# Patient Record
Sex: MALE | Race: WHITE | NOT HISPANIC OR LATINO | Employment: OTHER | URBAN - METROPOLITAN AREA
[De-identification: names, ages, dates, MRNs, and addresses within clinical notes are randomized per-mention and may not be internally consistent; named-entity substitution may affect disease eponyms.]

---

## 2017-01-04 ENCOUNTER — COMMUNICATION - HEALTHEAST (OUTPATIENT)
Dept: ONCOLOGY | Facility: HOSPITAL | Age: 52
End: 2017-01-04

## 2017-01-05 ENCOUNTER — COMMUNICATION - HEALTHEAST (OUTPATIENT)
Dept: ONCOLOGY | Facility: HOSPITAL | Age: 52
End: 2017-01-05

## 2017-02-06 ENCOUNTER — COMMUNICATION - HEALTHEAST (OUTPATIENT)
Dept: ONCOLOGY | Facility: HOSPITAL | Age: 52
End: 2017-02-06

## 2017-02-09 ENCOUNTER — AMBULATORY - HEALTHEAST (OUTPATIENT)
Dept: ONCOLOGY | Facility: HOSPITAL | Age: 52
End: 2017-02-09

## 2017-02-09 ENCOUNTER — COMMUNICATION - HEALTHEAST (OUTPATIENT)
Dept: ONCOLOGY | Facility: HOSPITAL | Age: 52
End: 2017-02-09

## 2017-02-09 ENCOUNTER — COMMUNICATION - HEALTHEAST (OUTPATIENT)
Dept: ADMINISTRATIVE | Facility: HOSPITAL | Age: 52
End: 2017-02-09

## 2017-02-13 ENCOUNTER — HOSPITAL ENCOUNTER (OUTPATIENT)
Dept: PET IMAGING | Facility: HOSPITAL | Age: 52
Setting detail: RADIATION/ONCOLOGY SERIES
Discharge: STILL ADMITTED OR ADMINISTRATIVE | End: 2017-02-13
Attending: INTERNAL MEDICINE

## 2017-02-13 ENCOUNTER — HOSPITAL ENCOUNTER (OUTPATIENT)
Dept: PET IMAGING | Facility: HOSPITAL | Age: 52
Discharge: HOME | End: 2017-02-13
Attending: INTERNAL MEDICINE

## 2017-02-13 DIAGNOSIS — C82.90 FOLLICULAR LYMPHOMA (H): ICD-10-CM

## 2017-02-13 ASSESSMENT — MIFFLIN-ST. JEOR: SCORE: 1824.63

## 2017-02-15 ENCOUNTER — OFFICE VISIT - HEALTHEAST (OUTPATIENT)
Dept: ONCOLOGY | Facility: HOSPITAL | Age: 52
End: 2017-02-15

## 2017-02-15 ENCOUNTER — AMBULATORY - HEALTHEAST (OUTPATIENT)
Dept: INFUSION THERAPY | Facility: HOSPITAL | Age: 52
End: 2017-02-15

## 2017-02-15 DIAGNOSIS — C82.90 FOLLICULAR LYMPHOMA (H): ICD-10-CM

## 2017-02-15 DIAGNOSIS — C82.08 FOLLICULAR LYMPHOMA GRADE I, LYMPH NODES OF MULTIPLE SITES (H): ICD-10-CM

## 2017-02-15 DIAGNOSIS — R00.2 HEART PALPITATIONS: ICD-10-CM

## 2017-02-16 ENCOUNTER — HOSPITAL ENCOUNTER (OUTPATIENT)
Dept: ULTRASOUND IMAGING | Facility: CLINIC | Age: 52
Setting detail: RADIATION/ONCOLOGY SERIES
Discharge: STILL ADMITTED OR ADMINISTRATIVE | End: 2017-02-16
Attending: INTERNAL MEDICINE

## 2017-02-16 DIAGNOSIS — C82.90 FOLLICULAR LYMPHOMA (H): ICD-10-CM

## 2017-02-17 ENCOUNTER — COMMUNICATION - HEALTHEAST (OUTPATIENT)
Dept: ONCOLOGY | Facility: HOSPITAL | Age: 52
End: 2017-02-17

## 2017-02-17 ENCOUNTER — HOSPITAL ENCOUNTER (OUTPATIENT)
Dept: INTERVENTIONAL RADIOLOGY/VASCULAR | Facility: HOSPITAL | Age: 52
Discharge: HOME | End: 2017-02-17
Attending: INTERNAL MEDICINE

## 2017-02-17 DIAGNOSIS — C82.08 FOLLICULAR LYMPHOMA GRADE I, LYMPH NODES OF MULTIPLE SITES (H): ICD-10-CM

## 2017-02-17 DIAGNOSIS — C82.90 FOLLICULAR LYMPHOMA (H): ICD-10-CM

## 2017-02-17 LAB
LAB AP CHARGES (HE HISTORICAL CONVERSION): ABNORMAL
PATH REPORT.COMMENTS IMP SPEC: ABNORMAL
PATH REPORT.COMMENTS IMP SPEC: ABNORMAL
PATH REPORT.FINAL DX SPEC: ABNORMAL
PATH REPORT.MICROSCOPIC SPEC OTHER STN: ABNORMAL
RESULT FLAG (HE HISTORICAL CONVERSION): ABNORMAL

## 2017-02-17 ASSESSMENT — MIFFLIN-ST. JEOR: SCORE: 1910.81

## 2017-02-18 LAB
LAB AP CHARGES (HE HISTORICAL CONVERSION): ABNORMAL
LAB AP INITIAL CYTO EVAL (HE HISTORICAL CONVERSION): ABNORMAL
LAB MED GENERAL PATH INTERP (HE HISTORICAL CONVERSION): ABNORMAL
PATH REPORT.COMMENTS IMP SPEC: ABNORMAL
PATH REPORT.COMMENTS IMP SPEC: ABNORMAL
PATH REPORT.FINAL DX SPEC: ABNORMAL
PATH REPORT.MICROSCOPIC SPEC OTHER STN: ABNORMAL
PATH REPORT.RELEVANT HX SPEC: ABNORMAL
SPECIMEN DESCRIPTION: ABNORMAL

## 2017-02-20 ENCOUNTER — COMMUNICATION - HEALTHEAST (OUTPATIENT)
Dept: INTERVENTIONAL RADIOLOGY/VASCULAR | Facility: HOSPITAL | Age: 52
End: 2017-02-20

## 2017-02-20 ENCOUNTER — INFUSION - HEALTHEAST (OUTPATIENT)
Dept: INFUSION THERAPY | Facility: HOSPITAL | Age: 52
End: 2017-02-20

## 2017-02-20 DIAGNOSIS — C82.90 FOLLICULAR LYMPHOMA (H): ICD-10-CM

## 2017-02-20 DIAGNOSIS — C82.08 FOLLICULAR LYMPHOMA GRADE I, LYMPH NODES OF MULTIPLE SITES (H): ICD-10-CM

## 2017-02-20 LAB
HBV CORE AB SERPL QL IA: NEGATIVE
HBV SURFACE AG SERPL QL IA: NEGATIVE

## 2017-02-21 ENCOUNTER — INFUSION - HEALTHEAST (OUTPATIENT)
Dept: INFUSION THERAPY | Facility: HOSPITAL | Age: 52
End: 2017-02-21

## 2017-02-21 DIAGNOSIS — C82.90 FOLLICULAR LYMPHOMA (H): ICD-10-CM

## 2017-02-21 DIAGNOSIS — C82.08 FOLLICULAR LYMPHOMA GRADE I, LYMPH NODES OF MULTIPLE SITES (H): ICD-10-CM

## 2017-03-06 ENCOUNTER — AMBULATORY - HEALTHEAST (OUTPATIENT)
Dept: INFUSION THERAPY | Facility: HOSPITAL | Age: 52
End: 2017-03-06

## 2017-03-06 ENCOUNTER — OFFICE VISIT - HEALTHEAST (OUTPATIENT)
Dept: ONCOLOGY | Facility: HOSPITAL | Age: 52
End: 2017-03-06

## 2017-03-06 DIAGNOSIS — C82.08 FOLLICULAR LYMPHOMA GRADE I, LYMPH NODES OF MULTIPLE SITES (H): ICD-10-CM

## 2017-03-06 DIAGNOSIS — R10.13 DYSPEPSIA: ICD-10-CM

## 2017-03-06 DIAGNOSIS — D63.8 ANEMIA, CHRONIC DISEASE: ICD-10-CM

## 2017-03-06 DIAGNOSIS — R11.0 CHEMOTHERAPY-INDUCED NAUSEA: ICD-10-CM

## 2017-03-06 DIAGNOSIS — T45.1X5A CHEMOTHERAPY-INDUCED NAUSEA: ICD-10-CM

## 2017-03-06 ASSESSMENT — MIFFLIN-ST. JEOR: SCORE: 1911.72

## 2017-03-20 ENCOUNTER — OFFICE VISIT - HEALTHEAST (OUTPATIENT)
Dept: ONCOLOGY | Facility: HOSPITAL | Age: 52
End: 2017-03-20

## 2017-03-20 ENCOUNTER — AMBULATORY - HEALTHEAST (OUTPATIENT)
Dept: INFUSION THERAPY | Facility: HOSPITAL | Age: 52
End: 2017-03-20

## 2017-03-20 ENCOUNTER — INFUSION - HEALTHEAST (OUTPATIENT)
Dept: INFUSION THERAPY | Facility: HOSPITAL | Age: 52
End: 2017-03-20

## 2017-03-20 DIAGNOSIS — C82.08 FOLLICULAR LYMPHOMA GRADE I, LYMPH NODES OF MULTIPLE SITES (H): ICD-10-CM

## 2017-03-20 DIAGNOSIS — D64.81 ANEMIA ASSOCIATED WITH CHEMOTHERAPY: ICD-10-CM

## 2017-03-20 DIAGNOSIS — R53.83 FATIGUE: ICD-10-CM

## 2017-03-20 DIAGNOSIS — T45.1X5A ANEMIA ASSOCIATED WITH CHEMOTHERAPY: ICD-10-CM

## 2017-03-21 ENCOUNTER — INFUSION - HEALTHEAST (OUTPATIENT)
Dept: INFUSION THERAPY | Facility: HOSPITAL | Age: 52
End: 2017-03-21

## 2017-03-21 DIAGNOSIS — C82.08 FOLLICULAR LYMPHOMA GRADE I, LYMPH NODES OF MULTIPLE SITES (H): ICD-10-CM

## 2017-04-18 ENCOUNTER — AMBULATORY - HEALTHEAST (OUTPATIENT)
Dept: INFUSION THERAPY | Facility: HOSPITAL | Age: 52
End: 2017-04-18

## 2017-04-18 ENCOUNTER — OFFICE VISIT - HEALTHEAST (OUTPATIENT)
Dept: ONCOLOGY | Facility: HOSPITAL | Age: 52
End: 2017-04-18

## 2017-04-18 ENCOUNTER — INFUSION - HEALTHEAST (OUTPATIENT)
Dept: INFUSION THERAPY | Facility: HOSPITAL | Age: 52
End: 2017-04-18

## 2017-04-18 DIAGNOSIS — F41.9 ANXIETY: ICD-10-CM

## 2017-04-18 DIAGNOSIS — R79.89 ELEVATED TSH: ICD-10-CM

## 2017-04-18 DIAGNOSIS — C82.08 FOLLICULAR LYMPHOMA GRADE I, LYMPH NODES OF MULTIPLE SITES (H): ICD-10-CM

## 2017-04-18 DIAGNOSIS — K12.31 MUCOSITIS DUE TO CHEMOTHERAPY: ICD-10-CM

## 2017-04-19 ENCOUNTER — INFUSION - HEALTHEAST (OUTPATIENT)
Dept: INFUSION THERAPY | Facility: HOSPITAL | Age: 52
End: 2017-04-19

## 2017-04-19 DIAGNOSIS — C82.08 FOLLICULAR LYMPHOMA GRADE I, LYMPH NODES OF MULTIPLE SITES (H): ICD-10-CM

## 2017-04-21 ENCOUNTER — COMMUNICATION - HEALTHEAST (OUTPATIENT)
Dept: ONCOLOGY | Facility: HOSPITAL | Age: 52
End: 2017-04-21

## 2017-05-11 ENCOUNTER — HOSPITAL ENCOUNTER (OUTPATIENT)
Dept: CT IMAGING | Facility: HOSPITAL | Age: 52
Setting detail: RADIATION/ONCOLOGY SERIES
Discharge: STILL ADMITTED OR ADMINISTRATIVE | End: 2017-05-11
Attending: INTERNAL MEDICINE

## 2017-05-11 DIAGNOSIS — C82.08 FOLLICULAR LYMPHOMA GRADE I, LYMPH NODES OF MULTIPLE SITES (H): ICD-10-CM

## 2017-05-15 ENCOUNTER — INFUSION - HEALTHEAST (OUTPATIENT)
Dept: INFUSION THERAPY | Facility: HOSPITAL | Age: 52
End: 2017-05-15

## 2017-05-15 ENCOUNTER — OFFICE VISIT - HEALTHEAST (OUTPATIENT)
Dept: ONCOLOGY | Facility: HOSPITAL | Age: 52
End: 2017-05-15

## 2017-05-15 ENCOUNTER — AMBULATORY - HEALTHEAST (OUTPATIENT)
Dept: INFUSION THERAPY | Facility: HOSPITAL | Age: 52
End: 2017-05-15

## 2017-05-15 DIAGNOSIS — D64.81 ANEMIA ASSOCIATED WITH CHEMOTHERAPY: ICD-10-CM

## 2017-05-15 DIAGNOSIS — C82.08 FOLLICULAR LYMPHOMA GRADE I, LYMPH NODES OF MULTIPLE SITES (H): ICD-10-CM

## 2017-05-15 DIAGNOSIS — T45.1X5A ANEMIA ASSOCIATED WITH CHEMOTHERAPY: ICD-10-CM

## 2017-05-15 DIAGNOSIS — R53.83 FATIGUE: ICD-10-CM

## 2017-05-15 DIAGNOSIS — M79.89 BILATERAL SWELLING OF FEET: ICD-10-CM

## 2017-05-16 ENCOUNTER — INFUSION - HEALTHEAST (OUTPATIENT)
Dept: INFUSION THERAPY | Facility: HOSPITAL | Age: 52
End: 2017-05-16

## 2017-05-16 DIAGNOSIS — C82.08 FOLLICULAR LYMPHOMA GRADE I, LYMPH NODES OF MULTIPLE SITES (H): ICD-10-CM

## 2017-06-12 ENCOUNTER — INFUSION - HEALTHEAST (OUTPATIENT)
Dept: INFUSION THERAPY | Facility: HOSPITAL | Age: 52
End: 2017-06-12

## 2017-06-12 ENCOUNTER — AMBULATORY - HEALTHEAST (OUTPATIENT)
Dept: INFUSION THERAPY | Facility: HOSPITAL | Age: 52
End: 2017-06-12

## 2017-06-12 ENCOUNTER — OFFICE VISIT - HEALTHEAST (OUTPATIENT)
Dept: ONCOLOGY | Facility: HOSPITAL | Age: 52
End: 2017-06-12

## 2017-06-12 DIAGNOSIS — T45.1X5A ANEMIA ASSOCIATED WITH CHEMOTHERAPY: ICD-10-CM

## 2017-06-12 DIAGNOSIS — C82.08 FOLLICULAR LYMPHOMA GRADE I, LYMPH NODES OF MULTIPLE SITES (H): ICD-10-CM

## 2017-06-12 DIAGNOSIS — J06.9 URI WITH COUGH AND CONGESTION: ICD-10-CM

## 2017-06-12 DIAGNOSIS — D64.81 ANEMIA ASSOCIATED WITH CHEMOTHERAPY: ICD-10-CM

## 2017-06-13 ENCOUNTER — INFUSION - HEALTHEAST (OUTPATIENT)
Dept: INFUSION THERAPY | Facility: HOSPITAL | Age: 52
End: 2017-06-13

## 2017-06-13 DIAGNOSIS — C82.08 FOLLICULAR LYMPHOMA GRADE I, LYMPH NODES OF MULTIPLE SITES (H): ICD-10-CM

## 2017-07-07 ENCOUNTER — AMBULATORY - HEALTHEAST (OUTPATIENT)
Dept: ONCOLOGY | Facility: HOSPITAL | Age: 52
End: 2017-07-07

## 2017-07-10 ENCOUNTER — INFUSION - HEALTHEAST (OUTPATIENT)
Dept: INFUSION THERAPY | Facility: HOSPITAL | Age: 52
End: 2017-07-10

## 2017-07-10 ENCOUNTER — OFFICE VISIT - HEALTHEAST (OUTPATIENT)
Dept: ONCOLOGY | Facility: HOSPITAL | Age: 52
End: 2017-07-10

## 2017-07-10 ENCOUNTER — AMBULATORY - HEALTHEAST (OUTPATIENT)
Dept: INFUSION THERAPY | Facility: HOSPITAL | Age: 52
End: 2017-07-10

## 2017-07-10 DIAGNOSIS — C82.08 FOLLICULAR LYMPHOMA GRADE I, LYMPH NODES OF MULTIPLE SITES (H): ICD-10-CM

## 2017-07-10 ASSESSMENT — MIFFLIN-ST. JEOR: SCORE: 1915.35

## 2017-07-11 ENCOUNTER — INFUSION - HEALTHEAST (OUTPATIENT)
Dept: INFUSION THERAPY | Facility: HOSPITAL | Age: 52
End: 2017-07-11

## 2017-07-11 DIAGNOSIS — C82.08 FOLLICULAR LYMPHOMA GRADE I, LYMPH NODES OF MULTIPLE SITES (H): ICD-10-CM

## 2017-08-10 ENCOUNTER — HOSPITAL ENCOUNTER (OUTPATIENT)
Dept: PET IMAGING | Facility: HOSPITAL | Age: 52
Discharge: HOME | End: 2017-08-10

## 2017-08-10 DIAGNOSIS — C82.08 FOLLICULAR LYMPHOMA GRADE I, LYMPH NODES OF MULTIPLE SITES (H): ICD-10-CM

## 2017-08-10 ASSESSMENT — MIFFLIN-ST. JEOR: SCORE: 1917.62

## 2017-08-14 ENCOUNTER — OFFICE VISIT - HEALTHEAST (OUTPATIENT)
Dept: ONCOLOGY | Facility: CLINIC | Age: 52
End: 2017-08-14

## 2017-08-14 ENCOUNTER — AMBULATORY - HEALTHEAST (OUTPATIENT)
Dept: INFUSION THERAPY | Facility: CLINIC | Age: 52
End: 2017-08-14

## 2017-08-14 DIAGNOSIS — N40.2 PROSTATE NODULE: ICD-10-CM

## 2017-08-14 DIAGNOSIS — C82.08 FOLLICULAR LYMPHOMA GRADE I, LYMPH NODES OF MULTIPLE SITES (H): ICD-10-CM

## 2017-08-14 ASSESSMENT — MIFFLIN-ST. JEOR: SCORE: 1943.02

## 2017-08-15 ENCOUNTER — COMMUNICATION - HEALTHEAST (OUTPATIENT)
Dept: ONCOLOGY | Facility: HOSPITAL | Age: 52
End: 2017-08-15

## 2017-08-23 ENCOUNTER — COMMUNICATION - HEALTHEAST (OUTPATIENT)
Dept: ADMINISTRATIVE | Facility: HOSPITAL | Age: 52
End: 2017-08-23

## 2017-08-29 ENCOUNTER — RECORDS - HEALTHEAST (OUTPATIENT)
Dept: ADMINISTRATIVE | Facility: OTHER | Age: 52
End: 2017-08-29

## 2017-09-11 ENCOUNTER — INFUSION - HEALTHEAST (OUTPATIENT)
Dept: INFUSION THERAPY | Facility: HOSPITAL | Age: 52
End: 2017-09-11

## 2017-09-11 DIAGNOSIS — C82.08 FOLLICULAR LYMPHOMA GRADE I, LYMPH NODES OF MULTIPLE SITES (H): ICD-10-CM

## 2017-09-26 ENCOUNTER — RECORDS - HEALTHEAST (OUTPATIENT)
Dept: ADMINISTRATIVE | Facility: OTHER | Age: 52
End: 2017-09-26

## 2017-09-27 ENCOUNTER — RECORDS - HEALTHEAST (OUTPATIENT)
Dept: ADMINISTRATIVE | Facility: OTHER | Age: 52
End: 2017-09-27

## 2017-10-05 ENCOUNTER — RECORDS - HEALTHEAST (OUTPATIENT)
Dept: ADMINISTRATIVE | Facility: OTHER | Age: 52
End: 2017-10-05

## 2017-10-19 ENCOUNTER — RECORDS - HEALTHEAST (OUTPATIENT)
Dept: ADMINISTRATIVE | Facility: OTHER | Age: 52
End: 2017-10-19

## 2017-10-25 ENCOUNTER — RECORDS - HEALTHEAST (OUTPATIENT)
Dept: ADMINISTRATIVE | Facility: OTHER | Age: 52
End: 2017-10-25

## 2017-10-27 ENCOUNTER — COMMUNICATION - HEALTHEAST (OUTPATIENT)
Dept: ONCOLOGY | Facility: HOSPITAL | Age: 52
End: 2017-10-27

## 2017-11-03 ENCOUNTER — RECORDS - HEALTHEAST (OUTPATIENT)
Dept: ADMINISTRATIVE | Facility: OTHER | Age: 52
End: 2017-11-03

## 2017-11-06 ENCOUNTER — COMMUNICATION - HEALTHEAST (OUTPATIENT)
Dept: ONCOLOGY | Facility: HOSPITAL | Age: 52
End: 2017-11-06

## 2017-11-12 ENCOUNTER — AMBULATORY - HEALTHEAST (OUTPATIENT)
Dept: ONCOLOGY | Facility: HOSPITAL | Age: 52
End: 2017-11-12

## 2017-11-14 ENCOUNTER — RECORDS - HEALTHEAST (OUTPATIENT)
Dept: ADMINISTRATIVE | Facility: OTHER | Age: 52
End: 2017-11-14

## 2017-11-14 ENCOUNTER — ANESTHESIA - HEALTHEAST (OUTPATIENT)
Dept: SURGERY | Facility: HOSPITAL | Age: 52
End: 2017-11-14

## 2017-11-14 ASSESSMENT — MIFFLIN-ST. JEOR: SCORE: 1915.35

## 2017-11-15 ENCOUNTER — SURGERY - HEALTHEAST (OUTPATIENT)
Dept: SURGERY | Facility: HOSPITAL | Age: 52
End: 2017-11-15

## 2017-11-15 ASSESSMENT — MIFFLIN-ST. JEOR: SCORE: 1907.18

## 2017-11-29 ENCOUNTER — INFUSION - HEALTHEAST (OUTPATIENT)
Dept: INFUSION THERAPY | Facility: HOSPITAL | Age: 52
End: 2017-11-29

## 2017-11-29 DIAGNOSIS — C82.08 FOLLICULAR LYMPHOMA GRADE I, LYMPH NODES OF MULTIPLE SITES (H): ICD-10-CM

## 2017-12-01 ENCOUNTER — RECORDS - HEALTHEAST (OUTPATIENT)
Dept: ADMINISTRATIVE | Facility: OTHER | Age: 52
End: 2017-12-01

## 2018-01-22 ENCOUNTER — AMBULATORY - HEALTHEAST (OUTPATIENT)
Dept: INFUSION THERAPY | Facility: HOSPITAL | Age: 53
End: 2018-01-22

## 2018-01-22 ENCOUNTER — HOSPITAL ENCOUNTER (OUTPATIENT)
Dept: CT IMAGING | Facility: HOSPITAL | Age: 53
Setting detail: RADIATION/ONCOLOGY SERIES
Discharge: STILL ADMITTED OR ADMINISTRATIVE | End: 2018-01-22
Attending: INTERNAL MEDICINE

## 2018-01-22 DIAGNOSIS — C82.08 FOLLICULAR LYMPHOMA GRADE I, LYMPH NODES OF MULTIPLE SITES (H): ICD-10-CM

## 2018-01-22 LAB
ALBUMIN SERPL-MCNC: 3.9 G/DL (ref 3.5–5)
ALP SERPL-CCNC: 90 U/L (ref 45–120)
ALT SERPL W P-5'-P-CCNC: 43 U/L (ref 0–45)
ANION GAP SERPL CALCULATED.3IONS-SCNC: 9 MMOL/L (ref 5–18)
AST SERPL W P-5'-P-CCNC: 19 U/L (ref 0–40)
BASOPHILS # BLD AUTO: 0 THOU/UL (ref 0–0.2)
BASOPHILS NFR BLD AUTO: 0 % (ref 0–2)
BILIRUB SERPL-MCNC: 0.4 MG/DL (ref 0–1)
BUN SERPL-MCNC: 13 MG/DL (ref 8–22)
CALCIUM SERPL-MCNC: 9.2 MG/DL (ref 8.5–10.5)
CHLORIDE BLD-SCNC: 106 MMOL/L (ref 98–107)
CO2 SERPL-SCNC: 25 MMOL/L (ref 22–31)
CREAT SERPL-MCNC: 1.05 MG/DL (ref 0.7–1.3)
EOSINOPHIL # BLD AUTO: 0.2 THOU/UL (ref 0–0.4)
EOSINOPHIL NFR BLD AUTO: 3 % (ref 0–6)
ERYTHROCYTE [DISTWIDTH] IN BLOOD BY AUTOMATED COUNT: 15.4 % (ref 11–14.5)
GFR SERPL CREATININE-BSD FRML MDRD: >60 ML/MIN/1.73M2
GLUCOSE BLD-MCNC: 108 MG/DL (ref 70–125)
HCT VFR BLD AUTO: 35.1 % (ref 40–54)
HGB BLD-MCNC: 11.4 G/DL (ref 14–18)
LDH SERPL L TO P-CCNC: 212 U/L (ref 125–220)
LYMPHOCYTES # BLD AUTO: 0.6 THOU/UL (ref 0.8–4.4)
LYMPHOCYTES NFR BLD AUTO: 10 % (ref 20–40)
MCH RBC QN AUTO: 27.1 PG (ref 27–34)
MCHC RBC AUTO-ENTMCNC: 32.5 G/DL (ref 32–36)
MCV RBC AUTO: 83 FL (ref 80–100)
MONOCYTES # BLD AUTO: 0.6 THOU/UL (ref 0–0.9)
MONOCYTES NFR BLD AUTO: 10 % (ref 2–10)
NEUTROPHILS # BLD AUTO: 4.6 THOU/UL (ref 2–7.7)
NEUTROPHILS NFR BLD AUTO: 77 % (ref 50–70)
PLATELET # BLD AUTO: 239 THOU/UL (ref 140–440)
PMV BLD AUTO: 9.8 FL (ref 8.5–12.5)
POTASSIUM BLD-SCNC: 4.2 MMOL/L (ref 3.5–5)
PROT SERPL-MCNC: 6.8 G/DL (ref 6–8)
RBC # BLD AUTO: 4.21 MILL/UL (ref 4.4–6.2)
SODIUM SERPL-SCNC: 140 MMOL/L (ref 136–145)
WBC: 6 THOU/UL (ref 4–11)

## 2018-01-24 ENCOUNTER — INFUSION - HEALTHEAST (OUTPATIENT)
Dept: INFUSION THERAPY | Facility: HOSPITAL | Age: 53
End: 2018-01-24

## 2018-01-24 ENCOUNTER — AMBULATORY - HEALTHEAST (OUTPATIENT)
Dept: INFUSION THERAPY | Facility: HOSPITAL | Age: 53
End: 2018-01-24

## 2018-01-24 ENCOUNTER — OFFICE VISIT - HEALTHEAST (OUTPATIENT)
Dept: ONCOLOGY | Facility: HOSPITAL | Age: 53
End: 2018-01-24

## 2018-01-24 DIAGNOSIS — C82.08 FOLLICULAR LYMPHOMA GRADE I, LYMPH NODES OF MULTIPLE SITES (H): ICD-10-CM

## 2018-01-24 DIAGNOSIS — C61 PROSTATE CANCER (H): ICD-10-CM

## 2018-01-24 DIAGNOSIS — C82.03 FOLLICULAR LYMPHOMA GRADE I OF INTRA-ABDOMINAL LYMPH NODES (H): ICD-10-CM

## 2018-01-24 LAB
BASOPHILS # BLD AUTO: 0 THOU/UL (ref 0–0.2)
BASOPHILS NFR BLD AUTO: 0 % (ref 0–2)
EOSINOPHIL # BLD AUTO: 0.2 THOU/UL (ref 0–0.4)
EOSINOPHIL NFR BLD AUTO: 2 % (ref 0–6)
ERYTHROCYTE [DISTWIDTH] IN BLOOD BY AUTOMATED COUNT: 15.5 % (ref 11–14.5)
HCT VFR BLD AUTO: 34.2 % (ref 40–54)
HGB BLD-MCNC: 11.3 G/DL (ref 14–18)
LYMPHOCYTES # BLD AUTO: 0.5 THOU/UL (ref 0.8–4.4)
LYMPHOCYTES NFR BLD AUTO: 7 % (ref 20–40)
MCH RBC QN AUTO: 27.3 PG (ref 27–34)
MCHC RBC AUTO-ENTMCNC: 33 G/DL (ref 32–36)
MCV RBC AUTO: 83 FL (ref 80–100)
MONOCYTES # BLD AUTO: 0.6 THOU/UL (ref 0–0.9)
MONOCYTES NFR BLD AUTO: 8 % (ref 2–10)
NEUTROPHILS # BLD AUTO: 5.7 THOU/UL (ref 2–7.7)
NEUTROPHILS NFR BLD AUTO: 83 % (ref 50–70)
PLATELET # BLD AUTO: 212 THOU/UL (ref 140–440)
PMV BLD AUTO: 9.2 FL (ref 8.5–12.5)
PSA SERPL-MCNC: <0.1 NG/ML (ref 0–3.5)
RBC # BLD AUTO: 4.14 MILL/UL (ref 4.4–6.2)
WBC: 6.9 THOU/UL (ref 4–11)

## 2018-02-22 ENCOUNTER — RECORDS - HEALTHEAST (OUTPATIENT)
Dept: ADMINISTRATIVE | Facility: OTHER | Age: 53
End: 2018-02-22

## 2018-03-01 ENCOUNTER — RECORDS - HEALTHEAST (OUTPATIENT)
Dept: ADMINISTRATIVE | Facility: OTHER | Age: 53
End: 2018-03-01

## 2018-03-02 ENCOUNTER — RECORDS - HEALTHEAST (OUTPATIENT)
Dept: ADMINISTRATIVE | Facility: OTHER | Age: 53
End: 2018-03-02

## 2018-03-06 ENCOUNTER — RECORDS - HEALTHEAST (OUTPATIENT)
Dept: ADMINISTRATIVE | Facility: OTHER | Age: 53
End: 2018-03-06

## 2018-03-09 ENCOUNTER — AMBULATORY - HEALTHEAST (OUTPATIENT)
Dept: ONCOLOGY | Facility: HOSPITAL | Age: 53
End: 2018-03-09

## 2018-03-09 DIAGNOSIS — C82.08 FOLLICULAR LYMPHOMA GRADE I, LYMPH NODES OF MULTIPLE SITES (H): ICD-10-CM

## 2018-03-09 DIAGNOSIS — C61 PROSTATE CANCER (H): ICD-10-CM

## 2018-03-09 DIAGNOSIS — Z80.0 FAMILY HISTORY OF COLON CANCER IN MOTHER: ICD-10-CM

## 2018-03-12 ENCOUNTER — COMMUNICATION - HEALTHEAST (OUTPATIENT)
Dept: ONCOLOGY | Facility: HOSPITAL | Age: 53
End: 2018-03-12

## 2018-03-23 ENCOUNTER — INFUSION - HEALTHEAST (OUTPATIENT)
Dept: INFUSION THERAPY | Facility: HOSPITAL | Age: 53
End: 2018-03-23

## 2018-03-23 DIAGNOSIS — C82.08 FOLLICULAR LYMPHOMA GRADE I, LYMPH NODES OF MULTIPLE SITES (H): ICD-10-CM

## 2018-03-23 LAB
ALBUMIN SERPL-MCNC: 3.8 G/DL (ref 3.5–5)
ALP SERPL-CCNC: 69 U/L (ref 45–120)
ALT SERPL W P-5'-P-CCNC: 58 U/L (ref 0–45)
ANION GAP SERPL CALCULATED.3IONS-SCNC: 10 MMOL/L (ref 5–18)
AST SERPL W P-5'-P-CCNC: 23 U/L (ref 0–40)
BASOPHILS # BLD AUTO: 0 THOU/UL (ref 0–0.2)
BASOPHILS NFR BLD AUTO: 0 % (ref 0–2)
BILIRUB SERPL-MCNC: 0.6 MG/DL (ref 0–1)
BUN SERPL-MCNC: 10 MG/DL (ref 8–22)
CALCIUM SERPL-MCNC: 9.2 MG/DL (ref 8.5–10.5)
CHLORIDE BLD-SCNC: 103 MMOL/L (ref 98–107)
CO2 SERPL-SCNC: 25 MMOL/L (ref 22–31)
CREAT SERPL-MCNC: 1.16 MG/DL (ref 0.7–1.3)
EOSINOPHIL # BLD AUTO: 0.1 THOU/UL (ref 0–0.4)
EOSINOPHIL NFR BLD AUTO: 1 % (ref 0–6)
ERYTHROCYTE [DISTWIDTH] IN BLOOD BY AUTOMATED COUNT: 15 % (ref 11–14.5)
GFR SERPL CREATININE-BSD FRML MDRD: >60 ML/MIN/1.73M2
GLUCOSE BLD-MCNC: 101 MG/DL (ref 70–125)
HCT VFR BLD AUTO: 33.7 % (ref 40–54)
HGB BLD-MCNC: 10.9 G/DL (ref 14–18)
LYMPHOCYTES # BLD AUTO: 0.2 THOU/UL (ref 0.8–4.4)
LYMPHOCYTES NFR BLD AUTO: 5 % (ref 20–40)
MCH RBC QN AUTO: 26.7 PG (ref 27–34)
MCHC RBC AUTO-ENTMCNC: 32.3 G/DL (ref 32–36)
MCV RBC AUTO: 83 FL (ref 80–100)
MONOCYTES # BLD AUTO: 0.6 THOU/UL (ref 0–0.9)
MONOCYTES NFR BLD AUTO: 13 % (ref 2–10)
NEUTROPHILS # BLD AUTO: 4.1 THOU/UL (ref 2–7.7)
NEUTROPHILS NFR BLD AUTO: 81 % (ref 50–70)
PLATELET # BLD AUTO: 170 THOU/UL (ref 140–440)
PMV BLD AUTO: 9.8 FL (ref 8.5–12.5)
POTASSIUM BLD-SCNC: 4.1 MMOL/L (ref 3.5–5)
PROT SERPL-MCNC: 6.5 G/DL (ref 6–8)
RBC # BLD AUTO: 4.08 MILL/UL (ref 4.4–6.2)
SODIUM SERPL-SCNC: 138 MMOL/L (ref 136–145)
WBC: 5.1 THOU/UL (ref 4–11)

## 2018-03-26 ENCOUNTER — RECORDS - HEALTHEAST (OUTPATIENT)
Dept: ADMINISTRATIVE | Facility: OTHER | Age: 53
End: 2018-03-26

## 2018-04-16 ENCOUNTER — COMMUNICATION - HEALTHEAST (OUTPATIENT)
Dept: ONCOLOGY | Facility: HOSPITAL | Age: 53
End: 2018-04-16

## 2018-04-17 ENCOUNTER — COMMUNICATION - HEALTHEAST (OUTPATIENT)
Dept: ONCOLOGY | Facility: HOSPITAL | Age: 53
End: 2018-04-17

## 2018-04-17 ENCOUNTER — OFFICE VISIT - HEALTHEAST (OUTPATIENT)
Dept: ONCOLOGY | Facility: HOSPITAL | Age: 53
End: 2018-04-17

## 2018-04-17 DIAGNOSIS — Z71.83 ENCOUNTER FOR NONPROCREATIVE GENETIC COUNSELING: ICD-10-CM

## 2018-04-17 DIAGNOSIS — C61 PROSTATE CANCER (H): ICD-10-CM

## 2018-04-17 DIAGNOSIS — Z80.0 FAMILY HISTORY OF COLON CANCER: ICD-10-CM

## 2018-04-17 DIAGNOSIS — C82.90 FOLLICULAR NON-HODGKIN'S LYMPHOMA (H): ICD-10-CM

## 2018-04-23 ENCOUNTER — HOSPITAL ENCOUNTER (OUTPATIENT)
Dept: CT IMAGING | Facility: HOSPITAL | Age: 53
Discharge: HOME | End: 2018-04-23
Attending: INTERNAL MEDICINE

## 2018-04-23 DIAGNOSIS — C82.03 FOLLICULAR LYMPHOMA GRADE I OF INTRA-ABDOMINAL LYMPH NODES (H): ICD-10-CM

## 2018-04-23 LAB
CREAT BLD-MCNC: 1 MG/DL
POC GFR AMER AF HE - HISTORICAL: >60 ML/MIN/1.73M2
POC GFR NON AMER AF HE - HISTORICAL: >60 ML/MIN/1.73M2

## 2018-04-25 ENCOUNTER — OFFICE VISIT - HEALTHEAST (OUTPATIENT)
Dept: ONCOLOGY | Facility: HOSPITAL | Age: 53
End: 2018-04-25

## 2018-04-25 DIAGNOSIS — C61 PROSTATE CANCER (H): ICD-10-CM

## 2018-04-25 DIAGNOSIS — C82.08 FOLLICULAR LYMPHOMA GRADE I, LYMPH NODES OF MULTIPLE SITES (H): ICD-10-CM

## 2018-05-14 ENCOUNTER — RECORDS - HEALTHEAST (OUTPATIENT)
Dept: ADMINISTRATIVE | Facility: OTHER | Age: 53
End: 2018-05-14

## 2018-05-15 ENCOUNTER — RECORDS - HEALTHEAST (OUTPATIENT)
Dept: ADMINISTRATIVE | Facility: OTHER | Age: 53
End: 2018-05-15

## 2018-05-16 ENCOUNTER — INFUSION - HEALTHEAST (OUTPATIENT)
Dept: INFUSION THERAPY | Facility: HOSPITAL | Age: 53
End: 2018-05-16

## 2018-05-16 DIAGNOSIS — C82.08 FOLLICULAR LYMPHOMA GRADE I, LYMPH NODES OF MULTIPLE SITES (H): ICD-10-CM

## 2018-06-25 ENCOUNTER — RECORDS - HEALTHEAST (OUTPATIENT)
Dept: ADMINISTRATIVE | Facility: OTHER | Age: 53
End: 2018-06-25

## 2018-07-11 ENCOUNTER — COMMUNICATION - HEALTHEAST (OUTPATIENT)
Dept: ONCOLOGY | Facility: HOSPITAL | Age: 53
End: 2018-07-11

## 2018-07-17 ENCOUNTER — AMBULATORY - HEALTHEAST (OUTPATIENT)
Dept: ONCOLOGY | Facility: HOSPITAL | Age: 53
End: 2018-07-17

## 2018-07-17 ENCOUNTER — INFUSION - HEALTHEAST (OUTPATIENT)
Dept: INFUSION THERAPY | Facility: HOSPITAL | Age: 53
End: 2018-07-17

## 2018-07-17 DIAGNOSIS — C82.08 FOLLICULAR LYMPHOMA GRADE I, LYMPH NODES OF MULTIPLE SITES (H): ICD-10-CM

## 2018-07-17 LAB
ALBUMIN SERPL-MCNC: 4.1 G/DL (ref 3.5–5)
ALP SERPL-CCNC: 65 U/L (ref 45–120)
ALT SERPL W P-5'-P-CCNC: 26 U/L (ref 0–45)
ANION GAP SERPL CALCULATED.3IONS-SCNC: 10 MMOL/L (ref 5–18)
AST SERPL W P-5'-P-CCNC: 21 U/L (ref 0–40)
BASOPHILS # BLD AUTO: 0 THOU/UL (ref 0–0.2)
BASOPHILS NFR BLD AUTO: 1 % (ref 0–2)
BILIRUB SERPL-MCNC: 0.5 MG/DL (ref 0–1)
BUN SERPL-MCNC: 15 MG/DL (ref 8–22)
CALCIUM SERPL-MCNC: 9.5 MG/DL (ref 8.5–10.5)
CHLORIDE BLD-SCNC: 107 MMOL/L (ref 98–107)
CO2 SERPL-SCNC: 24 MMOL/L (ref 22–31)
CREAT SERPL-MCNC: 1.01 MG/DL (ref 0.7–1.3)
EOSINOPHIL # BLD AUTO: 0.1 THOU/UL (ref 0–0.4)
EOSINOPHIL NFR BLD AUTO: 3 % (ref 0–6)
ERYTHROCYTE [DISTWIDTH] IN BLOOD BY AUTOMATED COUNT: 14.5 % (ref 11–14.5)
GFR SERPL CREATININE-BSD FRML MDRD: >60 ML/MIN/1.73M2
GLUCOSE BLD-MCNC: 106 MG/DL (ref 70–125)
HCT VFR BLD AUTO: 35.2 % (ref 40–54)
HGB BLD-MCNC: 11.5 G/DL (ref 14–18)
LDH SERPL L TO P-CCNC: 245 U/L (ref 125–220)
LYMPHOCYTES # BLD AUTO: 0.4 THOU/UL (ref 0.8–4.4)
LYMPHOCYTES NFR BLD AUTO: 9 % (ref 20–40)
MCH RBC QN AUTO: 29.2 PG (ref 27–34)
MCHC RBC AUTO-ENTMCNC: 32.7 G/DL (ref 32–36)
MCV RBC AUTO: 89 FL (ref 80–100)
MONOCYTES # BLD AUTO: 0.5 THOU/UL (ref 0–0.9)
MONOCYTES NFR BLD AUTO: 12 % (ref 2–10)
NEUTROPHILS # BLD AUTO: 3 THOU/UL (ref 2–7.7)
NEUTROPHILS NFR BLD AUTO: 75 % (ref 50–70)
PLATELET # BLD AUTO: 189 THOU/UL (ref 140–440)
PMV BLD AUTO: 9.5 FL (ref 8.5–12.5)
POTASSIUM BLD-SCNC: 3.7 MMOL/L (ref 3.5–5)
PROT SERPL-MCNC: 6.4 G/DL (ref 6–8)
RBC # BLD AUTO: 3.94 MILL/UL (ref 4.4–6.2)
SODIUM SERPL-SCNC: 141 MMOL/L (ref 136–145)
WBC: 4.1 THOU/UL (ref 4–11)

## 2018-08-08 ENCOUNTER — RECORDS - HEALTHEAST (OUTPATIENT)
Dept: ADMINISTRATIVE | Facility: OTHER | Age: 53
End: 2018-08-08

## 2018-08-13 ENCOUNTER — RECORDS - HEALTHEAST (OUTPATIENT)
Dept: ADMINISTRATIVE | Facility: OTHER | Age: 53
End: 2018-08-13

## 2018-08-31 ENCOUNTER — COMMUNICATION - HEALTHEAST (OUTPATIENT)
Dept: ONCOLOGY | Facility: HOSPITAL | Age: 53
End: 2018-08-31

## 2018-09-05 ENCOUNTER — COMMUNICATION - HEALTHEAST (OUTPATIENT)
Dept: ONCOLOGY | Facility: HOSPITAL | Age: 53
End: 2018-09-05

## 2018-09-11 ENCOUNTER — HOSPITAL ENCOUNTER (OUTPATIENT)
Dept: CT IMAGING | Facility: HOSPITAL | Age: 53
Discharge: HOME | End: 2018-09-11
Attending: INTERNAL MEDICINE

## 2018-09-11 ENCOUNTER — COMMUNICATION - HEALTHEAST (OUTPATIENT)
Dept: ONCOLOGY | Facility: HOSPITAL | Age: 53
End: 2018-09-11

## 2018-09-11 DIAGNOSIS — C82.08 FOLLICULAR LYMPHOMA GRADE I, LYMPH NODES OF MULTIPLE SITES (H): ICD-10-CM

## 2018-09-11 LAB
CREAT BLD-MCNC: 1.1 MG/DL
POC GFR AMER AF HE - HISTORICAL: >60 ML/MIN/1.73M2
POC GFR NON AMER AF HE - HISTORICAL: >60 ML/MIN/1.73M2

## 2018-09-13 ENCOUNTER — OFFICE VISIT - HEALTHEAST (OUTPATIENT)
Dept: ONCOLOGY | Facility: HOSPITAL | Age: 53
End: 2018-09-13

## 2018-09-13 ENCOUNTER — INFUSION - HEALTHEAST (OUTPATIENT)
Dept: INFUSION THERAPY | Facility: HOSPITAL | Age: 53
End: 2018-09-13

## 2018-09-13 ENCOUNTER — AMBULATORY - HEALTHEAST (OUTPATIENT)
Dept: INFUSION THERAPY | Facility: HOSPITAL | Age: 53
End: 2018-09-13

## 2018-09-13 DIAGNOSIS — C61 PROSTATE CANCER (H): ICD-10-CM

## 2018-09-13 DIAGNOSIS — C82.08 FOLLICULAR LYMPHOMA GRADE I, LYMPH NODES OF MULTIPLE SITES (H): ICD-10-CM

## 2018-09-13 LAB
ALBUMIN SERPL-MCNC: 4 G/DL (ref 3.5–5)
ALP SERPL-CCNC: 57 U/L (ref 45–120)
ALT SERPL W P-5'-P-CCNC: 29 U/L (ref 0–45)
ANION GAP SERPL CALCULATED.3IONS-SCNC: 7 MMOL/L (ref 5–18)
AST SERPL W P-5'-P-CCNC: 22 U/L (ref 0–40)
BASOPHILS # BLD AUTO: 0 THOU/UL (ref 0–0.2)
BASOPHILS NFR BLD AUTO: 0 % (ref 0–2)
BILIRUB SERPL-MCNC: 0.7 MG/DL (ref 0–1)
BUN SERPL-MCNC: 14 MG/DL (ref 8–22)
CALCIUM SERPL-MCNC: 9.1 MG/DL (ref 8.5–10.5)
CHLORIDE BLD-SCNC: 107 MMOL/L (ref 98–107)
CO2 SERPL-SCNC: 26 MMOL/L (ref 22–31)
CREAT SERPL-MCNC: 0.94 MG/DL (ref 0.7–1.3)
EOSINOPHIL # BLD AUTO: 0.3 THOU/UL (ref 0–0.4)
EOSINOPHIL NFR BLD AUTO: 6 % (ref 0–6)
ERYTHROCYTE [DISTWIDTH] IN BLOOD BY AUTOMATED COUNT: 15.1 % (ref 11–14.5)
GFR SERPL CREATININE-BSD FRML MDRD: >60 ML/MIN/1.73M2
GLUCOSE BLD-MCNC: 105 MG/DL (ref 70–125)
HCT VFR BLD AUTO: 36.4 % (ref 40–54)
HGB BLD-MCNC: 11.9 G/DL (ref 14–18)
LYMPHOCYTES # BLD AUTO: 0.5 THOU/UL (ref 0.8–4.4)
LYMPHOCYTES NFR BLD AUTO: 12 % (ref 20–40)
MCH RBC QN AUTO: 28.3 PG (ref 27–34)
MCHC RBC AUTO-ENTMCNC: 32.7 G/DL (ref 32–36)
MCV RBC AUTO: 87 FL (ref 80–100)
MONOCYTES # BLD AUTO: 0.6 THOU/UL (ref 0–0.9)
MONOCYTES NFR BLD AUTO: 14 % (ref 2–10)
NEUTROPHILS # BLD AUTO: 2.8 THOU/UL (ref 2–7.7)
NEUTROPHILS NFR BLD AUTO: 68 % (ref 50–70)
PLATELET # BLD AUTO: 175 THOU/UL (ref 140–440)
PMV BLD AUTO: 9.5 FL (ref 8.5–12.5)
POTASSIUM BLD-SCNC: 4 MMOL/L (ref 3.5–5)
PROT SERPL-MCNC: 6 G/DL (ref 6–8)
PSA SERPL-MCNC: <0.1 NG/ML (ref 0–3.5)
RBC # BLD AUTO: 4.2 MILL/UL (ref 4.4–6.2)
SODIUM SERPL-SCNC: 140 MMOL/L (ref 136–145)
WBC: 4.1 THOU/UL (ref 4–11)

## 2018-09-14 ENCOUNTER — COMMUNICATION - HEALTHEAST (OUTPATIENT)
Dept: ONCOLOGY | Facility: HOSPITAL | Age: 53
End: 2018-09-14

## 2018-10-26 ENCOUNTER — RECORDS - HEALTHEAST (OUTPATIENT)
Dept: ADMINISTRATIVE | Facility: OTHER | Age: 53
End: 2018-10-26

## 2018-11-06 ENCOUNTER — INFUSION - HEALTHEAST (OUTPATIENT)
Dept: INFUSION THERAPY | Facility: HOSPITAL | Age: 53
End: 2018-11-06

## 2018-11-06 DIAGNOSIS — C82.08 FOLLICULAR LYMPHOMA GRADE I, LYMPH NODES OF MULTIPLE SITES (H): ICD-10-CM

## 2018-11-06 LAB
ALBUMIN SERPL-MCNC: 3.6 G/DL (ref 3.5–5)
ALP SERPL-CCNC: 72 U/L (ref 45–120)
ALT SERPL W P-5'-P-CCNC: 19 U/L (ref 0–45)
ANION GAP SERPL CALCULATED.3IONS-SCNC: 9 MMOL/L (ref 5–18)
AST SERPL W P-5'-P-CCNC: 13 U/L (ref 0–40)
BASOPHILS # BLD AUTO: 0.1 THOU/UL (ref 0–0.2)
BASOPHILS NFR BLD AUTO: 1 % (ref 0–2)
BILIRUB SERPL-MCNC: 0.3 MG/DL (ref 0–1)
BUN SERPL-MCNC: 12 MG/DL (ref 8–22)
CALCIUM SERPL-MCNC: 9.3 MG/DL (ref 8.5–10.5)
CHLORIDE BLD-SCNC: 107 MMOL/L (ref 98–107)
CO2 SERPL-SCNC: 26 MMOL/L (ref 22–31)
CREAT SERPL-MCNC: 0.88 MG/DL (ref 0.7–1.3)
EOSINOPHIL # BLD AUTO: 0.2 THOU/UL (ref 0–0.4)
EOSINOPHIL NFR BLD AUTO: 3 % (ref 0–6)
ERYTHROCYTE [DISTWIDTH] IN BLOOD BY AUTOMATED COUNT: 15 % (ref 11–14.5)
GFR SERPL CREATININE-BSD FRML MDRD: >60 ML/MIN/1.73M2
GLUCOSE BLD-MCNC: 131 MG/DL (ref 70–125)
HCT VFR BLD AUTO: 35.2 % (ref 40–54)
HGB BLD-MCNC: 11.3 G/DL (ref 14–18)
LDH SERPL L TO P-CCNC: 207 U/L (ref 125–220)
LYMPHOCYTES # BLD AUTO: 0.7 THOU/UL (ref 0.8–4.4)
LYMPHOCYTES NFR BLD AUTO: 11 % (ref 20–40)
MCH RBC QN AUTO: 28 PG (ref 27–34)
MCHC RBC AUTO-ENTMCNC: 32.1 G/DL (ref 32–36)
MCV RBC AUTO: 87 FL (ref 80–100)
MONOCYTES # BLD AUTO: 0.5 THOU/UL (ref 0–0.9)
MONOCYTES NFR BLD AUTO: 7 % (ref 2–10)
NEUTROPHILS # BLD AUTO: 5.3 THOU/UL (ref 2–7.7)
NEUTROPHILS NFR BLD AUTO: 79 % (ref 50–70)
PLATELET # BLD AUTO: 196 THOU/UL (ref 140–440)
PMV BLD AUTO: 9 FL (ref 8.5–12.5)
POTASSIUM BLD-SCNC: 4 MMOL/L (ref 3.5–5)
PROT SERPL-MCNC: 5.9 G/DL (ref 6–8)
RBC # BLD AUTO: 4.04 MILL/UL (ref 4.4–6.2)
SODIUM SERPL-SCNC: 142 MMOL/L (ref 136–145)
WBC: 6.9 THOU/UL (ref 4–11)

## 2018-12-28 ENCOUNTER — COMMUNICATION - HEALTHEAST (OUTPATIENT)
Dept: ONCOLOGY | Facility: HOSPITAL | Age: 53
End: 2018-12-28

## 2018-12-31 ENCOUNTER — HOSPITAL ENCOUNTER (OUTPATIENT)
Dept: CT IMAGING | Facility: HOSPITAL | Age: 53
Setting detail: RADIATION/ONCOLOGY SERIES
Discharge: STILL ADMITTED OR ADMINISTRATIVE | End: 2018-12-31
Attending: INTERNAL MEDICINE

## 2018-12-31 DIAGNOSIS — C82.08 FOLLICULAR LYMPHOMA GRADE I, LYMPH NODES OF MULTIPLE SITES (H): ICD-10-CM

## 2018-12-31 DIAGNOSIS — C61 PROSTATE CANCER (H): ICD-10-CM

## 2019-01-02 ENCOUNTER — AMBULATORY - HEALTHEAST (OUTPATIENT)
Dept: INFUSION THERAPY | Facility: HOSPITAL | Age: 54
End: 2019-01-02

## 2019-01-02 ENCOUNTER — OFFICE VISIT - HEALTHEAST (OUTPATIENT)
Dept: ONCOLOGY | Facility: HOSPITAL | Age: 54
End: 2019-01-02

## 2019-01-02 ENCOUNTER — INFUSION - HEALTHEAST (OUTPATIENT)
Dept: INFUSION THERAPY | Facility: HOSPITAL | Age: 54
End: 2019-01-02

## 2019-01-02 DIAGNOSIS — C82.08 FOLLICULAR LYMPHOMA GRADE I, LYMPH NODES OF MULTIPLE SITES (H): ICD-10-CM

## 2019-01-02 DIAGNOSIS — C61 PROSTATE CANCER (H): ICD-10-CM

## 2019-01-02 LAB
ALBUMIN SERPL-MCNC: 3.9 G/DL (ref 3.5–5)
ALP SERPL-CCNC: 66 U/L (ref 45–120)
ALT SERPL W P-5'-P-CCNC: 38 U/L (ref 0–45)
ANION GAP SERPL CALCULATED.3IONS-SCNC: 7 MMOL/L (ref 5–18)
AST SERPL W P-5'-P-CCNC: 22 U/L (ref 0–40)
BASOPHILS # BLD AUTO: 0 THOU/UL (ref 0–0.2)
BASOPHILS NFR BLD AUTO: 0 % (ref 0–2)
BILIRUB SERPL-MCNC: 0.3 MG/DL (ref 0–1)
BUN SERPL-MCNC: 13 MG/DL (ref 8–22)
CALCIUM SERPL-MCNC: 9.4 MG/DL (ref 8.5–10.5)
CHLORIDE BLD-SCNC: 105 MMOL/L (ref 98–107)
CO2 SERPL-SCNC: 27 MMOL/L (ref 22–31)
CREAT SERPL-MCNC: 0.95 MG/DL (ref 0.7–1.3)
EOSINOPHIL # BLD AUTO: 0.2 THOU/UL (ref 0–0.4)
EOSINOPHIL NFR BLD AUTO: 4 % (ref 0–6)
ERYTHROCYTE [DISTWIDTH] IN BLOOD BY AUTOMATED COUNT: 15.2 % (ref 11–14.5)
GFR SERPL CREATININE-BSD FRML MDRD: >60 ML/MIN/1.73M2
GLUCOSE BLD-MCNC: 135 MG/DL (ref 70–125)
HCT VFR BLD AUTO: 38.6 % (ref 40–54)
HGB BLD-MCNC: 12.7 G/DL (ref 14–18)
LYMPHOCYTES # BLD AUTO: 0.6 THOU/UL (ref 0.8–4.4)
LYMPHOCYTES NFR BLD AUTO: 13 % (ref 20–40)
MCH RBC QN AUTO: 29 PG (ref 27–34)
MCHC RBC AUTO-ENTMCNC: 32.9 G/DL (ref 32–36)
MCV RBC AUTO: 88 FL (ref 80–100)
MONOCYTES # BLD AUTO: 0.5 THOU/UL (ref 0–0.9)
MONOCYTES NFR BLD AUTO: 10 % (ref 2–10)
NEUTROPHILS # BLD AUTO: 3.6 THOU/UL (ref 2–7.7)
NEUTROPHILS NFR BLD AUTO: 73 % (ref 50–70)
PLATELET # BLD AUTO: 171 THOU/UL (ref 140–440)
PMV BLD AUTO: 9.2 FL (ref 8.5–12.5)
POTASSIUM BLD-SCNC: 4.2 MMOL/L (ref 3.5–5)
PROT SERPL-MCNC: 6.1 G/DL (ref 6–8)
RBC # BLD AUTO: 4.38 MILL/UL (ref 4.4–6.2)
SODIUM SERPL-SCNC: 139 MMOL/L (ref 136–145)
WBC: 5 THOU/UL (ref 4–11)

## 2019-01-02 ASSESSMENT — MIFFLIN-ST. JEOR: SCORE: 1925.79

## 2019-01-07 ENCOUNTER — HOSPITAL ENCOUNTER (OUTPATIENT)
Dept: ULTRASOUND IMAGING | Facility: HOSPITAL | Age: 54
Setting detail: RADIATION/ONCOLOGY SERIES
Discharge: STILL ADMITTED OR ADMINISTRATIVE | End: 2019-01-07
Attending: INTERNAL MEDICINE

## 2019-01-07 DIAGNOSIS — C82.08 FOLLICULAR LYMPHOMA GRADE I, LYMPH NODES OF MULTIPLE SITES (H): ICD-10-CM

## 2019-01-14 ENCOUNTER — OFFICE VISIT - HEALTHEAST (OUTPATIENT)
Dept: ONCOLOGY | Facility: HOSPITAL | Age: 54
End: 2019-01-14

## 2019-01-14 ENCOUNTER — AMBULATORY - HEALTHEAST (OUTPATIENT)
Dept: INFUSION THERAPY | Facility: HOSPITAL | Age: 54
End: 2019-01-14

## 2019-01-14 DIAGNOSIS — C61 PROSTATE CANCER (H): ICD-10-CM

## 2019-01-14 DIAGNOSIS — C82.08 FOLLICULAR LYMPHOMA GRADE I, LYMPH NODES OF MULTIPLE SITES (H): ICD-10-CM

## 2019-01-14 LAB — PSA SERPL-MCNC: <0.1 NG/ML (ref 0–3.5)

## 2019-01-15 LAB
LAB AP CHARGES (HE HISTORICAL CONVERSION): ABNORMAL
LAB AP INITIAL CYTO EVAL (HE HISTORICAL CONVERSION): ABNORMAL
LAB MED GENERAL PATH INTERP (HE HISTORICAL CONVERSION): ABNORMAL
PATH REPORT.ADDENDUM SPEC: ABNORMAL
PATH REPORT.COMMENTS IMP SPEC: ABNORMAL
PATH REPORT.COMMENTS IMP SPEC: ABNORMAL
PATH REPORT.FINAL DX SPEC: ABNORMAL
PATH REPORT.MICROSCOPIC SPEC OTHER STN: ABNORMAL
PATH REPORT.MICROSCOPIC SPEC OTHER STN: ABNORMAL
PATH REPORT.RELEVANT HX SPEC: ABNORMAL
SPECIMEN DESCRIPTION: ABNORMAL

## 2019-01-25 ENCOUNTER — HOSPITAL ENCOUNTER (OUTPATIENT)
Dept: PET IMAGING | Facility: HOSPITAL | Age: 54
Setting detail: RADIATION/ONCOLOGY SERIES
Discharge: STILL ADMITTED OR ADMINISTRATIVE | End: 2019-01-25
Attending: INTERNAL MEDICINE

## 2019-01-25 ENCOUNTER — OFFICE VISIT - HEALTHEAST (OUTPATIENT)
Dept: RADIATION ONCOLOGY | Facility: HOSPITAL | Age: 54
End: 2019-01-25

## 2019-01-25 DIAGNOSIS — C61 PROSTATE CANCER (H): ICD-10-CM

## 2019-01-25 DIAGNOSIS — C82.08 FOLLICULAR LYMPHOMA GRADE I, LYMPH NODES OF MULTIPLE SITES (H): ICD-10-CM

## 2019-01-25 LAB — GLUCOSE BLDC GLUCOMTR-MCNC: 94 MG/DL (ref 70–139)

## 2019-01-29 ENCOUNTER — COMMUNICATION - HEALTHEAST (OUTPATIENT)
Dept: ONCOLOGY | Facility: HOSPITAL | Age: 54
End: 2019-01-29

## 2019-01-30 ENCOUNTER — OFFICE VISIT - HEALTHEAST (OUTPATIENT)
Dept: ONCOLOGY | Facility: HOSPITAL | Age: 54
End: 2019-01-30

## 2019-01-30 DIAGNOSIS — Z51.11 ENCOUNTER FOR CHEMOTHERAPY MANAGEMENT: ICD-10-CM

## 2019-01-30 DIAGNOSIS — Z79.899 ENCOUNTER FOR MONITORING CARDIOTOXIC DRUG THERAPY: ICD-10-CM

## 2019-01-30 DIAGNOSIS — Z51.81 ENCOUNTER FOR MONITORING CARDIOTOXIC DRUG THERAPY: ICD-10-CM

## 2019-01-31 ENCOUNTER — HOSPITAL ENCOUNTER (OUTPATIENT)
Dept: NUCLEAR MEDICINE | Facility: HOSPITAL | Age: 54
Setting detail: RADIATION/ONCOLOGY SERIES
Discharge: STILL ADMITTED OR ADMINISTRATIVE | End: 2019-01-31
Attending: INTERNAL MEDICINE

## 2019-01-31 ENCOUNTER — AMBULATORY - HEALTHEAST (OUTPATIENT)
Dept: ONCOLOGY | Facility: HOSPITAL | Age: 54
End: 2019-01-31

## 2019-01-31 ENCOUNTER — COMMUNICATION - HEALTHEAST (OUTPATIENT)
Dept: ONCOLOGY | Facility: HOSPITAL | Age: 54
End: 2019-01-31

## 2019-01-31 DIAGNOSIS — Z51.81 ENCOUNTER FOR MONITORING CARDIOTOXIC DRUG THERAPY: ICD-10-CM

## 2019-01-31 DIAGNOSIS — Z79.899 ENCOUNTER FOR MONITORING CARDIOTOXIC DRUG THERAPY: ICD-10-CM

## 2019-01-31 LAB — MUGA LV EJECTION FRACTION: 65.62 %

## 2019-01-31 ASSESSMENT — MIFFLIN-ST. JEOR: SCORE: 1944.84

## 2019-02-08 ENCOUNTER — COMMUNICATION - HEALTHEAST (OUTPATIENT)
Dept: ONCOLOGY | Facility: HOSPITAL | Age: 54
End: 2019-02-08

## 2019-02-11 ENCOUNTER — COMMUNICATION - HEALTHEAST (OUTPATIENT)
Dept: ONCOLOGY | Facility: HOSPITAL | Age: 54
End: 2019-02-11

## 2019-03-05 ENCOUNTER — RECORDS - HEALTHEAST (OUTPATIENT)
Dept: LAB | Facility: CLINIC | Age: 54
End: 2019-03-05

## 2019-03-06 ENCOUNTER — RECORDS - HEALTHEAST (OUTPATIENT)
Dept: LAB | Facility: CLINIC | Age: 54
End: 2019-03-06

## 2019-03-06 LAB — PSA SERPL-MCNC: <0.1 NG/ML (ref 0–3.5)

## 2019-03-07 LAB — BACTERIA SPEC CULT: NORMAL

## 2019-03-14 ENCOUNTER — RECORDS - HEALTHEAST (OUTPATIENT)
Dept: ADMINISTRATIVE | Facility: OTHER | Age: 54
End: 2019-03-14

## 2019-03-15 ENCOUNTER — AMBULATORY - HEALTHEAST (OUTPATIENT)
Dept: INFUSION THERAPY | Facility: HOSPITAL | Age: 54
End: 2019-03-15

## 2019-03-15 ENCOUNTER — INFUSION - HEALTHEAST (OUTPATIENT)
Dept: INFUSION THERAPY | Facility: HOSPITAL | Age: 54
End: 2019-03-15

## 2019-03-15 DIAGNOSIS — C82.08 FOLLICULAR LYMPHOMA GRADE I, LYMPH NODES OF MULTIPLE SITES (H): ICD-10-CM

## 2019-03-15 DIAGNOSIS — Z98.890 HISTORY OF VASCULAR ACCESS DEVICE: ICD-10-CM

## 2019-03-15 LAB
BASOPHILS # BLD AUTO: 0 THOU/UL (ref 0–0.2)
BASOPHILS NFR BLD AUTO: 1 % (ref 0–2)
EOSINOPHIL # BLD AUTO: 0.2 THOU/UL (ref 0–0.4)
EOSINOPHIL NFR BLD AUTO: 3 % (ref 0–6)
ERYTHROCYTE [DISTWIDTH] IN BLOOD BY AUTOMATED COUNT: 14.7 % (ref 11–14.5)
HCT VFR BLD AUTO: 36.7 % (ref 40–54)
HGB BLD-MCNC: 12.1 G/DL (ref 14–18)
IGA SERPL-MCNC: 12 MG/DL (ref 65–400)
IGA SERPL-MCNC: 315 MG/DL
IGM SERPL-MCNC: 7 MG/DL (ref 60–280)
LYMPHOCYTES # BLD AUTO: 0.5 THOU/UL (ref 0.8–4.4)
LYMPHOCYTES NFR BLD AUTO: 9 % (ref 20–40)
MCH RBC QN AUTO: 29.9 PG (ref 27–34)
MCHC RBC AUTO-ENTMCNC: 33 G/DL (ref 32–36)
MCV RBC AUTO: 91 FL (ref 80–100)
MONOCYTES # BLD AUTO: 0.5 THOU/UL (ref 0–0.9)
MONOCYTES NFR BLD AUTO: 9 % (ref 2–10)
NEUTROPHILS # BLD AUTO: 4.4 THOU/UL (ref 2–7.7)
NEUTROPHILS NFR BLD AUTO: 78 % (ref 50–70)
PLATELET # BLD AUTO: 200 THOU/UL (ref 140–440)
PMV BLD AUTO: 8.6 FL (ref 8.5–12.5)
RBC # BLD AUTO: 4.05 MILL/UL (ref 4.4–6.2)
WBC: 5.8 THOU/UL (ref 4–11)

## 2019-03-19 ENCOUNTER — AMBULATORY - HEALTHEAST (OUTPATIENT)
Dept: ONCOLOGY | Facility: HOSPITAL | Age: 54
End: 2019-03-19

## 2019-03-19 LAB — TOTAL IGE - HISTORICAL: 4.25 KU/L (ref 0–100)

## 2019-04-02 ENCOUNTER — AMBULATORY - HEALTHEAST (OUTPATIENT)
Dept: ONCOLOGY | Facility: HOSPITAL | Age: 54
End: 2019-04-02

## 2019-04-02 DIAGNOSIS — C82.08 FOLLICULAR LYMPHOMA GRADE I, LYMPH NODES OF MULTIPLE SITES (H): ICD-10-CM

## 2019-04-03 ENCOUNTER — AMBULATORY - HEALTHEAST (OUTPATIENT)
Dept: ONCOLOGY | Facility: HOSPITAL | Age: 54
End: 2019-04-03

## 2019-04-08 ENCOUNTER — HOSPITAL ENCOUNTER (OUTPATIENT)
Dept: CT IMAGING | Facility: HOSPITAL | Age: 54
Setting detail: RADIATION/ONCOLOGY SERIES
Discharge: STILL ADMITTED OR ADMINISTRATIVE | End: 2019-04-08
Attending: INTERNAL MEDICINE

## 2019-04-08 ENCOUNTER — RECORDS - HEALTHEAST (OUTPATIENT)
Dept: ADMINISTRATIVE | Facility: OTHER | Age: 54
End: 2019-04-08

## 2019-04-08 DIAGNOSIS — C82.08 FOLLICULAR LYMPHOMA GRADE I, LYMPH NODES OF MULTIPLE SITES (H): ICD-10-CM

## 2019-04-10 ENCOUNTER — AMBULATORY - HEALTHEAST (OUTPATIENT)
Dept: ONCOLOGY | Facility: HOSPITAL | Age: 54
End: 2019-04-10

## 2019-04-11 ENCOUNTER — AMBULATORY - HEALTHEAST (OUTPATIENT)
Dept: ONCOLOGY | Facility: HOSPITAL | Age: 54
End: 2019-04-11

## 2019-04-11 DIAGNOSIS — D80.6 IMMUNODEFICIENCY DISORDER DUE TO ANTIBODY DEFICIENCY (H): ICD-10-CM

## 2019-04-17 ENCOUNTER — AMBULATORY - HEALTHEAST (OUTPATIENT)
Dept: INFUSION THERAPY | Facility: HOSPITAL | Age: 54
End: 2019-04-17

## 2019-04-22 ENCOUNTER — RECORDS - HEALTHEAST (OUTPATIENT)
Dept: ADMINISTRATIVE | Facility: OTHER | Age: 54
End: 2019-04-22

## 2019-04-22 ENCOUNTER — OFFICE VISIT - HEALTHEAST (OUTPATIENT)
Dept: ONCOLOGY | Facility: HOSPITAL | Age: 54
End: 2019-04-22

## 2019-04-22 ENCOUNTER — AMBULATORY - HEALTHEAST (OUTPATIENT)
Dept: INFUSION THERAPY | Facility: HOSPITAL | Age: 54
End: 2019-04-22

## 2019-04-22 DIAGNOSIS — C82.08 FOLLICULAR LYMPHOMA GRADE I, LYMPH NODES OF MULTIPLE SITES (H): ICD-10-CM

## 2019-04-22 DIAGNOSIS — Z51.11 ENCOUNTER FOR CHEMOTHERAPY MANAGEMENT: ICD-10-CM

## 2019-04-22 LAB
ALBUMIN SERPL-MCNC: 3.9 G/DL (ref 3.5–5)
ALP SERPL-CCNC: 77 U/L (ref 45–120)
ALT SERPL W P-5'-P-CCNC: 35 U/L (ref 0–45)
ANION GAP SERPL CALCULATED.3IONS-SCNC: 7 MMOL/L (ref 5–18)
AST SERPL W P-5'-P-CCNC: 22 U/L (ref 0–40)
BASOPHILS # BLD AUTO: 0 THOU/UL (ref 0–0.2)
BASOPHILS NFR BLD AUTO: 1 % (ref 0–2)
BILIRUB SERPL-MCNC: 0.4 MG/DL (ref 0–1)
BUN SERPL-MCNC: 14 MG/DL (ref 8–22)
CALCIUM SERPL-MCNC: 9.5 MG/DL (ref 8.5–10.5)
CHLORIDE BLD-SCNC: 108 MMOL/L (ref 98–107)
CO2 SERPL-SCNC: 27 MMOL/L (ref 22–31)
CREAT SERPL-MCNC: 0.93 MG/DL (ref 0.7–1.3)
EOSINOPHIL COUNT (ABSOLUTE): 0.2 THOU/UL (ref 0–0.4)
EOSINOPHIL NFR BLD AUTO: 4 % (ref 0–6)
ERYTHROCYTE [DISTWIDTH] IN BLOOD BY AUTOMATED COUNT: 14.4 % (ref 11–14.5)
GFR SERPL CREATININE-BSD FRML MDRD: >60 ML/MIN/1.73M2
GLUCOSE BLD-MCNC: 124 MG/DL (ref 70–125)
HCT VFR BLD AUTO: 37.5 % (ref 40–54)
HGB BLD-MCNC: 12.5 G/DL (ref 14–18)
LYMPHOCYTES # BLD AUTO: 0.6 THOU/UL (ref 0.8–4.4)
LYMPHOCYTES NFR BLD AUTO: 13 % (ref 20–40)
MCH RBC QN AUTO: 30.7 PG (ref 27–34)
MCHC RBC AUTO-ENTMCNC: 33.3 G/DL (ref 32–36)
MCV RBC AUTO: 92 FL (ref 80–100)
MONOCYTES # BLD AUTO: 0.2 THOU/UL (ref 0–0.9)
MONOCYTES NFR BLD AUTO: 5 % (ref 2–10)
PLAT MORPH BLD: NORMAL
PLATELET # BLD AUTO: 165 THOU/UL (ref 140–440)
PMV BLD AUTO: 9.3 FL (ref 8.5–12.5)
POTASSIUM BLD-SCNC: 4.4 MMOL/L (ref 3.5–5)
PROT SERPL-MCNC: 6.1 G/DL (ref 6–8)
RBC # BLD AUTO: 4.07 MILL/UL (ref 4.4–6.2)
SODIUM SERPL-SCNC: 142 MMOL/L (ref 136–145)
TOTAL NEUTROPHILS-ABS(DIFF): 3.8 THOU/UL (ref 2–7.7)
TOTAL NEUTROPHILS-REL(DIFF): 77 % (ref 50–70)
WBC: 4.9 THOU/UL (ref 4–11)

## 2019-04-23 ENCOUNTER — INFUSION - HEALTHEAST (OUTPATIENT)
Dept: INFUSION THERAPY | Facility: HOSPITAL | Age: 54
End: 2019-04-23

## 2019-04-23 DIAGNOSIS — C82.08 FOLLICULAR LYMPHOMA GRADE I, LYMPH NODES OF MULTIPLE SITES (H): ICD-10-CM

## 2019-04-23 DIAGNOSIS — Z51.11 ENCOUNTER FOR CHEMOTHERAPY MANAGEMENT: ICD-10-CM

## 2019-04-29 ENCOUNTER — INFUSION - HEALTHEAST (OUTPATIENT)
Dept: INFUSION THERAPY | Facility: HOSPITAL | Age: 54
End: 2019-04-29

## 2019-04-29 DIAGNOSIS — Z51.11 ENCOUNTER FOR CHEMOTHERAPY MANAGEMENT: ICD-10-CM

## 2019-04-29 DIAGNOSIS — C82.08 FOLLICULAR LYMPHOMA GRADE I, LYMPH NODES OF MULTIPLE SITES (H): ICD-10-CM

## 2019-04-29 LAB
ALBUMIN SERPL-MCNC: 3.6 G/DL (ref 3.5–5)
ALP SERPL-CCNC: 62 U/L (ref 45–120)
ALT SERPL W P-5'-P-CCNC: 39 U/L (ref 0–45)
ANION GAP SERPL CALCULATED.3IONS-SCNC: 7 MMOL/L (ref 5–18)
AST SERPL W P-5'-P-CCNC: 15 U/L (ref 0–40)
BASOPHILS # BLD AUTO: 0 THOU/UL (ref 0–0.2)
BASOPHILS NFR BLD AUTO: 0 % (ref 0–2)
BILIRUB SERPL-MCNC: 0.4 MG/DL (ref 0–1)
BUN SERPL-MCNC: 18 MG/DL (ref 8–22)
CALCIUM SERPL-MCNC: 9.3 MG/DL (ref 8.5–10.5)
CHLORIDE BLD-SCNC: 103 MMOL/L (ref 98–107)
CO2 SERPL-SCNC: 29 MMOL/L (ref 22–31)
CREAT SERPL-MCNC: 0.93 MG/DL (ref 0.7–1.3)
EOSINOPHIL COUNT (ABSOLUTE): 0.1 THOU/UL (ref 0–0.4)
EOSINOPHIL NFR BLD AUTO: 12 % (ref 0–6)
ERYTHROCYTE [DISTWIDTH] IN BLOOD BY AUTOMATED COUNT: 13.7 % (ref 11–14.5)
GFR SERPL CREATININE-BSD FRML MDRD: >60 ML/MIN/1.73M2
GLUCOSE BLD-MCNC: 118 MG/DL (ref 70–125)
HCT VFR BLD AUTO: 34.1 % (ref 40–54)
HGB BLD-MCNC: 11.6 G/DL (ref 14–18)
LYMPHOCYTES # BLD AUTO: 0.1 THOU/UL (ref 0.8–4.4)
LYMPHOCYTES NFR BLD AUTO: 28 % (ref 20–40)
MCH RBC QN AUTO: 30.7 PG (ref 27–34)
MCHC RBC AUTO-ENTMCNC: 34 G/DL (ref 32–36)
MCV RBC AUTO: 90 FL (ref 80–100)
MONOCYTES # BLD AUTO: 0 THOU/UL (ref 0–0.9)
MONOCYTES NFR BLD AUTO: 6 % (ref 2–10)
PLAT MORPH BLD: ABNORMAL
PLATELET # BLD AUTO: 125 THOU/UL (ref 140–440)
PMV BLD AUTO: 9.1 FL (ref 8.5–12.5)
POTASSIUM BLD-SCNC: 3.8 MMOL/L (ref 3.5–5)
PROT SERPL-MCNC: 5.7 G/DL (ref 6–8)
RBC # BLD AUTO: 3.78 MILL/UL (ref 4.4–6.2)
SODIUM SERPL-SCNC: 139 MMOL/L (ref 136–145)
TOTAL NEUTROPHILS-ABS(DIFF): 0.3 THOU/UL (ref 2–7.7)
TOTAL NEUTROPHILS-REL(DIFF): 54 % (ref 50–70)
WBC: 0.5 THOU/UL (ref 4–11)

## 2019-04-30 ENCOUNTER — AMBULATORY - HEALTHEAST (OUTPATIENT)
Dept: ONCOLOGY | Facility: HOSPITAL | Age: 54
End: 2019-04-30

## 2019-05-02 ENCOUNTER — COMMUNICATION - HEALTHEAST (OUTPATIENT)
Dept: ONCOLOGY | Facility: HOSPITAL | Age: 54
End: 2019-05-02

## 2019-05-06 ENCOUNTER — INFUSION - HEALTHEAST (OUTPATIENT)
Dept: INFUSION THERAPY | Facility: HOSPITAL | Age: 54
End: 2019-05-06

## 2019-05-06 ENCOUNTER — AMBULATORY - HEALTHEAST (OUTPATIENT)
Dept: ONCOLOGY | Facility: HOSPITAL | Age: 54
End: 2019-05-06

## 2019-05-06 DIAGNOSIS — K13.79 MOUTH SORES: ICD-10-CM

## 2019-05-06 DIAGNOSIS — C82.08 FOLLICULAR LYMPHOMA GRADE I, LYMPH NODES OF MULTIPLE SITES (H): ICD-10-CM

## 2019-05-06 DIAGNOSIS — Z51.11 ENCOUNTER FOR CHEMOTHERAPY MANAGEMENT: ICD-10-CM

## 2019-05-13 ENCOUNTER — AMBULATORY - HEALTHEAST (OUTPATIENT)
Dept: INFUSION THERAPY | Facility: HOSPITAL | Age: 54
End: 2019-05-13

## 2019-05-13 ENCOUNTER — INFUSION - HEALTHEAST (OUTPATIENT)
Dept: INFUSION THERAPY | Facility: HOSPITAL | Age: 54
End: 2019-05-13

## 2019-05-13 ENCOUNTER — OFFICE VISIT - HEALTHEAST (OUTPATIENT)
Dept: ONCOLOGY | Facility: HOSPITAL | Age: 54
End: 2019-05-13

## 2019-05-13 DIAGNOSIS — C61 PROSTATE CANCER (H): ICD-10-CM

## 2019-05-13 DIAGNOSIS — C82.08 FOLLICULAR LYMPHOMA GRADE I, LYMPH NODES OF MULTIPLE SITES (H): ICD-10-CM

## 2019-05-13 DIAGNOSIS — Z51.11 ENCOUNTER FOR CHEMOTHERAPY MANAGEMENT: ICD-10-CM

## 2019-05-13 LAB
ALBUMIN SERPL-MCNC: 3.7 G/DL (ref 3.5–5)
ALP SERPL-CCNC: 71 U/L (ref 45–120)
ALT SERPL W P-5'-P-CCNC: 44 U/L (ref 0–45)
ANION GAP SERPL CALCULATED.3IONS-SCNC: 10 MMOL/L (ref 5–18)
AST SERPL W P-5'-P-CCNC: 28 U/L (ref 0–40)
BASOPHILS # BLD AUTO: 0 THOU/UL (ref 0–0.2)
BASOPHILS NFR BLD AUTO: 0 % (ref 0–2)
BILIRUB SERPL-MCNC: 0.4 MG/DL (ref 0–1)
BUN SERPL-MCNC: 16 MG/DL (ref 8–22)
CALCIUM SERPL-MCNC: 9.4 MG/DL (ref 8.5–10.5)
CHLORIDE BLD-SCNC: 105 MMOL/L (ref 98–107)
CO2 SERPL-SCNC: 24 MMOL/L (ref 22–31)
CREAT SERPL-MCNC: 1.08 MG/DL (ref 0.7–1.3)
EOSINOPHIL COUNT (ABSOLUTE): 0 THOU/UL (ref 0–0.4)
EOSINOPHIL NFR BLD AUTO: 1 % (ref 0–6)
ERYTHROCYTE [DISTWIDTH] IN BLOOD BY AUTOMATED COUNT: 14.2 % (ref 11–14.5)
GFR SERPL CREATININE-BSD FRML MDRD: >60 ML/MIN/1.73M2
GLUCOSE BLD-MCNC: 139 MG/DL (ref 70–125)
HCT VFR BLD AUTO: 34.8 % (ref 40–54)
HGB BLD-MCNC: 11.5 G/DL (ref 14–18)
LYMPHOCYTES # BLD AUTO: 0.5 THOU/UL (ref 0.8–4.4)
LYMPHOCYTES NFR BLD AUTO: 10 % (ref 20–40)
MCH RBC QN AUTO: 30.3 PG (ref 27–34)
MCHC RBC AUTO-ENTMCNC: 33 G/DL (ref 32–36)
MCV RBC AUTO: 92 FL (ref 80–100)
METAMYELOCYTES (ABSOLUTE): 0.1 THOU/UL
METAMYELOCYTES NFR BLD MANUAL: 3 %
MONOCYTES # BLD AUTO: 0.4 THOU/UL (ref 0–0.9)
MONOCYTES NFR BLD AUTO: 8 % (ref 2–10)
MYELOCYTES (ABSOLUTE): 0 THOU/UL
MYELOCYTES NFR BLD MANUAL: 1 %
PLAT MORPH BLD: NORMAL
PLATELET # BLD AUTO: 198 THOU/UL (ref 140–440)
PMV BLD AUTO: 8.9 FL (ref 8.5–12.5)
POTASSIUM BLD-SCNC: 4.1 MMOL/L (ref 3.5–5)
PROT SERPL-MCNC: 6.1 G/DL (ref 6–8)
PSA SERPL-MCNC: 0.2 NG/ML (ref 0–3.5)
RBC # BLD AUTO: 3.8 MILL/UL (ref 4.4–6.2)
SODIUM SERPL-SCNC: 139 MMOL/L (ref 136–145)
TOTAL NEUTROPHILS-ABS(DIFF): 3.5 THOU/UL (ref 2–7.7)
TOTAL NEUTROPHILS-REL(DIFF): 77 % (ref 50–70)
WBC: 4.5 THOU/UL (ref 4–11)

## 2019-05-20 ENCOUNTER — INFUSION - HEALTHEAST (OUTPATIENT)
Dept: INFUSION THERAPY | Facility: HOSPITAL | Age: 54
End: 2019-05-20

## 2019-05-20 DIAGNOSIS — C82.08 FOLLICULAR LYMPHOMA GRADE I, LYMPH NODES OF MULTIPLE SITES (H): ICD-10-CM

## 2019-05-20 DIAGNOSIS — D80.6 IMMUNODEFICIENCY DISORDER DUE TO ANTIBODY DEFICIENCY (H): ICD-10-CM

## 2019-05-21 ENCOUNTER — COMMUNICATION - HEALTHEAST (OUTPATIENT)
Dept: ONCOLOGY | Facility: HOSPITAL | Age: 54
End: 2019-05-21

## 2019-05-28 ENCOUNTER — INFUSION - HEALTHEAST (OUTPATIENT)
Dept: INFUSION THERAPY | Facility: HOSPITAL | Age: 54
End: 2019-05-28

## 2019-05-28 DIAGNOSIS — C82.08 FOLLICULAR LYMPHOMA GRADE I, LYMPH NODES OF MULTIPLE SITES (H): ICD-10-CM

## 2019-05-28 DIAGNOSIS — D80.6 IMMUNODEFICIENCY DISORDER DUE TO ANTIBODY DEFICIENCY (H): ICD-10-CM

## 2019-05-31 ENCOUNTER — OFFICE VISIT - HEALTHEAST (OUTPATIENT)
Dept: ONCOLOGY | Facility: HOSPITAL | Age: 54
End: 2019-05-31

## 2019-05-31 ENCOUNTER — AMBULATORY - HEALTHEAST (OUTPATIENT)
Dept: INFUSION THERAPY | Facility: HOSPITAL | Age: 54
End: 2019-05-31

## 2019-05-31 DIAGNOSIS — C82.08 FOLLICULAR LYMPHOMA GRADE I, LYMPH NODES OF MULTIPLE SITES (H): ICD-10-CM

## 2019-05-31 DIAGNOSIS — C61 PROSTATE CANCER (H): ICD-10-CM

## 2019-05-31 DIAGNOSIS — Z51.11 ENCOUNTER FOR CHEMOTHERAPY MANAGEMENT: ICD-10-CM

## 2019-05-31 LAB
ALBUMIN SERPL-MCNC: 3.7 G/DL (ref 3.5–5)
ALP SERPL-CCNC: 68 U/L (ref 45–120)
ALT SERPL W P-5'-P-CCNC: 76 U/L (ref 0–45)
ANION GAP SERPL CALCULATED.3IONS-SCNC: 9 MMOL/L (ref 5–18)
AST SERPL W P-5'-P-CCNC: 59 U/L (ref 0–40)
BASOPHILS # BLD AUTO: 0.1 THOU/UL (ref 0–0.2)
BASOPHILS NFR BLD AUTO: 1 % (ref 0–2)
BILIRUB SERPL-MCNC: 0.3 MG/DL (ref 0–1)
BUN SERPL-MCNC: 16 MG/DL (ref 8–22)
CALCIUM SERPL-MCNC: 9.5 MG/DL (ref 8.5–10.5)
CHLORIDE BLD-SCNC: 107 MMOL/L (ref 98–107)
CO2 SERPL-SCNC: 25 MMOL/L (ref 22–31)
CREAT SERPL-MCNC: 1.21 MG/DL (ref 0.7–1.3)
EOSINOPHIL COUNT (ABSOLUTE): 0.1 THOU/UL (ref 0–0.4)
EOSINOPHIL NFR BLD AUTO: 1 % (ref 0–6)
ERYTHROCYTE [DISTWIDTH] IN BLOOD BY AUTOMATED COUNT: 17.3 % (ref 11–14.5)
GFR SERPL CREATININE-BSD FRML MDRD: >60 ML/MIN/1.73M2
GLUCOSE BLD-MCNC: 110 MG/DL (ref 70–125)
HCT VFR BLD AUTO: 31.6 % (ref 40–54)
HGB BLD-MCNC: 10.4 G/DL (ref 14–18)
LYMPHOCYTES # BLD AUTO: 0.6 THOU/UL (ref 0.8–4.4)
LYMPHOCYTES NFR BLD AUTO: 9 % (ref 20–40)
MANUAL NRBC PER 100 CELLS: 1
MCH RBC QN AUTO: 30.6 PG (ref 27–34)
MCHC RBC AUTO-ENTMCNC: 32.9 G/DL (ref 32–36)
MCV RBC AUTO: 93 FL (ref 80–100)
MONOCYTES # BLD AUTO: 0.5 THOU/UL (ref 0–0.9)
MONOCYTES NFR BLD AUTO: 7 % (ref 2–10)
PLAT MORPH BLD: NORMAL
PLATELET # BLD AUTO: 193 THOU/UL (ref 140–440)
PMV BLD AUTO: 9.7 FL (ref 8.5–12.5)
POTASSIUM BLD-SCNC: 3.9 MMOL/L (ref 3.5–5)
PROT SERPL-MCNC: 7.3 G/DL (ref 6–8)
PSA SERPL-MCNC: 0.1 NG/ML (ref 0–3.5)
RBC # BLD AUTO: 3.4 MILL/UL (ref 4.4–6.2)
SODIUM SERPL-SCNC: 141 MMOL/L (ref 136–145)
TOTAL NEUTROPHILS-ABS(DIFF): 5.4 THOU/UL (ref 2–7.7)
TOTAL NEUTROPHILS-REL(DIFF): 82 % (ref 50–70)
WBC: 6.6 THOU/UL (ref 4–11)

## 2019-06-03 ENCOUNTER — INFUSION - HEALTHEAST (OUTPATIENT)
Dept: INFUSION THERAPY | Facility: HOSPITAL | Age: 54
End: 2019-06-03

## 2019-06-03 DIAGNOSIS — Z51.11 ENCOUNTER FOR CHEMOTHERAPY MANAGEMENT: ICD-10-CM

## 2019-06-03 DIAGNOSIS — C82.08 FOLLICULAR LYMPHOMA GRADE I, LYMPH NODES OF MULTIPLE SITES (H): ICD-10-CM

## 2019-06-12 ENCOUNTER — COMMUNICATION - HEALTHEAST (OUTPATIENT)
Dept: ONCOLOGY | Facility: HOSPITAL | Age: 54
End: 2019-06-12

## 2019-06-24 ENCOUNTER — HOSPITAL ENCOUNTER (OUTPATIENT)
Dept: PET IMAGING | Facility: HOSPITAL | Age: 54
Setting detail: RADIATION/ONCOLOGY SERIES
Discharge: STILL ADMITTED OR ADMINISTRATIVE | End: 2019-06-24
Attending: INTERNAL MEDICINE

## 2019-06-24 DIAGNOSIS — C82.08 FOLLICULAR LYMPHOMA GRADE I, LYMPH NODES OF MULTIPLE SITES (H): ICD-10-CM

## 2019-06-24 LAB — GLUCOSE BLDC GLUCOMTR-MCNC: 127 MG/DL (ref 70–139)

## 2019-06-28 ENCOUNTER — INFUSION - HEALTHEAST (OUTPATIENT)
Dept: INFUSION THERAPY | Facility: HOSPITAL | Age: 54
End: 2019-06-28

## 2019-06-28 ENCOUNTER — OFFICE VISIT - HEALTHEAST (OUTPATIENT)
Dept: ONCOLOGY | Facility: HOSPITAL | Age: 54
End: 2019-06-28

## 2019-06-28 ENCOUNTER — AMBULATORY - HEALTHEAST (OUTPATIENT)
Dept: INFUSION THERAPY | Facility: HOSPITAL | Age: 54
End: 2019-06-28

## 2019-06-28 DIAGNOSIS — C82.08 FOLLICULAR LYMPHOMA GRADE I, LYMPH NODES OF MULTIPLE SITES (H): ICD-10-CM

## 2019-06-28 DIAGNOSIS — Z51.11 ENCOUNTER FOR CHEMOTHERAPY MANAGEMENT: ICD-10-CM

## 2019-06-28 DIAGNOSIS — C61 PROSTATE CANCER (H): ICD-10-CM

## 2019-06-28 LAB
ALBUMIN SERPL-MCNC: 3.8 G/DL (ref 3.5–5)
ALP SERPL-CCNC: 57 U/L (ref 45–120)
ALT SERPL W P-5'-P-CCNC: 27 U/L (ref 0–45)
ANION GAP SERPL CALCULATED.3IONS-SCNC: 8 MMOL/L (ref 5–18)
AST SERPL W P-5'-P-CCNC: 19 U/L (ref 0–40)
BASOPHILS # BLD AUTO: 0 THOU/UL (ref 0–0.2)
BASOPHILS NFR BLD AUTO: 1 % (ref 0–2)
BILIRUB SERPL-MCNC: 0.5 MG/DL (ref 0–1)
BUN SERPL-MCNC: 15 MG/DL (ref 8–22)
CALCIUM SERPL-MCNC: 9.2 MG/DL (ref 8.5–10.5)
CHLORIDE BLD-SCNC: 105 MMOL/L (ref 98–107)
CO2 SERPL-SCNC: 26 MMOL/L (ref 22–31)
CREAT SERPL-MCNC: 0.98 MG/DL (ref 0.7–1.3)
EOSINOPHIL # BLD AUTO: 0.1 THOU/UL (ref 0–0.4)
EOSINOPHIL NFR BLD AUTO: 1 % (ref 0–6)
ERYTHROCYTE [DISTWIDTH] IN BLOOD BY AUTOMATED COUNT: 18 % (ref 11–14.5)
GFR SERPL CREATININE-BSD FRML MDRD: >60 ML/MIN/1.73M2
GLUCOSE BLD-MCNC: 174 MG/DL (ref 70–125)
HCT VFR BLD AUTO: 33 % (ref 40–54)
HGB BLD-MCNC: 10.5 G/DL (ref 14–18)
LYMPHOCYTES # BLD AUTO: 0.4 THOU/UL (ref 0.8–4.4)
LYMPHOCYTES NFR BLD AUTO: 7 % (ref 20–40)
MCH RBC QN AUTO: 30.5 PG (ref 27–34)
MCHC RBC AUTO-ENTMCNC: 31.8 G/DL (ref 32–36)
MCV RBC AUTO: 96 FL (ref 80–100)
MONOCYTES # BLD AUTO: 0.6 THOU/UL (ref 0–0.9)
MONOCYTES NFR BLD AUTO: 12 % (ref 2–10)
NEUTROPHILS # BLD AUTO: 3.8 THOU/UL (ref 2–7.7)
NEUTROPHILS NFR BLD AUTO: 80 % (ref 50–70)
PLATELET # BLD AUTO: 167 THOU/UL (ref 140–440)
PMV BLD AUTO: 9.1 FL (ref 8.5–12.5)
POTASSIUM BLD-SCNC: 4 MMOL/L (ref 3.5–5)
PROT SERPL-MCNC: 6.2 G/DL (ref 6–8)
RBC # BLD AUTO: 3.44 MILL/UL (ref 4.4–6.2)
SODIUM SERPL-SCNC: 139 MMOL/L (ref 136–145)
WBC: 4.9 THOU/UL (ref 4–11)

## 2019-07-18 ENCOUNTER — OFFICE VISIT - HEALTHEAST (OUTPATIENT)
Dept: ONCOLOGY | Facility: HOSPITAL | Age: 54
End: 2019-07-18

## 2019-07-18 ENCOUNTER — AMBULATORY - HEALTHEAST (OUTPATIENT)
Dept: INFUSION THERAPY | Facility: HOSPITAL | Age: 54
End: 2019-07-18

## 2019-07-18 DIAGNOSIS — Z51.11 ENCOUNTER FOR CHEMOTHERAPY MANAGEMENT: ICD-10-CM

## 2019-07-18 DIAGNOSIS — C61 PROSTATE CANCER (H): ICD-10-CM

## 2019-07-18 DIAGNOSIS — R25.2 CRAMP OF LIMB: ICD-10-CM

## 2019-07-18 DIAGNOSIS — C82.08 FOLLICULAR LYMPHOMA GRADE I, LYMPH NODES OF MULTIPLE SITES (H): ICD-10-CM

## 2019-07-18 LAB
ALBUMIN SERPL-MCNC: 3.8 G/DL (ref 3.5–5)
ALP SERPL-CCNC: 57 U/L (ref 45–120)
ALT SERPL W P-5'-P-CCNC: 30 U/L (ref 0–45)
ANION GAP SERPL CALCULATED.3IONS-SCNC: 8 MMOL/L (ref 5–18)
AST SERPL W P-5'-P-CCNC: 22 U/L (ref 0–40)
BASOPHILS # BLD AUTO: 0 THOU/UL (ref 0–0.2)
BASOPHILS NFR BLD AUTO: 0 % (ref 0–2)
BILIRUB SERPL-MCNC: 0.4 MG/DL (ref 0–1)
BUN SERPL-MCNC: 15 MG/DL (ref 8–22)
CALCIUM SERPL-MCNC: 9.4 MG/DL (ref 8.5–10.5)
CHLORIDE BLD-SCNC: 108 MMOL/L (ref 98–107)
CO2 SERPL-SCNC: 27 MMOL/L (ref 22–31)
CREAT SERPL-MCNC: 0.98 MG/DL (ref 0.7–1.3)
EOSINOPHIL COUNT (ABSOLUTE): 0 THOU/UL (ref 0–0.4)
EOSINOPHIL NFR BLD AUTO: 0 % (ref 0–6)
ERYTHROCYTE [DISTWIDTH] IN BLOOD BY AUTOMATED COUNT: 17.4 % (ref 11–14.5)
GFR SERPL CREATININE-BSD FRML MDRD: >60 ML/MIN/1.73M2
GLUCOSE BLD-MCNC: 106 MG/DL (ref 70–125)
HCT VFR BLD AUTO: 31.2 % (ref 40–54)
HGB BLD-MCNC: 10.3 G/DL (ref 14–18)
LYMPHOCYTES # BLD AUTO: 0.3 THOU/UL (ref 0.8–4.4)
LYMPHOCYTES NFR BLD AUTO: 5 % (ref 20–40)
MAGNESIUM SERPL-MCNC: 2 MG/DL (ref 1.8–2.6)
MCH RBC QN AUTO: 32.7 PG (ref 27–34)
MCHC RBC AUTO-ENTMCNC: 33 G/DL (ref 32–36)
MCV RBC AUTO: 99 FL (ref 80–100)
METAMYELOCYTES (ABSOLUTE): 0.1 THOU/UL
METAMYELOCYTES NFR BLD MANUAL: 1 %
MONOCYTES # BLD AUTO: 0.2 THOU/UL (ref 0–0.9)
MONOCYTES NFR BLD AUTO: 4 % (ref 2–10)
PLAT MORPH BLD: NORMAL
PLATELET # BLD AUTO: 200 THOU/UL (ref 140–440)
PMV BLD AUTO: 9.3 FL (ref 8.5–12.5)
POTASSIUM BLD-SCNC: 3.9 MMOL/L (ref 3.5–5)
PROT SERPL-MCNC: 6.1 G/DL (ref 6–8)
RBC # BLD AUTO: 3.15 MILL/UL (ref 4.4–6.2)
SODIUM SERPL-SCNC: 143 MMOL/L (ref 136–145)
TOTAL NEUTROPHILS-ABS(DIFF): 5.6 THOU/UL (ref 2–7.7)
TOTAL NEUTROPHILS-REL(DIFF): 90 % (ref 50–70)
WBC: 6.2 THOU/UL (ref 4–11)

## 2019-07-18 ASSESSMENT — MIFFLIN-ST. JEOR: SCORE: 1926.24

## 2019-07-19 ENCOUNTER — INFUSION - HEALTHEAST (OUTPATIENT)
Dept: INFUSION THERAPY | Facility: HOSPITAL | Age: 54
End: 2019-07-19

## 2019-07-19 DIAGNOSIS — C82.08 FOLLICULAR LYMPHOMA GRADE I, LYMPH NODES OF MULTIPLE SITES (H): ICD-10-CM

## 2019-07-19 DIAGNOSIS — Z51.11 ENCOUNTER FOR CHEMOTHERAPY MANAGEMENT: ICD-10-CM

## 2019-07-26 ENCOUNTER — COMMUNICATION - HEALTHEAST (OUTPATIENT)
Dept: ONCOLOGY | Facility: HOSPITAL | Age: 54
End: 2019-07-26

## 2019-07-30 ENCOUNTER — RECORDS - HEALTHEAST (OUTPATIENT)
Dept: ADMINISTRATIVE | Facility: OTHER | Age: 54
End: 2019-07-30

## 2019-08-08 ENCOUNTER — COMMUNICATION - HEALTHEAST (OUTPATIENT)
Dept: ONCOLOGY | Facility: HOSPITAL | Age: 54
End: 2019-08-08

## 2019-08-15 ENCOUNTER — AMBULATORY - HEALTHEAST (OUTPATIENT)
Dept: INFUSION THERAPY | Facility: HOSPITAL | Age: 54
End: 2019-08-15

## 2019-08-15 ENCOUNTER — OFFICE VISIT - HEALTHEAST (OUTPATIENT)
Dept: ONCOLOGY | Facility: HOSPITAL | Age: 54
End: 2019-08-15

## 2019-08-15 DIAGNOSIS — C82.08 FOLLICULAR LYMPHOMA GRADE I, LYMPH NODES OF MULTIPLE SITES (H): ICD-10-CM

## 2019-08-15 DIAGNOSIS — C61 PROSTATE CANCER (H): ICD-10-CM

## 2019-08-15 DIAGNOSIS — D80.6 IMMUNODEFICIENCY DISORDER DUE TO ANTIBODY DEFICIENCY (H): ICD-10-CM

## 2019-08-15 DIAGNOSIS — Z51.11 ENCOUNTER FOR CHEMOTHERAPY MANAGEMENT: ICD-10-CM

## 2019-08-15 LAB
ALBUMIN SERPL-MCNC: 4.1 G/DL (ref 3.5–5)
ALP SERPL-CCNC: 59 U/L (ref 45–120)
ALT SERPL W P-5'-P-CCNC: 29 U/L (ref 0–45)
ANION GAP SERPL CALCULATED.3IONS-SCNC: 8 MMOL/L (ref 5–18)
AST SERPL W P-5'-P-CCNC: 24 U/L (ref 0–40)
BASOPHILS # BLD AUTO: 0 THOU/UL (ref 0–0.2)
BASOPHILS NFR BLD AUTO: 1 % (ref 0–2)
BILIRUB SERPL-MCNC: 0.5 MG/DL (ref 0–1)
BUN SERPL-MCNC: 15 MG/DL (ref 8–22)
CALCIUM SERPL-MCNC: 9.5 MG/DL (ref 8.5–10.5)
CHLORIDE BLD-SCNC: 106 MMOL/L (ref 98–107)
CO2 SERPL-SCNC: 27 MMOL/L (ref 22–31)
CREAT SERPL-MCNC: 0.99 MG/DL (ref 0.7–1.3)
EOSINOPHIL # BLD AUTO: 0.1 THOU/UL (ref 0–0.4)
EOSINOPHIL NFR BLD AUTO: 1 % (ref 0–6)
ERYTHROCYTE [DISTWIDTH] IN BLOOD BY AUTOMATED COUNT: 15.8 % (ref 11–14.5)
GFR SERPL CREATININE-BSD FRML MDRD: >60 ML/MIN/1.73M2
GLUCOSE BLD-MCNC: 98 MG/DL (ref 70–125)
HCT VFR BLD AUTO: 32.7 % (ref 40–54)
HGB BLD-MCNC: 10.8 G/DL (ref 14–18)
IGA SERPL-MCNC: 10 MG/DL (ref 65–400)
IGA SERPL-MCNC: 390 MG/DL
IGM SERPL-MCNC: 7 MG/DL (ref 60–280)
LYMPHOCYTES # BLD AUTO: 0.5 THOU/UL (ref 0.8–4.4)
LYMPHOCYTES NFR BLD AUTO: 7 % (ref 20–40)
MCH RBC QN AUTO: 32.8 PG (ref 27–34)
MCHC RBC AUTO-ENTMCNC: 33 G/DL (ref 32–36)
MCV RBC AUTO: 99 FL (ref 80–100)
MONOCYTES # BLD AUTO: 0.7 THOU/UL (ref 0–0.9)
MONOCYTES NFR BLD AUTO: 11 % (ref 2–10)
NEUTROPHILS # BLD AUTO: 4.7 THOU/UL (ref 2–7.7)
NEUTROPHILS NFR BLD AUTO: 76 % (ref 50–70)
PLATELET # BLD AUTO: 161 THOU/UL (ref 140–440)
PMV BLD AUTO: 9.7 FL (ref 8.5–12.5)
POTASSIUM BLD-SCNC: 4.1 MMOL/L (ref 3.5–5)
PROT SERPL-MCNC: 6.2 G/DL (ref 6–8)
PSA SERPL-MCNC: 0.1 NG/ML (ref 0–3.5)
RBC # BLD AUTO: 3.29 MILL/UL (ref 4.4–6.2)
SODIUM SERPL-SCNC: 141 MMOL/L (ref 136–145)
WBC: 6.2 THOU/UL (ref 4–11)

## 2019-08-16 ENCOUNTER — INFUSION - HEALTHEAST (OUTPATIENT)
Dept: INFUSION THERAPY | Facility: HOSPITAL | Age: 54
End: 2019-08-16

## 2019-08-16 DIAGNOSIS — Z51.11 ENCOUNTER FOR CHEMOTHERAPY MANAGEMENT: ICD-10-CM

## 2019-08-16 DIAGNOSIS — C82.08 FOLLICULAR LYMPHOMA GRADE I, LYMPH NODES OF MULTIPLE SITES (H): ICD-10-CM

## 2019-08-24 ENCOUNTER — RECORDS - HEALTHEAST (OUTPATIENT)
Dept: ADMINISTRATIVE | Facility: OTHER | Age: 54
End: 2019-08-24

## 2019-09-11 ENCOUNTER — HOSPITAL ENCOUNTER (OUTPATIENT)
Dept: PET IMAGING | Facility: HOSPITAL | Age: 54
Setting detail: RADIATION/ONCOLOGY SERIES
Discharge: STILL ADMITTED OR ADMINISTRATIVE | End: 2019-09-11
Attending: INTERNAL MEDICINE

## 2019-09-11 DIAGNOSIS — C82.08 FOLLICULAR LYMPHOMA GRADE I, LYMPH NODES OF MULTIPLE SITES (H): ICD-10-CM

## 2019-09-11 LAB — GLUCOSE BLDC GLUCOMTR-MCNC: 97 MG/DL (ref 70–139)

## 2019-09-11 ASSESSMENT — MIFFLIN-ST. JEOR: SCORE: 1885.87

## 2019-09-13 ENCOUNTER — AMBULATORY - HEALTHEAST (OUTPATIENT)
Dept: INFUSION THERAPY | Facility: HOSPITAL | Age: 54
End: 2019-09-13

## 2019-09-13 ENCOUNTER — OFFICE VISIT - HEALTHEAST (OUTPATIENT)
Dept: ONCOLOGY | Facility: HOSPITAL | Age: 54
End: 2019-09-13

## 2019-09-13 DIAGNOSIS — C82.08 FOLLICULAR LYMPHOMA GRADE I, LYMPH NODES OF MULTIPLE SITES (H): ICD-10-CM

## 2019-09-13 DIAGNOSIS — C61 PROSTATE CANCER (H): ICD-10-CM

## 2019-09-13 LAB
ALBUMIN SERPL-MCNC: 3.9 G/DL (ref 3.5–5)
ALP SERPL-CCNC: 58 U/L (ref 45–120)
ALT SERPL W P-5'-P-CCNC: 28 U/L (ref 0–45)
ANION GAP SERPL CALCULATED.3IONS-SCNC: 9 MMOL/L (ref 5–18)
AST SERPL W P-5'-P-CCNC: 23 U/L (ref 0–40)
BASOPHILS # BLD AUTO: 0.1 THOU/UL (ref 0–0.2)
BASOPHILS NFR BLD AUTO: 1 % (ref 0–2)
BILIRUB SERPL-MCNC: 0.5 MG/DL (ref 0–1)
BUN SERPL-MCNC: 12 MG/DL (ref 8–22)
CALCIUM SERPL-MCNC: 9.3 MG/DL (ref 8.5–10.5)
CHLORIDE BLD-SCNC: 103 MMOL/L (ref 98–107)
CO2 SERPL-SCNC: 26 MMOL/L (ref 22–31)
CREAT SERPL-MCNC: 1.03 MG/DL (ref 0.7–1.3)
EOSINOPHIL COUNT (ABSOLUTE): 0.1 THOU/UL (ref 0–0.4)
EOSINOPHIL NFR BLD AUTO: 1 % (ref 0–6)
ERYTHROCYTE [DISTWIDTH] IN BLOOD BY AUTOMATED COUNT: 15 % (ref 11–14.5)
GFR SERPL CREATININE-BSD FRML MDRD: >60 ML/MIN/1.73M2
GLUCOSE BLD-MCNC: 116 MG/DL (ref 70–125)
HCT VFR BLD AUTO: 33.2 % (ref 40–54)
HGB BLD-MCNC: 11 G/DL (ref 14–18)
LDH SERPL L TO P-CCNC: 314 U/L (ref 125–220)
LYMPHOCYTES # BLD AUTO: 0.2 THOU/UL (ref 0.8–4.4)
LYMPHOCYTES NFR BLD AUTO: 3 % (ref 20–40)
MCH RBC QN AUTO: 32.8 PG (ref 27–34)
MCHC RBC AUTO-ENTMCNC: 33.1 G/DL (ref 32–36)
MCV RBC AUTO: 99 FL (ref 80–100)
MONOCYTES # BLD AUTO: 0.5 THOU/UL (ref 0–0.9)
MONOCYTES NFR BLD AUTO: 7 % (ref 2–10)
PLAT MORPH BLD: NORMAL
PLATELET # BLD AUTO: 156 THOU/UL (ref 140–440)
PMV BLD AUTO: 9.5 FL (ref 8.5–12.5)
POTASSIUM BLD-SCNC: 3.6 MMOL/L (ref 3.5–5)
PROT SERPL-MCNC: 6.1 G/DL (ref 6–8)
RBC # BLD AUTO: 3.35 MILL/UL (ref 4.4–6.2)
SODIUM SERPL-SCNC: 138 MMOL/L (ref 136–145)
TOTAL NEUTROPHILS-ABS(DIFF): 6.7 THOU/UL (ref 2–7.7)
TOTAL NEUTROPHILS-REL(DIFF): 88 % (ref 50–70)
WBC: 7.6 THOU/UL (ref 4–11)

## 2019-09-24 ENCOUNTER — RECORDS - HEALTHEAST (OUTPATIENT)
Dept: ADMINISTRATIVE | Facility: OTHER | Age: 54
End: 2019-09-24

## 2019-09-24 ENCOUNTER — RECORDS - HEALTHEAST (OUTPATIENT)
Dept: LAB | Facility: CLINIC | Age: 54
End: 2019-09-24

## 2019-09-24 LAB
BASOPHILS # BLD AUTO: 0 THOU/UL (ref 0–0.2)
BASOPHILS NFR BLD AUTO: 0 % (ref 0–2)
EOSINOPHIL COUNT (ABSOLUTE): 0.2 THOU/UL (ref 0–0.4)
EOSINOPHIL NFR BLD AUTO: 4 % (ref 0–6)
ERYTHROCYTE [DISTWIDTH] IN BLOOD BY AUTOMATED COUNT: 14.4 % (ref 11–14.5)
HCT VFR BLD AUTO: 35.4 % (ref 40–54)
HGB BLD-MCNC: 11.4 G/DL (ref 14–18)
LYMPHOCYTES # BLD AUTO: 0.4 THOU/UL (ref 0.8–4.4)
LYMPHOCYTES NFR BLD AUTO: 9 % (ref 20–40)
MCH RBC QN AUTO: 32.5 PG (ref 27–34)
MCHC RBC AUTO-ENTMCNC: 32.2 G/DL (ref 32–36)
MCV RBC AUTO: 101 FL (ref 80–100)
METAMYELOCYTES (ABSOLUTE): 0 THOU/UL
METAMYELOCYTES NFR BLD MANUAL: 1 %
MONOCYTES # BLD AUTO: 1.1 THOU/UL (ref 0–0.9)
MONOCYTES NFR BLD AUTO: 25 % (ref 2–10)
OVALOCYTES: ABNORMAL
PLAT MORPH BLD: ABNORMAL
PLATELET # BLD AUTO: 139 THOU/UL (ref 140–440)
PMV BLD AUTO: 10.3 FL (ref 8.5–12.5)
RBC # BLD AUTO: 3.51 MILL/UL (ref 4.4–6.2)
TEAR DROP: ABNORMAL
TOTAL NEUTROPHILS-ABS(DIFF): 2.8 THOU/UL (ref 2–7.7)
TOTAL NEUTROPHILS-REL(DIFF): 62 % (ref 50–70)
WBC: 4.5 THOU/UL (ref 4–11)

## 2019-09-27 ENCOUNTER — COMMUNICATION - HEALTHEAST (OUTPATIENT)
Dept: ONCOLOGY | Facility: HOSPITAL | Age: 54
End: 2019-09-27

## 2019-10-08 ENCOUNTER — COMMUNICATION - HEALTHEAST (OUTPATIENT)
Dept: ONCOLOGY | Facility: HOSPITAL | Age: 54
End: 2019-10-08

## 2019-10-08 DIAGNOSIS — C82.08 FOLLICULAR LYMPHOMA GRADE I, LYMPH NODES OF MULTIPLE SITES (H): ICD-10-CM

## 2019-10-10 ENCOUNTER — AMBULATORY - HEALTHEAST (OUTPATIENT)
Dept: ONCOLOGY | Facility: HOSPITAL | Age: 54
End: 2019-10-10

## 2019-10-17 ENCOUNTER — AMBULATORY - HEALTHEAST (OUTPATIENT)
Dept: ONCOLOGY | Facility: HOSPITAL | Age: 54
End: 2019-10-17

## 2019-10-19 ENCOUNTER — RECORDS - HEALTHEAST (OUTPATIENT)
Dept: ADMINISTRATIVE | Facility: OTHER | Age: 54
End: 2019-10-19

## 2019-10-21 ENCOUNTER — AMBULATORY - HEALTHEAST (OUTPATIENT)
Dept: ONCOLOGY | Facility: CLINIC | Age: 54
End: 2019-10-21

## 2019-10-28 ENCOUNTER — INFUSION - HEALTHEAST (OUTPATIENT)
Dept: INFUSION THERAPY | Facility: HOSPITAL | Age: 54
End: 2019-10-28

## 2019-10-28 DIAGNOSIS — D80.6 IMMUNODEFICIENCY DISORDER DUE TO ANTIBODY DEFICIENCY (H): ICD-10-CM

## 2019-10-28 DIAGNOSIS — C82.08 FOLLICULAR LYMPHOMA GRADE I, LYMPH NODES OF MULTIPLE SITES (H): ICD-10-CM

## 2019-11-04 ENCOUNTER — INFUSION - HEALTHEAST (OUTPATIENT)
Dept: INFUSION THERAPY | Facility: HOSPITAL | Age: 54
End: 2019-11-04

## 2019-11-04 DIAGNOSIS — D80.6 IMMUNODEFICIENCY DISORDER DUE TO ANTIBODY DEFICIENCY (H): ICD-10-CM

## 2019-11-04 DIAGNOSIS — C82.08 FOLLICULAR LYMPHOMA GRADE I, LYMPH NODES OF MULTIPLE SITES (H): ICD-10-CM

## 2019-11-13 ENCOUNTER — COMMUNICATION - HEALTHEAST (OUTPATIENT)
Dept: ONCOLOGY | Facility: HOSPITAL | Age: 54
End: 2019-11-13

## 2019-11-13 DIAGNOSIS — J32.9 SINUSITIS, CHRONIC: ICD-10-CM

## 2019-11-18 ENCOUNTER — OFFICE VISIT - HEALTHEAST (OUTPATIENT)
Dept: PULMONOLOGY | Facility: OTHER | Age: 54
End: 2019-11-18

## 2019-11-18 DIAGNOSIS — R05.3 CHRONIC COUGH: ICD-10-CM

## 2019-11-18 DIAGNOSIS — D80.1 HYPOGAMMAGLOBULINEMIA (H): ICD-10-CM

## 2019-11-18 DIAGNOSIS — R76.8 LOW SERUM IGG FOR AGE: ICD-10-CM

## 2019-11-18 DIAGNOSIS — D80.2 LOW SERUM IGA AND IGM LEVELS (H): ICD-10-CM

## 2019-11-18 DIAGNOSIS — D80.4 LOW SERUM IGA AND IGM LEVELS (H): ICD-10-CM

## 2019-11-18 DIAGNOSIS — D80.2 IGA DEFICIENCY (H): ICD-10-CM

## 2019-11-18 ASSESSMENT — MIFFLIN-ST. JEOR: SCORE: 1913.99

## 2019-11-20 ENCOUNTER — HOSPITAL ENCOUNTER (OUTPATIENT)
Dept: CT IMAGING | Facility: HOSPITAL | Age: 54
Discharge: HOME | End: 2019-11-20
Attending: INTERNAL MEDICINE

## 2019-11-20 DIAGNOSIS — D80.2 IGA DEFICIENCY (H): ICD-10-CM

## 2019-11-21 ENCOUNTER — COMMUNICATION - HEALTHEAST (OUTPATIENT)
Dept: PULMONOLOGY | Facility: OTHER | Age: 54
End: 2019-11-21

## 2019-11-25 ENCOUNTER — OFFICE VISIT - HEALTHEAST (OUTPATIENT)
Dept: ALLERGY | Facility: CLINIC | Age: 54
End: 2019-11-25

## 2019-11-25 DIAGNOSIS — R05.9 COUGH: ICD-10-CM

## 2019-11-25 DIAGNOSIS — J30.89 ALLERGIC RHINITIS DUE TO DUST MITE: ICD-10-CM

## 2019-11-25 DIAGNOSIS — D80.1 HYPOGAMMAGLOBULINEMIA (H): ICD-10-CM

## 2019-11-25 ASSESSMENT — MIFFLIN-ST. JEOR: SCORE: 1887

## 2019-12-02 ENCOUNTER — COMMUNICATION - HEALTHEAST (OUTPATIENT)
Dept: ALLERGY | Facility: CLINIC | Age: 54
End: 2019-12-02

## 2019-12-23 ENCOUNTER — HOSPITAL ENCOUNTER (OUTPATIENT)
Dept: RESPIRATORY THERAPY | Facility: HOSPITAL | Age: 54
Discharge: HOME | End: 2019-12-23

## 2019-12-23 DIAGNOSIS — R05.3 CHRONIC COUGH: ICD-10-CM

## 2019-12-23 LAB — HGB BLD-MCNC: 13.3 G/DL (ref 14–18)

## 2019-12-25 ENCOUNTER — COMMUNICATION - HEALTHEAST (OUTPATIENT)
Dept: PULMONOLOGY | Facility: OTHER | Age: 54
End: 2019-12-25

## 2019-12-28 ENCOUNTER — RECORDS - HEALTHEAST (OUTPATIENT)
Dept: ADMINISTRATIVE | Facility: OTHER | Age: 54
End: 2019-12-28

## 2019-12-28 ENCOUNTER — RECORDS - HEALTHEAST (OUTPATIENT)
Dept: LAB | Facility: CLINIC | Age: 54
End: 2019-12-28

## 2019-12-31 LAB — BACTERIA SPEC CULT: ABNORMAL

## 2020-01-02 ENCOUNTER — RECORDS - HEALTHEAST (OUTPATIENT)
Dept: ADMINISTRATIVE | Facility: OTHER | Age: 55
End: 2020-01-02

## 2020-01-02 ENCOUNTER — OFFICE VISIT - HEALTHEAST (OUTPATIENT)
Dept: ALLERGY | Facility: CLINIC | Age: 55
End: 2020-01-02

## 2020-01-02 DIAGNOSIS — R05.9 COUGH: ICD-10-CM

## 2020-01-02 ASSESSMENT — MIFFLIN-ST. JEOR: SCORE: 1891.54

## 2020-01-03 ENCOUNTER — COMMUNICATION - HEALTHEAST (OUTPATIENT)
Dept: ALLERGY | Facility: CLINIC | Age: 55
End: 2020-01-03

## 2020-01-06 ENCOUNTER — HOSPITAL ENCOUNTER (OUTPATIENT)
Dept: CT IMAGING | Facility: HOSPITAL | Age: 55
Discharge: HOME | End: 2020-01-06
Attending: INTERNAL MEDICINE

## 2020-01-06 DIAGNOSIS — C82.08 FOLLICULAR LYMPHOMA GRADE I, LYMPH NODES OF MULTIPLE SITES (H): ICD-10-CM

## 2020-01-06 LAB
CREAT BLD-MCNC: 1 MG/DL (ref 0.7–1.3)
GFR SERPL CREATININE-BSD FRML MDRD: >60 ML/MIN/1.73M2

## 2020-01-07 ENCOUNTER — AMBULATORY - HEALTHEAST (OUTPATIENT)
Dept: INFUSION THERAPY | Facility: HOSPITAL | Age: 55
End: 2020-01-07

## 2020-01-07 ENCOUNTER — OFFICE VISIT - HEALTHEAST (OUTPATIENT)
Dept: ONCOLOGY | Facility: HOSPITAL | Age: 55
End: 2020-01-07

## 2020-01-07 DIAGNOSIS — C61 PROSTATE CANCER (H): ICD-10-CM

## 2020-01-07 DIAGNOSIS — D80.1 HYPOGAMMAGLOBULINEMIA (H): ICD-10-CM

## 2020-01-07 DIAGNOSIS — C82.08 FOLLICULAR LYMPHOMA GRADE I, LYMPH NODES OF MULTIPLE SITES (H): ICD-10-CM

## 2020-01-07 DIAGNOSIS — N30.01 ACUTE CYSTITIS WITH HEMATURIA: ICD-10-CM

## 2020-01-07 LAB
ALBUMIN SERPL-MCNC: 4.1 G/DL (ref 3.5–5)
ALP SERPL-CCNC: 77 U/L (ref 45–120)
ALT SERPL W P-5'-P-CCNC: 63 U/L (ref 0–45)
ANION GAP SERPL CALCULATED.3IONS-SCNC: 8 MMOL/L (ref 5–18)
AST SERPL W P-5'-P-CCNC: 28 U/L (ref 0–40)
BASOPHILS # BLD AUTO: 0 THOU/UL (ref 0–0.2)
BASOPHILS NFR BLD AUTO: 0 % (ref 0–2)
BILIRUB SERPL-MCNC: 0.6 MG/DL (ref 0–1)
BUN SERPL-MCNC: 16 MG/DL (ref 8–22)
CALCIUM SERPL-MCNC: 9.5 MG/DL (ref 8.5–10.5)
CHLORIDE BLD-SCNC: 103 MMOL/L (ref 98–107)
CO2 SERPL-SCNC: 30 MMOL/L (ref 22–31)
CREAT SERPL-MCNC: 1.15 MG/DL (ref 0.7–1.3)
EOSINOPHIL # BLD AUTO: 0.1 THOU/UL (ref 0–0.4)
EOSINOPHIL NFR BLD AUTO: 1 % (ref 0–6)
ERYTHROCYTE [DISTWIDTH] IN BLOOD BY AUTOMATED COUNT: 15.4 % (ref 11–14.5)
GFR SERPL CREATININE-BSD FRML MDRD: >60 ML/MIN/1.73M2
GLUCOSE BLD-MCNC: 70 MG/DL (ref 70–125)
HCT VFR BLD AUTO: 41.6 % (ref 40–54)
HGB BLD-MCNC: 13.8 G/DL (ref 14–18)
LYMPHOCYTES # BLD AUTO: 0.7 THOU/UL (ref 0.8–4.4)
LYMPHOCYTES NFR BLD AUTO: 11 % (ref 20–40)
MCH RBC QN AUTO: 30.6 PG (ref 27–34)
MCHC RBC AUTO-ENTMCNC: 33.2 G/DL (ref 32–36)
MCV RBC AUTO: 92 FL (ref 80–100)
MONOCYTES # BLD AUTO: 0.7 THOU/UL (ref 0–0.9)
MONOCYTES NFR BLD AUTO: 11 % (ref 2–10)
NEUTROPHILS # BLD AUTO: 4.7 THOU/UL (ref 2–7.7)
NEUTROPHILS NFR BLD AUTO: 76 % (ref 50–70)
PLATELET # BLD AUTO: 155 THOU/UL (ref 140–440)
PMV BLD AUTO: 9.1 FL (ref 8.5–12.5)
POTASSIUM BLD-SCNC: 4 MMOL/L (ref 3.5–5)
PROT SERPL-MCNC: 6.9 G/DL (ref 6–8)
PSA SERPL-MCNC: 0.2 NG/ML (ref 0–3.5)
RBC # BLD AUTO: 4.51 MILL/UL (ref 4.4–6.2)
SODIUM SERPL-SCNC: 141 MMOL/L (ref 136–145)
WBC: 6.1 THOU/UL (ref 4–11)

## 2020-01-20 ENCOUNTER — COMMUNICATION - HEALTHEAST (OUTPATIENT)
Dept: ALLERGY | Facility: CLINIC | Age: 55
End: 2020-01-20

## 2020-01-23 ENCOUNTER — COMMUNICATION - HEALTHEAST (OUTPATIENT)
Dept: ALLERGY | Facility: CLINIC | Age: 55
End: 2020-01-23

## 2020-01-24 ENCOUNTER — COMMUNICATION - HEALTHEAST (OUTPATIENT)
Dept: ALLERGY | Facility: CLINIC | Age: 55
End: 2020-01-24

## 2020-01-28 ENCOUNTER — COMMUNICATION - HEALTHEAST (OUTPATIENT)
Dept: ONCOLOGY | Facility: HOSPITAL | Age: 55
End: 2020-01-28

## 2020-01-28 DIAGNOSIS — C82.08 FOLLICULAR LYMPHOMA GRADE I, LYMPH NODES OF MULTIPLE SITES (H): ICD-10-CM

## 2020-02-10 ENCOUNTER — INFUSION - HEALTHEAST (OUTPATIENT)
Dept: INFUSION THERAPY | Facility: HOSPITAL | Age: 55
End: 2020-02-10

## 2020-02-10 DIAGNOSIS — D80.1 HYPOGAMMAGLOBULINEMIA (H): ICD-10-CM

## 2020-02-10 DIAGNOSIS — D80.6 IMMUNODEFICIENCY DISORDER DUE TO ANTIBODY DEFICIENCY (H): ICD-10-CM

## 2020-02-11 ENCOUNTER — OFFICE VISIT - HEALTHEAST (OUTPATIENT)
Dept: PULMONOLOGY | Facility: OTHER | Age: 55
End: 2020-02-11

## 2020-02-11 DIAGNOSIS — R05.3 CHRONIC COUGH: ICD-10-CM

## 2020-02-11 ASSESSMENT — MIFFLIN-ST. JEOR: SCORE: 1894.15

## 2020-02-17 ENCOUNTER — RECORDS - HEALTHEAST (OUTPATIENT)
Dept: ADMINISTRATIVE | Facility: OTHER | Age: 55
End: 2020-02-17

## 2020-03-04 ENCOUNTER — OFFICE VISIT - HEALTHEAST (OUTPATIENT)
Dept: SURGERY | Facility: CLINIC | Age: 55
End: 2020-03-04

## 2020-03-04 DIAGNOSIS — K42.9 UMBILICAL HERNIA WITHOUT OBSTRUCTION AND WITHOUT GANGRENE: ICD-10-CM

## 2020-03-05 ENCOUNTER — AMBULATORY - HEALTHEAST (OUTPATIENT)
Dept: SURGERY | Facility: CLINIC | Age: 55
End: 2020-03-05

## 2020-03-09 ENCOUNTER — INFUSION - HEALTHEAST (OUTPATIENT)
Dept: INFUSION THERAPY | Facility: HOSPITAL | Age: 55
End: 2020-03-09

## 2020-03-09 ENCOUNTER — COMMUNICATION - HEALTHEAST (OUTPATIENT)
Dept: SURGERY | Facility: CLINIC | Age: 55
End: 2020-03-09

## 2020-03-09 DIAGNOSIS — D80.6 IMMUNODEFICIENCY DISORDER DUE TO ANTIBODY DEFICIENCY (H): ICD-10-CM

## 2020-03-09 DIAGNOSIS — D80.1 HYPOGAMMAGLOBULINEMIA (H): ICD-10-CM

## 2020-03-10 ENCOUNTER — COMMUNICATION - HEALTHEAST (OUTPATIENT)
Dept: ADMINISTRATIVE | Facility: CLINIC | Age: 55
End: 2020-03-10

## 2020-03-11 ENCOUNTER — AMBULATORY - HEALTHEAST (OUTPATIENT)
Dept: SURGERY | Facility: CLINIC | Age: 55
End: 2020-03-11

## 2020-03-12 ENCOUNTER — RECORDS - HEALTHEAST (OUTPATIENT)
Dept: LAB | Facility: CLINIC | Age: 55
End: 2020-03-12

## 2020-03-12 LAB
ANION GAP SERPL CALCULATED.3IONS-SCNC: 14 MMOL/L (ref 5–18)
BUN SERPL-MCNC: 13 MG/DL (ref 8–22)
CALCIUM SERPL-MCNC: 9.8 MG/DL (ref 8.5–10.5)
CHLORIDE BLD-SCNC: 99 MMOL/L (ref 98–107)
CO2 SERPL-SCNC: 26 MMOL/L (ref 22–31)
CREAT SERPL-MCNC: 1.1 MG/DL (ref 0.7–1.3)
GFR SERPL CREATININE-BSD FRML MDRD: >60 ML/MIN/1.73M2
GLUCOSE BLD-MCNC: 103 MG/DL (ref 70–125)
POTASSIUM BLD-SCNC: 3.8 MMOL/L (ref 3.5–5)
SODIUM SERPL-SCNC: 139 MMOL/L (ref 136–145)

## 2020-03-18 ENCOUNTER — COMMUNICATION - HEALTHEAST (OUTPATIENT)
Dept: SURGERY | Facility: CLINIC | Age: 55
End: 2020-03-18

## 2020-04-06 ENCOUNTER — INFUSION - HEALTHEAST (OUTPATIENT)
Dept: INFUSION THERAPY | Facility: HOSPITAL | Age: 55
End: 2020-04-06

## 2020-04-06 ENCOUNTER — HOSPITAL ENCOUNTER (OUTPATIENT)
Dept: CT IMAGING | Facility: HOSPITAL | Age: 55
Setting detail: RADIATION/ONCOLOGY SERIES
Discharge: STILL ADMITTED OR ADMINISTRATIVE | End: 2020-04-06
Attending: INTERNAL MEDICINE

## 2020-04-06 DIAGNOSIS — D80.1 HYPOGAMMAGLOBULINEMIA (H): ICD-10-CM

## 2020-04-06 DIAGNOSIS — D80.6 IMMUNODEFICIENCY DISORDER DUE TO ANTIBODY DEFICIENCY (H): ICD-10-CM

## 2020-04-06 DIAGNOSIS — C82.08 FOLLICULAR LYMPHOMA GRADE I, LYMPH NODES OF MULTIPLE SITES (H): ICD-10-CM

## 2020-04-07 ENCOUNTER — COMMUNICATION - HEALTHEAST (OUTPATIENT)
Dept: ONCOLOGY | Facility: HOSPITAL | Age: 55
End: 2020-04-07

## 2020-04-07 ENCOUNTER — OFFICE VISIT - HEALTHEAST (OUTPATIENT)
Dept: ONCOLOGY | Facility: HOSPITAL | Age: 55
End: 2020-04-07

## 2020-04-07 ENCOUNTER — AMBULATORY - HEALTHEAST (OUTPATIENT)
Dept: INFUSION THERAPY | Facility: HOSPITAL | Age: 55
End: 2020-04-07

## 2020-04-07 DIAGNOSIS — C61 PROSTATE CANCER (H): ICD-10-CM

## 2020-04-07 DIAGNOSIS — C82.08 FOLLICULAR LYMPHOMA GRADE I, LYMPH NODES OF MULTIPLE SITES (H): ICD-10-CM

## 2020-04-07 LAB
ALBUMIN SERPL-MCNC: 3.9 G/DL (ref 3.5–5)
ALP SERPL-CCNC: 66 U/L (ref 45–120)
ALT SERPL W P-5'-P-CCNC: 39 U/L (ref 0–45)
ANION GAP SERPL CALCULATED.3IONS-SCNC: 6 MMOL/L (ref 5–18)
AST SERPL W P-5'-P-CCNC: 26 U/L (ref 0–40)
BASOPHILS # BLD AUTO: 0 THOU/UL (ref 0–0.2)
BASOPHILS NFR BLD AUTO: 0 % (ref 0–2)
BILIRUB SERPL-MCNC: 0.3 MG/DL (ref 0–1)
BUN SERPL-MCNC: 13 MG/DL (ref 8–22)
CALCIUM SERPL-MCNC: 9.2 MG/DL (ref 8.5–10.5)
CHLORIDE BLD-SCNC: 105 MMOL/L (ref 98–107)
CO2 SERPL-SCNC: 29 MMOL/L (ref 22–31)
CREAT SERPL-MCNC: 1.09 MG/DL (ref 0.7–1.3)
EOSINOPHIL # BLD AUTO: 0.1 THOU/UL (ref 0–0.4)
EOSINOPHIL NFR BLD AUTO: 2 % (ref 0–6)
ERYTHROCYTE [DISTWIDTH] IN BLOOD BY AUTOMATED COUNT: 12.7 % (ref 11–14.5)
GFR SERPL CREATININE-BSD FRML MDRD: >60 ML/MIN/1.73M2
GLUCOSE BLD-MCNC: 96 MG/DL (ref 70–125)
HCT VFR BLD AUTO: 37.9 % (ref 40–54)
HGB BLD-MCNC: 12.9 G/DL (ref 14–18)
LYMPHOCYTES # BLD AUTO: 0.3 THOU/UL (ref 0.8–4.4)
LYMPHOCYTES NFR BLD AUTO: 6 % (ref 20–40)
MCH RBC QN AUTO: 32.3 PG (ref 27–34)
MCHC RBC AUTO-ENTMCNC: 34 G/DL (ref 32–36)
MCV RBC AUTO: 95 FL (ref 80–100)
MONOCYTES # BLD AUTO: 0.5 THOU/UL (ref 0–0.9)
MONOCYTES NFR BLD AUTO: 10 % (ref 2–10)
NEUTROPHILS # BLD AUTO: 4 THOU/UL (ref 2–7.7)
NEUTROPHILS NFR BLD AUTO: 81 % (ref 50–70)
PLATELET # BLD AUTO: 137 THOU/UL (ref 140–440)
PMV BLD AUTO: 9.3 FL (ref 8.5–12.5)
POTASSIUM BLD-SCNC: 4 MMOL/L (ref 3.5–5)
PROT SERPL-MCNC: 6.9 G/DL (ref 6–8)
PSA SERPL-MCNC: 0.2 NG/ML (ref 0–3.5)
RBC # BLD AUTO: 3.99 MILL/UL (ref 4.4–6.2)
SODIUM SERPL-SCNC: 140 MMOL/L (ref 136–145)
WBC: 4.9 THOU/UL (ref 4–11)

## 2020-04-08 ENCOUNTER — COMMUNICATION - HEALTHEAST (OUTPATIENT)
Dept: ONCOLOGY | Facility: HOSPITAL | Age: 55
End: 2020-04-08

## 2020-04-22 ENCOUNTER — RECORDS - HEALTHEAST (OUTPATIENT)
Dept: LAB | Facility: CLINIC | Age: 55
End: 2020-04-22

## 2020-04-25 LAB — BACTERIA SPEC CULT: ABNORMAL

## 2020-05-11 ENCOUNTER — INFUSION - HEALTHEAST (OUTPATIENT)
Dept: INFUSION THERAPY | Facility: HOSPITAL | Age: 55
End: 2020-05-11

## 2020-05-11 DIAGNOSIS — D80.1 HYPOGAMMAGLOBULINEMIA (H): ICD-10-CM

## 2020-05-11 DIAGNOSIS — D80.6 IMMUNODEFICIENCY DISORDER DUE TO ANTIBODY DEFICIENCY (H): ICD-10-CM

## 2020-05-13 ENCOUNTER — COMMUNICATION - HEALTHEAST (OUTPATIENT)
Dept: SURGERY | Facility: CLINIC | Age: 55
End: 2020-05-13

## 2020-05-15 ENCOUNTER — AMBULATORY - HEALTHEAST (OUTPATIENT)
Dept: SURGERY | Facility: CLINIC | Age: 55
End: 2020-05-15

## 2020-05-15 DIAGNOSIS — Z11.59 ENCOUNTER FOR SCREENING FOR OTHER VIRAL DISEASES: ICD-10-CM

## 2020-05-18 ENCOUNTER — HOSPITAL ENCOUNTER (OUTPATIENT)
Dept: PET IMAGING | Facility: HOSPITAL | Age: 55
Setting detail: RADIATION/ONCOLOGY SERIES
Discharge: STILL ADMITTED OR ADMINISTRATIVE | End: 2020-05-18
Attending: INTERNAL MEDICINE

## 2020-05-18 DIAGNOSIS — C82.08 FOLLICULAR LYMPHOMA GRADE I, LYMPH NODES OF MULTIPLE SITES (H): ICD-10-CM

## 2020-05-18 LAB — GLUCOSE BLDC GLUCOMTR-MCNC: 83 MG/DL (ref 70–139)

## 2020-05-18 ASSESSMENT — MIFFLIN-ST. JEOR: SCORE: 1908.55

## 2020-05-19 ENCOUNTER — OFFICE VISIT - HEALTHEAST (OUTPATIENT)
Dept: ONCOLOGY | Facility: HOSPITAL | Age: 55
End: 2020-05-19

## 2020-05-19 DIAGNOSIS — C82.52 DIFFUSE FOLLICLE CENTER LYMPHOMA OF INTRATHORACIC LYMPH NODES (H): ICD-10-CM

## 2020-05-20 ENCOUNTER — COMMUNICATION - HEALTHEAST (OUTPATIENT)
Dept: SURGERY | Facility: CLINIC | Age: 55
End: 2020-05-20

## 2020-05-20 ENCOUNTER — AMBULATORY - HEALTHEAST (OUTPATIENT)
Dept: SURGERY | Facility: CLINIC | Age: 55
End: 2020-05-20

## 2020-05-20 ASSESSMENT — MIFFLIN-ST. JEOR
SCORE: 1888.13
SCORE: 1888.13

## 2020-05-21 ENCOUNTER — AMBULATORY - HEALTHEAST (OUTPATIENT)
Dept: ONCOLOGY | Facility: HOSPITAL | Age: 55
End: 2020-05-21

## 2020-05-21 DIAGNOSIS — R59.9 ENLARGED LYMPH NODES: ICD-10-CM

## 2020-05-21 DIAGNOSIS — Z11.59 ENCOUNTER FOR SCREENING FOR OTHER VIRAL DISEASES: ICD-10-CM

## 2020-05-21 DIAGNOSIS — C82.08 FOLLICULAR LYMPHOMA GRADE I, LYMPH NODES OF MULTIPLE SITES (H): ICD-10-CM

## 2020-05-22 ENCOUNTER — COMMUNICATION - HEALTHEAST (OUTPATIENT)
Dept: ONCOLOGY | Facility: HOSPITAL | Age: 55
End: 2020-05-22

## 2020-05-26 ENCOUNTER — ANESTHESIA - HEALTHEAST (OUTPATIENT)
Dept: SURGERY | Facility: AMBULATORY SURGERY CENTER | Age: 55
End: 2020-05-26

## 2020-05-26 ENCOUNTER — AMBULATORY - HEALTHEAST (OUTPATIENT)
Dept: SURGERY | Facility: CLINIC | Age: 55
End: 2020-05-26

## 2020-05-26 ENCOUNTER — AMBULATORY - HEALTHEAST (OUTPATIENT)
Dept: FAMILY MEDICINE | Facility: CLINIC | Age: 55
End: 2020-05-26

## 2020-05-26 ENCOUNTER — COMMUNICATION - HEALTHEAST (OUTPATIENT)
Dept: SURGERY | Facility: CLINIC | Age: 55
End: 2020-05-26

## 2020-05-26 DIAGNOSIS — Z11.59 ENCOUNTER FOR SCREENING FOR OTHER VIRAL DISEASES: ICD-10-CM

## 2020-05-27 ENCOUNTER — COMMUNICATION - HEALTHEAST (OUTPATIENT)
Dept: SURGERY | Facility: CLINIC | Age: 55
End: 2020-05-27

## 2020-05-28 ENCOUNTER — HOSPITAL ENCOUNTER (OUTPATIENT)
Dept: SURGERY | Facility: AMBULATORY SURGERY CENTER | Age: 55
Discharge: HOME | End: 2020-05-28
Attending: SURGERY | Admitting: SURGERY
Payer: COMMERCIAL

## 2020-05-28 ENCOUNTER — SURGERY - HEALTHEAST (OUTPATIENT)
Dept: SURGERY | Facility: AMBULATORY SURGERY CENTER | Age: 55
End: 2020-05-28

## 2020-05-28 ENCOUNTER — AMBULATORY - HEALTHEAST (OUTPATIENT)
Dept: SURGERY | Facility: CLINIC | Age: 55
End: 2020-05-28

## 2020-05-28 DIAGNOSIS — Z85.72 HX OF LYMPHOMA: ICD-10-CM

## 2020-05-28 DIAGNOSIS — K42.9 UMBILICAL HERNIA WITHOUT OBSTRUCTION AND WITHOUT GANGRENE: ICD-10-CM

## 2020-05-28 DIAGNOSIS — C82.08 FOLLICULAR LYMPHOMA GRADE I, LYMPH NODES OF MULTIPLE SITES (H): ICD-10-CM

## 2020-05-28 ASSESSMENT — MIFFLIN-ST. JEOR
SCORE: 1888.13
SCORE: 1888.13

## 2020-06-01 ENCOUNTER — RECORDS - HEALTHEAST (OUTPATIENT)
Dept: ADMINISTRATIVE | Facility: OTHER | Age: 55
End: 2020-06-01

## 2020-06-02 ENCOUNTER — RECORDS - HEALTHEAST (OUTPATIENT)
Dept: ADMINISTRATIVE | Facility: OTHER | Age: 55
End: 2020-06-02

## 2020-06-02 ENCOUNTER — COMMUNICATION - HEALTHEAST (OUTPATIENT)
Dept: ONCOLOGY | Facility: HOSPITAL | Age: 55
End: 2020-06-02

## 2020-06-02 ENCOUNTER — AMBULATORY - HEALTHEAST (OUTPATIENT)
Dept: ONCOLOGY | Facility: HOSPITAL | Age: 55
End: 2020-06-02

## 2020-06-04 ENCOUNTER — OFFICE VISIT - HEALTHEAST (OUTPATIENT)
Dept: SURGERY | Facility: CLINIC | Age: 55
End: 2020-06-04

## 2020-06-04 ENCOUNTER — COMMUNICATION - HEALTHEAST (OUTPATIENT)
Dept: SURGERY | Facility: CLINIC | Age: 55
End: 2020-06-04

## 2020-06-04 DIAGNOSIS — K42.9 UMBILICAL HERNIA WITHOUT OBSTRUCTION AND WITHOUT GANGRENE: ICD-10-CM

## 2020-06-05 ENCOUNTER — COMMUNICATION - HEALTHEAST (OUTPATIENT)
Dept: SURGERY | Facility: CLINIC | Age: 55
End: 2020-06-05

## 2020-06-05 ENCOUNTER — COMMUNICATION - HEALTHEAST (OUTPATIENT)
Dept: ONCOLOGY | Facility: HOSPITAL | Age: 55
End: 2020-06-05

## 2020-06-08 ENCOUNTER — INFUSION - HEALTHEAST (OUTPATIENT)
Dept: INFUSION THERAPY | Facility: HOSPITAL | Age: 55
End: 2020-06-08

## 2020-06-08 DIAGNOSIS — D80.6 IMMUNODEFICIENCY DISORDER DUE TO ANTIBODY DEFICIENCY (H): ICD-10-CM

## 2020-06-08 DIAGNOSIS — D80.1 HYPOGAMMAGLOBULINEMIA (H): ICD-10-CM

## 2020-06-09 ENCOUNTER — OFFICE VISIT - HEALTHEAST (OUTPATIENT)
Dept: ONCOLOGY | Facility: HOSPITAL | Age: 55
End: 2020-06-09

## 2020-06-09 DIAGNOSIS — C82.08 FOLLICULAR LYMPHOMA GRADE I, LYMPH NODES OF MULTIPLE SITES (H): ICD-10-CM

## 2020-06-10 ENCOUNTER — COMMUNICATION - HEALTHEAST (OUTPATIENT)
Dept: ONCOLOGY | Facility: HOSPITAL | Age: 55
End: 2020-06-10

## 2020-06-15 ENCOUNTER — COMMUNICATION - HEALTHEAST (OUTPATIENT)
Dept: ADMINISTRATIVE | Facility: HOSPITAL | Age: 55
End: 2020-06-15

## 2020-06-18 ENCOUNTER — MEDICAL CORRESPONDENCE (OUTPATIENT)
Dept: TRANSPLANT | Facility: CLINIC | Age: 55
End: 2020-06-18

## 2020-06-18 ENCOUNTER — VIRTUAL VISIT (OUTPATIENT)
Dept: TRANSPLANT | Facility: CLINIC | Age: 55
End: 2020-06-18
Attending: INTERNAL MEDICINE
Payer: COMMERCIAL

## 2020-06-18 ENCOUNTER — RECORDS - HEALTHEAST (OUTPATIENT)
Dept: ADMINISTRATIVE | Facility: OTHER | Age: 55
End: 2020-06-18

## 2020-06-18 DIAGNOSIS — C82.83 OTHER TYPE OF FOLLICULAR LYMPHOMA OF INTRA-ABDOMINAL LYMPH NODE (H): Primary | ICD-10-CM

## 2020-06-18 DIAGNOSIS — C85.90 NHL (NON-HODGKIN'S LYMPHOMA) (H): Primary | ICD-10-CM

## 2020-06-18 DIAGNOSIS — Z71.9 VISIT FOR COUNSELING: Primary | ICD-10-CM

## 2020-06-18 RX ORDER — PSYLLIUM HUSK 3.4 G/5.8G
1 POWDER ORAL DAILY
COMMUNITY
End: 2021-12-30

## 2020-06-18 RX ORDER — AZELASTINE 1 MG/ML
1 SPRAY, METERED NASAL PRN
Status: ON HOLD | COMMUNITY
Start: 2019-04-15 | End: 2020-09-04

## 2020-06-18 RX ORDER — MULTIVITAMIN WITH FOLIC ACID 400 MCG
TABLET ORAL
Status: ON HOLD | COMMUNITY
End: 2020-09-04

## 2020-06-18 RX ORDER — NAPROXEN 500 MG/1
TABLET ORAL PRN
Status: ON HOLD | COMMUNITY
End: 2020-09-04

## 2020-06-18 RX ORDER — SILDENAFIL 25 MG/1
25 TABLET, FILM COATED ORAL
Status: ON HOLD | COMMUNITY
Start: 2020-06-18 | End: 2020-09-04

## 2020-06-18 RX ORDER — TOLTERODINE 4 MG/1
4 CAPSULE, EXTENDED RELEASE ORAL DAILY
Status: ON HOLD | COMMUNITY
Start: 2019-08-15 | End: 2020-10-05

## 2020-06-18 RX ORDER — FLUTICASONE PROPIONATE 50 MCG
SPRAY, SUSPENSION (ML) NASAL
Status: ON HOLD | COMMUNITY
Start: 2019-06-01 | End: 2020-09-04

## 2020-06-18 RX ORDER — ALBUTEROL SULFATE 90 UG/1
2 AEROSOL, METERED RESPIRATORY (INHALATION) EVERY 6 HOURS PRN
Status: ON HOLD | COMMUNITY
End: 2020-09-04

## 2020-06-18 SDOH — HEALTH STABILITY: MENTAL HEALTH: HOW OFTEN DO YOU HAVE A DRINK CONTAINING ALCOHOL?: 4 OR MORE TIMES A WEEK

## 2020-06-18 SDOH — HEALTH STABILITY: MENTAL HEALTH: HOW MANY STANDARD DRINKS CONTAINING ALCOHOL DO YOU HAVE ON A TYPICAL DAY?: 3 OR 4

## 2020-06-18 ASSESSMENT — PAIN SCALES - GENERAL: PAINLEVEL: NO PAIN (0)

## 2020-06-18 NOTE — PROGRESS NOTES
"Juvenal Blake is a 54 year old male who is being evaluated via a billable video visit.      The patient has been notified of following:     \"This video visit will be conducted via a call between you and your physician/provider. We have found that certain health care needs can be provided without the need for an in-person physical exam.  This service lets us provide the care you need with a video conversation.  If a prescription is necessary we can send it directly to your pharmacy.  If lab work is needed we can place an order for that and you can then stop by our lab to have the test done at a later time.    Video visits are billed at different rates depending on your insurance coverage.  Please reach out to your insurance provider with any questions.    If during the course of the call the physician/provider feels a video visit is not appropriate, you will not be charged for this service.\"    Patient has given verbal consent for Video visit? Yes    Will anyone else be joining your video visit? No      Mustapha Kemp LPN    Video-Visit Details    Type of service:  Video Visit    Video Start Time: 1:01 PM  Video End Time:1:49 PM    [place] [facility] [place]   [facility] [employer] [facility]     Platform used for Video Visit:[employer]    REQUESTING PHYSICIAN:  Dr. Rob Crooks.      REASON FOR CONSULTATION:  I today saw Juvenal Blake for consultation regarding possible transplant or cellular therapy for multiply relapsed follicular lymphoma.        HISTORY OF CANCER:  He was originally diagnosed in 2010 with a grade I-II follicular lymphoma.  He was stage IV at diagnosis with marrow involvement.  He was treated with bendamustine and Rituxan and achieved a CR.  He then unfortunately relapsed in 12/2016 with a low-grade follicular lymphoma again.  He was again treated with Rituxan and bendamustine and again had achieved a complete remission.  He had a second " relapse then in 01/2019 for which he was treated with O-CHOP and again achieved a CR.  Imaging in 04/2020 showed adenopathy above and below the diaphragm.  He had an axillary node biopsied and this again showed a grade I-II follicular lymphoma which expressed CD19 and CD20.  The Ki-67 was 20%.  Pending are studies for double-hit lymphoma.  He is now referred for consultation regarding further therapy and discussion of options, including transplant as well as potential cellular therapies.      PAST MEDICAL HISTORY:  Remarkable for [place] 7 high-risk prostate cancer with invasion of the capsule but negative nodes, for which he was treated with prostatectomy in 2017 and then subsequent radiation.  His most recent PSA was low, and he has had no recurrent symptoms.  Interestingly, he also has a history of hypogammaglobulinemia and has received intermittent doses of IVIG over the last year and a half or so.      REVIEW OF SYSTEMS:  He currently has no fevers, night sweats or weight loss.  No nausea, vomiting, diarrhea, cough, hemoptysis, shortness of breath, hematuria, dysuria, bruising or bleeding.  He continues working full-time as a  in the [employer] system without problem.  He has no pain whatsoever.  The remainder of the 10-point review of systems is negative.      PHYSICAL EXAMINATION:  His general appearance was normal.  There was no erythema or discharge from his eyes.  He had no cough or labored breathing.  His skin looked of normal coloration and I did not see any lesions.  There were no tremors or headaches.  He was not anxious.  He was alert and oriented.  The remainder of the physical examination could not be fully performed due to this being a video conference.      SOCIAL HISTORY:  He is a  at [employer], lives with his wife on the [place].  Because of the COVID epidemic, he has 4 children of his own from other marriages at home with him.  There are 2 boys of which  he is not a parent.  There is 1 son that he is apparent and his current wife is the mother of and another son that he is the father of and someone else is the mother.  The boys range from 26 to 37 years old and are all in good health.  Mr. Blake also has a brother and 2 sisters between the age of 57 to 64, all of whom are in good health as well.      ALLERGIES:  He becomes anxious and has severe itching when he gets Zofran.      FAMILY HISTORY:  Interestingly, is positive for his father also having had and  from follicular lymphoma in his 70s.  Mr. Blake does not remember whether he had hypogammaglobulinemia as well.      ASSESSMENT AND PLAN:  I spent a total of 48 minutes face-to-face with the patient and a total of 75 minutes today for this visit, the majority of the visit was in face-to-face consultation with the patient.  Possible therapies that were discussed included NAM-NK experimental therapy, which is cellular.  I also discussed autologous and allogeneic transplantation.  In view of his rather prompt relapse after his last cycle of chemotherapy and extensive pre-treatment history, I do not believe an autologous transplant, even if some sort of salvage therapy could be successfully undertaken, would produce a very durable remission.  Also, because of his age and having had the prostatic cancer, his comorbidity index is 3, so it would a high risk.  Historically, allogeneic transplants for follicular lymphoma have an approximately 25%-35% mortality.  I therefore primarily discussed the haplo-NK possibility.  We have done approximately 10 patients with follicular lymphoma on this protocol, and there is approximately 70% survival and a few longer term survivors.  This therapy involves collecting donor cells from a haploidentical donor.  His most likely donors would be his sons, since they are younger.  The 2 sons of whom he is the father should be typed, as well as his siblings, to look at all  possibilities.  Once a suitable donor is identified, he would then come here for an approximately 5-day outpatient evaluation, during the course of which all major organ systems would be assessed and, as appropriate, consultations and interventions undertaken.  The actual treatment protocol lasts about 3 weeks.  On day -14, the donor's peripheral blood mononuclear cells would be collected and NK cells isolated and expanded ex vivo within the laboratory with the addition of interleukin-2 for approximately 2 weeks.  On day -10, he would receive Rituxan.  On day -5, he would receive cyclophosphamide and fludarabine outpatient for 3 consecutive days, with another infusion of rituximab on day -3.  On day 0, he would be admitted to the hospital for infusion of the first dose of haplo-NK, he would also receive a subcutaneous injection of interleukin-2.  On day +2, he would certainly be readmitted for overnight for the infusion of the second aliquot and interleukin-2.  A third interleukin-2 will be done at day +4, and rituximab will be given on day +11.  At that point, he would simply be followed.  As mentioned, there is a 70% response with this protocol.  Followup is quite early, but there are a few longer term survivors.  I believe this offers the best balance between risk and benefit for him in the current situation.  I would recommend that he have a bone marrow aspirate and biopsy performed, as this is part of the restaging of the follicular lymphoma.  The double-hit FISH analysis also needs to be completed because if there is a double-hit lymphoma here, which seems unlikely given the morphology but it is still possible, then some more aggressive intervention such as CAR T might have to be entertained, as this would be more of a transformative event.      Mr. Blake asked several probative questions, which I answered to the best of my capacity.  I am left with the impression that he has a good understanding of the  potential risks and benefits of the outlined experimental cellular therapy and is willing to proceed.  I will make arrangements for the patient and his family members to be HLA tested, and we will proceed as soon as is feasible.  He currently does not need any therapy, fortunately, because he is asymptomatic.      Finally, I would like to thank Dr. Crooks for referring this most interesting and challenging patient.      Gage Corarl MD

## 2020-06-18 NOTE — LETTER
June 23, 2020       TO: Juvenal Blake  [address] [place] [address]       DearMr.Hayden,    We are writing to inform you of your test results.    {ump results letter list:[id number]}    No results found from the In Basket message.    ***

## 2020-06-18 NOTE — LETTER
6/18/2020         RE: Juvenal Blake  [address] [place] [address]        Dear Colleague,    Thank you for referring your patient, Juvenal Blake, to the [facility] B[facility]OD AND MARROW TRANSPLANT. Please see a copy of my visit note below.    Met with Juvenal via telephone after their consultation with Dr. Corral. Explained the role of a BMT Nurse Coordinator. Reviewed the plan as outlined by Dr. Corral, the steps followed through work-up week, as well as answered questions pertaining to the transplant process. Patient was provided with contact information for BMT office, as well as for Dr. Corral. HLA typing to be collected tomorrow afternoon. Provided directions and instructions on lab appointment. Encouraged Juvenal to call BMT office with information from siblings/children for potential haplo donor. Juvenal had no further questions, comments, or concerns at this time.    Again, thank you for allowing me to participate in the care of your patient.        Sincerely,        BMT Nurse Coordinator

## 2020-06-18 NOTE — PROGRESS NOTES
Met with Juvenal via telephone after their consultation with Dr. Corral. Explained the role of a BMT Nurse Coordinator. Reviewed the plan as outlined by Dr. Corral, the steps followed through work-up week, as well as answered questions pertaining to the transplant process. Patient was provided with contact information for BMT office, as well as for Dr. Corral. HLA typing to be collected tomorrow afternoon. Provided directions and instructions on lab appointment. Encouraged Juvenal to call BMT office with information from siblings/children for potential haplo donor. Juvenal had no further questions, comments, or concerns at this time.

## 2020-06-18 NOTE — PROGRESS NOTES
BMT Clinical Social Work New Transplant Evaluation    Information for this evaluation on 2020 was collected via Pt report, consultation with medical team, and medical chart review.     Present:  Patient: Juvenal Blake  Clinical  (CSW): CITLALLI Rosario, [facility]    Medical Team:   Nurse Coordinator: Hoang Stoner RN  BMT Physician: Gage Corral MD  Referring Physician: Rob Crooks MD    Diagnosis: non-Hodgkin's Lymphoma (NHL)      Presenting Information:   Pt is a 54 year old male diagnosed with NHL who presents to [facility] for consultation regarding an NAM-NK stem cell transplant. Today's visit was by telephone due to COVID-19 restrictions.      Contact Information:  Pt Phone: [phone]  Pt Email: [email or web address]  Pt's wife María Phone: [phone]    Special Needs:  No needs identified at this time.   The pt lives in [place], [place] and will not need to relocate    Family Constellation:   Spouse: Nancy Blake  Children: 2 biological son and 2 step sons  Parents:   Siblings:  3 siblings- 1 brother and 2 sisters    Education/Employment:  The pt currently works as a  for the [employer], the pt's wife also works for the [employer] as well. He is uncertain if he has long or short term disability options, but will check into this.    Finances/Insurance:  No financial or insurance concerns identified at this time. The pt is currently still working and collecting a paycheck.     CSW provided information regarding the insurance authorization process and the role of the BMT financial case-mangers. CSW provided contact info for the BMT financial case-managers and referred pt to them for future insurance questions.     Caregiver:  CSW discussed with Pt the caregiver role and expectation at length. Pt is agreeable to having a full time caregiver for a minimum of 30 days until cleared by the BMT physician. Pt's  identified caregivers are his wife and/or sons. Caregiver education and information provided. No caregiver concerns identified.     Healthcare Directive:  No. CSW provided education and forms. CSW encouraged Pt to have discussions with their family regarding their health care wishes.     Resources Provided:  BMT Information Book   BMT Resources Packet:   Healthcare Directive:   Tour of Unit NO   Transplant unit description and information provided.     Identified Concerns:   No concerns identified at this time.     Summary:   Pt presents to [facility] for consultation regarding an NAM NK stem cell transplant. Pt/family asked good questions regarding psychosocial factors related to BMT; all of which were answered. Pt presented as friendly, but a bit overwhelmed.     Plan:   CSW provided contact information and encouraged Pt to contact CSW with questions, concerns, resources and for support. CSW will continue to follow Pt to provide supportive counseling and assistance with resources as needed.      CITLALLI Rosario, [facility]  Pager [phone]  Phone [phone]

## 2020-06-18 NOTE — LETTER
"    6/18/2020         RE: Juvenal Blake  [address] [place] [address]      Juvenal Blake is a 54 year old male who is being evaluated via a billable video visit.      The patient has been notified of following:     \"This video visit will be conducted via a call between you and your physician/provider. We have found that certain health care needs can be provided without the need for an in-person physical exam.  This service lets us provide the care you need with a video conversation.  If a prescription is necessary we can send it directly to your pharmacy.  If lab work is needed we can place an order for that and you can then stop by our lab to have the test done at a later time.    Video visits are billed at different rates depending on your insurance coverage.  Please reach out to your insurance provider with any questions.    If during the course of the call the physician/provider feels a video visit is not appropriate, you will not be charged for this service.\"    Patient has given verbal consent for Video visit? Yes    Will anyone else be joining your video visit? No      Mustapha Kemp LPN    Video-Visit Details    Type of service:  Video Visit    Video Start Time: 1:01 PM  Video End Time:1:49 PM    Originating Location (pt. Location): [place] Location (provider location):   [employer] BLOOD AND MARROW TRANSPL[facility]     Platform used for Video Visit:[employer]    REQUESTING PHYSICIAN:  Dr. Rob Crooks.      REASON FOR CONSULTATION:  I today saw Juvenal Blake for consultation regarding possible transplant or cellular therapy for multiply relapsed follicular lymphoma.        HISTORY OF CANCER:  He was originally diagnosed in 2010 with a grade I-II follicular lymphoma.  He was stage IV at diagnosis with marrow involvement.  He was treated with bendamustine and Rituxan and achieved a CR.  He then unfortunately relapsed in 12/2016 with a low-grade follicular lymphoma again.  He was again treated with " Rituxan and bendamustine and again had achieved a complete remission.  He had a second relapse then in 01/2019 for which he was treated with O-CHOP and again achieved a CR.  Imaging in 04/2020 showed adenopathy above and below the diaphragm.  He had an axillary node biopsied and this again showed a grade I-II follicular lymphoma which expressed CD19 and CD20.  The Ki-67 was 20%.  Pending are studies for double-hit lymphoma.  He is now referred for consultation regarding further therapy and discussion of options, including transplant as well as potential cellular therapies.      PAST MEDICAL HISTORY:  Remarkable for [place] 7 high-risk prostate cancer with invasion of the capsule but negative nodes, for which he was treated with prostatectomy in 2017 and then subsequent radiation.  His most recent PSA was low, and he has had no recurrent symptoms.  Interestingly, he also has a history of hypogammaglobulinemia and has received intermittent doses of IVIG over the last year and a half or so.      REVIEW OF SYSTEMS:  He currently has no fevers, night sweats or weight loss.  No nausea, vomiting, diarrhea, cough, hemoptysis, shortness of breath, hematuria, dysuria, bruising or bleeding.  He continues working full-time as a  in the [employer] system without problem.  He has no pain whatsoever.  The remainder of the 10-point review of systems is negative.      PHYSICAL EXAMINATION:  His general appearance was normal.  There was no erythema or discharge from his eyes.  He had no cough or labored breathing.  His skin looked of normal coloration and I did not see any lesions.  There were no tremors or headaches.  He was not anxious.  He was alert and oriented.  The remainder of the physical examination could not be fully performed due to this being a video conference.      SOCIAL HISTORY:  He is a  at [employer], lives with his wife on the [place].  Because of the COVID epidemic, he has 4  children of his own from other marriages at home with him.  There are 2 boys of which he is not a parent.  There is 1 son that he is apparent and his current wife is the mother of and another son that he is the father of and someone else is the mother.  The boys range from 26 to 37 years old and are all in good health.  Mr. Blake also has a brother and 2 sisters between the age of 57 to 64, all of whom are in good health as well.      ALLERGIES:  He becomes anxious and has severe itching when he gets Zofran.      FAMILY HISTORY:  Interestingly, is positive for his father also having had and  from follicular lymphoma in his 70s.  Mr. Blake does not remember whether he had hypogammaglobulinemia as well.      ASSESSMENT AND PLAN:  I spent a total of 48 minutes face-to-face with the patient and a total of 75 minutes today for this visit, the majority of the visit was in face-to-face consultation with the patient.  Possible therapies that were discussed included NAM-NK experimental therapy, which is cellular.  I also discussed autologous and allogeneic transplantation.  In view of his rather prompt relapse after his last cycle of chemotherapy and extensive pre-treatment history, I do not believe an autologous transplant, even if some sort of salvage therapy could be successfully undertaken, would produce a very durable remission.  Also, because of his age and having had the prostatic cancer, his comorbidity index is 3, so it would a high risk.  Historically, allogeneic transplants for follicular lymphoma have an approximately 25%-35% mortality.  I therefore primarily discussed the haplo-NK possibility.  We have done approximately 10 patients with follicular lymphoma on this protocol, and there is approximately 70% survival and a few longer term survivors.  This therapy involves collecting donor cells from a haploidentical donor.  His most likely donors would be his sons, since they are younger.  The 2 sons of  whom he is the father should be typed, as well as his siblings, to look at all possibilities.  Once a suitable donor is identified, he would then come here for an approximately 5-day outpatient evaluation, during the course of which all major organ systems would be assessed and, as appropriate, consultations and interventions undertaken.  The actual treatment protocol lasts about 3 weeks.  On day -14, the donor's peripheral blood mononuclear cells would be collected and NK cells isolated and expanded ex vivo within the laboratory with the addition of interleukin-2 for approximately 2 weeks.  On day -10, he would receive Rituxan.  On day -5, he would receive cyclophosphamide and fludarabine outpatient for 3 consecutive days, with another infusion of rituximab on day -3.  On day 0, he would be admitted to the hospital for infusion of the first dose of haplo-NK, he would also receive a subcutaneous injection of interleukin-2.  On day +2, he would certainly be readmitted for overnight for the infusion of the second aliquot and interleukin-2.  A third interleukin-2 will be done at day +4, and rituximab will be given on day +11.  At that point, he would simply be followed.  As mentioned, there is a 70% response with this protocol.  Followup is quite early, but there are a few longer term survivors.  I believe this offers the best balance between risk and benefit for him in the current situation.  I would recommend that he have a bone marrow aspirate and biopsy performed, as this is part of the restaging of the follicular lymphoma.  The double-hit FISH analysis also needs to be completed because if there is a double-hit lymphoma here, which seems unlikely given the morphology but it is still possible, then some more aggressive intervention such as CAR T might have to be entertained, as this would be more of a transformative event.      Mr. Blake asked several probative questions, which I answered to the best of my  capacity.  I am left with the impression that he has a good understanding of the potential risks and benefits of the outlined experimental cellular therapy and is willing to proceed.  I will make arrangements for the patient and his family members to be HLA tested, and we will proceed as soon as is feasible.  He currently does not need any therapy, fortunately, because he is asymptomatic.      Finally, I would like to thank Dr. Crooks for referring this most interesting and challenging patient.      Gage Corral MD

## 2020-06-18 NOTE — LETTER
2020         RE: Juvenal Blake  [address] [place] [address]        Dear Colleague,    Thank you for referring your patient, Juvenal Blake, to the [facility] BLOOD AND MARROW TRANSPLANT. Please see a copy of my visit note below.    BMT Clinical Social Work New Transplant Evaluation    Information for this evaluation on 2020 was collected via Pt report, consultation with medical team, and medical chart review.     Present:  Patient: Juvenal Blake  Clinical  (CSW): CITLALLI Rosario, [facility]    Medical Team:   Nurse Coordinator: Hoang Stoner RN  BMT Physician: Gage Corral MD  Referring Physician: Rob Crooks MD    Diagnosis: non-Hodgkin's Lymphoma (NHL)      Presenting Information:   Pt is a 54 year old male diagnosed with NHL who presents to [facility] for consultation regarding an NAM-NK stem cell transplant. Today's visit was by telephone due to COVID-[employer] restrictions.      Contact Information:  Pt Phone: [phone]  Pt Email: [email or web address]  Pt's wife Santas Phone: [phone]    Special Needs:  No needs identified at this time.   The pt lives in [place], [place] and will not need to relocate    Family Constellation:   Spouse: Nancy Blake  Children: 2 biological son and 2 step sons  Parents:   Siblings:  3 siblings- 1 brother and 2 sisters    Education/Employment:  The pt currently works as a  for the [employer] district, the pt's wife also works for the [employer] district as well. He is uncertain if he has long or short term disability options, but will check into this.    Finances/Insurance:  No financial or insurance concerns identified at this time. The pt is currently still working and collecting a paycheck.     CSW provided information regarding the insurance authorization process and the role of the BMT financial case-mangers. CSW provided contact info for the BMT financial case-managers  and referred pt to them for future insurance questions.     Caregiver:  CSW discussed with Pt the caregiver role and expectation at length. Pt is agreeable to having a full time caregiver for a minimum of 30 days until cleared by the BMT physician. Pt's identified caregivers are his wife and/or sons. Caregiver education and information provided. No caregiver concerns identified.     Healthcare Directive:  No. CSW provided education and forms. CSW encouraged Pt to have discussions with their family regarding their health care wishes.     Resources Provided:  BMT Information Book   BMT Resources Packet:   Healthcare Directive:   Tour of Unit NO   Transplant unit description and information provided.     Identified Concerns:   No concerns identified at this time.     Summary:   Pt presents to [facility] for consultation regarding an NAM NK stem cell transplant. Pt/family asked good questions regarding psychosocial factors related to BMT; all of which were answered. Pt presented as friendly, but a bit overwhelmed.     Plan:   CSW provided contact information and encouraged Pt to contact CSW with questions, concerns, resources and for support. CSW will continue to follow Pt to provide supportive counseling and assistance with resources as needed.      CITLALLI Rosario, LICSW  Pager [phone]  Phone [phone]      Again, thank you for allowing me to participate in the care of your patient.        Sincerely,        Claudine Coronado, FELTON

## 2020-06-18 NOTE — LETTER
"    6/18/2020         RE: Juvenal Blake  [address] [place] [address]        Dear Colleague,    Thank you for referring your patient, Juvenal Blake, to the [facility] BLOOD AND MARROW TRANSPLANT. Please see a copy of my visit note below.    Juvenal Blake is a 54 year old male who is being evaluated via a billable video visit.      The patient has been notified of following:     \"This video visit will be conducted via a call between you and your physician/provider. We have found that certain health care needs can be provided without the need for an in-person physical exam.  This service lets us provide the care you need with a video conversation.  If a prescription is necessary we can send it directly to your pharmacy.  If lab work is needed we can place an order for that and you can then stop by our lab to have the test done at a later time.    Video visits are billed at different rates depending on your insurance coverage.  Please reach out to your insurance provider with any questions.    If during the course of the call the physician/provider feels a video visit is not appropriate, you will not be charged for this service.\"    Patient has given verbal consent for Video visit? Yes    Will anyone else be joining your video visit? No      Mustapha Kemp LPN    Video-Visit Details    Type of service:  Video Visit    Video Start Time: 1:01 PM  Video End Time:1:49 PM    Originating Location (pt. Location): homeDistant Location (provider location):  [facility] BLOOD AND MARROW TRANSPLANT     Platform used for Video Visit:Tidal    REQUESTING PHYSICIAN:  Dr. Rob Crooks.      REASON FOR CONSULTATION:  I today saw Juvenal Blake for consultation regarding possible transplant or cellular therapy for multiply relapsed follicular lymphoma.        HISTORY OF CANCER:  He was originally diagnosed in 2010 with a grade I-II follicular lymphoma.  He was stage IV at diagnosis with marrow involvement.  He was " treated with bendamustine and Rituxan and achieved a CR.  He then unfortunately relapsed in 12/2016 with a low-grade follicular lymphoma again.  He was again treated with Rituxan and bendamustine and again had achieved a complete remission.  He had a second relapse then in 01/2019 for which he was treated with O-CHOP and again achieved a CR.  Imaging in 04/2020 showed adenopathy above and below the diaphragm.  He had an axillary node biopsied and this again showed a grade I-II follicular lymphoma which expressed CD19 and CD20.  The Ki-67 was 20%.  Pending are studies for double-hit lymphoma.  He is now referred for consultation regarding further therapy and discussion of options, including transplant as well as potential cellular therapies.      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  His most recent PSA was low, and he has had no recurrent symptoms.  Interestingly, he also has a history of hypogammaglobulinemia and has received intermittent doses of IVIG over the last year and a half or so.      REVIEW OF SYSTEMS:  He currently has no fevers, night sweats or weight loss.  No nausea, vomiting, diarrhea, cough, hemoptysis, shortness of breath, hematuria, dysuria, bruising or bleeding.  He continues working full-time as a  in the [employer] system without problem.  He has no pain whatsoever.  The remainder of the 10-point review of systems is negative.      PHYSICAL EXAMINATION:  His general appearance was normal.  There was no erythema or discharge from his eyes.  He had no cough or labored breathing.  His skin looked of normal coloration and I did not see any lesions.  There were no tremors or headaches.  He was not anxious.  He was alert and oriented.  The remainder of the physical examination could not be fully performed due to this being  a video conference.      SOCIAL HISTORY:  He is a  at [employer], lives with his wife on the [place].  Because of the COVID epidemic, he has 4 children of his own from other marriages at home with him.  There are 2 boys of which he is not a parent.  There is 1 son that he is apparent and his current wife is the mother of and another son that he is the father of and someone else is the mother.  The boys range from 26 to 37 years old and are all in good health.  Mr. Blake also has a brother and 2 sisters between the age of 57 to 64, all of whom are in good health as well.      ALLERGIES:  He becomes anxious and has severe itching when he gets Zofran.      FAMILY HISTORY:  Interestingly, is positive for his father also having had and  from follicular lymphoma in his 70s.  Mr. Blake does not remember whether he had hypogammaglobulinemia as well.      ASSESSMENT AND PLAN:  I spent a total of 48 minutes face-to-face with the patient and a total of 75 minutes today for this visit, the majority of the visit was in face-to-face consultation with the patient.  Possible therapies that were discussed included NAM-NK experimental therapy, which is cellular.  I also discussed autologous and allogeneic transplantation.  In view of his rather prompt relapse after his last cycle of chemotherapy and extensive pre-treatment history, I do not believe an autologous transplant, even if some sort of salvage therapy could be successfully undertaken, would produce a very durable remission.  Also, because of his age and having had the prostatic cancer, his comorbidity index is 3, so it would a high risk.  Historically, allogeneic transplants for follicular lymphoma have an approximately 25%-35% mortality.  I therefore primarily discussed the haplo-NK possibility.  We have done approximately 10 patients with follicular lymphoma on this protocol, and there is approximately 70% survival and a few longer term  survivors.  This therapy involves collecting donor cells from a haploidentical donor.  His most likely donors would be his sons, since they are younger.  The 2 sons of whom he is the father should be typed, as well as his siblings, to look at all possibilities.  Once a suitable donor is identified, he would then come here for an approximately 5-day outpatient evaluation, during the course of which all major organ systems would be assessed and, as appropriate, consultations and interventions undertaken.  The actual treatment protocol lasts about 3 weeks.  On day -14, the donor's peripheral blood mononuclear cells would be collected and NK cells isolated and expanded ex vivo within the laboratory with the addition of interleukin-2 for approximately 2 weeks.  On day -10, he would receive Rituxan.  On day -5, he would receive cyclophosphamide and fludarabine outpatient for 3 consecutive days, with another infusion of rituximab on day -3.  On day 0, he would be admitted to the hospital for infusion of the first dose of haplo-NK, he would also receive a subcutaneous injection of interleukin-2.  On day +2, he would certainly be readmitted for overnight for the infusion of the second aliquot and interleukin-2.  A third interleukin-2 will be done at day +4, and rituximab will be given on day +11.  At that point, he would simply be followed.  As mentioned, there is a 70% response with this protocol.  Followup is quite early, but there are a few longer term survivors.  I believe this offers the best balance between risk and benefit for him in the current situation.  I would recommend that he have a bone marrow aspirate and biopsy performed, as this is part of the restaging of the follicular lymphoma.  The double-hit FISH analysis also needs to be completed because if there is a double-hit lymphoma here, which seems unlikely given the morphology but it is still possible, then some more aggressive intervention such as CAR T  might have to be entertained, as this would be more of a transformative event.      Mr. Blake asked several probative questions, which I answered to the best of my capacity.  I am left with the impression that he has a good understanding of the potential risks and benefits of the outlined experimental cellular therapy and is willing to proceed.  I will make arrangements for the patient and his family members to be HLA tested, and we will proceed as soon as is feasible.  He currently does not need any therapy, fortunately, because he is asymptomatic.      Finally, I would like to thank Dr. Crooks for referring this most interesting and challenging patient.      Gage Corral MD           Again, thank you for allowing me to participate in the care of your patient.        Sincerely,        Gage Corral MD

## 2020-06-19 DIAGNOSIS — C85.90 NHL (NON-HODGKIN'S LYMPHOMA) (H): ICD-10-CM

## 2020-06-19 PROCEDURE — 81376 HLA II TYPING 1 LOCUS LR: CPT | Performed by: INTERNAL MEDICINE

## 2020-06-19 PROCEDURE — 81370 HLA I & II TYPING LR: CPT | Performed by: INTERNAL MEDICINE

## 2020-06-22 ENCOUNTER — DOCUMENTATION ONLY (OUTPATIENT)
Dept: ONCOLOGY | Facility: CLINIC | Age: 55
End: 2020-06-22

## 2020-06-22 NOTE — PROGRESS NOTES
Juvenal Blake's medical conditions were reviewed at the BMT Protocol Review Committee meeting on 2020    Considerations from the Committee included the following:  Follicular NHL, grade 1-2, stage IV, BR x 6 year remission.  Recurrence x 3 with shortening intervals. Non-bulky disease. History of Peggs 7 prostate CA invading capsule, no marques involvement, PSA followed and low.  Hypogamma getting IVIG replacement.  Father  of follicular NHL.  3 sibs, 2 children.    BMT Primary Protocol: NAM NK recommended.      There is no problem list on file for this patient.      Patient Care Team       Relationship Specialty Notifications Start End    Cole Sandoval MD PCP - General Family Practice  6/10/20     Phone: [phone] Fax: [phone]         [facility] [address] [place] [address]    Rob Crooks Referring Physician Hematology & Oncology  20     Phone: [phone] Fax: [phone]         [facility] CANCER [facility] [address] [facility] [address]

## 2020-06-23 ENCOUNTER — COMMUNICATION - HEALTHEAST (OUTPATIENT)
Dept: ONCOLOGY | Facility: HOSPITAL | Age: 55
End: 2020-06-23

## 2020-06-23 LAB
A* LOCUS: NORMAL
A*: NORMAL
ABTEST METHOD: NORMAL
B* LOCUS: NORMAL
B*: NORMAL
BW-1: NORMAL
C* LOCUS: NORMAL
C*: NORMAL
DPA1* NMDP: NORMAL
DPA1*: NORMAL
DPB1* LOCUS NMDP: NORMAL
DPB1* NMDP: NORMAL
DPB1*: NORMAL
DPB1*LOCUS: NORMAL
DQA1*: NORMAL
DQA1*LOCUS: NORMAL
DQB1* LOCUS: NORMAL
DQB1*: NORMAL
DRB1* LOCUS: NORMAL
DRB1*: NORMAL
DRB4* LOCUS: NORMAL
DRSSO TEST METHOD: NORMAL

## 2020-06-26 ENCOUNTER — COMMUNICATION - HEALTHEAST (OUTPATIENT)
Dept: ONCOLOGY | Facility: HOSPITAL | Age: 55
End: 2020-06-26

## 2020-06-29 ENCOUNTER — APPOINTMENT (OUTPATIENT)
Dept: LAB | Facility: CLINIC | Age: 55
End: 2020-06-29
Attending: INTERNAL MEDICINE
Payer: COMMERCIAL

## 2020-06-29 PROCEDURE — 81376 HLA II TYPING 1 LOCUS LR: CPT | Mod: XU | Performed by: INTERNAL MEDICINE

## 2020-06-29 PROCEDURE — 81370 HLA I & II TYPING LR: CPT | Performed by: INTERNAL MEDICINE

## 2020-07-01 ENCOUNTER — COMMUNICATION - HEALTHEAST (OUTPATIENT)
Dept: ONCOLOGY | Facility: HOSPITAL | Age: 55
End: 2020-07-01

## 2020-07-02 ENCOUNTER — APPOINTMENT (OUTPATIENT)
Dept: LAB | Facility: CLINIC | Age: 55
End: 2020-07-02
Attending: INTERNAL MEDICINE
Payer: COMMERCIAL

## 2020-07-02 PROCEDURE — 81370 HLA I & II TYPING LR: CPT | Performed by: INTERNAL MEDICINE

## 2020-07-02 PROCEDURE — 81376 HLA II TYPING 1 LOCUS LR: CPT | Performed by: INTERNAL MEDICINE

## 2020-07-09 DIAGNOSIS — C82.90 FOLLICULAR LYMPHOMA (H): Primary | ICD-10-CM

## 2020-07-09 DIAGNOSIS — C85.90 LYMPHOMA (H): ICD-10-CM

## 2020-07-09 DIAGNOSIS — Z86.2 PERSONAL HISTORY OF DISEASES OF BLOOD AND BLOOD-FORMING ORGANS: ICD-10-CM

## 2020-07-10 ENCOUNTER — COMMUNICATION - HEALTHEAST (OUTPATIENT)
Dept: ONCOLOGY | Facility: HOSPITAL | Age: 55
End: 2020-07-10

## 2020-07-14 ENCOUNTER — MEDICAL CORRESPONDENCE (OUTPATIENT)
Dept: TRANSPLANT | Facility: CLINIC | Age: 55
End: 2020-07-14

## 2020-07-16 ENCOUNTER — APPOINTMENT (OUTPATIENT)
Dept: LAB | Facility: CLINIC | Age: 55
End: 2020-07-16
Attending: INTERNAL MEDICINE
Payer: COMMERCIAL

## 2020-07-16 ENCOUNTER — ONCOLOGY VISIT (OUTPATIENT)
Dept: TRANSPLANT | Facility: CLINIC | Age: 55
End: 2020-07-16
Attending: PHYSICIAN ASSISTANT
Payer: COMMERCIAL

## 2020-07-16 ENCOUNTER — RECORDS - HEALTHEAST (OUTPATIENT)
Dept: ADMINISTRATIVE | Facility: OTHER | Age: 55
End: 2020-07-16

## 2020-07-16 ENCOUNTER — MEDICAL CORRESPONDENCE (OUTPATIENT)
Dept: TRANSPLANT | Facility: CLINIC | Age: 55
End: 2020-07-16

## 2020-07-16 ENCOUNTER — OFFICE VISIT (OUTPATIENT)
Dept: TRANSPLANT | Facility: CLINIC | Age: 55
End: 2020-07-16
Attending: INTERNAL MEDICINE
Payer: COMMERCIAL

## 2020-07-16 VITALS
DIASTOLIC BLOOD PRESSURE: 85 MMHG | TEMPERATURE: 97.9 F | WEIGHT: 240.7 LBS | SYSTOLIC BLOOD PRESSURE: 138 MMHG | HEART RATE: 79 BPM | OXYGEN SATURATION: 98 %

## 2020-07-16 VITALS — BODY MASS INDEX: 34.54 KG/M2 | HEIGHT: 70 IN

## 2020-07-16 DIAGNOSIS — C82.08 FOLLICULAR LYMPHOMA GRADE I, LYMPH NODES OF MULTIPLE SITES (H): ICD-10-CM

## 2020-07-16 DIAGNOSIS — C82.83 OTHER TYPE OF FOLLICULAR LYMPHOMA OF INTRA-ABDOMINAL LYMPH NODE (H): Primary | ICD-10-CM

## 2020-07-16 DIAGNOSIS — C82.90 FOLLICULAR LYMPHOMA, UNSPECIFIED FOLLICULAR LYMPHOMA TYPE, UNSPECIFIED BODY REGION (H): Primary | ICD-10-CM

## 2020-07-16 DIAGNOSIS — C82.90 FOLLICULAR LYMPHOMA (H): ICD-10-CM

## 2020-07-16 DIAGNOSIS — Z86.2 PERSONAL HISTORY OF DISEASES OF BLOOD AND BLOOD-FORMING ORGANS: ICD-10-CM

## 2020-07-16 DIAGNOSIS — C85.90 LYMPHOMA (H): ICD-10-CM

## 2020-07-16 PROBLEM — J32.9 RECURRENT SINUSITIS: Status: ACTIVE | Noted: 2020-07-16

## 2020-07-16 PROBLEM — D80.1 HYPOGAMMAGLOBULINEMIA (H): Status: ACTIVE | Noted: 2020-07-16

## 2020-07-16 PROBLEM — C61 PROSTATE CANCER (H): Status: ACTIVE | Noted: 2017-11-15

## 2020-07-16 PROBLEM — Z85.46 HISTORY OF MALIGNANT NEOPLASM OF PROSTATE: Status: ACTIVE | Noted: 2020-07-16

## 2020-07-16 PROBLEM — K42.9 UMBILICAL HERNIA: Status: ACTIVE | Noted: 2020-03-10

## 2020-07-16 PROBLEM — D64.9 ANEMIA: Status: ACTIVE | Noted: 2020-07-16

## 2020-07-16 PROBLEM — Z51.11 ENCOUNTER FOR CHEMOTHERAPY MANAGEMENT: Status: ACTIVE | Noted: 2019-01-31

## 2020-07-16 PROBLEM — D80.6: Status: ACTIVE | Noted: 2019-04-11

## 2020-07-16 PROBLEM — G47.33 OBSTRUCTIVE SLEEP APNEA: Status: ACTIVE | Noted: 2020-07-16

## 2020-07-16 PROBLEM — D80.2 IGA DEFICIENCY (H): Status: ACTIVE | Noted: 2020-07-16

## 2020-07-16 PROBLEM — D72.819 LEUKOPENIA: Status: ACTIVE | Noted: 2020-07-16

## 2020-07-16 PROBLEM — Z85.72 HISTORY OF LYMPHOMA: Status: ACTIVE | Noted: 2020-07-16

## 2020-07-16 LAB
ABO + RH BLD: NORMAL
ABO + RH BLD: NORMAL
ALBUMIN SERPL-MCNC: 3.9 G/DL (ref 3.4–5)
ALBUMIN UR-MCNC: NEGATIVE MG/DL
ALP SERPL-CCNC: 73 U/L (ref 40–150)
ALT SERPL W P-5'-P-CCNC: 41 U/L (ref 0–70)
ANION GAP SERPL CALCULATED.3IONS-SCNC: 7 MMOL/L (ref 3–14)
APPEARANCE UR: CLEAR
APTT PPP: 105 SEC (ref 22–37)
AST SERPL W P-5'-P-CCNC: 26 U/L (ref 0–45)
BASOPHILS # BLD AUTO: 0 10E9/L (ref 0–0.2)
BASOPHILS NFR BLD AUTO: 0.5 %
BILIRUB SERPL-MCNC: 0.5 MG/DL (ref 0.2–1.3)
BILIRUB UR QL STRIP: NEGATIVE
BLD GP AB SCN SERPL QL: NORMAL
BLOOD BANK CMNT PATIENT-IMP: NORMAL
BUN SERPL-MCNC: 17 MG/DL (ref 7–30)
CALCIUM SERPL-MCNC: 8.7 MG/DL (ref 8.5–10.1)
CHLORIDE SERPL-SCNC: 105 MMOL/L (ref 94–109)
CO2 SERPL-SCNC: 27 MMOL/L (ref 20–32)
COLOR UR AUTO: YELLOW
CREAT SERPL-MCNC: 1.15 MG/DL (ref 0.66–1.25)
DIFFERENTIAL METHOD BLD: ABNORMAL
EOSINOPHIL # BLD AUTO: 0.2 10E9/L (ref 0–0.7)
EOSINOPHIL NFR BLD AUTO: 3.2 %
ERYTHROCYTE [DISTWIDTH] IN BLOOD BY AUTOMATED COUNT: 14.4 % (ref 10–15)
GFR SERPL CREATININE-BSD FRML MDRD: 70 ML/MIN/{1.73_M2}
GLUCOSE SERPL-MCNC: 112 MG/DL (ref 70–99)
GLUCOSE UR STRIP-MCNC: NEGATIVE MG/DL
HCT VFR BLD AUTO: 37.5 % (ref 40–53)
HGB BLD-MCNC: 12.3 G/DL (ref 13.3–17.7)
HGB UR QL STRIP: NEGATIVE
IMM GRANULOCYTES # BLD: 0.1 10E9/L (ref 0–0.4)
IMM GRANULOCYTES NFR BLD: 0.8 %
INR PPP: 1.09 (ref 0.86–1.14)
KETONES UR STRIP-MCNC: NEGATIVE MG/DL
LDH SERPL L TO P-CCNC: 199 U/L (ref 85–227)
LEUKOCYTE ESTERASE UR QL STRIP: NEGATIVE
LYMPHOCYTES # BLD AUTO: 0.5 10E9/L (ref 0.8–5.3)
LYMPHOCYTES NFR BLD AUTO: 7.9 %
MCH RBC QN AUTO: 30.2 PG (ref 26.5–33)
MCHC RBC AUTO-ENTMCNC: 32.8 G/DL (ref 31.5–36.5)
MCV RBC AUTO: 92 FL (ref 78–100)
MONOCYTES # BLD AUTO: 0.7 10E9/L (ref 0–1.3)
MONOCYTES NFR BLD AUTO: 11.1 %
MUCOUS THREADS #/AREA URNS LPF: PRESENT /LPF
NEUTROPHILS # BLD AUTO: 5 10E9/L (ref 1.6–8.3)
NEUTROPHILS NFR BLD AUTO: 76.5 %
NITRATE UR QL: NEGATIVE
NRBC # BLD AUTO: 0 10*3/UL
NRBC BLD AUTO-RTO: 0 /100
PH UR STRIP: 5 PH (ref 5–7)
PHOSPHATE SERPL-MCNC: 2.8 MG/DL (ref 2.5–4.5)
PLATELET # BLD AUTO: 157 10E9/L (ref 150–450)
POTASSIUM SERPL-SCNC: 3.5 MMOL/L (ref 3.4–5.3)
PROT SERPL-MCNC: 6.8 G/DL (ref 6.8–8.8)
RBC # BLD AUTO: 4.07 10E12/L (ref 4.4–5.9)
RBC #/AREA URNS AUTO: <1 /HPF (ref 0–2)
SODIUM SERPL-SCNC: 138 MMOL/L (ref 133–144)
SOURCE: ABNORMAL
SP GR UR STRIP: 1.01 (ref 1–1.03)
SPECIMEN EXP DATE BLD: NORMAL
URATE SERPL-MCNC: 5.1 MG/DL (ref 3.5–7.2)
UROBILINOGEN UR STRIP-MCNC: 0 MG/DL (ref 0–2)
WBC # BLD AUTO: 6.6 10E9/L (ref 4–11)
WBC #/AREA URNS AUTO: 0 /HPF (ref 0–5)

## 2020-07-16 PROCEDURE — 86803 HEPATITIS C AB TEST: CPT | Performed by: INTERNAL MEDICINE

## 2020-07-16 PROCEDURE — 86780 TREPONEMA PALLIDUM: CPT | Performed by: INTERNAL MEDICINE

## 2020-07-16 PROCEDURE — 87798 DETECT AGENT NOS DNA AMP: CPT | Mod: XU | Performed by: INTERNAL MEDICINE

## 2020-07-16 PROCEDURE — 86901 BLOOD TYPING SEROLOGIC RH(D): CPT | Performed by: INTERNAL MEDICINE

## 2020-07-16 PROCEDURE — 86850 RBC ANTIBODY SCREEN: CPT | Performed by: INTERNAL MEDICINE

## 2020-07-16 PROCEDURE — 87340 HEPATITIS B SURFACE AG IA: CPT | Performed by: INTERNAL MEDICINE

## 2020-07-16 PROCEDURE — 82232 ASSAY OF BETA-2 PROTEIN: CPT | Performed by: INTERNAL MEDICINE

## 2020-07-16 PROCEDURE — 83615 LACTATE (LD) (LDH) ENZYME: CPT | Performed by: INTERNAL MEDICINE

## 2020-07-16 PROCEDURE — 81265 STR MARKERS SPECIMEN ANAL: CPT | Performed by: INTERNAL MEDICINE

## 2020-07-16 PROCEDURE — 84550 ASSAY OF BLOOD/URIC ACID: CPT | Performed by: INTERNAL MEDICINE

## 2020-07-16 PROCEDURE — 86704 HEP B CORE ANTIBODY TOTAL: CPT | Performed by: INTERNAL MEDICINE

## 2020-07-16 PROCEDURE — 87389 HIV-1 AG W/HIV-1&-2 AB AG IA: CPT | Performed by: INTERNAL MEDICINE

## 2020-07-16 PROCEDURE — 85730 THROMBOPLASTIN TIME PARTIAL: CPT | Performed by: INTERNAL MEDICINE

## 2020-07-16 PROCEDURE — 86696 HERPES SIMPLEX TYPE 2 TEST: CPT | Performed by: INTERNAL MEDICINE

## 2020-07-16 PROCEDURE — 87521 HEPATITIS C PROBE&RVRS TRNSC: CPT | Performed by: INTERNAL MEDICINE

## 2020-07-16 PROCEDURE — 00000345 ZZHCL STATISTIC REV BONE MARROW OUTSIDE SLIDES TC 88321: Performed by: INTERNAL MEDICINE

## 2020-07-16 PROCEDURE — 84100 ASSAY OF PHOSPHORUS: CPT | Performed by: INTERNAL MEDICINE

## 2020-07-16 PROCEDURE — 81001 URINALYSIS AUTO W/SCOPE: CPT | Performed by: INTERNAL MEDICINE

## 2020-07-16 PROCEDURE — 80053 COMPREHEN METABOLIC PANEL: CPT | Performed by: INTERNAL MEDICINE

## 2020-07-16 PROCEDURE — 86900 BLOOD TYPING SEROLOGIC ABO: CPT | Performed by: INTERNAL MEDICINE

## 2020-07-16 PROCEDURE — 86665 EPSTEIN-BARR CAPSID VCA: CPT | Performed by: INTERNAL MEDICINE

## 2020-07-16 PROCEDURE — G0463 HOSPITAL OUTPT CLINIC VISIT: HCPCS | Mod: ZF

## 2020-07-16 PROCEDURE — 85610 PROTHROMBIN TIME: CPT | Performed by: INTERNAL MEDICINE

## 2020-07-16 PROCEDURE — 86790 VIRUS ANTIBODY NOS: CPT | Performed by: INTERNAL MEDICINE

## 2020-07-16 PROCEDURE — 87516 HEPATITIS B DNA AMP PROBE: CPT | Performed by: INTERNAL MEDICINE

## 2020-07-16 PROCEDURE — 85025 COMPLETE CBC W/AUTO DIFF WBC: CPT | Performed by: INTERNAL MEDICINE

## 2020-07-16 PROCEDURE — 86644 CMV ANTIBODY: CPT | Performed by: INTERNAL MEDICINE

## 2020-07-16 PROCEDURE — 87535 HIV-1 PROBE&REVERSE TRNSCRPJ: CPT | Mod: XU | Performed by: INTERNAL MEDICINE

## 2020-07-16 PROCEDURE — 86695 HERPES SIMPLEX TYPE 1 TEST: CPT | Performed by: INTERNAL MEDICINE

## 2020-07-16 ASSESSMENT — PAIN SCALES - GENERAL: PAINLEVEL: NO PAIN (0)

## 2020-07-16 NOTE — LETTER
7/16/2020         RE: Juvenal Blake  [address] [place] [address]        Dear Colleague,    Thank you for referring your patient, Juvenal Blake, to the [facility] BLOOD AND MARROW TRANSPLANT. Please see a copy of my visit note below.    Mr. Juvenal Blake  is here for the NK trial NAM NK.I explained thetrial .   I have explained the following to the patient and to him in simple terms as feasible    Description: NAM NK is a donor-derived NK cell product comprised of ex vivo activated effector cells with enhanced anti-tumor activity. NK cells have a natural ability to kill cancer cells without prior sensitization and are associated with relapse protection. In order to be eligible for this trial patients must have an eligible NK cell donor. I also explained the side effects of IL-2 injections    Benefits of study participation :  If you agree to take part in this study, there may or may not be direct medical benefit  to you. It is hoped the information learned from this study will benefit other patients  with leukemia or other cancers that may be treated with NK cells in the future.    Risks :  As listed in the consent document provided    Alternatives :  Other treatments that could be considered for your condition may include:  -treatment with other drugs or combination of drugs  -other investigational treatments at this institution or at other research centers  -no treatment at this time with comfort care only    Costs to the patient :  The FATE- cells and IL-2 will be provided to the patient at no cost. Research related testing will be covered by [employer].    You or your insurance company will be responsible for the remaining costs related to  this treatment including but not limited to, the preparative regimen drugs  (cyclophosphamide and fludarabine), the hospitalization, clinic visits, routine lab work  and any medications given to prevent or treat side effects. You will be  responsible for  any costs your insurance does not cover, such as deductibles and co-payments      I also gave him the calendar, explained reproductive counselling as detailed in the consent form.    Ihsan NASH MS  Attending Physician  Pager - [phone]  Email - [email or web address]

## 2020-07-16 NOTE — PROGRESS NOTES
[facility]  BMTCT OPEN VISIT    July 16, 2020        Juvenal Blake is a 54 year old male undergoing evaluation prior to hematopoietic cell transplant or immune effector cell therapy.    Reason for BMTCT: NAM NK for follicular lymphoma     Recent chemotherapy:   Bendamustine +Rituxan in 2010 with CR   Relapsed 2016  Rituxan +Bendamustine again with CR  Relapsed 01/2019  O-CHOP --> CR  04/2020 relapse    Recent infections: Frequent URIs but no other documented infections requiring treatment    Blood thinner use? If yes, why? No    Treatment for diabetes? No      Today, the patient notes the following symptoms:  Review Of Systems  Skin: positive for bug bites, negative for, bruising, lumps or bumps  Eyes: positive for glasses, negative for, visual blurring, double vision, eye pain  Ears/Nose/Throat: positive for frequent URI's, negative for, purulent rhinorrhea, persistent sore throat, tonsillitis  Respiratory: Shortness of breath and dyspnea on exertion. No cough and No hemoptysis  Cardiovascular: positive for exertional chest pain or pressure and dyspnea on exertion. Chest pressure is infrequent but has gotten more frequent over time. Currently occurs monthly, dyspnea on exertion is daily. Negative for, palpitations, tachycardia and irregular heart beat  Gastrointestinal: negative for, nausea, vomiting, abdominal pain, excessive gas or bloating, constipation and diarrhea  Genitourinary: positive for frequency and decreased urinary stream, negative for, dysuria, urgency and incontinence  Musculoskeletal: negative  Neurologic: negative for, numbness or tingling of hands and numbness or tingling of feet  Hematologic/Lymphatic/Immunologic: negative for, chills, fever, night sweats and weight loss  Endocrine: negative for, night sweats and diabetes    Patient reports ongoing SOB/SAMS and occasional chest pressure and pain with activity. Chest pressure/pain occurs about once per month. Dyspnea on exertion  occurs daily. Pt reports being previously evaluated for chest pain a few years ago but unable to locate work up in Care Everywhere. Pending ECHO results, it may be prudent to have pt see cardiology to see if a stress test is warranted before chemotherapy/NK therapy. Will message physician doing close regarding this.       Juvenal Blake's History    Past Medical Hx:  - Maricel 7- high risk prostate cancer. S/p Prostatectomy and radiation  - Hx of hypogammaglobulinemia    Surgical Hx:  - Prostatectomy  - Recent hernia surgery     Family Hx:  - Father with follicular lymphoma ( in his 70's)    Social History     Tobacco Use     Smoking status: Former Smoker     Last attempt to quit: 1995     Years since quittin.0     Smokeless tobacco: Never Used   Substance Use Topics     Alcohol use: Yes     Frequency: 4 or more times a week     Drinks per session: 3 or 4   Works as a  in [place], lives with his wife. Has 4 children.       Juvenal Blake's Medications and Allergies    Current Outpatient Medications   Medication     albuterol (PROAIR HFA/PROVENTIL HFA/VENTOLIN HFA) 108 (90 Base) MCG/ACT inhaler     azelastine (ASTELIN) 0.1 % nasal spray     beclomethasone (QVAR) 80 MCG/ACT AERS IS A DISCONTINUED MEDICATION     beclomethasone HFA (QVAR REDIHALER) 40 MCG/ACT inhaler     fluticasone (FLONASE) 50 MCG/ACT nasal spray     fluticasone (VERAMYST) 27.5 MCG/SPRAY nasal spray     Multiple Vitamin (DAILY-ROSARIO) TABS     naproxen (NAPROSYN) 500 MG tablet     omeprazole (PRILOSEC) 20 MG DR capsule     Psyllium (METAMUCIL FIBER) 51.7 % PACK     sildenafil (VIAGRA) 25 MG tablet     tolterodine ER (DETROL LA) 4 MG 24 hr capsule     No current facility-administered medications for this visit.           Allergies   Allergen Reactions     Ondansetron Other (See Comments) and Itching       Physical Examination    Reviewed in flowsheets    Exam:  Constitutional: healthy, alert and no distress  Head: Normocephalic.  No masses, lesions, tenderness or abnormalities  ENT: ENT exam normal, no neck nodes or sinus tenderness  Cardiovascular: No lifts, heaves, or thrills. RRR. Mild systolic ejection murmur. No clicks gallops or rub  Respiratory: negative. Good diaphragmatic excursion. Lungs clear  Gastrointestinal: Abdomen soft, non-tender. BS normal. No masses./   : Deferred  Musculoskeletal: extremities normal- no gross deformities noted, gait normal and normal muscle tone  Skin: no suspicious lesions or rashes. Scattered bug bites.   Neurologic: Gait normal.  Sensation grossly WNL.  Psychiatric: mentation appears normal and affect normal/bright  Hematologic/Lymphatic/Immunologic: Normal cervical lymph nodes        Frailty Screening    1. Weight loss: Have you lost >10 pounds (or >5% body weight) unintentionally over the last year? No      2. Exhaustion: How often in the past week did you feel that:  o I feel that everything I do is an effort : .Exhaustion: 0 = rarely or none of the time (<1 day)  o I feel I cannot get going: Exhaustion: 0 = rarely or none of the time (<1 day)    3. Weakness:  Hand  strength (measured by MA; calculate average): 36.8     Male BMI Frailty Criteria for  Male  Strength Female BMI Frailty Criteria for  Female  Strength   ?24 ?29 ?23 ?17   24.1 - 26 ?30 23.1 - 26 ?17.3   26.1 - 28 ?30 26.1 - 29 ?18   >28 ?32 >29 ?21     4. Slowness:  15 foot walk time (measured by MA):  5 seconds    Height Frailty Criteria for  15 Foot Walk Time   Men   ?173 cm ? 7 seconds    >173 cm  ? 6 seconds   Women   ?159 cm ? 7 seconds   >159 cm  ? 6 seconds     5. Physical activity:     *Please complete 2 calculations for kcal (see frailty worksheet for equations)     Energy expenditure for frailty: 9265 kcal expended per week     Gender Frailty Criteria for Low Physical Activity   Male <383 kcal/week   Female <270 kcal/weel     IPAQ score: 4875 MET minutes per week       Juvenal Blake does not meet the following  criteria for prefrailty (score 1-2) or frailty (score 3+):   Frailty Score is: 0      Additional assessments not to be used in frailty calculation:   What types of physical activity can you tolerate? Walking (short distances), light lifting.     Sit to stand test (time to complete 5 reps): 25 seconds    Standing balance in 10 seconds:  Record the Total number of seconds(0-30)--add a+b+c ( take best attempt for each)    First attempt: 28 seconds    Second attempt: 28 seconds        Overall Assessment    I have reviewed the diagnostic data, medications, frailty screening, and general processes prior to BMTCT.  I have notified the Primary BMT Physician/and or Attending Physician in the clinic of any issues. We also discussed in detail the database and biorepository research for which Juvenal Blake is eligible. We discussed the potential risks and potential benefits of each of these protocols individually. We explained potential alternatives to the protocols discussed. We explained to the patient that participation is voluntary and that consent may be withdrawn at any time.       Consents Signed:    Blood transfusion consent form    Ethnicity form    [facility] database    [facility] BMTCT Database    Present during the discussion was Juvenal Blake. Copies of the signed consent forms will be provided to the patient on admission. No procedures specific to any studies were performed prior to the patient signing the consent form.    Juvenal Hayden had the opportunity to ask questions, and I answered all of the questions to the best of my ability.      Topher Haro PA-C  [phone]

## 2020-07-16 NOTE — LETTER
7/16/2020         RE: Juvenal Blake  [address] [place] [address]        Dear Colleague,    Thank you for referring your patient, Juvenal Blake, to the [facility] [place] BLOOD AND MARROW TRANSPLANT. Please see a copy of my visit note below.        [facility]  BMTCT OPEN VISIT    July 16, 2020        Juvenal Blake is a 54 year old male undergoing evaluation prior to hematopoietic cell transplant or immune effector cell therapy.    Reason for BMTCT: NAM NK for follicular lymphoma     Recent chemotherapy:   Bendamustine +Rituxan in 2010 with CR   Relapsed 2016  Rituxan +Bendamustine again with CR  Relapsed 01/2019  O-CHOP --> CR  04/2020 relapse    Recent infections: Frequent URIs but no other documented infections requiring treatment    Blood thinner use? If yes, why? No    Treatment for diabetes? No      Today, the patient notes the following symptoms:  Review Of Systems  Skin: positive for bug bites, negative for, bruising, lumps or bumps  Eyes: positive for glasses, negative for, visual blurring, double vision, eye pain  Ears/Nose/Throat: positive for frequent URI's, negative for, purulent rhinorrhea, persistent sore throat, tonsillitis  Respiratory: Shortness of breath and dyspnea on exertion. No cough and No hemoptysis  Cardiovascular: positive for exertional chest pain or pressure and dyspnea on exertion. Chest pressure is infrequent but has gotten more frequent over time. Currently occurs monthly, dyspnea on exertion is daily. Negative for, palpitations, tachycardia and irregular heart beat  Gastrointestinal: negative for, nausea, vomiting, abdominal pain, excessive gas or bloating, constipation and diarrhea  Genitourinary: positive for frequency and decreased urinary stream, negative for, dysuria, urgency and incontinence  Musculoskeletal: negative  Neurologic: negative for, numbness or tingling of hands and numbness or tingling of feet  Hematologic/Lymphatic/Immunologic: negative for,  chills, fever, night sweats and weight loss  Endocrine: negative for, night sweats and diabetes    Patient reports ongoing SOB/SAMS and occasional chest pressure and pain with activity. Chest pressure/pain occurs about once per month. Dyspnea on exertion occurs daily. Pt reports being previously evaluated for chest pain a few years ago but unable to locate work up in Care Everywhere. Pending ECHO results, it may be prudent to have pt see cardiology to see if a stress test is warranted before chemotherapy/NK therapy. Will message physician doing close regarding this.       Juvenal Blake's History    Past Medical Hx:  - [place] 5- high risk prostate cancer. S/p Prostatectomy and radiation  - Hx of hypogammaglobulinemia    Surgical Hx:  - Prostatectomy  - Recent hernia surgery     Family Hx:  - Father with follicular lymphoma ( in his 70's)    Social History     Tobacco Use     Smoking status: Former Smoker     Last attempt to quit: 1995     Years since quittin.0     Smokeless tobacco: Never Used   Substance Use Topics     Alcohol use: Yes     Frequency: 4 or more times a week     Drinks per session: 3 or 4   Works as a  in [place], lives with his wife. Has 4 children.       Juvenal Blake's Medications and Allergies    Current Outpatient Medications   Medication     albuterol (PROAIR HFA/PROVENTIL HFA/VENTOLIN HFA) 108 (90 Base) MCG/ACT inhaler     azelastine (ASTELIN) 0.1 % nasal spray     beclomethasone (QVAR) 80 MCG/ACT AERS IS A DISCONTINUED MEDICATION     beclomethasone HFA (QVAR REDIHALER) 40 MCG/ACT inhaler     fluticasone (FLONASE) 50 MCG/ACT nasal spray     fluticasone (VERAMYST) 27.5 MCG/SPRAY nasal spray     Multiple Vitamin (DAILY-ROSARIO) TABS     naproxen (NAPROSYN) 500 MG tablet     omeprazole (PRILOSEC) 20 MG DR capsule     Psyllium (METAMUCIL FIBER) 51.7 % PACK     sildenafil (VIAGRA) 25 MG tablet     tolterodine ER (DETROL LA) 4 MG 24 hr capsule     No current  facility-administered medications for this visit.           Allergies   Allergen Reactions     Ondansetron Other (See Comments) and Itching       Physical Examination    Reviewed in flowsheets    Exam:  Constitutional: healthy, alert and no distress  Head: Normocephalic. No masses, lesions, tenderness or abnormalities  ENT: ENT exam normal, no neck nodes or sinus tenderness  Cardiovascular: No lifts, heaves, or thrills. RRR. Mild systolic ejection murmur. No clicks gallops or rub  Respiratory: negative. Good diaphragmatic excursion. Lungs clear  Gastrointestinal: Abdomen soft, non-tender. BS normal. No masses./   : Deferred  Musculoskeletal: extremities normal- no gross deformities noted, gait normal and normal muscle tone  Skin: no suspicious lesions or rashes. Scattered bug bites.   Neurologic: Gait normal.  Sensation grossly WNL.  Psychiatric: mentation appears normal and affect normal/bright  Hematologic/Lymphatic/Immunologic: Normal cervical lymph nodes        Frailty Screening    1. Weight loss: Have you lost >10 pounds (or >5% body weight) unintentionally over the last year? No      2. Exhaustion: How often in the past week did you feel that:  o I feel that everything I do is an effort : .Exhaustion: 0 = rarely or none of the time (<1 day)  o I feel I cannot get going: Exhaustion: 0 = rarely or none of the time (<1 day)    3. Weakness:  Hand  strength (measured by MA; calculate average): 36.8     Male BMI Frailty Criteria for  Male  Strength Female BMI Frailty Criteria for  Female  Strength   ?24 ?29 ?23 ?17   24.1 - 26 ?30 23.1 - 26 ?17.3   26.1 - 28 ?30 26.1 - 29 ?18   >28 ?32 >29 ?21     4. Slowness:  15 foot walk time (measured by MA):  5 seconds    Height Frailty Criteria for  15 Foot Walk Time   Men   ?173 cm ? 7 seconds    >173 cm  ? 6 seconds   Women   ?159 cm ? 7 seconds   >159 cm  ? 6 seconds     5. Physical activity:     *Please complete 2 calculations for kcal (see frailty worksheet  for equations)     Energy expenditure for frailty: 9265 kcal expended per week     Gender Frailty Criteria for Low Physical Activity   Male <383 kcal/week   Female <270 kcal/weel     IPAQ score: 4875 MET minutes per week       Juvenal Blake does not meet the following criteria for prefrailty (score 1-2) or frailty (score 3+):   Frailty Score is: 0      Additional assessments not to be used in frailty calculation:   What types of physical activity can you tolerate? Walking (short distances), light lifting.     Sit to stand test (time to complete 5 reps): 25 seconds    Standing balance in 10 seconds:  Record the Total number of seconds(0-30)--add a+b+c ( take best attempt for each)    First attempt: 28 seconds    Second attempt: 28 seconds        Overall Assessment    I have reviewed the diagnostic data, medications, frailty screening, and general processes prior to BMTCT.  I have notified the Primary BMT Physician/and or Attending Physician in the clinic of any issues. We also discussed in detail the database and biorepository research for which Juvenal Blake is eligible. We discussed the potential risks and potential benefits of each of these protocols individually. We explained potential alternatives to the protocols discussed. We explained to the patient that participation is voluntary and that consent may be withdrawn at any time.       Consents Signed:    Blood transfusion consent form    Ethnicity form    [facility]    [facility] Database    Present during the discussion was Juvenal Blake. Copies of the signed consent forms will be provided to the patient on admission. No procedures specific to any studies were performed prior to the patient signing the consent form.    Juvenal Blake had the opportunity to ask questions, and I answered all of the questions to the best of my ability.      Topher Haro PA-C  [phone]      Again, thank you for allowing me to participate in the care of your patient.         Sincerely,        BMT Advanced Practice Provider

## 2020-07-16 NOTE — LETTER
7/16/2020         RE: Juvenal Blake  [address] [place] [address]        Dear Colleague,    Thank you for referring your patient, Juvenal Blake, to the [facility] BLOOD AND MARROW TRANSPLANT. Please see a copy of my visit note below.    BMT Teaching Flowsheet      Teaching Topic: work up    Person(s) involved in teaching: Patient  Motivation Level  Asks Questions: Yes  Eager to Learn: Yes  Cooperative: Yes  Receptive (willing/able to accept information): Yes  Any cultural factors/Samaritan beliefs that may influence understanding or compliance? No    Patient had vitals, height and weight done.  Consents explained and signed.  Calendar reviewed and questions were answered.  Labs drawn.  Urine collection device given to patient.    Instructional Materials Used/Given: Consents, calendar    Time spent with patient: 60 minutes.    Specific Concerns: NA      Again, thank you for allowing me to participate in the care of your patient.        Sincerely,        BMT Nurse

## 2020-07-16 NOTE — PROGRESS NOTES
BMT Teaching Flowsheet      Teaching Topic: work up    Person(s) involved in teaching: Patient  Motivation Level  Asks Questions: Yes  Eager to Learn: Yes  Cooperative: Yes  Receptive (willing/able to accept information): Yes  Any cultural factors/Religion beliefs that may influence understanding or compliance? No    Patient had vitals, height and weight done.  Consents explained and signed.  Calendar reviewed and questions were answered.  Labs drawn.  Urine collection device given to patient.    Instructional Materials Used/Given: Consents, calendar    Time spent with patient: 60 minutes.    Specific Concerns: NA

## 2020-07-16 NOTE — NURSING NOTE
Oncology Rooming Note    July 16, 2020 2:42 PM   Juvenal Blake is a 54 year old male who presents for:    Chief Complaint   Patient presents with     RECHECK     Follicular Lymphoma      Initial Vitals: /85   Pulse 79   Temp 97.9  F (36.6  C) (Oral)   Wt 109.2 kg (240 lb 11.2 oz)   SpO2 98%  There is no height or weight on file to calculate BMI. There is no height or weight on file to calculate BSA.  No Pain (0) Comment: Data Unavailable   No LMP for male patient.  Allergies reviewed: Yes  Medications reviewed: Yes    Medications: Medication refills not needed today.  Pharmacy [facility] entered into [facility]: [id number] - [place], [place] - [address] [facility]    Clinical concerns: None       Suri Crabtree CMA

## 2020-07-17 DIAGNOSIS — C82.08 FOLLICULAR LYMPHOMA GRADE I, LYMPH NODES OF MULTIPLE SITES (H): Primary | ICD-10-CM

## 2020-07-17 LAB
B2 MICROGLOB SERPL-MCNC: 2.4 MG/L
CMV IGG SERPL QL IA: 5.5 AI (ref 0–0.8)
COPATH REPORT: NORMAL
EBV VCA IGG SER QL IA: >8 AI (ref 0–0.8)
HBV CORE AB SERPL QL IA: NONREACTIVE
HBV SURFACE AG SERPL QL IA: NONREACTIVE
HCV AB SERPL QL IA: NONREACTIVE
HIV 1+2 AB+HIV1 P24 AG SERPL QL IA: NONREACTIVE
HSV1 IGG SERPL QL IA: 2.1 AI (ref 0–0.8)
HSV2 IGG SERPL QL IA: 2.6 AI (ref 0–0.8)
HTLV I+II AB PATRN SER IB-IMP: NEGATIVE
T PALLIDUM AB SER QL: NONREACTIVE

## 2020-07-17 NOTE — PROGRESS NOTES
Mr. Juvenal Blake  is here for the NK trial SHAWNA NK.I explained thetrial .   I have explained the following to the patient and to him in simple terms as feasible    Description: SHAWNA NK is a donor-derived NK cell product comprised of ex vivo activated effector cells with enhanced anti-tumor activity. NK cells have a natural ability to kill cancer cells without prior sensitization and are associated with relapse protection. In order to be eligible for this trial patients must have an eligible NK cell donor. I also explained the side effects of IL-2 injections    Benefits of study participation :  If you agree to take part in this study, there may or may not be direct medical benefit  to you. It is hoped the information learned from this study will benefit other patients  with leukemia or other cancers that may be treated with NK cells in the future.    Risks :  As listed in the consent document provided    Alternatives :  Other treatments that could be considered for your condition may include:  -treatment with other drugs or combination of drugs  -other investigational treatments at this institution or at other research centers  -no treatment at this time with comfort care only    Costs to the patient :  The FATE- cells and IL-2 will be provided to the patient at no cost. Research related testing will be covered by [employer].    You or your insurance company will be responsible for the remaining costs related to  this treatment including but not limited to, the preparative regimen drugs  (cyclophosphamide and fludarabine), the hospitalization, clinic visits, routine lab work  and any medications given to prevent or treat side effects. You will be responsible for  any costs your insurance does not cover, such as deductibles and co-payments      I also gave him the calendar, explained reproductive counselling as detailed in the consent form.    Ihsan NASH MS  Attending Physician  Pager -  [phone]  Email - [email or web address]

## 2020-07-20 ENCOUNTER — APPOINTMENT (OUTPATIENT)
Dept: EDUCATION SERVICES | Facility: CLINIC | Age: 55
End: 2020-07-20
Payer: COMMERCIAL

## 2020-07-20 ENCOUNTER — OFFICE VISIT (OUTPATIENT)
Dept: TRANSPLANT | Facility: CLINIC | Age: 55
End: 2020-07-20
Attending: INTERNAL MEDICINE
Payer: COMMERCIAL

## 2020-07-20 ENCOUNTER — RECORDS - HEALTHEAST (OUTPATIENT)
Dept: ADMINISTRATIVE | Facility: OTHER | Age: 55
End: 2020-07-20

## 2020-07-20 ENCOUNTER — ANCILLARY PROCEDURE (OUTPATIENT)
Dept: CARDIOLOGY | Facility: CLINIC | Age: 55
End: 2020-07-20
Attending: INTERNAL MEDICINE
Payer: COMMERCIAL

## 2020-07-20 DIAGNOSIS — C82.08 FOLLICULAR LYMPHOMA GRADE I, LYMPH NODES OF MULTIPLE SITES (H): Primary | ICD-10-CM

## 2020-07-20 DIAGNOSIS — C85.90 LYMPHOMA (H): ICD-10-CM

## 2020-07-20 DIAGNOSIS — C85.90 NHL (NON-HODGKIN'S LYMPHOMA) (H): Primary | ICD-10-CM

## 2020-07-20 DIAGNOSIS — Z86.2 PERSONAL HISTORY OF DISEASES OF BLOOD AND BLOOD-FORMING ORGANS: ICD-10-CM

## 2020-07-20 DIAGNOSIS — C82.90 FOLLICULAR LYMPHOMA (H): ICD-10-CM

## 2020-07-20 LAB
INTERPRETATION ECG - MUSE: NORMAL
MPX SERIES: NONREACTIVE
PATH REPORT.COMMENTS IMP SPEC: NORMAL
PATH REPORT.FINAL DX SPEC: NORMAL
PATH REPORT.RELEVANT HX SPEC: NORMAL
RESULT FLAG (HE HISTORICAL CONVERSION): NORMAL
T CRUZI AB SER DONR QL: NONREACTIVE
WNV RNA SPEC QL NAA+PROBE: NONREACTIVE

## 2020-07-20 PROCEDURE — 93010 ELECTROCARDIOGRAM REPORT: CPT | Performed by: INTERNAL MEDICINE

## 2020-07-20 PROCEDURE — 93005 ELECTROCARDIOGRAM TRACING: CPT

## 2020-07-20 NOTE — LETTER
7/20/2020         RE: Juvenal Blake  [address] [place] [address]        Dear Colleague,    Thank you for referring your patient, Juvneal Blake, to the [facility] BLOOD AND MARROW TRANSPLANT. Please see a copy of my visit note below.    See nursing note    KAYA Velasco      Again, thank you for allowing me to participate in the care of your patient.        Sincerely,        BMT Nurse

## 2020-07-20 NOTE — LETTER
7/20/2020         RE: Juvenal Blake  [address] [place] [address]        Dear Colleague,    Thank you for referring your patient, Juvenal Blake, to the [facility] BLOOD AND MARROW TRANSPLANT. Please see a copy of my visit note below.    BMT Teaching Flowsheet    Juvenal Blake is a 54 year old male  The encounter diagnosis was Follicular lymphoma grade i, lymph nodes of multiple sites (H).    Teaching Topic: 2015-46    Person(s) involved in teaching: Patient and Spouse  Motivation Level  Asks Questions: Yes  Eager to Learn: Yes  Cooperative: Yes  Receptive (willing/able to accept information): Yes  Any cultural factors/Lutheran beliefs that may influence understanding or compliance? No    Patient and Caregiver demonstrates understanding of the following:  - Reason for the appointment, diagnosis and treatment plan: Yes  - Knowledge of proper use of medications and conditions for which they are ordered (with special attention to potential side effects or drug interactions): Yes  - Which situations necessitate calling provider and whom to contact: Yes    Teaching concerns addressed: No teaching concerns identified.    Proper use and care of (medical equipment, care aids, etc.) NA  Pain management techniques: NA  Patient instructed on hand hygiene: Yes  How and/when to access community resources: Yes    Infection Control:  Patient and Caregiver demonstrates understanding of the following:  Surgical procedure site care taught NA  Signs and symptoms of infection taught Yes  Wound care taught NA  Central venous catheter care taught NA    Instructional Materials Used/Given:   Patient was given and reviewed BMT Teaching Binder, including medication pamphlets, sample treatment calendars, consents, contact phone numbers, hospital and discharge guidelines.  Patient verbalizes understanding of the material and was encouraged to call with any additional questions.       Time spent with patient: 45  minutes.    Specific Concerns: NA    Again, thank you for allowing me to participate in the care of your patient.        Sincerely,        BMT Nurse Coordinator

## 2020-07-20 NOTE — LETTER
"    7/20/2020         RE: Juvenal Blake  [address] [place] [address]        Dear Colleague,    Thank you for referring your patient, Juvenal Blake, to the [facility] BLOOD AND MARROW TRANSPLANT. Please see a copy of my visit note below.    Pharmacy Assessment - Pre-Stem Cell Transplant    Assessments & Recommendations:  1) Allergy to ondansetron, consider changing to Granisetron as there are much less receptor overlap and side effects.   2) Given his use of marijuana and inhaled steroids/nose sprays may want to consider expanding the antifungal prophylaxis to include mold coverage, with an azole, once the Chemotherapy lymphodepletion is \"completed\".   3) Hold the multivitamin during chemotherapy.  4) Hold NSAIDS in acute phase of the NK cell infusion process.     History of Present Illness:  Juvenal Blake is a 54 year old year old male diagnosed with NHL.  He has been treated with Bendamustine + Rituxan, Bendamustine + Rituxan once again then R-CHOP.  He is now being work up for Nam-NK Stem Cell infusion on protocol MT 2015-46, which utilizes Flu/Cy as a conditioning lymphodepletion regimen.    Pertinent labs/tests:  Viral Testing:  CMV(+) / HSV(+/+) / EBV(+) / VZV (?)  Ejection Fraction: _____% (future date)  QTc: _____msec (future date)    Weights:   Wt Readings from Last 3 Encounters:   07/16/20 109.2 kg (240 lb 11.2 oz)   Ideal body weight: 73 kg (160 lb 15 oz)  Adjusted ideal body weight: 87.5 kg (192 lb 13.5 oz)  % IBW:  149% of IBW  There is no height or weight on file to calculate BMI.    Primary BMT Physician: Dr. Corral  BMT RN Coordinator:  Hoang Stoner    Past Medical History:  No past medical history on file.    Medication Allergies:  Allergies   Allergen Reactions     Ondansetron Other (See Comments) and Itching       Current Medications (pre-admit):  Current Outpatient Medications   Medication Sig Dispense Refill     albuterol (PROAIR HFA/PROVENTIL HFA/VENTOLIN HFA) 108 (90 Base) " "MCG/ACT inhaler Inhale 2 puffs into the lungs       azelastine (ASTELIN) 0.1 % nasal spray Spray 1 spray in nostril       beclomethasone (QVAR) 80 MCG/ACT AERS IS A DISCONTINUED MEDICATION Inhale 1 puff into the lungs       beclomethasone HFA (QVAR REDIHALER) 40 MCG/ACT inhaler        fluticasone (FLONASE) 50 MCG/ACT nasal spray        fluticasone (VERAMYST) 27.5 MCG/SPRAY nasal spray        Multiple Vitamin (DAILY-ROSARIO) TABS 1 tab(s)       naproxen (NAPROSYN) 500 MG tablet 1 tab(s)       omeprazole (PRILOSEC) 20 MG DR capsule 1 cap(s)       Psyllium (METAMUCIL FIBER) 51.7 % PACK Take 1 packet by mouth       sildenafil (VIAGRA) 25 MG tablet Take 25 mg by mouth       tolterodine ER (DETROL LA) 4 MG 24 hr capsule Take 4 mg by mouth         Herbal Medication/Nutritional Supplements:  Daily multivitamin     Smoking/Past Drug Use:  Non smoker, but does \"currently\" smoke marijuana. He states also having been taking medical marijuana but his prescription has lapsed.     Nausea/Vomiting, Pain, or other issues:  No issues    Summary:  I met with Juvenal Blake via teleconference for approximately 30 minutes.  We discussed his current and NK cell infusion medications including the lymphodepletion chemotherapy, antiemetics, prophylactic antibiotics and miscellaneous medications (claritin, IL-2, premedications for NK cell infusion reactions) .    Again, thank you for allowing me to participate in the care of your patient.        Sincerely,        BMT Pharm D, RPH    "

## 2020-07-20 NOTE — PROGRESS NOTES
"Pharmacy Assessment - Pre-Stem Cell Transplant    Assessments & Recommendations:  1) Allergy to ondansetron, consider changing to Granisetron as there are much less receptor overlap and side effects.   2) Given his use of marijuana and inhaled steroids/nose sprays may want to consider expanding the antifungal prophylaxis to include mold coverage, with an azole, once the Chemotherapy lymphodepletion is \"completed\".   3) Hold the multivitamin during chemotherapy.  4) Hold NSAIDS in acute phase of the NK cell infusion process.     History of Present Illness:  Juvenal Blake is a 54 year old year old male diagnosed with NHL.  He has been treated with Bendamustine + Rituxan, Bendamustine + Rituxan once again then R-CHOP.  He is now being work up for Nam-NK Stem Cell infusion on protocol MT 2015-46, which utilizes Flu/Cy as a conditioning lymphodepletion regimen.    Pertinent labs/tests:  Viral Testing:  CMV(+) / HSV(+/+) / EBV(+) / VZV (?)  Ejection Fraction: _____% (future date)  QTc: _____msec (future date)    Weights:   Wt Readings from Last 3 Encounters:   07/16/20 109.2 kg (240 lb 11.2 oz)   Ideal body weight: 73 kg (160 lb 15 oz)  Adjusted ideal body weight: 87.5 kg (192 lb 13.5 oz)  % IBW:  149% of IBW  There is no height or weight on file to calculate BMI.    Primary BMT Physician: Dr. Corral  BMT RN Coordinator:  Hoang Stoner    Past Medical History:  No past medical history on file.    Medication Allergies:  Allergies   Allergen Reactions     Ondansetron Other (See Comments) and Itching       Current Medications (pre-admit):  Current Outpatient Medications   Medication Sig Dispense Refill     albuterol (PROAIR HFA/PROVENTIL HFA/VENTOLIN HFA) 108 (90 Base) MCG/ACT inhaler Inhale 2 puffs into the lungs       azelastine (ASTELIN) 0.1 % nasal spray Spray 1 spray in nostril       beclomethasone (QVAR) 80 MCG/ACT AERS IS A DISCONTINUED MEDICATION Inhale 1 puff into the lungs       beclomethasone HFA (QVAR " "REDIHALER) 40 MCG/ACT inhaler        fluticasone (FLONASE) 50 MCG/ACT nasal spray        fluticasone (VERAMYST) 27.5 MCG/SPRAY nasal spray        Multiple Vitamin (DAILY-ROSARIO) TABS 1 tab(s)       naproxen (NAPROSYN) 500 MG tablet 1 tab(s)       omeprazole (PRILOSEC) 20 MG DR capsule 1 cap(s)       Psyllium (METAMUCIL FIBER) 51.7 % PACK Take 1 packet by mouth       sildenafil (VIAGRA) 25 MG tablet Take 25 mg by mouth       tolterodine ER (DETROL LA) 4 MG 24 hr capsule Take 4 mg by mouth         Herbal Medication/Nutritional Supplements:  Daily multivitamin     Smoking/Past Drug Use:  Non smoker, but does \"currently\" smoke marijuana. He states also having been taking medical marijuana but his prescription has lapsed.     Nausea/Vomiting, Pain, or other issues:  No issues    Summary:  I met with Juvenal Blake via teleconference for approximately 30 minutes.  We discussed his current and NK cell infusion medications including the lymphodepletion chemotherapy, antiemetics, prophylactic antibiotics and miscellaneous medications (claritin, IL-2, premedications for NK cell infusion reactions) .  "

## 2020-07-20 NOTE — PROGRESS NOTES
BMT Teaching Flowsheet    Juvenal Blake is a 54 year old male  The encounter diagnosis was Follicular lymphoma grade i, lymph nodes of multiple sites (H).    Teaching Topic: 2015-46    Person(s) involved in teaching: Patient and Spouse  Motivation Level  Asks Questions: Yes  Eager to Learn: Yes  Cooperative: Yes  Receptive (willing/able to accept information): Yes  Any cultural factors/Protestant beliefs that may influence understanding or compliance? No    Patient and Caregiver demonstrates understanding of the following:  - Reason for the appointment, diagnosis and treatment plan: Yes  - Knowledge of proper use of medications and conditions for which they are ordered (with special attention to potential side effects or drug interactions): Yes  - Which situations necessitate calling provider and whom to contact: Yes    Teaching concerns addressed: No teaching concerns identified.    Proper use and care of (medical equipment, care aids, etc.) NA  Pain management techniques: NA  Patient instructed on hand hygiene: Yes  How and/when to access community resources: Yes    Infection Control:  Patient and Caregiver demonstrates understanding of the following:  Surgical procedure site care taught NA  Signs and symptoms of infection taught Yes  Wound care taught NA  Central venous catheter care taught NA    Instructional Materials Used/Given:   Patient was given and reviewed BMT Teaching Binder, including medication pamphlets, sample treatment calendars, consents, contact phone numbers, hospital and discharge guidelines.  Patient verbalizes understanding of the material and was encouraged to call with any additional questions.       Time spent with patient: 45 minutes.    Specific Concerns: NA

## 2020-07-21 ENCOUNTER — VIRTUAL VISIT (OUTPATIENT)
Dept: TRANSPLANT | Facility: CLINIC | Age: 55
End: 2020-07-21
Attending: INTERNAL MEDICINE
Payer: COMMERCIAL

## 2020-07-21 ENCOUNTER — DOCUMENTATION ONLY (OUTPATIENT)
Dept: CARE COORDINATION | Facility: CLINIC | Age: 55
End: 2020-07-21

## 2020-07-21 DIAGNOSIS — Z71.9 VISIT FOR COUNSELING: Primary | ICD-10-CM

## 2020-07-21 ASSESSMENT — ANXIETY QUESTIONNAIRES
3. WORRYING TOO MUCH ABOUT DIFFERENT THINGS: MORE THAN HALF THE DAYS
7. FEELING AFRAID AS IF SOMETHING AWFUL MIGHT HAPPEN: SEVERAL DAYS
5. BEING SO RESTLESS THAT IT IS HARD TO SIT STILL: SEVERAL DAYS
2. NOT BEING ABLE TO STOP OR CONTROL WORRYING: MORE THAN HALF THE DAYS
1. FEELING NERVOUS, ANXIOUS, OR ON EDGE: MORE THAN HALF THE DAYS
6. BECOMING EASILY ANNOYED OR IRRITABLE: MORE THAN HALF THE DAYS
GAD7 TOTAL SCORE: 11

## 2020-07-21 ASSESSMENT — PATIENT HEALTH QUESTIONNAIRE - PHQ9: 5. POOR APPETITE OR OVEREATING: SEVERAL DAYS

## 2020-07-21 NOTE — PROGRESS NOTES
CLINICAL SOCIAL WORK   PSYCHOSOCIAL ASSESSMENT  BLOOD AND MARROW TRANSPLANT SERVICE      Assessment completed on July 21, 2020 of living situation, support system, financial status, functional status, coping, stressors, need for resources and social work intervention provided as needed.  Information for this assessment was provided by Pt  report in addition to medical chart review and consultation with medical team.     Present at assessment: Patient, Juvenal Blake was present for this assessment conducted by KRISTEN Rosario .     Diagnosis: Non-Hodgkin's Lymphoma (NHL)    Date of Diagnosis: 2019    Transplant type: NAM NK    Donor:   Related allogeneic donor stem cell transplant  Donor: Son    Physician: Gage Corral MD    Nurse Coordinator: Hoang Stoner RN    : CITLALLI Rosario, [facility]     Permanent Address:   [address] [place] [address]      Contact Information:  Pt Home Phone: [phone]  Pt Cell Phone: [phone]  Pt Email: [email or web address]  Pt's wife Nancy's  Phone: [phone]    Presenting Information:  Juvenal is a 54 year old male diagnosed with NHL who presents for evaluation for an NAM NK at the [facility] ([facility]  The pt's wife Nancy did listen to some of the visit today.     Decision Making:   Self     Health Care Directive:   Patient considering completing the pt has a HCD at home and is working through completing this document.    Relationship Status:    to his wife Nancy. Both indicate their relationship as stable and supportive.    Special Needs: None identified at this time.     Family/Support System: Pt endorsed a good support system including family and close friends who will be available to support Pt throughout transplant process.     Spouse: Nancy Blake    Children: 2 biological son's and 2 step-son's GwynJuvenal Andrew, Josh    Grandchildren: 4 grandchildren Mikayla and Stevie are living with the  pt    Parents:     Siblings: 3 siblings, 1 brother and 2 sisters    Friends: some close friends and co-workers    Caregiver: SW discussed with pt the caregiver role and expectation at length. Pt is agreeable to having a full time caregiver for a minimum of 30 days until cleared by the BMT physician. Pt's identified caregivers are his wife and son's. Pt signed the caregiver contract which will be scanned into the EMR. Caregiver education and resources provided. No caregiver concerns identified. Pt and Pt's wife confirmed understanding caregiving requirement, including driving restrictions, as discussed during psychosocial assessment.     Name & Numbers  Nancy Blake [phone]    Transportation Mode:  Private Car . Pt is aware of driving restrictions post-BMT and the need for the caregiver is to drive until cleared to drive by the  BMT physician. SW provided information on parking info and monthly parking pass options. Pt will utilize the [employer] security shuttle for transportation to and from the [facility] and BMT Clinic/Hospital.    Insurance:  No Insurance issues identified.Juvenal did share that he will either have to start paying for his insurance soon or will be switching to Nancy's depending on the price. Both work for the same employer so they are trying to figure out what is the best financial decision.  Pt denied specific insurance concerns at this time. BERNIE reiterated information about the BMT [employer]  should specific insurance questions arise as Pt moves through transplant process.     Sources of Income:  Income concerns identified  The pt has stopped working at this time and has no income. He does have LTD through his job, but he needs to be out of work for 3 months prior for it to start. The pt did ask about applying for Social Security Disability and CSW did explain how this process works. CSW also spoke with the pt about ryan options available and these applications were left  for the pt in the clinic per his request.  Pt denied anticipation of financial hardship related to BMT at this time.  SW encouraged Pt to contact this SW for additional potential resources should financial situation change.     Employment:   Employer: [employer]  Position:   Last Day of Work: 7/15/20     Spouse's Employment:  Employer:  [employer]      Mental Health: Mental health symptoms currently identified       PHQ-9 assessment, score was 4 ,which indicates no current signs of depression.  GAD7 assessment, score was 11, which indicates moderate signs of anxiety.     We talked about how some patients may see an increase in feelings of anxiety or depression while hospitalized for extended periods along with isolation. Encouraged Juvenal and Nancy to let us know if they are noticing an increase in symptoms. We talked about the variety of modalities available to use as coping mechanisms (including but not limited to guided imagery, relaxation techniques, progressive muscle relaxation, counseling/talk therapy and medication).    Juvenal discussed that he is having an increase in his anxiety levels as he prepared for the BMT process. Overall, Juvenal notes moments of his mind wondering and change sin his mood. Juvenal shared that he has not done anything to change his anxiety, but feels that once he is in the hospital he will feel better that things are starting. Juvenal does note to smoking marijuana for nausea, but does think that it might help his anxiety as well. CSW discussed coping skills to help with his anxiety, but Juvenal feels things are tolerable for him at this time.     Chemical Use: Chemical use identified. Juvenal notes that he drinks alcohol daily- (3-4 at a time) and uses marijuana daily. Juvenal notes that the marijuana use is to help with his nausea. Discussed the need to abstain from these during the BMT process and the pt expressed understanding.  Update was provided to   "Shayna.      Trauma/Loss/Abuse History: Multiple losses associated with cancer diagnosis and treatment, including health, employment, changes to physical appearance, etc.     Spirituality:  Patient does not identify with martha community.  Wife is Restorationist and at times attend with her, but was raised Yarsani.     Coping: Pt noted that he is currently feeling \"nervous and ready to begin\".  Pt shared that his main coping mechanisms are being outside and talking to his wife.   SW and Pt discussed additional positive coping mechanisms that Pt can utilize while in the hospital.     Caregiver Coping: Not assessed during this process due to Nancy not being available.     Education Provided: Transplant process expectations, Caregiver requirements, Caregiver self-care, Financial issues related to transplant, Financial resources/grants available, Common psychosocial stressors pre/post transplant, Support group(s) available, Tour/layout of the inpatient unit/non-use of cell phones, Hospital resources available, Web site information, Resources for transplant patients and their families as well as the Clinical Social Work role.     Interventions Provided: Supportive counseling and education     Recreation/Leisure Activities:  Playing pool and gardening    Plans for Hospital Stay Leisure:  Watch movies    Assessment and Recommendations for Team:  Pt is a 54 year old male diagnosed with NHL who is here undergoing preparation for a planned NAM NK transplant.     Pt is a pleasant and well articulate male who feels comfortable communicating with the medical team. Pt has a good support team who are involved.     Pt may benefit from ongoing psychosocial support in regards to coping with the adjustment to the BMT process. Pt's family may benefit from ongoing psychosocial support in regards to coping with the adjustment to the BMT process and may also benefit from attending the BMT Caregiver Support Group that meets weekly on the " inpatient unit.     Pt has a good support system and a good caregiver plan. Pt verbalizes understanding of the transplant process and wanting to proceed. SW provided contact information and encouraged Pt to contact SW with questions, concerns, resources and for support. Per this assessment, I did not identify any barriers to this patient moving forward with transplant      Important Information:   - Juvenal would be interested in ryan applications.      Follow up Planned:   Initiate financial resources  Psychosocial support    CITLALLI Rosario, [facility]  Pager: [phone]  Phone: [phone]

## 2020-07-21 NOTE — LETTER
7/21/2020         RE: Juvenal Blake  [address] [place] [address]        Dear Colleague,    Thank you for referring your patient, Juvenal Blake, to the [facility] BLOOD AND MARROW TRANSPLANT. Please see a copy of my visit note below.    CLINICAL SOCIAL WORK   PSYCHOSOCIAL ASSESSMENT  BLOOD AND MARROW TRANSPLANT SERVICE      Assessment completed on July 21, 2020 of living situation, support system, financial status, functional status, coping, stressors, need for resources and social work intervention provided as needed.  Information for this assessment was provided by Pt  report in addition to medical chart review and consultation with medical team.     Present at assessment: Patient, Juvenal Blake was present for this assessment conducted by KRISTEN Rosario .     Diagnosis: Non-Hodgkin's Lymphoma (NHL)    Date of Diagnosis: 2019    Transplant type: NAM NK    Donor:   Related allogeneic donor stem cell transplant  Donor: Son    Physician: Gage Corral MD    Nurse Coordinator: Hoang Stoner RN    : CITLALLI Rosario, [facility]     Permanent Address:   [address] [place] [address]      Contact Information:  Pt Home Phone: [phone]  Pt Cell Phone: [phone]  Pt Email: [email or web address]  Pt's wife Nancy's  Phone: [phone]    Presenting Information:  Juvenal is a 54 year old male diagnosed with NHL who presents for evaluation for an NAM NK at the [facility] ([facility]  The pt's wife Nancy did listen to some of the visit today.     Decision Making:   Self     Health Care Directive:   Patient considering completing the pt has a HCD at home and is working through completing this document.    Relationship Status:    to his wife Nancy. Both indicate their relationship as stable and supportive.    Special Needs: None identified at this time.     Family/Support System: Pt endorsed a good support system including family  and close friends who will be available to support Pt throughout transplant process.     Spouse: Nancy Blake    Children: 2 biological son's and 2 step-son's Juvenal Pascal Andrew, Josh    Grandchildren: 4 grandchildren Mikayla and Stevie are living with the pt    Parents:     Siblings: 3 siblings, 1 brother and 2 sisters    Friends: some close friends and co-workers    Caregiver: SW discussed with pt the caregiver role and expectation at length. Pt is agreeable to having a full time caregiver for a minimum of 30 days until cleared by the BMT physician. Pt's identified caregivers are his wife and son's. Pt signed the caregiver contract which will be scanned into the EMR. Caregiver education and resources provided. No caregiver concerns identified. Pt and Pt's wife confirmed understanding caregiving requirement, including driving restrictions, as discussed during psychosocial assessment.     Name & Numbers  Nancy Blake [phone]    Transportation Mode:  Private Car . Pt is aware of driving restrictions post-BMT and the need for the caregiver is to drive until cleared to drive by the  BMT physician. SW provided information on parking info and monthly parking pass options. Pt will utilize the [employer] security [employer] for transportation to and from the [facility] and BMT Clinic/Hospital.    Insurance:  No Insurance issues identified.Juvenal did share that he will either have to start paying for his insurance soon or will be switching to Nancy's depending on the price. Both work for the same employer so they are trying to figure out what is the best financial decision.  Pt denied specific insurance concerns at this time. BERNIE reiterated information about the BMT [employer]  should specific insurance questions arise as Pt moves through transplant process.     Sources of Income:  Income concerns identified  The pt has stopped working at this time and has no income. He does have LTD through his job, but he  needs to be out of work for 3 months prior for it to start. The pt did ask about applying for Social Security Disability and CSW did explain how this process works. CSW also spoke with the pt about ryan options available and these applications were left for the pt in the clinic per his request.  Pt denied anticipation of financial hardship related to BMT at this time.  SW encouraged Pt to contact this SW for additional potential resources should financial situation change.     Employment:   Employer:  [employer]  Position:   Last Day of Work: 7/15/20     Spouse's Employment:  Employer:  [employer]      Mental Health: Mental health symptoms currently identified       PHQ-9 assessment, score was 4 ,which indicates no current signs of depression.  GAD7 assessment, score was 11, which indicates moderate signs of anxiety.     We talked about how some patients may see an increase in feelings of anxiety or depression while hospitalized for extended periods along with isolation. Encouraged Juvenal and Nancy to let us know if they are noticing an increase in symptoms. We talked about the variety of modalities available to use as coping mechanisms (including but not limited to guided imagery, relaxation techniques, progressive muscle relaxation, counseling/talk therapy and medication).    Juvenal discussed that he is having an increase in his anxiety levels as he prepared for the BMT process. Overall, Juvenal notes moments of his mind wondering and change sin his mood. Juvenal shared that he has not done anything to change his anxiety, but feels that once he is in the hospital he will feel better that things are starting. Juvenal does note to smoking marijuana for nausea, but does think that it might help his anxiety as well. CSW discussed coping skills to help with his anxiety, but Juvenal feels things are tolerable for him at this time.     Chemical Use: Chemical use identified. Juvenal notes that he  "drinks alcohol daily- (3-4 at a time) and uses marijuana daily. Juvenal notes that the marijuana use is to help with his nausea. Discussed the need to abstain from these during the BMT process and the pt expressed understanding.  Update was provided to Dr. Corral.      Trauma/Loss/Abuse History: Multiple losses associated with cancer diagnosis and treatment, including health, employment, changes to physical appearance, etc.     Spirituality:  Patient does not identify with martha community.  Wife is Islam and at times attend with her, but was raised Yazidi.     Coping: Pt noted that he is currently feeling \"nervous and ready to begin\".  Pt shared that his main coping mechanisms are being outside and talking to his wife.   SW and Pt discussed additional positive coping mechanisms that Pt can utilize while in the hospital.     Caregiver Coping: Not assessed during this process due to Nancy not being available.     Education Provided: Transplant process expectations, Caregiver requirements, Caregiver self-care, Financial issues related to transplant, Financial resources/grants available, Common psychosocial stressors pre/post transplant, Support group(s) available, Tour/layout of the inpatient unit/non-use of cell phones, Hospital resources available, Web site information, Resources for transplant patients and their families as well as the Clinical Social Work role.     Interventions Provided: Supportive counseling and education     Recreation/Leisure Activities:  Playing pool and gardening    Plans for Hospital Stay Leisure:  Watch movies    Assessment and Recommendations for Team:  Pt is a 54 year old male diagnosed with NHL who is here undergoing preparation for a planned NAM NK transplant.     Pt is a pleasant and well articulate male who feels comfortable communicating with the medical team. Pt has a good support team who are involved.     Pt may benefit from ongoing psychosocial support in regards to coping " with the adjustment to the BMT process. Pt's family may benefit from ongoing psychosocial support in regards to coping with the adjustment to the BMT process and may also benefit from attending the BMT Caregiver Support Group that meets weekly on the inpatient unit.     Pt has a good support system and a good caregiver plan. Pt verbalizes understanding of the transplant process and wanting to proceed. SW provided contact information and encouraged Pt to contact SW with questions, concerns, resources and for support. Per this assessment, I did not identify any barriers to this patient moving forward with transplant      Important Information:   - Juvenal would be interested in ryan applications.      Follow up Planned:   Initiate financial resources  Psychosocial support    CITLALLI Rosario, [facility]  Pager: [phone]  Phone: [phone]                Again, thank you for allowing me to participate in the care of your patient.        Sincerely,        FELTON Johnson

## 2020-07-22 ENCOUNTER — HOSPITAL ENCOUNTER (OUTPATIENT)
Dept: PET IMAGING | Facility: CLINIC | Age: 55
Setting detail: NUCLEAR MEDICINE
End: 2020-07-22
Attending: INTERNAL MEDICINE
Payer: COMMERCIAL

## 2020-07-22 ENCOUNTER — OFFICE VISIT (OUTPATIENT)
Dept: TRANSPLANT | Facility: CLINIC | Age: 55
End: 2020-07-22
Attending: PHYSICIAN ASSISTANT
Payer: COMMERCIAL

## 2020-07-22 VITALS
BODY MASS INDEX: 35.01 KG/M2 | DIASTOLIC BLOOD PRESSURE: 78 MMHG | RESPIRATION RATE: 18 BRPM | SYSTOLIC BLOOD PRESSURE: 127 MMHG | WEIGHT: 244 LBS | HEART RATE: 65 BPM | OXYGEN SATURATION: 100 % | TEMPERATURE: 98.6 F

## 2020-07-22 DIAGNOSIS — Z86.2 PERSONAL HISTORY OF DISEASES OF BLOOD AND BLOOD-FORMING ORGANS: ICD-10-CM

## 2020-07-22 DIAGNOSIS — C82.08 FOLLICULAR LYMPHOMA GRADE I, LYMPH NODES OF MULTIPLE SITES (H): ICD-10-CM

## 2020-07-22 DIAGNOSIS — C82.90 FOLLICULAR LYMPHOMA (H): ICD-10-CM

## 2020-07-22 DIAGNOSIS — C85.90 LYMPHOMA (H): ICD-10-CM

## 2020-07-22 LAB
APTT PPP: 27 SEC (ref 22–37)
BASOPHILS # BLD AUTO: 0 10E9/L (ref 0–0.2)
BASOPHILS NFR BLD AUTO: 0.5 %
DIFFERENTIAL METHOD BLD: ABNORMAL
EOSINOPHIL # BLD AUTO: 0.1 10E9/L (ref 0–0.7)
EOSINOPHIL NFR BLD AUTO: 2.2 %
ERYTHROCYTE [DISTWIDTH] IN BLOOD BY AUTOMATED COUNT: 14.3 % (ref 10–15)
HCT VFR BLD AUTO: 39.7 % (ref 40–53)
HGB BLD-MCNC: 13.2 G/DL (ref 13.3–17.7)
IMM GRANULOCYTES # BLD: 0 10E9/L (ref 0–0.4)
IMM GRANULOCYTES NFR BLD: 0.5 %
LYMPHOCYTES # BLD AUTO: 0.4 10E9/L (ref 0.8–5.3)
LYMPHOCYTES NFR BLD AUTO: 7.3 %
MCH RBC QN AUTO: 30.5 PG (ref 26.5–33)
MCHC RBC AUTO-ENTMCNC: 33.2 G/DL (ref 31.5–36.5)
MCV RBC AUTO: 92 FL (ref 78–100)
MONOCYTES # BLD AUTO: 0.6 10E9/L (ref 0–1.3)
MONOCYTES NFR BLD AUTO: 10 %
NEUTROPHILS # BLD AUTO: 4.8 10E9/L (ref 1.6–8.3)
NEUTROPHILS NFR BLD AUTO: 79.5 %
NRBC # BLD AUTO: 0 10*3/UL
NRBC BLD AUTO-RTO: 0 /100
PLATELET # BLD AUTO: 152 10E9/L (ref 150–450)
RBC # BLD AUTO: 4.33 10E12/L (ref 4.4–5.9)
WBC # BLD AUTO: 6 10E9/L (ref 4–11)

## 2020-07-22 PROCEDURE — 25000128 H RX IP 250 OP 636: Performed by: INTERNAL MEDICINE

## 2020-07-22 PROCEDURE — 88271 CYTOGENETICS DNA PROBE: CPT | Performed by: INTERNAL MEDICINE

## 2020-07-22 PROCEDURE — 40001004 ZZHCL STATISTIC FLOW INT 9-15 ABY TC 88188: Performed by: INTERNAL MEDICINE

## 2020-07-22 PROCEDURE — 88313 SPECIAL STAINS GROUP 2: CPT | Performed by: INTERNAL MEDICINE

## 2020-07-22 PROCEDURE — 88341 IMHCHEM/IMCYTCHM EA ADD ANTB: CPT | Performed by: INTERNAL MEDICINE

## 2020-07-22 PROCEDURE — 88184 FLOWCYTOMETRY/ TC 1 MARKER: CPT | Performed by: INTERNAL MEDICINE

## 2020-07-22 PROCEDURE — 88161 CYTOPATH SMEAR OTHER SOURCE: CPT | Mod: XU | Performed by: INTERNAL MEDICINE

## 2020-07-22 PROCEDURE — 25000128 H RX IP 250 OP 636: Mod: ZF | Performed by: PHYSICIAN ASSISTANT

## 2020-07-22 PROCEDURE — 88264 CHROMOSOME ANALYSIS 20-25: CPT | Performed by: INTERNAL MEDICINE

## 2020-07-22 PROCEDURE — 88280 CHROMOSOME KARYOTYPE STUDY: CPT | Performed by: INTERNAL MEDICINE

## 2020-07-22 PROCEDURE — 74177 CT ABD & PELVIS W/CONTRAST: CPT | Mod: PS

## 2020-07-22 PROCEDURE — 88275 CYTOGENETICS 100-300: CPT | Performed by: INTERNAL MEDICINE

## 2020-07-22 PROCEDURE — 00000161 ZZHCL STATISTIC H-SPHEME PROCESS B/S: Performed by: INTERNAL MEDICINE

## 2020-07-22 PROCEDURE — 88305 TISSUE EXAM BY PATHOLOGIST: CPT | Performed by: INTERNAL MEDICINE

## 2020-07-22 PROCEDURE — 85730 THROMBOPLASTIN TIME PARTIAL: CPT | Performed by: INTERNAL MEDICINE

## 2020-07-22 PROCEDURE — 85025 COMPLETE CBC W/AUTO DIFF WBC: CPT | Performed by: INTERNAL MEDICINE

## 2020-07-22 PROCEDURE — 88311 DECALCIFY TISSUE: CPT | Performed by: INTERNAL MEDICINE

## 2020-07-22 PROCEDURE — 38222 DX BONE MARROW BX & ASPIR: CPT | Mod: ZF

## 2020-07-22 PROCEDURE — 88185 FLOWCYTOMETRY/TC ADD-ON: CPT | Performed by: INTERNAL MEDICINE

## 2020-07-22 PROCEDURE — 88237 TISSUE CULTURE BONE MARROW: CPT | Performed by: INTERNAL MEDICINE

## 2020-07-22 PROCEDURE — 40000611 ZZHCL STATISTIC MORPHOLOGY W/INTERP HEMEPATH TC 85060: Performed by: INTERNAL MEDICINE

## 2020-07-22 PROCEDURE — 40000795 ZZHCL STATISTIC DNA PROCESS AND HOLD: Performed by: INTERNAL MEDICINE

## 2020-07-22 PROCEDURE — A9552 F18 FDG: HCPCS | Performed by: INTERNAL MEDICINE

## 2020-07-22 PROCEDURE — 34300033 ZZH RX 343: Performed by: INTERNAL MEDICINE

## 2020-07-22 PROCEDURE — 88342 IMHCHEM/IMCYTCHM 1ST ANTB: CPT | Mod: XU | Performed by: INTERNAL MEDICINE

## 2020-07-22 PROCEDURE — 40000951 ZZHCL STATISTIC BONE MARROW INTERP TC 85097: Performed by: INTERNAL MEDICINE

## 2020-07-22 RX ORDER — IOPAMIDOL 755 MG/ML
20-135 INJECTION, SOLUTION INTRAVASCULAR ONCE
Status: COMPLETED | OUTPATIENT
Start: 2020-07-22 | End: 2020-07-22

## 2020-07-22 RX ORDER — HYDROXYZINE PAMOATE 50 MG/1
50 CAPSULE ORAL PRN
Status: ON HOLD | COMMUNITY
Start: 2020-06-19 | End: 2020-09-04

## 2020-07-22 RX ORDER — HEPARIN SODIUM (PORCINE) LOCK FLUSH IV SOLN 100 UNIT/ML 100 UNIT/ML
5 SOLUTION INTRAVENOUS ONCE
Status: COMPLETED | OUTPATIENT
Start: 2020-07-22 | End: 2020-07-22

## 2020-07-22 RX ADMIN — Medication 5 ML: at 12:49

## 2020-07-22 RX ADMIN — IOPAMIDOL 135 ML: 755 INJECTION, SOLUTION INTRAVENOUS at 08:37

## 2020-07-22 RX ADMIN — MIDAZOLAM HYDROCHLORIDE 2 MG: 2 INJECTION, SOLUTION INTRAMUSCULAR; INTRAVENOUS at 12:48

## 2020-07-22 RX ADMIN — FLUDEOXYGLUCOSE F-18 14.44 MCI.: 500 INJECTION, SOLUTION INTRAVENOUS at 08:37

## 2020-07-22 ASSESSMENT — PAIN SCALES - GENERAL: PAINLEVEL: NO PAIN (0)

## 2020-07-22 ASSESSMENT — PATIENT HEALTH QUESTIONNAIRE - PHQ9: SUM OF ALL RESPONSES TO PHQ QUESTIONS 1-9: 4

## 2020-07-22 ASSESSMENT — ANXIETY QUESTIONNAIRES: GAD7 TOTAL SCORE: 11

## 2020-07-22 NOTE — TELEPHONE ENCOUNTER
RECORDS RECEIVED FROM: Internal/Care Everywhere   DATE RECEIVED: 7-28   NOTES STATUS DETAILS   OFFICE NOTE from referring provider    Internal BMT   OFFICE NOTE from other cardiologist    N/A    DISCHARGE SUMMARY from hospital    N/A    DISCHARGE REPORT from the ER   N/A    OPERATIVE REPORT    N/A    MEDICATION LIST   Internal    LABS     BMP   Care Everywhere 3-12-20   CBC   Internal 7-22-20   CMP   Internal 7-16-20   Lipids   N/A    TSH   N/A    DIAGNOSTIC PROCEDURES     EKG   Internal 7-20-20   Monitor Reports   N/A    IMAGING (DISC & REPORT)      Echo   Internal 7-20-20   Stress Tests   N/A    Cath   N/A    MRI/MRA   N/A    CT/CTA   In process 4-6-20     Action    Action Taken 7-22: Requested from HE:    CT Chest   4-6-20, 1-6-20, 11-21-19, 4-8-19 7-24: confirmed CTs are in PACS

## 2020-07-22 NOTE — PROGRESS NOTES
BMT ONC Adult Bone Marrow Biopsy Procedure Note  July 22, 2020  There were no vitals taken for this visit.     Learning needs assessment complete within 12 months? YES    DIAGNOSIS: Follicular lymphoma     PROCEDURE: Unilateral Bone Marrow Biopsy and Unilateral Aspirate    [place] [facility] [place] [facility] [place]    Patient s identification was positively verified by verbal identification and invasive procedure safety checklist was completed. Informed consent was obtained. Following the administration of Midazolam as pre-medication, patient was placed in the prone position and prepped and draped in a sterile manner. Approximately 15 cc of 1% Lidocaine was used over the left posterior iliac spine. Following this a 3 mm incision was made. Trephine bone marrow core(s) was (were) obtained from the [facility] (one additional core for research). Bone marrow aspirates were obtained from the [facility]. Aspirates were sent for morphology, immunophenotyping, cytogenetics, molecular diagnostics, and research. A total of approximately 55 ml of marrow was aspirated. Following this procedure a sterile dressing was applied to the bone marrow biopsy site(s). The patient was placed in the supine position to maintain pressure on the biopsy site. Post-procedure wound care instructions were given.     Complications: NO    Pre-procedural pain: 0 out of 10 on the numeric pain rating scale.     Procedural pain: 3 out of 10 on the numeric pain rating scale.     Post-procedural pain assessment: 0 out of 10 on the numeric pain rating scale.     Interventions: NO    Length of procedure:20 minutes or less      Procedure performed by: Topher Haro PA-C

## 2020-07-22 NOTE — LETTER
7/22/2020         RE: Juvenal Blake  [address] [place] [address]        Dear Colleague,    Thank you for referring your patient, Juvenal Blake, to the [facility] BLOOD AND MARROW TRANSPLANT. Please see a copy of my visit note below.    BMT ONC Adult Bone Marrow Biopsy Procedure Note  July 22, 2020  There were no vitals taken for this visit.     Learning needs assessment complete within 12 months? YES    DIAGNOSIS: Follicular lymphoma     PROCEDURE: Unilateral Bone Marrow Biopsy and Unilateral Aspirate    [place] [facility] [place] [facility] [place]    Patient s identification was positively verified by verbal identification and invasive procedure safety checklist was completed. Informed consent was obtained. Following the administration of Midazolam as pre-medication, patient was placed in the prone position and prepped and draped in a sterile manner. Approximately 15 cc of 1% Lidocaine was used over the left posterior iliac spine. Following this a 3 mm incision was made. Trephine bone marrow core(s) was (were) obtained from the [facility] (one additional core for research). Bone marrow aspirates were obtained from the [facility]. Aspirates were sent for morphology, immunophenotyping, cytogenetics, molecular diagnostics, and research. A total of approximately 55 ml of marrow was aspirated. Following this procedure a sterile dressing was applied to the bone marrow biopsy site(s). The patient was placed in the supine position to maintain pressure on the biopsy site. Post-procedure wound care instructions were given.     Complications: NO    Pre-procedural pain: 0 out of 10 on the numeric pain rating scale.     Procedural pain: 3 out of 10 on the numeric pain rating scale.     Post-procedural pain assessment: 0 out of 10 on the numeric pain rating scale.     Interventions: NO    Length of procedure:20 minutes or less      Procedure performed by: Topher Haro PA-C        Again, thank you for allowing me to participate in the care of  your patient.        Sincerely,        [facility] MARROW [facility]

## 2020-07-23 LAB
COPATH REPORT: NORMAL

## 2020-07-27 DIAGNOSIS — C82.08 FOLLICULAR LYMPHOMA GRADE I, LYMPH NODES OF MULTIPLE SITES (H): ICD-10-CM

## 2020-07-28 ENCOUNTER — OFFICE VISIT (OUTPATIENT)
Dept: CARDIOLOGY | Facility: CLINIC | Age: 55
End: 2020-07-28
Attending: INTERNAL MEDICINE
Payer: COMMERCIAL

## 2020-07-28 ENCOUNTER — RECORDS - HEALTHEAST (OUTPATIENT)
Dept: ADMINISTRATIVE | Facility: OTHER | Age: 55
End: 2020-07-28

## 2020-07-28 ENCOUNTER — PRE VISIT (OUTPATIENT)
Dept: CARDIOLOGY | Facility: CLINIC | Age: 55
End: 2020-07-28

## 2020-07-28 VITALS
HEIGHT: 68 IN | BODY MASS INDEX: 36.53 KG/M2 | SYSTOLIC BLOOD PRESSURE: 121 MMHG | OXYGEN SATURATION: 97 % | HEART RATE: 68 BPM | DIASTOLIC BLOOD PRESSURE: 80 MMHG | WEIGHT: 241 LBS

## 2020-07-28 DIAGNOSIS — R06.09 DYSPNEA ON EXERTION: ICD-10-CM

## 2020-07-28 DIAGNOSIS — Z01.818 PREOPERATIVE EXAMINATION: Primary | ICD-10-CM

## 2020-07-28 DIAGNOSIS — I20.89 STABLE ANGINA PECTORIS (H): ICD-10-CM

## 2020-07-28 DIAGNOSIS — C82.08 FOLLICULAR LYMPHOMA GRADE I, LYMPH NODES OF MULTIPLE SITES (H): ICD-10-CM

## 2020-07-28 LAB
DLCOCOR-%PRED-PRE: 110 %
DLCOCOR-PRE: 31.25 ML/MIN/MMHG
DLCOUNC-%PRED-PRE: 105 %
DLCOUNC-PRE: 29.94 ML/MIN/MMHG
DLCOUNC-PRED: 28.33 ML/MIN/MMHG
ERV-%PRED-PRE: 42 %
ERV-PRE: 0.37 L
ERV-PRED: 0.88 L
EXPTIME-PRE: 7.61 SEC
FEF2575-%PRED-PRE: 127 %
FEF2575-PRE: 4.12 L/SEC
FEF2575-PRED: 3.23 L/SEC
FEFMAX-%PRED-PRE: 78 %
FEFMAX-PRE: 7.41 L/SEC
FEFMAX-PRED: 9.5 L/SEC
FEV1-%PRED-PRE: 86 %
FEV1-PRE: 3.24 L
FEV1FEV6-PRE: 83 %
FEV1FEV6-PRED: 80 %
FEV1FVC-PRE: 82 %
FEV1FVC-PRED: 78 %
FEV1SVC-PRE: 74 %
FEV1SVC-PRED: 73 %
FIFMAX-PRE: 5.46 L/SEC
FRCPLETH-%PRED-PRE: 87 %
FRCPLETH-PRE: 3.14 L
FRCPLETH-PRED: 3.57 L
FVC-%PRED-PRE: 82 %
FVC-PRE: 3.95 L
FVC-PRED: 4.79 L
IC-%PRED-PRE: 96 %
IC-PRE: 4.04 L
IC-PRED: 4.21 L
RVPLETH-%PRED-PRE: 118 %
RVPLETH-PRE: 2.77 L
RVPLETH-PRED: 2.33 L
TLCPLETH-%PRED-PRE: 100 %
TLCPLETH-PRE: 7.18 L
TLCPLETH-PRED: 7.13 L
VA-%PRED-PRE: 95 %
VA-PRE: 6.3 L
VC-%PRED-PRE: 86 %
VC-PRE: 4.41 L
VC-PRED: 5.08 L

## 2020-07-28 PROCEDURE — G0463 HOSPITAL OUTPT CLINIC VISIT: HCPCS | Mod: ZF

## 2020-07-28 PROCEDURE — 99204 OFFICE O/P NEW MOD 45 MIN: CPT | Mod: ZP | Performed by: INTERNAL MEDICINE

## 2020-07-28 ASSESSMENT — PAIN SCALES - GENERAL: PAINLEVEL: NO PAIN (0)

## 2020-07-28 ASSESSMENT — MIFFLIN-ST. JEOR: SCORE: 1897.67

## 2020-07-28 NOTE — NURSING NOTE
Cardiac Testing: Patient given instructions regarding  stress echocardiogram . Discussed purpose, preparation, procedure and when to expect results reported back to the patient. Patient demonstrated understanding of this information and agreed to call with further questions or concerns.  Return Appointment: Patient given instructions regarding scheduling next clinic visit. Patient demonstrated understanding of this information and agreed to call with further questions or concerns.  Patient stated he understood all health information given and agreed to call with further questions or concerns.

## 2020-07-28 NOTE — LETTER
7/28/2020    RE: Juvenal Blake  [address] [place] [address]       Dear Colleague,    Thank you for the opportunity to participate in the care of your patient, Juvenal Blake, at the [facility] at [facility]. Please see a copy of my visit note below.    I am delighted to see Juvenal Blake in consultation.The primary encounter diagnosis was Preoperative examination. Diagnoses of Follicular lymphoma grade i, lymph nodes of multiple sites (H), Stable angina pectoris (H), and Dyspnea on exertion were also pertinent to this visit.   As you know, the patient is a 54 year old  male. He   has a past medical history of Cancer (H), Non Hodgkin's lymphoma (H), and Shortness of breath..    On this visit, the patient states that he has chest pain with exercise relieved by rest.  This is accompanied by dyspnea on exertion.  The patient denies palpitations, near-syncope, syncope, orthopnea and paroxysmal nocturnal dyspnea.    The patient's cardiovascular risk factors include smoker.    The following portions of the patient's history were reviewed and updated as appropriate: allergies, current medications, past family history, past medical history, past social history, past surgical history, and the problem list.    PMH: The patient's past medical history includes:    Past Medical History:   Diagnosis Date     Cancer (H)      Non Hodgkin's lymphoma (H)      Shortness of breath       Past Surgical History:   Procedure Laterality Date     HERNIA REPAIR, UMBILICAL         The patient's medications as of the current encounter are:     Current Outpatient Medications   Medication Sig Dispense Refill     albuterol (PROAIR HFA/PROVENTIL HFA/VENTOLIN HFA) 108 (90 Base) MCG/ACT inhaler Inhale 2 puffs into the lungs       azelastine (ASTELIN) 0.1 % nasal spray Spray 1 spray in nostril       beclomethasone (QVAR) 80 MCG/ACT AERS IS A DISCONTINUED MEDICATION Inhale 1 puff  into the lungs       beclomethasone HFA (QVAR REDIHALER) 40 MCG/ACT inhaler        fluticasone (FLONASE) 50 MCG/ACT nasal spray        fluticasone (VERAMYST) 27.5 MCG/SPRAY nasal spray        hydrOXYzine (VISTARIL) 50 MG capsule Take 50 mg by mouth as needed       medical cannabis (Patient's own supply)        Multiple Vitamin (DAILY-ROSARIO) TABS 1 tab(s)       naproxen (NAPROSYN) 500 MG tablet 1 tab(s)       omeprazole (PRILOSEC) 20 MG DR capsule 1 cap(s)       Psyllium (METAMUCIL FIBER) 51.7 % PACK Take 1 packet by mouth       sildenafil (VIAGRA) 25 MG tablet Take 25 mg by mouth       tolterodine ER (DETROL LA) 4 MG 24 hr capsule Take 4 mg by mouth         Labs:     Orders Only on 07/27/2020   Component Date Value Ref Range Status     FVC-Pred 07/27/2020 4.79  L Preliminary     FVC-Pre 07/27/2020 3.95  L Preliminary     FVC-%Pred-Pre 07/27/2020 82  % Preliminary     FEV1-Pre 07/27/2020 3.24  L Preliminary     FEV1-%Pred-Pre 07/27/2020 86  % Preliminary     FEV1FVC-Pred 07/27/2020 78  % Preliminary     FEV1FVC-Pre 07/27/2020 82  % Preliminary     FEFMax-Pred 07/27/2020 9.50  L/sec Preliminary     FEFMax-Pre 07/27/2020 7.41  L/sec Preliminary     FEFMax-%Pred-Pre 07/27/2020 78  % Preliminary     FEF2575-Pred 07/27/2020 3.23  L/sec Preliminary     FEF2575-Pre 07/27/2020 4.12  L/sec Preliminary     JIG2508-%Pred-Pre 07/27/2020 127  % Preliminary     ExpTime-Pre 07/27/2020 7.61  sec Preliminary     FIFMax-Pre 07/27/2020 5.46  L/sec Preliminary     VC-Pred 07/27/2020 5.08  L Preliminary     VC-Pre 07/27/2020 4.41  L Preliminary     VC-%Pred-Pre 07/27/2020 86  % Preliminary     IC-Pred 07/27/2020 4.21  L Preliminary     IC-Pre 07/27/2020 4.04  L Preliminary     IC-%Pred-Pre 07/27/2020 96  % Preliminary     ERV-Pred 07/27/2020 0.88  L Preliminary     ERV-Pre 07/27/2020 0.37  L Preliminary     ERV-%Pred-Pre 07/27/2020 42  % Preliminary     FEV1FEV6-Pred 07/27/2020 80  % Preliminary     FEV1FEV6-Pre 07/27/2020 83  %  Preliminary     FRCPleth-Pred 07/27/2020 3.57  L Preliminary     FRCPleth-Pre 07/27/2020 3.14  L Preliminary     FRCPleth-%Pred-Pre 07/27/2020 87  % Preliminary     RVPleth-Pred 07/27/2020 2.33  L Preliminary     RVPleth-Pre 07/27/2020 2.77  L Preliminary     RVPleth-%Pred-Pre 07/27/2020 118  % Preliminary     TLCPleth-Pred 07/27/2020 7.13  L Preliminary     TLCPleth-Pre 07/27/2020 7.18  L Preliminary     TLCPleth-%Pred-Pre 07/27/2020 100  % Preliminary     DLCOunc-Pred 07/27/2020 28.33  ml/min/mmHg Preliminary     DLCOunc-Pre 07/27/2020 29.94  ml/min/mmHg Preliminary     DLCOunc-%Pred-Pre 07/27/2020 105  % Preliminary     DLCOcor-Pre 07/27/2020 31.25  ml/min/mmHg Preliminary     DLCOcor-%Pred-Pre 07/27/2020 110  % Preliminary     VA-Pre 07/27/2020 6.30  L Preliminary     VA-%Pred-Pre 07/27/2020 95  % Preliminary     FEV1SVC-Pred 07/27/2020 73  % Preliminary     FEV1SVC-Pre 07/27/2020 74  % Preliminary       Allergies:    Allergies   Allergen Reactions     Ondansetron Other (See Comments) and Itching       Family History:   Family History   Problem Relation Age of Onset     Colon Cancer Mother      Lymphoma Father      No Known Problems Brother      No Known Problems Sister      No Known Problems Son      No Known Problems Sister      No Known Problems Son        Psychosocial history:  reports that he quit smoking about 25 years ago. He has never used smokeless tobacco. He reports current alcohol use. He reports current drug use. Drug: Marijuana.    Review of systems: positive for exertional chest pain or pressure and dyspnea on exertion    In addition,   General: No change in weight, sleep or appetite.  Normal energy.  No fever or chills  Eyes: Negative for vision changes or eye problems  ENT: No problems with ears, nose or throat.  No difficulty swallowing.  Resp: No coughing, wheezing or shortness of breath  GI: No nausea, vomiting,  heartburn, abdominal pain, diarrhea, constipation or change in bowel habits  :  "No urinary frequency or dysuria, bladder or kidney problems  Musculoskeletal: No significant muscle or joint pains  Neurologic: No headaches, numbness, tingling, weakness, problems with balance or coordination  Psychiatric: No problems with anxiety, depression or mental health  Heme/immune/allergy: non Hodgkin's lymphoma  Endocrine: No history of thyroid disease, diabetes or other endocrine disorders  Skin: No rashes,worrisome lesions or skin problems  Vascular:  No claudication, lifestyle limiting or otherwise; no ischemic rest pain; no non-healing ulcers. No weakness, No loss of sensation        Physical examination  Vitals: /80 (BP Location: Right arm, Patient Position: Chair, Cuff Size: Adult Large)   Pulse 68   Ht 1.727 m (5' 8\")   Wt 109.3 kg (241 lb)   SpO2 97%   BMI 36.64 kg/m    BMI= Body mass index is 36.64 kg/m .    In general, the patient is a pleasant male in no apparent distress.    HEENT: Normiocephalic and atraumatic.  PERRLA.  EOMI.  Sclerae white, not injected.  Nares clear.  Pharynx without erythema or exudate.  Dentition intact.    Neck: No adenopathy.  No thyromegaly. Carotids +2/2 bilaterally without bruits.  No jugular venous distension.   Heart:  The PMI is in the 5th ICS in the midclavicular line. There is no heave. Regular rate and rhythm. Normal S1, S2 splits physiologically. No murmur, rub, click, or gallop.    Lungs: Clear to asculation.  No ronchi, wheezes, rales.  No dullness to percussion.   Abdomen: Soft, nontender, nondistended. No organomegaly. No AAA.  No bruits.   Extremities: No clubbing, cyanosis, or edema. The pulses were intact bilaterally.   Neurological: The neurological examination reveal a patient who was oriented to person, place, and time.  The remainder of the examination was nonfocal.    Cardiac tests include:    Echo: normal EF, no regional wall motion defects  ECG: normal sinus rhythm, leftward axis, left ventricular hypertrophy with strain    Assessment " and Plan    1. Stable angina - plan stress test  2. BMT preop - will eval with stress test      The patient is to return  3 months. The patient understood the treatment plan as outlined above.  There were no barriers to learning.      Cliff Silva MD

## 2020-07-28 NOTE — PATIENT INSTRUCTIONS
"You were seen today in the Cardiovascular Clinic at the [facility].     Cardiology Providers you saw during your visit: Michael Silva MD     Diagnosis:   Encounter Diagnoses   Name Primary?     Follicular lymphoma grade i, lymph nodes of multiple sites (H)      Preoperative examination Yes     Stable angina pectoris (H)      Dyspnea on exertion         Orders:   Orders Placed This Encounter   Procedures     Follow-Up with Cardiac Advanced Practice Provider     Exercise Stress Echocardiogram       Current Medication List  Current Outpatient Medications   Medication Sig Dispense Refill     albuterol (PROAIR HFA/PROVENTIL HFA/VENTOLIN HFA) 108 (90 Base) MCG/ACT inhaler Inhale 2 puffs into the lungs       azelastine (ASTELIN) 0.1 % nasal spray Spray 1 spray in nostril       beclomethasone (QVAR) 80 MCG/ACT AERS IS A DISCONTINUED MEDICATION Inhale 1 puff into the lungs       beclomethasone HFA (QVAR REDIHALER) 40 MCG/ACT inhaler        fluticasone (FLONASE) 50 MCG/ACT nasal spray        fluticasone (VERAMYST) 27.5 MCG/SPRAY nasal spray        hydrOXYzine (VISTARIL) 50 MG capsule Take 50 mg by mouth as needed       medical cannabis (Patient's own supply)        Multiple Vitamin (DAILY-ROSARIO) TABS 1 tab(s)       naproxen (NAPROSYN) 500 MG tablet 1 tab(s)       omeprazole (PRILOSEC) 20 MG DR capsule 1 cap(s)       Psyllium (METAMUCIL FIBER) 51.7 % PACK Take 1 packet by mouth       sildenafil (VIAGRA) 25 MG tablet Take 25 mg by mouth       tolterodine ER (DETROL LA) 4 MG 24 hr capsule Take 4 mg by mouth         Recommendations:   1. follow up in 3 months with NAYA for angina.    2. exercise stress echo to evaluate chest pain    Follow up with Provider - NAYA in 3 Months         Please feel free to call me with any questions or concerns.    Nadine Connor LPN     Questions: [phone].   First press #1 for the [facility] for \"Medical Questions\" to reach us Cardiology Nurses.     [phone]. "   First press #1 for the University and then press #1     On Call Cardiologist for after hours or on weekends: [phone]   option #4 and ask to speak to the on-call Cardiologist.          If you need a medication refill please contact your pharmacy.  Please allow 3 business days for your refill to be completed.  ________________________________________________________________________________________________________________________________  Exercise Stress Test:    A.  Do not eat or drink 3 hours prior to the test  B.  No caffeine, alcohol or smoking 12 hours prior to the test  C. Wear comfortable clothes.  D. Take your usual morning medications with a sip of water.    - Beta blocker taper. Do not take the day before your test or the day of.        If you have further questions, please utilize [employer] to contact us.   If your question concerns the above instructions, contact:  Nadine Connor LPN  Nurse Care Coordinator- [facility]  [phone]    If your question concerns the schedule/appointment times, contact:  [phone]

## 2020-07-28 NOTE — PROGRESS NOTES
I am delighted to see Juvenal MCGRAW Hayden in consultation.The primary encounter diagnosis was Preoperative examination. Diagnoses of Follicular lymphoma grade i, lymph nodes of multiple sites (H), Stable angina pectoris (H), and Dyspnea on exertion were also pertinent to this visit.   As you know, the patient is a 54 year old  male. He   has a past medical history of Cancer (H), Non Hodgkin's lymphoma (H), and Shortness of breath..    On this visit, the patient states that he has chest pain with exercise relieved by rest.  This is accompanied by dyspnea on exertion.  The patient denies palpitations, near-syncope, syncope, orthopnea and paroxysmal nocturnal dyspnea.    The patient's cardiovascular risk factors include smoker.    The following portions of the patient's history were reviewed and updated as appropriate: allergies, current medications, past family history, past medical history, past social history, past surgical history, and the problem list.    PMH: The patient's past medical history includes:    Past Medical History:   Diagnosis Date     Cancer (H)      Non Hodgkin's lymphoma (H)      Shortness of breath       Past Surgical History:   Procedure Laterality Date     HERNIA REPAIR, UMBILICAL         The patient's medications as of the current encounter are:     Current Outpatient Medications   Medication Sig Dispense Refill     albuterol (PROAIR HFA/PROVENTIL HFA/VENTOLIN HFA) 108 (90 Base) MCG/ACT inhaler Inhale 2 puffs into the lungs       azelastine (ASTELIN) 0.1 % nasal spray Spray 1 spray in nostril       beclomethasone (QVAR) 80 MCG/ACT AERS IS A DISCONTINUED MEDICATION Inhale 1 puff into the lungs       beclomethasone HFA (QVAR REDIHALER) 40 MCG/ACT inhaler        fluticasone (FLONASE) 50 MCG/ACT nasal spray        fluticasone (VERAMYST) 27.5 MCG/SPRAY nasal spray        hydrOXYzine (VISTARIL) 50 MG capsule Take 50 mg by mouth as needed       medical cannabis (Patient's own supply)         Multiple Vitamin (DAILY-ROSARIO) TABS 1 tab(s)       naproxen (NAPROSYN) 500 MG tablet 1 tab(s)       omeprazole (PRILOSEC) 20 MG DR capsule 1 cap(s)       Psyllium (METAMUCIL FIBER) 51.7 % PACK Take 1 packet by mouth       sildenafil (VIAGRA) 25 MG tablet Take 25 mg by mouth       tolterodine ER (DETROL LA) 4 MG 24 hr capsule Take 4 mg by mouth         Labs:     Orders Only on 07/27/2020   Component Date Value Ref Range Status     FVC-Pred 07/27/2020 4.79  L Preliminary     FVC-Pre 07/27/2020 3.95  L Preliminary     FVC-%Pred-Pre 07/27/2020 82  % Preliminary     FEV1-Pre 07/27/2020 3.24  L Preliminary     FEV1-%Pred-Pre 07/27/2020 86  % Preliminary     FEV1FVC-Pred 07/27/2020 78  % Preliminary     FEV1FVC-Pre 07/27/2020 82  % Preliminary     FEFMax-Pred 07/27/2020 9.50  L/sec Preliminary     FEFMax-Pre 07/27/2020 7.41  L/sec Preliminary     FEFMax-%Pred-Pre 07/27/2020 78  % Preliminary     FEF2575-Pred 07/27/2020 3.23  L/sec Preliminary     FEF2575-Pre 07/27/2020 4.12  L/sec Preliminary     UTI4238-%Pred-Pre 07/27/2020 127  % Preliminary     ExpTime-Pre 07/27/2020 7.61  sec Preliminary     FIFMax-Pre 07/27/2020 5.46  L/sec Preliminary     VC-Pred 07/27/2020 5.08  L Preliminary     VC-Pre 07/27/2020 4.41  L Preliminary     VC-%Pred-Pre 07/27/2020 86  % Preliminary     IC-Pred 07/27/2020 4.21  L Preliminary     IC-Pre 07/27/2020 4.04  L Preliminary     IC-%Pred-Pre 07/27/2020 96  % Preliminary     ERV-Pred 07/27/2020 0.88  L Preliminary     ERV-Pre 07/27/2020 0.37  L Preliminary     ERV-%Pred-Pre 07/27/2020 42  % Preliminary     FEV1FEV6-Pred 07/27/2020 80  % Preliminary     FEV1FEV6-Pre 07/27/2020 83  % Preliminary     FRCPleth-Pred 07/27/2020 3.57  L Preliminary     FRCPleth-Pre 07/27/2020 3.14  L Preliminary     FRCPleth-%Pred-Pre 07/27/2020 87  % Preliminary     RVPleth-Pred 07/27/2020 2.33  L Preliminary     RVPleth-Pre 07/27/2020 2.77  L Preliminary     RVPleth-%Pred-Pre 07/27/2020 118  % Preliminary      TLCPleth-Pred 07/27/2020 7.13  L Preliminary     TLCPleth-Pre 07/27/2020 7.18  L Preliminary     TLCPleth-%Pred-Pre 07/27/2020 100  % Preliminary     DLCOunc-Pred 07/27/2020 28.33  ml/min/mmHg Preliminary     DLCOunc-Pre 07/27/2020 29.94  ml/min/mmHg Preliminary     DLCOunc-%Pred-Pre 07/27/2020 105  % Preliminary     DLCOcor-Pre 07/27/2020 31.25  ml/min/mmHg Preliminary     DLCOcor-%Pred-Pre 07/27/2020 110  % Preliminary     VA-Pre 07/27/2020 6.30  L Preliminary     VA-%Pred-Pre 07/27/2020 95  % Preliminary     FEV1SVC-Pred 07/27/2020 73  % Preliminary     FEV1SVC-Pre 07/27/2020 74  % Preliminary       Allergies:    Allergies   Allergen Reactions     Ondansetron Other (See Comments) and Itching       Family History:   Family History   Problem Relation Age of Onset     Colon Cancer Mother      Lymphoma Father      No Known Problems Brother      No Known Problems Sister      No Known Problems Son      No Known Problems Sister      No Known Problems Son        Psychosocial history:  reports that he quit smoking about 25 years ago. He has never used smokeless tobacco. He reports current alcohol use. He reports current drug use. Drug: Marijuana.    Review of systems: positive for exertional chest pain or pressure and dyspnea on exertion    In addition,   General: No change in weight, sleep or appetite.  Normal energy.  No fever or chills  Eyes: Negative for vision changes or eye problems  ENT: No problems with ears, nose or throat.  No difficulty swallowing.  Resp: No coughing, wheezing or shortness of breath  GI: No nausea, vomiting,  heartburn, abdominal pain, diarrhea, constipation or change in bowel habits  : No urinary frequency or dysuria, bladder or kidney problems  Musculoskeletal: No significant muscle or joint pains  Neurologic: No headaches, numbness, tingling, weakness, problems with balance or coordination  Psychiatric: No problems with anxiety, depression or mental health  Heme/immune/allergy: non  "Hodgkin's lymphoma  Endocrine: No history of thyroid disease, diabetes or other endocrine disorders  Skin: No rashes,worrisome lesions or skin problems  Vascular:  No claudication, lifestyle limiting or otherwise; no ischemic rest pain; no non-healing ulcers. No weakness, No loss of sensation        Physical examination  Vitals: /80 (BP Location: Right arm, Patient Position: Chair, Cuff Size: Adult Large)   Pulse 68   Ht 1.727 m (5' 8\")   Wt 109.3 kg (241 lb)   SpO2 97%   BMI 36.64 kg/m    BMI= Body mass index is 36.64 kg/m .    In general, the patient is a pleasant male in no apparent distress.    HEENT: Normiocephalic and atraumatic.  PERRLA.  EOMI.  Sclerae white, not injected.  Nares clear.  Pharynx without erythema or exudate.  Dentition intact.    Neck: No adenopathy.  No thyromegaly. Carotids +2/2 bilaterally without bruits.  No jugular venous distension.   Heart:  The PMI is in the 5th ICS in the midclavicular line. There is no heave. Regular rate and rhythm. Normal S1, S2 splits physiologically. No murmur, rub, click, or gallop.    Lungs: Clear to asculation.  No ronchi, wheezes, rales.  No dullness to percussion.   Abdomen: Soft, nontender, nondistended. No organomegaly. No AAA.  No bruits.   Extremities: No clubbing, cyanosis, or edema. The pulses were intact bilaterally.   Neurological: The neurological examination reveal a patient who was oriented to person, place, and time.  The remainder of the examination was nonfocal.    Cardiac tests include:    Echo: normal EF, no regional wall motion defects  ECG: normal sinus rhythm, leftward axis, left ventricular hypertrophy with strain    Assessment and Plan    1. Stable angina - plan stress test  2. BMT preop - will eval with stress test      The patient is to return  3 months. The patient understood the treatment plan as outlined above.  There were no barriers to learning.      Cliff Silva MD     Stress test shows normal heart function and " no evidence of ischemia.  Therefore, she would be low risk for cardiac complications of BMT.

## 2020-07-28 NOTE — NURSING NOTE
Chief Complaint   Patient presents with     New Patient     Present for Pre-BMT patient SOB/SAMS and intermittent chest pressure/pain with activity. Will need cardiac clearance prior to moving forward with BMT.     Vitals were taken and medications were reconciled.     Irma Koenig CMA    7:43 AM

## 2020-07-29 ENCOUNTER — RECORDS - HEALTHEAST (OUTPATIENT)
Dept: ADMINISTRATIVE | Facility: OTHER | Age: 55
End: 2020-07-29

## 2020-07-30 ENCOUNTER — HOSPITAL ENCOUNTER (OUTPATIENT)
Dept: CARDIOLOGY | Facility: CLINIC | Age: 55
Discharge: HOME | End: 2020-07-30
Attending: INTERNAL MEDICINE | Admitting: INTERNAL MEDICINE
Payer: COMMERCIAL

## 2020-07-30 DIAGNOSIS — R06.09 DYSPNEA ON EXERTION: ICD-10-CM

## 2020-07-30 DIAGNOSIS — I20.89 STABLE ANGINA PECTORIS (H): ICD-10-CM

## 2020-07-30 PROCEDURE — 40000264 ECHO STRESS ECHOCARDIOGRAM

## 2020-07-30 PROCEDURE — 93350 STRESS TTE ONLY: CPT | Mod: 26 | Performed by: INTERNAL MEDICINE

## 2020-07-30 PROCEDURE — 93018 CV STRESS TEST I&R ONLY: CPT | Performed by: INTERNAL MEDICINE

## 2020-07-30 PROCEDURE — 93016 CV STRESS TEST SUPVJ ONLY: CPT | Performed by: INTERNAL MEDICINE

## 2020-07-30 PROCEDURE — 93321 DOPPLER ECHO F-UP/LMTD STD: CPT | Mod: 26 | Performed by: INTERNAL MEDICINE

## 2020-07-30 PROCEDURE — 25500064 ZZH RX 255 OP 636: Performed by: INTERNAL MEDICINE

## 2020-07-30 PROCEDURE — 93325 DOPPLER ECHO COLOR FLOW MAPG: CPT | Mod: 26 | Performed by: INTERNAL MEDICINE

## 2020-07-30 RX ADMIN — PERFLUTREN 5 ML: 6.52 INJECTION, SUSPENSION INTRAVENOUS at 10:21

## 2020-07-31 ENCOUNTER — TELEPHONE (OUTPATIENT)
Dept: TRANSPLANT | Facility: CLINIC | Age: 55
End: 2020-07-31

## 2020-07-31 NOTE — TELEPHONE ENCOUNTER
Patient was scheduled to meet with BMT physician regarding work up results today 7/31. Per Dr. Corral, patient was not able to complete his stress echo yesterday due to shortness of breath. Will likely need further testing with cardiology. Cancelled appointment for today, notified Juvenal. All questions were answered, await cardiology recommendations.

## 2020-08-04 ENCOUNTER — CARE COORDINATION (OUTPATIENT)
Dept: CARDIOLOGY | Facility: CLINIC | Age: 55
End: 2020-08-04

## 2020-08-04 DIAGNOSIS — R06.09 DYSPNEA ON EXERTION: ICD-10-CM

## 2020-08-04 DIAGNOSIS — I20.89 STABLE ANGINA PECTORIS (H): Primary | ICD-10-CM

## 2020-08-04 NOTE — PROGRESS NOTES
Spoke with patient and relayed recommendation for getting a Lexiscan Stress test done per KERRI Silva.     Reviewed preparation instructions for Lexiscan Stress Test with patient.     Provided [phone] for patient to call and get scheduled as soon as possible.       Patient states understanding and agrees to call with any questions or concerns.

## 2020-08-07 ENCOUNTER — HOSPITAL ENCOUNTER (OUTPATIENT)
Dept: NUCLEAR MEDICINE | Facility: CLINIC | Age: 55
Setting detail: NUCLEAR MEDICINE
End: 2020-08-07
Attending: INTERNAL MEDICINE
Payer: COMMERCIAL

## 2020-08-07 ENCOUNTER — HOSPITAL ENCOUNTER (OUTPATIENT)
Dept: CARDIOLOGY | Facility: CLINIC | Age: 55
End: 2020-08-07
Attending: INTERNAL MEDICINE
Payer: COMMERCIAL

## 2020-08-07 DIAGNOSIS — I20.89 STABLE ANGINA PECTORIS (H): ICD-10-CM

## 2020-08-07 DIAGNOSIS — R06.09 DYSPNEA ON EXERTION: ICD-10-CM

## 2020-08-07 LAB
CV STRESS MAX HR HE: 88
RATE PRESSURE PRODUCT: NORMAL
STRESS ECHO BASELINE DIASTOLIC HE: 72
STRESS ECHO BASELINE HR: 67
STRESS ECHO BASELINE SYSTOLIC BP: 135
STRESS ECHO CALCULATED PERCENT HR: 54 %
STRESS ECHO LAST STRESS DIASTOLIC BP: 61
STRESS ECHO LAST STRESS SYSTOLIC BP: 124
STRESS ECHO TARGET HR: 164

## 2020-08-07 PROCEDURE — 78452 HT MUSCLE IMAGE SPECT MULT: CPT

## 2020-08-07 PROCEDURE — 93017 CV STRESS TEST TRACING ONLY: CPT

## 2020-08-07 PROCEDURE — 34300033 ZZH RX 343: Performed by: INTERNAL MEDICINE

## 2020-08-07 PROCEDURE — 25000128 H RX IP 250 OP 636: Performed by: INTERNAL MEDICINE

## 2020-08-07 PROCEDURE — A9502 TC99M TETROFOSMIN: HCPCS | Performed by: INTERNAL MEDICINE

## 2020-08-07 PROCEDURE — 93016 CV STRESS TEST SUPVJ ONLY: CPT | Performed by: INTERNAL MEDICINE

## 2020-08-07 RX ORDER — AMINOPHYLLINE 25 MG/ML
50-100 INJECTION, SOLUTION INTRAVENOUS
Status: DISCONTINUED | OUTPATIENT
Start: 2020-08-07 | End: 2020-08-08 | Stop reason: HOSPADM

## 2020-08-07 RX ORDER — ALBUTEROL SULFATE 90 UG/1
2 AEROSOL, METERED RESPIRATORY (INHALATION) EVERY 5 MIN PRN
Status: DISCONTINUED | OUTPATIENT
Start: 2020-08-07 | End: 2020-08-08 | Stop reason: HOSPADM

## 2020-08-07 RX ORDER — ACYCLOVIR 200 MG/1
0-1 CAPSULE ORAL
Status: DISCONTINUED | OUTPATIENT
Start: 2020-08-07 | End: 2020-08-08 | Stop reason: HOSPADM

## 2020-08-07 RX ORDER — REGADENOSON 0.08 MG/ML
0.4 INJECTION, SOLUTION INTRAVENOUS ONCE
Status: COMPLETED | OUTPATIENT
Start: 2020-08-07 | End: 2020-08-07

## 2020-08-07 RX ADMIN — REGADENOSON 0.4 MG: 0.08 INJECTION, SOLUTION INTRAVENOUS at 11:18

## 2020-08-07 RX ADMIN — TETROFOSMIN 9.7 MCI.: 1.38 INJECTION, POWDER, LYOPHILIZED, FOR SOLUTION INTRAVENOUS at 10:28

## 2020-08-07 RX ADMIN — TETROFOSMIN 35 MCI.: 1.38 INJECTION, POWDER, LYOPHILIZED, FOR SOLUTION INTRAVENOUS at 11:20

## 2020-08-11 DIAGNOSIS — C82.08 FOLLICULAR LYMPHOMA GRADE I, LYMPH NODES OF MULTIPLE SITES (H): Primary | ICD-10-CM

## 2020-08-11 LAB — COPATH REPORT: NORMAL

## 2020-08-12 ENCOUNTER — OFFICE VISIT (OUTPATIENT)
Dept: TRANSPLANT | Facility: CLINIC | Age: 55
End: 2020-08-12
Attending: INTERNAL MEDICINE
Payer: COMMERCIAL

## 2020-08-12 ENCOUNTER — MEDICAL CORRESPONDENCE (OUTPATIENT)
Dept: TRANSPLANT | Facility: CLINIC | Age: 55
End: 2020-08-12

## 2020-08-12 ENCOUNTER — RECORDS - HEALTHEAST (OUTPATIENT)
Dept: ADMINISTRATIVE | Facility: OTHER | Age: 55
End: 2020-08-12

## 2020-08-12 VITALS
WEIGHT: 245.4 LBS | HEART RATE: 72 BPM | SYSTOLIC BLOOD PRESSURE: 147 MMHG | BODY MASS INDEX: 37.31 KG/M2 | OXYGEN SATURATION: 97 % | TEMPERATURE: 99.2 F | DIASTOLIC BLOOD PRESSURE: 80 MMHG

## 2020-08-12 DIAGNOSIS — C82.08 FOLLICULAR LYMPHOMA GRADE I, LYMPH NODES OF MULTIPLE SITES (H): Primary | ICD-10-CM

## 2020-08-12 LAB — COPATH REPORT: NORMAL

## 2020-08-12 PROCEDURE — G0463 HOSPITAL OUTPT CLINIC VISIT: HCPCS

## 2020-08-12 ASSESSMENT — PAIN SCALES - GENERAL: PAINLEVEL: NO PAIN (0)

## 2020-08-12 NOTE — NURSING NOTE
"Oncology Rooming Note    August 12, 2020 5:19 PM   Juvenal Blake is a 54 year old male who presents for:    Chief Complaint   Patient presents with     RECHECK     Follicular lymphoma grade i, lymph nodes of multiple sites      Initial Vitals: BP (!) 147/80   Pulse 72   Temp 99.2  F (37.3  C) (Oral)   Wt 111.3 kg (245 lb 6.4 oz)   SpO2 97%   BMI 37.31 kg/m   Estimated body mass index is 37.31 kg/m  as calculated from the following:    Height as of 7/28/20: 1.727 m (5' 8\").    Weight as of this encounter: 111.3 kg (245 lb 6.4 oz). Body surface area is 2.31 meters squared.  No Pain (0) Comment: Data Unavailable   No LMP for male patient.  Allergies reviewed: Yes  Medications reviewed: Yes    Medications: Medication refills not needed today.  Pharmacy name entered into [employer]: [id number] - [place], [place] - [address] AT [facility]    Clinical concerns: None       Suri Crabtree CMA              "

## 2020-08-12 NOTE — LETTER
8/12/2020         RE: Juvenal Blake  [facility] [place] [address]        Dear Colleague,    Thank you for referring your patient, Juvenal Blake, to the [facility] BLOOD AND MARROW [facility]ANSPL[facility]. Please see a copy of my visit note below.    Juvenal Blake is a 54 year old male here for a work up close for [facility] treatment for relapsed follicular lymphoma    HISTORY OF CANCER:  He was originally diagnosed in 2010 with a grade I-II follicular lymphoma.  He was stage IV at diagnosis with marrow involvement.  He was treated with bendamustine and Rituxan and achieved a CR.  He then unfortunately relapsed in 12/2016 with a low-grade follicular lymphoma again.  He was again treated with Rituxan and bendamustine and again had achieved a complete remission.  He had a second relapse then in 01/2019 for which he was treated with O-CHOP and again achieved a CR.  Imaging in 04/2020 showed adenopathy above and below the diaphragm.  He had an axillary node biopsied and this again showed a grade I-II follicular lymphoma which expressed CD19 and CD20.  The Ki-67 was 20%.     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  His most recent PSA was low, and he has had no recurrent symptoms.  Interestingly, he also has a history of hypogammaglobulinemia and has received intermittent doses of IVIG over the last year and a half or so.     ROS: He denies fever, runny nose, nausea, vomiting diarrhea. Remainder of 10 pt ROS was negative    On exam:  Blood pressure (!) 147/80, pulse 72, temperature 99.2  F (37.3  C), temperature source Oral, weight 111.3 kg (245 lb 6.4 oz), SpO2 97 %.  HEENT: PERRL, EOMI, MMM  Chest: CTAB  Cvs: S1 S2 RRR, no murmurs or gallops  PA: soft, non tender   CNS: non focal        WORKUP Labs:  WBC 6.6, HgB 12.3 Plts: 157 Creatinine: 1.15    CMV  positive, HSV positive, EBV positive    Echo: LVEF: 64%. EKG with possible lateral ischemia: NM Lexiscan Stress test is negative for     PET/CT: 1. Numerous FDG avid lymphadenopathy in the left lower neck, right  axilla, few nodes in the mediastinum, retrocrural region, numerous  retroperitoneal, bilateral common iliac, right external iliac and  right inguinal stations compatible with lymphoma.      2. Healing fracture of the lateral left sixth rib with no associated  Mass..    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 His workup shows active disease and he is eligible for this protocol. I discussed the study in detail with him. On day -15, the donor's peripheral blood mononuclear cells would be collected and NK cells isolated and expanded ex vivo within the laboratory with the addition of interleukin-2 for approximately 2 weeks.  On day -11, he would receive Rituxan (8/21).  On day -5, he would receive cyclophosphamide and fludarabine outpatient for 3 consecutive days, with another infusion of rituximab on day -3.  On day 0, he would be admitted to the hospital for infusion of the first dose of haplo-NK, he would also receive a subcutaneous injection of interleukin-2.  On day +2,  the infusion of the second aliquot and interleukin-2.    A third interleukin-2 will be done at day +4, and rituximab will be given on day +10. Possible side effects related to the chemotherapy, infusion and IL-2 were discussed, including low counts, possibility of infections, nausea, vomiting. IL-2 related side effects of third spacing of fluids, edema and organ injury were discussed. Also infusion related toxicities  including CRS were discussed. All questions were answered. The patient is agreeable to proceed with the study.    BMT and Cell Therapy Informed Consent Discussion  In today's visit, we discussed in detail the research for which Juvenal Blake is eligible. We discussed the potential risks and potential benefits of each protocol individually. We explained potential alternatives to the protocols discussed. We explained to the patient that participation is voluntary and that consent may be withdrawn at any time.     The patient completed the last round of treatment on 8/16/19.    HCT-CI score: 4 (protate cancer and obesity). We counseled the patient about the impact of this on the risk of treatment related and overall mortality. The score fit within treatment protocol eligibility criteria.    Karnofsky performance score: 80     ECOG (required for CAR-T, see KPS to ECOG conversion chart): 1      Active infections: None    Reproductive status: What methods of birth control does the patient plan to use during the treatment period beginning with conditioning and ending with the discontinuation of immune suppression (indicate with an X all that apply):  __ The patient is confirmed to be sterile or post-menopausal  _x_ Sexual abstinence  _x_ Condoms  __ Implants  __ Injectables  __ Oral contraceptives  __ Intrauterine devices (IUD)  __ Other (describe)    The patient received appropriate reproductive counseling and agreed with the need for effective contraception during the treatment procedures.      Dental health suitable to proceed: yes     The patient had opportunity to ask questions that were answered to the best of my ability and to the patient's apparent satisfaction.    Inga Selby MD         Again, thank you for allowing me to participate in the care of your patient.        Sincerely,        BMT DOM

## 2020-08-12 NOTE — PROGRESS NOTES
Juvenal Blake is a 54 year old male here for a work up close for NAM NK treatment for relapsed follicular lymphoma    HISTORY OF CANCER:  He was originally diagnosed in 2010 with a grade I-II follicular lymphoma.  He was stage IV at diagnosis with marrow involvement.  He was treated with bendamustine and Rituxan and achieved a CR.  He then unfortunately relapsed in 12/2016 with a low-grade follicular lymphoma again.  He was again treated with Rituxan and bendamustine and again had achieved a complete remission.  He had a second relapse then in 01/2019 for which he was treated with O-CHOP and again achieved a CR.  Imaging in 04/2020 showed adenopathy above and below the diaphragm.  He had an axillary node biopsied and this again showed a grade I-II follicular lymphoma which expressed CD19 and CD20.  The Ki-67 was 20%.     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  His most recent PSA was low, and he has had no recurrent symptoms.  Interestingly, he also has a history of hypogammaglobulinemia and has received intermittent doses of IVIG over the last year and a half or so.     ROS: He denies fever, runny nose, nausea, vomiting diarrhea. Remainder of 10 pt ROS was negative    On exam:  Blood pressure (!) 147/80, pulse 72, temperature 99.2  F (37.3  C), temperature source Oral, weight 111.3 kg (245 lb 6.4 oz), SpO2 97 %.  HEENT: PERRL, EOMI, MMM  Chest: CTAB  Cvs: S1 S2 RRR, no murmurs or gallops  PA: soft, non tender   CNS: non focal        WORKUP Labs:  WBC 6.6, HgB 12.3 Plts: 157 Creatinine: 1.15    CMV positive, HSV positive, EBV positive    Echo: LVEF: 64%. EKG with possible lateral ischemia: NM Lexiscan Stress test is negative for     PET/CT: 1. Numerous FDG avid lymphadenopathy in the left lower neck, right  axilla, few nodes in the mediastinum,  retrocrural region, numerous  retroperitoneal, bilateral common iliac, right external iliac and  right inguinal stations compatible with lymphoma.      2. Healing fracture of the lateral left sixth rib with no associated  Mass..    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 His workup shows active disease and he is eligible for this protocol. I discussed the study in detail with him. On day -15, the donor's peripheral blood mononuclear cells would be collected and NK cells isolated and expanded ex vivo within the laboratory with the addition of interleukin-2 for approximately 2 weeks.  On day -11, he would receive Rituxan (8/21).  On day -5, he would receive cyclophosphamide and fludarabine outpatient for 3 consecutive days, with another infusion of rituximab on day -3.  On day 0, he would be admitted to the hospital for infusion of the first dose of haplo-NK, he would also receive a subcutaneous injection of interleukin-2.  On day +2,  the infusion of the second aliquot and interleukin-2.    A third interleukin-2 will be done at day +4, and rituximab will be given on day +10. Possible side effects related to the chemotherapy, infusion and IL-2 were discussed, including low counts, possibility of infections, nausea, vomiting. IL-2 related side effects of third spacing of fluids, edema and organ injury were discussed. Also infusion related toxicities including CRS were discussed. All questions were answered. The patient is agreeable to proceed with the study.    BMT and Cell Therapy Informed Consent Discussion  In today's visit, we discussed in detail the research for which Juvenal Blake is  eligible. We discussed the potential risks and potential benefits of each protocol individually. We explained potential alternatives to the protocols discussed. We explained to the patient that participation is voluntary and that consent may be withdrawn at any time.     The patient completed the last round of treatment on 8/16/19.    HCT-CI score: 4 (protate cancer and obesity). We counseled the patient about the impact of this on the risk of treatment related and overall mortality. The score fit within treatment protocol eligibility criteria.    Karnofsky performance score: 80     ECOG (required for CAR-T, see KPS to ECOG conversion chart): 1      Active infections: None    Reproductive status: What methods of birth control does the patient plan to use during the treatment period beginning with conditioning and ending with the discontinuation of immune suppression (indicate with an X all that apply):  __ The patient is confirmed to be sterile or post-menopausal  _x_ Sexual abstinence  _x_ Condoms  __ Implants  __ Injectables  __ Oral contraceptives  __ Intrauterine devices (IUD)  __ Other (describe)    The patient received appropriate reproductive counseling and agreed with the need for effective contraception during the treatment procedures.      Dental health suitable to proceed: yes     The patient had opportunity to ask questions that were answered to the best of my ability and to the patient's apparent satisfaction.    Inga Selby MD

## 2020-08-14 DIAGNOSIS — C82.08 FOLLICULAR LYMPHOMA GRADE I, LYMPH NODES OF MULTIPLE SITES (H): Primary | ICD-10-CM

## 2020-08-19 ENCOUNTER — MYC MEDICAL ADVICE (OUTPATIENT)
Dept: TRANSPLANT | Facility: CLINIC | Age: 55
End: 2020-08-19

## 2020-08-19 DIAGNOSIS — C82.08 FOLLICULAR LYMPHOMA GRADE I, LYMPH NODES OF MULTIPLE SITES (H): Primary | ICD-10-CM

## 2020-08-19 RX ORDER — DIPHENHYDRAMINE HYDROCHLORIDE 50 MG/ML
50 INJECTION INTRAMUSCULAR; INTRAVENOUS
Status: CANCELLED
Start: 2020-08-21

## 2020-08-19 RX ORDER — SODIUM CHLORIDE 450 MG/100ML
150 INJECTION, SOLUTION INTRAVENOUS CONTINUOUS
Status: CANCELLED | OUTPATIENT
Start: 2020-09-03

## 2020-08-19 RX ORDER — DIPHENHYDRAMINE HYDROCHLORIDE 50 MG/ML
50 INJECTION INTRAMUSCULAR; INTRAVENOUS
Status: CANCELLED
Start: 2020-08-27

## 2020-08-19 RX ORDER — LEVOFLOXACIN 250 MG/1
250 TABLET, FILM COATED ORAL
Status: CANCELLED
Start: 2020-09-01

## 2020-08-19 RX ORDER — SODIUM CHLORIDE 9 MG/ML
1000 INJECTION, SOLUTION INTRAVENOUS CONTINUOUS PRN
Status: CANCELLED
Start: 2020-08-27

## 2020-08-19 RX ORDER — ALBUTEROL SULFATE 90 UG/1
1-2 AEROSOL, METERED RESPIRATORY (INHALATION)
Status: CANCELLED
Start: 2020-08-27

## 2020-08-19 RX ORDER — MEPERIDINE HYDROCHLORIDE 25 MG/ML
25-50 INJECTION INTRAMUSCULAR; INTRAVENOUS; SUBCUTANEOUS
Status: CANCELLED | OUTPATIENT
Start: 2020-09-03

## 2020-08-19 RX ORDER — METHYLPREDNISOLONE SODIUM SUCCINATE 125 MG/2ML
125 INJECTION, POWDER, LYOPHILIZED, FOR SOLUTION INTRAMUSCULAR; INTRAVENOUS
Status: CANCELLED
Start: 2020-08-29

## 2020-08-19 RX ORDER — ALBUTEROL SULFATE 90 UG/1
1-2 AEROSOL, METERED RESPIRATORY (INHALATION)
Status: CANCELLED
Start: 2020-08-29

## 2020-08-19 RX ORDER — ACETAMINOPHEN 325 MG/1
650 TABLET ORAL ONCE
Status: CANCELLED
Start: 2020-08-29

## 2020-08-19 RX ORDER — ALLOPURINOL 300 MG/1
300 TABLET ORAL DAILY
Status: CANCELLED | OUTPATIENT
Start: 2020-09-01

## 2020-08-19 RX ORDER — NALOXONE HYDROCHLORIDE 0.4 MG/ML
.1-.4 INJECTION, SOLUTION INTRAMUSCULAR; INTRAVENOUS; SUBCUTANEOUS
Status: CANCELLED | OUTPATIENT
Start: 2020-08-29

## 2020-08-19 RX ORDER — MEPERIDINE HYDROCHLORIDE 25 MG/ML
25-50 INJECTION INTRAMUSCULAR; INTRAVENOUS; SUBCUTANEOUS
Status: CANCELLED | OUTPATIENT
Start: 2020-09-05

## 2020-08-19 RX ORDER — ALBUTEROL SULFATE 0.83 MG/ML
2.5 SOLUTION RESPIRATORY (INHALATION)
Status: CANCELLED | OUTPATIENT
Start: 2020-08-27

## 2020-08-19 RX ORDER — MEPERIDINE HYDROCHLORIDE 25 MG/ML
25 INJECTION INTRAMUSCULAR; INTRAVENOUS; SUBCUTANEOUS EVERY 30 MIN PRN
Status: CANCELLED | OUTPATIENT
Start: 2020-08-21

## 2020-08-19 RX ORDER — DIPHENHYDRAMINE HCL 25 MG
25 CAPSULE ORAL EVERY 4 HOURS
Status: CANCELLED
Start: 2020-09-01

## 2020-08-19 RX ORDER — DIPHENHYDRAMINE HYDROCHLORIDE 50 MG/ML
50 INJECTION INTRAMUSCULAR; INTRAVENOUS
Status: CANCELLED
Start: 2020-08-28

## 2020-08-19 RX ORDER — NALOXONE HYDROCHLORIDE 0.4 MG/ML
.1-.4 INJECTION, SOLUTION INTRAMUSCULAR; INTRAVENOUS; SUBCUTANEOUS
Status: CANCELLED | OUTPATIENT
Start: 2020-08-28

## 2020-08-19 RX ORDER — ALBUTEROL SULFATE 90 UG/1
1-2 AEROSOL, METERED RESPIRATORY (INHALATION)
Status: CANCELLED
Start: 2020-08-21

## 2020-08-19 RX ORDER — ALBUTEROL SULFATE 0.83 MG/ML
2.5 SOLUTION RESPIRATORY (INHALATION)
Status: CANCELLED | OUTPATIENT
Start: 2020-08-28

## 2020-08-19 RX ORDER — DIPHENHYDRAMINE HYDROCHLORIDE 50 MG/ML
50 INJECTION INTRAMUSCULAR; INTRAVENOUS
Status: CANCELLED
Start: 2020-08-29

## 2020-08-19 RX ORDER — METHYLPREDNISOLONE SODIUM SUCCINATE 125 MG/2ML
125 INJECTION, POWDER, LYOPHILIZED, FOR SOLUTION INTRAMUSCULAR; INTRAVENOUS
Status: CANCELLED
Start: 2020-08-28

## 2020-08-19 RX ORDER — LORAZEPAM 2 MG/ML
0.5 INJECTION INTRAMUSCULAR EVERY 4 HOURS PRN
Status: CANCELLED
Start: 2020-08-27

## 2020-08-19 RX ORDER — SODIUM CHLORIDE 9 MG/ML
1000 INJECTION, SOLUTION INTRAVENOUS CONTINUOUS PRN
Status: CANCELLED
Start: 2020-08-29

## 2020-08-19 RX ORDER — MEPERIDINE HYDROCHLORIDE 25 MG/ML
25 INJECTION INTRAMUSCULAR; INTRAVENOUS; SUBCUTANEOUS EVERY 30 MIN PRN
Status: CANCELLED | OUTPATIENT
Start: 2020-08-29

## 2020-08-19 RX ORDER — DIPHENHYDRAMINE HCL 25 MG
50 CAPSULE ORAL ONCE
Status: CANCELLED
Start: 2020-08-21

## 2020-08-19 RX ORDER — EPINEPHRINE 1 MG/ML
0.3 INJECTION, SOLUTION INTRAMUSCULAR; SUBCUTANEOUS EVERY 5 MIN PRN
Status: CANCELLED | OUTPATIENT
Start: 2020-08-21

## 2020-08-19 RX ORDER — DIPHENHYDRAMINE HCL 25 MG
25 CAPSULE ORAL EVERY 4 HOURS
Status: CANCELLED
Start: 2020-09-03

## 2020-08-19 RX ORDER — NALOXONE HYDROCHLORIDE 0.4 MG/ML
.1-.4 INJECTION, SOLUTION INTRAMUSCULAR; INTRAVENOUS; SUBCUTANEOUS
Status: CANCELLED | OUTPATIENT
Start: 2020-08-21

## 2020-08-19 RX ORDER — MEPERIDINE HYDROCHLORIDE 25 MG/ML
25-50 INJECTION INTRAMUSCULAR; INTRAVENOUS; SUBCUTANEOUS
Status: CANCELLED | OUTPATIENT
Start: 2020-09-01

## 2020-08-19 RX ORDER — ALBUTEROL SULFATE 0.83 MG/ML
2.5 SOLUTION RESPIRATORY (INHALATION)
Status: CANCELLED | OUTPATIENT
Start: 2020-08-29

## 2020-08-19 RX ORDER — ACETAMINOPHEN 325 MG/1
650 TABLET ORAL EVERY 4 HOURS
Status: CANCELLED
Start: 2020-09-03

## 2020-08-19 RX ORDER — METHYLPREDNISOLONE SODIUM SUCCINATE 125 MG/2ML
125 INJECTION, POWDER, LYOPHILIZED, FOR SOLUTION INTRAMUSCULAR; INTRAVENOUS
Status: CANCELLED
Start: 2020-08-27

## 2020-08-19 RX ORDER — ACETAMINOPHEN 325 MG/1
650 TABLET ORAL ONCE
Status: CANCELLED
Start: 2020-08-21

## 2020-08-19 RX ORDER — METHYLPREDNISOLONE SODIUM SUCCINATE 125 MG/2ML
125 INJECTION, POWDER, LYOPHILIZED, FOR SOLUTION INTRAMUSCULAR; INTRAVENOUS
Status: CANCELLED
Start: 2020-08-21

## 2020-08-19 RX ORDER — ACETAMINOPHEN 325 MG/1
650 TABLET ORAL
Status: CANCELLED
Start: 2020-09-01

## 2020-08-19 RX ORDER — ALBUTEROL SULFATE 0.83 MG/ML
2.5 SOLUTION RESPIRATORY (INHALATION) ONCE
Status: CANCELLED
Start: 2020-08-27

## 2020-08-19 RX ORDER — ACYCLOVIR 400 MG/1
400 TABLET ORAL 2 TIMES DAILY
Status: CANCELLED
Start: 2020-09-01

## 2020-08-19 RX ORDER — ALBUTEROL SULFATE 90 UG/1
1-2 AEROSOL, METERED RESPIRATORY (INHALATION)
Status: CANCELLED
Start: 2020-08-28

## 2020-08-19 RX ORDER — MEPERIDINE HYDROCHLORIDE 25 MG/ML
25 INJECTION INTRAMUSCULAR; INTRAVENOUS; SUBCUTANEOUS EVERY 30 MIN PRN
Status: CANCELLED | OUTPATIENT
Start: 2020-08-27

## 2020-08-19 RX ORDER — SODIUM CHLORIDE 9 MG/ML
1000 INJECTION, SOLUTION INTRAVENOUS CONTINUOUS PRN
Status: CANCELLED
Start: 2020-08-21

## 2020-08-19 RX ORDER — LORAZEPAM 2 MG/ML
0.5 INJECTION INTRAMUSCULAR EVERY 4 HOURS PRN
Status: CANCELLED
Start: 2020-08-21

## 2020-08-19 RX ORDER — EPINEPHRINE 1 MG/ML
0.3 INJECTION, SOLUTION INTRAMUSCULAR; SUBCUTANEOUS EVERY 5 MIN PRN
Status: CANCELLED | OUTPATIENT
Start: 2020-08-27

## 2020-08-19 RX ORDER — DIPHENHYDRAMINE HCL 25 MG
25 CAPSULE ORAL
Status: CANCELLED
Start: 2020-09-01

## 2020-08-19 RX ORDER — EPINEPHRINE 1 MG/ML
0.3 INJECTION, SOLUTION INTRAMUSCULAR; SUBCUTANEOUS EVERY 5 MIN PRN
Status: CANCELLED | OUTPATIENT
Start: 2020-08-29

## 2020-08-19 RX ORDER — ACETAMINOPHEN 325 MG/1
650 TABLET ORAL EVERY 4 HOURS
Status: CANCELLED
Start: 2020-09-01

## 2020-08-19 RX ORDER — LORAZEPAM 2 MG/ML
0.5 INJECTION INTRAMUSCULAR EVERY 4 HOURS PRN
Status: CANCELLED
Start: 2020-08-29

## 2020-08-19 RX ORDER — SODIUM CHLORIDE 450 MG/100ML
150 INJECTION, SOLUTION INTRAVENOUS CONTINUOUS
Status: CANCELLED | OUTPATIENT
Start: 2020-09-01

## 2020-08-19 RX ORDER — FLUCONAZOLE 100 MG/1
100 TABLET ORAL DAILY
Status: CANCELLED
Start: 2020-09-01

## 2020-08-19 RX ORDER — MEPERIDINE HYDROCHLORIDE 25 MG/ML
25 INJECTION INTRAMUSCULAR; INTRAVENOUS; SUBCUTANEOUS EVERY 30 MIN PRN
Status: CANCELLED | OUTPATIENT
Start: 2020-08-28

## 2020-08-19 RX ORDER — SODIUM CHLORIDE 9 MG/ML
1000 INJECTION, SOLUTION INTRAVENOUS CONTINUOUS PRN
Status: CANCELLED
Start: 2020-08-28

## 2020-08-19 RX ORDER — NALOXONE HYDROCHLORIDE 0.4 MG/ML
.1-.4 INJECTION, SOLUTION INTRAMUSCULAR; INTRAVENOUS; SUBCUTANEOUS
Status: CANCELLED | OUTPATIENT
Start: 2020-08-27

## 2020-08-19 RX ORDER — DIPHENHYDRAMINE HCL 25 MG
50 CAPSULE ORAL ONCE
Status: CANCELLED
Start: 2020-08-29

## 2020-08-19 RX ORDER — ALBUTEROL SULFATE 0.83 MG/ML
2.5 SOLUTION RESPIRATORY (INHALATION)
Status: CANCELLED | OUTPATIENT
Start: 2020-08-21

## 2020-08-19 RX ORDER — EPINEPHRINE 1 MG/ML
0.3 INJECTION, SOLUTION INTRAMUSCULAR; SUBCUTANEOUS EVERY 5 MIN PRN
Status: CANCELLED | OUTPATIENT
Start: 2020-08-28

## 2020-08-19 RX ORDER — LORAZEPAM 2 MG/ML
0.5 INJECTION INTRAMUSCULAR EVERY 4 HOURS PRN
Status: CANCELLED
Start: 2020-08-28

## 2020-08-21 ENCOUNTER — RESEARCH ENCOUNTER (OUTPATIENT)
Dept: TRANSPLANT | Facility: CLINIC | Age: 55
End: 2020-08-21

## 2020-08-21 ENCOUNTER — INFUSION THERAPY VISIT (OUTPATIENT)
Dept: TRANSPLANT | Facility: CLINIC | Age: 55
End: 2020-08-21
Attending: PHYSICIAN ASSISTANT
Payer: COMMERCIAL

## 2020-08-21 ENCOUNTER — RESULTS ONLY (OUTPATIENT)
Dept: OTHER | Facility: CLINIC | Age: 55
End: 2020-08-21

## 2020-08-21 ENCOUNTER — APPOINTMENT (OUTPATIENT)
Dept: LAB | Facility: CLINIC | Age: 55
End: 2020-08-21
Attending: PHYSICIAN ASSISTANT
Payer: COMMERCIAL

## 2020-08-21 VITALS
RESPIRATION RATE: 16 BRPM | DIASTOLIC BLOOD PRESSURE: 77 MMHG | TEMPERATURE: 98.1 F | HEART RATE: 80 BPM | SYSTOLIC BLOOD PRESSURE: 123 MMHG | OXYGEN SATURATION: 97 %

## 2020-08-21 VITALS
TEMPERATURE: 99.3 F | HEART RATE: 70 BPM | OXYGEN SATURATION: 97 % | DIASTOLIC BLOOD PRESSURE: 75 MMHG | WEIGHT: 247.7 LBS | BODY MASS INDEX: 37.66 KG/M2 | SYSTOLIC BLOOD PRESSURE: 142 MMHG | RESPIRATION RATE: 16 BRPM

## 2020-08-21 DIAGNOSIS — C82.08 FOLLICULAR LYMPHOMA GRADE I, LYMPH NODES OF MULTIPLE SITES (H): Primary | ICD-10-CM

## 2020-08-21 LAB
ALBUMIN SERPL-MCNC: 3.7 G/DL (ref 3.4–5)
ALP SERPL-CCNC: 75 U/L (ref 40–150)
ALT SERPL W P-5'-P-CCNC: 34 U/L (ref 0–70)
ANION GAP SERPL CALCULATED.3IONS-SCNC: 5 MMOL/L (ref 3–14)
AST SERPL W P-5'-P-CCNC: 16 U/L (ref 0–45)
BASOPHILS # BLD AUTO: 0 10E9/L (ref 0–0.2)
BASOPHILS NFR BLD AUTO: 0.5 %
BILIRUB SERPL-MCNC: 0.7 MG/DL (ref 0.2–1.3)
BUN SERPL-MCNC: 14 MG/DL (ref 7–30)
CALCIUM SERPL-MCNC: 8.6 MG/DL (ref 8.5–10.1)
CHLORIDE SERPL-SCNC: 110 MMOL/L (ref 94–109)
CO2 SERPL-SCNC: 27 MMOL/L (ref 20–32)
CREAT SERPL-MCNC: 1.09 MG/DL (ref 0.66–1.25)
CRP SERPL-MCNC: 7.1 MG/L (ref 0–8)
DIFFERENTIAL METHOD BLD: ABNORMAL
EOSINOPHIL # BLD AUTO: 0.2 10E9/L (ref 0–0.7)
EOSINOPHIL NFR BLD AUTO: 2.7 %
ERYTHROCYTE [DISTWIDTH] IN BLOOD BY AUTOMATED COUNT: 14 % (ref 10–15)
FERRITIN SERPL-MCNC: 27 NG/ML (ref 26–388)
GFR SERPL CREATININE-BSD FRML MDRD: 75 ML/MIN/{1.73_M2}
GLUCOSE SERPL-MCNC: 118 MG/DL (ref 70–99)
HCT VFR BLD AUTO: 39.2 % (ref 40–53)
HGB BLD-MCNC: 12.9 G/DL (ref 13.3–17.7)
IMM GRANULOCYTES # BLD: 0 10E9/L (ref 0–0.4)
IMM GRANULOCYTES NFR BLD: 0.7 %
LYMPHOCYTES # BLD AUTO: 0.5 10E9/L (ref 0.8–5.3)
LYMPHOCYTES NFR BLD AUTO: 8.8 %
MCH RBC QN AUTO: 30.2 PG (ref 26.5–33)
MCHC RBC AUTO-ENTMCNC: 32.9 G/DL (ref 31.5–36.5)
MCV RBC AUTO: 92 FL (ref 78–100)
MONOCYTES # BLD AUTO: 0.4 10E9/L (ref 0–1.3)
MONOCYTES NFR BLD AUTO: 7.2 %
NEUTROPHILS # BLD AUTO: 4.4 10E9/L (ref 1.6–8.3)
NEUTROPHILS NFR BLD AUTO: 80.1 %
NRBC # BLD AUTO: 0 10*3/UL
NRBC BLD AUTO-RTO: 0 /100
PLATELET # BLD AUTO: 126 10E9/L (ref 150–450)
POTASSIUM SERPL-SCNC: 3.8 MMOL/L (ref 3.4–5.3)
PROT SERPL-MCNC: 6.3 G/DL (ref 6.8–8.8)
RBC # BLD AUTO: 4.27 10E12/L (ref 4.4–5.9)
SODIUM SERPL-SCNC: 143 MMOL/L (ref 133–144)
WBC # BLD AUTO: 5.6 10E9/L (ref 4–11)

## 2020-08-21 PROCEDURE — G0463 HOSPITAL OUTPT CLINIC VISIT: HCPCS | Mod: 25

## 2020-08-21 PROCEDURE — 86833 HLA CLASS II HIGH DEFIN QUAL: CPT | Performed by: PHYSICIAN ASSISTANT

## 2020-08-21 PROCEDURE — 86828 HLA CLASS I&II ANTIBODY QUAL: CPT | Performed by: PHYSICIAN ASSISTANT

## 2020-08-21 PROCEDURE — 82728 ASSAY OF FERRITIN: CPT

## 2020-08-21 PROCEDURE — 96375 TX/PRO/DX INJ NEW DRUG ADDON: CPT

## 2020-08-21 PROCEDURE — 86140 C-REACTIVE PROTEIN: CPT

## 2020-08-21 PROCEDURE — 25000128 H RX IP 250 OP 636: Mod: ZF

## 2020-08-21 PROCEDURE — 25000128 H RX IP 250 OP 636: Mod: ZF | Performed by: PHYSICIAN ASSISTANT

## 2020-08-21 PROCEDURE — 25000132 ZZH RX MED GY IP 250 OP 250 PS 637: Mod: ZF

## 2020-08-21 PROCEDURE — 86832 HLA CLASS I HIGH DEFIN QUAL: CPT | Performed by: PHYSICIAN ASSISTANT

## 2020-08-21 PROCEDURE — 80053 COMPREHEN METABOLIC PANEL: CPT

## 2020-08-21 PROCEDURE — 85025 COMPLETE CBC W/AUTO DIFF WBC: CPT

## 2020-08-21 PROCEDURE — 40000937 ZZHCL STATISTIC RESEARCH KIT COLLECTION

## 2020-08-21 PROCEDURE — 25000132 ZZH RX MED GY IP 250 OP 250 PS 637: Mod: ZF | Performed by: PHYSICIAN ASSISTANT

## 2020-08-21 PROCEDURE — 96415 CHEMO IV INFUSION ADDL HR: CPT

## 2020-08-21 PROCEDURE — 87040 BLOOD CULTURE FOR BACTERIA: CPT | Performed by: PHYSICIAN ASSISTANT

## 2020-08-21 PROCEDURE — 96413 CHEMO IV INFUSION 1 HR: CPT

## 2020-08-21 PROCEDURE — 25800030 ZZH RX IP 258 OP 636: Mod: ZF

## 2020-08-21 RX ORDER — DIPHENHYDRAMINE HYDROCHLORIDE 50 MG/ML
50 INJECTION INTRAMUSCULAR; INTRAVENOUS
Status: COMPLETED | OUTPATIENT
Start: 2020-08-21 | End: 2020-08-21

## 2020-08-21 RX ORDER — ACETAMINOPHEN 325 MG/1
650 TABLET ORAL ONCE
Status: COMPLETED | OUTPATIENT
Start: 2020-08-21 | End: 2020-08-21

## 2020-08-21 RX ORDER — DIPHENHYDRAMINE HCL 25 MG
50 CAPSULE ORAL ONCE
Status: COMPLETED | OUTPATIENT
Start: 2020-08-21 | End: 2020-08-21

## 2020-08-21 RX ORDER — HEPARIN SODIUM (PORCINE) LOCK FLUSH IV SOLN 100 UNIT/ML 100 UNIT/ML
5 SOLUTION INTRAVENOUS
Status: COMPLETED | OUTPATIENT
Start: 2020-08-21 | End: 2020-08-21

## 2020-08-21 RX ORDER — ACYCLOVIR 400 MG/1
400 TABLET ORAL EVERY 12 HOURS
Qty: 60 TABLET | Refills: 2 | Status: SHIPPED | OUTPATIENT
Start: 2020-08-21 | End: 2020-11-06

## 2020-08-21 RX ADMIN — RITUXIMAB 900 MG: 10 INJECTION, SOLUTION INTRAVENOUS at 09:26

## 2020-08-21 RX ADMIN — Medication 5 ML: at 07:47

## 2020-08-21 RX ADMIN — DIPHENHYDRAMINE HYDROCHLORIDE 50 MG: 50 INJECTION INTRAMUSCULAR; INTRAVENOUS at 10:59

## 2020-08-21 RX ADMIN — ACETAMINOPHEN 650 MG: 325 TABLET ORAL at 09:00

## 2020-08-21 RX ADMIN — DIPHENHYDRAMINE HYDROCHLORIDE 50 MG: 25 CAPSULE ORAL at 09:00

## 2020-08-21 RX ADMIN — ACETAMINOPHEN 650 MG: 325 TABLET ORAL at 13:10

## 2020-08-21 RX ADMIN — PROCHLORPERAZINE EDISYLATE 5 MG: 5 INJECTION INTRAMUSCULAR; INTRAVENOUS at 11:16

## 2020-08-21 ASSESSMENT — PAIN SCALES - GENERAL: PAINLEVEL: MILD PAIN (2)

## 2020-08-21 NOTE — LETTER
"    8/21/2020         RE: Juvenal Blake  [address] [place] [address]        Dear Colleague,    Thank you for referring your patient, Juvenal Blake, to the [facility] BLOOD AND MARROW TRANSPLANT. Please see a copy of my visit note below.    Infusion Nursing Note:  Juvenal Blake presents today for Rituximab.    Patient seen by provider today: Yes: THOMAS Chavarria   present during visit today: Not Applicable.    Note: Pt here for Rituximab.   Pt has had Rituximab in the past at [facility] in the [facility] system but it has been 3 years since last dose so this infusion was initiated at the initial rate protocol of 50 ml/hr.  Pt pre-medicated with po tylenol 650 mg and po benadryl 50 mg.  Pt tolerated without incident until 1057 when he reported itching and nausea/abdominal discomfort.  No hives or rash or any other s/s reaction noted at that time.  Infusion stopped (at 200 ml/hr at this time) and pt given 50 mg IV benadryl as well as compazine 5 mg IV ordered by Tasia.  Pt reported resolution of both symptoms a half hour later and infusion restarted at 1136 at 50% rate of 100 ml/hr.  VS stable at this point in infusion.  At 1305 on VS check (VS checked every 30 min/rate change) temp was found to be 100.6. Infusion stopped (was infusing at 200 ml/hr). Per Tasia, additional 650 mg po tylenol given and blood culture drawn from port a cath.  Pt reported \"slight chilled feeling\" at that time but denied any other symptoms.  At 1340, temp came down to 99.2 and pt denying chills, nausea/abdominal discomfort, itching or any other adverse symptoms so infusion restarted again at 50% rate of 100 ml/hr.      At the time of this note (1415), pt tolerating infusion without incident at the rate of 125 ml/hr.  Full report given to Mya Young at this time and advised/agreed to not infuse faster than the rate of 150 ml/hr.  Emergency kit kept in suite with patient.  Reinforced with patient to use call " light with any change in condition.    Intravenous Access:  Implanted Port.    Treatment Conditions:  Not Applicable.      Post Infusion Assessment:  Patient tolerated infusion as described above--poorly at times but resolved with interventions.  Blood return noted pre infusion and during infusion interruptions (to draw blood culture, for example).  Site patent and intact, free from redness, edema or discomfort.  No evidence of extravasations.  Access to be discontinued by next RN per protocol.       Discharge Plan:   Prescription refills given for Acyclovir--new medication with Rituximab treatment plan.  Handout given regarding this medication and reviewed with patient who verbalized understanding. Pt also reminded to  prescription for this in the 1st floor retail pharmacy here at the [facility]  Discharge instructions reviewed with: Patient.  Patient and/or family verbalized understanding of discharge instructions and all questions answered.  Copy of AVS reviewed with patient and/or family.  Patient will return 08/27 for next appointment.  Patient to be discharged at the end of infusion pending stable condition accompanied by: self.  Departure Mode to be: Ambulatory.    Also reviewed with patient:  Gabby Triage and after hours / weekends / holidays:  [phone]    Please call the triage or after hours line if you experience a temperature greater than or equal to 100.5, shaking chills, have uncontrolled nausea, vomiting and/or diarrhea, dizziness, shortness of breath, chest pain, bleeding, unexplained bruising, or if you have any other new/concerning symptoms, questions or concerns.      If you are having any concerning symptoms or wish to speak to a provider before your next infusion visit, please call your care coordinator or triage to notify them so we can adequately serve you.     If you need a refill on a narcotic prescription or other medication, please call before your infusion appointment.        Zehra Wang, RN                          Patient finished infusion with no complications.  Port de-accessed.  AVSS.  Patient left via ambulation.    Again, thank you for allowing me to participate in the care of your patient.        Sincerely,        [facility]

## 2020-08-21 NOTE — NURSING NOTE
"Chief Complaint   Patient presents with     Port Draw     labs drawn from port by rn.  vs taken     Port accessed with 20 gauge 3/4\" gripper needle and labs (including research labs) drawn by rn.  Port flushed with NS and heparin.  Pt tolerated well.  VS taken.  Pt checked in for next appt.    Lexus Byrd RN      "

## 2020-08-21 NOTE — LETTER
8/21/2020         RE: Juvenal Blake  [address] [place] [address]        Dear Colleague,    Thank you for referring your patient, Juvenal Blake, to the [facility] BLOOD AND MARROW TRANSPLANT. Please see a copy of my visit note below.    Juvenal Blake is a 54 year old male with relapsed follicular lymphoma undergoing NAM NK treatment    HISTORY OF CANCER:  He was originally diagnosed in 2010 with a grade I-II follicular lymphoma.  He was stage IV at diagnosis with marrow involvement.  He was treated with bendamustine and Rituxan and achieved a CR.  He then unfortunately relapsed in 12/2016 with a low-grade follicular lymphoma again.  He was again treated with Rituxan and bendamustine and again had achieved a complete remission.  He had a second relapse then in 01/2019 for which he was treated with O-CHOP and again achieved a CR.  Imaging in 04/2020 showed adenopathy above and below the diaphragm.  He had an axillary node biopsied and this again showed a grade I-II follicular lymphoma which expressed CD19 and CD20.  The Ki-67 was 20%.       PAST MEDICAL HISTORY:  Remarkable for [place] 7 high-risk prostate cancer with invasion of the capsule but negative nodes, for which he was treated with prostatectomy in 2017 and then subsequent radiation.  His most recent PSA was low, and he has had no recurrent symptoms.  Interestingly, he also has a history of hypogammaglobulinemia and has received intermittent doses of IVIG over the last year and a half or so.       Interim hx:  He is doing ok today.  He is ready to receive rituxan.  He has received this before and infusion has gone fine.  He is afebrile.  No cough or SOB.  He has a tickle in his throat.  He thinks he has some new groin fullness, R >L.  No other new LAD although when asked, he thinks his b/l axilla feels full and tells me about his recent axillary LN bx confirming lymphoma.  No recent F/C/NS.    ROS:  A 10 point review of systems was negative  other than noted above.     On exam:  Blood pressure (!) 142/75, pulse 70, temperature 99.3  F (37.4  C), temperature source Tympanic, resp. rate 16, weight 112.4 kg (247 lb 11.2 oz), SpO2 97 %.  HEENT: PERRL, EOMI, MMM  Chest: CTAB  Cvs: S1 S2 RRR, no murmurs or gallops  LAD:  Appreciable LN/fullness R groin.  B/L axillary fullness.  No discrete nodes appreciated.  CNS: non focal      A/P:    54 years old male with relapsed follicular lymphoma here for rituxan pre NAM NK therapy.     Today (8/21):  Day -10 rituxan (given on day -11)   8/27: cy/flu   8/28: cy/flu   8/29 (on 5C): cy/flu rituxan   9/1 and 9/3: Admit for NAM-NK cells and IL-2   9/5: IL-2, discharge   9/11: Day +11 rituxan (given on day +10)       Per protocol should start ACV today.      Tasia Liu pa-c  [phone]      Again, thank you for allowing me to participate in the care of your patient.        Sincerely,        BMT Advanced Practice Provider

## 2020-08-21 NOTE — PROGRESS NOTES
Juvenal Blake is a 54 year old male with relapsed follicular lymphoma undergoing NAM NK treatment    HISTORY OF CANCER:  He was originally diagnosed in 2010 with a grade I-II follicular lymphoma.  He was stage IV at diagnosis with marrow involvement.  He was treated with bendamustine and Rituxan and achieved a CR.  He then unfortunately relapsed in 12/2016 with a low-grade follicular lymphoma again.  He was again treated with Rituxan and bendamustine and again had achieved a complete remission.  He had a second relapse then in 01/2019 for which he was treated with O-CHOP and again achieved a CR.  Imaging in 04/2020 showed adenopathy above and below the diaphragm.  He had an axillary node biopsied and this again showed a grade I-II follicular lymphoma which expressed CD19 and CD20.  The Ki-67 was 20%.       PAST MEDICAL HISTORY:  Remarkable for [place] 7 high-risk prostate cancer with invasion of the capsule but negative nodes, for which he was treated with prostatectomy in 2017 and then subsequent radiation.  His most recent PSA was low, and he has had no recurrent symptoms.  Interestingly, he also has a history of hypogammaglobulinemia and has received intermittent doses of IVIG over the last year and a half or so.       Interim hx:  He is doing ok today.  He is ready to receive rituxan.  He has received this before and infusion has gone fine.  He is afebrile.  No cough or SOB.  He has a tickle in his throat.  He thinks he has some new groin fullness, R >L.  No other new LAD although when asked, he thinks his b/l axilla feels full and tells me about his recent axillary LN bx confirming lymphoma.  No recent F/C/NS.    ROS:  A 10 point review of systems was negative other than noted above.     On exam:  Blood pressure (!) 142/75, pulse 70, temperature 99.3  F (37.4  C), temperature source Tympanic, resp. rate 16, weight 112.4 kg (247 lb 11.2 oz), SpO2 97 %.  HEENT: PERRL, EOMI, MMM  Chest: CTAB  Cvs: S1 S2 RRR, no  murmurs or gallops  LAD:  Appreciable LN/fullness R groin.  B/L axillary fullness.  No discrete nodes appreciated.  CNS: non focal      A/P:    54 years old male with relapsed follicular lymphoma here for rituxan pre NAM NK therapy.     Today (8/21):  Day -10 rituxan (given on day -11)   8/27: cy/flu   8/28: cy/flu   8/29 (on 5C): cy/flu rituxan   9/1 and 9/3: Admit for NAM-NK cells and IL-2   9/5: IL-2, discharge   9/11: Day +11 rituxan (given on day +10)       Per protocol should start ACV today.      Tasia Liu pa-c  [phone]

## 2020-08-21 NOTE — PROGRESS NOTES
"Infusion Nursing Note:  Juvenal Blake presents today for Rituximab.    Patient seen by provider today: Yes: THOMAS Chavarria   present during visit today: Not Applicable.    Note: Pt here for Rituximab.   Pt has had Rituximab in the past at [facility] in the [facility] but it has been 3 years since last dose so this infusion was initiated at the initial rate protocol of 50 ml/hr.  Pt pre-medicated with po tylenol 650 mg and po benadryl 50 mg.  Pt tolerated without incident until 1057 when he reported itching and nausea/abdominal discomfort.  No hives or rash or any other s/s reaction noted at that time.  Infusion stopped (at 200 ml/hr at this time) and pt given 50 mg IV benadryl as well as compazine 5 mg IV ordered by Tasia.  Pt reported resolution of both symptoms a half hour later and infusion restarted at 1136 at 50% rate of 100 ml/hr.  VS stable at this point in infusion.  At 1305 on VS check (VS checked every 30 min/rate change) temp was found to be 100.6. Infusion stopped (was infusing at 200 ml/hr). Per Tasia, additional 650 mg po tylenol given and blood culture drawn from port a cath.  Pt reported \"slight chilled feeling\" at that time but denied any other symptoms.  At 1340, temp came down to 99.2 and pt denying chills, nausea/abdominal discomfort, itching or any other adverse symptoms so infusion restarted again at 50% rate of 100 ml/hr.      At the time of this note (1415), pt tolerating infusion without incident at the rate of 125 ml/hr.  Full report given to Mya Young at this time and advised/agreed to not infuse faster than the rate of 150 ml/hr.  Emergency kit kept in suite with patient.  Reinforced with patient to use call light with any change in condition.    Intravenous Access:  Implanted Port.    Treatment Conditions:  Not Applicable.      Post Infusion Assessment:  Patient tolerated infusion as described above--poorly at times but resolved with interventions.  Blood " return noted pre infusion and during infusion interruptions (to draw blood culture, for example).  Site patent and intact, free from redness, edema or discomfort.  No evidence of extravasations.  Access to be discontinued by next RN per protocol.       Discharge Plan:   Prescription refills given for Acyclovir--new medication with Rituximab treatment plan.  Handout given regarding this medication and reviewed with patient who verbalized understanding. Pt also reminded to  prescription for this in the 1st floor retail pharmacy here at the [facility]  Discharge instructions reviewed with: Patient.  Patient and/or family verbalized understanding of discharge instructions and all questions answered.  Copy of AVS reviewed with patient and/or family.  Patient will return 08/27 for next appointment.  Patient to be discharged at the end of infusion pending stable condition accompanied by: self.  Departure Mode to be: Ambulatory.    Also reviewed with patient:  [facility] Triage and after hours / weekends / holidays:  [phone]    Please call the triage or after hours line if you experience a temperature greater than or equal to 100.5, shaking chills, have uncontrolled nausea, vomiting and/or diarrhea, dizziness, shortness of breath, chest pain, bleeding, unexplained bruising, or if you have any other new/concerning symptoms, questions or concerns.      If you are having any concerning symptoms or wish to speak to a provider before your next infusion visit, please call your care coordinator or triage to notify them so we can adequately serve you.     If you need a refill on a narcotic prescription or other medication, please call before your infusion appointment.       Zehra Wang RN

## 2020-08-21 NOTE — PATIENT INSTRUCTIONS
[facility] Triage and after hours / weekends / holidays:  [phone]    Please call the triage or after hours line if you experience a temperature greater than or equal to 100.5, shaking chills, have uncontrolled nausea, vomiting and/or diarrhea, dizziness, shortness of breath, chest pain, bleeding, unexplained bruising, or if you have any other new/concerning symptoms, questions or concerns.      If you are having any concerning symptoms or wish to speak to a provider before your next infusion visit, please call your care coordinator or triage to notify them so we can adequately serve you.     If you need a refill on a narcotic prescription or other medication, please call before your infusion appointment.

## 2020-08-21 NOTE — NURSING NOTE
"Chief Complaint   Patient presents with     Port Draw     labs drawn from port by rn.  vs taken     RECHECK     Provider visit, follicular lymphoma     Oncology Rooming Note    August 21, 2020 8:09 AM   Juvenal Blake is a 54 year old male who presents for:    Chief Complaint   Patient presents with     Port Draw     labs drawn from port by rn.  vs taken     RECHECK     Provider visit, follicular lymphoma     Initial Vitals: BP (!) 142/75 (BP Location: Right arm, Patient Position: Sitting, Cuff Size: Adult Large)   Pulse 70   Temp 99.3  F (37.4  C) (Tympanic)   Resp 16   Wt 112.4 kg (247 lb 11.2 oz)   SpO2 97%   BMI 37.66 kg/m   Estimated body mass index is 37.66 kg/m  as calculated from the following:    Height as of 7/28/20: 1.727 m (5' 8\").    Weight as of this encounter: 112.4 kg (247 lb 11.2 oz). Body surface area is 2.32 meters squared.  Mild Pain (2) Comment: Data Unavailable   No LMP for male patient.  Allergies reviewed: Yes  Medications reviewed: Yes    Medications: Medication refills not needed today.  Pharm[facility] into [id number] - [place], [place] - [address] [facility]    Clinical concerns: None    Zehra Wang RN              "

## 2020-08-21 NOTE — PROGRESS NOTES
Patient finished infusion with no complications.  Port de-accessed.  AVSS.  Patient left via ambulation.

## 2020-08-24 LAB — RESEARCH KIT COLLECTION: NORMAL

## 2020-08-25 DIAGNOSIS — C82.08 FOLLICULAR LYMPHOMA GRADE I, LYMPH NODES OF MULTIPLE SITES (H): ICD-10-CM

## 2020-08-25 PROCEDURE — U0003 INFECTIOUS AGENT DETECTION BY NUCLEIC ACID (DNA OR RNA); SEVERE ACUTE RESPIRATORY SYNDROME CORONAVIRUS 2 (SARS-COV-2) (CORONAVIRUS DISEASE [COVID-19]), AMPLIFIED PROBE TECHNIQUE, MAKING USE OF HIGH THROUGHPUT TECHNOLOGIES AS DESCRIBED BY CMS-2020-01-R: HCPCS | Performed by: INTERNAL MEDICINE

## 2020-08-26 ENCOUNTER — TELEPHONE (OUTPATIENT)
Dept: TRANSPLANT | Facility: CLINIC | Age: 55
End: 2020-08-26

## 2020-08-26 LAB
SA1 CELL: NORMAL
SA1 COMMENTS: NORMAL
SA1 HI RISK ABY: NORMAL
SA1 MOD RISK ABY: NORMAL
SA1 TEST METHOD: NORMAL
SA2 CELL: NORMAL
SA2 COMMENTS: NORMAL
SA2 HI RISK ABY UA: NORMAL
SA2 MOD RISK ABY: NORMAL
SA2 TEST METHOD: NORMAL
SARS-COV-2 RNA SPEC QL NAA+PROBE: NOT DETECTED
SCR 1 TEST METHOD: NORMAL
SCR1 CELL: NORMAL
SCR1 COMMENTS: NORMAL
SCR1 RESULT: NORMAL
SCR2 CELL: NORMAL
SCR2 COMMENTS: NORMAL
SCR2 RESULT: NORMAL
SCR2 TEST METHOD: NORMAL
SPECIMEN SOURCE: NORMAL

## 2020-08-26 NOTE — TELEPHONE ENCOUNTER
PT: Juvenal Blake  : 1965  MRN: [id number]  Dx: [facility]  Protocol: 46  TBI: None  BMT Admission: 20 @10:30AM  RAC: PICC check-in 9:15AM GWR, 9:30AM procedure

## 2020-08-26 NOTE — PROGRESS NOTES
"      August 21, 2020     Due to COVID-19, every effort has been made by the research team at the [facility] Clinical Trials Office to limit in-person patient contact to protect both patients and the [facility] medical community. After reviewing the medical record of Juvenal Blake it was determined the research coordinators presence at the visit was not essential to the \"safe\" delivery of patient care. The provider caring for the patient was contacted and notified of needs for the visit.    [id number]: Study Visit Note   Subject name: Juvenal Blake     Visit: Day -10    Did the study visit occur within the appropriate window allowed by the protocol? yes    He is starting on the study with D-10 rituxan today. There are     I have personally interviewed Juvenal Blake and reviewed his medical record for adverse events and concomitant medications and these have been recorded on the corresponding logs in Juvenal Blake's research file.     Juvenal Blake was given the opportunity to ask any trial related questions.  Please see provider progress note for physical exam and other clinical information. Labs were reviewed - any significant lab values were addressed and reviewed.    Mr. Blake was assessed for the following targeted toxicities:    Infusion related reaction: 0  Dyspnea: 1  Hypoxia: 0  Fever: 0  Chills: 0  Hypertension: 2  Hypotension: 0  Edema: 0  Pneumonitis: 0  Rash: 0    Estrella Solomon RN    "

## 2020-08-27 ENCOUNTER — ONCOLOGY VISIT (OUTPATIENT)
Dept: TRANSPLANT | Facility: CLINIC | Age: 55
End: 2020-08-27
Attending: PHYSICIAN ASSISTANT
Payer: COMMERCIAL

## 2020-08-27 ENCOUNTER — APPOINTMENT (OUTPATIENT)
Dept: LAB | Facility: CLINIC | Age: 55
End: 2020-08-27
Attending: PHYSICIAN ASSISTANT
Payer: COMMERCIAL

## 2020-08-27 ENCOUNTER — RECORDS - HEALTHEAST (OUTPATIENT)
Dept: ADMINISTRATIVE | Facility: OTHER | Age: 55
End: 2020-08-27

## 2020-08-27 ENCOUNTER — INFUSION THERAPY VISIT (OUTPATIENT)
Dept: TRANSPLANT | Facility: CLINIC | Age: 55
End: 2020-08-27
Attending: INTERNAL MEDICINE
Payer: COMMERCIAL

## 2020-08-27 VITALS
OXYGEN SATURATION: 98 % | RESPIRATION RATE: 16 BRPM | DIASTOLIC BLOOD PRESSURE: 74 MMHG | HEART RATE: 64 BPM | SYSTOLIC BLOOD PRESSURE: 151 MMHG | WEIGHT: 251.2 LBS | BODY MASS INDEX: 38.19 KG/M2 | TEMPERATURE: 98.5 F

## 2020-08-27 DIAGNOSIS — C82.08 FOLLICULAR LYMPHOMA GRADE I, LYMPH NODES OF MULTIPLE SITES (H): Primary | ICD-10-CM

## 2020-08-27 LAB
ALBUMIN SERPL-MCNC: 3.4 G/DL (ref 3.4–5)
ALP SERPL-CCNC: 75 U/L (ref 40–150)
ALT SERPL W P-5'-P-CCNC: 40 U/L (ref 0–70)
ANION GAP SERPL CALCULATED.3IONS-SCNC: 5 MMOL/L (ref 3–14)
AST SERPL W P-5'-P-CCNC: 15 U/L (ref 0–45)
BACTERIA SPEC CULT: NO GROWTH
BASOPHILS # BLD AUTO: 0 10E9/L (ref 0–0.2)
BASOPHILS NFR BLD AUTO: 0.5 %
BILIRUB SERPL-MCNC: 0.5 MG/DL (ref 0.2–1.3)
BUN SERPL-MCNC: 17 MG/DL (ref 7–30)
CALCIUM SERPL-MCNC: 8.6 MG/DL (ref 8.5–10.1)
CHLORIDE SERPL-SCNC: 107 MMOL/L (ref 94–109)
CO2 SERPL-SCNC: 27 MMOL/L (ref 20–32)
CREAT SERPL-MCNC: 1.1 MG/DL (ref 0.66–1.25)
DIFFERENTIAL METHOD BLD: ABNORMAL
EOSINOPHIL # BLD AUTO: 0.2 10E9/L (ref 0–0.7)
EOSINOPHIL NFR BLD AUTO: 2.4 %
ERYTHROCYTE [DISTWIDTH] IN BLOOD BY AUTOMATED COUNT: 13.9 % (ref 10–15)
GFR SERPL CREATININE-BSD FRML MDRD: 74 ML/MIN/{1.73_M2}
GLUCOSE SERPL-MCNC: 132 MG/DL (ref 70–99)
HCT VFR BLD AUTO: 37.6 % (ref 40–53)
HGB BLD-MCNC: 12.4 G/DL (ref 13.3–17.7)
IMM GRANULOCYTES # BLD: 0 10E9/L (ref 0–0.4)
IMM GRANULOCYTES NFR BLD: 0.6 %
LYMPHOCYTES # BLD AUTO: 0.4 10E9/L (ref 0.8–5.3)
LYMPHOCYTES NFR BLD AUTO: 6.3 %
MCH RBC QN AUTO: 30.5 PG (ref 26.5–33)
MCHC RBC AUTO-ENTMCNC: 33 G/DL (ref 31.5–36.5)
MCV RBC AUTO: 92 FL (ref 78–100)
MONOCYTES # BLD AUTO: 0.5 10E9/L (ref 0–1.3)
MONOCYTES NFR BLD AUTO: 7.2 %
NEUTROPHILS # BLD AUTO: 5.3 10E9/L (ref 1.6–8.3)
NEUTROPHILS NFR BLD AUTO: 83 %
NRBC # BLD AUTO: 0 10*3/UL
NRBC BLD AUTO-RTO: 0 /100
PLATELET # BLD AUTO: 144 10E9/L (ref 150–450)
POTASSIUM SERPL-SCNC: 4 MMOL/L (ref 3.4–5.3)
PROT SERPL-MCNC: 6.3 G/DL (ref 6.8–8.8)
RBC # BLD AUTO: 4.07 10E12/L (ref 4.4–5.9)
SODIUM SERPL-SCNC: 139 MMOL/L (ref 133–144)
SPECIMEN SOURCE: NORMAL
WBC # BLD AUTO: 6.4 10E9/L (ref 4–11)

## 2020-08-27 PROCEDURE — 96375 TX/PRO/DX INJ NEW DRUG ADDON: CPT

## 2020-08-27 PROCEDURE — 96417 CHEMO IV INFUS EACH ADDL SEQ: CPT

## 2020-08-27 PROCEDURE — 96413 CHEMO IV INFUSION 1 HR: CPT

## 2020-08-27 PROCEDURE — 25000128 H RX IP 250 OP 636: Mod: ZF

## 2020-08-27 PROCEDURE — 25000128 H RX IP 250 OP 636: Mod: ZF | Performed by: PHYSICIAN ASSISTANT

## 2020-08-27 PROCEDURE — 80053 COMPREHEN METABOLIC PANEL: CPT

## 2020-08-27 PROCEDURE — 85025 COMPLETE CBC W/AUTO DIFF WBC: CPT

## 2020-08-27 PROCEDURE — 25800030 ZZH RX IP 258 OP 636: Mod: ZF

## 2020-08-27 PROCEDURE — 96361 HYDRATE IV INFUSION ADD-ON: CPT

## 2020-08-27 PROCEDURE — G0463 HOSPITAL OUTPT CLINIC VISIT: HCPCS | Mod: 25

## 2020-08-27 PROCEDURE — 25800030 ZZH RX IP 258 OP 636: Mod: ZF | Performed by: PHYSICIAN ASSISTANT

## 2020-08-27 PROCEDURE — 96415 CHEMO IV INFUSION ADDL HR: CPT

## 2020-08-27 RX ORDER — SULFAMETHOXAZOLE/TRIMETHOPRIM 800-160 MG
1 TABLET ORAL
Qty: 16 TABLET | Refills: 4 | Status: SHIPPED | OUTPATIENT
Start: 2020-08-31 | End: 2020-11-06

## 2020-08-27 RX ORDER — GRANISETRON HYDROCHLORIDE 1 MG/ML
1 INJECTION INTRAVENOUS ONCE
Status: COMPLETED | OUTPATIENT
Start: 2020-08-27 | End: 2020-08-27

## 2020-08-27 RX ORDER — ALLOPURINOL 300 MG/1
300 TABLET ORAL DAILY
Qty: 30 TABLET | Refills: 0 | Status: ON HOLD | OUTPATIENT
Start: 2020-08-27 | End: 2020-09-04

## 2020-08-27 RX ORDER — MULTIVIT,IRON,MINERALS/LUTEIN
1 TABLET ORAL DAILY
Status: ON HOLD | COMMUNITY
Start: 2020-08-19 | End: 2020-09-04

## 2020-08-27 RX ORDER — GRANISETRON HYDROCHLORIDE 1 MG/ML
1 INJECTION INTRAVENOUS ONCE
Status: CANCELLED
Start: 2020-08-27

## 2020-08-27 RX ORDER — HEPARIN SODIUM (PORCINE) LOCK FLUSH IV SOLN 100 UNIT/ML 100 UNIT/ML
5 SOLUTION INTRAVENOUS ONCE
Status: COMPLETED | OUTPATIENT
Start: 2020-08-27 | End: 2020-08-27

## 2020-08-27 RX ORDER — PROCHLORPERAZINE MALEATE 10 MG
10 TABLET ORAL EVERY 6 HOURS PRN
Qty: 30 TABLET | Refills: 0 | Status: SHIPPED | OUTPATIENT
Start: 2020-08-27 | End: 2020-08-28

## 2020-08-27 RX ORDER — DEXAMETHASONE SODIUM PHOSPHATE 4 MG/ML
4 INJECTION, SOLUTION INTRA-ARTICULAR; INTRALESIONAL; INTRAMUSCULAR; INTRAVENOUS; SOFT TISSUE ONCE
Status: CANCELLED
Start: 2020-08-27

## 2020-08-27 RX ORDER — LORAZEPAM 0.5 MG/1
0.5 TABLET ORAL EVERY 4 HOURS PRN
Qty: 30 TABLET | Refills: 0 | Status: SHIPPED | OUTPATIENT
Start: 2020-08-27 | End: 2020-11-17

## 2020-08-27 RX ADMIN — SODIUM CHLORIDE 700 ML: 9 INJECTION, SOLUTION INTRAVENOUS at 08:33

## 2020-08-27 RX ADMIN — Medication 5 ML: at 08:17

## 2020-08-27 RX ADMIN — SODIUM CHLORIDE 700 ML: 9 INJECTION, SOLUTION INTRAVENOUS at 14:04

## 2020-08-27 RX ADMIN — FLUDARABINE PHOSPHATE 64 MG: 25 INJECTION, SOLUTION INTRAVENOUS at 10:30

## 2020-08-27 RX ADMIN — GRANISETRON HYDROCHLORIDE 1 MG: 1 INJECTION, SOLUTION INTRAVENOUS at 09:38

## 2020-08-27 RX ADMIN — CYCLOPHOSPHAMIDE 850 MG: 1 INJECTION, POWDER, FOR SOLUTION INTRAVENOUS; ORAL at 12:00

## 2020-08-27 RX ADMIN — DEXAMETHASONE SODIUM PHOSPHATE 4 MG: 10 INJECTION, SOLUTION INTRAMUSCULAR; INTRAVENOUS at 09:38

## 2020-08-27 ASSESSMENT — PAIN SCALES - GENERAL: PAINLEVEL: MODERATE PAIN (4)

## 2020-08-27 NOTE — NURSING NOTE
"Chief Complaint   Patient presents with     Port Draw     port accessed and labs drawn by rn in lab.  vital signs taken.     Port accessed by RN in lab with 20g 3/4\" gripper needle, labs drawn, port flushed with saline and heparin, vitals checked, pt checked in for next appointment.    Rebecca Jessica RN      "

## 2020-08-27 NOTE — NURSING NOTE
Chief Complaint   Patient presents with     Infusion     Flu/cy day 1, hx follicular lymphoma.

## 2020-08-27 NOTE — PROGRESS NOTES
Juvenal Blake is a 54 year old male with relapsed follicular lymphoma undergoing NAM NK treatment    HISTORY OF CANCER:  He was originally diagnosed in 2010 with a grade I-II follicular lymphoma.  He was stage IV at diagnosis with marrow involvement.  He was treated with bendamustine and Rituxan and achieved a CR.  He then unfortunately relapsed in 12/2016 with a low-grade follicular lymphoma again.  He was again treated with Rituxan and bendamustine and again had achieved a complete remission.  He had a second relapse then in 01/2019 for which he was treated with O-CHOP and again achieved a CR.  Imaging in 04/2020 showed adenopathy above and below the diaphragm.  He had an axillary node biopsied and this again showed a grade I-II follicular lymphoma which expressed CD19 and CD20.  The Ki-67 was 20%.       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  His most recent PSA was low, and he has had no recurrent symptoms.  Interestingly, he also has a history of hypogammaglobulinemia and has received intermittent doses of IVIG over the last year and a half or so.       Interim hx:  He is doing ok today.  He has no new medical complaints today.  He is afebrile.  His cough and SAMS are stable (long standing).   He is ready to flu/cy.  No recent F/C/NS.  No new LAD per his report.  No n/v/d.    ROS:  A 10 point review of systems was negative other than noted above.     On exam:  Blood pressure (!) 151/74, pulse 64, temperature 98.5  F (36.9  C), temperature source Oral, resp. rate 16, weight 113.9 kg (251 lb 3.2 oz), SpO2 98 %.    Wt Readings from Last 4 Encounters:   08/27/20 113.9 kg (251 lb 3.2 oz)   08/21/20 112.4 kg (247 lb 11.2 oz)   08/12/20 111.3 kg (245 lb 6.4 oz)   07/28/20 109.3 kg (241 lb)     HEENT: PERRL, EOMI, MMM  Chest: CTAB  Cvs: S1 S2 RRR, no  murmurs or gallops  LAD:  Appreciable LN/fullness R groin (not examined today).  B/L axillary fullness.  No discrete nodes appreciated.  CNS: non focal      A/P:    54 years old male with relapsed follicular lymphoma here for rituxan pre NAM NK therapy.     Day -10 rituxan (given on day -11: 8/21)   8/27: cy/flu   8/28: cy/flu   8/29 (on 5C): cy/flu rituxan   9/1 and 9/3: Admit for NAM-NK cells and IL-2   9/5: IL-2, discharge   9/11: Day +11 rituxan (given on day +10)       Per protocol continue ACV and start bactrim (pcp prophy) and allopurinol today.  Pt will RTC tomorrow for chemo then knows to report to 5C at 8am on Saturday for chemo      Tasia Liu pa-c  [phone]

## 2020-08-27 NOTE — LETTER
8/27/2020         RE: Juvenla Blake  [address] [place] [address]        Dear Colleague,    Thank you for referring your patient, Juvenal Blake, to the [facility]. Please see a copy of my visit note below.    Infusion Nursing Note:  Juvenal Blake presents today for LD chemo.    Patient seen by provider today: Yes: Tasia Liu   present during visit today: Not Applicable.    Note: Patient arrives for D-5 LD chemo in preparation for NK therapy.  Preflush of 700mls NS given along with IVP kytril and dex.  Fludara over 1 hour, cytoxan over 2 hours with positive blood return noted before and after each medication.  Post flush of 700mls NS given and encouraged PO intake tonight and tomorrow.    Intravenous Access:  Implanted Port.    Treatment Conditions:  Lab Results   Component Value Date    HGB 12.4 08/27/2020     Lab Results   Component Value Date    WBC 6.4 08/27/2020      Lab Results   Component Value Date    ANEU 5.3 08/27/2020     Lab Results   Component Value Date     08/27/2020      Results reviewed, labs MET treatment parameters, ok to proceed with treatment.      Post Infusion Assessment:  Patient tolerated infusion without incident.  Blood return noted pre and post infusion.  No evidence of extravasations.  Access discontinued per protocol.       Discharge Plan:   Patient and/or family verbalized understanding of discharge instructions and all questions answered.  Patient discharged in stable condition accompanied by: self.  Departure Mode: Ambulatory.    Sarah Su RN                          Again, thank you for allowing me to participate in the care of your patient.        Sincerely,        [facility]

## 2020-08-27 NOTE — LETTER
8/27/2020         RE: Juvenal Blake  [facility] [place] [address]        Dear Colleague,    Thank you for referring your patient, Juvenal Blake, to the [facility]. Please see a copy of my visit note below.    Juvenal Blake is a 54 year old male with relapsed follicular lymphoma undergoing NAM NK treatment    HISTORY OF CANCER:  He was originally diagnosed in 2010 with a grade I-II follicular lymphoma.  He was stage IV at diagnosis with marrow involvement.  He was treated with bendamustine and Rituxan and achieved a CR.  He then unfortunately relapsed in 12/2016 with a low-grade follicular lymphoma again.  He was again treated with Rituxan and bendamustine and again had achieved a complete remission.  He had a second relapse then in 01/2019 for which he was treated with O-CHOP and again achieved a CR.  Imaging in 04/2020 showed adenopathy above and below the diaphragm.  He had an axillary node biopsied and this again showed a grade I-II follicular lymphoma which expressed CD19 and CD20.  The Ki-67 was 20%.       PAST MEDICAL HISTORY:  Remarkable for Bullhead 7 high-risk prostate cancer with invasion of the capsule but negative nodes, for which he was treated with prostatectomy in 2017 and then subsequent radiation.  His most recent PSA was low, and he has had no recurrent symptoms.  Interestingly, he also has a history of hypogammaglobulinemia and has received intermittent doses of IVIG over the last year and a half or so.       Interim hx:  He is doing ok today.  He has no new medical complaints today.  He is afebrile.  His cough and SAMS are stable (long standing).   He is ready to flu/cy.  No recent F/C/NS.  No new LAD per his report.  No n/v/d.    ROS:  A 10 point review of systems was negative other than noted above.     On exam:  Blood pressure (!) 151/74, pulse 64, temperature 98.5  F (36.9  C), temperature source Oral, resp. rate 16, weight 113.9 kg (251 lb 3.2 oz),  SpO2 98 %.    Wt Readings from Last 4 Encounters:   08/27/20 113.9 kg (251 lb 3.2 oz)   08/21/20 112.4 kg (247 lb 11.2 oz)   08/12/20 111.3 kg (245 lb 6.4 oz)   07/28/20 109.3 kg (241 lb)     HEENT: PERRL, EOMI, MMM  Chest: CTAB  Cvs: S1 S2 RRR, no murmurs or gallops  LAD:  Appreciable LN/fullness R groin (not examined today).  B/L axillary fullness.  No discrete nodes appreciated.  CNS: non focal      A/P:    54 years old male with relapsed follicular lymphoma here for rituxan pre NAM NK therapy.     Day -10 rituxan (given on day -11: 8/21)   8/27: cy/flu   8/28: cy/flu   8/29 (on 5C): cy/flu rituxan   9/1 and 9/3: Admit for NAM-NK cells and IL-2   9/5: IL-2, discharge   9/11: Day +11 rituxan (given on day +10)       Per protocol continue ACV and start bactrim (pcp prophy) and allopurinol today.  Pt will RTC tomorrow for chemo then knows to report to 5C at 8am on Saturday for chemo      Tasia Liu pa-c  [phone]      Again, thank you for allowing me to participate in the care of your patient.        Sincerely,        [facility] Advanced Practice Provider

## 2020-08-27 NOTE — PROGRESS NOTES
Infusion Nursing Note:  Juvenal Blake presents today for LD chemo.    Patient seen by provider today: Yes: Tasia Liu   present during visit today: Not Applicable.    Note: Patient arrives for D-5 LD chemo in preparation for NK therapy.  Preflush of 700mls NS given along with IVP kytril and dex.  Fludara over 1 hour, cytoxan over 2 hours with positive blood return noted before and after each medication.  Post flush of 700mls NS given and encouraged PO intake tonight and tomorrow.    Intravenous Access:  Implanted Port.    Treatment Conditions:  Lab Results   Component Value Date    HGB 12.4 08/27/2020     Lab Results   Component Value Date    WBC 6.4 08/27/2020      Lab Results   Component Value Date    ANEU 5.3 08/27/2020     Lab Results   Component Value Date     08/27/2020      Results reviewed, labs MET treatment parameters, ok to proceed with treatment.      Post Infusion Assessment:  Patient tolerated infusion without incident.  Blood return noted pre and post infusion.  No evidence of extravasations.  Access discontinued per protocol.       Discharge Plan:   Patient and/or family verbalized understanding of discharge instructions and all questions answered.  Patient discharged in stable condition accompanied by: self.  Departure Mode: Ambulatory.    Sarah Su RN

## 2020-08-27 NOTE — NURSING NOTE
"Oncology Rooming Note    August 27, 2020 8:29 AM   Juvenal Blake is a 54 year old male who presents for:    Chief Complaint   Patient presents with     Port Draw     port accessed and labs drawn by rn in lab.  vital signs taken.     RECHECK     Provider visit, hx follicular lymphoma.     Initial Vitals: BP (!) 151/74 (BP Location: Right arm, Patient Position: Sitting, Cuff Size: Adult Large)   Pulse 64   Temp 98.5  F (36.9  C) (Oral)   Resp 16   Wt 113.9 kg (251 lb 3.2 oz)   SpO2 98%   BMI 38.19 kg/m   Estimated body mass index is 38.19 kg/m  as calculated from the following:    Height as of 7/28/20: 1.727 m (5' 8\").    Weight as of this encounter: 113.9 kg (251 lb 3.2 oz). Body surface area is 2.34 meters squared.  Moderate Pain (4) Comment: Data Unavailable   No LMP for male patient.  Allergies reviewed: Yes  Medications reviewed: Yes    Medications: Medication refills not needed today.  Pharmacy name entered into [employer]: [facility] #[id number] - [place], [place] - [address] [facility]    Clinical concerns: None      Sarah Su RN              "

## 2020-08-28 ENCOUNTER — RECORDS - HEALTHEAST (OUTPATIENT)
Dept: ADMINISTRATIVE | Facility: OTHER | Age: 55
End: 2020-08-28

## 2020-08-28 ENCOUNTER — ONCOLOGY VISIT (OUTPATIENT)
Dept: TRANSPLANT | Facility: CLINIC | Age: 55
End: 2020-08-28
Attending: PHYSICIAN ASSISTANT
Payer: COMMERCIAL

## 2020-08-28 ENCOUNTER — APPOINTMENT (OUTPATIENT)
Dept: LAB | Facility: CLINIC | Age: 55
End: 2020-08-28
Attending: PHYSICIAN ASSISTANT
Payer: COMMERCIAL

## 2020-08-28 VITALS
DIASTOLIC BLOOD PRESSURE: 83 MMHG | SYSTOLIC BLOOD PRESSURE: 147 MMHG | HEART RATE: 56 BPM | RESPIRATION RATE: 20 BRPM | OXYGEN SATURATION: 97 %

## 2020-08-28 VITALS
TEMPERATURE: 97.7 F | HEART RATE: 70 BPM | OXYGEN SATURATION: 98 % | BODY MASS INDEX: 38.28 KG/M2 | SYSTOLIC BLOOD PRESSURE: 120 MMHG | DIASTOLIC BLOOD PRESSURE: 76 MMHG | WEIGHT: 251.77 LBS | RESPIRATION RATE: 16 BRPM

## 2020-08-28 DIAGNOSIS — C82.08 FOLLICULAR LYMPHOMA GRADE I, LYMPH NODES OF MULTIPLE SITES (H): Primary | ICD-10-CM

## 2020-08-28 DIAGNOSIS — C82.08 FOLLICULAR LYMPHOMA GRADE I, LYMPH NODES OF MULTIPLE SITES (H): ICD-10-CM

## 2020-08-28 LAB
ANION GAP SERPL CALCULATED.3IONS-SCNC: 5 MMOL/L (ref 3–14)
BUN SERPL-MCNC: 13 MG/DL (ref 7–30)
CALCIUM SERPL-MCNC: 8.8 MG/DL (ref 8.5–10.1)
CHLORIDE SERPL-SCNC: 107 MMOL/L (ref 94–109)
CO2 SERPL-SCNC: 28 MMOL/L (ref 20–32)
CREAT SERPL-MCNC: 0.97 MG/DL (ref 0.66–1.25)
DIFFERENTIAL METHOD BLD: ABNORMAL
EOSINOPHIL # BLD AUTO: 0.1 10E9/L (ref 0–0.7)
EOSINOPHIL NFR BLD AUTO: 1 %
ERYTHROCYTE [DISTWIDTH] IN BLOOD BY AUTOMATED COUNT: 13.8 % (ref 10–15)
GFR SERPL CREATININE-BSD FRML MDRD: 86 ML/MIN/{1.73_M2}
GLUCOSE SERPL-MCNC: 135 MG/DL (ref 70–99)
HCT VFR BLD AUTO: 35.6 % (ref 40–53)
HGB BLD-MCNC: 11.7 G/DL (ref 13.3–17.7)
LYMPHOCYTES # BLD AUTO: 0.4 10E9/L (ref 0.8–5.3)
LYMPHOCYTES NFR BLD AUTO: 6 %
MCH RBC QN AUTO: 30.3 PG (ref 26.5–33)
MCHC RBC AUTO-ENTMCNC: 32.9 G/DL (ref 31.5–36.5)
MCV RBC AUTO: 92 FL (ref 78–100)
MONOCYTES # BLD AUTO: 0.3 10E9/L (ref 0–1.3)
MONOCYTES NFR BLD AUTO: 5 %
NEUTROPHILS # BLD AUTO: 5.6 10E9/L (ref 1.6–8.3)
NEUTROPHILS NFR BLD AUTO: 88 %
PLATELET # BLD AUTO: 153 10E9/L (ref 150–450)
POTASSIUM SERPL-SCNC: 3.6 MMOL/L (ref 3.4–5.3)
RBC # BLD AUTO: 3.86 10E12/L (ref 4.4–5.9)
SODIUM SERPL-SCNC: 140 MMOL/L (ref 133–144)
WBC # BLD AUTO: 6.4 10E9/L (ref 4–11)

## 2020-08-28 PROCEDURE — 96413 CHEMO IV INFUSION 1 HR: CPT

## 2020-08-28 PROCEDURE — 96375 TX/PRO/DX INJ NEW DRUG ADDON: CPT

## 2020-08-28 PROCEDURE — 25000128 H RX IP 250 OP 636: Mod: ZF | Performed by: PHYSICIAN ASSISTANT

## 2020-08-28 PROCEDURE — 25800030 ZZH RX IP 258 OP 636: Mod: ZF | Performed by: PHYSICIAN ASSISTANT

## 2020-08-28 PROCEDURE — 25000128 H RX IP 250 OP 636: Mod: ZF | Performed by: STUDENT IN AN ORGANIZED HEALTH CARE EDUCATION/TRAINING PROGRAM

## 2020-08-28 PROCEDURE — 96417 CHEMO IV INFUS EACH ADDL SEQ: CPT

## 2020-08-28 PROCEDURE — 96367 TX/PROPH/DG ADDL SEQ IV INF: CPT

## 2020-08-28 PROCEDURE — 96361 HYDRATE IV INFUSION ADD-ON: CPT

## 2020-08-28 PROCEDURE — 85025 COMPLETE CBC W/AUTO DIFF WBC: CPT

## 2020-08-28 PROCEDURE — 80048 BASIC METABOLIC PNL TOTAL CA: CPT

## 2020-08-28 PROCEDURE — G0463 HOSPITAL OUTPT CLINIC VISIT: HCPCS | Mod: 25

## 2020-08-28 PROCEDURE — 96415 CHEMO IV INFUSION ADDL HR: CPT

## 2020-08-28 PROCEDURE — 25800030 ZZH RX IP 258 OP 636: Mod: ZF

## 2020-08-28 PROCEDURE — 25000125 ZZHC RX 250: Mod: ZF

## 2020-08-28 PROCEDURE — 25000128 H RX IP 250 OP 636: Mod: ZF

## 2020-08-28 RX ORDER — DEXAMETHASONE SODIUM PHOSPHATE 4 MG/ML
4 INJECTION, SOLUTION INTRA-ARTICULAR; INTRALESIONAL; INTRAMUSCULAR; INTRAVENOUS; SOFT TISSUE ONCE
Status: DISCONTINUED | OUTPATIENT
Start: 2020-08-28 | End: 2020-08-28

## 2020-08-28 RX ORDER — DEXAMETHASONE SODIUM PHOSPHATE 4 MG/ML
4 INJECTION, SOLUTION INTRA-ARTICULAR; INTRALESIONAL; INTRAMUSCULAR; INTRAVENOUS; SOFT TISSUE ONCE
Status: CANCELLED
Start: 2020-09-01

## 2020-08-28 RX ORDER — PROCHLORPERAZINE MALEATE 10 MG
10 TABLET ORAL EVERY 6 HOURS PRN
Qty: 30 TABLET | Refills: 0 | Status: ON HOLD | OUTPATIENT
Start: 2020-08-28 | End: 2020-09-04

## 2020-08-28 RX ORDER — LORAZEPAM 2 MG/ML
0.5 INJECTION INTRAMUSCULAR EVERY 4 HOURS PRN
Status: DISCONTINUED | OUTPATIENT
Start: 2020-08-28 | End: 2020-08-28 | Stop reason: HOSPADM

## 2020-08-28 RX ORDER — GRANISETRON HYDROCHLORIDE 1 MG/ML
1 INJECTION INTRAVENOUS ONCE
Status: COMPLETED | OUTPATIENT
Start: 2020-08-28 | End: 2020-08-28

## 2020-08-28 RX ORDER — GRANISETRON HYDROCHLORIDE 1 MG/ML
1 INJECTION INTRAVENOUS ONCE
Status: CANCELLED
Start: 2020-09-01

## 2020-08-28 RX ORDER — GRANISETRON HYDROCHLORIDE 1 MG/ML
1 INJECTION INTRAVENOUS ONCE
Status: CANCELLED
Start: 2020-08-29

## 2020-08-28 RX ORDER — DEXAMETHASONE SODIUM PHOSPHATE 4 MG/ML
4 INJECTION, SOLUTION INTRA-ARTICULAR; INTRALESIONAL; INTRAMUSCULAR; INTRAVENOUS; SOFT TISSUE ONCE
Status: CANCELLED
Start: 2020-08-29

## 2020-08-28 RX ORDER — HEPARIN SODIUM (PORCINE) LOCK FLUSH IV SOLN 100 UNIT/ML 100 UNIT/ML
5 SOLUTION INTRAVENOUS ONCE
Status: COMPLETED | OUTPATIENT
Start: 2020-08-28 | End: 2020-08-28

## 2020-08-28 RX ORDER — HEPARIN SODIUM (PORCINE) LOCK FLUSH IV SOLN 100 UNIT/ML 100 UNIT/ML
5 SOLUTION INTRAVENOUS EVERY 8 HOURS
Status: DISCONTINUED | OUTPATIENT
Start: 2020-08-28 | End: 2020-08-28 | Stop reason: HOSPADM

## 2020-08-28 RX ORDER — LORAZEPAM 0.5 MG/1
0.5 TABLET ORAL EVERY 6 HOURS PRN
Qty: 30 TABLET | Refills: 0 | Status: ON HOLD | OUTPATIENT
Start: 2020-08-28 | End: 2020-09-04

## 2020-08-28 RX ADMIN — Medication 20 MG: at 09:39

## 2020-08-28 RX ADMIN — Medication 5 ML: at 14:44

## 2020-08-28 RX ADMIN — SODIUM CHLORIDE 700 ML: 9 INJECTION, SOLUTION INTRAVENOUS at 09:00

## 2020-08-28 RX ADMIN — CYCLOPHOSPHAMIDE 850 MG: 1 INJECTION, POWDER, FOR SOLUTION INTRAVENOUS; ORAL at 11:34

## 2020-08-28 RX ADMIN — DEXAMETHASONE SODIUM PHOSPHATE 4 MG: 10 INJECTION, SOLUTION INTRAMUSCULAR; INTRAVENOUS at 09:06

## 2020-08-28 RX ADMIN — SODIUM CHLORIDE 700 ML: 9 INJECTION, SOLUTION INTRAVENOUS at 12:38

## 2020-08-28 RX ADMIN — GRANISETRON HYDROCHLORIDE 1 MG: 1 INJECTION, SOLUTION INTRAVENOUS at 09:00

## 2020-08-28 RX ADMIN — Medication 5 ML: at 08:18

## 2020-08-28 RX ADMIN — LORAZEPAM 0.5 MG: 2 INJECTION INTRAMUSCULAR; INTRAVENOUS at 09:35

## 2020-08-28 RX ADMIN — FLUDARABINE PHOSPHATE 64 MG: 25 INJECTION, SOLUTION INTRAVENOUS at 10:08

## 2020-08-28 ASSESSMENT — PAIN SCALES - GENERAL: PAINLEVEL: MILD PAIN (2)

## 2020-08-28 NOTE — LETTER
8/28/2020         RE: Juvenal Blake  [address] [place] [address]        Dear Colleague,    Thank you for referring your patient, Juvenal Blake, to the [facility]ARR[facility]PL[facility]. Please see a copy of my visit note below.    Infusion Nursing Note:  Juvenal Blake presents today for chemotherapy infusion HX: Lymphoma    Patient seen by provider today: Yes: Tasia GUZMAN   present during visit today: Not Applicable.    Note: Pt arrived,ambulatory, in care of himself.  Pt denies emesis and diarrhea overnight, but does report current mild nausea.  Provider assessment completed.  Will administer all anti-emetics ordered as chemo premeds.  Will continue to monitor pt's comfort closely and also tolerance of infusion of chemotherapy.  Call light w/in reach.      Intravenous Access:  Implanted Port. Excellent blood return obtained from port prior to chemo infusions.  Port flushed with saline and heparin upon completion of use.  Port de-accessed.  Site WNL.  Primapore dressing applied.    Treatment Conditions:  Results reviewed, labs MET treatment parameters, ok to proceed with treatment.      Post Infusion Assessment:  Patient tolerated infusions of chemotherapy without incident. Pt also received IVF pre and post chemotherapy as ordered. Pt able to eat multiple snacks throughout the visit, no emesis or breakthrough nausea reported or noted by this RN.  Pt voiding multiple times throughout infusions.      Discharge Plan:   Patient discharged in stable condition accompanied by: self.  Pt to admit to hospital tomorrow for additional infusion of chemo over the weekend.  Pt to  scripts for Ativan/Compazine today.  Verbal and written teaching provided on how to self administer these medications.  Pt also encouraged to increase oral fluid intake as outpatient.    Itzel Zavaleta RN                          Again, thank you for allowing me to participate in the care of your patient.         Sincerely,        [facility]

## 2020-08-28 NOTE — PROGRESS NOTES
Infusion Nursing Note:  Juvenal Blake presents today for chemotherapy infusion HX: Lymphoma    Patient seen by provider today: Yes: Tasia GUZMAN   present during visit today: Not Applicable.    Note: Pt arrived,ambulatory, in care of himself.  Pt denies emesis and diarrhea overnight, but does report current mild nausea.  Provider assessment completed.  Will administer all anti-emetics ordered as chemo premeds.  Will continue to monitor pt's comfort closely and also tolerance of infusion of chemotherapy.  Call light w/in reach.      Intravenous Access:  Implanted Port. Excellent blood return obtained from port prior to chemo infusions.  Port flushed with saline and heparin upon completion of use.  Port de-accessed.  Site WNL.  Primapore dressing applied.    Treatment Conditions:  Results reviewed, labs MET treatment parameters, ok to proceed with treatment.      Post Infusion Assessment:  Patient tolerated infusions of chemotherapy without incident. Pt also received IVF pre and post chemotherapy as ordered. Pt able to eat multiple snacks throughout the visit, no emesis or breakthrough nausea reported or noted by this RN.  Pt voiding multiple times throughout infusions.      Discharge Plan:   Patient discharged in stable condition accompanied by: self.  Pt to admit to hospital tomorrow for additional infusion of chemo over the weekend.  Pt to  scripts for Ativan/Compazine today.  Verbal and written teaching provided on how to self administer these medications.  Pt also encouraged to increase oral fluid intake as outpatient.    Itzel Zavaleta RN

## 2020-08-28 NOTE — NURSING NOTE
Chief Complaint   Patient presents with     Port Draw     Labs drawn via port, follicular lymphoma     Labs drawn via port a cath.  Flushed with saline and heparin.  Covered with transparent dressing.  VS done.  Pt tolerated well.    Zehra Wnag RN

## 2020-08-28 NOTE — NURSING NOTE
"Oncology Rooming Note    August 28, 2020 9:29 AM   Juvenal Blake is a 54 year old male who presents for:    Chief Complaint   Patient presents with     Infusion     here for infusion of IVF and chemotherapy HX: Lymphoma     Initial Vitals: There were no vitals taken for this visit. Estimated body mass index is 38.28 kg/m  as calculated from the following:    Height as of 7/28/20: 1.727 m (5' 8\").    Weight as of an earlier encounter on 8/28/20: 114.2 kg (251 lb 12.3 oz). There is no height or weight on file to calculate BSA.  Data Unavailable Comment: Data Unavailable   No LMP for male patient.  Allergies reviewed: Yes  Medications reviewed: Yes    Medications: MEDICATION REFILLS NEEDED TODAY. Provider was notified.  Pharmacy name entered into [employer]: [facility] #[id number] - [place], [place] - [address] [facility]    Clinical concerns: PT here for infusion of day -4 chemotherapy.  Pt reports slight nausea but denies emesis or diarrhea over night.  Pt to receive IV premeds for nausea prior to chemo doses today.  Provider assessment completed at bedside.  Scripts sent for antiemetics to be picked up upon completion of therapy today.      Itzel Zavaleta RN              "

## 2020-08-28 NOTE — LETTER
8/28/2020         RE: Juvenal Blake  [facility] [place] [address]        Dear Colleague,    Thank you for referring your patient, Juvenal Blake, to the [facility] BLOOD AND MARROW TRANSPLANT. Please see a copy of my visit note below.    Juvenal Blake is a 54 year old male with relapsed follicular lymphoma undergoing NAM NK treatment    Interim hx:  He is doing ok today.  He had some nausea post chemo yesterday.  No vomiting.  No diarrhea.  No other new medical complaints today.  He is afebrile.  His cough and SAMS are stable (long standing).    No recent F/C/NS.  No new LAD per his report.  He knows to present to  tomorrow at 8AM for his chemotherapy.    ROS:  A 10 point review of systems was negative other than noted above.     On exam:  Blood pressure 120/76, pulse 70, temperature 97.7  F (36.5  C), temperature source Oral, resp. rate 16, weight 114.2 kg (251 lb 12.3 oz), SpO2 98 %.    Wt Readings from Last 4 Encounters:   08/27/20 113.9 kg (251 lb 3.2 oz)   08/21/20 112.4 kg (247 lb 11.2 oz)   08/12/20 111.3 kg (245 lb 6.4 oz)   07/28/20 109.3 kg (241 lb)     HEENT: PERRL, EOMI, MMM  Chest: CTAB  Cvs: S1 S2 RRR, no murmurs or gallops  LAD:  Appreciable LN/fullness R groin (not examined today).  B/L axillary fullness.  No discrete nodes appreciated.  CNS: non focal      A/P:    54 years old male with relapsed follicular lymphoma here for rituxan pre NAM NK therapy.     Day -10 rituxan (given on day -11: 8/21)   8/27: cy/flu   8/28: cy/flu   8/29 (on ): cy/flu rituxan   9/1 and 9/3: Admit for NAM-NK cells and IL-2   9/5: IL-2, discharge   9/11: Day +11 rituxan (given on day +10)       Per protocol continue ACV, bactrim (pcp prophy) and allopurinol.    He will  prn ativan/compazine today.    Pt will report to  at 8am on Saturday for chemo      Tasia Liu pa-c  [phone]      Again, thank you for allowing me to participate in the care of your patient.        Sincerely,        BMT  Advanced Practice Provider

## 2020-08-28 NOTE — PROGRESS NOTES
Juvenal Blake is a 54 year old male with relapsed follicular lymphoma undergoing NAM NK treatment    Interim hx:  He is doing ok today.  He had some nausea post chemo yesterday.  No vomiting.  No diarrhea.  No other new medical complaints today.  He is afebrile.  His cough and SAMS are stable (long standing).    No recent F/C/NS.  No new LAD per his report.  He knows to present to  tomorrow at 8AM for his chemotherapy.    ROS:  A 10 point review of systems was negative other than noted above.     On exam:  Blood pressure 120/76, pulse 70, temperature 97.7  F (36.5  C), temperature source Oral, resp. rate 16, weight 114.2 kg (251 lb 12.3 oz), SpO2 98 %.    Wt Readings from Last 4 Encounters:   08/27/20 113.9 kg (251 lb 3.2 oz)   08/21/20 112.4 kg (247 lb 11.2 oz)   08/12/20 111.3 kg (245 lb 6.4 oz)   07/28/20 109.3 kg (241 lb)     HEENT: PERRL, EOMI, MMM  Chest: CTAB  Cvs: S1 S2 RRR, no murmurs or gallops  LAD:  Appreciable LN/fullness R groin (not examined today).  B/L axillary fullness.  No discrete nodes appreciated.  CNS: non focal      A/P:    54 years old male with relapsed follicular lymphoma here for rituxan pre NAM NK therapy.     Day -10 rituxan (given on day -11: 8/21)   8/27: cy/flu   8/28: cy/flu   8/29 (on 5C): cy/flu rituxan   9/1 and 9/3: Admit for NAM-NK cells and IL-2   9/5: IL-2, discharge   9/11: Day +11 rituxan (given on day +10)       Per protocol continue ACV, bactrim (pcp prophy) and allopurinol.    He will  prn ativan/compazine today.    Pt will report to  at 8am on Saturday for chemo      Tasia Liu pa-c  [phone]

## 2020-08-29 ENCOUNTER — HOSPITAL ENCOUNTER (OUTPATIENT)
Facility: CLINIC | Age: 55
Discharge: HOME | End: 2020-08-29
Payer: COMMERCIAL

## 2020-08-29 VITALS
DIASTOLIC BLOOD PRESSURE: 89 MMHG | WEIGHT: 251.2 LBS | TEMPERATURE: 97.5 F | BODY MASS INDEX: 38.07 KG/M2 | OXYGEN SATURATION: 98 % | RESPIRATION RATE: 16 BRPM | HEIGHT: 68 IN | HEART RATE: 67 BPM | SYSTOLIC BLOOD PRESSURE: 157 MMHG

## 2020-08-29 DIAGNOSIS — C82.08 FOLLICULAR LYMPHOMA GRADE I, LYMPH NODES OF MULTIPLE SITES (H): Primary | ICD-10-CM

## 2020-08-29 LAB
ANION GAP SERPL CALCULATED.3IONS-SCNC: 4 MMOL/L (ref 3–14)
BASOPHILS # BLD AUTO: 0 10E9/L (ref 0–0.2)
BASOPHILS NFR BLD AUTO: 0.2 %
BUN SERPL-MCNC: 13 MG/DL (ref 7–30)
CALCIUM SERPL-MCNC: 8.7 MG/DL (ref 8.5–10.1)
CHLORIDE SERPL-SCNC: 108 MMOL/L (ref 94–109)
CO2 SERPL-SCNC: 29 MMOL/L (ref 20–32)
CREAT SERPL-MCNC: 1.06 MG/DL (ref 0.66–1.25)
DIFFERENTIAL METHOD BLD: ABNORMAL
EOSINOPHIL # BLD AUTO: 0.2 10E9/L (ref 0–0.7)
EOSINOPHIL NFR BLD AUTO: 1.8 %
ERYTHROCYTE [DISTWIDTH] IN BLOOD BY AUTOMATED COUNT: 14 % (ref 10–15)
GFR SERPL CREATININE-BSD FRML MDRD: 78 ML/MIN/{1.73_M2}
GLUCOSE SERPL-MCNC: 83 MG/DL (ref 70–99)
HCT VFR BLD AUTO: 36.3 % (ref 40–53)
HGB BLD-MCNC: 11.9 G/DL (ref 13.3–17.7)
IMM GRANULOCYTES # BLD: 0 10E9/L (ref 0–0.4)
IMM GRANULOCYTES NFR BLD: 0.5 %
LYMPHOCYTES # BLD AUTO: 0.2 10E9/L (ref 0.8–5.3)
LYMPHOCYTES NFR BLD AUTO: 2.1 %
MCH RBC QN AUTO: 30.2 PG (ref 26.5–33)
MCHC RBC AUTO-ENTMCNC: 32.8 G/DL (ref 31.5–36.5)
MCV RBC AUTO: 92 FL (ref 78–100)
MONOCYTES # BLD AUTO: 0.4 10E9/L (ref 0–1.3)
MONOCYTES NFR BLD AUTO: 4.5 %
NEUTROPHILS # BLD AUTO: 7.4 10E9/L (ref 1.6–8.3)
NEUTROPHILS NFR BLD AUTO: 90.9 %
NRBC # BLD AUTO: 0 10*3/UL
NRBC BLD AUTO-RTO: 0 /100
PLATELET # BLD AUTO: 156 10E9/L (ref 150–450)
POTASSIUM SERPL-SCNC: 3.6 MMOL/L (ref 3.4–5.3)
RBC # BLD AUTO: 3.94 10E12/L (ref 4.4–5.9)
SODIUM SERPL-SCNC: 142 MMOL/L (ref 133–144)
WBC # BLD AUTO: 8.2 10E9/L (ref 4–11)

## 2020-08-29 PROCEDURE — U0003 INFECTIOUS AGENT DETECTION BY NUCLEIC ACID (DNA OR RNA); SEVERE ACUTE RESPIRATORY SYNDROME CORONAVIRUS 2 (SARS-COV-2) (CORONAVIRUS DISEASE [COVID-19]), AMPLIFIED PROBE TECHNIQUE, MAKING USE OF HIGH THROUGHPUT TECHNOLOGIES AS DESCRIBED BY CMS-2020-01-R: HCPCS | Performed by: PHYSICIAN ASSISTANT

## 2020-08-29 PROCEDURE — 96361 HYDRATE IV INFUSION ADD-ON: CPT

## 2020-08-29 PROCEDURE — 96413 CHEMO IV INFUSION 1 HR: CPT

## 2020-08-29 PROCEDURE — 96415 CHEMO IV INFUSION ADDL HR: CPT

## 2020-08-29 PROCEDURE — 25000132 ZZH RX MED GY IP 250 OP 250 PS 637

## 2020-08-29 PROCEDURE — 80048 BASIC METABOLIC PNL TOTAL CA: CPT

## 2020-08-29 PROCEDURE — 85025 COMPLETE CBC W/AUTO DIFF WBC: CPT

## 2020-08-29 PROCEDURE — 96417 CHEMO IV INFUS EACH ADDL SEQ: CPT

## 2020-08-29 PROCEDURE — 25800030 ZZH RX IP 258 OP 636

## 2020-08-29 PROCEDURE — 25000128 H RX IP 250 OP 636

## 2020-08-29 PROCEDURE — G0463 HOSPITAL OUTPT CLINIC VISIT: HCPCS | Mod: 25

## 2020-08-29 PROCEDURE — 96375 TX/PRO/DX INJ NEW DRUG ADDON: CPT

## 2020-08-29 RX ORDER — MEPERIDINE HYDROCHLORIDE 25 MG/ML
25 INJECTION INTRAMUSCULAR; INTRAVENOUS; SUBCUTANEOUS EVERY 30 MIN PRN
Status: DISCONTINUED | OUTPATIENT
Start: 2020-08-29 | End: 2020-08-30 | Stop reason: HOSPADM

## 2020-08-29 RX ORDER — GRANISETRON HYDROCHLORIDE 1 MG/ML
1 INJECTION INTRAVENOUS ONCE
Status: COMPLETED | OUTPATIENT
Start: 2020-08-29 | End: 2020-08-29

## 2020-08-29 RX ORDER — ALBUTEROL SULFATE 0.83 MG/ML
2.5 SOLUTION RESPIRATORY (INHALATION)
Status: DISCONTINUED | OUTPATIENT
Start: 2020-08-29 | End: 2020-08-30 | Stop reason: HOSPADM

## 2020-08-29 RX ORDER — DIPHENHYDRAMINE HYDROCHLORIDE 50 MG/ML
50 INJECTION INTRAMUSCULAR; INTRAVENOUS
Status: DISCONTINUED | OUTPATIENT
Start: 2020-08-29 | End: 2020-08-30 | Stop reason: HOSPADM

## 2020-08-29 RX ORDER — DIPHENHYDRAMINE HCL 25 MG
50 CAPSULE ORAL ONCE
Status: COMPLETED | OUTPATIENT
Start: 2020-08-29 | End: 2020-08-29

## 2020-08-29 RX ORDER — LORAZEPAM 2 MG/ML
0.5 INJECTION INTRAMUSCULAR EVERY 4 HOURS PRN
Status: DISCONTINUED | OUTPATIENT
Start: 2020-08-29 | End: 2020-08-30 | Stop reason: HOSPADM

## 2020-08-29 RX ORDER — SODIUM CHLORIDE 9 MG/ML
1000 INJECTION, SOLUTION INTRAVENOUS CONTINUOUS PRN
Status: DISCONTINUED | OUTPATIENT
Start: 2020-08-29 | End: 2020-08-30 | Stop reason: HOSPADM

## 2020-08-29 RX ORDER — DEXAMETHASONE SODIUM PHOSPHATE 4 MG/ML
4 INJECTION, SOLUTION INTRA-ARTICULAR; INTRALESIONAL; INTRAMUSCULAR; INTRAVENOUS; SOFT TISSUE ONCE
Status: COMPLETED | OUTPATIENT
Start: 2020-08-29 | End: 2020-08-29

## 2020-08-29 RX ORDER — ACETAMINOPHEN 325 MG/1
650 TABLET ORAL ONCE
Status: COMPLETED | OUTPATIENT
Start: 2020-08-29 | End: 2020-08-29

## 2020-08-29 RX ORDER — ALBUTEROL SULFATE 90 UG/1
1-2 AEROSOL, METERED RESPIRATORY (INHALATION)
Status: DISCONTINUED | OUTPATIENT
Start: 2020-08-29 | End: 2020-08-30 | Stop reason: HOSPADM

## 2020-08-29 RX ORDER — NALOXONE HYDROCHLORIDE 0.4 MG/ML
.1-.4 INJECTION, SOLUTION INTRAMUSCULAR; INTRAVENOUS; SUBCUTANEOUS
Status: DISCONTINUED | OUTPATIENT
Start: 2020-08-29 | End: 2020-08-30 | Stop reason: HOSPADM

## 2020-08-29 RX ORDER — EPINEPHRINE 1 MG/ML
0.3 INJECTION, SOLUTION, CONCENTRATE INTRAVENOUS EVERY 5 MIN PRN
Status: DISCONTINUED | OUTPATIENT
Start: 2020-08-29 | End: 2020-08-30 | Stop reason: HOSPADM

## 2020-08-29 RX ORDER — METHYLPREDNISOLONE SODIUM SUCCINATE 125 MG/2ML
125 INJECTION, POWDER, LYOPHILIZED, FOR SOLUTION INTRAMUSCULAR; INTRAVENOUS
Status: DISCONTINUED | OUTPATIENT
Start: 2020-08-29 | End: 2020-08-30 | Stop reason: HOSPADM

## 2020-08-29 RX ADMIN — SODIUM CHLORIDE 700 ML: 9 INJECTION, SOLUTION INTRAVENOUS at 09:56

## 2020-08-29 RX ADMIN — RITUXIMAB 900 MG: 10 INJECTION, SOLUTION INTRAVENOUS at 15:44

## 2020-08-29 RX ADMIN — SODIUM CHLORIDE 700 ML: 9 INJECTION, SOLUTION INTRAVENOUS at 15:44

## 2020-08-29 RX ADMIN — DEXAMETHASONE SODIUM PHOSPHATE 4 MG: 4 INJECTION, SOLUTION INTRAMUSCULAR; INTRAVENOUS at 11:24

## 2020-08-29 RX ADMIN — CYCLOPHOSPHAMIDE 850 MG: 2 INJECTION, POWDER, FOR SOLUTION INTRAVENOUS; ORAL at 13:02

## 2020-08-29 RX ADMIN — ACETAMINOPHEN 650 MG: 325 TABLET, FILM COATED ORAL at 15:08

## 2020-08-29 RX ADMIN — FLUDARABINE PHOSPHATE 64 MG: 25 INJECTION, SOLUTION INTRAVENOUS at 11:53

## 2020-08-29 RX ADMIN — DIPHENHYDRAMINE HYDROCHLORIDE 50 MG: 25 CAPSULE ORAL at 15:08

## 2020-08-29 RX ADMIN — GRANISETRON HYDROCHLORIDE 1 MG: 1 INJECTION, SOLUTION INTRAVENOUS at 11:24

## 2020-08-29 ASSESSMENT — MIFFLIN-ST. JEOR: SCORE: 1936.32

## 2020-08-29 NOTE — PROGRESS NOTES
Stop time on MAR & chart I & O  Chemo drug: fludarabine  Tolerated: no adverse events  Intervention: blood return and premeds  Response: n/a  Plan: continue to monitor  Lab:   Na - 140  K - 3.6  UpH  n/a  drug level n/a  Wt/intervention n/a  Shower/drsg/linen n/a    Cytoxan Primer  Cytoxan infused at  [place] infusing at __ and ends__  Flush infusing at __ and ends __

## 2020-08-29 NOTE — PROGRESS NOTES
"Juvenal Blake is a 54 year old male with relapsed follicular lymphoma undergoing NAM NK treatment     Interim hx:  He is doing ok today. He comes to  for LD chemo.  He is having some nausea without emesis.  No diarrhea.  Constipation, no stool for several days.  \"has something at home for constipation\". No fevers.  No new cough and sob with exertion, stable..   Made appointment for Tuesday, 9/1 to be seen in clinic for labs and provider appointment as not made last week, before PICC placement.  He knows to present to [facility] second floor on Tuesday.  Has contact numbers if he needs to call with issues including fevers..     ROS:  A 10 point review of systems was negative other than noted above.      On exam:  Blood pressure (!) 143/81, pulse 71, temperature 97.9  F (36.6  C), temperature source Axillary, resp. rate 16, height 1.715 m (5' 7.52\"), weight 113.9 kg (251 lb 3.2 oz), SpO2 99 %.         HEENT: PERRL, EOMI, MMM  Chest: CTAB  Cvs: S1 S2 RRR, no murmurs or gallops  LAD:  Appreciable LN/fullness R groin (not examined today).  B/L axillary fullness.  No discrete nodes appreciated.  CNS: non focal  Right: Port a cath        A/P:     54 years old male with relapsed follicular lymphoma here for rituxan pre NAM NK therapy.   [id number] : A Phase I Trial Testing [facility] Expanded Haploidentical or Mismatched Related Donor Natural Killer (NK) Cells Followed by a Short Course of IL-2 for the Treatment of Relapsed/Refractory CD20+ Non-Hodgkin Lymphoma       1. Cellular Therapy:Day -10 rituxan (given on day -11: 8/21)   8/27: cy/flu   8/28: cy/flu   8/29 (on ): cy/flu rituxan   9/1 and 9/3: Admit for NAM-NK cells and IL-2   9/5: IL-2, discharge   9/11: Day +11 rituxan (given on day +10)   Continue allopurinol    2. Heme: no transfusions yesterday, labs are pending but unlikely will need transfusions today        3.ID: afebrile here today  not yet neutropenic so will continue ACV, bactrim (pcp prophy) and allopurinol.  Start " fluconazole and levaquin when neutropenic or maybe Tuesday when comes back to 5C.  8/25: COVID negative  Unclear if needs COVID today for Tuesday to come to the PHASE 1 unit for NK cells.      4. GI:better with prn ativan/compazine that he picked up yesterday  Dispo:     Made appointment today, Saturday, 8/29 for 9/1/2020: 07:15 am for labs and see provider at 08:00 am as the only appointment in [facility] on Tuesday, 9/1 is for PICC placement .  Gave Juvenal contact numbers to call if has issues between today and Tuesday.  He verbalizes understanding of plan.    Admission to Early Phase Research Unit    August 29, 2020    Clinical Trial:     Principle Investigator:     Research Nurse:     Common Side Effects & Recommended Intervention(s):      Juvenal Blake was admitted to the early phase research unit at the [facility] for administration of LD chemotherapy and clinical monitoring. The patient has been assessed by a provider prior to admission and medically cleared for therapy. In the case of an adverse event or change in clinical status requiring additional medical management, the PI will be contacted and the decision to admit the patient to the 5C unit will be determined by the BMT team B advanced practice provider and collaborating physician. The attending physician on service is Dr. Rosa Terry [phone]     PI: Rosa Terry [phone]  Research nurse: Estrella Solomon [phone]   Assigned to dose cohort #3: 2 x 10(8)/kg [facility]                      Glory Covarrubias PA-C  [phone]

## 2020-08-29 NOTE — INTERIM SUMMARY
BMT Summary of Care    August 29, 2020 9:00 AM  Juvenal Blake  MRN: [id number]    Discharge Date: 8/29/2020      BMT Primary Physician: Dr Corral    BMT Nurse Coordinator: Hoang Stoner    Discharge Diagnosis: Post LD chemotherapy    Discharge To: BMT CLinic    Activity: As tolerated    Catheter Care: Port-a-cath - Flush each line with 5mL of 100 unit/mL heparin every 24 hours          Nutrition: Regular diet as tolerated    Blood Transfusions:  Transfuse if Hemoglobin < or equal 8 mg/dL  Red Blood Cell Order: 2 units, irradiated and leukoreduced   Transfuse if Platelet count < or equal 10 uL  Platelet order: 1 adult dose, irradiated and leukoreduced      Intravenous Electrolyte Replacement:  Potassium  chloride (give only if serum creatinine < 2)     3-3.3  20mEq/hr  over 1 hour x 2 doses     <3      20mEq/hr  over 1 hour x 3 doses  Magnesium sulfate 1.3-1.7     2 grams over 1 hour x1 dose                                     <1.3         2 grams over 2 hours x1 dose    Outpatient Pharmacy:            Appointments:   For appointments please call [phone] if needed    9/1/2020:  LABS at 07:15 in the [facility] and surgery center 2nd floor  See provider at 08:00 am in the [facility]  09:30 am  For PICC line  Then go to     Glory Covarrubias PA-C      BMT Contact Information:-   For issues including fevers 100.5 or more:  Please call during the week: Monday through Friday between hours of 8:00 am and 4:30 pm- Call BMT office- [phone]  After hours/Weekends: Please call [facility] : [phone] and ask for BMT physician on call and the  will have the BMT physician call you back.

## 2020-08-29 NOTE — PLAN OF CARE
"/84 (BP Location: Right arm)   Pulse 67   Temp 97.4  F (36.3  C) (Oral)   Resp 16   Ht 1.715 m (5' 7.52\")   Wt 113.9 kg (251 lb 3.2 oz)   SpO2 100%   BMI 38.74 kg/m        S: no acute events from 0900 - 1900    B: 54 year old male with relapsed follicular lymphoma undergoing [facility] NK treatment,   [id number] : A Phase I Trial Testing [facility] Expanded Haploidentical or Mismatched Related Donor Natural Killer (NK) Cells Followed by a Short Course of IL-2 for the Treatment of Relapsed/Refractory Multiple Myeloma and Relapsed/Refractory CD20+ Non-Hodgkin Lymphoma    A: Afebrile, VSS, sats 100 % on RA. Patient denies nausea, vomiting - endorses baseline shoulder pain. Voiding adequately, endorses constipation. Patient received fludarabine and tolerated well, patient received cytoxan and tolerated well, patient received two flushes 700 ml each. Patient currently receiving rituxan at 300 ml/hr. This dose was titrated 50 ml every 30 minutes. Patient received all ordered premedications. Patient was noted to have pitting edema on LE upon assessment. Patient received COVID test in anticipation of admission on Tuesday. Patient ate well and watched movies he brought from home. Port will need to be flushed and deaccessed before departure this evening.      R: no further nursing concerns, continue plan of care.  "

## 2020-08-30 LAB
SARS-COV-2 RNA SPEC QL NAA+PROBE: NOT DETECTED
SPECIMEN SOURCE: NORMAL

## 2020-08-30 NOTE — PROGRESS NOTES
Stop time on MAR & chart I & O  Chemo drug: Rituximab  Tolerated: No adverse effect  Intervention: N/A  Response: N/A  Plan: discharge pt home post chemotherapy  Lab: Na, K, UpH drug level  Wt/intervention  Shower/drsg/linen    Cytoxan Primer  Cytoxan infused at  [place] infusing at __ and ends__  Flush infusing at __ and ends __

## 2020-08-30 NOTE — PLAN OF CARE
".Blood pressure (!) 157/89, pulse 67, temperature 97.5  F (36.4  C), temperature source Axillary, resp. rate 16, height 1.715 m (5' 7.52\"), weight 113.9 kg (251 lb 3.2 oz), SpO2 98 %.    Pt had chemotherapy, Rituximab finished at 20:15 and he tolerated  infusion well. Port-a-cath was de-accessed after chemo infusion and pt was discharged home. All personal belongings with pt. Pt ambulated off unit in a stable condition. All discharge papers given to pt and he will be seen in clinic ob Tuesday, 09/01/20 for lab draw and provider appointment then will have PICC line placed. No family present on unit .   "

## 2020-08-30 NOTE — PROGRESS NOTES
Stop time on MAR & chart I & O  Chemo drug: cytoxan  Tolerated: no adverse events  Intervention: blood return noted  Response: n/a  Plan: continue to monitor  Lab:   Na, 142  K, 3.6  UpH - n/a  drug level - n/a  Wt/intervention n/a  Shower/drsg/linen - n/a    Cytoxan Primer  Cytoxan infused at 1302, patient received before and after flush totaling 1400 ml    [place] infusing at __ and ends__  Flush infusing at __ and ends __

## 2020-09-01 ENCOUNTER — RECORDS - HEALTHEAST (OUTPATIENT)
Dept: ADMINISTRATIVE | Facility: OTHER | Age: 55
End: 2020-09-01

## 2020-09-01 ENCOUNTER — APPOINTMENT (OUTPATIENT)
Dept: LAB | Facility: CLINIC | Age: 55
DRG: 840 | End: 2020-09-01
Payer: COMMERCIAL

## 2020-09-01 ENCOUNTER — ONCOLOGY VISIT (OUTPATIENT)
Dept: TRANSPLANT | Facility: CLINIC | Age: 55
DRG: 840 | End: 2020-09-01
Attending: PHYSICIAN ASSISTANT
Payer: COMMERCIAL

## 2020-09-01 ENCOUNTER — HOSPITAL ENCOUNTER (OUTPATIENT)
Dept: VASCULAR ULTRASOUND | Facility: CLINIC | Age: 55
DRG: 840 | End: 2020-09-01
Attending: INTERNAL MEDICINE
Payer: COMMERCIAL

## 2020-09-01 ENCOUNTER — HOSPITAL ENCOUNTER (INPATIENT)
Facility: CLINIC | Age: 55
LOS: 4 days | Discharge: HOME | DRG: 840 | End: 2020-09-05
Attending: INTERNAL MEDICINE
Payer: COMMERCIAL

## 2020-09-01 ENCOUNTER — HOSPITAL ENCOUNTER (OUTPATIENT)
Dept: GENERAL RADIOLOGY | Facility: CLINIC | Age: 55
DRG: 840 | End: 2020-09-01
Attending: INTERNAL MEDICINE
Payer: COMMERCIAL

## 2020-09-01 VITALS
DIASTOLIC BLOOD PRESSURE: 80 MMHG | SYSTOLIC BLOOD PRESSURE: 144 MMHG | RESPIRATION RATE: 16 BRPM | OXYGEN SATURATION: 100 % | TEMPERATURE: 97.6 F | BODY MASS INDEX: 38.29 KG/M2 | HEART RATE: 77 BPM | WEIGHT: 248.3 LBS

## 2020-09-01 DIAGNOSIS — C82.08 FOLLICULAR LYMPHOMA GRADE I, LYMPH NODES OF MULTIPLE SITES (H): Primary | ICD-10-CM

## 2020-09-01 DIAGNOSIS — C82.08 FOLLICULAR LYMPHOMA GRADE I, LYMPH NODES OF MULTIPLE SITES (H): ICD-10-CM

## 2020-09-01 LAB
ABO + RH BLD: NORMAL
ABO + RH BLD: NORMAL
ALBUMIN SERPL-MCNC: 3.6 G/DL (ref 3.4–5)
ALP SERPL-CCNC: 67 U/L (ref 40–150)
ALT SERPL W P-5'-P-CCNC: 34 U/L (ref 0–70)
ANION GAP SERPL CALCULATED.3IONS-SCNC: 5 MMOL/L (ref 3–14)
APTT PPP: 30 SEC (ref 22–37)
AST SERPL W P-5'-P-CCNC: 14 U/L (ref 0–45)
BASOPHILS # BLD AUTO: 0 10E9/L (ref 0–0.2)
BASOPHILS NFR BLD AUTO: 0.2 %
BILIRUB SERPL-MCNC: 0.8 MG/DL (ref 0.2–1.3)
BLD GP AB SCN SERPL QL: NORMAL
BLOOD BANK CMNT PATIENT-IMP: NORMAL
BUN SERPL-MCNC: 14 MG/DL (ref 7–30)
CALCIUM SERPL-MCNC: 8.8 MG/DL (ref 8.5–10.1)
CHLORIDE SERPL-SCNC: 108 MMOL/L (ref 94–109)
CO2 SERPL-SCNC: 26 MMOL/L (ref 20–32)
CREAT SERPL-MCNC: 1.13 MG/DL (ref 0.66–1.25)
CRP SERPL-MCNC: 25.9 MG/L (ref 0–8)
DIFFERENTIAL METHOD BLD: ABNORMAL
EOSINOPHIL # BLD AUTO: 0.1 10E9/L (ref 0–0.7)
EOSINOPHIL NFR BLD AUTO: 2.7 %
ERYTHROCYTE [DISTWIDTH] IN BLOOD BY AUTOMATED COUNT: 13.3 % (ref 10–15)
FERRITIN SERPL-MCNC: 116 NG/ML (ref 26–388)
GFR SERPL CREATININE-BSD FRML MDRD: 72 ML/MIN/{1.73_M2}
GLUCOSE SERPL-MCNC: 123 MG/DL (ref 70–99)
HCT VFR BLD AUTO: 35.1 % (ref 40–53)
HGB BLD-MCNC: 11.9 G/DL (ref 13.3–17.7)
IMM GRANULOCYTES # BLD: 0 10E9/L (ref 0–0.4)
IMM GRANULOCYTES NFR BLD: 0.4 %
INR PPP: 1.04 (ref 0.86–1.14)
LYMPHOCYTES # BLD AUTO: 0 10E9/L (ref 0.8–5.3)
LYMPHOCYTES NFR BLD AUTO: 0.8 %
MAGNESIUM SERPL-MCNC: 2.1 MG/DL (ref 1.6–2.3)
MCH RBC QN AUTO: 30.4 PG (ref 26.5–33)
MCHC RBC AUTO-ENTMCNC: 33.9 G/DL (ref 31.5–36.5)
MCV RBC AUTO: 90 FL (ref 78–100)
MONOCYTES # BLD AUTO: 0.1 10E9/L (ref 0–1.3)
MONOCYTES NFR BLD AUTO: 3 %
NEUTROPHILS # BLD AUTO: 4.4 10E9/L (ref 1.6–8.3)
NEUTROPHILS NFR BLD AUTO: 92.9 %
NRBC # BLD AUTO: 0 10*3/UL
NRBC BLD AUTO-RTO: 0 /100
PLATELET # BLD AUTO: 137 10E9/L (ref 150–450)
POTASSIUM SERPL-SCNC: 3.9 MMOL/L (ref 3.4–5.3)
PROT SERPL-MCNC: 6.2 G/DL (ref 6.8–8.8)
RBC # BLD AUTO: 3.92 10E12/L (ref 4.4–5.9)
SODIUM SERPL-SCNC: 139 MMOL/L (ref 133–144)
SPECIMEN EXP DATE BLD: NORMAL
URATE SERPL-MCNC: 2.3 MG/DL (ref 3.5–7.2)
WBC # BLD AUTO: 4.7 10E9/L (ref 4–11)

## 2020-09-01 PROCEDURE — 3E04302 INTRODUCTION OF HIGH-DOSE INTERLEUKIN-2 INTO CENTRAL VEIN, PERCUTANEOUS APPROACH: ICD-10-PCS

## 2020-09-01 PROCEDURE — 85730 THROMBOPLASTIN TIME PARTIAL: CPT

## 2020-09-01 PROCEDURE — 25800029 ZZH RX IP 258 OP 250

## 2020-09-01 PROCEDURE — 85610 PROTHROMBIN TIME: CPT

## 2020-09-01 PROCEDURE — 25000128 H RX IP 250 OP 636: Mod: ZF | Performed by: PHYSICIAN ASSISTANT

## 2020-09-01 PROCEDURE — 85025 COMPLETE CBC W/AUTO DIFF WBC: CPT | Performed by: PHYSICIAN ASSISTANT

## 2020-09-01 PROCEDURE — 25000132 ZZH RX MED GY IP 250 OP 250 PS 637

## 2020-09-01 PROCEDURE — 83735 ASSAY OF MAGNESIUM: CPT

## 2020-09-01 PROCEDURE — 25000132 ZZH RX MED GY IP 250 OP 250 PS 637: Performed by: PHYSICIAN ASSISTANT

## 2020-09-01 PROCEDURE — 36569 INSJ PICC 5 YR+ W/O IMAGING: CPT

## 2020-09-01 PROCEDURE — 3E04305 INTRODUCTION OF OTHER ANTINEOPLASTIC INTO CENTRAL VEIN, PERCUTANEOUS APPROACH: ICD-10-PCS

## 2020-09-01 PROCEDURE — 40000986 XR CHEST 1 VW

## 2020-09-01 PROCEDURE — 84550 ASSAY OF BLOOD/URIC ACID: CPT

## 2020-09-01 PROCEDURE — 80053 COMPREHEN METABOLIC PANEL: CPT | Performed by: PHYSICIAN ASSISTANT

## 2020-09-01 PROCEDURE — 86900 BLOOD TYPING SEROLOGIC ABO: CPT | Performed by: PHYSICIAN ASSISTANT

## 2020-09-01 PROCEDURE — 82728 ASSAY OF FERRITIN: CPT

## 2020-09-01 PROCEDURE — 86850 RBC ANTIBODY SCREEN: CPT | Performed by: PHYSICIAN ASSISTANT

## 2020-09-01 PROCEDURE — 86901 BLOOD TYPING SEROLOGIC RH(D): CPT | Performed by: PHYSICIAN ASSISTANT

## 2020-09-01 PROCEDURE — 20600000 ZZH R&B BMT

## 2020-09-01 PROCEDURE — 27211389 ZZ H KIT, 5 FR DL BIOFLO OPEN ENDED PICC

## 2020-09-01 PROCEDURE — 86140 C-REACTIVE PROTEIN: CPT | Performed by: PHYSICIAN ASSISTANT

## 2020-09-01 RX ORDER — ACETAMINOPHEN 325 MG/1
325-650 TABLET ORAL EVERY 4 HOURS PRN
Status: DISCONTINUED | OUTPATIENT
Start: 2020-09-01 | End: 2020-09-05 | Stop reason: HOSPADM

## 2020-09-01 RX ORDER — HEPARIN SODIUM,PORCINE 10 UNIT/ML
5-10 VIAL (ML) INTRAVENOUS
Status: DISCONTINUED | OUTPATIENT
Start: 2020-09-01 | End: 2020-09-05 | Stop reason: HOSPADM

## 2020-09-01 RX ORDER — ACETAMINOPHEN 325 MG/1
650 TABLET ORAL EVERY 4 HOURS
Status: COMPLETED | OUTPATIENT
Start: 2020-09-01 | End: 2020-09-01

## 2020-09-01 RX ORDER — HEPARIN SODIUM,PORCINE 10 UNIT/ML
5-10 VIAL (ML) INTRAVENOUS EVERY 24 HOURS
Status: DISCONTINUED | OUTPATIENT
Start: 2020-09-01 | End: 2020-09-05 | Stop reason: HOSPADM

## 2020-09-01 RX ORDER — MEPERIDINE HYDROCHLORIDE 25 MG/ML
25-50 INJECTION INTRAMUSCULAR; INTRAVENOUS; SUBCUTANEOUS
Status: DISCONTINUED | OUTPATIENT
Start: 2020-09-05 | End: 2020-09-05 | Stop reason: HOSPADM

## 2020-09-01 RX ORDER — POTASSIUM CHLORIDE 29.8 MG/ML
20 INJECTION INTRAVENOUS
Status: DISCONTINUED | OUTPATIENT
Start: 2020-09-01 | End: 2020-09-05 | Stop reason: HOSPADM

## 2020-09-01 RX ORDER — SULFAMETHOXAZOLE/TRIMETHOPRIM 800-160 MG
1 TABLET ORAL
Status: DISCONTINUED | OUTPATIENT
Start: 2020-09-01 | End: 2020-09-05 | Stop reason: HOSPADM

## 2020-09-01 RX ORDER — LORAZEPAM 2 MG/ML
.5-1 INJECTION INTRAMUSCULAR EVERY 4 HOURS PRN
Status: DISCONTINUED | OUTPATIENT
Start: 2020-09-01 | End: 2020-09-05 | Stop reason: HOSPADM

## 2020-09-01 RX ORDER — PANTOPRAZOLE SODIUM 40 MG/1
40 TABLET, DELAYED RELEASE ORAL DAILY
Status: DISCONTINUED | OUTPATIENT
Start: 2020-09-01 | End: 2020-09-05 | Stop reason: HOSPADM

## 2020-09-01 RX ORDER — GRANISETRON HYDROCHLORIDE 1 MG/ML
1 INJECTION INTRAVENOUS
Status: DISCONTINUED | OUTPATIENT
Start: 2020-09-01 | End: 2020-09-01

## 2020-09-01 RX ORDER — LORAZEPAM 0.5 MG/1
.5-1 TABLET ORAL EVERY 4 HOURS PRN
Status: DISCONTINUED | OUTPATIENT
Start: 2020-09-01 | End: 2020-09-05 | Stop reason: HOSPADM

## 2020-09-01 RX ORDER — DEXAMETHASONE SODIUM PHOSPHATE 4 MG/ML
4 INJECTION, SOLUTION INTRA-ARTICULAR; INTRALESIONAL; INTRAMUSCULAR; INTRAVENOUS; SOFT TISSUE ONCE
Status: DISCONTINUED | OUTPATIENT
Start: 2020-09-01 | End: 2020-09-01

## 2020-09-01 RX ORDER — DIPHENHYDRAMINE HCL 25 MG
25 CAPSULE ORAL EVERY 4 HOURS
Status: COMPLETED | OUTPATIENT
Start: 2020-09-03 | End: 2020-09-03

## 2020-09-01 RX ORDER — MEPERIDINE HYDROCHLORIDE 25 MG/ML
25-50 INJECTION INTRAMUSCULAR; INTRAVENOUS; SUBCUTANEOUS
Status: ACTIVE | OUTPATIENT
Start: 2020-09-01 | End: 2020-09-02

## 2020-09-01 RX ORDER — FLUCONAZOLE 100 MG/1
100 TABLET ORAL DAILY
Status: DISCONTINUED | OUTPATIENT
Start: 2020-09-01 | End: 2020-09-05 | Stop reason: HOSPADM

## 2020-09-01 RX ORDER — HEPARIN SODIUM (PORCINE) LOCK FLUSH IV SOLN 100 UNIT/ML 100 UNIT/ML
5 SOLUTION INTRAVENOUS EVERY 8 HOURS
Status: DISCONTINUED | OUTPATIENT
Start: 2020-09-01 | End: 2020-09-01 | Stop reason: HOSPADM

## 2020-09-01 RX ORDER — HYDROXYZINE HYDROCHLORIDE 50 MG/1
50 TABLET, FILM COATED ORAL 3 TIMES DAILY PRN
Status: DISCONTINUED | OUTPATIENT
Start: 2020-09-01 | End: 2020-09-05 | Stop reason: HOSPADM

## 2020-09-01 RX ORDER — SODIUM CHLORIDE 450 MG/100ML
150 INJECTION, SOLUTION INTRAVENOUS CONTINUOUS
Status: DISCONTINUED | OUTPATIENT
Start: 2020-09-03 | End: 2020-09-03

## 2020-09-01 RX ORDER — POTASSIUM CHLORIDE 1.5 G/1.58G
20-40 POWDER, FOR SOLUTION ORAL
Status: DISCONTINUED | OUTPATIENT
Start: 2020-09-01 | End: 2020-09-05 | Stop reason: HOSPADM

## 2020-09-01 RX ORDER — LIDOCAINE 40 MG/G
CREAM TOPICAL
Status: DISCONTINUED | OUTPATIENT
Start: 2020-09-01 | End: 2020-09-05 | Stop reason: HOSPADM

## 2020-09-01 RX ORDER — ACETAMINOPHEN 325 MG/1
650 TABLET ORAL
Status: DISCONTINUED | OUTPATIENT
Start: 2020-09-01 | End: 2020-09-05 | Stop reason: HOSPADM

## 2020-09-01 RX ORDER — POTASSIUM CHLORIDE 750 MG/1
20-40 TABLET, EXTENDED RELEASE ORAL
Status: DISCONTINUED | OUTPATIENT
Start: 2020-09-01 | End: 2020-09-05 | Stop reason: HOSPADM

## 2020-09-01 RX ORDER — ALLOPURINOL 300 MG/1
300 TABLET ORAL DAILY
Status: DISCONTINUED | OUTPATIENT
Start: 2020-09-01 | End: 2020-09-01

## 2020-09-01 RX ORDER — MAGNESIUM SULFATE HEPTAHYDRATE 40 MG/ML
4 INJECTION, SOLUTION INTRAVENOUS EVERY 4 HOURS PRN
Status: DISCONTINUED | OUTPATIENT
Start: 2020-09-01 | End: 2020-09-05 | Stop reason: HOSPADM

## 2020-09-01 RX ORDER — POTASSIUM CHLORIDE 7.45 MG/ML
10 INJECTION INTRAVENOUS
Status: DISCONTINUED | OUTPATIENT
Start: 2020-09-01 | End: 2020-09-05 | Stop reason: HOSPADM

## 2020-09-01 RX ORDER — ACYCLOVIR 400 MG/1
400 TABLET ORAL EVERY 12 HOURS
Status: DISCONTINUED | OUTPATIENT
Start: 2020-09-01 | End: 2020-09-05 | Stop reason: HOSPADM

## 2020-09-01 RX ORDER — TOLTERODINE 4 MG/1
4 CAPSULE, EXTENDED RELEASE ORAL DAILY
Status: DISCONTINUED | OUTPATIENT
Start: 2020-09-01 | End: 2020-09-05 | Stop reason: HOSPADM

## 2020-09-01 RX ORDER — DIPHENHYDRAMINE HCL 25 MG
25 CAPSULE ORAL
Status: COMPLETED | OUTPATIENT
Start: 2020-09-02 | End: 2020-09-05

## 2020-09-01 RX ORDER — GRANISETRON HYDROCHLORIDE 1 MG/ML
1 INJECTION INTRAVENOUS ONCE
Status: DISCONTINUED | OUTPATIENT
Start: 2020-09-01 | End: 2020-09-01

## 2020-09-01 RX ORDER — ALBUTEROL SULFATE 90 UG/1
2 AEROSOL, METERED RESPIRATORY (INHALATION) EVERY 6 HOURS PRN
Status: DISCONTINUED | OUTPATIENT
Start: 2020-09-01 | End: 2020-09-05 | Stop reason: HOSPADM

## 2020-09-01 RX ORDER — DIPHENHYDRAMINE HCL 25 MG
25 CAPSULE ORAL EVERY 4 HOURS
Status: COMPLETED | OUTPATIENT
Start: 2020-09-01 | End: 2020-09-01

## 2020-09-01 RX ORDER — ACYCLOVIR 400 MG/1
400 TABLET ORAL 2 TIMES DAILY
Status: DISCONTINUED | OUTPATIENT
Start: 2020-09-01 | End: 2020-09-01

## 2020-09-01 RX ORDER — DIPHENHYDRAMINE HCL 25 MG
25 CAPSULE ORAL
Status: DISCONTINUED | OUTPATIENT
Start: 2020-09-01 | End: 2020-09-05 | Stop reason: HOSPADM

## 2020-09-01 RX ORDER — ALLOPURINOL 300 MG/1
300 TABLET ORAL DAILY
Status: DISCONTINUED | OUTPATIENT
Start: 2020-09-01 | End: 2020-09-05 | Stop reason: HOSPADM

## 2020-09-01 RX ORDER — LEVOFLOXACIN 250 MG/1
250 TABLET, FILM COATED ORAL
Status: DISCONTINUED | OUTPATIENT
Start: 2020-09-01 | End: 2020-09-05 | Stop reason: HOSPADM

## 2020-09-01 RX ORDER — ACETAMINOPHEN 325 MG/1
650 TABLET ORAL EVERY 4 HOURS
Status: COMPLETED | OUTPATIENT
Start: 2020-09-03 | End: 2020-09-03

## 2020-09-01 RX ORDER — ACETAMINOPHEN 325 MG/1
650 TABLET ORAL
Status: COMPLETED | OUTPATIENT
Start: 2020-09-02 | End: 2020-09-05

## 2020-09-01 RX ORDER — DEXAMETHASONE SODIUM PHOSPHATE 4 MG/ML
4 INJECTION, SOLUTION INTRA-ARTICULAR; INTRALESIONAL; INTRAMUSCULAR; INTRAVENOUS; SOFT TISSUE
Status: DISCONTINUED | OUTPATIENT
Start: 2020-09-01 | End: 2020-09-01

## 2020-09-01 RX ORDER — SODIUM CHLORIDE 450 MG/100ML
150 INJECTION, SOLUTION INTRAVENOUS CONTINUOUS
Status: DISPENSED | OUTPATIENT
Start: 2020-09-01 | End: 2020-09-01

## 2020-09-01 RX ORDER — VIT E ACET/GLY/DIMETH/WATER
LOTION (ML) TOPICAL DAILY
Status: DISCONTINUED | OUTPATIENT
Start: 2020-09-01 | End: 2020-09-02 | Stop reason: CLARIF

## 2020-09-01 RX ORDER — PROCHLORPERAZINE MALEATE 5 MG
5-10 TABLET ORAL EVERY 6 HOURS PRN
Status: DISCONTINUED | OUTPATIENT
Start: 2020-09-01 | End: 2020-09-05 | Stop reason: HOSPADM

## 2020-09-01 RX ORDER — MEPERIDINE HYDROCHLORIDE 25 MG/ML
25-50 INJECTION INTRAMUSCULAR; INTRAVENOUS; SUBCUTANEOUS
Status: DISCONTINUED | OUTPATIENT
Start: 2020-09-03 | End: 2020-09-04

## 2020-09-01 RX ORDER — POTASSIUM CL/LIDO/0.9 % NACL 10MEQ/0.1L
10 INTRAVENOUS SOLUTION, PIGGYBACK (ML) INTRAVENOUS
Status: DISCONTINUED | OUTPATIENT
Start: 2020-09-01 | End: 2020-09-05 | Stop reason: HOSPADM

## 2020-09-01 RX ADMIN — FLUCONAZOLE 100 MG: 100 TABLET ORAL at 12:58

## 2020-09-01 RX ADMIN — PROCHLORPERAZINE MALEATE 5 MG: 5 TABLET ORAL at 22:30

## 2020-09-01 RX ADMIN — ACETAMINOPHEN 650 MG: 325 TABLET, FILM COATED ORAL at 15:29

## 2020-09-01 RX ADMIN — DIPHENHYDRAMINE HYDROCHLORIDE 25 MG: 25 CAPSULE ORAL at 15:29

## 2020-09-01 RX ADMIN — TOLTERODINE 4 MG: 4 CAPSULE, EXTENDED RELEASE ORAL at 12:58

## 2020-09-01 RX ADMIN — Medication 5 ML: at 07:26

## 2020-09-01 RX ADMIN — SODIUM CHLORIDE 150 ML/HR: 4.5 INJECTION, SOLUTION INTRAVENOUS at 13:52

## 2020-09-01 RX ADMIN — DIPHENHYDRAMINE HYDROCHLORIDE 25 MG: 25 CAPSULE ORAL at 20:29

## 2020-09-01 RX ADMIN — PANTOPRAZOLE SODIUM 40 MG: 40 TABLET, DELAYED RELEASE ORAL at 12:59

## 2020-09-01 RX ADMIN — ACETAMINOPHEN 650 MG: 325 TABLET, FILM COATED ORAL at 20:29

## 2020-09-01 RX ADMIN — LEVOFLOXACIN 250 MG: 250 TABLET, FILM COATED ORAL at 12:58

## 2020-09-01 RX ADMIN — SULFAMETHOXAZOLE AND TRIMETHOPRIM 1 TABLET: 800; 160 TABLET ORAL at 21:25

## 2020-09-01 RX ADMIN — PROCHLORPERAZINE MALEATE 5 MG: 5 TABLET ORAL at 12:58

## 2020-09-01 ASSESSMENT — ACTIVITIES OF DAILY LIVING (ADL)
ADLS_ACUITY_SCORE: 10

## 2020-09-01 ASSESSMENT — PAIN SCALES - GENERAL: PAINLEVEL: MILD PAIN (2)

## 2020-09-01 NOTE — PROCEDURES
BMT/Cellular Allogeneic Product Infusion       Patient Vitals for the past 24 hrs:   Temp Temp src Pulse Resp BP   09/01/20 1050 97.4  F (36.3  C) Oral 68 16 116/82   09/01/20 1522 98.3  F (36.8  C) Oral 69 16 123/76   09/01/20 1555 98.3  F (36.8  C) Oral 65 16 124/87   09/01/20 1619 97.6  F (36.4  C) Oral 67 16 136/81   09/01/20 1941 98.5  F (36.9  C) Oral 58 16 129/77   09/01/20 2112 98.4  F (36.9  C) Oral 78 16 124/84   09/02/20 0037 98.3  F (36.8  C) Oral 66 16 115/73   09/02/20 0347 98.1  F (36.7  C) Oral 75 16 111/79                       BMT/Cellular Product Infusion                   Row Name 09/01/20 1400                                    [REMOVED] Product 09/01/20 1305 NK Cells, Apheresis       Product Details Product Release Date: 09/01/20  -KZ Product Release Time: 1305  -KZ Product Type: NK Cells, Apheresis  -KZ DIN: [id number]  -KZ Product Description Code: [id number]  -KZ Volume Dispensed (mL): 136 mL  -KZ Completion Date (RN to complete): 09/01/20  -LK Completion Time (RN to complete): 1625  -LK       Checked by (Patient RN)  Maria Luz Duenas RN  -LK             Checked by (Witness)  Sushma Soto RN  -NL             Product Volume Infused (mL)  136 mL  -LK             Flush Volume (mL)  30 mL  -LK             Volume Dispensed (mL)                   Baseline Pre-Infusion Evaluation (to be completed by Provider):   Dyspnea: Grade 0 - none  Hypoxia: Grade 0 - not present  Fever: Grade 0 - afebrile  Chills: Grade 0 - none  Febrile Neutropenia: Grade 0 - not present  Sinus Bradycardia: Grade 0 - none  Hypertension: Grade 0 - none  Hypotension: Grade 0 - none  Chest Pain: Grade 0 - none  Bronchospasm: Grade 0 - none  Pain: Grade 0 - none  Rash: Grade 0 - None  Neurologic Specify: none       If adverse reactions, events or complications occur (fever greater than 2 degrees fahrenheit increase, and severe reactions of the following types: chills, dyspnea, bronchospasm, hyper/hypotension, hypoxia,  bradycardia, chest pain, back/flank pain, hypoxia, and any other reaction deemed severe or life threatening; any instance of product bag breakage or unusual product appearance)    Any other events that are >= grade 3, then immediately contact the BMT Attending physician, the Cell Therapy Laboratory Medical Director (pager [phone]) and the Cell Therapy Laboratory ([phone]).  After midnight, holidays & weekends contact the [facility] Blood Bank on the appropriate campus ([facility] Bank: [phone]; [facility] Bank: [phone]).    Irma Bethea PA-C

## 2020-09-01 NOTE — PROGRESS NOTES
Juvenal Blake is a 54 year old male with relapsed follicular lymphoma undergoing NAM NK treatment    Interim hx: Presents today prior to PICC placement and admission to  for haplo NAM NK therapy.  He is feeling well.  He only has one palpable lymph node.  It is in his right groin and it has gotten smaller.  No cough, cold, fever, chest pain, abdominal pain.  He has chronic itching.  Benadryl keeps it tolerable.  He also takes claritin.  Zofran makes it worse. No rash to his knowledge.  He has nausea without vomiting. Compazine helps.    ROS:  A 10 point review of systems was negative other than noted above.     On exam:  Blood pressure (!) 144/80, pulse 77, temperature 97.6  F (36.4  C), temperature source Oral, resp. rate 16, weight 112.6 kg (248 lb 4.8 oz), SpO2 100 %.    Wt Readings from Last 4 Encounters:   09/01/20 112.6 kg (248 lb 4.8 oz)   08/29/20 113.9 kg (251 lb 3.2 oz)   08/28/20 114.2 kg (251 lb 12.3 oz)   08/27/20 113.9 kg (251 lb 3.2 oz)     HEENT: PERRL, EOMI, MMM  Chest: CTAB  Cvs: S1 S2 RRR, no murmurs or gallops  Abd: soft, nontender  LAD:  Appreciable LN/fullness R groin.  No cervical, supraclavicular nor axillary LAD.   CNS: non focal      A/P:    54 years old male with relapsed follicular lymphoma here for rituxan pre NAM NK therapy.     Day -10 rituxan (given on day -11: 8/21)   8/27: cy/flu   8/28: cy/flu   8/29 (on 5C): cy/flu rituxan   9/1 and 9/3: Admit for NAM-NK cells and IL-2   9/5: IL-2, discharge   9/11: Day +11 rituxan (given on day +10)       Per protocol continue ACV, bactrim (pcp prophy) and allopurinol.      Proceed to the hospital for PICC placement and admission to .     Penny Garcia, MAL  9/1/2020

## 2020-09-01 NOTE — NURSING NOTE
"Oncology Rooming Note    September 1, 2020 7:43 AM   Juvenal Blake is a 54 year old male who presents for:    Chief Complaint   Patient presents with     Lab Only     labs drawn via port by RN in lab, saline and heparin locked, vitals completed     RECHECK     Follicular Lymphoma      Initial Vitals: BP (!) 144/80   Pulse 77   Temp 97.6  F (36.4  C) (Oral)   Resp 16   Wt 112.6 kg (248 lb 4.8 oz)   SpO2 100%   BMI 38.29 kg/m   Estimated body mass index is 38.29 kg/m  as calculated from the following:    Height as of 8/29/20: 1.715 m (5' 7.52\").    Weight as of this encounter: 112.6 kg (248 lb 4.8 oz). Body surface area is 2.32 meters squared.  Mild Pain (2) Comment: Data Unavailable   No LMP for male patient.  Allergies reviewed: Yes  Medications reviewed: Yes    Medications: Medication refills not needed today.  Pharmacy name entered into [employer]: [id number] - [place], [place] - [address] AT [facility]    Clinical concerns: None       Suri Crabtree CMA              "

## 2020-09-01 NOTE — PROCEDURES
[facility] [place]    Double Lumen PICC Placement    Date/Time: 9/1/2020 10:27 AM  Performed by: Yani Tariq RN  Authorized by: Gage Corral MD   Indications: chemotherapy.    UNIVERSAL PROTOCOL   Site Marked: Yes  Prior Images Obtained and Reviewed:  Yes  Required items: Required blood products, implants, devices and special equipment available    Patient identity confirmed:  Verbally with patient and arm band  NA - No sedation, light sedation, or local anesthesia  Confirmation Checklist:  Patient's identity using two indicators, relevant allergies, procedure was appropriate and matched the consent or emergent situation and correct equipment/implants were available  Time out: Immediately prior to the procedure a time out was called    Universal Protocol: the Joint Commission Universal Protocol was followed    Preparation: Patient was prepped and draped in usual sterile fashion           ANESTHESIA    Local Anesthetic: Lidocaine 1% without epinephrine  Anesthetic Total (mL):  1      SEDATION    Patient Sedated: No        Preparation: skin prepped with ChloraPrep  Skin prep agent: skin prep agent completely dried prior to procedure  Sterile barriers: maximum sterile barriers were used: cap, mask, sterile gown, sterile gloves, and large sterile sheet  Hand hygiene: hand hygiene performed prior to central venous catheter insertion  Type of line used: Power PICC  Catheter type: double lumen  Lumen type: non-valved  Catheter size: 5 Fr  Lot number: [id number]  Placement method: venipuncture, MST, ultrasound and tip confirmation system  Number of attempts: 1  Successful placement: yes  Orientation: left  Location: basilic vein (Vein Diameter 0.50)  Site rationale: right port  Arm circumference: adults 10 cm  Extremity circumference: 32  Visible catheter length: 1  Total catheter length: 45  Dressing and securement: blood removed, occlusive dressing applied, chlorhexidine disc applied,  site cleaned, statlock and dressing applied  Post procedure assessment: blood return through all ports and placement verified by x-ray  PROCEDURE   Patient Tolerance:  Patient tolerated the procedure well with no immediate complications

## 2020-09-01 NOTE — PROGRESS NOTES
Patient admitted to:  room 5410  Admitted from: clinic  Arrived by: personal ride  Reason for admission: Scheduled admit for NAM NK cells  Patient accompanied by: Spouse Nancy  Belongings: Kept with patient, wife to take home medications with her  Teaching: Oriented to room, call light, and patient monitoring  Skin double check completed by: Sushma BROWN RN  Skin findings: scab to R forearm, some blanchable redness to shins and back. Otherwise CDI.

## 2020-09-01 NOTE — LETTER
9/1/2020         RE: Juvenal Blake  [facility] [place] [address]        Dear Colleague,    Thank you for referring your patient, Juvenal Blake, to the [facility] BLOOD AND MARROW TRANSPLANT. Please see a copy of my visit note below.    Juvenal Blake is a 54 year old male with relapsed follicular lymphoma undergoing NAM NK treatment    Interim hx: Presents today prior to PICC placement and admission to  for haplo NAM NK therapy.  He is feeling well.  He only has one palpable lymph node.  It is in his right groin and it has gotten smaller.  No cough, cold, fever, chest pain, abdominal pain.  He has chronic itching.  Benadryl keeps it tolerable.  He also takes claritin.  Zofran makes it worse. No rash to his knowledge.  He has nausea without vomiting. Compazine helps.    ROS:  A 10 point review of systems was negative other than noted above.     On exam:  Blood pressure (!) 144/80, pulse 77, temperature 97.6  F (36.4  C), temperature source Oral, resp. rate 16, weight 112.6 kg (248 lb 4.8 oz), SpO2 100 %.    Wt Readings from Last 4 Encounters:   09/01/20 112.6 kg (248 lb 4.8 oz)   08/29/20 113.9 kg (251 lb 3.2 oz)   08/28/20 114.2 kg (251 lb 12.3 oz)   08/27/20 113.9 kg (251 lb 3.2 oz)     HEENT: PERRL, EOMI, MMM  Chest: CTAB  Cvs: S1 S2 RRR, no murmurs or gallops  Abd: soft, nontender  LAD:  Appreciable LN/fullness R groin.  No cervical, supraclavicular nor axillary LAD.   CNS: non focal      A/P:    54 years old male with relapsed follicular lymphoma here for rituxan pre NAM NK therapy.     Day -10 rituxan (given on day -11: 8/21)   8/27: cy/flu   8/28: cy/flu   8/29 (on ): cy/flu rituxan   9/1 and 9/3: Admit for NAM-NK cells and IL-2   9/5: IL-2, discharge   9/11: Day +11 rituxan (given on day +10)       Per protocol continue ACV, bactrim (pcp prophy) and allopurinol.      Proceed to the hospital for PICC placement and admission to 5C.     Penny Garcia PA-C  9/1/2020

## 2020-09-01 NOTE — PROGRESS NOTES
Type of transplant: Donor: Allogeneic - Related  Product:      BMT INFUSION DOCUMENTATION (last 48 hours)      BMT/Cellular Product Infusion     Row Name 09/01/20 1400                [REMOVED] Product 09/01/20 1305 NK Cells, Apheresis    Product Details Product Release Date: 09/01/20  -KMAURICE Product Release Time: 1305  -KZ Product Type: NK Cells, Apheresis  -KZ DIN: [id number]  -KZ Product Description Code: [id number]  -MOJGAN Volume Dispensed (mL): 136 mL  -KZ Completion Date (RN to complete): 09/01/20  -LK Completion Time (RN to complete): 1625  -LK    Checked by (Patient RN)  Maria Luz Duenas RN  -LENNOX       Checked by (Witness)  Sushma Soto RN  -NL       Product Volume Infused (mL)  136 mL  -LK       Flush Volume (mL)  30 mL  -LK       Volume Dispensed (mL)  --         User Key  (r) = Recorded By, (t) = Taken By, (c) = Cosigned By    Initials Name Effective Dates    Emily Cortez 01/14/19 -     Maria Luz Burks RN 07/18/17 -     Sushma Yan 08/12/19 -         Preparation: RN Documentation  Patient was premedicated as ordered: yes  Line Type: central line, left  Patient Stable Prior to Infusion: yes  Time Infusion Started: 1558  Teaching: side effects/monitoring  Tolerated/Reaction: Patient tolerance of product infusion  Immediate suspected transfusion reaction to the product: none  Did patient have prior history of similar signs/symptoms during this hospitalization?: NA  Symptoms during/after infusion: (NA)  Did the patient tolerate the infusion well: yes  Medications and treatment for symptoms: NA  Did the symptoms resolve?: (NA)  Enter comments if clots, leaks, broken bag, infusion delays, other issues with bag/infusion: NA  Flush until: 0425  Plan: Continue to monitor, following plan of care

## 2020-09-01 NOTE — H&P
BMT History & Physical     Admission  Name: Juvenal Blake  Date:  9/1/2020  Service: BMT   Chief Complaint: follicular lymphoma  Informant:  Patient and Chart  Resuscitation Status: Full Code    Patient ID:  Juvenal Blake is a 54 year old male, currently day 0 of his NAM NK cell therapy    Transplant Essential Data:  Diagnosis NHLFL Non-Hodgkin lymphoma, Follicular, predominantly large cell  HCT Type Allogeneic    Prep Regimen Cytoxan  Fludarabine   Donor Source No data was found    GVHD Prophylaxis No data was found  Clinical Trials None     Oncology Treatment History: Juvenal Blake is a 53yo M with follicular lymphoma. He was originally diagnosed in 2010 with a grade I-II follicular lymphoma. He was stage IV at diagnosis with marrow involvement. He was treated with bendamustine and Rituxan and achieved a CR. He then unfortunately relapsed in 12/2016 with a low-grade follicular lymphoma again.  He was again treated with Rituxan and bendamustine and again had achieved a complete remission.  He had a second relapse then in 01/2019 for which he was treated with O-CHOP and again achieved a CR.  Imaging in 04/2020 showed adenopathy above and below the diaphragm.  He had an axillary node biopsied and this again showed a grade I-II follicular lymphoma which expressed CD19 and CD20.  The Ki-67 was 20%.       PAST MEDICAL HISTORY:  Remarkable for [place] 7 high-risk prostate cancer with invasion of the capsule but negative nodes, for which he was treated with prostatectomy in 2017 and then subsequent radiation.  His most recent PSA was low, and he has had no recurrent symptoms.  Interestingly, he also has a history of hypogammaglobulinemia and has received intermittent doses of IVIG over the last year and a half or so.    Juvenal is being admitted today for NAM-NK cell therapy. He already completed LD chemo outpatient and today is Day 0. He has some nausea which is best controlled with compazine. No fevers or infectious  concerns. Able to maintain appropriate intake. No diarrhea. Skin is itchy, scratching legs. No visible rash. PICC placed today in left arm, not painful.    Treatment/Chemotherapy Number of Cycles Date Range Outcomes & Complications   Bendamustine/Rituxan 6 2010 CR x 5-6 year resmission   Maintenance rituxan  [id number] Relapse in 2017   Bendamustine/Rituxan 6 Feb-Jul 2017 CR   Maintenance rituxan  2017 through April 2019 second relapse in Jan 2019 via repeat biopsy   O-CHOP 6 Through end of 2019 Progression April 2020       Bone Marrow Workup Results:  Blood Counts Recent Labs   Lab Test 09/01/20  0730   WBC 4.7   ANEU 4.4   ALYM 0.0*   THONY 0.1   AEOS 0.1   HGB 11.9*   HCT 35.1*   *      Bone Marrow 7/22/20       - Marrow cellularity 60% (slightly hypercellular for age) with   trilineage hematopoiesis        - Minimal histologic evidence of paratrabecular low grade lymphoma   comprising less than 5% of overall   marrow cellularity        - Peripheral blood showing very slight anemia; lymphocytopenia     By flow:  INTERPRETATION:   Bone Marrow, Left:        CD10-positive kappa monotypic B cells (0.6%)     By FISH:  RESULTS:   NORMAL   - Negative for IGH-BCL2 fusion     Chromosomes:  INTERPRETATION:   No cells with a t(14;18) or other clonal abnormality were found among the   20 metaphase cells analyzed by   G-banding.    Blood Type Recent Labs   Lab Test 07/16/20  1600   ABO O      Chemistries Recent Labs   Lab Test 09/01/20  0730      POTASSIUM 3.9   CHLORIDE 108   CO2 26   BUN 14   CR 1.13      Liver Tests Recent Labs   Lab Test 09/01/20  0730   BILITOTAL 0.8   ALKPHOS 67   AST 14   ALT 34      PET/CT: 7/22/20  IMPRESSION: In this patient with history of follicular lymphoma status  post chemotherapy, currently undergoing workup for bone marrow  transplant:  1. Numerous FDG avid lymphadenopathy in the left lower neck, right  axilla, few nodes in the mediastinum, retrocrural region,  numerous  retroperitoneal, bilateral common iliac, right external iliac and  right inguinal stations compatible with lymphoma.      2. Healing fracture of the lateral left sixth rib with no associated  mass.     I have personally reviewed the examination and initial interpretation  and I agree with the findings.      PFTs FVC%  Recent Labs   Lab Test 07/27/20 0615 20003 82       FEV1%  Recent Labs   Lab Test 07/27/20 0615 20016 86       DLCO%  Recent Labs   Lab Test 07/27/20 0615 [id number] 110      ECHO or MUGA: ECHO: 7/20/20  Interpretation Summary  LVEF 64% based on biplane 2D tracing.  Global peak LV longitudinal strain is averaged at -19.4%. This is within  reported normal limits (normal <-18%).  Right ventricular function, chamber size, wall motion, and thickness are  normal.  The inferior vena cava is normal.  No pericardial effusion is present.  Previous study not available for comparison.     EKG   Sinus rhythm, ST and T wave abnormalities, consider lateral ischemia   Serologies: CMV +, EBV +, HSV +     Vitamin D No results for input(s): VITDT in the last 96883 hours.     Cardiology: NM scan 8/7/20  IMPRESSION:   1. No acute abnormality on CT images.  2. Partial visualization of retrocrural adenopathy, more fully  visualized on comparison PET-CT 7/22/2020.    I have assessed all abnormal lab values for their clinical significance and any values considered clinically significant have been addressed in the assessment and plan    Family History:   Family History   Problem Relation Age of Onset     Colon Cancer Mother      Lymphoma Father      No Known Problems Brother      No Known Problems Sister      No Known Problems Son      No Known Problems Sister      No Known Problems Son        Social History:   Social History     Socioeconomic History     Marital status:      Spouse name: Not on file     Number of children: Not on file     Years of education: Not on file     Highest education level: Not on  file   Occupational History     Not on file   Social Needs     Financial resource strain: Not on file     Food insecurity     Worry: Not on file     Inability: Not on file     Transportation needs     Medical: Not on file     Non-medical: Not on file   Tobacco Use     Smoking status: Former Smoker     Last attempt to quit: 1995     Years since quittin.2     Smokeless tobacco: Never Used   Substance and Sexual Activity     Alcohol use: Yes     Frequency: 4 or more times a week     Drinks per session: 3 or 4     Drug use: Yes     Types: Marijuana     Sexual activity: Not on file   Lifestyle     Physical activity     Days per week: Not on file     Minutes per session: Not on file     Stress: Not on file   Relationships     Social connections     Talks on phone: Not on file     Gets together: Not on file     Attends Caodaism service: Not on file     Active member of club or organization: Not on file     Attends meetings of clubs or organizations: Not on file     Relationship status: Not on file     Intimate partner violence     Fear of current or ex partner: Not on file     Emotionally abused: Not on file     Physically abused: Not on file     Forced sexual activity: Not on file   Other Topics Concern     Parent/sibling w/ CABG, MI or angioplasty before 65F 55M? Not Asked   Social History Narrative     Not on file       Past Medical History:   Past Medical History:   Diagnosis Date     Cancer (H)      Non Hodgkin's lymphoma (H)      Shortness of breath         Past Surgical History:   Past Surgical History:   Procedure Laterality Date     HERNIA REPAIR, UMBILICAL         Allergies:   Allergies   Allergen Reactions     Ondansetron Other (See Comments) and Itching       Home Medications      Prior to Admission medications    Medication Sig Start Date End Date Taking? Authorizing Provider   acyclovir (ZOVIRAX) 400 MG tablet Take 1 tablet (400 mg) by mouth every 12 hours 20   Rosa Terry MD    albuterol (PROAIR HFA/PROVENTIL HFA/VENTOLIN HFA) 108 (90 Base) MCG/ACT inhaler Inhale 2 puffs into the lungs every 6 hours as needed     Reported, Patient   allopurinol (ZYLOPRIM) 300 MG tablet Take 1 tablet (300 mg) by mouth daily 8/27/20   Tasia Liu PA   azelastine (ASTELIN) 0.1 % nasal spray Spray 1 spray in nostril as needed  4/15/19   Reported, Patient   beclomethasone HFA (QVAR REDIHALER) 40 MCG/ACT inhaler 2 puffs as needed     Reported, Patient   fluticasone (FLONASE) 50 MCG/ACT nasal spray  6/1/19   Reported, Patient   fluticasone (VERAMYST) 27.5 MCG/SPRAY nasal spray as needed     Reported, Patient   hydrOXYzine (VISTARIL) 50 MG capsule Take 50 mg by mouth as needed 6/19/20   Reported, Patient   LORazepam (ATIVAN) 0.5 MG tablet Take 1 tablet (0.5 mg) by mouth every 6 hours as needed for nausea 8/28/20   Tasia Liu PA   LORazepam (ATIVAN) 0.5 MG tablet Take 1 tablet (0.5 mg) by mouth every 4 hours as needed (Anxiety, Nausea/Vomiting or Sleep)  Patient not taking: Reported on 8/28/2020 8/27/20   Rosa Terry MD   medical cannabis (Patient's own supply)     Reported, Patient   Multiple Minerals-Vitamins (CALCIUM-MAGNESIUM-ZINC-D3) TABS 1 tablet by Oral or Feeding Tube route daily 8/19/20   Reported, Patient   Multiple Vitamin (DAILY-ROSARIO) TABS 1 tab(s)    Reported, Patient   naproxen (NAPROSYN) 500 MG tablet as needed     Reported, Patient   omeprazole (PRILOSEC) 20 MG DR capsule daily  1/28/20   Reported, Patient   prochlorperazine (COMPAZINE) 10 MG tablet Take 1 tablet (10 mg) by mouth every 6 hours as needed (Nausea/Vomiting) 8/28/20   Tasia Liu PA   Psyllium (METAMUCIL FIBER) 51.7 % PACK Take 1 packet by mouth daily     Reported, Patient   sildenafil (VIAGRA) 25 MG tablet Take 25 mg by mouth 6/18/20   Reported, Patient   sulfamethoxazole-trimethoprim (BACTRIM DS) 800-160 MG tablet Take 1 tablet by mouth Every Mon, Tues two times daily 8/31/20   Noe  HTOMAS Rowe   tolterodine ER (DETROL LA) 4 MG 24 hr capsule Take 4 mg by mouth daily  8/15/19   Reported, Patient       Review of Systems    Review of Systems:  CONSTITUTIONAL: NEGATIVE for fever, chills, change in weight  INTEGUMENTARY/SKIN: NEGATIVE for worrisome rashes, moles or lesions  EYES: NEGATIVE for vision changes or irritation  ENT/MOUTH: NEGATIVE for ear, mouth and throat problems  RESP: NEGATIVE for significant cough or SOB  BREAST: NEGATIVE for masses, tenderness or discharge  CV: NEGATIVE for chest pain, palpitations or peripheral edema  GI: Positive for nausea  : NEGATIVE for frequency, dysuria, or hematuria  MUSCULOSKELETAL: NEGATIVE for significant arthralgias or myalgia  NEURO: NEGATIVE for weakness, dizziness or paresthesias  ENDOCRINE: NEGATIVE for temperature intolerance, skin/hair changes  HEME/ALLERGY: NEGATIVE for bleeding problems  PSYCHIATRIC: NEGATIVE for changes in mood or affect    PHYSICAL EXAM      Weight     Wt Readings from Last 3 Encounters:   09/01/20 112.6 kg (248 lb 4.8 oz)   08/29/20 113.9 kg (251 lb 3.2 oz)   08/28/20 114.2 kg (251 lb 12.3 oz)        KPS: 90    /82 (BP Location: Right arm)   Pulse 68   Temp 97.4  F (36.3  C) (Oral)   Resp 16   Wt 111.8 kg (246 lb 6.4 oz)   SpO2 98%   BMI 38.00 kg/m       General: NAD   Eyes: JOE, sclera anicteric   Nose/Mouth/Throat: OP clear, buccal mucosa moist, no ulcerations   Lungs: CTA bilaterally  Cardiovascular: RRR, no M/R/G   Abdominal/Rectal: +BS, soft, NT, ND, No HSM   Lymphatics: No edema  Skin: No rashes or petechaie  Neuro: A&O   Additional Findings: Jacobs site NT, no drainage.  Access: New PICC left arm, clean/dry/intact    Acute GVHD Score:  0    ASSESSMENT BY SYSTEMS   Juvenal Carrerorachele is a 54 year old male with follicular lymphoma admitted for NAM NK cell therapy. Today is Day 0    Day 0 - NK cells + IL-2  Day 1 rest day  Day 2 NK cells + IL-2  Day 3 rest day (no tissue bx or bmbx needed)  Day 4 = IL-2,  can be given early in the day then patient can be discharged 4 hours after dose has been given  Day 10 = rituxan    1.  BMT/Cellular therapy: NAM NK for follicular lymphoma  - Allopurinol through day 7  - Flush and pre-meds prior to cell infusion  - GCSF - ok to start after D+14 if ANC < 500; cont until ANC > 1500 x 2 days  - D+28 PET scan    2.  HEME: Keep Hgb>7 and plts>10K. No pre-meds.                            3.  ID:   - prophy LD ACV (CMV+, HSV+, EBV+) and bactrim. Would add leva + fluc if he becomes neutropenic  - Hx hypogammaglobulinemia, has received IVIG                              4.  GI:  - Ativan and compazine prn, compazine works better for him  - Protonix for GI prophy  - Psyllium daily    5.  FEN/Renal:   - Monitor creat and lytes. Replete lytes PRN per SS.   - Monitor weight, I+O, lytes per protocol with IVF flush.    6.   - Hx prostate cancer s/p radical prostatectomy with positive margins + radiation through May 2018  - continue tolterodine    Juvenal Blake received signed copies of his consent forms in printed format.    Irma Bethea

## 2020-09-01 NOTE — PLAN OF CARE
/81 (BP Location: Right arm)   Pulse 67   Temp 97.6  F (36.4  C) (Oral)   Resp 16   Wt 111.8 kg (246 lb 6.4 oz)   SpO2 99%   BMI 38.00 kg/m      New admission for NAM NK. Afebrile, VSS. Mild nausea, given oral compazine X1 with relief. No c/o pain, vomiting or diarrhea. Tolerated NK Cell infusion well. 1/2NS infusing at 150ml/hr. To flush until 0425. Up independently. Supportive wife at bedside throughout day. Good appetite. Will get IL-2 at 2030 tonight. Continue to monitor, following plan of care.    Problem: Adult Inpatient Plan of Care  Goal: Plan of Care Review  Outcome: No Change

## 2020-09-02 ENCOUNTER — APPOINTMENT (OUTPATIENT)
Dept: OCCUPATIONAL THERAPY | Facility: CLINIC | Age: 55
DRG: 840 | End: 2020-09-02
Attending: INTERNAL MEDICINE
Payer: COMMERCIAL

## 2020-09-02 LAB
ANION GAP SERPL CALCULATED.3IONS-SCNC: 6 MMOL/L (ref 3–14)
BASOPHILS # BLD AUTO: 0 10E9/L (ref 0–0.2)
BASOPHILS NFR BLD AUTO: 0 %
BUN SERPL-MCNC: 14 MG/DL (ref 7–30)
CALCIUM SERPL-MCNC: 9.1 MG/DL (ref 8.5–10.1)
CHLORIDE SERPL-SCNC: 107 MMOL/L (ref 94–109)
CO2 SERPL-SCNC: 26 MMOL/L (ref 20–32)
CREAT SERPL-MCNC: 1.07 MG/DL (ref 0.66–1.25)
DIFFERENTIAL METHOD BLD: ABNORMAL
EOSINOPHIL # BLD AUTO: 0.1 10E9/L (ref 0–0.7)
EOSINOPHIL NFR BLD AUTO: 2.3 %
ERYTHROCYTE [DISTWIDTH] IN BLOOD BY AUTOMATED COUNT: 13.2 % (ref 10–15)
GFR SERPL CREATININE-BSD FRML MDRD: 77 ML/MIN/{1.73_M2}
GLUCOSE SERPL-MCNC: 115 MG/DL (ref 70–99)
HCT VFR BLD AUTO: 35.7 % (ref 40–53)
HGB BLD-MCNC: 11.9 G/DL (ref 13.3–17.7)
IMM GRANULOCYTES # BLD: 0 10E9/L (ref 0–0.4)
IMM GRANULOCYTES NFR BLD: 0.8 %
LYMPHOCYTES # BLD AUTO: 0 10E9/L (ref 0.8–5.3)
LYMPHOCYTES NFR BLD AUTO: 0.6 %
MAGNESIUM SERPL-MCNC: 2 MG/DL (ref 1.6–2.3)
MCH RBC QN AUTO: 30.1 PG (ref 26.5–33)
MCHC RBC AUTO-ENTMCNC: 33.3 G/DL (ref 31.5–36.5)
MCV RBC AUTO: 90 FL (ref 78–100)
MONOCYTES # BLD AUTO: 0.1 10E9/L (ref 0–1.3)
MONOCYTES NFR BLD AUTO: 1.7 %
NEUTROPHILS # BLD AUTO: 3.3 10E9/L (ref 1.6–8.3)
NEUTROPHILS NFR BLD AUTO: 94.6 %
NRBC # BLD AUTO: 0 10*3/UL
NRBC BLD AUTO-RTO: 0 /100
PLATELET # BLD AUTO: 127 10E9/L (ref 150–450)
POTASSIUM SERPL-SCNC: 3.8 MMOL/L (ref 3.4–5.3)
RBC # BLD AUTO: 3.96 10E12/L (ref 4.4–5.9)
SODIUM SERPL-SCNC: 139 MMOL/L (ref 133–144)
WBC # BLD AUTO: 3.5 10E9/L (ref 4–11)

## 2020-09-02 PROCEDURE — 83735 ASSAY OF MAGNESIUM: CPT | Performed by: PHYSICIAN ASSISTANT

## 2020-09-02 PROCEDURE — 25000132 ZZH RX MED GY IP 250 OP 250 PS 637: Performed by: PHYSICIAN ASSISTANT

## 2020-09-02 PROCEDURE — 80048 BASIC METABOLIC PNL TOTAL CA: CPT | Performed by: PHYSICIAN ASSISTANT

## 2020-09-02 PROCEDURE — 40000893 ZZH STATISTIC PT IP EVAL DEFER: Performed by: PHYSICAL THERAPIST

## 2020-09-02 PROCEDURE — 20600000 ZZH R&B BMT

## 2020-09-02 PROCEDURE — 97110 THERAPEUTIC EXERCISES: CPT | Mod: GO | Performed by: OCCUPATIONAL THERAPIST

## 2020-09-02 PROCEDURE — 25000128 H RX IP 250 OP 636: Performed by: PHYSICIAN ASSISTANT

## 2020-09-02 PROCEDURE — 97165 OT EVAL LOW COMPLEX 30 MIN: CPT | Mod: GO | Performed by: OCCUPATIONAL THERAPIST

## 2020-09-02 PROCEDURE — 25000132 ZZH RX MED GY IP 250 OP 250 PS 637

## 2020-09-02 PROCEDURE — 85025 COMPLETE CBC W/AUTO DIFF WBC: CPT | Performed by: PHYSICIAN ASSISTANT

## 2020-09-02 RX ORDER — PROCHLORPERAZINE MALEATE 5 MG
5 TABLET ORAL
Status: DISCONTINUED | OUTPATIENT
Start: 2020-09-02 | End: 2020-09-05 | Stop reason: HOSPADM

## 2020-09-02 RX ADMIN — SODIUM CHLORIDE, PRESERVATIVE FREE 5 ML: 5 INJECTION INTRAVENOUS at 05:10

## 2020-09-02 RX ADMIN — PSYLLIUM HUSK 1 PACKET: 3.4 POWDER ORAL at 08:55

## 2020-09-02 RX ADMIN — PROCHLORPERAZINE MALEATE 5 MG: 5 TABLET ORAL at 21:46

## 2020-09-02 RX ADMIN — ACYCLOVIR 400 MG: 400 TABLET ORAL at 20:44

## 2020-09-02 RX ADMIN — LEVOFLOXACIN 250 MG: 250 TABLET, FILM COATED ORAL at 09:44

## 2020-09-02 RX ADMIN — DIPHENHYDRAMINE HYDROCHLORIDE 25 MG: 25 CAPSULE ORAL at 00:40

## 2020-09-02 RX ADMIN — PROCHLORPERAZINE MALEATE 5 MG: 5 TABLET ORAL at 16:43

## 2020-09-02 RX ADMIN — HYDROXYZINE HYDROCHLORIDE 50 MG: 50 TABLET, FILM COATED ORAL at 05:07

## 2020-09-02 RX ADMIN — ALLOPURINOL 300 MG: 300 TABLET ORAL at 08:55

## 2020-09-02 RX ADMIN — ACETAMINOPHEN 650 MG: 325 TABLET, FILM COATED ORAL at 00:00

## 2020-09-02 RX ADMIN — PROCHLORPERAZINE MALEATE 5 MG: 5 TABLET ORAL at 12:59

## 2020-09-02 RX ADMIN — FLUCONAZOLE 100 MG: 100 TABLET ORAL at 08:55

## 2020-09-02 RX ADMIN — ACYCLOVIR 400 MG: 400 TABLET ORAL at 00:40

## 2020-09-02 RX ADMIN — PANTOPRAZOLE SODIUM 40 MG: 40 TABLET, DELAYED RELEASE ORAL at 08:55

## 2020-09-02 RX ADMIN — TOLTERODINE 4 MG: 4 CAPSULE, EXTENDED RELEASE ORAL at 08:55

## 2020-09-02 RX ADMIN — PROCHLORPERAZINE MALEATE 5 MG: 5 TABLET ORAL at 09:43

## 2020-09-02 RX ADMIN — ACETAMINOPHEN 650 MG: 325 TABLET, FILM COATED ORAL at 20:46

## 2020-09-02 RX ADMIN — PROCHLORPERAZINE MALEATE 10 MG: 5 TABLET ORAL at 03:52

## 2020-09-02 ASSESSMENT — ACTIVITIES OF DAILY LIVING (ADL)
ADLS_ACUITY_SCORE: 10
PREVIOUS_RESPONSIBILITIES: MEAL PREP;HOUSEKEEPING;WORK
ADLS_ACUITY_SCORE: 10

## 2020-09-02 ASSESSMENT — PAIN DESCRIPTION - DESCRIPTORS: DESCRIPTORS: DISCOMFORT

## 2020-09-02 NOTE — PLAN OF CARE
Discharge Planner OT   Patient plan for discharge: Home with spouse  Current status: Pt completing sit to stand and room ambulation independently. Pt verbalizing understanding of activity guidelines and lab values. Pt demonstrating neck and shoulder, and LE exercises 5 reps each.   Barriers to return to prior living situation: Deconditioning   Recommendations for discharge: Home with assist as needed  Rationale for recommendations: Pt may benefit from assistance with heavy IADL       Entered by: Kelsie Edmondson 09/02/2020 9:53 AM

## 2020-09-02 NOTE — PLAN OF CARE
[id number]  /79 (BP Location: Right arm)   Pulse 75   Temp 98.1  F (36.7  C) (Oral)   Resp 16   Wt 111.8 kg (246 lb 6.4 oz)   SpO2 98%   BMI 38.00 kg/m      Pt afebrile, vitals stable. Received IL-2 at 2110 yesterday. Monitored for any reactions, none noted over night. Pt felt warm in the evening, no fever and denied chills. Ordered fan to help. Pt has chronic itching, atarax x 1 given. Compazine x 2 given over night, intermittent nausea followed by one emesis. No replacements needed. Continue plan of care.     Problem: Adult Inpatient Plan of Care  Goal: Plan of Care Review  9/2/2020 0516 by Reji Boone, RN  Outcome: No Change     Problem: Nausea and Vomiting (Stem Cell/Bone Marrow Transplant)  Goal: Nausea and Vomiting Symptom Relief  9/2/2020 0516 by Reji Boone, RN  Outcome: Declining     Problem: Nutrition Intake Altered (Stem Cell/Bone Marrow Transplant)  Goal: Optimal Nutrition Intake  9/2/2020 0516 by Reji Boone, RN  Outcome: Declining

## 2020-09-02 NOTE — PROGRESS NOTES
BMT Daily Progress Note    ID: Juvenal Blake is a 54 year old male with follicular lymphoma admitted for [facility] NK cell therapy. Today is Day+1    HPI: Received NK cells + IL-2 yesterday. Went well. Around 10pm stood up and had a small, sudden emesis. Since then his stomach feels ok. No fevers. No myalgias. No injection site redness or pain.    ROS: negative except as stated above in HPI    Physical Exam  /74 (BP Location: Right arm)   Pulse 87   Temp 97.5  F (36.4  C) (Oral)   Resp 20   Wt 107.9 kg (237 lb 12.8 oz)   SpO2 99%   BMI 36.67 kg/m    General: NAD   Eyes: JOE, sclera anicteric   Nose/Mouth/Throat: OP clear, buccal mucosa moist, no ulcerations   Lungs: CTA bilaterally  Cardiovascular: RRR, no M/R/G   Abdominal/Rectal: +BS, soft, NT, ND, No HSM   Lymphatics: No edema  Skin: No rashes or petechaie  Neuro: A&O   Additional Findings: Jacobs site NT, no drainage.  Access: New PICC left arm, clean/dry/intact     Acute GVHD Score:  0    Lab  Lab Results   Component Value Date    WBC 3.5 (L) 09/02/2020    ANEU 3.3 09/02/2020    HGB 11.9 (L) 09/02/2020    HCT 35.7 (L) 09/02/2020     (L) 09/02/2020     09/02/2020    POTASSIUM 3.8 09/02/2020    CHLORIDE 107 09/02/2020    CO2 26 09/02/2020     (H) 09/02/2020    BUN 14 09/02/2020    CR 1.07 09/02/2020    MAG 2.0 09/02/2020    INR 1.04 09/01/2020    BILITOTAL 0.8 09/01/2020    AST 14 09/01/2020    ALT 34 09/01/2020    ALKPHOS 67 09/01/2020    PROTTOTAL 6.2 (L) 09/01/2020    ALBUMIN 3.6 09/01/2020     A/P:  Juvenal Blake is a 54 year old male with follicular lymphoma admitted for [facility] NK cell therapy. Today is Day +1     Day 0 - NK cells + IL-2  Day 1 rest day  Day 2 NK cells + IL-2  Day 3 rest day (no tissue bx or bmbx needed)  Day 4 = IL-2, can be given early in the day then patient can be discharged 4 hours after dose has been given  Day 10 = rituxan     1.  BMT/Cellular therapy: NAM NK for follicular lymphoma  - Allopurinol through  day 7  - Flush and pre-meds prior to cell infusion  - GCSF - ok to start after D+14 if ANC < 500; cont until ANC > 1500 x 2 days  - D+28 PET scan     2.  HEME: Keep Hgb>7 and plts>10K. No pre-meds.                            3.  ID:   - prophy LD ACV (CMV+, HSV+, EBV+) and bactrim. Would add leva + fluc if he becomes neutropenic  - Hx hypogammaglobulinemia, has received intermittent IVIG                              4.  GI:  - Ativan and compazine prn, compazine works better for him  - Protonix for GI prophy  - Psyllium daily     5.  FEN/Renal:   - Monitor creat and lytes. Replete lytes PRN per SS.   - Monitor weight, I+O, lytes per protocol with IVF flush.     6.   - Hx prostate cancer s/p radical prostatectomy with positive margins + radiation through May 2018  - continue tolterodine    Irma Bethea PA-C  [phone]      BMT Staff:  The patient was discussed on morning rounds with the nurses, mid level provider, and house staff and seen and examined by me. All labs and imaging were reviewed. I reviewed events over the last 24 hours including vitals and flow sheets. I agree with the above note and have been responsible for the care plan and interpretation of progress.    Subjective:  Reports feeling overall well, V yesterday did not recur, mild nausea, no abd pain, no F/C/NS, no N/V/C/D, no  complaints, no URI or other respiratory symptoms, no HA/LH/Dizziness.     Vitals reviewed, labs reviewed  PE:  NAD, Alert, oriented x3  HEENT: moist mmm, no oral ulcers  Heart: RRR  Lungs: CTAB  Abdomen: +BS, soft non tender, non distended  LE: no edema  Skin: no rashes    Assessment and Plan:  54 year old male with FL here for NAM NK Day +1. Monitor for toxicities. Nausea controlled. ID prophylaxis.    Stacy Sutherland MD

## 2020-09-02 NOTE — PROCEDURES
BMT Post Infusion Documentation    Data   Patient Vitals for the past 72 hrs:   Temp Temp src Pulse Resp BP   09/01/20 1050 97.4  F (36.3  C) Oral 68 16 116/82   09/01/20 1522 98.3  F (36.8  C) Oral 69 16 123/76   09/01/20 1555 98.3  F (36.8  C) Oral 65 16 124/87   09/01/20 1619 97.6  F (36.4  C) Oral 67 16 136/81   09/01/20 1941 98.5  F (36.9  C) Oral 58 16 129/77   09/01/20 2112 98.4  F (36.9  C) Oral 78 16 124/84   09/02/20 0037 98.3  F (36.8  C) Oral 66 16 115/73   09/02/20 0347 98.1  F (36.7  C) Oral 75 16 111/79   09/02/20 0833 97.5  F (36.4  C) Oral 87 20 125/74        BMT INFUSION DOCUMENTATION (last 24 hours)      BMT/Cellular Product Infusion     Row Name 09/01/20 1400                Cell Therapy Documentation    Product Release Date  09/01/20  -KZ       Recipient Study ID  N/A  -KZ       Donor  Allogeneic - Related  -KZ       Donor MRN/ID  [id number]  -KZ       Donor ABO/Rh  O pos  -KZ       Allogeneic Donor Eligibility Determination and Summary of Records  Eligible  -KZ       Type of Infusion  Allogeneic  -KZ       Total Volume Dispensed (mL)  136  -KZ       Total NC Dose  6.09E+07  -KZ       Total CD34 Dose  N/A  -KZ       Total CD3 dose  1.8E+05  -KZ       Total NC Dose Left in Storage  N/A  -KZ       Comments for Product Issues  NONE  -KZ       Donor ABO/Rh  --       Product Types  --       Product Numbers  --       Product Types and Numbers  --       Volume  --       ABO Mismatch  --       ZZTotal NC Dose  --       ZZTotal CD34 Dose  --       ZZTotal NC Dose Left in Storage  --          [REMOVED] Product 09/01/20 1305 NK Cells, Apheresis    Product Details Product Release Date: 09/01/20  -KZ Product Release Time: 1305  -KZ Product Type: NK Cells, Apheresis  -KZ DIN: [id number]  -KZ Product Description Code: P20132T9  -KZ Volume Dispensed (mL): 136 mL  -KZ Completion Date (RN to complete): 09/01/20  -LK Completion Time (RN to complete): 1625  -LK    Checked by (Patient RN)  Maria Luz Duenas RN  -LK        Checked by (Witness)  Sushma Soto RN  -NL       Product Volume Infused (mL)  136 mL  -LK       Flush Volume (mL)  30 mL  -LK       Volume Dispensed (mL)  --          RN Documentation    Patient was premedicated as ordered  yes  -LK       Line Type  central line, left  -LK       Patient Stable Prior to Infusion  yes  -LK       Time Infusion Started  1558  -LK       Checked by (Patient RN)  --       Checked by (RN 2)  --       Broken Bag?  --       Immediate suspected transfusion reaction to the product  --       Time Infusion Stopped  --       Total Flush Volume (mL)  --       Checked by (Witness)  --       Date Infusion Started  --       Date Infusion Stopped  --       Volume Infused (mL)  --       Total Volume Infused (cc)  --          Patient tolerance of product infusion    Immediate suspected transfusion reaction to the product  none  -LK       Did patient have prior history of similar signs/symptoms during this hospitalization?  NA  -LK       Symptoms during/after infusion  -- NA  -LK       Did the patient tolerate the infusion well  yes  -LK       Medications and treatment for symptoms  NA  -LK       Did the symptoms resolve?  -- NA  -LK       Enter comments if clots, leaks, broken bag, infusion delays, other issues with bag/infusion  NA  -LK       Describe symptoms  --         User Key  (r) = Recorded By, (t) = Taken By, (c) = Cosigned By    Initials Name Effective Dates    Emily Cortez 01/14/19 -     Maria Luz Burks RN 07/18/17 -     Sushma Yan 08/12/19 -             Post-Infusion Evaluation:   Infusion Related Reaction: Grade 0 - none  Dyspnea: Grade 0 - none  Hypoxia: Grade 0 - not present  Fever: Grade 0 - afebrile  Chills: Grade 0 - none  Febrile Neutropenia: Grade 0 - not present  Sinus Bradycardia: Grade 0 - none  Hypertension: Grade 0 - none  Hypotension: Grade 0 - none  Chest Pain: Grade 0 - none  Bronchospasm: Grade 0 - none  Pain: Grade 0 - none  Rash: Grade 0 -  None  Neurologic Specify: none    If this was a cord blood transplant, was more than one cord blood unit infused? N/A    Irma Bethea PA-C

## 2020-09-02 NOTE — PLAN OF CARE
Discharge Planner PT   Patient plan for discharge: Home with spouse  Current status: DEFER- Per discussion with OT, pt up without assist. No concerns with LE strength of  balance with activity. Anticipating short LOS ~4 days. No IP PT needs at this time. OT following to promote mobility, avoid deconditioning and provide lab value education.   Barriers to return to prior living situation: Medical status  Recommendations for discharge: Home with assist as needed  Rationale for recommendations: Pt may benefit from assistance with heavy IADL

## 2020-09-02 NOTE — PLAN OF CARE
/77 (BP Location: Right arm)   Pulse 79   Temp 98.6  F (37  C) (Oral)   Resp 16   Wt 107.9 kg (237 lb 12.8 oz)   SpO2 97%   BMI 36.67 kg/m      0700 - 1930: Afebrile, VSS. No c/o pain, vomiting or diarrhea. Intermittent nausea throughout the day. Oral compazine X1, then scheduled QID with some relief. Up independently, eating well. Hep locked. Supportive wife at beside. Showered today. Rest day, plan for NAM NK + IL2 tomorrow. Continue to monitor, following plan of care.     Problem: Adult Inpatient Plan of Care  Goal: Plan of Care Review  9/2/2020 1819 by Maria Luz Duenas, RN  Outcome: No Change     Problem: Nausea and Vomiting (Stem Cell/Bone Marrow Transplant)  Goal: Nausea and Vomiting Symptom Relief  9/2/2020 1819 by Maria Luz Duenas, RN  Outcome: No Change

## 2020-09-02 NOTE — PROGRESS NOTES
09/02/20 0900   Quick Adds   Type of Visit Initial Occupational Therapy Evaluation   Living Environment   Lives With spouse   Living Arrangements house   Home Accessibility no concerns;stairs to enter home   Number of Stairs, Main Entrance other (see comments)  (15)   Stair Railings, Main Entrance railings safe and in good condition   Transportation Anticipated family or friend will provide   Living Environment Comment Pt reports no concerns with home set-up   Self-Care   Usual Activity Tolerance excellent   Current Activity Tolerance good   Regular Exercise No   Equipment Currently Used at Home none   Functional Level   Ambulation 0-->independent   Transferring 0-->independent   Toileting 0-->independent   Bathing 0-->independent   Dressing 0-->independent   Eating 0-->independent   Communication 0-->understands/communicates without difficulty   Swallowing 0-->swallows foods/liquids without difficulty   Cognition 0 - no cognition issues reported   Fall history within last six months no   Which of the above functional risks had a recent onset or change? none   Prior Functional Level Comment Pt reports independence with ADL/IADL prior to hospitalization    General Information   Onset of Illness/Injury or Date of Surgery - Date 09/01/20   Referring Physician Dayday Campos MD    Patient/Family Goals Statement To go home   Additional Occupational Profile Info/Pertinent History of Current Problem Juvenal Blake is a 55yo M with follicular lymphoma. He was originally diagnosed in 2010 with a grade I-II follicular lymphoma. He was stage IV at diagnosis with marrow involvement. He was treated with bendamustine and Rituxan and achieved a CR. He then unfortunately relapsed in 12/2016 with a low-grade follicular lymphoma again.  He was again treated with Rituxan and bendamustine and again had achieved a complete remission.  He had a second relapse then in 01/2019 for which he was treated with O-CHOP and again  achieved a CR.  Imaging in 04/2020 showed adenopathy above and below the diaphragm.  He had an axillary node biopsied and this again showed a grade I-II follicular lymphoma which expressed CD19 and CD20.  The Ki-67 was 20%.    Precautions/Limitations no known precautions/limitations   Weight-Bearing Status - LUE full weight-bearing   Weight-Bearing Status - RUE full weight-bearing   Weight-Bearing Status - LLE full weight-bearing   Weight-Bearing Status - RLE full weight-bearing   Cognitive Status Examination   Orientation orientation to person, place and time   Level of Consciousness alert   Follows Commands (Cognition) WNL   Memory intact   Attention No deficits were identified   Organization/Problem Solving No deficits were identified   Executive Function No deficits were identified   Visual Perception   Visual Perception No deficits were identified   Sensory Examination   Sensory Quick Adds No deficits were identified   Pain Assessment   Patient Currently in Pain No   Integumentary/Edema   Integumentary/Edema no deficits were identifed   Range of Motion (ROM)   ROM Quick Adds No deficits were identified   Strength   Manual Muscle Testing Quick Adds No deficits were identified   Transfer Skill: Sit to Stand   Level of [place]: Sit/Stand independent   Instrumental Activities of Daily Living (IADL)   Previous Responsibilities meal prep;housekeeping;work   Activities of Daily Living Analysis   Impairments Contributing to Impaired Activities of Daily Living   (Anticipate deconditioning)   ADL Comments Anticipate deconditioning due to IP hospital stay and medical tx   General Therapy Interventions   Planned Therapy Interventions IADL retraining;ROM;strengthening;stretching;home program guidelines;progressive activity/exercise   Clinical Impression   Criteria for Skilled Therapeutic Interventions Met yes, treatment indicated   OT Diagnosis Anticipate impaired IADL   Influenced by the following impairments  "Deconditioning   Assessment of Occupational Performance 1-3 Performance Deficits   Identified Performance Deficits Meal prep, work   Clinical Decision Making (Complexity) Low complexity   Therapy Frequency 3x/week   Predicted Duration of Therapy Intervention (days/wks) 1 week   Anticipated Discharge Disposition Home   Risks and Benefits of Treatment have been explained. Yes   Patient, Family & other staff in agreement with plan of care Yes   Clinical Impression Comments Pt reporting independence in ADL/IADL prior to hospitalization. Pt verbalizing understanding of OT role, activity guidelines, and lab values. Pt dmeonstrating HEP exercises.   [facility] AM-PAC  \"6 Clicks\" Daily Activity Inpatient Short Form   1. Putting on and taking off regular lower body clothing? 4 - None   2. Bathing (including washing, rinsing, drying)? 4 - None   3. Toileting, which includes using toilet, bedpan or urinal? 4 - None   4. Putting on and taking off regular upper body clothing? 4 - None   5. Taking care of personal grooming such as brushing teeth? 4 - None   6. Eating meals? 4 - None   Daily Activity Raw Score (Score out of 24.Lower scores equate to lower levels of function) 24   Total Evaluation Time   Total Evaluation Time (Minutes) 5     "

## 2020-09-03 ENCOUNTER — RESULTS ONLY (OUTPATIENT)
Dept: LAB | Age: 55
End: 2020-09-03

## 2020-09-03 LAB
ANION GAP SERPL CALCULATED.3IONS-SCNC: 6 MMOL/L (ref 3–14)
BASOPHILS # BLD AUTO: 0 10E9/L (ref 0–0.2)
BASOPHILS NFR BLD AUTO: 0.4 %
BUN SERPL-MCNC: 14 MG/DL (ref 7–30)
CALCIUM SERPL-MCNC: 9.2 MG/DL (ref 8.5–10.1)
CHLORIDE SERPL-SCNC: 106 MMOL/L (ref 94–109)
CO2 SERPL-SCNC: 27 MMOL/L (ref 20–32)
CREAT SERPL-MCNC: 1.18 MG/DL (ref 0.66–1.25)
DIFFERENTIAL METHOD BLD: ABNORMAL
EOSINOPHIL # BLD AUTO: 0.2 10E9/L (ref 0–0.7)
EOSINOPHIL NFR BLD AUTO: 6.4 %
ERYTHROCYTE [DISTWIDTH] IN BLOOD BY AUTOMATED COUNT: 13 % (ref 10–15)
GFR SERPL CREATININE-BSD FRML MDRD: 68 ML/MIN/{1.73_M2}
GLUCOSE SERPL-MCNC: 113 MG/DL (ref 70–99)
HCT VFR BLD AUTO: 35.3 % (ref 40–53)
HGB BLD-MCNC: 11.7 G/DL (ref 13.3–17.7)
IMM GRANULOCYTES # BLD: 0 10E9/L (ref 0–0.4)
IMM GRANULOCYTES NFR BLD: 1.1 %
LYMPHOCYTES # BLD AUTO: 0.1 10E9/L (ref 0.8–5.3)
LYMPHOCYTES NFR BLD AUTO: 3.4 %
MAGNESIUM SERPL-MCNC: 2.3 MG/DL (ref 1.6–2.3)
MCH RBC QN AUTO: 29.5 PG (ref 26.5–33)
MCHC RBC AUTO-ENTMCNC: 33.1 G/DL (ref 31.5–36.5)
MCV RBC AUTO: 89 FL (ref 78–100)
MONOCYTES # BLD AUTO: 0.1 10E9/L (ref 0–1.3)
MONOCYTES NFR BLD AUTO: 5.3 %
NEUTROPHILS # BLD AUTO: 2.2 10E9/L (ref 1.6–8.3)
NEUTROPHILS NFR BLD AUTO: 83.4 %
NRBC # BLD AUTO: 0 10*3/UL
NRBC BLD AUTO-RTO: 0 /100
PLATELET # BLD AUTO: 125 10E9/L (ref 150–450)
POTASSIUM SERPL-SCNC: 4.1 MMOL/L (ref 3.4–5.3)
RBC # BLD AUTO: 3.97 10E12/L (ref 4.4–5.9)
SODIUM SERPL-SCNC: 138 MMOL/L (ref 133–144)
WBC # BLD AUTO: 2.7 10E9/L (ref 4–11)

## 2020-09-03 PROCEDURE — 83735 ASSAY OF MAGNESIUM: CPT | Performed by: PHYSICIAN ASSISTANT

## 2020-09-03 PROCEDURE — 80048 BASIC METABOLIC PNL TOTAL CA: CPT | Performed by: PHYSICIAN ASSISTANT

## 2020-09-03 PROCEDURE — 25000132 ZZH RX MED GY IP 250 OP 250 PS 637: Performed by: PHYSICIAN ASSISTANT

## 2020-09-03 PROCEDURE — 25800030 ZZH RX IP 258 OP 636: Performed by: PHYSICIAN ASSISTANT

## 2020-09-03 PROCEDURE — 25000128 H RX IP 250 OP 636: Performed by: PEDIATRICS

## 2020-09-03 PROCEDURE — 25000132 ZZH RX MED GY IP 250 OP 250 PS 637

## 2020-09-03 PROCEDURE — 85025 COMPLETE CBC W/AUTO DIFF WBC: CPT | Performed by: PHYSICIAN ASSISTANT

## 2020-09-03 PROCEDURE — 25000128 H RX IP 250 OP 636: Performed by: PHYSICIAN ASSISTANT

## 2020-09-03 PROCEDURE — 20600000 ZZH R&B BMT

## 2020-09-03 RX ORDER — HYDRALAZINE HYDROCHLORIDE 20 MG/ML
5 INJECTION INTRAMUSCULAR; INTRAVENOUS EVERY 6 HOURS PRN
Status: DISCONTINUED | OUTPATIENT
Start: 2020-09-03 | End: 2020-09-03

## 2020-09-03 RX ORDER — SODIUM CHLORIDE 9 MG/ML
INJECTION, SOLUTION INTRAVENOUS CONTINUOUS
Status: ACTIVE | OUTPATIENT
Start: 2020-09-03 | End: 2020-09-03

## 2020-09-03 RX ORDER — HYDRALAZINE HYDROCHLORIDE 20 MG/ML
5 INJECTION INTRAMUSCULAR; INTRAVENOUS ONCE
Status: COMPLETED | OUTPATIENT
Start: 2020-09-03 | End: 2020-09-03

## 2020-09-03 RX ADMIN — PROCHLORPERAZINE MALEATE 5 MG: 5 TABLET ORAL at 08:29

## 2020-09-03 RX ADMIN — FLUCONAZOLE 100 MG: 100 TABLET ORAL at 08:30

## 2020-09-03 RX ADMIN — ACYCLOVIR 400 MG: 400 TABLET ORAL at 08:29

## 2020-09-03 RX ADMIN — DIPHENHYDRAMINE HYDROCHLORIDE 25 MG: 25 CAPSULE ORAL at 21:30

## 2020-09-03 RX ADMIN — DIPHENHYDRAMINE HYDROCHLORIDE 25 MG: 25 CAPSULE ORAL at 16:31

## 2020-09-03 RX ADMIN — Medication 5 ML: at 23:57

## 2020-09-03 RX ADMIN — ACYCLOVIR 400 MG: 400 TABLET ORAL at 20:04

## 2020-09-03 RX ADMIN — PROCHLORPERAZINE MALEATE 5 MG: 5 TABLET ORAL at 22:06

## 2020-09-03 RX ADMIN — PROCHLORPERAZINE MALEATE 5 MG: 5 TABLET ORAL at 10:53

## 2020-09-03 RX ADMIN — HYDRALAZINE HYDROCHLORIDE 5 MG: 20 INJECTION INTRAMUSCULAR; INTRAVENOUS at 20:04

## 2020-09-03 RX ADMIN — PANTOPRAZOLE SODIUM 40 MG: 40 TABLET, DELAYED RELEASE ORAL at 08:29

## 2020-09-03 RX ADMIN — SODIUM CHLORIDE, PRESERVATIVE FREE 5 ML: 5 INJECTION INTRAVENOUS at 03:55

## 2020-09-03 RX ADMIN — SODIUM CHLORIDE: 9 INJECTION, SOLUTION INTRAVENOUS at 13:32

## 2020-09-03 RX ADMIN — LEVOFLOXACIN 250 MG: 250 TABLET, FILM COATED ORAL at 10:53

## 2020-09-03 RX ADMIN — TOLTERODINE 4 MG: 4 CAPSULE, EXTENDED RELEASE ORAL at 08:29

## 2020-09-03 RX ADMIN — ACETAMINOPHEN 650 MG: 325 TABLET, FILM COATED ORAL at 16:31

## 2020-09-03 RX ADMIN — PROCHLORPERAZINE MALEATE 5 MG: 5 TABLET ORAL at 16:31

## 2020-09-03 RX ADMIN — PSYLLIUM HUSK 1 PACKET: 3.4 POWDER ORAL at 08:30

## 2020-09-03 RX ADMIN — ACETAMINOPHEN 650 MG: 325 TABLET, FILM COATED ORAL at 21:30

## 2020-09-03 RX ADMIN — ALLOPURINOL 300 MG: 300 TABLET ORAL at 08:30

## 2020-09-03 RX ADMIN — HYDROXYZINE HYDROCHLORIDE 50 MG: 50 TABLET, FILM COATED ORAL at 18:57

## 2020-09-03 ASSESSMENT — ACTIVITIES OF DAILY LIVING (ADL)
ADLS_ACUITY_SCORE: 10

## 2020-09-03 NOTE — PROGRESS NOTES
BMT Daily Progress Note    ID: Juvenal Blake is a 54 year old male with follicular lymphoma admitted for [facility] NK cell therapy. Today is Day+2    HPI: No acute events overnight. Afebrile. On scheduled compazine. Mild nausea ongoing since LD chemo, no worse. No emesis overnight. Ordered cereal for breakfast. Redness at IL-2 injection site isn't bothersome to him.    ROS: negative except as stated above in HPI    Physical Exam  /82 (BP Location: Right arm)   Pulse 74   Temp 97.6  F (36.4  C) (Oral)   Resp 16   Wt 106.1 kg (233 lb 14.4 oz)   SpO2 99%   BMI 36.07 kg/m    General: NAD   Eyes: JOE, sclera anicteric   Nose/Mouth/Throat: OP clear, buccal mucosa moist, no ulcerations   Lungs: CTA bilaterally  Cardiovascular: RRR, no M/R/G   Abdominal/Rectal: +BS, soft, NT, ND, No HSM   Lymphatics: No edema  Skin: No rashes or petechaie. Erythema on LLQ abdomen at site of first IL-2 injection  Neuro: A&O   Additional Findings: Jacobs site NT, no drainage.  Access: New PICC left arm, clean/dry/intact     Acute GVHD Score:  0    Lab  Lab Results   Component Value Date    WBC 2.7 (L) 09/03/2020    ANEU 2.2 09/03/2020    HGB 11.7 (L) 09/03/2020    HCT 35.3 (L) 09/03/2020     (L) 09/03/2020     09/03/2020    POTASSIUM 4.1 09/03/2020    CHLORIDE 106 09/03/2020    CO2 27 09/03/2020     (H) 09/03/2020    BUN 14 09/03/2020    CR 1.18 09/03/2020    MAG 2.3 09/03/2020    INR 1.04 09/01/2020    BILITOTAL 0.8 09/01/2020    AST 14 09/01/2020    ALT 34 09/01/2020    ALKPHOS 67 09/01/2020    PROTTOTAL 6.2 (L) 09/01/2020    ALBUMIN 3.6 09/01/2020     A/P:  Juvenal Blake is a 54 year old male with follicular lymphoma admitted for [facility] NK cell therapy. Today is Day +2     Day 0 - NK cells + IL-2  Day 1 rest day  Day 2 NK cells + IL-2  Day 3 rest day (no tissue bx or bmbx needed)  Day 4 = IL-2, can be given early in the day then patient can be discharged 4 hours after dose has been given  Day 10 =  rituxan     1.  BMT/Cellular therapy: NAM NK for follicular lymphoma  - Allopurinol through day 7  - Flush and pre-meds prior to cell infusion  - GCSF - ok to start after D+14 if ANC < 500; cont until ANC > 1500 x 2 days  - D+28 PET scan     2.  HEME: Keep Hgb>7 and plts>10K. No pre-meds.                            3.  ID:   - prophy LD ACV (CMV+, HSV+, EBV+) and bactrim. Would add leva + fluc if he becomes neutropenic  - Hx hypogammaglobulinemia, has received intermittent IVIG                              4.  GI:  - added scheduled compazine QID for ongoing nausea after LD chemo  - Ativan and compazine prn, compazine works better for him  - Protonix for GI prophy  - Psyllium daily     5.  FEN/Renal:   - Monitor creat and lytes. Replete lytes PRN per SS.   - Monitor weight, I+O, lytes per protocol with IVF flush.     6.   - Hx prostate cancer s/p radical prostatectomy with positive margins + radiation through May 2018  - continue tolterodine    Irma Bethea PA-C  [phone]      BMT Staff:  The patient was discussed on morning rounds with the nurses, mid level provider, and house staff and seen and examined by me. All labs and imaging were reviewed. I reviewed events over the last 24 hours including vitals and flow sheets. I agree with the above note and have been responsible for the care plan and interpretation of progress.    Subjective:  Reports feeling overall well, V yesterday did not recur, mild nausea, no abd pain, no F/C/NS, no N/V/C/D, no  complaints, no URI or other respiratory symptoms, no HA/LH/Dizziness.     Vitals reviewed, labs reviewed  PE:  NAD, Alert, oriented x3  HEENT: moist mmm, no oral ulcers  Heart: RRR  Lungs: CTAB  Abdomen: +BS, soft non tender, non distended, IL-2 injection site erythema on Left LQ  LE: no edema  Skin: no rashes    Assessment and Plan:  54 year old male with FL here for NAM NK Day +2. Monitor for toxicities. Nausea controlled. ID prophylaxis.    Stacy Sutherland MD

## 2020-09-03 NOTE — PROGRESS NOTES
Below is the pt's psychosocial assessment for the staff to review as needed.    CLINICAL SOCIAL WORK   PSYCHOSOCIAL ASSESSMENT  BLOOD AND MARROW TRANSPLANT SERVICE        Assessment completed on July 21, 2020 of living situation, support system, financial status, functional status, coping, stressors, need for resources and social work intervention provided as needed.  Information for this assessment was provided by Pt  report in addition to medical chart review and consultation with medical team.      Present at assessment: Patient, Juvenal Blake was present for this assessment conducted by KRISTEN Rosario .      Diagnosis: Non-Hodgkin's Lymphoma (NHL)     Date of Diagnosis: 2019     Transplant type: NAM NK     Donor:   Related allogeneic donor stem cell transplant           Donor: Son     Physician: Gage Corral MD     Nurse Coordinator: Hoang Stoner RN     : CITLALLI Rosario, [facility]      Permanent Address:   [address] [place] [address]        Contact Information:  Pt Home Phone: [phone]  Pt Cell Phone: [phone]  Pt Email: [email or web address]  Pt's wife Nancy's  Phone: [phone]     Presenting Information:  Juvenal is a 54 year old male diagnosed with NHL who presents for evaluation for an NAM NK at the [facility] ([facility]  The pt's wife Nancy did listen to some of the visit today.      Decision Making:   Self      Health Care Directive:   Patient considering completing the pt has a HCD at home and is working through completing this document.     Relationship Status:    to his wife Nancy. Both indicate their relationship as stable and supportive.     Special Needs: None identified at this time.      Family/Support System: Pt endorsed a good support system including family and close friends who will be available to support Pt throughout transplant process.      Spouse: Nancy Blake     Children: 2 biological son's and  2 step-son's Juvenal Pascal Andrew, Josh     Grandchildren: 4 grandchildren Mikayla and Stevie are living with the pt     Parents:      Siblings: 3 siblings, 1 brother and 2 sisters     Friends: some close friends and co-workers     Caregiver: BERNIE discussed with pt the caregiver role and expectation at length. Pt is agreeable to having a full time caregiver for a minimum of 30 days until cleared by the BMT physician. Pt's identified caregivers are his wife and son's. Pt signed the caregiver contract which will be scanned into the EMR. Caregiver education and resources provided. No caregiver concerns identified. Pt and Pt's wife confirmed understanding caregiving requirement, including driving restrictions, as discussed during psychosocial assessment.      Name & Numbers  Nancy Blake [phone]     Transportation Mode:  Private Car . Pt is aware of driving restrictions post-BMT and the need for the caregiver is to drive until cleared to drive by the  BMT physician. SW provided information on parking info and monthly parking pass options. Pt will utilize the hospital security shuttle for transportation to and from the [facility] and BMT Clinic/Hospital.     Insurance:  No Insurance issues identified.Juvenal did share that he will either have to start paying for his insurance soon or will be switching to Nancy's depending on the price. Both work for the same employer so they are trying to figure out what is the best financial decision.  Pt denied specific insurance concerns at this time. BERNIE reiterated information about the BMT Financial  should specific insurance questions arise as Pt moves through transplant process.      Sources of Income:  Income concerns identified  The pt has stopped working at this time and has no income. He does have LTD through his job, but he needs to be out of work for 3 months prior for it to start. The pt did ask about applying for Social Security Disability and CSW did  explain how this process works. CSW also spoke with the pt about ryan options available and these applications were left for the pt in the clinic per his request.  Pt denied anticipation of financial hardship related to BMT at this time.  SW encouraged Pt to contact this SW for additional potential resources should financial situation change.      Employment:   Employer: [employer]  Position:   Last Day of Work: 7/15/20     Spouse's Employment:  Employer:  [employer]        Mental Health: Mental health symptoms currently identified        PHQ-9 assessment, score was 4 ,which indicates no current signs of depression.  GAD7 assessment, score was 11, which indicates moderate signs of anxiety.      We talked about how some patients may see an increase in feelings of anxiety or depression while hospitalized for extended periods along with isolation. Encouraged Juvenal and Nancy to let us know if they are noticing an increase in symptoms. We talked about the variety of modalities available to use as coping mechanisms (including but not limited to guided imagery, relaxation techniques, progressive muscle relaxation, counseling/talk therapy and medication).     Juvenal discussed that he is having an increase in his anxiety levels as he prepared for the BMT process. Overall, Juvenal notes moments of his mind wondering and change sin his mood. Juvenal shared that he has not done anything to change his anxiety, but feels that once he is in the hospital he will feel better that things are starting. Juvenal does note to smoking marijuana for nausea, but does think that it might help his anxiety as well. CSW discussed coping skills to help with his anxiety, but Juvenal feels things are tolerable for him at this time.      Chemical Use: Chemical use identified. Juvenal notes that he drinks alcohol daily- (3-4 at a time) and uses marijuana daily. Juvenal notes that the marijuana use is to help with his nausea.  "Discussed the need to abstain from these during the BMT process and the pt expressed understanding.  Update was provided to Dr. Corral.        Trauma/Loss/Abuse History: Multiple losses associated with cancer diagnosis and treatment, including health, employment, changes to physical appearance, etc.      Spirituality:  Patient does not identify with martha community.  Wife is Anglican and at times attend with her, but was raised [employer].      Coping: Pt noted that he is currently feeling \"nervous and ready to begin\".  Pt shared that his main coping mechanisms are being outside and talking to his wife.   SW and Pt discussed additional positive coping mechanisms that Pt can utilize while in the hospital.      Caregiver Coping: Not assessed during this process due to Nancy not being available.      Education Provided: Transplant process expectations, Caregiver requirements, Caregiver self-care, Financial issues related to transplant, Financial resources/grants available, Common psychosocial stressors pre/post transplant, Support group(s) available, Tour/layout of the inpatient unit/non-use of cell phones, Hospital resources available, Web site information, Resources for transplant patients and their families as well as the Clinical Social Work role.      Interventions Provided: Supportive counseling and education      Recreation/Leisure Activities:  Playing pool and gardening     Plans for Hospital Stay Leisure:  Watch movies     Assessment and Recommendations for Team:  Pt is a 54 year old male diagnosed with NHL who is here undergoing preparation for a planned NAM NK transplant.      Pt is a pleasant and well articulate male who feels comfortable communicating with the medical team. Pt has a good support team who are involved.      Pt may benefit from ongoing psychosocial support in regards to coping with the adjustment to the BMT process. Pt's family may benefit from ongoing psychosocial support in regards to " coping with the adjustment to the BMT process and may also benefit from attending the BMT Caregiver Support Group that meets weekly on the inpatient unit.      Pt has a good support system and a good caregiver plan. Pt verbalizes understanding of the transplant process and wanting to proceed. SW provided contact information and encouraged Pt to contact SW with questions, concerns, resources and for support. Per this assessment, I did not identify any barriers to this patient moving forward with transplant        Important Information:   - Juvenal would be interested in ryan applications.        Follow up Planned:   Initiate financial resources  Psychosocial support     CITLALLI Rosario, [facility]  Pager: [phone]  Phone: [phone]

## 2020-09-03 NOTE — PROCEDURES
BMT/Cellular Allogeneic Product Infusion       Patient Vitals for the past 24 hrs:   Temp Temp src Pulse Resp BP   09/02/20 1249 98.7  F (37.1  C) Oral 70 16 126/70   09/02/20 1614 98.6  F (37  C) Oral 79 16 125/77   09/02/20 2000 97.8  F (36.6  C) Oral 70 18 128/84   09/03/20 0000 97.1  F (36.2  C) Oral 62 18 123/67   09/03/20 0300 97  F (36.1  C) Oral 58 18 134/78   09/03/20 0843 97.6  F (36.4  C) Oral 74 16 135/82   09/03/20 1217 97.9  F (36.6  C) Oral 74 16 138/76         BMT INFUSION DOCUMENTATION (last 48 hours)      BMT/Cellular Product Infusion     Row Name                  [REMOVED] Product 09/01/20 1305 NK Cells, Apheresis    Product Details Product Release Date: 09/01/20  -KZ Product Release Time: 1305  -KZ Product Type: NK Cells, Apheresis  -KZ DIN: [id number]  -KZ Product Description Code: [id number]  -KZ Volume Dispensed (mL): 136 mL  -KZ Completion Date (RN to complete): 09/01/20  -LK Completion Time (RN to complete): 1625  -LK    Checked by (Patient RN)  --       Checked by (Witness)  --       Product Volume Infused (mL)  --       Flush Volume (mL)  --       Volume Dispensed (mL)  --          Product 09/03/20 NK Cells, Apheresis    Product Details Product Release Date: 09/03/20  - Product Type: NK Cells, Apheresis  - DIN: [id number]  -BJ Product Description Code: [id number]  -BJ Volume Dispensed (mL): 71 mL  -BJ    Checked by (Patient RN)  --       Checked by (Witness)  --       Product Volume Infused (mL)  --       Flush Volume (mL)  --       Volume Dispensed (mL)  --         User Key  (r) = Recorded By, (t) = Taken By, (c) = Cosigned By    Initials Name Effective Dates    Danette Irvin 05/16/18 -     Emily Cortez 01/14/19 -     Maria Luz Burks RN 07/18/17 -         Allogeneic Donor Eligibility Determination and Summary of Records: Eligible        Type of Infusion: Allogeneic      Baseline Pre-Infusion Evaluation (to be completed by Provider):   Dyspnea: Grade 0 -  none  Hypoxia: Grade 0 - not present  Fever: Grade 0 - afebrile  Chills: Grade 0 - none  Febrile Neutropenia: Grade 0 - not present  Sinus Bradycardia: Grade 0 - none  Hypertension: Grade 0 - none  Hypotension: Grade 0 - none  Chest Pain: Grade 0 - none  Bronchospasm: Grade 0 - none  Pain: Grade 0 - none  Rash: Grade 0 - None  Neurologic Specify: none    If adverse reactions, events or complications occur (fever greater than 2 degrees fahrenheit increase, and severe reactions of the following types: chills, dyspnea, bronchospasm, hyper/hypotension, hypoxia, bradycardia, chest pain, back/flank pain, hypoxia, and any other reaction deemed severe or life threatening; any instance of product bag breakage or unusual product appearance)    Any other events that are >= grade 3, then immediately contact the BMT Attending physician, the Cell Therapy Laboratory Medical Director (pager [phone]) and the Cell Therapy Laboratory ([phone]).  After midnight, holidays & weekends contact the [facility] on the appropriate campus ([facility] [place]: [phone]; [facility]: [phone]).    Irma Bethea PA-C

## 2020-09-03 NOTE — PLAN OF CARE
Afebrile. OVSS on RA. CPAP @ . Pt denies N/V/D. Shoulder pain, tylenol x1 given with relief. Pt will receive NK cells + IL-2 today, pre-infusion research labs sent. No replacements needed. Hep locked. Continue with POC.       Problem: Adult Inpatient Plan of Care  Goal: Plan of Care Review  9/3/2020 0528 by Ludivina Aquino, RN  Outcome: No Change     Problem: Adult Inpatient Plan of Care  Goal: Absence of Hospital-Acquired Illness or Injury  9/3/2020 0528 by Ludivina Aquino, RN  Outcome: No Change     Problem: Adult Inpatient Plan of Care  Goal: Optimal Comfort and Wellbeing  9/3/2020 0528 by Ludivina Aquino, RN  Outcome: No Change

## 2020-09-03 NOTE — PLAN OF CARE
Afebrile. Hypertension during NK cell infusion, resolved without intervention. IL-2 to be administered this evening. Pt offers no complaints this shift. Up independently, eating, drinking, and voiding well. Will flush until 2340. Nausea well controlled with scheduled compazine. Continue to monitor, following plan of care.     Addendum: at 1855, patient called RN into room. He notes itchiness to his abdomen. Upper thighs, abdomen, and sides are covered in lacy red rash. Provider notified. Will administer 50mg of hydroxyzine when it arrives from pharmacy.     Problem: Adult Inpatient Plan of Care  Goal: Plan of Care Review  9/3/2020 1826 by Glory Espinoza RN  Outcome: No Change  9/3/2020 0528 by Ludivina Aquino RN  Outcome: No Change  Goal: Patient-Specific Goal (Individualization)  Outcome: No Change  Goal: Absence of Hospital-Acquired Illness or Injury  9/3/2020 1826 by Glory Espinoza RN  Outcome: No Change  9/3/2020 0528 by Ludivina Aquino RN  Outcome: No Change  Goal: Optimal Comfort and Wellbeing  9/3/2020 1826 by Glory Espinoza RN  Outcome: No Change  9/3/2020 0528 by Ludivina Aquino RN  Outcome: No Change  Goal: Readiness for Transition of Care  Outcome: No Change  Goal: Rounds/Family Conference  Outcome: No Change     Problem: Adjustment to Transplant (Stem Cell/Bone Marrow Transplant)  Goal: Optimal Coping with Transplant  Outcome: No Change     Problem: Bladder Irritation (Stem Cell/Bone Marrow Transplant)  Goal: Symptom-Free Urinary Elimination  Outcome: No Change     Problem: Diarrhea (Stem Cell/Bone Marrow Transplant)  Goal: Diarrhea Symptom Control  Outcome: No Change     Problem: Fatigue (Stem Cell/Bone Marrow Transplant)  Goal: Energy Level Supports Daily Activity  Outcome: No Change     Problem: Hematologic Alteration (Stem Cell/Bone Marrow Transplant)  Goal: Blood Counts Within Acceptable Range  Outcome: No Change     Problem: Hypersensitivity Reaction (Stem Cell/Bone Marrow  Transplant)  Goal: Absence of Hypersensitivity Reaction  Outcome: No Change     Problem: Infection Risk (Stem Cell/Bone Marrow Transplant)  Goal: Absence of Infection Signs/Symptoms  Outcome: No Change     Problem: Mucositis (Stem Cell/Bone Marrow Transplant)  Goal: Mucous Membrane Health and Integrity  Outcome: No Change     Problem: Nausea and Vomiting (Stem Cell/Bone Marrow Transplant)  Goal: Nausea and Vomiting Symptom Relief  Outcome: No Change     Problem: Nutrition Intake Altered (Stem Cell/Bone Marrow Transplant)  Goal: Optimal Nutrition Intake  Outcome: No Change

## 2020-09-03 NOTE — PROGRESS NOTES
Type of transplant: Donor: Allogeneic - Related  Product:      BMT INFUSION DOCUMENTATION (last 48 hours)      BMT/Cellular Product Infusion     Row Name 09/03/20 1400                [REMOVED] Product 09/03/20 1609 NK Cells, Apheresis    Product Details Product Release Date: 09/03/20 -BJ Product Release Time: 1609  -ER Product Type: NK Cells, Apheresis  -BJ DIN: [id number]  -BJ Product Description Code: [id number]  -BJ Volume Dispensed (mL): 71 mL  -BJ Completion Date (RN to complete): 09/03/20  -MS Completion Time (RN to complete): 1717  -MS    Checked by (Patient RN)  Glory Espinoza RN  -MS       Checked by (Witness)  Amanda Beasley RN  -DREA       Product Volume Infused (mL)  71 mL  -MS       Flush Volume (mL)  40 mL  -MS       Volume Dispensed (mL)  --          [REMOVED] Product 09/03/20 1609 NK Cells, Apheresis    Product Details Product Release Date: 09/03/20 -BJ Product Release Time: 1609  -ER Product Type: NK Cells, Apheresis  -BJ DIN: [id number]  -BJ Product Description Code: [id number]  -BJ Volume Dispensed (mL): 71 mL  -BJ Completion Date (RN to complete): 09/03/20  -MS Completion Time (RN to complete): 1738  -MS    Checked by (Patient RN)  Glory Espinoza RN  -MS       Checked by (Witness)  Amanda PHILLIPS       Product Volume Infused (mL)  71 mL  -MS       Flush Volume (mL)  50 mL  -MS       Volume Dispensed (mL)  --         User Key  (r) = Recorded By, (t) = Taken By, (c) = Cosigned By    Initials Name Effective Dates    Danette Irvin 05/16/18 -     ER Cailin Haq 10/02/18 -     Amanda Boswell RN 04/30/15 -     Glory Christensen RN 02/16/16 -         Preparation: RN Documentation  Patient was premedicated as ordered: yes  Line Type: central line, left  Patient Stable Prior to Infusion: yes  Time Infusion Started: 1702  Teaching: side effects/monitoring  Tolerated/Reaction: Patient tolerance of product infusion  Immediate suspected transfusion reaction to the  product: none  Did patient have prior history of similar signs/symptoms during this hospitalization?: NA  Symptoms during/after infusion: hypertension  Did the patient tolerate the infusion well: yes  Medications and treatment for symptoms: NA  Did the symptoms resolve?: (NA)  Enter comments if clots, leaks, broken bag, infusion delays, other issues with bag/infusion: NA  Flush until: 2340 (6 hours post infusion)  Plan: IL-2 this evening at 2145. Continue to monitor, following plan of care.

## 2020-09-04 ENCOUNTER — CARE COORDINATION (OUTPATIENT)
Dept: ONCOLOGY | Facility: CLINIC | Age: 55
End: 2020-09-04

## 2020-09-04 ENCOUNTER — APPOINTMENT (OUTPATIENT)
Dept: OCCUPATIONAL THERAPY | Facility: CLINIC | Age: 55
DRG: 840 | End: 2020-09-04
Attending: INTERNAL MEDICINE
Payer: COMMERCIAL

## 2020-09-04 LAB
ABO + RH BLD: NORMAL
ABO + RH BLD: NORMAL
ANION GAP SERPL CALCULATED.3IONS-SCNC: 8 MMOL/L (ref 3–14)
BASOPHILS # BLD AUTO: 0 10E9/L (ref 0–0.2)
BASOPHILS NFR BLD AUTO: 0 %
BLD GP AB SCN SERPL QL: NORMAL
BLOOD BANK CMNT PATIENT-IMP: NORMAL
BUN SERPL-MCNC: 16 MG/DL (ref 7–30)
CALCIUM SERPL-MCNC: 8.9 MG/DL (ref 8.5–10.1)
CHLORIDE SERPL-SCNC: 109 MMOL/L (ref 94–109)
CO2 SERPL-SCNC: 23 MMOL/L (ref 20–32)
CREAT SERPL-MCNC: 1.16 MG/DL (ref 0.66–1.25)
DIFFERENTIAL METHOD BLD: ABNORMAL
EOSINOPHIL # BLD AUTO: 0.1 10E9/L (ref 0–0.7)
EOSINOPHIL NFR BLD AUTO: 3.2 %
ERYTHROCYTE [DISTWIDTH] IN BLOOD BY AUTOMATED COUNT: 13.1 % (ref 10–15)
GFR SERPL CREATININE-BSD FRML MDRD: 70 ML/MIN/{1.73_M2}
GLUCOSE SERPL-MCNC: 116 MG/DL (ref 70–99)
HCT VFR BLD AUTO: 33.4 % (ref 40–53)
HGB BLD-MCNC: 11.2 G/DL (ref 13.3–17.7)
IMM GRANULOCYTES # BLD: 0 10E9/L (ref 0–0.4)
IMM GRANULOCYTES NFR BLD: 1.2 %
LYMPHOCYTES # BLD AUTO: 0.1 10E9/L (ref 0.8–5.3)
LYMPHOCYTES NFR BLD AUTO: 1.4 %
MAGNESIUM SERPL-MCNC: 2.1 MG/DL (ref 1.6–2.3)
MCH RBC QN AUTO: 30.1 PG (ref 26.5–33)
MCHC RBC AUTO-ENTMCNC: 33.5 G/DL (ref 31.5–36.5)
MCV RBC AUTO: 90 FL (ref 78–100)
MONOCYTES # BLD AUTO: 0.1 10E9/L (ref 0–1.3)
MONOCYTES NFR BLD AUTO: 2 %
NEUTROPHILS # BLD AUTO: 3.2 10E9/L (ref 1.6–8.3)
NEUTROPHILS NFR BLD AUTO: 92.2 %
NRBC # BLD AUTO: 0 10*3/UL
NRBC BLD AUTO-RTO: 0 /100
PLATELET # BLD AUTO: 105 10E9/L (ref 150–450)
POTASSIUM SERPL-SCNC: 4 MMOL/L (ref 3.4–5.3)
RBC # BLD AUTO: 3.72 10E12/L (ref 4.4–5.9)
SODIUM SERPL-SCNC: 140 MMOL/L (ref 133–144)
SPECIMEN EXP DATE BLD: NORMAL
WBC # BLD AUTO: 3.5 10E9/L (ref 4–11)

## 2020-09-04 PROCEDURE — 87081 CULTURE SCREEN ONLY: CPT | Performed by: PHYSICIAN ASSISTANT

## 2020-09-04 PROCEDURE — 25000132 ZZH RX MED GY IP 250 OP 250 PS 637

## 2020-09-04 PROCEDURE — 86900 BLOOD TYPING SEROLOGIC ABO: CPT | Performed by: PHYSICIAN ASSISTANT

## 2020-09-04 PROCEDURE — 80048 BASIC METABOLIC PNL TOTAL CA: CPT | Performed by: PHYSICIAN ASSISTANT

## 2020-09-04 PROCEDURE — 20600000 ZZH R&B BMT

## 2020-09-04 PROCEDURE — 25000132 ZZH RX MED GY IP 250 OP 250 PS 637: Performed by: PHYSICIAN ASSISTANT

## 2020-09-04 PROCEDURE — 97530 THERAPEUTIC ACTIVITIES: CPT | Mod: GO | Performed by: OCCUPATIONAL THERAPIST

## 2020-09-04 PROCEDURE — 25000128 H RX IP 250 OP 636: Performed by: PHYSICIAN ASSISTANT

## 2020-09-04 PROCEDURE — 86850 RBC ANTIBODY SCREEN: CPT | Performed by: PHYSICIAN ASSISTANT

## 2020-09-04 PROCEDURE — 85025 COMPLETE CBC W/AUTO DIFF WBC: CPT | Performed by: PHYSICIAN ASSISTANT

## 2020-09-04 PROCEDURE — 86901 BLOOD TYPING SEROLOGIC RH(D): CPT | Performed by: PHYSICIAN ASSISTANT

## 2020-09-04 PROCEDURE — 83735 ASSAY OF MAGNESIUM: CPT | Performed by: PHYSICIAN ASSISTANT

## 2020-09-04 RX ORDER — PROCHLORPERAZINE MALEATE 5 MG
5 TABLET ORAL
Qty: 60 TABLET | Refills: 1 | Status: ON HOLD | OUTPATIENT
Start: 2020-09-04 | End: 2020-10-05

## 2020-09-04 RX ORDER — FLUCONAZOLE 100 MG/1
100 TABLET ORAL DAILY
Qty: 30 TABLET | Refills: 1 | Status: SHIPPED | OUTPATIENT
Start: 2020-09-05 | End: 2020-11-06

## 2020-09-04 RX ORDER — PANTOPRAZOLE SODIUM 40 MG/1
40 TABLET, DELAYED RELEASE ORAL DAILY
Qty: 14 TABLET | Refills: 0 | Status: SHIPPED | OUTPATIENT
Start: 2020-09-05 | End: 2020-09-04

## 2020-09-04 RX ORDER — HYDROXYZINE HYDROCHLORIDE 50 MG/1
50 TABLET, FILM COATED ORAL 3 TIMES DAILY PRN
Qty: 30 TABLET | Refills: 0 | Status: SHIPPED | OUTPATIENT
Start: 2020-09-04 | End: 2021-08-17

## 2020-09-04 RX ORDER — ALLOPURINOL 300 MG/1
300 TABLET ORAL DAILY
Qty: 2 TABLET | Refills: 0 | Status: SHIPPED | OUTPATIENT
Start: 2020-09-06 | End: 2020-09-08

## 2020-09-04 RX ADMIN — PSYLLIUM HUSK 1 PACKET: 3.4 POWDER ORAL at 07:52

## 2020-09-04 RX ADMIN — FLUCONAZOLE 100 MG: 100 TABLET ORAL at 07:55

## 2020-09-04 RX ADMIN — ALLOPURINOL 300 MG: 300 TABLET ORAL at 07:52

## 2020-09-04 RX ADMIN — PROCHLORPERAZINE MALEATE 5 MG: 5 TABLET ORAL at 07:52

## 2020-09-04 RX ADMIN — ACETAMINOPHEN 650 MG: 325 TABLET, FILM COATED ORAL at 23:50

## 2020-09-04 RX ADMIN — ACETAMINOPHEN 650 MG: 325 TABLET, FILM COATED ORAL at 17:32

## 2020-09-04 RX ADMIN — TOLTERODINE 4 MG: 4 CAPSULE, EXTENDED RELEASE ORAL at 07:52

## 2020-09-04 RX ADMIN — PROCHLORPERAZINE MALEATE 5 MG: 5 TABLET ORAL at 20:21

## 2020-09-04 RX ADMIN — ACETAMINOPHEN 650 MG: 325 TABLET, FILM COATED ORAL at 00:00

## 2020-09-04 RX ADMIN — PROCHLORPERAZINE MALEATE 5 MG: 5 TABLET ORAL at 11:18

## 2020-09-04 RX ADMIN — ACYCLOVIR 400 MG: 400 TABLET ORAL at 07:52

## 2020-09-04 RX ADMIN — PANTOPRAZOLE SODIUM 40 MG: 40 TABLET, DELAYED RELEASE ORAL at 07:52

## 2020-09-04 RX ADMIN — ACETAMINOPHEN 650 MG: 325 TABLET, FILM COATED ORAL at 11:23

## 2020-09-04 RX ADMIN — PROCHLORPERAZINE MALEATE 5 MG: 5 TABLET ORAL at 17:23

## 2020-09-04 RX ADMIN — SODIUM CHLORIDE, PRESERVATIVE FREE 5 ML: 5 INJECTION INTRAVENOUS at 03:59

## 2020-09-04 RX ADMIN — ACYCLOVIR 400 MG: 400 TABLET ORAL at 20:22

## 2020-09-04 RX ADMIN — DIPHENHYDRAMINE HYDROCHLORIDE 25 MG: 25 CAPSULE ORAL at 00:00

## 2020-09-04 ASSESSMENT — ACTIVITIES OF DAILY LIVING (ADL)
ADLS_ACUITY_SCORE: 10
ADLS_ACUITY_SCORE: 11
ADLS_ACUITY_SCORE: 10
ADLS_ACUITY_SCORE: 11
ADLS_ACUITY_SCORE: 10
ADLS_ACUITY_SCORE: 10

## 2020-09-04 ASSESSMENT — PAIN DESCRIPTION - DESCRIPTORS
DESCRIPTORS: ACHING
DESCRIPTORS: ACHING
DESCRIPTORS: ACHING;DISCOMFORT

## 2020-09-04 NOTE — PLAN OF CARE
/70 (BP Location: Right arm)   Pulse 87   Temp 98.3  F (36.8  C) (Oral)   Resp 18   Wt 106.1 kg (233 lb 14.4 oz)   SpO2 100%   BMI 36.07 kg/m    Pt's AVSS, stated pain at a comfortable level and maintaining sat's in the upper 90's while on home CPAP and on RA. Pt's tolerate IL-2 subcutaneous with no sign or symptom. Pt is up independently and voiding good amount. Pt's abd, leg and back rash seem to be getting better. AM lab stable with K+ 4.0, Mag 2.1, Hgb 11.2 and Plt of 105. Keep monitoring pt as ordered and notify MD with any new changes.   Problem: Adult Inpatient Plan of Care  Goal: Plan of Care Review  9/4/2020 0613 by Lillian Lomeli RN  Outcome: No Change  9/3/2020 1826 by Glory Espinoza RN  Outcome: No Change  Goal: Patient-Specific Goal (Individualization)  9/4/2020 0613 by Lillian Lomeli RN  Outcome: No Change  9/3/2020 1826 by Glory Espinoza RN  Outcome: No Change  Goal: Absence of Hospital-Acquired Illness or Injury  9/4/2020 0613 by Lillian Lomeli RN  Outcome: No Change  9/3/2020 1826 by Glory Espinoza RN  Outcome: No Change  Goal: Optimal Comfort and Wellbeing  9/4/2020 0613 by Lillian Lomeli RN  Outcome: No Change  9/3/2020 1826 by Glory Espinoza RN  Outcome: No Change  Goal: Readiness for Transition of Care  9/4/2020 0613 by Lillian Lomeli RN  Outcome: No Change  9/3/2020 1826 by Glory Espinoza RN  Outcome: No Change  Goal: Rounds/Family Conference  9/4/2020 0613 by Lillian Lomeli RN  Outcome: No Change  9/3/2020 1826 by Glory Espinoza RN  Outcome: No Change

## 2020-09-04 NOTE — PROCEDURES
BMT Post Infusion Documentation    Data   Patient Vitals for the past 72 hrs:   Temp Temp src Pulse Resp BP   09/01/20 1050 97.4  F (36.3  C) Oral 68 16 116/82   09/01/20 1522 98.3  F (36.8  C) Oral 69 16 123/76   09/01/20 1555 98.3  F (36.8  C) Oral 65 16 124/87   09/01/20 1619 97.6  F (36.4  C) Oral 67 16 136/81   09/01/20 1941 98.5  F (36.9  C) Oral 58 16 129/77   09/01/20 2112 98.4  F (36.9  C) Oral 78 16 124/84   09/02/20 0037 98.3  F (36.8  C) Oral 66 16 115/73   09/02/20 0347 98.1  F (36.7  C) Oral 75 16 111/79   09/02/20 0833 97.5  F (36.4  C) Oral 87 20 125/74   09/02/20 1249 98.7  F (37.1  C) Oral 70 16 126/70   09/02/20 1614 98.6  F (37  C) Oral 79 16 125/77   09/02/20 2000 97.8  F (36.6  C) Oral 70 18 128/84   09/03/20 0000 97.1  F (36.2  C) Oral 62 18 123/67   09/03/20 0300 97  F (36.1  C) Oral 58 18 134/78   09/03/20 0843 97.6  F (36.4  C) Oral 74 16 135/82   09/03/20 1217 97.9  F (36.6  C) Oral 74 16 138/76   09/03/20 1544 97.7  F (36.5  C) Oral 74 16 100/87   09/03/20 1658 98.4  F (36.9  C) Oral 70 18 126/84   09/03/20 1718 98.4  F (36.9  C) Oral 65 16 (!) 115/108   09/03/20 1739 98.2  F (36.8  C) Oral 77 18 128/84   09/03/20 1944 98  F (36.7  C) Oral 76 18 128/78   09/03/20 2130 98.1  F (36.7  C) Oral 71 18 114/77   09/04/20 0000 98.4  F (36.9  C) Oral 67 16 125/79   09/04/20 0357 98.3  F (36.8  C) Oral 87 18 127/70   09/04/20 0752 98  F (36.7  C) Axillary -- 18 117/77        BMT INFUSION DOCUMENTATION (last 24 hours)      BMT/Cellular Product Infusion     Row Name 09/03/20 1400                Cell Therapy Documentation    Product Release Date  09/03/20  -BJ       Recipient Study ID  N/A  -BJ       Donor  Allogeneic - Related  -BJ       Donor MRN/ID  [id number]  -BJ       Donor ABO/Rh  O pos  -BJ       Allogeneic Donor Eligibility Determination and Summary of Records  Eligible  -BJ       Type of Infusion  Allogeneic  -BJ       Total Volume Dispensed (mL)  142  -BJ       Total NC Dose  5.76E+07  -BJ        Total CD34 Dose  N/A  -BJ       Total CD3 dose  0.6E+05  -BJ       Total NC Dose Left in Storage  N/A  -BJ       Comments for Product Issues  NONE  -BJ       Donor ABO/Rh  --       Product Types  --       Product Numbers  --       Product Types and Numbers  --       Volume  --       ABO Mismatch  --       ZZTotal NC Dose  --       ZZTotal CD34 Dose  --       ZZTotal NC Dose Left in Storage  --          [REMOVED] Product 09/03/20 1609 NK Cells, Apheresis    Product Details Product Release Date: 09/03/20 -BJ Product Release Time: 1609  -ER Product Type: NK Cells, Apheresis  -BJ DIN: [id number]  -BJ Product Description Code: [id number]  -BJ Volume Dispensed (mL): 71 mL  -BJ Completion Date (RN to complete): 09/03/20  -MS Completion Time (RN to complete): 1717  -MS    Checked by (Patient RN)  Glory Espinoza RN  -MS       Checked by (Witness)  Amanda Beasley RN  -DREA       Product Volume Infused (mL)  71 mL  -MS       Flush Volume (mL)  40 mL  -MS       Volume Dispensed (mL)  --          [REMOVED] Product 09/03/20 1609 NK Cells, Apheresis    Product Details Product Release Date: 09/03/20 -BJ Product Release Time: 1609  -ER Product Type: NK Cells, Apheresis  -BJ DIN: [id number]  -BJ Product Description Code: [id number]  -BJ Volume Dispensed (mL): 71 mL  -BJ Completion Date (RN to complete): 09/03/20  -MS Completion Time (RN to complete): 1738  -MS    Checked by (Patient RN)  Glory Espinoza RN  -MS       Checked by (Witness)  Amanda PHILLIPS       Product Volume Infused (mL)  71 mL  -MS       Flush Volume (mL)  50 mL  -MS       Volume Dispensed (mL)  --          RN Documentation    Patient was premedicated as ordered  yes  -MS       Line Type  central line, left  -MS       Patient Stable Prior to Infusion  yes  -MS       Time Infusion Started  1702  -MS       Checked by (Patient RN)  --       Checked by (RN 2)  --       Broken Bag?  --       Immediate suspected transfusion reaction to the product  --        Time Infusion Stopped  --       Total Flush Volume (mL)  --       Checked by (Witness)  --       Date Infusion Started  --       Date Infusion Stopped  --       Volume Infused (mL)  --       Total Volume Infused (cc)  --          Patient tolerance of product infusion    Immediate suspected transfusion reaction to the product  none  -MS       Did patient have prior history of similar signs/symptoms during this hospitalization?  NA  -MS       Symptoms during/after infusion  hypertension  -MS       Did the patient tolerate the infusion well  yes  -MS       Medications and treatment for symptoms  NA  -MS       Did the symptoms resolve?  -- NA  -MS       Enter comments if clots, leaks, broken bag, infusion delays, other issues with bag/infusion  NA  -MS       Describe symptoms  --         User Key  (r) = Recorded By, (t) = Taken By, (c) = Cosigned By    Initials Name Effective Dates    Danette Irvin 05/16/18 -     Cailin Jerez 10/02/18 -     Amanda Boswell RN 04/30/15 -     Glory Christensen RN 02/16/16 -             Post-Infusion Evaluation:   Infusion Related Reaction: Grade 0 - none  Dyspnea: Grade 0 - none  Hypoxia: Grade 0 - not present  Fever: Grade 0 - afebrile  Chills: Grade 0 - none  Febrile Neutropenia: Grade 0 - not present  Sinus Bradycardia: Grade 0 - none  Hypertension: Grade 0 - none  Hypotension: Grade 0 - none  Chest Pain: Grade 0 - none  Bronchospasm: Grade 0 - none  Pain: Grade 0 - none  Rash: Grade 0 - None  Neurologic Specify: none    If this was a cord blood transplant, was more than one cord blood unit infused? N/A    Irma Bethea PA-C

## 2020-09-04 NOTE — PROGRESS NOTES
Care Coordination - Discharge Note     Line Company:  NA - plan is for PICC line removal prior to d/c  Referral made for line care:  No  IV medications needed at discharge:  None   Pharmacy concerns:  None. (Of note, [facility] requires prior auth)     PT/OT recommended:  No  Referral made for PT/OT:  NA    D/c location:  Home - [facility]  Has placement need been communicated to Social Work?  NA    Teaching time arranged with family:  Completed 9/4 with pt   Notify nurse to schedule line care class/ DM teaching, prior to d/c:  NA - plan is for PICC line removal prior to d/c    Caregiver:  Nancy (wife) [phone]     Outpatient Nurse Coordinator will be notified following patient discharge.       Tari Sol, RN, BSN  RN Care Coordinator - Inpatient BMT, Unit [address]  Ph [phone]  Pg [phone]

## 2020-09-04 NOTE — PLAN OF CARE
Discharge Planner OT   Patient plan for discharge: Home  Current status: Therapy student discussed HEP and lab values with pt, pt reported no questions. Pt reporting no concerns about getting needs met when he is at home. Therapy student encouraged pt to continue to stay active and continue HEP, pt was receptive to this information.   Barriers to return to prior living situation: Anticipated deconditioning  Recommendations for discharge: Home with assist as needed  Rationale for recommendations: Pt may benefit from assistance with heavy IADL       Entered by: Kelsie Edmondson 09/04/2020 1:55 PM        Occupational Therapy Discharge Summary    Reason for therapy discharge:    Discharged to home.  All goals and outcomes met, no further needs identified.    Progress towards therapy goal(s). See goals on Care Plan in [facility] electronic health record for goal details.  Goals met    Therapy recommendation(s):    Continue home exercise program.

## 2020-09-04 NOTE — PROGRESS NOTES
BMT Daily Progress Note    ID: Juvenal Blake is a 54 year old male with follicular lymphoma admitted for NAM NK cell therapy. Today is Day+3    HPI: Afebrile overnight. Second infusion of NK cells + IL-2 last night. After the NK cell infusion, called out because he felt itchy. Notes to have a rash on his legs/abdomen and flanks. Received atarax. Pruritis now resolved. Has multiple bug bites earlier this summer and reports on going itching over his legs. Nausea fairly well controlled on scheduled compazine. One loose stool yesterday. No pain at IL-2 injection sites.    ROS: negative except as stated above in HPI    Physical Exam  /77 (BP Location: Right arm)   Pulse 87   Temp 98  F (36.7  C) (Axillary)   Resp 18   Wt 107 kg (235 lb 14.4 oz)   SpO2 96%   BMI 36.38 kg/m    General: NAD   Eyes: JOE, sclera anicteric   Nose/Mouth/Throat: OP clear, buccal mucosa moist, no ulcerations   Lungs: CTA bilaterally  Cardiovascular: RRR, no M/R/G   Abdominal/Rectal: +BS, soft, NT, ND, No HSM   Lymphatics: No edema  Skin: Erythema over legs, erythema on LLQ abdomen at site of first IL-2 injection, none on RLQ. Mottled appearance over flanks/low back  Neuro: A&O   Additional Findings: Jacobs site NT, no drainage.  Access: New PICC left arm, clean/dry/intact     Acute GVHD Score:  0    Lab  Lab Results   Component Value Date    WBC 3.5 (L) 09/04/2020    ANEU 3.2 09/04/2020    HGB 11.2 (L) 09/04/2020    HCT 33.4 (L) 09/04/2020     (L) 09/04/2020     09/04/2020    POTASSIUM 4.0 09/04/2020    CHLORIDE 109 09/04/2020    CO2 23 09/04/2020     (H) 09/04/2020    BUN 16 09/04/2020    CR 1.16 09/04/2020    MAG 2.1 09/04/2020    INR 1.04 09/01/2020    BILITOTAL 0.8 09/01/2020    AST 14 09/01/2020    ALT 34 09/01/2020    ALKPHOS 67 09/01/2020    PROTTOTAL 6.2 (L) 09/01/2020    ALBUMIN 3.6 09/01/2020     A/P:  Juvenal Blake is a 54 year old male with follicular lymphoma admitted for NAM NK cell therapy.  Today is Day +3     Day 0 - NK cells + IL-2  Day 1 rest day  Day 2 NK cells + IL-2  Day 3 rest day (no tissue bx or bmbx needed)  Day 4 = IL-2, can be given early in the day then patient can be discharged 4 hours after dose has been given  Day 10 = rituxan     1.  BMT/Cellular therapy: NAM NK for follicular lymphoma  - Allopurinol through day 7  - Flush and pre-meds prior to cell infusion  - GCSF - ok to start after D+14 if ANC < 500; cont until ANC > 1500 x 2 days  - D+28 PET scan     2.  HEME: Keep Hgb>7 and plts>10K. No pre-meds.                            3.  ID:   - prophy fluc, LD ACV (CMV+, HSV+, EBV+) and bactrim. Would add leva if he becomes neutropenic  - Hx hypogammaglobulinemia, has received intermittent IVIG                              4.  GI:  - added scheduled compazine QID for ongoing nausea after LD chemo  - Ativan and compazine prn, compazine works better for him  - Protonix for GI prophy  - Psyllium daily     5.  FEN/Renal:   - Monitor creat and lytes. Replete lytes PRN per SS.   - Monitor weight, I+O, lytes per protocol with IVF flush.     6.   - Hx prostate cancer s/p radical prostatectomy with positive margins + radiation through May 2018  - continue tolterodine    7. Derm  - atarax prn for itching. Doesn't look like hives or drug allergy. Will watch for now    Dispo: Plan for last dose IL-2 tomorrow, then can discharge 4 hours afterwards as long as clinically well  - ok to remove PICC line prior to discharge    Irma Bethea PA-C  [phone]      BMT Staff:  The patient was discussed on morning rounds with the nurses, mid level provider, and house staff and seen and examined by me. All labs and imaging were reviewed. I reviewed events over the last 24 hours including vitals and flow sheets. I agree with the above note and have been responsible for the care plan and interpretation of progress.    Subjective:  Reports feeling overall well, well controlled N, no abd pain, no F/C/NS, no N/V/C/D, no   complaints, no URI or other respiratory symptoms, no HA/LH/Dizziness.     Vitals reviewed, labs reviewed  PE:  NAD, Alert, oriented x3  HEENT: moist mmm, no oral ulcers  Heart: RRR  Lungs: CTAB  Abdomen: +BS, soft non tender, non distended, IL-2 injection site erythema on Left LQ  LE: no edema  Skin: no rashes    Assessment and Plan:  54 year old male with FL here for NAM NK Day +3. Monitor for toxicities. Nausea controlled. ID prophylaxis.    Stacy Sutherland MD

## 2020-09-04 NOTE — PLAN OF CARE
/72 (BP Location: Right arm)   Pulse 87   Temp 98.3  F (36.8  C) (Oral)   Resp 16   Wt 107 kg (235 lb 14.4 oz)   SpO2 97%   BMI 36.38 kg/m    PICC is C/D/I and HL. Patient is A & O x 4. Patient c/o mild lower back pain and lateral chest aches received tylenol 650 mg po x 1 with relief. Patient continues to c/o mild nausea without emesis and received compazine 5 mg by mouth with some relief. Patient have had multiple bug bites earlier this summer and reports on going itching over his leg but skin rash(s) are all over his body. Patient will have IL-2 injection on tomorrow at 10 am. Possible discharge to home tomorrow with his wife. Continue to encourage patient to do good skin cares, mouth cares and sit up in the chair while eating or drinking. Continue with plan of care and notify MD for status changes.    Problem: Adult Inpatient Plan of Care  Goal: Plan of Care Review  9/4/2020 1349 by Marce Collins RN  Outcome: No Change  Flowsheets (Taken 9/4/2020 1349)  Plan of Care Reviewed With:    patient    spouse    caregiver     Problem: Adult Inpatient Plan of Care  Goal: Absence of Hospital-Acquired Illness or Injury  9/4/2020 1349 by Marce Collins RN  Outcome: No Change     Problem: Adult Inpatient Plan of Care  Goal: Readiness for Transition of Care  9/4/2020 1349 by Marce Collins RN  Outcome: No Change     Problem: Mucositis (Stem Cell/Bone Marrow Transplant)  Goal: Mucous Membrane Health and Integrity  9/4/2020 1349 by Marce Collins RN  Outcome: No Change

## 2020-09-04 NOTE — SUMMARY OF CARE
Discharge Medications     Juvenal Blake   Home Medication Instructions JONNY:[id number]    Printed on:09/04/20 3952   Medication Information                      acyclovir (ZOVIRAX) 400 MG tablet  Take 1 tablet (400 mg) by mouth every 12 hours             allopurinol (ZYLOPRIM) 300 MG tablet  Take 1 tablet (300 mg) by mouth daily for 2 days on 9/6 and 9/7, then stop on 9/8             fluconazole (DIFLUCAN) 100 MG tablet  Take 1 tablet (100 mg) by mouth daily             hydrOXYzine (ATARAX) 50 MG tablet  Take 1 tablet (50 mg) by mouth 3 times daily as needed for itching             LORazepam (ATIVAN) 0.5 MG tablet  Take 1 tablet (0.5 mg) by mouth every 4 hours as needed (Anxiety, Nausea/Vomiting or Sleep)             omeprazole (PRILOSEC) 20 MG DR capsule  Take 1 capsule (20 mg) by mouth daily             prochlorperazine (COMPAZINE) 5 MG tablet  Take 1 tablet (5 mg) by mouth 4 times daily (before meals and nightly)             Psyllium (METAMUCIL FIBER) 51.7 % PACK  Take 1 packet by mouth daily              sulfamethoxazole-trimethoprim (BACTRIM DS) 800-160 MG tablet  Take 1 tablet by mouth Every Mon, Tues two times daily             tolterodine ER (DETROL LA) 4 MG 24 hr capsule  Take 4 mg by mouth daily

## 2020-09-04 NOTE — PROGRESS NOTES
BMT Teaching Flowsheet    Juvenal Blake is a 54 year old male  The encounter diagnosis was Follicular lymphoma grade i, lymph nodes of multiple sites (H).    Teaching Topic: NAM-NK cellular product infusion discharge teaching    Person(s) involved in teaching: Patient  Motivation Level  Asks Questions: Yes  Eager to Learn: Yes  Cooperative: Yes  Receptive (willing/able to accept information): Yes  Any cultural factors/Syn[employer] beliefs that may influence understanding or compliance? No    Patient demonstrates understanding of the following:  - Reason for the appointment, diagnosis and treatment plan: Yes  - Knowledge of proper use of medications and conditions for which they are ordered (with special attention to potential side effects or drug interactions): No, explain: Bedside RN to complete medication teaching with patient and caregiver prior to discharge.   - Which situations necessitate calling provider and whom to contact: Yes    Teaching concerns addressed: None identified    Proper use and care of (medical equipment, care aids, etc.) Yes  Pain management techniques: Yes  Patient instructed on hand hygiene: Yes  How and/when to access community resources: Yes    Infection Control:  Patient demonstrates understanding of the following:  Surgical procedure site care taught NA  Signs and symptoms of infection taught Yes  Wound care taught NA  Central venous catheter care taught No, explain: NA - plan is for PICC line removal prior to discharge.    Instructional Materials Used/Given:   Discharge teaching completed with patient. Written guidelines provided including clinic follow up, signs and symptoms to report, infection prevention, and contact list. Discussed activities to avoid to prevent infections and mask guidelines. Pt verbalized understanding of material via teach back and all questions have been answered.    Time spent with patient: 10 minutes.    Specific Concerns: NA

## 2020-09-04 NOTE — PROGRESS NOTES
IL-2 assessment 24hrs after-  Rash-greater than 30%..faint- pt states fading-non itchy  Edema-trace ankle..    Pt reports muscle cramps in side of abd the past 2 days...  Prior to adm in hands. .

## 2020-09-04 NOTE — SUMMARY OF CARE
BMT Summary of Care    September 4, 2020 11:38 AM  Juvenal Blake  MRN: [id number]    Discharge Date: 9/5/20     BMT Primary Physician: Dr. Corral    BMT Nurse Coordinator: Hoang Stoner    Discharge Diagnosis: S/p NK cell therapy    Discharge To: Home    Activity: As tolerated    Catheter Care: PICC - ok to remove prior to discharge    Vascular Access Device Protocol Per Policy  None    IV Medications through home infusion: None    Nutrition: Regular diet as tolerated    Blood Transfusions:  Transfuse if Hemoglobin < or equal 7 mg/dL  Red Blood Cell Order: 1 units, irradiated and leukoreduced   Transfuse if Platelet count < or equal 10,000 uL  Platelet order: 1 adult dose, irradiated and leukoreduced  Transfusion Pre-meds:  None    Intravenous Electrolyte Replacement:  Potassium  chloride (give only if serum creatinine < 2)     3-3.3  20mEq/hr  over 1 hour x 2 doses     <3      20mEq/hr  over 1 hour x 3 doses  Magnesium sulfate 1.3-1.7     2 grams over 1 hour x1 dose                                     <1.3         2 grams over 2 hours x1 dose    Laboratory Tests:  At next clinic appointment (date: 9/8/20)  Hemogram (CBC) differential, platelet count  Comprehensive Metabolic Panel    Support Services:  None    Appointments:   BMT Clinic (date, time, provider):   1. Tuesday, 9/8 check in at 9:30am for labs and 10am for provider visit    Irma Bethea PA-C      BMT Contact Information:-   For issues including fevers 100.5 or more:  Please call during the week: Monday through Friday between hours of 8:00 am and 4:30 pm- Call BMT office- [phone]  After hours/Weekends: Please call [facility] : [phone] and ask for BMT physician on call and the  will have the BMT physician call you back.    Regarding COVID-19 Pandemic  Advice for Patients:  a.       Avoid contact with individuals:   i.     Who are sick or have recently been sick  ii.      Have traveled to  high risk areas (per CDC guidelines) or have been on a cruise in the last 14 days  iii.      Who were or could have been exposed to COVID-19   b.       If experiencing symptoms such as: Fever, cough or shortness of breath contact BMT at [phone] Mon-Fri 8am-4:30pm or After Hours at [phone] (ask to speak to a BMT Fellow) for guidance on need for clinical assessment  c.      Avoid all non- essential travel at this time; if traveling is necessary use mask (N-95)   d.    Wear a mask when in public areas  d.       Avoid crowded places, if possible  f.        Follow CDC advice https://www.cdc.gov/coronavirus/2019-ncov/index.html and travel guidelines https://www.cdc.gov/coronavirus/2019-ncov/travelers/index.html

## 2020-09-05 ENCOUNTER — RESULTS ONLY (OUTPATIENT)
Dept: LAB | Age: 55
End: 2020-09-05

## 2020-09-05 VITALS
OXYGEN SATURATION: 99 % | WEIGHT: 233.2 LBS | HEART RATE: 71 BPM | SYSTOLIC BLOOD PRESSURE: 130 MMHG | TEMPERATURE: 97.7 F | DIASTOLIC BLOOD PRESSURE: 86 MMHG | RESPIRATION RATE: 18 BRPM | BODY MASS INDEX: 35.96 KG/M2

## 2020-09-05 LAB
ANION GAP SERPL CALCULATED.3IONS-SCNC: 3 MMOL/L (ref 3–14)
BASOPHILS # BLD AUTO: 0 10E9/L (ref 0–0.2)
BASOPHILS NFR BLD AUTO: 0 %
BUN SERPL-MCNC: 18 MG/DL (ref 7–30)
CALCIUM SERPL-MCNC: 9.2 MG/DL (ref 8.5–10.1)
CHLORIDE SERPL-SCNC: 107 MMOL/L (ref 94–109)
CO2 SERPL-SCNC: 29 MMOL/L (ref 20–32)
CREAT SERPL-MCNC: 1.05 MG/DL (ref 0.66–1.25)
DIFFERENTIAL METHOD BLD: ABNORMAL
EOSINOPHIL # BLD AUTO: 0.2 10E9/L (ref 0–0.7)
EOSINOPHIL NFR BLD AUTO: 7.5 %
ERYTHROCYTE [DISTWIDTH] IN BLOOD BY AUTOMATED COUNT: 13.1 % (ref 10–15)
GFR SERPL CREATININE-BSD FRML MDRD: 78 ML/MIN/{1.73_M2}
GLUCOSE SERPL-MCNC: 109 MG/DL (ref 70–99)
HCT VFR BLD AUTO: 32.8 % (ref 40–53)
HGB BLD-MCNC: 10.9 G/DL (ref 13.3–17.7)
IMM GRANULOCYTES # BLD: 0.1 10E9/L (ref 0–0.4)
IMM GRANULOCYTES NFR BLD: 2.5 %
LYMPHOCYTES # BLD AUTO: 0.2 10E9/L (ref 0.8–5.3)
LYMPHOCYTES NFR BLD AUTO: 7.9 %
MCH RBC QN AUTO: 29.9 PG (ref 26.5–33)
MCHC RBC AUTO-ENTMCNC: 33.2 G/DL (ref 31.5–36.5)
MCV RBC AUTO: 90 FL (ref 78–100)
MONOCYTES # BLD AUTO: 0.1 10E9/L (ref 0–1.3)
MONOCYTES NFR BLD AUTO: 5.4 %
NEUTROPHILS # BLD AUTO: 1.9 10E9/L (ref 1.6–8.3)
NEUTROPHILS NFR BLD AUTO: 76.7 %
NRBC # BLD AUTO: 0 10*3/UL
NRBC BLD AUTO-RTO: 0 /100
PLATELET # BLD AUTO: 115 10E9/L (ref 150–450)
POTASSIUM SERPL-SCNC: 4.3 MMOL/L (ref 3.4–5.3)
RBC # BLD AUTO: 3.65 10E12/L (ref 4.4–5.9)
RESEARCH KIT COLLECTION: NORMAL
SODIUM SERPL-SCNC: 140 MMOL/L (ref 133–144)
WBC # BLD AUTO: 2.4 10E9/L (ref 4–11)

## 2020-09-05 PROCEDURE — 25000128 H RX IP 250 OP 636: Performed by: PHYSICIAN ASSISTANT

## 2020-09-05 PROCEDURE — 85025 COMPLETE CBC W/AUTO DIFF WBC: CPT | Performed by: PHYSICIAN ASSISTANT

## 2020-09-05 PROCEDURE — 25000132 ZZH RX MED GY IP 250 OP 250 PS 637: Performed by: PHYSICIAN ASSISTANT

## 2020-09-05 PROCEDURE — 40000937 ZZHCL STATISTIC RESEARCH KIT COLLECTION

## 2020-09-05 PROCEDURE — 25000132 ZZH RX MED GY IP 250 OP 250 PS 637

## 2020-09-05 PROCEDURE — 80048 BASIC METABOLIC PNL TOTAL CA: CPT | Performed by: PHYSICIAN ASSISTANT

## 2020-09-05 RX ADMIN — PROCHLORPERAZINE MALEATE 5 MG: 5 TABLET ORAL at 11:28

## 2020-09-05 RX ADMIN — ALLOPURINOL 300 MG: 300 TABLET ORAL at 09:10

## 2020-09-05 RX ADMIN — DIPHENHYDRAMINE HYDROCHLORIDE 25 MG: 25 CAPSULE ORAL at 09:10

## 2020-09-05 RX ADMIN — DIPHENHYDRAMINE HYDROCHLORIDE 25 MG: 25 CAPSULE ORAL at 14:10

## 2020-09-05 RX ADMIN — ACETAMINOPHEN 650 MG: 325 TABLET, FILM COATED ORAL at 09:10

## 2020-09-05 RX ADMIN — PSYLLIUM HUSK 1 PACKET: 3.4 POWDER ORAL at 09:10

## 2020-09-05 RX ADMIN — PROCHLORPERAZINE MALEATE 5 MG: 5 TABLET ORAL at 09:09

## 2020-09-05 RX ADMIN — PANTOPRAZOLE SODIUM 40 MG: 40 TABLET, DELAYED RELEASE ORAL at 09:10

## 2020-09-05 RX ADMIN — TOLTERODINE 4 MG: 4 CAPSULE, EXTENDED RELEASE ORAL at 09:10

## 2020-09-05 RX ADMIN — ACETAMINOPHEN 650 MG: 325 TABLET, FILM COATED ORAL at 14:09

## 2020-09-05 RX ADMIN — ACYCLOVIR 400 MG: 400 TABLET ORAL at 09:10

## 2020-09-05 RX ADMIN — SODIUM CHLORIDE, PRESERVATIVE FREE 5 ML: 5 INJECTION INTRAVENOUS at 04:20

## 2020-09-05 RX ADMIN — FLUCONAZOLE 100 MG: 100 TABLET ORAL at 09:10

## 2020-09-05 ASSESSMENT — ACTIVITIES OF DAILY LIVING (ADL)
ADLS_ACUITY_SCORE: 10

## 2020-09-05 ASSESSMENT — PAIN DESCRIPTION - DESCRIPTORS: DESCRIPTORS: ACHING;HEADACHE

## 2020-09-05 NOTE — PROGRESS NOTES
BMT Daily Progress Note    ID: Juvenal Blake is a 54 year old male with follicular lymphoma admitted for [facility] NK cell therapy. Today is Day+4    HPI: Feeling good & anxious to go home.  Last dose of IL-2 today.  Afebrile with no cough or congestion.  Eating with no N/V/D.  No pain or bleeding.  Erythematous rash to trunk & UE.      VS:  /Blood pressure 139/86, pulse 77, temperature 97.7  F (36.5  C), temperature source Oral, resp. rate 16, weight 105.8 kg (233 lb 3.2 oz), SpO2 99 %.    ROS: negative except as stated above in HPI    Physical Exam  /86 (BP Location: Right arm)   Pulse 77   Temp 97.7  F (36.5  C) (Oral)   Resp 16   Wt 105.8 kg (233 lb 3.2 oz)   SpO2 99%   BMI 35.96 kg/m    General: NAD   Eyes: JOE, sclera anicteric   Nose/Mouth/Throat: OP clear, buccal mucosa moist, no ulcerations   Lungs: CTA bilaterally  Cardiovascular: RRR, no M/R/G   Abdominal/Rectal: +BS, soft, NT, ND  Lymphatics: No edema  Skin: Erythematous macular rash to trunk, UE. Mottled appearance over flanks/low back  Neuro: A&O   Additional Findings: Jacobs site NT, no drainage.  Access: New PICC left arm, clean/dry/intact     Acute GVHD Score:  0    Lab  Lab Results   Component Value Date    WBC 2.4 (L) 09/05/2020    ANEU 1.9 09/05/2020    HGB 10.9 (L) 09/05/2020    HCT 32.8 (L) 09/05/2020     (L) 09/05/2020     09/05/2020    POTASSIUM 4.3 09/05/2020    CHLORIDE 107 09/05/2020    CO2 29 09/05/2020     (H) 09/05/2020    BUN 18 09/05/2020    CR 1.05 09/05/2020    MAG 2.1 09/04/2020    INR 1.04 09/01/2020     A/P:  Juvenal Blake is a 54 year old male with follicular lymphoma admitted for [facility] NK cell therapy. Today is Day +4     Day 0 - NK cells + IL-2  Day 1 rest day  Day 2 NK cells + IL-2  Day 3 rest day (no tissue bx or bmbx needed)  Day 4 = IL-2, can be given early in the day then patient can be discharged 4 hours after dose has been given  Day 10 = rituxan     1.  BMT/Cellular therapy: NAM NK for  follicular lymphoma  - Allopurinol through day 7  - Flush and pre-meds prior to cell infusion  - GCSF - ok to start after D+14 if ANC < 500; cont until ANC > 1500 x 2 days  - D+28 PET scan     2.  HEME: Keep Hgb>7 and plts>10K. No pre-meds.                            3.  ID:   - prophy fluc, LD ACV (CMV+, HSV+, EBV+) and bactrim. Would add leva if he becomes neutropenic  - Hx hypogammaglobulinemia, has received intermittent IVIG                              4.  GI:  - Cont scheduled compazine QID for ongoing nausea after LD chemo  - Ativan and compazine prn, compazine works better for him  - Protonix for GI prophy  - Psyllium daily     5.  FEN/Renal:   - Monitor creat and lytes. Replete lytes PRN per SS.   - Monitor weight, I+O, lytes per protocol with IVF flush.     6.   - Hx prostate cancer s/p radical prostatectomy with positive margins + radiation through May 2018  - continue tolterodine    7. Derm:  Rash likely due to IL-2.      Dispo:  last dose IL-2 toay, then can discharge 4 hours afterwards as long as clinically well  - ok to remove PICC line prior to discharge    Catalina Paris      BMT Staff:  The patient was discussed on morning rounds with the nurses, mid level provider, and house staff and seen and examined by me. All labs and imaging were reviewed. I reviewed events over the last 24 hours including vitals and flow sheets. I agree with the above note and have been responsible for the care plan and interpretation of progress.    Subjective:  Reports feeling overall well, well controlled N, no abd pain, no F/C/NS, no N/V/C/D, no  complaints, no URI or other respiratory symptoms, no HA/LH/Dizziness.     Vitals reviewed, labs reviewed  PE:  NAD, Alert, oriented x3  HEENT: moist mmm, no oral ulcers  Heart: RRR  Lungs: CTAB  Abdomen: +BS, soft non tender, non distended, IL-2 injection site erythema on Left LQ  LE: no edema  Skin: no rashes    Assessment and Plan:  54 year old male with FL here for NAM  NK Day +4. Monitor for toxicities, non to date. Nausea controlled. ID prophylaxis. Patient is looking forward to leaving today, is comfortable with follow up plan and schedule.  > 30 min spent in coordination of care for discharge     Stacy Sutherland MD

## 2020-09-05 NOTE — PLAN OF CARE
/86 (BP Location: Right arm)   Pulse 71   Temp 97.7  F (36.5  C) (Axillary)   Resp 18   Wt 105.8 kg (233 lb 3.2 oz)   SpO2 99%   BMI 35.96 kg/m    PICC line is removed. Patient is A & O x 4. Patient c/o headache and received tylenol with relief. Patient c/o rectum soreness due to hemorrhoid and patient received tucks, Cory spray and critic aid and sit on warm water twice a day. Patient was on phase 1 trial of NAM NK cells and IL-2 and Day +4, he received IL-2 on (LUQ) and tolerated. Patient received premedications ( tylenol 650 mg, benadryl 25 mg) and post 4 hours of IL-2. Patient was stable and discharge instructions were explained to him and his caregiver, both verbalized understanding of the discharge. Follow up appointments and medications were explained to both patient and his caregiver. Patient is voiding and having good urine output. Patient and his caregiver were happy going home. Patient and his caregiver were taught about Covid-19 precaution and how to report it. Patient and his caregiver were educated about post transplant process and when to notify the primary care team with new symptoms.   Continue with plan of care and notify MD for status changes.     Problem: Adult Inpatient Plan of Care  Goal: Plan of Care Review  9/5/2020 1452 by Marce Collins RN  Outcome: Adequate for Discharge  Flowsheets (Taken 9/5/2020 1452)  Plan of Care Reviewed With:   patient   spouse   caregiver     Problem: Adult Inpatient Plan of Care  Goal: Patient-Specific Goal (Individualization)  Outcome: Adequate for Discharge     Problem: Adult Inpatient Plan of Care  Goal: Absence of Hospital-Acquired Illness or Injury  Outcome: Adequate for Discharge     Problem: Diarrhea (Stem Cell/Bone Marrow Transplant)  Goal: Diarrhea Symptom Control  Outcome: Adequate for Discharge     Problem: Infection Risk (Stem Cell/Bone Marrow Transplant)  Goal: Absence of Infection Signs/Symptoms  Outcome: Adequate for Discharge      Problem: Nausea and Vomiting (Stem Cell/Bone Marrow Transplant)  Goal: Nausea and Vomiting Symptom Relief  Outcome: Adequate for Discharge

## 2020-09-05 NOTE — PROGRESS NOTES
Pt discharged to: Home.  Via: Car.  Accompanied by: Wife-Nancy, who is his caregiver.  Belongings: With the patient.  Teaching: Done by RN and patient & his caregiver verbalized understanding of the teaching.   Clinic appointment: Tuesday, 09/08/20 at 09:30 am.   Report called/faxed: None needed.   Local housing: [place], [place], within 20 minutes.

## 2020-09-05 NOTE — PROGRESS NOTES
s-pt up in room -wife at bedside this afternoon until 6pn. Reviewed discharge meds..patient reports having m. Cramps / spasms in hands and side of rib cage intermitantly short periods that resolve on own... questions if part o f his treatment. Reassured It to be reported to the medical team. No other concerns at this time. Anticipated discharge.   b-pt IL-2 treatment  A-pt to received IL 2 and possible discharge post.  r-education given on meds and schedule. Wife supportive and attentive to information. Anticipate discharge 9/5 if stable s/p IL-2 injection.

## 2020-09-05 NOTE — PLAN OF CARE
BP (!) 124/97 (BP Location: Right arm)   Pulse 66   Temp 97.6  F (36.4  C) (Oral)   Resp 16   Wt 107 kg (235 lb 14.4 oz)   SpO2 96%   BMI 36.38 kg/m      Shift: 5413-7309    Slightly hypertensive but below paging parameters. OVSS on room air. C/o headache, given tylenol x1 for relief. Denies other pain/nausea/SOB/itching. No replacements needed this AM. PICC hep locked. Labs and research labs sent. Plan on pt receiving last dose of IL-2 and discharging 4 hours after he remains stable. Continue to monitor per POC.    Problem: Adult Inpatient Plan of Care  Goal: Plan of Care Review  Outcome: No Change

## 2020-09-06 LAB
BACTERIA SPEC CULT: NORMAL
Lab: NORMAL
SPECIMEN SOURCE: NORMAL

## 2020-09-08 ENCOUNTER — RECORDS - HEALTHEAST (OUTPATIENT)
Dept: ADMINISTRATIVE | Facility: OTHER | Age: 55
End: 2020-09-08

## 2020-09-08 ENCOUNTER — APPOINTMENT (OUTPATIENT)
Dept: LAB | Facility: CLINIC | Age: 55
End: 2020-09-08
Attending: PHYSICIAN ASSISTANT
Payer: COMMERCIAL

## 2020-09-08 ENCOUNTER — RESULTS ONLY (OUTPATIENT)
Dept: LAB | Age: 55
End: 2020-09-08

## 2020-09-08 ENCOUNTER — ONCOLOGY VISIT (OUTPATIENT)
Dept: TRANSPLANT | Facility: CLINIC | Age: 55
End: 2020-09-08
Attending: PHYSICIAN ASSISTANT
Payer: COMMERCIAL

## 2020-09-08 ENCOUNTER — RESEARCH ENCOUNTER (OUTPATIENT)
Dept: TRANSPLANT | Facility: CLINIC | Age: 55
End: 2020-09-08

## 2020-09-08 VITALS
SYSTOLIC BLOOD PRESSURE: 110 MMHG | RESPIRATION RATE: 18 BRPM | HEART RATE: 72 BPM | DIASTOLIC BLOOD PRESSURE: 70 MMHG | OXYGEN SATURATION: 97 % | WEIGHT: 242 LBS | BODY MASS INDEX: 37.32 KG/M2 | TEMPERATURE: 97.8 F

## 2020-09-08 DIAGNOSIS — C82.08 FOLLICULAR LYMPHOMA GRADE I, LYMPH NODES OF MULTIPLE SITES (H): Primary | ICD-10-CM

## 2020-09-08 LAB
ALBUMIN SERPL-MCNC: 3.6 G/DL (ref 3.4–5)
ALP SERPL-CCNC: 80 U/L (ref 40–150)
ALT SERPL W P-5'-P-CCNC: 45 U/L (ref 0–70)
ANION GAP SERPL CALCULATED.3IONS-SCNC: 3 MMOL/L (ref 3–14)
AST SERPL W P-5'-P-CCNC: 16 U/L (ref 0–45)
BASOPHILS # BLD AUTO: 0 10E9/L (ref 0–0.2)
BASOPHILS NFR BLD AUTO: 0 %
BILIRUB SERPL-MCNC: 0.4 MG/DL (ref 0.2–1.3)
BUN SERPL-MCNC: 18 MG/DL (ref 7–30)
CALCIUM SERPL-MCNC: 8.8 MG/DL (ref 8.5–10.1)
CHLORIDE SERPL-SCNC: 110 MMOL/L (ref 94–109)
CO2 SERPL-SCNC: 26 MMOL/L (ref 20–32)
CREAT SERPL-MCNC: 1.27 MG/DL (ref 0.66–1.25)
CRP SERPL-MCNC: 15.9 MG/L (ref 0–8)
DIFFERENTIAL METHOD BLD: ABNORMAL
EOSINOPHIL # BLD AUTO: 0.1 10E9/L (ref 0–0.7)
EOSINOPHIL NFR BLD AUTO: 4.2 %
ERYTHROCYTE [DISTWIDTH] IN BLOOD BY AUTOMATED COUNT: 13.9 % (ref 10–15)
FERRITIN SERPL-MCNC: 107 NG/ML (ref 26–388)
GFR SERPL CREATININE-BSD FRML MDRD: 62 ML/MIN/{1.73_M2}
GLUCOSE SERPL-MCNC: 100 MG/DL (ref 70–99)
HCT VFR BLD AUTO: 29.5 % (ref 40–53)
HGB BLD-MCNC: 9.8 G/DL (ref 13.3–17.7)
IMM GRANULOCYTES # BLD: 0 10E9/L (ref 0–0.4)
IMM GRANULOCYTES NFR BLD: 0 %
LYMPHOCYTES # BLD AUTO: 0.4 10E9/L (ref 0.8–5.3)
LYMPHOCYTES NFR BLD AUTO: 16.1 %
MCH RBC QN AUTO: 30 PG (ref 26.5–33)
MCHC RBC AUTO-ENTMCNC: 33.2 G/DL (ref 31.5–36.5)
MCV RBC AUTO: 90 FL (ref 78–100)
MONOCYTES # BLD AUTO: 0.3 10E9/L (ref 0–1.3)
MONOCYTES NFR BLD AUTO: 13.6 %
NEUTROPHILS # BLD AUTO: 1.6 10E9/L (ref 1.6–8.3)
NEUTROPHILS NFR BLD AUTO: 66.1 %
NRBC # BLD AUTO: 0 10*3/UL
NRBC BLD AUTO-RTO: 0 /100
PLATELET # BLD AUTO: 143 10E9/L (ref 150–450)
POTASSIUM SERPL-SCNC: 4.4 MMOL/L (ref 3.4–5.3)
PROT SERPL-MCNC: 6.2 G/DL (ref 6.8–8.8)
RBC # BLD AUTO: 3.27 10E12/L (ref 4.4–5.9)
SODIUM SERPL-SCNC: 139 MMOL/L (ref 133–144)
WBC # BLD AUTO: 2.4 10E9/L (ref 4–11)

## 2020-09-08 PROCEDURE — 85025 COMPLETE CBC W/AUTO DIFF WBC: CPT

## 2020-09-08 PROCEDURE — 36415 COLL VENOUS BLD VENIPUNCTURE: CPT

## 2020-09-08 PROCEDURE — G0463 HOSPITAL OUTPT CLINIC VISIT: HCPCS | Mod: ZF

## 2020-09-08 PROCEDURE — 80053 COMPREHEN METABOLIC PANEL: CPT | Performed by: PHYSICIAN ASSISTANT

## 2020-09-08 PROCEDURE — 80053 COMPREHEN METABOLIC PANEL: CPT

## 2020-09-08 PROCEDURE — 86140 C-REACTIVE PROTEIN: CPT

## 2020-09-08 PROCEDURE — 40000937 ZZHCL STATISTIC RESEARCH KIT COLLECTION

## 2020-09-08 PROCEDURE — 82728 ASSAY OF FERRITIN: CPT

## 2020-09-08 ASSESSMENT — PAIN SCALES - GENERAL: PAINLEVEL: MILD PAIN (2)

## 2020-09-08 NOTE — LETTER
9/8/2020     RE: Juvenal Blake  [facility] [place] [address]    Dear Colleague,    Thank you for referring your patient, Juvenal Blake, to the [facility]. Please see a copy of my visit note below.    BMT Daily Progress Note     ID: Juvenal Blake is a 54 year old male with follicular lymphoma admitted s/p [facility] NK cell therapy. Today is Day+7     HPI: Feeling good.  No new complaints.  Has stable SAMS.  No new LAD. Eating with no N/V/D.  No pain or bleeding.  Erythematous over abdomen where injections were give.  No fever.        ROS: negative except as stated above in HPI     Physical Exam  Blood pressure 110/70, pulse 72, temperature 97.8  F (36.6  C), resp. rate 18, weight 109.8 kg (242 lb), SpO2 97 %.    General: NAD   Eyes: JOE, sclera anicteric   Nose/Mouth/Throat: OP clear, buccal mucosa moist, no ulcerations   Lungs: CTA bilaterally  Cardiovascular: RRR, no M/R/G   Abdominal/Rectal: +BS, soft, NT, ND  Lymphatics: No edema  Skin: mild erythema over R Lower and L med, lower abd at prior injection sites.  Neuro: A&O   Access: interval removal of PICC     Acute GVHD Score:  0     Lab  Lab Results   Component Value Date    WBC 2.4 (L) 09/05/2020    ANEU 1.9 09/05/2020    HGB 10.9 (L) 09/05/2020    HCT 32.8 (L) 09/05/2020     (L) 09/05/2020     09/05/2020    POTASSIUM 4.3 09/05/2020    CHLORIDE 107 09/05/2020    CO2 29 09/05/2020     (H) 09/05/2020    BUN 18 09/05/2020    CR 1.05 09/05/2020    MAG 2.1 09/04/2020    INR 1.04 09/01/2020        A/P:  Juvenal Blake is a 54 year old male with follicular lymphoma admitted for [facility] NK cell therapy. Today is Day +7     Day 0 - NK cells + IL-2  Day 1 rest day  Day 2 NK cells + IL-2  Day 3 rest day (no tissue bx or bmbx needed)  Day 4 = IL-2, can be given early in the day then patient can be discharged 4 hours after dose has been given  Day 10 = rituxan     1.  BMT/Cellular therapy: NAM NK for follicular  lymphoma  - Allopurinol through day 7--discontinue today  - Flush and pre-meds prior to cell infusion  - GCSF - ok to start after D+14 if ANC < 500; cont until ANC > 1500 x 2 days.    - D+28 PET scan     2.  HEME: Keep Hgb>7 and plts>10K. No pre-meds.                            3.  ID:   - prophy fluc, LD ACV (CMV+, HSV+, EBV+) and bactrim. Would add leva if he becomes neutropenic  - Hx hypogammaglobulinemia, has received intermittent IVIG                              4.  GI:  - Cont scheduled compazine QID for ongoing nausea after LD chemo  - Ativan and compazine prn, compazine works better for him  - Protonix for GI prophy  - Psyllium daily     5.  FEN/Renal:   - Monitor creat and lytes. Cr slightly elevated.  Pt will push PO fluids.     6.   - Hx prostate cancer s/p radical prostatectomy with positive margins + radiation through May 2018  - continue tolterodine     7. Derm:  Rash likely due to IL-2.       Final plan:    9/12/2020--lab, provider & infusion for rituxan  Stop allpurinol      Tasia Liu pa-c  [phone]

## 2020-09-08 NOTE — PROGRESS NOTES
BMT Daily Progress Note     ID: Juvenal Blake is a 54 year old male with follicular lymphoma admitted s/p [facility] NK cell therapy. Today is Day+7     HPI: Feeling good.  No new complaints.  Has stable SAMS.  No new LAD. Eating with no N/V/D.  No pain or bleeding.  Erythematous over abdomen where injections were give.  No fever.        ROS: negative except as stated above in HPI     Physical Exam  Blood pressure 110/70, pulse 72, temperature 97.8  F (36.6  C), resp. rate 18, weight 109.8 kg (242 lb), SpO2 97 %.    General: NAD   Eyes: JOE, sclera anicteric   Nose/Mouth/Throat: OP clear, buccal mucosa moist, no ulcerations   Lungs: CTA bilaterally  Cardiovascular: RRR, no M/R/G   Abdominal/Rectal: +BS, soft, NT, ND  Lymphatics: No edema  Skin: mild erythema over R Lower and L med, lower abd at prior injection sites.  Neuro: A&O   Access: interval removal of PICC     Acute GVHD Score:  0     Lab  Lab Results   Component Value Date    WBC 2.4 (L) 09/05/2020    ANEU 1.9 09/05/2020    HGB 10.9 (L) 09/05/2020    HCT 32.8 (L) 09/05/2020     (L) 09/05/2020     09/05/2020    POTASSIUM 4.3 09/05/2020    CHLORIDE 107 09/05/2020    CO2 29 09/05/2020     (H) 09/05/2020    BUN 18 09/05/2020    CR 1.05 09/05/2020    MAG 2.1 09/04/2020    INR 1.04 09/01/2020        A/P:  Juvenal Blake is a 54 year old male with follicular lymphoma admitted for [facility] NK cell therapy. Today is Day +7     Day 0 - NK cells + IL-2  Day 1 rest day  Day 2 NK cells + IL-2  Day 3 rest day (no tissue bx or bmbx needed)  Day 4 = IL-2, can be given early in the day then patient can be discharged 4 hours after dose has been given  Day 10 = rituxan     1.  BMT/Cellular therapy: NAM NK for follicular lymphoma  - Allopurinol through day 7--discontinue today  - Flush and pre-meds prior to cell infusion  - GCSF - ok to start after D+14 if ANC < 500; cont until ANC > 1500 x 2 days.    - D+28 PET scan     2.  HEME: Keep Hgb>7 and plts>10K. No  pre-meds.                            3.  ID:   - prophy fluc, LD ACV (CMV+, HSV+, EBV+) and bactrim. Would add leva if he becomes neutropenic  - Hx hypogammaglobulinemia, has received intermittent IVIG                              4.  GI:  - Cont scheduled compazine QID for ongoing nausea after LD chemo  - Ativan and compazine prn, compazine works better for him  - Protonix for GI prophy  - Psyllium daily     5.  FEN/Renal:   - Monitor creat and lytes. Cr slightly elevated.  Pt will push PO fluids.     6.   - Hx prostate cancer s/p radical prostatectomy with positive margins + radiation through May 2018  - continue tolterodine     7. Derm:  Rash likely due to IL-2.       Final plan:    9/12/2020--lab, provider & infusion for rituxan  Stop allpurinol      Tasia Liu pa-c  [phone]

## 2020-09-08 NOTE — NURSING NOTE
"Oncology Rooming Note    September 8, 2020 10:23 AM   Juvenal Blake is a 54 year old male who presents for:    Chief Complaint   Patient presents with     Lab Only     venipuncture vitals checked     RECHECK     here for provider visit HX: Follicular lymphoma     Initial Vitals: /70   Pulse 72   Temp 97.8  F (36.6  C)   Resp 18   Wt 109.8 kg (242 lb)   SpO2 97%   BMI 37.32 kg/m   Estimated body mass index is 37.32 kg/m  as calculated from the following:    Height as of 8/29/20: 1.715 m (5' 7.52\").    Weight as of this encounter: 109.8 kg (242 lb). Body surface area is 2.29 meters squared.  Mild Pain (2) Comment: Data Unavailable   No LMP for male patient.  Allergies reviewed: Yes  Medications reviewed: Yes    Medications: Medication refills not needed today.  Pharmacy name entered into [employer]: [id number] - [place], [place] - [address] AT [facility]    Clinical concerns: Pt denies current complaints or nausea at this visit.  Pt requesting to discuss paperwork with , will page.       Itzel Zavaleta RN              "

## 2020-09-08 NOTE — NURSING NOTE
Chief Complaint   Patient presents with     Lab Only     venipuncture vitals checked     Wanda Rose RN on 9/8/2020 at 10:18 AM

## 2020-09-09 ENCOUNTER — TELEPHONE (OUTPATIENT)
Dept: CARE COORDINATION | Facility: CLINIC | Age: 55
End: 2020-09-09

## 2020-09-09 LAB
COPATH REPORT: NORMAL
COPATH REPORT: NORMAL

## 2020-09-09 NOTE — TELEPHONE ENCOUNTER
BMT CLINICAL SOCIAL WORK NOTE:    Focus: Resources    Data: Pt is a 53 y/o male s/p NAM/NK infusion, day +8 , for a history of Follicular lymphoma    Interventions: Clinical  (CSW) spoke with the pt's wife Nancy regarding ryan applications. Nancy and the pt are working to fill these out and will drop them off at the clinic on Friday 9/11/20.  CSW encouraged Pt to contact CSW for support, questions and/or resources.     Assessment: Nancy notes that they are both doing well.     Plan: CSW will continue to work with Pt and family to provide supportive counseling and assist with resources as needed. CSW will continue to collaborate with multidisciplinary team regarding Pt's plan of care.     CITLALLI Rosario, [facility]  Pager: [phone]  Phone: [phone]

## 2020-09-10 DIAGNOSIS — C82.08 FOLLICULAR LYMPHOMA GRADE I, LYMPH NODES OF MULTIPLE SITES (H): Primary | ICD-10-CM

## 2020-09-11 ENCOUNTER — ONCOLOGY VISIT (OUTPATIENT)
Dept: TRANSPLANT | Facility: CLINIC | Age: 55
End: 2020-09-11
Attending: PHYSICIAN ASSISTANT
Payer: COMMERCIAL

## 2020-09-11 ENCOUNTER — RESEARCH ENCOUNTER (OUTPATIENT)
Dept: TRANSPLANT | Facility: CLINIC | Age: 55
End: 2020-09-11

## 2020-09-11 ENCOUNTER — APPOINTMENT (OUTPATIENT)
Dept: LAB | Facility: CLINIC | Age: 55
End: 2020-09-11
Attending: INTERNAL MEDICINE
Payer: COMMERCIAL

## 2020-09-11 ENCOUNTER — INFUSION THERAPY VISIT (OUTPATIENT)
Dept: TRANSPLANT | Facility: CLINIC | Age: 55
End: 2020-09-11
Attending: INTERNAL MEDICINE
Payer: COMMERCIAL

## 2020-09-11 VITALS
OXYGEN SATURATION: 98 % | HEART RATE: 65 BPM | RESPIRATION RATE: 20 BRPM | SYSTOLIC BLOOD PRESSURE: 116 MMHG | WEIGHT: 243.3 LBS | BODY MASS INDEX: 37.52 KG/M2 | DIASTOLIC BLOOD PRESSURE: 75 MMHG | TEMPERATURE: 97.7 F

## 2020-09-11 DIAGNOSIS — C82.08 FOLLICULAR LYMPHOMA GRADE I, LYMPH NODES OF MULTIPLE SITES (H): Primary | ICD-10-CM

## 2020-09-11 LAB
ALBUMIN SERPL-MCNC: 3.6 G/DL (ref 3.4–5)
ALP SERPL-CCNC: 74 U/L (ref 40–150)
ALT SERPL W P-5'-P-CCNC: 40 U/L (ref 0–70)
ANION GAP SERPL CALCULATED.3IONS-SCNC: 6 MMOL/L (ref 3–14)
AST SERPL W P-5'-P-CCNC: 16 U/L (ref 0–45)
BASOPHILS # BLD AUTO: 0 10E9/L (ref 0–0.2)
BASOPHILS NFR BLD AUTO: 0.4 %
BILIRUB SERPL-MCNC: 0.6 MG/DL (ref 0.2–1.3)
BUN SERPL-MCNC: 17 MG/DL (ref 7–30)
CALCIUM SERPL-MCNC: 8.9 MG/DL (ref 8.5–10.1)
CHLORIDE SERPL-SCNC: 107 MMOL/L (ref 94–109)
CO2 SERPL-SCNC: 28 MMOL/L (ref 20–32)
CREAT SERPL-MCNC: 1.1 MG/DL (ref 0.66–1.25)
DIFFERENTIAL METHOD BLD: ABNORMAL
EOSINOPHIL # BLD AUTO: 0.1 10E9/L (ref 0–0.7)
EOSINOPHIL NFR BLD AUTO: 3.7 %
ERYTHROCYTE [DISTWIDTH] IN BLOOD BY AUTOMATED COUNT: 14.7 % (ref 10–15)
GFR SERPL CREATININE-BSD FRML MDRD: 74 ML/MIN/{1.73_M2}
GLUCOSE SERPL-MCNC: 102 MG/DL (ref 70–99)
HCT VFR BLD AUTO: 31.1 % (ref 40–53)
HGB BLD-MCNC: 10.4 G/DL (ref 13.3–17.7)
IMM GRANULOCYTES # BLD: 0 10E9/L (ref 0–0.4)
IMM GRANULOCYTES NFR BLD: 0.4 %
LYMPHOCYTES # BLD AUTO: 0.3 10E9/L (ref 0.8–5.3)
LYMPHOCYTES NFR BLD AUTO: 13.2 %
MCH RBC QN AUTO: 30.6 PG (ref 26.5–33)
MCHC RBC AUTO-ENTMCNC: 33.4 G/DL (ref 31.5–36.5)
MCV RBC AUTO: 92 FL (ref 78–100)
MONOCYTES # BLD AUTO: 0.3 10E9/L (ref 0–1.3)
MONOCYTES NFR BLD AUTO: 13.6 %
NEUTROPHILS # BLD AUTO: 1.7 10E9/L (ref 1.6–8.3)
NEUTROPHILS NFR BLD AUTO: 68.7 %
NRBC # BLD AUTO: 0 10*3/UL
NRBC BLD AUTO-RTO: 0 /100
PLATELET # BLD AUTO: 169 10E9/L (ref 150–450)
POTASSIUM SERPL-SCNC: 4.3 MMOL/L (ref 3.4–5.3)
PROT SERPL-MCNC: 6.3 G/DL (ref 6.8–8.8)
RBC # BLD AUTO: 3.4 10E12/L (ref 4.4–5.9)
SODIUM SERPL-SCNC: 141 MMOL/L (ref 133–144)
WBC # BLD AUTO: 2.4 10E9/L (ref 4–11)

## 2020-09-11 PROCEDURE — 25000128 H RX IP 250 OP 636: Mod: ZF

## 2020-09-11 PROCEDURE — 96415 CHEMO IV INFUSION ADDL HR: CPT

## 2020-09-11 PROCEDURE — 80053 COMPREHEN METABOLIC PANEL: CPT | Performed by: PHYSICIAN ASSISTANT

## 2020-09-11 PROCEDURE — 25000132 ZZH RX MED GY IP 250 OP 250 PS 637: Mod: ZF

## 2020-09-11 PROCEDURE — 25800030 ZZH RX IP 258 OP 636: Mod: ZF

## 2020-09-11 PROCEDURE — 85025 COMPLETE CBC W/AUTO DIFF WBC: CPT | Performed by: PHYSICIAN ASSISTANT

## 2020-09-11 PROCEDURE — G0463 HOSPITAL OUTPT CLINIC VISIT: HCPCS | Mod: 25

## 2020-09-11 PROCEDURE — 96413 CHEMO IV INFUSION 1 HR: CPT

## 2020-09-11 PROCEDURE — 25000128 H RX IP 250 OP 636: Mod: ZF | Performed by: INTERNAL MEDICINE

## 2020-09-11 RX ORDER — EPINEPHRINE 1 MG/ML
0.3 INJECTION, SOLUTION INTRAMUSCULAR; SUBCUTANEOUS EVERY 5 MIN PRN
Status: CANCELLED | OUTPATIENT
Start: 2020-09-11

## 2020-09-11 RX ORDER — DIPHENHYDRAMINE HCL 25 MG
50 CAPSULE ORAL ONCE
Status: COMPLETED | OUTPATIENT
Start: 2020-09-11 | End: 2020-09-11

## 2020-09-11 RX ORDER — METHYLPREDNISOLONE SODIUM SUCCINATE 125 MG/2ML
125 INJECTION, POWDER, LYOPHILIZED, FOR SOLUTION INTRAMUSCULAR; INTRAVENOUS
Status: CANCELLED
Start: 2020-09-11

## 2020-09-11 RX ORDER — DIPHENHYDRAMINE HCL 25 MG
50 CAPSULE ORAL ONCE
Status: CANCELLED
Start: 2020-09-11

## 2020-09-11 RX ORDER — ALBUTEROL SULFATE 0.83 MG/ML
2.5 SOLUTION RESPIRATORY (INHALATION) ONCE
Status: CANCELLED
Start: 2020-09-29

## 2020-09-11 RX ORDER — HEPARIN SODIUM (PORCINE) LOCK FLUSH IV SOLN 100 UNIT/ML 100 UNIT/ML
5 SOLUTION INTRAVENOUS EVERY 8 HOURS
Status: DISCONTINUED | OUTPATIENT
Start: 2020-09-11 | End: 2020-09-11 | Stop reason: HOSPADM

## 2020-09-11 RX ORDER — HEPARIN SODIUM,PORCINE 10 UNIT/ML
5 VIAL (ML) INTRAVENOUS
Status: DISCONTINUED | OUTPATIENT
Start: 2020-09-11 | End: 2020-09-11 | Stop reason: HOSPADM

## 2020-09-11 RX ORDER — DIPHENHYDRAMINE HYDROCHLORIDE 50 MG/ML
50 INJECTION INTRAMUSCULAR; INTRAVENOUS
Status: CANCELLED
Start: 2020-09-11

## 2020-09-11 RX ORDER — ACETAMINOPHEN 325 MG/1
650 TABLET ORAL ONCE
Status: CANCELLED
Start: 2020-09-11

## 2020-09-11 RX ORDER — ACETAMINOPHEN 325 MG/1
650 TABLET ORAL ONCE
Status: COMPLETED | OUTPATIENT
Start: 2020-09-11 | End: 2020-09-11

## 2020-09-11 RX ORDER — SODIUM CHLORIDE 9 MG/ML
1000 INJECTION, SOLUTION INTRAVENOUS CONTINUOUS PRN
Status: CANCELLED
Start: 2020-09-11

## 2020-09-11 RX ORDER — NALOXONE HYDROCHLORIDE 0.4 MG/ML
.1-.4 INJECTION, SOLUTION INTRAMUSCULAR; INTRAVENOUS; SUBCUTANEOUS
Status: CANCELLED | OUTPATIENT
Start: 2020-09-11

## 2020-09-11 RX ORDER — ALBUTEROL SULFATE 0.83 MG/ML
2.5 SOLUTION RESPIRATORY (INHALATION)
Status: CANCELLED | OUTPATIENT
Start: 2020-09-11

## 2020-09-11 RX ORDER — ALBUTEROL SULFATE 90 UG/1
1-2 AEROSOL, METERED RESPIRATORY (INHALATION)
Status: CANCELLED
Start: 2020-09-11

## 2020-09-11 RX ORDER — MEPERIDINE HYDROCHLORIDE 25 MG/ML
25 INJECTION INTRAMUSCULAR; INTRAVENOUS; SUBCUTANEOUS EVERY 30 MIN PRN
Status: CANCELLED | OUTPATIENT
Start: 2020-09-11

## 2020-09-11 RX ORDER — LORAZEPAM 2 MG/ML
0.5 INJECTION INTRAMUSCULAR EVERY 4 HOURS PRN
Status: CANCELLED
Start: 2020-09-11

## 2020-09-11 RX ADMIN — RITUXIMAB 900 MG: 10 INJECTION, SOLUTION INTRAVENOUS at 11:31

## 2020-09-11 RX ADMIN — DIPHENHYDRAMINE HYDROCHLORIDE 50 MG: 25 CAPSULE ORAL at 10:36

## 2020-09-11 RX ADMIN — ACETAMINOPHEN 650 MG: 325 TABLET ORAL at 10:36

## 2020-09-11 RX ADMIN — Medication 5 ML: at 09:52

## 2020-09-11 ASSESSMENT — PAIN SCALES - GENERAL: PAINLEVEL: NO PAIN (0)

## 2020-09-11 NOTE — LETTER
"9/11/2020     RE: Juvenal Blake  [address] [place] [address]    Dear Colleague,    Thank you for referring your patient, Juvenal Blake, to the [facility]. Please see a copy of my visit note below.    BMT Daily Progress Note     ID: Juvenal Blake is a 54 year old male with follicular lymphoma admitted s/p NAM NK cell therapy. Today is Day+10     HPI:  Has some nausea but improved with compazine. Has stable SAMS, not worse with NK cells.   No fevers.  No new cough but sometimes he thinks  seems \"rattly\" when he takes a breath.He worries that he gets a sinus/cough thing this time of year. Lungs sound clear.  No sinus pain/congestion.  No new LAD- The LN in his right groin is much smaller but still palpable.. Eating and drinking.  No pain( including LN in right groin) or bleeding.  Erythema over abdomen where injections were has resolved.  No fevers.        ROS: negative except as stated above in HPI     Physical Exam    Vital Signs 9/11/2020   Systolic 123   Diastolic 69   Pulse 63   Temperature 98.5   Respirations 16   Weight (LB) 243 lb 4.8 oz   Height    BMI (Calculated)    Pain    O2 99     KPS=80, ECOG=1  General: NAD   Eyes: JOE, sclera anicteric   Nose/Mouth/Throat: OP clear, buccal mucosa moist, no ulcerations   Lungs: CTA bilaterally  Cardiovascular: RRR, no M/R/G   Abdominal/Rectal: +BS, soft, NT, ND  Lymphatics: No edema--right groin with hard palpable LN, nontender now-smaller per patient  Skin: mild erythema over R Lower and L med, lower abd at prior injection sites is resolved.  Neuro: A&O - CN II-XII are intact, non focal, no tremor  Access: interval removal of PICC- port a cath:  Non tender          Lab  Lab Results   Component Value Date    WBC 2.4 (L) 09/11/2020    ANEU 1.7 09/11/2020    HGB 10.4 (L) 09/11/2020    HCT 31.1 (L) 09/11/2020     09/11/2020     09/08/2020    POTASSIUM 4.4 09/08/2020    CHLORIDE 110 (H) 09/08/2020    CO2 26 " 09/08/2020     (H) 09/08/2020    BUN 18 09/08/2020    CR 1.27 (H) 09/08/2020    MAG 2.1 09/04/2020    INR 1.04 09/01/2020        A/P:  Juvenal Blake is a 54 year old male with follicular lymphoma admitted for NAM NK cell therapy. Today is Day +10     Day 0 - NK cells + IL-2  Day 1 rest day  Day 2 NK cells + IL-2  Day 3 rest day (no tissue bx or bmbx needed)  Day 4 = IL-2, can be given early in the day then patient can be discharged 4 hours after dose has been given  Day 10 = rituxan today- one day early due to weekend     1.  BMT/Cellular therapy: NAM NK for follicular lymphoma  - Allopurinol through day 7--discontinued 9/8 as complete  -   - GCSF - ok to start after D+14 if ANC < 500; cont until ANC > 1500 x 2 days.    - D+28 PET scan     2.  HEME: Keep Hgb>7 and plts>10K. No pre-meds.               - no transfusions, ANC=1.7             3.  ID:   - prophy fluc, LD ACV (CMV+, HSV+, EBV+) and bactrim. Would add leva if he becomes neutropenic  - Hx hypogammaglobulinemia, has received intermittent IVIG                              4.  GI:  - Cont scheduled compazine tid for ongoing nausea after LD chemo  - Ativan and compazine prn, compazine works better for him  - Prilosec for GI prophy  - Psyllium daily     5.  FEN/Renal:   - Monitor creat and lytes. Cr better today.  Pt will push PO fluids.     6.   - Hx prostate cancer s/p radical prostatectomy with positive margins + radiation through May 2018  - continue tolterodine     7. Derm:  Rash likely due to IL-2 is resolved       Final plan:    9/15/2020--lab, provider visit.  Lab released out of treatment plan  Call for all fevers 100.5 or more.  Says he has the numbers.      Glory Covarrubias PA-C  [phone]      Again, thank you for allowing me to participate in the care of your patient.        Sincerely,        BMT Advanced Practice Provider

## 2020-09-11 NOTE — PROGRESS NOTES
Infusion Nursing Note:  Juvenal Blake presents today for Rituxan infusion.    Patient seen by provider today: Yes: Glory Covarrubias   present during visit today: Not Applicable.    Note: Labs monitored, VSS. Premeds administered. IV rituxan administered according to titration protocol. Pt tolerated infusion without signs and symptoms of reaction.     Intravenous Access:  Implanted Port.    Treatment Conditions:  Lab Results   Component Value Date    HGB 10.4 09/11/2020     Lab Results   Component Value Date    WBC 2.4 09/11/2020      Lab Results   Component Value Date    ANEU 1.7 09/11/2020     Lab Results   Component Value Date     09/11/2020      Lab Results   Component Value Date     09/11/2020                   Lab Results   Component Value Date    POTASSIUM 4.3 09/11/2020           Lab Results   Component Value Date    MAG 2.1 09/04/2020            Lab Results   Component Value Date    CR 1.10 09/11/2020                   Lab Results   Component Value Date    TRE 8.9 09/11/2020                Lab Results   Component Value Date    BILITOTAL 0.6 09/11/2020           Lab Results   Component Value Date    ALBUMIN 3.6 09/11/2020                    Lab Results   Component Value Date    ALT 40 09/11/2020           Lab Results   Component Value Date    AST 16 09/11/2020       Results reviewed, labs MET treatment parameters, ok to proceed with treatment.      Post Infusion Assessment:  Patient tolerated infusion without incident.  Access discontinued per protocol.       Discharge Plan:   [facility]  Patient will return Tuesday for next appointment.   Patient discharged in stable condition accompanied by: self.  Departure Mode: Ambulatory.    Arik Han RN

## 2020-09-11 NOTE — PROGRESS NOTES
Chief Complaint   Patient presents with     Blood Draw     labs drawn via port. vitals taken.     Port accessed, labs drawn, heparin locked. Vital signs taken. Patient checked in to next appointments. Message sent to provider for lab orders.    Meggan Chau RN

## 2020-09-11 NOTE — PROGRESS NOTES
"BMT Daily Progress Note     ID: Juvenal Blake is a 54 year old male with follicular lymphoma admitted s/p [facility] NK cell therapy. Today is Day+10     HPI:  Has some nausea but improved with compazine. Has stable SAMS, not worse with NK cells.   No fevers.  No new cough but sometimes he thinks  seems \"rattly\" when he takes a breath.He worries that he gets a sinus/cough thing this time of year. Lungs sound clear.  No sinus pain/congestion.  No new LAD- The LN in his right groin is much smaller but still palpable.. Eating and drinking.  No pain( including LN in right groin) or bleeding.  Erythema over abdomen where injections were has resolved.  No fevers.        ROS: negative except as stated above in HPI     Physical Exam    Vital Signs 9/11/2020   Systolic 123   Diastolic 69   Pulse 63   Temperature 98.5   Respirations 16   Weight (LB) 243 lb 4.8 oz   Height    BMI (Calculated)    Pain    O2 99     KPS=80, ECOG=1  General: NAD   Eyes: JOE, sclera anicteric   Nose/Mouth/Throat: OP clear, buccal mucosa moist, no ulcerations   Lungs: CTA bilaterally  Cardiovascular: RRR, no M/R/G   Abdominal/Rectal: +BS, soft, NT, ND  Lymphatics: No edema--right groin with hard palpable LN, nontender now-smaller per patient  Skin: mild erythema over R Lower and L med, lower abd at prior injection sites is resolved.  Neuro: A&O - CN II-XII are intact, non focal, no tremor  Access: interval removal of PICC- port a cath:  Non tender          Lab  Lab Results   Component Value Date    WBC 2.4 (L) 09/11/2020    ANEU 1.7 09/11/2020    HGB 10.4 (L) 09/11/2020    HCT 31.1 (L) 09/11/2020     09/11/2020     09/08/2020    POTASSIUM 4.4 09/08/2020    CHLORIDE 110 (H) 09/08/2020    CO2 26 09/08/2020     (H) 09/08/2020    BUN 18 09/08/2020    CR 1.27 (H) 09/08/2020    MAG 2.1 09/04/2020    INR 1.04 09/01/2020        A/P:  Juvenal Blake is a 54 year old male with follicular lymphoma admitted for NAM NK cell therapy. Today is Day " +10     Day 0 - NK cells + IL-2  Day 1 rest day  Day 2 NK cells + IL-2  Day 3 rest day (no tissue bx or bmbx needed)  Day 4 = IL-2, can be given early in the day then patient can be discharged 4 hours after dose has been given  Day 10 = rituxan today- one day early due to weekend     1.  BMT/Cellular therapy: NAM NK for follicular lymphoma  - Allopurinol through day 7--discontinued 9/8 as complete  -   - GCSF - ok to start after D+14 if ANC < 500; cont until ANC > 1500 x 2 days.    - D+28 PET scan     2.  HEME: Keep Hgb>7 and plts>10K. No pre-meds.               - no transfusions, ANC=1.7             3.  ID:   - prophy fluc, LD ACV (CMV+, HSV+, EBV+) and bactrim. Would add leva if he becomes neutropenic  - Hx hypogammaglobulinemia, has received intermittent IVIG                              4.  GI:  - Cont scheduled compazine tid for ongoing nausea after LD chemo  - Ativan and compazine prn, compazine works better for him  - Prilosec for GI prophy  - Psyllium daily     5.  FEN/Renal:   - Monitor creat and lytes. Cr better today.  Pt will push PO fluids.     6.   - Hx prostate cancer s/p radical prostatectomy with positive margins + radiation through May 2018  - continue tolterodine     7. Derm:  Rash likely due to IL-2 is resolved       Final plan:    9/15/2020--lab, provider visit.  Lab released out of treatment plan  Call for all fevers 100.5 or more.  Says he has the numbers.      THOMAS Tom-C  [phone]

## 2020-09-11 NOTE — LETTER
9/11/2020         RE: Juvenal Blake  [address] [place] [address]        Dear Colleague,    Thank you for referring your patient, Juvenal Blake, to the [facility]ANSPL[facility]. Please see a copy of my visit note below.    Chief Complaint   Patient presents with     Blood Draw     labs drawn via port. vitals taken.     Port accessed, labs drawn, heparin locked. Vital signs taken. Patient checked in to next appointments. Message sent to provider for lab orders.    Meggan Chau RN      Infusion Nursing Note:  Juvenal Blake presents today for Rituxan infusion.    Patient seen by provider today: Yes: Glory Covarrubias   present during visit today: Not Applicable.    Note: Labs monitored, VSS. Premeds administered. IV rituxan administered according to titration protocol. Pt tolerated infusion without signs and symptoms of reaction.     Intravenous Access:  Implanted Port.    Treatment Conditions:  Lab Results   Component Value Date    HGB 10.4 09/11/2020     Lab Results   Component Value Date    WBC 2.4 09/11/2020      Lab Results   Component Value Date    ANEU 1.7 09/11/2020     Lab Results   Component Value Date     09/11/2020      Lab Results   Component Value Date     09/11/2020                   Lab Results   Component Value Date    POTASSIUM 4.3 09/11/2020           Lab Results   Component Value Date    MAG 2.1 09/04/2020            Lab Results   Component Value Date    CR 1.10 09/11/2020                   Lab Results   Component Value Date    TRE 8.9 09/11/2020                Lab Results   Component Value Date    BILITOTAL 0.6 09/11/2020           Lab Results   Component Value Date    ALBUMIN 3.6 09/11/2020                    Lab Results   Component Value Date    ALT 40 09/11/2020           Lab Results   Component Value Date    AST 16 09/11/2020       Results reviewed, labs MET treatment parameters, ok to proceed with treatment.      Post Infusion  Assessment:  Patient tolerated infusion without incident.  Access discontinued per protocol.       Discharge Plan:   AVS to patient via MYCHART.  Patient will return Tuesday for next appointment.   Patient discharged in stable condition accompanied by: self.  Departure Mode: Ambulatory.    Arik Han RN                          Again, thank you for allowing me to participate in the care of your patient.        Sincerely,        [facility]

## 2020-09-11 NOTE — PATIENT INSTRUCTIONS
Return as scheduled on 9/15/2020 Tuesday for labs and provider visit    Labs are in the treatment plan

## 2020-09-11 NOTE — PROGRESS NOTES
[id number]: Study Visit Note   Subject name: Juvenal Blake     Visit: D11    Did the study visit occur within the appropriate window allowed by the protocol? yes    Since the last study visit, he has been doing well. There are no new concerns today.    I have personally interviewed Juvenal Blake and reviewed his medical record for adverse events and concomitant medications and these have been recorded on the corresponding logs in Juvenal Blake's research file.     Juvenal Blake was given the opportunity to ask any trial related questions.  Please see provider progress note for physical exam and other clinical information. Labs were reviewed - any significant lab values were addressed and reviewed.    Juvenal was assessed for the following targeted toxicities:    Infusion related reaction: 0  Dyspnea: 1  Hypoxia: 0  Fever: 0  Chills: 0  Hypertension: 1  Hypotension: 0  Edema: 0  Pneumonitis: 0  Rash: 0  Estrella Solomon RN

## 2020-09-14 NOTE — PROGRESS NOTES
BMT Daily Progress Note     ID: Juvenal Blake is a 54 year old male with follicular lymphoma s/p NAM NK cell therapy. Today is Day+14     HPI:  Juvenal is here for follow up. About 4 days ago started to notice cold symptoms. Nasal congestion and dry cough. Intermittent headache. No fever. No sick contacts. No COVID contacts that he is aware of. Some fatigue, but not dramatic. He does feel dyspneic even at rest. Nausea still present, especially if he doesn't have any food in his stomach. Eating appropriate amounts. Taking scheduled compazine. No emesis. Prior loose stools improved. No longer has central line.        ROS: negative except as stated above in HPI     Physical Exam  /80 (BP Location: Right arm, Patient Position: Sitting)   Pulse 64   Temp 97.8  F (36.6  C) (Oral)   Resp 20   Wt 109.7 kg (241 lb 14.4 oz)   SpO2 100%   BMI 37.31 kg/m      KPS=80, ECOG=1  General: NAD   Eyes: JOE, sclera anicteric   Nose/Mouth/Throat: OP clear, buccal mucosa moist, no ulcerations   Lungs: CTA bilaterally. Breathing comfortably on room air. His O2 sats are 100% on room air  Cardiovascular: RRR, no M/R/G   Abdominal/Rectal: +BS, soft, NT, ND  Lymphatics: No edema--right groin with hard palpable LN, nontender now-smaller per patient  Skin: mild erythema over R Lower and L med, lower abd at prior injection sites is resolved.  Neuro: A&O - CN II-XII are intact, non focal, no tremor  Access: interval removal of PICC- port a cath:  Non tender     Lab  Lab Results   Component Value Date    WBC 1.5 (L) 09/15/2020    ANEU 0.8 (L) 09/15/2020    HGB 11.4 (L) 09/15/2020    HCT 34.3 (L) 09/15/2020     09/15/2020     09/15/2020    POTASSIUM 3.8 09/15/2020    CHLORIDE 109 09/15/2020    CO2 27 09/15/2020     (H) 09/15/2020    BUN 14 09/15/2020    CR 1.26 (H) 09/15/2020    MAG 2.1 09/04/2020    INR 1.04 09/01/2020        A/P:  Juvenal Blake is a 54 year old male with follicular lymphoma admitted for [facility] NK  cell therapy. Today is Day +14     Day 0 - NK cells + IL-2  Day 1 rest day  Day 2 NK cells + IL-2  Day 3 rest day (no tissue bx or bmbx needed)  Day 4 = IL-2, can be given early in the day then patient can be discharged 4 hours after dose has been given  Day 10 = rituxan     1.  BMT/Cellular therapy: NAM NK for follicular lymphoma  - Allopurinol through day 7--discontinued 9/8 as complete  - GCSF - ok to start after D+14. His ANC dropped to 0.8 (could be du to cold sxs vs rituxan?), give GCSF 9/15  - D+28 PET scan     2.  HEME: Keep Hgb>7 and plts>10K. No pre-meds.            3.  ID:   - nasal congestion + dry cough. Obtain symptomatic COVID test - ordered and will be contacted by central scheduling for appt  - prophy fluc, LD ACV (CMV+, HSV+, EBV+) and bactrim. His ANC is 0.8, start leva prophy 9/15  - Hx hypogammaglobulinemia, has received intermittent IVIG                              4.  GI:  - Cont scheduled compazine tid for ongoing nausea after LD chemo  - Ativan and compazine prn, compazine works better for him  - Prilosec for GI prophy  - Psyllium daily     5.  FEN/Renal:   - Monitor creat and lytes     6.   - Hx prostate cancer s/p radical prostatectomy with positive margins + radiation through May 2018  - continue tolterodine     7. Derm:  Rash likely due to IL-2 is resolved       RTC in one week for labs and provider visit per study  - advised to call if fever > 100.4  - COVID test pending    Irma Bethea PA-C  [phone]

## 2020-09-15 ENCOUNTER — ONCOLOGY VISIT (OUTPATIENT)
Dept: TRANSPLANT | Facility: CLINIC | Age: 55
End: 2020-09-15
Attending: PHYSICIAN ASSISTANT
Payer: COMMERCIAL

## 2020-09-15 ENCOUNTER — RESULTS ONLY (OUTPATIENT)
Dept: LAB | Age: 55
End: 2020-09-15

## 2020-09-15 ENCOUNTER — RESEARCH ENCOUNTER (OUTPATIENT)
Dept: TRANSPLANT | Facility: CLINIC | Age: 55
End: 2020-09-15

## 2020-09-15 ENCOUNTER — AMBULATORY - HEALTHEAST (OUTPATIENT)
Dept: FAMILY MEDICINE | Facility: CLINIC | Age: 55
End: 2020-09-15

## 2020-09-15 ENCOUNTER — APPOINTMENT (OUTPATIENT)
Dept: LAB | Facility: CLINIC | Age: 55
End: 2020-09-15
Attending: PHYSICIAN ASSISTANT
Payer: COMMERCIAL

## 2020-09-15 VITALS
HEART RATE: 64 BPM | WEIGHT: 241.9 LBS | OXYGEN SATURATION: 100 % | RESPIRATION RATE: 20 BRPM | SYSTOLIC BLOOD PRESSURE: 131 MMHG | DIASTOLIC BLOOD PRESSURE: 80 MMHG | BODY MASS INDEX: 37.31 KG/M2 | TEMPERATURE: 97.8 F

## 2020-09-15 DIAGNOSIS — C82.08 FOLLICULAR LYMPHOMA GRADE I, LYMPH NODES OF MULTIPLE SITES (H): Primary | ICD-10-CM

## 2020-09-15 DIAGNOSIS — C82.08 FOLLICULAR LYMPHOMA GRADE I, LYMPH NODES OF MULTIPLE SITES (H): ICD-10-CM

## 2020-09-15 LAB
ALBUMIN SERPL-MCNC: 3.6 G/DL (ref 3.4–5)
ALP SERPL-CCNC: 68 U/L (ref 40–150)
ALT SERPL W P-5'-P-CCNC: 51 U/L (ref 0–70)
ANION GAP SERPL CALCULATED.3IONS-SCNC: 4 MMOL/L (ref 3–14)
AST SERPL W P-5'-P-CCNC: 24 U/L (ref 0–45)
BASOPHILS # BLD AUTO: 0 10E9/L (ref 0–0.2)
BASOPHILS NFR BLD AUTO: 0 %
BILIRUB SERPL-MCNC: 0.4 MG/DL (ref 0.2–1.3)
BUN SERPL-MCNC: 14 MG/DL (ref 7–30)
CALCIUM SERPL-MCNC: 8.7 MG/DL (ref 8.5–10.1)
CHLORIDE SERPL-SCNC: 109 MMOL/L (ref 94–109)
CO2 SERPL-SCNC: 27 MMOL/L (ref 20–32)
CREAT SERPL-MCNC: 1.26 MG/DL (ref 0.66–1.25)
CRP SERPL-MCNC: 4.5 MG/L (ref 0–8)
DIFFERENTIAL METHOD BLD: ABNORMAL
EOSINOPHIL # BLD AUTO: 0.1 10E9/L (ref 0–0.7)
EOSINOPHIL NFR BLD AUTO: 8.1 %
ERYTHROCYTE [DISTWIDTH] IN BLOOD BY AUTOMATED COUNT: 15.5 % (ref 10–15)
FERRITIN SERPL-MCNC: 50 NG/ML (ref 26–388)
GFR SERPL CREATININE-BSD FRML MDRD: 63 ML/MIN/{1.73_M2}
GLUCOSE SERPL-MCNC: 129 MG/DL (ref 70–99)
HCT VFR BLD AUTO: 34.3 % (ref 40–53)
HGB BLD-MCNC: 11.4 G/DL (ref 13.3–17.7)
IMM GRANULOCYTES # BLD: 0 10E9/L (ref 0–0.4)
IMM GRANULOCYTES NFR BLD: 0.7 %
LYMPHOCYTES # BLD AUTO: 0.2 10E9/L (ref 0.8–5.3)
LYMPHOCYTES NFR BLD AUTO: 14.8 %
MCH RBC QN AUTO: 31 PG (ref 26.5–33)
MCHC RBC AUTO-ENTMCNC: 33.2 G/DL (ref 31.5–36.5)
MCV RBC AUTO: 93 FL (ref 78–100)
MONOCYTES # BLD AUTO: 0.3 10E9/L (ref 0–1.3)
MONOCYTES NFR BLD AUTO: 22.8 %
NEUTROPHILS # BLD AUTO: 0.8 10E9/L (ref 1.6–8.3)
NEUTROPHILS NFR BLD AUTO: 53.6 %
NRBC # BLD AUTO: 0 10*3/UL
NRBC BLD AUTO-RTO: 1 /100
PLATELET # BLD AUTO: 159 10E9/L (ref 150–450)
PLATELET # BLD EST: ABNORMAL 10*3/UL
POTASSIUM SERPL-SCNC: 3.8 MMOL/L (ref 3.4–5.3)
PROT SERPL-MCNC: 6.3 G/DL (ref 6.8–8.8)
RBC # BLD AUTO: 3.68 10E12/L (ref 4.4–5.9)
SODIUM SERPL-SCNC: 141 MMOL/L (ref 133–144)
WBC # BLD AUTO: 1.5 10E9/L (ref 4–11)

## 2020-09-15 PROCEDURE — 96372 THER/PROPH/DIAG INJ SC/IM: CPT

## 2020-09-15 PROCEDURE — 86140 C-REACTIVE PROTEIN: CPT

## 2020-09-15 PROCEDURE — 82728 ASSAY OF FERRITIN: CPT

## 2020-09-15 PROCEDURE — 85025 COMPLETE CBC W/AUTO DIFF WBC: CPT

## 2020-09-15 PROCEDURE — 80053 COMPREHEN METABOLIC PANEL: CPT

## 2020-09-15 PROCEDURE — 40000937 ZZHCL STATISTIC RESEARCH KIT COLLECTION

## 2020-09-15 PROCEDURE — 25000128 H RX IP 250 OP 636: Mod: ZF | Performed by: PHYSICIAN ASSISTANT

## 2020-09-15 PROCEDURE — G0463 HOSPITAL OUTPT CLINIC VISIT: HCPCS | Mod: ZF

## 2020-09-15 RX ORDER — HEPARIN SODIUM (PORCINE) LOCK FLUSH IV SOLN 100 UNIT/ML 100 UNIT/ML
5 SOLUTION INTRAVENOUS
Status: CANCELLED | OUTPATIENT
Start: 2020-09-15

## 2020-09-15 RX ORDER — HEPARIN SODIUM,PORCINE 10 UNIT/ML
5 VIAL (ML) INTRAVENOUS
Status: CANCELLED | OUTPATIENT
Start: 2020-09-15

## 2020-09-15 RX ORDER — HEPARIN SODIUM (PORCINE) LOCK FLUSH IV SOLN 100 UNIT/ML 100 UNIT/ML
5 SOLUTION INTRAVENOUS EVERY 8 HOURS
Status: DISCONTINUED | OUTPATIENT
Start: 2020-09-15 | End: 2020-09-15 | Stop reason: HOSPADM

## 2020-09-15 RX ORDER — LEVOFLOXACIN 250 MG/1
250 TABLET, FILM COATED ORAL DAILY
Qty: 30 TABLET | Refills: 0 | Status: ON HOLD | OUTPATIENT
Start: 2020-09-15 | End: 2020-10-05

## 2020-09-15 RX ADMIN — HEPARIN SODIUM (PORCINE) LOCK FLUSH IV SOLN 100 UNIT/ML 5 ML: 100 SOLUTION at 09:59

## 2020-09-15 RX ADMIN — FILGRASTIM-SNDZ 480 MCG: 480 INJECTION, SOLUTION INTRAVENOUS; SUBCUTANEOUS at 11:10

## 2020-09-15 ASSESSMENT — PAIN SCALES - GENERAL: PAINLEVEL: NO PAIN (0)

## 2020-09-15 NOTE — LETTER
9/15/2020     RE: Juvenal Blake  [address] [place] [address]    Dear Colleague,    Thank you for referring your patient, Juvenal Blake, to the [facility] BLOOD AND MARROW TRANSPLANT. Please see a copy of my visit note below.    BMT Daily Progress Note     ID: Juvenal Blake is a 54 year old male with follicular lymphoma s/p NAM NK cell therapy. Today is Day+14     HPI:  Juvenal is here for follow up. About 4 days ago started to notice cold symptoms. Nasal congestion and dry cough. Intermittent headache. No fever. No sick contacts. No COVID contacts that he is aware of. Some fatigue, but not dramatic. He does feel dyspneic even at rest. Nausea still present, especially if he doesn't have any food in his stomach. Eating appropriate amounts. Taking scheduled compazine. No emesis. Prior loose stools improved. No longer has central line.        ROS: negative except as stated above in HPI     Physical Exam  /80 (BP Location: Right arm, Patient Position: Sitting)   Pulse 64   Temp 97.8  F (36.6  C) (Oral)   Resp 20   Wt 109.7 kg (241 lb 14.4 oz)   SpO2 100%   BMI 37.31 kg/m      KPS=80, ECOG=1  General: NAD   Eyes: JOE, sclera anicteric   Nose/Mouth/Throat: OP clear, buccal mucosa moist, no ulcerations   Lungs: CTA bilaterally. Breathing comfortably on room air. His O2 sats are 100% on room air  Cardiovascular: RRR, no M/R/G   Abdominal/Rectal: +BS, soft, NT, ND  Lymphatics: No edema--right groin with hard palpable LN, nontender now-smaller per patient  Skin: mild erythema over R Lower and L med, lower abd at prior injection sites is resolved.  Neuro: A&O - CN II-XII are intact, non focal, no tremor  Access: interval removal of PICC- port a cath:  Non tender     Lab  Lab Results   Component Value Date    WBC 1.5 (L) 09/15/2020    ANEU 0.8 (L) 09/15/2020    HGB 11.4 (L) 09/15/2020    HCT 34.3 (L) 09/15/2020     09/15/2020     09/15/2020    POTASSIUM 3.8 09/15/2020    CHLORIDE 109  09/15/2020    CO2 27 09/15/2020     (H) 09/15/2020    BUN 14 09/15/2020    CR 1.26 (H) 09/15/2020    MAG 2.1 09/04/2020    INR 1.04 09/01/2020        A/P:  Juvenal Blake is a 54 year old male with follicular lymphoma admitted for NAM NK cell therapy. Today is Day +14     Day 0 - NK cells + IL-2  Day 1 rest day  Day 2 NK cells + IL-2  Day 3 rest day (no tissue bx or bmbx needed)  Day 4 = IL-2, can be given early in the day then patient can be discharged 4 hours after dose has been given  Day 10 = rituxan     1.  BMT/Cellular therapy: NAM NK for follicular lymphoma  - Allopurinol through day 7--discontinued 9/8 as complete  - GCSF - ok to start after D+14. His ANC dropped to 0.8 (could be du to cold sxs vs rituxan?), give GCSF 9/15  - D+28 PET scan     2.  HEME: Keep Hgb>7 and plts>10K. No pre-meds.            3.  ID:   - nasal congestion + dry cough. Obtain symptomatic COVID test - ordered and will be contacted by central scheduling for appt  - prophy fluc, LD ACV (CMV+, HSV+, EBV+) and bactrim. His ANC is 0.8, start leva prophy 9/15  - Hx hypogammaglobulinemia, has received intermittent IVIG                              4.  GI:  - Cont scheduled compazine tid for ongoing nausea after LD chemo  - Ativan and compazine prn, compazine works better for him  - Prilosec for GI prophy  - Psyllium daily     5.  FEN/Renal:   - Monitor creat and lytes     6.   - Hx prostate cancer s/p radical prostatectomy with positive margins + radiation through May 2018  - continue tolterodine     7. Derm:  Rash likely due to IL-2 is resolved       RTC in one week for labs and provider visit per study  - advised to call if fever > 100.4  - COVID test pending    Irma Bethea PA-C  [phone]        Again, thank you for allowing me to participate in the care of your patient.        Sincerely,        BMT Advanced Practice Provider

## 2020-09-15 NOTE — NURSING NOTE
"Oncology Rooming Note    September 15, 2020 10:05 AM   Juvenal Blake is a 54 year old male who presents for:    Chief Complaint   Patient presents with     Port Draw     Port labs collected by RN.      Oncology Clinic Visit     Return; Follicular Lymphoma     Initial Vitals: /80 (BP Location: Right arm, Patient Position: Sitting)   Pulse 64   Temp 97.8  F (36.6  C) (Oral)   Resp 20   Wt 109.7 kg (241 lb 14.4 oz)   SpO2 100%   BMI 37.31 kg/m   Estimated body mass index is 37.31 kg/m  as calculated from the following:    Height as of 8/29/20: 1.715 m (5' 7.52\").    Weight as of this encounter: 109.7 kg (241 lb 14.4 oz). Body surface area is 2.29 meters squared.  No Pain (0) Comment: Data Unavailable   No LMP for male patient.  Allergies reviewed: Yes  Medications reviewed: Yes    Medications: Medication refills not needed today.  Pharmacy name entered into [employer]: [id number] - [place], [place] - [address] AT [facility]    Clinical concerns: No new concerns.        Carmela Montano CMA              "

## 2020-09-17 ENCOUNTER — COMMUNICATION - HEALTHEAST (OUTPATIENT)
Dept: SCHEDULING | Facility: CLINIC | Age: 55
End: 2020-09-17

## 2020-09-18 LAB — COPATH REPORT: NORMAL

## 2020-09-22 ENCOUNTER — RESULTS ONLY (OUTPATIENT)
Dept: LAB | Age: 55
End: 2020-09-22

## 2020-09-22 ENCOUNTER — ONCOLOGY VISIT (OUTPATIENT)
Dept: TRANSPLANT | Facility: CLINIC | Age: 55
End: 2020-09-22
Attending: PHYSICIAN ASSISTANT
Payer: COMMERCIAL

## 2020-09-22 ENCOUNTER — RESEARCH ENCOUNTER (OUTPATIENT)
Dept: TRANSPLANT | Facility: CLINIC | Age: 55
End: 2020-09-22

## 2020-09-22 ENCOUNTER — APPOINTMENT (OUTPATIENT)
Dept: LAB | Facility: CLINIC | Age: 55
End: 2020-09-22
Attending: PHYSICIAN ASSISTANT
Payer: COMMERCIAL

## 2020-09-22 VITALS
DIASTOLIC BLOOD PRESSURE: 80 MMHG | WEIGHT: 245.3 LBS | RESPIRATION RATE: 16 BRPM | HEART RATE: 68 BPM | OXYGEN SATURATION: 99 % | SYSTOLIC BLOOD PRESSURE: 126 MMHG | BODY MASS INDEX: 37.83 KG/M2 | TEMPERATURE: 98.3 F

## 2020-09-22 DIAGNOSIS — C82.08 FOLLICULAR LYMPHOMA GRADE I, LYMPH NODES OF MULTIPLE SITES (H): Primary | ICD-10-CM

## 2020-09-22 LAB
ALBUMIN SERPL-MCNC: 3.4 G/DL (ref 3.4–5)
ALP SERPL-CCNC: 80 U/L (ref 40–150)
ALT SERPL W P-5'-P-CCNC: 37 U/L (ref 0–70)
ANION GAP SERPL CALCULATED.3IONS-SCNC: 6 MMOL/L (ref 3–14)
AST SERPL W P-5'-P-CCNC: 19 U/L (ref 0–45)
BASOPHILS # BLD AUTO: 0 10E9/L (ref 0–0.2)
BASOPHILS NFR BLD AUTO: 0.5 %
BILIRUB SERPL-MCNC: 0.4 MG/DL (ref 0.2–1.3)
BUN SERPL-MCNC: 14 MG/DL (ref 7–30)
CALCIUM SERPL-MCNC: 8.7 MG/DL (ref 8.5–10.1)
CHLORIDE SERPL-SCNC: 105 MMOL/L (ref 94–109)
CO2 SERPL-SCNC: 27 MMOL/L (ref 20–32)
CREAT SERPL-MCNC: 1.26 MG/DL (ref 0.66–1.25)
CRP SERPL-MCNC: 9.4 MG/L (ref 0–8)
DIFFERENTIAL METHOD BLD: ABNORMAL
EOSINOPHIL # BLD AUTO: 0.1 10E9/L (ref 0–0.7)
EOSINOPHIL NFR BLD AUTO: 2.3 %
ERYTHROCYTE [DISTWIDTH] IN BLOOD BY AUTOMATED COUNT: 15.8 % (ref 10–15)
FERRITIN SERPL-MCNC: 48 NG/ML (ref 26–388)
GFR SERPL CREATININE-BSD FRML MDRD: 63 ML/MIN/{1.73_M2}
GLUCOSE SERPL-MCNC: 124 MG/DL (ref 70–99)
HCT VFR BLD AUTO: 35.1 % (ref 40–53)
HGB BLD-MCNC: 11.5 G/DL (ref 13.3–17.7)
IMM GRANULOCYTES # BLD: 0.2 10E9/L (ref 0–0.4)
IMM GRANULOCYTES NFR BLD: 3.8 %
LYMPHOCYTES # BLD AUTO: 0.3 10E9/L (ref 0.8–5.3)
LYMPHOCYTES NFR BLD AUTO: 4.5 %
MCH RBC QN AUTO: 30.7 PG (ref 26.5–33)
MCHC RBC AUTO-ENTMCNC: 32.8 G/DL (ref 31.5–36.5)
MCV RBC AUTO: 94 FL (ref 78–100)
MONOCYTES # BLD AUTO: 0.8 10E9/L (ref 0–1.3)
MONOCYTES NFR BLD AUTO: 13.4 %
NEUTROPHILS # BLD AUTO: 4.2 10E9/L (ref 1.6–8.3)
NEUTROPHILS NFR BLD AUTO: 75.5 %
NRBC # BLD AUTO: 0 10*3/UL
NRBC BLD AUTO-RTO: 0 /100
PLATELET # BLD AUTO: 110 10E9/L (ref 150–450)
POTASSIUM SERPL-SCNC: 3.8 MMOL/L (ref 3.4–5.3)
PROT SERPL-MCNC: 6 G/DL (ref 6.8–8.8)
RBC # BLD AUTO: 3.75 10E12/L (ref 4.4–5.9)
SODIUM SERPL-SCNC: 138 MMOL/L (ref 133–144)
WBC # BLD AUTO: 5.6 10E9/L (ref 4–11)

## 2020-09-22 PROCEDURE — G0463 HOSPITAL OUTPT CLINIC VISIT: HCPCS

## 2020-09-22 PROCEDURE — 82728 ASSAY OF FERRITIN: CPT

## 2020-09-22 PROCEDURE — 80053 COMPREHEN METABOLIC PANEL: CPT

## 2020-09-22 PROCEDURE — 25000128 H RX IP 250 OP 636: Mod: ZF | Performed by: PHYSICIAN ASSISTANT

## 2020-09-22 PROCEDURE — 40000937 ZZHCL STATISTIC RESEARCH KIT COLLECTION

## 2020-09-22 PROCEDURE — 36591 DRAW BLOOD OFF VENOUS DEVICE: CPT

## 2020-09-22 PROCEDURE — 86140 C-REACTIVE PROTEIN: CPT

## 2020-09-22 PROCEDURE — 85025 COMPLETE CBC W/AUTO DIFF WBC: CPT

## 2020-09-22 RX ORDER — HEPARIN SODIUM (PORCINE) LOCK FLUSH IV SOLN 100 UNIT/ML 100 UNIT/ML
5 SOLUTION INTRAVENOUS EVERY 8 HOURS
Status: DISCONTINUED | OUTPATIENT
Start: 2020-09-22 | End: 2020-09-22 | Stop reason: HOSPADM

## 2020-09-22 RX ADMIN — Medication 5 ML: at 09:30

## 2020-09-22 ASSESSMENT — PAIN SCALES - GENERAL: PAINLEVEL: MODERATE PAIN (4)

## 2020-09-22 NOTE — LETTER
9/22/2020         RE: Juvenal Blake  [facility] [place] [address]        Dear Colleague,    Thank you for referring your patient, Juvenal Blake, to the [facility] BLOOD AND MARROW TRANSPLANT. Please see a copy of my visit note below.    BMT Daily Progress Note     ID: Juvenal Blake is a 54 year old male with follicular lymphoma s/p NAM NK cell therapy. Today is Day+21     HPI:  Juvenal is here for follow up. His cold symptoms are stable.  He has a hx of getting URIs.  He has congestion.  No new/productive cough.  No fever.  No sick contacts. Some fatigue, but not dramatic. He does feel dyspneic. Nausea still present, especially if he doesn't have any food in his stomach. Eating appropriate amounts.  No emesis. Prior loose stools improved.  He is having some low back pain.  He also notes that his R groin LN is more firm and causing him more discomfort.  No testicular enlargement/pain.  No urinary complaints.          ROS: negative except as stated above in HPI     Physical Exam  Blood pressure 126/80, pulse 68, temperature 98.3  F (36.8  C), temperature source Oral, resp. rate 16, weight 111.3 kg (245 lb 4.8 oz), SpO2 99 %.    KPS=80, ECOG=1  General: NAD   Eyes: JOE, sclera anicteric   Nose/Mouth/Throat: OP clear, buccal mucosa moist, no ulcerations   Lungs: CTA bilaterally. Breathing comfortably on room air. His O2 sats are 100% on room air  Cardiovascular: RRR, no M/R/G   Abdominal/Rectal: +BS, soft, NT, ND  Lymphatics: No edema--right groin with hard palpable LN, now mildly tender to palpation, more firm and well-circumscribed (had been more boggy/less well defined in past)  Skin: mild erythema over R Lower and L med, lower abd at prior injection sites is resolved.  Neuro: A&O - CN II-XII are intact, non focal, no tremor  Access: interval removal of PICC- port a cath:  Non tender     Lab  Lab Results   Component Value Date    WBC 1.5 (L) 09/15/2020    ANEU 0.8 (L) 09/15/2020    HGB 11.4 (L)  09/15/2020    HCT 34.3 (L) 09/15/2020     09/15/2020     09/15/2020    POTASSIUM 3.8 09/15/2020    CHLORIDE 109 09/15/2020    CO2 27 09/15/2020     (H) 09/15/2020    BUN 14 09/15/2020    CR 1.26 (H) 09/15/2020    MAG 2.1 09/04/2020    INR 1.04 09/01/2020        A/P:  Juvenal Blake is a 54 year old male with follicular lymphoma admitted for [facility] NK cell therapy. Today is Day +21     Day 0 - NK cells + IL-2  Day 1 rest day  Day 2 NK cells + IL-2  Day 3 rest day (no tissue bx or bmbx needed)  Day 4 = IL-2, can be given early in the day then patient can be discharged 4 hours after dose has been given  Day 10 = rituxan     1.  BMT/Cellular therapy: NAM NK for follicular lymphoma  - Allopurinol through day 7--discontinued 9/8 as complete  - GCSF - ok to start after D+14. His ANC dropped to 0.8 (could be du to cold sxs vs rituxan?), s/p GCSF 9/15.  WBC/ANC ok today.  - D+28 PET scan scheduled for next week      2.  HEME: Keep Hgb>7 and plts>10K. No pre-meds.            3.  ID:   - nasal congestion + dry cough. COVID test negative (9/15)  - prophy fluc, LD ACV (CMV+, HSV+, EBV+) and bactrim. Neutropenic at last visit and started leva prophy 9/15--ok to stop today (ANC 4.2)   - Hx hypogammaglobulinemia, has received intermittent IVIG                              4.  GI:  - Cont scheduled compazine tid for ongoing nausea after LD chemo  - Ativan and compazine prn, compazine works better for him  - Prilosec for GI prophy  - Psyllium daily     5.  FEN/Renal:   - Monitor creat and lytes     6.   - Hx prostate cancer s/p radical prostatectomy with positive margins + radiation through May 2018  - continue tolterodine     7. Derm:  Rash likely due to IL-2 is resolved       RTC in one week for labs and provider visit per study  - advised to call if fever > 100.4 or if cough becomes productiv      9/29 labs/PET  9/30 review (decide on length of fluc/acv prophy)--applying for FMLA- sliding scale/disablilty --  length depending on results at this visit    Tasia Liu pa-c  [phone]        Again, thank you for allowing me to participate in the care of your patient.        Sincerely,        BMT Advanced Practice Provider

## 2020-09-22 NOTE — NURSING NOTE
"Oncology Rooming Note    September 22, 2020 9:53 AM   Juvenal Blake is a 55 year old male who presents for:    Chief Complaint   Patient presents with     Lab Only     labs drawn via port by RN in lab, saline and heparin locked, vitals completed     RECHECK     Pt is here for a rtn for Lymphoma      Initial Vitals: Blood Pressure 126/80   Pulse 68   Temperature 98.3  F (36.8  C) (Oral)   Respiration 16   Weight 111.3 kg (245 lb 4.8 oz)   Oxygen Saturation 99%   Body Mass Index 37.83 kg/m   Estimated body mass index is 37.83 kg/m  as calculated from the following:    Height as of 8/29/20: 1.715 m (5' 7.52\").    Weight as of this encounter: 111.3 kg (245 lb 4.8 oz). Body surface area is 2.3 meters squared.  Moderate Pain (4) Comment: Data Unavailable   No LMP for male patient.  Allergies reviewed: Yes  Medications reviewed: Yes    Medications: Medication refills not needed today.  Pharmacy name entered into [employer]: [id number] - [place], [place] - [address] AT [facility]    Clinical concerns: none       Maricelvania Clayton MA            "

## 2020-09-22 NOTE — PROGRESS NOTES
BMT Daily Progress Note     ID: Juvenal Blake is a 54 year old male with follicular lymphoma s/p NAM NK cell therapy. Today is Day+21     HPI:  Juvenal is here for follow up. His cold symptoms are stable.  He has a hx of getting URIs.  He has congestion.  No new/productive cough.  No fever.  No sick contacts. Some fatigue, but not dramatic. He does feel dyspneic. Nausea still present, especially if he doesn't have any food in his stomach. Eating appropriate amounts.  No emesis. Prior loose stools improved.  He is having some low back pain.  He also notes that his R groin LN is more firm and causing him more discomfort.  No testicular enlargement/pain.  No urinary complaints.          ROS: negative except as stated above in HPI     Physical Exam  Blood pressure 126/80, pulse 68, temperature 98.3  F (36.8  C), temperature source Oral, resp. rate 16, weight 111.3 kg (245 lb 4.8 oz), SpO2 99 %.    KPS=80, ECOG=1  General: NAD   Eyes: JOE, sclera anicteric   Nose/Mouth/Throat: OP clear, buccal mucosa moist, no ulcerations   Lungs: CTA bilaterally. Breathing comfortably on room air. His O2 sats are 100% on room air  Cardiovascular: RRR, no M/R/G   Abdominal/Rectal: +BS, soft, NT, ND  Lymphatics: No edema--right groin with hard palpable LN, now mildly tender to palpation, more firm and well-circumscribed (had been more boggy/less well defined in past)  Skin: mild erythema over R Lower and L med, lower abd at prior injection sites is resolved.  Neuro: A&O - CN II-XII are intact, non focal, no tremor  Access: interval removal of PICC- port a cath:  Non tender     Lab  Lab Results   Component Value Date    WBC 1.5 (L) 09/15/2020    ANEU 0.8 (L) 09/15/2020    HGB 11.4 (L) 09/15/2020    HCT 34.3 (L) 09/15/2020     09/15/2020     09/15/2020    POTASSIUM 3.8 09/15/2020    CHLORIDE 109 09/15/2020    CO2 27 09/15/2020     (H) 09/15/2020    BUN 14 09/15/2020    CR 1.26 (H) 09/15/2020    MAG 2.1 09/04/2020    INR  1.04 09/01/2020        A/P:  Juvenal Blake is a 54 year old male with follicular lymphoma admitted for [facility] NK cell therapy. Today is Day +21     Day 0 - NK cells + IL-2  Day 1 rest day  Day 2 NK cells + IL-2  Day 3 rest day (no tissue bx or bmbx needed)  Day 4 = IL-2, can be given early in the day then patient can be discharged 4 hours after dose has been given  Day 10 = rituxan     1.  BMT/Cellular therapy: NAM NK for follicular lymphoma  - Allopurinol through day 7--discontinued 9/8 as complete  - GCSF - ok to start after D+14. His ANC dropped to 0.8 (could be du to cold sxs vs rituxan?), s/p GCSF 9/15.  WBC/ANC ok today.  - D+28 PET scan scheduled for next week      2.  HEME: Keep Hgb>7 and plts>10K. No pre-meds.            3.  ID:   - nasal congestion + dry cough. COVID test negative (9/15)  - prophy fluc, LD ACV (CMV+, HSV+, EBV+) and bactrim. Neutropenic at last visit and started leva prophy 9/15--ok to stop today (ANC 4.2)   - Hx hypogammaglobulinemia, has received intermittent IVIG                              4.  GI:  - Cont scheduled compazine tid for ongoing nausea after LD chemo  - Ativan and compazine prn, compazine works better for him  - Prilosec for GI prophy  - Psyllium daily     5.  FEN/Renal:   - Monitor creat and lytes     6.   - Hx prostate cancer s/p radical prostatectomy with positive margins + radiation through May 2018  - continue tolterodine     7. Derm:  Rash likely due to IL-2 is resolved       RTC in one week for labs and provider visit per study  - advised to call if fever > 100.4 or if cough becomes productiv      9/29 labs/PET  9/30 review (decide on length of fluc/acv prophy)--applying for FMLA- sliding scale/disablilty -- length depending on results at this visit    Tasia Liu pa-c  [phone]

## 2020-09-24 LAB — RESEARCH KIT COLLECTION: NORMAL

## 2020-09-25 LAB — COPATH REPORT: NORMAL

## 2020-09-29 ENCOUNTER — HOSPITAL ENCOUNTER (EMERGENCY)
Facility: CLINIC | Age: 55
Discharge: HOME | End: 2020-09-30
Attending: EMERGENCY MEDICINE | Admitting: EMERGENCY MEDICINE
Payer: COMMERCIAL

## 2020-09-29 ENCOUNTER — APPOINTMENT (OUTPATIENT)
Dept: GENERAL RADIOLOGY | Facility: CLINIC | Age: 55
End: 2020-09-29
Attending: EMERGENCY MEDICINE
Payer: COMMERCIAL

## 2020-09-29 ENCOUNTER — HOSPITAL ENCOUNTER (OUTPATIENT)
Dept: PET IMAGING | Facility: CLINIC | Age: 55
End: 2020-09-29
Payer: COMMERCIAL

## 2020-09-29 ENCOUNTER — ONCOLOGY VISIT (OUTPATIENT)
Dept: TRANSPLANT | Facility: CLINIC | Age: 55
End: 2020-09-29
Payer: COMMERCIAL

## 2020-09-29 ENCOUNTER — TELEPHONE (OUTPATIENT)
Dept: TRANSPLANT | Facility: CLINIC | Age: 55
End: 2020-09-29

## 2020-09-29 ENCOUNTER — RESULTS ONLY (OUTPATIENT)
Dept: LAB | Age: 55
End: 2020-09-29

## 2020-09-29 VITALS
TEMPERATURE: 99.6 F | DIASTOLIC BLOOD PRESSURE: 73 MMHG | SYSTOLIC BLOOD PRESSURE: 124 MMHG | HEART RATE: 73 BPM | RESPIRATION RATE: 20 BRPM | OXYGEN SATURATION: 96 % | BODY MASS INDEX: 37.74 KG/M2 | WEIGHT: 244.7 LBS

## 2020-09-29 DIAGNOSIS — C82.08 FOLLICULAR LYMPHOMA GRADE I, LYMPH NODES OF MULTIPLE SITES (H): ICD-10-CM

## 2020-09-29 DIAGNOSIS — C82.08 FOLLICULAR LYMPHOMA GRADE I, LYMPH NODES OF MULTIPLE SITES (H): Primary | ICD-10-CM

## 2020-09-29 DIAGNOSIS — C85.90 NHL (NON-HODGKIN'S LYMPHOMA) (H): Primary | ICD-10-CM

## 2020-09-29 DIAGNOSIS — R50.9 FEVER OF UNKNOWN ORIGIN: ICD-10-CM

## 2020-09-29 LAB
ALBUMIN SERPL-MCNC: 3.5 G/DL (ref 3.4–5)
ALP SERPL-CCNC: 84 U/L (ref 40–150)
ALT SERPL W P-5'-P-CCNC: 37 U/L (ref 0–70)
ANION GAP SERPL CALCULATED.3IONS-SCNC: 6 MMOL/L (ref 3–14)
AST SERPL W P-5'-P-CCNC: 27 U/L (ref 0–45)
BASOPHILS # BLD AUTO: 0 10E9/L (ref 0–0.2)
BASOPHILS NFR BLD AUTO: 0.4 %
BILIRUB SERPL-MCNC: 0.6 MG/DL (ref 0.2–1.3)
BUN SERPL-MCNC: 12 MG/DL (ref 7–30)
CALCIUM SERPL-MCNC: 8.7 MG/DL (ref 8.5–10.1)
CHLORIDE SERPL-SCNC: 105 MMOL/L (ref 94–109)
CO2 SERPL-SCNC: 26 MMOL/L (ref 20–32)
CREAT SERPL-MCNC: 1.28 MG/DL (ref 0.66–1.25)
DIFFERENTIAL METHOD BLD: ABNORMAL
EOSINOPHIL # BLD AUTO: 0 10E9/L (ref 0–0.7)
EOSINOPHIL NFR BLD AUTO: 0.7 %
ERYTHROCYTE [DISTWIDTH] IN BLOOD BY AUTOMATED COUNT: 15.9 % (ref 10–15)
GFR SERPL CREATININE-BSD FRML MDRD: 62 ML/MIN/{1.73_M2}
GLUCOSE SERPL-MCNC: 115 MG/DL (ref 70–99)
HCT VFR BLD AUTO: 33.3 % (ref 40–53)
HGB BLD-MCNC: 11.2 G/DL (ref 13.3–17.7)
IMM GRANULOCYTES # BLD: 0.1 10E9/L (ref 0–0.4)
IMM GRANULOCYTES NFR BLD: 1.1 %
LYMPHOCYTES # BLD AUTO: 0.1 10E9/L (ref 0.8–5.3)
LYMPHOCYTES NFR BLD AUTO: 2.5 %
MCH RBC QN AUTO: 31.3 PG (ref 26.5–33)
MCHC RBC AUTO-ENTMCNC: 33.6 G/DL (ref 31.5–36.5)
MCV RBC AUTO: 93 FL (ref 78–100)
MONOCYTES # BLD AUTO: 0.3 10E9/L (ref 0–1.3)
MONOCYTES NFR BLD AUTO: 5.8 %
NEUTROPHILS # BLD AUTO: 4 10E9/L (ref 1.6–8.3)
NEUTROPHILS NFR BLD AUTO: 89.5 %
NRBC # BLD AUTO: 0 10*3/UL
NRBC BLD AUTO-RTO: 0 /100
PLATELET # BLD AUTO: 95 10E9/L (ref 150–450)
POTASSIUM SERPL-SCNC: 3.8 MMOL/L (ref 3.4–5.3)
PROT SERPL-MCNC: 6.2 G/DL (ref 6.8–8.8)
RBC # BLD AUTO: 3.58 10E12/L (ref 4.4–5.9)
SODIUM SERPL-SCNC: 137 MMOL/L (ref 133–144)
WBC # BLD AUTO: 4.5 10E9/L (ref 4–11)

## 2020-09-29 PROCEDURE — 99284 EMERGENCY DEPT VISIT MOD MDM: CPT | Mod: 25 | Performed by: EMERGENCY MEDICINE

## 2020-09-29 PROCEDURE — 85025 COMPLETE CBC W/AUTO DIFF WBC: CPT | Performed by: INTERNAL MEDICINE

## 2020-09-29 PROCEDURE — 96361 HYDRATE IV INFUSION ADD-ON: CPT | Performed by: EMERGENCY MEDICINE

## 2020-09-29 PROCEDURE — 71260 CT THORAX DX C+: CPT

## 2020-09-29 PROCEDURE — 25000128 H RX IP 250 OP 636

## 2020-09-29 PROCEDURE — 96365 THER/PROPH/DIAG IV INF INIT: CPT | Performed by: EMERGENCY MEDICINE

## 2020-09-29 PROCEDURE — A9552 F18 FDG: HCPCS

## 2020-09-29 PROCEDURE — 70491 CT SOFT TISSUE NECK W/DYE: CPT

## 2020-09-29 PROCEDURE — 34300033 ZZH RX 343

## 2020-09-29 PROCEDURE — 96372 THER/PROPH/DIAG INJ SC/IM: CPT | Performed by: EMERGENCY MEDICINE

## 2020-09-29 PROCEDURE — 25000128 H RX IP 250 OP 636: Performed by: INTERNAL MEDICINE

## 2020-09-29 PROCEDURE — 71045 X-RAY EXAM CHEST 1 VIEW: CPT

## 2020-09-29 PROCEDURE — 82232 ASSAY OF BETA-2 PROTEIN: CPT | Performed by: INTERNAL MEDICINE

## 2020-09-29 PROCEDURE — 99284 EMERGENCY DEPT VISIT MOD MDM: CPT | Mod: Z6 | Performed by: EMERGENCY MEDICINE

## 2020-09-29 PROCEDURE — 80053 COMPREHEN METABOLIC PANEL: CPT | Performed by: INTERNAL MEDICINE

## 2020-09-29 RX ORDER — HEPARIN SODIUM (PORCINE) LOCK FLUSH IV SOLN 100 UNIT/ML 100 UNIT/ML
500 SOLUTION INTRAVENOUS ONCE
Status: COMPLETED | OUTPATIENT
Start: 2020-09-29 | End: 2020-09-29

## 2020-09-29 RX ORDER — HEPARIN SODIUM (PORCINE) LOCK FLUSH IV SOLN 100 UNIT/ML 100 UNIT/ML
5 SOLUTION INTRAVENOUS
Status: COMPLETED | OUTPATIENT
Start: 2020-09-29 | End: 2020-09-29

## 2020-09-29 RX ORDER — IOPAMIDOL 755 MG/ML
45-135 INJECTION, SOLUTION INTRAVASCULAR ONCE
Status: COMPLETED | OUTPATIENT
Start: 2020-09-29 | End: 2020-09-29

## 2020-09-29 RX ORDER — OXYCODONE HYDROCHLORIDE 5 MG/1
5 TABLET ORAL EVERY 6 HOURS PRN
Qty: 30 TABLET | Refills: 0 | Status: ON HOLD | OUTPATIENT
Start: 2020-09-29 | End: 2020-10-05

## 2020-09-29 RX ADMIN — Medication 5 ML: at 11:14

## 2020-09-29 RX ADMIN — IOPAMIDOL 135 ML: 755 INJECTION, SOLUTION INTRAVENOUS at 13:01

## 2020-09-29 RX ADMIN — Medication 500 UNITS: at 13:30

## 2020-09-29 RX ADMIN — FLUDEOXYGLUCOSE F-18 14.64 MCI.: 500 INJECTION, SOLUTION INTRAVENOUS at 12:30

## 2020-09-29 ASSESSMENT — ENCOUNTER SYMPTOMS
SORE THROAT: 1
HEMATURIA: 0
COUGH: 0
DIARRHEA: 0
NAUSEA: 0
FREQUENCY: 0
DYSURIA: 0
MYALGIAS: 1
FEVER: 1
SHORTNESS OF BREATH: 0
VOMITING: 0
HEADACHES: 0
DIFFICULTY URINATING: 0

## 2020-09-29 ASSESSMENT — MIFFLIN-ST. JEOR: SCORE: 1906.28

## 2020-09-29 ASSESSMENT — PAIN SCALES - GENERAL: PAINLEVEL: MODERATE PAIN (4)

## 2020-09-29 NOTE — TELEPHONE ENCOUNTER
Pt called experiencing symptoms. I informed pt that I will page an NAYA to give Nancy/Juvenal a call @ [phone] and if pt does not get a call back within half an hour to call [phone] again. Pt agreed. NAYA paged.      Pt had a temp of 100.1 F this AM.  He took tylenol and quickly defervesced to 98.5F.  He has chronic URI symptoms.  No new or worsening cough.  His biggest complaint is of R groin pain--very difficult to walk, stand and even sit.  He gets little to no relief with naprosyn.  He is due for a PET scan today and f/u in BMT clinic tomorrow.  No lightheadedness.  He does have an indwelling port.     I am reassured that he is not neutropenic (9/22 ANC is 4.2) and that he nicely defervesced.    We spent a good deal of time discussing his pain regimen.  He thinks he has possibly taken narcotics in the past.  He will try oxycodone 5mg q 6 hours for pain--starting with 5mg to assess for response.   I have told him that he should not take with lorazepam (this med appears on his med list but he has not been taking it recently).      He will get a covid test (drive through) today, PET scan later today as planned, start oxycodone prn pain and f/u in BMT clinic as planned tomorrow.    I have asked him to call should he develop a fever of 100.5F or higher or if he feels worse.     Tasia Liu pa-c  [phone]

## 2020-09-29 NOTE — NURSING NOTE
"Chief Complaint   Patient presents with     Port Draw     Labs drawn via Port by RN in lab. vital signs taken     Port accessed with 20g 3/4\" power needle and labs (including research) drawn by rn.  Port flushed with NS and heparin.  Pt tolerated well.  VS taken.    Lexus Byrd, RN  "

## 2020-09-29 NOTE — ED AVS SNAPSHOT
[facility], [place], Emergency Department  [address] [place] [id number]  Phone:  [phone]                                    Mr. Juvenal Blake   MRN: [id number]    Department:  [facility], Emergency Department   Date of Visit:  9/29/2020           After Visit Summary Signature Page    I have received my discharge instructions, and my questions have been answered. I have discussed any challenges I see with this plan with the nurse or doctor.    ..........................................................................................................................................  Patient/Patient Representative Signature      ..........................................................................................................................................  Patient Representative Print Name and Relationship to Patient    ..................................................               ................................................  Date                                   Time    ..........................................................................................................................................  Reviewed by Signature/Title    ...................................................              ..............................................  Date                                               Time          22EPIC Rev 08/18

## 2020-09-29 NOTE — LETTER
9/29/2020         RE: Juvenal Blake  [address] [place] [address]        Dear Colleague,    Thank you for referring your patient, Juvenal Blake, to the [facility] BLOOD AND MARROW TRANSPLANT. Please see a copy of my visit note below.    Please see telephone note.        Again, thank you for allowing me to participate in the care of your patient.        Sincerely,        BMT Advanced Practice Provider

## 2020-09-30 ENCOUNTER — HOSPITAL ENCOUNTER (INPATIENT)
Facility: CLINIC | Age: 55
LOS: 5 days | Discharge: HOME | DRG: 871 | End: 2020-10-05
Attending: INTERNAL MEDICINE | Admitting: STUDENT IN AN ORGANIZED HEALTH CARE EDUCATION/TRAINING PROGRAM
Payer: COMMERCIAL

## 2020-09-30 ENCOUNTER — APPOINTMENT (OUTPATIENT)
Dept: LAB | Facility: CLINIC | Age: 55
DRG: 871 | End: 2020-09-30
Attending: INTERNAL MEDICINE
Payer: COMMERCIAL

## 2020-09-30 ENCOUNTER — APPOINTMENT (OUTPATIENT)
Dept: GENERAL RADIOLOGY | Facility: CLINIC | Age: 55
DRG: 871 | End: 2020-09-30
Attending: STUDENT IN AN ORGANIZED HEALTH CARE EDUCATION/TRAINING PROGRAM
Payer: COMMERCIAL

## 2020-09-30 ENCOUNTER — OFFICE VISIT (OUTPATIENT)
Dept: TRANSPLANT | Facility: CLINIC | Age: 55
DRG: 871 | End: 2020-09-30
Attending: INTERNAL MEDICINE
Payer: COMMERCIAL

## 2020-09-30 VITALS
HEIGHT: 68 IN | BODY MASS INDEX: 36.98 KG/M2 | DIASTOLIC BLOOD PRESSURE: 48 MMHG | OXYGEN SATURATION: 96 % | RESPIRATION RATE: 20 BRPM | TEMPERATURE: 99.1 F | WEIGHT: 244 LBS | HEART RATE: 86 BPM | SYSTOLIC BLOOD PRESSURE: 91 MMHG

## 2020-09-30 VITALS
DIASTOLIC BLOOD PRESSURE: 77 MMHG | HEIGHT: 68 IN | TEMPERATURE: 99.8 F | SYSTOLIC BLOOD PRESSURE: 125 MMHG | WEIGHT: 243 LBS | HEART RATE: 88 BPM | RESPIRATION RATE: 16 BRPM | OXYGEN SATURATION: 100 % | BODY MASS INDEX: 36.83 KG/M2

## 2020-09-30 DIAGNOSIS — C82.08 FOLLICULAR LYMPHOMA GRADE I, LYMPH NODES OF MULTIPLE SITES (H): ICD-10-CM

## 2020-09-30 DIAGNOSIS — C82.08 FOLLICULAR LYMPHOMA GRADE I, LYMPH NODES OF MULTIPLE SITES (H): Primary | ICD-10-CM

## 2020-09-30 DIAGNOSIS — R32 URINARY INCONTINENCE, UNSPECIFIED TYPE: Primary | ICD-10-CM

## 2020-09-30 DIAGNOSIS — A41.9 SEPSIS, DUE TO UNSPECIFIED ORGANISM, UNSPECIFIED WHETHER ACUTE ORGAN DYSFUNCTION PRESENT (H): ICD-10-CM

## 2020-09-30 LAB
ALBUMIN SERPL-MCNC: 3.4 G/DL (ref 3.4–5)
ALBUMIN SERPL-MCNC: 3.5 G/DL (ref 3.4–5)
ALBUMIN UR-MCNC: NEGATIVE MG/DL
ALP SERPL-CCNC: 75 U/L (ref 40–150)
ALP SERPL-CCNC: 82 U/L (ref 40–150)
ALT SERPL W P-5'-P-CCNC: 36 U/L (ref 0–70)
ALT SERPL W P-5'-P-CCNC: 40 U/L (ref 0–70)
ANION GAP SERPL CALCULATED.3IONS-SCNC: 6 MMOL/L (ref 3–14)
ANION GAP SERPL CALCULATED.3IONS-SCNC: 6 MMOL/L (ref 3–14)
ANISOCYTOSIS BLD QL SMEAR: SLIGHT
APPEARANCE UR: CLEAR
AST SERPL W P-5'-P-CCNC: 35 U/L (ref 0–45)
AST SERPL W P-5'-P-CCNC: 35 U/L (ref 0–45)
B2 MICROGLOB SERPL-MCNC: 4 MG/L
BASOPHILS # BLD AUTO: 0 10E9/L (ref 0–0.2)
BASOPHILS # BLD AUTO: 0 10E9/L (ref 0–0.2)
BASOPHILS NFR BLD AUTO: 0 %
BASOPHILS NFR BLD AUTO: 0.3 %
BILIRUB SERPL-MCNC: 0.5 MG/DL (ref 0.2–1.3)
BILIRUB SERPL-MCNC: 0.7 MG/DL (ref 0.2–1.3)
BILIRUB UR QL STRIP: NEGATIVE
BUN SERPL-MCNC: 10 MG/DL (ref 7–30)
BUN SERPL-MCNC: 9 MG/DL (ref 7–30)
CALCIUM SERPL-MCNC: 8.2 MG/DL (ref 8.5–10.1)
CALCIUM SERPL-MCNC: 8.2 MG/DL (ref 8.5–10.1)
CHLORIDE SERPL-SCNC: 104 MMOL/L (ref 94–109)
CHLORIDE SERPL-SCNC: 105 MMOL/L (ref 94–109)
CO2 SERPL-SCNC: 26 MMOL/L (ref 20–32)
CO2 SERPL-SCNC: 26 MMOL/L (ref 20–32)
COLOR UR AUTO: YELLOW
COPATH REPORT: NORMAL
CREAT SERPL-MCNC: 1.24 MG/DL (ref 0.66–1.25)
CREAT SERPL-MCNC: 1.37 MG/DL (ref 0.66–1.25)
DEPRECATED S PYO AG THROAT QL EIA: POSITIVE
DIFFERENTIAL METHOD BLD: ABNORMAL
DIFFERENTIAL METHOD BLD: ABNORMAL
EOSINOPHIL # BLD AUTO: 0 10E9/L (ref 0–0.7)
EOSINOPHIL # BLD AUTO: 0 10E9/L (ref 0–0.7)
EOSINOPHIL NFR BLD AUTO: 0 %
EOSINOPHIL NFR BLD AUTO: 0.3 %
ERYTHROCYTE [DISTWIDTH] IN BLOOD BY AUTOMATED COUNT: 16.3 % (ref 10–15)
ERYTHROCYTE [DISTWIDTH] IN BLOOD BY AUTOMATED COUNT: 16.4 % (ref 10–15)
GFR SERPL CREATININE-BSD FRML MDRD: 57 ML/MIN/{1.73_M2}
GFR SERPL CREATININE-BSD FRML MDRD: 64 ML/MIN/{1.73_M2}
GLUCOSE SERPL-MCNC: 114 MG/DL (ref 70–99)
GLUCOSE SERPL-MCNC: 118 MG/DL (ref 70–99)
GLUCOSE UR STRIP-MCNC: NEGATIVE MG/DL
HCT VFR BLD AUTO: 31 % (ref 40–53)
HCT VFR BLD AUTO: 31.8 % (ref 40–53)
HGB BLD-MCNC: 10.4 G/DL (ref 13.3–17.7)
HGB BLD-MCNC: 10.5 G/DL (ref 13.3–17.7)
HGB UR QL STRIP: NEGATIVE
IMM GRANULOCYTES # BLD: 0 10E9/L (ref 0–0.4)
IMM GRANULOCYTES NFR BLD: 0.8 %
KETONES UR STRIP-MCNC: NEGATIVE MG/DL
LACTATE BLD-SCNC: 1.1 MMOL/L (ref 0.7–2)
LACTATE BLD-SCNC: 1.9 MMOL/L (ref 0.7–2)
LDH SERPL L TO P-CCNC: 354 U/L (ref 85–227)
LEUKOCYTE ESTERASE UR QL STRIP: NEGATIVE
LYMPHOCYTES # BLD AUTO: 0 10E9/L (ref 0.8–5.3)
LYMPHOCYTES # BLD AUTO: 0.1 10E9/L (ref 0.8–5.3)
LYMPHOCYTES NFR BLD AUTO: 0.9 %
LYMPHOCYTES NFR BLD AUTO: 2.4 %
MCH RBC QN AUTO: 30.6 PG (ref 26.5–33)
MCH RBC QN AUTO: 30.8 PG (ref 26.5–33)
MCHC RBC AUTO-ENTMCNC: 33 G/DL (ref 31.5–36.5)
MCHC RBC AUTO-ENTMCNC: 33.5 G/DL (ref 31.5–36.5)
MCV RBC AUTO: 91 FL (ref 78–100)
MCV RBC AUTO: 93 FL (ref 78–100)
MONOCYTES # BLD AUTO: 0.1 10E9/L (ref 0–1.3)
MONOCYTES # BLD AUTO: 0.3 10E9/L (ref 0–1.3)
MONOCYTES NFR BLD AUTO: 5.2 %
MONOCYTES NFR BLD AUTO: 7.2 %
MUCOUS THREADS #/AREA URNS LPF: PRESENT /LPF
NEUTROPHILS # BLD AUTO: 2.2 10E9/L (ref 1.6–8.3)
NEUTROPHILS # BLD AUTO: 3.3 10E9/L (ref 1.6–8.3)
NEUTROPHILS NFR BLD AUTO: 89 %
NEUTROPHILS NFR BLD AUTO: 93.9 %
NITRATE UR QL: NEGATIVE
NRBC # BLD AUTO: 0 10*3/UL
NRBC BLD AUTO-RTO: 0 /100
PH UR STRIP: 6 PH (ref 5–7)
PLATELET # BLD AUTO: 71 10E9/L (ref 150–450)
PLATELET # BLD AUTO: 88 10E9/L (ref 150–450)
PLATELET # BLD EST: ABNORMAL 10*3/UL
POTASSIUM SERPL-SCNC: 3.6 MMOL/L (ref 3.4–5.3)
POTASSIUM SERPL-SCNC: 3.7 MMOL/L (ref 3.4–5.3)
PROCALCITONIN SERPL-MCNC: 0.56 NG/ML
PROT SERPL-MCNC: 6.2 G/DL (ref 6.8–8.8)
PROT SERPL-MCNC: 6.2 G/DL (ref 6.8–8.8)
RBC # BLD AUTO: 3.4 10E12/L (ref 4.4–5.9)
RBC # BLD AUTO: 3.41 10E12/L (ref 4.4–5.9)
RBC #/AREA URNS AUTO: <1 /HPF (ref 0–2)
SARS-COV-2 RNA SPEC QL NAA+PROBE: NOT DETECTED
SODIUM SERPL-SCNC: 136 MMOL/L (ref 133–144)
SODIUM SERPL-SCNC: 137 MMOL/L (ref 133–144)
SOURCE: ABNORMAL
SP GR UR STRIP: 1.02 (ref 1–1.03)
SPECIMEN SOURCE: ABNORMAL
SPECIMEN SOURCE: NORMAL
URATE SERPL-MCNC: 3.2 MG/DL (ref 3.5–7.2)
URATE SERPL-MCNC: 3.8 MG/DL (ref 3.5–7.2)
UROBILINOGEN UR STRIP-MCNC: NORMAL MG/DL (ref 0–2)
WBC # BLD AUTO: 2.3 10E9/L (ref 4–11)
WBC # BLD AUTO: 3.7 10E9/L (ref 4–11)
WBC #/AREA URNS AUTO: <1 /HPF (ref 0–5)

## 2020-09-30 PROCEDURE — 84550 ASSAY OF BLOOD/URIC ACID: CPT | Performed by: EMERGENCY MEDICINE

## 2020-09-30 PROCEDURE — 87205 SMEAR GRAM STAIN: CPT | Performed by: STUDENT IN AN ORGANIZED HEALTH CARE EDUCATION/TRAINING PROGRAM

## 2020-09-30 PROCEDURE — 25800030 ZZH RX IP 258 OP 636: Performed by: EMERGENCY MEDICINE

## 2020-09-30 PROCEDURE — 99223 1ST HOSP IP/OBS HIGH 75: CPT | Mod: AI | Performed by: STUDENT IN AN ORGANIZED HEALTH CARE EDUCATION/TRAINING PROGRAM

## 2020-09-30 PROCEDURE — 87040 BLOOD CULTURE FOR BACTERIA: CPT | Performed by: INTERNAL MEDICINE

## 2020-09-30 PROCEDURE — 71046 X-RAY EXAM CHEST 2 VIEWS: CPT

## 2020-09-30 PROCEDURE — 25000132 ZZH RX MED GY IP 250 OP 250 PS 637: Mod: ZF | Performed by: INTERNAL MEDICINE

## 2020-09-30 PROCEDURE — 81001 URINALYSIS AUTO W/SCOPE: CPT | Performed by: EMERGENCY MEDICINE

## 2020-09-30 PROCEDURE — 87040 BLOOD CULTURE FOR BACTERIA: CPT | Performed by: STUDENT IN AN ORGANIZED HEALTH CARE EDUCATION/TRAINING PROGRAM

## 2020-09-30 PROCEDURE — 25000128 H RX IP 250 OP 636: Performed by: EMERGENCY MEDICINE

## 2020-09-30 PROCEDURE — 36415 COLL VENOUS BLD VENIPUNCTURE: CPT | Performed by: INTERNAL MEDICINE

## 2020-09-30 PROCEDURE — 83605 ASSAY OF LACTIC ACID: CPT | Performed by: EMERGENCY MEDICINE

## 2020-09-30 PROCEDURE — 25000128 H RX IP 250 OP 636: Performed by: INTERNAL MEDICINE

## 2020-09-30 PROCEDURE — 87070 CULTURE OTHR SPECIMN AEROBIC: CPT | Performed by: STUDENT IN AN ORGANIZED HEALTH CARE EDUCATION/TRAINING PROGRAM

## 2020-09-30 PROCEDURE — 25000128 H RX IP 250 OP 636: Mod: ZF | Performed by: INTERNAL MEDICINE

## 2020-09-30 PROCEDURE — 85025 COMPLETE CBC W/AUTO DIFF WBC: CPT | Performed by: EMERGENCY MEDICINE

## 2020-09-30 PROCEDURE — 96372 THER/PROPH/DIAG INJ SC/IM: CPT | Performed by: EMERGENCY MEDICINE

## 2020-09-30 PROCEDURE — 25000125 ZZHC RX 250: Mod: ZF | Performed by: INTERNAL MEDICINE

## 2020-09-30 PROCEDURE — 20600000 ZZH R&B BMT

## 2020-09-30 PROCEDURE — 80053 COMPREHEN METABOLIC PANEL: CPT

## 2020-09-30 PROCEDURE — 83615 LACTATE (LD) (LDH) ENZYME: CPT

## 2020-09-30 PROCEDURE — G0463 HOSPITAL OUTPT CLINIC VISIT: HCPCS | Mod: ZF

## 2020-09-30 PROCEDURE — 25800030 ZZH RX IP 258 OP 636: Performed by: STUDENT IN AN ORGANIZED HEALTH CARE EDUCATION/TRAINING PROGRAM

## 2020-09-30 PROCEDURE — 25000132 ZZH RX MED GY IP 250 OP 250 PS 637: Performed by: STUDENT IN AN ORGANIZED HEALTH CARE EDUCATION/TRAINING PROGRAM

## 2020-09-30 PROCEDURE — 96365 THER/PROPH/DIAG IV INF INIT: CPT | Performed by: EMERGENCY MEDICINE

## 2020-09-30 PROCEDURE — 25800030 ZZH RX IP 258 OP 636: Performed by: INTERNAL MEDICINE

## 2020-09-30 PROCEDURE — 87880 STREP A ASSAY W/OPTIC: CPT | Performed by: EMERGENCY MEDICINE

## 2020-09-30 PROCEDURE — 87040 BLOOD CULTURE FOR BACTERIA: CPT | Performed by: EMERGENCY MEDICINE

## 2020-09-30 PROCEDURE — 84145 PROCALCITONIN (PCT): CPT | Performed by: EMERGENCY MEDICINE

## 2020-09-30 PROCEDURE — 25000128 H RX IP 250 OP 636: Performed by: STUDENT IN AN ORGANIZED HEALTH CARE EDUCATION/TRAINING PROGRAM

## 2020-09-30 PROCEDURE — 84550 ASSAY OF BLOOD/URIC ACID: CPT

## 2020-09-30 PROCEDURE — 85025 COMPLETE CBC W/AUTO DIFF WBC: CPT

## 2020-09-30 PROCEDURE — 83605 ASSAY OF LACTIC ACID: CPT | Performed by: STUDENT IN AN ORGANIZED HEALTH CARE EDUCATION/TRAINING PROGRAM

## 2020-09-30 PROCEDURE — 80053 COMPREHEN METABOLIC PANEL: CPT | Performed by: EMERGENCY MEDICINE

## 2020-09-30 RX ORDER — ACETAMINOPHEN 325 MG/1
650 TABLET ORAL EVERY 4 HOURS PRN
Status: DISCONTINUED | OUTPATIENT
Start: 2020-09-30 | End: 2020-10-05 | Stop reason: HOSPADM

## 2020-09-30 RX ORDER — NALOXONE HYDROCHLORIDE 0.4 MG/ML
.1-.4 INJECTION, SOLUTION INTRAMUSCULAR; INTRAVENOUS; SUBCUTANEOUS
Status: DISCONTINUED | OUTPATIENT
Start: 2020-09-30 | End: 2020-10-05 | Stop reason: HOSPADM

## 2020-09-30 RX ORDER — CEFTRIAXONE 2 G/1
2 INJECTION, POWDER, FOR SOLUTION INTRAMUSCULAR; INTRAVENOUS ONCE
Status: COMPLETED | OUTPATIENT
Start: 2020-09-30 | End: 2020-09-30

## 2020-09-30 RX ORDER — HEPARIN SODIUM (PORCINE) LOCK FLUSH IV SOLN 100 UNIT/ML 100 UNIT/ML
5 SOLUTION INTRAVENOUS ONCE
Status: DISCONTINUED | OUTPATIENT
Start: 2020-09-30 | End: 2020-10-01 | Stop reason: HOSPADM

## 2020-09-30 RX ORDER — HEPARIN SODIUM (PORCINE) LOCK FLUSH IV SOLN 100 UNIT/ML 100 UNIT/ML
5 SOLUTION INTRAVENOUS
Status: DISCONTINUED | OUTPATIENT
Start: 2020-09-30 | End: 2020-09-30 | Stop reason: HOSPADM

## 2020-09-30 RX ORDER — TOLTERODINE 4 MG/1
4 CAPSULE, EXTENDED RELEASE ORAL DAILY
Status: DISCONTINUED | OUTPATIENT
Start: 2020-10-01 | End: 2020-10-05 | Stop reason: HOSPADM

## 2020-09-30 RX ORDER — OXYCODONE HYDROCHLORIDE 5 MG/1
5 TABLET ORAL EVERY 6 HOURS PRN
Status: DISCONTINUED | OUTPATIENT
Start: 2020-09-30 | End: 2020-10-01

## 2020-09-30 RX ORDER — CEFEPIME HYDROCHLORIDE 1 G/1
1 INJECTION, POWDER, FOR SOLUTION INTRAMUSCULAR; INTRAVENOUS EVERY 12 HOURS
Status: DISCONTINUED | OUTPATIENT
Start: 2020-09-30 | End: 2020-09-30

## 2020-09-30 RX ORDER — ACYCLOVIR 400 MG/1
400 TABLET ORAL 2 TIMES DAILY
Status: DISCONTINUED | OUTPATIENT
Start: 2020-09-30 | End: 2020-10-05 | Stop reason: HOSPADM

## 2020-09-30 RX ORDER — SODIUM CHLORIDE 9 MG/ML
INJECTION, SOLUTION INTRAVENOUS CONTINUOUS
Status: ACTIVE | OUTPATIENT
Start: 2020-09-30 | End: 2020-10-01

## 2020-09-30 RX ORDER — SULFAMETHOXAZOLE/TRIMETHOPRIM 800-160 MG
1 TABLET ORAL
Status: DISCONTINUED | OUTPATIENT
Start: 2020-10-05 | End: 2020-10-05 | Stop reason: HOSPADM

## 2020-09-30 RX ORDER — HEPARIN SODIUM 5000 [USP'U]/.5ML
5000 INJECTION, SOLUTION INTRAVENOUS; SUBCUTANEOUS EVERY 8 HOURS
Status: DISCONTINUED | OUTPATIENT
Start: 2020-09-30 | End: 2020-10-05 | Stop reason: HOSPADM

## 2020-09-30 RX ORDER — PROCHLORPERAZINE MALEATE 5 MG
5 TABLET ORAL
Status: DISCONTINUED | OUTPATIENT
Start: 2020-09-30 | End: 2020-10-05 | Stop reason: HOSPADM

## 2020-09-30 RX ORDER — ACETAMINOPHEN 325 MG/1
650 TABLET ORAL EVERY 4 HOURS PRN
Status: DISCONTINUED | OUTPATIENT
Start: 2020-09-30 | End: 2020-09-30

## 2020-09-30 RX ORDER — FLUCONAZOLE 100 MG/1
100 TABLET ORAL DAILY
Status: DISCONTINUED | OUTPATIENT
Start: 2020-10-01 | End: 2020-10-05 | Stop reason: HOSPADM

## 2020-09-30 RX ADMIN — PROCHLORPERAZINE MALEATE 5 MG: 5 TABLET ORAL at 20:48

## 2020-09-30 RX ADMIN — CEFEPIME HYDROCHLORIDE 2 G: 2 INJECTION, POWDER, FOR SOLUTION INTRAVENOUS at 23:26

## 2020-09-30 RX ADMIN — SODIUM CHLORIDE: 9 INJECTION, SOLUTION INTRAVENOUS at 20:25

## 2020-09-30 RX ADMIN — CEFEPIME HYDROCHLORIDE 1 G: 1 INJECTION, POWDER, FOR SOLUTION INTRAMUSCULAR; INTRAVENOUS at 16:50

## 2020-09-30 RX ADMIN — CEFTRIAXONE SODIUM 2 G: 2 INJECTION, POWDER, FOR SOLUTION INTRAMUSCULAR; INTRAVENOUS at 01:42

## 2020-09-30 RX ADMIN — ACETAMINOPHEN 650 MG: 325 TABLET, FILM COATED ORAL at 20:25

## 2020-09-30 RX ADMIN — ACYCLOVIR 400 MG: 400 TABLET ORAL at 20:25

## 2020-09-30 RX ADMIN — PENICILLIN G BENZATHINE 1.2 MILLION UNITS: 1200000 INJECTION, SUSPENSION INTRAMUSCULAR at 02:28

## 2020-09-30 RX ADMIN — Medication 5 ML: at 04:45

## 2020-09-30 RX ADMIN — HEPARIN SODIUM 5000 UNITS: 5000 INJECTION, SOLUTION INTRAVENOUS; SUBCUTANEOUS at 20:28

## 2020-09-30 RX ADMIN — VANCOMYCIN HYDROCHLORIDE 2500 MG: 500 INJECTION, POWDER, LYOPHILIZED, FOR SOLUTION INTRAVENOUS at 02:28

## 2020-09-30 RX ADMIN — ACETAMINOPHEN 650 MG: 325 TABLET ORAL at 16:10

## 2020-09-30 RX ADMIN — SODIUM CHLORIDE 1000 ML: 9 INJECTION, SOLUTION INTRAVENOUS at 00:13

## 2020-09-30 RX ADMIN — VANCOMYCIN HYDROCHLORIDE 1750 MG: 10 INJECTION, POWDER, LYOPHILIZED, FOR SOLUTION INTRAVENOUS at 20:44

## 2020-09-30 ASSESSMENT — ACTIVITIES OF DAILY LIVING (ADL)
COGNITION: 0 - NO COGNITION ISSUES REPORTED
DRESS: 0-->INDEPENDENT
FALL_HISTORY_WITHIN_LAST_SIX_MONTHS: NO
TRANSFERRING: 0-->INDEPENDENT
BATHING: 0-->INDEPENDENT
RETIRED_COMMUNICATION: 0-->UNDERSTANDS/COMMUNICATES WITHOUT DIFFICULTY
RETIRED_EATING: 0-->INDEPENDENT
AMBULATION: 0-->INDEPENDENT
SWALLOWING: 0-->SWALLOWS FOODS/LIQUIDS WITHOUT DIFFICULTY
TOILETING: 0-->INDEPENDENT
ADLS_ACUITY_SCORE: 10

## 2020-09-30 ASSESSMENT — MIFFLIN-ST. JEOR: SCORE: 1901.61

## 2020-09-30 ASSESSMENT — PAIN SCALES - GENERAL: PAINLEVEL: MILD PAIN (3)

## 2020-09-30 NOTE — H&P
[facility], [place]    History and Physical - Hospitalist Service, [place] 2       Date of Admission:  9/30/2020    Assessment & Plan   Juvenal Blake is a 55 year old male admitted on 9/30/2020. He is a 54 year old male with PMH of follicular lymphoma who was admitted from clinic due to 2 days of fevers and chills.     Sepsis due to ?strep pharyngitis vs cellulitis  Concerning for bacteremia  Positive rapid strep on 9/29. Denies sore throat. Does have young children at home. No LAD appreciated on exam. No erythema or exudates appreciated on exam. Does have nasal congestion, denies cough. Febrile to 103 on 9/29. Not neutropenic at this point. High concern for bacteremia, although Bcx from 9/29 ED visit are NGTD. Lactate 1.1. Area around port with small amount of erythema. UA from 9/29 without sign of infection - asymptomatic. CXR clear. He was given IV ceftriaxone and pencillin G in ED on 9/29 for the strep throat. COVID negative 9/15 and 9/29.    - Repeat Blood cultures - one from port one from peripheral   - Cefepime IV 2g Q8hr   - Pharm to dose vanc - cover in case of skin sarwat from port   - Continue acyclovir, fluconazole, bactrim   - Sputum culture pending   - Throat culture pending   - Monitor fevers   - APAP PRN    Follicular lymphoma s/p NAM NK cell therapy  Hypogammaglobulinemia  Seen in clinic today by Dr. Brown. Today is Day+29. S/p allopurinol. PET on 9/29 showing rapid progression of lymphoma.     Pancytopenia 2/2 medication  On chemo therapy. Hgb 10.4 on admission. Plt 71K. WBC 2.3, not neutropenic.   - Continue to monitor    - Goal Hgb >7, Plt >10    Livedo reticularis  Diffusely - most prominent on abdomen and back, concerning for bacteremia   - Continue to monitor.     Chronic:   - Hx of prostate cancer s/p radical prostatectomy with positive margins and radiation: Continue tolterodine     Diet: Regular  DVT Prophylaxis: Heparin SQ  Hahn Catheter: not  present  Code Status: Full         Disposition Plan   Expected discharge: 4 - 7 days, recommended to prior living arrangement once antibiotic plan established.  Entered: Rosenda Mederos MD 09/30/2020, 8:59 PM     The patient's care was discussed with the Bedside Nurse and Patient.    Rosenda Mederos MD  [facility], [place]  Pager: [phone]  Please see sticky note for cross cover information  ______________________________________________________________________    Chief Complaint   Fever, chills    History is obtained from the patient and electronic health record    History of Present Illness   Juvenal Blake is a 55 year old male who has a PMH of follicular lymphoma s/p NAM NK cell therapy now day+29, prostate cancer s/p radical prostatectomy and radiation. He noticed 2 days ago that he was feeling chills and felt feverish. He went to the ED and was noted to have a fever of 103. He was tested and the only infectious positive test was a rapid strep. He was treated with ceftriaxone and pencillin G. He has continued to have fevers at home, although is not sure how high. He also endorses night sweats and day sweats - states he will reach along the back of his neck and it will be drenched. He continues to have chills as well. He does live with a 5year old and 3 year old - the 5 year old has started back at school - however no one, including the 5 year old, has been sick recently. He does have an occasional dull headache and lightheadedness. He endorses nausea and almost vomited yesterday but did not. He has had no appetite. He endorses sore throat occasionally over the past few days - primarily when he swallows. Denies any enlarged lymph nodes or other lumps/bumps.      He denies any chest pain or shortness of breath. He denies abdominal pain for me. No diarrhea or constipation. He has not had any pain with urination or changes in frequency.     Does have rash across body that he believes has  gotten worse over the last few days - primarily along his back and torso    Review of Systems    The 10 point Review of Systems is negative other than noted in the HPI or here.     Past Medical History    I have reviewed this patient's medical history and updated it with pertinent information if needed.   Past Medical History:   Diagnosis Date     Cancer (H)      Non Hodgkin's lymphoma (H)      Shortness of breath        Past Surgical History   I have reviewed this patient's surgical history and updated it with pertinent information if needed.  Past Surgical History:   Procedure Laterality Date     HERNIA REPAIR, UMBILICAL         Social History   I have reviewed this patient's social history and updated it with pertinent information if needed.  Social History     Tobacco Use     Smoking status: Former Smoker     Last attempt to quit: 1995     Years since quittin.3     Smokeless tobacco: Never Used   Substance Use Topics     Alcohol use: Yes     Frequency: 4 or more times a week     Drinks per session: 3 or 4     Drug use: Yes     Types: Marijuana       Family History   I have reviewed this patient's family history and updated it with pertinent information if needed.  Family History   Problem Relation Age of Onset     Colon Cancer Mother      Lymphoma Father      No Known Problems Brother      No Known Problems Sister      No Known Problems Son      No Known Problems Sister      No Known Problems Son        Prior to Admission Medications   Prior to Admission Medications   Prescriptions Last Dose Informant Patient Reported? Taking?   LORazepam (ATIVAN) 0.5 MG tablet Unknown at Unknown time  No No   Sig: Take 1 tablet (0.5 mg) by mouth every 4 hours as needed (Anxiety, Nausea/Vomiting or Sleep)   Psyllium (METAMUCIL FIBER) 51.7 % PACK 2020 at 0800  Yes Yes   Sig: Take 1 packet by mouth daily    acyclovir (ZOVIRAX) 400 MG tablet 2020 at 0800  No Yes   Sig: Take 1 tablet (400 mg) by mouth every 12  hours   fluconazole (DIFLUCAN) 100 MG tablet 9/30/2020 at 0800  No Yes   Sig: Take 1 tablet (100 mg) by mouth daily   hydrOXYzine (ATARAX) 50 MG tablet Unknown at Unknown time  No No   Sig: Take 1 tablet (50 mg) by mouth 3 times daily as needed for itching   Patient not taking: Reported on 9/8/2020   levofloxacin (LEVAQUIN) 250 MG tablet Unknown at Unknown time  No No   Sig: Take 1 tablet (250 mg) by mouth daily   medical cannabis (Patient's own supply) Unknown at Unknown time  Yes No   omeprazole (PRILOSEC) 20 MG DR capsule 9/30/2020 at 0800  Yes Yes   Sig: Take 1 capsule (20 mg) by mouth daily   oxyCODONE (ROXICODONE) 5 MG tablet 9/30/2020 at 1200  No Yes   Sig: Take 1 tablet (5 mg) by mouth every 6 hours as needed for severe pain   prochlorperazine (COMPAZINE) 5 MG tablet Unknown at Unknown time  No No   Sig: Take 1 tablet (5 mg) by mouth 4 times daily (before meals and nightly)   sulfamethoxazole-trimethoprim (BACTRIM DS) 800-160 MG tablet 9/29/2020 at 2000  No Yes   Sig: Take 1 tablet by mouth Every Mon, Tues two times daily   tolterodine ER (DETROL LA) 4 MG 24 hr capsule 9/30/2020 at 0800  Yes Yes   Sig: Take 4 mg by mouth daily       Facility-Administered Medications: None     Allergies   Allergies   Allergen Reactions     Ondansetron Other (See Comments) and Itching       Physical Exam   Vital Signs: Temp: 102.8  F (39.3  C) Temp src: Oral BP: 122/64 Pulse: 89   Resp: 16 SpO2: 95 % O2 Device: None (Room air)    Weight: 0 lbs 0 oz    Gen: NAD, alert, pleasant, cooperative, sitting up in bed  HEENT: EOMI, no scleral icterus, tracking appropriately, no LAD, no erythema or exudates within mouth or along pharynx.  Resp: CTAB, no crackles or wheezes, no increased WOB  Cardiac: RRR, no S3/S4, no M/R/G appreciated  GI: soft, non-tender, non-distended  Ext: WWP, no edema, spontaneous movement in all 4  Neuro: AOx3, CN 2-12 grossly intact, appropriate mentation  Skin: livedo reticularis appearing rash along lower  extremities/torso/back - lightly erythematous and serpiginous. Not warm to touch, mildly damp.      Data   Data reviewed today: I reviewed all medications, new labs and imaging results over the last 24 hours. I personally reviewed the chest x-ray image(s) showing clear airspaces.    Labs and Imaging reviewed in Epic, pertinent discussed in A&P.

## 2020-09-30 NOTE — ED PROVIDER NOTES
ED Provider Note  [facility]      History     Chief Complaint   Patient presents with     Fever     HPI  Juvenal Blake is a 55 year old non-Hodgkin's lymphoma patient who had his last dose of chemotherapy 4 weeks ago and this morning started to have a fever.  The patient complained of myalgias, complained of some upper airway congestion, but no headache, nausea, vomiting or diarrhea, and no definite sore throat, but complains of some mild discomfort in his throat.  The patient denies any shortness of breath or productive cough, and states that he took some Tylenol earlier for his fever.  Patient states that he was seen in the clinic and had some blood work done, and was sent back home only to redevelop his fever at home.  The patient now presents to the ER for evaluation.  The patient denies any UTI symptoms.    This part of the medical record was transcribed by Cristian Thayer, Medical Scribe, from a dictation done by Chandra Arredondo MD.       Past Medical History  Past Medical History:   Diagnosis Date     Cancer (H)      Non Hodgkin's lymphoma (H)      Shortness of breath      Past Surgical History:   Procedure Laterality Date     HERNIA REPAIR, UMBILICAL       acyclovir (ZOVIRAX) 400 MG tablet  fluconazole (DIFLUCAN) 100 MG tablet  hydrOXYzine (ATARAX) 50 MG tablet  levofloxacin (LEVAQUIN) 250 MG tablet  LORazepam (ATIVAN) 0.5 MG tablet  medical cannabis (Patient's own supply)  omeprazole (PRILOSEC) 20 MG DR capsule  oxyCODONE (ROXICODONE) 5 MG tablet  prochlorperazine (COMPAZINE) 5 MG tablet  Psyllium (METAMUCIL FIBER) 51.7 % PACK  sulfamethoxazole-trimethoprim (BACTRIM DS) 800-160 MG tablet  tolterodine ER (DETROL LA) 4 MG 24 hr capsule      Allergies   Allergen Reactions     Ondansetron Other (See Comments) and Itching     Family History  Family History   Problem Relation Age of Onset     Colon Cancer Mother      Lymphoma Father      No Known Problems Brother      No Known Problems  "Sister      No Known Problems Son      No Known Problems Sister      No Known Problems Son      Social History   Social History     Tobacco Use     Smoking status: Former Smoker     Last attempt to quit: 1995     Years since quittin.3     Smokeless tobacco: Never Used   Substance Use Topics     Alcohol use: Yes     Frequency: 4 or more times a week     Drinks per session: 3 or 4     Drug use: Yes     Types: Marijuana      Past medical history, past surgical history, medications, allergies, family history, and social history were reviewed with the patient. No additional pertinent items.       Review of Systems   Constitutional: Positive for fever.   HENT: Positive for congestion (upper airway) and sore throat (mild discomfort).    Respiratory: Negative for cough and shortness of breath.    Gastrointestinal: Negative for diarrhea, nausea and vomiting.   Genitourinary: Negative for decreased urine volume, difficulty urinating, dysuria, frequency, hematuria and urgency.   Musculoskeletal: Positive for myalgias.   Neurological: Negative for headaches.   All other systems reviewed and are negative.      Physical Exam   BP: (!) 127/91  Pulse: 80  Temp: 99.3  F (37.4  C)  Resp: 16  Height: 172.7 cm (5' 8\")  Weight: 110.7 kg (244 lb)  SpO2: 96 %  Physical Exam  Vitals signs and nursing note reviewed.   Constitutional:       Appearance: He is diaphoretic.   HENT:      Head: Atraumatic.      Mouth/Throat:      Pharynx: Posterior oropharyngeal erythema present.   Eyes:      Extraocular Movements: Extraocular movements intact.      Pupils: Pupils are equal, round, and reactive to light.   Neck:      Musculoskeletal: Neck supple.   Cardiovascular:      Rate and Rhythm: Regular rhythm.   Pulmonary:      Breath sounds: Normal breath sounds.   Abdominal:      Palpations: Abdomen is soft.      Tenderness: There is no abdominal tenderness.   Musculoskeletal:         General: No deformity.   Lymphadenopathy:      Cervical: No " cervical adenopathy.   Skin:     General: Skin is warm.      Findings: No rash.   Neurological:      General: No focal deficit present.      Mental Status: He is alert and oriented to person, place, and time.   Psychiatric:         Mood and Affect: Mood normal.         ED Course         Orders Placed This Encounter   Procedures     XR Chest Port 1 View     CBC with platelets differential     Comprehensive metabolic panel     Lactic acid whole blood     Procalcitonin     UA with Microscopic reflex to Culture     Pulse oximetry nursing     Peripheral IV catheter   Covid sent from clinic    Procedures        Results for orders placed or performed during the hospital encounter of 09/29/20 (from the past 24 hour(s))   XR Chest Port 1 View    Narrative    EXAM: XR CHEST PORT 1 VW  LOCATION: [facility]  DATE/TIME: 9/29/2020 10:52 PM    INDICATION: Fever  COMPARISON: 09/01/2020      Impression    IMPRESSION: Lungs clear. No focal infiltrates. Normal heart size. Normal pulmonary vascularity. Right-sided Port-A-Cath with tip in good position in mid SVC. Minimal elevation right hemidiaphragm.   Lactic acid whole blood   Result Value Ref Range    Lactic Acid 1.9 0.7 - 2.0 mmol/L   Blood culture    Specimen: Portacath; Blood    Port   Result Value Ref Range    Specimen Description Blood Port     Culture Micro PENDING    CBC with platelets differential   Result Value Ref Range    WBC 3.7 (L) 4.0 - 11.0 10e9/L    RBC Count 3.41 (L) 4.4 - 5.9 10e12/L    Hemoglobin 10.5 (L) 13.3 - 17.7 g/dL    Hematocrit 31.8 (L) 40.0 - 53.0 %    MCV 93 78 - 100 fl    MCH 30.8 26.5 - 33.0 pg    MCHC 33.0 31.5 - 36.5 g/dL    RDW 16.3 (H) 10.0 - 15.0 %    Platelet Count 88 (L) 150 - 450 10e9/L    Diff Method Automated Method     % Neutrophils 89.0 %    % Lymphocytes 2.4 %    % Monocytes 7.2 %    % Eosinophils 0.3 %    % Basophils 0.3 %    % Immature Granulocytes 0.8 %    Nucleated RBCs 0 0 /100    Absolute Neutrophil 3.3 1.6 - 8.3 10e9/L     Absolute Lymphocytes 0.1 (L) 0.8 - 5.3 10e9/L    Absolute Monocytes 0.3 0.0 - 1.3 10e9/L    Absolute Eosinophils 0.0 0.0 - 0.7 10e9/L    Absolute Basophils 0.0 0.0 - 0.2 10e9/L    Abs Immature Granulocytes 0.0 0 - 0.4 10e9/L    Absolute Nucleated RBC 0.0    Comprehensive metabolic panel   Result Value Ref Range    Sodium 136 133 - 144 mmol/L    Potassium 3.7 3.4 - 5.3 mmol/L    Chloride 104 94 - 109 mmol/L    Carbon Dioxide 26 20 - 32 mmol/L    Anion Gap 6 3 - 14 mmol/L    Glucose 118 (H) 70 - 99 mg/dL    Urea Nitrogen 10 7 - 30 mg/dL    Creatinine 1.37 (H) 0.66 - 1.25 mg/dL    GFR Estimate 57 (L) >60 mL/min/[1.73_m2]    GFR Estimate If Black 66 >60 mL/min/[1.73_m2]    Calcium 8.2 (L) 8.5 - 10.1 mg/dL    Bilirubin Total 0.5 0.2 - 1.3 mg/dL    Albumin 3.5 3.4 - 5.0 g/dL    Protein Total 6.2 (L) 6.8 - 8.8 g/dL    Alkaline Phosphatase 82 40 - 150 U/L    ALT 36 0 - 70 U/L    AST 35 0 - 45 U/L   Procalcitonin   Result Value Ref Range    Procalcitonin 0.56 ng/ml   Streptococcus A Rapid Scr w Reflx to PCR    Specimen: Throat   Result Value Ref Range    Strep Specimen Description Throat     Streptococcus Group A Rapid Screen Positive (A) NEG^Negative   UA with Microscopic reflex to Culture    Specimen: Urine Midstream; Midstream Urine   Result Value Ref Range    Color Urine Yellow     Appearance Urine Clear     Glucose Urine Negative NEG^Negative mg/dL    Bilirubin Urine Negative NEG^Negative    Ketones Urine Negative NEG^Negative mg/dL    Specific Gravity Urine 1.025 1.003 - 1.035    Blood Urine Negative NEG^Negative    pH Urine 6.0 5.0 - 7.0 pH    Protein Albumin Urine Negative NEG^Negative mg/dL    Urobilinogen mg/dL Normal 0.0 - 2.0 mg/dL    Nitrite Urine Negative NEG^Negative    Leukocyte Esterase Urine Negative NEG^Negative    Source Midstream Urine     WBC Urine <1 0 - 5 /HPF    RBC Urine <1 0 - 2 /HPF    Mucous Urine Present (A) NEG^Negative /LPF   Blood culture    Specimen: Portacath; Blood    Unspecified Site    Result Value Ref Range    Specimen Description Blood Unspecified Site     Culture Micro PENDING             Assessments & Plan (with Medical Decision Making)     I have reviewed the nursing notes.    Patient's COVID has not resulted as of yet but the patient did have a positive strep test.  Patient will be given the antibiotics here in the ER and sent home to follow-up with BMT clinic tomorrow as planned.    Medications   sodium chloride (PF) 0.9% PF flush 3 mL (has no administration in time range)   sodium chloride (PF) 0.9% PF flush 3 mL (has no administration in time range)   cefTRIAXone (ROCEPHIN) 2 g vial to attach to  ml bag for ADULTS or NS 50 ml bag for PEDS (2 g Intravenous New Bag 9/30/20 0142)   vancomycin (VANCOCIN) 2,500 mg in sodium chloride 0.9 % 500 mL intermittent infusion (has no administration in time range)   penicillin G benzathine (BICILLIN L-A) injection 1.2 Million Units (has no administration in time range)   0.9% sodium chloride BOLUS (1,000 mLs Intravenous New Bag 9/30/20 0013)       I have reviewed the findings, diagnosis, plan and need for follow up with the patient.        Final diagnoses:   Fever of unknown origin - possible strep pharyngitis     Please follow up as scheduled with BMT clinic tomorrow for recheck.    Routine discharge instructions were given for this diagnosis    Chandra Arredondo MD, MD    --  Chandra Arredondo MD  [facility], [place], EMERGENCY DEPARTMENT  9/29/2020     Chandra Arredondo MD  09/30/20 0143

## 2020-09-30 NOTE — NURSING NOTE
"Oncology Rooming Note    September 30, 2020 3:08 PM   Juvenal Blake is a 55 year old male who presents for:    Chief Complaint   Patient presents with     Port Draw     Labs drawn via PORT byRN in lab. VS taken.      Oncology Clinic Visit     FOLLICULAR LYMPHOMA      Initial Vitals: /69 (BP Location: Right arm, Patient Position: Sitting, Cuff Size: Adult Regular)   Pulse 82   Temp 98.7  F (37.1  C) (Oral)   Resp 16   Ht 1.727 m (5' 7.99\")   Wt 110.2 kg (243 lb)   SpO2 98%   BMI 36.96 kg/m   Estimated body mass index is 36.96 kg/m  as calculated from the following:    Height as of this encounter: 1.727 m (5' 7.99\").    Weight as of this encounter: 110.2 kg (243 lb). Body surface area is 2.3 meters squared.  Mild Pain (3) Comment: Data Unavailable   No LMP for male patient.  Allergies reviewed: Yes  Medications reviewed: Yes    Medications: Medication refills not needed today.  Pharmacy name entered into [employer]: "[id number] - [place], [place] - [address] AT [facility]    Clinical concerns: No new concerns today        Rox Ward MA            "

## 2020-09-30 NOTE — NURSING NOTE
Chief Complaint   Patient presents with     Port Draw     Labs drawn via PORT byRN in lab. VS taken.      Jodi Thorne RN

## 2020-09-30 NOTE — LETTER
"    9/30/2020         RE: Juvenal Blake  [address] [place] [address]        Dear Colleague,    Thank you for referring your patient, Juvenal Blake, to the [facility]. Please see a copy of my visit note below.    See research encounter.    BMT Daily Progress Note     ID: Juvenal Blake is a 54 year old male with follicular lymphoma s/p NAM NK cell therapy. Today is Day+29.  He presents to clinic today after developing fevers to 103F, sweats and worsening abdominal pain on 9/29.       HPI:  Juvenal is here for follow up after ED visit last evening for fever to 103F.  His rapid strep test at that time was positive and was given a dose of IV ceftriaxone and IM benzathine penicillin.  He has had drenching sweats associated with these fevers, worsening abdominal pain exacerbated by movement.  He is not eating or drinking well and is generally feeling poorly.  No diarrhea, no dysuria, or new skin wounds.       ROS: 10 point ROS negative except as stated above in HPI     Physical Exam  Blood pressure 128/69, pulse 82, temperature 98.7  F (37.1  C), temperature source Oral, resp. rate 16, height 1.727 m (5' 7.99\"), weight 110.2 kg (243 lb), SpO2 98 %.    KPS=80, ECOG=1  General: appears ill, shivering  Eyes: JOE, sclera anicteric   Nose/Mouth/Throat: OP with mild exudate, buccal mucosa moist, no ulcerations   Lungs: CTA bilaterally. No increased WOB. His O2 sats are 98% on room air  Cardiovascular: RRR, no M/R/G   Abdominal/Rectal: +BS, soft, mild distension, no guarding or rebound, no palpable masses  Lymphatics: Large palpable R inguinal LN, tender to palpation.  No palpable cervical or axillary LAD.   Skin: diffuse livedo reticularis on anterior abdomen and L flank.  No other rashes or wounds noted on examined areas of skin.   Neuro: A&O - CN II-XII are intact, non focal, no tremor  Access: port-a-cath, non-tender, no erythema or tenderness      Lab  Lab Results   Component Value " Date    WBC 2.3 (L) 09/30/2020    ANEU 2.2 09/30/2020    HGB 10.4 (L) 09/30/2020    HCT 31.0 (L) 09/30/2020    PLT 71 (L) 09/30/2020     09/30/2020    POTASSIUM 3.6 09/30/2020    CHLORIDE 105 09/30/2020    CO2 26 09/30/2020     (H) 09/30/2020    BUN 9 09/30/2020    CR 1.24 09/30/2020    MAG 2.1 09/04/2020    INR 1.04 09/01/2020        A/P:  Juvenal Blake is a 54 year old male with follicular lymphoma admitted for [facility] NK cell therapy. Today is Day +29.       Day 0 - NK cells + IL-2  Day 1 rest day  Day 2 NK cells + IL-2  Day 3 rest day (no tissue bx or bmbx needed)  Day 4 = IL-2, can be given early in the day then patient can be discharged 4 hours after dose has been given  Day 10 = rituxan     1.  BMT/Cellular therapy: NAM NK for follicular lymphoma  - Allopurinol through day 7--discontinued 9/8 as complete  - GCSF - ok to start after D+14. His ANC dropped to 0.8 (could be du to cold sxs vs rituxan?), s/p GCSF 9/15.  WBC/ANC OK on 9/30, but plts continue to decline without clear reason.    -His PET CT on 9/29 demonstrated rapid progression of lymphoma compared to 7/2020.  We would have expected some resolution of LAD.  He will need repeat biopsy via IR of lymphoma to evaluate for transformation of lymphoma given progression through LD chemotherapy earlier this month.       2.  HEME: Keep Hgb>7 and plts>10K. No pre-meds.            3.  ID:   - nasal congestion + dry cough. COVID test negative (9/15)  - prophy fluc, LD ACV (CMV+, HSV+, EBV+) and bactrim. Neutropenic at last visit and started leva prophy 9/15--ok to stop today (ANC 4.2)   - Hx hypogammaglobulinemia, has received intermittent IVIG  - 9/30/20-presents after ED visit evening of 9/29 for fever to 103F.  He had positive rapid strep and received IV ceftriaxone/vanc, and IM benzathine penicillin in the ED.  Blood cultures x2 (including from port) are NGTD at his appointment today.  No dysuria, CXR clear overnight 9/29.  No diarrhea.    -Will  admit at this time given our concerns for bacteremia given his high fevers, livedo and general poor appearance.  His fevers are too high to be secondary to his lymphoma alone.    - He was given 650mg tylenol in clinic.    - Cefepime pending port access in clinic versus floor when he is able to be transported to the hospital.    - Blood cultures repeated in clinic 9/30 prior to transport to .                                4.  GI:  - Cont scheduled compazine tid for ongoing nausea after LD chemo  - Ativan and compazine prn, compazine works better for him  - Prilosec for GI prophy  - Psyllium daily     5.  FEN/Renal:   - Monitor creat and lytes     6.   - Hx prostate cancer s/p radical prostatectomy with positive margins + radiation through May 2018  - continue tolterodine     7. Derm:  Rash likely due to IL-2 is resolved, however, he has now developed diffuse livedo reticularis on abdomen and L flank on 9/30 clinic appointment concerning for potential bacteremia.      RTC in one week for labs and provider visit per study  - advised to call if fever > 100.4 or if cough becomes productiv    Dispo: Admit to malignant hematology service due to concern for possible bacteremia.      Patient seen and discussed with Dr. Zavala.     Yrn Brown MD  Heme/Onc Fellow    Attestation: The patient was seen and examined by me with the assistant of Dr. Brown, PGY 6.  The note above reflects our mutual assessment and plan.  In summary this is a patient 20 days after natural killer cell therapy, who presents today for review and results.  He also has been dealing with fevers, sweats, and shivering for the last 48 hours.  He was in emergency department last night.  COVID testing was negative and he was positive for strep throat.  Today in clinic he was shivering, not febrile, but had mottled skin.  He did not feel very well.  On exam he had enlarged groin nodes but I could not feel any nodes on his neck or axillary regions.   He is moderately obese and I cannot feel enlarged masses in his abdomen.  Lungs were clear.  Throat had mild erythema but no obvious drainage or exudate as seen in the emergency department yesterday.  Based on his overall condition and also the fact that his blood counts seem to be dropping we elected to access his Port-A-Cath again draw some blood cultures, give him a dose of cefepime, and admit him to the hospital for further investigation and treatment.  The patient was agreeable to the plan.      Again, thank you for allowing me to participate in the care of your patient.        Sincerely,        BMT DOM

## 2020-09-30 NOTE — NURSING NOTE
"Admission SBAR:    Situation:  Why is the patient being admitted?  Fever    Background:  /77   Pulse 88   Temp 99.8  F (37.7  C) (Oral)   Resp 16   Ht 1.727 m (5' 7.99\")   Wt 110.2 kg (243 lb)   SpO2 100%   BMI 36.96 kg/m    Major diagnosis: Follicular lymphoma  Type of donor: Nam NK  Type of stem cells: NK cells  Relapsed? No  Summary of Interventions (if medications were administered, please specify time and color of lumen if applicable):    Antimicrobials? Iv Cefepime    Transfusions? No    Fluids? No    Neupogen? No    Other Medications? Yes: oral tylenol    Electrolytes? No    Blood cultures? Yes-1 Site(s): Single Portacath    UA? No    UC? No    Stool culture? No    Respiratory cultures? No    Current type and cross? No    Completed preadmission procedures: n/a    Procedures due: n/a    If procedures are scheduled, what time? Not Applicable    Assessment:  Potential Falls risk? No  Accompanied by: wife  Other Assessment: Not Applicable     Recommendations: Admit for further evaluation  Report called to Unit: 5C  Report called to Nurse: charge nurse  Transported by: family member  Via: car  "

## 2020-09-30 NOTE — PROGRESS NOTES
"BMT Daily Progress Note     ID: Juvenal Blake is a 54 year old male with follicular lymphoma s/p NAM NK cell therapy. Today is Day+29.  He presents to clinic today after developing fevers to 103F, sweats and worsening abdominal pain on 9/29.       HPI:  Juvenal is here for follow up after ED visit last evening for fever to 103F.  His rapid strep test at that time was positive and was given a dose of IV ceftriaxone and IM benzathine penicillin.  He has had drenching sweats associated with these fevers, worsening abdominal pain exacerbated by movement.  He is not eating or drinking well and is generally feeling poorly.  No diarrhea, no dysuria, or new skin wounds.       ROS: 10 point ROS negative except as stated above in HPI     Physical Exam  Blood pressure 128/69, pulse 82, temperature 98.7  F (37.1  C), temperature source Oral, resp. rate 16, height 1.727 m (5' 7.99\"), weight 110.2 kg (243 lb), SpO2 98 %.    KPS=80, ECOG=1  General: appears ill, shivering  Eyes: JOE, sclera anicteric   Nose/Mouth/Throat: OP with mild exudate, buccal mucosa moist, no ulcerations   Lungs: CTA bilaterally. No increased WOB. His O2 sats are 98% on room air  Cardiovascular: RRR, no M/R/G   Abdominal/Rectal: +BS, soft, mild distension, no guarding or rebound, no palpable masses  Lymphatics: Large palpable R inguinal LN, tender to palpation.  No palpable cervical or axillary LAD.   Skin: diffuse livedo reticularis on anterior abdomen and L flank.  No other rashes or wounds noted on examined areas of skin.   Neuro: A&O - CN II-XII are intact, non focal, no tremor  Access: port-a-cath, non-tender, no erythema or tenderness      Lab  Lab Results   Component Value Date    WBC 2.3 (L) 09/30/2020    ANEU 2.2 09/30/2020    HGB 10.4 (L) 09/30/2020    HCT 31.0 (L) 09/30/2020    PLT 71 (L) 09/30/2020     09/30/2020    POTASSIUM 3.6 09/30/2020    CHLORIDE 105 09/30/2020    CO2 26 09/30/2020     (H) 09/30/2020    BUN 9 09/30/2020    CR " 1.24 09/30/2020    MAG 2.1 09/04/2020    INR 1.04 09/01/2020        A/P:  Juvenal Blake is a 54 year old male with follicular lymphoma admitted for [facility] NK cell therapy. Today is Day +29.       Day 0 - NK cells + IL-2  Day 1 rest day  Day 2 NK cells + IL-2  Day 3 rest day (no tissue bx or bmbx needed)  Day 4 = IL-2, can be given early in the day then patient can be discharged 4 hours after dose has been given  Day 10 = rituxan     1.  BMT/Cellular therapy: NAM NK for follicular lymphoma  - Allopurinol through day 7--discontinued 9/8 as complete  - GCSF - ok to start after D+14. His ANC dropped to 0.8 (could be du to cold sxs vs rituxan?), s/p GCSF 9/15.  WBC/ANC OK on 9/30, but plts continue to decline without clear reason.    -His PET CT on 9/29 demonstrated rapid progression of lymphoma compared to 7/2020.  We would have expected some resolution of LAD.  He will need repeat biopsy via IR of lymphoma to evaluate for transformation of lymphoma given progression through LD chemotherapy earlier this month.       2.  HEME: Keep Hgb>7 and plts>10K. No pre-meds.            3.  ID:   - nasal congestion + dry cough. COVID test negative (9/15)  - prophy fluc, LD ACV (CMV+, HSV+, EBV+) and bactrim. Neutropenic at last visit and started leva prophy 9/15--ok to stop today (ANC 4.2)   - Hx hypogammaglobulinemia, has received intermittent IVIG  - 9/30/20-presents after ED visit evening of 9/29 for fever to 103F.  He had positive rapid strep and received IV ceftriaxone/vanc, and IM benzathine penicillin in the ED.  Blood cultures x2 (including from port) are NGTD at his appointment today.  No dysuria, CXR clear overnight 9/29.  No diarrhea.    -Will admit at this time given our concerns for bacteremia given his high fevers, livedo and general poor appearance.  His fevers are too high to be secondary to his lymphoma alone.    - He was given 650mg tylenol in clinic.    - Cefepime pending port access in clinic versus floor when he  is able to be transported to the hospital.    - Blood cultures repeated in clinic 9/30 prior to transport to .                                4.  GI:  - Cont scheduled compazine tid for ongoing nausea after LD chemo  - Ativan and compazine prn, compazine works better for him  - Prilosec for GI prophy  - Psyllium daily     5.  FEN/Renal:   - Monitor creat and lytes     6.   - Hx prostate cancer s/p radical prostatectomy with positive margins + radiation through May 2018  - continue tolterodine     7. Derm:  Rash likely due to IL-2 is resolved, however, he has now developed diffuse livedo reticularis on abdomen and L flank on 9/30 clinic appointment concerning for potential bacteremia.      RTC in one week for labs and provider visit per study  - advised to call if fever > 100.4 or if cough becomes productiv    Dispo: Admit to malignant hematology service due to concern for possible bacteremia.      Patient seen and discussed with Dr. Zavala.     Yrn Brown MD  Heme/Onc Fellow    Attestation: The patient was seen and examined by me with the assistant of Dr. Brown, PGY 6.  The note above reflects our mutual assessment and plan.  In summary this is a patient 20 days after natural killer cell therapy, who presents today for review and results.  He also has been dealing with fevers, sweats, and shivering for the last 48 hours.  He was in emergency department last night.  COVID testing was negative and he was positive for strep throat.  Today in clinic he was shivering, not febrile, but had mottled skin.  He did not feel very well.  On exam he had enlarged groin nodes but I could not feel any nodes on his neck or axillary regions.  He is moderately obese and I cannot feel enlarged masses in his abdomen.  Lungs were clear.  Throat had mild erythema but no obvious drainage or exudate as seen in the emergency department yesterday.  Based on his overall condition and also the fact that his blood counts seem to be  dropping we elected to access his Port-A-Cath again draw some blood cultures, give him a dose of cefepime, and admit him to the hospital for further investigation and treatment.  The patient was agreeable to the plan.

## 2020-09-30 NOTE — NURSING NOTE
Infusion Nursing Note:  Juvenal Blake presents today for IV Cefepime.    Patient seen by provider today: Yes: Dr. Zavala   present during visit today: Not Applicable.    Note: Patient here for add on IV Cefepime for fever and then admit to .    Intravenous Access:  Implanted Port.    Treatment Conditions:  Lab Results   Component Value Date    HGB 10.4 09/30/2020     Lab Results   Component Value Date    WBC 2.3 09/30/2020      Lab Results   Component Value Date    ANEU 2.2 09/30/2020     Lab Results   Component Value Date    PLT 71 09/30/2020      Lab Results   Component Value Date     09/30/2020                   Lab Results   Component Value Date    POTASSIUM 3.6 09/30/2020           Lab Results   Component Value Date    MAG 2.1 09/04/2020            Lab Results   Component Value Date    CR 1.24 09/30/2020                   Lab Results   Component Value Date    TRE 8.2 09/30/2020                Lab Results   Component Value Date    BILITOTAL 0.7 09/30/2020           Lab Results   Component Value Date    ALBUMIN 3.4 09/30/2020                    Lab Results   Component Value Date    ALT 40 09/30/2020           Lab Results   Component Value Date    AST 35 09/30/2020           Post Infusion Assessment:  Patient tolerated infusion without incident.  Blood return noted pre and post infusion.  Site patent and intact, free from redness, edema or discomfort.  No evidence of extravasations.  Left accessed because he is going inpatient      Discharge Plan:   Patient discharged in stable condition accompanied by: wife.  Departure Mode: Ambulatory to  room 21    PONCHO NGUYEN RN

## 2020-10-01 ENCOUNTER — APPOINTMENT (OUTPATIENT)
Dept: INTERVENTIONAL RADIOLOGY/VASCULAR | Facility: CLINIC | Age: 55
DRG: 871 | End: 2020-10-01
Payer: COMMERCIAL

## 2020-10-01 ENCOUNTER — APPOINTMENT (OUTPATIENT)
Dept: MRI IMAGING | Facility: CLINIC | Age: 55
DRG: 871 | End: 2020-10-01
Attending: PHYSICIAN ASSISTANT
Payer: COMMERCIAL

## 2020-10-01 ENCOUNTER — RESEARCH ENCOUNTER (OUTPATIENT)
Dept: TRANSPLANT | Facility: CLINIC | Age: 55
End: 2020-10-01

## 2020-10-01 LAB
ALBUMIN SERPL-MCNC: 3.1 G/DL (ref 3.4–5)
ALP SERPL-CCNC: 70 U/L (ref 40–150)
ALT SERPL W P-5'-P-CCNC: 40 U/L (ref 0–70)
ANION GAP SERPL CALCULATED.3IONS-SCNC: 6 MMOL/L (ref 3–14)
ANISOCYTOSIS BLD QL SMEAR: SLIGHT
AST SERPL W P-5'-P-CCNC: 42 U/L (ref 0–45)
BACTERIA SPEC CULT: ABNORMAL
BACTERIA SPEC CULT: ABNORMAL
BASOPHILS # BLD AUTO: 0 10E9/L (ref 0–0.2)
BASOPHILS NFR BLD AUTO: 0 %
BILIRUB SERPL-MCNC: 0.7 MG/DL (ref 0.2–1.3)
BUN SERPL-MCNC: 10 MG/DL (ref 7–30)
CALCIUM SERPL-MCNC: 7.8 MG/DL (ref 8.5–10.1)
CHLORIDE SERPL-SCNC: 104 MMOL/L (ref 94–109)
CO2 SERPL-SCNC: 27 MMOL/L (ref 20–32)
CREAT SERPL-MCNC: 1.3 MG/DL (ref 0.66–1.25)
DIFFERENTIAL METHOD BLD: ABNORMAL
EOSINOPHIL # BLD AUTO: 0 10E9/L (ref 0–0.7)
EOSINOPHIL NFR BLD AUTO: 0 %
ERYTHROCYTE [DISTWIDTH] IN BLOOD BY AUTOMATED COUNT: 16.1 % (ref 10–15)
GFR SERPL CREATININE-BSD FRML MDRD: 61 ML/MIN/{1.73_M2}
GLUCOSE SERPL-MCNC: 118 MG/DL (ref 70–99)
GRAM STN SPEC: ABNORMAL
HCT VFR BLD AUTO: 27.9 % (ref 40–53)
HGB BLD-MCNC: 9.3 G/DL (ref 13.3–17.7)
LDH SERPL L TO P-CCNC: 434 U/L (ref 85–227)
LYMPHOCYTES # BLD AUTO: 0 10E9/L (ref 0.8–5.3)
LYMPHOCYTES NFR BLD AUTO: 2.6 %
Lab: ABNORMAL
MAGNESIUM SERPL-MCNC: 1.6 MG/DL (ref 1.6–2.3)
MCH RBC QN AUTO: 30.9 PG (ref 26.5–33)
MCHC RBC AUTO-ENTMCNC: 33.3 G/DL (ref 31.5–36.5)
MCV RBC AUTO: 93 FL (ref 78–100)
MISCELLANEOUS TEST: NORMAL
MONOCYTES # BLD AUTO: 0 10E9/L (ref 0–1.3)
MONOCYTES NFR BLD AUTO: 1.7 %
NEUTROPHILS # BLD AUTO: 1.4 10E9/L (ref 1.6–8.3)
NEUTROPHILS NFR BLD AUTO: 95.7 %
PHOSPHATE SERPL-MCNC: 2 MG/DL (ref 2.5–4.5)
PLATELET # BLD AUTO: 61 10E9/L (ref 150–450)
PLATELET # BLD EST: ABNORMAL 10*3/UL
POTASSIUM SERPL-SCNC: 3.6 MMOL/L (ref 3.4–5.3)
PROT SERPL-MCNC: 5.6 G/DL (ref 6.8–8.8)
RBC # BLD AUTO: 3.01 10E12/L (ref 4.4–5.9)
SODIUM SERPL-SCNC: 137 MMOL/L (ref 133–144)
SPECIMEN SOURCE: ABNORMAL
SPECIMEN SOURCE: ABNORMAL
WBC # BLD AUTO: 1.5 10E9/L (ref 4–11)

## 2020-10-01 PROCEDURE — 85097 BONE MARROW INTERPRETATION: CPT | Mod: 26 | Performed by: STUDENT IN AN ORGANIZED HEALTH CARE EDUCATION/TRAINING PROGRAM

## 2020-10-01 PROCEDURE — 88313 SPECIAL STAINS GROUP 2: CPT | Mod: 26 | Performed by: STUDENT IN AN ORGANIZED HEALTH CARE EDUCATION/TRAINING PROGRAM

## 2020-10-01 PROCEDURE — 272N000185 HC ACCESSORY CR1

## 2020-10-01 PROCEDURE — 70553 MRI BRAIN STEM W/O & W/DYE: CPT | Mod: 26 | Performed by: RADIOLOGY

## 2020-10-01 PROCEDURE — 70553 MRI BRAIN STEM W/O & W/DYE: CPT

## 2020-10-01 PROCEDURE — 88275 CYTOGENETICS 100-300: CPT | Mod: TC | Performed by: PHYSICIAN ASSISTANT

## 2020-10-01 PROCEDURE — 250N000011 HC RX IP 250 OP 636: Performed by: STUDENT IN AN ORGANIZED HEALTH CARE EDUCATION/TRAINING PROGRAM

## 2020-10-01 PROCEDURE — 87305 ASPERGILLUS AG IA: CPT | Performed by: PHYSICIAN ASSISTANT

## 2020-10-01 PROCEDURE — 999N001102 HC STATISTIC DNA PROCESS AND HOLD: Performed by: PHYSICIAN ASSISTANT

## 2020-10-01 PROCEDURE — 88188 FLOWCYTOMETRY/READ 9-15: CPT | Mod: 59 | Performed by: PATHOLOGY

## 2020-10-01 PROCEDURE — 88342 IMHCHEM/IMCYTCHM 1ST ANTB: CPT | Mod: 26 | Performed by: PATHOLOGY

## 2020-10-01 PROCEDURE — 88280 CHROMOSOME KARYOTYPE STUDY: CPT | Mod: TC | Performed by: PHYSICIAN ASSISTANT

## 2020-10-01 PROCEDURE — 88264 CHROMOSOME ANALYSIS 20-25: CPT | Mod: TC | Performed by: PHYSICIAN ASSISTANT

## 2020-10-01 PROCEDURE — 88311 DECALCIFY TISSUE: CPT | Mod: TC | Performed by: PHYSICIAN ASSISTANT

## 2020-10-01 PROCEDURE — 250N000013 HC RX MED GY IP 250 OP 250 PS 637: Performed by: PHYSICIAN ASSISTANT

## 2020-10-01 PROCEDURE — 88305 TISSUE EXAM BY PATHOLOGIST: CPT | Mod: TC | Performed by: PHYSICIAN ASSISTANT

## 2020-10-01 PROCEDURE — 85025 COMPLETE CBC W/AUTO DIFF WBC: CPT | Performed by: INTERNAL MEDICINE

## 2020-10-01 PROCEDURE — 83735 ASSAY OF MAGNESIUM: CPT | Performed by: INTERNAL MEDICINE

## 2020-10-01 PROCEDURE — 88342 IMHCHEM/IMCYTCHM 1ST ANTB: CPT | Mod: TC | Performed by: PHYSICIAN ASSISTANT

## 2020-10-01 PROCEDURE — 96372 THER/PROPH/DIAG INJ SC/IM: CPT | Performed by: STUDENT IN AN ORGANIZED HEALTH CARE EDUCATION/TRAINING PROGRAM

## 2020-10-01 PROCEDURE — 88342 IMHCHEM/IMCYTCHM 1ST ANTB: CPT | Mod: 26 | Performed by: STUDENT IN AN ORGANIZED HEALTH CARE EDUCATION/TRAINING PROGRAM

## 2020-10-01 PROCEDURE — 255N000002 HC RX 255 OP 636: Performed by: INTERNAL MEDICINE

## 2020-10-01 PROCEDURE — 88364 INSITU HYBRIDIZATION (FISH): CPT | Mod: 26 | Performed by: PATHOLOGY

## 2020-10-01 PROCEDURE — 88184 FLOWCYTOMETRY/ TC 1 MARKER: CPT | Mod: TC | Performed by: PHYSICIAN ASSISTANT

## 2020-10-01 PROCEDURE — 88305 TISSUE EXAM BY PATHOLOGIST: CPT | Mod: 26 | Performed by: STUDENT IN AN ORGANIZED HEALTH CARE EDUCATION/TRAINING PROGRAM

## 2020-10-01 PROCEDURE — 38505 NEEDLE BIOPSY LYMPH NODES: CPT | Mod: RT | Performed by: RADIOLOGY

## 2020-10-01 PROCEDURE — 258N000003 HC RX IP 258 OP 636: Performed by: STUDENT IN AN ORGANIZED HEALTH CARE EDUCATION/TRAINING PROGRAM

## 2020-10-01 PROCEDURE — 88364 INSITU HYBRIDIZATION (FISH): CPT | Mod: TC | Performed by: PHYSICIAN ASSISTANT

## 2020-10-01 PROCEDURE — 85060 BLOOD SMEAR INTERPRETATION: CPT | Mod: 26 | Performed by: STUDENT IN AN ORGANIZED HEALTH CARE EDUCATION/TRAINING PROGRAM

## 2020-10-01 PROCEDURE — 83615 LACTATE (LD) (LDH) ENZYME: CPT | Performed by: PHYSICIAN ASSISTANT

## 2020-10-01 PROCEDURE — 76942 ECHO GUIDE FOR BIOPSY: CPT | Mod: 26 | Performed by: RADIOLOGY

## 2020-10-01 PROCEDURE — 258N000003 HC RX IP 258 OP 636: Performed by: INTERNAL MEDICINE

## 2020-10-01 PROCEDURE — 250N000011 HC RX IP 250 OP 636: Performed by: INTERNAL MEDICINE

## 2020-10-01 PROCEDURE — 999N001022 HC STATISTIC H-SPHEME PROCESS B/S: Performed by: PHYSICIAN ASSISTANT

## 2020-10-01 PROCEDURE — 38505 NEEDLE BIOPSY LYMPH NODES: CPT

## 2020-10-01 PROCEDURE — 999N001137 HC STATISTIC FLOW >15 ABY TC 88189: Performed by: PHYSICIAN ASSISTANT

## 2020-10-01 PROCEDURE — 07DR3ZX EXTRACTION OF ILIAC BONE MARROW, PERCUTANEOUS APPROACH, DIAGNOSTIC: ICD-10-PCS | Performed by: PHYSICIAN ASSISTANT

## 2020-10-01 PROCEDURE — 250N000013 HC RX MED GY IP 250 OP 250 PS 637: Performed by: STUDENT IN AN ORGANIZED HEALTH CARE EDUCATION/TRAINING PROGRAM

## 2020-10-01 PROCEDURE — 206N000001 HC R&B BMT UMMC

## 2020-10-01 PROCEDURE — 999N001126 HC STATISTIC BONE MARROW INTERP TC 85097: Performed by: PHYSICIAN ASSISTANT

## 2020-10-01 PROCEDURE — 07DH3ZX EXTRACTION OF RIGHT INGUINAL LYMPHATIC, PERCUTANEOUS APPROACH, DIAGNOSTIC: ICD-10-PCS | Performed by: RADIOLOGY

## 2020-10-01 PROCEDURE — 250N000011 HC RX IP 250 OP 636: Performed by: PHYSICIAN ASSISTANT

## 2020-10-01 PROCEDURE — 88189 FLOWCYTOMETRY/READ 16 & >: CPT | Performed by: PATHOLOGY

## 2020-10-01 PROCEDURE — 88305 TISSUE EXAM BY PATHOLOGIST: CPT | Mod: 26 | Performed by: PATHOLOGY

## 2020-10-01 PROCEDURE — 88161 CYTOPATH SMEAR OTHER SOURCE: CPT | Mod: TC | Performed by: PHYSICIAN ASSISTANT

## 2020-10-01 PROCEDURE — 999N001136 HC STATISTIC FLOW INT 9-15 ABY TC 88188: Performed by: PHYSICIAN ASSISTANT

## 2020-10-01 PROCEDURE — 88271 CYTOGENETICS DNA PROBE: CPT | Mod: TC | Performed by: PHYSICIAN ASSISTANT

## 2020-10-01 PROCEDURE — 88237 TISSUE CULTURE BONE MARROW: CPT | Mod: TC | Performed by: PHYSICIAN ASSISTANT

## 2020-10-01 PROCEDURE — 38222 DX BONE MARROW BX & ASPIR: CPT | Performed by: PHYSICIAN ASSISTANT

## 2020-10-01 PROCEDURE — 84100 ASSAY OF PHOSPHORUS: CPT | Performed by: INTERNAL MEDICINE

## 2020-10-01 PROCEDURE — 258N000003 HC RX IP 258 OP 636: Performed by: PHYSICIAN ASSISTANT

## 2020-10-01 PROCEDURE — 88341 IMHCHEM/IMCYTCHM EA ADD ANTB: CPT | Mod: 26 | Performed by: STUDENT IN AN ORGANIZED HEALTH CARE EDUCATION/TRAINING PROGRAM

## 2020-10-01 PROCEDURE — 88313 SPECIAL STAINS GROUP 2: CPT | Mod: TC | Performed by: PHYSICIAN ASSISTANT

## 2020-10-01 PROCEDURE — 88365 INSITU HYBRIDIZATION (FISH): CPT | Mod: TC | Performed by: PHYSICIAN ASSISTANT

## 2020-10-01 PROCEDURE — 88341 IMHCHEM/IMCYTCHM EA ADD ANTB: CPT | Mod: 26 | Performed by: PATHOLOGY

## 2020-10-01 PROCEDURE — 88311 DECALCIFY TISSUE: CPT | Mod: 26 | Performed by: STUDENT IN AN ORGANIZED HEALTH CARE EDUCATION/TRAINING PROGRAM

## 2020-10-01 PROCEDURE — 999N001086 HC STATISTIC MORPHOLOGY W/INTERP HEMEPATH TC 85060: Performed by: PHYSICIAN ASSISTANT

## 2020-10-01 PROCEDURE — 88365 INSITU HYBRIDIZATION (FISH): CPT | Mod: 26 | Performed by: PATHOLOGY

## 2020-10-01 PROCEDURE — A9585 GADOBUTROL INJECTION: HCPCS | Performed by: INTERNAL MEDICINE

## 2020-10-01 PROCEDURE — 272N000502 HC NEEDLE CR3

## 2020-10-01 PROCEDURE — 80053 COMPREHEN METABOLIC PANEL: CPT | Performed by: INTERNAL MEDICINE

## 2020-10-01 PROCEDURE — 88185 FLOWCYTOMETRY/TC ADD-ON: CPT | Mod: TC | Performed by: PHYSICIAN ASSISTANT

## 2020-10-01 PROCEDURE — 250N000013 HC RX MED GY IP 250 OP 250 PS 637: Performed by: INTERNAL MEDICINE

## 2020-10-01 PROCEDURE — 87449 NOS EACH ORGANISM AG IA: CPT | Performed by: PHYSICIAN ASSISTANT

## 2020-10-01 PROCEDURE — 88341 IMHCHEM/IMCYTCHM EA ADD ANTB: CPT | Mod: TC | Performed by: PHYSICIAN ASSISTANT

## 2020-10-01 PROCEDURE — 250N000009 HC RX 250: Performed by: PHYSICIAN ASSISTANT

## 2020-10-01 RX ORDER — OXYCODONE HYDROCHLORIDE 5 MG/1
5-10 TABLET ORAL EVERY 6 HOURS PRN
Status: DISCONTINUED | OUTPATIENT
Start: 2020-10-01 | End: 2020-10-05 | Stop reason: HOSPADM

## 2020-10-01 RX ORDER — OXYCODONE HYDROCHLORIDE 5 MG/1
5 TABLET ORAL EVERY 4 HOURS PRN
Status: DISCONTINUED | OUTPATIENT
Start: 2020-10-01 | End: 2020-10-01

## 2020-10-01 RX ORDER — MAGNESIUM SULFATE HEPTAHYDRATE 40 MG/ML
4 INJECTION, SOLUTION INTRAVENOUS EVERY 4 HOURS PRN
Status: DISCONTINUED | OUTPATIENT
Start: 2020-10-01 | End: 2020-10-05 | Stop reason: HOSPADM

## 2020-10-01 RX ORDER — POTASSIUM CL/LIDO/0.9 % NACL 10MEQ/0.1L
10 INTRAVENOUS SOLUTION, PIGGYBACK (ML) INTRAVENOUS
Status: DISCONTINUED | OUTPATIENT
Start: 2020-10-01 | End: 2020-10-05 | Stop reason: HOSPADM

## 2020-10-01 RX ORDER — POTASSIUM CHLORIDE 7.45 MG/ML
10 INJECTION INTRAVENOUS
Status: DISCONTINUED | OUTPATIENT
Start: 2020-10-01 | End: 2020-10-05 | Stop reason: HOSPADM

## 2020-10-01 RX ORDER — POTASSIUM CHLORIDE 1.5 G/1.58G
20-40 POWDER, FOR SOLUTION ORAL
Status: DISCONTINUED | OUTPATIENT
Start: 2020-10-01 | End: 2020-10-05 | Stop reason: HOSPADM

## 2020-10-01 RX ORDER — AMOXICILLIN 250 MG
1-2 CAPSULE ORAL 2 TIMES DAILY PRN
Status: DISCONTINUED | OUTPATIENT
Start: 2020-10-01 | End: 2020-10-05 | Stop reason: HOSPADM

## 2020-10-01 RX ORDER — DEXTROSE MONOHYDRATE 25 G/50ML
25-50 INJECTION, SOLUTION INTRAVENOUS
Status: DISCONTINUED | OUTPATIENT
Start: 2020-10-01 | End: 2020-10-05 | Stop reason: HOSPADM

## 2020-10-01 RX ORDER — POTASSIUM CHLORIDE 29.8 MG/ML
20 INJECTION INTRAVENOUS
Status: DISCONTINUED | OUTPATIENT
Start: 2020-10-01 | End: 2020-10-05 | Stop reason: HOSPADM

## 2020-10-01 RX ORDER — OXYCODONE HYDROCHLORIDE 5 MG/1
5-10 TABLET ORAL ONCE
Status: COMPLETED | OUTPATIENT
Start: 2020-10-01 | End: 2020-10-01

## 2020-10-01 RX ORDER — GADOBUTROL 604.72 MG/ML
10 INJECTION INTRAVENOUS ONCE
Status: COMPLETED | OUTPATIENT
Start: 2020-10-01 | End: 2020-10-01

## 2020-10-01 RX ORDER — LORAZEPAM 0.5 MG/1
.5-1 TABLET ORAL EVERY 6 HOURS PRN
Status: DISCONTINUED | OUTPATIENT
Start: 2020-10-01 | End: 2020-10-05 | Stop reason: HOSPADM

## 2020-10-01 RX ORDER — LORAZEPAM 2 MG/ML
.5-1 INJECTION INTRAMUSCULAR EVERY 6 HOURS PRN
Status: DISCONTINUED | OUTPATIENT
Start: 2020-10-01 | End: 2020-10-05 | Stop reason: HOSPADM

## 2020-10-01 RX ORDER — NICOTINE POLACRILEX 4 MG
15-30 LOZENGE BUCCAL
Status: DISCONTINUED | OUTPATIENT
Start: 2020-10-01 | End: 2020-10-05 | Stop reason: HOSPADM

## 2020-10-01 RX ORDER — OXYCODONE HYDROCHLORIDE 5 MG/1
5 TABLET ORAL ONCE
Status: COMPLETED | OUTPATIENT
Start: 2020-10-01 | End: 2020-10-01

## 2020-10-01 RX ORDER — POTASSIUM CHLORIDE 750 MG/1
20-40 TABLET, EXTENDED RELEASE ORAL
Status: DISCONTINUED | OUTPATIENT
Start: 2020-10-01 | End: 2020-10-05 | Stop reason: HOSPADM

## 2020-10-01 RX ADMIN — SODIUM CHLORIDE: 9 INJECTION, SOLUTION INTRAVENOUS at 05:00

## 2020-10-01 RX ADMIN — ACETAMINOPHEN 650 MG: 325 TABLET, FILM COATED ORAL at 05:11

## 2020-10-01 RX ADMIN — ACYCLOVIR 400 MG: 400 TABLET ORAL at 12:04

## 2020-10-01 RX ADMIN — MIDAZOLAM 2 MG: 1 INJECTION INTRAMUSCULAR; INTRAVENOUS at 13:12

## 2020-10-01 RX ADMIN — ACETAMINOPHEN 650 MG: 325 TABLET, FILM COATED ORAL at 15:50

## 2020-10-01 RX ADMIN — CEFEPIME HYDROCHLORIDE 2 G: 2 INJECTION, POWDER, FOR SOLUTION INTRAVENOUS at 14:19

## 2020-10-01 RX ADMIN — OXYCODONE HYDROCHLORIDE 5 MG: 5 TABLET ORAL at 21:19

## 2020-10-01 RX ADMIN — OXYCODONE HYDROCHLORIDE 5 MG: 5 TABLET ORAL at 13:02

## 2020-10-01 RX ADMIN — PROCHLORPERAZINE MALEATE 5 MG: 5 TABLET ORAL at 08:25

## 2020-10-01 RX ADMIN — CEFEPIME HYDROCHLORIDE 2 G: 2 INJECTION, POWDER, FOR SOLUTION INTRAVENOUS at 22:41

## 2020-10-01 RX ADMIN — LORAZEPAM 0.5 MG: 2 INJECTION INTRAMUSCULAR; INTRAVENOUS at 04:59

## 2020-10-01 RX ADMIN — GADOBUTROL 10 ML: 604.72 INJECTION INTRAVENOUS at 16:59

## 2020-10-01 RX ADMIN — SODIUM PHOSPHATE, MONOBASIC, MONOHYDRATE AND SODIUM PHOSPHATE, DIBASIC, ANHYDROUS 15 MMOL: 276; 142 INJECTION, SOLUTION INTRAVENOUS at 18:55

## 2020-10-01 RX ADMIN — ACYCLOVIR 400 MG: 400 TABLET ORAL at 20:30

## 2020-10-01 RX ADMIN — OMEPRAZOLE 20 MG: 20 CAPSULE, DELAYED RELEASE ORAL at 08:25

## 2020-10-01 RX ADMIN — TOLTERODINE TARTRATE 4 MG: 4 CAPSULE, EXTENDED RELEASE ORAL at 12:04

## 2020-10-01 RX ADMIN — OXYCODONE HYDROCHLORIDE 5 MG: 5 TABLET ORAL at 17:44

## 2020-10-01 RX ADMIN — VANCOMYCIN HYDROCHLORIDE 1750 MG: 10 INJECTION, POWDER, LYOPHILIZED, FOR SOLUTION INTRAVENOUS at 04:50

## 2020-10-01 RX ADMIN — VANCOMYCIN HYDROCHLORIDE 1750 MG: 10 INJECTION, POWDER, LYOPHILIZED, FOR SOLUTION INTRAVENOUS at 16:09

## 2020-10-01 RX ADMIN — PROCHLORPERAZINE MALEATE 5 MG: 5 TABLET ORAL at 22:41

## 2020-10-01 RX ADMIN — PSYLLIUM HUSK 1 PACKET: 3.4 POWDER ORAL at 12:05

## 2020-10-01 RX ADMIN — PROCHLORPERAZINE MALEATE 5 MG: 5 TABLET ORAL at 16:14

## 2020-10-01 RX ADMIN — FLUCONAZOLE 100 MG: 100 TABLET ORAL at 12:05

## 2020-10-01 RX ADMIN — PROCHLORPERAZINE MALEATE 5 MG: 5 TABLET ORAL at 12:04

## 2020-10-01 RX ADMIN — CEFEPIME HYDROCHLORIDE 2 G: 2 INJECTION, POWDER, FOR SOLUTION INTRAVENOUS at 06:53

## 2020-10-01 RX ADMIN — ACETAMINOPHEN 650 MG: 325 TABLET, FILM COATED ORAL at 23:59

## 2020-10-01 RX ADMIN — MAGNESIUM SULFATE IN WATER 4 G: 40 INJECTION, SOLUTION INTRAVENOUS at 16:09

## 2020-10-01 RX ADMIN — HEPARIN SODIUM 5000 UNITS: 5000 INJECTION, SOLUTION INTRAVENOUS; SUBCUTANEOUS at 05:11

## 2020-10-01 ASSESSMENT — ACTIVITIES OF DAILY LIVING (ADL)
ADLS_ACUITY_SCORE: 10

## 2020-10-01 ASSESSMENT — PAIN DESCRIPTION - DESCRIPTORS
DESCRIPTORS: ACHING;DISCOMFORT
DESCRIPTORS: ACHING;DISCOMFORT
DESCRIPTORS: ACHING

## 2020-10-01 NOTE — PHARMACY-VANCOMYCIN DOSING SERVICE
Pharmacy Vancomycin Initial Note  Date of Service 2020  Patient's  1965  55 year old, male  Actual Body Weight: 110 kg    Indication: Skin and Soft Tissue Infection, Fever    Current estimated CrCl = Estimated Creatinine Clearance: 79.1 mL/min (based on SCr of 1.24 mg/dL).    Creatinine for last 3 days  2020: 11:23 AM Creatinine 1.28 mg/dL  2020: 12:03 AM Creatinine 1.37 mg/dL;  2:53 PM Creatinine 1.24 mg/dL    Recent Vancomycin Level(s) for last 3 days  No results found for requested labs within last 72 hours.      Vancomycin IV Administrations (past 72 hours)                   vancomycin (VANCOCIN) 2,500 mg in sodium chloride 0.9 % 500 mL intermittent infusion (mg) 2,500 mg New Bag 20 0228                Nephrotoxins and other renal medications (From now, onward)    Start     Dose/Rate Route Frequency Ordered Stop    20  acyclovir (ZOVIRAX) tablet 400 mg      400 mg Oral 2 TIMES DAILY 20 1850            Contrast Orders - past 72 hours (72h ago, onward)    None              Plan:  1.  Start vancomycin 1750 mg IV q12h.   2.  Goal Trough Level: 10-15 mg/dL   3.  Pharmacy will check trough levels as appropriate in 1-3 Days.    4.  Serum creatinine levels will be ordered daily for the first week of therapy and at least twice weekly for subsequent weeks.      Megan Ferguson, PharmD  [phone] (text capable)

## 2020-10-01 NOTE — PROCEDURES
Bone Marrow Biopsy Procedure Note  Patient's Name: Juvenal Blake  Date of Procedure: 10/01/2020     PROCEDURE:  Unilateral bone marrow biopsy and unilateral aspirate      INDICATION:  Relapsed Lymphoma      PERFORMED BY:  Kassie Hinojosa PA-C     CONSENT:  Informed consent was obtained from the patient. The risks and benefits of the procedure were explained. The patient agreed to undergo the procedure. The consent form was signed and placed in the paper chart.      PREMEDICATION:  5mg Oxycodone  2mg Versed     PROCEDURE SUMMARY:  The patient's identification was positively verified by ID band. Patient was laid in the prone position. Premedication with 5mg Oxycodone, 2mg Versed. The right posterior iliac crest (RPIC) was prepped and draped in a sterile manner. The skin, deeper tissues, and periosteum of the RPIC were anesthetized with approximately 25mL 1% lidocaine. Following this, a 3mm incision was made. The trephine needle was advanced into the RPIC bone cavity and bone marrow aspirates were obtained from the RPIC and approximately 15ml of marrow were aspirated. Next, a 25mm core biopsy was obtained.  Following the procedure, a sterile pressure dressing was applied to the bone marrow biopsy site.      COMPLICATIONS:  None. The patient tolerated the procedure very well with minimal discomfort.      RECOMMENDATIONS:  The patient was placed in the supine position to maintain pressure on the biopsy site.   The patient was instructed to lie flat for 45-60 minutes and not to remove the dressing or get it wet for 24 hours post-procedure.      TESTS ORDERED:  Morphology  Flow cytometry  Chromosomes  FISH   Molecular (hold)    Kassie Hinojosa PA-C  Hematology/Oncology  Pager: [phone]

## 2020-10-01 NOTE — PLAN OF CARE
Temp max 102.8, OVSS on room air. Next blood cultures after 2030 tonight, providers would like a peripheral culture and line culture with next blood cultures. Pt denies nausea this shift, on scheduled compazine. Eating well. Right inguinal biopsy completed this morning, site CDI.  BMBx done this afternoon, dressing CDI. MRI brain completed this afternoon. Pt complains of abdominal pain and low back pain. Given oxy x2 with good relief. 4g Mg replaced, 15 mmol of phos still needed. Independent in room and cooperative of cares.     Problem: Adult Inpatient Plan of Care  Goal: Plan of Care Review  Outcome: No Change  Flowsheets (Taken 10/1/2020 1806)  Plan of Care Reviewed With:   patient   spouse  Progress: no change  Goal: Absence of Hospital-Acquired Illness or Injury  Outcome: No Change  Intervention: Identify and Manage Fall Risk  Recent Flowsheet Documentation  Taken 10/1/2020 0900 by Amanda Beasley RN  Safety Promotion/Fall Prevention:   activity supervised   fall prevention program maintained   lighting adjusted   mobility aid in reach  Goal: Optimal Comfort and Wellbeing  Outcome: Declining  Intervention: Provide Person-Centered Care  Recent Flowsheet Documentation  Taken 10/1/2020 0900 by Amanda Beasley, RN  Trust Relationship/Rapport:   care explained   choices provided   emotional support provided   empathic listening provided   questions answered   questions encouraged   reassurance provided   thoughts/feelings acknowledged     Problem: Nausea and Vomiting  Goal: Fluid and Electrolyte Balance  Outcome: Improving  Intervention: Prevent and Manage Nausea and Vomiting  Recent Flowsheet Documentation  Taken 10/1/2020 0900 by Amanda Beasley, RN  Nausea/Vomiting Interventions:   antiemetic   slow deep breathing encouraged     Problem: Infection  Goal: Infection Symptom Resolution  Outcome: No Change  Intervention: Prevent or Manage Infection  Recent Flowsheet Documentation  Taken 10/1/2020 0900 by Amanda Beasley  RN  Isolation Precautions: droplet precautions maintained

## 2020-10-01 NOTE — PROGRESS NOTES
[id number]: Study Visit Note   Subject name: Juvenal Blake     Visit: D28    Did the study visit occur within the appropriate window allowed by the protocol? yes    Since the last study visit, he has not been feeling well. He was admitted yesterday for strep throat infection and concern for bacteremia. His PET showed disease progression so he will be off study. We will continue to review his chart for survival and initiation of new therapy.     KPS=80    I have personally interviewed Juvenal Blake and reviewed his medical record for adverse events and concomitant medications and these have been recorded on the corresponding logs in Juvenal Blake's research file.     Juvenal Blake was given the opportunity to ask any trial related questions.  Please see provider progress note for physical exam and other clinical information. Labs were reviewed - any significant lab values were addressed and reviewed.    Juvenal was assessed for the following targeted toxicities:    Infusion related reaction: 0  Dyspnea: 1  Hypoxia: 0  Fever: 2  Chills: 1  Hypertension: 0  Hypotension: 0  Edema: 0  Pneumonitis: 0  Rash: 2    Estrella Solomon RN

## 2020-10-01 NOTE — PROGRESS NOTES
"      September 15, 2020     Due to COVID-19, every effort has been made by the research team at the [facility] Trials [facility] to limit in-person patient contact to protect both patients and the [facility] medical community. After reviewing the medical record of Juvenal Blake it was determined the research coordinators presence at the visit was not essential to the \"safe\" delivery of patient care. The provider caring for the patient was contacted by page and notified of needs for the visit.    [id number]: Study Visit Note   Subject name: Juvenal Blake     Visit: D14    Did the study visit occur within the appropriate window allowed by the protocol? yes    Since the last study visit, he has been doing about the same. There are no new concerns.     I reviewed his medical record for adverse events and concomitant medications and these have been recorded on the corresponding logs in Juvenal Blake's research file.     Juvenal Blake was given the opportunity to ask any trial related questions.  Please see provider progress note for physical exam and other clinical information. Labs were reviewed - any significant lab values were addressed and reviewed.    Juvenal was assessed for the following targeted toxicities:    Infusion related reaction: 0  Dyspnea: 1  Hypoxia: 0  Fever: 0  Chills: 0  Hypertension: 1  Hypotension: 0  Edema: 0  Pneumonitis: 0  Rash: 0    Estrella Soloomn RN  "

## 2020-10-01 NOTE — PROGRESS NOTES
Patient admitted to: 5C  Admitted from: Clinic  Arrived by: N/A  Reason for admission: Fever  Patient accompanied by: N/A  Belongings: Cellphone, , personal clothing. With patient  Teaching: Orientation to room, staff, meds  Skin double check completed by: Sushila Gaspar RN and Isaac Ng

## 2020-10-01 NOTE — CONSULTS
Patient is on IR schedule today Thursday 10/1/202 for an US guided biopsy of an enlarged right lymph node, hypermetabolic on PET.     Juvenal Blake is a 54 year old male with follicular lymphoma s/p NAM NK cell therapy. Today is Day+29.  He presents to clinic today after developing fevers to 103F, sweats and worsening abdominal pain on 9/29.     His PET CT on 9/29 demonstrated rapid progression of lymphoma compared to 7/2020. We would have expected some resolution of LAD.  He will need repeat biopsy via IR of lymphoma to evaluate for transformation of lymphoma given progression through LD chemotherapy earlier this month.     -Labs WNL for procedure.  Subcutaneous Heparin given at 5am is not an issue with the superficial biopsy.    -No NPO required. Local only.   -Consent will be done prior to procedure.     Please contact the IR department for estimated time of procedure.     Discussed with Joyce GUZMAN [id number] today.    Leticia [facility] CNP Interventional Radiology ([phone])

## 2020-10-01 NOTE — PROGRESS NOTES
[facility]     Hematology / Oncology Progress Note    Date of Service (when I saw the patient): 10/01/2020     Assessment & Plan   Juvenal Blake is a 55 year old male admitted on 9/30/2020. He is a 54 year old male with PMH of follicular lymphoma who was admitted from clinic due to 2 days of fevers and chills.      Today:  - IR-guided R inguinal lymph node biopsy for evaluation of lymphoma transformation - path, flow, FISH pending.  - BMBx completed due to worsening pancytopenia, concern for marrow involvement as above - pending.  - Continue Cefepime/Vancomycin. Monitor BCx - NGTD.  - Added on 1,3 Beta Glucan Fungitell, Aspergillus, CMV today.     ID  # Sepsis due to ?strep pharyngitis vs cellulitis  # Concern for bacteremia  Seen in ED (9/29) for abdominal pain, fevers (103) - rapid strep positive, given 1 dose PCN G, Ceftriaxone and discharged home. Seen in clinic (9/30) with persistent fevers, chills, mild nausea and inguinal lymphadenopathy. Given one dose of Cefepime in clinic, then admitted due to concern for bacteremia with workup as below. Stable dry cough over past 1-2 months.   - COVID (9/15, 9/29) NGTD.    - UA (9/29) unremarkable.  - CXR (9/29) unremarkable.  - BCx (9/30 0000, 9/30 2330) peripheral, port NGTD.   - Continue Cefepime/Vancomycin (9/30 - X)  - Added on 1,3 Beta Glucan Fungitell, Aspergillus, CMV today.   - Throat culture in process.  - Sputum culture to be collected.   - APAP PRN.     # Infectious Prophylaxis  - Continue Acyclovir.  - Continue Fluconazole.  - Continue Bactrim Monday, Tuesday BID.      HEME/ONC  # Follicular lymphoma s/p NAM-NK cell therapy (9/1), concern for relapse vs transformation  Follows with Dr. Chan. Initially diagnosed with grade I-II follicular lymphoma in 2010, stage IV at diagnosis with marrow involvement.   - 2010 - Bendamustine/Rituxan, achieved CR.   - 2010 - 2012 - Maintenance Rituxan.   - 12/2016 - Relapsed,  again with low-grade follicular lymphoma. Treated with Bendamustine/Rituxan, achieved CR.   - 2017 - 4/2019 -  Maintenance Rituxan  - 1/19 - Relapsed, again with low-grade follicular lymphoma. Treated with O-CHOP, achieved CR.  - 4/20 - Imaging with concerning adenopathy. Axillary node biopsy positive for grade I-II follicular lymphoma, expressed CD19/CD20. Ki-67 20%.  - 7/20 - BMBx with inimal histologic evidence of paratrabecular low grade lymphoma (comprising less than 5% of overall marrow cellularity). 0.6% CD10-positive kappa monotypic B cells, negative for IGH-BCL2 fusion.  - 9/1/20 - Day 0 NAM-NK cell therapy. Today is Day +30.   - 9/28/20 - PET/CT with overall increased hypermetabolic adenopathy in the abdomen and pelvis, as well as increased lymphomatous infiltration of soft tissue in the central mesentery. Increased splenomegaly, new/worsening hypermetabolic deposits in the retroperitoneal fat. Concern for rapid progression of lymphoma.   - S/p IR-guided biopsy of right inguinal lymph node (10/1) - path, flow, FISH pending.  - BMBx (10/1) due to worsening pancytopenia with concern for marrow involvement as above - Morphology, Flow, Cytogenetics, FISH pending.   - Brain MRI (10/1) to rule out CNS involvement given recent, worsening headaches.      # Pancytopenia  Secondary to chemotherapy, malignancy with concern for lymphomatous marrow involvement. Not currently neutropenic, however counts have been worsening over past week. Hgb 9.3, plts 61, WBC 1.5 (ANC 1.4) today.  - Transfuse for Hgb >7, Plt >10  - BMBx today - results pending.     # History of prostate cancer  S/p radical prostatectomy with positive margins. Completed radiation 5/2018.  - Continue Tolterodine    # Hx of hypogammaglobulinemia  Per notes, reported history of receiving IgG.   - Could consider recheck.     DERM  # Livedo reticularis  Diffuse, most prominent on abdomen and back. No erythema, no concern for infection.   - Continue to  monitor.      GI  # Nausea  Ongoing after chemotherapy. Allergy to Zofran.  - Continue scheduled Compazine TID.  - Continue Ativan, Compazine PRN.     # Constipation  Recent stools have been harder over the past 3 days. Small, hard stools. No pain.   - Continue PTA Psyllium.  - Add Senna 1-2 tablets BID PRN.     Diet: Regular  DVT Prophylaxis: Heparin subcutaneous, HELD for IR biopsy today  Code Status: Full    Patient seen and plan of care discussed with attending physician, Dr. Washington.    Kassie Hinojosa PA-C   Hematology/Oncology  # [id number]     Interval History   Nursing notes reviewed. Patient states this morning he is feeling overall okay. Denies fevers this morning, is having intermittent chills. He has had a dry cough over the past 1-2 months which is stable. Does note new headaches over the past 1-2 months which happen every 1-2 days. Pain controlled on Tylenol. No other associated neurological symptoms. He denies any current throat pain, stating that even when he was in the ED two days ago, this was not sore. Does note some fullness on the left side of his throat. States his abdominal pain is okay, however does have intermittent significant soreness of his right groin region. This comes and goes and is also associated with fairly significant abdominal fullness. Bowel movements over the past three days have been small and very hard. Discussed increasing his bowel regimen today. Mild tenderness of the right aspect of his port, however no surrounding erythema. Denies chest pain, shortness of breath, rhinorrhea. Discussed plan for workup with patient and his wife at bedside.     Physical Exam   Temp: 98.9  F (37.2  C) Temp src: Oral BP: 128/64 Pulse: 80   Resp: 16 SpO2: 98 % O2 Device: None (Room air)    Vitals:    10/01/20 0821   Weight: 107.4 kg (236 lb 12.8 oz)     Vital Signs with Ranges  Temp:  [98.1  F (36.7  C)-102.8  F (39.3  C)] 98.9  F (37.2  C)  Pulse:  [74-89] 80  Resp:  [16-18] 16  BP:  (106-128)/(62-78) 128/64  SpO2:  [95 %-100 %] 98 %  I/O last 3 completed shifts:  In: 840 [P.O.:240; I.V.:600]  Out: 2200 [Urine:2200]  General: Pleasant male sitting up in bed in no acute distress  Eyes: JOE, sclera anicteric   Nose/Mouth/Throat: Oropharynx with mild exudate, buccal mucosa moist, no ulcerations   Lungs: CTA bilaterally. No increased work of breathing. Breathing comfortably on room air  Cardiovascular: RRR, no M/R/G   Abdominal/Rectal: Obese, +BS, soft, distended, no guarding or rebound, no palpable masses  Lymphatics: Large palpable R inguinal LN, mildly tender to palpation. No palpable cervical or axillary LAD.   Skin: diffuse livedo reticularis on anterior abdomen and L flank.  No other rashes or wounds noted on examined areas of skin.   Neuro: A&O - CN II-XII are intact, non focal, no tremor  Access: R port-a-cath accessed with no surrounding erythema, non-tender    Medications     - MEDICATION INSTRUCTIONS -       sodium chloride 125 mL/hr at 10/01/20 0500       acyclovir  400 mg Oral BID     ceFEPIme (MAXIPIME) IV  2 g Intravenous Q8H     fluconazole  100 mg Oral Daily     [Held by provider] heparin ANTICOAGULANT  5,000 Units Subcutaneous Q8H     omeprazole  20 mg Oral Daily     prochlorperazine  5 mg Oral 4x Daily AC & HS     psyllium  1 packet Oral Daily     [START ON 10/5/2020] sulfamethoxazole-trimethoprim  1 tablet Oral Q Mon Tues BID     tolterodine ER  4 mg Oral Daily     vancomycin (VANCOCIN) IV  1,750 mg (central catheter) Intravenous Q12H     Data   Results for orders placed or performed during the hospital encounter of 09/30/20 (from the past 24 hour(s))   XR Chest 2 Views    Narrative    EXAM: XR CHEST 2 VW  9/30/2020 7:09 PM     HISTORY:  fever in patient on chemo for follicular lymphoma       COMPARISON:  Chest x-ray 9/29/2020    FINDINGS: PA and lateral radiographs of the chest. Right chest wall  Port-A-Cath with the tip overlying the mid/lower SVC. Stable  cardiomediastinal  silhouette. The pulmonary vasculature is distinct.  No pneumothorax or pleural effusion. No focal airspace opacity. The  visualized upper abdomen is unremarkable.      Impression    IMPRESSION: No focal airspace disease.    I have personally reviewed the examination and initial interpretation  and I agree with the findings.    GRACIELA RAMIRES MD   Blood culture    Specimen: Central Venous Line; Blood    Portacath   Result Value Ref Range    Specimen Description Blood Portacath     Culture Micro No growth after 8 hours    Sputum Culture Aerobic Bacterial    Specimen: Sputum   Result Value Ref Range    Specimen Description Sputum     Culture Micro (A)      Canceled, Test credited  >10 Squamous epithelial cells/low power field indicates oral contamination. Please   recollect.      Culture Micro       Notification of test cancellation was given to  Sushila Gaspar RN on 10/1/2020 at 0253. LSA      Gram stain    Specimen: Sputum   Result Value Ref Range    Specimen Description Sputum     Special Requests SCREEN     Gram Stain (A)      >10 Squamous epithelial cells/low power field indicates oral contamination. Please   recollect.      Gram Stain <25 PMNs/low power field     Gram Stain Moderate  Mixed gram negative and positive sarwat      Lactic acid whole blood   Result Value Ref Range    Lactic Acid 1.1 0.7 - 2.0 mmol/L   Blood culture    Specimen: Blood   Result Value Ref Range    Specimen Description Blood     Special Requests Left Hand     Culture Micro No growth after 8 hours    CBC with platelets differential   Result Value Ref Range    WBC 1.5 (L) 4.0 - 11.0 10e9/L    RBC Count 3.01 (L) 4.4 - 5.9 10e12/L    Hemoglobin 9.3 (L) 13.3 - 17.7 g/dL    Hematocrit 27.9 (L) 40.0 - 53.0 %    MCV 93 78 - 100 fl    MCH 30.9 26.5 - 33.0 pg    MCHC 33.3 31.5 - 36.5 g/dL    RDW 16.1 (H) 10.0 - 15.0 %    Platelet Count 61 (L) 150 - 450 10e9/L    Diff Method Manual Differential     % Neutrophils 95.7 %    % Lymphocytes 2.6 %     % Monocytes 1.7 %    % Eosinophils 0.0 %    % Basophils 0.0 %    Absolute Neutrophil 1.4 (L) 1.6 - 8.3 10e9/L    Absolute Lymphocytes 0.0 (L) 0.8 - 5.3 10e9/L    Absolute Monocytes 0.0 0.0 - 1.3 10e9/L    Absolute Eosinophils 0.0 0.0 - 0.7 10e9/L    Absolute Basophils 0.0 0.0 - 0.2 10e9/L    Anisocytosis Slight     Platelet Estimate Confirming automated cell count    Comprehensive metabolic panel   Result Value Ref Range    Sodium 137 133 - 144 mmol/L    Potassium 3.6 3.4 - 5.3 mmol/L    Chloride 104 94 - 109 mmol/L    Carbon Dioxide 27 20 - 32 mmol/L    Anion Gap 6 3 - 14 mmol/L    Glucose 118 (H) 70 - 99 mg/dL    Urea Nitrogen 10 7 - 30 mg/dL    Creatinine 1.30 (H) 0.66 - 1.25 mg/dL    GFR Estimate 61 >60 mL/min/[1.73_m2]    GFR Estimate If Black 70 >60 mL/min/[1.73_m2]    Calcium 7.8 (L) 8.5 - 10.1 mg/dL    Bilirubin Total 0.7 0.2 - 1.3 mg/dL    Albumin 3.1 (L) 3.4 - 5.0 g/dL    Protein Total 5.6 (L) 6.8 - 8.8 g/dL    Alkaline Phosphatase 70 40 - 150 U/L    ALT 40 0 - 70 U/L    AST 42 0 - 45 U/L   Magnesium   Result Value Ref Range    Magnesium 1.6 1.6 - 2.3 mg/dL   Phosphorus   Result Value Ref Range    Phosphorus 2.0 (L) 2.5 - 4.5 mg/dL   Interventional Radiology Adult/Peds IP Consult: Patient to be seen: Routine within 24 hours; Call back #: [phone] // #[phone]; Hx of lymphoma s/p NK cells, need for IR guided biopsy to evaluate for transformation of lymphoma; Requesting provider? Othe...    Nevin Jeter, ALESSANDRO CNP     10/1/2020  9:14 AM  Patient is on IR schedule today Thursday 10/1/202 for an US   guided biopsy of an enlarged right lymph node, hypermetabolic on   PET.     Juvenal Blake is a 54 year old male with follicular lymphoma   s/p NAM NK cell therapy. Today is Day+29.  He presents to clinic   today after developing fevers to 103F, sweats and worsening   abdominal pain on 9/29.     His PET CT on 9/29 demonstrated rapid progression of lymphoma   compared to 7/2020. We would have  expected some resolution of   LAD.  He will need repeat biopsy via IR of lymphoma to evaluate   for transformation of lymphoma given progression through LD   chemotherapy earlier this month.     -Labs WNL for procedure.  Subcutaneous Heparin given at 5am is   not an issue with the superficial biopsy.    -No NPO required. Local only.   -Consent will be done prior to procedure.     Please contact the IR department for estimated time of procedure.       Discussed with Joyce GUZMAN [phone] today.    Leticia Leitschuh CNP Interventional Radiology ([phone])        FISH: Laboratory Miscellaneous Order   Result Value Ref Range    Miscellaneous Test         Specimen Received, Reordered and sent to Performing laboratory - Report to follow upon   completion.     IR Lymph Node Biopsy    Narrative    Procedures:   1. Ultrasound-guided right groin lymph node biopsy.    Clinical indication: This is a patient with follicular lymphoma.    Comparison studies: PET CT from July 25, 2020.    PROCEDURE:        Interventional radiologists: WING Martinez MD (IR staff)    Consent: verbal and written informed consent obtained prior to  procedure.    Procedure details: Patient placed in supine position. The right groin  was prepped and draped in standard sterile fashion. Using ultrasound  guidance, a 17-gauge coaxial needle was advanced into one of the lymph  nodes. 6 18-gauge biopsies were taken and placed in formalin, RPMI,  and fresh saline. Pathology presence was requested, however, they  refused.      Medications:1% lidocaine (buffered with 8.4% sodium bicarbonate) for  local anesthesia.     Monitoring: The patient was placed on continuous monitoring. Vital  signs and sedation monitored by nursing staff under my supervision.  The patient remained stable throughout the procedure.    Complications: None.      Impression    IMPRESSION:   Successful right groin lymph node biopsy as above.    PLAN:  Pathology pending.    RAI COWART MD, attest  that I was present in the procedure room  for the entire procedure.    HERSON MARTINEZ MD   Image Guided Right Inguinal Lymph Node Biopsy    Vern Mayes MD     10/1/2020 10:45 AM  [facility]     Procedure: Image Guided Right Inguinal Lymph Node Biopsy    Date/Time: 10/1/2020 10:42 AM  Performed by: Herson Martinez MD  Authorized by: Herson Martinez MD   IR Fellow Physician:  Radiology Resident Physician: Dr. Vern Tovar      UNIVERSAL PROTOCOL   Site Marked: NA  Prior Images Obtained and Reviewed:  Yes  Required items: Required blood products, implants, devices and special   equipment available    Patient identity confirmed:  Verbally with patient, arm band, provided   demographic data and hospital-assigned identification number  Patient was reevaluated immediately before administering moderate or deep   sedation or anesthesia  Confirmation Checklist:  Patient's identity using two indicators, relevant   allergies, procedure was appropriate and matched the consent or emergent   situation and correct equipment/implants were available  Time out: Immediately prior to the procedure a time out was called    Universal Protocol: the Joint Commission Universal Protocol was followed    Preparation: Patient was prepped and draped in usual sterile fashion           ANESTHESIA    Anesthesia: Local infiltration  Local Anesthetic:  Lidocaine 1% without epinephrine      SEDATION    Patient Sedated: No    See dictated procedure note for full details.  Findings: 18g core needle biopsies x6 obtained from right inguinal lymph   nodes. No complications encountered.    Specimens: none    Complications: None    Condition: Stable    PROCEDURE   Patient Tolerance:  Patient tolerated the procedure well with no immediate   complications    Length of time physician/provider present for 1:1 monitoring during   sedation: 0

## 2020-10-01 NOTE — PLAN OF CARE
/69 (BP Location: Left arm)   Pulse 77   Temp 100.1  F (37.8  C) (Oral)   Resp 16   SpO2 96%     Nrit=792.8, AOVSS on RA. C/o of pain on with back and abdomen, Tylenol PRN x2 given. Ativan x1 given for nausea. Adequate fluid intake and UOP. No replacements needed. Lactic acid of 1.1, blood cultures and throat swab sent to lab, pt still needs a sputum culture sent. Pt independent in room. Continue to monitor and follow POC.    Problem: Adult Inpatient Plan of Care  Goal: Plan of Care Review  10/1/2020 0614 by Sushila Gaspar, RN  Outcome: No Change  10/1/2020 0611 by Sushila Gaspar, RN  Outcome: No Change  Flowsheets (Taken 9/30/2020 2000)  Plan of Care Reviewed With: patient     Problem: Nausea and Vomiting  Goal: Fluid and Electrolyte Balance  Outcome: No Change

## 2020-10-01 NOTE — PROGRESS NOTES
[id number]: Study Visit Note   Subject name: Juvenal Blake     Visit: D21    Did the study visit occur within the appropriate window allowed by the protocol? yes    Since the last study visit, he has been doing about the same.     I did not attend this visit in person.    I have reviewed his medical record for adverse events and concomitant medications and these have been recorded on the corresponding logs in Juvenal Blake's research file.     Juvenal Blake was given the opportunity to ask any trial related questions.  Please see provider progress note for physical exam and other clinical information. Labs were reviewed - any significant lab values were addressed and reviewed.    Juvenal was assessed for the following targeted toxicities:    Infusion related reaction: 0  Dyspnea: 1  Hypoxia: 0  Fever: 0  Chills: 0  Hypertension: 1  Hypotension: 0  Edema: 0  Pneumonitis: 0  Rash: 0    Estrella Solomon RN

## 2020-10-01 NOTE — PROGRESS NOTES
"      September 8, 2020     Due to COVID-19, every effort has been made by the research team at the [facility] Clinical Trials Office to limit in-person patient contact to protect both patients and the [facility] medical community. After reviewing the medical record of Juvenal Blake it was determined the research coordinators presence at the visit was not essential to the \"safe\" delivery of patient care. The provider caring for the patient was contacted by page and notified of needs for the visit.    [id number]: Study Visit Note   Subject name: Juvenal Blake     Visit: Day 7    Did the study visit occur within the appropriate window allowed by the protocol? yes    Since the last study visit, he has been doing about the same. There are no new concerns today.     I have reviewed his medical record for adverse events and concomitant medications and these have been recorded on the corresponding logs in Juvenal Blake's research file.     Juvenal Blake was given the opportunity to ask any trial related questions.  Please see provider progress note for physical exam and other clinical information. Labs were reviewed - any significant lab values were addressed and reviewed.    Juvenal was assessed for the following targeted toxicities:    Infusion related reaction: 0  Dyspnea: 1  Hypoxia: 0  Fever: 0  Chills: 0  Hypertension: 0  Hypotension: 0  Edema: 0  Pneumonitis: 0  Rash: 0  Estrella Solomon RN    "

## 2020-10-01 NOTE — PROCEDURES
[facility]     Procedure: Image Guided Right Inguinal Lymph Node Biopsy    Date/Time: 10/1/2020 10:42 AM  Performed by: Herson Martinez MD  Authorized by: Herson Martinez MD   IR Fellow Physician:  Radiology Resident Physician: Dr. Vern Tovar      UNIVERSAL PROTOCOL   Site Marked: NA  Prior Images Obtained and Reviewed:  Yes  Required items: Required blood products, implants, devices and special equipment available    Patient identity confirmed:  Verbally with patient, arm band, provided demographic data and hospital-assigned identification number  Patient was reevaluated immediately before administering moderate or deep sedation or anesthesia  Confirmation Checklist:  Patient's identity using two indicators, relevant allergies, procedure was appropriate and matched the consent or emergent situation and correct equipment/implants were available  Time out: Immediately prior to the procedure a time out was called    Universal Protocol: the Joint Commission Universal Protocol was followed    Preparation: Patient was prepped and draped in usual sterile fashion           ANESTHESIA    Anesthesia: Local infiltration  Local Anesthetic:  Lidocaine 1% without epinephrine      SEDATION    Patient Sedated: No    See dictated procedure note for full details.  Findings: 18g core needle biopsies x6 obtained from right inguinal lymph nodes. No complications encountered.    Specimens: none    Complications: None    Condition: Stable    PROCEDURE   Patient Tolerance:  Patient tolerated the procedure well with no immediate complications    Length of time physician/provider present for 1:1 monitoring during sedation: 0

## 2020-10-01 NOTE — PROGRESS NOTES
Patient Name: Juvenal Blake  Medical Record Number: [id number]  Today's Date: 10/1/2020    Procedure: Lymph Node Biopsy  Proceduralist: Dr. Martinez    Procedure Start: 1030  Procedure end: 1040  Sedation medications administered: none     Report given to: Amanda PADILLA    Other Notes: Pt arrived to IR room 7 from . Consent reviewed. Pt denies any questions or concerns regarding procedure. Pt positioned supine and monitored per protocol. Pt tolerated procedure without any noted complications.    Lab called for direction on specimen collection.  2 cores for Lab Misc: FISH order placed in sterile cup with saline.  2 cores placed in RPMI and 2 cores in formalin.    Pt transferred back to .

## 2020-10-02 ENCOUNTER — APPOINTMENT (OUTPATIENT)
Dept: CARDIOLOGY | Facility: CLINIC | Age: 55
DRG: 871 | End: 2020-10-02
Attending: PHYSICIAN ASSISTANT
Payer: COMMERCIAL

## 2020-10-02 LAB
ALBUMIN SERPL-MCNC: 2.9 G/DL (ref 3.4–5)
ALP SERPL-CCNC: 89 U/L (ref 40–150)
ALT SERPL W P-5'-P-CCNC: 49 U/L (ref 0–70)
ANION GAP SERPL CALCULATED.3IONS-SCNC: 8 MMOL/L (ref 3–14)
ANISOCYTOSIS BLD QL SMEAR: SLIGHT
AST SERPL W P-5'-P-CCNC: 53 U/L (ref 0–45)
BACTERIA SPEC CULT: ABNORMAL
BACTERIA SPEC CULT: ABNORMAL
BASOPHILS # BLD AUTO: 0 10E9/L (ref 0–0.2)
BASOPHILS NFR BLD AUTO: 0.4 %
BILIRUB SERPL-MCNC: 0.7 MG/DL (ref 0.2–1.3)
BUN SERPL-MCNC: 9 MG/DL (ref 7–30)
CALCIUM SERPL-MCNC: 7.7 MG/DL (ref 8.5–10.1)
CHLORIDE SERPL-SCNC: 105 MMOL/L (ref 94–109)
CMV DNA SPEC NAA+PROBE-ACNC: NORMAL [IU]/ML
CMV DNA SPEC NAA+PROBE-LOG#: NORMAL {LOG_IU}/ML
CO2 SERPL-SCNC: 24 MMOL/L (ref 20–32)
COPATH REPORT: NORMAL
COPATH REPORT: NORMAL
CREAT SERPL-MCNC: 1.18 MG/DL (ref 0.66–1.25)
DIFFERENTIAL METHOD BLD: ABNORMAL
EOSINOPHIL # BLD AUTO: 0 10E9/L (ref 0–0.7)
EOSINOPHIL NFR BLD AUTO: 2.7 %
ERYTHROCYTE [DISTWIDTH] IN BLOOD BY AUTOMATED COUNT: 16.3 % (ref 10–15)
GFR SERPL CREATININE-BSD FRML MDRD: 68 ML/MIN/{1.73_M2}
GLUCOSE SERPL-MCNC: 113 MG/DL (ref 70–99)
GRAM STN SPEC: ABNORMAL
HCT VFR BLD AUTO: 26.3 % (ref 40–53)
HGB BLD-MCNC: 8.9 G/DL (ref 13.3–17.7)
IMM PLASMA CELLS NFR BLD: 0.4 %
INTERPRETATION ECG - MUSE: NORMAL
LDH SERPL L TO P-CCNC: 475 U/L (ref 85–227)
LYMPHOCYTES # BLD AUTO: 0 10E9/L (ref 0.8–5.3)
LYMPHOCYTES NFR BLD AUTO: 4.9 %
MAGNESIUM SERPL-MCNC: 2.4 MG/DL (ref 1.6–2.3)
MAGNESIUM SERPL-MCNC: 2.4 MG/DL (ref 1.6–2.3)
MCH RBC QN AUTO: 31.1 PG (ref 26.5–33)
MCHC RBC AUTO-ENTMCNC: 33.8 G/DL (ref 31.5–36.5)
MCV RBC AUTO: 92 FL (ref 78–100)
METAMYELOCYTES # BLD: 0 10E9/L
METAMYELOCYTES NFR BLD MANUAL: 0.4 %
MONOCYTES # BLD AUTO: 0 10E9/L (ref 0–1.3)
MONOCYTES NFR BLD AUTO: 6.2 %
NEUTROPHILS # BLD AUTO: 0.7 10E9/L (ref 1.6–8.3)
NEUTROPHILS NFR BLD AUTO: 85 %
OVALOCYTES BLD QL SMEAR: SLIGHT
PHOSPHATE SERPL-MCNC: 3.1 MG/DL (ref 2.5–4.5)
PLASMA CELLS # BLD MANUAL: 0 10E9/L
PLATELET # BLD AUTO: 58 10E9/L (ref 150–450)
POIKILOCYTOSIS BLD QL SMEAR: SLIGHT
POLYCHROMASIA BLD QL SMEAR: SLIGHT
POTASSIUM SERPL-SCNC: 3.6 MMOL/L (ref 3.4–5.3)
PROT SERPL-MCNC: 5.4 G/DL (ref 6.8–8.8)
RBC # BLD AUTO: 2.86 10E12/L (ref 4.4–5.9)
SODIUM SERPL-SCNC: 136 MMOL/L (ref 133–144)
SPECIMEN SOURCE: ABNORMAL
SPECIMEN SOURCE: ABNORMAL
SPECIMEN SOURCE: NORMAL
URATE SERPL-MCNC: 3.3 MG/DL (ref 3.5–7.2)
VANCOMYCIN SERPL-MCNC: 14.8 MG/L
WBC # BLD AUTO: 0.8 10E9/L (ref 4–11)

## 2020-10-02 PROCEDURE — 250N000013 HC RX MED GY IP 250 OP 250 PS 637: Performed by: STUDENT IN AN ORGANIZED HEALTH CARE EDUCATION/TRAINING PROGRAM

## 2020-10-02 PROCEDURE — 258N000003 HC RX IP 258 OP 636: Performed by: INTERNAL MEDICINE

## 2020-10-02 PROCEDURE — 36415 COLL VENOUS BLD VENIPUNCTURE: CPT | Performed by: INTERNAL MEDICINE

## 2020-10-02 PROCEDURE — 93321 DOPPLER ECHO F-UP/LMTD STD: CPT | Mod: 26 | Performed by: INTERNAL MEDICINE

## 2020-10-02 PROCEDURE — 93308 TTE F-UP OR LMTD: CPT | Mod: 26 | Performed by: INTERNAL MEDICINE

## 2020-10-02 PROCEDURE — 250N000013 HC RX MED GY IP 250 OP 250 PS 637: Performed by: INTERNAL MEDICINE

## 2020-10-02 PROCEDURE — 255N000002 HC RX 255 OP 636: Performed by: INTERNAL MEDICINE

## 2020-10-02 PROCEDURE — 206N000001 HC R&B BMT UMMC

## 2020-10-02 PROCEDURE — 250N000011 HC RX IP 250 OP 636: Performed by: STUDENT IN AN ORGANIZED HEALTH CARE EDUCATION/TRAINING PROGRAM

## 2020-10-02 PROCEDURE — 83735 ASSAY OF MAGNESIUM: CPT | Performed by: INTERNAL MEDICINE

## 2020-10-02 PROCEDURE — 80202 ASSAY OF VANCOMYCIN: CPT | Performed by: INTERNAL MEDICINE

## 2020-10-02 PROCEDURE — 87205 SMEAR GRAM STAIN: CPT | Performed by: PHYSICIAN ASSISTANT

## 2020-10-02 PROCEDURE — 87040 BLOOD CULTURE FOR BACTERIA: CPT | Performed by: INTERNAL MEDICINE

## 2020-10-02 PROCEDURE — 93308 TTE F-UP OR LMTD: CPT

## 2020-10-02 PROCEDURE — 250N000011 HC RX IP 250 OP 636: Performed by: INTERNAL MEDICINE

## 2020-10-02 PROCEDURE — 84550 ASSAY OF BLOOD/URIC ACID: CPT | Performed by: INTERNAL MEDICINE

## 2020-10-02 PROCEDURE — 80053 COMPREHEN METABOLIC PANEL: CPT | Performed by: INTERNAL MEDICINE

## 2020-10-02 PROCEDURE — 93325 DOPPLER ECHO COLOR FLOW MAPG: CPT | Mod: 26 | Performed by: INTERNAL MEDICINE

## 2020-10-02 PROCEDURE — 87070 CULTURE OTHR SPECIMN AEROBIC: CPT | Performed by: PHYSICIAN ASSISTANT

## 2020-10-02 PROCEDURE — 250N000013 HC RX MED GY IP 250 OP 250 PS 637: Performed by: PHYSICIAN ASSISTANT

## 2020-10-02 PROCEDURE — 84100 ASSAY OF PHOSPHORUS: CPT | Performed by: INTERNAL MEDICINE

## 2020-10-02 PROCEDURE — 93005 ELECTROCARDIOGRAM TRACING: CPT

## 2020-10-02 PROCEDURE — 93010 ELECTROCARDIOGRAM REPORT: CPT | Performed by: INTERNAL MEDICINE

## 2020-10-02 PROCEDURE — 83615 LACTATE (LD) (LDH) ENZYME: CPT | Performed by: INTERNAL MEDICINE

## 2020-10-02 PROCEDURE — 87040 BLOOD CULTURE FOR BACTERIA: CPT | Performed by: PHYSICIAN ASSISTANT

## 2020-10-02 PROCEDURE — 85025 COMPLETE CBC W/AUTO DIFF WBC: CPT | Performed by: INTERNAL MEDICINE

## 2020-10-02 RX ADMIN — PROCHLORPERAZINE MALEATE 5 MG: 5 TABLET ORAL at 17:15

## 2020-10-02 RX ADMIN — ACYCLOVIR 400 MG: 400 TABLET ORAL at 19:44

## 2020-10-02 RX ADMIN — ACETAMINOPHEN 650 MG: 325 TABLET, FILM COATED ORAL at 14:48

## 2020-10-02 RX ADMIN — HUMAN ALBUMIN MICROSPHERES AND PERFLUTREN 5 ML: 10; .22 INJECTION, SOLUTION INTRAVENOUS at 15:30

## 2020-10-02 RX ADMIN — CEFEPIME HYDROCHLORIDE 2 G: 2 INJECTION, POWDER, FOR SOLUTION INTRAVENOUS at 21:10

## 2020-10-02 RX ADMIN — FLUCONAZOLE 100 MG: 100 TABLET ORAL at 08:56

## 2020-10-02 RX ADMIN — CEFEPIME HYDROCHLORIDE 2 G: 2 INJECTION, POWDER, FOR SOLUTION INTRAVENOUS at 14:43

## 2020-10-02 RX ADMIN — TOLTERODINE TARTRATE 4 MG: 4 CAPSULE, EXTENDED RELEASE ORAL at 08:56

## 2020-10-02 RX ADMIN — OXYCODONE HYDROCHLORIDE 10 MG: 5 TABLET ORAL at 21:10

## 2020-10-02 RX ADMIN — PSYLLIUM HUSK 1 PACKET: 3.4 POWDER ORAL at 08:56

## 2020-10-02 RX ADMIN — PROCHLORPERAZINE MALEATE 5 MG: 5 TABLET ORAL at 12:25

## 2020-10-02 RX ADMIN — PROCHLORPERAZINE MALEATE 5 MG: 5 TABLET ORAL at 08:56

## 2020-10-02 RX ADMIN — OXYCODONE HYDROCHLORIDE 10 MG: 5 TABLET ORAL at 03:58

## 2020-10-02 RX ADMIN — VANCOMYCIN HYDROCHLORIDE 1750 MG: 10 INJECTION, POWDER, LYOPHILIZED, FOR SOLUTION INTRAVENOUS at 05:14

## 2020-10-02 RX ADMIN — CEFEPIME HYDROCHLORIDE 2 G: 2 INJECTION, POWDER, FOR SOLUTION INTRAVENOUS at 06:40

## 2020-10-02 RX ADMIN — ACYCLOVIR 400 MG: 400 TABLET ORAL at 08:56

## 2020-10-02 RX ADMIN — OXYCODONE HYDROCHLORIDE 10 MG: 5 TABLET ORAL at 14:48

## 2020-10-02 RX ADMIN — OMEPRAZOLE 20 MG: 20 CAPSULE, DELAYED RELEASE ORAL at 08:56

## 2020-10-02 RX ADMIN — PROCHLORPERAZINE MALEATE 5 MG: 5 TABLET ORAL at 21:10

## 2020-10-02 RX ADMIN — VANCOMYCIN HYDROCHLORIDE 1750 MG: 10 INJECTION, POWDER, LYOPHILIZED, FOR SOLUTION INTRAVENOUS at 17:15

## 2020-10-02 RX ADMIN — LORAZEPAM 0.5 MG: 0.5 TABLET ORAL at 11:18

## 2020-10-02 ASSESSMENT — ACTIVITIES OF DAILY LIVING (ADL)
ADLS_ACUITY_SCORE: 10

## 2020-10-02 ASSESSMENT — PAIN DESCRIPTION - DESCRIPTORS: DESCRIPTORS: ACHING;DISCOMFORT

## 2020-10-02 NOTE — PROGRESS NOTES
[facility]     Hematology / Oncology Progress Note    Date of Service (when I saw the patient): 10/02/2020     Assessment & Plan   Juvenal Blake is a 55 year old male admitted on 9/30/2020. He is a 54 year old male with PMH of follicular lymphoma who was admitted from clinic due to 2 days of fevers and chills.      Today:  - IR-guided R inguinal lymph node biopsy, BMBx - pending.  - Continue Cefepime/Vancomycin. Monitor BCx - NGTD.  - CMV undetectable. 1,3 Beta Glucan Fungitell, Aspergillus in process.   - EKG with concern for incomplete RBBB. Echo hyperkinetic, EF 65-70%.     ID  # Sepsis due to ?strep pharyngitis vs cellulitis  # Concern for bacteremia  Seen in ED (9/29) for abdominal pain, fevers (103) - rapid strep positive, given 1 dose PCN G, Ceftriaxone and discharged home. Seen in clinic (9/30) with persistent fevers, chills, mild nausea and inguinal lymphadenopathy. Given one dose of Cefepime in clinic, then admitted due to concern for bacteremia with workup as below. Stable dry cough over past 1-2 months.   - COVID (9/15, 9/29) NGTD.    - UA (9/29) unremarkable.  - CXR (9/29) unremarkable.  - BCx (9/30 0000, 9/30 2330) peripheral, port NGTD.   - Continue Cefepime/Vancomycin (9/30 - X).  - CMV undetectable.  - Added on 1,3 Beta Glucan Fungitell, Aspergillus, CMV today.   - Throat culture in process.  - Sputum culture in process.   - APAP PRN.     # Infectious Prophylaxis  - Continue Acyclovir.  - Continue Fluconazole.  - Continue Bactrim Monday, Tuesday BID.      HEME/ONC  # Follicular lymphoma s/p NAM-NK cell therapy (9/1), concern for relapse vs transformation  Follows with Dr. Chan. Initially diagnosed with grade I-II follicular lymphoma in 2010, stage IV at diagnosis with marrow involvement.   - 2010 - Bendamustine/Rituxan, achieved CR.   - 2010 - 2012 - Maintenance Rituxan.   - 12/2016 - Relapsed, again with low-grade follicular lymphoma. Treated with  Bendamustine/Rituxan, achieved CR.   - 2017 - 4/2019 -  Maintenance Rituxan  - 1/19 - Relapsed, again with low-grade follicular lymphoma. Treated with O-CHOP, achieved CR.  - 4/20 - Imaging with concerning adenopathy. Axillary node biopsy positive for grade I-II follicular lymphoma, expressed CD19/CD20. Ki-67 20%.  - 7/20 - BMBx with inimal histologic evidence of paratrabecular low grade lymphoma (comprising less than 5% of overall marrow cellularity). 0.6% CD10-positive kappa monotypic B cells, negative for IGH-BCL2 fusion.  - 9/1/20 - Day 0 NAM-NK cell therapy. Today is Day +30.   - 9/28/20 - PET/CT with overall increased hypermetabolic adenopathy in the abdomen and pelvis, as well as increased lymphomatous infiltration of soft tissue in the central mesentery. Increased splenomegaly, new/worsening hypermetabolic deposits in the retroperitoneal fat. Concern for rapid progression of lymphoma.   - S/p IR-guided biopsy of right inguinal lymph node (10/1) - path, flow, FISH pending.  - BMBx (10/1) due to worsening pancytopenia with concern for marrow involvement as above - Morphology, Flow, Cytogenetics, FISH pending.   - Brain MRI (10/1) negative for acute intracranial pathology, lymphoma involvement.      # Pancytopenia  Secondary to chemotherapy, malignancy with concern for lymphomatous marrow involvement. Not currently neutropenic, however counts have been worsening over past week. Hgb 9.3, plts 61, WBC 1.5 (ANC 1.4) today.  - Transfuse for Hgb >7, Plt >10  - BMBx results pending.     # History of prostate cancer  S/p radical prostatectomy with positive margins. Completed radiation 5/2018.  - Continue Tolterodine    # Hx of hypogammaglobulinemia  Per notes, reported history of receiving IgG.   - Could consider recheck.     CARDS  # Incomplete RBB   Noted on EKG 10/2. NSR with occasional PACs. QTc 425. Persistent dyspnea on exertion, recent Lexiscan stress test with no inducible myocardial ischemia/infarction.  -  Echo obtained 10/2. Hyperkinetic, EF 60-65%. No valvular abnormalities.     DERM  # Livedo reticularis  Diffuse, most prominent on abdomen and back. No erythema, no concern for infection.   - Continue to monitor.      GI  # Nausea  Ongoing after chemotherapy. Allergy to Zofran.  - Continue scheduled Compazine TID.  - Continue Ativan, Compazine PRN.     # Constipation, improving  Recent stools have been harder over the past 3 days on admission. Reports two softer BMs yesterday.  - Continue PTA Psyllium.  - Add Senna 1-2 tablets BID PRN.     Diet: Regular  DVT Prophylaxis: Heparin subcutaneous, HELD for IR biopsy today  Code Status: Full    Patient seen and plan of care discussed with attending physician, Dr. Washington.    Kassie Hinojosa PA-C   Hematology/Oncology  # 0812     Interval History   Nursing notes reviewed. Patient states this morning he is feeling overall okay. Denies fevers this morning, however does endorse intermittent chills. Dry cough over the past 1-2 months remains stable. Continues to endorse fullness of his abdomen with persistent abdominal/groin pain, not worsening. Controlled with oxycodone as needed. Did have two soft bowel movements yesterday. Does note recent history of hemorrhoids, no recent bleeding. No headaches, chest pain, shortness of breath, sore throat, rhinorrhea. Discussed we are still awaiting biopsy results with patient and his wife at bedside.     Physical Exam   Temp: 100  F (37.8  C) Temp src: Axillary BP: 100/60 Pulse: 88   Resp: 18 SpO2: 97 % O2 Device: None (Room air)    Vitals:    10/01/20 0821   Weight: 107.4 kg (236 lb 12.8 oz)     Vital Signs with Ranges  Temp:  [98.3  F (36.8  C)-103  F (39.4  C)] 100  F (37.8  C)  Pulse:  [] 88  Resp:  [16-18] 18  BP: (100-128)/(60-80) 100/60  SpO2:  [94 %-98 %] 97 %  I/O last 3 completed shifts:  In: 5247.92 [P.O.:1580; I.V.:3667.92]  Out: 2800 [Urine:2800]  General: Pleasant male sitting up in bed in no acute distress  Eyes:  JOE, sclera anicteric   Nose/Mouth/Throat: Oropharynx with mild exudate, buccal mucosa moist, no ulcerations   Lungs: CTA bilaterally. No increased work of breathing. Breathing comfortably on room air  Cardiovascular: RRR, no M/R/G   Abdominal/Rectal: Obese, +BS, soft, distended, no guarding or rebound, no palpable masses  Lymphatics: Large palpable R inguinal LN, mildly tender to palpation. No palpable cervical or axillary LAD.   Skin: diffuse livedo reticularis on anterior abdomen and L flank.  No other rashes or wounds noted on examined areas of skin. BMBx dressing c/d/i.  Neuro: A&O - CN II-XII are intact, non focal, no tremor  Access: R port-a-cath accessed with no surrounding erythema, non-tender    Medications     - MEDICATION INSTRUCTIONS -         acyclovir  400 mg Oral BID     ceFEPIme (MAXIPIME) IV  2 g Intravenous Q8H     fluconazole  100 mg Oral Daily     [Held by provider] heparin ANTICOAGULANT  5,000 Units Subcutaneous Q8H     omeprazole  20 mg Oral Daily     prochlorperazine  5 mg Oral 4x Daily AC & HS     psyllium  1 packet Oral Daily     [START ON 10/5/2020] sulfamethoxazole-trimethoprim  1 tablet Oral Q Mon Tues BID     tolterodine ER  4 mg Oral Daily     vancomycin (VANCOCIN) IV  1,750 mg (central catheter) Intravenous Q12H     Data   Results for orders placed or performed during the hospital encounter of 09/30/20 (from the past 24 hour(s))   IR Lymph Node Biopsy    Narrative    Procedures:   1. Ultrasound-guided right groin lymph node biopsy.    Clinical indication: This is a patient with follicular lymphoma.    Comparison studies: PET CT from July 25, 2020.    PROCEDURE:        Interventional radiologists: WING Martinez MD (IR staff)    Consent: verbal and written informed consent obtained prior to  procedure.    Procedure details: Patient placed in supine position. The right groin  was prepped and draped in standard sterile fashion. Using ultrasound  guidance, a 17-gauge coaxial needle was  advanced into one of the lymph  nodes. 6 18-gauge biopsies were taken and placed in formalin, RPMI,  and fresh saline. Pathology presence was requested, however, they  refused.      Medications:1% lidocaine (buffered with 8.4% sodium bicarbonate) for  local anesthesia.     Monitoring: The patient was placed on continuous monitoring. Vital  signs and sedation monitored by nursing staff under my supervision.  The patient remained stable throughout the procedure.    Complications: None.      Impression    IMPRESSION:   Successful right groin lymph node biopsy as above.    PLAN:  Pathology pending.    IHERSON MD, attest that I was present in the procedure room  for the entire procedure.    HERSON MARTINEZ MD   Image Guided Right Inguinal Lymph Node Biopsy    Vern Mayes MD     10/1/2020 10:45 AM  [facility]     Procedure: Image Guided Right Inguinal Lymph Node Biopsy    Date/Time: 10/1/2020 10:42 AM  Performed by: Herson Martinez MD  Authorized by: Herson Martinez MD   IR Fellow Physician:  Radiology Resident Physician: Dr. Vern Tovar      UNIVERSAL PROTOCOL   Site Marked: NA  Prior Images Obtained and Reviewed:  Yes  Required items: Required blood products, implants, devices and special   equipment available    Patient identity confirmed:  Verbally with patient, arm band, provided   demographic data and hospital-assigned identification number  Patient was reevaluated immediately before administering moderate or deep   sedation or anesthesia  Confirmation Checklist:  Patient's identity using two indicators, relevant   allergies, procedure was appropriate and matched the consent or emergent   situation and correct equipment/implants were available  Time out: Immediately prior to the procedure a time out was called    Universal Protocol: the Joint Commission Universal Protocol was followed    Preparation: Patient was prepped and  draped in usual sterile fashion           ANESTHESIA    Anesthesia: Local infiltration  Local Anesthetic:  Lidocaine 1% without epinephrine      SEDATION    Patient Sedated: No    See dictated procedure note for full details.  Findings: 18g core needle biopsies x6 obtained from right inguinal lymph   nodes. No complications encountered.    Specimens: none    Complications: None    Condition: Stable    PROCEDURE   Patient Tolerance:  Patient tolerated the procedure well with no immediate   complications    Length of time physician/provider present for 1:1 monitoring during   sedation: 0   Lactate Dehydrogenase   Result Value Ref Range    Lactate Dehydrogenase 434 (H) 85 - 227 U/L   MR Brain w/o & w Contrast    Narrative     MR BRAIN W/O & W CONTRAST 10/1/2020 5:37 PM    Provided History: Hx of follicular lymphoma, concern for  transformation, new headaches.    Comparison: Whole-body PET/CT 9/29/2020..    Technique: Multiplanar T1-weighted, axial FLAIR, and susceptibility  images were obtained without intravenous contrast. Following  intravenous gadolinium-based contrast administration, axial  T2-weighted, diffusion, and T1-weighted images (in multiple planes)  were obtained.    Contrast: 10mL Gadavist     Findings:  There is no mass effect, midline shift, or evidence of intracranial  hemorrhage. The ventricles are proportionate to the cerebral sulci.  Diffusion-weighted images reveal no abnormal reduced diffusion.   Normal major vascular intracranial flow-voids.    Postcontrast images demonstrate no abnormal intracranial enhancement.    No abnormality of the skull marrow signal. The visualized portions of  paranasal sinuses are relatively clear. Trace mastoid effusions  bilaterally. The orbits are grossly unremarkable.      Impression    Impression:   Unremarkable brain MRI. No evidence for intracranial spread of  disease.    I have personally reviewed the examination and initial interpretation  and I agree with the  findings.    BHAVIK HI MD   Blood culture    Specimen: Portacath; Blood    Portacath   Result Value Ref Range    Specimen Description Blood Portacath     Culture Micro No growth after 5 hours    Magnesium   Result Value Ref Range    Magnesium 2.4 (H) 1.6 - 2.3 mg/dL   Blood culture    Specimen: Blood    Left Hand   Result Value Ref Range    Specimen Description Blood Left Hand     Culture Micro No growth after 5 hours    Vancomycin level   Result Value Ref Range    Vancomycin Level 14.8 mg/L   CBC with platelets differential   Result Value Ref Range    WBC 0.8 (LL) 4.0 - 11.0 10e9/L    RBC Count 2.86 (L) 4.4 - 5.9 10e12/L    Hemoglobin 8.9 (L) 13.3 - 17.7 g/dL    Hematocrit 26.3 (L) 40.0 - 53.0 %    MCV 92 78 - 100 fl    MCH 31.1 26.5 - 33.0 pg    MCHC 33.8 31.5 - 36.5 g/dL    RDW 16.3 (H) 10.0 - 15.0 %    Platelet Count 58 (L) 150 - 450 10e9/L    Diff Method Manual Differential     % Neutrophils 85.0 %    % Lymphocytes 4.9 %    % Monocytes 6.2 %    % Eosinophils 2.7 %    % Basophils 0.4 %    % Metamyelocytes 0.4 %    % Plasma Cells 0.4 %    Absolute Neutrophil 0.7 (L) 1.6 - 8.3 10e9/L    Absolute Lymphocytes 0.0 (L) 0.8 - 5.3 10e9/L    Absolute Monocytes 0.0 0.0 - 1.3 10e9/L    Absolute Eosinophils 0.0 0.0 - 0.7 10e9/L    Absolute Basophils 0.0 0.0 - 0.2 10e9/L    Absolute Metamyelocytes 0.0 0 10e9/L    Absolute Plasma Cells 0.0 0 10e9/L    Anisocytosis Slight     Poikilocytosis Slight     Polychromasia Slight     Ovalocytes Slight    Comprehensive metabolic panel   Result Value Ref Range    Sodium 136 133 - 144 mmol/L    Potassium 3.6 3.4 - 5.3 mmol/L    Chloride 105 94 - 109 mmol/L    Carbon Dioxide 24 20 - 32 mmol/L    Anion Gap 8 3 - 14 mmol/L    Glucose 113 (H) 70 - 99 mg/dL    Urea Nitrogen 9 7 - 30 mg/dL    Creatinine 1.18 0.66 - 1.25 mg/dL    GFR Estimate 68 >60 mL/min/[1.73_m2]    GFR Estimate If Black 79 >60 mL/min/[1.73_m2]    Calcium 7.7 (L) 8.5 - 10.1 mg/dL    Bilirubin Total 0.7 0.2 - 1.3  mg/dL    Albumin 2.9 (L) 3.4 - 5.0 g/dL    Protein Total 5.4 (L) 6.8 - 8.8 g/dL    Alkaline Phosphatase 89 40 - 150 U/L    ALT 49 0 - 70 U/L    AST 53 (H) 0 - 45 U/L   Uric acid   Result Value Ref Range    Uric Acid 3.3 (L) 3.5 - 7.2 mg/dL   Lactate Dehydrogenase   Result Value Ref Range    Lactate Dehydrogenase 475 (H) 85 - 227 U/L   Phosphorus   Result Value Ref Range    Phosphorus 3.1 2.5 - 4.5 mg/dL   Magnesium   Result Value Ref Range    Magnesium 2.4 (H) 1.6 - 2.3 mg/dL   EKG 12-lead, complete   Result Value Ref Range    Interpretation ECG Click View Image link to view waveform and result

## 2020-10-02 NOTE — PROGRESS NOTES
The patient has been seen and evaluated by me, and I have reviewed today's vital signs, medications, labs, and imaging results independently. I have discussed the patient and plan with the team, and agree with the findings and plan outlined in this note. Key aspects of my evaluation, impression, and plan are as follows.     Overnight events, vital signs, labs and meds reviewed.     Juvenal Blake is a 55 year old male admitted on 9/30/2020. He is a 54 year old male with PMH of follicular lymphoma who was admitted from clinic due to 2 days of fevers and chills.      - a/w results of BM biopsy and LN biopsy  -Testicular US and Echo  -fever could be non infectious      Ihsan NASH MS  Attending Physician  Pager - [phone]  Email - [email or web address]

## 2020-10-02 NOTE — PLAN OF CARE
Tmax: 103, MD notified, BC x2 & tylenol given. OVSS. Having abdominal pain and pain to his bmbx site, oxycodone given x2. Denies n/v/d. Bmbx site C/D/I. Groin site C/D/I. No replacements this morning. Voiding adequately. No stools. Up independently. Continue plan of care.         Problem: Adult Inpatient Plan of Care  Goal: Plan of Care Review  Outcome: No Change  Flowsheets (Taken 10/2/2020 0612)  Plan of Care Reviewed With: patient  Progress: no change     Problem: Adult Inpatient Plan of Care  Goal: Patient-Specific Goal (Individualization)  Outcome: No Change     Problem: Adult Inpatient Plan of Care  Goal: Absence of Hospital-Acquired Illness or Injury  Outcome: No Change     Problem: Adult Inpatient Plan of Care  Goal: Readiness for Transition of Care  Outcome: No Change     Problem: Adult Inpatient Plan of Care  Goal: Rounds/Family Conference  Outcome: No Change     Problem: Infection  Goal: Infection Symptom Resolution  Outcome: No Change     Problem: Nausea and Vomiting  Goal: Fluid and Electrolyte Balance  Outcome: No Change

## 2020-10-02 NOTE — PLAN OF CARE
T-max 100.0. All other VSS. A&O x4. Pain to bone marrow bx site and abdomen managed with PRN oxycodone. Intermittent nausea managed with PRN ativan and scheduled compazine. Voiding adequately. 1 BM. Up ad ludin in room. Heart echo to be done this afternoon. Wife at bedside.Sputum sample sent. Continue with POC.

## 2020-10-02 NOTE — PLAN OF CARE
"\" PMH of follicular lymphoma who was admitted from clinic due to 2 days of fevers and chills. \" per Md note     A&Ox4. T max 99.0 orally this afternoon. Pt denied chills. Denied N/V. Pt had echo done this afternoon that showed no pericardial effusion. Up independent to bathroom; voided adequate amount. Pt had bone marrow biopsy today; dressing clean and dry.Wife was at bed side. Continue with POC.  "

## 2020-10-02 NOTE — PHARMACY-VANCOMYCIN DOSING SERVICE
Pharmacy Vancomycin Note  Date of Service 2020  Patient's  1965   55 year old, male    Indication: Skin and Soft Tissue Infection and Fever   Goal Trough Level: 10-15 mg/L  Day of Therapy: 3  Current Vancomycin regimen:  1750 mg IV q12h    Current estimated CrCl = Estimated Creatinine Clearance: 82.1 mL/min (based on SCr of 1.18 mg/dL).    Creatinine for last 3 days  2020: 11:23 AM Creatinine 1.28 mg/dL  2020: 12:03 AM Creatinine 1.37 mg/dL;  2:53 PM Creatinine 1.24 mg/dL  10/1/2020:  5:04 AM Creatinine 1.30 mg/dL  10/2/2020:  3:54 AM Creatinine 1.18 mg/dL    Recent Vancomycin Levels (past 3 days)  10/2/2020:  3:54 AM Vancomycin Level 14.8 mg/L    Vancomycin IV Administrations (past 72 hours)                   vancomycin (VANCOCIN) 1,750 mg in sodium chloride 0.9 % 250 mL intermittent infusion (mg) 1,750 mg New Bag 10/02/20 0514     1,750 mg New Bag 10/01/20 1609     1,750 mg New Bag  0450     1,750 mg New Bag 20 2044    vancomycin (VANCOCIN) 2,500 mg in sodium chloride 0.9 % 500 mL intermittent infusion (mg) 2,500 mg New Bag 20 0228                Nephrotoxins and other renal medications (From now, onward)    Start     Dose/Rate Route Frequency Ordered Stop    20  acyclovir (ZOVIRAX) tablet 400 mg      400 mg Oral 2 TIMES DAILY 20 1850      20 1930  vancomycin (VANCOCIN) 1,750 mg in sodium chloride 0.9 % 250 mL intermittent infusion      1,750 mg (central catheter)  over 60 Minutes Intravenous EVERY 12 HOURS 20 1913               Contrast Orders - past 72 hours (72h ago, onward)    Start     Dose/Rate Route Frequency Ordered Stop    10/01/20 1700  gadobutrol (GADAVIST) injection 10 mL      10 mL Intravenous ONCE 10/01/20 1658 10/01/20 1659          Interpretation of levels and current regimen:  Trough level is  Therapeutic    Has serum creatinine changed > 50% in last 72 hours: No    Urine output:  good urine output    Renal Function:  Stable    Plan:  1.  Continue Current Dose Vancomycin 1750 mg IV q12H.   2.  Pharmacy will check trough levels as appropriate in 1-3 Days.    3. Serum creatinine levels will be ordered daily for the first week of therapy and at least twice weekly for subsequent weeks.      Jose Mares, PharmD, Resident           .

## 2020-10-03 LAB
1,3 BETA GLUCAN SER-MCNC: <31 PG/ML
ALBUMIN SERPL-MCNC: 3 G/DL (ref 3.4–5)
ALP SERPL-CCNC: 121 U/L (ref 40–150)
ALT SERPL W P-5'-P-CCNC: 66 U/L (ref 0–70)
ANION GAP SERPL CALCULATED.3IONS-SCNC: 7 MMOL/L (ref 3–14)
ANISOCYTOSIS BLD QL SMEAR: SLIGHT
AST SERPL W P-5'-P-CCNC: 69 U/L (ref 0–45)
B-D GLUCAN INTERPRETATION (1,3): NEGATIVE
BACTERIA SPEC CULT: NORMAL
BASOPHILS # BLD AUTO: 0 10E9/L (ref 0–0.2)
BASOPHILS NFR BLD AUTO: 0 %
BILIRUB SERPL-MCNC: 0.7 MG/DL (ref 0.2–1.3)
BUN SERPL-MCNC: 10 MG/DL (ref 7–30)
CALCIUM SERPL-MCNC: 8.1 MG/DL (ref 8.5–10.1)
CHLORIDE SERPL-SCNC: 102 MMOL/L (ref 94–109)
CO2 SERPL-SCNC: 25 MMOL/L (ref 20–32)
CREAT SERPL-MCNC: 1.09 MG/DL (ref 0.66–1.25)
DIFFERENTIAL METHOD BLD: ABNORMAL
EOSINOPHIL # BLD AUTO: 0.1 10E9/L (ref 0–0.7)
EOSINOPHIL NFR BLD AUTO: 5.3 %
ERYTHROCYTE [DISTWIDTH] IN BLOOD BY AUTOMATED COUNT: 16.4 % (ref 10–15)
GALACTOMANNAN AG SERPL QL IA: NEGATIVE
GALACTOMANNAN AG SERPL-ACNC: 0.23
GFR SERPL CREATININE-BSD FRML MDRD: 75 ML/MIN/{1.73_M2}
GLUCOSE SERPL-MCNC: 115 MG/DL (ref 70–99)
HCT VFR BLD AUTO: 27.4 % (ref 40–53)
HGB BLD-MCNC: 9.3 G/DL (ref 13.3–17.7)
LDH SERPL L TO P-CCNC: 514 U/L (ref 85–227)
LYMPHOCYTES # BLD AUTO: 0.2 10E9/L (ref 0.8–5.3)
LYMPHOCYTES NFR BLD AUTO: 17.9 %
Lab: NORMAL
MAGNESIUM SERPL-MCNC: 2.2 MG/DL (ref 1.6–2.3)
MCH RBC QN AUTO: 31.1 PG (ref 26.5–33)
MCHC RBC AUTO-ENTMCNC: 33.9 G/DL (ref 31.5–36.5)
MCV RBC AUTO: 92 FL (ref 78–100)
MICROCYTES BLD QL SMEAR: PRESENT
MONOCYTES # BLD AUTO: 0.1 10E9/L (ref 0–1.3)
MONOCYTES NFR BLD AUTO: 10.5 %
NEUTROPHILS # BLD AUTO: 0.7 10E9/L (ref 1.6–8.3)
NEUTROPHILS NFR BLD AUTO: 66.3 %
OVALOCYTES BLD QL SMEAR: SLIGHT
PHOSPHATE SERPL-MCNC: 2.4 MG/DL (ref 2.5–4.5)
PLATELET # BLD AUTO: 59 10E9/L (ref 150–450)
PLATELET # BLD EST: ABNORMAL 10*3/UL
POIKILOCYTOSIS BLD QL SMEAR: SLIGHT
POLYCHROMASIA BLD QL SMEAR: SLIGHT
POTASSIUM SERPL-SCNC: 3.7 MMOL/L (ref 3.4–5.3)
PROT SERPL-MCNC: 5.7 G/DL (ref 6.8–8.8)
RBC # BLD AUTO: 2.99 10E12/L (ref 4.4–5.9)
SODIUM SERPL-SCNC: 134 MMOL/L (ref 133–144)
SPECIMEN SOURCE: NORMAL
URATE SERPL-MCNC: 3.1 MG/DL (ref 3.5–7.2)
WBC # BLD AUTO: 1 10E9/L (ref 4–11)

## 2020-10-03 PROCEDURE — 83615 LACTATE (LD) (LDH) ENZYME: CPT | Performed by: PHYSICIAN ASSISTANT

## 2020-10-03 PROCEDURE — 258N000003 HC RX IP 258 OP 636: Performed by: INTERNAL MEDICINE

## 2020-10-03 PROCEDURE — 85025 COMPLETE CBC W/AUTO DIFF WBC: CPT | Performed by: PHYSICIAN ASSISTANT

## 2020-10-03 PROCEDURE — 258N000003 HC RX IP 258 OP 636: Performed by: PHYSICIAN ASSISTANT

## 2020-10-03 PROCEDURE — 250N000013 HC RX MED GY IP 250 OP 250 PS 637: Performed by: STUDENT IN AN ORGANIZED HEALTH CARE EDUCATION/TRAINING PROGRAM

## 2020-10-03 PROCEDURE — 250N000011 HC RX IP 250 OP 636: Performed by: STUDENT IN AN ORGANIZED HEALTH CARE EDUCATION/TRAINING PROGRAM

## 2020-10-03 PROCEDURE — 80053 COMPREHEN METABOLIC PANEL: CPT | Performed by: PHYSICIAN ASSISTANT

## 2020-10-03 PROCEDURE — 250N000013 HC RX MED GY IP 250 OP 250 PS 637: Performed by: INTERNAL MEDICINE

## 2020-10-03 PROCEDURE — 250N000009 HC RX 250: Performed by: PHYSICIAN ASSISTANT

## 2020-10-03 PROCEDURE — 83735 ASSAY OF MAGNESIUM: CPT | Performed by: PHYSICIAN ASSISTANT

## 2020-10-03 PROCEDURE — 84550 ASSAY OF BLOOD/URIC ACID: CPT | Performed by: PHYSICIAN ASSISTANT

## 2020-10-03 PROCEDURE — 84100 ASSAY OF PHOSPHORUS: CPT | Performed by: PHYSICIAN ASSISTANT

## 2020-10-03 PROCEDURE — 250N000011 HC RX IP 250 OP 636: Performed by: INTERNAL MEDICINE

## 2020-10-03 PROCEDURE — 206N000001 HC R&B BMT UMMC

## 2020-10-03 PROCEDURE — 250N000013 HC RX MED GY IP 250 OP 250 PS 637: Performed by: PHYSICIAN ASSISTANT

## 2020-10-03 RX ORDER — HYDROXYZINE HYDROCHLORIDE 50 MG/1
50 TABLET, FILM COATED ORAL EVERY 6 HOURS PRN
Status: DISCONTINUED | OUTPATIENT
Start: 2020-10-03 | End: 2020-10-05 | Stop reason: HOSPADM

## 2020-10-03 RX ADMIN — OXYCODONE HYDROCHLORIDE 10 MG: 5 TABLET ORAL at 20:14

## 2020-10-03 RX ADMIN — PROCHLORPERAZINE MALEATE 5 MG: 5 TABLET ORAL at 12:19

## 2020-10-03 RX ADMIN — VANCOMYCIN HYDROCHLORIDE 1750 MG: 10 INJECTION, POWDER, LYOPHILIZED, FOR SOLUTION INTRAVENOUS at 16:36

## 2020-10-03 RX ADMIN — VANCOMYCIN HYDROCHLORIDE 1750 MG: 10 INJECTION, POWDER, LYOPHILIZED, FOR SOLUTION INTRAVENOUS at 04:02

## 2020-10-03 RX ADMIN — CEFEPIME HYDROCHLORIDE 2 G: 2 INJECTION, POWDER, FOR SOLUTION INTRAVENOUS at 13:58

## 2020-10-03 RX ADMIN — ACYCLOVIR 400 MG: 400 TABLET ORAL at 20:14

## 2020-10-03 RX ADMIN — ACETAMINOPHEN 650 MG: 325 TABLET, FILM COATED ORAL at 09:34

## 2020-10-03 RX ADMIN — HYDROXYZINE HYDROCHLORIDE 50 MG: 50 TABLET, FILM COATED ORAL at 15:52

## 2020-10-03 RX ADMIN — PSYLLIUM HUSK 1 PACKET: 3.4 POWDER ORAL at 09:13

## 2020-10-03 RX ADMIN — OMEPRAZOLE 20 MG: 20 CAPSULE, DELAYED RELEASE ORAL at 09:14

## 2020-10-03 RX ADMIN — CEFEPIME HYDROCHLORIDE 2 G: 2 INJECTION, POWDER, FOR SOLUTION INTRAVENOUS at 06:20

## 2020-10-03 RX ADMIN — OXYCODONE HYDROCHLORIDE 10 MG: 5 TABLET ORAL at 10:40

## 2020-10-03 RX ADMIN — CEFEPIME HYDROCHLORIDE 2 G: 2 INJECTION, POWDER, FOR SOLUTION INTRAVENOUS at 21:08

## 2020-10-03 RX ADMIN — OXYCODONE HYDROCHLORIDE 10 MG: 5 TABLET ORAL at 04:01

## 2020-10-03 RX ADMIN — SODIUM PHOSPHATE, MONOBASIC, MONOHYDRATE AND SODIUM PHOSPHATE, DIBASIC, ANHYDROUS 15 MMOL: 276; 142 INJECTION, SOLUTION INTRAVENOUS at 07:08

## 2020-10-03 RX ADMIN — PROCHLORPERAZINE MALEATE 5 MG: 5 TABLET ORAL at 09:13

## 2020-10-03 RX ADMIN — FLUCONAZOLE 100 MG: 100 TABLET ORAL at 09:14

## 2020-10-03 RX ADMIN — LORAZEPAM 1 MG: 0.5 TABLET ORAL at 15:23

## 2020-10-03 RX ADMIN — TOLTERODINE TARTRATE 4 MG: 4 CAPSULE, EXTENDED RELEASE ORAL at 09:13

## 2020-10-03 RX ADMIN — PROCHLORPERAZINE MALEATE 5 MG: 5 TABLET ORAL at 21:08

## 2020-10-03 RX ADMIN — PROCHLORPERAZINE MALEATE 5 MG: 5 TABLET ORAL at 15:52

## 2020-10-03 RX ADMIN — ACYCLOVIR 400 MG: 400 TABLET ORAL at 09:14

## 2020-10-03 ASSESSMENT — PAIN DESCRIPTION - DESCRIPTORS
DESCRIPTORS: ACHING
DESCRIPTORS: HEADACHE

## 2020-10-03 ASSESSMENT — ACTIVITIES OF DAILY LIVING (ADL)
ADLS_ACUITY_SCORE: 10

## 2020-10-03 NOTE — PROGRESS NOTES
[facility]     Hematology / Oncology Progress Note    Date of Service (when I saw the patient): 10/03/2020     Assessment & Plan   Juvenal Blake is a 55 year old male admitted on 2020. He is a 54 year old male with PMH of follicular lymphoma who was admitted from clinic due to 2 days of fevers and chills.      Today:  - Continue Cefepime/Vancomycin  - Await pathology results  - Continue telemetry    ID  # Sepsis due to ?strep pharyngitis vs cellulitis  # Concern for bacteremia  Seen in ED () for abdominal pain, fevers (103) - rapid strep positive, given 1 dose PCN G, Ceftriaxone and discharged home. Seen in clinic () with persistent fevers, chills, mild nausea and inguinal lymphadenopathy. Given one dose of Cefepime in clinic, then admitted due to concern for bacteremia with workup as below. Stable dry cough over past 1-2 months.   Diagnostic:  - COVID (9/15, ) negative   - UA () unremarkable.  - CXR () unremarkable.  - BCx ( 0000,  2330) peripheral, port NGTD.   - CMV undetectable  - Throat culture negative  - Sputum culture cancelled due to squamous cells; will hold off on further cultures  - Pendin,3 Beta Glucan Fungitell, Aspergillus    Therapeutic  - Continue Cefepime ( - )  - Continue Vancomycin ( - )  - APAP 650mg Q4H PRN     # Infectious Prophylaxis  - Continue Acyclovir.  - Continue Fluconazole.  - Continue Bactrim Monday, Tuesday BID.      HEME/ONC  # Follicular lymphoma s/p NAM-NK cell therapy (), concern for relapse vs transformation  Follows with Dr. Chan. Initially diagnosed with grade I-II follicular lymphoma in , stage IV at diagnosis with marrow involvement.   -  - Bendamustine/Rituxan, achieved CR.   -  -  - Maintenance Rituxan.   - 2016 - Relapsed, again with low-grade follicular lymphoma. Treated with Bendamustine/Rituxan, achieved CR.   -  - 2019 -  Maintenance Rituxan  -  1/19 - Relapsed, again with low-grade follicular lymphoma. Treated with O-CHOP, achieved CR.  - 4/20 - Imaging with concerning adenopathy. Axillary node biopsy positive for grade I-II follicular lymphoma, expressed CD19/CD20. Ki-67 20%.  - 7/20 - BMBx with inimal histologic evidence of paratrabecular low grade lymphoma (comprising less than 5% of overall marrow cellularity). 0.6% CD10-positive kappa monotypic B cells, negative for IGH-BCL2 fusion.  - 9/1/20 - Day 0 NAM-NK cell therapy. Today is Day +30.   - 9/28/20 - PET/CT with overall increased hypermetabolic adenopathy in the abdomen and pelvis, as well as increased lymphomatous infiltration of soft tissue in the central mesentery. Increased splenomegaly, new/worsening hypermetabolic deposits in the retroperitoneal fat. Concern for rapid progression of lymphoma.   - S/p IR-guided biopsy of right inguinal lymph node (10/1) - path, flow, FISH pending.  - BMBx (10/1) due to worsening pancytopenia with concern for marrow involvement as above - Morphology, Flow, Cytogenetics, FISH pending.   - Brain MRI (10/1) negative for acute intracranial pathology, lymphoma involvement.      # Pancytopenia  Secondary to chemotherapy, malignancy with concern for lymphomatous marrow involvement. Not currently neutropenic, however counts have been worsening over past week. Hgb 9.3, plts 61, WBC 1.5 (ANC 1.4) today.  - Transfuse for Hgb >7, Plt >10  - BMBx results pending.     # History of prostate cancer  S/p radical prostatectomy with positive margins. Completed radiation 5/2018.  - Continue Tolterodine    # Hx of hypogammaglobulinemia  Per notes, reported history of receiving IgG.   - Could consider recheck.     CARDS  # Incomplete RBBB  #Sinus pauses   Incomplete RBB noted on EKG 10/2 with occasional PACs. On exam, he continues to have pauses, with telemetry showing sinus pauses and occasional PVCs. Unclear if his dizziness and flushing is related to this, or due to whatever  underlying pathology caused him to be admitted. He does have persistent dyspnea on exertion, which is unchanged with recent Lexiscan stress test with no inducible myocardial ischemia/infarction. TTE obtained 10/2. Hyperkinetic, EF 60-65%. No valvular abnormalities.   - Continue Tele  - Considering cards consulted based on Tele    DERM  # Livedo reticularis  Diffuse, most prominent on abdomen and back. No erythema, no concern for infection.   - Continue to monitor.      GI  # Nausea  Ongoing after chemotherapy. Allergy to Zofran.  - Continue scheduled Compazine TID.  - Continue Ativan, Compazine PRN.     # Constipation, improving  Recent stools have been harder over the past 3 days on admission. Now with normal BMs  - Continue PTA Psyllium.  - Continue Senna 1-2 tablets BID PRN.     Diet: Regular  DVT Prophylaxis: Heparin subcutaneous, HELD for IR biopsy today  Code Status: Full    Patient seen and plan of care discussed with attending physician, Dr. Washington.    Cristian Leon MD PhD  Heme/Onc/Transplant Fellow  [facility] [phone]      Interval History   - Feeling better today  - Rash improving  - Occasional light-headedness and flushing/sweats while sitting    Physical Exam   Temp: 98.5  F (36.9  C) Temp src: Oral BP: 119/53 Pulse: 65   Resp: 18 SpO2: 94 % O2 Device: None (Room air)    Vitals:    10/01/20 0821   Weight: 107.4 kg (236 lb 12.8 oz)     Vital Signs with Ranges  Temp:  [97.3  F (36.3  C)-100  F (37.8  C)] 98.5  F (36.9  C)  Pulse:  [] 65  Resp:  [18] 18  BP: (100-135)/(53-79) 119/53  SpO2:  [94 %-99 %] 94 %  I/O last 3 completed shifts:  In: 2130 [P.O.:1200; I.V.:930]  Out: 3900 [Urine:3900]  General: Sitting up in bed, in NAD  Eyes: JOE, sclera anicteric   Nose/Mouth/Throat: Oropharynx with mild exudate, buccal mucosa moist, no ulcerations   Lungs: CTA bilaterally. No increased work of breathing. Breathing comfortably on room air  Cardiovascular: Occasional dropped beats, no M/R/G    Abdominal/Rectal: Obese, +BS, soft, distended, no guarding or rebound, no palpable masses  Lymphatics: Large palpable R inguinal LN, mildly tender to palpation. No palpable cervical or axillary LAD.   Skin: diffuse livedo reticularis on anterior abdomen and L flank, improving compared to prior.  No other rashes or wounds noted on examined areas of skin. BMBx dressing c/d/i.  Neuro: A&O - CN II-XII are intact, non focal, no tremor  Access: R port-a-cath accessed with no surrounding erythema, non-tender    Medications     - MEDICATION INSTRUCTIONS -         acyclovir  400 mg Oral BID     ceFEPIme (MAXIPIME) IV  2 g Intravenous Q8H     fluconazole  100 mg Oral Daily     [Held by provider] heparin ANTICOAGULANT  5,000 Units Subcutaneous Q8H     omeprazole  20 mg Oral Daily     prochlorperazine  5 mg Oral 4x Daily AC & HS     psyllium  1 packet Oral Daily     [START ON 10/5/2020] sulfamethoxazole-trimethoprim  1 tablet Oral Q Mon Tues BID     tolterodine ER  4 mg Oral Daily     vancomycin (VANCOCIN) IV  1,750 mg (central catheter) Intravenous Q12H     Data   Results for orders placed or performed during the hospital encounter of 09/30/20 (from the past 24 hour(s))   EKG 12-lead, complete   Result Value Ref Range    Interpretation ECG Click View Image link to view waveform and result    Sputum Culture Aerobic Bacterial    Specimen: Sputum   Result Value Ref Range    Specimen Description Sputum     Culture Micro (A)      Canceled, Test credited  >10 Squamous epithelial cells/low power field indicates oral contamination. Please   recollect.      Culture Micro       Notification of test cancellation was given to  MATT ORTIZ RN 0048 10.2.20 NDP     Gram stain    Specimen: Sputum    Screen   Result Value Ref Range    Specimen Description Sputum Screen     Gram Stain (A)      >10 Squamous epithelial cells/low power field indicates oral contamination. Please   recollect.      Gram Stain <25 PMNs/low power field     Gram  Stain Many  Mixed gram negative and positive sarwat      Echo Limited    Narrative    [id number]  [id number]  [id number]  [id number]^CHAPINCITO^BONILLA           [facility],[place]  Echocardiography Laboratory  [address] [place] [address]     Name: TARA BERTRAND  MRN: [id number]  : 1965  Study Date: 10/02/2020 02:16 PM  Age: 55 yrs  Gender: Male  Patient Location: [facility]  Reason For Study: BBB  Ordering Physician: BONILLA BECKHAM  Referring Physician: ANNI FRIEDMAN  Performed By: GILA Holliday     BSA: 2.2 m2  Height: 68 in  Weight: 236 lb  HR: 77  BP: 102/74 mmHg  _____________________________________________________________________________  __        Procedure  Limited Portable Echo Adult. Contrast Optison. Patient was given 5 ml mixture  of 3 ml Optison and 6 ml saline. 4 ml wasted.  _____________________________________________________________________________  __        Interpretation Summary  Global and regional left ventricular function is hyperkinetic with an EF of  65-70%.  Right ventricular function, chamber size, wall motion, and thickness are  normal.  No significant valvular abnormalities were noted.  No pericardial effusion is present.     This study was compared with the study from 2020 .There has been no  change.  _____________________________________________________________________________  __        Left Ventricle  Global and regional left ventricular function is hyperkinetic with an EF of  65-70%. Left ventricular size is normal. Left ventricular wall thickness is  normal. Left ventricular diastolic function is indeterminate.     Right Ventricle  Right ventricular function, chamber size, wall motion, and thickness are  normal.     Atria  The atria cannot be assessed.     Mitral Valve  The mitral valve is normal.        Aortic Valve  Aortic valve is normal in structure and function. The aortic valve is  tricuspid.     Tricuspid Valve  The tricuspid  valve is normal. Trace tricuspid insufficiency is present. The  right ventricular systolic pressure is approximated at 28.9 mmHg plus the  right atrial pressure.     Pulmonic Valve  The pulmonic valve is normal.     Vessels  The aorta root is normal. Dilation of the inferior vena cava is present with  normal respiratory variation in diameter. IVC diameter and respiratory changes  fall into an intermediate range suggesting an RA pressure of 8 mmHg.     Pericardium  No pericardial effusion is present.        Compared to Previous Study  This study was compared with the study from 2020 . There has been no  change.  _____________________________________________________________________________  __  MMode/2D Measurements & Calculations  IVSd: 0.72 cm     LVIDd: 6.2 cm  LVIDs: 4.0 cm  LVPWd: 0.74 cm  FS: 34.3 %  LV mass(C)d: 173.0 grams  LV mass(C)dI: 78.9 grams/m2  LVOT diam: 2.3 cm  LVOT area: 4.2 cm2     EF(MOD-bp): 68.6 %  RWT: 0.24        Doppler Measurements & Calculations  MV E max senthil: 105.0 cm/sec  MV A max senthil: 88.8 cm/sec  MV E/A: 1.2  MV dec slope: 496.0 cm/sec2  Ao V2 max: 208.0 cm/sec  Ao max P.3 mmHg  Ao V2 mean: 143.0 cm/sec  Ao mean P.0 mmHg  Ao V2 VTI: 35.5 cm  SHANNON(I,D): 2.8 cm2  SHANNON(V,D): 3.0 cm2  LV V1 max P.9 mmHg  LV V1 max: 149.0 cm/sec  LV V1 VTI: 24.2 cm  SV(LVOT): 100.5 ml  SI(LVOT): 45.9 ml/m2  TR max senthil: 269.0 cm/sec  TR max P.9 mmHg  AV Senthil Ratio (DI): 0.72  SHANNON Index (cm2/m2): 1.3     E/E' av.0  Lateral E/e': 8.3  Medial E/e': 9.6     _____________________________________________________________________________  __           Report approved by: Lexii HERMAN 10/02/2020 04:13 PM      CBC with platelets differential   Result Value Ref Range    WBC 1.0 (L) 4.0 - 11.0 10e9/L    RBC Count 2.99 (L) 4.4 - 5.9 10e12/L    Hemoglobin 9.3 (L) 13.3 - 17.7 g/dL    Hematocrit 27.4 (L) 40.0 - 53.0 %    MCV 92 78 - 100 fl    MCH 31.1 26.5 - 33.0 pg    MCHC 33.9 31.5 - 36.5 g/dL     RDW 16.4 (H) 10.0 - 15.0 %    Platelet Count 59 (L) 150 - 450 10e9/L    Diff Method Manual Differential     % Neutrophils 66.3 %    % Lymphocytes 17.9 %    % Monocytes 10.5 %    % Eosinophils 5.3 %    % Basophils 0.0 %    Absolute Neutrophil 0.7 (L) 1.6 - 8.3 10e9/L    Absolute Lymphocytes 0.2 (L) 0.8 - 5.3 10e9/L    Absolute Monocytes 0.1 0.0 - 1.3 10e9/L    Absolute Eosinophils 0.1 0.0 - 0.7 10e9/L    Absolute Basophils 0.0 0.0 - 0.2 10e9/L    Anisocytosis Slight     Poikilocytosis Slight     Polychromasia Slight     Ovalocytes Slight     Microcytes Present     Platelet Estimate Confirming automated cell count    Comprehensive metabolic panel   Result Value Ref Range    Sodium 134 133 - 144 mmol/L    Potassium 3.7 3.4 - 5.3 mmol/L    Chloride 102 94 - 109 mmol/L    Carbon Dioxide 25 20 - 32 mmol/L    Anion Gap 7 3 - 14 mmol/L    Glucose 115 (H) 70 - 99 mg/dL    Urea Nitrogen 10 7 - 30 mg/dL    Creatinine 1.09 0.66 - 1.25 mg/dL    GFR Estimate 75 >60 mL/min/[1.73_m2]    GFR Estimate If Black 87 >60 mL/min/[1.73_m2]    Calcium 8.1 (L) 8.5 - 10.1 mg/dL    Bilirubin Total 0.7 0.2 - 1.3 mg/dL    Albumin 3.0 (L) 3.4 - 5.0 g/dL    Protein Total 5.7 (L) 6.8 - 8.8 g/dL    Alkaline Phosphatase 121 40 - 150 U/L    ALT 66 0 - 70 U/L    AST 69 (H) 0 - 45 U/L   Uric acid   Result Value Ref Range    Uric Acid 3.1 (L) 3.5 - 7.2 mg/dL   Lactate Dehydrogenase   Result Value Ref Range    Lactate Dehydrogenase 514 (H) 85 - 227 U/L   Phosphorus   Result Value Ref Range    Phosphorus 2.4 (L) 2.5 - 4.5 mg/dL   Magnesium   Result Value Ref Range    Magnesium 2.2 1.6 - 2.3 mg/dL

## 2020-10-03 NOTE — PLAN OF CARE
Problem: Adult Inpatient Plan of Care  Goal: Plan of Care Review  Outcome: No Change  Flowsheets (Taken 10/3/2020 3110)  Plan of Care Reviewed With: patient    Up independently in his room. Heart rate 70's/ irregular, telemetry initiated. Freq PAC and PVC's, denies chest pain. Pt expressed disappointment at continuous monniting. C/o headache, tylenol given at 0930 for relief and back pain,oxy 10 given for relief at 1030. Afebrile but pt is having bouts of chills and being diaphoretic x2 this shift. Appetite fair, intermittent nausea, compazine scheduled. BMBx site drsg changed and site is CDI. Removed droplet iso four days after PCN. Denies sore throat. Phos replaced. OVSS. Wife visiting.

## 2020-10-03 NOTE — PLAN OF CARE
AVSS on RA. Pt reports ongoing pain in lower back, at bmbx site. Dressing is CDI. Pain managed well with 10mg oxycodone x2. Denies nausea. Voiding adequately. 15mmol phos infusing, no other replacements needed. Continue to monitor.     Problem: Adult Inpatient Plan of Care  Goal: Optimal Comfort and Wellbeing  Outcome: No Change     Problem: Nausea and Vomiting  Goal: Fluid and Electrolyte Balance  Outcome: No Change     Problem: Infection  Goal: Infection Symptom Resolution  Outcome: Improving

## 2020-10-03 NOTE — PLAN OF CARE
Pt c/o itching and atarax 50 mg po was given with relief. Pt c/o anxiety and ativan 1 mg po was given with relief. Pt showered and tele leads changed.  Problem: Adult Inpatient Plan of Care  Goal: Plan of Care Review  Outcome: No Change  Flowsheets (Taken 10/3/2020 1750)  Plan of Care Reviewed With: patient  Progress: no change  Goal: Patient-Specific Goal (Individualization)  Outcome: No Change  Goal: Absence of Hospital-Acquired Illness or Injury  Outcome: No Change  Intervention: Identify and Manage Fall Risk  Recent Flowsheet Documentation  Taken 10/3/2020 1600 by Itzel Simmons RN  Safety Promotion/Fall Prevention: fall prevention program maintained  Intervention: Prevent VTE (venous thromboembolism)  Recent Flowsheet Documentation  Taken 10/3/2020 1600 by Itzel Simmons RN  VTE Prevention/Management: ambulation promoted  Goal: Optimal Comfort and Wellbeing  Outcome: No Change  Intervention: Provide Person-Centered Care  Recent Flowsheet Documentation  Taken 10/3/2020 1600 by Itzel Simmons RN  Trust Relationship/Rapport: care explained  Goal: Readiness for Transition of Care  Outcome: No Change  Goal: Rounds/Family Conference  Outcome: No Change     Problem: Infection  Goal: Infection Symptom Resolution  Outcome: No Change  Intervention: Prevent or Manage Infection  Recent Flowsheet Documentation  Taken 10/3/2020 1600 by Itzel Simmons RN  Isolation Precautions: protective environment maintained     Problem: Nausea and Vomiting  Goal: Fluid and Electrolyte Balance  Outcome: No Change  Intervention: Prevent and Manage Nausea and Vomiting  Recent Flowsheet Documentation  Taken 10/3/2020 1600 by Itzel Simmons RN  Environmental Support: calm environment promoted

## 2020-10-04 LAB
ALBUMIN SERPL-MCNC: 2.9 G/DL (ref 3.4–5)
ALP SERPL-CCNC: 132 U/L (ref 40–150)
ALT SERPL W P-5'-P-CCNC: 72 U/L (ref 0–70)
ANION GAP SERPL CALCULATED.3IONS-SCNC: 3 MMOL/L (ref 3–14)
ANISOCYTOSIS BLD QL SMEAR: SLIGHT
AST SERPL W P-5'-P-CCNC: 71 U/L (ref 0–45)
BASOPHILS # BLD AUTO: 0 10E9/L (ref 0–0.2)
BASOPHILS NFR BLD AUTO: 0.9 %
BILIRUB SERPL-MCNC: 0.5 MG/DL (ref 0.2–1.3)
BUN SERPL-MCNC: 10 MG/DL (ref 7–30)
CALCIUM SERPL-MCNC: 8 MG/DL (ref 8.5–10.1)
CHLORIDE SERPL-SCNC: 106 MMOL/L (ref 94–109)
CO2 SERPL-SCNC: 30 MMOL/L (ref 20–32)
CREAT SERPL-MCNC: 1.05 MG/DL (ref 0.66–1.25)
DIFFERENTIAL METHOD BLD: ABNORMAL
EOSINOPHIL # BLD AUTO: 0.2 10E9/L (ref 0–0.7)
EOSINOPHIL NFR BLD AUTO: 9.1 %
ERYTHROCYTE [DISTWIDTH] IN BLOOD BY AUTOMATED COUNT: 16.5 % (ref 10–15)
GFR SERPL CREATININE-BSD FRML MDRD: 78 ML/MIN/{1.73_M2}
GLUCOSE SERPL-MCNC: 100 MG/DL (ref 70–99)
HCT VFR BLD AUTO: 25.2 % (ref 40–53)
HGB BLD-MCNC: 8.6 G/DL (ref 13.3–17.7)
LDH SERPL L TO P-CCNC: 474 U/L (ref 85–227)
LYMPHOCYTES # BLD AUTO: 0.4 10E9/L (ref 0.8–5.3)
LYMPHOCYTES NFR BLD AUTO: 17.3 %
MAGNESIUM SERPL-MCNC: 2.2 MG/DL (ref 1.6–2.3)
MCH RBC QN AUTO: 31.3 PG (ref 26.5–33)
MCHC RBC AUTO-ENTMCNC: 34.1 G/DL (ref 31.5–36.5)
MCV RBC AUTO: 92 FL (ref 78–100)
MONOCYTES # BLD AUTO: 0.3 10E9/L (ref 0–1.3)
MONOCYTES NFR BLD AUTO: 13.6 %
NEUTROPHILS # BLD AUTO: 1.2 10E9/L (ref 1.6–8.3)
NEUTROPHILS NFR BLD AUTO: 59.1 %
NRBC # BLD AUTO: 0 10*3/UL
NRBC BLD AUTO-RTO: 2 /100
PHOSPHATE SERPL-MCNC: 3.5 MG/DL (ref 2.5–4.5)
PLATELET # BLD AUTO: 62 10E9/L (ref 150–450)
PLATELET # BLD EST: ABNORMAL 10*3/UL
POLYCHROMASIA BLD QL SMEAR: SLIGHT
POTASSIUM SERPL-SCNC: 4 MMOL/L (ref 3.4–5.3)
PROT SERPL-MCNC: 5.4 G/DL (ref 6.8–8.8)
RBC # BLD AUTO: 2.75 10E12/L (ref 4.4–5.9)
SODIUM SERPL-SCNC: 139 MMOL/L (ref 133–144)
URATE SERPL-MCNC: 3.2 MG/DL (ref 3.5–7.2)
WBC # BLD AUTO: 2.1 10E9/L (ref 4–11)

## 2020-10-04 PROCEDURE — 96372 THER/PROPH/DIAG INJ SC/IM: CPT | Performed by: STUDENT IN AN ORGANIZED HEALTH CARE EDUCATION/TRAINING PROGRAM

## 2020-10-04 PROCEDURE — 258N000003 HC RX IP 258 OP 636: Performed by: INTERNAL MEDICINE

## 2020-10-04 PROCEDURE — 206N000001 HC R&B BMT UMMC

## 2020-10-04 PROCEDURE — 250N000013 HC RX MED GY IP 250 OP 250 PS 637: Performed by: PHYSICIAN ASSISTANT

## 2020-10-04 PROCEDURE — 250N000011 HC RX IP 250 OP 636: Performed by: INTERNAL MEDICINE

## 2020-10-04 PROCEDURE — 83735 ASSAY OF MAGNESIUM: CPT | Performed by: PHYSICIAN ASSISTANT

## 2020-10-04 PROCEDURE — 250N000011 HC RX IP 250 OP 636: Performed by: STUDENT IN AN ORGANIZED HEALTH CARE EDUCATION/TRAINING PROGRAM

## 2020-10-04 PROCEDURE — 250N000013 HC RX MED GY IP 250 OP 250 PS 637: Performed by: STUDENT IN AN ORGANIZED HEALTH CARE EDUCATION/TRAINING PROGRAM

## 2020-10-04 PROCEDURE — 84550 ASSAY OF BLOOD/URIC ACID: CPT | Performed by: PHYSICIAN ASSISTANT

## 2020-10-04 PROCEDURE — 83615 LACTATE (LD) (LDH) ENZYME: CPT | Performed by: PHYSICIAN ASSISTANT

## 2020-10-04 PROCEDURE — 85025 COMPLETE CBC W/AUTO DIFF WBC: CPT | Performed by: PHYSICIAN ASSISTANT

## 2020-10-04 PROCEDURE — 80053 COMPREHEN METABOLIC PANEL: CPT | Performed by: PHYSICIAN ASSISTANT

## 2020-10-04 PROCEDURE — 84100 ASSAY OF PHOSPHORUS: CPT | Performed by: PHYSICIAN ASSISTANT

## 2020-10-04 RX ADMIN — FLUCONAZOLE 100 MG: 100 TABLET ORAL at 09:00

## 2020-10-04 RX ADMIN — CEFEPIME HYDROCHLORIDE 2 G: 2 INJECTION, POWDER, FOR SOLUTION INTRAVENOUS at 14:40

## 2020-10-04 RX ADMIN — TOLTERODINE TARTRATE 4 MG: 4 CAPSULE, EXTENDED RELEASE ORAL at 09:00

## 2020-10-04 RX ADMIN — HEPARIN SODIUM 5000 UNITS: 5000 INJECTION, SOLUTION INTRAVENOUS; SUBCUTANEOUS at 18:19

## 2020-10-04 RX ADMIN — PROCHLORPERAZINE MALEATE 5 MG: 5 TABLET ORAL at 17:14

## 2020-10-04 RX ADMIN — VANCOMYCIN HYDROCHLORIDE 1750 MG: 10 INJECTION, POWDER, LYOPHILIZED, FOR SOLUTION INTRAVENOUS at 04:02

## 2020-10-04 RX ADMIN — ACYCLOVIR 400 MG: 400 TABLET ORAL at 09:00

## 2020-10-04 RX ADMIN — CEFEPIME HYDROCHLORIDE 2 G: 2 INJECTION, POWDER, FOR SOLUTION INTRAVENOUS at 06:36

## 2020-10-04 RX ADMIN — PROCHLORPERAZINE MALEATE 5 MG: 5 TABLET ORAL at 09:00

## 2020-10-04 RX ADMIN — PROCHLORPERAZINE MALEATE 5 MG: 5 TABLET ORAL at 12:08

## 2020-10-04 RX ADMIN — OMEPRAZOLE 20 MG: 20 CAPSULE, DELAYED RELEASE ORAL at 08:59

## 2020-10-04 RX ADMIN — PSYLLIUM HUSK 1 PACKET: 3.4 POWDER ORAL at 08:59

## 2020-10-04 RX ADMIN — CEFEPIME HYDROCHLORIDE 2 G: 2 INJECTION, POWDER, FOR SOLUTION INTRAVENOUS at 22:34

## 2020-10-04 RX ADMIN — ACYCLOVIR 400 MG: 400 TABLET ORAL at 20:50

## 2020-10-04 RX ADMIN — PROCHLORPERAZINE MALEATE 5 MG: 5 TABLET ORAL at 22:34

## 2020-10-04 RX ADMIN — OXYCODONE HYDROCHLORIDE 10 MG: 5 TABLET ORAL at 09:16

## 2020-10-04 RX ADMIN — VANCOMYCIN HYDROCHLORIDE 1750 MG: 10 INJECTION, POWDER, LYOPHILIZED, FOR SOLUTION INTRAVENOUS at 17:14

## 2020-10-04 ASSESSMENT — ACTIVITIES OF DAILY LIVING (ADL)
ADLS_ACUITY_SCORE: 10

## 2020-10-04 ASSESSMENT — PAIN DESCRIPTION - DESCRIPTORS: DESCRIPTORS: ACHING

## 2020-10-04 NOTE — PLAN OF CARE
Problem: Adult Inpatient Plan of Care  Goal: Plan of Care Review  Outcome: No Change  Flowsheets (Taken 10/4/2020 8739)  Plan of Care Reviewed With: patient    Nose bld this morning resolved with pressure. C/o back pain, oxycodone given as requested for relief. BMBx site CDI, promise chged. Sinus arrhythmia continues with PAC's, pauses and infreq PVC's. Less ectopy than yesterday. Telemetry DC'ed. Continues to experience dyspnea with activity, O2 sats at rest in the high 90's..  Mild intermittent nausea persists but appetite is fair to good, continues with scheduled compazine, One loose stool. VSS Pt states one tooth feels loose on the lower left that has a root canal but pt is unsure if the tooth is capped. Wife is visiting. Pleasant and cooperative.

## 2020-10-04 NOTE — PROGRESS NOTES
[facility]     Hematology / Oncology Progress Note    Date of Service (when I saw the patient): 10/04/2020     Assessment & Plan   Juvenal Blake is a 55 year old male admitted on 2020. He is a 54 year old male with PMH of follicular lymphoma who was admitted from clinic due to 2 days of fevers and chills.      Today:  - No changes to abx: continue Cefepime/Vancomycin  - Discontinue telemetry    ID  # Sepsis due to ?strep pharyngitis vs cellulitis  # Concern for bacteremia  Seen in ED () for abdominal pain, fevers (103) - rapid strep positive, given 1 dose PCN G, Ceftriaxone and discharged home. Seen in clinic () with persistent fevers, chills, mild nausea and inguinal lymphadenopathy. Given one dose of Cefepime in clinic, then admitted due to concern for bacteremia with workup as below. Stable dry cough over past 1-2 months. No clear source defined aside from strep, and overall improving, with reduced rash. At this point, will plan for likely 10-14D course of abx, with transition to oral tomorrow vs next day, depending on if continues to fever  Diagnostic:  - COVID (9/15, ) negative   - UA () unremarkable.  - CXR () unremarkable.  - BCx ( 0000,  2330) peripheral, port NGTD.   - CMV undetectable  - Throat culture negative  - Sputum culture cancelled due to squamous cells; will hold off on further cultures  - Pendin,3 Beta Glucan Fungitell, Aspergillus    Therapeutic  - Continue Cefepime ( - )  - Continue Vancomycin ( - )  - APAP 650mg Q4H PRN     # Infectious Prophylaxis  - Continue Acyclovir.  - Continue Fluconazole.  - Continue Bactrim Monday, Tuesday BID.      HEME/ONC  # Follicular lymphoma s/p NAM-NK cell therapy (), concern for relapse vs transformation  Follows with Dr. Chan. Initially diagnosed with grade I-II follicular lymphoma in , stage IV at diagnosis with marrow involvement.   -  -  Bendamustine/Rituxan, achieved CR.   - 2010 - 2012 - Maintenance Rituxan.   - 12/2016 - Relapsed, again with low-grade follicular lymphoma. Treated with Bendamustine/Rituxan, achieved CR.   - 2017 - 4/2019 -  Maintenance Rituxan  - 1/19 - Relapsed, again with low-grade follicular lymphoma. Treated with O-CHOP, achieved CR.  - 4/20 - Imaging with concerning adenopathy. Axillary node biopsy positive for grade I-II follicular lymphoma, expressed CD19/CD20. Ki-67 20%.  - 7/20 - BMBx with inimal histologic evidence of paratrabecular low grade lymphoma (comprising less than 5% of overall marrow cellularity). 0.6% CD10-positive kappa monotypic B cells, negative for IGH-BCL2 fusion.  - 9/1/20 - Day 0 NAM-NK cell therapy. Today is Day +30.   - 9/28/20 - PET/CT with overall increased hypermetabolic adenopathy in the abdomen and pelvis, as well as increased lymphomatous infiltration of soft tissue in the central mesentery. Increased splenomegaly, new/worsening hypermetabolic deposits in the retroperitoneal fat. Concern for rapid progression of lymphoma.   - S/p IR-guided biopsy of right inguinal lymph node (10/1) - path, flow, FISH pending.  - BMBx (10/1) due to worsening pancytopenia with concern for marrow involvement as above - Morphology, Flow, Cytogenetics, FISH pending.   - Brain MRI (10/1) negative for acute intracranial pathology, lymphoma involvement.      # Pancytopenia, improving  Secondary to chemotherapy, malignancy with concern for lymphomatous marrow involvement. Now improving, and actually suspect that initial down-trend may have been related to acute infection, although biopsy pending  - Transfuse for Hgb >7, Plt >10  - BMBx results pending.     # History of prostate cancer  S/p radical prostatectomy with positive margins. Completed radiation 5/2018.  - Continue Tolterodine    # Hx of hypogammaglobulinemia  Per notes, reported history of receiving IgG.     CARDS  # Incomplete RBBB  #Sinus pauses   Incomplete  RBB noted on EKG 10/2 with occasional PACs. On exam, he continues to have pauses, with telemetry showing sinus pauses and occasional PVCs.  He does have persistent dyspnea on exertion, which is unchanged with recent Lexiscan stress test with no inducible myocardial ischemia/infarction. TTE obtained 10/2. Hyperkinetic, EF 60-65%. No valvular abnormalities. Dizziness/flushing resolved, so suspect that was perhaps more related to infection, and no significant changes to tele, so will stop that for now and monitor.   - Discontinue Tele  - CTM for symptoms    DERM  # Livedo reticularis  Diffuse, most prominent on abdomen and back. No erythema, no concern for infection. Now improving over time   - Continue to monitor.      GI  #Elevated LFTs  Slow, mild increase over time after initiation of cefepime as an inpatient. No abdominal pain, or other symptoms. Suspect mild DILI, but if continues to up-trend, will pursue abdominal imaging (eg RUQUS vs CT A/P)  - CMP daily     # Nausea  Ongoing after chemotherapy. Allergy to Zofran.  - Continue scheduled Compazine TID.  - Continue Ativan, Compazine PRN.     # Constipation, improving  Recent stools have been harder over the past 3 days on admission. Now with normal BMs  - Continue PTA Psyllium.  - Continue Senna 1-2 tablets BID PRN.     Diet: Regular  DVT Prophylaxis: Heparin subcutaneous Q8H  Code Status: Full    Patient seen and plan of care discussed with attending physician, Dr. Washington.    Cristian Leon MD PhD  Heme/Onc/Transplant Fellow  [facility] [phone]      Interval History   - Continues to feel better  - PACs and PVCs on tele, no other abnormal events  - LFTs mildly trending up  - Light-headedness and flushing/sweats resolved    Physical Exam   Temp: 97.5  F (36.4  C) Temp src: Oral BP: 110/76 Pulse: 63   Resp: 18 SpO2: 100 % O2 Device: None (Room air)    Vitals:    10/01/20 0821 10/03/20 0930   Weight: 107.4 kg (236 lb 12.8 oz) 110.8 kg (244 lb 4.8 oz)      Vital Signs with Ranges  Temp:  [97.5  F (36.4  C)-98.2  F (36.8  C)] 97.5  F (36.4  C)  Pulse:  [63-84] 63  Resp:  [18] 18  BP: (110-131)/(71-83) 110/76  SpO2:  [96 %-100 %] 100 %  I/O last 3 completed shifts:  In: 2370 [P.O.:960; I.V.:1410]  Out: 3605 [Urine:3605]  General: Sitting up in bed, in NAD  Eyes: JOE, sclera anicteric   Nose/Mouth/Throat: Oropharynx with mild exudate, buccal mucosa moist, no ulcerations   Lungs: CTA bilaterally. No increased work of breathing. Breathing comfortably on room air  Cardiovascular: Occasional dropped beats, no M/R/G   Abdominal/Rectal: Obese, +BS, soft, distended, no guarding or rebound, no palpable masses  Lymphatics: Large palpable R inguinal LN, mildly tender to palpation. No palpable cervical or axillary LAD.   Skin: diffuse livedo reticularis on anterior abdomen and L flank, improving compared to prior.  No other rashes or wounds noted on examined areas of skin. BMBx dressing c/d/i.  Neuro: A&O - CN II-XII are intact, non focal, no tremor  Access: R port-a-cath accessed with no surrounding erythema, non-tender    Medications     - MEDICATION INSTRUCTIONS -         acyclovir  400 mg Oral BID     ceFEPIme (MAXIPIME) IV  2 g Intravenous Q8H     fluconazole  100 mg Oral Daily     [Held by provider] heparin ANTICOAGULANT  5,000 Units Subcutaneous Q8H     omeprazole  20 mg Oral Daily     prochlorperazine  5 mg Oral 4x Daily AC & HS     psyllium  1 packet Oral Daily     [START ON 10/5/2020] sulfamethoxazole-trimethoprim  1 tablet Oral Q Mon Tues BID     tolterodine ER  4 mg Oral Daily     vancomycin (VANCOCIN) IV  1,750 mg (central catheter) Intravenous Q12H     Data   Results for orders placed or performed during the hospital encounter of 09/30/20 (from the past 24 hour(s))   CBC with platelets differential   Result Value Ref Range    WBC 2.1 (L) 4.0 - 11.0 10e9/L    RBC Count 2.75 (L) 4.4 - 5.9 10e12/L    Hemoglobin 8.6 (L) 13.3 - 17.7 g/dL    Hematocrit 25.2 (L) 40.0 -  53.0 %    MCV 92 78 - 100 fl    MCH 31.3 26.5 - 33.0 pg    MCHC 34.1 31.5 - 36.5 g/dL    RDW 16.5 (H) 10.0 - 15.0 %    Platelet Count 62 (L) 150 - 450 10e9/L    Diff Method Manual Differential     % Neutrophils 59.1 %    % Lymphocytes 17.3 %    % Monocytes 13.6 %    % Eosinophils 9.1 %    % Basophils 0.9 %    Nucleated RBCs 2 (H) 0 /100    Absolute Neutrophil 1.2 (L) 1.6 - 8.3 10e9/L    Absolute Lymphocytes 0.4 (L) 0.8 - 5.3 10e9/L    Absolute Monocytes 0.3 0.0 - 1.3 10e9/L    Absolute Eosinophils 0.2 0.0 - 0.7 10e9/L    Absolute Basophils 0.0 0.0 - 0.2 10e9/L    Absolute Nucleated RBC 0.0     Anisocytosis Slight     Polychromasia Slight     Platelet Estimate Confirming automated cell count    Comprehensive metabolic panel   Result Value Ref Range    Sodium 139 133 - 144 mmol/L    Potassium 4.0 3.4 - 5.3 mmol/L    Chloride 106 94 - 109 mmol/L    Carbon Dioxide 30 20 - 32 mmol/L    Anion Gap 3 3 - 14 mmol/L    Glucose 100 (H) 70 - 99 mg/dL    Urea Nitrogen 10 7 - 30 mg/dL    Creatinine 1.05 0.66 - 1.25 mg/dL    GFR Estimate 78 >60 mL/min/[1.73_m2]    GFR Estimate If Black >90 >60 mL/min/[1.73_m2]    Calcium 8.0 (L) 8.5 - 10.1 mg/dL    Bilirubin Total 0.5 0.2 - 1.3 mg/dL    Albumin 2.9 (L) 3.4 - 5.0 g/dL    Protein Total 5.4 (L) 6.8 - 8.8 g/dL    Alkaline Phosphatase 132 40 - 150 U/L    ALT 72 (H) 0 - 70 U/L    AST 71 (H) 0 - 45 U/L   Uric acid   Result Value Ref Range    Uric Acid 3.2 (L) 3.5 - 7.2 mg/dL   Lactate Dehydrogenase   Result Value Ref Range    Lactate Dehydrogenase 474 (H) 85 - 227 U/L   Phosphorus   Result Value Ref Range    Phosphorus 3.5 2.5 - 4.5 mg/dL   Magnesium   Result Value Ref Range    Magnesium 2.2 1.6 - 2.3 mg/dL

## 2020-10-04 NOTE — PROGRESS NOTES
[facility]   [employer] (B[employer]ed [employer] for the [employer])  [facility] ([place]) 5c    Pt was blessed with healing oil for the sick by Father Yulisa.    Fr. Yulisa Peck   Pager [phone]

## 2020-10-04 NOTE — PLAN OF CARE
"\"a 54 year old male with PMH of follicular lymphoma who was admitted from clinic due to 2 days of fevers and chills. \" per Md note  A&Ox4. AVSS. Denied pain.Pt is on scheduled compazine 5 mg po; pt reported nausea is okay today. Good appetite.Up to bathroom independently; voided adequate amount. Pt reported loose  brown stool x3 today. Good appetite.Pt reported bilateral hand and lower arm cramping that went away after applying hot pack for about 20 minutes or so; pt stated that it is chronic cramping pain that comes and  goes. Pt is reinforced to discuss UE cramping with his provider in the morning.  Wife was at bed side; playing card game with pt.Continue with POC.  "

## 2020-10-04 NOTE — PLAN OF CARE
AVSS on RA. Pt remains on tele, sinus rhythm with frequent PACs and occasional PVCs noted. Pt reported back pain, managed well with 10mg oxycodone x1. Denies nausea. Pt inquiring about a loose tooth, reported having a root canal in that tooth previously and is wondering if that is the cause of his infection. No swelling noted. Labs stable, no replacements needed. Continue to monitor.     Problem: Adult Inpatient Plan of Care  Goal: Optimal Comfort and Wellbeing  Outcome: No Change     Problem: Infection  Goal: Infection Symptom Resolution  Outcome: No Change     Problem: Nausea and Vomiting  Goal: Fluid and Electrolyte Balance  Outcome: No Change

## 2020-10-05 VITALS
HEART RATE: 69 BPM | RESPIRATION RATE: 16 BRPM | SYSTOLIC BLOOD PRESSURE: 128 MMHG | OXYGEN SATURATION: 96 % | DIASTOLIC BLOOD PRESSURE: 75 MMHG | TEMPERATURE: 98.2 F | BODY MASS INDEX: 36.27 KG/M2 | WEIGHT: 238.5 LBS

## 2020-10-05 LAB
ALBUMIN SERPL-MCNC: 2.9 G/DL (ref 3.4–5)
ALP SERPL-CCNC: 135 U/L (ref 40–150)
ALT SERPL W P-5'-P-CCNC: 78 U/L (ref 0–70)
ANION GAP SERPL CALCULATED.3IONS-SCNC: 7 MMOL/L (ref 3–14)
ANISOCYTOSIS BLD QL SMEAR: SLIGHT
AST SERPL W P-5'-P-CCNC: 61 U/L (ref 0–45)
BASOPHILS # BLD AUTO: 0 10E9/L (ref 0–0.2)
BASOPHILS NFR BLD AUTO: 0 %
BILIRUB SERPL-MCNC: 0.5 MG/DL (ref 0.2–1.3)
BUN SERPL-MCNC: 11 MG/DL (ref 7–30)
CALCIUM SERPL-MCNC: 8.6 MG/DL (ref 8.5–10.1)
CHLORIDE SERPL-SCNC: 107 MMOL/L (ref 94–109)
CO2 SERPL-SCNC: 27 MMOL/L (ref 20–32)
COPATH REPORT: NORMAL
COPATH REPORT: NORMAL
CREAT SERPL-MCNC: 0.96 MG/DL (ref 0.66–1.25)
DIFFERENTIAL METHOD BLD: ABNORMAL
EOSINOPHIL # BLD AUTO: 0.3 10E9/L (ref 0–0.7)
EOSINOPHIL NFR BLD AUTO: 5.9 %
ERYTHROCYTE [DISTWIDTH] IN BLOOD BY AUTOMATED COUNT: 16.4 % (ref 10–15)
GFR SERPL CREATININE-BSD FRML MDRD: 87 ML/MIN/{1.73_M2}
GLUCOSE SERPL-MCNC: 97 MG/DL (ref 70–99)
HCT VFR BLD AUTO: 28.2 % (ref 40–53)
HGB BLD-MCNC: 9.3 G/DL (ref 13.3–17.7)
LDH SERPL L TO P-CCNC: 409 U/L (ref 85–227)
LYMPHOCYTES # BLD AUTO: 1.8 10E9/L (ref 0.8–5.3)
LYMPHOCYTES NFR BLD AUTO: 35.3 %
MAGNESIUM SERPL-MCNC: 2.1 MG/DL (ref 1.6–2.3)
MCH RBC QN AUTO: 31 PG (ref 26.5–33)
MCHC RBC AUTO-ENTMCNC: 33 G/DL (ref 31.5–36.5)
MCV RBC AUTO: 94 FL (ref 78–100)
MONOCYTES # BLD AUTO: 0.4 10E9/L (ref 0–1.3)
MONOCYTES NFR BLD AUTO: 8.4 %
NEUTROPHILS # BLD AUTO: 2.6 10E9/L (ref 1.6–8.3)
NEUTROPHILS NFR BLD AUTO: 50.4 %
PHOSPHATE SERPL-MCNC: 3.2 MG/DL (ref 2.5–4.5)
PLATELET # BLD AUTO: 102 10E9/L (ref 150–450)
POLYCHROMASIA BLD QL SMEAR: ABNORMAL
POTASSIUM SERPL-SCNC: 3.9 MMOL/L (ref 3.4–5.3)
PROT SERPL-MCNC: 5.5 G/DL (ref 6.8–8.8)
RBC # BLD AUTO: 3 10E12/L (ref 4.4–5.9)
SODIUM SERPL-SCNC: 141 MMOL/L (ref 133–144)
URATE SERPL-MCNC: 3.1 MG/DL (ref 3.5–7.2)
WBC # BLD AUTO: 5.1 10E9/L (ref 4–11)

## 2020-10-05 PROCEDURE — 84100 ASSAY OF PHOSPHORUS: CPT | Performed by: INTERNAL MEDICINE

## 2020-10-05 PROCEDURE — 250N000013 HC RX MED GY IP 250 OP 250 PS 637: Performed by: STUDENT IN AN ORGANIZED HEALTH CARE EDUCATION/TRAINING PROGRAM

## 2020-10-05 PROCEDURE — 84550 ASSAY OF BLOOD/URIC ACID: CPT | Performed by: INTERNAL MEDICINE

## 2020-10-05 PROCEDURE — 96372 THER/PROPH/DIAG INJ SC/IM: CPT | Performed by: STUDENT IN AN ORGANIZED HEALTH CARE EDUCATION/TRAINING PROGRAM

## 2020-10-05 PROCEDURE — 250N000011 HC RX IP 250 OP 636: Performed by: INTERNAL MEDICINE

## 2020-10-05 PROCEDURE — 250N000013 HC RX MED GY IP 250 OP 250 PS 637: Performed by: PHYSICIAN ASSISTANT

## 2020-10-05 PROCEDURE — 99207: CPT | Performed by: RADIOLOGY

## 2020-10-05 PROCEDURE — 80053 COMPREHEN METABOLIC PANEL: CPT | Performed by: INTERNAL MEDICINE

## 2020-10-05 PROCEDURE — 83615 LACTATE (LD) (LDH) ENZYME: CPT | Performed by: INTERNAL MEDICINE

## 2020-10-05 PROCEDURE — 85025 COMPLETE CBC W/AUTO DIFF WBC: CPT | Performed by: INTERNAL MEDICINE

## 2020-10-05 PROCEDURE — 36415 COLL VENOUS BLD VENIPUNCTURE: CPT | Performed by: INTERNAL MEDICINE

## 2020-10-05 PROCEDURE — 83735 ASSAY OF MAGNESIUM: CPT | Performed by: INTERNAL MEDICINE

## 2020-10-05 PROCEDURE — 250N000011 HC RX IP 250 OP 636: Performed by: STUDENT IN AN ORGANIZED HEALTH CARE EDUCATION/TRAINING PROGRAM

## 2020-10-05 PROCEDURE — 258N000003 HC RX IP 258 OP 636: Performed by: INTERNAL MEDICINE

## 2020-10-05 RX ORDER — LEVOFLOXACIN 250 MG/1
250 TABLET, FILM COATED ORAL DAILY
Qty: 30 TABLET | Refills: 0
Start: 2020-10-11 | End: 2020-10-18

## 2020-10-05 RX ORDER — TOLTERODINE 4 MG/1
4 CAPSULE, EXTENDED RELEASE ORAL DAILY
Qty: 30 CAPSULE | Refills: 0 | Status: SHIPPED | OUTPATIENT
Start: 2020-10-05 | End: 2020-11-04

## 2020-10-05 RX ORDER — PROCHLORPERAZINE MALEATE 5 MG
5 TABLET ORAL EVERY 8 HOURS PRN
Qty: 60 TABLET | Refills: 1 | Status: SHIPPED | OUTPATIENT
Start: 2020-10-05 | End: 2020-10-12

## 2020-10-05 RX ADMIN — OMEPRAZOLE 20 MG: 20 CAPSULE, DELAYED RELEASE ORAL at 08:46

## 2020-10-05 RX ADMIN — SULFAMETHOXAZOLE AND TRIMETHOPRIM 1 TABLET: 800; 160 TABLET ORAL at 08:46

## 2020-10-05 RX ADMIN — ACYCLOVIR 400 MG: 400 TABLET ORAL at 08:47

## 2020-10-05 RX ADMIN — AMOXICILLIN AND CLAVULANATE POTASSIUM 1 TABLET: 875; 125 TABLET, FILM COATED ORAL at 10:58

## 2020-10-05 RX ADMIN — HEPARIN SODIUM 5000 UNITS: 5000 INJECTION, SOLUTION INTRAVENOUS; SUBCUTANEOUS at 10:58

## 2020-10-05 RX ADMIN — PROCHLORPERAZINE MALEATE 5 MG: 5 TABLET ORAL at 11:02

## 2020-10-05 RX ADMIN — PSYLLIUM HUSK 1 PACKET: 3.4 POWDER ORAL at 08:46

## 2020-10-05 RX ADMIN — CEFEPIME HYDROCHLORIDE 2 G: 2 INJECTION, POWDER, FOR SOLUTION INTRAVENOUS at 06:24

## 2020-10-05 RX ADMIN — PROCHLORPERAZINE MALEATE 5 MG: 5 TABLET ORAL at 08:47

## 2020-10-05 RX ADMIN — HEPARIN SODIUM 5000 UNITS: 5000 INJECTION, SOLUTION INTRAVENOUS; SUBCUTANEOUS at 03:20

## 2020-10-05 RX ADMIN — FLUCONAZOLE 100 MG: 100 TABLET ORAL at 08:46

## 2020-10-05 RX ADMIN — TOLTERODINE TARTRATE 4 MG: 4 CAPSULE, EXTENDED RELEASE ORAL at 08:47

## 2020-10-05 RX ADMIN — VANCOMYCIN HYDROCHLORIDE 1750 MG: 10 INJECTION, POWDER, LYOPHILIZED, FOR SOLUTION INTRAVENOUS at 08:46

## 2020-10-05 RX ADMIN — ALTEPLASE 1 MG: 2.2 INJECTION, POWDER, LYOPHILIZED, FOR SOLUTION INTRAVENOUS at 05:31

## 2020-10-05 ASSESSMENT — ACTIVITIES OF DAILY LIVING (ADL)
ADLS_ACUITY_SCORE: 10

## 2020-10-05 NOTE — DISCHARGE SUMMARY
[facility]     Discharge Summary  Hematology / Oncology    Date of Admission:  9/30/2020  Date of Discharge:  10/05/2020  Discharging Provider: Pal Nicole PA-C  Date of Service (when I saw the patient): 10/05/2020    Discharge Diagnoses   Active Problems:    Sepsis (H)    Outpatient follow-up issues:  - dental work  - biopsy results and further treatment  -infection resolution    New discharge medications:  - augmentin for a total 10 day course    Next follow-up:  - Message sent to arrange MD follow up within one  Week of discharge.     Hospital Course   Juvenal Blake is an 55 year old male with PMH of follicular lymphoma who was admitted from clinic due to 2 days of fevers and chills. He was treated w cefepime, vancomycin given recent strep pharyngiitis and transitioned to augmentin on day of discharge. He stated some loose tooth at site of root canal may be source of infection and he will follow with a dentist on discharge. Notably, his livedo reticularis seemed to be improving (though still notable) on day of discharge. We advised patient to be watched for one day on oral antibiotics in hospital given condition, however he insisted on discharge. His wbc and platelets were improving on day of discharge.     He received biopsy 10/1 which is pending path on discharge, FC negative for cancer, and a brain MRI 10/1 was negative for mets. A 10/1 biopsy of inguinal lymph node was pending on day of discharge and will need follow up.     The following problems were addressed during this hospitalization:    Active Problems:    Sepsis (H)    Sepsis due to ?strep pharyngitis vs. Other oralpharyngeal or other infection  # Concern for bacteremia  Seen in ED (9/29) for abdominal pain, fevers (103) - rapid strep positive, given 1 dose PCN G, Ceftriaxone and discharged home. Seen in clinic (9/30) with persistent fevers, chills, mild nausea and inguinal lymphadenopathy. Given  one dose of Cefepime in clinic, then admitted due to concern for bacteremia with workup as below. Stable dry cough over past 1-2 months. No clear source defined aside from strep, and overall improving, with reduced rash. He did state some loose tooth issue, but without swelling, pus, pain. He will follow with a dentist outpatient.     will plan for 10D course of abx, with transition to augmentin on day of discharge    Diagnostic:  - COVID (9/15, 9/29) negative   - UA (9/29) unremarkable.  - CXR (9/29) unremarkable.  - BCx (9/30 0000, 9/30 2330) peripheral, port NGTD.   - CMV undetectable  - Throat culture negative  - Sputum culture cancelled due to squamous cells; will hold off on further cultures     Therapeutic  - switched to augmentin 10/5 for a 10 day course of antibiotics on discharge     # Infectious Prophylaxis  - Continue Acyclovir.  - Continue Fluconazole.  - Continue Bactrim Monday, Tuesday BID.      HEME/ONC  # Follicular lymphoma s/p NAM-NK cell therapy (9/1), concern for relapse vs transformation  Follows with Dr. Chan. Initially diagnosed with grade I-II follicular lymphoma in 2010, stage IV at diagnosis with marrow involvement.   - 2010 - Bendamustine/Rituxan, achieved CR.   - 2010 - 2012 - Maintenance Rituxan.   - 12/2016 - Relapsed, again with low-grade follicular lymphoma. Treated with Bendamustine/Rituxan, achieved CR.   - 2017 - 4/2019 -  Maintenance Rituxan  - 1/19 - Relapsed, again with low-grade follicular lymphoma. Treated with O-CHOP, achieved CR.  - 4/20 - Imaging with concerning adenopathy. Axillary node biopsy positive for grade I-II follicular lymphoma, expressed CD19/CD20. Ki-67 20%.  - 7/20 - BMBx with inimal histologic evidence of paratrabecular low grade lymphoma (comprising less than 5% of overall marrow cellularity). 0.6% CD10-positive kappa monotypic B cells, negative for IGH-BCL2 fusion.  - 9/1/20 - Day 0 NAM-NK cell therapy. Today is Day +30.   - 9/28/20 - PET/CT with  overall increased hypermetabolic adenopathy in the abdomen and pelvis, as well as increased lymphomatous infiltration of soft tissue in the central mesentery. Increased splenomegaly, new/worsening hypermetabolic deposits in the retroperitoneal fat. Concern for rapid progression of lymphoma.   - S/p IR-guided biopsy of right inguinal lymph node (10/1) - path, flow, FISH pending.  - BMBx (10/1) due to worsening pancytopenia with concern for marrow involvement as above - Morphology, Cytogenetics, FISH pending.    - FLOW negative for cancer.   - Brain MRI (10/1) negative for acute intracranial pathology, lymphoma involvement.      # Pancytopenia, improving  Secondary to chemotherapy, malignancy with concern for lymphomatous marrow involvement. Now improving, and actually suspect that initial down-trend may have been related to acute infection, although biopsy pending as above     # History of prostate cancer  S/p radical prostatectomy with positive margins. Completed radiation 5/2018.  - Continue Tolterodine     # Hx of hypogammaglobulinemia  Per notes, reported history of receiving IgG.      CARDS  # Incomplete RBBB  #Sinus pauses   Incomplete RBB noted on EKG 10/2 with occasional PACs. On exam, he continues to have pauses, with telemetry showing sinus pauses and occasional PVCs.  He does have persistent dyspnea on exertion, which is unchanged with recent Lexiscan stress test with no inducible myocardial ischemia/infarction. TTE obtained 10/2. Hyperkinetic, EF 60-65%. No valvular abnormalities. Dizziness/flushing resolved, so suspect that was perhaps more related to infection, and no significant changes to tele, so will stop that for now and monitor.      DERM  # Livedo reticularis  Diffuse, most prominent on abdomen and back. No erythema, no concern for infection. Now improving over time     GI     # Nausea (improving)  Ongoing after chemotherapy. Allergy to Zofran.  - Continue compazine PRN on discharge       Pal  ARIS Nicole PA-C    Pending Results   These results will be followed up   Unresulted Labs Ordered in the Past 30 Days of this Admission     Date and Time Order Name Status Description    10/2/2020 0020 Blood culture Preliminary     10/1/2020 2358 Blood culture Preliminary     10/1/2020 1553 FISH In process     10/1/2020 1113 Process and hold DNA In process     10/1/2020 1113 FISH With Professional Interpretation In process     10/1/2020 1113 CHROMOSOME BONE MARROW With Professional Interpretation In process     10/1/2020 1113 Bone marrow biopsy In process     10/1/2020 0954 Surgical pathology exam - Lymph Node Biopsy In process     9/30/2020 1943 Blood culture Preliminary     9/30/2020 1850 Blood culture Preliminary     9/30/2020 1629 Blood culture Preliminary     9/29/2020 2301 Blood culture Preliminary     9/29/2020 2301 Blood culture Preliminary     9/3/2020 0005 DNA marker post bmt engraft bld In process           Primary Care Physician   Cole Sandoval    Physical Exam:    Blood pressure 128/75, pulse 69, temperature 98.2  F (36.8  C), temperature source Oral, resp. rate 16, weight 108.2 kg (238 lb 8 oz), SpO2 96 %.  General: Sitting up in bed, in NAD  Eyes: JOE, sclera anicteric   Nose/Mouth/Throat: Oropharynx with mild exudate, buccal mucosa moist, no ulcerations   Lungs: CTA bilaterally. No increased work of breathing. Breathing comfortably on room air  Cardiovascular: Occasional dropped beats, no M/R/G   Abdominal/Rectal: Obese, +BS, soft, distended, no guarding or rebound, no palpable masses  Lymphatics: Large palpable R inguinal LN, mildly tender to palpation. No palpable cervical or axillary LAD.   Skin: diffuse livedo reticularis on anterior abdomen and L flank, improving compared to prior.  No other rashes or wounds noted on examined areas of skin. BMBx dressing c/d/i.  Neuro: A&O - CN II-XII are intact, non focal, no tremor  Access: R port-a-cath accessed with no surrounding erythema,  non-tender    Time Spent on this Encounter   I, Pal Nicole PA-C, personally saw the patient today and spent greater than 30 minutes discharging this patient.    Discharge Disposition   Discharged to home  Condition at discharge: Stable    Consultations This Hospital Stay   PHARMACY TO [facility]  INTERVENTIONAL RADIOLOGY ADULT/PEDS IP CONSULT  DENTAL HYGIENE IP ADULT CONSULT - [facility]    Discharge Orders      Reason for your hospital stay    You came into the hospital with an infection, thought to be related to mouth infection, and improved on antibiotics. Finish your course of augmentin (antibiotic) on discharge starting 10/5 PM.     You will need to closely follow up with your doctor to follow the biopsy results that are not yet back on time of discharge. This was requested for you but if you do not hear of an appointment in 2 days call cancer triage number [phone] to confirm appointment within 7 days of discharge by 10/12.       Already scheduled appointments are listed below.     Adult [facility] Follow-up and recommended labs and tests    Follow up with Dr. Perales within a week for following your results from your biopsies!!!!    Appointments on [place] and/or [facility] (with [facility] or [facility] provider or service). Call [phone] if you haven't heard regarding these appointments within 2 days of discharge.     Activity    Your activity upon discharge: activity as tolerated     Diet    Follow this diet upon discharge: Regular     Discharge Medications   Current Discharge Medication List      START taking these medications    Details   amoxicillin-clavulanate (AUGMENTIN) 875-125 MG tablet Take 1 tablet by mouth every 12 hours for 5 days  Qty: 10 tablet, Refills: 0    Associated Diagnoses: Sepsis, due to unspecified organism, unspecified whether acute organ dysfunction present (H)         CONTINUE these medications which have CHANGED    Details   prochlorperazine (COMPAZINE) 5 MG tablet Take 1  tablet (5 mg) by mouth every 8 hours as needed for nausea or vomiting  Qty: 60 tablet, Refills: 1    Associated Diagnoses: Follicular lymphoma grade i, lymph nodes of multiple sites (H)      tolterodine ER (DETROL LA) 4 MG 24 hr capsule Take 1 capsule (4 mg) by mouth daily  Qty: 30 capsule, Refills: 0    Associated Diagnoses: Urinary incontinence, unspecified type         CONTINUE these medications which have NOT CHANGED    Details   acyclovir (ZOVIRAX) 400 MG tablet Take 1 tablet (400 mg) by mouth every 12 hours  Qty: 60 tablet, Refills: 2    Associated Diagnoses: Follicular lymphoma grade i, lymph nodes of multiple sites (H)      fluconazole (DIFLUCAN) 100 MG tablet Take 1 tablet (100 mg) by mouth daily  Qty: 30 tablet, Refills: 1    Associated Diagnoses: Follicular lymphoma grade i, lymph nodes of multiple sites (H)      omeprazole (PRILOSEC) 20 MG DR capsule Take 1 capsule (20 mg) by mouth daily  Qty:        Psyllium (METAMUCIL FIBER) 51.7 % PACK Take 1 packet by mouth daily       sulfamethoxazole-trimethoprim (BACTRIM DS) 800-160 MG tablet Take 1 tablet by mouth Every Mon, Tues two times daily  Qty: 16 tablet, Refills: 4    Associated Diagnoses: Follicular lymphoma grade i, lymph nodes of multiple sites (H)      hydrOXYzine (ATARAX) 50 MG tablet Take 1 tablet (50 mg) by mouth 3 times daily as needed for itching  Qty: 30 tablet, Refills: 0    Associated Diagnoses: Follicular lymphoma grade i, lymph nodes of multiple sites (H)      levofloxacin (LEVAQUIN) 250 MG tablet Take 1 tablet (250 mg) by mouth daily  Qty: 30 tablet, Refills: 0    Associated Diagnoses: Follicular lymphoma grade i, lymph nodes of multiple sites (H)      LORazepam (ATIVAN) 0.5 MG tablet Take 1 tablet (0.5 mg) by mouth every 4 hours as needed (Anxiety, Nausea/Vomiting or Sleep)  Qty: 30 tablet, Refills: 0    Associated Diagnoses: Follicular lymphoma grade i, lymph nodes of multiple sites (H)      medical cannabis (Patient's own supply)           STOP taking these medications       oxyCODONE (ROXICODONE) 5 MG tablet Comments:   Reason for Stopping:             Allergies   Allergies   Allergen Reactions     Ondansetron Other (See Comments) and Itching     Data   Most Recent 3 CBC's:  Recent Labs   Lab Test 10/05/20  0447 10/04/20  0357 10/03/20  0414   WBC 5.1 2.1* 1.0*   HGB 9.3* 8.6* 9.3*   MCV 94 92 92   * 62* 59*      Most Recent 3 BMP's:  Recent Labs   Lab Test 10/05/20  0447 10/04/20  0357 10/03/20  0414    139 134   POTASSIUM 3.9 4.0 3.7   CHLORIDE 107 106 102   CO2 27 30 25   BUN 11 10 10   CR 0.96 1.05 1.09   ANIONGAP 7 3 7   TRE 8.6 8.0* 8.1*   GLC 97 100* 115*     Most Recent 2 LFT's:  Recent Labs   Lab Test 10/05/20  0447 10/04/20  0357   AST 61* 71*   ALT 78* 72*   ALKPHOS 135 132   BILITOTAL 0.5 0.5     Most Recent INR's and Anticoagulation Dosing History:  Anticoagulation Dose History     Recent Dosing and Labs Latest Ref Rng & Units 7/16/2020 9/1/2020    INR 0.86 - 1.14 1.09 1.04        Most Recent 3 Troponin's:No lab results found.  Most Recent Cholesterol Panel:No lab results found.  Most Recent 6 Bacteria Isolates From Any Culture (See EPIC Reports for Culture Details):  Recent Labs   Lab Test 10/02/20  1220 10/02/20  0029 10/02/20  0000 09/30/20  2322 09/30/20 2029 09/30/20 2014   CULT Canceled, Test credited  >10 Squamous epithelial cells/low power field indicates oral contamination. Please   recollect.  *  Notification of test cancellation was given to  MATT ORTIZ RN [id number] 10.2.20 NDP   No growth after 3 days No growth after 3 days Light growth  Normal sarwat   No growth after 5 days Canceled, Test credited  >10 Squamous epithelial cells/low power field indicates oral contamination. Please   recollect.  *  Notification of test cancellation was given to  Sushila Gaspar RN on 10/1/2020 at 0253. LSA        Most Recent TSH, T4 and A1c Labs:No lab results found.  Results for orders placed or performed during the  hospital encounter of 09/30/20   XR Chest 2 Views    Narrative    EXAM: XR CHEST 2 VW  9/30/2020 7:09 PM     HISTORY:  fever in patient on chemo for follicular lymphoma       COMPARISON:  Chest x-ray 9/29/2020    FINDINGS: PA and lateral radiographs of the chest. Right chest wall  Port-A-Cath with the tip overlying the mid/lower SVC. Stable  cardiomediastinal silhouette. The pulmonary vasculature is distinct.  No pneumothorax or pleural effusion. No focal airspace opacity. The  visualized upper abdomen is unremarkable.      Impression    IMPRESSION: No focal airspace disease.    I have personally reviewed the examination and initial interpretation  and I agree with the findings.    GRACIELA RAMIRES MD   IR Lymph Node Biopsy    Narrative    Procedures:   1. Ultrasound-guided right groin lymph node biopsy.    Clinical indication: This is a patient with follicular lymphoma.    Comparison studies: PET CT from July 25, 2020.    PROCEDURE:        Interventional radiologists: WING Martinez MD (IR staff)    Consent: verbal and written informed consent obtained prior to  procedure.    Procedure details: Patient placed in supine position. The right groin  was prepped and draped in standard sterile fashion. Using ultrasound  guidance, a 17-gauge coaxial needle was advanced into one of the lymph  nodes. 6 18-gauge biopsies were taken and placed in formalin, RPMI,  and fresh saline. Pathology presence was requested, however, they  refused.      Medications:1% lidocaine (buffered with 8.4% sodium bicarbonate) for  local anesthesia.     Monitoring: The patient was placed on continuous monitoring. Vital  signs and sedation monitored by nursing staff under my supervision.  The patient remained stable throughout the procedure.    Complications: None.      Impression    IMPRESSION:   Successful right groin lymph node biopsy as above.    PLAN:  Pathology pending.    I, RAI MARTINEZ MD, attest that I was present in the procedure  room  for the entire procedure.    RAI LUCIO MD   MR Brain w/o & w Contrast    Narrative     MR BRAIN W/O & W CONTRAST 10/1/2020 5:37 PM    Provided History: Hx of follicular lymphoma, concern for  transformation, new headaches.    Comparison: Whole-body PET/CT 2020..    Technique: Multiplanar T1-weighted, axial FLAIR, and susceptibility  images were obtained without intravenous contrast. Following  intravenous gadolinium-based contrast administration, axial  T2-weighted, diffusion, and T1-weighted images (in multiple planes)  were obtained.    Contrast: 10mL Gadavist     Findings:  There is no mass effect, midline shift, or evidence of intracranial  hemorrhage. The ventricles are proportionate to the cerebral sulci.  Diffusion-weighted images reveal no abnormal reduced diffusion.   Normal major vascular intracranial flow-voids.    Postcontrast images demonstrate no abnormal intracranial enhancement.    No abnormality of the skull marrow signal. The visualized portions of  paranasal sinuses are relatively clear. Trace mastoid effusions  bilaterally. The orbits are grossly unremarkable.      Impression    Impression:   Unremarkable brain MRI. No evidence for intracranial spread of  disease.    I have personally reviewed the examination and initial interpretation  and I agree with the findings.    BHAVIK HI MD   Echo Limited    Narrative    [id number]  [id number]  [id number]  [id number]^CHAPINCITO^BONILLA           [facility],[place]  Echocardiography Laboratory  [address] [place] [id number]     Name: TARA BERTRAND  MRN: [id number]  : 1965  Study Date: 10/02/2020 02:16 PM  Age: 55 yrs  Gender: Male  Patient Location: [facility]  Reason For Study: BBB  Ordering Physician: BONILLA BECKHAM  Referring Physician: ANNI FRIEDMAN  Performed By: GILA Holliday     BSA: 2.2 m2  Height: 68 in  Weight: 236 lb  HR: 77  BP: 102/74  mmHg  _____________________________________________________________________________  __        Procedure  Limited Portable Echo Adult. Contrast Optison. Patient was given 5 ml mixture  of 3 ml Optison and 6 ml saline. 4 ml wasted.  _____________________________________________________________________________  __        Interpretation Summary  Global and regional left ventricular function is hyperkinetic with an EF of  65-70%.  Right ventricular function, chamber size, wall motion, and thickness are  normal.  No significant valvular abnormalities were noted.  No pericardial effusion is present.     This study was compared with the study from 7/20/2020 .There has been no  change.  _____________________________________________________________________________  __        Left Ventricle  Global and regional left ventricular function is hyperkinetic with an EF of  65-70%. Left ventricular size is normal. Left ventricular wall thickness is  normal. Left ventricular diastolic function is indeterminate.     Right Ventricle  Right ventricular function, chamber size, wall motion, and thickness are  normal.     Atria  The atria cannot be assessed.     Mitral Valve  The mitral valve is normal.        Aortic Valve  Aortic valve is normal in structure and function. The aortic valve is  tricuspid.     Tricuspid Valve  The tricuspid valve is normal. Trace tricuspid insufficiency is present. The  right ventricular systolic pressure is approximated at 28.9 mmHg plus the  right atrial pressure.     Pulmonic Valve  The pulmonic valve is normal.     Vessels  The aorta root is normal. Dilation of the inferior vena cava is present with  normal respiratory variation in diameter. IVC diameter and respiratory changes  fall into an intermediate range suggesting an RA pressure of 8 mmHg.     Pericardium  No pericardial effusion is present.        Compared to Previous Study  This study was compared with the study from 7/20/2020 . There has been  no  change.  _____________________________________________________________________________  __  MMode/2D Measurements & Calculations  IVSd: 0.72 cm     LVIDd: 6.2 cm  LVIDs: 4.0 cm  LVPWd: 0.74 cm  FS: 34.3 %  LV mass(C)d: 173.0 grams  LV mass(C)dI: 78.9 grams/m2  LVOT diam: 2.3 cm  LVOT area: 4.2 cm2     EF(MOD-bp): 68.6 %  RWT: 0.24        Doppler Measurements & Calculations  MV E max senthil: 105.0 cm/sec  MV A max senthil: 88.8 cm/sec  MV E/A: 1.2  MV dec slope: 496.0 cm/sec2  Ao V2 max: 208.0 cm/sec  Ao max P.3 mmHg  Ao V2 mean: 143.0 cm/sec  Ao mean P.0 mmHg  Ao V2 VTI: 35.5 cm  SHANNON(I,D): 2.8 cm2  SHANNON(V,D): 3.0 cm2  LV V1 max P.9 mmHg  LV V1 max: 149.0 cm/sec  LV V1 VTI: 24.2 cm  SV(LVOT): 100.5 ml  SI(LVOT): 45.9 ml/m2  TR max senthil: 269.0 cm/sec  TR max P.9 mmHg  AV Senthil Ratio (DI): 0.72  SHANNON Index (cm2/m2): 1.3     E/E' av.0  Lateral E/e': 8.3  Medial E/e': 9.6     _____________________________________________________________________________  __           Report approved by: Lexii HERMAN 10/02/2020 04:13 PM

## 2020-10-05 NOTE — PLAN OF CARE
Problem: Adult Inpatient Plan of Care  Goal: Plan of Care Review  Outcome: Improving  Flowsheets (Taken 10/5/2020 0399)  Plan of Care Reviewed With: patient  VSS x for irregular heart beat. Denies pain/nausea. Appetite good. Loose stools continue (x2/shift, not saving), Scheduled antiemetics given. Mild SAMS. Antibiotics chg to PO. Discharge order written and will leave later this afternoon.

## 2020-10-05 NOTE — CONSULTS
Dental Hygiene Consult Service        Juvenal Blake MRN# [id number]   YOB: 1965 Age: 55 year old     Date of Admission: 2020  PCP is Cole Sandoval  Date of Service: 10/5/2020           Reason for Consult:   Referring MD & Reason for Visit: I was asked by Gerardo Nicole PA-C, to see Juvenal Blake for mobile tooth.           History of Present Illness:   This patient is a 55 year old male with a history of nonHodgkin's lymphoma who presents with sepsis. Dental and oropharyngeal history is pertinent for N/A.  The patient reports concern for mobile tooth  The patient does not have a history of aspiration.    The patient /does not have a history of dentures or dental appliances.  Current oral products and cares performed by the patient and/or nursing include:  Daily brushing by patient.              Past Medical History:     Past Medical History:   Diagnosis Date     Cancer (H)      Non Hodgkin's lymphoma (H)      Shortness of breath                Social History:     Social History     Tobacco Use     Smoking status: Former Smoker     Quit date: 1995     Years since quittin.3     Smokeless tobacco: Never Used   Substance Use Topics     Alcohol use: Yes     Frequency: 4 or more times a week     Drinks per session: 3 or 4     Drug use: Yes     Types: Marijuana              Medications:   No current facility-administered medications on file prior to encounter.        acyclovir (ZOVIRAX) 400 MG tablet, Take 1 tablet (400 mg) by mouth every 12 hours       fluconazole (DIFLUCAN) 100 MG tablet, Take 1 tablet (100 mg) by mouth daily       omeprazole (PRILOSEC) 20 MG DR capsule, Take 1 capsule (20 mg) by mouth daily       Psyllium (METAMUCIL FIBER) 51.7 % PACK, Take 1 packet by mouth daily        sulfamethoxazole-trimethoprim (BACTRIM DS) 800-160 MG tablet, Take 1 tablet by mouth Every Mon, Tues two times daily       hydrOXYzine (ATARAX) 50 MG tablet, Take 1 tablet (50 mg) by mouth 3 times daily  as needed for itching (Patient not taking: Reported on 9/8/2020)       LORazepam (ATIVAN) 0.5 MG tablet, Take 1 tablet (0.5 mg) by mouth every 4 hours as needed (Anxiety, Nausea/Vomiting or Sleep)       medical cannabis (Patient's own supply),               Physical Exam:   Head and neck exam: asymmetrical cervical swelling on left side of neck; patient reported no pain.    Soft Tissue exam:  Within normal limits    Hard Tissue exam:  #28 mobility           Assessment and Plan:   Assessment:  This patient is a 55 year old male with a history of nonhodgkin's lymphoma who presents with sepsis, oral exam concerning for oral infection.      Assessment and Plan:  #1. #28 mobility   -  Recommended workup includes none  -  General Practice Dentistry referral recommended? No  -  Oral and Maxillofacial Surgery referral recommended? No    Oral Care Plan:    - twice daily brushing and flossing  -  Discussed importance of follow up with his home dentist ASAP to assess mobility of #28. Patient reported he has a recall appointment at the end of the month. We discussed that he may want to call to get in sooner.  Noemí Rivers  Pager [phone]

## 2020-10-05 NOTE — PLAN OF CARE
Neuro: A&Ox4.   Cardiac:  VSS.   Respiratory: Sating WNL on RA.  GI/: Adequate urine output.   Diet/appetite: Tolerating regular diet.   Activity: Up independently  Pain: Denies  Skin: No new deficits noted.  LDA's: SL devante-cath without blood return. TPA instilled with good return.    Plan: Continue with POC. Notify primary team with changes.

## 2020-10-06 ENCOUNTER — PATIENT OUTREACH (OUTPATIENT)
Dept: ONCOLOGY | Facility: CLINIC | Age: 55
End: 2020-10-06

## 2020-10-06 DIAGNOSIS — C82.08 FOLLICULAR LYMPHOMA GRADE I, LYMPH NODES OF MULTIPLE SITES (H): Primary | ICD-10-CM

## 2020-10-06 LAB
BACTERIA SPEC CULT: NO GROWTH
COPATH REPORT: NORMAL
Lab: NORMAL
SPECIMEN SOURCE: NORMAL

## 2020-10-06 NOTE — PROGRESS NOTES
Patient discharged on 10/5/20, please follow up per TCM workflow.    Carmela Chamberlain CMA    POST HOSPITAL DISCHARGE FOLLOW-UP PHONE CALL    Follow-Up Type: Hospital Discharge - Follow-Up Call  Date of Admission: 9/30/20  Date of Discharge: 10/05/20  Discharge Diagnosis: Sepsis  Next BMT Follow-up Visit: 10/8/20  Discharging Provider: Felix  Primary BMT Physician: Shayna    Summary of Encounter:  Contacted patient via phone to conduct follow-up assessment post recent hospital discharge. Patient verbalized that they have received and understand written post discharge care instructions, have  a current medication list as well as contact phone numbers.  Patient understands to call BMT office @ [phone] during office hours Mon-Fri 8am-4:30pm, and [phone] after hours to report symptoms.  Patient verbalized that they have all necessary medications, have no questions regarding their administration instructions and is currently taking them without difficulty.  Patient was able to verbalize next follow-up visit with BMT Provider Dr. Corral on 10/8/2020.  Patient does not report any new symptoms or concerns and has no further questions at this time.

## 2020-10-08 ENCOUNTER — RECORDS - HEALTHEAST (OUTPATIENT)
Dept: ADMINISTRATIVE | Facility: OTHER | Age: 55
End: 2020-10-08

## 2020-10-08 ENCOUNTER — APPOINTMENT (OUTPATIENT)
Dept: LAB | Facility: CLINIC | Age: 55
End: 2020-10-08
Attending: INTERNAL MEDICINE
Payer: COMMERCIAL

## 2020-10-08 ENCOUNTER — ONCOLOGY VISIT (OUTPATIENT)
Dept: TRANSPLANT | Facility: CLINIC | Age: 55
End: 2020-10-08
Attending: INTERNAL MEDICINE
Payer: COMMERCIAL

## 2020-10-08 VITALS
OXYGEN SATURATION: 98 % | WEIGHT: 241 LBS | BODY MASS INDEX: 36.65 KG/M2 | RESPIRATION RATE: 16 BRPM | HEART RATE: 65 BPM | DIASTOLIC BLOOD PRESSURE: 71 MMHG | SYSTOLIC BLOOD PRESSURE: 119 MMHG | TEMPERATURE: 97.8 F

## 2020-10-08 DIAGNOSIS — C82.08 FOLLICULAR LYMPHOMA GRADE I, LYMPH NODES OF MULTIPLE SITES (H): ICD-10-CM

## 2020-10-08 LAB
ALBUMIN SERPL-MCNC: 3.4 G/DL (ref 3.4–5)
ALP SERPL-CCNC: 126 U/L (ref 40–150)
ALT SERPL W P-5'-P-CCNC: 89 U/L (ref 0–70)
ANION GAP SERPL CALCULATED.3IONS-SCNC: 6 MMOL/L (ref 3–14)
AST SERPL W P-5'-P-CCNC: 45 U/L (ref 0–45)
BACTERIA SPEC CULT: NO GROWTH
BACTERIA SPEC CULT: NO GROWTH
BASOPHILS # BLD AUTO: 0 10E9/L (ref 0–0.2)
BASOPHILS NFR BLD AUTO: 0.2 %
BILIRUB SERPL-MCNC: 0.4 MG/DL (ref 0.2–1.3)
BUN SERPL-MCNC: 13 MG/DL (ref 7–30)
CALCIUM SERPL-MCNC: 8.8 MG/DL (ref 8.5–10.1)
CHLORIDE SERPL-SCNC: 108 MMOL/L (ref 94–109)
CO2 SERPL-SCNC: 27 MMOL/L (ref 20–32)
CREAT SERPL-MCNC: 1.06 MG/DL (ref 0.66–1.25)
DIFFERENTIAL METHOD BLD: ABNORMAL
EOSINOPHIL # BLD AUTO: 0.1 10E9/L (ref 0–0.7)
EOSINOPHIL NFR BLD AUTO: 2.1 %
ERYTHROCYTE [DISTWIDTH] IN BLOOD BY AUTOMATED COUNT: 16.2 % (ref 10–15)
GFR SERPL CREATININE-BSD FRML MDRD: 77 ML/MIN/{1.73_M2}
GLUCOSE SERPL-MCNC: 106 MG/DL (ref 70–99)
HCT VFR BLD AUTO: 30.3 % (ref 40–53)
HGB BLD-MCNC: 9.7 G/DL (ref 13.3–17.7)
IMM GRANULOCYTES # BLD: 0 10E9/L (ref 0–0.4)
IMM GRANULOCYTES NFR BLD: 0.2 %
LYMPHOCYTES # BLD AUTO: 2.6 10E9/L (ref 0.8–5.3)
LYMPHOCYTES NFR BLD AUTO: 50.7 %
MCH RBC QN AUTO: 30.9 PG (ref 26.5–33)
MCHC RBC AUTO-ENTMCNC: 32 G/DL (ref 31.5–36.5)
MCV RBC AUTO: 97 FL (ref 78–100)
MONOCYTES # BLD AUTO: 0.6 10E9/L (ref 0–1.3)
MONOCYTES NFR BLD AUTO: 11 %
NEUTROPHILS # BLD AUTO: 1.9 10E9/L (ref 1.6–8.3)
NEUTROPHILS NFR BLD AUTO: 35.8 %
NRBC # BLD AUTO: 0 10*3/UL
NRBC BLD AUTO-RTO: 0 /100
PLATELET # BLD AUTO: 208 10E9/L (ref 150–450)
PLATELET # BLD EST: ABNORMAL 10*3/UL
POTASSIUM SERPL-SCNC: 3.9 MMOL/L (ref 3.4–5.3)
PROT SERPL-MCNC: 6.2 G/DL (ref 6.8–8.8)
RBC # BLD AUTO: 3.14 10E12/L (ref 4.4–5.9)
SODIUM SERPL-SCNC: 140 MMOL/L (ref 133–144)
SPECIMEN SOURCE: NORMAL
SPECIMEN SOURCE: NORMAL
WBC # BLD AUTO: 5.2 10E9/L (ref 4–11)

## 2020-10-08 PROCEDURE — 80053 COMPREHEN METABOLIC PANEL: CPT | Performed by: PATHOLOGY

## 2020-10-08 PROCEDURE — 250N000011 HC RX IP 250 OP 636: Performed by: INTERNAL MEDICINE

## 2020-10-08 PROCEDURE — G0463 HOSPITAL OUTPT CLINIC VISIT: HCPCS

## 2020-10-08 PROCEDURE — 99215 OFFICE O/P EST HI 40 MIN: CPT | Performed by: INTERNAL MEDICINE

## 2020-10-08 PROCEDURE — 85025 COMPLETE CBC W/AUTO DIFF WBC: CPT | Performed by: PATHOLOGY

## 2020-10-08 PROCEDURE — 36591 DRAW BLOOD OFF VENOUS DEVICE: CPT

## 2020-10-08 RX ORDER — HEPARIN SODIUM (PORCINE) LOCK FLUSH IV SOLN 100 UNIT/ML 100 UNIT/ML
5 SOLUTION INTRAVENOUS EVERY 8 HOURS PRN
Status: DISCONTINUED | OUTPATIENT
Start: 2020-10-08 | End: 2020-10-12 | Stop reason: HOSPADM

## 2020-10-08 RX ADMIN — Medication 5 ML: at 14:14

## 2020-10-08 ASSESSMENT — PAIN SCALES - GENERAL: PAINLEVEL: MILD PAIN (2)

## 2020-10-08 NOTE — NURSING NOTE
Chief Complaint   Patient presents with     Blood Draw     Labs drawn via PORT by RN in lab. VS taken.     Jodi Thorne RN

## 2020-10-08 NOTE — LETTER
10/8/2020         RE: Juvenal Blake  [address] [place] [address]        Dear Colleague,    Thank you for referring your patient, Juvenal Blake, to the [facility] [place]. Please see a copy of my visit note below.    HISTORY OF PRESENT ILLNESS:  I saw Mr. Juvenal Blake today, who is currently approximately 30 days status post haplo NK NAM therapy for progressive follicular lymphoma, grade 2A.  He was recently hospitalized for 4-5 days for a fever.  He tested positive for strep in the ER.  Blood cultures remain negative.  He was treated first with cefepime and then eventually switched to Augmentin and discharged.  He recently had a PET/CT scan performed 09/30 that showed clear evidence of progression with new adenopathy in hypermetabolism as well as increase in size of previously noted lesions.  He underwent an excisional biopsy of a palpable right inguinal node, which showed persistent follicular lymphoma but with a significant amount of grade 3 pathology.  There was no evidence of a diffuse large B-cell transformation.  The core was quite large, measuring 1.2 cm x 0.5.  Flow confirmed that there were clonal B cells that were strongly positive for CD20 and also for CD10, BCL6, BCL2.  Ki-67 positive rate was 20%-30%.  A repeat bone marrow biopsy showed no morphologic or immunophenotypic evidence of B-cell lymphoma and was 30%-40% cellular.  Prior to the NAM NK therapy, he had easily detectable involvement with follicular lymphoma.      Since discharge, Juvenal says he has had no night sweats, fevers, chills, nausea, vomiting, diarrhea, cough, hemoptysis, shortness of breath, hematuria, dysuria, bruising or bleeding.  He does have some loose stools.  He is still on Augmentin and will finish in a couple of days.  The remainder of the 10-point review of systems is negative.      PHYSICAL EXAMINATION:   GENERAL:  He looked healthy.   VITAL SIGNS:   Unremarkable.   HEENT:  Sclerae were anicteric and noninjected.   LYMPH:  He had a palpable rather large node in the right inguinal area.   SKIN:  Looked normal.     EYES:  Eyes were non-injected and there was no discharge.   NEUROLOGIC:  He was alert and oriented.      LABORATORY DATA:  Labs today showed a white count of 5200, hemoglobin 9.7, platelets 208,000.  Electrolyte panel was normal and ALT was slightly elevated at 93, AST at 45, alkaline phosphatase was 125, bilirubin 0.39.      ASSESSMENT AND PLAN:  I spent a total of 40 minutes today face-to-face with Mr. Blake and his wife, ofpanelconference of BMT attendings with a special interest in cellularon Monday to see if he is eligible for other cellular-based therapy.  In particular, I am thinking of a study (2017-31)  which uses a combination of Rituxan with a CAR T transfected with a high-affinity anti-CD16 structure such that the CAR Ts are specifically targeted towards malignant cells.  We also have available Kymriah and Yescarta CAR , although this is typically done for diffuse large B-cell lymphoma. I counseled Mr. Blake and his wife about the rationale, risks and benefits of CAR-T therapy and the possibilities of lethal CRS and neurotoxicity In the interim. I answered appropriate questions to the best of my capacities. I recommend that Dr. Crooks contact the patient and start him on idelalisib, a PI3 kinase inhibitor, which has good activity against follicular lymphoma and is often used successfully in the setting of third or fourth-line therapy to cap the progression of his lymphoma while we go about ascertaining whether he is eligible for another cellular therapy trial here.  If not, this could serve as salvage therapy.  I am recommending this drug because the alternative, which would include Revlimid and Rituxan, or R2, uses Rituxan and this would potentially interfere with being eligible for the CAR T study with the high-affinity anti-CD16  structure.  I will contact Dr. Crooks by telephone to convey this recommendation.  For now, I have scheduled no followup for Mr. Blake pending resolution of whether he is eligible for further studies here.     Gage Corral M.D.

## 2020-10-08 NOTE — NURSING NOTE
"Oncology Rooming Note    October 8, 2020 2:27 PM   Juvenal Blake is a 55 year old male who presents for:    Chief Complaint   Patient presents with     Blood Draw     Labs drawn via PORT by RN in lab. VS taken.     Oncology Clinic Visit     Follicular lymphoma (H     Initial Vitals: /71 (BP Location: Right arm, Patient Position: Sitting, Cuff Size: Adult Regular)   Pulse 65   Temp 97.8  F (36.6  C) (Oral)   Resp 16   Wt 109.3 kg (241 lb)   SpO2 98%   BMI 36.65 kg/m   Estimated body mass index is 36.65 kg/m  as calculated from the following:    Height as of 9/30/20: 1.727 m (5' 7.99\").    Weight as of this encounter: 109.3 kg (241 lb). Body surface area is 2.29 meters squared.  Mild Pain (2) Comment: Data Unavailable   No LMP for male patient.  Allergies reviewed: Yes  Medications reviewed: Yes    Medications: Medication refills not needed today.  Pharmacy name entered into [employer]: [facility] #[id number] - [place], [place] - [address] AT [facility]    Clinical concerns: No new concerns today. Dr. Corral was notified.      Catracho Shields MA            "

## 2020-10-08 NOTE — LETTER
10/8/2020         RE: Juvenal Blake  [address] [place] [address]      HISTORY OF PRESENT ILLNESS:  I saw Mr. Juvenal Blake today, who is currently approximately 30 days status post haplo NK NAM therapy for progressive follicular lymphoma, grade 2A.  He was recently hospitalized for 4-5 days for a fever.  He tested positive for strep in the ER.  Blood cultures remain negative.  He was treated first with cefepime and then eventually switched to Augmentin and discharged.  He recently had a PET/CT scan performed 09/30 that showed clear evidence of progression with new adenopathy in hypermetabolism as well as increase in size of previously noted lesions.  He underwent an excisional biopsy of a palpable right inguinal node, which showed persistent follicular lymphoma but with a significant amount of grade 3 pathology.  There was no evidence of a diffuse large B-cell transformation.  The core was quite large, measuring 1.2 cm x 0.5.  Flow confirmed that there were clonal B cells that were strongly positive for CD20 and also for CD10, BCL6, BCL2.  Ki-67 positive rate was 20%-30%.  A repeat bone marrow biopsy showed no morphologic or immunophenotypic evidence of B-cell lymphoma and was 30%-40% cellular.  Prior to the NAM NK therapy, he had easily detectable involvement with follicular lymphoma.      Since discharge, Juvenal says he has had no night sweats, fevers, chills, nausea, vomiting, diarrhea, cough, hemoptysis, shortness of breath, hematuria, dysuria, bruising or bleeding.  He does have some loose stools.  He is still on Augmentin and will finish in a couple of days.  The remainder of the 10-point review of systems is negative.      PHYSICAL EXAMINATION:   GENERAL:  He looked healthy.   VITAL SIGNS:  Unremarkable.   HEENT:  Sclerae were anicteric and noninjected.   LYMPH:  He had a palpable rather large node in the right inguinal area.   SKIN:  Looked normal.     EYES:  Eyes were non-injected and  there was no discharge.   NEUROLOGIC:  He was alert and oriented.      LABORATORY DATA:  Labs today showed a white count of 5200, hemoglobin 9.7, platelets 208,000.  Electrolyte panel was normal and ALT was slightly elevated at 93, AST at 45, alkaline phosphatase was 125, bilirubin 0.39.      ASSESSMENT AND PLAN:  I spent a total of 40 minutes today face-to-face with Mr. Blake and his wife, ofpanelconference of BMT attendings with a special interest in cellularon Monday to see if he is eligible for other cellular-based therapy.  In particular, I am thinking of a study (2017-31)  which uses a combination of Rituxan with a CAR T transfected with a high-affinity anti-CD16 structure such that the CAR Ts are specifically targeted towards malignant cells.  We also have available Kymriah and Yescarta CAR , although this is typically done for diffuse large B-cell lymphoma. I counseled Mr. Blake and his wife about the rationale, risks and benefits of CAR-T therapy and the possibilities of lethal CRS and neurotoxicity In the interim. I answered appropriate questions to the best of my capacities. I recommend that Dr. Crooks contact the patient and start him on idelalisib, a PI3 kinase inhibitor, which has good activity against follicular lymphoma and is often used successfully in the setting of third or fourth-line therapy to cap the progression of his lymphoma while we go about ascertaining whether he is eligible for another cellular therapy trial here.  If not, this could serve as salvage therapy.  I am recommending this drug because the alternative, which would include Revlimid and Rituxan, or R2, uses Rituxan and this would potentially interfere with being eligible for the CAR T study with the high-affinity anti-CD16 structure.  I will contact Dr. Crooks by telephone to convey this recommendation.  For now, I have scheduled no followup for Mr. Blake pending resolution of whether he is eligible for further studies  here.     Gage Corral M.D.

## 2020-10-08 NOTE — PROGRESS NOTES
HISTORY OF PRESENT ILLNESS:  I saw Mr. Juvenal Blake today, who is currently approximately 30 days status post haplo NK NAM therapy for progressive follicular lymphoma, grade 2A.  He was recently hospitalized for 4-5 days for a fever.  He tested positive for strep in the ER.  Blood cultures remain negative.  He was treated first with cefepime and then eventually switched to Augmentin and discharged.  He recently had a PET/CT scan performed 09/30 that showed clear evidence of progression with new adenopathy in hypermetabolism as well as increase in size of previously noted lesions.  He underwent an excisional biopsy of a palpable right inguinal node, which showed persistent follicular lymphoma but with a significant amount of grade 3 pathology.  There was no evidence of a diffuse large B-cell transformation.  The core was quite large, measuring 1.2 cm x 0.5.  Flow confirmed that there were clonal B cells that were strongly positive for CD20 and also for CD10, BCL6, BCL2.  Ki-67 positive rate was 20%-30%.  A repeat bone marrow biopsy showed no morphologic or immunophenotypic evidence of B-cell lymphoma and was 30%-40% cellular.  Prior to the NAM NK therapy, he had easily detectable involvement with follicular lymphoma.      Since discharge, Juvenal says he has had no night sweats, fevers, chills, nausea, vomiting, diarrhea, cough, hemoptysis, shortness of breath, hematuria, dysuria, bruising or bleeding.  He does have some loose stools.  He is still on Augmentin and will finish in a couple of days.  The remainder of the 10-point review of systems is negative.      PHYSICAL EXAMINATION:   GENERAL:  He looked healthy.   VITAL SIGNS:  Unremarkable.   HEENT:  Sclerae were anicteric and noninjected.   LYMPH:  He had a palpable rather large node in the right inguinal area.   SKIN:  Looked normal.     EYES:  Eyes were non-injected and there was no discharge.   NEUROLOGIC:  He was alert and oriented.      LABORATORY DATA:  Labs  today showed a white count of 5200, hemoglobin 9.7, platelets 208,000.  Electrolyte panel was normal and ALT was slightly elevated at 93, AST at 45, alkaline phosphatase was 125, bilirubin 0.39.      ASSESSMENT AND PLAN:  I spent a total of 40 minutes today face-to-face with Mr. Blake and his wife, ofpanelconference of BMT attendings with a special interest in cellularon Monday to see if he is eligible for other cellular-based therapy.  In particular, I am thinking of a study (2017-31)  which uses a combination of Rituxan with a CAR T transfected with a high-affinity anti-CD16 structure such that the CAR Ts are specifically targeted towards malignant cells.  We also have available Kymriah and Yescarta CAR , although this is typically done for diffuse large B-cell lymphoma. I counseled Mr. Blake and his wife about the rationale, risks and benefits of CAR-T therapy and the possibilities of lethal CRS and neurotoxicity In the interim. I answered appropriate questions to the best of my capacities. I recommend that Dr. Crooks contact the patient and start him on idelalisib, a PI3 kinase inhibitor, which has good activity against follicular lymphoma and is often used successfully in the setting of third or fourth-line therapy to cap the progression of his lymphoma while we go about ascertaining whether he is eligible for another cellular therapy trial here.  If not, this could serve as salvage therapy.  I am recommending this drug because the alternative, which would include Revlimid and Rituxan, or R2, uses Rituxan and this would potentially interfere with being eligible for the CAR T study with the high-affinity anti-CD16 structure.  I will contact Dr. Crooks by telephone to convey this recommendation.  For now, I have scheduled no followup for Mr. Blake pending resolution of whether he is eligible for further studies here.     Gage Corral M.D.

## 2020-10-09 LAB — COPATH REPORT: NORMAL

## 2020-10-12 ENCOUNTER — COMMUNICATION - HEALTHEAST (OUTPATIENT)
Dept: ONCOLOGY | Facility: HOSPITAL | Age: 55
End: 2020-10-12

## 2020-10-12 ENCOUNTER — AMBULATORY - HEALTHEAST (OUTPATIENT)
Dept: ONCOLOGY | Facility: HOSPITAL | Age: 55
End: 2020-10-12

## 2020-10-12 DIAGNOSIS — C82.08 FOLLICULAR LYMPHOMA GRADE I, LYMPH NODES OF MULTIPLE SITES (H): ICD-10-CM

## 2020-10-13 ENCOUNTER — COMMUNICATION - HEALTHEAST (OUTPATIENT)
Dept: ONCOLOGY | Facility: HOSPITAL | Age: 55
End: 2020-10-13

## 2020-10-15 ENCOUNTER — COMMUNICATION - HEALTHEAST (OUTPATIENT)
Dept: ONCOLOGY | Facility: HOSPITAL | Age: 55
End: 2020-10-15

## 2020-10-16 ENCOUNTER — COMMUNICATION - HEALTHEAST (OUTPATIENT)
Dept: ONCOLOGY | Facility: HOSPITAL | Age: 55
End: 2020-10-16

## 2020-10-16 LAB — COPATH REPORT: NORMAL

## 2020-10-17 ENCOUNTER — COMMUNICATION - HEALTHEAST (OUTPATIENT)
Dept: ONCOLOGY | Facility: HOSPITAL | Age: 55
End: 2020-10-17

## 2020-10-19 ENCOUNTER — COMMUNICATION - HEALTHEAST (OUTPATIENT)
Dept: ONCOLOGY | Facility: HOSPITAL | Age: 55
End: 2020-10-19

## 2020-10-19 DIAGNOSIS — C82.08 FOLLICULAR LYMPHOMA GRADE I, LYMPH NODES OF MULTIPLE SITES (H): ICD-10-CM

## 2020-10-20 ENCOUNTER — COMMUNICATION - HEALTHEAST (OUTPATIENT)
Dept: ONCOLOGY | Facility: HOSPITAL | Age: 55
End: 2020-10-20

## 2020-10-20 DIAGNOSIS — R35.0 URINARY FREQUENCY: ICD-10-CM

## 2020-10-21 DIAGNOSIS — C82.08 FOLLICULAR LYMPHOMA GRADE I, LYMPH NODES OF MULTIPLE SITES (H): Primary | ICD-10-CM

## 2020-10-22 ENCOUNTER — OFFICE VISIT (OUTPATIENT)
Dept: TRANSPLANT | Facility: CLINIC | Age: 55
End: 2020-10-22
Attending: INTERNAL MEDICINE
Payer: COMMERCIAL

## 2020-10-22 ENCOUNTER — APPOINTMENT (OUTPATIENT)
Dept: LAB | Facility: CLINIC | Age: 55
End: 2020-10-22
Attending: INTERNAL MEDICINE
Payer: COMMERCIAL

## 2020-10-22 ENCOUNTER — RECORDS - HEALTHEAST (OUTPATIENT)
Dept: ADMINISTRATIVE | Facility: OTHER | Age: 55
End: 2020-10-22

## 2020-10-22 VITALS
OXYGEN SATURATION: 96 % | RESPIRATION RATE: 18 BRPM | WEIGHT: 244.8 LBS | SYSTOLIC BLOOD PRESSURE: 121 MMHG | HEART RATE: 62 BPM | BODY MASS INDEX: 37.23 KG/M2 | DIASTOLIC BLOOD PRESSURE: 76 MMHG | TEMPERATURE: 96.9 F

## 2020-10-22 DIAGNOSIS — C82.08 FOLLICULAR LYMPHOMA GRADE I, LYMPH NODES OF MULTIPLE SITES (H): ICD-10-CM

## 2020-10-22 LAB
ALBUMIN SERPL-MCNC: 3.5 G/DL (ref 3.4–5)
ALP SERPL-CCNC: 106 U/L (ref 40–150)
ALT SERPL W P-5'-P-CCNC: 28 U/L (ref 0–70)
ANION GAP SERPL CALCULATED.3IONS-SCNC: 3 MMOL/L (ref 3–14)
AST SERPL W P-5'-P-CCNC: 23 U/L (ref 0–45)
BASOPHILS # BLD AUTO: 0 10E9/L (ref 0–0.2)
BASOPHILS NFR BLD AUTO: 0.6 %
BILIRUB SERPL-MCNC: 0.3 MG/DL (ref 0.2–1.3)
BUN SERPL-MCNC: 12 MG/DL (ref 7–30)
CALCIUM SERPL-MCNC: 8.8 MG/DL (ref 8.5–10.1)
CHLORIDE SERPL-SCNC: 107 MMOL/L (ref 94–109)
CO2 SERPL-SCNC: 29 MMOL/L (ref 20–32)
CREAT SERPL-MCNC: 1.4 MG/DL (ref 0.66–1.25)
DIFFERENTIAL METHOD BLD: ABNORMAL
EOSINOPHIL # BLD AUTO: 0.1 10E9/L (ref 0–0.7)
EOSINOPHIL NFR BLD AUTO: 2.4 %
ERYTHROCYTE [DISTWIDTH] IN BLOOD BY AUTOMATED COUNT: 15 % (ref 10–15)
GFR SERPL CREATININE-BSD FRML MDRD: 55 ML/MIN/{1.73_M2}
GLUCOSE SERPL-MCNC: 99 MG/DL (ref 70–99)
HCT VFR BLD AUTO: 33.5 % (ref 40–53)
HGB BLD-MCNC: 10.8 G/DL (ref 13.3–17.7)
IMM GRANULOCYTES # BLD: 0 10E9/L (ref 0–0.4)
IMM GRANULOCYTES NFR BLD: 0.6 %
LDH SERPL L TO P-CCNC: 225 U/L (ref 85–227)
LYMPHOCYTES # BLD AUTO: 1.8 10E9/L (ref 0.8–5.3)
LYMPHOCYTES NFR BLD AUTO: 39.4 %
MCH RBC QN AUTO: 30.3 PG (ref 26.5–33)
MCHC RBC AUTO-ENTMCNC: 32.2 G/DL (ref 31.5–36.5)
MCV RBC AUTO: 94 FL (ref 78–100)
MONOCYTES # BLD AUTO: 0.6 10E9/L (ref 0–1.3)
MONOCYTES NFR BLD AUTO: 12.3 %
NEUTROPHILS # BLD AUTO: 2.1 10E9/L (ref 1.6–8.3)
NEUTROPHILS NFR BLD AUTO: 44.7 %
NRBC # BLD AUTO: 0 10*3/UL
NRBC BLD AUTO-RTO: 0 /100
PHOSPHATE SERPL-MCNC: 4 MG/DL (ref 2.5–4.5)
PLATELET # BLD AUTO: 164 10E9/L (ref 150–450)
POTASSIUM SERPL-SCNC: 4.5 MMOL/L (ref 3.4–5.3)
PROT SERPL-MCNC: 6.6 G/DL (ref 6.8–8.8)
RBC # BLD AUTO: 3.56 10E12/L (ref 4.4–5.9)
SODIUM SERPL-SCNC: 138 MMOL/L (ref 133–144)
URATE SERPL-MCNC: 3 MG/DL (ref 3.5–7.2)
WBC # BLD AUTO: 4.7 10E9/L (ref 4–11)

## 2020-10-22 PROCEDURE — G0463 HOSPITAL OUTPT CLINIC VISIT: HCPCS

## 2020-10-22 PROCEDURE — 99215 OFFICE O/P EST HI 40 MIN: CPT | Performed by: INTERNAL MEDICINE

## 2020-10-22 PROCEDURE — 83615 LACTATE (LD) (LDH) ENZYME: CPT | Performed by: INTERNAL MEDICINE

## 2020-10-22 PROCEDURE — 250N000011 HC RX IP 250 OP 636: Performed by: INTERNAL MEDICINE

## 2020-10-22 PROCEDURE — 84100 ASSAY OF PHOSPHORUS: CPT | Performed by: INTERNAL MEDICINE

## 2020-10-22 PROCEDURE — 80053 COMPREHEN METABOLIC PANEL: CPT | Performed by: INTERNAL MEDICINE

## 2020-10-22 PROCEDURE — 84550 ASSAY OF BLOOD/URIC ACID: CPT | Performed by: INTERNAL MEDICINE

## 2020-10-22 PROCEDURE — 36591 DRAW BLOOD OFF VENOUS DEVICE: CPT

## 2020-10-22 PROCEDURE — 85025 COMPLETE CBC W/AUTO DIFF WBC: CPT | Performed by: INTERNAL MEDICINE

## 2020-10-22 RX ORDER — ALLOPURINOL 300 MG/1
300 TABLET ORAL DAILY
Qty: 30 TABLET | Refills: 3 | Status: SHIPPED | OUTPATIENT
Start: 2020-10-22 | End: 2021-05-27

## 2020-10-22 RX ORDER — HEPARIN SODIUM (PORCINE) LOCK FLUSH IV SOLN 100 UNIT/ML 100 UNIT/ML
5 SOLUTION INTRAVENOUS DAILY PRN
Status: DISCONTINUED | OUTPATIENT
Start: 2020-10-22 | End: 2020-10-23 | Stop reason: HOSPADM

## 2020-10-22 RX ADMIN — Medication 5 ML: at 14:54

## 2020-10-22 ASSESSMENT — PAIN SCALES - GENERAL: PAINLEVEL: MODERATE PAIN (4)

## 2020-10-22 NOTE — NURSING NOTE
"Oncology Rooming Note    October 22, 2020 3:14 PM   Juvenal Blake is a 55 year old male who presents for:    Chief Complaint   Patient presents with     Port Draw     Labs drawn via port by RN in lab. VS taken.      RECHECK     Pt here for routine visit with MD and labs. Pt here for New Eval; s/p NK for Follicular Lymphoma.      Initial Vitals: /76   Pulse 62   Temp 96.9  F (36.1  C) (Tympanic)   Resp 18   Wt 111 kg (244 lb 12.8 oz)   SpO2 96%   BMI 37.23 kg/m   Estimated body mass index is 37.23 kg/m  as calculated from the following:    Height as of 9/30/20: 1.727 m (5' 7.99\").    Weight as of this encounter: 111 kg (244 lb 12.8 oz). Body surface area is 2.31 meters squared.  Moderate Pain (4) Comment: Data Unavailable   No LMP for male patient.  Allergies reviewed: Yes  Medications reviewed: Yes    Medications: Medication refills not needed today.  Pharmacy name entered into [employer]: [facility] #[id number] - [place], [place] - [address] AT [facility]    Clinical concerns: Pt having some Pain in his Abd and Back that he attributes to his disease. Rating his Pain a \"4/10\" currently. Pt states he's started a new oral chemotherapy drug as of yesterday.  Dr. Corral was NOT notified.      Claudine Armendariz RN               "

## 2020-10-22 NOTE — PROGRESS NOTES
HISTORY OF PRESENT ILLNESS:  I saw Juvenal Blake today for followup for his follicular lymphoma which progressed after NAM-NK therapy that was noted on a PET scan from approximately 2-1/2 weeks ago.  I had looked into different possibilities of eligibility for further cellular therapy in the interim, because it might be a while before he could start such therapy.  I recommended to Dr. Crooks that he start idelalisib.  He required a prior approval for this and he just started the medication yesterday.  His past oncologic history is well-summarized in my note from 10/08/2020.  Basically, he has had follicular lymphoma for approximately 10 years and has had various chemotherapies, the last being O-CHOP.  As mentioned, he had an experimental therapy with NAM-NK, but clearly had evidence of progression afterwards with increasing size and avidity of several lesions.      REVIEW OF SYSTEMS:  No night sweats, fevers, chills, nausea, vomiting, diarrhea, cough, hemoptysis, shortness of breath, hematuria, dysuria, bruising or bleeding.  He is somewhat fatigued.  He has a little bit of pain in the groin area.  The remainder of the 10-point review of systems is negative.      PHYSICAL EXAMINATION:   GENERAL:  He looked overall healthy.   VITAL SIGNS:  Unremarkable.   LYMPH NODES:  He had a quite large right inguinal lymph node which is approximately 6 cm long and 3 cm wide.  This is noticeably bigger then this same node was when I examined him on 10/08/2020.      LABORATORY DATA:  Today were remarkable for an elevated creatinine of 1.4 with a calculated clearance of 55.  His white count was 4700, hemoglobin 10.8, platelet count 164,000.  LDH was normal at 225.  Liver function tests were normal.      ASSESSMENT AND PLAN:  I spent a total of 40 minutes face-to-face with the patient, of which 30 was in counseling regarding therapy with .  I reviewed with him in detail the 3 constructs that had been transfected into the  pluripotent stem cell derived NK clone; the high affinity anti-CD16 construct, the CD19 CAR coupled to an NK cell specific activating intracellular cassette and interleukin-15.  The preparative regimen is CyFlu-based as was his previous therapy.  He would be admitted for the day of the infusion, but would not receive a subcutaneous injection of interleukin-2 as he did for the previous therapy.  I emphasized that these NK cells have 3 mechanisms for activation as opposed to 1.  This is a phase I study and I explained the nature of phase I studies which are looking for toxicity.  My understanding is that approximately 6 patients have been through the dose escalation of the NK cells without Rituxan and that he would be enrolled in the second dose with Rituxan added now for the complete system.  There have apparently been 1 CR and 1 CO so far amongst the 6 patients.  They had not seen possible toxicities such as CRS or neurotoxicity or tumor lysis syndrome.  Because of the elevated creatinine, I checked with our pharmacologist to see if there needed to be a dose adjustment in the idelalisib and there is not. I urged him to increase his fluid intake.     I prescribed allopurinol 300 mg p.o. daily for him, and I will contact Dr. Crooks tomorrow.  He is apparently scheduled to see Dr. Crooks next week on the 27th.  It would be appropriate to check tumor lysis labs at that point.  He should have a PET scan performed approximately 2-1/2 to 3 weeks after starting idelalisib to see if he is responding.  If he has a dramatic response, we would then consider not enrolling him in this trial, which he will not be eligible to enroll in until early December and instead continue the idelalisib and observe the cellular therapy for failure of idelalisib.  If on the other hand, he has either progression or minimal response, then I think that I would recommend that he enroll in this experimental cellular therapy trial.      Bart Rioschristiano  and his wife asked many appropriate questions, which I answered to the best of my capacities.  I am left with the impression they have a good understanding of the potential risks and benefits of the procedure and would be willing to proceed under the circumstances outlined above.     40 min face to face with patient, 30 min in counseling.    Gage Corral M.D.

## 2020-10-22 NOTE — LETTER
10/22/2020         RE: Juvenal Blake  [address] [place] [address]        Dear Colleague,    Thank you for referring your patient, Juvenal Blake, to the [facility] BLOOD AND MARROW TRANSPLANT PROGRAM [place]. Please see a copy of my visit note below.    HISTORY OF PRESENT ILLNESS:  I saw Juvenal Blake today for followup for his follicular lymphoma which progressed after NAM-NK therapy that was noted on a PET scan from approximately 2-1/2 weeks ago.  I had looked into different possibilities of eligibility for further cellular therapy in the interim, because it might be a while before he could start such therapy.  I recommended to Dr. Crooks that he start idelalisib.  He required a prior approval for this and he just started the medication yesterday.  His past oncologic history is well-summarized in my note from 10/08/2020.  Basically, he has had follicular lymphoma for approximately 10 years and has had various chemotherapies, the last being O-CHOP.  As mentioned, he had an experimental therapy with NAM-NK, but clearly had evidence of progression afterwards with increasing size and avidity of several lesions.      REVIEW OF SYSTEMS:  No night sweats, fevers, chills, nausea, vomiting, diarrhea, cough, hemoptysis, shortness of breath, hematuria, dysuria, bruising or bleeding.  He is somewhat fatigued.  He has a little bit of pain in the groin area.  The remainder of the 10-point review of systems is negative.      PHYSICAL EXAMINATION:   GENERAL:  He looked overall healthy.   VITAL SIGNS:  Unremarkable.   LYMPH NODES:  He had a quite large right inguinal lymph node which is approximately 6 cm long and 3 cm wide.  This is noticeably bigger then this same node was when I examined him on 10/08/2020.      LABORATORY DATA:  Today were remarkable for an elevated creatinine of 1.4 with a calculated clearance of 55.  His white count was 4700, hemoglobin 10.8, platelet count 164,000.  LDH was  normal at 225.  Liver function tests were normal.      ASSESSMENT AND PLAN:  I spent a total of 40 minutes face-to-face with the patient, of which 30 was in counseling regarding therapy with .  I reviewed with him in detail the 3 constructs that had been transfected into the pluripotent stem cell derived NK clone; the high affinity anti-CD16 construct, the CD19 CAR coupled to an NK cell specific activating intracellular cassette and interleukin-15.  The preparative regimen is CyFlu-based as was his previous therapy.  He would be admitted for the day of the infusion, but would not receive a subcutaneous injection of interleukin-2 as he did for the previous therapy.  I emphasized that these NK cells have 3 mechanisms for activation as opposed to 1.  This is a phase I study and I explained the nature of phase I studies which are looking for toxicity.  My understanding is that approximately 6 patients have been through the dose escalation of the NK cells without Rituxan and that he would be enrolled in the second dose with Rituxan added now for the complete system.  There have apparently been 1 CR and 1 AZ so far amongst the 6 patients.  They had not seen possible toxicities such as CRS or neurotoxicity or tumor lysis syndrome.  Because of the elevated creatinine, I checked with our pharmacologist to see if there needed to be a dose adjustment in the idelalisib and there is not. I urged him to increase his fluid intake.     I prescribed allopurinol 300 mg p.o. daily for him, and I will contact Dr. Crooks tomorrow.  He is apparently scheduled to see Dr. Crooks next week on the 27th.  It would be appropriate to check tumor lysis labs at that point.  He should have a PET scan performed approximately 2-1/2 to 3 weeks after starting idelalisib to see if he is responding.  If he has a dramatic response, we would then consider not enrolling him in this trial, which he will not be eligible to enroll in until early December  and instead continue the idelalisib and observe the cellular therapy for failure of idelalisib.  If on the other hand, he has either progression or minimal response, then I think that I would recommend that he enroll in this experimental cellular therapy trial.      Mr. Blake and his wife asked many appropriate questions, which I answered to the best of my capacities.  I am left with the impression they have a good understanding of the potential risks and benefits of the procedure and would be willing to proceed under the circumstances outlined above.     40 min face to face with patient, 30 min in counseling.    Gage Corral M.D.

## 2020-10-22 NOTE — NURSING NOTE
Chief Complaint   Patient presents with     Port Draw     Labs drawn via port by RN in lab. VS taken.      Labs drawn via Port accessed using 20g gripper needle. Line flushed and Heparin locked. Vital signs taken. Checked into next appointment.       Gloria Holbrook RN

## 2020-10-27 ENCOUNTER — OFFICE VISIT - HEALTHEAST (OUTPATIENT)
Dept: ONCOLOGY | Facility: HOSPITAL | Age: 55
End: 2020-10-27

## 2020-10-27 ENCOUNTER — AMBULATORY - HEALTHEAST (OUTPATIENT)
Dept: INFUSION THERAPY | Facility: HOSPITAL | Age: 55
End: 2020-10-27

## 2020-10-27 DIAGNOSIS — D80.1 HYPOGAMMAGLOBULINEMIA (H): ICD-10-CM

## 2020-10-27 DIAGNOSIS — C61 PROSTATE CANCER (H): ICD-10-CM

## 2020-10-27 DIAGNOSIS — C82.08 FOLLICULAR LYMPHOMA GRADE I, LYMPH NODES OF MULTIPLE SITES (H): ICD-10-CM

## 2020-10-27 DIAGNOSIS — J01.01 ACUTE RECURRENT MAXILLARY SINUSITIS: ICD-10-CM

## 2020-10-27 LAB
ALBUMIN SERPL-MCNC: 3.8 G/DL (ref 3.5–5)
ALP SERPL-CCNC: 87 U/L (ref 45–120)
ALT SERPL W P-5'-P-CCNC: 20 U/L (ref 0–45)
ANION GAP SERPL CALCULATED.3IONS-SCNC: 7 MMOL/L (ref 5–18)
AST SERPL W P-5'-P-CCNC: 17 U/L (ref 0–40)
BASOPHILS # BLD AUTO: 0.1 THOU/UL (ref 0–0.2)
BASOPHILS NFR BLD AUTO: 1 % (ref 0–2)
BILIRUB SERPL-MCNC: 0.3 MG/DL (ref 0–1)
BUN SERPL-MCNC: 11 MG/DL (ref 8–22)
CALCIUM SERPL-MCNC: 8.8 MG/DL (ref 8.5–10.5)
CHLORIDE BLD-SCNC: 105 MMOL/L (ref 98–107)
CO2 SERPL-SCNC: 26 MMOL/L (ref 22–31)
COPATH REPORT: NORMAL
CREAT SERPL-MCNC: 1.15 MG/DL (ref 0.7–1.3)
EOSINOPHIL # BLD AUTO: 0.2 THOU/UL (ref 0–0.4)
EOSINOPHIL NFR BLD AUTO: 3 % (ref 0–6)
ERYTHROCYTE [DISTWIDTH] IN BLOOD BY AUTOMATED COUNT: 14.5 % (ref 11–14.5)
GFR SERPL CREATININE-BSD FRML MDRD: >60 ML/MIN/1.73M2
GLUCOSE BLD-MCNC: 103 MG/DL (ref 70–125)
HCT VFR BLD AUTO: 35.6 % (ref 40–54)
HGB BLD-MCNC: 11.6 G/DL (ref 14–18)
IMM GRANULOCYTES # BLD: 0 THOU/UL
IMM GRANULOCYTES NFR BLD: 1 %
LYMPHOCYTES # BLD AUTO: 1.9 THOU/UL (ref 0.8–4.4)
LYMPHOCYTES NFR BLD AUTO: 34 % (ref 20–40)
MCH RBC QN AUTO: 30.1 PG (ref 27–34)
MCHC RBC AUTO-ENTMCNC: 32.6 G/DL (ref 32–36)
MCV RBC AUTO: 92 FL (ref 80–100)
MONOCYTES # BLD AUTO: 0.7 THOU/UL (ref 0–0.9)
MONOCYTES NFR BLD AUTO: 13 % (ref 2–10)
NEUTROPHILS # BLD AUTO: 2.7 THOU/UL (ref 2–7.7)
NEUTROPHILS NFR BLD AUTO: 49 % (ref 50–70)
PHOSPHATE SERPL-MCNC: 2.6 MG/DL (ref 2.5–4.5)
PLATELET # BLD AUTO: 185 THOU/UL (ref 140–440)
PMV BLD AUTO: 8.9 FL (ref 8.5–12.5)
POTASSIUM BLD-SCNC: 4.1 MMOL/L (ref 3.5–5)
PROT SERPL-MCNC: 6.4 G/DL (ref 6–8)
RBC # BLD AUTO: 3.86 MILL/UL (ref 4.4–6.2)
SODIUM SERPL-SCNC: 138 MMOL/L (ref 136–145)
URATE SERPL-MCNC: 2.6 MG/DL (ref 3–8)
WBC: 5.5 THOU/UL (ref 4–11)

## 2020-10-27 ASSESSMENT — MIFFLIN-ST. JEOR: SCORE: 1873.62

## 2020-10-29 ENCOUNTER — COMMUNICATION - HEALTHEAST (OUTPATIENT)
Dept: ONCOLOGY | Facility: CLINIC | Age: 55
End: 2020-10-29

## 2020-10-30 ENCOUNTER — RECORDS - HEALTHEAST (OUTPATIENT)
Dept: RADIOLOGY | Facility: CLINIC | Age: 55
End: 2020-10-30

## 2020-11-04 ENCOUNTER — COMMUNICATION - HEALTHEAST (OUTPATIENT)
Dept: ONCOLOGY | Facility: HOSPITAL | Age: 55
End: 2020-11-04

## 2020-11-06 ENCOUNTER — COMMUNICATION - HEALTHEAST (OUTPATIENT)
Dept: ONCOLOGY | Facility: HOSPITAL | Age: 55
End: 2020-11-06

## 2020-11-09 ENCOUNTER — INFUSION - HEALTHEAST (OUTPATIENT)
Dept: INFUSION THERAPY | Facility: HOSPITAL | Age: 55
End: 2020-11-09

## 2020-11-09 DIAGNOSIS — D80.6 IMMUNODEFICIENCY DISORDER DUE TO ANTIBODY DEFICIENCY (H): ICD-10-CM

## 2020-11-09 DIAGNOSIS — D80.1 HYPOGAMMAGLOBULINEMIA (H): ICD-10-CM

## 2020-11-09 DIAGNOSIS — C61 PROSTATE CANCER (H): ICD-10-CM

## 2020-11-09 DIAGNOSIS — C82.08 FOLLICULAR LYMPHOMA GRADE I, LYMPH NODES OF MULTIPLE SITES (H): ICD-10-CM

## 2020-11-09 LAB
ALBUMIN SERPL-MCNC: 3.8 G/DL (ref 3.5–5)
ALP SERPL-CCNC: 74 U/L (ref 45–120)
ALT SERPL W P-5'-P-CCNC: 29 U/L (ref 0–45)
ANION GAP SERPL CALCULATED.3IONS-SCNC: 10 MMOL/L (ref 5–18)
AST SERPL W P-5'-P-CCNC: 19 U/L (ref 0–40)
BASOPHILS # BLD AUTO: 0 THOU/UL (ref 0–0.2)
BASOPHILS NFR BLD AUTO: 1 % (ref 0–2)
BILIRUB SERPL-MCNC: 0.3 MG/DL (ref 0–1)
BUN SERPL-MCNC: 12 MG/DL (ref 8–22)
CALCIUM SERPL-MCNC: 8.4 MG/DL (ref 8.5–10.5)
CHLORIDE BLD-SCNC: 104 MMOL/L (ref 98–107)
CO2 SERPL-SCNC: 25 MMOL/L (ref 22–31)
CREAT SERPL-MCNC: 1 MG/DL (ref 0.7–1.3)
EOSINOPHIL # BLD AUTO: 0.1 THOU/UL (ref 0–0.4)
EOSINOPHIL NFR BLD AUTO: 2 % (ref 0–6)
ERYTHROCYTE [DISTWIDTH] IN BLOOD BY AUTOMATED COUNT: 13.8 % (ref 11–14.5)
GFR SERPL CREATININE-BSD FRML MDRD: >60 ML/MIN/1.73M2
GLUCOSE BLD-MCNC: 122 MG/DL (ref 70–125)
HCT VFR BLD AUTO: 36.9 % (ref 40–54)
HGB BLD-MCNC: 12 G/DL (ref 14–18)
IMM GRANULOCYTES # BLD: 0.1 THOU/UL
IMM GRANULOCYTES NFR BLD: 2 %
LDH SERPL L TO P-CCNC: 249 U/L (ref 125–220)
LYMPHOCYTES # BLD AUTO: 1.5 THOU/UL (ref 0.8–4.4)
LYMPHOCYTES NFR BLD AUTO: 34 % (ref 20–40)
MCH RBC QN AUTO: 29.3 PG (ref 27–34)
MCHC RBC AUTO-ENTMCNC: 32.5 G/DL (ref 32–36)
MCV RBC AUTO: 90 FL (ref 80–100)
MONOCYTES # BLD AUTO: 0.5 THOU/UL (ref 0–0.9)
MONOCYTES NFR BLD AUTO: 11 % (ref 2–10)
NEUTROPHILS # BLD AUTO: 2.2 THOU/UL (ref 2–7.7)
NEUTROPHILS NFR BLD AUTO: 50 % (ref 50–70)
PHOSPHATE SERPL-MCNC: 2.8 MG/DL (ref 2.5–4.5)
PLATELET # BLD AUTO: 161 THOU/UL (ref 140–440)
PMV BLD AUTO: 9.8 FL (ref 8.5–12.5)
POTASSIUM BLD-SCNC: 4.3 MMOL/L (ref 3.5–5)
PROT SERPL-MCNC: 6.1 G/DL (ref 6–8)
PSA SERPL-MCNC: 0.5 NG/ML (ref 0–3.5)
RBC # BLD AUTO: 4.09 MILL/UL (ref 4.4–6.2)
SODIUM SERPL-SCNC: 139 MMOL/L (ref 136–145)
URATE SERPL-MCNC: 2 MG/DL (ref 3–8)
WBC: 4.4 THOU/UL (ref 4–11)

## 2020-11-11 ENCOUNTER — HOSPITAL ENCOUNTER (OUTPATIENT)
Dept: PET IMAGING | Facility: HOSPITAL | Age: 55
Setting detail: RADIATION/ONCOLOGY SERIES
Discharge: STILL ADMITTED OR ADMINISTRATIVE | End: 2020-11-11

## 2020-11-11 DIAGNOSIS — C82.08 FOLLICULAR LYMPHOMA GRADE I, LYMPH NODES OF MULTIPLE SITES (H): ICD-10-CM

## 2020-11-11 LAB — GLUCOSE BLDC GLUCOMTR-MCNC: 106 MG/DL (ref 70–139)

## 2020-11-13 ENCOUNTER — AMBULATORY - HEALTHEAST (OUTPATIENT)
Dept: INFUSION THERAPY | Facility: HOSPITAL | Age: 55
End: 2020-11-13

## 2020-11-13 ENCOUNTER — OFFICE VISIT - HEALTHEAST (OUTPATIENT)
Dept: ONCOLOGY | Facility: HOSPITAL | Age: 55
End: 2020-11-13

## 2020-11-13 DIAGNOSIS — C82.08 FOLLICULAR LYMPHOMA GRADE I, LYMPH NODES OF MULTIPLE SITES (H): ICD-10-CM

## 2020-11-17 ENCOUNTER — COMMUNICATION - HEALTHEAST (OUTPATIENT)
Dept: ONCOLOGY | Facility: HOSPITAL | Age: 55
End: 2020-11-17

## 2020-11-17 ENCOUNTER — TELEPHONE (OUTPATIENT)
Dept: TRANSPLANT | Facility: CLINIC | Age: 55
End: 2020-11-17

## 2020-11-17 NOTE — TELEPHONE ENCOUNTER
Contacted Juvenal to discuss cancellation of day 100 post nam nk appts as he is responding to idelasib as seen on recent pet scan at [facility]. He verbalized understanding of plan and this was also discussed with [facility] office RN, Estrella. If he has dz progression, he should be referred back to bmt for clinical trial consideration.

## 2020-12-05 ENCOUNTER — COMMUNICATION - HEALTHEAST (OUTPATIENT)
Dept: ONCOLOGY | Facility: HOSPITAL | Age: 55
End: 2020-12-05

## 2020-12-08 ENCOUNTER — COMMUNICATION - HEALTHEAST (OUTPATIENT)
Dept: ONCOLOGY | Facility: HOSPITAL | Age: 55
End: 2020-12-08

## 2020-12-15 ENCOUNTER — COMMUNICATION - HEALTHEAST (OUTPATIENT)
Dept: ONCOLOGY | Facility: HOSPITAL | Age: 55
End: 2020-12-15

## 2020-12-15 DIAGNOSIS — C82.08 FOLLICULAR LYMPHOMA GRADE I, LYMPH NODES OF MULTIPLE SITES (H): ICD-10-CM

## 2020-12-18 ENCOUNTER — COMMUNICATION - HEALTHEAST (OUTPATIENT)
Dept: ONCOLOGY | Facility: HOSPITAL | Age: 55
End: 2020-12-18

## 2020-12-23 ENCOUNTER — OFFICE VISIT - HEALTHEAST (OUTPATIENT)
Dept: ONCOLOGY | Facility: HOSPITAL | Age: 55
End: 2020-12-23

## 2020-12-23 ENCOUNTER — AMBULATORY - HEALTHEAST (OUTPATIENT)
Dept: INFUSION THERAPY | Facility: HOSPITAL | Age: 55
End: 2020-12-23

## 2020-12-23 DIAGNOSIS — C82.08 FOLLICULAR LYMPHOMA GRADE I, LYMPH NODES OF MULTIPLE SITES (H): ICD-10-CM

## 2020-12-23 LAB
ALBUMIN SERPL-MCNC: 3.9 G/DL (ref 3.5–5)
ALP SERPL-CCNC: 68 U/L (ref 45–120)
ALT SERPL W P-5'-P-CCNC: 27 U/L (ref 0–45)
ANION GAP SERPL CALCULATED.3IONS-SCNC: 8 MMOL/L (ref 5–18)
AST SERPL W P-5'-P-CCNC: 15 U/L (ref 0–40)
BASOPHILS # BLD AUTO: 0 THOU/UL (ref 0–0.2)
BASOPHILS NFR BLD AUTO: 1 % (ref 0–2)
BILIRUB SERPL-MCNC: 0.3 MG/DL (ref 0–1)
BUN SERPL-MCNC: 13 MG/DL (ref 8–22)
CALCIUM SERPL-MCNC: 8.7 MG/DL (ref 8.5–10.5)
CHLORIDE BLD-SCNC: 105 MMOL/L (ref 98–107)
CO2 SERPL-SCNC: 27 MMOL/L (ref 22–31)
CREAT SERPL-MCNC: 1.06 MG/DL (ref 0.7–1.3)
EOSINOPHIL # BLD AUTO: 0.1 THOU/UL (ref 0–0.4)
EOSINOPHIL NFR BLD AUTO: 2 % (ref 0–6)
ERYTHROCYTE [DISTWIDTH] IN BLOOD BY AUTOMATED COUNT: 13.8 % (ref 11–14.5)
GFR SERPL CREATININE-BSD FRML MDRD: >60 ML/MIN/1.73M2
GLUCOSE BLD-MCNC: 98 MG/DL (ref 70–125)
HCT VFR BLD AUTO: 42 % (ref 40–54)
HGB BLD-MCNC: 13.3 G/DL (ref 14–18)
IMM GRANULOCYTES # BLD: 0.1 THOU/UL
IMM GRANULOCYTES NFR BLD: 1 %
LYMPHOCYTES # BLD AUTO: 1.6 THOU/UL (ref 0.8–4.4)
LYMPHOCYTES NFR BLD AUTO: 23 % (ref 20–40)
MCH RBC QN AUTO: 27.8 PG (ref 27–34)
MCHC RBC AUTO-ENTMCNC: 31.7 G/DL (ref 32–36)
MCV RBC AUTO: 88 FL (ref 80–100)
MONOCYTES # BLD AUTO: 0.6 THOU/UL (ref 0–0.9)
MONOCYTES NFR BLD AUTO: 9 % (ref 2–10)
NEUTROPHILS # BLD AUTO: 4.4 THOU/UL (ref 2–7.7)
NEUTROPHILS NFR BLD AUTO: 65 % (ref 50–70)
PLATELET # BLD AUTO: 164 THOU/UL (ref 140–440)
PMV BLD AUTO: 9.3 FL (ref 8.5–12.5)
POTASSIUM BLD-SCNC: 4 MMOL/L (ref 3.5–5)
PROT SERPL-MCNC: 6.4 G/DL (ref 6–8)
RBC # BLD AUTO: 4.79 MILL/UL (ref 4.4–6.2)
SODIUM SERPL-SCNC: 140 MMOL/L (ref 136–145)
WBC: 6.9 THOU/UL (ref 4–11)

## 2020-12-23 ASSESSMENT — MIFFLIN-ST. JEOR: SCORE: 1872.26

## 2020-12-28 ENCOUNTER — AMBULATORY - HEALTHEAST (OUTPATIENT)
Dept: ONCOLOGY | Facility: HOSPITAL | Age: 55
End: 2020-12-28

## 2020-12-31 ENCOUNTER — INFUSION - HEALTHEAST (OUTPATIENT)
Dept: INFUSION THERAPY | Facility: HOSPITAL | Age: 55
End: 2020-12-31

## 2020-12-31 DIAGNOSIS — Z98.890 HISTORY OF VASCULAR ACCESS DEVICE: ICD-10-CM

## 2020-12-31 DIAGNOSIS — D80.1 HYPOGAMMAGLOBULINEMIA (H): ICD-10-CM

## 2020-12-31 DIAGNOSIS — C61 PROSTATE CANCER (H): ICD-10-CM

## 2020-12-31 DIAGNOSIS — D80.6 IMMUNODEFICIENCY DISORDER DUE TO ANTIBODY DEFICIENCY (H): ICD-10-CM

## 2021-01-04 ENCOUNTER — HEALTH MAINTENANCE LETTER (OUTPATIENT)
Age: 56
End: 2021-01-04

## 2021-01-06 ENCOUNTER — COMMUNICATION - HEALTHEAST (OUTPATIENT)
Dept: ONCOLOGY | Facility: HOSPITAL | Age: 56
End: 2021-01-06

## 2021-01-19 ENCOUNTER — HOSPITAL ENCOUNTER (OUTPATIENT)
Dept: PET IMAGING | Facility: HOSPITAL | Age: 56
Setting detail: RADIATION/ONCOLOGY SERIES
Discharge: STILL ADMITTED OR ADMINISTRATIVE | End: 2021-01-19

## 2021-01-19 DIAGNOSIS — C82.08 FOLLICULAR LYMPHOMA GRADE I, LYMPH NODES OF MULTIPLE SITES (H): ICD-10-CM

## 2021-01-19 LAB — GLUCOSE BLDC GLUCOMTR-MCNC: 93 MG/DL (ref 70–139)

## 2021-01-20 ENCOUNTER — AMBULATORY - HEALTHEAST (OUTPATIENT)
Dept: INFUSION THERAPY | Facility: HOSPITAL | Age: 56
End: 2021-01-20

## 2021-01-20 ENCOUNTER — OFFICE VISIT - HEALTHEAST (OUTPATIENT)
Dept: ONCOLOGY | Facility: HOSPITAL | Age: 56
End: 2021-01-20

## 2021-01-20 DIAGNOSIS — C82.08 FOLLICULAR LYMPHOMA GRADE I, LYMPH NODES OF MULTIPLE SITES (H): ICD-10-CM

## 2021-01-20 DIAGNOSIS — C61 PROSTATE CANCER (H): ICD-10-CM

## 2021-01-20 LAB
ALBUMIN SERPL-MCNC: 3.6 G/DL (ref 3.5–5)
ALP SERPL-CCNC: 57 U/L (ref 45–120)
ALT SERPL W P-5'-P-CCNC: 25 U/L (ref 0–45)
ANION GAP SERPL CALCULATED.3IONS-SCNC: 7 MMOL/L (ref 5–18)
AST SERPL W P-5'-P-CCNC: 16 U/L (ref 0–40)
BASOPHILS # BLD AUTO: 0 THOU/UL (ref 0–0.2)
BASOPHILS NFR BLD AUTO: 0 % (ref 0–2)
BILIRUB SERPL-MCNC: 0.5 MG/DL (ref 0–1)
BUN SERPL-MCNC: 20 MG/DL (ref 8–22)
CALCIUM SERPL-MCNC: 8.9 MG/DL (ref 8.5–10.5)
CHLORIDE BLD-SCNC: 106 MMOL/L (ref 98–107)
CO2 SERPL-SCNC: 28 MMOL/L (ref 22–31)
CREAT SERPL-MCNC: 1 MG/DL (ref 0.7–1.3)
EOSINOPHIL # BLD AUTO: 0.1 THOU/UL (ref 0–0.4)
EOSINOPHIL NFR BLD AUTO: 1 % (ref 0–6)
ERYTHROCYTE [DISTWIDTH] IN BLOOD BY AUTOMATED COUNT: 15.5 % (ref 11–14.5)
GFR SERPL CREATININE-BSD FRML MDRD: >60 ML/MIN/1.73M2
GLUCOSE BLD-MCNC: 101 MG/DL (ref 70–125)
HCT VFR BLD AUTO: 40.1 % (ref 40–54)
HGB BLD-MCNC: 12.9 G/DL (ref 14–18)
IMM GRANULOCYTES # BLD: 0.2 THOU/UL
IMM GRANULOCYTES NFR BLD: 3 %
LYMPHOCYTES # BLD AUTO: 1 THOU/UL (ref 0.8–4.4)
LYMPHOCYTES NFR BLD AUTO: 17 % (ref 20–40)
MCH RBC QN AUTO: 28.3 PG (ref 27–34)
MCHC RBC AUTO-ENTMCNC: 32.2 G/DL (ref 32–36)
MCV RBC AUTO: 88 FL (ref 80–100)
MONOCYTES # BLD AUTO: 0.5 THOU/UL (ref 0–0.9)
MONOCYTES NFR BLD AUTO: 8 % (ref 2–10)
NEUTROPHILS # BLD AUTO: 4.2 THOU/UL (ref 2–7.7)
NEUTROPHILS NFR BLD AUTO: 70 % (ref 50–70)
PLATELET # BLD AUTO: 111 THOU/UL (ref 140–440)
PMV BLD AUTO: 9.8 FL (ref 8.5–12.5)
POTASSIUM BLD-SCNC: 4.1 MMOL/L (ref 3.5–5)
PROT SERPL-MCNC: 6.4 G/DL (ref 6–8)
PSA SERPL-MCNC: 0.8 NG/ML (ref 0–3.5)
RBC # BLD AUTO: 4.56 MILL/UL (ref 4.4–6.2)
SODIUM SERPL-SCNC: 141 MMOL/L (ref 136–145)
WBC: 6.1 THOU/UL (ref 4–11)

## 2021-01-22 ENCOUNTER — COMMUNICATION - HEALTHEAST (OUTPATIENT)
Dept: ONCOLOGY | Facility: HOSPITAL | Age: 56
End: 2021-01-22

## 2021-01-28 ENCOUNTER — INFUSION - HEALTHEAST (OUTPATIENT)
Dept: INFUSION THERAPY | Facility: HOSPITAL | Age: 56
End: 2021-01-28

## 2021-01-28 ENCOUNTER — COMMUNICATION - HEALTHEAST (OUTPATIENT)
Dept: SCHEDULING | Facility: CLINIC | Age: 56
End: 2021-01-28

## 2021-01-28 DIAGNOSIS — D80.6 IMMUNODEFICIENCY DISORDER DUE TO ANTIBODY DEFICIENCY (H): ICD-10-CM

## 2021-01-28 DIAGNOSIS — D80.1 HYPOGAMMAGLOBULINEMIA (H): ICD-10-CM

## 2021-01-28 LAB
ALBUMIN SERPL-MCNC: 3.8 G/DL (ref 3.5–5)
ALP SERPL-CCNC: 55 U/L (ref 45–120)
ALT SERPL W P-5'-P-CCNC: 21 U/L (ref 0–45)
ANION GAP SERPL CALCULATED.3IONS-SCNC: 7 MMOL/L (ref 5–18)
AST SERPL W P-5'-P-CCNC: 15 U/L (ref 0–40)
BILIRUB SERPL-MCNC: 0.4 MG/DL (ref 0–1)
BUN SERPL-MCNC: 19 MG/DL (ref 8–22)
CALCIUM SERPL-MCNC: 8.8 MG/DL (ref 8.5–10.5)
CHLORIDE BLD-SCNC: 106 MMOL/L (ref 98–107)
CO2 SERPL-SCNC: 28 MMOL/L (ref 22–31)
CREAT SERPL-MCNC: 1.09 MG/DL (ref 0.7–1.3)
GFR SERPL CREATININE-BSD FRML MDRD: >60 ML/MIN/1.73M2
GLUCOSE BLD-MCNC: 99 MG/DL (ref 70–125)
MAGNESIUM SERPL-MCNC: 1.9 MG/DL (ref 1.8–2.6)
POTASSIUM BLD-SCNC: 4 MMOL/L (ref 3.5–5)
PROT SERPL-MCNC: 6.4 G/DL (ref 6–8)
SARS-COV-2 PCR COMMENT: NORMAL
SARS-COV-2 RNA SPEC QL NAA+PROBE: NEGATIVE
SARS-COV-2 VIRUS SPECIMEN SOURCE: NORMAL
SODIUM SERPL-SCNC: 141 MMOL/L (ref 136–145)

## 2021-02-01 ENCOUNTER — COMMUNICATION - HEALTHEAST (OUTPATIENT)
Dept: ONCOLOGY | Facility: HOSPITAL | Age: 56
End: 2021-02-01

## 2021-02-01 DIAGNOSIS — C82.08 FOLLICULAR LYMPHOMA GRADE I, LYMPH NODES OF MULTIPLE SITES (H): ICD-10-CM

## 2021-02-02 ENCOUNTER — COMMUNICATION - HEALTHEAST (OUTPATIENT)
Dept: ONCOLOGY | Facility: HOSPITAL | Age: 56
End: 2021-02-02

## 2021-02-02 DIAGNOSIS — C82.08 FOLLICULAR LYMPHOMA GRADE I, LYMPH NODES OF MULTIPLE SITES (H): ICD-10-CM

## 2021-02-05 ENCOUNTER — COMMUNICATION - HEALTHEAST (OUTPATIENT)
Dept: ONCOLOGY | Facility: HOSPITAL | Age: 56
End: 2021-02-05

## 2021-02-08 ENCOUNTER — COMMUNICATION - HEALTHEAST (OUTPATIENT)
Dept: ONCOLOGY | Facility: HOSPITAL | Age: 56
End: 2021-02-08

## 2021-02-17 ENCOUNTER — COMMUNICATION - HEALTHEAST (OUTPATIENT)
Dept: ONCOLOGY | Facility: HOSPITAL | Age: 56
End: 2021-02-17

## 2021-02-22 ENCOUNTER — COMMUNICATION - HEALTHEAST (OUTPATIENT)
Dept: ONCOLOGY | Facility: HOSPITAL | Age: 56
End: 2021-02-22

## 2021-02-23 ENCOUNTER — COMMUNICATION - HEALTHEAST (OUTPATIENT)
Dept: ONCOLOGY | Facility: HOSPITAL | Age: 56
End: 2021-02-23

## 2021-02-23 DIAGNOSIS — C82.08 FOLLICULAR LYMPHOMA GRADE I, LYMPH NODES OF MULTIPLE SITES (H): ICD-10-CM

## 2021-02-25 ENCOUNTER — INFUSION - HEALTHEAST (OUTPATIENT)
Dept: INFUSION THERAPY | Facility: HOSPITAL | Age: 56
End: 2021-02-25

## 2021-02-25 DIAGNOSIS — D80.1 HYPOGAMMAGLOBULINEMIA (H): ICD-10-CM

## 2021-02-25 DIAGNOSIS — D80.6 IMMUNODEFICIENCY DISORDER DUE TO ANTIBODY DEFICIENCY (H): ICD-10-CM

## 2021-03-16 ENCOUNTER — COMMUNICATION - HEALTHEAST (OUTPATIENT)
Dept: ONCOLOGY | Facility: HOSPITAL | Age: 56
End: 2021-03-16

## 2021-03-17 ENCOUNTER — COMMUNICATION - HEALTHEAST (OUTPATIENT)
Dept: ONCOLOGY | Facility: HOSPITAL | Age: 56
End: 2021-03-17

## 2021-03-25 ENCOUNTER — AMBULATORY - HEALTHEAST (OUTPATIENT)
Dept: ONCOLOGY | Facility: HOSPITAL | Age: 56
End: 2021-03-25

## 2021-03-25 ENCOUNTER — OFFICE VISIT - HEALTHEAST (OUTPATIENT)
Dept: ONCOLOGY | Facility: HOSPITAL | Age: 56
End: 2021-03-25

## 2021-03-25 ENCOUNTER — INFUSION - HEALTHEAST (OUTPATIENT)
Dept: INFUSION THERAPY | Facility: HOSPITAL | Age: 56
End: 2021-03-25

## 2021-03-25 DIAGNOSIS — C82.08 FOLLICULAR LYMPHOMA GRADE I, LYMPH NODES OF MULTIPLE SITES (H): ICD-10-CM

## 2021-03-25 DIAGNOSIS — C61 PROSTATE CANCER (H): ICD-10-CM

## 2021-03-25 DIAGNOSIS — D80.6 IMMUNODEFICIENCY DISORDER DUE TO ANTIBODY DEFICIENCY (H): ICD-10-CM

## 2021-03-25 DIAGNOSIS — R51.9 ACUTE NONINTRACTABLE HEADACHE, UNSPECIFIED HEADACHE TYPE: ICD-10-CM

## 2021-03-25 DIAGNOSIS — D80.1 HYPOGAMMAGLOBULINEMIA (H): ICD-10-CM

## 2021-03-25 LAB
ALBUMIN SERPL-MCNC: 3.6 G/DL (ref 3.5–5)
ALP SERPL-CCNC: 92 U/L (ref 45–120)
ALT SERPL W P-5'-P-CCNC: 32 U/L (ref 0–45)
ANION GAP SERPL CALCULATED.3IONS-SCNC: 12 MMOL/L (ref 5–18)
AST SERPL W P-5'-P-CCNC: 22 U/L (ref 0–40)
BASOPHILS # BLD AUTO: 0 THOU/UL (ref 0–0.2)
BASOPHILS NFR BLD AUTO: 1 % (ref 0–2)
BILIRUB SERPL-MCNC: 0.3 MG/DL (ref 0–1)
BUN SERPL-MCNC: 14 MG/DL (ref 8–22)
CALCIUM SERPL-MCNC: 8.8 MG/DL (ref 8.5–10.5)
CHLORIDE BLD-SCNC: 105 MMOL/L (ref 98–107)
CO2 SERPL-SCNC: 23 MMOL/L (ref 22–31)
CREAT SERPL-MCNC: 1.09 MG/DL (ref 0.7–1.3)
EOSINOPHIL # BLD AUTO: 0.3 THOU/UL (ref 0–0.4)
EOSINOPHIL NFR BLD AUTO: 4 % (ref 0–6)
ERYTHROCYTE [DISTWIDTH] IN BLOOD BY AUTOMATED COUNT: 16.2 % (ref 11–14.5)
GFR SERPL CREATININE-BSD FRML MDRD: >60 ML/MIN/1.73M2
GLUCOSE BLD-MCNC: 171 MG/DL (ref 70–125)
HCT VFR BLD AUTO: 35.9 % (ref 40–54)
HGB BLD-MCNC: 11.7 G/DL (ref 14–18)
IMM GRANULOCYTES # BLD: 0.1 THOU/UL
IMM GRANULOCYTES NFR BLD: 1 %
LYMPHOCYTES # BLD AUTO: 1.1 THOU/UL (ref 0.8–4.4)
LYMPHOCYTES NFR BLD AUTO: 15 % (ref 20–40)
MCH RBC QN AUTO: 29.8 PG (ref 27–34)
MCHC RBC AUTO-ENTMCNC: 32.6 G/DL (ref 32–36)
MCV RBC AUTO: 92 FL (ref 80–100)
MONOCYTES # BLD AUTO: 0.7 THOU/UL (ref 0–0.9)
MONOCYTES NFR BLD AUTO: 8 % (ref 2–10)
NEUTROPHILS # BLD AUTO: 5.6 THOU/UL (ref 2–7.7)
NEUTROPHILS NFR BLD AUTO: 72 % (ref 50–70)
PLATELET # BLD AUTO: 133 THOU/UL (ref 140–440)
PMV BLD AUTO: 9.5 FL (ref 8.5–12.5)
POTASSIUM BLD-SCNC: 3.6 MMOL/L (ref 3.5–5)
PROT SERPL-MCNC: 6.4 G/DL (ref 6–8)
PSA SERPL-MCNC: 0.8 NG/ML (ref 0–3.5)
RBC # BLD AUTO: 3.92 MILL/UL (ref 4.4–6.2)
SODIUM SERPL-SCNC: 140 MMOL/L (ref 136–145)
WBC: 7.7 THOU/UL (ref 4–11)

## 2021-03-30 ENCOUNTER — HOSPITAL ENCOUNTER (OUTPATIENT)
Dept: MRI IMAGING | Facility: HOSPITAL | Age: 56
Setting detail: RADIATION/ONCOLOGY SERIES
Discharge: STILL ADMITTED OR ADMINISTRATIVE | End: 2021-03-30

## 2021-03-30 DIAGNOSIS — R51.9 ACUTE NONINTRACTABLE HEADACHE, UNSPECIFIED HEADACHE TYPE: ICD-10-CM

## 2021-03-30 DIAGNOSIS — C82.08 FOLLICULAR LYMPHOMA GRADE I, LYMPH NODES OF MULTIPLE SITES (H): ICD-10-CM

## 2021-04-01 ENCOUNTER — COMMUNICATION - HEALTHEAST (OUTPATIENT)
Dept: ONCOLOGY | Facility: HOSPITAL | Age: 56
End: 2021-04-01

## 2021-04-20 ENCOUNTER — AMBULATORY - HEALTHEAST (OUTPATIENT)
Dept: ONCOLOGY | Facility: HOSPITAL | Age: 56
End: 2021-04-20

## 2021-04-20 ENCOUNTER — COMMUNICATION - HEALTHEAST (OUTPATIENT)
Dept: ONCOLOGY | Facility: HOSPITAL | Age: 56
End: 2021-04-20

## 2021-04-22 ENCOUNTER — INFUSION - HEALTHEAST (OUTPATIENT)
Dept: INFUSION THERAPY | Facility: HOSPITAL | Age: 56
End: 2021-04-22

## 2021-04-22 DIAGNOSIS — D80.6 IMMUNODEFICIENCY DISORDER DUE TO ANTIBODY DEFICIENCY (H): ICD-10-CM

## 2021-04-22 DIAGNOSIS — D80.1 HYPOGAMMAGLOBULINEMIA (H): ICD-10-CM

## 2021-04-26 ENCOUNTER — COMMUNICATION - HEALTHEAST (OUTPATIENT)
Dept: ONCOLOGY | Facility: HOSPITAL | Age: 56
End: 2021-04-26

## 2021-04-27 ENCOUNTER — AMBULATORY - HEALTHEAST (OUTPATIENT)
Dept: ONCOLOGY | Facility: HOSPITAL | Age: 56
End: 2021-04-27

## 2021-04-27 DIAGNOSIS — C82.08 FOLLICULAR LYMPHOMA GRADE I, LYMPH NODES OF MULTIPLE SITES (H): ICD-10-CM

## 2021-04-27 DIAGNOSIS — R35.0 URINARY FREQUENCY: ICD-10-CM

## 2021-04-28 ENCOUNTER — TELEPHONE (OUTPATIENT)
Dept: ONCOLOGY | Facility: CLINIC | Age: 56
End: 2021-04-28

## 2021-04-28 NOTE — TELEPHONE ENCOUNTER
Estrada Alejandro CPhT  [facility]  Oncology Pharmacy Liaison   [email or web address]  [phone] (phone  [phone] (fax

## 2021-04-29 NOTE — TELEPHONE ENCOUNTER
Prior Authorization Approval    Authorization Effective Date:    Authorization Expiration Date:    Medication: Zydelig 150mg PA Approved  Approved Dose/Quantity: 60/30  Reference #: [id number]   Insurance Company: [employer] - Phone [phone] Fax [phone]  Expected CoPay:       CoPay Card Available:      Foundation Assistance Needed:    Which Pharmacy is filling the prescription (Not needed for infusion/clinic administered): [facility] [address] 5-[address]  Pharmacy Notified:    Patient Notified:      Estrada Alejandro CPhT  [facility]  Oncology Pharmacy [email or web address]  [phone] (phone  [phone] (fax

## 2021-05-05 ENCOUNTER — COMMUNICATION - HEALTHEAST (OUTPATIENT)
Dept: ONCOLOGY | Facility: HOSPITAL | Age: 56
End: 2021-05-05

## 2021-05-05 DIAGNOSIS — R35.0 URINARY FREQUENCY: ICD-10-CM

## 2021-05-14 ENCOUNTER — COMMUNICATION - HEALTHEAST (OUTPATIENT)
Dept: ONCOLOGY | Facility: HOSPITAL | Age: 56
End: 2021-05-14

## 2021-05-17 ENCOUNTER — HOSPITAL ENCOUNTER (OUTPATIENT)
Dept: PET IMAGING | Facility: HOSPITAL | Age: 56
Setting detail: RADIATION/ONCOLOGY SERIES
Discharge: STILL ADMITTED OR ADMINISTRATIVE | End: 2021-05-17
Payer: COMMERCIAL

## 2021-05-17 DIAGNOSIS — C82.08 FOLLICULAR LYMPHOMA GRADE I, LYMPH NODES OF MULTIPLE SITES (H): ICD-10-CM

## 2021-05-17 LAB — GLUCOSE BLDC GLUCOMTR-MCNC: 102 MG/DL (ref 70–139)

## 2021-05-17 ASSESSMENT — MIFFLIN-ST. JEOR: SCORE: 1901.74

## 2021-05-18 ENCOUNTER — TELEPHONE (OUTPATIENT)
Dept: TRANSPLANT | Facility: CLINIC | Age: 56
End: 2021-05-18

## 2021-05-18 DIAGNOSIS — C82.08 FOLLICULAR LYMPHOMA GRADE I, LYMPH NODES OF MULTIPLE SITES (H): Primary | ICD-10-CM

## 2021-05-19 ENCOUNTER — MEDICAL CORRESPONDENCE (OUTPATIENT)
Dept: TRANSPLANT | Facility: CLINIC | Age: 56
End: 2021-05-19

## 2021-05-21 ENCOUNTER — AMBULATORY - HEALTHEAST (OUTPATIENT)
Dept: INFUSION THERAPY | Facility: HOSPITAL | Age: 56
End: 2021-05-21

## 2021-05-21 ENCOUNTER — HOSPITAL ENCOUNTER (OUTPATIENT)
Facility: CLINIC | Age: 56
End: 2021-05-21
Payer: COMMERCIAL

## 2021-05-21 ENCOUNTER — AMBULATORY - HEALTHEAST (OUTPATIENT)
Dept: ONCOLOGY | Facility: HOSPITAL | Age: 56
End: 2021-05-21

## 2021-05-21 ENCOUNTER — OFFICE VISIT - HEALTHEAST (OUTPATIENT)
Dept: ONCOLOGY | Facility: HOSPITAL | Age: 56
End: 2021-05-21

## 2021-05-21 DIAGNOSIS — Z11.59 ENCOUNTER FOR SCREENING FOR OTHER VIRAL DISEASES: ICD-10-CM

## 2021-05-21 DIAGNOSIS — C61 MALIGNANT NEOPLASM OF PROSTATE (H): ICD-10-CM

## 2021-05-21 DIAGNOSIS — C82.08 FOLLICULAR LYMPHOMA GRADE I, LYMPH NODES OF MULTIPLE SITES (H): ICD-10-CM

## 2021-05-21 LAB
ALBUMIN SERPL-MCNC: 3.9 G/DL (ref 3.5–5)
ALP SERPL-CCNC: 88 U/L (ref 45–120)
ALT SERPL W P-5'-P-CCNC: 27 U/L (ref 0–45)
ANION GAP SERPL CALCULATED.3IONS-SCNC: 10 MMOL/L (ref 5–18)
AST SERPL W P-5'-P-CCNC: 26 U/L (ref 0–40)
BASOPHILS # BLD AUTO: 0 THOU/UL (ref 0–0.2)
BASOPHILS NFR BLD AUTO: 0 % (ref 0–2)
BILIRUB SERPL-MCNC: 0.4 MG/DL (ref 0–1)
BUN SERPL-MCNC: 16 MG/DL (ref 8–22)
CALCIUM SERPL-MCNC: 9.1 MG/DL (ref 8.5–10.5)
CHLORIDE BLD-SCNC: 104 MMOL/L (ref 98–107)
CO2 SERPL-SCNC: 28 MMOL/L (ref 22–31)
CREAT SERPL-MCNC: 1.12 MG/DL (ref 0.7–1.3)
EOSINOPHIL COUNT (ABSOLUTE): 1.2 THOU/UL (ref 0–0.4)
EOSINOPHIL NFR BLD AUTO: 8 % (ref 0–6)
ERYTHROCYTE [DISTWIDTH] IN BLOOD BY AUTOMATED COUNT: 14.8 % (ref 11–14.5)
GFR SERPL CREATININE-BSD FRML MDRD: >60 ML/MIN/1.73M2
GLUCOSE BLD-MCNC: 123 MG/DL (ref 70–125)
HCT VFR BLD AUTO: 38 % (ref 40–54)
HGB BLD-MCNC: 12.2 G/DL (ref 14–18)
IMMATURE GRANULOCYTE % - MAN (DIFF): 1 %
IMMATURE GRANULOCYTE ABSOLUTE - MAN (DIFF): 0.2 THOU/UL
LYMPHOCYTES # BLD AUTO: 8.3 THOU/UL (ref 0.8–4.4)
LYMPHOCYTES NFR BLD AUTO: 55 % (ref 20–40)
MCH RBC QN AUTO: 29.3 PG (ref 27–34)
MCHC RBC AUTO-ENTMCNC: 32.1 G/DL (ref 32–36)
MCV RBC AUTO: 91 FL (ref 80–100)
MONOCYTES # BLD AUTO: 1.2 THOU/UL (ref 0–0.9)
MONOCYTES NFR BLD AUTO: 8 % (ref 2–10)
MYELOCYTES (ABSOLUTE): 0.2 THOU/UL
MYELOCYTES NFR BLD MANUAL: 1 %
PLAT MORPH BLD: ABNORMAL
PLATELET # BLD AUTO: 93 THOU/UL (ref 140–440)
PMV BLD AUTO: 9.4 FL (ref 8.5–12.5)
POTASSIUM BLD-SCNC: 4.1 MMOL/L (ref 3.5–5)
PROT SERPL-MCNC: 6.9 G/DL (ref 6–8)
PSA SERPL-MCNC: 0.7 NG/ML (ref 0–3.5)
RBC # BLD AUTO: 4.16 MILL/UL (ref 4.4–6.2)
SODIUM SERPL-SCNC: 142 MMOL/L (ref 136–145)
TOTAL NEUTROPHILS-ABS(DIFF): 4.2 THOU/UL (ref 2–7.7)
TOTAL NEUTROPHILS-REL(DIFF): 28 % (ref 50–70)
WBC: 15.1 THOU/UL (ref 4–11)

## 2021-05-21 NOTE — PROGRESS NOTES
"    Outpatient IR Biopsy Referral    Patient is a 55 year old male with history of follicular lymphoma s/p chemotherapy and immunotherapy. PET CT 5/17/21 to monitor treatment response showed concern for progression of disease:  Increasing metabolic activity of pre-existing lymph nodes and development of a hypermetabolic lymph nodes above and below the diaphragm with involvement of the splenic parenchyma suspicious for progression of disease. R>L inguinal node (max SUV 7.1, previously 3.4). Request for IR review for lymph node biopsy.    Case and imaging was reviewed with Dr Martinez. Ultrasound guided right inguinal lymph node biopsy recommended.         Procedure order, leukemia lymphoma, and surgical pathology order placed.    If requesting team would like sample sent for anything else please use the IR order set \"IR RAD Biopsy or Fluid Aspirate Specimens\" to select your necessary diagnostic labs and pend for admission. If there are labs you desire that are not found in this order set or you have questions regarding specific diagnostic labs please call the associated lab personnel. IR will not enter diagnostic labs on the day of the procedure.     Primary team Dr Terry made aware of IR recommendations. Epic messages and page sent.    Patient will be schedule for procedure. Message sent to schedulers.    Rolanda Prabhakar PA-C  Interventional Radiology   IR on-call pager: [phone]      "

## 2021-05-24 RX ORDER — LIDOCAINE 40 MG/G
CREAM TOPICAL
Status: CANCELLED | OUTPATIENT
Start: 2021-05-24

## 2021-05-24 RX ORDER — SODIUM CHLORIDE 9 MG/ML
INJECTION, SOLUTION INTRAVENOUS CONTINUOUS
Status: CANCELLED | OUTPATIENT
Start: 2021-05-24

## 2021-05-25 ENCOUNTER — COMMUNICATION - HEALTHEAST (OUTPATIENT)
Dept: ONCOLOGY | Facility: HOSPITAL | Age: 56
End: 2021-05-25

## 2021-05-26 ENCOUNTER — AMBULATORY - HEALTHEAST (OUTPATIENT)
Dept: FAMILY MEDICINE | Facility: CLINIC | Age: 56
End: 2021-05-26

## 2021-05-26 ENCOUNTER — MYC MEDICAL ADVICE (OUTPATIENT)
Dept: TRANSPLANT | Facility: CLINIC | Age: 56
End: 2021-05-26

## 2021-05-26 ENCOUNTER — AMBULATORY - HEALTHEAST (OUTPATIENT)
Dept: LAB | Facility: CLINIC | Age: 56
End: 2021-05-26

## 2021-05-26 DIAGNOSIS — Z11.59 ENCOUNTER FOR SCREENING FOR OTHER VIRAL DISEASES: ICD-10-CM

## 2021-05-26 DIAGNOSIS — Z20.822 ENCOUNTER FOR LABORATORY TESTING FOR COVID-19 VIRUS: Primary | ICD-10-CM

## 2021-05-26 LAB
SARS-COV-2 PCR COMMENT: NORMAL
SARS-COV-2 RNA SPEC QL NAA+PROBE: NEGATIVE
SARS-COV-2 VIRUS SPECIMEN SOURCE: NORMAL

## 2021-05-26 NOTE — TELEPHONE ENCOUNTER
Contacted Juvenal to discuss new appt schedule. Explained that Dr Terry wanted patient consult and bx moved up to this week. Explained that covid test is needed today for LN bx on 5/28. He will check vm for message left by Covid test  and get his test done today. He was provided with BMT office phone number to call if he has further questions. He verbalized understanding of plan.

## 2021-05-27 ENCOUNTER — OFFICE VISIT (OUTPATIENT)
Dept: TRANSPLANT | Facility: CLINIC | Age: 56
End: 2021-05-27
Attending: INTERNAL MEDICINE
Payer: COMMERCIAL

## 2021-05-27 ENCOUNTER — COMMUNICATION - HEALTHEAST (OUTPATIENT)
Dept: SCHEDULING | Facility: CLINIC | Age: 56
End: 2021-05-27

## 2021-05-27 VITALS
TEMPERATURE: 98.1 F | OXYGEN SATURATION: 98 % | HEART RATE: 72 BPM | SYSTOLIC BLOOD PRESSURE: 153 MMHG | DIASTOLIC BLOOD PRESSURE: 78 MMHG

## 2021-05-27 VITALS
BODY MASS INDEX: 38.13 KG/M2 | SYSTOLIC BLOOD PRESSURE: 129 MMHG | HEART RATE: 70 BPM | RESPIRATION RATE: 16 BRPM | OXYGEN SATURATION: 98 % | WEIGHT: 251.6 LBS | TEMPERATURE: 97.5 F | DIASTOLIC BLOOD PRESSURE: 68 MMHG | HEIGHT: 68 IN

## 2021-05-27 VITALS — HEIGHT: 68 IN | WEIGHT: 243 LBS | BODY MASS INDEX: 36.83 KG/M2

## 2021-05-27 DIAGNOSIS — E66.01 MORBID OBESITY (H): ICD-10-CM

## 2021-05-27 DIAGNOSIS — C82.08 FOLLICULAR LYMPHOMA GRADE I, LYMPH NODES OF MULTIPLE SITES (H): Primary | ICD-10-CM

## 2021-05-27 LAB
ALBUMIN SERPL-MCNC: 3.4 G/DL (ref 3.4–5)
ALP SERPL-CCNC: 76 U/L (ref 40–150)
ALT SERPL W P-5'-P-CCNC: 33 U/L (ref 0–70)
ANION GAP SERPL CALCULATED.3IONS-SCNC: 6 MMOL/L (ref 3–14)
ANISOCYTOSIS BLD QL SMEAR: SLIGHT
AST SERPL W P-5'-P-CCNC: 18 U/L (ref 0–45)
BASOPHILS # BLD AUTO: 0.2 10E9/L (ref 0–0.2)
BASOPHILS NFR BLD AUTO: 1.7 %
BILIRUB SERPL-MCNC: 0.4 MG/DL (ref 0.2–1.3)
BUN SERPL-MCNC: 14 MG/DL (ref 7–30)
CALCIUM SERPL-MCNC: 8.6 MG/DL (ref 8.5–10.1)
CHLORIDE SERPL-SCNC: 104 MMOL/L (ref 94–109)
CO2 SERPL-SCNC: 29 MMOL/L (ref 20–32)
CREAT SERPL-MCNC: 1.06 MG/DL (ref 0.66–1.25)
DIFFERENTIAL METHOD BLD: ABNORMAL
EOSINOPHIL # BLD AUTO: 0.4 10E9/L (ref 0–0.7)
EOSINOPHIL NFR BLD AUTO: 3.5 %
ERYTHROCYTE [DISTWIDTH] IN BLOOD BY AUTOMATED COUNT: 15.2 % (ref 10–15)
GFR SERPL CREATININE-BSD FRML MDRD: 77 ML/MIN/{1.73_M2}
GLUCOSE SERPL-MCNC: 99 MG/DL (ref 70–99)
HCT VFR BLD AUTO: 38.4 % (ref 40–53)
HGB BLD-MCNC: 12.5 G/DL (ref 13.3–17.7)
LDH SERPL L TO P-CCNC: 208 U/L (ref 85–227)
LYMPHOCYTES # BLD AUTO: 5.7 10E9/L (ref 0.8–5.3)
LYMPHOCYTES NFR BLD AUTO: 53.9 %
MCH RBC QN AUTO: 29.7 PG (ref 26.5–33)
MCHC RBC AUTO-ENTMCNC: 32.6 G/DL (ref 31.5–36.5)
MCV RBC AUTO: 91 FL (ref 78–100)
MONOCYTES # BLD AUTO: 0.3 10E9/L (ref 0–1.3)
MONOCYTES NFR BLD AUTO: 2.6 %
NEUTROPHILS # BLD AUTO: 3.9 10E9/L (ref 1.6–8.3)
NEUTROPHILS NFR BLD AUTO: 37.4 %
PLATELET # BLD AUTO: 108 10E9/L (ref 150–450)
PLATELET # BLD EST: ABNORMAL 10*3/UL
POTASSIUM SERPL-SCNC: 3.9 MMOL/L (ref 3.4–5.3)
PROMYELOCYTES # BLD MANUAL: 0.1 10E9/L
PROMYELOCYTES NFR BLD MANUAL: 0.9 %
PROT SERPL-MCNC: 6.7 G/DL (ref 6.8–8.8)
RBC # BLD AUTO: 4.21 10E12/L (ref 4.4–5.9)
SODIUM SERPL-SCNC: 139 MMOL/L (ref 133–144)
URATE SERPL-MCNC: 4.5 MG/DL (ref 3.5–7.2)
WBC # BLD AUTO: 10.5 10E9/L (ref 4–11)

## 2021-05-27 PROCEDURE — G0463 HOSPITAL OUTPT CLINIC VISIT: HCPCS

## 2021-05-27 PROCEDURE — 84550 ASSAY OF BLOOD/URIC ACID: CPT

## 2021-05-27 PROCEDURE — 83615 LACTATE (LD) (LDH) ENZYME: CPT

## 2021-05-27 PROCEDURE — 99215 OFFICE O/P EST HI 40 MIN: CPT

## 2021-05-27 PROCEDURE — 80053 COMPREHEN METABOLIC PANEL: CPT

## 2021-05-27 PROCEDURE — 85025 COMPLETE CBC W/AUTO DIFF WBC: CPT

## 2021-05-27 RX ORDER — ALLOPURINOL 300 MG/1
300 TABLET ORAL DAILY
Qty: 30 TABLET | Refills: 3 | Status: SHIPPED | OUTPATIENT
Start: 2021-05-27 | End: 2021-07-29

## 2021-05-27 RX ORDER — AZELASTINE 1 MG/ML
SPRAY, METERED NASAL
COMMUNITY
Start: 2020-12-17 | End: 2021-08-17

## 2021-05-27 RX ORDER — FLUTICASONE PROPIONATE 50 MCG
SPRAY, SUSPENSION (ML) NASAL
COMMUNITY
Start: 2020-12-16 | End: 2021-08-17

## 2021-05-27 RX ORDER — TOLTERODINE 4 MG/1
4 CAPSULE, EXTENDED RELEASE ORAL
COMMUNITY
Start: 2021-05-05 | End: 2021-08-17

## 2021-05-27 RX ORDER — IDELALISIB 150 MG/1
TABLET, FILM COATED ORAL
COMMUNITY
Start: 2021-04-29 | End: 2021-06-04

## 2021-05-27 ASSESSMENT — MIFFLIN-ST. JEOR: SCORE: 1940.59

## 2021-05-27 ASSESSMENT — PAIN SCALES - GENERAL: PAINLEVEL: MILD PAIN (3)

## 2021-05-27 NOTE — LETTER
5/27/2021         RE: Juvenal Blake  [address] [place] [address]        Dear Colleague,    Thank you for referring your patient, Juvenal Blake, to the [facility] BLOOD AND MARROW TRANSPLANT PROGRAM [place]. Please see a copy of my visit note below.    Cell therapy new patient clinic note.  05/27/21       I had the pleasure to see Mr. Blake in BMT clinic to discuss treatment options for relapsed lymphoma and his candidacy for cellular therapies.     Juvenal is 55 years old patient well-known to us.  He has follicular lymphoma initially diagnosed in 2010 and now has presented with fourth recurrence.  His last therapy was Idelalisib since October 2020 and he presented to , his oncologist last week with increased abdominal distention and feeling unwell.      Dr. Crooks obtained PET/CT on 5/17 which demonstrated dramatic progression of the lymphadenopathy involving multiple areas; the largest lymph nodes are in mesentery and retroperitoneal sites measuring more then 5 cm in diameter. SUV is around 10, there is also rapidly growing right inguinal and upper femoral lymph node.    Today patient reports feeling better after he was treated with 5 days course of prednisone.  The lymph node seems to be softer, he denies night sweats and has gained weight on it. also having more energy.  He is eating but complains of a frequent and small bowel movements. Belly hurts at times but no constipation. The patient has no night sweats.      He is scheduled for the right inguinal lymph node biopsy tomorrow.    Details of extensive oncological history is summarized below:     1. Follicular lymphoma: Diagnosed in April, 2010 . Stage IV diagnosis with bone marrow involvement. Initial disease involved the cervical, supraclavicular, axillary, mesenteric mass, retroperitoneal nodes, and possibly the manubrium.  Relapse noted on imaging in December 2016. Axillary node biopsy from February 2017 shows  low-grade follicle center cell lymphoma.  Second relapse on imaging January 2019. Repeat biopsy January 7, 2019 of a right medial thigh lymph node shows recurrent follicular lymphoma. Grade 2.  Third relapse on imaging in May, 2020. Pathologic confirmation May 28, 2020 from a right axillary lymph node excisional biopsy.  Fourth relapse May 2021    2. Prostate cancer: Pathologic stage T3b prostate cancer. Positive margins, multiple, and surgery. 3 lymph nodes were negative. High risk for recurrent disease. He is being followed at [facility]. Rombauer 4+3 = 7. Tertiary Maricel pattern 5 and preoperative PSA of 27.     3. Hypogammaglobulinemia: He has received intermittent doses of IVIG.    Treatment:    Lymphoma:   He had 6 cycles of bendamustine and Rituxan completed in October, 2010. He had 2 years of maintenance Rituxan therapy finishing in September, 2012.    Second course of bendamustine plus rituximab started February 20, 2017. Cycle 6 was completed on July 11, 2017. Then had maintenance rituximab through January, 2019.    O-CHOP started at second relapse. Cycle 1 given April 23, 2019. Cycle 6 given August 16, 2019.  Haplo NAM-NK therapy at [facility] September 1, 2020    Iidelalisib started October 21, 2020 at [facility]. 150 mg twice daily.     Prostate:   Initial radical prostatectomy and bilateral lymph node dissection. November 15, 2017. Prostatic adenocarcinoma Rombauer 4+3 = 7 with tertiary pattern 5. Tumor involves 45% of total surface area. Positive for extensive extraprostatic extension. Positive for bilateral seminal vesicle invasion. Multiple margins positive. Lymphovascular invasion not identified. Perineural invasion was noted. Lymph nodes negative. 3 were examined (2 right obturator and 1 left obturator).    Completed radiation to the prostate fossa and pelvic lymph nodes at [facility] early May 2018. He received 4500 cGy in 25 fractions. He then had 2340 cGy boost in 13 fractions to  "the prostatic fossa, for a total dose of 6240 cGy in 38 treatment fractions delivered over 54 days. He did not receive hormonal therapy.    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  He lives with his wife they live in [facility] there are no care giver issues.     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 PSA checked regulary     Sleep apnea   CPAP     Physical Exam:  /68 (BP Location: Right arm, Patient Position: Sitting, Cuff Size: Adult Large)   Pulse 70   Temp 97.5  F (36.4  C) (Tympanic)   Resp 16   Ht 1.727 m (5' 7.99\")   Wt 114.1 kg (251 lb 9.6 oz)   SpO2 98%   BMI 38.27 kg/m  . ECOG 1    General: patient appears stated age of 55 y.o.. Nontoxic and in no distress. Some abd pain.   HEENT: Head: atraumatic, normocephalic. Sclerae anicteric.  Chest: Normal respiratory effort.   Cardiac: No edema.   Abdomen: abdomen is non-distended  Extremities: normal tone and muscle bulk.  Skin: no lesions or rash. Warm and dry.   CNS: alert and oriented. Grossly non-focal.   Psychiatric: normal mood and affect.   Nodes: No palpable cervical or supraclavicular nodes.    Lab Results:  Lab Results   Component Value Date    WBC 10.5 05/27/2021    ANEU 3.9 05/27/2021    HGB 12.5 (L) 05/27/2021    HCT 38.4 (L) 05/27/2021     (L) 05/27/2021     05/27/2021    POTASSIUM 3.9 05/27/2021    CHLORIDE 104 05/27/2021    CO2 29 05/27/2021    GLC 99 05/27/2021    BUN 14 05/27/2021    CR 1.06 05/27/2021    MAG 2.1 10/05/2020    INR 1.04 09/01/2020    AST 18 05/27/2021    ALT 33 05/27/2021     Imaging:  Result Date: 5/17/2021  EXAM: NM PET " CT : Increasing size and metabolic activity of bilateral retroperitoneal (max SUV 10.5) lymph nodes with development of left supraclavicular (max SUV 3.0), bilateral retropectoral (max SUV 6.4), upper pretracheal station 2 (max SUV 6.0), bilateral lower paratracheal station 4R (max SUV 6.0), subcarinal station 7 (max SUV 5.9), right hilar station 10R (max SUV 5.5), left greater than right paraspinal (max SUV 5.6), retrocrural (max SUV 9.0), portacaval/peripancreatic (max SUV 7.0), mesenteric (Max SUV 7.1), common iliac (max SUV 8.3), left greater than right external iliac (max SUV 4.3), and left inguinal (max SUV 5.4) lymph nodes with diffuse FDG uptake throughout the splenic parenchyma (max SUV 4.5), which is greater than liver background (max SUV 4.1) suspicious for lymphomatous involvement progression of disease. Mild carotid artery bifurcation calcification. Right chest port with tip terminating near the superior cavoatrial junction. Mild diffuse hepatic steatosis. Right renal cyst. Prostatectomy. Pelvic phleboliths. Multilevel degenerative changes of spine.     Increasing metabolic activity of pre-existing lymph nodes and development of a hypermetabolic lymph nodes above and below the diaphragm with involvement of the splenic parenchyma suspicious for progression of disease.    Assessment and plan:    In summary  is 55 years old patient with a multiply relapsed follicular lymphoma; the most recent duration of response was 7 months (Idelalisib).     He is now symptomatic with progressing disease involving multiple sites mostly abdominal adenopathy. LDH and uric acid are stable but he is developing thrombocytopenia.    He has exhausted his standard of care options for his disease.  We discussed the next treatment course which could include chemotherapy followed by the allogeneic stem transplantation or number of cell therapy strategies targeting CD20 or CD19.    For both follicular or transformed  lymphoma we have currently available clinical trials using  IPS derived NK cells which is used in combination for B-cell lymphoma.  The therapy is combined with lymphodepleting chemotherapy with fludarabine and Cytoxan and has been very well-tolerated without substantial toxicities.  The agent is investigational use in a phase 1 study I reviewed the study with the patient including weekly injection infusions x3 followed by the IL-2.  The patient understands that the risks are still unknown although so far in over a dozen of patients we did not observe any CRS or neurotoxicity.    We reviewed the risks and benefits.  My recommendation is to proceed with a clinical trial at this time.  The patient asked many good questions and he voluntarily signed the consent.  He was given a copy of his consent.  He agreed to contraceptive methods.    In the second part of my discussion with Juvenal we reviewed the possible treatment with a commercially available autologous Carticel therapy Yescarta.  This therapy is associated with a at least 60% risk of CRS and neurotoxicity and his disease burden is very high which puts him possibly in a higher risk of these complications.  I think that Yescarta is a good option for him but would prefer to be used for salvage after debulking with .    The morbidity and mortality of allograft is very high and I do not recommend this modality.    We arrange the inguinal biopsy tomorrow including research sampling with InnaVirVax.    We checked his biochemistry including LDH and uric acid today I advised him to start allopurinol 300 mg once a day.  Otherwise there are no immediate concerns, he seems eligible and will proceed with screening test next week with the hope to start the LD chemotherapy as soon as 5/8.    Juvenal is given a sample of the calendar.  I look forward to help him through his treatment.    Rosa Terry MD  Professor of Medicine  [phone]              Again, thank you  for allowing me to participate in the care of your patient.        Sincerely,        BMT DOM

## 2021-05-27 NOTE — PROGRESS NOTES
Cell therapy new patient clinic note.  05/27/21       I had the pleasure to see Mr. Blake in BMT clinic to discuss treatment options for relapsed lymphoma and his candidacy for cellular therapies.     Juvenal is 55 years old patient well-known to us.  He has follicular lymphoma initially diagnosed in 2010 and now has presented with fourth recurrence.  His last therapy was Idelalisib since October 2020 and he presented to , his oncologist last week with increased abdominal distention and feeling unwell.      Dr. Crooks obtained PET/CT on 5/17 which demonstrated dramatic progression of the lymphadenopathy involving multiple areas; the largest lymph nodes are in mesentery and retroperitoneal sites measuring more then 5 cm in diameter. SUV is around 10, there is also rapidly growing right inguinal and upper femoral lymph node.    Today patient reports feeling better after he was treated with 5 days course of prednisone.  The lymph node seems to be softer, he denies night sweats and has gained weight on it. also having more energy.  He is eating but complains of a frequent and small bowel movements. Belly hurts at times but no constipation. The patient has no night sweats.      He is scheduled for the right inguinal lymph node biopsy tomorrow.    Details of extensive oncological history is summarized below:     1. Follicular lymphoma: Diagnosed in April, 2010 . Stage IV diagnosis with bone marrow involvement. Initial disease involved the cervical, supraclavicular, axillary, mesenteric mass, retroperitoneal nodes, and possibly the manubrium.  Relapse noted on imaging in December 2016. Axillary node biopsy from February 2017 shows low-grade follicle center cell lymphoma.  Second relapse on imaging January 2019. Repeat biopsy January 7, 2019 of a right medial thigh lymph node shows recurrent follicular lymphoma. Grade 2.  Third relapse on imaging in May, 2020. Pathologic confirmation May 28, 2020 from a right  axillary lymph node excisional biopsy.  Fourth relapse May 2021    2. Prostate cancer: Pathologic stage T3b prostate cancer. Positive margins, multiple, and surgery. 3 lymph nodes were negative. High risk for recurrent disease. He is being followed at [facility]. Maricel 4+3 = 7. Tertiary Maricel pattern 5 and preoperative PSA of 27.     3. Hypogammaglobulinemia: He has received intermittent doses of IVIG.    Treatment:    Lymphoma:   He had 6 cycles of bendamustine and Rituxan completed in October, 2010. He had 2 years of maintenance Rituxan therapy finishing in September, 2012.    Second course of bendamustine plus rituximab started February 20, 2017. Cycle 6 was completed on July 11, 2017. Then had maintenance rituximab through January, 2019.    O-CHOP started at second relapse. Cycle 1 given April 23, 2019. Cycle 6 given August 16, 2019.  [facility]    Iidelalisib started October 21, 2020 at [facility]. 150 mg twice daily.     Prostate:   Initial radical prostatectomy and bilateral lymph node dissection. November 15, 2017. Prostatic adenocarcinoma Carney 4+3 = 7 with tertiary pattern 5. Tumor involves 45% of total surface area. Positive for extensive extraprostatic extension. Positive for bilateral seminal vesicle invasion. Multiple margins positive. Lymphovascular invasion not identified. Perineural invasion was noted. Lymph nodes negative. 3 were examined (2 right obturator and 1 left obturator).    Completed radiation to the prostate fossa and pelvic lymph nodes at [facility] early May 2018. He received 4500 cGy in 25 fractions. He then had 2340 cGy boost in 13 fractions to the prostatic fossa, for a total dose of 6240 cGy in 38 treatment fractions delivered over 54 days. He did not receive hormonal therapy.    Social history   He is retired for last 5 years has worked in [employer] industry.  He lives with his wife they live in [facility] there are no  "care giver issues.     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 PSA checked regulary     Sleep apnea   CPAP     Physical Exam:  /68 (BP Location: Right arm, Patient Position: Sitting, Cuff Size: Adult Large)   Pulse 70   Temp 97.5  F (36.4  C) (Tympanic)   Resp 16   Ht 1.727 m (5' 7.99\")   Wt 114.1 kg (251 lb 9.6 oz)   SpO2 98%   BMI 38.27 kg/m  . ECOG 1    General: patient appears stated age of 55 y.o.. Nontoxic and in no distress. Some abd pain.   HEENT: Head: atraumatic, normocephalic. Sclerae anicteric.  Chest: Normal respiratory effort.   Cardiac: No edema.   Abdomen: abdomen is non-distended  Extremities: normal tone and muscle bulk.  Skin: no lesions or rash. Warm and dry.   CNS: alert and oriented. Grossly non-focal.   Psychiatric: normal mood and affect.   Nodes: No palpable cervical or supraclavicular nodes.    Lab Results:  Lab Results   Component Value Date    WBC 10.5 05/27/2021    ANEU 3.9 05/27/2021    HGB 12.5 (L) 05/27/2021    HCT 38.4 (L) 05/27/2021     (L) 05/27/2021     05/27/2021    POTASSIUM 3.9 05/27/2021    CHLORIDE 104 05/27/2021    CO2 29 05/27/2021    GLC 99 05/27/2021    BUN 14 05/27/2021    CR 1.06 05/27/2021    MAG 2.1 10/05/2020    INR 1.04 09/01/2020    AST 18 05/27/2021    ALT 33 05/27/2021     Imaging:  Result Date: 5/17/2021  EXAM: NM PET CT : Increasing size and metabolic activity of bilateral retroperitoneal (max SUV 10.5) lymph nodes with development of left supraclavicular (max SUV 3.0), bilateral retropectoral (max SUV 6.4), upper pretracheal station 2 (max SUV 6.0), bilateral lower paratracheal station 4R (max " SUV 6.0), subcarinal station 7 (max SUV 5.9), right hilar station 10R (max SUV 5.5), left greater than right paraspinal (max SUV 5.6), retrocrural (max SUV 9.0), portacaval/peripancreatic (max SUV 7.0), mesenteric (Max SUV 7.1), common iliac (max SUV 8.3), left greater than right external iliac (max SUV 4.3), and left inguinal (max SUV 5.4) lymph nodes with diffuse FDG uptake throughout the splenic parenchyma (max SUV 4.5), which is greater than liver background (max SUV 4.1) suspicious for lymphomatous involvement progression of disease. Mild carotid artery bifurcation calcification. Right chest port with tip terminating near the superior cavoatrial junction. Mild diffuse hepatic steatosis. Right renal cyst. Prostatectomy. Pelvic phleboliths. Multilevel degenerative changes of spine.     Increasing metabolic activity of pre-existing lymph nodes and development of a hypermetabolic lymph nodes above and below the diaphragm with involvement of the splenic parenchyma suspicious for progression of disease.    Assessment and plan:    In summary  is 55 years old patient with a multiply relapsed follicular lymphoma; the most recent duration of response was 7 months (Idelalisib).     He is now symptomatic with progressing disease involving multiple sites mostly abdominal adenopathy. LDH and uric acid are stable but he is developing thrombocytopenia.    He has exhausted his standard of care options for his disease.  We discussed the next treatment course which could include chemotherapy followed by the allogeneic stem transplantation or number of cell therapy strategies targeting CD20 or CD19.    For both follicular or transformed lymphoma we have currently available clinical trials using  IPS derived NK cells which is used in combination for B-cell lymphoma.  The therapy is combined with lymphodepleting chemotherapy with fludarabine and Cytoxan and has been very well-tolerated without substantial toxicities.   The agent is investigational use in a phase 1 study I reviewed the study with the patient including weekly injection infusions x3 followed by the IL-2.  The patient understands that the risks are still unknown although so far in over a dozen of patients we did not observe any CRS or neurotoxicity.    We reviewed the risks and benefits.  My recommendation is to proceed with a clinical trial at this time.  The patient asked many good questions and he voluntarily signed the consent.  He was given a copy of his consent.  He agreed to contraceptive methods.    In the second part of my discussion with Juvenal we reviewed the possible treatment with a commercially available autologous Carticel therapy Yescarta.  This therapy is associated with a at least 60% risk of CRS and neurotoxicity and his disease burden is very high which puts him possibly in a higher risk of these complications.  I think that Yescarta is a good option for him but would prefer to be used for salvage after debulking with .    The morbidity and mortality of allograft is very high and I do not recommend this modality.    We arrange the inguinal biopsy tomorrow including research sampling with Bionet.    We checked his biochemistry including LDH and uric acid today I advised him to start allopurinol 300 mg once a day.  Otherwise there are no immediate concerns, he seems eligible and will proceed with screening test next week with the hope to start the LD chemotherapy as soon as 5/8.    Juvenal is given a sample of the calendar.  I look forward to help him through his treatment.    Rosa Terry MD  Professor of Medicine  [phone]

## 2021-05-27 NOTE — PROGRESS NOTES
[place] paperwork re-faxed to Pt spouse employer [employer] @ [phone] per pt wife request.  Copy of paperwork to records for scanning.

## 2021-05-27 NOTE — PROGRESS NOTES
[place] paperwork for Juvenal faxed tp [facility] [employer] fax [phone].  Copy sent to scanning.

## 2021-05-27 NOTE — NURSING NOTE
"Oncology Rooming Note    May 27, 2021 4:48 PM   Juvenal Blake is a 55 year old male who presents for:    Chief Complaint   Patient presents with     Consult     New pt- eval- Follicular lymphoma grade i, lymph nodes of multiple sites     Initial Vitals: /68 (BP Location: Right arm, Patient Position: Sitting, Cuff Size: Adult Large)   Pulse 70   Temp 97.5  F (36.4  C) (Tympanic)   Resp 16   Ht 1.727 m (5' 7.99\")   Wt 114.1 kg (251 lb 9.6 oz)   SpO2 98%   BMI 38.27 kg/m   Estimated body mass index is 38.27 kg/m  as calculated from the following:    Height as of this encounter: 1.727 m (5' 7.99\").    Weight as of this encounter: 114.1 kg (251 lb 9.6 oz). Body surface area is 2.34 meters squared.  Mild Pain (3) Comment: Data Unavailable   No LMP for male patient.  Allergies reviewed: Yes  Medications reviewed: Yes    Medications: Medication refills not needed today.  Pharmacy name entered into [employer]: [id number] - [place], [place] - [address] AT [facility]    Clinical concerns: N/A        Shannen Hannon CMA              "

## 2021-05-28 ENCOUNTER — HOSPITAL ENCOUNTER (OUTPATIENT)
Facility: CLINIC | Age: 56
Discharge: HOME | End: 2021-05-28
Attending: INTERNAL MEDICINE
Payer: COMMERCIAL

## 2021-05-28 ENCOUNTER — HOSPITAL ENCOUNTER (OUTPATIENT)
Dept: INTERVENTIONAL RADIOLOGY/VASCULAR | Facility: CLINIC | Age: 56
End: 2021-05-28
Payer: COMMERCIAL

## 2021-05-28 ENCOUNTER — ALLIED HEALTH/NURSE VISIT (OUTPATIENT)
Dept: TRANSPLANT | Facility: CLINIC | Age: 56
End: 2021-05-28
Attending: INTERNAL MEDICINE
Payer: COMMERCIAL

## 2021-05-28 VITALS
HEART RATE: 71 BPM | DIASTOLIC BLOOD PRESSURE: 82 MMHG | SYSTOLIC BLOOD PRESSURE: 127 MMHG | OXYGEN SATURATION: 98 % | TEMPERATURE: 98.3 F

## 2021-05-28 DIAGNOSIS — C82.08 FOLLICULAR LYMPHOMA GRADE I, LYMPH NODES OF MULTIPLE SITES (H): Primary | ICD-10-CM

## 2021-05-28 DIAGNOSIS — C82.08 FOLLICULAR LYMPHOMA GRADE I, LYMPH NODES OF MULTIPLE SITES (H): ICD-10-CM

## 2021-05-28 DIAGNOSIS — Z71.9 VISIT FOR COUNSELING: Primary | ICD-10-CM

## 2021-05-28 PROCEDURE — 88184 FLOWCYTOMETRY/ TC 1 MARKER: CPT

## 2021-05-28 PROCEDURE — 999N001137 HC STATISTIC FLOW >15 ABY TC 88189

## 2021-05-28 PROCEDURE — 88185 FLOWCYTOMETRY/TC ADD-ON: CPT

## 2021-05-28 PROCEDURE — 88342 IMHCHEM/IMCYTCHM 1ST ANTB: CPT | Mod: TC

## 2021-05-28 PROCEDURE — 76942 ECHO GUIDE FOR BIOPSY: CPT | Mod: 26 | Performed by: PHYSICIAN ASSISTANT

## 2021-05-28 PROCEDURE — 38505 NEEDLE BIOPSY LYMPH NODES: CPT

## 2021-05-28 PROCEDURE — 88342 IMHCHEM/IMCYTCHM 1ST ANTB: CPT | Mod: 26 | Performed by: PATHOLOGY

## 2021-05-28 PROCEDURE — 88305 TISSUE EXAM BY PATHOLOGIST: CPT | Mod: TC

## 2021-05-28 PROCEDURE — 250N000009 HC RX 250: Performed by: PHYSICIAN ASSISTANT

## 2021-05-28 PROCEDURE — 88189 FLOWCYTOMETRY/READ 16 & >: CPT | Mod: XE | Performed by: STUDENT IN AN ORGANIZED HEALTH CARE EDUCATION/TRAINING PROGRAM

## 2021-05-28 PROCEDURE — 88305 TISSUE EXAM BY PATHOLOGIST: CPT | Mod: 26 | Performed by: PATHOLOGY

## 2021-05-28 PROCEDURE — 88341 IMHCHEM/IMCYTCHM EA ADD ANTB: CPT | Mod: 26 | Performed by: PATHOLOGY

## 2021-05-28 PROCEDURE — 38505 NEEDLE BIOPSY LYMPH NODES: CPT | Mod: RT | Performed by: PHYSICIAN ASSISTANT

## 2021-05-28 PROCEDURE — 88341 IMHCHEM/IMCYTCHM EA ADD ANTB: CPT | Mod: TC

## 2021-05-28 PROCEDURE — 272N000505 HC NEEDLE CR5

## 2021-05-28 RX ADMIN — LIDOCAINE HYDROCHLORIDE 5 ML: 10 INJECTION, SOLUTION EPIDURAL; INFILTRATION; INTRACAUDAL; PERINEURAL at 09:10

## 2021-05-28 NOTE — IP AVS SNAPSHOT
After Visit Summary Template Not Found    This Print Group is only intended to be used in the After Visit Summary and can only be used in a report that uses a released After Visit Summary Template.                       MRN:[id number]                      After Visit Summary   5/28/2021    Mr. Juvenal Blake    MRN: [id number]           Visit Information        Provider Department      5/28/2021  9:00 AM [facility] IR RAD; [facility] Interventional Radiology           Review of your medicines      Notice    This visit is during an admission. Changes to the med list made in this visit will be reflected in the After Visit Summary of the admission.           Protect others around you: Learn how to safely use, store and throw away your medicines at www.disposemymeds.org.       Follow-ups after your visit       Your next 10 appointments already scheduled    May 28, 2021  2:00 PM  (Arrive by 1:45 PM)  Nurse Coordinator Telephone Visit with  Bmt Nurse Coordinator  [facility] ) [address] [place] [id number] [phone]   Please Note: This is a virtual visit; there is no need to come to the facility.       May 28, 2021  2:30 PM  (Arrive by 2:15 PM)  Social Work Video Visit with CITLALLI Cabral  [facility] [place] ([facility] ) [address] [place] [id number] [phone]   Please Note: This is a virtual visit; there is no need to come to the facility.          Care Instructions       Further instructions from your care team         Invasive Radiology Procedures Discharge Instructions         _____________________________________________________________________    Patient Name:  Juvenal Blake  Today's Date:  May 28, 2021    The doctor who did your procedure today was Beto Mujica  MAL.    Procedure:  Biopsy of  Right inguinal lymph node    If you have not received your results after 5 days, please call the doctor who ordered your test.  Diet and medicines    Go back to your normal diet.    You may start taking your normal medicines again (including Coumadin, or warfarin), as shown on your medicine sheet.    If you take aspirin, Plavix or other anti-platelet drugs: Start taking it tomorrow.    If take Coumadin (warfarin): Ask your doctor when to have your INR checked.    For minor pain, you may take Tylenol (acetaminophen) or Advil (ibuprofen).    Activity and puncture site     You may go back to normal activity in 24 hours. Wait 48 hours before lifting,   straining, exercise or other strenuous activity.    For the next day or two, check your puncture site often while you are awake.    Change the Band-Aid or bandage tomorrow.    You may shower tomorrow morning. No bathing or swimming until your   puncture site has fully healed.    If you received IV medicine to sedate you:  You were given: No Medication  You may feel drowsy, forgetful or unsteady. For the next 24 hours, do not drive, drink alcohol or make any important decisions.    Know when to call for help  Call your doctor if you have:    A fever greater over 101 F (38.3 C), taken under the tongue.    A lot of bleeding or swelling at your puncture site.    Pain that is getting worse.     Shortness of breath.  Call 911 or go the emergency room if you have:    Severe chest pain or trouble breathing.    A tube that falls out.     Increased blood in your sputum (phlegm).    Bleeding that you cannot control.   Important phone numbers:  [facility] at  [phone]    Additional Information About Your Visit       [facility] Information    [facility] gives you secure access to your electronic health record. If you see a primary care provider, you can also send messages to your care team and make  appointments. If you have questions, please call your primary care clinic.  If you do not have a primary care provider, please call [phone] and they will assist you.       Care EveryWhere ID    This is your Care EveryWhere ID. This could be used by other organizations to access your [facility] medical records  [id number]       Your Vitals Were  Most recent update: 5/28/2021  9:32 AM    Blood Pressure   127/82 (BP Location: Right arm)    Pulse   71    Temperature   98.3  F (36.8  C) (Oral)    Pulse Oximetry   98%           Primary Care Provider Office Phone # Fax #    Cole Sandoval [phone]      Equal Access to Services    TINA VASQUEZ : Hadii martin zimmerman Sodaren, waaxda lutherqadaha, qaybta kaalmada claudiayada, daksha delvalle. So [facility] [phone].    ATENCIÓN: Si habla español, tiene a allen disposición servicios gratuitos de asistencia lingüística. Llame al [phone].    We comply with applicable federal and state civil rights laws, including the [place] Human Rights Act. We do not discriminate on the basis of race, color, creed, Moravian, national origin, marital status, age, disability, sex, sexual orientation, or gender identity.    If you would like an itemization of your charges they will now be available in [employer] ([employer]) 30 days after discharge. To access the itemized statements in [employer] ([employer]) go to billing/billing summary. From there select view account. There will be multiple tabs showing an overview of your account, detail, payments, and communications. From the communications tab you can see your monthly statements, your itemized statements, and any billing letters generated for your account. If you do not have a [employer] ([employer]) account and need help getting access please contact [employer] ([employer]) support at [phone].  If you would prefer to have your itemized statements mailed please contact our automated itemized bill request line at [phone] option  2.       Thank  you!    Thank you for choosing [facility] for your care. Our goal is always to provide you with excellent care. Hearing back from our patients is one way we can continue to improve our services. Please take a few minutes to complete the written survey that you may receive in the mail after you visit with us. Thank you!         Medication List     Notice    This visit is during an admission. Changes to the med list made in this visit will be reflected in the After Visit Summary of the admission.

## 2021-05-28 NOTE — PROGRESS NOTES
Patient is here for labs, provider and treatment for Follicular lymphoma grade i, lymph nodes of multiple sites.

## 2021-05-28 NOTE — PROCEDURES
[facility]    Procedure: IR LYMPH NODE BIOPSY    Date/Time: 5/28/2021 9:35 AM  Performed by: Eugenio Mujica PA-C  Authorized by: Eugenio Mujica PA-C     UNIVERSAL PROTOCOL   Site Marked: Yes  Prior Images Obtained and Reviewed:  Yes  Required items: Required blood products, implants, devices and special equipment available    Patient identity confirmed:  Verbally with patient, provided demographic data, hospital-assigned identification number and arm band  NA - No sedation, light sedation, or local anesthesia  Confirmation Checklist:  Patient's identity using two indicators, relevant allergies, procedure was appropriate and matched the consent or emergent situation and correct equipment/implants were available  Time out: Immediately prior to the procedure a time out was called    Universal Protocol: the Joint Commission Universal Protocol was followed    Preparation: Patient was prepped and draped in usual sterile fashion           ANESTHESIA    Anesthesia: Local infiltration  Local Anesthetic:  Lidocaine 1% without epinephrine  Anesthetic Total (mL):  3      SEDATION    Patient Sedated: No    See dictated procedure note for full details.  Findings: Tolerated local very well    Specimens: core needle biopsy specimens sent for pathological analysis (RPMI, formalin, research RNA, formalin, 8 cores total)    Complications: None    Condition: Stable    Plan: Per [facility]. Results pending    PROCEDURE   Patient Tolerance:  Patient tolerated the procedure well with no immediate complications  Describe Procedure: Ultrasound-guided right inguinal lymph node biopsy. 8 cores obtained and placed in preservative for pathology and research as well as flow cytometry.  Length of time physician/provider present for 1:1 monitoring during sedation: 0

## 2021-05-28 NOTE — PROGRESS NOTES
Juvenal came to chemo infusion this morning following port lab draw and NP visit for cycle 2 day 1 using Obinutuzumab, Doxorubicin, Vincristine and Cyclophosphamide.  Port maintained good blood return.  He received treatment as ordered and tolerated it well while in clinic. Port was flushed with ns, heparinized then deaccessed and site covered.  He received his Neulasta on body injector and patient education regarding this was reviewed and patient verbalizes good understanding.  Juvenal d/c from clinic ambulatory accompanied by his wife.  He is aware of his future appointment.

## 2021-05-28 NOTE — PROGRESS NOTES
Spoke with Juvenal and his wife following new transplant/cell therapy visit with Dr. Terry. Reviewed plan of care per NT conversation for FATE NK treatment. Explained role of the Nurse Coordinator throughout the BMT process as well as general time line and expectations for transplant. Discussed necessity of caregiver and program's proximity requirements. All questions were answered.     Plan: Cellular Therapy, FATE 516, pending insurance approval    Contact information provided for :  yes    HLA typing drawn: no    PRA typing drawn:  no    CMV-IgG and ABO-Rh drawn or in record:  no    Contact information provided for :  no    Financial Release for [facility] search obtained:  no    2938 Consent Signed: no    EOC Reason updated: yes

## 2021-05-28 NOTE — TELEPHONE ENCOUNTER
Nancy calls in on her 's behalf stating that he got his chemo on Monday and he got a fever of 102.0 on Tuesday.  She states that she thinks he needs something stronger than the Z-Miguelangel that his PCP gave to him on Tuesday.  He still has a cough and she feels like he is wheezing.  When I called him back to discuss, he tells me that he has not had a fever since Wednesday.  He has not had a fever since 24 hours into the antibiotic.  Per Dr Crooks, he should push fluids, get plenty of rest and eat chicken soup.  Listen to his body.  He is to call us back if his fever returns and is >100.5.  He verbalized understanding.    Estrella Vargas RN

## 2021-05-28 NOTE — PROGRESS NOTES
"0930 Juvenal arrived A&Ox4 ambulatory and stable, confirms he is here for IgG infusion \"to boost my immune system\".  Seated in chair #2. Pt is has been dx with lymphoma for the third time and states \"they're trying the IGG to boost my immune system\". POC/medication education reviewed, pt stated understanding and agreed to plan.   Port accessed w ease, excellent blood return noted and line easily flushed NS 10mL.  Juvenal was given pre-meds as ordered.   Starting at 1040 IgG infused at titrated rate with rate increases every 15minutes, max rate 262mL/hr. At approximately 1250 Juvenal reported having a headache, shoulder ache and aching at his port site, IgG would have been at max rate for approximately 45min by this time. IgG was stopped, VS assessed. IgG slowed to 202mL/hr x30min, pt reported his headache had diminished. Rate then increased to 235mL/hr x 30min, pt stated his headache was mostly gone. Rate then increased to his max 262mL/hr until completed. VS remained stable. Juvenal was monitored 30min post infusion and reported no adverse effects, VSS. Line flushed NS30mL. Port flushed NS20mL, instilled w heparin 6mL of 1:100 and gripper needle dc'd, site covered w clean dressing. He reported no aches at this time.  AVS/medication education reviewed and given, Juvenal stated understanding and that his needs were met today. He was able to eat lunch, was up to BR x2  Juvenal is scheduled for his second dose of IgG Tues 5/28, I discussed with him that he should mention to his RN at the next infusion that he had aches with rate increase at 15min intervals and we discussed maybe rate increases every 30min to help avoid the aches.   1525 Juvenal katz'd A&ox4 ambulatory and stable  "

## 2021-05-28 NOTE — PROGRESS NOTES
PT here for obinutuzumab infusion. PT states had fatigue and mild nausea last week after txt but those symptoms are gone. Port accessed labs drawn/ results reviewed. Premeds administered and then obinutuzumab started at 100 (25 ml per hour)mg per hour and increased by 100mg (25ml) every 30 minutes to max rate of 400 mg (100 ml per hour). PT tolerated infusion without any problems. Txt completed and tubing flushed with ns then heparin/deaccessed with 2x2 to site. Follow up reviewed and pt dc'd steady gait.

## 2021-05-28 NOTE — IP AVS SNAPSHOT
[facility]  [address] [facility] [address]  Phone: [phone]                                    After Visit Summary   5/28/2021    Mr. Juvenal Blake    MRN: [id number]           After Visit Summary Signature Page    I have received my discharge instructions, and my questions have been answered. I have discussed any challenges I see with this plan with the nurse or doctor.    ..........................................................................................................................................  Patient/Patient Representative Signature      ..........................................................................................................................................  Patient Representative Print Name and Relationship to Patient    ..................................................               ................................................  Date                                   Time    ..........................................................................................................................................  Reviewed by Signature/Title    ...................................................              ..............................................  Date                                               Time          22EPIC Rev 08/18

## 2021-05-28 NOTE — PROGRESS NOTES
[facility] Hematology and Oncology Progress Note    Patient: Juvenal Blake  MRN: [id number]  Date of Service: 04/22/19          Reason for Visit    Chief Complaint   Patient presents with     HE Cancer     Follicular lymphoma grade i, lymph nodes of multiple sites        Assessment and Plan    1.  Follicular lymphoma, low-grade.  Patient most recently has had progressive disease on maintenance Rituxan.  His progression was found in December and January.  He is now here to start chemotherapy.  We are going to go ahead and give him a obinutuzumab with CHOP chemo. We signed a consent today. They felt educated. They understands the risks and benefits of treatment.  they understand how to use the post medications for nausea and the prednisone 100mg daily for 5 days with each cycle of chemo.  they know the side effects include, but are not limited to: alopecia, diarrhea/constipation, nausea, vomiting, fatigue/weakness, myelosuppression, cardiac issues, peripheral neuropathy, taste alterations, poor appetite. They understand this is with palliative intent.  They seem a little confused about the follicular lymphoma but I did try to explain to him that the chemotherapy is just to put him into remission for as long as possible.  Tell him that his lymphoma does seem to be acting a little more aggressively than a typical follicular lymphoma.  Does understand that the obinutuzumab has a high risk of allergic reaction and it is given weekly for the first cycle and then every 3 weeks with the chemotherapy.  His MUGA scan was done on January 31 and ejection fraction was 65%.  Tell him we will likely do a PET scan after 3 cycles of chemo.  He will return tomorrow to start the chemotherapy.    2. Prostate cancer: s/p surgery and radiation, which finished around May 2018. PSA continues to be normal.  Last PSA was drawn on 3/5/19.    3. Recurrent sinus infections: Low IgA, IgG, IgM levels.  Dr. Tello from ENT suggested he get a  couple of doses of IVIG.  She is very concerned about him getting more infections when he is on chemotherapy.  We will try to schedule that on Mondays when he is not getting his chemo, which will be in 4 weeks. If he has infections before then, we will have to have him come in on a different day to get the IVIG. Phyllis Crooks did not feel like he needed prophylactic antibiotics. I did tell him to  allergy medication to help with some symptoms      ECOG Performance   ECOG Performance Status: 1    Distress Assessment  Distress Assessment Score: 4(new chemo regimen)    Pain  Currently in Pain: Yes  Pain Score (Initial OR Reassessment): 3  Location: shoulders and legs:       Problem List    1. Encounter for chemotherapy management  prochlorperazine (COMPAZINE) 10 MG tablet    predniSONE (DELTASONE) 50 MG tablet    lidocaine-prilocaine (EMLA) cream    CC OFFICE VISIT LONG    Infusion Appointment    CC OFFICE VISIT LONG    Infusion Appointment    CC OFFICE VISIT LONG   2. Follicular lymphoma grade i, lymph nodes of multiple sites (H)  prochlorperazine (COMPAZINE) 10 MG tablet    predniSONE (DELTASONE) 50 MG tablet    lidocaine-prilocaine (EMLA) cream    CC OFFICE VISIT LONG    Infusion Appointment    CC OFFICE VISIT LONG    Infusion Appointment    CC OFFICE VISIT LONG      ______________________________________________________________________________    History of Present Illness    Diagnosis:     1. Follicular lymphoma: Diagnosed in April, 2010 . Stage IV diagnosis with bone marrow involvement. Initial disease involved the cervical, supraclavicular, axillary, mesenteric mass, retroperitoneal nodes, and possibly the manubrium.    2.  Prostate cancer:  Pathologic stage T3b prostate cancer.  Positive margins, multiple, and surgery.  3 lymph nodes were negative.  High risk for recurrent disease    Treatment:     Lymphoma:  He had 6 cycles of bendamustine and Rituxan completed in October, 2010.  He had 2 years of  maintenance Rituxan therapy finishing in September, 2012.  Second course of bendamustine plus rituximab started February 20, 2017.  Cycle 6 was completed on July 11, 2017.    He is now on maintenance rituximab.     Prostate: Initial radical prostatectomy.  November 15, 2017.  Prostatic adenocarcinoma [place] 4+3 = 7 with tertiary pattern 5.  Tumor involves 45% of total surface area.  Positive for extensive extraprostatic extension.  Positive for bilateral seminal vesicle invasion.  Multiple margins positive.  Lymphovascular invasion not identified.  Lymph nodes negative.  3 were examined.  He received adjuvant radiation and finished around May 2018.    Interim History:  Juvenal returns to the clinic.  He is here to follow-up on CT scan and to start chemotherapy.  States that he was going to start in February but he got a new job and he was not really able to get the time off so now he is here to start after he can take some time off from work.  Is lots of questions about starting the chemotherapy and the plan as well as getting some immunotherapy.  He has had many recurrent sinus infections.  Was seen by Dr. Gaitan he found his IgG levels low, CT scan that showed inflammatory changes in his sinuses.  He was very concerned about him getting on chemotherapy and suppressing his immune system getting more infections.  She has questions about his latest CT scan to evaluate the lymphoma.  He also has questions about the plan for all of these medications.    Pain Status  Currently in Pain: Yes    Review of Systems  Constitutional  Constitutional (WDL): All constitutional elements are within defined limits  Fatigue: No Concerns  Fever: No Concerns  Chills: No Concerns  Weight Gain: No Concerns  Weight Loss: No Concerns  Hot flashes/Night Sweats: No Concerns  Neurosensory  Neurosensory (WDL): All neurosensory elements are within defined limits  Peripheral Motor Neuropathy: No Concerns  Ataxia: No Concerns  Peripheral Sensory  Neuropathy: No Concerns  Confusion: No Concerns  Dizziness: No Concerns  Syncope: No Concerns  Eye   Eye Disorder (WDL): All eye disorder elements are within defined limits  Blurred Vision: No Concerns  Dry Eye: No Concerns  Eye Pain: No Concerns  Watering Eyes: No Concerns  Ear  Ear Disorder (WDL): All ear disorder elements are within defined limits  Ear Pain: No Concerns  Tinnitus: No Concerns  Cardiovascular  Cardiovascular (WDL): All cardiovascular elements are within defined limits  Palpitations: No Concerns  Edema: No Concerns  SVT, DVT/PE: No Concerns  Chest Pain - Cardiac: No Concerns  Pulmonary  Respiratory (WDL): Exceptions to WDL  Cough: Concerns  Dyspnea: No Concerns  Hypoxia: No Concerns  Gastrointestinal  Gastrointestinal (WDL): All gastrointestinal elements are within defined limits  Anorexia: No Concerns  Nausea: No Concerns  Vomiting: No Concerns  Dehydration: No Concerns  Dysgeusia: No Concerns  Dysphagia: No Concerns  Mucositis Oral: No Concerns  Esophagitis: No Concerns  Constipation: No Concerns  Diarrhea: No Concerns  Pharyngitis: No Concerns  Dry Mouth: No Concerns  Genitourinary  Genitourinary (WDL): All genitourinary elements are within defined limits  Urinary Frequency: No Concerns  Urinary Retention: No Concerns  Urinary Tract Pain: No Concerns  Lymphatic  Lymph (WDL): Exceptions to WDL  Lymphedema: No Concerns  Lymph Node Discomfort: Concerns  Musculoskeletal and Connective Tissue  Musculoskeletal and Connetive Tissue Disorders (WDL): Exceptions to WDL  Arthralgia: Concerns  Bone Pain: No Concerns  Muscle Weakness : No Concerns  Myalgia: Concerns  Integumentary  Integumentary (WDL): Exceptions to WDL(red rash on right lower leg; noticed 4/21/19)  Alopecia: No Concerns  Rash Maculo-Papular: No Concerns  Pruritus: No Concerns  Urticaria: No Concerns  Palmar-Plantar Erythrodysesthesia Syndrome: No Concerns  Flushing: No Concerns  Patient Coping  Patient Coping: Accepting  Accompanied  by  Accompanied by: Family Member  Oral Chemo Adherence         Past History  Past Medical History:   Diagnosis Date     Arthritis     shoulder per H & P     GERD (gastroesophageal reflux disease)      H/O pyloric stenosis     as an infant per H & P      Inguinal hernia     per H & P      Lazy eye     per H & P      Non Hodgkin's lymphoma (H)      Prostate CA (H)      Sleep apnea     CPAP       PHYSICAL EXAM:  /74   Pulse 62   Temp 97.6  F (36.4  C) (Oral)   Wt (!) 255 lb 4.8 oz (115.8 kg)   SpO2 96%   BMI 37.70 kg/m    GENERAL: no acute distress. Cooperative in conversation. Here with wife  No exam done today    Lab Results    Recent Results (from the past 168 hour(s))   Comprehensive Metabolic Panel   Result Value Ref Range    Sodium 142 136 - 145 mmol/L    Potassium 4.4 3.5 - 5.0 mmol/L    Chloride 108 (H) 98 - 107 mmol/L    CO2 27 22 - 31 mmol/L    Anion Gap, Calculation 7 5 - 18 mmol/L    Glucose 124 70 - 125 mg/dL    BUN 14 8 - 22 mg/dL    Creatinine 0.93 0.70 - 1.30 mg/dL    GFR MDRD Af Amer >60 >60 mL/min/1.73m2    GFR MDRD Non Af Amer >60 >60 mL/min/1.73m2    Bilirubin, Total 0.4 0.0 - 1.0 mg/dL    Calcium 9.5 8.5 - 10.5 mg/dL    Protein, Total 6.1 6.0 - 8.0 g/dL    Albumin 3.9 3.5 - 5.0 g/dL    Alkaline Phosphatase 77 45 - 120 U/L    AST 22 0 - 40 U/L    ALT 35 0 - 45 U/L   HM1 (CBC with Diff)   Result Value Ref Range    WBC 4.9 4.0 - 11.0 thou/uL    RBC 4.07 (L) 4.40 - 6.20 mill/uL    Hemoglobin 12.5 (L) 14.0 - 18.0 g/dL    Hematocrit 37.5 (L) 40.0 - 54.0 %    MCV 92 80 - 100 fL    MCH 30.7 27.0 - 34.0 pg    MCHC 33.3 32.0 - 36.0 g/dL    RDW 14.4 11.0 - 14.5 %    Platelets 165 140 - 440 thou/uL    MPV 9.3 8.5 - 12.5 fL   Manual Differential   Result Value Ref Range    Total Neutrophils % 77 (H) 50 - 70 %    Lymphocytes % 13 (L) 20 - 40 %    Monocytes % 5 2 - 10 %    Eosinophils %  4 0 - 6 %    Basophils % 1 0 - 2 %    Total Neutrophils Absolute 3.8 2.0 - 7.7 thou/ul    Lymphocytes Absolute 0.6  (L) 0.8 - 4.4 thou/uL    Monocytes Absolute 0.2 0.0 - 0.9 thou/uL    Eosinophils Absolute 0.2 0.0 - 0.4 thou/uL    Basophils Absolute 0.0 0.0 - 0.2 thou/uL    Platelet Estimate Normal Normal       Imaging    Ct Chest Abdomen Pelvis Without Oral With Iv Contrast    Result Date: 4/8/2019  EXAM: CT CHEST ABDOMEN PELVIS WO ORAL W IV CONTRAST LOCATION: [facility] DATE/TIME: 4/8/2019 1:57 PM INDICATION: Follicular Lymphoma COMPARISON: 12/31/2018 TECHNIQUE: Helical thin-section CT scan of the chest, abdomen, and pelvis was performed following injection of IV contrast. Multiplanar reformats were obtained. Dose reduction techniques were used. CONTRAST: Iohexol (Omni) 100 mL FINDINGS: CHEST: Lungs are clear. No mediastinal lymphadenopathy.  ABDOMEN: Soft tissue stranding throughout the mesentery and retroperitoneum from previously treated lymphoma. Mildly enlarging retroperitoneal lymph nodes in the lower abdomen at the level just above the aortic bifurcation. Largest node on image 345 measures 2.3 x 2.0 cm previously 8 mm. No significant findings in the liver, spleen, pancreas, kidneys, or adrenal glands. PELVIS: Enlarging iliac and inguinal lymphadenopathy. Largest right inguinal node measures 3.3 cm on image 507 previously 2.2 cm largest right common iliac node measures 2.0 cm on image 388 previously 7 mm. MUSCULOSKELETAL: Negative.     CONCLUSION: 1.  Progression of lymphadenopathy in the lower retroperitoneum and right side of the pelvis and inguinal canal since 12/31/2018.      Total time spent with patient was 35 minutes.  Greater than 50% of that was in counseling and care coordination.    Signed by: Madhavi Kurtz, CNP

## 2021-05-28 NOTE — PROGRESS NOTES
PT here ambulatory with wife for txt. Reviewed txt and duration with pt. Port accessed sterile technique and has brisk blood return/smooth ns flush. Premeds given see MAR. Gazyva initiated at 12.5 ml(50mg)per hour and increased every 30 minutes by 12.5 ml(50mg) to max rate of 400 mg per hour. Positive blood return throughout adriamycin ivp. PT tolerated remainder of txt without any problems. Neulasta injector placed on and reviewed with pt that it will start infusing [id number] and be complete around 515 pm. PT watched video. Txt completed and tubing flushed with ns then port flushed with heparin/deaccessed with 2x2 to site. Follow up reviewed and pt dc'd steady gait with wife.

## 2021-05-28 NOTE — PROGRESS NOTES
[facility] Hematology and Oncology Progress Note    Patient: Juvenal Blake  MRN: [id number]  Date of Service: 05/13/19          Reason for Visit    Chief Complaint   Patient presents with     HE Cancer     Follicular lymphoma grade i, lymph nodes of multiple sites       Assessment and Plan    1.  Follicular lymphoma, low-grade.  Patient most recently has had progressive disease on maintenance Rituxan.  His progression was found in December and January.  We we will continue obinutuzumab with CHOP chemo.  He will get cycle 2 today at full dose.  He will see Dr. Crooks for cycle 3.  We will then do aging after the third cycle    2. Prostate cancer: s/p surgery and radiation, which finished around May 2018. PSA continues to be normal.  Last PSA was drawn on 3/5/19.  He was due to see his urologist to wanted another PSA drawn which we did and will send over to him.    3. Recurrent sinus infections: Low IgA, IgG, IgM levels.  Dr. Tello from ENT suggested he get a couple of doses of IVIG.  She is very concerned about him getting more infections when he is on chemotherapy.  It is first dose next Monday and then a second dose the following Monday.  We will probably check his eye GG level.  And monitor for sinus infections.    4. Mild nausea, generlized pain: continue antiemetics, OTC tylenol/ibuprofen. requesting medical cannabis. I will sign him up.       ECOG Performance   ECOG Performance Status: 1    Distress Assessment  Distress Assessment Score: 3(wondering if chemo is working; loss of hair)    Pain  Currently in Pain: Yes  Pain Score (Initial OR Reassessment): 3  Location: shoulders:       Problem List    1. Prostate cancer (H)  PSA, Diagnostic (Prostatic-Specific Antigen)   2. Encounter for chemotherapy management  CC OFFICE VISIT LONG    CC OFFICE VISIT LONG    Infusion Appointment    DISCONTINUED: sodium chloride 0.9% 250 mL infusion    DISCONTINUED: palonosetron injection 0.25 mg (ALOXI)    DISCONTINUED:  fosaprepitant 150 mg in sodium chloride 0.9% 150 mL (EMEND)    DISCONTINUED: acetaminophen tablet 1,000 mg (TYLENOL)    DISCONTINUED: obinutuzumab 1,000 mg in sodium chloride 0.9% 250 mL (GAZYVA)    DISCONTINUED: DOXOrubicin chemo 120 mg (ADRIAMYCIN)    DISCONTINUED: vinCRIStine 2 mg in sodium chloride 0.9% 25 mL chemo (ONCOVIN)    DISCONTINUED: cyclophosphamide 1,750 mg in sodium chloride 0.9% 250 mL chemo (CYTOXAN)    DISCONTINUED: pegfilgrastim injection 6 mg (NEULASTA DELIVERY KIT)    DISCONTINUED: sodium chloride flush 20 mL (NS)    DISCONTINUED: heparin lockflush (PF) porcine 300-600 Units    DISCONTINUED: diphenhydrAMINE injection 50 mg (BENADRYL)    DISCONTINUED: famotidine 20 mg/2 mL injection 20 mg (PEPCID)    DISCONTINUED: hydrocortisone sod succ (PF) injection 100 mg    DISCONTINUED: acetaminophen tablet 1,000 mg (TYLENOL)    DISCONTINUED: sodium chloride 0.9% 500 mL   3. Follicular lymphoma grade i, lymph nodes of multiple sites (H)  CC OFFICE VISIT LONG    CC OFFICE VISIT LONG    Infusion Appointment    DISCONTINUED: sodium chloride 0.9% 250 mL infusion    DISCONTINUED: palonosetron injection 0.25 mg (ALOXI)    DISCONTINUED: fosaprepitant 150 mg in sodium chloride 0.9% 150 mL (EMEND)    DISCONTINUED: acetaminophen tablet 1,000 mg (TYLENOL)    DISCONTINUED: obinutuzumab 1,000 mg in sodium chloride 0.9% 250 mL (GAZYVA)    DISCONTINUED: DOXOrubicin chemo 120 mg (ADRIAMYCIN)    DISCONTINUED: vinCRIStine 2 mg in sodium chloride 0.9% 25 mL chemo (ONCOVIN)    DISCONTINUED: cyclophosphamide 1,750 mg in sodium chloride 0.9% 250 mL chemo (CYTOXAN)    DISCONTINUED: pegfilgrastim injection 6 mg (NEULASTA DELIVERY KIT)    DISCONTINUED: sodium chloride flush 20 mL (NS)    DISCONTINUED: heparin lockflush (PF) porcine 300-600 Units    DISCONTINUED: diphenhydrAMINE injection 50 mg (BENADRYL)    DISCONTINUED: famotidine 20 mg/2 mL injection 20 mg (PEPCID)    DISCONTINUED: hydrocortisone sod succ (PF) injection 100 mg     DISCONTINUED: acetaminophen tablet 1,000 mg (TYLENOL)    DISCONTINUED: sodium chloride 0.9% 500 mL      ______________________________________________________________________________    History of Present Illness    Diagnosis:     1. Follicular lymphoma: Diagnosed in April, 2010 . Stage IV diagnosis with bone marrow involvement. Initial disease involved the cervical, supraclavicular, axillary, mesenteric mass, retroperitoneal nodes, and possibly the manubrium.    2.  Prostate cancer:  Pathologic stage T3b prostate cancer.  Positive margins, multiple, and surgery.  3 lymph nodes were negative.  High risk for recurrent disease    Treatment:   Now started on Obinutuzumab and CHOP. Today is cycle 2. Progression on maintenance rituxan.     Lymphoma:  He had 6 cycles of bendamustine and Rituxan completed in October, 2010.  He had 2 years of maintenance Rituxan therapy finishing in September, 2012.  Second course of bendamustine plus rituximab started February 20, 2017.  Cycle 6 was completed on July 11, 2017.    He is now on maintenance rituximab.     Prostate: Initial radical prostatectomy.  November 15, 2017.  Prostatic adenocarcinoma Haddock 4+3 = 7 with tertiary pattern 5.  Tumor involves 45% of total surface area.  Positive for extensive extraprostatic extension.  Positive for bilateral seminal vesicle invasion.  Multiple margins positive.  Lymphovascular invasion not identified.  Lymph nodes negative.  3 were examined.  He received adjuvant radiation and finished around May 2018.    Interim History:  Juvenal returns to the clinic today to continue on chemo.  He states that it went okay.  He did have a couple of days where he felt really tired, had a lot of muscle cramping that jumped around to different areas in his body, poor appetite.    Pain Status  Currently in Pain: Yes    Review of Systems  Constitutional  Constitutional (WDL): All constitutional elements are within defined limits  Fatigue: No Concerns  Fever:  No Concerns  Chills: No Concerns  Weight Gain: No Concerns  Weight Loss: No Concerns  Hot flashes/Night Sweats: No Concerns  Neurosensory  Neurosensory (WDL): All neurosensory elements are within defined limits  Peripheral Motor Neuropathy: No Concerns  Ataxia: No Concerns  Peripheral Sensory Neuropathy: No Concerns  Confusion: No Concerns  Dizziness: No Concerns  Syncope: No Concerns  Eye   Eye Disorder (WDL): All eye disorder elements are within defined limits  Blurred Vision: No Concerns  Dry Eye: No Concerns  Eye Pain: No Concerns  Watering Eyes: No Concerns  Ear  Ear Disorder (WDL): Exceptions to WDL  Ear Pain: Concerns(related to sinus )  Tinnitus: No Concerns  Cardiovascular  Cardiovascular (WDL): All cardiovascular elements are within defined limits  Palpitations: No Concerns  Edema: No Concerns  SVT, DVT/PE: No Concerns  Chest Pain - Cardiac: No Concerns  Pulmonary  Respiratory (WDL): Exceptions to WDL  Cough: Concerns(improving)  Dyspnea: Concerns  Hypoxia: No Concerns  Gastrointestinal  Gastrointestinal (WDL): All gastrointestinal elements are within defined limits  Anorexia: No Concerns  Nausea: No Concerns  Vomiting: No Concerns  Dehydration: No Concerns  Dysgeusia: No Concerns  Dysphagia: No Concerns  Mucositis Oral: Concerns(still feels one in mouth)  Esophagitis: No Concerns  Constipation: No Concerns  Diarrhea: Concerns(sometimes)  Pharyngitis: No Concerns  Dry Mouth: Concerns  Genitourinary  Genitourinary (WDL): All genitourinary elements are within defined limits  Urinary Frequency: No Concerns  Urinary Retention: No Concerns  Urinary Tract Pain: No Concerns  Lymphatic  Lymph (WDL): All lymph disorder elements are within defined limits  Lymphedema: No Concerns  Lymph Node Discomfort: No Concerns  Musculoskeletal and Connective Tissue  Musculoskeletal and Connetive Tissue Disorders (WDL): Exceptions to WDL  Arthralgia: Concerns(shoulders)  Bone Pain: No Concerns  Muscle Weakness : No  Concerns  Myalgia: Concerns  Integumentary  Integumentary (WDL): Exceptions to WDL  Alopecia: Concerns  Rash Maculo-Papular: No Concerns  Pruritus: No Concerns  Urticaria: No Concerns  Palmar-Plantar Erythrodysesthesia Syndrome: No Concerns  Flushing: No Concerns  Patient Coping  Patient Coping: Accepting  Accompanied by  Accompanied by: Family Member  Oral Chemo Adherence         Past History  Past Medical History:   Diagnosis Date     Arthritis     shoulder per H & P     GERD (gastroesophageal reflux disease)      H/O pyloric stenosis     as an infant per H & P      Inguinal hernia     per H & P      Lazy eye     per H & P      Non Hodgkin's lymphoma (H)      Prostate CA (H)      Sleep apnea     CPAP     PHYSICAL EXAM:  /78   Pulse 70   Temp 98.2  F (36.8  C) (Oral)   Wt (!) 246 lb 12.8 oz (111.9 kg)   SpO2 98%   BMI 36.45 kg/m    GENERAL: no acute distress. Cooperative in conversation. Here with wife  HEENT: pupils are equal, round and reactive. Oromucosa is clean and intact. No ulcerations or mucositis noted. No bleeding noted.  RESP: lungs are clear bilaterally per auscultation. Regular respiratory rate. No wheezes or rhonchi.  CV: Regular, rate and rhythm. No murmurs.  ABD: soft, nontender. Positive bowel sounds. No organomegaly.   MUSCULOSKELETAL: No lower extremity swelling.   NEURO: non focal. Alert and oriented x3.   PSYCH: within normal limits. No depression or anxiety.  SKIN: warm dry intact, port is CDI  LYMPH: no cervical, supraclavicular lymphadenopathy          Lab Results    Recent Results (from the past 168 hour(s))   Comprehensive Metabolic Panel   Result Value Ref Range    Sodium 139 136 - 145 mmol/L    Potassium 4.1 3.5 - 5.0 mmol/L    Chloride 105 98 - 107 mmol/L    CO2 24 22 - 31 mmol/L    Anion Gap, Calculation 10 5 - 18 mmol/L    Glucose 139 (H) 70 - 125 mg/dL    BUN 16 8 - 22 mg/dL    Creatinine 1.08 0.70 - 1.30 mg/dL    GFR MDRD Af Amer >60 >60 mL/min/1.73m2    GFR MDRD Non Af  Amer >60 >60 mL/min/1.73m2    Bilirubin, Total 0.4 0.0 - 1.0 mg/dL    Calcium 9.4 8.5 - 10.5 mg/dL    Protein, Total 6.1 6.0 - 8.0 g/dL    Albumin 3.7 3.5 - 5.0 g/dL    Alkaline Phosphatase 71 45 - 120 U/L    AST 28 0 - 40 U/L    ALT 44 0 - 45 U/L   HM1 (CBC with Diff)   Result Value Ref Range    WBC 4.5 4.0 - 11.0 thou/uL    RBC 3.80 (L) 4.40 - 6.20 mill/uL    Hemoglobin 11.5 (L) 14.0 - 18.0 g/dL    Hematocrit 34.8 (L) 40.0 - 54.0 %    MCV 92 80 - 100 fL    MCH 30.3 27.0 - 34.0 pg    MCHC 33.0 32.0 - 36.0 g/dL    RDW 14.2 11.0 - 14.5 %    Platelets 198 140 - 440 thou/uL    MPV 8.9 8.5 - 12.5 fL   Manual Differential   Result Value Ref Range    Total Neutrophils % 77 (H) 50 - 70 %    Lymphocytes % 10 (L) 20 - 40 %    Monocytes % 8 2 - 10 %    Eosinophils %  1 0 - 6 %    Basophils % 0 0 - 2 %    Metamyelocytes % 3 (H) <=1 %    Myelocytes % 1 <=1 %    Total Neutrophils Absolute 3.5 2.0 - 7.7 thou/ul    Lymphocytes Absolute 0.5 (L) 0.8 - 4.4 thou/uL    Monocytes Absolute 0.4 0.0 - 0.9 thou/uL    Eosinophils Absolute 0.0 0.0 - 0.4 thou/uL    Basophils Absolute 0.0 0.0 - 0.2 thou/uL    Metamyelocytes Absolute 0.1 <=0.1 thou/uL    Myelocytes Absolute 0.0 <=0.1 thou/uL    Platelet Estimate Normal Normal       Imaging    No results found.        Signed by: Madhavi Kurtz, CNP

## 2021-05-28 NOTE — PROGRESS NOTES
I stopped in to see Juvenal today at his infusion appointment.  I reminded him of my role and what I am able to provide for support.  He has all the resources and support he needs at this time.  I gave him my contact information and encouraged he contact me if needed for assistance.

## 2021-05-28 NOTE — DISCHARGE INSTRUCTIONS
Invasive Radiology Procedures Discharge Instructions         _____________________________________________________________________    Patient Name:  Juvenal Blake  Today's Date:  May 28, 2021    The doctor who did your procedure today was Beto Mujica PA-C.    Procedure:  Biopsy of  Right inguinal lymph node    If you have not received your results after 5 days, please call the doctor who ordered your test.  Diet and medicines    Go back to your normal diet.    You may start taking your normal medicines again (including Coumadin, or warfarin), as shown on your medicine sheet.    If you take aspirin, Plavix or other anti-platelet drugs: Start taking it tomorrow.    If take Coumadin (warfarin): Ask your doctor when to have your INR checked.    For minor pain, you may take Tylenol (acetaminophen) or Advil (ibuprofen).    Activity and puncture site     You may go back to normal activity in 24 hours. Wait 48 hours before lifting,   straining, exercise or other strenuous activity.    For the next day or two, check your puncture site often while you are awake.    Change the Band-Aid or bandage tomorrow.    You may shower tomorrow morning. No bathing or swimming until your   puncture site has fully healed.    If you received IV medicine to sedate you:  You were given: No Medication  You may feel drowsy, forgetful or unsteady. For the next 24 hours, do not drive, drink alcohol or make any important decisions.    Know when to call for help  Call your doctor if you have:    A fever greater over 101 F (38.3 C), taken under the tongue.    A lot of bleeding or swelling at your puncture site.    Pain that is getting worse.     Shortness of breath.  Call 911 or go the emergency room if you have:    Severe chest pain or trouble breathing.    A tube that falls out.     Increased blood in your sputum (phlegm).    Bleeding that you cannot control.   Important phone numbers:  [facility] at   [phone]

## 2021-05-28 NOTE — PROGRESS NOTES
Pt ambulatory here for txt. PT reports having a tough week after txt last Monday.PT has a mouth sore, had episode of diarrhea, nausea relieved with antinausea perscription, muscle cramps and ear ache with fever. PT was started on pencillin 2 days ago. Symptoms are resolved except mouth sore. Magic mouthwash called in for pt.port accessed. txt reviewed and administered as ordered and pt tolerated txt without any problems. Tubing flushed with ns then port flushed with heparin/deaccessed with 2x2 to site. Follow up reviewed and pt dc'd steady gait.

## 2021-05-28 NOTE — PROGRESS NOTES
Blood and Marrow Transplant   New Transplant Visit with   Clinical     Assessment completed on 21 via phone as part of the COVID 19 protocol. Assessment of living situation, support system, financial status, functional status, coping, stressors, need for resources and social work intervention provided as needed. Information for this assessment was provided by pt and pt's spouse's report in addition to medical chart review and consultation with medical team.     Present:  Patient: Juvenal Blake  Spouse: Nancy Blake  : CITLALLI Cunningham, [facility]    Medical Team   Nurse Coordinator: Arlen Lui RN  BMT Physician: Rosa Terry MD  Referring Physician: Gage Corral MD    Presenting Information:  Pt is a 55 year old male diagnosed with NHL. Pt was initially diagnosed with follicular lymphoma in 2020. Pt noted to have recurrent relapses in , , , & now May 2021. Pt had NAM/NK infusion on 2020. Pt presents for a FATE 516 cellular infusion discussion.    Contact Information:  Cell Phone: [phone]  Home Phone: [phone]    Special Needs/Additional Information:   Pt endorsed he would like his BMT team to be aware that his wife was recently found to have breast cancer. She underwent a lumpectomy and is anticipated to have another lumpectomy in approximately 2 weeks with plan for 6 weeks of radiation M-F. They endorse additional family support as needed (2 sons local).     Relocation Requirement:   Pt lives in [place], [place] which is within the required distance of the hospital. Pt does not need to relocate.     Family Information:   Spouse: Nancy Blake  Children: 2 biological son and 2 step sons  Parents:   Siblings:  3 siblings- 1 brother and 2 sisters    Education/Employment:  Currently employed: No; Pt has not worked since last year due to treatment and side effects. Pt states he is currently receiving SSDI and his pension.  Employer: [employer]  Occupation:     Spouse/Partner Employed: Yes - has summers off; last day of school 6/11/21.  Employer: [employer]  Occupation: Teacher    Insurance:   [employer]. No insurance concerns identified at this time. SW provided information regarding the insurance authorization process and the role of the [employer] Financial . SW provided contact info for the [employer]  and referred pt to them for future insurance questions.     Finances:   Pt and spouse are supported by spouse's employment income, pension, and SSDI. No financial concerns identified at this time. SW discussed ryan options and asked pt to let SW know if they would like to apply in the future.     Caregiver:   SW discussed with the patient & spouse the caregiver role and expectation at length. Pt is agreeable to having a full time caregiver for the minimum of 30 days until cleared by the BMT Physician. Pt's identified caregiver is his wife Nancy with their 2 local sons for additional support as needed. Caregiver education and information provided. No caregiver concerns identified.     Healthcare Directive:  No. SW provided education and forms. SW encouraged pt to have discussions with their family regarding their health care wishes.  In the absence of a healthcare document, SW discussed the Pet[place] Policy on who would make decisions on his behalf if he did not have the capacity to make healthcare decisions.    Resources Provided:  -BMT Information Book  -BMT Resources Packet  -Healthcare Directive  -Honoring Choices - Your Rights: Making Your Own Health Care Treatment Decisions  -Caregiver Contract/Description  -Transplant Unit Description and Information     Identified Concerns:  No concerns identified at this time.     Summary:  Pt presents to [facility] regarding a FATE 516 cellular infusion. Pt and pt's spouse asked good/appropriate questions  "regarding psychosocial factors related to cellular infusion; all questions were addressed. Pt presented as pleasant, calm, and engaged. Pt's affect was appropriate. Pt acknowledged feeling disappointment w/ relapse and need for additional treatment. He states he is \"hopeful this will work this time.\" Pt endorsed he would like the BMT team to be aware of his wife's own medical needs at this time as well. Family's affect was involved, supportive, and appropriate.    Plan:   SW provided contact information and encouraged pt to contact SW with any additional questions, concerns, resources and/or for support. SW will continue to follow pt to provide support and guidance with resources as needed.     CITLALLI Cunningham, BERNIE  Adult Blood & Marrow Transplant   Phone: [phone]  Pager: [phone]    "

## 2021-05-29 LAB — COPATH REPORT: NORMAL

## 2021-05-29 NOTE — TELEPHONE ENCOUNTER
Spoke to patient wife, gave results of recent PSA.  Pt wife verbalized understanding and will pass this on to patient.

## 2021-05-29 NOTE — TELEPHONE ENCOUNTER
Patient had called in last night with fever and chills after getting IVIG yesterday.  Dr Richardson got the call and told him to take tylenol.  I had sent a [facility] message this morning after he had sent a few in and did not hear back from him so I called him this afternoon.  He states that after the tylenol last night he felt better.  He is doing just fine today.  No other concerns at this time.    Estrella Vargas RN

## 2021-05-29 NOTE — PROGRESS NOTES
Pt ambulatory to [facility] infusion for infusion. Port accessed without difficulty. Pt had difficulty last infusion with headache, itching, and anxiety. Drug given slower and pt tolerated without any difficulty. Pt had slight headache on discharge and reminded that he could take Tylenol as needed. Port de accessed and pt given copy of AVS. D/C'd ambulatory without any complaints.

## 2021-05-29 NOTE — PROGRESS NOTES
Patient here today labs and follow-up visit with Dr. Crooks for follicular lymphoma/JOSELITO Valle RN

## 2021-05-29 NOTE — PROGRESS NOTES
Pt here for C3 O-chop. Pt saw MD Friday. No problem with treatment as directed. neulasta injector applied to L arm and pt aware of care. Pt notes nausea from treatment and has medical marijuana and will try with this cycle. Upon completion port flushed and deaccessed and pt d/c ambulatory to lobby with his wife. Pt aware of treatment plan.

## 2021-05-30 ENCOUNTER — TELEPHONE (OUTPATIENT)
Dept: NURSING | Facility: CLINIC | Age: 56
End: 2021-05-30

## 2021-05-30 VITALS — BODY MASS INDEX: 36.69 KG/M2 | WEIGHT: 241.3 LBS

## 2021-05-30 VITALS — WEIGHT: 245.2 LBS | BODY MASS INDEX: 37.16 KG/M2 | HEIGHT: 68 IN

## 2021-05-30 VITALS — WEIGHT: 245.6 LBS | BODY MASS INDEX: 37.34 KG/M2

## 2021-05-30 VITALS — WEIGHT: 247 LBS | BODY MASS INDEX: 37.56 KG/M2

## 2021-05-30 VITALS — BODY MASS INDEX: 34.25 KG/M2 | HEIGHT: 68 IN | WEIGHT: 226 LBS

## 2021-05-30 VITALS — BODY MASS INDEX: 37.13 KG/M2 | WEIGHT: 245 LBS | HEIGHT: 68 IN

## 2021-05-30 VITALS — BODY MASS INDEX: 36.22 KG/M2 | WEIGHT: 238.2 LBS

## 2021-05-30 NOTE — TELEPHONE ENCOUNTER
"Nancy calls to inform us that Juvenal has been ill for several days and he had chemo last Friday.  She reports he is coughing and was seen by his PMD on Monday and received Azithromycin.  Today, Juvenal is not feeling better and Nancy reports he has a temperature of \"almost 100 degrees\".  She reports Juvenal returned to [facility] and was seen today and had a \"low white count\" but could not recall the number.  According to Nancy - the doctor said Juvenal has a sinus infection and bronchitis and prescribed a different antibiotic but she cannot recall the name.  The doctor that Juvenal saw today advised that they call our office to let us know the patient is ill.  Information noted as received and Dr Crooks is aware.  Advised to report to ER or Urgent care if fever of over 100.5, chills, body aches, fatigue or failure to improve.  Pt wife verbalized understanding.    "

## 2021-05-30 NOTE — PROGRESS NOTES
Pt here ambulatory for txt. Labs approved for txt. txt reviewed and administered as ordered. PT tolerated txt without any problems. Txt administered via port access. txt completed and port flushed/deaccessed with 2x2 to site. Neulasta injector placed on left upper arm and instructed pt that it will start around 530 pm tomorrow and be completed around 615. Follow up reviewed and pt dc'd steady gait.

## 2021-05-30 NOTE — PROGRESS NOTES
[facility] Hematology and Oncology Progress Note    Patient: Juvenal Blake  MRN: [id number]  Date of Service: June 28, 2019      Assessment and Plan:    1.  Follicular lymphoma: He has completed 3 cycles of O-CHOP.  PET/CT was reviewed.  Shows complete remission.  He will receive cycle 4 today.  6 cycles will be planned in total.  Remains at 100% dosing.  PET scan after cycle 6.  We will discuss maintenance obinutuzumab at that point. Continues to tolerate his treatment well.    2.  Prostate cancer: He finished radiation in early May.  Incontinence/dribbling.  Still needs to wear pads.  Still has some sexual dysfunction.  PSA is 0.1.  We will continue to follow.  I reviewed with him the plan going forward in case his PSA continues to increase.    ECOG Performance   ECOG Performance Status: 1    Distress Assessment  Distress Assessment Score: 5(results)June 28, 2019    Pain  Currently in Pain: No/denies    Diagnosis:    1.  Follicular lymphoma: Diagnosed in April, 2010 . Stage IV diagnosis with bone marrow involvement.  Initial disease involved the cervical, supraclavicular, axillary, mesenteric mass, retroperitoneal nodes, and possibly the manubrium.  Relapse noted on imaging in December 2016.  Second relapse on imaging January 2019.  Repeat biopsy January 7, 2019 shows recurrent low-grade follicular lymphoma.  Grade 2.    2.  Prostate cancer:  Pathologic stage T3b prostate cancer.  Positive margins, multiple, and surgery.  3 lymph nodes were negative.  High risk for recurrent disease.  He is being followed at [place] [facility].    3.  Hypogammaglobulinemia: He received 2 doses of IVIG in late May.  He states since then he has been feeling well from a sinus standpoint but feels like he might be getting a chest cold just now.  We will follow clinically and give IVIG as needed.    Treatment:    Lymphoma:    He had 6 cycles of bendamustine and Rituxan completed in October, 2010.  He had 2 years of maintenance Rituxan  therapy finishing in September, 2012.  Second course of bendamustine plus rituximab started February 20, 2017.  Cycle 6 was completed on July 11, 2017.    Then had maintenance rituximab.    Prostate:   Initial radical prostatectomy.  November 15, 2017.  Prostatic adenocarcinoma [place] 4+3 = 7 with tertiary pattern 5.  Tumor involves 45% of total surface area.  Positive for extensive extraprostatic extension.  Positive for bilateral seminal vesicle invasion.  Multiple margins positive.  Lymphovascular invasion not identified.  Lymph nodes negative.  3 were examined.  Completed radiation at [facility] early May 2018    Interim History:    Juvenal returns today for follow-up visit.  Overall he is been doing okay.  No acute complaints today.  Does have some decreased energy and appetite after treatment.  No mouth sores or rash.  No fevers or chills.    Review of Systems:    Constitutional  Constitutional (WDL): Exceptions to WDL  Fatigue: Fatigue relieved by rest  Neurosensory  Neurosensory (WDL): Exceptions to WDL  Peripheral Sensory Neuropathy: Asymptomatic, loss of deep tendon reflexes or paresthesia(feet)  Cardiovascular  Cardiovascular (WDL): Exceptions to WDL  Edema: Yes  Pulmonary  Respiratory (WDL): Exceptions to WDL  Cough: Mild symptoms, nonprescription intervention indicated  Dyspnea: Shortness of breath with moderate exertion  Gastrointestinal  Gastrointestinal (WDL): Exceptions to WDL  Constipation: Occasional or intermittent symptoms, occasional use of stool softeners, laxatives, dietary modification, or enema  Nausea: Loss of appetite without alteration in eating habits  Dysgeusia: Altered taste but no change in diet  Genitourinary  Genitourinary (WDL): All genitourinary elements are within defined limits  Integumentary  Integumentary (WDL): All integumentary elements are within defined limits  Patient Coping  Patient Coping: Open/discussion  Accompanied by  Accompanied by: Family Member    Past  History:    Past Medical History:   Diagnosis Date     Arthritis     shoulder per H & P     GERD (gastroesophageal reflux disease)      H/O pyloric stenosis     as an infant per H & P      Inguinal hernia     per H & P      Lazy eye     per H & P      Non Hodgkin's lymphoma (H)      Prostate CA (H)      Sleep apnea     CPAP        Physical Exam:    Recent Vitals 6/28/2019   Weight 243 lbs 11 oz   BSA (m2) 2.32 m2   /68   Pulse 72   Temp 98.5   Temp src 1   SpO2 98   Some recent data might be hidden     General: patient appears stated age of 53 y.o.. Nontoxic and in no distress.   HEENT: Head: atraumatic, normocephalic. Sclerae anicteric.  Chest:  Normal respiratory effort.  Clear to auscultation bilaterally.  Cardiac: No edema.  Regular rate and rhythm.  No murmur.  Abdomen: abdomen is non-distended  Extremities: normal tone and muscle bulk.   Skin: no lesions or rash.   CNS: alert and oriented. Grossly non-focal.   Psychiatric: normal mood and affect.     Lab Results:    Recent Results (from the past 168 hour(s))   POCT Glucose   Result Value Ref Range    Glucose 127 70 - 139 mg/dL   Comprehensive Metabolic Panel   Result Value Ref Range    Sodium 139 136 - 145 mmol/L    Potassium 4.0 3.5 - 5.0 mmol/L    Chloride 105 98 - 107 mmol/L    CO2 26 22 - 31 mmol/L    Anion Gap, Calculation 8 5 - 18 mmol/L    Glucose 174 (H) 70 - 125 mg/dL    BUN 15 8 - 22 mg/dL    Creatinine 0.98 0.70 - 1.30 mg/dL    GFR MDRD Af Amer >60 >60 mL/min/1.73m2    GFR MDRD Non Af Amer >60 >60 mL/min/1.73m2    Bilirubin, Total 0.5 0.0 - 1.0 mg/dL    Calcium 9.2 8.5 - 10.5 mg/dL    Protein, Total 6.2 6.0 - 8.0 g/dL    Albumin 3.8 3.5 - 5.0 g/dL    Alkaline Phosphatase 57 45 - 120 U/L    AST 19 0 - 40 U/L    ALT 27 0 - 45 U/L   HM1 (CBC with Diff)   Result Value Ref Range    WBC 4.9 4.0 - 11.0 thou/uL    RBC 3.44 (L) 4.40 - 6.20 mill/uL    Hemoglobin 10.5 (L) 14.0 - 18.0 g/dL    Hematocrit 33.0 (L) 40.0 - 54.0 %    MCV 96 80 - 100 fL    MCH  30.5 27.0 - 34.0 pg    MCHC 31.8 (L) 32.0 - 36.0 g/dL    RDW 18.0 (H) 11.0 - 14.5 %    Platelets 167 140 - 440 thou/uL    MPV 9.1 8.5 - 12.5 fL    Neutrophils % 80 (H) 50 - 70 %    Lymphocytes % 7 (L) 20 - 40 %    Monocytes % 12 (H) 2 - 10 %    Eosinophils % 1 0 - 6 %    Basophils % 1 0 - 2 %    Neutrophils Absolute 3.8 2.0 - 7.7 thou/uL    Lymphocytes Absolute 0.4 (L) 0.8 - 4.4 thou/uL    Monocytes Absolute 0.6 0.0 - 0.9 thou/uL    Eosinophils Absolute 0.1 0.0 - 0.4 thou/uL    Basophils Absolute 0.0 0.0 - 0.2 thou/uL     Imaging:    PET CT scan images were reviewed.  Show complete remission.  No evidence of lymphoma.    Nm Pet Ct Skull To Mid Thigh    Result Date: 6/25/2019  EXAM: NM PET CT SKULL TO MID THIGH LOCATION: [facility] DATE/TIME: 6/24/2019 3:13 PM INDICATION: Lymphoma, non-Hodgkin follicular, restaging. Involvement of lymph nodes of multiple sites. Interval chemotherapy. Subsequent treatment strategy. COMPARISON: PET/CT 01/25/2019. CT chest abdomen pelvis 04/08/2019 and baseline PET/CT 02/13/2017 reviewed. TECHNIQUE: Serum glucose level 127 mg/dL. One hour post intravenous administration of 10.6 mCi F-18 FDG, PET imaging was performed from the skull base to the mid thighs utilizing attenuation correction with concurrent axial CT and PET/CT image fusion. Dose reduction techniques were used. FINDINGS: Since 01/25/2019, prior FDG avid adenopathy involving sites below the diaphragm in the iliac and inguinal regions has substantially decreased in size and associated FDG activity has decreased to a level less than background blood pool, [place] 2. A representative right inguinal node demonstrates decreased now only minimal uptake (SUVmax 2.0, previously 11.2). Stable moderate central mesenteric and retroperitoneal stranding with underlying irregularity and low-level activity consistent with prior treated lymphoma. No new FDG avid adenopathy. No FDG avid adenopathy above the diaphragm. Normal size  liver and spleen with normal uptake. No suspicious focal skeletal uptake Right IJ Port-A-Cath with tip near the SVC/RA junction. Hypoattenuation of blood pool relative to myocardium suggesting anemia. Minimal linear atelectasis and/or scarring in the lungs. Mild diffuse hepatic steatosis. Small bilateral renal cysts. Mild atherosclerotic calcifications. Small fat-containing umbilical hernia. Prostatectomy. Pelvic bone photopenia from prior radiation. Mild scattered degenerative changes in the spine.     CONCLUSION: Complete favorable treatment response. No evidence of active lymphoma.      Signed by: Rob Crooks MD

## 2021-05-30 NOTE — PROGRESS NOTES
Patient is here for labs and provider for  Follicular lymphoma grade i, lymph nodes of multiple sites.

## 2021-05-30 NOTE — PROGRESS NOTES
[facility] Hematology and Oncology Progress Note    Patient: Juvenal Blake  MRN: [id number]  Date of Service: May 31, 2019      Assessment and Plan:    1.  Follicular lymphoma: Generally Juvenal is doing okay with chemotherapy.  He will start cycle 3 in a few days.  We will get a PET scan prior to cycle 4.  He has no clinical evidence of progression.  Blood counts are okay today.  No neutropenia.  Continue on full dosing.    2.  Prostate cancer: He finished radiation in early May.  His side effects have resolved.     ECOG Performance   ECOG Performance Status: 1    Distress Assessment  Distress Assessment Score: 3(visit today)    Pain  Currently in Pain: Yes  Pain Score (Initial OR Reassessment): 4  Location: joints, HA    Diagnosis:    1.  Follicular lymphoma: Diagnosed in April, 2010 . Stage IV diagnosis with bone marrow involvement.  Initial disease involved the cervical, supraclavicular, axillary, mesenteric mass, retroperitoneal nodes, and possibly the manubrium.  Relapse noted on imaging in December 2016.    Second relapse on imaging in January 2019.    2.  Prostate cancer:  Pathologic stage T3b prostate cancer.  Positive margins, multiple, and surgery.  3 lymph nodes were negative.  High risk for recurrent disease.  He is being followed at [place] [facility].    Treatment:    Lymphoma:  He had 6 cycles of bendamustine and Rituxan completed in October, 2010.  He had 2 years of maintenance Rituxan therapy finishing in September, 2012.  Second course of bendamustine plus rituximab started February 20, 2017.  Cycle 6 was completed on July 11, 2017.    He was then on maintenance rituximab.  Last dose was given January 2, 2019.  Obinutuzumab plus CHOP started April 23, 2019.    Prostate: Initial radical prostatectomy.  November 15, 2017.  Prostatic adenocarcinoma [place] 4+3 = 7 with tertiary pattern 5.  Tumor involves 45% of total surface area.  Positive for extensive extraprostatic extension.  Positive for  bilateral seminal vesicle invasion.  Multiple margins positive.  Lymphovascular invasion not identified.  Lymph nodes negative.  3 were examined.  Completed radiation at [place] [facility] early May 2018    Interim History:    Juvenal returns today for follow-up visit.  Started on chemotherapy about a month ago after being found to have some progressive disease in January.  He has had 2 cycles.  Having some decreased appetite and nausea.  Also some constipation.  Some mild joint pain as well.  No fevers or night sweats.  No new areas of bony pain.    Review of Systems:    Constitutional  Constitutional (WDL): Exceptions to WDL  Fatigue: Fatigue relieved by rest  Fever: None  Chills: None  Weight Gain: None  Weight Loss: to <10% from baseline, intervention not indicated(3# 5/20/19)  Neurosensory  Neurosensory (WDL): All neurosensory elements are within defined limits  Cardiovascular  Cardiovascular (WDL): Exceptions to WDL  Palpitations: None  Edema: Yes(bilat LE)  Phlebitis: None  Superficial thrombophlebitis: None  Pulmonary  Respiratory (WDL): Exceptions to WDL  Cough: Mild symptoms, nonprescription intervention indicated(dry)  Dyspnea: Shortness of breath with moderate exertion  Hypoxia: None  Gastrointestinal  Gastrointestinal (WDL): Exceptions to WDL  Anorexia: Loss of appetite without alteration in eating habits  Constipation: Occasional or intermittent symptoms, occasional use of stool softeners, laxatives, dietary modification, or enema(Last BM 5/31/19)  Diarrhea: None  Dysphagia: None  Esophagitis: Asymptomatic, clinical or diagnostic observations only, intervention not indicated  Nausea: Loss of appetite without alteration in eating habits  Pharyngitis: None  Vomiting: None  Dysgeusia: Altered taste but no change in diet  Dry Mouth: None  Genitourinary  Genitourinary (WDL): All genitourinary elements are within defined limits  Integumentary  Integumentary (WDL): All integumentary elements are within defined  limits  Patient Coping  Patient Coping: Open/discussion  Accompanied by  Accompanied by: Alone    Past History:    Past Medical History:   Diagnosis Date     Arthritis     shoulder per H & P     GERD (gastroesophageal reflux disease)      H/O pyloric stenosis     as an infant per H & P      Inguinal hernia     per H & P      Lazy eye     per H & P      Non Hodgkin's lymphoma (H)      Prostate CA (H)      Sleep apnea     CPAP        Physical Exam:    Recent Vitals 6/28/2019   Weight 243 lbs 11 oz   BSA (m2) 2.32 m2   /68   Pulse 72   Temp 98.5   Temp src 1   SpO2 98   Some recent data might be hidden     General: patient appears stated age of 53 y.o.. Nontoxic and in no distress.   HEENT: Head: atraumatic, normocephalic. Sclerae anicteric.  Chest:  Normal respiratory effort.    Cardiac: No edema.  Abdomen: abdomen is soft, non-distended  Extremities: normal tone and muscle bulk.   Skin: no lesions or rash.   CNS: alert and oriented. Grossly non-focal.   Psychiatric: normal mood and affect.     Lab Results:    Results for TARA BERTRAND (MRN [id number]) as of 6/30/2019 19:58   Ref. Range 5/31/2019 14:22   Sodium Latest Ref Range: 136 - 145 mmol/L 141   Potassium Latest Ref Range: 3.5 - 5.0 mmol/L 3.9   Chloride Latest Ref Range: 98 - 107 mmol/L 107   CO2 Latest Ref Range: 22 - 31 mmol/L 25   Anion Gap, Calculation Latest Ref Range: 5 - 18 mmol/L 9   BUN Latest Ref Range: 8 - 22 mg/dL 16   Creatinine Latest Ref Range: 0.70 - 1.30 mg/dL 1.21   GFR MDRD Af Amer Latest Ref Range: >60 mL/min/1.73m2 >60   GFR MDRD Non Af Amer Latest Ref Range: >60 mL/min/1.73m2 >60   Calcium Latest Ref Range: 8.5 - 10.5 mg/dL 9.5   AST Latest Ref Range: 0 - 40 U/L 59 (H)   ALT Latest Ref Range: 0 - 45 U/L 76 (H)   ALBUMIN Latest Ref Range: 3.5 - 5.0 g/dL 3.7   Protein, Total Latest Ref Range: 6.0 - 8.0 g/dL 7.3   Alkaline Phosphatase Latest Ref Range: 45 - 120 U/L 68   Bilirubin, Total Latest Ref Range: 0.0 - 1.0 mg/dL 0.3   Glucose  Latest Ref Range: 70 - 125 mg/dL 110   WBC Latest Ref Range: 4.0 - 11.0 thou/uL 6.6   RBC Latest Ref Range: 4.40 - 6.20 mill/uL 3.40 (L)   Hemoglobin Latest Ref Range: 14.0 - 18.0 g/dL 10.4 (L)   Hematocrit Latest Ref Range: 40.0 - 54.0 % 31.6 (L)   MCV Latest Ref Range: 80 - 100 fL 93   MCH Latest Ref Range: 27.0 - 34.0 pg 30.6   MCHC Latest Ref Range: 32.0 - 36.0 g/dL 32.9   RDW Latest Ref Range: 11.0 - 14.5 % 17.3 (H)   Platelets Latest Ref Range: 140 - 440 thou/uL 193   MPV Latest Ref Range: 8.5 - 12.5 fL 9.7     Imaging:    No new imaging.      Signed by: Rob Crooks MD

## 2021-05-30 NOTE — PROGRESS NOTES
Patient arrived ambulatory this am for Day 1 Cycle 5 O-CHOP treatment. Labs reviewed from yesterday and within parameters to receive treatment as planned.  Port accessed with blood return confirmed. Patient reports is taking prednisone as prescribed. Pre-medications given as prescribed. Patient independent with ambulation to bathroom. Patient ate lunch/taking in oral fluids. Patient tolerated chemotherapy as prescribed with no complaints/signs of adverse reaction. Blood return confirmed at port site every 3 minutes during doxorubicin infusion with NaCl infusing as flush.    Report given to Irma PADILLA at 1351.

## 2021-05-30 NOTE — PROGRESS NOTES
[facility] Hematology and Oncology Progress Note    Patient: Juvenal Blake  MRN: [id number]  Date of Service: 07/18/19          Reason for Visit    Chief Complaint   Patient presents with     HE Cancer     Follicular lymphoma grade i, lymph nodes of multiple sites        Assessment and Plan    1.  Follicular lymphoma, low-grade.  Patient most recently has had progressive disease on maintenance Rituxan.  His progression was found in December and January.  We we will continue obinutuzumab with CHOP chemo.  He will get cycle 5 tomorrow.  This is continued at full dose.  He has had a complete response on PET scan after 3 cycles.  We will complete a total of 6 cycles.  After the 6 cycle we will get a PET scan.  Patient then may go on to maintenance obinutuzumab.  Dr. Crooks will decide at that point    2. Prostate cancer: s/p surgery and radiation, which finished around May 2018. PSA was less than 0.1 in March and then in May it went up to 0.2 and then was checked again 2 weeks later and was 0.1.  This is a little concerning so we will keep a close eye on his PSA I will check that the next time he comes    3. Recurrent sinus infections: Low IgA, IgG, IgM levels.  Dr. Tello from ENT suggested he get a couple of doses of IVIG.  He did get 2 doses of IVIG on May 20 in May 28.  He still continues to have some sinus infection and allergies.  states that he feels like it might be a little bit better.  We will repeat IgG levels    4.  Some finger cramping: This is likely from the chemotherapy.  I encouraged him to try to drink more fluids.  We will check a magnesium level      ECOG Performance   ECOG Performance Status: 1    Distress Assessment  Distress Assessment Score: 3(visit related)    Pain  Currently in Pain: Yes  Pain Score (Initial OR Reassessment): 4  Location: shoulder:       Problem List    1. Follicular lymphoma grade i, lymph nodes of multiple sites (H)  [facility] RED    CC OFFICE VISIT LONG    Infusion Appointment    2. Cramp of limb  Magnesium   3. Encounter for chemotherapy management  PSA, Diagnostic (Prostatic-Specific Antigen)    CC OFFICE VISIT LONG    Infusion Appointment   4. Prostate cancer (H)  PSA, Diagnostic (Prostatic-Specific Antigen)      ______________________________________________________________________________    History of Present Illness    Diagnosis:     1. Follicular lymphoma: Diagnosed in April, 2010 . Stage IV diagnosis with bone marrow involvement. Initial disease involved the cervical, supraclavicular, axillary, mesenteric mass, retroperitoneal nodes, and possibly the manubrium.    2.  Prostate cancer:  Pathologic stage T3b prostate cancer.  Positive margins, multiple, and surgery.  3 lymph nodes were negative.  High risk for recurrent disease    Treatment:   Now started on Obinutuzumab and CHOP. Today is cycle 5 progression on maintenance rituxan.     Lymphoma:  He had 6 cycles of bendamustine and Rituxan completed in October, 2010.  He had 2 years of maintenance Rituxan therapy finishing in September, 2012.  Second course of bendamustine plus rituximab started February 20, 2017.  Cycle 6 was completed on July 11, 2017.    He is now on maintenance rituximab.     Prostate: Initial radical prostatectomy.  November 15, 2017.  Prostatic adenocarcinoma [place] 4+3 = 7 with tertiary pattern 5.  Tumor involves 45% of total surface area.  Positive for extensive extraprostatic extension.  Positive for bilateral seminal vesicle invasion.  Multiple margins positive.  Lymphovascular invasion not identified.  Lymph nodes negative.  3 were examined.  He received adjuvant radiation and finished around May 2018.    Interim History:  Juvenal returns to the clinic today to continue on chemo.  States that is going okay.  He does have some finger cramping.  He does have some worsening fatigue and weakness.  He does continue to work.  Patient is anxious about the PSA going up a little bit in May.  Was happy to see his  PET scan results after 3 cycles showed a complete response.    Pain Status  Currently in Pain: Yes    Review of Systems  Constitutional  Constitutional (WDL): Exceptions to WDL  Fatigue: Concerns(relieved with rest)  Fever: No Concerns  Chills: No Concerns  Weight Gain: No Concerns  Weight Loss: No Concerns  Hot flashes/Night Sweats: No Concerns  Neurosensory  Neurosensory (WDL): Exceptions to WDL  Peripheral Motor Neuropathy: Concerns  Ataxia: No Concerns  Peripheral Sensory Neuropathy: No Concerns  Confusion: No Concerns  Dizziness: No Concerns  Syncope: No Concerns  Eye   Eye Disorder (WDL): All eye disorder elements are within defined limits  Blurred Vision: No Concerns  Dry Eye: No Concerns  Eye Pain: No Concerns  Watering Eyes: No Concerns  Ear  Ear Disorder (WDL): All ear disorder elements are within defined limits  Ear Pain: No Concerns  Tinnitus: No Concerns  Cardiovascular  Cardiovascular (WDL): All cardiovascular elements are within defined limits  Palpitations: No Concerns  Edema: No Concerns  SVT, DVT/PE: No Concerns  Chest Pain - Cardiac: No Concerns  Pulmonary  Respiratory (WDL): Exceptions to WDL  Cough: Concerns  Dyspnea: Concerns  Hypoxia: No Concerns  Gastrointestinal  Gastrointestinal (WDL): All gastrointestinal elements are within defined limits  Anorexia: No Concerns  Nausea: No Concerns  Vomiting: No Concerns  Dehydration: No Concerns  Dysgeusia: No Concerns  Dysphagia: No Concerns  Mucositis Oral: No Concerns  Esophagitis: No Concerns  Constipation: No Concerns  Diarrhea: No Concerns  Pharyngitis: No Concerns  Dry Mouth: No Concerns  Genitourinary  Genitourinary (WDL): All genitourinary elements are within defined limits  Urinary Frequency: No Concerns  Urinary Retention: No Concerns  Urinary Tract Pain: No Concerns  Lymphatic  Lymph (WDL): All lymph disorder elements are within defined limits  Lymphedema: No Concerns  Lymph Node Discomfort: No Concerns  Musculoskeletal and Connective  "Tissue  Musculoskeletal and Connetive Tissue Disorders (WDL): Exceptions to WDL  Arthralgia: No Concerns  Bone Pain: No Concerns  Muscle Weakness : No Concerns  Myalgia: Concerns  Integumentary  Integumentary (WDL): All integumentary elements are within defined limits  Alopecia: No Concerns  Rash Maculo-Papular: No Concerns  Pruritus: No Concerns  Urticaria: No Concerns  Palmar-Plantar Erythrodysesthesia Syndrome: No Concerns  Flushing: No Concerns  Patient Coping  Patient Coping: Accepting;Open/discussion  Accompanied by  Accompanied by: Family Member  Oral Chemo Adherence         Past History  Past Medical History:   Diagnosis Date     Arthritis     shoulder per H & P     GERD (gastroesophageal reflux disease)      H/O pyloric stenosis     as an infant per H & P      Inguinal hernia     per H & P      Lazy eye     per H & P      Non Hodgkin's lymphoma (H)      Prostate CA (H)      Sleep apnea     CPAP     PHYSICAL EXAM:  /79   Pulse 64   Temp 98.1  F (36.7  C) (Oral)   Ht 5' 9\" (1.753 m)   Wt (!) 244 lb 14.4 oz (111.1 kg)   SpO2 97%   BMI 36.17 kg/m    GENERAL: no acute distress. Cooperative in conversation. Here with wife  HEENT: pupils are equal, round and reactive. Oromucosa is clean and intact. No ulcerations or mucositis noted. No bleeding noted.  RESP: lungs are clear bilaterally per auscultation. Regular respiratory rate. No wheezes or rhonchi.  CV: Regular, rate and rhythm. No murmurs.  ABD: soft, nontender. Positive bowel sounds. No organomegaly.   MUSCULOSKELETAL: No lower extremity swelling.   NEURO: non focal. Alert and oriented x3.   PSYCH: within normal limits. No depression or anxiety.  SKIN: warm dry intact, port is CDI.  Has bug bites on his legs and he has picked at them so there are some excoriations and open sores.  Bleeding or drainage  LYMPH: no cervical, supraclavicular lymphadenopathy          Lab Results    Recent Results (from the past 168 hour(s))   Comprehensive Metabolic " Panel   Result Value Ref Range    Sodium 143 136 - 145 mmol/L    Potassium 3.9 3.5 - 5.0 mmol/L    Chloride 108 (H) 98 - 107 mmol/L    CO2 27 22 - 31 mmol/L    Anion Gap, Calculation 8 5 - 18 mmol/L    Glucose 106 70 - 125 mg/dL    BUN 15 8 - 22 mg/dL    Creatinine 0.98 0.70 - 1.30 mg/dL    GFR MDRD Af Amer >60 >60 mL/min/1.73m2    GFR MDRD Non Af Amer >60 >60 mL/min/1.73m2    Bilirubin, Total 0.4 0.0 - 1.0 mg/dL    Calcium 9.4 8.5 - 10.5 mg/dL    Protein, Total 6.1 6.0 - 8.0 g/dL    Albumin 3.8 3.5 - 5.0 g/dL    Alkaline Phosphatase 57 45 - 120 U/L    AST 22 0 - 40 U/L    ALT 30 0 - 45 U/L   HM1 (CBC with Diff)   Result Value Ref Range    WBC 6.2 4.0 - 11.0 thou/uL    RBC 3.15 (L) 4.40 - 6.20 mill/uL    Hemoglobin 10.3 (L) 14.0 - 18.0 g/dL    Hematocrit 31.2 (L) 40.0 - 54.0 %    MCV 99 80 - 100 fL    MCH 32.7 27.0 - 34.0 pg    MCHC 33.0 32.0 - 36.0 g/dL    RDW 17.4 (H) 11.0 - 14.5 %    Platelets 200 140 - 440 thou/uL    MPV 9.3 8.5 - 12.5 fL       Imaging    Nm Pet Ct Skull To Mid Thigh    Result Date: 6/25/2019  EXAM: NM PET CT SKULL TO MID THIGH LOCATION: [facility] DATE/TIME: 6/24/2019 3:13 PM INDICATION: Lymphoma, non-Hodgkin follicular, restaging. Involvement of lymph nodes of multiple sites. Interval chemotherapy. Subsequent treatment strategy. COMPARISON: PET/CT 01/25/2019. CT chest abdomen pelvis 04/08/2019 and baseline PET/CT 02/13/2017 reviewed. TECHNIQUE: Serum glucose level 127 mg/dL. One hour post intravenous administration of 10.6 mCi F-18 FDG, PET imaging was performed from the skull base to the mid thighs utilizing attenuation correction with concurrent axial CT and PET/CT image fusion. Dose reduction techniques were used. FINDINGS: Since 01/25/2019, prior FDG avid adenopathy involving sites below the diaphragm in the iliac and inguinal regions has substantially decreased in size and associated FDG activity has decreased to a level less than background blood pool, Ene 2. A  representative right inguinal node demonstrates decreased now only minimal uptake (SUVmax 2.0, previously 11.2). Stable moderate central mesenteric and retroperitoneal stranding with underlying irregularity and low-level activity consistent with prior treated lymphoma. No new FDG avid adenopathy. No FDG avid adenopathy above the diaphragm. Normal size liver and spleen with normal uptake. No suspicious focal skeletal uptake Right IJ Port-A-Cath with tip near the SVC/RA junction. Hypoattenuation of blood pool relative to myocardium suggesting anemia. Minimal linear atelectasis and/or scarring in the lungs. Mild diffuse hepatic steatosis. Small bilateral renal cysts. Mild atherosclerotic calcifications. Small fat-containing umbilical hernia. Prostatectomy. Pelvic bone photopenia from prior radiation. Mild scattered degenerative changes in the spine.     CONCLUSION: Complete favorable treatment response. No evidence of active lymphoma.          Signed by: Madhavi Kurtz, CNP

## 2021-05-30 NOTE — PROGRESS NOTES
Neulasta auto injector applied to pt. Pt left infusion clinic via ambulatory and will RTC as sched.

## 2021-05-30 NOTE — TELEPHONE ENCOUNTER
Call back from pharmacy at the [facility]. They have found 100mg tablets available but patient is prescribed 150mg twice a day. Pharmacy is going to contact the on call for [facility] BMT service to get some direction about how to cover the patient's dose over the weekend.  Karey Ya RN  [facility]    
Patient took last dose this morning. He has no more left! Is sup[posed to take it twice a day. I called the Discharge Pharmacy at the  of [facility] and spoke with pharmacy. They do not have this med available but they are going to contact the manager from the [facility] which is closed until Tuesday and see if there is any way to get this medication today. I gave them the patient's contact phone number at home and my phone number. They will try to get enough med to get the patient through the weekend.  Karey Ya RN  [facility]    
solids

## 2021-05-31 VITALS — WEIGHT: 244.2 LBS | BODY MASS INDEX: 37.01 KG/M2 | HEIGHT: 68 IN

## 2021-05-31 VITALS — HEIGHT: 69 IN | BODY MASS INDEX: 36.82 KG/M2 | WEIGHT: 248.6 LBS

## 2021-05-31 VITALS — HEIGHT: 69 IN | WEIGHT: 243 LBS | BODY MASS INDEX: 35.99 KG/M2

## 2021-05-31 VITALS — BODY MASS INDEX: 37.28 KG/M2 | HEIGHT: 68 IN | WEIGHT: 246 LBS

## 2021-05-31 VITALS — BODY MASS INDEX: 39.02 KG/M2 | WEIGHT: 256.6 LBS

## 2021-05-31 VITALS — BODY MASS INDEX: 36.42 KG/M2 | WEIGHT: 239.5 LBS

## 2021-05-31 VITALS — WEIGHT: 244.6 LBS | BODY MASS INDEX: 36.12 KG/M2

## 2021-05-31 NOTE — PROGRESS NOTES
Juvenal Blake, 53 y.o., male, arrived to Chemo Infusion at 1500 for lab draw/port flush. Port easily accessed, labs drawn, and flushed with 20ml Normal Saline and 600 units Heparin. Port remained accessed for treatment tomorrow. Juvenal Blake discharged to [facility] at 1515 ambulatory and stable.

## 2021-05-31 NOTE — PROGRESS NOTES
PT here ambulatory with wife for txt. Pt saw MD yesterday and labs drawn yesterday. Lab results approved for txt. txt reviewed with pt. Port accessed yesterday flushed with ns and has brisk blood return. Txt administered as ordered and pt tolerated without any problems. Positive blood return before/during and after adriamycin ivp. Txt completed and port flushed with ns then heparin/deaccessed with 2x2 to site. Follow up reviewed. Neulasta injector placed on pt and reviewed it will start infusing around 445pm tomorrow and be complete around 530. PT dc'd steady gait.

## 2021-05-31 NOTE — PROGRESS NOTES
[facility] Hematology and Oncology Progress Note    Patient: Juvenal Blake  MRN: [id number]  Date of Service: 08/15/19          Reason for Visit    Chief Complaint   Patient presents with     HE Cancer     Follicular lymphoma grade       Assessment and Plan    1.  Follicular lymphoma, low-grade.  Patient most recently has had progressive disease on maintenance Rituxan.  His progression was found in December and January.  We we will continue obinutuzumab with CHOP chemo.  He will get cycle 5 tomorrow.  This is continued at full dose.  He has had a complete response on PET scan after 3 cycles.  He will get cycle 6 today.   After the 6 cycle we will get a PET scan.  Patient then may go on to maintenance obinutuzumab.  Dr. Crooks will decide at that point    2. Prostate cancer: s/p surgery and radiation, which finished around May 2018. PSA was less than 0.1 in March and then in May it went up to 0.2 and then was checked again 2 weeks later and was 0.1.  This is a little concerning so we will keep a close eye on his PSA. Pending from today    3. Recurrent sinus infections: Low IgA, IgG, IgM levels.  Dr. Tello from ENT suggested he get a couple of doses of IVIG.  He did get 2 doses of IVIG on May 20 in May 28.  He still continues to have some sinus infection and allergies. We will repeat IgG levels      ECOG Performance   ECOG Performance Status: 1    Distress Assessment  Distress Assessment Score: No distress    Pain  Currently in Pain: Yes  Pain Score (Initial OR Reassessment): 4  Location: Bilat shoulders.:       Problem List    1. Follicular lymphoma grade i, lymph nodes of multiple sites (H)  NM PET CT Skull to Mid Thigh      ______________________________________________________________________________    History of Present Illness    Diagnosis:     1. Follicular lymphoma: Diagnosed in April, 2010 . Stage IV diagnosis with bone marrow involvement. Initial disease involved the cervical, supraclavicular, axillary,  mesenteric mass, retroperitoneal nodes, and possibly the manubrium.    2.  Prostate cancer:  Pathologic stage T3b prostate cancer.  Positive margins, multiple, and surgery.  3 lymph nodes were negative.  High risk for recurrent disease    Treatment:   Now started on Obinutuzumab and CHOP. Today is cycle 6. progression on maintenance rituxan.     Lymphoma:  He had 6 cycles of bendamustine and Rituxan completed in October, 2010.  He had 2 years of maintenance Rituxan therapy finishing in September, 2012.  Second course of bendamustine plus rituximab started February 20, 2017.  Cycle 6 was completed on July 11, 2017.         Prostate: Initial radical prostatectomy.  November 15, 2017.  Prostatic adenocarcinoma [place] 4+3 = 7 with tertiary pattern 5.  Tumor involves 45% of total surface area.  Positive for extensive extraprostatic extension.  Positive for bilateral seminal vesicle invasion.  Multiple margins positive.  Lymphovascular invasion not identified.  Lymph nodes negative.  3 were examined.  He received adjuvant radiation and finished around May 2018.    Interim History:  Juvenal returns to the clinic today to continue on chemo.  States that is going okay.  He does have some finger cramping.  He does have some worsening fatigue and weakness.  He does continue to work.  Patient is anxious about the PSA going up a little bit in May.  Was happy to see his PET scan results after 3 cycles showed a complete response.    Pain Status  Currently in Pain: Yes    Review of Systems  Constitutional  Constitutional (WDL): Exceptions to WDL  Fatigue: Fatigue not relieved by rest - Limiting instrumental ADL  Neurosensory  Neurosensory (WDL): Exceptions to WDL  Ataxia: Asymptomatic, clinical or diagnostic observations only, intervention not indicated(Occ unsteady. )  Peripheral Sensory Neuropathy: Asymptomatic, loss of deep tendon reflexes or paresthesia(Hands; numbness. And cramping.)  Eye   Eye Disorder (WDL): Exceptions to  WDL  Blurred Vision: Intervention not indicated  Ear  Ear Disorder (WDL): All ear disorder elements are within defined limits  Cardiovascular  Cardiovascular (WDL): Exceptions to WDL  Palpitations: Definition: A disorder characterized by inflammation of the muscle tissue of the heart.  Edema: Yes(Ankles. )  Edema Limbs: 5 - 10% inter-limb discrepancy in volume or circumference at point of greatest visible difference, swelling or obscuration of anatomic architecture on close inspection  Pulmonary  Respiratory (WDL): Exceptions to WDL  Cough: Mild symptoms, nonprescription intervention indicated  Dyspnea: Shortness of breath with moderate exertion  Gastrointestinal  Gastrointestinal (WDL): Exceptions to WDL  Nausea: Loss of appetite without alteration in eating habits  Genitourinary  Genitourinary (WDL): Exceptions to WDL  Urinary Frequency: Present  Lymphatic  Lymph (WDL): All lymph disorder elements are within defined limits  Musculoskeletal and Connective Tissue  Musculoskeletal and Connetive Tissue Disorders (WDL): Exceptions to WDL  Arthralgia: Moderate pain, limiting instrumental ADL  Integumentary  Integumentary (WDL): All integumentary elements are within defined limits  Patient Coping  Patient Coping: Accepting  Distress Assessment  Distress Assessment Score: No distress  Accompanied by  Accompanied by: Family Member  Oral Chemo Adherence         Past History  Past Medical History:   Diagnosis Date     Arthritis     shoulder per H & P     GERD (gastroesophageal reflux disease)      H/O pyloric stenosis     as an infant per H & P      Inguinal hernia     per H & P      Lazy eye     per H & P      Non Hodgkin's lymphoma (H)      Prostate CA (H)      Sleep apnea     CPAP     PHYSICAL EXAM:  /69   Pulse 70   Temp 98.4  F (36.9  C) (Oral)   Wt (!) 242 lb 11.2 oz (110.1 kg)   SpO2 95%   BMI 35.84 kg/m    GENERAL: no acute distress. Cooperative in conversation. Here with wife  HEENT: pupils are equal,  round and reactive. Oromucosa is clean and intact. No ulcerations or mucositis noted. No bleeding noted.  RESP: lungs are clear bilaterally per auscultation. Regular respiratory rate. No wheezes or rhonchi.  CV: Regular, rate and rhythm. No murmurs.  ABD: soft, nontender. Positive bowel sounds. No organomegaly.   MUSCULOSKELETAL: No lower extremity swelling.   NEURO: non focal. Alert and oriented x3.   PSYCH: within normal limits. No depression or anxiety.  SKIN: warm dry intact, port is CDI.    LYMPH: no cervical, supraclavicular lymphadenopathy          Lab Results    Recent Results (from the past 168 hour(s))   Comprehensive Metabolic Panel   Result Value Ref Range    Sodium 141 136 - 145 mmol/L    Potassium 4.1 3.5 - 5.0 mmol/L    Chloride 106 98 - 107 mmol/L    CO2 27 22 - 31 mmol/L    Anion Gap, Calculation 8 5 - 18 mmol/L    Glucose 98 70 - 125 mg/dL    BUN 15 8 - 22 mg/dL    Creatinine 0.99 0.70 - 1.30 mg/dL    GFR MDRD Af Amer >60 >60 mL/min/1.73m2    GFR MDRD Non Af Amer >60 >60 mL/min/1.73m2    Bilirubin, Total 0.5 0.0 - 1.0 mg/dL    Calcium 9.5 8.5 - 10.5 mg/dL    Protein, Total 6.2 6.0 - 8.0 g/dL    Albumin 4.1 3.5 - 5.0 g/dL    Alkaline Phosphatase 59 45 - 120 U/L    AST 24 0 - 40 U/L    ALT 29 0 - 45 U/L   HM1 (CBC with Diff)   Result Value Ref Range    WBC 6.2 4.0 - 11.0 thou/uL    RBC 3.29 (L) 4.40 - 6.20 mill/uL    Hemoglobin 10.8 (L) 14.0 - 18.0 g/dL    Hematocrit 32.7 (L) 40.0 - 54.0 %    MCV 99 80 - 100 fL    MCH 32.8 27.0 - 34.0 pg    MCHC 33.0 32.0 - 36.0 g/dL    RDW 15.8 (H) 11.0 - 14.5 %    Platelets 161 140 - 440 thou/uL    MPV 9.7 8.5 - 12.5 fL    Neutrophils % 76 (H) 50 - 70 %    Lymphocytes % 7 (L) 20 - 40 %    Monocytes % 11 (H) 2 - 10 %    Eosinophils % 1 0 - 6 %    Basophils % 1 0 - 2 %    Neutrophils Absolute 4.7 2.0 - 7.7 thou/uL    Lymphocytes Absolute 0.5 (L) 0.8 - 4.4 thou/uL    Monocytes Absolute 0.7 0.0 - 0.9 thou/uL    Eosinophils Absolute 0.1 0.0 - 0.4 thou/uL    Basophils  Absolute 0.0 0.0 - 0.2 thou/uL       Imaging    No results found.        Signed by: Madhavi Kurtz, CNP

## 2021-06-01 VITALS — BODY MASS INDEX: 36.89 KG/M2 | WEIGHT: 242.6 LBS

## 2021-06-01 VITALS — BODY MASS INDEX: 39.38 KG/M2 | WEIGHT: 259 LBS

## 2021-06-01 VITALS — BODY MASS INDEX: 37.68 KG/M2 | WEIGHT: 247.8 LBS

## 2021-06-01 VITALS — BODY MASS INDEX: 39.18 KG/M2 | WEIGHT: 257.7 LBS

## 2021-06-01 DIAGNOSIS — Z86.2 PERSONAL HISTORY OF DISEASES OF BLOOD AND BLOOD-FORMING ORGANS: ICD-10-CM

## 2021-06-01 DIAGNOSIS — C82.08 FOLLICULAR LYMPHOMA GRADE I, LYMPH NODES OF MULTIPLE SITES (H): Primary | ICD-10-CM

## 2021-06-01 NOTE — TELEPHONE ENCOUNTER
Call placed to patient to see if his cough has improved per Dr Crooks.  Patient states he still has the cough, he did go see his primary MD who did prescribe an antibiotic.  Pt denied fever or chills.  Pt will contact us if the antibiotics do not resolve his symptoms.  MD updated.

## 2021-06-01 NOTE — PROGRESS NOTES
Patient is here for follow up with provider regarding Follicular lymphoma grade i, lymph nodes of multiple sites.

## 2021-06-02 ENCOUNTER — RECORDS - HEALTHEAST (OUTPATIENT)
Dept: ADMINISTRATIVE | Facility: CLINIC | Age: 56
End: 2021-06-02

## 2021-06-02 VITALS — BODY MASS INDEX: 35.96 KG/M2 | WEIGHT: 243.5 LBS

## 2021-06-02 VITALS — WEIGHT: 244.8 LBS | HEIGHT: 69 IN | BODY MASS INDEX: 36.26 KG/M2

## 2021-06-02 VITALS — WEIGHT: 244.7 LBS | BODY MASS INDEX: 36.14 KG/M2

## 2021-06-02 VITALS — WEIGHT: 246.8 LBS | BODY MASS INDEX: 36.45 KG/M2

## 2021-06-02 VITALS — WEIGHT: 235.5 LBS | BODY MASS INDEX: 35.81 KG/M2

## 2021-06-02 VITALS — BODY MASS INDEX: 36.62 KG/M2 | WEIGHT: 248 LBS

## 2021-06-02 VITALS — BODY MASS INDEX: 36.33 KG/M2 | WEIGHT: 246 LBS

## 2021-06-02 VITALS — WEIGHT: 241 LBS | BODY MASS INDEX: 35.59 KG/M2

## 2021-06-02 VITALS — WEIGHT: 244.8 LBS | BODY MASS INDEX: 37.22 KG/M2

## 2021-06-02 VITALS — HEIGHT: 69 IN | BODY MASS INDEX: 36.88 KG/M2 | WEIGHT: 249 LBS

## 2021-06-02 VITALS — WEIGHT: 255.3 LBS | BODY MASS INDEX: 37.7 KG/M2

## 2021-06-02 VITALS — BODY MASS INDEX: 36.77 KG/M2 | WEIGHT: 249 LBS

## 2021-06-02 DIAGNOSIS — C82.08 FOLLICULAR LYMPHOMA GRADE I, LYMPH NODES OF MULTIPLE SITES (H): Primary | ICD-10-CM

## 2021-06-02 NOTE — PATIENT INSTRUCTIONS - HE
Patient Education     Immune Globulin Solution for injection  What is this medicine?  IMMUNE GLOBULIN (im MUNE GLOB jones solano) helps to prevent or reduce the severity of certain infections in patients who are at risk. This medicine is collected from the pooled blood of many donors. It is used to treat immune system problems, thrombocytopenia, and Kawasaki syndrome.  This medicine may be used for other purposes; ask your health care provider or pharmacist if you have questions.  What should I tell my health care provider before I take this medicine?  They need to know if you have any of these conditions:    diabetes    extremely low or no immune antibodies in the blood    heart disease    history of blood clots    hyperprolinemia    infection in the blood, sepsis    kidney disease    taking medicine that may change kidney function - ask your health care provider about your medicine    an unusual or allergic reaction to human immune globulin, albumin, maltose, sucrose, polysorbate 80, other medicines, foods, dyes, or preservatives    pregnant or trying to get pregnant    breast-feeding  How should I use this medicine?  This medicine is for injection into a muscle or infusion into a vein or skin. It is usually given by a health care professional in a hospital or clinic setting.  In rare cases, some brands of this medicine might be given at home. You will be taught how to give this medicine. Use exactly as directed. Take your medicine at regular intervals. Do not take your medicine more often than directed.  Talk to your pediatrician regarding the use of this medicine in children. Special care may be needed.  Overdosage: If you think you have taken too much of this medicine contact a poison control center or emergency room at once.  NOTE: This medicine is only for you. Do not share this medicine with others.  What if I miss a dose?  It is important not to miss your dose. Call your doctor or health care professional if  you are unable to keep an appointment. If you give yourself the medicine and you miss a dose, take it as soon as you can. If it is almost time for your next dose, take only that dose. Do not take double or extra doses.  What may interact with this medicine?    aspirin and aspirin-like medicines    cisplatin    cyclosporine    medicines for infection like acyclovir, adefovir, amphotericin B, bacitracin, cidofovir, foscarnet, ganciclovir, gentamicin, pentamidine, vancomycin    NSAIDS, medicines for pain and inflammation, like ibuprofen or naproxen    pamidronate    vaccines    zoledronic acid  This list may not describe all possible interactions. Give your health care provider a list of all the medicines, herbs, non-prescription drugs, or dietary supplements you use. Also tell them if you smoke, drink alcohol, or use illegal drugs. Some items may interact with your medicine.  What should I watch for while using this medicine?  Your condition will be monitored carefully while you are receiving this medicine.  This medicine is made from pooled blood donations of many different people. It may be possible to pass an infection in this medicine. However, the donors are screened for infections and all products are tested for HIV and hepatitis. The medicine is treated to kill most or all bacteria and viruses. Talk to your doctor about the risks and benefits of this medicine.  Do not have vaccinations for at least 14 days before, or until at least 3 months after receiving this medicine.  What side effects may I notice from receiving this medicine?  Side effects that you should report to your doctor or health care professional as soon as possible:    allergic reactions like skin rash, itching or hives, swelling of the face, lips, or tongue    breathing problems    chest pain or tightness    fever, chills    headache with nausea, vomiting    neck pain or difficulty moving neck    pain when moving eyes    pain, swelling, warmth  in the leg    problems with balance, talking, walking    sudden weight gain    swelling of the ankles, feet, hands    trouble passing urine or change in the amount of urine  Side effects that usually do not require medical attention (report to your doctor or health care professional if they continue or are bothersome):    dizzy, drowsy    flushing    increased sweating    leg cramps    muscle aches and pains    pain at site where injected  This list may not describe all possible side effects. Call your doctor for medical advice about side effects. You may report side effects to FDA at 3-243-FDA-7150.  Where should I keep my medicine?  Keep out of the reach of children.  This drug is usually given in a hospital or clinic and will not be stored at home.  In rare cases, some brands of this medicine may be given at home. If you are using this medicine at home, you will be instructed on how to store this medicine. Throw away any unused medicine after the expiration date on the label.  NOTE: This sheet is a summary. It may not cover all possible information. If you have questions about this medicine, talk to your doctor, pharmacist, or health care provider.  NOTE:This sheet is a summary. It may not cover all possible information. If you have questions about this medicine, talk to your doctor, pharmacist, or health care provider. Copyright  2015 Gold Standard

## 2021-06-02 NOTE — PROGRESS NOTES
Juvenal arrived A&OX4 ambulatory and stable, confirms he is here for IVIG to treat chronic sinusitis. He has ongoing sinus congestion, chest tightness and cough that is sometimes productive. He denies fevers. States he has lost a couple of pounds but that his appetite is OK. Started new round of Doxycycline.  POC/medication education reviewed, pt stated understanding and agreed to plan. Port accessed w/ ease, excellent blood return and line easily flushed. VS WNL.  Pre meds APAP 650mg p.o. and diphenhydramine 25mg p.o. given.  IVIG 70g infused at titrated rate per protocol;   1150 slight headache- BP increased 163/89- held infusion rate at 3.5mg/kg/min. on re-check BP had returned to 140's as previous and he stated his headache had subsided. Rate increased to max of 4.0mg/kg/min  Juvenal tolerated remaining infusion w/o sxs adverse reaction. Line flushed NS and pt monitored 30min post infusion. VSS   port flushed NS20mL, instilled w heparin 6mL 1:100 and gripper needle dc'd, site covered w gauze and papertape.  Dc education/AVS reviewed, pt stated understanding and that his needs were met today.  1415 Juvenal katz'd A&Ox4 ambulatory and stable, aware of his next infusion 11/4/19

## 2021-06-02 NOTE — PROGRESS NOTES
[facility] Hematology and Oncology Progress Note    Patient: Juvenal Blake  MRN: [id number]  Date of Service: September 13, 2019      Assessment and Plan:    1.  Follicular lymphoma: He completed his last cycle of chemotherapy on August 16, 2019.  This was about a month ago.  Feeling okay and recovering side effects.  He had a PET scan 2 days ago.  We reviewed the images.  There is no evidence of recurrent disease.  I do not think we will proceed with maintenance as this is relapsed disease.    2.  Prostate cancer: He finished radiation in early May.  PSA is stable at 0.1.  Will monitor routinely.  Given its stability I do not think we need to intervene at this time.    ECOG Performance   ECOG Performance Status: 0    Distress Assessment  Distress Assessment Score: 1June 28, 2019    Pain  Currently in Pain: No/denies    Diagnosis:    1.  Follicular lymphoma: Diagnosed in April, 2010 . Stage IV diagnosis with bone marrow involvement.  Initial disease involved the cervical, supraclavicular, axillary, mesenteric mass, retroperitoneal nodes, and possibly the manubrium.  Relapse noted on imaging in December 2016.  Second relapse on imaging January 2019.  Repeat biopsy January 7, 2019 shows recurrent low-grade follicular lymphoma.  Grade 2.    2.  Prostate cancer:  Pathologic stage T3b prostate cancer.  Positive margins, multiple, and surgery.  3 lymph nodes were negative.  High risk for recurrent disease.  He is being followed at [facility].    3.  Hypogammaglobulinemia: He received 2 doses of IVIG in late May.    Treatment:    Lymphoma:    He had 6 cycles of bendamustine and Rituxan completed in October, 2010.  He had 2 years of maintenance Rituxan therapy finishing in September, 2012.  Second course of bendamustine plus rituximab started February 20, 2017.  Cycle 6 was completed on July 11, 2017.  Then had maintenance rituximab.    O-CHOP started at second relapse.  Cycle 1 given April 23, 2019.  Cycle 6 given  August 16, 2019.    Prostate:   Initial radical prostatectomy.  November 15, 2017.  Prostatic adenocarcinoma Maricel 4+3 = 7 with tertiary pattern 5.  Tumor involves 45% of total surface area.  Positive for extensive extraprostatic extension.  Positive for bilateral seminal vesicle invasion.  Multiple margins positive.  Lymphovascular invasion not identified.  Lymph nodes negative.  3 were examined.  Completed radiation at [facility] early May 2018    Interim History:    Juvenal returns today for follow-up visit.  He was last seen about 3 months ago.  Had another course of Augmentin due to productive cough with yellow mucus.  He has no nasal congestion.  Otherwise no new areas of pain.  No fevers or chills.      Review of Systems:    Constitutional  Constitutional (WDL): Exceptions to WDL  Fatigue: Fatigue relieved by rest  Neurosensory  Neurosensory (WDL): Exceptions to WDL  Peripheral Motor Neuropathy: Asymptomatic, clinical or diagnostic observations only, intervention not indicated  Ataxia: Asymptomatic, clinical or diagnostic observations only, intervention not indicated  Peripheral Sensory Neuropathy: Asymptomatic, loss of deep tendon reflexes or paresthesia(cramps in feet and hands)  Cardiovascular  Cardiovascular (WDL): Exceptions to WDL  Edema: Yes  Edema Limbs: 5 - 10% inter-limb discrepancy in volume or circumference at point of greatest visible difference, swelling or obscuration of anatomic architecture on close inspection  Pulmonary  Respiratory (WDL): Exceptions to WDL  Cough: Mild symptoms, nonprescription intervention indicated  Dyspnea: Shortness of breath with moderate exertion  Gastrointestinal  Gastrointestinal (WDL): Exceptions to WDL  Nausea: Loss of appetite without alteration in eating habits  Genitourinary  Genitourinary (WDL): Exceptions to WDL  Urinary Frequency: Present  Integumentary  Integumentary (WDL): All integumentary elements are within defined limits  Patient  Coping  Patient Coping: Accepting  Accompanied by  Accompanied by: Family Member(wife)    Past History:    Past Medical History:   Diagnosis Date     Arthritis     shoulder per H & P     GERD (gastroesophageal reflux disease)      H/O pyloric stenosis     as an infant per H & P      Inguinal hernia     per H & P      Lazy eye     per H & P      Non Hodgkin's lymphoma (H)      Prostate CA (H)      Sleep apnea     CPAP        Physical Exam:    Recent Vitals 9/13/2019   Height -   Weight 241 lbs   BSA (m2) 2.31 m2   /68   Pulse 65   Temp 97.8   Temp src 1   SpO2 99   Some recent data might be hidden     General: patient appears stated age of 54 y.o.. Nontoxic and in no distress.   HEENT: Head: atraumatic, normocephalic. Sclerae anicteric.  Chest:  Normal respiratory effort.   Cardiac: No edema.   Abdomen: abdomen is non-distended  Extremities: normal tone and muscle bulk.   Skin: no lesions or rash.   CNS: alert and oriented. Grossly non-focal.   Psychiatric: normal mood and affect.     Lab Results:    Results for LENINCHELA TARA LUCIEN (MRN [id number]) as of 10/12/2019 16:06   Ref. Range 9/13/2019 14:59   Sodium Latest Ref Range: 136 - 145 mmol/L 138   Potassium Latest Ref Range: 3.5 - 5.0 mmol/L 3.6   Chloride Latest Ref Range: 98 - 107 mmol/L 103   CO2 Latest Ref Range: 22 - 31 mmol/L 26   Anion Gap, Calculation Latest Ref Range: 5 - 18 mmol/L 9   BUN Latest Ref Range: 8 - 22 mg/dL 12   Creatinine Latest Ref Range: 0.70 - 1.30 mg/dL 1.03   GFR MDRD Af Amer Latest Ref Range: >60 mL/min/1.73m2 >60   GFR MDRD Non Af Amer Latest Ref Range: >60 mL/min/1.73m2 >60   Calcium Latest Ref Range: 8.5 - 10.5 mg/dL 9.3   AST Latest Ref Range: 0 - 40 U/L 23   ALT Latest Ref Range: 0 - 45 U/L 28   ALBUMIN Latest Ref Range: 3.5 - 5.0 g/dL 3.9   Protein, Total Latest Ref Range: 6.0 - 8.0 g/dL 6.1   Alkaline Phosphatase Latest Ref Range: 45 - 120 U/L 58   Bilirubin, Total Latest Ref Range: 0.0 - 1.0 mg/dL 0.5   Glucose Latest Ref  Range: 70 - 125 mg/dL 116   LD (LDH) Latest Ref Range: 125 - 220 U/L 314 (H)   WBC Latest Ref Range: 4.0 - 11.0 thou/uL 7.6   RBC Latest Ref Range: 4.40 - 6.20 mill/uL 3.35 (L)   Hemoglobin Latest Ref Range: 14.0 - 18.0 g/dL 11.0 (L)   Hematocrit Latest Ref Range: 40.0 - 54.0 % 33.2 (L)   MCV Latest Ref Range: 80 - 100 fL 99   MCH Latest Ref Range: 27.0 - 34.0 pg 32.8   MCHC Latest Ref Range: 32.0 - 36.0 g/dL 33.1   RDW Latest Ref Range: 11.0 - 14.5 % 15.0 (H)   Platelets Latest Ref Range: 140 - 440 thou/uL 156   MPV Latest Ref Range: 8.5 - 12.5 fL 9.5   Total Neutrophils % Latest Ref Range: 50 - 70 % 88 (H)   Lymphocytes % Latest Ref Range: 20 - 40 % 3 (L)   Monocytes % Latest Ref Range: 2 - 10 % 7   Eosinophils %  Latest Ref Range: 0 - 6 % 1   Basophils % Latest Ref Range: 0 - 2 % 1   Total Neutrophils Absolute Latest Ref Range: 2.0 - 7.7 thou/ul 6.7   Lymphocytes Absolute Latest Ref Range: 0.8 - 4.4 thou/uL 0.2 (L)   Monocytes Absolute Latest Ref Range: 0.0 - 0.9 thou/uL 0.5   Eosinophils Absolute Latest Ref Range: 0.0 - 0.4 thou/uL 0.1   Basophils Absolute Latest Ref Range: 0.0 - 0.2 thou/uL 0.1     Imaging:    PET CT scan images were reviewed.  Show complete remission.  No evidence of lymphoma.    EXAM: NM PET CT SKULL TO MID THIGH  LOCATION: [facility]  DATE/TIME: 9/11/2019 3:18 PM     FINDINGS: Normal physiologic FDG uptake. No FDG avid adenopathy. Normal size liver and spleen with normal uptake. No suspicious focal skeletal uptake.     Stable moderate central mesenteric and retroperitoneal stranding consistent with sites of previously treated lymphoma. Right IJ Port-A-Cath with tip near the SVC/RA junction. Hypoattenuation of blood pool relative to myocardium suggesting anemia. Minimal   linear scarring both lungs. Mild diffuse hepatic steatosis. Small bilateral renal cysts. Small fat-containing umbilical hernia. Prostatectomy. Mild scattered degenerative changes in the spine. Pelvic bone photopenia  from prior radiation therapy. Old   left rib fracture.    CONCLUSION:  No substantial change since 6/24/2019. No evidence of recurrent or active lymphoma.    No results found.      Signed by: Rob Crooks MD

## 2021-06-03 ENCOUNTER — RECORDS - HEALTHEAST (OUTPATIENT)
Dept: ADMINISTRATIVE | Facility: CLINIC | Age: 56
End: 2021-06-03

## 2021-06-03 VITALS
DIASTOLIC BLOOD PRESSURE: 81 MMHG | OXYGEN SATURATION: 96 % | BODY MASS INDEX: 35.66 KG/M2 | SYSTOLIC BLOOD PRESSURE: 133 MMHG | WEIGHT: 238 LBS | HEART RATE: 72 BPM | TEMPERATURE: 98.1 F

## 2021-06-03 VITALS
WEIGHT: 239 LBS | HEART RATE: 62 BPM | TEMPERATURE: 98.3 F | SYSTOLIC BLOOD PRESSURE: 159 MMHG | BODY MASS INDEX: 35.29 KG/M2 | OXYGEN SATURATION: 96 % | DIASTOLIC BLOOD PRESSURE: 88 MMHG | RESPIRATION RATE: 16 BRPM

## 2021-06-03 VITALS
RESPIRATION RATE: 24 BRPM | HEIGHT: 69 IN | BODY MASS INDEX: 35.87 KG/M2 | SYSTOLIC BLOOD PRESSURE: 122 MMHG | WEIGHT: 242.2 LBS | HEART RATE: 76 BPM | DIASTOLIC BLOOD PRESSURE: 74 MMHG | OXYGEN SATURATION: 99 %

## 2021-06-03 VITALS — HEIGHT: 69 IN | BODY MASS INDEX: 36.27 KG/M2 | WEIGHT: 244.9 LBS

## 2021-06-03 VITALS — BODY MASS INDEX: 34.96 KG/M2 | HEIGHT: 69 IN | WEIGHT: 236 LBS

## 2021-06-03 VITALS
DIASTOLIC BLOOD PRESSURE: 68 MMHG | SYSTOLIC BLOOD PRESSURE: 116 MMHG | WEIGHT: 239 LBS | BODY MASS INDEX: 35.4 KG/M2 | HEIGHT: 69 IN

## 2021-06-03 VITALS — WEIGHT: 242.7 LBS | BODY MASS INDEX: 35.84 KG/M2

## 2021-06-03 VITALS
WEIGHT: 239.2 LBS | BODY MASS INDEX: 35.32 KG/M2 | SYSTOLIC BLOOD PRESSURE: 139 MMHG | TEMPERATURE: 98.3 F | DIASTOLIC BLOOD PRESSURE: 73 MMHG | OXYGEN SATURATION: 99 % | HEART RATE: 72 BPM

## 2021-06-03 VITALS — BODY MASS INDEX: 35.99 KG/M2 | WEIGHT: 243.7 LBS

## 2021-06-03 VITALS
WEIGHT: 241 LBS | TEMPERATURE: 97.8 F | HEART RATE: 65 BPM | DIASTOLIC BLOOD PRESSURE: 68 MMHG | BODY MASS INDEX: 35.59 KG/M2 | OXYGEN SATURATION: 99 % | SYSTOLIC BLOOD PRESSURE: 105 MMHG

## 2021-06-03 VITALS
HEIGHT: 69 IN | WEIGHT: 238 LBS | SYSTOLIC BLOOD PRESSURE: 122 MMHG | HEART RATE: 88 BPM | BODY MASS INDEX: 35.25 KG/M2 | DIASTOLIC BLOOD PRESSURE: 80 MMHG

## 2021-06-03 LAB — COPATH REPORT: NORMAL

## 2021-06-03 NOTE — TELEPHONE ENCOUNTER
Central PA team  [phone]  Pool: HE PA MED ([id number])          PA has been initiated.       PA form completed and faxed insurance via Cover My Meds     Key:  [id number]     Medication:  DULERA     Insurance:  [employer]        Response will be received via fax and may take up to 5-10 business days depending on plan

## 2021-06-03 NOTE — TELEPHONE ENCOUNTER
Juvenal's wife Nancy calls in today and asks us to call Juvenal on his cell phone to discuss some concerns he is having.  He states that with his chronic sinusitis and upper respiratory issues, he would like to see a pulmonologist to further discuss.  He has had IVIG along with doxycycline and he reports that is soon as he is done with treatment the symptoms return.  He has a cough where he is continually trying to cough up phlegm that has built up.  He wants to know if Dr. Crooks thinks he should get another prescription of doxycycline.    I have talked with Dr. Crooks regarding the above and he states that he should discuss further refills of doxycycline with whomever has been managing it.  He also is comfortable putting in a referral to pulmonology.  He is not sure that they will be able to do anything else but it is worth getting their opinion.  I called Juvenal back to let him know the above and he verbalized understanding.  I transferred him to the  to assist in scheduling the referral.    Estrella Vargas RN

## 2021-06-03 NOTE — TELEPHONE ENCOUNTER
Prior Authorization Request  Who s requesting:   abiel  Pharmacy Name and Location: [phone] fax  Medication Name: dulera 200-5  Insurance Plan: [employer]  Insurance Member ID Number:  [id number]  Informed patient that prior authorizations can take up to 10 business days for response:   Yes  Okay to leave a detailed message: yes

## 2021-06-03 NOTE — PROGRESS NOTES
Pulmonary Clinic Outpatient Consultation    Assessment and Plan:   54 year old man with history of low-grade follicular lymphoma with progression, currently on CHOP chemotherapy, prostate cancer, GERD, JAZLYN currently using CPAP, chronic sinusitis, presenting for evaluation of chronic coughing at the request of his oncologist. His symptoms seem to improve with IVIg and antibiotics (most recently, doxycycline). His lungs looked normal on CT chest from last April and lungs are clear today. Office spirometry did not show any evidence of obstruction. I am not sure how much his lungs are contributing to his cough but we will do a high-resolution CT scan to make sure he does not have any bronchiectasis or other parenchymal lung disease as a complication of his low immunoglobulin levels. At this point it seems that his cough is most likely related to chronic sinus disease, with possible contribution from GERD and JAZLYN.     Recommendations:  - OK to continute Qvar for now. Rinse/gargle after use. Not sure he actually needs this but will need to do full PFTs  - full PFTs next visit  - continue GERD treatment with PPI two times a day   - referral to allergy/immunology for further workup of markedly low IgA, IgG and IgM levels. This could all be due to lymphoma and recent chemotherapy, but he could also have an immunodeficiency which could explain his chronic cough and recurrent bronchitis  - HRCT to evaluate for any bronchiectasis or other parenchymal lung disease  - continue follow up with [facility] for chronic sinus disease  - continue the flonase, astelin nasal sprays per ENT  - no indication for another round of doxy at this time, since he is improving. Cautioned against risk of frequent antibiotic use including development of resistant organisms. He will call if his symptoms worsen and he feels he needs to go back on antibiotics.  - continue IVIg at the discretion of his oncologist Dr. Crooks.  - continue CPAP for JAZLYN and  follow up with his sleep medicine team.  - UTD with flu shot. Will give PCV13 today. He will need PSV23 in one year.    Follow up in 2-3 months with full set of PFT's.       CCx: cough, chest congestion    HPI: 54 year old man with history of low-grade follicular lymphoma with progression, currently on CHOP chemotherapy, prostate cancer, GERD, JAZLYN currently using CPAP, chronic sinusitis, presenting for evaluation of chronic coughing at the request of his oncologist. He says he's had issues with cough and bronchitis ever since he was diagnosed with lymphoma, about 12 years ago. It seems to have gotten worse in the last several months. He's gotten several rounds of antibiotics from his PMD (Augmentin, azithromycin, doxy) which seem to help with his cough and symptoms.  He saw ENT over the summer (Dr. Tello, [facility]) and was recommended to receive IVIg treatment due to history of low IgG and IgA levels. The IVIg and doxy does seem to clear up his cough but after he finishes treatment it seems to come back. He denies any significant sinus drainage or post-nasal drip. No wheezing. He takes Qvar, but is not sure if it is helpful. His GERD is well controlled on two times a day PPI (was on 20mg, recommended to increase to 40mg two times a day by his oncologist, didn't note any difference). Symptoms worse since last chemo treatment, Sept 2019.  He was also seen by allergy/immunology at [facility], Dr. Kumar, but I do not have his office notes.  He denies chest pain, palpitations, fevers, night sweats or chills.     ROS:  A 12-system review was obtained and was negative with the exception of the symptoms endorsed in the history of present illness.    PMH:  Past Medical History:   Diagnosis Date     Arthritis     shoulder per H & P     GERD (gastroesophageal reflux disease)      H/O pyloric stenosis     as an infant per H & P      IgA deficiency (H)      Inguinal hernia     per H & P      Lazy eye     per H & P      Non  Hodgkin's lymphoma (H)      Prostate CA (H)      Sleep apnea     CPAP       PSH:  Past Surgical History:   Procedure Laterality Date     ABDOMINAL SURGERY      for pyloric stenosis as an infant     COLONOSCOPY       DISTAL CLAVICLE EXCISION      per H & P      HERNIA REPAIR      inguinal     port a cath placement  2017     WI LAP,PROSTATECTOMY,RADICAL,W/NERVE SPARE,INCL ROBOTIC N/A 11/15/2017    Procedure: ROBOTIC ASSISTED RADICAL RETROPUBIC PROSTATECTOMY BILATERAL PELVIC LYMPH NODE DISSECTION ;  Surgeon: Ezio Steele MD;  Location: [facility] OR;  Service: Urology     shoulder arthroscopy Left      TONSILLECTOMY       US LYMPH NODE BIOPSY  2019       Allergies:  Allergies   Allergen Reactions     Zofran (As Hydrochloride) [Ondansetron Hcl] Itching       Family HX:  Family History   Problem Relation Age of Onset     Colon cancer Mother         diagnosed in her late 40s     Lymphoma Father         non-Hodgkins lymphoma, diagnosed 70s     No Medical Problems Sister      No Medical Problems Brother      No Medical Problems Son      No Medical Problems Sister      No Medical Problems Son      Lung cancer Maternal Grandmother      Lung cancer Maternal Uncle        Social Hx:  Social History     Socioeconomic History     Marital status:      Spouse name: Not on file     Number of children: Not on file     Years of education: Not on file     Highest education level: Not on file   Occupational History     Not on file   Social Needs     Financial resource strain: Not on file     Food insecurity:     Worry: Not on file     Inability: Not on file     Transportation needs:     Medical: Not on file     Non-medical: Not on file   Tobacco Use     Smoking status: Former Smoker     Packs/day: 1.00     Years: 20.00     Pack years: 20.00     Last attempt to quit:      Years since quittin.8     Smokeless tobacco: Never Used   Substance and Sexual Activity     Alcohol use: Yes     Alcohol/week:  10.0 standard drinks     Types: 10 Cans of beer per week     Drug use: No     Types: Marijuana     Comment: none in the past 2 years     Sexual activity: Never   Lifestyle     Physical activity:     Days per week: Not on file     Minutes per session: Not on file     Stress: Not on file   Relationships     Social connections:     Talks on phone: Not on file     Gets together: Not on file     Attends Advent service: Not on file     Active member of club or organization: Not on file     Attends meetings of clubs or organizations: Not on file     Relationship status: Not on file     Intimate partner violence:     Fear of current or ex partner: Not on file     Emotionally abused: Not on file     Physically abused: Not on file     Forced sexual activity: Not on file   Other Topics Concern     Not on file   Social History Narrative     Not on file       Current Meds:  Current Outpatient Medications   Medication Sig Dispense Refill     albuterol (PROAIR HFA;PROVENTIL HFA;VENTOLIN HFA) 90 mcg/actuation inhaler Inhale 2 puffs every 6 (six) hours as needed for wheezing.       azelastine (ASTELIN) 137 mcg (0.1 %) nasal spray 1 spray into each nostril 2 (two) times a day.  11     beclomethasone (QVAR) 80 mcg/actuation inhaler Inhale 1 puff 2 (two) times a day as needed.              fluticasone propionate (FLONASE) 50 mcg/actuation nasal spray        lidocaine-prilocaine (EMLA) cream Apply to port 1/2 to 1 hour prior to accessing. 30 g 2     naproxen (NAPROSYN) 500 MG tablet Take 500 mg by mouth as needed.  2     omeprazole (PRILOSEC) 20 MG capsule Take 1 capsule (20 mg total) by mouth 2 (two) times a day before meals. (Patient taking differently: Take 40 mg by mouth 2 (two) times a day before meals.       ) 60 capsule 2     psyllium (METAMUCIL) 3.4 gram packet Take 1 packet by mouth daily.       tolterodine (DETROL LA) 4 MG ER capsule Take 4 mg by mouth daily.  3     VIRTUSSIN AC  mg/5 mL liquid TK 10 ML PO EVERY 4  "HOURS AS NEEDED FOR COUGH.  0     doxycycline (VIBRAMYCIN) 100 MG capsule TK 1 C BID TO TREAT INFECTION FOR 10 DAYS  1     No current facility-administered medications for this visit.      Facility-Administered Medications Ordered in Other Visits   Medication Dose Route Frequency Provider Last Rate Last Dose     HYDROcodone-acetaminophen  mg per tablet 1-2 tablet (NORCO )  1-2 tablet Oral Q6H PRN Itzel Edwards, CNS         naloxone injection 0.2-0.4 mg (NARCAN)  0.2-0.4 mg Intravenous PRN Itzel Edwards, CNS        Or     naloxone injection 0.2-0.4 mg (NARCAN)  0.2-0.4 mg Intramuscular PRN Itzel Edwards, CNS           Physical Exam:  /74   Pulse 76   Resp 24   Ht 5' 9\" (1.753 m)   Wt (!) 242 lb 3.2 oz (109.9 kg)   SpO2 99% Comment: RA  BMI 35.77 kg/m    Gen: awake, alert, oriented, no distress  HEENT: nasal turbinates are unremarkable, no oropharyngeal lesions, no cervical or supraclavicular lymphadenopathy  CV: RRR, no M/G/R  Resp: CTAB, no focal crackles or wheezes  Abd: soft, nontender, no palpable organomegaly  Skin: no apparent rashes  Ext: no cyanosis, clubbing or edema  Neuro: alert, nonfocal    Labs:  Reviewed  CBC eos  H/H stable  Chem panel unremarkable  IgG 390  IgA 10  IgE normal  IgM 7    Imaging studies:  CT chest April 2019  IMPRESSION:   CONCLUSION:  1.  Progression of lymphadenopathy in the lower retroperitoneum and right side of the pelvis and inguinal canal since 12/31/2018.    PET/CT 9/11/2019  IMPRESSION:   CONCLUSION:  No substantial change since 06/24/2019. No evidence of recurrent or active lymphoma.    PFT's   None available  Spirometry today, best effort (2 of 3)  FEV1 3.25L (90 % predicted)  FVC 3.75L 79%  Ratio 0.87  Flow volume loop normal; does not suggest obstruction.    Carlos New MD (Avi)  [facility]  Pager [phone]  Clinic [phone]    "

## 2021-06-03 NOTE — PATIENT INSTRUCTIONS - HE
1 month trial of montelukast    Dulera 200/5 2 puffs twice daily     Follow in 1 month    Check immunoglobulins in 4-6 weeks--have to be off IVIG    May need to support with IVIG during recovery from chemotherapy

## 2021-06-03 NOTE — PROGRESS NOTES
Chief complaint: Possible immunodeficiency    History of present illness: This is a pleasant 54-year-old gentleman with a history of lymphoma, chronic sinus disease and allergic rhinitis to dust mite that I was asked to see by Dr. New for possible immunodeficiency.  He last received chemotherapy in August.  He states he will now be holding on chemotherapy for quite some time.  He had his immunoglobulins checked prior to his chemotherapy infusion IgG was 390, IgA was 10 and Ig was 7.  Prior to that they were checked in March with similar values.  No other immunodeficiency work-up was done.  He has received several doses of IVIG under the direction of his oncologist per the request of his ENT physician given his chronic sinusitis and cough.  Last infusion was 2 weeks ago.  Despite this, he feels he is coming down with a respiratory cold.  Previously he was diagnosed with multiple bouts of bronchitis.  He was seen by pulmonary who performed a CT of the chest.  No bronchiectasis was found.  He reports that he continues to have this cough.  He states he has had it off and on for about 12 years.  That is when he was originally diagnosed with lymphoma.  He describes the cough is coarse.  Sometimes he coughs to the point of gagging himself.  He does cough in his sleep.  Albuterol does provide some temporary relief.  He was on Qvar but with talking with the patient he was not using it regularly.  He would use it more as needed and seemed to not understand how to use this regularly.  In office spirometry was done and was normal.  No nitric oxide level was done that I can find.  He does have a repeat visit with pulmonary with a complete pulmonary function test set up for the end of December.  He was tested for allergies at an outside allergist.  Positive to dust mites and he does work as a .  He states he has a lot of exposures to dust mites.  He states doxycycline seem to be the most effective antibiotic for the  cough.  He denies any wheezing.  Occasionally becomes short of breath with the cough. He denies any other infections.  He states he has not had gastrointestinal infections or skin infections.  He did have shingles.  No family history of immunodeficiency he does not think he has required a blood transfusion.  He recently received the pneumococcal 13 vaccine.  He also received the Tdap in 2013.    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  No pets at home.  Lives in a home with central air    Family history: Negative for immunodeficiency    Review of Systems performed as above and the remainder is negative.         Current Outpatient Medications:      albuterol (PROAIR HFA;PROVENTIL HFA;VENTOLIN HFA) 90 mcg/actuation inhaler, Inhale 2 puffs every 6 (six) hours as needed for wheezing., Disp: , Rfl:      azelastine (ASTELIN) 137 mcg (0.1 %) nasal spray, 1 spray into each nostril 2 (two) times a day., Disp: , Rfl: 11     beclomethasone (QVAR) 80 mcg/actuation inhaler, Inhale 1 puff 2 (two) times a day as needed.    , Disp: , Rfl:      fluticasone propionate (FLONASE) 50 mcg/actuation nasal spray, , Disp: , Rfl:      lidocaine-prilocaine (EMLA) cream, Apply to port 1/2 to 1 hour prior to accessing., Disp: 30 g, Rfl: 2     naproxen (NAPROSYN) 500 MG tablet, Take 500 mg by mouth as needed., Disp: , Rfl: 2     omeprazole (PRILOSEC) 20 MG capsule, Take 1 capsule (20 mg total) by mouth 2 (two) times a day before meals. (Patient taking differently: Take 40 mg by mouth 2 (two) times a day before meals.    ), Disp: 60 capsule, Rfl: 2     psyllium (METAMUCIL) 3.4 gram packet, Take 1 packet by mouth daily., Disp: , Rfl:      tolterodine (DETROL LA) 4 MG ER capsule, Take 4 mg by mouth daily., Disp: , Rfl: 3     VIRTUSSIN AC  mg/5  "mL liquid, TK 10 ML PO EVERY 4 HOURS AS NEEDED FOR COUGH., Disp: , Rfl: 0     doxycycline (VIBRAMYCIN) 100 MG capsule, TK 1 C BID TO TREAT INFECTION FOR 10 DAYS, Disp: , Rfl: 1     mometasone-formoterol (DULERA) 200-5 mcg/actuation HFAA inhaler, Inhale 2 puffs 2 (two) times a day., Disp: 1 Inhaler, Rfl: 1     montelukast (SINGULAIR) 10 mg tablet, Take 1 tablet (10 mg total) by mouth at bedtime., Disp: 30 tablet, Rfl: 1  No current facility-administered medications for this visit.     Facility-Administered Medications Ordered in Other Visits:      HYDROcodone-acetaminophen  mg per tablet 1-2 tablet (NORCO ), 1-2 tablet, Oral, Q6H PRN, Itzel Edwards, CNS     naloxone injection 0.2-0.4 mg (NARCAN), 0.2-0.4 mg, Intravenous, PRN **OR** naloxone injection 0.2-0.4 mg (NARCAN), 0.2-0.4 mg, Intramuscular, PRN, Itzel Edwards, CNS    Allergies   Allergen Reactions     Zofran (As Hydrochloride) [Ondansetron Hcl] Itching       /80   Pulse 88   Ht 5' 8.5\" (1.74 m)   Wt (!) 238 lb (108 kg)   BMI 35.66 kg/m    Gen: Pleasant male not in acute distress  HEENT: Eyes no erythema of the bulbar or palpebral conjunctiva, no edema. Ears: TMs well visualized, no effusions. Nose: No congestion, mucosa normal. Mouth: Throat clear, no lip or tongue edema.   Cardiac: Regular rate and rhythm, no murmurs, rubs or gallops  Respiratory: Clear to auscultation bilaterally, no adventitious breath sounds  Lymph: No supraclavicular or cervical lymphadenopathy  Skin: No rashes or lesions  Psych: Alert and oriented times 3    FENO: 23    Impression report and plan:    1.  Cough  2.  Immunodeficiency    This is likely secondary to his chemotherapy.  He received IVIG 2 weeks ago.  For this reason, I cannot perform any laboratory work regarding immunodeficiency today.  I would recommend in 4 weeks that he have IgG, IgM and IgA checked.  I would also like him to check his specific antibody responses to tetanus and pneumococcal.  It " can take some time for his immune system to recover from chemotherapy.  If he needs further management, I recommend follow-up with an immunologist at [facility].  For his cough, I am concerned perhaps about an asthmatic component.  His spirometry was normal and he was not using his controller medication correctly.  For this reason I would like him to use Dulera 2 puffs twice daily.  Instruction given how to use this medication properly.  He should use this for 1 month.  I would also like him to use montelukast given his dust mite allergy in his profession.  I cautioned him to the rare side effect of mood disturbance.  In 1 month, follow-up at that time and make a long-term plan from there pending his cough.  IVIG management per his oncologist.

## 2021-06-03 NOTE — PROGRESS NOTES
Juvenal arrived A&OX4 ambulatory and stable, confirms he is here for IVIG dose #2 to treat chronic sinusitis. Seated in chair #5.He has ongoing sinus congestion, chest tightness and cough that is sometimes productive. He denies fevers. States he has lost a couple of pounds but that his appetite is OK. Started new round of Doxycycline-has one week left. Juvenal states he feels his sx have somewhat improved since getting his IVIG last week  POC/medication education reviewed, pt stated understanding and agreed to plan. Port accessed w/ ease, excellent blood return and line easily flushed. VS WNL.  Pre meds APAP 650mg p.o. and diphenhydramine 25mg p.o. given.  IVIG 70g infused at titrated rate per protocol; partially into the rate of 3.5mg/kg/min Freddie SBP was elevated , he had a headache and felt slightly nauseated. Infusion rate slowed to 3mg/kg/min for 30min, @ 1329 nausea subsided and headache improved; VS improved- infusion rate left at 3mg/kg/min for remainder of IVIG infusion  Juvenal tolerated remaining infusion w/o sxs adverse reaction. Line flushed NS and pt monitored 30min post infusion. VSS   port flushed NS20mL, instilled w heparin 6mL 1:100 and gripper needle dc'd, site covered w gauze and papertape.  Dc education/AVS reviewed, pt stated understanding and that his needs were met today.  1510 Juvenal katz'breann A&Ox4 ambulatory and stable

## 2021-06-03 NOTE — PATIENT INSTRUCTIONS - HE
Patient Education     Immune Globulin Solution for injection  What is this medicine?  IMMUNE GLOBULIN (im MUNE GLOB jones solano) helps to prevent or reduce the severity of certain infections in patients who are at risk. This medicine is collected from the pooled blood of many donors. It is used to treat immune system problems, thrombocytopenia, and Kawasaki syndrome.  This medicine may be used for other purposes; ask your health care provider or pharmacist if you have questions.  What should I tell my health care provider before I take this medicine?  They need to know if you have any of these conditions:    diabetes    extremely low or no immune antibodies in the blood    heart disease    history of blood clots    hyperprolinemia    infection in the blood, sepsis    kidney disease    taking medicine that may change kidney function - ask your health care provider about your medicine    an unusual or allergic reaction to human immune globulin, albumin, maltose, sucrose, polysorbate 80, other medicines, foods, dyes, or preservatives    pregnant or trying to get pregnant    breast-feeding  How should I use this medicine?  This medicine is for injection into a muscle or infusion into a vein or skin. It is usually given by a health care professional in a hospital or clinic setting.  In rare cases, some brands of this medicine might be given at home. You will be taught how to give this medicine. Use exactly as directed. Take your medicine at regular intervals. Do not take your medicine more often than directed.  Talk to your pediatrician regarding the use of this medicine in children. Special care may be needed.  Overdosage: If you think you have taken too much of this medicine contact a poison control center or emergency room at once.  NOTE: This medicine is only for you. Do not share this medicine with others.  What if I miss a dose?  It is important not to miss your dose. Call your doctor or health care professional if  you are unable to keep an appointment. If you give yourself the medicine and you miss a dose, take it as soon as you can. If it is almost time for your next dose, take only that dose. Do not take double or extra doses.  What may interact with this medicine?    aspirin and aspirin-like medicines    cisplatin    cyclosporine    medicines for infection like acyclovir, adefovir, amphotericin B, bacitracin, cidofovir, foscarnet, ganciclovir, gentamicin, pentamidine, vancomycin    NSAIDS, medicines for pain and inflammation, like ibuprofen or naproxen    pamidronate    vaccines    zoledronic acid  This list may not describe all possible interactions. Give your health care provider a list of all the medicines, herbs, non-prescription drugs, or dietary supplements you use. Also tell them if you smoke, drink alcohol, or use illegal drugs. Some items may interact with your medicine.  What should I watch for while using this medicine?  Your condition will be monitored carefully while you are receiving this medicine.  This medicine is made from pooled blood donations of many different people. It may be possible to pass an infection in this medicine. However, the donors are screened for infections and all products are tested for HIV and hepatitis. The medicine is treated to kill most or all bacteria and viruses. Talk to your doctor about the risks and benefits of this medicine.  Do not have vaccinations for at least 14 days before, or until at least 3 months after receiving this medicine.  What side effects may I notice from receiving this medicine?  Side effects that you should report to your doctor or health care professional as soon as possible:    allergic reactions like skin rash, itching or hives, swelling of the face, lips, or tongue    breathing problems    chest pain or tightness    fever, chills    headache with nausea, vomiting    neck pain or difficulty moving neck    pain when moving eyes    pain, swelling, warmth  in the leg    problems with balance, talking, walking    sudden weight gain    swelling of the ankles, feet, hands    trouble passing urine or change in the amount of urine  Side effects that usually do not require medical attention (report to your doctor or health care professional if they continue or are bothersome):    dizzy, drowsy    flushing    increased sweating    leg cramps    muscle aches and pains    pain at site where injected  This list may not describe all possible side effects. Call your doctor for medical advice about side effects. You may report side effects to FDA at 6-570-FDA-8167.  Where should I keep my medicine?  Keep out of the reach of children.  This drug is usually given in a hospital or clinic and will not be stored at home.  In rare cases, some brands of this medicine may be given at home. If you are using this medicine at home, you will be instructed on how to store this medicine. Throw away any unused medicine after the expiration date on the label.  NOTE: This sheet is a summary. It may not cover all possible information. If you have questions about this medicine, talk to your doctor, pharmacist, or health care provider.  NOTE:This sheet is a summary. It may not cover all possible information. If you have questions about this medicine, talk to your doctor, pharmacist, or health care provider. Copyright  2015 [employer] Standard

## 2021-06-04 ENCOUNTER — ALLIED HEALTH/NURSE VISIT (OUTPATIENT)
Dept: TRANSPLANT | Facility: CLINIC | Age: 56
End: 2021-06-04
Payer: COMMERCIAL

## 2021-06-04 ENCOUNTER — ONCOLOGY VISIT (OUTPATIENT)
Dept: TRANSPLANT | Facility: CLINIC | Age: 56
End: 2021-06-04
Attending: PHYSICIAN ASSISTANT
Payer: COMMERCIAL

## 2021-06-04 ENCOUNTER — APPOINTMENT (OUTPATIENT)
Dept: LAB | Facility: CLINIC | Age: 56
End: 2021-06-04
Payer: COMMERCIAL

## 2021-06-04 ENCOUNTER — MEDICAL CORRESPONDENCE (OUTPATIENT)
Dept: TRANSPLANT | Facility: CLINIC | Age: 56
End: 2021-06-04

## 2021-06-04 VITALS
WEIGHT: 240 LBS | TEMPERATURE: 99.2 F | SYSTOLIC BLOOD PRESSURE: 117 MMHG | BODY MASS INDEX: 36.49 KG/M2 | DIASTOLIC BLOOD PRESSURE: 74 MMHG | HEART RATE: 72 BPM | OXYGEN SATURATION: 97 %

## 2021-06-04 VITALS
DIASTOLIC BLOOD PRESSURE: 57 MMHG | WEIGHT: 243 LBS | SYSTOLIC BLOOD PRESSURE: 109 MMHG | HEART RATE: 71 BPM | BODY MASS INDEX: 36.95 KG/M2 | OXYGEN SATURATION: 95 % | TEMPERATURE: 98 F

## 2021-06-04 VITALS
HEIGHT: 69 IN | DIASTOLIC BLOOD PRESSURE: 79 MMHG | BODY MASS INDEX: 35.7 KG/M2 | BODY MASS INDEX: 36.37 KG/M2 | HEART RATE: 68 BPM | WEIGHT: 240 LBS | WEIGHT: 241 LBS | OXYGEN SATURATION: 98 % | SYSTOLIC BLOOD PRESSURE: 135 MMHG | HEIGHT: 68 IN | WEIGHT: 240 LBS | BODY MASS INDEX: 36.37 KG/M2 | HEIGHT: 68 IN | WEIGHT: 232.9 LBS | BODY MASS INDEX: 34.64 KG/M2 | TEMPERATURE: 98.5 F

## 2021-06-04 VITALS
TEMPERATURE: 97.6 F | HEART RATE: 61 BPM | DIASTOLIC BLOOD PRESSURE: 76 MMHG | SYSTOLIC BLOOD PRESSURE: 137 MMHG | HEIGHT: 68 IN | WEIGHT: 236.8 LBS | BODY MASS INDEX: 35.89 KG/M2 | OXYGEN SATURATION: 97 %

## 2021-06-04 VITALS
TEMPERATURE: 98.3 F | OXYGEN SATURATION: 94 % | DIASTOLIC BLOOD PRESSURE: 79 MMHG | BODY MASS INDEX: 35.25 KG/M2 | SYSTOLIC BLOOD PRESSURE: 129 MMHG | WEIGHT: 237 LBS | HEART RATE: 74 BPM

## 2021-06-04 VITALS
TEMPERATURE: 98.5 F | SYSTOLIC BLOOD PRESSURE: 155 MMHG | BODY MASS INDEX: 36.15 KG/M2 | DIASTOLIC BLOOD PRESSURE: 82 MMHG | HEART RATE: 64 BPM | WEIGHT: 243 LBS | OXYGEN SATURATION: 97 %

## 2021-06-04 VITALS
OXYGEN SATURATION: 96 % | WEIGHT: 240 LBS | TEMPERATURE: 97.7 F | SYSTOLIC BLOOD PRESSURE: 141 MMHG | BODY MASS INDEX: 35.96 KG/M2 | DIASTOLIC BLOOD PRESSURE: 79 MMHG | HEART RATE: 73 BPM | RESPIRATION RATE: 18 BRPM

## 2021-06-04 VITALS
HEART RATE: 51 BPM | BODY MASS INDEX: 36.04 KG/M2 | WEIGHT: 237 LBS | SYSTOLIC BLOOD PRESSURE: 126 MMHG | OXYGEN SATURATION: 96 % | TEMPERATURE: 98 F | RESPIRATION RATE: 18 BRPM | DIASTOLIC BLOOD PRESSURE: 70 MMHG

## 2021-06-04 VITALS
HEIGHT: 69 IN | OXYGEN SATURATION: 100 % | SYSTOLIC BLOOD PRESSURE: 135 MMHG | RESPIRATION RATE: 18 BRPM | WEIGHT: 247.1 LBS | HEART RATE: 76 BPM | BODY MASS INDEX: 36.6 KG/M2 | DIASTOLIC BLOOD PRESSURE: 79 MMHG

## 2021-06-04 VITALS
WEIGHT: 236 LBS | OXYGEN SATURATION: 97 % | DIASTOLIC BLOOD PRESSURE: 72 MMHG | HEART RATE: 62 BPM | SYSTOLIC BLOOD PRESSURE: 134 MMHG | BODY MASS INDEX: 35.11 KG/M2

## 2021-06-04 VITALS
HEART RATE: 64 BPM | WEIGHT: 238 LBS | OXYGEN SATURATION: 97 % | TEMPERATURE: 98.3 F | SYSTOLIC BLOOD PRESSURE: 139 MMHG | DIASTOLIC BLOOD PRESSURE: 83 MMHG | BODY MASS INDEX: 36.19 KG/M2

## 2021-06-04 VITALS
HEART RATE: 74 BPM | SYSTOLIC BLOOD PRESSURE: 128 MMHG | OXYGEN SATURATION: 96 % | WEIGHT: 238.7 LBS | HEIGHT: 69 IN | BODY MASS INDEX: 35.35 KG/M2 | DIASTOLIC BLOOD PRESSURE: 68 MMHG | RESPIRATION RATE: 16 BRPM

## 2021-06-04 VITALS
WEIGHT: 242.8 LBS | HEART RATE: 74 BPM | TEMPERATURE: 98.3 F | SYSTOLIC BLOOD PRESSURE: 130 MMHG | RESPIRATION RATE: 18 BRPM | BODY MASS INDEX: 36.12 KG/M2 | DIASTOLIC BLOOD PRESSURE: 77 MMHG | OXYGEN SATURATION: 98 %

## 2021-06-04 VITALS — WEIGHT: 235 LBS | BODY MASS INDEX: 35.73 KG/M2

## 2021-06-04 DIAGNOSIS — C82.90 FOLLICULAR LYMPHOMA (H): ICD-10-CM

## 2021-06-04 DIAGNOSIS — Z86.2 PERSONAL HISTORY OF DISEASES OF BLOOD AND BLOOD-FORMING ORGANS: ICD-10-CM

## 2021-06-04 DIAGNOSIS — C82.08 FOLLICULAR LYMPHOMA GRADE I, LYMPH NODES OF MULTIPLE SITES (H): ICD-10-CM

## 2021-06-04 DIAGNOSIS — C82.08 FOLLICULAR LYMPHOMA GRADE I, LYMPH NODES OF MULTIPLE SITES (H): Primary | ICD-10-CM

## 2021-06-04 LAB
ABO + RH BLD: NORMAL
ABO + RH BLD: NORMAL
ALBUMIN SERPL-MCNC: 3.1 G/DL (ref 3.4–5)
ALBUMIN UR-MCNC: NEGATIVE MG/DL
ALP SERPL-CCNC: 85 U/L (ref 40–150)
ALT SERPL W P-5'-P-CCNC: 40 U/L (ref 0–70)
ANION GAP SERPL CALCULATED.3IONS-SCNC: 8 MMOL/L (ref 3–14)
APPEARANCE UR: CLEAR
APTT PPP: 33 SEC (ref 22–37)
AST SERPL W P-5'-P-CCNC: 21 U/L (ref 0–45)
BASOPHILS # BLD AUTO: 0.1 10E9/L (ref 0–0.2)
BASOPHILS NFR BLD AUTO: 0.7 %
BILIRUB DIRECT SERPL-MCNC: 0.1 MG/DL (ref 0–0.2)
BILIRUB SERPL-MCNC: 0.3 MG/DL (ref 0.2–1.3)
BILIRUB UR QL STRIP: NEGATIVE
BLD GP AB SCN SERPL QL: NORMAL
BLOOD BANK CMNT PATIENT-IMP: NORMAL
BUN SERPL-MCNC: 16 MG/DL (ref 7–30)
CALCIUM SERPL-MCNC: 9.2 MG/DL (ref 8.5–10.1)
CHLORIDE SERPL-SCNC: 105 MMOL/L (ref 94–109)
CO2 SERPL-SCNC: 27 MMOL/L (ref 20–32)
COLOR UR AUTO: YELLOW
CREAT SERPL-MCNC: 1.1 MG/DL (ref 0.66–1.25)
CRP SERPL-MCNC: 40.6 MG/L (ref 0–8)
DIFFERENTIAL METHOD BLD: ABNORMAL
EOSINOPHIL # BLD AUTO: 0.7 10E9/L (ref 0–0.7)
EOSINOPHIL NFR BLD AUTO: 5.6 %
ERYTHROCYTE [DISTWIDTH] IN BLOOD BY AUTOMATED COUNT: 15.1 % (ref 10–15)
FERRITIN SERPL-MCNC: 141 NG/ML (ref 26–388)
GFR SERPL CREATININE-BSD FRML MDRD: 74 ML/MIN/{1.73_M2}
GLUCOSE SERPL-MCNC: 131 MG/DL (ref 70–99)
GLUCOSE UR STRIP-MCNC: NEGATIVE MG/DL
HBV CORE AB SERPL QL IA: NONREACTIVE
HBV SURFACE AG SERPL QL IA: NONREACTIVE
HCT VFR BLD AUTO: 34 % (ref 40–53)
HCV AB SERPL QL IA: NONREACTIVE
HGB BLD-MCNC: 10.8 G/DL (ref 13.3–17.7)
HGB UR QL STRIP: NEGATIVE
HIV 1+2 AB+HIV1 P24 AG SERPL QL IA: NONREACTIVE
IMM GRANULOCYTES # BLD: 0.2 10E9/L (ref 0–0.4)
IMM GRANULOCYTES NFR BLD: 1.5 %
INR PPP: 0.97 (ref 0.86–1.14)
KETONES UR STRIP-MCNC: NEGATIVE MG/DL
LABORATORY COMMENT REPORT: NORMAL
LDH SERPL L TO P-CCNC: 235 U/L (ref 85–227)
LEUKOCYTE ESTERASE UR QL STRIP: NEGATIVE
LYMPHOCYTES # BLD AUTO: 6.7 10E9/L (ref 0.8–5.3)
LYMPHOCYTES NFR BLD AUTO: 51.4 %
MAGNESIUM SERPL-MCNC: 2.2 MG/DL (ref 1.6–2.3)
MCH RBC QN AUTO: 29 PG (ref 26.5–33)
MCHC RBC AUTO-ENTMCNC: 31.8 G/DL (ref 31.5–36.5)
MCV RBC AUTO: 91 FL (ref 78–100)
MONOCYTES # BLD AUTO: 1.6 10E9/L (ref 0–1.3)
MONOCYTES NFR BLD AUTO: 12.5 %
NEUTROPHILS # BLD AUTO: 3.7 10E9/L (ref 1.6–8.3)
NEUTROPHILS NFR BLD AUTO: 28.3 %
NITRATE UR QL: NEGATIVE
NRBC # BLD AUTO: 0 10*3/UL
NRBC BLD AUTO-RTO: 0 /100
PH UR STRIP: 5 PH (ref 5–7)
PHOSPHATE SERPL-MCNC: 3.1 MG/DL (ref 2.5–4.5)
PLATELET # BLD AUTO: 93 10E9/L (ref 150–450)
PLATELET # BLD EST: ABNORMAL 10*3/UL
POTASSIUM SERPL-SCNC: 4.2 MMOL/L (ref 3.4–5.3)
PROT SERPL-MCNC: 6.4 G/DL (ref 6.8–8.8)
RBC # BLD AUTO: 3.73 10E12/L (ref 4.4–5.9)
RBC #/AREA URNS AUTO: <1 /HPF (ref 0–2)
SARS-COV-2 RNA RESP QL NAA+PROBE: NEGATIVE
SARS-COV-2 RNA RESP QL NAA+PROBE: NORMAL
SODIUM SERPL-SCNC: 140 MMOL/L (ref 133–144)
SOURCE: NORMAL
SP GR UR STRIP: 1.01 (ref 1–1.03)
SPECIMEN EXP DATE BLD: NORMAL
SPECIMEN SOURCE: NORMAL
SPECIMEN SOURCE: NORMAL
SQUAMOUS #/AREA URNS AUTO: <1 /HPF (ref 0–1)
T PALLIDUM AB SER QL: NONREACTIVE
URATE SERPL-MCNC: 3.4 MG/DL (ref 3.5–7.2)
UROBILINOGEN UR STRIP-MCNC: 0 MG/DL (ref 0–2)
WBC # BLD AUTO: 13 10E9/L (ref 4–11)
WBC #/AREA URNS AUTO: <1 /HPF (ref 0–5)

## 2021-06-04 PROCEDURE — 82248 BILIRUBIN DIRECT: CPT

## 2021-06-04 PROCEDURE — 80053 COMPREHEN METABOLIC PANEL: CPT

## 2021-06-04 PROCEDURE — 86665 EPSTEIN-BARR CAPSID VCA: CPT

## 2021-06-04 PROCEDURE — 87798 DETECT AGENT NOS DNA AMP: CPT

## 2021-06-04 PROCEDURE — 99214 OFFICE O/P EST MOD 30 MIN: CPT

## 2021-06-04 PROCEDURE — 82728 ASSAY OF FERRITIN: CPT

## 2021-06-04 PROCEDURE — 87516 HEPATITIS B DNA AMP PROBE: CPT

## 2021-06-04 PROCEDURE — 84550 ASSAY OF BLOOD/URIC ACID: CPT

## 2021-06-04 PROCEDURE — 94729 DIFFUSING CAPACITY: CPT | Performed by: INTERNAL MEDICINE

## 2021-06-04 PROCEDURE — 87389 HIV-1 AG W/HIV-1&-2 AB AG IA: CPT

## 2021-06-04 PROCEDURE — 85730 THROMBOPLASTIN TIME PARTIAL: CPT

## 2021-06-04 PROCEDURE — 86790 VIRUS ANTIBODY NOS: CPT

## 2021-06-04 PROCEDURE — 87535 HIV-1 PROBE&REVERSE TRNSCRPJ: CPT

## 2021-06-04 PROCEDURE — 86704 HEP B CORE ANTIBODY TOTAL: CPT

## 2021-06-04 PROCEDURE — 82784 ASSAY IGA/IGD/IGG/IGM EACH: CPT

## 2021-06-04 PROCEDURE — 81001 URINALYSIS AUTO W/SCOPE: CPT

## 2021-06-04 PROCEDURE — U0005 INFEC AGEN DETEC AMPLI PROBE: HCPCS

## 2021-06-04 PROCEDURE — 87521 HEPATITIS C PROBE&RVRS TRNSC: CPT

## 2021-06-04 PROCEDURE — 86140 C-REACTIVE PROTEIN: CPT

## 2021-06-04 PROCEDURE — 83615 LACTATE (LD) (LDH) ENZYME: CPT

## 2021-06-04 PROCEDURE — 85025 COMPLETE CBC W/AUTO DIFF WBC: CPT

## 2021-06-04 PROCEDURE — 94060 EVALUATION OF WHEEZING: CPT | Performed by: INTERNAL MEDICINE

## 2021-06-04 PROCEDURE — 86753 PROTOZOA ANTIBODY NOS: CPT

## 2021-06-04 PROCEDURE — 86644 CMV ANTIBODY: CPT

## 2021-06-04 PROCEDURE — U0003 INFECTIOUS AGENT DETECTION BY NUCLEIC ACID (DNA OR RNA); SEVERE ACUTE RESPIRATORY SYNDROME CORONAVIRUS 2 (SARS-COV-2) (CORONAVIRUS DISEASE [COVID-19]), AMPLIFIED PROBE TECHNIQUE, MAKING USE OF HIGH THROUGHPUT TECHNOLOGIES AS DESCRIBED BY CMS-2020-01-R: HCPCS

## 2021-06-04 PROCEDURE — 86696 HERPES SIMPLEX TYPE 2 TEST: CPT

## 2021-06-04 PROCEDURE — G0463 HOSPITAL OUTPT CLINIC VISIT: HCPCS

## 2021-06-04 PROCEDURE — 86803 HEPATITIS C AB TEST: CPT

## 2021-06-04 PROCEDURE — 86695 HERPES SIMPLEX TYPE 1 TEST: CPT

## 2021-06-04 PROCEDURE — 83735 ASSAY OF MAGNESIUM: CPT

## 2021-06-04 PROCEDURE — 86850 RBC ANTIBODY SCREEN: CPT

## 2021-06-04 PROCEDURE — 85610 PROTHROMBIN TIME: CPT

## 2021-06-04 PROCEDURE — 94726 PLETHYSMOGRAPHY LUNG VOLUMES: CPT | Performed by: INTERNAL MEDICINE

## 2021-06-04 PROCEDURE — 87340 HEPATITIS B SURFACE AG IA: CPT

## 2021-06-04 PROCEDURE — 86900 BLOOD TYPING SEROLOGIC ABO: CPT

## 2021-06-04 PROCEDURE — 84100 ASSAY OF PHOSPHORUS: CPT

## 2021-06-04 PROCEDURE — 86780 TREPONEMA PALLIDUM: CPT

## 2021-06-04 PROCEDURE — 86901 BLOOD TYPING SEROLOGIC RH(D): CPT

## 2021-06-04 RX ORDER — IDELALISIB 150 MG/1
150 TABLET, FILM COATED ORAL 2 TIMES DAILY
COMMUNITY
Start: 2021-06-04 | End: 2021-06-16

## 2021-06-04 ASSESSMENT — MIFFLIN-ST. JEOR: SCORE: 1928.34

## 2021-06-04 ASSESSMENT — PAIN SCALES - GENERAL: PAINLEVEL: MILD PAIN (2)

## 2021-06-04 NOTE — PROGRESS NOTES
BMT Teaching Flowsheet      Teaching Topic: work up    Person(s) involved in teaching: Patient  Motivation Level  Asks Questions: Yes  Eager to Learn: Yes  Cooperative: Yes  Receptive (willing/able to accept information): Yes  Any cultural factors/Worship beliefs that may influence understanding or compliance? No    Patient had vitals, height and weight done.  Consents explained and signed.  Calendar reviewed and questions were answered.  Labs drawn.  Urine collection device given to patient.    Instructional Materials Used/Given: Consents, calendar    Time spent with patient: 60 minutes.    Specific Concerns: NA

## 2021-06-04 NOTE — TELEPHONE ENCOUNTER
Called and discussed. He has nothing scheduled so I scheduled him for Thursday January 2 with Dr Castro

## 2021-06-04 NOTE — PROGRESS NOTES
[facility]  BMTCT OPEN VISIT    June 4, 2021        Juvenal Blake is a 55 year old male undergoing evaluation prior to hematopoietic cell transplant or immune effector cell therapy.    Reason for BMTCT: Follicular lymphoma initially diagnosed in 2010 and now has presented with fourth recurrence    Recent chemotherapy: His last therapy was Idelalisib , started October 2020 .  Still taking this, 150 mg oral bid    Recent infections: No recent infection.  No history of COVID    Blood thinner use? If yes, why? No    Treatment for diabetes? No          Today, the patient notes the following symptoms:  Review Of Systems  Skin: started with first trial of NK cells, splotchy skin on legs  Eyes: negative for, visual blurring, double vision, eye pain  Ears/Nose/Throat: no troubles with hearing, some teeth not in good repair but no tooth pain   Respiratory: occasional cough, sob with any exertion  Cardiovascular: No chest pain or palpitations  Gastrointestinal: Nausea, no emesis, sometimes loose stools 4-5 times day, sometimes hemorrhoids  Genitourinary: urination is very frequent, chronic, no hematuria, since prostrate cancer  Musculoskeletal: negative for, joint swelling and muscular weakness  Neurologic: no numbness or tingling in fingers or toes  Psychiatric: no previous issus  Hematologic/Lymphatic/Immunologic: No bleeding        Juvenal Blake's History    Past Medical History:   Diagnosis Date     Cancer (H)      Non Hodgkin's lymphoma (H)      Shortness of breath       Remarkable for [place] 7 high-risk prostate cancer with invasion of the capsule but negative nodes, for which he was treated with prostatectomy  history of hypogammaglobulinemia and has received intermittent doses of IVIG over the last year and a half or so  Arthritis  In shoulder   GERD  Pyloric stenosis  Inguinal hernia   Sleep apnea --CPAP        Treatment/Chemotherapy Number of Cycles Date Range Outcomes & Complications  "  Bendamustine/Rituxan 6  CR x 5-6 year resmission   Maintenance rituxan   [id number] Relapse in 2017   Bendamustine/Rituxan 6 Feb-2017 CR   Maintenance rituxan    through 2019 second relapse in 2019 via repeat biopsy   O-CHOP 6 Through end 2019 Progression 2020         Past Surgical History:   Procedure Laterality Date     HERNIA REPAIR, UMBILICAL       IR LYMPH NODE BIOPSY  10/1/2020     IR LYMPH NODE BIOPSY  2021       Family History   Problem Relation Age of Onset     Colon Cancer Mother      Lymphoma Father      No Known Problems Brother      No Known Problems Sister      No Known Problems Son      No Known Problems Sister      No Known Problems Son        Social History     Tobacco Use     Smoking status: Former Smoker     Quit date: 1995     Years since quittin.9     Smokeless tobacco: Never Used   Substance Use Topics     Alcohol use: Yes     Frequency: 4 or more times a week     Drinks per session: 3 or 4     He is retired for last 5 years has worked in [employer] industry.  He lives with his wife they live in [facility] Medications and Allergies    Current Outpatient Medications   Medication     allopurinol (ZYLOPRIM) 300 MG tablet     azelastine (ASTELIN) 0.1 % nasal spray     fluticasone (FLONASE) 50 MCG/ACT nasal spray     hydrOXYzine (ATARAX) 50 MG tablet     medical cannabis (Patient's own supply)     omeprazole (PRILOSEC) 20 MG DR capsule     Psyllium (METAMUCIL FIBER) 51.7 % PACK     tolterodine ER (DETROL LA) 4 MG 24 hr capsule     ZYDELIG 150 MG tablet     No current facility-administered medications for this visit.           Allergies   Allergen Reactions     Ondansetron Itching           Physical Examination  Vital Signs 2021   Systolic 135   Diastolic 79   Pulse 76   Temperature    Respirations 18   Weight (LB) 247 lb 1.6 oz   Height 5' 8.504\"   BMI (Calculated) 37.02   Pain    O2 100     Exam:  General: patient appears stated " age  Nontoxic and in no distress. HEENT: Head: atraumatic, normocephalic. Sclerae anicteric.  Chest: Normal respiratory effort.   Cardiac: No edema.   Abdomen: abdomen is non-distended  Extremities: normal tone and muscle bulk.  Skin: no lesions or rash. Warm and dry.   CNS: alert and oriented. Grossly non-focal.   Psychiatric: normal mood and affect.   Nodes: No palpable cervical or supraclavicular nodes.      Frailty Screening    1. Weight loss: Have you lost >10 pounds (or >5% body weight) unintentionally over the last year? No occasional      2. Exhaustion: How often in the past week did you feel that:  o I feel that everything I do is an effort : .Exhaustion: 1 = some of the time (1-2 days)  o I feel I cannot get going: Exhaustion: 1 = some of the time (1-2 days)    3. Weakness:  Hand  strength (measured by MA; calculate average): 38     Male BMI Frailty Criteria for  Male  Strength Female BMI Frailty Criteria for  Female  Strength   ?24 ?29 ?23 ?17   24.1 - 26 ?30 23.1 - 26 ?17.3   26.1 - 28 ?30 26.1 - 29 ?18   >28 ?32 >29 ?21     4. Slowness:  15 foot walk time (measured by MA):  5 sec    Height Frailty Criteria for  15 Foot Walk Time   Men   ?173 cm ? 7 seconds    >173 cm  ? 6 seconds   Women   ?159 cm ? 7 seconds   >159 cm  ? 6 seconds     5. Physical activity:     *Please complete 2 calculations for kcal (see frailty worksheet for equations)     Energy expenditure for frailty: 728 kcal expended per week     Gender Frailty Criteria for Low Physical Activity   Male <383 kcal/week   Female <270 kcal/weel     IPAQ score: 390 MET minutes per week       Juvenal Blake met the following criteria for prefrailty (score 1-2) or frailty (score 3+): Frailty: Exhaustion  Frailty Score is: 1      Additional assessments not to be used in frailty calculation:   What types of physical activity can you tolerate? Walking and yard work    Sit to stand test (time to complete 5 reps): 19 seconds    Standing balance  in 10 seconds:  Record the Total number of seconds(0-30)--add a+b+c ( take best attempt for each)    First attempt: 10 seconds    Second attempt: 10 seconds            Overall Assessment      I have reviewed the diagnostic data, medications, frailty screening, and general processes prior to BMTCT.  I have notified the Primary BMT Physician/and or Attending Physician in the clinic of any issues. We also discussed in detail the database and biorepository research for which Juvenal Rioschristiano is eligible. We discussed the potential risks and potential benefits of each of these protocols individually. We explained potential alternatives to the protocols discussed. We explained to the patient that participation is voluntary and that consent may be withdrawn at any time.       2 Consents received and  Signed:    Blood transfusion consent form    Ethnicity form--- not received    [facility] database-signed    Present during the discussion was Juvenal Blake Copies of the signed consent forms will be provided to the patient on admission. No procedures specific to any studies were performed prior to the patient signing the consent form.    Juvenal Rioschristiano had the opportunity to ask questions, and I answered all of the questions to the best of my ability.      Glory Covarrubias PA-C      30 minutes spent on the date of the encounter doing chart review, review of test results, interpretation of tests, patient visit and documentation

## 2021-06-04 NOTE — LETTER
6/4/2021         RE: Juvenal Blake  [address] [place] [address]        Dear Colleague,    Thank you for referring your patient, Juvenal Blake, to the [facility] [place]. Please see a copy of my visit note below.        [facility]  BMTCT OPEN VISIT    June 4, 2021        Juvenal Blake is a 55 year old male undergoing evaluation prior to hematopoietic cell transplant or immune effector cell therapy.    Reason for BMTCT: Follicular lymphoma initially diagnosed in 2010 and now has presented with fourth recurrence    Recent chemotherapy: His last therapy was Idelalisib , started October 2020 .  Still taking this, 150 mg oral bid    Recent infections: No recent infection.  No history of COVID    Blood thinner use? If yes, why? No    Treatment for diabetes? No          Today, the patient notes the following symptoms:  Review Of Systems  Skin: started with first trial of NK cells, splotchy skin on legs  Eyes: negative for, visual blurring, double vision, eye pain  Ears/Nose/Throat: no troubles with hearing, some teeth not in good repair but no tooth pain   Respiratory: occasional cough, sob with any exertion  Cardiovascular: No chest pain or palpitations  Gastrointestinal: Nausea, no emesis, sometimes loose stools 4-5 times day, sometimes hemorrhoids  Genitourinary: urination is very frequent, chronic, no hematuria, since prostrate cancer  Musculoskeletal: negative for, joint swelling and muscular weakness  Neurologic: no numbness or tingling in fingers or toes  Psychiatric: no previous issus  Hematologic/Lymphatic/Immunologic: No bleeding        Juvenal Blake's History    Past Medical History:   Diagnosis Date     Cancer (H)      Non Hodgkin's lymphoma (H)      Shortness of breath       Remarkable for [place] 7 high-risk prostate cancer with invasion of the capsule but negative nodes, for which he was treated with prostatectomy  history of  hypogammaglobulinemia and has received intermittent doses of IVIG over the last year and a half or so  Arthritis  In shoulder   GERD  Pyloric stenosis  Inguinal hernia   Sleep apnea --CPAP        Treatment/Chemotherapy Number of Cycles Date Range Outcomes & Complications   Bendamustine/Rituxan 2010 CR x 5-6 year resmission   Maintenance rituxan   [id number] Relapse in 2017   Bendamustine/Rituxan 6 Feb-2017 CR   Maintenance rituxan    through 2019 second relapse in 2019 via repeat biopsy   O-CHOP 6 Through 2019 Progression 2020         Past Surgical History:   Procedure Laterality Date     HERNIA REPAIR, UMBILICAL       IR LYMPH NODE BIOPSY  10/1/2020     IR LYMPH NODE BIOPSY  2021       Family History   Problem Relation Age of Onset     Colon Cancer Mother      Lymphoma Father      No Known Problems Brother      No Known Problems Sister      No Known Problems Son      No Known Problems Sister      No Known Problems Son        Social History     Tobacco Use     Smoking status: Former Smoker     Quit date: 1995     Years since quittin.9     Smokeless tobacco: Never Used   Substance Use Topics     Alcohol use: Yes     Frequency: 4 or more times a week     Drinks per session: 3 or 4     He is retired for last 5 years has worked in [employer] industry.  He lives with his wife they live in [facility] Medications and Allergies    Current Outpatient Medications   Medication     allopurinol (ZYLOPRIM) 300 MG tablet     azelastine (ASTELIN) 0.1 % nasal spray     fluticasone (FLONASE) 50 MCG/ACT nasal spray     hydrOXYzine (ATARAX) 50 MG tablet     medical cannabis (Patient's own supply)     omeprazole (PRILOSEC) 20 MG DR capsule     Psyllium (METAMUCIL FIBER) 51.7 % PACK     tolterodine ER (DETROL LA) 4 MG 24 hr capsule     ZYDELIG 150 MG tablet     No current facility-administered medications for this visit.           Allergies   Allergen Reactions      "Ondansetron Itching           Physical Examination  Vital Signs 6/4/2021   Systolic 135   Diastolic 79   Pulse 76   Temperature    Respirations 18   Weight (LB) 247 lb 1.6 oz   Height 5' 8.504\"   BMI (Calculated) 37.02   Pain    O2 100     Exam:  General: patient appears stated age  Nontoxic and in no distress. HEENT: Head: atraumatic, normocephalic. Sclerae anicteric.  Chest: Normal respiratory effort.   Cardiac: No edema.   Abdomen: abdomen is non-distended  Extremities: normal tone and muscle bulk.  Skin: no lesions or rash. Warm and dry.   CNS: alert and oriented. Grossly non-focal.   Psychiatric: normal mood and affect.   Nodes: No palpable cervical or supraclavicular nodes.      Frailty Screening    1. Weight loss: Have you lost >10 pounds (or >5% body weight) unintentionally over the last year? No occasional      2. Exhaustion: How often in the past week did you feel that:  o I feel that everything I do is an effort : .Exhaustion: 1 = some of the time (1-2 days)  o I feel I cannot get going: Exhaustion: 1 = some of the time (1-2 days)    3. Weakness:  Hand  strength (measured by MA; calculate average): 38     Male BMI Frailty Criteria for  Male  Strength Female BMI Frailty Criteria for  Female  Strength   ?24 ?29 ?23 ?17   24.1 - 26 ?30 23.1 - 26 ?17.3   26.1 - 28 ?30 26.1 - 29 ?18   >28 ?32 >29 ?21     4. Slowness:  15 foot walk time (measured by MA):  5 sec    Height Frailty Criteria for  15 Foot Walk Time   Men   ?173 cm ? 7 seconds    >173 cm  ? 6 seconds   Women   ?159 cm ? 7 seconds   >159 cm  ? 6 seconds     5. Physical activity:     *Please complete 2 calculations for kcal (see frailty worksheet for equations)     Energy expenditure for frailty: 728 kcal expended per week     Gender Frailty Criteria for Low Physical Activity   Male <383 kcal/week   Female <270 kcal/weel     IPAQ score: 390 MET minutes per week       Juvenal Blake met the following criteria for prefrailty (score 1-2) or " frailty (score 3+): Frailty: Exhaustion  Frailty Score is: 1      Additional assessments not to be used in frailty calculation:   What types of physical activity can you tolerate? Walking and yard work    Sit to stand test (time to complete 5 reps): 19 seconds    Standing balance in 10 seconds:  Record the Total number of seconds(0-30)--add a+b+c ( take best attempt for each)    First attempt: 10 seconds    Second attempt: 10 seconds            Overall Assessment      I have reviewed the diagnostic data, medications, frailty screening, and general processes prior to BMTCT.  I have notified the Primary BMT Physician/and or Attending Physician in the clinic of any issues. We also discussed in detail the database and biorepository research for which Juvenal Blake is eligible. We discussed the potential risks and potential benefits of each of these protocols individually. We explained potential alternatives to the protocols discussed. We explained to the patient that participation is voluntary and that consent may be withdrawn at any time.       2 Consents received and  Signed:    Blood transfusion consent form    Ethnicity form--- not received    [facility] database-signed    Present during the discussion was Juvenal Blake Copies of the signed consent forms will be provided to the patient on admission. No procedures specific to any studies were performed prior to the patient signing the consent form.    Juvenal Blake had the opportunity to ask questions, and I answered all of the questions to the best of my ability.      Glory Covarrubias PA-C      30 minutes spent on the date of the encounter doing chart review, review of test results, interpretation of tests, patient visit and documentation         Again, thank you for allowing me to participate in the care of your patient.        Sincerely,        BMT Advanced Practice Provider

## 2021-06-04 NOTE — PROGRESS NOTES
RESPIRATORY CARE NOTE     Patient Name: Juvenal Blake  Today's Date: 12/23/2019     Complete PFT done. Pt performed tests with good effort. Test results meet ATS criteria. Results scanned into epic. Pt left in no distress.       Abigail Rodrigues, MARISOLT

## 2021-06-04 NOTE — PROGRESS NOTES
Chief complaint: Follow-up cough    History of present illness: This is a pleasant 54-year-old gentleman I saw over a month ago now for cough as well as immunodeficiency.  He was status post chemotherapy at the time.  He had received IVIG.  This had improved his symptoms.  However, he did have an allergic component to dust mite as well as a cough consistent with possibly asthma.  We trialed Dulera for a month.  He was healthy for approximately 3 or 4 weeks but then about a week ago came down with another cold and cough.  He feels a lot of congestion in his chest.  He was seen by entire urgent care last week.  He was given doxycycline.  He is on day 7 or 8.  Reports he is feeling slightly better but still has a significant cough.  He thinks he had his immunoglobulin levels drawn last week but I do not have access to this information.    Past medical history, social history, family medical history, meds and allergies reviewed and updated accordingly.    Review of Systems performed as above and the remainder is negative.        Current Outpatient Medications:      albuterol (PROAIR HFA;PROVENTIL HFA;VENTOLIN HFA) 90 mcg/actuation inhaler, Inhale 2 puffs every 6 (six) hours as needed for wheezing., Disp: , Rfl:      azelastine (ASTELIN) 137 mcg (0.1 %) nasal spray, 1 spray into each nostril 2 (two) times a day., Disp: , Rfl: 11     beclomethasone (QVAR) 80 mcg/actuation inhaler, Inhale 1 puff 2 (two) times a day as needed.    , Disp: , Rfl:      doxycycline (VIBRAMYCIN) 100 MG capsule, TK 1 C BID TO TREAT INFECTION FOR 10 DAYS, Disp: , Rfl: 1     fluticasone propionate (FLONASE) 50 mcg/actuation nasal spray, , Disp: , Rfl:      lidocaine-prilocaine (EMLA) cream, Apply to port 1/2 to 1 hour prior to accessing., Disp: 30 g, Rfl: 2     mometasone-formoterol (DULERA) 200-5 mcg/actuation HFAA inhaler, Inhale 2 puffs 2 (two) times a day., Disp: 1 Inhaler, Rfl: 1     montelukast (SINGULAIR) 10 mg tablet, Take 1 tablet (10 mg  "total) by mouth at bedtime., Disp: 30 tablet, Rfl: 1     omeprazole (PRILOSEC) 20 MG capsule, Take 1 capsule (20 mg total) by mouth 2 (two) times a day before meals. (Patient taking differently: Take 40 mg by mouth 2 (two) times a day before meals.    ), Disp: 60 capsule, Rfl: 2     psyllium (METAMUCIL) 3.4 gram packet, Take 1 packet by mouth daily., Disp: , Rfl:      tolterodine (DETROL LA) 4 MG ER capsule, Take 4 mg by mouth daily., Disp: , Rfl: 3     VIRTUSSIN AC  mg/5 mL liquid, TK 10 ML PO EVERY 4 HOURS AS NEEDED FOR COUGH., Disp: , Rfl: 0     naproxen (NAPROSYN) 500 MG tablet, Take 500 mg by mouth as needed., Disp: , Rfl: 2  No current facility-administered medications for this visit.     Facility-Administered Medications Ordered in Other Visits:      HYDROcodone-acetaminophen  mg per tablet 1-2 tablet (NORCO ), 1-2 tablet, Oral, Q6H PRN, Itzel Edwards, CNS     naloxone injection 0.2-0.4 mg (NARCAN), 0.2-0.4 mg, Intravenous, PRN **OR** naloxone injection 0.2-0.4 mg (NARCAN), 0.2-0.4 mg, Intramuscular, PRN, Itzel Edwards, CNS    Allergies   Allergen Reactions     Zofran (As Hydrochloride) [Ondansetron Hcl] Itching       /68   Ht 5' 8.5\" (1.74 m)   Wt (!) 239 lb (108.4 kg)   BMI 35.81 kg/m    Gen: Pleasant male not in acute distress  HEENT: Eyes no erythema of the bulbar or palpebral conjunctiva, no edema. Ears: TMs well visualized, no effusions. Nose: No congestion, mucosa normal. Mouth: Throat clear, no lip or tongue edema.   Cardiac: Regular rate and rhythm, no murmurs, rubs or gallops  Respiratory: Crackles at the bases bilaterally  Skin: No rashes or lesions  Psych: Alert and oriented times 3    Impression report and plan:  1.  Cough    Okay to stop Dulera.  Reviewed his pulmonary function test which looked normal as well.  He did not respond to the inhaled corticosteroid as I would have hoped.  I think okay to stop montelukast as well but he does have dust mite allergy.  I " would like to obtain his immunoglobulin levels.  Stated that I do not manage IVIG.  For this reason, if he continues to need IVIG or work-up further immunodeficiency, I recommend [facility].  I would like to obtain a chest x-ray given his exam today.

## 2021-06-04 NOTE — TELEPHONE ENCOUNTER
Spoke to Juvenal and let him know his chest xray was clear. He has no questions on that. I also told him Dr Castro would be sending a message to Dr Crooks regarding his being sick repeatedly. He may want to order some IgE

## 2021-06-05 VITALS
DIASTOLIC BLOOD PRESSURE: 87 MMHG | TEMPERATURE: 98.7 F | OXYGEN SATURATION: 98 % | HEART RATE: 78 BPM | BODY MASS INDEX: 36.78 KG/M2 | SYSTOLIC BLOOD PRESSURE: 149 MMHG | RESPIRATION RATE: 18 BRPM | WEIGHT: 241.9 LBS

## 2021-06-05 VITALS
HEART RATE: 78 BPM | WEIGHT: 236.38 LBS | TEMPERATURE: 98.9 F | BODY MASS INDEX: 35.94 KG/M2 | DIASTOLIC BLOOD PRESSURE: 76 MMHG | SYSTOLIC BLOOD PRESSURE: 135 MMHG | OXYGEN SATURATION: 96 % | RESPIRATION RATE: 18 BRPM

## 2021-06-05 VITALS
SYSTOLIC BLOOD PRESSURE: 187 MMHG | WEIGHT: 236.5 LBS | HEART RATE: 63 BPM | HEIGHT: 68 IN | BODY MASS INDEX: 35.84 KG/M2 | OXYGEN SATURATION: 99 % | DIASTOLIC BLOOD PRESSURE: 98 MMHG

## 2021-06-05 VITALS
TEMPERATURE: 98.3 F | SYSTOLIC BLOOD PRESSURE: 142 MMHG | BODY MASS INDEX: 36.8 KG/M2 | DIASTOLIC BLOOD PRESSURE: 74 MMHG | WEIGHT: 242 LBS | OXYGEN SATURATION: 97 % | HEART RATE: 69 BPM

## 2021-06-05 VITALS
SYSTOLIC BLOOD PRESSURE: 129 MMHG | TEMPERATURE: 98.2 F | HEART RATE: 72 BPM | BODY MASS INDEX: 37.25 KG/M2 | DIASTOLIC BLOOD PRESSURE: 76 MMHG | WEIGHT: 245 LBS | RESPIRATION RATE: 18 BRPM | OXYGEN SATURATION: 97 %

## 2021-06-05 VITALS
SYSTOLIC BLOOD PRESSURE: 145 MMHG | TEMPERATURE: 98.6 F | DIASTOLIC BLOOD PRESSURE: 79 MMHG | HEART RATE: 66 BPM | WEIGHT: 254.38 LBS | OXYGEN SATURATION: 98 % | BODY MASS INDEX: 38.68 KG/M2

## 2021-06-05 VITALS — BODY MASS INDEX: 35.73 KG/M2 | WEIGHT: 235 LBS

## 2021-06-05 LAB — HTLV I+II AB PATRN SER IB-IMP: NEGATIVE

## 2021-06-05 NOTE — PROGRESS NOTES
[facility] Hematology and Oncology Progress Note    Patient: Juvenal Blake  MRN: [id number]  Date of Service: January 7, 2020      Assessment and Plan:    1.  Follicular lymphoma: Imaging was reviewed.  He has no adenopathy in the neck, chest, abdomen, or pelvis.  He remains in remission.  Chemotherapy side effects have resolved.  Repeat imaging in 3 months.  If remains stable then we can consider moving scan interval to 4 to 6 months.    2.  Prostate cancer: He finished radiation about 20 months ago.  His PSA today is 0.2.  His PSA has been stable for 8 months at this level.  Technically this does not define PSA recurrence.  We will continue to check his PSA every 3 months and proceed with further work-up for metastases if he has a rise in his PSA on 2 separate occasions.    3.  Hematuria: He is being treated for urinary tract infection although his urinalysis from December 20 did not appear infected..  Urine culture at that time grew 10-50,000 Proteus, pansensitive.  We will touch base with Dr. Reynoso to see if he would like to do any further investigations.  Hematuria amount and frequency is  Improving.    4.  Immunodeficiency: He has had respiratory tract symptoms now for many months.  He is finishing up a course of doxycycline and Augmentin.  I am going to give him monthly doses of IVIG for 6 months to see if this improves his symptoms.  He has recently been seen by pulmonology and allergy.    ECOG Performance   ECOG Performance Status: 0    Distress Assessment  Distress Assessment Score: 1June 28, 2019    Pain  Currently in Pain: No/denies    Diagnosis:    1.  Follicular lymphoma: Diagnosed in April, 2010 . Stage IV diagnosis with bone marrow involvement.  Initial disease involved the cervical, supraclavicular, axillary, mesenteric mass, retroperitoneal nodes, and possibly the manubrium.  Relapse noted on imaging in December 2016.  Second relapse on imaging January 2019.  Repeat biopsy January 7, 2019 shows  recurrent low-grade follicular lymphoma.  Grade 2.    2.  Prostate cancer:  Pathologic stage T3b prostate cancer.  Positive margins, multiple, and surgery.  3 lymph nodes were negative.  High risk for recurrent disease.  He is being followed at [facility].  [place] 4+3 = 7.  Tertiary [place] pattern 5 and preoperative PSA of 27.     3.  Hypogammaglobulinemia: He has received intermittent doses of IVIG.    Treatment:    Lymphoma:    He had 6 cycles of bendamustine and Rituxan completed in October, 2010.  He had 2 years of maintenance Rituxan therapy finishing in September, 2012.  Second course of bendamustine plus rituximab started February 20, 2017.  Cycle 6 was completed on July 11, 2017.  Then had maintenance rituximab.    O-CHOP started at second relapse.  Cycle 1 given April 23, 2019.  Cycle 6 given August 16, 2019.    Prostate:   Initial radical prostatectomy and bilateral lymph node dissection.  November 15, 2017.  Prostatic adenocarcinoma [place] 4+3 = 7 with tertiary pattern 5.  Tumor involves 45% of total surface area.  Positive for extensive extraprostatic extension.  Positive for bilateral seminal vesicle invasion.  Multiple margins positive.  Lymphovascular invasion not identified.  Lymph nodes negative. 3 were examined.  Completed radiation at [facility] early May 2018    Interim History:    Juvenal returns today for follow-up visit.  He was last seen about 3 months ago.  He is on another course of doxycycline and Augmentin for URI symptoms.  Does not seem to be improving much.  No headaches.  About a week ago he had some gross hematuria.  Significant red blood in the urine.  No pelvic pain.  Hematuria is improving.  No fevers.     Review of Systems:    Constitutional  Constitutional (WDL): Exceptions to WDL  Fatigue: Fatigue relieved by rest  Neurosensory  Neurosensory (WDL): All neurosensory elements are within defined limits  Cardiovascular  Cardiovascular (WDL): Exceptions to  WDL  Edema: Yes  Pulmonary  Respiratory (WDL): Exceptions to WDL  Cough: Mild symptoms, nonprescription intervention indicated  Dyspnea: Shortness of breath with moderate exertion  Gastrointestinal  Gastrointestinal (WDL): All gastrointestinal elements are within defined limits  Genitourinary  Genitourinary (WDL): Exceptions to WDL(reports blood in urine)  Integumentary  Integumentary (WDL): All integumentary elements are within defined limits  Patient Coping  Patient Coping: Accepting  Accompanied by  Accompanied by: Family Member(wife)    Past History:    Past Medical History:   Diagnosis Date     Arthritis     shoulder per H & P     GERD (gastroesophageal reflux disease)      H/O pyloric stenosis     as an infant per H & P      Inguinal hernia     per H & P      Lazy eye     per H & P      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 Nontoxic and in no distress.   HEENT: Head: atraumatic, normocephalic. Sclerae anicteric.  Chest:  Normal respiratory effort.   Cardiac: No edema.   Abdomen: abdomen is soft, non-distended  Extremities: normal tone and muscle bulk.   Skin: no lesions or rash.   CNS: alert and oriented. Grossly non-focal.   Psychiatric: normal mood and affect.     Lab Results:    Recent Results (from the past 240 hour(s))   POCT CREATININE    Collection Time: 01/06/20 11:50 AM   Result Value Ref Range    iSTAT Creatinine 1.0 0.7 - 1.3 mg/dL    iSTAT GFR MDRD Af Amer >60 >60 mL/min/1.73m2    iSTAT GFR MDRD Non Af Amer >60 >60 mL/min/1.73m2   Comprehensive Metabolic Panel    Collection Time: 01/07/20  2:27 PM   Result Value Ref Range    Sodium 141 136 - 145 mmol/L    Potassium  4.0 3.5 - 5.0 mmol/L    Chloride 103 98 - 107 mmol/L    CO2 30 22 - 31 mmol/L    Anion Gap, Calculation 8 5 - 18 mmol/L    Glucose 70 70 - 125 mg/dL    BUN 16 8 - 22 mg/dL    Creatinine 1.15 0.70 - 1.30 mg/dL    GFR MDRD Af Amer >60 >60 mL/min/1.73m2    GFR MDRD Non Af Amer >60 >60 mL/min/1.73m2    Bilirubin, Total 0.6 0.0 - 1.0 mg/dL    Calcium 9.5 8.5 - 10.5 mg/dL    Protein, Total 6.9 6.0 - 8.0 g/dL    Albumin 4.1 3.5 - 5.0 g/dL    Alkaline Phosphatase 77 45 - 120 U/L    AST 28 0 - 40 U/L    ALT 63 (H) 0 - 45 U/L   PSA, Diagnostic (Prostatic-Specific Antigen)    Collection Time: 01/07/20  2:27 PM   Result Value Ref Range    PSA 0.2 0.0 - 3.5 ng/mL   HM1 (CBC with Diff)    Collection Time: 01/07/20  2:27 PM   Result Value Ref Range    WBC 6.1 4.0 - 11.0 thou/uL    RBC 4.51 4.40 - 6.20 mill/uL    Hemoglobin 13.8 (L) 14.0 - 18.0 g/dL    Hematocrit 41.6 40.0 - 54.0 %    MCV 92 80 - 100 fL    MCH 30.6 27.0 - 34.0 pg    MCHC 33.2 32.0 - 36.0 g/dL    RDW 15.4 (H) 11.0 - 14.5 %    Platelets 155 140 - 440 thou/uL    MPV 9.1 8.5 - 12.5 fL    Neutrophils % 76 (H) 50 - 70 %    Lymphocytes % 11 (L) 20 - 40 %    Monocytes % 11 (H) 2 - 10 %    Eosinophils % 1 0 - 6 %    Basophils % 0 0 - 2 %    Neutrophils Absolute 4.7 2.0 - 7.7 thou/uL    Lymphocytes Absolute 0.7 (L) 0.8 - 4.4 thou/uL    Monocytes Absolute 0.7 0.0 - 0.9 thou/uL    Eosinophils Absolute 0.1 0.0 - 0.4 thou/uL    Basophils Absolute 0.0 0.0 - 0.2 thou/uL     Imaging:    CT scan images were reviewed.  Show complete remission.  No evidence of lymphoma.    Ct Soft Tissue Neck With Contrast    Result Date: 1/6/2020  EXAM: CT SOFT TISSUE NECK W CONTRAST LOCATION: [facility] DATE/TIME: 1/6/2020 12:18 PM INDICATION: Neck mass, multiple (Age > 15y) lymphoma f/u. COMPARISON: None. CONTRAST: Iohexol (Omni) 100 mL TECHNIQUE: Routine with IV contrast. Multiplanar reformats. Dose reduction techniques were used. FINDINGS: VISUALIZED INTRACRANIAL/ORBITS/SINUSES: No  abnormality of the visualized intracranial compartment or orbits. Mild nonaggressive mucosal thickening within the maxillary sinuses. SUPRAHYOID NECK: Normal nasopharynx and oropharynx. Normal parapharyngeal and  spaces. Normal oral cavity and floor of mouth structures. INFRAHYOID NECK: Normal supraglottic larynx, vocal cords, and hypopharynx. SALIVARY GLANDS: Normal parotid and submandibular glands. LYMPH NODES: No pathologic lymph nodes by size or morphology criteria. VESSELS: Vascular structures of the neck are patent. THYROID: Normal. OTHER: No destructive osseous lesion. The included lung apices are clear. Right chest port.     1.  No enlarged cervical lymph nodes or findings to suggest active disease. 2.  Mild inflammatory mucosal thickening within the maxillary sinuses.    Ct Chest Abdomen Pelvis Without Oral With Iv Contrast    Result Date: 1/6/2020  EXAM: CT CHEST ABDOMEN PELVIS WO ORAL W IV CONTRAST LOCATION: [facility] DATE/TIME: 1/6/2020 12:22 PM INDICATION: Follow-up lymphoma and adenopathy. COMPARISON: Chest CT 01/20/2019. PET/CT 09/11/2019. TECHNIQUE: CT scan of the chest, abdomen, and pelvis was performed following injection of IV contrast. Multiplanar reformats were obtained. Dose reduction techniques were used. CONTRAST: Iohexol (Omni) 100 mL. FINDINGS: LUNGS AND PLEURA: Minimal emphysema. No consolidation or suspicious nodule. Negative pleural spaces. MEDIASTINUM/AXILLAE: No adenopathy. Portacatheter. HEPATOBILIARY: Hepatic steatosis without focal abnormality. PANCREAS: Normal. SPLEEN: Not significantly enlarged. ADRENAL GLANDS: Normal. KIDNEYS/BLADDER: Renal cysts. No hydronephrosis. Decompressed bladder. BOWEL: Unremarkable. LYMPH NODES: Hazy density in the central mesentery and retroperitoneum without significant change. No new or enlarging abdominal-pelvic adenopathy. Stable 9 x 12 mm central mesenteric node (series 5, image 358). VASCULATURE: Nonaneurysmal aorta. PELVIC  ORGANS: No acute abnormality. OTHER: Small fat-containing umbilical hernia. MUSCULOSKELETAL: No focal bony lesion.     1.  No significant change. No new or enlarging adenopathy.       Signed by: Rob Crooks MD  January 7, 2020

## 2021-06-05 NOTE — TELEPHONE ENCOUNTER
Called and LM we can fill if he is wanting to resume, also ask if he has started the Dulera too. Needs PA if he does

## 2021-06-05 NOTE — TELEPHONE ENCOUNTER
Prior Authorization Request  Who s requesting:  insurance  Pharmacy Name and Location: [facility]   Medication Name: Dulera  Insurance Plan: [employer]  Insurance Member ID Number:  [id number]  CoverMyMeds Key: [id number]  Informed patient that prior authorizations can take up to 10 business days for response:   Yes  Okay to leave a detailed message: Yes

## 2021-06-06 NOTE — PROGRESS NOTES
GENERAL SURGICAL CONSULTATION    I was requested by Cole Sandoval MD to consult on this pt to evaluate them for     HPI:  This is a 54 y.o. male here today with swelling at the Umbilicus over the last 3 years. It is painful when he leans against the swelling.  He has had episodes when the tissue does not want to go back in.  He has not had any GI symptoms with this yet except the occasional nausea when the hernia protrudes out.    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)      Low serum IgG for age      Non Hodgkin's lymphoma (H)      Prostate CA (H)      Sleep apnea     CPAP       Past Surgical History:   Procedure Laterality Date     ABDOMINAL SURGERY      for pyloric stenosis as an infant     COLONOSCOPY       DISTAL CLAVICLE EXCISION      per H & P      HERNIA REPAIR      inguinal     port a cath placement  01/2017     [place] LAP,PROSTATECTOMY,RADICAL,W/NERVE SPARE,INCL ROBOTIC N/A 11/15/2017    Procedure: ROBOTIC ASSISTED RADICAL RETROPUBIC PROSTATECTOMY BILATERAL PELVIC LYMPH NODE DISSECTION ;  Surgeon: Ezio Steele MD;  Location: [facility];  Service: Urology     shoulder arthroscopy Left      TONSILLECTOMY       US LYMPH NODE BIOPSY  1/7/2019       CURRENT MEDS:  Current Outpatient Medications   Medication Sig Dispense Refill     albuterol (PROAIR HFA;PROVENTIL HFA;VENTOLIN HFA) 90 mcg/actuation inhaler Inhale 2 puffs every 6 (six) hours as needed for wheezing.       azelastine (ASTELIN) 137 mcg (0.1 %) nasal spray 1 spray into each nostril 2 (two) times a day.  11     beclomethasone (QVAR) 80 mcg/actuation inhaler Inhale 1 puff 2 (two) times a day as needed.              fluticasone propionate (FLONASE)  50 mcg/actuation nasal spray        lidocaine-prilocaine (EMLA) cream Apply to port 1/2 to 1 hour prior to accessing. 30 g 2     methylPREDNISolone (MEDROL DOSEPACK) 4 mg tablet        multivitamin (DAILY-ROSARIO) per tablet 1 tab(s) orally once a day       naproxen (NAPROSYN) 500 MG tablet Take 500 mg by mouth as needed.  2     omeprazole (PRILOSEC) 20 MG capsule Take 2 capsules (40 mg total) by mouth 2 (two) times a day before meals. 60 capsule 2     psyllium (METAMUCIL) 3.4 gram packet Take 1 packet by mouth daily.       tolterodine (DETROL LA) 4 MG ER capsule Take 4 mg by mouth daily.  3     No current facility-administered medications for this visit.      Facility-Administered Medications Ordered in Other Visits   Medication Dose Route Frequency Provider Last Rate Last Dose     HYDROcodone-acetaminophen  mg per tablet 1-2 tablet (NORCO )  1-2 tablet Oral Q6H PRN Itzel Edwards, CNS         naloxone injection 0.2-0.4 mg (NARCAN)  0.2-0.4 mg Intravenous PRN Itzel Edwards, CNS        Or     naloxone injection 0.2-0.4 mg (NARCAN)  0.2-0.4 mg Intramuscular PRN Itzel Edwards, CNS           Family History   Problem Relation Age of Onset     Colon cancer Mother         diagnosed in her late 40s     Lymphoma Father         non-Hodgkins lymphoma, diagnosed 70s     No Medical Problems Sister      No Medical Problems Brother      No Medical Problems Son      No Medical Problems Sister      No Medical Problems Son      Lung cancer Maternal Grandmother      Lung cancer Maternal Uncle      Family history is not pertinent to this patients Chief Complaint.     reports that he quit smoking about 24 years ago. He has a 20.00 pack-year smoking history. He has never used smokeless tobacco. He reports current alcohol use of about 10.0 standard drinks of alcohol per week. He reports that he does not use drugs.    Review of Systems -   10 point Review of systems is negative except for; as mentioned above in HPI and  PMHx    /72 (Patient Site: Right Arm, Patient Position: Sitting, Cuff Size: Adult Regular)   Pulse 62   Wt (!) 236 lb (107 kg)   SpO2 97%   BMI 35.11 kg/m    Body mass index is 35.11 kg/m .    EXAM:  GENERAL: Well developed male, overweight  HEENT: EOMI, Anicteric Sclera, Moist Mucous Membranes,  In Mouth the pt does not have redness or bleeding gums  CARDIOVASCULAR: RRR w/out murmur   CHEST/LUNG: Clear to Auscultation  ABDOMEN:  Non tender to palpation, +BS, 2 cm Umb Hernia defect  MUSCULOSKELETAL:  No deformities with good range of motion in all extremities  NEURO: He is ambulatory with good strength in both legs.  HEME/LYMPH: No Cervical Adenopathy or tenderness.     IMAGES:  none    Assessment/Plan:  Umbilical hernia.  This could be treated with an open or laparoscopic hernia repair.    Laparoscopic UHR      Rob Farrell MD  [facility]  [phone]

## 2021-06-06 NOTE — PROGRESS NOTES
"Pulmonary Clinic Follow-up Visit    Assessment and Plan:   54 year old man with history of low-grade follicular lymphoma with progression, currently on CHOP chemotherapy, prostate cancer, GERD, JAZLYN currently using CPAP, chronic sinusitis, presenting for follow up of chronic cough. His cough has improved with antibiotics and IVIg treatment. He is feeling well today and lung exam is normal. PFTs and HRCT were both unremarkable. I do not think he has any significant underlying lung disease contributing to his cough and he did not have any symptomatic benefit from ICS therapy. The cough may be due to chronic sinus disease with possible contribution from GERD.      Recommendations:  - continue albuterol as needed. Off ICS at this point.   - continue GERD treatment with PPI two times a day   - continue IVIg per Dr. Crooks  - continue to follow up with allergy immunology. Repeat Ig levels at some point.  - continue follow up with [facility] for chronic sinus disease  - continue the flonase, astelin nasal sprays per ENT  - continue CPAP for JAZLYN and follow up with his sleep medicine team.  - UTD with flu shot and PSV13. Should have PSV23 in 1 year.      Follow up in 6 months.      Carlos New MD (Avi)  [facility] Pulmonary & Critical Care  Pager [phone]  Clinic [phone]      CCx: cough follow up    HPI: Interim history: I last saw Juvenal on 11/18/2019. Since that time, he was seen by allergy and oncology. He continues on monthly IVIg infusions through Dr. Crooks.  HRCT did not show any significant underlying lung disease.   He is not taking qvar or dulera consistently.  He has chronic SAMS that is stable and does not limit him. He is able to carry out most ADLs and exercise from time to time but says he couldn't \"run a marathon.\"  Minimal cough, feels the IVIg is helping.   He uses the albuterol rarely.     ROS:  A 12-system review was obtained and was negative with the exception of the " symptoms endorsed in the history of present illness.    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)      Low serum IgG for age      Non Hodgkin's lymphoma (H)      Prostate CA (H)      Sleep apnea     CPAP       PSH:  Past Surgical History:   Procedure Laterality Date     ABDOMINAL SURGERY      for pyloric stenosis as an infant     COLONOSCOPY       DISTAL CLAVICLE EXCISION      per H & P      HERNIA REPAIR      inguinal     port a cath placement  01/2017     [place] LAP,PROSTATECTOMY,RADICAL,W/NERVE SPARE,INCL ROBOTIC N/A 11/15/2017    Procedure: ROBOTIC ASSISTED RADICAL RETROPUBIC PROSTATECTOMY BILATERAL PELVIC LYMPH NODE DISSECTION ;  Surgeon: Ezio Steele MD;  Location: [facility];  Service: Urology     shoulder arthroscopy Left      TONSILLECTOMY       US LYMPH NODE BIOPSY  1/7/2019       Allergies:  Allergies   Allergen Reactions     Zofran (As Hydrochloride) [Ondansetron Hcl] Itching       Family HX:  Family History   Problem Relation Age of Onset     Colon cancer Mother         diagnosed in her late 40s     Lymphoma Father         non-Hodgkins lymphoma, diagnosed 70s     No Medical Problems Sister      No Medical Problems Brother      No Medical Problems Son      No Medical Problems Sister      No Medical Problems Son      Lung cancer Maternal Grandmother      Lung cancer Maternal Uncle        Social Hx:  Social History     Socioeconomic History     Marital status:      Spouse name: Not on file     Number of children: Not on file     Years of education: Not on file     Highest education level: Not on file   Occupational History     Not on file   Social Needs     Financial resource strain: Not on file     Food insecurity:     Worry: Not on file      Inability: Not on file     Transportation needs:     Medical: Not on file     Non-medical: Not on file   Tobacco Use     Smoking status: Former Smoker     Packs/day: 1.00     Years: 20.00     Pack years: 20.00     Last attempt to quit:      Years since quittin.1     Smokeless tobacco: Never Used   Substance and Sexual Activity     Alcohol use: Yes     Alcohol/week: 10.0 standard drinks     Types: 10 Cans of beer per week     Drug use: No     Types: Marijuana     Comment: none in the past 2 years     Sexual activity: Never   Lifestyle     Physical activity:     Days per week: Not on file     Minutes per session: Not on file     Stress: Not on file   Relationships     Social connections:     Talks on phone: Not on file     Gets together: Not on file     Attends Congregation service: Not on file     Active member of club or organization: Not on file     Attends meetings of clubs or organizations: Not on file     Relationship status: Not on file     Intimate partner violence:     Fear of current or ex partner: Not on file     Emotionally abused: Not on file     Physically abused: Not on file     Forced sexual activity: Not on file   Other Topics Concern     Not on file   Social History Narrative     Not on file       Current Meds:  Current Outpatient Medications   Medication Sig Dispense Refill     albuterol (PROAIR HFA;PROVENTIL HFA;VENTOLIN HFA) 90 mcg/actuation inhaler Inhale 2 puffs every 6 (six) hours as needed for wheezing.       azelastine (ASTELIN) 137 mcg (0.1 %) nasal spray 1 spray into each nostril 2 (two) times a day.  11     beclomethasone (QVAR) 80 mcg/actuation inhaler Inhale 1 puff 2 (two) times a day as needed.              doxycycline (VIBRAMYCIN) 100 MG capsule TK 1 C BID TO TREAT INFECTION FOR 10 DAYS  1     fluticasone propionate (FLONASE) 50 mcg/actuation nasal spray        lidocaine-prilocaine (EMLA) cream Apply to port 1/2 to 1 hour prior to accessing. 30 g 2     naproxen (NAPROSYN) 500 MG  "tablet Take 500 mg by mouth as needed.  2     omeprazole (PRILOSEC) 20 MG capsule Take 2 capsules (40 mg total) by mouth 2 (two) times a day before meals. 60 capsule 2     psyllium (METAMUCIL) 3.4 gram packet Take 1 packet by mouth daily.       tolterodine (DETROL LA) 4 MG ER capsule Take 4 mg by mouth daily.  3     VIRTUSSIN AC  mg/5 mL liquid TK 10 ML PO EVERY 4 HOURS AS NEEDED FOR COUGH.  0     No current facility-administered medications for this visit.      Facility-Administered Medications Ordered in Other Visits   Medication Dose Route Frequency Provider Last Rate Last Dose     HYDROcodone-acetaminophen  mg per tablet 1-2 tablet (NORCO )  1-2 tablet Oral Q6H PRN Itzel Edwards, CNS         naloxone injection 0.2-0.4 mg (NARCAN)  0.2-0.4 mg Intravenous PRN Itzel Edwards CNS        Or     naloxone injection 0.2-0.4 mg (NARCAN)  0.2-0.4 mg Intramuscular PRN Itzel Edwards, CNS           Physical Exam:  /68 (Patient Site: Right Arm)   Pulse 74   Resp 16   Ht 5' 8.75\" (1.746 m)   Wt (!) 238 lb 11.2 oz (108.3 kg)   SpO2 96%   BMI 35.51 kg/m    Gen: awake, alert, oriented, no distress  HEENT: nasal turbinates are unremarkable, no oropharyngeal lesions, no cervical or supraclavicular lymphadenopathy  CV: RRR, no M/G/R  Resp: CTAB, no focal crackles or wheezes  Abd: soft, nontender, no palpable organomegaly  Skin: no apparent rashes  Ext: no cyanosis, clubbing or edema  Neuro: alert, nonfocal    Labs:  Reviewed  CBC no eos  H/H stable  Chem panel unremarkable  IgG 390  IgA 10  IgE normal  IgM 7    Imaging studies:  HRCT Nov 2019  IMPRESSION:      1.  No findings of a diffuse fibrosing lung disorder, large or small airways abnormality.    PFT's  Dec 2019  FEV1/FVC is 77% and is normal.  FEV1 is 3.17L (91%) predicted and is normal.  FVC is 4.11L (93%) predicted and normal.  There was no improvement in spirometry after a single inhaled dose of bronchodilator.  TLC is 6.57L (92%) " predicted and is normal.  RV is 2.23L (95%) predicted and is normal.  DLCO is 27.26ml/min/hg (96%) predicted and is normal when it is corrected for hemoglobin.  Flow volume loops indicate no abnormalities.     Impression:  Full Pulmonary Function Test is normal.  PFTs are consistent with no obstructive disease.  Spirometry is not consistent with reversibility.  There is no hyperinflation.  There is no air-trapping.  Diffusion capacity when corrected for hemoglobin is  normal.     The ATS criteria for acceptability and reproducibility was met.

## 2021-06-06 NOTE — PROGRESS NOTES
Juvenal arrived A&OX4 ambulatory and stable, confirms he is here for IVIG to treat chronic sinusitis. Seated in chair #6.  He has ongoing sinus congestion, chest tightness and cough that is sometimes productive. He denies fevers. States he has lost a couple of pounds but that his appetite is OK.   POC/medication education reviewed, pt stated understanding and agreed to plan. Port accessed w/ ease, excellent blood return and line easily flushed. VS WNL.  No pre meds ordered  IVIG 40g infused at titrated rate per protocol; Juvenal tolerated infusion w/o sxs adverse reaction.   Line flushed NS and pt monitored 30min post infusion. VSS   port flushed NS20mL, instilled w heparin 6mL 1:100 and gripper needle dc'd, site covered w gauze and papertape.  Dc education/AVS reviewed, pt stated understanding and that his needs were met today.  1255 Juvenal aktz'breann A&Ox4 ambulatory and stable, will schedule future appointments for every 28days per MD orders.

## 2021-06-06 NOTE — PROGRESS NOTES
Received paperwork at pts consult appt with - surgery is not yet scheduled so it will be placed in his folder until we have that date.    It is already signed by Jami.    Fax to # [phone]    Pt would like a call once it is complete and to  a copy.    Reji Yeh ([facility])  [facility]  General and Bariatric Surgery  P: [phone]  F: [phone]

## 2021-06-06 NOTE — PROGRESS NOTES
Pt came into infusion clinic for his IVIG as ordered. Medications explained to pt who verbalized understanding. Port patent throughout infusion. Pt tolerated infusion with no complications. Pt left infusion clinic via ambulatory and will RTC as sched.

## 2021-06-06 NOTE — TELEPHONE ENCOUNTER
"Spoke with patient about rescheduling surgery using the following script:    \"We're taking every precaution to prevent the spread of COVID-19. Our top priority is to protect and care for our patients. After discussing your medical history with your provider, he/she has recommended that we reschedule your upcoming umbilical hernia repair.     We're choosing to do this because patients recovering from surgery are more susceptible to illness than they would be otherwise. Rescheduling your procedure helps us protect you from illness and allows our physicians to care for hospitalized patients.     As we continue to monitor the impact of COVID-19, we will reach out to you in the next couple weeks to reschedule your procedure. If you have questions between now and when we connect with you, please do not hesitate to call us.     Do you have any additional questions?     Please take care of yourself during this time and practice recommended safety measures including social distancing and good hand hygiene. If you develop symptoms, please contact [email or web address] or [phone].\"    Abbey Monson CNP  [phone] pager  [facility] General and Bariatric Surgery        "

## 2021-06-06 NOTE — TELEPHONE ENCOUNTER
Dr. Farrell,    He is wondering about is [facility] paperwork. Please call Juvenal at [phone].

## 2021-06-06 NOTE — PROGRESS NOTES
Completed, signed paperwork faxed to:    [facility]  [phone](P)  [phone](F)    Copy for patient placed at  for  as requested.     [facility]  General Surgery  P: [phone]  F: [phone]

## 2021-06-07 ENCOUNTER — HOSPITAL ENCOUNTER (OUTPATIENT)
Dept: GENERAL RADIOLOGY | Facility: CLINIC | Age: 56
Discharge: HOME | End: 2021-06-07
Payer: COMMERCIAL

## 2021-06-07 ENCOUNTER — HOSPITAL ENCOUNTER (OUTPATIENT)
Dept: CARDIOLOGY | Facility: CLINIC | Age: 56
Discharge: HOME | End: 2021-06-07
Payer: COMMERCIAL

## 2021-06-07 DIAGNOSIS — C82.08 FOLLICULAR LYMPHOMA GRADE I, LYMPH NODES OF MULTIPLE SITES (H): ICD-10-CM

## 2021-06-07 DIAGNOSIS — Z86.2 PERSONAL HISTORY OF DISEASES OF BLOOD AND BLOOD-FORMING ORGANS: ICD-10-CM

## 2021-06-07 LAB
CMV IGG SERPL QL IA: 3.6 AI (ref 0–0.8)
EBV VCA IGG SER QL IA: >8 AI (ref 0–0.8)
HBV DNA SERPL QL NAA+PROBE: NORMAL
HCV RNA SERPL QL NAA+PROBE: NORMAL
HIV1+2 RNA SERPL QL NAA+PROBE: NORMAL
HSV1 IGG SERPL QL IA: 1.3 AI (ref 0–0.8)
HSV2 IGG SERPL QL IA: 1.5 AI (ref 0–0.8)
IGA SERPL-MCNC: 8 MG/DL (ref 84–499)
IGG SERPL-MCNC: 457 MG/DL (ref 610–1616)
IGM SERPL-MCNC: <10 MG/DL (ref 35–242)
TRYPANOSOMA CRUZI: NORMAL
WNV RNA SERPL DONR QL NAA+PROBE: NORMAL

## 2021-06-07 PROCEDURE — 93306 TTE W/DOPPLER COMPLETE: CPT | Mod: 26 | Performed by: INTERNAL MEDICINE

## 2021-06-07 PROCEDURE — 93306 TTE W/DOPPLER COMPLETE: CPT

## 2021-06-07 PROCEDURE — 71046 X-RAY EXAM CHEST 2 VIEWS: CPT | Mod: 26 | Performed by: RADIOLOGY

## 2021-06-07 PROCEDURE — 71046 X-RAY EXAM CHEST 2 VIEWS: CPT

## 2021-06-07 NOTE — PROGRESS NOTES
Juvenal arrived A&OX4 ambulatory and stable, confirms he is here for IVIG to treat chronic sinusitis. Seated in chair #7.  He has ongoing sinus congestion, chest tightness and cough that is sometimes productive but notes some improvement of his symptoms. He denies fevers. POC/medication education reviewed, pt stated understanding and agreed to plan. Juvenal arrived to infusion with his Port accessed from MRI today, line had excellent blood return and line easily flushed. VS WNL.  No pre meds ordered  IVIG 40g infused at titrated rate per protocol; Juvenal tolerated infusion w/o sxs adverse reaction.   Line flushed NS and pt monitored 30min post infusion. VSS   port flushed NS20mL, instilled w heparin 6mL 1:100 and gripper needle dc'd, site covered w gauze and papertape.  Dc education/AVS reviewed, pt stated understanding and that his needs were met today.  1435 Juvenal katz'd A&Ox4 ambulatory and stable, will schedule future appointments for every 28days per MD orders.

## 2021-06-07 NOTE — PROGRESS NOTES
[facility] Hematology and Oncology Progress Note    Patient: Juvenal Blake  MRN: [id number]  Date of Service:  April 7, 2020      Assessment and Plan:    1.  Follicular lymphoma: Reviewed the CT scans.  When compared to January there probably is some progression in some right axillary and subpectoral nodes as well as right inguinal node.  Overall, the nodes remain generally small.  We are going to have him come back in 6 weeks for repeat PET scan.  If they are positive then we will perform a biopsy.  Small chance these could be prostate cancer.  If he turns out to have progressive lymphoma than we will make a plan coordination with the transplant team at the [facility].  He is only 8 months out from his last dose of O-CHOP.     2.  Prostate cancer: We await his PSA from today.  If it is increasing then we will decide if we are going to proceed with watchful waiting versus a ProstaScint scan.  Continue to check PSA every 3 months.    3.  Hematuria: He did have a cystoscopy in the urology clinic in February.  Thought to have radiation cystitis.      4.  Immunodeficiency: He continues on his monthly IVIG dosing.  Has not had any recent sinus infections.  He continues to follow-up with pulmonology.    ECOG Performance   ECOG Performance Status: 0    Distress Assessment  Distress Assessment Score: 2June 28, 2019    Pain  Currently in Pain: No/denies    Diagnosis:    1.  Follicular lymphoma: Diagnosed in April, 2010 . Stage IV diagnosis with bone marrow involvement.  Initial disease involved the cervical, supraclavicular, axillary, mesenteric mass, retroperitoneal nodes, and possibly the manubrium.  Relapse noted on imaging in December 2016.  Second relapse on imaging January 2019.  Repeat biopsy January 7, 2019 shows recurrent low-grade follicular lymphoma.  Grade 2.    2.  Prostate cancer:  Pathologic stage T3b prostate cancer.  Positive margins, multiple, and surgery.  3 lymph nodes were negative.  High risk for  recurrent disease.  He is being followed at [facility].  [place] 4+3 = 7.  Tertiary Maricel pattern 5 and preoperative PSA of 27.     3.  Hypogammaglobulinemia: He has received intermittent doses of IVIG.    Treatment:    Lymphoma:    He had 6 cycles of bendamustine and Rituxan completed in October, 2010.  He had 2 years of maintenance Rituxan therapy finishing in September, 2012.  Second course of bendamustine plus rituximab started February 20, 2017.  Cycle 6 was completed on July 11, 2017.  Then had maintenance rituximab.    O-CHOP started at second relapse.  Cycle 1 given April 23, 2019.  Cycle 6 given August 16, 2019.    Prostate:   Initial radical prostatectomy and bilateral lymph node dissection.  November 15, 2017.  Prostatic adenocarcinoma Maricel 4+3 = 7 with tertiary pattern 5.  Tumor involves 45% of total surface area.  Positive for extensive extraprostatic extension.  Positive for bilateral seminal vesicle invasion.  Multiple margins positive.  Lymphovascular invasion not identified.  Perineural invasion was noted. Lymph nodes negative. 3 were examined (2 right obturator and 1 left obturator).  Completed radiation to the prostate fossa and pelvic lymph nodes at [facility] early May 2018.  He received 4500 cGy in 25 fractions.  He then had 2340 cGy boost in 13 fractions to the prostatic fossa, for a total dose of 6240 cGy in 38 treatment fractions delivered over 54 days.  He did not receive hormonal therapy.    Interim History:    Juvenal returns today for follow-up visit.  He was last seen about 3 months ago.  In the interim he is been doing well.  No fevers, chills, night sweats.  Energy and appetite have been good.    Review of Systems:    Constitutional  Constitutional (WDL): All constitutional elements are within defined limits  Neurosensory  Neurosensory (WDL): All neurosensory elements are within defined limits  Cardiovascular  Cardiovascular (WDL): All cardiovascular elements are  within defined limits  Pulmonary  Respiratory (WDL): Within Defined Limits  Gastrointestinal  Gastrointestinal (WDL): All gastrointestinal elements are within defined limits  Genitourinary  Genitourinary (WDL): All genitourinary elements are within defined limits  Integumentary  Integumentary (WDL): All integumentary elements are within defined limits  Patient Coping  Patient Coping: Accepting  Accompanied by  Accompanied by: Alone    Past History:    Past Medical History:   Diagnosis Date     Arthritis     shoulder per H & P     GERD (gastroesophageal reflux disease)      H/O pyloric stenosis     as an infant per H & P      Inguinal hernia     per H & P      Lazy eye     per H & P      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 Nontoxic and in no distress.   HEENT: Head: atraumatic, normocephalic. Sclerae anicteric.  Chest:  Normal respiratory effort.   Cardiac: No edema.   Abdomen: abdomen is non-distended  Extremities: normal tone and muscle bulk.   Skin: no lesions or rash.   CNS: alert and oriented. Grossly non-focal.   Psychiatric: normal mood and affect.     Lab Results:    Recent Results (from the past 240 hour(s))   Comprehensive Metabolic Panel    Collection Time: 04/07/20  2:49 PM   Result Value Ref Range    Sodium 140 136 - 145 mmol/L    Potassium 4.0 3.5 - 5.0 mmol/L    Chloride 105 98 - 107 mmol/L    CO2 29 22 - 31 mmol/L    Anion Gap, Calculation 6 5 - 18 mmol/L    Glucose 96 70 - 125 mg/dL    BUN 13 8 - 22 mg/dL    Creatinine 1.09 0.70 - 1.30 mg/dL    GFR MDRD Af Amer >60 >60 mL/min/1.73m2    GFR MDRD Non Af Amer >60 >60 mL/min/1.73m2    Bilirubin, Total 0.3 0.0 - 1.0 mg/dL     Calcium 9.2 8.5 - 10.5 mg/dL    Protein, Total 6.9 6.0 - 8.0 g/dL    Albumin 3.9 3.5 - 5.0 g/dL    Alkaline Phosphatase 66 45 - 120 U/L    AST 26 0 - 40 U/L    ALT 39 0 - 45 U/L   PSA, Diagnostic (Prostatic-Specific Antigen)    Collection Time: 04/07/20  2:49 PM   Result Value Ref Range    PSA 0.2 0.0 - 3.5 ng/mL   HM1 (CBC with Diff)    Collection Time: 04/07/20  2:49 PM   Result Value Ref Range    WBC 4.9 4.0 - 11.0 thou/uL    RBC 3.99 (L) 4.40 - 6.20 mill/uL    Hemoglobin 12.9 (L) 14.0 - 18.0 g/dL    Hematocrit 37.9 (L) 40.0 - 54.0 %    MCV 95 80 - 100 fL    MCH 32.3 27.0 - 34.0 pg    MCHC 34.0 32.0 - 36.0 g/dL    RDW 12.7 11.0 - 14.5 %    Platelets 137 (L) 140 - 440 thou/uL    MPV 9.3 8.5 - 12.5 fL    Neutrophils % 81 (H) 50 - 70 %    Lymphocytes % 6 (L) 20 - 40 %    Monocytes % 10 2 - 10 %    Eosinophils % 2 0 - 6 %    Basophils % 0 0 - 2 %    Neutrophils Absolute 4.0 2.0 - 7.7 thou/uL    Lymphocytes Absolute 0.3 (L) 0.8 - 4.4 thou/uL    Monocytes Absolute 0.5 0.0 - 0.9 thou/uL    Eosinophils Absolute 0.1 0.0 - 0.4 thou/uL    Basophils Absolute 0.0 0.0 - 0.2 thou/uL     Imaging:    CT scan images were reviewed.  Shows some increased adenopathy in the right subpectoral area and right inguinal region.    Ct Chest Abdomen Pelvis Without Oral With Iv Contrast    Result Date: 4/6/2020  EXAM: CT CHEST ABDOMEN PELVIS WO ORAL W IV CONTRAST LOCATION: [facility] DATE/TIME: 4/6/2020 10:13 AM INDICATION: Follow-up lymphoma and prostate cancer. COMPARISON: 01/06/2020. TECHNIQUE: CT scan of the chest, abdomen, and pelvis was performed following injection of IV contrast. Multiplanar reformats were obtained. Dose reduction techniques were used. CONTRAST: Iohexol (Omni) 100 mL. FINDINGS: LUNGS AND PLEURA: Stable emphysema and a few small pulmonary nodules. MEDIASTINUM/AXILLAE: Interval enlargement of right axillary and subpectoral oral lymph nodes with example measuring 11 x 16 mm versus 5 x 8 mm (series 4, image 61).  Subpectoral node measures 10 x 13 mm versus 4 x 8 mm (image 36). No other thoracic adenopathy. Portacatheter. HEPATOBILIARY: Hepatic steatosis without focal abnormality. PANCREAS: Normal. SPLEEN: Upper normal splenic size at 12 cm craniocaudad length (previously 11.3 cm). ADRENAL GLANDS: Normal. KIDNEYS/BLADDER: Unremarkable. BOWEL: Unremarkable. LYMPH NODES: Incompletely seen possible enlarged proximal right thigh lymph node measuring 13 x 20 mm versus 7 x 10 mm previously (series 2, image 135). No other significant change to prior. Redemonstrated mesenteric and retroperitoneal hazy soft tissue density and a few small to prominent lymph nodes. Stable central mesenteric 9 x 12 mm node (series 2, image 90). VASCULATURE: Nonaneurysmal aorta. MUSCULOSKELETAL: No focal bony lesion.     1.  Interval mildly enlarged right axillary and subpectoral lymph nodes worrisome for enlarging disease. 2.  An incompletely seen proximal right thigh ovoid density is now present, suggestive of an interval enlarged, though incompletely seen lymph node. Ultrasound in this region could be performed for confirmation if warranted. 3.  No other significant change. 4.  No significant change of mesenteric and retroperitoneal wispy soft tissue density and small to borderline nodes. 5.  Upper normal splenic size. NOTE: ABNORMAL REPORT THE DICTATION ABOVE DESCRIBES AN ABNORMALITY FOR WHICH FOLLOW-UP IS NEEDED.       Signed by: Rob Crooks MD

## 2021-06-08 ENCOUNTER — ALLIED HEALTH/NURSE VISIT (OUTPATIENT)
Dept: TRANSPLANT | Facility: CLINIC | Age: 56
End: 2021-06-08
Payer: COMMERCIAL

## 2021-06-08 ENCOUNTER — OFFICE VISIT (OUTPATIENT)
Dept: TRANSPLANT | Facility: CLINIC | Age: 56
End: 2021-06-08
Payer: COMMERCIAL

## 2021-06-08 VITALS
HEART RATE: 80 BPM | OXYGEN SATURATION: 98 % | SYSTOLIC BLOOD PRESSURE: 106 MMHG | DIASTOLIC BLOOD PRESSURE: 64 MMHG | RESPIRATION RATE: 16 BRPM

## 2021-06-08 VITALS
OXYGEN SATURATION: 98 % | DIASTOLIC BLOOD PRESSURE: 64 MMHG | HEART RATE: 80 BPM | RESPIRATION RATE: 16 BRPM | SYSTOLIC BLOOD PRESSURE: 106 MMHG

## 2021-06-08 DIAGNOSIS — C82.08 FOLLICULAR LYMPHOMA GRADE I, LYMPH NODES OF MULTIPLE SITES (H): ICD-10-CM

## 2021-06-08 DIAGNOSIS — C82.08 FOLLICULAR LYMPHOMA GRADE I, LYMPH NODES OF MULTIPLE SITES (H): Primary | ICD-10-CM

## 2021-06-08 DIAGNOSIS — Z86.2 PERSONAL HISTORY OF DISEASES OF BLOOD AND BLOOD-FORMING ORGANS: ICD-10-CM

## 2021-06-08 LAB
BASOPHILS # BLD AUTO: 0.4 10E9/L (ref 0–0.2)
BASOPHILS NFR BLD AUTO: 3.5 %
DIFFERENTIAL METHOD BLD: ABNORMAL
DLCOCOR-%PRED-PRE: 88 %
DLCOCOR-PRE: 24.8 ML/MIN/MMHG
DLCOUNC-%PRED-PRE: 76 %
DLCOUNC-PRE: 21.66 ML/MIN/MMHG
DLCOUNC-PRED: 28.16 ML/MIN/MMHG
EOSINOPHIL # BLD AUTO: 1 10E9/L (ref 0–0.7)
EOSINOPHIL NFR BLD AUTO: 8.8 %
ERV-%PRED-PRE: 78 %
ERV-PRE: 0.66 L
ERV-PRED: 0.83 L
ERYTHROCYTE [DISTWIDTH] IN BLOOD BY AUTOMATED COUNT: 15.8 % (ref 10–15)
EXPTIME-PRE: 6.62 SEC
FEF2575-%PRED-POST: 111 %
FEF2575-%PRED-PRE: 90 %
FEF2575-POST: 3.53 L/SEC
FEF2575-PRE: 2.87 L/SEC
FEF2575-PRED: 3.17 L/SEC
FEFMAX-%PRED-PRE: 74 %
FEFMAX-PRE: 7 L/SEC
FEFMAX-PRED: 9.43 L/SEC
FEV1-%PRED-PRE: 75 %
FEV1-PRE: 2.81 L
FEV1FEV6-PRE: 82 %
FEV1FEV6-PRED: 79 %
FEV1FVC-PRE: 82 %
FEV1FVC-PRED: 78 %
FEV1SVC-PRE: 73 %
FEV1SVC-PRED: 73 %
FIFMAX-PRE: 6.66 L/SEC
FRCPLETH-%PRED-PRE: 82 %
FRCPLETH-PRE: 2.96 L
FRCPLETH-PRED: 3.58 L
FVC-%PRED-PRE: 72 %
FVC-PRE: 3.44 L
FVC-PRED: 4.76 L
HCT VFR BLD AUTO: 33.4 % (ref 40–53)
HGB BLD-MCNC: 10.4 G/DL (ref 13.3–17.7)
IC-%PRED-PRE: 76 %
IC-PRE: 3.21 L
IC-PRED: 4.23 L
LYMPHOCYTES # BLD AUTO: 5.6 10E9/L (ref 0.8–5.3)
LYMPHOCYTES NFR BLD AUTO: 48.2 %
MCH RBC QN AUTO: 28.7 PG (ref 26.5–33)
MCHC RBC AUTO-ENTMCNC: 31.1 G/DL (ref 31.5–36.5)
MCV RBC AUTO: 92 FL (ref 78–100)
METAMYELOCYTES # BLD: 0.1 10E9/L
METAMYELOCYTES NFR BLD MANUAL: 0.9 %
MONOCYTES # BLD AUTO: 0.2 10E9/L (ref 0–1.3)
MONOCYTES NFR BLD AUTO: 1.7 %
NEUTROPHILS # BLD AUTO: 4.2 10E9/L (ref 1.6–8.3)
NEUTROPHILS NFR BLD AUTO: 36 %
NRBC # BLD AUTO: 0.1 10*3/UL
NRBC BLD AUTO-RTO: 1 /100
PLATELET # BLD AUTO: 87 10E9/L (ref 150–450)
PLATELET # BLD EST: ABNORMAL 10*3/UL
PROMYELOCYTES # BLD MANUAL: 0.1 10E9/L
PROMYELOCYTES NFR BLD MANUAL: 0.9 %
RBC # BLD AUTO: 3.62 10E12/L (ref 4.4–5.9)
RBC MORPH BLD: NORMAL
RVPLETH-%PRED-PRE: 97 %
RVPLETH-PRE: 2.3 L
RVPLETH-PRED: 2.35 L
TLCPLETH-%PRED-PRE: 86 %
TLCPLETH-PRE: 6.17 L
TLCPLETH-PRED: 7.13 L
VA-%PRED-PRE: 77 %
VA-PRE: 5.06 L
VC-%PRED-PRE: 76 %
VC-PRE: 3.87 L
VC-PRED: 5.06 L
WBC # BLD AUTO: 11.7 10E9/L (ref 4–11)

## 2021-06-08 PROCEDURE — 85097 BONE MARROW INTERPRETATION: CPT | Mod: 26 | Performed by: PATHOLOGY

## 2021-06-08 PROCEDURE — 88185 FLOWCYTOMETRY/TC ADD-ON: CPT

## 2021-06-08 PROCEDURE — 85025 COMPLETE CBC W/AUTO DIFF WBC: CPT | Performed by: PHYSICIAN ASSISTANT

## 2021-06-08 PROCEDURE — 88342 IMHCHEM/IMCYTCHM 1ST ANTB: CPT | Mod: 26 | Performed by: PATHOLOGY

## 2021-06-08 PROCEDURE — 999N001022 HC STATISTIC H-SPHEME PROCESS B/S

## 2021-06-08 PROCEDURE — 88342 IMHCHEM/IMCYTCHM 1ST ANTB: CPT | Mod: TC

## 2021-06-08 PROCEDURE — 88280 CHROMOSOME KARYOTYPE STUDY: CPT

## 2021-06-08 PROCEDURE — 38222 DX BONE MARROW BX & ASPIR: CPT

## 2021-06-08 PROCEDURE — 88311 DECALCIFY TISSUE: CPT | Mod: TC

## 2021-06-08 PROCEDURE — 88341 IMHCHEM/IMCYTCHM EA ADD ANTB: CPT | Mod: TC

## 2021-06-08 PROCEDURE — 85060 BLOOD SMEAR INTERPRETATION: CPT | Mod: 26 | Performed by: PATHOLOGY

## 2021-06-08 PROCEDURE — 88305 TISSUE EXAM BY PATHOLOGIST: CPT | Mod: TC

## 2021-06-08 PROCEDURE — 999N001086 HC STATISTIC MORPHOLOGY W/INTERP HEMEPATH TC 85060

## 2021-06-08 PROCEDURE — 88305 TISSUE EXAM BY PATHOLOGIST: CPT | Mod: 26 | Performed by: PATHOLOGY

## 2021-06-08 PROCEDURE — 999N001068 HC STATISTIC BONE MARROW CORE PERF TC 38221

## 2021-06-08 PROCEDURE — 88341 IMHCHEM/IMCYTCHM EA ADD ANTB: CPT | Mod: 26 | Performed by: PATHOLOGY

## 2021-06-08 PROCEDURE — 93005 ELECTROCARDIOGRAM TRACING: CPT

## 2021-06-08 PROCEDURE — 88237 TISSUE CULTURE BONE MARROW: CPT

## 2021-06-08 PROCEDURE — 93010 ELECTROCARDIOGRAM REPORT: CPT | Performed by: INTERNAL MEDICINE

## 2021-06-08 PROCEDURE — 88264 CHROMOSOME ANALYSIS 20-25: CPT

## 2021-06-08 PROCEDURE — 88184 FLOWCYTOMETRY/ TC 1 MARKER: CPT

## 2021-06-08 PROCEDURE — 88161 CYTOPATH SMEAR OTHER SOURCE: CPT | Mod: TC

## 2021-06-08 PROCEDURE — 88311 DECALCIFY TISSUE: CPT | Mod: 26 | Performed by: PATHOLOGY

## 2021-06-08 PROCEDURE — 999N001102 HC STATISTIC DNA PROCESS AND HOLD

## 2021-06-08 PROCEDURE — 88291 CYTO/MOLECULAR REPORT: CPT | Performed by: MEDICAL GENETICS

## 2021-06-08 PROCEDURE — 88275 CYTOGENETICS 100-300: CPT

## 2021-06-08 PROCEDURE — 88188 FLOWCYTOMETRY/READ 9-15: CPT | Mod: 26 | Performed by: PATHOLOGY

## 2021-06-08 PROCEDURE — 999N001126 HC STATISTIC BONE MARROW INTERP TC 85097

## 2021-06-08 PROCEDURE — 88271 CYTOGENETICS DNA PROBE: CPT

## 2021-06-08 PROCEDURE — 250N000011 HC RX IP 250 OP 636: Performed by: PHYSICIAN ASSISTANT

## 2021-06-08 PROCEDURE — 999N001136 HC STATISTIC FLOW INT 9-15 ABY TC 88188

## 2021-06-08 RX ORDER — HEPARIN SODIUM (PORCINE) LOCK FLUSH IV SOLN 100 UNIT/ML 100 UNIT/ML
5 SOLUTION INTRAVENOUS ONCE
Status: COMPLETED | OUTPATIENT
Start: 2021-06-08 | End: 2021-06-08

## 2021-06-08 RX ADMIN — SODIUM CHLORIDE, PRESERVATIVE FREE 5 ML: 5 INJECTION INTRAVENOUS at 09:40

## 2021-06-08 ASSESSMENT — PAIN SCALES - GENERAL
PAINLEVEL: MILD PAIN (2)
PAINLEVEL: MILD PAIN (2)

## 2021-06-08 NOTE — INTERVAL H&P NOTE
I have performed an assessment and examined the patient, as necessary, to update the patient's current status that may have changed since the prior History and Physical.  The History & Physical has been reviewed and no updates are needed.      For this we will biopsy a R axillary lymph node    For the Umb Hernia    HPI:  This is a 54 y.o. male here today with swelling at the Umbilicus over the last 3 years. It is painful when he leans against the swelling.  He has had episodes when the tissue does not want to go back in.  He has not had any GI symptoms with this yet except the occasional nausea when the hernia protrudes out.     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)       Low serum IgG for age       Non Hodgkin's lymphoma (H)       Prostate CA (H)       Sleep apnea       CPAP              Past Surgical History:   Procedure Laterality Date     ABDOMINAL SURGERY         for pyloric stenosis as an infant     COLONOSCOPY         DISTAL CLAVICLE EXCISION         per H & P      HERNIA REPAIR         inguinal     port a cath placement   01/2017     VA LAP,PROSTATECTOMY,RADICAL,W/NERVE SPARE,INCL ROBOTIC N/A 11/15/2017     Procedure: ROBOTIC ASSISTED RADICAL RETROPUBIC PROSTATECTOMY BILATERAL PELVIC LYMPH NODE DISSECTION ;  Surgeon: Ezio Steele MD;  Location: [facility];  Service: Urology     shoulder arthroscopy Left       TONSILLECTOMY         US LYMPH NODE BIOPSY   1/7/2019        CURRENT MEDS:  Current Medications          Current Outpatient Medications   Medication Sig Dispense Refill     albuterol (PROAIR HFA;PROVENTIL HFA;VENTOLIN HFA) 90 mcg/actuation inhaler Inhale 2 puffs every 6 (six) hours  as needed for wheezing.         azelastine (ASTELIN) 137 mcg (0.1 %) nasal spray 1 spray into each nostril 2 (two) times a day.   11     beclomethasone (QVAR) 80 mcg/actuation inhaler Inhale 1 puff 2 (two) times a day as needed.                   fluticasone propionate (FLONASE) 50 mcg/actuation nasal spray           lidocaine-prilocaine (EMLA) cream Apply to port 1/2 to 1 hour prior to accessing. 30 g 2     methylPREDNISolone (MEDROL DOSEPACK) 4 mg tablet           multivitamin (DAILY-ROSARIO) per tablet 1 tab(s) orally once a day         naproxen (NAPROSYN) 500 MG tablet Take 500 mg by mouth as needed.   2     omeprazole (PRILOSEC) 20 MG capsule Take 2 capsules (40 mg total) by mouth 2 (two) times a day before meals. 60 capsule 2     psyllium (METAMUCIL) 3.4 gram packet Take 1 packet by mouth daily.         tolterodine (DETROL LA) 4 MG ER capsule Take 4 mg by mouth daily.   3      No current facility-administered medications for this visit.                 Facility-Administered Medications Ordered in Other Visits   Medication Dose Route Frequency Provider Last Rate Last Dose     HYDROcodone-acetaminophen  mg per tablet 1-2 tablet (NORCO )  1-2 tablet Oral Q6H PRN Itzel Edwards, CNS         naloxone injection 0.2-0.4 mg (NARCAN)  0.2-0.4 mg Intravenous PRN Itzel Edwards, CNS         Or     naloxone injection 0.2-0.4 mg (NARCAN)  0.2-0.4 mg Intramuscular PRN Itzel Edwards, CNS                     Family History   Problem Relation Age of Onset     Colon cancer Mother           diagnosed in her late 40s     Lymphoma Father           non-Hodgkins lymphoma, diagnosed 70s     No Medical Problems Sister       No Medical Problems Brother       No Medical Problems Son       No Medical Problems Sister       No Medical Problems Son       Lung cancer Maternal Grandmother       Lung cancer Maternal Uncle       Family history is not pertinent to this patients Chief Complaint.      reports that he quit smoking  about 24 years ago. He has a 20.00 pack-year smoking history. He has never used smokeless tobacco. He reports current alcohol use of about 10.0 standard drinks of alcohol per week. He reports that he does not use drugs.     Review of Systems -   10 point Review of systems is negative except for; as mentioned above in HPI and PMHx     /72 (Patient Site: Right Arm, Patient Position: Sitting, Cuff Size: Adult Regular)   Pulse 62   Wt (!) 236 lb (107 kg)   SpO2 97%   BMI 35.11 kg/m    Body mass index is 35.11 kg/m .     EXAM:  GENERAL: Well developed male, overweight  HEENT: EOMI, Anicteric Sclera, Moist Mucous Membranes,  In Mouth the pt does not have redness or bleeding gums  CARDIOVASCULAR: RRR w/out murmur   CHEST/LUNG: Clear to Auscultation  ABDOMEN:  Non tender to palpation, +BS, 2 cm Umb Hernia defect  MUSCULOSKELETAL:  No deformities with good range of motion in all extremities  NEURO: He is ambulatory with good strength in both legs.  HEME/LYMPH: No Cervical Adenopathy or tenderness.      IMAGES:  none     Assessment/Plan:  Umbilical hernia.  This could be treated with an open or laparoscopic hernia repair.     Laparoscopic UHR        Rob Farrell MD  [facility]  [phone]

## 2021-06-08 NOTE — OP NOTE
Operative Note    Name:  Juvenal Blake  Location: [facility] OR  Procedure Date:  5/28/2020  PCP:  Cole Sandoval MD      HERNIORRHAPHY, UMBILICAL, LAPAROSCOPIC; AXILLARY LYMPH NODE BIOPSY (Right)    Pre-Procedure Diagnosis:  Umbilical hernia without obstruction and without gangrene [K42.9]     Post-Procedure Diagnosis:    Umbilical hernia, lymphadenopathy in a patient with a history of lymphoma      Surgeon(s):  Rob Farrell MD    Anesthesia Type:  [unfilled]    Past Medical History:   Diagnosis Date     Arthritis     shoulder per H & P     GERD (gastroesophageal reflux disease)      H/O pyloric stenosis     as an infant per H & P      Inguinal hernia     per H & P      Lazy eye     per H & P      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)      Low serum IgG for age      Non Hodgkin's lymphoma (H)      Prostate CA (H)      Sleep apnea     CPAP       Patient Active Problem List    Diagnosis Date Noted     History of lymphoma      Umbilical hernia without obstruction and without gangrene 03/10/2020     Hypogammaglobulinemia (H)      IgA deficiency (H)      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    A curvilinear incision was made near the umbilicus subcutaneous tissues were dissected with electrocautery the hernia sac was isolated and divided.  Content within the hernia was reduced back into the intra-abdominal cavity.  The hernia sac was removed a large  ventral X mesh was advanced into the intra-abdominal cavity and the abdominal cavity was closed with interrupted 0 PDS sutures bringing the fascia back together and securing the mesh to the anterior abdominal wall.  I then placed two 5 mm trochars in the patient's right side under direct visualization of 0 degree laparoscope and used the secure strap fascial tacking device to secure the perimeter of the mesh up to the anterior abdominal wall.  The wounds were all closed with 4-0 subcuticular Monocryl sutures.  The wounds are dressed with Telfa and Tegaderms.    Procedure #2 was lymph node dissection of the right axilla.  A 2 inch incision was made at the base of the axilla subcutaneous tissues were dissected with electrocautery.  The pectoral axillary fascia was divided and the underlying lymphatic tissue was encountered.  A region of lymphatic tissue was dissected around with a clamp and tie technique and a segment of the mid axillary region was excised while tying off the surrounding tissues with 2-0 Vicryl ties.  The operative field was irrigated the subcutaneous tissues were drawn together with 3-0 Vicryls the skin was closed with a running 4-0 subcuticular Monocryl suture.  The wound was dressed with Telfa and Tegaderm.  The patient was extubated and taken to recovery is no complications with either these 2 procedures.    Estimated Blood Loss: 10 mL from 5/28/2020 10:11 AM to 5/28/2020 11:51 AM      Specimens:    ID Type Source Tests Collected by Time   A :  Tissue Lymph Node(s), Axillary, Right SURGICAL PATHOLOGY EXAM Rob Farrell MD 5/28/2020 1051          Drains:        Implants:  [unfilled]    Complications:    None    Rob Farrell     Date: 5/28/2020  Time: 2:12 PM

## 2021-06-08 NOTE — PROGRESS NOTES
[facility] Radiology Pre-Procedure Note  Date/Time: 2/17/2017/10:33 AM    Requested Procedure:  Port Placement  Requested Provider:  Dr. Crooks    HPI: Juvenal Blake is a 51 y.o. male with Follicular lymphoma and plans for chemotherapy to start on Monday. Patient is here today to have a port inserted.       NPO status:  midnight  Anticoagulation/Antiplatelets/Bleeding tendencies:  none  Antibiotics:  Ancef IV for today's procedure    PAST MEDICAL HISTORY:      Past Medical History:   Diagnosis Date     Cancer      Non Hodgkin's lymphoma      Patient Active Problem List   Diagnosis     Follicular lymphoma grade i, lymph nodes of multiple sites         PAST SURGICAL HISTORY:  Past Surgical History:   Procedure Laterality Date     non hodgkins lymphoma       shoulder arthroscopy Left        ALLERGIES:  Review of patient's allergies indicates no known allergies.    MEDICATIONS:  Current Outpatient Prescriptions   Medication Sig Dispense Refill     allopurinol (ZYLOPRIM) 300 MG tablet Take 1 tablet (300 mg total) by mouth daily. 30 tablet 2     hydrOXYzine (VISTARIL) 25 MG capsule Take 25 mg by mouth 3 (three) times a day as needed for itching.       naproxen (NAPROSYN) 500 MG tablet Take 1 tablet by mouth as needed.   2     omeprazole (PRILOSEC) 20 MG capsule Take 20 mg by mouth daily.       oxyCODONE (ROXICODONE) 5 MG immediate release tablet Take 5 mg by mouth every 4 (four) hours as needed.        ondansetron (ZOFRAN, AS HYDROCHLORIDE,) 4 MG tablet Take 1-2 tablets (4-8 mg total) by mouth every 4 (four) hours as needed for nausea. 30 tablet 1     Current Facility-Administered Medications   Medication Dose Route Frequency Provider Last Rate Last Dose     ceFAZolin 2 g in 100 mL in D5W (ANCEF)  2 g Intravenous Once Reji Morales CNP         lidocaine 10 mg/mL (1 %) injection 0.1-0.3 mL  0.1-0.3 mL Subcutaneous PRN Reji Morales CNP         sodium chloride 0.9 % flush 3 mL (NS)  3 mL Intravenous Line Care  "Reji Morales CNP           LABS:  INR, hgb, plt PENDING  No results found for: INR, PROTIME    Lab Results   Component Value Date    WBC 5.5 02/15/2017    HGB 11.9 (L) 02/15/2017    HCT 36.6 (L) 02/15/2017    MCV 87 02/15/2017     02/15/2017     Lab Results   Component Value Date    CREATININE 1.12 02/15/2017       EXAM:  Visit Vitals     /79     Pulse 67     Temp 98  F (36.7  C) (Oral)     Resp 16     Ht 5' 8\" (1.727 m)     Wt (!) 245 lb (111.1 kg)     SpO2 99%     BMI 37.25 kg/m2     General:  Stable.  In no acute distress.  Neuro:  A&O x 3.  Moves all extremities equally.  Resp:  Lungs clear to auscultation bilaterally.  Cardio:  S1S2 and reg, without murmur, clicks or rubs.  Skin:  No excoriations, ecchymosis, erythema, lesions, or open sores noted on upper chest/neck.     Pre-Sedation Assessment:  Mallampati Airway Classification: Class 2: upper half of tonsil fossa visible  Previous reaction to anesthesia/sedation: no  Sedation plan based on assessment: Moderate  Sleep Apnea: yes; wears CPAP  Dentures: no  COPD: no  ASA Classification: ASA 3 - Patient with moderate systemic disease with functional limitations  Comments: history of tobacco abuse. No asthma.     ASSESSMENT:  51 y.o male with Follicular lymphoma with plans for chemotherapy; here for port insertion.      PLAN:  Port placement with sedation.     The procedure, risks and moderate sedation were discussed with patient, all questions answered and patient agrees to proceed with the procedure.   Written consent obtained.    Kacey Esquivel CNP  Interventional Radiology        "

## 2021-06-08 NOTE — H&P (VIEW-ONLY)
[facility] Hematology and Oncology Progress Note    Patient: Juvenal Blake  MRN: [id number]  Date of Service:  May 19 , 2020         The patient has chosen to have the visit conducted as a telephone visit, to reduce risk of exposure given the current status of Coronavirus in our community. This telephone visit is being conducted via a call between the patient and physician/provider. Health care needs are being provided without a physical exam.     The patient has been notified of following:      We have found that certain health care needs can be provided without the need for a physical exam.  This service lets us provide the care you need with a short phone conversation.  If a prescription is necessary we can send it directly to your pharmacy.  If lab work is needed we can place an order for that and you can then stop by our lab to have the test done at a later time.  If during the course of the call the physician/provider feels a telephone visit is not appropriate, you will not be charged for this service    The patient consents to a telephone visit.     Assessment and Plan:    1.  Follicular lymphoma: PET scan images reviewed.  Show progressive disease.  Images reviewed with Juvenal and his wife. I would like to get a biopsy to evaluate for transformation. I am also going to refer to the [facility] for a transplant evaluation. He is now about 9 months out from completion of his most recent treatment.    2.  Prostate cancer:  PSA is stable at low level. Checking every 3 months.     3.  Immunodeficiency: He continues on his monthly IVIG dosing.  Has not had any recent sinus infections.  He continues to follow-up with pulmonology.    Total time on the phone was 18 minutes,     ECOG Performance   ECOG Performance Status: 0    Distress Assessment  Distress Assessment Score: No distressJune 28, 2019    Pain  Currently in Pain: No/denies    Diagnosis:    1.  Follicular lymphoma: Diagnosed in April, 2010 . Stage IV diagnosis with  bone marrow involvement.  Initial disease involved the cervical, supraclavicular, axillary, mesenteric mass, retroperitoneal nodes, and possibly the manubrium.  Relapse noted on imaging in December 2016.  Second relapse on imaging January 2019.  Repeat biopsy January 7, 2019 shows recurrent low-grade follicular lymphoma.  Grade 2.    2.  Prostate cancer:  Pathologic stage T3b prostate cancer.  Positive margins, multiple, and surgery.  3 lymph nodes were negative.  High risk for recurrent disease.  He is being followed at [facility].  Dalton 4+3 = 7.  Tertiary Maricel pattern 5 and preoperative PSA of 27.     3.  Hypogammaglobulinemia: He has received intermittent doses of IVIG.    Treatment:    Lymphoma:    He had 6 cycles of bendamustine and Rituxan completed in October, 2010.  He had 2 years of maintenance Rituxan therapy finishing in September, 2012.  Second course of bendamustine plus rituximab started February 20, 2017.  Cycle 6 was completed on July 11, 2017.  Then had maintenance rituximab.    O-CHOP started at second relapse.  Cycle 1 given April 23, 2019.  Cycle 6 given August 16, 2019.    Prostate:   Initial radical prostatectomy and bilateral lymph node dissection.  November 15, 2017.  Prostatic adenocarcinoma Maricel 4+3 = 7 with tertiary pattern 5.  Tumor involves 45% of total surface area.  Positive for extensive extraprostatic extension.  Positive for bilateral seminal vesicle invasion.  Multiple margins positive.  Lymphovascular invasion not identified.  Perineural invasion was noted. Lymph nodes negative. 3 were examined (2 right obturator and 1 left obturator).  Completed radiation to the prostate fossa and pelvic lymph nodes at [facility] early May 2018.  He received 4500 cGy in 25 fractions.  He then had 2340 cGy boost in 13 fractions to the prostatic fossa, for a total dose of 6240 cGy in 38 treatment fractions delivered over 54 days.  He did not receive hormonal  "therapy.    Interim History:    Juvenal returns today for follow-up visit.  This was a telephone visit. He is here to review PET results.  He is feeling ok. No acute complaints.    Review of Systems:    Constitutional  Constitutional (WDL): All constitutional elements are within defined limits  Neurosensory  Neurosensory (WDL): All neurosensory elements are within defined limits  Cardiovascular  Cardiovascular (WDL): Exceptions to WDL  Edema: Yes  Pulmonary  Respiratory (WDL): Within Defined Limits  Gastrointestinal  Gastrointestinal (WDL): All gastrointestinal elements are within defined limits  Genitourinary  Genitourinary (WDL): All genitourinary elements are within defined limits  Integumentary  Integumentary (WDL): All integumentary elements are within defined limits  Patient Coping  Patient Coping: Accepting  Accompanied by  Accompanied by: Family Member(wife Nancy)    Past History:    Past Medical History:   Diagnosis Date     Arthritis     shoulder per H & P     GERD (gastroesophageal reflux disease)      H/O pyloric stenosis     as an infant per H & P      Inguinal hernia     per H & P      Lazy eye     per H & P      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 Shows recurrent disease.     Nm Pet Ct Skull To Mid Thigh    Result Date: 5/18/2020  EXAM: NM PET CT SKULL TO MID THIGH LOCATION: [facility] DATE/TIME: 5/18/2020 12:34 PM INDICATION: Subsequent treatment planning and restaging for follicular " lymphoma grade 1, lymph nodes of multiple sites. Status post chemotherapy. Monitor treatment response COMPARISON: CT the chest abdomen pelvis dated 04/06/2020 and FDG PET/CT dated 09/11/2019 TECHNIQUE: Serum glucose level 83 mg/dL. One hour post intravenous administration of 9 point mCi F-18 FDG, PET imaging was performed from the skull base to the mid thighs utilizing attenuation correction with concurrent axial CT and PET/CT image fusion. Dose reduction techniques were used. FINDINGS: FDG avid bilateral supraclavicular (max SUV 13.9), right axillary (max SUV 13.5), right lower paratracheal station 4R (max SUV 5.8), bilateral retroperitoneal (max SUV 9.4), right common iliac (max SUV 8.7), right external iliac (max SUV 4.7), and right inguinal (max SUV 14.3) lymph nodes suspicious for lymphomatous involvement. Mild senescent intracranial changes. Mild paranasal sinus because of thickening. Right chest port with tip terminating near the superior cavoatrial junction. Mild coronary artery calcium. Right renal cysts. Mild stranding within the central mesentery. Prostatectomy. Pelvic phleboliths. Remote left posterior sixth rib fracture. Multilevel degenerative changes of the spine.     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      Signed by: Rob Crooks MD

## 2021-06-08 NOTE — ANESTHESIA PREPROCEDURE EVALUATION
Anesthesia Evaluation      Patient summary reviewed     Airway   Mallampati: II  Neck ROM: full   Pulmonary - normal exam   (+) sleep apnea,                          Cardiovascular - negative ROS and normal exam   Neuro/Psych - negative ROS     Endo/Other    (+) obesity,      GI/Hepatic/Renal    (+) GERD,             Dental - normal exam                        Anesthesia Plan  Planned anesthetic: general endotracheal    ASA 3   Induction: intravenous   Anesthetic plan and risks discussed with: patient    Post-op plan: routine recovery

## 2021-06-08 NOTE — PROGRESS NOTES
"Juvenal Blake is a 54 y.o. male who is being evaluated via a billable telephone visit.      The patient has been notified of following:     \"This telephone visit will be conducted via a call between you and your physician/provider. We have found that certain health care needs can be provided without the need for a physical exam.  This service lets us provide the care you need with a short phone conversation.  If a prescription is necessary we can send it directly to your pharmacy.  If lab work is needed we can place an order for that and you can then stop by our lab to have the test done at a later time.    Telephone visits are billed at different rates depending on your insurance coverage. During this emergency period, for some insurers they may be billed the same as an in-person visit.  Please reach out to your insurance provider with any questions.    If during the course of the call the physician/provider feels a telephone visit is not appropriate, you will not be charged for this service.\"    Patient has given verbal consent to a Telephone visit? Yes      Patient would like to receive their AVS by AVS Preference: [employer].    HPI: Pt is  s/p lap UHR and right axilla lymph node biopsy with Dr. Farrell on 5/28/20.   he is doing well.  Pain is well controlled:  Yes, mostly sore in right arm, but getting better. No difficulties with the surgical wound/wounds, no reports of erythema or drainage, ecchymosis sounds like it is resolving.  he is eating well and denies fever and chills.  No numbness or tingling in right arm. Having normal bowel movements.         Assessment/Plan: Doing well after surgery and should follow up as needed.    Adi Yao PA-C  [phone]  General Surgery       Phone call duration: 6 minutes    Adi Yao PA-C    "

## 2021-06-08 NOTE — ANESTHESIA POSTPROCEDURE EVALUATION
Patient: Juvenal Blake  Procedure(s):  HERNIORRHAPHY, UMBILICAL, LAPAROSCOPIC; AXILLARY LYMPH NODE BIOPSY (Right)  Anesthesia type: MAC    Patient location: PACU  Last vitals:   Vitals Value Taken Time   /82 5/28/2020  1:15 PM   Temp 36.6  C (97.8  F) 5/28/2020 12:50 PM   Pulse 67 5/28/2020  1:23 PM   Resp 16 5/28/2020  1:00 PM   SpO2 95 % 5/28/2020  1:23 PM   Vitals shown include unvalidated device data.  Post vital signs: stable  Level of consciousness: awake and responds to simple questions  Post-anesthesia pain: pain controlled  Post-anesthesia nausea and vomiting: no  Pulmonary: unassisted, return to baseline  Cardiovascular: stable and blood pressure at baseline  Hydration: adequate  Anesthetic events: no    QCDR Measures:  ASA# 11 - [place]-op Cardiac Arrest: ASA11B - Patient did NOT experience unanticipated cardiac arrest  ASA# 12 - [place]-op Mortality Rate: ASA12B - Patient did NOT die  ASA# 13 - PACU Re-Intubation Rate: ASA13B - Patient did NOT require a new airway mgmt  ASA# 10 - Composite Anes Safety: ASA10A - No serious adverse event    Additional Notes:

## 2021-06-08 NOTE — PROGRESS NOTES
[facility] Hematology and Oncology Progress Note    Patient: Juvenal Blake  MRN: [id number]  Date of Service:  February 15, 2017      Assessment and Plan:    1.  Follicular lymphoma: Patient comes in today after being seen 2 months ago.  He called and said he is having some more lower extremity edema.  He recently had a PET scan done.  He is here to review the results.  There is a fair amount of lymphoma in the abdomen in the form of lymphadenopathy.  He also appears to have some bony involvement in the pelvis.  He is having some leg edema but no obvious compressive symptoms from the lymphoma.  Given the amount of involvement of the abdominal nodes that think he should start treatment.  Since it's been just over 6 years since his last dose of bendamustine I would like to retreat him with BR.  The rationale for this was explained to the patient and his wife.  I would expect this to be effective for him given the long treatment free interval.  Reviewed some of the more common side effects of the treatment.  He was given some information on the drugs to take home and review.  A consent form was signed.  Overall he tolerated his first course okay.  He will be given prescriptions for Zofran and allopurinol.  This will be given for two weeks for tumor lysis syndrome prophylaxis.  Her most going to get a biopsy of the right axillary node since it's been almost 7 years since his initial tissue diagnosis.  It's noted that his LDH is normal.    2.  Palpitations: He feels like he occasionally has a skipped beat.  He is going to get an echocardiogram in the next few days.    3.  Elevated TSH: We'll follow for now.  He does not have a history of hypothyroidism.    ECOG Performance   ECOG Performance Status: 2    Distress Assessment  Distress Assessment Score: No distress    Pain  Currently in Pain: Yes  Pain Score (Initial OR Reassessment): 7  Location: Both shoulder    Diagnosis:    1.  Follicular lymphoma: Diagnosed in April,  2010 . Stage IV diagnosis with bone marrow involvement.  Initial disease involved the cervical, supraclavicular, axillary, mesenteric mass, retroperitoneal nodes, and possibly the manubrium.    Treatment:    1.  He had 6 cycles of bendamustine and Rituxan completed in October, 2010.  He had 2 years of maintenance Rituxan therapy finishing in September, 2012.    Interim History:    Juvenal returns today for follow-up visit.  He was in 2 months ago.  In the interim things have been generally stable.  He notes that he occasionally has a skipped heartbeat and some dyspnea on exertion.  Denies lightheadedness or dizziness.  Has chronic bilateral shoulder pain but no chest pain.  He has bilateral lower extremity edema without any calf tenderness.  Overall this is slightly progressive over the past few months.  No fevers, chills, or night sweats.    Review of Systems:    Constitutional  Constitutional (WDL): Exceptions to WDL  Fatigue: Fatigue relieved by rest  Weight Gain: 5 - <10% from baseline (up 19lbs since 12/14)  Neurosensory  Neurosensory (WDL): Exceptions to WDL  Peripheral Motor Neuropathy: Asymptomatic, clinical or diagnostic observations only, intervention not indicated (recent shoulder surgery)  Ataxia: Asymptomatic, clinical or diagnostic observations only, intervention not indicated (from swelling in feet)  Peripheral Sensory Neuropathy: Asymptomatic, loss of deep tendon reflexes or paresthesia (N/T in feet, occ in hands)  Cardiovascular  Cardiovascular (WDL): Exceptions to WDL  Palpitations: Definition: A disorder characterized by inflammation of the muscle tissue of the heart. (occ skips a beat)  Edema: Yes  Edema Limbs: 5 - 10% inter-limb discrepancy in volume or circumference at point of greatest visible difference, swelling or obscuration of anatomic architecture on close inspection (Legs, improved)  Pulmonary  Respiratory (WDL): Exceptions to WDL  Dyspnea: Shortness of breath with moderate  exertion  Gastrointestinal  Gastrointestinal (WDL): Exceptions to WDL  Constipation: Occasional or intermittent symptoms, occasional use of stool softeners, laxatives, dietary modification, or enema  Genitourinary  Genitourinary (WDL): All genitourinary elements are within defined limits  Integumentary  Integumentary (WDL): All integumentary elements are within defined limits (Shoulder incision healing)  Patient Coping  Patient Coping: Accepting  Accompanied by  Accompanied by: Family Member    Past History:    History reviewed. No pertinent past medical history.     Physical Exam:    Recent Vitals 2/15/2017   Height -   Weight 245 lbs 10 oz   BSA (m2) -   /68   Pulse 70   Temp 97.9   Temp src 1   SpO2 98     General: patient appears stated age of 51 y.o.. Nontoxic and in no distress.   HEENT: Head: atraumatic, normocephalic. Sclerae anicteric.  Chest:  Normal respiratory effort.  Clear to auscultation bilaterally.  Cardiac:  +1 pitting edema in the legs bilaterally.  Regular rate and rhythm.  Abdomen: abdomen is soft, non-distended  Extremities: normal tone and muscle bulk.   Skin: no lesions or rash. Warm and dry.   CNS: alert and oriented x3. Grossly non-focal.   Psychiatric: normal mood and affect.     Lab Results:    Recent Results (from the past 168 hour(s))   Basic Metabolic Panel   Result Value Ref Range    Sodium 141 136 - 145 mmol/L    Potassium 4.4 3.5 - 5.0 mmol/L    Chloride 108 (H) 98 - 107 mmol/L    CO2 24 22 - 31 mmol/L    Anion Gap, Calculation 9 5 - 18 mmol/L    Glucose 137 (H) 70 - 125 mg/dL    Calcium 9.1 8.5 - 10.5 mg/dL    BUN 18 8 - 22 mg/dL    Creatinine 1.02 0.70 - 1.30 mg/dL    GFR MDRD Af Amer >60 >60 mL/min/1.73m2    GFR MDRD Non Af Amer >60 >60 mL/min/1.73m2   Thyroid Stimulating Hormone (TSH)   Result Value Ref Range    TSH 5.39 (H) 0.30 - 5.00 uIU/mL   POCT Glucose   Result Value Ref Range    Glucose,  mg/dL   Lactate Dehydrogenase (LDH)   Result Value Ref Range    LD  (LDH) 178 125 - 220 U/L   Comprehensive Metabolic Panel   Result Value Ref Range    Sodium 141 136 - 145 mmol/L    Potassium 4.3 3.5 - 5.0 mmol/L    Chloride 106 98 - 107 mmol/L    CO2 27 22 - 31 mmol/L    Anion Gap, Calculation 8 5 - 18 mmol/L    Glucose 129 (H) 70 - 125 mg/dL    BUN 14 8 - 22 mg/dL    Creatinine 1.12 0.70 - 1.30 mg/dL    GFR MDRD Af Amer >60 >60 mL/min/1.73m2    GFR MDRD Non Af Amer >60 >60 mL/min/1.73m2    Bilirubin, Total 0.3 0.0 - 1.0 mg/dL    Calcium 9.5 8.5 - 10.5 mg/dL    Protein, Total 6.0 6.0 - 8.0 g/dL    Albumin 3.9 3.5 - 5.0 g/dL    Alkaline Phosphatase 67 45 - 120 U/L    AST 20 0 - 40 U/L    ALT 27 0 - 45 U/L   HM1 (CBC with Diff)   Result Value Ref Range    WBC 5.5 4.0 - 11.0 thou/uL    RBC 4.20 (L) 4.40 - 6.20 mill/uL    Hemoglobin 11.9 (L) 14.0 - 18.0 g/dL    Hematocrit 36.6 (L) 40.0 - 54.0 %    MCV 87 80 - 100 fL    MCH 28.5 27.0 - 34.0 pg    MCHC 32.7 32.0 - 36.0 g/dL    RDW 13.7 11.0 - 14.5 %    Platelets 233 140 - 440 thou/uL    MPV 6.9 (L) 7.0 - 10.0 fL    Neutrophils % 71 (H) 50 - 70 %    Lymphocytes % 15 (L) 20 - 40 %    Monocytes % 10 2 - 10 %    Eosinophils % 4 0 - 6 %    Basophils % 0 0 - 2 %    Neutrophils Absolute 3.9 2.0 - 7.7 thou/uL    Lymphocytes Absolute 0.8 0.8 - 4.4 thou/uL    Monocytes Absolute 0.6 0.0 - 0.9 thou/uL    Eosinophils Absolute 0.2 0.0 - 0.4 thou/uL    Basophils Absolute 0.0 0.0 - 0.2 thou/uL     Imaging:    PET/CT scan images were personally reviewed.  These show increasing adenopathy in the mediastinum and abdomen, and also some bone lesions..    Nm Pet Ct Skull To Mid Thigh    Result Date: 2/13/2017  PET FDG/CT 2/13/2017 12:13 PM INDICATION: Lymphoma restaging, subsequent treatment strategy TECHNIQUE: Serum glucose level 104 mg/dL. One hour post left antecubital intravenous administration of 9.8 mCi F-18 FDG, PET imaging was performed from the skull base to the mid thighs utilizing attenuation correction with concurrent axial CT and PET/CT image  fusion. Dose reduction techniques were used. COMPARISON: Chest radiograph from 12/21/2016 is reviewed. FINDINGS: HEAD AND NECK: FDG avid bilateral submandibular, cervical, and supraclavicular lymphadenopathy. A representative right submandibular node measures 1.4 x 0.9 cm (SUVmax 4.8). Moderate generalized cerebral and cerebellar volume loss, likely age-related. CHEST: FDG avid right axillary, subcarinal, lower left paratracheal, lower right paratracheal, retrotracheal, upper left paratracheal, upper right paratracheal, and prevascular lymphadenopathy. A representative right axillary node measures 1.9 x 1.1 cm (SUVmax 4.8). Decreased attenuation of the blood pool relative to myocardium, suggesting anemia. ABDOMEN/PELVIS: Extensive, markedly FDG avid, in many cases confluent, mesenteric, devante hepatic, portacaval, retrocaval, aortocaval, left periaortic, bilateral retroperitoneal, bilateral common iliac, bilateral external iliac, and bilateral inguinal lymph nodes. Confluent mesenteric lymphadenopathy measures about 9 x 5 cm (SUVmax 8.2). Normal liver and spleen. Right kidney cyst. Trace calcified atherosclerosis. MUSCULOSKELETAL: Marked FDG activity in the posterior sonal bilaterally. Bone marrow FDG activity is otherwise normal. Mild hypertrophic change in the spine and sacroiliac joints.     CONCLUSION: 1.  Findings consistent with recurrent active lymphoma involving lymph nodes above and below the diaphragm. 2.  Intense FDG activity in the posterior sonal bilaterally is suspicious for active skeletal lymphoma, unless there has been recent bone marrow biopsy at these sites to incite significant inflammation.      Signed by: Rob Crooks MD

## 2021-06-08 NOTE — ANESTHESIA CARE TRANSFER NOTE
Last vitals:   Vitals:    05/28/20 1154   BP: (!) 182/88   Pulse: 65   Resp: 16   Temp: 36.5  C (97.7  F)   SpO2: 100%     Patient's level of consciousness is drowsy  Spontaneous respirations: yes  Maintains airway independently: yes  Dentition unchanged: yes  Oropharynx: oropharynx clear of all foreign objects    QCDR Measures:  ASA# 20 - Surgical Safety Checklist: WHO surgical safety checklist completed prior to induction    PQRS# 430 - Adult PONV Prevention: 4558F - Pt received => 2 anti-emetic agents (different classes) preop & intraop  ASA# 8 - Peds PONV Prevention: NA - Not pediatric patient, not GA or 2 or more risk factors NOT present  PQRS# 424 - [place]-op Temp Management: 4559F - At least one body temp DOCUMENTED => 35.5C or 95.9F within required timeframe  PQRS# 426 - PACU Transfer Protocol: - Transfer of care checklist used  ASA# 14 - Acute Post-op Pain: ASA14B - Patient did NOT experience pain >= 7 out of 10

## 2021-06-08 NOTE — PROGRESS NOTES
[facility] Hematology and Oncology Progress Note    Patient: Juvenal Blake  MRN: [id number]  Date of Service: June 9, 2020      Assessment and Plan:    1.  Follicular lymphoma: He recently had a right axillary lymph node biopsy.  I reviewed the pathology with him.  It is read as a grade 1-2 follicular lymphoma with potentially a second cell population.  It was recommended that a double hit FISH panel be sent.  This will be ordered.  I reviewed these findings with Juvenal.  This is his third relapse.  He had only a short response to O-CHOP, with radiographic progression 8 months after his sixth cycle.  I do not think more chemotherapy is going to be too beneficial for him.  I am going to refer him to the [facility] for consideration of autologous transplant for refractory follicular lymphoma or possibly cellular therapy.  Potentially clinical trial.  If they do not have any thing to offer him then I would try lenalidomide or ibritumomab.  We will follow-up with him to make a definitive plan after he is seen at the .    2.  Prostate cancer:  PSA is stable at low level. Checking every 3 months.  No clinical or radiographic evidence of recurrent/progressive prostate cancer.    3.  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  He continues on his monthly IVIG dosing.  Has not had any recent sinus infections.  He continues to follow-up with pulmonology.  We could certainly consider stopping this in the next month or 2 and seeing how he does clinically.    ECOG Performance   ECOG Performance Status: 1    Distress Assessment  Distress Assessment Score: 9(related to medical stuff)June 28, 2019    Pain  Currently in Pain: Yes  Pain Score (Initial OR Reassessment): 3  Location: lymph node biopsy site nder right arm    Diagnosis:    1.  Follicular lymphoma: Diagnosed in April, 2010 . Stage IV diagnosis with bone marrow  involvement.  Initial disease involved the cervical, supraclavicular, axillary, mesenteric mass, retroperitoneal nodes, and possibly the manubrium.  Relapse noted on imaging in December 2016.  Axillary node biopsy from February 2017 shows low-grade follicle center cell lymphoma.  Second relapse on imaging January 2019.  Repeat biopsy January 7, 2019 of a right medial thigh lymph node shows recurrent follicular lymphoma.  Grade 2.  Third relapse on imaging in April, 202. Pathologic confirmation May 28, 2020 from a right axillary lymph node excisional biopsy..     2.  Prostate cancer:  Pathologic stage T3b prostate cancer.  Positive margins, multiple, and surgery.  3 lymph nodes were negative.  High risk for recurrent disease.  He is being followed at [facility].  Maricel 4+3 = 7.  Tertiary Arcadia pattern 5 and preoperative PSA of 27.     3.  Hypogammaglobulinemia: He has received intermittent doses of IVIG.    Treatment:    Lymphoma:    He had 6 cycles of bendamustine and Rituxan completed in October, 2010.  He had 2 years of maintenance Rituxan therapy finishing in September, 2012.    Second course of bendamustine plus rituximab started February 20, 2017.  Cycle 6 was completed on July 11, 2017.  Then had maintenance rituximab through January, 2019.    O-CHOP started at second relapse.  Cycle 1 given April 23, 2019.  Cycle 6 given August 16, 2019.      Prostate:   Initial radical prostatectomy and bilateral lymph node dissection.  November 15, 2017.  Prostatic adenocarcinoma Arcadia 4+3 = 7 with tertiary pattern 5.  Tumor involves 45% of total surface area.  Positive for extensive extraprostatic extension.  Positive for bilateral seminal vesicle invasion.  Multiple margins positive.  Lymphovascular invasion not identified.  Perineural invasion was noted. Lymph nodes negative. 3 were examined (2 right obturator and 1 left obturator).  Completed radiation to the prostate fossa and pelvic lymph nodes at  [facility] early May 2018.  He received 4500 cGy in 25 fractions.  He then had 2340 cGy boost in 13 fractions to the prostatic fossa, for a total dose of 6240 cGy in 38 treatment fractions delivered over 54 days.  He did not receive hormonal therapy.    Interim History:    Jvuenal returns today for follow-up visit.  Here to review the results of a recent right axillary node biopsy.  Feeling okay.  No fevers, chills, night sweats.  No shortness of breath or chest pain.     Review of Systems:    Constitutional  Constitutional (WDL): Exceptions to WDL  Fatigue: Fatigue relieved by rest  Neurosensory  Neurosensory (WDL): All neurosensory elements are within defined limits  Cardiovascular  Cardiovascular (WDL): Exceptions to WDL  Edema: Yes  Edema Limbs: 5 - 10% inter-limb discrepancy in volume or circumference at point of greatest visible difference, swelling or obscuration of anatomic architecture on close inspection  Pulmonary  Respiratory (WDL): Within Defined Limits  Gastrointestinal  Gastrointestinal (WDL): Exceptions to WDL  Constipation: Occasional or intermittent symptoms, occasional use of stool softeners, laxatives, dietary modification, or enema(with pain meds)  Genitourinary  Genitourinary (WDL): All genitourinary elements are within defined limits  Integumentary  Integumentary (WDL): Exceptions to WDL(incisions still healing, arm incision leaking)  Patient Coping  Patient Coping: Accepting  Accompanied by  Accompanied by: Alone    Past History:    Past Medical History:   Diagnosis Date     Arthritis     shoulder per H & P     GERD (gastroesophageal reflux disease)      H/O pyloric stenosis     as an infant per H & P      Inguinal hernia     per H & P      Lazy eye     per H & P      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 Nontoxic and in no distress.   HEENT: Head: atraumatic, normocephalic. Sclerae anicteric.  Chest:  Normal respiratory effort  Cardiac:  No edema.   Abdomen: abdomen is non-distended  Extremities: normal tone and muscle bulk.  Skin: no lesions or rash. Warm and dry.  Right axilla was examined.  There is a firm but seems like fluid collection under the scar, most likely lymph.  No fluctuance.  No pus.  No erythema or warmth.  CNS: alert and oriented. Grossly non-focal.   Psychiatric: normal mood and affect.     Lab Results:    No results found for this or any previous visit (from the past 240 hour(s)).     Imaging:    Nm Pet Ct Skull To Mid Thigh    Result Date: 5/18/2020  EXAM: NM PET CT SKULL TO MID THIGH LOCATION: [facility] DATE/TIME: 5/18/2020 12:34 PM INDICATION: Subsequent treatment planning and restaging for follicular lymphoma grade 1, lymph nodes of multiple sites. Status post chemotherapy. Monitor treatment response COMPARISON: CT the chest abdomen pelvis dated 04/06/2020 and FDG PET/CT dated 09/11/2019 TECHNIQUE: Serum glucose level 83 mg/dL. One hour post intravenous administration of 9 point mCi F-18 FDG, PET imaging was performed from the skull base to the mid thighs utilizing attenuation correction with concurrent axial CT and PET/CT image fusion. Dose reduction techniques were used. FINDINGS: FDG avid bilateral supraclavicular (max SUV 13.9), right axillary (max SUV 13.5), right lower paratracheal station 4R (max SUV 5.8), bilateral retroperitoneal (max SUV 9.4), right common iliac (max SUV 8.7), right external iliac (max SUV 4.7), and right inguinal (max SUV 14.3) lymph nodes suspicious for lymphomatous involvement. Mild senescent intracranial changes. Mild paranasal sinus because of thickening. Right chest port with tip terminating  near the superior cavoatrial junction. Mild coronary artery calcium. Right renal cysts. Mild stranding within the central mesentery. Prostatectomy. Pelvic phleboliths. Remote left posterior sixth rib fracture. Multilevel degenerative changes of the spine.     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    Pathology:    MICROSCOPIC AND DIAGNOSIS:   RIGHT AXILLARY LYMPH NODE, BIOPSY:        - LOW GRADE (GRADE 1-2/3) FOLLICULAR LYMPHOMA        - CD10 POSITIVE KAPPA LIGHT CHAIN RESTRICTED MONOCLONAL B-CELL   POPULATION          (, [facility])        - RECOMMEND DUAL HIT FISH PANEL     COMMENT:   The clinical history has been reviewed. Histological sections   demonstrate an atypical lymphoid infiltrate composed predominantly of   centrocytes with fewer numbers of centroblasts.  The tumor cells form   irregular lymphoid follicles.     The tumor cells are positive for CD20, bcl-6, bcl-2, and CD10 and   colocalize with CD21/CD23 positive follicular dendritic cell meshworks.   The proliferation rate by Ki67 outside of germinal centers is   approximately 20%.     Cyclin D1 is negative arguing against mantle cell lymphoma. MUM1   demonstrates scattered staining, nonspecific.  CD5 is negative arguing   against CLL/SLL.     CD30, CD15 and EBV in situ hybridization studies are negative for   classical Hodgkin lymphoma.  S100is negative for dermatopathic   lymphadenopathy and Rosai-Rafael disease.  Marcos and UDAY stains are   negative for carcinoma.  CD3 highlights background T-cells.       Signed by: Rob Crooks MD

## 2021-06-08 NOTE — PROGRESS NOTES
Pt came into infusion clinic for his IVIG as ordered. Port patent throughout infusion. Pt tolerated infusion with no complications. Pt left infusion clinic via ambulatory and will RTC as sched.

## 2021-06-08 NOTE — PROGRESS NOTES
[facility] Hematology and Oncology Progress Note    Patient: Juvenal Blake  MRN: [id number]  Date of Service:  May 19 , 2020         The patient has chosen to have the visit conducted as a telephone visit, to reduce risk of exposure given the current status of Coronavirus in our community. This telephone visit is being conducted via a call between the patient and physician/provider. Health care needs are being provided without a physical exam.     The patient has been notified of following:      We have found that certain health care needs can be provided without the need for a physical exam.  This service lets us provide the care you need with a short phone conversation.  If a prescription is necessary we can send it directly to your pharmacy.  If lab work is needed we can place an order for that and you can then stop by our lab to have the test done at a later time.  If during the course of the call the physician/provider feels a telephone visit is not appropriate, you will not be charged for this service    The patient consents to a telephone visit.     Assessment and Plan:    1.  Follicular lymphoma: PET scan images reviewed.  Show progressive disease.  Images reviewed with Juvenal and his wife. I would like to get a biopsy to evaluate for transformation. I am also going to refer to [facility] for a transplant evaluation. He is now about 9 months out from completion of his most recent treatment.    2.  Prostate cancer:  PSA is stable at low level. Checking every 3 months.     3.  Immunodeficiency: He continues on his monthly IVIG dosing.  Has not had any recent sinus infections.  He continues to follow-up with pulmonology.    Total time on the phone was 18 minutes,     ECOG Performance   ECOG Performance Status: 0    Distress Assessment  Distress Assessment Score: No distressJune 28, 2019    Pain  Currently in Pain: No/denies    Diagnosis:    1.  Follicular lymphoma: Diagnosed in April, 2010 . Stage IV diagnosis with  bone marrow involvement.  Initial disease involved the cervical, supraclavicular, axillary, mesenteric mass, retroperitoneal nodes, and possibly the manubrium.  Relapse noted on imaging in December 2016.  Second relapse on imaging January 2019.  Repeat biopsy January 7, 2019 shows recurrent low-grade follicular lymphoma.  Grade 2.    2.  Prostate cancer:  Pathologic stage T3b prostate cancer.  Positive margins, multiple, and surgery.  3 lymph nodes were negative.  High risk for recurrent disease.  He is being followed at [facility].  [place] 4+3 = 7.  Tertiary Maricel pattern 5 and preoperative PSA of 27.     3.  Hypogammaglobulinemia: He has received intermittent doses of IVIG.    Treatment:    Lymphoma:    He had 6 cycles of bendamustine and Rituxan completed in October, 2010.  He had 2 years of maintenance Rituxan therapy finishing in September, 2012.  Second course of bendamustine plus rituximab started February 20, 2017.  Cycle 6 was completed on July 11, 2017.  Then had maintenance rituximab.    O-CHOP started at second relapse.  Cycle 1 given April 23, 2019.  Cycle 6 given August 16, 2019.    Prostate:   Initial radical prostatectomy and bilateral lymph node dissection.  November 15, 2017.  Prostatic adenocarcinoma Maricel 4+3 = 7 with tertiary pattern 5.  Tumor involves 45% of total surface area.  Positive for extensive extraprostatic extension.  Positive for bilateral seminal vesicle invasion.  Multiple margins positive.  Lymphovascular invasion not identified.  Perineural invasion was noted. Lymph nodes negative. 3 were examined (2 right obturator and 1 left obturator).  Completed radiation to the prostate fossa and pelvic lymph nodes at [facility] early May 2018.  He received 4500 cGy in 25 fractions.  He then had 2340 cGy boost in 13 fractions to the prostatic fossa, for a total dose of 6240 cGy in 38 treatment fractions delivered over 54 days.  He did not receive hormonal  "therapy.    Interim History:    Juvenal returns today for follow-up visit.  This was a telephone visit. He is here to review PET results.  He is feeling ok. No acute complaints.    Review of Systems:    Constitutional  Constitutional (WDL): All constitutional elements are within defined limits  Neurosensory  Neurosensory (WDL): All neurosensory elements are within defined limits  Cardiovascular  Cardiovascular (WDL): Exceptions to WDL  Edema: Yes  Pulmonary  Respiratory (WDL): Within Defined Limits  Gastrointestinal  Gastrointestinal (WDL): All gastrointestinal elements are within defined limits  Genitourinary  Genitourinary (WDL): All genitourinary elements are within defined limits  Integumentary  Integumentary (WDL): All integumentary elements are within defined limits  Patient Coping  Patient Coping: Accepting  Accompanied by  Accompanied by: Family Member(wife Nancy)    Past History:    Past Medical History:   Diagnosis Date     Arthritis     shoulder per H & P     GERD (gastroesophageal reflux disease)      H/O pyloric stenosis     as an infant per H & P      Inguinal hernia     per H & P      Lazy eye     per H & P      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 Shows recurrent disease.     Nm Pet Ct Skull To Mid Thigh    Result Date: 5/18/2020  EXAM: NM PET CT SKULL TO MID THIGH LOCATION: [facility] DATE/TIME: 5/18/2020 12:34 PM INDICATION: Subsequent treatment planning and restaging for follicular " lymphoma grade 1, lymph nodes of multiple sites. Status post chemotherapy. Monitor treatment response COMPARISON: CT the chest abdomen pelvis dated 04/06/2020 and FDG PET/CT dated 09/11/2019 TECHNIQUE: Serum glucose level 83 mg/dL. One hour post intravenous administration of 9 point mCi F-18 FDG, PET imaging was performed from the skull base to the mid thighs utilizing attenuation correction with concurrent axial CT and PET/CT image fusion. Dose reduction techniques were used. FINDINGS: FDG avid bilateral supraclavicular (max SUV 13.9), right axillary (max SUV 13.5), right lower paratracheal station 4R (max SUV 5.8), bilateral retroperitoneal (max SUV 9.4), right common iliac (max SUV 8.7), right external iliac (max SUV 4.7), and right inguinal (max SUV 14.3) lymph nodes suspicious for lymphomatous involvement. Mild senescent intracranial changes. Mild paranasal sinus because of thickening. Right chest port with tip terminating near the superior cavoatrial junction. Mild coronary artery calcium. Right renal cysts. Mild stranding within the central mesentery. Prostatectomy. Pelvic phleboliths. Remote left posterior sixth rib fracture. Multilevel degenerative changes of the spine.     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      Signed by: Rob Crooks MD

## 2021-06-08 NOTE — PROGRESS NOTES
Received paperwork for upcoming hernia surgery on 05/28/2020.  Will complete and put in [facility] folder for a signature.  Fax to [facility] ATTN: TONIO #[phone]

## 2021-06-08 NOTE — LETTER
6/8/2021         RE: Juvenal Blake  [address] [place] [address]        Dear Colleague,    Thank you for referring your patient, Juvenal Blake, to the [facility] BLOOD AND MARROW TRANSPLANT PROGRAM [place]. Please see a copy of my visit note below.    BMT ONC Adult Bone Marrow Biopsy Procedure Note  June 8, 2021  /64   Pulse 80   Resp 16   SpO2 98%      Learning needs assessment complete within 12 months?  Unknown     DIAGNOSIS: Follicular lymphoma     PROCEDURE: Unilateral Bone Marrow Biopsy and Unilateral Aspirate    LOCATION: [facility] 2nd Floor    Patient s identification was positively verified by verbal identification and invasive procedure safety checklist was completed. Informed consent was obtained. Patient was placed in the prone position and prepped and draped in a sterile manner. Approximately 15 cc of 1% Lidocaine was used over the left posterior iliac spine. Following this a 3 mm incision was made. Trephine bone marrow core(s) was (were) obtained from the [facility]. Bone marrow aspirates were obtained from the [facility]. Aspirates were sent for morphology, immunophenotyping, cytogenetics and molecular diagnostics RFLP. A total of approximately 20 ml of marrow was aspirated. Following this procedure a sterile dressing was applied to the bone marrow biopsy site(s). The patient was placed in the supine position to maintain pressure on the biopsy site. Post-procedure wound care instructions were given.     Complications: YES, difficult aspirate collection, unable to obtain aspirate (could not obtain aspirate into dry collection syringe)    Pre-procedural pain: 0 out of 10 on the numeric pain rating scale.     Procedural pain: 5 out of 10 on the numeric pain rating scale.     Post-procedural pain assessment: 0 out of 10 on the numeric pain rating scale.     Interventions: NO    Length of procedure:20 minutes or less      Procedure performed by: Narcisa Stewart PAC        Again, thank you for  allowing me to participate in the care of your patient.        Sincerely,        [facility]

## 2021-06-08 NOTE — TELEPHONE ENCOUNTER
Spoke with Juvenal today regarding the COVID testing for his upcoming surgery. He was scheduled at [facility] because the timing worked better for him, however, he is concerned that the results will not be back and his surgery would be canceled. He has agreed to get his testing done at [facility] location.    Joanna Franz  [facility], General Surgery  Surgery Scheduler  [phone] (Direct Line)  [phone] (Main Line)

## 2021-06-08 NOTE — BRIEF OP NOTE
[facility] Radiology Brief Op Note    IR PORT PLACEMENT >5 YEARS  Procedure Note    Name:  Juvenal Blake  PCP:  Cole Sandoval MD  Procedure Date: 2/17/2017       Post-Procedure Diagnosis:  1. Follicular lymphoma         Findings:    Rt IJ port.    Additional Staff:  --    Estimated Blood Loss:   Minimal    Specimens:    none    Complications:    None      Mukul Duenas     Date: 2/17/2017  Time: 12:17 PM

## 2021-06-08 NOTE — PROGRESS NOTES
"Juvenal Blake is a 54 y.o. male who is being evaluated via a billable telephone visit lymphoma.     The patient has been notified of following:     \"This telephone visit will be conducted via a call between you and your physician/provider. We have found that certain health care needs can be provided without the need for a physical exam.  This service lets us provide the care you need with a short phone conversation.  If a prescription is necessary we can send it directly to your pharmacy.  If lab work is needed we can place an order for that and you can then stop by our lab to have the test done at a later time.    Telephone visits are billed at different rates depending on your insurance coverage. During this emergency period, for some insurers they may be billed the same as an in-person visit.  Please reach out to your insurance provider with any questions.    If during the course of the call the physician/provider feels a telephone visit is not appropriate, you will not be charged for this service.\"    Patient has given verbal consent to a Telephone visit? yes        Phone call duration: 10 minutes    Lu Moreland RN    "

## 2021-06-08 NOTE — TELEPHONE ENCOUNTER
Fax received for additional records needed for the referral sent by Dr. Crooks for the [facility] Bone & Marrow Transplant Program.    Pt has an appointment scheduled for Thursday, June 18th via Telephone at 2:30PM with Dr. Gage Corral.     91 pages were sent to [phone] successfully.

## 2021-06-08 NOTE — TELEPHONE ENCOUNTER
[place] paperwork faxed to [facility] for pt wife Nancy, attn: Meagan iglesias @ fax [phone].

## 2021-06-08 NOTE — PROGRESS NOTES
BMT ONC Adult Bone Marrow Biopsy Procedure Note  June 8, 2021  /64   Pulse 80   Resp 16   SpO2 98%      Learning needs assessment complete within 12 months?  Unknown     DIAGNOSIS: Follicular lymphoma     PROCEDURE: Unilateral Bone Marrow Biopsy and Unilateral Aspirate    LOCATION: [facility] 2nd Floor    Patient s identification was positively verified by verbal identification and invasive procedure safety checklist was completed. Informed consent was obtained. Patient was placed in the prone position and prepped and draped in a sterile manner. Approximately 15 cc of 1% Lidocaine was used over the left posterior iliac spine. Following this a 3 mm incision was made. Trephine bone marrow core(s) was (were) obtained from the [facility]. Bone marrow aspirates were obtained from the IC. Aspirates were sent for morphology, immunophenotyping, cytogenetics and molecular diagnostics RFLP. A total of approximately 20 ml of marrow was aspirated. Following this procedure a sterile dressing was applied to the bone marrow biopsy site(s). The patient was placed in the supine position to maintain pressure on the biopsy site. Post-procedure wound care instructions were given.     Complications: YES, difficult aspirate collection, unable to obtain aspirate (could not obtain aspirate into dry collection syringe)    Pre-procedural pain: 0 out of 10 on the numeric pain rating scale.     Procedural pain: 5 out of 10 on the numeric pain rating scale.     Post-procedural pain assessment: 0 out of 10 on the numeric pain rating scale.     Interventions: NO    Length of procedure:20 minutes or less      Procedure performed by: Narcisa JOHNSON

## 2021-06-08 NOTE — PROGRESS NOTES
Pt has walked in saying he received a message from Joanna Franz about his paperwork being signed and sent to the insurance company. Patient would also like it faxed to work. Care of [facility] Crabtree at [phone].

## 2021-06-08 NOTE — PROGRESS NOTES
"Juvenal arrived A&OX4 ambulatory and stable, confirms he is here for IVIG to treat chronic sinusitis. Seated in chair #5.  He previously had ongoing sinus congestion, chest tightness and cough that is sometimes productive but notes much improvement of his symptoms but recently feeling like he is \"getting another cold\". He denies fevers. POC/medication education reviewed, pt stated understanding and agreed to plan. Port accessed with ease, line had excellent blood return and line easily flushed. VS WNL.  No pre meds ordered  1503-2041 IVIG 40g infused at titrated rate per protocol; Juvenal tolerated infusion w/o sxs adverse reaction.  Line flushed NS and pt monitored 30min post infusion. VSS   port flushed NS20mL, instilled w heparin 6mL 1:100 and gripper needle dc'd, site covered w gauze and papertape.  Dc education/AVS reviewed, pt stated understanding and that his needs were met today.  1310 Juvenal katz'breann A&Ox4 ambulatory and stable, will schedule future appointments for every 28days per MD orders. He will see Dorothy 5/18 for follow up after imaging.  "

## 2021-06-08 NOTE — NURSING NOTE
Chief Complaint   Patient presents with     Bone Marrow Biopsy     Follicular Lymphoma~ here for bmbx     BMT Teaching Flowsheet        Teaching Topic: bmbx    Person(s) involved in teaching: Patient  Motivation Level  Asks Questions: Yes  Eager to Learn: Yes  Cooperative: Yes  Receptive (willing/able to accept information): Yes  Any cultural factors/Protestant beliefs that may influence understanding or compliance? No    Patient demonstrates understanding of the following:  - Reason for the appointment, diagnosis and treatment plan: Yes  - Knowledge of proper use of medications and conditions for which they are ordered (with special attention to potential side effects or drug interactions): Yes  - Which situations necessitate calling provider and whom to contact: Yes    Teaching concerns addressed: activity level, pain management, site care      Time spent with patient: 30 minutes.    Specific Concerns: No, explain: Patient did not received Versed.  Tolerated well.  Patient had no complaints of pain post procedure.  Dressing is clean, dry, and intact.  Vital signs are stable.

## 2021-06-09 ENCOUNTER — VIRTUAL VISIT (OUTPATIENT)
Dept: TRANSPLANT | Facility: CLINIC | Age: 56
End: 2021-06-09
Payer: COMMERCIAL

## 2021-06-09 ENCOUNTER — ALLIED HEALTH/NURSE VISIT (OUTPATIENT)
Dept: TRANSPLANT | Facility: CLINIC | Age: 56
End: 2021-06-09
Payer: COMMERCIAL

## 2021-06-09 ENCOUNTER — HOSPITAL ENCOUNTER (OUTPATIENT)
Facility: AMBULATORY SURGERY CENTER | Age: 56
End: 2021-06-09
Attending: PHYSICIAN ASSISTANT
Payer: COMMERCIAL

## 2021-06-09 DIAGNOSIS — C82.08 FOLLICULAR LYMPHOMA GRADE I, LYMPH NODES OF MULTIPLE SITES (H): ICD-10-CM

## 2021-06-09 DIAGNOSIS — Z71.9 VISIT FOR COUNSELING: Primary | ICD-10-CM

## 2021-06-09 DIAGNOSIS — Z11.59 ENCOUNTER FOR SCREENING FOR OTHER VIRAL DISEASES: ICD-10-CM

## 2021-06-09 DIAGNOSIS — C82.08 FOLLICULAR LYMPHOMA GRADE I, LYMPH NODES OF MULTIPLE SITES (H): Primary | ICD-10-CM

## 2021-06-09 DIAGNOSIS — Z86.2 PERSONAL HISTORY OF DISEASES OF BLOOD AND BLOOD-FORMING ORGANS: ICD-10-CM

## 2021-06-09 LAB
COPATH REPORT: NORMAL
COPATH REPORT: NORMAL
INTERPRETATION ECG - MUSE: NORMAL

## 2021-06-09 ASSESSMENT — PATIENT HEALTH QUESTIONNAIRE - PHQ9
SUM OF ALL RESPONSES TO PHQ QUESTIONS 1-9: 8
5. POOR APPETITE OR OVEREATING: NOT AT ALL

## 2021-06-09 ASSESSMENT — ANXIETY QUESTIONNAIRES
7. FEELING AFRAID AS IF SOMETHING AWFUL MIGHT HAPPEN: NOT AT ALL
2. NOT BEING ABLE TO STOP OR CONTROL WORRYING: NOT AT ALL
GAD7 TOTAL SCORE: 3
1. FEELING NERVOUS, ANXIOUS, OR ON EDGE: NEARLY EVERY DAY
6. BECOMING EASILY ANNOYED OR IRRITABLE: NOT AT ALL
5. BEING SO RESTLESS THAT IT IS HARD TO SIT STILL: NOT AT ALL
3. WORRYING TOO MUCH ABOUT DIFFERENT THINGS: NOT AT ALL

## 2021-06-09 NOTE — PROGRESS NOTES
Pt here for txt cycle 1 day 2. PT states some stomach upset/nausea yesterday evening relieved with compazine. PT states bowels are slow and I recommended ducolax one tablet at hs. PT reports fatigue but no worse than yesterday. Port accessed sterile technique, brisk blood return/smooth ns flush. Txt reviewed and administered as ordered.tubing flushed with ns then port flushed with heparin/deaccessed with 2x2 to site. Follow up reviewed and pt dc'd steady gait

## 2021-06-09 NOTE — TELEPHONE ENCOUNTER
"Call placed to patient in response to both a message left on the triage line as well as a message sent via [employer].  Patient asked about [facility] paperwork and was previously advised by triage to pursue that with his new doctor at the [facility] transplant as they are directing his care.  Secondly patient wondered if he needed to be concerned about the \"lymph node cluster in his chest that they saw on the last PET scan\", advised that both Dr yip and the BMT clinic have reviewed the scan and if they felt further workup was needed they would have placed the orders.  Patient wonders if the lymph nodes in the chest are \"the same cancer as the ones they tested in the under arm area\".  Advised patient again, we would have to biopsy to know for sure, however, if the doctors felt there was more to be concerned about they would investigate this as well. Patient was satisfied with these answers.    "

## 2021-06-09 NOTE — PROGRESS NOTES
Blood and Marrow Transplant   Psychosocial Assessment with   Clinical     Assessment completed on 6/9/21 via phone as part of the COVID 19 protocol. Assessment of living situation, support system, financial status, functional status, coping, stressors, need for resources and social work intervention provided as needed. Information for this assessment was provided by pt's report in addition to medical chart review and consultation with medical team.     Present at Assessment:   Patient: Juvenal Blake  : CITLALLI Cunningham LGSW    Diagnosis: NHL     Date of Diagnosis: 2019    Cellular therapy type: FATE 516    Physician: Rosa Terry MD    Nurse Coordinator: Arlen Lui RN    Social Workers: CITLALLI Cunningham LGSW    Permanent/Local Address:   [address] [place] [address]    Living Situation: Pt lives in [place], [place] w/ his wife Nancy.    Local Address: Pt lives in [place], [place] which is within the required distance of the hospital. Pt does not need to relocate.     Contact Information:  Cell Phone: [phone]  Home Phone: [phone]  Pt Email: [email or web address]  Wife's Cell Phone: [phone]    Presenting Information:  Juvenal Blake is a 55 year old male diagnosed with NHL who presents for evaluation for a FATE 516 cellular infusion at the [facility] ([facility] Pt presented alone to today's visit.     Decision Making: Self    Health Care Directive:  Yes. Pt has a health care directive and will bring it when they return to the BMT program.     Relationship Status: . Pt described relationship as stable/supportive.     Special Needs:   Pt endorsed he would like his BMT team to be aware that his wife was recently found to have breast cancer. She underwent a lumpectomy and is anticipated to have another lumpectomy in the near future with plan for 6 weeks of radiation M-F. They endorse additional family support as needed (2 sons  local). He states his wife's second lumpectomy is being re-scheduled to accommodate his treatment needs. He appreciates assistance in schedule communication so they can coordinate both of their needs.     Family/Support System: Pt endorsed a strong/stable support system including family and close friends who will be available to support pt throughout transplant process.     Family Information:   Spouse: Nancy Blake  Children: 2 biological sons (Juvenal the 2nd & Gwyn) and 2 step sons   Parents:   Siblings:  3 siblings- 1 brother (lives out of state) and 2 sisters    Caregiver: BERNIE discussed with pt the caregiver role and expectation at length. Pt is agreeable to having a full time caregiver for a minimum of 30 days until cleared by the BMT physician. Pt confirmed understanding of the caregiver requirement. Pt's primary caregiver will be his wife Nancy with his two local sons as a secondary or back-up to assist as needed. Caregiver education and resources provided. No caregiver concerns identified.     Caregiver Contact Information:  Nancy Blake (wife) - Cell Phone: [phone]  Juvenal Blake (son) - Cell Phone: [phone]  Gwyn Blake (son) - Cell Phone: [phone]    Transportation Mode: Personal Vehicle. Pt is aware of driving restrictions post-BMT and the need for the caregiver is to drive until cleared to drive by the BMT physician. SW provided information on parking info and monthly parking pass options.     Insurance: Pt has [employer] health insurance. Pt denied specific insurance concerns at this time. BERNIE reiterated information about the [employer]  should specific insurance questions arise as pt moves through transplant process. Future Insurance questions referred to [facility] -Yesenia Olivas - # [phone].    Sources of Income: Pt and spouse are supported by spouse's employment income, pension, and SSDI. Pt denied anticipation of financial  "hardship related to BMT at this time.  provided information on ryan options and encouraged pt to contact this  for support should financial situation change. Pt did state \"if things go sour for me, I'd like assistance looking at aid for my brother flying in out of state if possible.\" CSW informed Pt to reach out to CSW in the event of this need and ryan options will be reviewed.    Employment:   Currently employed: No; Pt has not worked since last year due to treatment and side effects. Pt states he is currently receiving SSDI and his pension.  Employer: [employer]  Occupation:      Spouse/Partner Employed: Yes - has summers off; last day of school 6/11/21.  Employer: [employer]  Occupation: Teacher    Mental Health: Pt reports a hx of anxiety. Pt states he has not utilized medications and does not feel the need for that at this time. SW explained that it's not uncommon for patients going through transplant to experience symptoms of depression/anxiety. Pt believes he would be able to identify symptoms of his depression/anxiety throughout the BMT process.     Juvenal identified \"feeling all of the emotions\" related to his recent relapse and states he was not provided a \"good\" prognosis. He processed his mortality and what is important to him. He has been working on gathering his affairs in order and worrying about his wife. He states he doesn't \"like to think about it too much and keep moving forward.\"     PHQ-9:  Pt scored a 8 which indicates \"mild\" on the depression severity scale. Pt endorses this is an accurate reflection of his emotional state.    GAD7:  Pt scored a 3 which indicates no sign of anxiety on the Generalized Anxiety Disorder Questionnaire. Pt endorses that he is currently feeling anxiety daily; though states he feels that he is coping appropriately.     Chemical Use:   Chemical use identified. Juvenal notes that he drinks alcohol daily- (3-4 mixed drinks) and " "uses marijuana daily. Juvenal notes that the marijuana use is to help with his nausea. Discussed the need to abstain from these during the BMT process. Pt states he was not informed to abstain during his last treatment and had not been informed at this time. CSW informed Pt that Dr. Terry will be notified to discuss with him further (notified 6/9/21).     Trauma/Loss/Abuse History: Multiple losses associated with cancer diagnosis and treatment, including health, employment, changes to physical appearance, etc. Pt also discussed grief related to his wife's diagnosis of breast cancer.     Spirituality: Patient does not identify with martha community.  Wife is Confucianist and at times attend with her, but was raised Confucianism.     Coping: Pt noted that he is currently feeling \"feeling all of the emotions\" related to his recent relapse and states he was not provided a \"good\" prognosis. Pt continues to be hopeful that the FATE 516 will be an effective treatment for him. He states he made an appointment / [facility] (7/1/21) for a second opinion (if needed). Pt described feeling that he needs to \"explore all of [his] options.\" Pt shared that currently his main coping mechanism is \"not dwelling too long\" on his current feelings, getting his affairs in order, and talking with his wife. Pt also shared that he enjoys gardening, riding his motorcycle, going on walks, watching movies, and working on a scanning/uploading photos project.SW and pt discussed additional positive coping mechanisms that pt can utilize while in the hospital. While hospitalized, pt plans to watch movies.     Caregiver Coping: Not assessed during this process due to Nancy not being available.    Education Provided: Transplant process expectations, Caregiver requirements, Caregiver self-care, Financial issues related to transplant, Financial resources/grants available, Common psychosocial stressors pre/post transplant, Support group(s) available, Hospital " resources available, Web site information, Social Work role and Resources for children/siblings    Interventions Provided: Psychosocial Support and Education     Assessment and Recommendations for Team:  Pt is a 55 year old male diagnosed with NHL who is here undergoing preparation for a planned FATE 516 cellular infusion.     Pt is pleasant, calm and able to articulate concerns/coping mechanisms in an appropriate manner. During our meeting pt was alert, he was interactive, affect was full, he displayed appropriate memory and thought processes. Pt feels comfortable communicating with the medical team. Pt has a strong supportive network of family and friends who are involved.  Pt and pt's wife will benefit from ongoing psychosocial support in regards to coping with the adjustment to the BMT process as well as other psychosocial stressors including his wife's own treatment needs related to breast cancer.    Pt has a stable support system and a confirmed caregiver plan. Pt verbalizes understanding of the cellular therapy process and wanting to proceed. SW provided contact information and encouraged pt to contact SW with questions, concerns, resources and for support.    CSW notified Dr. Terry 6/9/21 regarding Pt's current chemical use to review and discuss further.     Important Information:   -Pt endorses current chemical use; BMT MD notified 6/9/21.     Follow up Planned:   Psychosocial support    CITLALLI Cunningham, EMIL  Adult Blood & Marrow Transplant   Phone: [phone]  Pager: [phone]

## 2021-06-09 NOTE — PROGRESS NOTES
Patient arrived ambulatory, accompanied by wife, after seeing NP.  [facility].  Given premeds PO/IVP/IVPB as ordered.  Port flushed with NS between drugs.  Rituxan initiated at 50cc/hr for 30 minutes and increased by 50cc every 30 minutes to end of 300cc/hr for 30 minutes without problems.  Patient came back from the bathroom and was complaining of itching and flushing.  Rituxan stopped.  NS infusing.  After a few minutes, patient stated he felt better already.  Writer talked with NICKOLAS Hendrickson and Benadryl 12.5mg IVP given.  Since symptoms had already subsided before the Benadryl was given, Rituxan restarted after 10 minutes at rate of 150cc/hr for 30 minutes.  Rate increased by 50cc/hr to 300cc/hr.  Rituxan infusion completed without further problems.  Port flushed with NS.  Bendamustine IVPB given over 10 minutes.  Port flushed with NS.  Heparin instilled and needle dced.  Dressing applied.  Patient left ambulatory, by self.  Instructed to call with questions/concerns/problems.  Patient verbalized understanding.  Will return tomorrow for treatment.

## 2021-06-09 NOTE — PROGRESS NOTES
Pharmacy Assessment - Pre-Stem Cell Transplant    Assessments & Recommendations:  1) Granisetron in place of ondansetron (itching) given the zofran allergy. Tolerates Kytril .   2) Holding Idelalisid prior to starting the phase study?   3) Received Rituxan with the Lymphodepletion day -4.     History of Present Illness:  Juvenal Blake is a 55 year old year old male diagnosed with NHL.  He has been treated with Bendamustine + Rituxan then Bendamustine +  Rituxan once again the R-CHOP followed by Nam-NK cell infusion. He is now being work up for [place] 516 infusions(debulking)  on protocol US3903-42, which utilizes Cy/Flu as a lymphodepletion regimen as bridge to Yescarta.    Pertinent labs/tests:  Viral Testing:  CMV(+) / HSV(+/+) / EBV(+) / VZV (?)  Ejection Fraction: 60% (6/7)  QTc: 440msec (6/7)    Weights:   Wt Readings from Last 3 Encounters:   06/04/21 112.1 kg (247 lb 1.6 oz)   05/27/21 114.1 kg (251 lb 9.6 oz)   04/22/21 115.4 kg (254 lb 6 oz)   Ideal body weight: 69.6 kg (153 lb 5.6 oz)  Adjusted ideal body weight: 86.6 kg (190 lb 13.6 oz)  % IBW:  160  There is no height or weight on file to calculate BMI.    Primary BMT Physician:   BMT RN Coordinator:  Arlen Lui    Past Medical History:  Past Medical History:   Diagnosis Date     Cancer (H)      Non Hodgkin's lymphoma (H)      Shortness of breath        Medication Allergies:  Allergies   Allergen Reactions     Ondansetron Itching       Current Medications (pre-admit):  Current Outpatient Medications   Medication Sig Dispense Refill     allopurinol (ZYLOPRIM) 300 MG tablet Take 1 tablet (300 mg) by mouth daily 30 tablet 3     azelastine (ASTELIN) 0.1 % nasal spray USE 1 SPRAY IN EACH NOSTRIL TWICE DAILY AS DIRECTED       fluticasone (FLONASE) 50 MCG/ACT nasal spray SHAKE LIQUID AND USE 2 SPRAYS IN EACH NOSTRIL EVERY DAY       hydrOXYzine (ATARAX) 50 MG tablet Take 1 tablet (50 mg) by mouth 3 times daily as needed for itching (Patient not  taking: Reported on 5/27/2021) 30 tablet 0     medical cannabis (Patient's own supply)        omeprazole (PRILOSEC) 20 MG DR capsule Take 1 capsule (20 mg) by mouth daily       Psyllium (METAMUCIL FIBER) 51.7 % PACK Take 1 packet by mouth daily        tolterodine ER (DETROL LA) 4 MG 24 hr capsule Take 4 mg by mouth       ZYDELIG 150 MG tablet Take 1 tablet (150 mg) by mouth 2 times daily         Herbal Medication/Nutritional Supplements:  Daily multivitamin    Smoking/Past Drug Use:  Currently smokes marijuana and medical cannabis not licensed in [place]     Nausea/Vomiting, Pain, or other issues:  No issues     Summary:  I met with Juvenal Blake for approximately 30 minutes.  We discussed his current and phase study medications including the [place] 516 cells and IL-2, Lymphodepletion chemotherapy, antiemetics and miscellaneous premedications .

## 2021-06-09 NOTE — PROGRESS NOTES
[facility] Hematology and Oncology Progress Note    Patient: Juvenal Blake  MRN: [id number]  Date of Service: 03/06/2017        Reason for Visit    Chief Complaint   Patient presents with     HE Cancer       Assessment and Plan    1.  Follicular lymphoma, low-grade.  Patient now has recurrent disease that is causing issues.  He has restarted on treatment with bendamustine and Rituxan.  He has had his first cycle 2 weeks ago.  He did have issues with Rituxan reaction.  He was able to get the entire dose.  He has had no allergic reaction symptoms after that.  She will return in 2 weeks for cycle 2.  I did instruct him that we will likely do 3 cycles and then either a CT scan or a PET scan.  His LD really was not elevated so I do not think that that will be helpful.  I did review patient's lymph node biopsy that does show follicular lymphoma similar to his previous disease.    2.  Dyspepsia and nausea: This is from the chemotherapy.  Zofran has not really been helpful.  I encouraged him to try simin or peppermint products.  Encouraged him to get some Maalox or Tums.  I will stop the Zofran and try Compazine.  Patient is interested in medical cannabis I will make a referral.    3. 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  The chemo is usually cumulative. Overall asymptomatic. Continue to monitor.     ECOG Performance   ECOG Performance Status: 2    Distress Assessment  Distress Assessment Score: No distress    Pain  Currently in Pain: Yes  Pain Score (Initial OR Reassessment): 5  Location: bilateral shoulders      Problem List    1. Follicular lymphoma grade i, lymph nodes of multiple sites     2. Dyspepsia     3. Chemotherapy-induced nausea     4. Anemia, chronic disease        ______________________________________________________________________________    History of Present Illness    Diagnosis:     1. Follicular lymphoma: Diagnosed in April, 2010 . Stage IV diagnosis with bone marrow involvement. Initial  disease involved the cervical, supraclavicular, axillary, mesenteric mass, retroperitoneal nodes, and possibly the manubrium.    Current therapy:   1. Patient restarted bendamustine and Rituxan on February 20, 2017.  Today is cycle 1 day 15     Past treatment:     1. He had 6 cycles of bendamustine and Rituxan completed in October, 2010. He had 2 years of maintenance Rituxan therapy finishing in September, 2012.     Interim History:  Juvenal returns to the clinic today for midcycle check.  He said overall the chemotherapy went pretty well.  His only complaint really is that he is having some stomach upset and nausea.  No vomiting.  He does not feel that the Zofran is really helpful.  He has been taking it every 4 hours.  He is able to eat.  Things seems to make it better or worse.  He states that his swelling in his legs is the same and has not improved.  He is wearing compression and that has not made much of a difference.  No significant pain.    Pain Status  Currently in Pain: Yes    Review of Systems    Constitutional  Fatigue: Fatigue relieved by rest  Fever: None  Chills: None  Weight Gain: None  Weight Loss: None  Neurosensory  Peripheral Motor Neuropathy: None  Ataxia: None  Peripheral Sensory Neuropathy: Asymptomatic, loss of deep tendon reflexes or paresthesia  Confusion: None  Syncope: None  Eye   Eye Disorder (WDL): All eye disorder elements are within defined limits  Ear   (WDL): all ear disorder elements are within defined limits.   Cardiovascular  Cardiovascular (WDL): All cardiovascular elements are within defined limits  Palpitations: None  Edema: Yes  Edema Limbs: Grade 2  Phlebitis: None  Superficial thrombophlebitis: None  Pulmonary  Cough: None  Dyspnea: Shortness of breath with moderate exertion  Hypoxia: None  Gastrointestinal  Anorexia: Loss of appetite without alteration in eating habits  Constipation: Occasional or intermittent symptoms, occasional use of stool softeners, laxatives, dietary  "modification, or enema  Diarrhea: None  Dysphagia: None  Esophagitis: Asymptomatic, clinical or diagnostic observations only, intervention not indicated  Nausea: Loss of appetite without alteration in eating habits  Pharyngitis: None  Vomiting: None  Dysgeusia: Altered taste but no change in diet  Dry Mouth: Symptomatic (e.g., dry or thick saliva) without significant dietary alteration, unstimulated saliva flow >0.2 ml/min  Genitourinary  Genitourinary (WDL): All genitourinary elements are within defined limits  Musculoskeletal and Connective Tissue  Bone Pain: Mild pain (shoulder aching)  Integumentary  Alopecia: None  Rash Maculo-Papular: None  Pruritus: None  Urticaria: None  Palmar-Plantar Erythrodysesthesia Syndrome: None  Flushing: None  Patient Coping  Patient Coping: Accepting  Distress Assessment  Distress Assessment Score: No distress  Accompanied by  Accompanied by: Alone    Past History  Past Medical History:   Diagnosis Date     Cancer      Non Hodgkin's lymphoma        Physical Exam    Recent Vitals 3/6/2017   Height 5' 8\"   Weight 245 lbs 3 oz   BSA (m2) 2.31 m2   /69   Pulse 68   Temp 98.2   Temp src 1   SpO2 99       General: alert, appears stated age and cooperative  HEENT: Head: Normocephalic, no lesions, without obvious abnormality.  Pharynx: Dental Hygiene adequate. Normal buccal mucosa. Normal pharynx.  Chest: Normal chest wall and respirations. Clear to auscultation.  Cardiac: regular rate and rhythm, S1, S2 normal, no murmur, click, rub or gallop  Abdomen: abdomen is soft without significant tenderness, masses, organomegaly or guarding  Extremities: normal strength, tone, and muscle mass, patient does have swelling bilaterally in his legs.  He is having compression stockings on.  Skin: normal  CNS: normal without focal findings and mental status, speech normal, alert and oriented x3  Lymphatics: No abnormally enlarged lymph nodes.    Lab Results    Recent Results (from the past 168 " hour(s))   Comprehensive Metabolic Panel   Result Value Ref Range    Sodium 139 136 - 145 mmol/L    Potassium 4.4 3.5 - 5.0 mmol/L    Chloride 106 98 - 107 mmol/L    CO2 28 22 - 31 mmol/L    Anion Gap, Calculation 5 5 - 18 mmol/L    Glucose 101 70 - 125 mg/dL    BUN 14 8 - 22 mg/dL    Creatinine 1.07 0.70 - 1.30 mg/dL    GFR MDRD Af Amer >60 >60 mL/min/1.73m2    GFR MDRD Non Af Amer >60 >60 mL/min/1.73m2    Bilirubin, Total 0.3 0.0 - 1.0 mg/dL    Calcium 9.4 8.5 - 10.5 mg/dL    Protein, Total 6.2 6.0 - 8.0 g/dL    Albumin 3.8 3.5 - 5.0 g/dL    Alkaline Phosphatase 71 45 - 120 U/L    AST 18 0 - 40 U/L    ALT 28 0 - 45 U/L   HM1 (CBC with Diff)   Result Value Ref Range    WBC 6.2 4.0 - 11.0 thou/uL    RBC 3.89 (L) 4.40 - 6.20 mill/uL    Hemoglobin 11.0 (L) 14.0 - 18.0 g/dL    Hematocrit 32.5 (L) 40.0 - 54.0 %    MCV 84 80 - 100 fL    MCH 28.2 27.0 - 34.0 pg    MCHC 33.8 32.0 - 36.0 g/dL    RDW 14.8 (H) 11.0 - 14.5 %    Platelets 249 140 - 440 thou/uL    MPV 6.8 (L) 7.0 - 10.0 fL    Neutrophils % 77 (H) 50 - 70 %    Lymphocytes % 8 (L) 20 - 40 %    Monocytes % 12 (H) 2 - 10 %    Eosinophils % 2 0 - 6 %    Basophils % 1 0 - 2 %    Neutrophils Absolute 4.8 2.0 - 7.7 thou/uL    Lymphocytes Absolute 0.5 (L) 0.8 - 4.4 thou/uL    Monocytes Absolute 0.8 0.0 - 0.9 thou/uL    Eosinophils Absolute 0.1 0.0 - 0.4 thou/uL    Basophils Absolute 0.0 0.0 - 0.2 thou/uL       Imaging    Ir Port Placement 5+ Years    Result Date: 2/17/2017  IMPLANTED VENOUS ACCESS PORT PLACEMENT 2/17/2017 12:06 PM INDICATION: Follicular lymphoma, needs long-term central venous access for chemotherapy. PROCEDURES: 1. SUBCUTANEOUS IMPLANTED VENOUS ACCESS PORT. 2. ULTRASOUND GUIDANCE FOR VASCULAR ACCESS. 3. FLUOROSCOPIC GUIDANCE FOR CATHETER PLACEMENT. SEDATION: Versed 2 mg. Fentanyl 100 mcg. The procedure was performed with administration intravenous conscious sedation with appropriate preoperative, intraoperative, and postoperative evaluation. 30 minutes  of supervised face to face conscious sedation time was provided by a radiology nurse under my direct supervision. ANTIBIOTICS: Ancef 2 g IV. FLUOROSCOPIC TIME: 0.1 minutes. NUMBER OF IMAGES: 1. CONTRAST: None. STERILE BARRIER TECHNIQUE: Maximum sterile barrier technique was used. Cutaneous antisepsis was performed at the operative area with application of 2% chlorhexidine and large sterile drape. Prior to the procedure the surgeon and assistant performed hand hygiene and wore hat, mask, sterile gown, and sterile gloves during the entire procedure. PROCEDURE: After discussing the procedure and risks, informed consent was obtained. Permanent ultrasound images were obtained of the right internal jugular vein, documenting patency and compressibility. The right neck and upper chest were prepped and draped. After local anesthesia, the jugular vein was punctured under direct ultrasound guidance, and a small dilator was placed. A short transverse skin incision was made below the clavicle, and a pocket was created for the port. A skin tunnel was created from the pocket to the vein entry site, and the catheter was pulled through the tunnel. The catheter was attached to the port and the port inserted into the pocket. The catheter was cut to an appropriate length. The vein was dilated and the catheter inserted through a peel-away sheath, positioning the tip fluoroscopically near the SVC/RA junction. The port was flushed with heparin. The incision was closed with layered absorbable suture and covered with Dermabond. The patient tolerated the procedure, and there were  no immediate complications.      1.  Uneventful venous access port placement. This port is power injector compatible. CPT codes: 81359, 92571, 87902    Nm Pet Ct Skull To Mid Thigh    Result Date: 2/13/2017  PET FDG/CT 2/13/2017 12:13 PM INDICATION: Lymphoma restaging, subsequent treatment strategy TECHNIQUE: Serum glucose level 104 mg/dL. One hour post left  antecubital intravenous administration of 9.8 mCi F-18 FDG, PET imaging was performed from the skull base to the mid thighs utilizing attenuation correction with concurrent axial CT and PET/CT image fusion. Dose reduction techniques were used. COMPARISON: Chest radiograph from 12/21/2016 is reviewed. FINDINGS: HEAD AND NECK: FDG avid bilateral submandibular, cervical, and supraclavicular lymphadenopathy. A representative right submandibular node measures 1.4 x 0.9 cm (SUVmax 4.8). Moderate generalized cerebral and cerebellar volume loss, likely age-related. CHEST: FDG avid right axillary, subcarinal, lower left paratracheal, lower right paratracheal, retrotracheal, upper left paratracheal, upper right paratracheal, and prevascular lymphadenopathy. A representative right axillary node measures 1.9 x 1.1 cm (SUVmax 4.8). Decreased attenuation of the blood pool relative to myocardium, suggesting anemia. ABDOMEN/PELVIS: Extensive, markedly FDG avid, in many cases confluent, mesenteric, devante hepatic, portacaval, retrocaval, aortocaval, left periaortic, bilateral retroperitoneal, bilateral common iliac, bilateral external iliac, and bilateral inguinal lymph nodes. Confluent mesenteric lymphadenopathy measures about 9 x 5 cm (SUVmax 8.2). Normal liver and spleen. Right kidney cyst. Trace calcified atherosclerosis. MUSCULOSKELETAL: Marked FDG activity in the posterior sonal bilaterally. Bone marrow FDG activity is otherwise normal. Mild hypertrophic change in the spine and sacroiliac joints.     CONCLUSION: 1.  Findings consistent with recurrent active lymphoma involving lymph nodes above and below the diaphragm. 2.  Intense FDG activity in the posterior sonal bilaterally is suspicious for active skeletal lymphoma, unless there has been recent bone marrow biopsy at these sites to incite significant inflammation.    Us Lymph Node Biopsy    Result Date: 2/16/2017  1.  FINE-NEEDLE ASPIRATION RIGHT AXILLA 2.  ULTRASOUND GUIDANCE  2/16/2017 12:45 PM INDICATION: PET positive right axillary node. PROCEDURE: Informed consent obtained. Time out performed. The site was prepped and draped in sterile fashion. 5 mL of 1% lidocaine was infused into the local soft tissues. Under direct ultrasound guidance, multiple fine-needle aspirates of the nodule were obtained. The tissue was felt to be adequate by pathology. RADIOLOGIC SUPERVISION AND INTERPRETATION: ULTRASOUND GUIDANCE: Images demonstrate the needle within the nodule.     CONCLUSION: 1.  Status post ultrasound-guided fine-needle aspiration of a right axillary lymph node        Signed by: Madhavi Kurtz, CNP

## 2021-06-09 NOTE — PROGRESS NOTES
Pt here for cycle 1 day 1 txt. PT has been treated previously for this cancer. Reviewed with pt txt area and amentities. Port accessed sterile technique, labs drawn and approved for txt. txt administered as ordered. Rituxan administered 1:1 ratio. afte 50 minutes of rituxan infusing at 100 ml per hour pt report stomach upset/nausea/sweating/ slight shortness of breath and throat thickening ' like I am getting a cold'. Madhavi OLMOS notified and solucortef given ivp.  At 1040 rituxan restarted at 50 ml per hour. At 1110 increased rituxan to 100 ml per hour. At 1140 pt reports itching, anxiety, gi upset and sweating. rituxan stopped, vital sign stable, Madhavi OLMOS instructed to give pt benadryl 25 mg ivp and given with normal saline line infusing. Also administered pepcid ivp.  Proceeded with rituxan infusion starting at 50 ml and increased rate to max rate of 400 ml per hour. PT tolerated remainder of infusion, vital signs stable, pt is sweating during remainder of infusion but no other symptoms. Bendamustine infused without any side effects, tubing flushed with ns then port flushed with heparin/deaccessed with 2x2 to site. Follow up reviewed and pt to return tomorrow for day 2 treatment.

## 2021-06-09 NOTE — PROGRESS NOTES
Patient arrived ambulatory, by self, for port lab draw.  Port accessed under aseptic technique - excellent blood return noted.  Blood drawn for labs.  Port flushed with NS.  Needle secured.  Patient returned after seeing NP.  Heparin instilled in port.  Needle dced and dressing applied.  Patient left ambulatory, by self, in stable condition.  Instructed to call with questions/concerns/problems.  Patient verbalized understanding and states he has next appt scheduled.

## 2021-06-09 NOTE — LETTER
6/9/2021         RE: Juvenal Blake  [address] [place] [address]        Dear Colleague,    Thank you for referring your patient, Juvenal Blake, to the [facility] BLOOD AND MARROW TRANSPLANT PROGRAM [place]. Please see a copy of my visit note below.    Pharmacy Assessment - Pre-Stem Cell Transplant    Assessments & Recommendations:  1) Granisetron in place of ondansetron (itching) given the zofran allergy. Tolerates Kytril .   2) Holding Idelalisid prior to starting the phase study?   3) Received Rituxan with the Lymphodepletion day -4.     History of Present Illness:  Juvenal Blake is a 55 year old year old male diagnosed with NHL.  He has been treated with Bendamustine + Rituxan then Bendamustine +  Rituxan once again the R-CHOP followed by Nam-NK cell infusion. He is now being work up for [place] 516 infusions(debulking)  on protocol AB0126-00, which utilizes Cy/Flu as a lymphodepletion regimen as bridge to Yes[place]a.    Pertinent labs/tests:  Viral Testing:  CMV(+) / HSV(+/+) / EBV(+) / VZV (?)  Ejection Fraction: 60% (6/7)  QTc: 440msec (6/7)    Weights:   Wt Readings from Last 3 Encounters:   06/04/21 112.1 kg (247 lb 1.6 oz)   05/27/21 114.1 kg (251 lb 9.6 oz)   04/22/21 115.4 kg (254 lb 6 oz)   Ideal body weight: 69.6 kg (153 lb 5.6 oz)  Adjusted ideal body weight: 86.6 kg (190 lb 13.6 oz)  % IBW:  160  There is no height or weight on file to calculate BMI.    Primary BMT Physician:   BMT RN Coordinator:  Arlen Lui    Past Medical History:  Past Medical History:   Diagnosis Date     Cancer (H)      Non Hodgkin's lymphoma (H)      Shortness of breath        Medication Allergies:  Allergies   Allergen Reactions     Ondansetron Itching       Current Medications (pre-admit):  Current Outpatient Medications   Medication Sig Dispense Refill     allopurinol (ZYLOPRIM) 300 MG tablet Take 1 tablet (300 mg) by mouth daily 30 tablet 3     azelastine (ASTELIN) 0.1 % nasal spray  USE 1 SPRAY IN EACH NOSTRIL TWICE DAILY AS DIRECTED       fluticasone (FLONASE) 50 MCG/ACT nasal spray SHAKE LIQUID AND USE 2 SPRAYS IN EACH NOSTRIL EVERY DAY       hydrOXYzine (ATARAX) 50 MG tablet Take 1 tablet (50 mg) by mouth 3 times daily as needed for itching (Patient not taking: Reported on 5/27/2021) 30 tablet 0     medical cannabis (Patient's own supply)        omeprazole (PRILOSEC) 20 MG DR capsule Take 1 capsule (20 mg) by mouth daily       Psyllium (METAMUCIL FIBER) 51.7 % PACK Take 1 packet by mouth daily        tolterodine ER (DETROL LA) 4 MG 24 hr capsule Take 4 mg by mouth       ZYDELIG 150 MG tablet Take 1 tablet (150 mg) by mouth 2 times daily         Herbal Medication/Nutritional Supplements:  Daily multivitamin    Smoking/Past Drug Use:  Currently smokes marijuana and medical cannabis not licensed in [place]     Nausea/Vomiting, Pain, or other issues:  No issues     Summary:  I met with Juvenal Blake for approximately 30 minutes.  We discussed his current and phase study medications including the [place] 516 cells and IL-2, Lymphodepletion chemotherapy, antiemetics and miscellaneous premedications .      Again, thank you for allowing me to participate in the care of your patient.        Sincerely,        BMT Pharm D, RPH

## 2021-06-09 NOTE — PROGRESS NOTES
[facility] Hematology and Oncology Progress Note    Patient: Juvenal Blake  MRN: [id number]  Date of Service:         Reason for Visit    Chief Complaint   Patient presents with     HE Cancer       Assessment and Plan    1.  Follicular lymphoma, low-grade.  Patient now has recurrent disease that is causing issues.  He has restarted on treatment with bendamustine and Rituxan.  He has had his first cycle 4 weeks ago.  He did have issues with Rituxan reaction.  He was able to get the entire dose.  He has had no allergic reaction symptoms after that.  I did instruct him that we will likely do 3 cycles and then either a CT scan or a PET scan.  His LD really was not elevated so I do not think that that will be helpful.  He will return in 4 weeks for cycle 3.    2.  Dyspepsia and nausea: This is from the chemotherapy.  Continue Compazine as needed.  I also did add Emend to his treatment regimen.    3. 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  The chemo is usually cumulative. Overall asymptomatic. Continue to monitor.     4. Fatigue: likely chemo induced and is cumulative. Encourage good hydration, healthy diet with good protein. Also encourage daily exercise and to be as active as possible.  He is likely slightly dyspnea and exertion because of deconditioning as well.      ECOG Performance   ECOG Performance Status: 2    Distress Assessment  Distress Assessment Score: No distress    Pain  Currently in Pain: Yes  Pain Score (Initial OR Reassessment): 5  Location: Bilat Shoulder this is not new pain.  Continue to manage with over-the-counter meds.      Problem List    1. Follicular lymphoma grade i, lymph nodes of multiple sites  Oncology Staff Appointment    Infusion Appointment    Infusion Appointment    DISCONTINUED: bendamustine 210 mg in sodium chloride 0.9% 50 mL chemo (BENDEKA)    DISCONTINUED: fosaprepitant injection 150 mg (EMEND)    DISCONTINUED: famotidine 20 mg/2 mL injection 20 mg (PEPCID)    DISCONTINUED:  sodium chloride 0.9% 250 mL infusion    DISCONTINUED: ondansetron 16 mg, dexamethasone 20 mg in sodium chloride 0.9% 25 mL IVPB    DISCONTINUED: diphenhydrAMINE injection 50 mg (BENADRYL)    DISCONTINUED: acetaminophen tablet 650 mg (TYLENOL)    DISCONTINUED: riTUXimab 850 mg in sodium chloride 0.9% 165 mL chemo (RITUXAN)    DISCONTINUED: sodium chloride 0.9 % flush 20 mL (NS)    DISCONTINUED: heparin 100 unit/mL lockflush (PF) porcine 300-600 Units    DISCONTINUED: diphenhydrAMINE injection 50 mg (BENADRYL)    DISCONTINUED: famotidine 20 mg/2 mL injection 20 mg (PEPCID)    DISCONTINUED: hydrocortisone sod succ (PF) 100 mg/2 mL injection 100 mg    DISCONTINUED: acetaminophen tablet 1,000 mg (TYLENOL)   2. Fatigue     3. Anemia associated with chemotherapy        ______________________________________________________________________________    History of Present Illness    Diagnosis:     1. Follicular lymphoma: Diagnosed in April, 2010 . Stage IV diagnosis with bone marrow involvement. Initial disease involved the cervical, supraclavicular, axillary, mesenteric mass, retroperitoneal nodes, and possibly the manubrium.    Current therapy:   1. Patient restarted bendamustine and Rituxan on February 20, 2017.  Today is cycle 2 day 1     Past treatment:     1. He had 6 cycles of bendamustine and Rituxan completed in October, 2010. He had 2 years of maintenance Rituxan therapy finishing in September, 2012.     Interim History:  Juvenal returns to the clinic today for midcycle check.  He said overall the chemotherapy went pretty well.  He states that the last week and 1/2-2 weeks he felt pretty good.  We did give him Compazine for nausea at his midcycle check and that improved his nausea.    Pain Status  Currently in Pain: Yes    Review of Systems    Constitutional  Constitutional (WDL): Exceptions to WDL  Fatigue: Fatigue not relieved by rest - Limiting instrumental ADL (Mod/Severe fatigue)  Weight Gain: 5 - <10% from  "baseline (up 2lbs since 3/6)  Neurosensory  Neurosensory (WDL): Exceptions to WDL  Peripheral Motor Neuropathy: Asymptomatic, clinical or diagnostic observations only, intervention not indicated  Ataxia: Asymptomatic, clinical or diagnostic observations only, intervention not indicated (unsteady in the morning when waking up)  Eye   Eye Disorder (WDL): Exceptions to WDL (Has \"floaters\" in eyes)  Dry Eye: Asymptomatic, clinical or diagnostic observations only, mild symptoms relieved by lubricants (Dry/Itching eyes)  Ear  Ear Disorder (WDL): All ear disorder elements are within defined limits  Cardiovascular  Cardiovascular (WDL): Exceptions to WDL  Palpitations: Definition: A disorder characterized by inflammation of the muscle tissue of the heart. (recently saw PCP for this)  Edema: Yes  Edema Limbs: Grade 2 (Legs)  Pulmonary  Respiratory (WDL): Exceptions to WDL  Dyspnea: Shortness of breath with minimal exertion, limiting instrumental ADL  Gastrointestinal  Gastrointestinal (WDL): Exceptions to WDL  Nausea: Loss of appetite without alteration in eating habits (compazine helpful)  Dysgeusia: Altered taste but no change in diet (everyhting is bland)  Genitourinary  Genitourinary (WDL): All genitourinary elements are within defined limits  Musculoskeletal and Connective Tissue  Musculoskeletal and Connetive Tissue Disorders (WDL): Exceptions to WDL  Bone Pain: Mild pain (shoulder)  Muscle Weakness : Symptomatic, perceived by patient but not evident on physical exam  Myalgia: Mild pain (shoulder)  Integumentary  Integumentary (WDL): Exceptions to WDL  Pruritus: Mild or localized, topical intervention indicated  Patient Coping  Patient Coping: Accepting  Distress Assessment  Distress Assessment Score: No distress  Accompanied by  Accompanied by: Family Member    Past History  Past Medical History:   Diagnosis Date     Cancer      Non Hodgkin's lymphoma        Physical Exam    Recent Vitals 3/20/2017   Height -   Weight " 247 lbs   BSA (m2) -   /80   Pulse 74   Temp 97.9   Temp src 1   SpO2 97       General: alert, appears stated age and cooperative, here with wife  HEENT: Head: Normocephalic, no lesions, without obvious abnormality.  Pharynx: Dental Hygiene adequate. Normal buccal mucosa. Normal pharynx.  Chest: Normal chest wall and respirations. Clear to auscultation.  Cardiac: regular rate and rhythm, S1, S2 normal, no murmur, click, rub or gallop  Abdomen: abdomen is soft without significant tenderness, masses, organomegaly or guarding  Extremities: normal strength, tone, and muscle mass, patient does have swelling bilaterally in his legs.  He does havecompression stockings on.  Skin: normal  CNS: normal without focal findings and mental status, speech normal, alert and oriented x3  Lymphatics: No abnormally enlarged lymph nodes.    Lab Results    Recent Results (from the past 168 hour(s))   Comprehensive Metabolic Panel   Result Value Ref Range    Sodium 139 136 - 145 mmol/L    Potassium 4.0 3.5 - 5.0 mmol/L    Chloride 108 (H) 98 - 107 mmol/L    CO2 24 22 - 31 mmol/L    Anion Gap, Calculation 7 5 - 18 mmol/L    Glucose 157 (H) 70 - 125 mg/dL    BUN 18 8 - 22 mg/dL    Creatinine 1.13 0.70 - 1.30 mg/dL    GFR MDRD Af Amer >60 >60 mL/min/1.73m2    GFR MDRD Non Af Amer >60 >60 mL/min/1.73m2    Bilirubin, Total 0.3 0.0 - 1.0 mg/dL    Calcium 9.2 8.5 - 10.5 mg/dL    Protein, Total 6.2 6.0 - 8.0 g/dL    Albumin 3.8 3.5 - 5.0 g/dL    Alkaline Phosphatase 66 45 - 120 U/L    AST 22 0 - 40 U/L    ALT 31 0 - 45 U/L   HM1 (CBC with Diff)   Result Value Ref Range    WBC 4.9 4.0 - 11.0 thou/uL    RBC 4.28 (L) 4.40 - 6.20 mill/uL    Hemoglobin 11.6 (L) 14.0 - 18.0 g/dL    Hematocrit 35.5 (L) 40.0 - 54.0 %    MCV 83 80 - 100 fL    MCH 27.1 27.0 - 34.0 pg    MCHC 32.7 32.0 - 36.0 g/dL    RDW 14.6 (H) 11.0 - 14.5 %    Platelets 190 140 - 440 thou/uL    MPV 7.8 7.0 - 10.0 fL    Neutrophils % 69 50 - 70 %    Lymphocytes % 15 (L) 20 - 40 %     Monocytes % 11 (H) 2 - 10 %    Eosinophils % 5 0 - 6 %    Basophils % 1 0 - 2 %    Neutrophils Absolute 3.4 2.0 - 7.7 thou/uL    Lymphocytes Absolute 0.7 (L) 0.8 - 4.4 thou/uL    Monocytes Absolute 0.6 0.0 - 0.9 thou/uL    Eosinophils Absolute 0.2 0.0 - 0.4 thou/uL    Basophils Absolute 0.0 0.0 - 0.2 thou/uL       Imaging    No results found.      Signed by: Madhavi Kurtz, CNP

## 2021-06-09 NOTE — PROGRESS NOTES
BMT Teaching Flowsheet    Juvenal Blake is a 55 year old male  Diagnoses of Follicular lymphoma grade i, lymph nodes of multiple sites (H) and Personal history of diseases of blood and blood-forming organs were pertinent to this visit.    Teaching Topic: fate 516 infusion with LD chemo    Person(s) involved in teaching: Patient  Motivation Level  Asks Questions: Yes  Eager to Learn: Yes  Cooperative: Yes  Receptive (willing/able to accept information): Yes  Any cultural factors/Adventism beliefs that may influence understanding or compliance? No    Patient demonstrates understanding of the following:  - Reason for the appointment, diagnosis and treatment plan: Yes  - Knowledge of proper use of medications and conditions for which they are ordered (with special attention to potential side effects or drug interactions): Yes  - Which situations necessitate calling provider and whom to contact: Yes    Teaching concerns addressed: LD chemo and FATE 516 infusion plan, when to call and PICC line care. Pt requesting PICC line teaching refresher and verbalized understanding of plan.     Proper use and care of (medical equipment, care aids, etc.) No, explain: needs picc line teaching, order placed for PLC appt  Pain management techniques: NA  Patient instructed on hand hygiene: Yes  How and/when to access community resources: Yes    Infection Control:  Patient demonstrates understanding of the following:  Surgical procedure site care taught NA  Signs and symptoms of infection taught Yes  Wound care taught NA  Central venous catheter care taught NA    Instructional Materials Used/Given:   Calendar, med cards, when to call and what to expect in clinic handouts.  For  [facility] patient wallet card given. Patient instructed to remain within close proximity (at least two hours) for at least four weeks post infusion.    Research participant Juvenal Blake was provided the information on the Research Participant  Information Sheet by Hilda Lui RN on June 9, 2021. This document contains information regarding the risks of participating in a research study during the COVID-19 pandemic. Receipt of the information sheet was confirmed by study personnel prior to the visit.  Time spent with patient: 30 minutes.    Specific Concerns: NA

## 2021-06-09 NOTE — PROGRESS NOTES
Patient arrived ambulatory, accompanied by wife, for port lab draw.  Port accessed under aseptic technique - excellent blood return noted.  Blood drawn for labs.  Port flushed with NS.  Needle secured.

## 2021-06-09 NOTE — PROGRESS NOTES
PT here for day 2 cycle 2 txt. PT states had some nausea last evening at dinnertime and took antinausea perscription which helped. Otherwise pt has no other symptoms from txt yesterday. Reviewed txt today/port accessed sterile technique, with brisk blood return and txt administered as ordered/ pt tolerated txt without any side effects. Tubing flushed with ns upon completion of txt. Port flushed with heparin/deaccesesd with 2x2 to site. Follow up reviewed and pt dc'd steady gait with wife.

## 2021-06-10 ENCOUNTER — RESEARCH ENCOUNTER (OUTPATIENT)
Dept: TRANSPLANT | Facility: CLINIC | Age: 56
End: 2021-06-10

## 2021-06-10 ENCOUNTER — HOSPITAL ENCOUNTER (OUTPATIENT)
Facility: CLINIC | Age: 56
End: 2021-06-10
Attending: INTERNAL MEDICINE | Admitting: INTERNAL MEDICINE
Payer: COMMERCIAL

## 2021-06-10 ENCOUNTER — TELEPHONE (OUTPATIENT)
Dept: TRANSPLANT | Facility: CLINIC | Age: 56
End: 2021-06-10

## 2021-06-10 ENCOUNTER — HOSPITAL ENCOUNTER (OUTPATIENT)
Age: 56
End: 2021-06-10
Payer: COMMERCIAL

## 2021-06-10 ENCOUNTER — OFFICE VISIT (OUTPATIENT)
Dept: TRANSPLANT | Facility: CLINIC | Age: 56
End: 2021-06-10
Payer: COMMERCIAL

## 2021-06-10 ENCOUNTER — HOSPITAL ENCOUNTER (INPATIENT)
Dept: GENERAL RADIOLOGY | Facility: CLINIC | Age: 56
End: 2021-06-10
Payer: COMMERCIAL

## 2021-06-10 ENCOUNTER — APPOINTMENT (OUTPATIENT)
Dept: EDUCATION SERVICES | Facility: CLINIC | Age: 56
End: 2021-06-10
Payer: COMMERCIAL

## 2021-06-10 VITALS
WEIGHT: 249 LBS | SYSTOLIC BLOOD PRESSURE: 137 MMHG | BODY MASS INDEX: 37.31 KG/M2 | RESPIRATION RATE: 18 BRPM | DIASTOLIC BLOOD PRESSURE: 80 MMHG | HEART RATE: 72 BPM | TEMPERATURE: 98.3 F | OXYGEN SATURATION: 98 %

## 2021-06-10 DIAGNOSIS — C82.08 FOLLICULAR LYMPHOMA GRADE I, LYMPH NODES OF MULTIPLE SITES (H): ICD-10-CM

## 2021-06-10 DIAGNOSIS — C82.08 FOLLICULAR LYMPHOMA GRADE I, LYMPH NODES OF MULTIPLE SITES (H): Primary | ICD-10-CM

## 2021-06-10 DIAGNOSIS — Z86.2 PERSONAL HISTORY OF DISEASES OF BLOOD AND BLOOD-FORMING ORGANS: ICD-10-CM

## 2021-06-10 LAB
COPATH REPORT: NORMAL
LABORATORY COMMENT REPORT: NORMAL
SARS-COV-2 RNA RESP QL NAA+PROBE: NEGATIVE
SARS-COV-2 RNA RESP QL NAA+PROBE: NORMAL
SPECIMEN SOURCE: NORMAL
SPECIMEN SOURCE: NORMAL

## 2021-06-10 PROCEDURE — 999N000122 PET MHEALTH OVERREAD

## 2021-06-10 PROCEDURE — U0005 INFEC AGEN DETEC AMPLI PROBE: HCPCS | Mod: 90 | Performed by: PATHOLOGY

## 2021-06-10 PROCEDURE — G0463 HOSPITAL OUTPT CLINIC VISIT: HCPCS

## 2021-06-10 PROCEDURE — 78815 PET IMAGE W/CT SKULL-THIGH: CPT | Mod: 26

## 2021-06-10 PROCEDURE — U0003 INFECTIOUS AGENT DETECTION BY NUCLEIC ACID (DNA OR RNA); SEVERE ACUTE RESPIRATORY SYNDROME CORONAVIRUS 2 (SARS-COV-2) (CORONAVIRUS DISEASE [COVID-19]), AMPLIFIED PROBE TECHNIQUE, MAKING USE OF HIGH THROUGHPUT TECHNOLOGIES AS DESCRIBED BY CMS-2020-01-R: HCPCS | Mod: 90 | Performed by: PATHOLOGY

## 2021-06-10 PROCEDURE — 99215 OFFICE O/P EST HI 40 MIN: CPT

## 2021-06-10 RX ORDER — MEPERIDINE HYDROCHLORIDE 25 MG/ML
25 INJECTION INTRAMUSCULAR; INTRAVENOUS; SUBCUTANEOUS EVERY 30 MIN PRN
Status: CANCELLED | OUTPATIENT
Start: 2021-07-05

## 2021-06-10 RX ORDER — MEPERIDINE HYDROCHLORIDE 25 MG/ML
25-50 INJECTION INTRAMUSCULAR; INTRAVENOUS; SUBCUTANEOUS
Status: CANCELLED | OUTPATIENT
Start: 2021-07-05 | End: 2021-06-24

## 2021-06-10 RX ORDER — MEPERIDINE HYDROCHLORIDE 25 MG/ML
25 INJECTION INTRAMUSCULAR; INTRAVENOUS; SUBCUTANEOUS EVERY 30 MIN PRN
Status: CANCELLED | OUTPATIENT
Start: 2021-06-25

## 2021-06-10 RX ORDER — ALBUTEROL SULFATE 5 MG/ML
2.5 SOLUTION RESPIRATORY (INHALATION)
Status: CANCELLED | OUTPATIENT
Start: 2021-07-12

## 2021-06-10 RX ORDER — MEPERIDINE HYDROCHLORIDE 25 MG/ML
25-50 INJECTION INTRAMUSCULAR; INTRAVENOUS; SUBCUTANEOUS
Status: CANCELLED | OUTPATIENT
Start: 2021-06-28 | End: 2021-06-17

## 2021-06-10 RX ORDER — METHYLPREDNISOLONE SODIUM SUCCINATE 125 MG/2ML
125 INJECTION, POWDER, LYOPHILIZED, FOR SOLUTION INTRAMUSCULAR; INTRAVENOUS
Status: CANCELLED
Start: 2021-06-25

## 2021-06-10 RX ORDER — EPINEPHRINE 1 MG/ML
0.3 INJECTION, SOLUTION INTRAMUSCULAR; SUBCUTANEOUS EVERY 5 MIN PRN
Status: CANCELLED | OUTPATIENT
Start: 2021-06-25

## 2021-06-10 RX ORDER — NALOXONE HYDROCHLORIDE 0.4 MG/ML
0.2 INJECTION, SOLUTION INTRAMUSCULAR; INTRAVENOUS; SUBCUTANEOUS
Status: CANCELLED | OUTPATIENT
Start: 2021-07-12

## 2021-06-10 RX ORDER — ACETAMINOPHEN 325 MG/1
650 TABLET ORAL ONCE
Status: CANCELLED
Start: 2021-06-24

## 2021-06-10 RX ORDER — DIPHENHYDRAMINE HYDROCHLORIDE 50 MG/ML
50 INJECTION INTRAMUSCULAR; INTRAVENOUS
Status: CANCELLED
Start: 2021-06-28

## 2021-06-10 RX ORDER — METHYLPREDNISOLONE SODIUM SUCCINATE 125 MG/2ML
125 INJECTION, POWDER, LYOPHILIZED, FOR SOLUTION INTRAMUSCULAR; INTRAVENOUS
Status: CANCELLED
Start: 2021-07-12

## 2021-06-10 RX ORDER — NALOXONE HYDROCHLORIDE 0.4 MG/ML
0.2 INJECTION, SOLUTION INTRAMUSCULAR; INTRAVENOUS; SUBCUTANEOUS
Status: CANCELLED | OUTPATIENT
Start: 2021-07-05

## 2021-06-10 RX ORDER — ALBUTEROL SULFATE 90 UG/1
1-2 AEROSOL, METERED RESPIRATORY (INHALATION)
Status: CANCELLED
Start: 2021-06-25

## 2021-06-10 RX ORDER — ALBUTEROL SULFATE 5 MG/ML
2.5 SOLUTION RESPIRATORY (INHALATION)
Status: CANCELLED | OUTPATIENT
Start: 2021-07-05

## 2021-06-10 RX ORDER — EPINEPHRINE 1 MG/ML
0.3 INJECTION, SOLUTION INTRAMUSCULAR; SUBCUTANEOUS EVERY 5 MIN PRN
Status: CANCELLED | OUTPATIENT
Start: 2021-06-28

## 2021-06-10 RX ORDER — METHYLPREDNISOLONE SODIUM SUCCINATE 125 MG/2ML
125 INJECTION, POWDER, LYOPHILIZED, FOR SOLUTION INTRAMUSCULAR; INTRAVENOUS
Status: CANCELLED
Start: 2021-06-23

## 2021-06-10 RX ORDER — MEPERIDINE HYDROCHLORIDE 25 MG/ML
25 INJECTION INTRAMUSCULAR; INTRAVENOUS; SUBCUTANEOUS EVERY 30 MIN PRN
Status: CANCELLED | OUTPATIENT
Start: 2021-06-23

## 2021-06-10 RX ORDER — EPINEPHRINE 1 MG/ML
0.3 INJECTION, SOLUTION INTRAMUSCULAR; SUBCUTANEOUS EVERY 5 MIN PRN
Status: CANCELLED | OUTPATIENT
Start: 2021-06-24

## 2021-06-10 RX ORDER — NALOXONE HYDROCHLORIDE 0.4 MG/ML
0.2 INJECTION, SOLUTION INTRAMUSCULAR; INTRAVENOUS; SUBCUTANEOUS
Status: CANCELLED | OUTPATIENT
Start: 2021-06-24

## 2021-06-10 RX ORDER — DIPHENHYDRAMINE HYDROCHLORIDE 50 MG/ML
50 INJECTION INTRAMUSCULAR; INTRAVENOUS
Status: CANCELLED
Start: 2021-06-25

## 2021-06-10 RX ORDER — ALBUTEROL SULFATE 5 MG/ML
2.5 SOLUTION RESPIRATORY (INHALATION)
Status: CANCELLED | OUTPATIENT
Start: 2021-06-25

## 2021-06-10 RX ORDER — ACETAMINOPHEN 325 MG/1
650 TABLET ORAL ONCE
Status: CANCELLED
Start: 2021-07-12

## 2021-06-10 RX ORDER — DIPHENHYDRAMINE HCL 25 MG
50 CAPSULE ORAL ONCE
Status: CANCELLED
Start: 2021-06-24

## 2021-06-10 RX ORDER — METHYLPREDNISOLONE SODIUM SUCCINATE 125 MG/2ML
125 INJECTION, POWDER, LYOPHILIZED, FOR SOLUTION INTRAMUSCULAR; INTRAVENOUS
Status: CANCELLED
Start: 2021-06-24

## 2021-06-10 RX ORDER — MEPERIDINE HYDROCHLORIDE 25 MG/ML
25 INJECTION INTRAMUSCULAR; INTRAVENOUS; SUBCUTANEOUS EVERY 30 MIN PRN
Status: CANCELLED | OUTPATIENT
Start: 2021-06-28

## 2021-06-10 RX ORDER — ACETAMINOPHEN 325 MG/1
650 TABLET ORAL EVERY 4 HOURS
Status: CANCELLED
Start: 2021-06-28

## 2021-06-10 RX ORDER — ACETAMINOPHEN 325 MG/1
650 TABLET ORAL EVERY 4 HOURS
Status: CANCELLED
Start: 2021-07-05

## 2021-06-10 RX ORDER — DIPHENHYDRAMINE HCL 25 MG
25 CAPSULE ORAL ONCE
Status: CANCELLED
Start: 2021-07-12

## 2021-06-10 RX ORDER — DIPHENHYDRAMINE HCL 25 MG
25 CAPSULE ORAL EVERY 4 HOURS
Status: CANCELLED
Start: 2021-07-05

## 2021-06-10 RX ORDER — DIPHENHYDRAMINE HYDROCHLORIDE 50 MG/ML
50 INJECTION INTRAMUSCULAR; INTRAVENOUS
Status: CANCELLED
Start: 2021-07-12

## 2021-06-10 RX ORDER — ACETAMINOPHEN 325 MG/1
650 TABLET ORAL ONCE
Status: CANCELLED
Start: 2021-07-05

## 2021-06-10 RX ORDER — EPINEPHRINE 1 MG/ML
0.3 INJECTION, SOLUTION INTRAMUSCULAR; SUBCUTANEOUS EVERY 5 MIN PRN
Status: CANCELLED | OUTPATIENT
Start: 2021-06-23

## 2021-06-10 RX ORDER — DIPHENHYDRAMINE HCL 25 MG
25 CAPSULE ORAL ONCE
Status: CANCELLED
Start: 2021-06-28

## 2021-06-10 RX ORDER — LORAZEPAM 2 MG/ML
0.5 INJECTION INTRAMUSCULAR EVERY 4 HOURS PRN
Status: CANCELLED
Start: 2021-06-25

## 2021-06-10 RX ORDER — EPINEPHRINE 1 MG/ML
0.3 INJECTION, SOLUTION INTRAMUSCULAR; SUBCUTANEOUS EVERY 5 MIN PRN
Status: CANCELLED | OUTPATIENT
Start: 2021-07-05

## 2021-06-10 RX ORDER — ALBUTEROL SULFATE 90 UG/1
1-2 AEROSOL, METERED RESPIRATORY (INHALATION)
Status: CANCELLED
Start: 2021-06-28

## 2021-06-10 RX ORDER — MEPERIDINE HYDROCHLORIDE 25 MG/ML
25 INJECTION INTRAMUSCULAR; INTRAVENOUS; SUBCUTANEOUS EVERY 30 MIN PRN
Status: CANCELLED | OUTPATIENT
Start: 2021-07-12

## 2021-06-10 RX ORDER — DIPHENHYDRAMINE HYDROCHLORIDE 50 MG/ML
50 INJECTION INTRAMUSCULAR; INTRAVENOUS
Status: CANCELLED
Start: 2021-06-24

## 2021-06-10 RX ORDER — ACETAMINOPHEN 325 MG/1
650 TABLET ORAL ONCE
Status: CANCELLED
Start: 2021-06-28

## 2021-06-10 RX ORDER — LORAZEPAM 2 MG/ML
0.5 INJECTION INTRAMUSCULAR EVERY 4 HOURS PRN
Status: CANCELLED
Start: 2021-06-23

## 2021-06-10 RX ORDER — DIPHENHYDRAMINE HCL 25 MG
25 CAPSULE ORAL ONCE
Status: CANCELLED
Start: 2021-07-05

## 2021-06-10 RX ORDER — LORAZEPAM 2 MG/ML
0.5 INJECTION INTRAMUSCULAR EVERY 4 HOURS PRN
Status: CANCELLED
Start: 2021-06-24

## 2021-06-10 RX ORDER — ALBUTEROL SULFATE 90 UG/1
1-2 AEROSOL, METERED RESPIRATORY (INHALATION)
Status: CANCELLED
Start: 2021-06-23

## 2021-06-10 RX ORDER — ALBUTEROL SULFATE 5 MG/ML
2.5 SOLUTION RESPIRATORY (INHALATION)
Status: CANCELLED | OUTPATIENT
Start: 2021-06-24

## 2021-06-10 RX ORDER — ALBUTEROL SULFATE 90 UG/1
1-2 AEROSOL, METERED RESPIRATORY (INHALATION)
Status: CANCELLED
Start: 2021-07-12

## 2021-06-10 RX ORDER — DIPHENHYDRAMINE HYDROCHLORIDE 50 MG/ML
50 INJECTION INTRAMUSCULAR; INTRAVENOUS
Status: CANCELLED
Start: 2021-06-23

## 2021-06-10 RX ORDER — NALOXONE HYDROCHLORIDE 0.4 MG/ML
0.2 INJECTION, SOLUTION INTRAMUSCULAR; INTRAVENOUS; SUBCUTANEOUS
Status: CANCELLED | OUTPATIENT
Start: 2021-06-28

## 2021-06-10 RX ORDER — METHYLPREDNISOLONE SODIUM SUCCINATE 125 MG/2ML
125 INJECTION, POWDER, LYOPHILIZED, FOR SOLUTION INTRAMUSCULAR; INTRAVENOUS
Status: CANCELLED
Start: 2021-07-05

## 2021-06-10 RX ORDER — DIPHENHYDRAMINE HCL 25 MG
25 CAPSULE ORAL EVERY 4 HOURS
Status: CANCELLED
Start: 2021-06-28

## 2021-06-10 RX ORDER — EPINEPHRINE 1 MG/ML
0.3 INJECTION, SOLUTION INTRAMUSCULAR; SUBCUTANEOUS EVERY 5 MIN PRN
Status: CANCELLED | OUTPATIENT
Start: 2021-07-12

## 2021-06-10 RX ORDER — ALBUTEROL SULFATE 5 MG/ML
2.5 SOLUTION RESPIRATORY (INHALATION)
Status: CANCELLED | OUTPATIENT
Start: 2021-06-23

## 2021-06-10 RX ORDER — NALOXONE HYDROCHLORIDE 0.4 MG/ML
0.2 INJECTION, SOLUTION INTRAMUSCULAR; INTRAVENOUS; SUBCUTANEOUS
Status: CANCELLED | OUTPATIENT
Start: 2021-06-23

## 2021-06-10 RX ORDER — ALBUTEROL SULFATE 90 UG/1
1-2 AEROSOL, METERED RESPIRATORY (INHALATION)
Status: CANCELLED
Start: 2021-06-24

## 2021-06-10 RX ORDER — DIPHENHYDRAMINE HCL 25 MG
25 CAPSULE ORAL EVERY 4 HOURS
Status: CANCELLED
Start: 2021-07-12

## 2021-06-10 RX ORDER — NALOXONE HYDROCHLORIDE 0.4 MG/ML
0.2 INJECTION, SOLUTION INTRAMUSCULAR; INTRAVENOUS; SUBCUTANEOUS
Status: CANCELLED | OUTPATIENT
Start: 2021-06-25

## 2021-06-10 RX ORDER — ALBUTEROL SULFATE 5 MG/ML
2.5 SOLUTION RESPIRATORY (INHALATION)
Status: CANCELLED | OUTPATIENT
Start: 2021-06-28

## 2021-06-10 RX ORDER — ALBUTEROL SULFATE 90 UG/1
1-2 AEROSOL, METERED RESPIRATORY (INHALATION)
Status: CANCELLED
Start: 2021-07-05

## 2021-06-10 RX ORDER — MEPERIDINE HYDROCHLORIDE 25 MG/ML
25 INJECTION INTRAMUSCULAR; INTRAVENOUS; SUBCUTANEOUS EVERY 30 MIN PRN
Status: CANCELLED | OUTPATIENT
Start: 2021-06-24

## 2021-06-10 RX ORDER — DIPHENHYDRAMINE HYDROCHLORIDE 50 MG/ML
50 INJECTION INTRAMUSCULAR; INTRAVENOUS
Status: CANCELLED
Start: 2021-07-05

## 2021-06-10 RX ORDER — METHYLPREDNISOLONE SODIUM SUCCINATE 125 MG/2ML
125 INJECTION, POWDER, LYOPHILIZED, FOR SOLUTION INTRAMUSCULAR; INTRAVENOUS
Status: CANCELLED
Start: 2021-06-28

## 2021-06-10 RX ORDER — ACETAMINOPHEN 325 MG/1
650 TABLET ORAL EVERY 4 HOURS
Status: CANCELLED
Start: 2021-07-12

## 2021-06-10 RX ORDER — MEPERIDINE HYDROCHLORIDE 25 MG/ML
25-50 INJECTION INTRAMUSCULAR; INTRAVENOUS; SUBCUTANEOUS
Status: CANCELLED | OUTPATIENT
Start: 2021-07-12 | End: 2021-07-01

## 2021-06-10 ASSESSMENT — PAIN SCALES - GENERAL: PAINLEVEL: MILD PAIN (2)

## 2021-06-10 ASSESSMENT — ANXIETY QUESTIONNAIRES: GAD7 TOTAL SCORE: 3

## 2021-06-10 NOTE — PROGRESS NOTES
[facility] Hematology and Oncology Progress Note    Patient: Juvenal Blake  MRN: [id number]  Date of Service:         Reason for Visit    Chief Complaint   Patient presents with     HE Cancer       Assessment and Plan    1.  Follicular lymphoma, low-grade.  Patient now has recurrent disease that is causing issues.  He has restarted on treatment with bendamustine and Rituxan.  He is tolerating this chemotherapy well.  His CT scan show significant response we will continue and complete 6 cycles.  He will return in 4 weeks for cycle 5.  We will give cycle 4 today at full dose.  Likely due a PET scan after the sixth cycle.    2.  Mild bilateral leg swelling: She did wear compression stockings.  Continue to elevate legs when he sitting around.  Encouraged him to be active.    3. 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  The chemo is usually cumulative. Overall asymptomatic. Continue to monitor.  Slowly getting worse    4. Fatigue: likely chemo induced and is cumulative. Encourage good hydration, healthy diet with good protein. Also encourage daily exercise and to be as active as possible.  He is likely slightly dyspnea and exertion because of deconditioning as well.      ECOG Performance   ECOG Performance Status: 1    Distress Assessment  Distress Assessment Score: No distress    Pain  Currently in Pain: Yes  Pain Score (Initial OR Reassessment): 3  Location: shoulders       Problem List    1. Follicular lymphoma grade i, lymph nodes of multiple sites  CC OFFICE VISIT LONG    Infusion Appointment    Infusion Appointment    CC OFFICE VISIT LONG    Infusion Appointment    DISCONTINUED: fosaprepitant 150 mg in sodium chloride 0.9% 150 mL (EMEND)    DISCONTINUED: famotidine 20 mg/2 mL injection 20 mg (PEPCID)    DISCONTINUED: sodium chloride 0.9% 250 mL infusion    DISCONTINUED: ondansetron 16 mg, dexamethasone 20 mg in sodium chloride 0.9% 25 mL IVPB    DISCONTINUED: diphenhydrAMINE injection 50 mg (BENADRYL)     DISCONTINUED: acetaminophen tablet 650 mg (TYLENOL)    DISCONTINUED: riTUXimab 850 mg in sodium chloride 0.9% 165 mL chemo (RITUXAN)    DISCONTINUED: bendamustine 210 mg in sodium chloride 0.9% 50 mL chemo (BENDEKA)    DISCONTINUED: sodium chloride 0.9 % flush 20 mL (NS)    DISCONTINUED: heparin 100 unit/mL lockflush (PF) porcine 300-600 Units    DISCONTINUED: diphenhydrAMINE injection 50 mg (BENADRYL)    DISCONTINUED: famotidine 20 mg/2 mL injection 20 mg (PEPCID)    DISCONTINUED: hydrocortisone sod succ (PF) 100 mg/2 mL injection 100 mg    DISCONTINUED: acetaminophen tablet 1,000 mg (TYLENOL)   2. Anemia associated with chemotherapy     3. Fatigue     4. Bilateral swelling of feet        ______________________________________________________________________________    History of Present Illness    Diagnosis:     1. Follicular lymphoma: Diagnosed in April, 2010 . Stage IV diagnosis with bone marrow involvement. Initial disease involved the cervical, supraclavicular, axillary, mesenteric mass, retroperitoneal nodes, and possibly the manubrium.    Current therapy:   1. Patient restarted bendamustine and Rituxan on February 20, 2017.  Today is cycle 4 day 1     Past treatment:     1. He had 6 cycles of bendamustine and Rituxan completed in October, 2010. He had 2 years of maintenance Rituxan therapy finishing in September, 2012.     Interim History:  Juvenal returns to the clinic today for cycle 4 of chemo and to review CT scan.   He says that the chemotherapy is overall going pretty well.  He has mild fatigue but does continue to work.  Mild swelling in his legs that is pretty stable throughout the day.  No other significant symptoms.  Anxious to hear his scan results.    Pain Status  Currently in Pain: Yes    Review of Systems    Constitutional  Constitutional (WDL): Exceptions to WDL  Fatigue: Fatigue relieved by rest  Neurosensory  Neurosensory (WDL): All neurosensory elements are within defined limits  Eye   Eye  Disorder (WDL): Exceptions to WDL (floaters in eye)  Ear  Ear Disorder (WDL): All ear disorder elements are within defined limits  Cardiovascular  Cardiovascular (WDL): Exceptions to WDL  Edema: Yes  Pulmonary  Respiratory (WDL): Exceptions to WDL  Cough: Mild symptoms, nonprescription intervention indicated  Dyspnea: Shortness of breath with moderate exertion  Gastrointestinal  Gastrointestinal (WDL): Exceptions to WDL  Constipation: Occasional or intermittent symptoms, occasional use of stool softeners, laxatives, dietary modification, or enema  Dysgeusia: Altered taste but no change in diet  Genitourinary  Genitourinary (WDL): All genitourinary elements are within defined limits  Musculoskeletal and Connective Tissue  Musculoskeletal and Connetive Tissue Disorders (WDL): Exceptions to WDL  Arthralgia: Mild pain (shoulders)  Integumentary  Integumentary (WDL): All integumentary elements are within defined limits  Patient Coping  Patient Coping: Accepting  Distress Assessment  Distress Assessment Score: No distress  Accompanied by  Accompanied by: Family Member    Past History  Past Medical History:   Diagnosis Date     Cancer      Non Hodgkin's lymphoma        Physical Exam    Recent Vitals 5/15/2017   Weight 238 lbs 3 oz   /72   Pulse 68   Temp 97.7   Temp src 1   SpO2 98       General: alert, appears stated age and cooperative, here with wife  HEENT: Head: Normocephalic, no lesions, without obvious abnormality.  Pharynx: Dental Hygiene adequate. Normal buccal mucosa. Normal pharynx.  Chest: Normal chest wall and respirations. Clear to auscultation.  Cardiac: regular rate and rhythm, S1, S2 normal, no murmur, click, rub or gallop  Abdomen: abdomen is soft without significant tenderness, masses, organomegaly or guarding  Extremities: normal strength, tone, and muscle mass, patient does have mild swelling bilaterally in his legs.  He does have compression stockings on.  Skin: normal, port is CDI  CNS: normal  without focal findings and mental status, speech normal, alert and oriented x3  Lymphatics: No abnormally enlarged lymph nodes.    Lab Results    Recent Results (from the past 168 hour(s))   POCT creatinine   Result Value Ref Range    POC Creatinine 1.1 mg/dL   POCT GFR   Result Value Ref Range    POC GFR AMER AF HE >60  >60 mL/min/1.73m2    POC GFR NON AMER AF >60  >60 mL/min/1.73m2   Comprehensive Metabolic Panel   Result Value Ref Range    Sodium 140 136 - 145 mmol/L    Potassium 4.0 3.5 - 5.0 mmol/L    Chloride 108 (H) 98 - 107 mmol/L    CO2 25 22 - 31 mmol/L    Anion Gap, Calculation 7 5 - 18 mmol/L    Glucose 152 (H) 70 - 125 mg/dL    BUN 12 8 - 22 mg/dL    Creatinine 1.08 0.70 - 1.30 mg/dL    GFR MDRD Af Amer >60 >60 mL/min/1.73m2    GFR MDRD Non Af Amer >60 >60 mL/min/1.73m2    Bilirubin, Total 0.3 0.0 - 1.0 mg/dL    Calcium 9.0 8.5 - 10.5 mg/dL    Protein, Total 6.0 6.0 - 8.0 g/dL    Albumin 3.7 3.5 - 5.0 g/dL    Alkaline Phosphatase 73 45 - 120 U/L    AST 20 0 - 40 U/L    ALT 26 0 - 45 U/L   HM1 (CBC with Diff)   Result Value Ref Range    WBC 4.4 4.0 - 11.0 thou/uL    RBC 3.96 (L) 4.40 - 6.20 mill/uL    Hemoglobin 10.6 (L) 14.0 - 18.0 g/dL    Hematocrit 32.5 (L) 40.0 - 54.0 %    MCV 82 80 - 100 fL    MCH 26.8 (L) 27.0 - 34.0 pg    MCHC 32.6 32.0 - 36.0 g/dL    RDW 15.6 (H) 11.0 - 14.5 %    Platelets 198 140 - 440 thou/uL    MPV 9.4 8.5 - 12.5 fL    Neutrophils % 68 50 - 70 %    Lymphocytes % 11 (L) 20 - 40 %    Monocytes % 14 (H) 2 - 10 %    Eosinophils % 7 (H) 0 - 6 %    Basophils % 1 0 - 2 %    Neutrophils Absolute 3.0 2.0 - 7.7 thou/uL    Lymphocytes Absolute 0.5 (L) 0.8 - 4.4 thou/uL    Monocytes Absolute 0.6 0.0 - 0.9 thou/uL    Eosinophils Absolute 0.3 0.0 - 0.4 thou/uL    Basophils Absolute 0.0 0.0 - 0.2 thou/uL       Imaging    Ct Chest Abdomen Pelvis Without Oral With Iv Contrast    Result Date: 5/11/2017  CT CHEST, ABDOMEN, AND PELVIS 5/11/2017 3:40 PM      INDICATION: Lymphoma followup.  TECHNIQUE: CT chest, abdomen, and pelvis. Dose reduction techniques were used. IV CONTRAST: Iohexol (Omni) 100 mL. COMPARISON: 12/12/2016. FINDINGS: CHEST: There is mild scarring in both lung apices. The subsolid nodule seen in the right apex on the prior study has resolved. The subsolid nodule seen in the left apex on the prior study has also resolved. 3 mm nodule in the right upper lobe anterior to  the minor fissure on image 60 of series 3 is unchanged. A 6 mm left lower lobe nodule on image 63 of series 3 is unchanged. No new pulmonary nodules or masses. No focal airspace pneumonia, pneumothorax or pleural effusion. There is no hilar or mediastinal lymphadenopathy. The mildly enlarged right paratracheal node and subcarinal node seen on the prior study have resolved. No internal mammary or supraclavicular lymphadenopathy. There are benign appearing fatty replaced lymph nodes in both axillae. No axillary lymphadenopathy.  ABDOMEN: There has been marked decrease in retroperitoneal and mesenteric lymphadenopathy. There is some mild residual retroperitoneal and mesenteric lymphadenopathy with stranding in the fat adjacent to the root of the small bowel mesentery and in the retroperitoneum consistent with interval treatment. No focal splenic lesions. The spleen measures 13.4 x 8.9 x 10.7 cm. There is mild diffuse hepatic steatosis without focal hepatic abnormality. No biliary dilatation or radiopaque gallstone. No abdominal aortic aneurysm. The adrenal glands are normal. There are bilateral rounded low-attenuation foci in the kidneys consistent with renal cysts. No hydronephrosis or solid renal mass. No ureteral stone. There is no evidence for bowel obstruction, free intraperitoneal air or appendicitis. PELVIS: There are benign-appearing fatty replaced nodes in the left external iliac chain and in the right external iliac chain. No pelvic lymphadenopathy by size criteria. There are central calcifications in the  prostate. No bladder stones or masses. There is mild thickening of the bladder wall. No evidence for diverticulitis or abscess. MUSCULOSKELETAL: Negative.     CONCLUSION: 1.  Interval resolution of bilateral apical subsolid pulmonary nodules. Remaining small nodules are stable. 2.  Interval resolution of mild mediastinal lymphadenopathy. 3.  Interval decrease in retroperitoneal and mesenteric lymphadenopathy. Findings are consistent with favorable response to treatment for lymphoma.        Signed by: Madhavi Kurtz, CNP

## 2021-06-10 NOTE — PROGRESS NOTES
Patient arrived ambulatory, accompanied by wife, after seeing NP.  IV of NS initiated at gripper needle in port.  Given premeds PO/IVP/IVPB without problems.  Port flushed with NS between drugs.  Given Rituxan IVPB at 50cc/hr for 30 minutes and increased by 50cc/hr each 30 minutes to 200cc/hr then increased by 100cc/hr every 30 minutes to 400cc/hr final rate.  Patient completed the Rituxan infusion without any problems.  Port flushed with Ns.  Given Bendamustine IVPB over 10 minutes.  Port flushed with NS.  Heparin instilled and needle dced.  Patient instructed to call with questions/concerns/problems.  Patient verbalized understanding.

## 2021-06-10 NOTE — PROGRESS NOTES
BMT Clinic Note     Juvenal Blake is a 55 year old year old male diagnosed with NHL.  He has been treated with Bendamustine + Rituxan then Bendamustine +  Rituxan once again the R-CHOP followed by Nam-NK cell infusion. He is enrolled in protocol [id number], which utilizes Cy/Flu as a lymphodepletion regimen as bridge to Yescarta.    HPI:  Juvenal is feeling bad this morning.  He had a temp to 101 here in clinic this morning with chills.& achiness.  Has chronic cough & SOB, but feels it is a bit worse recently.  Up frequently last night voiding, no burning or bleeding.  Bothered by nausea, but no emesis.  Still having frequent, watery stools, not new.         Review of Systems: 8 point ROS negative except as noted above.    Physical Exam:   Blood pressure 129/57, pulse 97, temperature 100.9  F (38.3  C), temperature source Tympanic, resp. rate 16, weight 109.8 kg (242 lb), SpO2 92 %.  General: NAD   Eyes: : JOE, sclera anicteric   Nose/Mouth/Throat: OP clear, buccal mucosa moist, no ulcerations   Lungs: CTA bilaterally  Cardiovascular: RRR, no M/R/G   Abdominal/Rectal: +BS, soft, NT, ND  Lymphatics: no edema  Skin: no rashes or petechaie  Neuro: A&O   Additional Findings: Jacobs site NT, no drainage.  Labs:  Lab Results   Component Value Date    WBC 13.5 (H) 06/11/2021    ANEU 8.2 06/11/2021    HGB 10.6 (L) 06/11/2021    HCT 34.0 (L) 06/11/2021    PLT 82 (L) 06/11/2021     06/11/2021    POTASSIUM 4.3 06/11/2021    CHLORIDE 102 06/11/2021    CO2 25 06/11/2021     (H) 06/11/2021    BUN 10 06/11/2021    CR 1.15 06/11/2021    MAG 1.8 06/11/2021    INR 0.97 06/04/2021       Assessment and Plan:   1.  Lymphoma:  - He had 6 cycles of bendamustine and Rituxan completed in October, 2010. He had 2 years of maintenance Rituxan therapy finishing in September, 2012.  - Second course of bendamustine plus rituximab started February 20, 2017. Cycle 6 was completed on July 11, 2017. Then had maintenance rituximab  through January, 2019.  - O-CHOP started at second relapse. Cycle 1 given April 23, 2019. Cycle 6 given August 16, 2019.  Haplo NAM-NK therapy at [facility] September 1, 2020  - Iidelalisib started October 21, 2020 [facility]. 150 mg twice daily.      -  eligible to proceed with therapy with .  stopped Idelalisib - last dose was on 6/10 at 8am. Half life is 8 1/2 hours - x 5 = 42 hours. Plan was to begin LD chemotherapy 6/11 and receive  cells on 6/16, but since he has a new fever today will hold off through the weekend.  LDH trending up so should not postpone chemo too long.    2.  HEME:  No need for transfusions today    3.  Prostate:   Initial radical prostatectomy and bilateral lymph node dissection. November 15, 2017. Prostatic adenocarcinoma C[place] 4+3 = 7 with tertiary pattern 5. Tumor involves 45% of total surface area. Positive for extensive extraprostatic extension. Positive for bilateral seminal vesicle invasion. Multiple margins positive. Lymphovascular invasion not identified. Perineural invasion was noted. Lymph nodes negative. 3 were examined (2 right obturator and 1 left obturator).    Completed radiation to the prostate fossa and pelvic lymph nodes at [facility] early May 2018. He received 4500 cGy in 25 fractions. He then had 2340 cGy boost in 13 fractions to the prostatic fossa, for a total dose of 6240 cGy in 38 treatment fractions delivered over 54 days. He did not receive hormonal therapy:      4.  ID:  Febrile, not neutropenic but scheduled to begin LD chemo today.  Will not give chemo today.  - Send blood cx, RVP & repeat Covid.    - Rocephin 2gm daily through the weekend while awaiting cx results  - CXR today does not appear changed from a few days ago, but radiology report pending.    5.  FEN:  Lytes & creat stable    RTC sat/sun for lab, provider & Rocephin    Review of the result(s) of each unique test - cbc, cmp, mg, ldh, cxr, covid  40 minutes spent on the date of  the encounter doing chart review, history and exam, documentation and further activities per the note

## 2021-06-10 NOTE — TELEPHONE ENCOUNTER
Contacted Juvenal to discuss appts for chemo in the clinic and on 5C (6/12), picc placement and phase 1 admit 6/16. Also reviewed need for COVID test prior to admits. Juvenal verbalized understanding of plan and will call if he has questions.

## 2021-06-10 NOTE — PROGRESS NOTES
"Patient arrived ambulatory, accompanied by wife.  States that he has had no problems with SE last evening or today, but did not take any Compazine pills for nausea.  Wondering if the Compazine is what is making him feel \"shaky and sick.\"  Port accessed under aseptic technique - excellent blood return noted.  IV of NS initiated for treatment.  Given premed IVP as ordered.  Given Bendamustine IVPB over 10 minutes.  Port flushed with NS.  Heparin instilled and needle dced.  Dressing applied.  Patient left ambulatory, accompanied by wife, in stable condition.  Instructed to call wit questions/concerns/problems.  Patient verbalized understanding.  "

## 2021-06-10 NOTE — LETTER
6/10/2021         RE: Juvenal Blake  [address] [place] [address]        Dear Colleague,    Thank you for referring your patient, Juvenal Blake, to the [facility] [place]. Please see a copy of my visit note below.    Cell therapy new patient clinic note.  06/10/21       I had the pleasure to see Mr. Blake in BMT clinic to review the work-up studies in anticipation to proceed with clinical trial using +IL2 for his R/R Follicular lymphoma.    INTERVAL HISTORY: Juvenal is feeling tired, has intermittent abdominal pain, but no N/V/ or constipation.  He is eating well, no night sweats or fevers, he is still on Zydelig, last dose 6/10 8am.  His inguinal LNbx confirmed follicular lymphoma grade 1-2, CD20 positive.  Bone Marrow Bx showed involved by FL at 50% - this explains his dropping blood counts.    His other work-up studies are described below.    ONCOLOGY HISTORY:  1. Follicular lymphoma: Diagnosed in April, 2010 . Stage IV diagnosis with bone marrow involvement. Initial disease involved the cervical, supraclavicular, axillary, mesenteric mass, retroperitoneal nodes, and possibly the manubrium.  Relapse noted on imaging in December 2016. Axillary node biopsy from February 2017 shows low-grade follicle center cell lymphoma.  Second relapse on imaging January 2019. Repeat biopsy January 7, 2019 of a right medial thigh lymph node shows recurrent follicular lymphoma. Grade 2.  Third relapse on imaging in May, 2020. Pathologic confirmation May 28, 2020 from a right axillary lymph node excisional biopsy. He was treated with NAM-NK +IL2 and had progressive disease.  In October 2020, he started Idelalisib and initially had partial response with excellent tolerance but in MAY 2021, he presented with dramatic progression of the lymphadenopathy involving multiple areas; the largest lymph nodes are in mesentery and retroperitoneal sites measuring more then 5 cm  in diameter. SUV is around 10, there is also rapidly growing right inguinal and upper femoral lymph node.    Treatment:  Lymphoma:   He had 6 cycles of bendamustine and Rituxan completed in October, 2010. He had 2 years of maintenance Rituxan therapy finishing in September, 2012.    Second course of bendamustine plus rituximab started February 20, 2017. Cycle 6 was completed on July 11, 2017. Then had maintenance rituximab through January, 2019.    O-CHOP started at second relapse. Cycle 1 given April 23, 2019. Cycle 6 given August 16, 2019.  Haplo NAM[facility]NK therapy at [facility] September 1, 2020    Iidelalisib started October 21, 2020 at [facility]. 150 mg twice daily. Last dose on Nehal 10, 2021    Other PMHx:  2. Prostate cancer: Pathologic stage T3b prostate cancer. Positive margins, multiple, and surgery. 3 lymph nodes were negative. High risk for recurrent disease. He is being followed at [facility]. Maricel 4+3 = 7. Tertiary Maricel pattern 5 and preoperative PSA of 27.   Initial radical prostatectomy and bilateral lymph node dissection. November 15, 2017. Prostatic adenocarcinoma [place] 4+3 = 7 with tertiary pattern 5. Tumor involves 45% of total surface area. Positive for extensive extraprostatic extension. Positive for bilateral seminal vesicle invasion. Multiple margins positive. Lymphovascular invasion not identified. Perineural invasion was noted. Lymph nodes negative. 3 were examined (2 right obturator and 1 left obturator).    Completed radiation to the prostate fossa and pelvic lymph nodes at [facility] early May 2018. He received 4500 cGy in 25 fractions. He then had 2340 cGy boost in 13 fractions to the prostatic fossa, for a total dose of 6240 cGy in 38 treatment fractions delivered over 54 days. He did not receive hormonal therapy.    Had PSA rechecked serially, last on 5/21/2021 0.7    3. Hypogammaglobulinemia: He has received intermittent doses of IVIG.      Social history   He is  retired for last 5 years has worked in [employer] industry.  He lives with his wife they live in [facility] there are no care giver issues.     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 PSA checked regulary     Sleep apnea   CPAP     Physical Exam: chronically ill appearing, in good spirits.  /80   Pulse 72   Temp 98.3  F (36.8  C) (Oral)   Resp 18   Wt 112.9 kg (249 lb)   SpO2 98%   BMI 37.31 kg/m  . ECOG 1    General: patient appears stated age of 55 y.o.. Nontoxic and in no distress. Some abd pain.   HEENT: Head: atraumatic, normocephalic. Sclerae anicteric.  Chest: Normal respiratory effort.   Cardiac: No edema.   Abdomen: abdomen is round, distended not non-tender, no HSM or definite mass   Extremities: normal tone and muscle bulk.  Skin: no lesions or rash. Warm and dry.   CNS: alert and oriented. Grossly non-focal.   Psychiatric: normal mood and affect.   Nodes: No palpable cervical or supraclavicular nodes.  Neuro: AAOx3, CN II-XII intact, symmetrical strenght in upper and lower extremities    Lab Results:  Lab Results   Component Value Date    WBC 11.7 (H) 06/08/2021    ANEU 4.2 06/08/2021    HGB 10.4 (L) 06/08/2021    HCT 33.4 (L) 06/08/2021    PLT 87 (L) 06/08/2021     06/04/2021    POTASSIUM 4.2 06/04/2021    CHLORIDE 105 06/04/2021    CO2 27 06/04/2021     (H) 06/04/2021    BUN 16 06/04/2021    CR 1.10 06/04/2021    MAG 2.2 06/04/2021    INR 0.97 06/04/2021    AST 21 06/04/2021    ALT 40 06/04/2021     Imaging:  Result Date: 5/17/2021  EXAM: NM PET CT : Increasing size and metabolic activity of bilateral retroperitoneal (max SUV 10.5) lymph  nodes with development of left supraclavicular (max SUV 3.0), bilateral retropectoral (max SUV 6.4), upper pretracheal station 2 (max SUV 6.0), bilateral lower paratracheal station 4R (max SUV 6.0), subcarinal station 7 (max SUV 5.9), right hilar station 10R (max SUV 5.5), left greater than right paraspinal (max SUV 5.6), retrocrural (max SUV 9.0), portacaval/peripancreatic (max SUV 7.0), mesenteric (Max SUV 7.1), common iliac (max SUV 8.3), left greater than right external iliac (max SUV 4.3), and left inguinal (max SUV 5.4) lymph nodes with diffuse FDG uptake throughout the splenic parenchyma (max SUV 4.5), which is greater than liver background (max SUV 4.1) suspicious for lymphomatous involvement progression of disease. Mild carotid artery bifurcation calcification. Right chest port with tip terminating near the superior cavoatrial junction. Mild diffuse hepatic steatosis. Right renal cyst. Prostatectomy. Pelvic phleboliths. Multilevel degenerative changes of spine.     Increasing metabolic activity of pre-existing lymph nodes and development of a hypermetabolic lymph nodes above and below the diaphragm with involvement of the splenic parenchyma suspicious for progression of disease.    BMBx: invovled by lymphoma -50%, flow 35% B-cell CD10+ CD20+ kappa moniotypic  PFTs: DLCO corrected 88  CXR neg  Virology:  CMV+, HSV+, EVC+m neg for Hep B, Bep C, W Nile, RRR, Chagas, HIV,   Ferritin 141  CRP 40.6  LFTs normal  Lab Results   Component Value Date    WBC 11.7 (H) 06/08/2021    ANEU 4.2 06/08/2021    HGB 10.4 (L) 06/08/2021    HCT 33.4 (L) 06/08/2021    PLT 87 (L) 06/08/2021     06/04/2021    POTASSIUM 4.2 06/04/2021    CHLORIDE 105 06/04/2021    CO2 27 06/04/2021     (H) 06/04/2021    BUN 16 06/04/2021    CR 1.10 06/04/2021    MAG 2.2 06/04/2021    INR 0.97 06/04/2021    AST 21 06/04/2021    ALT 40 06/04/2021         Assessment and plan:    In summary  is 55 years old patient with a multiply  relapsed follicular lymphoma; the most recent duration of response was 7 months (Idelalisib). Now biopsy proven relapse with FL grade 1-2. CD20 is brightly expressed.       He is now symptomatic with progressing disease involving multiple sites mostly abdominal adenopathy. His disease is measurable in abd and inguinal LN plus marrow involvement.     LDH and uric acid are stable but he is developing thrombocytopenia c/w marrow involvement by lymphoma.    He is now eligible to proceed with therapy with .  He will stop Idelalisib - last dose was on 6/10 at 8am. Half life is 8 1/2 hours - x 5 = 42 hours. Patient is ok to start LD chemotherapy tomorrow 6/11 and received  cells on 6/16.      Juvenal is given a sample of the calendar and we signed the eligibility form today.    Rosa Terry MD  Professor of Medicine  [phone]              Again, thank you for allowing me to participate in the care of your patient.        Sincerely,        Rosa Terry MD

## 2021-06-10 NOTE — PROGRESS NOTES
[facility] Hematology and Oncology Progress Note    Patient: Juvenal Blake  MRN: [id number]  Date of Service:  April 18/2017      Assessment and Plan:    1.  Follicular lymphoma: He is here today for cycle 3 of chemotherapy.  We will give it 100% dosing.  Return to clinic in 4 weeks for cycle 4.  We will get a CAT scan just prior.  Overall is tolerating therapy well.  No clinical evidence of progressive disease.    2.  Anxiety/uneasiness: For about 2 weeks after cycle 2 he had sensation of being somewhat anxious her somewhat appetite.  Possibly itching.  He was given Benadryl with mild relief.  He was having difficulty sleeping.  I gave him a prescription today for Ativan to take in the evening and prior to bed.  I told him if it is happening during the day at work to call us and we will give him some nonsedating.    3.  Elevated TSH: We'll follow for now.  He does not have a history of hypothyroidism.    4.  Mouth sores I asked him to use a salt and soda rinse 2-3 times a day.  Prescription for Magic mouthwash was given.    ECOG Performance   ECOG Performance Status: 1    Distress Assessment  Distress Assessment Score: No distress    Pain  Currently in Pain: Yes  Pain Score (Initial OR Reassessment): 4  Location: shoulders    Diagnosis:    1.  Follicular lymphoma: Diagnosed in April, 2010 . Stage IV diagnosis with bone marrow involvement.  Initial disease involved the cervical, supraclavicular, axillary, mesenteric mass, retroperitoneal nodes, and possibly the manubrium.    Treatment:    1.  He had 6 cycles of bendamustine and Rituxan completed in October, 2010.  He had 2 years of maintenance Rituxan therapy finishing in September, 2012.    2.  Second course of bendamustine plus rituximab started February 20, 2017    Interim History:    Juvenal returns today for follow-up visit.  He is here for cycle 3 of chemotherapy.  Tolerating things well.  He did have some anxiety and some insomnia after his last treatment.   Seem to feel similar to an infusion type reaction.  He also had some mild mouth sores.  No nausea vomiting.  No shortness of breath.  No neurologic symptoms.  Denies abdominal pain or change in bowel or bladder habits.    Review of Systems:    Constitutional  Constitutional (WDL): Exceptions to WDL  Fatigue: Fatigue not relieved by rest - Limiting instrumental ADL  Neurosensory  Neurosensory (WDL): Exceptions to WDL  Ataxia: Asymptomatic, clinical or diagnostic observations only, intervention not indicated (off balance worse in am)  Cardiovascular  Cardiovascular (WDL): Exceptions to WDL  Edema: Yes  Edema Limbs: 5 - 10% inter-limb discrepancy in volume or circumference at point of greatest visible difference, swelling or obscuration of anatomic architecture on close inspection (legs and feet wears compression stockings)  Pulmonary  Respiratory (WDL): Exceptions to WDL  Cough: Mild symptoms, nonprescription intervention indicated (dry)  Dyspnea: Shortness of breath with moderate exertion  Gastrointestinal  Gastrointestinal (WDL): Exceptions to WDL  Constipation: Occasional or intermittent symptoms, occasional use of stool softeners, laxatives, dietary modification, or enema  Dysgeusia: Altered taste but no change in diet  Genitourinary  Genitourinary (WDL): All genitourinary elements are within defined limits  Integumentary  Integumentary (WDL): All integumentary elements are within defined limits  Patient Coping  Patient Coping: Accepting  Accompanied by  Accompanied by: Family Member    Past History:    Past Medical History:   Diagnosis Date     Cancer      Non Hodgkin's lymphoma         Physical Exam:    Recent Vitals 4/18/2017   Weight 241 lbs 5 oz   /74   Pulse 71   Temp 97.8   Temp src 1   SpO2 97     General: patient appears stated age of 51 y.o.. Nontoxic and in no distress.   HEENT: Head: atraumatic, normocephalic. Sclerae anicteric.  Chest:  Normal respiratory effort.    Cardiac: No edema.  Abdomen:  abdomen is soft, non-distended  Extremities: normal tone and muscle bulk.   Skin: no lesions or rash. Warm and dry.   CNS: alert and oriented x3. Grossly non-focal.   Psychiatric: normal mood and affect.     Lab Results:    Recent Results (from the past 168 hour(s))   Comprehensive Metabolic Panel   Result Value Ref Range    Sodium 142 136 - 145 mmol/L    Potassium 4.0 3.5 - 5.0 mmol/L    Chloride 106 98 - 107 mmol/L    CO2 27 22 - 31 mmol/L    Anion Gap, Calculation 9 5 - 18 mmol/L    Glucose 162 (H) 70 - 125 mg/dL    BUN 15 8 - 22 mg/dL    Creatinine 1.07 0.70 - 1.30 mg/dL    GFR MDRD Af Amer >60 >60 mL/min/1.73m2    GFR MDRD Non Af Amer >60 >60 mL/min/1.73m2    Bilirubin, Total 0.5 0.0 - 1.0 mg/dL    Calcium 9.3 8.5 - 10.5 mg/dL    Protein, Total 6.2 6.0 - 8.0 g/dL    Albumin 3.8 3.5 - 5.0 g/dL    Alkaline Phosphatase 76 45 - 120 U/L    AST 22 0 - 40 U/L    ALT 29 0 - 45 U/L   HM1 (CBC with Diff)   Result Value Ref Range    WBC 4.4 4.0 - 11.0 thou/uL    RBC 4.21 (L) 4.40 - 6.20 mill/uL    Hemoglobin 11.2 (L) 14.0 - 18.0 g/dL    Hematocrit 34.8 (L) 40.0 - 54.0 %    MCV 83 80 - 100 fL    MCH 26.6 (L) 27.0 - 34.0 pg    MCHC 32.2 32.0 - 36.0 g/dL    RDW 15.3 (H) 11.0 - 14.5 %    Platelets 178 140 - 440 thou/uL    MPV 9.8 8.5 - 12.5 fL    Neutrophils % 64 50 - 70 %    Lymphocytes % 9 (L) 20 - 40 %    Monocytes % 14 (H) 2 - 10 %    Eosinophils % 12 (H) 0 - 6 %    Basophils % 1 0 - 2 %    Neutrophils Absolute 2.8 2.0 - 7.7 thou/uL    Lymphocytes Absolute 0.4 (L) 0.8 - 4.4 thou/uL    Monocytes Absolute 0.6 0.0 - 0.9 thou/uL    Eosinophils Absolute 0.5 (H) 0.0 - 0.4 thou/uL    Basophils Absolute 0.0 0.0 - 0.2 thou/uL     Imaging:    No results found.      Signed by: Rob Crooks MD

## 2021-06-10 NOTE — PROGRESS NOTES
Pt here for day 2 txt. PT states itching from txt yesterday stopped when he got home. PT reports some fatigue but no other symptoms from txt yesterday. Zofran not given due to itching occurring yesterday. Port accessed sterile techique, brisk blood return/smooth ns flush. Txt adminstered as ordered. txt completed and tubing flushed with ns and port flushed with heparin/deaccessesd with 2x2 to site. Follow up reviewed and pt dc'd steady gait.

## 2021-06-10 NOTE — PROGRESS NOTES
[id number]: Study Visit Note  in lymphoma   Subject name: Juvenal MCGRAW Hayden     Visit: Screening close visit    Did the study visit occur within the appropriate window allowed by the protocol? yes    Today I was present with Dr. Terry and her screening close visit with Juvenal and his wife. He states today that he will use condoms for contraceptives and will agree to not get anyone pregnant. He also stated today that he is having nausea without emesis and is having no issues with bowel movements and has regular bowel movements.    We reviewed the calendar and schedule of events for the study. We discussed that he will go directly to 5C phase 1 unit for chemotherapy on Saturday to arrive at 9 am. He will also go to clinic tomorrow Friday and Sunday for LD chemo. He is also aware that he will go to the clinic on Wednesday th 16th for a PICC placement prior to clinic and the phase 1 unit.     I have personally interviewed Juvenal Blake and reviewed his medical record for adverse events and concomitant medications and these have been recorded on the corresponding logs in Juvenal Blake's research file.     Juvenal Blake was given the opportunity to ask any trial related questions.  Please see provider progress note for physical exam and other clinical information.   Sarah Beth Kessler RN

## 2021-06-10 NOTE — PROGRESS NOTES
Cell therapy new patient clinic note.  06/10/21       I had the pleasure to see Mr. Blake in BMT clinic to review the work-up studies in anticipation to proceed with clinical trial using +IL2 for his R/R Follicular lymphoma.    INTERVAL HISTORY: Juvenal is feeling tired, has intermittent abdominal pain, but no N/V/ or constipation.  He is eating well, no night sweats or fevers, he is still on Zydelig, last dose 6/10 8am.  His inguinal LNbx confirmed follicular lymphoma grade 1-2, CD20 positive.  Bone Marrow Bx showed involved by FL at 50% - this explains his dropping blood counts.    His other work-up studies are described below.    ONCOLOGY HISTORY:  1. Follicular lymphoma: Diagnosed in April, 2010 . Stage IV diagnosis with bone marrow involvement. Initial disease involved the cervical, supraclavicular, axillary, mesenteric mass, retroperitoneal nodes, and possibly the manubrium.  Relapse noted on imaging in December 2016. Axillary node biopsy from February 2017 shows low-grade follicle center cell lymphoma.  Second relapse on imaging January 2019. Repeat biopsy January 7, 2019 of a right medial thigh lymph node shows recurrent follicular lymphoma. Grade 2.  Third relapse on imaging in May, 2020. Pathologic confirmation May 28, 2020 from a right axillary lymph node excisional biopsy. He was treated with NAM-NK +IL2 and had progressive disease.  In October 2020, he started Idelalisib and initially had partial response with excellent tolerance but in MAY 2021, he presented with dramatic progression of the lymphadenopathy involving multiple areas; the largest lymph nodes are in mesentery and retroperitoneal sites measuring more then 5 cm in diameter. SUV is around 10, there is also rapidly growing right inguinal and upper femoral lymph node.    Treatment:  Lymphoma:   He had 6 cycles of bendamustine and Rituxan completed in October, 2010. He had 2 years of maintenance Rituxan therapy finishing in September,  2012.    Second course of bendamustine plus rituximab started February 20, 2017. Cycle 6 was completed on July 11, 2017. Then had maintenance rituximab through January, 2019.    O-CHOP started at second relapse. Cycle 1 given April 23, 2019. Cycle 6 given August 16, 2019.  Haplo NAM-NK therapy at [facility] September 1, 2020    Iidelalisib started October 21, 2020 at [facility]. 150 mg twice daily. Last dose on Nehal 10, 2021    Other PMHx:  2. Prostate cancer: Pathologic stage T3b prostate cancer. Positive margins, multiple, and surgery. 3 lymph nodes were negative. High risk for recurrent disease. He is being followed at [facility]. Jefferson 4+3 = 7. Tertiary Maricel pattern 5 and preoperative PSA of 27.   Initial radical prostatectomy and bilateral lymph node dissection. November 15, 2017. Prostatic adenocarcinoma Jefferson 4+3 = 7 with tertiary pattern 5. Tumor involves 45% of total surface area. Positive for extensive extraprostatic extension. Positive for bilateral seminal vesicle invasion. Multiple margins positive. Lymphovascular invasion not identified. Perineural invasion was noted. Lymph nodes negative. 3 were examined (2 right obturator and 1 left obturator).    Completed radiation to the prostate fossa and pelvic lymph nodes at [facility] early May 2018. He received 4500 cGy in 25 fractions. He then had 2340 cGy boost in 13 fractions to the prostatic fossa, for a total dose of 6240 cGy in 38 treatment fractions delivered over 54 days. He did not receive hormonal therapy.    Had PSA rechecked serially, last on 5/21/2021 0.7    3. Hypogammaglobulinemia: He has received intermittent doses of IVIG.      Social history   He is retired for last 5 years has worked in [employer].  He lives with his wife they live in [facility] [place] there are no care giver issues.     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 PSA checked regulary     Sleep apnea   CPAP     Physical Exam: chronically ill appearing, in good spirits.  /80   Pulse 72   Temp 98.3  F (36.8  C) (Oral)   Resp 18   Wt 112.9 kg (249 lb)   SpO2 98%   BMI 37.31 kg/m  . ECOG 1    General: patient appears stated age of 55 y.o.. Nontoxic and in no distress. Some abd pain.   HEENT: Head: atraumatic, normocephalic. Sclerae anicteric.  Chest: Normal respiratory effort.   Cardiac: No edema.   Abdomen: abdomen is round, distended not non-tender, no HSM or definite mass   Extremities: normal tone and muscle bulk.  Skin: no lesions or rash. Warm and dry.   CNS: alert and oriented. Grossly non-focal.   Psychiatric: normal mood and affect.   Nodes: No palpable cervical or supraclavicular nodes.  Neuro: AAOx3, CN II-XII intact, symmetrical strenght in upper and lower extremities    Lab Results:  Lab Results   Component Value Date    WBC 11.7 (H) 06/08/2021    ANEU 4.2 06/08/2021    HGB 10.4 (L) 06/08/2021    HCT 33.4 (L) 06/08/2021    PLT 87 (L) 06/08/2021     06/04/2021    POTASSIUM 4.2 06/04/2021    CHLORIDE 105 06/04/2021    CO2 27 06/04/2021     (H) 06/04/2021    BUN 16 06/04/2021    CR 1.10 06/04/2021    MAG 2.2 06/04/2021    INR 0.97 06/04/2021    AST 21 06/04/2021    ALT 40 06/04/2021     Imaging:  Result Date: 5/17/2021  EXAM: NM PET CT : Increasing size and metabolic activity of bilateral retroperitoneal (max SUV 10.5) lymph nodes with development of left supraclavicular (max SUV 3.0), bilateral retropectoral (max SUV 6.4), upper pretracheal station 2 (max SUV 6.0), bilateral lower paratracheal station 4R (max SUV 6.0), subcarinal station 7 (max SUV 5.9), right hilar station 10R (max SUV 5.5),  left greater than right paraspinal (max SUV 5.6), retrocrural (max SUV 9.0), portacaval/peripancreatic (max SUV 7.0), mesenteric (Max SUV 7.1), common iliac (max SUV 8.3), left greater than right external iliac (max SUV 4.3), and left inguinal (max SUV 5.4) lymph nodes with diffuse FDG uptake throughout the splenic parenchyma (max SUV 4.5), which is greater than liver background (max SUV 4.1) suspicious for lymphomatous involvement progression of disease. Mild carotid artery bifurcation calcification. Right chest port with tip terminating near the superior cavoatrial junction. Mild diffuse hepatic steatosis. Right renal cyst. Prostatectomy. Pelvic phleboliths. Multilevel degenerative changes of spine.     Increasing metabolic activity of pre-existing lymph nodes and development of a hypermetabolic lymph nodes above and below the diaphragm with involvement of the splenic parenchyma suspicious for progression of disease.    BMBx: invovled by lymphoma -50%, flow 35% B-cell CD10+ CD20+ kappa moniotypic  PFTs: DLCO corrected 88  CXR neg  Virology:  CMV+, HSV+, EVC+m neg for Hep B, Bep C, W Nile, RRR, Chagas, HIV,   Ferritin 141  CRP 40.6  LFTs normal  Lab Results   Component Value Date    WBC 11.7 (H) 06/08/2021    ANEU 4.2 06/08/2021    HGB 10.4 (L) 06/08/2021    HCT 33.4 (L) 06/08/2021    PLT 87 (L) 06/08/2021     06/04/2021    POTASSIUM 4.2 06/04/2021    CHLORIDE 105 06/04/2021    CO2 27 06/04/2021     (H) 06/04/2021    BUN 16 06/04/2021    CR 1.10 06/04/2021    MAG 2.2 06/04/2021    INR 0.97 06/04/2021    AST 21 06/04/2021    ALT 40 06/04/2021         Assessment and plan:    In summary  is 55 years old patient with a multiply relapsed follicular lymphoma; the most recent duration of response was 7 months (Idelalisib). Now biopsy proven relapse with FL grade 1-2. CD20 is brightly expressed.       He is now symptomatic with progressing disease involving multiple sites mostly abdominal adenopathy.  His disease is measurable in abd and inguinal LN plus marrow involvement.     LDH and uric acid are stable but he is developing thrombocytopenia c/w marrow involvement by lymphoma.    He is now eligible to proceed with therapy with .  He will stop Idelalisib - last dose was on 6/10 at 8am. Half life is 8 1/2 hours - x 5 = 42 hours. Patient is ok to start LD chemotherapy tomorrow 6/11 and received  cells on 6/16.      Juvenal is given a sample of the calendar and we signed the eligibility form today.    Rosa Terry MD  Professor of Medicine  [phone]

## 2021-06-10 NOTE — NURSING NOTE
"Oncology Rooming Note    Nehal 10, 2021 12:01 PM   Juvenal Blake is a 55 year old male who presents for:    Chief Complaint   Patient presents with     Oncology Clinic Visit     BMT MENDEZ CLOSE; Follicular lymphoma grade i, lymph nodes of multiple sites (H)      Initial Vitals: /80   Pulse 72   Temp 98.3  F (36.8  C) (Oral)   Resp 18   Wt 112.9 kg (249 lb)   SpO2 98%   BMI 37.31 kg/m   Estimated body mass index is 37.31 kg/m  as calculated from the following:    Height as of 6/4/21: 1.74 m (5' 8.5\").    Weight as of this encounter: 112.9 kg (249 lb). Body surface area is 2.34 meters squared.  Mild Pain (2) Comment: Data Unavailable   No LMP for male patient.  Allergies reviewed: Yes  Medications reviewed: Yes    Medications: Medication refills not needed today.  Pharmacy name entered into [facility]: [id number] - [place], [place] - [address] AT [facility]    Clinical concerns: None.       Rox Ward MA            "

## 2021-06-10 NOTE — PROGRESS NOTES
Pt here for cycle 4 day 1 txt. Pt states doing well and fells better after txt if he doesn't take antinausea medication.  Labs approved for txt and txt reviewed with pt. Premeds given tyelnol , pepcid, benadry , emend then zofran/dexamethasone. After zofran with dexamethasone administered pt started to itch mildly and felt antsy. Reported to Madhavi OLMOS and ativan 0.5mg ivp given.  Rituxan started at rapid infusion rated starting at 100 ml per hour then increased after 30 minutes to 200 ml per hour for remainder of txt. Pt reports feeling better after iv ativan.Tubing then flushed with ns then bendamustine infused over 10 minutes. Tubing flushed with ns . Port flushed with heparin/deaccessed with 2x2 to site. Pr tolerated txt without any further side effects. PT dc'd steady gait with wife and will return tomorrow for day 2 txt.  Spoke to Madhavi OLMOS and cj put as an allergy and removed from treatment plan.

## 2021-06-11 ENCOUNTER — ONCOLOGY VISIT (OUTPATIENT)
Dept: TRANSPLANT | Facility: CLINIC | Age: 56
End: 2021-06-11
Attending: NURSE PRACTITIONER
Payer: COMMERCIAL

## 2021-06-11 ENCOUNTER — ANCILLARY PROCEDURE (OUTPATIENT)
Dept: GENERAL RADIOLOGY | Facility: CLINIC | Age: 56
End: 2021-06-11
Attending: NURSE PRACTITIONER
Payer: COMMERCIAL

## 2021-06-11 ENCOUNTER — INFUSION THERAPY VISIT (OUTPATIENT)
Dept: TRANSPLANT | Facility: CLINIC | Age: 56
End: 2021-06-11
Payer: COMMERCIAL

## 2021-06-11 ENCOUNTER — APPOINTMENT (OUTPATIENT)
Dept: LAB | Facility: CLINIC | Age: 56
End: 2021-06-11
Attending: NURSE PRACTITIONER
Payer: COMMERCIAL

## 2021-06-11 VITALS
RESPIRATION RATE: 16 BRPM | DIASTOLIC BLOOD PRESSURE: 57 MMHG | SYSTOLIC BLOOD PRESSURE: 129 MMHG | TEMPERATURE: 100.9 F | HEART RATE: 97 BPM | WEIGHT: 242 LBS | OXYGEN SATURATION: 92 % | BODY MASS INDEX: 36.26 KG/M2

## 2021-06-11 DIAGNOSIS — C82.00 FOLLICULAR LYMPHOMA GRADE I, UNSPECIFIED BODY REGION (H): ICD-10-CM

## 2021-06-11 DIAGNOSIS — C82.08 FOLLICULAR LYMPHOMA GRADE I, LYMPH NODES OF MULTIPLE SITES (H): Primary | ICD-10-CM

## 2021-06-11 DIAGNOSIS — C82.08 FOLLICULAR LYMPHOMA GRADE I, LYMPH NODES OF MULTIPLE SITES (H): ICD-10-CM

## 2021-06-11 LAB
ALBUMIN SERPL-MCNC: 3.1 G/DL (ref 3.4–5)
ALP SERPL-CCNC: 97 U/L (ref 40–150)
ALT SERPL W P-5'-P-CCNC: 33 U/L (ref 0–70)
ANION GAP SERPL CALCULATED.3IONS-SCNC: 12 MMOL/L (ref 3–14)
ANISOCYTOSIS BLD QL SMEAR: SLIGHT
AST SERPL W P-5'-P-CCNC: 20 U/L (ref 0–45)
BASOPHILS # BLD AUTO: 0 10E9/L (ref 0–0.2)
BASOPHILS NFR BLD AUTO: 0 %
BILIRUB DIRECT SERPL-MCNC: 0.2 MG/DL (ref 0–0.2)
BILIRUB SERPL-MCNC: 0.6 MG/DL (ref 0.2–1.3)
BUN SERPL-MCNC: 10 MG/DL (ref 7–30)
C PNEUM DNA SPEC QL NAA+PROBE: NOT DETECTED
CALCIUM SERPL-MCNC: 9.2 MG/DL (ref 8.5–10.1)
CHLORIDE SERPL-SCNC: 102 MMOL/L (ref 94–109)
CO2 SERPL-SCNC: 25 MMOL/L (ref 20–32)
CREAT SERPL-MCNC: 1.15 MG/DL (ref 0.66–1.25)
DACRYOCYTES BLD QL SMEAR: SLIGHT
DIFFERENTIAL METHOD BLD: ABNORMAL
EOSINOPHIL # BLD AUTO: 0.7 10E9/L (ref 0–0.7)
EOSINOPHIL NFR BLD AUTO: 5.1 %
ERYTHROCYTE [DISTWIDTH] IN BLOOD BY AUTOMATED COUNT: 15.9 % (ref 10–15)
FLUAV H1 2009 PAND RNA SPEC QL NAA+PROBE: NOT DETECTED
FLUAV H1 RNA SPEC QL NAA+PROBE: NOT DETECTED
FLUAV H3 RNA SPEC QL NAA+PROBE: NOT DETECTED
FLUAV RNA SPEC QL NAA+PROBE: NOT DETECTED
FLUBV RNA SPEC QL NAA+PROBE: NOT DETECTED
GFR SERPL CREATININE-BSD FRML MDRD: 70 ML/MIN/{1.73_M2}
GLUCOSE SERPL-MCNC: 130 MG/DL (ref 70–99)
HADV DNA SPEC QL NAA+PROBE: NOT DETECTED
HCOV PNL SPEC NAA+PROBE: NOT DETECTED
HCT VFR BLD AUTO: 34 % (ref 40–53)
HGB BLD-MCNC: 10.6 G/DL (ref 13.3–17.7)
HMPV RNA SPEC QL NAA+PROBE: NOT DETECTED
HPIV1 RNA SPEC QL NAA+PROBE: NOT DETECTED
HPIV2 RNA SPEC QL NAA+PROBE: NOT DETECTED
HPIV3 RNA SPEC QL NAA+PROBE: NOT DETECTED
HPIV4 RNA SPEC QL NAA+PROBE: NOT DETECTED
LABORATORY COMMENT REPORT: NORMAL
LDH SERPL L TO P-CCNC: 252 U/L (ref 85–227)
LYMPHOCYTES # BLD AUTO: 3.7 10E9/L (ref 0.8–5.3)
LYMPHOCYTES NFR BLD AUTO: 27.7 %
M PNEUMO DNA SPEC QL NAA+PROBE: NOT DETECTED
MAGNESIUM SERPL-MCNC: 1.8 MG/DL (ref 1.6–2.3)
MCH RBC QN AUTO: 28.3 PG (ref 26.5–33)
MCHC RBC AUTO-ENTMCNC: 31.2 G/DL (ref 31.5–36.5)
MCV RBC AUTO: 91 FL (ref 78–100)
MICROBIOLOGIST REVIEW: ABNORMAL
MONOCYTES # BLD AUTO: 0.9 10E9/L (ref 0–1.3)
MONOCYTES NFR BLD AUTO: 6.7 %
NEUTROPHILS # BLD AUTO: 8.2 10E9/L (ref 1.6–8.3)
NEUTROPHILS NFR BLD AUTO: 60.5 %
PHOSPHATE SERPL-MCNC: 2.4 MG/DL (ref 2.5–4.5)
PLATELET # BLD AUTO: 82 10E9/L (ref 150–450)
PLATELET # BLD EST: ABNORMAL 10*3/UL
POIKILOCYTOSIS BLD QL SMEAR: SLIGHT
POTASSIUM SERPL-SCNC: 4.3 MMOL/L (ref 3.4–5.3)
PROT SERPL-MCNC: 6.6 G/DL (ref 6.8–8.8)
RBC # BLD AUTO: 3.74 10E12/L (ref 4.4–5.9)
RSV RNA SPEC QL NAA+PROBE: NOT DETECTED
RSV RNA SPEC QL NAA+PROBE: NOT DETECTED
RV+EV RNA SPEC QL NAA+PROBE: ABNORMAL
SARS-COV-2 RNA RESP QL NAA+PROBE: NEGATIVE
SARS-COV-2 RNA RESP QL NAA+PROBE: NORMAL
SODIUM SERPL-SCNC: 139 MMOL/L (ref 133–144)
SPECIMEN SOURCE: NORMAL
SPECIMEN SOURCE: NORMAL
WBC # BLD AUTO: 13.5 10E9/L (ref 4–11)

## 2021-06-11 PROCEDURE — 87486 CHLMYD PNEUM DNA AMP PROBE: CPT | Performed by: NURSE PRACTITIONER

## 2021-06-11 PROCEDURE — 71046 X-RAY EXAM CHEST 2 VIEWS: CPT | Mod: GC | Performed by: RADIOLOGY

## 2021-06-11 PROCEDURE — 87581 M.PNEUMON DNA AMP PROBE: CPT | Performed by: NURSE PRACTITIONER

## 2021-06-11 PROCEDURE — U0003 INFECTIOUS AGENT DETECTION BY NUCLEIC ACID (DNA OR RNA); SEVERE ACUTE RESPIRATORY SYNDROME CORONAVIRUS 2 (SARS-COV-2) (CORONAVIRUS DISEASE [COVID-19]), AMPLIFIED PROBE TECHNIQUE, MAKING USE OF HIGH THROUGHPUT TECHNOLOGIES AS DESCRIBED BY CMS-2020-01-R: HCPCS | Performed by: NURSE PRACTITIONER

## 2021-06-11 PROCEDURE — 84100 ASSAY OF PHOSPHORUS: CPT

## 2021-06-11 PROCEDURE — G0463 HOSPITAL OUTPT CLINIC VISIT: HCPCS | Mod: 25

## 2021-06-11 PROCEDURE — G0463 HOSPITAL OUTPT CLINIC VISIT: HCPCS

## 2021-06-11 PROCEDURE — 87633 RESP VIRUS 12-25 TARGETS: CPT | Performed by: NURSE PRACTITIONER

## 2021-06-11 PROCEDURE — 85025 COMPLETE CBC W/AUTO DIFF WBC: CPT

## 2021-06-11 PROCEDURE — 82248 BILIRUBIN DIRECT: CPT

## 2021-06-11 PROCEDURE — 83735 ASSAY OF MAGNESIUM: CPT

## 2021-06-11 PROCEDURE — 99215 OFFICE O/P EST HI 40 MIN: CPT

## 2021-06-11 PROCEDURE — 87040 BLOOD CULTURE FOR BACTERIA: CPT | Performed by: NURSE PRACTITIONER

## 2021-06-11 PROCEDURE — 250N000011 HC RX IP 250 OP 636: Performed by: NURSE PRACTITIONER

## 2021-06-11 PROCEDURE — 96374 THER/PROPH/DIAG INJ IV PUSH: CPT

## 2021-06-11 PROCEDURE — U0005 INFEC AGEN DETEC AMPLI PROBE: HCPCS | Performed by: NURSE PRACTITIONER

## 2021-06-11 PROCEDURE — 83615 LACTATE (LD) (LDH) ENZYME: CPT

## 2021-06-11 PROCEDURE — 250N000009 HC RX 250: Performed by: NURSE PRACTITIONER

## 2021-06-11 PROCEDURE — 80053 COMPREHEN METABOLIC PANEL: CPT

## 2021-06-11 RX ORDER — CEFTRIAXONE SODIUM 2 G
2 VIAL (EA) INJECTION EVERY 24 HOURS
Status: DISCONTINUED | OUTPATIENT
Start: 2021-06-11 | End: 2021-06-11 | Stop reason: HOSPADM

## 2021-06-11 RX ORDER — CEFTRIAXONE SODIUM 2 G
2 VIAL (EA) INJECTION EVERY 24 HOURS
Status: CANCELLED
Start: 2021-06-12

## 2021-06-11 RX ORDER — CEFTRIAXONE SODIUM 2 G
2 VIAL (EA) INJECTION EVERY 24 HOURS
Status: CANCELLED
Start: 2021-06-11

## 2021-06-11 RX ORDER — HEPARIN SODIUM (PORCINE) LOCK FLUSH IV SOLN 100 UNIT/ML 100 UNIT/ML
5 SOLUTION INTRAVENOUS EVERY 8 HOURS PRN
Status: DISCONTINUED | OUTPATIENT
Start: 2021-06-11 | End: 2021-06-11 | Stop reason: HOSPADM

## 2021-06-11 RX ADMIN — CEFTRIAXONE SODIUM 2 G: 2 INJECTION, POWDER, FOR SOLUTION INTRAMUSCULAR; INTRAVENOUS at 12:05

## 2021-06-11 RX ADMIN — Medication 5 ML: at 12:12

## 2021-06-11 ASSESSMENT — PAIN SCALES - GENERAL: PAINLEVEL: MODERATE PAIN (4)

## 2021-06-11 NOTE — PROGRESS NOTES
Patient arrived ambulatory, by self, for port lab draw.  Port accessed under aseptic technique - excellent blood return noted.  Blood drawn for labs.  Port flushed with NS.  Needle secured.

## 2021-06-11 NOTE — PROGRESS NOTES
Pt here for treatment after seeing NP.  Premeds given as directed and Rituxan at 100cc/hr for 30 min then 200cc/hr for remainder.  No problems with infusions.  Upon completion port flushed and deaccessed.  Pt d/c ambulatory to lobby with his wife and is aware of treatment plan

## 2021-06-11 NOTE — LETTER
6/11/2021         RE: Juvenal Blake  [address] [place] [address]        Dear Colleague,    Thank you for referring your patient, Juvenal Blake, to the [facility] [place]. Please see a copy of my visit note below.    BMT Clinic Note     Juvenal Blake is a 55 year old year old male diagnosed with NHL.  He has been treated with Bendamustine + Rituxan then Bendamustine +  Rituxan once again the R-CHOP followed by Nam-NK cell infusion. He is enrolled in protocol [id number], which utilizes Cy/Flu as a lymphodepletion regimen as bridge to [place]carta.    HPI:  Juvenal is feeling bad this morning.  He had a temp to 101 here in clinic this morning with chills.& achiness.  Has chronic cough & SOB, but feels it is a bit worse recently.  Up frequently last night voiding, no burning or bleeding.  Bothered by nausea, but no emesis.  Still having frequent, watery stools, not new.         Review of Systems: 8 point ROS negative except as noted above.    Physical Exam:   Blood pressure 129/57, pulse 97, temperature 100.9  F (38.3  C), temperature source Tympanic, resp. rate 16, weight 109.8 kg (242 lb), SpO2 92 %.  General: NAD   Eyes: : JOE, sclera anicteric   Nose/Mouth/Throat: OP clear, buccal mucosa moist, no ulcerations   Lungs: CTA bilaterally  Cardiovascular: RRR, no M/R/G   Abdominal/Rectal: +BS, soft, NT, ND  Lymphatics: no edema  Skin: no rashes or petechaie  Neuro: A&O   Additional Findings: Jacobs site NT, no drainage.  Labs:  Lab Results   Component Value Date    WBC 13.5 (H) 06/11/2021    ANEU 8.2 06/11/2021    HGB 10.6 (L) 06/11/2021    HCT 34.0 (L) 06/11/2021    PLT 82 (L) 06/11/2021     06/11/2021    POTASSIUM 4.3 06/11/2021    CHLORIDE 102 06/11/2021    CO2 25 06/11/2021     (H) 06/11/2021    BUN 10 06/11/2021    CR 1.15 06/11/2021    MAG 1.8 06/11/2021    INR 0.97 06/04/2021       Assessment and Plan:   1.  Lymphoma:  - He had 6 cycles of  bendamustine and Rituxan completed in October, 2010. He had 2 years of maintenance Rituxan therapy finishing in September, 2012.  - Second course of bendamustine plus rituximab started February 20, 2017. Cycle 6 was completed on July 11, 2017. Then had maintenance rituximab through January, 2019.  - O-CHOP started at second relapse. Cycle 1 given April 23, 2019. Cycle 6 given August 16, 2019.  Haplo NAM-NK therapy at [facility] September 1, 2020  - Iidelalisib started October 21, 2020 at [facility]. 150 mg twice daily.      -  eligible to proceed with therapy with .  stopped Idelalisib - last dose was on 6/10 at 8am. Half life is 8 1/2 hours - x 5 = 42 hours. Plan was to begin LD chemotherapy 6/11 and receive  cells on 6/16, but since he has a new fever today will hold off through the weekend.  LDH trending up so should not postpone chemo too long.    2.  HEME:  No need for transfusions today    3.  Prostate:   Initial radical prostatectomy and bilateral lymph node dissection. November 15, 2017. Prostatic adenocarcinoma Maricel 4+3 = 7 with tertiary pattern 5. Tumor involves 45% of total surface area. Positive for extensive extraprostatic extension. Positive for bilateral seminal vesicle invasion. Multiple margins positive. Lymphovascular invasion not identified. Perineural invasion was noted. Lymph nodes negative. 3 were examined (2 right obturator and 1 left obturator).    Completed radiation to the prostate fossa and pelvic lymph nodes at [facility] early May 2018. He received 4500 cGy in 25 fractions. He then had 2340 cGy boost in 13 fractions to the prostatic fossa, for a total dose of 6240 cGy in 38 treatment fractions delivered over 54 days. He did not receive hormonal therapy:      4.  ID:  Febrile, not neutropenic but scheduled to begin LD chemo today.  Will not give chemo today.  - Send blood cx, RVP & repeat Covid.    - Rocephin 2gm daily through the weekend while awaiting cx results  -  CXR today does not appear changed from a few days ago, but radiology report pending.    5.  FEN:  Lytes & creat stable    RTC sat/sun for lab, provider & Rocephin    Review of the result(s) of each unique test - cbc, cmp, mg, ldh, cxr, covid  40 minutes spent on the date of the encounter doing chart review, history and exam, documentation and further activities per the note        Again, thank you for allowing me to participate in the care of your patient.        Sincerely,        BMT Advanced Practice Provider

## 2021-06-11 NOTE — NURSING NOTE
Chief Complaint   Patient presents with     Port Draw     Labs drawn via PORT by RN in lab. VS taken.      Jodi Thorne RN

## 2021-06-11 NOTE — PROGRESS NOTES
[facility] Hematology and Oncology Progress Note    Patient: Juvenal Blake  MRN: [id number]  Date of Service:         Reason for Visit    Chief Complaint   Patient presents with     HE Cancer       Assessment and Plan    1.  Follicular lymphoma, low-grade.  Patient now has recurrent disease that is causing issues.  He has restarted on treatment with bendamustine and Rituxan.  He is tolerating this chemotherapy well.  His CT scan show significant response we will continue and complete 6 cycles.  He will return in 4 weeks for cycle 5.  We will give cycle 4 today at full dose.  Likely do a PET scan after the sixth cycle.    2.  URI: I will give robitussin with codine and levaquin 500m daily for 7days.    3. 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  The chemo is usually cumulative. Overall asymptomatic. Continue to monitor.  Slowly getting worse        ECOG Performance   ECOG Performance Status: 1    Distress Assessment  Distress Assessment Score: No distress    Pain  Currently in Pain: Yes  Pain Score (Initial OR Reassessment): 3  Location: Bilat Shoulders       Problem List    1. Follicular lymphoma grade i, lymph nodes of multiple sites  CC OFFICE VISIT LONG    Infusion Appointment    CC OFFICE VISIT LONG    Infusion Appointment    DISCONTINUED: fosaprepitant 150 mg in sodium chloride 0.9% 150 mL (EMEND)    DISCONTINUED: famotidine 20 mg/2 mL injection 20 mg (PEPCID)    DISCONTINUED: sodium chloride 0.9% 250 mL infusion    DISCONTINUED: diphenhydrAMINE injection 50 mg (BENADRYL)    DISCONTINUED: acetaminophen tablet 650 mg (TYLENOL)    DISCONTINUED: riTUXimab 850 mg in sodium chloride 0.9% 165 mL chemo (RITUXAN)    DISCONTINUED: bendamustine 210 mg in sodium chloride 0.9% 50 mL chemo (BENDEKA)    DISCONTINUED: sodium chloride 0.9 % flush 20 mL (NS)    DISCONTINUED: heparin 100 unit/mL lockflush (PF) porcine 300-600 Units    DISCONTINUED: diphenhydrAMINE injection 50 mg (BENADRYL)    DISCONTINUED: famotidine 20  mg/2 mL injection 20 mg (PEPCID)    DISCONTINUED: hydrocortisone sod succ (PF) 100 mg/2 mL injection 100 mg    DISCONTINUED: acetaminophen tablet 1,000 mg (TYLENOL)   2. URI with cough and congestion     3. Anemia associated with chemotherapy        ______________________________________________________________________________    History of Present Illness    Diagnosis:     1. Follicular lymphoma: Diagnosed in April, 2010 . Stage IV diagnosis with bone marrow involvement. Initial disease involved the cervical, supraclavicular, axillary, mesenteric mass, retroperitoneal nodes, and possibly the manubrium.    Current therapy:   1. Patient restarted bendamustine and Rituxan on February 20, 2017.  Today is cycle 5 day 1     Past treatment:     1. He had 6 cycles of bendamustine and Rituxan completed in October, 2010. He had 2 years of maintenance Rituxan therapy finishing in September, 2012.     Interim History:  Juvenal returns to the clinic today for cycle 5 of chemo. He says that the chemotherapy is overall going pretty well.  He has mild fatigue but does continue to work.  Mild swelling in his legs that is pretty stable throughout the day.  Has been strugling with a URI that has symptoms persisting for a couple of weeks. Did improve on augmentin, but then got worse when done. Cough is persistant, yellow phlegm. Mild low grade fevers. No chills or rigors.     Pain Status  Currently in Pain: Yes    Review of Systems    Constitutional  Constitutional (WDL): Exceptions to WDL  Fatigue: Fatigue relieved by rest  Neurosensory  Neurosensory (WDL): All neurosensory elements are within defined limits  Eye   Eye Disorder (WDL): All eye disorder elements are within defined limits  Ear  Ear Disorder (WDL): All ear disorder elements are within defined limits  Cardiovascular  Cardiovascular (WDL): Exceptions to WDL  Edema: Yes  Edema Limbs: 5 - 10% inter-limb discrepancy in volume or circumference at point of greatest visible  difference, swelling or obscuration of anatomic architecture on close inspection (mild swelling)  Pulmonary  Respiratory (WDL): Exceptions to WDL (Sinus and Chest cold)  Cough: Mild symptoms, nonprescription intervention indicated (improving)  Dyspnea: Shortness of breath with moderate exertion  Gastrointestinal  Gastrointestinal (WDL): Exceptions to WDL  Dysgeusia: Altered taste but no change in diet  Genitourinary  Genitourinary (WDL): All genitourinary elements are within defined limits  Musculoskeletal and Connective Tissue  Musculoskeletal and Connetive Tissue Disorders (WDL): Exceptions to WDL  Arthralgia: Mild pain (hands)  Integumentary  Integumentary (WDL): All integumentary elements are within defined limits  Patient Coping  Patient Coping: Accepting  Distress Assessment  Distress Assessment Score: No distress  Accompanied by  Accompanied by: Family Member    Past History  Past Medical History:   Diagnosis Date     Cancer      Non Hodgkin's lymphoma        Physical Exam    Recent Vitals 6/12/2017   Weight 239 lbs 8 oz   /83   Pulse 72   Temp 98.6   Temp src 1   SpO2 99       General: alert, appears stated age and cooperative, here with wife  HEENT: Head: Normocephalic, no lesions, without obvious abnormality.  Pharynx: Dental Hygiene adequate. Normal buccal mucosa. Normal pharynx.  Chest: Normal chest wall and respirations. Clear to auscultation.  Cardiac: regular rate and rhythm, S1, S2 normal, no murmur, click, rub or gallop  Abdomen: abdomen is soft without significant tenderness, masses, organomegaly or guarding  Extremities: normal strength, tone, and muscle mass, patient does have mild swelling bilaterally in his legs.  He does have compression stockings on.  Skin: normal, port is CDI  CNS: normal without focal findings and mental status, speech normal, alert and oriented x3  Lymphatics: No abnormally enlarged lymph nodes.    Lab Results    Recent Results (from the past 168 hour(s))    Comprehensive Metabolic Panel   Result Value Ref Range    Sodium 142 136 - 145 mmol/L    Potassium 4.1 3.5 - 5.0 mmol/L    Chloride 107 98 - 107 mmol/L    CO2 28 22 - 31 mmol/L    Anion Gap, Calculation 7 5 - 18 mmol/L    Glucose 127 (H) 70 - 125 mg/dL    BUN 11 8 - 22 mg/dL    Creatinine 0.93 0.70 - 1.30 mg/dL    GFR MDRD Af Amer >60 >60 mL/min/1.73m2    GFR MDRD Non Af Amer >60 >60 mL/min/1.73m2    Bilirubin, Total 0.3 0.0 - 1.0 mg/dL    Calcium 9.4 8.5 - 10.5 mg/dL    Protein, Total 6.2 6.0 - 8.0 g/dL    Albumin 3.8 3.5 - 5.0 g/dL    Alkaline Phosphatase 59 45 - 120 U/L    AST 17 0 - 40 U/L    ALT 24 0 - 45 U/L   HM1 (CBC with Diff)   Result Value Ref Range    WBC 4.0 4.0 - 11.0 thou/uL    RBC 4.21 (L) 4.40 - 6.20 mill/uL    Hemoglobin 11.1 (L) 14.0 - 18.0 g/dL    Hematocrit 35.0 (L) 40.0 - 54.0 %    MCV 83 80 - 100 fL    MCH 26.4 (L) 27.0 - 34.0 pg    MCHC 31.7 (L) 32.0 - 36.0 g/dL    RDW 15.9 (H) 11.0 - 14.5 %    Platelets 168 140 - 440 thou/uL    MPV 9.2 8.5 - 12.5 fL    Neutrophils % 71 (H) 50 - 70 %    Lymphocytes % 12 (L) 20 - 40 %    Monocytes % 13 (H) 2 - 10 %    Eosinophils % 4 0 - 6 %    Basophils % 1 0 - 2 %    Neutrophils Absolute 2.8 2.0 - 7.7 thou/uL    Lymphocytes Absolute 0.5 (L) 0.8 - 4.4 thou/uL    Monocytes Absolute 0.5 0.0 - 0.9 thou/uL    Eosinophils Absolute 0.2 0.0 - 0.4 thou/uL    Basophils Absolute 0.0 0.0 - 0.2 thou/uL       Imaging    No results found.      Signed by: Madhavi Kurtz, MARIA GUADALUPE

## 2021-06-11 NOTE — PROGRESS NOTES
Patient arrived ambulatory, accompanied by wife, after seeing NP.  IV of NS initiated for treatment.  Given premeds IVP/PO/IVPB as ordered.  Port flushed with NS between drugs.  Given  Rituxan IVPB over 90 minutes at rates of 100cc/hr for 30 minutes and 200cc/hr for 1 hour.  Port flushed with NS.  Given Bendamustine IVPB over 10 minutes.  Port flushed with NS.  Heparin instilled and needle dced.  Dressing applied.  Patient left ambulatory, with wife, in stable condition.  Instructed to call with questions/concerns/problems.  Patient verbalized understanding.  Returns tomorrow for day 2 of cycle.

## 2021-06-11 NOTE — PROGRESS NOTES
[facility] Hematology and Oncology Progress Note    Patient: Juvenal Blake  MRN: [id number]  Date of Service:         Reason for Visit    Chief Complaint   Patient presents with     HE Cancer     Malignant Hematology       Assessment and Plan    1.  Follicular lymphoma, low-grade.  Patient now has recurrent disease that is causing issues.  He has restarted on treatment with bendamustine and Rituxan.  He is tolerating this chemotherapy well.  His CT scan show significant response we will continue and complete 6 cycles. He will get his sixth cycle today. We will get a PET scan after this cycle and then have him see Dr. Crooks in one month. He may have the pt do 2 years of maintenance rituxan at that point.     2. Mild leukopenia: chemo induced. Educated on precautions to take. No changes at this time on chemo, should recover after chemo is done.     3. 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  The chemo is usually cumulative. Overall asymptomatic. Continue to monitor.  Slowly getting worse        ECOG Performance   ECOG Performance Status: 1    Distress Assessment  Distress Assessment Score: No distress    Pain  Currently in Pain: Yes  Pain Score (Initial OR Reassessment): 6  Location: bilateral shoulders: chronic. We are not managing.       Problem List    1. Follicular lymphoma grade i, lymph nodes of multiple sites  CC OFFICE VISIT LONG    Infusion Appointment    NM PET CT Skull to Mid Thigh    HM1(CBC and Differential)    Comprehensive Metabolic Panel    DISCONTINUED: dexamethasone 20 mg in sodium chloride 0.9% 50 mL (DECADRON)    DISCONTINUED: fosaprepitant 150 mg in sodium chloride 0.9% 150 mL (EMEND)    DISCONTINUED: famotidine 20 mg/2 mL injection 20 mg (PEPCID)    DISCONTINUED: sodium chloride 0.9% 250 mL infusion    DISCONTINUED: diphenhydrAMINE injection 50 mg (BENADRYL)    DISCONTINUED: acetaminophen tablet 650 mg (TYLENOL)    DISCONTINUED: riTUXimab 850 mg in sodium chloride 0.9% 165 mL chemo  (RITUXAN)    DISCONTINUED: bendamustine 210 mg in sodium chloride 0.9% 50 mL chemo (BENDEKA)    DISCONTINUED: diphenhydrAMINE injection 50 mg (BENADRYL)    DISCONTINUED: famotidine 20 mg/2 mL injection 20 mg (PEPCID)    DISCONTINUED: hydrocortisone sod succ (PF) 100 mg/2 mL injection 100 mg    DISCONTINUED: acetaminophen tablet 1,000 mg (TYLENOL)      ______________________________________________________________________________    History of Present Illness    Diagnosis:     1. Follicular lymphoma: Diagnosed in April, 2010 . Stage IV diagnosis with bone marrow involvement. Initial disease involved the cervical, supraclavicular, axillary, mesenteric mass, retroperitoneal nodes, and possibly the manubrium.    Current therapy:   1. Patient restarted bendamustine and Rituxan on February 20, 2017.  Today is cycle 6 day 1     Past treatment:     1. He had 6 cycles of bendamustine and Rituxan completed in October, 2010. He had 2 years of maintenance Rituxan therapy finishing in September, 2012.     Interim History:  Juvenal returns to the clinic today for cycle 6 of chemo. He says that the chemotherapy is overall going pretty well.  He has mild fatigue but does continue to work.  Mild swelling in his legs that is pretty stable throughout the day.  No infectious complaints    Pain Status  Currently in Pain: Yes    Review of Systems    Constitutional  Constitutional (WDL): Exceptions to WDL  Fatigue: Fatigue relieved by rest  Neurosensory  Neurosensory (WDL): All neurosensory elements are within defined limits  Eye   Eye Disorder (WDL): Exceptions to WDL  Blurred Vision: Intervention not indicated (floaters for past couple months)  Ear  Ear Disorder (WDL): All ear disorder elements are within defined limits  Cardiovascular  Cardiovascular (WDL): All cardiovascular elements are within defined limits  Edema: No  Pulmonary  Respiratory (WDL): Exceptions to WDL  Cough: Mild symptoms, nonprescription intervention  "indicated  Dyspnea: Shortness of breath with moderate exertion  Gastrointestinal  Gastrointestinal (WDL): Exceptions to WDL  Constipation: Occasional or intermittent symptoms, occasional use of stool softeners, laxatives, dietary modification, or enema (metamucil)  Genitourinary  Genitourinary (WDL): All genitourinary elements are within defined limits  Musculoskeletal and Connective Tissue  Musculoskeletal and Connetive Tissue Disorders (WDL): Exceptions to WDL  Arthralgia: Mild pain (shoulders)  Bone Pain: Mild pain (shoulders)  Integumentary  Integumentary (WDL): All integumentary elements are within defined limits  Patient Coping  Patient Coping: Accepting  Distress Assessment  Distress Assessment Score: No distress  Accompanied by  Accompanied by: Family Member    Past History  Past Medical History:   Diagnosis Date     Cancer      Non Hodgkin's lymphoma        Physical Exam    Recent Vitals 7/10/2017   Height 5' 8\"   Weight 246 lbs   BSA (m2) 2.31 m2   /83   Pulse 67   Temp 98   Temp src 1   SpO2 97       General: alert, appears stated age and cooperative, here with wife  HEENT: Head: Normocephalic, no lesions, without obvious abnormality.  Pharynx: Dental Hygiene adequate. Normal buccal mucosa. Normal pharynx.  Chest: Normal chest wall and respirations. Clear to auscultation.  Cardiac: regular rate and rhythm, S1, S2 normal, no murmur, click, rub or gallop  Abdomen: abdomen is soft without significant tenderness, masses, organomegaly or guarding  Extremities: normal strength, tone, and muscle mass, patient does have mild swelling bilaterally in his legs.   Skin: normal, port is CDI  CNS: normal without focal findings and mental status, speech normal, alert and oriented x3  Lymphatics: No abnormally enlarged lymph nodes.    Lab Results    Recent Results (from the past 168 hour(s))   Comprehensive Metabolic Panel   Result Value Ref Range    Sodium 139 136 - 145 mmol/L    Potassium 4.2 3.5 - 5.0 mmol/L    " Chloride 107 98 - 107 mmol/L    CO2 25 22 - 31 mmol/L    Anion Gap, Calculation 7 5 - 18 mmol/L    Glucose 154 (H) 70 - 125 mg/dL    BUN 15 8 - 22 mg/dL    Creatinine 1.04 0.70 - 1.30 mg/dL    GFR MDRD Af Amer >60 >60 mL/min/1.73m2    GFR MDRD Non Af Amer >60 >60 mL/min/1.73m2    Bilirubin, Total 0.4 0.0 - 1.0 mg/dL    Calcium 9.5 8.5 - 10.5 mg/dL    Protein, Total 6.2 6.0 - 8.0 g/dL    Albumin 3.7 3.5 - 5.0 g/dL    Alkaline Phosphatase 69 45 - 120 U/L    AST 26 0 - 40 U/L    ALT 38 0 - 45 U/L   LD(LDH)   Result Value Ref Range    LD (LDH) 211 125 - 220 U/L   HM1 (CBC with Diff)   Result Value Ref Range    WBC 3.0 (L) 4.0 - 11.0 thou/uL    RBC 4.07 (L) 4.40 - 6.20 mill/uL    Hemoglobin 11.1 (L) 14.0 - 18.0 g/dL    Hematocrit 34.1 (L) 40.0 - 54.0 %    MCV 84 80 - 100 fL    MCH 27.3 27.0 - 34.0 pg    MCHC 32.6 32.0 - 36.0 g/dL    RDW 15.7 (H) 11.0 - 14.5 %    Platelets 145 140 - 440 thou/uL    MPV 9.1 8.5 - 12.5 fL    Neutrophils % 67 50 - 70 %    Lymphocytes % 11 (L) 20 - 40 %    Monocytes % 17 (H) 2 - 10 %    Eosinophils % 4 0 - 6 %    Basophils % 1 0 - 2 %    Neutrophils Absolute 2.0 2.0 - 7.7 thou/uL    Lymphocytes Absolute 0.3 (L) 0.8 - 4.4 thou/uL    Monocytes Absolute 0.5 0.0 - 0.9 thou/uL    Eosinophils Absolute 0.1 0.0 - 0.4 thou/uL    Basophils Absolute 0.0 0.0 - 0.2 thou/uL       Imaging    No results found.      Signed by: Madhavi Kurtz, CNP

## 2021-06-11 NOTE — PROGRESS NOTES
Infusion Nursing Note:  Juvenal Blake presents today for   Chief Complaint   Patient presents with     Infusion     Patient here for antibiotics as chemo was canceled due to fever.      .    Patient seen by provider today: Yes: Catalina Paris   present during visit today: Not Applicable.    Note: Patient received IV push antibiotics. Chemo was canceled today due to fever upon check in. Blood cultures drawn and Covid and Respiratory panel completed. Patient educated to watch fever tonight and call on call fellow with any worsening symptoms. BMT office aware that LD chemo/PICC placement will have to be rescheudled.     Patient Did criteria for an symptomatic covid-19 PCR test in infusion today.     Intravenous Access:  Implanted Port. Left accessed per patient request for tomorrows infusion appointment.     Treatment Conditions:  Not Applicable.      Post Infusion Assessment:  Patient tolerated infusion without incident.       Discharge Plan:   Discharge instructions reviewed with: Patient and Family.  Patient and/or family verbalized understanding of discharge instructions and all questions answered.  AVS to patient via [employer].  Patient will return tomorrow for next appointment.   Patient discharged in stable condition accompanied by: self and wife.  Departure Mode: Ambulatory.      Tierra Hamilton RN

## 2021-06-11 NOTE — PROGRESS NOTES
[facility] paperwork for wife Nancy has been filled out, signed and faxed to Abbey Crabtree at [phone].  Will leave copy at  for Nancy to  on Monday 7/10/17.    Estrella Vargas RN 7/7/17 0072

## 2021-06-11 NOTE — LETTER
6/11/2021         RE: Juvenal Blake  [address] [place] [address]        Dear Colleague,    Thank you for referring your patient, Juvenal Blake, to the [facility] [place]. Please see a copy of my visit note below.    Infusion Nursing Note:  Juveanl Blake presents today for   Chief Complaint   Patient presents with     Infusion     Patient here for antibiotics as chemo was canceled due to fever.      .    Patient seen by provider today: Yes: Catalina Paris   present during visit today: Not Applicable.    Note: Patient received IV push antibiotics. Chemo was canceled today due to fever upon check in. Blood cultures drawn and Covid and Respiratory panel completed. Patient educated to watch fever tonight and call on call fellow with any worsening symptoms. BMT office aware that LD chemo/PICC placement will have to be rescheudled.     Patient Did criteria for an symptomatic covid-19 PCR test in infusion today.     Intravenous Access:  Implanted Port. Left accessed per patient request for tomorrows infusion appointment.     Treatment Conditions:  Not Applicable.      Post Infusion Assessment:  Patient tolerated infusion without incident.       Discharge Plan:   Discharge instructions reviewed with: Patient and Family.  Patient and/or family verbalized understanding of discharge instructions and all questions answered.  AVS to patient via [employer].  Patient will return tomorrow for next appointment.   Patient discharged in stable condition accompanied by: self and wife.  Departure Mode: Ambulatory.      Tierra Hamilton RN                          Again, thank you for allowing me to participate in the care of your patient.        Sincerely,        [facility]

## 2021-06-11 NOTE — PROGRESS NOTES
Pt here for day 2 txt. PT states doing well today , a little fatigue and mild constipation. Port accessed sterile technique with brisk blood return. Txt reviewed with pt and administered as ordered. Tubing flushed with ns upon completion of txt. Port flushed with heparin/deaccessed with 2x2 to site. Follow up reviewed and pt dc'd steady gait.

## 2021-06-11 NOTE — NURSING NOTE
"Oncology Rooming Note    June 11, 2021 12:41 PM   Juvenal Blake is a 55 year old male who presents for:    Chief Complaint   Patient presents with     Port Draw     Labs drawn via PORT by RN in lab. VS taken.      RECHECK     Patient here for provider visit r/t lymphoma pre transplant.      Initial Vitals: /57 (BP Location: Right arm, Patient Position: Sitting, Cuff Size: Adult Regular)   Pulse 97   Temp 100.9  F (38.3  C) (Tympanic)   Resp 16   Wt 109.8 kg (242 lb)   SpO2 92%   BMI 36.26 kg/m   Estimated body mass index is 36.26 kg/m  as calculated from the following:    Height as of 6/4/21: 1.74 m (5' 8.5\").    Weight as of this encounter: 109.8 kg (242 lb). Body surface area is 2.3 meters squared.  Moderate Pain (4) Comment: Data Unavailable   No LMP for male patient.  Allergies reviewed: Yes  Medications reviewed: Yes    Medications: Medication refills not needed today.  Pharmacy name entered into [employer]: [id number] - [place], [place] - [address] AT [facility]    Clinical concerns: Patient had fever in lab.  Catalina Paris was notified.      Tierra Hamilton RN              "

## 2021-06-11 NOTE — PROGRESS NOTES
"History:     Reason for follow up:   1. Stage IV, T3N0M1 cholangiocarcinoma.   2. Hospitalization 3/4-3/7/17 with Klebsiella UTI, dehydration, pancytopenia.       HPI:  Charlee Barrera presents for follow-up of cholangiocarcinoma on palliative Gemzar, accompanied by her daughter.  She was just recently hospitalized as noted above.  He is not experiencing significant lower extremity swelling, which she states was a problem in the past when she had a UTI.  She is also been retaining some fluid while on Gemzar.  The patient currently takes Norvasc for blood pressure, though her blood pressure has typically been running lower.  She is not currently on any diuretic.    Unfortunately because of her lower shortly swelling, her daughter tried to put support hose on and accidentally cut her leg with her ring.  The patient has a healing wound on her left lower shin.  Patient also has a healing skin tear on her left arm that occurred during hospitalization.    He completed antibiotic therapy for recent UTI this past Sunday, 3/5/17.  She denies any further urinary symptoms.  She also reports normal bowel function.    Her daughter also notes that her blood sugars have been running anywhere from 70 to 91st thing in the morning.  She is currently on Kombiglyze 2.5-1000.  Her daughter is asking about holding this.  The patient is reportedly eating less than usual. They deny other concerns at this time.    Past Medical History   Diagnosis Date   • Anxiety    • Arthritis      \"bad right knee\"   • Bile duct cancer      Metastatic cholangiocarcinoma    • Broken arm      LEFT   • Cancer    • Diabetes mellitus    • Diarrhea due to drug    • History of transfusion    • Hypertension    • PONV (postoperative nausea and vomiting)     and   Patient Active Problem List   Diagnosis   • Cholangiocarcinoma   • Hyperbilirubinemia   • Type 2 diabetes mellitus without complication   • Hyperglycemia   • Cancer related pain   • Fever and chills   • " Patient arrived ambulatory, accompanied by wife for day 2 of treatment.  Port accessed under aseptic technique - excellent blood return noted.  IV of NS initiated for treatment.  Given premed IVP and Chemo IVP as ordered.  Port flushed with NS between drugs and at completion.  Heparin instilled in port and needle dced.  Dressing applied.  Patient left ambulatory, accompanied by wife, in stable condition.  Instructed to call with questions/concerns/problems.  Patient verbalized understanding.   Chemotherapy-induced thrombocytopenia   • Anemia associated with chemotherapy   • Hypomagnesemia   • Hypokalemia   • Closed fracture of left olecranon process   • Wrist swelling   • Closed fracture of humerus   • Encounter for adjustment or management of vascular access device   • Bile duct obstruction   • Neutropenic fever   • Weakness   • Generalized weakness   • Emesis, persistent   • Acute UTI     Social History   Social History     Social History   • Marital status:      Spouse name: Carlitos   • Number of children: 4   • Years of education: High School     Occupational History   • Housewife Retired     Clothing  for department store     Social History Main Topics   • Smoking status: Never Smoker   • Smokeless tobacco: Never Used   • Alcohol use 1.2 - 1.8 oz/week     2 - 3 Glasses of wine per week      Comment: weekly   • Drug use: No   • Sexual activity: Not Currently     Other Topics Concern   • Not on file     Social History Narrative    Patient has never smoked. Reports she drinks about 1.2 0 1.8 oz of alcohol per week.      Family History  Family History   Problem Relation Age of Onset   • Heart disease Father    • Hypertension Mother      Allergies  Allergies   Allergen Reactions   • Morphine And Related Confusion   • Sulfa Antibiotics      Patient cannot remember reaction.   • Oxaliplatin Palpitations, Other (See Comments) and Angioedema     Severe flushing for head, neck and palms.  Requiring iv benadryl, iv pepcid and iv steroids (solucortef) to reverse       Medications: The current medication list was reviewed in the EMR.    Review of Systems  Review of Systems   Constitutional: Positive for appetite change and fatigue. Negative for chills, diaphoresis and fever. Activity change: early satiety.   HENT: Negative for congestion, ear pain, sinus pressure, tinnitus and trouble swallowing.    Eyes: Negative for discharge, redness, itching and visual disturbance.   Respiratory: Negative for cough,  "chest tightness and shortness of breath.    Cardiovascular: Positive for leg swelling (chronic; much worse today - see HPI). Negative for chest pain and palpitations.   Gastrointestinal: Negative for abdominal distention, abdominal pain, blood in stool, diarrhea, nausea and vomiting.   Endocrine: Negative for cold intolerance, polydipsia and polyuria.   Genitourinary: Negative for difficulty urinating, dysuria and pelvic pain.   Musculoskeletal: Negative for joint swelling and myalgias.   Skin: Negative for color change, pallor, rash and wound.        Skin tear on left arm and left lower leg; see HPI   Neurological: Negative for dizziness, seizures and weakness.   Hematological: Negative for adenopathy. Does not bruise/bleed easily.   Psychiatric/Behavioral: Negative for agitation and behavioral problems.       Objective:     Vitals:    03/14/17 0832   BP: 112/58   Pulse: 89   Resp: 16   Temp: 97.5 °F (36.4 °C)   TempSrc: Oral   SpO2: 98%   Weight: 134 lb 12.8 oz (61.1 kg)   Height: 62\" (157.5 cm)   PainSc: 0-No pain     Current Status 3/14/2017   ECOG score 1   Physical Exam   Constitutional: She is oriented to person, place, and time. No distress.   Thin, elderly   HENT:   Head: Normocephalic and atraumatic.   Nose: Nose normal.   Mouth/Throat: Oropharynx is clear and moist. No oropharyngeal exudate.   Eyes: Conjunctivae and EOM are normal. No scleral icterus.   Neck: Normal range of motion. Neck supple. No thyromegaly present.   Cardiovascular: Normal rate, regular rhythm and normal heart sounds.  Exam reveals no friction rub.    No murmur heard.  Pulmonary/Chest: Effort normal and breath sounds normal. No respiratory distress. She has no wheezes. She has no rales.   Port in place and accessed   Abdominal: Soft. Bowel sounds are normal. She exhibits no distension and no mass. There is no tenderness.   Musculoskeletal: Normal range of motion. She exhibits edema (2+ bilateral lower extremity; healing skin tear left " shin/calf). She exhibits no tenderness.   Ambulates with walker   Lymphadenopathy:     She has no cervical adenopathy.   Neurological: She is alert and oriented to person, place, and time.   Skin: Skin is warm and dry. No rash noted. She is not diaphoretic. No erythema.   Psychiatric: Mood, memory, affect and judgment normal.   Vitals reviewed.    Labs and Imaging  Results for orders placed or performed in visit on 03/14/17   Magnesium   Result Value Ref Range    Magnesium 1.5 (C) 1.8 - 2.5 mg/dL     Lab Results   Component Value Date    WBC 6.45 03/14/2017    HGB 10.0 (L) 03/14/2017    HCT 30.9 (L) 03/14/2017    .8 (H) 03/14/2017     (L) 03/14/2017     Lab Results   Component Value Date    NEUTROABS 4.78 03/14/2017       Chemistry        Component Value Date/Time     03/14/2017 0801     (L) 03/02/2016 0957    K 4.3 03/14/2017 0801    K 3.6 03/02/2016 0957     03/14/2017 0801    CL 88 (L) 03/02/2016 0957    CO2 23.1 03/14/2017 0801    CO2 29 03/02/2016 0957    BUN 14 03/14/2017 0801    BUN 18 03/02/2016 0957    CREATININE 0.58 (L) 03/14/2017 0801    CREATININE 0.50 (L) 07/29/2016 0831    CREATININE 0.88 03/02/2016 0957        Component Value Date/Time    CALCIUM 8.2 (L) 03/14/2017 0801    CALCIUM 9.2 03/02/2016 0957    ALKPHOS 203 (H) 03/14/2017 0801    ALKPHOS 323 (H) 02/18/2016 0525    AST 30 03/14/2017 0801    AST 39 (H) 02/18/2016 0525    ALT 15 03/14/2017 0801    ALT 30 02/18/2016 0525    BILITOT 1.6 (H) 03/14/2017 0801    BILITOT 4.3 (H) 02/18/2016 0525          Assessment/Plan   Assessment/Plan:   1. Stage IV, T3N0M1 extrahepatic cholangiocarcinoma.    - palliative [place]/Ox q 2 weeks started 3/14/16. Had to discontinue due to reaction to Oxaliplatin. Also received FOLFIRI, but had intolerable side effects.    - Currently on palliative Gemzar; will have to adjust chemo to every other week and its already dose reduced for liver function. She understands that goal is palliative and  doubtful we're getting shrinkage of the tumor with such a low dose of Gemzar, but we may at least be slowing the disease process and she's feeling well thus we'll continue.    - Proceed with treatment today; repeat imaging next week.   - See Dr. Childs in 2 weeks for MD cortes/sameera and review of scans.    2. Nausea and vomiting. Improved with treatment for gastric ulcers. Monitor. Not an issue today.  3. Biliary obstruction. Improved status post ERCP (early August) with removal of calculi and debris with dramatic improvement in symptoms. Monitor.   4. Cancer related pain. Currently in no pain and not requiring medication.   - Norco 5/325 mg every 4 hours prn.   5. Hyponatremia. Likely related to tumor/po intake. Improved. Continue to monitor. She remains asymptomatic.  We have encouraged a salt rich diet.  6. Thrombocytopenia due to chemotherapy.  Adjusting as above.  7. Klebsiella UTI, hospitalized 3/4-3/7/17.  She has now completed a course of antibiotics.  8. Worsened lower extremity swelling.  This seems to be a combination of chronic lower summary swelling now is estimated by recent hospitalization and infection.  I have prescribed for her Lasix 20 mg and she was instructed in the presence of her daughter to take just 2 doses of this and to monitor for appropriate diuresis.  I will discontinue her Norvasc as outlined below.  9.  Lower blood pressure and continued weight loss.  The patient is on Norvasc and at this point I feel like this may be contributing to her lower extremity edema and is not necessary.  Therefore she has been instructed to discontinue this.  10. Low blood sugars upon waking, ranging 70s-90s. She is on Kombiglyze 2.5-1000. She has been instructed to hold this as well, especially in light of her decreased oral intake and weight loss.  11.  Hypomagnesemia.  The patient is taking Slow-Mag 2 tabs twice daily.  Her magnesium level today is 1.5 however.  We will give her 2 g of IV magnesium.  She has been  requiring frequent IV supplementation.    Total of 25 minutes of time spent with patient. I addressed/managed much more than just administration of chemotherapy today, as outlined above.

## 2021-06-12 ENCOUNTER — RECORDS - HEALTHEAST (OUTPATIENT)
Dept: ADMINISTRATIVE | Facility: OTHER | Age: 56
End: 2021-06-12

## 2021-06-12 ENCOUNTER — INFUSION THERAPY VISIT (OUTPATIENT)
Dept: TRANSPLANT | Facility: CLINIC | Age: 56
End: 2021-06-12
Attending: INTERNAL MEDICINE
Payer: COMMERCIAL

## 2021-06-12 ENCOUNTER — APPOINTMENT (OUTPATIENT)
Dept: LAB | Facility: CLINIC | Age: 56
End: 2021-06-12
Attending: INTERNAL MEDICINE
Payer: COMMERCIAL

## 2021-06-12 VITALS
DIASTOLIC BLOOD PRESSURE: 72 MMHG | WEIGHT: 240 LBS | OXYGEN SATURATION: 99 % | HEART RATE: 85 BPM | SYSTOLIC BLOOD PRESSURE: 124 MMHG | RESPIRATION RATE: 20 BRPM | BODY MASS INDEX: 35.96 KG/M2 | TEMPERATURE: 99.6 F

## 2021-06-12 DIAGNOSIS — C82.00 FOLLICULAR LYMPHOMA GRADE I, UNSPECIFIED BODY REGION (H): Primary | ICD-10-CM

## 2021-06-12 DIAGNOSIS — C82.08 FOLLICULAR LYMPHOMA GRADE I, LYMPH NODES OF MULTIPLE SITES (H): ICD-10-CM

## 2021-06-12 DIAGNOSIS — R11.0 NAUSEA: Primary | ICD-10-CM

## 2021-06-12 LAB
ANION GAP SERPL CALCULATED.3IONS-SCNC: 8 MMOL/L (ref 3–14)
ANISOCYTOSIS BLD QL SMEAR: SLIGHT
BASOPHILS # BLD AUTO: 0 10E9/L (ref 0–0.2)
BASOPHILS NFR BLD AUTO: 0 %
BUN SERPL-MCNC: 13 MG/DL (ref 7–30)
CALCIUM SERPL-MCNC: 9.2 MG/DL (ref 8.5–10.1)
CHLORIDE SERPL-SCNC: 102 MMOL/L (ref 94–109)
CO2 SERPL-SCNC: 26 MMOL/L (ref 20–32)
CREAT SERPL-MCNC: 1.48 MG/DL (ref 0.66–1.25)
DACRYOCYTES BLD QL SMEAR: SLIGHT
DIFFERENTIAL METHOD BLD: ABNORMAL
EOSINOPHIL # BLD AUTO: 0.1 10E9/L (ref 0–0.7)
EOSINOPHIL NFR BLD AUTO: 0.9 %
ERYTHROCYTE [DISTWIDTH] IN BLOOD BY AUTOMATED COUNT: 16 % (ref 10–15)
GFR SERPL CREATININE-BSD FRML MDRD: 52 ML/MIN/{1.73_M2}
GLUCOSE SERPL-MCNC: 133 MG/DL (ref 70–99)
HCT VFR BLD AUTO: 31.6 % (ref 40–53)
HGB BLD-MCNC: 10.2 G/DL (ref 13.3–17.7)
LDH SERPL L TO P-CCNC: 268 U/L (ref 85–227)
LYMPHOCYTES # BLD AUTO: 6.4 10E9/L (ref 0.8–5.3)
LYMPHOCYTES NFR BLD AUTO: 39 %
MAGNESIUM SERPL-MCNC: 1.7 MG/DL (ref 1.6–2.3)
MCH RBC QN AUTO: 28.7 PG (ref 26.5–33)
MCHC RBC AUTO-ENTMCNC: 32.3 G/DL (ref 31.5–36.5)
MCV RBC AUTO: 89 FL (ref 78–100)
MONOCYTES # BLD AUTO: 1 10E9/L (ref 0–1.3)
MONOCYTES NFR BLD AUTO: 5.9 %
NEUTROPHILS # BLD AUTO: 8.9 10E9/L (ref 1.6–8.3)
NEUTROPHILS NFR BLD AUTO: 54.2 %
PLATELET # BLD AUTO: 86 10E9/L (ref 150–450)
PLATELET # BLD EST: ABNORMAL 10*3/UL
POIKILOCYTOSIS BLD QL SMEAR: SLIGHT
POLYCHROMASIA BLD QL SMEAR: SLIGHT
POTASSIUM SERPL-SCNC: 4.3 MMOL/L (ref 3.4–5.3)
RBC # BLD AUTO: 3.55 10E12/L (ref 4.4–5.9)
SODIUM SERPL-SCNC: 136 MMOL/L (ref 133–144)
URATE SERPL-MCNC: 3.7 MG/DL (ref 3.5–7.2)
WBC # BLD AUTO: 16.4 10E9/L (ref 4–11)

## 2021-06-12 PROCEDURE — 83735 ASSAY OF MAGNESIUM: CPT | Performed by: NURSE PRACTITIONER

## 2021-06-12 PROCEDURE — G0463 HOSPITAL OUTPT CLINIC VISIT: HCPCS | Mod: 25

## 2021-06-12 PROCEDURE — 85025 COMPLETE CBC W/AUTO DIFF WBC: CPT | Performed by: NURSE PRACTITIONER

## 2021-06-12 PROCEDURE — 99214 OFFICE O/P EST MOD 30 MIN: CPT

## 2021-06-12 PROCEDURE — 250N000011 HC RX IP 250 OP 636: Performed by: NURSE PRACTITIONER

## 2021-06-12 PROCEDURE — 258N000003 HC RX IP 258 OP 636: Performed by: INTERNAL MEDICINE

## 2021-06-12 PROCEDURE — 250N000011 HC RX IP 250 OP 636: Performed by: INTERNAL MEDICINE

## 2021-06-12 PROCEDURE — 999N000104 HC STATISTIC NO CHARGE

## 2021-06-12 PROCEDURE — 83615 LACTATE (LD) (LDH) ENZYME: CPT | Performed by: NURSE PRACTITIONER

## 2021-06-12 PROCEDURE — 250N000009 HC RX 250: Performed by: NURSE PRACTITIONER

## 2021-06-12 PROCEDURE — 96361 HYDRATE IV INFUSION ADD-ON: CPT

## 2021-06-12 PROCEDURE — 96374 THER/PROPH/DIAG INJ IV PUSH: CPT

## 2021-06-12 PROCEDURE — 84550 ASSAY OF BLOOD/URIC ACID: CPT | Performed by: NURSE PRACTITIONER

## 2021-06-12 PROCEDURE — 80048 BASIC METABOLIC PNL TOTAL CA: CPT | Performed by: NURSE PRACTITIONER

## 2021-06-12 PROCEDURE — 87040 BLOOD CULTURE FOR BACTERIA: CPT | Performed by: NURSE PRACTITIONER

## 2021-06-12 RX ORDER — CEFTRIAXONE SODIUM 2 G
2 VIAL (EA) INJECTION EVERY 24 HOURS
Status: DISCONTINUED | OUTPATIENT
Start: 2021-06-12 | End: 2021-06-12 | Stop reason: HOSPADM

## 2021-06-12 RX ORDER — PROCHLORPERAZINE MALEATE 5 MG
5 TABLET ORAL EVERY 6 HOURS PRN
Qty: 60 TABLET | Refills: 1 | Status: SHIPPED | OUTPATIENT
Start: 2021-06-12 | End: 2021-07-08

## 2021-06-12 RX ORDER — CEFTRIAXONE SODIUM 2 G
2 VIAL (EA) INJECTION EVERY 24 HOURS
Status: CANCELLED
Start: 2021-06-13

## 2021-06-12 RX ORDER — HEPARIN SODIUM (PORCINE) LOCK FLUSH IV SOLN 100 UNIT/ML 100 UNIT/ML
5 SOLUTION INTRAVENOUS ONCE
Status: COMPLETED | OUTPATIENT
Start: 2021-06-12 | End: 2021-06-12

## 2021-06-12 RX ADMIN — Medication 5 ML: at 10:02

## 2021-06-12 RX ADMIN — SODIUM CHLORIDE 1000 ML: 9 INJECTION, SOLUTION INTRAVENOUS at 10:47

## 2021-06-12 RX ADMIN — CEFTRIAXONE SODIUM 2 G: 2 INJECTION, POWDER, FOR SOLUTION INTRAMUSCULAR; INTRAVENOUS at 10:18

## 2021-06-12 ASSESSMENT — PAIN SCALES - GENERAL: PAINLEVEL: MODERATE PAIN (4)

## 2021-06-12 NOTE — TELEPHONE ENCOUNTER
I called Dr. Castellanos's office at BMT/Hem office and left voicemail with his RN Ry. I informed him if they can contact patient and follow up with him in regards to whether patient is needing to continue his Bactrim, Fluconazole, and Acyclovir. Patient had been confused at clinic visit with us on whether he needs to still be on these or not. Their office should be contacting patient in regards to this.    Kacey Weldon, CMA

## 2021-06-12 NOTE — TELEPHONE ENCOUNTER
PA initiated for Zydelig.     Medication Prior Authorization    Start Date: 10/13/20  Type: New  Urgency: Urgent  Med Type: Specialty  Insurance Info: [employer] - Phone [phone] Fax [phone]  Method: ePA  Reference #:  Key: [id number] - PA Case ID: [id number]  ___________________________________________________________________________________________________________________:

## 2021-06-12 NOTE — TELEPHONE ENCOUNTER
----- Message from Mar Francisco CNP sent at 10/27/2020 12:34 PM CDT -----  Regarding: FW: prophylactic antibiotics  Kacey-    Can you reach Dr. Corral's RN at [facility] BMT/Hem to have them follow up with patient as to whether he can stop these now?     Thanks  Mar  ----- Message -----  From: Cm Diaz, PharmD  Sent: 10/27/2020  12:30 PM CDT  To: Mar Francisco CNP, #  Subject: RE: prophylactic antibiotics                     Mar,  I agree with your assessment. He does not need to continue these prophylactic meds due to the idelalisib. Thanks for checking! Philip      ----- Message -----  From: Mar Francisco CNP  Sent: 10/27/2020  12:13 PM CDT  To: Kacey Weldon CMA, #  Subject: prophylactic antibiotics                         Cm/emma    Juvenal was started on Bactrim twice weekly, acyclovir two times a day and fluconazole as prophylaxis by his [facility] hematologist over the summer. I believe for his prior clinical trial treatment.     He's now been changed to idelalisib. He is not certain if he is to continue these prophylactic meds on this therapy or not. I could not tell by review of Up-to-Date this is recommended. Can you verify?    If you don't think so, Kacey - can you f/u with his BMT/Hem MD office at [facility] to find out if they'd like him to continue on these or stop now?    Thanks

## 2021-06-12 NOTE — TELEPHONE ENCOUNTER
Oral Chemotherapy Monitoring Program    Oral Chemotherapy Monitoring    Date: 11/6/20  Primary Oncologist: Rob Crooks MD  Primary Oncology Clinic:  [facility]  Cancer Diagnosis: Follicular lymphoma (H)  Drug Name: Idelalisib  Current Dosage: 150 mg BID  Current Frequency: Continuous  Encounter: Mid-Cycle Assessment   New Start:   until disease progression or unacceptable toxcities   Intervention: Adherence/Side Effects, See in clinic      Who was educated?: Patient       Mid-Cycle Assessment:  Start Date of Last Cycle: 10/21/20   Unplanned doses missed in last 2 weeks: 0   Adherence assessment : adherent   Non-adherence intervention recommendation: Has an alarm for the nighttime dose as this one is harder to remember   Drug Interaction Assessment: There were no significant drug interactions identified upon review of medication list with chemotherapy agents.   Adverse Effects reported: 1   Adverse Events and Interventions: Diarrhea (grade 1): he said he has had diarrhea a couple of times since starting but it has gone away without him taking anything so it is tolerable. Discussed use of loperamide if needed. Monitor.   Next pharmacist intervention date: 11/13/20   Intervention: R           Subjective/Objective:  Juvenal Blake is a 55 y.o. male called by phone for a follow-up visit for oral chemotherapy.  Juvenal said he has been taking this a little over two weeks, started on a Wednesday. He has been taking the 150 mg two times a day and hasn't missed any doses. He said he seems to be doing okay. Main side effect was diarrhea, but only has happened a couple times and hasn't had to take anything. We did discuss some of the signs and symptoms to monitor for liver changes, such as abdominal pain, dark or brown urine or yellowing of the eyes. He will be reaching out to Dr. Corral's office as he still hasn't gotten a response on if he should continue some of the antibiotics as discussed previously in  other encounters. I did let him know we have reached out to his office, but haven't received direction on this yet. Additionally, he was wondering how he gets refills. I gave him [facility] phone number to call if he hasn't received a call setting up shipping to him when he has a few days left. [facility] usually can get medications out relatively quickly.    No flowsheet data found.    Assessment/Plan:  Juvenal is tolerating therapy so far.    Follow-Up:  Will review 11/13 appointment with Dr. Crooks. He knows he has an appointment on Monday for IVIG and Wednesday for imaging. He has our number if he has any additional questions.    Bre Carrillo (Chow), PharmD  Oral Chemotherapy Pharmacist  [phone]

## 2021-06-12 NOTE — TELEPHONE ENCOUNTER
Free Drug Application Initiated  [facility]  [employer]  Phone # [phone]  Fax # :[phone]  Additional Information : Send free drug application and will be following up with the foundation and the patient.

## 2021-06-12 NOTE — PATIENT INSTRUCTIONS
Please reschedule labs and low dose chemo for  protocol on 6/14, 6/15, and 6/16    Needs BMT NAYA visit on 6/14, 6/15, and 6/16

## 2021-06-12 NOTE — NURSING NOTE
"Oncology Rooming Note    June 12, 2021 10:13 AM   Juvenal Blake is a 55 year old male who presents for:    Chief Complaint   Patient presents with     Port Draw     Labs drawn via port by rn in lab. VS taken.     RECHECK     provider visit, scheduled infusion for follicular lymphoma     Initial Vitals: /72 (BP Location: Right arm, Patient Position: Sitting, Cuff Size: Adult Regular)   Pulse 85   Temp 99.6  F (37.6  C) (Oral)   Resp 20   Wt 108.9 kg (240 lb)   SpO2 99%   BMI 35.96 kg/m   Estimated body mass index is 35.96 kg/m  as calculated from the following:    Height as of 6/4/21: 1.74 m (5' 8.5\").    Weight as of this encounter: 108.9 kg (240 lb). Body surface area is 2.29 meters squared.  Moderate Pain (4) Comment: Data Unavailable   No LMP for male patient.  Allergies reviewed: Yes  Medications reviewed: Yes    Medications: Medication refills not needed today.  Pharmacy name entered into [employer]: [id number] - [place], [place] - [address] AT [facility]    Clinical concerns:  Patient denies fevers/vomiting/respiratory symptoms.  He is feeling very bloated and rates abdominal discomfort a 4/10.  Patient is burping which gives him some relief, no flatulence. He has had liquid stool twice today.  He has a question about increasing his Detrol.  He states he is not eating well, but is drinking a lot of water and his urine is dark.  He denies any bleeding or pain with urination.     ANGELA TEIXEIRA RN              "

## 2021-06-12 NOTE — PROGRESS NOTES
Pt here for rituxan. PT states doing good; shortness of breath continues with exertion. Port accessed and labs drawn. Labs reviewed. txt administered and pt tolerated txt without any side effects. Rituxan was infused at rapid infusion rate started at 100 ml per hour for 30 minutes then increased to 200 ml for remainder of infusion. Tubing flushed with ns upon completion of txt. Port flushed with heparin/deaccessed with 2x2 to site. Follow up reviewed and pt dc'd steady gait with wife.

## 2021-06-12 NOTE — TELEPHONE ENCOUNTER
"Oral Chemotherapy Monitoring Program  Oral Chemotherapy Monitoring    Date: 10/16/20  Primary Oncologist: Rob Crooks MD  Primary Oncology Clinic:  [facility]  Cancer Diagnosis: Follicular lymphoma (H)  Drug Name: Idelalisib  Current Dosage: 150 mg BID  Current Frequency: Continuous  Encounter: New Start   New Start:   until disease progression or unacceptable toxcities   Dose adjusted appropriately for: Hepatic Impairment   Drug Interaction Assessment: Upon review of medication list, the following potential drug interactions were identified    Interactions (Include medications and actions to be taken): Idelalisib increases exposure to tolterodine due to CY inhibition.   He's on the max dose tolterodine LA 4 mg, but unlikely to really cause more AE if he is currently stable (since not elderly), but could consider dose reducing in the future if he develops any symptoms of anticholinergic toxicity. Will monitor.   Intervention: Adherence/Side Effects, See in clinic      Who was educated?: Patient       Mid-Cycle Assessment:  Next pharmacist intervention date: 10/27/20        Subjective/Objective:  Juvenal Blake is a 55 y.o. male called by phone for an initial visit for oral chemotherapy education.      Vitals:  BP:   BP Readings from Last 1 Encounters:   20 117/74     Wt Readings from Last 1 Encounters:   20 (!) 108.9 kg (240 lb)     Estimated body surface area is 2.29 meters squared as calculated from the following:    Height as of 20: 5' 8\" (1.727 m).    Weight as of 20: 108.9 kg (240 lb).      Labs:  Lab Results   Component Value Date    WBC 4.9 2020    HGB 12.9 (L) 2020    HCT 37.9 (L) 2020    MCV 95 2020     (L) 2020     Results for orders placed or performed in visit on 20   Comprehensive Metabolic Panel   Result Value Ref Range    Sodium 140 136 - 145 mmol/L    Potassium 4.0 3.5 - 5.0 mmol/L    Chloride 105 98 - 107 mmol/L    " CO2 29 22 - 31 mmol/L    Anion Gap, Calculation 6 5 - 18 mmol/L    Glucose 96 70 - 125 mg/dL    BUN 13 8 - 22 mg/dL    Creatinine 1.09 0.70 - 1.30 mg/dL    GFR MDRD Af Amer >60 >60 mL/min/1.73m2    GFR MDRD Non Af Amer >60 >60 mL/min/1.73m2    Bilirubin, Total 0.3 0.0 - 1.0 mg/dL    Calcium 9.2 8.5 - 10.5 mg/dL    Protein, Total 6.9 6.0 - 8.0 g/dL    Albumin 3.9 3.5 - 5.0 g/dL    Alkaline Phosphatase 66 45 - 120 U/L    AST 26 0 - 40 U/L    ALT 39 0 - 45 U/L           Assessment:  Patient is appropriate to start therapy.    Plan:  Basic chemotherapy teaching was reviewed with patient including indication, start date of therapy, dose, administration, adverse effects, missed doses, food and drug interactions, monitoring, side effect management, office contact information, and safe handling. Written materials were provided and all questions answered.    Satish is still working on access so unknown when he will be able to have medication in hand.     Follow-Up:  Will plan to call Juvenal about a week after starting therapy. Next scheduled appointment is currently 10/27 with Teresa Diaz, PharmD, [facility]  Oral Chemotherapy Pharmacist  [phone]

## 2021-06-12 NOTE — TELEPHONE ENCOUNTER
Call came in from [facility] pharmacy stating that the new medication idelalisib 150 mg Tab increases the effectiveness of Detrol LA.  It is recommended that the max dose of Detrol LA is 2 mg daily.  I discussed this with Dr. Crooks who states that we will send in a new prescription for Detrol LA 2 mg tablets daily.  This has been sent off to the patient's local pharmacy.  Patient notified of the update.  He verbalized understanding.    Estrella Vargas RN

## 2021-06-12 NOTE — TELEPHONE ENCOUNTER
Free Drug Application Denied  Denial Reason(s) : Not denied but its no longer needed due to patient having insurance now.   Patient notified? Yes  Additional Information : RX was sent to [facility]

## 2021-06-12 NOTE — PROGRESS NOTES
BMT Clinic Note   Jun 12, 2021      Juvenal Blake is a 55 year old year old male diagnosed with NHL.  He has been treated with Bendamustine + Rituxan then Bendamustine +  Rituxan once again the R-CHOP followed by Nam-NK cell infusion. He is enrolled in protocol VQ3593-56, which utilizes Cy/Flu as a lymphodepletion regimen as bridge to [place]a.    HPI:  Juvenal came in yesterday and had temp to 101 with chills.& achiness.  Notes today that he has not had another fever. No chills. Cough is present. Some nausea. Stomach full and feels distended but no vomiting and having normal bowel movements. No bleeding or bruising. Not sleeping great as he gets up to urinate multiple times a night. No dysuria  Review of Systems: 8 point ROS negative except as noted above.    Physical Exam:   Blood pressure 124/72, pulse 85, temperature 99.6  F (37.6  C), temperature source Oral, resp. rate 20, weight 108.9 kg (240 lb), SpO2 99 %.  General: NAD   Eyes: : JOE, sclera anicteric   Lungs: CTA bilaterally. Some scattered rhonchi  Cardiovascular: RRR, no M/R/G   Abdominal/Rectal: +protuberant  Lymphatics: no edema  Skin: no rashes or petechaie  Neuro: A&O   Additional Findings: Jacobs site NT, no drainage.  Labs:    Results for JUVENAL BLAKE (MRN [id number]) as of 6/12/2021 10:29   Ref. Range 6/11/2021 10:48 6/11/2021 12:00 6/12/2021 10:09   WBC Latest Ref Range: 4.0 - 11.0 10e9/L 13.5 (H)  16.4 (H)   Hemoglobin Latest Ref Range: 13.3 - 17.7 g/dL 10.6 (L)  10.2 (L)   Hematocrit Latest Ref Range: 40.0 - 53.0 % 34.0 (L)  31.6 (L)   Platelet Count Latest Ref Range: 150 - 450 10e9/L 82 (L)  86 (L)   RBC Count Latest Ref Range: 4.4 - 5.9 10e12/L 3.74 (L)  3.55 (L)   MCV Latest Ref Range: 78 - 100 fl 91  89   MCH Latest Ref Range: 26.5 - 33.0 pg 28.3  28.7   MCHC Latest Ref Range: 31.5 - 36.5 g/dL 31.2 (L)  32.3   RDW Latest Ref Range: 10.0 - 15.0 % 15.9 (H)  16.0 (H)      Results for JUVENAL BLAKE (MRN [id number]) as of 6/12/2021  10:39   Ref. Range 6/12/2021 10:09   Sodium Latest Ref Range: 133 - 144 mmol/L 136   Potassium Latest Ref Range: 3.4 - 5.3 mmol/L 4.3   Chloride Latest Ref Range: 94 - 109 mmol/L 102   Carbon Dioxide Latest Ref Range: 20 - 32 mmol/L 26   Urea Nitrogen Latest Ref Range: 7 - 30 mg/dL 13   Creatinine Latest Ref Range: 0.66 - 1.25 mg/dL 1.48 (H)   GFR Estimate Latest Ref Range: >60 mL/min/1.73_m2 52 (L)   GFR Estimate If Black Latest Ref Range: >60 mL/min/1.73_m2 60 (L)   Calcium Latest Ref Range: 8.5 - 10.1 mg/dL 9.2   Anion Gap Latest Ref Range: 3 - 14 mmol/L 8   Magnesium Latest Ref Range: 1.6 - 2.3 mg/dL 1.7   Lactate Dehydrogenase Latest Ref Range: 85 - 227 U/L 268 (H)       6/11/21 Blood cultures: NGTD    6/11/21 CXR: clear  Assessment and Plan:   1.  Lymphoma:  - He had 6 cycles of bendamustine and Rituxan completed in October, 2010. He had 2 years of maintenance Rituxan therapy finishing in September, 2012.  - Second course of bendamustine plus rituximab started February 20, 2017. Cycle 6 was completed on July 11, 2017. Then had maintenance rituximab through January, 2019.  - O-CHOP started at second relapse. Cycle 1 given April 23, 2019. Cycle 6 given August 16, 2019.  Haplo NAM-NK therapy at [facility] September 1, 2020  - Iidelalisib started October 21, 2020 at [facility]. 150 mg twice daily.      - Eligible to proceed with therapy with .  stopped Idelalisib - last dose was on 6/10 at 8am. Half life is 8 1/2 hours - x 5 = 42 hours. Plan was to begin LD chemotherapy 6/11 and receive  cells on 6/16, but since he has a new fever on 6/11 the plan was to hold off through the weekend.  LDH trending up so should not postpone chemo too long. Uric Acid check is normal    2.  HEME:  No need for transfusions today    3.  :   Initial radical prostatectomy and bilateral lymph node dissection. November 15, 2017. Prostatic adenocarcinoma [place] 4+3 = 7 with tertiary pattern 5. Tumor involves 45% of total  surface area. Positive for extensive extraprostatic extension. Positive for bilateral seminal vesicle invasion. Multiple margins positive. Lymphovascular invasion not identified. Perineural invasion was noted. Lymph nodes negative. 3 were examined (2 right obturator and 1 left obturator).    Completed radiation to the prostate fossa and pelvic lymph nodes at [facility] early May 2018. He received 4500 cGy in 25 fractions. He then had 2340 cGy boost in 13 fractions to the prostatic fossa, for a total dose of 6240 cGy in 38 treatment fractions delivered over 54 days. He did not receive hormonal therapy:      4.  ID:  Febrile yesterday. Cultures negative for blood and CXR clear.  - Send blood cx, RVP & repeat Covid.  COVID negative. RVP + for Rhinovirus  - Rocephin 2gm daily through the weekend and if negative consider start Monday 6/14    5.  FEN/Renal:  Jump in creatinine to 1.48 from 1.15. Not drinking great. Will give 1L NS today and check uric acid. If uric acid elevated will give Rasburicase.    Final Plan:  - Rocephin  - 1L NS  - RTC tomorrow     Rosenda Bentley MD

## 2021-06-12 NOTE — PROGRESS NOTES
Juvenal arrived A&OX4 ambulatory and stable, confirms he is here for IVIG to treat chronic sinusitis. Seated in chair #4.  He previously had ongoing sinus congestion, chest tightness and cough that is sometimes productive but notes much improvement of his symptoms. He denies fevers. POC/medication education reviewed, pt stated understanding and agreed to plan. Port accessed with ease, line had excellent blood return, labs were drawn for Freddie 11/13/2020 Clinic appt with Dr. Crooks, and line easily flushed. VS WNL.  No pre meds ordered  0934- 1215 IVIG 40g infused at titrated rate per protocol; Juvenal tolerated infusion w/o sxs adverse reaction.  Line flushed NS and pt monitored 30min post infusion. VSS   port flushed NS20mL, instilled w heparin 6mL 1:100 and gripper needle dc'd, site covered w gauze and papertape.  Dc education/AVS reviewed, pt stated understanding and that his needs were met today.  1225 Juvenal katz'd A&Ox4 ambulatory and stable.

## 2021-06-12 NOTE — LETTER
6/12/2021         RE: Juvenal Blake  [address] [place] [address]        Dear Colleague,    Thank you for referring your patient, Juvenal Blake, to the [facility] [place]. Please see a copy of my visit note below.    Infusion Nursing Note:  Juvenal Blake presents today for scheduled and add-on infusions.    Patient seen by provider today: Yes: Dr. Bentley   present during visit today: Not Applicable.    Note: Labs were monitored.    Intravenous Access:  Implanted Port.    Treatment Conditions:  Patient received scheduled scheduled IV push Rocephin.  Per Provider order, he received an add-on IVF infusion over one hour for a creatinine of 1.48.      Post Infusion Assessment:  Patient tolerated infusion without incident.       Discharge Plan:   Patient discharged in stable condition accompanied by: wife.      ANGELA TEIXEIRA, VALERIE                          Again, thank you for allowing me to participate in the care of your patient.        Sincerely,        [facility]

## 2021-06-12 NOTE — PROGRESS NOTES
Infusion Nursing Note:  Juvenal Blake presents today for scheduled and add-on infusions.    Patient seen by provider today: Yes: Dr. Bentley   present during visit today: Not Applicable.    Note: Labs were monitored.    Intravenous Access:  Implanted Port.    Treatment Conditions:  Patient received scheduled scheduled IV push Rocephin.  Per Provider order, he received an add-on IVF infusion over one hour for a creatinine of 1.48.      Post Infusion Assessment:  Patient tolerated infusion without incident.       Discharge Plan:   Patient discharged in stable condition accompanied by: wife.      ANGELA TEIXEIRA RN

## 2021-06-12 NOTE — PROGRESS NOTES
[facility] Hematology and Oncology Progress Note    Patient: Juvenal Blake  MRN: [id number]        Assessment and Plan:    1.  Follicular lymphoma: He recently had a right axillary lymph node biopsy.  I reviewed the pathology with him.  It is read as a grade 1-2 follicular lymphoma with potentially a second cell population. Dr. Crooks referred him to the [facility] for consideration of autologous transplant for refractory follicular lymphoma or possibly cellular therapy or clinical trial options. He has met with Dr. Corral. He went onto experimental therapy with NAM-NK but had progression clinically and PET a few weeks ago. He has a large palpable right inguinal node and excisional biopsy confirms persistent follicular lymphoma with grade 3 pathology, but no evident diffuse B-cell transformation (Ki 20-30%) Repeat bone marrow biopsy showed no morphologic or immunophenotypic B cell lymphoma and was  30-40% cellular..    It's been recommended he get started on preparative therapy with idelalisib 150 mg. He's been on this about one week now. Tolerating it well, with just minimal diarrhea 1-2 days now resolved.     He was also started on Allopurinol for tumor lysis prophylaxis.     CBC, CMP and tumor lysis labs unremarkable.    Clinically, the right inguinal mass is smaller and his back pain is improved.    He will require CBC and CMP monitoring every 2 weeks for the first few months on this therapy, then every month thereafter. Will have him return in 2 weeks with labs and clinical assessment.    Plan is to repeat a PET scan 2.5-3 weeks on this therapy to reassess response, so will do this at his return in 2 weeks. If there is a dramatic response, Dr. Corral is recommending continuing with the idelalisib alone and defer the trial for future time of progression. If, on the other hand, there is progression or minimal response he will move forward in enrolling onto a NK cellular therapy phase I trial.     He has  continued on prophylactic bactrim, acyclovir and fluconazole from his prior treatment at [facility] and is unsure when to stop. Will clarify this for him.    2.  Prostate cancer:    PSA is stable at low level, last checked in April. Will recheck at next visit. Checking every 3 months.  No clinical or radiographic evidence of recurrent/progressive prostate cancer.    3.  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 This did seem to reduce the frequency of his recurrent infections, last needed antibiotics in January, 2020. He has recurrent sinus congestion/cough for 2 weeks. No fevers. Will treat with doxycycline and agumentin. We will resumed his  monthly IVIG when he returns in a few weeks.     4.  Living will  He's completing his living will/POA and wants to have it reviewed/notorized. We will check with our Oncology SW to see if she can meet with him at his next return visit. He will bring the paperwork back at that time.    ECOG Performance   ECOG Performance Status: 1    Distress Assessment  Distress Assessment Score: 5    Pain  Currently in Pain: Yes  Pain Score (Initial OR Reassessment): 3  Location: lower back pain    Diagnosis:    1.  Follicular lymphoma: Diagnosed in April, 2010 . Stage IV diagnosis with bone marrow involvement.  Initial disease involved the cervical, supraclavicular, axillary, mesenteric mass, retroperitoneal nodes, and possibly the manubrium.  Relapse noted on imaging in December 2016.  Axillary node biopsy from February 2017 shows low-grade follicle center cell lymphoma.  Second relapse on imaging January 2019.  Repeat biopsy January 7, 2019 of a right medial thigh lymph node shows recurrent follicular lymphoma.  Grade 2.  Third relapse on imaging in April, 2020. Pathologic confirmation May 28, 2020 from a right axillary lymph node excisional biopsy; grade 1-2 follicular cancer  with 2nd cell population.       2.  Prostate cancer:  Pathologic stage T3b prostate cancer.  Positive margins, multiple, and surgery.  3 lymph nodes were negative.  High risk for recurrent disease.  He is being followed at [facility].  Maricel 4+3 = 7.  Tertiary Inkom pattern 5 and preoperative PSA of 27.     3.  Hypogammaglobulinemia: He has received intermittent doses of IVIG.    Treatment:    Lymphoma:    He had 6 cycles of bendamustine and Rituxan completed in October, 2010.  He had 2 years of maintenance Rituxan therapy finishing in September, 2012.    Second course of bendamustine plus rituximab started February 20, 2017.  Cycle 6 was completed on July 11, 2017.  Then had maintenance rituximab through January, 2019.    O-CHOP started at second relapse.  Cycle 1 given April 23, 2019.  Cycle 6 given August 16, 2019.    June, 2020: Referred to [facility]row [facility]plant (Dr. Corral)  ---> Treated on clinical trial with haplo NK NAM therapy.   30 days post-treatment, 9/30/20 PET showed progression. Excisional biopsy of right inguinal mass: persistent follicular lymphoma with significant grade 3 pathology, no DLBCL transformation. Flow confirmed clonal Be cells strongly positive for CD20 and also CD10, BCL6, BCL2. Ki67 20-30% positive. Repeat bone marrow biopsy showed no morphologic or immunophenotypic evidence of B-cell lymphoma and 30-40% cellular.   -->10/21/20: started idelalisib 150 mg two times a day       Prostate:   Initial radical prostatectomy and bilateral lymph node dissection.  November 15, 2017.  Prostatic adenocarcinoma Inkom 4+3 = 7 with tertiary pattern 5.  Tumor involves 45% of total surface area.  Positive for extensive extraprostatic extension.  Positive for bilateral seminal vesicle invasion.  Multiple margins positive.  Lymphovascular invasion not identified.  Perineural invasion was noted. Lymph nodes negative. 3 were examined (2 right obturator and 1 left obturator).  Completed  radiation to the prostate fossa and pelvic lymph nodes at [facility] early May 2018.  He received 4500 cGy in 25 fractions.  He then had 2340 cGy boost in 13 fractions to the prostatic fossa, for a total dose of 6240 cGy in 38 treatment fractions delivered over 54 days.  He did not receive hormonal therapy.    Interim History:    Juvenal returns today for follow-up visit one week after starting new therapy with idelalisib. This was recommended by N for progression after an experimental therapy there. The large right inguinal node is already smaller/softer. His back pain is better. Tolerating the drug well. Mild diarrhea x 2 days initially, now resolved. Mild nausea, not requiring antiemetics. Good appetite, but early satiety. No change in baseline dyspnea. He recently completed a course of Levaquin for pneumonia. Chronic recurrent sinusitis and cough is bothering him a bit more x 2 weeks. He had been on monthly IVIG which helped reducing recurrent symptoms and he has not required antibiotics since January, but has been off IVIG through the summer.     Review of Systems:  Constitutional  Constitutional (WDL): Exceptions to WDL  Fatigue: Concerns  Neurosensory  Neurosensory (WDL): All neurosensory elements are within defined limits  Eye   Eye Disorder (WDL): All eye disorder elements are within defined limits  Ear  Ear Disorder (WDL): All ear disorder elements are within defined limits  Cardiovascular  Cardiovascular (WDL): Exceptions to WDL  Edema: Concerns  Pulmonary  Respiratory (WDL): Exceptions to WDL  Dyspnea: Concerns(with any activity)  Gastrointestinal  Gastrointestinal (WDL): Exceptions to WDL  Nausea: Concerns(today)  Dehydration: Concerns(trying more to stay hydrated)  Diarrhea: Concerns(beginning of starting chemo drug)  Genitourinary  Genitourinary (WDL): All genitourinary elements are within defined limits  Lymphatic  Lymph (WDL): All lymph disorder elements are within defined  "limits  Musculoskeletal and Connective Tissue  Musculoskeletal and Connetive Tissue Disorders (WDL): Exceptions to WDL  Myalgia: Concerns(back pain)  Integumentary  Integumentary (WDL): All integumentary elements are within defined limits  Patient Coping  Patient Coping: Open/discussion;Accepting  Accompanied by  Accompanied by: Alone    Past History:    Past Medical History:   Diagnosis Date     Arthritis     shoulder per H & P     GERD (gastroesophageal reflux disease)      H/O pyloric stenosis     as an infant per H & P      Inguinal hernia     per H & P      Lazy eye     per H & P      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 Nontoxic and in no distress. Alone.  HEENT: Head: atraumatic, normocephalic. Sclerae anicteric.  Lymph: 4-5 cm soft right inguinal mass, non-tender. No appreciable cervical nor axillary adenopathy.  Chest: right lower lobe rhonchi, cleared with coughing. Clear otherwise.  Cardiac:  No edema.   Abdomen: abdomen is non-distended, mildly tender.  Extremities: normal tone and muscle bulk.  Skin: no lesions or rash.   CNS: alert and oriented. Grossly non-focal.   Psychiatric: normal mood and affect.     Lab Results:    Recent Results (from the past 240 hour(s))   Comprehensive Metabolic Panel    Collection Time: 10/27/20 11:17 AM   Result Value Ref Range    Sodium 138 136 - 145 mmol/L    Potassium 4.1 3.5 - 5.0 mmol/L    Chloride 105 98 - 107 mmol/L    CO2 26 22 - 31 mmol/L    Anion Gap, Calculation 7 5 - 18 mmol/L    Glucose 103 70 - 125 mg/dL    BUN 11 8 - 22 mg/dL    Creatinine 1.15 0.70 - 1.30 mg/dL    GFR MDRD Af Amer >60 >60 mL/min/1.73m2    GFR " MDRD Non Af Amer >60 >60 mL/min/1.73m2    Bilirubin, Total 0.3 0.0 - 1.0 mg/dL    Calcium 8.8 8.5 - 10.5 mg/dL    Protein, Total 6.4 6.0 - 8.0 g/dL    Albumin 3.8 3.5 - 5.0 g/dL    Alkaline Phosphatase 87 45 - 120 U/L    AST 17 0 - 40 U/L    ALT 20 0 - 45 U/L   HM1 (CBC with Diff)    Collection Time: 10/27/20 11:17 AM   Result Value Ref Range    WBC 5.5 4.0 - 11.0 thou/uL    RBC 3.86 (L) 4.40 - 6.20 mill/uL    Hemoglobin 11.6 (L) 14.0 - 18.0 g/dL    Hematocrit 35.6 (L) 40.0 - 54.0 %    MCV 92 80 - 100 fL    MCH 30.1 27.0 - 34.0 pg    MCHC 32.6 32.0 - 36.0 g/dL    RDW 14.5 11.0 - 14.5 %    Platelets 185 140 - 440 thou/uL    MPV 8.9 8.5 - 12.5 fL    Neutrophils % 49 (L) 50 - 70 %    Lymphocytes % 34 20 - 40 %    Monocytes % 13 (H) 2 - 10 %    Eosinophils % 3 0 - 6 %    Basophils % 1 0 - 2 %    Immature Granulocyte % 1 (H) <=0 %    Neutrophils Absolute 2.7 2.0 - 7.7 thou/uL    Lymphocytes Absolute 1.9 0.8 - 4.4 thou/uL    Monocytes Absolute 0.7 0.0 - 0.9 thou/uL    Eosinophils Absolute 0.2 0.0 - 0.4 thou/uL    Basophils Absolute 0.1 0.0 - 0.2 thou/uL    Immature Granulocyte Absolute 0.0 <=0.0 thou/uL   Phosphorus    Collection Time: 10/27/20 11:17 AM   Result Value Ref Range    Phosphorus 2.6 2.5 - 4.5 mg/dL   Uric Acid    Collection Time: 10/27/20 11:17 AM   Result Value Ref Range    Uric Acid 2.6 (L) 3.0 - 8.0 mg/dL        Imaging:    No results found.    Total time: 35 min; 25 min counseling/coordination of care    Signed by: Mar Francisco, MARIA GUADALUPE

## 2021-06-12 NOTE — TELEPHONE ENCOUNTER
Patient advised per Dr Crooks that the medication recommended by Dr. Goodrich has been sent to pharmacy.  Our pharmacist will let him know when it is ready.     Pat also advised that Dr Crooks sent in a prescription for allopurinol for him.  This he can  at his pharmacy and start taking.  Patient verbalized understanding.

## 2021-06-12 NOTE — PROGRESS NOTES
[facility] Hematology and Oncology Progress Note    Patient: Juvenal Blake  MRN: [id number]  Date of Service:  August 14, 2017      Assessment and Plan:    1.  Follicular lymphoma:  His posttreatment PET scan images were reviewed.  He appears to be in remission again.  No evidence of residual or recurrent disease.  We will see him back in 4 months with a CT scan.  We will image him every 4 months for 2 years and then switch to every six-month imaging.  We discussed consolidation.  We are going to proceed with consolidation Rituxan.  Given his good response to his first cycle of chemotherapy I do not think he needs to be referred for consideration of autologous stem cell transplant at this time.      2.  Upper respiratory symptoms: Given his immunosuppressed state going to treat him with 10 days of Augmentin.  Prescription was sent in for this.    3.  PET positive prostate nodules.  These are present on his previous scan in February.  They seem to have progressed some.  No symptoms of prostatitis.  Will refer him to urology for consideration of biopsy.    4.  Elevated TSH: We'll follow for now.  He does not have a history of hypothyroidism.    ECOG Performance   ECOG Performance Status: 0    Distress Assessment  Distress Assessment Score: 2    Pain  Currently in Pain: Yes  Pain Score (Initial OR Reassessment): 3  Location: throat and chest    Diagnosis:    1.  Follicular lymphoma: Diagnosed in April, 2010 . Stage IV diagnosis with bone marrow involvement.  Initial disease involved the cervical, supraclavicular, axillary, mesenteric mass, retroperitoneal nodes, and possibly the manubrium.    Relapse noted on imaging in December 2016.    Treatment:    1.  He had 6 cycles of bendamustine and Rituxan completed in October, 2010.  He had 2 years of maintenance Rituxan therapy finishing in September, 2012.    2.  Second course of bendamustine plus rituximab started February 20, 2017.  Cycle 6 was completed on July 11,  "2017.    Interim History:    Juvenal returns today for follow-up visit. He is here for a post-treatment PET scan. He finished chemotherapy about a month ago. Overall, he tolerated his therapy well. Side effects are improving. Energy and appetite are good. He is working. Continues to have some intermittent dizziness. Also has symptoms consistent with an upper respiratory tract infection. He has also noticed some more heartburn.    Review of Systems:    Constitutional  Constitutional (WDL): Exceptions to WDL  Fatigue: Fatigue relieved by rest  Fever: None  Chills: None  Weight Gain: 5 - <10% from baseline (5# since 8/10/17)  Weight Loss: None  Neurosensory  Neurosensory (WDL): Exceptions to WDL  Peripheral Motor Neuropathy: None  Ataxia: None  Peripheral Sensory Neuropathy: None  Confusion: None  Syncope: None  Cardiovascular  Cardiovascular (WDL): Exceptions to WDL (\"tightness in chest\"-upper chest to top of throat-constant-started 2 days ago)  Palpitations: Definition: A disorder characterized by inflammation of the muscle tissue of the heart.  Edema: Yes (bilat LE)  Pulmonary  Respiratory (WDL): Exceptions to WDL  Cough: None  Dyspnea: Shortness of breath with moderate exertion  Hypoxia: None  Gastrointestinal  Gastrointestinal (WDL): Exceptions to WDL  Anorexia: None  Constipation: None  Diarrhea: None  Dysphagia: Symptomatic, able to eat regular diet (sore throat x2 days)  Esophagitis: Asymptomatic, clinical or diagnostic observations only, intervention not indicated  Nausea: None  Pharyngitis: None  Vomiting: None  Dysgeusia: None  Dry Mouth: None  Genitourinary  Genitourinary (WDL): All genitourinary elements are within defined limits  Integumentary  Integumentary (WDL): Exceptions to WDL  Alopecia: None  Rash Maculo-Papular: Macules/papules covering <10% BSA with or without symptoms (e.g., pruritus, burning, tightness) (under left arm)  Patient Coping  Patient Coping: Accepting  Accompanied by  Accompanied by: " "Alone    Past History:    Past Medical History:   Diagnosis Date     Cancer      Non Hodgkin's lymphoma         Physical Exam:    Recent Vitals 8/14/2017   Height 5' 9\"   Weight 248 lbs 10 oz   BSA (m2) 2.34 m2   /80   Pulse 65   Temp 98.3   Temp src 1   SpO2 97   Some recent data might be hidden     General: patient appears stated age of 51 y.o.. Nontoxic and in no distress.   HEENT: Head: atraumatic, normocephalic. Sclerae anicteric.  Chest:  Normal respiratory effort.    Cardiac: No edema.  Abdomen: abdomen is non-distended  Extremities: normal tone and muscle bulk.   Skin: no lesions or rash. Warm and dry.   CNS: alert and oriented x3. Grossly non-focal.   Psychiatric: normal mood and affect.     Lab Results:    Recent Results (from the past 168 hour(s))   POCT Glucose   Result Value Ref Range    Glucose, POC 90 mg/dL   Comprehensive Metabolic Panel   Result Value Ref Range    Sodium 141 136 - 145 mmol/L    Potassium 4.1 3.5 - 5.0 mmol/L    Chloride 107 98 - 107 mmol/L    CO2 25 22 - 31 mmol/L    Anion Gap, Calculation 9 5 - 18 mmol/L    Glucose 107 70 - 125 mg/dL    BUN 10 8 - 22 mg/dL    Creatinine 0.97 0.70 - 1.30 mg/dL    GFR MDRD Af Amer >60 >60 mL/min/1.73m2    GFR MDRD Non Af Amer >60 >60 mL/min/1.73m2    Bilirubin, Total 0.4 0.0 - 1.0 mg/dL    Calcium 9.1 8.5 - 10.5 mg/dL    Protein, Total 6.2 6.0 - 8.0 g/dL    Albumin 3.8 3.5 - 5.0 g/dL    Alkaline Phosphatase 71 45 - 120 U/L    AST 30 0 - 40 U/L    ALT 49 (H) 0 - 45 U/L   HM1 (CBC with Diff)   Result Value Ref Range    WBC 4.0 4.0 - 11.0 thou/uL    RBC 3.83 (L) 4.40 - 6.20 mill/uL    Hemoglobin 10.8 (L) 14.0 - 18.0 g/dL    Hematocrit 32.1 (L) 40.0 - 54.0 %    MCV 84 80 - 100 fL    MCH 28.2 27.0 - 34.0 pg    MCHC 33.6 32.0 - 36.0 g/dL    RDW 16.8 (H) 11.0 - 14.5 %    Platelets 158 140 - 440 thou/uL    MPV 9.7 8.5 - 12.5 fL   Manual Differential   Result Value Ref Range    Total Neutrophils % 66 50 - 70 %    Lymphocytes % 21 20 - 40 %    Monocytes " % 7 2 - 10 %    Eosinophils %  5 0 - 6 %    Basophils % 1 0 - 2 %    Total Neutrophils Absolute 2.6 2.0 - 7.7 thou/ul    Lymphocytes Absolute 0.8 0.8 - 4.4 thou/uL    Monocytes Absolute 0.3 0.0 - 0.9 thou/uL    Eosinophils Absolute 0.2 0.0 - 0.4 thou/uL    Basophils Absolute 0.0 0.0 - 0.2 thou/uL    Platelet Estimate Normal Normal     Imaging:    PET scan images were personally reviewed.  These show complete response when compared to imaging from February.    Nm Pet Ct Skull To Mid Thigh    Result Date: 8/10/2017  [facility] PET FDG/CT 8/10/2017 3:13 PM INDICATION: Lymphoma, restaging. Interval chemotherapy. Subsequent treatment strategy. TECHNIQUE: Serum glucose level 90 mg/dL. One hour post intravenous administration of 9.7 mCi F-18 FDG, PET imaging was performed from the skull base to the mid thighs, utilizing attenuation correction with concurrent axial CT and PET/CT image fusion. Dose reduction techniques were used. COMPARISON: PET/CT 02/13/2017. FINDINGS: Most of the FDG avid adenopathy involving scattered sites above and below the diaphragm depicted previously has resolved with normalization of normal size and resolution of uptake to background level, Deauville 1. Right inguinal adenopathy has decreased in size and most nodes demonstrate resolution of uptake to background level except for a single node with residual mild uptake (SUVmax 3.4, previously 5.8), similar to background liver level, Deauville 3. Areas of existing prominent mediastinal  and retroperitoneal adenopathy demonstrates moderate residual areas of stranding and minimal avid nodularity less than background blood pool level, consistent with post treatment inflammation and scarring. Uptake in both iliac bones depicted previously has resolved to background level. Normal size liver and spleen with normal uptake. Persistent areas of uptake in the prostate gland (for example, axial fused image 43) including centrally which is most  suggestive of physiologic/excretory urethral uptake although uptake in the right gland is nonspecific and could be inflammatory or neoplastic. Right IJ Port-A-Cath with tip near the SVC/RA junction. Persistent decreased attenuation of blood pool relative to myocardium suggesting anemia. Right renal cyst. Minimal atherosclerotic calcifications. Mild degenerative changes spine.     CONCLUSION: 1. Complete treatment response of lymphoma. 2. Persistent uptake in the prostate, more suspicious on the right, which is nonspecific but could be inflammatory or neoplastic in the setting of prostate cancer. Please correlate clinically.       Signed by: Rob Crooks MD

## 2021-06-12 NOTE — PROGRESS NOTES
Patient is here for labs and provider for Follicular lymphoma grade i, lymph nodes of multiple sites (H).

## 2021-06-12 NOTE — NURSING NOTE
Chief Complaint   Patient presents with     Port Draw     Labs drawn via port by rn in lab. VS taken.     Port previously accessed with 20g 3/4 inch power needle by RN, labs collected, line flushed with saline and heparin.  Vitals taken. Pt checked in for appointment(s).    Cole Valdez, RN

## 2021-06-12 NOTE — TELEPHONE ENCOUNTER
Dr. Crooks get back to me and let me know that he will send in this medication, idelalisib.  I contacted our pharmacy liaison to make him aware and to look into insurance coverage.  I called and updated Juvenal on this.  I did let him know that this is a specialty medication that will come from a specialty pharmacy and will be delivered directly to his house via the mail.  I did let him know that if the amount of money that he will need to pay out-of-pocket is too much, our pharmacy liaison will look into a ryan application for him.  Patient was appreciative of the information and looks forward to hearing from Satish, pharmacy liaison.    Estrella Vargas RN

## 2021-06-12 NOTE — LETTER
6/12/2021         RE: Juvenal Blake  [address] [place] [address]        Dear Colleague,    Thank you for referring your patient, Juvenal Blake, to the [facility] [place]. Please see a copy of my visit note below.    BMT Clinic Note   Jun 12, 2021      Juvenal Blake is a 55 year old year old male diagnosed with NHL.  He has been treated with Bendamustine + Rituxan then Bendamustine +  Rituxan once again the R-CHOP followed by Nam-NK cell infusion. He is enrolled in protocol [id number], which utilizes Cy/Flu as a lymphodepletion regimen as bridge to Yescarta.    HPI:  Juvenal came in yesterday and had temp to 101 with chills.& achiness.  Notes today that he has not had another fever. No chills. Cough is present. Some nausea. Stomach full and feels distended but no vomiting and having normal bowel movements. No bleeding or bruising. Not sleeping great as he gets up to urinate multiple times a night. No dysuria  Review of Systems: 8 point ROS negative except as noted above.    Physical Exam:   Blood pressure 124/72, pulse 85, temperature 99.6  F (37.6  C), temperature source Oral, resp. rate 20, weight 108.9 kg (240 lb), SpO2 99 %.  General: NAD   Eyes: : JOE, sclera anicteric   Lungs: CTA bilaterally. Some scattered rhonchi  Cardiovascular: RRR, no M/R/G   Abdominal/Rectal: +protuberant  Lymphatics: no edema  Skin: no rashes or petechaie  Neuro: A&O   Additional Findings: Jacobs site NT, no drainage.  Labs:    Results for JUVENAL BLAKE (MRN [id number]) as of 6/12/2021 10:29   Ref. Range 6/11/2021 10:48 6/11/2021 12:00 6/12/2021 10:09   WBC Latest Ref Range: 4.0 - 11.0 10e9/L 13.5 (H)  16.4 (H)   Hemoglobin Latest Ref Range: 13.3 - 17.7 g/dL 10.6 (L)  10.2 (L)   Hematocrit Latest Ref Range: 40.0 - 53.0 % 34.0 (L)  31.6 (L)   Platelet Count Latest Ref Range: 150 - 450 10e9/L 82 (L)  86 (L)   RBC Count Latest Ref Range: 4.4 - 5.9 10e12/L 3.74 (L)  3.55  (L)   MCV Latest Ref Range: 78 - 100 fl 91  89   MCH Latest Ref Range: 26.5 - 33.0 pg 28.3  28.7   MCHC Latest Ref Range: 31.5 - 36.5 g/dL 31.2 (L)  32.3   RDW Latest Ref Range: 10.0 - 15.0 % 15.9 (H)  16.0 (H)      Results for TARA BERTRAND (MRN [id number]) as of 6/12/2021 10:39   Ref. Range 6/12/2021 10:09   Sodium Latest Ref Range: 133 - 144 mmol/L 136   Potassium Latest Ref Range: 3.4 - 5.3 mmol/L 4.3   Chloride Latest Ref Range: 94 - 109 mmol/L 102   Carbon Dioxide Latest Ref Range: 20 - 32 mmol/L 26   Urea Nitrogen Latest Ref Range: 7 - 30 mg/dL 13   Creatinine Latest Ref Range: 0.66 - 1.25 mg/dL 1.48 (H)   GFR Estimate Latest Ref Range: >60 mL/min/1.73_m2 52 (L)   GFR Estimate If Black Latest Ref Range: >60 mL/min/1.73_m2 60 (L)   Calcium Latest Ref Range: 8.5 - 10.1 mg/dL 9.2   Anion Gap Latest Ref Range: 3 - 14 mmol/L 8   Magnesium Latest Ref Range: 1.6 - 2.3 mg/dL 1.7   Lactate Dehydrogenase Latest Ref Range: 85 - 227 U/L 268 (H)       6/11/21 Blood cultures: NGTD    6/11/21 CXR: clear  Assessment and Plan:   1.  Lymphoma:  - He had 6 cycles of bendamustine and Rituxan completed in October, 2010. He had 2 years of maintenance Rituxan therapy finishing in September, 2012.  - Second course of bendamustine plus rituximab started February 20, 2017. Cycle 6 was completed on July 11, 2017. Then had maintenance rituximab through January, 2019.  - O-CHOP started at second relapse. Cycle 1 given April 23, 2019. Cycle 6 given August 16, 2019.  Haplo NAM-NK therapy at [facility] September 1, 2020  - Iidelalisib started October 21, 2020 [facility]. 150 mg twice daily.      - Eligible to proceed with therapy with .  stopped Idelalisib - last dose was on 6/10 at 8am. Half life is 8 1/2 hours - x 5 = 42 hours. Plan was to begin LD chemotherapy 6/11 and receive  cells on 6/16, but since he has a new fever on 6/11 the plan was to hold off through the weekend.  LDH trending up so should not postpone chemo  too long. Uric Acid check is normal    2.  HEME:  No need for transfusions today    3.  :   Initial radical prostatectomy and bilateral lymph node dissection. November 15, 2017. Prostatic adenocarcinoma [place] 4+3 = 7 with tertiary pattern 5. Tumor involves 45% of total surface area. Positive for extensive extraprostatic extension. Positive for bilateral seminal vesicle invasion. Multiple margins positive. Lymphovascular invasion not identified. Perineural invasion was noted. Lymph nodes negative. 3 were examined (2 right obturator and 1 left obturator).    Completed radiation to the prostate fossa and pelvic lymph nodes at [facility] early May 2018. He received 4500 cGy in 25 fractions. He then had 2340 cGy boost in 13 fractions to the prostatic fossa, for a total dose of 6240 cGy in 38 treatment fractions delivered over 54 days. He did not receive hormonal therapy:      4.  ID:  Febrile yesterday. Cultures negative for blood and CXR clear.  - Send blood cx, RVP & repeat Covid.  COVID negative. RVP + for Rhinovirus  - Rocephin 2gm daily through the weekend and if negative consider start Monday 6/14    5.  FEN/Renal:  Jump in creatinine to 1.48 from 1.15. Not drinking great. Will give 1L NS today and check uric acid. If uric acid elevated will give Rasburicase.    Final Plan:  - Rocephin  - 1L NS  - RTC tomorrow     Rosenda Bentley MD            Again, thank you for allowing me to participate in the care of your patient.        Sincerely,        BMT DOM

## 2021-06-12 NOTE — TELEPHONE ENCOUNTER
Patient calls in today stating that he met with Dr. Corral at the [facility] BMT clinic last week.  He is wondering if Dr. Corral and Dr. Crooks have had a chance to talk.  He was told that Dr. Crooks will be prescribing a pill for him to take on a monthly basis.  He wants to know if this medication is available.  Per the patient, Dr. Corral talked to the patient about this medication and should not need a follow-up visit with Dr. Crooks at this time.  I let him know that Dr. Crooks is at another location but I was able to let him know that the 2 doctors have talked.  I told Juvenal that I will for sure follow-up with him tomorrow after speaking further with Dr. Crooks and our pharmacy liaison about the medication.  Patient verbalized understanding.    Estrella Vargas RN

## 2021-06-13 ENCOUNTER — APPOINTMENT (OUTPATIENT)
Dept: LAB | Facility: CLINIC | Age: 56
End: 2021-06-13
Attending: INTERNAL MEDICINE
Payer: COMMERCIAL

## 2021-06-13 ENCOUNTER — INFUSION THERAPY VISIT (OUTPATIENT)
Dept: TRANSPLANT | Facility: CLINIC | Age: 56
End: 2021-06-13
Payer: COMMERCIAL

## 2021-06-13 ENCOUNTER — ONCOLOGY VISIT (OUTPATIENT)
Dept: TRANSPLANT | Facility: CLINIC | Age: 56
End: 2021-06-13
Attending: INTERNAL MEDICINE
Payer: COMMERCIAL

## 2021-06-13 VITALS
SYSTOLIC BLOOD PRESSURE: 124 MMHG | OXYGEN SATURATION: 100 % | RESPIRATION RATE: 16 BRPM | WEIGHT: 248.5 LBS | DIASTOLIC BLOOD PRESSURE: 67 MMHG | TEMPERATURE: 98.4 F | HEART RATE: 85 BPM | BODY MASS INDEX: 37.23 KG/M2

## 2021-06-13 DIAGNOSIS — R52 PAIN: ICD-10-CM

## 2021-06-13 DIAGNOSIS — C82.00 FOLLICULAR LYMPHOMA GRADE I, UNSPECIFIED BODY REGION (H): ICD-10-CM

## 2021-06-13 DIAGNOSIS — A04.72 C. DIFFICILE COLITIS: ICD-10-CM

## 2021-06-13 DIAGNOSIS — R19.7 DIARRHEA, UNSPECIFIED TYPE: Primary | ICD-10-CM

## 2021-06-13 DIAGNOSIS — C82.08 FOLLICULAR LYMPHOMA GRADE I, LYMPH NODES OF MULTIPLE SITES (H): Primary | ICD-10-CM

## 2021-06-13 DIAGNOSIS — C82.08 FOLLICULAR LYMPHOMA GRADE I, LYMPH NODES OF MULTIPLE SITES (H): ICD-10-CM

## 2021-06-13 LAB
ANION GAP SERPL CALCULATED.3IONS-SCNC: 3 MMOL/L (ref 3–14)
ANISOCYTOSIS BLD QL SMEAR: SLIGHT
BASOPHILS # BLD AUTO: 0 10E9/L (ref 0–0.2)
BASOPHILS NFR BLD AUTO: 0 %
BUN SERPL-MCNC: 13 MG/DL (ref 7–30)
C DIFF TOX B STL QL: POSITIVE
CALCIUM SERPL-MCNC: 9.2 MG/DL (ref 8.5–10.1)
CHLORIDE SERPL-SCNC: 103 MMOL/L (ref 94–109)
CO2 SERPL-SCNC: 27 MMOL/L (ref 20–32)
CREAT SERPL-MCNC: 1.35 MG/DL (ref 0.66–1.25)
DIFFERENTIAL METHOD BLD: ABNORMAL
EOSINOPHIL # BLD AUTO: 0.6 10E9/L (ref 0–0.7)
EOSINOPHIL NFR BLD AUTO: 5 %
ERYTHROCYTE [DISTWIDTH] IN BLOOD BY AUTOMATED COUNT: 15.9 % (ref 10–15)
GFR SERPL CREATININE-BSD FRML MDRD: 58 ML/MIN/{1.73_M2}
GLUCOSE SERPL-MCNC: 171 MG/DL (ref 70–99)
HCT VFR BLD AUTO: 29.4 % (ref 40–53)
HGB BLD-MCNC: 9.5 G/DL (ref 13.3–17.7)
LYMPHOCYTES # BLD AUTO: 5.4 10E9/L (ref 0.8–5.3)
LYMPHOCYTES NFR BLD AUTO: 42.1 %
MAGNESIUM SERPL-MCNC: 1.8 MG/DL (ref 1.6–2.3)
MCH RBC QN AUTO: 28.8 PG (ref 26.5–33)
MCHC RBC AUTO-ENTMCNC: 32.3 G/DL (ref 31.5–36.5)
MCV RBC AUTO: 89 FL (ref 78–100)
MONOCYTES # BLD AUTO: 1.1 10E9/L (ref 0–1.3)
MONOCYTES NFR BLD AUTO: 8.3 %
NEUTROPHILS # BLD AUTO: 5.7 10E9/L (ref 1.6–8.3)
NEUTROPHILS NFR BLD AUTO: 44.6 %
PLATELET # BLD AUTO: 72 10E9/L (ref 150–450)
PLATELET # BLD EST: ABNORMAL 10*3/UL
POIKILOCYTOSIS BLD QL SMEAR: SLIGHT
POLYCHROMASIA BLD QL SMEAR: SLIGHT
POTASSIUM SERPL-SCNC: 4 MMOL/L (ref 3.4–5.3)
RBC # BLD AUTO: 3.3 10E12/L (ref 4.4–5.9)
SODIUM SERPL-SCNC: 132 MMOL/L (ref 133–144)
SPECIMEN SOURCE: ABNORMAL
WBC # BLD AUTO: 12.8 10E9/L (ref 4–11)

## 2021-06-13 PROCEDURE — 99214 OFFICE O/P EST MOD 30 MIN: CPT

## 2021-06-13 PROCEDURE — 250N000011 HC RX IP 250 OP 636: Performed by: INTERNAL MEDICINE

## 2021-06-13 PROCEDURE — 250N000009 HC RX 250: Performed by: NURSE PRACTITIONER

## 2021-06-13 PROCEDURE — 250N000011 HC RX IP 250 OP 636: Performed by: NURSE PRACTITIONER

## 2021-06-13 PROCEDURE — 87493 C DIFF AMPLIFIED PROBE: CPT | Performed by: INTERNAL MEDICINE

## 2021-06-13 PROCEDURE — 999N000104 HC STATISTIC NO CHARGE

## 2021-06-13 PROCEDURE — 80048 BASIC METABOLIC PNL TOTAL CA: CPT | Performed by: INTERNAL MEDICINE

## 2021-06-13 PROCEDURE — G0463 HOSPITAL OUTPT CLINIC VISIT: HCPCS

## 2021-06-13 PROCEDURE — 83735 ASSAY OF MAGNESIUM: CPT | Performed by: INTERNAL MEDICINE

## 2021-06-13 PROCEDURE — 85025 COMPLETE CBC W/AUTO DIFF WBC: CPT | Performed by: INTERNAL MEDICINE

## 2021-06-13 RX ORDER — LORAZEPAM 0.5 MG/1
0.5 TABLET ORAL
Qty: 15 TABLET | Refills: 0 | Status: SHIPPED | OUTPATIENT
Start: 2021-06-13 | End: 2021-07-03

## 2021-06-13 RX ORDER — HEPARIN SODIUM (PORCINE) LOCK FLUSH IV SOLN 100 UNIT/ML 100 UNIT/ML
5 SOLUTION INTRAVENOUS ONCE
Status: COMPLETED | OUTPATIENT
Start: 2021-06-13 | End: 2021-06-13

## 2021-06-13 RX ORDER — OXYCODONE HYDROCHLORIDE 5 MG/1
5 CAPSULE ORAL EVERY 12 HOURS PRN
Qty: 15 CAPSULE | Refills: 0 | Status: SHIPPED | OUTPATIENT
Start: 2021-06-13 | End: 2021-06-29

## 2021-06-13 RX ORDER — VANCOMYCIN HYDROCHLORIDE 125 MG/1
125 CAPSULE ORAL 4 TIMES DAILY
Qty: 40 CAPSULE | Refills: 0 | Status: ON HOLD | OUTPATIENT
Start: 2021-06-13 | End: 2021-06-27

## 2021-06-13 RX ORDER — CEFTRIAXONE SODIUM 2 G
2 VIAL (EA) INJECTION EVERY 24 HOURS
Status: DISCONTINUED | OUTPATIENT
Start: 2021-06-13 | End: 2021-06-13 | Stop reason: HOSPADM

## 2021-06-13 RX ORDER — CEFTRIAXONE SODIUM 2 G
2 VIAL (EA) INJECTION EVERY 24 HOURS
Status: CANCELLED
Start: 2021-06-14

## 2021-06-13 RX ADMIN — Medication 5 ML: at 08:27

## 2021-06-13 RX ADMIN — CEFTRIAXONE SODIUM 2 G: 2 INJECTION, POWDER, FOR SOLUTION INTRAMUSCULAR; INTRAVENOUS at 09:24

## 2021-06-13 ASSESSMENT — PAIN SCALES - GENERAL: PAINLEVEL: SEVERE PAIN (6)

## 2021-06-13 NOTE — NURSING NOTE
"Oncology Rooming Note    June 13, 2021 8:38 AM   Juvenal Balke is a 55 year old male who presents for:    Chief Complaint   Patient presents with     Port Draw     Labs drawn via port by rn in lab. VS taken.     RECHECK     provider, scheduled infusion for follicular lymphoma     Initial Vitals: /67 (BP Location: Right arm, Patient Position: Sitting, Cuff Size: Adult Regular)   Pulse 85   Temp 98.4  F (36.9  C) (Oral)   Resp 16   Wt 112.7 kg (248 lb 8 oz)   SpO2 100%   BMI 37.23 kg/m   Estimated body mass index is 37.23 kg/m  as calculated from the following:    Height as of 6/4/21: 1.74 m (5' 8.5\").    Weight as of this encounter: 112.7 kg (248 lb 8 oz). Body surface area is 2.33 meters squared.  Severe Pain (6) Comment: Data Unavailable   No LMP for male patient.  Allergies reviewed: Yes  Medications reviewed: Yes    Medications: Medication refills not needed today.  Pharmacy name entered into [employer]: [id number] - [place], [place] - [address] AT [facility]    Clinical concerns: Patient denies fevers.  Patient continues to have nausea/emesis and abdominal bloating (rating the abdominal discomfort at 6/10.)  Patient started having liquid diarrhea this morning since arriving at clinic.  Patient has chronic lower back pain rating it a 6/10.  He would like to talk with you about adding some supplements.      ANGELA TEIXEIRA RN              "

## 2021-06-13 NOTE — PROGRESS NOTES
BMT Clinic Note   Jun 13, 2021      Juvenal Blake is a 55 year old year old male diagnosed with NHL.  He has been treated with Bendamustine + Rituxan then Bendamustine +  Rituxan once again the R-CHOP followed by Nam-NK cell infusion. He is enrolled in protocol [id number], which utilizes Cy/Flu as a lymphodepletion regimen as bridge to Yescarta.    HPI:  Juvenal came in Friday and had temp to 101 with chills.& achiness.  Yesterday noted an ongoing cough but no further fevers. Today he notes again no fevers. Energy low. Notes abdominal fullness and pain making it difficult to sleep and only can eat small amounts of food. Some mild diarrhea. No chest pain. No SOB. Cough a little better. No bleeding. Drinking fluids better    Review of Systems: 8 point ROS negative except as noted above.    Physical Exam:   There were no vitals taken for this visit.  General: Non-toxic. Conversant. KPS 70-80  Eyes: : JOE, sclera anicteric   Lungs: CTA bilaterally. No rhonchi today  Cardiovascular: RRR, no M/R/G   Abdominal/Rectal: +protuberant, firm to palpation. + BS  Lymphatics: no edema  Skin: no rashes or petechaie  Neuro: A&O   Additional Findings: Jacobs site NT, no drainage.  Labs:  Results for JUVENAL BLAKE (MRN [id number]) as of 6/13/2021 09:09   Ref. Range 6/13/2021 08:33   Sodium Latest Ref Range: 133 - 144 mmol/L 132 (L)   Potassium Latest Ref Range: 3.4 - 5.3 mmol/L 4.0   Chloride Latest Ref Range: 94 - 109 mmol/L 103   Carbon Dioxide Latest Ref Range: 20 - 32 mmol/L 27   Urea Nitrogen Latest Ref Range: 7 - 30 mg/dL 13   Creatinine Latest Ref Range: 0.66 - 1.25 mg/dL 1.35 (H)   GFR Estimate Latest Ref Range: >60 mL/min/1.73_m2 58 (L)   GFR Estimate If Black Latest Ref Range: >60 mL/min/1.73_m2 67   Calcium Latest Ref Range: 8.5 - 10.1 mg/dL 9.2   Anion Gap Latest Ref Range: 3 - 14 mmol/L 3   Magnesium Latest Ref Range: 1.6 - 2.3 mg/dL 1.8   Glucose Latest Ref Range: 70 - 99 mg/dL 171 (H)   WBC Latest Ref Range: 4.0 -  11.0 10e9/L 12.8 (H)   Hemoglobin Latest Ref Range: 13.3 - 17.7 g/dL 9.5 (L)   Hematocrit Latest Ref Range: 40.0 - 53.0 % 29.4 (L)   Platelet Count Latest Ref Range: 150 - 450 10e9/L 72 (L)   RBC Count Latest Ref Range: 4.4 - 5.9 10e12/L 3.30 (L)   MCV Latest Ref Range: 78 - 100 fl 89   MCH Latest Ref Range: 26.5 - 33.0 pg 28.8   MCHC Latest Ref Range: 31.5 - 36.5 g/dL 32.3   RDW Latest Ref Range: 10.0 - 15.0 % 15.9 (H)     6/11/21 Blood cultures: NGTD    6/11/21 CXR: clear  Assessment and Plan:   1.  Lymphoma:  - He had 6 cycles of bendamustine and Rituxan completed in October, 2010. He had 2 years of maintenance Rituxan therapy finishing in September, 2012.  - Second course of bendamustine plus rituximab started February 20, 2017. Cycle 6 was completed on July 11, 2017. Then had maintenance rituximab through January, 2019.  - O-CHOP started at second relapse. Cycle 1 given April 23, 2019. Cycle 6 given August 16, 2019.  [facility]  - Iidelalisib started October 21, 2020 [facility]. 150 mg twice daily.      - Eligible to proceed with therapy with .  stopped Idelalisib - last dose was on 6/10 at 8am. Half life is 8 1/2 hours - x 5 = 42 hours. Plan was to begin LD chemotherapy 6/11 and receive  cells on 6/16, but since he has a new fever on 6/11 the plan was to hold off through the weekend.  LDH trending up so should not postpone chemo too long. Uric Acid check is normal    No further fevers so should be ok to start LD chemo on Tuesday - Thursday -- this is scheduled. Needs to get started as I think his abdominal pain/firmness is from the progressive lymphoma    2.  HEME:  No need for transfusions today. WBC coming down    3.  :   Initial radical prostatectomy and bilateral lymph node dissection. November 15, 2017. Prostatic adenocarcinoma [place] 4+3 = 7 with tertiary pattern 5. Tumor involves 45% of total surface area. Positive for extensive extraprostatic  extension. Positive for bilateral seminal vesicle invasion. Multiple margins positive. Lymphovascular invasion not identified. Perineural invasion was noted. Lymph nodes negative. 3 were examined (2 right obturator and 1 left obturator).    Completed radiation to the prostate fossa and pelvic lymph nodes at [facility] early May 2018. He received 4500 cGy in 25 fractions. He then had 2340 cGy boost in 13 fractions to the prostatic fossa, for a total dose of 6240 cGy in 38 treatment fractions delivered over 54 days. He did not receive hormonal therapy:      4.  ID:  Febrile Friday. Cultures negative for blood and CXR clear.  - Send blood cx, RVP & repeat Covid.  COVID negative. RVP + for Rhinovirus  - Rocephin 2gm daily through the weekend -- will stop after today's dose given negative cutures  - diarrhea: check c.dif    5.  FEN/Renal:  Jump in creatinine to 1.48 yesterday but better today with 1L NS yesterday    6. Abdominal Pain secondary to cancer: gave script for oxycodone 15 tabs and lorazepam 15 tabs for difficulty sleeping. Instructed not to take both together due to sedation    Final Plan:  - Rocephin final dose  - RTC Tuesday to start LD chemo    Rosenda Bentley MD  Addendum:  - C.dif +. Start oral vanco 125 QID. Called [facility] with this info and faxed script    Rosenda Bentley MD

## 2021-06-13 NOTE — PROGRESS NOTES
Infusion Nursing Note:  Juvenal Blake presents today for scheduled infusion.    Patient seen by provider today: Yes: Dr. Bentley   present during visit today: Not Applicable.    Note: Labs were monitored.  Stool sample was collected and sent to lab.  Patient declined the need for pain intervention today.  He is currently on a pain regimen at home.  Provider did assess his pain.    Intravenous Access:  Implanted Port.    Treatment Conditions:  Patient received scheduled IV push Rocephin.      Post Infusion Assessment:  Patient tolerated infusion without incident.       Discharge Plan:   Patient discharged in stable condition accompanied by: wife.      ANGELA TEIXEIRA RN

## 2021-06-13 NOTE — PROGRESS NOTES
[facility] Hematology and Oncology Progress Note    Patient: Juvenal Blake  MRN: [id number]  Date of Service: November 13, 2020      Assessment and Plan:    1.  Follicular lymphoma: He started idelalisib just over 3 weeks ago.  He had a CT scan performed few days ago.  Results were reviewed.  There has been improvement in all areas of previous disease when compared to his imaging from September.  He has also noted improvement clinically.  He is tolerating the treatment well.  This will be continued.  He follows up at the [facility] in about 2-1/2 weeks.  I believe he is having some more imaging done at that time.  He is being considered for enrollment in another clinical trial.  Long-term plan will be pending the results of that visit.  We will make a follow-up appointment for us in about 6 weeks.    2.  Prostate cancer:  PSA level is very slowly increasing.  I explained to Juvenal that we will just continue to watch the PSA given his advanced follicular lymphoma.  We typically would not intervene unless the PSA was significantly higher than its current level.    3.  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  He has been having some more problems with sinusitis so we reinitiated at 0.6 g/kg monthly.on November 9, 2020.  He follows up routinely with ENT.    4.  Urinary incontinence: We decreased his Detrol when he started the idelalisib.  If he is having more symptoms we will increase that back up to 4 mg daily.    ECOG Performance   ECOG Performance Status: 0    Distress Assessment  Distress Assessment Score: 1    Pain  Currently in Pain: No/denies    Diagnosis:    1.  Follicular lymphoma: Diagnosed in April, 2010 . Stage IV diagnosis with bone marrow involvement.  Initial disease involved the cervical, supraclavicular, axillary, mesenteric mass, retroperitoneal nodes, and possibly the manubrium.  Relapse noted on imaging in  December 2016.  Axillary node biopsy from February 2017 shows low-grade follicle center cell lymphoma.  Second relapse on imaging January 2019.  Repeat biopsy January 7, 2019 of a right medial thigh lymph node shows recurrent follicular lymphoma.  Grade 2.  Third relapse on imaging in April, 202. Pathologic confirmation May 28, 2020 from a right axillary lymph node excisional biopsy..     2.  Prostate cancer:  Pathologic stage T3b prostate cancer.  Positive margins, multiple, and surgery.  3 lymph nodes were negative.  High risk for recurrent disease.  He is being followed at [facility].  [place] 4+3 = 7.  Tertiary Tofte pattern 5 and preoperative PSA of 27.     3.  Hypogammaglobulinemia: He has received intermittent doses of IVIG.    Treatment:    Lymphoma:    He had 6 cycles of bendamustine and Rituxan completed in October, 2010.  He had 2 years of maintenance Rituxan therapy finishing in September, 2012.    Second course of bendamustine plus rituximab started February 20, 2017.  Cycle 6 was completed on July 11, 2017.  Then had maintenance rituximab through January, 2019.    O-CHOP started at second relapse.  Cycle 1 given April 23, 2019.  Cycle 6 given August 16, 2019.    Haplo NAM-NK therapy at [facility] September 1, 2020     idelalisib started October 21, 2020.          Prostate:   Initial radical prostatectomy and bilateral lymph node dissection.  November 15, 2017.  Prostatic adenocarcinoma [place] 4+3 = 7 with tertiary pattern 5.  Tumor involves 45% of total surface area.  Positive for extensive extraprostatic extension.  Positive for bilateral seminal vesicle invasion.  Multiple margins positive.  Lymphovascular invasion not identified.  Perineural invasion was noted. Lymph nodes negative. 3 were examined (2 right obturator and 1 left obturator).    Completed radiation to the prostate fossa and pelvic lymph nodes at [facility] early May 2018.  He received 4500 cGy in 25 fractions.  He then  had 2340 cGy boost in 13 fractions to the prostatic fossa, for a total dose of 6240 cGy in 38 treatment fractions delivered over 54 days.  He did not receive hormonal therapy.    Interim History:    Juvenal returns today for follow-up visit.  He started idelalisib on October 21.  He has been tolerating things generally well.  Denies any significant shortness of breath, fatigue, rash, or diarrhea.  He has occasional queasy or nausea feeling in the stomach.  He notes that the palpable nodes in the right groin have decreased.  He is having some more frequent urinary incontinence and is requesting to go back on the 4 mg Detrol dose.    Review of Systems:    Constitutional  Constitutional (WDL): Exceptions to WDL  Fatigue: Fatigue relieved by rest  Neurosensory  Neurosensory (WDL): All neurosensory elements are within defined limits  Cardiovascular  Cardiovascular (WDL): Exceptions to WDL  Edema: Yes  Edema Limbs: 5 - 10% inter-limb discrepancy in volume or circumference at point of greatest visible difference, swelling or obscuration of anatomic architecture on close inspection(ankles - improved)  Pulmonary  Respiratory (WDL): Exceptions to WDL  Cough: Mild symptoms, nonprescription intervention indicated  Gastrointestinal  Gastrointestinal (WDL): All gastrointestinal elements are within defined limits  Genitourinary  Genitourinary (WDL): All genitourinary elements are within defined limits  Integumentary  Integumentary (WDL): All integumentary elements are within defined limits  Patient Coping  Patient Coping: Accepting  Accompanied by  Accompanied by: Alone    Past History:    Past Medical History:   Diagnosis Date     Arthritis     shoulder per H & P     GERD (gastroesophageal reflux disease)      H/O pyloric stenosis     as an infant per H & P      Inguinal hernia     per H & P      Lazy eye     per H & P      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 Nontoxic and in no distress.   HEENT: Head: atraumatic, normocephalic. Sclerae anicteric.  Chest:  Normal respiratory effort  Cardiac:  No edema.   Abdomen: abdomen is non-distended  Extremities: normal tone and muscle bulk.  Skin: no lesions or rash. Warm and dry.   CNS: alert and oriented. Grossly non-focal.   Psychiatric: normal mood and affect.     Lab Results:    Recent Results (from the past 240 hour(s))   LD(LDH)    Collection Time: 11/09/20  8:59 AM   Result Value Ref Range    LD (LDH) 249 (H) 125 - 220 U/L   Comprehensive Metabolic Panel    Collection Time: 11/09/20  8:59 AM   Result Value Ref Range    Sodium 139 136 - 145 mmol/L    Potassium 4.3 3.5 - 5.0 mmol/L    Chloride 104 98 - 107 mmol/L    CO2 25 22 - 31 mmol/L    Anion Gap, Calculation 10 5 - 18 mmol/L    Glucose 122 70 - 125 mg/dL    BUN 12 8 - 22 mg/dL    Creatinine 1.00 0.70 - 1.30 mg/dL    GFR MDRD Af Amer >60 >60 mL/min/1.73m2    GFR MDRD Non Af Amer >60 >60 mL/min/1.73m2    Bilirubin, Total 0.3 0.0 - 1.0 mg/dL    Calcium 8.4 (L) 8.5 - 10.5 mg/dL    Protein, Total 6.1 6.0 - 8.0 g/dL    Albumin 3.8 3.5 - 5.0 g/dL    Alkaline Phosphatase 74 45 - 120 U/L    AST 19 0 - 40 U/L    ALT 29 0 - 45 U/L   Uric Acid    Collection Time: 11/09/20  8:59 AM   Result Value Ref Range    Uric Acid 2.0 (L) 3.0 - 8.0 mg/dL   Phosphorus    Collection Time: 11/09/20  8:59 AM   Result Value Ref Range    Phosphorus 2.8 2.5 - 4.5 mg/dL   PSA, Diagnostic (Prostatic-Specific Antigen)    Collection Time: 11/09/20  8:59 AM   Result Value Ref Range    PSA 0.5 0.0 - 3.5 ng/mL   HM1 (CBC with Diff)    Collection Time: 11/09/20  9:00 AM   Result Value Ref Range    WBC 4.4 4.0 - 11.0 thou/uL    RBC 4.09 (L) 4.40  - 6.20 mill/uL    Hemoglobin 12.0 (L) 14.0 - 18.0 g/dL    Hematocrit 36.9 (L) 40.0 - 54.0 %    MCV 90 80 - 100 fL    MCH 29.3 27.0 - 34.0 pg    MCHC 32.5 32.0 - 36.0 g/dL    RDW 13.8 11.0 - 14.5 %    Platelets 161 140 - 440 thou/uL    MPV 9.8 8.5 - 12.5 fL    Neutrophils % 50 50 - 70 %    Lymphocytes % 34 20 - 40 %    Monocytes % 11 (H) 2 - 10 %    Eosinophils % 2 0 - 6 %    Basophils % 1 0 - 2 %    Immature Granulocyte % 2 (H) <=0 %    Neutrophils Absolute 2.2 2.0 - 7.7 thou/uL    Lymphocytes Absolute 1.5 0.8 - 4.4 thou/uL    Monocytes Absolute 0.5 0.0 - 0.9 thou/uL    Eosinophils Absolute 0.1 0.0 - 0.4 thou/uL    Basophils Absolute 0.0 0.0 - 0.2 thou/uL    Immature Granulocyte Absolute 0.1 (H) <=0.0 thou/uL   POCT Glucose    Collection Time: 11/11/20  9:35 AM    Specimen: Capillary; Blood   Result Value Ref Range    Glucose 106 70 - 139 mg/dL      Imaging:    PET/CT personally reviewed.  Continues to show active lymphadenopathy above and below the diaphragm.    Nm Pet Ct Skull To Mid Thigh    Result Date: 11/11/2020  EXAM: NM PET CT SKULL TO MID THIGH LOCATION: [facility] DATE/TIME: 11/11/2020 11:23 AM INDICATION: Subsequent treatment planning and restaging for follicular lymphoma grade I-II, lymph nodes of multiple sites. Status post chemotherapy, currently receiving immunotherapy. Monitor treatment response COMPARISON: FDG PET/CT dated 09/29/2020. TECHNIQUE: Serum glucose level 106 mg/dL. One hour post intravenous administration of 12.6 mCi F-18 FDG, PET imaging was performed from the skull base to the knees utilizing attenuation correction with concurrent axial CT and PET/CT image fusion. Dose reduction techniques were used. FINDINGS: Decreased metabolic activity in the right supraclavicular (max SUV 8.4, previously 12.6), right subpectoral (max SUV 7.6, previously 14.4), right axillary (max SUV 7.9, previously 12.1), right upper paratracheal station 2R (max SUV 6.3, previously 9.5),  paraspinal (Max SUV 3.1, previously 6.3), retrocrural (max SUV 5.9, previously 14.5), bilateral retroperitoneal (max SUV 8.4, previously 16.5), and enteric (max SUV 4.4, previously 8.3), bilateral common iliac (max SUV 10.4, previously 19.0), bilateral lateral psoas (max SUV 5.0, previously 12.4), right external iliac (max SUV 9.3, previously 14.2), and right inguinal (max SUV 9.3, previously 16.6) lymph nodes with decreased size and metabolic activity of the splenic parenchyma measuring 10.3 cm in craniocaudal dimension (max SUV 3.3, previously measured 14.4 cm with a max SUV of 5.0), which is now below liver background (max SUV 4.5) suggesting partial response to therapy (Deauville 4).  Mild senescent intracranial changes. Right chest port with tip terminating near the superior cavoatrial junction. Mild coronary artery calcium. Bilateral renal cysts. Prostatectomy. Pelvic phleboliths. Remote left posterior sixth rib fracture. Multilevel degenerative changes of the spine.     Partial response to therapy with persistently hypermetabolic lymph nodes above and below the diaphragm (Deauville 4).      Signed by: Rob Crooks MD

## 2021-06-13 NOTE — LETTER
6/13/2021         RE: Juvenal Blake  [address] [place] [address]        Dear Colleague,    Thank you for referring your patient, Juvenal Blake, to the [facility] BLOOD AND MARROW TRANSPLANT PROGRAM [place]. Please see a copy of my visit note below.    Infusion Nursing Note:  Juvenal Blake presents today for scheduled infusion.    Patient seen by provider today: Yes: Dr. Bentley   present during visit today: Not Applicable.    Note: Labs were monitored.  Stool sample was collected and sent to lab.  Patient declined the need for pain intervention today.  He is currently on a pain regimen at home.  Provider did assess his pain.    Intravenous Access:  Implanted Port.    Treatment Conditions:  Patient received scheduled IV push Rocephin.      Post Infusion Assessment:  Patient tolerated infusion without incident.       Discharge Plan:   Patient discharged in stable condition accompanied by: wife.      AGNELA TEIXEIRA, VALERIE                          Again, thank you for allowing me to participate in the care of your patient.        Sincerely,        [facility]

## 2021-06-13 NOTE — LETTER
6/13/2021         RE: Juvenal Blake  [address] [place] [address]        Dear Colleague,    Thank you for referring your patient, Juvenal Blake, to the [facility] BLOOD AND MARROW TRANSPLANT PROGRAM [place]. Please see a copy of my visit note below.    BMT Clinic Note   Jun 13, 2021      Juvenal Blake is a 55 year old year old male diagnosed with NHL.  He has been treated with Bendamustine + Rituxan then Bendamustine +  Rituxan once again the R-CHOP followed by Nam-NK cell infusion. He is enrolled in protocol [id number], which utilizes Cy/Flu as a lymphodepletion regimen as bridge to [place].    HPI:  Juvenal came in Friday and had temp to 101 with chills.& achiness.  Yesterday noted an ongoing cough but no further fevers. Today he notes again no fevers. Energy low. Notes abdominal fullness and pain making it difficult to sleep and only can eat small amounts of food. Some mild diarrhea. No chest pain. No SOB. Cough a little better. No bleeding. Drinking fluids better    Review of Systems: 8 point ROS negative except as noted above.    Physical Exam:   There were no vitals taken for this visit.  General: Non-toxic. Conversant. KPS 70-80  Eyes: : JOE, sclera anicteric   Lungs: CTA bilaterally. No rhonchi today  Cardiovascular: RRR, no M/R/G   Abdominal/Rectal: +protuberant, firm to palpation. + BS  Lymphatics: no edema  Skin: no rashes or petechaie  Neuro: A&O   Additional Findings: Jacobs site NT, no drainage.  Labs:  Results for JUVENAL BLAKE (MRN [id number]) as of 6/13/2021 09:09   Ref. Range 6/13/2021 08:33   Sodium Latest Ref Range: 133 - 144 mmol/L 132 (L)   Potassium Latest Ref Range: 3.4 - 5.3 mmol/L 4.0   Chloride Latest Ref Range: 94 - 109 mmol/L 103   Carbon Dioxide Latest Ref Range: 20 - 32 mmol/L 27   Urea Nitrogen Latest Ref Range: 7 - 30 mg/dL 13   Creatinine Latest Ref Range: 0.66 - 1.25 mg/dL 1.35 (H)   GFR Estimate Latest Ref Range: >60 mL/min/1.73_m2 58 (L)   GFR  Estimate If Black Latest Ref Range: >60 mL/min/1.73_m2 67   Calcium Latest Ref Range: 8.5 - 10.1 mg/dL 9.2   Anion Gap Latest Ref Range: 3 - 14 mmol/L 3   Magnesium Latest Ref Range: 1.6 - 2.3 mg/dL 1.8   Glucose Latest Ref Range: 70 - 99 mg/dL 171 (H)   WBC Latest Ref Range: 4.0 - 11.0 10e9/L 12.8 (H)   Hemoglobin Latest Ref Range: 13.3 - 17.7 g/dL 9.5 (L)   Hematocrit Latest Ref Range: 40.0 - 53.0 % 29.4 (L)   Platelet Count Latest Ref Range: 150 - 450 10e9/L 72 (L)   RBC Count Latest Ref Range: 4.4 - 5.9 10e12/L 3.30 (L)   MCV Latest Ref Range: 78 - 100 fl 89   MCH Latest Ref Range: 26.5 - 33.0 pg 28.8   MCHC Latest Ref Range: 31.5 - 36.5 g/dL 32.3   RDW Latest Ref Range: 10.0 - 15.0 % 15.9 (H)     6/11/21 Blood cultures: NGTD    6/11/21 CXR: clear  Assessment and Plan:   1.  Lymphoma:  - He had 6 cycles of bendamustine and Rituxan completed in October, 2010. He had 2 years of maintenance Rituxan therapy finishing in September, 2012.  - Second course of bendamustine plus rituximab started February 20, 2017. Cycle 6 was completed on July 11, 2017. Then had maintenance rituximab through January, 2019.  - O-CHOP started at second relapse. Cycle 1 given April 23, 2019. Cycle 6 given August 16, 2019.  Haplo NAM-NK therapy at [facility] September 1, 2020  - Iidelalisib started October 21, 2020 at [facility]. 150 mg twice daily.      - Eligible to proceed with therapy with .  stopped Idelalisib - last dose was on 6/10 at 8am. Half life is 8 1/2 hours - x 5 = 42 hours. Plan was to begin LD chemotherapy 6/11 and receive  cells on 6/16, but since he has a new fever on 6/11 the plan was to hold off through the weekend.  LDH trending up so should not postpone chemo too long. Uric Acid check is normal    No further fevers so should be ok to start LD chemo on Tuesday - Thursday -- this is scheduled. Needs to get started as I think his abdominal pain/firmness is from the progressive lymphoma    2.  HEME:  No need for  transfusions today. WBC coming down    3.  :   Initial radical prostatectomy and bilateral lymph node dissection. November 15, 2017. Prostatic adenocarcinoma [place] 4+3 = 7 with tertiary pattern 5. Tumor involves 45% of total surface area. Positive for extensive extraprostatic extension. Positive for bilateral seminal vesicle invasion. Multiple margins positive. Lymphovascular invasion not identified. Perineural invasion was noted. Lymph nodes negative. 3 were examined (2 right obturator and 1 left obturator).    Completed radiation to the prostate fossa and pelvic lymph nodes at [facility] early May 2018. He received 4500 cGy in 25 fractions. He then had 2340 cGy boost in 13 fractions to the prostatic fossa, for a total dose of 6240 cGy in 38 treatment fractions delivered over 54 days. He did not receive hormonal therapy:      4.  ID:  Febrile Friday. Cultures negative for blood and CXR clear.  - Send blood cx, RVP & repeat Covid.  COVID negative. RVP + for Rhinovirus  - Rocephin 2gm daily through the weekend -- will stop after today's dose given negative cutures  - diarrhea: check c.dif    5.  FEN/Renal:  Jump in creatinine to 1.48 yesterday but better today with 1L NS yesterday    6. Abdominal Pain secondary to cancer: gave script for oxycodone 15 tabs and lorazepam 15 tabs for difficulty sleeping. Instructed not to take both together due to sedation    Final Plan:  - Rocephin final dose  - RTC Tuesday to start LD chemo    Rosenda Bentley MD  Addendum:  - C.dif +. Start oral vanco 125 QID. Called [facility] with this info and faxed script    Rosenda Bentley MD            Again, thank you for allowing me to participate in the care of your patient.        Sincerely,        BMT DOM

## 2021-06-13 NOTE — TELEPHONE ENCOUNTER
Arlen, coordinator from Dr. Corral's office at the [facility] bone marrow transplant clinic, calls in today to let us know that Dr. Corral has reviewed patient's most recent PET scan on 11/11.  They also reviewed Dr. Crooks note from 11/13.  Dr. Corral states that since the patient is responding to the current therapy, there is no need for them to repeat a PET scan and see the patient.  They are reaching out to the patient to let them know this.  I let them know that we have a follow-up in our clinic on 12/23/2024 lab, MD.  They wanted to let us know that the patient will not need to see Dr. Corral unless he progresses.  Then they will see him back at the [facility] for possible clinical trials.  I let them know that this would be sent over to Dr. Crooks as he is on vacation this week.    Estrella Vargas RN

## 2021-06-13 NOTE — TELEPHONE ENCOUNTER
Oral Chemotherapy Monitoring Program    Oral Chemotherapy Monitoring    Date: 11/6/20  Primary Oncologist: Rob Crooks MD  Primary Oncology Clinic:  MEDICAL ONCOLOGY  Cancer Diagnosis: Follicular lymphoma (H)  Drug Name: Idelalisib  Current Dosage: 150 mg BID  Current Frequency: Continuous  Encounter: Mid-Cycle Assessment   New Start:   until disease progression or unacceptable toxcities   Intervention: Adherence/Side Effects, See in clinic      Who was educated?: Patient       Mid-Cycle Assessment:  Start Date of Last Cycle: 10/21/20   Unplanned doses missed in last 2 weeks: 2   Adherence assessment : adherent   Non-adherence intervention recommendation: Has an alarm for the nighttime dose as this one is harder to remember   Drug Interaction Assessment: There were no significant drug interactions identified upon review of medication list with chemotherapy agents.   Adverse Effects reported: 0   Next pharmacist intervention date: 12/23/20   Intervention: R               Assessment/Plan:  Juvenal is tolerating therapy well. Denies at AE. Continue as prescribed.    Follow-Up:  Will plan to review labs and appointment with Dr Crooks on 12/23. Plan to call Juvenal in [facility] then pending how appointment goes.     Cm Diaz, PharmD, BCOP  Oral Chemotherapy Pharmacist  [phone]

## 2021-06-13 NOTE — NURSING NOTE
Chief Complaint   Patient presents with     Port Draw     Labs drawn via port by rn in lab. VS taken.     Port accessed with 20g 3/4 inch gripper needle by RN, labs collected, line flushed with saline and heparin.  Vitals taken. Pt checked in for appointment(s).    Cole Valdez, RN

## 2021-06-13 NOTE — TELEPHONE ENCOUNTER
Juvenal called with questions regarding notarizing his healthcare directive.  He had used a program called Will [employer] to create a healthcare directive (HCD) and last will and testament.  He is looking for a notary.  I let him know there is a service that can assist with a HCD, but they will not with his will.  I also let him know he can have 2 witnesses sign his HCD instead of getting it notarized.  I sent him a [employer] message with this info along with the phone number for [employer], which I believe may be able to help him as well.  He does not need any other assistance at this time.  I encouraged he call me if needed in the future.

## 2021-06-14 ENCOUNTER — COMMUNICATION - HEALTHEAST (OUTPATIENT)
Dept: ADMINISTRATIVE | Facility: HOSPITAL | Age: 56
End: 2021-06-14

## 2021-06-14 ENCOUNTER — TELEPHONE (OUTPATIENT)
Dept: TRANSPLANT | Facility: CLINIC | Age: 56
End: 2021-06-14

## 2021-06-14 DIAGNOSIS — Z11.59 ENCOUNTER FOR SCREENING FOR OTHER VIRAL DISEASES: ICD-10-CM

## 2021-06-14 NOTE — PROGRESS NOTES
Pt here for IGG.  Port accessed with good blood return and IGG given without complications.  Port flushed with NS and heparin and then deaccessed. Site covered with gauze and tape. Pt left stable and knows when to return.

## 2021-06-14 NOTE — PROGRESS NOTES
[facility] Hematology and Oncology Progress Note    Patient: Juvenal Blake  MRN: [id number]  Date of Service: January 20, 2021      Assessment and Plan:    1.  Follicular lymphoma: uJvenal has been on idelalisib now for about 3 months.  Clinically he seems to be tolerating it well.  PET scan images were personally reviewed and he is having an excellent response radiographically.  We will continue his current treatment at the same dose.  We will see him back in clinic in 2 months.  Repeat imaging in 4 months with a PET scan.  Sooner if he has any signs or symptoms  His follow-up with the [facility] is on hold.  He will be referred back there upon progression for clinical trial consideration.    2.  Prostate cancer: PSA will be checked today.  PSA level is very slowly increasing.  I explained to Juvenal that we will just continue to watch the PSA given his advanced follicular lymphoma.  We typically would not intervene unless the PSA was significantly higher than its current level.    3.  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  0.6 g/kg monthly.  He is having recurrent sinusitis.  10-day course of Levaquin will be ordered.    4.  Urinary incontinence: We decreased his Detrol when he started the idelalisib.  If he is having more symptoms we will increase that back up to 4 mg daily.    ECOG Performance   ECOG Performance Status: 0    Distress Assessment  Distress Assessment Score: No distress    Pain  Currently in Pain: No/denies    Diagnosis:    1.  Follicular lymphoma: Diagnosed in April, 2010 . Stage IV diagnosis with bone marrow involvement.  Initial disease involved the cervical, supraclavicular, axillary, mesenteric mass, retroperitoneal nodes, and possibly the manubrium.  Relapse noted on imaging in December 2016.  Axillary node biopsy from February 2017 shows low-grade follicle center cell lymphoma.  Second relapse on imaging January 2019.  Repeat biopsy January  7, 2019 of a right medial thigh lymph node shows recurrent follicular lymphoma.  Grade 2.  Third relapse on imaging in April, 202. Pathologic confirmation May 28, 2020 from a right axillary lymph node excisional biopsy..     2.  Prostate cancer:  Pathologic stage T3b prostate cancer.  Positive margins, multiple, and surgery.  3 lymph nodes were negative.  High risk for recurrent disease.  He is being followed at [facility].  Maricel 4+3 = 7.  Tertiary [place] pattern 5 and preoperative PSA of 27.     3.  Hypogammaglobulinemia: He has received intermittent doses of IVIG.    Treatment:    Lymphoma:    He had 6 cycles of bendamustine and Rituxan completed in October, 2010.  He had 2 years of maintenance Rituxan therapy finishing in September, 2012.    Second course of bendamustine plus rituximab started February 20, 2017.  Cycle 6 was completed on July 11, 2017.  Then had maintenance rituximab through January, 2019.    O-CHOP started at second relapse.  Cycle 1 given April 23, 2019.  Cycle 6 given August 16, 2019.    Haplo NAM-NK therapy at [facility] September 1, 2020     idelalisib started October 21, 2020.  150 mg twice daily.      Prostate:   Initial radical prostatectomy and bilateral lymph node dissection.  November 15, 2017.  Prostatic adenocarcinoma [place] 4+3 = 7 with tertiary pattern 5.  Tumor involves 45% of total surface area.  Positive for extensive extraprostatic extension.  Positive for bilateral seminal vesicle invasion.  Multiple margins positive.  Lymphovascular invasion not identified.  Perineural invasion was noted. Lymph nodes negative. 3 were examined (2 right obturator and 1 left obturator).    Completed radiation to the prostate fossa and pelvic lymph nodes at [facility] early May 2018.  He received 4500 cGy in 25 fractions.  He then had 2340 cGy boost in 13 fractions to the prostatic fossa, for a total dose of 6240 cGy in 38 treatment fractions delivered over 54 days.  He did not  receive hormonal therapy.    Interim History:    Juvenal returns today for follow-up visit.  He started idelalisib on October 21.  He has been tolerating things generally well.  He is having an acute sinus infection as he usually gets during the winter.  No headaches or fevers.  No worsening shortness of breath.  He has occasional diarrhea for which he takes Imodium.  Very minimal skin rash on the arms.    Review of Systems:    Constitutional  Constitutional (WDL): Exceptions to WDL  Fatigue: Fatigue relieved by rest  Neurosensory  Neurosensory (WDL): All neurosensory elements are within defined limits  Cardiovascular  Cardiovascular (WDL): Exceptions to WDL  Edema: Yes  Pulmonary  Respiratory (WDL): Exceptions to WDL  Cough: Mild symptoms, nonprescription intervention indicated  Dyspnea: Shortness of breath with moderate exertion  Gastrointestinal  Gastrointestinal (WDL): All gastrointestinal elements are within defined limits  Genitourinary  Genitourinary (WDL): All genitourinary elements are within defined limits  Integumentary  Integumentary (WDL): All integumentary elements are within defined limits  Patient Coping  Patient Coping: Accepting  Accompanied by  Accompanied by: Alone    Past History:    Past Medical History:   Diagnosis Date     Arthritis     shoulder per H & P     GERD (gastroesophageal reflux disease)      H/O pyloric stenosis     as an infant per H & P      Inguinal hernia     per [facility]      Lazy eye     per [facility]      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 Nontoxic and in no distress.   HEENT: Head: atraumatic, normocephalic. Sclerae anicteric.  Chest:  Normal  respiratory effort.  Clear to auscultation bilaterally.  Cardiac:  No edema.  Regular rate and rhythm with normal appreciable murmur.  Abdomen: abdomen is non-distended  Extremities: normal tone and muscle bulk.  Skin: no lesions or rash. Warm and dry.   CNS: alert and oriented. Grossly non-focal.   Psychiatric: normal mood and affect.   Nodes: No palpable cervical or supraclavicular nodes.    Lab Results:    Recent Results (from the past 240 hour(s))   POCT Glucose    Collection Time: 01/19/21  9:47 AM    Specimen: Capillary; Blood   Result Value Ref Range    Glucose 93 70 - 139 mg/dL   Comprehensive Metabolic Panel    Collection Time: 01/20/21  8:44 AM   Result Value Ref Range    Sodium 141 136 - 145 mmol/L    Potassium 4.1 3.5 - 5.0 mmol/L    Chloride 106 98 - 107 mmol/L    CO2 28 22 - 31 mmol/L    Anion Gap, Calculation 7 5 - 18 mmol/L    Glucose 101 70 - 125 mg/dL    BUN 20 8 - 22 mg/dL    Creatinine 1.00 0.70 - 1.30 mg/dL    GFR MDRD Af Amer >60 >60 mL/min/1.73m2    GFR MDRD Non Af Amer >60 >60 mL/min/1.73m2    Bilirubin, Total 0.5 0.0 - 1.0 mg/dL    Calcium 8.9 8.5 - 10.5 mg/dL    Protein, Total 6.4 6.0 - 8.0 g/dL    Albumin 3.6 3.5 - 5.0 g/dL    Alkaline Phosphatase 57 45 - 120 U/L    AST 16 0 - 40 U/L    ALT 25 0 - 45 U/L   HM1 (CBC with Diff)    Collection Time: 01/20/21  8:44 AM   Result Value Ref Range    WBC 6.1 4.0 - 11.0 thou/uL    RBC 4.56 4.40 - 6.20 mill/uL    Hemoglobin 12.9 (L) 14.0 - 18.0 g/dL    Hematocrit 40.1 40.0 - 54.0 %    MCV 88 80 - 100 fL    MCH 28.3 27.0 - 34.0 pg    MCHC 32.2 32.0 - 36.0 g/dL    RDW 15.5 (H) 11.0 - 14.5 %    Platelets 111 (L) 140 - 440 thou/uL    MPV 9.8 8.5 - 12.5 fL    Neutrophils % 70 50 - 70 %    Lymphocytes % 17 (L) 20 - 40 %    Monocytes % 8 2 - 10 %    Eosinophils % 1 0 - 6 %    Basophils % 0 0 - 2 %    Immature Granulocyte % 3 (H) <=0 %    Neutrophils Absolute 4.2 2.0 - 7.7 thou/uL    Lymphocytes Absolute 1.0 0.8 - 4.4 thou/uL    Monocytes Absolute 0.5 0.0 -  0.9 thou/uL    Eosinophils Absolute 0.1 0.0 - 0.4 thou/uL    Basophils Absolute 0.0 0.0 - 0.2 thou/uL    Immature Granulocyte Absolute 0.2 (H) <=0.0 thou/uL      Imaging:    PET/CT personally reviewed.  Significant improvement in adenopathy when compared to November.  Slight residual noted on the retroperitoneum.    Nm Pet Ct Skull To Mid Thigh    Result Date: 1/19/2021  EXAM: NM PET CT SKULL TO MID THIGH LOC[facility] DATE/TIME: 1/19/2021 11:38 AM INDICATION: Subsequent treatment planning and restaging for follicular lymphoma grade I-II, lymph nodes of multiple sites. Status post chemotherapy, currently receiving immunotherapy. Monitor treatment response COMPARISON: FDG PET/CT dated 11/11/2020. TECHNIQUE: Serum glucose level 93 mg/dL. One hour post intravenous administration of 10.7 mCi F-18 FDG, PET imaging was performed from the skull base to the mid thighs utilizing attenuation correction with concurrent axial CT and PET/CT image fusion. Dose reduction techniques were used. FINDINGS: Residual mildly FDG avid retroperitoneal (Max SUV 4.8, previously 9.5) and right inguinal (max SUV 3.4, previously 9.0) lymph nodes with the retroperitoneal lymph nodes remaining above liver background (max SUV 4.0) suggesting marked interval response to therapy (Deauville 4). The remaining FDG uptake is physiologic. Mild senescent intracranial changes. Mild to moderate paranasal sinus mucosal thickening. Mild carotid artery bifurcation calcific location. Right chest port with tip terminating near the superior cavoatrial junction. Non-FDG avid right upper paratracheal, right axillary, and right retrocrural lymph nodes. Prostatectomy. Pelvic fluid was. Remote left posteriolateral sixth rib fracture. Multilevel degenerative changes of the spine.     Interval response to therapy with residual lymph nodes below the diaphragm (Deauville 4).      Signed by: Rob Crooks MD

## 2021-06-14 NOTE — PROGRESS NOTES
Juvenal arrived A&OX4 ambulatory and stable, confirms he is here for IVIG treatment. Seated in chair #6.  Patient states that he has a chest and sinus cold. He denies fevers. POC/medication education reviewed, pt stated understanding and agreed to plan. Port accessed with ease, line had excellent blood return, and line easily flushed. VS WNL Patient agreed to a covid test and was sent to lab. Patient tolerated. Patient C/O of muscle cramps in his hands and arms, as well as his calf's. We called the nurse practitioner and we juan a CMP and mag level. His labs were unremarkable. The nurse practitioner  Recommended the patient to take tylenol, drink fluids, and take warm baths.   No pre meds ordered   IVIG 40g infused at titrated rate per protocol. Patient stated that he gets HA's from his transfusion so we left the rate at 200cc/hr. We did not max out at the 4mg/kg/min.  Juvenal tolerated infusion w/o sxs adverse reaction.He had a HA but it was tolerable at that rate.  Line flushed NS and pt monitored 30min post infusion. VSS   port flushed NS20mL, instilled w heparin 6mL 1:100 and gripper needle dc'd.  Patient declined AVS, pt stated understanding and that his needs were met today.  1245 Juvenal katz'd A&Ox4 ambulatory and stable.

## 2021-06-14 NOTE — ANESTHESIA POSTPROCEDURE EVALUATION
Patient: Juvenal Blake  ROBOTIC ASSISTED RADICAL RETROPUBIC PROSTATECTOMY BILATERAL PELVIC LYMPH NODE DISSECTION   Anesthesia type: general    Patient location: PACU  Last vitals:   Vitals:    11/15/17 1230   BP: 171/90   Pulse: 87   Resp: 16   Temp: 36.7  C (98  F)   SpO2: 98%     Post vital signs: stable  Level of consciousness: awake and responds to simple questions  Post-anesthesia pain: pain controlled  Post-anesthesia nausea and vomiting: no  Pulmonary: unassisted, return to baseline  Cardiovascular: stable and blood pressure at baseline  Hydration: adequate  Anesthetic events: no    QCDR Measures:  ASA# 11 - [place]-op Cardiac Arrest: ASA11B - Patient did NOT experience unanticipated cardiac arrest  ASA# 12 - [place]-op Mortality Rate: ASA12B - Patient did NOT die  ASA# 13 - PACU Re-Intubation Rate: ASA13B - Patient did NOT require a new airway mgmt  ASA# 10 - Composite Anes Safety: ASA10A - No serious adverse event    Additional Notes:

## 2021-06-14 NOTE — ANESTHESIA PREPROCEDURE EVALUATION
Anesthesia Evaluation        Airway   Mallampati: II  Neck ROM: full   Pulmonary - negative ROS and normal exam   (+) sleep apnea on CPAP, moderate, a smoker                         Cardiovascular   Exercise tolerance: > or = 4 METS  (-) murmur  Rhythm: regular  Rate: normal,    no murmur      Neuro/Psych - negative ROS     Endo/Other - negative ROS   (+) obesity,      GI/Hepatic/Renal    (+) GERD well controlled,        Other findings: Smoking hx is greater then ten years for tobacco. Current THC.  Previous lymphoma.      Dental - normal exam                        Anesthesia Plan  Planned anesthetic: general endotracheal    ASA 2   Induction: intravenous   Anesthetic plan and risks discussed with: patient and spouse    Post-op plan: routine recovery

## 2021-06-14 NOTE — TELEPHONE ENCOUNTER
Oral Chemotherapy Monitoring Program    Oral Chemotherapy Monitoring    Date: 11/6/20  Primary Oncologist: Rob Crooks MD  Primary Oncology Clinic: [facility]  Cancer Diagnosis: Follicular lymphoma (H)  Drug Name: Idelalisib  Current Dosage: 150 mg BID  Current Frequency: Continuous  Encounter: Mid-Cycle Assessment   New Start:   until disease progression or unacceptable toxcities   Intervention: Adherence/Side Effects, See in clinic      Who was educated?: Patient       Mid-Cycle Assessment:  Start Date of Last Cycle: 1/6/21   Unplanned doses missed in last 2 weeks: 2   Adherence assessment : adherent   Non-adherence intervention recommendation: Has an alarm for the nighttime dose as this one is harder to remember. Recommended that he take the evening dose with dinner instead of at 9A and 9P. Does not have to be exactly 12 hours apart and if easier to remember with dinner then might be able to achieve full adherence.   Drug Interaction Assessment: There were no significant drug interactions identified upon review of medication list with chemotherapy agents.   Adverse Effects reported: 1   Adverse Events and Interventions: Nausea (grade 1): Mild, controlled with prochlorperazine prn. Not every day.   Next pharmacist intervention date: 1/20/21   Intervention: R               Assessment/Plan:  Juvenal is tolerating therapy well with some mild, controlled, intermittent nausea. Continue as prescribed.    Follow-Up:  Will plan to review scan and appt with Dr. Crooks 1/20 and then plan for next assessment phone call to be in Feb.     Cm Diaz, PharmD, BCOP  Oral Chemotherapy Pharmacist  [phone]

## 2021-06-14 NOTE — TELEPHONE ENCOUNTER
"Patient was called to review current plan. He is to come to clinic for labs and provider the 15th tomorrow at 7 am. He was able to repeat this back. He is to continue on Vancomycin as prescribed, he states he started it yesterday. We discussed that the plan is to start LD chemo next week Wednesday 6/23 and get cells the following on 6/28 Monday. He states he is very anxious about this delay and stated he never delayed treatment for infections previously. I reiterated that we need to treat this infection and it be stable and improving prior to starting any therapy.   He was told to \"hold\" the spot on his calendar for an appt with Dr Terry for the 16th at 3:30, he said that he will do this.  Juvenal believes he has a Covid test scheduled this week but could not confirm the day, this should be addressed tomorrow in clinic as he won't need one if that was for his cancelled 5C appt.  He states he is feeling better each day and this is why he is very anxious to not delay his therapy.  Sarah Beth Kessler RN Research Nurse  [phone]  "

## 2021-06-14 NOTE — ANESTHESIA CARE TRANSFER NOTE
Last vitals:   Vitals:    11/15/17 1230   BP: 171/90   Pulse: 87   Resp: 16   Temp: 36.7  C (98  F)   SpO2: 98%     Patient's level of consciousness is drowsy  Spontaneous respirations: yes  Maintains airway independently: yes  Dentition unchanged: yes  Oropharynx: oropharynx clear of all foreign objects    QCDR Measures:  ASA# 20 - Surgical Safety Checklist: WHO surgical safety checklist completed prior to induction  PQRS# 430 - Adult PONV Prevention: 4558F - Pt received => 2 anti-emetic agents (different classes) preop & intraop  ASA# 8 - Peds PONV Prevention: NA - Not pediatric patient, not GA or 2 or more risk factors NOT present  PQRS# 424 - [place]-op Temp Management: 4559F - At least one body temp DOCUMENTED => 35.5C or 95.9F within required timeframe  PQRS# 426 - PACU Transfer Protocol: - Transfer of care checklist used  ASA# 14 - Acute Post-op Pain: ASA14B - Patient did NOT experience pain >= 7 out of 10

## 2021-06-14 NOTE — PROGRESS NOTES
[facility] Hematology and Oncology Progress Note    Patient: Juvenal Blake  MRN: [id number]        Assessment and Plan:    1.  Follicular lymphoma:   He started idelalisib 2 months ago.   Clinically, the palpable right groin mass is smaller since starting. No signs of progression.      He is tolerating the treatment well.  Labwork unremarkable.     Dr. Corral at [facility] recommended continuing current treatment until progression. At time of progression, he can be referred back for consideration of clinical trials at that time.    We will continue current therapy.  Return in 3-4 weeks with PET scan prior.    2.  Prostate cancer:    PSA level is very slowly increasing.  I explained to Juvenal that we will just continue to watch the PSA given his advanced follicular lymphoma.  We typically would not intervene unless the PSA was significantly higher than its current level.    3.  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  He has been having some more problems with sinusitis so we reinitiated at 0.6 g/kg monthly.on November 9, 2020. He will continue with monthly infusions over the winter. He follows up routinely with ENT.    4.  Urinary incontinence:   We decreased his Detrol when he started the idelalisib, but with increased incontinence. He went back to Detrol 4 mg with improvement and no new side effects.     ECOG Performance   ECOG Performance Status: 1    Distress Assessment  Distress Assessment Score: 1    Pain  Currently in Pain: No/denies    Diagnosis:    1.  Follicular lymphoma: Diagnosed in April, 2010 . Stage IV diagnosis with bone marrow involvement.  Initial disease involved the cervical, supraclavicular, axillary, mesenteric mass, retroperitoneal nodes, and possibly the manubrium.  Relapse noted on imaging in December 2016.  Axillary node biopsy from February 2017 shows low-grade follicle center cell lymphoma.  Second  relapse on imaging January 2019.  Repeat biopsy January 7, 2019 of a right medial thigh lymph node shows recurrent follicular lymphoma.  Grade 2.  Third relapse on imaging in April, 202. Pathologic confirmation May 28, 2020 from a right axillary lymph node excisional biopsy..     2.  Prostate cancer:  Pathologic stage T3b prostate cancer.  Positive margins, multiple, and surgery.  3 lymph nodes were negative.  High risk for recurrent disease.  He is being followed at [facility].  Maricel 4+3 = 7.  Tertiary Maricel pattern 5 and preoperative PSA of 27.     3.  Hypogammaglobulinemia: He has received intermittent doses of IVIG.    Treatment:    Lymphoma:    He had 6 cycles of bendamustine and Rituxan completed in October, 2010.  He had 2 years of maintenance Rituxan therapy finishing in September, 2012.    Second course of bendamustine plus rituximab started February 20, 2017.  Cycle 6 was completed on July 11, 2017.  Then had maintenance rituximab through January, 2019.    O-CHOP started at second relapse.  Cycle 1 given April 23, 2019.  Cycle 6 given August 16, 2019.    Haplo NAM-NK therapy at [facility] September 1, 2020     Idelalisib started October 21, 2020.      Prostate cancer:   Initial radical prostatectomy and bilateral lymph node dissection.  November 15, 2017.  Prostatic adenocarcinoma Maricel 4+3 = 7 with tertiary pattern 5.  Tumor involves 45% of total surface area.  Positive for extensive extraprostatic extension.  Positive for bilateral seminal vesicle invasion.  Multiple margins positive.  Lymphovascular invasion not identified.  Perineural invasion was noted. Lymph nodes negative. 3 were examined (2 right obturator and 1 left obturator).    Completed radiation to the prostate fossa and pelvic lymph nodes at [facility] early May 2018.  He received 4500 cGy in 25 fractions.  He then had 2340 cGy boost in 13 fractions to the prostatic fossa, for a total dose of 6240 cGy in 38 treatment  fractions delivered over 54 days.  He did not receive hormonal therapy.    Interim History:    Juvenal returns today for follow-up visit.  He started idelalisib on October 21, two months ago.  He has been tolerating things generally well.  Denies any significant shortness of breath, fatigue, rash, or diarrhea.  He has occasional queasy or nausea feeling in the stomach, treated with peptobismol. He notes that the palpable nodes in the right groin have decreased, no new nodes. No night sweats or change in energy level. His urinary incontinence is better after going back up on his Detrol dose. Chronic sinusitis/chest congestion, no fevers.    Review of Systems:  Constitutional  Constitutional (WDL): Exceptions to WDL  Fatigue: Concerns  Neurosensory  Neurosensory (WDL): All neurosensory elements are within defined limits  Eye   Eye Disorder (WDL): All eye disorder elements are within defined limits  Ear  Ear Disorder (WDL): All ear disorder elements are within defined limits  Cardiovascular  Cardiovascular (WDL): Exceptions to WDL  Edema: Concerns(lower extremities; R groin area)  Pulmonary  Respiratory (WDL): Within Defined Limits  Gastrointestinal  Gastrointestinal (WDL): Exceptions to WDL  Nausea: Concerns(taking pepto bismol)  Genitourinary  Genitourinary (WDL): All genitourinary elements are within defined limits  Lymphatic  Lymph (WDL): All lymph disorder elements are within defined limits  Musculoskeletal and Connective Tissue  Musculoskeletal and Connetive Tissue Disorders (WDL): Exceptions to WDL  Arthralgia: Concerns(bilateral shoulders)  Integumentary  Integumentary (WDL): All integumentary elements are within defined limits  Patient Coping  Patient Coping: Accepting;Open/discussion  Accompanied by  Accompanied by: Alone    Past History:    Past Medical History:   Diagnosis Date     Arthritis     shoulder per H & P     GERD (gastroesophageal reflux disease)      H/O pyloric stenosis     as an infant per H & P   "    Inguinal hernia     per H & P      Lazy eye     per H & P      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 Nontoxic and in no distress.   HEENT: Head: atraumatic, normocephalic. Sclerae anicteric.  Lymph: 3 cm right inguinal soft mass, smaller than prior exam. No other adenopathy.  Chest:  Normal respiratory effort  Cardiac:  No edema.   Abdomen: abdomen is non-distended  Extremities: normal tone and muscle bulk.  Skin: no lesions or rash. Warm and dry.   CNS: alert and oriented. Grossly non-focal.   Psychiatric: normal mood and affect.     Lab Results:    Recent Results (from the past 240 hour(s))   Comprehensive Metabolic Panel    Collection Time: 12/23/20 11:01 AM   Result Value Ref Range    Sodium 140 136 - 145 mmol/L    Potassium 4.0 3.5 - 5.0 mmol/L    Chloride 105 98 - 107 mmol/L    CO2 27 22 - 31 mmol/L    Anion Gap, Calculation 8 5 - 18 mmol/L    Glucose 98 70 - 125 mg/dL    BUN 13 8 - 22 mg/dL    Creatinine 1.06 0.70 - 1.30 mg/dL    GFR MDRD Af Amer >60 >60 mL/min/1.73m2    GFR MDRD Non Af Amer >60 >60 mL/min/1.73m2    Bilirubin, Total 0.3 0.0 - 1.0 mg/dL    Calcium 8.7 8.5 - 10.5 mg/dL    Protein, Total 6.4 6.0 - 8.0 g/dL    Albumin 3.9 3.5 - 5.0 g/dL    Alkaline Phosphatase 68 45 - 120 U/L    AST 15 0 - 40 U/L    ALT 27 0 - 45 U/L   HM1 (CBC with Diff)    Collection Time: 12/23/20 11:01 AM   Result Value Ref Range    WBC 6.9 4.0 - 11.0 thou/uL    RBC 4.79 4.40 - 6.20 mill/uL    Hemoglobin 13.3 (L) 14.0 - 18.0 g/dL    Hematocrit 42.0 40.0 - 54.0 %    MCV 88 80 - 100 fL    MCH 27.8 27.0 - 34.0 pg    MCHC 31.7 (L) 32.0 - 36.0 g/dL    RDW 13.8 11.0 - 14.5 %    Platelets " 164 140 - 440 thou/uL    MPV 9.3 8.5 - 12.5 fL    Neutrophils % 65 50 - 70 %    Lymphocytes % 23 20 - 40 %    Monocytes % 9 2 - 10 %    Eosinophils % 2 0 - 6 %    Basophils % 1 0 - 2 %    Immature Granulocyte % 1 (H) <=0 %    Neutrophils Absolute 4.4 2.0 - 7.7 thou/uL    Lymphocytes Absolute 1.6 0.8 - 4.4 thou/uL    Monocytes Absolute 0.6 0.0 - 0.9 thou/uL    Eosinophils Absolute 0.1 0.0 - 0.4 thou/uL    Basophils Absolute 0.0 0.0 - 0.2 thou/uL    Immature Granulocyte Absolute 0.1 (H) <=0.0 thou/uL      Imaging:    No results found.     Total time: 25 min; 20 min towards counseling/coordination of care    Signed by: Mar Francisco, CNP

## 2021-06-15 ENCOUNTER — APPOINTMENT (OUTPATIENT)
Dept: LAB | Facility: CLINIC | Age: 56
End: 2021-06-15
Attending: PHYSICIAN ASSISTANT
Payer: COMMERCIAL

## 2021-06-15 ENCOUNTER — HOSPITAL ENCOUNTER (OUTPATIENT)
Facility: AMBULATORY SURGERY CENTER | Age: 56
End: 2021-06-15
Attending: PHYSICIAN ASSISTANT
Payer: COMMERCIAL

## 2021-06-15 ENCOUNTER — ONCOLOGY VISIT (OUTPATIENT)
Dept: TRANSPLANT | Facility: CLINIC | Age: 56
End: 2021-06-15
Attending: PHYSICIAN ASSISTANT
Payer: COMMERCIAL

## 2021-06-15 ENCOUNTER — RECORDS - HEALTHEAST (OUTPATIENT)
Dept: ADMINISTRATIVE | Facility: OTHER | Age: 56
End: 2021-06-15

## 2021-06-15 VITALS
HEART RATE: 80 BPM | BODY MASS INDEX: 37.29 KG/M2 | SYSTOLIC BLOOD PRESSURE: 135 MMHG | TEMPERATURE: 97.9 F | WEIGHT: 251.8 LBS | DIASTOLIC BLOOD PRESSURE: 81 MMHG | OXYGEN SATURATION: 97 % | HEIGHT: 69 IN | RESPIRATION RATE: 20 BRPM

## 2021-06-15 DIAGNOSIS — C82.08 FOLLICULAR LYMPHOMA GRADE I, LYMPH NODES OF MULTIPLE SITES (H): ICD-10-CM

## 2021-06-15 DIAGNOSIS — Z11.59 ENCOUNTER FOR SCREENING FOR OTHER VIRAL DISEASES: ICD-10-CM

## 2021-06-15 LAB
ALBUMIN SERPL-MCNC: 2.7 G/DL (ref 3.4–5)
ALP SERPL-CCNC: 115 U/L (ref 40–150)
ALT SERPL W P-5'-P-CCNC: 34 U/L (ref 0–70)
ANION GAP SERPL CALCULATED.3IONS-SCNC: 9 MMOL/L (ref 3–14)
ANISOCYTOSIS BLD QL SMEAR: SLIGHT
AST SERPL W P-5'-P-CCNC: 25 U/L (ref 0–45)
BASOPHILS # BLD AUTO: 0 10E9/L (ref 0–0.2)
BASOPHILS NFR BLD AUTO: 0 %
BILIRUB SERPL-MCNC: 0.4 MG/DL (ref 0.2–1.3)
BUN SERPL-MCNC: 12 MG/DL (ref 7–30)
CALCIUM SERPL-MCNC: 9.2 MG/DL (ref 8.5–10.1)
CHLORIDE SERPL-SCNC: 101 MMOL/L (ref 94–109)
CO2 SERPL-SCNC: 27 MMOL/L (ref 20–32)
CREAT SERPL-MCNC: 1.24 MG/DL (ref 0.66–1.25)
DIFFERENTIAL METHOD BLD: ABNORMAL
EOSINOPHIL # BLD AUTO: 0.9 10E9/L (ref 0–0.7)
EOSINOPHIL NFR BLD AUTO: 6.9 %
ERYTHROCYTE [DISTWIDTH] IN BLOOD BY AUTOMATED COUNT: 15.9 % (ref 10–15)
GFR SERPL CREATININE-BSD FRML MDRD: 64 ML/MIN/{1.73_M2}
GLUCOSE SERPL-MCNC: 142 MG/DL (ref 70–99)
HCT VFR BLD AUTO: 29.7 % (ref 40–53)
HGB BLD-MCNC: 9.2 G/DL (ref 13.3–17.7)
LDH SERPL L TO P-CCNC: 253 U/L (ref 85–227)
LYMPHOCYTES # BLD AUTO: 7.1 10E9/L (ref 0.8–5.3)
LYMPHOCYTES NFR BLD AUTO: 54.3 %
MAGNESIUM SERPL-MCNC: 2 MG/DL (ref 1.6–2.3)
MCH RBC QN AUTO: 28.2 PG (ref 26.5–33)
MCHC RBC AUTO-ENTMCNC: 31 G/DL (ref 31.5–36.5)
MCV RBC AUTO: 91 FL (ref 78–100)
MONOCYTES # BLD AUTO: 1.1 10E9/L (ref 0–1.3)
MONOCYTES NFR BLD AUTO: 8.6 %
NEUTROPHILS # BLD AUTO: 3.9 10E9/L (ref 1.6–8.3)
NEUTROPHILS NFR BLD AUTO: 30.2 %
PLATELET # BLD AUTO: 74 10E9/L (ref 150–450)
PLATELET # BLD EST: ABNORMAL 10*3/UL
POIKILOCYTOSIS BLD QL SMEAR: SLIGHT
POTASSIUM SERPL-SCNC: 4 MMOL/L (ref 3.4–5.3)
PROT SERPL-MCNC: 6.4 G/DL (ref 6.8–8.8)
RBC # BLD AUTO: 3.26 10E12/L (ref 4.4–5.9)
SODIUM SERPL-SCNC: 137 MMOL/L (ref 133–144)
URATE SERPL-MCNC: 3.8 MG/DL (ref 3.5–7.2)
WBC # BLD AUTO: 13 10E9/L (ref 4–11)

## 2021-06-15 PROCEDURE — 84550 ASSAY OF BLOOD/URIC ACID: CPT | Performed by: INTERNAL MEDICINE

## 2021-06-15 PROCEDURE — 36591 DRAW BLOOD OFF VENOUS DEVICE: CPT

## 2021-06-15 PROCEDURE — 250N000011 HC RX IP 250 OP 636: Performed by: PHYSICIAN ASSISTANT

## 2021-06-15 PROCEDURE — G0463 HOSPITAL OUTPT CLINIC VISIT: HCPCS

## 2021-06-15 PROCEDURE — 83615 LACTATE (LD) (LDH) ENZYME: CPT | Performed by: INTERNAL MEDICINE

## 2021-06-15 PROCEDURE — 85025 COMPLETE CBC W/AUTO DIFF WBC: CPT | Performed by: INTERNAL MEDICINE

## 2021-06-15 PROCEDURE — 87040 BLOOD CULTURE FOR BACTERIA: CPT | Performed by: PHYSICIAN ASSISTANT

## 2021-06-15 PROCEDURE — 99214 OFFICE O/P EST MOD 30 MIN: CPT

## 2021-06-15 PROCEDURE — 80053 COMPREHEN METABOLIC PANEL: CPT | Performed by: INTERNAL MEDICINE

## 2021-06-15 PROCEDURE — 83735 ASSAY OF MAGNESIUM: CPT | Performed by: INTERNAL MEDICINE

## 2021-06-15 RX ORDER — HEPARIN SODIUM (PORCINE) LOCK FLUSH IV SOLN 100 UNIT/ML 100 UNIT/ML
5 SOLUTION INTRAVENOUS ONCE
Status: COMPLETED | OUTPATIENT
Start: 2021-06-15 | End: 2021-06-15

## 2021-06-15 RX ADMIN — Medication 5 ML: at 07:48

## 2021-06-15 ASSESSMENT — MIFFLIN-ST. JEOR: SCORE: 1949.6

## 2021-06-15 ASSESSMENT — PAIN SCALES - GENERAL: PAINLEVEL: MODERATE PAIN (5)

## 2021-06-15 NOTE — NURSING NOTE
Chief Complaint   Patient presents with     Port Draw     Labs drawn via port by RN in lab. VS taken.      Port accessed with 20 gauge 3/4 inch gripper needle and labs drawn by rn.  Port flushed with normal saline and heparin.  Pt tolerated well.  VS taken.  Pt checked in for next appts.    Merle Mednez RN

## 2021-06-15 NOTE — PROGRESS NOTES
"Patient arrived ambulatory and was seated in chair 6. Today's weight is 241.9 lbs - \"I almost always have a constant headache.\" Called triage and discussed plan of care - patient does not need an appt today; patient will be seen on March 25th (and labs will be done).     Premedicated with acetaminophen 650 mg. Accessed port a cath and obtained an excellent blood return. Infused IgG 40 gms over 3 hours 10 minutes, and at completion the IV line was flushed with NS 50. VSS. Patient has a slight headache and has \"spasms of the left posterior leg.\" Applied a warm blanket - \"at home, I take a warm bath.\" At completion the port a cath was flushed, heparinized, and deaccessed - covered the site with folded 2x2s and paper tape.     Left the unit ambulatory and in stable condition at 12:15, and plans to return in 4 weeks on March 25th. A copy of the appointment schedule was given to the patient; along with the necessary phone numbers to have available for any questions/concerns.    Emerald Trejo RN  "

## 2021-06-15 NOTE — PROGRESS NOTES
Patient arrived ambulatory, by self, after seeing MD, for treatment.  IV of NS initiated for treatment.  Given premeds PO/IVP as ordered.  Given Rituxan IVPB over 90 minutes at rate of 100cc/hr for 30 minutes and 200cc/hr for 1 hour.  Port flushed with NS.  Heparin instilled and needle dced and dressing applied.  Patient left ambulatory, by self, in stable condition.  Instructed to call with questions/concerns/problems.  Patient verbalized understanding.

## 2021-06-15 NOTE — NURSING NOTE
"Oncology Rooming Note    Nehal 15, 2021 8:03 AM   Juvenal Blake is a 55 year old male who presents for:    Chief Complaint   Patient presents with     Port Draw     Labs drawn via port by RN in lab. VS taken.      RECHECK     Return: Follicular lymphoma grade i, lymph nodes of multiple sites     Initial Vitals: /81 (BP Location: Right arm, Patient Position: Sitting, Cuff Size: Adult Large)   Pulse 80   Temp 97.9  F (36.6  C) (Oral)   Resp 20   Ht 1.74 m (5' 8.5\")   Wt 114.2 kg (251 lb 12.8 oz)   SpO2 97%   BMI 37.73 kg/m   Estimated body mass index is 37.73 kg/m  as calculated from the following:    Height as of this encounter: 1.74 m (5' 8.5\").    Weight as of this encounter: 114.2 kg (251 lb 12.8 oz). Body surface area is 2.35 meters squared.  Moderate Pain (5) Comment: Data Unavailable   No LMP for male patient.  Allergies reviewed: Yes  Medications reviewed: Yes    Medications: Medication refills not needed today.  Pharmacy name entered into [employer]: [id number] - [place], [place] - [address] AT [facility]    Clinical concerns: N/A      Shannen Hannon CMA              "

## 2021-06-15 NOTE — PROGRESS NOTES
Patient arrived ambulatory, by self, for port lab draw.  Port accessed under aseptic technique without problems - excellent blood return noted.  Blood drawn for labs.  Port flushed with NS.  Needle secured.

## 2021-06-15 NOTE — PROGRESS NOTES
[facility] Hematology and Oncology Progress Note    Patient: Juvenal Blake  MRN: [id number]  Date of Service:  January 24, 2018      Assessment and Plan:    1.  Follicular lymphoma: CT scan imaging was reviewed.  He has some right inguinal nodes and enlarging slightly.  Largest 17 mm.  Just above the upper limit of normal.  Remains on maintenance Rituxan.  Would be very unusual for follicular lymphoma to start progressing this soon after finishing chemotherapy (6 months) and while receiving maintenance rituximab.  We are going to recheck the scan in 3 months.  If these continue to enlarge will get a biopsy.  We will continue his Rituxan maintenance.      2.  Upper respiratory symptoms: Prescribed Z-Miguelangel today.     3.  Prostate cancer: He has a pathologic stage T3b prostate cancer.  Multiple positive margins at surgery.  3 lymph nodes were examined which were negative.  He is at high risk for recurrence.  He is going to start adjuvant radiation towards the end of February.  He tells me a recent PSA was within adequate limits.  In regards to the right inguinal nodes, this would be an unusual location for prostate spread but it is possible.  Again we will biopsy if they continue to enlarge.    ECOG Performance   ECOG Performance Status: 1    Distress Assessment  Distress Assessment Score: 2    Pain  Currently in Pain: Yes  Pain Score (Initial OR Reassessment): 2  Location: ribs    Diagnosis:    1.  Follicular lymphoma: Diagnosed in April, 2010 . Stage IV diagnosis with bone marrow involvement.  Initial disease involved the cervical, supraclavicular, axillary, mesenteric mass, retroperitoneal nodes, and possibly the manubrium.    Relapse noted on imaging in December 2016.    Treatment:    1.  He had 6 cycles of bendamustine and Rituxan completed in October, 2010.  He had 2 years of maintenance Rituxan therapy finishing in September, 2012.    2.  Second course of bendamustine plus rituximab started February 20, 2017.   Cycle 6 was completed on July 11, 2017.    He is now on maintenance rituximab.    Interim History:    Juvenal returns today for follow-up visit.  He was last seen in clinic 5 months ago.  In the interim he has been diagnosed with prostate cancer.  He has had a radical prostatectomy.  Now radiation is being planned.  He also has a head cold.  Otherwise no fevers or chills.  No night sweats.  No abdominal symptoms.  Energy is been adequate.    Review of Systems:    Constitutional  Constitutional (WDL): All constitutional elements are within defined limits  Neurosensory  Neurosensory (WDL): All neurosensory elements are within defined limits  Cardiovascular  Cardiovascular (WDL): All cardiovascular elements are within defined limits  Pulmonary  Respiratory (WDL): Exceptions to WDL (wheezing)  Cough: Mild symptoms, nonprescription intervention indicated  Dyspnea: Shortness of breath with moderate exertion  Gastrointestinal  Gastrointestinal (WDL): All gastrointestinal elements are within defined limits  Genitourinary  Genitourinary (WDL): All genitourinary elements are within defined limits  Integumentary  Integumentary (WDL): All integumentary elements are within defined limits  Patient Coping  Patient Coping: Accepting  Accompanied by  Accompanied by: Alone    Past History:    Past Medical History:   Diagnosis Date     Arthritis     shoulder per H & P     GERD (gastroesophageal reflux disease)      H/O pyloric stenosis     as an infant per H & P      Inguinal hernia     per H & P      Lazy eye     per H & P      Non Hodgkin's lymphoma      Prostate CA      Sleep apnea     CPAP        Physical Exam:    Recent Vitals 1/24/2018   Height -   Weight 256 lbs 10 oz   BSA (m2) -   /70   Pulse 78   Temp 97.9   Temp src 1   SpO2 94   Some recent data might be hidden     General: patient appears stated age of 52 y.o.. Nontoxic and in no distress.   HEENT: Head: atraumatic, normocephalic. Sclerae anicteric.  Nasal congestion  is noted.  Chest:  Normal respiratory effort.    Cardiac: No edema.  Abdomen: abdomen is soft, non-distended  Extremities: normal tone and muscle bulk.   Skin: no lesions or rash. Warm and dry.   CNS: alert and oriented x3. Grossly non-focal.   Psychiatric: normal mood and affect.     Lab Results:    Recent Results (from the past 168 hour(s))   Comprehensive Metabolic Panel   Result Value Ref Range    Sodium 140 136 - 145 mmol/L    Potassium 4.2 3.5 - 5.0 mmol/L    Chloride 106 98 - 107 mmol/L    CO2 25 22 - 31 mmol/L    Anion Gap, Calculation 9 5 - 18 mmol/L    Glucose 108 70 - 125 mg/dL    BUN 13 8 - 22 mg/dL    Creatinine 1.05 0.70 - 1.30 mg/dL    GFR MDRD Af Amer >60 >60 mL/min/1.73m2    GFR MDRD Non Af Amer >60 >60 mL/min/1.73m2    Bilirubin, Total 0.4 0.0 - 1.0 mg/dL    Calcium 9.2 8.5 - 10.5 mg/dL    Protein, Total 6.8 6.0 - 8.0 g/dL    Albumin 3.9 3.5 - 5.0 g/dL    Alkaline Phosphatase 90 45 - 120 U/L    AST 19 0 - 40 U/L    ALT 43 0 - 45 U/L   HM1 (CBC with Diff)   Result Value Ref Range    WBC 6.0 4.0 - 11.0 thou/uL    RBC 4.21 (L) 4.40 - 6.20 mill/uL    Hemoglobin 11.4 (L) 14.0 - 18.0 g/dL    Hematocrit 35.1 (L) 40.0 - 54.0 %    MCV 83 80 - 100 fL    MCH 27.1 27.0 - 34.0 pg    MCHC 32.5 32.0 - 36.0 g/dL    RDW 15.4 (H) 11.0 - 14.5 %    Platelets 239 140 - 440 thou/uL    MPV 9.8 8.5 - 12.5 fL    Neutrophils % 77 (H) 50 - 70 %    Lymphocytes % 10 (L) 20 - 40 %    Monocytes % 10 2 - 10 %    Eosinophils % 3 0 - 6 %    Basophils % 0 0 - 2 %    Neutrophils Absolute 4.6 2.0 - 7.7 thou/uL    Lymphocytes Absolute 0.6 (L) 0.8 - 4.4 thou/uL    Monocytes Absolute 0.6 0.0 - 0.9 thou/uL    Eosinophils Absolute 0.2 0.0 - 0.4 thou/uL    Basophils Absolute 0.0 0.0 - 0.2 thou/uL   LD(LDH)   Result Value Ref Range    LD (LDH) 212 125 - 220 U/L   HM1 (CBC with Diff)   Result Value Ref Range    WBC 6.9 4.0 - 11.0 thou/uL    RBC 4.14 (L) 4.40 - 6.20 mill/uL    Hemoglobin 11.3 (L) 14.0 - 18.0 g/dL    Hematocrit 34.2 (L) 40.0 -  54.0 %    MCV 83 80 - 100 fL    MCH 27.3 27.0 - 34.0 pg    MCHC 33.0 32.0 - 36.0 g/dL    RDW 15.5 (H) 11.0 - 14.5 %    Platelets 212 140 - 440 thou/uL    MPV 9.2 8.5 - 12.5 fL    Neutrophils % 83 (H) 50 - 70 %    Lymphocytes % 7 (L) 20 - 40 %    Monocytes % 8 2 - 10 %    Eosinophils % 2 0 - 6 %    Basophils % 0 0 - 2 %    Neutrophils Absolute 5.7 2.0 - 7.7 thou/uL    Lymphocytes Absolute 0.5 (L) 0.8 - 4.4 thou/uL    Monocytes Absolute 0.6 0.0 - 0.9 thou/uL    Eosinophils Absolute 0.2 0.0 - 0.4 thou/uL    Basophils Absolute 0.0 0.0 - 0.2 thou/uL     Imaging:    CT scan images were personally reviewed.  A few right inguinal nodes are slightly larger.    Ct Chest Abdomen Pelvis Without Oral With Iv Contrast    Result Date: 1/22/2018  CT CHEST, ABDOMEN, AND PELVIS 1/22/2018 2:09 PM      INDICATION: Lymphoma follow-up. TECHNIQUE: CT chest, abdomen, and pelvis. Dose reduction techniques were used. IV CONTRAST: Iohexol (Omni) 100 mL. COMPARISON: PET/CT 8/10/2017. 10/19/2017 outside PET/CT. FINDINGS: CHEST: No adenopathy. No effusions. Minimal apical emphysema. Stable 4 mm left lower lobe nodule (series 2, image 44). Minimal tree-in-bud nodules in the right lower lobe. Portacatheter.  ABDOMEN: Soft tissue stranding and nodes in the central mesentery and retroperitoneum are without significant change. Question of hepatic steatosis. Unremarkable gallbladder, pancreas and adrenals. Renal cysts. Spleen is normal in size. PELVIS: Interval increased right inguinal lymph nodes with example measuring 12 x 17 mm versus 8.5 x 13 mm previously (series 2, image 124). Otherwise, no significant new or enlarging pelvic adenopathy. No free fluid. No bowel obstruction. MUSCULOSKELETAL: New since prior, though likely subacute lateral left 6 rib fracture with without significant displacement.     CONCLUSION: 1.  Interval mildly increased right inguinal adenopathy worrisome for recurrent disease. 2.  Other regions show no significantly  increased adenopathy at this time. Stranding in the central mesentery and retroperitoneum shows no substantial change. 3.  Minimal new tree-in-bud nodules in the right lower lobe, presumed infectious / inflammatory.       Signed by: Rob Crooks MD

## 2021-06-15 NOTE — LETTER
6/15/2021         RE: Juvenal Blake  [address] [place] [address]        Dear Colleague,    Thank you for referring your patient, Juvenal Blake, to the [facility] [place]. Please see a copy of my visit note below.    BMT Clinic Note   Crow 15, 2021      Juvenal Blake is a 55 year old year old man diagnosed with NHL.  He has been treated with Bendamustine + Rituxan then Bendamustine +  Rituxan once again then R-CHOP followed by Nam-NK cell infusion. He is enrolled in protocol [id number], which utilizes Cy/Flu as a lymphodepletion regimen as bridge to Yescarta.    HPI:  No more fevers but WBC is still elevated.  Will culture port a cath again. He is still coughing but less.  He is sob because his abdomen is so enlarged that it makes him sob. He is very bloated and feels full all the time with pain.  The oxycodone helps  but would rather use his medical cannabis. No bleeding. Stools are no longer diarrhea, 4-5 per day and are soft.  Discussed that he should take the oral vancomycin for 10 days- with 4 capsules per day.  Asked him to count how many pills he got.  He says he got 2 boxes.  Asked him again to count.  Review of Systems: 8 point ROS negative except as noted above.    Physical Exam:   General: Non-toxic.   Eyes: : JOE, sclera anicteric   Lungs: CTA bilaterally. No rhonchi today  Cardiovascular: RRR, no M/R/G   Abdominal/Rectal: +protuberant, very firm to palpation. + BS  Lymphatics: no edema  Skin: no rashes or petechaie  Neuro: A&O   Additional Findings: Port site NT, no drainage.  Labs:      6/11/21 Blood cultures: NGTD    6/11/21 CXR: clear  Assessment and Plan:   1.  Lymphoma:  - He had 6 cycles of bendamustine and Rituxan completed in October, 2010. He had 2 years of maintenance Rituxan therapy finishing in September, 2012.  - Second course of bendamustine plus rituximab started February 20, 2017. Cycle 6 was completed on July 11, 2017. Then  had maintenance rituximab through January, 2019.  - O-CHOP started at second relapse. Cycle 1 given April 23, 2019. Cycle 6 given August 16, 2019.  Haplo NAM-NK therapy at [facility] September 1, 2020  - Iidelalisib started October 21, 2020 at Third relapse. 150 mg twice daily.      - Eligible to proceed with therapy with .  stopped Idelalisib - last dose was on 6/10 at 8am. Half life is 8 1/2 hours - x 5 = 42 hours. Plan was to begin LD chemotherapy 6/11 and receive  cells on 6/16, but since he has a new fever on 6/11 the plan was to hold off through the weekend.  LDH trending up so should not postpone chemo too long. Uric Acid check is normal  -Per NC: plan is to start LD chemo next week Wednesday 6/23 and get cells the following on 6/28 Monday.      2.  HEME:  No need for transfusions today. WBC coming down but still elevated.  Repeat blood cultures today.  C diff versus other infection?    3.  :   Initial radical prostatectomy and bilateral lymph node dissection. November 15, 2017. Prostatic adenocarcinoma [place] 4+3 = 7 with tertiary pattern 5. Tumor involves 45% of total surface area. Positive for extensive extraprostatic extension. Positive for bilateral seminal vesicle invasion. Multiple margins positive. Lymphovascular invasion not identified. Perineural invasion was noted. Lymph nodes negative. 3 were examined (2 right obturator and 1 left obturator).    Completed radiation to the prostate fossa and pelvic lymph nodes at [facility] early May 2018. He received 4500 cGy in 25 fractions. He then had 2340 cGy boost in 13 fractions to the prostatic fossa, for a total dose of 6240 cGy in 38 treatment fractions delivered over 54 days. He did not receive hormonal therapy:      4.  ID:    - 6/13: C.dif +. Start oral vanco 125 QID. Started on 6/13.  Need to take entire 10 day course of oral vanco.  Will count and make sure has seven days left of oral vanco.    -Febrile Friday. 6/11: CX blood from  port: negative  CXR clear.  - 6/11 Covid=neg.  . 6/11: RVP + for Rhinovirus  - Rocephin 2gm daily through the weekend, stopped on 6/13 -- given negative cutures    5.  FEN/Renal:  Creat=1.24 today.    6. Abdominal Pain secondary to cancer: colleague  gave script for oxycodone 15 tabs and lorazepam 15 tabs for difficulty sleeping. Instructed not to take both together due to sedation  -Dr Palacios ok for Juvenal to use medical grade liquid cannabis for pain.  Juvenal was asking about steroids for the lymphoma, will defer to Dr Terry tomorrow.  Final Plan:  Scheduled on 6/16/2021 to see Dr Terry  Does not need COVID test for chemo infusion but will need one within 96 hours of going to [facility] to get Yescarta    JAEL TomC  [id number]    30 minutes spent on the date of the encounter doing chart review, review of test results, interpretation of tests, patient visit and documentation                   Again, thank you for allowing me to participate in the care of your patient.        Sincerely,        BMT Advanced Practice Provider

## 2021-06-15 NOTE — TELEPHONE ENCOUNTER
Oral Chemotherapy Monitoring Program    Oral Chemotherapy Monitoring    Date: 2/17/21  Primary Oncologist: Rob Crooks MD  Primary Oncology Clinic:  [facility]  Cancer Diagnosis: Follicular lymphoma (H)  Drug Name: Idelalisib  Current Dosage: 150 mg BID  Current Frequency: Continuous  Encounter: Mid-Cycle Assessment   New Start:   until disease progression or unacceptable toxcities   Intervention: Adherence/Side Effects, See in clinic      Who was educated?: Patient       Mid-Cycle Assessment:  Unplanned doses missed in last 2 weeks: 2   Adherence assessment : adherent   Non-adherence intervention recommendation: He has alarms set, but the issue is remembering to take the medication with him if he is going to leave the house in the evening. Then he forgets to take it when he gets home. He'll keep trying to remember.   Drug Interaction Assessment: There were no significant drug interactions identified upon review of medication list with chemotherapy agents.   Adverse Effects reported: 5   Adverse Events and Interventions: Nausea (grade 1): Mild, controlled with prochlorperazine prn. Not every day.    Diarrhea (Grade 0/1): 2-3 times in the morning if anything. Does not need to take loperamide. Knows to call if it gets more severe as could need steroids to treat idelalisib induced diarrhea.    Cramps: Legs and hands. Something that has happened on and off for years. May try a glass of tonic water 1-2 times/day to see if that helps.    Headaches: daily for a couple weeks, could be related to dental work or cold. Controlled with ibuprofen and/or acetaminophen use.    Cold: sinus symptoms, stable for the past few weeks, antibiotics have helped, but not completely resolved   Next pharmacist intervention date: 2/25/21   Intervention: R               Assessment/Plan:  Juvenal is tolerating therapy. Still has some mild intermittent nausea, controlled with prochlorperazine.   More recent additional issues of  headache and persistent cold (providers aware).  Also still struggles for full adherence (misses 1-2 doses every 2 weeks due to forgetting the evening dose). Still having a good response in the mean time, but continues to strive for full adherence and understands importance.    Continue as prescribed.    Follow-Up:  Will review upcoming appointment on 2/25 and then plan a call a few weeks later. If Juvenal is still stable then will consider spacing out assessment phone calls to quarterly and Juvenal is onboard with that plan.    Cm Diaz, PharmD, BCOP  Oral Chemotherapy Pharmacist  [phone]

## 2021-06-15 NOTE — PROGRESS NOTES
BMT Clinic Note   Crow 15, 2021      Juvenal Blake is a 55 year old year old man diagnosed with NHL.  He has been treated with Bendamustine + Rituxan then Bendamustine +  Rituxan once again then R-CHOP followed by Nam-NK cell infusion. He is enrolled in protocol [id number], which utilizes Cy/Flu as a lymphodepletion regimen as bridge to Yescarta.    HPI:  No more fevers but WBC is still elevated.  Will culture port a cath again. He is still coughing but less.  He is sob because his abdomen is so enlarged that it makes him sob. He is very bloated and feels full all the time with pain.  The oxycodone helps  but would rather use his medical cannabis. No bleeding. Stools are no longer diarrhea, 4-5 per day and are soft.  Discussed that he should take the oral vancomycin for 10 days- with 4 capsules per day.  Asked him to count how many pills he got.  He says he got 2 boxes.  Asked him again to count.  Review of Systems: 8 point ROS negative except as noted above.    Physical Exam:   General: Non-toxic.   Eyes: : JOE, sclera anicteric   Lungs: CTA bilaterally. No rhonchi today  Cardiovascular: RRR, no M/R/G   Abdominal/Rectal: +protuberant, very firm to palpation. + BS  Lymphatics: no edema  Skin: no rashes or petechaie  Neuro: A&O   Additional Findings: Port site NT, no drainage.  Labs:      6/11/21 Blood cultures: NGTD    6/11/21 CXR: clear  Assessment and Plan:   1.  Lymphoma:  - He had 6 cycles of bendamustine and Rituxan completed in October, 2010. He had 2 years of maintenance Rituxan therapy finishing in September, 2012.  - Second course of bendamustine plus rituximab started February 20, 2017. Cycle 6 was completed on July 11, 2017. Then had maintenance rituximab through January, 2019.  - O-CHOP started at second relapse. Cycle 1 given April 23, 2019. Cycle 6 given August 16, 2019.  Haplo NAM-NK therapy at [facility] September 1, 2020  - Iidelalisib started October 21, 2020 at Third relapse. 150 mg twice daily.       - Eligible to proceed with therapy with .  stopped Idelalisib - last dose was on 6/10 at 8am. Half life is 8 1/2 hours - x 5 = 42 hours. Plan was to begin LD chemotherapy 6/11 and receive  cells on 6/16, but since he has a new fever on 6/11 the plan was to hold off through the weekend.  LDH trending up so should not postpone chemo too long. Uric Acid check is normal  -Per NC: plan is to start LD chemo next week Wednesday 6/23 and get cells the following on 6/28 Monday.      2.  HEME:  No need for transfusions today. WBC coming down but still elevated.  Repeat blood cultures today.  C diff versus other infection?    3.  :   Initial radical prostatectomy and bilateral lymph node dissection. November 15, 2017. Prostatic adenocarcinoma [place] 4+3 = 7 with tertiary pattern 5. Tumor involves 45% of total surface area. Positive for extensive extraprostatic extension. Positive for bilateral seminal vesicle invasion. Multiple margins positive. Lymphovascular invasion not identified. Perineural invasion was noted. Lymph nodes negative. 3 were examined (2 right obturator and 1 left obturator).    Completed radiation to the prostate fossa and pelvic lymph nodes at [facility] early May 2018. He received 4500 cGy in 25 fractions. He then had 2340 cGy boost in 13 fractions to the prostatic fossa, for a total dose of 6240 cGy in 38 treatment fractions delivered over 54 days. He did not receive hormonal therapy:      4.  ID:    - 6/13: C.dif +. Start oral vanco 125 QID. Started on 6/13.  Need to take entire 10 day course of oral vanco.  Will count and make sure has seven days left of oral vanco.    -Febrile Friday. 6/11: CX blood from port: negative  CXR clear.  - 6/11 Covid=neg.  . 6/11: RVP + for Rhinovirus  - Rocephin 2gm daily through the weekend, stopped on 6/13 -- given negative cutures    5.  FEN/Renal:  Creat=1.24 today.    6. Abdominal Pain secondary to cancer: colleague  gave script for oxycodone  15 tabs and lorazepam 15 tabs for difficulty sleeping. Instructed not to take both together due to sedation  -Dr Palacios ok for Juvenal to use medical grade liquid cannabis for pain.  Juvenal was asking about steroids for the lymphoma, will defer to Dr Terry tomorrow.  Final Plan:  Scheduled on 6/16/2021 to see Dr Terry  Does not need COVID test for chemo infusion but will need one within 96 hours of going to [facility] to get Yescarta    Glory Covarrubias, PA-C  [id number]    30 minutes spent on the date of the encounter doing chart review, review of test results, interpretation of tests, patient visit and documentation

## 2021-06-16 ENCOUNTER — ONCOLOGY VISIT (OUTPATIENT)
Dept: TRANSPLANT | Facility: CLINIC | Age: 56
End: 2021-06-16
Payer: COMMERCIAL

## 2021-06-16 VITALS
TEMPERATURE: 99.8 F | SYSTOLIC BLOOD PRESSURE: 127 MMHG | BODY MASS INDEX: 38.49 KG/M2 | DIASTOLIC BLOOD PRESSURE: 87 MMHG | HEART RATE: 87 BPM | WEIGHT: 256.9 LBS

## 2021-06-16 DIAGNOSIS — C82.08 FOLLICULAR LYMPHOMA GRADE I, LYMPH NODES OF MULTIPLE SITES (H): Primary | ICD-10-CM

## 2021-06-16 DIAGNOSIS — A04.72 C. DIFFICILE COLITIS: Primary | ICD-10-CM

## 2021-06-16 PROCEDURE — G0463 HOSPITAL OUTPT CLINIC VISIT: HCPCS

## 2021-06-16 PROCEDURE — 99214 OFFICE O/P EST MOD 30 MIN: CPT

## 2021-06-16 RX ORDER — LOPERAMIDE HYDROCHLORIDE 2 MG/1
2 TABLET ORAL 4 TIMES DAILY PRN
Qty: 30 TABLET | Refills: 0 | Status: SHIPPED | OUTPATIENT
Start: 2021-06-16 | End: 2021-08-17

## 2021-06-16 ASSESSMENT — PAIN SCALES - GENERAL: PAINLEVEL: NO PAIN (0)

## 2021-06-16 NOTE — TELEPHONE ENCOUNTER
Oral Chemotherapy Monitoring Program    Oral Chemotherapy Monitoring    Date: 3/16/21  Primary Oncologist: Rob Crooks MD  Primary Oncology Clinic:  [facility]  Cancer Diagnosis: Follicular lymphoma (H)  Drug Name: Idelalisib  Current Dosage: 150 mg BID  Current Frequency: Continuous  Encounter: Mid-Cycle Assessment   New Start:   until disease progression or unacceptable toxcities   Intervention: Adherence/Side Effects, See in clinic      Who was educated?: Patient       Mid-Cycle Assessment:  Unplanned doses missed in last 2 weeks: 1   Adherence assessment : adherent   Non-adherence intervention recommendation: He has alarms set, but the issue is remembering to take the medication with him if he is going to leave the house in the evening. Then he forgets to take it when he gets home. He'll keep trying to remember.   Drug Interaction Assessment: There were no significant drug interactions identified upon review of medication list with chemotherapy agents.   Adverse Effects reported: 4   Adverse Events and Interventions: Nausea (grade 1): Mild, controlled with prochlorperazine prn. Not every day. Did note one episode of emesis in the past 2 weeks which is rare for him.    Diarrhea (Grade 0/1): Stable. About a couple times a week. 2-3 times in the morning if anything. Does not need to take loperamide. Knows to call if it gets more severe as could need steroids to treat idelalisib induced diarrhea. Encouraged him to make sure he stays hydrated which he admits he hasn't done a great job about. Also dehydration could contribute to headaches he's been having.    Headaches: daily for a couple weeks, could be related to dental work or cold. Notes a bit of pressure in his sinuses. Controlled with ibuprofen and/or acetaminophen use.    Cold: Still present and not longer on antibiotics. sinus symptoms, stable for the past few weeks, antibiotics have helped, but not completely resolved    Cramps (resolved): legs  and hands. Something that has happened on and off for years. Has tried tonic water 1-2 times/day and noted it might have helped   Next pharmacist intervention date: 3/26/21   Intervention: R               Assessment/Plan:  Juvenal is tolerating therapy with stable side effects. Still experiencing this sinusitis and no longer on antibiotics. Could be contributing to headaches.   Diarrhea and nausea continue to be present, but manageable. Did express interest in [facility] re-registering him for medical cannabis as he feels like this may help.    Follow-Up:  Will review appointment with Rosangela on 3/26 and then plan to call a few weeks later. Though we had previously talked about moving him to quarterly assessments, after discussing with Juvenal we both agreed to keep him on monthly assessments for now at least until his headaches and sinusitis resolve.    Cm Diaz, PharmD, BCOP  Oral Chemotherapy Pharmacist  [phone]

## 2021-06-16 NOTE — PROGRESS NOTES
Patient arrived ambulatory and was seated in chair 6. Today's weight is 254.6 lbs - Patient states his HA's are better since taking the medical cannabis.   Premedicated with acetaminophen 650 mg. Accessed port a cath and obtained an excellent blood return. Infused IgG 40 gms over 2 hours 40 minutes, and at completion the IV line was flushed with NS. VSS. Patient has a slight headache but no change from the start of the infusion.  At completion the port a cath was flushed, heparinized, and deaccessed - covered the site with folded 2x2s and paper tape.     Left the unit ambulatory and in stable condition at 12:00, and plans to return in 4 weeks on 5/21/21.

## 2021-06-16 NOTE — PROGRESS NOTES
Faxed completed paperwork to Natalia FRANCE with [employer] @ [phone] regarding patient claim # [id number]. Copy will be sent for scanning into patient chart and original will be returned to patient. Suri Pak, CMA

## 2021-06-16 NOTE — PROGRESS NOTES
"Patient arrived ambulatory at 08:05, seated in chair 6. Reviewed plan of care and today's treatment - today's weight is 245 lbs. Patient continues to have frequent headaches, \"I do use medical cannabis.\" Accessed port a cath per sterile technique and obtained an excellent blood return. Labs were drawn. At 09:10 this writer took the patient [facility] w/[facility] with Madhavi Kurtz CNP. Upon patient's return, the IgG 40 gms was infused over 3 hours. Premedicated with oral acetaminophen. At completion the port a cath was flushed, heparinized, and deaccessed - covered the site with folded 2x2s and paper tape. A copy of today's lab results was given and reviewed with the patient, along with an appointment schedule. This patient left the unit ambulatory and in stable condition at 13:03 - patient plans to return in 4 weeks (April 22nd). Patient was instructed to call with any questions or concerns; and is scheduled for an MRI of the head next week.    Emerald Trejo RN  "

## 2021-06-16 NOTE — PROGRESS NOTES
[facility] Hematology and Oncology Progress Note    Patient: Juvenal Blake  MRN: [id number]  Date of Service: 03/25/21          Reason for Visit    Chief Complaint   Patient presents with     HE Cancer     Follicular lymphoma grade i, lymph nodes of multiple sites       Assessment and Plan    1.  Follicular lymphoma, low-grade.  Pt currently on idelalisib. Seems to be improving disease. Last PET scan in January showed improvement. He will return in 2 months with repeat PET scan. He will be referred back to the [facility] upon progression for clinical trial consideration.     2. Prostate cancer: s/p surgery and radiation, which finished around May 2018. PSA has been trending up. Pending from today. We will monitor on scans, but his lymphoma seems to be bigger issue at this time.     3. Recurrent sinus infections: Low IgA, IgG, IgM levels. He has been getting monthly IVIG. That has helped him to have less recurrent infections. Continue monthly.     4. Headaches: much worse over the last 2 months. We will get Brain MRI. Encourage good hydration, good posture. Use massage in neck if possible.     5. shortness of breath, fatigue, nausea: all from idelalisib. Medical cannabis works pretty well for some of these symptoms. Encourage him to be more active.       ECOG Performance   ECOG Performance Status: 1    Distress Assessment  Distress Assessment Score: Unable to rate: no need for assistance.     Pain  Currently in Pain: Yes  Location: shoulders and groin: using OTC, medical cannabis. No need for change.       Problem List    1. Follicular lymphoma grade i, lymph nodes of multiple sites (H)  MR Brain With Without Contrast    NM PET CT Skull to Mid Thigh   2. Acute nonintractable headache, unspecified headache type  MR Brain With Without Contrast      ______________________________________________________________________________    History of Present Illness    Diagnosis:     1. Follicular lymphoma: Diagnosed in April, 2010  . Stage IV diagnosis with bone marrow involvement. Initial disease involved the cervical, supraclavicular, axillary, mesenteric mass, retroperitoneal nodes, and possibly the manubrium.    2.  Prostate cancer:  Pathologic stage T3b prostate cancer.  Positive margins, multiple, and surgery.  3 lymph nodes were negative.  High risk for recurrent disease    3.  Hypogammaglobulinemia: He has received intermittent doses of IVIG.    Treatment:     Lymphoma:  He had 6 cycles of bendamustine and Rituxan completed in October, 2010.  He had 2 years of maintenance Rituxan therapy finishing in September, 2012.  Second course of bendamustine plus rituximab started February 20, 2017.  Cycle 6 was completed on July 11, 2017.      O-CHOP started at second relapse.  Cycle 1 given April 23, 2019.  Cycle 6 given August 16, 2019.     Haplo NAM-NK therapy at [facility] September 1, 2020      idelalisib started October 21, 2020.  150 mg twice daily.       Prostate: Initial radical prostatectomy.  November 15, 2017.  Prostatic adenocarcinoma Maricel 4+3 = 7 with tertiary pattern 5.  Tumor involves 45% of total surface area.  Positive for extensive extraprostatic extension.  Positive for bilateral seminal vesicle invasion.  Multiple margins positive.  Lymphovascular invasion not identified.  Lymph nodes negative.  3 were examined.  He received adjuvant radiation and finished around May 2018. He received 4500 cGy in 25 fractions.  He then had 2340 cGy boost in 13 fractions to the prostatic fossa, for a total dose of 6240 cGy in 38 treatment fractions delivered over 54 days. He did not receive hormonal therapy    Interim History:  Juvenal returns to the clinic today to continue on treatment. He is doing ok. Feels really fatigued from chemo pill. Also has a lot of shortness of breath. Still has a fair amt of incontinence from prostate cancer treatment. He has nausea from chemo pill as well. Managed with medical cannabis. No recent infections. Has  some muscle cramps. Also headaches that have been present for about 1-2 months. Has met with chiropractor and that is not helping. They are sometimes mild and sometimes severe. Do go away with OTC/cannabis. New.     Pain Status  Currently in Pain: Yes    Review of Systems  Constitutional  Constitutional (WDL): Exceptions to WDL  Fatigue: Concerns  Neurosensory  Neurosensory (WDL): Exceptions to WDL(pt uses medical cannabis for headaches)  Dizziness: Concerns(frequent headaches and tylenol is helpful)  Eye   Eye Disorder (WDL): All eye disorder elements are within defined limits  Ear  Ear Disorder (WDL): All ear disorder elements are within defined limits  Cardiovascular  Cardiovascular (WDL): All cardiovascular elements are within defined limits  Pulmonary  Respiratory (WDL): Exceptions to WDL  Cough: Concerns  Dyspnea: Concerns  Gastrointestinal  Gastrointestinal (WDL): Exceptions to WDL  Nausea: Concerns(uses anti-nausea medications)  Vomiting: Concerns  Diarrhea: Concerns  Genitourinary  Genitourinary (WDL): Exceptions to WDL  Urinary Frequency: Concerns  Lymphatic  Lymph (WDL): Assessment not pertinent to visit  Musculoskeletal and Connective Tissue  Musculoskeletal and Connetive Tissue Disorders (WDL): Assessment not pertinent to visit  Integumentary  Integumentary (WDL): Exceptions to WDL(nails)  Patient Coping  Patient Coping: Accepting;Open/discussion  Accompanied by  Accompanied by: Alone  Oral Chemo Adherence           Past History  Past Medical History:   Diagnosis Date     Arthritis     shoulder per H & P     GERD (gastroesophageal reflux disease)      H/O pyloric stenosis     as an infant per H & P      Inguinal hernia     per H & P      Lazy eye     per H & P      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    GENERAL: no acute distress.  Cooperative in conversation. Here alone due to visitor restrictions. Mask on  RESP: Regular respiratory rate. No expiratory wheezes   MUSCULOSKELETAL: mild bilateral leg swelling  NEURO: non focal. Alert and oriented x3.   PSYCH: within normal limits. No depression or anxiety.  SKIN: exposed skin is dry intact.           Lab Results    Recent Results (from the past 168 hour(s))   Comprehensive Metabolic Panel   Result Value Ref Range    Sodium 140 136 - 145 mmol/L    Potassium 3.6 3.5 - 5.0 mmol/L    Chloride 105 98 - 107 mmol/L    CO2 23 22 - 31 mmol/L    Anion Gap, Calculation 12 5 - 18 mmol/L    Glucose 171 (H) 70 - 125 mg/dL    BUN 14 8 - 22 mg/dL    Creatinine 1.09 0.70 - 1.30 mg/dL    GFR MDRD Af Amer >60 >60 mL/min/1.73m2    GFR MDRD Non Af Amer >60 >60 mL/min/1.73m2    Bilirubin, Total 0.3 0.0 - 1.0 mg/dL    Calcium 8.8 8.5 - 10.5 mg/dL    Protein, Total 6.4 6.0 - 8.0 g/dL    Albumin 3.6 3.5 - 5.0 g/dL    Alkaline Phosphatase 92 45 - 120 U/L    AST 22 0 - 40 U/L    ALT 32 0 - 45 U/L   HM1 (CBC with Diff)   Result Value Ref Range    WBC 7.7 4.0 - 11.0 thou/uL    RBC 3.92 (L) 4.40 - 6.20 mill/uL    Hemoglobin 11.7 (L) 14.0 - 18.0 g/dL    Hematocrit 35.9 (L) 40.0 - 54.0 %    MCV 92 80 - 100 fL    MCH 29.8 27.0 - 34.0 pg    MCHC 32.6 32.0 - 36.0 g/dL    RDW 16.2 (H) 11.0 - 14.5 %    Platelets 133 (L) 140 - 440 thou/uL    MPV 9.5 8.5 - 12.5 fL    Neutrophils % 72 (H) 50 - 70 %    Lymphocytes % 15 (L) 20 - 40 %    Monocytes % 8 2 - 10 %    Eosinophils % 4 0 - 6 %    Basophils % 1 0 - 2 %    Immature Granulocyte % 1 (H) <=0 %    Neutrophils Absolute 5.6 2.0 - 7.7 thou/uL    Lymphocytes Absolute 1.1 0.8 - 4.4 thou/uL    Monocytes Absolute 0.7 0.0 - 0.9 thou/uL    Eosinophils Absolute 0.3 0.0 - 0.4 thou/uL    Basophils Absolute 0.0 0.0 - 0.2 thou/uL    Immature Granulocyte Absolute 0.1 (H) <=0.0 thou/uL       Imaging    No results found.        Signed by: Madhavi Kurtz, CNP

## 2021-06-16 NOTE — PROGRESS NOTES
PT here for rituxan infusion. PT states some fatigue, has about 2-3 loose stools a day and uses immodium Port accessed sterile technique, labs drawn and reviewed. Premeds given followed by rituxan infused at rapid infusion rate; initiated at 100 ml per hour for 30 minutes then increased to 200 ml per hour for remainder of infusion. PT tolerated infusion without any side effects. Tubing flushed with ns then port flushed with heparin/deaccessed with 2x2 to site. Follow up reviewed and pt dc'd steady gait.

## 2021-06-16 NOTE — TELEPHONE ENCOUNTER
Called patient to notify him of normal brain MRI. Patient verbalized appreciation of call. Patient reports headaches are still present. Reviewed with Madhavi who recommends OTC pain medication. If he wants to see his PCP for evaluation he could certainly try that route. Patient verbalized understanding to this. Suri Pak, [facility]

## 2021-06-16 NOTE — NURSING NOTE
"Oncology Rooming Note    June 16, 2021 4:00 PM   Juvenal Blake is a 55 year old male who presents for:    Chief Complaint   Patient presents with     Oncology Clinic Visit     Follicular Lymphoma      Initial Vitals: /87 (BP Location: Left arm, Patient Position: Sitting, Cuff Size: Adult Regular)   Pulse 87   Temp 99.8  F (37.7  C) (Oral)   Wt 116.5 kg (256 lb 14.4 oz)   BMI 38.49 kg/m   Estimated body mass index is 38.49 kg/m  as calculated from the following:    Height as of 6/15/21: 1.74 m (5' 8.5\").    Weight as of this encounter: 116.5 kg (256 lb 14.4 oz). Body surface area is 2.37 meters squared.  No Pain (0) Comment: Data Unavailable   No LMP for male patient.  Allergies reviewed: Yes  Medications reviewed: Yes    Medications: Medication refills not needed today.  Pharmacy name entered into [employer]: [id number] - [place], [place] - [address] AT [facility]    Clinical concerns: No new concerns.        Bárbara Krueger               "

## 2021-06-16 NOTE — TELEPHONE ENCOUNTER
Telephone Encounter by Rolanda Han at 12/3/2019  2:27 PM     Author: Rolanda Han Service: -- Author Type: --    Filed: 12/3/2019  2:33 PM Encounter Date: 12/2/2019 Status: Signed    : Rolanda Han       PRIOR AUTHORIZATION DENIED    Denial Rational: Must try/fail Advair or generic Airduo (fluticasone-salmeterol)        Appeal Information: This medication was denied. If physician would like to appeal because patient has contraindication or allergy to covered medication please write letter of medical necessity and route back to PA team to initiate.  If no further action is needed please close encounter thank you. ;

## 2021-06-16 NOTE — TELEPHONE ENCOUNTER
----- Message from Suri Pak CMA sent at 3/25/2021  1:43 PM CDT -----  Regarding: Brain MRI  Patient scheduled for Brain MRI on 3/30

## 2021-06-16 NOTE — TELEPHONE ENCOUNTER
Oral Chemotherapy Monitoring Program    Oral Chemotherapy Monitoring    Date: 4/20/21  Primary Oncologist: Rob Crooks MD  Primary Oncology Clinic:  MEDICAL ONCOLOGY  Cancer Diagnosis: Follicular lymphoma (H)  Drug Name: Idelalisib  Current Dosage: 150 mg BID  Current Frequency: Continuous  Encounter: Mid-Cycle Assessment   New Start:   until disease progression or unacceptable toxcities   Intervention: Adherence/Side Effects, See in clinic      Who was educated?: Patient       Mid-Cycle Assessment:  Unplanned doses missed in last 2 weeks: 1   Adherence assessment : adherent   Non-adherence intervention recommendation: He has alarms set, but the issue is remembering to take the medication with him if he is going to leave the house in the evening. Then he forgets to take it when he gets home. He'll keep trying to remember.  Ongoing issue, but is still above 90% adherent based on our discussion. He will try to work on this of course.   Drug Interaction Assessment: There were no significant drug interactions identified upon review of medication list with chemotherapy agents.   Adverse Effects reported: 5   Adverse Events and Interventions: Nausea (grade 1): Ongoing, mild. Uses prochlorperazine prn, closer to every day. Medical cannabis helps.    Diarrhea (Grade 0/1): antidiarrheal, loperamide, tends to work for him if he needs it. Similar to last discussion, but again discussed if severe could need steroids to treat idelalisib induced diarrhea.     Headaches: MRI was normal. Still notes a bit of pressure in his sinuses. Headaches have been a lot better since he started using medical cannabis.     Cold: Still present, thinking allergy vs sinus symptoms. Takes mucinex and claritin which clears him up. He also had a tooth extraction and was on amoxicillin which may help, but hard to tease out.    Rash: areas turn red, blotchy around stomach area into chest/arms. Sometimes is itchy, but antihistamine works to  reduce itchiness and he takes only as needed. This has been going on for a while, can't remember if this was before idelalisib or coincide with; Continue to monitor.     Next pharmacist intervention date: 5/17/21   Intervention: R           Subjective/Objective:  Juvenal Blake is a 55 y.o. male called by phone for a follow-up visit for oral chemotherapy.  Juvenal has some adverse effects ongoing since starting idelalisib. He said he still has some bad days (sick days), but he has been managing the side effects. After asking if he has experienced any rash, he did mention that his chest/arm/abdomen areas looks blotchy sometimes, but nothing alarming (not infected/sloughing/raised) which is why he never mentioned it. We discussed it is something to continue to monitor for now.    No flowsheet data found.    Assessment/Plan:  Juvenal tolerates therapy with side effects. Discussed when to call us and what to look out for, especially with diarrhea, infection related symptoms, and cough/SOB.     Follow-Up:  Will plan to call in a few weeks then review appt in one month. He was okay with this plan and has our number in case he needs us before then.    Bre Carrillo, PharmD  Oral Chemotherapy Pharmacist  [phone]

## 2021-06-16 NOTE — LETTER
6/16/2021         RE: Juvenal Blake  [address] [place] [address]        Dear Colleague,    Thank you for referring your patient, Juvenal Blake, to the [facility] BLOOD AND MARROW TRANSPLANT PROGRAM [place]. Please see a copy of my visit note below.    BMT Clinic Note   Jun 16, 2021      Juvenal Blake is a 55 year old year old male diagnosed with NHL.  He has been treated with Bendamustine + Rituxan then Bendamustine +  Rituxan once again the R-CHOP followed by Nam-NK cell infusion. He is enrolled in protocol QR2522-42, which utilizes Cy/Flu as a lymphodepletion regimen as bridge to Yescarta.    HPI:  Juvenal is better, diarrhea 3-4 times a day, small amount, less abd pain, no more fever since the weekend.  Overall feels much better, taking oral Vanco.   No new complains.     ROS:  No chest pain. No SOB. Cough a little better. No bleeding. Drinking fluids better    Review of Systems: 8 point ROS negative except as noted above.    Physical Exam:   Blood pressure 127/87, pulse 87, temperature 99.8  F (37.7  C), temperature source Oral, weight 116.5 kg (256 lb 14.4 oz).  General: Non-toxic. Conversant. KPS 70-80  Eyes: : JOE, sclera anicteric   Lungs: CTA bilaterally. No rhonchi today  Cardiovascular: RRR, no M/R/G   Abdominal/Rectal: +protuberant, firm to palpation. + BS  Lymphatics: no edema  Skin: no rashes or petechaie  Neuro: A&O   Additional Findings: Jacobs site NT, no drainage.  Labs:  Lab Results   Component Value Date    WBC 13.0 (H) 06/15/2021    ANEU 3.9 06/15/2021    HGB 9.2 (L) 06/15/2021    HCT 29.7 (L) 06/15/2021    PLT 74 (L) 06/15/2021     06/15/2021    POTASSIUM 4.0 06/15/2021    CHLORIDE 101 06/15/2021    CO2 27 06/15/2021     (H) 06/15/2021    BUN 12 06/15/2021    CR 1.24 06/15/2021    MAG 2.0 06/15/2021    INR 0.97 06/04/2021    AST 25 06/15/2021    ALT 34 06/15/2021     C.diff pos 6/13 6/11/21 Blood cultures: NGTD    6/11/21 CXR: clear  Assessment and  Plan:   1.  Lymphoma:  - He had 6 cycles of bendamustine and Rituxan completed in October, 2010. He had 2 years of maintenance Rituxan therapy finishing in September, 2012.  - Second course of bendamustine plus rituximab started February 20, 2017. Cycle 6 was completed on July 11, 2017. Then had maintenance rituximab through January, 2019.  - O-CHOP started at second relapse. Cycle 1 given April 23, 2019. Cycle 6 given August 16, 2019.  [facility]  - Iidelalisib started October 21, 2020 [facility]. 150 mg twice daily.  stopped Idelalisib - last dose was on 6/10 at 8am.       - Eligible to proceed with therapy with .    Delay now due to C.diff.  New plan: Begin LD chemotherapy 6/24 (+/-) and receive  cells weekly x 3.  LDH stable. Uric Acid check is normal    No further fevers so should be ok to start LD chemo on Tuesday - Thursday -- this is scheduled. Needs to get started as I think his abdominal pain/firmness is from the progressive lymphoma    2.  HEME:  No need for transfusions today. WBC coming down    3.  :   Initial radical prostatectomy and bilateral lymph node dissection. November 15, 2017. Prostatic adenocarcinoma Maricel 4+3 = 7 with tertiary pattern 5. Tumor involves 45% of total surface area. Positive for extensive extraprostatic extension. Positive for bilateral seminal vesicle invasion. Multiple margins positive. Lymphovascular invasion not identified. Perineural invasion was noted. Lymph nodes negative. 3 were examined (2 right obturator and 1 left obturator).    Completed radiation to the prostate fossa and pelvic lymph nodes at [facility] early May 2018. He received 4500 cGy in 25 fractions. He then had 2340 cGy boost in 13 fractions to the prostatic fossa, for a total dose of 6240 cGy in 38 treatment fractions delivered over 54 days. He did not receive hormonal therapy:      4.  ID:  Febrile Friday. C.diff colitis.   Clinically  better. Cont oral Vancomycin to complete the course.    COVID negative. RVP + for Rhinovirus  - S/p Rocephin 2gm daily x 3 days - now stopped.  - treated infection (C.diff) for 1 week - will ready to start LD chemo around 6/24    5.  FEN/Renal: stable.     6. Abdominal Pain secondary to c. Diff colitis and cancer: better now.   gave script for oxycodone 15 tabs and lorazepam 15 tabs for difficulty sleeping. Instructed not to take both together due to sedation    Final Plan:  Complete course of oral Vancomycin - C.diff is improving.   LD chemotherapy prior to  on 6/24  OK to proceed  He needs a new calendar    Rosa Terry MD          Again, thank you for allowing me to participate in the care of your patient.        Sincerely,        Rosa Terry MD

## 2021-06-17 ENCOUNTER — TELEPHONE (OUTPATIENT)
Dept: TRANSPLANT | Facility: CLINIC | Age: 56
End: 2021-06-17

## 2021-06-17 ENCOUNTER — AMBULATORY - HEALTHEAST (OUTPATIENT)
Dept: LAB | Facility: CLINIC | Age: 56
End: 2021-06-17

## 2021-06-17 DIAGNOSIS — Z11.59 ENCOUNTER FOR SCREENING FOR OTHER VIRAL DISEASES: ICD-10-CM

## 2021-06-17 LAB
BACTERIA SPEC CULT: NO GROWTH
SPECIMEN SOURCE: NORMAL

## 2021-06-17 NOTE — PROGRESS NOTES
"Oncology Rooming Note    05/21/21 9:06 AM    Juvenal Blake is a 55 y.o. male who presents for:    Chief Complaint   Patient presents with     HE Cancer     Follicular lymphoma       Initial Vitals: BP (!) 156/99   Pulse 72   Temp 98.9  F (37.2  C) (Oral)   Wt (!) 247 lb 3.2 oz (112.1 kg)   SpO2 96%   BMI 37.59 kg/m       Estimated body mass index is 37.59 kg/m  as calculated from the following:    Height as of 5/17/21: 5' 8\" (1.727 m).    Weight as of this encounter: 247 lb 3.2 oz (112.1 kg).     Body surface area is 2.32 meters squared.      Allergies reviewed: Yes  Medications reviewed: Yes    Refills needed: No  Pharmacy name entered into EPIC: @[facility]@      Clinical concerns: seasonal allergies, stomach pains, lymph node swelling to gorin area      Brooke Mendieta RN, Oncology Clinic   [facility]    "

## 2021-06-17 NOTE — TELEPHONE ENCOUNTER
Oral Chemotherapy Monitoring Program    Oral Chemotherapy Monitoring    Date: 5/14/21  Primary Oncologist: Rob Crooks MD  Primary Oncology Clinic:  MEDICAL ONCOLOGY  Cancer Diagnosis: Follicular lymphoma (H)  Drug Name: Idelalisib  Current Dosage: 150 mg BID  Current Frequency: Continuous  Encounter: Mid-Cycle Assessment   New Start:   until disease progression or unacceptable toxcities   Intervention: Adherence/Side Effects, See in clinic      Who was educated?: Patient       Mid-Cycle Assessment:  Unplanned doses missed in last 2 weeks: 1   Adherence assessment : adherent   Non-adherence intervention recommendation: He has alarms set, but the issue is remembering to take the medication with him if he is going to leave the house in the evening. Then he forgets to take it when he gets home. He'll keep trying to remember.  Ongoing issue, but is still above 90% adherent based on our discussion. He will try to work on this of course.   Drug Interaction Assessment: There were no significant drug interactions identified upon review of medication list with chemotherapy agents.   Adverse Effects reported: 6   Adverse Events and Interventions: Nausea (grade 1): Ongoing, mild. Uses prochlorperazine prn, closer to every day. Medical cannabis helps.    Diarrhea (Grade 0/1): antidiarrheal, loperamide, tends to work for him if he needs it. Similar to last discussion, but again discussed if severe could need steroids to treat idelalisib induced diarrhea.     Headaches: MRI was normal. Still notes a bit of pressure in his sinuses. Headaches have been a lot better since he started using medical cannabis.     Cold: Still present, thinking allergy. Takes mucinex and claritin which clears him up.     Rash: areas turn red, blotchy around stomach area into chest/arms. Not itchy. S This has been going on for a while, can't remember if this was before idelalisib or coincide with; Continue to monitor.    Swelling in legs (grade  1): More on the right side than left, same spot where he had enlarged LN in groin area. Some on the right side as well on inner thigh. Does move throughout the day, is sore to walk up stairs (ethan horse like pain in front of thighs). Takes naproxen two times a day to help with pain for the last couple days.   Next pharmacist intervention date: 5/21/21   Intervention: R               Assessment/Plan:  Juvenal is tolerating idelalisib therapy with stable side effects (not improving, but not getting worse for the most part). Do have some new swelling in his upper legs by the groin area (similar to previous swollen lymph nodes). Denied any swelling in lower legs/calves/ankles at this time. Does not report pain unless going up stairs. Taking naproxen twice daily with some relief. Encouraged him to continue that and continue to move as able.    Follow-Up:  Will review appointment with Dr. Crooks 5/21 and then plan to call for next assessment at end of June.    Cm Diaz, PharmD, BCOP  Oral Chemotherapy Pharmacist  [phone]

## 2021-06-17 NOTE — TELEPHONE ENCOUNTER
Telephone Encounter by Bronwyn Romero at 10/19/2020  2:27 PM     Author: Bronwyn Romero Service: -- Author Type: Financial Resource Guide    Filed: 10/19/2020  2:46 PM Encounter Date: 10/13/2020 Status: Signed    : Bronwyn Romero (Financial Resource Guide)       Prior Authorization Approval     Authorization Effective Date: 9/17/2020  Authorization Expiration Date: 4/17/2021  Medication: Zydelig  Approved Dose/Quantity: up to 150mg twice a day  Reference #:   [id number]  Insurance Company: [employer]  Expected CoPay:     $24  CoPay Card Available:   No  Foundation Assistance Needed:  no  Which Pharmacy is filling the prescription (Not needed for infusion/clinic administered):  [facility]  Pharmacy Notified:  not yet  Patient Notified: VILMA

## 2021-06-17 NOTE — TELEPHONE ENCOUNTER
Thank you for the opportunity to be a part in this patient's oral chemotherapy. The oral chemotherapy pharmacy team will no longer be following this patient for oral chemotherapy. If there are an questions or the plan changes, feel free to contact us.    Cm Diaz, PharmD, [facility]  Oral Chemotherapy Pharmacist  [phone]

## 2021-06-17 NOTE — PROGRESS NOTES
[facility] Hematology and Oncology Progress Note    Patient: Juvenal Blake  MRN: [id number]  Date of Service: April 25, 2018      Assessment and Plan:    1.  Follicular lymphoma: CT scan images were reviewed.  Right inguinal nodes in question have reduced in size.  Suggests reactive and not malignant.  We will continued on maintenance Rituxan.  He will continue this until September 2019.  We will reimage him in 6 months which will be October.  We will see him back in clinic with cycle 6.      2.  Prostate cancer: He is currently undergoing radiation at [facility].  He has 2 weeks left.  Having some proctitis.      ECOG Performance   ECOG Performance Status: 1    Distress Assessment  Distress Assessment Score: 3    Pain  Currently in Pain: No/denies    Diagnosis:    1.  Follicular lymphoma: Diagnosed in April, 2010 . Stage IV diagnosis with bone marrow involvement.  Initial disease involved the cervical, supraclavicular, axillary, mesenteric mass, retroperitoneal nodes, and possibly the manubrium.  Relapse noted on imaging in December 2016.    2.  Prostate cancer:  Pathologic stage T3b prostate cancer.  Positive margins, multiple, and surgery.  3 lymph nodes were negative.  High risk for recurrent disease.    Treatment:    Lymphoma:  He had 6 cycles of bendamustine and Rituxan completed in October, 2010.  He had 2 years of maintenance Rituxan therapy finishing in September, 2012.  Second course of bendamustine plus rituximab started February 20, 2017.  Cycle 6 was completed on July 11, 2017.    He is now on maintenance rituximab.    Prostate: Initial radical prostatectomy.  November 15, 2017.  Prostatic adenocarcinoma [place] 4+3 = 7 with tertiary pattern 5.  Tumor involves 45% of total surface area.  Positive for extensive extraprostatic extension.  Positive for bilateral seminal vesicle invasion.  Multiple margins positive.  Lymphovascular invasion not identified.  Lymph nodes negative.  3 were  examined.  He is now receiving adjuvant radiation.    Interim History:    Juvenal returns today for follow-up visit.  He is currently getting radiation.  This is going okay other than some diarrhea and some pain with hemorrhoids.  No fevers, chills, or night sweats.  No cough, chest pain, or shortness of breath.  Energy and appetite are okay.    Review of Systems:    Constitutional  Constitutional (WDL): Exceptions to WDL  Fatigue: Fatigue relieved by rest  Neurosensory  Neurosensory (WDL): All neurosensory elements are within defined limits  Cardiovascular  Cardiovascular (WDL): All cardiovascular elements are within defined limits  Pulmonary  Respiratory (WDL): Exceptions to WDL  Dyspnea: Shortness of breath with moderate exertion  Gastrointestinal  Gastrointestinal (WDL): All gastrointestinal elements are within defined limits  Genitourinary  Genitourinary (WDL): All genitourinary elements are within defined limits  Integumentary  Integumentary (WDL): Exceptions to WDL (healing from shingles)  Patient Coping  Patient Coping: Accepting  Accompanied by  Accompanied by: Family Member    Past History:    Past Medical History:   Diagnosis Date     Arthritis     shoulder per [facility]     GERD (gastroesophageal reflux disease)      H/O pyloric stenosis     as an infant per H & P      Inguinal hernia     per [facility] & P      Lazy eye     per H & P      Non Hodgkin's lymphoma      Prostate CA      Sleep apnea     CPAP        Physical Exam:    Recent Vitals 4/25/2018   Height -   Weight 257 lbs 11 oz   BSA (m2) -   /76   Pulse 61   Temp 98.6   Temp src 1   SpO2 96   Some recent data might be hidden     General: patient appears stated age of 52 y.o.. Nontoxic and in no distress.   HEENT: Head: atraumatic, normocephalic. Sclerae anicteric.  Chest:  Normal respiratory effort.    Cardiac: No edema.  Abdomen: abdomen is non-distended  Extremities: normal tone and muscle bulk.   Skin: no lesions or rash. Warm and dry.   CNS: alert  and oriented. Grossly non-focal.   Psychiatric: normal mood and affect.     Lab Results:    Recent Results (from the past 168 hour(s))   POCT creatinine   Result Value Ref Range    POC Creatinine 1.0 mg/dL   POCT GFR   Result Value Ref Range    POC GFR AMER AF HE >60  >60 mL/min/1.73m2    POC GFR NON AMER AF >60  >60 mL/min/1.73m2     Imaging:    CT scan images were personally reviewed.  A few right inguinal nodes are smaller.    Ct Abdomen Pelvis Without Oral With Iv Contrast    Result Date: 4/23/2018  CT ABDOMEN PELVIS WO ORAL W IV CONTRAST 4/23/2018 9:11 AM     INDICATION: Lymphoma. Follow-up of prominent right inguinal nodes. TECHNIQUE: CT abdomen and pelvis. Multiplanar reformation images (MPR). Dose reduction techniques were used. IV CONTRAST: Iohexol (Omni) 100 mL COMPARISON: 01/22/2018 FINDINGS: LUNG BASES: Negative. ABDOMEN: Normal pancreas, liver, gallbladder, and adrenal glands. The spleen is minimally enlarged though has not significantly changed in size from the comparison study. Simple cysts are again seen in both kidneys. Smaller hypoattenuating renal lesions are too small to characterize. No hydronephrosis or hydroureter. Normal stomach. Normal caliber of the small bowel. Retroperitoneal and mesenteric stranding have not significantly changed from the comparison study. No new upper abdominal lymphadenopathy. PELVIS: Nondistended urinary bladder with circumferential wall thickening. Status post prostatectomy. There is mild wall thickening of the ascending, transverse, descending, and sigmoid colon and rectum. Right inguinal lymph nodes have decreased in size.  For example, a 6 mm right inguinal lymph node previously measured 14 mm. No new pelvic lymphadenopathy. MUSCULOSKELETAL: Negative.     CONCLUSION: 1.  Right inguinal lymph nodes have slightly decreased in size. No new lymphadenopathy in the chest, abdomen, or pelvis. Retroperitoneal and mesenteric stranding has not changed. The spleen is  minimally enlarged though has not significantly changed in size from the comparison study. 2.  Wall thickening of the colon and rectum may be related to an inflammatory or infectious colitis and proctitis. 3.  Circumferential wall thickening of the urinary bladder may be related to underdistention. Cystitis should be excluded.      Signed by: Rob Crooks MD

## 2021-06-17 NOTE — PROGRESS NOTES
[facility] Hematology and Oncology Progress Note    Patient: Juvenal Blake  MRN: [id number]  Date of Service: May 21, 2021      Assessment and Plan:    1.  Follicular lymphoma: I personally reviewed his PET/CT images.  When compared to 4 months ago, he has evidence of recurrent disease, primarily in the abdomen.  Images were reviewed with the patient.  I also discussed his case today with his treating physician at [facility].  He is going to be referred back to the cellular therapy program for treatment/clinical trial enrollment.  At this point, I think the treatment options of study are limited.  Since he is symptomatic (abdominal pain) going to give him a 5-day prednisone burst.  Will await to see the plan after he is seen at the [facility].  Follow-up will be based on that.    2.  Prostate cancer: PSA will be checked today.  PSA level is down a little from March.  Is been stable for about 6 months.  No evidence of recurrent disease on his PET scan he just had.  This is not necessarily due to recurrent prostate cancer.  We will continue to monitor biochemically.    3.  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  His most recent infusion was on April 22.  We will cancel them going forward given possible immunotherapy treatment in the future.    4.  Urinary incontinence: We decreased his Detrol when he started the idelalisib.  If he is having more symptoms we will increase that back up to 4 mg daily.    Total time spent on this visit today including chart review, discussion with other providers, meeting with the patient, and documentation was 55 minutes.    ECOG Performance        Distress Assessment  Distress Assessment Score: 8(Wife, breast cancer)    Pain  Currently in Pain: Yes  Location: Shoulders, stomach    Diagnosis:    1.  Follicular lymphoma: Diagnosed in April, 2010 . Stage IV diagnosis with bone marrow involvement.   Initial disease involved the cervical, supraclavicular, axillary, mesenteric mass, retroperitoneal nodes, and possibly the manubrium.  Relapse noted on imaging in December 2016.  Axillary node biopsy from February 2017 shows low-grade follicle center cell lymphoma.  Second relapse on imaging January 2019.  Repeat biopsy January 7, 2019 of a right medial thigh lymph node shows recurrent follicular lymphoma.  Grade 2.  Third relapse on imaging in May, 2020. Pathologic confirmation May 28, 2020 from a right axillary lymph node excisional biopsy.  Fourth relapse May 2021    2.  Prostate cancer:  Pathologic stage T3b prostate cancer.  Positive margins, multiple, and surgery.  3 lymph nodes were negative.  High risk for recurrent disease.  He is being followed at [facility].  [place] 4+3 = 7.  Tertiary Maricel pattern 5 and preoperative PSA of 27.     3.  Hypogammaglobulinemia: He has received intermittent doses of IVIG.    Treatment:    Lymphoma:    He had 6 cycles of bendamustine and Rituxan completed in October, 2010.  He had 2 years of maintenance Rituxan therapy finishing in September, 2012.    Second course of bendamustine plus rituximab started February 20, 2017.  Cycle 6 was completed on July 11, 2017.  Then had maintenance rituximab through January, 2019.    O-CHOP started at second relapse.  Cycle 1 given April 23, 2019.  Cycle 6 given August 16, 2019.  Haplo NAM-NK therapy at [facility] September 1, 2020     Iidelalisib started October 21, 2020 at  Third rrelapse.  150 mg twice daily.       Prostate:   Initial radical prostatectomy and bilateral lymph node dissection.  November 15, 2017.  Prostatic adenocarcinoma [place] 4+3 = 7 with tertiary pattern 5.  Tumor involves 45% of total surface area.  Positive for extensive extraprostatic extension.  Positive for bilateral seminal vesicle invasion.  Multiple margins positive.  Lymphovascular invasion not identified.  Perineural invasion was noted. Lymph nodes  negative. 3 were examined (2 right obturator and 1 left obturator).    Completed radiation to the prostate fossa and pelvic lymph nodes at [facility] early May 2018.  He received 4500 cGy in 25 fractions.  He then had 2340 cGy boost in 13 fractions to the prostatic fossa, for a total dose of 6240 cGy in 38 treatment fractions delivered over 54 days.  He did not receive hormonal therapy.    Interim History:    Juvenal returns today for follow-up visit.  Generally stable.  Still having a fair amount of symptoms from the idelalisib.  These include nausea.  He has occasional vomiting.  He also has been having some intermittent abdominal pain lately.  No fevers, chills, night sweats.  No unintentional weight loss.    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 Nontoxic and in no distress.   HEENT: Head: atraumatic, normocephalic. Sclerae anicteric.  Chest:  Normal respiratory effort.   Cardiac:  No edema.   Abdomen: abdomen is non-distended  Extremities: normal tone and muscle bulk.  Skin: no lesions or rash. Warm and dry.   CNS: alert and oriented. Grossly non-focal.   Psychiatric: normal mood and  affect.   Nodes: No palpable cervical or supraclavicular nodes.    Lab Results:    Recent Results (from the past 240 hour(s))   POCT Glucose    Collection Time: 05/17/21  8:10 AM    Specimen: Capillary; Blood   Result Value Ref Range    Glucose 102 70 - 139 mg/dL   PSA, Diagnostic (Prostatic-Specific Antigen)    Collection Time: 05/21/21  8:27 AM   Result Value Ref Range    PSA 0.7 0.0 - 3.5 ng/mL   Comprehensive Metabolic Panel    Collection Time: 05/21/21  8:27 AM   Result Value Ref Range    Sodium 142 136 - 145 mmol/L    Potassium 4.1 3.5 - 5.0 mmol/L    Chloride 104 98 - 107 mmol/L    CO2 28 22 - 31 mmol/L    Anion Gap, Calculation 10 5 - 18 mmol/L    Glucose 123 70 - 125 mg/dL    BUN 16 8 - 22 mg/dL    Creatinine 1.12 0.70 - 1.30 mg/dL    GFR MDRD Af Amer >60 >60 mL/min/1.73m2    GFR MDRD Non Af Amer >60 >60 mL/min/1.73m2    Bilirubin, Total 0.4 0.0 - 1.0 mg/dL    Calcium 9.1 8.5 - 10.5 mg/dL    Protein, Total 6.9 6.0 - 8.0 g/dL    Albumin 3.9 3.5 - 5.0 g/dL    Alkaline Phosphatase 88 45 - 120 U/L    AST 26 0 - 40 U/L    ALT 27 0 - 45 U/L   HM1 (CBC with Diff)    Collection Time: 05/21/21  8:27 AM   Result Value Ref Range    WBC 15.1 (H) 4.0 - 11.0 thou/uL    RBC 4.16 (L) 4.40 - 6.20 mill/uL    Hemoglobin 12.2 (L) 14.0 - 18.0 g/dL    Hematocrit 38.0 (L) 40.0 - 54.0 %    MCV 91 80 - 100 fL    MCH 29.3 27.0 - 34.0 pg    MCHC 32.1 32.0 - 36.0 g/dL    RDW 14.8 (H) 11.0 - 14.5 %    Platelets 93 (L) 140 - 440 thou/uL    MPV 9.4 8.5 - 12.5 fL   Manual Differential    Collection Time: 05/21/21  8:27 AM   Result Value Ref Range    Total Neutrophils % 28 (L) 50 - 70 %    Lymphocytes % 55 (H) 20 - 40 %    Monocytes % 8 2 - 10 %    Eosinophils %  8 (H) 0 - 6 %    Basophils % 0 0 - 2 %    Myelocytes % 1 <=1 %    Immature Granulocyte % - Manual 1 (H) <=0 %    Total Neutrophils Absolute 4.2 2.0 - 7.7 thou/ul    Lymphocytes Absolute 8.3 (H) 0.8 - 4.4 thou/uL    Monocytes Absolute 1.2 (H) 0.0 - 0.9 thou/uL    Eosinophils  Absolute 1.2 (H) 0.0 - 0.4 thou/uL    Basophils Absolute 0.0 0.0 - 0.2 thou/uL    Myelocytes Absolute 0.2 (H) <=0.1 thou/uL    Immature Granulocyte Absolute - Manual 0.2 (H) <=0.0 thou/uL    Platelet Estimate Decreased (!) Normal      Imaging:    Nm Pet Ct Skull To Mid Thigh    Result Date: 5/17/2021  EXAM: NM PET CT SKULL TO MID THIGH LOCATION: [facility] DATE/TIME: 5/17/2021 10:03 AM INDICATION: Subsequent treatment planning and restaging for follicular lymphoma grade I-II, lymph nodes of multiple sites. Status post chemotherapy, currently receiving immunotherapy. Monitor treatment response COMPARISON: FDG PET/CT dated 01/19/2021 and MRI brain dated 03/30/2021 TECHNIQUE: Serum glucose level 102 mg/dL. One hour post intravenous administration of 11.5 mCi F-18 FDG, PET imaging was performed from the skull base to the mid thighs utilizing attenuation correction with concurrent axial CT and PET/CT image fusion. Dose reduction techniques were used. FINDINGS: Increasing size and metabolic activity of bilateral retroperitoneal (max SUV 10.5, previously 4.9 and right greater than left inguinal (max SUV 7.1, previously 3.4) lymph nodes with development of left supraclavicular (max SUV 3.0), bilateral retropectoral (max SUV 6.4), upper pretracheal station 2 (max SUV 6.0), bilateral lower paratracheal station 4R (max SUV 6.0), subcarinal station 7 (max SUV 5.9), right hilar station 10R (max SUV 5.5), left greater than right paraspinal (max SUV 5.6), retrocrural (max SUV 9.0), portacaval/peripancreatic (max SUV 7.0), mesenteric (Max SUV 7.1), common iliac (max SUV 8.3), left greater than right external iliac (max SUV 4.3), and left inguinal (max SUV 5.4) lymph nodes with diffuse FDG uptake throughout  the splenic parenchyma (max SUV 4.5), which is greater than liver background (max SUV 4.1) suspicious for lymphomatous involvement progression of disease.  Mild carotid artery bifurcation calcification.  Right chest port with tip terminating near the superior cavoatrial junction. Mild diffuse hepatic steatosis. Right renal cyst. Prostatectomy. Pelvic phleboliths. Multilevel degenerative changes of spine.     Increasing metabolic activity of pre-existing lymph nodes and development of a hypermetabolic lymph nodes above and below the diaphragm with involvement of the splenic parenchyma suspicious for progression of disease.      Signed by: Rob Crooks MD

## 2021-06-17 NOTE — TELEPHONE ENCOUNTER
"Oncology Distress Screening Follow-up  Clinical Social Work  [facility]    Identified Concern and Score From Distress Screening: How concerned are you about feeling anxious or very scared? : 9    Date of Distress Screenin21    Data: Juvenal Blake is a 55 year old year old man diagnosed with NHL with plan for a FATE 516 cellular infusion. Per BMT record, Pt's  infusion has been postponed due to fever.    Intervention: On 21, SW followed up with pt to discuss concern and provide emotional support. Pt discussed disappointment with delay of infusion; however, he expresses that he \"feels better\" after speaking with the BMT team yesterday. He states \"as long as I have a plan in place, I feel ok.\" Pt did identify some increase in anxiety symptoms over the last week and reports he is able to cope appropriately. Pt acknowledged that his wife is now off work for the summer and appreciates her support. Pt has shared that his main coping mechanisms are \"not dwelling too long\" on his current feelings, getting his affairs in order, and talking with his wife. Pt has also shared that he enjoys gardening, riding his motorcycle, going on walks, watching movies, and working on a scanning/uploading photos project.Pt does not have any further questions or concerns at this time. SW provided empathetic listening, validation of concerns, and encouragement. SW encouraged pt to contact SW for support, questions and/or resources.     Education Provided: Self-care education    Follow-up Required: None needed at this time.       CITLALLI Cunningham, [facility]  Adult Blood & Marrow Transplant   Phone: [phone]  Pager: [phone]      "

## 2021-06-18 LAB
BACTERIA SPEC CULT: NO GROWTH
SPECIMEN SOURCE: NORMAL

## 2021-06-19 NOTE — LETTER
Letter by Natalia Castro MD at      Author: Natalia Castro MD Service: -- Author Type: --    Filed:  Encounter Date: 11/25/2019 Status: Signed         November 25, 2019     Carlos New MD  [address]  [facility] [address]    Patient: Juvenal Blake   MR Number: [id number]   YOB: 1965   Date of Visit: 11/25/2019     Dear Dr. Virgen MD:    Thank you for referring Juvenal Blake to me for evaluation.  His immunodeficiency is likely secondary to his chemotherapy.  However, I am worried about his dust mite allergy causing his cough.  I added montelukast in hopes that this will improve some of his symptoms.  I have included my note for your review.    If you have questions, please do not hesitate to call me.     Sincerely,        Natalia Castro MD        CC  No Recipients    Progress Notes:Chief complaint: Possible immunodeficiency    History of present illness: This is a pleasant 54-year-old gentleman with a history of lymphoma, chronic sinus disease and allergic rhinitis to dust mite that I was asked to see by Dr. New for possible immunodeficiency.  He last received chemotherapy in August.  He states he will now be holding on chemotherapy for quite some time.  He had his immunoglobulins checked prior to his chemotherapy infusion IgG was 390, IgA was 10 and Ig was 7.  Prior to that they were checked in March with similar values.  No other immunodeficiency work-up was done.  He has received several doses of IVIG under the direction of his oncologist per the request of his ENT physician given his chronic sinusitis and cough.  Last infusion was 2 weeks ago.  Despite this, he feels he is coming down with a respiratory cold.  Previously he was diagnosed with multiple bouts of bronchitis.  He was seen by pulmonary who performed a CT of the chest.  No bronchiectasis was found.  He reports that he continues to have this cough.  He states he has had it off and on for about 12  years.  That is when he was originally diagnosed with lymphoma.  He describes the cough is coarse.  Sometimes he coughs to the point of gagging himself.  He does cough in his sleep.  Albuterol does provide some temporary relief.  He was on Qvar but with talking with the patient he was not using it regularly.  He would use it more as needed and seemed to not understand how to use this regularly.  In office spirometry was done and was normal.  No nitric oxide level was done that I can find.  He does have a repeat visit with pulmonary with a complete pulmonary function test set up for the end of December.  He was tested for allergies at an outside allergist.  Positive to dust mites and he does work as a .  He states he has a lot of exposures to dust mites.  He states doxycycline seem to be the most effective antibiotic for the cough.  He denies any wheezing.  Occasionally becomes short of breath with the cough. He denies any other infections.  He states he has not had gastrointestinal infections or skin infections.  He did have shingles.  No family history of immunodeficiency he does not think he has required a blood transfusion.  He recently received the pneumococcal 13 vaccine.  He also received the Tdap in 2013.    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  No pets at home.  Lives in a home with central air    Family history: Negative for immunodeficiency    Review of Systems performed as above and the remainder is negative.         Current Outpatient Medications:   ?  albuterol (PROAIR HFA;PROVENTIL HFA;VENTOLIN HFA) 90 mcg/actuation inhaler, Inhale 2 puffs every 6 (six) hours as needed for wheezing., Disp: , Rfl:   ?  azelastine (ASTELIN) 137 mcg (0.1 %) nasal spray, 1 spray into each nostril 2 (two) times a  "day., Disp: , Rfl: 11  ?  beclomethasone (QVAR) 80 mcg/actuation inhaler, Inhale 1 puff 2 (two) times a day as needed.    , Disp: , Rfl:   ?  fluticasone propionate (FLONASE) 50 mcg/actuation nasal spray, , Disp: , Rfl:   ?  lidocaine-prilocaine (EMLA) cream, Apply to port 1/2 to 1 hour prior to accessing., Disp: 30 g, Rfl: 2  ?  naproxen (NAPROSYN) 500 MG tablet, Take 500 mg by mouth as needed., Disp: , Rfl: 2  ?  omeprazole (PRILOSEC) 20 MG capsule, Take 1 capsule (20 mg total) by mouth 2 (two) times a day before meals. (Patient taking differently: Take 40 mg by mouth 2 (two) times a day before meals.    ), Disp: 60 capsule, Rfl: 2  ?  psyllium (METAMUCIL) 3.4 gram packet, Take 1 packet by mouth daily., Disp: , Rfl:   ?  tolterodine (DETROL LA) 4 MG ER capsule, Take 4 mg by mouth daily., Disp: , Rfl: 3  ?  VIRTUSSIN AC  mg/5 mL liquid, TK 10 ML PO EVERY 4 HOURS AS NEEDED FOR COUGH., Disp: , Rfl: 0  ?  doxycycline (VIBRAMYCIN) 100 MG capsule, TK 1 C BID TO TREAT INFECTION FOR 10 DAYS, Disp: , Rfl: 1  ?  mometasone-formoterol (DULERA) 200-5 mcg/actuation HFAA inhaler, Inhale 2 puffs 2 (two) times a day., Disp: 1 Inhaler, Rfl: 1  ?  montelukast (SINGULAIR) 10 mg tablet, Take 1 tablet (10 mg total) by mouth at bedtime., Disp: 30 tablet, Rfl: 1  No current facility-administered medications for this visit.     Facility-Administered Medications Ordered in Other Visits:   ?  HYDROcodone-acetaminophen  mg per tablet 1-2 tablet (NORCO ), 1-2 tablet, Oral, Q6H PRN, Itzel Edwards, CNS  ?  naloxone injection 0.2-0.4 mg (NARCAN), 0.2-0.4 mg, Intravenous, PRN **OR** naloxone injection 0.2-0.4 mg (NARCAN), 0.2-0.4 mg, Intramuscular, PRN, Itzel Edwards, CNS    Allergies   Allergen Reactions   ? Zofran (As Hydrochloride) [Ondansetron Hcl] Itching       /80   Pulse 88   Ht 5' 8.5\" (1.74 m)   Wt (!) 238 lb (108 kg)   BMI 35.66 kg/m     Gen: Pleasant male not in acute distress  HEENT: Eyes no erythema " of the bulbar or palpebral conjunctiva, no edema. Ears: TMs well visualized, no effusions. Nose: No congestion, mucosa normal. Mouth: Throat clear, no lip or tongue edema.   Cardiac: Regular rate and rhythm, no murmurs, rubs or gallops  Respiratory: Clear to auscultation bilaterally, no adventitious breath sounds  Lymph: No supraclavicular or cervical lymphadenopathy  Skin: No rashes or lesions  Psych: Alert and oriented times 3    FENO: 23    Impression report and plan:    1.  Cough  2.  Immunodeficiency    This is likely secondary to his chemotherapy.  He received IVIG 2 weeks ago.  For this reason, I cannot perform any laboratory work regarding immunodeficiency today.  I would recommend in 4 weeks that he have IgG, IgM and IgA checked.  I would also like him to check his specific antibody responses to tetanus and pneumococcal.  It can take some time for his immune system to recover from chemotherapy.  If he needs further management, I recommend follow-up with an immunologist at [facility].  For his cough, I am concerned perhaps about an asthmatic component.  His spirometry was normal and he was not using his controller medication correctly.  For this reason I would like him to use Dulera 2 puffs twice daily.  Instruction given how to use this medication properly.  He should use this for 1 month.  I would also like him to use montelukast given his dust mite allergy in his profession.  I cautioned him to the rare side effect of mood disturbance.  In 1 month, follow-up at that time and make a long-term plan from there pending his cough.  IVIG management per his oncologist.

## 2021-06-19 NOTE — LETTER
Letter by Carlos New MD at      Author: Carlos New MD Service: -- Author Type: --    Filed:  Encounter Date: 11/18/2019 Status: Signed         Cole Sandoval MD  [address] [place] [id number]                                  November 18, 2019    Patient: Juvenal Blake   MR Number: [id number]   YOB: 1965   Date of Visit: 11/18/2019     Dear Dr. Jaime MD:    Thank you for referring Juvenal Blake to me for evaluation. Below are the relevant portions of my assessment and plan of care.    If you have questions, please do not hesitate to call me. I look forward to following Juvenal along with you.    Sincerely,        Carlos New MD            MD Tierra Alvarado MD Sofer, Avraham, MD  11/18/2019 11:47 AM  Sign when Signing Visit  Pulmonary Clinic Outpatient Consultation    Assessment and Plan:   54 year old man with history of low-grade follicular lymphoma with progression, currently on CHOP chemotherapy, prostate cancer, GERD, JAZLYN currently using CPAP, chronic sinusitis, presenting for evaluation of chronic coughing at the request of his oncologist. His symptoms seem to improve with IVIg and antibiotics (most recently, doxycycline). His lungs looked normal on CT chest from last April and lungs are clear today. Office spirometry did not show any evidence of obstruction. I am not sure how much his lungs are contributing to his cough but we will do a high-resolution CT scan to make sure he does not have any bronchiectasis or other parenchymal lung disease as a complication of his low immunoglobulin levels. At this point it seems that his cough is most likely related to chronic sinus disease, with possible contribution from GERD and JAZLYN.     Recommendations:  - OK to continute Qvar for now. Rinse/gargle after use. Not sure he actually needs this but will need to do full PFTs  - full PFTs next visit  - continue GERD treatment with PPI two times a day   -  referral to allergy/immunology for further workup of markedly low IgA, IgG and IgM levels. This could all be due to lymphoma and recent chemotherapy, but he could also have an immunodeficiency which could explain his chronic cough and recurrent bronchitis  - HRCT to evaluate for any bronchiectasis or other parenchymal lung disease  - continue follow up with [facility] for chronic sinus disease  - continue the flonase, astelin nasal sprays per ENT  - no indication for another round of doxy at this time, since he is improving. Cautioned against risk of frequent antibiotic use including development of resistant organisms. He will call if his symptoms worsen and he feels he needs to go back on antibiotics.  - continue IVIg at the discretion of his oncologist Dr. Crooks.  - continue CPAP for JAZLYN and follow up with his sleep medicine team.  - UTD with flu shot. Will give PCV13 today. He will need PSV23 in one year.    Follow up in 2-3 months with full set of PFT's.       CCx: cough, chest congestion    HPI: 54 year old man with history of low-grade follicular lymphoma with progression, currently on CHOP chemotherapy, prostate cancer, GERD, JAZLYN currently using CPAP, chronic sinusitis, presenting for evaluation of chronic coughing at the request of his oncologist. He says he's had issues with cough and bronchitis ever since he was diagnosed with lymphoma, about 12 years ago. It seems to have gotten worse in the last several months. He's gotten several rounds of antibiotics from his PMD (Augmentin, azithromycin, doxy) which seem to help with his cough and symptoms.  He saw ENT over the summer (Dr. Tello, [facility]) and was recommended to receive IVIg treatment due to history of low IgG and IgA levels. The IVIg and doxy does seem to clear up his cough but after he finishes treatment it seems to come back. He denies any significant sinus drainage or post-nasal drip. No wheezing. He takes Qvar, but is not sure if it is  helpful. His GERD is well controlled on two times a day PPI (was on 20mg, recommended to increase to 40mg two times a day by his oncologist, didn't note any difference). Symptoms worse since last chemo treatment, Sept 2019.  He was also seen by allergy/immunology at [facility], Dr. Kumar, but I do not have his office notes.  He denies chest pain, palpitations, fevers, night sweats or chills.     ROS:  A 12-system review was obtained and was negative with the exception of the symptoms endorsed in the history of present illness.    PMH:  Past Medical History:   Diagnosis Date   ? Arthritis     shoulder per H & P   ? GERD (gastroesophageal reflux disease)    ? H/O pyloric stenosis     as an infant per H & P    ? IgA deficiency (H)    ? Inguinal hernia     per H & P    ? Lazy eye     per H & P    ? Non Hodgkin's lymphoma (H)    ? Prostate CA (H)    ? Sleep apnea     CPAP       PSH:  Past Surgical History:   Procedure Laterality Date   ? ABDOMINAL SURGERY      for pyloric stenosis as an infant   ? COLONOSCOPY     ? DISTAL CLAVICLE EXCISION      per H & P    ? HERNIA REPAIR      inguinal   ? port a cath placement  01/2017   ? DE LAP,PROSTATECTOMY,RADICAL,W/NERVE SPARE,INCL ROBOTIC N/A 11/15/2017    Procedure: ROBOTIC ASSISTED RADICAL RETROPUBIC PROSTATECTOMY BILATERAL PELVIC LYMPH NODE DISSECTION ;  Surgeon: Ezio Steele MD;  [facility];  Service: Urology   ? shoulder arthroscopy Left    ? TONSILLECTOMY     ? US LYMPH NODE BIOPSY  1/7/2019       Allergies:  Allergies   Allergen Reactions   ? Zofran (As Hydrochloride) [Ondansetron Hcl] Itching       Family HX:  Family History   Problem Relation Age of Onset   ? Colon cancer Mother         diagnosed in her late 40s   ? Lymphoma Father         non-Hodgkins lymphoma, diagnosed 70s   ? No Medical Problems Sister    ? No Medical Problems Brother    ? No Medical Problems Son    ? No Medical Problems Sister    ? No Medical Problems Son    ? Lung  cancer Maternal Grandmother    ? Lung cancer Maternal Uncle        Social Hx:  Social History     Socioeconomic History   ? Marital status:      Spouse name: Not on file   ? Number of children: Not on file   ? Years of education: Not on file   ? Highest education level: Not on file   Occupational History   ? Not on file   Social Needs   ? Financial resource strain: Not on file   ? Food insecurity:     Worry: Not on file     Inability: Not on file   ? Transportation needs:     Medical: Not on file     Non-medical: Not on file   Tobacco Use   ? Smoking status: Former Smoker     Packs/day: 1.00     Years: 20.00     Pack years: 20.00     Last attempt to quit:      Years since quittin.8   ? Smokeless tobacco: Never Used   Substance and Sexual Activity   ? Alcohol use: Yes     Alcohol/week: 10.0 standard drinks     Types: 10 Cans of beer per week   ? Drug use: No     Types: Marijuana     Comment: none in the past 2 years   ? Sexual activity: Never   Lifestyle   ? Physical activity:     Days per week: Not on file     Minutes per session: Not on file   ? Stress: Not on file   Relationships   ? Social connections:     Talks on phone: Not on file     Gets together: Not on file     Attends Anabaptist service: Not on file     Active member of club or organization: Not on file     Attends meetings of clubs or organizations: Not on file     Relationship status: Not on file   ? Intimate partner violence:     Fear of current or ex partner: Not on file     Emotionally abused: Not on file     Physically abused: Not on file     Forced sexual activity: Not on file   Other Topics Concern   ? Not on file   Social History Narrative   ? Not on file       Current Meds:  Current Outpatient Medications   Medication Sig Dispense Refill   ? albuterol (PROAIR HFA;PROVENTIL HFA;VENTOLIN HFA) 90 mcg/actuation inhaler Inhale 2 puffs every 6 (six) hours as needed for wheezing.     ? azelastine (ASTELIN) 137 mcg (0.1 %) nasal spray 1  "spray into each nostril 2 (two) times a day.  11   ? beclomethasone (QVAR) 80 mcg/actuation inhaler Inhale 1 puff 2 (two) times a day as needed.            ? fluticasone propionate (FLONASE) 50 mcg/actuation nasal spray      ? lidocaine-prilocaine (EMLA) cream Apply to port 1/2 to 1 hour prior to accessing. 30 g 2   ? naproxen (NAPROSYN) 500 MG tablet Take 500 mg by mouth as needed.  2   ? omeprazole (PRILOSEC) 20 MG capsule Take 1 capsule (20 mg total) by mouth 2 (two) times a day before meals. (Patient taking differently: Take 40 mg by mouth 2 (two) times a day before meals.       ) 60 capsule 2   ? psyllium (METAMUCIL) 3.4 gram packet Take 1 packet by mouth daily.     ? tolterodine (DETROL LA) 4 MG ER capsule Take 4 mg by mouth daily.  3   ? VIRTUSSIN AC  mg/5 mL liquid TK 10 ML PO EVERY 4 HOURS AS NEEDED FOR COUGH.  0   ? doxycycline (VIBRAMYCIN) 100 MG capsule TK 1 C BID TO TREAT INFECTION FOR 10 DAYS  1     No current facility-administered medications for this visit.      Facility-Administered Medications Ordered in Other Visits   Medication Dose Route Frequency Provider Last Rate Last Dose   ? HYDROcodone-acetaminophen  mg per tablet 1-2 tablet (NORCO )  1-2 tablet Oral Q6H PRN Itzel Edwards, CNS       ? naloxone injection 0.2-0.4 mg (NARCAN)  0.2-0.4 mg Intravenous PRN Itzel Edwards CNS        Or   ? naloxone injection 0.2-0.4 mg (NARCAN)  0.2-0.4 mg Intramuscular PRN Itzel Edwards, CNS           Physical Exam:  /74   Pulse 76   Resp 24   Ht 5' 9\" (1.753 m)   Wt (!) 242 lb 3.2 oz (109.9 kg)   SpO2 99% Comment: RA  BMI 35.77 kg/m     Gen: awake, alert, oriented, no distress  HEENT: nasal turbinates are unremarkable, no oropharyngeal lesions, no cervical or supraclavicular lymphadenopathy  CV: RRR, no M/G/R  Resp: CTAB, no focal crackles or wheezes  Abd: soft, nontender, no palpable organomegaly  Skin: no apparent rashes  Ext: no cyanosis, clubbing or edema  Neuro: alert, " nonfocal    Labs:  Reviewed  CBC eos  H/H stable  Chem panel unremarkable  IgG 390  IgA 10  IgE normal  IgM 7    Imaging studies:  CT chest April 2019  IMPRESSION:   CONCLUSION:  1.  Progression of lymphadenopathy in the lower retroperitoneum and right side of the pelvis and inguinal canal since 12/31/2018.    PET/CT 9/11/2019  IMPRESSION:   CONCLUSION:  No substantial change since 06/24/2019. No evidence of recurrent or active lymphoma.    PFT's   None available  Spirometry today, best effort (2 of 3)  FEV1 3.25L (90 % predicted)  FVC 3.75L 79%  Ratio 0.87  Flow volume loop normal; does not suggest obstruction.    Carlos (Orestes) MD Virgen  [facility]  Pager [phone]  Clinic [phone]

## 2021-06-19 NOTE — PROGRESS NOTES
Pt arrived ambulatory to clinic for Cycle # 5 Day # 1 of her chemotherapy regimen.  Port was accessed using aseptic technique without difficulties with excellent blood return.  Administered premedications and Rituxan per MD order.  Pt tolerated infusion well, no s/s of infusion reaction.  Port was flushed with NS and Heparin then de-accessed using 2x2 and papertape.  Pt verbalized understanding of plan of care and return to clinic.

## 2021-06-20 NOTE — PROGRESS NOTES
[facility] Hematology and Oncology Progress Note    Patient: Juvenal Blake  MRN: [id number]  Date of Service: 09/13/18          Reason for Visit    Chief Complaint   Patient presents with     HE Cancer     Lymphoma       Assessment and Plan    1.  Follicular lymphoma, low-grade. Pt is currently on maintenance rituxan. That is going well. No complaints. He will continue that. CT overall shows stable disease. The inguinal node is fluctuating and is a little worse today. I will repeat scan in 4 months.     2. Prostate cancer: s/p surgery and radiation, which finished around May 2018. We will get PSA checked today and send to Dr. Monaco and Dr. Mata.     3. 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  The chemo is usually cumulative. Overall asymptomatic. Continue to monitor.  Slowly getting worse        ECOG Performance   ECOG Performance Status: 0    Distress Assessment  Distress Assessment Score: No distress    Pain  Currently in Pain: No/denies:       Problem List    1. Prostate cancer (H)  PSA, Diagnostic (Prostatic-Specific Antigen)    CT Chest Abdomen Pelvis With Oral With IV Cont   2. Follicular lymphoma grade i, lymph nodes of multiple sites (H)  CT Chest Abdomen Pelvis With Oral With IV Cont      ______________________________________________________________________________    History of Present Illness    Diagnosis:     1. Follicular lymphoma: Diagnosed in April, 2010 . Stage IV diagnosis with bone marrow involvement. Initial disease involved the cervical, supraclavicular, axillary, mesenteric mass, retroperitoneal nodes, and possibly the manubrium.    2.  Prostate cancer:  Pathologic stage T3b prostate cancer.  Positive margins, multiple, and surgery.  3 lymph nodes were negative.  High risk for recurrent disease    Treatment:     Lymphoma:  He had 6 cycles of bendamustine and Rituxan completed in October, 2010.  He had 2 years of maintenance Rituxan therapy finishing in September, 2012.  Second  course of bendamustine plus rituximab started February 20, 2017.  Cycle 6 was completed on July 11, 2017.    He is now on maintenance rituximab.     Prostate: Initial radical prostatectomy.  November 15, 2017.  Prostatic adenocarcinoma Kennard 4+3 = 7 with tertiary pattern 5.  Tumor involves 45% of total surface area.  Positive for extensive extraprostatic extension.  Positive for bilateral seminal vesicle invasion.  Multiple margins positive.  Lymphovascular invasion not identified.  Lymph nodes negative.  3 were examined.  He received adjuvant radiation and finished around May 2018.    Interim History:  Juvenal returns to the clinic. He is doing well. Has no complaints except some sinus/allergy stuff. No lymphadenopathy. No constitutional symptoms. Weight is stable.     Pain Status  Currently in Pain: No/denies    Review of Systems    Constitutional  Constitutional (WDL): All constitutional elements are within defined limits  Weight Loss: to <10% from baseline, intervention not indicated (15lbs)  Neurosensory  Neurosensory (WDL): All neurosensory elements are within defined limits  Eye   Eye Disorder (WDL): All eye disorder elements are within defined limits  Ear  Ear Disorder (WDL): All ear disorder elements are within defined limits  Cardiovascular  Cardiovascular (WDL): Exceptions to WDL  Edema: Yes (ankles)  Pulmonary  Respiratory (WDL): Within Defined Limits (recovering from recent cold virus)  Gastrointestinal  Gastrointestinal (WDL): All gastrointestinal elements are within defined limits  Genitourinary  Genitourinary (WDL): All genitourinary elements are within defined limits  Musculoskeletal and Connective Tissue  Musculoskeletal and Connetive Tissue Disorders (WDL): Exceptions to WDL  Arthralgia: Mild pain (hands)  Integumentary  Integumentary (WDL): All integumentary elements are within defined limits  Patient Coping  Patient Coping: Accepting  Distress Assessment  Distress Assessment Score: No  distress  Accompanied by  Accompanied by: Alone    Past History  Past Medical History:   Diagnosis Date     Arthritis     shoulder per H & P     GERD (gastroesophageal reflux disease)      H/O pyloric stenosis     as an infant per H & P      Inguinal hernia     per H & P      Lazy eye     per H & P      Non Hodgkin's lymphoma (H)      Prostate CA (H)      Sleep apnea     CPAP       Physical Exam    Recent Vitals 9/13/2018   Weight 242 lbs 10 oz   /81   Pulse 54   Temp 97.6   Temp src 1   SpO2 99   Some recent data might be hidden       General: alert, appears stated age and cooperative, here with wife  HEENT: Head: Normocephalic, no lesions, without obvious abnormality.  Pharynx: Dental Hygiene adequate. Normal buccal mucosa. Normal pharynx.  Chest: Normal chest wall and respirations. Clear to auscultation.  Cardiac: regular rate and rhythm, S1, S2 normal, no murmur, click, rub or gallop  Abdomen: abdomen is soft without significant tenderness, masses, organomegaly or guarding  Extremities: normal strength, tone, and muscle mass, patient does have mild swelling bilaterally in his legs.   Skin: normal, port is CDI  CNS: normal without focal findings and mental status, speech normal, alert and oriented x3  Lymphatics: No abnormally enlarged lymph nodes.    Lab Results    Recent Results (from the past 168 hour(s))   POCT creatinine   Result Value Ref Range    POC Creatinine 1.1 mg/dL   POCT GFR   Result Value Ref Range    POC GFR AMER AF HE >60  >60 mL/min/1.73m2    POC GFR NON AMER AF >60  >60 mL/min/1.73m2   Comprehensive Metabolic Panel   Result Value Ref Range    Sodium 140 136 - 145 mmol/L    Potassium 4.0 3.5 - 5.0 mmol/L    Chloride 107 98 - 107 mmol/L    CO2 26 22 - 31 mmol/L    Anion Gap, Calculation 7 5 - 18 mmol/L    Glucose 105 70 - 125 mg/dL    BUN 14 8 - 22 mg/dL    Creatinine 0.94 0.70 - 1.30 mg/dL    GFR MDRD Af Amer >60 >60 mL/min/1.73m2    GFR MDRD Non Af Amer >60 >60 mL/min/1.73m2    Bilirubin,  Total 0.7 0.0 - 1.0 mg/dL    Calcium 9.1 8.5 - 10.5 mg/dL    Protein, Total 6.0 6.0 - 8.0 g/dL    Albumin 4.0 3.5 - 5.0 g/dL    Alkaline Phosphatase 57 45 - 120 U/L    AST 22 0 - 40 U/L    ALT 29 0 - 45 U/L   HM1 (CBC with Diff)   Result Value Ref Range    WBC 4.1 4.0 - 11.0 thou/uL    RBC 4.20 (L) 4.40 - 6.20 mill/uL    Hemoglobin 11.9 (L) 14.0 - 18.0 g/dL    Hematocrit 36.4 (L) 40.0 - 54.0 %    MCV 87 80 - 100 fL    MCH 28.3 27.0 - 34.0 pg    MCHC 32.7 32.0 - 36.0 g/dL    RDW 15.1 (H) 11.0 - 14.5 %    Platelets 175 140 - 440 thou/uL    MPV 9.5 8.5 - 12.5 fL    Neutrophils % 68 50 - 70 %    Lymphocytes % 12 (L) 20 - 40 %    Monocytes % 14 (H) 2 - 10 %    Eosinophils % 6 0 - 6 %    Basophils % 0 0 - 2 %    Neutrophils Absolute 2.8 2.0 - 7.7 thou/uL    Lymphocytes Absolute 0.5 (L) 0.8 - 4.4 thou/uL    Monocytes Absolute 0.6 0.0 - 0.9 thou/uL    Eosinophils Absolute 0.3 0.0 - 0.4 thou/uL    Basophils Absolute 0.0 0.0 - 0.2 thou/uL       Imaging    Ct Chest Abdomen Pelvis Without Oral With Iv Contrast    Result Date: 9/11/2018  CT CHEST, ABDOMEN, AND PELVIS 9/11/2018 8:25 AM      INDICATION: Lymphoma follow-up. TECHNIQUE: CT chest, abdomen, and pelvis. Dose reduction techniques were used. IV CONTRAST: Iohexol (Omni) 100 mL. COMPARISON: Chest CT 08/13/2018 and CT of the abdomen 04/23/2018. FINDINGS: CHEST: The heart is normal in size. Right chest port terminates in the central SVC. Thoracic aorta is normal in caliber. No suspicious adenopathy in the chest. Lymph nodes remain stable. Central airways appear clear. No infiltrate or pleural effusion. Minimal benign apical scarring and very mild upper lobe predominant paraseptal emphysema. 2 mm subpleural nodule in the left lower lobe on series 3, image #49 remains unchanged. No suspicious pulmonary nodules or masses.  ABDOMEN: The liver, gallbladder, pancreas, and adrenal glands appear normal. Spleen remains stable measuring up to 13.6 cm. Well-circumscribed low-attenuation  lesions and presumed cysts throughout the kidneys are stable. No suspicious renal lesion. No hydronephrosis. The stranding within the central mesentery and retroperitoneum is stable and likely due to posttreatment changes. A 1.7 x 1.5 cm right inguinal node is slightly increased from 1.4 x 1.0 cm on 04/23/2018 and additional subcentimeter inguinal nodes are stable. Mild wall thickening of the rectum to sigmoid colon without suspicious mass is stable. Bowel is normal in caliber and wall thickness. Normal appendix. No ascites. Small fat-containing umbilical hernia. No additional recurrent suspicious adenopathy. Residual lymph nodes remain stable including largest measuring 8 mm in short axis posterior to the third portion of the duodenum inferiorly, 7 mm in short axis anterior to the mid abdominal aorta. PELVIS: Mild diffuse bladder wall thickening is stable. No pelvic mass or pelvic fluid collection. MUSCULOSKELETAL: No suspicious osseous lesions. Partially healed left lateral rib fracture is stable.     CONCLUSION: 1.  1.7 x 1.5 cm right inguinal node is slightly increased from 1.4 x 1.0 cm on 04/23/2018. Remainder of the lymph nodes remain stable without additional suspicious adenopathy in the chest, abdomen, pelvis.        Signed by: Madhavi Kurtz, CNP

## 2021-06-20 NOTE — LETTER
Letter by Pauline Barbosa LPN at      Author: Pauline Barbosa LPN Service: -- Author Type: --    Filed:  Encounter Date: 5/27/2020 Status: (Other)         May 27, 2020     Patient: Juvenal Blake   YOB: 1965   Date of Visit: 5/27/2020       To Whom It May Concern:    Juvenal Blake was required to self quarantine after Covid testing on 5/21/2020.  If you have any questions or concerns, please don't hesitate to call.    Sincerely,        Electronically signed by Rob Farrell MD

## 2021-06-20 NOTE — LETTER
Letter by Lu Moreland RN at      Author: Lu Moreland RN Service: -- Author Type: --    Filed:  Encounter Date: 4/7/2020 Status: (Other)                      April 7, 2020       To whom it may concern:     This letter is in regards to Mr Juvenal Blake, spouse of employee Nancy Blake.  Juvenal has been  diagnosed with Follicular Lymphoma and completed chemotherapy within the last six months.  Due to  the effects of chemotherapy he is considered be an immunocompromised individual with low blood  antibodies.  For this reason is it our recommnedation that Nancy Blake not attend the workplace where  potential Covid-19 exposure could occur and be consequently transferred to Juvenal.           Please call with questions at [phone].       Sincerely,        Dr. Rob Crooks MD

## 2021-06-20 NOTE — LETTER
Letter by Lu Moreland RN at      Author: Lu Moreland RN Service: -- Author Type: --    Filed:  Encounter Date: 4/7/2020 Status: (Other)                 April 7, 2020      To whom it may concern:    This letter is in regards to . Juvenal Blake, spouse of employee Nancy Blake.  Juvenal has been diagnosed with Follicular Lymphoma and has completed chemotherapy within the last six months.  Due to the effects of chemotherapy, he is considered to be an immunocompromised individual with low blood antibodies.  For this reason, it is our recommendation that Nancy Blake not attend the workplace where potential Covid-19 exposure could occur and consequently be transferred to Juvenal.       Our office can be contacted at [phone] with any questions.        Sincerely,        Dr. Rob Crooks MD

## 2021-06-20 NOTE — LETTER
Letter by Carlos New MD at      Author: Carlos New MD Service: -- Author Type: --    Filed:  Encounter Date: 2/11/2020 Status: (Other)         Cole Sandoval MD  [address] [place] [id number]                                  February 11, 2020    Patient: Juvenal Blake   MR Number: [id number]   YOB: 1965   Date of Visit: 2/11/2020     Dear Dr. Jaime MD:    Thank you for referring Juvenal Blake to me for evaluation. Below are the relevant portions of my assessment and plan of care.    If you have questions, please do not hesitate to call me. I look forward to following Juvenal along with you.    Sincerely,        Carlos New MD          CC  MD Natalia Alvarado MD Sofer, Avraham, MD  2/11/2020  3:38 PM  Sign when Signing Visit  Pulmonary Clinic Follow-up Visit    Assessment and Plan:   54 year old man with history of low-grade follicular lymphoma with progression, currently on CHOP chemotherapy, prostate cancer, GERD, JAZLYN currently using CPAP, chronic sinusitis, presenting for follow up of chronic cough. His cough has improved with antibiotics and IVIg treatment. He is feeling well today and lung exam is normal. PFTs and HRCT were both unremarkable. I do not think he has any significant underlying lung disease contributing to his cough and he did not have any symptomatic benefit from ICS therapy. The cough may be due to chronic sinus disease with possible contribution from GERD.      Recommendations:  - continue albuterol as needed. Off ICS at this point.   - continue GERD treatment with PPI two times a day   - continue IVIg per Dr. Crooks  - continue to follow up with allergy immunology. Repeat Ig levels at some point.  - continue follow up with [facility] for chronic sinus disease  - continue the flonase, astelin nasal sprays per ENT  - continue CPAP for JAZLYN and follow up with his sleep medicine team.  - UTD with flu shot and PSV13. Should have  "PSV23 in 1 year.      Follow up in 6 months.      Carlos New MD (Avi)  [facility] Pulmonary & Critical Care  Pager [phone]  Clinic [phone]      CCx: cough follow up    HPI: Interim history: I last saw Juvenal on 11/18/2019. Since that time, he was seen by allergy and oncology. He continues on monthly IVIg infusions through Dr. Crooks.  HRCT did not show any significant underlying lung disease.   He is not taking qvar or dulera consistently.  He has chronic SAMS that is stable and does not limit him. He is able to carry out most ADLs and exercise from time to time but says he couldn't \"run a marathon.\"  Minimal cough, feels the IVIg is helping.   He uses the albuterol rarely.     ROS:  A 12-system review was obtained and was negative with the exception of the symptoms endorsed in the history of present illness.    PMH:  Past Medical History:   Diagnosis Date   ? Arthritis     shoulder per H & P   ? GERD (gastroesophageal reflux disease)    ? H/O pyloric stenosis     as an infant per H & P    ? Inguinal hernia     per H & P    ? Lazy eye     per H & P    ? 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 Low serum IgG for age    ? Non Hodgkin's lymphoma (H)    ? Prostate CA (H)    ? Sleep apnea     CPAP       PSH:  Past Surgical History:   Procedure Laterality Date   ? ABDOMINAL SURGERY      for pyloric stenosis as an infant   ? COLONOSCOPY     ? DISTAL CLAVICLE EXCISION      per H & P    ? HERNIA REPAIR      inguinal   ? port a cath placement  01/2017   ? SC LAP,PROSTATECTOMY,RADICAL,W/NERVE SPARE,INCL ROBOTIC N/A 11/15/2017    Procedure: ROBOTIC ASSISTED RADICAL RETROPUBIC PROSTATECTOMY BILATERAL PELVIC LYMPH NODE DISSECTION ;  Surgeon: Ezio Steele MD;  Location: [facility] OR;  Service: Urology   ? shoulder arthroscopy Left    ? TONSILLECTOMY     ? US LYMPH NODE BIOPSY  1/7/2019       Allergies:  Allergies   Allergen Reactions   ? Zofran (As Hydrochloride) [Ondansetron Hcl] " Itching       Family HX:  Family History   Problem Relation Age of Onset   ? Colon cancer Mother         diagnosed in her late 40s   ? Lymphoma Father         non-Hodgkins lymphoma, diagnosed 70s   ? No Medical Problems Sister    ? No Medical Problems Brother    ? No Medical Problems Son    ? No Medical Problems Sister    ? No Medical Problems Son    ? Lung cancer Maternal Grandmother    ? Lung cancer Maternal Uncle        Social Hx:  Social History     Socioeconomic History   ? Marital status:      Spouse name: Not on file   ? Number of children: Not on file   ? Years of education: Not on file   ? Highest education level: Not on file   Occupational History   ? Not on file   Social Needs   ? Financial resource strain: Not on file   ? Food insecurity:     Worry: Not on file     Inability: Not on file   ? Transportation needs:     Medical: Not on file     Non-medical: Not on file   Tobacco Use   ? Smoking status: Former Smoker     Packs/day: 1.     Years: 20.00     Pack years: 20.00     Last attempt to quit:      Years since quittin.1   ? Smokeless tobacco: Never Used   Substance and Sexual Activity   ? Alcohol use: Yes     Alcohol/week: 10.0 standard drinks     Types: 10 Cans of beer per week   ? Drug use: No     Types: Marijuana     Comment: none in the past 2 years   ? Sexual activity: Never   Lifestyle   ? Physical activity:     Days per week: Not on file     Minutes per session: Not on file   ? Stress: Not on file   Relationships   ? Social connections:     Talks on phone: Not on file     Gets together: Not on file     Attends Yazidism service: Not on file     Active member of club or organization: Not on file     Attends meetings of clubs or organizations: Not on file     Relationship status: Not on file   ? Intimate partner violence:     Fear of current or ex partner: Not on file     Emotionally abused: Not on file     Physically abused: Not on file     Forced sexual activity: Not on file  "  Other Topics Concern   ? Not on file   Social History Narrative   ? Not on file       Current Meds:  Current Outpatient Medications   Medication Sig Dispense Refill   ? albuterol (PROAIR HFA;PROVENTIL HFA;VENTOLIN HFA) 90 mcg/actuation inhaler Inhale 2 puffs every 6 (six) hours as needed for wheezing.     ? azelastine (ASTELIN) 137 mcg (0.1 %) nasal spray 1 spray into each nostril 2 (two) times a day.  11   ? beclomethasone (QVAR) 80 mcg/actuation inhaler Inhale 1 puff 2 (two) times a day as needed.            ? doxycycline (VIBRAMYCIN) 100 MG capsule TK 1 C BID TO TREAT INFECTION FOR 10 DAYS  1   ? fluticasone propionate (FLONASE) 50 mcg/actuation nasal spray      ? lidocaine-prilocaine (EMLA) cream Apply to port 1/2 to 1 hour prior to accessing. 30 g 2   ? naproxen (NAPROSYN) 500 MG tablet Take 500 mg by mouth as needed.  2   ? omeprazole (PRILOSEC) 20 MG capsule Take 2 capsules (40 mg total) by mouth 2 (two) times a day before meals. 60 capsule 2   ? psyllium (METAMUCIL) 3.4 gram packet Take 1 packet by mouth daily.     ? tolterodine (DETROL LA) 4 MG ER capsule Take 4 mg by mouth daily.  3   ? VIRTUSSIN AC  mg/5 mL liquid TK 10 ML PO EVERY 4 HOURS AS NEEDED FOR COUGH.  0     No current facility-administered medications for this visit.      Facility-Administered Medications Ordered in Other Visits   Medication Dose Route Frequency Provider Last Rate Last Dose   ? HYDROcodone-acetaminophen  mg per tablet 1-2 tablet (NORCO )  1-2 tablet Oral Q6H PRN Itzel Edwards, CNS       ? naloxone injection 0.2-0.4 mg (NARCAN)  0.2-0.4 mg Intravenous PRN Itzel Edwards CNS        Or   ? naloxone injection 0.2-0.4 mg (NARCAN)  0.2-0.4 mg Intramuscular PRN Itzel Edwards, CNS           Physical Exam:  /68 (Patient Site: Right Arm)   Pulse 74   Resp 16   Ht 5' 8.75\" (1.746 m)   Wt (!) 238 lb 11.2 oz (108.3 kg)   SpO2 96%   BMI 35.51 kg/m     Gen: awake, alert, oriented, no distress  HEENT: nasal " turbinates are unremarkable, no oropharyngeal lesions, no cervical or supraclavicular lymphadenopathy  CV: RRR, no M/G/R  Resp: CTAB, no focal crackles or wheezes  Abd: soft, nontender, no palpable organomegaly  Skin: no apparent rashes  Ext: no cyanosis, clubbing or edema  Neuro: alert, nonfocal    Labs:  Reviewed  CBC no eos  H/H stable  Chem panel unremarkable  IgG 390  IgA 10  IgE normal  IgM 7    Imaging studies:  HRCT Nov 2019  IMPRESSION:      1.  No findings of a diffuse fibrosing lung disorder, large or small airways abnormality.    PFT's  Dec 2019  FEV1/FVC is 77% and is normal.  FEV1 is 3.17L (91%) predicted and is normal.  FVC is 4.11L (93%) predicted and normal.  There was no improvement in spirometry after a single inhaled dose of bronchodilator.  TLC is 6.57L (92%) predicted and is normal.  RV is 2.23L (95%) predicted and is normal.  DLCO is 27.26ml/min/hg (96%) predicted and is normal when it is corrected for hemoglobin.  Flow volume loops indicate no abnormalities.     Impression:  Full Pulmonary Function Test is normal.  PFTs are consistent with no obstructive disease.  Spirometry is not consistent with reversibility.  There is no hyperinflation.  There is no air-trapping.  Diffusion capacity when corrected for hemoglobin is  normal.     The ATS criteria for acceptability and reproducibility was met.

## 2021-06-21 DIAGNOSIS — C82.08 FOLLICULAR LYMPHOMA GRADE I, LYMPH NODES OF MULTIPLE SITES (H): Primary | ICD-10-CM

## 2021-06-21 LAB
BACTERIA SPEC CULT: NO GROWTH
SPECIMEN SOURCE: NORMAL

## 2021-06-21 NOTE — PROGRESS NOTES
Juvenal came to chemo infusion this morning for cycle 7 day 1 treatment with maintenance Rituxan.  VSS.  Pt assessed.  Port accessed with good blood return and labs drawn.  Results noted and pt met provision for treatment.  He received treatment as ordered and tolerated it well while in clinic today.  Port was flushed with ns, heparinized then deaccessed.  Juvenal d/c from clinic ambulatory and unaccompanied.

## 2021-06-22 ENCOUNTER — HOSPITAL ENCOUNTER (OUTPATIENT)
Facility: CLINIC | Age: 56
End: 2021-06-22
Attending: INTERNAL MEDICINE | Admitting: INTERNAL MEDICINE
Payer: COMMERCIAL

## 2021-06-22 DIAGNOSIS — C82.90 FOLLICULAR LYMPHOMA (H): Primary | ICD-10-CM

## 2021-06-22 NOTE — PROGRESS NOTES
Juvenal came to chemo infusion this morning for labs/ appt with Dorothy and treatment with maintenance Rituxan. Port accessed with good blood return and labs drawn, line easily flushed NS.  Results noted and pt met provision for treatment.  He received treatment as ordered and tolerated it well while in clinic today.  Port was flushed with ns, heparinized then deaccessed.  Juvenal d/c from clinic ambulatory and stable, accompanied by his wife. He siim to the boardroom to eat lunch before leaving today

## 2021-06-22 NOTE — PROGRESS NOTES
BMT Clinic Note   Jun 16, 2021      Juvenal Blake is a 55 year old year old male diagnosed with NHL.  He has been treated with Bendamustine + Rituxan then Bendamustine +  Rituxan once again the R-CHOP followed by Nam-NK cell infusion. He is enrolled in protocol [id number], which utilizes Cy/Flu as a lymphodepletion regimen as bridge to [place]a.    HPI:  Juvenal is better, diarrhea 3-4 times a day, small amount, less abd pain, no more fever since the weekend.  Overall feels much better, taking oral Vanco.   No new complains.     ROS:  No chest pain. No SOB. Cough a little better. No bleeding. Drinking fluids better    Review of Systems: 8 point ROS negative except as noted above.    Physical Exam:   Blood pressure 127/87, pulse 87, temperature 99.8  F (37.7  C), temperature source Oral, weight 116.5 kg (256 lb 14.4 oz).  General: Non-toxic. Conversant. KPS 70-80  Eyes: : JOE, sclera anicteric   Lungs: CTA bilaterally. No rhonchi today  Cardiovascular: RRR, no M/R/G   Abdominal/Rectal: +protuberant, firm to palpation. + BS  Lymphatics: no edema  Skin: no rashes or petechaie  Neuro: A&O   Additional Findings: Jacobs site NT, no drainage.  Labs:  Lab Results   Component Value Date    WBC 13.0 (H) 06/15/2021    ANEU 3.9 06/15/2021    HGB 9.2 (L) 06/15/2021    HCT 29.7 (L) 06/15/2021    PLT 74 (L) 06/15/2021     06/15/2021    POTASSIUM 4.0 06/15/2021    CHLORIDE 101 06/15/2021    CO2 27 06/15/2021     (H) 06/15/2021    BUN 12 06/15/2021    CR 1.24 06/15/2021    MAG 2.0 06/15/2021    INR 0.97 06/04/2021    AST 25 06/15/2021    ALT 34 06/15/2021     C.diff pos 6/13 6/11/21 Blood cultures: NGTD    6/11/21 CXR: clear  Assessment and Plan:   1.  Lymphoma:  - He had 6 cycles of bendamustine and Rituxan completed in October, 2010. He had 2 years of maintenance Rituxan therapy finishing in September, 2012.  - Second course of bendamustine plus rituximab started February 20, 2017. Cycle 6 was completed on July  11, 2017. Then had maintenance rituximab through January, 2019.  - O-CHOP started at second relapse. Cycle 1 given April 23, 2019. Cycle 6 given August 16, 2019.  [facility], [facility]  - Iidelalisib started October 21, 2020 at [facility]. 150 mg twice daily.  stopped Idelalisib - last dose was on 6/10 at 8am.       - Eligible to proceed with therapy with .    Delay now due to C.diff.  New plan: Begin LD chemotherapy 6/24 (+/-) and receive  cells weekly x 3.  LDH stable. Uric Acid check is normal    No further fevers so should be ok to start LD chemo on Tuesday - Thursday -- this is scheduled. Needs to get started as I think his abdominal pain/firmness is from the progressive lymphoma    2.  HEME:  No need for transfusions today. WBC coming down    3.  :   Initial radical prostatectomy and bilateral lymph node dissection. November 15, 2017. Prostatic adenocarcinoma [place] 4+3 = 7 with tertiary pattern 5. Tumor involves 45% of total surface area. Positive for extensive extraprostatic extension. Positive for bilateral seminal vesicle invasion. Multiple margins positive. Lymphovascular invasion not identified. Perineural invasion was noted. Lymph nodes negative. 3 were examined (2 right obturator and 1 left obturator).    Completed radiation to the prostate fossa and pelvic lymph nodes at [facility] early May 2018. He received 4500 cGy in 25 fractions. He then had 2340 cGy boost in 13 fractions to the prostatic fossa, for a total dose of 6240 cGy in 38 treatment fractions delivered over 54 days. He did not receive hormonal therapy:      4.  ID:  Febrile Friday. C.diff colitis.   Clinically better. Cont oral Vancomycin to complete the course.    COVID negative. RVP + for Rhinovirus  - S/p Rocephin 2gm daily x 3 days - now stopped.  - treated infection (C.diff) for 1 week - will ready to start LD chemo around 6/24    5.  FEN/Renal: stable.     6. Abdominal Pain  secondary to c. Diff colitis and cancer: better now.   gave script for oxycodone 15 tabs and lorazepam 15 tabs for difficulty sleeping. Instructed not to take both together due to sedation    Final Plan:  Complete course of oral Vancomycin - C.diff is improving.   LD chemotherapy prior to  on 6/24  OK to proceed  He needs a new calendar    Rosa Terry MD

## 2021-06-22 NOTE — PROGRESS NOTES
Patient is here for labs, provider visit and treatment for   Follicular lymphoma grade i, lymph nodes of multiple sites

## 2021-06-22 NOTE — PROGRESS NOTES
[facility] Progress Note    Patient: Juvenal Blake  MRN: [id number]  Date of Service: January 2, 2019      Assessment and Plan:    1.  Follicular lymphoma: Continues on maintenance Rituxan.  Imaging was reviewed.  Some pelvic nodes are getting larger, but still relatively small in size overall.  Were going to send him for a biopsy.  If he continues to have low-grade follicular lymphoma then we will get a PET scan.  If he has a localized recurrence and will radiate.  It is more diffuse and he may need more chemotherapy.  We will see him back in clinic shortly after his biopsy is performed.     2.  Prostate cancer: He finished radiation in early May.  His side effects have resolved.     ECOG Performance   ECOG Performance Status: 0    Distress Assessment  Distress Assessment Score: No distress    Pain  Currently in Pain: Yes  Pain Score (Initial OR Reassessment): 2  Location: shoulders    Diagnosis:    1.  Follicular lymphoma: Diagnosed in April, 2010 . Stage IV diagnosis with bone marrow involvement.  Initial disease involved the cervical, supraclavicular, axillary, mesenteric mass, retroperitoneal nodes, and possibly the manubrium.  Relapse noted on imaging in December 2016.    2.  Prostate cancer:  Pathologic stage T3b prostate cancer.  Positive margins, multiple, and surgery.  3 lymph nodes were negative.  High risk for recurrent disease.  He is being followed at [facility].    Treatment:    Lymphoma:  He had 6 cycles of bendamustine and Rituxan completed in October, 2010.  He had 2 years of maintenance Rituxan therapy finishing in September, 2012.  Second course of bendamustine plus rituximab started February 20, 2017.  Cycle 6 was completed on July 11, 2017.    He is now on maintenance rituximab.    Prostate: Initial radical prostatectomy.  November 15, 2017.  Prostatic adenocarcinoma [place] 4+3 = 7 with tertiary pattern 5.  Tumor involves 45% of total surface area.  Positive for  "extensive extraprostatic extension.  Positive for bilateral seminal vesicle invasion.  Multiple margins positive.  Lymphovascular invasion not identified.  Lymph nodes negative.  3 were examined.  Completed radiation at [facility] early May 2018    Interim History:    Juvenal returns today for follow-up visit.  Overall he is been doing okay.  No acute complaints since his last visit.  No problems with his bowel habits.  Energy and appetite are okay.  No fevers, chills, night sweats.    Review of Systems:    Constitutional  Constitutional (WDL): Exceptions to WDL  Weight Gain: 5 - <10% from baseline(up 9 lbs)  Neurosensory  Neurosensory (WDL): All neurosensory elements are within defined limits  Cardiovascular  Cardiovascular (WDL): Exceptions to WDL  Edema: Yes  Edema Limbs: 5 - 10% inter-limb discrepancy in volume or circumference at point of greatest visible difference, swelling or obscuration of anatomic architecture on close inspection(ankles)  Pulmonary  Respiratory (WDL): Exceptions to WDL  Cough: Moderate symptoms, medical intervention indicated, limiting instrumental ADL(yellow phlegm yellow)  Dyspnea: Shortness of breath with moderate exertion  Gastrointestinal  Gastrointestinal (WDL): All gastrointestinal elements are within defined limits  Genitourinary  Genitourinary (WDL): All genitourinary elements are within defined limits  Integumentary  Integumentary (WDL): All integumentary elements are within defined limits  Patient Coping  Patient Coping: Accepting  Accompanied by  Accompanied by: Family Member    Past History:    Past Medical History:   Diagnosis Date     Arthritis     shoulder per H & P     GERD (gastroesophageal reflux disease)      H/O pyloric stenosis     as an infant per H & P      Inguinal hernia     per H & P      Lazy eye     per H & P      Non Hodgkin's lymphoma (H)      Prostate CA (H)      Sleep apnea     CPAP        Physical Exam:    Recent Vitals 1/2/2019   Height 5' 9\" "   Weight 244 lbs 13 oz   BSA (m2) 2.32 m2   BP -   Pulse -   Temp -   Temp src -   SpO2 -   Some recent data might be hidden     General: patient appears stated age of 53 y.o.. Nontoxic and in no distress.   HEENT: Head: atraumatic, normocephalic. Sclerae anicteric.  Chest:  Normal respiratory effort.    Cardiac: No edema.  Abdomen: abdomen is non-distended  Extremities: normal tone and muscle bulk.   Skin: no lesions or rash.   CNS: alert and oriented. Grossly non-focal.   Psychiatric: normal mood and affect.     Lab Results:    Recent Results (from the past 168 hour(s))   POCT creatinine   Result Value Ref Range    POC Creatinine 1.1 mg/dL   POCT GFR   Result Value Ref Range    POC GFR AMER AF HE >60  >60 mL/min/1.73m2    POC GFR NON AMER AF >60  >60 mL/min/1.73m2   Comprehensive Metabolic Panel   Result Value Ref Range    Sodium 139 136 - 145 mmol/L    Potassium 4.2 3.5 - 5.0 mmol/L    Chloride 105 98 - 107 mmol/L    CO2 27 22 - 31 mmol/L    Anion Gap, Calculation 7 5 - 18 mmol/L    Glucose 135 (H) 70 - 125 mg/dL    BUN 13 8 - 22 mg/dL    Creatinine 0.95 0.70 - 1.30 mg/dL    GFR MDRD Af Amer >60 >60 mL/min/1.73m2    GFR MDRD Non Af Amer >60 >60 mL/min/1.73m2    Bilirubin, Total 0.3 0.0 - 1.0 mg/dL    Calcium 9.4 8.5 - 10.5 mg/dL    Protein, Total 6.1 6.0 - 8.0 g/dL    Albumin 3.9 3.5 - 5.0 g/dL    Alkaline Phosphatase 66 45 - 120 U/L    AST 22 0 - 40 U/L    ALT 38 0 - 45 U/L   HM1 (CBC with Diff)   Result Value Ref Range    WBC 5.0 4.0 - 11.0 thou/uL    RBC 4.38 (L) 4.40 - 6.20 mill/uL    Hemoglobin 12.7 (L) 14.0 - 18.0 g/dL    Hematocrit 38.6 (L) 40.0 - 54.0 %    MCV 88 80 - 100 fL    MCH 29.0 27.0 - 34.0 pg    MCHC 32.9 32.0 - 36.0 g/dL    RDW 15.2 (H) 11.0 - 14.5 %    Platelets 171 140 - 440 thou/uL    MPV 9.2 8.5 - 12.5 fL    Neutrophils % 73 (H) 50 - 70 %    Lymphocytes % 13 (L) 20 - 40 %    Monocytes % 10 2 - 10 %    Eosinophils % 4 0 - 6 %    Basophils % 0 0 - 2 %    Neutrophils Absolute 3.6 2.0 - 7.7  thou/uL    Lymphocytes Absolute 0.6 (L) 0.8 - 4.4 thou/uL    Monocytes Absolute 0.5 0.0 - 0.9 thou/uL    Eosinophils Absolute 0.2 0.0 - 0.4 thou/uL    Basophils Absolute 0.0 0.0 - 0.2 thou/uL     Imaging:    CT scan images were personally reviewed.  A few right inguinal nodes are larger compared to April.    Ct Chest Abdomen Pelvis With Oral With Iv Cont    Result Date: 12/31/2018  CT CHEST, ABDOMEN, AND PELVIS 12/31/2018 9:10 AM      INDICATION: Lymphoma TECHNIQUE: CT chest, abdomen, and pelvis. Dose reduction techniques were used. IV CONTRAST: Iohexol (Omni) 100 mL COMPARISON: CT chest, abdomen, and pelvis 9/11/2018 and 1/22/2018. PET/CT 10/19/2017. FINDINGS: CHEST: No enlarged lymph nodes in the chest. Mild paraseptal and centrilobular emphysema with upper lobe predominance. Normal heart size. Right internal jugular venous Port-A-Cath terminates in the superior vena cava.  ABDOMEN: Stable inflammatory stranding of the central mesentery and retroperitoneum. No change in several small retroperitoneal lymph nodes measuring up to 10 mm near the left renal vein. Liver, gallbladder, bile ducts, spleen, pancreas, and adrenal glands are negative. Small cysts of the kidneys. Small fat-containing umbilical hernia. PELVIS: Right inguinal lymph nodes have increased in size compared to 9/11/2018 but are not fully imaged. For example, a 2.2 x 1.9 cm node was previously 1.8 x 1.5 cm (image 135). A 2.1 x 1.5 cm node was 1.6 x 1.0 cm on 9/11/2018 (image 129). A 2.7 x 2.4  cm lymph node was not included in the field-of-view on the previous study. Nearby right external iliac chain lymph nodes have also enlarged. For example, a 2.0 x 1.1 cm node was previously 1.5 x 0.8 cm (image 114). Urinary bladder has a thickened wall although is underdistended. Post prostatectomy. MUSCULOSKELETAL: Chronic fracture and pseudoarthrosis formation of the left lateral sixth rib.     CONCLUSION: 1.  Enlargement of right inguinal and external iliac  chain lymph nodes concerning for progression of disease. 2.  Stable inflammatory stranding and small lymph nodes of the central mesentery and abdominal retroperitoneum.      Signed by: Rob Crooks MD

## 2021-06-23 ENCOUNTER — INFUSION THERAPY VISIT (OUTPATIENT)
Dept: TRANSPLANT | Facility: CLINIC | Age: 56
End: 2021-06-23
Payer: COMMERCIAL

## 2021-06-23 ENCOUNTER — APPOINTMENT (OUTPATIENT)
Dept: LAB | Facility: CLINIC | Age: 56
End: 2021-06-23
Payer: COMMERCIAL

## 2021-06-23 ENCOUNTER — RESEARCH ENCOUNTER (OUTPATIENT)
Dept: TRANSPLANT | Facility: CLINIC | Age: 56
End: 2021-06-23

## 2021-06-23 ENCOUNTER — ONCOLOGY VISIT (OUTPATIENT)
Dept: TRANSPLANT | Facility: CLINIC | Age: 56
End: 2021-06-23
Attending: PHYSICIAN ASSISTANT
Payer: COMMERCIAL

## 2021-06-23 ENCOUNTER — TELEPHONE (OUTPATIENT)
Dept: CARDIOLOGY | Facility: CLINIC | Age: 56
End: 2021-06-23

## 2021-06-23 VITALS
WEIGHT: 255.2 LBS | RESPIRATION RATE: 18 BRPM | TEMPERATURE: 97.7 F | HEART RATE: 81 BPM | DIASTOLIC BLOOD PRESSURE: 78 MMHG | SYSTOLIC BLOOD PRESSURE: 136 MMHG | OXYGEN SATURATION: 94 % | BODY MASS INDEX: 38.24 KG/M2

## 2021-06-23 DIAGNOSIS — C82.00 FOLLICULAR LYMPHOMA GRADE I, UNSPECIFIED BODY REGION (H): Primary | ICD-10-CM

## 2021-06-23 DIAGNOSIS — C82.08 FOLLICULAR LYMPHOMA GRADE I, LYMPH NODES OF MULTIPLE SITES (H): Primary | ICD-10-CM

## 2021-06-23 DIAGNOSIS — Z85.72 HISTORY OF LYMPHOMA: ICD-10-CM

## 2021-06-23 DIAGNOSIS — R94.31 NONSPECIFIC ABNORMAL ELECTROCARDIOGRAM (ECG) (EKG): ICD-10-CM

## 2021-06-23 DIAGNOSIS — C82.08 FOLLICULAR LYMPHOMA GRADE I, LYMPH NODES OF MULTIPLE SITES (H): ICD-10-CM

## 2021-06-23 DIAGNOSIS — C82.90 FOLLICULAR LYMPHOMA (H): ICD-10-CM

## 2021-06-23 LAB
ABO + RH BLD: NORMAL
ABO + RH BLD: NORMAL
ALBUMIN SERPL-MCNC: 2.3 G/DL (ref 3.4–5)
ALP SERPL-CCNC: 230 U/L (ref 40–150)
ALT SERPL W P-5'-P-CCNC: 90 U/L (ref 0–70)
ANION GAP SERPL CALCULATED.3IONS-SCNC: 7 MMOL/L (ref 3–14)
ANISOCYTOSIS BLD QL SMEAR: SLIGHT
AST SERPL W P-5'-P-CCNC: 75 U/L (ref 0–45)
BASOPHILS # BLD AUTO: 0 10E9/L (ref 0–0.2)
BASOPHILS NFR BLD AUTO: 0 %
BILIRUB DIRECT SERPL-MCNC: 0.4 MG/DL (ref 0–0.2)
BILIRUB SERPL-MCNC: 0.9 MG/DL (ref 0.2–1.3)
BLD GP AB SCN SERPL QL: NORMAL
BLD PROD TYP BPU: NORMAL
BLOOD BANK CMNT PATIENT-IMP: NORMAL
BUN SERPL-MCNC: 18 MG/DL (ref 7–30)
CALCIUM SERPL-MCNC: 9.6 MG/DL (ref 8.5–10.1)
CHLORIDE SERPL-SCNC: 100 MMOL/L (ref 94–109)
CO2 SERPL-SCNC: 26 MMOL/L (ref 20–32)
CREAT SERPL-MCNC: 1.4 MG/DL (ref 0.66–1.25)
DIFFERENTIAL METHOD BLD: ABNORMAL
EOSINOPHIL # BLD AUTO: 0.6 10E9/L (ref 0–0.7)
EOSINOPHIL NFR BLD AUTO: 4.2 %
ERYTHROCYTE [DISTWIDTH] IN BLOOD BY AUTOMATED COUNT: 16.3 % (ref 10–15)
GFR SERPL CREATININE-BSD FRML MDRD: 55 ML/MIN/{1.73_M2}
GLUCOSE SERPL-MCNC: 124 MG/DL (ref 70–99)
HCT VFR BLD AUTO: 23.2 % (ref 40–53)
HGB BLD-MCNC: 7.5 G/DL (ref 13.3–17.7)
LDH SERPL L TO P-CCNC: 271 U/L (ref 85–227)
LYMPHOCYTES # BLD AUTO: 5.2 10E9/L (ref 0.8–5.3)
LYMPHOCYTES NFR BLD AUTO: 37.8 %
MAGNESIUM SERPL-MCNC: 2.1 MG/DL (ref 1.6–2.3)
MCH RBC QN AUTO: 28.7 PG (ref 26.5–33)
MCHC RBC AUTO-ENTMCNC: 32.3 G/DL (ref 31.5–36.5)
MCV RBC AUTO: 89 FL (ref 78–100)
METAMYELOCYTES # BLD: 0.1 10E9/L
METAMYELOCYTES NFR BLD MANUAL: 0.8 %
MONOCYTES # BLD AUTO: 0.7 10E9/L (ref 0–1.3)
MONOCYTES NFR BLD AUTO: 5.1 %
NEUTROPHILS # BLD AUTO: 7.1 10E9/L (ref 1.6–8.3)
NEUTROPHILS NFR BLD AUTO: 52.1 %
NUM BPU REQUESTED: 2
PHOSPHATE SERPL-MCNC: 4.3 MG/DL (ref 2.5–4.5)
PLATELET # BLD AUTO: 58 10E9/L (ref 150–450)
PLATELET # BLD EST: ABNORMAL 10*3/UL
POIKILOCYTOSIS BLD QL SMEAR: SLIGHT
POLYCHROMASIA BLD QL SMEAR: SLIGHT
POTASSIUM SERPL-SCNC: 4.3 MMOL/L (ref 3.4–5.3)
PROT SERPL-MCNC: 5.7 G/DL (ref 6.8–8.8)
RBC # BLD AUTO: 2.61 10E12/L (ref 4.4–5.9)
SODIUM SERPL-SCNC: 133 MMOL/L (ref 133–144)
SPECIMEN EXP DATE BLD: NORMAL
WBC # BLD AUTO: 13.7 10E9/L (ref 4–11)

## 2021-06-23 PROCEDURE — 96361 HYDRATE IV INFUSION ADD-ON: CPT

## 2021-06-23 PROCEDURE — 250N000011 HC RX IP 250 OP 636

## 2021-06-23 PROCEDURE — 96413 CHEMO IV INFUSION 1 HR: CPT

## 2021-06-23 PROCEDURE — 83615 LACTATE (LD) (LDH) ENZYME: CPT

## 2021-06-23 PROCEDURE — G0463 HOSPITAL OUTPT CLINIC VISIT: HCPCS | Mod: 25

## 2021-06-23 PROCEDURE — 86901 BLOOD TYPING SEROLOGIC RH(D): CPT | Performed by: PHYSICIAN ASSISTANT

## 2021-06-23 PROCEDURE — 86850 RBC ANTIBODY SCREEN: CPT | Performed by: PHYSICIAN ASSISTANT

## 2021-06-23 PROCEDURE — 85025 COMPLETE CBC W/AUTO DIFF WBC: CPT

## 2021-06-23 PROCEDURE — 80053 COMPREHEN METABOLIC PANEL: CPT

## 2021-06-23 PROCEDURE — 96415 CHEMO IV INFUSION ADDL HR: CPT

## 2021-06-23 PROCEDURE — 86900 BLOOD TYPING SEROLOGIC ABO: CPT | Performed by: PHYSICIAN ASSISTANT

## 2021-06-23 PROCEDURE — 99214 OFFICE O/P EST MOD 30 MIN: CPT

## 2021-06-23 PROCEDURE — 250N000011 HC RX IP 250 OP 636: Performed by: PHYSICIAN ASSISTANT

## 2021-06-23 PROCEDURE — 96417 CHEMO IV INFUS EACH ADDL SEQ: CPT

## 2021-06-23 PROCEDURE — 96375 TX/PRO/DX INJ NEW DRUG ADDON: CPT

## 2021-06-23 PROCEDURE — 84100 ASSAY OF PHOSPHORUS: CPT

## 2021-06-23 PROCEDURE — 86923 COMPATIBILITY TEST ELECTRIC: CPT | Performed by: PHYSICIAN ASSISTANT

## 2021-06-23 PROCEDURE — 82248 BILIRUBIN DIRECT: CPT

## 2021-06-23 PROCEDURE — 258N000003 HC RX IP 258 OP 636

## 2021-06-23 PROCEDURE — 83735 ASSAY OF MAGNESIUM: CPT

## 2021-06-23 RX ORDER — HEPARIN SODIUM (PORCINE) LOCK FLUSH IV SOLN 100 UNIT/ML 100 UNIT/ML
5 SOLUTION INTRAVENOUS
Status: CANCELLED | OUTPATIENT
Start: 2021-06-23

## 2021-06-23 RX ORDER — HEPARIN SODIUM (PORCINE) LOCK FLUSH IV SOLN 100 UNIT/ML 100 UNIT/ML
5 SOLUTION INTRAVENOUS ONCE
Status: COMPLETED | OUTPATIENT
Start: 2021-06-23 | End: 2021-06-23

## 2021-06-23 RX ORDER — HEPARIN SODIUM,PORCINE 10 UNIT/ML
5 VIAL (ML) INTRAVENOUS
Status: CANCELLED | OUTPATIENT
Start: 2021-06-23

## 2021-06-23 RX ORDER — GRANISETRON HYDROCHLORIDE 1 MG/ML
1 INJECTION INTRAVENOUS ONCE
Status: CANCELLED
Start: 2021-06-24

## 2021-06-23 RX ORDER — HEPARIN SODIUM (PORCINE) LOCK FLUSH IV SOLN 100 UNIT/ML 100 UNIT/ML
5 SOLUTION INTRAVENOUS
Status: DISCONTINUED | OUTPATIENT
Start: 2021-06-23 | End: 2021-06-23 | Stop reason: HOSPADM

## 2021-06-23 RX ORDER — GRANISETRON HYDROCHLORIDE 1 MG/ML
1 INJECTION INTRAVENOUS ONCE
Status: COMPLETED | OUTPATIENT
Start: 2021-06-23 | End: 2021-06-23

## 2021-06-23 RX ORDER — GRANISETRON HYDROCHLORIDE 1 MG/ML
1 INJECTION INTRAVENOUS ONCE
Status: CANCELLED
Start: 2021-06-23

## 2021-06-23 RX ORDER — GRANISETRON HYDROCHLORIDE 1 MG/ML
1 INJECTION INTRAVENOUS ONCE
Status: CANCELLED
Start: 2021-06-25

## 2021-06-23 RX ADMIN — SODIUM CHLORIDE 500 ML: 9 INJECTION, SOLUTION INTRAVENOUS at 09:07

## 2021-06-23 RX ADMIN — SODIUM CHLORIDE 500 ML: 9 INJECTION, SOLUTION INTRAVENOUS at 12:20

## 2021-06-23 RX ADMIN — SODIUM CHLORIDE, PRESERVATIVE FREE 5 ML: 5 INJECTION INTRAVENOUS at 13:45

## 2021-06-23 RX ADMIN — GRANISETRON HYDROCHLORIDE 1 MG: 1 INJECTION, SOLUTION INTRAVENOUS at 09:12

## 2021-06-23 RX ADMIN — CYCLOPHOSPHAMIDE 1000 MG: 1 INJECTION, POWDER, FOR SOLUTION INTRAVENOUS; ORAL at 09:50

## 2021-06-23 RX ADMIN — FLUDARABINE PHOSPHATE 70 MG: 25 INJECTION, SOLUTION INTRAVENOUS at 12:13

## 2021-06-23 RX ADMIN — Medication 5 ML: at 08:00

## 2021-06-23 ASSESSMENT — PAIN SCALES - GENERAL: PAINLEVEL: MILD PAIN (3)

## 2021-06-23 NOTE — TELEPHONE ENCOUNTER
Juvenal calls in and leaves message on nurse triage line.  He wants to talk with Madhavi Kurtz CNP or Dr Crooks about the plan after they discussed his case with the radiation team.  Per Madhavi, he was supposed to be seen in clinic yesterday and he did not show up.  This appt was to discuss the plan after having had a PET scan.  Juvenal reports that he did not know about this appt.  I transferred him to the  to get another appt arranged as Madhavi wants him to be seen in clinic to go over results and a plan.    Estrella Vargas RN

## 2021-06-23 NOTE — PROGRESS NOTES
[id number]: Study Visit Note    Subject name: Juvenal Blake     Visit:Day -5    Did the study visit occur within the appropriate window allowed by the protocol? yes  Juvenal and his wife are her today for the first day of LD chemo.   Since the last study visit, He has been doing been having increased insomnia getting up multiple times in the night, the sleep medication is somewhat helpful but not resolving this issue. He states he has had intermittent episodes of nausea and vomiting maybe once every other day, he takes compazine for this and states this is not new. He has issues with frequent urination and urgency which is not new but perhaps increased in the past weeks during the night. He states in this past week his feet are more swollen than normal and it is getting more difficult to wear his shoes, he has gotten compression socks and will use these. We also discussed keeping his feet elevated when he can. He states he has been having muscle cramping, generalized which he has had for at least a year and takes liquids with electrolytes for this. He states his abdominal pain can interfere with some activity when it is bad, if he has to bend over and thus limits what he can do at times. We reviewed his history and medications today for our research logs. Today his lab results show increase in LFT's we discussed this, he assures me he is refraining from alcohol since his discussion over a week ago with Dr Terry, he does use medical cannabis and states this is the most helpful for him for pain and anxiety.     I have personally interviewed Juvenal LUCIEN Gabrielchristiano and reviewed his medical record for adverse events and concomitant medications and these have been recorded on the corresponding logs in Juvenal Blake's research file.     Juvenal Rioschristiano was given the opportunity to ask any trial related questions.  Please see provider progress note for physical exam and other clinical information. Labs were reviewed -  any significant lab values were addressed and reviewed.    Sarah Beth Kessler RN

## 2021-06-23 NOTE — PROGRESS NOTES
[facility] Radiation Oncology Consult Note    Patient: Juvenal Blake  MRN: [id number]  Date of Service: 01/25/2019    Assessment / Impression     1. Follicular lymphoma grade i, lymph nodes of multiple sites (H)     2. Prostate cancer (H)       Cancer Staging  No matching staging information was found for the patient.  ECOG Peformance Status  ECOG Performance Status: 1  Distress Assessment Score: 3    Plan:   1) Radiation records and plan requested from [facility]. He received 4500 (bowel tolerance) to pelvic nodes 7 months ago so concern toxicity of retreatment to bowel, however want to verify field.  No matter what, if his groin remains symptomatic despite chemotherapy we could to hypofractionation to just that area.   2) Had PET today results pending.     Face to face time  60 minutes with > 75% spent on consultation, education and coordination of care.  Intent of Therapy: Palliative  Side effects that may occur during or within weeks after Radiation Therapy      Fatigue and general weakness    Nausea, vomiting and decrease in appetite    Pain on urination, frequent urge to urinate, blood in the urine, difficulty starting and decrease in the urine stream, retention of urine, and leakage of urine    Diarrhea, frequent, urgent and painful bowel movements, and blood in the stool    Darkening, irritation, itchiness, redness, dryness,and peeling of the skin of the pelvis     Loss of hair on the pelvis and groin    Side effects that may occur months or years after Radiation Therapy      Development of another tumor or cancer    Thickening, telangiectasias (development of spider like blood vessels in the skin) and ulceration of the skin of the pelvis    Decrease in function of the kidneys and liver    Ulcer and bleeding from the intestine or rectum    Obstruction of the bowel    Fistula of the rectum    Swelling of the feet and legs    Poor healing after a trauma or surgery in the irradiated area    Nerve damage resulting  in loss of strength and sensation    Infertiility (sterility)    Painful ejaculation or impotence    The risks, benefits and alternatives to radiation therapy were outlined with the patient. All questions were answered and a consent was signed.      Subjective:      HPI: Juvenal Blake is a 53 y.o. male with h/o prostate cancer and follicular lymphoma who presents for consideration of radiation to pelvic lymph nodes.     Prostate cancer diagnosed in 11/2017 and treated with radical prostatectomy Stage III [id number]  He had high risk disease with PSA 27, Maricel 4+3 and tertiary 5, he had extensive extraprostatic extension, bilateral seminal vesicle invasion, and multiple positive margins.  0/3 LN involved. Received radiation at [facility], the and the prostatic bed and pelvic LN treated with 4500 and prostate bed got 2340 boost for at total of 6480 cGy completed 5/2018. No hormonal therapy  Most recent PSA 0.1 1/14/2019.  .   He also has follicular lymphoma which was diagnosed as stage IV  in 4/2010.  He initially got  bendamustine and Rituxan for 6 cycfes done in 10/2010.  He then got 2 years of maintenance Rituxan .  He got a second course of 6 cycles bendamustine and Rituxan which was completed 7/11/2017.  He remains on maintenance Rituxan. Biopsy of inguinal LN 1/7/2019 positive for follicular lymphhoma He had a PET scan today which shows bilateral pelvic nodes and right inguinal LN. No disease elsewhere.     Has had persistent sinusitis currently on antibiotics. NO fevers, chills, facial pain.           Chief Complaint   Patient presents with     HE Cancer     Radiation   .  Prior Radiation: Yes: Pelvis and prostate bed   Concurrent Chemotherapy:  No    Current Outpatient Medications   Medication Sig Dispense Refill     AFLURIA QUAD [id number], PF, 60 mcg/0.5 mL Syrg every 2 (two) months.  0     albuterol (PROAIR HFA;PROVENTIL HFA;VENTOLIN HFA) 90 mcg/actuation inhaler Inhale 2 puffs every 6 (six) hours as needed for  wheezing.       beclomethasone (QVAR) 80 mcg/actuation inhaler Inhale 1 puff 2 (two) times a day.       levoFLOXacin (LEVAQUIN) 500 MG tablet Take 1 tablet (500 mg total) by mouth daily for 14 days. 14 tablet 0     lidocaine-prilocaine (EMLA) cream Apply to port 1/2 to 1 hour prior to accessing. 30 g 2     naproxen (NAPROSYN) 500 MG tablet Take 500 mg by mouth as needed.  2     omeprazole (PRILOSEC) 20 MG capsule Take 20 mg by mouth daily.       oxybutynin (DITROPAN XL) 10 MG ER tablet Take 10 mg by mouth daily.  3     psyllium (METAMUCIL) 3.4 gram packet Take 1 packet by mouth daily.       ROBAFEN AC  mg/5 mL liquid as needed.  0     sildenafil, antihypertensive, (REVATIO) 20 mg tablet Take 20 mg by mouth as needed.  11     No current facility-administered medications for this visit.      Facility-Administered Medications Ordered in Other Visits   Medication Dose Route Frequency Provider Last Rate Last Dose     HYDROcodone-acetaminophen  mg per tablet 1-2 tablet (NORCO )  1-2 tablet Oral Q6H PRN Itzel Edwards, CNS         naloxone injection 0.2-0.4 mg (NARCAN)  0.2-0.4 mg Intravenous PRN Itzel Edwards, RAFIQ        Or     naloxone injection 0.2-0.4 mg (NARCAN)  0.2-0.4 mg Intramuscular PRN Itzel Edwards, CNS         Past Medical History:   Diagnosis Date     Arthritis     shoulder per H & P     GERD (gastroesophageal reflux disease)      H/O pyloric stenosis     as an infant per H & P      Inguinal hernia     per H & P      Lazy eye     per H & P      Non Hodgkin's lymphoma (H)      Prostate CA (H)      Sleep apnea     CPAP     Past Surgical History:   Procedure Laterality Date     ABDOMINAL SURGERY      for pyloric stenosis as an infant     COLONOSCOPY       DISTAL CLAVICLE EXCISION      per H & P      HERNIA REPAIR      inguinal     port a cath placement  01/2017     MD LAP,PROSTATECTOMY,RADICAL,W/NERVE SPARE,INCL ROBOTIC N/A 11/15/2017    Procedure: ROBOTIC ASSISTED RADICAL RETROPUBIC  PROSTATECTOMY BILATERAL PELVIC LYMPH NODE DISSECTION ;  Surgeon: Ezio Steele MD;  Location: [facility] OR;  Service: Urology     shoulder arthroscopy Left      TONSILLECTOMY       US LYMPH NODE BIOPSY  2019     Zofran (as hydrochloride) [ondansetron hcl]  Family History   Problem Relation Age of Onset     Colon cancer Mother         diagnosed in her late 40s     Lymphoma Father         non-Hodgkins lymphoma, diagnosed 70s     No Medical Problems Sister      No Medical Problems Brother      No Medical Problems Son      No Medical Problems Sister      No Medical Problems Son      Lung cancer Maternal Grandmother      Lung cancer Maternal Uncle      Social History     Socioeconomic History     Marital status:      Spouse name: Not on file     Number of children: Not on file     Years of education: Not on file     Highest education level: Not on file   Social Needs     Financial resource strain: Not on file     Food insecurity - worry: Not on file     Food insecurity - inability: Not on file     Transportation needs - medical: Not on file     Transportation needs - non-medical: Not on file   Occupational History     Not on file   Tobacco Use     Smoking status: Former Smoker     Packs/day: 1.00     Years: 20.00     Pack years: 20.00     Last attempt to quit:      Years since quittin.0     Smokeless tobacco: Never Used   Substance and Sexual Activity     Alcohol use: Yes     Alcohol/week: 6.0 oz     Types: 10 Cans of beer per week     Drug use: No     Comment: none in the past 2 years     Sexual activity: No   Other Topics Concern     Not on file   Social History Narrative     Not on file        Review of Systems:        General  Constitutional (WDL): Exceptions to WDL  Fatigue: Fatigue relieved by rest  EENT  Eye Disorder (WDL): Exceptions to WDL  Blurred Vision: Intervention not indicated  Ear Disorder (WDL): All ear disorder elements are within defined limits  Respiratory        Respiratory (WDL): Exceptions to WDL  Cough: Moderate symptoms, medical intervention indicated, limiting instrumental ADL  Dyspnea: Shortness of breath with minimal exertion, limiting instrumental ADL  Cardiovascular  Cardiovascular (WDL): Exceptions to WDL  Edema: Yes  Edema Limbs: 5 - 10% inter-limb discrepancy in volume or circumference at point of greatest visible difference, swelling or obscuration of anatomic architecture on close inspection  Endocrine     Gastrointestinal  Gastrointestinal (WDL): Exceptions to WDL  Musculoskeletal  Musculoskeletal and Connetive Tissue Disorders (WDL): Exceptions to WDL  Arthralgia: Mild pain  Integumentary               Integumentary (WDL): All integumentary elements are within defined limits  Neurological  Neurosensory (WDL): All neurosensory elements are within defined limits  Psychological/Emotional   Patient Coping: Accepting  Hematological/Lymphatic  Lymph (WDL): All lymph disorder elements are within defined limits  Dermatologic     Genitourinary/Reproductive  Genitourinary (WDL): Exceptions to WDL  Urinary Frequency: Present  Reproductive     Pain              Currently in Pain: Yes  Pain Score (Initial OR Reassessment): 3  Pain Frequency: Constant/continuous  Location: shoulders  Pain Intervention(s): Medication (See MAR)  Response to Interventions: helpful   AUA Assessment                    Accompanied by  Accompanied by: Alone      Objective:     Physical Exam    Vitals:    01/25/19 1458   BP: 139/77   Pulse: 64   Temp: 98.2  F (36.8  C)   TempSrc: Oral   SpO2: 97%   Weight: (!) 248 lb (112.5 kg)       GENERAL: no acute distress. Cooperative in conversation.   HEENT: pupils are equal, round and reactive. Oromucosa moist.  RESP: Normal respiratory effort. Regular respiratory rate. No wheezes or rhonchi.  CV: Regular, rate and rhythm.   ABD: soft, nontender..   MUSCULOSKELETAL: no palpable points of tenderness except in right groin   NEURO: non focal. Alert and  oriented x3.   PSYCH: within normal limits. No depression or anxiety.  SKIN: warm dry intact   LYMPH: no cervical, supraclavicular lymphadenopathy, but palpable right inguinal LN   EXTREMITIES: no edema          Recent Labs:   Recent Results (from the past 168 hour(s))   POCT Glucose   Result Value Ref Range    Glucose 94 70 - 139 mg/dL       Imaging: Imaging results 30 days: Ct Chest Abdomen Pelvis With Oral With Iv Cont    Result Date: 12/31/2018  CT CHEST, ABDOMEN, AND PELVIS 12/31/2018 9:10 AM      INDICATION: Lymphoma TECHNIQUE: CT chest, abdomen, and pelvis. Dose reduction techniques were used. IV CONTRAST: Iohexol (Omni) 100 mL COMPARISON: CT chest, abdomen, and pelvis 9/11/2018 and 1/22/2018. PET/CT 10/19/2017. FINDINGS: CHEST: No enlarged lymph nodes in the chest. Mild paraseptal and centrilobular emphysema with upper lobe predominance. Normal heart size. Right internal jugular venous Port-A-Cath terminates in the superior vena cava.  ABDOMEN: Stable inflammatory stranding of the central mesentery and retroperitoneum. No change in several small retroperitoneal lymph nodes measuring up to 10 mm near the left renal vein. Liver, gallbladder, bile ducts, spleen, pancreas, and adrenal glands are negative. Small cysts of the kidneys. Small fat-containing umbilical hernia. PELVIS: Right inguinal lymph nodes have increased in size compared to 9/11/2018 but are not fully imaged. For example, a 2.2 x 1.9 cm node was previously 1.8 x 1.5 cm (image 135). A 2.1 x 1.5 cm node was 1.6 x 1.0 cm on 9/11/2018 (image 129). A 2.7 x 2.4  cm lymph node was not included in the field-of-view on the previous study. Nearby right external iliac chain lymph nodes have also enlarged. For example, a 2.0 x 1.1 cm node was previously 1.5 x 0.8 cm (image 114). Urinary bladder has a thickened wall although is underdistended. Post prostatectomy. MUSCULOSKELETAL: Chronic fracture and pseudoarthrosis formation of the left lateral sixth rib.      CONCLUSION: 1.  Enlargement of right inguinal and external iliac chain lymph nodes concerning for progression of disease. 2.  Stable inflammatory stranding and small lymph nodes of the central mesentery and abdominal retroperitoneum.    Nm Pet Ct Skull To Mid Thigh    Result Date: 1/25/2019  [facility] RADIOLOGY EXAM: NM PET CT SKULL TO MID THIGH LOCATION: [facility] DATE/TIME: 1/25/2019 3:00 PM INDICATION: Lymphoma, non-Hodgkin follicular, restaging. Evidence of recurrence on recent imaging. Subsequent treatment strategy. History of prostate cancer. COMPARISON: FDG PET/CT 08/10/2017. CT chest abdomen pelvis 12/31/2018 reviewed. TECHNIQUE: Serum glucose level 94 mg/dL. One hour post intravenous administration of 11.9 mCi F-18 FDG, PET imaging was performed from the skull base to the mid thighs utilizing attenuation correction with concurrent axial CT and PET/CT image fusion. Dose reduction techniques were used. FINDINGS: Since 08/10/2017, FDG avid adenopathy has developed involving multiple sites below the diaphragm. Findings similar to 12/31/2018. Jostin sites include bilateral iliac, right greater than left, and right inguinal regions. A representative right inguinal node measures 2.3 x 2.5 cm associated with marked uptake (SUVmax 13.2). Stranding in the upper abdomen with underlying micronodularity and low-level uptake suggesting sites of prior treated lymphoma. No FDG avid adenopathy above the diaphragm. Normal size liver and spleen with normal uptake. No suspicious focal skeletal uptake. Perineal surface contamination. Moderate mucosal thickening right maxillary sinus. Right IJ Port-A-Cath with tip near the SVC/RA junction. Mild diffuse hepatic steatosis. Small bilateral renal cysts. Mild atherosclerotic calcifications. Prostatectomy. Photopenia involving pelvic bones from prior radiation therapy. Mild scattered degenerative changes in the spine.     CONCLUSION: Findings consistent with recurrent  lymphoma involving bilateral iliac and right inguinal lymph nodes below the diaphragm.    Us Lymph Node Biopsy    Result Date: 1/7/2019  [facility] RADIOLOGY EXAM: 1. FINE-NEEDLE ASPIRATION RIGHT MEDIAL THIGH LYMPH NODE 2. CORE BIOPSY RIGHT MEDIAL THIGH LYMPH NODE 3. ULTRASOUND GUIDANCE LOCATION: [facility] DATE/STELLA: 1/7/2019 2:00 PM INDICATION: Lymphoma and prostate cancer with enlarging right inguinal/right thigh nodes. PROCEDURE: Informed consent obtained. Time out performed. The site was prepped and draped in sterile fashion. 5  mL of 1% lidocaine was infused into the local soft tissues. Under direct ultrasound guidance, multiple fine-needle aspirates of the nodule were obtained to evaluate with flow cytometry. The pathologist requested core needle samples for architectural assessment. 4 18-gauge core samples were obtained. The tissue was felt to be adequate by pathology. RADIOLOGIC SUPERVISION AND INTERPRETATION: ULTRASOUND GUIDANCE: Images demonstrate the needle within the nodule.     CONCLUSION: 1.  Status post ultrasound-guided fine-needle aspiration of a right medial thigh lymph node as requested by the pathologist 2.  status post ultrasound-guided core needle biopsy of a right medial thigh lymph node as requested by the pathologist. Reference CPT Codes: 27971, 22539      Pathology:   No results found for this or any previous visit (from the past 8760 hour(s)).      Signed by: Kinga Foster MD

## 2021-06-23 NOTE — PROGRESS NOTES
Pt ambulatory to radiation clinic for initial radiation consult. Has past history of radiation at [facility] to prostate. RN spent 15 minutes with pt discussing RT, RT planning, and RT side effects. ACS RT and Radha RT hand outs given with explanation. Further recommendations and orders per provider.

## 2021-06-23 NOTE — TELEPHONE ENCOUNTER
I had not heard back from Juvenal so I placed another call to him today.  He tells me that he just recently started a new job and is still in his probation period where they could terminate him if he needed to be off work for treatments.  He states that he should be able to come in April and start treatment if that is what is recommended at that time.  I let him know that I would talk with Dr Crooks and have our schedulers call to get him scheduled.  He verbalized understanding.    Estrella Vargas RN

## 2021-06-23 NOTE — LETTER
6/23/2021         RE: Juvenal Blake  [address] [place] [address]        Dear Colleague,    Thank you for referring your patient, Juvenal Blake, to the [facility] BLOOD AND MARROW TRANSPLANT PROGRAM [place]. Please see a copy of my visit note below.    BMT Clinic Note   Jun 23, 2021      Juvenal Blake is a 55 year old year old male diagnosed with NHL.  He has been treated with Bendamustine + Rituxan then Bendamustine +  Rituxan once again the R-CHOP followed by Nam-NK cell infusion. He is enrolled in protocol [id number], which utilizes Cy/Flu as a lymphodepletion regimen as bridge to [place].    HPI:  Juvenal feels okay.  His abdomen is painful when he moves around or rides in the car, but it is okay at rest.  It is much more distended that usual, he tells me.  He feels full all the time, so he isn't able to eat much.  He is pushing fluids.  His legs are swollen, which is new.  He has a stable, intermittent cough.      ROS:  10 point ROS negative except as above.   Pre-treatment ICANS: 10/10.  Patient has writing log with him.  Physical Exam:   General: Non-toxic. Conversant. KPS 70-80  Eyes: : JOE, sclera anicteric   Lungs: CTA bilaterally.    Cardiovascular: RRR, no M/R/G   Abdominal/Rectal: +protuberant, firm to palpation. + BS  Lymphatics: 2+ pitting edema bilateral shins  Skin: no rashes or petechaie; slightly jaundiced appearance to face  Neuro: A&O   Additional Findings: Jacobs site NT, no drainage.  Labs:  Lab Results   Component Value Date    WBC 13.7 (H) 06/23/2021    ANEU 7.1 06/23/2021    HGB 7.5 (L) 06/23/2021    HCT 23.2 (L) 06/23/2021    PLT 58 (L) 06/23/2021     06/23/2021    POTASSIUM 4.3 06/23/2021    CHLORIDE 100 06/23/2021    CO2 26 06/23/2021     (H) 06/23/2021    BUN 18 06/23/2021    CR 1.40 (H) 06/23/2021    MAG 2.1 06/23/2021    INR 0.97 06/04/2021    AST 75 (H) 06/23/2021    ALT 90 (H) 06/23/2021       Assessment and Plan:   1.  Lymphoma:  - He had 6  cycles of bendamustine and Rituxan completed in October, 2010. He had 2 years of maintenance Rituxan therapy finishing in September, 2012.  - Second course of bendamustine plus rituximab started February 20, 2017. Cycle 6 was completed on July 11, 2017. Then had maintenance rituximab through January, 2019.  - O-CHOP started at second relapse. Cycle 1 given April 23, 2019. Cycle 6 given August 16, 2019.  Haplo NAM-NK therapy at [facility] September 1, 2020  - Iidelalisib started October 21, 2020 at [facility]. 150 mg twice daily.  stopped Idelalisib - last dose was on 6/10 at 8am.   - Eligible to proceed with therapy with retreatment ; flu/cy 6/23; flu/cy/rituxan 6/24; flu/cy 6/25.    2.  HEME:  No need for transfusions today.     3.  :   Initial radical prostatectomy and bilateral lymph node dissection. November 15, 2017. Prostatic adenocarcinoma Maricel 4+3 = 7 with tertiary pattern 5. Tumor involves 45% of total surface area. Positive for extensive extraprostatic extension. Positive for bilateral seminal vesicle invasion. Multiple margins positive. Lymphovascular invasion not identified. Perineural invasion was noted. Lymph nodes negative. 3 were examined (2 right obturator and 1 left obturator).    Completed radiation to the prostate fossa and pelvic lymph nodes at [facility] early May 2018. He received 4500 cGy in 25 fractions. He then had 2340 cGy boost in 13 fractions to the prostatic fossa, for a total dose of 6240 cGy in 38 treatment fractions delivered over 54 days. He did not receive hormonal therapy:      4.  ID:    C diff colitis: finishes vancomycin today (treated for 7 days).   COVID negative. RVP + for Rhinovirus 6/11; asymptomatic.  - S/p Rocephin 2gm daily x 3 days - now stopped.    5.  FEN/Renal: bump in creatinine; will get IVF today with chemo     6. Abdominal Pain secondary to cancer.  Also now seeing a rise in LFTs, which I suspect to be related.   Has oxycodone 15 tabs and  lorazepam 15 tabs for difficulty sleeping. Aware not to take both together due to sedation    Final Plan:  RTC 6/24 for flu/cy/rituxan; possible red cells (though put off until 6/25 if possible due to long chemo day tomorrow).    I spent 30 minutes in the care of this patient today, which included time necessary for preparation for the visit, obtaining history, ordering medications/tests/procedures as medically indicated, review of pertinent medical literature, counseling of the patient, communication of recommendations to the care team, and documentation time.    Penny Garcia PA-C  6/23/2021          Again, thank you for allowing me to participate in the care of your patient.        Sincerely,        BMT Advanced Practice Provider

## 2021-06-23 NOTE — PROGRESS NOTES
Patient is here for provider visit for Follicular lymphoma grade i, lymph nodes of multiple sites.

## 2021-06-23 NOTE — PROGRESS NOTES
Infusion Nursing Note    Juvenal Blake presents today for scheduled cytoxan/fludarabine.      Patient seen by provider today: Yes: Penny Carrier NAYA   present during visit today: Not Applicable.    Note:   Pt received 500 mL 0.9NS flush and kytril 1mg IV prior to chemo.    Pt received cytoxan 1000 mg over two hours.    Pt received a post flush of 500 mL 0.9NS after cytoxan.    Pt received fludarabine 70 mg IV over an hour.    Intravenous Access:  Implanted Port.    Treatment Conditions:    Labs were monitored.    Lab Results   Component Value Date    HGB 7.5 06/23/2021     Lab Results   Component Value Date    WBC 13.7 06/23/2021      Lab Results   Component Value Date    ANEU 7.1 06/23/2021     Lab Results   Component Value Date    PLT 58 06/23/2021      Lab Results   Component Value Date     06/23/2021                   Lab Results   Component Value Date    POTASSIUM 4.3 06/23/2021           Lab Results   Component Value Date    MAG 2.1 06/23/2021            Lab Results   Component Value Date    CR 1.40 06/23/2021                   Lab Results   Component Value Date    TRE 9.6 06/23/2021                Lab Results   Component Value Date    BILITOTAL 0.9 06/23/2021           Lab Results   Component Value Date    ALBUMIN 2.3 06/23/2021                    Lab Results   Component Value Date    ALT 90 06/23/2021           Lab Results   Component Value Date    AST 75 06/23/2021           Post Infusion Assessment:  Patient tolerated infusion without incident.  Blood return noted pre and post infusion.  Site patent and intact, free from redness, edema or discomfort.  No evidence of extravasations.  Access discontinued per protocol.       Discharge Plan:   [facility]  Patient will return tomorrow for next appointment.   Patient discharged in stable condition accompanied by: self and wife.  Departure Mode: Ambulatory.      Cherie Jessica RN

## 2021-06-23 NOTE — TELEPHONE ENCOUNTER
Per Dr Crooks, patient can come in in April for lab, MD and infusion. I have sent a message to Dr Crooks'  to call patient and make the appt.  Dr Crooks said no need for another scan prior to this appts.    Estrella Vargas RN

## 2021-06-23 NOTE — PROGRESS NOTES
BMT Clinic Note   Jun 23, 2021      Juvenal Blake is a 55 year old year old male diagnosed with NHL.  He has been treated with Bendamustine + Rituxan then Bendamustine +  Rituxan once again the R-CHOP followed by Nam-NK cell infusion. He is enrolled in protocol VM3067-05, which utilizes Cy/Flu as a lymphodepletion regimen as bridge to Yescarta.    HPI:  Juvenal feels okay.  His abdomen is painful when he moves around or rides in the car, but it is okay at rest.  It is much more distended that usual, he tells me.  He feels full all the time, so he isn't able to eat much.  He is pushing fluids.  His legs are swollen, which is new.  He has a stable, intermittent cough.      ROS:  10 point ROS negative except as above.   Pre-treatment ICANS: 10/10.  Patient has writing log with him.  Physical Exam:   General: Non-toxic. Conversant. KPS 70-80  Eyes: : JOE, sclera anicteric   Lungs: CTA bilaterally.    Cardiovascular: RRR, no M/R/G   Abdominal/Rectal: +protuberant, firm to palpation. + BS  Lymphatics: 2+ pitting edema bilateral shins  Skin: no rashes or petechaie; slightly jaundiced appearance to face  Neuro: A&O   Additional Findings: Jacobs site NT, no drainage.  Labs:  Lab Results   Component Value Date    WBC 13.7 (H) 06/23/2021    ANEU 7.1 06/23/2021    HGB 7.5 (L) 06/23/2021    HCT 23.2 (L) 06/23/2021    PLT 58 (L) 06/23/2021     06/23/2021    POTASSIUM 4.3 06/23/2021    CHLORIDE 100 06/23/2021    CO2 26 06/23/2021     (H) 06/23/2021    BUN 18 06/23/2021    CR 1.40 (H) 06/23/2021    MAG 2.1 06/23/2021    INR 0.97 06/04/2021    AST 75 (H) 06/23/2021    ALT 90 (H) 06/23/2021       Assessment and Plan:   1.  Lymphoma:  - He had 6 cycles of bendamustine and Rituxan completed in October, 2010. He had 2 years of maintenance Rituxan therapy finishing in September, 2012.  - Second course of bendamustine plus rituximab started February 20, 2017. Cycle 6 was completed on July 11, 2017. Then had maintenance  rituximab through January, 2019.  - O-CHOP started at second relapse. Cycle 1 given April 23, 2019. Cycle 6 given August 16, 2019.  Haplo NAM-NK therapy at [facility] September 1, 2020  - Iidelalisib started October 21, 2020 at [facility] rrelapse. 150 mg twice daily.  stopped Idelalisib - last dose was on 6/10 at 8am.   - Eligible to proceed with therapy with retreatment ; flu/cy 6/23; flu/cy/rituxan 6/24; flu/cy 6/25.    2.  HEME:  No need for transfusions today.     3.  :   Initial radical prostatectomy and bilateral lymph node dissection. November 15, 2017. Prostatic adenocarcinoma Taft 4+3 = 7 with tertiary pattern 5. Tumor involves 45% of total surface area. Positive for extensive extraprostatic extension. Positive for bilateral seminal vesicle invasion. Multiple margins positive. Lymphovascular invasion not identified. Perineural invasion was noted. Lymph nodes negative. 3 were examined (2 right obturator and 1 left obturator).    Completed radiation to the prostate fossa and pelvic lymph nodes at [facility] early May 2018. He received 4500 cGy in 25 fractions. He then had 2340 cGy boost in 13 fractions to the prostatic fossa, for a total dose of 6240 cGy in 38 treatment fractions delivered over 54 days. He did not receive hormonal therapy:      4.  ID:    C diff colitis: finishes vancomycin today (treated for 7 days).   COVID negative. RVP + for Rhinovirus 6/11; asymptomatic.  - S/p Rocephin 2gm daily x 3 days - now stopped.    5.  FEN/Renal: bump in creatinine; will get IVF today with chemo     6. Abdominal Pain secondary to cancer.  Also now seeing a rise in LFTs, which I suspect to be related.   Has oxycodone 15 tabs and lorazepam 15 tabs for difficulty sleeping. Aware not to take both together due to sedation    Final Plan:  RTC 6/24 for flu/cy/rituxan; possible red cells (though put off until 6/25 if possible due to long chemo day tomorrow).    I spent 30 minutes in the care of this patient  today, which included time necessary for preparation for the visit, obtaining history, ordering medications/tests/procedures as medically indicated, review of pertinent medical literature, counseling of the patient, communication of recommendations to the care team, and documentation time.    Penny Garcia PA-C  6/23/2021

## 2021-06-23 NOTE — LETTER
6/23/2021         RE: Juvenal Blake  [address] [place] [address]        Dear Colleague,    Thank you for referring your patient, Juvenal Blake, to the [facility]LOOD AND MARROW TRANSPLANT PRO[facility] [place]. Please see a copy of my visit note below.    Infusion Nursing Note    Juvenal Blake presents today for scheduled cytoxan/fludarabine.      Patient seen by provider today: Yes: Penny Carrier NAYA   present during visit today: Not Applicable.    Note:   Pt received 500 mL 0.9NS flush and kytril 1mg IV prior to chemo.    Pt received cytoxan 1000 mg over two hours.    Pt received a post flush of 500 mL 0.9NS after cytoxan.    Pt received fludarabine 70 mg IV over an hour.    Intravenous Access:  Implanted Port.    Treatment Conditions:    Labs were monitored.    Lab Results   Component Value Date    HGB 7.5 06/23/2021     Lab Results   Component Value Date    WBC 13.7 06/23/2021      Lab Results   Component Value Date    ANEU 7.1 06/23/2021     Lab Results   Component Value Date    PLT 58 06/23/2021      Lab Results   Component Value Date     06/23/2021                   Lab Results   Component Value Date    POTASSIUM 4.3 06/23/2021           Lab Results   Component Value Date    MAG 2.1 06/23/2021            Lab Results   Component Value Date    CR 1.40 06/23/2021                   Lab Results   Component Value Date    TRE 9.6 06/23/2021                Lab Results   Component Value Date    BILITOTAL 0.9 06/23/2021           Lab Results   Component Value Date    ALBUMIN 2.3 06/23/2021                    Lab Results   Component Value Date    ALT 90 06/23/2021           Lab Results   Component Value Date    AST 75 06/23/2021           Post Infusion Assessment:  Patient tolerated infusion without incident.  Blood return noted pre and post infusion.  Site patent and intact, free from redness, edema or discomfort.  No evidence of extravasations.  Access discontinued per  protocol.       Discharge Plan:   AVS to patient via MYCHART.  Patient will return tomorrow for next appointment.   Patient discharged in stable condition accompanied by: self and wife.  Departure Mode: Ambulatory.      Cherie Jessica RN                          Again, thank you for allowing me to participate in the care of your patient.        Sincerely,        [facility]

## 2021-06-23 NOTE — NURSING NOTE
Chief Complaint   Patient presents with     Port Draw     Labs drawn via port by RN in lab. VS taken.      Port accessed with 20 gauge 3/4 inch gripper needle and labs drawn by rn.  Port flushed with normal saline and heparin.  Pt tolerated well.  VS taken.  Pt checked in for next appt.    Merle Mendez RN

## 2021-06-23 NOTE — PROGRESS NOTES
[facility] Hematology and Oncology Progress Note    Patient: Juvenal Blake  MRN: [id number]  Date of Service: 01/14/19          Reason for Visit    Chief Complaint   Patient presents with     HE Cancer     Follicular lymphoma        Assessment and Plan    1.  Follicular lymphoma, low-grade. Pt is currently on maintenance rituxan.  His last CT scan in December showed some possible progression in the right inguinal area.  Everything else was stable.  Is a biopsy of the right inguinal node that does show residual lymphoma that is low-grade and looks similar to his previous biopsies.  I am to go ahead and get a PET scan.  If it does show that this is the only site of disease I think he needs to get some radiation therapy.  Patient has a history of having radiation for his prostate cancer but at this time he says it would be easier for him to stay within the [facility] system so we will refer to our radiation oncologist per his request.    2. Prostate cancer: s/p surgery and radiation, which finished around May 2018.  Should not has an appointment with Dr. Steele later this week but would like his PSA drawn here today so that result is back for Dr. Steele    3. Sinus infection: will try levaquin 500mg daily for 14 days. If no success, refer back to PCP        ECOG Performance   ECOG Performance Status: 0    Distress Assessment  Distress Assessment Score: 3    Pain  Currently in Pain: Yes  Pain Score (Initial OR Reassessment): 2  Location: shoulders:       Problem List    1. Follicular lymphoma grade i, lymph nodes of multiple sites (H)  NM PET CT Skull to Mid Thigh    Ambulatory referral to Radiation Oncology   2. Prostate cancer (H)  PSA, Diagnostic (Prostatic-Specific Antigen)      ______________________________________________________________________________    History of Present Illness    Diagnosis:     1. Follicular lymphoma: Diagnosed in April, 2010 . Stage IV diagnosis with bone marrow involvement. Initial  disease involved the cervical, supraclavicular, axillary, mesenteric mass, retroperitoneal nodes, and possibly the manubrium.    2.  Prostate cancer:  Pathologic stage T3b prostate cancer.  Positive margins, multiple, and surgery.  3 lymph nodes were negative.  High risk for recurrent disease    Treatment:     Lymphoma:  He had 6 cycles of bendamustine and Rituxan completed in October, 2010.  He had 2 years of maintenance Rituxan therapy finishing in September, 2012.  Second course of bendamustine plus rituximab started February 20, 2017.  Cycle 6 was completed on July 11, 2017.    He is now on maintenance rituximab.     Prostate: Initial radical prostatectomy.  November 15, 2017.  Prostatic adenocarcinoma Maricel 4+3 = 7 with tertiary pattern 5.  Tumor involves 45% of total surface area.  Positive for extensive extraprostatic extension.  Positive for bilateral seminal vesicle invasion.  Multiple margins positive.  Lymphovascular invasion not identified.  Lymph nodes negative.  3 were examined.  He received adjuvant radiation and finished around May 2018.    Interim History:  Juvenal returns to the clinic.  He is here to follow-up on biopsy results.  He says he continues to have issues with a sinus infection.  Is on one antibiotic and it does not seem to be helping.  He says is pretty miserable.  He has a lot of pressure in his face and head.  He also is noticing some pain in the right groin where he had his biopsy since the biopsy.  Constitutional symptoms.    Pain Status  Currently in Pain: Yes    Review of Systems    Constitutional  Constitutional (WDL): Exceptions to WDL  Fatigue: Fatigue relieved by rest  Neurosensory  Neurosensory (WDL): All neurosensory elements are within defined limits  Eye   Eye Disorder (WDL): Exceptions to WDL  Blurred Vision: Intervention not indicated(occ)  Ear  Ear Disorder (WDL): All ear disorder elements are within defined limits  Cardiovascular  Cardiovascular (WDL): Exceptions to  WDL  Edema: Yes  Edema Limbs: 5 - 10% inter-limb discrepancy in volume or circumference at point of greatest visible difference, swelling or obscuration of anatomic architecture on close inspection(ankles)  Pulmonary  Respiratory (WDL): Exceptions to WDL  Cough: Moderate symptoms, medical intervention indicated, limiting instrumental ADL(discolored phlegm)  Dyspnea: Shortness of breath with moderate exertion  Gastrointestinal  Gastrointestinal (WDL): Exceptions to WDL  Diarrhea: Increase of <4 stools per day over baseline, mild increase in ostomy output compared to baseline(occ chronic)  Genitourinary  Genitourinary (WDL): All genitourinary elements are within defined limits  Musculoskeletal and Connective Tissue  Musculoskeletal and Connetive Tissue Disorders (WDL): Exceptions to WDL  Arthralgia: Mild pain(shoulders)  Integumentary  Integumentary (WDL): All integumentary elements are within defined limits  Patient Coping  Patient Coping: Accepting  Distress Assessment  Distress Assessment Score: 3  Accompanied by  Accompanied by: Family Member(wife)    Past History  Past Medical History:   Diagnosis Date     Arthritis     shoulder per H & P     GERD (gastroesophageal reflux disease)      H/O pyloric stenosis     as an infant per H & P      Inguinal hernia     per H & P      Lazy eye     per H & P      Non Hodgkin's lymphoma (H)      Prostate CA (H)      Sleep apnea     CPAP       PHYSICAL EXAM:  /59   Pulse 71   Temp 98.2  F (36.8  C) (Oral)   Wt (!) 244 lb 11.2 oz (111 kg)   SpO2 96%   BMI 36.14 kg/m    GENERAL: no acute distress. Cooperative in conversation. Here with wife  HEENT: pupils are equal, round and reactive. Oromucosa is clean and intact. No ulcerations or mucositis noted. No bleeding noted. Pain with palpation in frontal/maxillary sinus  RESP: lungs are clear bilaterally per auscultation. Regular respiratory rate. No wheezes or rhonchi.  CV: Regular, rate and rhythm. No murmurs.    MUSCULOSKELETAL: No lower extremity swelling.   NEURO: non focal. Alert and oriented x3.   PSYCH: within normal limits. No depression or anxiety.  SKIN: warm dry intact , port is CDI        Lab Results    No results found for this or any previous visit (from the past 168 hour(s)).    Imaging    Ct Chest Abdomen Pelvis With Oral With Iv Cont    Result Date: 12/31/2018  CT CHEST, ABDOMEN, AND PELVIS 12/31/2018 9:10 AM      INDICATION: Lymphoma TECHNIQUE: CT chest, abdomen, and pelvis. Dose reduction techniques were used. IV CONTRAST: Iohexol (Omni) 100 mL COMPARISON: CT chest, abdomen, and pelvis 9/11/2018 and 1/22/2018. PET/CT 10/19/2017. FINDINGS: CHEST: No enlarged lymph nodes in the chest. Mild paraseptal and centrilobular emphysema with upper lobe predominance. Normal heart size. Right internal jugular venous Port-A-Cath terminates in the superior vena cava.  ABDOMEN: Stable inflammatory stranding of the central mesentery and retroperitoneum. No change in several small retroperitoneal lymph nodes measuring up to 10 mm near the left renal vein. Liver, gallbladder, bile ducts, spleen, pancreas, and adrenal glands are negative. Small cysts of the kidneys. Small fat-containing umbilical hernia. PELVIS: Right inguinal lymph nodes have increased in size compared to 9/11/2018 but are not fully imaged. For example, a 2.2 x 1.9 cm node was previously 1.8 x 1.5 cm (image 135). A 2.1 x 1.5 cm node was 1.6 x 1.0 cm on 9/11/2018 (image 129). A 2.7 x 2.4  cm lymph node was not included in the field-of-view on the previous study. Nearby right external iliac chain lymph nodes have also enlarged. For example, a 2.0 x 1.1 cm node was previously 1.5 x 0.8 cm (image 114). Urinary bladder has a thickened wall although is underdistended. Post prostatectomy. MUSCULOSKELETAL: Chronic fracture and pseudoarthrosis formation of the left lateral sixth rib.     CONCLUSION: 1.  Enlargement of right inguinal and external iliac chain lymph  nodes concerning for progression of disease. 2.  Stable inflammatory stranding and small lymph nodes of the central mesentery and abdominal retroperitoneum.    Us Lymph Node Biopsy    Result Date: 1/7/2019  [facility] RADIOLOGY EXAM: 1. FINE-NEEDLE ASPIRATION RIGHT MEDIAL THIGH LYMPH NODE 2. CORE BIOPSY RIGHT MEDIAL THIGH LYMPH NODE 3. ULTRASOUND GUIDANCE LOCATION: [facility] DATE/STELLA: 1/7/2019 2:00 PM INDICATION: Lymphoma and prostate cancer with enlarging right inguinal/right thigh nodes. PROCEDURE: Informed consent obtained. Time out performed. The site was prepped and draped in sterile fashion. 5  mL of 1% lidocaine was infused into the local soft tissues. Under direct ultrasound guidance, multiple fine-needle aspirates of the nodule were obtained to evaluate with flow cytometry. The pathologist requested core needle samples for architectural assessment. 4 18-gauge core samples were obtained. The tissue was felt to be adequate by pathology. RADIOLOGIC SUPERVISION AND INTERPRETATION: ULTRASOUND GUIDANCE: Images demonstrate the needle within the nodule.     CONCLUSION: 1.  Status post ultrasound-guided fine-needle aspiration of a right medial thigh lymph node as requested by the pathologist 2.  status post ultrasound-guided core needle biopsy of a right medial thigh lymph node as requested by the pathologist. Reference CPT Codes: 57102, 96098        Signed by: Madhavi Kurtz, CNP

## 2021-06-23 NOTE — TELEPHONE ENCOUNTER
Nnacy calls in today on Juvenal's behalf having a question to ask after meeting with Madhavi Kurtz CNP yesterday.  She said something about wanting to wait to start chemo.  She asked that I call Juvenal back on his cell phone.  I tried calling him back and it went directly to voicemail so I left a message letting him know we got her message and are returning the call.    Will wait for him to call back.    Estrella Vargas RN

## 2021-06-23 NOTE — NURSING NOTE
"Oncology Rooming Note    June 23, 2021 8:30 AM   Juvenal Blake is a 55 year old male who presents for:    Chief Complaint   Patient presents with     Oncology Clinic Visit     Follicular lymphoma (H)     Initial Vitals: There were no vitals taken for this visit. Estimated body mass index is 38.24 kg/m  as calculated from the following:    Height as of 6/15/21: 1.74 m (5' 8.5\").    Weight as of an earlier encounter on 6/23/21: 115.8 kg (255 lb 3.2 oz). There is no height or weight on file to calculate BSA.  Data Unavailable Comment: Data Unavailable   No LMP for male patient.  Allergies reviewed: Yes  Medications reviewed: Yes    Medications: Medication refills not needed today.  Pharmacy name entered into [employer]: [id number] - [place], [place] - [address] AT [facility]    Clinical concerns: none       Cherie Jessica RN              "

## 2021-06-23 NOTE — TELEPHONE ENCOUNTER
[facility] Call Center    Phone Message    May a detailed message be left on voicemail: yes     Reason for Call: Other: Collette, a nurse over at the research center at the , called in regarding this Pt. He had an EKG done on 6/8/21, and the Research head Dr. Scott Zavala was wanting a nurse or physician to look over the EKG results and see if it is any different from his last. Pt is not a current cardiology  pt, but the EKG needs to be looked over by someone in cardio. Please inbasket or page Dr. Zavala with any info/questions, pager # [phone]     Action Taken: Message routed to:  [facility] ([facility] Cardio    Travel Screening: Not Applicable

## 2021-06-23 NOTE — PROGRESS NOTES
[facility] Hematology and Oncology Progress Note    Patient: Juvenal Blake  MRN: [id number]  Date of Service: 01/31/19          Reason for Visit    Chief Complaint   Patient presents with     HE Cancer     Follicular lymphoma grade i, lymph nodes of multiple sites       Assessment and Plan    1.  Follicular lymphoma, low-grade. Pt is currently on maintenance rituxan.  His last CT scan in December showed some possible progression in the right inguinal area.  PET scan shows recurrent lymphoma involving bilateral iliac nodes as well as right inguinal nodes.  All of the disease is below the diaphragm.  Overall the largest node states is 2.5 cm.  She does not currently have any constitutional symptoms.  He is anxious so he would like to get started on some treatment.  I did tell him we do have a clinical trial that is open but his neuropathy would have to be 5 cm he did have to have some constitutional symptoms before he can qualify for that study.  I did tell him we do not need to start treatment right now it probably would not change course and we can wait to see if he prefer to do it in a couple of months or if he would like to try to get on the clinical trial.  The option he said that he really felt most comfortable with his if he started chemo now he said that she is going to keep getting bigger and he does not want that to happen.  I do not know that he fully understands the follicular lymphoma and how it actually is in curable.  Both him and his wife had some inappropriate questions about his stage and being cured to try to reiterate the point that follicular lymphoma generally is an incurable disease but usually will need treatment on and off for it.  He has had to have chemo twice since he was diagnosed and that was in 2010. Dr. Crooks is deciding on regimen to do.  He is debating between [facility] her CVP.  He is not quite sure if he is going to continue Rituxan or try a different CD20 positive drug.  Patient will  get a MUGA scan in preparation for getting Adriamycin with [facility].  We will then call him with the plan and when he needs to come to start chemo next week.    2. Prostate cancer: s/p surgery and radiation, which finished around May 2018. PSA continues to be normal.     3. Sinus infection: will try levaquin 500mg daily for 7 more days. If no success, refer back to PCP or ENT. PET scan did show some changes in sinus.         ECOG Performance    0    Distress Assessment   mild    Pain  Currently in Pain: Yes  Pain Score (Initial OR Reassessment): 3  Location: shoulders:       Problem List    1. Encounter for monitoring cardiotoxic drug therapy  NM Muga      ______________________________________________________________________________    History of Present Illness    Diagnosis:     1. Follicular lymphoma: Diagnosed in April, 2010 . Stage IV diagnosis with bone marrow involvement. Initial disease involved the cervical, supraclavicular, axillary, mesenteric mass, retroperitoneal nodes, and possibly the manubrium.    2.  Prostate cancer:  Pathologic stage T3b prostate cancer.  Positive margins, multiple, and surgery.  3 lymph nodes were negative.  High risk for recurrent disease    Treatment:     Lymphoma:  He had 6 cycles of bendamustine and Rituxan completed in October, 2010.  He had 2 years of maintenance Rituxan therapy finishing in September, 2012.  Second course of bendamustine plus rituximab started February 20, 2017.  Cycle 6 was completed on July 11, 2017.    He is now on maintenance rituximab.     Prostate: Initial radical prostatectomy.  November 15, 2017.  Prostatic adenocarcinoma [place] 4+3 = 7 with tertiary pattern 5.  Tumor involves 45% of total surface area.  Positive for extensive extraprostatic extension.  Positive for bilateral seminal vesicle invasion.  Multiple margins positive.  Lymphovascular invasion not identified.  Lymph nodes negative.  3 were examined.  He received adjuvant radiation and  finished around May 2018.    Interim History:  Juvenal returns to the clinic.  He is here to follow-up on PET scan results.  Unfortunately it does show that he has a little bit more extensive disease than what we saw on CT scan.  Overall patient says his only complaint is that he feels tired and he feels that he continues to have a sinus infection.  He feels better when he is on antibiotics but as soon as the antibiotics are done he feels that the symptoms get worse.  Other than that he really has no other issues as just got anxious about the plan and what we are going to do    Pain Status  Currently in Pain: Yes    Review of Systems  Constitutional  Constitutional (WDL): All constitutional elements are within defined limits  Fatigue: No Concerns  Fever: No Concerns  Chills: No Concerns  Weight Gain: No Concerns  Weight Loss: No Concerns  Hot flashes/Night Sweats: No Concerns  Neurosensory  Neurosensory (WDL): All neurosensory elements are within defined limits  Peripheral Motor Neuropathy: No Concerns  Ataxia: No Concerns  Peripheral Sensory Neuropathy: No Concerns  Confusion: No Concerns  Dizziness: No Concerns  Syncope: No Concerns  Eye   Blurred Vision: Concerns(every morning; goes away on it's own)  Dry Eye: No Concerns  Eye Pain: No Concerns  Watering Eyes: No Concerns  Ear  Ear Disorder (WDL): All ear disorder elements are within defined limits  Ear Pain: No Concerns  Tinnitus: No Concerns  Cardiovascular  Cardiovascular (WDL): All cardiovascular elements are within defined limits  Palpitations: No Concerns  Edema: Concerns  SVT, DVT/PE: No Concerns  Chest Pain - Cardiac: No Concerns  Pulmonary  Cough: Concerns(productive; yellow phlegm)  Dyspnea: Concerns(worsens with activity)  Hypoxia: No Concerns  Gastrointestinal  Anorexia: No Concerns  Nausea: No Concerns  Vomiting: No Concerns  Dehydration: No Concerns  Dysgeusia: Concerns(bland taste to food)  Dysphagia: No Concerns  Mucositis Oral: No  Concerns  Esophagitis: No Concerns  Constipation: No Concerns  Diarrhea: No Concerns  Pharyngitis: No Concerns  Dry Mouth: No Concerns  Genitourinary  Genitourinary (WDL): All genitourinary elements are within defined limits  Urinary Frequency: No Concerns  Urinary Retention: No Concerns  Urinary Tract Pain: No Concerns  Lymphatic  Lymph (WDL): All lymph disorder elements are within defined limits  Lymphedema: No Concerns  Lymph Node Discomfort: No Concerns  Musculoskeletal and Connective Tissue  Musculoskeletal and Connetive Tissue Disorders (WDL): All Musculoskeletal and Connetive Tissue Disorder elements are within defined limits  Arthralgia: No Concerns(chronic shoulder pain)  Bone Pain: No Concerns  Muscle Weakness : No Concerns  Myalgia: No Concerns  Integumentary  Integumentary (WDL): All integumentary elements are within defined limits  Alopecia: No Concerns  Rash Maculo-Papular: No Concerns  Pruritus: No Concerns  Urticaria: No Concerns  Palmar-Plantar Erythrodysesthesia Syndrome: No Concerns  Flushing: No Concerns  Patient Coping  Patient Coping: Accepting  Accompanied by  Accompanied by: Family Member  Oral Chemo Adherence         Past History  Past Medical History:   Diagnosis Date     Arthritis     shoulder per H & P     GERD (gastroesophageal reflux disease)      H/O pyloric stenosis     as an infant per H & P      Inguinal hernia     per H & P      Lazy eye     per H & P      Non Hodgkin's lymphoma (H)      Prostate CA (H)      Sleep apnea     CPAP       PHYSICAL EXAM:  /71   Pulse 70   Wt (!) 249 lb (112.9 kg)   SpO2 99%   BMI 36.77 kg/m    GENERAL: no acute distress. Cooperative in conversation. Here with wife  No exam done today    Lab Results    Recent Results (from the past 168 hour(s))   POCT Glucose   Result Value Ref Range    Glucose 94 70 - 139 mg/dL       Imaging    Nm Pet Ct Skull To Mid Thigh    Result Date: 1/25/2019  [facility] RADIOLOGY EXAM: NM PET CT SKULL TO MID THIGH  LOCATION: [facility] DATE/TIME: 1/25/2019 3:00 PM INDICATION: Lymphoma, non-Hodgkin follicular, restaging. Evidence of recurrence on recent imaging. Subsequent treatment strategy. History of prostate cancer. COMPARISON: FDG PET/CT 08/10/2017. CT chest abdomen pelvis 12/31/2018 reviewed. TECHNIQUE: Serum glucose level 94 mg/dL. One hour post intravenous administration of 11.9 mCi F-18 FDG, PET imaging was performed from the skull base to the mid thighs utilizing attenuation correction with concurrent axial CT and PET/CT image fusion. Dose reduction techniques were used. FINDINGS: Since 08/10/2017, FDG avid adenopathy has developed involving multiple sites below the diaphragm. Findings similar to 12/31/2018. Jostin sites include bilateral iliac, right greater than left, and right inguinal regions. A representative right inguinal node measures 2.3 x 2.5 cm associated with marked uptake (SUVmax 13.2). Stranding in the upper abdomen with underlying micronodularity and low-level uptake suggesting sites of prior treated lymphoma. No FDG avid adenopathy above the diaphragm. Normal size liver and spleen with normal uptake. No suspicious focal skeletal uptake. Perineal surface contamination. Moderate mucosal thickening right maxillary sinus. Right IJ Port-A-Cath with tip near the SVC/RA junction. Mild diffuse hepatic steatosis. Small bilateral renal cysts. Mild atherosclerotic calcifications. Prostatectomy. Photopenia involving pelvic bones from prior radiation therapy. Mild scattered degenerative changes in the spine.     CONCLUSION: Findings consistent with recurrent lymphoma involving bilateral iliac and right inguinal lymph nodes below the diaphragm.    Us Lymph Node Biopsy    Result Date: 1/7/2019  [facility] RADIOLOGY EXAM: 1. FINE-NEEDLE ASPIRATION RIGHT MEDIAL THIGH LYMPH NODE 2. CORE BIOPSY RIGHT MEDIAL THIGH LYMPH NODE 3. ULTRASOUND GUIDANCE LOCATION: [facility] DATE/STELLA: 1/7/2019 2:00 PM INDICATION:  Lymphoma and prostate cancer with enlarging right inguinal/right thigh nodes. PROCEDURE: Informed consent obtained. Time out performed. The site was prepped and draped in sterile fashion. 5  mL of 1% lidocaine was infused into the local soft tissues. Under direct ultrasound guidance, multiple fine-needle aspirates of the nodule were obtained to evaluate with flow cytometry. The pathologist requested core needle samples for architectural assessment. 4 18-gauge core samples were obtained. The tissue was felt to be adequate by pathology. RADIOLOGIC SUPERVISION AND INTERPRETATION: ULTRASOUND GUIDANCE: Images demonstrate the needle within the nodule.     CONCLUSION: 1.  Status post ultrasound-guided fine-needle aspiration of a right medial thigh lymph node as requested by the pathologist 2.  status post ultrasound-guided core needle biopsy of a right medial thigh lymph node as requested by the pathologist. Reference CPT Codes: 85702, 33323      Total time spent with patient was 30 minutes.  Greater than 50% of that was in counseling and care coordination.    Signed by: Madhavi Kurtz CNP

## 2021-06-24 ENCOUNTER — HOSPITAL ENCOUNTER (INPATIENT)
Facility: CLINIC | Age: 56
LOS: 4 days | Discharge: HOME | DRG: 915 | End: 2021-06-28
Attending: EMERGENCY MEDICINE | Admitting: INTERNAL MEDICINE
Payer: COMMERCIAL

## 2021-06-24 ENCOUNTER — DOCUMENTATION ONLY (OUTPATIENT)
Dept: TRANSPLANT | Facility: CLINIC | Age: 56
End: 2021-06-24

## 2021-06-24 ENCOUNTER — ONCOLOGY VISIT (OUTPATIENT)
Dept: TRANSPLANT | Facility: CLINIC | Age: 56
End: 2021-06-24
Attending: PHYSICIAN ASSISTANT
Payer: COMMERCIAL

## 2021-06-24 ENCOUNTER — INFUSION THERAPY VISIT (OUTPATIENT)
Dept: TRANSPLANT | Facility: CLINIC | Age: 56
End: 2021-06-24
Payer: COMMERCIAL

## 2021-06-24 ENCOUNTER — APPOINTMENT (OUTPATIENT)
Dept: GENERAL RADIOLOGY | Facility: CLINIC | Age: 56
DRG: 915 | End: 2021-06-24
Attending: EMERGENCY MEDICINE
Payer: COMMERCIAL

## 2021-06-24 ENCOUNTER — E-CONSULT (OUTPATIENT)
Dept: CARDIOLOGY | Facility: CLINIC | Age: 56
End: 2021-06-24

## 2021-06-24 VITALS
WEIGHT: 256.7 LBS | HEART RATE: 60 BPM | OXYGEN SATURATION: 96 % | RESPIRATION RATE: 16 BRPM | SYSTOLIC BLOOD PRESSURE: 129 MMHG | DIASTOLIC BLOOD PRESSURE: 72 MMHG | BODY MASS INDEX: 38.46 KG/M2 | TEMPERATURE: 96.8 F

## 2021-06-24 VITALS
OXYGEN SATURATION: 99 % | TEMPERATURE: 100 F | RESPIRATION RATE: 32 BRPM | DIASTOLIC BLOOD PRESSURE: 71 MMHG | HEART RATE: 137 BPM | SYSTOLIC BLOOD PRESSURE: 115 MMHG

## 2021-06-24 DIAGNOSIS — T45.1X5A ADVERSE EFFECT OF ANTINEOPLASTIC ANTIBIOTIC, INITIAL ENCOUNTER: ICD-10-CM

## 2021-06-24 DIAGNOSIS — T88.6XXA ANAPHYLACTIC SHOCK, DUE TO ADVERSE EFFECT OF CORRECT MEDICINAL SUBSTANCE PROPERLY ADMINISTERED, INITIAL ENCOUNTER: ICD-10-CM

## 2021-06-24 DIAGNOSIS — C82.08 FOLLICULAR LYMPHOMA GRADE I, LYMPH NODES OF MULTIPLE SITES (H): Primary | ICD-10-CM

## 2021-06-24 DIAGNOSIS — R06.02 SHORTNESS OF BREATH: ICD-10-CM

## 2021-06-24 DIAGNOSIS — Z11.52 ENCOUNTER FOR SCREENING LABORATORY TESTING FOR SEVERE ACUTE RESPIRATORY SYNDROME CORONAVIRUS 2 (SARS-COV-2): ICD-10-CM

## 2021-06-24 DIAGNOSIS — C82.00 FOLLICULAR LYMPHOMA GRADE I, UNSPECIFIED BODY REGION (H): ICD-10-CM

## 2021-06-24 DIAGNOSIS — T78.2XXA ANAPHYLACTOID REACTION, INITIAL ENCOUNTER: ICD-10-CM

## 2021-06-24 DIAGNOSIS — R06.2 WHEEZING: ICD-10-CM

## 2021-06-24 DIAGNOSIS — R07.9 CHEST PAIN, UNSPECIFIED TYPE: ICD-10-CM

## 2021-06-24 DIAGNOSIS — Z85.72 HISTORY OF LYMPHOMA: Primary | ICD-10-CM

## 2021-06-24 DIAGNOSIS — I20.89 STABLE ANGINA PECTORIS (H): ICD-10-CM

## 2021-06-24 LAB
ALBUMIN SERPL-MCNC: 2.4 G/DL (ref 3.4–5)
ALBUMIN SERPL-MCNC: 2.4 G/DL (ref 3.4–5)
ALP SERPL-CCNC: 261 U/L (ref 40–150)
ALP SERPL-CCNC: 291 U/L (ref 40–150)
ALT SERPL W P-5'-P-CCNC: 92 U/L (ref 0–70)
ALT SERPL W P-5'-P-CCNC: 99 U/L (ref 0–70)
ANION GAP SERPL CALCULATED.3IONS-SCNC: 11 MMOL/L (ref 3–14)
ANION GAP SERPL CALCULATED.3IONS-SCNC: 8 MMOL/L (ref 3–14)
ANISOCYTOSIS BLD QL SMEAR: SLIGHT
AST SERPL W P-5'-P-CCNC: 75 U/L (ref 0–45)
AST SERPL W P-5'-P-CCNC: 95 U/L (ref 0–45)
BASOPHILS # BLD AUTO: 0 10E9/L (ref 0–0.2)
BASOPHILS NFR BLD AUTO: 0 %
BILIRUB SERPL-MCNC: 0.7 MG/DL (ref 0.2–1.3)
BILIRUB SERPL-MCNC: 0.8 MG/DL (ref 0.2–1.3)
BLD PROD TYP BPU: NORMAL
BLD PROD TYP BPU: NORMAL
BLD UNIT ID BPU: 0
BLD UNIT ID BPU: 0
BLOOD PRODUCT CODE: NORMAL
BLOOD PRODUCT CODE: NORMAL
BPU ID: NORMAL
BPU ID: NORMAL
BUN SERPL-MCNC: 18 MG/DL (ref 7–30)
BUN SERPL-MCNC: 20 MG/DL (ref 7–30)
CALCIUM SERPL-MCNC: 8.8 MG/DL (ref 8.5–10.1)
CALCIUM SERPL-MCNC: 8.9 MG/DL (ref 8.5–10.1)
CHLORIDE SERPL-SCNC: 102 MMOL/L (ref 94–109)
CHLORIDE SERPL-SCNC: 102 MMOL/L (ref 94–109)
CO2 SERPL-SCNC: 21 MMOL/L (ref 20–32)
CO2 SERPL-SCNC: 24 MMOL/L (ref 20–32)
CREAT SERPL-MCNC: 1.52 MG/DL (ref 0.66–1.25)
CREAT SERPL-MCNC: 1.55 MG/DL (ref 0.66–1.25)
DACRYOCYTES BLD QL SMEAR: SLIGHT
DIFFERENTIAL METHOD BLD: ABNORMAL
EOSINOPHIL # BLD AUTO: 0.8 10E9/L (ref 0–0.7)
EOSINOPHIL NFR BLD AUTO: 6 %
ERYTHROCYTE [DISTWIDTH] IN BLOOD BY AUTOMATED COUNT: 16.6 % (ref 10–15)
GFR SERPL CREATININE-BSD FRML MDRD: 49 ML/MIN/{1.73_M2}
GFR SERPL CREATININE-BSD FRML MDRD: 50 ML/MIN/{1.73_M2}
GLUCOSE SERPL-MCNC: 127 MG/DL (ref 70–99)
GLUCOSE SERPL-MCNC: 131 MG/DL (ref 70–99)
HCT VFR BLD AUTO: 22.4 % (ref 40–53)
HGB BLD-MCNC: 7 G/DL (ref 13.3–17.7)
INTERPRETATION ECG - MUSE: NORMAL
LABORATORY COMMENT REPORT: NORMAL
LDH SERPL L TO P-CCNC: 281 U/L (ref 85–227)
LYMPHOCYTES # BLD AUTO: 4.9 10E9/L (ref 0.8–5.3)
LYMPHOCYTES NFR BLD AUTO: 37.9 %
MCH RBC QN AUTO: 28.2 PG (ref 26.5–33)
MCHC RBC AUTO-ENTMCNC: 31.3 G/DL (ref 31.5–36.5)
MCV RBC AUTO: 90 FL (ref 78–100)
METAMYELOCYTES # BLD: 0.1 10E9/L
METAMYELOCYTES NFR BLD MANUAL: 0.9 %
MONOCYTES # BLD AUTO: 0.6 10E9/L (ref 0–1.3)
MONOCYTES NFR BLD AUTO: 4.3 %
NEUTROPHILS # BLD AUTO: 6.6 10E9/L (ref 1.6–8.3)
NEUTROPHILS NFR BLD AUTO: 50.9 %
PLATELET # BLD AUTO: 55 10E9/L (ref 150–450)
PLATELET # BLD EST: ABNORMAL 10*3/UL
POIKILOCYTOSIS BLD QL SMEAR: SLIGHT
POTASSIUM SERPL-SCNC: 4.2 MMOL/L (ref 3.4–5.3)
POTASSIUM SERPL-SCNC: 4.4 MMOL/L (ref 3.4–5.3)
PROT SERPL-MCNC: 5.6 G/DL (ref 6.8–8.8)
PROT SERPL-MCNC: 5.8 G/DL (ref 6.8–8.8)
RBC # BLD AUTO: 2.48 10E12/L (ref 4.4–5.9)
SARS-COV-2 RNA RESP QL NAA+PROBE: NEGATIVE
SODIUM SERPL-SCNC: 133 MMOL/L (ref 133–144)
SODIUM SERPL-SCNC: 134 MMOL/L (ref 133–144)
SPECIMEN SOURCE: NORMAL
TRANSFUSION STATUS PATIENT QL: NORMAL
WBC # BLD AUTO: 12.9 10E9/L (ref 4–11)

## 2021-06-24 PROCEDURE — 93005 ELECTROCARDIOGRAM TRACING: CPT | Performed by: EMERGENCY MEDICINE

## 2021-06-24 PROCEDURE — 82803 BLOOD GASES ANY COMBINATION: CPT

## 2021-06-24 PROCEDURE — 96374 THER/PROPH/DIAG INJ IV PUSH: CPT | Performed by: EMERGENCY MEDICINE

## 2021-06-24 PROCEDURE — 99223 1ST HOSP IP/OBS HIGH 75: CPT | Mod: AI | Performed by: INTERNAL MEDICINE

## 2021-06-24 PROCEDURE — 80053 COMPREHEN METABOLIC PANEL: CPT | Performed by: EMERGENCY MEDICINE

## 2021-06-24 PROCEDURE — 83615 LACTATE (LD) (LDH) ENZYME: CPT | Performed by: PHYSICIAN ASSISTANT

## 2021-06-24 PROCEDURE — 96413 CHEMO IV INFUSION 1 HR: CPT

## 2021-06-24 PROCEDURE — 94640 AIRWAY INHALATION TREATMENT: CPT | Performed by: EMERGENCY MEDICINE

## 2021-06-24 PROCEDURE — 250N000011 HC RX IP 250 OP 636

## 2021-06-24 PROCEDURE — 250N000011 HC RX IP 250 OP 636: Performed by: EMERGENCY MEDICINE

## 2021-06-24 PROCEDURE — 96375 TX/PRO/DX INJ NEW DRUG ADDON: CPT

## 2021-06-24 PROCEDURE — 250N000011 HC RX IP 250 OP 636: Performed by: PHYSICIAN ASSISTANT

## 2021-06-24 PROCEDURE — 999N001121 HC STATISTIC RESEARCH KIT COLLECTION

## 2021-06-24 PROCEDURE — 71045 X-RAY EXAM CHEST 1 VIEW: CPT | Mod: 26 | Performed by: RADIOLOGY

## 2021-06-24 PROCEDURE — 85025 COMPLETE CBC W/AUTO DIFF WBC: CPT | Performed by: PHYSICIAN ASSISTANT

## 2021-06-24 PROCEDURE — 80053 COMPREHEN METABOLIC PANEL: CPT | Performed by: PHYSICIAN ASSISTANT

## 2021-06-24 PROCEDURE — 96417 CHEMO IV INFUS EACH ADDL SEQ: CPT

## 2021-06-24 PROCEDURE — 206N000001 HC R&B BMT UMMC

## 2021-06-24 PROCEDURE — 99451 NTRPROF PH1/NTRNET/EHR 5/>: CPT | Performed by: INTERNAL MEDICINE

## 2021-06-24 PROCEDURE — 96376 TX/PRO/DX INJ SAME DRUG ADON: CPT

## 2021-06-24 PROCEDURE — 96361 HYDRATE IV INFUSION ADD-ON: CPT

## 2021-06-24 PROCEDURE — 96367 TX/PROPH/DG ADDL SEQ IV INF: CPT

## 2021-06-24 PROCEDURE — 71045 X-RAY EXAM CHEST 1 VIEW: CPT

## 2021-06-24 PROCEDURE — 250N000009 HC RX 250

## 2021-06-24 PROCEDURE — U0003 INFECTIOUS AGENT DETECTION BY NUCLEIC ACID (DNA OR RNA); SEVERE ACUTE RESPIRATORY SYNDROME CORONAVIRUS 2 (SARS-COV-2) (CORONAVIRUS DISEASE [COVID-19]), AMPLIFIED PROBE TECHNIQUE, MAKING USE OF HIGH THROUGHPUT TECHNOLOGIES AS DESCRIBED BY CMS-2020-01-R: HCPCS | Performed by: EMERGENCY MEDICINE

## 2021-06-24 PROCEDURE — 250N000013 HC RX MED GY IP 250 OP 250 PS 637

## 2021-06-24 PROCEDURE — 96372 THER/PROPH/DIAG INJ SC/IM: CPT

## 2021-06-24 PROCEDURE — 83605 ASSAY OF LACTIC ACID: CPT

## 2021-06-24 PROCEDURE — C9803 HOPD COVID-19 SPEC COLLECT: HCPCS | Performed by: EMERGENCY MEDICINE

## 2021-06-24 PROCEDURE — 94640 AIRWAY INHALATION TREATMENT: CPT | Mod: 76

## 2021-06-24 PROCEDURE — G0463 HOSPITAL OUTPT CLINIC VISIT: HCPCS | Mod: 25

## 2021-06-24 PROCEDURE — 96372 THER/PROPH/DIAG INJ SC/IM: CPT | Mod: XS

## 2021-06-24 PROCEDURE — 99291 CRITICAL CARE FIRST HOUR: CPT | Mod: 25 | Performed by: EMERGENCY MEDICINE

## 2021-06-24 PROCEDURE — U0005 INFEC AGEN DETEC AMPLI PROBE: HCPCS | Performed by: EMERGENCY MEDICINE

## 2021-06-24 PROCEDURE — 99215 OFFICE O/P EST HI 40 MIN: CPT

## 2021-06-24 PROCEDURE — 96415 CHEMO IV INFUSION ADDL HR: CPT

## 2021-06-24 PROCEDURE — 258N000003 HC RX IP 258 OP 636

## 2021-06-24 PROCEDURE — 99285 EMERGENCY DEPT VISIT HI MDM: CPT | Mod: 25 | Performed by: EMERGENCY MEDICINE

## 2021-06-24 PROCEDURE — 85025 COMPLETE CBC W/AUTO DIFF WBC: CPT | Performed by: EMERGENCY MEDICINE

## 2021-06-24 PROCEDURE — 93010 ELECTROCARDIOGRAM REPORT: CPT | Performed by: EMERGENCY MEDICINE

## 2021-06-24 RX ORDER — DIPHENHYDRAMINE HCL 25 MG
50 CAPSULE ORAL ONCE
Status: COMPLETED | OUTPATIENT
Start: 2021-06-24 | End: 2021-06-24

## 2021-06-24 RX ORDER — OXYCODONE HYDROCHLORIDE 5 MG/1
5 CAPSULE ORAL EVERY 12 HOURS PRN
Status: CANCELLED | OUTPATIENT
Start: 2021-06-24

## 2021-06-24 RX ORDER — PROCHLORPERAZINE MALEATE 5 MG
5 TABLET ORAL EVERY 6 HOURS PRN
Status: CANCELLED | OUTPATIENT
Start: 2021-06-24

## 2021-06-24 RX ORDER — ALBUTEROL SULFATE 90 UG/1
1-2 AEROSOL, METERED RESPIRATORY (INHALATION)
Status: COMPLETED | OUTPATIENT
Start: 2021-06-24 | End: 2021-06-24

## 2021-06-24 RX ORDER — HYDROXYZINE HYDROCHLORIDE 50 MG/1
50 TABLET, FILM COATED ORAL 3 TIMES DAILY PRN
Status: CANCELLED | OUTPATIENT
Start: 2021-06-24

## 2021-06-24 RX ORDER — HEPARIN SODIUM (PORCINE) LOCK FLUSH IV SOLN 100 UNIT/ML 100 UNIT/ML
5 SOLUTION INTRAVENOUS
Status: CANCELLED | OUTPATIENT
Start: 2021-06-24

## 2021-06-24 RX ORDER — HEPARIN SODIUM,PORCINE 10 UNIT/ML
5 VIAL (ML) INTRAVENOUS
Status: CANCELLED | OUTPATIENT
Start: 2021-06-24

## 2021-06-24 RX ORDER — LORAZEPAM 2 MG/ML
.5-1 INJECTION INTRAMUSCULAR EVERY 4 HOURS PRN
Status: CANCELLED | OUTPATIENT
Start: 2021-06-24

## 2021-06-24 RX ORDER — HEPARIN SODIUM (PORCINE) LOCK FLUSH IV SOLN 100 UNIT/ML 100 UNIT/ML
5 SOLUTION INTRAVENOUS
Status: DISCONTINUED | OUTPATIENT
Start: 2021-06-24 | End: 2021-06-24 | Stop reason: HOSPADM

## 2021-06-24 RX ORDER — PROCHLORPERAZINE MALEATE 5 MG
5-10 TABLET ORAL EVERY 6 HOURS PRN
Status: CANCELLED | OUTPATIENT
Start: 2021-06-24

## 2021-06-24 RX ORDER — METHYLPREDNISOLONE SODIUM SUCCINATE 125 MG/2ML
125 INJECTION, POWDER, LYOPHILIZED, FOR SOLUTION INTRAMUSCULAR; INTRAVENOUS ONCE
Status: COMPLETED | OUTPATIENT
Start: 2021-06-24 | End: 2021-06-24

## 2021-06-24 RX ORDER — ALBUTEROL SULFATE 0.83 MG/ML
SOLUTION RESPIRATORY (INHALATION)
Status: COMPLETED
Start: 2021-06-24 | End: 2021-06-24

## 2021-06-24 RX ORDER — ACETAMINOPHEN 325 MG/1
325-650 TABLET ORAL EVERY 4 HOURS PRN
Status: CANCELLED | OUTPATIENT
Start: 2021-06-24

## 2021-06-24 RX ORDER — FUROSEMIDE 10 MG/ML
20 INJECTION INTRAMUSCULAR; INTRAVENOUS ONCE
Status: DISCONTINUED | OUTPATIENT
Start: 2021-06-24 | End: 2021-06-24 | Stop reason: HOSPADM

## 2021-06-24 RX ORDER — EPINEPHRINE 1 MG/ML
0.3 INJECTION, SOLUTION INTRAMUSCULAR; SUBCUTANEOUS EVERY 5 MIN PRN
Status: DISCONTINUED | OUTPATIENT
Start: 2021-06-24 | End: 2021-06-24 | Stop reason: HOSPADM

## 2021-06-24 RX ORDER — TOLTERODINE 4 MG/1
4 CAPSULE, EXTENDED RELEASE ORAL DAILY
Status: CANCELLED | OUTPATIENT
Start: 2021-06-24

## 2021-06-24 RX ORDER — FLUTICASONE PROPIONATE 50 MCG
2 SPRAY, SUSPENSION (ML) NASAL DAILY
Status: CANCELLED | OUTPATIENT
Start: 2021-06-24

## 2021-06-24 RX ORDER — ALLOPURINOL 300 MG/1
300 TABLET ORAL DAILY
Status: CANCELLED | OUTPATIENT
Start: 2021-06-24

## 2021-06-24 RX ORDER — FUROSEMIDE 10 MG/ML
20 INJECTION INTRAMUSCULAR; INTRAVENOUS ONCE
Status: COMPLETED | OUTPATIENT
Start: 2021-06-24 | End: 2021-06-24

## 2021-06-24 RX ORDER — ALBUTEROL SULFATE 0.83 MG/ML
2.5 SOLUTION RESPIRATORY (INHALATION) ONCE
Status: COMPLETED | OUTPATIENT
Start: 2021-06-24 | End: 2021-06-24

## 2021-06-24 RX ORDER — MEPERIDINE HYDROCHLORIDE 25 MG/ML
12.5 INJECTION INTRAMUSCULAR; INTRAVENOUS; SUBCUTANEOUS ONCE
Status: COMPLETED | OUTPATIENT
Start: 2021-06-24 | End: 2021-06-24

## 2021-06-24 RX ORDER — LORAZEPAM 0.5 MG/1
0.5 TABLET ORAL
Status: CANCELLED | OUTPATIENT
Start: 2021-06-24

## 2021-06-24 RX ORDER — SODIUM CHLORIDE 9 MG/ML
INJECTION, SOLUTION INTRAVENOUS CONTINUOUS
Status: DISCONTINUED | OUTPATIENT
Start: 2021-06-25 | End: 2021-06-25

## 2021-06-24 RX ORDER — MEPERIDINE HYDROCHLORIDE 25 MG/ML
25 INJECTION INTRAMUSCULAR; INTRAVENOUS; SUBCUTANEOUS EVERY 30 MIN PRN
Status: DISCONTINUED | OUTPATIENT
Start: 2021-06-24 | End: 2021-06-24 | Stop reason: HOSPADM

## 2021-06-24 RX ORDER — LORAZEPAM 0.5 MG/1
.5-1 TABLET ORAL EVERY 4 HOURS PRN
Status: CANCELLED | OUTPATIENT
Start: 2021-06-24

## 2021-06-24 RX ORDER — ACETAMINOPHEN 325 MG/1
650 TABLET ORAL ONCE
Status: COMPLETED | OUTPATIENT
Start: 2021-06-24 | End: 2021-06-24

## 2021-06-24 RX ORDER — GRANISETRON HYDROCHLORIDE 1 MG/ML
1 INJECTION INTRAVENOUS ONCE
Status: COMPLETED | OUTPATIENT
Start: 2021-06-24 | End: 2021-06-24

## 2021-06-24 RX ORDER — LIDOCAINE 40 MG/G
CREAM TOPICAL
Status: DISCONTINUED | OUTPATIENT
Start: 2021-06-24 | End: 2021-06-27

## 2021-06-24 RX ORDER — AZELASTINE 1 MG/ML
1 SPRAY, METERED NASAL 2 TIMES DAILY PRN
Status: CANCELLED | OUTPATIENT
Start: 2021-06-24

## 2021-06-24 RX ADMIN — ALBUTEROL SULFATE 2 PUFF: 90 AEROSOL, METERED RESPIRATORY (INHALATION) at 15:30

## 2021-06-24 RX ADMIN — ALBUTEROL SULFATE 2.5 MG: 2.5 SOLUTION RESPIRATORY (INHALATION) at 16:39

## 2021-06-24 RX ADMIN — Medication 5 ML: at 08:11

## 2021-06-24 RX ADMIN — MEPERIDINE HYDROCHLORIDE 25 MG: 25 INJECTION INTRAMUSCULAR; INTRAVENOUS; SUBCUTANEOUS at 15:14

## 2021-06-24 RX ADMIN — SODIUM CHLORIDE 500 ML: 9 INJECTION, SOLUTION INTRAVENOUS at 08:46

## 2021-06-24 RX ADMIN — GRANISETRON HYDROCHLORIDE 1 MG: 1 INJECTION, SOLUTION INTRAVENOUS at 09:16

## 2021-06-24 RX ADMIN — CYCLOPHOSPHAMIDE 1000 MG: 1 INJECTION, POWDER, FOR SOLUTION INTRAVENOUS; ORAL at 09:58

## 2021-06-24 RX ADMIN — ALBUTEROL SULFATE 2.5 MG: 2.5 SOLUTION RESPIRATORY (INHALATION) at 15:49

## 2021-06-24 RX ADMIN — FUROSEMIDE 20 MG: 10 INJECTION, SOLUTION INTRAMUSCULAR; INTRAVENOUS at 09:36

## 2021-06-24 RX ADMIN — ALBUTEROL SULFATE 2.5 MG: 0.83 SOLUTION RESPIRATORY (INHALATION) at 16:39

## 2021-06-24 RX ADMIN — DIPHENHYDRAMINE HYDROCHLORIDE 50 MG: 25 CAPSULE ORAL at 13:50

## 2021-06-24 RX ADMIN — EPINEPHRINE 0.3 MG: 1 INJECTION INTRAMUSCULAR; INTRAVENOUS; SUBCUTANEOUS at 15:14

## 2021-06-24 RX ADMIN — FLUDARABINE PHOSPHATE 56 MG: 25 INJECTION, SOLUTION INTRAVENOUS at 13:15

## 2021-06-24 RX ADMIN — ALBUTEROL SULFATE 2 PUFF: 90 AEROSOL, METERED RESPIRATORY (INHALATION) at 15:19

## 2021-06-24 RX ADMIN — MEPERIDINE HYDROCHLORIDE 12.5 MG: 25 INJECTION INTRAMUSCULAR; INTRAVENOUS; SUBCUTANEOUS at 15:31

## 2021-06-24 RX ADMIN — EPINEPHRINE 0.3 MG: 1 INJECTION INTRAMUSCULAR; INTRAVENOUS; SUBCUTANEOUS at 15:36

## 2021-06-24 RX ADMIN — SODIUM CHLORIDE 500 ML: 9 INJECTION, SOLUTION INTRAVENOUS at 12:00

## 2021-06-24 RX ADMIN — METHYLPREDNISOLONE SODIUM SUCCINATE 125 MG: 125 INJECTION, POWDER, FOR SOLUTION INTRAMUSCULAR; INTRAVENOUS at 15:57

## 2021-06-24 RX ADMIN — RITUXIMAB-ABBS 900 MG: 10 INJECTION, SOLUTION INTRAVENOUS at 14:26

## 2021-06-24 RX ADMIN — ACETAMINOPHEN 650 MG: 325 TABLET ORAL at 13:51

## 2021-06-24 ASSESSMENT — PAIN SCALES - GENERAL
PAINLEVEL: MODERATE PAIN (4)
PAINLEVEL: NO PAIN (0)
PAINLEVEL: MILD PAIN (3)

## 2021-06-24 ASSESSMENT — ENCOUNTER SYMPTOMS
CHEST TIGHTNESS: 1
WHEEZING: 1
CHILLS: 1
SHORTNESS OF BREATH: 1

## 2021-06-24 NOTE — NURSING NOTE
"Chief Complaint   Patient presents with     Port Draw     port accessed and labs drawn by rn in lab.  vital signs taken.     Port accessed by RN in lab with 20g 3/4\" power needle, labs drawn, port flushed with saline and heparin, vitals checked, pt checked in for next appointment.    Rebecca Jessica RN      "

## 2021-06-24 NOTE — PROGRESS NOTES
Was called over to infusion to evaluate pt thought to be having acute Rituxan infusion reaction. Pt developed acute chest and shoulder tightness, wheezing/hypoxia requiring 3L oxygen, rigors, and temp of ~100. Rapid response was called. Pt received 2 doses of IM epinephrine, multiple doses of inhaled albuterol, 40mg IV demerol, and was subsequently transferred to ED via 911 for further management. Hand off was called to ED where it was recommended that pt be observed overnight at minimum, depending on course may be reasonable to admit patient as well as he is due for another dose of chemotherapy tomorrow. He is also due for a unit of RBCs and additional dose of lasix that was planned by previous provider but were not completed d/t acute reaction. BMT attending physician aware of pt.    Topher Haro PA-C  [phone]

## 2021-06-24 NOTE — ED NOTES
[facility]   ED Nurse to Floor Handoff     Juvenal Blake is a 55 year old male who speaks English and lives with a spouse,  in a home  They arrived in the ED by ambulance from clinic    ED Chief Complaint: Allergic Reaction    ED Dx;   Final diagnoses:   Anaphylactoid reaction, initial encounter         Needed?: No    Allergies:   Allergies   Allergen Reactions     Ondansetron Itching   .  Past Medical Hx:   Past Medical History:   Diagnosis Date     Cancer (H)      Non Hodgkin's lymphoma (H)      Shortness of breath       Baseline Mental status: WDL  Current Mental Status changes: at basesline    Infection present or suspected this encounter: no  Sepsis suspected: No  Isolation type: Enteric  Patient tested for COVID 19 prior to admission: YES     Activity level - Baseline/Home:  Independent  Activity Level - Current:   Stand with Assist    Bariatric equipment needed?: No    In the ED these meds were given:   Medications   sodium chloride (PF) 0.9% PF flush 3 mL (has no administration in time range)   sodium chloride (PF) 0.9% PF flush 3 mL (has no administration in time range)   0.9% sodium chloride BOLUS (1,000 mLs Intravenous Not Given 6/24/21 1553)   albuterol (PROVENTIL) (2.5 MG/3ML) 0.083% neb solution (2.5 mg  Given 6/24/21 1549)   methylPREDNISolone sodium succinate (solu-MEDROL) injection 125 mg (125 mg Intravenous Given 6/24/21 1557)   albuterol (PROVENTIL) neb solution 2.5 mg (2.5 mg Nebulization Given 6/24/21 1639)       Drips running?  No    Home pump  No    Current LDAs  Port A Cath Single 01/31/17 Right Chest wall (Active)   Number of days: 1605       Labs results:   Labs Ordered and Resulted from Time of ED Arrival Up to the Time of Departure from the ED   CBC WITH PLATELETS DIFFERENTIAL - Abnormal; Notable for the following components:       Result Value    RBC Count 2.61 (*)     Hemoglobin 7.3 (*)     Hematocrit 24.3 (*)     MCHC 30.0 (*)      RDW 16.9 (*)     Platelet Count 41 (*)     Nucleated RBCs 4 (*)     Absolute Myelocytes 0.1 (*)     Absolute Other Cells 0.7 (*)     All other components within normal limits   COMPREHENSIVE METABOLIC PANEL - Abnormal; Notable for the following components:    Glucose 131 (*)     Creatinine 1.52 (*)     GFR Estimate 50 (*)     GFR Estimate If Black 58 (*)     Albumin 2.4 (*)     Protein Total 5.8 (*)     Alkaline Phosphatase 291 (*)     ALT 99 (*)     AST 95 (*)     All other components within normal limits   SARS-COV-2 (COVID-19) VIRUS RT-PCR   PERIPHERAL IV CATHETER   PULSE OXIMETRY NURSING   CARDIAC CONTINUOUS MONITORING   NURSING DRAW AND HOLD       Imaging Studies:   Recent Results (from the past 24 hour(s))   XR Chest Port 1 View    Narrative    Portable chest    INDICATION: soa    COMPARISON: 6/11/2021    FINDINGS: Low respiratory volume. Old left rib fracture. Right IJ  catheter tip in the distal SVC. No pneumothorax.      Impression    IMPRESSION: Low inspiratory volume. Old left rib fracture.    ALIDA WERNER MD       Recent vital signs:   /71   Pulse 92   Temp 98.2  F (36.8  C) (Oral)   Resp 20   SpO2 97%     [place] Coma Scale Score: 15 (06/24/21 1544)       Cardiac Rhythm: Normal Sinus  Pt needs tele? No  Skin/wound Issues: None    Code Status: Full Code    Pain control: good    Nausea control: pt had none    Abnormal labs/tests/findings requiring intervention: see results     Family present during ED course? Yes   Family Comments/Social Situation comments:     Tasks needing completion: None    VINCENZO MALDONADO, RN  [phone] [facility] ED

## 2021-06-24 NOTE — PROGRESS NOTES
"Infusion Nursing Note:  Juvenal Blake presents today for day -4 cycle 1 Chemo.    Patient seen by provider today: Yes: Rossi Hardy   present during visit today: Not Applicable.    Note: Patient here for scheduled chemo. There are no parameters for treatment. Vitals and labs monitored. Patient was seen and assessed by Rossi Hardy before chemo. Per orders 500 ml of IVF given pre infusion as well as  IV Kytril. Per Rossi also give patient 20 mg of Lasix as he is retaining fluid and very short of breath, he says this is common for him. Lasix had positive results with 6 different occurrence of urination and patient report of feeling \"lighter\" and more comfortable. Patient then received Cytoxan over 2 hours per orders. Post 500 ml of IVF. Fludarabine given over 1 hour. Pre meds of tylenol and benadryl given 30 minutes prior or Rituxan. All chemo was double checked with 2 RNs prior and positive blood return noted as well. After first 30 min of Rituxan, patient complained 3-4/10 pain in his shoulders and chest. Also started rigoring. Infusion stopped. Vitals obtained and temp 100.0 F. Rapid response called. See flowsheets for vitals. Patient had audible wheezing and shortness of breath, 2-3 L nasal cannula placed on patient. THOMAS Arana arrived as well. EMT and rapid response gave 2 doses of Epi IM with decrease in wheezing and shortness of breath. 2 doses of Demerol IV were also given, see MAR with a little improvement in rigoring. Albuterol inhaler as well was given 2 times as well. No steroids were given to patient. 911 ambulance arrived and report was given and patient was taken by stretcher to the ER.      Intravenous Access:  Jacobs.    Treatment Conditions:  Lab Results   Component Value Date    HGB 7.0 06/24/2021     Lab Results   Component Value Date    WBC 12.9 06/24/2021      Lab Results   Component Value Date    ANEU 6.6 06/24/2021     Lab Results   Component Value Date    PLT 55 " 06/24/2021      Lab Results   Component Value Date     06/24/2021                   Lab Results   Component Value Date    POTASSIUM 4.2 06/24/2021           Lab Results   Component Value Date    MAG 2.1 06/23/2021            Lab Results   Component Value Date    CR 1.55 06/24/2021                   Lab Results   Component Value Date    TRE 8.8 06/24/2021                Lab Results   Component Value Date    BILITOTAL 0.7 06/24/2021           Lab Results   Component Value Date    ALBUMIN 2.4 06/24/2021                    Lab Results   Component Value Date    ALT 92 06/24/2021           Lab Results   Component Value Date    AST 75 06/24/2021       Results reviewed, labs MET treatment parameters, ok to proceed with treatment.      Post Infusion Assessment:  Patient tolerated infusion with incident  Blood return noted pre and post infusion.  Site patent and intact, free from redness, edema or discomfort.  No evidence of extravasations.  Saline locked.        Discharge Plan:   Patient discharged by stretcher with EMS to the ER accompanied by: wife.  Departure Mode: ambulance      PONCHO NGUYEN RN

## 2021-06-24 NOTE — ED PROVIDER NOTES
ED Provider Note  [facility]      History     Chief Complaint   Patient presents with     Allergic Reaction     The history is provided by the patient, medical records and the EMS personnel.     Juvenal Blake is a 55 year old male with PMH notable for non-Hodgkin's lymphoma enrolled in protocol FT6950-22 (utilizes Cy/Flu as a lymphodepletion regimen as bridge to [employer]) s/p radical prostatectomy, bilateral lymph node dissection, and radiation, recent C. Diff infection (finished 7 days of Vanco 6/23) who presents to the ED via EMS from infusion clinic for evaluation of an allergic reaction. Patient was in the infusion clinic for day 4 cycle 1 of chemo. Per chart review 500 ml of IVF given pre infusion as well as  IV Kytril. Patient also given 20 mg of Lasix as he is retaining fluid and very short of breath. Patient then received Cytoxan over 2 hours per orders. Fludarabine given over 1 hour. Pre meds of tylenol and benadryl given 30 minutes prior or Rituxan.  After first 30 min of Rituxan, patient complained 3-4/10 pain in his shoulders and chest. Also started rigoring. Infusion stopped. Vitals obtained and temp 100.0 F. Rapid response called. Patient had audible wheezing and shortness of breath, 2-3 L nasal cannula placed on patient. EMT and rapid response gave 2 doses of 0.3 Epi IM with some decrease in wheezing and shortness of breath. 2 doses of Demerol IV (24 and 12.5) were also given, with a little improvement in rigoring. Albuterol inhaler was given 2 times as well. No steroids were given to patient. Patient then transferred. Per EMS patient was tachycardic to 136, but never hypotensive en route. His sats were 90% so O2 increased to 5L with some improvement, but still having some chest tightness. He states that he is feeling better now, but still shaky. He has not been given a DuoNeb. He states that he has had reactions like this in the past. Patient has BLE edema with nacho socks  on and states that this has been worsening over the past month. Patients wife is aware and on her way.     Past Medical History  Past Medical History:   Diagnosis Date     Cancer (H)      Non Hodgkin's lymphoma (H)      Shortness of breath      Past Surgical History:   Procedure Laterality Date     HERNIA REPAIR, UMBILICAL       IR CHEST PORT PLACEMENT > 5 YRS OF AGE  2017     IR LYMPH NODE BIOPSY  10/1/2020     IR LYMPH NODE BIOPSY  2021     No current outpatient medications on file.    Allergies   Allergen Reactions     Ondansetron Itching     Family History  Family History   Problem Relation Age of Onset     Colon Cancer Mother      Lymphoma Father      No Known Problems Brother      No Known Problems Sister      No Known Problems Son      No Known Problems Sister      No Known Problems Son      Social History   Social History     Tobacco Use     Smoking status: Former Smoker     Quit date: 1995     Years since quittin.0     Smokeless tobacco: Never Used   Substance Use Topics     Alcohol use: Yes     Frequency: 4 or more times a week     Drinks per session: 3 or 4     Drug use: Yes     Types: Marijuana      Past medical history, past surgical history, medications, allergies, family history, and social history were reviewed with the patient. No additional pertinent items.       Review of Systems   Constitutional: Positive for chills.   Respiratory: Positive for chest tightness, shortness of breath and wheezing.    Cardiovascular: Positive for leg swelling.   All other systems reviewed and are negative.    A complete review of systems was performed with pertinent positives and negatives noted in the HPI, and all other systems negative.    Physical Exam   BP: 109/64  Pulse: 130  Temp: 98.2  F (36.8  C)  Resp: 20  Weight: 115.9 kg (255 lb 9.6 oz)  SpO2: 95 %  Physical Exam  Vitals signs and nursing note reviewed.   Constitutional:       General: He is in acute distress.      Appearance: He is  well-developed. He is ill-appearing. He is not toxic-appearing or diaphoretic.      Comments: Patient is awake and alert, he is tachypneic with audible wheezing and active rigors.  He appears mottled and anxious.  He is maintaining his airway without difficulty.   HENT:      Head: Normocephalic and atraumatic.      Mouth/Throat:      Lips: Pink.      Mouth: Mucous membranes are moist.      Pharynx: Oropharynx is clear. No oropharyngeal exudate.   Eyes:      General: Lids are normal. No scleral icterus.     Extraocular Movements: Extraocular movements intact.      Right eye: No nystagmus.      Left eye: No nystagmus.      Conjunctiva/sclera: Conjunctivae normal.      Pupils: Pupils are equal, round, and reactive to light.   Neck:      Musculoskeletal: Normal range of motion and neck supple. No erythema or neck rigidity.      Thyroid: No thyromegaly.      Vascular: No JVD.      Trachea: No tracheal deviation.   Cardiovascular:      Rate and Rhythm: Regular rhythm. Tachycardia present.      Pulses: Normal pulses.      Heart sounds: Normal heart sounds. No murmur. No friction rub. No gallop.    Pulmonary:      Effort: Pulmonary effort is normal. Tachypnea present. No respiratory distress.      Breath sounds: No stridor. Wheezing present.   Abdominal:      General: Bowel sounds are normal. There is distension.      Palpations: Abdomen is soft. There is no mass.      Tenderness: There is no abdominal tenderness. There is no guarding or rebound.   Musculoskeletal: Normal range of motion.         General: No tenderness.      Right lower leg: Edema present.      Left lower leg: Edema present.   Lymphadenopathy:      Cervical: No cervical adenopathy.   Skin:     General: Skin is dry.      Capillary Refill: Capillary refill takes less than 2 seconds.      Coloration: Skin is not pale.      Findings: No erythema or rash.      Comments: Skin is cool and mottled, especially in lower extremities.   Neurological:      Mental  Status: He is alert and oriented to person, place, and time.      Cranial Nerves: No cranial nerve deficit.      Sensory: No sensory deficit.      Motor: Motor function is intact.         ED Course      Procedures             EKG Interpretation:      Interpreted by Milton Dia MD    Symptoms at time of EKG: medication reaction, soa   Rhythm: sinus tachycardia  Rate: 112  Ectopy: premature ventricular contractions (frequent)  Conduction: left anterior fasciclar block  ST Segments/ T Waves: ST Segment Depression Lateral  Q Waves: nonspecific  Comparison to prior: Elevated heart rate compared to previous EKG and ST depression in lateral leads now apparent    Clinical Impression: Sinus tachycardia with frequent PVCs, lateral ST depression suggestive of ischemia but most likely rate related and secondary to increased demand from medication reaction         Critical Care Addendum    My initial assessment, based on my review of prehospital provider report, review of nursing observations, review of vital signs, focused history, physical exam, review of cardiac rhythm monitor and discussion with BMT service, established that Juvenal Blake has a severe medication reaction and anaphylactoid response, which requires immediate intervention, and therefore he is critically ill.     After the initial assessment, the care team initiated multiple lab tests, initiated medication therapy with solumedrol and albuterol and consulted with BMT to provide stabilization care. Due to the critical nature of this patient, I reassessed nursing observations, vital signs, physical exam, mental status and respiratory status multiple times prior to his disposition.     Time also spent performing documentation, reviewing test results, discussion with consultants and coordination of care.     Critical care time (excluding teaching time and procedures): 40 minutes.           Results for orders placed or performed in visit on 06/24/21    Lactate Dehydrogenase     Status: Abnormal   Result Value Ref Range    Lactate Dehydrogenase 281 (H) 85 - 227 U/L   CBC with platelets differential     Status: Abnormal   Result Value Ref Range    WBC 12.9 (H) 4.0 - 11.0 10e9/L    RBC Count 2.48 (L) 4.4 - 5.9 10e12/L    Hemoglobin 7.0 (L) 13.3 - 17.7 g/dL    Hematocrit 22.4 (L) 40.0 - 53.0 %    MCV 90 78 - 100 fl    MCH 28.2 26.5 - 33.0 pg    MCHC 31.3 (L) 31.5 - 36.5 g/dL    RDW 16.6 (H) 10.0 - 15.0 %    Platelet Count 55 (L) 150 - 450 10e9/L    Diff Method Manual Differential     % Neutrophils 50.9 %    % Lymphocytes 37.9 %    % Monocytes 4.3 %    % Eosinophils 6.0 %    % Basophils 0.0 %    % Metamyelocytes 0.9 %    Absolute Neutrophil 6.6 1.6 - 8.3 10e9/L    Absolute Lymphocytes 4.9 0.8 - 5.3 10e9/L    Absolute Monocytes 0.6 0.0 - 1.3 10e9/L    Absolute Eosinophils 0.8 (H) 0.0 - 0.7 10e9/L    Absolute Basophils 0.0 0.0 - 0.2 10e9/L    Absolute Metamyelocytes 0.1 (H) 0 10e9/L    Anisocytosis Slight     Poikilocytosis Slight     Teardrop Cells Slight     Platelet Estimate Confirming automated cell count    Comprehensive metabolic panel     Status: Abnormal   Result Value Ref Range    Sodium 134 133 - 144 mmol/L    Potassium 4.2 3.4 - 5.3 mmol/L    Chloride 102 94 - 109 mmol/L    Carbon Dioxide 24 20 - 32 mmol/L    Anion Gap 8 3 - 14 mmol/L    Glucose 127 (H) 70 - 99 mg/dL    Urea Nitrogen 20 7 - 30 mg/dL    Creatinine 1.55 (H) 0.66 - 1.25 mg/dL    GFR Estimate 49 (L) >60 mL/min/[1.73_m2]    GFR Estimate If Black 56 (L) >60 mL/min/[1.73_m2]    Calcium 8.8 8.5 - 10.1 mg/dL    Bilirubin Total 0.7 0.2 - 1.3 mg/dL    Albumin 2.4 (L) 3.4 - 5.0 g/dL    Protein Total 5.6 (L) 6.8 - 8.8 g/dL    Alkaline Phosphatase 261 (H) 40 - 150 U/L    ALT 92 (H) 0 - 70 U/L    AST 75 (H) 0 - 45 U/L        Medications   sodium chloride (PF) 0.9% PF flush 3 mL (has no administration in time range)   sodium chloride (PF) 0.9% PF flush 3 mL ( Intravenous Canceled Entry 6/25/21 0300)   0.9%  sodium chloride BOLUS (1,000 mLs Intravenous Not Given 6/24/21 8971)   lidocaine (LMX4) cream (has no administration in time range)   lidocaine 1 % 0.1-1 mL (has no administration in time range)   sodium chloride (PF) 0.9% PF flush 3 mL (has no administration in time range)   sodium chloride (PF) 0.9% PF flush 3 mL (has no administration in time range)   lidocaine 1 % 0.1-1 mL (has no administration in time range)   lidocaine (LMX4) cream (has no administration in time range)   acetaminophen (TYLENOL) tablet 325-650 mg (has no administration in time range)   acetaminophen (TYLENOL) tablet 325-650 mg (has no administration in time range)   prochlorperazine (COMPAZINE) injection 5-10 mg (has no administration in time range)     Or   prochlorperazine (COMPAZINE) tablet 5-10 mg (has no administration in time range)   LORazepam (ATIVAN) injection 0.5-1 mg (has no administration in time range)     Or   LORazepam (ATIVAN) tablet 0.5-1 mg (has no administration in time range)   allopurinol (ZYLOPRIM) tablet 300 mg (has no administration in time range)   fluticasone (FLONASE) 50 MCG/ACT spray 2 spray (has no administration in time range)   hydrOXYzine (ATARAX) tablet 50 mg (has no administration in time range)   loperamide (IMODIUM A-D) tablet 2 mg (has no administration in time range)   LORazepam (ATIVAN) tablet 0.5 mg (has no administration in time range)   omeprazole (priLOSEC) CR capsule 20 mg (has no administration in time range)   oxyCODONE (ROXICODONE) tablet 5 mg (has no administration in time range)   prochlorperazine (COMPAZINE) tablet 5 mg (has no administration in time range)   heparin lock flush 10 UNIT/ML injection 5-10 mL (has no administration in time range)   heparin lock flush 10 UNIT/ML injection 5-10 mL (has no administration in time range)   heparin 100 UNIT/ML injection 5 mL (has no administration in time range)   furosemide (LASIX) injection 20 mg (has no administration in time range)   fluconazole  (DIFLUCAN) tablet 100 mg (has no administration in time range)   acyclovir (ZOVIRAX) capsule 800 mg (has no administration in time range)   naloxone (NARCAN) injection 0.2 mg (has no administration in time range)     Or   naloxone (NARCAN) injection 0.4 mg (has no administration in time range)     Or   naloxone (NARCAN) injection 0.2 mg (has no administration in time range)     Or   naloxone (NARCAN) injection 0.4 mg (has no administration in time range)   naloxone (NARCAN) injection 0.2 mg (has no administration in time range)     Or   naloxone (NARCAN) injection 0.4 mg (has no administration in time range)     Or   naloxone (NARCAN) injection 0.2 mg (has no administration in time range)     Or   naloxone (NARCAN) injection 0.4 mg (has no administration in time range)   psyllium (METAMUCIL/KONSYL) Packet 1 packet (has no administration in time range)   acetaminophen (TYLENOL) tablet 325 mg (has no administration in time range)   diphenhydrAMINE (BENADRYL) capsule 25 mg (has no administration in time range)   albuterol (PROVENTIL) (2.5 MG/3ML) 0.083% neb solution (2.5 mg  Given 6/24/21 1549)   methylPREDNISolone sodium succinate (solu-MEDROL) injection 125 mg (125 mg Intravenous Given 6/24/21 1557)   albuterol (PROVENTIL) neb solution 2.5 mg (2.5 mg Nebulization Given 6/24/21 1639)        Assessments & Plan (with Medical Decision Making)     This patient presented to the emergency department after having a severe reaction to Rituxan at the infusion center today.  At presentation he appeared mottled, tachypneic and was hypoxic on room air with wheezing noted bilaterally.  He had already received 2 doses of epinephrine and albuterol through a metered-dose inhaler at the infusion center.  Oxygen was applied and 2 albuterol nebs were given.  125 mg of Solu-Medrol was given IV.  Patient appears significantly volume overloaded and was not overtly hypotensive here in the emergency department so I held back on fluid  resuscitation.  Patient was frequently reassessed and over the course of an hour improved.  Mottling dissipated as did wheezing.  He appeared more comfortable and it was able to sit up on the edge of the bed.  His rigors stopped.  I spoke with the bone marrow transplant team and patient will be admitted to their service for further treatment and evaluation.  They had planned on giving the patient a unit of packed red blood cells at the infusion center according to the provider I spoke to from the infusion center, this was not started in the emergency department and decision to transfuse will be deferred to the admitting service.    I have reviewed the nursing notes. I have reviewed the findings, diagnosis, plan and need for follow up with the patient.    Current Discharge Medication List          Final diagnoses:   Anaphylactoid reaction, initial encounter   I, Kayla Chaudhry, am serving as a trained medical scribe to document services personally performed by Milton Dia MD, based on the provider's statements to me.     I, Milton Dia MD, was physically present and have reviewed and verified the accuracy of this note documented by Kayla Chaudhry.      --  Milton Dia MD  [facility] EMERGENCY DEPARTMENT  6/24/2021     Milton Dia MD  06/25/21 8263

## 2021-06-24 NOTE — ED TRIAGE NOTES
Pt BIBA from clinic with reaction to rituxan chemo infusion. Pt tachycardic, tachypneic, SOB, chest/shoulder pain, and skin mottled upon arrival. Albuterol x2, 0.3 IM epi x2, 24mg demerol, and 12.5mg demerol given at clinic.

## 2021-06-24 NOTE — LETTER
"    6/24/2021         RE: Juvenal Blake  [address] [place] [address]        Dear Colleague,    Thank you for referring your patient, Juvenal Blake, to the [facility] BLOOD AND MARROW TRANSPLANT PROGRAM [place]. Please see a copy of my visit note below.    Infusion Nursing Note:  Juvenal Blake presents today for day -4 cycle 1 Chemo.    Patient seen by provider today: Yes: Rossi Hardy   present during visit today: Not Applicable.    Note: Patient here for scheduled chemo. There are no parameters for treatment. Vitals and labs monitored. Patient was seen and assessed by Rossi Hardy before chemo. Per orders 500 ml of IVF given pre infusion as well as  IV Kytril. Per Rossi also give patient 20 mg of Lasix as he is retaining fluid and very short of breath, he says this is common for him. Lasix had positive results with 6 different occurrence of urination and patient report of feeling \"lighter\" and more comfortable. Patient then received Cytoxan over 2 hours per orders. Post 500 ml of IVF. Fludarabine given over 1 hour. Pre meds of tylenol and benadryl given 30 minutes prior or Rituxan. All chemo was double checked with 2 RNs prior and positive blood return noted as well. After first 30 min of Rituxan, patient complained 3-4/10 pain in his shoulders and chest. Also started rigoring. Infusion stopped. Vitals obtained and temp 100.0 F. Rapid response called. See flowsheets for vitals. Patient had audible wheezing and shortness of breath, 2-3 L nasal cannula placed on patient. THOMAS Arana arrived as well. EMT and rapid response gave 2 doses of Epi IM with decrease in wheezing and shortness of breath. 2 doses of Demerol IV were also given, see MAR with a little improvement in rigoring. Albuterol inhaler as well was given 2 times as well. No steroids were given to patient. 911 ambulance arrived and report was given and patient was taken by stretcher to the " ER.      Intravenous Access:  Jacobs.    Treatment Conditions:  Lab Results   Component Value Date    HGB 7.0 06/24/2021     Lab Results   Component Value Date    WBC 12.9 06/24/2021      Lab Results   Component Value Date    ANEU 6.6 06/24/2021     Lab Results   Component Value Date    PLT 55 06/24/2021      Lab Results   Component Value Date     06/24/2021                   Lab Results   Component Value Date    POTASSIUM 4.2 06/24/2021           Lab Results   Component Value Date    MAG 2.1 06/23/2021            Lab Results   Component Value Date    CR 1.55 06/24/2021                   Lab Results   Component Value Date    TRE 8.8 06/24/2021                Lab Results   Component Value Date    BILITOTAL 0.7 06/24/2021           Lab Results   Component Value Date    ALBUMIN 2.4 06/24/2021                    Lab Results   Component Value Date    ALT 92 06/24/2021           Lab Results   Component Value Date    AST 75 06/24/2021       Results reviewed, labs MET treatment parameters, ok to proceed with treatment.      Post Infusion Assessment:  Patient tolerated infusion with incident  Blood return noted pre and post infusion.  Site patent and intact, free from redness, edema or discomfort.  No evidence of extravasations.  Saline locked.        Discharge Plan:   Patient discharged by stretcher with EMS to the ER accompanied by: wife.  Departure Mode: ambulance      PONCHO NGUYEN RN                          Again, thank you for allowing me to participate in the care of your patient.        Sincerely,        [facility]

## 2021-06-24 NOTE — LETTER
6/24/2021         RE: Juvenal Blake  [address] [place] [address]        Dear Colleague,    Thank you for referring your patient, Juvenal Blake, to the [facility] BLOOD AND MARROW TRANSPLANT PROGRAM [place]. Please see a copy of my visit note below.    Was called over to infusion to evaluate pt thought to be having acute Rituxan infusion reaction. Pt developed acute chest and shoulder tightness, wheezing/hypoxia requiring 3L oxygen, rigors, and temp of ~100. Rapid response was called. Pt received 2 doses of IM epinephrine, multiple doses of inhaled albuterol, 40mg IV demerol, and was subsequently transferred to ED via 911 for further management. Hand off was called to ED where it was recommended that pt be observed overnight at minimum, depending on course may be reasonable to admit patient as well as he is due for another dose of chemotherapy tomorrow. He is also due for a unit of RBCs and additional dose of lasix that was planned by previous provider but were not completed d/t acute reaction. BMT attending physician aware of pt.    Topher Haro PA-C  [phone]        Again, thank you for allowing me to participate in the care of your patient.        Sincerely,        BMT Advanced Practice Provider

## 2021-06-24 NOTE — NURSING NOTE
Chief Complaint   Patient presents with     Infusion     pt here for Day -4 cycle 1 chemo for follicular lymphoma

## 2021-06-24 NOTE — PROGRESS NOTES
The pt was receiving an infusion when he started to have rigors, spike a fever, and SOB. The infusion was stopped and RRT called. Upon arrival the pt is A&Ox4 skin is pale warm and dry. Pt states that he is SOB with audible expiatory wheezes. Benadryl was given prior to arrival. Due to the SOB and wheezes 0.3 of EPI is given. Albuterol was given for SOB After 10 minutes and no improvement 0.3 of Epi is given. BP is 152/P. O2 94% on NC@3LPM. Upon EMS arrival pt report and care is handed off.

## 2021-06-24 NOTE — NURSING NOTE
"Oncology Rooming Note    June 24, 2021 8:44 AM   Juvenal Blake is a 55 year old male who presents for:    Chief Complaint   Patient presents with     Port Draw     port accessed and labs drawn by rn in lab.  vital signs taken.     RECHECK     pt here for day -4 cycle 1 Chemo for Lymphoma     Initial Vitals: /72 (BP Location: Right arm, Patient Position: Sitting, Cuff Size: Adult Regular)   Pulse 60   Temp 96.8  F (36  C) (Tympanic)   Resp 16   Wt 116.4 kg (256 lb 11.2 oz)   SpO2 96%   BMI 38.46 kg/m   Estimated body mass index is 38.46 kg/m  as calculated from the following:    Height as of 6/15/21: 1.74 m (5' 8.5\").    Weight as of this encounter: 116.4 kg (256 lb 11.2 oz). Body surface area is 2.37 meters squared.  No Pain (0) Comment: Data Unavailable   No LMP for male patient.  Allergies reviewed: Yes  Medications reviewed: Yes    Medications: Medication refills not needed today.  Pharmacy name entered into [facility]: [id number] - [place], [place] - [address] AT [facility]    Clinical concerns: none      PONCHO NGUYEN RN              "

## 2021-06-24 NOTE — LETTER
6/24/2021         RE: Juvenal Blake  [address] [place] [address]        Dear Colleague,    Thank you for referring your patient, Juvenal Blake, to the [facility] BLOOD AND MARROW TRANSPLANT PROGRAM [place]. Please see a copy of my visit note below.    BMT Clinic Note   Jun 24, 2021      Juvenal Blake is a 55 year old year old male diagnosed with NHL.  He has been treated with Bendamustine + Rituxan then Bendamustine +  Rituxan once again the R-CHOP followed by Nam-NK cell infusion. He is enrolled in protocol [id number], which utilizes Cy/Flu as a lymphodepletion regimen as bridge to Yescarta.    HPI:   Belly pain less today. Very tight/firm. Having BM's. Short/shallow breaths due to this. Lots of LE edema. Wearing nacho stockings. Eating/drinking. Tired/weak.     ROS:  10 point ROS negative except as above.     Physical Exam:   General: Conversant. KPS 70 +jaundiced and pale appearing  Eyes: JOE   Lungs: CTA bilaterally. +shallow breathing due to abdomen. +tachypnic     Cardiovascular: RRR, no M/R/G   Abdominal/Rectal: ++protuberant, ++firm to palpation. + BS  Lymphatics: 2-3+ pitting edema bilateral shins  Skin: no rashes or petechaie;  jaundiced appearance to face  Neuro: A&O   Additional Findings: Jacobs site NT, no drainage.    Pre-treatment ICANS: 10/10.  Patient has writing log with him.      Labs:  Lab Results   Component Value Date    WBC 12.9 (H) 06/24/2021    ANEU 7.1 06/23/2021    HGB 7.0 (L) 06/24/2021    HCT 22.4 (L) 06/24/2021    PLT 55 (L) 06/24/2021     06/23/2021    POTASSIUM 4.3 06/23/2021    CHLORIDE 100 06/23/2021    CO2 26 06/23/2021     (H) 06/23/2021    BUN 18 06/23/2021    CR 1.40 (H) 06/23/2021    MAG 2.1 06/23/2021    INR 0.97 06/04/2021    AST 75 (H) 06/23/2021    ALT 90 (H) 06/23/2021       Assessment and Plan:   1.  Lymphoma:  - He had 6 cycles of bendamustine and Rituxan completed in October, 2010. He had 2 years of maintenance Rituxan therapy  finishing in September, 2012.  - Second course of bendamustine plus rituximab started February 20, 2017. Cycle 6 was completed on July 11, 2017. Then had maintenance rituximab through January, 2019.  - O-CHOP started at second relapse. Cycle 1 given April 23, 2019. Cycle 6 given August 16, 2019.  Haplo NAM-NK therapy at [facility] September 1, 2020  - Iidelalisib started October 21, 2020 [facility]. 150 mg twice daily.  stopped Idelalisib - last dose was on 6/10 at 8am.   - Eligible to proceed with therapy with retreatment ; flu/cy 6/23; flu/cy/rituxan 6/24; flu/cy 6/25.  - on allopurinol. I don't see a uric acid--ordered for tomorrow was 3.8 on 6/15.     2.  HEME:  - Needs 1UPRBC. Pending timing/volume/how he does with chemo and he is symptomatic.    3.  :   Initial radical prostatectomy and bilateral lymph node dissection. November 15, 2017. Prostatic adenocarcinoma [place] 4+3 = 7 with tertiary pattern 5. Tumor involves 45% of total surface area. Positive for extensive extraprostatic extension. Positive for bilateral seminal vesicle invasion. Multiple margins positive. Lymphovascular invasion not identified. Perineural invasion was noted. Lymph nodes negative. 3 were examined (2 right obturator and 1 left obturator).    Completed radiation to the prostate fossa and pelvic lymph nodes at [facility] early May 2018. He received 4500 cGy in 25 fractions. He then had 2340 cGy boost in 13 fractions to the prostatic fossa, for a total dose of 6240 cGy in 38 treatment fractions delivered over 54 days. He did not receive hormonal therapy:      4.  ID:    C diff colitis: finished vancomycin 6/23 (treated for 7 days).   COVID negative. RVP + for Rhinovirus 6/11; asymptomatic.  - not neutropenic and not on prophy.     5.  FEN/Renal:   - Monitor Cr/lytes.  - Volume Overload: 20mg IV lasix may repeat this afternoon pending how he is doing.   - ALEJANDRO secondary to disease likely. Maybe diuresis will actually help  congestion.    6. Abdominal Pain secondary to cancer.  Also now seeing a rise in LFTs, which suspect to be related.   Has oxycodone 15 tabs and lorazepam 15 tabs for difficulty sleeping. Aware not to take both together due to sedation    Final Plan:  RTC 6/25 for chemo. Low threshold for admission: given volume overload, abdominal distention affecting resp status, TL risk, ALEJANDRO/LFT's. Concern for rapidly progressing disease.     I spent 45 minutes in the care of this patient today, which included time necessary for preparation for the visit, obtaining history, ordering medications/tests/procedures as medically indicated, review of pertinent medical literature, counseling of the patient, communication of recommendations to the care team, and documentation time.    Rossi Hardy PA-c  [phone]        Again, thank you for allowing me to participate in the care of your patient.        Sincerely,        BMT Advanced Practice Provider

## 2021-06-24 NOTE — PROGRESS NOTES
BMT Clinic Note   Jun 24, 2021      Juvenal Blake is a 55 year old year old male diagnosed with NHL.  He has been treated with Bendamustine + Rituxan then Bendamustine +  Rituxan once again the R-CHOP followed by Nam-NK cell infusion. He is enrolled in protocol [id number], which utilizes Cy/Flu as a lymphodepletion regimen as bridge to Yescarta.    HPI:   Belly pain less today. Very tight/firm. Having BM's. Short/shallow breaths due to this. Lots of LE edema. Wearing nacho stockings. Eating/drinking. Tired/weak.     ROS:  10 point ROS negative except as above.     Physical Exam:   General: Conversant. KPS 70 +jaundiced and pale appearing  Eyes: JOE   Lungs: CTA bilaterally. +shallow breathing due to abdomen. +tachypnic     Cardiovascular: RRR, no M/R/G   Abdominal/Rectal: ++protuberant, ++firm to palpation. + BS  Lymphatics: 2-3+ pitting edema bilateral shins  Skin: no rashes or petechaie;  jaundiced appearance to face  Neuro: A&O   Additional Findings: Jacobs site NT, no drainage.    Pre-treatment ICANS: 10/10.  Patient has writing log with him.      Labs:  Lab Results   Component Value Date    WBC 12.9 (H) 06/24/2021    ANEU 7.1 06/23/2021    HGB 7.0 (L) 06/24/2021    HCT 22.4 (L) 06/24/2021    PLT 55 (L) 06/24/2021     06/23/2021    POTASSIUM 4.3 06/23/2021    CHLORIDE 100 06/23/2021    CO2 26 06/23/2021     (H) 06/23/2021    BUN 18 06/23/2021    CR 1.40 (H) 06/23/2021    MAG 2.1 06/23/2021    INR 0.97 06/04/2021    AST 75 (H) 06/23/2021    ALT 90 (H) 06/23/2021       Assessment and Plan:   1.  Lymphoma:  - He had 6 cycles of bendamustine and Rituxan completed in October, 2010. He had 2 years of maintenance Rituxan therapy finishing in September, 2012.  - Second course of bendamustine plus rituximab started February 20, 2017. Cycle 6 was completed on July 11, 2017. Then had maintenance rituximab through January, 2019.  - O-CHOP started at second relapse. Cycle 1 given April 23, 2019. Cycle 6 given  August 16, 2019.  [facility]  - Iidelalisib started October 21, 2020 [facility]. 150 mg twice daily.  stopped Idelalisib - last dose was on 6/10 at 8am.   - Eligible to proceed with therapy with retreatment ; flu/cy 6/23; flu/cy/rituxan 6/24; flu/cy 6/25.  - on allopurinol. I don't see a uric acid--ordered for tomorrow was 3.8 on 6/15.     2.  HEME:  - Needs 1UPRBC. Pending timing/volume/how he does with chemo and he is symptomatic.    3.  :   Initial radical prostatectomy and bilateral lymph node dissection. November 15, 2017. Prostatic adenocarcinoma [place] 4+3 = 7 with tertiary pattern 5. Tumor involves 45% of total surface area. Positive for extensive extraprostatic extension. Positive for bilateral seminal vesicle invasion. Multiple margins positive. Lymphovascular invasion not identified. Perineural invasion was noted. Lymph nodes negative. 3 were examined (2 right obturator and 1 left obturator).    Completed radiation to the prostate fossa and pelvic lymph nodes at [facility] early May 2018. He received 4500 cGy in 25 fractions. He then had 2340 cGy boost in 13 fractions to the prostatic fossa, for a total dose of 6240 cGy in 38 treatment fractions delivered over 54 days. He did not receive hormonal therapy:      4.  ID:    C diff colitis: finished vancomycin 6/23 (treated for 7 days).   COVID negative. RVP + for Rhinovirus 6/11; asymptomatic.  - not neutropenic and not on prophy.     5.  FEN/Renal:   - Monitor Cr/lytes.  - Volume Overload: 20mg IV lasix may repeat this afternoon pending how he is doing.   - ALEJANDRO secondary to disease likely. Maybe diuresis will actually help congestion.    6. Abdominal Pain secondary to cancer.  Also now seeing a rise in LFTs, which suspect to be related.   Has oxycodone 15 tabs and lorazepam 15 tabs for difficulty sleeping. Aware not to take both together due to sedation    Final Plan:  RTC 6/25 for chemo. Low  threshold for admission: given volume overload, abdominal distention affecting resp status, TL risk, ALEJANDRO/LFT's. Concern for rapidly progressing disease.     I spent 45 minutes in the care of this patient today, which included time necessary for preparation for the visit, obtaining history, ordering medications/tests/procedures as medically indicated, review of pertinent medical literature, counseling of the patient, communication of recommendations to the care team, and documentation time.    Rossi Hardy PA-c  [phone]

## 2021-06-25 ENCOUNTER — APPOINTMENT (OUTPATIENT)
Dept: ULTRASOUND IMAGING | Facility: CLINIC | Age: 56
DRG: 915 | End: 2021-06-25
Attending: STUDENT IN AN ORGANIZED HEALTH CARE EDUCATION/TRAINING PROGRAM
Payer: COMMERCIAL

## 2021-06-25 ENCOUNTER — APPOINTMENT (OUTPATIENT)
Dept: PHYSICAL THERAPY | Facility: CLINIC | Age: 56
DRG: 915 | End: 2021-06-25
Attending: STUDENT IN AN ORGANIZED HEALTH CARE EDUCATION/TRAINING PROGRAM
Payer: COMMERCIAL

## 2021-06-25 ENCOUNTER — APPOINTMENT (OUTPATIENT)
Dept: CARDIOLOGY | Facility: CLINIC | Age: 56
DRG: 915 | End: 2021-06-25
Attending: STUDENT IN AN ORGANIZED HEALTH CARE EDUCATION/TRAINING PROGRAM
Payer: COMMERCIAL

## 2021-06-25 LAB
ALBUMIN SERPL-MCNC: 2.2 G/DL (ref 3.4–5)
ALP SERPL-CCNC: 265 U/L (ref 40–150)
ALT SERPL W P-5'-P-CCNC: 90 U/L (ref 0–70)
ANION GAP SERPL CALCULATED.3IONS-SCNC: 7 MMOL/L (ref 3–14)
ANISOCYTOSIS BLD QL SMEAR: SLIGHT
APTT PPP: 34 SEC (ref 22–37)
AST SERPL W P-5'-P-CCNC: 72 U/L (ref 0–45)
BASOPHILS # BLD AUTO: 0.1 10E9/L (ref 0–0.2)
BASOPHILS NFR BLD AUTO: 0 %
BASOPHILS NFR BLD AUTO: 1 %
BILIRUB SERPL-MCNC: 0.6 MG/DL (ref 0.2–1.3)
BLASTS BLD QL SMEAR: 8 %
BLD PROD TYP BPU: NORMAL
BLD UNIT ID BPU: 0
BLOOD PRODUCT CODE: NORMAL
BPU ID: NORMAL
BUN SERPL-MCNC: 26 MG/DL (ref 7–30)
CALCIUM SERPL-MCNC: 8.6 MG/DL (ref 8.5–10.1)
CHLORIDE SERPL-SCNC: 105 MMOL/L (ref 94–109)
CO2 BLDCOV-SCNC: 21 MMOL/L (ref 21–28)
CO2 SERPL-SCNC: 24 MMOL/L (ref 20–32)
COPATH REPORT: NORMAL
COPATH REPORT: NORMAL
CREAT SERPL-MCNC: 1.18 MG/DL (ref 0.66–1.25)
DIFFERENTIAL METHOD BLD: ABNORMAL
DIFFERENTIAL METHOD BLD: NORMAL
EOSINOPHIL # BLD AUTO: 0.2 10E9/L (ref 0–0.7)
EOSINOPHIL NFR BLD AUTO: 0 %
EOSINOPHIL NFR BLD AUTO: 4.5 %
ERYTHROCYTE [DISTWIDTH] IN BLOOD BY AUTOMATED COUNT: 16.7 % (ref 10–15)
ERYTHROCYTE [DISTWIDTH] IN BLOOD BY AUTOMATED COUNT: 16.9 % (ref 10–15)
GFR SERPL CREATININE-BSD FRML MDRD: 68 ML/MIN/{1.73_M2}
GLUCOSE SERPL-MCNC: 201 MG/DL (ref 70–99)
HCT VFR BLD AUTO: 21.1 % (ref 40–53)
HCT VFR BLD AUTO: 24.3 % (ref 40–53)
HGB BLD-MCNC: 6.5 G/DL (ref 13.3–17.7)
HGB BLD-MCNC: 7.3 G/DL (ref 13.3–17.7)
INR PPP: 1.17 (ref 0.86–1.14)
INR PPP: 1.17 (ref 0.86–1.14)
LACTATE BLD-SCNC: 3.8 MMOL/L (ref 0.7–2.1)
LDH SERPL L TO P-CCNC: 292 U/L (ref 85–227)
LYMPHOCYTES # BLD AUTO: 2.1 10E9/L (ref 0.8–5.3)
LYMPHOCYTES NFR BLD AUTO: 4 %
LYMPHOCYTES NFR BLD AUTO: 42 %
MCH RBC QN AUTO: 27.7 PG (ref 26.5–33)
MCH RBC QN AUTO: 28 PG (ref 26.5–33)
MCHC RBC AUTO-ENTMCNC: 30 G/DL (ref 31.5–36.5)
MCHC RBC AUTO-ENTMCNC: 30.8 G/DL (ref 31.5–36.5)
MCV RBC AUTO: 90 FL (ref 78–100)
MCV RBC AUTO: 93 FL (ref 78–100)
METAMYELOCYTES # BLD: 0.2 10E9/L
METAMYELOCYTES NFR BLD MANUAL: 4.5 %
MICROCYTES BLD QL SMEAR: PRESENT
MONOCYTES # BLD AUTO: 0.3 10E9/L (ref 0–1.3)
MONOCYTES NFR BLD AUTO: 0.5 %
MONOCYTES NFR BLD AUTO: 5 %
MYELOCYTES # BLD: 0.1 10E9/L
MYELOCYTES NFR BLD MANUAL: 1 %
MYELOCYTES NFR BLD MANUAL: 2 %
NEUTROPHILS # BLD AUTO: 2.3 10E9/L (ref 1.6–8.3)
NEUTROPHILS NFR BLD AUTO: 45.5 %
NEUTROPHILS NFR BLD AUTO: 81 %
NRBC # BLD AUTO: 0.2 10*3/UL
NRBC BLD AUTO-RTO: 4 /100
OTHER CELLS # BLD MANUAL: 0.1 10E9/L
OTHER CELLS NFR BLD MANUAL: 1 %
PCO2 BLDV: 41 MM HG (ref 40–50)
PH BLDV: 7.31 PH (ref 7.32–7.43)
PHOSPHATE SERPL-MCNC: 5.5 MG/DL (ref 2.5–4.5)
PLATELET # BLD AUTO: 41 10E9/L (ref 150–450)
PLATELET # BLD AUTO: 54 10E9/L (ref 150–450)
PLATELET # BLD EST: ABNORMAL 10*3/UL
PO2 BLDV: 32 MM HG (ref 25–47)
POIKILOCYTOSIS BLD QL SMEAR: ABNORMAL
POLYCHROMASIA BLD QL SMEAR: ABNORMAL
POTASSIUM SERPL-SCNC: 4.8 MMOL/L (ref 3.4–5.3)
PROMYELOCYTES # BLD MANUAL: 0 10E9/L
PROMYELOCYTES NFR BLD MANUAL: 0 %
PROT SERPL-MCNC: 5.6 G/DL (ref 6.8–8.8)
RBC # BLD AUTO: 2.35 10E12/L (ref 4.4–5.9)
RBC # BLD AUTO: 2.61 10E12/L (ref 4.4–5.9)
SAO2 % BLDV FROM PO2: 56 %
SODIUM SERPL-SCNC: 135 MMOL/L (ref 133–144)
TRANSFUSION STATUS PATIENT QL: NORMAL
TROPONIN I SERPL-MCNC: 0.12 UG/L (ref 0–0.04)
TROPONIN I SERPL-MCNC: 0.16 UG/L (ref 0–0.04)
TROPONIN I SERPL-MCNC: 0.19 UG/L (ref 0–0.04)
URATE SERPL-MCNC: 5.2 MG/DL (ref 3.5–7.2)
WBC # BLD AUTO: 10.2 10E9/L (ref 4–11)
WBC # BLD AUTO: 5 10E9/L (ref 4–11)

## 2021-06-25 PROCEDURE — 250N000011 HC RX IP 250 OP 636

## 2021-06-25 PROCEDURE — 120N000005 HC R&B MS OVERFLOW UMMC

## 2021-06-25 PROCEDURE — 250N000011 HC RX IP 250 OP 636: Performed by: STUDENT IN AN ORGANIZED HEALTH CARE EDUCATION/TRAINING PROGRAM

## 2021-06-25 PROCEDURE — 97535 SELF CARE MNGMENT TRAINING: CPT | Mod: GP | Performed by: PHYSICAL THERAPIST

## 2021-06-25 PROCEDURE — 87081 CULTURE SCREEN ONLY: CPT | Performed by: STUDENT IN AN ORGANIZED HEALTH CARE EDUCATION/TRAINING PROGRAM

## 2021-06-25 PROCEDURE — 93308 TTE F-UP OR LMTD: CPT | Mod: 26 | Performed by: INTERNAL MEDICINE

## 2021-06-25 PROCEDURE — 93325 DOPPLER ECHO COLOR FLOW MAPG: CPT | Mod: 26 | Performed by: INTERNAL MEDICINE

## 2021-06-25 PROCEDURE — 93970 EXTREMITY STUDY: CPT | Mod: 26 | Performed by: RADIOLOGY

## 2021-06-25 PROCEDURE — 84550 ASSAY OF BLOOD/URIC ACID: CPT | Performed by: STUDENT IN AN ORGANIZED HEALTH CARE EDUCATION/TRAINING PROGRAM

## 2021-06-25 PROCEDURE — 86900 BLOOD TYPING SEROLOGIC ABO: CPT | Performed by: STUDENT IN AN ORGANIZED HEALTH CARE EDUCATION/TRAINING PROGRAM

## 2021-06-25 PROCEDURE — P9040 RBC LEUKOREDUCED IRRADIATED: HCPCS | Performed by: STUDENT IN AN ORGANIZED HEALTH CARE EDUCATION/TRAINING PROGRAM

## 2021-06-25 PROCEDURE — 99221 1ST HOSP IP/OBS SF/LOW 40: CPT | Mod: 25 | Performed by: INTERNAL MEDICINE

## 2021-06-25 PROCEDURE — 250N000013 HC RX MED GY IP 250 OP 250 PS 637: Performed by: INTERNAL MEDICINE

## 2021-06-25 PROCEDURE — 255N000002 HC RX 255 OP 636: Performed by: INTERNAL MEDICINE

## 2021-06-25 PROCEDURE — 85004 AUTOMATED DIFF WBC COUNT: CPT | Performed by: INTERNAL MEDICINE

## 2021-06-25 PROCEDURE — 250N000009 HC RX 250: Performed by: STUDENT IN AN ORGANIZED HEALTH CARE EDUCATION/TRAINING PROGRAM

## 2021-06-25 PROCEDURE — 86923 COMPATIBILITY TEST ELECTRIC: CPT | Performed by: STUDENT IN AN ORGANIZED HEALTH CARE EDUCATION/TRAINING PROGRAM

## 2021-06-25 PROCEDURE — 84484 ASSAY OF TROPONIN QUANT: CPT | Performed by: STUDENT IN AN ORGANIZED HEALTH CARE EDUCATION/TRAINING PROGRAM

## 2021-06-25 PROCEDURE — 250N000011 HC RX IP 250 OP 636: Performed by: PHYSICIAN ASSISTANT

## 2021-06-25 PROCEDURE — 80053 COMPREHEN METABOLIC PANEL: CPT | Performed by: INTERNAL MEDICINE

## 2021-06-25 PROCEDURE — 83615 LACTATE (LD) (LDH) ENZYME: CPT | Performed by: STUDENT IN AN ORGANIZED HEALTH CARE EDUCATION/TRAINING PROGRAM

## 2021-06-25 PROCEDURE — 93321 DOPPLER ECHO F-UP/LMTD STD: CPT | Mod: 26 | Performed by: INTERNAL MEDICINE

## 2021-06-25 PROCEDURE — 85610 PROTHROMBIN TIME: CPT | Performed by: STUDENT IN AN ORGANIZED HEALTH CARE EDUCATION/TRAINING PROGRAM

## 2021-06-25 PROCEDURE — 258N000003 HC RX IP 258 OP 636

## 2021-06-25 PROCEDURE — 99233 SBSQ HOSP IP/OBS HIGH 50: CPT | Performed by: INTERNAL MEDICINE

## 2021-06-25 PROCEDURE — 97161 PT EVAL LOW COMPLEX 20 MIN: CPT | Mod: GP | Performed by: PHYSICAL THERAPIST

## 2021-06-25 PROCEDURE — 84100 ASSAY OF PHOSPHORUS: CPT | Performed by: STUDENT IN AN ORGANIZED HEALTH CARE EDUCATION/TRAINING PROGRAM

## 2021-06-25 PROCEDURE — 93325 DOPPLER ECHO COLOR FLOW MAPG: CPT

## 2021-06-25 PROCEDURE — 250N000011 HC RX IP 250 OP 636: Performed by: INTERNAL MEDICINE

## 2021-06-25 PROCEDURE — 258N000003 HC RX IP 258 OP 636: Performed by: STUDENT IN AN ORGANIZED HEALTH CARE EDUCATION/TRAINING PROGRAM

## 2021-06-25 PROCEDURE — 93970 EXTREMITY STUDY: CPT

## 2021-06-25 PROCEDURE — 84484 ASSAY OF TROPONIN QUANT: CPT | Performed by: INTERNAL MEDICINE

## 2021-06-25 PROCEDURE — 86901 BLOOD TYPING SEROLOGIC RH(D): CPT | Performed by: STUDENT IN AN ORGANIZED HEALTH CARE EDUCATION/TRAINING PROGRAM

## 2021-06-25 PROCEDURE — 86850 RBC ANTIBODY SCREEN: CPT | Performed by: STUDENT IN AN ORGANIZED HEALTH CARE EDUCATION/TRAINING PROGRAM

## 2021-06-25 PROCEDURE — 85730 THROMBOPLASTIN TIME PARTIAL: CPT | Performed by: STUDENT IN AN ORGANIZED HEALTH CARE EDUCATION/TRAINING PROGRAM

## 2021-06-25 PROCEDURE — 85027 COMPLETE CBC AUTOMATED: CPT | Performed by: STUDENT IN AN ORGANIZED HEALTH CARE EDUCATION/TRAINING PROGRAM

## 2021-06-25 PROCEDURE — 250N000013 HC RX MED GY IP 250 OP 250 PS 637: Performed by: STUDENT IN AN ORGANIZED HEALTH CARE EDUCATION/TRAINING PROGRAM

## 2021-06-25 RX ORDER — ACETAMINOPHEN 325 MG/1
325-650 TABLET ORAL EVERY 4 HOURS PRN
Status: DISCONTINUED | OUTPATIENT
Start: 2021-06-25 | End: 2021-06-28 | Stop reason: HOSPADM

## 2021-06-25 RX ORDER — HEPARIN SODIUM,PORCINE 10 UNIT/ML
5-10 VIAL (ML) INTRAVENOUS
Status: DISCONTINUED | OUTPATIENT
Start: 2021-06-25 | End: 2021-06-28 | Stop reason: HOSPADM

## 2021-06-25 RX ORDER — METHYLPREDNISOLONE SODIUM SUCCINATE 125 MG/2ML
125 INJECTION, POWDER, LYOPHILIZED, FOR SOLUTION INTRAMUSCULAR; INTRAVENOUS
Status: DISCONTINUED | OUTPATIENT
Start: 2021-06-25 | End: 2021-06-27

## 2021-06-25 RX ORDER — PROCHLORPERAZINE MALEATE 5 MG
5 TABLET ORAL EVERY 6 HOURS PRN
Status: DISCONTINUED | OUTPATIENT
Start: 2021-06-25 | End: 2021-06-27

## 2021-06-25 RX ORDER — MEPERIDINE HYDROCHLORIDE 25 MG/ML
25 INJECTION INTRAMUSCULAR; INTRAVENOUS; SUBCUTANEOUS EVERY 30 MIN PRN
Status: DISCONTINUED | OUTPATIENT
Start: 2021-06-25 | End: 2021-06-27

## 2021-06-25 RX ORDER — NALOXONE HYDROCHLORIDE 0.4 MG/ML
0.4 INJECTION, SOLUTION INTRAMUSCULAR; INTRAVENOUS; SUBCUTANEOUS
Status: DISCONTINUED | OUTPATIENT
Start: 2021-06-25 | End: 2021-06-28 | Stop reason: HOSPADM

## 2021-06-25 RX ORDER — LORAZEPAM 0.5 MG/1
.5-1 TABLET ORAL EVERY 4 HOURS PRN
Status: DISCONTINUED | OUTPATIENT
Start: 2021-06-25 | End: 2021-06-27

## 2021-06-25 RX ORDER — NALOXONE HYDROCHLORIDE 0.4 MG/ML
0.2 INJECTION, SOLUTION INTRAMUSCULAR; INTRAVENOUS; SUBCUTANEOUS
Status: DISCONTINUED | OUTPATIENT
Start: 2021-06-25 | End: 2021-06-27

## 2021-06-25 RX ORDER — NALOXONE HYDROCHLORIDE 0.4 MG/ML
0.2 INJECTION, SOLUTION INTRAMUSCULAR; INTRAVENOUS; SUBCUTANEOUS
Status: DISCONTINUED | OUTPATIENT
Start: 2021-06-25 | End: 2021-06-28 | Stop reason: HOSPADM

## 2021-06-25 RX ORDER — FUROSEMIDE 10 MG/ML
20 INJECTION INTRAMUSCULAR; INTRAVENOUS ONCE
Status: COMPLETED | OUTPATIENT
Start: 2021-06-25 | End: 2021-06-25

## 2021-06-25 RX ORDER — ACETAMINOPHEN 325 MG/1
325 TABLET ORAL EVERY 4 HOURS PRN
Status: DISCONTINUED | OUTPATIENT
Start: 2021-06-25 | End: 2021-06-28 | Stop reason: HOSPADM

## 2021-06-25 RX ORDER — ALLOPURINOL 300 MG/1
300 TABLET ORAL DAILY
Status: DISCONTINUED | OUTPATIENT
Start: 2021-06-25 | End: 2021-06-28 | Stop reason: HOSPADM

## 2021-06-25 RX ORDER — HEPARIN SODIUM,PORCINE 10 UNIT/ML
5-10 VIAL (ML) INTRAVENOUS EVERY 24 HOURS
Status: DISCONTINUED | OUTPATIENT
Start: 2021-06-25 | End: 2021-06-28 | Stop reason: HOSPADM

## 2021-06-25 RX ORDER — LORAZEPAM 2 MG/ML
.5-1 INJECTION INTRAMUSCULAR EVERY 4 HOURS PRN
Status: DISCONTINUED | OUTPATIENT
Start: 2021-06-25 | End: 2021-06-27

## 2021-06-25 RX ORDER — EPINEPHRINE 1 MG/ML
0.3 INJECTION, SOLUTION, CONCENTRATE INTRAVENOUS EVERY 5 MIN PRN
Status: DISCONTINUED | OUTPATIENT
Start: 2021-06-25 | End: 2021-06-27

## 2021-06-25 RX ORDER — NALOXONE HYDROCHLORIDE 0.4 MG/ML
0.4 INJECTION, SOLUTION INTRAMUSCULAR; INTRAVENOUS; SUBCUTANEOUS
Status: DISCONTINUED | OUTPATIENT
Start: 2021-06-25 | End: 2021-06-25

## 2021-06-25 RX ORDER — LIDOCAINE 40 MG/G
CREAM TOPICAL
Status: DISCONTINUED | OUTPATIENT
Start: 2021-06-25 | End: 2021-06-27

## 2021-06-25 RX ORDER — OXYCODONE HYDROCHLORIDE 5 MG/1
5 TABLET ORAL EVERY 4 HOURS PRN
Status: DISCONTINUED | OUTPATIENT
Start: 2021-06-25 | End: 2021-06-28 | Stop reason: HOSPADM

## 2021-06-25 RX ORDER — ALBUTEROL SULFATE 0.83 MG/ML
2.5 SOLUTION RESPIRATORY (INHALATION) EVERY 4 HOURS PRN
Status: DISCONTINUED | OUTPATIENT
Start: 2021-06-25 | End: 2021-06-28 | Stop reason: HOSPADM

## 2021-06-25 RX ORDER — ACYCLOVIR 200 MG/1
800 CAPSULE ORAL 2 TIMES DAILY
Status: DISCONTINUED | OUTPATIENT
Start: 2021-06-25 | End: 2021-06-28 | Stop reason: HOSPADM

## 2021-06-25 RX ORDER — HYDROXYZINE HYDROCHLORIDE 25 MG/1
50 TABLET, FILM COATED ORAL 3 TIMES DAILY PRN
Status: DISCONTINUED | OUTPATIENT
Start: 2021-06-25 | End: 2021-06-28 | Stop reason: HOSPADM

## 2021-06-25 RX ORDER — NALOXONE HYDROCHLORIDE 0.4 MG/ML
0.2 INJECTION, SOLUTION INTRAMUSCULAR; INTRAVENOUS; SUBCUTANEOUS
Status: DISCONTINUED | OUTPATIENT
Start: 2021-06-25 | End: 2021-06-25

## 2021-06-25 RX ORDER — HEPARIN SODIUM (PORCINE) LOCK FLUSH IV SOLN 100 UNIT/ML 100 UNIT/ML
5 SOLUTION INTRAVENOUS
Status: DISCONTINUED | OUTPATIENT
Start: 2021-06-25 | End: 2021-06-28 | Stop reason: HOSPADM

## 2021-06-25 RX ORDER — ALBUTEROL SULFATE 5 MG/ML
2.5 SOLUTION RESPIRATORY (INHALATION)
Status: DISCONTINUED | OUTPATIENT
Start: 2021-06-25 | End: 2021-06-27

## 2021-06-25 RX ORDER — FLUTICASONE PROPIONATE 50 MCG
2 SPRAY, SUSPENSION (ML) NASAL DAILY PRN
Status: DISCONTINUED | OUTPATIENT
Start: 2021-06-25 | End: 2021-06-28 | Stop reason: HOSPADM

## 2021-06-25 RX ORDER — TOLTERODINE 4 MG/1
4 CAPSULE, EXTENDED RELEASE ORAL DAILY
Status: DISCONTINUED | OUTPATIENT
Start: 2021-06-25 | End: 2021-06-28 | Stop reason: HOSPADM

## 2021-06-25 RX ORDER — VANCOMYCIN HYDROCHLORIDE 125 MG/1
125 CAPSULE ORAL 4 TIMES DAILY
Status: DISCONTINUED | OUTPATIENT
Start: 2021-06-25 | End: 2021-06-25

## 2021-06-25 RX ORDER — DIPHENHYDRAMINE HYDROCHLORIDE 50 MG/ML
50 INJECTION INTRAMUSCULAR; INTRAVENOUS
Status: DISCONTINUED | OUTPATIENT
Start: 2021-06-25 | End: 2021-06-27

## 2021-06-25 RX ORDER — LOPERAMIDE HCL 2 MG
2 CAPSULE ORAL 4 TIMES DAILY PRN
Status: DISCONTINUED | OUTPATIENT
Start: 2021-06-25 | End: 2021-06-28 | Stop reason: HOSPADM

## 2021-06-25 RX ORDER — LORAZEPAM 2 MG/ML
0.5 INJECTION INTRAMUSCULAR EVERY 4 HOURS PRN
Status: DISCONTINUED | OUTPATIENT
Start: 2021-06-25 | End: 2021-06-28 | Stop reason: HOSPADM

## 2021-06-25 RX ORDER — GRANISETRON HYDROCHLORIDE 1 MG/ML
1 INJECTION INTRAVENOUS ONCE
Status: COMPLETED | OUTPATIENT
Start: 2021-06-25 | End: 2021-06-25

## 2021-06-25 RX ORDER — ALBUTEROL SULFATE 90 UG/1
1-2 AEROSOL, METERED RESPIRATORY (INHALATION)
Status: DISCONTINUED | OUTPATIENT
Start: 2021-06-25 | End: 2021-06-27

## 2021-06-25 RX ORDER — FLUCONAZOLE 100 MG/1
100 TABLET ORAL DAILY
Status: DISCONTINUED | OUTPATIENT
Start: 2021-06-25 | End: 2021-06-28 | Stop reason: HOSPADM

## 2021-06-25 RX ORDER — DIPHENHYDRAMINE HCL 25 MG
25 CAPSULE ORAL EVERY 6 HOURS PRN
Status: DISCONTINUED | OUTPATIENT
Start: 2021-06-25 | End: 2021-06-28 | Stop reason: HOSPADM

## 2021-06-25 RX ORDER — FUROSEMIDE 10 MG/ML
20 INJECTION INTRAMUSCULAR; INTRAVENOUS EVERY 12 HOURS
Status: DISPENSED | OUTPATIENT
Start: 2021-06-25 | End: 2021-06-26

## 2021-06-25 RX ORDER — LORAZEPAM 0.5 MG/1
0.5 TABLET ORAL
Status: DISCONTINUED | OUTPATIENT
Start: 2021-06-25 | End: 2021-06-28 | Stop reason: HOSPADM

## 2021-06-25 RX ORDER — PROCHLORPERAZINE MALEATE 5 MG
5-10 TABLET ORAL EVERY 6 HOURS PRN
Status: DISCONTINUED | OUTPATIENT
Start: 2021-06-25 | End: 2021-06-28 | Stop reason: HOSPADM

## 2021-06-25 RX ADMIN — CYCLOPHOSPHAMIDE 1060 MG: 2 INJECTION, POWDER, FOR SOLUTION INTRAVENOUS; ORAL at 17:38

## 2021-06-25 RX ADMIN — ALLOPURINOL 300 MG: 300 TABLET ORAL at 11:25

## 2021-06-25 RX ADMIN — OMEPRAZOLE 20 MG: 20 CAPSULE, DELAYED RELEASE ORAL at 11:25

## 2021-06-25 RX ADMIN — FUROSEMIDE 20 MG: 10 INJECTION, SOLUTION INTRAVENOUS at 11:26

## 2021-06-25 RX ADMIN — ACYCLOVIR 800 MG: 200 CAPSULE ORAL at 20:37

## 2021-06-25 RX ADMIN — SODIUM CHLORIDE 500 ML: 9 INJECTION, SOLUTION INTRAVENOUS at 15:31

## 2021-06-25 RX ADMIN — Medication 1 CAPSULE: at 12:46

## 2021-06-25 RX ADMIN — SODIUM CHLORIDE: 9 INJECTION, SOLUTION INTRAVENOUS at 00:55

## 2021-06-25 RX ADMIN — Medication 5 ML: at 11:27

## 2021-06-25 RX ADMIN — ACYCLOVIR 800 MG: 200 CAPSULE ORAL at 12:45

## 2021-06-25 RX ADMIN — LORAZEPAM 0.5 MG: 0.5 TABLET ORAL at 20:37

## 2021-06-25 RX ADMIN — FLUDARABINE PHOSPHATE 70 MG: 25 INJECTION, SOLUTION INTRAVENOUS at 16:41

## 2021-06-25 RX ADMIN — ALBUTEROL SULFATE 2.5 MG: 2.5 SOLUTION RESPIRATORY (INHALATION) at 17:04

## 2021-06-25 RX ADMIN — HUMAN ALBUMIN MICROSPHERES AND PERFLUTREN 4 ML: 10; .22 INJECTION, SOLUTION INTRAVENOUS at 12:39

## 2021-06-25 RX ADMIN — DIPHENHYDRAMINE HYDROCHLORIDE 25 MG: 25 CAPSULE ORAL at 11:26

## 2021-06-25 RX ADMIN — FLUCONAZOLE 100 MG: 100 TABLET ORAL at 11:25

## 2021-06-25 RX ADMIN — TOLTERODINE TARTRATE 4 MG: 4 CAPSULE, EXTENDED RELEASE ORAL at 15:31

## 2021-06-25 RX ADMIN — PROCHLORPERAZINE MALEATE 10 MG: 5 TABLET ORAL at 11:42

## 2021-06-25 RX ADMIN — ACETAMINOPHEN 325 MG: 325 TABLET, FILM COATED ORAL at 11:25

## 2021-06-25 RX ADMIN — SODIUM CHLORIDE 500 ML: 9 INJECTION, SOLUTION INTRAVENOUS at 20:37

## 2021-06-25 RX ADMIN — LORAZEPAM 0.5 MG: 0.5 TABLET ORAL at 17:09

## 2021-06-25 RX ADMIN — GRANISETRON HYDROCHLORIDE 1 MG: 1 INJECTION, SOLUTION INTRAVENOUS at 17:06

## 2021-06-25 RX ADMIN — FUROSEMIDE 20 MG: 10 INJECTION, SOLUTION INTRAMUSCULAR; INTRAVENOUS at 17:48

## 2021-06-25 ASSESSMENT — MIFFLIN-ST. JEOR: SCORE: 1966.89

## 2021-06-25 ASSESSMENT — ACTIVITIES OF DAILY LIVING (ADL)
ADLS_ACUITY_SCORE: 10
HEARING_DIFFICULTY_OR_DEAF: NO
WALKING_OR_CLIMBING_STAIRS_DIFFICULTY: NO
ADLS_ACUITY_SCORE: 10
DIFFICULTY_COMMUNICATING: NO
ADLS_ACUITY_SCORE: 10
CONCENTRATING,_REMEMBERING_OR_MAKING_DECISIONS_DIFFICULTY: NO
TOILETING_ISSUES: NO
DOING_ERRANDS_INDEPENDENTLY_DIFFICULTY: NO
FALL_HISTORY_WITHIN_LAST_SIX_MONTHS: NO
DIFFICULTY_EATING/SWALLOWING: NO
ADLS_ACUITY_SCORE: 10
WEAR_GLASSES_OR_BLIND: NO
DRESSING/BATHING_DIFFICULTY: NO

## 2021-06-25 NOTE — PROGRESS NOTES
Stop time on MAR & chart I & O  Chemo drug: fludarabine and Cytoxan   Tolerated: I > O otherwise no issues, some anxiety and increased SOB  Intervention: scheduled boluses and lasix 20mg IV, neb PRN  Response: Calmer, improved breathing and watching I > O  Plan: continue to monitor.  Watch I & O

## 2021-06-25 NOTE — PLAN OF CARE
AVSS.  Telemetry stopped per MDs.  No pain.  Nausea compazine PRN.  Stooling several time in enteric for recent c.diff.  SOB, SAMS, fine crackles and wheezes on and off today.  Neb x1.  Lasix x2.  Echo, cards consult, BLLE US.  PT and lymphedema ordered.  Comperm on.  Troponin 0.188 and 0.157.  Last troponin due at 2100.  PRBC x1.  Continue to monitor.        Problem: Adult Inpatient Plan of Care  Goal: Plan of Care Review  Outcome: No Change  Flowsheets (Taken 6/25/2021 1850)  Plan of Care Reviewed With:    patient    spouse

## 2021-06-25 NOTE — CONSULTS
Assessment completed on 6/9/21 in BMT clinic, please see information below for inpatient review.    Blood and Marrow Transplant   Psychosocial Assessment with   Clinical      Assessment completed on 6/9/21 via phone as part of the COVID 19 protocol. Assessment of living situation, support system, financial status, functional status, coping, stressors, need for resources and social work intervention provided as needed. Information for this assessment was provided by pt's report in addition to medical chart review and consultation with medical team.      Present at Assessment:   Patient: Juvenal Blake  : CITLALLI Cunningham LGSW     Diagnosis: NHL      Date of Diagnosis: 2019     Cellular therapy type: FATE 516     Physician: Rosa Terry MD     Nurse Coordinator: Arlen Lui RN     Social Workers: CITLALLI Cunningham LGSW     Permanent/Local Address:   [address] [place] [address]     Living Situation: Pt lives in [place], [place] w/ his wife Nancy.     Local Address: Pt lives in [place], [place] which is within the required distance of the hospital. Pt does not need to relocate.      Contact Information:  Cell Phone: [phone]  Home Phone: [phone]  Pt Email: [email or web address]  Wife's Cell Phone: [phone]     Presenting Information:  Juvenal Blake is a 55 year old male diagnosed with NHL who presents for evaluation for a FATE 516 cellular infusion at the [facility] ([facility] Pt presented alone to today's visit.      Decision Making: Self     Health Care Directive:  Yes. Pt has a health care directive and will bring it when they return to the BMT program.      Relationship Status: . Pt described relationship as stable/supportive.      Special Needs:   Pt endorsed he would like his BMT team to be aware that his wife was recently found to have breast cancer. She underwent a lumpectomy and is anticipated to have another lumpectomy in  the near future with plan for 6 weeks of radiation M-F. They endorse additional family support as needed (2 sons local). He states his wife's second lumpectomy is being re-scheduled to accommodate his treatment needs. He appreciates assistance in schedule communication so they can coordinate both of their needs.      Family/Support System: Pt endorsed a strong/stable support system including family and close friends who will be available to support pt throughout transplant process.      Family Information:   Spouse: Nancy Blake  Children: 2 biological sons (Juvenal the 2nd & Gwyn) and 2 step sons   Parents:   Siblings:  3 siblings- 1 brother (lives out of state) and 2 sisters     Caregiver: SW discussed with pt the caregiver role and expectation at length. Pt is agreeable to having a full time caregiver for a minimum of 30 days until cleared by the BMT physician. Pt confirmed understanding of the caregiver requirement. Pt's primary caregiver will be his wife Nancy with his two local sons as a secondary or back-up to assist as needed. Caregiver education and resources provided. No caregiver concerns identified.      Caregiver Contact Information:  Nancy Blake (wife) - Cell Phone: [phone]  Juvenal Blake (son) - Cell Phone: [phone]  Gwyn Blake (son) - Cell Phone: [phone]     Transportation Mode: Personal Vehicle. Pt is aware of driving restrictions post-BMT and the need for the caregiver is to drive until cleared to drive by the BMT physician. SW provided information on parking info and monthly parking pass options.      Insurance: Pt has [employer] health insurance. Pt denied specific insurance concerns at this time. SW reiterated information about the [employer]  should specific insurance questions arise as pt moves through transplant process. Future Insurance questions referred to [facility] [employer] -Yesenia Olivas - Ph# [phone].     Sources of  "Income: Pt and spouse are supported by spouse's employment income, pension, and SSDI. Pt denied anticipation of financial hardship related to BMT at this time. SW provided information on ryan options and encouraged pt to contact this  for support should financial situation change. Pt did state \"if things go sour for me, I'd like assistance looking at aid for my brother flying in out of state if possible.\" CSW informed Pt to reach out to CSW in the event of this need and ryan options will be reviewed.     Employment:   Currently employed: No; Pt has not worked since last year due to treatment and side effects. Pt states he is currently receiving SSDI and his pension.  Employer: [employer]  Occupation:      Spouse/Partner Employed: Yes - has summers off; last day of school 6/11/21.  Employer: [employer]  Occupation: Teacher     Mental Health: Pt reports a hx of anxiety. Pt states he has not utilized medications and does not feel the need for that at this time. SW explained that it's not uncommon for patients going through transplant to experience symptoms of depression/anxiety. Pt believes he would be able to identify symptoms of his depression/anxiety throughout the BMT process.      Juvenal identified \"feeling all of the emotions\" related to his recent relapse and states he was not provided a \"good\" prognosis. He processed his mortality and what is important to him. He has been working on gathering his affairs in order and worrying about his wife. He states he doesn't \"like to think about it too much and keep moving forward.\"      PHQ-9:  Pt scored a 8 which indicates \"mild\" on the depression severity scale. Pt endorses this is an accurate reflection of his emotional state.     GAD7:  Pt scored a 3 which indicates no sign of anxiety on the Generalized Anxiety Disorder Questionnaire. Pt endorses that he is currently feeling anxiety daily; though states he feels that he is coping " "appropriately.      Chemical Use:   Chemical use identified. Juvenal notes that he drinks alcohol daily- (3-4 mixed drinks) and uses marijuana daily. Juvenal notes that the marijuana use is to help with his nausea. Discussed the need to abstain from these during the BMT process. Pt states he was not informed to abstain during his last treatment and had not been informed at this time. CSW informed Pt that Dr. Terry will be notified to discuss with him further (notified 6/9/21).      Trauma/Loss/Abuse History: Multiple losses associated with cancer diagnosis and treatment, including health, employment, changes to physical appearance, etc. Pt also discussed grief related to his wife's diagnosis of breast cancer.      Spirituality: Patient does not identify with martha community.  Wife is Zoroastrian and at times attend with her, but was raised Sabianist.      Coping: Pt noted that he is currently feeling \"feeling all of the emotions\" related to his recent relapse and states he was not provided a \"good\" prognosis. Pt continues to be hopeful that the FATE 516 will be an effective treatment for him. He states he made an appointment w/ [facility] (7/1/21) for a second opinion (if needed). Pt described feeling that he needs to \"explore all of [his] options.\" Pt shared that currently his main coping mechanism is \"not dwelling too long\" on his current feelings, getting his affairs in order, and talking with his wife. Pt also shared that he enjoys gardening, riding his motorcycle, going on walks, watching movies, and working on a scanning/uploading photos project.SW and pt discussed additional positive coping mechanisms that pt can utilize while in the hospital. While hospitalized, pt plans to watch movies.      Caregiver Coping: Not assessed during this process due to Nancy not being available.     Education Provided: Transplant process expectations, Caregiver requirements, Caregiver self-care, Financial issues related to " transplant, Financial resources/grants available, Common psychosocial stressors pre/post transplant, Support group(s) available, Hospital resources available, Web site information, Social Work role and Resources for children/siblings     Interventions Provided: Psychosocial Support and Education      Assessment and Recommendations for Team:  Pt is a 55 year old male diagnosed with NHL who is here undergoing preparation for a planned FATE 516 cellular infusion.      Pt is pleasant, calm and able to articulate concerns/coping mechanisms in an appropriate manner. During our meeting pt was alert, he was interactive, affect was full, he displayed appropriate memory and thought processes. Pt feels comfortable communicating with the medical team. Pt has a strong supportive network of family and friends who are involved.  Pt and pt's wife will benefit from ongoing psychosocial support in regards to coping with the adjustment to the BMT process as well as other psychosocial stressors including his wife's own treatment needs related to breast cancer.     Pt has a stable support system and a confirmed caregiver plan. Pt verbalizes understanding of the cellular therapy process and wanting to proceed. SW provided contact information and encouraged pt to contact SW with questions, concerns, resources and for support.     CSW notified Dr. Terry 6/9/21 regarding Pt's current chemical use to review and discuss further.      Important Information:   -Pt endorses current chemical use; BMT MD notified 6/9/21.      Follow up Planned:   Psychosocial support     CITLALLI Cunningham, EMIL  Adult Blood & Marrow Transplant   Phone: [phone]  Pager: [phone]

## 2021-06-25 NOTE — H&P
Heme/Onc History and Physical  Department of Internal Medicine    Patient Name: Juvenal Blake MRN# [id number]   Age: 55 year old YOB: 1965     Date of Admission:6/24/2021    Primary care provider: Cole Sandoval  Date of Service: 6/24/2021  Admitting Team: manav Somers         Assessment and Plan:   Juvenal Blake is a 55 year old male PMH significant for NHL s/p  Bendamustine + Rituxan then Bendamustine +  Rituxan once again the R-CHOP followed by Nam-NK cell infusion. He is enrolled in protocol [id number], which utilizes Cy/Flu as a lymphodepletion regimen as bridge to Yescarta now admitted from clinic for rituxan  infusion rxn     1.  Rituxan Infusion rxn , Resolving  Pt had Rituxan infusion on 6/24 as outpatient where he started to have rigors, fever, chest tightness with SOB, and tachycardia; Did not have such response to past rituxan treatments (he is on maintenance rituxan since 2012) Was treated with 2 doses of epinephrine, albuterol inhalation, methylprednisone. Pt was on 3 to 6L of O2 with SpO2 of 94%; since ED admission, by 7 PM on 6/24 pt is hemodynamically stable with O2 at 97% on room air, pulse rate ~90 and Blood pressure around 120s/70s; pt continues to have expiratory wheeze with some tachypnea.  - Albuterol nebulization PRN   - Aviod rixutan overnight   - Monitor vitals Q4H  - SpO2 goal of 95+  - Stop fluid bolus due to hypervolemic status     2.  Lymphoma:  - He had 6 cycles of bendamustine and Rituxan completed in October, 2010. He had 2 years of maintenance Rituxan therapy finishing in September, 2012.  - Second course of bendamustine plus rituximab started February 20, 2017. Cycle 6 was completed on July 11, 2017. Then had maintenance rituximab through January, 2019.  - O-CHOP started at second relapse. Cycle 1 given April 23, 2019. Cycle 6 given August 16, 2019.  Haplo NAM-NK therapy at [facility] September 1, 2020  - Iidelalisib started October 21, 2020 [facility]. 150 mg  twice daily.  stopped Idelalisib - last dose was on 6/10 at 8am.   - Eligible to proceed with therapy with retreatment ; flu/cy 6/23; flu/cy/rituxan 6/24; flu/cy 6/25.  - on allopurinol. I don't see a uric acid--ordered for tomorrow was 3.8 on 6/15.      3.  HEME:  WBC normal; Hb at 7.3 chronic normocytic normochromic pattern; platelet at 41  - Hgb goal >7     4.  :   Initial radical prostatectomy and bilateral lymph node dissection. November 15, 2017. Prostatic adenocarcinoma [place] 4+3 = 7 with tertiary pattern 5. Tumor involves 45% of total surface area. Positive for extensive extraprostatic extension. Positive for bilateral seminal vesicle invasion. Multiple margins positive. Lymphovascular invasion not identified. Perineural invasion was noted. Lymph nodes negative. 3 were examined (2 right obturator and 1 left obturator).    Completed radiation to the prostate fossa and pelvic lymph nodes at [facility] early May 2018. He received 4500 cGy in 25 fractions. He then had 2340 cGy boost in 13 fractions to the prostatic fossa, for a total dose of 6240 cGy in 38 treatment fractions delivered over 54 days. He did not receive hormonal therapy:       5.  ID:    C diff colitis: finished vancomycin 6/23 (treated for 7 days).   COVID negative. RVP + for Rhinovirus 6/11; asymptomatic.  - not neutropenic and not on prophy.      6.  FEN/Renal:   Creatinine slightly elevated at 1.52 on 6/24 compared to 1.40 on 6/23; electrolytes stable except Cl at 117. Pt is at CKD 3.  - Monitor Cr/lytes.  - Volume Overload: consider lasix  in am once BP improves      7. Abdominal Pain secondary to cancer.  Also now seeing a rise in LFTs, which suspect to be related.   Has oxycodone 15 tabs and lorazepam 15 tabs for difficulty sleeping. Aware not to take both together due to sedation    CODE:  Full  Diet/IVF:Regukar diet   DVT ppx: SCD  Disposition/Admission Status: Anticipate 1-2 days, may be here more than 2  midnights    Vincent Verdugo MD  Hospitalist/nocturnist  [facility]    Departments of Medicine   Pager: [phone]               Chief Complaint:     Sent from clinic for infusion  rxn        HPI:   Juvenal Blake is a 55 year old male PMH significant for NHL s/p  Bendamustine + Rituxan then Bendamustine +  Rituxan once again the R-CHOP followed by Nam-NK cell infusion. He is enrolled in protocol [id number], which utilizes Cy/Flu as a lymphodepletion regimen as bridge to Yescarta now admitted from clinic for rituxan  infusion rxn     Pt Rituxan infusion on 6/24 as outpatient where he started to have rigors, fever, chest tightness with SOB, and tachycardia; he did not lose consciousness or had hives or palpitation; Did not have such response to past rituxan treatments (he is on maintenance rituxan since 2012) although he notes having mild SOB and chills during each treatment; He was immediately treated at the site and at the [facility] ED and has since been slowly feeling better; He did not have any new complaints before he started the infusion this morning.   . He recently (6/23) completed a course of vancomycine for C diff infection.   ED course  Patient initially was with tachypneic and tachycardic, hypotensive received 1 L bolus albuterol neb and 125 mg methylprednisolone.  Now improving hemodynamics patient admitted for further observation          Past Medical History:     Past Medical History:   Diagnosis Date     Cancer (H)      Non Hodgkin's lymphoma (H)      Shortness of breath               Past Surgical History:     Past Surgical History:   Procedure Laterality Date     HERNIA REPAIR, UMBILICAL       IR CHEST PORT PLACEMENT > 5 YRS OF AGE  2/17/2017     IR LYMPH NODE BIOPSY  10/1/2020     IR LYMPH NODE BIOPSY  5/28/2021              Social History:     Social History     Socioeconomic History     Marital status:      Spouse name: Not on file     Number of children:  Not on file     Years of education: Not on file     Highest education level: Not on file   Occupational History     Not on file   Social Needs     Financial resource strain: Not on file     Food insecurity     Worry: Not on file     Inability: Not on file     Transportation needs     Medical: Not on file     Non-medical: Not on file   Tobacco Use     Smoking status: Former Smoker     Quit date: 1995     Years since quittin.0     Smokeless tobacco: Never Used   Substance and Sexual Activity     Alcohol use: Yes     Frequency: 4 or more times a week     Drinks per session: 3 or 4     Drug use: Yes     Types: Marijuana     Sexual activity: Not on file   Lifestyle     Physical activity     Days per week: Not on file     Minutes per session: Not on file     Stress: Not on file   Relationships     Social connections     Talks on phone: Not on file     Gets together: Not on file     Attends Yazidi service: Not on file     Active member of club or organization: Not on file     Attends meetings of clubs or organizations: Not on file     Relationship status: Not on file     Intimate partner violence     Fear of current or ex partner: Not on file     Emotionally abused: Not on file     Physically abused: Not on file     Forced sexual activity: Not on file   Other Topics Concern     Parent/sibling w/ CABG, MI or angioplasty before 65F 55M? Not Asked   Social History Narrative     Not on file            Family History:     Family History   Problem Relation Age of Onset     Colon Cancer Mother      Lymphoma Father      No Known Problems Brother      No Known Problems Sister      No Known Problems Son      No Known Problems Sister      No Known Problems Son             Immunizations:     Immunization History   Administered Date(s) Administered     COVID-19,PF,Pfizer 2021     FLU 6-35 months 2011, 2016     Flu, Unspecified 10/10/2016, 10/08/2017, 2019     Influenza (H1N1) 2009      Influenza (IIV3) PF 10/13/2009, 09/16/2012, 10/19/2013     Influenza Vaccine IM > 6 months Valent IIV4 10/06/2018, 10/13/2019     Influenza,INJ,MDCK,PF,Quad >4yrs 10/12/2020     Pneumo Conj 13-V (2010&after) 11/18/2019     Pneumococcal 23 valent 05/08/2019     Td (Adult), Adsorbed 01/07/2003     Zoster vaccine recombinant adjuvanted (SHINGRIX) 04/28/2018, 06/30/2018              Allergies:      Allergies   Allergen Reactions     Ondansetron Itching            Medications:     [COMPLETED] 0.9% sodium chloride BOLUS  [COMPLETED] 0.9% sodium chloride BOLUS  [COMPLETED] acetaminophen (TYLENOL) tablet 650 mg  [COMPLETED] albuterol (PROAIR HFA/PROVENTIL HFA/VENTOLIN HFA) 108 (90 Base) MCG/ACT inhaler 1-2 puff  [COMPLETED] cyclophosphamide (CYTOXAN) 1,000 mg in sodium chloride 0.9 % 600 mL infusion  [COMPLETED] diphenhydrAMINE (BENADRYL) capsule 50 mg  [COMPLETED] EPINEPHrine (ADRENALIN) kit 0.3 mg  [COMPLETED] FLUdarabine (FLUDARA) 56 mg in sodium chloride 0.9 % 112 mL infusion  [COMPLETED] furosemide (LASIX) injection 20 mg  [COMPLETED] granisetron (KYTRIL) injection 1 mg  [COMPLETED] meperidine (DEMEROL) injection 12.5 mg  [COMPLETED] riTUXimab-abbs (TRUXIMA) 900 mg in sodium chloride 0.9 % 900 mL infusion    allopurinol (ZYLOPRIM) 300 MG tablet, Take 1 tablet (300 mg) by mouth daily  azelastine (ASTELIN) 0.1 % nasal spray, USE 1 SPRAY IN EACH NOSTRIL TWICE DAILY AS DIRECTED  fluticasone (FLONASE) 50 MCG/ACT nasal spray, SHAKE LIQUID AND USE 2 SPRAYS IN EACH NOSTRIL EVERY DAY  hydrOXYzine (ATARAX) 50 MG tablet, Take 1 tablet (50 mg) by mouth 3 times daily as needed for itching  loperamide (IMODIUM A-D) 2 MG tablet, Take 1 tablet (2 mg) by mouth 4 times daily as needed for diarrhea  LORazepam (ATIVAN) 0.5 MG tablet, Take 1 tablet (0.5 mg) by mouth nightly as needed for anxiety or sleep  medical cannabis (Patient's own supply),   omeprazole (PRILOSEC) 20 MG DR capsule, Take 1 capsule (20 mg) by mouth daily  oxyCODONE  (OXY-IR) 5 MG capsule, Take 1 capsule (5 mg) by mouth every 12 hours as needed for severe pain  prochlorperazine (COMPAZINE) 5 MG tablet, Take 1 tablet (5 mg) by mouth every 6 hours as needed for nausea or vomiting  Psyllium (METAMUCIL FIBER) 51.7 % PACK, Take 1 packet by mouth daily   tolterodine ER (DETROL LA) 4 MG 24 hr capsule, Take 4 mg by mouth  vancomycin (VANCOCIN) 125 MG capsule, Take 1 capsule (125 mg) by mouth 4 times daily             Review of Systems:     A complete ROS was performed and is negative other than what is stated in the HPI.          Physical Exam:   Blood pressure 117/72, pulse 91, temperature 98.2  F (36.8  C), temperature source Oral, resp. rate 27, SpO2 95 %.  General Appearance: Pleasant not in distress   HEENT: No jaundice, moist BM   Respiratory: expiratory wheezing , bibasilar crackles   Cardiovascular: RRR, s1, s2 no m/g   GI: Soft,non tender   Genitourinary: No CVAT   Extremities : +2 pedal edema   Neurologic: A&Ox3, moves all extremities equally               Data:   Reviewed in Epic

## 2021-06-25 NOTE — PLAN OF CARE
/74 (BP Location: Right arm, Cuff Size: Adult Regular)   Pulse 78   Temp 97.6  F (36.4  C) (Axillary)   Resp 18   SpO2 93%   Pt's new admit from the ED that come to around 2200 due to possible reaction to Rixtuxan given at infusing center. Pt AVSS, no c/o pain, maintaining sat's in the mid 90's while on RA. Pt does have SAMS and SOB but per pt it's WNL. Pt come to the unit on 2L NC with report stated pt sat's in the mid 80's while on RA. Pt is on tele in SR with H in the 70's. Pt is up with SBA, old BMBx  site very reddish purple with no drainage. Pt also have petechia in the upper back and very distended abd with pinkish red area in the lower abd. Pt currently have MIVF infusing at 75ml/h with good urine output. Pt is c/o nausea but no orders in place yet. Pt also does not have any orders for Am meds. MD(#[id number]) aware of pt's condition. Am lab come back with Hgb 6.5, MD again was notified but no orders in place yet. Other lab stable with Plt 54, INR 1.17 and WBC 10.2.  Double skin check done with VALERIE HOUSTON and pt's admission paper work need to  finished this morning. Pt was not interested in answering question during the night. Keep monitoring pt as ordered and notify MD with any new changes  Problem: Adult Inpatient Plan of Care  Goal: Plan of Care Review  Outcome: No Change  Flowsheets (Taken 6/25/2021 0702)  Plan of Care Reviewed With: patient  Progress: no change  Goal: Patient-Specific Goal (Individualized)  Outcome: No Change  Goal: Absence of Hospital-Acquired Illness or Injury  Outcome: No Change  Intervention: Identify and Manage Fall Risk  Recent Flowsheet Documentation  Taken 6/25/2021 0044 by Lillian Lomeli RN  Safety Promotion/Fall Prevention: clutter free environment maintained  Taken 6/24/2021 2231 by Lillian Lomeli RN  Safety Promotion/Fall Prevention: clutter free environment maintained  Intervention: Prevent Infection  Recent Flowsheet Documentation  Taken 6/25/2021 0044 by  Lillian Lomeli RN  Infection Prevention:   environmental surveillance performed   equipment surfaces disinfected  Taken 6/24/2021 2231 by Lillian Lomeli RN  Infection Prevention:   environmental surveillance performed   equipment surfaces disinfected  Goal: Optimal Comfort and Wellbeing  Outcome: No Change  Goal: Readiness for Transition of Care  Outcome: No Change  Intervention: Mutually Develop Transition Plan  Recent Flowsheet Documentation  Taken 6/25/2021 0500 by Lillian Lomeli RN  Equipment Currently Used at Home: none     Problem: Adult Inpatient Plan of Care  Goal: Optimal Comfort and Wellbeing  Outcome: No Change     Problem: Adult Inpatient Plan of Care  Goal: Readiness for Transition of Care  Outcome: No Change  Intervention: Mutually Develop Transition Plan  Recent Flowsheet Documentation  Taken 6/25/2021 0500 by Lillian Lomeli RN  Equipment Currently Used at Home: none     Problem: Adult Inpatient Plan of Care  Goal: Readiness for Transition of Care  Intervention: Mutually Develop Transition Plan  Recent Flowsheet Documentation  Taken 6/25/2021 0500 by Lillian Lomeli RN  Equipment Currently Used at Home: none

## 2021-06-25 NOTE — PROGRESS NOTES
BMT Daily Progress  Note   June 25, 2021    Juvenal Blake is a 55 year old male PMH significant for NHL s/p  Bendamustine + Rituxan then Bendamustine +  Rituxan once again the R-CHOP followed by Nam-NK cell infusion. He is enrolled in protocol [id number], which utilizes Cy/Flu as a lymphodepletion regimen as bridge to Yescarta now admitted from clinic for rituxan  infusion rxn     HPI:  Juvenal was admitted yesterday through the ED. He notes significantly improved if not resolved shortness of breath, no further chest heaviness, chills, rigors or difficulty breathing.   Still with fullness in neck and abdomen, stable to his baseline. No increased in size of known lymphoma on neck or groin. Urinating normally. Off oral vancomycin without return of watery diarrhea. No crampy belly pain. Stools semi formed. No n/v. Edema stable.     ROS:  10 point ROS negative except as above.     Physical Exam:   BP (!) 133/122   Pulse 87   Temp 97.6  F (36.4  C)   Resp 20   Wt 115.9 kg (255 lb 9.6 oz)   SpO2 96%   BMI 38.30 kg/m    General: Conversant. KPS 70 +jaundiced and pale appearing  Eyes: JOE   Lungs: CTA bilaterally, decreased breath sounds bilaterally, faint expiratory wheeze. +shallow breathing due to abdomen  Cardiovascular: RRR, no M/R/G   Abdominal/Rectal: ++protuberant, ++firm to palpation  Lymphatics: 2+ pitting edema bilateral shins  Skin: no rashes or petechaie;  jaundiced appearance to face  Neuro: A&O   Additional Findings: Jacobs site NT, no drainage.    Pre-treatment ICANS: 10/10.  Patient has writing log with him      Labs:  Lab Results   Component Value Date    WBC 10.2 06/25/2021    ANEU 2.6 06/24/2021    HGB 6.5 (LL) 06/25/2021    HCT 21.1 (L) 06/25/2021    PLT 54 (L) 06/25/2021     06/25/2021    POTASSIUM 4.8 06/25/2021    CHLORIDE 105 06/25/2021    CO2 24 06/25/2021     (H) 06/25/2021    BUN 26 06/25/2021    CR 1.18 06/25/2021    MAG 2.1 06/23/2021    INR 1.17 (H) 06/25/2021    AST 72 (H)  06/25/2021    ALT 90 (H) 06/25/2021       Assessment and Plan:   Juvenal Blake is a 55 year old male PMH significant for NHL s/p  Bendamustine + Rituxan then Bendamustine +  Rituxan once again the R-CHOP followed by Nam-NK cell infusion. He is enrolled in protocol [id number], which utilizes Cy/Flu as a lymphodepletion regimen as bridge to Yescarta now admitted from clinic for rituxan  infusion rxn      * Rituxan Infusion rxn, Resolved  Pt had Rituxan infusion on 6/24 as outpatient where he started to have rigors, fever, chest tightness with SOB, and tachycardia; Did not have such response to past rituxan treatments (he is on maintenance rituxan since 2012) Was treated with 2 doses of epinephrine, albuterol inhalation, methylprednisone. Pt was on 3 to 6L of O2 with SpO2 of 94%; since ED admission, by 7 PM on 6/24 pt is hemodynamically stable with O2 at 97% on room air, pulse rate ~90 and Blood pressure around 120s/70s; pt continues to have expiratory wheeze with tachypnea. Albuterol nebulization PRN. Not requiring oxygen.    1.  Lymphoma:  - He had 6 cycles of bendamustine and Rituxan completed in October, 2010. He had 2 years of maintenance Rituxan therapy finishing in September, 2012.  - Second course of bendamustine plus rituximab started February 20, 2017. Cycle 6 was completed on July 11, 2017. Then had maintenance rituximab through January, 2019.  - O-CHOP started at second relapse. Cycle 1 given April 23, 2019. Cycle 6 given August 16, 2019.  Haplo NAM-NK therapy at [facility] September 1, 2020  - Iidelalisib started October 21, 2020 at [facility]. 150 mg twice daily.  stopped Idelalisib - last dose was on 6/10 at 8am.   - ; flu/cy 6/23; flu/cy/rituxan 6/24. Pending determination of status on study, will hold flu/cy 6/25.  - On allopurinol. See FEN/Renal below    2.  HEME: Keep hgb >7, plt<10,000  - Needs 1UPRBC today     3.  :   Initial radical prostatectomy and bilateral lymph node dissection.  November 15, 2017. Prostatic adenocarcinoma [place] 4+3 = 7 with tertiary pattern 5. Tumor involves 45% of total surface area. Positive for extensive extraprostatic extension. Positive for bilateral seminal vesicle invasion. Multiple margins positive. Lymphovascular invasion not identified. Perineural invasion was noted. Lymph nodes negative. 3 were examined (2 right obturator and 1 left obturator).    Completed radiation to the prostate fossa and pelvic lymph nodes at [facility] early May 2018. He received 4500 cGy in 25 fractions. He then had 2340 cGy boost in 13 fractions to the prostatic fossa, for a total dose of 6240 cGy in 38 treatment fractions delivered over 54 days. He did not receive hormonal therapy:      4.  ID: T-max 100.0. Start prophy ACV/fluc; cefepime if spikes (not neutropenic)   C diff colitis: finished vancomycin 6/23, treated for 7 days  COVID negative. RVP + for Rhinovirus 6/11; asymptomatic.    5.  FEN/Renal: Monitor Cr/lytes  - Volume Overload: 20mg IV lasix. Re-dose x2 more doses for I>O more than 1L up  - ALEJANDRO secondary to disease likely. Maybe diuresis will actually help congestion. Cr improved 1.1  - Monitor TLS: uric acid 5.2 Phos elevated to 5.5, K and Cr wnl. Continue allopurinol  Lymphedema consulted    6. Abdominal Pain secondary to cancer. Also now seeing a rise in LFTs, which suspect to be related, stable on admit  Has oxycodone 15 tabs and lorazepam 15 tabs for difficulty sleeping. Aware not to take both together due to sedation    7. CV: troponin leak, consult cards and recheck 1400. Repeat ECHO in setting of fluid overload  Cards consulted, appreciate their recs    Final Plan: Obtain final decision from study regarding status to proceed.  Pending ECHO and repeat troponin to ensure he still meets study parameters.    Narcisa Stewart PAC  [phone]    Patient has been seen and evaluated by me. I have reviewed today's vital signs, medications, labs and imaging results  "independently. I have discussed the plan with the team and agree with the findings and plan in this note.      55 years old male with relapsed refractory NHL, planned for , with lymphodepleting chemo: flu/cy 6/23; flu/cy/rituxan 6/24; flu/cy 6/25.    Yesterday, he had a severe reaction to Rituxan, taken to the ED and then admitted (2 doses of epi, albuterol nebs, O2)    Also noted to have EKG changes and a troponin leak- appreciate cardiology input: Likely Type II MI in setting of demand ischemia from tachycardia, acute on chronic anemia, acute on chronic illness. TTE without wall motion abnormality. Would be appropriate to undergo further workup for non-obstructive CAD as an outpatient       He is now feeling better.     On exam:  Blood pressure 123/78, pulse 71, temperature 97.6  F (36.4  C), resp. rate 20, height 1.74 m (5' 8.5\"), weight 115.9 kg (255 lb 9.6 oz), SpO2 96 %.    HEENT: PERRL, EOMI, MMM  Chest: CTAB  CVS: S1 S2 RRR, no murmurs or gallops  PA: soft, non tended  CNS: non focal    55 years old with relapsed, refractory NHL, planned for   after completing lymphodepleting chemo. He will complete Cy/ flu today.  We will trend troponins to peak.    Inga Selby MD          "

## 2021-06-25 NOTE — TELEPHONE ENCOUNTER
EKGs reviewed by Nancy Villar NP. Per her report:    ECG reviewed and is stable from prior ECGs     Routed encounter back to Dr Zavala for review.

## 2021-06-25 NOTE — CONSULTS
Cardiology Consult  Date of Service: 06/25/21    ASSESSMENT:   # Elevated Troponin, Type II MI    Patient describes bilateral lateral chest discomfort with associated shortness of breath while ambulating in his room. Troponin elevated to 0.188. EKG with some ST segment depression that is non-specific & similar to prior. TTE this admission without wall motion abnormality. Likely Type II MI in setting of demand ischemia from tachycardia, acute on chronic anemia, acute on chronic illness. No evidence of heart failure at this time. On CT, no overt coronary calcifications & MPI (lexiscan) 8/2020 showed no ischemia or infarction - both are reassuring against obstructive CAD. Of note, on physical exam, patient does have slight asymmetry in LE edema (RLE > LLE). If patient were to have DVT leading to PE, it is possible that this could also be contributing to cardiac stress & elevated troponin. Of note, there is no evidence of right heart strain on TTE, again reassuring. Would be appropriate to undergo further workup for non-obstructive CAD as an outpatient once he is more stable.     #Anemia - Hgb 6.5  #Thrombocytopenia - 41-58    #NHL - 6 cycles of Bendamustine + Rituximab in 8/2010. 2 year maintance with Rituximab which finished in 9/2012. Second course of bendamustine plus rituximab started February 20, 2017. Cycle 6 was completed on July 11, 2017. Then had maintenance rituximab through January, 2019. O-CHOP started at second relapse. Cycle 1 given April 23, 2019. Cycle 6 given August 16, 2019. Haplo NAM-NK therapy at [facility] September 1, 2020. Iidelalisib started October 21, 2020 at [facility]. 150 mg twice daily.  stopped Idelalisib - last dose was on 6/10. ; flu/cy 6/23; flu/cy/rituxan 6/24; flu/cy 6/25.    #H/o radical prostectomy and bilateral lymph node dissection 11/2017 - Maricel 4+3 = 7. Extensive extra-prosthetic extension. Positive for bilateral seminal vesicle invasion. Multiple margins positive.  "Lymphovascular not noted. Perineural invasion note noted. Lymph node negative.    Radiation: Completed radiation to the prostate fossa and pelvic lymph nodes at [facility] early May 2018. He received 4500 cGy in 25 fractions. He then had 2340 cGy boost in 13 fractions to the prostatic fossa, for a total dose of 6240 cGy in 38 treatment fractions delivered over 54 days. He did not receive hormonal therapy    Home medications: No cardiac medication. Allopurinol, Lorazepam, medical cannabis, compazine, tolterodine, fluticasone, hydroxyzine, loperamide, and oxycodone.    RECOMMEND:  - Trend troponin to peak   - Would evaluate LE with ultrasound to rule out DVT    - Would check lipids for cardiac risk stratification   - Non-urgent outpatient workup for non-obstructive CAD with coronary CT, can be done within 1 month of hospital discharge (ordered for you)  - Cardiology follow up within 1 month (ordered for you)  - Cardiology to sign off     Discussed with Dr. Dumont.     Thank you for allowing our team to be involved in the care of this patient. Cardiology to sign off at this time. Please do not hesitate to call with questions/concerns.     Kristen Em MD/MPH  Internal Medicine, PGY2  p [phone]      REASON FOR CONSULT: chest pain with elevated trop    History of Present Illness   Juvenal Blake is a 55 year old male with PMH significant for relapsing NHL admitted from clinic on 6/24/21 for rituxan infusion rxn. At that time, patient was febrile, acutely hypoxic, & complaining of chest pain. Cardiology consulted on 6/25 due to elevated troponin in setting of persistent exertional chest discomfort & subjective dyspnea.     Patient reports having bilateral chest \"discomfort\" with ambulating to the bathroom & in his room. This discomfort is on the peripheral/lateral aspect of his chest & is associated with feeling short of breath & somewhat diaphoretic. He reports noticing these symptoms prior to admission " with ambulating long distances. He denies substernal chest pain, pain radiation to the arms or neck, tingling/numbness of the extremities, lightheadedness or nausea/vomiting with these episodes.     Remote tobacco history (quit 20 years ago), typically drinks 2 alcoholic beverages per night (stopped 2 weeks ago at the request of his oncologist), enrolled in medical marijuana program, denies other illicit substances.     PAST MEDICAL HISTORY:  Past Medical History:   Diagnosis Date     Cancer (H)      Non Hodgkin's lymphoma (H)      Shortness of breath        CURRENT MEDICATIONS:  No current outpatient medications on file.       PAST SURGICAL HISTORY:  Past Surgical History:   Procedure Laterality Date     HERNIA REPAIR, UMBILICAL       IR CHEST PORT PLACEMENT > 5 YRS OF AGE  2017     IR LYMPH NODE BIOPSY  10/1/2020     IR LYMPH NODE BIOPSY  2021       ALLERGIES     Allergies   Allergen Reactions     Ondansetron Itching       FAMILY HISTORY:  Family History   Problem Relation Age of Onset     Colon Cancer Mother      Lymphoma Father      No Known Problems Brother      No Known Problems Sister      No Known Problems Son      No Known Problems Sister      No Known Problems Son        SOCIAL HISTORY:  Social History     Socioeconomic History     Marital status:      Spouse name: None     Number of children: None     Years of education: None     Highest education level: None   Occupational History     None   Social Needs     Financial resource strain: None     Food insecurity     Worry: None     Inability: None     Transportation needs     Medical: None     Non-medical: None   Tobacco Use     Smoking status: Former Smoker     Quit date: 1995     Years since quittin.0     Smokeless tobacco: Never Used   Substance and Sexual Activity     Alcohol use: Yes     Frequency: 4 or more times a week     Drinks per session: 3 or 4     Drug use: Yes     Types: Marijuana     Sexual activity: None    Lifestyle     Physical activity     Days per week: None     Minutes per session: None     Stress: None   Relationships     Social connections     Talks on phone: None     Gets together: None     Attends Voodoo service: None     Active member of club or organization: None     Attends meetings of clubs or organizations: None     Relationship status: None     Intimate partner violence     Fear of current or ex partner: None     Emotionally abused: None     Physically abused: None     Forced sexual activity: None   Other Topics Concern     Parent/sibling w/ CABG, MI or angioplasty before 65F 55M? Not Asked   Social History Narrative     None       Review of Systems   The 10 point Review of Systems is negative other than noted in the HPI or here.     Physical Exam   Temp: 96.6  F (35.9  C) Temp src: Axillary BP: 112/76 Pulse: 75   Resp: 18 SpO2: 98 % O2 Device: None (Room air) Oxygen Delivery: 2 LPM  Vital Signs with Ranges  Temp:  [96.6  F (35.9  C)-100  F (37.8  C)] 96.6  F (35.9  C)  Pulse:  [] 75  Resp:  [9-34] 18  BP: ()/(59-87) 112/76  SpO2:  [91 %-99 %] 98 %  255 lbs 9.6 oz    General: A&Ox3, laying comfortably in bed, in NAAD, pleasant/conversive  HEENT: no scleral icterus, no obvious deformities   Cardiac: RRR, no murmurs/rubs/gallops, 2+ pulses  Pulm: CTAB, no wheezing appreciated, no increased WOB   GI: normoactive BS, non-tender, non-distended  Skin: no rashes or bruising   Extremities: bilateral LE edema to the knee, RLE very slightly larger than LLE    Neuro: no FND      Data   Data reviewed today:  I personally reviewed the EKG tracing.  Recent Labs   Lab 06/25/21  0816 06/25/21  0410 06/24/21  1547 06/24/21  0818   WBC  --  10.2 5.0 12.9*   HGB  --  6.5* 7.3* 7.0*   MCV  --  90 93 90   PLT  --  54* 41* 55*   INR 1.17* 1.17*  --   --      --  133 134   POTASSIUM 4.8  --  4.4 4.2   CHLORIDE 105  --  102 102   CO2 24  --  21 24   BUN 26  --  18 20   CR 1.18  --  1.52* 1.55*   ANIONGAP  7  --  11 8   TRE 8.6  --  8.9 8.8   *  --  131* 127*   ALBUMIN 2.2*  --  2.4* 2.4*   PROTTOTAL 5.6*  --  5.8* 5.6*   BILITOTAL 0.6  --  0.8 0.7   ALKPHOS 265*  --  291* 261*   ALT 90*  --  99* 92*   AST 72*  --  95* 75*   TROPI 0.188*  --   --   --        Recent Results (from the past 24 hour(s))   XR Chest Port 1 View    Narrative    Portable chest    INDICATION: soa    COMPARISON: 6/11/2021    FINDINGS: Low respiratory volume. Old left rib fracture. Right IJ  catheter tip in the distal SVC. No pneumothorax.      Impression    IMPRESSION: Low inspiratory volume. Old left rib fracture.    ALIDA WERNER MD

## 2021-06-25 NOTE — TELEPHONE ENCOUNTER
Received a in basket message from Estrella/triage nurse & Dr. Crooks.  Juvenal sent a message to Dr. Crooks about records to go to [facility] for a 2nd opinion, this message sent to writer. (no referral order in Epic).  Called Juvenal asked if he had a scheduled appointment at [facility] (not at this time).  He did forward a email from [facility], requesting cancer care records (no more then 50pgs) and radiology images.  Writer found this in Epic media with the fax number to send pertinent records. [phone].  Successfully faxed 61 pages of MD Note 5/21/2021, all pathology reports 2021,2019,2017,2010 and recent PET report.  Faxed a film imaging request to  [employer] to complete the request for images

## 2021-06-25 NOTE — PROGRESS NOTES
ALL SMARTFIELDS MUST BE COMPLETED FOR PATIENT CARE AND BILLING    6/24/2021     E-Consult has been accepted.    Interprofessional consultation requested by:  Rosa Terry MD      Clinical Question/Purpose: MY CLINICAL QUESTION IS: Can you please look at his last ekg (June 8, 2021) and compare with previous one?  - this is a study subject on research. Has no cardiac sympomts. We just want to see if he should see cardiology with ekg changes. I suspect not.    Patient assessment and information reviewed:       Recommendations:     The EKG demonstrates some minor ST changes compared to the previous. Given normal LVEF on his recent echo and a recent negative stress, I would not recommend further evaluation in the absence of symptoms. Thanks.       The recommendations provided in this E-Consult are based on the clinical data available to me at this time, and are furnished without the benefit of a comprehensive in-person or virtual patient evaluation, Any new clinical issues or changes in patient status since the filing of this E-Consult will need to be taken into account when assessing these recommendations. Please contact me if you have further questions.    My total time spent reviewing clinical information and formulating assessment was 10 minutes.    Report sent automatically to requesting provider once signed.     Charge codes - 7707201 (5+ minutes) or 46O4129 (No charge code)    Milena Moore MD

## 2021-06-25 NOTE — UTILIZATION REVIEW
Admission Status; Secondary Review Determination         Under the authority of the Utilization Management Committee, the utilization review process indicated a secondary review on the above patient.  The review outcome is based on review of the medical records, discussions with staff, and applying clinical experience noted on the date of the review.        (x)      Inpatient Status Appropriate - This patient's medical care is consistent with medical management for inpatient care and reasonable inpatient medical practice.     RATIONALE FOR DETERMINATION   The patient is a 55-year-old male admitted on 6/24/2021.  He was receiving treatment for lymphoma and specifically was having an infusion of Rituxan when he started to have rigors, fever, chest tightness with shortness of breath and tachycardia.  He had not had a similar reaction in the past and has been on maintenance Rituxan since 2012.  He also has prostate CA with spread.  Hemoglobin dropped to 7.0.  Patient required O2 3 L to maintain SPO2 of 94%.  Patient received IV methyl prednisolone and albuterol inhalers along with 2 doses of epinephrine.  Also noted is an elevated troponin of 0.188 which has not been addressed in the notes today but is indicative of cardiac strain or underlying cardiac issue.  Patient is typed and screened for blood transfusion and typed and crossed for 2 units currently.  Likely the patient will be required to stay an additional day in the hospital and inpatient is appropriate.  Narcisa Purdy PA-C was texted through [employer] and advised that if the patient is being allowed to be discharged today from the hospital, observation status would be appropriate.  This is a conditional review and likely will be maintained at inpatient status.      The severity of illness, intensity of service provided, expected LOS and risk for adverse outcome make the care complex, high risk and appropriate for hospital admission.        The  information on this document is developed by the utilization review team in order for the business office to ensure compliance.  This only denotes the appropriateness of proper admission status and does not reflect the quality of care rendered.         The definitions of Inpatient Status and Observation Status used in making the determination above are those provided in the CMS Coverage Manual, Chapter 1 and Chapter 6, section 70.4.      Sincerely,     Anthony Marie MD  Physician Advisor  Utilization Review/ Case Management  [facility].

## 2021-06-25 NOTE — PROVIDER NOTIFICATION
MD([id number]) was notified again about pt's not having any orders for medication or blood products. Pt have Hgb of 6.5, pt still nausea and still no order in place.

## 2021-06-26 ENCOUNTER — APPOINTMENT (OUTPATIENT)
Dept: PHYSICAL THERAPY | Facility: CLINIC | Age: 56
DRG: 915 | End: 2021-06-26
Payer: COMMERCIAL

## 2021-06-26 LAB
ABO + RH BLD: NORMAL
ABO + RH BLD: NORMAL
ANION GAP SERPL CALCULATED.3IONS-SCNC: 6 MMOL/L (ref 3–14)
ANION GAP SERPL CALCULATED.3IONS-SCNC: 6 MMOL/L (ref 3–14)
ANISOCYTOSIS BLD QL SMEAR: SLIGHT
BASOPHILS # BLD AUTO: 0 10E9/L (ref 0–0.2)
BASOPHILS NFR BLD AUTO: 0 %
BLASTS # BLD: 0.2 10E9/L
BLASTS BLD QL SMEAR: 2.7 %
BLD GP AB SCN SERPL QL: NORMAL
BLD PROD TYP BPU: NORMAL
BLD PROD TYP BPU: NORMAL
BLD UNIT ID BPU: 0
BLOOD BANK CMNT PATIENT-IMP: NORMAL
BLOOD PRODUCT CODE: NORMAL
BPU ID: NORMAL
BUN SERPL-MCNC: 35 MG/DL (ref 7–30)
BUN SERPL-MCNC: 35 MG/DL (ref 7–30)
CA-I BLD-MCNC: 4.8 MG/DL (ref 4.4–5.2)
CALCIUM SERPL-MCNC: 8.6 MG/DL (ref 8.5–10.1)
CALCIUM SERPL-MCNC: 8.9 MG/DL (ref 8.5–10.1)
CHLORIDE SERPL-SCNC: 104 MMOL/L (ref 94–109)
CHLORIDE SERPL-SCNC: 107 MMOL/L (ref 94–109)
CO2 SERPL-SCNC: 25 MMOL/L (ref 20–32)
CO2 SERPL-SCNC: 27 MMOL/L (ref 20–32)
CREAT SERPL-MCNC: 1.22 MG/DL (ref 0.66–1.25)
CREAT SERPL-MCNC: 1.3 MG/DL (ref 0.66–1.25)
DACRYOCYTES BLD QL SMEAR: SLIGHT
DIFFERENTIAL METHOD BLD: ABNORMAL
EOSINOPHIL # BLD AUTO: 0.1 10E9/L (ref 0–0.7)
EOSINOPHIL NFR BLD AUTO: 0.9 %
ERYTHROCYTE [DISTWIDTH] IN BLOOD BY AUTOMATED COUNT: 16.6 % (ref 10–15)
GFR SERPL CREATININE-BSD FRML MDRD: 60 ML/MIN/{1.73_M2}
GFR SERPL CREATININE-BSD FRML MDRD: 65 ML/MIN/{1.73_M2}
GLUCOSE SERPL-MCNC: 132 MG/DL (ref 70–99)
GLUCOSE SERPL-MCNC: 151 MG/DL (ref 70–99)
HCT VFR BLD AUTO: 21.4 % (ref 40–53)
HGB BLD-MCNC: 6.7 G/DL (ref 13.3–17.7)
LYMPHOCYTES # BLD AUTO: 0.2 10E9/L (ref 0.8–5.3)
LYMPHOCYTES NFR BLD AUTO: 2.7 %
MCH RBC QN AUTO: 28.3 PG (ref 26.5–33)
MCHC RBC AUTO-ENTMCNC: 31.3 G/DL (ref 31.5–36.5)
MCV RBC AUTO: 90 FL (ref 78–100)
MONOCYTES # BLD AUTO: 0.1 10E9/L (ref 0–1.3)
MONOCYTES NFR BLD AUTO: 0.9 %
NEUTROPHILS # BLD AUTO: 5.5 10E9/L (ref 1.6–8.3)
NEUTROPHILS NFR BLD AUTO: 92.8 %
NUM BPU REQUESTED: 2
PHOSPHATE SERPL-MCNC: 4.9 MG/DL (ref 2.5–4.5)
PLATELET # BLD AUTO: 46 10E9/L (ref 150–450)
PLATELET # BLD EST: ABNORMAL 10*3/UL
POIKILOCYTOSIS BLD QL SMEAR: SLIGHT
POTASSIUM SERPL-SCNC: 4.7 MMOL/L (ref 3.4–5.3)
POTASSIUM SERPL-SCNC: 4.8 MMOL/L (ref 3.4–5.3)
RBC # BLD AUTO: 2.37 10E12/L (ref 4.4–5.9)
SODIUM SERPL-SCNC: 136 MMOL/L (ref 133–144)
SODIUM SERPL-SCNC: 138 MMOL/L (ref 133–144)
SPECIMEN EXP DATE BLD: NORMAL
TRANSFUSION STATUS PATIENT QL: NORMAL
TRANSFUSION STATUS PATIENT QL: NORMAL
URATE SERPL-MCNC: 5.8 MG/DL (ref 3.5–7.2)
WBC # BLD AUTO: 5.9 10E9/L (ref 4–11)

## 2021-06-26 PROCEDURE — 85025 COMPLETE CBC W/AUTO DIFF WBC: CPT | Performed by: STUDENT IN AN ORGANIZED HEALTH CARE EDUCATION/TRAINING PROGRAM

## 2021-06-26 PROCEDURE — 84100 ASSAY OF PHOSPHORUS: CPT | Performed by: STUDENT IN AN ORGANIZED HEALTH CARE EDUCATION/TRAINING PROGRAM

## 2021-06-26 PROCEDURE — 250N000013 HC RX MED GY IP 250 OP 250 PS 637: Performed by: INTERNAL MEDICINE

## 2021-06-26 PROCEDURE — 80048 BASIC METABOLIC PNL TOTAL CA: CPT | Performed by: PHYSICIAN ASSISTANT

## 2021-06-26 PROCEDURE — 120N000005 HC R&B MS OVERFLOW UMMC

## 2021-06-26 PROCEDURE — 80048 BASIC METABOLIC PNL TOTAL CA: CPT | Performed by: STUDENT IN AN ORGANIZED HEALTH CARE EDUCATION/TRAINING PROGRAM

## 2021-06-26 PROCEDURE — P9040 RBC LEUKOREDUCED IRRADIATED: HCPCS | Performed by: STUDENT IN AN ORGANIZED HEALTH CARE EDUCATION/TRAINING PROGRAM

## 2021-06-26 PROCEDURE — 82330 ASSAY OF CALCIUM: CPT | Performed by: STUDENT IN AN ORGANIZED HEALTH CARE EDUCATION/TRAINING PROGRAM

## 2021-06-26 PROCEDURE — 250N000013 HC RX MED GY IP 250 OP 250 PS 637: Performed by: STUDENT IN AN ORGANIZED HEALTH CARE EDUCATION/TRAINING PROGRAM

## 2021-06-26 PROCEDURE — 84550 ASSAY OF BLOOD/URIC ACID: CPT | Performed by: STUDENT IN AN ORGANIZED HEALTH CARE EDUCATION/TRAINING PROGRAM

## 2021-06-26 PROCEDURE — 250N000011 HC RX IP 250 OP 636: Performed by: INTERNAL MEDICINE

## 2021-06-26 PROCEDURE — 99233 SBSQ HOSP IP/OBS HIGH 50: CPT | Performed by: INTERNAL MEDICINE

## 2021-06-26 PROCEDURE — 97535 SELF CARE MNGMENT TRAINING: CPT | Mod: GP | Performed by: PHYSICAL THERAPIST

## 2021-06-26 PROCEDURE — 250N000011 HC RX IP 250 OP 636: Performed by: PHYSICIAN ASSISTANT

## 2021-06-26 PROCEDURE — 97530 THERAPEUTIC ACTIVITIES: CPT | Mod: GP | Performed by: PHYSICAL THERAPIST

## 2021-06-26 RX ORDER — FUROSEMIDE 10 MG/ML
40 INJECTION INTRAMUSCULAR; INTRAVENOUS ONCE
Status: COMPLETED | OUTPATIENT
Start: 2021-06-26 | End: 2021-06-26

## 2021-06-26 RX ADMIN — TOLTERODINE TARTRATE 4 MG: 4 CAPSULE, EXTENDED RELEASE ORAL at 08:13

## 2021-06-26 RX ADMIN — Medication 5 ML: at 16:31

## 2021-06-26 RX ADMIN — ACYCLOVIR 800 MG: 200 CAPSULE ORAL at 08:13

## 2021-06-26 RX ADMIN — ACYCLOVIR 800 MG: 200 CAPSULE ORAL at 19:49

## 2021-06-26 RX ADMIN — LORAZEPAM 0.5 MG: 0.5 TABLET ORAL at 19:49

## 2021-06-26 RX ADMIN — DIPHENHYDRAMINE HYDROCHLORIDE 25 MG: 25 CAPSULE ORAL at 05:24

## 2021-06-26 RX ADMIN — OMEPRAZOLE 20 MG: 20 CAPSULE, DELAYED RELEASE ORAL at 08:13

## 2021-06-26 RX ADMIN — Medication 5 ML: at 08:14

## 2021-06-26 RX ADMIN — ACETAMINOPHEN 650 MG: 325 TABLET, FILM COATED ORAL at 16:43

## 2021-06-26 RX ADMIN — ALLOPURINOL 300 MG: 300 TABLET ORAL at 08:13

## 2021-06-26 RX ADMIN — FUROSEMIDE 40 MG: 10 INJECTION, SOLUTION INTRAVENOUS at 10:49

## 2021-06-26 RX ADMIN — FLUCONAZOLE 100 MG: 100 TABLET ORAL at 08:13

## 2021-06-26 RX ADMIN — Medication 1 CAPSULE: at 08:13

## 2021-06-26 RX ADMIN — ACETAMINOPHEN 325 MG: 325 TABLET, FILM COATED ORAL at 05:22

## 2021-06-26 ASSESSMENT — ACTIVITIES OF DAILY LIVING (ADL)
ADLS_ACUITY_SCORE: 10

## 2021-06-26 ASSESSMENT — MIFFLIN-ST. JEOR: SCORE: 1960.09

## 2021-06-26 NOTE — PLAN OF CARE
AVSS.  Up to BR independently, some SOB on exertion.  Received 40 mg lasix with good output.  Appetite fair.  Pt reports mild hemorrhoid pain, tried to order sitz bath but no longer available per supply, tylenol given x1.  Continue POC.     Problem: Adult Inpatient Plan of Care  Goal: Plan of Care Review  Outcome: No Change     Problem: Adult Inpatient Plan of Care  Goal: Patient-Specific Goal (Individualized)  Outcome: No Change     Problem: Adult Inpatient Plan of Care  Goal: Absence of Hospital-Acquired Illness or Injury  Outcome: No Change     Problem: Adult Inpatient Plan of Care  Goal: Absence of Hospital-Acquired Illness or Injury  Intervention: Identify and Manage Fall Risk  Recent Flowsheet Documentation  Taken 6/26/2021 0852 by Carmela Zamarripa, VALERIE  Safety Promotion/Fall Prevention: clutter free environment maintained     Problem: Adult Inpatient Plan of Care  Goal: Absence of Hospital-Acquired Illness or Injury  Intervention: Prevent Skin Injury  Recent Flowsheet Documentation  Taken 6/26/2021 0852 by Carmela Zamarripa, RN  Body Position: position changed independently     Problem: Adult Inpatient Plan of Care  Goal: Absence of Hospital-Acquired Illness or Injury  Intervention: Prevent and Manage VTE (Venous Thromboembolism) Risk  Recent Flowsheet Documentation  Taken 6/26/2021 0852 by Carmela Zamarripa, VALERIE  VTE Prevention/Management: ambulation promoted     Problem: Adult Inpatient Plan of Care  Goal: Absence of Hospital-Acquired Illness or Injury  Intervention: Prevent and Manage VTE (Venous Thromboembolism) Risk  Recent Flowsheet Documentation  Taken 6/26/2021 0852 by Carmela Zamarripa, RN  VTE Prevention/Management: ambulation promoted     Problem: Adult Inpatient Plan of Care  Goal: Absence of Hospital-Acquired Illness or Injury  Intervention: Prevent Infection  Recent Flowsheet Documentation  Taken 6/26/2021 0852 by Carmela Zamarripa, RN  Infection Prevention: single patient room provided     Problem: Adult Inpatient  Plan of Care  Goal: Optimal Comfort and Wellbeing  Outcome: No Change     Problem: Fatigue (Stem Cell/Bone Marrow Transplant)  Goal: Energy Level Supports Daily Activity  Outcome: No Change     Problem: Diarrhea (Stem Cell/Bone Marrow Transplant)  Goal: Diarrhea Symptom Control  Outcome: No Change     Problem: Hematologic Alteration (Stem Cell/Bone Marrow Transplant)  Goal: Blood Counts Within Acceptable Range  Outcome: No Change     Problem: Hypersensitivity Reaction (Stem Cell/Bone Marrow Transplant)  Goal: Absence of Hypersensitivity Reaction  Outcome: No Change     Problem: Infection Risk (Stem Cell/Bone Marrow Transplant)  Goal: Absence of Infection  Outcome: No Change     Problem: Mucositis (Stem Cell/Bone Marrow Transplant)  Goal: Mucous Membrane Health and Integrity  Outcome: No Change     Problem: Nausea and Vomiting (Stem Cell/Bone Marrow Transplant)  Goal: Nausea and Vomiting Symptom Relief  Outcome: No Change     Problem: Nutrition Intake Altered (Stem Cell/Bone Marrow Transplant)  Goal: Optimal Nutrition Intake  Outcome: No Change

## 2021-06-26 NOTE — PLAN OF CARE
AVSS. Up independent in room. Pt continues to have SOB on exertion but per pt seems to have improved some. Appetite and oral intake are fair. Voiding well overnight without issue. Denied pain, N/V. Troponin recheck at 2100 came back 0.120. PRN ativan given for sleep. Hbg came back 6.7, 1 unit of RBC's started. No other replacements needed. Continue plan of  Care.   Problem: Adult Inpatient Plan of Care  Goal: Plan of Care Review  Outcome: No Change  Goal: Patient-Specific Goal (Individualized)  Outcome: No Change  Goal: Absence of Hospital-Acquired Illness or Injury  Outcome: No Change  Intervention: Identify and Manage Fall Risk  Recent Flowsheet Documentation  Taken 6/25/2021 2046 by Tasia Goncalves RN  Safety Promotion/Fall Prevention:    clutter free environment maintained    nonskid shoes/slippers when out of bed    patient and family education  Intervention: Prevent Skin Injury  Recent Flowsheet Documentation  Taken 6/25/2021 2046 by Tasia Goncalves, RN  Body Position: position changed independently  Intervention: Prevent and Manage VTE (Venous Thromboembolism) Risk  Recent Flowsheet Documentation  Taken 6/25/2021 2046 by Tasia Goncalves RN  VTE Prevention/Management: ambulation promoted  Intervention: Prevent Infection  Recent Flowsheet Documentation  Taken 6/25/2021 2046 by Tasia Goncalves, RN  Infection Prevention:    cohorting utilized    hand hygiene promoted  Goal: Optimal Comfort and Wellbeing  Outcome: No Change  Goal: Readiness for Transition of Care  Outcome: No Change     Problem: Discharge Planning  Goal: Discharge Planning (Adult, OB, Behavioral, Peds)  Outcome: No Change

## 2021-06-26 NOTE — PROGRESS NOTES
"BMT Daily Progress  Note   June 26, 2021    Juvenal Blake is a 55 year old male PMH significant for NHL s/p  Bendamustine + Rituxan then Bendamustine +  Rituxan once again the R-CHOP followed by Nam-NK cell infusion. He is enrolled in protocol [id number], which utilizes Cy/Flu as a lymphodepletion regimen as bridge to Yescarta now admitted from clinic for rituxan  infusion rxn     HPI:  Juvenal feels a bit better.  His abdomen feels less taut.  He slept well with his CPAP and doesn't complain of much shortness of breath.  He is a bit lightheaded, but that has been that way for awhile, he says.     ROS:  10 point ROS negative except as above.     Physical Exam:   /72   Pulse 73   Temp 97.7  F (36.5  C) (Oral)   Resp 17   Ht 1.74 m (5' 8.5\")   Wt 115.9 kg (255 lb 9.6 oz)   SpO2 96%   BMI 38.29 kg/m    General: Conversant. KPS 70; less jaundiced than when I last saw him on 6/23  Eyes: PERRL   Lungs: CTA bilaterally, decreased breath sounds bilaterally; no wheeze. +shallow breathing due to abdomen  Cardiovascular: RRR, no M/R/G   Abdominal/Rectal: ++protuberant, ++firm to palpation.  Diffusely tender.   Lymphatics: 2+ pitting edema bilateral shins; wearing compression stockings.   Skin: no rashes or petechaie   Neuro: A&O   Additional Findings: Jacobs site NT, no drainage.    Pre-treatment ICANS: 10/10.  Patient has writing log with him.  No ICANS done 6/26.       Labs:  Lab Results   Component Value Date    WBC 5.9 06/26/2021    ANEU 5.5 06/26/2021    HGB 6.7 (LL) 06/26/2021    HCT 21.4 (L) 06/26/2021    PLT 46 (LL) 06/26/2021     06/26/2021    POTASSIUM 4.8 06/26/2021    CHLORIDE 107 06/26/2021    CO2 25 06/26/2021     (H) 06/26/2021    BUN 35 (H) 06/26/2021    CR 1.30 (H) 06/26/2021    MAG 2.1 06/23/2021    INR 1.17 (H) 06/25/2021    AST 72 (H) 06/25/2021    ALT 90 (H) 06/25/2021       Assessment and Plan:   Juvenal Blake is a 55 year old male PMH significant for NHL s/p  Bendamustine + " Rituxan then Bendamustine +  Rituxan once again the R-CHOP followed by Nam-NK cell infusion. He is enrolled in protocol [id number], which utilizes Cy/Flu as a lymphodepletion regimen as bridge to Yescarta now admitted from clinic for rituxan  infusion rxn      * Rituxan Infusion rxn, Resolved  Pt had Rituxan infusion on 6/24 as outpatient where he started to have rigors, fever, chest tightness with SOB, and tachycardia; Did not have such response to past rituxan treatments (he is on maintenance rituxan since 2012) Was treated with 2 doses of epinephrine, albuterol inhalation, methylprednisone. Pt was on 3 to 6L of O2 with SpO2 of 94%; since ED admission, by 7 PM on 6/24 pt is hemodynamically stable with O2 at 97% on room air, pulse rate ~90 and Blood pressure around 120s/70s; pt continues to have expiratory wheeze with tachypnea. Albuterol nebulization PRN. Not requiring oxygen.    1.  Lymphoma:  - He had 6 cycles of bendamustine and Rituxan completed in October, 2010. He had 2 years of maintenance Rituxan therapy finishing in September, 2012.  - Second course of bendamustine plus rituximab started February 20, 2017. Cycle 6 was completed on July 11, 2017. Then had maintenance rituximab through January, 2019.  - O-CHOP started at second relapse. Cycle 1 given April 23, 2019. Cycle 6 given August 16, 2019.  [facility]  - [facility]idelalisib [facility], [facility] 150 mg twice daily.  stopped Idelalisib - last dose was on 6/10 at 8am.   - ; flu/cy 6/23; flu/cy/rituxan 6/24 (rituxan was abbreviated due to reaction); flu/cy 6/25.  Will get cells 6/28.    - On allopurinol. See FEN/Renal below    2.  HEME: Keep hgb >7, plt<10,000  - Needs 1UPRBC today (seems to be needing daily at this point).     3.  :   Initial radical prostatectomy and bilateral lymph node dissection. November 15, 2017. Prostatic adenocarcinoma [place] 4+3 = 7 with tertiary pattern 5. Tumor  "involves 45% of total surface area. Positive for extensive extraprostatic extension. Positive for bilateral seminal vesicle invasion. Multiple margins positive. Lymphovascular invasion not identified. Perineural invasion was noted. Lymph nodes negative. 3 were examined (2 right obturator and 1 left obturator).    Completed radiation to the prostate fossa and pelvic lymph nodes at [facility] early May 2018. He received 4500 cGy in 25 fractions. He then had 2340 cGy boost in 13 fractions to the prostatic fossa, for a total dose of 6240 cGy in 38 treatment fractions delivered over 54 days. He did not receive hormonal therapy:      4.  ID:   - prophy ACV/fluc; cefepime if spikes (not neutropenic)   C diff colitis: finished vancomycin 6/23, treated for 7 days  COVID negative. RVP + for Rhinovirus 6/11; asymptomatic.    5.  FEN/Renal: Monitor Cr/lytes  - Volume Overload: 40mg IV once this morning; will sign out to hospitalist to check 1600 BMP and I/O, repeat lasix if needed.  - Monitor TLS: uric acid 5.2 Phos elevated to 5.5, K and Cr wnl. Continue allopurinol  Lymphedema consulted    6. Abdominal Pain secondary to cancer. Also now seeing a rise in LFTs, which suspect to be related, stable on admit  Has oxycodone 15 tabs and lorazepam 15 tabs for difficulty sleeping. Aware not to take both together due to sedation    7. CV: Cardiology consulted for SOB, elevated troponin. Per their note: \"Likely Type II MI in setting of demand ischemia from tachycardia, acute on chronic anemia, acute on chronic illness. No evidence of heart failure at this time.\"  Recommend checking lipid profile and outpatient follow up for non-obstructive CAD workup when he is more stable. Also recommended LE U/S to rule out DVTs; this was done and is negative. Cards service ordered follow up as outpatient in 1 month. They have signed off.   Troponins are trending down. Fasting lipid panel ordered for 6/27, 4am draw. Echo read pending from " "6/25.     Penny Garcia PA-C  6/26/2021  Attending provider note:  Patient has been seen and evaluated by me. I have reviewed today's vital signs, medications, labs and imaging results independently. I have discussed the plan with the team and agree with the findings and plan in this note.      55 years old male with relapsed refractory NHL, planned for , with lymphodepleting chemo: flu/cy 6/23; flu/cy/rituxan 6/24; flu/cy 6/25.    Yesterday, he had a severe reaction to Rituxan, taken to the ED and then admitted (2 doses of epi, albuterol nebs, O2)    Also noted to have EKG changes and a troponin leak- appreciate cardiology input: Likely Type II MI in setting of demand ischemia from tachycardia, acute on chronic anemia, acute on chronic illness. TTE without wall motion abnormality. Would be appropriate to undergo further workup for non-obstructive CAD as an outpatient       He is now feeling better. Sitting up     On exam:  Blood pressure 112/72, pulse 73, temperature 97.7  F (36.5  C), temperature source Oral, resp. rate 17, height 1.74 m (5' 8.5\"), weight 115.9 kg (255 lb 9.6 oz), SpO2 96 %.    HEENT: PERRL, EOMI, MMM  Chest: CTAB  CVS: S1 S2 RRR, no murmurs or gallops  PA: soft, non tended  CNS: non focal    55 years old with relapsed, refractory NHL, planned for   after completing lymphodepleting chemo. He completed Cy/ flu on Friday  Troponins are trending down now, we will discontinue checking. Monitor closely.  on Monday    Inga Selby"

## 2021-06-27 LAB
ANION GAP SERPL CALCULATED.3IONS-SCNC: 6 MMOL/L (ref 3–14)
ANISOCYTOSIS BLD QL SMEAR: SLIGHT
BACTERIA SPEC CULT: NORMAL
BASOPHILS # BLD AUTO: 0 10E9/L (ref 0–0.2)
BASOPHILS NFR BLD AUTO: 0 %
BLASTS # BLD: 0.1 10E9/L
BLASTS BLD QL SMEAR: 1.8 %
BUN SERPL-MCNC: 34 MG/DL (ref 7–30)
CALCIUM SERPL-MCNC: 8.8 MG/DL (ref 8.5–10.1)
CHLORIDE SERPL-SCNC: 103 MMOL/L (ref 94–109)
CHOLEST SERPL-MCNC: 157 MG/DL
CO2 SERPL-SCNC: 27 MMOL/L (ref 20–32)
CREAT SERPL-MCNC: 1.33 MG/DL (ref 0.66–1.25)
DIFFERENTIAL METHOD BLD: ABNORMAL
EOSINOPHIL # BLD AUTO: 0.1 10E9/L (ref 0–0.7)
EOSINOPHIL NFR BLD AUTO: 2.6 %
ERYTHROCYTE [DISTWIDTH] IN BLOOD BY AUTOMATED COUNT: 16.2 % (ref 10–15)
GFR SERPL CREATININE-BSD FRML MDRD: 59 ML/MIN/{1.73_M2}
GLUCOSE SERPL-MCNC: 112 MG/DL (ref 70–99)
HCT VFR BLD AUTO: 23.2 % (ref 40–53)
HDLC SERPL-MCNC: 20 MG/DL
HGB BLD-MCNC: 7.5 G/DL (ref 13.3–17.7)
LDLC SERPL CALC-MCNC: 65 MG/DL
LYMPHOCYTES # BLD AUTO: 0.1 10E9/L (ref 0.8–5.3)
LYMPHOCYTES NFR BLD AUTO: 1.8 %
Lab: NORMAL
MCH RBC QN AUTO: 28.3 PG (ref 26.5–33)
MCHC RBC AUTO-ENTMCNC: 32.3 G/DL (ref 31.5–36.5)
MCV RBC AUTO: 88 FL (ref 78–100)
MONOCYTES # BLD AUTO: 0 10E9/L (ref 0–1.3)
MONOCYTES NFR BLD AUTO: 0.9 %
NEUTROPHILS # BLD AUTO: 4 10E9/L (ref 1.6–8.3)
NEUTROPHILS NFR BLD AUTO: 92.9 %
NONHDLC SERPL-MCNC: 137 MG/DL
OVALOCYTES BLD QL SMEAR: SLIGHT
PLATELET # BLD AUTO: 40 10E9/L (ref 150–450)
PLATELET # BLD EST: ABNORMAL 10*3/UL
POIKILOCYTOSIS BLD QL SMEAR: SLIGHT
POTASSIUM SERPL-SCNC: 5 MMOL/L (ref 3.4–5.3)
RBC # BLD AUTO: 2.65 10E12/L (ref 4.4–5.9)
SODIUM SERPL-SCNC: 136 MMOL/L (ref 133–144)
SPECIMEN SOURCE: NORMAL
TRIGL SERPL-MCNC: 362 MG/DL
WBC # BLD AUTO: 4.3 10E9/L (ref 4–11)

## 2021-06-27 PROCEDURE — 272N000201 ZZ HC ADHESIVE SKIN CLOSURE, DERMABOND

## 2021-06-27 PROCEDURE — 272N000458 ZZ HC KIT, 5 FR DL BIOFLO OPEN ENDED PICC

## 2021-06-27 PROCEDURE — 250N000009 HC RX 250: Performed by: STUDENT IN AN ORGANIZED HEALTH CARE EDUCATION/TRAINING PROGRAM

## 2021-06-27 PROCEDURE — 250N000011 HC RX IP 250 OP 636

## 2021-06-27 PROCEDURE — 36569 INSJ PICC 5 YR+ W/O IMAGING: CPT

## 2021-06-27 PROCEDURE — 80048 BASIC METABOLIC PNL TOTAL CA: CPT | Performed by: STUDENT IN AN ORGANIZED HEALTH CARE EDUCATION/TRAINING PROGRAM

## 2021-06-27 PROCEDURE — 250N000013 HC RX MED GY IP 250 OP 250 PS 637: Performed by: INTERNAL MEDICINE

## 2021-06-27 PROCEDURE — 80061 LIPID PANEL: CPT | Performed by: STUDENT IN AN ORGANIZED HEALTH CARE EDUCATION/TRAINING PROGRAM

## 2021-06-27 PROCEDURE — 85025 COMPLETE CBC W/AUTO DIFF WBC: CPT | Performed by: STUDENT IN AN ORGANIZED HEALTH CARE EDUCATION/TRAINING PROGRAM

## 2021-06-27 PROCEDURE — 250N000009 HC RX 250: Performed by: PHYSICIAN ASSISTANT

## 2021-06-27 PROCEDURE — 250N000013 HC RX MED GY IP 250 OP 250 PS 637: Performed by: STUDENT IN AN ORGANIZED HEALTH CARE EDUCATION/TRAINING PROGRAM

## 2021-06-27 PROCEDURE — 99233 SBSQ HOSP IP/OBS HIGH 50: CPT | Performed by: INTERNAL MEDICINE

## 2021-06-27 PROCEDURE — 250N000011 HC RX IP 250 OP 636: Performed by: INTERNAL MEDICINE

## 2021-06-27 PROCEDURE — 120N000005 HC R&B MS OVERFLOW UMMC

## 2021-06-27 RX ORDER — LIDOCAINE 40 MG/G
CREAM TOPICAL
Status: DISCONTINUED | OUTPATIENT
Start: 2021-06-27 | End: 2021-06-28 | Stop reason: HOSPADM

## 2021-06-27 RX ORDER — HEPARIN SODIUM,PORCINE 10 UNIT/ML
5-10 VIAL (ML) INTRAVENOUS
Status: DISCONTINUED | OUTPATIENT
Start: 2021-06-27 | End: 2021-06-28 | Stop reason: HOSPADM

## 2021-06-27 RX ORDER — HEPARIN SODIUM,PORCINE 10 UNIT/ML
2-5 VIAL (ML) INTRAVENOUS
Status: DISCONTINUED | OUTPATIENT
Start: 2021-06-27 | End: 2021-06-28 | Stop reason: HOSPADM

## 2021-06-27 RX ORDER — HEPARIN SODIUM,PORCINE 10 UNIT/ML
5-10 VIAL (ML) INTRAVENOUS EVERY 24 HOURS
Status: DISCONTINUED | OUTPATIENT
Start: 2021-06-27 | End: 2021-06-28 | Stop reason: HOSPADM

## 2021-06-27 RX ADMIN — LORAZEPAM 0.5 MG: 2 INJECTION INTRAMUSCULAR; INTRAVENOUS at 21:11

## 2021-06-27 RX ADMIN — FLUCONAZOLE 100 MG: 100 TABLET ORAL at 08:25

## 2021-06-27 RX ADMIN — PROCHLORPERAZINE MALEATE 10 MG: 5 TABLET ORAL at 12:26

## 2021-06-27 RX ADMIN — LIDOCAINE HYDROCHLORIDE ANHYDROUS 1 ML: 10 INJECTION, SOLUTION INFILTRATION at 10:24

## 2021-06-27 RX ADMIN — ACETAMINOPHEN 650 MG: 325 TABLET, FILM COATED ORAL at 21:11

## 2021-06-27 RX ADMIN — LORAZEPAM 0.5 MG: 0.5 TABLET ORAL at 17:03

## 2021-06-27 RX ADMIN — ALLOPURINOL 300 MG: 300 TABLET ORAL at 08:25

## 2021-06-27 RX ADMIN — Medication 5 ML: at 12:26

## 2021-06-27 RX ADMIN — Medication 1 CAPSULE: at 08:25

## 2021-06-27 RX ADMIN — PROCHLORPERAZINE MALEATE 10 MG: 5 TABLET ORAL at 19:09

## 2021-06-27 RX ADMIN — LOPERAMIDE HYDROCHLORIDE 2 MG: 2 CAPSULE ORAL at 17:03

## 2021-06-27 RX ADMIN — ACYCLOVIR 800 MG: 200 CAPSULE ORAL at 08:24

## 2021-06-27 RX ADMIN — Medication 5 ML: at 04:00

## 2021-06-27 RX ADMIN — TOLTERODINE TARTRATE 4 MG: 4 CAPSULE, EXTENDED RELEASE ORAL at 08:25

## 2021-06-27 RX ADMIN — ACYCLOVIR 800 MG: 200 CAPSULE ORAL at 21:11

## 2021-06-27 RX ADMIN — ALBUTEROL SULFATE 2.5 MG: 2.5 SOLUTION RESPIRATORY (INHALATION) at 16:39

## 2021-06-27 RX ADMIN — OMEPRAZOLE 20 MG: 20 CAPSULE, DELAYED RELEASE ORAL at 08:25

## 2021-06-27 ASSESSMENT — ACTIVITIES OF DAILY LIVING (ADL)
ADLS_ACUITY_SCORE: 10

## 2021-06-27 ASSESSMENT — MIFFLIN-ST. JEOR: SCORE: 1888.88

## 2021-06-27 NOTE — PROGRESS NOTES
"BMT Daily Progress  Note   June 27, 2021    Juvenal Blake is a 55 year old male PMH significant for NHL s/p  Bendamustine + Rituxan then Bendamustine +  Rituxan once again the R-CHOP followed by Nam-NK cell infusion. He is enrolled in protocol [id number], which utilizes Cy/Flu as a lymphodepletion regimen as bridge to Yescarta now admitted from clinic for rituxan  infusion rxn     HPI:  Juvenal feels a bit better.  His abdomen feels less taut.  He urinated a lot yesterday and have multiple bowel movements.  He says his hemorrhoid isn't bothering him this morning.  He feels well overall.     ROS:  10 point ROS negative except as above.     Physical Exam:   /80 (BP Location: Right arm)   Pulse 94   Temp 98.5  F (36.9  C) (Oral)   Resp 18   Ht 1.74 m (5' 8.5\")   Wt 115.3 kg (254 lb 1.6 oz)   SpO2 96%   BMI 38.07 kg/m    General: Conversant. KPS 70; mild jaundice.   Eyes: PERRL   Lungs: CTA bilaterally, decreased breath sounds bilaterally; no wheeze. +shallow breathing due to abdomen  Cardiovascular: RRR, new systolic murmur on exam 6/27.    Abdominal/Rectal: ++protuberant, ++firm to palpation. Not tender.   Lymphatics: 1+ pitting edema bilateral shins; wearing compression stockings.   Skin: no rashes or petechaie   Neuro: A&O   Additional Findings: Jacobs site NT, no drainage.    Pre-treatment ICANS: 10/10.  Patient has writing log with him.  No ICANS done 6/27.       Labs:  Lab Results   Component Value Date    WBC 4.3 06/27/2021    ANEU 4.0 06/27/2021    HGB 7.5 (L) 06/27/2021    HCT 23.2 (L) 06/27/2021    PLT 40 (LL) 06/27/2021     06/27/2021    POTASSIUM 5.0 06/27/2021    CHLORIDE 103 06/27/2021    CO2 27 06/27/2021     (H) 06/27/2021    BUN 34 (H) 06/27/2021    CR 1.33 (H) 06/27/2021    MAG 2.1 06/23/2021    INR 1.17 (H) 06/25/2021    AST 72 (H) 06/25/2021    ALT 90 (H) 06/25/2021       Assessment and Plan:   Juvenal MCGRAW Hayden is a 55 year old male PMH significant for NHL s/p  Bendamustine " + Rituxan then Bendamustine +  Rituxan once again the R-CHOP followed by Nam-NK cell infusion. He is enrolled in protocol [id number], which utilizes Cy/Flu as a lymphodepletion regimen as bridge to Yescarta now admitted from clinic for rituxan  infusion rxn      * Rituxan Infusion rxn, Resolved  Pt had Rituxan infusion on 6/24 as outpatient where he started to have rigors, fever, chest tightness with SOB, and tachycardia; Did not have such response to past rituxan treatments (he is on maintenance rituxan since 2012) Was treated with 2 doses of epinephrine, albuterol inhalation, methylprednisone. Pt was on 3 to 6L of O2 with SpO2 of 94%; since ED admission, by 7 PM on 6/24 pt is hemodynamically stable with O2 at 97% on room air, pulse rate ~90 and Blood pressure around 120s/70s; pt continues to have expiratory wheeze with tachypnea. Albuterol nebulization PRN. Not requiring oxygen.    1.  Lymphoma:  - He had 6 cycles of bendamustine and Rituxan completed in October, 2010. He had 2 years of maintenance Rituxan therapy finishing in September, 2012.  - Second course of bendamustine plus rituximab started February 20, 2017. Cycle 6 was completed on July 11, 2017. Then had maintenance rituximab through January, 2019.  - O-CHOP started at second relapse. Cycle 1 given April 23, 2019. Cycle 6 given August 16, 2019.  [facility]  - Iidelalisib [facility] October 21, 2020 [facility]. 150 mg twice daily.  stopped Idelalisib - last dose was on 6/10 at 8am.   - ; flu/cy 6/23; flu/cy/rituxan 6/24 (rituxan was abbreviated due to reaction); flu/cy 6/25.  Will get cells 6/28.    - On allopurinol. See FEN/Renal below  - PICC placed 6/27.    2.  HEME: Keep hgb >7, plt<10,000  - Needs 1UPRBC today (seems to be needing daily at this point).   - blasts were 8% on 6/25 differential; this is decreasing.  No signs of new diagnosis on 6/8 bone marrow biopsy.   3.  :   Initial radical  "prostatectomy and bilateral lymph node dissection. November 15, 2017. Prostatic adenocarcinoma [place] 4+3 = 7 with tertiary pattern 5. Tumor involves 45% of total surface area. Positive for extensive extraprostatic extension. Positive for bilateral seminal vesicle invasion. Multiple margins positive. Lymphovascular invasion not identified. Perineural invasion was noted. Lymph nodes negative. 3 were examined (2 right obturator and 1 left obturator).    Completed radiation to the prostate fossa and pelvic lymph nodes at [facility] early May 2018. He received 4500 cGy in 25 fractions. He then had 2340 cGy boost in 13 fractions to the prostatic fossa, for a total dose of 6240 cGy in 38 treatment fractions delivered over 54 days. He did not receive hormonal therapy:      4.  ID:   - prophy ACV/fluc; cefepime if spikes (not neutropenic)   C diff colitis: finished vancomycin 6/23, treated for 7 days  COVID negative. RVP + for Rhinovirus 6/11; asymptomatic.    5.  FEN/Renal: Monitor Cr/lytes  - Volume Overload: improved after lasix 40mg IV on 6/26. Significant reduction in weight.  No diuresis today.   - Monitor TLS: uric acid 5.2 Phos elevated to 5.5, K and Cr wnl. Continue allopurinol  Lymphedema consulted    6. Abdominal Pain secondary to cancer. Also now seeing a rise in LFTs, which suspect to be related, stable on admit  Has oxycodone 15 tabs and lorazepam 15 tabs for difficulty sleeping. Aware not to take both together due to sedation    7. CV: Cardiology consulted for SOB, elevated troponin. Per their note: \"Likely Type II MI in setting of demand ischemia from tachycardia, acute on chronic anemia, acute on chronic illness. No evidence of heart failure at this time.\"  Recommend checking lipid profile and outpatient follow up for non-obstructive CAD workup when he is more stable. Also recommended LE U/S to rule out DVTs; this was done and is negative. Cards service ordered follow up as outpatient in 1 month. " "They have signed off.   Troponins are trending down. Fasting lipid panel ordered for 6/27, 4am draw. Echo read pending from 6/25. Note: new murmur noted on today's exam.  No new symptoms.  Awaiting echo read.     Penny Garcia PA-C  6/27/2021   Attending provider note:  Patient has been seen and evaluated by me. I have reviewed today's vital signs, medications, labs and imaging results independently. I have discussed the plan with the team and agree with the findings and plan in this note.      55 years old male with relapsed refractory NHL, planned for , with lymphodepleting chemo: flu/cy 6/23; flu/cy/rituxan 6/24; flu/cy 6/25.    Yesterday, he had a severe reaction to Rituxan, taken to the ED and then admitted (2 doses of epi, albuterol nebs, O2)    Also noted to have EKG changes and a troponin leak- appreciate cardiology input: Likely Type II MI in setting of demand ischemia from tachycardia, acute on chronic anemia, acute on chronic illness. TTE without wall motion abnormality. Would be appropriate to undergo further workup for non-obstructive CAD as an outpatient       He is now feeling better. Got a picc line today. Diuresed well     On exam:  Blood pressure 118/80, pulse 94, temperature 98.5  F (36.9  C), temperature source Oral, resp. rate 18, height 1.74 m (5' 8.5\"), weight 115.3 kg (254 lb 1.6 oz), SpO2 96 %.    HEENT: PERRL, EOMI, MMM  Chest: CTAB  CVS: S1 S2 RRR, no murmurs or gallops  PA: soft, non tended  CNS: non focal    55 years old with relapsed, refractory NHL, planned for   after completing lymphodepleting chemo. He completed Cy/ flu on Friday   Monitor closely.  on Monday    Inga Selby"

## 2021-06-27 NOTE — PLAN OF CARE
"AVSS. (Tmax 99.9).  Lungs diminished, and clear upper airway congestion with cough.  Neb x1 this evening to help.  PICC placed for cells tomorrow. (called cell processing to request around 10am per Sarah Beth)  Still having some stools.  Waiting for imodium to come from pharmacy all day!  Took a nap.  Weight down.  Feet still have some edema and feel like he is walking on \"blisters\"  Back to wearing his home compression socks, and lymphedema ok with it.  Feet maybe a little better this evening.  Nausea intermittently and compazine and ativan PRN.  Continue to monitor.  Anxious and nervious and hoping that nothing happens to prevent him from getting his cells tomorrow.      Problem: Adult Inpatient Plan of Care  Goal: Plan of Care Review  Outcome: Improving  Flowsheets (Taken 6/27/2021 1632)  Plan of Care Reviewed With: patient     "

## 2021-06-27 NOTE — PROVIDER NOTIFICATION
06/27/21 1019   PICC Double Lumen 06/27/21 Right Basilic   Placement Date/Time: 06/27/21 (c) 1019   Catheter Brand: [employer]  Size (Fr): 5 Fr  Lot #: [id number]  Full barrier precautions done: Yes, hand hygiene, sterile gown, sterile gloves, mask, cap, full body drape, chlorhexidine scrub  Consent Signed: Yes  Time...   Site Assessment WDL   External Cath Length (cm) 3 cm   Extremity Circumference (cm) 31 cm   Dressing Intervention Chlorhexidine patch;Transparent;Securing device;New dressing   Dressing Change Due 07/04/21   Purple - Status blood return noted;saline locked   Purple - Cap Change Due 07/01/21   Red - Status blood return noted;saline locked   Red - Cap Change Due 07/01/21   PICC Comment PICC inserted   Extravasation? No   Line Necessity Yes, meets criteria

## 2021-06-27 NOTE — PROGRESS NOTES
06/25/21 1400   Quick Adds   Quick Adds Edema Eval   Living Environment   Current Living Arrangements house   Home Accessibility stairs to enter home;stairs within home   Number of Stairs, Main Entrance 4  (railing)   Stair Railings, Main Entrance railings safe and in good condition   Number of Stairs, Within Home, Primary   (one flgiht to basement)   Transportation Anticipated family or friend will provide   Living Environment Comments Pt is retired   Self-Care   Usual Activity Tolerance excellent   Current Activity Tolerance good   Regular Exercise No   Activity/Exercise/Self-Care Comment Pt enjoys gardening, lawn maintenence   Disability/Function   Hearing Difficulty or Deaf no   Wear Glasses or Blind no   Concentrating, Remembering or Making Decisions Difficulty no   Difficulty Communicating no   Difficulty Eating/Swallowing no   Walking or Climbing Stairs Difficulty no   Dressing/Bathing Difficulty no   Toileting issues no   Doing Errands Independently Difficulty (such as shopping) no   Fall history within last six months no   Change in Functional Status Since Onset of Current Illness/Injury no   General Information   Onset of Illness/Injury or Date of Surgery 06/25/21   Referring Physician Narcisa Stewart, PALudyC   Patient/Family Therapy Goals Statement (PT) stay strong   Pertinent History of Current Problem (include personal factors and/or comorbidities that impact the POC) Pt is a 55 year old male with PMH significant for relapsing NHL admitted from clinic on 6/24/21 for rituxan infusion rxn. At that time, patient was febrile, acutely hypoxic, & complaining of chest pain. Cardiology consulted on 6/25 due to elevated troponin in setting of persistent exertional chest discomfort & subjective dyspnea.    Existing Precautions/Restrictions immunosuppressed   Heart Disease Risk Factors Lack of physical activity;Overweight;Medical history;Gender   General Observations Activity:    Edema General Information   Onset  of Edema   (acute on chronic)   Affected Body Part(s) Right LE;Left LE   Edema Etiology Chemo   Etiology Comments hypervolemia, hypoalbuminemia, decreased activity, steroids   Edema Precautions   (MI)   Edema Precaution Comments monitor plts   Edema Examination/Assessment   Skin Condition Pitting;Intact   Radial Pulse Symmetrical   Dorsal Pedal Pulse Symmetrical   Pitting Assessment Pt has 2+ pitting edema in BLEs from MTPs<>knee crease, most prominent in ankles/dorsum of feet bilaterally   Cognition   Orientation Status (Cognition) oriented x 4   Affect/Mental Status (Cognition) WNL   Follows Commands (Cognition) WNL   Posture    Posture Protracted shoulders;Forward head position   Range of Motion (ROM)   ROM Comment BUE/BLE ROM WFL   Strength   Strength Comments BUE/BLE >3/5   Transfers   Transfer Safety Comments Sit>stand independently   Gait/Stairs (Locomotion)   Comment (Gait/Stairs) Pt ambulates with WBOS, decreased giovanny. Increased SAMS with ambulation.    Sensory Examination   Sensory Perception Comments Numbness on ventral surface of feet bilaterally   Clinical Impression   Criteria for Skilled Therapeutic Intervention yes, treatment indicated   PT Diagnosis (PT) impaired functional mobility   Edema: Patient Presentation Edema   Influenced by the following impairments decreased activity tolerance   Functional limitations due to impairments difficulty with community distance ambulation   Clinical Presentation Stable/Uncomplicated   Clinical Presentation Rationale medical status, level of mobility   Clinical Decision Making (Complexity) low complexity   Therapy Frequency (PT) 4x/week   Predicted Duration of Therapy Intervention (days/wks) 1 week   Planned Therapy Interventions (PT) home exercise program;strengthening;patient/family education  (cardiac rehab)   Edema: Planned Interventions Fit for compression garment;Edema exercises;Education   Risk & Benefits of therapy have been explained  evaluation/treatment results reviewed;care plan/treatment goals reviewed;risks/benefits reviewed;participants voiced agreement with care plan;participants included;patient   PT Discharge Planning    PT Discharge Recommendation (DC Rec) home with outpatient cardiac rehab   PT Rationale for DC Rec Pt is safe to discharge to home with assist when medically appropriate. Pt would benefit from OP cardiac rehab to improve activity tolerance post MI.    PT Brief overview of current status  Independent in room

## 2021-06-27 NOTE — PROCEDURES
[facility]    Double Lumen PICC Placement    Date/Time: 6/27/2021 10:19 AM  Performed by: Tahir Simmons RN  Authorized by: Penny Garcia PA-C   Indications: Cellular product infusion.    UNIVERSAL PROTOCOL   Site Marked: Yes  Prior Images Obtained and Reviewed:  Yes  Required items: Required blood products, implants, devices and special equipment available    Patient identity confirmed:  Verbally with patient, arm band, provided demographic data and hospital-assigned identification number  NA - No sedation, light sedation, or local anesthesia  Confirmation Checklist:  Patient's identity using two indicators, relevant allergies, procedure was appropriate and matched the consent or emergent situation and correct equipment/implants were available  Time out: Immediately prior to the procedure a time out was called    Universal Protocol: the [facility] Universal Protocol was followed    Preparation: Patient was prepped and draped in usual sterile fashion           ANESTHESIA    Anesthesia: See MAR for details  Local Anesthetic:  Lidocaine 1% without epinephrine  Anesthetic Total (mL):  1      SEDATION    Patient Sedated: No        Preparation: skin prepped with ChloraPrep  Skin prep agent: skin prep agent completely dried prior to procedure  Sterile barriers: maximum sterile barriers were used: cap, mask, sterile gown, sterile gloves, and large sterile sheet  Hand hygiene: hand hygiene performed prior to central venous catheter insertion  Type of line used: Power PICC  Catheter type: double lumen  Lumen type: non-valved  Catheter size: 5 Fr  Brand: [employer]  Lot number: [id number]  Placement method: venipuncture, MST, ultrasound and tip confirmation system  Number of attempts: 1  Successful placement: yes  Orientation: right  Location: basilic vein (vein diameter - 0.49 cm)  Arm circumference: adults 10 cm  Extremity circumference: 31  Visible catheter length:  3  Total catheter length: 47  Dressing and securement: glue, chlorhexidine disc applied, sterile dressing applied, statlock and site cleaned  Post procedure assessment: blood return through all ports and free fluid flow (placement verified by [facility] 3[facility])  PROCEDURE   Patient Tolerance:  Patient tolerated the procedure well with no immediate complications  Describe Procedure: PICC tip is in satisfactory location as verified by [employer] [facility] 3[facility] Tip Confirmation System. PICC is OK to use.

## 2021-06-27 NOTE — PLAN OF CARE
AVSS. Continues to have dyspnea on exertion but per pt it has improved since being admitted. Up independent in room. Denied pain, N/V. Voiding well overnight. Multiple BM's, pt did have some complaints of hemorrhoid pain but declined interventions overnight. Per pt warm baths usually are the only intervention that have helped previously. Appetite and oral intake are good. CPAP used overnight and prn ativan given for sleep. No replacements needed. Continue plan of care.         Problem: Adult Inpatient Plan of Care  Goal: Plan of Care Review  Outcome: No Change  Goal: Patient-Specific Goal (Individualized)  Outcome: No Change  Goal: Absence of Hospital-Acquired Illness or Injury  Outcome: No Change  Intervention: Identify and Manage Fall Risk  Recent Flowsheet Documentation  Taken 6/26/2021 2000 by Tasia Goncalves RN  Safety Promotion/Fall Prevention:    assistive device/personal items within reach    clutter free environment maintained    fall prevention program maintained    nonskid shoes/slippers when out of bed  Intervention: Prevent Skin Injury  Recent Flowsheet Documentation  Taken 6/26/2021 2000 by Tasia Goncalves RN  Body Position: position changed independently  Intervention: Prevent and Manage VTE (Venous Thromboembolism) Risk  Recent Flowsheet Documentation  Taken 6/26/2021 2000 by Tasia Goncalves, RN  VTE Prevention/Management: ambulation promoted  Intervention: Prevent Infection  Recent Flowsheet Documentation  Taken 6/26/2021 2000 by Tasia Goncalves, RN  Infection Prevention:    hand hygiene promoted    cohorting utilized    single patient room provided  Goal: Readiness for Transition of Care  Outcome: No Change     Problem: Discharge Planning  Goal: Discharge Planning (Adult, OB, Behavioral, Peds)  Outcome: No Change     Problem: Adjustment to Transplant (Stem Cell/Bone Marrow Transplant)  Goal: Optimal Coping with Transplant  Outcome: No Change     Problem: Diarrhea (Stem Cell/Bone  Marrow Transplant)  Goal: Diarrhea Symptom Control  Outcome: No Change     Problem: Fatigue (Stem Cell/Bone Marrow Transplant)  Goal: Energy Level Supports Daily Activity  Outcome: No Change     Problem: Hematologic Alteration (Stem Cell/Bone Marrow Transplant)  Goal: Blood Counts Within Acceptable Range  Outcome: No Change     Problem: Hypersensitivity Reaction (Stem Cell/Bone Marrow Transplant)  Goal: Absence of Hypersensitivity Reaction  Outcome: No Change     Problem: Infection Risk (Stem Cell/Bone Marrow Transplant)  Goal: Absence of Infection  Outcome: No Change  Intervention: Prevent and Manage Infection  Recent Flowsheet Documentation  Taken 6/26/2021 2000 by Tasia Goncalves RN  Infection Prevention:    hand hygiene promoted    cohorting utilized    single patient room provided  Isolation Precautions:    cytotoxic precautions maintained    enteric precautions maintained     Problem: Mucositis (Stem Cell/Bone Marrow Transplant)  Goal: Mucous Membrane Health and Integrity  Outcome: No Change  Intervention: Maintain Oral Mucous Membrane Integrity  Recent Flowsheet Documentation  Taken 6/26/2021 2000 by Tasia Goncalves, RN  Oral Care: oral rinse provided     Problem: Nausea and Vomiting (Stem Cell/Bone Marrow Transplant)  Goal: Nausea and Vomiting Symptom Relief  Outcome: No Change     Problem: Nutrition Intake Altered (Stem Cell/Bone Marrow Transplant)  Goal: Optimal Nutrition Intake  Outcome: No Change

## 2021-06-27 NOTE — PHARMACY-ADMISSION MEDICATION HISTORY
Admission Medication History Completed by Pharmacy    See [facility] Admission Navigator for allergy information, preferred outpatient pharmacy, prior to admission medications and immunization status.     Medication History Sources:     Pharmacist medication history in BMT clinic, see note 6/9/21    Chart review    Changes made to PTA medication list (reason):    Added: None    Deleted: Vancomycin- pt completed 10 day course    Changed: None    Additional Information:    Patient was prescribed Vancomycin oral on 6/13 for a 10 day course. This was completed day prior/of admission.      Prior to Admission medications    Medication Sig Last Dose Taking? Auth Provider   allopurinol (ZYLOPRIM) 300 MG tablet Take 1 tablet (300 mg) by mouth daily 6/24/2021 at Unknown time Yes Rosa Terry MD   LORazepam (ATIVAN) 0.5 MG tablet Take 1 tablet (0.5 mg) by mouth nightly as needed for anxiety or sleep 6/24/2021 at Unknown time Yes Rosenda Bentley MD   medical cannabis (Patient's own supply)  Past Week at Unknown time Yes Reported, Patient   omeprazole (PRILOSEC) 20 MG DR capsule Take 1 capsule (20 mg) by mouth daily 6/24/2021 at Unknown time Yes Irma Bethea PA-C   prochlorperazine (COMPAZINE) 5 MG tablet Take 1 tablet (5 mg) by mouth every 6 hours as needed for nausea or vomiting 6/24/2021 at Unknown time Yes Rosenda Bentley MD   Psyllium (METAMUCIL FIBER) 51.7 % PACK Take 1 packet by mouth daily  6/24/2021 at Unknown time Yes Reported, Patient   tolterodine ER (DETROL LA) 4 MG 24 hr capsule Take 4 mg by mouth 6/24/2021 at Unknown time Yes Reported, Patient   azelastine (ASTELIN) 0.1 % nasal spray USE 1 SPRAY IN EACH NOSTRIL TWICE DAILY AS DIRECTED Unknown at Unknown time  Reported, Patient   fluticasone (FLONASE) 50 MCG/ACT nasal spray SHAKE LIQUID AND USE 2 SPRAYS IN EACH NOSTRIL EVERY DAY Unknown at Unknown time  Reported, Patient   hydrOXYzine (ATARAX) 50 MG tablet Take 1 tablet (50 mg) by mouth 3  times daily as needed for itching Unknown at Unknown time  Irma Bethea PA-C   loperamide (IMODIUM A-D) 2 MG tablet Take 1 tablet (2 mg) by mouth 4 times daily as needed for diarrhea Unknown at Unknown time  Rosa Terry MD   oxyCODONE (OXY-IR) 5 MG capsule Take 1 capsule (5 mg) by mouth every 12 hours as needed for severe pain   Rosenda Bentley MD       Date completed: 06/27/21    Medication history completed by: Rachell Grant [facility]

## 2021-06-28 ENCOUNTER — RESEARCH ENCOUNTER (OUTPATIENT)
Dept: TRANSPLANT | Facility: CLINIC | Age: 56
End: 2021-06-28

## 2021-06-28 ENCOUNTER — TELEPHONE (OUTPATIENT)
Dept: ONCOLOGY | Facility: CLINIC | Age: 56
End: 2021-06-28

## 2021-06-28 VITALS
TEMPERATURE: 98.1 F | WEIGHT: 240.3 LBS | DIASTOLIC BLOOD PRESSURE: 63 MMHG | SYSTOLIC BLOOD PRESSURE: 103 MMHG | RESPIRATION RATE: 18 BRPM | BODY MASS INDEX: 35.59 KG/M2 | HEART RATE: 75 BPM | OXYGEN SATURATION: 98 % | HEIGHT: 69 IN

## 2021-06-28 LAB
ABO + RH BLD: NORMAL
ABO + RH BLD: NORMAL
ALBUMIN SERPL-MCNC: 2.4 G/DL (ref 3.4–5)
ALBUMIN SERPL-MCNC: 2.4 G/DL (ref 3.4–5)
ALBUMIN UR-MCNC: NEGATIVE MG/DL
ALP SERPL-CCNC: 212 U/L (ref 40–150)
ALP SERPL-CCNC: 216 U/L (ref 40–150)
ALT SERPL W P-5'-P-CCNC: 70 U/L (ref 0–70)
ALT SERPL W P-5'-P-CCNC: 71 U/L (ref 0–70)
ANION GAP SERPL CALCULATED.3IONS-SCNC: 4 MMOL/L (ref 3–14)
ANION GAP SERPL CALCULATED.3IONS-SCNC: 4 MMOL/L (ref 3–14)
ANISOCYTOSIS BLD QL SMEAR: ABNORMAL
ANISOCYTOSIS BLD QL SMEAR: SLIGHT
APPEARANCE UR: CLEAR
AST SERPL W P-5'-P-CCNC: 39 U/L (ref 0–45)
AST SERPL W P-5'-P-CCNC: 43 U/L (ref 0–45)
BACTERIA #/AREA URNS HPF: ABNORMAL /HPF
BASOPHILS # BLD AUTO: 0 10E9/L (ref 0–0.2)
BASOPHILS # BLD AUTO: 0 10E9/L (ref 0–0.2)
BASOPHILS NFR BLD AUTO: 0 %
BASOPHILS NFR BLD AUTO: 0 %
BILIRUB DIRECT SERPL-MCNC: 0.7 MG/DL (ref 0–0.2)
BILIRUB DIRECT SERPL-MCNC: 0.9 MG/DL (ref 0–0.2)
BILIRUB SERPL-MCNC: 1.5 MG/DL (ref 0.2–1.3)
BILIRUB SERPL-MCNC: 1.5 MG/DL (ref 0.2–1.3)
BILIRUB UR QL STRIP: NEGATIVE
BLD GP AB SCN SERPL QL: NORMAL
BLD PROD TYP BPU: NORMAL
BLOOD BANK CMNT PATIENT-IMP: NORMAL
BUN SERPL-MCNC: 24 MG/DL (ref 7–30)
BUN SERPL-MCNC: 25 MG/DL (ref 7–30)
CALCIUM SERPL-MCNC: 9.1 MG/DL (ref 8.5–10.1)
CALCIUM SERPL-MCNC: 9.1 MG/DL (ref 8.5–10.1)
CHLORIDE SERPL-SCNC: 102 MMOL/L (ref 94–109)
CHLORIDE SERPL-SCNC: 102 MMOL/L (ref 94–109)
CO2 SERPL-SCNC: 28 MMOL/L (ref 20–32)
CO2 SERPL-SCNC: 28 MMOL/L (ref 20–32)
COLOR UR AUTO: ABNORMAL
CREAT SERPL-MCNC: 1.16 MG/DL (ref 0.66–1.25)
CREAT SERPL-MCNC: 1.19 MG/DL (ref 0.66–1.25)
CRP SERPL-MCNC: 88 MG/L (ref 0–8)
DIFFERENTIAL METHOD BLD: ABNORMAL
DIFFERENTIAL METHOD BLD: ABNORMAL
EOSINOPHIL # BLD AUTO: 0 10E9/L (ref 0–0.7)
EOSINOPHIL # BLD AUTO: 0.1 10E9/L (ref 0–0.7)
EOSINOPHIL NFR BLD AUTO: 0.9 %
EOSINOPHIL NFR BLD AUTO: 3.5 %
ERYTHROCYTE [DISTWIDTH] IN BLOOD BY AUTOMATED COUNT: 16 % (ref 10–15)
ERYTHROCYTE [DISTWIDTH] IN BLOOD BY AUTOMATED COUNT: 16.1 % (ref 10–15)
FERRITIN SERPL-MCNC: 644 NG/ML (ref 26–388)
GFR SERPL CREATININE-BSD FRML MDRD: 67 ML/MIN/{1.73_M2}
GFR SERPL CREATININE-BSD FRML MDRD: 69 ML/MIN/{1.73_M2}
GLUCOSE SERPL-MCNC: 121 MG/DL (ref 70–99)
GLUCOSE SERPL-MCNC: 141 MG/DL (ref 70–99)
GLUCOSE UR STRIP-MCNC: NEGATIVE MG/DL
HCT VFR BLD AUTO: 22.9 % (ref 40–53)
HCT VFR BLD AUTO: 23.6 % (ref 40–53)
HGB BLD-MCNC: 7.3 G/DL (ref 13.3–17.7)
HGB BLD-MCNC: 7.4 G/DL (ref 13.3–17.7)
HGB UR QL STRIP: NEGATIVE
INR PPP: 1.07 (ref 0.86–1.14)
KETONES UR STRIP-MCNC: NEGATIVE MG/DL
LDH SERPL L TO P-CCNC: 178 U/L (ref 85–227)
LEUKOCYTE ESTERASE UR QL STRIP: NEGATIVE
LYMPHOCYTES # BLD AUTO: 0.1 10E9/L (ref 0.8–5.3)
LYMPHOCYTES # BLD AUTO: 0.1 10E9/L (ref 0.8–5.3)
LYMPHOCYTES NFR BLD AUTO: 1.8 %
LYMPHOCYTES NFR BLD AUTO: 3.5 %
MAGNESIUM SERPL-MCNC: 2 MG/DL (ref 1.6–2.3)
MAGNESIUM SERPL-MCNC: 2.1 MG/DL (ref 1.6–2.3)
MCH RBC QN AUTO: 28.5 PG (ref 26.5–33)
MCH RBC QN AUTO: 28.6 PG (ref 26.5–33)
MCHC RBC AUTO-ENTMCNC: 31.4 G/DL (ref 31.5–36.5)
MCHC RBC AUTO-ENTMCNC: 31.9 G/DL (ref 31.5–36.5)
MCV RBC AUTO: 90 FL (ref 78–100)
MCV RBC AUTO: 91 FL (ref 78–100)
MICROCYTES BLD QL SMEAR: PRESENT
MONOCYTES # BLD AUTO: 0 10E9/L (ref 0–1.3)
MONOCYTES # BLD AUTO: 0 10E9/L (ref 0–1.3)
MONOCYTES NFR BLD AUTO: 0.9 %
MONOCYTES NFR BLD AUTO: 0.9 %
NEUTROPHILS # BLD AUTO: 3.1 10E9/L (ref 1.6–8.3)
NEUTROPHILS # BLD AUTO: 3.2 10E9/L (ref 1.6–8.3)
NEUTROPHILS NFR BLD AUTO: 92.1 %
NEUTROPHILS NFR BLD AUTO: 96.4 %
NITRATE UR QL: NEGATIVE
NUM BPU REQUESTED: 2
OVALOCYTES BLD QL SMEAR: SLIGHT
PH UR STRIP: 5.5 PH (ref 5–7)
PHOSPHATE SERPL-MCNC: 4 MG/DL (ref 2.5–4.5)
PHOSPHATE SERPL-MCNC: 4.3 MG/DL (ref 2.5–4.5)
PLATELET # BLD AUTO: 28 10E9/L (ref 150–450)
PLATELET # BLD AUTO: 31 10E9/L (ref 150–450)
PLATELET # BLD EST: ABNORMAL 10*3/UL
PLATELET # BLD EST: ABNORMAL 10*3/UL
POTASSIUM SERPL-SCNC: 4.4 MMOL/L (ref 3.4–5.3)
POTASSIUM SERPL-SCNC: 4.5 MMOL/L (ref 3.4–5.3)
PROT SERPL-MCNC: 5.2 G/DL (ref 6.8–8.8)
PROT SERPL-MCNC: 5.4 G/DL (ref 6.8–8.8)
RBC # BLD AUTO: 2.55 10E12/L (ref 4.4–5.9)
RBC # BLD AUTO: 2.6 10E12/L (ref 4.4–5.9)
RBC #/AREA URNS AUTO: 0 /HPF (ref 0–2)
SODIUM SERPL-SCNC: 134 MMOL/L (ref 133–144)
SODIUM SERPL-SCNC: 134 MMOL/L (ref 133–144)
SOURCE: ABNORMAL
SP GR UR STRIP: 1.01 (ref 1–1.03)
SPECIMEN EXP DATE BLD: NORMAL
SQUAMOUS #/AREA URNS AUTO: 0 /HPF (ref 0–1)
UROBILINOGEN UR STRIP-MCNC: NORMAL MG/DL (ref 0–2)
WBC # BLD AUTO: 3.3 10E9/L (ref 4–11)
WBC # BLD AUTO: 3.4 10E9/L (ref 4–11)
WBC #/AREA URNS AUTO: <1 /HPF (ref 0–5)

## 2021-06-28 PROCEDURE — 86850 RBC ANTIBODY SCREEN: CPT | Performed by: STUDENT IN AN ORGANIZED HEALTH CARE EDUCATION/TRAINING PROGRAM

## 2021-06-28 PROCEDURE — 250N000013 HC RX MED GY IP 250 OP 250 PS 637: Performed by: INTERNAL MEDICINE

## 2021-06-28 PROCEDURE — 86900 BLOOD TYPING SEROLOGIC ABO: CPT | Performed by: STUDENT IN AN ORGANIZED HEALTH CARE EDUCATION/TRAINING PROGRAM

## 2021-06-28 PROCEDURE — 80053 COMPREHEN METABOLIC PANEL: CPT

## 2021-06-28 PROCEDURE — 250N000013 HC RX MED GY IP 250 OP 250 PS 637

## 2021-06-28 PROCEDURE — 85025 COMPLETE CBC W/AUTO DIFF WBC: CPT

## 2021-06-28 PROCEDURE — 86923 COMPATIBILITY TEST ELECTRIC: CPT | Performed by: STUDENT IN AN ORGANIZED HEALTH CARE EDUCATION/TRAINING PROGRAM

## 2021-06-28 PROCEDURE — 80076 HEPATIC FUNCTION PANEL: CPT | Performed by: STUDENT IN AN ORGANIZED HEALTH CARE EDUCATION/TRAINING PROGRAM

## 2021-06-28 PROCEDURE — 83735 ASSAY OF MAGNESIUM: CPT

## 2021-06-28 PROCEDURE — 250N000013 HC RX MED GY IP 250 OP 250 PS 637: Performed by: STUDENT IN AN ORGANIZED HEALTH CARE EDUCATION/TRAINING PROGRAM

## 2021-06-28 PROCEDURE — 99238 HOSP IP/OBS DSCHRG MGMT 30/<: CPT | Performed by: INTERNAL MEDICINE

## 2021-06-28 PROCEDURE — 83735 ASSAY OF MAGNESIUM: CPT | Performed by: STUDENT IN AN ORGANIZED HEALTH CARE EDUCATION/TRAINING PROGRAM

## 2021-06-28 PROCEDURE — 86901 BLOOD TYPING SEROLOGIC RH(D): CPT | Performed by: STUDENT IN AN ORGANIZED HEALTH CARE EDUCATION/TRAINING PROGRAM

## 2021-06-28 PROCEDURE — 99001 SPECIMEN HANDLING PT-LAB: CPT | Performed by: PHYSICIAN ASSISTANT

## 2021-06-28 PROCEDURE — 999N001121 HC STATISTIC RESEARCH KIT COLLECTION: Performed by: PHYSICIAN ASSISTANT

## 2021-06-28 PROCEDURE — 80048 BASIC METABOLIC PNL TOTAL CA: CPT | Performed by: STUDENT IN AN ORGANIZED HEALTH CARE EDUCATION/TRAINING PROGRAM

## 2021-06-28 PROCEDURE — 82248 BILIRUBIN DIRECT: CPT

## 2021-06-28 PROCEDURE — 81001 URINALYSIS AUTO W/SCOPE: CPT

## 2021-06-28 PROCEDURE — 85025 COMPLETE CBC W/AUTO DIFF WBC: CPT | Performed by: STUDENT IN AN ORGANIZED HEALTH CARE EDUCATION/TRAINING PROGRAM

## 2021-06-28 PROCEDURE — 84100 ASSAY OF PHOSPHORUS: CPT

## 2021-06-28 PROCEDURE — 999N001121 HC STATISTIC RESEARCH KIT COLLECTION

## 2021-06-28 PROCEDURE — 250N000011 HC RX IP 250 OP 636: Performed by: INTERNAL MEDICINE

## 2021-06-28 PROCEDURE — 84100 ASSAY OF PHOSPHORUS: CPT | Performed by: STUDENT IN AN ORGANIZED HEALTH CARE EDUCATION/TRAINING PROGRAM

## 2021-06-28 PROCEDURE — 85610 PROTHROMBIN TIME: CPT | Performed by: STUDENT IN AN ORGANIZED HEALTH CARE EDUCATION/TRAINING PROGRAM

## 2021-06-28 PROCEDURE — 258N000003 HC RX IP 258 OP 636

## 2021-06-28 PROCEDURE — 82728 ASSAY OF FERRITIN: CPT

## 2021-06-28 PROCEDURE — 86140 C-REACTIVE PROTEIN: CPT

## 2021-06-28 PROCEDURE — 83615 LACTATE (LD) (LDH) ENZYME: CPT

## 2021-06-28 RX ORDER — MEPERIDINE HYDROCHLORIDE 25 MG/ML
25 INJECTION INTRAMUSCULAR; INTRAVENOUS; SUBCUTANEOUS EVERY 30 MIN PRN
Status: DISCONTINUED | OUTPATIENT
Start: 2021-06-28 | End: 2021-06-28 | Stop reason: HOSPADM

## 2021-06-28 RX ORDER — MEPERIDINE HYDROCHLORIDE 25 MG/ML
25-50 INJECTION INTRAMUSCULAR; INTRAVENOUS; SUBCUTANEOUS
Status: DISCONTINUED | OUTPATIENT
Start: 2021-06-28 | End: 2021-06-28 | Stop reason: HOSPADM

## 2021-06-28 RX ORDER — ACETAMINOPHEN 325 MG/1
650 TABLET ORAL EVERY 4 HOURS
Status: DISCONTINUED | OUTPATIENT
Start: 2021-06-28 | End: 2021-06-28

## 2021-06-28 RX ORDER — HEPARIN SODIUM (PORCINE) LOCK FLUSH IV SOLN 100 UNIT/ML 100 UNIT/ML
5 SOLUTION INTRAVENOUS
Status: CANCELLED | OUTPATIENT
Start: 2021-06-28

## 2021-06-28 RX ORDER — DIPHENHYDRAMINE HYDROCHLORIDE 50 MG/ML
50 INJECTION INTRAMUSCULAR; INTRAVENOUS
Status: DISCONTINUED | OUTPATIENT
Start: 2021-06-28 | End: 2021-06-28 | Stop reason: HOSPADM

## 2021-06-28 RX ORDER — ACYCLOVIR 800 MG/1
800 TABLET ORAL EVERY 12 HOURS
Qty: 60 TABLET | Refills: 3 | Status: SHIPPED | OUTPATIENT
Start: 2021-06-28 | End: 2021-07-26

## 2021-06-28 RX ORDER — ALBUTEROL SULFATE 90 UG/1
1-2 AEROSOL, METERED RESPIRATORY (INHALATION)
Status: DISCONTINUED | OUTPATIENT
Start: 2021-06-28 | End: 2021-06-28 | Stop reason: HOSPADM

## 2021-06-28 RX ORDER — NALOXONE HYDROCHLORIDE 0.4 MG/ML
0.2 INJECTION, SOLUTION INTRAMUSCULAR; INTRAVENOUS; SUBCUTANEOUS
Status: DISCONTINUED | OUTPATIENT
Start: 2021-06-28 | End: 2021-06-28

## 2021-06-28 RX ORDER — ACETAMINOPHEN 325 MG/1
650 TABLET ORAL ONCE
Status: DISCONTINUED | OUTPATIENT
Start: 2021-06-28 | End: 2021-06-28 | Stop reason: HOSPADM

## 2021-06-28 RX ORDER — DIPHENHYDRAMINE HCL 25 MG
25 CAPSULE ORAL EVERY 4 HOURS
Status: COMPLETED | OUTPATIENT
Start: 2021-06-28 | End: 2021-06-28

## 2021-06-28 RX ORDER — FLUCONAZOLE 100 MG/1
100 TABLET ORAL DAILY
Qty: 30 TABLET | Refills: 1 | Status: SHIPPED | OUTPATIENT
Start: 2021-06-29 | End: 2021-07-15

## 2021-06-28 RX ORDER — HEPARIN SODIUM,PORCINE 10 UNIT/ML
5 VIAL (ML) INTRAVENOUS
Status: CANCELLED | OUTPATIENT
Start: 2021-06-28

## 2021-06-28 RX ORDER — METHYLPREDNISOLONE SODIUM SUCCINATE 125 MG/2ML
125 INJECTION, POWDER, LYOPHILIZED, FOR SOLUTION INTRAMUSCULAR; INTRAVENOUS
Status: DISCONTINUED | OUTPATIENT
Start: 2021-06-28 | End: 2021-06-28 | Stop reason: HOSPADM

## 2021-06-28 RX ORDER — HEPARIN SODIUM (PORCINE) LOCK FLUSH IV SOLN 100 UNIT/ML 100 UNIT/ML
5 SOLUTION INTRAVENOUS
Status: CANCELLED | OUTPATIENT
Start: 2021-06-29

## 2021-06-28 RX ORDER — HEPARIN SODIUM,PORCINE 10 UNIT/ML
5 VIAL (ML) INTRAVENOUS
Status: CANCELLED | OUTPATIENT
Start: 2021-06-29

## 2021-06-28 RX ORDER — ALBUTEROL SULFATE 5 MG/ML
2.5 SOLUTION RESPIRATORY (INHALATION)
Status: DISCONTINUED | OUTPATIENT
Start: 2021-06-28 | End: 2021-06-28 | Stop reason: HOSPADM

## 2021-06-28 RX ORDER — DIPHENHYDRAMINE HCL 25 MG
25 CAPSULE ORAL ONCE
Status: DISCONTINUED | OUTPATIENT
Start: 2021-06-28 | End: 2021-06-28 | Stop reason: HOSPADM

## 2021-06-28 RX ORDER — DIPHENHYDRAMINE HCL 25 MG
25 CAPSULE ORAL EVERY 4 HOURS
Status: DISCONTINUED | OUTPATIENT
Start: 2021-06-28 | End: 2021-06-28

## 2021-06-28 RX ORDER — EPINEPHRINE 1 MG/ML
0.3 INJECTION, SOLUTION, CONCENTRATE INTRAVENOUS EVERY 5 MIN PRN
Status: DISCONTINUED | OUTPATIENT
Start: 2021-06-28 | End: 2021-06-28 | Stop reason: HOSPADM

## 2021-06-28 RX ORDER — ACETAMINOPHEN 325 MG/1
650 TABLET ORAL EVERY 4 HOURS
Status: COMPLETED | OUTPATIENT
Start: 2021-06-28 | End: 2021-06-28

## 2021-06-28 RX ADMIN — Medication 5 ML: at 12:03

## 2021-06-28 RX ADMIN — ACETAMINOPHEN 650 MG: 325 TABLET, FILM COATED ORAL at 13:50

## 2021-06-28 RX ADMIN — PROCHLORPERAZINE MALEATE 5 MG: 5 TABLET ORAL at 03:42

## 2021-06-28 RX ADMIN — ACYCLOVIR 800 MG: 200 CAPSULE ORAL at 09:10

## 2021-06-28 RX ADMIN — OMEPRAZOLE 20 MG: 20 CAPSULE, DELAYED RELEASE ORAL at 09:10

## 2021-06-28 RX ADMIN — SODIUM CHLORIDE 250 ML: 9 INJECTION, SOLUTION INTRAVENOUS at 12:03

## 2021-06-28 RX ADMIN — DIPHENHYDRAMINE HYDROCHLORIDE 25 MG: 25 CAPSULE ORAL at 09:48

## 2021-06-28 RX ADMIN — Medication 1 CAPSULE: at 09:49

## 2021-06-28 RX ADMIN — FLUCONAZOLE 100 MG: 100 TABLET ORAL at 09:10

## 2021-06-28 RX ADMIN — TOLTERODINE TARTRATE 4 MG: 4 CAPSULE, EXTENDED RELEASE ORAL at 09:10

## 2021-06-28 RX ADMIN — SODIUM CHLORIDE 250 ML: 9 INJECTION, SOLUTION INTRAVENOUS at 09:46

## 2021-06-28 RX ADMIN — ACETAMINOPHEN 650 MG: 325 TABLET, FILM COATED ORAL at 09:49

## 2021-06-28 RX ADMIN — DIPHENHYDRAMINE HYDROCHLORIDE 25 MG: 25 CAPSULE ORAL at 13:50

## 2021-06-28 RX ADMIN — ALLOPURINOL 300 MG: 300 TABLET ORAL at 09:10

## 2021-06-28 RX ADMIN — PROCHLORPERAZINE MALEATE 10 MG: 5 TABLET ORAL at 09:49

## 2021-06-28 ASSESSMENT — ACTIVITIES OF DAILY LIVING (ADL)
ADLS_ACUITY_SCORE: 10

## 2021-06-28 ASSESSMENT — MIFFLIN-ST. JEOR: SCORE: 1897.49

## 2021-06-28 NOTE — PROGRESS NOTES
BMT Teaching Flowsheet    Juvenal Blake is a 55 year old male  The primary encounter diagnosis was Follicular lymphoma grade i, lymph nodes of multiple sites (H). Diagnoses of Anaphylactoid reaction, initial encounter, Stable angina pectoris (H), Anaphylactic shock, due to adverse effect of correct medicinal substance properly administered, initial encounter, Wheezing, Shortness of breath, Chest pain, unspecified type, Adverse effect of antineoplastic antibiotic, initial encounter, Encounter for screening laboratory testing for severe acute respiratory syndrome coronavirus 2 (SARS-CoV-2), and Follicular lymphoma grade I, unspecified body region (H) were also pertinent to this visit.    Teaching Topic: discharge post NK cell infusion    Person(s) involved in teaching: Patient and Spouse  Motivation Level  Asks Questions: Yes  Eager to Learn: Yes  Cooperative: Yes  Receptive (willing/able to accept information): Yes  Any cultural factors/Worship beliefs that may influence understanding or compliance? No    Patient and Caregiver demonstrates understanding of the following:  - Reason for the appointment, diagnosis and treatment plan: Yes  - Knowledge of proper use of medications and conditions for which they are ordered (with special attention to potential side effects or drug interactions): Yes  - Which situations necessitate calling provider and whom to contact: Yes    Teaching concerns addressed: none identified     Proper use and care of (medical equipment, care aids, etc.) Yes  Pain management techniques: Yes  Patient instructed on hand hygiene: Yes  How and/when to access community resources: Yes    Infection Control:  Patient and Caregiver demonstrates understanding of the following:  Surgical procedure site care taught NA  Signs and symptoms of infection taught Yes  Wound care taught NA  Central venous catheter care taught Yes    Instructional Materials Used/Given: Discharge teaching completed with patient  and family. Written guidelines provided including clinic follow up, signs and symptoms to report, infection prevention, and contact list. Discussed activities to avoid to prevent infections and mask guidelines. Pt and family verbalize understanding of material via teach back and all questions have been answered.      Time spent with patient: 20 minutes.    Specific Concerns: IGNACIA Johnson, RN BSN  RN Care Coordinator - Inpatient BMT Unit 5C  Phone [phone]  Pager [phone]

## 2021-06-28 NOTE — PROGRESS NOTES
5C Phase 1 patient     Here for study drug:  NK Infusion    Administered: IV     Type of line used: Right PICC.    Location of injection: RLQ     Premeds given: tylenol 650 mg and benadryl 25mg  Premeds given at: 0949    Post meds: tylenol 650mg and benadryl 25 mg  Post meds given at: 1349    Fluids: 250mL before and 250mL after  Fluids given at: 5916-3204.   Fluids stopped at: 1330    EKGs: NA    Labs: Done    Tolerated/side effects: tolerated. No s/e  Interventions for side effects: n/a    Meet discharge criteria: yes  Discharged at: 1500    _________________________________________________    Status during observation/monitoring hours: VSS. Pt was alert and oriented. Rested in his bed most of the afternoon.

## 2021-06-28 NOTE — PROGRESS NOTES
BMT/Cellular Allogeneic Product Infusion       Patient Vitals for the past 24 hrs:   Temp Temp src Pulse Resp BP   06/27/21 1212 98  F (36.7  C) Oral 89 16 117/57   06/27/21 1626 99.7  F (37.6  C) Oral 89 18 121/59   06/27/21 1628 99.9  F (37.7  C) Axillary -- -- --   06/27/21 2053 99.4  F (37.4  C) Oral 90 16 125/69   06/27/21 2318 98.4  F (36.9  C) Oral 94 16 134/78   06/28/21 0344 98.3  F (36.8  C) Oral 99 20 132/71   06/28/21 0744 98.4  F (36.9  C) Oral 83 18 114/69         BMT INFUSION DOCUMENTATION (last 48 hours)      BMT/Cellular Product Infusion     Row Name                  Product 06/28/21 0815 Other (specify in comment)    Product Details Product Release Date: 06/28/21  -NB Product Release Time: 0815  -LL Product Type: Other (specify in comment)  -NB DIN: [id number]  -NB Product Description Code: [id number]  -NB Volume Dispensed (mL): 28 mL  -NB    Checked by (Patient RN)  --       Checked by (Witness)  --       Product Volume Infused (mL)  --       Flush Volume (mL)  --       Volume Dispensed (mL)  --          Product 06/28/21 0815 Other (specify in comment)    Product Details Product Release Date: 06/28/21  -NB Product Release Time: 0815  -LL Product Type: Other (specify in comment)  -NB DIN: [id number]  -NB Product Description Code: [id number]  -NB Volume Dispensed (mL): 28 mL  -NB    Checked by (Patient RN)  --       Checked by (Witness)  --       Product Volume Infused (mL)  --       Flush Volume (mL)  --       Volume Dispensed (mL)  --          Product 06/28/21 0815 Other (specify in comment)    Product Details Product Release Date: 06/28/21  -NB Product Release Time: 0815  -LL Product Type: Other (specify in comment)  -NB DIN: [id number]  -NB Product Description Code: [id number]  -NB Volume Dispensed (mL): 28 mL  -NB    Checked by (Patient RN)  --       Checked by (Witness)  --       Product Volume Infused (mL)  --       Flush Volume (mL)  --       Volume Dispensed (mL)  --         User  Key  (r) = Recorded By, (t) = Taken By, (c) = Cosigned By    Initials Name Effective Dates    LL Jeannie Tan 01/12/21 -     NB NatalioDimple reynoso 01/12/21 -         Allogeneic Donor Eligibility Determination and Summary of Records: Eligible        Type of Infusion: Allogeneic      Baseline Pre-Infusion Evaluation (to be completed by Provider):   Dyspnea: Grade 0 - none  Hypoxia: Grade 0 - not present  Fever: Grade 0 - afebrile  Chills: Grade 0 - none  Febrile Neutropenia: Grade 0 - not present  Sinus Bradycardia: Grade 0 - none  Hypertension: Grade 0 - none  Hypotension: Grade 0 - none  Chest Pain: Grade 0 - none  Bronchospasm: Grade 0 - none  Pain: Grade 0 - none  Rash: Grade 0 - None  Neurologic Specify: none    If adverse reactions, events or complications occur (fever greater than 2 degrees fahrenheit increase, and severe reactions of the following types: chills, dyspnea, bronchospasm, hyper/hypotension, hypoxia, bradycardia, chest pain, back/flank pain, hypoxia, and any other reaction deemed severe or life threatening; any instance of product bag breakage or unusual product appearance)    Any other events that are >= grade 3, then immediately contact the BMT Attending physician, the Cell Therapy Laboratory Medical Director (pager [phone]) and the Cell Therapy Laboratory ([phone]).  After midnight, holidays & weekends contact the [facility] on the appropriate campus ([facility] [place]: [phone]; [facility] [place]: [phone]).    Tasia Liu PA-C

## 2021-06-28 NOTE — PLAN OF CARE
PT 5C: Cancel - Pt politely declining therapy today despite encouragement/education on benefits of exercise during admission and recent MI. Pt agreeable to check back this PM if schedule allows.

## 2021-06-28 NOTE — PROGRESS NOTES
"BMT Daily Progress  Note   June 27, 2021    Juvenal Blake is a 55 year old male PMH significant for NHL s/p  Bendamustine + Rituxan then Bendamustine +  Rituxan once again the R-CHOP followed by Nam-NK cell infusion. He is enrolled in protocol [id number], which utilizes Cy/Flu as a lymphodepletion regimen and  NK infusion.  Admitted from clinic for rituxan (biosimilar) infusion rxn     HPI:  Juvenal again feels a bit better.  His abdomen feels less taut.  He has less swelling of his b/l LE.   He feels well overall.  He wants to 'get going' with the cellular infusion.    ROS:  10 point ROS negative except as above.     Physical Exam:   /69 (BP Location: Left arm)   Pulse 83   Temp 98.4  F (36.9  C) (Oral)   Resp 18   Ht 1.74 m (5' 8.5\")   Wt 109 kg (240 lb 4.8 oz)   SpO2 98%   BMI 36.00 kg/m    General: Conversant. ECOG 1  Eyes: PERRL   Lungs: CTA bilaterally, decreased breath sounds bilaterally; no wheeze.   Cardiovascular: RRR  Abdominal/Rectal: ++protuberant, +firm to palpation. Not tender.  (pt tells me it is less firm than prior)  Lymphatics: trace-1+ pedal edema  Skin: no rash; scattered ecchymosis  Neuro: A&O/ CN intact (II-XII); romberg/gait intact.  Gross sensory/motor e/a b/l.    Additional Findings: ecchymosis at newly placed PICC; portacath c/d/i     ICANS: 10/10 (6/28).  Patient has writing log with him      Labs:  Lab Results   Component Value Date    WBC 3.3 (L) 06/28/2021    ANEU 3.2 06/28/2021    HGB 7.4 (L) 06/28/2021    HCT 23.6 (L) 06/28/2021    PLT 31 (LL) 06/28/2021     06/28/2021    POTASSIUM 4.5 06/28/2021    CHLORIDE 102 06/28/2021    CO2 28 06/28/2021     (H) 06/28/2021    BUN 24 06/28/2021    CR 1.16 06/28/2021    MAG 2.1 06/28/2021    INR 1.07 06/28/2021    AST 43 06/28/2021    ALT 70 06/28/2021       Assessment and Plan:   Juvenal MCGRAW Gabrielchristiano is a 55 year old male PMH significant for NHL s/p  Bendamustine + Rituxan then Bendamustine +  Rituxan once again the " R-CHOP followed by Nam-NK cell infusion. He is enrolled in protocol [id number], which utilizes Cy/Flu as a lymphodepletion regimen and  NK infusion.  Admitted from clinic for rituxan (biosimilar) infusion rxn      * Rituxan Infusion rxn, Resolved  Pt had Rituxan infusion on 6/24 as outpatient where he started to have rigors, fever, chest tightness with SOB, and tachycardia; Did not have such response to past rituxan treatments (he is on maintenance rituxan since 2012) Was treated with 2 doses of epinephrine, albuterol inhalation, methylprednisone. Pt was on 3 to 6L of O2 with SpO2 of 94%; since ED admission, by 7 PM on 6/24 pt is hemodynamically stable with O2 at 97% on room air, pulse rate ~90 and Blood pressure around 120s/70s; pt continues to have expiratory wheeze with tachypnea. Albuterol nebulization PRN. Not requiring oxygen.    1.  Lymphoma:  - He had 6 cycles of bendamustine and Rituxan completed in October, 2010. He had 2 years of maintenance Rituxan therapy finishing in September, 2012.  - Second course of bendamustine plus rituximab started February 20, 2017. Cycle 6 was completed on July 11, 2017. Then had maintenance rituximab through January, 2019.  - O-CHOP started at second relapse. Cycle 1 given April 23, 2019. Cycle 6 given August 16, 2019.  Haplo NAM-NK therapy at [facility] September 1, 2020  - Iidelalisib started October 21, 2020 at [facility]. 150 mg twice daily.  stopped Idelalisib - last dose was on 6/10 at 8am.   - ; flu/cy 6/23; flu/cy/rituxan 6/24 (rituxan was abbreviated due to reaction); flu/cy 6/25.    - On allopurinol. See FEN/Renal below  - PICC placed 6/27.  -  cell infusion today (6/28)    2.  HEME: Keep hgb >7g/dL, plt<10,000  - blasts were 8% on 6/25 differential; this is decreasing.  No signs of new diagnosis on 6/8 bone marrow biopsy.     3.  :   Initial radical prostatectomy and bilateral lymph node dissection. November 15, 2017. Prostatic adenocarcinoma  "[place] 4+3 = 7 with tertiary pattern 5. Tumor involves 45% of total surface area. Positive for extensive extraprostatic extension. Positive for bilateral seminal vesicle invasion. Multiple margins positive. Lymphovascular invasion not identified. Perineural invasion was noted. Lymph nodes negative. 3 were examined (2 right obturator and 1 left obturator).    Completed radiation to the prostate fossa and pelvic lymph nodes at [facility] early May 2018. He received 4500 cGy in 25 fractions. He then had 2340 cGy boost in 13 fractions to the prostatic fossa, for a total dose of 6240 cGy in 38 treatment fractions delivered over 54 days. He did not receive hormonal therapy:      4.  ID:   - prophy ACV/fluc; cefepime if spikes (not neutropenic)   C diff colitis: finished vancomycin 6/23, treated for 7 days  COVID negative. RVP + for Rhinovirus 6/11; asymptomatic.    5.  FEN/Renal: Monitor Cr/lytes  - Volume Overload: improved after lasix 40mg IV on 6/26. Significant reduction in weight.  No diuresis today.   - Monitor TLS: uric acid 5.2 Phos elevated to 5.5 (WNL today), K and Cr wnl. Continue allopurinol  - Lymphedema consulted while inpt    6. Abdominal Pain secondary to cancer. Also now seeing a rise in LFTs, which suspect to be related, stable on admit  Has oxycodone 15 tabs and lorazepam 15 tabs for difficulty sleeping. Aware not to take both together due to sedation    7. CV: Cardiology consulted for SOB, elevated troponin. Per their note: \"Likely Type II MI in setting of demand ischemia from tachycardia, acute on chronic anemia, acute on chronic illness. No evidence of heart failure at this time.\"  Recommend checking lipid profile and outpatient follow up for non-obstructive CAD workup when he is more stable. Also recommended LE U/S to rule out DVTs; this was done and is negative. Cards service ordered follow up as outpatient in 1 month. They have signed off.   Troponins are trending down. Fasting lipid " "panel ordered for 6/27, 4am draw. Echo 6/25 (murmur noted on 6/27): EF 55-60%, NL ventricular function.      Dispo:  Discharge later today w/ f/u in clinic as planned 6/29 (11:30 lab, noon provider) and 7/1    Tasia Liu pa-c  [phone]      Attending provider note:  Patient has been seen and evaluated by me. I have reviewed today's vital signs, medications, labs and imaging results independently. I have discussed the plan with the team and agree with the findings and plan in this note.      55 years old male with relapsed refractory NHL, planned for , with lymphodepleting chemo: flu/cy 6/23; flu/cy/rituxan 6/24; flu/cy 6/25.    Yesterday, he had a severe reaction to Rituxan, taken to the ED and then admitted (2 doses of epi, albuterol nebs, O2)    Also noted to have EKG changes and a troponin leak- appreciate cardiology input: Likely Type II MI in setting of demand ischemia from tachycardia, acute on chronic anemia, acute on chronic illness. TTE without wall motion abnormality. Would be appropriate to undergo further workup for non-obstructive CAD as an outpatient       He is now feeling better.  today    On exam:  Blood pressure 114/69, pulse 83, temperature 98.4  F (36.9  C), temperature source Oral, resp. rate 18, height 1.74 m (5' 8.5\"), weight 109 kg (240 lb 4.8 oz), SpO2 98 %.    HEENT: PERRL, EOMI, MMM  Chest: CTAB  CVS: S1 S2 RRR, no murmurs or gallops  PA: soft, non tended  CNS: non focal    55 years old with relapsed, refractory NHL,  today. IL2 post  He will be discharged once IL2 is complete    Inga Selby               "

## 2021-06-28 NOTE — PLAN OF CARE
AVSS. Up independent in room. Complained of a headache and PICC site soreness, PRN tylenol given with noted relief. Also complained of some nausea, PRN ativan and compazine given x 1. Pt continues to have SOB on exertion but per pt is at baseline for him. BM x 3 overnight, no complaints of hemorrhoid pain. No voiding issues. Appetitie and oral intake are good. No replacements needed overnight. Compression stockings in place for BLE edema. Plan for the day is to receive cell infusion. Continue plan of care.   Problem: Adult Inpatient Plan of Care  Goal: Plan of Care Review  Outcome: No Change  Goal: Absence of Hospital-Acquired Illness or Injury  Outcome: No Change  Intervention: Identify and Manage Fall Risk  Recent Flowsheet Documentation  Taken 6/27/2021 2100 by Tasia Goncalves RN  Safety Promotion/Fall Prevention:    assistive device/personal items within reach    clutter free environment maintained    chemotherapeutic precautions    lighting adjusted    nonskid shoes/slippers when out of bed    patient and family education  Intervention: Prevent Skin Injury  Recent Flowsheet Documentation  Taken 6/27/2021 2100 by Tasia Goncalves RN  Body Position: position changed independently  Intervention: Prevent and Manage VTE (Venous Thromboembolism) Risk  Recent Flowsheet Documentation  Taken 6/27/2021 2100 by Tasia Goncalves RN  VTE Prevention/Management:    ambulation promoted    fluids promoted  Intervention: Prevent Infection  Recent Flowsheet Documentation  Taken 6/27/2021 2100 by Tasia Goncalves RN  Infection Prevention:    cohorting utilized    hand hygiene promoted  Goal: Optimal Comfort and Wellbeing  Outcome: No Change  Goal: Readiness for Transition of Care  Outcome: No Change     Problem: Discharge Planning  Goal: Discharge Planning (Adult, OB, Behavioral, Peds)  Outcome: No Change     Problem: Adjustment to Transplant (Stem Cell/Bone Marrow Transplant)  Goal: Optimal Coping with  Transplant  Outcome: No Change     Problem: Diarrhea (Stem Cell/Bone Marrow Transplant)  Goal: Diarrhea Symptom Control  Outcome: No Change     Problem: Fatigue (Stem Cell/Bone Marrow Transplant)  Goal: Energy Level Supports Daily Activity  Outcome: No Change     Problem: Hematologic Alteration (Stem Cell/Bone Marrow Transplant)  Goal: Blood Counts Within Acceptable Range  Outcome: No Change     Problem: Hypersensitivity Reaction (Stem Cell/Bone Marrow Transplant)  Goal: Absence of Hypersensitivity Reaction  Outcome: No Change     Problem: Infection Risk (Stem Cell/Bone Marrow Transplant)  Goal: Absence of Infection  Outcome: No Change  Intervention: Prevent and Manage Infection  Recent Flowsheet Documentation  Taken 6/27/2021 2100 by Tasia Goncalves RN  Infection Prevention:    cohorting utilized    hand hygiene promoted  Isolation Precautions:    cytotoxic precautions maintained    enteric precautions maintained     Problem: Mucositis (Stem Cell/Bone Marrow Transplant)  Goal: Mucous Membrane Health and Integrity  Outcome: No Change     Problem: Nausea and Vomiting (Stem Cell/Bone Marrow Transplant)  Goal: Nausea and Vomiting Symptom Relief  Outcome: No Change     Problem: Nutrition Intake Altered (Stem Cell/Bone Marrow Transplant)  Goal: Optimal Nutrition Intake  Outcome: No Change

## 2021-06-28 NOTE — PROGRESS NOTES
Care Coordination  Discharge Planning    Line Company: Line Company:  [facility] [phone] for line care supplies   Referral made for line care:  Yes    IV medications needed at discharge:  None   Pharmacy concerns: none    PT/OT/therapies recommended: OP PT recommended.   Referral made for PT/OT/therapies: No, patient declined. Educated on importance of continuing HEP and notifying OP RNCC if patient changes his mind.    D/c location: Home to [place]  Has placement need been communicated to Social Work?  Yes     NC Teaching time arranged with patient/caregiver: Completed today at 12:30 with patient and wife Nancy  Notify nurse to schedule line care class/ DM teaching, prior to d/c: Patient and caregiver previously received teaching, and decline further need    Caregiver: Nancy (wife) [phone]      Rachell Johnson, RN BSN  RN Care Coordinator - Inpatient BMT Unit 5C  Phone [phone]  Pager [phone]

## 2021-06-28 NOTE — SUMMARY OF CARE
BMT Summary of Care    June 28, 2021 10:33 AM  Juvenal Blake  MRN: [id number]    Discharge Date: 6/28/2021    BMT Primary Physician: Dr. Terry    Discharge Diagnosis: S/P readmission for anaphylactoid reaction to biosimilar rituxan    Discharge To: home    Activity: as tolerated    Catheter Care: PICC - Flush each line with 5mL of 10 unit/mL heparin every 24 hours    Vascular Access Device Protocol Per Policy  Supplies through home infusion  [facility]  Fax: [phone]  Ph: [phone]       IV Medications through home infusion: None    Nutrition: Regular diet as tolerated    Blood Transfusions:  Transfuse if Hemoglobin < or equal 7 g/dL  Red Blood Cell Order: 10 units, irradiated and leukoreduced   Transfuse if Platelet count < or equal 10 uL  Platelet order: 1 adult dose, irradiated and leukoreduced  Transfusion Pre-meds:  None    Intravenous Electrolyte Replacement:  Potassium  chloride (give only if serum creatinine < 2)     3-3.3  20mEq/hr  over 1 hour x 2 doses     <3      20mEq/hr  over 1 hour x 3 doses  Magnesium sulfate 1.3-1.7     2 grams over 1 hour x1 dose                                     <1.3         2 grams over 2 hours x1 dose      Laboratory Tests:  At next clinic appointment (date: 6/29/2021)  Hemogram (CBC) differential, platelet count  Basic Metabolic Panel  Phosphorus    Support Services:  None    Appointments:   6/29 (11:30 lab, noon provider) and 7/1    Tasia Liu PA-C      BMT Contact Information:-   For issues including fevers 100.5 or more:  Please call during the week: Monday through Friday between hours of 8:00 am and 4:30 pm- Call BMT office- [phone]  After hours/Weekends: Please call [facility] : [phone] and ask for BMT physician on call and the  will have the BMT physician call you back.

## 2021-06-28 NOTE — SUMMARY OF CARE
Juvenal Blake   Home Medication Instructions JONNY:[id number]    Printed on:06/28/21 3247   Medication Information                      acyclovir (ZOVIRAX) 800 MG tablet  Take 1 tablet (800 mg) by mouth every 12 hours             allopurinol (ZYLOPRIM) 300 MG tablet  Take 1 tablet (300 mg) by mouth daily             azelastine (ASTELIN) 0.1 % nasal spray  USE 1 SPRAY IN EACH NOSTRIL TWICE DAILY AS DIRECTED             fluconazole (DIFLUCAN) 100 MG tablet  Take 1 tablet (100 mg) by mouth daily             fluticasone (FLONASE) 50 MCG/ACT nasal spray  SHAKE LIQUID AND USE 2 SPRAYS IN EACH NOSTRIL EVERY DAY             hydrOXYzine (ATARAX) 50 MG tablet  Take 1 tablet (50 mg) by mouth 3 times daily as needed for itching             loperamide (IMODIUM A-D) 2 MG tablet  Take 1 tablet (2 mg) by mouth 4 times daily as needed for diarrhea             LORazepam (ATIVAN) 0.5 MG tablet  Take 1 tablet (0.5 mg) by mouth nightly as needed for anxiety or sleep             medical cannabis (Patient's own supply)               omeprazole (PRILOSEC) 20 MG DR capsule  Take 1 capsule (20 mg) by mouth daily             oxyCODONE (OXY-IR) 5 MG capsule  Take 1 capsule (5 mg) by mouth every 12 hours as needed for severe pain             prochlorperazine (COMPAZINE) 5 MG tablet  Take 1 tablet (5 mg) by mouth every 6 hours as needed for nausea or vomiting             Psyllium (METAMUCIL FIBER) 51.7 % PACK  Take 1 packet by mouth daily              tolterodine ER (DETROL LA) 4 MG 24 hr capsule  Take 4 mg by mouth

## 2021-06-28 NOTE — DISCHARGE SUMMARY
[facility] Discharge Summary   Juvenal Blake MRN# [id number]   Age: 55 year old  YOB: 1965   Date of Admission: 6/24/2021  Date of Discharge:  6/28/2021  Admitting Physician: Inga Selby MD  Discharge Physician: Inga Selby MD  Primary MD: Cole Sandoval    Discharge Diagnoses:    1. Hx of NHL  2. Anaphylactoid reaction to rituxan   3. Pancytopenia (from disease/treatment)  4. Hyperphosphatemia  5. edema  6. Fluid overload  7. Recent c. Diff infection  8. Hypertriglyceridemia  9. LFT abnormality (presumably disease related)  10.  ALEJANDRO, resolved    Discharge Medications:       Juvenal Blake   Home Medication Instructions JONNY:[id number]    Printed on:06/28/21 1027   Medication Information                      acyclovir (ZOVIRAX) 800 MG tablet  Take 1 tablet (800 mg) by mouth every 12 hours             allopurinol (ZYLOPRIM) 300 MG tablet  Take 1 tablet (300 mg) by mouth daily             azelastine (ASTELIN) 0.1 % nasal spray  USE 1 SPRAY IN EACH NOSTRIL TWICE DAILY AS DIRECTED             fluconazole (DIFLUCAN) 100 MG tablet  Take 1 tablet (100 mg) by mouth daily             fluticasone (FLONASE) 50 MCG/ACT nasal spray  SHAKE LIQUID AND USE 2 SPRAYS IN EACH NOSTRIL EVERY DAY             hydrOXYzine (ATARAX) 50 MG tablet  Take 1 tablet (50 mg) by mouth 3 times daily as needed for itching             loperamide (IMODIUM A-D) 2 MG tablet  Take 1 tablet (2 mg) by mouth 4 times daily as needed for diarrhea             LORazepam (ATIVAN) 0.5 MG tablet  Take 1 tablet (0.5 mg) by mouth nightly as needed for anxiety or sleep             medical cannabis (Patient's own supply)               omeprazole (PRILOSEC) 20 MG DR capsule  Take 1 capsule (20 mg) by mouth daily             oxyCODONE (OXY-IR) 5 MG capsule  Take 1 capsule (5 mg) by mouth every 12 hours as needed for severe pain             prochlorperazine (COMPAZINE) 5 MG tablet  Take 1 tablet (5 mg) by mouth every 6 hours as needed for nausea  or vomiting             Psyllium (METAMUCIL FIBER) 51.7 % PACK  Take 1 packet by mouth daily              tolterodine ER (DETROL LA) 4 MG 24 hr capsule  Take 4 mg by mouth                 Brief History of Illness:    Juvenal Blake is a 55 year old male PMH significant for NHL s/p  Bendamustine + Rituxan then Bendamustine +  Rituxan once again the R-CHOP followed by Nam-NK cell infusion. He is enrolled in protocol [id number], which utilizes Cy/Flu as a lymphodepletion regimen and  NK infusion.  Admitted from clinic for rituxan (biosimilar) infusion rxn      Pt Rituxan infusion on 6/24 as outpatient where he started to have rigors, fever, chest tightness with SOB, and tachycardia; he did not lose consciousness or had hives or palpitation; Did not have such response to past rituxan treatments (he is on maintenance rituxan since 2012) although he notes having mild SOB and chills during each treatment; He was immediately treated at the site and at the [facility] ED and has since been slowly feeling better; He did not have any new complaints before he started the infusion this morning.  He recently (6/23) completed a course of vancomycin for C diff infection.     ED course  Patient initially was with tachypneic and tachycardic, hypotensive received 1 L bolus albuterol neb and 125 mg methylprednisolone.  Now improving hemodynamics patient admitted for further observation     For the patient's family history, social history, past medical and surgical history, bone marrow transplant workup, admission medications, hospital admission review of systems and physical exam, please refer to hospital admission H&P dated 6/24/2021.     Hospital Course:    Juvenal Blake is a 55 year old male PMH significant for NHL s/p  Bendamustine + Rituxan then Bendamustine +  Rituxan once again the R-CHOP followed by Nam-NK cell infusion. He is enrolled in protocol EM8986-07, which utilizes Cy/Flu as a lymphodepletion regimen and  NK  infusion.  Admitted from clinic for rituxan (biosimilar) infusion rxn      * Rituxan Infusion rxn, Resolved  Pt had Rituxan infusion on 6/24 as outpatient where he started to have rigors, fever, chest tightness with SOB, and tachycardia; Did not have such response to past rituxan treatments (he is on maintenance rituxan since 2012) Was treated with 2 doses of epinephrine, albuterol inhalation, methylprednisone. Pt was on 3 to 6L of O2 with SpO2 of 94%; since ED admission, by 7 PM on 6/24 pt is hemodynamically stable with O2 at 97% on room air, pulse rate ~90 and Blood pressure around 120s/70s; pt continues to have expiratory wheeze with tachypnea. Albuterol nebulization PRN. Not requiring oxygen.    1.  Lymphoma:  - He had 6 cycles of bendamustine and Rituxan completed in October, 2010. He had 2 years of maintenance Rituxan therapy finishing in September, 2012.  - Second course of bendamustine plus rituximab started February 20, 2017. Cycle 6 was completed on July 11, 2017. Then had maintenance rituximab through January, 2019.  - O-CHOP started at second relapse. Cycle 1 given April 23, 2019. Cycle 6 given August 16, 2019.  Haplo NAM-NK therapy at [facility] September 1, 2020  - Iidelalisib started October 21, 2020 at [facility]. 150 mg twice daily.  stopped Idelalisib - last dose was on 6/10 at 8am.   - ; flu/cy 6/23; flu/cy/rituxan 6/24 (rituxan was abbreviated due to reaction); flu/cy 6/25.    - On allopurinol. See FEN/Renal below  - PICC placed 6/27.  -  NK cell infusion today (6/28)    2.  HEME: Keep hgb >7g/dL, plt<10,000  - Needs 1UPRBC today (seems to be needing daily at this point).   - blasts were 8% on 6/25 differential; this is decreasing.  No signs of new diagnosis on 6/8 bone marrow biopsy.     3.  :   Initial radical prostatectomy and bilateral lymph node dissection. November 15, 2017. Prostatic adenocarcinoma Maricel 4+3 = 7 with tertiary pattern 5. Tumor involves 45% of total surface  "area. Positive for extensive extraprostatic extension. Positive for bilateral seminal vesicle invasion. Multiple margins positive. Lymphovascular invasion not identified. Perineural invasion was noted. Lymph nodes negative. 3 were examined (2 right obturator and 1 left obturator).    Completed radiation to the prostate fossa and pelvic lymph nodes at [facility] early May 2018. He received 4500 cGy in 25 fractions. He then had 2340 cGy boost in 13 fractions to the prostatic fossa, for a total dose of 6240 cGy in 38 treatment fractions delivered over 54 days. He did not receive hormonal therapy:      4.  ID:   - prophy ACV/fluc; cefepime if spikes (not neutropenic)   C diff colitis: finished vancomycin 6/23, treated for 7 days  COVID negative. RVP + for Rhinovirus 6/11; asymptomatic.    5.  FEN/Renal: Monitor Cr/lytes  - Volume Overload: improved after lasix 40mg IV on 6/26. Significant reduction in weight.  No diuresis today.   - Monitor TLS: uric acid 5.2 Phos elevated to 5.5 (WNL today), K and Cr wnl. Continue allopurinol  - Lymphedema consulted while inpt    6. Abdominal Pain secondary to cancer. Also now seeing a rise in LFTs, which suspect to be related, stable on admit  Has oxycodone 15 tabs and lorazepam 15 tabs for difficulty sleeping. Aware not to take both together due to sedation    7. CV: Cardiology consulted for SOB, elevated troponin. Per their note: \"Likely Type II MI in setting of demand ischemia from tachycardia, acute on chronic anemia, acute on chronic illness. No evidence of heart failure at this time.\"  Recommend checking lipid profile and outpatient follow up for non-obstructive CAD workup when he is more stable. Also recommended LE U/S to rule out DVTs; this was done and is negative. Cards service ordered follow up as outpatient in 1 month. They have signed off.   Troponins are trending down. Fasting lipid panel ordered for 6/27, 4am draw. Echo 6/25 (murmur noted on 6/27): EF 55-60%, NL " ventricular function.    Discharge Instructions and Follow-Up:    Discharge diet: Regular diet as tolerated  Discharge activity: Activity as tolerated   Discharge follow-up: Follow up with BMT Clinic as follows:  6/29 (11:30 lab, noon provider) and 7/1    Discharge Disposition:    Discharged to home in chronic stable condition.    Patient has been seen and evaluated by me. I have reviewed today's vital signs, medications, labs and imaging results independently. I have discussed the plan with the team and agree with the findings and plan in this note.    Inga Selby MD

## 2021-06-28 NOTE — PROGRESS NOTES
BMT SOCIAL WORK DISCHARGE NOTE:    Focus: Discharge Plan    Data: Juvenal Blake is a 55 year old male PMH significant for NHL admitted for FATE 516 infusion.    Interventions: Per Med Team, pt is medically stable for discharge today. SW attempted to meet with pt to assess coping, provide psychosocial support. Pt was packed and about to discharge home. Pt states he is looking forward to discharging home and that he was admitted over the weekend unplanned. Pt denied questions or concerns at this time. SW encouraged pt to contact SW for support, questions and/or resources.     Education Provided: Discharge Resource Packet, Caregiver Self-Care Education, and Expected Emotional Responses to Transition Home.    Assessment: Pt presented as pleasant and eager to discharge home.  Pt appears to be coping appropriately. Pt continues to be supported by his wife.    Plan: Pt to discharge Home ([place]) with his wife (Nancy) as primary caregiver. SW will continue to provide psychosocial support and assistance with resources as needed. SW will continue to collaborate with multidisciplinary team regarding pt's plan of care.     CITLALLI Cunningham, EMIL  Adult Blood & Marrow Transplant   Phone: [phone]  Pager: [phone]

## 2021-06-28 NOTE — PROGRESS NOTES
Pt discharged to: Home  Via: Self transport  Accompanied by: wife  Belongings: sent with patient  Teaching: done per unit routine  Clinic appointment: Tomorrow @ 11:30 AM

## 2021-06-28 NOTE — PROGRESS NOTES
SPIRITUAL HEALTH SERVICES  [facility] 5C  REFERRAL SOURCE: LOS     Pt in bed, spouse Nancy at bedside. Introduced spiritual health services. Pt eyes lids were closing on and off with tiredness. No needs were expressed at this time. Instructed how to request SH visit through their nurse.    PLAN:  remains available per pt/family/staff request.     Rev. Glory Arango MDiv, [facility]  Staff    Pager [phone]  * SHS remains available 24/7 for emergent requests/referrals, either by having the switchboard page the on-call  or by entering an ASAP/STAT consult in Epic (this will also page the on-call ).*

## 2021-06-28 NOTE — PROGRESS NOTES
[id number] (FATE 516 Lymphoma): Study Visit Note   Subject name: Juvenal Blake     Visit: Cycle 1 Day 1     Did the study visit occur within the appropriate window allowed by the protocol? yes    Since the last study visit, He has been doing Juvenal Endorses that he feels better over the last few days. He is breathing easier and is able to get around better. He is eager to receive his first 516 infusion today.      I have personally interviewed Juvenal Blake and reviewed his medical record for adverse events and concomitant medications and these have been recorded on the corresponding logs in Juvenal Blake's research file.     Juvenal Blake was given the opportunity to ask any trial related questions.  Please see provider progress note for physical exam and other clinical information. Labs were reviewed - any significant lab values were addressed and reviewed.    Patient was given ICANS form. Discussed potential infusion and IL-2 side effects. Informed Juvenal to take tylenol and benadryl tonight around 1800 and 2200. He and his wife stated understanding and denied any questions.      Emilia Castillo RN

## 2021-06-29 ENCOUNTER — CARE COORDINATION (OUTPATIENT)
Dept: ONCOLOGY | Facility: CLINIC | Age: 56
End: 2021-06-29

## 2021-06-29 ENCOUNTER — INFUSION THERAPY VISIT (OUTPATIENT)
Dept: TRANSPLANT | Facility: CLINIC | Age: 56
End: 2021-06-29
Payer: COMMERCIAL

## 2021-06-29 ENCOUNTER — APPOINTMENT (OUTPATIENT)
Dept: LAB | Facility: CLINIC | Age: 56
End: 2021-06-29
Attending: PHYSICIAN ASSISTANT
Payer: COMMERCIAL

## 2021-06-29 ENCOUNTER — ONCOLOGY VISIT (OUTPATIENT)
Dept: TRANSPLANT | Facility: CLINIC | Age: 56
End: 2021-06-29
Attending: PHYSICIAN ASSISTANT
Payer: COMMERCIAL

## 2021-06-29 VITALS
OXYGEN SATURATION: 100 % | BODY MASS INDEX: 36.6 KG/M2 | SYSTOLIC BLOOD PRESSURE: 116 MMHG | WEIGHT: 244.3 LBS | HEART RATE: 81 BPM | RESPIRATION RATE: 16 BRPM | DIASTOLIC BLOOD PRESSURE: 76 MMHG | TEMPERATURE: 98.1 F

## 2021-06-29 VITALS
SYSTOLIC BLOOD PRESSURE: 111 MMHG | OXYGEN SATURATION: 96 % | DIASTOLIC BLOOD PRESSURE: 71 MMHG | TEMPERATURE: 99 F | RESPIRATION RATE: 16 BRPM | HEART RATE: 77 BPM

## 2021-06-29 DIAGNOSIS — C82.08 FOLLICULAR LYMPHOMA GRADE I, LYMPH NODES OF MULTIPLE SITES (H): Primary | ICD-10-CM

## 2021-06-29 DIAGNOSIS — Z85.72 HISTORY OF LYMPHOMA: ICD-10-CM

## 2021-06-29 DIAGNOSIS — R52 PAIN: ICD-10-CM

## 2021-06-29 LAB
ALBUMIN SERPL-MCNC: 2.4 G/DL (ref 3.4–5)
ALP SERPL-CCNC: 201 U/L (ref 40–150)
ALT SERPL W P-5'-P-CCNC: 61 U/L (ref 0–70)
ANION GAP SERPL CALCULATED.3IONS-SCNC: 9 MMOL/L (ref 3–14)
ANISOCYTOSIS BLD QL SMEAR: SLIGHT
AST SERPL W P-5'-P-CCNC: 32 U/L (ref 0–45)
BASOPHILS # BLD AUTO: 0 10E9/L (ref 0–0.2)
BASOPHILS NFR BLD AUTO: 1.8 %
BILIRUB DIRECT SERPL-MCNC: 0.8 MG/DL (ref 0–0.2)
BILIRUB SERPL-MCNC: 1.4 MG/DL (ref 0.2–1.3)
BLD PROD TYP BPU: NORMAL
BLD PROD TYP BPU: NORMAL
BLD UNIT ID BPU: 0
BLD UNIT ID BPU: 0
BLOOD PRODUCT CODE: NORMAL
BLOOD PRODUCT CODE: NORMAL
BPU ID: NORMAL
BPU ID: NORMAL
BUN SERPL-MCNC: 18 MG/DL (ref 7–30)
CALCIUM SERPL-MCNC: 8.7 MG/DL (ref 8.5–10.1)
CHLORIDE SERPL-SCNC: 99 MMOL/L (ref 94–109)
CO2 SERPL-SCNC: 26 MMOL/L (ref 20–32)
CREAT SERPL-MCNC: 1.21 MG/DL (ref 0.66–1.25)
DACRYOCYTES BLD QL SMEAR: SLIGHT
DIFFERENTIAL METHOD BLD: ABNORMAL
EOSINOPHIL # BLD AUTO: 0.1 10E9/L (ref 0–0.7)
EOSINOPHIL NFR BLD AUTO: 7.1 %
ERYTHROCYTE [DISTWIDTH] IN BLOOD BY AUTOMATED COUNT: 15.9 % (ref 10–15)
GFR SERPL CREATININE-BSD FRML MDRD: 66 ML/MIN/{1.73_M2}
GLUCOSE SERPL-MCNC: 117 MG/DL (ref 70–99)
HCT VFR BLD AUTO: 20.4 % (ref 40–53)
HGB BLD-MCNC: 6.4 G/DL (ref 13.3–17.7)
LDH SERPL L TO P-CCNC: 173 U/L (ref 85–227)
LYMPHOCYTES # BLD AUTO: 0.1 10E9/L (ref 0.8–5.3)
LYMPHOCYTES NFR BLD AUTO: 8 %
MAGNESIUM SERPL-MCNC: 2.1 MG/DL (ref 1.6–2.3)
MCH RBC QN AUTO: 28.3 PG (ref 26.5–33)
MCHC RBC AUTO-ENTMCNC: 31.4 G/DL (ref 31.5–36.5)
MCV RBC AUTO: 90 FL (ref 78–100)
MONOCYTES # BLD AUTO: 0 10E9/L (ref 0–1.3)
MONOCYTES NFR BLD AUTO: 0 %
NEUTROPHILS # BLD AUTO: 1.2 10E9/L (ref 1.6–8.3)
NEUTROPHILS NFR BLD AUTO: 83.1 %
PHOSPHATE SERPL-MCNC: 3.6 MG/DL (ref 2.5–4.5)
PLATELET # BLD AUTO: 19 10E9/L (ref 150–450)
PLATELET # BLD EST: ABNORMAL 10*3/UL
POIKILOCYTOSIS BLD QL SMEAR: SLIGHT
POTASSIUM SERPL-SCNC: 4.4 MMOL/L (ref 3.4–5.3)
PROT SERPL-MCNC: 5.4 G/DL (ref 6.8–8.8)
RBC # BLD AUTO: 2.26 10E12/L (ref 4.4–5.9)
SODIUM SERPL-SCNC: 134 MMOL/L (ref 133–144)
TRANSFUSION STATUS PATIENT QL: NORMAL
URATE SERPL-MCNC: 3.3 MG/DL (ref 3.5–7.2)
WBC # BLD AUTO: 1.4 10E9/L (ref 4–11)

## 2021-06-29 PROCEDURE — 36430 TRANSFUSION BLD/BLD COMPNT: CPT

## 2021-06-29 PROCEDURE — 84100 ASSAY OF PHOSPHORUS: CPT

## 2021-06-29 PROCEDURE — G0463 HOSPITAL OUTPT CLINIC VISIT: HCPCS

## 2021-06-29 PROCEDURE — 84550 ASSAY OF BLOOD/URIC ACID: CPT

## 2021-06-29 PROCEDURE — 85025 COMPLETE CBC W/AUTO DIFF WBC: CPT

## 2021-06-29 PROCEDURE — 83615 LACTATE (LD) (LDH) ENZYME: CPT

## 2021-06-29 PROCEDURE — 83735 ASSAY OF MAGNESIUM: CPT

## 2021-06-29 PROCEDURE — 96374 THER/PROPH/DIAG INJ IV PUSH: CPT

## 2021-06-29 PROCEDURE — 80053 COMPREHEN METABOLIC PANEL: CPT

## 2021-06-29 PROCEDURE — 250N000011 HC RX IP 250 OP 636: Performed by: PHYSICIAN ASSISTANT

## 2021-06-29 PROCEDURE — 999N001121 HC STATISTIC RESEARCH KIT COLLECTION

## 2021-06-29 PROCEDURE — P9040 RBC LEUKOREDUCED IRRADIATED: HCPCS | Performed by: STUDENT IN AN ORGANIZED HEALTH CARE EDUCATION/TRAINING PROGRAM

## 2021-06-29 PROCEDURE — 82248 BILIRUBIN DIRECT: CPT

## 2021-06-29 PROCEDURE — 99215 OFFICE O/P EST HI 40 MIN: CPT

## 2021-06-29 RX ORDER — LEVOFLOXACIN 250 MG/1
250 TABLET, FILM COATED ORAL DAILY
Qty: 30 TABLET | Refills: 0 | Status: SHIPPED | OUTPATIENT
Start: 2021-06-29 | End: 2021-07-15

## 2021-06-29 RX ORDER — HEPARIN SODIUM,PORCINE 10 UNIT/ML
5 VIAL (ML) INTRAVENOUS ONCE
Status: COMPLETED | OUTPATIENT
Start: 2021-06-29 | End: 2021-06-29

## 2021-06-29 RX ORDER — FUROSEMIDE 10 MG/ML
20 INJECTION INTRAMUSCULAR; INTRAVENOUS ONCE
Status: COMPLETED | OUTPATIENT
Start: 2021-06-29 | End: 2021-06-29

## 2021-06-29 RX ORDER — OXYCODONE HYDROCHLORIDE 5 MG/1
5 CAPSULE ORAL EVERY 12 HOURS PRN
Qty: 15 CAPSULE | Refills: 0 | Status: SHIPPED | OUTPATIENT
Start: 2021-06-29 | End: 2021-08-17

## 2021-06-29 RX ADMIN — Medication 5 ML: at 11:49

## 2021-06-29 RX ADMIN — FUROSEMIDE 20 MG: 10 INJECTION, SOLUTION INTRAMUSCULAR; INTRAVENOUS at 14:15

## 2021-06-29 ASSESSMENT — PAIN SCALES - GENERAL: PAINLEVEL: NO PAIN (1)

## 2021-06-29 NOTE — LETTER
6/29/2021         RE: Juvenal Blake  [address] [place] [address]        Dear Colleague,    Thank you for referring your patient, Juvenal Blake, to the [facility] [place]. Please see a copy of my visit note below.    BMT Daily Progress  Note      Juvenal Blake is a 55 year old male PMH significant for refractory NHL, undergoing  NK infusion. Admitted with rituxan (biosimilar) infusion reaction 6/24 and remained admitted through completion of lymphodepleting chemo and  NK cell infusion.  Currently day +2     HPI: Discharged from hospital yesterday following  infusion and IL2. Had a fever for 104 following IL2 injection which came down with tylenol. He was asymptomatic with this. No recurrence of fevers off Tylenol. Had good urine output with lasix while admitted but weight is back up 4 lbs today. Abdominal pain is overall stable but still has pain when driving over bumps. Breathing is comfortable. Had a headache last night but this has resolved. Small amount of erythema around IL2 injection site but no pain.      ROS:  10 point ROS negative except as above.      Physical Exam:   /76 (BP Location: Left arm, Patient Position: Sitting, Cuff Size: Adult Large)   Pulse 81   Temp 98.1  F (36.7  C) (Oral)   Resp 16   Wt 110.8 kg (244 lb 4.8 oz)   SpO2 100%   BMI 36.60 kg/m      Wt Readings from Last 5 Encounters:   06/29/21 110.8 kg (244 lb 4.8 oz)   06/28/21 109 kg (240 lb 4.8 oz)   06/24/21 116.4 kg (256 lb 11.2 oz)   06/23/21 115.8 kg (255 lb 3.2 oz)   06/16/21 116.5 kg (256 lb 14.4 oz)     General: Conversant. ECOG 1  Eyes: PERRL, appears mildly icteric    Lungs: CTA bilaterally, decreased breath sounds bilaterally; no wheeze.   Cardiovascular: RRR  Abdominal/Rectal: ++protuberant, +firm to palpation. Not tender.  BS present.   Lymphatics: 1+ pedal edema  Skin: no rash; scattered ecchymosis  Neuro: A&O/ CN intact (II-XII); ICANS  10/10  Additional Findings: PICC; portacath c/d/i      ICANS: 10/10 (6/29).  Patient has writing log with him        Labs:  Lab Results   Component Value Date    WBC 1.4 (L) 06/29/2021    ANEU 3.1 06/28/2021    HGB 6.4 (LL) 06/29/2021    HCT 20.4 (L) 06/29/2021    PLT 19 (LL) 06/29/2021     06/29/2021    POTASSIUM 4.4 06/29/2021    CHLORIDE 99 06/29/2021    CO2 26 06/29/2021     (H) 06/29/2021    BUN 18 06/29/2021    CR 1.21 06/29/2021    MAG 2.1 06/29/2021    INR 1.07 06/28/2021    BILITOTAL 1.4 (H) 06/29/2021    AST 32 06/29/2021    ALT 61 06/29/2021    ALKPHOS 201 (H) 06/29/2021    PROTTOTAL 5.4 (L) 06/29/2021    ALBUMIN 2.4 (L) 06/29/2021       I have assessed all abnormal lab values for their clinical significance and any values considered clinically significant have been addressed in the assessment and plan.         Assessment and Plan:   Juvenal Blake is a 55 year old male PMH significant for refractory NHL, undergoing  NK infusion. Admitted with rituxan (biosimilar) infusion reaction 6/24 and remained admitted through completion of lymphodepleting chemo and  NK cell infusion.  Currently day +2        1.  Follicular Lymphoma:  - See previous notes regarding prior therapy. Most recently received Haplo NAM-NK therapy 9/2020 and Iidelaisib 10/2020 through 6/2021.  - Now on  therapy.   - s/p Flu/Cy/rituxan LD chemo (Rituxan infusion abbreviated d/t severe reaction. See below).   -  infusion 6/28. Currently day +2  - ICANS 10/10- next due 7/1  - Repeat cell infusion on 5C phase 1 unit on 7/7/2021    - Remains on allopurinol.     Rituxan (biosimilar) reaction 6/24: Previously tolerated rituxan many times. Received 2 doses epinephrine, albuterol, and methylpred. Required up to 6L of oxygen. Now resolved.      2.  HEME: Keep hgb >7g/dL, plt<10,000  - pancytopenic secondary to chemo/disease. Give 1u RBCs today. Possible plts tomorrow.   - blasts were 8% on 6/25 differential; this is  "decreasing.  No signs of new diagnosis on 6/8 bone marrow biopsy.      3.  :   Initial radical prostatectomy and bilateral lymph node dissection. November 15, 2017. Prostatic adenocarcinoma Ward 4+3 = 7 with tertiary pattern 5. Tumor involves 45% of total surface area. Positive for extensive extraprostatic extension. Positive for bilateral seminal vesicle invasion. Multiple margins positive. Lymphovascular invasion not identified. Perineural invasion was noted. Lymph nodes negative. 3 were examined (2 right obturator and 1 left obturator).    Completed radiation to the prostate fossa and pelvic lymph nodes at [facility] early May 2018. He received 4500 cGy in 25 fractions. He then had 2340 cGy boost in 13 fractions to the prostatic fossa, for a total dose of 6240 cGy in 38 treatment fractions delivered over 54 days. He did not receive hormonal therapy:       4.  ID:   - prophy ACV/fluc. Start levofloxacin as now neutropenic.   - C diff colitis: finished vancomycin 6/23, treated for 7 days       5.  FEN/Renal:   - Cr stable  - Volume Overload: improved after lasix administration inpatient. Weight down 15 lbs on discharge. Monitor in clinic. Weight back up 4 lbs. Will give 20mg IV lasix today.   - Monitor TLS labs, WNL 6/29  - Remains on allopurinol.        6. GI:   Abdominal Pain secondary to cancer- oxycodone prn. Aware not to take with ativan. Refilled oxycodone 6/29, 15 tablets   Transaminitis: suspect related to cancer. Monitor. Overall stable today. Bilirubin 1.4 (0.8 direct). If further trends up would recommend abdominal imaging/ultrasound.        7. CV: Cardiology consulted for SOB, elevated troponin during hospital admission 6/24. Per their note: \"Likely Type II MI in setting of demand ischemia from tachycardia, acute on chronic anemia, acute on chronic illness. No evidence of heart failure at this time.\"    - LE US neg for DVT.   - Echo 6/25 (murmur noted on 6/27): EF 55-60%, NL ventricular " function.  - Fasting lipid panel 6/27 with total cholesterol 157, high triglycerides at 362   - Recommend checking lipid profile and outpatient follow up for non-obstructive CAD workup when he is more stable. Cardiology follow up in 1 month.        RTC: tomorrow for labs, provider visit, possible plts/lasix.   7/1 for labs, provider and ICANS assessment.     40 minutes spent on the date of the encounter doing chart review, history and exam, documentation and further activities per the note    Topher Haro PA-C  [phone]        Again, thank you for allowing me to participate in the care of your patient.        Sincerely,        [facility] Advanced Practice Provider

## 2021-06-29 NOTE — PROGRESS NOTES
I got a call from Nancy stating there Juvenal had a fever with a temperature of 104 degree Fahrenheit. He is feeling well overall with no chills, new cough, diarrhea, constipation, chest pain, shortness of breath, dysuria. His wife give him Tylenol and his temperature is down to 100 F 30 minutes later. Patient is not neutropenic. He has an appointment with BMT clinic tomorrow at 11 AM. So he can be followed in clinic tomorrow. I will discuss the case with Dr. Selby. His fever is likely relayed to the  NK cell infusion.

## 2021-06-29 NOTE — PROGRESS NOTES
"Infusion Nursing Note:  Juvenal Blake presents today for add-on transfusion.    Patient seen by provider today: Yes: Topher Haro   present during visit today: Not Applicable.    Note: Labs were monitored.    Intravenous Access:  PICC.    Treatment Conditions:  Patient received an add-on one unit of red blood cells for a Hgb of 6.4.  Note:  During the 10 minutes vital signs, it was noted Patient's blood pressure had dropped slightly and he reported feeling \"tight in the chest.\"  Transfusion was immediately stopped.  Chest tightness quickly resolved, vital signs returned to their baseline.  Provider was notified and she assessed Patient.  RBCs were restarted and Patient had no further difficulties.  Per Provider order, Patient was given 20 mg IV push Lasix.      Post Infusion Assessment:  Patient tolerated transfusion without incident.       Discharge Plan:   Patient discharged in stable condition accompanied by:  Family.      ANGELA TEIXEIRA RN                      "

## 2021-06-29 NOTE — LETTER
"    6/29/2021         RE: Juvenal Blake  [address] [place] [address]        Dear Colleague,    Thank you for referring your patient, Juvenal Blake, to the [facility]LOOD AND MARROW TRANSPLANT PROGRAM [place]. Please see a copy of my visit note below.    Infusion Nursing Note:  Juvenal Blake presents today for add-on transfusion.    Patient seen by provider today: Yes: Topher Haro   present during visit today: Not Applicable.    Note: Labs were monitored.    Intravenous Access:  PICC.    Treatment Conditions:  Patient received an add-on one unit of red blood cells for a Hgb of 6.4.  Note:  During the 10 minutes vital signs, it was noted Patient's blood pressure had dropped slightly and he reported feeling \"tight in the chest.\"  Transfusion was immediately stopped.  Chest tightness quickly resolved, vital signs returned to their baseline.  Provider was notified and she assessed Patient.  RBCs were restarted and Patient had no further difficulties.  Per Provider order, Patient was given 20 mg IV push Lasix.      Post Infusion Assessment:  Patient tolerated transfusion without incident.       Discharge Plan:   Patient discharged in stable condition accompanied by:  Family.      ANGELA TEIXEIRA, VALERIE                          Again, thank you for allowing me to participate in the care of your patient.        Sincerely,        [facility]    "

## 2021-06-29 NOTE — PLAN OF CARE
Physical Therapy Discharge Summary    Reason for therapy discharge:    Discharged to home.    Progress towards therapy goal(s). See goals on Care Plan in [facility] electronic health record for goal details.  Goals partially met.  Barriers to achieving goals:   discharge from facility.    Therapy recommendation(s):    Pt is safe to discharge to home with assist when medically appropriate. Pt would benefit from OP cardiac rehab to improve activity tolerance post MI.

## 2021-06-29 NOTE — NURSING NOTE
"Oncology Rooming Note    June 29, 2021 12:09 PM   Juvenal Blake is a 55 year old male who presents for:    Chief Complaint   Patient presents with     Oncology Clinic Visit     F/u Follicular lymphoma     Blood Draw     Labs drawn from PICC by RN in lab. Vitals taken. Checked into next appointment.      Initial Vitals: /76 (BP Location: Left arm, Patient Position: Sitting, Cuff Size: Adult Large)   Pulse 81   Temp 98.1  F (36.7  C) (Oral)   Resp 16   Wt 110.8 kg (244 lb 4.8 oz)   SpO2 100%   BMI 36.60 kg/m   Estimated body mass index is 36.6 kg/m  as calculated from the following:    Height as of 6/25/21: 1.74 m (5' 8.5\").    Weight as of this encounter: 110.8 kg (244 lb 4.8 oz). Body surface area is 2.31 meters squared.  No Pain (1) Comment: Data Unavailable   No LMP for male patient.  Allergies reviewed: Yes  Medications reviewed: Yes    Medications: Medication refills not needed today.  Pharmacy name entered into [employer]: [id number] - [place], [place] - [address] AT [facility]    Clinical concerns: Patient requests med refill Oxycodone 5 mg Topher was notified.      Tyler Morales MA            "

## 2021-06-29 NOTE — PROGRESS NOTES
Line Company: Line Company:  [facility] [phone] for line care supplies   Referral made for line care:  Yes    IV medications needed at discharge:  None   Pharmacy concerns: none    PT/OT/therapies recommended: OP PT recommended.   Referral made for PT/OT/therapies: No, patient declined. Educated on importance of continuing HEP and notifying OP RNCC if patient changes his mind.  D/c location: Home to [place]    Has placement need been communicated to Social Work?  Yes     NC Teaching time arranged with patient/caregiver: Completed 6/28 at 12:30 with patient and wife Nancy    Notify nurse to schedule line care class/ DM teaching, prior to d/c: Patient and caregiver previously received teaching, and decline further need    Caregiver: Nancy (wife) [phone]      Rachell Johnson, RN BSN  RN Care Coordinator - Inpatient BMT Unit 5C  Phone [phone]  Pager [phone]

## 2021-06-29 NOTE — NURSING NOTE
Chief Complaint   Patient presents with     Oncology Clinic Visit     F/u Follicular lymphoma     Blood Draw     Labs drawn from PICC by RN in lab. Vitals taken. Checked into next appointment.      PICC accessed by RN in lab, labs drawn.  Both lines flushed with heparin.    Vital signs taken.  Pt checked in to next appointment.     Bronwyn Sarabia RN

## 2021-06-29 NOTE — PROGRESS NOTES
BMT Daily Progress  Note      Juvenal Blake is a 55 year old male PMH significant for refractory NHL, undergoing  NK infusion. Admitted with rituxan (biosimilar) infusion reaction 6/24 and remained admitted through completion of lymphodepleting chemo and  NK cell infusion.  Currently day +2     HPI: Discharged from hospital yesterday following  infusion and IL2. Had a fever for 104 following IL2 injection which came down with tylenol. He was asymptomatic with this. No recurrence of fevers off Tylenol. Had good urine output with lasix while admitted but weight is back up 4 lbs today. Abdominal pain is overall stable but still has pain when driving over bumps. Breathing is comfortable. Had a headache last night but this has resolved. Small amount of erythema around IL2 injection site but no pain.      ROS:  10 point ROS negative except as above.      Physical Exam:   /76 (BP Location: Left arm, Patient Position: Sitting, Cuff Size: Adult Large)   Pulse 81   Temp 98.1  F (36.7  C) (Oral)   Resp 16   Wt 110.8 kg (244 lb 4.8 oz)   SpO2 100%   BMI 36.60 kg/m      Wt Readings from Last 5 Encounters:   06/29/21 110.8 kg (244 lb 4.8 oz)   06/28/21 109 kg (240 lb 4.8 oz)   06/24/21 116.4 kg (256 lb 11.2 oz)   06/23/21 115.8 kg (255 lb 3.2 oz)   06/16/21 116.5 kg (256 lb 14.4 oz)     General: Conversant. ECOG 1  Eyes: PERRL, appears mildly icteric    Lungs: CTA bilaterally, decreased breath sounds bilaterally; no wheeze.   Cardiovascular: RRR  Abdominal/Rectal: ++protuberant, +firm to palpation. Not tender.  BS present.   Lymphatics: 1+ pedal edema  Skin: no rash; scattered ecchymosis  Neuro: A&O/ CN intact (II-XII); ICANS 10/10  Additional Findings: PICC; portacath c/d/i      ICANS: 10/10 (6/29).  Patient has writing log with him        Labs:  Lab Results   Component Value Date    WBC 1.4 (L) 06/29/2021    ANEU 3.1 06/28/2021    HGB 6.4 (LL) 06/29/2021    HCT 20.4 (L) 06/29/2021    PLT 19 (LL)  06/29/2021     06/29/2021    POTASSIUM 4.4 06/29/2021    CHLORIDE 99 06/29/2021    CO2 26 06/29/2021     (H) 06/29/2021    BUN 18 06/29/2021    CR 1.21 06/29/2021    MAG 2.1 06/29/2021    INR 1.07 06/28/2021    BILITOTAL 1.4 (H) 06/29/2021    AST 32 06/29/2021    ALT 61 06/29/2021    ALKPHOS 201 (H) 06/29/2021    PROTTOTAL 5.4 (L) 06/29/2021    ALBUMIN 2.4 (L) 06/29/2021       I have assessed all abnormal lab values for their clinical significance and any values considered clinically significant have been addressed in the assessment and plan.         Assessment and Plan:   Juvenal Blake is a 55 year old male PMH significant for refractory NHL, undergoing  NK infusion. Admitted with rituxan (biosimilar) infusion reaction 6/24 and remained admitted through completion of lymphodepleting chemo and  NK cell infusion.  Currently day +2        1.  Follicular Lymphoma:  - See previous notes regarding prior therapy. Most recently received Haplo NAM-NK therapy 9/2020 and Iidelaisib 10/2020 through 6/2021.  - Now on  therapy.   - s/p Flu/Cy/rituxan LD chemo (Rituxan infusion abbreviated d/t severe reaction. See below).   -  infusion 6/28. Currently day +2  - ICANS 10/10- next due 7/1  - Repeat cell infusion on 5C phase 1 unit on 7/7/2021    - Remains on allopurinol.     Rituxan (biosimilar) reaction 6/24: Previously tolerated rituxan many times. Received 2 doses epinephrine, albuterol, and methylpred. Required up to 6L of oxygen. Now resolved.      2.  HEME: Keep hgb >7g/dL, plt<10,000  - pancytopenic secondary to chemo/disease. Give 1u RBCs today. Possible plts tomorrow.   - blasts were 8% on 6/25 differential; this is decreasing.  No signs of new diagnosis on 6/8 bone marrow biopsy.      3.  :   Initial radical prostatectomy and bilateral lymph node dissection. November 15, 2017. Prostatic adenocarcinoma Maricel 4+3 = 7 with tertiary pattern 5. Tumor involves 45% of total surface area.  "Positive for extensive extraprostatic extension. Positive for bilateral seminal vesicle invasion. Multiple margins positive. Lymphovascular invasion not identified. Perineural invasion was noted. Lymph nodes negative. 3 were examined (2 right obturator and 1 left obturator).    Completed radiation to the prostate fossa and pelvic lymph nodes at [facility] early May 2018. He received 4500 cGy in 25 fractions. He then had 2340 cGy boost in 13 fractions to the prostatic fossa, for a total dose of 6240 cGy in 38 treatment fractions delivered over 54 days. He did not receive hormonal therapy:       4.  ID:   - prophy ACV/fluc. Start levofloxacin as now neutropenic.   - C diff colitis: finished vancomycin 6/23, treated for 7 days       5.  FEN/Renal:   - Cr stable  - Volume Overload: improved after lasix administration inpatient. Weight down 15 lbs on discharge. Monitor in clinic. Weight back up 4 lbs. Will give 20mg IV lasix today.   - Monitor TLS labs, WNL 6/29  - Remains on allopurinol.        6. GI:   Abdominal Pain secondary to cancer- oxycodone prn. Aware not to take with ativan. Refilled oxycodone 6/29, 15 tablets   Transaminitis: suspect related to cancer. Monitor. Overall stable today. Bilirubin 1.4 (0.8 direct). If further trends up would recommend abdominal imaging/ultrasound.        7. CV: Cardiology consulted for SOB, elevated troponin during hospital admission 6/24. Per their note: \"Likely Type II MI in setting of demand ischemia from tachycardia, acute on chronic anemia, acute on chronic illness. No evidence of heart failure at this time.\"    - LE US neg for DVT.   - Echo 6/25 (murmur noted on 6/27): EF 55-60%, NL ventricular function.  - Fasting lipid panel 6/27 with total cholesterol 157, high triglycerides at 362   - Recommend checking lipid profile and outpatient follow up for non-obstructive CAD workup when he is more stable. Cardiology follow up in 1 month.        RTC: tomorrow for labs, " provider visit, possible plts/lasix.   7/1 for labs, provider and ICANS assessment.     40 minutes spent on the date of the encounter doing chart review, history and exam, documentation and further activities per the note    Topher Haro PA-C  [phone]

## 2021-06-30 ENCOUNTER — ONCOLOGY VISIT (OUTPATIENT)
Dept: TRANSPLANT | Facility: CLINIC | Age: 56
End: 2021-06-30
Attending: PHYSICIAN ASSISTANT
Payer: COMMERCIAL

## 2021-06-30 ENCOUNTER — APPOINTMENT (OUTPATIENT)
Dept: LAB | Facility: CLINIC | Age: 56
End: 2021-06-30
Attending: PHYSICIAN ASSISTANT
Payer: COMMERCIAL

## 2021-06-30 VITALS
BODY MASS INDEX: 35.76 KG/M2 | TEMPERATURE: 98.6 F | OXYGEN SATURATION: 98 % | RESPIRATION RATE: 14 BRPM | WEIGHT: 238.7 LBS | HEART RATE: 90 BPM | DIASTOLIC BLOOD PRESSURE: 69 MMHG | SYSTOLIC BLOOD PRESSURE: 112 MMHG

## 2021-06-30 DIAGNOSIS — C82.08 FOLLICULAR LYMPHOMA GRADE I, LYMPH NODES OF MULTIPLE SITES (H): Primary | ICD-10-CM

## 2021-06-30 DIAGNOSIS — Z85.72 HISTORY OF LYMPHOMA: ICD-10-CM

## 2021-06-30 DIAGNOSIS — A04.72 COLITIS DUE TO CLOSTRIDIUM DIFFICILE: Primary | ICD-10-CM

## 2021-06-30 LAB
BLD PROD TYP BPU: NORMAL
BLD PROD TYP BPU: NORMAL
BLD UNIT ID BPU: 0
BLD UNIT ID BPU: 0
BLOOD PRODUCT CODE: NORMAL
BLOOD PRODUCT CODE: NORMAL
BPU ID: NORMAL
BPU ID: NORMAL
TRANSFUSION STATUS PATIENT QL: NORMAL

## 2021-06-30 PROCEDURE — 36592 COLLECT BLOOD FROM PICC: CPT

## 2021-06-30 PROCEDURE — 99214 OFFICE O/P EST MOD 30 MIN: CPT

## 2021-06-30 PROCEDURE — 250N000011 HC RX IP 250 OP 636: Performed by: PHYSICIAN ASSISTANT

## 2021-06-30 PROCEDURE — G0463 HOSPITAL OUTPT CLINIC VISIT: HCPCS

## 2021-06-30 RX ORDER — SULFAMETHOXAZOLE/TRIMETHOPRIM 800-160 MG
TABLET ORAL
COMMUNITY
Start: 2020-10-05 | End: 2021-08-17

## 2021-06-30 RX ORDER — HEPARIN SODIUM,PORCINE 10 UNIT/ML
5 VIAL (ML) INTRAVENOUS ONCE
Status: COMPLETED | OUTPATIENT
Start: 2021-06-30 | End: 2021-06-30

## 2021-06-30 RX ORDER — HEPARIN SODIUM,PORCINE 10 UNIT/ML
5 VIAL (ML) INTRAVENOUS
Status: CANCELLED | OUTPATIENT
Start: 2021-06-30

## 2021-06-30 RX ORDER — NAPROXEN 500 MG/1
TABLET ORAL
COMMUNITY
End: 2021-08-17

## 2021-06-30 RX ORDER — BECLOMETHASONE DIPROPIONATE HFA 80 UG/1
AEROSOL, METERED RESPIRATORY (INHALATION)
COMMUNITY
End: 2021-08-17

## 2021-06-30 RX ORDER — VANCOMYCIN HYDROCHLORIDE 125 MG/1
125 CAPSULE ORAL 2 TIMES DAILY
Qty: 28 CAPSULE | Refills: 0 | Status: SHIPPED | OUTPATIENT
Start: 2021-06-30 | End: 2021-07-19

## 2021-06-30 RX ORDER — MULTIVITAMIN WITH FOLIC ACID 400 MCG
TABLET ORAL
COMMUNITY
End: 2021-08-17

## 2021-06-30 RX ORDER — HEPARIN SODIUM (PORCINE) LOCK FLUSH IV SOLN 100 UNIT/ML 100 UNIT/ML
5 SOLUTION INTRAVENOUS
Status: CANCELLED | OUTPATIENT
Start: 2021-06-30

## 2021-06-30 RX ORDER — HYDROXYZINE PAMOATE 50 MG/1
CAPSULE ORAL
COMMUNITY
Start: 2020-06-19 | End: 2021-06-30

## 2021-06-30 RX ORDER — SILDENAFIL CITRATE 20 MG/1
TABLET ORAL
COMMUNITY
End: 2021-08-17

## 2021-06-30 RX ORDER — ALBUTEROL SULFATE 90 UG/1
AEROSOL, METERED RESPIRATORY (INHALATION)
COMMUNITY
End: 2021-08-17

## 2021-06-30 RX ORDER — IDELALISIB 150 MG/1
TABLET, FILM COATED ORAL
COMMUNITY
Start: 2021-06-22 | End: 2021-07-29

## 2021-06-30 RX ADMIN — Medication 5 ML: at 08:37

## 2021-06-30 ASSESSMENT — PAIN SCALES - GENERAL: PAINLEVEL: MILD PAIN (3)

## 2021-06-30 NOTE — NURSING NOTE
Chief Complaint   Patient presents with     Oncology Clinic Visit     FOLLICULAR LYMPHOMA     Blood Draw     Labs drawn from PICC by RN in lab. Vitals taken. Checked into next appointment.      PICC accessed by RN in lab, labs drawn.  Both lines flushed with heparin.    Vital signs taken.  Pt checked in to next appointment.     Bronwyn Sarabia RN

## 2021-06-30 NOTE — PROGRESS NOTES
BMT Daily Progress  Note      Juvenal Blake is a 55 year old male PMH significant for refractory NHL, undergoing  NK infusion. Admitted with rituxan (biosimilar) infusion reaction 6/24 and remained admitted through completion of lymphodepleting chemo and  NK cell infusion.  Currently day +3     HPI: No recurrence of fever. Swelling/belly discomfort stable. No CP or SOB. No new issues. Didn't wear compression stockings today.      ROS:  10 point ROS negative except as above.      Physical Exam:   /69 (BP Location: Left arm, Patient Position: Sitting, Cuff Size: Adult Large)   Pulse 90   Temp 98.6  F (37  C) (Oral)   Resp 14   Wt 108.3 kg (238 lb 11.2 oz)   SpO2 98%   BMI 35.76 kg/m      Wt Readings from Last 5 Encounters:   06/30/21 108.3 kg (238 lb 11.2 oz)   06/29/21 110.8 kg (244 lb 4.8 oz)   06/28/21 109 kg (240 lb 4.8 oz)   06/24/21 116.4 kg (256 lb 11.2 oz)   06/23/21 115.8 kg (255 lb 3.2 oz)     General: Conversant. ECOG 1  Eyes: PERRL, appears mildly icteric    Lungs: CTA bilaterally, decreased breath sounds bilaterally; no wheeze.   Cardiovascular: RRR  Abdominal/Rectal: ++protuberant, +firm to palpation. Not tender.  BS present.   Lymphatics: 1+ pedal edema  Skin: no rash; scattered ecchymosis. +jaundiced   Neuro: A&O/ CN intact (II-XII)  Additional Findings: PICC; portacath c/d/i      ICANS: 10/10 (6/29).  Patient has writing log with him. Needs assessment again on 7/1.        Labs:  Lab Results   Component Value Date    WBC 0.7 (LL) 06/30/2021    ANEU 1.2 (L) 06/29/2021    HGB 7.3 (L) 06/30/2021    HCT 22.1 (L) 06/30/2021    PLT 19 (LL) 06/30/2021     06/29/2021    POTASSIUM 4.4 06/29/2021    CHLORIDE 99 06/29/2021    CO2 26 06/29/2021     (H) 06/29/2021    BUN 18 06/29/2021    CR 1.21 06/29/2021    MAG 2.1 06/29/2021    INR 1.07 06/28/2021    BILITOTAL 1.4 (H) 06/29/2021    AST 32 06/29/2021    ALT 61 06/29/2021    ALKPHOS 201 (H) 06/29/2021    PROTTOTAL 5.4 (L)  06/29/2021    ALBUMIN 2.4 (L) 06/29/2021       I have assessed all abnormal lab values for their clinical significance and any values considered clinically significant have been addressed in the assessment and plan.         Assessment and Plan:   Juvenal Blake is a 55 year old male PMH significant for refractory NHL, undergoing  NK infusion. Admitted with rituxan (biosimilar) infusion reaction 6/24 and remained admitted through completion of lymphodepleting chemo and  NK cell infusion.  Currently day +3        1.  Follicular Lymphoma:  - See previous notes regarding prior therapy. Most recently received Haplo NAM-NK therapy 9/2020 and Iidelaisib 10/2020 through 6/2021.  - Now on  therapy.   - s/p Flu/Cy/rituxan LD chemo (Rituxan infusion abbreviated d/t severe reaction. See below).   -  infusion 6/28. Currently day +3  - ICANS 10/10- next due 7/1  - Repeat cell infusion on 5C phase 1 unit on 7/6/2021    - Remains on allopurinol.     Rituxan (biosimilar) reaction 6/24: Previously tolerated rituxan many times. Received 2 doses epinephrine, albuterol, and methylpred. Required up to 6L of oxygen. Now resolved.      2.  HEME: Keep hgb >7g/dL, plt<10,000  - pancytopenic secondary to chemo/disease. Possible blood and plts tomorrow. Need to give with 20mg IV lasix if needed.      3.  :   Initial radical prostatectomy and bilateral lymph node dissection. November 15, 2017. Prostatic adenocarcinoma Maricel 4+3 = 7 with tertiary pattern 5. Tumor involves 45% of total surface area. Positive for extensive extraprostatic extension. Positive for bilateral seminal vesicle invasion. Multiple margins positive. Lymphovascular invasion not identified. Perineural invasion was noted. Lymph nodes negative. 3 were examined (2 right obturator and 1 left obturator).    Completed radiation to the prostate fossa and pelvic lymph nodes at [facility] early May 2018. He received 4500 cGy in 25 fractions. He then  "had 2340 cGy boost in 13 fractions to the prostatic fossa, for a total dose of 6240 cGy in 38 treatment fractions delivered over 54 days. He did not receive hormonal therapy:       4.  ID:   - prophy Lev/ACV/fluc.    - C diff colitis: finished vancomycin 6/23, treated for 7 days. Now that he is neutropenic and lev started we should prophylax with vanco BID. Sent to [facility] today.        5.  FEN/Renal:   - Cr stable  - Volume Overload: improved after lasix administration inpatient. Weight down 15 lbs on discharge. Monitor in clinic. Give 20mg IV lasix with blood products.   - Monitor TLS labs  - Remains on allopurinol.        6. GI:   Abdominal Pain secondary to cancer- oxycodone prn. Aware not to take with ativan. Refilled oxycodone 6/29, 15 tablets   Transaminitis: suspect related to cancer. Monitor.      7. CV: Cardiology consulted for SOB, elevated troponin during hospital admission 6/24. Per their note: \"Likely Type II MI in setting of demand ischemia from tachycardia, acute on chronic anemia, acute on chronic illness. No evidence of heart failure at this time.\"    - LE US neg for DVT.   - Echo 6/25 (murmur noted on 6/27): EF 55-60%, NL ventricular function.  - Fasting lipid panel 6/27 with total cholesterol 157, high triglycerides at 362   - Recommend checking lipid profile and outpatient follow up for non-obstructive CAD workup when he is more stable. Cardiology follow up in 1 month.        RTC: 7/1 for labs, provider and ICANS assessment, possible blood/plts (give with lasix)     30 minutes spent on the date of the encounter doing chart review, history and exam, documentation and further activities per the note    Rossi Hardy PA-C  [phone]  "

## 2021-06-30 NOTE — LETTER
6/30/2021         RE: Juvenal Blake  [address] [place] [address]        Dear Colleague,    Thank you for referring your patient, Juvenal Blake, to the [facility] BLOOD AND MARROW TRANSPLANT PROGRAM [place]. Please see a copy of my visit note below.    BMT Daily Progress  Note      Juvenal Blake is a 55 year old male PMH significant for refractory NHL, undergoing  NK infusion. Admitted with rituxan (biosimilar) infusion reaction 6/24 and remained admitted through completion of lymphodepleting chemo and  NK cell infusion.  Currently day +3     HPI: No recurrence of fever. Swelling/belly discomfort stable. No CP or SOB. No new issues. Didn't wear compression stockings today.      ROS:  10 point ROS negative except as above.      Physical Exam:   /69 (BP Location: Left arm, Patient Position: Sitting, Cuff Size: Adult Large)   Pulse 90   Temp 98.6  F (37  C) (Oral)   Resp 14   Wt 108.3 kg (238 lb 11.2 oz)   SpO2 98%   BMI 35.76 kg/m      Wt Readings from Last 5 Encounters:   06/30/21 108.3 kg (238 lb 11.2 oz)   06/29/21 110.8 kg (244 lb 4.8 oz)   06/28/21 109 kg (240 lb 4.8 oz)   06/24/21 116.4 kg (256 lb 11.2 oz)   06/23/21 115.8 kg (255 lb 3.2 oz)     General: Conversant. ECOG 1  Eyes: PERRL, appears mildly icteric    Lungs: CTA bilaterally, decreased breath sounds bilaterally; no wheeze.   Cardiovascular: RRR  Abdominal/Rectal: ++protuberant, +firm to palpation. Not tender.  BS present.   Lymphatics: 1+ pedal edema  Skin: no rash; scattered ecchymosis. +jaundiced   Neuro: A&O/ CN intact (II-XII)  Additional Findings: PICC; portacath c/d/i      ICANS: 10/10 (6/29).  Patient has writing log with him. Needs assessment again on 7/1.        Labs:  Lab Results   Component Value Date    WBC 0.7 (LL) 06/30/2021    ANEU 1.2 (L) 06/29/2021    HGB 7.3 (L) 06/30/2021    HCT 22.1 (L) 06/30/2021    PLT 19 (LL) 06/30/2021     06/29/2021    POTASSIUM 4.4 06/29/2021    CHLORIDE  99 06/29/2021    CO2 26 06/29/2021     (H) 06/29/2021    BUN 18 06/29/2021    CR 1.21 06/29/2021    MAG 2.1 06/29/2021    INR 1.07 06/28/2021    BILITOTAL 1.4 (H) 06/29/2021    AST 32 06/29/2021    ALT 61 06/29/2021    ALKPHOS 201 (H) 06/29/2021    PROTTOTAL 5.4 (L) 06/29/2021    ALBUMIN 2.4 (L) 06/29/2021       I have assessed all abnormal lab values for their clinical significance and any values considered clinically significant have been addressed in the assessment and plan.         Assessment and Plan:   Juvenal Blake is a 55 year old male PMH significant for refractory NHL, undergoing  NK infusion. Admitted with rituxan (biosimilar) infusion reaction 6/24 and remained admitted through completion of lymphodepleting chemo and  NK cell infusion.  Currently day +3        1.  Follicular Lymphoma:  - See previous notes regarding prior therapy. Most recently received Haplo NAM-NK therapy 9/2020 and Iidelaisib 10/2020 through 6/2021.  - Now on  therapy.   - s/p Flu/Cy/rituxan LD chemo (Rituxan infusion abbreviated d/t severe reaction. See below).   -  infusion 6/28. Currently day +3  - ICANS 10/10- next due 7/1  - Repeat cell infusion on 5C phase 1 unit on 7/6/2021    - Remains on allopurinol.     Rituxan (biosimilar) reaction 6/24: Previously tolerated rituxan many times. Received 2 doses epinephrine, albuterol, and methylpred. Required up to 6L of oxygen. Now resolved.      2.  HEME: Keep hgb >7g/dL, plt<10,000  - pancytopenic secondary to chemo/disease. Possible blood and plts tomorrow. Need to give with 20mg IV lasix if needed.      3.  :   Initial radical prostatectomy and bilateral lymph node dissection. November 15, 2017. Prostatic adenocarcinoma Maricel 4+3 = 7 with tertiary pattern 5. Tumor involves 45% of total surface area. Positive for extensive extraprostatic extension. Positive for bilateral seminal vesicle invasion. Multiple margins positive. Lymphovascular invasion not  "identified. Perineural invasion was noted. Lymph nodes negative. 3 were examined (2 right obturator and 1 left obturator).    Completed radiation to the prostate fossa and pelvic lymph nodes at [facility] early May 2018. He received 4500 cGy in 25 fractions. He then had 2340 cGy boost in 13 fractions to the prostatic fossa, for a total dose of 6240 cGy in 38 treatment fractions delivered over 54 days. He did not receive hormonal therapy:       4.  ID:   - prophy Lev/ACV/fluc.    - C diff colitis: finished vancomycin 6/23, treated for 7 days. Now that he is neutropenic and lev started we should prophylax with vanco BID. [facility]        5.  FEN/Renal:   - Cr stable  - Volume Overload: improved after lasix administration inpatient. Weight down 15 lbs on discharge. Monitor in clinic. Give 20mg IV lasix with blood products.   - Monitor TLS labs  - Remains on allopurinol.        6. GI:   Abdominal Pain secondary to cancer- oxycodone prn. Aware not to take with ativan. Refilled oxycodone 6/29, 15 tablets   Transaminitis: suspect related to cancer. Monitor.      7. CV: Cardiology consulted for SOB, elevated troponin during hospital admission 6/24. Per their note: \"Likely Type II MI in setting of demand ischemia from tachycardia, acute on chronic anemia, acute on chronic illness. No evidence of heart failure at this time.\"    - LE US neg for DVT.   - Echo 6/25 (murmur noted on 6/27): EF 55-60%, NL ventricular function.  - Fasting lipid panel 6/27 with total cholesterol 157, high triglycerides at 362   - Recommend checking lipid profile and outpatient follow up for non-obstructive CAD workup when he is more stable. Cardiology follow up in 1 month.        RTC: 7/1 for labs, provider and ICANS assessment, possible blood/plts (give with lasix)     30 minutes spent on the date of the encounter doing chart review, history and exam, documentation and further activities per the note    Rossi Hardy, " MAL  [phone]      Again, thank you for allowing me to participate in the care of your patient.        Sincerely,        BMT Advanced Practice Provider

## 2021-06-30 NOTE — NURSING NOTE
"Oncology Rooming Note    June 30, 2021 8:48 AM   Juvenal Blake is a 55 year old male who presents for:    Chief Complaint   Patient presents with     Oncology Clinic Visit     FOLLICULAR LYMPHOMA     Blood Draw     Labs drawn from PICC by RN in lab. Vitals taken. Checked into next appointment.      Initial Vitals: /69 (BP Location: Left arm, Patient Position: Sitting, Cuff Size: Adult Large)   Pulse 90   Temp 98.6  F (37  C) (Oral)   Resp 14   Wt 108.3 kg (238 lb 11.2 oz)   SpO2 98%   BMI 35.76 kg/m   Estimated body mass index is 35.76 kg/m  as calculated from the following:    Height as of 6/25/21: 1.74 m (5' 8.5\").    Weight as of this encounter: 108.3 kg (238 lb 11.2 oz). Body surface area is 2.29 meters squared.  Mild Pain (3) Comment: Data Unavailable   No LMP for male patient.  Allergies reviewed: Yes  Medications reviewed: Yes    Medications: Medication refills not needed today.  Pharmacy name entered into [employer]: [facility] #[id number] - [place], [place] - [address] AT [facility]    Clinical concerns: No new concerns.        Carmela Montano CMA              "

## 2021-07-01 ENCOUNTER — AMBULATORY - HEALTHEAST (OUTPATIENT)
Dept: FAMILY MEDICINE | Facility: CLINIC | Age: 56
End: 2021-07-01

## 2021-07-01 ENCOUNTER — ONCOLOGY VISIT (OUTPATIENT)
Dept: TRANSPLANT | Facility: CLINIC | Age: 56
End: 2021-07-01
Attending: PHYSICIAN ASSISTANT
Payer: COMMERCIAL

## 2021-07-01 ENCOUNTER — INFUSION THERAPY VISIT (OUTPATIENT)
Dept: TRANSPLANT | Facility: CLINIC | Age: 56
End: 2021-07-01
Payer: COMMERCIAL

## 2021-07-01 ENCOUNTER — APPOINTMENT (OUTPATIENT)
Dept: LAB | Facility: CLINIC | Age: 56
End: 2021-07-01
Attending: PHYSICIAN ASSISTANT
Payer: COMMERCIAL

## 2021-07-01 VITALS
SYSTOLIC BLOOD PRESSURE: 116 MMHG | HEART RATE: 79 BPM | RESPIRATION RATE: 16 BRPM | DIASTOLIC BLOOD PRESSURE: 73 MMHG | OXYGEN SATURATION: 98 % | TEMPERATURE: 98.9 F

## 2021-07-01 VITALS
BODY MASS INDEX: 35.45 KG/M2 | RESPIRATION RATE: 16 BRPM | SYSTOLIC BLOOD PRESSURE: 118 MMHG | DIASTOLIC BLOOD PRESSURE: 67 MMHG | WEIGHT: 236.6 LBS | OXYGEN SATURATION: 98 % | TEMPERATURE: 98 F | HEART RATE: 91 BPM

## 2021-07-01 DIAGNOSIS — Z85.72 HISTORY OF LYMPHOMA: Primary | ICD-10-CM

## 2021-07-01 DIAGNOSIS — C82.08 FOLLICULAR LYMPHOMA GRADE I, LYMPH NODES OF MULTIPLE SITES (H): ICD-10-CM

## 2021-07-01 DIAGNOSIS — C82.08 FOLLICULAR LYMPHOMA GRADE I, LYMPH NODES OF MULTIPLE SITES (H): Primary | ICD-10-CM

## 2021-07-01 LAB
ALBUMIN SERPL-MCNC: 2.5 G/DL (ref 3.4–5)
ALP SERPL-CCNC: 190 U/L (ref 40–150)
ALT SERPL W P-5'-P-CCNC: 39 U/L (ref 0–70)
ANION GAP SERPL CALCULATED.3IONS-SCNC: 3 MMOL/L (ref 3–14)
ANISOCYTOSIS BLD QL SMEAR: SLIGHT
AST SERPL W P-5'-P-CCNC: 19 U/L (ref 0–45)
BASOPHILS # BLD AUTO: 0 10E9/L (ref 0–0.2)
BASOPHILS NFR BLD AUTO: 2 %
BILIRUB SERPL-MCNC: 0.9 MG/DL (ref 0.2–1.3)
BLASTS # BLD: 0 10E9/L
BLASTS BLD QL SMEAR: 2.6 %
BLD PROD TYP BPU: NORMAL
BLD UNIT ID BPU: 0
BLOOD PRODUCT CODE: NORMAL
BPU ID: NORMAL
BUN SERPL-MCNC: 15 MG/DL (ref 7–30)
CALCIUM SERPL-MCNC: 9.2 MG/DL (ref 8.5–10.1)
CHLORIDE SERPL-SCNC: 104 MMOL/L (ref 94–109)
CO2 SERPL-SCNC: 28 MMOL/L (ref 20–32)
CREAT SERPL-MCNC: 1.14 MG/DL (ref 0.66–1.25)
DIFFERENTIAL METHOD BLD: ABNORMAL
EOSINOPHIL # BLD AUTO: 0.1 10E9/L (ref 0–0.7)
EOSINOPHIL NFR BLD AUTO: 13.1 %
ERYTHROCYTE [DISTWIDTH] IN BLOOD BY AUTOMATED COUNT: 15.6 % (ref 10–15)
GFR SERPL CREATININE-BSD FRML MDRD: 71 ML/MIN/{1.73_M2}
GLUCOSE SERPL-MCNC: 119 MG/DL (ref 70–99)
HCT VFR BLD AUTO: 20.6 % (ref 40–53)
HGB BLD-MCNC: 6.6 G/DL (ref 13.3–17.7)
LYMPHOCYTES # BLD AUTO: 0.3 10E9/L (ref 0.8–5.3)
LYMPHOCYTES NFR BLD AUTO: 45.7 %
MAGNESIUM SERPL-MCNC: 2 MG/DL (ref 1.6–2.3)
MCH RBC QN AUTO: 28.8 PG (ref 26.5–33)
MCHC RBC AUTO-ENTMCNC: 32 G/DL (ref 31.5–36.5)
MCV RBC AUTO: 90 FL (ref 78–100)
METAMYELOCYTES # BLD: 0 10E9/L
METAMYELOCYTES NFR BLD MANUAL: 0.7 %
MONOCYTES # BLD AUTO: 0.1 10E9/L (ref 0–1.3)
MONOCYTES NFR BLD AUTO: 7.8 %
NEUTROPHILS # BLD AUTO: 0.2 10E9/L (ref 1.6–8.3)
NEUTROPHILS NFR BLD AUTO: 28.1 %
NRBC # BLD AUTO: 0 10*3/UL
NRBC BLD AUTO-RTO: 3 /100
PHOSPHATE SERPL-MCNC: 3.5 MG/DL (ref 2.5–4.5)
PLATELET # BLD AUTO: 20 10E9/L (ref 150–450)
POIKILOCYTOSIS BLD QL SMEAR: SLIGHT
POTASSIUM SERPL-SCNC: 4.1 MMOL/L (ref 3.4–5.3)
PROT SERPL-MCNC: 5.6 G/DL (ref 6.8–8.8)
RBC # BLD AUTO: 2.29 10E12/L (ref 4.4–5.9)
RESEARCH KIT COLLECTION: NORMAL
SODIUM SERPL-SCNC: 134 MMOL/L (ref 133–144)
TRANSFUSION STATUS PATIENT QL: NORMAL
TRANSFUSION STATUS PATIENT QL: NORMAL
WBC # BLD AUTO: 0.7 10E9/L (ref 4–11)

## 2021-07-01 PROCEDURE — 250N000011 HC RX IP 250 OP 636: Performed by: PHYSICIAN ASSISTANT

## 2021-07-01 PROCEDURE — 999N001121 HC STATISTIC RESEARCH KIT COLLECTION

## 2021-07-01 PROCEDURE — 250N000013 HC RX MED GY IP 250 OP 250 PS 637: Performed by: PHYSICIAN ASSISTANT

## 2021-07-01 PROCEDURE — 36430 TRANSFUSION BLD/BLD COMPNT: CPT

## 2021-07-01 PROCEDURE — 99214 OFFICE O/P EST MOD 30 MIN: CPT

## 2021-07-01 PROCEDURE — 83735 ASSAY OF MAGNESIUM: CPT | Performed by: PHYSICIAN ASSISTANT

## 2021-07-01 PROCEDURE — G0463 HOSPITAL OUTPT CLINIC VISIT: HCPCS

## 2021-07-01 PROCEDURE — G0463 HOSPITAL OUTPT CLINIC VISIT: HCPCS | Mod: 25

## 2021-07-01 PROCEDURE — P9040 RBC LEUKOREDUCED IRRADIATED: HCPCS | Performed by: PHYSICIAN ASSISTANT

## 2021-07-01 PROCEDURE — 80053 COMPREHEN METABOLIC PANEL: CPT | Performed by: PHYSICIAN ASSISTANT

## 2021-07-01 PROCEDURE — 85025 COMPLETE CBC W/AUTO DIFF WBC: CPT | Performed by: PHYSICIAN ASSISTANT

## 2021-07-01 PROCEDURE — 84100 ASSAY OF PHOSPHORUS: CPT | Performed by: PHYSICIAN ASSISTANT

## 2021-07-01 RX ORDER — DIPHENHYDRAMINE HCL 25 MG
25 CAPSULE ORAL EVERY 6 HOURS PRN
Status: DISCONTINUED | OUTPATIENT
Start: 2021-07-01 | End: 2021-07-01 | Stop reason: HOSPADM

## 2021-07-01 RX ORDER — ACETAMINOPHEN 325 MG/1
650 TABLET ORAL EVERY 4 HOURS PRN
Status: CANCELLED
Start: 2021-07-01

## 2021-07-01 RX ORDER — ACETAMINOPHEN 325 MG/1
650 TABLET ORAL EVERY 4 HOURS PRN
Status: DISCONTINUED | OUTPATIENT
Start: 2021-07-01 | End: 2021-07-01 | Stop reason: HOSPADM

## 2021-07-01 RX ORDER — HEPARIN SODIUM,PORCINE 10 UNIT/ML
5 VIAL (ML) INTRAVENOUS
Status: DISCONTINUED | OUTPATIENT
Start: 2021-07-01 | End: 2021-07-01 | Stop reason: HOSPADM

## 2021-07-01 RX ORDER — DIPHENHYDRAMINE HCL 25 MG
25 CAPSULE ORAL EVERY 6 HOURS PRN
Status: CANCELLED
Start: 2021-07-01

## 2021-07-01 RX ORDER — MAGNESIUM OXIDE 400 MG/1
400 TABLET ORAL DAILY
Qty: 30 TABLET | Refills: 0 | Status: SHIPPED | OUTPATIENT
Start: 2021-07-01 | End: 2021-08-17

## 2021-07-01 RX ADMIN — Medication 5 ML: at 15:29

## 2021-07-01 RX ADMIN — ACETAMINOPHEN 650 MG: 325 TABLET ORAL at 14:20

## 2021-07-01 RX ADMIN — Medication 5 ML: at 12:53

## 2021-07-01 RX ADMIN — DIPHENHYDRAMINE HYDROCHLORIDE 25 MG: 25 CAPSULE ORAL at 14:20

## 2021-07-01 ASSESSMENT — PAIN SCALES - GENERAL: PAINLEVEL: MILD PAIN (3)

## 2021-07-01 NOTE — PROGRESS NOTES
BMT Daily Progress  Note      Juvenal Blake is a 55 year old male PMH significant for refractory NHL, undergoing  NK infusion. Admitted with rituxan (biosimilar) infusion reaction 6/24 and remained admitted through completion of lymphodepleting chemo and  NK cell infusion.  Currently day +4     HPI: Feels overall well. Swelling/belly discomfort stable. No CP or SOB. Notes a enlarged nodule in his left groin last night. Is seemed firm last night but not as prominent this morning. No pain with this. Areas of known lymphoma are less prominent and not as noticeable. Has a hx of hernias in the past as well. No worsening swelling in his lower extremities. No fevers, chills, or infectious symptoms. No bleeding.      ROS:  8 point ROS negative except as above.      Physical Exam:   /67   Pulse 91   Temp 98  F (36.7  C) (Oral)   Resp 16   Wt 107.3 kg (236 lb 9.6 oz)   SpO2 98%   BMI 35.45 kg/m      Wt Readings from Last 5 Encounters:   07/01/21 107.3 kg (236 lb 9.6 oz)   06/30/21 108.3 kg (238 lb 11.2 oz)   06/29/21 110.8 kg (244 lb 4.8 oz)   06/28/21 109 kg (240 lb 4.8 oz)   06/24/21 116.4 kg (256 lb 11.2 oz)     General: Conversant. ECOG 1  Eyes: PERRL, appears mildly icteric    Lungs: CTA bilaterally, decreased breath sounds bilaterally; no wheeze.   Cardiovascular: RRR  Abdominal/Rectal: ++protuberant, +firm to palpation. Not tender.  BS present. General fullness to left groin comparable to right but no discrete mass.   Lymphatics: 1+ pedal edema  Skin: no rash; scattered ecchymosis.    Neuro: A&O/ CN intact (II-XII)  Additional Findings: PICC; portacath c/d/i      ICANS: 10/11 (7/1).  Patient did not bring writing log with him today, he wrote sentence on a sheet of paper and looked stable. Needs assessment again on 7/6.        Labs:  Lab Results   Component Value Date    WBC 0.7 (LL) 07/01/2021    ANEU 0.2 (LL) 07/01/2021    HGB 6.6 (LL) 07/01/2021    HCT 20.6 (L) 07/01/2021    PLT 20 (LL)  07/01/2021     07/01/2021    POTASSIUM 4.1 07/01/2021    CHLORIDE 104 07/01/2021    CO2 28 07/01/2021     (H) 07/01/2021    BUN 15 07/01/2021    CR 1.14 07/01/2021    MAG 2.0 07/01/2021    INR 1.07 06/28/2021    BILITOTAL 0.9 07/01/2021    AST 19 07/01/2021    ALT 39 07/01/2021    ALKPHOS 190 (H) 07/01/2021    PROTTOTAL 5.6 (L) 07/01/2021    ALBUMIN 2.5 (L) 07/01/2021       I have assessed all abnormal lab values for their clinical significance and any values considered clinically significant have been addressed in the assessment and plan.         Assessment and Plan:   Juvenal Blake is a 55 year old male PMH significant for refractory NHL, undergoing  NK infusion. Admitted with rituxan (biosimilar) infusion reaction 6/24 and remained admitted through completion of lymphodepleting chemo and  NK cell infusion.  Currently day +4        1.  Follicular Lymphoma:  - See previous notes regarding prior therapy. Most recently received Haplo NAM-NK therapy 9/2020 and Iidelaisib 10/2020 through 6/2021.  - Now on  therapy.   - s/p Flu/Cy/rituxan LD chemo (Rituxan infusion abbreviated d/t severe reaction. See below).   -  infusion 6/28. Currently day +4  - ICANS 10/10 today  - Repeat cell infusion on 5C phase 1 unit on 7/6/2021    - Remains on allopurinol.     Rituxan (biosimilar) reaction 6/24: Previously tolerated rituxan many times. Received 2 doses epinephrine, albuterol, and methylpred. Required up to 6L of oxygen. Now resolved.      2.  HEME: Keep hgb >7g/dL, plt<10,000  - pancytopenic secondary to chemo/disease. RBCs today. Pt requested tylenol and benadryl pre-meds.        3.  :   Initial radical prostatectomy and bilateral lymph node dissection. November 15, 2017. Prostatic adenocarcinoma [place] 4+3 = 7 with tertiary pattern 5. Tumor involves 45% of total surface area. Positive for extensive extraprostatic extension. Positive for bilateral seminal vesicle invasion. Multiple margins  "positive. Lymphovascular invasion not identified. Perineural invasion was noted. Lymph nodes negative. 3 were examined (2 right obturator and 1 left obturator).    Completed radiation to the prostate fossa and pelvic lymph nodes at [facility] early May 2018. He received 4500 cGy in 25 fractions. He then had 2340 cGy boost in 13 fractions to the prostatic fossa, for a total dose of 6240 cGy in 38 treatment fractions delivered over 54 days. He did not receive hormonal therapy:       4.  ID:   - prophy Lev/ACV/fluc.    - C diff colitis: finished vancomycin 6/23, treated for 7 days. Now that he is neutropenic and lev started vancomycin BID 6/30.         5.  FEN/Renal:   - Cr stable  - Volume Overload: improved after lasix administration inpatient. Weight down 15 lbs on discharge. Monitor in clinic. Given 20mg IV lasix with blood products. Weight is down to   - Monitor TLS labs  - Remains on allopurinol.        6. GI:  Fullness to left groin: no palpable mass but general fullness. Per pt hx appears to fluctuate. Possible inguinal hernia vs. Lymphoma. If persists or becomes larger could pursue ultrasound for further imaging.   Abdominal Pain secondary to cancer- oxycodone prn. Aware not to take with ativan. Refilled oxycodone 6/29, 15 tablets   Transaminitis: suspect related to cancer. Resolving     7. CV: Cardiology consulted for SOB, elevated troponin during hospital admission 6/24. Per their note: \"Likely Type II MI in setting of demand ischemia from tachycardia, acute on chronic anemia, acute on chronic illness. No evidence of heart failure at this time.\"    - LE US neg for DVT.   - Echo 6/25 (murmur noted on 6/27): EF 55-60%, NL ventricular function.  - Fasting lipid panel 6/27 with total cholesterol 157, high triglycerides at 362   - Recommend checking lipid profile and outpatient follow up for non-obstructive CAD workup when he is more stable. Cardiology follow up in 1 month.        RTC: 7/3 for labs, " provider visit, and possible blood/plts.   7/6 labs, provider visit and infusion for possible blood/plts.     30 minutes spent on the date of the encounter doing chart review, history and exam, documentation and further activities per the note    Topher Haro PA-C  [phone]

## 2021-07-01 NOTE — NURSING NOTE
"Oncology Rooming Note    July 1, 2021 1:17 PM   Juvenal Blake is a 55 year old male who presents for:    Chief Complaint   Patient presents with     Blood Draw     Labs drawn via CVC by RN in lab. VS taken.      RECHECK     FOLLICULAR LYMPHOMA     Initial Vitals: /67   Pulse 91   Temp 98  F (36.7  C) (Oral)   Resp 16   Wt 107.3 kg (236 lb 9.6 oz)   SpO2 98%   BMI 35.45 kg/m   Estimated body mass index is 35.45 kg/m  as calculated from the following:    Height as of 6/25/21: 1.74 m (5' 8.5\").    Weight as of this encounter: 107.3 kg (236 lb 9.6 oz). Body surface area is 2.28 meters squared.  Mild Pain (3) Comment: Data Unavailable   No LMP for male patient.  Allergies reviewed: Yes  Medications reviewed: Yes    Medications: MEDICATION REFILLS NEEDED TODAY. Provider was notified.  Pharmacy name entered into [employer]: [facility] #[id number] - [place], [place] - [address] AT [facility]    Clinical concerns: PATIENT NEEDS A REFILL OF LORAZEPAM       Suri Crabtree CMA              "

## 2021-07-01 NOTE — NURSING NOTE
Chief Complaint   Patient presents with     Infusion     here for RBC transfusion HX: Follicular Lymphoma

## 2021-07-01 NOTE — LETTER
7/1/2021         RE: Juvenal Blake  [address] [place] [address]        Dear Colleague,    Thank you for referring your patient, Juvenal Blake, to the [facility] [place]. Please see a copy of my visit note below.    Infusion Nursing Note:  Juvenal Blake presents today for transfusion of RBC x1 unit HX: Follicular Lymphoma    Patient seen by provider today: Yes: Topher GUZMAN   present during visit today: Not Applicable.    Note: Pt s/p provider visit in BMT clinic.  Pt ambulatory upon arrival and remains alert and oriented to plan of care.  Pt reports low grade nausea, and even through his Blood Plan does not show order for premeds, pt is requesting he gets Tylenol and Benadryl prior to transfusion.  NAYA paged and order received.  Will monitor tolerance of transfusion.      Intravenous Access:  PICC. Dressing change completed by Sabine JARAMILLO RN.  Lumen flushed with saline and heparin upon completion of use today.    Treatment Conditions:  Results reviewed, labs MET treatment parameters, ok to proceed with treatment.      Post Infusion Assessment:  Patient tolerated transfusion without incident. Pt denies increase in nausea throughout infusion and time in BMT INFUSION.      Discharge Plan:   Patient discharged in stable condition accompanied by: self.  Pt scheduled to return to BMT clinic on Saturday this weekend.      Itzel Zavaleta RN                          Again, thank you for allowing me to participate in the care of your patient.        Sincerely,        [facility]

## 2021-07-01 NOTE — PROGRESS NOTES
Infusion Nursing Note:  Juvenal Blake presents today for transfusion of RBC x1 unit HX: Follicular Lymphoma    Patient seen by provider today: Yes: Topher GUZMAN   present during visit today: Not Applicable.    Note: Pt s/p provider visit in BMT clinic.  Pt ambulatory upon arrival and remains alert and oriented to plan of care.  Pt reports low grade nausea, and even through his Blood Plan does not show order for premeds, pt is requesting he gets Tylenol and Benadryl prior to transfusion.  NAYA paged and order received.  Will monitor tolerance of transfusion.      Intravenous Access:  PICC. Dressing change completed by Sabine JARAMILLO RN.  Lumen flushed with saline and heparin upon completion of use today.    Treatment Conditions:  Results reviewed, labs MET treatment parameters, ok to proceed with treatment.      Post Infusion Assessment:  Patient tolerated transfusion without incident. Pt denies increase in nausea throughout infusion and time in BMT INFUSION.      Discharge Plan:   Patient discharged in stable condition accompanied by: self.  Pt scheduled to return to BMT clinic on Saturday this weekend.      Itzel Zavaleta RN

## 2021-07-01 NOTE — NURSING NOTE
Chief Complaint   Patient presents with     Blood Draw     Labs drawn via CVC by RN in lab. VS taken.      Labs drawn via CVC, caps changed. Line flushed and Heparin locked. Vital signs taken. Checked into next appointment.   Gloria Holbrook RN

## 2021-07-01 NOTE — LETTER
7/1/2021         RE: Juvenal Blake  [address] [place] [address]        Dear Colleague,    Thank you for referring your patient, Juvenal Blake, to the [facility]PL[facility] [place]. Please see a copy of my visit note below.    BMT Daily Progress  Note      Juvenal Blake is a 55 year old male PMH significant for refractory NHL, undergoing  NK infusion. Admitted with rituxan (biosimilar) infusion reaction 6/24 and remained admitted through completion of lymphodepleting chemo and  NK cell infusion.  Currently day +4     HPI: Feels overall well. Swelling/belly discomfort stable. No CP or SOB. Notes a enlarged nodule in his left groin last night. Is seemed firm last night but not as prominent this morning. No pain with this. Areas of known lymphoma are less prominent and not as noticeable. Has a hx of hernias in the past as well. No worsening swelling in his lower extremities. No fevers, chills, or infectious symptoms. No bleeding.      ROS:  8 point ROS negative except as above.      Physical Exam:   /67   Pulse 91   Temp 98  F (36.7  C) (Oral)   Resp 16   Wt 107.3 kg (236 lb 9.6 oz)   SpO2 98%   BMI 35.45 kg/m      Wt Readings from Last 5 Encounters:   07/01/21 107.3 kg (236 lb 9.6 oz)   06/30/21 108.3 kg (238 lb 11.2 oz)   06/29/21 110.8 kg (244 lb 4.8 oz)   06/28/21 109 kg (240 lb 4.8 oz)   06/24/21 116.4 kg (256 lb 11.2 oz)     General: Conversant. ECOG 1  Eyes: PERRL, appears mildly icteric    Lungs: CTA bilaterally, decreased breath sounds bilaterally; no wheeze.   Cardiovascular: RRR  Abdominal/Rectal: ++protuberant, +firm to palpation. Not tender.  BS present. General fullness to left groin comparable to right but no discrete mass.   Lymphatics: 1+ pedal edema  Skin: no rash; scattered ecchymosis.    Neuro: A&O/ CN intact (II-XII)  Additional Findings: PICC; portacath c/d/i      ICANS: 10/11 (7/1).  Patient did not bring writing log with  him today, he wrote sentence on a sheet of paper and looked stable. Needs assessment again on 7/6.        Labs:  Lab Results   Component Value Date    WBC 0.7 (LL) 07/01/2021    ANEU 0.2 (LL) 07/01/2021    HGB 6.6 (LL) 07/01/2021    HCT 20.6 (L) 07/01/2021    PLT 20 (LL) 07/01/2021     07/01/2021    POTASSIUM 4.1 07/01/2021    CHLORIDE 104 07/01/2021    CO2 28 07/01/2021     (H) 07/01/2021    BUN 15 07/01/2021    CR 1.14 07/01/2021    MAG 2.0 07/01/2021    INR 1.07 06/28/2021    BILITOTAL 0.9 07/01/2021    AST 19 07/01/2021    ALT 39 07/01/2021    ALKPHOS 190 (H) 07/01/2021    PROTTOTAL 5.6 (L) 07/01/2021    ALBUMIN 2.5 (L) 07/01/2021       I have assessed all abnormal lab values for their clinical significance and any values considered clinically significant have been addressed in the assessment and plan.         Assessment and Plan:   Juvenal Blake is a 55 year old male PMH significant for refractory NHL, undergoing  NK infusion. Admitted with rituxan (biosimilar) infusion reaction 6/24 and remained admitted through completion of lymphodepleting chemo and  NK cell infusion.  Currently day +4        1.  Follicular Lymphoma:  - See previous notes regarding prior therapy. Most recently received Haplo NAM-NK therapy 9/2020 and Iidelaisib 10/2020 through 6/2021.  - Now on  therapy.   - s/p Flu/Cy/rituxan LD chemo (Rituxan infusion abbreviated d/t severe reaction. See below).   -  infusion 6/28. Currently day +4  - ICANS 10/10 today  - Repeat cell infusion on 5C phase 1 unit on 7/6/2021    - Remains on allopurinol.     Rituxan (biosimilar) reaction 6/24: Previously tolerated rituxan many times. Received 2 doses epinephrine, albuterol, and methylpred. Required up to 6L of oxygen. Now resolved.      2.  HEME: Keep hgb >7g/dL, plt<10,000  - pancytopenic secondary to chemo/disease. RBCs today. Pt requested tylenol and benadryl pre-meds.        3.  :   Initial radical prostatectomy and  "bilateral lymph node dissection. November 15, 2017. Prostatic adenocarcinoma Maricel 4+3 = 7 with tertiary pattern 5. Tumor involves 45% of total surface area. Positive for extensive extraprostatic extension. Positive for bilateral seminal vesicle invasion. Multiple margins positive. Lymphovascular invasion not identified. Perineural invasion was noted. Lymph nodes negative. 3 were examined (2 right obturator and 1 left obturator).    Completed radiation to the prostate fossa and pelvic lymph nodes at [facility] early May 2018. He received 4500 cGy in 25 fractions. He then had 2340 cGy boost in 13 fractions to the prostatic fossa, for a total dose of 6240 cGy in 38 treatment fractions delivered over 54 days. He did not receive hormonal therapy:       4.  ID:   - prophy Lev/ACV/fluc.    - C diff colitis: finished vancomycin 6/23, treated for 7 days. Now that he is neutropenic and lev started vancomycin BID 6/30.         5.  FEN/Renal:   - Cr stable  - Volume Overload: improved after lasix administration inpatient. Weight down 15 lbs on discharge. Monitor in clinic. Given 20mg IV lasix with blood products. Weight is down to   - Monitor TLS labs  - Remains on allopurinol.        6. GI:  Fullness to left groin: no palpable mass but general fullness. Per pt hx appears to fluctuate. Possible inguinal hernia vs. Lymphoma. If persists or becomes larger could pursue ultrasound for further imaging.   Abdominal Pain secondary to cancer- oxycodone prn. Aware not to take with ativan. Refilled oxycodone 6/29, 15 tablets   Transaminitis: suspect related to cancer. Resolving     7. CV: Cardiology consulted for SOB, elevated troponin during hospital admission 6/24. Per their note: \"Likely Type II MI in setting of demand ischemia from tachycardia, acute on chronic anemia, acute on chronic illness. No evidence of heart failure at this time.\"    - LE US neg for DVT.   - Echo 6/25 (murmur noted on 6/27): EF 55-60%, NL " ventricular function.  - Fasting lipid panel 6/27 with total cholesterol 157, high triglycerides at 362   - Recommend checking lipid profile and outpatient follow up for non-obstructive CAD workup when he is more stable. Cardiology follow up in 1 month.        RTC: 7/3 for labs, provider visit, and possible blood/plts.   7/6 labs, provider visit and infusion for possible blood/plts.     30 minutes spent on the date of the encounter doing chart review, history and exam, documentation and further activities per the note    Topher Haro PA-C  [phone]      Again, thank you for allowing me to participate in the care of your patient.        Sincerely,        BMT Advanced Practice Provider

## 2021-07-02 ENCOUNTER — AMBULATORY - HEALTHEAST (OUTPATIENT)
Dept: LAB | Facility: CLINIC | Age: 56
End: 2021-07-02

## 2021-07-02 DIAGNOSIS — C82.08 FOLLICULAR LYMPHOMA GRADE I, LYMPH NODES OF MULTIPLE SITES (H): ICD-10-CM

## 2021-07-02 DIAGNOSIS — C82.00 FOLLICULAR LYMPHOMA GRADE I, UNSPECIFIED BODY REGION (H): Primary | ICD-10-CM

## 2021-07-02 RX ORDER — HEPARIN SODIUM,PORCINE 10 UNIT/ML
5 VIAL (ML) INTRAVENOUS
Status: CANCELLED | OUTPATIENT
Start: 2021-07-02

## 2021-07-02 RX ORDER — DIPHENHYDRAMINE HCL 25 MG
25 CAPSULE ORAL EVERY 6 HOURS PRN
Status: CANCELLED
Start: 2021-07-02

## 2021-07-02 RX ORDER — HEPARIN SODIUM (PORCINE) LOCK FLUSH IV SOLN 100 UNIT/ML 100 UNIT/ML
5 SOLUTION INTRAVENOUS
Status: CANCELLED | OUTPATIENT
Start: 2021-07-02

## 2021-07-02 RX ORDER — ACETAMINOPHEN 325 MG/1
650 TABLET ORAL EVERY 4 HOURS PRN
Status: CANCELLED
Start: 2021-07-02

## 2021-07-03 ENCOUNTER — INFUSION THERAPY VISIT (OUTPATIENT)
Dept: TRANSPLANT | Facility: CLINIC | Age: 56
End: 2021-07-03
Payer: COMMERCIAL

## 2021-07-03 ENCOUNTER — APPOINTMENT (OUTPATIENT)
Dept: LAB | Facility: CLINIC | Age: 56
End: 2021-07-03
Payer: COMMERCIAL

## 2021-07-03 ENCOUNTER — COMMUNICATION - HEALTHEAST (OUTPATIENT)
Dept: SCHEDULING | Facility: CLINIC | Age: 56
End: 2021-07-03

## 2021-07-03 ENCOUNTER — ONCOLOGY VISIT (OUTPATIENT)
Dept: TRANSPLANT | Facility: CLINIC | Age: 56
End: 2021-07-03
Payer: COMMERCIAL

## 2021-07-03 VITALS
OXYGEN SATURATION: 98 % | WEIGHT: 233 LBS | DIASTOLIC BLOOD PRESSURE: 76 MMHG | BODY MASS INDEX: 34.91 KG/M2 | RESPIRATION RATE: 16 BRPM | SYSTOLIC BLOOD PRESSURE: 122 MMHG | HEART RATE: 77 BPM | TEMPERATURE: 98.4 F

## 2021-07-03 DIAGNOSIS — C82.08 FOLLICULAR LYMPHOMA GRADE I, LYMPH NODES OF MULTIPLE SITES (H): ICD-10-CM

## 2021-07-03 DIAGNOSIS — R52 PAIN: ICD-10-CM

## 2021-07-03 DIAGNOSIS — C82.08 FOLLICULAR LYMPHOMA GRADE I, LYMPH NODES OF MULTIPLE SITES (H): Primary | ICD-10-CM

## 2021-07-03 LAB
ANION GAP SERPL CALCULATED.3IONS-SCNC: 6 MMOL/L (ref 3–14)
BASOPHILS # BLD AUTO: 0 10E9/L (ref 0–0.2)
BASOPHILS NFR BLD AUTO: 1.8 %
BLD PROD TYP BPU: NORMAL
BLD UNIT ID BPU: 0
BLOOD PRODUCT CODE: NORMAL
BPU ID: NORMAL
BUN SERPL-MCNC: 12 MG/DL (ref 7–30)
CALCIUM SERPL-MCNC: 8.9 MG/DL (ref 8.5–10.1)
CHLORIDE SERPL-SCNC: 104 MMOL/L (ref 94–109)
CO2 SERPL-SCNC: 29 MMOL/L (ref 20–32)
CREAT SERPL-MCNC: 1.1 MG/DL (ref 0.66–1.25)
DIFFERENTIAL METHOD BLD: ABNORMAL
EOSINOPHIL # BLD AUTO: 0.1 10E9/L (ref 0–0.7)
EOSINOPHIL NFR BLD AUTO: 6.1 %
ERYTHROCYTE [DISTWIDTH] IN BLOOD BY AUTOMATED COUNT: 16.1 % (ref 10–15)
GFR SERPL CREATININE-BSD FRML MDRD: 74 ML/MIN/{1.73_M2}
GLUCOSE SERPL-MCNC: 107 MG/DL (ref 70–99)
HCT VFR BLD AUTO: 23.5 % (ref 40–53)
HGB BLD-MCNC: 7.6 G/DL (ref 13.3–17.7)
LYMPHOCYTES # BLD AUTO: 1.1 10E9/L (ref 0.8–5.3)
LYMPHOCYTES NFR BLD AUTO: 73.7 %
MCH RBC QN AUTO: 29.6 PG (ref 26.5–33)
MCHC RBC AUTO-ENTMCNC: 32.3 G/DL (ref 31.5–36.5)
MCV RBC AUTO: 91 FL (ref 78–100)
MONOCYTES # BLD AUTO: 0.1 10E9/L (ref 0–1.3)
MONOCYTES NFR BLD AUTO: 9.6 %
NEUTROPHILS # BLD AUTO: 0.1 10E9/L (ref 1.6–8.3)
NEUTROPHILS NFR BLD AUTO: 8.8 %
PLATELET # BLD AUTO: 34 10E9/L (ref 150–450)
PLATELET # BLD EST: ABNORMAL 10*3/UL
POTASSIUM SERPL-SCNC: 4.2 MMOL/L (ref 3.4–5.3)
RBC # BLD AUTO: 2.57 10E12/L (ref 4.4–5.9)
RBC MORPH BLD: NORMAL
SARS-COV-2 PCR COMMENT: NORMAL
SARS-COV-2 RNA SPEC QL NAA+PROBE: NEGATIVE
SARS-COV-2 VIRUS SPECIMEN SOURCE: NORMAL
SODIUM SERPL-SCNC: 138 MMOL/L (ref 133–144)
TRANSFUSION STATUS PATIENT QL: NORMAL
TRANSFUSION STATUS PATIENT QL: NORMAL
WBC # BLD AUTO: 1.5 10E9/L (ref 4–11)

## 2021-07-03 PROCEDURE — 86900 BLOOD TYPING SEROLOGIC ABO: CPT | Performed by: PHYSICIAN ASSISTANT

## 2021-07-03 PROCEDURE — G0463 HOSPITAL OUTPT CLINIC VISIT: HCPCS

## 2021-07-03 PROCEDURE — 85025 COMPLETE CBC W/AUTO DIFF WBC: CPT | Performed by: PHYSICIAN ASSISTANT

## 2021-07-03 PROCEDURE — 86850 RBC ANTIBODY SCREEN: CPT | Performed by: PHYSICIAN ASSISTANT

## 2021-07-03 PROCEDURE — 36592 COLLECT BLOOD FROM PICC: CPT

## 2021-07-03 PROCEDURE — 86901 BLOOD TYPING SEROLOGIC RH(D): CPT | Performed by: PHYSICIAN ASSISTANT

## 2021-07-03 PROCEDURE — 250N000011 HC RX IP 250 OP 636

## 2021-07-03 PROCEDURE — 99214 OFFICE O/P EST MOD 30 MIN: CPT

## 2021-07-03 PROCEDURE — 86923 COMPATIBILITY TEST ELECTRIC: CPT | Performed by: PHYSICIAN ASSISTANT

## 2021-07-03 PROCEDURE — 80048 BASIC METABOLIC PNL TOTAL CA: CPT | Performed by: PHYSICIAN ASSISTANT

## 2021-07-03 RX ORDER — LORAZEPAM 0.5 MG/1
0.5 TABLET ORAL
Qty: 15 TABLET | Refills: 0 | Status: SHIPPED | OUTPATIENT
Start: 2021-07-03 | End: 2021-08-17

## 2021-07-03 RX ORDER — HEPARIN SODIUM,PORCINE 10 UNIT/ML
5 VIAL (ML) INTRAVENOUS
Status: DISCONTINUED | OUTPATIENT
Start: 2021-07-03 | End: 2021-07-04 | Stop reason: HOSPADM

## 2021-07-03 RX ADMIN — Medication 5 ML: at 10:01

## 2021-07-03 ASSESSMENT — PAIN SCALES - GENERAL: PAINLEVEL: MILD PAIN (2)

## 2021-07-03 NOTE — ADDENDUM NOTE
Addendum Note by Claudine Truong RN at 1/22/2018  1:39 PM     Author: Claudine Truong RN Service: -- Author Type: Registered Nurse    Filed: 1/22/2018  1:39 PM Encounter Date: 1/22/2018 Status: Signed    : Claudine Truong RN (Registered Nurse)    Addended by: CLAUDINE TRUONG on: 1/22/2018 01:39 PM        Modules accepted: Orders

## 2021-07-03 NOTE — PROGRESS NOTES
BMT Daily Progress  Note      Juvenal Blake is a 55 year old male PMH significant for refractory NHL, undergoing  NK infusion. Admitted with rituxan (biosimilar) infusion reaction 6/24 and remained admitted through completion of lymphodepleting chemo and  NK cell infusion.  Currently day +6     HPI: Feels overall well. States he feels better than his last visit- less tired. Some nausea, but able to keep food and fluids down     ROS:  8 point ROS negative except as above.      Physical Exam:   /76   Pulse 77   Temp 98.4  F (36.9  C) (Oral)   Resp 16   Wt 105.7 kg (233 lb)   SpO2 98%   BMI 34.91 kg/m      Wt Readings from Last 5 Encounters:   07/03/21 105.7 kg (233 lb)   07/01/21 107.3 kg (236 lb 9.6 oz)   06/30/21 108.3 kg (238 lb 11.2 oz)   06/29/21 110.8 kg (244 lb 4.8 oz)   06/28/21 109 kg (240 lb 4.8 oz)     General: Conversant. ECOG 1  Eyes: PERRL, appears mildly icteric    Lungs: CTA bilaterally, decreased breath sounds bilaterally; no wheeze.   Cardiovascular: RRR  Abdominal/Rectal: ++protuberant, +firm to palpation. Not tender.  BS present. General fullness to left groin comparable to right but no discrete mass.   Lymphatics: 1+ pedal edema  Skin: no rash; scattered ecchymosis.    Neuro: A&O/ CN intact (II-XII)  Additional Findings: PICC; portacath c/d/i             Labs:  Lab Results   Component Value Date    WBC 1.5 (L) 07/03/2021    ANEU 0.2 (LL) 07/01/2021    HGB 7.6 (L) 07/03/2021    HCT 23.5 (L) 07/03/2021    PLT 34 (LL) 07/03/2021     07/03/2021    POTASSIUM 4.2 07/03/2021    CHLORIDE 104 07/03/2021    CO2 29 07/03/2021     (H) 07/03/2021    BUN 12 07/03/2021    CR 1.10 07/03/2021    MAG 2.0 07/01/2021    INR 1.07 06/28/2021    BILITOTAL 0.9 07/01/2021    AST 19 07/01/2021    ALT 39 07/01/2021    ALKPHOS 190 (H) 07/01/2021    PROTTOTAL 5.6 (L) 07/01/2021    ALBUMIN 2.5 (L) 07/01/2021       I have assessed all abnormal lab values for their clinical significance and  any values considered clinically significant have been addressed in the assessment and plan.         Assessment and Plan:   Juvenal Blake is a 55 year old male PMH significant for refractory NHL, undergoing  NK infusion. Admitted with rituxan (biosimilar) infusion reaction 6/24 and remained admitted through completion of lymphodepleting chemo and  NK cell infusion.  Currently day +6        1.  Follicular Lymphoma:  - See previous notes regarding prior therapy. Most recently received Haplo NAM-NK therapy 9/2020 and Iidelaisib 10/2020 through 6/2021.  - Now on  therapy.   - s/p Flu/Cy/rituxan LD chemo (Rituxan infusion abbreviated d/t severe reaction. See below).   -  infusion 6/28. Currently day +6  - ICANS 10/10 today  - Repeat cell infusion on 5C phase 1 unit on 7/6/2021    - Remains on allopurinol.     Rituxan (biosimilar) reaction 6/24: Previously tolerated rituxan many times. Received 2 doses epinephrine, albuterol, and methylpred. Required up to 6L of oxygen. Now resolved.      2.  HEME: Keep hgb >7g/dL, plt<10,000  - pancytopenic secondary to chemo/disease.      3.  :   Initial radical prostatectomy and bilateral lymph node dissection. November 15, 2017. Prostatic adenocarcinoma Maricel 4+3 = 7 with tertiary pattern 5. Tumor involves 45% of total surface area. Positive for extensive extraprostatic extension. Positive for bilateral seminal vesicle invasion. Multiple margins positive. Lymphovascular invasion not identified. Perineural invasion was noted. Lymph nodes negative. 3 were examined (2 right obturator and 1 left obturator).    Completed radiation to the prostate fossa and pelvic lymph nodes at [facility] early May 2018. He received 4500 cGy in 25 fractions. He then had 2340 cGy boost in 13 fractions to the prostatic fossa, for a total dose of 6240 cGy in 38 treatment fractions delivered over 54 days. He did not receive hormonal therapy:       4.  ID:   - prophy  "Lev/ACV/fluc.    - C diff colitis: finished vancomycin 6/23, treated for 7 days. Now that he is neutropenic and lev started vancomycin BID 6/30.         5.  FEN/Renal:   - Cr stable  - Volume Overload: improved after lasix administration inpatient. Weight down 15 lbs on discharge. Monitor in clinic. - Monitor TLS labs  - Remains on allopurinol.        6. GI:  Fullness to left groin: no palpable mass but general fullness. Per pt hx appears to fluctuate. Possible inguinal hernia vs. Lymphoma. If persists or becomes larger could pursue ultrasound for further imaging.   Abdominal Pain secondary to cancer- oxycodone prn. Aware not to take with ativan. Refilled oxycodone 6/29, 15 tablets   Transaminitis: suspect related to cancer. Resolving     7. CV: Cardiology consulted for SOB, elevated troponin during hospital admission 6/24. Per their note: \"Likely Type II MI in setting of demand ischemia from tachycardia, acute on chronic anemia, acute on chronic illness. No evidence of heart failure at this time.\"    - LE US neg for DVT.   - Echo 6/25 (murmur noted on 6/27): EF 55-60%, NL ventricular function.  - Fasting lipid panel 6/27 with total cholesterol 157, high triglycerides at 362   - Recommend checking lipid profile and outpatient follow up for non-obstructive CAD workup when he is more stable. Cardiology follow up in 1 month.        RTC:  7/6 labs, provider visit and infusion for possible blood/plts.     Inga Selby    "

## 2021-07-03 NOTE — NURSING NOTE
Chief Complaint   Patient presents with     Infusion     Possible infusion s/p BMT r/t Lymphoma     Lab results monitored; no needs    Apryl Vallejo RN

## 2021-07-03 NOTE — LETTER
7/3/2021         RE: Juvenal Blake  [address] [place] [address]        Dear Colleague,    Thank you for referring your patient, Juvenal Blake, to the [facility] BLOOD AND MARROW TRANSPLANT PROGRAM [place]. Please see a copy of my visit note below.    BMT Daily Progress  Note      Juvenal Blake is a 55 year old male PMH significant for refractory NHL, undergoing  NK infusion. Admitted with rituxan (biosimilar) infusion reaction 6/24 and remained admitted through completion of lymphodepleting chemo and  NK cell infusion.  Currently day +6     HPI: Feels overall well. States he feels better than his last visit- less tired. Some nausea, but able to keep food and fluids down     ROS:  8 point ROS negative except as above.      Physical Exam:   /76   Pulse 77   Temp 98.4  F (36.9  C) (Oral)   Resp 16   Wt 105.7 kg (233 lb)   SpO2 98%   BMI 34.91 kg/m      Wt Readings from Last 5 Encounters:   07/03/21 105.7 kg (233 lb)   07/01/21 107.3 kg (236 lb 9.6 oz)   06/30/21 108.3 kg (238 lb 11.2 oz)   06/29/21 110.8 kg (244 lb 4.8 oz)   06/28/21 109 kg (240 lb 4.8 oz)     General: Conversant. ECOG 1  Eyes: PERRL, appears mildly icteric    Lungs: CTA bilaterally, decreased breath sounds bilaterally; no wheeze.   Cardiovascular: RRR  Abdominal/Rectal: ++protuberant, +firm to palpation. Not tender.  BS present. General fullness to left groin comparable to right but no discrete mass.   Lymphatics: 1+ pedal edema  Skin: no rash; scattered ecchymosis.    Neuro: A&O/ CN intact (II-XII)  Additional Findings: PICC; portacath c/d/i             Labs:  Lab Results   Component Value Date    WBC 1.5 (L) 07/03/2021    ANEU 0.2 (LL) 07/01/2021    HGB 7.6 (L) 07/03/2021    HCT 23.5 (L) 07/03/2021    PLT 34 (LL) 07/03/2021     07/03/2021    POTASSIUM 4.2 07/03/2021    CHLORIDE 104 07/03/2021    CO2 29 07/03/2021     (H) 07/03/2021    BUN 12 07/03/2021    CR 1.10 07/03/2021    MAG 2.0  07/01/2021    INR 1.07 06/28/2021    BILITOTAL 0.9 07/01/2021    AST 19 07/01/2021    ALT 39 07/01/2021    ALKPHOS 190 (H) 07/01/2021    PROTTOTAL 5.6 (L) 07/01/2021    ALBUMIN 2.5 (L) 07/01/2021       I have assessed all abnormal lab values for their clinical significance and any values considered clinically significant have been addressed in the assessment and plan.         Assessment and Plan:   Juvenal Blake is a 55 year old male PMH significant for refractory NHL, undergoing  NK infusion. Admitted with rituxan (biosimilar) infusion reaction 6/24 and remained admitted through completion of lymphodepleting chemo and  NK cell infusion.  Currently day +6        1.  Follicular Lymphoma:  - See previous notes regarding prior therapy. Most recently received Haplo NAM-NK therapy 9/2020 and Iidelaisib 10/2020 through 6/2021.  - Now on  therapy.   - s/p Flu/Cy/rituxan LD chemo (Rituxan infusion abbreviated d/t severe reaction. See below).   -  infusion 6/28. Currently day +6  - ICANS 10/10 today  - Repeat cell infusion on 5C phase 1 unit on 7/6/2021    - Remains on allopurinol.     Rituxan (biosimilar) reaction 6/24: Previously tolerated rituxan many times. Received 2 doses epinephrine, albuterol, and methylpred. Required up to 6L of oxygen. Now resolved.      2.  HEME: Keep hgb >7g/dL, plt<10,000  - pancytopenic secondary to chemo/disease.      3.  :   Initial radical prostatectomy and bilateral lymph node dissection. November 15, 2017. Prostatic adenocarcinoma [place] 4+3 = 7 with tertiary pattern 5. Tumor involves 45% of total surface area. Positive for extensive extraprostatic extension. Positive for bilateral seminal vesicle invasion. Multiple margins positive. Lymphovascular invasion not identified. Perineural invasion was noted. Lymph nodes negative. 3 were examined (2 right obturator and 1 left obturator).    Completed radiation to the prostate fossa and pelvic lymph nodes at [place]  [facility] early May 2018. He received 4500 cGy in 25 fractions. He then had 2340 cGy boost in 13 fractions to the prostatic fossa, for a total dose of 6240 cGy in 38 treatment fractions delivered over 54 days. He did not receive hormonal therapy:       4.  ID:   - prophy Lev/ACV/fluc.    - C diff colitis: finished vancomycin 6/23, treated for 7 days. Now that he is neutropenic and lev started vancomycin BID 6/30.         5.  FEN/Renal:   - Cr stable  - Volume Overload: improved after lasix administration inpatient. Weight down 15 lbs on discharge. Monitor in clinic. - Monitor TLS labs  - Remains on allopurinol.        6. GI:  Fullness to left groin: no palpable mass but general fullness. Per pt hx appears to fluctuate. Possible inguinal hernia vs. Lymphoma. If persists or becomes larger could pursue ultrasound for further imaging.   Abdominal Pain secondary to cancer- oxycodone prn. Aware not to take with ativan. Refilled oxycodone 6/29, 15 tablets   Transaminitis: suspect related to cancer. Resolving     7. CV: Cardiology consulted for SOB, elevated troponin during hospital admission 6/24. Per their note: \"Likely Type II MI in setting of demand ischemia from tachycardia, acute on chronic anemia, acute on chronic illness. No evidence of heart failure at this time.\"    - LE US neg for DVT.   - Echo 6/25 (murmur noted on 6/27): EF 55-60%, NL ventricular function.  - Fasting lipid panel 6/27 with total cholesterol 157, high triglycerides at 362   - Recommend checking lipid profile and outpatient follow up for non-obstructive CAD workup when he is more stable. Cardiology follow up in 1 month.        RTC:  7/6 labs, provider visit and infusion for possible blood/plts.     Inga Selby        Again, thank you for allowing me to participate in the care of your patient.        Sincerely,        BMT DOM    "

## 2021-07-03 NOTE — ADDENDUM NOTE
Addendum Note by Estrella Blancas RN at 11/10/2017  9:44 AM     Author: Estrella Blancas RN Service: -- Author Type: Registered Nurse    Filed: 11/10/2017  9:44 AM Encounter Date: 8/14/2017 Status: Signed    : Estrella Blancas RN (Registered Nurse)    Addended by: ESTRELLA BLANCAS on: 11/10/2017 09:44 AM        Modules accepted: Orders

## 2021-07-03 NOTE — ADDENDUM NOTE
Addendum Note by Estrella Blancas RN at 1/20/2021  9:15 AM     Author: Estrella Blancas RN Service: -- Author Type: Registered Nurse    Filed: 1/20/2021 10:54 AM Encounter Date: 1/20/2021 Status: Signed    : Estrella Blancas RN (Registered Nurse)    Addended by: ESTRELLA BLANCAS on: 1/20/2021 10:54 AM        Modules accepted: Orders

## 2021-07-03 NOTE — LETTER
7/3/2021         RE: Juvenal Blake  [address] [place] [address]        Dear Colleague,    Thank you for referring your patient, Juvenal Blake, to the [facility] BLOOD AND MARROW TRANSPLANT PROGRAM [place]. Please see a copy of my visit note below.    Chief Complaint   Patient presents with     Infusion     Possible infusion s/p BMT r/t Lymphoma     Lab results monitored; No transfusions needed    Apryl Vallejo, RN        Again, thank you for allowing me to participate in the care of your patient.        Sincerely,        [facility] [address] [facility]

## 2021-07-03 NOTE — PROGRESS NOTES
Chief Complaint   Patient presents with     Infusion     Possible infusion s/p BMT r/t Lymphoma     Lab results monitored; No transfusions needed    Apryl Vallejo RN

## 2021-07-04 ENCOUNTER — COMMUNICATION - HEALTHEAST (OUTPATIENT)
Dept: SCHEDULING | Facility: CLINIC | Age: 56
End: 2021-07-04

## 2021-07-04 NOTE — ADDENDUM NOTE
Addendum Note by Piper Oscar RN at 5/21/2021  8:30 AM     Author: Piper Oscar RN Service: -- Author Type: Registered Nurse    Filed: 5/21/2021 11:17 AM Encounter Date: 5/21/2021 Status: Signed    : Piper Oscar RN (Registered Nurse)    Addended by: PIPER OSCAR on: 5/21/2021 11:17 AM        Modules accepted: Orders, SmartSet

## 2021-07-05 LAB
ABO + RH BLD: NORMAL
ABO + RH BLD: NORMAL
BLD GP AB SCN SERPL QL: NORMAL
BLD PROD TYP BPU: NORMAL
BLOOD BANK CMNT PATIENT-IMP: NORMAL
NUM BPU REQUESTED: 2
SPECIMEN EXP DATE BLD: NORMAL

## 2021-07-06 ENCOUNTER — ONCOLOGY VISIT (OUTPATIENT)
Dept: TRANSPLANT | Facility: CLINIC | Age: 56
End: 2021-07-06
Attending: PHYSICIAN ASSISTANT
Payer: COMMERCIAL

## 2021-07-06 ENCOUNTER — APPOINTMENT (OUTPATIENT)
Dept: LAB | Facility: CLINIC | Age: 56
End: 2021-07-06
Attending: PHYSICIAN ASSISTANT
Payer: COMMERCIAL

## 2021-07-06 ENCOUNTER — RESEARCH ENCOUNTER (OUTPATIENT)
Dept: TRANSPLANT | Facility: CLINIC | Age: 56
End: 2021-07-06

## 2021-07-06 ENCOUNTER — HOSPITAL ENCOUNTER (OUTPATIENT)
Facility: CLINIC | Age: 56
Discharge: HOME | End: 2021-07-06
Payer: COMMERCIAL

## 2021-07-06 VITALS
RESPIRATION RATE: 16 BRPM | WEIGHT: 229.3 LBS | BODY MASS INDEX: 34.35 KG/M2 | HEART RATE: 77 BPM | TEMPERATURE: 97.5 F | DIASTOLIC BLOOD PRESSURE: 62 MMHG | SYSTOLIC BLOOD PRESSURE: 121 MMHG | OXYGEN SATURATION: 100 %

## 2021-07-06 VITALS
SYSTOLIC BLOOD PRESSURE: 156 MMHG | WEIGHT: 247.2 LBS | TEMPERATURE: 98.9 F | BODY MASS INDEX: 37.59 KG/M2 | HEART RATE: 72 BPM | DIASTOLIC BLOOD PRESSURE: 99 MMHG | OXYGEN SATURATION: 96 %

## 2021-07-06 VITALS
OXYGEN SATURATION: 97 % | SYSTOLIC BLOOD PRESSURE: 117 MMHG | RESPIRATION RATE: 16 BRPM | TEMPERATURE: 97.6 F | DIASTOLIC BLOOD PRESSURE: 76 MMHG | HEART RATE: 74 BPM | BODY MASS INDEX: 33.9 KG/M2 | WEIGHT: 226.3 LBS

## 2021-07-06 DIAGNOSIS — C82.08 FOLLICULAR LYMPHOMA GRADE I, LYMPH NODES OF MULTIPLE SITES (H): Primary | ICD-10-CM

## 2021-07-06 LAB
ALBUMIN SERPL-MCNC: 2.8 G/DL (ref 3.4–5)
ALP SERPL-CCNC: 189 U/L (ref 40–150)
ALT SERPL W P-5'-P-CCNC: 49 U/L (ref 0–70)
ANION GAP SERPL CALCULATED.3IONS-SCNC: 6 MMOL/L (ref 3–14)
AST SERPL W P-5'-P-CCNC: 31 U/L (ref 0–45)
BASOPHILS # BLD AUTO: 0 10E9/L (ref 0–0.2)
BASOPHILS NFR BLD AUTO: 0 %
BILIRUB DIRECT SERPL-MCNC: 0.4 MG/DL (ref 0–0.2)
BILIRUB SERPL-MCNC: 0.8 MG/DL (ref 0.2–1.3)
BLD PROD TYP BPU: NORMAL
BLD PROD TYP BPU: NORMAL
BLD UNIT ID BPU: 0
BLD UNIT ID BPU: 0
BLOOD PRODUCT CODE: NORMAL
BLOOD PRODUCT CODE: NORMAL
BPU ID: NORMAL
BPU ID: NORMAL
BUN SERPL-MCNC: 11 MG/DL (ref 7–30)
CALCIUM SERPL-MCNC: 8.9 MG/DL (ref 8.5–10.1)
CHLORIDE SERPL-SCNC: 104 MMOL/L (ref 94–109)
CO2 SERPL-SCNC: 29 MMOL/L (ref 20–32)
CREAT SERPL-MCNC: 1.05 MG/DL (ref 0.66–1.25)
DIFFERENTIAL METHOD BLD: ABNORMAL
EOSINOPHIL # BLD AUTO: 0.1 10E9/L (ref 0–0.7)
EOSINOPHIL NFR BLD AUTO: 9 %
ERYTHROCYTE [DISTWIDTH] IN BLOOD BY AUTOMATED COUNT: 16.8 % (ref 10–15)
GFR SERPL CREATININE-BSD FRML MDRD: 78 ML/MIN/{1.73_M2}
GLUCOSE SERPL-MCNC: 118 MG/DL (ref 70–99)
HCT VFR BLD AUTO: 23.9 % (ref 40–53)
HGB BLD-MCNC: 7.8 G/DL (ref 13.3–17.7)
IMM GRANULOCYTES # BLD: 0 10E9/L (ref 0–0.4)
IMM GRANULOCYTES NFR BLD: 1.4 %
LDH SERPL L TO P-CCNC: 171 U/L (ref 85–227)
LYMPHOCYTES # BLD AUTO: 0.8 10E9/L (ref 0.8–5.3)
LYMPHOCYTES NFR BLD AUTO: 54.9 %
MAGNESIUM SERPL-MCNC: 2.2 MG/DL (ref 1.6–2.3)
MCH RBC QN AUTO: 29.5 PG (ref 26.5–33)
MCHC RBC AUTO-ENTMCNC: 32.6 G/DL (ref 31.5–36.5)
MCV RBC AUTO: 91 FL (ref 78–100)
MONOCYTES # BLD AUTO: 0.4 10E9/L (ref 0–1.3)
MONOCYTES NFR BLD AUTO: 28.5 %
NEUTROPHILS # BLD AUTO: 0.1 10E9/L (ref 1.6–8.3)
NEUTROPHILS NFR BLD AUTO: 6.2 %
NRBC # BLD AUTO: 0 10*3/UL
NRBC BLD AUTO-RTO: 0 /100
PHOSPHATE SERPL-MCNC: 3.6 MG/DL (ref 2.5–4.5)
PLATELET # BLD AUTO: 82 10E9/L (ref 150–450)
PLATELET # BLD EST: ABNORMAL 10*3/UL
POTASSIUM SERPL-SCNC: 4.1 MMOL/L (ref 3.4–5.3)
PROT SERPL-MCNC: 6 G/DL (ref 6.8–8.8)
RBC # BLD AUTO: 2.64 10E12/L (ref 4.4–5.9)
SODIUM SERPL-SCNC: 139 MMOL/L (ref 133–144)
TRANSFUSION STATUS PATIENT QL: NORMAL
WBC # BLD AUTO: 1.4 10E9/L (ref 4–11)

## 2021-07-06 PROCEDURE — 84100 ASSAY OF PHOSPHORUS: CPT

## 2021-07-06 PROCEDURE — 96372 THER/PROPH/DIAG INJ SC/IM: CPT

## 2021-07-06 PROCEDURE — 999N001121 HC STATISTIC RESEARCH KIT COLLECTION

## 2021-07-06 PROCEDURE — 80053 COMPREHEN METABOLIC PANEL: CPT

## 2021-07-06 PROCEDURE — G0463 HOSPITAL OUTPT CLINIC VISIT: HCPCS

## 2021-07-06 PROCEDURE — 250N000011 HC RX IP 250 OP 636: Performed by: PHYSICIAN ASSISTANT

## 2021-07-06 PROCEDURE — 83615 LACTATE (LD) (LDH) ENZYME: CPT

## 2021-07-06 PROCEDURE — 85025 COMPLETE CBC W/AUTO DIFF WBC: CPT

## 2021-07-06 PROCEDURE — 99213 OFFICE O/P EST LOW 20 MIN: CPT

## 2021-07-06 PROCEDURE — G0463 HOSPITAL OUTPT CLINIC VISIT: HCPCS | Mod: 25

## 2021-07-06 PROCEDURE — 250N000013 HC RX MED GY IP 250 OP 250 PS 637

## 2021-07-06 PROCEDURE — 83735 ASSAY OF MAGNESIUM: CPT

## 2021-07-06 PROCEDURE — 258N000003 HC RX IP 258 OP 636

## 2021-07-06 PROCEDURE — 82248 BILIRUBIN DIRECT: CPT

## 2021-07-06 PROCEDURE — 250N000011 HC RX IP 250 OP 636

## 2021-07-06 RX ORDER — DIPHENHYDRAMINE HYDROCHLORIDE 50 MG/ML
50 INJECTION INTRAMUSCULAR; INTRAVENOUS
Status: DISCONTINUED | OUTPATIENT
Start: 2021-07-06 | End: 2021-07-06 | Stop reason: HOSPADM

## 2021-07-06 RX ORDER — ACETAMINOPHEN 325 MG/1
650 TABLET ORAL EVERY 4 HOURS
Status: COMPLETED | OUTPATIENT
Start: 2021-07-06 | End: 2021-07-06

## 2021-07-06 RX ORDER — ALBUTEROL SULFATE 5 MG/ML
2.5 SOLUTION RESPIRATORY (INHALATION)
Status: DISCONTINUED | OUTPATIENT
Start: 2021-07-06 | End: 2021-07-06 | Stop reason: HOSPADM

## 2021-07-06 RX ORDER — EPINEPHRINE 1 MG/ML
0.3 INJECTION, SOLUTION, CONCENTRATE INTRAVENOUS EVERY 5 MIN PRN
Status: DISCONTINUED | OUTPATIENT
Start: 2021-07-06 | End: 2021-07-06 | Stop reason: HOSPADM

## 2021-07-06 RX ORDER — ALBUTEROL SULFATE 90 UG/1
1-2 AEROSOL, METERED RESPIRATORY (INHALATION)
Status: DISCONTINUED | OUTPATIENT
Start: 2021-07-06 | End: 2021-07-06 | Stop reason: HOSPADM

## 2021-07-06 RX ORDER — MEPERIDINE HYDROCHLORIDE 25 MG/ML
25 INJECTION INTRAMUSCULAR; INTRAVENOUS; SUBCUTANEOUS EVERY 30 MIN PRN
Status: DISCONTINUED | OUTPATIENT
Start: 2021-07-06 | End: 2021-07-06 | Stop reason: HOSPADM

## 2021-07-06 RX ORDER — METHYLPREDNISOLONE SODIUM SUCCINATE 125 MG/2ML
125 INJECTION, POWDER, LYOPHILIZED, FOR SOLUTION INTRAMUSCULAR; INTRAVENOUS
Status: DISCONTINUED | OUTPATIENT
Start: 2021-07-06 | End: 2021-07-06 | Stop reason: HOSPADM

## 2021-07-06 RX ORDER — HEPARIN SODIUM,PORCINE 10 UNIT/ML
5-10 VIAL (ML) INTRAVENOUS
Status: DISCONTINUED | OUTPATIENT
Start: 2021-07-06 | End: 2021-07-06 | Stop reason: HOSPADM

## 2021-07-06 RX ORDER — ACETAMINOPHEN 325 MG/1
650 TABLET ORAL ONCE
Status: DISCONTINUED | OUTPATIENT
Start: 2021-07-06 | End: 2021-07-06 | Stop reason: HOSPADM

## 2021-07-06 RX ORDER — HEPARIN SODIUM,PORCINE 10 UNIT/ML
5 VIAL (ML) INTRAVENOUS
Status: DISCONTINUED | OUTPATIENT
Start: 2021-07-06 | End: 2021-07-06 | Stop reason: HOSPADM

## 2021-07-06 RX ORDER — NALOXONE HYDROCHLORIDE 0.4 MG/ML
0.2 INJECTION, SOLUTION INTRAMUSCULAR; INTRAVENOUS; SUBCUTANEOUS
Status: DISCONTINUED | OUTPATIENT
Start: 2021-07-06 | End: 2021-07-06 | Stop reason: HOSPADM

## 2021-07-06 RX ORDER — MEPERIDINE HYDROCHLORIDE 25 MG/ML
25-50 INJECTION INTRAMUSCULAR; INTRAVENOUS; SUBCUTANEOUS
Status: DISCONTINUED | OUTPATIENT
Start: 2021-07-06 | End: 2021-07-06 | Stop reason: HOSPADM

## 2021-07-06 RX ORDER — HEPARIN SODIUM,PORCINE 10 UNIT/ML
5-10 VIAL (ML) INTRAVENOUS EVERY 24 HOURS
Status: DISCONTINUED | OUTPATIENT
Start: 2021-07-06 | End: 2021-07-06 | Stop reason: HOSPADM

## 2021-07-06 RX ORDER — DIPHENHYDRAMINE HCL 25 MG
25 CAPSULE ORAL ONCE
Status: DISCONTINUED | OUTPATIENT
Start: 2021-07-06 | End: 2021-07-06 | Stop reason: HOSPADM

## 2021-07-06 RX ORDER — DIPHENHYDRAMINE HCL 25 MG
25 CAPSULE ORAL EVERY 4 HOURS
Status: COMPLETED | OUTPATIENT
Start: 2021-07-06 | End: 2021-07-06

## 2021-07-06 RX ADMIN — SODIUM CHLORIDE 250 ML: 9 INJECTION, SOLUTION INTRAVENOUS at 12:28

## 2021-07-06 RX ADMIN — DIPHENHYDRAMINE HYDROCHLORIDE 25 MG: 25 CAPSULE ORAL at 14:20

## 2021-07-06 RX ADMIN — DIPHENHYDRAMINE HYDROCHLORIDE 25 MG: 25 CAPSULE ORAL at 10:23

## 2021-07-06 RX ADMIN — SODIUM CHLORIDE, PRESERVATIVE FREE 5 ML: 5 INJECTION INTRAVENOUS at 07:46

## 2021-07-06 RX ADMIN — Medication 10 ML: at 13:15

## 2021-07-06 RX ADMIN — ACETAMINOPHEN 650 MG: 325 TABLET, FILM COATED ORAL at 10:23

## 2021-07-06 RX ADMIN — ACETAMINOPHEN 650 MG: 325 TABLET, FILM COATED ORAL at 14:20

## 2021-07-06 RX ADMIN — SODIUM CHLORIDE 250 ML: 9 INJECTION, SOLUTION INTRAVENOUS at 10:23

## 2021-07-06 ASSESSMENT — PAIN SCALES - GENERAL: PAINLEVEL: MODERATE PAIN (4)

## 2021-07-06 NOTE — PROGRESS NOTES
Admission to Early Phase Research Unit    July 6, 2021    Clinical Trial: [place]     Principle Investigator: Rosa Terry MD    Research Nurse: Estrella Solomon RN    Common Side Effects & Recommended Intervention(s): CRS, neurotoxicity     ICANS 10/10   BMT Daily CARTOX Note (complete days 0-14 and with any subsequent CRS/neurotoxicity)    Date: July 6, 2021    Juvenal Blake ([id number]) is a 55 year old year old male who received infusion on 7/6, currently day 8 of     Vital Signs: /76 (BP Location: Left arm)   Pulse 74   Temp 97.6  F (36.4  C) (Oral)   Resp 16   Wt 102.6 kg (226 lb 4.8 oz)   SpO2 97%   BMI 33.90 kg/m      1) CRS Grading (based ONLY on parameters in box below)    Fever:   </= 100.4    Blood pressure:   SBP >/= 90 (not hypotensive)    Oxygen saturation:    >/= 90% on room air (not hypoxic)    CRS Parameter Grade 1  Grade 2 Grade 3 Grade 4   Fever* Tm >= 100.4 degrees F Tm >= 100.4 degrees F Tm >= 100.4 degrees F Tm >= to 100.4  degrees F      With Either:  Hypotension (SBP <90) None Responsive to Fluids  Requiring 1  vasopressor (w/ or w/o vasopressin) Requiring multiple  vasopressors  (excluding  vasopressin)     And/or  Hypoxia (O2 sats <90% on room air) None Low-flow nasal  cannula or blow-by High-flow nasal  cannula, face mask,  non-rebreather mask,or Venturi mask  Requiring positive  pressure (CPAP,  BiPAP intubation and  mechanical  ventilation)     CRS Summary  Does patient have CRS? No  Current CRS grade: n/a  What therapy was given: n/a  Has CRS grade changed? n/a   Has CRS resolved? n/a       2) Neurotoxicity:    ICE Assessment  Orientation to year, month, city, hospital: 4 points, Name 3 objects (e.g., point to clock, pen, button): 3 point, Following commands: (e.g., Show me 2 fingers): 1 point, Write a standard sentence (e.g., The tang jumped over the log): 1 point and Count backwards from 100 by ten: 1 point  Total points: 10: No impairment    ASTCT ICANS  Consensus Grading for Adults  ICE Score   10    Seizure   No seizures    Motor Findings   No motor findings    Elevated ICP/Cerebral Edema   None          Juvenal A Hayden was admitted to the early phase research unit at the [facility] for administration of therapy and clinical monitoring. The patient has been assessed by a provider prior to admission and medically cleared for therapy. In the case of an adverse event or change in clinical status requiring additional medical management, the PI will be contacted and the decision to admit the patient to the 5C unit will be determined by the BMT team B advanced practice provider and collaborating physician. The attending physician on service is Ihsan Washington MD [id number]        [facility]  [phone]

## 2021-07-06 NOTE — PROGRESS NOTES
Pt discharged to: home  Via: private vehicle  Accompanied by: spouse  Belongings: packed up per pt and taken with him  Teaching: reviewed when to call and when to take tylenol and benadryl  Clinic appointment: 7/8/21 @ [phone]

## 2021-07-06 NOTE — NURSING NOTE
"Oncology Rooming Note    July 6, 2021 7:59 AM   Juvenal Blake is a 55 year old male who presents for:    Chief Complaint   Patient presents with     Blood Draw     Labs drawn via PICC by RN in lab. VS taken.      RECHECK     follicular lymphoma      Initial Vitals: /62   Pulse 77   Temp 97.5  F (36.4  C) (Tympanic)   Resp 16   Wt 104 kg (229 lb 4.8 oz)   SpO2 100%   BMI 34.35 kg/m   Estimated body mass index is 34.35 kg/m  as calculated from the following:    Height as of 6/25/21: 1.74 m (5' 8.5\").    Weight as of this encounter: 104 kg (229 lb 4.8 oz). Body surface area is 2.24 meters squared.  Moderate Pain (4) Comment: Data Unavailable   No LMP for male patient.  Allergies reviewed: Yes  Medications reviewed: Yes    Medications: Medication refills not needed today.  Pharmacy name entered into [employer]: [id number] - [place], [place] - [address] AT [facility]    Clinical concerns: none       Suri Crabtree CMA              "

## 2021-07-06 NOTE — NURSING NOTE
Chief Complaint   Patient presents with     Blood Draw     Labs drawn via PICC by RN in lab. VS taken.      Labs drawn via PICC. Line flushed and Heparin locked. Vital signs taken. Checked into next appointment.       Gloria Holbrook RN

## 2021-07-06 NOTE — PROGRESS NOTES
Type of transplant: Donor: Allogeneic - Unrelated  Product:      BMT INFUSION DOCUMENTATION (last 48 hours)      BMT/Cellular Product Infusion     Row Name 07/06/21 0900                [REMOVED] Product 07/06/21 0912 Other (specify in comment)    Product Details Product Release Date: 07/06/21 -LL Product Release Time: 0912 -LL Product Type: Other (specify in comment)  -LL DIN: [id number]  -LL Product Description Code: [id number]  -LL Volume Dispensed (mL): 28 mL  -LL Completion Date (RN to complete): 07/06/21  -JF Completion Time (RN to complete): 1135  -JF, start 1122     Checked by (Patient RN)  Amanda PHILLIPS       Checked by (Witness)  Emilia Castillo RN  -MARK       Product Volume Infused (mL)  28 mL  -JF       Flush Volume (mL)  50 mL  -JF       Volume Dispensed (mL)  --          [REMOVED] Product 07/06/21 0912 Other (specify in comment)    Product Details Product Release Date: 07/06/21  -LL Product Release Time: 0912 -LL Product Type: Other (specify in comment)  -LL DIN: [id number]  -LL Product Description Code: [id number]  -LL Volume Dispensed (mL): 28 mL  -LL Completion Date (RN to complete): 07/06/21  -JF Completion Time (RN to complete): 1155  -JF, start 1139     Checked by (Patient RN)  Amanda PHILLIPS       Checked by (Witness)  Emilia Castillo RN   -MARK       Product Volume Infused (mL)  28 mL  -JF       Flush Volume (mL)  50 mL  -JF       Volume Dispensed (mL)  --          [REMOVED] Product 07/06/21 0912 Other (specify in comment)    Product Details Product Release Date: 07/06/21  -LL Product Release Time: 0912 -LL Product Type: Other (specify in comment)  -LL DIN: [id number]  -LL Product Description Code: [id number]  -LL Volume Dispensed (mL): 28 mL  -LL Completion Date (RN to complete): 07/06/21  -JF Completion Time (RN to complete): 1212  -JF, start 1158     Checked by (Patient RN)  Amanda PHILLIPS       Checked by (Witness)  VALERIE Puente       Product Volume  Infused (mL)  28 mL  -JF       Flush Volume (mL)  100 mL  -JF       Volume Dispensed (mL)  --         User Key  (r) = Recorded By, (t) = Taken By, (c) = Cosigned By    Initials Name Effective Dates    CS Emilia Castillo RN 02/20/19 -     Amanda Boswell RN 04/30/15 -     Jeannie Longoria 01/12/21 -         Preparation: RN Documentation- pre-med with tylenol and benadrl  Patient was premedicated as ordered: yes  Line Type: central line, right(PICC)  Patient Stable Prior to Infusion: yes  Time Infusion Started: 1122  Teaching: side effects/monitoring- educated on potential side effects and what to expect  Tolerated/Reaction: Patient tolerance of product infusion- tolerated well  Immediate suspected transfusion reaction to the product: none  Did patient have prior history of similar signs/symptoms during this hospitalization?: NA  Symptoms during/after infusion: other (comment)(n/a)  Did the patient tolerate the infusion well: yes  Medications and treatment for symptoms: n/a  Did the symptoms resolve?: (n/a)  Enter comments if clots, leaks, broken bag, infusion delays, other issues with bag/infusion: n/a  Flush until: 1300  Plan: pt will discharge after ICANs and IL-2 shot between [phone]

## 2021-07-06 NOTE — LETTER
7/6/2021         RE: Juvenal Blake  [address] [place] [address]        Dear Colleague,    Thank you for referring your patient, Juvenal Blake, to the [facility] BLOOD AND MARROW TRANSPLANT PROGRAM [place]. Please see a copy of my visit note below.    BMT Daily Progress  Note      Juvenal Blake is a 55 year old male PMH significant for refractory NHL, undergoing  NK infusion. Admitted with rituxan (biosimilar) infusion reaction 6/24 and remained admitted through completion of lymphodepleting chemo and  NK cell infusion.  Currently day +8     HPI: Feels stable overall.  Intake low due to early satiety.  No chest pain, SOB; stable, chronic cough.  Abdomen tender, especially painful in upper quadrants when going over bumps in the car.  Edema improved with compression socks.  No dysuria/hematuria, rash, bleeding, mouth sores, diarrhea/constipation, fevers, cold symptoms.     ROS:  8 point ROS negative except as above.      Physical Exam:   /62   Pulse 77   Temp 97.5  F (36.4  C) (Tympanic)   Resp 16   Wt 104 kg (229 lb 4.8 oz)   SpO2 100%   BMI 34.35 kg/m      Wt Readings from Last 5 Encounters:   07/06/21 104 kg (229 lb 4.8 oz)   07/03/21 105.7 kg (233 lb)   07/01/21 107.3 kg (236 lb 9.6 oz)   06/30/21 108.3 kg (238 lb 11.2 oz)   06/29/21 110.8 kg (244 lb 4.8 oz)     General: No acute distress. ECOG 1. Pale.   Eyes: PERRL  Lungs: CTA bilaterally, no increased WOB.   Cardiovascular: RRR, quiet systolic murmur  Abdominal/Rectal: distended; no discrete palpable masses; tender across both upper quadrants.  Lymphatics: 1+ pedal edema; wearing compression socks.  Fullness over left inguinal region, but no discrete mass.   Skin: no rash; pale  Neuro: A&O/ CN intact (II-XII)  Additional Findings: PICC with bruise at insertion; portacath c/d/i     Labs:  Lab Results   Component Value Date    WBC 1.4 (L) 07/06/2021    ANEU 0.1 (LL) 07/03/2021    HGB 7.8 (L) 07/06/2021    HCT  23.9 (L) 07/06/2021    PLT 82 (L) 07/06/2021     07/06/2021    POTASSIUM 4.1 07/06/2021    CHLORIDE 104 07/06/2021    CO2 29 07/06/2021     (H) 07/06/2021    BUN 11 07/06/2021    CR 1.05 07/06/2021    MAG 2.2 07/06/2021    INR 1.07 06/28/2021    BILITOTAL 0.8 07/06/2021    AST 31 07/06/2021    ALT 49 07/06/2021    ALKPHOS 189 (H) 07/06/2021    PROTTOTAL 6.0 (L) 07/06/2021    ALBUMIN 2.8 (L) 07/06/2021       I have assessed all abnormal lab values for their clinical significance and any values considered clinically significant have been addressed in the assessment and plan.         Assessment and Plan:   Juvenal Blake is a 55 year old male PMH significant for refractory NHL, undergoing  NK infusion. Admitted with rituxan (biosimilar) infusion reaction 6/24 and remained admitted through completion of lymphodepleting chemo and  NK cell infusion.  Currently day +8        1.  Follicular Lymphoma:  - See previous notes regarding prior therapy. Most recently received Haplo NAM-NK therapy 9/2020 and idelalisib 10/2020 through 6/2021.  - Now on  therapy.   - s/p Flu/Cy/rituxan LD chemo (Rituxan infusion abbreviated d/t severe reaction.)  -  infusion 6/28. Currently day +8  - Repeat cell infusion on 5C phase 1 unit on 7/6/2021  - Remains on allopurinol.     Rituxan (biosimilar) reaction 6/24: Previously tolerated rituxan many times. Received 2 doses epinephrine, albuterol, and methylpred. Required up to 6L of oxygen.       2.  HEME: Keep hgb >7g/dL, plt<10,000  - pancytopenic secondary to chemo/disease.   - plts recovering post chemo     3.  :   Initial radical prostatectomy and bilateral lymph node dissection. November 15, 2017. Prostatic adenocarcinoma [place] 4+3 = 7 with tertiary pattern 5. Tumor involves 45% of total surface area. Positive for extensive extraprostatic extension. Positive for bilateral seminal vesicle invasion. Multiple margins positive. Lymphovascular invasion not  "identified. Perineural invasion was noted. Lymph nodes negative. 3 were examined (2 right obturator and 1 left obturator).    Completed radiation to the prostate fossa and pelvic lymph nodes at [facility] early May 2018. He received 4500 cGy in 25 fractions. He then had 2340 cGy boost in 13 fractions to the prostatic fossa, for a total dose of 6240 cGy in 38 treatment fractions delivered over 54 days. He did not receive hormonal therapy:       4.  ID:   - prophy Lev/ACV/fluc.    - C diff colitis: finished vancomycin 6/23, treated for 7 days. Ppx bid vanco started 6/30 (neutropenic; on levaquin).      5.  FEN/Renal:   - Cr stable     6. GI:  Fullness to left groin: no palpable mass but general fullness. Per pt hx appears to fluctuate. Possible inguinal hernia vs. Lymphoma; last CT does mention inguinal LAD. If persists or becomes larger could pursue ultrasound for further imaging.   Abdominal Pain secondary to cancer: oxycodone prn.    Transaminitis: suspect related to cancer. Improving.      7. CV: Cardiology consulted for SOB, elevated troponin during hospital admission 6/24. Per their note: \"Likely Type II MI in setting of demand ischemia from tachycardia, acute on chronic anemia, acute on chronic illness. No evidence of heart failure at this time.\"   - Fasting lipid panel 6/27 with total cholesterol 157, high triglycerides at 362   - Recommend checking lipid profile and outpatient follow up for non-obstructive CAD workup when he is more stable. Cardiology follow up in 1 month (due end of July).        To 5C Phase 1 unit.    Penny Garcia PA-C  7/6/2021          Again, thank you for allowing me to participate in the care of your patient.        Sincerely,        BMT Advanced Practice Provider    "

## 2021-07-06 NOTE — PROGRESS NOTES
5C Phase 1 patient     Here for study drug: FATE 5116    Administered: IV NK cells; subcutaneous IL-2    Type of line used: R PICC line    Location of injection: Left abdomen @ 1417    Premeds given: tylenol and benadryl  Premeds given at: 1023    Post meds: tylenol and benadryl  Post meds given at: 1423    Fluids: 250ml NS pre flush and 250 NS post flush      EKGs: n/a    Labs: research labs drawn pre and post infusion.     Tolerated/side effects: tolerated well  Interventions for side effects: no side effects    Meet discharge criteria: ICANs and IL-2 completed  Discharged at: 1425    _________________________________________________    Status during observation/monitoring hours:    VSS on room air. Pt resting in bed. Tolerated NK cells well. IL-2 injection into left abdomen.    Pt instructed to take post IL-2 tylenol and benadryl at 1830.

## 2021-07-06 NOTE — PROGRESS NOTES
BMT Daily Progress  Note      Juvenal Blake is a 55 year old male PMH significant for refractory NHL, undergoing  NK infusion. Admitted with rituxan (biosimilar) infusion reaction 6/24 and remained admitted through completion of lymphodepleting chemo and  NK cell infusion.  Currently day +8     HPI: Feels stable overall.  Intake low due to early satiety.  No chest pain, SOB; stable, chronic cough.  Abdomen tender, especially painful in upper quadrants when going over bumps in the car.  Edema improved with compression socks.  No dysuria/hematuria, rash, bleeding, mouth sores, diarrhea/constipation, fevers, cold symptoms.     ROS:  8 point ROS negative except as above.      Physical Exam:   /62   Pulse 77   Temp 97.5  F (36.4  C) (Tympanic)   Resp 16   Wt 104 kg (229 lb 4.8 oz)   SpO2 100%   BMI 34.35 kg/m      Wt Readings from Last 5 Encounters:   07/06/21 104 kg (229 lb 4.8 oz)   07/03/21 105.7 kg (233 lb)   07/01/21 107.3 kg (236 lb 9.6 oz)   06/30/21 108.3 kg (238 lb 11.2 oz)   06/29/21 110.8 kg (244 lb 4.8 oz)     General: No acute distress. ECOG 1. Pale.   Eyes: PERRL  Lungs: CTA bilaterally, no increased WOB.   Cardiovascular: RRR, quiet systolic murmur  Abdominal/Rectal: distended; no discrete palpable masses; tender across both upper quadrants.  Lymphatics: 1+ pedal edema; wearing compression socks.  Fullness over left inguinal region, but no discrete mass.   Skin: no rash; pale  Neuro: A&O/ CN intact (II-XII)  Additional Findings: PICC with bruise at insertion; portacath c/d/i     Labs:  Lab Results   Component Value Date    WBC 1.4 (L) 07/06/2021    ANEU 0.1 (LL) 07/03/2021    HGB 7.8 (L) 07/06/2021    HCT 23.9 (L) 07/06/2021    PLT 82 (L) 07/06/2021     07/06/2021    POTASSIUM 4.1 07/06/2021    CHLORIDE 104 07/06/2021    CO2 29 07/06/2021     (H) 07/06/2021    BUN 11 07/06/2021    CR 1.05 07/06/2021    MAG 2.2 07/06/2021    INR 1.07 06/28/2021    BILITOTAL 0.8  07/06/2021    AST 31 07/06/2021    ALT 49 07/06/2021    ALKPHOS 189 (H) 07/06/2021    PROTTOTAL 6.0 (L) 07/06/2021    ALBUMIN 2.8 (L) 07/06/2021       I have assessed all abnormal lab values for their clinical significance and any values considered clinically significant have been addressed in the assessment and plan.         Assessment and Plan:   Juvenal Blake is a 55 year old male PMH significant for refractory NHL, undergoing  NK infusion. Admitted with rituxan (biosimilar) infusion reaction 6/24 and remained admitted through completion of lymphodepleting chemo and  NK cell infusion.  Currently day +8        1.  Follicular Lymphoma:  - See previous notes regarding prior therapy. Most recently received Haplo NAM-NK therapy 9/2020 and idelalisib 10/2020 through 6/2021.  - Now on  therapy.   - s/p Flu/Cy/rituxan LD chemo (Rituxan infusion abbreviated d/t severe reaction.)  -  infusion 6/28. Currently day +8  - Repeat cell infusion on 5C phase 1 unit on 7/6/2021  - Remains on allopurinol.     Rituxan (biosimilar) reaction 6/24: Previously tolerated rituxan many times. Received 2 doses epinephrine, albuterol, and methylpred. Required up to 6L of oxygen.       2.  HEME: Keep hgb >7g/dL, plt<10,000  - pancytopenic secondary to chemo/disease.   - plts recovering post chemo     3.  :   Initial radical prostatectomy and bilateral lymph node dissection. November 15, 2017. Prostatic adenocarcinoma [place] 4+3 = 7 with tertiary pattern 5. Tumor involves 45% of total surface area. Positive for extensive extraprostatic extension. Positive for bilateral seminal vesicle invasion. Multiple margins positive. Lymphovascular invasion not identified. Perineural invasion was noted. Lymph nodes negative. 3 were examined (2 right obturator and 1 left obturator).    Completed radiation to the prostate fossa and pelvic lymph nodes at [facility] early May 2018. He received 4500 cGy in 25 fractions. He then  "had 2340 cGy boost in 13 fractions to the prostatic fossa, for a total dose of 6240 cGy in 38 treatment fractions delivered over 54 days. He did not receive hormonal therapy:       4.  ID:   - prophy Lev/ACV/fluc.    - C diff colitis: finished vancomycin 6/23, treated for 7 days. Ppx bid vanco started 6/30 (neutropenic; on levaquin).      5.  FEN/Renal:   - Cr stable     6. GI:  Fullness to left groin: no palpable mass but general fullness. Per pt hx appears to fluctuate. Possible inguinal hernia vs. Lymphoma; last CT does mention inguinal LAD. If persists or becomes larger could pursue ultrasound for further imaging.   Abdominal Pain secondary to cancer: oxycodone prn.    Transaminitis: suspect related to cancer. Improving.      7. CV: Cardiology consulted for SOB, elevated troponin during hospital admission 6/24. Per their note: \"Likely Type II MI in setting of demand ischemia from tachycardia, acute on chronic anemia, acute on chronic illness. No evidence of heart failure at this time.\"   - Fasting lipid panel 6/27 with total cholesterol 157, high triglycerides at 362   - Recommend checking lipid profile and outpatient follow up for non-obstructive CAD workup when he is more stable. Cardiology follow up in 1 month (due end of July).        To 5C Phase 1 unit.    Penny Garcia PA-C  7/6/2021      "

## 2021-07-07 ENCOUNTER — HOSPITAL ENCOUNTER (OUTPATIENT)
Facility: CLINIC | Age: 56
Setting detail: SPECIMEN
Discharge: HOME | End: 2021-07-07
Admitting: FAMILY MEDICINE
Payer: COMMERCIAL

## 2021-07-07 PROCEDURE — 38208 THAW PRESERVED STEM CELLS: CPT

## 2021-07-07 NOTE — PROGRESS NOTES
BMT Daily Progress  Note      Juvenal Blake is a 55 year old male PMH significant for refractory NHL, undergoing  NK infusion. Admitted with rituxan (biosimilar) infusion reaction 6/24 and remained admitted through completion of lymphodepleting chemo and  NK cell infusion.  Currently day +11     HPI: Returns for follow up. Feeling really well the last few days. Abdomen feels less distended but  to palpation. Swelling in lower extremities is improved as well. Appetite mildly improved. Still using compazine prn for nausea. No diarrhea. No fevers, chills, or infectious symptoms. No other acute medical complaints.      ROS:  8 point ROS negative except as above.      Physical Exam:   /82   Pulse 73   Temp 98.1  F (36.7  C) (Oral)   Resp 16   Wt 101.6 kg (224 lb)   SpO2 99%   BMI 33.56 kg/m      Wt Readings from Last 5 Encounters:   07/08/21 101.6 kg (224 lb)   07/06/21 102.6 kg (226 lb 4.8 oz)   07/06/21 104 kg (229 lb 4.8 oz)   07/03/21 105.7 kg (233 lb)   07/01/21 107.3 kg (236 lb 9.6 oz)     General: No acute distress. ECOG 1. Pale.   Eyes: PERRL  Lungs: CTA bilaterally, no increased WOB.   Cardiovascular: RRR, quiet systolic murmur  Abdominal/Rectal: distended; no discrete palpable masses; tender in epigastric and central lower abdominal region.  Lymphatics: trace edema in lower extremities bilaterally, wearing compression stockings.   Skin: no rash; pale  Neuro: A&O/ CN intact (II-XII)  Additional Findings: PICC with bruise at insertion; portacath c/d/i    ICANS: 10/10- pt hs sentence log with him.       Labs:  Lab Results   Component Value Date    WBC 1.4 (L) 07/06/2021    ANEU 0.1 (LL) 07/06/2021    HGB 7.8 (L) 07/06/2021    HCT 23.9 (L) 07/06/2021    PLT 82 (L) 07/06/2021     07/06/2021    POTASSIUM 4.1 07/06/2021    CHLORIDE 104 07/06/2021    CO2 29 07/06/2021     (H) 07/06/2021    BUN 11 07/06/2021    CR 1.05 07/06/2021    MAG 2.2 07/06/2021    INR 1.07  06/28/2021    BILITOTAL 0.8 07/06/2021    AST 31 07/06/2021    ALT 49 07/06/2021    ALKPHOS 189 (H) 07/06/2021    PROTTOTAL 6.0 (L) 07/06/2021    ALBUMIN 2.8 (L) 07/06/2021       I have assessed all abnormal lab values for their clinical significance and any values considered clinically significant have been addressed in the assessment and plan.         Assessment and Plan:   Juvenal Blake is a 55 year old male PMH significant for refractory NHL, undergoing  NK infusion. Admitted with rituxan (biosimilar) infusion reaction 6/24 and remained admitted through completion of lymphodepleting chemo and  NK cell infusion.  Currently day +11       1.  Follicular Lymphoma:  - See previous notes regarding prior therapy. Most recently received Haplo NAM-NK therapy 9/2020 and idelalisib 10/2020 through 6/2021.  - Now on  therapy, cycle 1.   - s/p Flu/Cy/rituxan LD chemo (Rituxan infusion abbreviated d/t severe reaction- see below)  -  infusion 6/28 and 7/6. Next cell infusion 7/12.   - Currently day +11  - Remains on allopurinol.     Rituxan (biosimilar) reaction 6/24: Previously tolerated rituxan many times. Received 2 doses epinephrine, albuterol, and methylpred. Required up to 6L of oxygen. Now resolved.      2.  HEME: Keep hgb >7g/dL, plt<10,000  - plts and WBC recovering post chemo but remains pancytopenic secondary to chemo/disease.      3.  :   Initial radical prostatectomy and bilateral lymph node dissection. November 15, 2017. Prostatic adenocarcinoma [place] 4+3 = 7 with tertiary pattern 5. Tumor involves 45% of total surface area. Positive for extensive extraprostatic extension. Positive for bilateral seminal vesicle invasion. Multiple margins positive. Lymphovascular invasion not identified. Perineural invasion was noted. Lymph nodes negative. 3 were examined (2 right obturator and 1 left obturator).    Completed radiation to the prostate fossa and pelvic lymph nodes at [facility]  "early May 2018. He received 4500 cGy in 25 fractions. He then had 2340 cGy boost in 13 fractions to the prostatic fossa, for a total dose of 6240 cGy in 38 treatment fractions delivered over 54 days. He did not receive hormonal therapy:       4.  ID:   - prophy Lev/ACV/fluc. \ so will continue Lev/fluc/vanco BID for now however may be able to discontinue in the upcoming days.     - C diff colitis: finished vancomycin 6/23, treated for 7 days. Ppx bid vanco started 6/30 (neutropenic; on levaquin).      5.  FEN/Renal:   - Cr stable     6. GI:  Fullness to left groin: no palpable mass but general fullness. Per pt hx appears to fluctuate. Possible inguinal hernia vs. Lymphoma; last CT does mention inguinal LAD. If persists or becomes larger could pursue ultrasound for further imaging.   Abdominal Pain secondary to cancer: oxycodone prn.    Transaminitis: suspect related to cancer. Improving.      7. CV: Cardiology consulted for SOB, elevated troponin during hospital admission 6/24. Per their note: \"Likely Type II MI in setting of demand ischemia from tachycardia, acute on chronic anemia, acute on chronic illness. No evidence of heart failure at this time.\"   - Fasting lipid panel 6/27 with total cholesterol 157, high triglycerides at 362   - Recommend checking lipid profile and outpatient follow up for non-obstructive CAD workup when he is more stable. Cardiology follow up in 1 month (due end of July).        RTC: Monday 7/12 for labs and provider visit prior to admission to phase one unit for repeat cell infusion and IL2.     30 minutes spent on the date of the encounter doing chart review, history and exam, documentation and further activities per the note    Topher Haro PA-C   [phone]  "

## 2021-07-08 ENCOUNTER — ONCOLOGY VISIT (OUTPATIENT)
Dept: TRANSPLANT | Facility: CLINIC | Age: 56
End: 2021-07-08
Attending: PHYSICIAN ASSISTANT
Payer: COMMERCIAL

## 2021-07-08 ENCOUNTER — APPOINTMENT (OUTPATIENT)
Dept: LAB | Facility: CLINIC | Age: 56
End: 2021-07-08
Attending: PHYSICIAN ASSISTANT
Payer: COMMERCIAL

## 2021-07-08 VITALS
RESPIRATION RATE: 16 BRPM | TEMPERATURE: 98.1 F | DIASTOLIC BLOOD PRESSURE: 82 MMHG | OXYGEN SATURATION: 99 % | BODY MASS INDEX: 33.56 KG/M2 | SYSTOLIC BLOOD PRESSURE: 132 MMHG | WEIGHT: 224 LBS | HEART RATE: 73 BPM

## 2021-07-08 DIAGNOSIS — C82.08 FOLLICULAR LYMPHOMA GRADE I, LYMPH NODES OF MULTIPLE SITES (H): Primary | ICD-10-CM

## 2021-07-08 DIAGNOSIS — R11.0 NAUSEA: ICD-10-CM

## 2021-07-08 LAB
ALBUMIN SERPL-MCNC: 2.8 G/DL (ref 3.4–5)
ALP SERPL-CCNC: 203 U/L (ref 40–150)
ALT SERPL W P-5'-P-CCNC: 48 U/L (ref 0–70)
ANION GAP SERPL CALCULATED.3IONS-SCNC: 4 MMOL/L (ref 3–14)
AST SERPL W P-5'-P-CCNC: 24 U/L (ref 0–45)
BASOPHILS # BLD AUTO: 0 10E9/L (ref 0–0.2)
BASOPHILS NFR BLD AUTO: 0.8 %
BILIRUB SERPL-MCNC: 0.6 MG/DL (ref 0.2–1.3)
BUN SERPL-MCNC: 11 MG/DL (ref 7–30)
CALCIUM SERPL-MCNC: 8.9 MG/DL (ref 8.5–10.1)
CHLORIDE SERPL-SCNC: 107 MMOL/L (ref 94–109)
CO2 SERPL-SCNC: 28 MMOL/L (ref 20–32)
CREAT SERPL-MCNC: 1.03 MG/DL (ref 0.66–1.25)
DIFFERENTIAL METHOD BLD: ABNORMAL
EOSINOPHIL # BLD AUTO: 0.2 10E9/L (ref 0–0.7)
EOSINOPHIL NFR BLD AUTO: 8.2 %
ERYTHROCYTE [DISTWIDTH] IN BLOOD BY AUTOMATED COUNT: 17.2 % (ref 10–15)
GFR SERPL CREATININE-BSD FRML MDRD: 80 ML/MIN/{1.73_M2}
GLUCOSE SERPL-MCNC: 112 MG/DL (ref 70–99)
HCT VFR BLD AUTO: 24.8 % (ref 40–53)
HGB BLD-MCNC: 7.6 G/DL (ref 13.3–17.7)
IMM GRANULOCYTES # BLD: 0.1 10E9/L (ref 0–0.4)
IMM GRANULOCYTES NFR BLD: 3.9 %
LYMPHOCYTES # BLD AUTO: 1.1 10E9/L (ref 0.8–5.3)
LYMPHOCYTES NFR BLD AUTO: 41.2 %
MCH RBC QN AUTO: 28.4 PG (ref 26.5–33)
MCHC RBC AUTO-ENTMCNC: 30.6 G/DL (ref 31.5–36.5)
MCV RBC AUTO: 93 FL (ref 78–100)
MONOCYTES # BLD AUTO: 0.7 10E9/L (ref 0–1.3)
MONOCYTES NFR BLD AUTO: 25.5 %
NEUTROPHILS # BLD AUTO: 0.5 10E9/L (ref 1.6–8.3)
NEUTROPHILS NFR BLD AUTO: 20.4 %
NRBC # BLD AUTO: 0 10*3/UL
NRBC BLD AUTO-RTO: 0 /100
PLATELET # BLD AUTO: 117 10E9/L (ref 150–450)
PLATELET # BLD EST: ABNORMAL 10*3/UL
POTASSIUM SERPL-SCNC: 4.2 MMOL/L (ref 3.4–5.3)
PROT SERPL-MCNC: 5.9 G/DL (ref 6.8–8.8)
RBC # BLD AUTO: 2.68 10E12/L (ref 4.4–5.9)
RBC MORPH BLD: ABNORMAL
SODIUM SERPL-SCNC: 138 MMOL/L (ref 133–144)
WBC # BLD AUTO: 2.6 10E9/L (ref 4–11)

## 2021-07-08 PROCEDURE — 85025 COMPLETE CBC W/AUTO DIFF WBC: CPT

## 2021-07-08 PROCEDURE — 999N001121 HC STATISTIC RESEARCH KIT COLLECTION

## 2021-07-08 PROCEDURE — G0463 HOSPITAL OUTPT CLINIC VISIT: HCPCS

## 2021-07-08 PROCEDURE — 36592 COLLECT BLOOD FROM PICC: CPT

## 2021-07-08 PROCEDURE — 80053 COMPREHEN METABOLIC PANEL: CPT | Performed by: PHYSICIAN ASSISTANT

## 2021-07-08 PROCEDURE — 250N000011 HC RX IP 250 OP 636: Performed by: PHYSICIAN ASSISTANT

## 2021-07-08 PROCEDURE — 99214 OFFICE O/P EST MOD 30 MIN: CPT

## 2021-07-08 RX ORDER — HEPARIN SODIUM,PORCINE 10 UNIT/ML
5 VIAL (ML) INTRAVENOUS ONCE
Status: COMPLETED | OUTPATIENT
Start: 2021-07-08 | End: 2021-07-08

## 2021-07-08 RX ORDER — PROCHLORPERAZINE MALEATE 5 MG
5 TABLET ORAL EVERY 6 HOURS PRN
Qty: 60 TABLET | Refills: 1 | Status: SHIPPED | OUTPATIENT
Start: 2021-07-08 | End: 2021-08-17

## 2021-07-08 RX ADMIN — Medication 5 ML: at 10:12

## 2021-07-08 RX ADMIN — Medication 5 ML: at 10:13

## 2021-07-08 ASSESSMENT — PAIN SCALES - GENERAL: PAINLEVEL: MILD PAIN (2)

## 2021-07-08 NOTE — LETTER
7/8/2021         RE: Juvenal Blake  [address] [place] [address]        Dear Colleague,    Thank you for referring your patient, Juvenal Blake, to the [facility] [place]. Please see a copy of my visit note below.    BMT Daily Progress  Note      Juvenal Blake is a 55 year old male PMH significant for refractory NHL, undergoing  NK infusion. Admitted with rituxan (biosimilar) infusion reaction 6/24 and remained admitted through completion of lymphodepleting chemo and  NK cell infusion.  Currently day +11     HPI: Returns for follow up. Feeling really well the last few days. Abdomen feels less distended but  to palpation. Swelling in lower extremities is improved as well. Appetite mildly improved. Still using compazine prn for nausea. No diarrhea. No fevers, chills, or infectious symptoms. No other acute medical complaints.      ROS:  8 point ROS negative except as above.      Physical Exam:   /82   Pulse 73   Temp 98.1  F (36.7  C) (Oral)   Resp 16   Wt 101.6 kg (224 lb)   SpO2 99%   BMI 33.56 kg/m      Wt Readings from Last 5 Encounters:   07/08/21 101.6 kg (224 lb)   07/06/21 102.6 kg (226 lb 4.8 oz)   07/06/21 104 kg (229 lb 4.8 oz)   07/03/21 105.7 kg (233 lb)   07/01/21 107.3 kg (236 lb 9.6 oz)     General: No acute distress. ECOG 1. Pale.   Eyes: PERRL  Lungs: CTA bilaterally, no increased WOB.   Cardiovascular: RRR, quiet systolic murmur  Abdominal/Rectal: distended; no discrete palpable masses; tender in epigastric and central lower abdominal region.  Lymphatics: trace edema in lower extremities bilaterally, wearing compression stockings.   Skin: no rash; pale  Neuro: A&O/ CN intact (II-XII)  Additional Findings: PICC with bruise at insertion; portacath c/d/i    ICANS: 10/10- pt hs sentence log with him.       Labs:  Lab Results   Component Value Date    WBC 1.4 (L) 07/06/2021    ANEU 0.1 (LL) 07/06/2021    HGB  7.8 (L) 07/06/2021    HCT 23.9 (L) 07/06/2021    PLT 82 (L) 07/06/2021     07/06/2021    POTASSIUM 4.1 07/06/2021    CHLORIDE 104 07/06/2021    CO2 29 07/06/2021     (H) 07/06/2021    BUN 11 07/06/2021    CR 1.05 07/06/2021    MAG 2.2 07/06/2021    INR 1.07 06/28/2021    BILITOTAL 0.8 07/06/2021    AST 31 07/06/2021    ALT 49 07/06/2021    ALKPHOS 189 (H) 07/06/2021    PROTTOTAL 6.0 (L) 07/06/2021    ALBUMIN 2.8 (L) 07/06/2021       I have assessed all abnormal lab values for their clinical significance and any values considered clinically significant have been addressed in the assessment and plan.         Assessment and Plan:   Juvenal Blake is a 55 year old male PMH significant for refractory NHL, undergoing  NK infusion. Admitted with rituxan (biosimilar) infusion reaction 6/24 and remained admitted through completion of lymphodepleting chemo and  NK cell infusion.  Currently day +11       1.  Follicular Lymphoma:  - See previous notes regarding prior therapy. Most recently received Haplo NAM-NK therapy 9/2020 and idelalisib 10/2020 through 6/2021.  - Now on  therapy, cycle 1.   - s/p Flu/Cy/rituxan LD chemo (Rituxan infusion abbreviated d/t severe reaction- see below)  -  infusion 6/28 and 7/6. Next cell infusion 7/12.   - Currently day +11  - Remains on allopurinol.     Rituxan (biosimilar) reaction 6/24: Previously tolerated rituxan many times. Received 2 doses epinephrine, albuterol, and methylpred. Required up to 6L of oxygen. Now resolved.      2.  HEME: Keep hgb >7g/dL, plt<10,000  - plts and WBC recovering post chemo but remains pancytopenic secondary to chemo/disease.      3.  :   Initial radical prostatectomy and bilateral lymph node dissection. November 15, 2017. Prostatic adenocarcinoma [place] 4+3 = 7 with tertiary pattern 5. Tumor involves 45% of total surface area. Positive for extensive extraprostatic extension. Positive for bilateral seminal vesicle invasion.  "Multiple margins positive. Lymphovascular invasion not identified. Perineural invasion was noted. Lymph nodes negative. 3 were examined (2 right obturator and 1 left obturator).    Completed radiation to the prostate fossa and pelvic lymph nodes at [facility] early May 2018. He received 4500 cGy in 25 fractions. He then had 2340 cGy boost in 13 fractions to the prostatic fossa, for a total dose of 6240 cGy in 38 treatment fractions delivered over 54 days. He did not receive hormonal therapy:       4.  ID:   - prophy Lev/ACV/fluc. \ so will continue Lev/fluc/vanco BID for now however may be able to discontinue in the upcoming days.     - C diff colitis: finished vancomycin 6/23, treated for 7 days. Ppx bid vanco started 6/30 (neutropenic; on levaquin).      5.  FEN/Renal:   - Cr stable     6. GI:  Fullness to left groin: no palpable mass but general fullness. Per pt hx appears to fluctuate. Possible inguinal hernia vs. Lymphoma; last CT does mention inguinal LAD. If persists or becomes larger could pursue ultrasound for further imaging.   Abdominal Pain secondary to cancer: oxycodone prn.    Transaminitis: suspect related to cancer. Improving.      7. CV: Cardiology consulted for SOB, elevated troponin during hospital admission 6/24. Per their note: \"Likely Type II MI in setting of demand ischemia from tachycardia, acute on chronic anemia, acute on chronic illness. No evidence of heart failure at this time.\"   - Fasting lipid panel 6/27 with total cholesterol 157, high triglycerides at 362   - Recommend checking lipid profile and outpatient follow up for non-obstructive CAD workup when he is more stable. Cardiology follow up in 1 month (due end of July).        RTC: Monday 7/12 for labs and provider visit prior to admission to phase one unit for repeat cell infusion and IL2.     30 minutes spent on the date of the encounter doing chart review, history and exam, documentation and further activities per " the note    Topher Haro PA-C   [phone]      Again, thank you for allowing me to participate in the care of your patient.        Sincerely,        BMT Advanced Practice Provider

## 2021-07-08 NOTE — NURSING NOTE
Chief Complaint   Patient presents with     Blood Draw     Labs drawn via PICC by RN. VS taken.     Labs drawn from PICC by rn. Caps changed. Good blood return noted in both lumens.  Both lumens flushed with NS and heparin.  Pt tolerated well.  VS taken.  Pt checked in for next appt.    Vern Trotter RN

## 2021-07-08 NOTE — NURSING NOTE
Pt had dressing changed using Sterile technique. Site looks clean, dry and intact. New dressing was applied. See flow sheets for details.     Bárbara Krueger LPN July 8, 2021 12:37 PM

## 2021-07-08 NOTE — NURSING NOTE
"Oncology Rooming Note    July 8, 2021 10:35 AM   Juvenal Blake is a 55 year old male who presents for:    Chief Complaint   Patient presents with     Blood Draw     Labs drawn via PICC by RN. VS taken.     Oncology Clinic Visit     Follicular lymphoma grade i, lymph nodes of multiple sites (H) (Primary Dx)      Initial Vitals: /82   Pulse 73   Temp 98.1  F (36.7  C) (Oral)   Resp 16   Wt 101.6 kg (224 lb)   SpO2 99%   BMI 33.56 kg/m   Estimated body mass index is 33.56 kg/m  as calculated from the following:    Height as of 6/25/21: 1.74 m (5' 8.5\").    Weight as of this encounter: 101.6 kg (224 lb). Body surface area is 2.22 meters squared.  Mild Pain (2) Comment: Data Unavailable   No LMP for male patient.  Allergies reviewed: Yes  Medications reviewed: Yes    Medications: MEDICATION REFILLS NEEDED TODAY. Provider was notified. refill needed on Compazine.  Pharmacy name entered into [facility]: [id number] - [place], [place] - [address] AT [facility]    Clinical concerns: none.       Rox Ward MA            "

## 2021-07-09 ENCOUNTER — RESEARCH ENCOUNTER (OUTPATIENT)
Dept: TRANSPLANT | Facility: CLINIC | Age: 56
End: 2021-07-09

## 2021-07-09 DIAGNOSIS — C82.08 FOLLICULAR LYMPHOMA GRADE I, LYMPH NODES OF MULTIPLE SITES (H): Primary | ICD-10-CM

## 2021-07-09 NOTE — PROGRESS NOTES
[id number] : Study Visit Note   Subject name: Juvenal Blake     Visit: Cycle 1 Day 11    Did the study visit occur within the appropriate window allowed by the protocol? yes      Since the last study visit, He has been doing well. Erythema at IL-2 injection site is resolved. Denies any other concerns. Here today for  infusion #2.    Patient tolerated infusion well without incident.     I have personally interviewed Juvenal Blake and reviewed his medical record for adverse events and concomitant medications and these have been recorded on the corresponding logs in Juvenal MCGRAW Gabrielkenneyrachele's research file.     Juvenal Blake was given the opportunity to ask any trial related questions.  Please see provider progress note for physical exam and other clinical information. Labs were reviewed - any significant lab values were addressed and reviewed.    Emilia Castillo RN

## 2021-07-09 NOTE — PROGRESS NOTES
Called patient to confirm clinic appointment time for 7/12 and phase bed admission for  infusion #3. Covid test has been ordered. Notified Juvenal that someone from central scheduling will be contacting him to get that completed. He denied any questions about the plan.

## 2021-07-11 DIAGNOSIS — C82.08 FOLLICULAR LYMPHOMA GRADE I, LYMPH NODES OF MULTIPLE SITES (H): ICD-10-CM

## 2021-07-11 PROCEDURE — U0005 INFEC AGEN DETEC AMPLI PROBE: HCPCS

## 2021-07-11 PROCEDURE — U0003 INFECTIOUS AGENT DETECTION BY NUCLEIC ACID (DNA OR RNA); SEVERE ACUTE RESPIRATORY SYNDROME CORONAVIRUS 2 (SARS-COV-2) (CORONAVIRUS DISEASE [COVID-19]), AMPLIFIED PROBE TECHNIQUE, MAKING USE OF HIGH THROUGHPUT TECHNOLOGIES AS DESCRIBED BY CMS-2020-01-R: HCPCS

## 2021-07-12 ENCOUNTER — APPOINTMENT (OUTPATIENT)
Dept: LAB | Facility: CLINIC | Age: 56
End: 2021-07-12
Attending: NURSE PRACTITIONER
Payer: COMMERCIAL

## 2021-07-12 ENCOUNTER — RESEARCH ENCOUNTER (OUTPATIENT)
Dept: TRANSPLANT | Facility: CLINIC | Age: 56
End: 2021-07-12

## 2021-07-12 ENCOUNTER — HOSPITAL ENCOUNTER (OUTPATIENT)
Facility: CLINIC | Age: 56
Discharge: HOME | End: 2021-07-12
Payer: COMMERCIAL

## 2021-07-12 ENCOUNTER — ONCOLOGY VISIT (OUTPATIENT)
Dept: TRANSPLANT | Facility: CLINIC | Age: 56
End: 2021-07-12
Attending: NURSE PRACTITIONER
Payer: COMMERCIAL

## 2021-07-12 VITALS
OXYGEN SATURATION: 98 % | RESPIRATION RATE: 16 BRPM | BODY MASS INDEX: 34.01 KG/M2 | SYSTOLIC BLOOD PRESSURE: 114 MMHG | TEMPERATURE: 98.3 F | DIASTOLIC BLOOD PRESSURE: 70 MMHG | HEART RATE: 77 BPM | WEIGHT: 227 LBS

## 2021-07-12 VITALS
TEMPERATURE: 98.1 F | OXYGEN SATURATION: 95 % | WEIGHT: 224.7 LBS | RESPIRATION RATE: 16 BRPM | DIASTOLIC BLOOD PRESSURE: 73 MMHG | BODY MASS INDEX: 34.05 KG/M2 | HEIGHT: 68 IN | HEART RATE: 69 BPM | SYSTOLIC BLOOD PRESSURE: 107 MMHG

## 2021-07-12 DIAGNOSIS — C82.08 FOLLICULAR LYMPHOMA GRADE I, LYMPH NODES OF MULTIPLE SITES (H): Primary | ICD-10-CM

## 2021-07-12 LAB
ALBUMIN SERPL-MCNC: 2.9 G/DL (ref 3.4–5)
ALP SERPL-CCNC: 184 U/L (ref 40–150)
ALT SERPL W P-5'-P-CCNC: 39 U/L (ref 0–70)
ANION GAP SERPL CALCULATED.3IONS-SCNC: 7 MMOL/L (ref 3–14)
AST SERPL W P-5'-P-CCNC: 30 U/L (ref 0–45)
BASOPHILS # BLD AUTO: 0 10E3/UL (ref 0–0.2)
BASOPHILS NFR BLD AUTO: 1 %
BILIRUB DIRECT SERPL-MCNC: 0.2 MG/DL (ref 0–0.2)
BILIRUB SERPL-MCNC: 0.4 MG/DL (ref 0.2–1.3)
BUN SERPL-MCNC: 10 MG/DL (ref 7–30)
CALCIUM SERPL-MCNC: 8.6 MG/DL (ref 8.5–10.1)
CHLORIDE BLD-SCNC: 109 MMOL/L (ref 94–109)
CO2 SERPL-SCNC: 26 MMOL/L (ref 20–32)
CREAT SERPL-MCNC: 1 MG/DL (ref 0.66–1.25)
EOSINOPHIL # BLD AUTO: 0.3 10E3/UL (ref 0–0.7)
EOSINOPHIL NFR BLD AUTO: 7 %
ERYTHROCYTE [DISTWIDTH] IN BLOOD BY AUTOMATED COUNT: 18.6 % (ref 10–15)
GFR SERPL CREATININE-BSD FRML MDRD: 83 ML/MIN/1.73M2
GLUCOSE BLD-MCNC: 140 MG/DL (ref 70–99)
HCT VFR BLD AUTO: 26.9 % (ref 40–53)
HGB BLD-MCNC: 8.2 G/DL (ref 13.3–17.7)
IMM GRANULOCYTES # BLD: 0.1 10E3/UL
IMM GRANULOCYTES NFR BLD: 3 %
LDH SERPL L TO P-CCNC: 166 U/L (ref 85–227)
LYMPHOCYTES # BLD AUTO: 1.4 10E3/UL (ref 0.8–5.3)
LYMPHOCYTES NFR BLD AUTO: 32 %
MAGNESIUM SERPL-MCNC: 2 MG/DL (ref 1.6–2.3)
MCH RBC QN AUTO: 29 PG (ref 26.5–33)
MCHC RBC AUTO-ENTMCNC: 30.5 G/DL (ref 31.5–36.5)
MCV RBC AUTO: 95 FL (ref 78–100)
MONOCYTES # BLD AUTO: 0.6 10E3/UL (ref 0–1.3)
MONOCYTES NFR BLD AUTO: 14 %
NEUTROPHILS # BLD AUTO: 1.8 10E3/UL (ref 1.6–8.3)
NEUTROPHILS NFR BLD AUTO: 43 %
NRBC # BLD AUTO: 0 10E3/UL
NRBC BLD AUTO-RTO: 0 /100
PHOSPHATE SERPL-MCNC: 3 MG/DL (ref 2.5–4.5)
PLATELET # BLD AUTO: 168 10E3/UL (ref 150–450)
POTASSIUM BLD-SCNC: 4 MMOL/L (ref 3.4–5.3)
PROT SERPL-MCNC: 5.8 G/DL (ref 6.8–8.8)
RBC # BLD AUTO: 2.83 10E6/UL (ref 4.4–5.9)
SARS-COV-2 RNA RESP QL NAA+PROBE: NEGATIVE
SODIUM SERPL-SCNC: 142 MMOL/L (ref 133–144)
WBC # BLD AUTO: 4.2 10E3/UL (ref 4–11)

## 2021-07-12 PROCEDURE — G0463 HOSPITAL OUTPT CLINIC VISIT: HCPCS | Mod: 25

## 2021-07-12 PROCEDURE — 80053 COMPREHEN METABOLIC PANEL: CPT

## 2021-07-12 PROCEDURE — 85004 AUTOMATED DIFF WBC COUNT: CPT

## 2021-07-12 PROCEDURE — 99213 OFFICE O/P EST LOW 20 MIN: CPT

## 2021-07-12 PROCEDURE — 96372 THER/PROPH/DIAG INJ SC/IM: CPT

## 2021-07-12 PROCEDURE — 250N000013 HC RX MED GY IP 250 OP 250 PS 637

## 2021-07-12 PROCEDURE — 250N000011 HC RX IP 250 OP 636: Performed by: NURSE PRACTITIONER

## 2021-07-12 PROCEDURE — 84100 ASSAY OF PHOSPHORUS: CPT

## 2021-07-12 PROCEDURE — 83735 ASSAY OF MAGNESIUM: CPT

## 2021-07-12 PROCEDURE — 250N000011 HC RX IP 250 OP 636

## 2021-07-12 PROCEDURE — G0463 HOSPITAL OUTPT CLINIC VISIT: HCPCS

## 2021-07-12 PROCEDURE — 82248 BILIRUBIN DIRECT: CPT

## 2021-07-12 PROCEDURE — 258N000003 HC RX IP 258 OP 636

## 2021-07-12 PROCEDURE — 83615 LACTATE (LD) (LDH) ENZYME: CPT

## 2021-07-12 RX ORDER — EPINEPHRINE 1 MG/ML
0.3 INJECTION, SOLUTION, CONCENTRATE INTRAVENOUS EVERY 5 MIN PRN
Status: DISCONTINUED | OUTPATIENT
Start: 2021-07-12 | End: 2021-07-12 | Stop reason: HOSPADM

## 2021-07-12 RX ORDER — HEPARIN SODIUM,PORCINE 10 UNIT/ML
5 VIAL (ML) INTRAVENOUS
Status: DISCONTINUED | OUTPATIENT
Start: 2021-07-12 | End: 2021-07-12 | Stop reason: HOSPADM

## 2021-07-12 RX ORDER — DIPHENHYDRAMINE HCL 25 MG
25 CAPSULE ORAL ONCE
Status: DISCONTINUED | OUTPATIENT
Start: 2021-07-12 | End: 2021-07-12 | Stop reason: HOSPADM

## 2021-07-12 RX ORDER — ACETAMINOPHEN 325 MG/1
650 TABLET ORAL EVERY 4 HOURS
Status: COMPLETED | OUTPATIENT
Start: 2021-07-12 | End: 2021-07-12

## 2021-07-12 RX ORDER — NALOXONE HYDROCHLORIDE 0.4 MG/ML
0.2 INJECTION, SOLUTION INTRAMUSCULAR; INTRAVENOUS; SUBCUTANEOUS
Status: DISCONTINUED | OUTPATIENT
Start: 2021-07-12 | End: 2021-07-12 | Stop reason: HOSPADM

## 2021-07-12 RX ORDER — MEPERIDINE HYDROCHLORIDE 25 MG/ML
25-50 INJECTION INTRAMUSCULAR; INTRAVENOUS; SUBCUTANEOUS
Status: DISCONTINUED | OUTPATIENT
Start: 2021-07-12 | End: 2021-07-12 | Stop reason: HOSPADM

## 2021-07-12 RX ORDER — ALBUTEROL SULFATE 90 UG/1
1-2 AEROSOL, METERED RESPIRATORY (INHALATION)
Status: DISCONTINUED | OUTPATIENT
Start: 2021-07-12 | End: 2021-07-12 | Stop reason: HOSPADM

## 2021-07-12 RX ORDER — METHYLPREDNISOLONE SODIUM SUCCINATE 125 MG/2ML
125 INJECTION, POWDER, LYOPHILIZED, FOR SOLUTION INTRAMUSCULAR; INTRAVENOUS
Status: DISCONTINUED | OUTPATIENT
Start: 2021-07-12 | End: 2021-07-12 | Stop reason: HOSPADM

## 2021-07-12 RX ORDER — MEPERIDINE HYDROCHLORIDE 25 MG/ML
25 INJECTION INTRAMUSCULAR; INTRAVENOUS; SUBCUTANEOUS EVERY 30 MIN PRN
Status: DISCONTINUED | OUTPATIENT
Start: 2021-07-12 | End: 2021-07-12 | Stop reason: HOSPADM

## 2021-07-12 RX ORDER — ALBUTEROL SULFATE 5 MG/ML
2.5 SOLUTION RESPIRATORY (INHALATION)
Status: DISCONTINUED | OUTPATIENT
Start: 2021-07-12 | End: 2021-07-12 | Stop reason: HOSPADM

## 2021-07-12 RX ORDER — HEPARIN SODIUM,PORCINE 10 UNIT/ML
3 VIAL (ML) INTRAVENOUS
Status: DISCONTINUED | OUTPATIENT
Start: 2021-07-12 | End: 2021-07-12 | Stop reason: HOSPADM

## 2021-07-12 RX ORDER — DIPHENHYDRAMINE HYDROCHLORIDE 50 MG/ML
50 INJECTION INTRAMUSCULAR; INTRAVENOUS
Status: DISCONTINUED | OUTPATIENT
Start: 2021-07-12 | End: 2021-07-12 | Stop reason: HOSPADM

## 2021-07-12 RX ORDER — ACETAMINOPHEN 325 MG/1
650 TABLET ORAL ONCE
Status: DISCONTINUED | OUTPATIENT
Start: 2021-07-12 | End: 2021-07-12 | Stop reason: HOSPADM

## 2021-07-12 RX ORDER — DIPHENHYDRAMINE HCL 25 MG
25 CAPSULE ORAL EVERY 4 HOURS
Status: COMPLETED | OUTPATIENT
Start: 2021-07-12 | End: 2021-07-12

## 2021-07-12 RX ADMIN — SODIUM CHLORIDE 250 ML: 9 INJECTION, SOLUTION INTRAVENOUS at 10:07

## 2021-07-12 RX ADMIN — ACETAMINOPHEN 650 MG: 325 TABLET, FILM COATED ORAL at 10:28

## 2021-07-12 RX ADMIN — Medication 5 ML: at 08:00

## 2021-07-12 RX ADMIN — DIPHENHYDRAMINE HYDROCHLORIDE 25 MG: 25 CAPSULE ORAL at 10:28

## 2021-07-12 RX ADMIN — Medication 5 ML: at 14:30

## 2021-07-12 RX ADMIN — ACETAMINOPHEN 650 MG: 325 TABLET, FILM COATED ORAL at 14:28

## 2021-07-12 RX ADMIN — Medication 5 ML: at 14:31

## 2021-07-12 RX ADMIN — SODIUM CHLORIDE 250 ML: 9 INJECTION, SOLUTION INTRAVENOUS at 12:37

## 2021-07-12 RX ADMIN — DIPHENHYDRAMINE HYDROCHLORIDE 25 MG: 25 CAPSULE ORAL at 14:29

## 2021-07-12 ASSESSMENT — PAIN SCALES - GENERAL: PAINLEVEL: MILD PAIN (2)

## 2021-07-12 ASSESSMENT — MIFFLIN-ST. JEOR: SCORE: 1811.11

## 2021-07-12 NOTE — PROGRESS NOTES
Admission to Early Phase Research Unit    July 12, 2021    Clinical Trial: [id number], FATE [id number]    Principle Investigator: Dr. Zavala [phone]    Research Nurse: Emilia Castillo [phone]    Common Side Effects & Recommended Intervention(s): fevers.    Will perform ICE assesment 2-4hrs post infusion.      Juvenal Blake was admitted to the early phase research unit at the [facility] for administration of therapy and clinical monitoring. The patient has been assessed by a provider prior to admission and medically cleared for therapy. In the case of an adverse event or change in clinical status requiring additional medical management, the PI will be contacted and the decision to admit the patient to the 5C unit will be determined by the BMT team B advanced practice provider and collaborating physician. The attending physician on service is Dr. Washington [phone]    Counts recovering. [id number]. Hx of c.diff. Okay to stop levaquin. He can stop the PO vanco in a few days when he runs out. He can also stop fluconazole.     F/U on Thursday as scheduled with [facility], labs, provider    Rossi Hardy PA-C  [phone]

## 2021-07-12 NOTE — NURSING NOTE
Chief Complaint   Patient presents with     Blood Draw     Labs drawn via PICC by RN in lab.  VS taken.      Jodi Thorne RN

## 2021-07-12 NOTE — PROGRESS NOTES
Type of transplant: Donor: Allogeneic - Unrelated  Product:      BMT INFUSION DOCUMENTATION (last 48 hours)      BMT/Cellular Product Infusion     Row Name 07/12/21 1000                [REMOVED] Product 07/12/21 1001 Other (specify in comment)    Product Details Product Release Date: 07/12/21  -NB Product Release Time: 1001  -NB Product Type: Other (specify in comment)  -NB DIN: [id number]  -NB Product Description Code: [id number]  -NB Volume Dispensed (mL): 28 mL  -NB Completion Date (RN to complete): 07/12/21  -NI Completion Time (RN to complete): 1544  -NI    Checked by (Patient RN)  Milton Hathaway RN  -GT       Checked by (Witness)  Emilia Castillo RN  -MARK       Product Volume Infused (mL)  28 mL  -GT       Flush Volume (mL)  50 mL  -GT       Volume Dispensed (mL)  --          [REMOVED] Product 07/12/21 1001 Other (specify in comment)    Product Details Product Release Date: 07/12/21  -NB Product Release Time: 1001  -NB Product Type: Other (specify in comment)  -NB DIN: [id number]  -NB Product Description Code: [id number]  -NB Volume Dispensed (mL): 28 mL  -NB Completion Date (RN to complete): 07/12/21  -NI Completion Time (RN to complete): 1544  -NI    Checked by (Patient RN)  Milton Hathaway RN  -GT       Checked by (Witness)  Sarah Beth Kessler RN  -DL       Product Volume Infused (mL)  28 mL  -GT       Flush Volume (mL)  50 mL  -GT       Volume Dispensed (mL)  --          [REMOVED] Product 07/12/21 1001 Other (specify in comment)    Product Details Product Release Date: 07/12/21  -NB Product Release Time: 1001  -NB Product Type: Other (specify in comment)  -NB DIN: [id number]  -NB Product Description Code: [id number]  -NB Volume Dispensed (mL): 28 mL  -NB Completion Date (RN to complete): 07/12/21  -NI Completion Time (RN to complete): 1544  -NI    Checked by (Patient RN)  Milton Hathaway RN  -GT       Checked by (Witness)  Emilia Castillo RN   -MARK       Product Volume Infused (mL)  28 mL  -GT        Flush Volume (mL)  50 mL  -GT       Volume Dispensed (mL)  --         User Key  (r) = Recorded By, (t) = Taken By, (c) = Cosigned By    Initials Name Effective Dates    NI Inpatient, Nurse --    Sarah Beth Shipman, VALERIE 07/11/21 -     Emilia Matthews RN 02/20/19 -     Milton Kim RN 04/29/14 -     Dimple Nice 01/12/21 - 07/10/21        Preparation: RN Documentation  Patient was premedicated as ordered: yes  Line Type: central line, right  Patient Stable Prior to Infusion: yes  Time Infusion Started: 1144  Teaching: side effects/monitoring  Tolerated/Reaction:  Well  Flush until:NA  Plan:Vitals every 15 minutes times 4 post infusion, Tylenol and benadryl at 1430 along with IL-2 injection. After PA does the ICANS and patient passes, patient was discharged from the unit, around 1440

## 2021-07-12 NOTE — PROGRESS NOTES
BMT/Cellular Allogeneic Product Infusion       Patient Vitals for the past 24 hrs:   Temp Temp src Pulse Resp BP   07/12/21 0850 97.8  F (36.6  C) Oral 69 18 114/79   07/12/21 1102 97.8  F (36.6  C) Oral 71 16 129/77   07/12/21 1134 98  F (36.7  C) Oral 68 20 104/75         BMT INFUSION DOCUMENTATION (last 48 hours)      BMT/Cellular Product Infusion     Row Name                  Product 07/12/21 1001 Other (specify in comment)    Product Details Product Release Date: 07/12/21  -NB Product Release Time: 1001  -NB Product Type: Other (specify in comment)  -NB DIN: [id number]  -NB Product Description Code: [id number]  -NB Volume Dispensed (mL): 28 mL  -NB    Checked by (Patient RN)  --       Checked by (Witness)  --       Product Volume Infused (mL)  --       Flush Volume (mL)  --       Volume Dispensed (mL)  --          Product 07/12/21 1001 Other (specify in comment)    Product Details Product Release Date: 07/12/21  -NB Product Release Time: 1001  -NB Product Type: Other (specify in comment)  -NB DIN: [id number]  -NB Product Description Code: [id number]  -NB Volume Dispensed (mL): 28 mL  -NB    Checked by (Patient RN)  --       Checked by (Witness)  --       Product Volume Infused (mL)  --       Flush Volume (mL)  --       Volume Dispensed (mL)  --          Product 07/12/21 1001 Other (specify in comment)    Product Details Product Release Date: 07/12/21  -NB Product Release Time: 1001  -NB Product Type: Other (specify in comment)  -NB DIN: [id number]  -NB Product Description Code: [id number]  -NB Volume Dispensed (mL): 28 mL  -NB    Checked by (Patient RN)  --       Checked by (Witness)  --       Product Volume Infused (mL)  --       Flush Volume (mL)  --       Volume Dispensed (mL)  --         User Key  (r) = Recorded By, (t) = Taken By, (c) = Cosigned By    Initials Name Effective Dates    Dimple Nice 01/12/21 - 07/10/21        Allogeneic Donor Eligibility Determination and Summary of Records:  Eligible        Type of Infusion: Allogeneic      Baseline Pre-Infusion Evaluation (to be completed by Provider):   Dyspnea: Grade 0 - none  Hypoxia: Grade 0 - not present  Fever: Grade 0 - afebrile  Chills: Grade 0 - none  Febrile Neutropenia: Grade 0 - not present  Sinus Bradycardia: Grade 0 - none  Hypertension: Grade 0 - none  Hypotension: Grade 0 - none  Chest Pain: Grade 0 - none  Bronchospasm: Grade 0 - none  Pain: Grade 0 - none  Rash: Grade 0 - None  Neurologic Specify: none    If adverse reactions, events or complications occur (fever greater than 2 degrees fahrenheit increase, and severe reactions of the following types: chills, dyspnea, bronchospasm, hyper/hypotension, hypoxia, bradycardia, chest pain, back/flank pain, hypoxia, and any other reaction deemed severe or life threatening; any instance of product bag breakage or unusual product appearance)    Any other events that are >= grade 3, then immediately contact the BMT Attending physician, the Cell Therapy Laboratory Medical Director (pager [phone]) and the [facility] Therapy Laboratory ([phone]).  After midnight, holidays & weekends contact the [facility] on the appropriate campus ([facility] [place]: [phone]; [facility] [place]: [phone]).    Rossi Hardy PA-C

## 2021-07-12 NOTE — PROGRESS NOTES
"BMT Daily CARTOX Note (complete days 0-14 and with any subsequent CRS/neurotoxicity)    Date: 7/12/21    Patient is a 55 year old year old female who received infusion on 7/12/21    Vital Signs: /84 (BP Location: Right arm)   Pulse 106   Temp 98.9  F (37.2  C) (Axillary)   Resp 16   Ht 1.485 m (4' 10.47\")   Wt 69.6 kg (153 lb 6.4 oz)   LMP 05/01/2009   SpO2 99%   BMI 31.55 kg/m      1) CRS Grade (based ONLY on parameters below):     Fever:   </= 100.4 (Grade 0)  > 100.4 (Grade 1-4, depending on hypotension/hypoxia)      Blood pressure:  SBP >/= 90 (Grade 0 or 1, depending on fever)  SBP < 90, responsive to fluids (Grade 2)  SBP < 90, requiring one vasopressor (with or without vasopressin)  (Grade 3)  SBP < 90, requiring multiple vasopressors (excluding vasopressin) (Grade 4)    Oxygen saturation:    >/= 90% on room air (Grade 0 or 1, depending on fever)   < 90% on room air, requires low flow nasal cannula or blow by (Grade 2)   < 90% on room air, requires high flow nasal cannula, face mask, non-rebreather mask, or Venturi mask (Grade 3)   < 90% on room air, requires positive pressure (CPAP, BiPAP, mechanical ventilation) (Grade 4)      CRS Summary  Does patient have CRS? No  Current CRS stgstrstastdstest:st st1st What therapy was given: NA  Has CRS grade changed? n/a   Has CRS resolved? n/a     2) Neurotoxicity (ICANS scoring):    ICE Assessment  Orientation to year, month, city, hospital: 4 points  Name 3 objects (e.g., point to clock, pen, button): 3 points  Following commands: (e.g., Show me 2 fingers): 1 point   Write a standard sentence (e.g., The tang jumped over the log): 1 point   Count backwards from 100 by ten: 1 point  Total points: 10: No impairment     ICE Score   10 (Grade 0)   7-9 (Grade 1)   3-6 (Grade 2)   1-2 (Grade 3)   0 (Grade 4)    Depressed LOC     AMS but spontaneous awakening = Grade 1   AMS but awakens to voice = Grade 2   Awakens only to tactile stimuli = Grade 3   Unarousable or requires " vigorous or repetitive tactile stimuli to arouse.  Stupor or coma. = Grade 4     Seizure   No seizures   Any clinical seizure, focal or generalized, that resolves rapidly or nonconvulsive seizures on EEG that resolve without intervention = Grade 3   Life-threatening, prolonged seizure (> 5 minutes), or repetitive clinical or electrical seizures without return to baseline in between = Grade 4    Motor Findings     No motor findings   Deep focal motor weakness such as hemiparesis or paraparesis = Grade 4    Elevated ICP/Cerebral Edema     None   Focal/local edema on neuroimaging = Grade 3   Diffuse cerebral edema on imaging; decerebrate/decorticate posture; or CN VI palsy; or papilledema; or Cushing's triad = Grade 4      ICANS grade is determined by the most severe event (ICE score, level of consciousness, seizure, motor findings, raised ICP/cerebral edema) not attributable to any other cause; for example, a patient with an ICE score of 3 who has a generalized seizure is classified as grade 3 ICANS.  A patient with an ICE score of 0 may be classified as grade 3 ICANS if awake with global aphasia, but a patient with an ICE score of 0 may be classified as grade 4 ICANS if unarousable.     Depressed level of consciousness should be attributable to no other cause (eg, no sedating medication).   Tremors and myoclonus associated with immune effector cell therapies may be graded according to CTCAE v5.0, but they do not influence ICANS grading.   Intracranial hemorrhage with or without associated edema is not considered a neurotoxicity feature and is excluded from ICANS grading. It may be graded according to CTCAE v5.0.      Neurotoxicity Summary  Does patient have neurotoxicity? No  Current ICE score: 10/10  Current ICANS score (0-10): 0  What therapy was given: NA  Has neurotoxicity grade changed? n/a   Has neurotoxicity resolved? n/a       Rossi Hardy PA-C  [phone]

## 2021-07-12 NOTE — PROGRESS NOTES
BMT Daily Progress  Note      Juvenal Blake is a 55 year old male PMH significant for refractory NHL, undergoing  NK infusion. Admitted with rituxan (biosimilar) infusion reaction 6/24 and remained admitted through completion of lymphodepleting chemo and  NK cell infusion.  Currently day +14     HPI: Returns for follow up. Feeling better each day. No n/v/d/c. Minimal abdominal pain. No new SOB, fevers or cough.      ROS:  8 point ROS negative except as above.      Physical Exam:   /70 (BP Location: Left arm, Patient Position: Sitting, Cuff Size: Adult Regular)   Pulse 77   Temp 98.3  F (36.8  C) (Oral)   Resp 16   Wt 103 kg (227 lb)   SpO2 98%   BMI 34.01 kg/m      Wt Readings from Last 5 Encounters:   07/12/21 103 kg (227 lb)   07/08/21 101.6 kg (224 lb)   07/06/21 102.6 kg (226 lb 4.8 oz)   07/06/21 104 kg (229 lb 4.8 oz)   07/03/21 105.7 kg (233 lb)     General: No acute distress. ECOG 1. Pale.   Eyes: PERRL  Lungs: CTA bilaterally, no increased WOB.   Cardiovascular: RRR, quiet systolic murmur  Abdominal/Rectal: distended   Lymphatics: trace edema in lower extremities bilaterally, wearing compression stockings.   Skin: no rash; pale  Neuro: A&O/ CN intact (II-XII)  Additional Findings: PICC with bruise at insertion; portacath c/d/i    ICANS: 10/10- pt hs sentence log with him.       Labs:  Lab Results   Component Value Date    WBC 4.2 07/12/2021    ANEU 0.5 (L) 07/08/2021    HGB 8.2 (L) 07/12/2021    HCT 26.9 (L) 07/12/2021     07/12/2021     07/08/2021    POTASSIUM 4.2 07/08/2021    CHLORIDE 107 07/08/2021    CO2 28 07/08/2021     (H) 07/08/2021    BUN 11 07/08/2021    CR 1.03 07/08/2021    MAG 2.2 07/06/2021    INR 1.07 06/28/2021    BILITOTAL 0.6 07/08/2021    AST 24 07/08/2021    ALT 48 07/08/2021    ALKPHOS 203 (H) 07/08/2021    PROTTOTAL 5.9 (L) 07/08/2021    ALBUMIN 2.8 (L) 07/08/2021       I have assessed all abnormal lab values for their clinical significance  and any values considered clinically significant have been addressed in the assessment and plan.         Assessment and Plan:   Juvenal Blake is a 55 year old male PMH significant for refractory NHL, undergoing  NK infusion. Admitted with rituxan (biosimilar) infusion reaction 6/24 and remained admitted through completion of lymphodepleting chemo and  NK cell infusion.  Currently day +14       1.  Follicular Lymphoma:  - See previous notes regarding prior therapy. Most recently received Haplo NAM-NK therapy 9/2020 and idelalisib 10/2020 through 6/2021.  - Now on  therapy, cycle 1.   - s/p Flu/Cy/rituxan LD chemo (Rituxan infusion abbreviated d/t severe reaction- see below)  -  infusion 6/28 and 7/6. Next cell infusion 7/12-today.   - Currently day +14  - Remains on allopurinol.     Rituxan (biosimilar) reaction 6/24: Previously tolerated rituxan many times. Received 2 doses epinephrine, albuterol, and methylpred. Required up to 6L of oxygen. Now resolved.      2.  HEME: Keep hgb >7g/dL, plt<10,000  - plts and WBC recovering post chemo but remains pancytopenic secondary to chemo/disease.      3.  :   Initial radical prostatectomy and bilateral lymph node dissection. November 15, 2017. Prostatic adenocarcinoma [place] 4+3 = 7 with tertiary pattern 5. Tumor involves 45% of total surface area. Positive for extensive extraprostatic extension. Positive for bilateral seminal vesicle invasion. Multiple margins positive. Lymphovascular invasion not identified. Perineural invasion was noted. Lymph nodes negative. 3 were examined (2 right obturator and 1 left obturator).    Completed radiation to the prostate fossa and pelvic lymph nodes at [facility] early May 2018. He received 4500 cGy in 25 fractions. He then had 2340 cGy boost in 13 fractions to the prostatic fossa, for a total dose of 6240 cGy in 38 treatment fractions delivered over 54 days. He did not receive hormonal therapy:       4.   "ID:   - prophy Lev/ACV/fluc. ANC 1.8(0.5). May be able to discontinue in the upcoming days if stable neutrophils.     - C diff colitis: finished vancomycin 6/23, treated for 7 days. Ppx bid vanco started 6/30 (neutropenic; on levaquin).      5.  FEN/Renal:   - Cr stable     6. GI:  Fullness to left groin: no palpable mass but general fullness. Per pt hx appears to fluctuate. Possible inguinal hernia vs. Lymphoma; last CT does mention inguinal LAD. If persists or becomes larger could pursue ultrasound for further imaging.   Abdominal Pain secondary to cancer: oxycodone prn.    Transaminitis: suspect related to cancer. Improving.      7. CV: Cardiology consulted for SOB, elevated troponin during hospital admission 6/24. Per their note: \"Likely Type II MI in setting of demand ischemia from tachycardia, acute on chronic anemia, acute on chronic illness. No evidence of heart failure at this time.\"   - Fasting lipid panel 6/27 with total cholesterol 157, high triglycerides at 362   - Recommend checking lipid profile and outpatient follow up for non-obstructive CAD workup when he is more stable. Cardiology follow up in 1 month (due end of July).        RTC: 7/15 for provider, labs and bone marrow biopsy      20 minutes spent on the date of the encounter doing chart review, history and exam, documentation and further activities per the note    Kelly Graham NP  "

## 2021-07-12 NOTE — NURSING NOTE
"Oncology Rooming Note    July 12, 2021 8:23 AM   Juvenal Blake is a 55 year old male who presents for:    Chief Complaint   Patient presents with     Blood Draw     Labs drawn via PICC by RN in lab.  VS taken.      Oncology Clinic Visit     FOLLICULAR LYMPHOMA      Initial Vitals: /70 (BP Location: Left arm, Patient Position: Sitting, Cuff Size: Adult Regular)   Pulse 77   Temp 98.3  F (36.8  C) (Oral)   Resp 16   Wt 103 kg (227 lb)   SpO2 98%   BMI 34.01 kg/m   Estimated body mass index is 34.01 kg/m  as calculated from the following:    Height as of 6/25/21: 1.74 m (5' 8.5\").    Weight as of this encounter: 103 kg (227 lb). Body surface area is 2.23 meters squared.  Mild Pain (2) Comment: Data Unavailable   No LMP for male patient.  Allergies reviewed: Yes  Medications reviewed: Yes    Medications: Medication refills not needed today.  Pharmacy name entered into [facility]: [id number] - [place], [place] - [address] AT [facility]    Clinical concerns: No new concerns today  Kelly  was NOT notified.      Natalia Harp            "

## 2021-07-12 NOTE — PROGRESS NOTES
[id number]: Study Visit Note   Subject name: Juvenal Blake     Visit: Cycle 1 Day 15    Did the study visit occur within the appropriate window allowed by the protocol? Yes      Since the last study visit, He has been doing well. Patient endorses that he has been eating better and has more energy. Here today for his third  infusion per protocol.     I have personally interviewed Juvenal Blake and reviewed his medical record for adverse events and concomitant medications and these have been recorded on the corresponding logs in Juvenal Blake's research file.     Juvenal Blake was given the opportunity to ask any trial related questions.  Please see provider progress note for physical exam and other clinical information. Labs were reviewed - any significant lab values were addressed and reviewed.    Emilia Castillo RN

## 2021-07-12 NOTE — LETTER
7/12/2021         RE: Juvenal Blake  [address] [place] [address]        Dear Colleague,    Thank you for referring your patient, Juvenal Blake, to the [facility]OD [facility]ARROW [facility] [place]. Please see a copy of my visit note below.    BMT Daily Progress  Note      Juvenal Blake is a 55 year old male PMH significant for refractory NHL, undergoing  NK infusion. Admitted with rituxan (biosimilar) infusion reaction 6/24 and remained admitted through completion of lymphodepleting chemo and  NK cell infusion.  Currently day +14     HPI: Returns for follow up. Feeling better each day. No n/v/d/c. Minimal abdominal pain. No new SOB, fevers or cough.      ROS:  8 point ROS negative except as above.      Physical Exam:   /70 (BP Location: Left arm, Patient Position: Sitting, Cuff Size: Adult Regular)   Pulse 77   Temp 98.3  F (36.8  C) (Oral)   Resp 16   Wt 103 kg (227 lb)   SpO2 98%   BMI 34.01 kg/m      Wt Readings from Last 5 Encounters:   07/12/21 103 kg (227 lb)   07/08/21 101.6 kg (224 lb)   07/06/21 102.6 kg (226 lb 4.8 oz)   07/06/21 104 kg (229 lb 4.8 oz)   07/03/21 105.7 kg (233 lb)     General: No acute distress. ECOG 1. Pale.   Eyes: PERRL  Lungs: CTA bilaterally, no increased WOB.   Cardiovascular: RRR, quiet systolic murmur  Abdominal/Rectal: distended   Lymphatics: trace edema in lower extremities bilaterally, wearing compression stockings.   Skin: no rash; pale  Neuro: A&O/ CN intact (II-XII)  Additional Findings: PICC with bruise at insertion; portacath c/d/i    ICANS: 10/10- pt hs sentence log with him.       Labs:  Lab Results   Component Value Date    WBC 4.2 07/12/2021    ANEU 0.5 (L) 07/08/2021    HGB 8.2 (L) 07/12/2021    HCT 26.9 (L) 07/12/2021     07/12/2021     07/08/2021    POTASSIUM 4.2 07/08/2021    CHLORIDE 107 07/08/2021    CO2 28 07/08/2021     (H) 07/08/2021    BUN 11 07/08/2021    CR 1.03 07/08/2021     MAG 2.2 07/06/2021    INR 1.07 06/28/2021    BILITOTAL 0.6 07/08/2021    AST 24 07/08/2021    ALT 48 07/08/2021    ALKPHOS 203 (H) 07/08/2021    PROTTOTAL 5.9 (L) 07/08/2021    ALBUMIN 2.8 (L) 07/08/2021       I have assessed all abnormal lab values for their clinical significance and any values considered clinically significant have been addressed in the assessment and plan.         Assessment and Plan:   Juvenal Blake is a 55 year old male PMH significant for refractory NHL, undergoing  NK infusion. Admitted with rituxan (biosimilar) infusion reaction 6/24 and remained admitted through completion of lymphodepleting chemo and  NK cell infusion.  Currently day +14       1.  Follicular Lymphoma:  - See previous notes regarding prior therapy. Most recently received Haplo NAM-NK therapy 9/2020 and idelalisib 10/2020 through 6/2021.  - Now on  therapy, cycle 1.   - s/p Flu/Cy/rituxan LD chemo (Rituxan infusion abbreviated d/t severe reaction- see below)  -  infusion 6/28 and 7/6. Next cell infusion 7/12-today.   - Currently day +14  - Remains on allopurinol.     Rituxan (biosimilar) reaction 6/24: Previously tolerated rituxan many times. Received 2 doses epinephrine, albuterol, and methylpred. Required up to 6L of oxygen. Now resolved.      2.  HEME: Keep hgb >7g/dL, plt<10,000  - plts and WBC recovering post chemo but remains pancytopenic secondary to chemo/disease.      3.  :   Initial radical prostatectomy and bilateral lymph node dissection. November 15, 2017. Prostatic adenocarcinoma [place] 4+3 = 7 with tertiary pattern 5. Tumor involves 45% of total surface area. Positive for extensive extraprostatic extension. Positive for bilateral seminal vesicle invasion. Multiple margins positive. Lymphovascular invasion not identified. Perineural invasion was noted. Lymph nodes negative. 3 were examined (2 right obturator and 1 left obturator).    Completed radiation to the prostate fossa and pelvic  "lymph nodes at [facility] early May 2018. He received 4500 cGy in 25 fractions. He then had 2340 cGy boost in 13 fractions to the prostatic fossa, for a total dose of 6240 cGy in 38 treatment fractions delivered over 54 days. He did not receive hormonal therapy:       4.  ID:   - prophy Lev/ACV/fluc. ANC 1.8(0.5). May be able to discontinue in the upcoming days if stable neutrophils.     - C diff colitis: finished vancomycin 6/23, treated for 7 days. Ppx bid vanco started 6/30 (neutropenic; on levaquin).      5.  FEN/Renal:   - Cr stable     6. GI:  Fullness to left groin: no palpable mass but general fullness. Per pt hx appears to fluctuate. Possible inguinal hernia vs. Lymphoma; last CT does mention inguinal LAD. If persists or becomes larger could pursue ultrasound for further imaging.   Abdominal Pain secondary to cancer: oxycodone prn.    Transaminitis: suspect related to cancer. Improving.      7. CV: Cardiology consulted for SOB, elevated troponin during hospital admission 6/24. Per their note: \"Likely Type II MI in setting of demand ischemia from tachycardia, acute on chronic anemia, acute on chronic illness. No evidence of heart failure at this time.\"   - Fasting lipid panel 6/27 with total cholesterol 157, high triglycerides at 362   - Recommend checking lipid profile and outpatient follow up for non-obstructive CAD workup when he is more stable. Cardiology follow up in 1 month (due end of July).        RTC: 7/15 for provider, labs and bone marrow biopsy      20 minutes spent on the date of the encounter doing chart review, history and exam, documentation and further activities per the note    Kelly Graham NP      Again, thank you for allowing me to participate in the care of your patient.        Sincerely,        BMT Advanced Practice Provider    "

## 2021-07-13 NOTE — PROGRESS NOTES
BMT Post Infusion Documentation    Data   Patient Vitals for the past 72 hrs:   Temp Temp src Pulse Resp BP   07/12/21 0850 97.8  F (36.6  C) Oral 69 18 114/79   07/12/21 1102 97.8  F (36.6  C) Oral 71 16 129/77   07/12/21 1134 98  F (36.7  C) Oral 68 20 104/75   07/12/21 1155 97.8  F (36.6  C) Axillary 74 16 134/88   07/12/21 1214 97.9  F (36.6  C) Axillary 72 16 127/76   07/12/21 1229 97.8  F (36.6  C) Oral 72 16 121/83   07/12/21 1233 98  F (36.7  C) Oral 71 16 119/69   07/12/21 1246 97.6  F (36.4  C) Oral 66 16 121/78   07/12/21 1308 98.2  F (36.8  C) Oral 68 16 122/79   07/12/21 1322 98.1  F (36.7  C) Oral 76 16 113/68   07/12/21 1336 98.1  F (36.7  C) Oral 69 16 107/73        BMT INFUSION DOCUMENTATION (last 24 hours)      BMT/Cellular Product Infusion     Row Name 07/12/21 1000                Cell Therapy Documentation    Product Release Date  07/12/21  -NB       Recipient Study ID    -NB       Donor  Allogeneic - Unrelated  -NB       Donor MRN/ID  [id number]  -NB       Donor ABO/Rh  -- N/A  -NB       Allogeneic Donor Eligibility Determination and Summary of Records  Eligible  -NB       Type of Infusion  Allogeneic  -NB       Total Volume Dispensed (mL)  84  -NB       Total NC Dose  N/A  -NB       Total CD34 Dose  N/A  -NB       Total CD3 dose  N/A  -NB       Total NC Dose Left in Storage  NONE  -NB       Comments for Product Issues   Investigational Product; 9.00E+08 Total cells  -NB       Donor ABO/Rh  --       Product Types  --       Product Numbers  --       Product Types and Numbers  --       Volume  --       ABO Mismatch  --       ZZTotal NC Dose  --       ZZTotal CD34 Dose  --       ZZTotal NC Dose Left in Storage  --          [REMOVED] Product 07/12/21 1001 Other (specify in comment)    Product Details Product Release Date: 07/12/21  -NB Product Release Time: 1001  -NB Product Type: Other (specify in comment)  -NB DIN: [id number]  -NB Product Description Code: [id number]  -NB Volume  Dispensed (mL): 28 mL  -NB Completion Date (RN to complete): 07/12/21  -NI Completion Time (RN to complete): 1155  -GT    Checked by (Patient RN)  Milton Hathaway RN  -GT       Checked by (Witness)  Emilia Castillo RN  -CS       Product Volume Infused (mL)  28 mL  -GT       Flush Volume (mL)  50 mL  -GT       Volume Dispensed (mL)  --          [REMOVED] Product 07/12/21 1001 Other (specify in comment)    Product Details Product Release Date: 07/12/21  -NB Product Release Time: 1001  -NB Product Type: Other (specify in comment)  -NB DIN: [id number]  -NB Product Description Code: [id number]  -NB Volume Dispensed (mL): 28 mL  -NB Completion Date (RN to complete): 07/12/21  -NI Completion Time (RN to complete): 1214  -GT    Checked by (Patient RN)  Milton Hathaway RN  -GT       Checked by (Witness)  Sarah Beth Kessler RN  -DL       Product Volume Infused (mL)  28 mL  -GT       Flush Volume (mL)  50 mL  -GT       Volume Dispensed (mL)  --          [REMOVED] Product 07/12/21 1001 Other (specify in comment)    Product Details Product Release Date: 07/12/21  -NB Product Release Time: 1001  -NB Product Type: Other (specify in comment)  -NB DIN: [id number]  -NB Product Description Code: [id number]  -NB Volume Dispensed (mL): 28 mL  -NB Completion Date (RN to complete): 07/12/21  -NI Completion Time (RN to complete): 1231  -GT    Checked by (Patient RN)  Milton Hathaway RN  -GT       Checked by (Witness)  Emilia Castillo RN   -MARK       Product Volume Infused (mL)  28 mL  -GT       Flush Volume (mL)  50 mL  -GT       Volume Dispensed (mL)  --          RN Documentation    Patient was premedicated as ordered  yes  -GT       Line Type  central line, right  -GT       Patient Stable Prior to Infusion  yes  -GT       Time Infusion Started  1144  -GT       Checked by (Patient RN)  --       Checked by (RN 2)  --       Broken Bag?  --       Immediate suspected transfusion reaction to the product  --       Time Infusion Stopped  --        Total Flush Volume (mL)  --       Checked by (Witness)  --       Date Infusion Started  --       Date Infusion Stopped  --       Volume Infused (mL)  --       Total Volume Infused (cc)  --          Patient tolerance of product infusion    Immediate suspected transfusion reaction to the product  none  -GT       Did patient have prior history of similar signs/symptoms during this hospitalization?  no  -GT       Symptoms during/after infusion  --       Did the patient tolerate the infusion well  yes  -GT       Medications and treatment for symptoms  --       Did the symptoms resolve?  --       Enter comments if clots, leaks, broken bag, infusion delays, other issues with bag/infusion  --       Describe symptoms  --         User Key  (r) = Recorded By, (t) = Taken By, (c) = Cosigned By    Initials Name Effective Dates    NI Inpatient, Nurse --    Sarah Beth Shipman, VALERIE 07/11/21 -     Emilia Matthews, RN 02/20/19 -     GT Milton Hathaway RN 04/29/14 -     Dimple Nice 01/12/21 - 07/10/21    Dimple Nice 07/11/21 -             Post-Infusion Evaluation:   Infusion Related Reaction: Grade 0 - none  Dyspnea: Grade 0 - none  Hypoxia: Grade 0 - not present  Fever: Grade 0 - afebrile  Chills: Grade 0 - none  Febrile Neutropenia: Grade 0 - not present  Sinus Bradycardia: Grade 0 - none  Hypertension: Grade 0 - none  Hypotension: Grade 0 - none  Chest Pain: Grade 0 - none  Bronchospasm: Grade 0 - none  Pain: Grade 0 - none  Rash: Grade 0 - None  Neurologic Specify: none    If this was a cord blood transplant, was more than one cord blood unit infused? IGNACIA Hardy PA-C

## 2021-07-15 ENCOUNTER — ONCOLOGY VISIT (OUTPATIENT)
Dept: TRANSPLANT | Facility: CLINIC | Age: 56
End: 2021-07-15
Attending: PHYSICIAN ASSISTANT
Payer: COMMERCIAL

## 2021-07-15 ENCOUNTER — APPOINTMENT (OUTPATIENT)
Dept: LAB | Facility: CLINIC | Age: 56
End: 2021-07-15
Attending: PHYSICIAN ASSISTANT
Payer: COMMERCIAL

## 2021-07-15 ENCOUNTER — RESEARCH ENCOUNTER (OUTPATIENT)
Dept: TRANSPLANT | Facility: CLINIC | Age: 56
End: 2021-07-15

## 2021-07-15 VITALS
TEMPERATURE: 97.8 F | OXYGEN SATURATION: 99 % | SYSTOLIC BLOOD PRESSURE: 105 MMHG | HEART RATE: 63 BPM | DIASTOLIC BLOOD PRESSURE: 70 MMHG | RESPIRATION RATE: 16 BRPM

## 2021-07-15 DIAGNOSIS — C82.00 FOLLICULAR LYMPHOMA GRADE I, UNSPECIFIED BODY REGION (H): ICD-10-CM

## 2021-07-15 DIAGNOSIS — C82.08 FOLLICULAR LYMPHOMA GRADE I, LYMPH NODES OF MULTIPLE SITES (H): Primary | ICD-10-CM

## 2021-07-15 LAB
BASOPHILS # BLD AUTO: 0 10E3/UL (ref 0–0.2)
BASOPHILS NFR BLD AUTO: 1 %
EOSINOPHIL # BLD AUTO: 0.2 10E3/UL (ref 0–0.7)
EOSINOPHIL NFR BLD AUTO: 5 %
ERYTHROCYTE [DISTWIDTH] IN BLOOD BY AUTOMATED COUNT: 19.3 % (ref 10–15)
HCT VFR BLD AUTO: 29 % (ref 40–53)
HGB BLD-MCNC: 9 G/DL (ref 13.3–17.7)
IMM GRANULOCYTES # BLD: 0.1 10E3/UL
IMM GRANULOCYTES NFR BLD: 1 %
INTERPRETATION: NORMAL
LYMPHOCYTES # BLD AUTO: 1.4 10E3/UL (ref 0.8–5.3)
LYMPHOCYTES NFR BLD AUTO: 34 %
MCH RBC QN AUTO: 29.7 PG (ref 26.5–33)
MCHC RBC AUTO-ENTMCNC: 31 G/DL (ref 31.5–36.5)
MCV RBC AUTO: 96 FL (ref 78–100)
MDL NUMBER: NORMAL
MONOCYTES # BLD AUTO: 0.5 10E3/UL (ref 0–1.3)
MONOCYTES NFR BLD AUTO: 13 %
NEUTROPHILS # BLD AUTO: 1.9 10E3/UL (ref 1.6–8.3)
NEUTROPHILS NFR BLD AUTO: 46 %
NRBC # BLD AUTO: 0 10E3/UL
NRBC BLD AUTO-RTO: 0 /100
PLATELET # BLD AUTO: 151 10E3/UL (ref 150–450)
RBC # BLD AUTO: 3.03 10E6/UL (ref 4.4–5.9)
WBC # BLD AUTO: 4.2 10E3/UL (ref 4–11)

## 2021-07-15 PROCEDURE — 88271 CYTOGENETICS DNA PROBE: CPT

## 2021-07-15 PROCEDURE — 88237 TISSUE CULTURE BONE MARROW: CPT

## 2021-07-15 PROCEDURE — 88275 CYTOGENETICS 100-300: CPT

## 2021-07-15 PROCEDURE — 38222 DX BONE MARROW BX & ASPIR: CPT | Mod: LT | Performed by: PHYSICIAN ASSISTANT

## 2021-07-15 PROCEDURE — 250N000011 HC RX IP 250 OP 636: Performed by: PHYSICIAN ASSISTANT

## 2021-07-15 PROCEDURE — 88185 FLOWCYTOMETRY/TC ADD-ON: CPT

## 2021-07-15 PROCEDURE — 88161 CYTOPATH SMEAR OTHER SOURCE: CPT | Mod: 26 | Performed by: PATHOLOGY

## 2021-07-15 PROCEDURE — 85097 BONE MARROW INTERPRETATION: CPT | Mod: 26 | Performed by: PATHOLOGY

## 2021-07-15 PROCEDURE — 88188 FLOWCYTOMETRY/READ 9-15: CPT | Mod: 26 | Performed by: PATHOLOGY

## 2021-07-15 PROCEDURE — 88311 DECALCIFY TISSUE: CPT | Mod: 26 | Performed by: PATHOLOGY

## 2021-07-15 PROCEDURE — 38222 DX BONE MARROW BX & ASPIR: CPT | Performed by: PHYSICIAN ASSISTANT

## 2021-07-15 PROCEDURE — 88291 CYTO/MOLECULAR REPORT: CPT | Performed by: MEDICAL GENETICS

## 2021-07-15 PROCEDURE — 85025 COMPLETE CBC W/AUTO DIFF WBC: CPT

## 2021-07-15 PROCEDURE — 88341 IMHCHEM/IMCYTCHM EA ADD ANTB: CPT | Mod: 26 | Performed by: PATHOLOGY

## 2021-07-15 PROCEDURE — 85060 BLOOD SMEAR INTERPRETATION: CPT | Mod: 26 | Performed by: PATHOLOGY

## 2021-07-15 PROCEDURE — G0463 HOSPITAL OUTPT CLINIC VISIT: HCPCS | Mod: 25

## 2021-07-15 PROCEDURE — 88342 IMHCHEM/IMCYTCHM 1ST ANTB: CPT | Mod: TC

## 2021-07-15 PROCEDURE — 88280 CHROMOSOME KARYOTYPE STUDY: CPT

## 2021-07-15 PROCEDURE — 999N000104 HC STATISTIC NO CHARGE

## 2021-07-15 PROCEDURE — 88342 IMHCHEM/IMCYTCHM 1ST ANTB: CPT | Mod: 26 | Performed by: PATHOLOGY

## 2021-07-15 PROCEDURE — 88305 TISSUE EXAM BY PATHOLOGIST: CPT | Mod: 26 | Performed by: PATHOLOGY

## 2021-07-15 PROCEDURE — 88184 FLOWCYTOMETRY/ TC 1 MARKER: CPT

## 2021-07-15 PROCEDURE — 99213 OFFICE O/P EST LOW 20 MIN: CPT | Mod: 25

## 2021-07-15 PROCEDURE — 88311 DECALCIFY TISSUE: CPT | Mod: TC

## 2021-07-15 RX ORDER — HEPARIN SODIUM (PORCINE) LOCK FLUSH IV SOLN 100 UNIT/ML 100 UNIT/ML
5 SOLUTION INTRAVENOUS
Status: CANCELLED | OUTPATIENT
Start: 2021-07-15

## 2021-07-15 RX ORDER — HEPARIN SODIUM,PORCINE 10 UNIT/ML
5 VIAL (ML) INTRAVENOUS
Status: CANCELLED | OUTPATIENT
Start: 2021-07-15

## 2021-07-15 RX ORDER — HEPARIN SODIUM,PORCINE 10 UNIT/ML
5 VIAL (ML) INTRAVENOUS
Status: DISCONTINUED | OUTPATIENT
Start: 2021-07-15 | End: 2021-07-15 | Stop reason: HOSPADM

## 2021-07-15 RX ADMIN — MIDAZOLAM HYDROCHLORIDE 2 MG: 1 INJECTION, SOLUTION INTRAMUSCULAR; INTRAVENOUS at 10:16

## 2021-07-15 RX ADMIN — HEPARIN, PORCINE (PF) 10 UNIT/ML INTRAVENOUS SYRINGE 5 ML: at 11:07

## 2021-07-15 RX ADMIN — HEPARIN, PORCINE (PF) 10 UNIT/ML INTRAVENOUS SYRINGE 5 ML: at 11:06

## 2021-07-15 NOTE — LETTER
7/15/2021         RE: Juvenal Blake  [address] [place] [address]        Dear Colleague,    Thank you for referring your patient, Juvenal Blake, to the [facility] BLOOD AND MARROW TRANSPLANT PROGRAM [place]. Please see a copy of my visit note below.    BMT Daily Progress  Note      Juvenal Blake is a 55 year old male PMH significant for refractory NHL, undergoing  NK infusion. Admitted with rituxan (biosimilar) infusion reaction 6/24 and remained admitted through completion of lymphodepleting chemo and  NK cell infusion.  Currently day +18     HPI: Returns for follow up. No acute medical issues. Abdomen is significantly less distended but still has some discomfort with palpation. No fevers following last IL2 injection and cell infusion. No new acute medical complaints.     ROS:  8 point ROS negative except as above.      Physical Exam:   There were no vitals taken for this visit.- reviewed in flowsheets, WNL    Wt Readings from Last 5 Encounters:   07/12/21 101.9 kg (224 lb 11.2 oz)   07/12/21 103 kg (227 lb)   07/08/21 101.6 kg (224 lb)   07/06/21 102.6 kg (226 lb 4.8 oz)   07/06/21 104 kg (229 lb 4.8 oz)     General: No acute distress. ECOG 1. Pale.   Eyes: PERRL  Lungs: CTA bilaterally, no increased WOB.   Cardiovascular: RRR, quiet systolic murmur  Abdominal/Rectal: mild tenderness to epigastric region (unchanged), still with mild distention.   Lymphatics:no edema, wearing compression stockings.   Skin: no rash; pale  Neuro: A&O, answering questions appropriately  Additional Findings: PICC with bruise at insertion; portacath c/d/i    ICANS: 10/10- pt sentence log is in research folder in the [facility]       Labs:  Lab Results   Component Value Date    WBC 4.2 07/15/2021    ANEU 0.5 (L) 07/08/2021    HGB 9.0 (L) 07/15/2021    HCT 29.0 (L) 07/15/2021     07/15/2021     07/12/2021    POTASSIUM 4.0 07/12/2021    CHLORIDE 109 07/12/2021    CO2 26 07/12/2021     (H)  07/12/2021    BUN 10 07/12/2021    CR 1.00 07/12/2021    MAG 2.0 07/12/2021    INR 1.07 06/28/2021    BILITOTAL 0.4 07/12/2021    AST 30 07/12/2021    ALT 39 07/12/2021    ALKPHOS 184 (H) 07/12/2021    PROTTOTAL 5.8 (L) 07/12/2021    ALBUMIN 2.9 (L) 07/12/2021       I have assessed all abnormal lab values for their clinical significance and any values considered clinically significant have been addressed in the assessment and plan.         Assessment and Plan:   Juvenal Blake is a 55 year old male PMH significant for refractory NHL, undergoing  NK infusion. Admitted with rituxan (biosimilar) infusion reaction 6/24 and remained admitted through completion of lymphodepleting chemo and  NK cell infusion.  Currently day +18       1.  Follicular Lymphoma:  - See previous notes regarding prior therapy. Most recently received Haplo NAM-NK therapy 9/2020 and idelalisib 10/2020 through 6/2021.  - Now on  therapy, cycle 1.   - s/p Flu/Cy/rituxan LD chemo (Rituxan infusion abbreviated d/t severe reaction- see below)  -  infusion 6/28, 7/6, and 7/12.   - Currently day +18. Bone marrow biopsy completed today.   - Remains on allopurinol.     Rituxan (biosimilar) reaction 6/24: Previously tolerated rituxan many times. Received 2 doses epinephrine, albuterol, and methylpred. Required up to 6L of oxygen. Now resolved.      2.  HEME: Keep hgb >7g/dL, plt<10,000  - plts and WBC recovering post chemo but remains pancytopenic secondary to chemo/disease.      3.  :   Initial radical prostatectomy and bilateral lymph node dissection. November 15, 2017. Prostatic adenocarcinoma [place] 4+3 = 7 with tertiary pattern 5. Tumor involves 45% of total surface area. Positive for extensive extraprostatic extension. Positive for bilateral seminal vesicle invasion. Multiple margins positive. Lymphovascular invasion not identified. Perineural invasion was noted. Lymph nodes negative. 3 were examined (2 right obturator and 1  "left obturator).    Completed radiation to the prostate fossa and pelvic lymph nodes at [facility] early May 2018. He received 4500 cGy in 25 fractions. He then had 2340 cGy boost in 13 fractions to the prostatic fossa, for a total dose of 6240 cGy in 38 treatment fractions delivered over 54 days. He did not receive hormonal therapy:       4.  ID:   - prophy: ACV BID  - C diff colitis: finished vancomycin 6/23, treated for 7 days. Ppx bid vanco started 6/30 (neutropenic; on levaquin).      5.  FEN/Renal:   - Chemistries not drawn today.      6. GI:  Fullness to left groin: no palpable mass but general fullness. Per pt hx appears to fluctuate. Possible inguinal hernia vs. Lymphoma; last CT does mention inguinal LAD. If persists or becomes larger could pursue ultrasound for further imaging.   Abdominal Pain secondary to cancer: oxycodone prn.    Transaminitis: suspect related to cancer. Improving.      7. CV: Cardiology consulted for SOB, elevated troponin during hospital admission 6/24. Per their note: \"Likely Type II MI in setting of demand ischemia from tachycardia, acute on chronic anemia, acute on chronic illness. No evidence of heart failure at this time.\"   - Fasting lipid panel 6/27 with total cholesterol 157, high triglycerides at 362   - Recommend checking lipid profile and outpatient follow up for non-obstructive CAD workup when he is more stable. Cardiology follow up in 1 month (due end of July).      RTC: 7/19 for labs and provider visit per research protocol .      20 minutes spent on the date of the encounter doing chart review, history and exam, documentation and further activities per the note    Topher Haro PA-C  [phone]        Again, thank you for allowing me to participate in the care of your patient.        Sincerely,        BMT Advanced Practice Provider    "

## 2021-07-15 NOTE — PROGRESS NOTES
[id number]: Study Visit Note    Subject name: Juvenal Blake     Visit: day 18    Did the study visit occur within the appropriate window allowed by the protocol? yes      Since the last study visit, He he states he is doing better daily. He states he is eating better, the belly pain is better, his abdomen is decreased in size, he has no LE edema. He states his energy is better. There is no evidence of reaction at injection sites. He states he did not get fever or chills post injection or post infusion at home. He forgot to bring in his sentence log again. He states as does his wife that it is at home and he will bring it in next visit.     I have personally interviewed Juvenal Blake and reviewed his medical record for adverse events and concomitant medications and these have been recorded on the corresponding logs in Juvenal Blake's research file.     Juvenal Blake was given the opportunity to ask any trial related questions.  Please see provider progress note for physical exam and other clinical information. Labs were reviewed - any significant lab values were addressed and reviewed.    Sarah Beth Kessler RN

## 2021-07-15 NOTE — PROGRESS NOTES
BMT Daily Progress  Note      Juvenal Blake is a 55 year old male PMH significant for refractory NHL, undergoing  NK infusion. Admitted with rituxan (biosimilar) infusion reaction 6/24 and remained admitted through completion of lymphodepleting chemo and  NK cell infusion.  Currently day +18     HPI: Returns for follow up. No acute medical issues. Abdomen is significantly less distended but still has some discomfort with palpation. No fevers following last IL2 injection and cell infusion. No new acute medical complaints.     ROS:  8 point ROS negative except as above.      Physical Exam:   There were no vitals taken for this visit.- reviewed in flowsheets, WNL    Wt Readings from Last 5 Encounters:   07/12/21 101.9 kg (224 lb 11.2 oz)   07/12/21 103 kg (227 lb)   07/08/21 101.6 kg (224 lb)   07/06/21 102.6 kg (226 lb 4.8 oz)   07/06/21 104 kg (229 lb 4.8 oz)     General: No acute distress. ECOG 1. Pale.   Eyes: PERRL  Lungs: CTA bilaterally, no increased WOB.   Cardiovascular: RRR, quiet systolic murmur  Abdominal/Rectal: mild tenderness to epigastric region (unchanged), still with mild distention.   Lymphatics:no edema, wearing compression stockings.   Skin: no rash; pale  Neuro: A&O, answering questions appropriately  Additional Findings: PICC with bruise at insertion; portacath c/d/i    ICANS: 10/10- pt sentence log is in research folder in the [facility]       Labs:  Lab Results   Component Value Date    WBC 4.2 07/15/2021    ANEU 0.5 (L) 07/08/2021    HGB 9.0 (L) 07/15/2021    HCT 29.0 (L) 07/15/2021     07/15/2021     07/12/2021    POTASSIUM 4.0 07/12/2021    CHLORIDE 109 07/12/2021    CO2 26 07/12/2021     (H) 07/12/2021    BUN 10 07/12/2021    CR 1.00 07/12/2021    MAG 2.0 07/12/2021    INR 1.07 06/28/2021    BILITOTAL 0.4 07/12/2021    AST 30 07/12/2021    ALT 39 07/12/2021    ALKPHOS 184 (H) 07/12/2021    PROTTOTAL 5.8 (L) 07/12/2021    ALBUMIN 2.9 (L) 07/12/2021       I have  assessed all abnormal lab values for their clinical significance and any values considered clinically significant have been addressed in the assessment and plan.         Assessment and Plan:   Juvenal Blake is a 55 year old male PMH significant for refractory NHL, undergoing  NK infusion. Admitted with rituxan (biosimilar) infusion reaction 6/24 and remained admitted through completion of lymphodepleting chemo and  NK cell infusion.  Currently day +18       1.  Follicular Lymphoma:  - See previous notes regarding prior therapy. Most recently received Haplo NAM-NK therapy 9/2020 and idelalisib 10/2020 through 6/2021.  - Now on  therapy, cycle 1.   - s/p Flu/Cy/rituxan LD chemo (Rituxan infusion abbreviated d/t severe reaction- see below)  -  infusion 6/28, 7/6, and 7/12.   - Currently day +18. Bone marrow biopsy completed today.   - Remains on allopurinol.     Rituxan (biosimilar) reaction 6/24: Previously tolerated rituxan many times. Received 2 doses epinephrine, albuterol, and methylpred. Required up to 6L of oxygen. Now resolved.      2.  HEME: Keep hgb >7g/dL, plt<10,000  - plts and WBC recovering post chemo but remains pancytopenic secondary to chemo/disease.      3.  :   Initial radical prostatectomy and bilateral lymph node dissection. November 15, 2017. Prostatic adenocarcinoma [place] 4+3 = 7 with tertiary pattern 5. Tumor involves 45% of total surface area. Positive for extensive extraprostatic extension. Positive for bilateral seminal vesicle invasion. Multiple margins positive. Lymphovascular invasion not identified. Perineural invasion was noted. Lymph nodes negative. 3 were examined (2 right obturator and 1 left obturator).    Completed radiation to the prostate fossa and pelvic lymph nodes at [facility] early May 2018. He received 4500 cGy in 25 fractions. He then had 2340 cGy boost in 13 fractions to the prostatic fossa, for a total dose of 6240 cGy in 38 treatment  "fractions delivered over 54 days. He did not receive hormonal therapy:       4.  ID:   - prophy: ACV BID  - C diff colitis: finished vancomycin 6/23, treated for 7 days. Ppx bid vanco started 6/30 (neutropenic; on levaquin).      5.  FEN/Renal:   - Chemistries not drawn today.      6. GI:  Fullness to left groin: no palpable mass but general fullness. Per pt hx appears to fluctuate. Possible inguinal hernia vs. Lymphoma; last CT does mention inguinal LAD. If persists or becomes larger could pursue ultrasound for further imaging.   Abdominal Pain secondary to cancer: oxycodone prn.    Transaminitis: suspect related to cancer. Improving.      7. CV: Cardiology consulted for SOB, elevated troponin during hospital admission 6/24. Per their note: \"Likely Type II MI in setting of demand ischemia from tachycardia, acute on chronic anemia, acute on chronic illness. No evidence of heart failure at this time.\"   - Fasting lipid panel 6/27 with total cholesterol 157, high triglycerides at 362   - Recommend checking lipid profile and outpatient follow up for non-obstructive CAD workup when he is more stable. Cardiology follow up in 1 month (due end of July).      RTC: 7/19 for labs and provider visit per research protocol .      20 minutes spent on the date of the encounter doing chart review, history and exam, documentation and further activities per the note    Topher Haro PA-C  [phone]    "

## 2021-07-15 NOTE — PROGRESS NOTES
BMT ONC Adult Bone Marrow Biopsy Procedure Note  July 15, 2021  /78   Pulse 71   Temp 98  F (36.7  C) (Oral)   Resp 16   SpO2 100%      Learning needs assessment complete within 12 months? YES    DIAGNOSIS: FL, s/p      PROCEDURE: Unilateral Bone Marrow Biopsy and Unilateral Aspirate    LOCATION: [facility] [place] [facility]    Patient s identification was positively verified by verbal identification and invasive procedure safety checklist was completed. Informed consent was obtained. Following the administration of Midazolam as pre-medication, patient was placed in the prone position and prepped and draped in a sterile manner. Approximately 20 cc of 1% Lidocaine was used over the left posterior iliac spine. Following this a 3 mm incision was made. Trephine bone marrow core(s) was (were) obtained from the [facility]. Bone marrow aspirates were obtained from the [facility]. Aspirates were sent for morphology, immunophenotyping, cytogenetics, molecular diagnostics and research. A total of approximately 40 ml of marrow was aspirated. Following this procedure a sterile dressing was applied to the bone marrow biopsy site(s). The patient was placed in the supine position to maintain pressure on the biopsy site. Post-procedure wound care instructions were given.     Complications: NO    Pre-procedural pain: 0 out of 10 on the numeric pain rating scale.     Procedural pain: 6 out of 10 on the numeric pain rating scale.     Post-procedural pain assessment: 0 out of 10 on the numeric pain rating scale.     Interventions: NO    Length of procedure:20 minutes or less      Procedure performed by: Topher Haro PA-C

## 2021-07-15 NOTE — NURSING NOTE
BMT Teaching Flowsheet    Juvenal Blake is a 55 year old male  The primary encounter diagnosis was Follicular lymphoma grade i, lymph nodes of multiple sites (H). A diagnosis of Follicular lymphoma grade I, unspecified body region (H) was also pertinent to this visit.    Teaching Topic: Post Procedure Instructions for BMBX    Person(s) involved in teaching: Patient and Spouse  Motivation Level  Asks Questions: Yes  Eager to Learn: Yes  Cooperative: Yes  Receptive (willing/able to accept information): Yes  Any cultural factors/Episcopal beliefs that may influence understanding or compliance? No    Patient and Family demonstrates understanding of the following:  - Reason for the appointment, diagnosis and treatment plan: Yes  - Knowledge of proper use of medications and conditions for which they are ordered (with special attention to potential side effects or drug interactions): Yes  - Which situations necessitate calling provider and whom to contact: Yes. Pt confirms they have numbers for who to call with questions/concerns both during and after clinic hours.     Teaching concerns addressed: Post procedure instructions for BMBX reviewed with Pt and his Wife. Pt with no further questions at this time.     Proper use and care of (medical equipment, care aids, etc.) NA  Pain management techniques: Yes- Pt has Oxycodone to use for Pain. This RN also suggested Tylenol.   Patient instructed on hand hygiene: NA  How and/when to access community resources: NA    Infection Control:  Patient and Family demonstrates understanding of the following:  Surgical procedure site care taught Yes  Signs and symptoms of infection taught Yes  Wound care taught NA  Central venous catheter care taught NA    Instructional Materials Used/Given:   Copy of Post Procedure Instructional Handout, Verbal Instruction.    Time spent with patient: 5 minutes.      Specific Concerns: NA

## 2021-07-15 NOTE — LETTER
7/15/2021         RE: Juvenal Blake  [address] [place] [address]        Dear Colleague,    Thank you for referring your patient, Juvenal Blake, to the [facility] BLOOD AND MARROW TRANSPLANT PROGRAM [place]. Please see a copy of my visit note below.    BMT ONC Adult Bone Marrow Biopsy Procedure Note  July 15, 2021  /78   Pulse 71   Temp 98  F (36.7  C) (Oral)   Resp 16   SpO2 100%      Learning needs assessment complete within 12 months? YES    DIAGNOSIS: FL, s/p      PROCEDURE: Unilateral Bone Marrow Biopsy and Unilateral Aspirate    LOCATION: [facility] [place] 2[facility]    Patient s identification was positively verified by verbal identification and invasive procedure safety checklist was completed. Informed consent was obtained. Following the administration of Midazolam as pre-medication, patient was placed in the prone position and prepped and draped in a sterile manner. Approximately 20 cc of 1% Lidocaine was used over the left posterior iliac spine. Following this a 3 mm incision was made. Trephine bone marrow core(s) was (were) obtained from the [facility]. Bone marrow aspirates were obtained from the [facility]. Aspirates were sent for morphology, immunophenotyping, cytogenetics, molecular diagnostics and research. A total of approximately 40 ml of marrow was aspirated. Following this procedure a sterile dressing was applied to the bone marrow biopsy site(s). The patient was placed in the supine position to maintain pressure on the biopsy site. Post-procedure wound care instructions were given.     Complications: NO    Pre-procedural pain: 0 out of 10 on the numeric pain rating scale.     Procedural pain: 6 out of 10 on the numeric pain rating scale.     Post-procedural pain assessment: 0 out of 10 on the numeric pain rating scale.     Interventions: NO    Length of procedure:20 minutes or less      Procedure performed by: Topher Haro PA-C        Again, thank you for allowing me to  participate in the care of your patient.        Sincerely,        [facility]ARROW [facility]

## 2021-07-15 NOTE — NURSING NOTE
"Oncology Rooming Note    July 15, 2021 10:28 AM   Juvenal Blake is a 55 year old male who presents for:    Chief Complaint   Patient presents with     RECHECK     Pt here for routine visit with MD, Labs, and BMBX today. Pt is s/p BMT and NK for Follicular Lymphoma.      Initial Vitals: /78   Pulse 71   Temp 98  F (36.7  C) (Oral)   Resp 16   SpO2 100%  Estimated body mass index is 34.65 kg/m  as calculated from the following:    Height as of 7/12/21: 1.715 m (5' 7.52\").    Weight as of 7/12/21: 101.9 kg (224 lb 11.2 oz). There is no height or weight on file to calculate BSA.  Data Unavailable Comment: Data Unavailable   No LMP for male patient.  Allergies reviewed: Yes  Medications reviewed: Yes    Medications: Medication refills not needed today.  Pharmacy name entered into [employer]: [id number] - [place], [place] - [address] AT [facility]    Clinical concerns: Pt here for labs and BMBX today. Pt here with his Wife as his . Pt feeling well- no complaints. Pt would like IV Versed with today's procedure. Pt is not taking any blood thinners.  THOMAS Arana was notified.      Claudien Armendariz RN                "

## 2021-07-15 NOTE — NURSING NOTE
"Pt tolerated BMBX well with Versed 2 mg IV Push administered prior. Procedure performed by THOMAS Arana. Following procedure Pt monitored in the Supine position x 30 minutes. Upon discharge VSS, rating Bx site Pain a \"1/10\" (see flowsheet), pressure drsg C/D/I, A&O x 3, Up Independently. Pt's Wife here as his . Post procedure instructions reviewed with Pt and his wife- no further questions at this time. Pt left clinic ambulatory in stable condition.   "

## 2021-07-16 LAB
PATH REPORT.COMMENTS IMP SPEC: NORMAL
PATH REPORT.FINAL DX SPEC: NORMAL
PATH REPORT.MICROSCOPIC SPEC OTHER STN: NORMAL
PATH REPORT.RELEVANT HX SPEC: NORMAL

## 2021-07-19 ENCOUNTER — APPOINTMENT (OUTPATIENT)
Dept: LAB | Facility: CLINIC | Age: 56
End: 2021-07-19
Attending: PHYSICIAN ASSISTANT
Payer: COMMERCIAL

## 2021-07-19 ENCOUNTER — ONCOLOGY VISIT (OUTPATIENT)
Dept: TRANSPLANT | Facility: CLINIC | Age: 56
End: 2021-07-19
Attending: PHYSICIAN ASSISTANT
Payer: COMMERCIAL

## 2021-07-19 VITALS
TEMPERATURE: 97.5 F | SYSTOLIC BLOOD PRESSURE: 120 MMHG | BODY MASS INDEX: 34.88 KG/M2 | OXYGEN SATURATION: 98 % | RESPIRATION RATE: 16 BRPM | DIASTOLIC BLOOD PRESSURE: 73 MMHG | WEIGHT: 226.2 LBS | HEART RATE: 75 BPM

## 2021-07-19 DIAGNOSIS — C82.08 FOLLICULAR LYMPHOMA GRADE I, LYMPH NODES OF MULTIPLE SITES (H): Primary | ICD-10-CM

## 2021-07-19 LAB
ALBUMIN SERPL-MCNC: 3.3 G/DL (ref 3.4–5)
ALP SERPL-CCNC: 126 U/L (ref 40–150)
ALT SERPL W P-5'-P-CCNC: 33 U/L (ref 0–70)
ANION GAP SERPL CALCULATED.3IONS-SCNC: 5 MMOL/L (ref 3–14)
AST SERPL W P-5'-P-CCNC: 18 U/L (ref 0–45)
BASOPHILS # BLD AUTO: 0.1 10E3/UL (ref 0–0.2)
BASOPHILS NFR BLD AUTO: 1 %
BILIRUB DIRECT SERPL-MCNC: 0.2 MG/DL (ref 0–0.2)
BILIRUB SERPL-MCNC: 0.5 MG/DL (ref 0.2–1.3)
BUN SERPL-MCNC: 12 MG/DL (ref 7–30)
CALCIUM SERPL-MCNC: 9 MG/DL (ref 8.5–10.1)
CHLORIDE BLD-SCNC: 109 MMOL/L (ref 94–109)
CO2 SERPL-SCNC: 27 MMOL/L (ref 20–32)
CREAT SERPL-MCNC: 0.94 MG/DL (ref 0.66–1.25)
EOSINOPHIL # BLD AUTO: 0.2 10E3/UL (ref 0–0.7)
EOSINOPHIL NFR BLD AUTO: 3 %
ERYTHROCYTE [DISTWIDTH] IN BLOOD BY AUTOMATED COUNT: 19.7 % (ref 10–15)
GFR SERPL CREATININE-BSD FRML MDRD: 90 ML/MIN/1.73M2
GLUCOSE BLD-MCNC: 118 MG/DL (ref 70–99)
HCT VFR BLD AUTO: 30.2 % (ref 40–53)
HGB BLD-MCNC: 9.5 G/DL (ref 13.3–17.7)
IMM GRANULOCYTES # BLD: 0.1 10E3/UL
IMM GRANULOCYTES NFR BLD: 1 %
LDH SERPL L TO P-CCNC: 150 U/L (ref 85–227)
LYMPHOCYTES # BLD AUTO: 1.8 10E3/UL (ref 0.8–5.3)
LYMPHOCYTES NFR BLD AUTO: 29 %
MAGNESIUM SERPL-MCNC: 2.1 MG/DL (ref 1.6–2.3)
MCH RBC QN AUTO: 30.3 PG (ref 26.5–33)
MCHC RBC AUTO-ENTMCNC: 31.5 G/DL (ref 31.5–36.5)
MCV RBC AUTO: 96 FL (ref 78–100)
MONOCYTES # BLD AUTO: 0.5 10E3/UL (ref 0–1.3)
MONOCYTES NFR BLD AUTO: 8 %
NEUTROPHILS # BLD AUTO: 3.6 10E3/UL (ref 1.6–8.3)
NEUTROPHILS NFR BLD AUTO: 58 %
NRBC # BLD AUTO: 0 10E3/UL
NRBC BLD AUTO-RTO: 0 /100
PHOSPHATE SERPL-MCNC: 2.8 MG/DL (ref 2.5–4.5)
PLATELET # BLD AUTO: 140 10E3/UL (ref 150–450)
POTASSIUM BLD-SCNC: 4 MMOL/L (ref 3.4–5.3)
PROT SERPL-MCNC: 5.9 G/DL (ref 6.8–8.8)
RBC # BLD AUTO: 3.14 10E6/UL (ref 4.4–5.9)
SODIUM SERPL-SCNC: 141 MMOL/L (ref 133–144)
WBC # BLD AUTO: 6.2 10E3/UL (ref 4–11)

## 2021-07-19 PROCEDURE — G0463 HOSPITAL OUTPT CLINIC VISIT: HCPCS

## 2021-07-19 PROCEDURE — 85025 COMPLETE CBC W/AUTO DIFF WBC: CPT

## 2021-07-19 PROCEDURE — 82247 BILIRUBIN TOTAL: CPT

## 2021-07-19 PROCEDURE — 99214 OFFICE O/P EST MOD 30 MIN: CPT

## 2021-07-19 PROCEDURE — 36592 COLLECT BLOOD FROM PICC: CPT

## 2021-07-19 PROCEDURE — 82040 ASSAY OF SERUM ALBUMIN: CPT

## 2021-07-19 PROCEDURE — 99001 SPECIMEN HANDLING PT-LAB: CPT

## 2021-07-19 PROCEDURE — 83615 LACTATE (LD) (LDH) ENZYME: CPT

## 2021-07-19 PROCEDURE — 84100 ASSAY OF PHOSPHORUS: CPT

## 2021-07-19 PROCEDURE — 250N000011 HC RX IP 250 OP 636: Performed by: PHYSICIAN ASSISTANT

## 2021-07-19 PROCEDURE — 82248 BILIRUBIN DIRECT: CPT

## 2021-07-19 PROCEDURE — 83735 ASSAY OF MAGNESIUM: CPT

## 2021-07-19 RX ORDER — HEPARIN SODIUM,PORCINE 10 UNIT/ML
5 VIAL (ML) INTRAVENOUS ONCE
Status: COMPLETED | OUTPATIENT
Start: 2021-07-19 | End: 2021-07-19

## 2021-07-19 RX ADMIN — Medication 5 ML: at 10:06

## 2021-07-19 ASSESSMENT — PAIN SCALES - GENERAL: PAINLEVEL: MILD PAIN (2)

## 2021-07-19 NOTE — NURSING NOTE
Chief Complaint   Patient presents with     Blood Draw     Labs drawn from CVC by RN in lab. Vitals taken. Checked into next appointment.      CVC accessed by RN in lab, labs drawn.  Both lines flushed with heparin.    Vital signs taken.  Pt checked in to next appointment.     Bronwyn Sarabia RN

## 2021-07-19 NOTE — PROGRESS NOTES
BMT Daily Progress  Note      Juvenal Blake is a 55 year old male PMH significant for refractory NHL, undergoing  NK infusion. Admitted with rituxan (biosimilar) infusion reaction 6/24 and remained admitted through completion of lymphodepleting chemo and  NK cell infusion.  Currently day +22     HPI: Returns for follow up. No acute medical issues. Abdomen still slightly distended and has some discomfort with deep palpation or going over bumps on the road but overall much improved. No recurrence of lower extremity swelling. Eating and drinking well over the weekend, no n/v/d. No fevers, chills or infectious symptoms.     ROS:  8 point ROS negative except as above.      Physical Exam:   /73 (BP Location: Right arm, Patient Position: Sitting, Cuff Size: Adult Regular)   Pulse 75   Temp 97.5  F (36.4  C) (Oral)   Resp 16   Wt 102.6 kg (226 lb 3.2 oz)   SpO2 98%   BMI 34.88 kg/m  - reviewed in flowsheets, WNL    Wt Readings from Last 5 Encounters:   07/19/21 102.6 kg (226 lb 3.2 oz)   07/12/21 101.9 kg (224 lb 11.2 oz)   07/12/21 103 kg (227 lb)   07/08/21 101.6 kg (224 lb)   07/06/21 102.6 kg (226 lb 4.8 oz)     General: No acute distress. ECOG 1. Pale.   Eyes: PERRL  Lungs: coarse breath sounds in the bases bilaterally but otherwise CTA, no increased WOB.   Cardiovascular: RRR, no m/r/g  Abdominal/Rectal: mild tenderness to epigastric region (unchanged), still with mild distention.   Lymphatics: no edema, wearing compression stockings.   Skin: no rash; pale  Neuro: A&O, answering questions appropriately  Additional Findings: PICC with bruise at insertion; portacath c/d/i    ICANS: 10/10- pt sentence log is in research folder in the [facility]       Labs:  Lab Results   Component Value Date    WBC 6.2 07/19/2021    ANEU 0.5 (L) 07/08/2021    HGB 9.5 (L) 07/19/2021    HCT 30.2 (L) 07/19/2021     (L) 07/19/2021     07/12/2021    POTASSIUM 4.0 07/12/2021    CHLORIDE 109 07/12/2021    CO2 26  07/12/2021     (H) 07/12/2021    BUN 10 07/12/2021    CR 1.00 07/12/2021    MAG 2.0 07/12/2021    INR 1.07 06/28/2021    BILITOTAL 0.4 07/12/2021    AST 30 07/12/2021    ALT 39 07/12/2021    ALKPHOS 184 (H) 07/12/2021    PROTTOTAL 5.8 (L) 07/12/2021    ALBUMIN 2.9 (L) 07/12/2021       I have assessed all abnormal lab values for their clinical significance and any values considered clinically significant have been addressed in the assessment and plan.         Assessment and Plan:   Juvenal Blake is a 55 year old male PMH significant for refractory NHL, undergoing  NK infusion. Admitted with rituxan (biosimilar) infusion reaction 6/24 and remained admitted through completion of lymphodepleting chemo and  NK cell infusion.  Currently day +22       1.  Follicular Lymphoma:  - See previous notes regarding prior therapy. Most recently received Haplo NAM-NK therapy 9/2020 and idelalisib 10/2020 through 6/2021.  - Now on  therapy, cycle 1.   - s/p Flu/Cy/rituxan LD chemo (Rituxan infusion abbreviated d/t severe reaction- see below)  -  infusion 6/28, 7/6, and 7/12.   - Bone marrow biopsy completed 7/15. Results pending. PET scan next week.   - Remains on allopurinol.     Rituxan (biosimilar) reaction 6/24: Previously tolerated rituxan many times. Received 2 doses epinephrine, albuterol, and methylpred. Required up to 6L of oxygen. Now resolved.      2.  HEME: Keep hgb >7g/dL, plt<10,000  - anemic due to recent chemo/disease.   - WBC recovered.       3.  :   Initial radical prostatectomy and bilateral lymph node dissection. November 15, 2017. Prostatic adenocarcinoma [place] 4+3 = 7 with tertiary pattern 5. Tumor involves 45% of total surface area. Positive for extensive extraprostatic extension. Positive for bilateral seminal vesicle invasion. Multiple margins positive. Lymphovascular invasion not identified. Perineural invasion was noted. Lymph nodes negative. 3 were examined (2 right  "obturator and 1 left obturator).    Completed radiation to the prostate fossa and pelvic lymph nodes at [facility] early May 2018. He received 4500 cGy in 25 fractions. He then had 2340 cGy boost in 13 fractions to the prostatic fossa, for a total dose of 6240 cGy in 38 treatment fractions delivered over 54 days. He did not receive hormonal therapy:       4.  ID:   - prophy: ACV BID  - C diff colitis: finished vancomycin 6/23, treated for 7 days. Ppx bid vanco while neutropenic, now off.      5.  FEN/Renal:   - Cr and lytes WNL      6. GI:  Fullness to left groin: no palpable mass but general fullness. Per pt hx appears to fluctuate. Possible inguinal hernia vs. Lymphoma; last CT does mention inguinal LAD. If persists or becomes larger could pursue ultrasound for further imaging.   Abdominal Pain secondary to cancer: oxycodone prn.    Transaminitis: suspect related to cancer. Improving.      7. CV: Cardiology consulted for SOB, elevated troponin during hospital admission 6/24. Per their note: \"Likely Type II MI in setting of demand ischemia from tachycardia, acute on chronic anemia, acute on chronic illness. No evidence of heart failure at this time.\"   - Fasting lipid panel 6/27 with total cholesterol 157, high triglycerides at 362   - Recommend checking lipid profile and outpatient follow up for non-obstructive CAD workup when he is more stable. Cardiology follow up in 1 month (due end of July).      RTC: 1 week for PET scan. Follow up to review labs and restaging with Dr. Terry 7/29    30 minutes spent on the date of the encounter doing chart review, history and exam, documentation and further activities per the note    Topher Haro PA-C  [phone]    "

## 2021-07-19 NOTE — LETTER
7/19/2021         RE: Juvenal Blake  [address] [place] [address]        Dear Colleague,    Thank you for referring your patient, Juvenal Blake, to the [facility] [place]. Please see a copy of my visit note below.    BMT Daily Progress  Note      Juvenal Blake is a 55 year old male PMH significant for refractory NHL, undergoing  NK infusion. Admitted with rituxan (biosimilar) infusion reaction 6/24 and remained admitted through completion of lymphodepleting chemo and  NK cell infusion.  Currently day +22     HPI: Returns for follow up. No acute medical issues. Abdomen still slightly distended and has some discomfort with deep palpation or going over bumps on the road but overall much improved. No recurrence of lower extremity swelling. Eating and drinking well over the weekend, no n/v/d. No fevers, chills or infectious symptoms.     ROS:  8 point ROS negative except as above.      Physical Exam:   /73 (BP Location: Right arm, Patient Position: Sitting, Cuff Size: Adult Regular)   Pulse 75   Temp 97.5  F (36.4  C) (Oral)   Resp 16   Wt 102.6 kg (226 lb 3.2 oz)   SpO2 98%   BMI 34.88 kg/m  - reviewed in flowsheets, WNL    Wt Readings from Last 5 Encounters:   07/19/21 102.6 kg (226 lb 3.2 oz)   07/12/21 101.9 kg (224 lb 11.2 oz)   07/12/21 103 kg (227 lb)   07/08/21 101.6 kg (224 lb)   07/06/21 102.6 kg (226 lb 4.8 oz)     General: No acute distress. ECOG 1. Pale.   Eyes: PERRL  Lungs: coarse breath sounds in the bases bilaterally but otherwise CTA, no increased WOB.   Cardiovascular: RRR, no m/r/g  Abdominal/Rectal: mild tenderness to epigastric region (unchanged), still with mild distention.   Lymphatics: no edema, wearing compression stockings.   Skin: no rash; pale  Neuro: A&O, answering questions appropriately  Additional Findings: PICC with bruise at insertion; portacath c/d/i    ICANS: 10/10- pt sentence log is in research  folder in the [facility]       Labs:  Lab Results   Component Value Date    WBC 6.2 07/19/2021    ANEU 0.5 (L) 07/08/2021    HGB 9.5 (L) 07/19/2021    HCT 30.2 (L) 07/19/2021     (L) 07/19/2021     07/12/2021    POTASSIUM 4.0 07/12/2021    CHLORIDE 109 07/12/2021    CO2 26 07/12/2021     (H) 07/12/2021    BUN 10 07/12/2021    CR 1.00 07/12/2021    MAG 2.0 07/12/2021    INR 1.07 06/28/2021    BILITOTAL 0.4 07/12/2021    AST 30 07/12/2021    ALT 39 07/12/2021    ALKPHOS 184 (H) 07/12/2021    PROTTOTAL 5.8 (L) 07/12/2021    ALBUMIN 2.9 (L) 07/12/2021       I have assessed all abnormal lab values for their clinical significance and any values considered clinically significant have been addressed in the assessment and plan.         Assessment and Plan:   Juvenal Blake is a 55 year old male PMH significant for refractory NHL, undergoing  NK infusion. Admitted with rituxan (biosimilar) infusion reaction 6/24 and remained admitted through completion of lymphodepleting chemo and  NK cell infusion.  Currently day +22       1.  Follicular Lymphoma:  - See previous notes regarding prior therapy. Most recently received Haplo NAM-NK therapy 9/2020 and idelalisib 10/2020 through 6/2021.  - Now on  therapy, cycle 1.   - s/p Flu/Cy/rituxan LD chemo (Rituxan infusion abbreviated d/t severe reaction- see below)  -  infusion 6/28, 7/6, and 7/12.   - Bone marrow biopsy completed 7/15. Results pending. PET scan next week.   - Remains on allopurinol.     Rituxan (biosimilar) reaction 6/24: Previously tolerated rituxan many times. Received 2 doses epinephrine, albuterol, and methylpred. Required up to 6L of oxygen. Now resolved.      2.  HEME: Keep hgb >7g/dL, plt<10,000  - anemic due to recent chemo/disease.   - WBC recovered.       3.  :   Initial radical prostatectomy and bilateral lymph node dissection. November 15, 2017. Prostatic adenocarcinoma [place] 4+3 = 7 with tertiary pattern 5. Tumor  "involves 45% of total surface area. Positive for extensive extraprostatic extension. Positive for bilateral seminal vesicle invasion. Multiple margins positive. Lymphovascular invasion not identified. Perineural invasion was noted. Lymph nodes negative. 3 were examined (2 right obturator and 1 left obturator).    Completed radiation to the prostate fossa and pelvic lymph nodes at [facility] early May 2018. He received 4500 cGy in 25 fractions. He then had 2340 cGy boost in 13 fractions to the prostatic fossa, for a total dose of 6240 cGy in 38 treatment fractions delivered over 54 days. He did not receive hormonal therapy:       4.  ID:   - prophy: ACV BID  - C diff colitis: finished vancomycin 6/23, treated for 7 days. Ppx bid vanco while neutropenic, now off.      5.  FEN/Renal:   - Cr and lytes WNL      6. GI:  Fullness to left groin: no palpable mass but general fullness. Per pt hx appears to fluctuate. Possible inguinal hernia vs. Lymphoma; last CT does mention inguinal LAD. If persists or becomes larger could pursue ultrasound for further imaging.   Abdominal Pain secondary to cancer: oxycodone prn.    Transaminitis: suspect related to cancer. Improving.      7. CV: Cardiology consulted for SOB, elevated troponin during hospital admission 6/24. Per their note: \"Likely Type II MI in setting of demand ischemia from tachycardia, acute on chronic anemia, acute on chronic illness. No evidence of heart failure at this time.\"   - Fasting lipid panel 6/27 with total cholesterol 157, high triglycerides at 362   - Recommend checking lipid profile and outpatient follow up for non-obstructive CAD workup when he is more stable. Cardiology follow up in 1 month (due end of July).      RTC: 1 week for PET scan. Follow up to review labs and restaging with Dr. Terry 7/29    30 minutes spent on the date of the encounter doing chart review, history and exam, documentation and further activities per the note    Topher " MAL Haro  [phone]        Again, thank you for allowing me to participate in the care of your patient.        Sincerely,        BMT Advanced Practice Provider

## 2021-07-19 NOTE — NURSING NOTE
"Oncology Rooming Note    July 19, 2021 10:20 AM   Juvenal Blake is a 55 year old male who presents for:    Chief Complaint   Patient presents with     Blood Draw     Labs drawn from CVC by RN in lab. Vitals taken. Checked into next appointment.      Oncology Clinic Visit     FOLLICULAR LYMPHOMA      Initial Vitals: /73 (BP Location: Right arm, Patient Position: Sitting, Cuff Size: Adult Regular)   Pulse 75   Temp 97.5  F (36.4  C) (Oral)   Resp 16   Wt 102.6 kg (226 lb 3.2 oz)   SpO2 98%   BMI 34.88 kg/m   Estimated body mass index is 34.88 kg/m  as calculated from the following:    Height as of 7/12/21: 1.715 m (5' 7.52\").    Weight as of this encounter: 102.6 kg (226 lb 3.2 oz). Body surface area is 2.21 meters squared.  Mild Pain (2) Comment: Data Unavailable   No LMP for male patient.  Allergies reviewed: Yes  Medications reviewed: Yes    Medications: Medication refills not needed today.  Pharmacy name entered into [employer]: [facility] #[id number] - [place], [place] - [address] AT [facility]    Clinical concerns: No new concerns today  Topher  was NOT notified.      Natalia Harp            "

## 2021-07-22 LAB
PATH REPORT.ADDENDUM SPEC: NORMAL
PATH REPORT.COMMENTS IMP SPEC: NORMAL
PATH REPORT.COMMENTS IMP SPEC: NORMAL
PATH REPORT.FINAL DX SPEC: NORMAL
PATH REPORT.GROSS SPEC: NORMAL
PATH REPORT.MICROSCOPIC SPEC OTHER STN: NORMAL
PATH REPORT.MICROSCOPIC SPEC OTHER STN: NORMAL
PATH REPORT.RELEVANT HX SPEC: NORMAL

## 2021-07-26 DIAGNOSIS — C82.08 FOLLICULAR LYMPHOMA GRADE I, LYMPH NODES OF MULTIPLE SITES (H): ICD-10-CM

## 2021-07-26 RX ORDER — ACYCLOVIR 800 MG/1
800 TABLET ORAL EVERY 12 HOURS
Qty: 60 TABLET | Refills: 3 | Status: SHIPPED | OUTPATIENT
Start: 2021-07-26 | End: 2021-07-29

## 2021-07-26 NOTE — TELEPHONE ENCOUNTER
RECORDS RECEIVED FROM: Internal   DATE RECEIVED:7.28.1   NOTES STATUS DETAILS   OFFICE NOTE from referring provider    Internal 6.25.21 CARLOS Em [facility]   OFFICE NOTE from other cardiologist    Internal 7.28.20 CARLOS Silva [facility]   DISCHARGE SUMMARY from hospital    N/A    DISCHARGE REPORT from the ER   N/A    OPERATIVE REPORT    N/A    MEDICATION LIST   Internal    LABS     BMP   NA    CBC   Internal 7.19.21   CMP   Internal 7.19.21   Lipids   N/A    TSH   N/A    DIAGNOSTIC PROCEDURES     EKG   Internal 6.24.21  6.8.21   Monitor Reports   N/A    IMAGING (DISC & REPORT)      Echo   Internal 6.25.21  6.7.21  10.2.20    7.30.20 exercise stress echo   Stress Tests   Internal 8.7.20 NM lexiscan stress test   Cath   N/A    MRI/MRA   N/A    CT/CTA   N/A

## 2021-07-27 ENCOUNTER — HOSPITAL ENCOUNTER (OUTPATIENT)
Dept: PET IMAGING | Facility: CLINIC | Age: 56
End: 2021-07-27
Payer: COMMERCIAL

## 2021-07-27 DIAGNOSIS — C82.00 FOLLICULAR LYMPHOMA GRADE I, UNSPECIFIED BODY REGION (H): ICD-10-CM

## 2021-07-27 PROCEDURE — A9552 F18 FDG: HCPCS

## 2021-07-27 PROCEDURE — 70491 CT SOFT TISSUE NECK W/DYE: CPT | Mod: 26 | Performed by: STUDENT IN AN ORGANIZED HEALTH CARE EDUCATION/TRAINING PROGRAM

## 2021-07-27 PROCEDURE — 78816 PET IMAGE W/CT FULL BODY: CPT | Mod: 26 | Performed by: STUDENT IN AN ORGANIZED HEALTH CARE EDUCATION/TRAINING PROGRAM

## 2021-07-27 PROCEDURE — 250N000011 HC RX IP 250 OP 636

## 2021-07-27 PROCEDURE — 74177 CT ABD & PELVIS W/CONTRAST: CPT | Mod: 26 | Performed by: STUDENT IN AN ORGANIZED HEALTH CARE EDUCATION/TRAINING PROGRAM

## 2021-07-27 PROCEDURE — 71260 CT THORAX DX C+: CPT

## 2021-07-27 PROCEDURE — 343N000001 HC RX 343

## 2021-07-27 PROCEDURE — 71260 CT THORAX DX C+: CPT | Mod: 26 | Performed by: STUDENT IN AN ORGANIZED HEALTH CARE EDUCATION/TRAINING PROGRAM

## 2021-07-27 PROCEDURE — 78816 PET IMAGE W/CT FULL BODY: CPT | Mod: PS

## 2021-07-27 PROCEDURE — 70491 CT SOFT TISSUE NECK W/DYE: CPT

## 2021-07-27 RX ORDER — IOPAMIDOL 755 MG/ML
50-135 INJECTION, SOLUTION INTRAVASCULAR ONCE
Status: COMPLETED | OUTPATIENT
Start: 2021-07-27 | End: 2021-07-27

## 2021-07-27 RX ORDER — HEPARIN SODIUM (PORCINE) LOCK FLUSH IV SOLN 100 UNIT/ML 100 UNIT/ML
500 SOLUTION INTRAVENOUS ONCE
Status: COMPLETED | OUTPATIENT
Start: 2021-07-27 | End: 2021-07-27

## 2021-07-27 RX ADMIN — IOPAMIDOL 135 ML: 755 INJECTION, SOLUTION INTRAVENOUS at 08:46

## 2021-07-27 RX ADMIN — FLUDEOXYGLUCOSE F-18 13.48 MCI.: 500 INJECTION, SOLUTION INTRAVENOUS at 07:45

## 2021-07-27 RX ADMIN — Medication 500 UNITS: at 08:47

## 2021-07-28 ENCOUNTER — VIRTUAL VISIT (OUTPATIENT)
Dept: CARDIOLOGY | Facility: CLINIC | Age: 56
End: 2021-07-28
Attending: INTERNAL MEDICINE
Payer: COMMERCIAL

## 2021-07-28 ENCOUNTER — PRE VISIT (OUTPATIENT)
Dept: CARDIOLOGY | Facility: CLINIC | Age: 56
End: 2021-07-28

## 2021-07-28 DIAGNOSIS — I20.89 STABLE ANGINA PECTORIS (H): ICD-10-CM

## 2021-07-28 DIAGNOSIS — R06.09 DYSPNEA ON EXERTION: Primary | ICD-10-CM

## 2021-07-28 PROCEDURE — 99213 OFFICE O/P EST LOW 20 MIN: CPT | Mod: GT | Performed by: INTERNAL MEDICINE

## 2021-07-28 NOTE — LETTER
"7/28/2021       RE: Juvenal Blake  [address] [place] [address]     Dear Colleague,    Thank you for referring your patient, Juvenal Blake, to the [facility] [place] at [facility]. Please see a copy of my visit note below.    Juvenal Blake is a 55 year old who is being evaluated via a billable video visit.      How would you like to obtain your AVS? MyChart  If the video visit is dropped, the invitation should be resent by: [phone]    Will anyone else be joining your video visit? No      Vitals - Patient Reported  Height (Patient Reported): 177.8 cm (5' 10\")  Pain Score: No Pain (0) (SOB on exertion)      Vitals were taken and medications reconciled.    Michael Robert, EMT  12:29 PM    55 year old gentleman with a history of follicular lymphoma who presents for evaluation of worsening and progressive shortness of breath even with minimal activity and a troponin elevation seen on a recent admission concerning for a Type II NSTEMI. Due to these, he had been referred to us for further evaluation.    He denies any chest pain, but does admit to on going shortness of breath that he reports worsens with his treatments / infusions but then improves subsequently, though he remains persistently limited in his functional capacity to minimal activity.    He otherwise denies any chest pain, orthopnea, PND, palpitations, lightheadedness or syncope.    There is no relevant family or social history.    He has been on flutarabine, cyclophosphamide, and rituxan and is now on trial therapy. His chemotherapy does have the potential for cardiac toxicity and he is being monitored with echocardiography.    He has been scheduled for a coronary CT to evaluate his NSTEMI / angina.    Past Medical, social, family histories, medications, and allergies reviewed and updated  10 point ROS of systems including Constitutional, Eyes, Respiratory, Cardiovascular, " Gastroenterology, Genitourinary, Integumentary, Muscularskeletal, Psychiatric were all negative except for pertinent positives noted in my HPI.  GENERAL: Healthy, alert and no distress  EYES: Eyes grossly normal to inspection.  No discharge or erythema, or obvious scleral/conjunctival abnormalities.  RESP: No audible wheeze, cough, or visible cyanosis.  No visible retractions or increased work of breathing.    SKIN: Visible skin clear. No significant rash, abnormal pigmentation or lesions.  NEURO: Cranial nerves grossly intact.  Mentation and speech appropriate for age.  PSYCH: Mentation appears normal, affect normal/bright, judgement and insight intact, normal speech and appearance well-groomed.     EKG 6/24/21  Sinus tachycardia with PVCs    ECHO 6/25/21  Interpretation Summary  Global and regional left ventricular function is normal with an EF of 55-60%.  Global right ventricular function is normal.  This study was compared with the study from 6/7/2021 .  There has been no change.    STRESS 2020   The nuclear stress test is negative for inducible myocardial ischemia or infarction.     Left ventricular function is normal.     There is no prior study for comparison.     Baseline electrocardiogram demonstrates sinus rhythm and left anterior fascicular block.     The stress electrocardiogram showed 1 mmST segment depression in lateral precordial leads which persisted in to recovery period.    Assessment / Plan    1. Stable Angina  -- agree with proceeding with coronary CT, recommend FFR with CT if necessary    2. NSTEMI type II  -- recommend starting moderate to high intensity statin and aspirin; this can be started after we have more information from the CT    3. Follicular Lymphoma status post cardiotoxic chemotherapy  -- recommend follow up with echocardiogram if new symptoms develop and after completion of chemotherapy    RTC annually    Blair Cadet MD  Start 1259  End 1310  [facility]

## 2021-07-28 NOTE — LETTER
"7/28/2021      RE: Juvenal Blake  [address] [place] [address]       Dear Colleague,    Thank you for the opportunity to participate in the care of your patient, Juvenal Blake, at the [facility]. Please see a copy of my visit note below.    Juvenal Blake is a 55 year old who is being evaluated via a billable video visit.      How would you like to obtain your AVS? MyChart  If the video visit is dropped, the invitation should be resent by: [phone]    Will anyone else be joining your video visit? No      Vitals - Patient Reported  Height (Patient Reported): 177.8 cm (5' 10\")  Pain Score: No Pain (0) (SOB on exertion)      Vitals were taken and medications reconciled.    Michael Robert, EMT  12:29 PM    55 year old gentleman with a history of follicular lymphoma who presents for evaluation of worsening and progressive shortness of breath even with minimal activity and a troponin elevation seen on a recent admission concerning for a Type II NSTEMI. Due to these, he had been referred to us for further evaluation.    He denies any chest pain, but does admit to on going shortness of breath that he reports worsens with his treatments / infusions but then improves subsequently, though he remains persistently limited in his functional capacity to minimal activity.    He otherwise denies any chest pain, orthopnea, PND, palpitations, lightheadedness or syncope.    There is no relevant family or social history.    He has been on flutarabine, cyclophosphamide, and rituxan and is now on trial therapy. His chemotherapy does have the potential for cardiac toxicity and he is being monitored with echocardiography.    He has been scheduled for a coronary CT to evaluate his NSTEMI / angina.    Past Medical, social, family histories, medications, and allergies reviewed and updated  10 point ROS of systems including Constitutional, Eyes, " Respiratory, Cardiovascular, Gastroenterology, Genitourinary, Integumentary, Muscularskeletal, Psychiatric were all negative except for pertinent positives noted in my HPI.  GENERAL: Healthy, alert and no distress  EYES: Eyes grossly normal to inspection.  No discharge or erythema, or obvious scleral/conjunctival abnormalities.  RESP: No audible wheeze, cough, or visible cyanosis.  No visible retractions or increased work of breathing.    SKIN: Visible skin clear. No significant rash, abnormal pigmentation or lesions.  NEURO: Cranial nerves grossly intact.  Mentation and speech appropriate for age.  PSYCH: Mentation appears normal, affect normal/bright, judgement and insight intact, normal speech and appearance well-groomed.     EKG 6/24/21  Sinus tachycardia with PVCs    ECHO 6/25/21  Interpretation Summary  Global and regional left ventricular function is normal with an EF of 55-60%.  Global right ventricular function is normal.  This study was compared with the study from 6/7/2021 .  There has been no change.    STRESS 2020   The nuclear stress test is negative for inducible myocardial ischemia or infarction.     Left ventricular function is normal.     There is no prior study for comparison.     Baseline electrocardiogram demonstrates sinus rhythm and left anterior fascicular block.     The stress electrocardiogram showed 1 mmST segment depression in lateral precordial leads which persisted in to recovery period.    Assessment / Plan    1. Stable Angina  -- agree with proceeding with coronary CT, recommend FFR with CT if necessary    2. NSTEMI type II  -- recommend starting moderate to high intensity statin and aspirin; this can be started after we have more information from the CT    3. Follicular Lymphoma status post cardiotoxic chemotherapy  -- recommend follow up with echocardiogram if new symptoms develop and after completion of chemotherapy    RTC annually    Blair Cadet MD  Start 7500  End  1318  [facility]      Please do not hesitate to contact me if you have any questions/concerns.     Sincerely,     Blair Cadet MD

## 2021-07-28 NOTE — PROGRESS NOTES
"Juvenal Blake is a 55 year old who is being evaluated via a billable video visit.      How would you like to obtain your AVS? MyChart  If the video visit is dropped, the invitation should be resent by: [phone]    Will anyone else be joining your video visit? No      Vitals - Patient Reported  Height (Patient Reported): 177.8 cm (5' 10\")  Pain Score: No Pain (0) (SOB on exertion)      Vitals were taken and medications reconciled.    Michael Robert, EMT  12:29 PM    55 year old gentleman with a history of follicular lymphoma who presents for evaluation of worsening and progressive shortness of breath even with minimal activity and a troponin elevation seen on a recent admission concerning for a Type II NSTEMI. Due to these, he had been referred to us for further evaluation.    He denies any chest pain, but does admit to on going shortness of breath that he reports worsens with his treatments / infusions but then improves subsequently, though he remains persistently limited in his functional capacity to minimal activity.    He otherwise denies any chest pain, orthopnea, PND, palpitations, lightheadedness or syncope.    There is no relevant family or social history.    He has been on flutarabine, cyclophosphamide, and rituxan and is now on trial therapy. His chemotherapy does have the potential for cardiac toxicity and he is being monitored with echocardiography.    He has been scheduled for a coronary CT to evaluate his NSTEMI / angina.    Past Medical, social, family histories, medications, and allergies reviewed and updated  10 point ROS of systems including Constitutional, Eyes, Respiratory, Cardiovascular, Gastroenterology, Genitourinary, Integumentary, Muscularskeletal, Psychiatric were all negative except for pertinent positives noted in my HPI.  GENERAL: Healthy, alert and no distress  EYES: Eyes grossly normal to inspection.  No discharge or erythema, or obvious scleral/conjunctival abnormalities.  RESP: No " audible wheeze, cough, or visible cyanosis.  No visible retractions or increased work of breathing.    SKIN: Visible skin clear. No significant rash, abnormal pigmentation or lesions.  NEURO: Cranial nerves grossly intact.  Mentation and speech appropriate for age.  PSYCH: Mentation appears normal, affect normal/bright, judgement and insight intact, normal speech and appearance well-groomed.     EKG 6/24/21  Sinus tachycardia with PVCs    ECHO 6/25/21  Interpretation Summary  Global and regional left ventricular function is normal with an EF of 55-60%.  Global right ventricular function is normal.  This study was compared with the study from 6/7/2021 .  There has been no change.    STRESS 2020   The nuclear stress test is negative for inducible myocardial ischemia or infarction.     Left ventricular function is normal.     There is no prior study for comparison.     Baseline electrocardiogram demonstrates sinus rhythm and left anterior fascicular block.     The stress electrocardiogram showed 1 mmST segment depression in lateral precordial leads which persisted in to recovery period.    Assessment / Plan    1. Stable Angina  -- agree with proceeding with coronary CT, recommend FFR with CT if necessary    2. NSTEMI type II  -- recommend starting moderate to high intensity statin and aspirin; this can be started after we have more information from the CT    3. Follicular Lymphoma status post cardiotoxic chemotherapy  -- recommend follow up with echocardiogram if new symptoms develop and after completion of chemotherapy    RTC annually    Blair Cadet MD  Start 1259  End 1310  [employer]

## 2021-07-28 NOTE — PATIENT INSTRUCTIONS
Patient Instructions:  It was a pleasure to see you in the cardiology clinic today.      If you have any questions, call  Kelly Back RN, at [phone].  Press Option #1 for the [facility], and then press Option #4  We are encouraging the use of [employer] to communicate with your HealthCare Provider    Note the new medications: none  Stop the following medications: none    The results from today include: pending coronary CTA  Please follow up with test results      If you have an urgent need after hours (8:00 am to 4:30 pm) please call [phone] and ask for the cardiology fellow on call.

## 2021-07-29 ENCOUNTER — APPOINTMENT (OUTPATIENT)
Dept: LAB | Facility: CLINIC | Age: 56
End: 2021-07-29
Payer: COMMERCIAL

## 2021-07-29 ENCOUNTER — ONCOLOGY VISIT (OUTPATIENT)
Dept: TRANSPLANT | Facility: CLINIC | Age: 56
End: 2021-07-29
Payer: COMMERCIAL

## 2021-07-29 ENCOUNTER — ALLIED HEALTH/NURSE VISIT (OUTPATIENT)
Dept: TRANSPLANT | Facility: CLINIC | Age: 56
End: 2021-07-29
Payer: COMMERCIAL

## 2021-07-29 VITALS
HEART RATE: 74 BPM | TEMPERATURE: 97.7 F | OXYGEN SATURATION: 100 % | HEIGHT: 68 IN | SYSTOLIC BLOOD PRESSURE: 128 MMHG | RESPIRATION RATE: 16 BRPM | WEIGHT: 234.4 LBS | BODY MASS INDEX: 35.52 KG/M2 | DIASTOLIC BLOOD PRESSURE: 81 MMHG

## 2021-07-29 DIAGNOSIS — C91.00 ALL (ACUTE LYMPHOBLASTIC LEUKEMIA) (H): Primary | ICD-10-CM

## 2021-07-29 DIAGNOSIS — C82.08 FOLLICULAR LYMPHOMA GRADE I, LYMPH NODES OF MULTIPLE SITES (H): Primary | ICD-10-CM

## 2021-07-29 LAB
ALBUMIN SERPL-MCNC: 3.6 G/DL (ref 3.4–5)
ALBUMIN UR-MCNC: NEGATIVE MG/DL
ALP SERPL-CCNC: 95 U/L (ref 40–150)
ALT SERPL W P-5'-P-CCNC: 43 U/L (ref 0–70)
ANION GAP SERPL CALCULATED.3IONS-SCNC: 8 MMOL/L (ref 3–14)
APPEARANCE UR: CLEAR
AST SERPL W P-5'-P-CCNC: 28 U/L (ref 0–45)
BASOPHILS # BLD AUTO: 0 10E3/UL (ref 0–0.2)
BASOPHILS NFR BLD AUTO: 0 %
BILIRUB DIRECT SERPL-MCNC: 0.1 MG/DL (ref 0–0.2)
BILIRUB SERPL-MCNC: 0.4 MG/DL (ref 0.2–1.3)
BILIRUB UR QL STRIP: NEGATIVE
BUN SERPL-MCNC: 9 MG/DL (ref 7–30)
CALCIUM SERPL-MCNC: 8.9 MG/DL (ref 8.5–10.1)
CHLORIDE BLD-SCNC: 108 MMOL/L (ref 94–109)
CO2 SERPL-SCNC: 27 MMOL/L (ref 20–32)
COLOR UR AUTO: YELLOW
CREAT SERPL-MCNC: 0.88 MG/DL (ref 0.66–1.25)
EOSINOPHIL # BLD AUTO: 0.3 10E3/UL (ref 0–0.7)
EOSINOPHIL NFR BLD AUTO: 6 %
ERYTHROCYTE [DISTWIDTH] IN BLOOD BY AUTOMATED COUNT: 19.2 % (ref 10–15)
GFR SERPL CREATININE-BSD FRML MDRD: >90 ML/MIN/1.73M2
GLUCOSE BLD-MCNC: 108 MG/DL (ref 70–99)
GLUCOSE UR STRIP-MCNC: NEGATIVE MG/DL
HCT VFR BLD AUTO: 33.1 % (ref 40–53)
HGB BLD-MCNC: 10.4 G/DL (ref 13.3–17.7)
HGB UR QL STRIP: NEGATIVE
IMM GRANULOCYTES # BLD: 0 10E3/UL
IMM GRANULOCYTES NFR BLD: 1 %
KETONES UR STRIP-MCNC: NEGATIVE MG/DL
LDH SERPL L TO P-CCNC: 152 U/L (ref 85–227)
LEUKOCYTE ESTERASE UR QL STRIP: NEGATIVE
LYMPHOCYTES # BLD AUTO: 1.1 10E3/UL (ref 0.8–5.3)
LYMPHOCYTES NFR BLD AUTO: 20 %
MAGNESIUM SERPL-MCNC: 2 MG/DL (ref 1.6–2.3)
MCH RBC QN AUTO: 30.8 PG (ref 26.5–33)
MCHC RBC AUTO-ENTMCNC: 31.4 G/DL (ref 31.5–36.5)
MCV RBC AUTO: 98 FL (ref 78–100)
MONOCYTES # BLD AUTO: 0.5 10E3/UL (ref 0–1.3)
MONOCYTES NFR BLD AUTO: 9 %
MUCOUS THREADS #/AREA URNS LPF: PRESENT /LPF
NEUTROPHILS # BLD AUTO: 3.6 10E3/UL (ref 1.6–8.3)
NEUTROPHILS NFR BLD AUTO: 64 %
NITRATE UR QL: NEGATIVE
NRBC # BLD AUTO: 0 10E3/UL
NRBC BLD AUTO-RTO: 0 /100
PH UR STRIP: 7 [PH] (ref 5–7)
PHOSPHATE SERPL-MCNC: 2.7 MG/DL (ref 2.5–4.5)
PLATELET # BLD AUTO: 114 10E3/UL (ref 150–450)
POTASSIUM BLD-SCNC: 4.1 MMOL/L (ref 3.4–5.3)
PROT SERPL-MCNC: 6 G/DL (ref 6.8–8.8)
RBC # BLD AUTO: 3.38 10E6/UL (ref 4.4–5.9)
RBC URINE: 0 /HPF
SODIUM SERPL-SCNC: 143 MMOL/L (ref 133–144)
SP GR UR STRIP: 1.01 (ref 1–1.03)
UROBILINOGEN UR STRIP-MCNC: NORMAL MG/DL
WBC # BLD AUTO: 5.6 10E3/UL (ref 4–11)
WBC URINE: 0 /HPF

## 2021-07-29 PROCEDURE — 99001 SPECIMEN HANDLING PT-LAB: CPT

## 2021-07-29 PROCEDURE — 81001 URINALYSIS AUTO W/SCOPE: CPT

## 2021-07-29 PROCEDURE — 82040 ASSAY OF SERUM ALBUMIN: CPT

## 2021-07-29 PROCEDURE — 93005 ELECTROCARDIOGRAM TRACING: CPT

## 2021-07-29 PROCEDURE — 93010 ELECTROCARDIOGRAM REPORT: CPT | Performed by: INTERNAL MEDICINE

## 2021-07-29 PROCEDURE — 99214 OFFICE O/P EST MOD 30 MIN: CPT | Mod: GC

## 2021-07-29 PROCEDURE — 82248 BILIRUBIN DIRECT: CPT

## 2021-07-29 PROCEDURE — G0463 HOSPITAL OUTPT CLINIC VISIT: HCPCS

## 2021-07-29 PROCEDURE — 85025 COMPLETE CBC W/AUTO DIFF WBC: CPT

## 2021-07-29 PROCEDURE — 83735 ASSAY OF MAGNESIUM: CPT

## 2021-07-29 PROCEDURE — 84100 ASSAY OF PHOSPHORUS: CPT

## 2021-07-29 PROCEDURE — 250N000011 HC RX IP 250 OP 636

## 2021-07-29 PROCEDURE — 83615 LACTATE (LD) (LDH) ENZYME: CPT

## 2021-07-29 RX ORDER — ACYCLOVIR 800 MG/1
400 TABLET ORAL EVERY 12 HOURS
Qty: 60 TABLET | Refills: 3 | Status: SHIPPED | OUTPATIENT
Start: 2021-07-29 | End: 2021-08-26

## 2021-07-29 RX ORDER — HEPARIN SODIUM,PORCINE 10 UNIT/ML
5 VIAL (ML) INTRAVENOUS ONCE
Status: COMPLETED | OUTPATIENT
Start: 2021-07-29 | End: 2021-07-29

## 2021-07-29 RX ADMIN — Medication 5 ML: at 09:52

## 2021-07-29 ASSESSMENT — MIFFLIN-ST. JEOR: SCORE: 1855.11

## 2021-07-29 ASSESSMENT — PAIN SCALES - GENERAL: PAINLEVEL: NO PAIN (0)

## 2021-07-29 NOTE — LETTER
"    7/29/2021         RE: Juvenal Blake  [address] [place] [address]        Dear Colleague,    Thank you for referring your patient, Juvenal Blake, to the [facility] [place]. Please see a copy of my visit note below.    BMT Daily Progress  Note      Juvenal Balke is a 55 year old male PMH significant for refractory NHL, undergoing  NK infusion. Admitted with rituxan (biosimilar) infusion reaction 6/24 and remained admitted through completion of lymphodepleting chemo and  NK cell infusion.  Currently day +31 after initiation cycle 1 of      HPI:   Juvenal returns feeling fairly well, much better compared to prior therapy.      His abdomen remains mildly distended with some tenderness, especially with going over speed bumps, but generally continues to be better that prior to chemotherapy. His activity level is increased compared to prior; he is working in the garden, mowing the lawn and doing things around the house easily. He continues to have a good appetite.    His dyspnea on exertion is stable. He saw cardiology yesterday, and they plan to complete a CT coronary for evaluation of coronary artery disease.     ROS:  14 point ROS negative except as above.      Physical Exam:   /81 (BP Location: Left arm, Patient Position: Sitting, Cuff Size: Adult Regular)   Pulse 74   Temp 97.7  F (36.5  C) (Oral)   Resp 16   Ht 1.715 m (5' 7.52\")   Wt 106.3 kg (234 lb 6.4 oz)   SpO2 100%   BMI 36.15 kg/m  - reviewed in flowsheets, WNL    Wt Readings from Last 5 Encounters:   07/19/21 102.6 kg (226 lb 3.2 oz)   07/12/21 101.9 kg (224 lb 11.2 oz)   07/12/21 103 kg (227 lb)   07/08/21 101.6 kg (224 lb)   07/06/21 102.6 kg (226 lb 4.8 oz)     General: Looks well, sitting in the chair, in NAD  Eyes: PERRL  Lungs: CTAB, no wheezing  Cardiovascular: Normal rate, regular, no m/r/g  Abdominal/Rectal: mild diffuse tenderness to deep palpation, but mostly " non-tender. Remains distended.    Lymphatics: no edema, wearing compression stockings.   Skin: no rashes  Neuro: Alert to conversation. Answering questions appropriately  Additional Findings: Portacath c/d/i    ICANS: 10/10 - pt sentence log is in research folder in the [facility]       ECOG 1    Labs:  Lab Results   Component Value Date    WBC 6.2 07/19/2021    ANEU 0.5 (L) 07/08/2021    HGB 9.5 (L) 07/19/2021    HCT 30.2 (L) 07/19/2021     (L) 07/19/2021     07/19/2021    POTASSIUM 4.0 07/19/2021    CHLORIDE 109 07/19/2021    CO2 27 07/19/2021     (H) 07/19/2021    BUN 12 07/19/2021    CR 0.94 07/19/2021    MAG 2.1 07/19/2021    INR 1.07 06/28/2021    BILITOTAL 0.5 07/19/2021    AST 18 07/19/2021    ALT 33 07/19/2021    ALKPHOS 126 07/19/2021    PROTTOTAL 5.9 (L) 07/19/2021    ALBUMIN 3.3 (L) 07/19/2021       I have assessed all abnormal lab values for their clinical significance and any values considered clinically significant have been addressed in the assessment and plan.         Assessment and Plan:   Juvenal Blake is a 55 year old male PMH significant for refractory NHL, undergoing  NK infusion. Admitted with rituxan (biosimilar) infusion reaction 6/24 and remained admitted through completion of lymphodepleting chemo and  NK cell infusion.  Currently day +31      #Follicular Lymphoma:  S/p  therapy, cycle 1 ( infusion 6/28, 7/6, and 7/12) w/Flu/Cy/Rituxan LD chemo (Rituxan infusion abbreviated d/t severe reaction- see below).    Response : Partial remission     Bone marrow biopsy from 7/15 showed small abnormal lymphoid aggregates,   PET showed markedly improved lymphadenopathy.    - Okay to stop allopurinol for now (will restart with second cycle)  -Plan Cycle 2 of  - full dose LD chemotherapy, No Rituximab. No W/u needed. All outpatient is acceptable.     #Rituxan (biosimilar) infusion reaction 6/24  Previously tolerated Rituxan many times, but intolerance to  "biosimilar. Received 2 doses epinephrine, albuterol, and methylpred. Required up to 6L of oxygen. Now resolved.       #HEME: Keep hgb >7g/dL, plt>10,000  Not needing transfusion.  - anemic due to recent chemo/disease, but overall stable/improved  - WBC recovered after LD chemo and NK infusion      #CV: possible \"Likely Type II MI in setting of demand ischemia from tachycardia, acute on chronic anemia, acute on chronic illness.\" during rituximab reaction.     plan to complete Coronary CT and then consider high intensity satin and ASA 81mg pending results.  Plan to complete cardiac work-up prior to 2nd cycle.      #:   Initial radical prostatectomy and bilateral lymph node dissection. November 15, 2017. Prostatic adenocarcinoma Maricel 4+3 = 7 with tertiary pattern 5. Tumor involves 45% of total surface area. Positive for extensive extraprostatic extension. Positive for bilateral seminal vesicle invasion. Multiple margins positive. Lymphovascular invasion not identified. Perineural invasion was noted. Lymph nodes negative. 3 were examined (2 right obturator and 1 left obturator).    Completed radiation to the prostate fossa and pelvic lymph nodes at [facility] early May 2018. He received 4500 cGy in 25 fractions. He then had 2340 cGy boost in 13 fractions to the prostatic fossa, for a total dose of 6240 cGy in 38 treatment fractions delivered over 54 days. He did not receive hormonal therapy:       #ID:   - prophy: ACV BID - will reduce dose to 400mg BID  - No levofloxacin given resolution of neutropenia  - C diff colitis: finished vancomycin 6/23, treated for 7 days. Ppx bid vanco while neutropenic, now off.      #FEN/Renal:   - Cr and lytes WNL      # GI:  Fullness to left groin: Now resolved  Abdominal Pain secondary to cancer: oxycodone prn.    Transaminitis: suspect related to cancer. Improving.         RTC: NAYA visit in 7-10 days to ensure continued progress towards cycle 2. Will subsequently follow-up " once additional plan is in place for next cycle.    Patient seen and staffed with Dr. Terry.    Cristian Leon MD PhD  Heme/Onc/Transplant Fellow  Pgr #[id number]    Today, I  saw and examined the patient independently in clinic and discussed the recommendations. I reviewed the case with the fellow and agree with the note as above.   I revised the note - patient attained excellent ME !! Plan for C2 after cardiac eval is completed. Pt is in agreement.       Rosa Terry MD  Professor of Medicine          Again, thank you for allowing me to participate in the care of your patient.        Sincerely,        Rosa Terry MD

## 2021-07-29 NOTE — NURSING NOTE
"Oncology Rooming Note    July 29, 2021 10:19 AM   Juvenal Blake is a 55 year old male who presents for:    Chief Complaint   Patient presents with     Blood Draw     Labs drawn via picc by rn in lab. VS taken.     RECHECK     Return: Follicular lymphoma grade i, lymph nodes of multiple sites      Initial Vitals: /81 (BP Location: Left arm, Patient Position: Sitting, Cuff Size: Adult Regular)   Pulse 74   Temp 97.7  F (36.5  C) (Oral)   Resp 16   Ht 1.715 m (5' 7.52\")   Wt 106.3 kg (234 lb 6.4 oz)   SpO2 100%   BMI 36.15 kg/m   Estimated body mass index is 36.15 kg/m  as calculated from the following:    Height as of this encounter: 1.715 m (5' 7.52\").    Weight as of this encounter: 106.3 kg (234 lb 6.4 oz). Body surface area is 2.25 meters squared.  No Pain (0) Comment: Data Unavailable   No LMP for male patient.  Allergies reviewed: Yes  Medications reviewed: Yes    Medications: Medication refills not needed today.  Pharmacy name entered into [employer]: [id number] - [place], [place] - [address] AT [facility]    Clinical concerns: N/A       Shannen Hannon CMA              "

## 2021-07-29 NOTE — PROGRESS NOTES
"BMT Daily Progress  Note      Juvenal Blake is a 55 year old male PMH significant for refractory NHL, undergoing  NK infusion. Admitted with rituxan (biosimilar) infusion reaction 6/24 and remained admitted through completion of lymphodepleting chemo and  NK cell infusion.  Currently day +31 after initiation cycle 1 of      HPI:   Juvenal returns feeling fairly well, much better compared to prior therapy.      His abdomen remains mildly distended with some tenderness, especially with going over speed bumps, but generally continues to be better that prior to chemotherapy. His activity level is increased compared to prior; he is working in the garden, mowing the lawn and doing things around the house easily. He continues to have a good appetite.    His dyspnea on exertion is stable. He saw cardiology yesterday, and they plan to complete a CT coronary for evaluation of coronary artery disease.     ROS:  14 point ROS negative except as above.      Physical Exam:   /81 (BP Location: Left arm, Patient Position: Sitting, Cuff Size: Adult Regular)   Pulse 74   Temp 97.7  F (36.5  C) (Oral)   Resp 16   Ht 1.715 m (5' 7.52\")   Wt 106.3 kg (234 lb 6.4 oz)   SpO2 100%   BMI 36.15 kg/m  - reviewed in flowsheets, WNL    Wt Readings from Last 5 Encounters:   07/19/21 102.6 kg (226 lb 3.2 oz)   07/12/21 101.9 kg (224 lb 11.2 oz)   07/12/21 103 kg (227 lb)   07/08/21 101.6 kg (224 lb)   07/06/21 102.6 kg (226 lb 4.8 oz)     General: Looks well, sitting in the chair, in NAD  Eyes: PERRL  Lungs: CTAB, no wheezing  Cardiovascular: Normal rate, regular, no m/r/g  Abdominal/Rectal: mild diffuse tenderness to deep palpation, but mostly non-tender. Remains distended.    Lymphatics: no edema, wearing compression stockings.   Skin: no rashes  Neuro: Alert to conversation. Answering questions appropriately  Additional Findings: Portacath c/d/i    ICANS: 10/10 - pt sentence log is in research folder in the [facility]  "      ECOG 1    Labs:  Lab Results   Component Value Date    WBC 6.2 07/19/2021    ANEU 0.5 (L) 07/08/2021    HGB 9.5 (L) 07/19/2021    HCT 30.2 (L) 07/19/2021     (L) 07/19/2021     07/19/2021    POTASSIUM 4.0 07/19/2021    CHLORIDE 109 07/19/2021    CO2 27 07/19/2021     (H) 07/19/2021    BUN 12 07/19/2021    CR 0.94 07/19/2021    MAG 2.1 07/19/2021    INR 1.07 06/28/2021    BILITOTAL 0.5 07/19/2021    AST 18 07/19/2021    ALT 33 07/19/2021    ALKPHOS 126 07/19/2021    PROTTOTAL 5.9 (L) 07/19/2021    ALBUMIN 3.3 (L) 07/19/2021       I have assessed all abnormal lab values for their clinical significance and any values considered clinically significant have been addressed in the assessment and plan.         Assessment and Plan:   Juvenal Blake is a 55 year old male PMH significant for refractory NHL, undergoing  NK infusion. Admitted with rituxan (biosimilar) infusion reaction 6/24 and remained admitted through completion of lymphodepleting chemo and  NK cell infusion.  Currently day +31      #Follicular Lymphoma:  S/p  therapy, cycle 1 ( infusion 6/28, 7/6, and 7/12) w/Flu/Cy/Rituxan LD chemo (Rituxan infusion abbreviated d/t severe reaction- see below).    Response : Partial remission     Bone marrow biopsy from 7/15 showed small abnormal lymphoid aggregates,   PET showed markedly improved lymphadenopathy.    - Okay to stop allopurinol for now (will restart with second cycle)  -Plan Cycle 2 of  - full dose LD chemotherapy, No Rituximab. No W/u needed. All outpatient is acceptable.     #Rituxan (biosimilar) infusion reaction 6/24  Previously tolerated Rituxan many times, but intolerance to biosimilar. Received 2 doses epinephrine, albuterol, and methylpred. Required up to 6L of oxygen. Now resolved.       #HEME: Keep hgb >7g/dL, plt>10,000  Not needing transfusion.  - anemic due to recent chemo/disease, but overall stable/improved  - WBC recovered after LD chemo and NK  "infusion      #CV: possible \"Likely Type II MI in setting of demand ischemia from tachycardia, acute on chronic anemia, acute on chronic illness.\" during rituximab reaction.     plan to complete Coronary CT and then consider high intensity satin and ASA 81mg pending results.  Plan to complete cardiac work-up prior to 2nd cycle.      #:   Initial radical prostatectomy and bilateral lymph node dissection. November 15, 2017. Prostatic adenocarcinoma Maricel 4+3 = 7 with tertiary pattern 5. Tumor involves 45% of total surface area. Positive for extensive extraprostatic extension. Positive for bilateral seminal vesicle invasion. Multiple margins positive. Lymphovascular invasion not identified. Perineural invasion was noted. Lymph nodes negative. 3 were examined (2 right obturator and 1 left obturator).    Completed radiation to the prostate fossa and pelvic lymph nodes at [facility] early May 2018. He received 4500 cGy in 25 fractions. He then had 2340 cGy boost in 13 fractions to the prostatic fossa, for a total dose of 6240 cGy in 38 treatment fractions delivered over 54 days. He did not receive hormonal therapy:       #ID:   - prophy: ACV BID - will reduce dose to 400mg BID  - No levofloxacin given resolution of neutropenia  - C diff colitis: finished vancomycin 6/23, treated for 7 days. Ppx bid vanco while neutropenic, now off.      #FEN/Renal:   - Cr and lytes WNL      # GI:  Fullness to left groin: Now resolved  Abdominal Pain secondary to cancer: oxycodone prn.    Transaminitis: suspect related to cancer. Improving.         RTC: NAYA visit in 7-10 days to ensure continued progress towards cycle 2. Will subsequently follow-up once additional plan is in place for next cycle.    Patient seen and staffed with Dr. Terry.    Cristian Leon MD PhD  Heme/Onc/Transplant Fellow  Pgr #[id number]    Today, I  saw and examined the patient independently in clinic and discussed the recommendations. I reviewed " the case with the fellow and agree with the note as above.   I revised the note - patient attained excellent KS !! Plan for C2 after cardiac eval is completed. Pt is in agreement.       Rosa Terry MD  Professor of Medicine

## 2021-07-29 NOTE — NURSING NOTE
EKG was performed today per order written by Dr. Rosa Terry.  Name and  verified with patient.    Tyler Morales MA

## 2021-07-29 NOTE — NURSING NOTE
Chief Complaint   Patient presents with     Blood Draw     Labs drawn via picc by rn in lab. VS taken.     Labs collected from PICC. Caps changed,  Line flushed with saline and heparin locked.  Vitals taken and pt checked in for appt.    Cole Valdez RN

## 2021-08-02 DIAGNOSIS — C82.08 FOLLICULAR LYMPHOMA GRADE I, LYMPH NODES OF MULTIPLE SITES (H): Primary | ICD-10-CM

## 2021-08-02 LAB
ATRIAL RATE - MUSE: 63 BPM
DIASTOLIC BLOOD PRESSURE - MUSE: NORMAL MMHG
INTERPRETATION ECG - MUSE: NORMAL
P AXIS - MUSE: 64 DEGREES
PR INTERVAL - MUSE: 156 MS
QRS DURATION - MUSE: 102 MS
QT - MUSE: 418 MS
QTC - MUSE: 427 MS
R AXIS - MUSE: -57 DEGREES
SYSTOLIC BLOOD PRESSURE - MUSE: NORMAL MMHG
T AXIS - MUSE: 32 DEGREES
VENTRICULAR RATE- MUSE: 63 BPM

## 2021-08-02 NOTE — PROGRESS NOTES
[id number]   Subject name: Juvenal Blake     This writer called Juvenal with an update on his plan of care. Per Dr. Terry, patient will proceed with cycle 2 of  without Rituxan pending CT angio to be completed on 8/13. Protocol dictates that patient is seen weekly on days 36, 43, 50 (weekly, in his case on Tuesdays) until he proceeds with cycle 2. He will require labs, provider visit, ECOG, and ICANS assessments at these visits. Juvenal is aware of plan, scheduling was notified to make appointments for 8/3, 8/10, 8/17. Protocol driven lab orders were placed for 8/3. All questions were answered.     Emilia Castillo RN

## 2021-08-02 NOTE — PROGRESS NOTES
BMT Daily Progress  Note      Juvenal Blake is a 55 year old male PMH significant for refractory NHL, undergoing  NK infusion. Admitted with rituxan (biosimilar) infusion reaction 6/24 and remained admitted through completion of lymphodepleting chemo and  NK cell infusion.  Currently day +36 after initiation cycle 1 of      HPI: Doing ok. Here for weekly labs and neurotixicity evaluation. A few episodes of diarrhea the last couple days and some abdominal tenderness. No fevers. No nausea. Eating and drinking ok. Chipped off a front tooth while eating a sandwich. Needs to have dental work done. Denies any current tooth pain at the site. Denies chest pain or dyspnea. Has imaging scheduled for 8/13    ROS:  14 point ROS negative except as above.      Physical Exam:   /75   Pulse 66   Temp 98.1  F (36.7  C) (Oral)   Resp 16   Wt 107 kg (236 lb)   SpO2 99%   BMI 36.40 kg/m      Wt Readings from Last 5 Encounters:   08/03/21 107 kg (236 lb)   07/29/21 106.3 kg (234 lb 6.4 oz)   07/19/21 102.6 kg (226 lb 3.2 oz)   07/12/21 101.9 kg (224 lb 11.2 oz)   07/12/21 103 kg (227 lb)     General: Looks well, sitting in the chair, in NAD  Eyes: sclera anicteric  Mouth: R front tooth chipped down to base with only minimal visible tooth at gumline. No surrounding infection  Lungs: CTAB, no wheezing  Cardiovascular: Normal rate, regular, no m/r/g  Abdominal/Rectal: normoactive BS, mild diffuse tenderness to deep palpation, but mostly non-tender. Remains distended.    Lymphatics: no edema, wearing compression stockings.   Skin: no rashes  Neuro: Alert to conversation. Answering questions appropriately  Additional Findings: Portacath c/d/i    ICANS: 10/10 - pt sentence log is in research folder in the [facility]       ECOG 1    Labs:  Lab Results   Component Value Date    WBC 4.8 08/03/2021    ANEU 0.5 (L) 07/08/2021    HGB 10.8 (L) 08/03/2021    HCT 33.3 (L) 08/03/2021     (L) 08/03/2021     08/03/2021     POTASSIUM 3.8 08/03/2021    CHLORIDE 109 08/03/2021    CO2 28 08/03/2021     (H) 08/03/2021    BUN 11 08/03/2021    CR 0.94 08/03/2021    MAG 1.9 08/03/2021    INR 1.07 06/28/2021    BILITOTAL 0.5 08/03/2021    AST 21 08/03/2021    ALT 41 08/03/2021    ALKPHOS 81 08/03/2021    PROTTOTAL 5.9 (L) 08/03/2021    ALBUMIN 3.5 08/03/2021       I have assessed all abnormal lab values for their clinical significance and any values considered clinically significant have been addressed in the assessment and plan.         Assessment and Plan:   Juvenal Blake is a 55 year old male PMH significant for refractory NHL, undergoing  NK infusion. Admitted with rituxan (biosimilar) infusion reaction 6/24 and remained admitted through completion of lymphodepleting chemo and  NK cell infusion.  Currently day +36    #Follicular Lymphoma:  See previous notes regarding prior therapy. Previously recieved Haplo NAM-NK therapy 9/2020 and idelalisib 10/2020 through 6/2021.  Now on  therapy, cycle 1 ( infusion 6/28, 7/6, and 7/12) w/Flu/Cy/Rituxan LD chemo (Rituxan infusion abbreviated d/t severe reaction- see below). Bone marrow biopsy from 7/15 showed small abnormal lymphoid aggregates, seemingly consistent with treated Follicular lymphoma and PET showed markedly improved lymphadenopathy. Formal RECIST is pending, per clinical trial. Overall, suspect this is a nice MT from the , and would advocate for second cycle. Will discuss with trial sponsor about further cycle, pending cardiac evaluation.   - Okay to stop allopurinol for now (will restart with second cycle)    #Rituxan (biosimilar) reaction 6/24  Previously tolerated Rituxan many times, but intolerance to biosimilar. Received 2 doses epinephrine, albuterol, and methylpred. Required up to 6L of oxygen. Now resolved. Had      #HEME: Keep hgb >7g/dL, plt>10,000  - anemic due to recent chemo/disease, but overall stable/improved  - WBC recovered after LD  "chemo and NK infusion      #CV:   Cardiology consulted for SOB, elevated troponin during hospital admission 6/24. Per their note: \"Likely Type II MI in setting of demand ischemia from tachycardia, acute on chronic anemia, acute on chronic illness. No evidence of heart failure at this time.\" Subsequently followed-up outpatient on 7/28/21, with agreement with inpatient evaluation. They plan to complete Coronary CT and then consider high intensity satin and ASA 81mg pending results. Will continue to monitor the outcome of these results for further work-up.    #:   Initial radical prostatectomy and bilateral lymph node dissection. November 15, 2017. Prostatic adenocarcinoma [place] 4+3 = 7 with tertiary pattern 5. Tumor involves 45% of total surface area. Positive for extensive extraprostatic extension. Positive for bilateral seminal vesicle invasion. Multiple margins positive. Lymphovascular invasion not identified. Perineural invasion was noted. Lymph nodes negative. 3 were examined (2 right obturator and 1 left obturator).    Completed radiation to the prostate fossa and pelvic lymph nodes at [facility] early May 2018. He received 4500 cGy in 25 fractions. He then had 2340 cGy boost in 13 fractions to the prostatic fossa, for a total dose of 6240 cGy in 38 treatment fractions delivered over 54 days. He did not receive hormonal therapy:       #ID:   - prophy: ACV BID - will reduce dose to 400mg BID  - No levofloxacin given resolution of neutropenia  - C diff colitis: finished vancomycin 6/23, treated for 7 days. Ppx bid vanco while neutropenic, now off.      #FEN/Renal:   - Cr and lytes WNL      # GI:  Fullness to left groin: Now resolved  Abdominal Pain secondary to cancer: oxycodone prn.    Transaminitis: suspect related to cancer. Improving.     # dental  - R tooth with severe chip, ok to pursue dental work now, if we proceed with next cycle and counts drop would be at higher risk for infection/bleeding " if he waits. Amoxicillin 2g prior due to port - sent scrip to [facility]     RTC: 8/10 for weekly labs + neurotox screening for FATE  - 8/13 CT angio - will complete this eval before deciding whether or not to pursue second cycle of FATE    Review of external notes as documented elsewhere in note  30 minutes spent on the date of the encounter doing chart review, history and exam, documentation and further activities per the note        Irma Bethea PA-C  [phone]

## 2021-08-03 ENCOUNTER — ONCOLOGY VISIT (OUTPATIENT)
Dept: TRANSPLANT | Facility: CLINIC | Age: 56
End: 2021-08-03
Attending: PHYSICIAN ASSISTANT
Payer: COMMERCIAL

## 2021-08-03 ENCOUNTER — APPOINTMENT (OUTPATIENT)
Dept: LAB | Facility: CLINIC | Age: 56
End: 2021-08-03
Attending: PHYSICIAN ASSISTANT
Payer: COMMERCIAL

## 2021-08-03 VITALS
WEIGHT: 236 LBS | SYSTOLIC BLOOD PRESSURE: 124 MMHG | DIASTOLIC BLOOD PRESSURE: 75 MMHG | TEMPERATURE: 98.1 F | OXYGEN SATURATION: 99 % | HEART RATE: 66 BPM | BODY MASS INDEX: 36.4 KG/M2 | RESPIRATION RATE: 16 BRPM

## 2021-08-03 DIAGNOSIS — C82.08 FOLLICULAR LYMPHOMA GRADE I, LYMPH NODES OF MULTIPLE SITES (H): ICD-10-CM

## 2021-08-03 LAB
ALBUMIN SERPL-MCNC: 3.5 G/DL (ref 3.4–5)
ALP SERPL-CCNC: 81 U/L (ref 40–150)
ALT SERPL W P-5'-P-CCNC: 41 U/L (ref 0–70)
ANION GAP SERPL CALCULATED.3IONS-SCNC: 5 MMOL/L (ref 3–14)
AST SERPL W P-5'-P-CCNC: 21 U/L (ref 0–45)
BASOPHILS # BLD AUTO: 0 10E3/UL (ref 0–0.2)
BASOPHILS NFR BLD AUTO: 0 %
BILIRUB DIRECT SERPL-MCNC: 0.2 MG/DL (ref 0–0.2)
BILIRUB SERPL-MCNC: 0.5 MG/DL (ref 0.2–1.3)
BUN SERPL-MCNC: 11 MG/DL (ref 7–30)
CALCIUM SERPL-MCNC: 8.9 MG/DL (ref 8.5–10.1)
CHLORIDE BLD-SCNC: 109 MMOL/L (ref 94–109)
CO2 SERPL-SCNC: 28 MMOL/L (ref 20–32)
CREAT SERPL-MCNC: 0.94 MG/DL (ref 0.66–1.25)
EOSINOPHIL # BLD AUTO: 0.3 10E3/UL (ref 0–0.7)
EOSINOPHIL NFR BLD AUTO: 7 %
ERYTHROCYTE [DISTWIDTH] IN BLOOD BY AUTOMATED COUNT: 18.2 % (ref 10–15)
GFR SERPL CREATININE-BSD FRML MDRD: 90 ML/MIN/1.73M2
GLUCOSE BLD-MCNC: 134 MG/DL (ref 70–99)
HCT VFR BLD AUTO: 33.3 % (ref 40–53)
HGB BLD-MCNC: 10.8 G/DL (ref 13.3–17.7)
IMM GRANULOCYTES # BLD: 0 10E3/UL
IMM GRANULOCYTES NFR BLD: 0 %
LDH SERPL L TO P-CCNC: 146 U/L (ref 85–227)
LYMPHOCYTES # BLD AUTO: 0.9 10E3/UL (ref 0.8–5.3)
LYMPHOCYTES NFR BLD AUTO: 18 %
MAGNESIUM SERPL-MCNC: 1.9 MG/DL (ref 1.6–2.3)
MCH RBC QN AUTO: 32 PG (ref 26.5–33)
MCHC RBC AUTO-ENTMCNC: 32.4 G/DL (ref 31.5–36.5)
MCV RBC AUTO: 99 FL (ref 78–100)
MONOCYTES # BLD AUTO: 0.4 10E3/UL (ref 0–1.3)
MONOCYTES NFR BLD AUTO: 9 %
NEUTROPHILS # BLD AUTO: 3.2 10E3/UL (ref 1.6–8.3)
NEUTROPHILS NFR BLD AUTO: 66 %
NRBC # BLD AUTO: 0 10E3/UL
NRBC BLD AUTO-RTO: 0 /100
PHOSPHATE SERPL-MCNC: 3.2 MG/DL (ref 2.5–4.5)
PLATELET # BLD AUTO: 100 10E3/UL (ref 150–450)
POTASSIUM BLD-SCNC: 3.8 MMOL/L (ref 3.4–5.3)
PROT SERPL-MCNC: 5.9 G/DL (ref 6.8–8.8)
RBC # BLD AUTO: 3.38 10E6/UL (ref 4.4–5.9)
SODIUM SERPL-SCNC: 142 MMOL/L (ref 133–144)
WBC # BLD AUTO: 4.8 10E3/UL (ref 4–11)

## 2021-08-03 PROCEDURE — 82040 ASSAY OF SERUM ALBUMIN: CPT

## 2021-08-03 PROCEDURE — G0463 HOSPITAL OUTPT CLINIC VISIT: HCPCS

## 2021-08-03 PROCEDURE — 83615 LACTATE (LD) (LDH) ENZYME: CPT

## 2021-08-03 PROCEDURE — 99214 OFFICE O/P EST MOD 30 MIN: CPT

## 2021-08-03 PROCEDURE — 82248 BILIRUBIN DIRECT: CPT

## 2021-08-03 PROCEDURE — 85025 COMPLETE CBC W/AUTO DIFF WBC: CPT

## 2021-08-03 PROCEDURE — 83735 ASSAY OF MAGNESIUM: CPT

## 2021-08-03 PROCEDURE — 250N000011 HC RX IP 250 OP 636: Performed by: PHYSICIAN ASSISTANT

## 2021-08-03 PROCEDURE — 36592 COLLECT BLOOD FROM PICC: CPT

## 2021-08-03 PROCEDURE — 84100 ASSAY OF PHOSPHORUS: CPT

## 2021-08-03 RX ORDER — HEPARIN SODIUM,PORCINE 10 UNIT/ML
5 VIAL (ML) INTRAVENOUS ONCE
Status: COMPLETED | OUTPATIENT
Start: 2021-08-03 | End: 2021-08-03

## 2021-08-03 RX ORDER — AMOXICILLIN 500 MG/1
2000 TABLET, FILM COATED ORAL ONCE
Qty: 4 TABLET | Refills: 0 | Status: SHIPPED | OUTPATIENT
Start: 2021-08-03 | End: 2021-08-03

## 2021-08-03 RX ADMIN — Medication 5 ML: at 08:48

## 2021-08-03 RX ADMIN — Medication 5 ML: at 08:49

## 2021-08-03 ASSESSMENT — PAIN SCALES - GENERAL: PAINLEVEL: NO PAIN (1)

## 2021-08-03 NOTE — LETTER
8/3/2021         RE: Juvenal Blake  [address] [place] [address]        Dear Colleague,    Thank you for referring your patient, Juvenal Blake, to the [facility] [place]. Please see a copy of my visit note below.    BMT Daily Progress  Note      Juvenal Blake is a 55 year old male PMH significant for refractory NHL, undergoing  NK infusion. Admitted with rituxan (biosimilar) infusion reaction 6/24 and remained admitted through completion of lymphodepleting chemo and  NK cell infusion.  Currently day +36 after initiation cycle 1 of      HPI: Doing ok. Here for weekly labs and neurotixicity evaluation. A few episodes of diarrhea the last couple days and some abdominal tenderness. No fevers. No nausea. Eating and drinking ok. Chipped off a front tooth while eating a sandwich. Needs to have dental work done. Denies any current tooth pain at the site. Denies chest pain or dyspnea. Has imaging scheduled for 8/13    ROS:  14 point ROS negative except as above.      Physical Exam:   /75   Pulse 66   Temp 98.1  F (36.7  C) (Oral)   Resp 16   Wt 107 kg (236 lb)   SpO2 99%   BMI 36.40 kg/m      Wt Readings from Last 5 Encounters:   08/03/21 107 kg (236 lb)   07/29/21 106.3 kg (234 lb 6.4 oz)   07/19/21 102.6 kg (226 lb 3.2 oz)   07/12/21 101.9 kg (224 lb 11.2 oz)   07/12/21 103 kg (227 lb)     General: Looks well, sitting in the chair, in NAD  Eyes: sclera anicteric  Mouth: R front tooth chipped down to base with only minimal visible tooth at gumline. No surrounding infection  Lungs: CTAB, no wheezing  Cardiovascular: Normal rate, regular, no m/r/g  Abdominal/Rectal: normoactive BS, mild diffuse tenderness to deep palpation, but mostly non-tender. Remains distended.    Lymphatics: no edema, wearing compression stockings.   Skin: no rashes  Neuro: Alert to conversation. Answering questions appropriately  Additional Findings: Portacath  c/d/i    ICANS: 10/10 - pt sentence log is in research folder in the [facility]       ECOG 1    Labs:  Lab Results   Component Value Date    WBC 4.8 08/03/2021    ANEU 0.5 (L) 07/08/2021    HGB 10.8 (L) 08/03/2021    HCT 33.3 (L) 08/03/2021     (L) 08/03/2021     08/03/2021    POTASSIUM 3.8 08/03/2021    CHLORIDE 109 08/03/2021    CO2 28 08/03/2021     (H) 08/03/2021    BUN 11 08/03/2021    CR 0.94 08/03/2021    MAG 1.9 08/03/2021    INR 1.07 06/28/2021    BILITOTAL 0.5 08/03/2021    AST 21 08/03/2021    ALT 41 08/03/2021    ALKPHOS 81 08/03/2021    PROTTOTAL 5.9 (L) 08/03/2021    ALBUMIN 3.5 08/03/2021       I have assessed all abnormal lab values for their clinical significance and any values considered clinically significant have been addressed in the assessment and plan.         Assessment and Plan:   Juvenal Blake is a 55 year old male PMH significant for refractory NHL, undergoing  NK infusion. Admitted with rituxan (biosimilar) infusion reaction 6/24 and remained admitted through completion of lymphodepleting chemo and  NK cell infusion.  Currently day +36    #Follicular Lymphoma:  See previous notes regarding prior therapy. Previously recieved Haplo NAM-NK therapy 9/2020 and idelalisib 10/2020 through 6/2021.  Now on  therapy, cycle 1 ( infusion 6/28, 7/6, and 7/12) w/Flu/Cy/Rituxan LD chemo (Rituxan infusion abbreviated d/t severe reaction- see below). Bone marrow biopsy from 7/15 showed small abnormal lymphoid aggregates, seemingly consistent with treated Follicular lymphoma and PET showed markedly improved lymphadenopathy. Formal RECIST is pending, per clinical trial. Overall, suspect this is a nice IA from the , and would advocate for second cycle. Will discuss with trial sponsor about further cycle, pending cardiac evaluation.   - Okay to stop allopurinol for now (will restart with second cycle)    #Rituxan (biosimilar) reaction 6/24  Previously tolerated Rituxan  "many times, but intolerance to biosimilar. Received 2 doses epinephrine, albuterol, and methylpred. Required up to 6L of oxygen. Now resolved. Had      #HEME: Keep hgb >7g/dL, plt>10,000  - anemic due to recent chemo/disease, but overall stable/improved  - WBC recovered after LD chemo and NK infusion      #CV:   Cardiology consulted for SOB, elevated troponin during hospital admission 6/24. Per their note: \"Likely Type II MI in setting of demand ischemia from tachycardia, acute on chronic anemia, acute on chronic illness. No evidence of heart failure at this time.\" Subsequently followed-up outpatient on 7/28/21, with agreement with inpatient evaluation. They plan to complete Coronary CT and then consider high intensity satin and ASA 81mg pending results. Will continue to monitor the outcome of these results for further work-up.    #:   Initial radical prostatectomy and bilateral lymph node dissection. November 15, 2017. Prostatic adenocarcinoma [place] 4+3 = 7 with tertiary pattern 5. Tumor involves 45% of total surface area. Positive for extensive extraprostatic extension. Positive for bilateral seminal vesicle invasion. Multiple margins positive. Lymphovascular invasion not identified. Perineural invasion was noted. Lymph nodes negative. 3 were examined (2 right obturator and 1 left obturator).    Completed radiation to the prostate fossa and pelvic lymph nodes at [facility] early May 2018. He received 4500 cGy in 25 fractions. He then had 2340 cGy boost in 13 fractions to the prostatic fossa, for a total dose of 6240 cGy in 38 treatment fractions delivered over 54 days. He did not receive hormonal therapy:       #ID:   - prophy: ACV BID - will reduce dose to 400mg BID  - No levofloxacin given resolution of neutropenia  - C diff colitis: finished vancomycin 6/23, treated for 7 days. Ppx bid vanco while neutropenic, now off.      #FEN/Renal:   - Cr and lytes WNL      # GI:  Fullness to left groin: Now " resolved  Abdominal Pain secondary to cancer: oxycodone prn.    Transaminitis: suspect related to cancer. Improving.     # dental  - R tooth with severe chip, ok to pursue dental work now, if we proceed with next cycle and counts drop would be at higher risk for infection/bleeding if he waits. Amoxicillin 2g prior due to port - sent scrip to [place]     RTC: 8/10 for weekly labs + neurotox screening for FATE  - 8/13 CT angio - will complete this eval before deciding whether or not to pursue second cycle of FATE    Review of external notes as documented elsewhere in note  30 minutes spent on the date of the encounter doing chart review, history and exam, documentation and further activities per the note        Irma Bethea PA-C  [phone]            Again, thank you for allowing me to participate in the care of your patient.        Sincerely,        BMT Advanced Practice Provider

## 2021-08-03 NOTE — NURSING NOTE
"Oncology Rooming Note    August 3, 2021 9:09 AM   Juvenal Blake is a 55 year old male who presents for:    Chief Complaint   Patient presents with     Blood Draw     Labs drawn via PICC by RN. VS taken.     Oncology Clinic Visit     Return; Follicular Lymphoma.      Initial Vitals: /75   Pulse 66   Temp 98.1  F (36.7  C) (Oral)   Resp 16   Wt 107 kg (236 lb)   SpO2 99%   BMI 36.40 kg/m   Estimated body mass index is 36.4 kg/m  as calculated from the following:    Height as of 7/29/21: 1.715 m (5' 7.52\").    Weight as of this encounter: 107 kg (236 lb). Body surface area is 2.26 meters squared.  No Pain (1) Comment: Data Unavailable   No LMP for male patient.  Allergies reviewed: Yes  Medications reviewed: Yes    Medications: Medication refills not needed today.  Pharmacy name entered into [employer]: [id number] - [place], [place] - [address] AT [facility]    Clinical concerns: PT has a concerns about broken tooth.    Gertrude Freedman,   (Student MA)      Patient's rooming completed by Margot Camarillo MA.      All steps completed per rooming protocol.    Maryana Duarte CMA (AAMA)          "

## 2021-08-03 NOTE — NURSING NOTE
Chief Complaint   Patient presents with     Blood Draw     Labs drawn via PICC by RN. VS taken.     Labs drawn from PICC by rn.  Good blood return noted in both lumens.  Both lumens flushed with NS and heparin.  Pt tolerated well.  VS taken.  Pt checked in for next appt.    Vern Trotter RN

## 2021-08-04 DIAGNOSIS — C82.08 FOLLICULAR LYMPHOMA GRADE I, LYMPH NODES OF MULTIPLE SITES (H): Primary | ICD-10-CM

## 2021-08-10 ENCOUNTER — APPOINTMENT (OUTPATIENT)
Dept: LAB | Facility: CLINIC | Age: 56
End: 2021-08-10
Attending: PHYSICIAN ASSISTANT
Payer: COMMERCIAL

## 2021-08-10 ENCOUNTER — ONCOLOGY VISIT (OUTPATIENT)
Dept: TRANSPLANT | Facility: CLINIC | Age: 56
End: 2021-08-10
Attending: PHYSICIAN ASSISTANT
Payer: COMMERCIAL

## 2021-08-10 ENCOUNTER — RESEARCH ENCOUNTER (OUTPATIENT)
Dept: TRANSPLANT | Facility: CLINIC | Age: 56
End: 2021-08-10

## 2021-08-10 VITALS
HEIGHT: 68 IN | SYSTOLIC BLOOD PRESSURE: 131 MMHG | DIASTOLIC BLOOD PRESSURE: 77 MMHG | OXYGEN SATURATION: 99 % | WEIGHT: 234.6 LBS | TEMPERATURE: 97.9 F | BODY MASS INDEX: 35.55 KG/M2 | RESPIRATION RATE: 16 BRPM | HEART RATE: 72 BPM

## 2021-08-10 DIAGNOSIS — C82.08 FOLLICULAR LYMPHOMA GRADE I, LYMPH NODES OF MULTIPLE SITES (H): ICD-10-CM

## 2021-08-10 LAB
ALBUMIN SERPL-MCNC: 3.7 G/DL (ref 3.4–5)
ALP SERPL-CCNC: 96 U/L (ref 40–150)
ALT SERPL W P-5'-P-CCNC: 106 U/L (ref 0–70)
ANION GAP SERPL CALCULATED.3IONS-SCNC: 6 MMOL/L (ref 3–14)
AST SERPL W P-5'-P-CCNC: 47 U/L (ref 0–45)
BASOPHILS # BLD AUTO: 0 10E3/UL (ref 0–0.2)
BASOPHILS NFR BLD AUTO: 0 %
BILIRUB DIRECT SERPL-MCNC: 0.1 MG/DL (ref 0–0.2)
BILIRUB SERPL-MCNC: 0.3 MG/DL (ref 0.2–1.3)
BUN SERPL-MCNC: 12 MG/DL (ref 7–30)
CALCIUM SERPL-MCNC: 9 MG/DL (ref 8.5–10.1)
CHLORIDE BLD-SCNC: 110 MMOL/L (ref 94–109)
CO2 SERPL-SCNC: 25 MMOL/L (ref 20–32)
CREAT SERPL-MCNC: 0.91 MG/DL (ref 0.66–1.25)
EOSINOPHIL # BLD AUTO: 0.3 10E3/UL (ref 0–0.7)
EOSINOPHIL NFR BLD AUTO: 6 %
ERYTHROCYTE [DISTWIDTH] IN BLOOD BY AUTOMATED COUNT: 16.6 % (ref 10–15)
GFR SERPL CREATININE-BSD FRML MDRD: >90 ML/MIN/1.73M2
GLUCOSE BLD-MCNC: 131 MG/DL (ref 70–99)
HCT VFR BLD AUTO: 34.4 % (ref 40–53)
HGB BLD-MCNC: 11.6 G/DL (ref 13.3–17.7)
IMM GRANULOCYTES # BLD: 0 10E3/UL
IMM GRANULOCYTES NFR BLD: 1 %
LDH SERPL L TO P-CCNC: 163 U/L (ref 85–227)
LYMPHOCYTES # BLD AUTO: 0.9 10E3/UL (ref 0.8–5.3)
LYMPHOCYTES NFR BLD AUTO: 18 %
MAGNESIUM SERPL-MCNC: 1.9 MG/DL (ref 1.6–2.3)
MCH RBC QN AUTO: 32.7 PG (ref 26.5–33)
MCHC RBC AUTO-ENTMCNC: 33.7 G/DL (ref 31.5–36.5)
MCV RBC AUTO: 97 FL (ref 78–100)
MONOCYTES # BLD AUTO: 0.4 10E3/UL (ref 0–1.3)
MONOCYTES NFR BLD AUTO: 9 %
NEUTROPHILS # BLD AUTO: 3.1 10E3/UL (ref 1.6–8.3)
NEUTROPHILS NFR BLD AUTO: 66 %
NRBC # BLD AUTO: 0 10E3/UL
NRBC BLD AUTO-RTO: 0 /100
PHOSPHATE SERPL-MCNC: 2.4 MG/DL (ref 2.5–4.5)
PLATELET # BLD AUTO: 104 10E3/UL (ref 150–450)
POTASSIUM BLD-SCNC: 4 MMOL/L (ref 3.4–5.3)
PROT SERPL-MCNC: 6 G/DL (ref 6.8–8.8)
RBC # BLD AUTO: 3.55 10E6/UL (ref 4.4–5.9)
SODIUM SERPL-SCNC: 141 MMOL/L (ref 133–144)
URATE SERPL-MCNC: 4 MG/DL (ref 3.5–7.2)
WBC # BLD AUTO: 4.7 10E3/UL (ref 4–11)

## 2021-08-10 PROCEDURE — G0463 HOSPITAL OUTPT CLINIC VISIT: HCPCS

## 2021-08-10 PROCEDURE — 84550 ASSAY OF BLOOD/URIC ACID: CPT

## 2021-08-10 PROCEDURE — 84100 ASSAY OF PHOSPHORUS: CPT

## 2021-08-10 PROCEDURE — 82248 BILIRUBIN DIRECT: CPT

## 2021-08-10 PROCEDURE — 80053 COMPREHEN METABOLIC PANEL: CPT

## 2021-08-10 PROCEDURE — 85025 COMPLETE CBC W/AUTO DIFF WBC: CPT

## 2021-08-10 PROCEDURE — 250N000011 HC RX IP 250 OP 636: Performed by: PHYSICIAN ASSISTANT

## 2021-08-10 PROCEDURE — 83735 ASSAY OF MAGNESIUM: CPT

## 2021-08-10 PROCEDURE — 83615 LACTATE (LD) (LDH) ENZYME: CPT

## 2021-08-10 PROCEDURE — 99214 OFFICE O/P EST MOD 30 MIN: CPT

## 2021-08-10 PROCEDURE — 36592 COLLECT BLOOD FROM PICC: CPT

## 2021-08-10 RX ORDER — HEPARIN SODIUM,PORCINE 10 UNIT/ML
5 VIAL (ML) INTRAVENOUS ONCE
Status: DISCONTINUED | OUTPATIENT
Start: 2021-08-10 | End: 2021-08-10 | Stop reason: HOSPADM

## 2021-08-10 RX ORDER — HEPARIN SODIUM,PORCINE 10 UNIT/ML
5 VIAL (ML) INTRAVENOUS ONCE
Status: COMPLETED | OUTPATIENT
Start: 2021-08-10 | End: 2021-08-10

## 2021-08-10 RX ADMIN — Medication 5 ML: at 08:49

## 2021-08-10 RX ADMIN — Medication 5 ML: at 08:50

## 2021-08-10 ASSESSMENT — MIFFLIN-ST. JEOR: SCORE: 1856.02

## 2021-08-10 ASSESSMENT — PAIN SCALES - GENERAL: PAINLEVEL: NO PAIN (1)

## 2021-08-10 NOTE — NURSING NOTE
"Oncology Rooming Note    August 10, 2021 9:08 AM   Juvenal Blake is a 55 year old male who presents for:    Chief Complaint   Patient presents with     Blood Draw     Labs drawn from PICC by RN in lab. Vitals taken. Checked into next appointment.      Oncology Clinic Visit     Follicular lymphoma (H)     Initial Vitals: /77 (BP Location: Right arm, Patient Position: Sitting, Cuff Size: Adult Large)   Pulse 72   Temp 97.9  F (36.6  C) (Oral)   Resp 16   Ht 1.715 m (5' 7.52\")   Wt 106.4 kg (234 lb 9.6 oz)   SpO2 99%   BMI 36.18 kg/m   Estimated body mass index is 36.18 kg/m  as calculated from the following:    Height as of this encounter: 1.715 m (5' 7.52\").    Weight as of this encounter: 106.4 kg (234 lb 9.6 oz). Body surface area is 2.25 meters squared.  No Pain (1) Comment: Data Unavailable   No LMP for male patient.  Allergies reviewed: Yes  Medications reviewed: Yes    Medications: Medication refills not needed today.  Pharmacy name entered into [employer]: [facility] #[id number] - [place], [place] - [address] AT [facility]    Clinical concerns: No new concerns       Tyler Morales MA            "

## 2021-08-10 NOTE — PROGRESS NOTES
"BMT Clinic Note      Juvenal Blake is a 55 year old male PMH significant for refractory NHL, undergoing  NK infusion. Admitted with rituxan (biosimilar) infusion reaction 6/24 and remained admitted through completion of lymphodepleting chemo and  NK cell infusion.  Currently day +43 after initiation cycle 1 of      HPI: Here for weekly labs and neurotixicity evaluation. No new issues. Denies nausea. Has loose stools a couple times per week. No HA. Occasional abd pain with movement, not new. No tooth pain - chipped front tooth - has a flipper on and will be pulled next Monday. No fevers or bleeding. Stable SAMS. Denies chest pain.      ROS:  10 point ROS negative except as above.      Physical Exam:   Blood pressure 131/77, pulse 72, temperature 97.9  F (36.6  C), temperature source Oral, resp. rate 16, height 1.715 m (5' 7.52\"), weight 106.4 kg (234 lb 9.6 oz), SpO2 99 %.    Wt Readings from Last 4 Encounters:   08/10/21 106.4 kg (234 lb 9.6 oz)   08/03/21 107 kg (236 lb)   07/29/21 106.3 kg (234 lb 6.4 oz)   07/19/21 102.6 kg (226 lb 3.2 oz)     General: NAD  Eyes: sclera anicteric  Mouth: OP moist without lesions. R front tooth chipped down to base per pt but has flipper in place now. No gum erythema/edema  Lungs: CTAB  Cardiovascular: RRR, no m/r/g  Abdominal/Rectal: normoactive BS, protuberant, NT.    Lymphatics: 2+ pitting edema, not wearing compression stockings.   Skin: no rashes  Neuro: Alert to conversation. Answering questions appropriately  Access: R arm PICC NT -dressing changed today 8/10    ICANS: 10/10 (8/10/21)- pt sentence log is in research folder in the [facility]       ECOG 1    Labs:  Lab Results   Component Value Date    WBC 4.7 08/10/2021    ANEU 0.5 (L) 07/08/2021    HGB 11.6 (L) 08/10/2021    HCT 34.4 (L) 08/10/2021     (L) 08/10/2021     08/10/2021    POTASSIUM 4.0 08/10/2021    CHLORIDE 110 (H) 08/10/2021    CO2 25 08/10/2021     (H) 08/10/2021    BUN 12 " 08/10/2021    CR 0.91 08/10/2021    MAG 1.9 08/10/2021    INR 1.07 06/28/2021    BILITOTAL 0.3 08/10/2021    AST 47 (H) 08/10/2021     (H) 08/10/2021    ALKPHOS 96 08/10/2021    PROTTOTAL 6.0 (L) 08/10/2021    ALBUMIN 3.7 08/10/2021       I have assessed all abnormal lab values for their clinical significance and any values considered clinically significant have been addressed in the assessment and plan.         Assessment and Plan:   Juvenal Blake is a 55 year old male PMH significant for refractory NHL, undergoing  NK infusion. Admitted with rituxan (biosimilar) infusion reaction 6/24 and remained admitted through completion of lymphodepleting chemo and  NK cell infusion.  Currently day +43    #Follicular Lymphoma:  See previous notes regarding prior therapy. Previously recieved Haplo NAM-NK therapy 9/2020 and idelalisib 10/2020 through 6/2021.  Now on  therapy, cycle 1 ( infusion 6/28, 7/6, and 7/12) w/Flu/Cy/Rituxan LD chemo (Rituxan infusion abbreviated d/t severe reaction- see below). Bone marrow biopsy from 7/15 showed small abnormal lymphoid aggregates, seemingly consistent with treated Follicular lymphoma and PET showed markedly improved lymphadenopathy. Formal RECIST is pending, per clinical trial. Overall, suspect this is a nice OK from the , and would advocate for second cycle. Will discuss with trial sponsor about further cycle, pending cardiac evaluation.   - Okay to stop allopurinol for now (will restart with second cycle)    #Rituxan (biosimilar) reaction 6/24/21  Previously tolerated Rituxan many times, but intolerance to biosimilar. Received 2 doses epinephrine, albuterol, and methylpred. Required up to 6L of oxygen. Now resolved.      #HEME: Keep hgb >7g/dL, plt>10,000  - anemic, thrombocytopenic due to recent chemo/disease, but overall stable/improved  - WBC recovered after LD chemo and NK infusion      #CV:   Cardiology consulted for SOB, elevated troponin  "during hospital admission 6/24. Per their note: \"Likely Type II MI in setting of demand ischemia from tachycardia, acute on chronic anemia, acute on chronic illness. No evidence of heart failure at this time.\" Subsequently followed-up outpatient on 7/28/21, with agreement with inpatient evaluation. They plan to complete Coronary CT and then consider high intensity satin and ASA 81mg pending results. Will continue to monitor the outcome of these results for further work-up.    #:   Initial radical prostatectomy and bilateral lymph node dissection. November 15, 2017. Prostatic adenocarcinoma [place] 4+3 = 7 with tertiary pattern 5. Tumor involves 45% of total surface area. Positive for extensive extraprostatic extension. Positive for bilateral seminal vesicle invasion. Multiple margins positive. Lymphovascular invasion not identified. Perineural invasion was noted. Lymph nodes negative. 3 were examined (2 right obturator and 1 left obturator).    Completed radiation to the prostate fossa and pelvic lymph nodes at [facility] early May 2018. He received 4500 cGy in 25 fractions. He then had 2340 cGy boost in 13 fractions to the prostatic fossa, for a total dose of 6240 cGy in 38 treatment fractions delivered over 54 days. He did not receive hormonal therapy:       #ID:   - prophy: ACV, Bactrim  - hx C diff colitis: finished vancomycin 6/23, treated for 7 days. Ppx bid vanco while neutropenic, now off.      #FEN/Renal:   - Creat wnl  - mild hypophos: discussed phos-rich foods/drinks. No repletion otherwise today (phos 2.4). Recheck next week.     # GI:  Fullness to left groin: Now resolved  Abdominal Pain secondary to cancer: oxycodone prn.    Transaminitis: suspect related to cancer. Had improved/resolved, now up slightly again 8/10. Limit Tylenol, etoh and monitor.     # dental  - R tooth with severe chip, ok to pursue dental work now, if we proceed with next cycle and counts drop would be at higher risk for " infection/bleeding if he waits. Amoxicillin 2g prior due to port. - scheduled Monday 8/16     Final Plan:  Phos rich foods/drinks  ICE 10/10 today  FMLA paperwork given to Ar[place] ([place])  8/13 CT angio - will complete this eval before deciding whether or not to pursue second cycle of FATE  8/17 lab and NAYA visit; sooner prn    30 minutes spent on the date of the encounter doing chart review, review of test results, interpretation of tests, patient visit, documentation and discussion with other provider(s)     JAEL LeungC  [phone]

## 2021-08-10 NOTE — LETTER
"    8/10/2021         RE: Juvenal Blake  [address] [place] [address]        Dear Colleague,    Thank you for referring your patient, Juvenal Blake, to the [facility] BLOOD AND MARROW TRANSPLANT PROGRAM [place]. Please see a copy of my visit note below.    BMT Clinic Note      Juvenal Blake is a 55 year old male PMH significant for refractory NHL, undergoing  NK infusion. Admitted with rituxan (biosimilar) infusion reaction 6/24 and remained admitted through completion of lymphodepleting chemo and  NK cell infusion.  Currently day +43 after initiation cycle 1 of      HPI: Here for weekly labs and neurotixicity evaluation. No new issues. Denies nausea. Has loose stools a couple times per week. No HA. Occasional abd pain with movement, not new. No tooth pain - chipped front tooth - has a flipper on and will be pulled next Monday. No fevers or bleeding. Stable SAMS. Denies chest pain.      ROS:  10 point ROS negative except as above.      Physical Exam:   Blood pressure 131/77, pulse 72, temperature 97.9  F (36.6  C), temperature source Oral, resp. rate 16, height 1.715 m (5' 7.52\"), weight 106.4 kg (234 lb 9.6 oz), SpO2 99 %.    Wt Readings from Last 4 Encounters:   08/10/21 106.4 kg (234 lb 9.6 oz)   08/03/21 107 kg (236 lb)   07/29/21 106.3 kg (234 lb 6.4 oz)   07/19/21 102.6 kg (226 lb 3.2 oz)     General: NAD  Eyes: sclera anicteric  Mouth: OP moist without lesions. R front tooth chipped down to base per pt but has flipper in place now. No gum erythema/edema  Lungs: CTAB  Cardiovascular: RRR, no m/r/g  Abdominal/Rectal: normoactive BS, protuberant, NT.    Lymphatics: 2+ pitting edema, not wearing compression stockings.   Skin: no rashes  Neuro: Alert to conversation. Answering questions appropriately  Access: R arm PICC NT -dressing changed today 8/10    ICANS: 10/10 (8/10/21)- pt sentence log is in research folder in the [facility]       ECOG 1    Labs:  Lab Results   Component " Value Date    WBC 4.7 08/10/2021    ANEU 0.5 (L) 07/08/2021    HGB 11.6 (L) 08/10/2021    HCT 34.4 (L) 08/10/2021     (L) 08/10/2021     08/10/2021    POTASSIUM 4.0 08/10/2021    CHLORIDE 110 (H) 08/10/2021    CO2 25 08/10/2021     (H) 08/10/2021    BUN 12 08/10/2021    CR 0.91 08/10/2021    MAG 1.9 08/10/2021    INR 1.07 06/28/2021    BILITOTAL 0.3 08/10/2021    AST 47 (H) 08/10/2021     (H) 08/10/2021    ALKPHOS 96 08/10/2021    PROTTOTAL 6.0 (L) 08/10/2021    ALBUMIN 3.7 08/10/2021       I have assessed all abnormal lab values for their clinical significance and any values considered clinically significant have been addressed in the assessment and plan.         Assessment and Plan:   Juvenal Blake is a 55 year old male PMH significant for refractory NHL, undergoing  NK infusion. Admitted with rituxan (biosimilar) infusion reaction 6/24 and remained admitted through completion of lymphodepleting chemo and  NK cell infusion.  Currently day +43    #Follicular Lymphoma:  See previous notes regarding prior therapy. Previously recieved Haplo NAM-NK therapy 9/2020 and idelalisib 10/2020 through 6/2021.  Now on  therapy, cycle 1 ( infusion 6/28, 7/6, and 7/12) w/Flu/Cy/Rituxan LD chemo (Rituxan infusion abbreviated d/t severe reaction- see below). Bone marrow biopsy from 7/15 showed small abnormal lymphoid aggregates, seemingly consistent with treated Follicular lymphoma and PET showed markedly improved lymphadenopathy. Formal RECIST is pending, per clinical trial. Overall, suspect this is a nice DE from the , and would advocate for second cycle. Will discuss with trial sponsor about further cycle, pending cardiac evaluation.   - Okay to stop allopurinol for now (will restart with second cycle)    #Rituxan (biosimilar) reaction 6/24/21  Previously tolerated Rituxan many times, but intolerance to biosimilar. Received 2 doses epinephrine, albuterol, and methylpred.  "Required up to 6L of oxygen. Now resolved.      #HEME: Keep hgb >7g/dL, plt>10,000  - anemic, thrombocytopenic due to recent chemo/disease, but overall stable/improved  - WBC recovered after LD chemo and NK infusion      #CV:   Cardiology consulted for SOB, elevated troponin during hospital admission 6/24. Per their note: \"Likely Type II MI in setting of demand ischemia from tachycardia, acute on chronic anemia, acute on chronic illness. No evidence of heart failure at this time.\" Subsequently followed-up outpatient on 7/28/21, with agreement with inpatient evaluation. They plan to complete Coronary CT and then consider high intensity satin and ASA 81mg pending results. Will continue to monitor the outcome of these results for further work-up.    #:   Initial radical prostatectomy and bilateral lymph node dissection. November 15, 2017. Prostatic adenocarcinoma [place] 4+3 = 7 with tertiary pattern 5. Tumor involves 45% of total surface area. Positive for extensive extraprostatic extension. Positive for bilateral seminal vesicle invasion. Multiple margins positive. Lymphovascular invasion not identified. Perineural invasion was noted. Lymph nodes negative. 3 were examined (2 right obturator and 1 left obturator).    Completed radiation to the prostate fossa and pelvic lymph nodes at [facility] early May 2018. He received 4500 cGy in 25 fractions. He then had 2340 cGy boost in 13 fractions to the prostatic fossa, for a total dose of 6240 cGy in 38 treatment fractions delivered over 54 days. He did not receive hormonal therapy:       #ID:   - prophy: ACV, Bactrim  - hx C diff colitis: finished vancomycin 6/23, treated for 7 days. Ppx bid vanco while neutropenic, now off.      #FEN/Renal:   - Creat wnl  - mild hypophos: discussed phos-rich foods/drinks. No repletion otherwise today (phos 2.4). Recheck next week.     # GI:  Fullness to left groin: Now resolved  Abdominal Pain secondary to cancer: oxycodone prn. "    Transaminitis: suspect related to cancer. Had improved/resolved, now up slightly again 8/10. Limit Tylenol, etoh and monitor.     # dental  - R tooth with severe chip, ok to pursue dental work now, if we proceed with next cycle and counts drop would be at higher risk for infection/bleeding if he waits. Amoxicillin 2g prior due to port. - scheduled Monday 8/16     Final Plan:  Phos rich foods/drinks  ICE 10/10 today  FMLA paperwork given to Arlen ([place])  8/13 CT angio - will complete this eval before deciding whether or not to pursue second cycle of FATE  8/17 lab and NAYA visit; sooner prn    30 minutes spent on the date of the encounter doing chart review, review of test results, interpretation of tests, patient visit, documentation and discussion with other provider(s)     Jossie Cason PA-C  [phone]                 Again, thank you for allowing me to participate in the care of your patient.        Sincerely,        BMT Advanced Practice Provider

## 2021-08-10 NOTE — NURSING NOTE
Chief Complaint   Patient presents with     Blood Draw     Labs drawn from PICC by RN in lab. Vitals taken. Checked into next appointment.      PICC accessed by RN in lab, labs drawn.  Both lines flushed with heparin.    Vital signs taken.  Pt checked in to next appointment.     Bronwyn Sarabia RN

## 2021-08-10 NOTE — NURSING NOTE
Dressing change completed sterile technique used. Site WDL. Stat lock removed and replaced. Patient tolerated dressing change.  See Dock Flowsheet.     Mustapha Kemp LPN

## 2021-08-11 NOTE — PROGRESS NOTES
[id number]: Study Visit Note   Subject name: Juvenal Blake     Visit: Cycle 1 Day 43    Did the study visit occur within the appropriate window allowed by the protocol? yes    Since the last study visit, He has been doing well. He chipped a tooth on a sandwich and requires a tooth extraction which is scheduled on 8/16. Will continue to monitor and plan cycle 2 of  accordingly.    I have personally interviewed Juvenal Blake and reviewed his medical record for adverse events and concomitant medications and these have been recorded on the corresponding logs in Juvenal Blake's research file.     Juvenal Blake was given the opportunity to ask any trial related questions.  Please see provider progress note for physical exam and other clinical information. Labs were reviewed - any significant lab values were addressed and reviewed.    Emilia Castillo RN

## 2021-08-13 ENCOUNTER — HOSPITAL ENCOUNTER (OUTPATIENT)
Dept: CT IMAGING | Facility: CLINIC | Age: 56
End: 2021-08-13
Attending: INTERNAL MEDICINE
Payer: COMMERCIAL

## 2021-08-13 VITALS
SYSTOLIC BLOOD PRESSURE: 124 MMHG | OXYGEN SATURATION: 99 % | HEART RATE: 60 BPM | RESPIRATION RATE: 16 BRPM | DIASTOLIC BLOOD PRESSURE: 69 MMHG

## 2021-08-13 DIAGNOSIS — R06.09 DYSPNEA ON EXERTION: ICD-10-CM

## 2021-08-13 DIAGNOSIS — I20.89 STABLE ANGINA PECTORIS (H): ICD-10-CM

## 2021-08-13 DIAGNOSIS — C82.08 FOLLICULAR LYMPHOMA GRADE I, LYMPH NODES OF MULTIPLE SITES (H): Primary | ICD-10-CM

## 2021-08-13 PROCEDURE — 250N000013 HC RX MED GY IP 250 OP 250 PS 637: Performed by: INTERNAL MEDICINE

## 2021-08-13 PROCEDURE — 250N000009 HC RX 250: Performed by: INTERNAL MEDICINE

## 2021-08-13 PROCEDURE — 75574 CT ANGIO HRT W/3D IMAGE: CPT

## 2021-08-13 PROCEDURE — 250N000011 HC RX IP 250 OP 636: Performed by: INTERNAL MEDICINE

## 2021-08-13 PROCEDURE — 75574 CT ANGIO HRT W/3D IMAGE: CPT | Mod: 26 | Performed by: INTERNAL MEDICINE

## 2021-08-13 RX ORDER — NITROGLYCERIN 0.4 MG/1
.4-.8 TABLET SUBLINGUAL
Status: DISCONTINUED | OUTPATIENT
Start: 2021-08-13 | End: 2021-08-14 | Stop reason: HOSPADM

## 2021-08-13 RX ORDER — DIPHENHYDRAMINE HCL 25 MG
25 CAPSULE ORAL
Status: DISCONTINUED | OUTPATIENT
Start: 2021-08-13 | End: 2021-08-14 | Stop reason: HOSPADM

## 2021-08-13 RX ORDER — ONDANSETRON 2 MG/ML
4 INJECTION INTRAMUSCULAR; INTRAVENOUS
Status: DISCONTINUED | OUTPATIENT
Start: 2021-08-13 | End: 2021-08-14 | Stop reason: HOSPADM

## 2021-08-13 RX ORDER — DIPHENHYDRAMINE HYDROCHLORIDE 50 MG/ML
25-50 INJECTION INTRAMUSCULAR; INTRAVENOUS
Status: DISCONTINUED | OUTPATIENT
Start: 2021-08-13 | End: 2021-08-14 | Stop reason: HOSPADM

## 2021-08-13 RX ORDER — IOPAMIDOL 755 MG/ML
120 INJECTION, SOLUTION INTRAVASCULAR ONCE
Status: COMPLETED | OUTPATIENT
Start: 2021-08-13 | End: 2021-08-13

## 2021-08-13 RX ORDER — METHYLPREDNISOLONE SODIUM SUCCINATE 125 MG/2ML
125 INJECTION, POWDER, LYOPHILIZED, FOR SOLUTION INTRAMUSCULAR; INTRAVENOUS
Status: DISCONTINUED | OUTPATIENT
Start: 2021-08-13 | End: 2021-08-14 | Stop reason: HOSPADM

## 2021-08-13 RX ORDER — ACYCLOVIR 200 MG/1
0-1 CAPSULE ORAL
Status: DISCONTINUED | OUTPATIENT
Start: 2021-08-13 | End: 2021-08-14 | Stop reason: HOSPADM

## 2021-08-13 RX ORDER — METOPROLOL TARTRATE 25 MG/1
25-100 TABLET, FILM COATED ORAL
Status: COMPLETED | OUTPATIENT
Start: 2021-08-13 | End: 2021-08-13

## 2021-08-13 RX ORDER — METOPROLOL TARTRATE 1 MG/ML
5-15 INJECTION, SOLUTION INTRAVENOUS
Status: DISCONTINUED | OUTPATIENT
Start: 2021-08-13 | End: 2021-08-14 | Stop reason: HOSPADM

## 2021-08-13 RX ORDER — IVABRADINE 5 MG/1
5-15 TABLET, FILM COATED ORAL
Status: COMPLETED | OUTPATIENT
Start: 2021-08-13 | End: 2021-08-13

## 2021-08-13 RX ADMIN — METOPROLOL TARTRATE 50 MG: 50 TABLET, FILM COATED ORAL at 08:23

## 2021-08-13 RX ADMIN — IVABRADINE 10 MG: 5 TABLET, FILM COATED ORAL at 08:24

## 2021-08-13 RX ADMIN — NITROGLYCERIN 0.8 MG: 0.4 TABLET SUBLINGUAL at 09:49

## 2021-08-13 RX ADMIN — IOPAMIDOL 120 ML: 755 INJECTION, SOLUTION INTRAVENOUS at 09:42

## 2021-08-13 RX ADMIN — METOPROLOL TARTRATE 10 MG: 1 INJECTION, SOLUTION INTRAVENOUS at 09:24

## 2021-08-13 NOTE — PROGRESS NOTES
Pt arrived for Coronary CT angiogram. Test, meds and side effects reviewed with pt. Resting HR 75 bpm; given 50 mg PO Metoprolol + 10 mg PO Ivabradine per verbal order. Administered 0.8 mg SL nitro and 10 mg IV metoprolol on CTA table per order. CTA completed; tolerated procedure well and denies symptoms of allergic reaction. Post monitoring completed and VSS. D/C instructions reviewed with pt whom verbalized understanding of need to increase PO fluids today. D/C to gold waiting room accompanied by staff.

## 2021-08-17 ENCOUNTER — APPOINTMENT (OUTPATIENT)
Dept: LAB | Facility: CLINIC | Age: 56
End: 2021-08-17
Attending: PHYSICIAN ASSISTANT
Payer: COMMERCIAL

## 2021-08-17 ENCOUNTER — ONCOLOGY VISIT (OUTPATIENT)
Dept: TRANSPLANT | Facility: CLINIC | Age: 56
End: 2021-08-17
Attending: PHYSICIAN ASSISTANT
Payer: COMMERCIAL

## 2021-08-17 VITALS
OXYGEN SATURATION: 97 % | DIASTOLIC BLOOD PRESSURE: 70 MMHG | HEART RATE: 65 BPM | HEIGHT: 68 IN | WEIGHT: 234.5 LBS | RESPIRATION RATE: 18 BRPM | TEMPERATURE: 98 F | BODY MASS INDEX: 35.54 KG/M2 | SYSTOLIC BLOOD PRESSURE: 114 MMHG

## 2021-08-17 DIAGNOSIS — C82.08 FOLLICULAR LYMPHOMA GRADE I, LYMPH NODES OF MULTIPLE SITES (H): ICD-10-CM

## 2021-08-17 DIAGNOSIS — R11.0 NAUSEA: ICD-10-CM

## 2021-08-17 LAB
ALBUMIN SERPL-MCNC: 3.9 G/DL (ref 3.4–5)
ALP SERPL-CCNC: 82 U/L (ref 40–150)
ALT SERPL W P-5'-P-CCNC: 61 U/L (ref 0–70)
ANION GAP SERPL CALCULATED.3IONS-SCNC: 8 MMOL/L (ref 3–14)
AST SERPL W P-5'-P-CCNC: 32 U/L (ref 0–45)
BASOPHILS # BLD AUTO: 0 10E3/UL (ref 0–0.2)
BASOPHILS NFR BLD AUTO: 0 %
BILIRUB DIRECT SERPL-MCNC: 0.2 MG/DL (ref 0–0.2)
BILIRUB SERPL-MCNC: 0.6 MG/DL (ref 0.2–1.3)
BUN SERPL-MCNC: 12 MG/DL (ref 7–30)
CALCIUM SERPL-MCNC: 9.4 MG/DL (ref 8.5–10.1)
CHLORIDE BLD-SCNC: 108 MMOL/L (ref 94–109)
CO2 SERPL-SCNC: 27 MMOL/L (ref 20–32)
CREAT SERPL-MCNC: 0.98 MG/DL (ref 0.66–1.25)
EOSINOPHIL # BLD AUTO: 0.1 10E3/UL (ref 0–0.7)
EOSINOPHIL NFR BLD AUTO: 4 %
ERYTHROCYTE [DISTWIDTH] IN BLOOD BY AUTOMATED COUNT: 15.2 % (ref 10–15)
GFR SERPL CREATININE-BSD FRML MDRD: 85 ML/MIN/1.73M2
GLUCOSE BLD-MCNC: 136 MG/DL (ref 70–99)
HCT VFR BLD AUTO: 34.9 % (ref 40–53)
HGB BLD-MCNC: 11.9 G/DL (ref 13.3–17.7)
IMM GRANULOCYTES # BLD: 0 10E3/UL
IMM GRANULOCYTES NFR BLD: 1 %
LDH SERPL L TO P-CCNC: 192 U/L (ref 85–227)
LYMPHOCYTES # BLD AUTO: 0.8 10E3/UL (ref 0.8–5.3)
LYMPHOCYTES NFR BLD AUTO: 26 %
MAGNESIUM SERPL-MCNC: 2.2 MG/DL (ref 1.6–2.3)
MCH RBC QN AUTO: 32.3 PG (ref 26.5–33)
MCHC RBC AUTO-ENTMCNC: 34.1 G/DL (ref 31.5–36.5)
MCV RBC AUTO: 95 FL (ref 78–100)
MONOCYTES # BLD AUTO: 0.4 10E3/UL (ref 0–1.3)
MONOCYTES NFR BLD AUTO: 13 %
NEUTROPHILS # BLD AUTO: 1.7 10E3/UL (ref 1.6–8.3)
NEUTROPHILS NFR BLD AUTO: 56 %
NRBC # BLD AUTO: 0 10E3/UL
NRBC BLD AUTO-RTO: 0 /100
PHOSPHATE SERPL-MCNC: 2.9 MG/DL (ref 2.5–4.5)
PLATELET # BLD AUTO: 96 10E3/UL (ref 150–450)
POTASSIUM BLD-SCNC: 3.9 MMOL/L (ref 3.4–5.3)
PROT SERPL-MCNC: 6.1 G/DL (ref 6.8–8.8)
RBC # BLD AUTO: 3.68 10E6/UL (ref 4.4–5.9)
SODIUM SERPL-SCNC: 143 MMOL/L (ref 133–144)
URATE SERPL-MCNC: 4.4 MG/DL (ref 3.5–7.2)
WBC # BLD AUTO: 3 10E3/UL (ref 4–11)

## 2021-08-17 PROCEDURE — 80053 COMPREHEN METABOLIC PANEL: CPT

## 2021-08-17 PROCEDURE — 84100 ASSAY OF PHOSPHORUS: CPT

## 2021-08-17 PROCEDURE — 99214 OFFICE O/P EST MOD 30 MIN: CPT

## 2021-08-17 PROCEDURE — 36592 COLLECT BLOOD FROM PICC: CPT

## 2021-08-17 PROCEDURE — G0463 HOSPITAL OUTPT CLINIC VISIT: HCPCS

## 2021-08-17 PROCEDURE — 84550 ASSAY OF BLOOD/URIC ACID: CPT

## 2021-08-17 PROCEDURE — 85004 AUTOMATED DIFF WBC COUNT: CPT

## 2021-08-17 PROCEDURE — 82248 BILIRUBIN DIRECT: CPT

## 2021-08-17 PROCEDURE — 83615 LACTATE (LD) (LDH) ENZYME: CPT

## 2021-08-17 PROCEDURE — 83735 ASSAY OF MAGNESIUM: CPT

## 2021-08-17 RX ORDER — ALBUTEROL SULFATE 90 UG/1
AEROSOL, METERED RESPIRATORY (INHALATION)
COMMUNITY
Start: 2021-08-17 | End: 2022-07-26

## 2021-08-17 RX ORDER — AZELASTINE 1 MG/ML
1 SPRAY, METERED NASAL 2 TIMES DAILY PRN
COMMUNITY
Start: 2021-08-17 | End: 2021-11-19

## 2021-08-17 RX ORDER — PROCHLORPERAZINE MALEATE 5 MG
5 TABLET ORAL EVERY 6 HOURS PRN
Qty: 60 TABLET | Refills: 1 | COMMUNITY
Start: 2021-08-17 | End: 2021-11-19

## 2021-08-17 RX ORDER — FLUTICASONE PROPIONATE 50 MCG
1 SPRAY, SUSPENSION (ML) NASAL DAILY PRN
COMMUNITY
Start: 2021-08-17 | End: 2021-11-19

## 2021-08-17 RX ORDER — BECLOMETHASONE DIPROPIONATE HFA 80 UG/1
1 AEROSOL, METERED RESPIRATORY (INHALATION) DAILY PRN
COMMUNITY
Start: 2021-08-17 | End: 2022-07-26

## 2021-08-17 RX ORDER — TOLTERODINE 4 MG/1
4 CAPSULE, EXTENDED RELEASE ORAL DAILY
COMMUNITY
Start: 2021-08-17 | End: 2021-11-24

## 2021-08-17 ASSESSMENT — PAIN SCALES - GENERAL: PAINLEVEL: NO PAIN (1)

## 2021-08-17 ASSESSMENT — MIFFLIN-ST. JEOR: SCORE: 1855.57

## 2021-08-17 NOTE — NURSING NOTE
Sterile technique was used to change patient's piocc dressing.  Patient tolerated dressing change with no incident.     Chen Lucero [facility] August 17, 2021

## 2021-08-17 NOTE — NURSING NOTE
"Oncology Rooming Note    August 17, 2021 9:10 AM   Juvenal Blake is a 55 year old male who presents for:    Chief Complaint   Patient presents with     Blood Draw     Labs drawn via picc by RN in lab. VS taken.      Oncology Clinic Visit     [facility] RETURN - FOLLICULAR LYMPHOMA     Initial Vitals: /70 (BP Location: Left arm, Patient Position: Sitting, Cuff Size: Adult Large)   Pulse 65   Temp 98  F (36.7  C) (Oral)   Resp 18   Ht 1.715 m (5' 7.52\")   Wt 106.4 kg (234 lb 8 oz)   SpO2 97%   BMI 36.16 kg/m   Estimated body mass index is 36.16 kg/m  as calculated from the following:    Height as of this encounter: 1.715 m (5' 7.52\").    Weight as of this encounter: 106.4 kg (234 lb 8 oz). Body surface area is 2.25 meters squared.  No Pain (1) Comment: Data Unavailable   No LMP for male patient.  Allergies reviewed: Yes  Medications reviewed: Yes    Medications: Medication refills not needed today.  Pharmacy name entered into [employer]: [id number] - [place], [place] - [address] AT [facility]    Clinical concerns: No new concerns. Gloyr was notified.      Mustapha Kemp LPN            "

## 2021-08-17 NOTE — LETTER
"    8/17/2021         RE: Juvenal Blake  [address] [place] [address]        Dear Colleague,    Thank you for referring your patient, Juvenal Blake, to the [facility] BLOOD AND MARROW TRANSPLANT PROGRAM [place]. Please see a copy of my visit note below.    BMT Clinic Note      Juvenal Blake is a 55 year old male PMH significant for refractory NHL, undergoing  NK infusion. Admitted with rituxan (biosimilar) infusion reaction 6/24 and remained admitted through completion of lymphodepleting chemo and  NK cell infusion.  Currently day +50 after initiation cycle 1 of      HPI: Here for research driven labs and neurotixicity evaluation. No new issues. Denies nausea, emesis or diarrhea. No fevers..No HA or any confusion. Occasional abd pain with movement ie when he is driving he gets a pain in his abomen (under naval) that lasts seconds, not new when goes over bump. No tooth pain - chipped front tooth - was not pulled out on 8/16 but Juvenal says will reschedule with oral surgeon but did not want to have it pulled if jeopardizes him getting further cycles this week.. No  bleeding. Stable SAMS, no cough.No chest pain or funny heartbeats.      ROS:  10 point ROS negative except as above.      Physical Exam:   Blood pressure 114/70, pulse 65, temperature 98  F (36.7  C), temperature source Oral, resp. rate 18, height 1.715 m (5' 7.52\"), weight 106.4 kg (234 lb 8 oz), SpO2 97 %.    Wt Readings from Last 4 Encounters:   08/17/21 106.4 kg (234 lb 8 oz)   08/10/21 106.4 kg (234 lb 9.6 oz)   08/03/21 107 kg (236 lb)   07/29/21 106.3 kg (234 lb 6.4 oz)     General: NAD  Eyes: sclera anicteric  Mouth: OP moist without lesions. R front tooth/central incisor chipped down to base per pt but has flipper in place now. No gum erythema/edema  Lungs: CTAB  Cardiovascular: RRR, no m/r/g  Abdominal/Rectal: normoactive BS, protuberant, NT to central abdomen.    Lymphatics: 1+ pitting edema, not wearing " compression stockings.   Skin: no rashes  LAD:  No necvk, supraclavicular or axillary LN palpated  Neuro: Alert to conversation. Answering questions appropriately  Access: R arm PICC NT -dressing changed today 8/16    ICANS: 10/10 (8/16/21)- pt sentence log is in research folder in the [facility]  -sentence looked good     ECOG 1--Addendum: Done 8/17/2021--Glory Covarrubias PA-C    Labs:  Lab Results   Component Value Date    WBC 3.0 (L) 08/17/2021    ANEU 0.5 (L) 07/08/2021    HGB 11.9 (L) 08/17/2021    HCT 34.9 (L) 08/17/2021    PLT 96 (L) 08/17/2021     08/17/2021    POTASSIUM 3.9 08/17/2021    CHLORIDE 108 08/17/2021    CO2 27 08/17/2021     (H) 08/17/2021    BUN 12 08/17/2021    CR 0.98 08/17/2021    MAG 2.2 08/17/2021    INR 1.07 06/28/2021    BILITOTAL 0.6 08/17/2021    AST 32 08/17/2021    ALT 61 08/17/2021    ALKPHOS 82 08/17/2021    PROTTOTAL 6.1 (L) 08/17/2021    ALBUMIN 3.9 08/17/2021       I have assessed all abnormal lab values for their clinical significance and any values considered clinically significant have been addressed in the assessment and plan.         Assessment and Plan:   Juvenal Blake is a 55 year old male PMH significant for refractory NHL, undergoing  NK infusion. Admitted with rituxan (biosimilar) infusion reaction 6/24 and remained admitted through completion of lymphodepleting chemo and  NK cell infusion.  Currently day +50    #Follicular Lymphoma:  See previous notes regarding prior therapy. Previously recieved Haplo NAM-NK therapy 9/2020 and idelalisib 10/2020 through 6/2021.  Now on  therapy, cycle 1 ( infusion 6/28, 7/6, and 7/12) w/Flu/Cy/Rituxan LD chemo (Rituxan infusion abbreviated d/t severe reaction- see below). Bone marrow biopsy from 7/15 showed small abnormal lymphoid aggregates, seemingly consistent with treated Follicular lymphoma and PET showed markedly improved lymphadenopathy. Formal RECIST is pending, per clinical trial. Overall, suspect this  "is a nice SC from the , and would advocate for second cycle. DOM will discuss with trial sponsor about further cycle, pending cardiac evaluation and dental eval and meeting with YOLANDA.   - Stopped allopurinol for now (will restart with second cycle)    #Rituxan (biosimilar) reaction 6/24/21  Previously tolerated Rituxan many times, but intolerance to biosimilar. Received 2 doses epinephrine, albuterol, and methylpred. Required up to 6L of oxygen. Now resolved.      #HEME: Keep hgb >7g/dL, plt>10,000  - anemic, thrombocytopenic due to recent chemo/disease  - WBC recovered after LD chemo and NK infusion      #CV:   Cardiology consulted for SOB, elevated troponin during hospital admission 6/24. Per their note: \"Likely Type II MI in setting of demand ischemia from tachycardia, acute on chronic anemia, acute on chronic illness. No evidence of heart failure at this time.\" Subsequently followed-up outpatient on 7/28/21, with agreement with inpatient evaluation. He completed a  Coronary CT on 8/13 and per research nurse Emilia: He saw a cardiologist, who recommended a CT angio which was performed on 8/13. There is a radiologist read but not a cardiologist read. She sent a message to his cardiologist who ordered it asking him to weigh in. Technically he does not have cards clearance at this time.        #:   Initial radical prostatectomy and bilateral lymph node dissection. November 15, 2017. Prostatic adenocarcinoma [place] 4+3 = 7 with tertiary pattern 5. Tumor involves 45% of total surface area. Positive for extensive extraprostatic extension. Positive for bilateral seminal vesicle invasion. Multiple margins positive. Lymphovascular invasion not identified. Perineural invasion was noted. Lymph nodes negative. 3 were examined (2 right obturator and 1 left obturator).    Completed radiation to the prostate fossa and pelvic lymph nodes at [facility] early May 2018. He received 4500 cGy in 25 fractions. He then " had 2340 cGy boost in 13 fractions to the prostatic fossa, for a total dose of 6240 cGy in 38 treatment fractions delivered over 54 days. He did not receive hormonal therapy:       #ID:   - prophy: ACV, Bactrim- he has not been taking bactrim- he says he was told to stop sometime-inquired if this is for current protocol or just 1 year post NAM NK therapy? Research RN said there is no guidance and pharmacist said they think this is 6 months so will not refill   - hx C diff colitis: finished vancomycin 6/23, treated for 7 days. Ppx bid vanco while neutropenic, now off.      #FEN/Renal:   - Creat wnl  - mild hypophos: discussed phos-rich foods/drinks. No repletion otherwise today (phos 2.4). Recheck=2.9.     # GI:  Fullness to left groin: Now resolved  Abdominal Pain secondary to cancer: oxycodone prn.    Transaminitis: suspect related to cancer. Had improved/resolved, then up slightly again 8/10. Limit Tylenol, etoh and monitor.In normal range today     # dental  - R tooth with severe chip,was scheduled  To have tooth removed on 8/16, if we proceed with next cycle and counts drop would be at higher risk for infection/bleeding if he waits. If has procedure would need Amoxicillin 2g prior due to port. -  but Juvenal says will reschedule with oral surgeon but did not want to have it pulled if jeopardizes him getting further cycles this week.     Final Plan:  Needs a decision on whether or not to pursue second cycle of FATE so per Research RN he is being evaluated by DOM on 8/19 and are waiting on requested cardiology read of CT angio coronary artery( see above for eval) and for decision on right front incisior  8/19 lab and DOM visit; sooner prn    30 minutes spent on the date of the encounter doing chart review, review of test results, interpretation of tests, patient visit, documentation and discussion with other provider(s)     Glory Covarrubias PA-C  [phone]               Again, thank you for allowing me to participate in  the care of your patient.        Sincerely,        BMT Advanced Practice Provider

## 2021-08-17 NOTE — PROGRESS NOTES
"BMT Clinic Note      Juvenal Blake is a 55 year old male PMH significant for refractory NHL, undergoing  NK infusion. Admitted with rituxan (biosimilar) infusion reaction 6/24 and remained admitted through completion of lymphodepleting chemo and  NK cell infusion.  Currently day +50 after initiation cycle 1 of      HPI: Here for research driven labs and neurotixicity evaluation. No new issues. Denies nausea, emesis or diarrhea. No fevers..No HA or any confusion. Occasional abd pain with movement ie when he is driving he gets a pain in his abomen (under naval) that lasts seconds, not new when goes over bump. No tooth pain - chipped front tooth - was not pulled out on 8/16 but Juvenal says will reschedule with oral surgeon but did not want to have it pulled if jeopardizes him getting further cycles this week.. No  bleeding. Stable SAMS, no cough.No chest pain or funny heartbeats.      ROS:  10 point ROS negative except as above.      Physical Exam:   Blood pressure 114/70, pulse 65, temperature 98  F (36.7  C), temperature source Oral, resp. rate 18, height 1.715 m (5' 7.52\"), weight 106.4 kg (234 lb 8 oz), SpO2 97 %.    Wt Readings from Last 4 Encounters:   08/17/21 106.4 kg (234 lb 8 oz)   08/10/21 106.4 kg (234 lb 9.6 oz)   08/03/21 107 kg (236 lb)   07/29/21 106.3 kg (234 lb 6.4 oz)     General: NAD  Eyes: sclera anicteric  Mouth: OP moist without lesions. R front tooth/central incisor chipped down to base per pt but has flipper in place now. No gum erythema/edema  Lungs: CTAB  Cardiovascular: RRR, no m/r/g  Abdominal/Rectal: normoactive BS, protuberant, NT to central abdomen.    Lymphatics: 1+ pitting edema, not wearing compression stockings.   Skin: no rashes  LAD:  No necvk, supraclavicular or axillary LN palpated  Neuro: Alert to conversation. Answering questions appropriately  Access: R arm PICC NT -dressing changed today 8/16    ICANS: 10/10 (8/16/21)- pt sentence log is in research folder in " the [facility]  -sentence looked good     ECOG 1--Addendum: Done 8/17/2021--Glory Covarrubias PA-C    Labs:  Lab Results   Component Value Date    WBC 3.0 (L) 08/17/2021    ANEU 0.5 (L) 07/08/2021    HGB 11.9 (L) 08/17/2021    HCT 34.9 (L) 08/17/2021    PLT 96 (L) 08/17/2021     08/17/2021    POTASSIUM 3.9 08/17/2021    CHLORIDE 108 08/17/2021    CO2 27 08/17/2021     (H) 08/17/2021    BUN 12 08/17/2021    CR 0.98 08/17/2021    MAG 2.2 08/17/2021    INR 1.07 06/28/2021    BILITOTAL 0.6 08/17/2021    AST 32 08/17/2021    ALT 61 08/17/2021    ALKPHOS 82 08/17/2021    PROTTOTAL 6.1 (L) 08/17/2021    ALBUMIN 3.9 08/17/2021       I have assessed all abnormal lab values for their clinical significance and any values considered clinically significant have been addressed in the assessment and plan.         Assessment and Plan:   Juvenal Blake is a 55 year old male PMH significant for refractory NHL, undergoing  NK infusion. Admitted with rituxan (biosimilar) infusion reaction 6/24 and remained admitted through completion of lymphodepleting chemo and  NK cell infusion.  Currently day +50    #Follicular Lymphoma:  See previous notes regarding prior therapy. Previously recieved Haplo NAM-NK therapy 9/2020 and idelalisib 10/2020 through 6/2021.  Now on  therapy, cycle 1 ( infusion 6/28, 7/6, and 7/12) w/Flu/Cy/Rituxan LD chemo (Rituxan infusion abbreviated d/t severe reaction- see below). Bone marrow biopsy from 7/15 showed small abnormal lymphoid aggregates, seemingly consistent with treated Follicular lymphoma and PET showed markedly improved lymphadenopathy. Formal RECIST is pending, per clinical trial. Overall, suspect this is a nice ND from the , and would advocate for second cycle. DOM will discuss with trial sponsor about further cycle, pending cardiac evaluation and dental eval and meeting with DOM.   - Stopped allopurinol for now (will restart with second cycle)    #Rituxan (biosimilar)  "reaction 6/24/21  Previously tolerated Rituxan many times, but intolerance to biosimilar. Received 2 doses epinephrine, albuterol, and methylpred. Required up to 6L of oxygen. Now resolved.      #HEME: Keep hgb >7g/dL, plt>10,000  - anemic, thrombocytopenic due to recent chemo/disease  - WBC recovered after LD chemo and NK infusion      #CV:   Cardiology consulted for SOB, elevated troponin during hospital admission 6/24. Per their note: \"Likely Type II MI in setting of demand ischemia from tachycardia, acute on chronic anemia, acute on chronic illness. No evidence of heart failure at this time.\" Subsequently followed-up outpatient on 7/28/21, with agreement with inpatient evaluation. He completed a  Coronary CT on 8/13 and per research nurse Emilia: He saw a cardiologist, who recommended a CT angio which was performed on 8/13. There is a radiologist read but not a cardiologist read. She sent a message to his cardiologist who ordered it asking him to weigh in. Technically he does not have cards clearance at this time.        #:   Initial radical prostatectomy and bilateral lymph node dissection. November 15, 2017. Prostatic adenocarcinoma [place] 4+3 = 7 with tertiary pattern 5. Tumor involves 45% of total surface area. Positive for extensive extraprostatic extension. Positive for bilateral seminal vesicle invasion. Multiple margins positive. Lymphovascular invasion not identified. Perineural invasion was noted. Lymph nodes negative. 3 were examined (2 right obturator and 1 left obturator).    Completed radiation to the prostate fossa and pelvic lymph nodes at [facility] early May 2018. He received 4500 cGy in 25 fractions. He then had 2340 cGy boost in 13 fractions to the prostatic fossa, for a total dose of 6240 cGy in 38 treatment fractions delivered over 54 days. He did not receive hormonal therapy:       #ID:   - prophy: ACV, Bactrim- he has not been taking bactrim- he says he was told to stop " sometime-inquired if this is for current protocol or just 1 year post NAM NK therapy? Research RN said there is no guidance and pharmacist said they think this is 6 months so will not refill   - hx C diff colitis: finished vancomycin 6/23, treated for 7 days. Ppx bid vanco while neutropenic, now off.      #FEN/Renal:   - Creat wnl  - mild hypophos: discussed phos-rich foods/drinks. No repletion otherwise today (phos 2.4). Recheck=2.9.     # GI:  Fullness to left groin: Now resolved  Abdominal Pain secondary to cancer: oxycodone prn.    Transaminitis: suspect related to cancer. Had improved/resolved, then up slightly again 8/10. Limit Tylenol, etoh and monitor.In normal range today     # dental  - R tooth with severe chip,was scheduled  To have tooth removed on 8/16, if we proceed with next cycle and counts drop would be at higher risk for infection/bleeding if he waits. If has procedure would need Amoxicillin 2g prior due to port. -  but Juvenal says will reschedule with oral surgeon but did not want to have it pulled if jeopardizes him getting further cycles this week.     Final Plan:  Needs a decision on whether or not to pursue second cycle of FATE so per Research RN he is being evaluated by DOM on 8/19 and are waiting on requested cardiology read of CT angio coronary artery( see above for eval) and for decision on right front incisior  8/19 lab and DOM visit; sooner prn    30 minutes spent on the date of the encounter doing chart review, review of test results, interpretation of tests, patient visit, documentation and discussion with other provider(s)     Glory Covarrubias PA-C  [phone]

## 2021-08-17 NOTE — NURSING NOTE
Chief Complaint   Patient presents with     Blood Draw     Labs drawn via picc by RN in lab. VS taken.      Labs collected from PICC.  Red line flushed with saline and heparin locked, purple line heparin locked. Caps changed. Vitals taken and pt checked in for appt.    Merle Mendez RN

## 2021-08-18 DIAGNOSIS — C82.08 FOLLICULAR LYMPHOMA GRADE I, LYMPH NODES OF MULTIPLE SITES (H): Primary | ICD-10-CM

## 2021-08-19 ENCOUNTER — ONCOLOGY VISIT (OUTPATIENT)
Dept: TRANSPLANT | Facility: CLINIC | Age: 56
End: 2021-08-19
Attending: INTERNAL MEDICINE
Payer: COMMERCIAL

## 2021-08-19 ENCOUNTER — APPOINTMENT (OUTPATIENT)
Dept: LAB | Facility: CLINIC | Age: 56
End: 2021-08-19
Attending: INTERNAL MEDICINE
Payer: COMMERCIAL

## 2021-08-19 VITALS
DIASTOLIC BLOOD PRESSURE: 63 MMHG | BODY MASS INDEX: 35.61 KG/M2 | OXYGEN SATURATION: 100 % | WEIGHT: 230.9 LBS | SYSTOLIC BLOOD PRESSURE: 111 MMHG | RESPIRATION RATE: 16 BRPM | TEMPERATURE: 97.5 F | HEART RATE: 66 BPM

## 2021-08-19 DIAGNOSIS — C82.08 FOLLICULAR LYMPHOMA GRADE I, LYMPH NODES OF MULTIPLE SITES (H): Primary | ICD-10-CM

## 2021-08-19 LAB
ALBUMIN SERPL-MCNC: 4.1 G/DL (ref 3.4–5)
ALP SERPL-CCNC: 87 U/L (ref 40–150)
ALT SERPL W P-5'-P-CCNC: 87 U/L (ref 0–70)
ANION GAP SERPL CALCULATED.3IONS-SCNC: 6 MMOL/L (ref 3–14)
AST SERPL W P-5'-P-CCNC: 36 U/L (ref 0–45)
BASOPHILS # BLD AUTO: 0 10E3/UL (ref 0–0.2)
BASOPHILS NFR BLD AUTO: 1 %
BILIRUB SERPL-MCNC: 0.7 MG/DL (ref 0.2–1.3)
BUN SERPL-MCNC: 11 MG/DL (ref 7–30)
CALCIUM SERPL-MCNC: 9 MG/DL (ref 8.5–10.1)
CHLORIDE BLD-SCNC: 110 MMOL/L (ref 94–109)
CO2 SERPL-SCNC: 27 MMOL/L (ref 20–32)
CREAT SERPL-MCNC: 0.99 MG/DL (ref 0.66–1.25)
CULTURE HARVEST COMPLETE DATE: NORMAL
EOSINOPHIL # BLD AUTO: 0.2 10E3/UL (ref 0–0.7)
EOSINOPHIL NFR BLD AUTO: 4 %
ERYTHROCYTE [DISTWIDTH] IN BLOOD BY AUTOMATED COUNT: 14.7 % (ref 10–15)
GFR SERPL CREATININE-BSD FRML MDRD: 84 ML/MIN/1.73M2
GLUCOSE BLD-MCNC: 118 MG/DL (ref 70–99)
HCT VFR BLD AUTO: 35.5 % (ref 40–53)
HGB BLD-MCNC: 12.2 G/DL (ref 13.3–17.7)
IMM GRANULOCYTES # BLD: 0 10E3/UL
IMM GRANULOCYTES NFR BLD: 1 %
LYMPHOCYTES # BLD AUTO: 1.2 10E3/UL (ref 0.8–5.3)
LYMPHOCYTES NFR BLD AUTO: 32 %
MCH RBC QN AUTO: 32.4 PG (ref 26.5–33)
MCHC RBC AUTO-ENTMCNC: 34.4 G/DL (ref 31.5–36.5)
MCV RBC AUTO: 94 FL (ref 78–100)
MONOCYTES # BLD AUTO: 0.5 10E3/UL (ref 0–1.3)
MONOCYTES NFR BLD AUTO: 13 %
NEUTROPHILS # BLD AUTO: 1.8 10E3/UL (ref 1.6–8.3)
NEUTROPHILS NFR BLD AUTO: 49 %
NRBC # BLD AUTO: 0 10E3/UL
NRBC BLD AUTO-RTO: 0 /100
PLATELET # BLD AUTO: 110 10E3/UL (ref 150–450)
POTASSIUM BLD-SCNC: 3.9 MMOL/L (ref 3.4–5.3)
PROT SERPL-MCNC: 6.4 G/DL (ref 6.8–8.8)
RBC # BLD AUTO: 3.76 10E6/UL (ref 4.4–5.9)
SODIUM SERPL-SCNC: 143 MMOL/L (ref 133–144)
WBC # BLD AUTO: 3.7 10E3/UL (ref 4–11)

## 2021-08-19 PROCEDURE — G0463 HOSPITAL OUTPT CLINIC VISIT: HCPCS

## 2021-08-19 PROCEDURE — 80053 COMPREHEN METABOLIC PANEL: CPT

## 2021-08-19 PROCEDURE — 99214 OFFICE O/P EST MOD 30 MIN: CPT

## 2021-08-19 PROCEDURE — 250N000011 HC RX IP 250 OP 636: Performed by: INTERNAL MEDICINE

## 2021-08-19 PROCEDURE — 36592 COLLECT BLOOD FROM PICC: CPT

## 2021-08-19 PROCEDURE — 85025 COMPLETE CBC W/AUTO DIFF WBC: CPT

## 2021-08-19 RX ORDER — DIPHENHYDRAMINE HYDROCHLORIDE 50 MG/ML
50 INJECTION INTRAMUSCULAR; INTRAVENOUS
Status: CANCELLED
Start: 2021-08-25

## 2021-08-19 RX ORDER — DIPHENHYDRAMINE HCL 25 MG
25 CAPSULE ORAL ONCE
Status: CANCELLED
Start: 2021-09-08

## 2021-08-19 RX ORDER — ACETAMINOPHEN 325 MG/1
650 TABLET ORAL EVERY 4 HOURS
Status: CANCELLED
Start: 2021-09-08

## 2021-08-19 RX ORDER — GRANISETRON HYDROCHLORIDE 1 MG/ML
1 INJECTION INTRAVENOUS ONCE
Status: CANCELLED
Start: 2021-08-21 | End: 2021-08-21

## 2021-08-19 RX ORDER — DIPHENHYDRAMINE HCL 25 MG
25 CAPSULE ORAL ONCE
Status: CANCELLED
Start: 2021-08-25

## 2021-08-19 RX ORDER — EPINEPHRINE 1 MG/ML
0.3 INJECTION, SOLUTION INTRAMUSCULAR; SUBCUTANEOUS EVERY 5 MIN PRN
Status: CANCELLED | OUTPATIENT
Start: 2021-08-25

## 2021-08-19 RX ORDER — ACETAMINOPHEN 325 MG/1
650 TABLET ORAL ONCE
Status: CANCELLED
Start: 2021-08-25

## 2021-08-19 RX ORDER — ALBUTEROL SULFATE 90 UG/1
1-2 AEROSOL, METERED RESPIRATORY (INHALATION)
Status: CANCELLED
Start: 2021-09-08

## 2021-08-19 RX ORDER — MEPERIDINE HYDROCHLORIDE 25 MG/ML
25 INJECTION INTRAMUSCULAR; INTRAVENOUS; SUBCUTANEOUS EVERY 30 MIN PRN
Status: CANCELLED | OUTPATIENT
Start: 2021-08-22

## 2021-08-19 RX ORDER — ALBUTEROL SULFATE 0.83 MG/ML
2.5 SOLUTION RESPIRATORY (INHALATION)
Status: CANCELLED | OUTPATIENT
Start: 2021-08-20

## 2021-08-19 RX ORDER — DIPHENHYDRAMINE HCL 25 MG
25 CAPSULE ORAL EVERY 4 HOURS
Status: CANCELLED
Start: 2021-09-01

## 2021-08-19 RX ORDER — ACETAMINOPHEN 325 MG/1
650 TABLET ORAL ONCE
Status: CANCELLED
Start: 2021-08-21

## 2021-08-19 RX ORDER — EPINEPHRINE 1 MG/ML
0.3 INJECTION, SOLUTION INTRAMUSCULAR; SUBCUTANEOUS EVERY 5 MIN PRN
Status: CANCELLED | OUTPATIENT
Start: 2021-08-20

## 2021-08-19 RX ORDER — NALOXONE HYDROCHLORIDE 0.4 MG/ML
0.2 INJECTION, SOLUTION INTRAMUSCULAR; INTRAVENOUS; SUBCUTANEOUS
Status: CANCELLED | OUTPATIENT
Start: 2021-09-08

## 2021-08-19 RX ORDER — DIPHENHYDRAMINE HCL 25 MG
25 CAPSULE ORAL EVERY 4 HOURS
Status: CANCELLED
Start: 2021-09-08

## 2021-08-19 RX ORDER — ALBUTEROL SULFATE 90 UG/1
1-2 AEROSOL, METERED RESPIRATORY (INHALATION)
Status: CANCELLED
Start: 2021-08-21

## 2021-08-19 RX ORDER — ACETAMINOPHEN 325 MG/1
650 TABLET ORAL ONCE
Status: CANCELLED
Start: 2021-09-01

## 2021-08-19 RX ORDER — GRANISETRON HYDROCHLORIDE 1 MG/ML
1 INJECTION INTRAVENOUS ONCE
Status: CANCELLED
Start: 2021-08-22 | End: 2021-08-22

## 2021-08-19 RX ORDER — METHYLPREDNISOLONE SODIUM SUCCINATE 125 MG/2ML
125 INJECTION, POWDER, LYOPHILIZED, FOR SOLUTION INTRAMUSCULAR; INTRAVENOUS
Status: CANCELLED
Start: 2021-09-08

## 2021-08-19 RX ORDER — LORAZEPAM 2 MG/ML
0.5 INJECTION INTRAMUSCULAR EVERY 4 HOURS PRN
Status: CANCELLED
Start: 2021-08-22

## 2021-08-19 RX ORDER — DIPHENHYDRAMINE HCL 25 MG
50 CAPSULE ORAL ONCE
Status: CANCELLED
Start: 2021-08-21

## 2021-08-19 RX ORDER — MEPERIDINE HYDROCHLORIDE 25 MG/ML
25-50 INJECTION INTRAMUSCULAR; INTRAVENOUS; SUBCUTANEOUS
Status: CANCELLED | OUTPATIENT
Start: 2021-09-01 | End: 2021-09-02

## 2021-08-19 RX ORDER — HEPARIN SODIUM,PORCINE 10 UNIT/ML
5 VIAL (ML) INTRAVENOUS
Status: DISCONTINUED | OUTPATIENT
Start: 2021-08-19 | End: 2021-08-19 | Stop reason: HOSPADM

## 2021-08-19 RX ORDER — MEPERIDINE HYDROCHLORIDE 25 MG/ML
25 INJECTION INTRAMUSCULAR; INTRAVENOUS; SUBCUTANEOUS EVERY 30 MIN PRN
Status: CANCELLED | OUTPATIENT
Start: 2021-08-21

## 2021-08-19 RX ORDER — EPINEPHRINE 1 MG/ML
0.3 INJECTION, SOLUTION INTRAMUSCULAR; SUBCUTANEOUS EVERY 5 MIN PRN
Status: CANCELLED | OUTPATIENT
Start: 2021-09-08

## 2021-08-19 RX ORDER — NALOXONE HYDROCHLORIDE 0.4 MG/ML
0.2 INJECTION, SOLUTION INTRAMUSCULAR; INTRAVENOUS; SUBCUTANEOUS
Status: CANCELLED | OUTPATIENT
Start: 2021-08-21

## 2021-08-19 RX ORDER — DIPHENHYDRAMINE HCL 25 MG
25 CAPSULE ORAL ONCE
Status: CANCELLED
Start: 2021-09-01

## 2021-08-19 RX ORDER — DIPHENHYDRAMINE HYDROCHLORIDE 50 MG/ML
50 INJECTION INTRAMUSCULAR; INTRAVENOUS
Status: CANCELLED
Start: 2021-09-01

## 2021-08-19 RX ORDER — NALOXONE HYDROCHLORIDE 0.4 MG/ML
0.2 INJECTION, SOLUTION INTRAMUSCULAR; INTRAVENOUS; SUBCUTANEOUS
Status: CANCELLED | OUTPATIENT
Start: 2021-08-25

## 2021-08-19 RX ORDER — ALBUTEROL SULFATE 0.83 MG/ML
2.5 SOLUTION RESPIRATORY (INHALATION)
Status: CANCELLED | OUTPATIENT
Start: 2021-08-25

## 2021-08-19 RX ORDER — ALBUTEROL SULFATE 0.83 MG/ML
2.5 SOLUTION RESPIRATORY (INHALATION)
Status: CANCELLED | OUTPATIENT
Start: 2021-09-08

## 2021-08-19 RX ORDER — ALBUTEROL SULFATE 0.83 MG/ML
2.5 SOLUTION RESPIRATORY (INHALATION)
Status: CANCELLED | OUTPATIENT
Start: 2021-08-22

## 2021-08-19 RX ORDER — DIPHENHYDRAMINE HYDROCHLORIDE 50 MG/ML
50 INJECTION INTRAMUSCULAR; INTRAVENOUS
Status: CANCELLED
Start: 2021-09-08

## 2021-08-19 RX ORDER — NALOXONE HYDROCHLORIDE 0.4 MG/ML
0.2 INJECTION, SOLUTION INTRAMUSCULAR; INTRAVENOUS; SUBCUTANEOUS
Status: CANCELLED | OUTPATIENT
Start: 2021-09-01

## 2021-08-19 RX ORDER — MEPERIDINE HYDROCHLORIDE 25 MG/ML
25 INJECTION INTRAMUSCULAR; INTRAVENOUS; SUBCUTANEOUS EVERY 30 MIN PRN
Status: CANCELLED | OUTPATIENT
Start: 2021-08-25

## 2021-08-19 RX ORDER — DIPHENHYDRAMINE HYDROCHLORIDE 50 MG/ML
50 INJECTION INTRAMUSCULAR; INTRAVENOUS
Status: CANCELLED
Start: 2021-08-21

## 2021-08-19 RX ORDER — MEPERIDINE HYDROCHLORIDE 25 MG/ML
25 INJECTION INTRAMUSCULAR; INTRAVENOUS; SUBCUTANEOUS EVERY 30 MIN PRN
Status: CANCELLED | OUTPATIENT
Start: 2021-08-20

## 2021-08-19 RX ORDER — ALBUTEROL SULFATE 0.83 MG/ML
2.5 SOLUTION RESPIRATORY (INHALATION)
Status: CANCELLED | OUTPATIENT
Start: 2021-09-01

## 2021-08-19 RX ORDER — ALBUTEROL SULFATE 90 UG/1
1-2 AEROSOL, METERED RESPIRATORY (INHALATION)
Status: CANCELLED
Start: 2021-08-20

## 2021-08-19 RX ORDER — LORAZEPAM 2 MG/ML
0.5 INJECTION INTRAMUSCULAR EVERY 4 HOURS PRN
Status: CANCELLED
Start: 2021-08-21

## 2021-08-19 RX ORDER — EPINEPHRINE 1 MG/ML
0.3 INJECTION, SOLUTION INTRAMUSCULAR; SUBCUTANEOUS EVERY 5 MIN PRN
Status: CANCELLED | OUTPATIENT
Start: 2021-09-01

## 2021-08-19 RX ORDER — METHYLPREDNISOLONE SODIUM SUCCINATE 125 MG/2ML
125 INJECTION, POWDER, LYOPHILIZED, FOR SOLUTION INTRAMUSCULAR; INTRAVENOUS
Status: CANCELLED
Start: 2021-08-22

## 2021-08-19 RX ORDER — METHYLPREDNISOLONE SODIUM SUCCINATE 125 MG/2ML
125 INJECTION, POWDER, LYOPHILIZED, FOR SOLUTION INTRAMUSCULAR; INTRAVENOUS
Status: CANCELLED
Start: 2021-08-25

## 2021-08-19 RX ORDER — ALBUTEROL SULFATE 90 UG/1
1-2 AEROSOL, METERED RESPIRATORY (INHALATION)
Status: CANCELLED
Start: 2021-08-25

## 2021-08-19 RX ORDER — MEPERIDINE HYDROCHLORIDE 25 MG/ML
25-50 INJECTION INTRAMUSCULAR; INTRAVENOUS; SUBCUTANEOUS
Status: CANCELLED | OUTPATIENT
Start: 2021-08-25 | End: 2021-08-26

## 2021-08-19 RX ORDER — EPINEPHRINE 1 MG/ML
0.3 INJECTION, SOLUTION INTRAMUSCULAR; SUBCUTANEOUS EVERY 5 MIN PRN
Status: CANCELLED | OUTPATIENT
Start: 2021-08-21

## 2021-08-19 RX ORDER — ALBUTEROL SULFATE 90 UG/1
1-2 AEROSOL, METERED RESPIRATORY (INHALATION)
Status: CANCELLED
Start: 2021-08-22

## 2021-08-19 RX ORDER — ACETAMINOPHEN 325 MG/1
650 TABLET ORAL EVERY 4 HOURS
Status: CANCELLED
Start: 2021-08-25

## 2021-08-19 RX ORDER — ALBUTEROL SULFATE 0.83 MG/ML
2.5 SOLUTION RESPIRATORY (INHALATION)
Status: CANCELLED | OUTPATIENT
Start: 2021-08-21

## 2021-08-19 RX ORDER — NALOXONE HYDROCHLORIDE 0.4 MG/ML
0.2 INJECTION, SOLUTION INTRAMUSCULAR; INTRAVENOUS; SUBCUTANEOUS
Status: CANCELLED | OUTPATIENT
Start: 2021-08-20

## 2021-08-19 RX ORDER — LORAZEPAM 2 MG/ML
0.5 INJECTION INTRAMUSCULAR EVERY 4 HOURS PRN
Status: CANCELLED
Start: 2021-08-20

## 2021-08-19 RX ORDER — MEPERIDINE HYDROCHLORIDE 25 MG/ML
25 INJECTION INTRAMUSCULAR; INTRAVENOUS; SUBCUTANEOUS EVERY 30 MIN PRN
Status: CANCELLED | OUTPATIENT
Start: 2021-09-01

## 2021-08-19 RX ORDER — ACETAMINOPHEN 325 MG/1
650 TABLET ORAL EVERY 4 HOURS
Status: CANCELLED
Start: 2021-09-01

## 2021-08-19 RX ORDER — ALBUTEROL SULFATE 90 UG/1
1-2 AEROSOL, METERED RESPIRATORY (INHALATION)
Status: CANCELLED
Start: 2021-09-01

## 2021-08-19 RX ORDER — GRANISETRON HYDROCHLORIDE 1 MG/ML
1 INJECTION INTRAVENOUS ONCE
Status: CANCELLED
Start: 2021-08-20 | End: 2021-08-20

## 2021-08-19 RX ORDER — METHYLPREDNISOLONE SODIUM SUCCINATE 125 MG/2ML
125 INJECTION, POWDER, LYOPHILIZED, FOR SOLUTION INTRAMUSCULAR; INTRAVENOUS
Status: CANCELLED
Start: 2021-09-01

## 2021-08-19 RX ORDER — METHYLPREDNISOLONE SODIUM SUCCINATE 125 MG/2ML
125 INJECTION, POWDER, LYOPHILIZED, FOR SOLUTION INTRAMUSCULAR; INTRAVENOUS
Status: CANCELLED
Start: 2021-08-21

## 2021-08-19 RX ORDER — DIPHENHYDRAMINE HYDROCHLORIDE 50 MG/ML
50 INJECTION INTRAMUSCULAR; INTRAVENOUS
Status: CANCELLED
Start: 2021-08-22

## 2021-08-19 RX ORDER — MEPERIDINE HYDROCHLORIDE 25 MG/ML
25-50 INJECTION INTRAMUSCULAR; INTRAVENOUS; SUBCUTANEOUS
Status: CANCELLED | OUTPATIENT
Start: 2021-09-08 | End: 2021-09-09

## 2021-08-19 RX ORDER — EPINEPHRINE 1 MG/ML
0.3 INJECTION, SOLUTION INTRAMUSCULAR; SUBCUTANEOUS EVERY 5 MIN PRN
Status: CANCELLED | OUTPATIENT
Start: 2021-08-22

## 2021-08-19 RX ORDER — METHYLPREDNISOLONE SODIUM SUCCINATE 125 MG/2ML
125 INJECTION, POWDER, LYOPHILIZED, FOR SOLUTION INTRAMUSCULAR; INTRAVENOUS
Status: CANCELLED
Start: 2021-08-20

## 2021-08-19 RX ORDER — ALLOPURINOL 300 MG/1
300 TABLET ORAL DAILY
Qty: 21 TABLET | Refills: 0 | Status: SHIPPED | OUTPATIENT
Start: 2021-08-19 | End: 2021-08-20

## 2021-08-19 RX ORDER — DIPHENHYDRAMINE HYDROCHLORIDE 50 MG/ML
50 INJECTION INTRAMUSCULAR; INTRAVENOUS
Status: CANCELLED
Start: 2021-08-20

## 2021-08-19 RX ORDER — NALOXONE HYDROCHLORIDE 0.4 MG/ML
0.2 INJECTION, SOLUTION INTRAMUSCULAR; INTRAVENOUS; SUBCUTANEOUS
Status: CANCELLED | OUTPATIENT
Start: 2021-08-22

## 2021-08-19 RX ORDER — ACETAMINOPHEN 325 MG/1
650 TABLET ORAL ONCE
Status: CANCELLED
Start: 2021-09-08

## 2021-08-19 RX ORDER — DIPHENHYDRAMINE HCL 25 MG
25 CAPSULE ORAL EVERY 4 HOURS
Status: CANCELLED
Start: 2021-08-25

## 2021-08-19 RX ORDER — MEPERIDINE HYDROCHLORIDE 25 MG/ML
25 INJECTION INTRAMUSCULAR; INTRAVENOUS; SUBCUTANEOUS EVERY 30 MIN PRN
Status: CANCELLED | OUTPATIENT
Start: 2021-09-08

## 2021-08-19 RX ADMIN — Medication 5 ML: at 14:29

## 2021-08-19 ASSESSMENT — PAIN SCALES - GENERAL: PAINLEVEL: NO PAIN (1)

## 2021-08-19 NOTE — NURSING NOTE
Chief Complaint   Patient presents with     Blood Draw     labs drawn from picc by rn.  vs taken     Labs drawn from picc by rn.  Good blood return noted in both lumens.  Both lumens flushed with NS and heparin.  Pt tolerated well.  VS taken.  Pt checked in for next appt.    Lexus Byrd RN

## 2021-08-19 NOTE — LETTER
8/19/2021         RE: Juvenal Blake  [address] [place] [address]        Dear Colleague,    Thank you for referring your patient, Juvenal Blake, to the [facility]. Please see a copy of my visit note below.    BMT Clinic Note      Juvenal Blake is a 55 year old male PMH significant for refractory NHL, undergoing  NK infusion. Admitted with rituxan (biosimilar) infusion reaction 6/24 and remained admitted through completion of lymphodepleting chemo and  NK cell infusion.  Currently day +52 after initiation cycle 1 of      HPI: The patient came here accompanied by his wife to discuss proceeding with the second cycle of .  He is overall doing very well with good oral intake and energy.  He says his stomach feels a little weird when he goes through a hole on the road but otherwise has no abdominal pain.  He has no upper or lower GI symptoms.  He noticed no adenopathy.  He has no B symptoms.  The tooth that broke off its the right upper incisor has no evidence of infection.  He was seen by dentist this week that confirms that there is no infection but the repair for this tooth will be in about 2 months.  He has no worsening shortness of breath or cough.  He has no clinical signs of infection.      ROS:  10 point ROS negative except as above.      Physical Exam:   /63 (BP Location: Left arm, Patient Position: Sitting, Cuff Size: Adult Large)   Pulse 66   Temp 97.5  F (36.4  C) (Oral)   Resp 16   Wt 104.7 kg (230 lb 14.4 oz)   SpO2 100%   BMI 35.61 kg/m      Wt Readings from Last 4 Encounters:   08/17/21 106.4 kg (234 lb 8 oz)   08/10/21 106.4 kg (234 lb 9.6 oz)   08/03/21 107 kg (236 lb)   07/29/21 106.3 kg (234 lb 6.4 oz)     General: NAD, alert oriented  Eyes: sclera anicteric  Mouth: OP moist without lesions. R front tooth/central incisor chipped down to base per pt but has flipper in place now. No gum  erythema/edema  Lungs: CTAB  Cardiovascular: RRR, no m/r/g  Abdominal/Rectal: normoactive BS, protuberant, no palpable organomegalies or masses.  Lymphatics: No lower extremity edema today.    Skin: no rashes  LAD:  No neck, supraclavicular or axillary LN palpated  Neuro: Alert to conversation. Answering questions appropriately  Access: R arm PICC NT -dressing changed 8/16  Port-A-Cath on the right upper chest with no evidence of infection.    ICANS: 10/10 (8/16/21)- pt sentence log is in research folder in the [facility]  -sentence looked good     ECOG 1-8/16/2021    Labs:  Lab Results   Component Value Date    WBC 3.7 (L) 08/19/2021    ANEU 0.5 (L) 07/08/2021    HGB 12.2 (L) 08/19/2021    HCT 35.5 (L) 08/19/2021     (L) 08/19/2021     08/19/2021    POTASSIUM 3.9 08/19/2021    CHLORIDE 110 (H) 08/19/2021    CO2 27 08/19/2021     (H) 08/19/2021    BUN 11 08/19/2021    CR 0.99 08/19/2021    MAG 2.2 08/17/2021    INR 1.07 06/28/2021    BILITOTAL 0.7 08/19/2021    AST 36 08/19/2021    ALT 87 (H) 08/19/2021    ALKPHOS 87 08/19/2021    PROTTOTAL 6.4 (L) 08/19/2021    ALBUMIN 4.1 08/19/2021       I have assessed all abnormal lab values for their clinical significance and any values considered clinically significant have been addressed in the assessment and plan.         Assessment and Plan:   Juvenal Blake is a 55 year old male PMH significant for refractory NHL, undergoing  NK infusion. Admitted with rituxan (biosimilar) infusion reaction 6/24 and remained admitted through completion of lymphodepleting chemo and  NK cell infusion.  Currently day +52    #Follicular Lymphoma: The patient is doing very well and had a good response to initial therapy.  He did have a reaction to the bio similar rituximab and that will not be used again.  Today we discussed the pros and cons of proceeding with a second cycle of .  In particular, I explained that he does not have any active infections but that he  is tooth that is broken is a potential site of trouble.  However it will be 2 months before he is able to fix that and delaying therapy that far is not in his best interest.  He meets criteria to proceed with therapy and he is agreeable with that plan.  He will require antibiotic prophylaxis if and when neutropenic.  The patient and his wife active which interest questions and they both agree with the plan to proceed.  - Stopped allopurinol for now (will restart with second cycle)       #HEME: Good blood counts today ready to start lymphodepleting chemo tomorrow.  Keep hgb >7g/dL, plt>10,000     #CV: Cardiology cleared him to proceed with no further testing.  The CT angiogram showed no lesion requiring stenting or bypass.    #: The patient has some nocturia but otherwise no other active issues.  Initial radical prostatectomy and bilateral lymph node dissection. November 15, 2017. Prostatic adenocarcinoma [place] 4+3 = 7 with tertiary pattern 5. Tumor involves 45% of total surface area. Positive for extensive extraprostatic extension. Positive for bilateral seminal vesicle invasion. Multiple margins positive. Lymphovascular invasion not identified. Perineural invasion was noted. Lymph nodes negative. 3 were examined (2 right obturator and 1 left obturator). Completed radiation to the prostate fossa and pelvic lymph nodes at [facility] early May 2018. He received 4500 cGy in 25 fractions. He then had 2340 cGy boost in 13 fractions to the prostatic fossa, for a total dose of 6240 cGy in 38 treatment fractions delivered over 54 days. He did not receive hormonal therapy:       #ID: He will continue with the current prophylaxis and will have to be put on antibacterial prophylaxis when he becomes neutropenic.  - prophy: ACV, Bactrim- he has not been taking bactrim- he says he was told to stop sometime-inquired if this is for current protocol or just 1 year post NAM NK therapy? Research RN said there is no  guidance and pharmacist said they think this is 6 months so will not refill   - hx C diff colitis: finished vancomycin 6/23, treated for 7 days. Ppx bid vanco while neutropenic, now off.      #FEN/Renal: Ordered allopurinol 300 mg daily and sent it to the pharmacy.  Good oral intake at this time.  - Creat wnl     # GI: No active GI issues.  Fullness to left groin: Now resolved  Abdominal Pain secondary to cancer: oxycodone prn.    Transaminitis: suspect related to cancer. Had improved/resolved, then up slightly again 8/10. Limit Tylenol, etoh and monitor.In normal range today     # dental: He was seen by a dentist as described above.  The identified no infection but no repair is possible for at least the next 2 months because of scheduling.  There is a possible increased risk of infection at that site and therefore he will need prophylactic antibiotics were neutropenic.     Final Plan: In agreement with the patient and his wife will proceed with a second cycle of  starting tomorrow 8/20/2021.  He will get chemotherapy in the clinic over the weekend and the infusion of the first dose of the second cycle  on Wednesday in the phase 1 unit.    30 minutes spent on the date of the encounter doing chart review, review of test results, interpretation of tests, patient visit, documentation and discussion with other provider(s)       Scott Zavala MD on 8/19/2021 at 3:15 PM           Again, thank you for allowing me to participate in the care of your patient.        Sincerely,        BMT DOM

## 2021-08-19 NOTE — NURSING NOTE
"Oncology Rooming Note    August 19, 2021 2:42 PM   Juvenal Blake is a 55 year old male who presents for:    Chief Complaint   Patient presents with     Blood Draw     labs drawn from picc by rn.  vs taken     Oncology Clinic Visit     Follicular lymphoma (H)     Initial Vitals: /63 (BP Location: Left arm, Patient Position: Sitting, Cuff Size: Adult Large)   Pulse 66   Temp 97.5  F (36.4  C) (Oral)   Resp 16   Wt 104.7 kg (230 lb 14.4 oz)   SpO2 100%   BMI 35.61 kg/m   Estimated body mass index is 35.61 kg/m  as calculated from the following:    Height as of 8/17/21: 1.715 m (5' 7.52\").    Weight as of this encounter: 104.7 kg (230 lb 14.4 oz). Body surface area is 2.23 meters squared.  No Pain (1) Comment: Data Unavailable   No LMP for male patient.  Allergies reviewed: Yes  Medications reviewed: Yes    Medications: Medication refills not needed today.  Pharmacy name entered into [employer]: [id number] - [place], [place] - [address] AT [facility]    Clinical concerns: No new concerns.      Rox Ward, [facility]          \    "

## 2021-08-19 NOTE — PROGRESS NOTES
BMT Clinic Note      Juvenal Blake is a 55 year old male PMH significant for refractory NHL, undergoing  NK infusion. Admitted with rituxan (biosimilar) infusion reaction 6/24 and remained admitted through completion of lymphodepleting chemo and  NK cell infusion.  Currently day +52 after initiation cycle 1 of      HPI: The patient came here accompanied by his wife to discuss proceeding with the second cycle of .  He is overall doing very well with good oral intake and energy.  He says his stomach feels a little weird when he goes through a hole on the road but otherwise has no abdominal pain.  He has no upper or lower GI symptoms.  He noticed no adenopathy.  He has no B symptoms.  The tooth that broke off its the right upper incisor has no evidence of infection.  He was seen by dentist this week that confirms that there is no infection but the repair for this tooth will be in about 2 months.  He has no worsening shortness of breath or cough.  He has no clinical signs of infection.      ROS:  10 point ROS negative except as above.      Physical Exam:   /63 (BP Location: Left arm, Patient Position: Sitting, Cuff Size: Adult Large)   Pulse 66   Temp 97.5  F (36.4  C) (Oral)   Resp 16   Wt 104.7 kg (230 lb 14.4 oz)   SpO2 100%   BMI 35.61 kg/m      Wt Readings from Last 4 Encounters:   08/17/21 106.4 kg (234 lb 8 oz)   08/10/21 106.4 kg (234 lb 9.6 oz)   08/03/21 107 kg (236 lb)   07/29/21 106.3 kg (234 lb 6.4 oz)     General: NAD, alert oriented  Eyes: sclera anicteric  Mouth: OP moist without lesions. R front tooth/central incisor chipped down to base per pt but has flipper in place now. No gum erythema/edema  Lungs: CTAB  Cardiovascular: RRR, no m/r/g  Abdominal/Rectal: normoactive BS, protuberant, no palpable organomegalies or masses.  Lymphatics: No lower extremity edema today.    Skin: no rashes  LAD:  No neck, supraclavicular or axillary LN palpated  Neuro: Alert to  conversation. Answering questions appropriately  Access: R arm PICC NT -dressing changed 8/16  Port-A-Cath on the right upper chest with no evidence of infection.    ICANS: 10/10 (8/16/21)- pt sentence log is in research folder in the [facility]  -sentence looked good     ECOG 1-8/16/2021    Labs:  Lab Results   Component Value Date    WBC 3.7 (L) 08/19/2021    ANEU 0.5 (L) 07/08/2021    HGB 12.2 (L) 08/19/2021    HCT 35.5 (L) 08/19/2021     (L) 08/19/2021     08/19/2021    POTASSIUM 3.9 08/19/2021    CHLORIDE 110 (H) 08/19/2021    CO2 27 08/19/2021     (H) 08/19/2021    BUN 11 08/19/2021    CR 0.99 08/19/2021    MAG 2.2 08/17/2021    INR 1.07 06/28/2021    BILITOTAL 0.7 08/19/2021    AST 36 08/19/2021    ALT 87 (H) 08/19/2021    ALKPHOS 87 08/19/2021    PROTTOTAL 6.4 (L) 08/19/2021    ALBUMIN 4.1 08/19/2021       I have assessed all abnormal lab values for their clinical significance and any values considered clinically significant have been addressed in the assessment and plan.         Assessment and Plan:   Juvenal Blake is a 55 year old male PMH significant for refractory NHL, undergoing  NK infusion. Admitted with rituxan (biosimilar) infusion reaction 6/24 and remained admitted through completion of lymphodepleting chemo and  NK cell infusion.  Currently day +52    #Follicular Lymphoma: The patient is doing very well and had a good response to initial therapy.  He did have a reaction to the bio similar rituximab and that will not be used again.  Today we discussed the pros and cons of proceeding with a second cycle of .  In particular, I explained that he does not have any active infections but that he is tooth that is broken is a potential site of trouble.  However it will be 2 months before he is able to fix that and delaying therapy that far is not in his best interest.  He meets criteria to proceed with therapy and he is agreeable with that plan.  He will require antibiotic  prophylaxis if and when neutropenic.  The patient and his wife active which interest questions and they both agree with the plan to proceed.  - Stopped allopurinol for now (will restart with second cycle)       #HEME: Good blood counts today ready to start lymphodepleting chemo tomorrow.  Keep hgb >7g/dL, plt>10,000     #CV: Cardiology cleared him to proceed with no further testing.  The CT angiogram showed no lesion requiring stenting or bypass.    #: The patient has some nocturia but otherwise no other active issues.  Initial radical prostatectomy and bilateral lymph node dissection. November 15, 2017. Prostatic adenocarcinoma [place] 4+3 = 7 with tertiary pattern 5. Tumor involves 45% of total surface area. Positive for extensive extraprostatic extension. Positive for bilateral seminal vesicle invasion. Multiple margins positive. Lymphovascular invasion not identified. Perineural invasion was noted. Lymph nodes negative. 3 were examined (2 right obturator and 1 left obturator). Completed radiation to the prostate fossa and pelvic lymph nodes at [facility] early May 2018. He received 4500 cGy in 25 fractions. He then had 2340 cGy boost in 13 fractions to the prostatic fossa, for a total dose of 6240 cGy in 38 treatment fractions delivered over 54 days. He did not receive hormonal therapy:       #ID: He will continue with the current prophylaxis and will have to be put on antibacterial prophylaxis when he becomes neutropenic.  - prophy: ACV, Bactrim- he has not been taking bactrim- he says he was told to stop sometime-inquired if this is for current protocol or just 1 year post NAM NK therapy? Research RN said there is no guidance and pharmacist said they think this is 6 months so will not refill   - hx C diff colitis: finished vancomycin 6/23, treated for 7 days. Ppx bid vanco while neutropenic, now off.      #FEN/Renal: Ordered allopurinol 300 mg daily and sent it to the pharmacy.  Good oral intake at  this time.  - Creat wnl     # GI: No active GI issues.  Fullness to left groin: Now resolved  Abdominal Pain secondary to cancer: oxycodone prn.    Transaminitis: suspect related to cancer. Had improved/resolved, then up slightly again 8/10. Limit Tylenol, etoh and monitor.In normal range today     # dental: He was seen by a dentist as described above.  The identified no infection but no repair is possible for at least the next 2 months because of scheduling.  There is a possible increased risk of infection at that site and therefore he will need prophylactic antibiotics were neutropenic.     Final Plan: In agreement with the patient and his wife will proceed with a second cycle of  starting tomorrow 8/20/2021.  He will get chemotherapy in the clinic over the weekend and the infusion of the first dose of the second cycle  on Wednesday in the phase 1 unit.    30 minutes spent on the date of the encounter doing chart review, review of test results, interpretation of tests, patient visit, documentation and discussion with other provider(s)       Scott Zavala MD on 8/19/2021 at 3:15 PM

## 2021-08-20 ENCOUNTER — ONCOLOGY VISIT (OUTPATIENT)
Dept: TRANSPLANT | Facility: CLINIC | Age: 56
End: 2021-08-20
Attending: NURSE PRACTITIONER
Payer: COMMERCIAL

## 2021-08-20 ENCOUNTER — APPOINTMENT (OUTPATIENT)
Dept: LAB | Facility: CLINIC | Age: 56
End: 2021-08-20
Attending: NURSE PRACTITIONER
Payer: COMMERCIAL

## 2021-08-20 ENCOUNTER — RESEARCH ENCOUNTER (OUTPATIENT)
Dept: TRANSPLANT | Facility: CLINIC | Age: 56
End: 2021-08-20

## 2021-08-20 VITALS
WEIGHT: 233.4 LBS | HEART RATE: 72 BPM | DIASTOLIC BLOOD PRESSURE: 78 MMHG | SYSTOLIC BLOOD PRESSURE: 129 MMHG | TEMPERATURE: 97.8 F | BODY MASS INDEX: 35.99 KG/M2 | OXYGEN SATURATION: 98 % | RESPIRATION RATE: 18 BRPM

## 2021-08-20 DIAGNOSIS — C82.90 FOLLICULAR LYMPHOMA (H): Primary | ICD-10-CM

## 2021-08-20 DIAGNOSIS — C82.08 FOLLICULAR LYMPHOMA GRADE I, LYMPH NODES OF MULTIPLE SITES (H): ICD-10-CM

## 2021-08-20 DIAGNOSIS — C82.08 FOLLICULAR LYMPHOMA GRADE I, LYMPH NODES OF MULTIPLE SITES (H): Primary | ICD-10-CM

## 2021-08-20 LAB
ALBUMIN SERPL-MCNC: 3.9 G/DL (ref 3.4–5)
ALP SERPL-CCNC: 92 U/L (ref 40–150)
ALT SERPL W P-5'-P-CCNC: 83 U/L (ref 0–70)
ANION GAP SERPL CALCULATED.3IONS-SCNC: 7 MMOL/L (ref 3–14)
AST SERPL W P-5'-P-CCNC: 37 U/L (ref 0–45)
BASOPHILS # BLD AUTO: 0 10E3/UL (ref 0–0.2)
BASOPHILS NFR BLD AUTO: 1 %
BILIRUB DIRECT SERPL-MCNC: 0.2 MG/DL (ref 0–0.2)
BILIRUB SERPL-MCNC: 0.5 MG/DL (ref 0.2–1.3)
BUN SERPL-MCNC: 12 MG/DL (ref 7–30)
CALCIUM SERPL-MCNC: 8.7 MG/DL (ref 8.5–10.1)
CHLORIDE BLD-SCNC: 111 MMOL/L (ref 94–109)
CO2 SERPL-SCNC: 26 MMOL/L (ref 20–32)
CREAT SERPL-MCNC: 1.01 MG/DL (ref 0.66–1.25)
EOSINOPHIL # BLD AUTO: 0.2 10E3/UL (ref 0–0.7)
EOSINOPHIL NFR BLD AUTO: 5 %
ERYTHROCYTE [DISTWIDTH] IN BLOOD BY AUTOMATED COUNT: 14.7 % (ref 10–15)
GFR SERPL CREATININE-BSD FRML MDRD: 82 ML/MIN/1.73M2
GLUCOSE BLD-MCNC: 126 MG/DL (ref 70–99)
HCT VFR BLD AUTO: 35 % (ref 40–53)
HGB BLD-MCNC: 12 G/DL (ref 13.3–17.7)
IMM GRANULOCYTES # BLD: 0.1 10E3/UL
IMM GRANULOCYTES NFR BLD: 2 %
INTERPRETATION: NORMAL
LDH SERPL L TO P-CCNC: 195 U/L (ref 85–227)
LYMPHOCYTES # BLD AUTO: 0.9 10E3/UL (ref 0.8–5.3)
LYMPHOCYTES NFR BLD AUTO: 26 %
MAGNESIUM SERPL-MCNC: 2.2 MG/DL (ref 1.6–2.3)
MCH RBC QN AUTO: 32.6 PG (ref 26.5–33)
MCHC RBC AUTO-ENTMCNC: 34.3 G/DL (ref 31.5–36.5)
MCV RBC AUTO: 95 FL (ref 78–100)
MONOCYTES # BLD AUTO: 0.4 10E3/UL (ref 0–1.3)
MONOCYTES NFR BLD AUTO: 12 %
NEUTROPHILS # BLD AUTO: 1.8 10E3/UL (ref 1.6–8.3)
NEUTROPHILS NFR BLD AUTO: 54 %
NRBC # BLD AUTO: 0 10E3/UL
NRBC BLD AUTO-RTO: 0 /100
PHOSPHATE SERPL-MCNC: 3 MG/DL (ref 2.5–4.5)
PLATELET # BLD AUTO: 112 10E3/UL (ref 150–450)
POTASSIUM BLD-SCNC: 4.1 MMOL/L (ref 3.4–5.3)
PROT SERPL-MCNC: 6.2 G/DL (ref 6.8–8.8)
RBC # BLD AUTO: 3.68 10E6/UL (ref 4.4–5.9)
SODIUM SERPL-SCNC: 144 MMOL/L (ref 133–144)
WBC # BLD AUTO: 3.3 10E3/UL (ref 4–11)

## 2021-08-20 PROCEDURE — 96415 CHEMO IV INFUSION ADDL HR: CPT

## 2021-08-20 PROCEDURE — 36592 COLLECT BLOOD FROM PICC: CPT | Performed by: INTERNAL MEDICINE

## 2021-08-20 PROCEDURE — 83735 ASSAY OF MAGNESIUM: CPT | Performed by: INTERNAL MEDICINE

## 2021-08-20 PROCEDURE — 84100 ASSAY OF PHOSPHORUS: CPT | Performed by: INTERNAL MEDICINE

## 2021-08-20 PROCEDURE — 250N000011 HC RX IP 250 OP 636: Mod: JW | Performed by: INTERNAL MEDICINE

## 2021-08-20 PROCEDURE — 96417 CHEMO IV INFUS EACH ADDL SEQ: CPT

## 2021-08-20 PROCEDURE — 82248 BILIRUBIN DIRECT: CPT | Performed by: INTERNAL MEDICINE

## 2021-08-20 PROCEDURE — 96413 CHEMO IV INFUSION 1 HR: CPT

## 2021-08-20 PROCEDURE — 85025 COMPLETE CBC W/AUTO DIFF WBC: CPT

## 2021-08-20 PROCEDURE — 99214 OFFICE O/P EST MOD 30 MIN: CPT

## 2021-08-20 PROCEDURE — 83615 LACTATE (LD) (LDH) ENZYME: CPT | Performed by: INTERNAL MEDICINE

## 2021-08-20 PROCEDURE — 96361 HYDRATE IV INFUSION ADD-ON: CPT

## 2021-08-20 PROCEDURE — 258N000003 HC RX IP 258 OP 636: Performed by: INTERNAL MEDICINE

## 2021-08-20 PROCEDURE — 80053 COMPREHEN METABOLIC PANEL: CPT

## 2021-08-20 PROCEDURE — G0463 HOSPITAL OUTPT CLINIC VISIT: HCPCS | Mod: 25

## 2021-08-20 PROCEDURE — 96375 TX/PRO/DX INJ NEW DRUG ADDON: CPT

## 2021-08-20 RX ORDER — ALLOPURINOL 300 MG/1
300 TABLET ORAL DAILY
COMMUNITY
Start: 2021-08-20 | End: 2021-11-19

## 2021-08-20 RX ORDER — GRANISETRON HYDROCHLORIDE 1 MG/ML
1 INJECTION INTRAVENOUS ONCE
Status: COMPLETED | OUTPATIENT
Start: 2021-08-20 | End: 2021-08-20

## 2021-08-20 RX ADMIN — SODIUM CHLORIDE 500 ML: 9 INJECTION, SOLUTION INTRAVENOUS at 08:36

## 2021-08-20 RX ADMIN — FLUDARABINE PHOSPHATE 71 MG: 25 INJECTION, SOLUTION INTRAVENOUS at 11:38

## 2021-08-20 RX ADMIN — GRANISETRON HYDROCHLORIDE 1 MG: 1 INJECTION INTRAVENOUS at 08:35

## 2021-08-20 RX ADMIN — CYCLOPHOSPHAMIDE 1060 MG: 1 INJECTION, POWDER, FOR SOLUTION INTRAVENOUS; ORAL at 09:38

## 2021-08-20 RX ADMIN — SODIUM CHLORIDE 500 ML: 9 INJECTION, SOLUTION INTRAVENOUS at 11:46

## 2021-08-20 NOTE — PROGRESS NOTES
BMT Clinic Note      Juvenal Blake is a 55 year old male PMH significant for refractory NHL, undergoing  NK infusion. Admitted with rituxan (biosimilar) infusion reaction 6/24 and remained admitted through completion of lymphodepleting chemo and  NK cell infusion.  Currently day +53 after initiation cycle 1 of . Now starting LD chemo for cycle 2 .     HPI: Here for fludarabine/Cytoxan. No n/v. Some diarrhea this morning - says intermittent loose stools 1-2x/week. No abd pain. No fevers. No dental/oral pain. Some LE edema slightly increased again.     ROS:  10 point ROS negative except as above.      Physical Exam:   VSS  General: NAD, alert oriented  Eyes: sclera anicteric  Mouth: OP moist without lesions. R front tooth/central incisor chipped down to base per pt but has flipper in place now. No gum erythema/edema  Lungs: CTAB  Cardiovascular: RRR, no m/r/g  Abdominal/Rectal: normoactive BS, protuberant, no palpable organomegalies or masses.  Lymphatics: trace - 1+ LE edema bilaterally, wearing compression stockings.    Skin: no rash; large fading bruise to RUE around PICC  Neuro: non-focal  Access: R arm PICC NT -dressing changed 8/16  Port-A-Cath on the right upper chest not accessed.    ICANS: 10/10 (8/20/21)- pt sentence log is in [place] [facility] again on 8/25.     ECOG 1     Labs:  Lab Results   Component Value Date    WBC 3.3 (L) 08/20/2021    ANEU 0.5 (L) 07/08/2021    HGB 12.0 (L) 08/20/2021    HCT 35.0 (L) 08/20/2021     (L) 08/20/2021     08/20/2021    POTASSIUM 4.1 08/20/2021    CHLORIDE 111 (H) 08/20/2021    CO2 26 08/20/2021     (H) 08/20/2021    BUN 12 08/20/2021    CR 1.01 08/20/2021    MAG 2.2 08/20/2021    INR 1.07 06/28/2021    BILITOTAL 0.5 08/20/2021    AST 37 08/20/2021    ALT 83 (H) 08/20/2021    ALKPHOS 92 08/20/2021    PROTTOTAL 6.2 (L) 08/20/2021    ALBUMIN 3.9 08/20/2021       I have assessed all abnormal lab values for their  clinical significance and any values considered clinically significant have been addressed in the assessment and plan.         Assessment and Plan:   Juvenal Blake is a 55 year old male PMH significant for refractory NHL, undergoing  NK infusion. Admitted with rituxan (biosimilar) infusion reaction 6/24 and remained admitted through completion of lymphodepleting chemo and  NK cell infusion.  Currently day +53 after initiation cycle 1 of . Now starting LD chemo for cycle 2 .    #Follicular Lymphoma: The patient is doing very well and had a good response to initial therapy.  He did have a reaction to the bio similar rituximab and that will not be used again.  Dr. Zavala discussed the pros and cons of proceeding with a second cycle of .  In particular, that he does not have any active infections but that he is tooth that is broken is a potential site of trouble.  However it will be 2 months before he is able to fix that and delaying therapy that far is not in his best interest.  He meets criteria to proceed with therapy and he is agreeable with that plan.  He will require antibiotic prophylaxis if and when neutropenic.      - uric acid 4/4 (8/17); resume allopurinol 8/20 with 2nd cycle chemo/     #HEME: Counts stable. No transfusions.  Keep hgb >7g/dL, plt>10,000     #CV: Cardiology cleared him to proceed with no further testing.  The CT angiogram showed no lesion requiring stenting or bypass.    #: The patient has some nocturia but otherwise no other active issues.  Initial radical prostatectomy and bilateral lymph node dissection. November 15, 2017. Prostatic adenocarcinoma Maricel 4+3 = 7 with tertiary pattern 5. Tumor involves 45% of total surface area. Positive for extensive extraprostatic extension. Positive for bilateral seminal vesicle invasion. Multiple margins positive. Lymphovascular invasion not identified. Perineural invasion was noted. Lymph nodes negative. 3 were  examined (2 right obturator and 1 left obturator). Completed radiation to the prostate fossa and pelvic lymph nodes at [facility] early May 2018. He received 4500 cGy in 25 fractions. He then had 2340 cGy boost in 13 fractions to the prostatic fossa, for a total dose of 6240 cGy in 38 treatment fractions delivered over 54 days. He did not receive hormonal therapy:       #ID: He will continue with the current prophylaxis and will have to be put on antibacterial prophylaxis when he becomes neutropenic.  - prophy: ACV, Bactrim- he has not been taking bactrim- he says he was told to stop sometime-inquired if this is for current protocol or just 1 year post NAM NK therapy? Research RN said there is no guidance and pharmacist said they think this is 6 months so will not refill   - hx C diff colitis: finished vancomycin 6/23, treated for 7 days. Ppx bid vanco while neutropenic, now off.      #FEN/Renal: Ordered allopurinol 300 mg daily and sent it to the pharmacy.  Good oral intake at this time.  - Creat wnl     # GI: some loose stools intermittently- monitor.  Fullness to left groin: Now resolved  Abdominal Pain secondary to cancer: oxycodone prn.    Transaminitis: suspect related to cancer. Limit Tylenol, etoh and monitor. Mild ALT elevation of 83 (8/20); follow.    # dental: He was seen by a dentist as described above.  The identified no infection but no repair is possible for at least the next 2 months because of scheduling.  There is a possible increased risk of infection at that site and therefore he will need prophylactic antibiotics were neutropenic.     Final Plan:  Fludarabine/Cytoxan today and Sat/Sun  Start allopurinol 300mg/d  Clinic follow-up then admit to 5C Phase 1 unit for 2nd cycle infusion  Wed 8/25.     30 minutes spent on the date of the encounter doing chart review, review of test results, interpretation of tests, patient visit, documentation and discussion with other provider(s)        JAEL LeungC  [phone]

## 2021-08-20 NOTE — PROGRESS NOTES
[id number]: Study Visit Note   Subject name: Juvenal Blake     Visit: Cycle 2 Day -5    Did the study visit occur within the appropriate window allowed by the protocol? yes      Since the last study visit, He has been doing well. No complaints today. Ready to start cycle 2 after his discussion with Dr. Zavala on 8/19.     Met with NAYA Cason to discuss plan and provided writing tool for [facility] assessment.     I have personally interviewed Juvenal Blake and reviewed his medical record for adverse events and concomitant medications and these have been recorded on the corresponding logs in Juvenal Blake's research file.     Juvenal Blake was given the opportunity to ask any trial related questions.  Please see provider progress note for physical exam and other clinical information. Labs were reviewed - any significant lab values were addressed and reviewed.    Emilia Castillo RN

## 2021-08-20 NOTE — LETTER
8/20/2021         RE: Juvenal Blake  [address] [place] [address]        Dear Colleague,    Thank you for referring your patient, Juvenal Blake, to the [facility]PL[facility] PROGRAM [place]. Please see a copy of my visit note below.    Infusion Nursing Note:  Juvenal Blake presents today for scheduled infusion.    Patient seen by provider today: Yes: Jossie   present during visit today: Not Applicable.    Note: Patient received cytoxan and fludarabine. Patient was premedicated with kytril. Pre and post flush given-confirmed with pharmacist that it is acceptable to run flushes over 1 hour pre and post. Blood return noted before and after infusion.      Intravenous Access:  PICC.    Treatment Conditions:  Results reviewed, labs MET treatment parameters, ok to proceed with treatment.      Post Infusion Assessment:  Patient tolerated infusion without incident.       Discharge Plan:   Patient and/or family verbalized understanding of discharge instructions and all questions answered.      Ludivina Aquino RN                          Again, thank you for allowing me to participate in the care of your patient.        Sincerely,        [facility]

## 2021-08-20 NOTE — LETTER
8/20/2021         RE: Juvenal Blake  [address] [place] [address]        Dear Colleague,    Thank you for referring your patient, Juvenal Blake, to the [facility]LOOD AND MARR[facility]ANSPLANT PROGRAM [place]. Please see a copy of my visit note below.    BMT Clinic Note      Juvenal Blake is a 55 year old male PMH significant for refractory NHL, undergoing  NK infusion. Admitted with rituxan (biosimilar) infusion reaction 6/24 and remained admitted through completion of lymphodepleting chemo and  NK cell infusion.  Currently day +53 after initiation cycle 1 of . Now starting LD chemo for cycle 2 .     HPI: Here for fludarabine/Cytoxan. No n/v. Some diarrhea this morning - says intermittent loose stools 1-2x/week. No abd pain. No fevers. No dental/oral pain. Some LE edema slightly increased again.     ROS:  10 point ROS negative except as above.      Physical Exam:   VSS  General: NAD, alert oriented  Eyes: sclera anicteric  Mouth: OP moist without lesions. R front tooth/central incisor chipped down to base per pt but has flipper in place now. No gum erythema/edema  Lungs: CTAB  Cardiovascular: RRR, no m/r/g  Abdominal/Rectal: normoactive BS, protuberant, no palpable organomegalies or masses.  Lymphatics: trace - 1+ LE edema bilaterally, wearing compression stockings.    Skin: no rash; large fading bruise to RUE around PICC  Neuro: non-focal  Access: R arm PICC NT -dressing changed 8/16  Port-A-Cath on the right upper chest not accessed.    ICANS: 10/10 (8/20/21)- pt sentence log is in research folder in the [facility]. Due again on 8/25.     ECOG 1     Labs:  Lab Results   Component Value Date    WBC 3.3 (L) 08/20/2021    ANEU 0.5 (L) 07/08/2021    HGB 12.0 (L) 08/20/2021    HCT 35.0 (L) 08/20/2021     (L) 08/20/2021     08/20/2021    POTASSIUM 4.1 08/20/2021    CHLORIDE 111 (H) 08/20/2021    CO2 26 08/20/2021     (H) 08/20/2021    BUN 12 08/20/2021    CR  1.01 08/20/2021    MAG 2.2 08/20/2021    INR 1.07 06/28/2021    BILITOTAL 0.5 08/20/2021    AST 37 08/20/2021    ALT 83 (H) 08/20/2021    ALKPHOS 92 08/20/2021    PROTTOTAL 6.2 (L) 08/20/2021    ALBUMIN 3.9 08/20/2021       I have assessed all abnormal lab values for their clinical significance and any values considered clinically significant have been addressed in the assessment and plan.         Assessment and Plan:   Juvenal Blake is a 55 year old male PMH significant for refractory NHL, undergoing  NK infusion. Admitted with rituxan (biosimilar) infusion reaction 6/24 and remained admitted through completion of lymphodepleting chemo and  NK cell infusion.  Currently day +53 after initiation cycle 1 of . Now starting LD chemo for cycle 2 .    #Follicular Lymphoma: The patient is doing very well and had a good response to initial therapy.  He did have a reaction to the bio similar rituximab and that will not be used again.  Dr. Zavala discussed the pros and cons of proceeding with a second cycle of .  In particular, that he does not have any active infections but that he is tooth that is broken is a potential site of trouble.  However it will be 2 months before he is able to fix that and delaying therapy that far is not in his best interest.  He meets criteria to proceed with therapy and he is agreeable with that plan.  He will require antibiotic prophylaxis if and when neutropenic.      - uric acid 4/4 (8/17); resume allopurinol 8/20 with 2nd cycle chemo/     #HEME: Counts stable. No transfusions.  Keep hgb >7g/dL, plt>10,000     #CV: Cardiology cleared him to proceed with no further testing.  The CT angiogram showed no lesion requiring stenting or bypass.    #: The patient has some nocturia but otherwise no other active issues.  Initial radical prostatectomy and bilateral lymph node dissection. November 15, 2017. Prostatic adenocarcinoma Maricel 4+3 = 7 with tertiary  pattern 5. Tumor involves 45% of total surface area. Positive for extensive extraprostatic extension. Positive for bilateral seminal vesicle invasion. Multiple margins positive. Lymphovascular invasion not identified. Perineural invasion was noted. Lymph nodes negative. 3 were examined (2 right obturator and 1 left obturator). Completed radiation to the prostate fossa and pelvic lymph nodes at [facility] early May 2018. He received 4500 cGy in 25 fractions. He then had 2340 cGy boost in 13 fractions to the prostatic fossa, for a total dose of 6240 cGy in 38 treatment fractions delivered over 54 days. He did not receive hormonal therapy:       #ID: He will continue with the current prophylaxis and will have to be put on antibacterial prophylaxis when he becomes neutropenic.  - prophy: ACV, Bactrim- he has not been taking bactrim- he says he was told to stop sometime-inquired if this is for current protocol or just 1 year post NAM NK therapy? Research RN said there is no guidance and pharmacist said they think this is 6 months so will not refill   - hx C diff colitis: finished vancomycin 6/23, treated for 7 days. Ppx bid vanco while neutropenic, now off.      #FEN/Renal: Ordered allopurinol 300 mg daily and sent it to the pharmacy.  Good oral intake at this time.  - Creat wnl     # GI: some loose stools intermittently- monitor.  Fullness to left groin: Now resolved  Abdominal Pain secondary to cancer: oxycodone prn.    Transaminitis: suspect related to cancer. Limit Tylenol, etoh and monitor. Mild ALT elevation of 83 (8/20); follow.    # dental: He was seen by a dentist as described above.  The identified no infection but no repair is possible for at least the next 2 months because of scheduling.  There is a possible increased risk of infection at that site and therefore he will need prophylactic antibiotics were neutropenic.     Final Plan:  Fludarabine/Cytoxan today and Sat/Sun  Start allopurinol  300mg/d  Clinic follow-up then admit to 5C Phase 1 unit for 2nd cycle infusion  Wed 8/25.     30 minutes spent on the date of the encounter doing chart review, review of test results, interpretation of tests, patient visit, documentation and discussion with other provider(s)       Jossie Cason PA-C  [phone]             Again, thank you for allowing me to participate in the care of your patient.        Sincerely,        BMT Advanced Practice Provider

## 2021-08-20 NOTE — PROGRESS NOTES
Infusion Nursing Note:  Juvenal Blake presents today for scheduled infusion.    Patient seen by provider today: Yes: Jossie   present during visit today: Not Applicable.    Note: Patient received cytoxan and fludarabine. Patient was premedicated with kytril. Pre and post flush given-confirmed with pharmacist that it is acceptable to run flushes over 1 hour pre and post. Blood return noted before and after infusion.      Intravenous Access:  PICC.    Treatment Conditions:  Results reviewed, labs MET treatment parameters, ok to proceed with treatment.      Post Infusion Assessment:  Patient tolerated infusion without incident.       Discharge Plan:   Patient and/or family verbalized understanding of discharge instructions and all questions answered.      Ludivina Aquino RN

## 2021-08-20 NOTE — NURSING NOTE
"Oncology Rooming Note    August 20, 2021 9:00 AM   Juvenal Blake is a 55 year old male who presents for:    No chief complaint on file.    Initial Vitals: There were no vitals taken for this visit. Estimated body mass index is 35.99 kg/m  as calculated from the following:    Height as of 8/17/21: 1.715 m (5' 7.52\").    Weight as of an earlier encounter on 8/20/21: 105.9 kg (233 lb 6.4 oz). There is no height or weight on file to calculate BSA.  Data Unavailable Comment: Data Unavailable   No LMP for male patient.  Allergies reviewed: Yes  Medications reviewed: Yes    Medications: Medication refills not needed today.  Pharmacy name entered into [employer]: [id number] - [place], [place] - [address] AT [facility]    Clinical concerns: N/A      Ludivina Aquino RN                "

## 2021-08-21 ENCOUNTER — INFUSION THERAPY VISIT (OUTPATIENT)
Dept: TRANSPLANT | Facility: CLINIC | Age: 56
End: 2021-08-21
Attending: INTERNAL MEDICINE
Payer: COMMERCIAL

## 2021-08-21 ENCOUNTER — LAB (OUTPATIENT)
Dept: LAB | Facility: CLINIC | Age: 56
End: 2021-08-21
Attending: INTERNAL MEDICINE
Payer: COMMERCIAL

## 2021-08-21 VITALS
OXYGEN SATURATION: 99 % | SYSTOLIC BLOOD PRESSURE: 126 MMHG | RESPIRATION RATE: 20 BRPM | TEMPERATURE: 97.9 F | HEART RATE: 66 BPM | DIASTOLIC BLOOD PRESSURE: 70 MMHG

## 2021-08-21 VITALS
RESPIRATION RATE: 16 BRPM | OXYGEN SATURATION: 99 % | SYSTOLIC BLOOD PRESSURE: 124 MMHG | HEART RATE: 64 BPM | WEIGHT: 236.6 LBS | BODY MASS INDEX: 36.49 KG/M2 | DIASTOLIC BLOOD PRESSURE: 76 MMHG | TEMPERATURE: 98.1 F

## 2021-08-21 DIAGNOSIS — Z85.72 HISTORY OF LYMPHOMA: ICD-10-CM

## 2021-08-21 DIAGNOSIS — C82.08 FOLLICULAR LYMPHOMA GRADE I, LYMPH NODES OF MULTIPLE SITES (H): Primary | ICD-10-CM

## 2021-08-21 DIAGNOSIS — C82.08 FOLLICULAR LYMPHOMA GRADE I, LYMPH NODES OF MULTIPLE SITES (H): ICD-10-CM

## 2021-08-21 LAB
ALBUMIN SERPL-MCNC: 3.8 G/DL (ref 3.4–5)
ALP SERPL-CCNC: 86 U/L (ref 40–150)
ALT SERPL W P-5'-P-CCNC: 70 U/L (ref 0–70)
ANION GAP SERPL CALCULATED.3IONS-SCNC: 6 MMOL/L (ref 3–14)
AST SERPL W P-5'-P-CCNC: 27 U/L (ref 0–45)
BASOPHILS # BLD AUTO: 0 10E3/UL (ref 0–0.2)
BASOPHILS NFR BLD AUTO: 0 %
BILIRUB SERPL-MCNC: 0.4 MG/DL (ref 0.2–1.3)
BUN SERPL-MCNC: 9 MG/DL (ref 7–30)
CALCIUM SERPL-MCNC: 8.4 MG/DL (ref 8.5–10.1)
CHLORIDE BLD-SCNC: 110 MMOL/L (ref 94–109)
CO2 SERPL-SCNC: 27 MMOL/L (ref 20–32)
CREAT SERPL-MCNC: 0.94 MG/DL (ref 0.66–1.25)
EOSINOPHIL # BLD AUTO: 0.1 10E3/UL (ref 0–0.7)
EOSINOPHIL NFR BLD AUTO: 5 %
ERYTHROCYTE [DISTWIDTH] IN BLOOD BY AUTOMATED COUNT: 14.7 % (ref 10–15)
GFR SERPL CREATININE-BSD FRML MDRD: 90 ML/MIN/1.73M2
GLUCOSE BLD-MCNC: 132 MG/DL (ref 70–99)
HCT VFR BLD AUTO: 33.2 % (ref 40–53)
HGB BLD-MCNC: 11.3 G/DL (ref 13.3–17.7)
IMM GRANULOCYTES # BLD: 0 10E3/UL
IMM GRANULOCYTES NFR BLD: 2 %
LYMPHOCYTES # BLD AUTO: 0.4 10E3/UL (ref 0.8–5.3)
LYMPHOCYTES NFR BLD AUTO: 16 %
MCH RBC QN AUTO: 32.3 PG (ref 26.5–33)
MCHC RBC AUTO-ENTMCNC: 34 G/DL (ref 31.5–36.5)
MCV RBC AUTO: 95 FL (ref 78–100)
MONOCYTES # BLD AUTO: 0.2 10E3/UL (ref 0–1.3)
MONOCYTES NFR BLD AUTO: 8 %
NEUTROPHILS # BLD AUTO: 1.8 10E3/UL (ref 1.6–8.3)
NEUTROPHILS NFR BLD AUTO: 69 %
NRBC # BLD AUTO: 0 10E3/UL
NRBC BLD AUTO-RTO: 0 /100
PLATELET # BLD AUTO: 100 10E3/UL (ref 150–450)
POTASSIUM BLD-SCNC: 4.1 MMOL/L (ref 3.4–5.3)
PROT SERPL-MCNC: 5.8 G/DL (ref 6.8–8.8)
RBC # BLD AUTO: 3.5 10E6/UL (ref 4.4–5.9)
SODIUM SERPL-SCNC: 143 MMOL/L (ref 133–144)
URATE SERPL-MCNC: 3.8 MG/DL (ref 3.5–7.2)
WBC # BLD AUTO: 2.5 10E3/UL (ref 4–11)

## 2021-08-21 PROCEDURE — 96375 TX/PRO/DX INJ NEW DRUG ADDON: CPT

## 2021-08-21 PROCEDURE — 96417 CHEMO IV INFUS EACH ADDL SEQ: CPT

## 2021-08-21 PROCEDURE — 96415 CHEMO IV INFUSION ADDL HR: CPT

## 2021-08-21 PROCEDURE — 96413 CHEMO IV INFUSION 1 HR: CPT

## 2021-08-21 PROCEDURE — 99214 OFFICE O/P EST MOD 30 MIN: CPT

## 2021-08-21 PROCEDURE — G0463 HOSPITAL OUTPT CLINIC VISIT: HCPCS | Mod: 25

## 2021-08-21 PROCEDURE — 85025 COMPLETE CBC W/AUTO DIFF WBC: CPT

## 2021-08-21 PROCEDURE — 82040 ASSAY OF SERUM ALBUMIN: CPT

## 2021-08-21 PROCEDURE — 250N000011 HC RX IP 250 OP 636: Performed by: INTERNAL MEDICINE

## 2021-08-21 PROCEDURE — 84550 ASSAY OF BLOOD/URIC ACID: CPT

## 2021-08-21 PROCEDURE — 36592 COLLECT BLOOD FROM PICC: CPT

## 2021-08-21 PROCEDURE — 96361 HYDRATE IV INFUSION ADD-ON: CPT

## 2021-08-21 PROCEDURE — 258N000003 HC RX IP 258 OP 636: Performed by: INTERNAL MEDICINE

## 2021-08-21 RX ORDER — HEPARIN SODIUM,PORCINE 10 UNIT/ML
5 VIAL (ML) INTRAVENOUS ONCE
Status: COMPLETED | OUTPATIENT
Start: 2021-08-21 | End: 2021-08-21

## 2021-08-21 RX ORDER — ACETAMINOPHEN 325 MG/1
650 TABLET ORAL ONCE
Status: DISCONTINUED | OUTPATIENT
Start: 2021-08-21 | End: 2021-08-21 | Stop reason: CLARIF

## 2021-08-21 RX ORDER — GRANISETRON HYDROCHLORIDE 1 MG/ML
1 INJECTION INTRAVENOUS ONCE
Status: COMPLETED | OUTPATIENT
Start: 2021-08-21 | End: 2021-08-21

## 2021-08-21 RX ORDER — DIPHENHYDRAMINE HCL 25 MG
50 CAPSULE ORAL ONCE
Status: DISCONTINUED | OUTPATIENT
Start: 2021-08-21 | End: 2021-08-21 | Stop reason: CLARIF

## 2021-08-21 RX ADMIN — Medication 5 ML: at 07:30

## 2021-08-21 RX ADMIN — FLUDARABINE PHOSPHATE 71 MG: 25 INJECTION, SOLUTION INTRAVENOUS at 11:03

## 2021-08-21 RX ADMIN — SODIUM CHLORIDE 500 ML: 9 INJECTION, SOLUTION INTRAVENOUS at 07:38

## 2021-08-21 RX ADMIN — SODIUM CHLORIDE 500 ML: 9 INJECTION, SOLUTION INTRAVENOUS at 10:53

## 2021-08-21 RX ADMIN — CYCLOPHOSPHAMIDE 1060 MG: 1 INJECTION, POWDER, FOR SOLUTION INTRAVENOUS; ORAL at 08:52

## 2021-08-21 RX ADMIN — GRANISETRON HYDROCHLORIDE 1 MG: 1 INJECTION INTRAVENOUS at 08:13

## 2021-08-21 ASSESSMENT — PAIN SCALES - GENERAL: PAINLEVEL: NO PAIN (1)

## 2021-08-21 NOTE — NURSING NOTE
Chief Complaint   Patient presents with     RECHECK     pt here for recheck for      Blood Draw     Labs drawn via PICC by RN. VS taken.     Labs drawn from PICC by rn.  Good blood return noted in both lumens.  Both lumens flushed with NS and heparin.  Pt tolerated well.  VS taken.  Pt checked in for next appt.    Research collected (pre-infusion).    Vern Trotter RN

## 2021-08-21 NOTE — PROGRESS NOTES
Infusion Nursing Note:  Juvenal Blake presents today for day -4 cycle 2 Cytoxan and Fludarabine.    Patient seen by provider today: Yes: Dr. Zavala   present during visit today: Not Applicable.    Note: Patient here for scheduled chemo. Patient was seen and assessed by Dr. Zavala. Labs monitored and no parameters today. Patient received 500 ml over 1 hour pre and post cytoxan. Pre-meds of Kytril given. No tylenol and benadryl given po per pharmacist as he is not getting Rituxan today. Chemo double checked with 2 RNs prior to chemo and positive blood return in picc as well. Patient tolerated infusions well without any issues or side affects.       Intravenous Access:  PICC.    Treatment Conditions:  Lab Results   Component Value Date    HGB 11.3 08/21/2021    HGB 7.6 07/08/2021     Lab Results   Component Value Date    WBC 2.5 08/21/2021    WBC 2.6 07/08/2021      Lab Results   Component Value Date    ANEU 0.5 07/08/2021     Lab Results   Component Value Date     08/21/2021     07/08/2021      Lab Results   Component Value Date     08/21/2021     07/08/2021                   Lab Results   Component Value Date    POTASSIUM 4.1 08/21/2021    POTASSIUM 4.2 07/08/2021           Lab Results   Component Value Date    MAG 2.2 08/20/2021    MAG 2.2 07/06/2021            Lab Results   Component Value Date    CR 0.94 08/21/2021    CR 1.03 07/08/2021                   Lab Results   Component Value Date    TRE 8.4 08/21/2021    TRE 8.9 07/08/2021                Lab Results   Component Value Date    BILITOTAL 0.4 08/21/2021    BILITOTAL 0.6 07/08/2021           Lab Results   Component Value Date    ALBUMIN 3.8 08/21/2021    ALBUMIN 2.8 07/08/2021                    Lab Results   Component Value Date    ALT 70 08/21/2021    ALT 48 07/08/2021           Lab Results   Component Value Date    AST 27 08/21/2021    AST 24 07/08/2021       Results reviewed, labs MET treatment parameters, ok to  proceed with treatment.      Post Infusion Assessment:  Patient tolerated infusion without incident.  Blood return noted pre and post infusion.  Site patent and intact, free from redness, edema or discomfort.  No evidence of extravasations.  Access discontinued per protocol.       Discharge Plan:   Patient discharged in stable condition accompanied by: wife.  Departure Mode: Ambulatory.      PONCHO NGUYEN RN

## 2021-08-21 NOTE — LETTER
8/21/2021         RE: Juvenal Blake  [address] [place] [address]        Dear Colleague,    Thank you for referring your patient, Juvenal Blake, to the [facility] BLOOD AND MARROW TRANSPLANT PROGRAM [place]. Please see a copy of my visit note below.    BMT Clinic Note      Juvenal Blake is a 55 year old male PMH significant for refractory NHL, undergoing  NK infusion. Admitted with rituxan (biosimilar) infusion reaction 6/24 and remained admitted through completion of lymphodepleting chemo and  NK cell infusion.  Currently day +54 after initiation . D+2 LD  Chemo for cycle 2 .     HPI: Here for fludarabine/Cytoxan. No n/v. Some diarrhea this morning - says intermittent loose stools 1-2x/week. No abd pain. No fevers. No dental/oral pain. Some LE edema slightly increased again.     ROS:  10 point ROS negative except as above.      Physical Exam:   /76   Pulse 64   Temp 98.1  F (36.7  C) (Oral)   Resp 16   Wt 107.3 kg (236 lb 9.6 oz)   SpO2 99%   BMI 36.49 kg/m    General: NAD, alert oriented  Eyes: sclera anicteric  Mouth: OP moist without lesions. R front tooth/central incisor chipped down to base per pt but has flipper in place now. No gum erythema/edema  Lungs: CTAB  Cardiovascular: RRR, no m/r/g  Abdominal/Rectal: normoactive BS, protuberant, no palpable organomegalies or masses.  Lymphatics: trace - 1+ LE edema bilaterally, wearing compression stockings.    Skin: no rash; large fading bruise to RUE around PICC  Neuro: non-focal  Access: R arm PICC NT -dressing changed 8/16  Port-A-Cath on the right upper chest not accessed.    ICANS: 10/10 (8/20/21)- pt sentence log is in research folder in the [facility]. Due again on 8/25.     ECOG 1     Labs:  Lab Results   Component Value Date    WBC 2.5 (L) 08/21/2021    ANEU 0.5 (L) 07/08/2021    HGB 11.3 (L) 08/21/2021    HCT 33.2 (L) 08/21/2021     (L) 08/21/2021     08/21/2021    POTASSIUM 4.1 08/21/2021     CHLORIDE 110 (H) 08/21/2021    CO2 27 08/21/2021     (H) 08/21/2021    BUN 9 08/21/2021    CR 0.94 08/21/2021    MAG 2.2 08/20/2021    INR 1.07 06/28/2021    BILITOTAL 0.4 08/21/2021    AST 27 08/21/2021    ALT 70 08/21/2021    ALKPHOS 86 08/21/2021    PROTTOTAL 5.8 (L) 08/21/2021    ALBUMIN 3.8 08/21/2021       I have assessed all abnormal lab values for their clinical significance and any values considered clinically significant have been addressed in the assessment and plan.         Assessment and Plan:   Juvenal Blake is a 55 year old male PMH significant for refractory NHL, undergoing  NK infusion. Admitted with rituxan (biosimilar) infusion reaction 6/24 and remained admitted through completion of lymphodepleting chemo and  NK cell infusion.    #Follicular Lymphoma: The patient is doing very well and had a good response to initial therapy.  He did have a reaction to the bio similar rituximab and that will not be used again.  Dr. Zavala discussed the pros and cons of proceeding with a second cycle of .  In particular, that he does not have any active infections but that he is tooth that is broken is a potential site of trouble.  However it will be 2 months before he is able to fix that and delaying therapy that far is not in his best interest.  He meets criteria to proceed with therapy and he is agreeable with that plan.  He will require antibiotic prophylaxis if and when neutropenic.      - uric acid 4/4 (8/17); resume allopurinol 8/20 with 2nd cycle chemo/.  Yesterday but he took allopurinol this morning.     #HEME: Counts stable and have not yet started trending down. No transfusions.  Keep hgb >7g/dL, plt>10,000     #CV: Cardiology cleared him to proceed with no further testing.  The CT angiogram showed no lesion requiring stenting or bypass.    #: The patient had no  complaints today the patient has some nocturia but otherwise no other active issues.  Initial  radical prostatectomy and bilateral lymph node dissection. November 15, 2017. Prostatic adenocarcinoma [place] 4+3 = 7 with tertiary pattern 5. Tumor involves 45% of total surface area. Positive for extensive extraprostatic extension. Positive for bilateral seminal vesicle invasion. Multiple margins positive. Lymphovascular invasion not identified. Perineural invasion was noted. Lymph nodes negative. 3 were examined (2 right obturator and 1 left obturator). Completed radiation to the prostate fossa and pelvic lymph nodes at [facility] early May 2018. He received 4500 cGy in 25 fractions. He then had 2340 cGy boost in 13 fractions to the prostatic fossa, for a total dose of 6240 cGy in 38 treatment fractions delivered over 54 days. He did not receive hormonal therapy:       #ID: He will continue with the current prophylaxis and will have to be put on antibacterial prophylaxis when he becomes neutropenic.  - prophy: ACV, Bactrim- he has not been taking bactrim- he says he was told to stop sometime-inquired if this is for current protocol or just 1 year post NAM NK therapy? Research RN said there is no guidance and pharmacist said they think this is 6 months so will not refill   - hx C diff colitis: finished vancomycin 6/23, treated for 7 days. Ppx bid vanco while neutropenic, now off.      #FEN/Renal: He started allopurinol today as a stated above.  Otherwise excellent oral intake.    - Creat wnl     # GI: Stools remain loose but not watery.  He occasionally has some discomfort in the abdomen when he jiggles his abdomen.  No real abdominal pain or cramps.    Fullness to left groin: Now resolved  Abdominal Pain secondary to cancer: oxycodone prn.    Transaminitis: suspect related to cancer. Limit Tylenol, etoh and monitor. Mild ALT elevation of 83 (8/20); follow.    # dental: He was seen by a dentist as described above.  The identified no infection but no repair is possible for at least the next 2 months  because of scheduling.  There is a possible increased risk of infection at that site and therefore he will need prophylactic antibiotics were neutropenic.     Final Plan:  Fludarabine/Cytoxan today and Sun  Continue allopurinol 300mg/d  Clinic follow-up then admit to  Phase 1 unit for 2nd cycle infusion  Wed 8/25.     30 minutes spent on the date of the encounter doing chart review, review of test results, interpretation of tests, patient visit, documentation and discussion with other provider(s)       Scott Zavala MD on 8/21/2021 at 8:14 AM               Again, thank you for allowing me to participate in the care of your patient.        Sincerely,        BMT [facility]

## 2021-08-21 NOTE — NURSING NOTE
"Oncology Rooming Note    August 21, 2021 7:23 AM   Juvenal Blake is a 55 year old male who presents for:    Chief Complaint   Patient presents with     RECHECK     pt here for recheck for      Initial Vitals: There were no vitals taken for this visit. Estimated body mass index is 35.99 kg/m  as calculated from the following:    Height as of 8/17/21: 1.715 m (5' 7.52\").    Weight as of 8/20/21: 105.9 kg (233 lb 6.4 oz). There is no height or weight on file to calculate BSA.  Data Unavailable Comment: Data Unavailable   No LMP for male patient.  Allergies reviewed: Yes  Medications reviewed: Yes    Medications: Medication refills not needed today.  Pharmacy [facility] entered into [facility]: [id number] - [place], [place] - [address] AT [facility]    Clinical concerns: none      PONCHO NGUYEN RN              "

## 2021-08-21 NOTE — LETTER
8/21/2021         RE: Juevnal Blake  [address] [place] [address]        Dear Colleague,    Thank you for referring your patient, Juvenal Blake, to the [facility] BLOOD AND MARROW TRANSPLANT PROGRAM [place]. Please see a copy of my visit note below.    Infusion Nursing Note:  Juvenal Blake presents today for day -4 cycle 2 Cytoxan and Fludarabine.    Patient seen by provider today: Yes: Dr. Zavala   present during visit today: Not Applicable.    Note: Patient here for scheduled chemo. Patient was seen and assessed by Dr. Zavala. Labs monitored and no parameters today. Patient received 500 ml over 1 hour pre and post cytoxan. Pre-meds of Kytril given. No tylenol and benadryl given po per pharmacist as he is not getting Rituxan today. Chemo double checked with 2 RNs prior to chemo and positive blood return in picc as well. Patient tolerated infusions well without any issues or side affects.       Intravenous Access:  PICC.    Treatment Conditions:  Lab Results   Component Value Date    HGB 11.3 08/21/2021    HGB 7.6 07/08/2021     Lab Results   Component Value Date    WBC 2.5 08/21/2021    WBC 2.6 07/08/2021      Lab Results   Component Value Date    ANEU 0.5 07/08/2021     Lab Results   Component Value Date     08/21/2021     07/08/2021      Lab Results   Component Value Date     08/21/2021     07/08/2021                   Lab Results   Component Value Date    POTASSIUM 4.1 08/21/2021    POTASSIUM 4.2 07/08/2021           Lab Results   Component Value Date    MAG 2.2 08/20/2021    MAG 2.2 07/06/2021            Lab Results   Component Value Date    CR 0.94 08/21/2021    CR 1.03 07/08/2021                   Lab Results   Component Value Date    TRE 8.4 08/21/2021    TRE 8.9 07/08/2021                Lab Results   Component Value Date    BILITOTAL 0.4 08/21/2021    BILITOTAL 0.6 07/08/2021           Lab Results   Component Value Date    ALBUMIN 3.8  08/21/2021    ALBUMIN 2.8 07/08/2021                    Lab Results   Component Value Date    ALT 70 08/21/2021    ALT 48 07/08/2021           Lab Results   Component Value Date    AST 27 08/21/2021    AST 24 07/08/2021       Results reviewed, labs MET treatment parameters, ok to proceed with treatment.      Post Infusion Assessment:  Patient tolerated infusion without incident.  Blood return noted pre and post infusion.  Site patent and intact, free from redness, edema or discomfort.  No evidence of extravasations.  Access discontinued per protocol.       Discharge Plan:   Patient discharged in stable condition accompanied by: wife.  Departure Mode: Ambulatory.      PONCHO NGUYEN RN                          Again, thank you for allowing me to participate in the care of your patient.        Sincerely,        [facility]

## 2021-08-21 NOTE — PROGRESS NOTES
BMT Clinic Note      Juvenal Blake is a 55 year old male PMH significant for refractory NHL, undergoing  NK infusion. Admitted with rituxan (biosimilar) infusion reaction 6/24 and remained admitted through completion of lymphodepleting chemo and  NK cell infusion.  Currently day +54 after initiation . D+2 LD  Chemo for cycle 2 .     HPI: Here for fludarabine/Cytoxan. No n/v. Some diarrhea this morning - says intermittent loose stools 1-2x/week. No abd pain. No fevers. No dental/oral pain. Some LE edema slightly increased again.     ROS:  10 point ROS negative except as above.      Physical Exam:   /76   Pulse 64   Temp 98.1  F (36.7  C) (Oral)   Resp 16   Wt 107.3 kg (236 lb 9.6 oz)   SpO2 99%   BMI 36.49 kg/m    General: NAD, alert oriented  Eyes: sclera anicteric  Mouth: OP moist without lesions. R front tooth/central incisor chipped down to base per pt but has flipper in place now. No gum erythema/edema  Lungs: CTAB  Cardiovascular: RRR, no m/r/g  Abdominal/Rectal: normoactive BS, protuberant, no palpable organomegalies or masses.  Lymphatics: trace - 1+ LE edema bilaterally, wearing compression stockings.    Skin: no rash; large fading bruise to RUE around PICC  Neuro: non-focal  Access: R arm PICC NT -dressing changed 8/16  Port-A-Cath on the right upper chest not accessed.    ICANS: 10/10 (8/20/21)- pt sentence log is in research folder in the [facility]. Due again on 8/25.     ECOG 1     Labs:  Lab Results   Component Value Date    WBC 2.5 (L) 08/21/2021    ANEU 0.5 (L) 07/08/2021    HGB 11.3 (L) 08/21/2021    HCT 33.2 (L) 08/21/2021     (L) 08/21/2021     08/21/2021    POTASSIUM 4.1 08/21/2021    CHLORIDE 110 (H) 08/21/2021    CO2 27 08/21/2021     (H) 08/21/2021    BUN 9 08/21/2021    CR 0.94 08/21/2021    MAG 2.2 08/20/2021    INR 1.07 06/28/2021    BILITOTAL 0.4 08/21/2021    AST 27 08/21/2021    ALT 70 08/21/2021    ALKPHOS 86 08/21/2021    PROTTOTAL 5.8 (L)  08/21/2021    ALBUMIN 3.8 08/21/2021       I have assessed all abnormal lab values for their clinical significance and any values considered clinically significant have been addressed in the assessment and plan.         Assessment and Plan:   Juvenal Blake is a 55 year old male PMH significant for refractory NHL, undergoing  NK infusion. Admitted with rituxan (biosimilar) infusion reaction 6/24 and remained admitted through completion of lymphodepleting chemo and  NK cell infusion.    #Follicular Lymphoma: The patient is doing very well and had a good response to initial therapy.  He did have a reaction to the bio similar rituximab and that will not be used again.  Dr. Zavala discussed the pros and cons of proceeding with a second cycle of .  In particular, that he does not have any active infections but that he is tooth that is broken is a potential site of trouble.  However it will be 2 months before he is able to fix that and delaying therapy that far is not in his best interest.  He meets criteria to proceed with therapy and he is agreeable with that plan.  He will require antibiotic prophylaxis if and when neutropenic.      - uric acid 4/4 (8/17); resume allopurinol 8/20 with 2nd cycle chemo/.  Yesterday but he took allopurinol this morning.     #HEME: Counts stable and have not yet started trending down. No transfusions.  Keep hgb >7g/dL, plt>10,000     #CV: Cardiology cleared him to proceed with no further testing.  The CT angiogram showed no lesion requiring stenting or bypass.    #: The patient had no  complaints today the patient has some nocturia but otherwise no other active issues.  Initial radical prostatectomy and bilateral lymph node dissection. November 15, 2017. Prostatic adenocarcinoma [place] 4+3 = 7 with tertiary pattern 5. Tumor involves 45% of total surface area. Positive for extensive extraprostatic extension. Positive for bilateral seminal vesicle invasion.  Multiple margins positive. Lymphovascular invasion not identified. Perineural invasion was noted. Lymph nodes negative. 3 were examined (2 right obturator and 1 left obturator). Completed radiation to the prostate fossa and pelvic lymph nodes at [facility] early May 2018. He received 4500 cGy in 25 fractions. He then had 2340 cGy boost in 13 fractions to the prostatic fossa, for a total dose of 6240 cGy in 38 treatment fractions delivered over 54 days. He did not receive hormonal therapy:       #ID: He will continue with the current prophylaxis and will have to be put on antibacterial prophylaxis when he becomes neutropenic.  - prophy: ACV, Bactrim- he has not been taking bactrim- he says he was told to stop sometime-inquired if this is for current protocol or just 1 year post NAM NK therapy? Research RN said there is no guidance and pharmacist said they think this is 6 months so will not refill   - hx C diff colitis: finished vancomycin 6/23, treated for 7 days. Ppx bid vanco while neutropenic, now off.      #FEN/Renal: He started allopurinol today as a stated above.  Otherwise excellent oral intake.    - Creat wnl     # GI: Stools remain loose but not watery.  He occasionally has some discomfort in the abdomen when he jiggles his abdomen.  No real abdominal pain or cramps.    Fullness to left groin: Now resolved  Abdominal Pain secondary to cancer: oxycodone prn.    Transaminitis: suspect related to cancer. Limit Tylenol, etoh and monitor. Mild ALT elevation of 83 (8/20); follow.    # dental: He was seen by a dentist as described above.  The identified no infection but no repair is possible for at least the next 2 months because of scheduling.  There is a possible increased risk of infection at that site and therefore he will need prophylactic antibiotics were neutropenic.     Final Plan:  Fludarabine/Cytoxan today and Sun  Continue allopurinol 300mg/d  Clinic follow-up then admit to  Phase 1 unit for  2nd cycle infusion  Wed 8/25.     30 minutes spent on the date of the encounter doing chart review, review of test results, interpretation of tests, patient visit, documentation and discussion with other provider(s)       Scott Zavala MD on 8/21/2021 at 8:14 AM

## 2021-08-22 ENCOUNTER — ONCOLOGY VISIT (OUTPATIENT)
Dept: TRANSPLANT | Facility: CLINIC | Age: 56
End: 2021-08-22
Attending: INTERNAL MEDICINE
Payer: COMMERCIAL

## 2021-08-22 ENCOUNTER — APPOINTMENT (OUTPATIENT)
Dept: LAB | Facility: CLINIC | Age: 56
End: 2021-08-22
Attending: INTERNAL MEDICINE
Payer: COMMERCIAL

## 2021-08-22 VITALS
DIASTOLIC BLOOD PRESSURE: 76 MMHG | OXYGEN SATURATION: 100 % | HEART RATE: 68 BPM | RESPIRATION RATE: 18 BRPM | SYSTOLIC BLOOD PRESSURE: 126 MMHG | TEMPERATURE: 97.8 F

## 2021-08-22 VITALS
RESPIRATION RATE: 16 BRPM | HEART RATE: 70 BPM | WEIGHT: 237.2 LBS | SYSTOLIC BLOOD PRESSURE: 125 MMHG | OXYGEN SATURATION: 98 % | BODY MASS INDEX: 36.58 KG/M2 | TEMPERATURE: 98.1 F | DIASTOLIC BLOOD PRESSURE: 78 MMHG

## 2021-08-22 DIAGNOSIS — C82.08 FOLLICULAR LYMPHOMA GRADE I, LYMPH NODES OF MULTIPLE SITES (H): Primary | ICD-10-CM

## 2021-08-22 LAB
ANION GAP SERPL CALCULATED.3IONS-SCNC: 6 MMOL/L (ref 3–14)
BASOPHILS # BLD MANUAL: 0 10E3/UL (ref 0–0.2)
BASOPHILS NFR BLD MANUAL: 1 %
BUN SERPL-MCNC: 10 MG/DL (ref 7–30)
CALCIUM SERPL-MCNC: 8.5 MG/DL (ref 8.5–10.1)
CHLORIDE BLD-SCNC: 110 MMOL/L (ref 94–109)
CO2 SERPL-SCNC: 27 MMOL/L (ref 20–32)
CREAT SERPL-MCNC: 0.96 MG/DL (ref 0.66–1.25)
DACRYOCYTES BLD QL SMEAR: SLIGHT
EOSINOPHIL # BLD MANUAL: 0.1 10E3/UL (ref 0–0.7)
EOSINOPHIL NFR BLD MANUAL: 4 %
ERYTHROCYTE [DISTWIDTH] IN BLOOD BY AUTOMATED COUNT: 14.6 % (ref 10–15)
GFR SERPL CREATININE-BSD FRML MDRD: 87 ML/MIN/1.73M2
GLUCOSE BLD-MCNC: 143 MG/DL (ref 70–99)
HCT VFR BLD AUTO: 32 % (ref 40–53)
HGB BLD-MCNC: 11 G/DL (ref 13.3–17.7)
LYMPHOCYTES # BLD MANUAL: 0.1 10E3/UL (ref 0.8–5.3)
LYMPHOCYTES NFR BLD MANUAL: 4 %
MCH RBC QN AUTO: 32.6 PG (ref 26.5–33)
MCHC RBC AUTO-ENTMCNC: 34.4 G/DL (ref 31.5–36.5)
MCV RBC AUTO: 95 FL (ref 78–100)
MONOCYTES # BLD MANUAL: 0.1 10E3/UL (ref 0–1.3)
MONOCYTES NFR BLD MANUAL: 4 %
NEUTROPHILS # BLD MANUAL: 2.3 10E3/UL (ref 1.6–8.3)
NEUTROPHILS NFR BLD MANUAL: 87 %
PLAT MORPH BLD: ABNORMAL
PLATELET # BLD AUTO: 80 10E3/UL (ref 150–450)
POTASSIUM BLD-SCNC: 4 MMOL/L (ref 3.4–5.3)
RBC # BLD AUTO: 3.37 10E6/UL (ref 4.4–5.9)
RBC MORPH BLD: ABNORMAL
SARS-COV-2 RNA RESP QL NAA+PROBE: NEGATIVE
SODIUM SERPL-SCNC: 143 MMOL/L (ref 133–144)
URATE SERPL-MCNC: 3.9 MG/DL (ref 3.5–7.2)
WBC # BLD AUTO: 2.6 10E3/UL (ref 4–11)

## 2021-08-22 PROCEDURE — 250N000011 HC RX IP 250 OP 636: Performed by: INTERNAL MEDICINE

## 2021-08-22 PROCEDURE — 80048 BASIC METABOLIC PNL TOTAL CA: CPT

## 2021-08-22 PROCEDURE — U0005 INFEC AGEN DETEC AMPLI PROBE: HCPCS | Performed by: INTERNAL MEDICINE

## 2021-08-22 PROCEDURE — 96413 CHEMO IV INFUSION 1 HR: CPT

## 2021-08-22 PROCEDURE — 36592 COLLECT BLOOD FROM PICC: CPT

## 2021-08-22 PROCEDURE — 96417 CHEMO IV INFUS EACH ADDL SEQ: CPT

## 2021-08-22 PROCEDURE — 96415 CHEMO IV INFUSION ADDL HR: CPT

## 2021-08-22 PROCEDURE — 258N000003 HC RX IP 258 OP 636: Performed by: INTERNAL MEDICINE

## 2021-08-22 PROCEDURE — 85027 COMPLETE CBC AUTOMATED: CPT

## 2021-08-22 PROCEDURE — G0463 HOSPITAL OUTPT CLINIC VISIT: HCPCS | Mod: 25

## 2021-08-22 PROCEDURE — 99214 OFFICE O/P EST MOD 30 MIN: CPT

## 2021-08-22 PROCEDURE — 250N000011 HC RX IP 250 OP 636: Mod: JW | Performed by: INTERNAL MEDICINE

## 2021-08-22 PROCEDURE — 84550 ASSAY OF BLOOD/URIC ACID: CPT

## 2021-08-22 PROCEDURE — 96375 TX/PRO/DX INJ NEW DRUG ADDON: CPT

## 2021-08-22 RX ORDER — HEPARIN SODIUM,PORCINE 10 UNIT/ML
5 VIAL (ML) INTRAVENOUS ONCE
Status: COMPLETED | OUTPATIENT
Start: 2021-08-22 | End: 2021-08-22

## 2021-08-22 RX ORDER — GRANISETRON HYDROCHLORIDE 1 MG/ML
1 INJECTION INTRAVENOUS ONCE
Status: COMPLETED | OUTPATIENT
Start: 2021-08-22 | End: 2021-08-22

## 2021-08-22 RX ADMIN — Medication 5 ML: at 07:20

## 2021-08-22 RX ADMIN — FLUDARABINE PHOSPHATE 71 MG: 25 INJECTION, SOLUTION INTRAVENOUS at 10:56

## 2021-08-22 RX ADMIN — SODIUM CHLORIDE 500 ML: 9 INJECTION, SOLUTION INTRAVENOUS at 10:41

## 2021-08-22 RX ADMIN — CYCLOPHOSPHAMIDE 1060 MG: 1 INJECTION, POWDER, FOR SOLUTION INTRAVENOUS; ORAL at 08:34

## 2021-08-22 RX ADMIN — SODIUM CHLORIDE 500 ML: 9 INJECTION, SOLUTION INTRAVENOUS at 07:26

## 2021-08-22 RX ADMIN — GRANISETRON HYDROCHLORIDE 1 MG: 1 INJECTION INTRAVENOUS at 07:40

## 2021-08-22 ASSESSMENT — PAIN SCALES - GENERAL: PAINLEVEL: MILD PAIN (2)

## 2021-08-22 NOTE — PROGRESS NOTES
BMT Clinic Note      Juvenal Blake is a 55 year old male PMH significant for refractory NHL, undergoing  NK infusion. Admitted with rituxan (biosimilar) infusion reaction 6/24 and remained admitted through completion of lymphodepleting chemo and  NK cell infusion.  Currently day +54 after initiation . D+3 LD  Chemo for cycle 2 .     HPI: Here for fludarabine/Cytoxan.  Had a good day yesterday with no problems.  Still having 3-4 loose but not watery stools.  Has a little bit of hemorrhoids but not at painful.  Has a mild cough that has not changed in the last 7 days.  No tooth pain.  No fevers. Some LE edema.      ROS:  10 point ROS negative except as above.      Physical Exam:   /78   Pulse 70   Temp 98.1  F (36.7  C) (Oral)   Resp 16   Wt 107.6 kg (237 lb 3.2 oz)   SpO2 98%   BMI 36.58 kg/m    General: NAD, alert oriented  Eyes: sclera anicteric  Mouth: OP moist without lesions. R front tooth/central incisor chipped down to base per pt but has flipper in place now. No gum erythema/edema  Lungs: CTAB  Cardiovascular: RRR, no m/r/g  Abdominal/Rectal: normoactive BS, protuberant, no palpable organomegalies or masses.  Lymphatics: trace - 1-2+ LE edema bilaterally, wearing compression stockings.    Skin: no rash; large fading bruise to RUE around PICC  Neuro: non-focal  Access: R arm PICC NT -dressing changed 8/16  Port-A-Cath on the right upper chest not accessed.    ICANS: 10/10 (8/20/21)- pt sentence log is in research folder in the [facility]. Due again on 8/25.     ECOG 1     Labs:  Lab Results   Component Value Date    WBC 2.6 (L) 08/22/2021    ANEU 2.3 08/22/2021    HGB 11.0 (L) 08/22/2021    HCT 32.0 (L) 08/22/2021    PLT 80 (L) 08/22/2021     08/22/2021    POTASSIUM 4.0 08/22/2021    CHLORIDE 110 (H) 08/22/2021    CO2 27 08/22/2021     (H) 08/22/2021    BUN 10 08/22/2021    CR 0.96 08/22/2021    MAG 2.2 08/20/2021    INR 1.07 06/28/2021    BILITOTAL 0.4 08/21/2021    AST  27 08/21/2021    ALT 70 08/21/2021    ALKPHOS 86 08/21/2021    PROTTOTAL 5.8 (L) 08/21/2021    ALBUMIN 3.8 08/21/2021       I have assessed all abnormal lab values for their clinical significance and any values considered clinically significant have been addressed in the assessment and plan.         Assessment and Plan:   Juvenal Blake is a 55 year old male PMH significant for refractory NHL, undergoing  NK infusion. Admitted with rituxan (biosimilar) infusion reaction 6/24 and remained admitted through completion of lymphodepleting chemo and  NK cell infusion.    #Follicular Lymphoma: The patient is doing very well and had a good response to initial therapy.  He did have a reaction to the bio similar rituximab and that will not be used again.  Dr. Zavala discussed the pros and cons of proceeding with a second cycle of .  In particular, that he does not have any active infections but that he is tooth that is broken is a potential site of trouble.  However it will be 2 months before he is able to fix that and delaying therapy that far is not in his best interest.  He meets criteria to proceed with therapy and he is agreeable with that plan.  He will require antibiotic prophylaxis if and when neutropenic.    - uric acid 4/4 (8/17); resume allopurinol 8/20 with 2nd cycle chemo/.  Yesterday but he took allopurinol this morning.     #HEME: Counts stable and have not yet requiring transfusions.  Keep hgb >7g/dL, plt>10,000     #CV: Cardiology cleared him to proceed with no further testing.  The CT angiogram showed no lesion requiring stenting or bypass.    #: The patient had no  complaints today the patient has some nocturia but otherwise no other active issues.  Initial radical prostatectomy and bilateral lymph node dissection. November 15, 2017. Prostatic adenocarcinoma [place] 4+3 = 7 with tertiary pattern 5. Tumor involves 45% of total surface area. Positive for extensive extraprostatic  extension. Positive for bilateral seminal vesicle invasion. Multiple margins positive. Lymphovascular invasion not identified. Perineural invasion was noted. Lymph nodes negative. 3 were examined (2 right obturator and 1 left obturator). Completed radiation to the prostate fossa and pelvic lymph nodes at [facility] early May 2018. He received 4500 cGy in 25 fractions. He then had 2340 cGy boost in 13 fractions to the prostatic fossa, for a total dose of 6240 cGy in 38 treatment fractions delivered over 54 days. He did not receive hormonal therapy:       #ID: We will need to monitor his cough and if worsens may need reassessment with imaging.  He will continue with the current prophylaxis and will have to be put on antibacterial prophylaxis when he becomes neutropenic.  - prophy: ACV, Bactrim- he has not been taking bactrim- he says he was told to stop sometime-inquired if this is for current protocol or just 1 year post NAM NK therapy? Research RN said there is no guidance and pharmacist said they think this is 6 months so will not refill   - hx C diff colitis: finished vancomycin 6/23, treated for 7 days. Ppx bid vanco while neutropenic, now off.      #FEN/Renal: He started allopurinol today as a stated above.  Otherwise excellent oral intake.    - Creat wnl     # GI: No change to stools today if if they become watery he will need a C. difficile checking.  Still has discomfort in the abdomen when he jiggles his abdomen.  No real abdominal pain or cramps.    Fullness to left groin: Now resolved  Abdominal Pain secondary to cancer: oxycodone prn.    Transaminitis: suspect related to cancer. Limit Tylenol, etoh and monitor. Mild ALT elevation of 83 (8/20); follow.    # dental: He was seen by a dentist as described above.  The identified no infection but no repair is possible for at least the next 2 months because of scheduling.  There is a possible increased risk of infection at that site and therefore he will  need prophylactic antibiotics were neutropenic.     Final Plan:  Clinic follow-up then admit to  Phase 1 unit for 2nd cycle infusion  Wed 8/25.   Fludarabine/Cytoxan today   Continue allopurinol 300mg/d    30 minutes spent on the date of the encounter doing chart review, review of test results, interpretation of tests, patient visit, documentation and discussion with other provider(s)       Scott Zavala MD on 8/21/2021 at 8:14 AM

## 2021-08-22 NOTE — NURSING NOTE
Chief Complaint   Patient presents with     Infusion     pt here for day -3 Cytoxan and Fludarabine pre  for follicular lymphoma

## 2021-08-22 NOTE — NURSING NOTE
"Oncology Rooming Note    August 22, 2021 7:23 AM   Juvenal Blake is a 55 year old male who presents for:    Chief Complaint   Patient presents with     Blood Draw     Labs drawn via PICC by RN. VS taken.     RECHECK     pt here for recheck s/p bmt for Follicular lymphoma     Initial Vitals: /78   Pulse 70   Temp 98.1  F (36.7  C) (Oral)   Resp 16   Wt 107.6 kg (237 lb 3.2 oz)   SpO2 98%   BMI 36.58 kg/m   Estimated body mass index is 36.58 kg/m  as calculated from the following:    Height as of 8/17/21: 1.715 m (5' 7.52\").    Weight as of this encounter: 107.6 kg (237 lb 3.2 oz). Body surface area is 2.26 meters squared.  Mild Pain (2) Comment: Data Unavailable   No LMP for male patient.  Allergies reviewed: Yes  Medications reviewed: Yes    Medications: Medication refills not needed today.  Pharmacy name entered into LOCK8: [id number] - [place], [place] - [address] AT [facility]    Clinical concerns: has little uneasy stomach- no vomitting, encouraged anti nausea medications at home. Also says he has a new hemrhoids but has medications at home to help with that  Dr. Zavala was notified.      PONCHO NGUYEN RN              "

## 2021-08-22 NOTE — LETTER
8/22/2021         RE: Juvenal Blake  [address] [place] [address]        Dear Colleague,    Thank you for referring your patient, Juvenal Blake, to the [facility] BLOOD AND MARROW TRANSPLANT PROGRAM [place]. Please see a copy of my visit note below.    BMT Clinic Note      Juvenal Blake is a 55 year old male PMH significant for refractory NHL, undergoing  NK infusion. Admitted with rituxan (biosimilar) infusion reaction 6/24 and remained admitted through completion of lymphodepleting chemo and  NK cell infusion.  Currently day +54 after initiation . D+3 LD  Chemo for cycle 2 .     HPI: Here for fludarabine/Cytoxan.  Had a good day yesterday with no problems.  Still having 3-4 loose but not watery stools.  Has a little bit of hemorrhoids but not at painful.  Has a mild cough that has not changed in the last 7 days.  No tooth pain.  No fevers. Some LE edema.      ROS:  10 point ROS negative except as above.      Physical Exam:   /78   Pulse 70   Temp 98.1  F (36.7  C) (Oral)   Resp 16   Wt 107.6 kg (237 lb 3.2 oz)   SpO2 98%   BMI 36.58 kg/m    General: NAD, alert oriented  Eyes: sclera anicteric  Mouth: OP moist without lesions. R front tooth/central incisor chipped down to base per pt but has flipper in place now. No gum erythema/edema  Lungs: CTAB  Cardiovascular: RRR, no m/r/g  Abdominal/Rectal: normoactive BS, protuberant, no palpable organomegalies or masses.  Lymphatics: trace - 1-2+ LE edema bilaterally, wearing compression stockings.    Skin: no rash; large fading bruise to RUE around PICC  Neuro: non-focal  Access: R arm PICC NT -dressing changed 8/16  Port-A-Cath on the right upper chest not accessed.    ICANS: 10/10 (8/20/21)- pt sentence log is in research folder in the [facility]. Due again on 8/25.     ECOG 1     Labs:  Lab Results   Component Value Date    WBC 2.6 (L) 08/22/2021    ANEU 2.3 08/22/2021    HGB 11.0 (L) 08/22/2021    HCT 32.0 (L)  08/22/2021    PLT 80 (L) 08/22/2021     08/22/2021    POTASSIUM 4.0 08/22/2021    CHLORIDE 110 (H) 08/22/2021    CO2 27 08/22/2021     (H) 08/22/2021    BUN 10 08/22/2021    CR 0.96 08/22/2021    MAG 2.2 08/20/2021    INR 1.07 06/28/2021    BILITOTAL 0.4 08/21/2021    AST 27 08/21/2021    ALT 70 08/21/2021    ALKPHOS 86 08/21/2021    PROTTOTAL 5.8 (L) 08/21/2021    ALBUMIN 3.8 08/21/2021       I have assessed all abnormal lab values for their clinical significance and any values considered clinically significant have been addressed in the assessment and plan.         Assessment and Plan:   Juvenal Blake is a 55 year old male PMH significant for refractory NHL, undergoing  NK infusion. Admitted with rituxan (biosimilar) infusion reaction 6/24 and remained admitted through completion of lymphodepleting chemo and  NK cell infusion.    #Follicular Lymphoma: The patient is doing very well and had a good response to initial therapy.  He did have a reaction to the bio similar rituximab and that will not be used again.  Dr. Zavala discussed the pros and cons of proceeding with a second cycle of .  In particular, that he does not have any active infections but that he is tooth that is broken is a potential site of trouble.  However it will be 2 months before he is able to fix that and delaying therapy that far is not in his best interest.  He meets criteria to proceed with therapy and he is agreeable with that plan.  He will require antibiotic prophylaxis if and when neutropenic.    - uric acid 4/4 (8/17); resume allopurinol 8/20 with 2nd cycle chemo/.  Yesterday but he took allopurinol this morning.     #HEME: Counts stable and have not yet requiring transfusions.  Keep hgb >7g/dL, plt>10,000     #CV: Cardiology cleared him to proceed with no further testing.  The CT angiogram showed no lesion requiring stenting or bypass.    #: The patient had no  complaints today the patient has  some nocturia but otherwise no other active issues.  Initial radical prostatectomy and bilateral lymph node dissection. November 15, 2017. Prostatic adenocarcinoma Petros 4+3 = 7 with tertiary pattern 5. Tumor involves 45% of total surface area. Positive for extensive extraprostatic extension. Positive for bilateral seminal vesicle invasion. Multiple margins positive. Lymphovascular invasion not identified. Perineural invasion was noted. Lymph nodes negative. 3 were examined (2 right obturator and 1 left obturator). Completed radiation to the prostate fossa and pelvic lymph nodes at [facility] early May 2018. He received 4500 cGy in 25 fractions. He then had 2340 cGy boost in 13 fractions to the prostatic fossa, for a total dose of 6240 cGy in 38 treatment fractions delivered over 54 days. He did not receive hormonal therapy:       #ID: We will need to monitor his cough and if worsens may need reassessment with imaging.  He will continue with the current prophylaxis and will have to be put on antibacterial prophylaxis when he becomes neutropenic.  - prophy: ACV, Bactrim- he has not been taking bactrim- he says he was told to stop sometime-inquired if this is for current protocol or just 1 year post NAM NK therapy? Research RN said there is no guidance and pharmacist said they think this is 6 months so will not refill   - hx C diff colitis: finished vancomycin 6/23, treated for 7 days. Ppx bid vanco while neutropenic, now off.      #FEN/Renal: He started allopurinol today as a stated above.  Otherwise excellent oral intake.    - Creat wnl     # GI: No change to stools today if if they become watery he will need a C. difficile checking.  Still has discomfort in the abdomen when he jiggles his abdomen.  No real abdominal pain or cramps.    Fullness to left groin: Now resolved  Abdominal Pain secondary to cancer: oxycodone prn.    Transaminitis: suspect related to cancer. Limit Tylenol, etoh and monitor. Mild  ALT elevation of 83 (8/20); follow.    # dental: He was seen by a dentist as described above.  The identified no infection but no repair is possible for at least the next 2 months because of scheduling.  There is a possible increased risk of infection at that site and therefore he will need prophylactic antibiotics were neutropenic.     Final Plan:  Clinic follow-up then admit to  Phase 1 unit for 2nd cycle infusion  Wed 8/25.   Fludarabine/Cytoxan today   Continue allopurinol 300mg/d    30 minutes spent on the date of the encounter doing chart review, review of test results, interpretation of tests, patient visit, documentation and discussion with other provider(s)       Scott Zavala MD on 8/21/2021 at 8:14 AM               Again, thank you for allowing me to participate in the care of your patient.        Sincerely,        BMT [facility]

## 2021-08-22 NOTE — LETTER
8/22/2021         RE: Juvenal Blake  [address] [place] [address]        Dear Colleague,    Thank you for referring your patient, Juvenal Blake, to the [facility] BLOOD AND MARROW TRANSPLANT PROGRAM [place]. Please see a copy of my visit note below.    Infusion Nursing Note:  Juvenal Blake presents today for day -3 Cycle 2 Cytoxan and Fludarabine.    Patient seen by provider today: Yes: Dr. Zavala   present during visit today: Not Applicable.    Note: Patient here for scheduled chemo. He was seen and assessed by Dr. Zavala. Patient labs and vitals monitored. Patient reports some upset stomach, but no vomiting, has not taken his nausea medications at home and so encouraged him to take his PRN medications. He also report hemorrhoids again which he had in the past and has remedies to help with those as well at home. Covid swab was collected and sent to lab for his admission planned on Wednesday. IVF pre and post flush given to patient. Kyrtril as a pre-medication was given as well prior to chemo. Cytoxan was given over 2 hours and Fludarabine given over 1 with 2 RNs double checking chemo. Positive blood return was positive before and after chemo as well. All questions and concerns were answered for the patient. At the end of the Fludarabine patient notified RN that he has bent down and the picc line pulled out of the dressing and out of stat lock and out about 2 inches out of his arm. Dr. Zavala was notified and looked at picc line. Blood return still positive and also flushed with saline and heparin. He said to change dressing, flush line and contacted nurse coordinator to have his line scheduled to be assessed with IR before his visit on Wednesday. Dressing change was completed using sterile technique.       Intravenous Access:  PICC.    Treatment Conditions:  Lab Results   Component Value Date    HGB 11.0 08/22/2021    HGB 7.6 07/08/2021     Lab Results   Component  Value Date    WBC 2.6 08/22/2021    WBC 2.6 07/08/2021      Lab Results   Component Value Date    ANEU 0.5 07/08/2021     Lab Results   Component Value Date    PLT 80 08/22/2021     07/08/2021      Lab Results   Component Value Date     08/22/2021     07/08/2021                   Lab Results   Component Value Date    POTASSIUM 4.0 08/22/2021    POTASSIUM 4.2 07/08/2021           Lab Results   Component Value Date    MAG 2.2 08/20/2021    MAG 2.2 07/06/2021            Lab Results   Component Value Date    CR 0.96 08/22/2021    CR 1.03 07/08/2021                   Lab Results   Component Value Date    TRE 8.5 08/22/2021    TRE 8.9 07/08/2021                Lab Results   Component Value Date    BILITOTAL 0.4 08/21/2021    BILITOTAL 0.6 07/08/2021           Lab Results   Component Value Date    ALBUMIN 3.8 08/21/2021    ALBUMIN 2.8 07/08/2021                    Lab Results   Component Value Date    ALT 70 08/21/2021    ALT 48 07/08/2021           Lab Results   Component Value Date    AST 27 08/21/2021    AST 24 07/08/2021       Results reviewed, labs MET treatment parameters, ok to proceed with treatment.      Post Infusion Assessment:  Patient tolerated infusion without incident.  Blood return noted pre and post infusion.  Site patent and intact, free from redness, edema or discomfort.  No evidence of extravasations.  Access discontinued per protocol.       Discharge Plan:   Patient discharged in stable condition accompanied by: wife.  Departure Mode: Ambulatory.      PONCHO NGUYEN RN                          Again, thank you for allowing me to participate in the care of your patient.        Sincerely,        [facility]

## 2021-08-22 NOTE — PROGRESS NOTES
Infusion Nursing Note:  Juvenal lBake presents today for day -3 Cycle 2 Cytoxan and Fludarabine.    Patient seen by provider today: Yes: Dr. Zavala   present during visit today: Not Applicable.    Note: Patient here for scheduled chemo. He was seen and assessed by Dr. Zavala. Patient labs and vitals monitored. Patient reports some upset stomach, but no vomiting, has not taken his nausea medications at home and so encouraged him to take his PRN medications. He also report hemorrhoids again which he had in the past and has remedies to help with those as well at home. Covid swab was collected and sent to lab for his admission planned on Wednesday. IVF pre and post flush given to patient. Kyrtril as a pre-medication was given as well prior to chemo. Cytoxan was given over 2 hours and Fludarabine given over 1 with 2 RNs double checking chemo. Positive blood return was positive before and after chemo as well. All questions and concerns were answered for the patient. At the end of the Fludarabine patient notified RN that he has bent down and the picc line pulled out of the dressing and out of stat lock and out about 2 inches out of his arm. Dr. Zavala was notified and looked at picc line. Blood return still positive and also flushed with saline and heparin. He said to change dressing, flush line and contacted nurse coordinator to have his line scheduled to be assessed with IR before his visit on Wednesday. Dressing change was completed using sterile technique.       Intravenous Access:  PICC.    Treatment Conditions:  Lab Results   Component Value Date    HGB 11.0 08/22/2021    HGB 7.6 07/08/2021     Lab Results   Component Value Date    WBC 2.6 08/22/2021    WBC 2.6 07/08/2021      Lab Results   Component Value Date    ANEU 0.5 07/08/2021     Lab Results   Component Value Date    PLT 80 08/22/2021     07/08/2021      Lab Results   Component Value Date     08/22/2021     07/08/2021                    Lab Results   Component Value Date    POTASSIUM 4.0 08/22/2021    POTASSIUM 4.2 07/08/2021           Lab Results   Component Value Date    MAG 2.2 08/20/2021    MAG 2.2 07/06/2021            Lab Results   Component Value Date    CR 0.96 08/22/2021    CR 1.03 07/08/2021                   Lab Results   Component Value Date    TRE 8.5 08/22/2021    TRE 8.9 07/08/2021                Lab Results   Component Value Date    BILITOTAL 0.4 08/21/2021    BILITOTAL 0.6 07/08/2021           Lab Results   Component Value Date    ALBUMIN 3.8 08/21/2021    ALBUMIN 2.8 07/08/2021                    Lab Results   Component Value Date    ALT 70 08/21/2021    ALT 48 07/08/2021           Lab Results   Component Value Date    AST 27 08/21/2021    AST 24 07/08/2021       Results reviewed, labs MET treatment parameters, ok to proceed with treatment.      Post Infusion Assessment:  Patient tolerated infusion without incident.  Blood return noted pre and post infusion.  Site patent and intact, free from redness, edema or discomfort.  No evidence of extravasations.  Access discontinued per protocol.       Discharge Plan:   Patient discharged in stable condition accompanied by: wife.  Departure Mode: Ambulatory.      PONCHO NGUYEN RN

## 2021-08-23 ENCOUNTER — TELEPHONE (OUTPATIENT)
Dept: TRANSPLANT | Facility: CLINIC | Age: 56
End: 2021-08-23

## 2021-08-23 DIAGNOSIS — C82.90 FOLLICULAR LYMPHOMA (H): Primary | ICD-10-CM

## 2021-08-24 ENCOUNTER — ANESTHESIA (OUTPATIENT)
Dept: SURGERY | Facility: AMBULATORY SURGERY CENTER | Age: 56
End: 2021-08-24

## 2021-08-24 ENCOUNTER — HOSPITAL ENCOUNTER (OUTPATIENT)
Facility: AMBULATORY SURGERY CENTER | Age: 56
Discharge: HOME | End: 2021-08-24
Attending: RADIOLOGY | Admitting: RADIOLOGY
Payer: COMMERCIAL

## 2021-08-24 ENCOUNTER — ANESTHESIA EVENT (OUTPATIENT)
Dept: SURGERY | Facility: AMBULATORY SURGERY CENTER | Age: 56
End: 2021-08-24

## 2021-08-24 ENCOUNTER — ANCILLARY PROCEDURE (OUTPATIENT)
Dept: RADIOLOGY | Facility: AMBULATORY SURGERY CENTER | Age: 56
End: 2021-08-24
Attending: INTERNAL MEDICINE
Payer: COMMERCIAL

## 2021-08-24 VITALS
BODY MASS INDEX: 38.04 KG/M2 | WEIGHT: 251 LBS | OXYGEN SATURATION: 97 % | HEIGHT: 68 IN | RESPIRATION RATE: 16 BRPM | HEART RATE: 64 BPM | DIASTOLIC BLOOD PRESSURE: 73 MMHG | TEMPERATURE: 97.9 F | SYSTOLIC BLOOD PRESSURE: 118 MMHG

## 2021-08-24 DIAGNOSIS — C82.90 FOLLICULAR LYMPHOMA (H): ICD-10-CM

## 2021-08-24 DIAGNOSIS — C82.90: ICD-10-CM

## 2021-08-24 PROCEDURE — 36584 COMPL RPLCMT PICC RS&I: CPT | Performed by: RADIOLOGY

## 2021-08-24 PROCEDURE — 36584 COMPL RPLCMT PICC RS&I: CPT

## 2021-08-24 DEVICE — IMPLANTABLE DEVICE: Type: IMPLANTABLE DEVICE | Site: NECK | Status: FUNCTIONAL

## 2021-08-24 RX ORDER — HEPARIN SODIUM,PORCINE 10 UNIT/ML
VIAL (ML) INTRAVENOUS PRN
Status: DISCONTINUED | OUTPATIENT
Start: 2021-08-24 | End: 2021-08-24 | Stop reason: HOSPADM

## 2021-08-24 ASSESSMENT — MIFFLIN-ST. JEOR: SCORE: 1938.03

## 2021-08-24 NOTE — H&P
Patient presents for PICC exchange, revision, or placement of new PICC.    No interval change to H&P.    Consent obtained.

## 2021-08-24 NOTE — BRIEF OP NOTE
[facility] [place]    Brief Operative Note    Pre-operative diagnosis: Lymphoma, follicular (H) [C82.90]  Post-operative diagnosis Same as pre-operative diagnosis    Procedure: Procedure(s):  INSERTION, PICC EXCHANGE, PREVIOUS PICC IS OUT 10 CENTIMETERS  Surgeon: Surgeon(s) and Role:     * Christiano Murdock MD - Primary     * Rolanda Prabhakar PA-C - Assisting  Anesthesia: Local   Estimated blood loss: Minimal  Drains: None  Specimens: * No specimens in log *  Findings:   Exchanged existing PICC for new 5 Fr 40 cm dual lumen PICC  Complications: None.  Implants:   Implant Name Type Inv. Item Serial No.  Lot No. LRB No. Used Action   CATH VA POWER PICC 5FR DL [id number] - [id number] Catheter CATH VA POWER PICC 5FR DL [id number]  [facility] [id number] Right 1 Used as a Supply

## 2021-08-24 NOTE — BRIEF OP NOTE
[facility] [place]    Brief Operative Note    Pre-operative diagnosis: Lymphoma, follicular (H) [C82.90]  Post-operative diagnosis Same as pre-operative diagnosis    Procedure: Procedure(s):  INSERTION, PICC EXCHANGE, PREVIOUS PICC IS OUT 10 CENTIMETERS  Surgeon: Surgeon(s) and Role:     * Christiano Murdock MD - Primary     * Rolanda Prabhakar PA-C - Assisting  Anesthesia: Local   Estimated blood loss: Minimal  Drains: None  Specimens: * No specimens in log *  Findings:   None.  Complications: None.  Implants:   Implant Name Type Inv. Item Serial No.  Lot No. LRB No. Used Action   CATH VA POWER PICC 5FR DL [id number] - [id number] Catheter CATH VA POWER PICC 5FR DL [id number]  [employer] [id number] Right 1 Used as a Supply       Removed previous PICC over guidewire. 47 cm marker noted on the PICC. Catheter length measured with a guidewire. 40 cm 5 Serbian BD Bard double-lumen PowerPICC placed via existing venotomy. Tip in the high right atrium. Secured with sterile dressings. Heparin locked and ready for immediate use.

## 2021-08-24 NOTE — DISCHARGE INSTRUCTIONS
A collaboration between [facility] and [facility]  Experts in minimally invasive, targeted treatments performed using imaging guidance    Venous Access Device,  Port Catheter or Tunneled or Non-Tunneled Central Line Placement    Today you had a procedure today to install a venous access device; either a tunneled central vein catheter or a subcutaneous port catheter.    After you go home:  - Drink plenty of fluids.  Generally 6-8 (8 ounce) glasses a day is recommended.  - Resume your regular diet unless otherwise ordered by a medical provider.  - Keep any applied tape/gauze dressings clean and dry.  Change tape/gauze dressings if they get wet or soiled.  - You may shower the following day after procedure, however cover and protect from moisture any tape/gauze dressings.  You may let water hit and run over dried skin glue, but do not scrub.  Pat the area dry after showering.  - Port placement incisions are closed with absorbable suture, meaning they do not need to be removed at a later date, and a topical skin adhesive (skin glue).  This glue will wear off in 7-14 days.  Do not remove before this time.  If 14 days have passed and residual glue is present, you may gently remove it.  - Do not apply gels, lotions, or ointments to the glue site for the first 10 days as this may cause the glue to prematurely soften and fail.  - Do not perform strenuous activities or lift greater than 10 pounds for the next three days.  - If there is bleeding or oozing from the procedure site, apply firm pressure to the area for 5-10 minutes.  If the bleeding continues seek medical advice at the numbers below.  - Mild procedure site discomfort can be treated with an ice pack and over-the-counter pain relievers.        For 24 hours after any sedation used:  - Relax and take it easy.  No strenuous activities.  - Do not drive or operate machines at home or at work.  - No alcohol  consumption.  - Do not make any important or legal decisions.    Call our Interventional Radiology (IR) service if:  - If you start bleeding from the procedure site.  If you do start to bleed from the site, lie down and hold some pressure on the site.  Our radiology provider can help you decide if you need to return to the hospital.  - If you have new or worsening pain related to the procedure.  - If you have concerning swelling at the procedure site.  - If you develop persistent nausea or vomiting.  - If you develop hives or a rash or any unexplained itching.  - If you have a fever of greater than 100.5  F and chills in the first 5 days after procedure.  - Any other concerns related to your procedure.      [facility]  Interventional Radiology (IR)  [address] [facility] [place] [address]    Contact Number:  [phone]  (IR control desk)  - Monday - Friday 8:00 am - 4:30 pm    After hours for urgent concerns:  [phone]  - After 4:30 pm Monday - Friday, Weekends and Holidays.   - Ask for Interventional Radiology on-call.  Someone is available 24 hours a day.  - [facility] toll free number:  [phone]

## 2021-08-25 ENCOUNTER — RESEARCH ENCOUNTER (OUTPATIENT)
Dept: TRANSPLANT | Facility: CLINIC | Age: 56
End: 2021-08-25

## 2021-08-25 ENCOUNTER — HOSPITAL ENCOUNTER (OUTPATIENT)
Facility: CLINIC | Age: 56
Discharge: HOME | End: 2021-08-25
Payer: COMMERCIAL

## 2021-08-25 ENCOUNTER — ONCOLOGY VISIT (OUTPATIENT)
Dept: TRANSPLANT | Facility: CLINIC | Age: 56
End: 2021-08-25
Attending: PHYSICIAN ASSISTANT
Payer: COMMERCIAL

## 2021-08-25 ENCOUNTER — LAB (OUTPATIENT)
Dept: LAB | Facility: CLINIC | Age: 56
End: 2021-08-25
Attending: PHYSICIAN ASSISTANT
Payer: COMMERCIAL

## 2021-08-25 VITALS
SYSTOLIC BLOOD PRESSURE: 123 MMHG | WEIGHT: 232.2 LBS | HEART RATE: 76 BPM | DIASTOLIC BLOOD PRESSURE: 75 MMHG | TEMPERATURE: 98 F | OXYGEN SATURATION: 98 % | BODY MASS INDEX: 35.31 KG/M2 | RESPIRATION RATE: 16 BRPM

## 2021-08-25 VITALS
DIASTOLIC BLOOD PRESSURE: 76 MMHG | RESPIRATION RATE: 22 BRPM | TEMPERATURE: 97.1 F | OXYGEN SATURATION: 99 % | HEART RATE: 74 BPM | SYSTOLIC BLOOD PRESSURE: 125 MMHG

## 2021-08-25 DIAGNOSIS — C82.08 FOLLICULAR LYMPHOMA GRADE I, LYMPH NODES OF MULTIPLE SITES (H): Primary | ICD-10-CM

## 2021-08-25 LAB
ALBUMIN SERPL-MCNC: 3.8 G/DL (ref 3.4–5)
ALBUMIN UR-MCNC: NEGATIVE MG/DL
ALP SERPL-CCNC: 91 U/L (ref 40–150)
ALT SERPL W P-5'-P-CCNC: 46 U/L (ref 0–70)
ANION GAP SERPL CALCULATED.3IONS-SCNC: 9 MMOL/L (ref 3–14)
APPEARANCE UR: CLEAR
AST SERPL W P-5'-P-CCNC: 25 U/L (ref 0–45)
BASOPHILS # BLD MANUAL: 0 10E3/UL (ref 0–0.2)
BASOPHILS NFR BLD MANUAL: 0 %
BILIRUB DIRECT SERPL-MCNC: 0.2 MG/DL (ref 0–0.2)
BILIRUB SERPL-MCNC: 0.7 MG/DL (ref 0.2–1.3)
BILIRUB UR QL STRIP: NEGATIVE
BUN SERPL-MCNC: 16 MG/DL (ref 7–30)
C COLI+JEJUNI+LARI FUSA STL QL NAA+PROBE: NOT DETECTED
C DIFF TOX B STL QL: NEGATIVE
CALCIUM SERPL-MCNC: 9.6 MG/DL (ref 8.5–10.1)
CHLORIDE BLD-SCNC: 106 MMOL/L (ref 94–109)
CO2 SERPL-SCNC: 28 MMOL/L (ref 20–32)
COLOR UR AUTO: YELLOW
CREAT SERPL-MCNC: 0.86 MG/DL (ref 0.66–1.25)
CRP SERPL-MCNC: 4.2 MG/L (ref 0–8)
EC STX1 GENE STL QL NAA+PROBE: NOT DETECTED
EC STX2 GENE STL QL NAA+PROBE: NOT DETECTED
EOSINOPHIL # BLD MANUAL: 0.1 10E3/UL (ref 0–0.7)
EOSINOPHIL NFR BLD MANUAL: 4 %
ERYTHROCYTE [DISTWIDTH] IN BLOOD BY AUTOMATED COUNT: 13.6 % (ref 10–15)
FERRITIN SERPL-MCNC: 227 NG/ML (ref 26–388)
GFR SERPL CREATININE-BSD FRML MDRD: >90 ML/MIN/1.73M2
GLUCOSE BLD-MCNC: 103 MG/DL (ref 70–99)
GLUCOSE UR STRIP-MCNC: NEGATIVE MG/DL
HCT VFR BLD AUTO: 31.5 % (ref 40–53)
HGB BLD-MCNC: 11.1 G/DL (ref 13.3–17.7)
HGB UR QL STRIP: NEGATIVE
KETONES UR STRIP-MCNC: NEGATIVE MG/DL
LDH SERPL L TO P-CCNC: 226 U/L (ref 85–227)
LEUKOCYTE ESTERASE UR QL STRIP: NEGATIVE
LYMPHOCYTES # BLD MANUAL: 0.1 10E3/UL (ref 0.8–5.3)
LYMPHOCYTES NFR BLD MANUAL: 9 %
MAGNESIUM SERPL-MCNC: 2 MG/DL (ref 1.6–2.3)
MCH RBC QN AUTO: 32.2 PG (ref 26.5–33)
MCHC RBC AUTO-ENTMCNC: 35.2 G/DL (ref 31.5–36.5)
MCV RBC AUTO: 91 FL (ref 78–100)
MONOCYTES # BLD MANUAL: 0 10E3/UL (ref 0–1.3)
MONOCYTES NFR BLD MANUAL: 3 %
NEUTROPHILS # BLD MANUAL: 1.3 10E3/UL (ref 1.6–8.3)
NEUTROPHILS NFR BLD MANUAL: 84 %
NITRATE UR QL: NEGATIVE
NOROV GI+II ORF1-ORF2 JNC STL QL NAA+PR: NOT DETECTED
NRBC # BLD AUTO: 0 10E3/UL
NRBC BLD MANUAL-RTO: 1 %
PH UR STRIP: 6 [PH] (ref 5–7)
PHOSPHATE SERPL-MCNC: 2.3 MG/DL (ref 2.5–4.5)
PLAT MORPH BLD: ABNORMAL
PLATELET # BLD AUTO: 69 10E3/UL (ref 150–450)
POTASSIUM BLD-SCNC: 3.9 MMOL/L (ref 3.4–5.3)
PROT SERPL-MCNC: 6.4 G/DL (ref 6.8–8.8)
RBC # BLD AUTO: 3.45 10E6/UL (ref 4.4–5.9)
RBC MORPH BLD: ABNORMAL
RBC URINE: 1 /HPF
RVA NSP5 STL QL NAA+PROBE: NOT DETECTED
SALMONELLA SP RPOD STL QL NAA+PROBE: NOT DETECTED
SHIGELLA SP+EIEC IPAH STL QL NAA+PROBE: NOT DETECTED
SODIUM SERPL-SCNC: 143 MMOL/L (ref 133–144)
SP GR UR STRIP: 1.01 (ref 1–1.03)
SQUAMOUS EPITHELIAL: <1 /HPF
URATE SERPL-MCNC: 2.6 MG/DL (ref 3.5–7.2)
UROBILINOGEN UR STRIP-MCNC: NORMAL MG/DL
V CHOL+PARA RFBL+TRKH+TNAA STL QL NAA+PR: NOT DETECTED
WBC # BLD AUTO: 1.5 10E3/UL (ref 4–11)
WBC URINE: <1 /HPF
Y ENTERO RECN STL QL NAA+PROBE: NOT DETECTED

## 2021-08-25 PROCEDURE — 250N000013 HC RX MED GY IP 250 OP 250 PS 637

## 2021-08-25 PROCEDURE — G0463 HOSPITAL OUTPT CLINIC VISIT: HCPCS | Mod: 25

## 2021-08-25 PROCEDURE — 80053 COMPREHEN METABOLIC PANEL: CPT | Performed by: INTERNAL MEDICINE

## 2021-08-25 PROCEDURE — 36592 COLLECT BLOOD FROM PICC: CPT | Performed by: INTERNAL MEDICINE

## 2021-08-25 PROCEDURE — 96374 THER/PROPH/DIAG INJ IV PUSH: CPT

## 2021-08-25 PROCEDURE — 99214 OFFICE O/P EST MOD 30 MIN: CPT

## 2021-08-25 PROCEDURE — 87506 IADNA-DNA/RNA PROBE TQ 6-11: CPT | Performed by: PHYSICIAN ASSISTANT

## 2021-08-25 PROCEDURE — 85027 COMPLETE CBC AUTOMATED: CPT | Performed by: INTERNAL MEDICINE

## 2021-08-25 PROCEDURE — 82248 BILIRUBIN DIRECT: CPT | Performed by: INTERNAL MEDICINE

## 2021-08-25 PROCEDURE — 84550 ASSAY OF BLOOD/URIC ACID: CPT

## 2021-08-25 PROCEDURE — 250N000013 HC RX MED GY IP 250 OP 250 PS 637: Performed by: INTERNAL MEDICINE

## 2021-08-25 PROCEDURE — 84100 ASSAY OF PHOSPHORUS: CPT | Performed by: INTERNAL MEDICINE

## 2021-08-25 PROCEDURE — 81001 URINALYSIS AUTO W/SCOPE: CPT | Performed by: INTERNAL MEDICINE

## 2021-08-25 PROCEDURE — 258N000003 HC RX IP 258 OP 636: Performed by: INTERNAL MEDICINE

## 2021-08-25 PROCEDURE — 82728 ASSAY OF FERRITIN: CPT | Performed by: INTERNAL MEDICINE

## 2021-08-25 PROCEDURE — 258N000003 HC RX IP 258 OP 636: Performed by: PHYSICIAN ASSISTANT

## 2021-08-25 PROCEDURE — 87493 C DIFF AMPLIFIED PROBE: CPT | Performed by: PHYSICIAN ASSISTANT

## 2021-08-25 PROCEDURE — 96372 THER/PROPH/DIAG INJ SC/IM: CPT | Mod: XS

## 2021-08-25 PROCEDURE — 99001 SPECIMEN HANDLING PT-LAB: CPT | Performed by: INTERNAL MEDICINE

## 2021-08-25 PROCEDURE — 86140 C-REACTIVE PROTEIN: CPT | Performed by: INTERNAL MEDICINE

## 2021-08-25 PROCEDURE — 83615 LACTATE (LD) (LDH) ENZYME: CPT | Performed by: INTERNAL MEDICINE

## 2021-08-25 PROCEDURE — 83735 ASSAY OF MAGNESIUM: CPT | Performed by: INTERNAL MEDICINE

## 2021-08-25 PROCEDURE — 250N000011 HC RX IP 250 OP 636: Performed by: PHYSICIAN ASSISTANT

## 2021-08-25 PROCEDURE — G0463 HOSPITAL OUTPT CLINIC VISIT: HCPCS

## 2021-08-25 RX ORDER — ACETAMINOPHEN 325 MG/1
650 TABLET ORAL EVERY 4 HOURS
Status: COMPLETED | OUTPATIENT
Start: 2021-08-25 | End: 2021-08-25

## 2021-08-25 RX ORDER — NALOXONE HYDROCHLORIDE 0.4 MG/ML
0.2 INJECTION, SOLUTION INTRAMUSCULAR; INTRAVENOUS; SUBCUTANEOUS
Status: DISCONTINUED | OUTPATIENT
Start: 2021-08-25 | End: 2021-08-25 | Stop reason: HOSPADM

## 2021-08-25 RX ORDER — DIPHENHYDRAMINE HCL 25 MG
25 CAPSULE ORAL ONCE
Status: DISCONTINUED | OUTPATIENT
Start: 2021-08-25 | End: 2021-08-25 | Stop reason: HOSPADM

## 2021-08-25 RX ORDER — PROCHLORPERAZINE MALEATE 5 MG
5 TABLET ORAL EVERY 6 HOURS PRN
Status: DISCONTINUED | OUTPATIENT
Start: 2021-08-25 | End: 2021-08-25 | Stop reason: HOSPADM

## 2021-08-25 RX ORDER — EPINEPHRINE 1 MG/ML
0.3 INJECTION, SOLUTION, CONCENTRATE INTRAVENOUS EVERY 5 MIN PRN
Status: DISCONTINUED | OUTPATIENT
Start: 2021-08-25 | End: 2021-08-25 | Stop reason: HOSPADM

## 2021-08-25 RX ORDER — DIPHENHYDRAMINE HCL 25 MG
25 CAPSULE ORAL EVERY 4 HOURS
Status: COMPLETED | OUTPATIENT
Start: 2021-08-25 | End: 2021-08-25

## 2021-08-25 RX ORDER — HEPARIN SODIUM,PORCINE 10 UNIT/ML
5 VIAL (ML) INTRAVENOUS ONCE
Status: COMPLETED | OUTPATIENT
Start: 2021-08-25 | End: 2021-08-25

## 2021-08-25 RX ORDER — ACETAMINOPHEN 325 MG/1
650 TABLET ORAL ONCE
Status: DISCONTINUED | OUTPATIENT
Start: 2021-08-25 | End: 2021-08-25 | Stop reason: HOSPADM

## 2021-08-25 RX ORDER — METHYLPREDNISOLONE SODIUM SUCCINATE 125 MG/2ML
125 INJECTION, POWDER, LYOPHILIZED, FOR SOLUTION INTRAMUSCULAR; INTRAVENOUS
Status: DISCONTINUED | OUTPATIENT
Start: 2021-08-25 | End: 2021-08-25 | Stop reason: HOSPADM

## 2021-08-25 RX ORDER — MEPERIDINE HYDROCHLORIDE 25 MG/ML
25 INJECTION INTRAMUSCULAR; INTRAVENOUS; SUBCUTANEOUS EVERY 30 MIN PRN
Status: DISCONTINUED | OUTPATIENT
Start: 2021-08-25 | End: 2021-08-25 | Stop reason: HOSPADM

## 2021-08-25 RX ORDER — ALBUTEROL SULFATE 0.83 MG/ML
2.5 SOLUTION RESPIRATORY (INHALATION)
Status: DISCONTINUED | OUTPATIENT
Start: 2021-08-25 | End: 2021-08-25 | Stop reason: HOSPADM

## 2021-08-25 RX ORDER — MEPERIDINE HYDROCHLORIDE 25 MG/ML
25-50 INJECTION INTRAMUSCULAR; INTRAVENOUS; SUBCUTANEOUS
Status: DISCONTINUED | OUTPATIENT
Start: 2021-08-25 | End: 2021-08-25 | Stop reason: HOSPADM

## 2021-08-25 RX ORDER — DIPHENHYDRAMINE HYDROCHLORIDE 50 MG/ML
50 INJECTION INTRAMUSCULAR; INTRAVENOUS
Status: DISCONTINUED | OUTPATIENT
Start: 2021-08-25 | End: 2021-08-25 | Stop reason: HOSPADM

## 2021-08-25 RX ORDER — ALBUTEROL SULFATE 90 UG/1
1-2 AEROSOL, METERED RESPIRATORY (INHALATION)
Status: DISCONTINUED | OUTPATIENT
Start: 2021-08-25 | End: 2021-08-25 | Stop reason: HOSPADM

## 2021-08-25 RX ADMIN — DIPHENHYDRAMINE HYDROCHLORIDE 25 MG: 25 CAPSULE ORAL at 10:38

## 2021-08-25 RX ADMIN — SODIUM CHLORIDE 250 ML: 9 INJECTION, SOLUTION INTRAVENOUS at 12:27

## 2021-08-25 RX ADMIN — POTASSIUM & SODIUM PHOSPHATES POWDER PACK 280-160-250 MG 1 PACKET: 280-160-250 PACK at 14:37

## 2021-08-25 RX ADMIN — SODIUM CHLORIDE 1000 ML: 9 INJECTION, SOLUTION INTRAVENOUS at 09:35

## 2021-08-25 RX ADMIN — PROCHLORPERAZINE EDISYLATE 5 MG: 5 INJECTION INTRAMUSCULAR; INTRAVENOUS at 08:22

## 2021-08-25 RX ADMIN — ACETAMINOPHEN 650 MG: 325 TABLET, FILM COATED ORAL at 10:38

## 2021-08-25 RX ADMIN — ACETAMINOPHEN 650 MG: 325 TABLET, FILM COATED ORAL at 14:37

## 2021-08-25 RX ADMIN — Medication 5 ML: at 07:32

## 2021-08-25 RX ADMIN — SODIUM CHLORIDE 250 ML: 9 INJECTION, SOLUTION INTRAVENOUS at 10:41

## 2021-08-25 RX ADMIN — DIPHENHYDRAMINE HYDROCHLORIDE 25 MG: 25 CAPSULE ORAL at 14:37

## 2021-08-25 ASSESSMENT — PAIN SCALES - GENERAL: PAINLEVEL: MILD PAIN (2)

## 2021-08-25 NOTE — LETTER
8/25/2021         RE: Juvenal Blake  [address] [place] [address]        Dear Colleague,    Thank you for referring your patient, Juvenal Blake, to the [facility] [place]. Please see a copy of my visit note below.    BMT Clinic Note      Juvenal Blake is a 55 year old male PMH significant for refractory NHL, undergoing  NK infusion. Admitted with rituxan (biosimilar) infusion reaction 6/24 and remained admitted through completion of lymphodepleting chemo and  NK cell infusion.  Currently day +58 after initiation .  Post LD chemo and Day +1 cycle 2 .     HPI: Here for visit and then goes to  for his  cells. Having diarrhea now, 2-3 stools.  No abdominal cramping with stools.  Having some nausea with decreased ability to drink fluid and eat.  Hemorrhoids are the same- using wipes and cream. Occasional cough, stable. Sob with exertion, same.  No fevers. No tooth pain.  Still pain in mid abdomen when goes over bump, unchanged  Will give him 1 liter of NS on floor.  Gave him some compazine in clinic for the nausea.  Still taking the allopurinol. No transfusions.    ROS:  10 point ROS negative except as above.      Physical Exam:     Blood pressure 123/75, pulse 76, temperature 98  F (36.7  C), temperature source Oral, resp. rate 16, weight 105.3 kg (232 lb 3.2 oz), SpO2 98 %.    Wt Readings from Last 4 Encounters:   08/25/21 105.3 kg (232 lb 3.2 oz)   08/24/21 113.9 kg (251 lb)   08/22/21 107.6 kg (237 lb 3.2 oz)   08/21/21 107.3 kg (236 lb 9.6 oz)       General: NAD, alert oriented  Head/ears:  head is normocephalic and atraumatic without tenderness, ears: The tympanic membrane is normal in appearance with normal landmarks and cone of light.  Eyes: sclera anicteric, noninjected  Mouth: OP moist without lesions. R front tooth/central incisor chipped down to base per pt but has flipper in place now. No gum erythema/edema  Lungs:  CTAB, coughed away some course sound on right  Cardiovascular: RRR, no m/r/g  Abdominal/Rectal: normoactive BS, protuberant, no palpable organomegalies or masses.  Lymphatics: trace - 1-+ LE edema bilaterally, .    Skin: no rash; some bruising  Musculoskeletal and joints: No joint swelling, 5/5 strength, equal, moving all joints and limbs  LN: no palpable LN in neck, supraclavicular, axillary and left groin, maybe something in right groin, nothing discrete  Neuro: non-focal, nose to finger intact, gait is normal , CN II-XII intact  Access: R arm PICC NT - no sign of infection arm is not swollen  Port-A-Cath on the right upper chest not accessed.      ICANS: 10/10 (8/25/21)- pt sentence log is in research folder in the [facility].      ECOG 1   8/25/2021    Labs:  Lab Results   Component Value Date    WBC 2.6 (L) 08/22/2021    ANEU 2.3 08/22/2021    HGB 11.0 (L) 08/22/2021    HCT 32.0 (L) 08/22/2021    PLT 80 (L) 08/22/2021     08/22/2021    POTASSIUM 4.0 08/22/2021    CHLORIDE 110 (H) 08/22/2021    CO2 27 08/22/2021     (H) 08/22/2021    BUN 10 08/22/2021    CR 0.96 08/22/2021    MAG 2.2 08/20/2021    INR 1.07 06/28/2021    BILITOTAL 0.4 08/21/2021    AST 27 08/21/2021    ALT 70 08/21/2021    ALKPHOS 86 08/21/2021    PROTTOTAL 5.8 (L) 08/21/2021    ALBUMIN 3.8 08/21/2021       I have assessed all abnormal lab values for their clinical significance and any values considered clinically significant have been addressed in the assessment and plan.         Assessment and Plan:   Juvenal Blake is a 55 year old male PMH significant for refractory NHL, undergoing  NK infusion. Admitted with rituxan (biosimilar) infusion reaction 6/24 and remained admitted through completion of lymphodepleting chemo and  NK cell infusion.    1.Follicular Lymphoma:   -had a good response to initial therapy.    -post reaction to the bio similar rituximab and that will not be used again.    -Dr. Zavala discussed the pros and  cons of proceeding with a second cycle of .  Per protocol he should not have any active infections :he has  tooth that is broken which could be a potential site for infection.  However it will be 2 months before he is able to fix his tooth and delaying therapy that long is not in his best interest.  He meets criteria to proceed with therapy and he is agreeable with the plan.    -8/22 UA=3.9, resumed allopurinol   -completed LD chemo   -8/25-  NK infusion today    2.HEME: Counts stable   Keep hgb >7g/dL, plt>10,000- no transfusions today       3.CV: Cardiology cleared him to proceed with no further testing.  The CT angiogram showed no lesion requiring stenting or bypass.    4. : The patient had no  complaints today the patient has some nocturia but otherwise no other active issues.  -Post radical prostatectomy and bilateral lymph node dissection. November 15, 2017. Prostatic adenocarcinoma [place] 4+3 = 7 with tertiary pattern 5. Tumor involves 45% of total surface area. Positive for extensive extraprostatic extension. Positive for bilateral seminal vesicle invasion. Multiple margins positive. Lymphovascular invasion not identified. Perineural invasion was noted. Lymph nodes negative. 3 were examined (2 right obturator and 1 left obturator). Completed radiation to the prostate fossa and pelvic lymph nodes at [facility] early May 2018. He received 4500 cGy in 25 fractions. He then had 2340 cGy boost in 13 fractions to the prostatic fossa, for a total dose of 6240 cGy in 38 treatment fractions delivered over 54 days. He did not receive hormonal therapy:       5. ID:  Afebrile.   monitor his cough and if worsens may need reassessment with imaging. - prophy: ACV,   - hx C diff colitis: finished vancomycin 6/23, treated for 7 days. Ppx bid vanco while neutropenic, now off.  See GI below.  -Bactrim: he has not been taking bactrim- he says he was told to stop sometime-inquired if this is for current  protocol or just 1 year post NAM NK therapy? Research RN said there is no guidance and pharmacist said they think this is 6 months post NAM NK, reviewed 2019-25 FATE and says prophy per institutional standard    6.FEN/Renal: - Creat wnl- not drinking as much so will give 1 liter of NS today on 5C,  -hypophosphatemia, called 5C and they will replace     7. GI:   -nausea after LD chemo, prn compazine is helping.  -Diarrhea: now with diarrhea.  Repeat c diff, enteric, and adeno today.  Called an spoke with charge nurse today, they will collect on floor or send home with correct contains  -Abdominal Pain secondary to cancer, mid abdomen when goes over bump: oxycodone prn.    -Transaminitis: suspect related to cancer. Limit Tylenol, etoh and monitor. Mild ALT elevation of 83 (8/20), now back in normal range.    8. Dental: He was seen by a dentist as described in previous notes.  The identified no infection but no repair is possible for at least the next 2 months because of scheduling.  There is a possible increased risk of infection at that site and therefore he will need prophylactic antibiotics when neutropenic.     Final Plan:  Clinic follow-up then  to  Phase 1 unit for 2nd cycle infusion  Wed 8/25, today.   Collect stool on floor if able or send home with correct containers/hat to collect stool, for c diff, enteric and adeno  Give 1 liter of NS on floor and give compazine in clinic for nausea before he leaves    30 minutes spent on the date of the encounter doing chart review, review of test results, interpretation of tests, patient visit, documentation and discussion with other provider(s)       Glory Covarrubias PA-C on 8/21/2021 at 8:14 AM               Again, thank you for allowing me to participate in the care of your patient.        Sincerely,        BMT Advanced Practice Provider

## 2021-08-25 NOTE — PROGRESS NOTES
[id number]: Study Visit Note   Subject name: Juvenal Blake     Visit: Cycle 2 Day 1     Did the study visit occur within the appropriate window allowed by the protocol? yes    Since the last study visit he states that he's feeling more nauseated and run down from the chemotherapy. He states he feels similarly to what he did in Cycle 1. Was sitting up in bed watching a movie with his wife during  infusion. Patient tolerated infusion well. Denied any complaints.     Sent message to Glory Covarrubias with protocol driven instructions for this visit. Also discussed with Penny Garcia, who is managing patient in Phase 1 bed today.     I assisted bedside RN with  infusion today, and provided her with source documentation sheet with instructions on protocol mandated timing of vital signs,  post meds, and IL-2 administration times. Bedside RN stated that IL-2 will be given at 1500, which meets protocol window.     Juvenal was instructed to take tylenol and benadryl this evening around 1830.     I have personally interviewed Juvenal Blake and reviewed his medical record for adverse events and concomitant medications and these have been recorded on the corresponding logs in Juvenal Blake's research file.     Juvenal Blake was given the opportunity to ask any trial related questions.  Please see provider progress note for physical exam and other clinical information. Labs were reviewed - any significant lab values were addressed and reviewed.    Emilia Castillo RN

## 2021-08-25 NOTE — PROGRESS NOTES
Pt ambulates to infusion center for lab draw prior to NP visit.  IVAD accessed, labs drawn et sent.  Pt was seen by ARIS Kurtz CNP today and orders were approved.  Pt returns to infusion center for treatment.  Pre-meds and Rituxan administered as ordered without difficulty.  IVAD flushed w/NS et heparin and needle deaccessed.  Pt left clinic stable to [facility].  Plan RTC as scheduled.  
The patient is a 64y Male complaining of chest discomfort.

## 2021-08-25 NOTE — PROGRESS NOTES
Admission to Early Phase Research Unit    August 25, 2021    Clinical Trial: , C2 dose 1.    Principle Investigator: Scott Zavala    Research Nurse: Sarah Beth Kessler    Common Side Effects & Recommended Intervention(s):      Juvenal Blake was admitted to the early phase research unit at the [facility] for administration of therapy and clinical monitoring. The patient has been assessed by a provider prior to admission and medically cleared for therapy. In the case of an adverse event or change in clinical status requiring additional medical management, the PI will be contacted and the decision to admit the patient to the 5C unit will be determined by the BMT team B advanced practice provider and collaborating physician. The attending physician on service is Hoang Ruelas, [phone].

## 2021-08-25 NOTE — PATIENT INSTRUCTIONS
Return tomorrow as scheduled for provider visit and labs, please add infusion visit for possible IV fluid

## 2021-08-25 NOTE — PROCEDURES
BMT/Cellular Allogeneic Product Infusion       Patient Vitals for the past 24 hrs:   Temp Temp src Pulse Resp BP   08/25/21 0940 98  F (36.7  C) Oral 68 16 (!) 115/95   08/25/21 1103 97.5  F (36.4  C) Oral 64 16 122/70         BMT INFUSION DOCUMENTATION (last 48 hours)      BMT/Cellular Product Infusion     Row Name 08/25/21 0900                Product 08/25/21 0905 Other (specify in comment)    Product Details Product Release Date: 08/25/21  -NB Product Release Time: 0905  -NB Product Type: Other (specify in comment)  -NB DIN: [id number]  -NB Product Description Code: [id number]  -NB Volume Dispensed (mL): 28 mL  -NB    Checked by (Patient RN)  Irma BLAIR  -BERNIE       Checked by (Witness)  --       Product Volume Infused (mL)  28 mL  -SW       Flush Volume (mL)  50 mL  -SW       Volume Dispensed (mL)  --          Product 08/25/21 0905 Other (specify in comment)    Product Details Product Release Date: 08/25/21  -NB Product Release Time: 0905  -NB Product Type: Other (specify in comment)  -NB DIN: [id number]  -NB Product Description Code: [id number]  -NB Volume Dispensed (mL): 28 mL  -NB    Checked by (Patient RN)  Irma BLAIR  -BERNIE       Checked by (Witness)  --       Product Volume Infused (mL)  --       Flush Volume (mL)  50 mL  -SW       Volume Dispensed (mL)  --          Product 08/25/21 0905 Other (specify in comment)    Product Details Product Release Date: 08/25/21  -NB Product Release Time: 0905  -NB Product Type: Other (specify in comment)  -NB DIN: [id number]  -NB Product Description Code: [id number]  -NB Volume Dispensed (mL): 28 mL  -NB    Checked by (Patient RN)  Irma BLAIR  -BERNIE       Checked by (Witness)  --       Product Volume Infused (mL)  --       Flush Volume (mL)  50 mL  -SW       Volume Dispensed (mL)  --         User Key  (r) = Recorded By, (t) = Taken By, (c) = Cosigned By    Initials Name Effective Dates    Dimple Nice 07/11/21 -     Irma Covington RN 11/16/16 -         Allogeneic  Donor Eligibility Determination and Summary of Records: Eligible        Type of Infusion: Allogeneic      Baseline Pre-Infusion Evaluation (to be completed by Provider):   Dyspnea: Grade 0 - none  Hypoxia: Grade 0 - not present  Fever: Grade 0 - afebrile  Chills: Grade 0 - none  Febrile Neutropenia: Grade 0 - not present  Sinus Bradycardia: Grade 0 - none  Hypertension: Grade 2 - stage 1 hypertension (systolic -159 mm Hg or diastolic BP 90-99 mm Hg); medical intervention indicated; recurrent or persistent (>/ 24 hours); symptomatic increase by >/ 20 mm Hg (diastolic) or to > 140/90 mm Hg if previously WNL; monotherapy indicated  Hypotension: Grade 0 - none  Chest Pain: Grade 0 - none  Bronchospasm: Grade 0 - none  Pain: Grade 0 - none  Rash: Grade 0 - None  Neurologic Specify: none    If adverse reactions, events or complications occur (fever greater than 2 degrees fahrenheit increase, and severe reactions of the following types: chills, dyspnea, bronchospasm, hyper/hypotension, hypoxia, bradycardia, chest pain, back/flank pain, hypoxia, and any other reaction deemed severe or life threatening; any instance of product bag breakage or unusual product appearance)    Any other events that are >= grade 3, then immediately contact the BMT Attending physician, the Cell Therapy Laboratory Medical Director (pager [phone]) and the Cell Therapy Laboratory ([phone]).  After midnight, holidays & weekends contact the LT[facility], located within [facility] on the appropriate campus ([facility], located within [facility] [place]: [phone]; LTAC, located within [facility] [place]: [phone]).    Penny Garcia PA-C

## 2021-08-25 NOTE — PROGRESS NOTES
BMT Clinic Note      Juvenal Blake is a 55 year old male PMH significant for refractory NHL, undergoing  NK infusion. Admitted with rituxan (biosimilar) infusion reaction 6/24 and remained admitted through completion of lymphodepleting chemo and  NK cell infusion.  Currently day +58 after initiation .  Post LD chemo and Day +1 cycle 2 .     HPI: Here for visit and then goes to  for his  cells. Having diarrhea now, 2-3 stools.  No abdominal cramping with stools.  Having some nausea with decreased ability to drink fluid and eat.  Hemorrhoids are the same- using wipes and cream. Occasional cough, stable. Sob with exertion, same.  No fevers. No tooth pain.  Still pain in mid abdomen when goes over bump, unchanged  Will give him 1 liter of NS on floor.  Gave him some compazine in clinic for the nausea.  Still taking the allopurinol. No transfusions.    ROS:  10 point ROS negative except as above.      Physical Exam:     Blood pressure 123/75, pulse 76, temperature 98  F (36.7  C), temperature source Oral, resp. rate 16, weight 105.3 kg (232 lb 3.2 oz), SpO2 98 %.    Wt Readings from Last 4 Encounters:   08/25/21 105.3 kg (232 lb 3.2 oz)   08/24/21 113.9 kg (251 lb)   08/22/21 107.6 kg (237 lb 3.2 oz)   08/21/21 107.3 kg (236 lb 9.6 oz)       General: NAD, alert oriented  Head/ears:  head is normocephalic and atraumatic without tenderness, ears: The tympanic membrane is normal in appearance with normal landmarks and cone of light.  Eyes: sclera anicteric, noninjected  Mouth: OP moist without lesions. R front tooth/central incisor chipped down to base per pt but has flipper in place now. No gum erythema/edema  Lungs: CTAB, coughed away some course sound on right  Cardiovascular: RRR, no m/r/g  Abdominal/Rectal: normoactive BS, protuberant, no palpable organomegalies or masses.  Lymphatics: trace - 1-+ LE edema bilaterally, .    Skin: no rash; some bruising  Musculoskeletal and joints: No joint  swelling, 5/5 strength, equal, moving all joints and limbs  LN: no palpable LN in neck, supraclavicular, axillary and left groin, maybe something in right groin, nothing discrete  Neuro: non-focal, nose to finger intact, gait is normal , CN II-XII intact  Access: R arm PICC NT - no sign of infection arm is not swollen  Port-A-Cath on the right upper chest not accessed.      ICANS: 10/10 (8/25/21)- pt sentence log is in research folder in the [facility].      ECOG 1   8/25/2021    Labs:  Lab Results   Component Value Date    WBC 2.6 (L) 08/22/2021    ANEU 2.3 08/22/2021    HGB 11.0 (L) 08/22/2021    HCT 32.0 (L) 08/22/2021    PLT 80 (L) 08/22/2021     08/22/2021    POTASSIUM 4.0 08/22/2021    CHLORIDE 110 (H) 08/22/2021    CO2 27 08/22/2021     (H) 08/22/2021    BUN 10 08/22/2021    CR 0.96 08/22/2021    MAG 2.2 08/20/2021    INR 1.07 06/28/2021    BILITOTAL 0.4 08/21/2021    AST 27 08/21/2021    ALT 70 08/21/2021    ALKPHOS 86 08/21/2021    PROTTOTAL 5.8 (L) 08/21/2021    ALBUMIN 3.8 08/21/2021       I have assessed all abnormal lab values for their clinical significance and any values considered clinically significant have been addressed in the assessment and plan.         Assessment and Plan:   Juvenal Blake is a 55 year old male PMH significant for refractory NHL, undergoing  NK infusion. Admitted with rituxan (biosimilar) infusion reaction 6/24 and remained admitted through completion of lymphodepleting chemo and  NK cell infusion.    1.Follicular Lymphoma:   -had a good response to initial therapy.    -post reaction to the bio similar rituximab and that will not be used again.    -Dr. Zavala discussed the pros and cons of proceeding with a second cycle of .  Per protocol he should not have any active infections :he has  tooth that is broken which could be a potential site for infection.  However it will be 2 months before he is able to fix his tooth and delaying therapy that long is  not in his best interest.  He meets criteria to proceed with therapy and he is agreeable with the plan.    -8/22 UA=3.9, resumed allopurinol   -completed LD chemo   -8/25-  NK infusion today    2.HEME: Counts stable   Keep hgb >7g/dL, plt>10,000- no transfusions today       3.CV: Cardiology cleared him to proceed with no further testing.  The CT angiogram showed no lesion requiring stenting or bypass.    4. : The patient had no  complaints today the patient has some nocturia but otherwise no other active issues.  -Post radical prostatectomy and bilateral lymph node dissection. November 15, 2017. Prostatic adenocarcinoma [place] 4+3 = 7 with tertiary pattern 5. Tumor involves 45% of total surface area. Positive for extensive extraprostatic extension. Positive for bilateral seminal vesicle invasion. Multiple margins positive. Lymphovascular invasion not identified. Perineural invasion was noted. Lymph nodes negative. 3 were examined (2 right obturator and 1 left obturator). Completed radiation to the prostate fossa and pelvic lymph nodes at [facility] early May 2018. He received 4500 cGy in 25 fractions. He then had 2340 cGy boost in 13 fractions to the prostatic fossa, for a total dose of 6240 cGy in 38 treatment fractions delivered over 54 days. He did not receive hormonal therapy:       5. ID:  Afebrile.   monitor his cough and if worsens may need reassessment with imaging. - prophy: ACV,   - hx C diff colitis: finished vancomycin 6/23, treated for 7 days. Ppx bid vanco while neutropenic, now off.  See GI below.  -Bactrim: he has not been taking bactrim- he says he was told to stop sometime-inquired if this is for current protocol or just 1 year post NAM NK therapy? Research RN said there is no guidance and pharmacist said they think this is 6 months post NAM NK, reviewed 2019-25 FATE and says prophy per institutional standard    6.FEN/Renal: - Creat wnl- not drinking as much so will give 1 liter  Detail Level: Zone of NS today on 5C,  -hypophosphatemia, called 5C and they will replace     7. GI:   -nausea after LD chemo, prn compazine is helping.  -Diarrhea: now with diarrhea.  Repeat c diff, enteric, and adeno today.  Called an spoke with charge nurse today, they will collect on floor or send home with correct contains  -Abdominal Pain secondary to cancer, mid abdomen when goes over bump: oxycodone prn.    -Transaminitis: suspect related to cancer. Limit Tylenol, etoh and monitor. Mild ALT elevation of 83 (8/20), now back in normal range.    8. Dental: He was seen by a dentist as described in previous notes.  The identified no infection but no repair is possible for at least the next 2 months because of scheduling.  There is a possible increased risk of infection at that site and therefore he will need prophylactic antibiotics when neutropenic.     Final Plan:  Clinic follow-up then  to  Phase 1 unit for 2nd cycle infusion  Wed 8/25, today.   Collect stool on floor if able or send home with correct containers/hat to collect stool, for c diff, enteric and adeno  Give 1 liter of NS on floor and give compazine in clinic for nausea before he leaves    30 minutes spent on the date of the encounter doing chart review, review of test results, interpretation of tests, patient visit, documentation and discussion with other provider(s)       Glory Covarrubias PA-C on 8/21/2021 at 8:14 AM            Additional Notes: Site red, irritated. Dr. Humphreys states that the area appears to be a devolving suture irritation.

## 2021-08-25 NOTE — NURSING NOTE
"Oncology Rooming Note    August 25, 2021 7:56 AM   Juvenal Blake is a 55 year old male who presents for:    Chief Complaint   Patient presents with     Blood Draw     Labs drawn via picc by rn in lab. VS taken.     Oncology Clinic Visit     Follicular lymphoma grade i, lymph nodes of multiple sites (H)     Initial Vitals: /75 (BP Location: Left arm, Patient Position: Sitting, Cuff Size: Adult Large)   Pulse 76   Temp 98  F (36.7  C) (Oral)   Resp 16   Wt 105.3 kg (232 lb 3.2 oz)   SpO2 98%   BMI 35.31 kg/m   Estimated body mass index is 35.31 kg/m  as calculated from the following:    Height as of 8/24/21: 1.727 m (5' 8\").    Weight as of this encounter: 105.3 kg (232 lb 3.2 oz). Body surface area is 2.25 meters squared.  Mild Pain (2) Comment: Data Unavailable   No LMP for male patient.  Allergies reviewed: Yes  Medications reviewed: Yes    Medications: Medication refills not needed today.  Pharmacy name entered into [employer]: [id number] - [place], [place] - [address] AT [facility]    Clinical concerns: No new concerns.       Bárbara Krueger LPN August 25, 2021 7:56 AM                "

## 2021-08-25 NOTE — PROCEDURES
BMT Post Infusion Documentation    Data   Patient Vitals for the past 72 hrs:   Temp Temp src Pulse Resp BP   08/25/21 0940 98  F (36.7  C) Oral 68 16 (!) 115/95   08/25/21 1103 97.5  F (36.4  C) Oral 64 16 122/70   08/25/21 1138 97.5  F (36.4  C) Oral 61 20 --   08/25/21 1152 97.5  F (36.4  C) Oral 65 23 120/72   08/25/21 1206 97.9  F (36.6  C) Oral 65 23 131/75   08/25/21 1212 98.3  F (36.8  C) Oral 70 21 125/75   08/25/21 1225 97.8  F (36.6  C) Oral 66 23 129/78   08/25/21 1238 97.7  F (36.5  C) Oral 69 23 122/72   08/25/21 1315 97.1  F (36.2  C) (P) Axillary 74 22 129/73   08/25/21 1317 (!) (P) 96.7  F (35.9  C) (P) Axillary 74 (P) 22 125/76        BMT INFUSION DOCUMENTATION (last 24 hours)      BMT/Cellular Product Infusion     Row Name 08/25/21 0900                Cell Therapy Documentation    Product Release Date  08/25/21  -NB       Recipient Study ID    -NB       Donor  Allogeneic - Unrelated  -NB       Donor MRN/ID  [id number]  -NB       Donor ABO/Rh  -- N/A  -NB       Allogeneic Donor Eligibility Determination and Summary of Records  Eligible  -NB       Type of Infusion  Allogeneic  -NB       Total Volume Dispensed (mL)  84  -NB       Total NC Dose  N/A  -NB       Total CD34 Dose  N/A  -NB       Total CD3 dose  N/A  -NB       Total NC Dose Left in Storage  NONE  -NB       Comments for Product Issues   Investigational product; 9.00E+08 Total cells; Lot # [id number]  -NB       Donor ABO/Rh  --       Product Types  --       Product Numbers  --       Product Types and Numbers  --       Volume  --       ABO Mismatch  --       ZZTotal NC Dose  --       ZZTotal CD34 Dose  --       ZZTotal NC Dose Left in Storage  --          [REMOVED] Product 08/25/21 0905 Other (specify in comment)    Product Details Product Release Date: 08/25/21  -NB Product Release Time: 0905 -NB Product Type: Other (specify in comment)  -NB DIN: [id number]  -NB Product Description Code: [id number]  -NB Volume Dispensed  (mL): 28 mL  -NB Completion Date (RN to complete): 08/25/21  -SW Completion Time (RN to complete): 1211  -SW    Checked by (Patient RN)  Irma BLAIR  -SW       Checked by (Witness)  --       Product Volume Infused (mL)  28 mL  -SW       Flush Volume (mL)  50 mL  -SW       Volume Dispensed (mL)  --          Product 08/25/21 0905 Other (specify in comment)    Product Details Product Release Date: 08/25/21  -NB Product Release Time: 0905  -NB Product Type: Other (specify in comment)  -NB DIN: [id number]  -NB Product Description Code: [id number]  -NB Volume Dispensed (mL): 28 mL  -NB    Checked by (Patient RN)  Irma BLAIR  -SW       Checked by (Witness)  --       Product Volume Infused (mL)  28 mL  -SW       Flush Volume (mL)  50 mL  -SW       Volume Dispensed (mL)  --          Product 08/25/21 0905 Other (specify in comment)    Product Details Product Release Date: 08/25/21  -NB Product Release Time: 0905  -NB Product Type: Other (specify in comment)  -NB DIN: [id number]  -NB Product Description Code: [id number]  -NB Volume Dispensed (mL): 28 mL  -NB    Checked by (Patient RN)  Irma BLAIR  -SW       Checked by (Witness)  --       Product Volume Infused (mL)  28 mL  -SW       Flush Volume (mL)  50 mL  -SW       Volume Dispensed (mL)  --          RN Documentation    Patient was premedicated as ordered  yes  -SW       Line Type  central line, right  -SW       Patient Stable Prior to Infusion  yes  -SW       Time Infusion Started  1122  -SW       Checked by (Patient RN)  --       Checked by (RN 2)  --       Broken Bag?  --       Immediate suspected transfusion reaction to the product  --       Time Infusion Stopped  --       Total Flush Volume (mL)  --       Checked by (Witness)  --       Date Infusion Started  --       Date Infusion Stopped  --       Volume Infused (mL)  --       Total Volume Infused (cc)  --          Patient tolerance of product infusion    Immediate suspected transfusion reaction to the product  none   -SW       Did patient have prior history of similar signs/symptoms during this hospitalization?  NA  -SW       Symptoms during/after infusion  other (comment) None  -SW       Did the patient tolerate the infusion well  yes  -SW       Medications and treatment for symptoms  -- NA  -SW       Did the symptoms resolve?  -- NA  -SW       Enter comments if clots, leaks, broken bag, infusion delays, other issues with bag/infusion  NA  -SW       Describe symptoms  --         User Key  (r) = Recorded By, (t) = Taken By, (c) = Cosigned By    Initials Name Effective Dates    Dimple Nice 07/11/21 -     SW Irma Eldridge RN 11/16/16 -             Post-Infusion Evaluation:   Infusion Related Reaction: Grade 0 - none  Dyspnea: Grade 0 - none  Hypoxia: Grade 0 - not present  Fever: Grade 0 - afebrile  Chills: Grade 0 - none  Febrile Neutropenia: Grade 0 - not present  Sinus Bradycardia: Grade 0 - none  Hypertension: Grade 0 - none  Hypotension: Grade 0 - none  Chest Pain: Grade 0 - none  Bronchospasm: Grade 0 - none  Pain: Grade 0 - none  Rash: Grade 0 - None  Neurologic Specify: none    If this was a cord blood transplant, was more than one cord blood unit infused? no    Penny Garcia PA-C

## 2021-08-26 ENCOUNTER — MYC MEDICAL ADVICE (OUTPATIENT)
Dept: TRANSPLANT | Facility: CLINIC | Age: 56
End: 2021-08-26

## 2021-08-26 ENCOUNTER — APPOINTMENT (OUTPATIENT)
Dept: LAB | Facility: CLINIC | Age: 56
End: 2021-08-26
Attending: PHYSICIAN ASSISTANT
Payer: COMMERCIAL

## 2021-08-26 ENCOUNTER — ONCOLOGY VISIT (OUTPATIENT)
Dept: TRANSPLANT | Facility: CLINIC | Age: 56
End: 2021-08-26
Attending: PHYSICIAN ASSISTANT
Payer: COMMERCIAL

## 2021-08-26 VITALS
HEIGHT: 68 IN | SYSTOLIC BLOOD PRESSURE: 123 MMHG | WEIGHT: 231 LBS | BODY MASS INDEX: 35.01 KG/M2 | TEMPERATURE: 98.6 F | RESPIRATION RATE: 18 BRPM | DIASTOLIC BLOOD PRESSURE: 72 MMHG | OXYGEN SATURATION: 100 % | HEART RATE: 80 BPM

## 2021-08-26 DIAGNOSIS — C82.08 FOLLICULAR LYMPHOMA GRADE I, LYMPH NODES OF MULTIPLE SITES (H): Primary | ICD-10-CM

## 2021-08-26 DIAGNOSIS — C82.00 FOLLICULAR LYMPHOMA GRADE I, UNSPECIFIED BODY REGION (H): ICD-10-CM

## 2021-08-26 PROCEDURE — 99214 OFFICE O/P EST MOD 30 MIN: CPT

## 2021-08-26 PROCEDURE — G0463 HOSPITAL OUTPT CLINIC VISIT: HCPCS

## 2021-08-26 PROCEDURE — 250N000011 HC RX IP 250 OP 636: Performed by: PHYSICIAN ASSISTANT

## 2021-08-26 RX ORDER — CEFDINIR 300 MG/1
300 CAPSULE ORAL 2 TIMES DAILY
Qty: 60 CAPSULE | Refills: 1 | Status: SHIPPED | OUTPATIENT
Start: 2021-08-26 | End: 2021-09-15

## 2021-08-26 RX ORDER — FLUCONAZOLE 200 MG/1
200 TABLET ORAL DAILY
Qty: 30 TABLET | Refills: 1 | Status: SHIPPED | OUTPATIENT
Start: 2021-08-26 | End: 2021-09-15

## 2021-08-26 RX ORDER — ACYCLOVIR 800 MG/1
800 TABLET ORAL EVERY 12 HOURS
COMMUNITY
Start: 2021-08-26 | End: 2021-09-15

## 2021-08-26 RX ORDER — FLUCONAZOLE 200 MG/1
200 TABLET ORAL DAILY
COMMUNITY
Start: 2021-08-26 | End: 2021-08-26

## 2021-08-26 RX ORDER — HEPARIN SODIUM,PORCINE 10 UNIT/ML
5 VIAL (ML) INTRAVENOUS
Status: DISCONTINUED | OUTPATIENT
Start: 2021-08-26 | End: 2021-08-26 | Stop reason: HOSPADM

## 2021-08-26 RX ORDER — HEPARIN SODIUM,PORCINE 10 UNIT/ML
5 VIAL (ML) INTRAVENOUS
Status: CANCELLED | OUTPATIENT
Start: 2021-08-26

## 2021-08-26 RX ORDER — HEPARIN SODIUM (PORCINE) LOCK FLUSH IV SOLN 100 UNIT/ML 100 UNIT/ML
5 SOLUTION INTRAVENOUS
Status: CANCELLED | OUTPATIENT
Start: 2021-08-26

## 2021-08-26 RX ADMIN — Medication 5 ML: at 10:07

## 2021-08-26 RX ADMIN — Medication 5 ML: at 10:06

## 2021-08-26 ASSESSMENT — PAIN SCALES - GENERAL: PAINLEVEL: NO PAIN (0)

## 2021-08-26 ASSESSMENT — MIFFLIN-ST. JEOR: SCORE: 1847.18

## 2021-08-26 NOTE — PROGRESS NOTES
BMT Clinic Note      Juvenal Blake is a 55 year old male PMH significant for refractory NHL, undergoing  NK infusion. Admitted with rituxan (biosimilar) infusion reaction 6/24 and remained admitted through completion of lymphodepleting chemo and  NK cell infusion.  Currently day +60 after initiation .  Post LD chemo and Day +3 cycle 2 .     HPI: Diarrhea resolved. Mild persistent nausea without vomiting. No fevers, stable dry intermittent cough, not worse. No SOB. No rashes, bruises or bleeding.    ROS: 10 point ROS negative except as above.      Physical Exam:     Blood pressure 121/77, pulse 73, temperature 97.7  F (36.5  C), temperature source Tympanic, resp. rate 16, weight 104.5 kg (230 lb 4.8 oz), SpO2 97 %.    Wt Readings from Last 4 Encounters:   08/27/21 104.5 kg (230 lb 4.8 oz)   08/26/21 104.8 kg (231 lb)   08/25/21 105.3 kg (232 lb 3.2 oz)   08/24/21 113.9 kg (251 lb)       General: NAD, alert oriented  Head/ears:  head is normocephalic and atraumatic without tenderness  Eyes: sclera anicteric, noninjected  Mouth: OP moist without lesions. R front tooth/central incisor chipped down to base per pt, flipper in place  Lungs: CTAB, no cough or coarse sounds today   Cardiovascular: RRR, no m/r/g  Abdominal/Rectal: normoactive BS, protuberant, no palpable organomegalies or masses.  Lymphatics: trace - 1-+ LE edema bilaterally   Skin: no rash; some bruising  Neuro: non-focal, nose to finger intact, gait is normal , CN II-XII intact  Access: R arm PICC NT - no sign of infection arm is not swollen  Port-A-Cath on the right upper chest not accessed      ICANS: 10/10 (8/27/21)- pt sentence log is in research folder in the [facility].      ECOG 1   8/27/2021    Labs:  Lab Results   Component Value Date    WBC 0.9 (LL) 08/26/2021    ANEU 0.7 (L) 08/26/2021    HGB 10.7 (L) 08/26/2021    HCT 30.2 (L) 08/26/2021    PLT 56 (L) 08/26/2021     08/26/2021    POTASSIUM 3.9 08/26/2021    CHLORIDE 106  08/26/2021    CO2 28 08/26/2021     (H) 08/26/2021    BUN 11 08/26/2021    CR 0.87 08/26/2021    MAG 1.9 08/26/2021    INR 1.07 06/28/2021    BILITOTAL 1.1 08/26/2021    AST 57 (H) 08/26/2021     (H) 08/26/2021    ALKPHOS 99 08/26/2021    PROTTOTAL 6.2 (L) 08/26/2021    ALBUMIN 3.9 08/26/2021       I have assessed all abnormal lab values for their clinical significance and any values considered clinically significant have been addressed in the assessment and plan.         Assessment and Plan:   Juvenal Blake is a 55 year old male PMH significant for refractory NHL, undergoing  NK infusion. Admitted with rituxan (biosimilar) infusion reaction 6/24 and remained admitted through completion of lymphodepleting chemo and  NK cell infusion.      1. Follicular Lymphoma:   Good response to initial therapy.    Post reaction to the bio similar rituximab and that will not be used again.    Dr. Zavala discussed the pros and cons of proceeding with a second cycle of .  Per protocol he should not have any active infections :he has  tooth that is broken which could be a potential site for infection.  However it will be 2 months before he is able to fix his tooth and delaying therapy that long is not in his best interest.  He meets criteria to proceed with therapy and he is agreeable with the plan.      8/22 UA=3.9, resumed allopurinol ( make sure taking full tablet)  Completed LD chemo, 8/25-  NK infusion     2. HEME: WBC trending down, WBC=0.7 and ANC=0.3. Will restart prophy antibiotics as below  Keep hgb >7g/dL, plt>10,000. plt improving today. WBC still low    3. CV: Cardiology cleared him to proceed with no further testing. The CT angiogram showed no lesion requiring stenting or bypass.    4. : The patient had no  complaints today   Post radical prostatectomy and bilateral lymph node dissection. November 15, 2017. Prostatic adenocarcinoma Maricel 4+3 = 7 with tertiary pattern 5.  Tumor involves 45% of total surface area. Positive for extensive extraprostatic extension. Positive for bilateral seminal vesicle invasion. Multiple margins positive. Lymphovascular invasion not identified. Perineural invasion was noted. Lymph nodes negative. 3 were examined (2 right obturator and 1 left obturator). Completed radiation to the prostate fossa and pelvic lymph nodes at [facility] early May 2018. He received 4500 cGy in 25 fractions. He then had 2340 cGy boost in 13 fractions to the prostatic fossa, for a total dose of 6240 cGy in 38 treatment fractions delivered over 54 days. He did not receive hormonal therapy:       5. ID:  Afebrile but now with lower counts. 8/26 Restarted fluconazole 200 mg po daily, increase acyclovir to 800mg bid and cefdinir and avoid levaquin with history of c diff  - hx C diff colitis: finished vancomycin 6/23, treated for 7 days. Ppx bid vanco while neutropenic, now off.  See GI below.  Bactrim: he has not been taking bactrim- he says he was told to stop sometime-inquired if this is for current protocol or just 1 year post NAM NK therapy? Research RN said there is no guidance and pharmacist said they think this is 6 months post NAM NK, reviewed 2019-25 FATE and says prophy per institutional standard    6.FEN/Renal: Cr/lytes  Receiving supplement IV phos as needed     7. GI:   Nausea after LD chemo, prn compazine is helping.  Diarrhea:  8/25 c diff=neg and enteric=neg, adeno pending.  Imodium ok, diarrhea resolved 8/27    Abdominal Pain secondary to cancer, mid abdomen when goes over bump: oxycodone prn. ransaminitis: suspected related to cancer. Limit Tylenol, etoh and monitor. Mild ALT elevation of 83 (8/20), then back in normal range.  8/26  ALT and AST elevated again, likely due to chemo, will monitor.    8. Dental: He was seen by a dentist as described in previous notes.  The identified no infection but no repair is possible for at least the next 2 months because  of scheduling.  There is a possible increased risk of infection at that site and therefore he will need prophylactic antibiotics when neutropenic.   Denies tooth pain today    Final Plan/Dispo:  No medication changes today. No transfusions.  RTC Monday, Wednesday per study requirements    30 minutes spent on the date of the encounter doing chart review, review of test results, interpretation of tests, patient visit, documentation and discussion with other provider(s)       THOMAS Mercedes-C   [phone]

## 2021-08-26 NOTE — NURSING NOTE
"Oncology Rooming Note    August 26, 2021 10:18 AM   Juvenal Blake is a 55 year old male who presents for:    Chief Complaint   Patient presents with     Labs Only     picc, heparin locked, vitals checked     Oncology Clinic Visit     [facility] RETURN - FOLLICULAR LYMPHOMA     Initial Vitals: /72   Pulse 80   Temp 98.6  F (37  C)   Resp 18   Ht 1.727 m (5' 7.99\")   Wt 104.8 kg (231 lb)   SpO2 100%   BMI 35.13 kg/m   Estimated body mass index is 35.13 kg/m  as calculated from the following:    Height as of this encounter: 1.727 m (5' 7.99\").    Weight as of this encounter: 104.8 kg (231 lb). Body surface area is 2.24 meters squared.  No Pain (0) Comment: Data Unavailable   No LMP for male patient.  Allergies reviewed: Yes  Medications reviewed: Yes    Medications: Medication refills not needed today.  Pharmacy name entered into [employer]: [id number] - [place], [place] - [address] AT [facility]    Clinical concerns: No new concerns. Provider was notified.      Mustapha Kemp LPN            "

## 2021-08-26 NOTE — PLAN OF CARE
BP (P) 125/76 (Cuff Size: Adult Regular)   Pulse 74   Temp (!) (P) 96.7  F (35.9  C) (Axillary)   Resp (P) 22   SpO2 99%      Type of transplant: Donor: Allogeneic - Unrelated  Product:      BMT INFUSION DOCUMENTATION (last 48 hours)        BMT/Cellular Product Infusion       Row Name 08/25/21 0900                [REMOVED] Product 08/25/21 0905 Other (specify in comment)    Product Details Product Release Date: 08/25/21  -NB Product Release Time: 0905  -NB Product Type: Other (specify in comment)  -NB DIN: [id number]  -NB Product Description Code: [id number]  -NB Volume Dispensed (mL): 28 mL  -NB Completion Date (RN to complete): 08/25/21  -SW Completion Time (RN to complete): 1211  -SW    Checked by (Patient RN)  Irma QUINTEROS       Checked by (Witness)  --       Product Volume Infused (mL)  28 mL  -SW       Flush Volume (mL)  50 mL  -SW       Volume Dispensed (mL)  --          [REMOVED] Product 08/25/21 0905 Other (specify in comment)    Product Details Product Release Date: 08/25/21  -NB Product Release Time: 0905  -NB Product Type: Other (specify in comment)  -NB DIN: [id number]  -NB Product Description Code: [id number]  -NB Volume Dispensed (mL): 28 mL  -NB Completion Date (RN to complete): 08/25/21  -NI Completion Time (RN to complete): 1636  -NI    Checked by (Patient RN)  Irma QUINTEROS       Checked by (Witness)  Emilia Castillo RN  -CS       Product Volume Infused (mL)  28 mL  -SW       Flush Volume (mL)  50 mL  -SW       Volume Dispensed (mL)  --          [REMOVED] Product 08/25/21 0905 Other (specify in comment)    Product Details Product Release Date: 08/25/21  -NB Product Release Time: 0905  -NB Product Type: Other (specify in comment)  -NB DIN: [id number]  -NB Product Description Code: [id number]  -NB Volume Dispensed (mL): 28 mL  -NB Completion Date (RN to complete): 08/25/21  -NI Completion Time (RN to complete): 1636  -NI    Checked by (Patient RN)  Irma QUINTEROS       Checked by  (Witness)  Emilia Castillo RN  -MARK       Product Volume Infused (mL)  28 mL  -SW       Flush Volume (mL)  50 mL  -SW       Volume Dispensed (mL)  --             User Key  (r) = Recorded By, (t) = Taken By, (c) = Cosigned By      Initials Name Effective Dates    NI Inpatient, Nurse --    Emilia Matthews RN 02/20/19 -     Dimple Nice 07/11/21 -     Irma Covington RN 11/16/16 -           Preparation: RN Documentation  Patient was premedicated as ordered: yes  Line Type: central line, right  Patient Stable Prior to Infusion: yes  Time Infusion Started: 1122  Teaching: side effects/monitoring  Tolerated/Reaction: Patient tolerance of product infusion  Immediate suspected transfusion reaction to the product: none  Did patient have prior history of similar signs/symptoms during this hospitalization?: NA  Symptoms during/after infusion: other (comment) (None)  Did the patient tolerate the infusion well: yes  Medications and treatment for symptoms:  (NA)  Did the symptoms resolve?:  (NA)  Enter comments if clots, leaks, broken bag, infusion delays, other issues with bag/infusion: NA  Flush until: NA  Plan: Continue to monitor patient for any adverse reactions and discharge after IL2 is given.

## 2021-08-26 NOTE — PROGRESS NOTES
"BMT Clinic Note      Juvenal Blake is a 55 year old male PMH significant for refractory NHL, undergoing  NK infusion. Admitted with rituxan (biosimilar) infusion reaction 6/24 and remained admitted through completion of lymphodepleting chemo and  NK cell infusion.  Currently day +58 after initiation .  Post LD chemo and Day +2 cycle 2 .     HPI: Here for visit with his wife. Had his  cell infusion yesterday. Had several days of diarrhea that has slowed down today that is c diff negative.  No abdominal cramping with stools.  Having some nausea without emesis.  He is taking compazine.  Hemorrhoids are the same- using wipes and cream. Occasional cough, stable, thinks that this is from clear post nasal drip. Sob with exertion, same.  No fevers. No tooth pain.  Still pain in mid abdomen when goes over bump, unchanged  Declines need for fluid today both he and his wife say he is able to drink Gave him some compazine in clinic for the nausea.  Still taking the allopurinol. No transfusions.    ROS:  10 point ROS negative except as above.      Physical Exam:     Blood pressure 123/72, pulse 80, temperature 98.6  F (37  C), resp. rate 18, height 1.727 m (5' 7.99\"), weight 104.8 kg (231 lb), SpO2 100 %.    Wt Readings from Last 4 Encounters:   08/26/21 104.8 kg (231 lb)   08/25/21 105.3 kg (232 lb 3.2 oz)   08/24/21 113.9 kg (251 lb)   08/22/21 107.6 kg (237 lb 3.2 oz)       General: NAD, alert oriented  Head/ears:  head is normocephalic and atraumatic without tenderness, ears: The tympanic membrane is normal in appearance with normal landmarks and cone of light.  Eyes: sclera anicteric, noninjected  Mouth: OP moist without lesions. R front tooth/central incisor chipped down to base per pt but has flipper in place now. No gum erythema/edema  Lungs: CTAB, coughed away some course sound on right  Cardiovascular: RRR, no m/r/g  Abdominal/Rectal: normoactive BS, protuberant, no palpable organomegalies " or masses.  Lymphatics: trace - 1-+ LE edema bilaterally, .    Skin: no rash; some bruising  Musculoskeletal and joints: No joint swelling, 5/5 strength, equal, moving all joints and limbs  LN: no palpable LN in neck, supraclavicular, axillary and left groin, maybe something in right groin, nothing discrete  Neuro: non-focal, nose to finger intact, gait is normal , CN II-XII intact  Access: R arm PICC NT - no sign of infection arm is not swollen  Port-A-Cath on the right upper chest not accessed.      ICANS: 10/10 (8/26/21)- pt sentence log is in research folder in the [facility].      ECOG 1   8/26/2021    Labs:  Lab Results   Component Value Date    WBC 0.9 (LL) 08/26/2021    ANEU 0.7 (L) 08/26/2021    HGB 10.7 (L) 08/26/2021    HCT 30.2 (L) 08/26/2021    PLT 56 (L) 08/26/2021     08/26/2021    POTASSIUM 3.9 08/26/2021    CHLORIDE 106 08/26/2021    CO2 28 08/26/2021     (H) 08/26/2021    BUN 11 08/26/2021    CR 0.87 08/26/2021    MAG 1.9 08/26/2021    INR 1.07 06/28/2021    BILITOTAL 1.1 08/26/2021    AST 57 (H) 08/26/2021     (H) 08/26/2021    ALKPHOS 99 08/26/2021    PROTTOTAL 6.2 (L) 08/26/2021    ALBUMIN 3.9 08/26/2021       I have assessed all abnormal lab values for their clinical significance and any values considered clinically significant have been addressed in the assessment and plan.         Assessment and Plan:   Juvenal Blake is a 55 year old male PMH significant for refractory NHL, undergoing  NK infusion. Admitted with rituxan (biosimilar) infusion reaction 6/24 and remained admitted through completion of lymphodepleting chemo and  NK cell infusion.    1.Follicular Lymphoma:   -had a good response to initial therapy.    -post reaction to the bio similar rituximab and that will not be used again.    -Dr. Zavala discussed the pros and cons of proceeding with a second cycle of .  Per protocol he should not have any active infections :he has  tooth that is broken which  could be a potential site for infection.  However it will be 2 months before he is able to fix his tooth and delaying therapy that long is not in his best interest.  He meets criteria to proceed with therapy and he is agreeable with the plan.    -8/22 UA=3.9, resumed allopurinol ( make sure taking full tablet)  -completed LD chemo   -8/25-  NK infusion today    2.HEME: WBC trending down, WBC=0.9 and ANC=0.7. Will restart prophy antibiotics as below  Keep hgb >7g/dL, plt>10,000- no transfusions today but will schedule possible platelets tomorrow, hopefully will not need them       3.CV: Cardiology cleared him to proceed with no further testing.  The CT angiogram showed no lesion requiring stenting or bypass.    4. : The patient had no  complaints today   -Post radical prostatectomy and bilateral lymph node dissection. November 15, 2017. Prostatic adenocarcinoma Maricel 4+3 = 7 with tertiary pattern 5. Tumor involves 45% of total surface area. Positive for extensive extraprostatic extension. Positive for bilateral seminal vesicle invasion. Multiple margins positive. Lymphovascular invasion not identified. Perineural invasion was noted. Lymph nodes negative. 3 were examined (2 right obturator and 1 left obturator). Completed radiation to the prostate fossa and pelvic lymph nodes at [facility] early May 2018. He received 4500 cGy in 25 fractions. He then had 2340 cGy boost in 13 fractions to the prostatic fossa, for a total dose of 6240 cGy in 38 treatment fractions delivered over 54 days. He did not receive hormonal therapy:       5. ID:  Afebrile but now with lower counts.  Restart fluconazole 200 mg po daily, increase acyclovir to 800mg bid and cefdinir and avoid levaquin with history of c diff    - cough stable.  Likely from clear post nasal drip  - hx C diff colitis: finished vancomycin 6/23, treated for 7 days. Ppx bid vanco while neutropenic, now off.  See GI below.  -Bactrim: he has not been  taking bactrim- he says he was told to stop sometime-inquired if this is for current protocol or just 1 year post NAM NK therapy? Research RN said there is no guidance and pharmacist said they think this is 6 months post NAM NK, reviewed 2019-25 FATE and says prophy per institutional standard    6.FEN/Renal: - Creat wnl- not drinking as much so will give 1 liter of NS today on 5C,  -hypophosphatemia, called 5C and they will replace     7. GI:   -nausea after LD chemo, prn compazine is helping.  -Diarrhea:  8/25 c diff=neg and enteric=neg, adeno pending.  Imodium ok, diarrhea better today    -Abdominal Pain secondary to cancer, mid abdomen when goes over bump: oxycodone prn.    -Transaminitis: suspected related to cancer. Limit Tylenol, etoh and monitor. Mild ALT elevation of 83 (8/20), then back in normal range.  8/26  ALT and AST elevated again, likely due to chemo, will monitor.    8. Dental: He was seen by a dentist as described in previous notes.  The identified no infection but no repair is possible for at least the next 2 months because of scheduling.  There is a possible increased risk of infection at that site and therefore he will need prophylactic antibiotics when neutropenic.   Denies tooth pain today    Final Plan/Dispo:  Return tomorrow per treatment plan.  Preorder plts just in case.  Call with fevers 100.5 or more since neutropenic.    30 minutes spent on the date of the encounter doing chart review, review of test results, interpretation of tests, patient visit, documentation and discussion with other provider(s)       Glory Covarrubias PA-C on 8/26/2021 at 8:14 AM

## 2021-08-26 NOTE — PATIENT INSTRUCTIONS
Return on Friday, 8/27 for labs, provider visit and add possible infusion of IV fluid and or platelets

## 2021-08-26 NOTE — LETTER
"    8/26/2021         RE: Juvenal Blake  [address] [place] [address]        Dear Colleague,    Thank you for referring your patient, Juvenal Blake, to the [facility] BLOOD AND MARROW TRANSPLANT PROGRAM [place]. Please see a copy of my visit note below.    BMT Clinic Note      Juvenal Blake is a 55 year old male PMH significant for refractory NHL, undergoing  NK infusion. Admitted with rituxan (biosimilar) infusion reaction 6/24 and remained admitted through completion of lymphodepleting chemo and  NK cell infusion.  Currently day +58 after initiation .  Post LD chemo and Day +2 cycle 2 .     HPI: Here for visit with his wife. Had his  cell infusion yesterday. Had several days of diarrhea that has slowed down today that is c diff negative.  No abdominal cramping with stools.  Having some nausea without emesis.  He is taking compazine.  Hemorrhoids are the same- using wipes and cream. Occasional cough, stable, thinks that this is from clear post nasal drip. Sob with exertion, same.  No fevers. No tooth pain.  Still pain in mid abdomen when goes over bump, unchanged  Declines need for fluid today both he and his wife say he is able to drink Gave him some compazine in clinic for the nausea.  Still taking the allopurinol. No transfusions.    ROS:  10 point ROS negative except as above.      Physical Exam:     Blood pressure 123/72, pulse 80, temperature 98.6  F (37  C), resp. rate 18, height 1.727 m (5' 7.99\"), weight 104.8 kg (231 lb), SpO2 100 %.    Wt Readings from Last 4 Encounters:   08/26/21 104.8 kg (231 lb)   08/25/21 105.3 kg (232 lb 3.2 oz)   08/24/21 113.9 kg (251 lb)   08/22/21 107.6 kg (237 lb 3.2 oz)       General: NAD, alert oriented  Head/ears:  head is normocephalic and atraumatic without tenderness, ears: The tympanic membrane is normal in appearance with normal landmarks and cone of light.  Eyes: sclera anicteric, noninjected  Mouth: OP moist without " lesions. R front tooth/central incisor chipped down to base per pt but has flipper in place now. No gum erythema/edema  Lungs: CTAB, coughed away some course sound on right  Cardiovascular: RRR, no m/r/g  Abdominal/Rectal: normoactive BS, protuberant, no palpable organomegalies or masses.  Lymphatics: trace - 1-+ LE edema bilaterally, .    Skin: no rash; some bruising  Musculoskeletal and joints: No joint swelling, 5/5 strength, equal, moving all joints and limbs  LN: no palpable LN in neck, supraclavicular, axillary and left groin, maybe something in right groin, nothing discrete  Neuro: non-focal, nose to finger intact, gait is normal , CN II-XII intact  Access: R arm PICC NT - no sign of infection arm is not swollen  Port-A-Cath on the right upper chest not accessed.      ICANS: 10/10 (8/26/21)- pt sentence log is in research folder in the [facility].      ECOG 1   8/26/2021    Labs:  Lab Results   Component Value Date    WBC 0.9 (LL) 08/26/2021    ANEU 0.7 (L) 08/26/2021    HGB 10.7 (L) 08/26/2021    HCT 30.2 (L) 08/26/2021    PLT 56 (L) 08/26/2021     08/26/2021    POTASSIUM 3.9 08/26/2021    CHLORIDE 106 08/26/2021    CO2 28 08/26/2021     (H) 08/26/2021    BUN 11 08/26/2021    CR 0.87 08/26/2021    MAG 1.9 08/26/2021    INR 1.07 06/28/2021    BILITOTAL 1.1 08/26/2021    AST 57 (H) 08/26/2021     (H) 08/26/2021    ALKPHOS 99 08/26/2021    PROTTOTAL 6.2 (L) 08/26/2021    ALBUMIN 3.9 08/26/2021       I have assessed all abnormal lab values for their clinical significance and any values considered clinically significant have been addressed in the assessment and plan.         Assessment and Plan:   Juvenal Blake is a 55 year old male PMH significant for refractory NHL, undergoing  NK infusion. Admitted with rituxan (biosimilar) infusion reaction 6/24 and remained admitted through completion of lymphodepleting chemo and  NK cell infusion.    1.Follicular Lymphoma:   -had a good response to  initial therapy.    -post reaction to the bio similar rituximab and that will not be used again.    -Dr. Zavala discussed the pros and cons of proceeding with a second cycle of .  Per protocol he should not have any active infections :he has  tooth that is broken which could be a potential site for infection.  However it will be 2 months before he is able to fix his tooth and delaying therapy that long is not in his best interest.  He meets criteria to proceed with therapy and he is agreeable with the plan.    -8/22 UA=3.9, resumed allopurinol ( make sure taking full tablet)  -completed LD chemo   -8/25-  NK infusion today    2.HEME: WBC trending down, WBC=0.9 and ANC=0.7. Will restart prophy antibiotics as below  Keep hgb >7g/dL, plt>10,000- no transfusions today but will schedule possible platelets tomorrow, hopefully will not need them       3.CV: Cardiology cleared him to proceed with no further testing.  The CT angiogram showed no lesion requiring stenting or bypass.    4. : The patient had no  complaints today   -Post radical prostatectomy and bilateral lymph node dissection. November 15, 2017. Prostatic adenocarcinoma [place] 4+3 = 7 with tertiary pattern 5. Tumor involves 45% of total surface area. Positive for extensive extraprostatic extension. Positive for bilateral seminal vesicle invasion. Multiple margins positive. Lymphovascular invasion not identified. Perineural invasion was noted. Lymph nodes negative. 3 were examined (2 right obturator and 1 left obturator). Completed radiation to the prostate fossa and pelvic lymph nodes at [facility] early May 2018. He received 4500 cGy in 25 fractions. He then had 2340 cGy boost in 13 fractions to the prostatic fossa, for a total dose of 6240 cGy in 38 treatment fractions delivered over 54 days. He did not receive hormonal therapy:       5. ID:  Afebrile but now with lower counts.  Restart fluconazole 200 mg po daily, increase  acyclovir to 800mg bid and cefdinir and avoid levaquin with history of c diff    - cough stable.  Likely from clear post nasal drip  - hx C diff colitis: finished vancomycin 6/23, treated for 7 days. Ppx bid vanco while neutropenic, now off.  See GI below.  -Bactrim: he has not been taking bactrim- he says he was told to stop sometime-inquired if this is for current protocol or just 1 year post NAM NK therapy? Research RN said there is no guidance and pharmacist said they think this is 6 months post NAM NK, reviewed 2019-25 FATE and says prophy per institutional standard    6.FEN/Renal: - Creat wnl- not drinking as much so will give 1 liter of NS today on 5C,  -hypophosphatemia, called 5C and they will replace     7. GI:   -nausea after LD chemo, prn compazine is helping.  -Diarrhea:  8/25 c diff=neg and enteric=neg, adeno pending.  Imodium ok, diarrhea better today    -Abdominal Pain secondary to cancer, mid abdomen when goes over bump: oxycodone prn.    -Transaminitis: suspected related to cancer. Limit Tylenol, etoh and monitor. Mild ALT elevation of 83 (8/20), then back in normal range.  8/26  ALT and AST elevated again, likely due to chemo, will monitor.    8. Dental: He was seen by a dentist as described in previous notes.  The identified no infection but no repair is possible for at least the next 2 months because of scheduling.  There is a possible increased risk of infection at that site and therefore he will need prophylactic antibiotics when neutropenic.   Denies tooth pain today    Final Plan/Dispo:  Return tomorrow per treatment plan.  Preorder plts just in case.  Call with fevers 100.5 or more since neutropenic.    30 minutes spent on the date of the encounter doing chart review, review of test results, interpretation of tests, patient visit, documentation and discussion with other provider(s)       Glory Covarrubias PA-C on 8/26/2021 at 8:14 AM               Again, thank you for allowing me to  participate in the care of your patient.        Sincerely,        BMT Advanced Practice Provider

## 2021-08-27 ENCOUNTER — ONCOLOGY VISIT (OUTPATIENT)
Dept: TRANSPLANT | Facility: CLINIC | Age: 56
End: 2021-08-27
Attending: STUDENT IN AN ORGANIZED HEALTH CARE EDUCATION/TRAINING PROGRAM
Payer: COMMERCIAL

## 2021-08-27 ENCOUNTER — RESEARCH ENCOUNTER (OUTPATIENT)
Dept: TRANSPLANT | Facility: CLINIC | Age: 56
End: 2021-08-27

## 2021-08-27 ENCOUNTER — APPOINTMENT (OUTPATIENT)
Dept: LAB | Facility: CLINIC | Age: 56
End: 2021-08-27
Attending: PHYSICIAN ASSISTANT
Payer: COMMERCIAL

## 2021-08-27 VITALS
BODY MASS INDEX: 35.03 KG/M2 | SYSTOLIC BLOOD PRESSURE: 121 MMHG | WEIGHT: 230.3 LBS | DIASTOLIC BLOOD PRESSURE: 77 MMHG | HEART RATE: 73 BPM | TEMPERATURE: 97.7 F | RESPIRATION RATE: 16 BRPM | OXYGEN SATURATION: 97 %

## 2021-08-27 DIAGNOSIS — C82.08 FOLLICULAR LYMPHOMA GRADE I, LYMPH NODES OF MULTIPLE SITES (H): Primary | ICD-10-CM

## 2021-08-27 PROCEDURE — 96374 THER/PROPH/DIAG INJ IV PUSH: CPT

## 2021-08-27 PROCEDURE — 250N000011 HC RX IP 250 OP 636: Performed by: STUDENT IN AN ORGANIZED HEALTH CARE EDUCATION/TRAINING PROGRAM

## 2021-08-27 PROCEDURE — 99214 OFFICE O/P EST MOD 30 MIN: CPT

## 2021-08-27 PROCEDURE — G0463 HOSPITAL OUTPT CLINIC VISIT: HCPCS | Mod: 25

## 2021-08-27 RX ORDER — HEPARIN SODIUM,PORCINE 10 UNIT/ML
5 VIAL (ML) INTRAVENOUS
Status: DISCONTINUED | OUTPATIENT
Start: 2021-08-27 | End: 2021-08-27 | Stop reason: HOSPADM

## 2021-08-27 RX ADMIN — PROCHLORPERAZINE EDISYLATE 5 MG: 5 INJECTION INTRAMUSCULAR; INTRAVENOUS at 10:08

## 2021-08-27 RX ADMIN — Medication 5 ML: at 09:30

## 2021-08-27 RX ADMIN — Medication 5 ML: at 10:13

## 2021-08-27 ASSESSMENT — PAIN SCALES - GENERAL: PAINLEVEL: NO PAIN (0)

## 2021-08-27 NOTE — NURSING NOTE
Chief Complaint   Patient presents with     Oncology Clinic Visit     Follicular lymphoma grade I     Blood Draw     Labs drawn via PICC by RN in lab. VS taken.      Labs drawn via PICC by RN.  Line flushed and Heparin locked. Vital signs taken. Checked into next appointment.   Gloria Holbrook RN

## 2021-08-27 NOTE — LETTER
8/27/2021         RE: Juvenal Blake  [address] [place] [address]        Dear Colleague,    Thank you for referring your patient, Juvenal Blake, to the [facility] BLOOD AND MARROW TRANSPLANT PROGRAM [place]. Please see a copy of my visit note below.    BMT Clinic Note      Juvenal Blake is a 55 year old male PMH significant for refractory NHL, undergoing  NK infusion. Admitted with rituxan (biosimilar) infusion reaction 6/24 and remained admitted through completion of lymphodepleting chemo and  NK cell infusion.  Currently day +60 after initiation .  Post LD chemo and Day +3 cycle 2 .     HPI: Diarrhea resolved. Mild persistent nausea without vomiting. No fevers, stable dry intermittent cough, not worse. No SOB. No rashes, bruises or bleeding.    ROS: 10 point ROS negative except as above.      Physical Exam:     Blood pressure 121/77, pulse 73, temperature 97.7  F (36.5  C), temperature source Tympanic, resp. rate 16, weight 104.5 kg (230 lb 4.8 oz), SpO2 97 %.    Wt Readings from Last 4 Encounters:   08/27/21 104.5 kg (230 lb 4.8 oz)   08/26/21 104.8 kg (231 lb)   08/25/21 105.3 kg (232 lb 3.2 oz)   08/24/21 113.9 kg (251 lb)       General: NAD, alert oriented  Head/ears:  head is normocephalic and atraumatic without tenderness  Eyes: sclera anicteric, noninjected  Mouth: OP moist without lesions. R front tooth/central incisor chipped down to base per pt, flipper in place  Lungs: CTAB, no cough or coarse sounds today   Cardiovascular: RRR, no m/r/g  Abdominal/Rectal: normoactive BS, protuberant, no palpable organomegalies or masses.  Lymphatics: trace - 1-+ LE edema bilaterally   Skin: no rash; some bruising  Neuro: non-focal, nose to finger intact, gait is normal , CN II-XII intact  Access: R arm PICC NT - no sign of infection arm is not swollen  Port-A-Cath on the right upper chest not accessed      ICANS: 10/10 (8/27/21)- pt sentence log is in research folder in  the [facility] [place] [facility].      ECOG 1   8/27/2021    Labs:  Lab Results   Component Value Date    WBC 0.9 (LL) 08/26/2021    ANEU 0.7 (L) 08/26/2021    HGB 10.7 (L) 08/26/2021    HCT 30.2 (L) 08/26/2021    PLT 56 (L) 08/26/2021     08/26/2021    POTASSIUM 3.9 08/26/2021    CHLORIDE 106 08/26/2021    CO2 28 08/26/2021     (H) 08/26/2021    BUN 11 08/26/2021    CR 0.87 08/26/2021    MAG 1.9 08/26/2021    INR 1.07 06/28/2021    BILITOTAL 1.1 08/26/2021    AST 57 (H) 08/26/2021     (H) 08/26/2021    ALKPHOS 99 08/26/2021    PROTTOTAL 6.2 (L) 08/26/2021    ALBUMIN 3.9 08/26/2021       I have assessed all abnormal lab values for their clinical significance and any values considered clinically significant have been addressed in the assessment and plan.         Assessment and Plan:   Juvenal Blake is a 55 year old male PMH significant for refractory NHL, undergoing  NK infusion. Admitted with rituxan (biosimilar) infusion reaction 6/24 and remained admitted through completion of lymphodepleting chemo and  NK cell infusion.      1. Follicular Lymphoma:   Good response to initial therapy.    Post reaction to the bio similar rituximab and that will not be used again.    Dr. Zavala discussed the pros and cons of proceeding with a second cycle of .  Per protocol he should not have any active infections :he has  tooth that is broken which could be a potential site for infection.  However it will be 2 months before he is able to fix his tooth and delaying therapy that long is not in his best interest.  He meets criteria to proceed with therapy and he is agreeable with the plan.      8/22 UA=3.9, resumed allopurinol ( make sure taking full tablet)  Completed LD chemo, 8/25-  NK infusion     2. HEME: WBC trending down, WBC=0.7 and ANC=0.3. Will restart prophy antibiotics as below  Keep hgb >7g/dL, plt>10,000. plt improving today. WBC still low    3. CV: Cardiology cleared him to proceed with no further  testing. The CT angiogram showed no lesion requiring stenting or bypass.    4. : The patient had no  complaints today   Post radical prostatectomy and bilateral lymph node dissection. November 15, 2017. Prostatic adenocarcinoma [place] 4+3 = 7 with tertiary pattern 5. Tumor involves 45% of total surface area. Positive for extensive extraprostatic extension. Positive for bilateral seminal vesicle invasion. Multiple margins positive. Lymphovascular invasion not identified. Perineural invasion was noted. Lymph nodes negative. 3 were examined (2 right obturator and 1 left obturator). Completed radiation to the prostate fossa and pelvic lymph nodes at [facility] early May 2018. He received 4500 cGy in 25 fractions. He then had 2340 cGy boost in 13 fractions to the prostatic fossa, for a total dose of 6240 cGy in 38 treatment fractions delivered over 54 days. He did not receive hormonal therapy:       5. ID:  Afebrile but now with lower counts. 8/26 Restarted fluconazole 200 mg po daily, increase acyclovir to 800mg bid and cefdinir and avoid levaquin with history of c diff  - hx C diff colitis: finished vancomycin 6/23, treated for 7 days. Ppx bid vanco while neutropenic, now off.  See GI below.  Bactrim: he has not been taking bactrim- he says he was told to stop sometime-inquired if this is for current protocol or just 1 year post NAM NK therapy? Research RN said there is no guidance and pharmacist said they think this is 6 months post NAM NK, reviewed 2019-25 FATE and says prophy per institutional standard    6.FEN/Renal: Cr/lytes  Receiving supplement IV phos as needed     7. GI:   Nausea after LD chemo, prn compazine is helping.  Diarrhea:  8/25 c diff=neg and enteric=neg, adeno pending.  Imodium ok, diarrhea resolved 8/27    Abdominal Pain secondary to cancer, mid abdomen when goes over bump: oxycodone prn. ransaminitis: suspected related to cancer. Limit Tylenol, etoh and monitor. Mild ALT elevation of  83 (8/20), then back in normal range.  8/26  ALT and AST elevated again, likely due to chemo, will monitor.    8. Dental: He was seen by a dentist as described in previous notes.  The identified no infection but no repair is possible for at least the next 2 months because of scheduling.  There is a possible increased risk of infection at that site and therefore he will need prophylactic antibiotics when neutropenic.   Denies tooth pain today    Final Plan/Dispo:  No medication changes today. No transfusions.  RTC Monday, Wednesday per study requirements    30 minutes spent on the date of the encounter doing chart review, review of test results, interpretation of tests, patient visit, documentation and discussion with other provider(s)       Narcisa Stewart PA-C   [phone]

## 2021-08-27 NOTE — NURSING NOTE
"Oncology Rooming Note    August 27, 2021 9:42 AM   Juvenal Blake is a 55 year old male who presents for:    Chief Complaint   Patient presents with     Oncology Clinic Visit     Follicular lymphoma grade I     Blood Draw     Labs drawn via PICC by RN in lab. VS taken.      Initial Vitals: /77   Pulse 73   Temp 97.7  F (36.5  C) (Tympanic)   Resp 16   Wt 104.5 kg (230 lb 4.8 oz)   SpO2 97%   BMI 35.03 kg/m   Estimated body mass index is 35.03 kg/m  as calculated from the following:    Height as of 8/26/21: 1.727 m (5' 7.99\").    Weight as of this encounter: 104.5 kg (230 lb 4.8 oz). Body surface area is 2.24 meters squared.  No Pain (0) Comment: Data Unavailable   No LMP for male patient.  Allergies reviewed: Yes  Medications reviewed: Yes    Medications: MEDICATION REFILLS NEEDED TODAY. Provider was notified.  Pharmacy name entered into [employer]: [id number] - [place], [place] - [address] AT [facility]    Clinical concerns: Medication refill: tolterodine.       Dagmar Delgado LPN              "

## 2021-08-27 NOTE — NURSING NOTE
PICC Dressing change was completed. Sterile technique was used. Site WDL. Patient tolerated dressing change well and had no questions. See Dock Flowsheet.     Rox Ward, CMA

## 2021-08-27 NOTE — PROGRESS NOTES
MT  2019-25: Study Visit Note   Subject name: Juvenal MCGRAW aHyden     Visit: Cycle 2 Day 3     Did the study visit occur within the appropriate window allowed by the protocol? Yes    Discussed research visit with Narcisa Mark and informed her of need for [facility] assessment. Writing tool is in Juvenal's research folder.     I have personally reviewed Juvenal's medical record for adverse events and concomitant medications and these have been recorded on the corresponding logs in Juvenal Blake's research file.     Please see provider progress note for physical exam and other clinical information. Labs were reviewed - any significant lab values were addressed and reviewed.    Emilia Castillo RN

## 2021-08-29 ENCOUNTER — LAB (OUTPATIENT)
Dept: FAMILY MEDICINE | Facility: CLINIC | Age: 56
End: 2021-08-29
Payer: COMMERCIAL

## 2021-08-29 DIAGNOSIS — C82.00 FOLLICULAR LYMPHOMA GRADE I, UNSPECIFIED BODY REGION (H): ICD-10-CM

## 2021-08-29 PROCEDURE — U0005 INFEC AGEN DETEC AMPLI PROBE: HCPCS

## 2021-08-29 PROCEDURE — U0003 INFECTIOUS AGENT DETECTION BY NUCLEIC ACID (DNA OR RNA); SEVERE ACUTE RESPIRATORY SYNDROME CORONAVIRUS 2 (SARS-COV-2) (CORONAVIRUS DISEASE [COVID-19]), AMPLIFIED PROBE TECHNIQUE, MAKING USE OF HIGH THROUGHPUT TECHNOLOGIES AS DESCRIBED BY CMS-2020-01-R: HCPCS

## 2021-08-30 ENCOUNTER — ONCOLOGY VISIT (OUTPATIENT)
Dept: TRANSPLANT | Facility: CLINIC | Age: 56
End: 2021-08-30
Payer: COMMERCIAL

## 2021-08-30 ENCOUNTER — APPOINTMENT (OUTPATIENT)
Dept: LAB | Facility: CLINIC | Age: 56
End: 2021-08-30
Payer: COMMERCIAL

## 2021-08-30 VITALS
DIASTOLIC BLOOD PRESSURE: 78 MMHG | RESPIRATION RATE: 16 BRPM | HEART RATE: 70 BPM | WEIGHT: 228.7 LBS | TEMPERATURE: 98.3 F | OXYGEN SATURATION: 99 % | SYSTOLIC BLOOD PRESSURE: 121 MMHG | BODY MASS INDEX: 34.78 KG/M2

## 2021-08-30 DIAGNOSIS — C82.08 FOLLICULAR LYMPHOMA GRADE I, LYMPH NODES OF MULTIPLE SITES (H): ICD-10-CM

## 2021-08-30 LAB
ANION GAP SERPL CALCULATED.3IONS-SCNC: 6 MMOL/L (ref 3–14)
BASOPHILS # BLD MANUAL: 0 10E3/UL (ref 0–0.2)
BASOPHILS NFR BLD MANUAL: 0 %
BUN SERPL-MCNC: 16 MG/DL (ref 7–30)
CALCIUM SERPL-MCNC: 9.4 MG/DL (ref 8.5–10.1)
CHLORIDE BLD-SCNC: 108 MMOL/L (ref 94–109)
CO2 SERPL-SCNC: 29 MMOL/L (ref 20–32)
CREAT SERPL-MCNC: 0.87 MG/DL (ref 0.66–1.25)
DACRYOCYTES BLD QL SMEAR: SLIGHT
ELLIPTOCYTES BLD QL SMEAR: SLIGHT
EOSINOPHIL # BLD MANUAL: 0.1 10E3/UL (ref 0–0.7)
EOSINOPHIL NFR BLD MANUAL: 13 %
ERYTHROCYTE [DISTWIDTH] IN BLOOD BY AUTOMATED COUNT: 13.1 % (ref 10–15)
GFR SERPL CREATININE-BSD FRML MDRD: >90 ML/MIN/1.73M2
GLUCOSE BLD-MCNC: 122 MG/DL (ref 70–99)
HCT VFR BLD AUTO: 28.5 % (ref 40–53)
HGB BLD-MCNC: 10 G/DL (ref 13.3–17.7)
LYMPHOCYTES # BLD MANUAL: 0.3 10E3/UL (ref 0.8–5.3)
LYMPHOCYTES NFR BLD MANUAL: 56 %
MCH RBC QN AUTO: 32.4 PG (ref 26.5–33)
MCHC RBC AUTO-ENTMCNC: 35.1 G/DL (ref 31.5–36.5)
MCV RBC AUTO: 92 FL (ref 78–100)
MONOCYTES # BLD MANUAL: 0.1 10E3/UL (ref 0–1.3)
MONOCYTES NFR BLD MANUAL: 23 %
NEUTROPHILS # BLD MANUAL: 0 10E3/UL (ref 1.6–8.3)
NEUTROPHILS NFR BLD MANUAL: 8 %
PLAT MORPH BLD: ABNORMAL
PLATELET # BLD AUTO: 68 10E3/UL (ref 150–450)
POTASSIUM BLD-SCNC: 4 MMOL/L (ref 3.4–5.3)
RBC # BLD AUTO: 3.09 10E6/UL (ref 4.4–5.9)
RBC MORPH BLD: ABNORMAL
SARS-COV-2 RNA RESP QL NAA+PROBE: NEGATIVE
SODIUM SERPL-SCNC: 143 MMOL/L (ref 133–144)
WBC # BLD AUTO: 0.5 10E3/UL (ref 4–11)

## 2021-08-30 PROCEDURE — 80048 BASIC METABOLIC PNL TOTAL CA: CPT

## 2021-08-30 PROCEDURE — 99214 OFFICE O/P EST MOD 30 MIN: CPT

## 2021-08-30 PROCEDURE — 36592 COLLECT BLOOD FROM PICC: CPT

## 2021-08-30 PROCEDURE — 250N000011 HC RX IP 250 OP 636

## 2021-08-30 PROCEDURE — 85027 COMPLETE CBC AUTOMATED: CPT

## 2021-08-30 PROCEDURE — 250N000011 HC RX IP 250 OP 636: Performed by: PHYSICIAN ASSISTANT

## 2021-08-30 PROCEDURE — G0463 HOSPITAL OUTPT CLINIC VISIT: HCPCS | Mod: 25

## 2021-08-30 RX ORDER — HEPARIN SODIUM,PORCINE 10 UNIT/ML
5 VIAL (ML) INTRAVENOUS ONCE
Status: COMPLETED | OUTPATIENT
Start: 2021-08-30 | End: 2021-08-30

## 2021-08-30 RX ORDER — HEPARIN SODIUM,PORCINE 10 UNIT/ML
5 VIAL (ML) INTRAVENOUS
Status: CANCELLED | OUTPATIENT
Start: 2021-08-30

## 2021-08-30 RX ORDER — HEPARIN SODIUM,PORCINE 10 UNIT/ML
5 VIAL (ML) INTRAVENOUS
Status: DISCONTINUED | OUTPATIENT
Start: 2021-08-30 | End: 2021-08-30 | Stop reason: HOSPADM

## 2021-08-30 RX ADMIN — Medication 5 ML: at 10:23

## 2021-08-30 RX ADMIN — Medication 5 ML: at 09:48

## 2021-08-30 RX ADMIN — Medication 5 ML: at 09:47

## 2021-08-30 RX ADMIN — PROCHLORPERAZINE EDISYLATE 5 MG: 5 INJECTION INTRAMUSCULAR; INTRAVENOUS at 10:18

## 2021-08-30 ASSESSMENT — PAIN SCALES - GENERAL: PAINLEVEL: NO PAIN (1)

## 2021-08-30 NOTE — NURSING NOTE
"Oncology Rooming Note    August 30, 2021 10:01 AM   Juvenal Blake is a 55 year old male who presents for:    Chief Complaint   Patient presents with     Blood Draw     Labs drawn via PICC by RN. VS taken.     Oncology Clinic Visit     Return; Lymphoma     Initial Vitals: /78   Pulse 70   Temp 98.3  F (36.8  C) (Oral)   Resp 16   Wt 103.7 kg (228 lb 11.2 oz)   SpO2 99%   BMI 34.78 kg/m   Estimated body mass index is 34.78 kg/m  as calculated from the following:    Height as of 8/26/21: 1.727 m (5' 7.99\").    Weight as of this encounter: 103.7 kg (228 lb 11.2 oz). Body surface area is 2.23 meters squared.  No Pain (1) Comment: Data Unavailable   No LMP for male patient.  Allergies reviewed: Yes  Medications reviewed: Yes    Medications: Medication refills not needed today.  Pharmacy name entered into [employer]: [id number] - [place], [place] - [address] AT [facility]    Clinical concerns:        Maryana Duarte CMA              "

## 2021-08-30 NOTE — PROGRESS NOTES
BMT Clinic Note      Juvenal Blake is a 55 year old male PMH significant for refractory NHL, undergoing  NK infusion. Admitted with rituxan (biosimilar) infusion reaction 6/24 and remained admitted through completion of lymphodepleting chemo and  NK cell infusion.  Currently day +63 after initiation .  Post LD chemo and Day +3 cycle 2 .     HPI: . Mild persistent nausea without vomiting. No fevers, stable dry intermittent cough, not worse. No SOB. No rashes, bruises or bleeding. He is fatigued but doing okay.     ROS: 10 point ROS negative except as above.      Physical Exam:     Blood pressure 121/78, pulse 70, temperature 98.3  F (36.8  C), temperature source Oral, resp. rate 16, weight 103.7 kg (228 lb 11.2 oz), SpO2 99 %.    Wt Readings from Last 4 Encounters:   08/27/21 104.5 kg (230 lb 4.8 oz)   08/26/21 104.8 kg (231 lb)   08/25/21 105.3 kg (232 lb 3.2 oz)   08/24/21 113.9 kg (251 lb)       General: NAD, alert oriented  Head/ears:  head is normocephalic and atraumatic without tenderness  Eyes: sclera anicteric, noninjected  Mouth: OP moist without lesions. R front tooth/central incisor chipped down to base per pt, flipper in place  Lungs: CTAB, no cough or coarse sounds today   Cardiovascular: RRR, no m/r/g  Abdominal/Rectal: normoactive BS, protuberant, NT to palpation when seated.   Lymphatics: trace - 1-+ LE edema bilaterally   Skin: no rash; some bruising  Neuro: non-focal, nose to finger intact, gait is normal , CN II-XII intact  Access: R arm PICC NT - CDI no erythema.       ICANS: 10/10 (8/30/21)- pt sentence log is in research folder in the [facility].      ECOG 1   8/27/2021    Labs:  Lab Results   Component Value Date    WBC 0.5 (LL) 08/27/2021    ANEU 0.3 (LL) 08/27/2021    HGB 10.1 (L) 08/27/2021    HCT 28.4 (L) 08/27/2021    PLT 68 (L) 08/27/2021     08/27/2021    POTASSIUM 3.9 08/27/2021    CHLORIDE 108 08/27/2021    CO2 29 08/27/2021     (H) 08/27/2021    BUN 14  08/27/2021    CR 0.89 08/27/2021    MAG 1.9 08/26/2021    INR 1.07 06/28/2021    BILITOTAL 0.8 08/27/2021    AST 42 08/27/2021     (H) 08/27/2021    ALKPHOS 98 08/27/2021    PROTTOTAL 6.2 (L) 08/27/2021    ALBUMIN 3.8 08/27/2021       I have assessed all abnormal lab values for their clinical significance and any values considered clinically significant have been addressed in the assessment and plan.         Assessment and Plan:   Juvenal Blake is a 55 year old male PMH significant for refractory NHL, undergoing  NK infusion. Admitted with rituxan (biosimilar) infusion reaction 6/24 and remained admitted through completion of lymphodepleting chemo and  NK cell infusion.      1. Follicular Lymphoma:   Good response to initial therapy.    Post reaction to the bio similar rituximab and that will not be used again.    Dr. Zavala discussed the pros and cons of proceeding with a second cycle of .  Per protocol he should not have any active infections :he has  tooth that is broken which could be a potential site for infection.  However it will be 2 months before he is able to fix his tooth and delaying therapy that long is not in his best interest.  He meets criteria to proceed with therapy and he is agreeable with the plan.      8/22 UA=3.9, resumed allopurinol ( make sure taking full tablet)   Cycle 2 : Completed LD chemo, 8/25-  NK infusion   8/30/21 is cycle 2, day 6.     2. HEME: WBC trending down, WBC=0.7 and ANC=0.3. Will restart prophy antibiotics as below  Keep hgb >7g/dL, plt>10,000. plt improving today. WBC still low    3. CV: Cardiology cleared him to proceed with no further testing. The CT angiogram showed no lesion requiring stenting or bypass.    4. : The patient had no  complaints today   Post radical prostatectomy and bilateral lymph node dissection. November 15, 2017. Prostatic adenocarcinoma [place] 4+3 = 7 with tertiary pattern 5. Tumor involves 45% of total  surface area. Positive for extensive extraprostatic extension. Positive for bilateral seminal vesicle invasion. Multiple margins positive. Lymphovascular invasion not identified. Perineural invasion was noted. Lymph nodes negative. 3 were examined (2 right obturator and 1 left obturator). Completed radiation to the prostate fossa and pelvic lymph nodes at [facility] early May 2018. He received 4500 cGy in 25 fractions. He then had 2340 cGy boost in 13 fractions to the prostatic fossa, for a total dose of 6240 cGy in 38 treatment fractions delivered over 54 days. He did not receive hormonal therapy:       5. ID:  Afebrile but now with lower counts. 8/26 Restarted fluconazole 200 mg po daily, increase acyclovir to 800mg bid and cefdinir and avoid levaquin with history of c diff  - hx C diff colitis: finished vancomycin 6/23, treated for 7 days. Ppx bid vanco while neutropenic, now off.  See GI below.  Bactrim: he has not been taking bactrim- he says he was told to stop sometime-inquired if this is for current protocol or just 1 year post NAM NK therapy? Research RN said there is no guidance and pharmacist said they think this is 6 months post NAM NK, reviewed 2019-25 FATE and says prophy per institutional standard    6.FEN/Renal: Cr/lytes  Receiving supplement IV phos as needed     7. GI:   Nausea after LD chemo, prn compazine is helping.  Diarrhea:  8/25 c diff=neg and enteric=neg, adeno pending.  Imodium ok, diarrhea resolved 8/27    Abdominal Pain secondary to cancer, mid abdomen when goes over bump: oxycodone prn. ransaminitis: suspected related to cancer. Limit Tylenol, etoh and monitor. Mild ALT elevation of 83 (8/20), then back in normal range.  8/26  ALT and AST elevated again, likely due to chemo, will monitor.    8. Dental: He was seen by a dentist as described in previous notes.  The identified no infection but no repair is possible for at least the next 2 months because of scheduling.  There is a  possible increased risk of infection at that site and therefore he will need prophylactic antibiotics when neutropenic.   Denies tooth pain today    Final Plan/Dispo:  No medication changes today. No transfusions.  Give 5mg IV compazine today.   RTC  Wednesday per study requirements  Discussed Neutropenic precautions.     30 minutes spent on the date of the encounter doing chart review, review of test results, interpretation of tests, patient visit, documentation and discussion with other provider(s)       THOMAS Cadleron-C   [phone]

## 2021-08-30 NOTE — LETTER
8/30/2021         RE: Juvenal Blake  [address] [place] [address]        Dear Colleague,    Thank you for referring your patient, Juvenal Blake, to the [facility] BLOOD AND MARROW TRANSPLANT PROGRAM [place]. Please see a copy of my visit note below.    BMT Clinic Note      Juvenal Blake is a 55 year old male PMH significant for refractory NHL, undergoing  NK infusion. Admitted with rituxan (biosimilar) infusion reaction 6/24 and remained admitted through completion of lymphodepleting chemo and  NK cell infusion.  Currently day +63 after initiation .  Post LD chemo and Day +3 cycle 2 .     HPI: . Mild persistent nausea without vomiting. No fevers, stable dry intermittent cough, not worse. No SOB. No rashes, bruises or bleeding. He is fatigued but doing okay.     ROS: 10 point ROS negative except as above.      Physical Exam:     Blood pressure 121/78, pulse 70, temperature 98.3  F (36.8  C), temperature source Oral, resp. rate 16, weight 103.7 kg (228 lb 11.2 oz), SpO2 99 %.    Wt Readings from Last 4 Encounters:   08/27/21 104.5 kg (230 lb 4.8 oz)   08/26/21 104.8 kg (231 lb)   08/25/21 105.3 kg (232 lb 3.2 oz)   08/24/21 113.9 kg (251 lb)       General: NAD, alert oriented  Head/ears:  head is normocephalic and atraumatic without tenderness  Eyes: sclera anicteric, noninjected  Mouth: OP moist without lesions. R front tooth/central incisor chipped down to base per pt, flipper in place  Lungs: CTAB, no cough or coarse sounds today   Cardiovascular: RRR, no m/r/g  Abdominal/Rectal: normoactive BS, protuberant, NT to palpation when seated.   Lymphatics: trace - 1-+ LE edema bilaterally   Skin: no rash; some bruising  Neuro: non-focal, nose to finger intact, gait is normal , CN II-XII intact  Access: R arm PICC NT - CDI no erythema.       ICANS: 10/10 (8/30/21)- pt sentence log is in research folder in the [facility].      ECOG 1   8/27/2021    Labs:  Lab Results   Component  Value Date    WBC 0.5 (LL) 08/27/2021    ANEU 0.3 (LL) 08/27/2021    HGB 10.1 (L) 08/27/2021    HCT 28.4 (L) 08/27/2021    PLT 68 (L) 08/27/2021     08/27/2021    POTASSIUM 3.9 08/27/2021    CHLORIDE 108 08/27/2021    CO2 29 08/27/2021     (H) 08/27/2021    BUN 14 08/27/2021    CR 0.89 08/27/2021    MAG 1.9 08/26/2021    INR 1.07 06/28/2021    BILITOTAL 0.8 08/27/2021    AST 42 08/27/2021     (H) 08/27/2021    ALKPHOS 98 08/27/2021    PROTTOTAL 6.2 (L) 08/27/2021    ALBUMIN 3.8 08/27/2021       I have assessed all abnormal lab values for their clinical significance and any values considered clinically significant have been addressed in the assessment and plan.         Assessment and Plan:   Juvenal Blake is a 55 year old male PMH significant for refractory NHL, undergoing  NK infusion. Admitted with rituxan (biosimilar) infusion reaction 6/24 and remained admitted through completion of lymphodepleting chemo and  NK cell infusion.      1. Follicular Lymphoma:   Good response to initial therapy.    Post reaction to the bio similar rituximab and that will not be used again.    Dr. Zavala discussed the pros and cons of proceeding with a second cycle of .  Per protocol he should not have any active infections :he has  tooth that is broken which could be a potential site for infection.  However it will be 2 months before he is able to fix his tooth and delaying therapy that long is not in his best interest.  He meets criteria to proceed with therapy and he is agreeable with the plan.      8/22 UA=3.9, resumed allopurinol ( make sure taking full tablet)   Cycle 2 : Completed LD chemo, 8/25-  NK infusion   8/30/21 is cycle 2, day 6.     2. HEME: WBC trending down, WBC=0.7 and ANC=0.3. Will restart prophy antibiotics as below  Keep hgb >7g/dL, plt>10,000. plt improving today. WBC still low    3. CV: Cardiology cleared him to proceed with no further testing. The CT angiogram  showed no lesion requiring stenting or bypass.    4. : The patient had no  complaints today   Post radical prostatectomy and bilateral lymph node dissection. November 15, 2017. Prostatic adenocarcinoma [place] 4+3 = 7 with tertiary pattern 5. Tumor involves 45% of total surface area. Positive for extensive extraprostatic extension. Positive for bilateral seminal vesicle invasion. Multiple margins positive. Lymphovascular invasion not identified. Perineural invasion was noted. Lymph nodes negative. 3 were examined (2 right obturator and 1 left obturator). Completed radiation to the prostate fossa and pelvic lymph nodes at [facility] early May 2018. He received 4500 cGy in 25 fractions. He then had 2340 cGy boost in 13 fractions to the prostatic fossa, for a total dose of 6240 cGy in 38 treatment fractions delivered over 54 days. He did not receive hormonal therapy:       5. ID:  Afebrile but now with lower counts. 8/26 Restarted fluconazole 200 mg po daily, increase acyclovir to 800mg bid and cefdinir and avoid levaquin with history of c diff  - hx C diff colitis: finished vancomycin 6/23, treated for 7 days. Ppx bid vanco while neutropenic, now off.  See GI below.  Bactrim: he has not been taking bactrim- he says he was told to stop sometime-inquired if this is for current protocol or just 1 year post NAM NK therapy? Research RN said there is no guidance and pharmacist said they think this is 6 months post NAM NK, reviewed 2019-25 FATE and says prophy per institutional standard    6.FEN/Renal: Cr/lytes  Receiving supplement IV phos as needed     7. GI:   Nausea after LD chemo, prn compazine is helping.  Diarrhea:  8/25 c diff=neg and enteric=neg, adeno pending.  Imodium ok, diarrhea resolved 8/27    Abdominal Pain secondary to cancer, mid abdomen when goes over bump: oxycodone prn. ransaminitis: suspected related to cancer. Limit Tylenol, etoh and monitor. Mild ALT elevation of 83 (8/20), then back in  normal range.  8/26  ALT and AST elevated again, likely due to chemo, will monitor.    8. Dental: He was seen by a dentist as described in previous notes.  The identified no infection but no repair is possible for at least the next 2 months because of scheduling.  There is a possible increased risk of infection at that site and therefore he will need prophylactic antibiotics when neutropenic.   Denies tooth pain today    Final Plan/Dispo:  No medication changes today. No transfusions.  Give 5mg IV compazine today.   RTC  Wednesday per study requirements  Discussed Neutropenic precautions.     30 minutes spent on the date of the encounter doing chart review, review of test results, interpretation of tests, patient visit, documentation and discussion with other provider(s)       Kinga Bauer PA-C   [phone]

## 2021-08-31 NOTE — PROGRESS NOTES
BMT Clinic Note      Juvenal Blake is a 55 year old male PMH significant for refractory NHL, undergoing  NK infusion. Admitted with rituxan (biosimilar) infusion reaction 6/24 and remained admitted through completion of lymphodepleting chemo and  NK cell infusion.   day +65 after initiation . cycle 2, day 8 .     HPI: . Very tired, but otherwise doing OK.   Afebrile with no cough or SOB.  Eating with no N/V/D.  No pain, bleeding or rash.     ROS: 8 point ROS negative except as above.      Physical Exam:     Blood pressure 110/69, pulse 70, temperature 98.1  F (36.7  C), temperature source Oral, resp. rate 16, weight 103.8 kg (228 lb 14.4 oz), SpO2 99 %.    General: NAD, alert oriented  Eyes: sclera anicteric, noninjected  Mouth: OP moist without lesions.   Lungs: CTAB  Cardiovascular: RRR, no m/r/g  Abdominal/Rectal: normoactive BS  Lymphatics: trace edema to LE  Skin: no joão  Neuro: non-foca  Access: R arm PICC NT - CDI no erythema.     ICANS: 10/10 (8/30/21)- pt sentence log is in research folder in the [facility].      ECOG 1   8/27/2021    Labs:  Lab Results   Component Value Date    WBC 0.5 (LL) 09/01/2021    ANEU 0.0 (LL) 08/30/2021    HGB 9.9 (L) 09/01/2021    HCT 28.4 (L) 09/01/2021    PLT 78 (L) 09/01/2021     09/01/2021    POTASSIUM 3.9 09/01/2021    CHLORIDE 106 09/01/2021    CO2 28 09/01/2021     (H) 09/01/2021    BUN 15 09/01/2021    CR 0.90 09/01/2021    MAG 2.1 09/01/2021    INR 1.07 06/28/2021    BILITOTAL 0.7 09/01/2021    AST 19 09/01/2021    ALT 48 09/01/2021    ALKPHOS 95 09/01/2021    PROTTOTAL 6.2 (L) 09/01/2021    ALBUMIN 3.9 09/01/2021     Assessment and Plan:   Juvenal Blake is a 55 year old male PMH significant for refractory NHL, undergoing  NK infusion. Admitted with rituxan (biosimilar) infusion reaction 6/24 and remained admitted through completion of lymphodepleting chemo and  NK cell infusion.      1. Follicular Lymphoma:   Good response to  initial therapy.    Post reaction to the bio similar rituximab and that will not be used again.      - Cont Allopurinol   Cycle 2, day 8 : Completed LD chemo, 8/25 first  NK infusion.  Admit for 2nd infusion today    2. HEME: Keep hgb >7g/dL, plt>10,000.     3. CV: Cardiology cleared him to proceed with no further testing. The CT angiogram showed no lesion requiring stenting or bypass.    4. :   Post radical prostatectomy and bilateral lymph node dissection. November 15, 2017. Prostatic adenocarcinoma [place] 4+3 = 7 with tertiary pattern 5. Tumor involves 45% of total surface area. Positive for extensive extraprostatic extension. Positive for bilateral seminal vesicle invasion. Multiple margins positive. Lymphovascular invasion not identified. Perineural invasion was noted. Lymph nodes negative. 3 were examined (2 right obturator and 1 left obturator). Completed radiation to the prostate fossa and pelvic lymph nodes at [facility] early May 2018. He received 4500 cGy in 25 fractions. He then had 2340 cGy boost in 13 fractions to the prostatic fossa, for a total dose of 6240 cGy in 38 treatment fractions delivered over 54 days. He did not receive hormonal therapy:       5. ID:  Proph  fluconazole 200 mg po daily, acyclovir  800mg bid and cefdinir and avoid levaquin with history of c diff  - hx C diff colitis: finished vancomycin 6/23, treated for 7 days. Ppx bid vanco while neutropenic, now off.    Bactrim: he has not been taking bactrim- he says he was told to stop sometime-inquired if this is for current protocol or just 1 year post NAM NK therapy? Research RN said there is no guidance and pharmacist said they think this is 6 months post NAM NK, reviewed 2019-25 FATE and says prophy per institutional standard    6.FEN/Renal: Cr/lytes stable     7. GI: Abdominal Pain secondary to cancer. Tansaminitis: suspected related to cancer. Limit Tylenol, etoh and monitor. .    8. Dental: He was seen by a  dentist as described in previous notes.  The identified no infection but no repair is possible for at least the next 2 months because of scheduling.  There is a possible increased risk of infection at that site and therefore he will need prophylactic antibiotics when neutropenic.    To 5C today    30 minutes spent on the date of the encounter doing chart review, review of test results, interpretation of tests, patient visit, documentation and discussion with other provider(s)     Catalina Paris

## 2021-09-01 ENCOUNTER — RESEARCH ENCOUNTER (OUTPATIENT)
Dept: TRANSPLANT | Facility: CLINIC | Age: 56
End: 2021-09-01

## 2021-09-01 ENCOUNTER — ONCOLOGY VISIT (OUTPATIENT)
Dept: TRANSPLANT | Facility: CLINIC | Age: 56
End: 2021-09-01
Attending: PHYSICIAN ASSISTANT
Payer: COMMERCIAL

## 2021-09-01 ENCOUNTER — APPOINTMENT (OUTPATIENT)
Dept: LAB | Facility: CLINIC | Age: 56
End: 2021-09-01
Attending: PHYSICIAN ASSISTANT
Payer: COMMERCIAL

## 2021-09-01 ENCOUNTER — HOSPITAL ENCOUNTER (OUTPATIENT)
Facility: CLINIC | Age: 56
Discharge: HOME | End: 2021-09-01
Payer: COMMERCIAL

## 2021-09-01 VITALS
SYSTOLIC BLOOD PRESSURE: 122 MMHG | RESPIRATION RATE: 16 BRPM | WEIGHT: 225.9 LBS | HEART RATE: 62 BPM | BODY MASS INDEX: 35.46 KG/M2 | OXYGEN SATURATION: 97 % | DIASTOLIC BLOOD PRESSURE: 76 MMHG | HEIGHT: 67 IN | TEMPERATURE: 98.2 F

## 2021-09-01 VITALS
HEART RATE: 70 BPM | RESPIRATION RATE: 16 BRPM | TEMPERATURE: 98.1 F | BODY MASS INDEX: 34.81 KG/M2 | DIASTOLIC BLOOD PRESSURE: 69 MMHG | SYSTOLIC BLOOD PRESSURE: 110 MMHG | OXYGEN SATURATION: 99 % | WEIGHT: 228.9 LBS

## 2021-09-01 DIAGNOSIS — C82.08 FOLLICULAR LYMPHOMA GRADE I, LYMPH NODES OF MULTIPLE SITES (H): Primary | ICD-10-CM

## 2021-09-01 LAB
ALBUMIN SERPL-MCNC: 3.9 G/DL (ref 3.4–5)
ALP SERPL-CCNC: 95 U/L (ref 40–150)
ALT SERPL W P-5'-P-CCNC: 48 U/L (ref 0–70)
ANION GAP SERPL CALCULATED.3IONS-SCNC: 8 MMOL/L (ref 3–14)
AST SERPL W P-5'-P-CCNC: 19 U/L (ref 0–45)
BILIRUB DIRECT SERPL-MCNC: 0.2 MG/DL (ref 0–0.2)
BILIRUB SERPL-MCNC: 0.7 MG/DL (ref 0.2–1.3)
BUN SERPL-MCNC: 15 MG/DL (ref 7–30)
CALCIUM SERPL-MCNC: 8.9 MG/DL (ref 8.5–10.1)
CHLORIDE BLD-SCNC: 106 MMOL/L (ref 94–109)
CO2 SERPL-SCNC: 28 MMOL/L (ref 20–32)
CREAT SERPL-MCNC: 0.9 MG/DL (ref 0.66–1.25)
CULTURE HARVEST COMPLETE DATE: NORMAL
ERYTHROCYTE [DISTWIDTH] IN BLOOD BY AUTOMATED COUNT: 14.3 % (ref 10–15)
GFR SERPL CREATININE-BSD FRML MDRD: >90 ML/MIN/1.73M2
GLUCOSE BLD-MCNC: 131 MG/DL (ref 70–99)
HCT VFR BLD AUTO: 28.4 % (ref 40–53)
HGB BLD-MCNC: 9.9 G/DL (ref 13.3–17.7)
LDH SERPL L TO P-CCNC: 172 U/L (ref 85–227)
MAGNESIUM SERPL-MCNC: 2.1 MG/DL (ref 1.6–2.3)
MCH RBC QN AUTO: 32.5 PG (ref 26.5–33)
MCHC RBC AUTO-ENTMCNC: 34.9 G/DL (ref 31.5–36.5)
MCV RBC AUTO: 93 FL (ref 78–100)
PHOSPHATE SERPL-MCNC: 2.7 MG/DL (ref 2.5–4.5)
PLATELET # BLD AUTO: 78 10E3/UL (ref 150–450)
POTASSIUM BLD-SCNC: 3.9 MMOL/L (ref 3.4–5.3)
PROT SERPL-MCNC: 6.2 G/DL (ref 6.8–8.8)
RBC # BLD AUTO: 3.05 10E6/UL (ref 4.4–5.9)
SODIUM SERPL-SCNC: 142 MMOL/L (ref 133–144)
WBC # BLD AUTO: 0.5 10E3/UL (ref 4–11)

## 2021-09-01 PROCEDURE — 96361 HYDRATE IV INFUSION ADD-ON: CPT

## 2021-09-01 PROCEDURE — 250N000011 HC RX IP 250 OP 636: Performed by: NURSE PRACTITIONER

## 2021-09-01 PROCEDURE — 84100 ASSAY OF PHOSPHORUS: CPT | Performed by: INTERNAL MEDICINE

## 2021-09-01 PROCEDURE — 96374 THER/PROPH/DIAG INJ IV PUSH: CPT

## 2021-09-01 PROCEDURE — 99214 OFFICE O/P EST MOD 30 MIN: CPT | Mod: 25

## 2021-09-01 PROCEDURE — 82248 BILIRUBIN DIRECT: CPT | Performed by: INTERNAL MEDICINE

## 2021-09-01 PROCEDURE — 83735 ASSAY OF MAGNESIUM: CPT | Performed by: INTERNAL MEDICINE

## 2021-09-01 PROCEDURE — G0463 HOSPITAL OUTPT CLINIC VISIT: HCPCS | Mod: 25

## 2021-09-01 PROCEDURE — 83615 LACTATE (LD) (LDH) ENZYME: CPT | Performed by: INTERNAL MEDICINE

## 2021-09-01 PROCEDURE — 250N000011 HC RX IP 250 OP 636

## 2021-09-01 PROCEDURE — 36592 COLLECT BLOOD FROM PICC: CPT

## 2021-09-01 PROCEDURE — 250N000011 HC RX IP 250 OP 636: Performed by: PHYSICIAN ASSISTANT

## 2021-09-01 PROCEDURE — 85027 COMPLETE CBC AUTOMATED: CPT | Performed by: INTERNAL MEDICINE

## 2021-09-01 PROCEDURE — 258N000003 HC RX IP 258 OP 636: Performed by: INTERNAL MEDICINE

## 2021-09-01 PROCEDURE — 250N000013 HC RX MED GY IP 250 OP 250 PS 637: Performed by: INTERNAL MEDICINE

## 2021-09-01 PROCEDURE — 82040 ASSAY OF SERUM ALBUMIN: CPT | Performed by: INTERNAL MEDICINE

## 2021-09-01 RX ORDER — EPINEPHRINE 1 MG/ML
0.3 INJECTION, SOLUTION, CONCENTRATE INTRAVENOUS EVERY 5 MIN PRN
Status: DISCONTINUED | OUTPATIENT
Start: 2021-09-01 | End: 2021-09-01 | Stop reason: HOSPADM

## 2021-09-01 RX ORDER — ACETAMINOPHEN 325 MG/1
650 TABLET ORAL EVERY 4 HOURS
Status: COMPLETED | OUTPATIENT
Start: 2021-09-01 | End: 2021-09-01

## 2021-09-01 RX ORDER — HEPARIN SODIUM,PORCINE 10 UNIT/ML
5 VIAL (ML) INTRAVENOUS
Status: DISCONTINUED | OUTPATIENT
Start: 2021-09-01 | End: 2021-09-01 | Stop reason: HOSPADM

## 2021-09-01 RX ORDER — MEPERIDINE HYDROCHLORIDE 25 MG/ML
25-50 INJECTION INTRAMUSCULAR; INTRAVENOUS; SUBCUTANEOUS
Status: DISCONTINUED | OUTPATIENT
Start: 2021-09-01 | End: 2021-09-01 | Stop reason: HOSPADM

## 2021-09-01 RX ORDER — HEPARIN SODIUM,PORCINE 10 UNIT/ML
3 VIAL (ML) INTRAVENOUS
Status: DISCONTINUED | OUTPATIENT
Start: 2021-09-01 | End: 2021-09-01 | Stop reason: HOSPADM

## 2021-09-01 RX ORDER — DIPHENHYDRAMINE HYDROCHLORIDE 50 MG/ML
50 INJECTION INTRAMUSCULAR; INTRAVENOUS
Status: DISCONTINUED | OUTPATIENT
Start: 2021-09-01 | End: 2021-09-01 | Stop reason: HOSPADM

## 2021-09-01 RX ORDER — ALBUTEROL SULFATE 90 UG/1
1-2 AEROSOL, METERED RESPIRATORY (INHALATION)
Status: DISCONTINUED | OUTPATIENT
Start: 2021-09-01 | End: 2021-09-01 | Stop reason: HOSPADM

## 2021-09-01 RX ORDER — DIPHENHYDRAMINE HCL 25 MG
25 CAPSULE ORAL EVERY 4 HOURS
Status: COMPLETED | OUTPATIENT
Start: 2021-09-01 | End: 2021-09-01

## 2021-09-01 RX ORDER — ALBUTEROL SULFATE 0.83 MG/ML
2.5 SOLUTION RESPIRATORY (INHALATION)
Status: DISCONTINUED | OUTPATIENT
Start: 2021-09-01 | End: 2021-09-01 | Stop reason: HOSPADM

## 2021-09-01 RX ORDER — MEPERIDINE HYDROCHLORIDE 25 MG/ML
25 INJECTION INTRAMUSCULAR; INTRAVENOUS; SUBCUTANEOUS EVERY 30 MIN PRN
Status: DISCONTINUED | OUTPATIENT
Start: 2021-09-01 | End: 2021-09-01 | Stop reason: HOSPADM

## 2021-09-01 RX ORDER — NALOXONE HYDROCHLORIDE 0.4 MG/ML
0.2 INJECTION, SOLUTION INTRAMUSCULAR; INTRAVENOUS; SUBCUTANEOUS
Status: DISCONTINUED | OUTPATIENT
Start: 2021-09-01 | End: 2021-09-01 | Stop reason: HOSPADM

## 2021-09-01 RX ORDER — DIPHENHYDRAMINE HCL 25 MG
25 CAPSULE ORAL ONCE
Status: DISCONTINUED | OUTPATIENT
Start: 2021-09-01 | End: 2021-09-01 | Stop reason: HOSPADM

## 2021-09-01 RX ORDER — ACETAMINOPHEN 325 MG/1
650 TABLET ORAL ONCE
Status: DISCONTINUED | OUTPATIENT
Start: 2021-09-01 | End: 2021-09-01 | Stop reason: HOSPADM

## 2021-09-01 RX ORDER — METHYLPREDNISOLONE SODIUM SUCCINATE 125 MG/2ML
125 INJECTION, POWDER, LYOPHILIZED, FOR SOLUTION INTRAMUSCULAR; INTRAVENOUS
Status: DISCONTINUED | OUTPATIENT
Start: 2021-09-01 | End: 2021-09-01 | Stop reason: HOSPADM

## 2021-09-01 RX ADMIN — DIPHENHYDRAMINE HYDROCHLORIDE 25 MG: 25 CAPSULE ORAL at 10:36

## 2021-09-01 RX ADMIN — Medication 5 ML: at 07:05

## 2021-09-01 RX ADMIN — SODIUM CHLORIDE 250 ML: 9 INJECTION, SOLUTION INTRAVENOUS at 10:37

## 2021-09-01 RX ADMIN — ACETAMINOPHEN 650 MG: 325 TABLET, FILM COATED ORAL at 14:34

## 2021-09-01 RX ADMIN — SODIUM CHLORIDE 250 ML: 9 INJECTION, SOLUTION INTRAVENOUS at 12:37

## 2021-09-01 RX ADMIN — Medication 5 ML: at 07:06

## 2021-09-01 RX ADMIN — ACETAMINOPHEN 650 MG: 325 TABLET, FILM COATED ORAL at 10:37

## 2021-09-01 RX ADMIN — DIPHENHYDRAMINE HYDROCHLORIDE 25 MG: 25 CAPSULE ORAL at 14:35

## 2021-09-01 RX ADMIN — Medication 3 ML: at 15:13

## 2021-09-01 RX ADMIN — PROCHLORPERAZINE EDISYLATE 10 MG: 5 INJECTION INTRAMUSCULAR; INTRAVENOUS at 13:10

## 2021-09-01 ASSESSMENT — PAIN SCALES - GENERAL: PAINLEVEL: NO PAIN (0)

## 2021-09-01 ASSESSMENT — MIFFLIN-ST. JEOR: SCORE: 1813.43

## 2021-09-01 NOTE — PROCEDURES
Admission to Early Phase Research Unit     Sept 1, 2021     Clinical Trial: , C2 dose 8.     Principle Investigator: Scott Zavala     Research Nurse: Sarah Beth Kessler     Common Side Effects & Recommended Intervention(s):    ICANS 10/10 at 1420 on 9/1/21        Juvenal Blake was admitted to the early phase research unit at the [facility] for administration of therapy and clinical monitoring. The patient has been assessed by a provider prior to admission and medically cleared for therapy. In the case of an adverse event or change in clinical status requiring additional medical management, the PI will be contacted and the decision to admit the patient to the 5C unit will be determined by the BMT team B advanced practice provider and collaborating physician. The attending physician on service is Hoang Ruelas, [phone].

## 2021-09-01 NOTE — PROGRESS NOTES
5C Phase 1 patient     Here for study drug:    Administered: IV     Type of line used: PICC    Location of injection: IL-2 injection in left abdomen    Premeds given: tylenol 650 mg & benadryl 25 mg  Premeds given at: 1037    Post meds: tylenol 650 mg & benadryl 25 mg  Post meds given at: 1435    Fluids: NS @ 250 ml/hr  Fluids given at: 1037  Fluids stopped at: 1435    EKGs: n/a    Labs: pre/post PK labs drawn and sent    Tolerated/side effects: tolerated well, No side effects  Interventions for side effects: n/a    Meet discharge criteria: yes  Discharged at:  1530    _______________________________________________

## 2021-09-01 NOTE — LETTER
9/1/2021         RE: Juvenal Blake  [address] [place] [address]        Dear Colleague,    Thank you for referring your patient, Juvenal Blake, to the [facility] BLOOD AND MARROW TRANSPLANT PROGRAM [place]. Please see a copy of my visit note below.    BMT Clinic Note      Juvenal Blake is a 55 year old male PMH significant for refractory NHL, undergoing  NK infusion. Admitted with rituxan (biosimilar) infusion reaction 6/24 and remained admitted through completion of lymphodepleting chemo and  NK cell infusion.   day +65 after initiation . cycle 2, day 8 .     HPI: . Very tired, but otherwise doing OK.   Afebrile with no cough or SOB.  Eating with no N/V/D.  No pain, bleeding or rash.     ROS: 8 point ROS negative except as above.      Physical Exam:     Blood pressure 110/69, pulse 70, temperature 98.1  F (36.7  C), temperature source Oral, resp. rate 16, weight 103.8 kg (228 lb 14.4 oz), SpO2 99 %.    General: NAD, alert oriented  Eyes: sclera anicteric, noninjected  Mouth: OP moist without lesions.   Lungs: CTAB  Cardiovascular: RRR, no m/r/g  Abdominal/Rectal: normoactive BS  Lymphatics: trace edema to LE  Skin: no joão  Neuro: non-foca  Access: R arm PICC NT - CDI no erythema.     ICANS: 10/10 (8/30/21)- pt sentence log is in research folder in the [facility].      ECOG 1   8/27/2021    Labs:  Lab Results   Component Value Date    WBC 0.5 (LL) 09/01/2021    ANEU 0.0 (LL) 08/30/2021    HGB 9.9 (L) 09/01/2021    HCT 28.4 (L) 09/01/2021    PLT 78 (L) 09/01/2021     09/01/2021    POTASSIUM 3.9 09/01/2021    CHLORIDE 106 09/01/2021    CO2 28 09/01/2021     (H) 09/01/2021    BUN 15 09/01/2021    CR 0.90 09/01/2021    MAG 2.1 09/01/2021    INR 1.07 06/28/2021    BILITOTAL 0.7 09/01/2021    AST 19 09/01/2021    ALT 48 09/01/2021    ALKPHOS 95 09/01/2021    PROTTOTAL 6.2 (L) 09/01/2021    ALBUMIN 3.9 09/01/2021     Assessment and Plan:   Juvenal Blake is a 55 year  old male PMH significant for refractory NHL, undergoing  NK infusion. Admitted with rituxan (biosimilar) infusion reaction 6/24 and remained admitted through completion of lymphodepleting chemo and  NK cell infusion.      1. Follicular Lymphoma:   Good response to initial therapy.    Post reaction to the bio similar rituximab and that will not be used again.      - Cont Allopurinol   Cycle 2, day 8 : Completed LD chemo, 8/25 first  NK infusion.  Admit for 2nd infusion today    2. HEME: Keep hgb >7g/dL, plt>10,000.     3. CV: Cardiology cleared him to proceed with no further testing. The CT angiogram showed no lesion requiring stenting or bypass.    4. :   Post radical prostatectomy and bilateral lymph node dissection. November 15, 2017. Prostatic adenocarcinoma [place] 4+3 = 7 with tertiary pattern 5. Tumor involves 45% of total surface area. Positive for extensive extraprostatic extension. Positive for bilateral seminal vesicle invasion. Multiple margins positive. Lymphovascular invasion not identified. Perineural invasion was noted. Lymph nodes negative. 3 were examined (2 right obturator and 1 left obturator). Completed radiation to the prostate fossa and pelvic lymph nodes at [facility] early May 2018. He received 4500 cGy in 25 fractions. He then had 2340 cGy boost in 13 fractions to the prostatic fossa, for a total dose of 6240 cGy in 38 treatment fractions delivered over 54 days. He did not receive hormonal therapy:       5. ID:  Proph  fluconazole 200 mg po daily, acyclovir  800mg bid and cefdinir and avoid levaquin with history of c diff  - hx C diff colitis: finished vancomycin 6/23, treated for 7 days. Ppx bid vanco while neutropenic, now off.    Bactrim: he has not been taking bactrim- he says he was told to stop sometime-inquired if this is for current protocol or just 1 year post NAM NK therapy? Research RN said there is no guidance and pharmacist said they think this is  6 months post NAM NK, reviewed 2019-25 FATE and says prophy per institutional standard    6.FEN/Renal: Cr/lytes stable     7. GI: Abdominal Pain secondary to cancer. Tansaminitis: suspected related to cancer. Limit Tylenol, etoh and monitor. .    8. Dental: He was seen by a dentist as described in previous notes.  The identified no infection but no repair is possible for at least the next 2 months because of scheduling.  There is a possible increased risk of infection at that site and therefore he will need prophylactic antibiotics when neutropenic.    To 5C today    30 minutes spent on the date of the encounter doing chart review, review of test results, interpretation of tests, patient visit, documentation and discussion with other provider(s)     Catalina Paris             Again, thank you for allowing me to participate in the care of your patient.        Sincerely,        BMT Advanced Practice Provider

## 2021-09-01 NOTE — PHARMACY-ADMISSION MEDICATION HISTORY
Admission Medication History Completed by Pharmacy    See [facility] Admission Navigator for allergy information, preferred outpatient pharmacy, prior to admission medications and immunization status.     Medication History Sources:     [facility]     Changes made to PTA medication list (reason):    Added: None    Deleted: None    Changed: None    Additional Information:    None    Prior to Admission medications    Medication Sig Last Dose Taking? Auth Provider   acyclovir (ZOVIRAX) 800 MG tablet Take 1 tablet (800 mg) by mouth every 12 hours   Glory Covarrubias PA-C   albuterol (VENTOLIN HFA) 108 (90 Base) MCG/ACT inhaler INHALE 2 PUFFS BY MOUTH FOUR TIMES DAILY AS NEEDED FOR COUGH OR CONGESTION   Glory Covarrubias PA-C   allopurinol (ZYLOPRIM) 300 MG tablet Take 1 tablet (300 mg) by mouth daily   Jossie Cason PA-C   azelastine (ASTELIN) 0.1 % nasal spray Spray 1 spray into both nostrils 2 times daily as needed for rhinitis  Patient not taking: Reported on 8/20/2021   Glory Covarrubias PA-C   beclomethasone HFA (QVAR REDIHALER) 80 MCG/ACT inhaler Inhale 1 puff into the lungs daily as needed (for reactive airway symptoms)  Patient not taking: Reported on 8/20/2021   Glory Covarrubias PA-C   cefdinir (OMNICEF) 300 MG capsule Take 1 capsule (300 mg) by mouth 2 times daily   Glory Covarrubias PA-C   fluconazole (DIFLUCAN) 200 MG tablet Take 1 tablet (200 mg) by mouth daily   Rosa Terry MD   fluticasone (FLONASE) 50 MCG/ACT nasal spray Spray 1 spray into both nostrils daily as needed for rhinitis or allergies  Patient not taking: Reported on 8/20/2021   Glory Covarrubias PA-C   medical cannabis (Patient's own supply)    Glory Covarrubias PA-C   omeprazole (PRILOSEC) 20 MG DR capsule TAKE 2 CAPSULES(40 MG) BY MOUTH TWICE DAILY BEFORE MEALS   Rob Crooks MD   prochlorperazine (COMPAZINE) 5 MG tablet Take 5 mg by mouth every 6 hours as needed for nausea or vomiting    Glory Covarrubias PA-C   Psyllium (METAMUCIL  FIBER) 51.7 % PACK Take 1 packet by mouth daily    Reported, Patient   tolterodine ER (DETROL LA) 4 MG 24 hr capsule Take 1 capsule (4 mg) by mouth daily   Glory Covarrubias PA-C       Date completed: 09/01/21    Medication history completed by: Cole Adams [facility]

## 2021-09-01 NOTE — PROCEDURES
BMT Post Infusion Documentation    Data   Patient Vitals for the past 72 hrs:   Temp Temp src Pulse Resp BP   09/01/21 0807 98.4  F (36.9  C) Oral 66 16 105/71   09/01/21 1116 98.3  F (36.8  C) Oral 69 18 111/84   09/01/21 1141 97.9  F (36.6  C) Oral 66 16 125/83   09/01/21 1156 98.2  F (36.8  C) Oral 65 16 125/79   09/01/21 1211 98.3  F (36.8  C) Oral 63 16 131/77   09/01/21 1217 97.8  F (36.6  C) Oral 69 16 139/65   09/01/21 1233 98.3  F (36.8  C) Oral 67 16 120/83   09/01/21 1247 98.1  F (36.7  C) Oral 63 16 126/74   09/01/21 1301 98.3  F (36.8  C) Oral 74 16 120/69   09/01/21 1317 98.2  F (36.8  C) Oral 62 16 122/76        BMT INFUSION DOCUMENTATION (last 24 hours)      BMT/Cellular Product Infusion     Row Name 09/01/21 0900                Cell Therapy Documentation    Product Release Date  09/01/21  -NB       Recipient Study ID    -NB       Donor  Allogeneic - Unrelated  -NB       Donor MRN/ID  [id number]  -NB       Donor ABO/Rh  -- N/A  -NB       Allogeneic Donor Eligibility Determination and Summary of Records  Eligible  -NB       Type of Infusion  Allogeneic  -NB       Total Volume Dispensed (mL)  84  -NB       Total NC Dose  N/A  -NB       Total CD34 Dose  N/A  -NB       Total CD3 dose  N/A  -NB       Total NC Dose Left in Storage  NONE  -NB       Comments for Product Issues   Investigational Product;9.00E+08 Total Cells; Lot # [id number]  -NB       Donor ABO/Rh  --       Product Types  --       Product Numbers  --       Product Types and Numbers  --       Volume  --       ABO Mismatch  --       ZZTotal NC Dose  --       ZZTotal CD34 Dose  --       ZZTotal NC Dose Left in Storage  --          [REMOVED] Product 09/01/21 0942 Other (specify in comment)    Product Details Product Release Date: 09/01/21  -NB Product Release Time: 0942 -JL Product Type: Other (specify in comment)  -NB DIN: [id number]  -NB Product Description Code: [id number]  -NB Volume Dispensed (mL): 28 mL  -NB  Completion Date (RN to complete): 09/01/21  -KN Completion Time (RN to complete): 1141  -KN    Checked by (Patient RN)  Tennille Zuniga RN  -KN       Checked by (Witness)  VALERIE Puente       Product Volume Infused (mL)  28 mL  -KN       Flush Volume (mL)  50 mL  -KN       Volume Dispensed (mL)  --          [REMOVED] Product 09/01/21 0942 Other (specify in comment)    Product Details Product Release Date: 09/01/21 -NB Product Release Time: 0942 -JL Product Type: Other (specify in comment)  -NB DIN: [id number]  -NB Product Description Code: [id number]  -NB Volume Dispensed (mL): 28 mL  -NB Completion Date (RN to complete): 09/01/21  -KN Completion Time (RN to complete): 1201  -KN    Checked by (Patient RN)  Tennille Zuniga RN  -KN       Checked by (Witness)  VALERIE Puente       Product Volume Infused (mL)  28 mL  -KN       Flush Volume (mL)  50 mL  -KN       Volume Dispensed (mL)  --          [REMOVED] Product 09/01/21 0942 Other (specify in comment)    Product Details Product Release Date: 09/01/21  -NB Product Release Time: 0942 -JL Product Type: Other (specify in comment)  -NB DIN: [id number]  -NB Product Description Code: [id number]  -NB Volume Dispensed (mL): 28 mL  -NB Completion Date (RN to complete): 09/01/21  -KN Completion Time (RN to complete): 1217  -KN    Checked by (Patient RN)  Tennille Zuniga RN  -KN       Checked by (Witness)  VALERIE Puente       Product Volume Infused (mL)  28 mL  -KN       Flush Volume (mL)  50 mL  -KN       Volume Dispensed (mL)  --          RN Documentation    Patient was premedicated as ordered  yes  -KN       Line Type  central line, right  -KN       Patient Stable Prior to Infusion  yes  -KN       Time Infusion Started  --       Checked by (Patient RN)  --       Checked by (RN 2)  --       Broken Bag?  --       Immediate suspected transfusion reaction to the product  --       Time Infusion Stopped  --       Total Flush Volume (mL)   --       Checked by (Witness)  --       Date Infusion Started  --       Date Infusion Stopped  --       Volume Infused (mL)  --       Total Volume Infused (cc)  --          Patient tolerance of product infusion    Immediate suspected transfusion reaction to the product  none  -KN       Did patient have prior history of similar signs/symptoms during this hospitalization?  NA  -KN       Symptoms during/after infusion  other (comment) n/a  -KN       Did the patient tolerate the infusion well  yes  -KN       Medications and treatment for symptoms  n/a  -KN       Did the symptoms resolve?  -- n/a  -KN       Enter comments if clots, leaks, broken bag, infusion delays, other issues with bag/infusion  --       Describe symptoms  --         User Key  (r) = Recorded By, (t) = Taken By, (c) = Cosigned By    Initials Name Effective Dates    Kianna Graves 07/11/21 -     Tennille Bal RN 12/19/16 -     Dimple Nice 07/11/21 -             Post-Infusion Evaluation:   Infusion Related Reaction: Grade 0 - none  Dyspnea: Grade 0 - none  Hypoxia: Grade 0 - not present  Fever: Grade 0 - afebrile  Chills: Grade 0 - none  Febrile Neutropenia: Grade 0 - not present  Sinus Bradycardia: Grade 0 - none  Hypertension: Grade 0 - none  Hypotension: Grade 0 - none  Chest Pain: Grade 0 - none  Bronchospasm: Grade 0 - none  Pain: Grade 0 - none  Rash: Grade 0 - None  Neurologic Specify: none    If this was a cord blood transplant, was more than one cord blood unit infused? ALESSANDRO Dickens CNP

## 2021-09-01 NOTE — PROGRESS NOTES
Patient admitted to:  1674  Admitted from: clinic  Arrived by: car  Reason for admission: NK cell infusion  Patient accompanied by: wife  Belongings: in room w/ pt  Teaching: oriented to room and unit  Skin double check completed by: n/a

## 2021-09-01 NOTE — PROCEDURES
BMT/Cellular Allogeneic Product Infusion       Patient Vitals for the past 24 hrs:   Temp Temp src Pulse Resp BP   09/01/21 0807 98.4  F (36.9  C) Oral 66 16 105/71   09/01/21 1116 98.3  F (36.8  C) Oral 69 18 111/84   09/01/21 1141 97.9  F (36.6  C) Oral 66 16 125/83   09/01/21 1156 98.2  F (36.8  C) Oral 65 16 125/79         BMT INFUSION DOCUMENTATION (last 48 hours)      BMT/Cellular Product Infusion     Row Name 09/01/21 0900                Product 09/01/21 0942 Other (specify in comment)    Product Details Product Release Date: 09/01/21  -NB Product Release Time: 0942 -JL Product Type: Other (specify in comment)  -NB DIN: [id number]  -NB Product Description Code: [id number]  -NB Volume Dispensed (mL): 28 mL  -NB    Checked by (Patient RN)  Tennille Zuniga RN  -KN       Checked by (Witness)  VALERIE Puente       Product Volume Infused (mL)  28 mL  -KN       Flush Volume (mL)  50 mL  -KN       Volume Dispensed (mL)  --          Product 09/01/21 0942 Other (specify in comment)    Product Details Product Release Date: 09/01/21  -NB Product Release Time: 0942 -JL Product Type: Other (specify in comment)  -NB DIN: [id number]  -NB Product Description Code: [id number]  -NB Volume Dispensed (mL): 28 mL  -NB    Checked by (Patient RN)  Tennille Zuniga RN  -KN       Checked by (Witness)  --       Product Volume Infused (mL)  28 mL  -KN       Flush Volume (mL)  50 mL  -KN       Volume Dispensed (mL)  --          Product 09/01/21 0942 Other (specify in comment)    Product Details Product Release Date: 09/01/21  -NB Product Release Time: 0942 -JL Product Type: Other (specify in comment)  -NB DIN: [id number]  -NB Product Description Code: [id number]  -NB Volume Dispensed (mL): 28 mL  -NB    Checked by (Patient RN)  --       Checked by (Witness)  --       Product Volume Infused (mL)  --       Flush Volume (mL)  --       Volume Dispensed (mL)  --         User Key  (r) = Recorded By, (t) = Taken By, (c) =  Cosigned By    Initials Name Effective Dates    JL Kianna Duvall YARIEL 07/11/21 -     Tennille Bal RN 12/19/16 -     Dimple Nice 07/11/21 -         Allogeneic Donor Eligibility Determination and Summary of Records: Eligible        Type of Infusion: Allogeneic      Baseline Pre-Infusion Evaluation (to be completed by Provider):   Dyspnea: Grade 0 - none  Hypoxia: Grade 0 - not present  Fever: Grade 0 - afebrile  Chills: Grade 0 - none  Febrile Neutropenia: Grade 0 - not present  Sinus Bradycardia: Grade 0 - none  Hypertension: Grade 0 - none  Hypotension: Grade 0 - none  Chest Pain: Grade 0 - none  Bronchospasm: Grade 0 - none  Pain: Grade 0 - none  Rash: Grade 0 - None  Neurologic Specify: none    If adverse reactions, events or complications occur (fever greater than 2 degrees fahrenheit increase, and severe reactions of the following types: chills, dyspnea, bronchospasm, hyper/hypotension, hypoxia, bradycardia, chest pain, back/flank pain, hypoxia, and any other reaction deemed severe or life threatening; any instance of product bag breakage or unusual product appearance)    Any other events that are >= grade 3, then immediately contact the BMT Attending physician, the Cell Therapy Laboratory Medical Director (pager [phone]) and the Cell Therapy Laboratory ([phone]).  After midnight, holidays & weekends contact the [facility] on the appropriate campus ([facility] [place]: [phone]; [facility] [place]: [phone]).    ALESSANDRO Quintanilla CNP

## 2021-09-01 NOTE — PROGRESS NOTES
Type of transplant: Donor: Allogeneic - Unrelated  Product: NK cells     BMT INFUSION DOCUMENTATION (last 48 hours)      BMT/Cellular Product Infusion     Row Name 09/01/21 0900                [REMOVED] Product 09/01/21 0942 Other (specify in comment)    Product Details Product Release Date: 09/01/21  -NB Product Release Time: 0942 -JL Product Type: Other (specify in comment)  -NB DIN: [id number]  -NB Product Description Code: [id number]  -NB Volume Dispensed (mL): 28 mL  -NB Completion Date (RN to complete): 09/01/21  -KN Completion Time (RN to complete): 1141  -KN    Checked by (Patient RN)  Tennille Zuniga RN  -KN       Checked by (Witness)  VALERIE Puente       Product Volume Infused (mL)  28 mL  -KN       Flush Volume (mL)  50 mL  -KN       Volume Dispensed (mL)  --          [REMOVED] Product 09/01/21 0942 Other (specify in comment)    Product Details Product Release Date: 09/01/21  -NB Product Release Time: 0942 -JL Product Type: Other (specify in comment)  -NB DIN: [id number]  -NB Product Description Code: [id number]  -NB Volume Dispensed (mL): 28 mL  -NB Completion Date (RN to complete): 09/01/21  -KN Completion Time (RN to complete): 1201  -KN    Checked by (Patient RN)  Tennille Zuniga RN  -KN       Checked by (Witness)  VALERIE Puente       Product Volume Infused (mL)  28 mL  -KN       Flush Volume (mL)  50 mL  -KN       Volume Dispensed (mL)  --          [REMOVED] Product 09/01/21 0942 Other (specify in comment)    Product Details Product Release Date: 09/01/21  -NB Product Release Time: 0942 -JL Product Type: Other (specify in comment)  -NB DIN: [id number]  -NB Product Description Code: [id number]  -NB Volume Dispensed (mL): 28 mL  -NB Completion Date (RN to complete): 09/01/21  -KN Completion Time (RN to complete): 1217  -KN    Checked by (Patient RN)  Tennille Zuniga RN  -KN       Checked by (Witness)  VALERIE Puente       Product Volume Infused (mL)  28 mL   -KN       Flush Volume (mL)  50 mL  -KN       Volume Dispensed (mL)  --         User Key  (r) = Recorded By, (t) = Taken By, (c) = Cosigned By    Initials Name Effective Dates    Kianna Graves 07/11/21 -     Tennille Bal RN 12/19/16 -     Dimple Nice 07/11/21 -         Preparation: RN Documentation  Patient was premedicated as ordered: yes  Line Type: central line, right  Patient Stable Prior to Infusion: yes  Time Infusion Started: 1126  Teaching: side effects/monitoring  Tolerated/Reaction: Patient tolerance of product infusion  Immediate suspected transfusion reaction to the product: none  Did patient have prior history of similar signs/symptoms during this hospitalization?: NA  Symptoms during/after infusion: other (comment) (n/a)  Did the patient tolerate the infusion well: yes  Medications and treatment for symptoms: n/a  Did the symptoms resolve?:  (n/a)  Flush until: n/a  Plan: pt discharge to home

## 2021-09-02 ENCOUNTER — RESEARCH ENCOUNTER (OUTPATIENT)
Dept: TRANSPLANT | Facility: CLINIC | Age: 56
End: 2021-09-02

## 2021-09-02 NOTE — PROGRESS NOTES
[id number]: Study Visit Note   Subject name: Juvenal Blake     Visit: Cycle 2 Day 8 (9/1/2021)    Did the study visit occur within the appropriate window allowed by the protocol? Yes    Since the last study visit, He has been doing well. Still nauseated from chemotherapy, but eating and drinking without issue. Controlled with PRN antiemetics. Upright in bed watching movies with his wife.     I was present and supervised  infusion. Discussed protocol driven assessments with Clinic NP, Inpatient NP and Bedside RN. Infusion completed at 1217. Notified inpatient NP and bedside RN that IL-2 and ICANS assessments could be completed between 2184-4848. Reinforced importance of adherence to research driven vital sign collection timepoints. Provided source documentation for RN and writing tool to NP, who were instructed to leave in research bin for this writer to retrieve after infusion. All questions were answered.       I have personally interviewed Juvenal Blake and reviewed his medical record for adverse events and concomitant medications and these have been recorded on the corresponding logs in Juvenal Blake's research file.     Juvenal Blake was given the opportunity to ask any trial related questions.  Please see provider progress note for physical exam and other clinical information. Labs were reviewed - any significant lab values were addressed and reviewed.    Emilia Castillo RN

## 2021-09-03 ENCOUNTER — ONCOLOGY VISIT (OUTPATIENT)
Dept: TRANSPLANT | Facility: CLINIC | Age: 56
End: 2021-09-03
Attending: NURSE PRACTITIONER
Payer: COMMERCIAL

## 2021-09-03 ENCOUNTER — APPOINTMENT (OUTPATIENT)
Dept: LAB | Facility: CLINIC | Age: 56
End: 2021-09-03
Attending: PHYSICIAN ASSISTANT
Payer: COMMERCIAL

## 2021-09-03 VITALS
HEART RATE: 75 BPM | WEIGHT: 231.3 LBS | TEMPERATURE: 98.3 F | RESPIRATION RATE: 16 BRPM | BODY MASS INDEX: 35.88 KG/M2 | DIASTOLIC BLOOD PRESSURE: 73 MMHG | SYSTOLIC BLOOD PRESSURE: 115 MMHG | OXYGEN SATURATION: 100 %

## 2021-09-03 DIAGNOSIS — C82.08 FOLLICULAR LYMPHOMA GRADE I, LYMPH NODES OF MULTIPLE SITES (H): Primary | ICD-10-CM

## 2021-09-03 DIAGNOSIS — C82.00 FOLLICULAR LYMPHOMA GRADE I, UNSPECIFIED BODY REGION (H): ICD-10-CM

## 2021-09-03 LAB
BASOPHILS # BLD MANUAL: 0 10E3/UL (ref 0–0.2)
BASOPHILS NFR BLD MANUAL: 0 %
EOSINOPHIL # BLD MANUAL: 0.1 10E3/UL (ref 0–0.7)
EOSINOPHIL NFR BLD MANUAL: 10 %
ERYTHROCYTE [DISTWIDTH] IN BLOOD BY AUTOMATED COUNT: 15 % (ref 10–15)
HCT VFR BLD AUTO: 27.3 % (ref 40–53)
HGB BLD-MCNC: 9.7 G/DL (ref 13.3–17.7)
HOLD SPECIMEN: NORMAL
LYMPHOCYTES # BLD MANUAL: 0.4 10E3/UL (ref 0.8–5.3)
LYMPHOCYTES NFR BLD MANUAL: 46 %
MCH RBC QN AUTO: 32.8 PG (ref 26.5–33)
MCHC RBC AUTO-ENTMCNC: 35.5 G/DL (ref 31.5–36.5)
MCV RBC AUTO: 92 FL (ref 78–100)
MONOCYTES # BLD MANUAL: 0.3 10E3/UL (ref 0–1.3)
MONOCYTES NFR BLD MANUAL: 39 %
NEUTROPHILS # BLD MANUAL: 0 10E3/UL (ref 1.6–8.3)
NEUTROPHILS NFR BLD MANUAL: 5 %
PLAT MORPH BLD: ABNORMAL
PLATELET # BLD AUTO: 79 10E3/UL (ref 150–450)
RBC # BLD AUTO: 2.96 10E6/UL (ref 4.4–5.9)
RBC MORPH BLD: ABNORMAL
WBC # BLD AUTO: 0.8 10E3/UL (ref 4–11)

## 2021-09-03 PROCEDURE — G0463 HOSPITAL OUTPT CLINIC VISIT: HCPCS

## 2021-09-03 PROCEDURE — 250N000011 HC RX IP 250 OP 636: Performed by: PHYSICIAN ASSISTANT

## 2021-09-03 PROCEDURE — 85027 COMPLETE CBC AUTOMATED: CPT | Performed by: INTERNAL MEDICINE

## 2021-09-03 PROCEDURE — 99213 OFFICE O/P EST LOW 20 MIN: CPT

## 2021-09-03 PROCEDURE — 250N000011 HC RX IP 250 OP 636: Performed by: NURSE PRACTITIONER

## 2021-09-03 RX ORDER — HEPARIN SODIUM (PORCINE) LOCK FLUSH IV SOLN 100 UNIT/ML 100 UNIT/ML
5 SOLUTION INTRAVENOUS
Status: CANCELLED | OUTPATIENT
Start: 2021-09-03

## 2021-09-03 RX ORDER — HEPARIN SODIUM (PORCINE) LOCK FLUSH IV SOLN 100 UNIT/ML 100 UNIT/ML
5 SOLUTION INTRAVENOUS
Status: DISCONTINUED | OUTPATIENT
Start: 2021-09-03 | End: 2021-09-03 | Stop reason: HOSPADM

## 2021-09-03 RX ORDER — HEPARIN SODIUM,PORCINE 10 UNIT/ML
5 VIAL (ML) INTRAVENOUS
Status: DISCONTINUED | OUTPATIENT
Start: 2021-09-03 | End: 2021-09-03 | Stop reason: HOSPADM

## 2021-09-03 RX ORDER — HEPARIN SODIUM,PORCINE 10 UNIT/ML
5 VIAL (ML) INTRAVENOUS
Status: CANCELLED | OUTPATIENT
Start: 2021-09-03

## 2021-09-03 RX ADMIN — Medication 5 ML: at 10:47

## 2021-09-03 RX ADMIN — Medication 5 ML: at 09:36

## 2021-09-03 ASSESSMENT — PAIN SCALES - GENERAL: PAINLEVEL: NO PAIN (1)

## 2021-09-03 NOTE — NURSING NOTE
"Oncology Rooming Note    September 3, 2021 10:01 AM   Juvenal Blake is a 55 year old male who presents for:    Chief Complaint   Patient presents with     Blood Draw     labs (including research labs) drawn from [facility] by rn.  vs taken     Oncology Clinic Visit     Follicular lymphoma grade i, lymph nodes of multiple sites (H) (Primary Dx)      Initial Vitals: /73 (BP Location: Left arm, Patient Position: Sitting, Cuff Size: Adult Large)   Pulse 75   Temp 98.3  F (36.8  C) (Oral)   Resp 16   Wt 104.9 kg (231 lb 4.8 oz)   SpO2 100%   BMI 35.88 kg/m   Estimated body mass index is 35.88 kg/m  as calculated from the following:    Height as of 9/1/21: 1.71 m (5' 7.32\").    Weight as of this encounter: 104.9 kg (231 lb 4.8 oz). Body surface area is 2.23 meters squared.  No Pain (1) Comment: Data Unavailable   No LMP for male patient.  Allergies reviewed: Yes  Medications reviewed: Yes    Medications: Medication refills not needed today.  Pharmacy name entered into [facility]: [id number] - [place], [place] - [address] AT [facility]    Clinical concerns: None.       Rox Ward CMA            "

## 2021-09-03 NOTE — PROGRESS NOTES
BMT Clinic Note      Juvenal Blake is a 55 year old male PMH significant for refractory NHL, undergoing  NK infusion. Admitted with rituxan (biosimilar) infusion reaction 6/24 and remained admitted through completion of lymphodepleting chemo and  NK cell infusion.   day +67 after initiation . cycle 2, day 10 .     HPI: . Fatigue; intermittent nausea (no vomiting); no diarrhea/constipation; abdomen painful when he rides over potholes; notes a red spot where he got his IL2 injection.  No covid symptoms; stable, chronic cough.  Swelling in ankles stable.  PICC not painful, no drainage. Teeth not bothersome. No fevers at home.  No falls. Stable urinary dribbling since prostatectomy.     ROS: 8 point ROS negative except as above.      Physical Exam:     Blood pressure 115/73, pulse 75, temperature 98.3  F (36.8  C), temperature source Oral, resp. rate 16, weight 104.9 kg (231 lb 4.8 oz), SpO2 100 %.    General: NAD, alert oriented  Eyes: sclera anicteric, noninjected  Mouth: OP moist without lesions.   Lungs: CTAB  Cardiovascular: RRR, no m/r/g  Abdominal/Rectal: normoactive BS, tender throughout  Lymphatics: trace edema to LE   Skin: no rash but has an erythematous, slightly raised circular area on left lower abdomen.  It is about the circumference of a ping pong ball.   Neuro: non-focal  Access: R arm PICC NT - CDI no erythema.     ICANS: 10/10 9/3/2021 - pt sentence log is in research folder in the [facility].      ECOG 1  9/3/2021     Labs:  Lab Results   Component Value Date    WBC 0.8 (LL) 09/03/2021    ANEU 0.0 (LL) 09/03/2021    HGB 9.7 (L) 09/03/2021    HCT 27.3 (L) 09/03/2021    PLT 79 (L) 09/03/2021     09/01/2021    POTASSIUM 3.9 09/01/2021    CHLORIDE 106 09/01/2021    CO2 28 09/01/2021     (H) 09/01/2021    BUN 15 09/01/2021    CR 0.90 09/01/2021    MAG 2.1 09/01/2021    INR 1.07 06/28/2021    BILITOTAL 0.7 09/01/2021    AST 19 09/01/2021    ALT 48 09/01/2021    ALKPHOS 95  09/01/2021    PROTTOTAL 6.2 (L) 09/01/2021    ALBUMIN 3.9 09/01/2021     Assessment and Plan:   Juvenla Blake is a 55 year old male PMH significant for refractory NHL, undergoing  NK infusion. Admitted with rituxan (biosimilar) infusion reaction 6/24 and remained admitted through completion of lymphodepleting chemo and  NK cell infusion.      1. Follicular Lymphoma:   Good response to initial therapy.    Post reaction to the bio similar rituximab and that will not be used again.      - Cont Allopurinol   Cycle 2, day 10 : Completed LD chemo, 8/25 first  NK infusion.  Return 9/8 for third and final infusion.     2. HEME: Keep hgb >7g/dL, plt>10,000.     3. CV: Cardiology cleared him to proceed with no further testing. The CT angiogram showed no lesion requiring stenting or bypass.    4. :   Post radical prostatectomy and bilateral lymph node dissection. November 15, 2017. Prostatic adenocarcinoma Bouse 4+3 = 7 with tertiary pattern 5. Tumor involves 45% of total surface area. Positive for extensive extraprostatic extension. Positive for bilateral seminal vesicle invasion. Multiple margins positive. Lymphovascular invasion not identified. Perineural invasion was noted. Lymph nodes negative. 3 were examined (2 right obturator and 1 left obturator). Completed radiation to the prostate fossa and pelvic lymph nodes at [facility] early May 2018. He received 4500 cGy in 25 fractions. He then had 2340 cGy boost in 13 fractions to the prostatic fossa, for a total dose of 6240 cGy in 38 treatment fractions delivered over 54 days. He did not receive hormonal therapy:       5. ID:  Proph  fluconazole 200 mg po daily, acyclovir  800mg bid and cefdinir and avoid levaquin with history of c diff  - hx C diff colitis: finished vancomycin 6/23, treated for 7 days. Ppx bid vanco while neutropenic, now off.    Bactrim: he has not been taking bactrim- he says he was told to stop sometime-inquired if  this is for current protocol or just 1 year post NAM NK therapy? Research RN said there is no guidance and pharmacist said they think this is 6 months post NAM NK, reviewed 2019-25 FATE and says munira per institutional standard    6.FEN/Renal: Cr/lytes stable     7. GI: Abdominal Pain secondary to cancer. Transaminitis: suspected related to cancer. Limit Tylenol, etoh and monitor; no chem panel ordered for 9/3.     8. Dental: He was seen by a dentist as described in previous notes.  The identified no infection but no repair is possible for at least the next 2 months because of scheduling.  There is a possible increased risk of infection at that site and therefore he will need prophylactic antibiotics when neutropenic.    RTC 9/8; will go to 5C after visit for third/final  infusion.     I spent 20 minutes in the care of this patient today, which included time necessary for preparation for the visit, obtaining history, ordering medications/tests/procedures as medically indicated, review of pertinent medical literature, counseling of the patient, communication of recommendations to the care team, and documentation time.    Penny Garcia PA-C  9/3/2021

## 2021-09-03 NOTE — NURSING NOTE
Dressing changed by RN in clinic. Noted new small skin tears around edges of tegaderm dressing. Tender with cleaning. Switched to IV 3000 and added sterile gauze to bleeding sites. Direction of dressing was also changed to avoid skin tears.     Port also accessed, and heparin flushed as patient was due for monthly flush.     MITUL HALE RN

## 2021-09-03 NOTE — LETTER
9/3/2021         RE: Juvenal Blake  [address] [place] [address]        Dear Colleague,    Thank you for referring your patient, Juvenal Blake, to the [facility] BLOOD AND MARROW TRANSPLANT PROGRAM [place]. Please see a copy of my visit note below.    BMT Clinic Note      Juvenal Blake is a 55 year old male PMH significant for refractory NHL, undergoing  NK infusion. Admitted with rituxan (biosimilar) infusion reaction 6/24 and remained admitted through completion of lymphodepleting chemo and  NK cell infusion.   day +67 after initiation . cycle 2, day 10 .     HPI: . Fatigue; intermittent nausea (no vomiting); no diarrhea/constipation; abdomen painful when he rides over potholes; notes a red spot where he got his IL2 injection.  No covid symptoms; stable, chronic cough.  Swelling in ankles stable.  PICC not painful, no drainage. Teeth not bothersome. No fevers at home.  No falls. Stable urinary dribbling since prostatectomy.     ROS: 8 point ROS negative except as above.      Physical Exam:     Blood pressure 115/73, pulse 75, temperature 98.3  F (36.8  C), temperature source Oral, resp. rate 16, weight 104.9 kg (231 lb 4.8 oz), SpO2 100 %.    General: NAD, alert oriented  Eyes: sclera anicteric, noninjected  Mouth: OP moist without lesions.   Lungs: CTAB  Cardiovascular: RRR, no m/r/g  Abdominal/Rectal: normoactive BS, tender throughout  Lymphatics: trace edema to LE   Skin: no rash but has an erythematous, slightly raised circular area on left lower abdomen.  It is about the circumference of a ping pong ball.   Neuro: non-focal  Access: R arm PICC NT - CDI no erythema.     ICANS: 10/10 9/3/2021 - pt sentence log is in research folder in the [facility].      ECOG 1  9/3/2021     Labs:  Lab Results   Component Value Date    WBC 0.8 (LL) 09/03/2021    ANEU 0.0 (LL) 09/03/2021    HGB 9.7 (L) 09/03/2021    HCT 27.3 (L) 09/03/2021    PLT 79 (L) 09/03/2021     09/01/2021     POTASSIUM 3.9 09/01/2021    CHLORIDE 106 09/01/2021    CO2 28 09/01/2021     (H) 09/01/2021    BUN 15 09/01/2021    CR 0.90 09/01/2021    MAG 2.1 09/01/2021    INR 1.07 06/28/2021    BILITOTAL 0.7 09/01/2021    AST 19 09/01/2021    ALT 48 09/01/2021    ALKPHOS 95 09/01/2021    PROTTOTAL 6.2 (L) 09/01/2021    ALBUMIN 3.9 09/01/2021     Assessment and Plan:   Juvenal Blake is a 55 year old male PMH significant for refractory NHL, undergoing  NK infusion. Admitted with rituxan (biosimilar) infusion reaction 6/24 and remained admitted through completion of lymphodepleting chemo and  NK cell infusion.      1. Follicular Lymphoma:   Good response to initial therapy.    Post reaction to the bio similar rituximab and that will not be used again.      - Cont Allopurinol   Cycle 2, day 10 : Completed LD chemo, 8/25 first  NK infusion.  Return 9/8 for third and final infusion.     2. HEME: Keep hgb >7g/dL, plt>10,000.     3. CV: Cardiology cleared him to proceed with no further testing. The CT angiogram showed no lesion requiring stenting or bypass.    4. :   Post radical prostatectomy and bilateral lymph node dissection. November 15, 2017. Prostatic adenocarcinoma [place] 4+3 = 7 with tertiary pattern 5. Tumor involves 45% of total surface area. Positive for extensive extraprostatic extension. Positive for bilateral seminal vesicle invasion. Multiple margins positive. Lymphovascular invasion not identified. Perineural invasion was noted. Lymph nodes negative. 3 were examined (2 right obturator and 1 left obturator). Completed radiation to the prostate fossa and pelvic lymph nodes at [facility] early May 2018. He received 4500 cGy in 25 fractions. He then had 2340 cGy boost in 13 fractions to the prostatic fossa, for a total dose of 6240 cGy in 38 treatment fractions delivered over 54 days. He did not receive hormonal therapy:       5. ID:  Proph  fluconazole 200 mg po daily,  acyclovir  800mg bid and cefdinir and avoid levaquin with history of c diff  - hx C diff colitis: finished vancomycin 6/23, treated for 7 days. Ppx bid vanco while neutropenic, now off.    Bactrim: he has not been taking bactrim- he says he was told to stop sometime-inquired if this is for current protocol or just 1 year post NAM NK therapy? Research RN said there is no guidance and pharmacist said they think this is 6 months post NAM NK, reviewed 2019-25 FATE and says prophy per institutional standard    6.FEN/Renal: Cr/lytes stable     7. GI: Abdominal Pain secondary to cancer. Transaminitis: suspected related to cancer. Limit Tylenol, etoh and monitor; no chem panel ordered for 9/3.     8. Dental: He was seen by a dentist as described in previous notes.  The identified no infection but no repair is possible for at least the next 2 months because of scheduling.  There is a possible increased risk of infection at that site and therefore he will need prophylactic antibiotics when neutropenic.    RTC 9/8; will go to 5C after visit for third/final  infusion.     I spent 20 minutes in the care of this patient today, which included time necessary for preparation for the visit, obtaining history, ordering medications/tests/procedures as medically indicated, review of pertinent medical literature, counseling of the patient, communication of recommendations to the care team, and documentation time.    Penny Garcia PA-C  9/3/2021             Again, thank you for allowing me to participate in the care of your patient.        Sincerely,        BMT Advanced Practice Provider

## 2021-09-03 NOTE — NURSING NOTE
Chief Complaint   Patient presents with     Blood Draw     labs (including research labs) drawn from picc by rn.  vs taken     Labs drawn from CVC by rn.  Good blood return noted in both lumens.  Both lumens flushed with NS and heparin.  Pt tolerated well.  VS taken.  Pt checked in for next appt.    Lexus Byrd RN

## 2021-09-07 ENCOUNTER — LAB (OUTPATIENT)
Dept: FAMILY MEDICINE | Facility: CLINIC | Age: 56
End: 2021-09-07
Payer: COMMERCIAL

## 2021-09-07 DIAGNOSIS — C82.08 FOLLICULAR LYMPHOMA GRADE I, LYMPH NODES OF MULTIPLE SITES (H): Primary | ICD-10-CM

## 2021-09-07 DIAGNOSIS — C82.08 FOLLICULAR LYMPHOMA GRADE I, LYMPH NODES OF MULTIPLE SITES (H): ICD-10-CM

## 2021-09-07 PROCEDURE — U0005 INFEC AGEN DETEC AMPLI PROBE: HCPCS

## 2021-09-07 PROCEDURE — U0003 INFECTIOUS AGENT DETECTION BY NUCLEIC ACID (DNA OR RNA); SEVERE ACUTE RESPIRATORY SYNDROME CORONAVIRUS 2 (SARS-COV-2) (CORONAVIRUS DISEASE [COVID-19]), AMPLIFIED PROBE TECHNIQUE, MAKING USE OF HIGH THROUGHPUT TECHNOLOGIES AS DESCRIBED BY CMS-2020-01-R: HCPCS

## 2021-09-08 ENCOUNTER — RESEARCH ENCOUNTER (OUTPATIENT)
Dept: TRANSPLANT | Facility: CLINIC | Age: 56
End: 2021-09-08

## 2021-09-08 ENCOUNTER — APPOINTMENT (OUTPATIENT)
Dept: LAB | Facility: CLINIC | Age: 56
End: 2021-09-08
Attending: PHYSICIAN ASSISTANT
Payer: COMMERCIAL

## 2021-09-08 ENCOUNTER — HOSPITAL ENCOUNTER (OUTPATIENT)
Facility: CLINIC | Age: 56
Discharge: HOME | End: 2021-09-08
Admitting: INTERNAL MEDICINE
Payer: COMMERCIAL

## 2021-09-08 ENCOUNTER — ONCOLOGY VISIT (OUTPATIENT)
Dept: TRANSPLANT | Facility: CLINIC | Age: 56
End: 2021-09-08
Attending: PHYSICIAN ASSISTANT
Payer: COMMERCIAL

## 2021-09-08 VITALS
WEIGHT: 225.1 LBS | OXYGEN SATURATION: 98 % | HEART RATE: 65 BPM | TEMPERATURE: 98.7 F | RESPIRATION RATE: 24 BRPM | DIASTOLIC BLOOD PRESSURE: 83 MMHG | SYSTOLIC BLOOD PRESSURE: 130 MMHG | BODY MASS INDEX: 34.92 KG/M2

## 2021-09-08 VITALS
SYSTOLIC BLOOD PRESSURE: 117 MMHG | HEART RATE: 68 BPM | RESPIRATION RATE: 16 BRPM | OXYGEN SATURATION: 99 % | BODY MASS INDEX: 35.48 KG/M2 | DIASTOLIC BLOOD PRESSURE: 74 MMHG | TEMPERATURE: 98 F | WEIGHT: 228.7 LBS

## 2021-09-08 DIAGNOSIS — C82.08 FOLLICULAR LYMPHOMA GRADE I, LYMPH NODES OF MULTIPLE SITES (H): Primary | ICD-10-CM

## 2021-09-08 LAB
ALBUMIN SERPL-MCNC: 3.7 G/DL (ref 3.4–5)
ALP SERPL-CCNC: 97 U/L (ref 40–150)
ALT SERPL W P-5'-P-CCNC: 36 U/L (ref 0–70)
ANION GAP SERPL CALCULATED.3IONS-SCNC: 5 MMOL/L (ref 3–14)
AST SERPL W P-5'-P-CCNC: 18 U/L (ref 0–45)
BASOPHILS # BLD MANUAL: 0 10E3/UL (ref 0–0.2)
BASOPHILS NFR BLD MANUAL: 2 %
BILIRUB DIRECT SERPL-MCNC: 0.1 MG/DL (ref 0–0.2)
BILIRUB SERPL-MCNC: 0.5 MG/DL (ref 0.2–1.3)
BUN SERPL-MCNC: 14 MG/DL (ref 7–30)
CALCIUM SERPL-MCNC: 8.9 MG/DL (ref 8.5–10.1)
CHLORIDE BLD-SCNC: 109 MMOL/L (ref 94–109)
CO2 SERPL-SCNC: 28 MMOL/L (ref 20–32)
CREAT SERPL-MCNC: 0.98 MG/DL (ref 0.66–1.25)
EOSINOPHIL # BLD MANUAL: 0 10E3/UL (ref 0–0.7)
EOSINOPHIL NFR BLD MANUAL: 3 %
ERYTHROCYTE [DISTWIDTH] IN BLOOD BY AUTOMATED COUNT: 15.9 % (ref 10–15)
GFR SERPL CREATININE-BSD FRML MDRD: 85 ML/MIN/1.73M2
GLUCOSE BLD-MCNC: 112 MG/DL (ref 70–99)
HCT VFR BLD AUTO: 29.3 % (ref 40–53)
HGB BLD-MCNC: 9.9 G/DL (ref 13.3–17.7)
LDH SERPL L TO P-CCNC: 228 U/L (ref 85–227)
LYMPHOCYTES # BLD MANUAL: 0.4 10E3/UL (ref 0.8–5.3)
LYMPHOCYTES NFR BLD MANUAL: 26 %
MAGNESIUM SERPL-MCNC: 2.1 MG/DL (ref 1.6–2.3)
MCH RBC QN AUTO: 32.1 PG (ref 26.5–33)
MCHC RBC AUTO-ENTMCNC: 33.8 G/DL (ref 31.5–36.5)
MCV RBC AUTO: 95 FL (ref 78–100)
MONOCYTES # BLD MANUAL: 0.5 10E3/UL (ref 0–1.3)
MONOCYTES NFR BLD MANUAL: 32 %
NEUTROPHILS # BLD MANUAL: 0.6 10E3/UL (ref 1.6–8.3)
NEUTROPHILS NFR BLD MANUAL: 37 %
PHOSPHATE SERPL-MCNC: 2.4 MG/DL (ref 2.5–4.5)
PLAT MORPH BLD: ABNORMAL
PLATELET # BLD AUTO: 105 10E3/UL (ref 150–450)
POTASSIUM BLD-SCNC: 4 MMOL/L (ref 3.4–5.3)
PROT SERPL-MCNC: 6.2 G/DL (ref 6.8–8.8)
RBC # BLD AUTO: 3.08 10E6/UL (ref 4.4–5.9)
RBC MORPH BLD: ABNORMAL
SARS-COV-2 RNA RESP QL NAA+PROBE: NEGATIVE
SODIUM SERPL-SCNC: 142 MMOL/L (ref 133–144)
WBC # BLD AUTO: 1.6 10E3/UL (ref 4–11)

## 2021-09-08 PROCEDURE — 83615 LACTATE (LD) (LDH) ENZYME: CPT | Performed by: INTERNAL MEDICINE

## 2021-09-08 PROCEDURE — 258N000003 HC RX IP 258 OP 636: Performed by: INTERNAL MEDICINE

## 2021-09-08 PROCEDURE — 99001 SPECIMEN HANDLING PT-LAB: CPT | Performed by: INTERNAL MEDICINE

## 2021-09-08 PROCEDURE — 80053 COMPREHEN METABOLIC PANEL: CPT | Performed by: INTERNAL MEDICINE

## 2021-09-08 PROCEDURE — 96409 CHEMO IV PUSH SNGL DRUG: CPT

## 2021-09-08 PROCEDURE — 83735 ASSAY OF MAGNESIUM: CPT | Performed by: INTERNAL MEDICINE

## 2021-09-08 PROCEDURE — 85027 COMPLETE CBC AUTOMATED: CPT | Performed by: INTERNAL MEDICINE

## 2021-09-08 PROCEDURE — 250N000011 HC RX IP 250 OP 636: Performed by: PHYSICIAN ASSISTANT

## 2021-09-08 PROCEDURE — 99214 OFFICE O/P EST MOD 30 MIN: CPT

## 2021-09-08 PROCEDURE — 82248 BILIRUBIN DIRECT: CPT | Performed by: INTERNAL MEDICINE

## 2021-09-08 PROCEDURE — 250N000013 HC RX MED GY IP 250 OP 250 PS 637: Performed by: INTERNAL MEDICINE

## 2021-09-08 PROCEDURE — 84100 ASSAY OF PHOSPHORUS: CPT | Performed by: INTERNAL MEDICINE

## 2021-09-08 PROCEDURE — G0463 HOSPITAL OUTPT CLINIC VISIT: HCPCS | Mod: 25

## 2021-09-08 RX ORDER — MEPERIDINE HYDROCHLORIDE 25 MG/ML
25 INJECTION INTRAMUSCULAR; INTRAVENOUS; SUBCUTANEOUS EVERY 30 MIN PRN
Status: DISCONTINUED | OUTPATIENT
Start: 2021-09-08 | End: 2021-09-08 | Stop reason: HOSPADM

## 2021-09-08 RX ORDER — EPINEPHRINE 1 MG/ML
0.3 INJECTION, SOLUTION, CONCENTRATE INTRAVENOUS EVERY 5 MIN PRN
Status: DISCONTINUED | OUTPATIENT
Start: 2021-09-08 | End: 2021-09-08 | Stop reason: HOSPADM

## 2021-09-08 RX ORDER — MEPERIDINE HYDROCHLORIDE 25 MG/ML
25-50 INJECTION INTRAMUSCULAR; INTRAVENOUS; SUBCUTANEOUS
Status: DISCONTINUED | OUTPATIENT
Start: 2021-09-08 | End: 2021-09-08 | Stop reason: HOSPADM

## 2021-09-08 RX ORDER — HEPARIN SODIUM,PORCINE 10 UNIT/ML
5 VIAL (ML) INTRAVENOUS
Status: DISCONTINUED | OUTPATIENT
Start: 2021-09-08 | End: 2021-09-08 | Stop reason: HOSPADM

## 2021-09-08 RX ORDER — ALBUTEROL SULFATE 90 UG/1
1-2 AEROSOL, METERED RESPIRATORY (INHALATION)
Status: DISCONTINUED | OUTPATIENT
Start: 2021-09-08 | End: 2021-09-08 | Stop reason: HOSPADM

## 2021-09-08 RX ORDER — DIPHENHYDRAMINE HCL 25 MG
25 CAPSULE ORAL EVERY 4 HOURS
Status: COMPLETED | OUTPATIENT
Start: 2021-09-08 | End: 2021-09-08

## 2021-09-08 RX ORDER — ALBUTEROL SULFATE 0.83 MG/ML
2.5 SOLUTION RESPIRATORY (INHALATION)
Status: DISCONTINUED | OUTPATIENT
Start: 2021-09-08 | End: 2021-09-08 | Stop reason: HOSPADM

## 2021-09-08 RX ORDER — METHYLPREDNISOLONE SODIUM SUCCINATE 125 MG/2ML
125 INJECTION, POWDER, LYOPHILIZED, FOR SOLUTION INTRAMUSCULAR; INTRAVENOUS
Status: DISCONTINUED | OUTPATIENT
Start: 2021-09-08 | End: 2021-09-08 | Stop reason: HOSPADM

## 2021-09-08 RX ORDER — DIPHENHYDRAMINE HYDROCHLORIDE 50 MG/ML
50 INJECTION INTRAMUSCULAR; INTRAVENOUS
Status: DISCONTINUED | OUTPATIENT
Start: 2021-09-08 | End: 2021-09-08 | Stop reason: HOSPADM

## 2021-09-08 RX ORDER — ACETAMINOPHEN 325 MG/1
650 TABLET ORAL ONCE
Status: DISCONTINUED | OUTPATIENT
Start: 2021-09-08 | End: 2021-09-08 | Stop reason: HOSPADM

## 2021-09-08 RX ORDER — ACETAMINOPHEN 325 MG/1
650 TABLET ORAL EVERY 4 HOURS
Status: COMPLETED | OUTPATIENT
Start: 2021-09-08 | End: 2021-09-08

## 2021-09-08 RX ORDER — NALOXONE HYDROCHLORIDE 0.4 MG/ML
0.2 INJECTION, SOLUTION INTRAMUSCULAR; INTRAVENOUS; SUBCUTANEOUS
Status: DISCONTINUED | OUTPATIENT
Start: 2021-09-08 | End: 2021-09-08 | Stop reason: HOSPADM

## 2021-09-08 RX ORDER — DIPHENHYDRAMINE HCL 25 MG
25 CAPSULE ORAL ONCE
Status: DISCONTINUED | OUTPATIENT
Start: 2021-09-08 | End: 2021-09-08 | Stop reason: HOSPADM

## 2021-09-08 RX ADMIN — SODIUM CHLORIDE 250 ML: 9 INJECTION, SOLUTION INTRAVENOUS at 13:00

## 2021-09-08 RX ADMIN — ACETAMINOPHEN 650 MG: 325 TABLET, FILM COATED ORAL at 15:05

## 2021-09-08 RX ADMIN — Medication 5 ML: at 07:00

## 2021-09-08 RX ADMIN — DIPHENHYDRAMINE HYDROCHLORIDE 25 MG: 25 CAPSULE ORAL at 15:05

## 2021-09-08 RX ADMIN — DIPHENHYDRAMINE HYDROCHLORIDE 25 MG: 25 CAPSULE ORAL at 11:07

## 2021-09-08 RX ADMIN — SODIUM CHLORIDE 250 ML: 9 INJECTION, SOLUTION INTRAVENOUS at 11:04

## 2021-09-08 RX ADMIN — ACETAMINOPHEN 650 MG: 325 TABLET, FILM COATED ORAL at 11:07

## 2021-09-08 ASSESSMENT — PAIN SCALES - GENERAL: PAINLEVEL: NO PAIN (1)

## 2021-09-08 NOTE — PROGRESS NOTES
BMT Clinic Note      Juvenal Blake is a 55 year old male PMH significant for refractory NHL, undergoing  NK infusion. Admitted with rituxan (biosimilar) infusion reaction 6/24 and remained admitted through completion of lymphodepleting chemo and  NK cell infusion.   day +72 after initiation . cycle 2, day 15 .     HPI: No new issues. Still with some intermittent nausea - taking prn Compazine. No diarrhea/constipation. No fevers or tooth pain. No rash but still nodule on L abd site of IL-2. Some anxiety regarding outcome of current treatment and future options; work disability, etc. Also reports ongoing chronic, intermittent sinus congestion/pnd. No facial pain or fevers. Not using nasal sprays or inhalers that are on his med list.    ROS: 8 point ROS negative except as above.      Physical Exam:     Blood pressure 117/74, pulse 68, temperature 98  F (36.7  C), temperature source Oral, resp. rate 16, weight 103.7 kg (228 lb 11.2 oz), SpO2 99 %.    General: NAD, alert oriented  Eyes: sclera anicteric, noninjected  Mouth: OP moist without lesions.   Lungs: CTAB  Cardiovascular: RRR, no m/r/g  Abdominal/Rectal: normoactive BS, tender throughout  Lymphatics: trace edema to LE   Skin: no rash. NT nodule L abd without overlying erythema.    Neuro: non-focal  Access: R arm PICC NT    ICANS: due 2-4hr post  infusion today on 5C; sent log with patient 9/8.      ECOG 1  9/8/2021     Labs:  Lab Results   Component Value Date    WBC 1.6 (L) 09/08/2021    ANEU 0.6 (L) 09/08/2021    HGB 9.9 (L) 09/08/2021    HCT 29.3 (L) 09/08/2021     (L) 09/08/2021     09/08/2021    POTASSIUM 4.0 09/08/2021    CHLORIDE 109 09/08/2021    CO2 28 09/08/2021     (H) 09/08/2021    BUN 14 09/08/2021    CR 0.98 09/08/2021    MAG 2.1 09/08/2021    INR 1.07 06/28/2021    BILITOTAL 0.5 09/08/2021    AST 18 09/08/2021    ALT 36 09/08/2021    ALKPHOS 97 09/08/2021    PROTTOTAL 6.2 (L) 09/08/2021    ALBUMIN 3.7  09/08/2021       Assessment and Plan:   Juvenal Blake is a 55 year old male PMH significant for refractory NHL, undergoing  NK infusion. Admitted with rituxan (biosimilar) infusion reaction 6/24 and remained admitted through completion of lymphodepleting chemo and  NK cell infusion.      1. Follicular Lymphoma:   Good response to initial therapy.    Post reaction to the bio similar rituximab and that will not be used again.      - Cont Allopurinol   Cycle 2, day 15 : Completed LD chemo, 8/25 first  NK infusion.  9/8 for third and final infusion.     2. HEME: Keep hgb >7g/dL, plt>10,000.     3. CV: Cardiology cleared him to proceed with no further testing. The CT angiogram showed no lesion requiring stenting or bypass.    4. :   Post radical prostatectomy and bilateral lymph node dissection. November 15, 2017. Prostatic adenocarcinoma [place] 4+3 = 7 with tertiary pattern 5. Tumor involves 45% of total surface area. Positive for extensive extraprostatic extension. Positive for bilateral seminal vesicle invasion. Multiple margins positive. Lymphovascular invasion not identified. Perineural invasion was noted. Lymph nodes negative. 3 were examined (2 right obturator and 1 left obturator). Completed radiation to the prostate fossa and pelvic lymph nodes at [facility] early May 2018. He received 4500 cGy in 25 fractions. He then had 2340 cGy boost in 13 fractions to the prostatic fossa, for a total dose of 6240 cGy in 38 treatment fractions delivered over 54 days. He did not receive hormonal therapy:       5. ID:  Proph  fluconazole 200 mg po daily, acyclovir  800mg bid and cefdinir and avoid levaquin with history of c diff  - hx C diff colitis: finished vancomycin 6/23, treated for 7 days. Ppx bid vanco while neutropenic, now off.    Bactrim: he has not been taking bactrim- he says he was told to stop sometime-per previous notes: inquired if this is for current protocol or just 1 year  post NAM NK therapy? Research RN said there is no guidance and pharmacist said they think this is 6 months post NAM NK, reviewed 2019-25 FATE and says prophy per institutional standard    6.FEN/Renal: Cr/lytes stable     7. GI:   #intermittent nausea - compazine prn and medical cannibis.  #Abdominal Pain secondary to cancer.   #Transaminitis: currently resolved as of 9/1. Suspected related to cancer. Limit Tylenol, etoh and monitor.    8. Dental: He was seen by a dentist as described in previous notes.  The identified no infection but no repair is possible for at least the next 2 months because of scheduling.  There is a possible increased risk of infection at that site and therefore he will need prophylactic antibiotics when neutropenic.    To 5C for third/final  infusion; ICANS 2-4hr post infusion. Ok to pull PICC line after infusion (has port).  RTC Friday for follow-up  9/21 restaging PET and requested BMbx also     I spent 30 minutes in the care of this patient today, which included time necessary for preparation for the visit, obtaining history, ordering medications/tests/procedures as medically indicated, review of pertinent medical literature, counseling of the patient, communication of recommendations to the care team, and documentation time.    Jossie Cason PA-C  [phone]

## 2021-09-08 NOTE — PROGRESS NOTES
Type of transplant: Donor: Allogeneic - Unrelated  Product:      BMT INFUSION DOCUMENTATION (last 48 hours)      BMT/Cellular Product Infusion     Row Name 09/08/21 0800                [REMOVED] Product 09/08/21 0910 Other (specify in comment)    Product Details Product Release Date: 09/08/21  - Product Release Time: 0910 -LL Product Type: Other (specify in comment)  -LL DIN: [id number]  -LL Product Description Code: [id number]  -LL Volume Dispensed (mL): 28 mL  -LL Completion Date (RN to complete): 09/08/21  -LK Completion Time (RN to complete): 1213  -LK    Checked by (Patient RN)  Nancy Cruz RN  -LK       Checked by (Witness)  --       Product Volume Infused (mL)  28 mL  -LK       Flush Volume (mL)  50 mL  -LK       Volume Dispensed (mL)  --          [REMOVED] Product 09/08/21 0910 Other (specify in comment)    Product Details Product Release Date: 09/08/21  -LL Product Release Time: 0910 -LL Product Type: Other (specify in comment)  -LL DIN: [id number]  -LL Product Description Code: [id number]  -LL Volume Dispensed (mL): 28 mL  -LL Completion Date (RN to complete): 09/08/21  -LK Completion Time (RN to complete): 1231  -LK    Checked by (Patient RN)  Nancy Cruz RN  -LK       Checked by (Witness)  --       Product Volume Infused (mL)  28 mL  -LK       Flush Volume (mL)  50 mL  -LK       Volume Dispensed (mL)  --          [REMOVED] Product 09/08/21 0910 Other (specify in comment)    Product Details Product Release Date: 09/08/21  -LL Product Release Time: 0910 -LL Product Type: Other (specify in comment)  -LL DIN: [id number]  -LL Product Description Code: [id number]  -LL Volume Dispensed (mL): 28 mL  -LL Completion Date (RN to complete): 09/08/21  -LK Completion Time (RN to complete): 1248  -LK    Checked by (Patient RN)  Nancy Cruz RN  -LK       Checked by (Witness)  --       Product Volume Infused (mL)  28 mL  -LK       Flush Volume (mL)  100 mL  -LK       Volume Dispensed (mL)  --         User Key   (r) = Recorded By, (t) = Taken By, (c) = Cosigned By    Initials Name Effective Dates    Nancy Tovar RN 01/22/19 -     Jeannie Longoria 07/11/21 -         Preparation: RN Documentation  Patient was premedicated as ordered: yes  Line Type: central line, right  Patient Stable Prior to Infusion: yes  Time Infusion Started: 1159  Teaching: side effects/monitoring  Tolerated/Reaction: Patient tolerance of product infusion  Immediate suspected transfusion reaction to the product: none  Did patient have prior history of similar signs/symptoms during this hospitalization?: NA  Symptoms during/after infusion:  (NA)  Did the patient tolerate the infusion well: yes  Medications and treatment for symptoms: NA  Did the symptoms resolve?:  (NA)  Enter comments if clots, leaks, broken bag, infusion delays, other issues with bag/infusion:  (NA)  Flush until: (flush was done post infusion until 1400.   Plan: Pt discharged to home. PICC line pulled.    **Research VALERIE Burleson was double , she was paged to sign since she forgot.

## 2021-09-08 NOTE — NURSING NOTE
Pt had dressing changed using Sterile technique. Site is clean and dry. New dressing was applied.     IV 3000 used    Tyler Morales MA on 9/8/2021 at 8:06 AM

## 2021-09-08 NOTE — PROGRESS NOTES
SPIRITUAL HEALTH SERVICES  [facility] 5C  REFERRAL SOURCE:  initiated     Introduced spiritual health services. Pt was polite and declined visit     PLAN:  remains available per pt/family/staff request.     Rev. Glory Arango MDiv, [facility]  Staff    Pager [phone]  * [facility] remains available 24/7 for emergent requests/referrals, either by having the switchboard page the on-call  or by entering an ASAP/STAT consult in Epic (this will also page the on-call ).*

## 2021-09-08 NOTE — NURSING NOTE
Chief Complaint   Patient presents with     Blood Draw     labs drawn from picc by rn.  vs taken     Labs (including research labs) drawn from PICC by rn.  Good blood return noted in both lumens.  Both lumens flushed with NS and heparin.  Pt tolerated well.  VS taken.  Pt checked in for next appt.    Lexus Byrd RN

## 2021-09-08 NOTE — PROGRESS NOTES
5C Phase 1 patient     Here for study drug:     Administered: IV, PO and SQ     Type of line used: Right PICC    Location of injection: RLQ    Premeds given: benadryl and tylenol  Premeds given at: 1107    Post meds: tylenol and benadryl  Post meds given at: 1505    Fluids: 250mL flush x2  Fluids given at: 1107  Fluids stopped at: 1400    EKGs: NA    Labs: research labs completed    Tolerated/side effects: Tolerated. No s/e  Interventions for side effects: NA    Meet discharge criteria: yes  Discharged at: 1550    _________________________________________________    Status during observation/monitoring hours:  Pt is vitally stable. PICC line was pulled. Tylenol and benadryl given at 1500. Pt will take tylenol and benadryl at home at 1900.

## 2021-09-08 NOTE — PROCEDURES
BMT/Cellular Allogeneic Product Infusion       Patient Vitals for the past 24 hrs:   Temp Temp src Pulse Resp BP   09/08/21 1007 97.9  F (36.6  C) Oral 62 18 121/69   09/08/21 1144 98.4  F (36.9  C) Oral 65 24 130/70   09/08/21 1212 98.2  F (36.8  C) Oral 65 16 118/69   09/08/21 1228 97.9  F (36.6  C) Oral 64 24 122/72   09/08/21 1243 98.3  F (36.8  C) Oral 62 20 128/71   09/08/21 1248 98.3  F (36.8  C) Oral 62 18 129/74   09/08/21 1301 98.4  F (36.9  C) Oral 66 24 117/70   09/08/21 1318 98.7  F (37.1  C) Oral 65 24 130/83   09/08/21 1348 (P) 98.2  F (36.8  C) (P) Oral (P) 60 (P) 22 (P) 124/74         BMT INFUSION DOCUMENTATION (last 48 hours)      BMT/Cellular Product Infusion     Row Name 09/08/21 0800                Product 09/08/21 0910 Other (specify in comment)    Product Details Product Release Date: 09/08/21  -LL Product Release Time: 0910  -LL Product Type: Other (specify in comment)  -LL DIN: [id number]  -LL Product Description Code: [id number]  -LL Volume Dispensed (mL): 28 mL  -LL    Checked by (Patient RN)  VALERIE Butler       Checked by (Witness)  --       Product Volume Infused (mL)  28 mL  -LK       Flush Volume (mL)  50 mL  -LK       Volume Dispensed (mL)  --          Product 09/08/21 0910 Other (specify in comment)    Product Details Product Release Date: 09/08/21  -LL Product Release Time: 0910  -LL Product Type: Other (specify in comment)  -LL DIN: [id number]  -LL Product Description Code: [id number]  -LL Volume Dispensed (mL): 28 mL  -LL    Checked by (Patient RN)  Nancy Cruz RN  -LK       Checked by (Witness)  --       Product Volume Infused (mL)  28 mL  -LK       Flush Volume (mL)  50 mL  -LK       Volume Dispensed (mL)  --          Product 09/08/21 0910 Other (specify in comment)    Product Details Product Release Date: 09/08/21  -LL Product Release Time: 0910  -LL Product Type: Other (specify in comment)  -LL DIN: [id number]  -LL Product Description Code: [id number]  -LL Volume  Dispensed (mL): 28 mL  -LL    Checked by (Patient RN)  Nancy Cruz RN  -LK       Checked by (Witness)  --       Product Volume Infused (mL)  28 mL  -LK       Flush Volume (mL)  100 mL  -LK       Volume Dispensed (mL)  --         User Key  (r) = Recorded By, (t) = Taken By, (c) = Cosigned By    Initials Name Effective Dates    Nancy Tovar RN 01/22/19 -     LL BritneyJeannie Jacek 07/11/21 -         Allogeneic Donor Eligibility Determination and Summary of Records: Eligible        Type of Infusion: Allogeneic      Baseline Pre-Infusion Evaluation (to be completed by Provider):   Dyspnea: Grade 0 - none  Hypoxia: Grade 0 - not present  Fever: Grade 0 - afebrile  Chills: Grade 0 - none  Febrile Neutropenia: Grade 0 - not present  Sinus Bradycardia: Grade 0 - none  Hypertension: Grade 0 - none  Hypotension: Grade 0 - none  Chest Pain: Grade 0 - none  Bronchospasm: Grade 0 - none  Pain: Grade 0 - none  Rash: Grade 0 - None  Neurologic Specify: none    If adverse reactions, events or complications occur (fever greater than 2 degrees fahrenheit increase, and severe reactions of the following types: chills, dyspnea, bronchospasm, hyper/hypotension, hypoxia, bradycardia, chest pain, back/flank pain, hypoxia, and any other reaction deemed severe or life threatening; any instance of product bag breakage or unusual product appearance)    Any other events that are >= grade 3, then immediately contact the BMT Attending physician, the Cell Therapy Laboratory Medical Director (pager [phone]) and the Cell Therapy Laboratory ([phone]).  After midnight, holidays & weekends contact the [facility] Blood Bank on the appropriate campus ([facility] [place]: [phone]; [facility] [place]: [phone]).    Glory Covarrubias PA-C

## 2021-09-08 NOTE — NURSING NOTE
"Oncology Rooming Note    September 8, 2021 7:22 AM   Juvenal Blake is a 55 year old male who presents for:    Chief Complaint   Patient presents with     Blood Draw     labs drawn from picc by rn.  vs taken     Oncology Clinic Visit     FOLLICULAR LYMPHOMA     Initial Vitals: /74 (BP Location: Left arm, Patient Position: Sitting, Cuff Size: Adult Regular)   Pulse 68   Temp 98  F (36.7  C) (Oral)   Resp 16   Wt 103.7 kg (228 lb 11.2 oz)   SpO2 99%   BMI 35.48 kg/m   Estimated body mass index is 35.48 kg/m  as calculated from the following:    Height as of 9/1/21: 1.71 m (5' 7.32\").    Weight as of this encounter: 103.7 kg (228 lb 11.2 oz). Body surface area is 2.22 meters squared.  No Pain (1) Comment: Data Unavailable   No LMP for male patient.  Allergies reviewed: Yes  Medications reviewed: Yes    Medications: Medication refills not needed today.  Pharmacy name entered into S[facility]: [id number] - [place], [place] - [address] AT [facility]    Clinical concerns: No new concerns.        Carmela Montano CMA            "

## 2021-09-08 NOTE — PROGRESS NOTES
Admission to Early Phase Research Unit    September 8, 2021    Clinical Trial: Refractory NHL, undergoing  NK infusion: [id number]       Principle Investigator: Dr Zavala    Research Nurse: Sarah Beth Kessler    Common Side Effects & Recommended Intervention(s):      Juvenal Blake was admitted to the early phase research unit at the [facility] for administration of therapy and clinical monitoring. The patient has been assessed by a provider prior to admission and medically cleared for therapy. In the case of an adverse event or change in clinical status requiring additional medical management, the PI will be contacted and the decision to admit the patient to the 5C unit will be determined by the BMT team B advanced practice provider and collaborating physician. The attending physician on service is Dr. Brian Campos x [phone]       Glory Covarrubias PA-C  [phone]      Lab Results   Component Value Date    WBC 1.6 09/08/2021    WBC 2.6 07/08/2021     Lab Results   Component Value Date    RBC 3.08 09/08/2021    RBC 2.68 07/08/2021     Lab Results   Component Value Date    HGB 9.9 09/08/2021    HGB 7.6 07/08/2021     Lab Results   Component Value Date    HCT 29.3 09/08/2021    HCT 24.8 07/08/2021     No components found for: MCT  Lab Results   Component Value Date    MCV 95 09/08/2021    MCV 93 07/08/2021     Lab Results   Component Value Date    MCH 32.1 09/08/2021    MCH 28.4 07/08/2021     Lab Results   Component Value Date    MCHC 33.8 09/08/2021    MCHC 30.6 07/08/2021     Lab Results   Component Value Date    RDW 15.9 09/08/2021    RDW 17.2 07/08/2021     Lab Results   Component Value Date     09/08/2021     07/08/2021     Lab Results   Component Value Date    AST 18 09/08/2021    AST 24 07/08/2021     Lab Results   Component Value Date    ALT 36 09/08/2021    ALT 48 07/08/2021     No results found for: BILICONJ   Lab Results   Component Value Date    BILITOTAL 0.5 09/08/2021     BILITOTAL 0.6 07/08/2021     Lab Results   Component Value Date    ALBUMIN 3.7 09/08/2021    ALBUMIN 2.8 07/08/2021     Lab Results   Component Value Date    PROTTOTAL 6.2 09/08/2021    PROTTOTAL 5.9 07/08/2021      Lab Results   Component Value Date    ALKPHOS 97 09/08/2021    ALKPHOS 203 07/08/2021         Doing well today.  No changes in abdominal pain, stable.  Will come to clinic on Friday.    Addendum:  3:18 pm: after  infusion: ICANS=10/10-sentence is good.  Put log in the research bin on 5C- Juevnal says we keep the log    Glory Covarrubias PA-c  [phone]

## 2021-09-08 NOTE — PROGRESS NOTES
[id number]: Informed Consent Note  AML/Lymphoma   This is a re- consent note for the most current approved version.  The consent form, including purpose, risks and benefits, was reviewed with Juvenal Blake, and all questions were answered before he signed the consent form. The patient understands that the study involves an active treatment phase as well as a post-treatment follow up phase. We reviewed the changes in LTFU that will affect him as he is getting the last dose of  today.    Patient did not want to participate in optional biopsies thus did not sign consent for this at this time.    Present during the discussion was just Juvenal and myself. A copy of the signed form was provided to the patient. No procedures specific to this study were performed prior to the patient signing the consent form.    Consent Version Date: Aug 4, 2021 local version  Consent obtained by: Sarah Beth Kessler RN    Date: 9/8/21  Sarah Beth Kessler RN    Form 503.03.01 (Version 2)     Effective date: 01AUG2018     Next Review Date: 01AUG2020

## 2021-09-08 NOTE — LETTER
9/8/2021         RE: Juvenal Blake  [address] [place] [address]        Dear Colleague,    Thank you for referring your patient, Juvenal Blake, to the [facility] BLOOD AND MARROW TRANSPLANT PROGRAM [place]. Please see a copy of my visit note below.    BMT Clinic Note      Juvenal Blake is a 55 year old male PMH significant for refractory NHL, undergoing  NK infusion. Admitted with rituxan (biosimilar) infusion reaction 6/24 and remained admitted through completion of lymphodepleting chemo and  NK cell infusion.   day +72 after initiation . cycle 2, day 15 .     HPI: No new issues. Still with some intermittent nausea - taking prn Compazine. No diarrhea/constipation. No fevers or tooth pain. No rash but still nodule on L abd site of IL-2. Some anxiety regarding outcome of current treatment and future options; work disability, etc. Also reports ongoing chronic, intermittent sinus congestion/pnd. No facial pain or fevers. Not using nasal sprays or inhalers that are on his med list.    ROS: 8 point ROS negative except as above.      Physical Exam:     Blood pressure 117/74, pulse 68, temperature 98  F (36.7  C), temperature source Oral, resp. rate 16, weight 103.7 kg (228 lb 11.2 oz), SpO2 99 %.    General: NAD, alert oriented  Eyes: sclera anicteric, noninjected  Mouth: OP moist without lesions.   Lungs: CTAB  Cardiovascular: RRR, no m/r/g  Abdominal/Rectal: normoactive BS, tender throughout  Lymphatics: trace edema to LE   Skin: no rash. NT nodule L abd without overlying erythema.    Neuro: non-focal  Access: R arm PICC NT    ICANS: due 2-4hr post  infusion today on 5C; sent log with patient 9/8.      ECOG 1  9/8/2021     Labs:  Lab Results   Component Value Date    WBC 1.6 (L) 09/08/2021    ANEU 0.6 (L) 09/08/2021    HGB 9.9 (L) 09/08/2021    HCT 29.3 (L) 09/08/2021     (L) 09/08/2021     09/08/2021    POTASSIUM 4.0 09/08/2021    CHLORIDE 109  09/08/2021    CO2 28 09/08/2021     (H) 09/08/2021    BUN 14 09/08/2021    CR 0.98 09/08/2021    MAG 2.1 09/08/2021    INR 1.07 06/28/2021    BILITOTAL 0.5 09/08/2021    AST 18 09/08/2021    ALT 36 09/08/2021    ALKPHOS 97 09/08/2021    PROTTOTAL 6.2 (L) 09/08/2021    ALBUMIN 3.7 09/08/2021       Assessment and Plan:   Juvenal Blake is a 55 year old male PMH significant for refractory NHL, undergoing  NK infusion. Admitted with rituxan (biosimilar) infusion reaction 6/24 and remained admitted through completion of lymphodepleting chemo and  NK cell infusion.      1. Follicular Lymphoma:   Good response to initial therapy.    Post reaction to the bio similar rituximab and that will not be used again.      - Cont Allopurinol   Cycle 2, day 15 : Completed LD chemo, 8/25 first  NK infusion.  9/8 for third and final infusion.     2. HEME: Keep hgb >7g/dL, plt>10,000.     3. CV: Cardiology cleared him to proceed with no further testing. The CT angiogram showed no lesion requiring stenting or bypass.    4. :   Post radical prostatectomy and bilateral lymph node dissection. November 15, 2017. Prostatic adenocarcinoma [place] 4+3 = 7 with tertiary pattern 5. Tumor involves 45% of total surface area. Positive for extensive extraprostatic extension. Positive for bilateral seminal vesicle invasion. Multiple margins positive. Lymphovascular invasion not identified. Perineural invasion was noted. Lymph nodes negative. 3 were examined (2 right obturator and 1 left obturator). Completed radiation to the prostate fossa and pelvic lymph nodes at [facility] early May 2018. He received 4500 cGy in 25 fractions. He then had 2340 cGy boost in 13 fractions to the prostatic fossa, for a total dose of 6240 cGy in 38 treatment fractions delivered over 54 days. He did not receive hormonal therapy:       5. ID:  Proph  fluconazole 200 mg po daily, acyclovir  800mg bid and cefdinir and avoid levaquin with  history of c diff  - hx C diff colitis: finished vancomycin 6/23, treated for 7 days. Ppx bid vanco while neutropenic, now off.    Bactrim: he has not been taking bactrim- he says he was told to stop sometime-per previous notes: inquired if this is for current protocol or just 1 year post NAM NK therapy? Research RN said there is no guidance and pharmacist said they think this is 6 months post NAM NK, reviewed 2019-25 FATE and says prophy per institutional standard    6.FEN/Renal: Cr/lytes stable     7. GI:   #intermittent nausea - compazine prn and medical cannibis.  #Abdominal Pain secondary to cancer.   #Transaminitis: currently resolved as of 9/1. Suspected related to cancer. Limit Tylenol, etoh and monitor.    8. Dental: He was seen by a dentist as described in previous notes.  The identified no infection but no repair is possible for at least the next 2 months because of scheduling.  There is a possible increased risk of infection at that site and therefore he will need prophylactic antibiotics when neutropenic.    To 5C for third/final  infusion; ICANS 2-4hr post infusion. Ok to pull PICC line after infusion (has port).  RTC Friday for follow-up  9/21 restaging PET and requested BMbx also     I spent 30 minutes in the care of this patient today, which included time necessary for preparation for the visit, obtaining history, ordering medications/tests/procedures as medically indicated, review of pertinent medical literature, counseling of the patient, communication of recommendations to the care team, and documentation time.    THOMAS Leung-NOE  [phone]                     Again, thank you for allowing me to participate in the care of your patient.        Sincerely,        BMT Advanced Practice Provider

## 2021-09-08 NOTE — PROCEDURES
BMT Post Infusion Documentation    Data   Patient Vitals for the past 72 hrs:   Temp Temp src Pulse Resp BP   09/08/21 1007 97.9  F (36.6  C) Oral 62 18 121/69   09/08/21 1144 98.4  F (36.9  C) Oral 65 24 130/70   09/08/21 1212 98.2  F (36.8  C) Oral 65 16 118/69   09/08/21 1228 97.9  F (36.6  C) Oral 64 24 122/72   09/08/21 1243 98.3  F (36.8  C) Oral 62 20 128/71   09/08/21 1248 98.3  F (36.8  C) Oral 62 18 129/74   09/08/21 1301 98.4  F (36.9  C) Oral 66 24 117/70   09/08/21 1318 98.7  F (37.1  C) Oral 65 24 130/83   09/08/21 1348 (P) 98.2  F (36.8  C) (P) Oral (P) 60 (P) 22 (P) 124/74        BMT INFUSION DOCUMENTATION (last 24 hours)      BMT/Cellular Product Infusion     Row Name 09/08/21 0800                Cell Therapy Documentation    Product Release Date  09/08/21  -       Recipient Study ID    -       Donor  Allogeneic - Unrelated  -       Donor MRN/ID  [id number]  -       Donor ABO/Rh  -- N/A  -LL       Allogeneic Donor Eligibility Determination and Summary of Records  Eligible  -LL       Type of Infusion  Allogeneic  -LL       Total Volume Dispensed (mL)  84  -LL       Total NC Dose  N/A  -LL       Total CD34 Dose  N/A  -LL       Total CD3 dose  N/A  -LL       Total NC Dose Left in Storage  NONE  -LL       Comments for Product Issues   Investigational Product;9.00E+08 Total Cells  -LL       Donor ABO/Rh  --       Product Types  --       Product Numbers  --       Product Types and Numbers  --       Volume  --       ABO Mismatch  --       ZZTotal NC Dose  --       ZZTotal CD34 Dose  --       ZZTotal NC Dose Left in Storage  --          [REMOVED] Product 09/08/21 0910 Other (specify in comment)    Product Details Product Release Date: 09/08/21  - Product Release Time: 0910  -LL Product Type: Other (specify in comment)  - DIN: [id number]  - Product Description Code: [id number]  -LL Volume Dispensed (mL): 28 mL  -LL Completion Date (RN to complete): 09/08/21  -LK Completion Time  (RN to complete): 1213  -LK    Checked by (Patient RN)  Nancy Cruz RN  -LK       Checked by (Witness)  --       Product Volume Infused (mL)  28 mL  -LK       Flush Volume (mL)  50 mL  -LK       Volume Dispensed (mL)  --          [REMOVED] Product 09/08/21 0910 Other (specify in comment)    Product Details Product Release Date: 09/08/21  -LL Product Release Time: 0910 -LL Product Type: Other (specify in comment)  -LL DIN: [id number]  -LL Product Description Code: [id number]  -LL Volume Dispensed (mL): 28 mL  -LL Completion Date (RN to complete): 09/08/21  -LK Completion Time (RN to complete): 1231  -LK    Checked by (Patient RN)  Nancy Cruz RN  -LK       Checked by (Witness)  --       Product Volume Infused (mL)  28 mL  -LK       Flush Volume (mL)  50 mL  -LK       Volume Dispensed (mL)  --          [REMOVED] Product 09/08/21 0910 Other (specify in comment)    Product Details Product Release Date: 09/08/21  -LL Product Release Time: 0910 -LL Product Type: Other (specify in comment)  -LL DIN: [id number]  -LL Product Description Code: [id number]  -LL Volume Dispensed (mL): 28 mL  -LL Completion Date (RN to complete): 09/08/21  -LK Completion Time (RN to complete): 1248  -LK    Checked by (Patient RN)  Nancy Cruz RN  -LK       Checked by (Witness)  --       Product Volume Infused (mL)  28 mL  -LK       Flush Volume (mL)  100 mL  -LK       Volume Dispensed (mL)  --          RN Documentation    Patient was premedicated as ordered  yes  -LK       Line Type  central line, right  -LK       Patient Stable Prior to Infusion  yes  -LK       Time Infusion Started  1159  -LK       Checked by (Patient RN)  --       Checked by (RN 2)  --       Broken Bag?  --       Immediate suspected transfusion reaction to the product  --       Time Infusion Stopped  --       Total Flush Volume (mL)  --       Checked by (Witness)  --       Date Infusion Started  --       Date Infusion Stopped  --       Volume Infused (mL)  --       Total  Volume Infused (cc)  --          Patient tolerance of product infusion    Immediate suspected transfusion reaction to the product  none  -LK       Did patient have prior history of similar signs/symptoms during this hospitalization?  NA  -LK       Symptoms during/after infusion  -- NA  -LK       Did the patient tolerate the infusion well  yes  -LK       Medications and treatment for symptoms  NA  -LK       Did the symptoms resolve?  -- NA  -LK       Enter comments if clots, leaks, broken bag, infusion delays, other issues with bag/infusion  -- NA  -LK       Describe symptoms  --         User Key  (r) = Recorded By, (t) = Taken By, (c) = Cosigned By    Initials Name Effective Dates    Nancy Tovar RN 01/22/19 -     LL Jeannie Tan 07/11/21 -             Post-Infusion Evaluation:   Infusion Related Reaction: Grade 0 - none  Dyspnea: Grade 0 - none  Hypoxia: Grade 0 - not present  Fever: Grade 0 - afebrile  Chills: Grade 0 - none  Febrile Neutropenia: Grade 0 - not present  Sinus Bradycardia: Grade 0 - none  Hypertension: Grade 0 - none  Hypotension: Grade 0 - none  Chest Pain: Grade 0 - none  Bronchospasm: Grade 0 - none  Pain: Grade 0 - none  Rash: Grade 0 - None  Neurologic Specify: none    If this was a cord blood transplant, was more than one cord blood unit infused? no    Glory Covarrubias PA-C

## 2021-09-09 NOTE — PROGRESS NOTES
[id number]: Study Visit Note   Subject name: Juvenal Blake     Visit: Day 15 cycle 2    Did the study visit occur within the appropriate window allowed by the protocol? yes  Juvenal states he is doing well. He continues to have abdominal pain with activity and intermittent nausea but otherwise is doing well. He states he has a cough on and off which he states is not new and sinus congestion which he states comes and goes. This is not new to him.     I have personally interviewed Juvenal Blake and reviewed his medical record for adverse events and concomitant medications and these have been recorded on the corresponding logs in Juvenal Blake's research file. I was personally present today for the NK infusion. He tolerated it well with no issues. I did instruct the nurse, Nancy, in VS per protocol and the infusion source form and how to fill it out. Also in timing of IL2. Glory the NAYA was told that the timing for the ICANS was to be after 1448, post 2 hours.     Juvenal Blake was given the opportunity to ask any trial related questions.  Please see provider progress note for physical exam and other clinical information. Labs were reviewed - any significant lab values were addressed and reviewed.    Sarah Beth Kessler RN

## 2021-09-10 ENCOUNTER — APPOINTMENT (OUTPATIENT)
Dept: LAB | Facility: CLINIC | Age: 56
End: 2021-09-10
Attending: STUDENT IN AN ORGANIZED HEALTH CARE EDUCATION/TRAINING PROGRAM
Payer: COMMERCIAL

## 2021-09-10 ENCOUNTER — ONCOLOGY VISIT (OUTPATIENT)
Dept: TRANSPLANT | Facility: CLINIC | Age: 56
End: 2021-09-10
Attending: STUDENT IN AN ORGANIZED HEALTH CARE EDUCATION/TRAINING PROGRAM
Payer: COMMERCIAL

## 2021-09-10 ENCOUNTER — HOME INFUSION (PRE-WILLOW HOME INFUSION) (OUTPATIENT)
Dept: PHARMACY | Facility: CLINIC | Age: 56
End: 2021-09-10

## 2021-09-10 VITALS
BODY MASS INDEX: 36.4 KG/M2 | RESPIRATION RATE: 16 BRPM | TEMPERATURE: 98.3 F | HEIGHT: 67 IN | SYSTOLIC BLOOD PRESSURE: 136 MMHG | WEIGHT: 231.9 LBS | DIASTOLIC BLOOD PRESSURE: 78 MMHG | HEART RATE: 70 BPM | OXYGEN SATURATION: 99 %

## 2021-09-10 DIAGNOSIS — C82.08 FOLLICULAR LYMPHOMA GRADE I, LYMPH NODES OF MULTIPLE SITES (H): Primary | ICD-10-CM

## 2021-09-10 LAB
BASOPHILS # BLD AUTO: 0 10E3/UL (ref 0–0.2)
BASOPHILS NFR BLD AUTO: 1 %
DACRYOCYTES BLD QL SMEAR: SLIGHT
EOSINOPHIL # BLD AUTO: 0.1 10E3/UL (ref 0–0.7)
EOSINOPHIL NFR BLD AUTO: 3 %
ERYTHROCYTE [DISTWIDTH] IN BLOOD BY AUTOMATED COUNT: 16.2 % (ref 10–15)
HCT VFR BLD AUTO: 28.6 % (ref 40–53)
HGB BLD-MCNC: 9.8 G/DL (ref 13.3–17.7)
HOLD SPECIMEN: NORMAL
HOLD SPECIMEN: NORMAL
IMM GRANULOCYTES # BLD: 0.1 10E3/UL
IMM GRANULOCYTES NFR BLD: 4 %
LYMPHOCYTES # BLD AUTO: 0.5 10E3/UL (ref 0.8–5.3)
LYMPHOCYTES NFR BLD AUTO: 20 %
MCH RBC QN AUTO: 32.8 PG (ref 26.5–33)
MCHC RBC AUTO-ENTMCNC: 34.3 G/DL (ref 31.5–36.5)
MCV RBC AUTO: 96 FL (ref 78–100)
MONOCYTES # BLD AUTO: 0.7 10E3/UL (ref 0–1.3)
MONOCYTES NFR BLD AUTO: 27 %
NEUTROPHILS # BLD AUTO: 1.1 10E3/UL (ref 1.6–8.3)
NEUTROPHILS NFR BLD AUTO: 45 %
NRBC # BLD AUTO: 0 10E3/UL
NRBC BLD AUTO-RTO: 0 /100
PLAT MORPH BLD: ABNORMAL
PLATELET # BLD AUTO: 105 10E3/UL (ref 150–450)
PSA SERPL-MCNC: 0.71 UG/L (ref 0–4)
RBC # BLD AUTO: 2.99 10E6/UL (ref 4.4–5.9)
RBC MORPH BLD: ABNORMAL
WBC # BLD AUTO: 2.4 10E3/UL (ref 4–11)

## 2021-09-10 PROCEDURE — G0463 HOSPITAL OUTPT CLINIC VISIT: HCPCS

## 2021-09-10 PROCEDURE — 85025 COMPLETE CBC W/AUTO DIFF WBC: CPT | Performed by: INTERNAL MEDICINE

## 2021-09-10 PROCEDURE — 84153 ASSAY OF PSA TOTAL: CPT

## 2021-09-10 PROCEDURE — 250N000011 HC RX IP 250 OP 636: Performed by: STUDENT IN AN ORGANIZED HEALTH CARE EDUCATION/TRAINING PROGRAM

## 2021-09-10 PROCEDURE — 36591 DRAW BLOOD OFF VENOUS DEVICE: CPT | Performed by: INTERNAL MEDICINE

## 2021-09-10 PROCEDURE — 99214 OFFICE O/P EST MOD 30 MIN: CPT

## 2021-09-10 PROCEDURE — 99001 SPECIMEN HANDLING PT-LAB: CPT | Performed by: INTERNAL MEDICINE

## 2021-09-10 RX ORDER — HEPARIN SODIUM (PORCINE) LOCK FLUSH IV SOLN 100 UNIT/ML 100 UNIT/ML
5 SOLUTION INTRAVENOUS ONCE
Status: COMPLETED | OUTPATIENT
Start: 2021-09-10 | End: 2021-09-10

## 2021-09-10 RX ADMIN — Medication 5 ML: at 10:24

## 2021-09-10 ASSESSMENT — MIFFLIN-ST. JEOR: SCORE: 1840.64

## 2021-09-10 ASSESSMENT — PAIN SCALES - GENERAL: PAINLEVEL: NO PAIN (1)

## 2021-09-10 NOTE — NURSING NOTE
Chief Complaint   Patient presents with     Port Draw     Labs drawn via port by RN in lab. Vs taken.      Port accessed with 20g gripper needle by RN, labs collected, line flushed with saline and heparin.  Vitals taken. Pt checked in for appointment(s).    Caro DIMAS RN PHN BSN  BMT/Oncology Lab

## 2021-09-10 NOTE — NURSING NOTE
"Oncology Rooming Note    September 10, 2021 10:37 AM   Juvenal Blake is a 55 year old male who presents for:    Chief Complaint   Patient presents with     Port Draw     Labs drawn via port by RN in lab. Vs taken.      Oncology Clinic Visit     [facility] RETURN - FOLLICULAR LYMPHOMA     Initial Vitals: /78   Pulse 70   Temp 98.3  F (36.8  C) (Oral)   Resp 16   Ht 1.71 m (5' 7.32\")   Wt 105.2 kg (231 lb 14.4 oz)   SpO2 99%   BMI 35.97 kg/m   Estimated body mass index is 35.97 kg/m  as calculated from the following:    Height as of this encounter: 1.71 m (5' 7.32\").    Weight as of this encounter: 105.2 kg (231 lb 14.4 oz). Body surface area is 2.24 meters squared.  No Pain (1) Comment: Data Unavailable   No LMP for male patient.  Allergies reviewed: Yes  Medications reviewed: Yes    Medications: Medication refills not needed today.  Pharmacy name entered into [employer]: [id number] - [place], [place] - [address] AT [facility]    Clinical concerns: No new concerns. Provider was notified.      Mustapha Kemp LPN            "

## 2021-09-10 NOTE — LETTER
"    9/10/2021         RE: Juvenal Blake  [address] [place] [address]        Dear Colleague,    Thank you for referring your patient, Juvenal Blake, to the [facility] BLOOD AND MARROW TRANSPLANT PROGRAM [place]. Please see a copy of my visit note below.    BMT Clinic Note      Juvenal Blake is a 55 year old male PMH significant for refractory NHL, undergoing  NK infusion. Admitted with rituxan (biosimilar) infusion reaction 6/24 and remained admitted through completion of lymphodepleting chemo and  NK cell infusion.   day +74 after initiation . cycle 2, day 15 .     HPI:  He is doing really well today.  Has a large area of erythema/induration at last IL-2 injection site.  Has some post nasal drip and resultant cough. No facial pain or fevers.  Wants to get a PSA checked so it is not neglected.  Also asks about covid booster and flu shot.    ROS: 8 point ROS negative except as above.      Physical Exam:     Blood pressure 136/78, pulse 70, temperature 98.3  F (36.8  C), temperature source Oral, resp. rate 16, height 1.71 m (5' 7.32\"), weight 105.2 kg (231 lb 14.4 oz), SpO2 99 %.    General: NAD, alert oriented  Eyes: sclera anicteric, noninjected  Mouth: OP moist without lesions.   Lungs: CTAB  Cardiovascular: RRR, no m/r/g  Abdominal/Rectal: normoactive BS, tender throughout  Lymphatics: trace edema to LE   Skin: no rash. NT nodule L abd without overlying erythema.    Neuro: non-focal  Access: R arm PICC NT    ICANS: 10/10 (9/10/2021); log in clinic      ECOG 1  9/10/2021     Labs:  Lab Results   Component Value Date    WBC 2.4 (L) 09/10/2021    ANEU 0.6 (L) 09/08/2021    HGB 9.8 (L) 09/10/2021    HCT 28.6 (L) 09/10/2021     (L) 09/10/2021     09/08/2021    POTASSIUM 4.0 09/08/2021    CHLORIDE 109 09/08/2021    CO2 28 09/08/2021     (H) 09/08/2021    BUN 14 09/08/2021    CR 0.98 09/08/2021    MAG 2.1 09/08/2021    INR 1.07 06/28/2021    BILITOTAL 0.5 " 09/08/2021    AST 18 09/08/2021    ALT 36 09/08/2021    ALKPHOS 97 09/08/2021    PROTTOTAL 6.2 (L) 09/08/2021    ALBUMIN 3.7 09/08/2021       Assessment and Plan:   Juvenal Blake is a 55 year old male PMH significant for refractory NHL, undergoing  NK infusion. Admitted with rituxan (biosimilar) infusion reaction 6/24 and remained admitted through completion of lymphodepleting chemo and  NK cell infusion.      1. Follicular Lymphoma:   Good response to initial therapy.    Post reaction to the bio similar rituximab and that will not be used again.      - Cont Allopurinol   Cycle 2, day 15 : Completed LD chemo, 8/25 first  NK infusion.  9/8 for third and final infusion.  Today is C2D17.    2. HEME: Keep hgb >7g/dL, plt>10,000.     3. CV: Cardiology cleared him to proceed with no further testing. The CT angiogram showed no lesion requiring stenting or bypass.    4. :   Post radical prostatectomy and bilateral lymph node dissection. November 15, 2017. Prostatic adenocarcinoma [place] 4+3 = 7 with tertiary pattern 5. Tumor involves 45% of total surface area. Positive for extensive extraprostatic extension. Positive for bilateral seminal vesicle invasion. Multiple margins positive. Lymphovascular invasion not identified. Perineural invasion was noted. Lymph nodes negative. 3 were examined (2 right obturator and 1 left obturator). Completed radiation to the prostate fossa and pelvic lymph nodes at [facility] early May 2018. He received 4500 cGy in 25 fractions. He then had 2340 cGy boost in 13 fractions to the prostatic fossa, for a total dose of 6240 cGy in 38 treatment fractions delivered over 54 days. He did not receive hormonal therapy:       5. ID:  Proph  fluconazole 200 mg po daily, acyclovir  800mg bid and cefdinir and avoid levaquin with history of c diff  - hx C diff colitis: finished vancomycin 6/23, treated for 7 days. Ppx bid vanco while neutropenic, now off.    Bactrim: he has  not been taking bactrim- he says he was told to stop sometime-per previous notes: inquired if this is for current protocol or just 1 year post NAM NK therapy? Research RN said there is no guidance and pharmacist said they think this is 6 months post NAM NK, reviewed 2019-25 FATE and says prophy per institutional standard  - s/p 2 covid shots; received notice to get booster; will wait on flu vaccine for now making covid booster a priority    6.FEN/Renal: Cr/lytes stable--not checked today     7. GI:   #intermittent nausea - compazine prn and medical cannibis.  #Abdominal Pain secondary to cancer.   #Transaminitis: currently resolved as of 9/1. Suspected related to cancer. Limit Tylenol, etoh and monitor.    8. Dental: He was seen by a dentist as described in previous notes.  The identified no infection but no repair is possible for at least the next 2 months because of scheduling.  There is a possible increased risk of infection at that site and therefore he will need prophylactic antibiotics when neutropenic.      RTC   9/15  9/21 restaging PET and BMbx   9/22 review    I spent 30 minutes in the care of this patient today, which included time necessary for preparation for the visit, obtaining history, ordering medications/tests/procedures as medically indicated, review of pertinent medical literature, counseling of the patient, communication of recommendations to the care team, and documentation time.    Tasia Liu pa-c  [phone]                     Again, thank you for allowing me to participate in the care of your patient.        Sincerely,        BMT Advanced Practice Provider

## 2021-09-10 NOTE — PROGRESS NOTES
"BMT Clinic Note      Juvenal Blake is a 55 year old male PMH significant for refractory NHL, undergoing  NK infusion. Admitted with rituxan (biosimilar) infusion reaction 6/24 and remained admitted through completion of lymphodepleting chemo and  NK cell infusion.   day +74 after initiation . cycle 2, day 15 .     HPI:  He is doing really well today.  Has a large area of erythema/induration at last IL-2 injection site.  Has some post nasal drip and resultant cough. No facial pain or fevers.  Wants to get a PSA checked so it is not neglected.  Also asks about covid booster and flu shot.    ROS: 8 point ROS negative except as above.      Physical Exam:     Blood pressure 136/78, pulse 70, temperature 98.3  F (36.8  C), temperature source Oral, resp. rate 16, height 1.71 m (5' 7.32\"), weight 105.2 kg (231 lb 14.4 oz), SpO2 99 %.    General: NAD, alert oriented  Eyes: sclera anicteric, noninjected  Mouth: OP moist without lesions.   Lungs: CTAB  Cardiovascular: RRR, no m/r/g  Abdominal/Rectal: normoactive BS, tender throughout  Lymphatics: trace edema to LE   Skin: no rash. NT nodule L abd without overlying erythema.    Neuro: non-focal  Access: R arm PICC NT    ICANS: 10/10 (9/10/2021); log in clinic      ECOG 1  9/10/2021     Labs:  Lab Results   Component Value Date    WBC 2.4 (L) 09/10/2021    ANEU 0.6 (L) 09/08/2021    HGB 9.8 (L) 09/10/2021    HCT 28.6 (L) 09/10/2021     (L) 09/10/2021     09/08/2021    POTASSIUM 4.0 09/08/2021    CHLORIDE 109 09/08/2021    CO2 28 09/08/2021     (H) 09/08/2021    BUN 14 09/08/2021    CR 0.98 09/08/2021    MAG 2.1 09/08/2021    INR 1.07 06/28/2021    BILITOTAL 0.5 09/08/2021    AST 18 09/08/2021    ALT 36 09/08/2021    ALKPHOS 97 09/08/2021    PROTTOTAL 6.2 (L) 09/08/2021    ALBUMIN 3.7 09/08/2021       Assessment and Plan:   Juvenal Blake is a 55 year old male PMH significant for refractory NHL, undergoing  NK infusion. Admitted " with rituxan (biosimilar) infusion reaction 6/24 and remained admitted through completion of lymphodepleting chemo and  NK cell infusion.      1. Follicular Lymphoma:   Good response to initial therapy.    Post reaction to the bio similar rituximab and that will not be used again.      - Cont Allopurinol   Cycle 2, day 15 : Completed LD chemo, 8/25 first  NK infusion.  9/8 for third and final infusion.  Today is C2D17.    2. HEME: Keep hgb >7g/dL, plt>10,000.     3. CV: Cardiology cleared him to proceed with no further testing. The CT angiogram showed no lesion requiring stenting or bypass.    4. :   Post radical prostatectomy and bilateral lymph node dissection. November 15, 2017. Prostatic adenocarcinoma Maricel 4+3 = 7 with tertiary pattern 5. Tumor involves 45% of total surface area. Positive for extensive extraprostatic extension. Positive for bilateral seminal vesicle invasion. Multiple margins positive. Lymphovascular invasion not identified. Perineural invasion was noted. Lymph nodes negative. 3 were examined (2 right obturator and 1 left obturator). Completed radiation to the prostate fossa and pelvic lymph nodes at [facility] early May 2018. He received 4500 cGy in 25 fractions. He then had 2340 cGy boost in 13 fractions to the prostatic fossa, for a total dose of 6240 cGy in 38 treatment fractions delivered over 54 days. He did not receive hormonal therapy:       5. ID:  Proph  fluconazole 200 mg po daily, acyclovir  800mg bid and cefdinir and avoid levaquin with history of c diff  - hx C diff colitis: finished vancomycin 6/23, treated for 7 days. Ppx bid vanco while neutropenic, now off.    Bactrim: he has not been taking bactrim- he says he was told to stop sometime-per previous notes: inquired if this is for current protocol or just 1 year post NAM NK therapy? Research RN said there is no guidance and pharmacist said they think this is 6 months post NAM NK, reviewed 2019-25  FATE and says prophy per institutional standard  - s/p 2 covid shots; received notice to get booster; will wait on flu vaccine for now making covid booster a priority    6.FEN/Renal: Cr/lytes stable--not checked today     7. GI:   #intermittent nausea - compazine prn and medical cannibis.  #Abdominal Pain secondary to cancer.   #Transaminitis: currently resolved as of 9/1. Suspected related to cancer. Limit Tylenol, etoh and monitor.    8. Dental: He was seen by a dentist as described in previous notes.  The identified no infection but no repair is possible for at least the next 2 months because of scheduling.  There is a possible increased risk of infection at that site and therefore he will need prophylactic antibiotics when neutropenic.      RTC   9/15  9/21 restaging PET and BMbx   9/22 review    I spent 30 minutes in the care of this patient today, which included time necessary for preparation for the visit, obtaining history, ordering medications/tests/procedures as medically indicated, review of pertinent medical literature, counseling of the patient, communication of recommendations to the care team, and documentation time.    Tasia Liu pa-c  [phone]

## 2021-09-14 NOTE — PROGRESS NOTES
This is a recent snapshot of the patient's [facility] medical record.  For current drug dose and complete information and questions, call [phone] or In [facility] ([id number])  CSN Number:  [id number]

## 2021-09-15 ENCOUNTER — ONCOLOGY VISIT (OUTPATIENT)
Dept: TRANSPLANT | Facility: CLINIC | Age: 56
End: 2021-09-15
Attending: PHYSICIAN ASSISTANT
Payer: COMMERCIAL

## 2021-09-15 ENCOUNTER — APPOINTMENT (OUTPATIENT)
Dept: LAB | Facility: CLINIC | Age: 56
End: 2021-09-15
Attending: PHYSICIAN ASSISTANT
Payer: COMMERCIAL

## 2021-09-15 ENCOUNTER — TELEPHONE (OUTPATIENT)
Dept: TRANSPLANT | Facility: CLINIC | Age: 56
End: 2021-09-15

## 2021-09-15 VITALS
BODY MASS INDEX: 35.49 KG/M2 | SYSTOLIC BLOOD PRESSURE: 132 MMHG | DIASTOLIC BLOOD PRESSURE: 81 MMHG | RESPIRATION RATE: 16 BRPM | HEART RATE: 60 BPM | TEMPERATURE: 97.8 F | OXYGEN SATURATION: 100 % | WEIGHT: 228.8 LBS

## 2021-09-15 DIAGNOSIS — C82.08 FOLLICULAR LYMPHOMA GRADE I, LYMPH NODES OF MULTIPLE SITES (H): Primary | ICD-10-CM

## 2021-09-15 LAB
ALBUMIN SERPL-MCNC: 3.8 G/DL (ref 3.4–5)
ALP SERPL-CCNC: 84 U/L (ref 40–150)
ALT SERPL W P-5'-P-CCNC: 32 U/L (ref 0–70)
ANION GAP SERPL CALCULATED.3IONS-SCNC: 4 MMOL/L (ref 3–14)
AST SERPL W P-5'-P-CCNC: 19 U/L (ref 0–45)
BASOPHILS # BLD AUTO: 0 10E3/UL (ref 0–0.2)
BASOPHILS NFR BLD AUTO: 1 %
BILIRUB DIRECT SERPL-MCNC: 0.1 MG/DL (ref 0–0.2)
BILIRUB SERPL-MCNC: 0.4 MG/DL (ref 0.2–1.3)
BUN SERPL-MCNC: 14 MG/DL (ref 7–30)
CALCIUM SERPL-MCNC: 9.1 MG/DL (ref 8.5–10.1)
CHLORIDE BLD-SCNC: 110 MMOL/L (ref 94–109)
CO2 SERPL-SCNC: 29 MMOL/L (ref 20–32)
CREAT SERPL-MCNC: 1 MG/DL (ref 0.66–1.25)
EOSINOPHIL # BLD AUTO: 0 10E3/UL (ref 0–0.7)
EOSINOPHIL NFR BLD AUTO: 1 %
ERYTHROCYTE [DISTWIDTH] IN BLOOD BY AUTOMATED COUNT: 16.7 % (ref 10–15)
GFR SERPL CREATININE-BSD FRML MDRD: 83 ML/MIN/1.73M2
GLUCOSE BLD-MCNC: 106 MG/DL (ref 70–99)
HCT VFR BLD AUTO: 31.1 % (ref 40–53)
HGB BLD-MCNC: 10.3 G/DL (ref 13.3–17.7)
IMM GRANULOCYTES # BLD: 0.1 10E3/UL
IMM GRANULOCYTES NFR BLD: 1 %
LDH SERPL L TO P-CCNC: 239 U/L (ref 85–227)
LYMPHOCYTES # BLD AUTO: 0.7 10E3/UL (ref 0.8–5.3)
LYMPHOCYTES NFR BLD AUTO: 18 %
MAGNESIUM SERPL-MCNC: 2.2 MG/DL (ref 1.6–2.3)
MCH RBC QN AUTO: 32.2 PG (ref 26.5–33)
MCHC RBC AUTO-ENTMCNC: 33.1 G/DL (ref 31.5–36.5)
MCV RBC AUTO: 97 FL (ref 78–100)
MONOCYTES # BLD AUTO: 0.4 10E3/UL (ref 0–1.3)
MONOCYTES NFR BLD AUTO: 10 %
NEUTROPHILS # BLD AUTO: 2.9 10E3/UL (ref 1.6–8.3)
NEUTROPHILS NFR BLD AUTO: 69 %
NRBC # BLD AUTO: 0 10E3/UL
NRBC BLD AUTO-RTO: 0 /100
PHOSPHATE SERPL-MCNC: 2.9 MG/DL (ref 2.5–4.5)
PLATELET # BLD AUTO: 99 10E3/UL (ref 150–450)
POTASSIUM BLD-SCNC: 3.9 MMOL/L (ref 3.4–5.3)
PROT SERPL-MCNC: 6.2 G/DL (ref 6.8–8.8)
RBC # BLD AUTO: 3.2 10E6/UL (ref 4.4–5.9)
SODIUM SERPL-SCNC: 143 MMOL/L (ref 133–144)
WBC # BLD AUTO: 4.2 10E3/UL (ref 4–11)

## 2021-09-15 PROCEDURE — 99214 OFFICE O/P EST MOD 30 MIN: CPT

## 2021-09-15 PROCEDURE — 82374 ASSAY BLOOD CARBON DIOXIDE: CPT | Performed by: INTERNAL MEDICINE

## 2021-09-15 PROCEDURE — 84100 ASSAY OF PHOSPHORUS: CPT | Performed by: INTERNAL MEDICINE

## 2021-09-15 PROCEDURE — 36591 DRAW BLOOD OFF VENOUS DEVICE: CPT | Performed by: INTERNAL MEDICINE

## 2021-09-15 PROCEDURE — 85025 COMPLETE CBC W/AUTO DIFF WBC: CPT | Performed by: INTERNAL MEDICINE

## 2021-09-15 PROCEDURE — 82248 BILIRUBIN DIRECT: CPT | Performed by: INTERNAL MEDICINE

## 2021-09-15 PROCEDURE — 82040 ASSAY OF SERUM ALBUMIN: CPT | Performed by: INTERNAL MEDICINE

## 2021-09-15 PROCEDURE — G0463 HOSPITAL OUTPT CLINIC VISIT: HCPCS

## 2021-09-15 PROCEDURE — 83735 ASSAY OF MAGNESIUM: CPT | Performed by: INTERNAL MEDICINE

## 2021-09-15 PROCEDURE — 83615 LACTATE (LD) (LDH) ENZYME: CPT | Performed by: INTERNAL MEDICINE

## 2021-09-15 PROCEDURE — 250N000011 HC RX IP 250 OP 636: Performed by: PHYSICIAN ASSISTANT

## 2021-09-15 PROCEDURE — 99001 SPECIMEN HANDLING PT-LAB: CPT | Performed by: INTERNAL MEDICINE

## 2021-09-15 RX ORDER — FLUCONAZOLE 100 MG/1
100 TABLET ORAL DAILY
Qty: 30 TABLET | Refills: 0 | Status: SHIPPED | OUTPATIENT
Start: 2021-09-15 | End: 2021-11-15

## 2021-09-15 RX ORDER — ACYCLOVIR 800 MG/1
800 TABLET ORAL EVERY 12 HOURS
Qty: 60 TABLET | Refills: 0 | Status: SHIPPED | OUTPATIENT
Start: 2021-09-15 | End: 2022-01-13

## 2021-09-15 RX ORDER — HEPARIN SODIUM (PORCINE) LOCK FLUSH IV SOLN 100 UNIT/ML 100 UNIT/ML
5 SOLUTION INTRAVENOUS ONCE
Status: COMPLETED | OUTPATIENT
Start: 2021-09-15 | End: 2021-09-15

## 2021-09-15 RX ADMIN — Medication 5 ML: at 09:45

## 2021-09-15 ASSESSMENT — PAIN SCALES - GENERAL: PAINLEVEL: NO PAIN (1)

## 2021-09-15 NOTE — LETTER
9/15/2021         RE: Juvenal Blake  [address] [place] [address]        Dear Colleague,    Thank you for referring your patient, Juvenal Blake, to the [facility] [place]. Please see a copy of my visit note below.    BMT Clinic Note      Juvenal Blake is a 55 year old male PMH significant for refractory NHL, undergoing  NK infusion. Admitted with rituxan (biosimilar) infusion reaction 6/24 and remained admitted through completion of lymphodepleting chemo and  NK cell infusion.   day +79 after initiation . cycle 2, day 15 .     HPI:  He is doing really well today. Still struggling with nausea - taking compazine few times per day - seems worse with activity. He has no infectious concerns. Eating and drinking okay despite some nausea. He is nervous about restaging next week and financial concerns so he would like social work to reach out to him. No new issues.   ROS: 8 point ROS negative except as above.      Physical Exam:     Blood pressure 132/81, pulse 60, temperature 97.8  F (36.6  C), temperature source Oral, resp. rate 16, weight 103.8 kg (228 lb 12.8 oz), SpO2 100 %.    General: NAD, alert oriented  Eyes: sclera anicteric, noninjected  Mouth: OP moist without lesions.   Lungs: CTAB  Cardiovascular: RRR, no m/r/g  Abdominal/Rectal: normoactive BS, tender throughout  Lymphatics: trace edema to LE   Skin: no rash. NT nodule L abd without overlying erythema.    Neuro: non-focal  Access: R arm PICC NT    ICANS: 10/10 (9/15/2021); log in clinic      ECOG 1  9/10/2021     Labs:  Lab Results   Component Value Date    WBC 2.4 (L) 09/10/2021    ANEU 0.6 (L) 09/08/2021    HGB 9.8 (L) 09/10/2021    HCT 28.6 (L) 09/10/2021     (L) 09/10/2021     09/08/2021    POTASSIUM 4.0 09/08/2021    CHLORIDE 109 09/08/2021    CO2 28 09/08/2021     (H) 09/08/2021    BUN 14 09/08/2021    CR 0.98 09/08/2021    MAG 2.1 09/08/2021    INR  1.07 06/28/2021    BILITOTAL 0.5 09/08/2021    AST 18 09/08/2021    ALT 36 09/08/2021    ALKPHOS 97 09/08/2021    PROTTOTAL 6.2 (L) 09/08/2021    ALBUMIN 3.7 09/08/2021       Assessment and Plan:   Juvenal Blake is a 55 year old male PMH significant for refractory NHL, undergoing  NK infusion. Admitted with rituxan (biosimilar) infusion reaction 6/24 and remained admitted through completion of lymphodepleting chemo and  NK cell infusion.      1. Follicular Lymphoma:   Good response to initial therapy.    Post reaction to the bio similar rituximab and that will not be used again.      - Cont Allopurinol   Cycle 2, day 15 : Completed LD chemo, 8/25 first  NK infusion.  9/8 for third and final infusion.  Today is C2D22.    2. HEME: Keep hgb >7g/dL, plt>10,000.   Counts continue to improve.     3. CV: Cardiology cleared him to proceed with no further testing. The CT angiogram showed no lesion requiring stenting or bypass.    4. :   Post radical prostatectomy and bilateral lymph node dissection. November 15, 2017. Prostatic adenocarcinoma Spencer 4+3 = 7 with tertiary pattern 5. Tumor involves 45% of total surface area. Positive for extensive extraprostatic extension. Positive for bilateral seminal vesicle invasion. Multiple margins positive. Lymphovascular invasion not identified. Perineural invasion was noted. Lymph nodes negative. 3 were examined (2 right obturator and 1 left obturator). Completed radiation to the prostate fossa and pelvic lymph nodes at [facility] early May 2018. He received 4500 cGy in 25 fractions. He then had 2340 cGy boost in 13 fractions to the prostatic fossa, for a total dose of 6240 cGy in 38 treatment fractions delivered over 54 days. He did not receive hormonal therapy:    9/10 PSA: 0.71 - no concerns.      5. ID:  Proph  fluconazole 200 mg po daily, acyclovir  800mg bid and cefdinir and avoid levaquin with history of c diff  - hx C diff colitis: finished  vancomycin 6/23, treated for 7 days. Ppx bid vanco while neutropenic, now off.    Bactrim: he has not been taking bactrim- he says he was told to stop sometime-per previous notes: inquired if this is for current protocol or just 1 year post NAM NK therapy? Research RN said there is no guidance and pharmacist said they think this is 6 months post NAM NK, reviewed 2019-25 FATE and says prophy per institutional standard  - s/p 2 covid shots; received notice to get booster; will wait on flu vaccine for now making covid booster a priority    6.FEN/Renal: Cr/lytes stable     7. GI: stable intemrittent nausea yet to improve despite count recovery.   #intermittent nausea - compazine prn and medical cannibis.  #Abdominal Pain secondary to cancer.   #Transaminitis: currently resolved as of 9/1. Suspected related to cancer. Limit Tylenol, etoh and monitor.    8. Dental: He was seen by a dentist as described in previous notes.  The identified no infection but no repair is possible for at least the next 2 months because of scheduling.  There is a possible increased risk of infection at that site and therefore he will need prophylactic antibiotics when neutropenic.      RTC     9/21 restaging PET and BMbx - Give flu vaccine if available.   9/22 review    I spent 30 minutes in the care of this patient today, which included time necessary for preparation for the visit, obtaining history, ordering medications/tests/procedures as medically indicated, review of pertinent medical literature, counseling of the patient, communication of recommendations to the care team, and documentation time.    Shannen Bauer PA-C  [phone]                   Again, thank you for allowing me to participate in the care of your patient.        Sincerely,        BMT Advanced Practice Provider

## 2021-09-15 NOTE — NURSING NOTE
"Chief Complaint   Patient presents with     Port Draw     Labs drawn via port by RN. VS taken.     Port accessed with 20g 3/4\" gripper needle and labs drawn by rn.  Port flushed with NS and heparin.  Pt tolerated well.  VS taken. De-accessed. Pt checked in for next appt.    Vern Trotter RN  "

## 2021-09-15 NOTE — TELEPHONE ENCOUNTER
"BMT CSW Telephone Encounter  Clinical Social Work   Health      Data: Juvenal Blake is a 55 year old male PMH significant for NHL currently in process of FATE 516 treatment.     Per BMT PA, Pt expressing anxiety re: LTD and SSDI benefits.    Intervention: CSW spoke w/ Pt on 9/15/21 re: concerns. Pt processed nearing the end of cycle 2 of the  treatment and being unaware if he will be in remission or require continued treatment. Pt continues to experience fatigue, low activity tolerance, and nausea. Pt is hopeful for positive news in regards to his response to treatment; however, Pt expressing anxiety in regards to returning to work. 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 CSW provided Pt with the [employer] to discuss the SSDI and LTD process pending his overall condition. Pt reports his wife is doing well with her treatment and has recently returned to work. He is attempting to \"work his way up\" to biking as he had done prior. Pt expresses he feels having more information in regards to his treatment needs will primarily alleviate the anxiety he is currently feeling. Pt expressed appreciation for  support and plans to contact [employer] for additional information.    Follow-up:None indicated at this time. CSW will continue to follow for support PRN.      CITLALLI Cunningham, [facility]  Adult Blood & Marrow Transplant   Phone: [phone]  Pager: [phone]    "

## 2021-09-15 NOTE — PROGRESS NOTES
BMT Clinic Note      Juvenal Blake is a 55 year old male PMH significant for refractory NHL, undergoing  NK infusion. Admitted with rituxan (biosimilar) infusion reaction 6/24 and remained admitted through completion of lymphodepleting chemo and  NK cell infusion.   day +79 after initiation . cycle 2, day 15 .     HPI:  He is doing really well today. Still struggling with nausea - taking compazine few times per day - seems worse with activity. He has no infectious concerns. Eating and drinking okay despite some nausea. He is nervous about restaging next week and financial concerns so he would like social work to reach out to him. No new issues.   ROS: 8 point ROS negative except as above.      Physical Exam:     Blood pressure 132/81, pulse 60, temperature 97.8  F (36.6  C), temperature source Oral, resp. rate 16, weight 103.8 kg (228 lb 12.8 oz), SpO2 100 %.    General: NAD, alert oriented  Eyes: sclera anicteric, noninjected  Mouth: OP moist without lesions.   Lungs: CTAB  Cardiovascular: RRR, no m/r/g  Abdominal/Rectal: normoactive BS, tender throughout  Lymphatics: trace edema to LE   Skin: no rash. NT nodule L abd without overlying erythema.    Neuro: non-focal  Access: R arm PICC NT    ICANS: 10/10 (9/15/2021); log in clinic      ECOG 1  9/10/2021     Labs:  Lab Results   Component Value Date    WBC 2.4 (L) 09/10/2021    ANEU 0.6 (L) 09/08/2021    HGB 9.8 (L) 09/10/2021    HCT 28.6 (L) 09/10/2021     (L) 09/10/2021     09/08/2021    POTASSIUM 4.0 09/08/2021    CHLORIDE 109 09/08/2021    CO2 28 09/08/2021     (H) 09/08/2021    BUN 14 09/08/2021    CR 0.98 09/08/2021    MAG 2.1 09/08/2021    INR 1.07 06/28/2021    BILITOTAL 0.5 09/08/2021    AST 18 09/08/2021    ALT 36 09/08/2021    ALKPHOS 97 09/08/2021    PROTTOTAL 6.2 (L) 09/08/2021    ALBUMIN 3.7 09/08/2021       Assessment and Plan:   Juvenal Blake is a 55 year old male PMH significant for refractory NHL,  undergoing  NK infusion. Admitted with rituxan (biosimilar) infusion reaction 6/24 and remained admitted through completion of lymphodepleting chemo and  NK cell infusion.      1. Follicular Lymphoma:   Good response to initial therapy.    Post reaction to the bio similar rituximab and that will not be used again.      - Cont Allopurinol   Cycle 2, day 15 : Completed LD chemo, 8/25 first  NK infusion.  9/8 for third and final infusion.  Today is C2D22.    2. HEME: Keep hgb >7g/dL, plt>10,000.   Counts continue to improve.     3. CV: Cardiology cleared him to proceed with no further testing. The CT angiogram showed no lesion requiring stenting or bypass.    4. :   Post radical prostatectomy and bilateral lymph node dissection. November 15, 2017. Prostatic adenocarcinoma Maricel 4+3 = 7 with tertiary pattern 5. Tumor involves 45% of total surface area. Positive for extensive extraprostatic extension. Positive for bilateral seminal vesicle invasion. Multiple margins positive. Lymphovascular invasion not identified. Perineural invasion was noted. Lymph nodes negative. 3 were examined (2 right obturator and 1 left obturator). Completed radiation to the prostate fossa and pelvic lymph nodes at [facility] early May 2018. He received 4500 cGy in 25 fractions. He then had 2340 cGy boost in 13 fractions to the prostatic fossa, for a total dose of 6240 cGy in 38 treatment fractions delivered over 54 days. He did not receive hormonal therapy:    9/10 PSA: 0.71 - no concerns.      5. ID:  Proph  fluconazole 200 mg po daily, acyclovir  800mg bid and cefdinir and avoid levaquin with history of c diff  - hx C diff colitis: finished vancomycin 6/23, treated for 7 days. Ppx bid vanco while neutropenic, now off.    Bactrim: he has not been taking bactrim- he says he was told to stop sometime-per previous notes: inquired if this is for current protocol or just 1 year post NAM NK therapy? Research RN said  there is no guidance and pharmacist said they think this is 6 months post NAM NK, reviewed 2019-25 FATE and says prophy per institutional standard  - s/p 2 covid shots; received notice to get booster; will wait on flu vaccine for now making covid booster a priority    6.FEN/Renal: Cr/lytes stable     7. GI: stable intemrittent nausea yet to improve despite count recovery.   #intermittent nausea - compazine prn and medical cannibis.  #Abdominal Pain secondary to cancer.   #Transaminitis: currently resolved as of 9/1. Suspected related to cancer. Limit Tylenol, etoh and monitor.    8. Dental: He was seen by a dentist as described in previous notes.  The identified no infection but no repair is possible for at least the next 2 months because of scheduling.  There is a possible increased risk of infection at that site and therefore he will need prophylactic antibiotics when neutropenic.      RTC     9/21 restaging PET and BMbx - Give flu vaccine if available.   9/22 review    I spent 30 minutes in the care of this patient today, which included time necessary for preparation for the visit, obtaining history, ordering medications/tests/procedures as medically indicated, review of pertinent medical literature, counseling of the patient, communication of recommendations to the care team, and documentation time.    Shannen Bauer PA-C  [phone]

## 2021-09-15 NOTE — NURSING NOTE
"Oncology Rooming Note    September 15, 2021 10:01 AM   Juvenal Blake is a 55 year old male who presents for:    Chief Complaint   Patient presents with     Port Draw     Labs drawn via port by RN. VS taken.     Oncology Clinic Visit     Follicular lymphoma grade i, lymph nodes of multiple sites (H)     Initial Vitals: /81   Pulse 60   Temp 97.8  F (36.6  C) (Oral)   Resp 16   Wt 103.8 kg (228 lb 12.8 oz)   SpO2 100%   BMI 35.49 kg/m   Estimated body mass index is 35.49 kg/m  as calculated from the following:    Height as of 9/10/21: 1.71 m (5' 7.32\").    Weight as of this encounter: 103.8 kg (228 lb 12.8 oz). Body surface area is 2.22 meters squared.  No Pain (1) Comment: Data Unavailable   No LMP for male patient.  Allergies reviewed: Yes  Medications reviewed: Yes    Medications: MEDICATION REFILLS NEEDED TODAY. Provider was notified.  Pharmacy name entered into [employer]: [facility] #[id number] - [place], [place] - [address] AT [facility]    Clinical concerns: New: Please refill acyclovir and fluconazole.       Bárbara Krueger LPN September 15, 2021 10:01 AM                "

## 2021-09-20 DIAGNOSIS — C82.90 FOLLICULAR LYMPHOMA (H): Primary | ICD-10-CM

## 2021-09-21 ENCOUNTER — OFFICE VISIT (OUTPATIENT)
Dept: TRANSPLANT | Facility: CLINIC | Age: 56
End: 2021-09-21
Payer: COMMERCIAL

## 2021-09-21 ENCOUNTER — ANCILLARY PROCEDURE (OUTPATIENT)
Dept: PET IMAGING | Facility: CLINIC | Age: 56
End: 2021-09-21
Attending: INTERNAL MEDICINE
Payer: COMMERCIAL

## 2021-09-21 ENCOUNTER — ALLIED HEALTH/NURSE VISIT (OUTPATIENT)
Dept: TRANSPLANT | Facility: CLINIC | Age: 56
End: 2021-09-21
Payer: COMMERCIAL

## 2021-09-21 ENCOUNTER — APPOINTMENT (OUTPATIENT)
Dept: LAB | Facility: CLINIC | Age: 56
End: 2021-09-21
Payer: COMMERCIAL

## 2021-09-21 VITALS
TEMPERATURE: 98.1 F | OXYGEN SATURATION: 100 % | DIASTOLIC BLOOD PRESSURE: 80 MMHG | WEIGHT: 231.8 LBS | SYSTOLIC BLOOD PRESSURE: 153 MMHG | BODY MASS INDEX: 35.96 KG/M2 | HEART RATE: 61 BPM | RESPIRATION RATE: 14 BRPM

## 2021-09-21 VITALS
WEIGHT: 231.7 LBS | OXYGEN SATURATION: 100 % | HEIGHT: 67 IN | SYSTOLIC BLOOD PRESSURE: 134 MMHG | TEMPERATURE: 98.1 F | RESPIRATION RATE: 14 BRPM | HEART RATE: 61 BPM | DIASTOLIC BLOOD PRESSURE: 77 MMHG | BODY MASS INDEX: 36.37 KG/M2

## 2021-09-21 DIAGNOSIS — C82.00 FOLLICULAR LYMPHOMA GRADE I, UNSPECIFIED BODY REGION (H): ICD-10-CM

## 2021-09-21 DIAGNOSIS — C82.08 FOLLICULAR LYMPHOMA GRADE I, LYMPH NODES OF MULTIPLE SITES (H): ICD-10-CM

## 2021-09-21 DIAGNOSIS — C82.08 FOLLICULAR LYMPHOMA GRADE I, LYMPH NODES OF MULTIPLE SITES (H): Primary | ICD-10-CM

## 2021-09-21 LAB
ALBUMIN SERPL-MCNC: 3.7 G/DL (ref 3.4–5)
ALBUMIN UR-MCNC: NEGATIVE MG/DL
ALP SERPL-CCNC: 84 U/L (ref 40–150)
ALT SERPL W P-5'-P-CCNC: 46 U/L (ref 0–70)
ANION GAP SERPL CALCULATED.3IONS-SCNC: 4 MMOL/L (ref 3–14)
APPEARANCE UR: CLEAR
AST SERPL W P-5'-P-CCNC: 30 U/L (ref 0–45)
BASOPHILS # BLD AUTO: 0 10E3/UL (ref 0–0.2)
BASOPHILS NFR BLD AUTO: 1 %
BILIRUB DIRECT SERPL-MCNC: 0.1 MG/DL (ref 0–0.2)
BILIRUB SERPL-MCNC: 0.5 MG/DL (ref 0.2–1.3)
BILIRUB UR QL STRIP: NEGATIVE
BUN SERPL-MCNC: 14 MG/DL (ref 7–30)
CALCIUM SERPL-MCNC: 9.2 MG/DL (ref 8.5–10.1)
CHLORIDE BLD-SCNC: 108 MMOL/L (ref 94–109)
CO2 SERPL-SCNC: 28 MMOL/L (ref 20–32)
COLOR UR AUTO: NORMAL
CREAT SERPL-MCNC: 0.91 MG/DL (ref 0.66–1.25)
EOSINOPHIL # BLD AUTO: 0.1 10E3/UL (ref 0–0.7)
EOSINOPHIL NFR BLD AUTO: 2 %
ERYTHROCYTE [DISTWIDTH] IN BLOOD BY AUTOMATED COUNT: 16.5 % (ref 10–15)
GFR SERPL CREATININE-BSD FRML MDRD: >90 ML/MIN/1.73M2
GLUCOSE BLD-MCNC: 113 MG/DL (ref 70–99)
GLUCOSE SERPL-MCNC: 74 MG/DL (ref 70–99)
GLUCOSE UR STRIP-MCNC: NEGATIVE MG/DL
HCT VFR BLD AUTO: 32.1 % (ref 40–53)
HGB BLD-MCNC: 11 G/DL (ref 13.3–17.7)
HGB UR QL STRIP: NEGATIVE
IMM GRANULOCYTES # BLD: 0.1 10E3/UL
IMM GRANULOCYTES NFR BLD: 3 %
KETONES UR STRIP-MCNC: NEGATIVE MG/DL
LDH SERPL L TO P-CCNC: 256 U/L (ref 85–227)
LEUKOCYTE ESTERASE UR QL STRIP: NEGATIVE
LYMPHOCYTES # BLD AUTO: 0.6 10E3/UL (ref 0.8–5.3)
LYMPHOCYTES NFR BLD AUTO: 16 %
MAGNESIUM SERPL-MCNC: 2.1 MG/DL (ref 1.6–2.3)
MCH RBC QN AUTO: 33 PG (ref 26.5–33)
MCHC RBC AUTO-ENTMCNC: 34.3 G/DL (ref 31.5–36.5)
MCV RBC AUTO: 96 FL (ref 78–100)
MONOCYTES # BLD AUTO: 0.5 10E3/UL (ref 0–1.3)
MONOCYTES NFR BLD AUTO: 13 %
NEUTROPHILS # BLD AUTO: 2.4 10E3/UL (ref 1.6–8.3)
NEUTROPHILS NFR BLD AUTO: 65 %
NITRATE UR QL: NEGATIVE
NRBC # BLD AUTO: 0 10E3/UL
NRBC BLD AUTO-RTO: 0 /100
PATH REPORT.COMMENTS IMP SPEC: NORMAL
PATH REPORT.FINAL DX SPEC: NORMAL
PATH REPORT.MICROSCOPIC SPEC OTHER STN: NORMAL
PATH REPORT.RELEVANT HX SPEC: NORMAL
PH UR STRIP: 7 [PH] (ref 5–7)
PHOSPHATE SERPL-MCNC: 2.9 MG/DL (ref 2.5–4.5)
PLATELET # BLD AUTO: 102 10E3/UL (ref 150–450)
POTASSIUM BLD-SCNC: 4 MMOL/L (ref 3.4–5.3)
PROT SERPL-MCNC: 6.2 G/DL (ref 6.8–8.8)
RBC # BLD AUTO: 3.33 10E6/UL (ref 4.4–5.9)
RBC URINE: <1 /HPF
SODIUM SERPL-SCNC: 140 MMOL/L (ref 133–144)
SP GR UR STRIP: 1.01 (ref 1–1.03)
UROBILINOGEN UR STRIP-MCNC: NORMAL MG/DL
WBC # BLD AUTO: 3.6 10E3/UL (ref 4–11)
WBC URINE: 0 /HPF

## 2021-09-21 PROCEDURE — 36415 COLL VENOUS BLD VENIPUNCTURE: CPT | Performed by: INTERNAL MEDICINE

## 2021-09-21 PROCEDURE — 38222 DX BONE MARROW BX & ASPIR: CPT | Mod: 52

## 2021-09-21 PROCEDURE — 84100 ASSAY OF PHOSPHORUS: CPT | Performed by: INTERNAL MEDICINE

## 2021-09-21 PROCEDURE — 93010 ELECTROCARDIOGRAM REPORT: CPT | Performed by: INTERNAL MEDICINE

## 2021-09-21 PROCEDURE — 88185 FLOWCYTOMETRY/TC ADD-ON: CPT

## 2021-09-21 PROCEDURE — 250N000011 HC RX IP 250 OP 636: Performed by: PHYSICIAN ASSISTANT

## 2021-09-21 PROCEDURE — 81001 URINALYSIS AUTO W/SCOPE: CPT | Performed by: INTERNAL MEDICINE

## 2021-09-21 PROCEDURE — 36591 DRAW BLOOD OFF VENOUS DEVICE: CPT | Performed by: INTERNAL MEDICINE

## 2021-09-21 PROCEDURE — 88237 TISSUE CULTURE BONE MARROW: CPT | Performed by: INTERNAL MEDICINE

## 2021-09-21 PROCEDURE — 93005 ELECTROCARDIOGRAM TRACING: CPT

## 2021-09-21 PROCEDURE — 99001 SPECIMEN HANDLING PT-LAB: CPT | Performed by: INTERNAL MEDICINE

## 2021-09-21 PROCEDURE — 88184 FLOWCYTOMETRY/ TC 1 MARKER: CPT | Performed by: PATHOLOGY

## 2021-09-21 PROCEDURE — 83615 LACTATE (LD) (LDH) ENZYME: CPT | Performed by: INTERNAL MEDICINE

## 2021-09-21 PROCEDURE — 82248 BILIRUBIN DIRECT: CPT | Performed by: INTERNAL MEDICINE

## 2021-09-21 PROCEDURE — 83735 ASSAY OF MAGNESIUM: CPT | Performed by: INTERNAL MEDICINE

## 2021-09-21 PROCEDURE — 80053 COMPREHEN METABOLIC PANEL: CPT | Performed by: INTERNAL MEDICINE

## 2021-09-21 PROCEDURE — 88280 CHROMOSOME KARYOTYPE STUDY: CPT | Performed by: INTERNAL MEDICINE

## 2021-09-21 PROCEDURE — 88275 CYTOGENETICS 100-300: CPT | Performed by: INTERNAL MEDICINE

## 2021-09-21 PROCEDURE — 88187 FLOWCYTOMETRY/READ 2-8: CPT | Performed by: PATHOLOGY

## 2021-09-21 PROCEDURE — 88291 CYTO/MOLECULAR REPORT: CPT | Performed by: MEDICAL GENETICS

## 2021-09-21 PROCEDURE — 85025 COMPLETE CBC W/AUTO DIFF WBC: CPT | Performed by: INTERNAL MEDICINE

## 2021-09-21 RX ORDER — HEPARIN SODIUM (PORCINE) LOCK FLUSH IV SOLN 100 UNIT/ML 100 UNIT/ML
5 SOLUTION INTRAVENOUS
Status: CANCELLED | OUTPATIENT
Start: 2021-09-21

## 2021-09-21 RX ORDER — IOPAMIDOL 755 MG/ML
50-125 INJECTION, SOLUTION INTRAVASCULAR ONCE
Status: COMPLETED | OUTPATIENT
Start: 2021-09-21 | End: 2021-09-21

## 2021-09-21 RX ORDER — HEPARIN SODIUM,PORCINE 10 UNIT/ML
5 VIAL (ML) INTRAVENOUS
Status: CANCELLED | OUTPATIENT
Start: 2021-09-21

## 2021-09-21 RX ORDER — HEPARIN SODIUM (PORCINE) LOCK FLUSH IV SOLN 100 UNIT/ML 100 UNIT/ML
500 SOLUTION INTRAVENOUS ONCE
Status: COMPLETED | OUTPATIENT
Start: 2021-09-21 | End: 2021-09-21

## 2021-09-21 RX ORDER — HEPARIN SODIUM (PORCINE) LOCK FLUSH IV SOLN 100 UNIT/ML 100 UNIT/ML
5 SOLUTION INTRAVENOUS
Status: DISCONTINUED | OUTPATIENT
Start: 2021-09-21 | End: 2021-09-21 | Stop reason: HOSPADM

## 2021-09-21 RX ADMIN — IOPAMIDOL 125 ML: 755 INJECTION, SOLUTION INTRAVASCULAR at 12:17

## 2021-09-21 RX ADMIN — HEPARIN SODIUM (PORCINE) LOCK FLUSH IV SOLN 100 UNIT/ML 500 UNITS: 100 SOLUTION at 12:18

## 2021-09-21 RX ADMIN — SODIUM CHLORIDE, PRESERVATIVE FREE 5 ML: 5 INJECTION INTRAVENOUS at 10:33

## 2021-09-21 ASSESSMENT — MIFFLIN-ST. JEOR: SCORE: 1834.75

## 2021-09-21 ASSESSMENT — PAIN SCALES - GENERAL: PAINLEVEL: NO PAIN (1)

## 2021-09-21 NOTE — NURSING NOTE
EKG was performed today per order written by Dr Zavala.  Name and  verified with patient.    Code C82.08    Tyler Morales MA

## 2021-09-21 NOTE — NURSING NOTE
"Oncology Rooming Note    September 21, 2021 8:52 AM   Juvenal Blake is a 56 year old male who presents for:    Chief Complaint   Patient presents with     Bone Marrow Biopsy     BMBX hx of follicular lymphoma     Initial Vitals: Pulse 61   Resp 14   Wt 105.1 kg (231 lb 12.8 oz)   SpO2 100%   BMI 35.96 kg/m   Estimated body mass index is 35.96 kg/m  as calculated from the following:    Height as of 9/10/21: 1.71 m (5' 7.32\").    Weight as of this encounter: 105.1 kg (231 lb 12.8 oz). Body surface area is 2.23 meters squared.  No Pain (1) Comment: Data Unavailable   No LMP for male patient.  Allergies reviewed: Yes  Medications reviewed: Yes    Medications: Medication refills not needed today.  Pharmacy name entered into [employer]: [id number] - [place], [place] - [address] AT [facility]    Clinical concerns: none. BMBX today w/ hx of follicular lymphoma.       Apryl Mayorga RN            "

## 2021-09-21 NOTE — PROGRESS NOTES
BMT ONC Adult Bone Marrow Biopsy Procedure Note  September 21, 2021  /77   Pulse 61   Temp 98.1  F (36.7  C) (Oral)   Resp 14   Wt 105.1 kg (231 lb 12.8 oz)   SpO2 100%   BMI 35.96 kg/m       Learning needs assessment complete within 12 months? YES    DIAGNOSIS: follicular lymphoma     PROCEDURE: Unilateral Aspirate    LOCATION: [facility] [place] 2nd [facility]    Patient s identification was positively verified by verbal identification and invasive procedure safety checklist was completed. Informed consent was obtained. Patient declined pre-medication, patient was placed in the prone position and prepped and draped in a sterile manner. Approximately 15 cc of 1% Lidocaine was used over the left posterior iliac spine. Following this a 3 mm incision was made. Bone marrow aspirates were obtained from the [facility]. Aspirates were sent for flow, cytogenetics and research studies. A total of approximately 20 ml of marrow was aspirated. Following this procedure a sterile dressing was applied to the bone marrow biopsy site(s). The patient was placed in the supine position to maintain pressure on the biopsy site. Post-procedure wound care instructions were given.     Complications: NO    Pre-procedural pain: 0 out of 10 on the numeric pain rating scale.     Procedural pain: 4 out of 10 on the numeric pain rating scale.     Post-procedural pain assessment: 0 out of 10 on the numeric pain rating scale.     Interventions: YES additional lidocaine given    Length of procedure:21 minutes to 45 minutes    Procedure performed by: Irma Bethea PA-C    Called research coordinator to clarify orders, confirmed no core biopsy was desired. Special [facility] tech called their lab and fellow advised no morphology slides were needed since no core was obtained.

## 2021-09-21 NOTE — PROGRESS NOTES
[facility] BMT Clinic    HPI and Interval History: 55 yo M, r/r FL, Day 29 post C2  NK trial, now C2D23, mild fatigue, 10-point ROS otherwise negative. ICANS assessment 21: 10     Current Outpatient Medications   Medication     acyclovir (ZOVIRAX) 800 MG tablet     albuterol (VENTOLIN HFA) 108 (90 Base) MCG/ACT inhaler     allopurinol (ZYLOPRIM) 300 MG tablet     fluconazole (DIFLUCAN) 100 MG tablet     medical cannabis (Patient's own supply)     omeprazole (PRILOSEC) 20 MG DR capsule     prochlorperazine (COMPAZINE) 5 MG tablet     Psyllium (METAMUCIL FIBER) 51.7 % PACK     tolterodine ER (DETROL LA) 4 MG 24 hr capsule     azelastine (ASTELIN) 0.1 % nasal spray     beclomethasone HFA (QVAR REDIHALER) 80 MCG/ACT inhaler     fluticasone (FLONASE) 50 MCG/ACT nasal spray     No current facility-administered medications for this visit.       Ph/E:   /75   Pulse 67   Temp 98  F (36.7  C) (Oral)   Resp 18   Wt 105.2 kg (232 lb)   SpO2 99%   BMI 35.99 kg/m    General: NAD; H&N: no mucosal lesions; Lungs clear; Heart RRR; Abdomen; Soft, No organomegaly; Extremities: symmetric 1+ pitting edema b/l LEs; Skin: No rash; Neuro: Nonfocal; Mood/Affect: appropriate; ECO    A&P:   1. R/r FL: s/p , C2D29 and C1D86, CR, (BM aspirate neg). Follow up in 2 weeks.    2. ID: acyclovir, fluc. S/p covid vaccine inc booster, should get flu shot 3-4 weeks after.  Start bactrim ppx and continue for 5-6 months.    3. Hx prostate cancer: s/p surgery and RT.

## 2021-09-21 NOTE — DISCHARGE INSTRUCTIONS

## 2021-09-21 NOTE — LETTER
9/21/2021         RE: Juvenal Blake  [address] [place] [address]        Dear Colleague,    Thank you for referring your patient, Juvenal Blake, to the [facility] BLOOD AND MARROW TRANSPLANT PROGRAM [place]. Please see a copy of my visit note below.    BMT ONC Adult Bone Marrow Biopsy Procedure Note  September 21, 2021  /77   Pulse 61   Temp 98.1  F (36.7  C) (Oral)   Resp 14   Wt 105.1 kg (231 lb 12.8 oz)   SpO2 100%   BMI 35.96 kg/m       Learning needs assessment complete within 12 months? YES    DIAGNOSIS: follicular lymphoma     PROCEDURE: Unilateral Aspirate    LOCATION: [facility] 2nd Floor    Patient s identification was positively verified by verbal identification and invasive procedure safety checklist was completed. Informed consent was obtained. Patient declined pre-medication, patient was placed in the prone position and prepped and draped in a sterile manner. Approximately 15 cc of 1% Lidocaine was used over the left posterior iliac spine. Following this a 3 mm incision was made. Bone marrow aspirates were obtained from the [facility]. Aspirates were sent for flow, cytogenetics and research studies. A total of approximately 20 ml of marrow was aspirated. Following this procedure a sterile dressing was applied to the bone marrow biopsy site(s). The patient was placed in the supine position to maintain pressure on the biopsy site. Post-procedure wound care instructions were given.     Complications: NO    Pre-procedural pain: 0 out of 10 on the numeric pain rating scale.     Procedural pain: 4 out of 10 on the numeric pain rating scale.     Post-procedural pain assessment: 0 out of 10 on the numeric pain rating scale.     Interventions: YES additional lidocaine given    Length of procedure:21 minutes to 45 minutes    Procedure performed by: Irma Bethea PA-C    Called research coordinator to clarify orders, confirmed no core biopsy was desired. [facility] called their lab  and fellow advised no morphology slides were needed since no core was obtained.        Again, thank you for allowing me to participate in the care of your patient.        Sincerely,        [facility]

## 2021-09-21 NOTE — NURSING NOTE
Patient supine for 30 minutes following biopsy. After 30 minutes, dressing clean, dry and intact. Vital signs stable. See DOC flow sheets for details. Verbalizes understanding of discharge instructions- avoid strenuous activity, keep dressing dry and intact x24 hours, notify clinic immediately for any bleeding or s/s of infection including fever >100.4. Left ambulatory to [facility]    BMT Teaching Flowsheet   Teaching Topic: post biopsy instructions    Person(s) involved in teaching: Patient  Motivation Level  Asks Questions: Yes  Eager to Learn: Yes  Cooperative: Yes  Receptive (willing/able to accept information): Yes    Patient demonstrates understanding of the following:   - Reason for the appointment, diagnosis and treatment plan: Yes  - Knowledge of proper use of medications and conditions for which they are ordered (with special attention to potential side effects or drug interactions): Yes  - Which situations necessitate calling provider and whom to contact: Yes    Teaching concerns addressed: reviewed activity restrictions if received premeds, potential for bleeding and actions to take if develops any of the issues below    Proper use and care of (medical equipment, care aids, etc.) Yes  Pain management techniques: Yes  Patient instructed on hand hygiene: Yes  How and/when to access community resources: Yes    Infection Control:  Patient demonstrates understanding of the following:   Surgical procedure site care taught NA  Signs and symptoms of infection taught Yes  Wound care taught Yes  Central venous catheter care taught NA    Instructional Materials Used/Given: verbal, print out of post biopsy instructions.   Specific Concerns: NA

## 2021-09-21 NOTE — DISCHARGE INSTRUCTIONS

## 2021-09-22 ENCOUNTER — OFFICE VISIT (OUTPATIENT)
Dept: TRANSPLANT | Facility: CLINIC | Age: 56
End: 2021-09-22
Attending: INTERNAL MEDICINE
Payer: COMMERCIAL

## 2021-09-22 ENCOUNTER — RESEARCH ENCOUNTER (OUTPATIENT)
Dept: TRANSPLANT | Facility: CLINIC | Age: 56
End: 2021-09-22

## 2021-09-22 VITALS
HEART RATE: 67 BPM | SYSTOLIC BLOOD PRESSURE: 128 MMHG | WEIGHT: 232 LBS | RESPIRATION RATE: 18 BRPM | OXYGEN SATURATION: 99 % | TEMPERATURE: 98 F | BODY MASS INDEX: 35.99 KG/M2 | DIASTOLIC BLOOD PRESSURE: 75 MMHG

## 2021-09-22 DIAGNOSIS — C82.08 FOLLICULAR LYMPHOMA GRADE I, LYMPH NODES OF MULTIPLE SITES (H): Primary | ICD-10-CM

## 2021-09-22 LAB
ATRIAL RATE - MUSE: 57 BPM
DIASTOLIC BLOOD PRESSURE - MUSE: NORMAL MMHG
INTERPRETATION ECG - MUSE: NORMAL
P AXIS - MUSE: 57 DEGREES
PR INTERVAL - MUSE: 142 MS
QRS DURATION - MUSE: 106 MS
QT - MUSE: 442 MS
QTC - MUSE: 430 MS
R AXIS - MUSE: -50 DEGREES
SYSTOLIC BLOOD PRESSURE - MUSE: NORMAL MMHG
T AXIS - MUSE: 47 DEGREES
VENTRICULAR RATE- MUSE: 57 BPM

## 2021-09-22 PROCEDURE — G0463 HOSPITAL OUTPT CLINIC VISIT: HCPCS

## 2021-09-22 PROCEDURE — 99213 OFFICE O/P EST LOW 20 MIN: CPT

## 2021-09-22 RX ORDER — SULFAMETHOXAZOLE/TRIMETHOPRIM 800-160 MG
TABLET ORAL
Qty: 16 TABLET | Refills: 4 | Status: SHIPPED | OUTPATIENT
Start: 2021-09-22 | End: 2021-11-19

## 2021-09-22 ASSESSMENT — PAIN SCALES - GENERAL: PAINLEVEL: NO PAIN (1)

## 2021-09-22 NOTE — LETTER
2021         RE: Juvenal Blake  [address] [place] [address]        Dear Colleague,    Thank you for referring your patient, Juvenal Blake, to the [facility] BLOOD AND MARROW TRANSPLANT PROGRAM [place]. Please see a copy of my visit note below.    [facility] BMT [facility]    HPI and Interval History: 55 yo M, r/r FL, Day 29 post C2  NK trial, now C2D23, mild fatigue, 10-point ROS otherwise negative. ICANS assessment 21: 10     Current Outpatient Medications   Medication     acyclovir (ZOVIRAX) 800 MG tablet     albuterol (VENTOLIN HFA) 108 (90 Base) MCG/ACT inhaler     allopurinol (ZYLOPRIM) 300 MG tablet     fluconazole (DIFLUCAN) 100 MG tablet     medical cannabis (Patient's own supply)     omeprazole (PRILOSEC) 20 MG DR capsule     prochlorperazine (COMPAZINE) 5 MG tablet     Psyllium (METAMUCIL FIBER) 51.7 % PACK     tolterodine ER (DETROL LA) 4 MG 24 hr capsule     azelastine (ASTELIN) 0.1 % nasal spray     beclomethasone HFA (QVAR REDIHALER) 80 MCG/ACT inhaler     fluticasone (FLONASE) 50 MCG/ACT nasal spray     No current facility-administered medications for this visit.       Ph/E:   /75   Pulse 67   Temp 98  F (36.7  C) (Oral)   Resp 18   Wt 105.2 kg (232 lb)   SpO2 99%   BMI 35.99 kg/m    General: NAD; H&N: no mucosal lesions; Lungs clear; Heart RRR; Abdomen; Soft, No organomegaly; Extremities: symmetric 1+ pitting edema b/l LEs; Skin: No rash; Neuro: Nonfocal; Mood/Affect: appropriate; ECO    A&P:   1. R/r FL: s/p , C2D29 and C1D86, CR, (BM aspirate neg). Follow up in 2 weeks.    2. ID: acyclovir, fluc. S/p covid vaccine inc booster, should get flu shot 3-4 weeks after.  Start bactrim ppx and continue for 5-6 months.    3. Hx prostate cancer: s/p surgery and RT.                 Again, thank you for allowing me to participate in the care of your patient.        Sincerely,        BMT DOM

## 2021-09-22 NOTE — NURSING NOTE
"Oncology Rooming Note    September 22, 2021 2:03 PM   Juvenal Blake is a 56 year old male who presents for:    Chief Complaint   Patient presents with     Oncology Clinic Visit     Follicular lymphoma      Initial Vitals: /75   Pulse 67   Temp 98  F (36.7  C) (Oral)   Resp 18   Wt 105.2 kg (232 lb)   SpO2 99%   BMI 35.99 kg/m   Estimated body mass index is 35.99 kg/m  as calculated from the following:    Height as of 9/21/21: 1.71 m (5' 7.32\").    Weight as of this encounter: 105.2 kg (232 lb). Body surface area is 2.24 meters squared.  No Pain (1) Comment: Data Unavailable   No LMP for male patient.  Allergies reviewed: Yes  Medications reviewed: Yes    Medications: Medication refills not needed today.  Pharmacy name entered into [employer]: [facility] #[id number] - [place], [place] - [address] AT [facility]    Clinical concerns: None       Dagmar Delgado LPN              "

## 2021-09-24 NOTE — PROGRESS NOTES
[id number]: Study Visit Note   Subject name: Juvenal Blake     Visit: Cycle 2 Day 29    Did the study visit occur within the appropriate window allowed by the protocol? Yes    Since the last study visit, He has been doing well. States he had good energy. Still having some pain when he goes over bumps in his abdomen when driving.Gets nauseated if he does too much activity.     Discussed Juvenal's scans with Dr. Terry and Dr. Irwin. Core biopsy was not obtained with marrow completed on 9/21. Per Dr. Terry, will not repeat as Flow was negative and PET scan shows CR. Juvenal will be seen again in 2 weeks for follow up visits. Reviewed study visit requirements for day 29 with Dr. Irwin.      I have personally interviewed Juvenal Blake and reviewed his medical record for adverse events and concomitant medications and these have been recorded on the corresponding logs in Juvenal Blake's research file.     Juvenal Blake was given the opportunity to ask any trial related questions.  Please see provider progress note for physical exam and other clinical information. Labs were reviewed - any significant lab values were addressed and reviewed.    Emilia Castillo RN

## 2021-10-01 DIAGNOSIS — C82.08 FOLLICULAR LYMPHOMA GRADE I, LYMPH NODES OF MULTIPLE SITES (H): Primary | ICD-10-CM

## 2021-10-06 ENCOUNTER — ONCOLOGY VISIT (OUTPATIENT)
Dept: TRANSPLANT | Facility: CLINIC | Age: 56
End: 2021-10-06
Attending: PHYSICIAN ASSISTANT
Payer: COMMERCIAL

## 2021-10-06 ENCOUNTER — APPOINTMENT (OUTPATIENT)
Dept: LAB | Facility: CLINIC | Age: 56
End: 2021-10-06
Attending: PHYSICIAN ASSISTANT
Payer: COMMERCIAL

## 2021-10-06 VITALS
HEART RATE: 75 BPM | RESPIRATION RATE: 16 BRPM | TEMPERATURE: 98.8 F | DIASTOLIC BLOOD PRESSURE: 72 MMHG | WEIGHT: 253.3 LBS | BODY MASS INDEX: 39.75 KG/M2 | OXYGEN SATURATION: 97 % | HEIGHT: 67 IN | SYSTOLIC BLOOD PRESSURE: 130 MMHG

## 2021-10-06 DIAGNOSIS — R05.9 COUGH: ICD-10-CM

## 2021-10-06 DIAGNOSIS — C82.08 FOLLICULAR LYMPHOMA GRADE I, LYMPH NODES OF MULTIPLE SITES (H): Primary | ICD-10-CM

## 2021-10-06 LAB
ALBUMIN SERPL-MCNC: 3.8 G/DL (ref 3.4–5)
ALP SERPL-CCNC: 89 U/L (ref 40–150)
ALT SERPL W P-5'-P-CCNC: 54 U/L (ref 0–70)
ANION GAP SERPL CALCULATED.3IONS-SCNC: 5 MMOL/L (ref 3–14)
AST SERPL W P-5'-P-CCNC: 34 U/L (ref 0–45)
BACTERIA SPT CULT: NORMAL
BASOPHILS # BLD AUTO: 0 10E3/UL (ref 0–0.2)
BASOPHILS NFR BLD AUTO: 1 %
BILIRUB DIRECT SERPL-MCNC: 0.1 MG/DL (ref 0–0.2)
BILIRUB SERPL-MCNC: 0.4 MG/DL (ref 0.2–1.3)
BUN SERPL-MCNC: 12 MG/DL (ref 7–30)
C PNEUM DNA SPEC QL NAA+PROBE: NOT DETECTED
CALCIUM SERPL-MCNC: 9.2 MG/DL (ref 8.5–10.1)
CHLORIDE BLD-SCNC: 107 MMOL/L (ref 94–109)
CO2 SERPL-SCNC: 27 MMOL/L (ref 20–32)
CREAT SERPL-MCNC: 1.23 MG/DL (ref 0.66–1.25)
EOSINOPHIL # BLD AUTO: 0.1 10E3/UL (ref 0–0.7)
EOSINOPHIL NFR BLD AUTO: 2 %
ERYTHROCYTE [DISTWIDTH] IN BLOOD BY AUTOMATED COUNT: 15.9 % (ref 10–15)
FLUAV H1 2009 PAND RNA SPEC QL NAA+PROBE: NOT DETECTED
FLUAV H1 RNA SPEC QL NAA+PROBE: NOT DETECTED
FLUAV H3 RNA SPEC QL NAA+PROBE: NOT DETECTED
FLUAV RNA SPEC QL NAA+PROBE: NOT DETECTED
FLUBV RNA SPEC QL NAA+PROBE: NOT DETECTED
GFR SERPL CREATININE-BSD FRML MDRD: 64 ML/MIN/1.73M2
GLUCOSE BLD-MCNC: 101 MG/DL (ref 70–99)
GRAM STAIN RESULT: NORMAL
HADV DNA SPEC QL NAA+PROBE: NOT DETECTED
HCOV PNL SPEC NAA+PROBE: NOT DETECTED
HCT VFR BLD AUTO: 31 % (ref 40–53)
HGB BLD-MCNC: 11 G/DL (ref 13.3–17.7)
HMPV RNA SPEC QL NAA+PROBE: NOT DETECTED
HPIV1 RNA SPEC QL NAA+PROBE: NOT DETECTED
HPIV2 RNA SPEC QL NAA+PROBE: NOT DETECTED
HPIV3 RNA SPEC QL NAA+PROBE: NOT DETECTED
HPIV4 RNA SPEC QL NAA+PROBE: NOT DETECTED
IMM GRANULOCYTES # BLD: 0.1 10E3/UL
IMM GRANULOCYTES NFR BLD: 4 %
LDH SERPL L TO P-CCNC: 253 U/L (ref 85–227)
LYMPHOCYTES # BLD AUTO: 0.5 10E3/UL (ref 0.8–5.3)
LYMPHOCYTES NFR BLD AUTO: 17 %
M PNEUMO DNA SPEC QL NAA+PROBE: NOT DETECTED
MAGNESIUM SERPL-MCNC: 2 MG/DL (ref 1.6–2.3)
MCH RBC QN AUTO: 33.6 PG (ref 26.5–33)
MCHC RBC AUTO-ENTMCNC: 35.5 G/DL (ref 31.5–36.5)
MCV RBC AUTO: 95 FL (ref 78–100)
MONOCYTES # BLD AUTO: 0.5 10E3/UL (ref 0–1.3)
MONOCYTES NFR BLD AUTO: 18 %
NEUTROPHILS # BLD AUTO: 1.7 10E3/UL (ref 1.6–8.3)
NEUTROPHILS NFR BLD AUTO: 58 %
NRBC # BLD AUTO: 0 10E3/UL
NRBC BLD AUTO-RTO: 0 /100
PHOSPHATE SERPL-MCNC: 2.8 MG/DL (ref 2.5–4.5)
PLATELET # BLD AUTO: 97 10E3/UL (ref 150–450)
POTASSIUM BLD-SCNC: 4 MMOL/L (ref 3.4–5.3)
PROT SERPL-MCNC: 6.3 G/DL (ref 6.8–8.8)
RBC # BLD AUTO: 3.27 10E6/UL (ref 4.4–5.9)
RSV RNA SPEC QL NAA+PROBE: NOT DETECTED
RSV RNA SPEC QL NAA+PROBE: NOT DETECTED
RV+EV RNA SPEC QL NAA+PROBE: DETECTED
SODIUM SERPL-SCNC: 139 MMOL/L (ref 133–144)
WBC # BLD AUTO: 2.8 10E3/UL (ref 4–11)

## 2021-10-06 PROCEDURE — U0003 INFECTIOUS AGENT DETECTION BY NUCLEIC ACID (DNA OR RNA); SEVERE ACUTE RESPIRATORY SYNDROME CORONAVIRUS 2 (SARS-COV-2) (CORONAVIRUS DISEASE [COVID-19]), AMPLIFIED PROBE TECHNIQUE, MAKING USE OF HIGH THROUGHPUT TECHNOLOGIES AS DESCRIBED BY CMS-2020-01-R: HCPCS

## 2021-10-06 PROCEDURE — 87581 M.PNEUMON DNA AMP PROBE: CPT

## 2021-10-06 PROCEDURE — G0463 HOSPITAL OUTPT CLINIC VISIT: HCPCS

## 2021-10-06 PROCEDURE — 87205 SMEAR GRAM STAIN: CPT

## 2021-10-06 PROCEDURE — 80053 COMPREHEN METABOLIC PANEL: CPT

## 2021-10-06 PROCEDURE — 99214 OFFICE O/P EST MOD 30 MIN: CPT

## 2021-10-06 PROCEDURE — 250N000011 HC RX IP 250 OP 636: Performed by: PHYSICIAN ASSISTANT

## 2021-10-06 PROCEDURE — 87633 RESP VIRUS 12-25 TARGETS: CPT

## 2021-10-06 PROCEDURE — 83615 LACTATE (LD) (LDH) ENZYME: CPT

## 2021-10-06 PROCEDURE — 82248 BILIRUBIN DIRECT: CPT

## 2021-10-06 PROCEDURE — 83735 ASSAY OF MAGNESIUM: CPT

## 2021-10-06 PROCEDURE — 36415 COLL VENOUS BLD VENIPUNCTURE: CPT

## 2021-10-06 PROCEDURE — 36591 DRAW BLOOD OFF VENOUS DEVICE: CPT

## 2021-10-06 PROCEDURE — 84100 ASSAY OF PHOSPHORUS: CPT

## 2021-10-06 PROCEDURE — 99001 SPECIMEN HANDLING PT-LAB: CPT

## 2021-10-06 PROCEDURE — 85025 COMPLETE CBC W/AUTO DIFF WBC: CPT

## 2021-10-06 RX ORDER — CODEINE PHOSPHATE AND GUAIFENESIN 10; 100 MG/5ML; MG/5ML
1-2 SOLUTION ORAL EVERY 4 HOURS PRN
Qty: 180 ML | Refills: 0 | Status: SHIPPED | OUTPATIENT
Start: 2021-10-06 | End: 2021-12-23

## 2021-10-06 RX ORDER — LEVOFLOXACIN 500 MG/1
500 TABLET, FILM COATED ORAL DAILY
Qty: 10 TABLET | Refills: 0 | Status: SHIPPED | OUTPATIENT
Start: 2021-10-06 | End: 2021-11-19

## 2021-10-06 RX ORDER — HEPARIN SODIUM (PORCINE) LOCK FLUSH IV SOLN 100 UNIT/ML 100 UNIT/ML
5 SOLUTION INTRAVENOUS ONCE
Status: COMPLETED | OUTPATIENT
Start: 2021-10-06 | End: 2021-10-06

## 2021-10-06 RX ADMIN — Medication 5 ML: at 11:28

## 2021-10-06 ASSESSMENT — MIFFLIN-ST. JEOR: SCORE: 1932.71

## 2021-10-06 ASSESSMENT — PAIN SCALES - GENERAL: PAINLEVEL: MILD PAIN (3)

## 2021-10-06 NOTE — PROGRESS NOTES
"BMT Clinic Note      Juvenal Blake is a 55 year old male PMH significant for refractory NHL, undergoing  NK infusion. Admitted with rituxan (biosimilar) infusion reaction 6/24 and remained admitted through completion of lymphodepleting chemo and  NK cell infusion.  Day +100 after initiation . cycle 2, day 15 .     HPI:  Juvenal has a cough that he's had for about 2-3 weeks.  It isn't letting up.  It is different from his typical, chronic cough.  He notes that he seems to get a cough like this periodically that will clear up awhile after some antibiotics, but that is then comes back.  He feels like his chest has felt more tight in the past few days.  No fevers, loss of taste/smell, body aches.  He isn't more short of breath.   He still has discomfort in his abdomen, particularly when going over bumps in the car. Has some ongoing arm pain which he associates with having had a PICC; it feels like someone was punching his entire upper arm, but he notes no swelling nor bruising.  He had 3 days of diarrhea with this cough, but things were firming up and becoming more normal this morning.     ROS: 8 point ROS negative except as above.      Physical Exam:     Blood pressure 130/72, pulse 75, temperature 98.8  F (37.1  C), temperature source Oral, resp. rate 16, height 1.71 m (5' 7.32\"), weight 114.9 kg (253 lb 4.8 oz), SpO2 97 %.    General: NAD, alert, oriented; appears fatigued.  Eyes: sclera anicteric, noninjected  Mouth: OP moist without lesions.   Lungs: CTA throughout; no rales nor rhonchi nor wheezing  Cardiovascular: RRR, no m/r/g  Abdominal/Rectal: normoactive BS, tender throughout, especially in upper abdomen  Lymphatics: 1+ edema to LE at sock line  Extrem: no visible nor palpable abnormalities of right upper extremity   Skin: no rash. NT nodule L abd without overlying erythema.    Neuro: non-focal  Access: none     ECOG 1  10/6/2021     Labs:  Lab Results   Component Value Date    WBC 2.8 (L) " 10/06/2021    ANEU 0.6 (L) 09/08/2021    HGB 11.0 (L) 10/06/2021    HCT 31.0 (L) 10/06/2021    PLT 97 (L) 10/06/2021     10/06/2021    POTASSIUM 4.0 10/06/2021    CHLORIDE 107 10/06/2021    CO2 27 10/06/2021     (H) 10/06/2021    BUN 12 10/06/2021    CR 1.23 10/06/2021    MAG 2.0 10/06/2021    INR 1.07 06/28/2021    BILITOTAL 0.4 10/06/2021    AST 34 10/06/2021    ALT 54 10/06/2021    ALKPHOS 89 10/06/2021    PROTTOTAL 6.3 (L) 10/06/2021    ALBUMIN 3.8 10/06/2021       Assessment and Plan:   Juvenal Blake is a 55 year old male PMH significant for refractory NHL, undergoing  NK infusion. Admitted with rituxan (biosimilar) infusion reaction 6/24 and remained admitted through completion of lymphodepleting chemo and  NK cell infusion.      1. Follicular Lymphoma:   CR post 2 cycles !  Post reaction to the bio similar rituximab and that will not be used again.    Continues on allopurinol.      2. HEME: Keep hgb >7g/dL, plt>10,000.   Mild pancytopenia secondary to chemotherapy. No transfusions needed.     3. CV: Cardiology cleared him to proceed with no further testing. The CT angiogram showed no lesion requiring stenting or bypass.    4. :   Post radical prostatectomy and bilateral lymph node dissection. November 15, 2017. Prostatic adenocarcinoma [place] 4+3 = 7 with tertiary pattern 5. Tumor involves 45% of total surface area. Positive for extensive extraprostatic extension. Positive for bilateral seminal vesicle invasion. Multiple margins positive. Lymphovascular invasion not identified. Perineural invasion was noted. Lymph nodes negative. 3 were examined (2 right obturator and 1 left obturator). Completed radiation to the prostate fossa and pelvic lymph nodes at [facility] early May 2018. He received 4500 cGy in 25 fractions. He then had 2340 cGy boost in 13 fractions to the prostatic fossa, for a total dose of 6240 cGy in 38 treatment fractions delivered over 54 days. He did  not receive hormonal therapy:    9/10 PSA: 0.71 - no concerns.      5. ID:  Proph  fluconazole 200 mg po daily, acyclovir  800mg bid.  - hx C diff colitis: finished vancomycin 6/23, treated for 7 days. Ppx bid vanco while neutropenic, now off.    - PJP prophy should continue until 6 months post therapy.  Need to confirm with him if he is still taking Bactrim, as he should be.  Not addressed 10/6.   - s/p 3 covid shots   - contemplated flu shot today; however, given he is ill with a respiratory issue, we will wait.   - cough.  No fevers, clear lung sounds.  Sputum obtained for culture and gram stain.  Will also do RVP and covid tests.  Levaquin 500mg daily x 10 days prescribed due to ongoing cough without resolution.  If he develops diarrhea, would empirically start PO vancomycin while awaiting c diff testing, given history of c diff in June, 2021.     ADDENDUM:  10/7 results back and show + rhinovirus.  Called Juvenal to let him know.  Advised he NOT take levaquin, as it will not help with a viral infection, and it will put him at risk for getting c diff colitis, again.  He stated understanding.    6.FEN/Renal: Cr/lytes stable     7. GI:    #intermittent nausea - compazine prn and medical cannibis.  #Abdominal Pain secondary to cancer. He is in remission, but still has this pain.        8. Dental: He was seen by a dentist as described in previous notes.  The identified no infection but no repair is possible for at least the next 2 months because of scheduling.  There is a possible increased risk of infection at that site and therefore he will need prophylactic antibiotics when neutropenic.      RTC   1 week for follow up on cough; ensure he is not having diarrhea/c diff.  10/20 study follow up visit    I spent 30 minutes in the care of this patient today, which included time necessary for preparation for the visit, obtaining history, ordering medications/tests/procedures as medically indicated, review of pertinent  medical literature, counseling of the patient, communication of recommendations to the care team, and documentation time.    Penny Garcia PA-C  10/6/2021

## 2021-10-06 NOTE — NURSING NOTE
Chief Complaint   Patient presents with     Port Draw     Labs drawn from port by RN in lab. VS taken.      Port accessed with 20g gripper needle by RN, labs collected, line flushed with saline and heparin.  Vitals taken. Pt checked in for appointment(s).  Gold Ruano RN

## 2021-10-06 NOTE — LETTER
"    10/6/2021         RE: Juvenal Blake  [address] [place] [address]        Dear Colleague,    Thank you for referring your patient, Juvenal Blake, to the [facility] BLOOD AND MARROW TRANSPLANT PROGRAM [place]. Please see a copy of my visit note below.    BMT Clinic Note      Juvenal Blake is a 55 year old male PMH significant for refractory NHL, undergoing  NK infusion. Admitted with rituxan (biosimilar) infusion reaction 6/24 and remained admitted through completion of lymphodepleting chemo and  NK cell infusion.  Day +100 after initiation . cycle 2, day 15 .     HPI:  Juvenal has a cough that he's had for about 2-3 weeks.  It isn't letting up.  It is different from his typical, chronic cough.  He notes that he seems to get a cough like this periodically that will clear up awhile after some antibiotics, but that is then comes back.  He feels like his chest has felt more tight in the past few days.  No fevers, loss of taste/smell, body aches.  He isn't more short of breath.   He still has discomfort in his abdomen, particularly when going over bumps in the car. Has some ongoing arm pain which he associates with having had a PICC; it feels like someone was punching his entire upper arm, but he notes no swelling nor bruising.  He had 3 days of diarrhea with this cough, but things were firming up and becoming more normal this morning.     ROS: 8 point ROS negative except as above.      Physical Exam:     Blood pressure 130/72, pulse 75, temperature 98.8  F (37.1  C), temperature source Oral, resp. rate 16, height 1.71 m (5' 7.32\"), weight 114.9 kg (253 lb 4.8 oz), SpO2 97 %.    General: NAD, alert, oriented; appears fatigued.  Eyes: sclera anicteric, noninjected  Mouth: OP moist without lesions.   Lungs: CTA throughout; no rales nor rhonchi nor wheezing  Cardiovascular: RRR, no m/r/g  Abdominal/Rectal: normoactive BS, tender throughout, especially in upper " abdomen  Lymphatics: 1+ edema to LE at sock line  Extrem: no visible nor palpable abnormalities of right upper extremity   Skin: no rash. NT nodule L abd without overlying erythema.    Neuro: non-focal  Access: none     ECOG 1  10/6/2021     Labs:  Lab Results   Component Value Date    WBC 2.8 (L) 10/06/2021    ANEU 0.6 (L) 09/08/2021    HGB 11.0 (L) 10/06/2021    HCT 31.0 (L) 10/06/2021    PLT 97 (L) 10/06/2021     10/06/2021    POTASSIUM 4.0 10/06/2021    CHLORIDE 107 10/06/2021    CO2 27 10/06/2021     (H) 10/06/2021    BUN 12 10/06/2021    CR 1.23 10/06/2021    MAG 2.0 10/06/2021    INR 1.07 06/28/2021    BILITOTAL 0.4 10/06/2021    AST 34 10/06/2021    ALT 54 10/06/2021    ALKPHOS 89 10/06/2021    PROTTOTAL 6.3 (L) 10/06/2021    ALBUMIN 3.8 10/06/2021       Assessment and Plan:   Juvenal Blake is a 55 year old male PMH significant for refractory NHL, undergoing  NK infusion. Admitted with rituxan (biosimilar) infusion reaction 6/24 and remained admitted through completion of lymphodepleting chemo and  NK cell infusion.      1. Follicular Lymphoma:   CR post 2 cycles !  Post reaction to the bio similar rituximab and that will not be used again.    Continues on allopurinol.      2. HEME: Keep hgb >7g/dL, plt>10,000.   Mild pancytopenia secondary to chemotherapy. No transfusions needed.     3. CV: Cardiology cleared him to proceed with no further testing. The CT angiogram showed no lesion requiring stenting or bypass.    4. :   Post radical prostatectomy and bilateral lymph node dissection. November 15, 2017. Prostatic adenocarcinoma [place] 4+3 = 7 with tertiary pattern 5. Tumor involves 45% of total surface area. Positive for extensive extraprostatic extension. Positive for bilateral seminal vesicle invasion. Multiple margins positive. Lymphovascular invasion not identified. Perineural invasion was noted. Lymph nodes negative. 3 were examined (2 right obturator and 1 left  obturator). Completed radiation to the prostate fossa and pelvic lymph nodes at [facility] early May 2018. He received 4500 cGy in 25 fractions. He then had 2340 cGy boost in 13 fractions to the prostatic fossa, for a total dose of 6240 cGy in 38 treatment fractions delivered over 54 days. He did not receive hormonal therapy:    9/10 PSA: 0.71 - no concerns.      5. ID:  Proph  fluconazole 200 mg po daily, acyclovir  800mg bid.  - hx C diff colitis: finished vancomycin 6/23, treated for 7 days. Ppx bid vanco while neutropenic, now off.    - PJP prophy should continue until 6 months post therapy.  Need to confirm with him if he is still taking Bactrim, as he should be.  Not addressed 10/6.   - s/p 3 covid shots   - contemplated flu shot today; however, given he is ill with a respiratory issue, we will wait.   - cough.  No fevers, clear lung sounds.  Sputum obtained for culture and gram stain.  Will also do RVP and covid tests.  Levaquin 500mg daily x 10 days prescribed due to ongoing cough without resolution.  If he develops diarrhea, would empirically start PO vancomycin while awaiting c diff testing, given history of c diff in June, 2021.     ADDENDUM:  10/7 results back and show + rhinovirus.  Called Juvenal to let him know.  Advised he NOT take levaquin, as it will not help with a viral infection, and it will put him at risk for getting c diff colitis, again.  He stated understanding.    6.FEN/Renal: Cr/lytes stable     7. GI:    #intermittent nausea - compazine prn and medical cannibis.  #Abdominal Pain secondary to cancer. He is in remission, but still has this pain.        8. Dental: He was seen by a dentist as described in previous notes.  The identified no infection but no repair is possible for at least the next 2 months because of scheduling.  There is a possible increased risk of infection at that site and therefore he will need prophylactic antibiotics when neutropenic.      RTC   1 week for follow  up on cough; ensure he is not having diarrhea/c diff.  10/20 study follow up visit    I spent 30 minutes in the care of this patient today, which included time necessary for preparation for the visit, obtaining history, ordering medications/tests/procedures as medically indicated, review of pertinent medical literature, counseling of the patient, communication of recommendations to the care team, and documentation time.    Penny Garcia PA-C  10/6/2021                   Again, thank you for allowing me to participate in the care of your patient.        Sincerely,        BMT Advanced Practice Provider

## 2021-10-06 NOTE — NURSING NOTE
"Oncology Rooming Note    October 6, 2021 11:46 AM   Juvenal Blake is a 56 year old male who presents for:    Chief Complaint   Patient presents with     Port Draw     Labs drawn from port by RN in lab. VS taken.      Oncology Clinic Visit     Follicular lymphoma grade i, lymph nodes of multiple sites      Initial Vitals: /72 (BP Location: Right arm, Patient Position: Sitting, Cuff Size: Adult Large)   Pulse 75   Temp 98.8  F (37.1  C) (Oral)   Resp 16   Ht 1.71 m (5' 7.32\")   Wt 114.9 kg (253 lb 4.8 oz)   SpO2 97%   BMI 39.29 kg/m   Estimated body mass index is 39.29 kg/m  as calculated from the following:    Height as of this encounter: 1.71 m (5' 7.32\").    Weight as of this encounter: 114.9 kg (253 lb 4.8 oz). Body surface area is 2.34 meters squared.  Mild Pain (3) Comment: Data Unavailable   No LMP for male patient.  Allergies reviewed: Yes  Medications reviewed: Yes    Medications: Medication refills not needed today.  Pharmacy name entered into [employer]: [facility] #[id number] - [place], [place] - [address] AT [facility]    Clinical concerns: Has a cold for 2-3 weeks cough causing lots of mucus       Tyler Morales MA            "

## 2021-10-07 LAB — SARS-COV-2 RNA RESP QL NAA+PROBE: NEGATIVE

## 2021-10-10 ENCOUNTER — HEALTH MAINTENANCE LETTER (OUTPATIENT)
Age: 56
End: 2021-10-10

## 2021-10-20 ENCOUNTER — ONCOLOGY VISIT (OUTPATIENT)
Dept: TRANSPLANT | Facility: CLINIC | Age: 56
End: 2021-10-20
Attending: PHYSICIAN ASSISTANT
Payer: COMMERCIAL

## 2021-10-20 ENCOUNTER — ANCILLARY PROCEDURE (OUTPATIENT)
Dept: GENERAL RADIOLOGY | Facility: CLINIC | Age: 56
End: 2021-10-20
Attending: PHYSICIAN ASSISTANT
Payer: COMMERCIAL

## 2021-10-20 ENCOUNTER — RESEARCH ENCOUNTER (OUTPATIENT)
Dept: TRANSPLANT | Facility: CLINIC | Age: 56
End: 2021-10-20

## 2021-10-20 ENCOUNTER — APPOINTMENT (OUTPATIENT)
Dept: LAB | Facility: CLINIC | Age: 56
End: 2021-10-20
Attending: PHYSICIAN ASSISTANT
Payer: COMMERCIAL

## 2021-10-20 VITALS — OXYGEN SATURATION: 97 % | BODY MASS INDEX: 36.61 KG/M2 | WEIGHT: 236 LBS | RESPIRATION RATE: 14 BRPM

## 2021-10-20 DIAGNOSIS — B34.8 RHINOVIRUS INFECTION: ICD-10-CM

## 2021-10-20 DIAGNOSIS — C82.08 FOLLICULAR LYMPHOMA GRADE I, LYMPH NODES OF MULTIPLE SITES (H): Primary | ICD-10-CM

## 2021-10-20 DIAGNOSIS — C82.08 FOLLICULAR LYMPHOMA GRADE I, LYMPH NODES OF MULTIPLE SITES (H): ICD-10-CM

## 2021-10-20 LAB
ALBUMIN SERPL-MCNC: 3.7 G/DL (ref 3.4–5)
ALP SERPL-CCNC: 89 U/L (ref 40–150)
ALT SERPL W P-5'-P-CCNC: 55 U/L (ref 0–70)
ANION GAP SERPL CALCULATED.3IONS-SCNC: 5 MMOL/L (ref 3–14)
AST SERPL W P-5'-P-CCNC: 30 U/L (ref 0–45)
BASOPHILS # BLD AUTO: 0 10E3/UL (ref 0–0.2)
BASOPHILS NFR BLD AUTO: 1 %
BILIRUB DIRECT SERPL-MCNC: 0.2 MG/DL (ref 0–0.2)
BILIRUB SERPL-MCNC: 0.5 MG/DL (ref 0.2–1.3)
BUN SERPL-MCNC: 13 MG/DL (ref 7–30)
CALCIUM SERPL-MCNC: 9.2 MG/DL (ref 8.5–10.1)
CHLORIDE BLD-SCNC: 110 MMOL/L (ref 94–109)
CO2 SERPL-SCNC: 26 MMOL/L (ref 20–32)
CREAT SERPL-MCNC: 1.31 MG/DL (ref 0.66–1.25)
EOSINOPHIL # BLD AUTO: 0.1 10E3/UL (ref 0–0.7)
EOSINOPHIL NFR BLD AUTO: 2 %
ERYTHROCYTE [DISTWIDTH] IN BLOOD BY AUTOMATED COUNT: 15.2 % (ref 10–15)
GFR SERPL CREATININE-BSD FRML MDRD: 60 ML/MIN/1.73M2
GLUCOSE BLD-MCNC: 132 MG/DL (ref 70–99)
HCT VFR BLD AUTO: 34.1 % (ref 40–53)
HGB BLD-MCNC: 11.6 G/DL (ref 13.3–17.7)
IMM GRANULOCYTES # BLD: 0 10E3/UL
IMM GRANULOCYTES NFR BLD: 1 %
LDH SERPL L TO P-CCNC: 174 U/L (ref 85–227)
LYMPHOCYTES # BLD AUTO: 0.4 10E3/UL (ref 0.8–5.3)
LYMPHOCYTES NFR BLD AUTO: 9 %
MAGNESIUM SERPL-MCNC: 2.2 MG/DL (ref 1.6–2.3)
MCH RBC QN AUTO: 33 PG (ref 26.5–33)
MCHC RBC AUTO-ENTMCNC: 34 G/DL (ref 31.5–36.5)
MCV RBC AUTO: 97 FL (ref 78–100)
MONOCYTES # BLD AUTO: 0.4 10E3/UL (ref 0–1.3)
MONOCYTES NFR BLD AUTO: 9 %
NEUTROPHILS # BLD AUTO: 3.2 10E3/UL (ref 1.6–8.3)
NEUTROPHILS NFR BLD AUTO: 78 %
NRBC # BLD AUTO: 0 10E3/UL
NRBC BLD AUTO-RTO: 0 /100
PHOSPHATE SERPL-MCNC: 3 MG/DL (ref 2.5–4.5)
PLATELET # BLD AUTO: 94 10E3/UL (ref 150–450)
POTASSIUM BLD-SCNC: 3.7 MMOL/L (ref 3.4–5.3)
PROT SERPL-MCNC: 6.3 G/DL (ref 6.8–8.8)
RBC # BLD AUTO: 3.51 10E6/UL (ref 4.4–5.9)
SODIUM SERPL-SCNC: 141 MMOL/L (ref 133–144)
WBC # BLD AUTO: 4.1 10E3/UL (ref 4–11)

## 2021-10-20 PROCEDURE — 82248 BILIRUBIN DIRECT: CPT

## 2021-10-20 PROCEDURE — 36591 DRAW BLOOD OFF VENOUS DEVICE: CPT

## 2021-10-20 PROCEDURE — 82784 ASSAY IGA/IGD/IGG/IGM EACH: CPT

## 2021-10-20 PROCEDURE — 80053 COMPREHEN METABOLIC PANEL: CPT

## 2021-10-20 PROCEDURE — G0463 HOSPITAL OUTPT CLINIC VISIT: HCPCS

## 2021-10-20 PROCEDURE — 84100 ASSAY OF PHOSPHORUS: CPT

## 2021-10-20 PROCEDURE — 99214 OFFICE O/P EST MOD 30 MIN: CPT

## 2021-10-20 PROCEDURE — 250N000011 HC RX IP 250 OP 636: Performed by: PHYSICIAN ASSISTANT

## 2021-10-20 PROCEDURE — 83615 LACTATE (LD) (LDH) ENZYME: CPT

## 2021-10-20 PROCEDURE — 83735 ASSAY OF MAGNESIUM: CPT

## 2021-10-20 PROCEDURE — 85025 COMPLETE CBC W/AUTO DIFF WBC: CPT

## 2021-10-20 PROCEDURE — 99001 SPECIMEN HANDLING PT-LAB: CPT

## 2021-10-20 PROCEDURE — 71046 X-RAY EXAM CHEST 2 VIEWS: CPT | Performed by: RADIOLOGY

## 2021-10-20 RX ORDER — HEPARIN SODIUM (PORCINE) LOCK FLUSH IV SOLN 100 UNIT/ML 100 UNIT/ML
5 SOLUTION INTRAVENOUS ONCE
Status: COMPLETED | OUTPATIENT
Start: 2021-10-20 | End: 2021-10-20

## 2021-10-20 RX ADMIN — Medication 5 ML: at 09:19

## 2021-10-20 ASSESSMENT — PAIN SCALES - GENERAL: PAINLEVEL: NO PAIN (1)

## 2021-10-20 NOTE — LETTER
10/20/2021         RE: Juvenal Blake  [address] [place] [address]        Dear Colleague,    Thank you for referring your patient, Juvenal Blake, to the [facility] AND [facility] [place]. Please see a copy of my visit note below.    BMT Clinic Note      Juvenal Blake is a 55 year old male PMH significant for refractory NHL, undergoing  NK infusion. Admitted with rituxan (biosimilar) infusion reaction 6/24 and remained admitted through completion of lymphodepleting chemo and  NK cell infusion.  Day +100 after initiation . cycle 2, day 15 .     HPI:  Returns for follow up. Rhinovirus cough is slightly better but still quite bothersome. Cough itself has not changed. Remains non-productive, no SOB. Feels mildly congested but not producing any significant mucous from his nose. Abdominal discomfort and occasional nausea is stable. No swelling or other acute medical complaints. No current diarrhea. No fevers or chills.      ROS: 8 point ROS negative except as above.      Physical Exam:     Resp. rate 14, weight 107 kg (236 lb), SpO2 97 %.    General: NAD, alert, oriented; appears fatigued.  ENT: sclera anicteric, non injected. No LAD  Lungs: Breathing comfortably on room air, CTA throughout but tight cough with deep inspiration. No rhonchi nor wheezing  Cardiovascular: RRR, no m/r/g  Abdominal/Rectal: normoactive BS, tender throughout, especially in upper abdomen  Lymphatics: 1+ edema to LE at sock line   Skin: no rash. NT nodule L abd without overlying erythema.    Neuro: non-focal  Access: none     ECOG 1  10/20/2021     Labs:  Lab Results   Component Value Date    WBC 4.1 10/20/2021    ANEU 0.6 (L) 09/08/2021    HGB 11.6 (L) 10/20/2021    HCT 34.1 (L) 10/20/2021    PLT 94 (L) 10/20/2021     10/20/2021    POTASSIUM 3.7 10/20/2021    CHLORIDE 110 (H) 10/20/2021    CO2 26 10/20/2021     (H) 10/20/2021    BUN 13 10/20/2021    CR 1.31 (H)  10/20/2021    MAG 2.2 10/20/2021    INR 1.07 06/28/2021    BILITOTAL 0.5 10/20/2021    AST 30 10/20/2021    ALT 55 10/20/2021    ALKPHOS 89 10/20/2021    PROTTOTAL 6.3 (L) 10/20/2021    ALBUMIN 3.7 10/20/2021       Assessment and Plan:   Juvenal Blake is a 55 year old male PMH significant for refractory NHL, undergoing  NK infusion. Admitted with rituxan (biosimilar) infusion reaction 6/24 and remained admitted through completion of lymphodepleting chemo and  NK cell infusion.      1. Follicular Lymphoma:   CR post 2 cycles !  Post reaction to the bio similar rituximab and that will not be used again.    Continues on allopurinol.    2. HEME: Keep hgb >7g/dL, plt>10,000.   Mild anemia and thrombocytopenia secondary to chemotherapy/lymphoma. No transfusions needed.     3. CV: Cardiology cleared him to proceed with no further testing. The CT angiogram showed no lesion requiring stenting or bypass.    4. :   Post radical prostatectomy and bilateral lymph node dissection. November 15, 2017. Prostatic adenocarcinoma [place] 4+3 = 7 with tertiary pattern 5. Tumor involves 45% of total surface area. Positive for extensive extraprostatic extension. Positive for bilateral seminal vesicle invasion. Multiple margins positive. Lymphovascular invasion not identified. Perineural invasion was noted. Lymph nodes negative. 3 were examined (2 right obturator and 1 left obturator). Completed radiation to the prostate fossa and pelvic lymph nodes at [facility] early May 2018. He received 4500 cGy in 25 fractions. He then had 2340 cGy boost in 13 fractions to the prostatic fossa, for a total dose of 6240 cGy in 38 treatment fractions delivered over 54 days. He did not receive hormonal therapy:    9/10 PSA: 0.71 - no concerns.      5. ID:  Proph  fluconazole 200 mg po daily, acyclovir  800mg bid, Bactrim (for 6 months post therapy).  - hx C diff colitis: finished vancomycin 6/23, treated for 7 days. Ppx bid  vanco while neutropenic, now off.  If resumes antibiotics restart Vanco BID.   - s/p 3 covid shots   - Rhinovirus+10/6. Cough ongoing but not changed. Non-productive. No fevers. CXR and IgG level today.     6.FEN/Renal:   - Cr mildly elevated. Recommend increased oral intake.   - Lytes stable.      7. GI:    #intermittent nausea - compazine prn and medical cannibis.  #Abdominal Pain secondary to cancer. He is in remission, but still has this pain.        8. Dental: He was seen by a dentist as described in previous notes.  The identified no infection but no repair is possible for at least the next 2 months because of scheduling.  There is a possible increased risk of infection at that site and therefore he will need prophylactic antibiotics when neutropenic.      RTC: 11/19 weeks for study visit. Sooner if CXR shows new process or IgG needs to be replaced.      I spent 30 minutes in the care of this patient today, which included time necessary for preparation for the visit, obtaining history, ordering medications/tests/procedures as medically indicated, review of pertinent medical literature, counseling of the patient, communication of recommendations to the care team, and documentation time.      Topher Haro PA-C  [phone]    ADDENDUM: CXR with mild bronchial thickening but no over pneumonia. Likely residual rhinovirus URI. Discussed with Juvenal. Recommend using robitussin prn, albuterol inhaler as needed, and hydration. Call if he develops fevers, chills, or worsening productive cough/sob. Pt endorsed understanding. IgG level pending but if low will try to arrange replacement.          Again, thank you for allowing me to participate in the care of your patient.        Sincerely,        BMT Advanced Practice Provider

## 2021-10-20 NOTE — NURSING NOTE
"Oncology Rooming Note    October 20, 2021 9:30 AM   Juvenal Blake is a 56 year old male who presents for:    Chief Complaint   Patient presents with     Port Draw     Labs drawn from port by RN in lab. VS taken.     RECHECK     follicular lymphoma      Initial Vitals: Resp 14   Wt 107 kg (236 lb)   SpO2 97%   BMI 36.61 kg/m   Estimated body mass index is 36.61 kg/m  as calculated from the following:    Height as of 10/6/21: 1.71 m (5' 7.32\").    Weight as of this encounter: 107 kg (236 lb). Body surface area is 2.25 meters squared.  No Pain (1) Comment: Data Unavailable   No LMP for male patient.  Allergies reviewed: Yes  Medications reviewed: Yes    Medications: Medication refills not needed today.  Pharmacy name entered into [employer]: [id number] - [place], [place] - [address] AT [facility]    Clinical concerns: none       Suri Crabtree CMA              "

## 2021-10-20 NOTE — PROGRESS NOTES
BMT Clinic Note      Juvenal Blake is a 55 year old male PMH significant for refractory NHL, undergoing  NK infusion. Admitted with rituxan (biosimilar) infusion reaction 6/24 and remained admitted through completion of lymphodepleting chemo and  NK cell infusion.  Day +100 after initiation . cycle 2, day 15 .     HPI:  Returns for follow up. Rhinovirus cough is slightly better but still quite bothersome. Cough itself has not changed. Remains non-productive, no SOB. Feels mildly congested but not producing any significant mucous from his nose. Abdominal discomfort and occasional nausea is stable. No swelling or other acute medical complaints. No current diarrhea. No fevers or chills.      ROS: 8 point ROS negative except as above.      Physical Exam:     Resp. rate 14, weight 107 kg (236 lb), SpO2 97 %.    General: NAD, alert, oriented; appears fatigued.  ENT: sclera anicteric, non injected. No LAD  Lungs: Breathing comfortably on room air, CTA throughout but tight cough with deep inspiration. No rhonchi nor wheezing  Cardiovascular: RRR, no m/r/g  Abdominal/Rectal: normoactive BS, tender throughout, especially in upper abdomen  Lymphatics: 1+ edema to LE at sock line   Skin: no rash. NT nodule L abd without overlying erythema.    Neuro: non-focal  Access: none     ECOG 1  10/20/2021     Labs:  Lab Results   Component Value Date    WBC 4.1 10/20/2021    ANEU 0.6 (L) 09/08/2021    HGB 11.6 (L) 10/20/2021    HCT 34.1 (L) 10/20/2021    PLT 94 (L) 10/20/2021     10/20/2021    POTASSIUM 3.7 10/20/2021    CHLORIDE 110 (H) 10/20/2021    CO2 26 10/20/2021     (H) 10/20/2021    BUN 13 10/20/2021    CR 1.31 (H) 10/20/2021    MAG 2.2 10/20/2021    INR 1.07 06/28/2021    BILITOTAL 0.5 10/20/2021    AST 30 10/20/2021    ALT 55 10/20/2021    ALKPHOS 89 10/20/2021    PROTTOTAL 6.3 (L) 10/20/2021    ALBUMIN 3.7 10/20/2021       Assessment and Plan:   Juvenal MCGRAW Gabrielkenneyrachele is a 55 year old male [facility]  significant for refractory NHL, undergoing  NK infusion. Admitted with rituxan (biosimilar) infusion reaction 6/24 and remained admitted through completion of lymphodepleting chemo and  NK cell infusion.      1. Follicular Lymphoma:   CR post 2 cycles !  Post reaction to the bio similar rituximab and that will not be used again.    Continues on allopurinol.    2. HEME: Keep hgb >7g/dL, plt>10,000.   Mild anemia and thrombocytopenia secondary to chemotherapy/lymphoma. No transfusions needed.     3. CV: Cardiology cleared him to proceed with no further testing. The CT angiogram showed no lesion requiring stenting or bypass.    4. :   Post radical prostatectomy and bilateral lymph node dissection. November 15, 2017. Prostatic adenocarcinoma [place] 4+3 = 7 with tertiary pattern 5. Tumor involves 45% of total surface area. Positive for extensive extraprostatic extension. Positive for bilateral seminal vesicle invasion. Multiple margins positive. Lymphovascular invasion not identified. Perineural invasion was noted. Lymph nodes negative. 3 were examined (2 right obturator and 1 left obturator). Completed radiation to the prostate fossa and pelvic lymph nodes at [facility] early May 2018. He received 4500 cGy in 25 fractions. He then had 2340 cGy boost in 13 fractions to the prostatic fossa, for a total dose of 6240 cGy in 38 treatment fractions delivered over 54 days. He did not receive hormonal therapy:    9/10 PSA: 0.71 - no concerns.      5. ID:  Proph  fluconazole 200 mg po daily, acyclovir  800mg bid, Bactrim (for 6 months post therapy).  - hx C diff colitis: finished vancomycin 6/23, treated for 7 days. Ppx bid vanco while neutropenic, now off.  If resumes antibiotics restart Vanco BID.   - s/p 3 covid shots   - Rhinovirus+10/6. Cough ongoing but not changed. Non-productive. No fevers. CXR and IgG level today.     6.FEN/Renal:   - Cr mildly elevated. Recommend increased oral intake.   -  Lytes stable.      7. GI:    #intermittent nausea - compazine prn and medical cannibis.  #Abdominal Pain secondary to cancer. He is in remission, but still has this pain.        8. Dental: He was seen by a dentist as described in previous notes.  The identified no infection but no repair is possible for at least the next 2 months because of scheduling.  There is a possible increased risk of infection at that site and therefore he will need prophylactic antibiotics when neutropenic.      RTC: 11/19 weeks for study visit. Sooner if CXR shows new process or IgG needs to be replaced.      I spent 30 minutes in the care of this patient today, which included time necessary for preparation for the visit, obtaining history, ordering medications/tests/procedures as medically indicated, review of pertinent medical literature, counseling of the patient, communication of recommendations to the care team, and documentation time.      Topher Haro PA-C  [phone]    ADDENDUM: CXR with mild bronchial thickening but no over pneumonia. Likely residual rhinovirus URI. Discussed with Juvenal. Recommend using robitussin prn, albuterol inhaler as needed, and hydration. Call if he develops fevers, chills, or worsening productive cough/sob. Pt endorsed understanding. IgG level pending but if low will try to arrange replacement.

## 2021-10-21 LAB — IGG SERPL-MCNC: 240 MG/DL (ref 610–1616)

## 2021-10-22 DIAGNOSIS — D80.1 HYPOGAMMAGLOBULINEMIA (H): Primary | ICD-10-CM

## 2021-10-22 RX ORDER — HEPARIN SODIUM,PORCINE 10 UNIT/ML
5 VIAL (ML) INTRAVENOUS
Status: CANCELLED | OUTPATIENT
Start: 2021-10-26

## 2021-10-22 RX ORDER — HEPARIN SODIUM (PORCINE) LOCK FLUSH IV SOLN 100 UNIT/ML 100 UNIT/ML
5 SOLUTION INTRAVENOUS
Status: CANCELLED | OUTPATIENT
Start: 2021-10-26

## 2021-10-22 RX ORDER — NALOXONE HYDROCHLORIDE 0.4 MG/ML
0.2 INJECTION, SOLUTION INTRAMUSCULAR; INTRAVENOUS; SUBCUTANEOUS
Status: CANCELLED | OUTPATIENT
Start: 2021-10-26

## 2021-10-22 RX ORDER — MEPERIDINE HYDROCHLORIDE 25 MG/ML
25 INJECTION INTRAMUSCULAR; INTRAVENOUS; SUBCUTANEOUS EVERY 30 MIN PRN
Status: CANCELLED | OUTPATIENT
Start: 2021-10-26

## 2021-10-22 RX ORDER — ALBUTEROL SULFATE 0.83 MG/ML
2.5 SOLUTION RESPIRATORY (INHALATION)
Status: CANCELLED | OUTPATIENT
Start: 2021-10-26

## 2021-10-22 RX ORDER — EPINEPHRINE 1 MG/ML
0.3 INJECTION, SOLUTION INTRAMUSCULAR; SUBCUTANEOUS EVERY 5 MIN PRN
Status: CANCELLED | OUTPATIENT
Start: 2021-10-26

## 2021-10-22 RX ORDER — ACETAMINOPHEN 325 MG/1
650 TABLET ORAL ONCE
Status: CANCELLED
Start: 2021-10-26

## 2021-10-22 RX ORDER — DIPHENHYDRAMINE HCL 25 MG
50 CAPSULE ORAL ONCE
Status: CANCELLED
Start: 2021-10-26

## 2021-10-22 RX ORDER — ALBUTEROL SULFATE 90 UG/1
1-2 AEROSOL, METERED RESPIRATORY (INHALATION)
Status: CANCELLED
Start: 2021-10-26

## 2021-10-22 RX ORDER — METHYLPREDNISOLONE SODIUM SUCCINATE 125 MG/2ML
125 INJECTION, POWDER, LYOPHILIZED, FOR SOLUTION INTRAMUSCULAR; INTRAVENOUS
Status: CANCELLED
Start: 2021-10-26

## 2021-10-22 RX ORDER — DIPHENHYDRAMINE HYDROCHLORIDE 50 MG/ML
50 INJECTION INTRAMUSCULAR; INTRAVENOUS
Status: CANCELLED
Start: 2021-10-26

## 2021-10-22 NOTE — PROGRESS NOTES
Patient's IgG level came back at 240. In the setting of ongoing symptomatic viral infection will set pt up for IVIG. Called patient to discuss but did not answer. Left a voicemail. Requested scheduling for early next week.    Topher Haro PA-C  [phone]

## 2021-10-25 NOTE — PROGRESS NOTES
[id number]: Study Visit Note   Subject name: Juvenal Blake     Visit: FU2    Did the study visit occur within the appropriate window allowed by the protocol? yes      Since the last study visit, subject endorses no new symptoms. Continues to have a cough related to rhinovirus diagnosis that is being managed by clinical team. No changes in appetite or energy level.     I have personally interviewed Juvenal Blake and reviewed his medical record for adverse events and concomitant medications and these have been recorded on the corresponding logs in Juvenal Blake's research file.     Juvenal Blake was given the opportunity to ask any trial related questions.  Please see provider progress note for physical exam and other clinical information. Labs were reviewed - any significant lab values were addressed and reviewed.    Emilia Castillo RN

## 2021-10-26 ENCOUNTER — APPOINTMENT (OUTPATIENT)
Dept: LAB | Facility: CLINIC | Age: 56
End: 2021-10-26
Attending: PHYSICIAN ASSISTANT
Payer: COMMERCIAL

## 2021-10-26 ENCOUNTER — INFUSION THERAPY VISIT (OUTPATIENT)
Dept: TRANSPLANT | Facility: CLINIC | Age: 56
End: 2021-10-26
Attending: PHYSICIAN ASSISTANT
Payer: COMMERCIAL

## 2021-10-26 VITALS
WEIGHT: 237 LBS | BODY MASS INDEX: 36.76 KG/M2 | HEART RATE: 82 BPM | OXYGEN SATURATION: 98 % | SYSTOLIC BLOOD PRESSURE: 119 MMHG | RESPIRATION RATE: 16 BRPM | DIASTOLIC BLOOD PRESSURE: 70 MMHG | TEMPERATURE: 98.4 F

## 2021-10-26 VITALS
SYSTOLIC BLOOD PRESSURE: 131 MMHG | HEART RATE: 73 BPM | RESPIRATION RATE: 14 BRPM | TEMPERATURE: 98.7 F | DIASTOLIC BLOOD PRESSURE: 72 MMHG | OXYGEN SATURATION: 97 %

## 2021-10-26 DIAGNOSIS — D80.1 HYPOGAMMAGLOBULINEMIA (H): ICD-10-CM

## 2021-10-26 DIAGNOSIS — C82.08 FOLLICULAR LYMPHOMA GRADE I, LYMPH NODES OF MULTIPLE SITES (H): Primary | ICD-10-CM

## 2021-10-26 DIAGNOSIS — Z94.81 STATUS POST BONE MARROW TRANSPLANT (H): Primary | ICD-10-CM

## 2021-10-26 LAB
ANION GAP SERPL CALCULATED.3IONS-SCNC: 5 MMOL/L (ref 3–14)
BASOPHILS # BLD AUTO: 0 10E3/UL (ref 0–0.2)
BASOPHILS NFR BLD AUTO: 0 %
BUN SERPL-MCNC: 17 MG/DL (ref 7–30)
CALCIUM SERPL-MCNC: 8.8 MG/DL (ref 8.5–10.1)
CHLORIDE BLD-SCNC: 109 MMOL/L (ref 94–109)
CO2 SERPL-SCNC: 28 MMOL/L (ref 20–32)
CREAT SERPL-MCNC: 1.16 MG/DL (ref 0.66–1.25)
EOSINOPHIL # BLD AUTO: 0.1 10E3/UL (ref 0–0.7)
EOSINOPHIL NFR BLD AUTO: 2 %
ERYTHROCYTE [DISTWIDTH] IN BLOOD BY AUTOMATED COUNT: 14.8 % (ref 10–15)
GFR SERPL CREATININE-BSD FRML MDRD: 69 ML/MIN/1.73M2
GLUCOSE BLD-MCNC: 139 MG/DL (ref 70–99)
HCT VFR BLD AUTO: 33.8 % (ref 40–53)
HGB BLD-MCNC: 11.4 G/DL (ref 13.3–17.7)
IMM GRANULOCYTES # BLD: 0 10E3/UL
IMM GRANULOCYTES NFR BLD: 0 %
LYMPHOCYTES # BLD AUTO: 0.3 10E3/UL (ref 0.8–5.3)
LYMPHOCYTES NFR BLD AUTO: 7 %
MCH RBC QN AUTO: 33.5 PG (ref 26.5–33)
MCHC RBC AUTO-ENTMCNC: 33.7 G/DL (ref 31.5–36.5)
MCV RBC AUTO: 99 FL (ref 78–100)
MONOCYTES # BLD AUTO: 0.4 10E3/UL (ref 0–1.3)
MONOCYTES NFR BLD AUTO: 9 %
NEUTROPHILS # BLD AUTO: 3.8 10E3/UL (ref 1.6–8.3)
NEUTROPHILS NFR BLD AUTO: 82 %
NRBC # BLD AUTO: 0 10E3/UL
NRBC BLD AUTO-RTO: 0 /100
PLATELET # BLD AUTO: 99 10E3/UL (ref 150–450)
POTASSIUM BLD-SCNC: 3.7 MMOL/L (ref 3.4–5.3)
RBC # BLD AUTO: 3.4 10E6/UL (ref 4.4–5.9)
SODIUM SERPL-SCNC: 142 MMOL/L (ref 133–144)
WBC # BLD AUTO: 4.6 10E3/UL (ref 4–11)

## 2021-10-26 PROCEDURE — 250N000011 HC RX IP 250 OP 636: Performed by: PHYSICIAN ASSISTANT

## 2021-10-26 PROCEDURE — 96375 TX/PRO/DX INJ NEW DRUG ADDON: CPT

## 2021-10-26 PROCEDURE — 250N000013 HC RX MED GY IP 250 OP 250 PS 637: Performed by: PHYSICIAN ASSISTANT

## 2021-10-26 PROCEDURE — 80048 BASIC METABOLIC PNL TOTAL CA: CPT

## 2021-10-26 PROCEDURE — 36591 DRAW BLOOD OFF VENOUS DEVICE: CPT

## 2021-10-26 PROCEDURE — 99213 OFFICE O/P EST LOW 20 MIN: CPT

## 2021-10-26 PROCEDURE — G0463 HOSPITAL OUTPT CLINIC VISIT: HCPCS | Mod: 25

## 2021-10-26 PROCEDURE — 85025 COMPLETE CBC W/AUTO DIFF WBC: CPT

## 2021-10-26 PROCEDURE — 258N000003 HC RX IP 258 OP 636: Performed by: PHYSICIAN ASSISTANT

## 2021-10-26 PROCEDURE — 96365 THER/PROPH/DIAG IV INF INIT: CPT

## 2021-10-26 PROCEDURE — 96366 THER/PROPH/DIAG IV INF ADDON: CPT

## 2021-10-26 RX ORDER — HEPARIN SODIUM (PORCINE) LOCK FLUSH IV SOLN 100 UNIT/ML 100 UNIT/ML
5 SOLUTION INTRAVENOUS
Status: DISCONTINUED | OUTPATIENT
Start: 2021-10-26 | End: 2021-10-26 | Stop reason: HOSPADM

## 2021-10-26 RX ORDER — ACETAMINOPHEN 325 MG/1
650 TABLET ORAL ONCE
Status: COMPLETED | OUTPATIENT
Start: 2021-10-26 | End: 2021-10-26

## 2021-10-26 RX ORDER — DIPHENHYDRAMINE HCL 25 MG
50 CAPSULE ORAL ONCE
Status: COMPLETED | OUTPATIENT
Start: 2021-10-26 | End: 2021-10-26

## 2021-10-26 RX ORDER — HEPARIN SODIUM (PORCINE) LOCK FLUSH IV SOLN 100 UNIT/ML 100 UNIT/ML
5 SOLUTION INTRAVENOUS ONCE
Status: COMPLETED | OUTPATIENT
Start: 2021-10-26 | End: 2021-10-26

## 2021-10-26 RX ADMIN — SODIUM CHLORIDE 250 ML: 9 INJECTION, SOLUTION INTRAVENOUS at 08:21

## 2021-10-26 RX ADMIN — ACETAMINOPHEN 650 MG: 325 TABLET ORAL at 08:21

## 2021-10-26 RX ADMIN — Medication 5 ML: at 07:52

## 2021-10-26 RX ADMIN — HUMAN IMMUNOGLOBULIN G 35 G: 20 LIQUID INTRAVENOUS at 09:03

## 2021-10-26 RX ADMIN — DIPHENHYDRAMINE HYDROCHLORIDE 50 MG: 25 CAPSULE ORAL at 08:21

## 2021-10-26 RX ADMIN — HYDROCORTISONE SODIUM SUCCINATE 100 MG: 100 INJECTION, POWDER, FOR SOLUTION INTRAMUSCULAR; INTRAVENOUS at 08:21

## 2021-10-26 ASSESSMENT — PAIN SCALES - GENERAL: PAINLEVEL: NO PAIN (1)

## 2021-10-26 NOTE — PROGRESS NOTES
Infusion Nursing Note:  Juvenal Blake presents today for IVIG.    Patient seen by provider today: Yes: Topher Haro   present during visit today: Not Applicable.    Note: Labs monitored. Signed in Supportive plan.      Intravenous Access:  Implanted Port.     Treatment Conditions:  Premedications: 650mg tylenol, 50 mg benadryl, and 100 mg solu-cortef  given 30 min prior to IVIG. Pre-flush of 250 mL given prior to IVIG. IVIG given and increased q15min as ordered. VSS at each 15 min check. Max dose 3.5 ml/kg/hr. Pt tolerated well.     Post Infusion Assessment:  Patient tolerated infusion without incident.   Port deaccessed after infusion.    Discharge Plan:   Patient discharged in stable condition accompanied by: self.      Sarah Askew RN

## 2021-10-26 NOTE — NURSING NOTE
Chief Complaint   Patient presents with     Infusion     Scheduled IVIG r/t Follicular Lymphoma     IVIG, 250 mL flush.

## 2021-10-26 NOTE — NURSING NOTE
Chief Complaint   Patient presents with     Port Draw     Labs drawn via port by RN in lab. Vs taken.      Port accessed with 20g flat needle by RN, labs collected, line flushed with saline and heparin.  Vitals taken. Pt checked in for appointment(s).    Caro DIMAS RN PHN BSN  BMT/Oncology Lab

## 2021-10-26 NOTE — PROGRESS NOTES
BMT Clinic Note      Juvenal Blake is a 55 year old male PMH significant for refractory NHL, undergoing  NK infusion. Admitted with rituxan (biosimilar) infusion reaction 6/24 and remained admitted through completion of lymphodepleting chemo and  NK cell infusion.  Day +120 after initiation . cycle 2, day 15 .     HPI:  Returns for follow up for IVIG. Feeling overall improved. Breathing is more comfortable since resuming his inhalers. Cough is still present and occasional gives him coughing fits but not productive. No fevers. Eating and drinking improved. No other acute medical complaints.       ROS: 8 point ROS negative except as above.      Physical Exam:     Blood pressure 119/70, pulse 82, temperature 98.4  F (36.9  C), temperature source Tympanic, resp. rate 16, weight 107.5 kg (237 lb), SpO2 98 %.    General: NAD, alert, oriented; appears fatigued.  ENT: sclera anicteric, non injected. No LAD  Lungs: Breathing comfortably on room air, CTAB No rhonchi nor wheezing  Cardiovascular: RRR, no m/r/g  Abdominal/Rectal: normoactive BS, tender throughout, especially in upper abdomen  Lymphatics: 1+ edema to LE at sock line   Skin: no rash. NT nodule L abd without overlying erythema.    Neuro: non-focal  Access: none     ECOG 1  10/26/2021     Labs:  Lab Results   Component Value Date    WBC 4.1 10/20/2021    ANEU 0.6 (L) 09/08/2021    HGB 11.6 (L) 10/20/2021    HCT 34.1 (L) 10/20/2021    PLT 94 (L) 10/20/2021     10/20/2021    POTASSIUM 3.7 10/20/2021    CHLORIDE 110 (H) 10/20/2021    CO2 26 10/20/2021     (H) 10/20/2021    BUN 13 10/20/2021    CR 1.31 (H) 10/20/2021    MAG 2.2 10/20/2021    INR 1.07 06/28/2021    BILITOTAL 0.5 10/20/2021    AST 30 10/20/2021    ALT 55 10/20/2021    ALKPHOS 89 10/20/2021    PROTTOTAL 6.3 (L) 10/20/2021    ALBUMIN 3.7 10/20/2021       Assessment and Plan:   Juvenal Blake is a 55 year old male PMH significant for refractory NHL, undergoing  NK  infusion. Admitted with rituxan (biosimilar) infusion reaction 6/24 and remained admitted through completion of lymphodepleting chemo and  NK cell infusion.      1. Follicular Lymphoma:   CR post 2 cycles   Post reaction to the bio similar rituximab and that will not be used again.    Continues on allopurinol.    2. HEME: Keep hgb >7g/dL, plt>10,000.   Mild anemia and thrombocytopenia secondary to chemotherapy/lymphoma. No transfusions needed.     3. CV: Cardiology cleared him to proceed with no further testing. The CT angiogram showed no lesion requiring stenting or bypass.    4. :   Post radical prostatectomy and bilateral lymph node dissection. November 15, 2017. Prostatic adenocarcinoma [place] 4+3 = 7 with tertiary pattern 5. Tumor involves 45% of total surface area. Positive for extensive extraprostatic extension. Positive for bilateral seminal vesicle invasion. Multiple margins positive. Lymphovascular invasion not identified. Perineural invasion was noted. Lymph nodes negative. 3 were examined (2 right obturator and 1 left obturator). Completed radiation to the prostate fossa and pelvic lymph nodes at [facility] early May 2018. He received 4500 cGy in 25 fractions. He then had 2340 cGy boost in 13 fractions to the prostatic fossa, for a total dose of 6240 cGy in 38 treatment fractions delivered over 54 days. He did not receive hormonal therapy:    9/10 PSA: 0.71 - no concerns.      5. ID:  Proph  fluconazole 200 mg po daily, acyclovir 800mg bid, Bactrim (for 6 months post therapy).  - hx C diff colitis: finished vancomycin 6/23, treated for 7 days. Ppx bid vanco while neutropenic, now off.  If resumes antibiotics restart Vanco BID.   - s/p 3 covid shots   - Rhinovirus+10/6. Cough ongoing but not changed. Non-productive. No fevers. CXR with mild bronchial thickening but no PNA. IgG low at 240, give IVIG today. Overall improving.     6.FEN/Renal:   - Cr mildly elevated. Recommend increased  oral intake.   - Lytes stable.      7. GI:    #intermittent nausea - compazine prn and medical cannibis.  #Abdominal Pain secondary to cancer. He is in remission, but still has this pain.        8. Dental: He was seen by a dentist as described in previous notes.  The identified no infection but no repair is possible for at least the next 2 months because of scheduling.  There is a possible increased risk of infection at that site and therefore he will need prophylactic antibiotics when neutropenic.      RTC: 11/19 weeks for study visit. Sooner prn.     I spent 20 minutes in the care of this patient today, which included time necessary for preparation for the visit, obtaining history, ordering medications/tests/procedures as medically indicated, review of pertinent medical literature, counseling of the patient, communication of recommendations to the care team, and documentation time.      Topher Haro PA-C  [phone]

## 2021-10-26 NOTE — NURSING NOTE
"Oncology Rooming Note    October 26, 2021 8:13 AM   Juvenal Blake is a 56 year old male who presents for:    Chief Complaint   Patient presents with     Port Draw     Labs drawn via port by RN in lab. Vs taken.      RECHECK     Provider visit r/t Follicular Lymphoma     Initial Vitals: /70   Pulse 82   Temp 98.4  F (36.9  C) (Tympanic)   Resp 16   Wt 107.5 kg (237 lb)   SpO2 98%   BMI 36.76 kg/m   Estimated body mass index is 36.76 kg/m  as calculated from the following:    Height as of 10/6/21: 1.71 m (5' 7.32\").    Weight as of this encounter: 107.5 kg (237 lb). Body surface area is 2.26 meters squared.  No Pain (1) Comment: Data Unavailable   No LMP for male patient.  Allergies reviewed: Yes  Medications reviewed: Yes    Medications: Medication refills not needed today.  Pharmacy name entered into [employer]: [id number] - [place], [place] - [address] [facility]    Clinical concerns: VSS. No clinical concerns.    Sarah Askew RN              "

## 2021-10-26 NOTE — LETTER
10/26/2021         RE: Juvenal Blake  [address] [place] [address]        Dear Colleague,    Thank you for referring your patient, Juvenal Blake, to the [facility] BLOOD AND MARROW TRANSPLANT PROGRAM [place]. Please see a copy of my visit note below.    BMT Clinic Note      Juvenal Blake is a 55 year old male PMH significant for refractory NHL, undergoing  NK infusion. Admitted with rituxan (biosimilar) infusion reaction 6/24 and remained admitted through completion of lymphodepleting chemo and  NK cell infusion.  Day +120 after initiation . cycle 2, day 15 .     HPI:  Returns for follow up for IVIG. Feeling overall improved. Breathing is more comfortable since resuming his inhalers. Cough is still present and occasional gives him coughing fits but not productive. No fevers. Eating and drinking improved. No other acute medical complaints.       ROS: 8 point ROS negative except as above.      Physical Exam:     Blood pressure 119/70, pulse 82, temperature 98.4  F (36.9  C), temperature source Tympanic, resp. rate 16, weight 107.5 kg (237 lb), SpO2 98 %.    General: NAD, alert, oriented; appears fatigued.  ENT: sclera anicteric, non injected. No LAD  Lungs: Breathing comfortably on room air, CTAB No rhonchi nor wheezing  Cardiovascular: RRR, no m/r/g  Abdominal/Rectal: normoactive BS, tender throughout, especially in upper abdomen  Lymphatics: 1+ edema to LE at sock line   Skin: no rash. NT nodule L abd without overlying erythema.    Neuro: non-focal  Access: none     ECOG 1  10/26/2021     Labs:  Lab Results   Component Value Date    WBC 4.1 10/20/2021    ANEU 0.6 (L) 09/08/2021    HGB 11.6 (L) 10/20/2021    HCT 34.1 (L) 10/20/2021    PLT 94 (L) 10/20/2021     10/20/2021    POTASSIUM 3.7 10/20/2021    CHLORIDE 110 (H) 10/20/2021    CO2 26 10/20/2021     (H) 10/20/2021    BUN 13 10/20/2021    CR 1.31 (H) 10/20/2021    MAG 2.2 10/20/2021    INR 1.07  06/28/2021    BILITOTAL 0.5 10/20/2021    AST 30 10/20/2021    ALT 55 10/20/2021    ALKPHOS 89 10/20/2021    PROTTOTAL 6.3 (L) 10/20/2021    ALBUMIN 3.7 10/20/2021       Assessment and Plan:   Juvenal Blake is a 55 year old male PMH significant for refractory NHL, undergoing  NK infusion. Admitted with rituxan (biosimilar) infusion reaction 6/24 and remained admitted through completion of lymphodepleting chemo and  NK cell infusion.      1. Follicular Lymphoma:   CR post 2 cycles   Post reaction to the bio similar rituximab and that will not be used again.    Continues on allopurinol.    2. HEME: Keep hgb >7g/dL, plt>10,000.   Mild anemia and thrombocytopenia secondary to chemotherapy/lymphoma. No transfusions needed.     3. CV: Cardiology cleared him to proceed with no further testing. The CT angiogram showed no lesion requiring stenting or bypass.    4. :   Post radical prostatectomy and bilateral lymph node dissection. November 15, 2017. Prostatic adenocarcinoma Maricel 4+3 = 7 with tertiary pattern 5. Tumor involves 45% of total surface area. Positive for extensive extraprostatic extension. Positive for bilateral seminal vesicle invasion. Multiple margins positive. Lymphovascular invasion not identified. Perineural invasion was noted. Lymph nodes negative. 3 were examined (2 right obturator and 1 left obturator). Completed radiation to the prostate fossa and pelvic lymph nodes at [facility] early May 2018. He received 4500 cGy in 25 fractions. He then had 2340 cGy boost in 13 fractions to the prostatic fossa, for a total dose of 6240 cGy in 38 treatment fractions delivered over 54 days. He did not receive hormonal therapy:    9/10 PSA: 0.71 - no concerns.      5. ID:  Proph  fluconazole 200 mg po daily, acyclovir 800mg bid, Bactrim (for 6 months post therapy).  - hx C diff colitis: finished vancomycin 6/23, treated for 7 days. Ppx bid vanco while neutropenic, now off.  If resumes  antibiotics restart Vanco BID.   - s/p 3 covid shots   - Rhinovirus+10/6. Cough ongoing but not changed. Non-productive. No fevers. CXR with mild bronchial thickening but no PNA. IgG low at 240, give IVIG today. Overall improving.     6.FEN/Renal:   - Cr mildly elevated. Recommend increased oral intake.   - Lytes stable.      7. GI:    #intermittent nausea - compazine prn and medical cannibis.  #Abdominal Pain secondary to cancer. He is in remission, but still has this pain.        8. Dental: He was seen by a dentist as described in previous notes.  The identified no infection but no repair is possible for at least the next 2 months because of scheduling.  There is a possible increased risk of infection at that site and therefore he will need prophylactic antibiotics when neutropenic.      RTC: 11/19 weeks for study visit. Sooner prn.     I spent 20 minutes in the care of this patient today, which included time necessary for preparation for the visit, obtaining history, ordering medications/tests/procedures as medically indicated, review of pertinent medical literature, counseling of the patient, communication of recommendations to the care team, and documentation time.      Topher Haro PA-C  [phone]           Again, thank you for allowing me to participate in the care of your patient.        Sincerely,        BMT Advanced Practice Provider

## 2021-10-26 NOTE — LETTER
10/26/2021         RE: Juvenal Blake  [address] [place] [address]        Dear Colleague,    Thank you for referring your patient, Juvenal Blake, to the [facility] BLOOD AND MARROW TRANSPLANT PROGRAM [place]. Please see a copy of my visit note below.    Infusion Nursing Note:  Juvenal Blake presents today for IVIG.    Patient seen by provider today: Yes: Topher Haro   present during visit today: Not Applicable.    Note: Labs monitored. Signed in Supportive plan.      Intravenous Access:  Implanted Port.     Treatment Conditions:  Premedications: 650mg tylenol, 50 mg benadryl, and 100 mg solu-cortef  given 30 min prior to IVIG. Pre-flush of 250 mL given prior to IVIG. IVIG given and increased q15min as ordered. VSS at each 15 min check. Max dose 3.5 ml/kg/hr. Pt tolerated well.     Post Infusion Assessment:  Patient tolerated infusion without incident.   Port deaccessed after infusion.    Discharge Plan:   Patient discharged in stable condition accompanied by: self.      Sarah Askew RN                          Again, thank you for allowing me to participate in the care of your patient.        Sincerely,        [facility]

## 2021-10-28 LAB — CULTURE HARVEST COMPLETE DATE: NORMAL

## 2021-10-29 LAB — INTERPRETATION: NORMAL

## 2021-11-09 LAB — INTERPRETATION: NORMAL

## 2021-11-13 ENCOUNTER — MYC MEDICAL ADVICE (OUTPATIENT)
Dept: TRANSPLANT | Facility: CLINIC | Age: 56
End: 2021-11-13
Payer: COMMERCIAL

## 2021-11-13 DIAGNOSIS — C82.08 FOLLICULAR LYMPHOMA GRADE I, LYMPH NODES OF MULTIPLE SITES (H): ICD-10-CM

## 2021-11-15 RX ORDER — FLUCONAZOLE 100 MG/1
100 TABLET ORAL DAILY
Qty: 30 TABLET | Refills: 0 | Status: SHIPPED | OUTPATIENT
Start: 2021-11-15 | End: 2021-11-19

## 2021-11-16 LAB
CULTURE HARVEST COMPLETE DATE: NORMAL
CULTURE HARVEST COMPLETE DATE: NORMAL

## 2021-11-18 DIAGNOSIS — C82.08 FOLLICULAR LYMPHOMA GRADE I, LYMPH NODES OF MULTIPLE SITES (H): Primary | ICD-10-CM

## 2021-11-19 ENCOUNTER — ONCOLOGY VISIT (OUTPATIENT)
Dept: TRANSPLANT | Facility: CLINIC | Age: 56
End: 2021-11-19
Attending: PHYSICIAN ASSISTANT
Payer: COMMERCIAL

## 2021-11-19 ENCOUNTER — APPOINTMENT (OUTPATIENT)
Dept: LAB | Facility: CLINIC | Age: 56
End: 2021-11-19
Attending: PHYSICIAN ASSISTANT
Payer: COMMERCIAL

## 2021-11-19 VITALS
BODY MASS INDEX: 37.28 KG/M2 | HEART RATE: 85 BPM | DIASTOLIC BLOOD PRESSURE: 74 MMHG | TEMPERATURE: 98.4 F | WEIGHT: 240.3 LBS | OXYGEN SATURATION: 97 % | RESPIRATION RATE: 16 BRPM | SYSTOLIC BLOOD PRESSURE: 109 MMHG

## 2021-11-19 DIAGNOSIS — C82.08 FOLLICULAR LYMPHOMA GRADE I, LYMPH NODES OF MULTIPLE SITES (H): ICD-10-CM

## 2021-11-19 DIAGNOSIS — C91.00 ALL (ACUTE LYMPHOBLASTIC LEUKEMIA) (H): Primary | ICD-10-CM

## 2021-11-19 DIAGNOSIS — Z94.81 STATUS POST BONE MARROW TRANSPLANT (H): ICD-10-CM

## 2021-11-19 DIAGNOSIS — B34.8 RHINOVIRUS INFECTION: ICD-10-CM

## 2021-11-19 LAB
ALBUMIN SERPL-MCNC: 3.7 G/DL (ref 3.4–5)
ALP SERPL-CCNC: 96 U/L (ref 40–150)
ALT SERPL W P-5'-P-CCNC: 61 U/L (ref 0–70)
ANION GAP SERPL CALCULATED.3IONS-SCNC: 8 MMOL/L (ref 3–14)
AST SERPL W P-5'-P-CCNC: 30 U/L (ref 0–45)
BASOPHILS # BLD AUTO: 0 10E3/UL (ref 0–0.2)
BASOPHILS NFR BLD AUTO: 0 %
BILIRUB DIRECT SERPL-MCNC: 0.2 MG/DL (ref 0–0.2)
BILIRUB SERPL-MCNC: 0.4 MG/DL (ref 0.2–1.3)
BILIRUB SERPL-MCNC: 0.4 MG/DL (ref 0.2–1.3)
BUN SERPL-MCNC: 16 MG/DL (ref 7–30)
CALCIUM SERPL-MCNC: 8.9 MG/DL (ref 8.5–10.1)
CHLORIDE BLD-SCNC: 107 MMOL/L (ref 94–109)
CO2 SERPL-SCNC: 28 MMOL/L (ref 20–32)
CREAT SERPL-MCNC: 1.23 MG/DL (ref 0.66–1.25)
EOSINOPHIL # BLD AUTO: 0.1 10E3/UL (ref 0–0.7)
EOSINOPHIL NFR BLD AUTO: 2 %
ERYTHROCYTE [DISTWIDTH] IN BLOOD BY AUTOMATED COUNT: 14.3 % (ref 10–15)
GFR SERPL CREATININE-BSD FRML MDRD: 64 ML/MIN/1.73M2
GLUCOSE BLD-MCNC: 116 MG/DL (ref 70–99)
HCT VFR BLD AUTO: 35.1 % (ref 40–53)
HGB BLD-MCNC: 12.1 G/DL (ref 13.3–17.7)
IGG SERPL-MCNC: 470 MG/DL (ref 610–1616)
IMM GRANULOCYTES # BLD: 0 10E3/UL
IMM GRANULOCYTES NFR BLD: 0 %
LDH SERPL L TO P-CCNC: 166 U/L (ref 85–227)
LYMPHOCYTES # BLD AUTO: 0.3 10E3/UL (ref 0.8–5.3)
LYMPHOCYTES NFR BLD AUTO: 7 %
MAGNESIUM SERPL-MCNC: 2 MG/DL (ref 1.6–2.3)
MCH RBC QN AUTO: 33.7 PG (ref 26.5–33)
MCHC RBC AUTO-ENTMCNC: 34.5 G/DL (ref 31.5–36.5)
MCV RBC AUTO: 98 FL (ref 78–100)
MONOCYTES # BLD AUTO: 0.5 10E3/UL (ref 0–1.3)
MONOCYTES NFR BLD AUTO: 10 %
NEUTROPHILS # BLD AUTO: 3.7 10E3/UL (ref 1.6–8.3)
NEUTROPHILS NFR BLD AUTO: 81 %
NRBC # BLD AUTO: 0 10E3/UL
NRBC BLD AUTO-RTO: 0 /100
PHOSPHATE SERPL-MCNC: 2.6 MG/DL (ref 2.5–4.5)
PLATELET # BLD AUTO: 121 10E3/UL (ref 150–450)
POTASSIUM BLD-SCNC: 3.9 MMOL/L (ref 3.4–5.3)
PROT SERPL-MCNC: 6.7 G/DL (ref 6.8–8.8)
RBC # BLD AUTO: 3.59 10E6/UL (ref 4.4–5.9)
SODIUM SERPL-SCNC: 143 MMOL/L (ref 133–144)
WBC # BLD AUTO: 4.6 10E3/UL (ref 4–11)

## 2021-11-19 PROCEDURE — 36591 DRAW BLOOD OFF VENOUS DEVICE: CPT

## 2021-11-19 PROCEDURE — 99214 OFFICE O/P EST MOD 30 MIN: CPT

## 2021-11-19 PROCEDURE — 83735 ASSAY OF MAGNESIUM: CPT

## 2021-11-19 PROCEDURE — 250N000011 HC RX IP 250 OP 636: Performed by: PHYSICIAN ASSISTANT

## 2021-11-19 PROCEDURE — 82784 ASSAY IGA/IGD/IGG/IGM EACH: CPT

## 2021-11-19 PROCEDURE — 83615 LACTATE (LD) (LDH) ENZYME: CPT

## 2021-11-19 PROCEDURE — 82040 ASSAY OF SERUM ALBUMIN: CPT

## 2021-11-19 PROCEDURE — 36415 COLL VENOUS BLD VENIPUNCTURE: CPT

## 2021-11-19 PROCEDURE — 84100 ASSAY OF PHOSPHORUS: CPT

## 2021-11-19 PROCEDURE — 85004 AUTOMATED DIFF WBC COUNT: CPT

## 2021-11-19 PROCEDURE — G0463 HOSPITAL OUTPT CLINIC VISIT: HCPCS

## 2021-11-19 PROCEDURE — 82248 BILIRUBIN DIRECT: CPT

## 2021-11-19 RX ORDER — SULFAMETHOXAZOLE/TRIMETHOPRIM 800-160 MG
TABLET ORAL
Qty: 16 TABLET | Refills: 4 | Status: ON HOLD | OUTPATIENT
Start: 2021-11-19 | End: 2022-01-24

## 2021-11-19 RX ORDER — HEPARIN SODIUM (PORCINE) LOCK FLUSH IV SOLN 100 UNIT/ML 100 UNIT/ML
5 SOLUTION INTRAVENOUS ONCE
Status: COMPLETED | OUTPATIENT
Start: 2021-11-19 | End: 2021-11-19

## 2021-11-19 RX ADMIN — Medication 5 ML: at 08:57

## 2021-11-19 ASSESSMENT — PAIN SCALES - GENERAL: PAINLEVEL: NO PAIN (1)

## 2021-11-19 NOTE — NURSING NOTE
Chief Complaint   Patient presents with     Oncology Clinic Visit     Follicular lymphoma grade i     Labs drawn with vpt by rn.  Pt tolerated well.     Vern Trotter RN

## 2021-11-19 NOTE — NURSING NOTE
"Oncology Rooming Note    November 19, 2021 9:10 AM   Juvenal Blake is a 56 year old male who presents for:    Chief Complaint   Patient presents with     Oncology Clinic Visit     Follicular lymphoma grade i     Initial Vitals: /74 (BP Location: Right arm, Patient Position: Sitting, Cuff Size: Adult Regular)   Pulse 85   Temp 98.4  F (36.9  C) (Oral)   Resp 16   Wt 109 kg (240 lb 4.8 oz)   SpO2 97%   BMI 37.28 kg/m   Estimated body mass index is 37.28 kg/m  as calculated from the following:    Height as of 10/6/21: 1.71 m (5' 7.32\").    Weight as of this encounter: 109 kg (240 lb 4.8 oz). Body surface area is 2.28 meters squared.  No Pain (1) Comment: Data Unavailable   No LMP for male patient.  Allergies reviewed: Yes  Medications reviewed: Yes    Medications: Medication refills not needed today.  Pharmacy name entered into [employer]: [id number] - [place], [place] - [address] AT [facility]    Clinical concerns: None       Dagmar Delgado LPN            "

## 2021-11-19 NOTE — LETTER
Date:December 22, 2021      Provider requested that no letter be sent. Do not send.       [facility]

## 2021-11-19 NOTE — LETTER
11/19/2021         RE: Juvenal Blake  [address] [place] [address]        Dear Colleague,    Thank you for referring your patient, Juvenal Blake, to the [facility] BLOOD AND MARROW TRANSPLANT PROGRAM [place]. Please see a copy of my visit note below.    BMT Clinic Note      Juvenal Blake is a 55 year old male PMH significant for refractory NHL, undergoing  NK infusion. Admitted with rituxan (biosimilar) infusion reaction 6/24 and remained admitted through completion of lymphodepleting chemo and  NK cell infusion.  Day +144 after initiation . cycle 2, day 15 .     HPI:  Returns for follow up.  He is feeling good.  Chronic sinus drainage with cough, but not worse and no fevers.  No chest pain.  Some ongoing abdominal discomfort with any jostling or pushing on abdomen.  Eating/drinking and eliminating fine.  Up frequently at night to urinate, which is nothing new.  Has multiple med questions, today.      ROS: 8 point ROS negative except as above.      Physical Exam:     Blood pressure 109/74, pulse 85, temperature 98.4  F (36.9  C), temperature source Oral, resp. rate 16, weight 109 kg (240 lb 4.8 oz), SpO2 97 %.    General: NAD, alert, oriented   ENT: sclera anicteric, non injected.    Lungs: Breathing comfortably on room air, CTAB No rhonchi nor wheezing  Cardiovascular: RRR, no m/r/g  Abdominal/Rectal: normoactive BS, tender throughout, especially in upper abdomen  Lymphatics: 1+ edema to LE at sock line   Skin: no rash.   Neuro: non-focal  Access: PAC right chest     ECOG 1  11/19/2021     Labs:  Lab Results   Component Value Date    WBC 4.6 11/19/2021    ANEU 0.6 (L) 09/08/2021    HGB 12.1 (L) 11/19/2021    HCT 35.1 (L) 11/19/2021     (L) 11/19/2021     11/19/2021    POTASSIUM 3.9 11/19/2021    CHLORIDE 107 11/19/2021    CO2 28 11/19/2021     (H) 11/19/2021    BUN 16 11/19/2021    CR 1.23 11/19/2021    MAG 2.0 11/19/2021    INR 1.07 06/28/2021     BILITOTAL 0.4 11/19/2021    BILITOTAL 0.4 11/19/2021    AST 30 11/19/2021    ALT 61 11/19/2021    ALKPHOS 96 11/19/2021    PROTTOTAL 6.7 (L) 11/19/2021    ALBUMIN 3.7 11/19/2021       Assessment and Plan:   Juvenal Blake is a 55 year old male PMH significant for refractory NHL, undergoing  NK infusion. Admitted with rituxan (biosimilar) infusion reaction 6/24 and remained admitted through completion of lymphodepleting chemo and  NK cell infusion.      1. Follicular Lymphoma:   CR post 2 cycles   Post reaction to the bio similar rituximab and that will not be used again.    STOP ALLOPURINOL 11/19    2. HEME: Keep hgb >7g/dL, plt>10,000.   Mild anemia and thrombocytopenia secondary to chemotherapy/lymphoma. No transfusions needed.     3. CV: Cardiology cleared him to proceed with no further testing. The CT angiogram showed no lesion requiring stenting or bypass.    4. :   Post radical prostatectomy and bilateral lymph node dissection. November 15, 2017. Prostatic adenocarcinoma Maricel 4+3 = 7 with tertiary pattern 5. Tumor involves 45% of total surface area. Positive for extensive extraprostatic extension. Positive for bilateral seminal vesicle invasion. Multiple margins positive. Lymphovascular invasion not identified. Perineural invasion was noted. Lymph nodes negative. 3 were examined (2 right obturator and 1 left obturator). Completed radiation to the prostate fossa and pelvic lymph nodes at [facility] early May 2018. He received 4500 cGy in 25 fractions. He then had 2340 cGy boost in 13 fractions to the prostatic fossa, for a total dose of 6240 cGy in 38 treatment fractions delivered over 54 days. He did not receive hormonal therapy:    9/10 PSA: 0.71 - no concerns.      5. ID:  Proph: acyclovir 800mg bid through 1 year as shingles prophylaxis, Bactrim (for 6 months post therapy, then check T cell subsets to determine if he has ongoing need).  STOP FLUCONAZOLE 11/19.   - hx C diff  colitis   - s/p 3 covid shots; first two were pre- and booster was after; messaged Mara to ask if we are to resume full series, as was recently decided post cellular therapy.   - Rhinovirus+10/6. Cough ongoing but not changed. Non-productive. No fevers. CXR with mild bronchial thickening but no PNA. IgG low at 240, got IVIG last month.  IgG pending 11/19.     6.FEN/Renal:   - Cr mildly elevated.    - Lytes stable.      7. GI:    #intermittent nausea -  medical cannibis.  #Abdominal Pain secondary to cancer. He is in remission, but still has this pain.        8. Dental: s/p root canal; well healed and mouth feels good.       PET 12/20; [facility] 12/23. May require further IVIG.  May require repeat covid vaccination series.     I spent 30 minutes in the care of this patient today, which included time necessary for preparation for the visit, obtaining history, ordering medications/tests/procedures as medically indicated, review of pertinent medical literature, counseling of the patient, communication of recommendations to the care team, and documentation time.      Penny Garcia PA-C  11/19/2021               Again, thank you for allowing me to participate in the care of your patient.        Sincerely,        BMT Advanced Practice Provider

## 2021-11-19 NOTE — PROGRESS NOTES
BMT Clinic Note      Juvenal Blake is a 55 year old male PMH significant for refractory NHL, undergoing  NK infusion. Admitted with rituxan (biosimilar) infusion reaction 6/24 and remained admitted through completion of lymphodepleting chemo and  NK cell infusion.  Day +144 after initiation . cycle 2, day 15 .     HPI:  Returns for follow up.  He is feeling good.  Chronic sinus drainage with cough, but not worse and no fevers.  No chest pain.  Some ongoing abdominal discomfort with any jostling or pushing on abdomen.  Eating/drinking and eliminating fine.  Up frequently at night to urinate, which is nothing new.  Has multiple med questions, today.      ROS: 8 point ROS negative except as above.      Physical Exam:     Blood pressure 109/74, pulse 85, temperature 98.4  F (36.9  C), temperature source Oral, resp. rate 16, weight 109 kg (240 lb 4.8 oz), SpO2 97 %.    General: NAD, alert, oriented   ENT: sclera anicteric, non injected.    Lungs: Breathing comfortably on room air, CTAB No rhonchi nor wheezing  Cardiovascular: RRR, no m/r/g  Abdominal/Rectal: normoactive BS, tender throughout, especially in upper abdomen  Lymphatics: 1+ edema to LE at sock line   Skin: no rash.   Neuro: non-focal  Access: PAC right chest     ECOG 1  11/19/2021     Labs:  Lab Results   Component Value Date    WBC 4.6 11/19/2021    ANEU 0.6 (L) 09/08/2021    HGB 12.1 (L) 11/19/2021    HCT 35.1 (L) 11/19/2021     (L) 11/19/2021     11/19/2021    POTASSIUM 3.9 11/19/2021    CHLORIDE 107 11/19/2021    CO2 28 11/19/2021     (H) 11/19/2021    BUN 16 11/19/2021    CR 1.23 11/19/2021    MAG 2.0 11/19/2021    INR 1.07 06/28/2021    BILITOTAL 0.4 11/19/2021    BILITOTAL 0.4 11/19/2021    AST 30 11/19/2021    ALT 61 11/19/2021    ALKPHOS 96 11/19/2021    PROTTOTAL 6.7 (L) 11/19/2021    ALBUMIN 3.7 11/19/2021       Assessment and Plan:   Juvenal Blake is a 55 year old male PMH significant for refractory NHL,  undergoing  NK infusion. Admitted with rituxan (biosimilar) infusion reaction 6/24 and remained admitted through completion of lymphodepleting chemo and  NK cell infusion.      1. Follicular Lymphoma:   CR post 2 cycles   Post reaction to the bio similar rituximab and that will not be used again.    STOP ALLOPURINOL 11/19    2. HEME: Keep hgb >7g/dL, plt>10,000.   Mild anemia and thrombocytopenia secondary to chemotherapy/lymphoma. No transfusions needed.     3. CV: Cardiology cleared him to proceed with no further testing. The CT angiogram showed no lesion requiring stenting or bypass.    4. :   Post radical prostatectomy and bilateral lymph node dissection. November 15, 2017. Prostatic adenocarcinoma Maricel 4+3 = 7 with tertiary pattern 5. Tumor involves 45% of total surface area. Positive for extensive extraprostatic extension. Positive for bilateral seminal vesicle invasion. Multiple margins positive. Lymphovascular invasion not identified. Perineural invasion was noted. Lymph nodes negative. 3 were examined (2 right obturator and 1 left obturator). Completed radiation to the prostate fossa and pelvic lymph nodes at [facility] early May 2018. He received 4500 cGy in 25 fractions. He then had 2340 cGy boost in 13 fractions to the prostatic fossa, for a total dose of 6240 cGy in 38 treatment fractions delivered over 54 days. He did not receive hormonal therapy:    9/10 PSA: 0.71 - no concerns.      5. ID:  Proph: acyclovir 800mg bid through 1 year as shingles prophylaxis, Bactrim (for 6 months post therapy, then check T cell subsets to determine if he has ongoing need).  STOP FLUCONAZOLE 11/19.   - hx C diff colitis   - s/p 3 covid shots; first two were pre- and booster was after; messaged Mara to ask if we are to resume full series, as was recently decided post cellular therapy.   - Rhinovirus+10/6. Cough ongoing but not changed. Non-productive. No fevers. CXR with mild  bronchial thickening but no PNA. IgG low at 240, got IVIG last month.  IgG pending 11/19.     6.FEN/Renal:   - Cr mildly elevated.    - Lytes stable.      7. GI:    #intermittent nausea -  medical cannibis.  #Abdominal Pain secondary to cancer. He is in remission, but still has this pain.        8. Dental: s/p root canal; well healed and mouth feels good.       PET 12/20; [place] 12/23. May require further IVIG.  May require repeat covid vaccination series.     I spent 30 minutes in the care of this patient today, which included time necessary for preparation for the visit, obtaining history, ordering medications/tests/procedures as medically indicated, review of pertinent medical literature, counseling of the patient, communication of recommendations to the care team, and documentation time.      Penny Garcia PA-C  11/19/2021

## 2021-11-23 DIAGNOSIS — C82.08 FOLLICULAR LYMPHOMA GRADE I, LYMPH NODES OF MULTIPLE SITES (H): Primary | ICD-10-CM

## 2021-11-24 ENCOUNTER — APPOINTMENT (OUTPATIENT)
Dept: LAB | Facility: CLINIC | Age: 56
End: 2021-11-24
Payer: COMMERCIAL

## 2021-11-24 ENCOUNTER — ONCOLOGY VISIT (OUTPATIENT)
Dept: TRANSPLANT | Facility: CLINIC | Age: 56
End: 2021-11-24
Payer: COMMERCIAL

## 2021-11-24 VITALS
BODY MASS INDEX: 36.95 KG/M2 | SYSTOLIC BLOOD PRESSURE: 133 MMHG | OXYGEN SATURATION: 100 % | WEIGHT: 238.2 LBS | TEMPERATURE: 97.2 F | RESPIRATION RATE: 16 BRPM | DIASTOLIC BLOOD PRESSURE: 73 MMHG | HEART RATE: 62 BPM

## 2021-11-24 DIAGNOSIS — C82.08 FOLLICULAR LYMPHOMA GRADE I, LYMPH NODES OF MULTIPLE SITES (H): ICD-10-CM

## 2021-11-24 LAB
ALBUMIN SERPL-MCNC: 3.9 G/DL (ref 3.4–5)
ALP SERPL-CCNC: 98 U/L (ref 40–150)
ALT SERPL W P-5'-P-CCNC: 53 U/L (ref 0–70)
ANION GAP SERPL CALCULATED.3IONS-SCNC: 7 MMOL/L (ref 3–14)
AST SERPL W P-5'-P-CCNC: 24 U/L (ref 0–45)
BASOPHILS # BLD AUTO: 0 10E3/UL (ref 0–0.2)
BASOPHILS NFR BLD AUTO: 0 %
BILIRUB SERPL-MCNC: 0.5 MG/DL (ref 0.2–1.3)
BUN SERPL-MCNC: 15 MG/DL (ref 7–30)
CALCIUM SERPL-MCNC: 9.5 MG/DL (ref 8.5–10.1)
CHLORIDE BLD-SCNC: 107 MMOL/L (ref 94–109)
CO2 SERPL-SCNC: 25 MMOL/L (ref 20–32)
CREAT SERPL-MCNC: 1.44 MG/DL (ref 0.66–1.25)
EOSINOPHIL # BLD AUTO: 0.1 10E3/UL (ref 0–0.7)
EOSINOPHIL NFR BLD AUTO: 2 %
ERYTHROCYTE [DISTWIDTH] IN BLOOD BY AUTOMATED COUNT: 14 % (ref 10–15)
GFR SERPL CREATININE-BSD FRML MDRD: 53 ML/MIN/1.73M2
GLUCOSE BLD-MCNC: 101 MG/DL (ref 70–99)
HCT VFR BLD AUTO: 34 % (ref 40–53)
HGB BLD-MCNC: 12.1 G/DL (ref 13.3–17.7)
IMM GRANULOCYTES # BLD: 0 10E3/UL
IMM GRANULOCYTES NFR BLD: 0 %
LDH SERPL L TO P-CCNC: 176 U/L (ref 85–227)
LYMPHOCYTES # BLD AUTO: 0.4 10E3/UL (ref 0.8–5.3)
LYMPHOCYTES NFR BLD AUTO: 9 %
MCH RBC QN AUTO: 34.6 PG (ref 26.5–33)
MCHC RBC AUTO-ENTMCNC: 35.6 G/DL (ref 31.5–36.5)
MCV RBC AUTO: 97 FL (ref 78–100)
MONOCYTES # BLD AUTO: 0.6 10E3/UL (ref 0–1.3)
MONOCYTES NFR BLD AUTO: 13 %
NEUTROPHILS # BLD AUTO: 3.5 10E3/UL (ref 1.6–8.3)
NEUTROPHILS NFR BLD AUTO: 76 %
NRBC # BLD AUTO: 0 10E3/UL
NRBC BLD AUTO-RTO: 0 /100
PLATELET # BLD AUTO: 140 10E3/UL (ref 150–450)
POTASSIUM BLD-SCNC: 4 MMOL/L (ref 3.4–5.3)
PROT SERPL-MCNC: 6.8 G/DL (ref 6.8–8.8)
RBC # BLD AUTO: 3.5 10E6/UL (ref 4.4–5.9)
SODIUM SERPL-SCNC: 139 MMOL/L (ref 133–144)
WBC # BLD AUTO: 4.7 10E3/UL (ref 4–11)

## 2021-11-24 PROCEDURE — 83615 LACTATE (LD) (LDH) ENZYME: CPT

## 2021-11-24 PROCEDURE — 36415 COLL VENOUS BLD VENIPUNCTURE: CPT

## 2021-11-24 PROCEDURE — 82040 ASSAY OF SERUM ALBUMIN: CPT

## 2021-11-24 PROCEDURE — 85025 COMPLETE CBC W/AUTO DIFF WBC: CPT

## 2021-11-24 PROCEDURE — G0463 HOSPITAL OUTPT CLINIC VISIT: HCPCS

## 2021-11-24 PROCEDURE — 99215 OFFICE O/P EST HI 40 MIN: CPT

## 2021-11-24 RX ORDER — TOLTERODINE 4 MG/1
4 CAPSULE, EXTENDED RELEASE ORAL DAILY
Qty: 90 CAPSULE | Refills: 11 | Status: SHIPPED | OUTPATIENT
Start: 2021-11-24

## 2021-11-24 ASSESSMENT — PAIN SCALES - GENERAL: PAINLEVEL: NO PAIN (1)

## 2021-11-24 NOTE — NURSING NOTE
Chief Complaint   Patient presents with     Blood Draw     Labs drawn via  by rn in lab. VS taken.     Labs collected from venipuncture by RN. Vitals taken. Checked in for appointment(s).    Cole Valdez RN

## 2021-11-24 NOTE — NURSING NOTE
"Oncology Rooming Note    November 24, 2021 2:32 PM   Juvenal Blake is a 56 year old male who presents for:    Chief Complaint   Patient presents with     Blood Draw     Labs drawn via  by rn in lab. VS taken.     RECHECK     provider visit r/t follicular lymphoma     Initial Vitals: /73 (BP Location: Right arm, Patient Position: Sitting, Cuff Size: Adult Large)   Pulse 62   Temp 97.2  F (36.2  C) (Oral)   Resp 16   Wt 108 kg (238 lb 3.2 oz)   SpO2 100%   BMI 36.95 kg/m   Estimated body mass index is 36.95 kg/m  as calculated from the following:    Height as of 10/6/21: 1.71 m (5' 7.32\").    Weight as of this encounter: 108 kg (238 lb 3.2 oz). Body surface area is 2.26 meters squared.  No Pain (1) Comment: Data Unavailable   No LMP for male patient.  Allergies reviewed: Yes  Medications reviewed: Yes    Medications: MEDICATION REFILLS NEEDED TODAY. Provider was notified.     Please send refill of tolterodine to [facility] listed below.     Pharmacy name entered into [email or web address]: [facility] #[id number] - [place], [place] - [address] AT [facility]      Sarah Askew RN              "

## 2021-11-24 NOTE — LETTER
Date:December 27, 2021      Provider requested that no letter be sent. Do not send.       [facility]

## 2021-11-24 NOTE — LETTER
2021         RE: Juvenal Blake  [address] [place] [address]        Dear Colleague,    Thank you for referring your patient, Juvenal Blake, to the [facility] BLOOD AND MARROW TRANSPLANT PROGRAM [place]. Please see a copy of my visit note below.    BMT Clinic Note      Juvenal Blake is a 55 year old male PMH significant for refractory NHL, undergoing  NK infusion. Admitted with rituxan (biosimilar) infusion reaction  and remained admitted through completion of lymphodepleting chemo and  NK cell infusion.  Day + 149  after initiation . cycle 2, day 15 .     HPI:  Called with enlarged lymph node in the groin. No other symptoms, just anxious about what that means.    ROS: 14 point ROS negative except as above.      Physical Exam:     /73 (BP Location: Right arm, Patient Position: Sitting, Cuff Size: Adult Large)   Pulse 62   Temp 97.2  F (36.2  C) (Oral)   Resp 16   Wt 108 kg (238 lb 3.2 oz)   SpO2 100%   BMI 36.95 kg/m    GA: NAD. Appears as stated age.  HEENT: PERRL, OP Clear  Neck: Supple, no JVD  CV: RRR, no mrg  Lungs: CTAB, no wheezes  Abd: Soft, nt, nd  Lymph: No cervical LAD, no HSM. 2x2 mobile LN in the right groin  Ext: No edema. Warm  Neuro: AAO, moving all ext. Symmetric face  Skin: warm, no rashes  Psyc: Appropriate and cooperative  Access: none    ECO    Labs:  Lab Results   Component Value Date    WBC 4.7 2021    ANEU 0.6 (L) 2021    HGB 12.1 (L) 2021    HCT 34.0 (L) 2021     (L) 2021     2021    POTASSIUM 4.0 2021    CHLORIDE 107 2021    CO2 25 2021     (H) 2021    BUN 15 2021    CR 1.44 (H) 2021    MAG 2.0 2021    INR 1.07 2021    BILITOTAL 0.5 2021    AST 24 2021    ALT 53 2021    ALKPHOS 98 2021    PROTTOTAL 6.8 2021    ALBUMIN 3.9 2021       Assessment and Plan:   Juvenal Blake is a 55  "year old male PMH significant for refractory NHL, undergoing  NK infusion. Admitted with rituxan (biosimilar) infusion reaction 6/24 and remained admitted through completion of lymphodepleting chemo and  NK cell infusion.      1. Follicular Lymphoma:   - Previous notes noted \"CR\" but on 9/21, his right inguinal LN has an SUV of 5 making it a [place]. He has no sx and his LDH is normal. We will get CT to assess disease burden and I reassured him.    2. HEME: Keep hgb >7g/dL, plt>10,000.   Mild anemia and thrombocytopenia secondary to chemotherapy/lymphoma. No transfusions needed.     3. CV: Cardiology cleared him to proceed with no further testing. The CT angiogram showed no lesion requiring stenting or bypass.    4. :   Post radical prostatectomy and bilateral lymph node dissection. November 15, 2017. Prostatic adenocarcinoma Alpha 4+3 = 7 with tertiary pattern 5. Tumor involves 45% of total surface area. Positive for extensive extraprostatic extension. Positive for bilateral seminal vesicle invasion. Multiple margins positive. Lymphovascular invasion not identified. Perineural invasion was noted. Lymph nodes negative. 3 were examined (2 right obturator and 1 left obturator). Completed radiation to the prostate fossa and pelvic lymph nodes at [facility] early May 2018. He received 4500 cGy in 25 fractions. He then had 2340 cGy boost in 13 fractions to the prostatic fossa, for a total dose of 6240 cGy in 38 treatment fractions delivered over 54 days. He did not receive hormonal therapy:    9/10 PSA: 0.71 - no concerns.   - renewed his detrol     5. ID:  Proph: acyclovir 800mg bid through 1 year as shingles prophylaxis, Bactrim (for 6 months post therapy, then check T cell subsets to determine if he has ongoing need).  - hx C diff colitis   - s/p 3 covid shots; first two were pre- and booster was after; messaged Mara to ask if we are to resume full series, as was recently decided post " cellular therapy.   - Rhinovirus+10/6. Cough ongoing but not changed. Non-productive. No fevers. CXR with mild bronchial thickening but no PNA. IgG low at 240, got IVIG last month. No real indication for continued IVIG replacement.    6.FEN/Renal:   - Cr mildly elevated.    - Lytes stable.      7. GI:    #intermittent nausea -  medical cannibis.  #Abdominal Pain secondary to cancer. He is in remission, but still has this pain.      8. Dental: s/p root canal; well healed and mouth feels good.     Plan: CT CAP. RTC in 1 week to discuss.      Again, thank you for allowing me to participate in the care of your patient.        Sincerely,        BMT [facility]

## 2021-11-25 NOTE — PROGRESS NOTES
BMT Clinic Note      Juvenal Blake is a 55 year old male PMH significant for refractory NHL, undergoing  NK infusion. Admitted with rituxan (biosimilar) infusion reaction  and remained admitted through completion of lymphodepleting chemo and  NK cell infusion.  Day + 149  after initiation . cycle 2, day 15 .     HPI:  Called with enlarged lymph node in the groin. No other symptoms, just anxious about what that means.    ROS: 14 point ROS negative except as above.      Physical Exam:     /73 (BP Location: Right arm, Patient Position: Sitting, Cuff Size: Adult Large)   Pulse 62   Temp 97.2  F (36.2  C) (Oral)   Resp 16   Wt 108 kg (238 lb 3.2 oz)   SpO2 100%   BMI 36.95 kg/m    GA: NAD. Appears as stated age.  HEENT: PERRL, OP Clear  Neck: Supple, no JVD  CV: RRR, no mrg  Lungs: CTAB, no wheezes  Abd: Soft, nt, nd  Lymph: No cervical LAD, no HSM. 2x2 mobile LN in the right groin  Ext: No edema. Warm  Neuro: AAO, moving all ext. Symmetric face  Skin: warm, no rashes  Psyc: Appropriate and cooperative  Access: none    ECO    Labs:  Lab Results   Component Value Date    WBC 4.7 2021    ANEU 0.6 (L) 2021    HGB 12.1 (L) 2021    HCT 34.0 (L) 2021     (L) 2021     2021    POTASSIUM 4.0 2021    CHLORIDE 107 2021    CO2 25 2021     (H) 2021    BUN 15 2021    CR 1.44 (H) 2021    MAG 2.0 2021    INR 1.07 2021    BILITOTAL 0.5 2021    AST 24 2021    ALT 53 2021    ALKPHOS 98 2021    PROTTOTAL 6.8 2021    ALBUMIN 3.9 2021       Assessment and Plan:   Juvenal Blake is a 55 year old male PMH significant for refractory NHL, undergoing  NK infusion. Admitted with rituxan (biosimilar) infusion reaction  and remained admitted through completion of lymphodepleting chemo and  NK cell infusion.      1. Follicular Lymphoma:   - Previous notes  "noted \"CR\" but on 9/21, his right inguinal LN has an SUV of 5 making it a TX. He has no sx and his LDH is normal. We will get CT to assess disease burden and I reassured him.    2. HEME: Keep hgb >7g/dL, plt>10,000.   Mild anemia and thrombocytopenia secondary to chemotherapy/lymphoma. No transfusions needed.     3. CV: Cardiology cleared him to proceed with no further testing. The CT angiogram showed no lesion requiring stenting or bypass.    4. :   Post radical prostatectomy and bilateral lymph node dissection. November 15, 2017. Prostatic adenocarcinoma [place] 4+3 = 7 with tertiary pattern 5. Tumor involves 45% of total surface area. Positive for extensive extraprostatic extension. Positive for bilateral seminal vesicle invasion. Multiple margins positive. Lymphovascular invasion not identified. Perineural invasion was noted. Lymph nodes negative. 3 were examined (2 right obturator and 1 left obturator). Completed radiation to the prostate fossa and pelvic lymph nodes at [facility] early May 2018. He received 4500 cGy in 25 fractions. He then had 2340 cGy boost in 13 fractions to the prostatic fossa, for a total dose of 6240 cGy in 38 treatment fractions delivered over 54 days. He did not receive hormonal therapy:    9/10 PSA: 0.71 - no concerns.   - renewed his detrol     5. ID:  Proph: acyclovir 800mg bid through 1 year as shingles prophylaxis, Bactrim (for 6 months post therapy, then check T cell subsets to determine if he has ongoing need).  - hx C diff colitis   - s/p 3 covid shots; first two were pre- and booster was after; messaged Mara to ask if we are to resume full series, as was recently decided post cellular therapy.   - Rhinovirus+10/6. Cough ongoing but not changed. Non-productive. No fevers. CXR with mild bronchial thickening but no PNA. IgG low at 240, got IVIG last month. No real indication for continued IVIG replacement.    6.FEN/Renal:   - Cr mildly elevated.    - Lytes " stable.      7. GI:    #intermittent nausea -  medical cannibis.  #Abdominal Pain secondary to cancer. He is in remission, but still has this pain.      8. Dental: s/p root canal; well healed and mouth feels good.     Plan: CT CAP. RTC in 1 week to discuss.

## 2021-11-27 ENCOUNTER — ANCILLARY PROCEDURE (OUTPATIENT)
Dept: CT IMAGING | Facility: CLINIC | Age: 56
End: 2021-11-27
Attending: INTERNAL MEDICINE
Payer: COMMERCIAL

## 2021-11-27 DIAGNOSIS — C82.08 FOLLICULAR LYMPHOMA GRADE I, LYMPH NODES OF MULTIPLE SITES (H): ICD-10-CM

## 2021-11-27 PROCEDURE — 74177 CT ABD & PELVIS W/CONTRAST: CPT | Performed by: RADIOLOGY

## 2021-11-27 PROCEDURE — 71260 CT THORAX DX C+: CPT | Performed by: RADIOLOGY

## 2021-11-27 RX ORDER — IOPAMIDOL 755 MG/ML
135 INJECTION, SOLUTION INTRAVASCULAR ONCE
Status: COMPLETED | OUTPATIENT
Start: 2021-11-27 | End: 2021-11-27

## 2021-11-27 RX ADMIN — IOPAMIDOL 135 ML: 755 INJECTION, SOLUTION INTRAVASCULAR at 09:29

## 2021-11-29 ENCOUNTER — ONCOLOGY VISIT (OUTPATIENT)
Dept: TRANSPLANT | Facility: CLINIC | Age: 56
End: 2021-11-29
Attending: INTERNAL MEDICINE
Payer: COMMERCIAL

## 2021-11-29 VITALS
HEART RATE: 73 BPM | BODY MASS INDEX: 37.83 KG/M2 | TEMPERATURE: 98.3 F | WEIGHT: 243.9 LBS | DIASTOLIC BLOOD PRESSURE: 74 MMHG | SYSTOLIC BLOOD PRESSURE: 135 MMHG | OXYGEN SATURATION: 96 % | RESPIRATION RATE: 18 BRPM

## 2021-11-29 DIAGNOSIS — C82.08 FOLLICULAR LYMPHOMA GRADE I, LYMPH NODES OF MULTIPLE SITES (H): Primary | ICD-10-CM

## 2021-11-29 PROCEDURE — G0463 HOSPITAL OUTPT CLINIC VISIT: HCPCS

## 2021-11-29 PROCEDURE — 99215 OFFICE O/P EST HI 40 MIN: CPT

## 2021-11-29 ASSESSMENT — PAIN SCALES - GENERAL: PAINLEVEL: NO PAIN (0)

## 2021-11-29 NOTE — PROGRESS NOTES
BMT Clinic Note      Juvenal Blake is a 55 year old male PMH significant for refractory NHL, undergoing  NK infusion. Admitted with rituxan (biosimilar) infusion reaction  and remained admitted through completion of lymphodepleting chemo and  NK cell infusion.  Day + 154  after initiation      HPI: Comes in to follow up on scans. He still feels well and is asymptomatic.    ROS: 14 point ROS negative except as above.      Physical Exam:     /74   Pulse 73   Temp 98.3  F (36.8  C) (Oral)   Resp 18   Wt 110.6 kg (243 lb 14.4 oz)   SpO2 96%   BMI 37.83 kg/m    GA: NAD. Appears as stated age.  HEENT: PERRL, OP Clear  Neck: Supple, no JVD  CV: RRR, no mrg  Lungs: CTAB, no wheezes  Abd: Soft, nt, nd  Lymph: No cervical LAD, no HSM. 2x2 mobile LN in the right groin  Ext: No edema. Warm  Neuro: AAO, moving all ext. Symmetric face  Skin: warm, no rashes  Psyc: Appropriate and cooperative  Access: none    ECO    Labs:  Lab Results   Component Value Date    WBC 4.7 2021    ANEU 0.6 (L) 2021    HGB 12.1 (L) 2021    HCT 34.0 (L) 2021     (L) 2021     2021    POTASSIUM 4.0 2021    CHLORIDE 107 2021    CO2 25 2021     (H) 2021    BUN 15 2021    CR 1.44 (H) 2021    MAG 2.0 2021    INR 1.07 2021    BILITOTAL 0.5 2021    AST 24 2021    ALT 53 2021    ALKPHOS 98 2021    PROTTOTAL 6.8 2021    ALBUMIN 3.9 2021       Assessment and Plan:   Juvenal Blake is a 55 year old male PMH significant for refractory NHL, undergoing  NK infusion. Admitted with rituxan (biosimilar) infusion reaction  and remained admitted through completion of lymphodepleting chemo and  NK cell infusion.      1. Follicular Lymphoma:   - relapsed after BR, [facility], NAM NK and now . He has had idelalisib as a bridge and responded.  - We discussed that his disease burden remains  "low and we have time to think through things. I basically discussed that we still have several options. I divided them into groups and discussed \"pills\" that he would take for a long time such as R2, Tazemetostat, idelalisib/duvelisib/copanlisib vs \"one time\" cellular therapies. I discussed 2 in particular: our [id number] which is an autologous CART with a switch molecule [id number] and Yescarta which is currently approved for the treatment of [place]. I discussed the results of Zuma 5 with him and went over the side effects of CRS and NT. I gave him the publicly available patient information to go over. CLBR has not been activated but we can send an IRB-approved consent to go over. He wanted to think about things and perhaps meet with Dr. Terry to decide.    2. HEME: Keep hgb >7g/dL, plt>10,000.   Mild anemia and thrombocytopenia secondary to chemotherapy/lymphoma. No transfusions needed.     3. CV: Cardiology cleared him to proceed with no further testing. The CT angiogram showed no lesion requiring stenting or bypass.    4. :   Post radical prostatectomy and bilateral lymph node dissection. November 15, 2017. Prostatic adenocarcinoma [place] 4+3 = 7 with tertiary pattern 5. Tumor involves 45% of total surface area. Positive for extensive extraprostatic extension. Positive for bilateral seminal vesicle invasion. Multiple margins positive. Lymphovascular invasion not identified. Perineural invasion was noted. Lymph nodes negative. 3 were examined (2 right obturator and 1 left obturator). Completed radiation to the prostate fossa and pelvic lymph nodes at [facility] early May 2018. He received 4500 cGy in 25 fractions. He then had 2340 cGy boost in 13 fractions to the prostatic fossa, for a total dose of 6240 cGy in 38 treatment fractions delivered over 54 days. He did not receive hormonal therapy:    9/10 PSA: 0.71 - no concerns.   - renewed his detrol     5. ID:  Proph: acyclovir 800mg bid through 1 year as " shingles prophylaxis, Bactrim (for 6 months post therapy, then check T cell subsets to determine if he has ongoing need).  - hx C diff colitis   - s/p 3 covid shots; first two were pre- and booster was after; messaged Mara to ask if we are to resume full series, as was recently decided post cellular therapy.   - Rhinovirus+10/6. Cough ongoing but not changed. Non-productive. No fevers. CXR with mild bronchial thickening but no PNA. IgG low at 240, got IVIG last month. No real indication for continued IVIG replacement.    6.FEN/Renal:   - Cr mildly elevated.    - Lytes stable.      7. GI:    #intermittent nausea -  medical cannibis.  #Abdominal Pain secondary to cancer. He is in remission, but still has this pain.      8. Dental: s/p root canal; well healed and mouth feels good.     RTC per primary BMT team.    40 minutes spent on the date of the encounter doing chart review, interpretation of results, patient visit, documentation and coordination of care.

## 2021-11-29 NOTE — LETTER
2021         RE: Juvenal Blake  [address] [place] [address]        Dear Colleague,    Thank you for referring your patient, Juvenal Blake, to the [facility] [place]. Please see a copy of my visit note below.    BMT Clinic Note      Juvenal Blake is a 55 year old male PMH significant for refractory NHL, undergoing  NK infusion. Admitted with rituxan (biosimilar) infusion reaction  and remained admitted through completion of lymphodepleting chemo and  NK cell infusion.  Day + 154  after initiation      HPI: Comes in to follow up on scans. He still feels well and is asymptomatic.    ROS: 14 point ROS negative except as above.      Physical Exam:     /74   Pulse 73   Temp 98.3  F (36.8  C) (Oral)   Resp 18   Wt 110.6 kg (243 lb 14.4 oz)   SpO2 96%   BMI 37.83 kg/m    GA: NAD. Appears as stated age.  HEENT: PERRL, OP Clear  Neck: Supple, no JVD  CV: RRR, no mrg  Lungs: CTAB, no wheezes  Abd: Soft, nt, nd  Lymph: No cervical LAD, no HSM. 2x2 mobile LN in the right groin  Ext: No edema. Warm  Neuro: AAO, moving all ext. Symmetric face  Skin: warm, no rashes  Psyc: Appropriate and cooperative  Access: none    ECO    Labs:  Lab Results   Component Value Date    WBC 4.7 2021    ANEU 0.6 (L) 2021    HGB 12.1 (L) 2021    HCT 34.0 (L) 2021     (L) 2021     2021    POTASSIUM 4.0 2021    CHLORIDE 107 2021    CO2 25 2021     (H) 2021    BUN 15 2021    CR 1.44 (H) 2021    MAG 2.0 2021    INR 1.07 2021    BILITOTAL 0.5 2021    AST 24 2021    ALT 53 2021    ALKPHOS 98 2021    PROTTOTAL 6.8 2021    ALBUMIN 3.9 2021       Assessment and Plan:   Juvenal Blake is a 55 year old male PMH significant for refractory NHL, undergoing  NK infusion. Admitted with rituxan (biosimilar) infusion  "reaction 6/24 and remained admitted through completion of lymphodepleting chemo and  NK cell infusion.      1. Follicular Lymphoma:   - relapsed after BR, [facility], NAM NK and now . He has had idelalisib as a bridge and responded.  - We discussed that his disease burden remains low and we have time to think through things. I basically discussed that we still have several options. I divided them into groups and discussed \"pills\" that he would take for a long time such as R2, Tazemetostat, idelalisib/duvelisib/copanlisib vs \"one time\" cellular therapies. I discussed 2 in particular: our [id number] which is an autologous CART with a switch molecule [id number] and Yescarta which is currently approved for the treatment of [place]. I discussed the results of Zuma 5 with him and went over the side effects of CRS and NT. I gave him the publicly available patient information to go over. CLBR has not been activated but we can send an IRB-approved consent to go over. He wanted to think about things and perhaps meet with Dr. Terry to decide.    2. HEME: Keep hgb >7g/dL, plt>10,000.   Mild anemia and thrombocytopenia secondary to chemotherapy/lymphoma. No transfusions needed.     3. CV: Cardiology cleared him to proceed with no further testing. The CT angiogram showed no lesion requiring stenting or bypass.    4. :   Post radical prostatectomy and bilateral lymph node dissection. November 15, 2017. Prostatic adenocarcinoma [place] 4+3 = 7 with tertiary pattern 5. Tumor involves 45% of total surface area. Positive for extensive extraprostatic extension. Positive for bilateral seminal vesicle invasion. Multiple margins positive. Lymphovascular invasion not identified. Perineural invasion was noted. Lymph nodes negative. 3 were examined (2 right obturator and 1 left obturator). Completed radiation to the prostate fossa and pelvic lymph nodes at [facility] early May 2018. He received 4500 cGy in 25 fractions. He then had " 2340 cGy boost in 13 fractions to the prostatic fossa, for a total dose of 6240 cGy in 38 treatment fractions delivered over 54 days. He did not receive hormonal therapy:    9/10 PSA: 0.71 - no concerns.   - renewed his detrol     5. ID:  Proph: acyclovir 800mg bid through 1 year as shingles prophylaxis, Bactrim (for 6 months post therapy, then check T cell subsets to determine if he has ongoing need).  - hx C diff colitis   - s/p 3 covid shots; first two were pre- and booster was after; messaged Maar to ask if we are to resume full series, as was recently decided post cellular therapy.   - Rhinovirus+10/6. Cough ongoing but not changed. Non-productive. No fevers. CXR with mild bronchial thickening but no PNA. IgG low at 240, got IVIG last month. No real indication for continued IVIG replacement.    6.FEN/Renal:   - Cr mildly elevated.    - Lytes stable.      7. GI:    #intermittent nausea -  medical cannibis.  #Abdominal Pain secondary to cancer. He is in remission, but still has this pain.      8. Dental: s/p root canal; well healed and mouth feels good.     RTC per primary BMT team.    40 minutes spent on the date of the encounter doing chart review, interpretation of results, patient visit, documentation and coordination of care.        Again, thank you for allowing me to participate in the care of your patient.        Sincerely,        BMT DOM

## 2021-11-29 NOTE — NURSING NOTE
"Oncology Rooming Note    November 29, 2021 4:15 PM   Juvenal Blake is a 56 year old male who presents for:    Chief Complaint   Patient presents with     Oncology Clinic Visit     Follicular lymphoma grade i     Initial Vitals: /74   Pulse 73   Temp 98.3  F (36.8  C) (Oral)   Resp 18   Wt 110.6 kg (243 lb 14.4 oz)   SpO2 96%   BMI 37.83 kg/m   Estimated body mass index is 37.83 kg/m  as calculated from the following:    Height as of 10/6/21: 1.71 m (5' 7.32\").    Weight as of this encounter: 110.6 kg (243 lb 14.4 oz). Body surface area is 2.29 meters squared.  No Pain (0) Comment: Data Unavailable   No LMP for male patient.  Allergies reviewed: Yes  Medications reviewed: Yes    Medications: Medication refills not needed today.  Pharmacy name entered into [employer]: [id number] - [place], [place] - [address] AT [facility]    Clinical concerns: None       Dagmar Delgado LPN            "

## 2021-11-29 NOTE — LETTER
Date:December 27, 2021      Provider requested that no letter be sent. Do not send.       [facility]

## 2021-11-30 ENCOUNTER — TELEPHONE (OUTPATIENT)
Dept: TRANSPLANT | Facility: CLINIC | Age: 56
End: 2021-11-30
Payer: COMMERCIAL

## 2021-12-01 ENCOUNTER — ANCILLARY PROCEDURE (OUTPATIENT)
Dept: RADIOLOGY | Facility: CLINIC | Age: 56
End: 2021-12-01
Payer: COMMERCIAL

## 2021-12-01 DIAGNOSIS — C82.08 FOLLICULAR LYMPHOMA GRADE I, LYMPH NODES OF MULTIPLE SITES (H): Primary | ICD-10-CM

## 2021-12-01 DIAGNOSIS — C82.90 FOLLICULAR LYMPHOMA (H): ICD-10-CM

## 2021-12-01 DIAGNOSIS — Z01.818 EXAMINATION PRIOR TO CHEMOTHERAPY: ICD-10-CM

## 2021-12-01 DIAGNOSIS — Z86.2 PERSONAL HISTORY OF DISEASES OF BLOOD AND BLOOD-FORMING ORGANS: ICD-10-CM

## 2021-12-01 DIAGNOSIS — C85.90 NH LYMPHOMA (H): ICD-10-CM

## 2021-12-03 ENCOUNTER — LAB (OUTPATIENT)
Dept: LAB | Facility: CLINIC | Age: 56
End: 2021-12-03
Payer: COMMERCIAL

## 2021-12-03 ENCOUNTER — HOSPITAL ENCOUNTER (OUTPATIENT)
Dept: CARDIOLOGY | Facility: CLINIC | Age: 56
End: 2021-12-03
Payer: COMMERCIAL

## 2021-12-03 DIAGNOSIS — C82.08 FOLLICULAR LYMPHOMA GRADE I, LYMPH NODES OF MULTIPLE SITES (H): ICD-10-CM

## 2021-12-03 DIAGNOSIS — C82.08 FOLLICULAR LYMPHOMA GRADE I, LYMPH NODES OF MULTIPLE SITES (H): Primary | ICD-10-CM

## 2021-12-03 DIAGNOSIS — C85.90 NH LYMPHOMA (H): ICD-10-CM

## 2021-12-03 DIAGNOSIS — Z01.818 EXAMINATION PRIOR TO CHEMOTHERAPY: ICD-10-CM

## 2021-12-03 LAB
ABO/RH(D): NORMAL
ALBUMIN SERPL-MCNC: 3.7 G/DL (ref 3.4–5)
ALP SERPL-CCNC: 133 U/L (ref 40–150)
ALT SERPL W P-5'-P-CCNC: 90 U/L (ref 0–70)
ANION GAP SERPL CALCULATED.3IONS-SCNC: 7 MMOL/L (ref 3–14)
ANTIBODY SCREEN: NEGATIVE
APTT PPP: 33 SECONDS (ref 22–38)
AST SERPL W P-5'-P-CCNC: 52 U/L (ref 0–45)
ATRIAL RATE - MUSE: 65 BPM
BASOPHILS # BLD AUTO: 0 10E3/UL (ref 0–0.2)
BASOPHILS NFR BLD AUTO: 1 %
BILIRUB SERPL-MCNC: 0.4 MG/DL (ref 0.2–1.3)
BUN SERPL-MCNC: 16 MG/DL (ref 7–30)
CALCIUM SERPL-MCNC: 9.3 MG/DL (ref 8.5–10.1)
CD19 CELLS # BLD: 91 CELLS/UL (ref 107–698)
CD19 CELLS NFR BLD: 20 % (ref 6–27)
CD3 CELLS # BLD: 140 CELLS/UL (ref 603–2990)
CD3 CELLS NFR BLD: 31 % (ref 49–84)
CD3+CD4+ CELLS # BLD: 118 CELLS/UL (ref 441–2156)
CD3+CD4+ CELLS NFR BLD: 26 % (ref 28–63)
CD3+CD4+ CELLS/CD3+CD8+ CLL BLD: 5.56 % (ref 1.4–2.6)
CD3+CD8+ CELLS # BLD: 21 CELLS/UL (ref 125–1312)
CD3+CD8+ CELLS NFR BLD: 5 % (ref 10–40)
CD3-CD16+CD56+ CELLS # BLD: 209 CELLS/UL (ref 95–640)
CD3-CD16+CD56+ CELLS NFR BLD: 46 % (ref 4–25)
CHLORIDE BLD-SCNC: 108 MMOL/L (ref 94–109)
CO2 SERPL-SCNC: 27 MMOL/L (ref 20–32)
CREAT SERPL-MCNC: 1.19 MG/DL (ref 0.66–1.25)
DIASTOLIC BLOOD PRESSURE - MUSE: NORMAL MMHG
EOSINOPHIL # BLD AUTO: 0.1 10E3/UL (ref 0–0.7)
EOSINOPHIL NFR BLD AUTO: 2 %
ERYTHROCYTE [DISTWIDTH] IN BLOOD BY AUTOMATED COUNT: 13.7 % (ref 10–15)
GFR SERPL CREATININE-BSD FRML MDRD: 67 ML/MIN/1.73M2
GLUCOSE BLD-MCNC: 102 MG/DL (ref 70–99)
HBV CORE AB SERPL QL IA: NONREACTIVE
HBV SURFACE AG SERPL QL IA: NONREACTIVE
HCT VFR BLD AUTO: 33.7 % (ref 40–53)
HCV AB SERPL QL IA: NONREACTIVE
HGB BLD-MCNC: 11.6 G/DL (ref 13.3–17.7)
HIV 1+2 AB+HIV1 P24 AG SERPL QL IA: NONREACTIVE
IGG SERPL-MCNC: 352 MG/DL (ref 610–1616)
IMM GRANULOCYTES # BLD: 0 10E3/UL
IMM GRANULOCYTES NFR BLD: 1 %
INR PPP: 0.98 (ref 0.85–1.15)
INTERPRETATION ECG - MUSE: NORMAL
LVEF ECHO: NORMAL
LYMPHOCYTES # BLD AUTO: 0.3 10E3/UL (ref 0.8–5.3)
LYMPHOCYTES NFR BLD AUTO: 8 %
MCH RBC QN AUTO: 33.2 PG (ref 26.5–33)
MCHC RBC AUTO-ENTMCNC: 34.4 G/DL (ref 31.5–36.5)
MCV RBC AUTO: 97 FL (ref 78–100)
MONOCYTES # BLD AUTO: 0.6 10E3/UL (ref 0–1.3)
MONOCYTES NFR BLD AUTO: 13 %
NEUTROPHILS # BLD AUTO: 3.3 10E3/UL (ref 1.6–8.3)
NEUTROPHILS NFR BLD AUTO: 75 %
NRBC # BLD AUTO: 0 10E3/UL
NRBC BLD AUTO-RTO: 0 /100
P AXIS - MUSE: 64 DEGREES
PLATELET # BLD AUTO: 133 10E3/UL (ref 150–450)
POTASSIUM BLD-SCNC: 4.4 MMOL/L (ref 3.4–5.3)
PR INTERVAL - MUSE: 120 MS
PROT SERPL-MCNC: 6.8 G/DL (ref 6.8–8.8)
QRS DURATION - MUSE: 106 MS
QT - MUSE: 402 MS
QTC - MUSE: 418 MS
R AXIS - MUSE: -47 DEGREES
RBC # BLD AUTO: 3.49 10E6/UL (ref 4.4–5.9)
SODIUM SERPL-SCNC: 142 MMOL/L (ref 133–144)
SYSTOLIC BLOOD PRESSURE - MUSE: NORMAL MMHG
T AXIS - MUSE: 85 DEGREES
T CELL EXTENDED COMMENT: ABNORMAL
VENTRICULAR RATE- MUSE: 65 BPM
WBC # BLD AUTO: 4.3 10E3/UL (ref 4–11)

## 2021-12-03 PROCEDURE — 85730 THROMBOPLASTIN TIME PARTIAL: CPT

## 2021-12-03 PROCEDURE — 86704 HEP B CORE ANTIBODY TOTAL: CPT

## 2021-12-03 PROCEDURE — 85610 PROTHROMBIN TIME: CPT

## 2021-12-03 PROCEDURE — 86357 NK CELLS TOTAL COUNT: CPT

## 2021-12-03 PROCEDURE — 93005 ELECTROCARDIOGRAM TRACING: CPT

## 2021-12-03 PROCEDURE — 86803 HEPATITIS C AB TEST: CPT

## 2021-12-03 PROCEDURE — 93306 TTE W/DOPPLER COMPLETE: CPT

## 2021-12-03 PROCEDURE — 82040 ASSAY OF SERUM ALBUMIN: CPT

## 2021-12-03 PROCEDURE — 36591 DRAW BLOOD OFF VENOUS DEVICE: CPT

## 2021-12-03 PROCEDURE — 93010 ELECTROCARDIOGRAM REPORT: CPT | Performed by: INTERNAL MEDICINE

## 2021-12-03 PROCEDURE — 93306 TTE W/DOPPLER COMPLETE: CPT | Mod: 26 | Performed by: INTERNAL MEDICINE

## 2021-12-03 PROCEDURE — 82784 ASSAY IGA/IGD/IGG/IGM EACH: CPT

## 2021-12-03 PROCEDURE — 87389 HIV-1 AG W/HIV-1&-2 AB AG IA: CPT

## 2021-12-03 PROCEDURE — 85025 COMPLETE CBC W/AUTO DIFF WBC: CPT

## 2021-12-03 PROCEDURE — 250N000011 HC RX IP 250 OP 636

## 2021-12-03 PROCEDURE — 87340 HEPATITIS B SURFACE AG IA: CPT

## 2021-12-03 PROCEDURE — 86901 BLOOD TYPING SEROLOGIC RH(D): CPT

## 2021-12-03 RX ORDER — HEPARIN SODIUM (PORCINE) LOCK FLUSH IV SOLN 100 UNIT/ML 100 UNIT/ML
5 SOLUTION INTRAVENOUS ONCE
Status: COMPLETED | OUTPATIENT
Start: 2021-12-03 | End: 2021-12-03

## 2021-12-03 RX ADMIN — Medication 5 ML: at 09:24

## 2021-12-03 NOTE — PROGRESS NOTES
is 55 yo male with relapsed/refractory follicular lymphoma - now progressed after experimental NK cell therapy.   The consensus recommendation is to proceed with SOC  Yescarta cell therapy.    The therapeutic options are limited and given the failure of prior therapy, CAR-T is indicated; I recommended therapy  using commercial CAR-T product Yescarta.    CAR-T therapy Yescarta will be administered in /outpatient setting.    I recommend the following bridging therapy after T-cell apheresis to control the disease and debulk lymphoma: none or Polatuzumab/rituximab.      CAR-T cell products available commercially are standard of care therapy and our center is certified to administer CAR-Ts, the research aspects are limited to collection of data to research database.  Patient will have to wear the CAR-T therapy wallet card which needs to presented to ER at the time of visit.     Rosa Terry MD

## 2021-12-06 ENCOUNTER — HOSPITAL ENCOUNTER (OUTPATIENT)
Dept: LAB | Facility: CLINIC | Age: 56
Discharge: HOME | End: 2021-12-06
Attending: INTERNAL MEDICINE | Admitting: INTERNAL MEDICINE
Payer: COMMERCIAL

## 2021-12-06 VITALS
HEIGHT: 67 IN | HEART RATE: 73 BPM | RESPIRATION RATE: 18 BRPM | BODY MASS INDEX: 38.27 KG/M2 | DIASTOLIC BLOOD PRESSURE: 70 MMHG | SYSTOLIC BLOOD PRESSURE: 126 MMHG | WEIGHT: 243.83 LBS | TEMPERATURE: 98.5 F

## 2021-12-06 PROCEDURE — G0463 HOSPITAL OUTPT CLINIC VISIT: HCPCS

## 2021-12-06 ASSESSMENT — MIFFLIN-ST. JEOR: SCORE: 1889.75

## 2021-12-06 NOTE — CONSULTS
Transfusion Medicine Consultation    Juvenal Blake MRN# [id number]   YOB: 1965 Age: 56 year old   Date of consult: 12/6/2021     Reason for consult: Autologous mononuclear cell (MNC) collection           Assessment and Plan:   The patient is a 56 year old male with NHL (follicular lymphoma) who presents for consultation for autologous MNC collection for CAR-T manufacture (Yescarta, [place]).  The patient has adequate veins, and we will proceed with collection by peripheral IV.  The patient is on the schedule for next week (Monday, 12/13/21).     Attestation:  I discussed with the patient and his wife the non-mobilized MNC collection procedure including the risks and benefits.  I answered their questions to the best of my ability.  The patient indicated an understanding and agreed to proceed with the plan, signing the consent forms.  The patient's wife served as the witness for signature.  We will proceed with the collection plan as per BMT.    Jacob Ortiz M.D.  Professor, Transfusion Medicine  Laboratory Medicine & Pathology  Pager: [phone]         Chief Complaint:   Transfusion medicine consultation.         History of Present Illness:   The patient is a 56 year old male with NHL who presents for consultation for autologou MNC collection.  His past medical history includes NHL, prostate cancer (s/p prostatectomy), JAZLYN, and additional history (see PMH, PSH below).  He is currently well.  The patient denies any back pain that would prevent him from tolerating the procedure.  He has an allergy to ondansetron.  The patient confirms covid vaccination (see below).  The travel history is negative.  The patient has no identifiable risk factors for infectious disease.  The procedure, risks/benefits were discussed with the patient, and questions were answered prior to signing of consent forms.             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)      Low serum IgG for age      Non Hodgkin's lymphoma (H)      Non Hodgkin's lymphoma (H)      Prostate CA (H)      Shortness of breath      Sleep apnea     CPAP          Past Surgical History:     Past Surgical History:   Procedure Laterality Date     ABDOMEN SURGERY      for pyloric stenosis as an infant     COLONOSCOPY       DISTAL CLAVICLE EXCISION      per H & P      HERNIA REPAIR      inguinal     HERNIA REPAIR, UMBILICAL       INSERT PICC LINE N/A 8/24/2021    Procedure: INSERTION, PICC EXCHANGE, PREVIOUS PICC IS OUT 10 CENTIMETERS;  Surgeon: Christiano Murdock MD;  Location: [facility] OR     IR CHEST PORT PLACEMENT > 5 YRS OF AGE  02/17/2017     IR LYMPH NODE BIOPSY  10/01/2020     IR LYMPH NODE BIOPSY  05/28/2021     IR PICC PLACEMENT > 5 YRS OF AGE  8/24/2021     LAPAROSCOPIC HERNIORRHAPHY INCISIONAL Right 5/28/2020    Procedure: HERNIORRHAPHY, UMBILICAL, LAPAROSCOPIC; AXILLARY LYMPH NODE BIOPSY;  Surgeon: Rob Farrell MD;  Location: [facility];  Service: General     OTHER SURGICAL HISTORY Left     shoulder arthroscopy     OTHER SURGICAL HISTORY  01/2017    port a cath placement     PICC DOUBLE LUMEN PLACEMENT Right 06/27/2021    47cm (3cm external), Basilic vein     OR LAP,PROSTATECTOMY,RADICAL,W/NERVE SPARE,INCL ROBOTIC N/A 11/15/2017    Procedure: ROBOTIC ASSISTED RADICAL RETROPUBIC PROSTATECTOMY BILATERAL PELVIC LYMPH NODE DISSECTION ;  Surgeon: Ezio Steele MD;  Location: [facility];  Service: Urology     TONSILLECTOMY       US LYMPH NODE BIOPSY  1/7/2019          Social History:   , former smoker          Family History:     Family History   Problem Relation Age of Onset     Colon Cancer Mother         diagnosed in her late 40s     Lymphoma Father   "       non-Hodgkins lymphoma, diagnosed 70s     No Known Problems Brother      No Known Problems Sister      No Known Problems Son      No Known Problems Sister      No Known Problems Son      No Known Problems Sister      No Known Problems Brother      No Known Problems Son      No Known Problems Sister      No Known Problems Son      Lung Cancer Maternal Grandmother      Lung Cancer Maternal Uncle           Immunizations:     Most Recent Immunizations   Administered Date(s) Administered     COVID-19,PF,Pfizer (12+ Yrs) 09/10/2021     FLU 6-35 months 11/16/2016     Flu, Unspecified 09/18/2019     Influenza (H1N1) 12/31/2009     Influenza (IIV3) PF 10/19/2013     Influenza Vaccine IM > 6 months Valent IIV4 (Alfuria,Fluzone) 10/13/2019     Influenza,INJ,MDCK,PF,Quad >4yrs 10/12/2020     Pneumo Conj 13-V (2010&after) 11/18/2019     Pneumococcal 23 valent 05/08/2019     Td (Adult), Adsorbed 01/07/2003     Zoster vaccine recombinant adjuvanted (SHINGRIX) 06/30/2018          Allergies:     Allergies   Allergen Reactions     Ondansetron Itching          Medications:     Current Outpatient Medications   Medication     acyclovir (ZOVIRAX) 800 MG tablet     albuterol (VENTOLIN HFA) 108 (90 Base) MCG/ACT inhaler     beclomethasone HFA (QVAR REDIHALER) 80 MCG/ACT inhaler     guaiFENesin-codeine (ROBITUSSIN AC) 100-10 MG/5ML solution     medical cannabis (Patient's own supply)     omeprazole (PRILOSEC) 20 MG DR capsule     Psyllium (METAMUCIL FIBER) 51.7 % PACK     sulfamethoxazole-trimethoprim (BACTRIM DS) 800-160 MG tablet     tolterodine ER (DETROL LA) 4 MG 24 hr capsule     No current facility-administered medications for this encounter.          Review of Systems:   Currently feels well.  No recent signs/symptoms of viral illness.           Vital Signs:     Vitals:    12/06/21 1000   BP: 126/70   Pulse: 73   Resp: 18   Temp: 98.5  F (36.9  C)   TempSrc: Oral   Weight: 110.6 kg (243 lb 13.3 oz)   Height: 1.71 m (5' 7.32\") "            Data:      Blood type Rh(D)   O POS RH(D)   Date Value Ref Range Status   07/03/2021 Pos  Final         BMPRecent Labs   Lab 12/03/21  0919      POTASSIUM 4.4   CHLORIDE 108   TRE 9.3   CO2 27   BUN 16   CR 1.19   *     CBC  Recent Labs   Lab 12/03/21  0919   WBC 4.3   RBC 3.49*   HGB 11.6*   HCT 33.7*   MCV 97   MCH 33.2*   MCHC 34.4   RDW 13.7   *     INR  Recent Labs   Lab 12/03/21  0919   INR 0.98

## 2021-12-06 NOTE — PROGRESS NOTES
"APHERESIS INITIAL CONSULT CHECKLIST    Current Encounter Information  Current Encounter Information: Reason for Visit, Allergies and Current Meds  Procedure Requested: MNC/PBSC Collection  History of: (Reason for Apheresis): Follicular Lynphoma    Access Assessment  Access Assessment  Vein Assessment:  Veins are adequate: Yes checked by LMD and LM  Needs a catheter placed for Apheresis?: N/A    Vital Signs  Vital Signs  BP: 126/70  Pulse: 73  Temp: 98.5  F (36.9  C)  Temp src: Oral  Resp: 18  Height: 171 cm (5' 7.32\")  Weight: 110.6 kg (243 lb 13.3 oz)    Reviewed   Review With Patient  Have you read the brochure Getting ready for Apheresis?: Yes  Have you had any invasive procedures, surgery, biopsy, bleeding in the last month?: No  Review medications and allergies: Yes  Do you require pre-medication for blood products?: No  Patient given tour of the unit: No (Consult done in rm 114 with wife)  Photophoresis: sun precautions reviewed with patient: N/A    Additional Information  Notes, needs and time spent with patient  Explain procedure, side effects or reactions, instructions: Yes  Patient has special need?: No  Time spent: 45min face to face with patient and wife Nancy. Reviewed medication list and allergy to zofran which causes anxiety and itching. Vein assessment done by LMD and LM adequate for procedure. VSS. Explained procedure and reasoning behind importance for low fat diet next weekend along with hydrating for use of veins on Monday 12/13. Pt aware we can not use port for collection we will use x2 IV's to collect. Wife will drop patient off, they live in [place]. Pt will bring a lunch and wear comfortable clothing. Pt and wife denied questions and met with Dr. Pérez for consent.          "

## 2021-12-07 ENCOUNTER — VIRTUAL VISIT (OUTPATIENT)
Dept: TRANSPLANT | Facility: CLINIC | Age: 56
End: 2021-12-07
Payer: COMMERCIAL

## 2021-12-07 ENCOUNTER — MEDICAL CORRESPONDENCE (OUTPATIENT)
Dept: TRANSPLANT | Facility: CLINIC | Age: 56
End: 2021-12-07

## 2021-12-07 ENCOUNTER — APPOINTMENT (OUTPATIENT)
Dept: LAB | Facility: CLINIC | Age: 56
End: 2021-12-07
Payer: COMMERCIAL

## 2021-12-07 ENCOUNTER — ALLIED HEALTH/NURSE VISIT (OUTPATIENT)
Dept: TRANSPLANT | Facility: CLINIC | Age: 56
End: 2021-12-07
Payer: COMMERCIAL

## 2021-12-07 ENCOUNTER — OFFICE VISIT (OUTPATIENT)
Dept: TRANSPLANT | Facility: CLINIC | Age: 56
End: 2021-12-07
Payer: COMMERCIAL

## 2021-12-07 VITALS
HEART RATE: 76 BPM | HEIGHT: 69 IN | BODY MASS INDEX: 36.17 KG/M2 | DIASTOLIC BLOOD PRESSURE: 74 MMHG | SYSTOLIC BLOOD PRESSURE: 118 MMHG | WEIGHT: 244.2 LBS | TEMPERATURE: 98.1 F

## 2021-12-07 VITALS
RESPIRATION RATE: 16 BRPM | SYSTOLIC BLOOD PRESSURE: 118 MMHG | HEIGHT: 69 IN | BODY MASS INDEX: 36.18 KG/M2 | OXYGEN SATURATION: 98 % | DIASTOLIC BLOOD PRESSURE: 74 MMHG | TEMPERATURE: 98.1 F | HEART RATE: 76 BPM | WEIGHT: 244.27 LBS

## 2021-12-07 DIAGNOSIS — C82.08 FOLLICULAR LYMPHOMA GRADE I, LYMPH NODES OF MULTIPLE SITES (H): ICD-10-CM

## 2021-12-07 DIAGNOSIS — C82.90 FOLLICULAR LYMPHOMA (H): ICD-10-CM

## 2021-12-07 DIAGNOSIS — C82.08 FOLLICULAR LYMPHOMA GRADE I, LYMPH NODES OF MULTIPLE SITES (H): Primary | ICD-10-CM

## 2021-12-07 DIAGNOSIS — Z86.2 PERSONAL HISTORY OF DISEASES OF BLOOD AND BLOOD-FORMING ORGANS: ICD-10-CM

## 2021-12-07 DIAGNOSIS — Z01.818 EXAMINATION PRIOR TO CHEMOTHERAPY: ICD-10-CM

## 2021-12-07 DIAGNOSIS — C85.90 NH LYMPHOMA (H): ICD-10-CM

## 2021-12-07 DIAGNOSIS — C82.00 FOLLICULAR LYMPHOMA GRADE I, UNSPECIFIED BODY REGION (H): ICD-10-CM

## 2021-12-07 LAB
ALBUMIN SERPL-MCNC: 3.7 G/DL (ref 3.4–5)
ALP SERPL-CCNC: 108 U/L (ref 40–150)
ALT SERPL W P-5'-P-CCNC: 52 U/L (ref 0–70)
ANION GAP SERPL CALCULATED.3IONS-SCNC: 6 MMOL/L (ref 3–14)
AST SERPL W P-5'-P-CCNC: 22 U/L (ref 0–45)
BASOPHILS # BLD AUTO: 0 10E3/UL (ref 0–0.2)
BASOPHILS NFR BLD AUTO: 0 %
BILIRUB SERPL-MCNC: 0.4 MG/DL (ref 0.2–1.3)
BUN SERPL-MCNC: 12 MG/DL (ref 7–30)
CALCIUM SERPL-MCNC: 9.1 MG/DL (ref 8.5–10.1)
CHLORIDE BLD-SCNC: 108 MMOL/L (ref 94–109)
CO2 SERPL-SCNC: 27 MMOL/L (ref 20–32)
CREAT SERPL-MCNC: 1.31 MG/DL (ref 0.66–1.25)
EOSINOPHIL # BLD AUTO: 0.1 10E3/UL (ref 0–0.7)
EOSINOPHIL NFR BLD AUTO: 1 %
ERYTHROCYTE [DISTWIDTH] IN BLOOD BY AUTOMATED COUNT: 13.3 % (ref 10–15)
GFR SERPL CREATININE-BSD FRML MDRD: 60 ML/MIN/1.73M2
GLUCOSE BLD-MCNC: 111 MG/DL (ref 70–99)
HCT VFR BLD AUTO: 33.4 % (ref 40–53)
HGB BLD-MCNC: 11.3 G/DL (ref 13.3–17.7)
IMM GRANULOCYTES # BLD: 0.1 10E3/UL
IMM GRANULOCYTES NFR BLD: 1 %
LYMPHOCYTES # BLD AUTO: 0.3 10E3/UL (ref 0.8–5.3)
LYMPHOCYTES NFR BLD AUTO: 6 %
MCH RBC QN AUTO: 32.7 PG (ref 26.5–33)
MCHC RBC AUTO-ENTMCNC: 33.8 G/DL (ref 31.5–36.5)
MCV RBC AUTO: 97 FL (ref 78–100)
MONOCYTES # BLD AUTO: 0.5 10E3/UL (ref 0–1.3)
MONOCYTES NFR BLD AUTO: 10 %
NEUTROPHILS # BLD AUTO: 4.1 10E3/UL (ref 1.6–8.3)
NEUTROPHILS NFR BLD AUTO: 82 %
NRBC # BLD AUTO: 0 10E3/UL
NRBC BLD AUTO-RTO: 0 /100
PLATELET # BLD AUTO: 120 10E3/UL (ref 150–450)
POTASSIUM BLD-SCNC: 4 MMOL/L (ref 3.4–5.3)
PROT SERPL-MCNC: 6.6 G/DL (ref 6.8–8.8)
RBC # BLD AUTO: 3.46 10E6/UL (ref 4.4–5.9)
SODIUM SERPL-SCNC: 141 MMOL/L (ref 133–144)
WBC # BLD AUTO: 5 10E3/UL (ref 4–11)

## 2021-12-07 PROCEDURE — 86780 TREPONEMA PALLIDUM: CPT

## 2021-12-07 PROCEDURE — 99215 OFFICE O/P EST HI 40 MIN: CPT | Mod: GC

## 2021-12-07 PROCEDURE — 250N000011 HC RX IP 250 OP 636: Performed by: PHYSICIAN ASSISTANT

## 2021-12-07 PROCEDURE — 86695 HERPES SIMPLEX TYPE 1 TEST: CPT

## 2021-12-07 PROCEDURE — U0003 INFECTIOUS AGENT DETECTION BY NUCLEIC ACID (DNA OR RNA); SEVERE ACUTE RESPIRATORY SYNDROME CORONAVIRUS 2 (SARS-COV-2) (CORONAVIRUS DISEASE [COVID-19]), AMPLIFIED PROBE TECHNIQUE, MAKING USE OF HIGH THROUGHPUT TECHNOLOGIES AS DESCRIBED BY CMS-2020-01-R: HCPCS | Performed by: INTERNAL MEDICINE

## 2021-12-07 PROCEDURE — 80053 COMPREHEN METABOLIC PANEL: CPT

## 2021-12-07 PROCEDURE — 85025 COMPLETE CBC W/AUTO DIFF WBC: CPT

## 2021-12-07 PROCEDURE — 36591 DRAW BLOOD OFF VENOUS DEVICE: CPT

## 2021-12-07 PROCEDURE — 87516 HEPATITIS B DNA AMP PROBE: CPT

## 2021-12-07 PROCEDURE — 86696 HERPES SIMPLEX TYPE 2 TEST: CPT

## 2021-12-07 PROCEDURE — 86665 EPSTEIN-BARR CAPSID VCA: CPT

## 2021-12-07 RX ORDER — HEPARIN SODIUM (PORCINE) LOCK FLUSH IV SOLN 100 UNIT/ML 100 UNIT/ML
5 SOLUTION INTRAVENOUS
Status: DISCONTINUED | OUTPATIENT
Start: 2021-12-07 | End: 2021-12-07 | Stop reason: HOSPADM

## 2021-12-07 RX ORDER — HEPARIN SODIUM,PORCINE 10 UNIT/ML
5 VIAL (ML) INTRAVENOUS
Status: CANCELLED | OUTPATIENT
Start: 2021-12-07

## 2021-12-07 RX ORDER — HEPARIN SODIUM (PORCINE) LOCK FLUSH IV SOLN 100 UNIT/ML 100 UNIT/ML
5 SOLUTION INTRAVENOUS
Status: CANCELLED | OUTPATIENT
Start: 2021-12-07

## 2021-12-07 RX ADMIN — SODIUM CHLORIDE, PRESERVATIVE FREE 5 ML: 5 INJECTION INTRAVENOUS at 14:18

## 2021-12-07 ASSESSMENT — PAIN SCALES - GENERAL
PAINLEVEL: MILD PAIN (2)
PAINLEVEL: MILD PAIN (2)

## 2021-12-07 ASSESSMENT — MIFFLIN-ST. JEOR
SCORE: 1922.99
SCORE: 1922.67

## 2021-12-07 NOTE — NURSING NOTE
"Oncology Rooming Note    December 7, 2021 1:32 PM   Juvenal Blake is a 56 year old male who presents for:    Chief Complaint   Patient presents with     Oncology Clinic Visit     Hx follicular lymphoma here for provider appointment      Initial Vitals: /74   Pulse 76   Temp 98.1  F (36.7  C)   Resp 16   Ht 1.76 m (5' 9.29\")   Wt 110.8 kg (244 lb 4.3 oz)   SpO2 98%   BMI 35.77 kg/m   Estimated body mass index is 35.77 kg/m  as calculated from the following:    Height as of this encounter: 1.76 m (5' 9.29\").    Weight as of this encounter: 110.8 kg (244 lb 4.3 oz). Body surface area is 2.33 meters squared.  Mild Pain (2) Comment: Data Unavailable   No LMP for male patient.  Allergies reviewed: Yes  Medications reviewed: Yes    Medications: Medication refills not needed today.  Pharmacy name entered into [employer]: [id number] - [place], [place] - [address]  AT [facility]    Clinical concerns: N/A    Ludivina Aquino RN              "

## 2021-12-07 NOTE — LETTER
Date:January 1, 2022      Provider requested that no letter be sent. Do not send.       [facility]

## 2021-12-07 NOTE — PROGRESS NOTES
Hx follicular lymphoma here for initial workup appointment. VSS, A&Ox4. Height and weight obtained. Allergies and medications reviewed. Calendar of upcoming clinic visits discussed at length. Map and locations of appointments explained. Clinic consents obtained. Covid swab obtained and sent to lab. Labs drawn via port and sent to lab. Folder given and patient advised to look through before meeting with nurse coordinator. Total time spent 1 hour.

## 2021-12-07 NOTE — PROGRESS NOTES
Outpatient IR Biopsy Referral    Patient is a 57 y/o male with a PMH of JAZLYN, GERD, morbid obesity, prostate cancer s/p prostatectomy, relapsed follicular lymphoma with progression after experimental NK cell therapy with plans for CAR T therapy.  IR has been asked to biopsy a lymph node for follicular lymphoma progression treatment and for reseach.    Case and imaging CT 11/27/21, PET CT 9/21/21 was reviewed with Dr. Post from IR and US guided biopsy of the right Inguinal lymph node is recommended. Series 3  Image 659.            Procedure order and surgical pathology and flow order placed. Patient is in a clinical trial, referring team to enter research samples.     If requesting team would like sample sent for anything else please enter prior to scheduled procedure.     Primary team Dr. Terry and Emilia Castillo RN made aware of IR recommendations via epic messaging.     ALESSANDRO Walker CNP  Interventional Radiology   IR on-call pager: [phone]

## 2021-12-07 NOTE — LETTER
12/7/2021         RE: Juvenal Blake  [address] [place] [address]        Dear Colleague,    Thank you for referring your patient, Juvenal Blake, to the [facility] BLOOD AND MARROW TRANSPLANT PROGRAM [place]. Please see a copy of my visit note below.    BMT Teaching Flowsheet    Juvenal Blake is a 56 year old male  Diagnoses of Follicular lymphoma grade i, lymph nodes of multiple sites (H), Examination prior to chemotherapy, Follicular lymphoma (H), NH lymphoma (H), and Personal history of diseases of blood and blood-forming organs were pertinent to this visit.    Teaching Topic: cart apheresis    Person(s) involved in teaching: Patient  Motivation Level  Asks Questions: Yes  Eager to Learn: Yes  Cooperative: Yes  Receptive (willing/able to accept information): Yes  Any cultural factors/Sabianist beliefs that may influence understanding or compliance? No    Patient demonstrates understanding of the following:  - Reason for the appointment, diagnosis and treatment plan: Yes  - Knowledge of proper use of medications and conditions for which they are ordered (with special attention to potential side effects or drug interactions): Yes  - Which situations necessitate calling provider and whom to contact: Yes    Teaching concerns addressed:  Reviewed plan for apheresis of T cells to be sent to [facility] . Juvenal does not need a central line for apheresis. He verbalized understanding of plan and all questions were answered to his satisfaction    Proper use and care of (medical equipment, care aids, etc.) NA  Pain management techniques: NA  Patient instructed on hand hygiene: Yes  How and/when to access community resources: Yes    Infection Control:  Patient demonstrates understanding of the following:  Surgical procedure site care taught NA  Signs and symptoms of infection taught Yes  Wound care taught NA  Central venous catheter care taught NA    Instructional Materials Used/Given:    [facility] booklet.    For [facility]/CAR-T patient wallet card given. Patient instructed to remain within close proximity (at least two hours) for at least four weeks post infusion. CAR-T patient is instructed not to operate motorized vehicle or heavy machinery for a period of 8 weeks.    Research participant Juvenal Blake was provided the information on the Research Participant Information Sheet by Hilda Lui RN on December 7, 2021. This document contains information regarding the risks of participating in a research study during the COVID-19 pandemic. Receipt of the information sheet was confirmed by study personnel prior to the visit.    Time spent with patient: 20 minutes.      Specific Concerns: NA      BMT Nurse Coordinator

## 2021-12-07 NOTE — LETTER
"    2021         RE: Juvenal Blake  [address] [place] [address]        Dear Colleague,    Thank you for referring your patient, Juvenal Blake, to the [facility] BLOOD AND MARROW TRANSPLANT PROGRAM [place]. Please see a copy of my visit note below.    BMT Clinic Note     Juvenal Blake is a 55 year old male PMH significant for refractory NHL, who is being considered for [facility] CAR-T cellular therapy. He is here for Close visit.     HPI:   Feels well. Since his last follow up, he is overall doing well. The lymph node on the inner thihg is still there.  Energy level is a little low but appetite is good.  Weight stable.  No fevers, chills, or night sweats.  No headache, vision changes, focal weakness or sensory changes.  No dyspnea, cough, or chest pain.  Normal bowel function, but less solid than usual.  No urinary complaints.  No bleeding, bruising, or skin rashes.  No pain.  No lumps or bumps.  Managing well.     ROS: 14 point ROS negative except as above.      Physical Exam:     /74   Pulse 76   Temp 98.1  F (36.7  C)   Resp 16   Ht 1.76 m (5' 9.29\")   Wt 110.8 kg (244 lb 4.3 oz)   SpO2 98%   BMI 35.77 kg/m    GA: NAD. Appears as stated age.  HEENT: PERRL, OP Clear  Neck: Supple, no JVD  CV: RRR, no mrg  Lungs: CTAB, no wheezes  Abd: Soft, nt, nd  Lymph: No cervical LAD, no HSM. 2x2 mobile LN in the right groin  Ext: 1+ edema. Warm  Neuro: AAO, moving all ext. Symmetric face  Skin: warm, no rashes  Psyc: Appropriate and cooperative  Access: none                                                                                                                                                                       ECO    Labs:  Lab Results   Component Value Date    WBC 5.0 2021    ANEU 0.6 (L) 2021    HGB 11.3 (L) 2021    HCT 33.4 (L) 2021     (L) 2021     2021    POTASSIUM 4.0 2021    CHLORIDE 108 2021    CO2 27 " 12/07/2021     (H) 12/07/2021    BUN 12 12/07/2021    CR 1.31 (H) 12/07/2021    MAG 2.0 11/19/2021    INR 0.98 12/03/2021    BILITOTAL 0.4 12/07/2021    AST 22 12/07/2021    ALT 52 12/07/2021    ALKPHOS 108 12/07/2021    PROTTOTAL 6.6 (L) 12/07/2021    ALBUMIN 3.7 12/07/2021       Assessment and Plan:   Juvenal Blake is a 55 year old male [facility] significant for refractory NHL, undergoing  NK infusion. Admitted with rituxan (biosimilar) infusion reaction 6/24 and remained admitted through completion of lymphodepleting chemo and  NK cell infusion.      1. Follicular Lymphoma:   - relapsed after BR, [facility], [facility] NK and now . He has had idelalisib as a bridge and responded.  - Collection on 12/13. We discussed the lymphodepleting chemotherapy followed by CAR-T cell infusion.  - We discussed the potential benefits and risks. In the Zuma-5 study there was a response rate of >90%, as well as a potential of CRS and neurotoxicity in about 10-20% of patients. We also discussed side effects including nausea, vomiting, fevers, CRS, neurotoxicity.  - He signed consents for the [facility] trial as well as the biopsy of the groin lymph node.  - While waiting for the cells to be processed, he will undergo further treatment as per Dr. Terry.    2. HEME: Keep hgb >7g/dL, plt>10,000.   Mild anemia and thrombocytopenia secondary to chemotherapy/lymphoma. No transfusions needed.     3. CV: Cardiology cleared him to proceed with no further testing. The CT angiogram showed no lesion requiring stenting or bypass.    4. :   Post radical prostatectomy and bilateral lymph node dissection. November 15, 2017. Prostatic adenocarcinoma [place] 4+3 = 7 with tertiary pattern 5. Tumor involves 45% of total surface area. Positive for extensive extraprostatic extension. Positive for bilateral seminal vesicle invasion. Multiple margins positive. Lymphovascular invasion not identified. Perineural invasion was noted. Lymph nodes  negative. 3 were examined (2 right obturator and 1 left obturator). Completed radiation to the prostate fossa and pelvic lymph nodes at [facility] early May 2018. He received 4500 cGy in 25 fractions. He then had 2340 cGy boost in 13 fractions to the prostatic fossa, for a total dose of 6240 cGy in 38 treatment fractions delivered over 54 days. He did not receive hormonal therapy:    9/10 PSA: 0.71 - no concerns.      5. ID:  Proph: acyclovir 800mg bid through 1 year as shingles prophylaxis, Bactrim (for 6 months post therapy, then check T cell subsets to determine if he has ongoing need).  - hx C diff colitis   - s/p 3 covid shots; first two were pre- and booster was after; messaged [facility] to ask if we are to resume full series, as was recently decided post cellular therapy.   - Rhinovirus+10/6. CXR with mild bronchial thickening but no PNA. IgG low at 240, got IVIG last month. No real indication for continued IVIG replacement.  - Mild URI symptoms at this time.    6.FEN/Renal:   - Cr mildly elevated.    - Lytes stable.      7. GI:    #intermittent nausea -  medical cannibis.  #Abdominal Pain secondary to cancer. He is in remission, but still has this pain.      8. Dental: s/p root canal; well healed and mouth feels good.     RTC per primary BMT team.    60 minutes spent on the date of the encounter doing chart review, interpretation of results, patient visit, documentation and coordination of care. >50% of time spent in coordination of care and counseling.    Discussed with Dr. Zavala.    Dominic Castaneda MD  Hematology/BMT Fellow           Attestation signed by Scott Zavala MD at 12/8/2021  8:33 AM:  I saw Mr. Brewer with assistance of Dr. Castaneda, BMT fellow.  The note above reflects our mutual assessment and plan.  In summary Mr. Romero has multiply relapsed follicular lymphoma.  He just had experimental therapy with .  He now has progressive disease again.  His doctor recommended that he  proceed with axi-cell car-T-cell therapy for follicular lymphoma.  This was recently FDA approved.  We discussed with the patient and his wife potential risks and benefits of this type of therapy.  In particular, we discussed cytokine release syndrome and neurotoxicity.  We described a lymphodepleting chemotherapy, the infusion of the car-T cells and the monitoring the outpatient setting, and admission to the inpatient setting if necessary.  The patient and his wife showed good understanding.  Their questions were answered to their apparent satisfaction.    We then reviewed the consent for our institutional cardiac T-cell protocol.  We explained that there is no experimental component on the protocol other than data collection.  We explained the voluntary nature of participation in the ability to withdraw any time is his feet without impacting his care.  The patient showed good understanding and was willing to participate signing consent.  We also discussed with him a biopsy study from the previous clinical trial that would need to be obtained prior to the start of new therapy.  Explain why the biopsies obtained for molecular studies.  I explained that participating on the study or not would not impact his care.  Again explained the voluntary nature of participation in the study.  Also explained that he could withdraw from the study and any time should his his feet.  He also showed good understanding and was willing to participate.    At this time the patient is a schedule for the collection of his lymphocytes for the CAR-T-cell collection early next week.  His primary oncologist will work on bridging therapy in the meantime.    Scott Zavala MD on 12/8/2021 at 8:32 AM          Again, thank you for allowing me to participate in the care of your patient.        Sincerely,        BMT DOM

## 2021-12-07 NOTE — PROGRESS NOTES
"BMT Clinic Note     Juvenal Blake is a 55 year old male PMH significant for refractory NHL, who is being considered for [facility] CAR-T cellular therapy. He is here for Close visit.     HPI:   Feels well. Since his last follow up, he is overall doing well. The lymph node on the inner thihg is still there.  Energy level is a little low but appetite is good.  Weight stable.  No fevers, chills, or night sweats.  No headache, vision changes, focal weakness or sensory changes.  No dyspnea, cough, or chest pain.  Normal bowel function, but less solid than usual.  No urinary complaints.  No bleeding, bruising, or skin rashes.  No pain.  No lumps or bumps.  Managing well.     ROS: 14 point ROS negative except as above.      Physical Exam:     /74   Pulse 76   Temp 98.1  F (36.7  C)   Resp 16   Ht 1.76 m (5' 9.29\")   Wt 110.8 kg (244 lb 4.3 oz)   SpO2 98%   BMI 35.77 kg/m    GA: NAD. Appears as stated age.  HEENT: PERRL, OP Clear  Neck: Supple, no JVD  CV: RRR, no mrg  Lungs: CTAB, no wheezes  Abd: Soft, nt, nd  Lymph: No cervical LAD, no HSM. 2x2 mobile LN in the right groin  Ext: 1+ edema. Warm  Neuro: AAO, moving all ext. Symmetric face  Skin: warm, no rashes  Psyc: Appropriate and cooperative  Access: none                                                                                                                                                                       ECO    Labs:  Lab Results   Component Value Date    WBC 5.0 2021    ANEU 0.6 (L) 2021    HGB 11.3 (L) 2021    HCT 33.4 (L) 2021     (L) 2021     2021    POTASSIUM 4.0 2021    CHLORIDE 108 2021    CO2 27 2021     (H) 2021    BUN 12 2021    CR 1.31 (H) 2021    MAG 2.0 2021    INR 0.98 2021    BILITOTAL 0.4 2021    AST 22 2021    ALT 52 2021    ALKPHOS 108 2021    PROTTOTAL 6.6 (L) 2021    ALBUMIN 3.7 " 12/07/2021       Assessment and Plan:   Juvenal Blake is a 55 year old male [facility] significant for refractory NHL, undergoing  NK infusion. Admitted with rituxan (biosimilar) infusion reaction 6/24 and remained admitted through completion of lymphodepleting chemo and  NK cell infusion.      1. Follicular Lymphoma:   - relapsed after BR, [facility], [facility] NK and now . He has had idelalisib as a bridge and responded.  - Collection on 12/13. We discussed the lymphodepleting chemotherapy followed by CAR-T cell infusion.  - We discussed the potential benefits and risks. In the Zuma-5 study there was a response rate of >90%, as well as a potential of CRS and neurotoxicity in about 10-20% of patients. We also discussed side effects including nausea, vomiting, fevers, CRS, neurotoxicity.  - He signed consents for the [facility] trial as well as the biopsy of the groin lymph node.  - While waiting for the cells to be processed, he will undergo further treatment as per Dr. Terry.    2. HEME: Keep hgb >7g/dL, plt>10,000.   Mild anemia and thrombocytopenia secondary to chemotherapy/lymphoma. No transfusions needed.     3. CV: Cardiology cleared him to proceed with no further testing. The CT angiogram showed no lesion requiring stenting or bypass.    4. :   Post radical prostatectomy and bilateral lymph node dissection. November 15, 2017. Prostatic adenocarcinoma [place] 4+3 = 7 with tertiary pattern 5. Tumor involves 45% of total surface area. Positive for extensive extraprostatic extension. Positive for bilateral seminal vesicle invasion. Multiple margins positive. Lymphovascular invasion not identified. Perineural invasion was noted. Lymph nodes negative. 3 were examined (2 right obturator and 1 left obturator). Completed radiation to the prostate fossa and pelvic lymph nodes at [facility] early May 2018. He received 4500 cGy in 25 fractions. He then had 2340 cGy boost in 13 fractions to the prostatic  nikki, for a total dose of 6240 cGy in 38 treatment fractions delivered over 54 days. He did not receive hormonal therapy:    9/10 PSA: 0.71 - no concerns.      5. ID:  Proph: acyclovir 800mg bid through 1 year as shingles prophylaxis, Bactrim (for 6 months post therapy, then check T cell subsets to determine if he has ongoing need).  - hx C diff colitis   - s/p 3 covid shots; first two were pre- and booster was after; messaged [facility] to ask if we are to resume full series, as was recently decided post cellular therapy.   - Rhinovirus+10/6. CXR with mild bronchial thickening but no PNA. IgG low at 240, got IVIG last month. No real indication for continued IVIG replacement.  - Mild URI symptoms at this time.    6.FEN/Renal:   - Cr mildly elevated.    - Lytes stable.      7. GI:    #intermittent nausea -  medical cannibis.  #Abdominal Pain secondary to cancer. He is in remission, but still has this pain.      8. Dental: s/p root canal; well healed and mouth feels good.     RTC per primary BMT team.    60 minutes spent on the date of the encounter doing chart review, interpretation of results, patient visit, documentation and coordination of care. >50% of time spent in coordination of care and counseling.    Discussed with Dr. Zavala.    Dominic Castaneda MD  Hematology/BMT Fellow

## 2021-12-07 NOTE — PROGRESS NOTES
BMT Teaching Flowsheet    Juvenal Blake is a 56 year old male  Diagnoses of Follicular lymphoma grade i, lymph nodes of multiple sites (H), Examination prior to chemotherapy, Follicular lymphoma (H), NH lymphoma (H), and Personal history of diseases of blood and blood-forming organs were pertinent to this visit.    Teaching Topic: cart apheresis    Person(s) involved in teaching: Patient  Motivation Level  Asks Questions: Yes  Eager to Learn: Yes  Cooperative: Yes  Receptive (willing/able to accept information): Yes  Any cultural factors/Christian beliefs that may influence understanding or compliance? No    Patient demonstrates understanding of the following:  - Reason for the appointment, diagnosis and treatment plan: Yes  - Knowledge of proper use of medications and conditions for which they are ordered (with special attention to potential side effects or drug interactions): Yes  - Which situations necessitate calling provider and whom to contact: Yes    Teaching concerns addressed:  Reviewed plan for apheresis of T cells to be sent to [facility] . Juvenal does not need a central line for apheresis. He verbalized understanding of plan and all questions were answered to his satisfaction    Proper use and care of (medical equipment, care aids, etc.) NA  Pain management techniques: NA  Patient instructed on hand hygiene: Yes  How and/when to access community resources: Yes    Infection Control:  Patient demonstrates understanding of the following:  Surgical procedure site care taught NA  Signs and symptoms of infection taught Yes  Wound care taught NA  Central venous catheter care taught NA    Instructional Materials Used/Given:   [facility] booklet.    For [facility]/CAR-T patient wallet card given. Patient instructed to remain within close proximity (at least two hours) for at least four weeks post infusion. CAR-T patient is instructed not to operate motorized vehicle or heavy machinery for a period  of 8 weeks.    Research participant Juvenal Blake was provided the information on the Research Participant Information Sheet by Hilda Lui RN on December 7, 2021. This document contains information regarding the risks of participating in a research study during the COVID-19 pandemic. Receipt of the information sheet was confirmed by study personnel prior to the visit.    Time spent with patient: 20 minutes.      Specific Concerns: NA

## 2021-12-08 DIAGNOSIS — Z11.59 ENCOUNTER FOR SCREENING FOR OTHER VIRAL DISEASES: Primary | ICD-10-CM

## 2021-12-08 LAB
EBV VCA IGG SER IA-ACNC: 145 U/ML
EBV VCA IGG SER IA-ACNC: POSITIVE
HSV1 IGG SERPL QL IA: 4.3 INDEX
HSV1 IGG SERPL QL IA: ABNORMAL
HSV2 IGG SERPL QL IA: 0.36 INDEX
HSV2 IGG SERPL QL IA: ABNORMAL
SARS-COV-2 RNA RESP QL NAA+PROBE: NEGATIVE

## 2021-12-10 LAB
DONOR CYTOMEGALOVIRUS ABY: POSITIVE
DONOR HEP B CORE ABY: ABNORMAL
DONOR HEP B SURF AGN: ABNORMAL
DONOR HEPATITIS C ABY: ABNORMAL
DONOR HTLV 1&2 ANTIBODY: ABNORMAL
DONOR TREPONEMA PAL ABY: ABNORMAL
HBV DNA SERPL QL NAA+PROBE: NORMAL
HCV RNA SERPL QL NAA+PROBE: NORMAL
HIV1+2 AB SERPL QL IA: ABNORMAL
HIV1+2 RNA SERPL QL NAA+PROBE: NORMAL
TRYPANOSOMA CRUZI: ABNORMAL
WNV RNA SERPL DONR QL NAA+PROBE: NORMAL

## 2021-12-13 ENCOUNTER — ONCOLOGY VISIT (OUTPATIENT)
Dept: TRANSPLANT | Facility: CLINIC | Age: 56
End: 2021-12-13
Attending: PHYSICIAN ASSISTANT
Payer: COMMERCIAL

## 2021-12-13 ENCOUNTER — RESEARCH ENCOUNTER (OUTPATIENT)
Dept: TRANSPLANT | Facility: CLINIC | Age: 56
End: 2021-12-13

## 2021-12-13 ENCOUNTER — APPOINTMENT (OUTPATIENT)
Dept: LAB | Facility: CLINIC | Age: 56
End: 2021-12-13
Payer: COMMERCIAL

## 2021-12-13 ENCOUNTER — HOSPITAL ENCOUNTER (OUTPATIENT)
Dept: LAB | Facility: CLINIC | Age: 56
End: 2021-12-13
Attending: INTERNAL MEDICINE
Payer: COMMERCIAL

## 2021-12-13 VITALS
DIASTOLIC BLOOD PRESSURE: 62 MMHG | SYSTOLIC BLOOD PRESSURE: 136 MMHG | HEART RATE: 71 BPM | TEMPERATURE: 99.4 F | BODY MASS INDEX: 34.35 KG/M2 | RESPIRATION RATE: 20 BRPM | WEIGHT: 231.92 LBS | HEIGHT: 69 IN

## 2021-12-13 DIAGNOSIS — Z86.2 PERSONAL HISTORY OF DISEASES OF BLOOD AND BLOOD-FORMING ORGANS: Primary | ICD-10-CM

## 2021-12-13 DIAGNOSIS — C82.83 OTHER TYPE OF FOLLICULAR LYMPHOMA OF INTRA-ABDOMINAL LYMPH NODE (H): ICD-10-CM

## 2021-12-13 LAB
APTT PPP: 28 SECONDS (ref 22–38)
BASOPHILS # BLD AUTO: 0 10E3/UL (ref 0–0.2)
BASOPHILS NFR BLD AUTO: 0 %
EOSINOPHIL # BLD AUTO: 0.1 10E3/UL (ref 0–0.7)
EOSINOPHIL NFR BLD AUTO: 1 %
ERYTHROCYTE [DISTWIDTH] IN BLOOD BY AUTOMATED COUNT: 13.7 % (ref 10–15)
HCT VFR BLD AUTO: 34 % (ref 40–53)
HGB BLD-MCNC: 11.6 G/DL (ref 13.3–17.7)
IMM GRANULOCYTES # BLD: 0 10E3/UL
IMM GRANULOCYTES NFR BLD: 0 %
INR PPP: 1.05 (ref 0.85–1.15)
LYMPHOCYTES # BLD AUTO: 0.4 10E3/UL (ref 0.8–5.3)
LYMPHOCYTES NFR BLD AUTO: 7 %
MCH RBC QN AUTO: 33.4 PG (ref 26.5–33)
MCHC RBC AUTO-ENTMCNC: 34.1 G/DL (ref 31.5–36.5)
MCV RBC AUTO: 98 FL (ref 78–100)
MONOCYTES # BLD AUTO: 0.6 10E3/UL (ref 0–1.3)
MONOCYTES NFR BLD AUTO: 11 %
NEUTROPHILS # BLD AUTO: 4.5 10E3/UL (ref 1.6–8.3)
NEUTROPHILS NFR BLD AUTO: 81 %
NRBC # BLD AUTO: 0 10E3/UL
NRBC BLD AUTO-RTO: 0 /100
PLATELET # BLD AUTO: 122 10E3/UL (ref 150–450)
RBC # BLD AUTO: 3.47 10E6/UL (ref 4.4–5.9)
WBC # BLD AUTO: 5.6 10E3/UL (ref 4–11)

## 2021-12-13 PROCEDURE — 36415 COLL VENOUS BLD VENIPUNCTURE: CPT

## 2021-12-13 PROCEDURE — 250N000009 HC RX 250: Performed by: PATHOLOGY

## 2021-12-13 PROCEDURE — 85610 PROTHROMBIN TIME: CPT

## 2021-12-13 PROCEDURE — 36415 COLL VENOUS BLD VENIPUNCTURE: CPT | Performed by: PATHOLOGY

## 2021-12-13 PROCEDURE — 999N000127 HC STATISTIC PERIPHERAL IV START W US GUIDANCE

## 2021-12-13 PROCEDURE — 99214 OFFICE O/P EST MOD 30 MIN: CPT

## 2021-12-13 PROCEDURE — 0537T HC CAR-T THERAPY, HARVEST BLD DRV T LYMPCYT, PER DAY, ADULT: CPT

## 2021-12-13 PROCEDURE — 85025 COMPLETE CBC W/AUTO DIFF WBC: CPT | Performed by: PATHOLOGY

## 2021-12-13 PROCEDURE — 250N000011 HC RX IP 250 OP 636: Performed by: PATHOLOGY

## 2021-12-13 PROCEDURE — 85730 THROMBOPLASTIN TIME PARTIAL: CPT

## 2021-12-13 RX ADMIN — CALCIUM GLUCONATE 2216 MG/HR: 94 INJECTION, SOLUTION INTRAVENOUS at 09:16

## 2021-12-13 RX ADMIN — ANTICOAGULANT CITRATE DEXTROSE SOLUTION FORMULA A 1108 ML: 12.25; 11; 3.65 SOLUTION INTRAVENOUS at 09:16

## 2021-12-13 ASSESSMENT — MIFFLIN-ST. JEOR: SCORE: 1866.99

## 2021-12-13 NOTE — LETTER
2021         RE: Juvenal Blake  [address] [place] [address]        Dear Colleague,    Thank you for referring your patient, Juvenal Blake, to the [facility]LOOD AND MARROW TRANSPLANT [facility] [place]. Please see a copy of my visit note below.    BMT Clinic Note     Juvenal Blake is a 55 year old male PMH significant for refractory NHL, who is being considered for [facility] CAR-T cellular therapy.      HPI:   Feels well. Since his last follow up, he is overall doing well. The lymph node on the inner thihg is still there, maybe slightly larger. Has had cough,cold symptoms for past few weeks but covid negative. Denies sob and fevers. No sob. He understands that takes a few weeks to manufacture cells. He has been feeling well.     ROS: 14 point ROS negative except as above.      Physical Exam:   vitals reviewed.   There were no vitals taken for this visit.  GA: NAD. Appears as stated age.  HEENT: PERRL, OP Clear  Neck: Supple, no JVD  CV: RRR, no mrg  Lungs: CTAB, no wheezes  Abd: Soft, nt, nd  Lymph: did not exam as pt laying in aphresis bed.   Ext: 1+ edema. Warm  Neuro: AAO, moving all ext. Symmetric face  Skin: warm, no rashes  Psyc: Appropriate and cooperative  Access: Large bore peripheral IV in bilateral arms for apheresis.                                                                                                                                                                   ECO    Labs:  Lab Results   Component Value Date    WBC 5.0 2021    ANEU 0.6 (L) 2021    HGB 11.3 (L) 2021    HCT 33.4 (L) 2021     (L) 2021     2021    POTASSIUM 4.0 2021    CHLORIDE 108 2021    CO2 27 2021     (H) 2021    BUN 12 2021    CR 1.31 (H) 2021    MAG 2.0 2021    INR 0.98 2021    BILITOTAL 0.4 2021    AST 22 2021    ALT 52 2021    ALKPHOS 108 2021     PROTTOTAL 6.6 (L) 12/07/2021    ALBUMIN 3.7 12/07/2021       Assessment and Plan:   Juvenal Blake is a 55 year old male PMH significant for refractory NHL, undergoing  NK infusion. Admitted with rituxan (biosimilar) infusion reaction 6/24 and remained admitted through completion of lymphodepleting chemo and  NK cell infusion.      1. Follicular Lymphoma:   - relapsed after BR, [facility], NAM NK and now . He has had idelalisib as a bridge and responded.  - Collection on 12/13. We discussed the lymphodepleting chemotherapy followed by CAR-T cell infusion.  - We discussed the potential benefits and risks. In the Zuma-5 study there was a response rate of >90%, as well as a potential of CRS and neurotoxicity in about 10-20% of patients. We also discussed side effects including nausea, vomiting, fevers, CRS, neurotoxicity.  - He signed consents for the Yescarta trial as well as the biopsy of the groin lymph node.  - While waiting for the cells to be processed, he will undergo further treatment as per Dr. Terry.  - No BMT follow up-- message sent to Arlen Lui/Mara regarding bridging chemo.     2. HEME: Keep hgb >7g/dL, plt>10,000.   Mild anemia and thrombocytopenia secondary to chemotherapy/lymphoma. No transfusions needed.     3 ID: cough/cold symptoms for last several weeks.   - Covid neg 12/7. No need for nasal swab as it would not change my management.   acyclovir 800mg bid through 1 year as shingles prophylaxis, Bactrim (for 6 months post therapy, then check T cell subsets to determine if he has ongoing need).  - hx C diff colitis   - s/p 3 covid shots; first two were pre- and booster was after.  Defer to car -t team regarding if revaccination needed post CART.   - Rhinovirus+10/6. CXR with mild bronchial thickening but no PNA. IgG low at 240, got IVIG last month. No real indication for continued IVIG replacement.    4. CV: No complaints of chest pain.   Cardiology cleared him to  proceed with no further testing. The CT angiogram showed no lesion requiring stenting or bypass.    5. :   Post radical prostatectomy and bilateral lymph node dissection. November 15, 2017. Prostatic adenocarcinoma Maricel 4+3 = 7 with tertiary pattern 5. Tumor involves 45% of total surface area. Positive for extensive extraprostatic extension. Positive for bilateral seminal vesicle invasion. Multiple margins positive. Lymphovascular invasion not identified. Perineural invasion was noted. Lymph nodes negative. 3 were examined (2 right obturator and 1 left obturator). Completed radiation to the prostate fossa and pelvic lymph nodes at [facility] early May 2018. He received 4500 cGy in 25 fractions. He then had 2340 cGy boost in 13 fractions to the prostatic fossa, for a total dose of 6240 cGy in 38 treatment fractions delivered over 54 days. He did not receive hormonal therapy:    9/10 PSA: 0.71 - no concerns.      6.FEN/Renal:   - Cr mildly elevated.    - Lytes stable.      7. GI:    #intermittent nausea -  medical cannibis.  #Abdominal Pain secondary to cancer. He is in remission, but still has this pain.      8. Dental: s/p root canal; well healed and mouth feels good.     Tentatively schedule for end of December so not lost to follow up, ensure counts stable  Dr Terry to determine if any bridging chemo needed prior to CAR-T    Shannen Bauer PA-C  [phone]              Again, thank you for allowing me to participate in the care of your patient.        Sincerely,        BMT Advanced Practice Provider

## 2021-12-13 NOTE — PROCEDURES
Transfusion Medicine  Apheresis Procedure Note    Juvenal Blake MRN# [id number]   YOB: 1965 Age: 56 year old        Reason for consult: Autologous mononuclear cell (MNC) collection           Assessment and Plan:   The patient is a 56 year old male with NHL (follicular lymphoma) who undergoes autologous MNC collection for CAR-T manufacture ([employer], [place]).  The patient has adequate veins, and they were used for collection.      Today is collection day #1.  He tolerated the procedure well without complication.  He noted some tingling of the lips, but it improved with calcium. Feeling well today, denies nausea, vomiting, fevers, chills.           History of Present Illness:   The patient is a 56 year old male with NHL who presents for autologou MNC collection.  His past medical history includes NHL, prostate cancer (s/p prostatectomy), JAZLYN, and additional history (see PMH, PSH below). The patient denies any back pain that would prevent him from tolerating the procedure.  He has an allergy to ondansetron.  The travel history is negative.  The patient has no identifiable risk factors for infectious disease.  .             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)      Low serum IgG for age      Non Hodgkin's lymphoma (H)      Non Hodgkin's lymphoma (H)      Prostate CA (H)      Shortness of breath      Sleep apnea     CPAP          Past Surgical History:     Past Surgical History:   Procedure Laterality Date     ABDOMEN SURGERY      for pyloric stenosis as an infant     COLONOSCOPY       DISTAL CLAVICLE EXCISION      per H & P      HERNIA REPAIR      inguinal     HERNIA REPAIR, UMBILICAL       INSERT PICC LINE N/A 8/24/2021     Procedure: INSERTION, PICC EXCHANGE, PREVIOUS PICC IS OUT 10 CENTIMETERS;  Surgeon: Christiano Murdock MD;  Location: [facility] OR     IR CHEST PORT PLACEMENT > 5 YRS OF AGE  02/17/2017     IR LYMPH NODE BIOPSY  10/01/2020     IR LYMPH NODE BIOPSY  05/28/2021     IR PICC PLACEMENT > 5 YRS OF AGE  8/24/2021     LAPAROSCOPIC HERNIORRHAPHY INCISIONAL Right 5/28/2020    Procedure: HERNIORRHAPHY, UMBILICAL, LAPAROSCOPIC; AXILLARY LYMPH NODE BIOPSY;  Surgeon: Rob Farrell MD;  Location: [facility];  Service: General     OTHER SURGICAL HISTORY Left     shoulder arthroscopy     OTHER SURGICAL HISTORY  01/2017    port a cath placement     PICC DOUBLE LUMEN PLACEMENT Right 06/27/2021    47cm (3cm external), Basilic vein     NE LAP,PROSTATECTOMY,RADICAL,W/NERVE SPARE,INCL ROBOTIC N/A 11/15/2017    Procedure: ROBOTIC ASSISTED RADICAL RETROPUBIC PROSTATECTOMY BILATERAL PELVIC LYMPH NODE DISSECTION ;  Surgeon: Ezio Steele MD;  Location[facility];  Service: Urology     TONSILLECTOMY       US LYMPH NODE BIOPSY  1/7/2019          Social History:   , former smoker          Allergies:     Allergies   Allergen Reactions     Ondansetron Itching          Medications:     Current Outpatient Medications   Medication     acyclovir (ZOVIRAX) 800 MG tablet     albuterol (VENTOLIN HFA) 108 (90 Base) MCG/ACT inhaler     beclomethasone HFA (QVAR REDIHALER) 80 MCG/ACT inhaler     guaiFENesin-codeine (ROBITUSSIN AC) 100-10 MG/5ML solution     omeprazole (PRILOSEC) 20 MG DR capsule     Psyllium (METAMUCIL FIBER) 51.7 % PACK     sulfamethoxazole-trimethoprim (BACTRIM DS) 800-160 MG tablet     tolterodine ER (DETROL LA) 4 MG 24 hr capsule     medical cannabis (Patient's own supply)     No current facility-administered medications for this encounter.          Review of Systems:   See above           Exam:     Vitals:    12/13/21 0827   BP: 134/68   Pulse: 69   Resp: 18   Temp: 99.3  F (37.4  C)   TempSrc: Oral  "  Weight: 105.2 kg (231 lb 14.8 oz)   Height: 1.76 m (5' 9.29\")     Alert, no apparent distress  Breathing appears comfortable on room air  Peripheral IV access for the procedure.             Data:     BMP  Recent Labs   Lab 12/07/21  1318      POTASSIUM 4.0   CHLORIDE 108   TRE 9.1   CO2 27   BUN 12   CR 1.31*   *     CBC  Recent Labs   Lab 12/13/21  0915 12/07/21  1318   WBC 5.6 5.0   RBC 3.47* 3.46*   HGB 11.6* 11.3*   HCT 34.0* 33.4*   MCV 98 97   MCH 33.4* 32.7   MCHC 34.1 33.8   RDW 13.7 13.3   * 120*     Results for orders placed or performed during the hospital encounter of 12/13/21 (from the past 24 hour(s))   CBC with platelets differential    Narrative    The following orders were created for panel order CBC with platelets differential.  Procedure                               Abnormality         Status                     ---------                               -----------         ------                     CBC with platelets and d...[[id number]]  Abnormal            Final result                 Please view results for these tests on the individual orders.   CBC with platelets and differential   Result Value Ref Range    WBC Count 5.6 4.0 - 11.0 10e3/uL    RBC Count 3.47 (L) 4.40 - 5.90 10e6/uL    Hemoglobin 11.6 (L) 13.3 - 17.7 g/dL    Hematocrit 34.0 (L) 40.0 - 53.0 %    MCV 98 78 - 100 fL    MCH 33.4 (H) 26.5 - 33.0 pg    MCHC 34.1 31.5 - 36.5 g/dL    RDW 13.7 10.0 - 15.0 %    Platelet Count 122 (L) 150 - 450 10e3/uL    % Neutrophils 81 %    % Lymphocytes 7 %    % Monocytes 11 %    % Eosinophils 1 %    % Basophils 0 %    % Immature Granulocytes 0 %    NRBCs per 100 WBC 0 <1 /100    Absolute Neutrophils 4.5 1.6 - 8.3 10e3/uL    Absolute Lymphocytes 0.4 (L) 0.8 - 5.3 10e3/uL    Absolute Monocytes 0.6 0.0 - 1.3 10e3/uL    Absolute Eosinophils 0.1 0.0 - 0.7 10e3/uL    Absolute Basophils 0.0 0.0 - 0.2 10e3/uL    Absolute Immature Granulocytes 0.0 <=0.4 10e3/uL    Absolute NRBCs 0.0 " 10e3/uL              Procedure Summary:   Mononuclear cell collection was performed on the Amicus device. Peripheral veins were used for access and allowed for appropriate flow during the procedure.  ACD-A was used for anticoagulation. Calcium gluconate was given in the return line to offset the effects of the citrate.   The patient's vital signs were stable throughout.  The patient tolerated the procedure well without complication.  See apheresis flowsheet for additional details.      Attestation: During the procedure the patient was directly seen and evaluated by me, Eugenio Pierre MD.    Eugenio Pierre MD  Transfusion Medicine Attending  Laboratory Medicine & Pathology  Pager: [phone]

## 2021-12-13 NOTE — PROGRESS NOTES
BMT Clinic Note     Juvenal Blake is a 55 year old male PMH significant for refractory NHL, who is being considered for [facility] CAR-T cellular therapy.      HPI:   Feels well. Since his last follow up, he is overall doing well. The lymph node on the inner thihg is still there, maybe slightly larger. Has had cough,cold symptoms for past few weeks but covid negative. Denies sob and fevers. No sob. He understands that takes a few weeks to manufacture cells. He has been feeling well.     ROS: 14 point ROS negative except as above.      Physical Exam:   vitals reviewed.   There were no vitals taken for this visit.  GA: NAD. Appears as stated age.  HEENT: PERRL, OP Clear  Neck: Supple, no JVD  CV: RRR, no mrg  Lungs: CTAB, no wheezes  Abd: Soft, nt, nd  Lymph: did not exam as pt laying in aphresis bed.   Ext: 1+ edema. Warm  Neuro: AAO, moving all ext. Symmetric face  Skin: warm, no rashes  Psyc: Appropriate and cooperative  Access: Large bore peripheral IV in bilateral arms for apheresis.                                                                                                                                                                   ECO    Labs:  Lab Results   Component Value Date    WBC 5.0 2021    ANEU 0.6 (L) 2021    HGB 11.3 (L) 2021    HCT 33.4 (L) 2021     (L) 2021     2021    POTASSIUM 4.0 2021    CHLORIDE 108 2021    CO2 27 2021     (H) 2021    BUN 12 2021    CR 1.31 (H) 2021    MAG 2.0 2021    INR 0.98 2021    BILITOTAL 0.4 2021    AST 22 2021    ALT 52 2021    ALKPHOS 108 2021    PROTTOTAL 6.6 (L) 2021    ALBUMIN 3.7 2021       Assessment and Plan:   Juvenal Blake is a 55 year old male PMH significant for refractory NHL, undergoing  NK infusion. Admitted with rituxan (biosimilar) infusion reaction  and remained admitted through completion of  lymphodepleting chemo and  NK cell infusion.      1. Follicular Lymphoma:   - relapsed after BR, [facility], NAM NK and now . He has had idelalisib as a bridge and responded.  - Collection on 12/13. We discussed the lymphodepleting chemotherapy followed by CAR-T cell infusion.  - We discussed the potential benefits and risks. In the Zuma-5 study there was a response rate of >90%, as well as a potential of CRS and neurotoxicity in about 10-20% of patients. We also discussed side effects including nausea, vomiting, fevers, CRS, neurotoxicity.  - He signed consents for the Yescarta trial as well as the biopsy of the groin lymph node.  - While waiting for the cells to be processed, he will undergo further treatment as per Dr. Terry.  - No BMT follow up-- message sent to Arlen Lui/Mara regarding bridging chemo.     2. HEME: Keep hgb >7g/dL, plt>10,000.   Mild anemia and thrombocytopenia secondary to chemotherapy/lymphoma. No transfusions needed.     3 ID: cough/cold symptoms for last several weeks.   - Covid neg 12/7. No need for nasal swab as it would not change my management.   acyclovir 800mg bid through 1 year as shingles prophylaxis, Bactrim (for 6 months post therapy, then check T cell subsets to determine if he has ongoing need).  - hx C diff colitis   - s/p 3 covid shots; first two were pre- and booster was after.  Defer to car -t team regarding if revaccination needed post CART.   - Rhinovirus+10/6. CXR with mild bronchial thickening but no PNA. IgG low at 240, got IVIG last month. No real indication for continued IVIG replacement.    4. CV: No complaints of chest pain.   Cardiology cleared him to proceed with no further testing. The CT angiogram showed no lesion requiring stenting or bypass.    5. :   Post radical prostatectomy and bilateral lymph node dissection. November 15, 2017. Prostatic adenocarcinoma Maricel 4+3 = 7 with tertiary pattern 5. Tumor involves 45% of total surface  area. Positive for extensive extraprostatic extension. Positive for bilateral seminal vesicle invasion. Multiple margins positive. Lymphovascular invasion not identified. Perineural invasion was noted. Lymph nodes negative. 3 were examined (2 right obturator and 1 left obturator). Completed radiation to the prostate fossa and pelvic lymph nodes at [facility] early May 2018. He received 4500 cGy in 25 fractions. He then had 2340 cGy boost in 13 fractions to the prostatic fossa, for a total dose of 6240 cGy in 38 treatment fractions delivered over 54 days. He did not receive hormonal therapy:    9/10 PSA: 0.71 - no concerns.      6.FEN/Renal:   - Cr mildly elevated.    - Lytes stable.      7. GI:    #intermittent nausea -  medical cannibis.  #Abdominal Pain secondary to cancer. He is in remission, but still has this pain.      8. Dental: s/p root canal; well healed and mouth feels good.     Tentatively schedule for end of December so not lost to follow up, ensure counts stable  Dr Terry to determine if any bridging chemo needed prior to CAR-T    Shannen Bauer PA-C  [phone]

## 2021-12-13 NOTE — DISCHARGE INSTRUCTIONS
Autologous HPC/MNC Collection:   In most cases, the cell dose report will be available tomorrow morning.   The Bone Marrow Transplant (BMT) clinic staff looks at your report, and a decision is made if you will need another collection.   Remember it is important to follow a low fat diet during the collection process. Sometimes following the procedure, your blood platelet count may be low.  If you are told your platelet count is low, you need to avoid taking aspirin/aspirin containing products and avoid heavy physical activity and activities that may result in bruising or traumatic injury.  To contact the BMT fellow or attending physician after 5 p.m. call [phone].

## 2021-12-13 NOTE — PROGRESS NOTES
[id number] : Informed Consent Note     Subject reviewed informed consent form for optional tumor biopsy with Dr. Zavala on 12/7/2021 and signed consent at that time with Dr. Zavala. Initial on Page 3 of the HIPPA consent was missed by both Dr. Montes and Juvenal. The missed initial was in regards to personal information being used for the optional tumor research biopsy. This writer approached Juvenal on 12/13/2021 and provided information on the missed initial. He agreed to initial and would like to proceed with the biopsy.     Present during the discussion were Juvenal and this writer. A copy of the signed form was provided to the patient. No procedures specific to this study were performed prior to the patient signing the consent form.    Consent Version Date: August 4, 2021  Consent obtained by: Scott Zavala    Date: 12/7/2021  HIPAA authorization signed?: yes  HIPAA authorization version date: 01/24/2020  Missed Initial on HIPPA obtained by Emilia Castillo RN Research Nurse on 12/13/2021    Emilia Castillo RN    Form 503.03.01 (Version 2)     Effective date: 01AUG2018     Next Review Date: 01AUG2020

## 2021-12-14 NOTE — DISCHARGE SUMMARY
[facility] Discharge Summary   Juvenal Blake MRN# [id number]   Age: 54 year old  YOB: 1965   Date of Admission: 9/1/2020  Date of Discharge:  9/5/2020  Admitting Physician: Rosa Terry MD  Discharge Physician:  Stacy Sutherland MD  Discharge Diagnoses:    1. S/p NK cell therapy for follicular lymphoma  2. Hx of prostate cancer  3. Pancytopenia due to chemo  Discharge Medications:       Juvenal Blake   Home Medication Instructions JONNY:[id number]    Printed on:09/04/20 6181   Medication Information                      acyclovir (ZOVIRAX) 400 MG tablet  Take 1 tablet (400 mg) by mouth every 12 hours             allopurinol (ZYLOPRIM) 300 MG tablet  Take 1 tablet (300 mg) by mouth daily for 2 days Through 9/7, then stop on 9/8             fluconazole (DIFLUCAN) 100 MG tablet  Take 1 tablet (100 mg) by mouth daily             hydrOXYzine (ATARAX) 50 MG tablet  Take 1 tablet (50 mg) by mouth 3 times daily as needed for itching             LORazepam (ATIVAN) 0.5 MG tablet  Take 1 tablet (0.5 mg) by mouth every 4 hours as needed (Anxiety, Nausea/Vomiting or Sleep)             omeprazole (PRILOSEC) 20 MG DR capsule  Take 1 capsule (20 mg) by mouth daily             prochlorperazine (COMPAZINE) 5 MG tablet  Take 1 tablet (5 mg) by mouth 4 times daily (before meals and nightly)             Psyllium (METAMUCIL FIBER) 51.7 % PACK  Take 1 packet by mouth daily              sulfamethoxazole-trimethoprim (BACTRIM DS) 800-160 MG tablet  Take 1 tablet by mouth Every Mon, Tues two times daily             tolterodine ER (DETROL LA) 4 MG 24 hr capsule  Take 4 mg by mouth daily                Brief History of Illness:    **Adopted from H&P    Juvenal Blake is a 55yo M with follicular lymphoma. He was originally diagnosed in 2010 with a grade I-II follicular lymphoma. He was stage IV at diagnosis with marrow involvement. He was treated with bendamustine and Rituxan and achieved a CR. He then  unfortunately relapsed in 12/2016 with a low-grade follicular lymphoma again.  He was again treated with Rituxan and bendamustine and again had achieved a complete remission.  He had a second relapse then in 01/2019 for which he was treated with O-CHOP and again achieved a CR.  Imaging in 04/2020 showed adenopathy above and below the diaphragm.  He had an axillary node biopsied and this again showed a grade I-II follicular lymphoma which expressed CD19 and CD20.  The Ki-67 was 20%.        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  His most recent PSA was low, and he has had no recurrent symptoms.  Interestingly, he also has a history of hypogammaglobulinemia and has received intermittent doses of IVIG over the last year and a half or so.     Juvenal is being admitted today for NAM-NK cell therapy. He already completed LD chemo outpatient and today is Day 0. He has some nausea which is best controlled with compazine. No fevers or infectious concerns. Able to maintain appropriate intake. No diarrhea. Skin is itchy, scratching legs. No visible rash. PICC placed today in left arm, not painful.    Hospital Course:    Juvenal Blake is a 54 year old male with follicular lymphoma admitted for NAM NK cell therapy. He tolerated cell infusion and IL-2 well. He has not had a fever. Had one emesis after the first IL-2 injection. Slight redness at first injection site, but not bothersome to him. He will be discharged after his third IL-2 injection and follow up in the BMT clinic next week.     Day 0 - NK cells + IL-2  Day 1 rest day  Day 2 NK cells + IL-2  Day 3 rest day (no tissue bx or bmbx needed)  Day 4 = IL-2, can be given early in the day then patient can be discharged 4 hours after dose has been given  Day 10 = rituxan     1.  BMT/Cellular therapy: NAM NK for  follicular lymphoma  - Allopurinol through day 7  - Flush and pre-meds prior to cell infusion  - GCSF - ok to start after D+14 if ANC < 500; cont until ANC > 1500 x 2 days  - D+28 PET scan     2.  HEME: Keep Hgb>7 and plts>10K. No pre-meds.                            3.  ID:   - prophy fluc, LD ACV (CMV+, HSV+, EBV+) and bactrim. Would add leva if he becomes neutropenic  - Hx hypogammaglobulinemia, has received intermittent IVIG                              4.  GI:  - added scheduled compazine QID for ongoing nausea after LD chemo  - Ativan and compazine prn, compazine works better for him  - Protonix for GI prophy  - Psyllium daily     5.  FEN/Renal:   - Monitor creat and lytes. Replete lytes PRN per SS.   - Monitor weight, I+O, lytes per protocol with IVF flush.     6.   - Hx prostate cancer s/p radical prostatectomy with positive margins + radiation through May 2018  - continue tolterodine     7. Derm  - atarax prn for itching. Doesn't look like hives or drug allergy. Will watch for now    Discharge Instructions and Follow-Up:    Discharge diet: Regular diet as tolerated  Discharge activity: Activity as tolerated   Discharge follow-up: Follow up with BMT Clinic as follows:  1. Tuesday, 9/8 check in at 9:30am for labs and 10am for provider visit    Discharge Disposition:    Discharged to home.          BMT Staff:  The patient was discussed on morning rounds with the nurses, mid level provider, and house staff and seen and examined by me. All labs and imaging were reviewed. I reviewed events over the last 24 hours including vitals and flow sheets. I agree with the above note and have been responsible for the care plan and interpretation of progress.    Subjective:  Reports feeling overall well, well controlled N, no abd pain, no F/C/NS, no N/V/C/D, no  complaints, no URI or other respiratory symptoms, no HA/LH/Dizziness.     Vitals reviewed, labs reviewed  PE:  NAD, Alert, oriented x3  HEENT: moist mmm, no oral  ulcers  Heart: RRR  Lungs: CTAB  Abdomen: +BS, soft non tender, non distended, IL-2 injection site erythema on Left LQ  LE: no edema  Skin: no rashes    Assessment and Plan:  54 year old male with FL here for NAM NK Day +4. Monitor for toxicities, non to date. Nausea controlled. ID prophylaxis. Patient is looking forward to leaving today, is comfortable with follow up plan and schedule.  > 30 min spent in coordination of care for discharge     Stacy Sutherland MD             15-Dec-2021 00:20

## 2021-12-15 ENCOUNTER — RESEARCH ENCOUNTER (OUTPATIENT)
Dept: TRANSPLANT | Facility: CLINIC | Age: 56
End: 2021-12-15
Payer: COMMERCIAL

## 2021-12-15 NOTE — PROGRESS NOTES
Called patient to notify him of date change for  tumor biopsy. Biopsy will now be performed on 12/20 @ 0930. Was instructed to report to [facility] for check in. Research samples to be obtained at that time. He was told that BMT office will be in contact with him regarding starting bridging chemotherapy for CAR-T which will begin after biopsy is completed. All questions were answered.  He was given this writer's contact information for any follow up questions he has.

## 2021-12-16 ENCOUNTER — TELEPHONE (OUTPATIENT)
Dept: INTERVENTIONAL RADIOLOGY/VASCULAR | Facility: CLINIC | Age: 56
End: 2021-12-16
Payer: COMMERCIAL

## 2021-12-16 DIAGNOSIS — C82.08 FOLLICULAR LYMPHOMA GRADE I, LYMPH NODES OF MULTIPLE SITES (H): Primary | ICD-10-CM

## 2021-12-16 RX ORDER — METHYLPREDNISOLONE SODIUM SUCCINATE 125 MG/2ML
125 INJECTION, POWDER, LYOPHILIZED, FOR SOLUTION INTRAMUSCULAR; INTRAVENOUS
Status: CANCELLED
Start: 2021-12-22

## 2021-12-16 RX ORDER — METHYLPREDNISOLONE SODIUM SUCCINATE 125 MG/2ML
125 INJECTION, POWDER, LYOPHILIZED, FOR SOLUTION INTRAMUSCULAR; INTRAVENOUS
Status: CANCELLED
Start: 2021-12-21

## 2021-12-16 RX ORDER — MEPERIDINE HYDROCHLORIDE 25 MG/ML
25 INJECTION INTRAMUSCULAR; INTRAVENOUS; SUBCUTANEOUS EVERY 30 MIN PRN
Status: CANCELLED | OUTPATIENT
Start: 2021-12-21

## 2021-12-16 RX ORDER — DIPHENHYDRAMINE HYDROCHLORIDE 50 MG/ML
50 INJECTION INTRAMUSCULAR; INTRAVENOUS
Status: CANCELLED
Start: 2021-12-21

## 2021-12-16 RX ORDER — NALOXONE HYDROCHLORIDE 0.4 MG/ML
0.2 INJECTION, SOLUTION INTRAMUSCULAR; INTRAVENOUS; SUBCUTANEOUS
Status: CANCELLED | OUTPATIENT
Start: 2021-12-22

## 2021-12-16 RX ORDER — METHYLPREDNISOLONE SODIUM SUCCINATE 125 MG/2ML
125 INJECTION, POWDER, LYOPHILIZED, FOR SOLUTION INTRAMUSCULAR; INTRAVENOUS
Status: CANCELLED | OUTPATIENT
Start: 2021-12-21

## 2021-12-16 RX ORDER — HEPARIN SODIUM,PORCINE 10 UNIT/ML
5 VIAL (ML) INTRAVENOUS
Status: CANCELLED | OUTPATIENT
Start: 2021-12-21

## 2021-12-16 RX ORDER — ALBUTEROL SULFATE 90 UG/1
1-2 AEROSOL, METERED RESPIRATORY (INHALATION)
Status: CANCELLED
Start: 2021-12-22

## 2021-12-16 RX ORDER — DIPHENHYDRAMINE HCL 25 MG
50 CAPSULE ORAL ONCE
Status: CANCELLED | OUTPATIENT
Start: 2021-12-21

## 2021-12-16 RX ORDER — HEPARIN SODIUM (PORCINE) LOCK FLUSH IV SOLN 100 UNIT/ML 100 UNIT/ML
5 SOLUTION INTRAVENOUS
Status: CANCELLED | OUTPATIENT
Start: 2021-12-21

## 2021-12-16 RX ORDER — ALBUTEROL SULFATE 0.83 MG/ML
2.5 SOLUTION RESPIRATORY (INHALATION)
Status: CANCELLED | OUTPATIENT
Start: 2021-12-21

## 2021-12-16 RX ORDER — LORAZEPAM 2 MG/ML
0.5 INJECTION INTRAMUSCULAR EVERY 4 HOURS PRN
Status: CANCELLED | OUTPATIENT
Start: 2021-12-22

## 2021-12-16 RX ORDER — EPINEPHRINE 1 MG/ML
0.3 INJECTION, SOLUTION INTRAMUSCULAR; SUBCUTANEOUS EVERY 5 MIN PRN
Status: CANCELLED | OUTPATIENT
Start: 2021-12-21

## 2021-12-16 RX ORDER — MEPERIDINE HYDROCHLORIDE 25 MG/ML
25 INJECTION INTRAMUSCULAR; INTRAVENOUS; SUBCUTANEOUS EVERY 30 MIN PRN
Status: CANCELLED | OUTPATIENT
Start: 2021-12-22

## 2021-12-16 RX ORDER — ALBUTEROL SULFATE 0.83 MG/ML
2.5 SOLUTION RESPIRATORY (INHALATION)
Status: CANCELLED | OUTPATIENT
Start: 2021-12-22

## 2021-12-16 RX ORDER — NALOXONE HYDROCHLORIDE 0.4 MG/ML
0.2 INJECTION, SOLUTION INTRAMUSCULAR; INTRAVENOUS; SUBCUTANEOUS
Status: CANCELLED | OUTPATIENT
Start: 2021-12-21

## 2021-12-16 RX ORDER — METHYLPREDNISOLONE SODIUM SUCCINATE 125 MG/2ML
125 INJECTION, POWDER, LYOPHILIZED, FOR SOLUTION INTRAMUSCULAR; INTRAVENOUS ONCE
Status: CANCELLED
Start: 2021-12-21 | End: 2021-12-21

## 2021-12-16 RX ORDER — EPINEPHRINE 1 MG/ML
0.3 INJECTION, SOLUTION INTRAMUSCULAR; SUBCUTANEOUS EVERY 5 MIN PRN
Status: CANCELLED | OUTPATIENT
Start: 2021-12-22

## 2021-12-16 RX ORDER — HEPARIN SODIUM (PORCINE) LOCK FLUSH IV SOLN 100 UNIT/ML 100 UNIT/ML
5 SOLUTION INTRAVENOUS
Status: CANCELLED | OUTPATIENT
Start: 2021-12-22

## 2021-12-16 RX ORDER — LORAZEPAM 2 MG/ML
0.5 INJECTION INTRAMUSCULAR EVERY 4 HOURS PRN
Status: CANCELLED | OUTPATIENT
Start: 2021-12-21

## 2021-12-16 RX ORDER — ALBUTEROL SULFATE 90 UG/1
1-2 AEROSOL, METERED RESPIRATORY (INHALATION)
Status: CANCELLED
Start: 2021-12-21

## 2021-12-16 RX ORDER — HEPARIN SODIUM,PORCINE 10 UNIT/ML
5 VIAL (ML) INTRAVENOUS
Status: CANCELLED | OUTPATIENT
Start: 2021-12-22

## 2021-12-16 RX ORDER — DIPHENHYDRAMINE HYDROCHLORIDE 50 MG/ML
50 INJECTION INTRAMUSCULAR; INTRAVENOUS
Status: CANCELLED
Start: 2021-12-22

## 2021-12-16 RX ORDER — ACETAMINOPHEN 325 MG/1
650 TABLET ORAL ONCE
Status: CANCELLED | OUTPATIENT
Start: 2021-12-21

## 2021-12-16 RX ORDER — MEPERIDINE HYDROCHLORIDE 25 MG/ML
25 INJECTION INTRAMUSCULAR; INTRAVENOUS; SUBCUTANEOUS
Status: CANCELLED | OUTPATIENT
Start: 2021-12-21

## 2021-12-17 ENCOUNTER — LAB (OUTPATIENT)
Dept: LAB | Facility: CLINIC | Age: 56
End: 2021-12-17
Payer: COMMERCIAL

## 2021-12-17 DIAGNOSIS — Z11.59 ENCOUNTER FOR SCREENING FOR OTHER VIRAL DISEASES: ICD-10-CM

## 2021-12-17 PROCEDURE — U0005 INFEC AGEN DETEC AMPLI PROBE: HCPCS

## 2021-12-17 PROCEDURE — U0003 INFECTIOUS AGENT DETECTION BY NUCLEIC ACID (DNA OR RNA); SEVERE ACUTE RESPIRATORY SYNDROME CORONAVIRUS 2 (SARS-COV-2) (CORONAVIRUS DISEASE [COVID-19]), AMPLIFIED PROBE TECHNIQUE, MAKING USE OF HIGH THROUGHPUT TECHNOLOGIES AS DESCRIBED BY CMS-2020-01-R: HCPCS

## 2021-12-18 LAB — SARS-COV-2 RNA RESP QL NAA+PROBE: NEGATIVE

## 2021-12-20 ENCOUNTER — APPOINTMENT (OUTPATIENT)
Dept: MEDSURG UNIT | Facility: CLINIC | Age: 56
End: 2021-12-20
Payer: COMMERCIAL

## 2021-12-20 ENCOUNTER — TELEPHONE (OUTPATIENT)
Dept: TRANSPLANT | Facility: CLINIC | Age: 56
End: 2021-12-20

## 2021-12-20 ENCOUNTER — HOSPITAL ENCOUNTER (OUTPATIENT)
Facility: CLINIC | Age: 56
Discharge: HOME | End: 2021-12-20
Admitting: PHYSICIAN ASSISTANT
Payer: COMMERCIAL

## 2021-12-20 ENCOUNTER — APPOINTMENT (OUTPATIENT)
Dept: INTERVENTIONAL RADIOLOGY/VASCULAR | Facility: CLINIC | Age: 56
End: 2021-12-20
Payer: COMMERCIAL

## 2021-12-20 ENCOUNTER — CARE COORDINATION (OUTPATIENT)
Dept: TRANSPLANT | Facility: CLINIC | Age: 56
End: 2021-12-20

## 2021-12-20 VITALS
WEIGHT: 235 LBS | RESPIRATION RATE: 14 BRPM | TEMPERATURE: 98.3 F | HEART RATE: 72 BPM | HEIGHT: 69 IN | OXYGEN SATURATION: 95 % | BODY MASS INDEX: 34.8 KG/M2 | SYSTOLIC BLOOD PRESSURE: 136 MMHG | DIASTOLIC BLOOD PRESSURE: 82 MMHG

## 2021-12-20 DIAGNOSIS — C82.08 FOLLICULAR LYMPHOMA GRADE I, LYMPH NODES OF MULTIPLE SITES (H): ICD-10-CM

## 2021-12-20 DIAGNOSIS — Z86.2 PERSONAL HISTORY OF DISEASES OF BLOOD AND BLOOD-FORMING ORGANS: ICD-10-CM

## 2021-12-20 LAB
ALBUMIN SERPL-MCNC: 3.8 G/DL (ref 3.4–5)
ALP SERPL-CCNC: 98 U/L (ref 40–150)
ALT SERPL W P-5'-P-CCNC: 55 U/L (ref 0–70)
ANION GAP SERPL CALCULATED.3IONS-SCNC: 7 MMOL/L (ref 3–14)
AST SERPL W P-5'-P-CCNC: 33 U/L (ref 0–45)
BASOPHILS # BLD AUTO: 0 10E3/UL (ref 0–0.2)
BASOPHILS NFR BLD AUTO: 1 %
BILIRUB SERPL-MCNC: 1 MG/DL (ref 0.2–1.3)
BUN SERPL-MCNC: 12 MG/DL (ref 7–30)
CALCIUM SERPL-MCNC: 8.9 MG/DL (ref 8.5–10.1)
CHLORIDE BLD-SCNC: 108 MMOL/L (ref 94–109)
CO2 SERPL-SCNC: 26 MMOL/L (ref 20–32)
CREAT SERPL-MCNC: 1.06 MG/DL (ref 0.66–1.25)
EOSINOPHIL # BLD AUTO: 0.1 10E3/UL (ref 0–0.7)
EOSINOPHIL NFR BLD AUTO: 2 %
ERYTHROCYTE [DISTWIDTH] IN BLOOD BY AUTOMATED COUNT: 14 % (ref 10–15)
GFR SERPL CREATININE-BSD FRML MDRD: 77 ML/MIN/1.73M2
GLUCOSE BLD-MCNC: 111 MG/DL (ref 70–99)
HCT VFR BLD AUTO: 32.7 % (ref 40–53)
HGB BLD-MCNC: 11 G/DL (ref 13.3–17.7)
IMM GRANULOCYTES # BLD: 0 10E3/UL
IMM GRANULOCYTES NFR BLD: 1 %
LYMPHOCYTES # BLD AUTO: 0.4 10E3/UL (ref 0.8–5.3)
LYMPHOCYTES NFR BLD AUTO: 10 %
MCH RBC QN AUTO: 33.5 PG (ref 26.5–33)
MCHC RBC AUTO-ENTMCNC: 33.6 G/DL (ref 31.5–36.5)
MCV RBC AUTO: 100 FL (ref 78–100)
MONOCYTES # BLD AUTO: 0.4 10E3/UL (ref 0–1.3)
MONOCYTES NFR BLD AUTO: 11 %
NEUTROPHILS # BLD AUTO: 2.5 10E3/UL (ref 1.6–8.3)
NEUTROPHILS NFR BLD AUTO: 75 %
NRBC # BLD AUTO: 0 10E3/UL
NRBC BLD AUTO-RTO: 0 /100
PLATELET # BLD AUTO: 108 10E3/UL (ref 150–450)
POTASSIUM BLD-SCNC: 4 MMOL/L (ref 3.4–5.3)
PROT SERPL-MCNC: 6.7 G/DL (ref 6.8–8.8)
RBC # BLD AUTO: 3.28 10E6/UL (ref 4.4–5.9)
SODIUM SERPL-SCNC: 141 MMOL/L (ref 133–144)
WBC # BLD AUTO: 3.4 10E3/UL (ref 4–11)

## 2021-12-20 PROCEDURE — 88185 FLOWCYTOMETRY/TC ADD-ON: CPT | Performed by: NURSE PRACTITIONER

## 2021-12-20 PROCEDURE — 258N000003 HC RX IP 258 OP 636: Performed by: NURSE PRACTITIONER

## 2021-12-20 PROCEDURE — 250N000011 HC RX IP 250 OP 636: Performed by: STUDENT IN AN ORGANIZED HEALTH CARE EDUCATION/TRAINING PROGRAM

## 2021-12-20 PROCEDURE — 250N000009 HC RX 250: Performed by: STUDENT IN AN ORGANIZED HEALTH CARE EDUCATION/TRAINING PROGRAM

## 2021-12-20 PROCEDURE — 38505 NEEDLE BIOPSY LYMPH NODES: CPT

## 2021-12-20 PROCEDURE — 88305 TISSUE EXAM BY PATHOLOGIST: CPT | Mod: 26 | Performed by: STUDENT IN AN ORGANIZED HEALTH CARE EDUCATION/TRAINING PROGRAM

## 2021-12-20 PROCEDURE — 88333 PATH CONSLTJ SURG CYTO XM 1: CPT | Mod: TC | Performed by: NURSE PRACTITIONER

## 2021-12-20 PROCEDURE — 38505 NEEDLE BIOPSY LYMPH NODES: CPT | Mod: RT | Performed by: PHYSICIAN ASSISTANT

## 2021-12-20 PROCEDURE — 88342 IMHCHEM/IMCYTCHM 1ST ANTB: CPT | Mod: TC,XU | Performed by: NURSE PRACTITIONER

## 2021-12-20 PROCEDURE — 82040 ASSAY OF SERUM ALBUMIN: CPT

## 2021-12-20 PROCEDURE — 88184 FLOWCYTOMETRY/ TC 1 MARKER: CPT | Performed by: NURSE PRACTITIONER

## 2021-12-20 PROCEDURE — 99153 MOD SED SAME PHYS/QHP EA: CPT

## 2021-12-20 PROCEDURE — 76942 ECHO GUIDE FOR BIOPSY: CPT | Mod: 26 | Performed by: PHYSICIAN ASSISTANT

## 2021-12-20 PROCEDURE — 88360 TUMOR IMMUNOHISTOCHEM/MANUAL: CPT | Mod: 26 | Performed by: STUDENT IN AN ORGANIZED HEALTH CARE EDUCATION/TRAINING PROGRAM

## 2021-12-20 PROCEDURE — 88184 FLOWCYTOMETRY/ TC 1 MARKER: CPT | Performed by: PATHOLOGY

## 2021-12-20 PROCEDURE — 88342 IMHCHEM/IMCYTCHM 1ST ANTB: CPT | Mod: 26 | Performed by: STUDENT IN AN ORGANIZED HEALTH CARE EDUCATION/TRAINING PROGRAM

## 2021-12-20 PROCEDURE — 250N000011 HC RX IP 250 OP 636

## 2021-12-20 PROCEDURE — 99152 MOD SED SAME PHYS/QHP 5/>YRS: CPT | Performed by: PHYSICIAN ASSISTANT

## 2021-12-20 PROCEDURE — 999N000142 HC STATISTIC PROCEDURE PREP ONLY

## 2021-12-20 PROCEDURE — 99152 MOD SED SAME PHYS/QHP 5/>YRS: CPT

## 2021-12-20 PROCEDURE — 88341 IMHCHEM/IMCYTCHM EA ADD ANTB: CPT | Mod: 26 | Performed by: STUDENT IN AN ORGANIZED HEALTH CARE EDUCATION/TRAINING PROGRAM

## 2021-12-20 PROCEDURE — 85025 COMPLETE CBC W/AUTO DIFF WBC: CPT

## 2021-12-20 PROCEDURE — 88333 PATH CONSLTJ SURG CYTO XM 1: CPT | Mod: 26 | Performed by: STUDENT IN AN ORGANIZED HEALTH CARE EDUCATION/TRAINING PROGRAM

## 2021-12-20 PROCEDURE — 36591 DRAW BLOOD OFF VENOUS DEVICE: CPT

## 2021-12-20 PROCEDURE — 88189 FLOWCYTOMETRY/READ 16 & >: CPT | Performed by: PATHOLOGY

## 2021-12-20 PROCEDURE — 272N000505 IR LYMPH NODE BIOPSY

## 2021-12-20 RX ORDER — FENTANYL CITRATE 50 UG/ML
25-50 INJECTION, SOLUTION INTRAMUSCULAR; INTRAVENOUS EVERY 5 MIN PRN
Status: DISCONTINUED | OUTPATIENT
Start: 2021-12-20 | End: 2021-12-20 | Stop reason: HOSPADM

## 2021-12-20 RX ORDER — NALOXONE HYDROCHLORIDE 0.4 MG/ML
0.2 INJECTION, SOLUTION INTRAMUSCULAR; INTRAVENOUS; SUBCUTANEOUS
Status: DISCONTINUED | OUTPATIENT
Start: 2021-12-20 | End: 2021-12-20 | Stop reason: HOSPADM

## 2021-12-20 RX ORDER — PROCHLORPERAZINE MALEATE 10 MG
10 TABLET ORAL EVERY 6 HOURS PRN
Qty: 30 TABLET | Refills: 5 | Status: SHIPPED | OUTPATIENT
Start: 2021-12-20 | End: 2022-01-04

## 2021-12-20 RX ORDER — FLUMAZENIL 0.1 MG/ML
0.2 INJECTION, SOLUTION INTRAVENOUS
Status: DISCONTINUED | OUTPATIENT
Start: 2021-12-20 | End: 2021-12-20 | Stop reason: HOSPADM

## 2021-12-20 RX ORDER — ONDANSETRON 8 MG/1
8 TABLET, FILM COATED ORAL EVERY 8 HOURS PRN
Qty: 30 TABLET | Refills: 5 | Status: SHIPPED | OUTPATIENT
Start: 2021-12-20 | End: 2021-12-21

## 2021-12-20 RX ORDER — LIDOCAINE 40 MG/G
CREAM TOPICAL
Status: DISCONTINUED | OUTPATIENT
Start: 2021-12-20 | End: 2021-12-20 | Stop reason: HOSPADM

## 2021-12-20 RX ORDER — LORAZEPAM 0.5 MG/1
0.5 TABLET ORAL EVERY 4 HOURS PRN
Qty: 30 TABLET | Refills: 5 | Status: SHIPPED | OUTPATIENT
Start: 2021-12-20 | End: 2022-01-04

## 2021-12-20 RX ORDER — SODIUM CHLORIDE 9 MG/ML
INJECTION, SOLUTION INTRAVENOUS CONTINUOUS
Status: DISCONTINUED | OUTPATIENT
Start: 2021-12-20 | End: 2021-12-20 | Stop reason: HOSPADM

## 2021-12-20 RX ORDER — NALOXONE HYDROCHLORIDE 0.4 MG/ML
0.4 INJECTION, SOLUTION INTRAMUSCULAR; INTRAVENOUS; SUBCUTANEOUS
Status: DISCONTINUED | OUTPATIENT
Start: 2021-12-20 | End: 2021-12-20 | Stop reason: HOSPADM

## 2021-12-20 RX ORDER — HEPARIN SODIUM (PORCINE) LOCK FLUSH IV SOLN 100 UNIT/ML 100 UNIT/ML
5 SOLUTION INTRAVENOUS ONCE
Status: COMPLETED | OUTPATIENT
Start: 2021-12-20 | End: 2021-12-20

## 2021-12-20 RX ADMIN — LIDOCAINE HYDROCHLORIDE 6 ML: 10 INJECTION, SOLUTION EPIDURAL; INFILTRATION; INTRACAUDAL; PERINEURAL at 11:16

## 2021-12-20 RX ADMIN — MIDAZOLAM 1 MG: 1 INJECTION INTRAMUSCULAR; INTRAVENOUS at 11:13

## 2021-12-20 RX ADMIN — FENTANYL CITRATE 50 MCG: 50 INJECTION INTRAMUSCULAR; INTRAVENOUS at 11:22

## 2021-12-20 RX ADMIN — Medication 5 ML: at 12:18

## 2021-12-20 RX ADMIN — SODIUM CHLORIDE: 9 INJECTION, SOLUTION INTRAVENOUS at 10:23

## 2021-12-20 RX ADMIN — FENTANYL CITRATE 50 MCG: 50 INJECTION INTRAMUSCULAR; INTRAVENOUS at 11:14

## 2021-12-20 RX ADMIN — MIDAZOLAM 1 MG: 1 INJECTION INTRAMUSCULAR; INTRAVENOUS at 11:21

## 2021-12-20 ASSESSMENT — MIFFLIN-ST. JEOR: SCORE: 1876.33

## 2021-12-20 NOTE — PROGRESS NOTES
Prep complete for Right lymph node biopsy. VSS. Consent signed. Wife, Nancy will  post procedure.

## 2021-12-20 NOTE — DISCHARGE INSTRUCTIONS
[facility]    Interventional Radiology  Patient Instructions Following Biopsy    AFTER YOU GO HOME  ? If you were given sedation DO NOT drive or operate machinery at home or at work for at least 24 hours  ? DO relax and take it easy for 48 hours, no strenuous activity for 24 hours  ? DO drink plenty of fluids  ? DO resume your regular diet, unless otherwise instructed by your Primary Physician  ? Keep the dressing dry and in place for 24 hours.  ? DO NOT SMOKE FOR AT LEAST 24 HOURS, if you have been given any medications that were to help you relax or sedate you during your procedure  ? DO NOT drink alcoholic beverages the day of your procedure  ? DO NOT do any strenuous exercise or lifting (> 10 lbs) for at least 7 days following your procedure  ? DO NOT take a bath or shower for at least 12 hours following your procedure  ? Remove dressing after shower the next day. Replace with Band aid for 2 days.  Never leave a wet dressing in place.  ? DO NOT make any important or legal decisions for 24 hours following your procedure  ? There should be minimum drainage from the biopsy site    CALL THE PHYSICIAN IF:  ? You start bleeding from the procedure site.  If you do start to bleed from that site, lie down flat and hold pressure on the site for a minimum of 10 minutes.  Your physician will tell you if you need to return to the hospital  ? You develop nausea or vomiting  ? You have excessive swelling, redness, or tenderness at the site  ? You have drainage that looks like it is infected.  ? You experience severe pain  ? You develop hives or a rash or unexplained itching  ? You develop shortness of breath  ? You develop a temperature of 101 degrees F or greater        [facility] INTERVENTIONAL RADIOLOGY DEPARTMENT  Procedure Physician: THOMAS Ross                                     Date of procedure: December 20, 2021    Telephone Numbers: [phone] Monday-Friday 8:00 am to 4:30  pm  [phone] After 4:30 pm Monday-Friday, Weekends & Holidays.   Ask for the Interventional Radiologist on call.  Someone is on call 24 hrs/day  [facility] toll free number: [phone] Monday-Friday 8:00 am to 4:30 pm  [facility] Emergency Dept: [phone]

## 2021-12-20 NOTE — PROGRESS NOTES
Juvenal paged the BMT triage pager to report ~ 3 week history of productive cough and runny nose. No fever. No sinus pain or nasal congestion. No shortness of breath. No body aches or weakness. Otherwise feels ok. He has been tested x 2 for covid, last on 12/17 and both were negative.    Advised he is ok to proceed with chemo this week, but we will obtain a respiratory viral panel while he is here tomorrow - this has been ordered.    Irma nSeed PA-C  [phone]

## 2021-12-20 NOTE — PROGRESS NOTES
Patient Name: Juvenal Blake  Medical Record Number: [id number]  Today's Date: 12/20/2021    Procedure:right inguinal lymph node biopsy  Proceduralist: Robby Lambert PA-C  Pathology present: Yes, pathology staff present.    Patient in room:1100  Procedure Start: 1120  Procedure end: 1143  Sedation medications administered: 2 mg Versed, 100 mcg Fentanyl.  Patient out of room: 1148    Report given to: RICHARD Hutchison    Other Notes: Pt arrived to IR room 6 from 2.A.  Consent reviewed. Pt denies any questions or concerns regarding procedure. Pt positioned supine and monitored per protocol. Pt tolerated procedure without any noted complications. Pt transferred back to 2.A.    Core specimens sent to lab:    Lab surgical pathology: 4 cores  Flow cytometry: 1 core    Research formalin: 3 cores.  Research rna vial #1: 1 core.  Research rna vial #2: 1 core.     10 total cores taken.

## 2021-12-20 NOTE — PRE-PROCEDURE
GENERAL PRE-PROCEDURE:   Procedure:  Right inguinal lymph node biopsy  Date/Time:  12/20/2021 10:15 AM    Verbal consent obtained?: Yes    Written consent obtained?: Yes    Risks and benefits: Risks, benefits and alternatives were discussed    DC Plan: Appropriate discharge home plan in place for patients who are going home after procedure   Consent given by:  Patient  Patient states understanding of procedure being performed: Yes    Patient's understanding of procedure matches consent: Yes    Procedure consent matches procedure scheduled: Yes    Expected level of sedation:  Moderate  Appropriately NPO:  Yes  ASA Class:  3  Mallampati  :  Grade 2- soft palate, base of uvula, tonsillar pillars, and portion of posterior pharyngeal wall visible  Lungs:  Lungs clear with good breath sounds bilaterally  Heart:  Normal heart sounds and rate  History & Physical reviewed:  History and physical reviewed and no updates needed  Statement of review:  I have reviewed the lab findings, diagnostic data, medications, and the plan for sedation

## 2021-12-20 NOTE — PROGRESS NOTES
Condition is stable s/p Right lymph node biopsy. Vital Signs are stable/WNL. Right inguinal site clean, dry and intact, cms intact. Discharge instructions reviewed with patient and questions answered. Patient verbalizes understanding. Port is de-accessed and heparin locked. Pain under control. Patient is ambulating and voiding spontaneously. Patient is tolerating regular diet and denies any N/V. Patient to be discharged to home via wife. Patient has all belongings

## 2021-12-20 NOTE — TELEPHONE ENCOUNTER
Pt called experiencing symptoms, states has cold like symptoms and would like to know what he can take/if he's still able to come in for his appointments. I informed pt that I will page an NAYA to give Juvenal a call @ [phone] and if pt does not get a call back within an hour to call [phone] again. Pt agreed. NAYA paged.

## 2021-12-20 NOTE — PROCEDURES
[facility]    Procedure: IR Procedure Note    Date/Time: 12/20/2021 11:46 AM  Performed by: Ezio Lambert PA-C  Authorized by: Ezio Lambert PA-C       UNIVERSAL PROTOCOL   Site Marked: NA  Prior Images Obtained and Reviewed:  Yes  Required items: Required blood products, implants, devices and special equipment available    Patient identity confirmed:  Verbally with patient, arm band, provided demographic data and hospital-assigned identification number  Patient was reevaluated immediately before administering moderate or deep sedation or anesthesia  Confirmation Checklist:  Patient's identity using two indicators, relevant allergies, procedure was appropriate and matched the consent or emergent situation and correct equipment/implants were available  Time out: Immediately prior to the procedure a time out was called    Universal Protocol: the Joint Commission Universal Protocol was followed    Preparation: Patient was prepped and draped in usual sterile fashion       ANESTHESIA    Anesthesia: Local infiltration  Local Anesthetic:  Lidocaine 1% without epinephrine      SEDATION  Patient Sedated: Yes    Vital signs: Vital signs monitored during sedation    See dictated procedure note for full details.  Findings: Sedation medications administered: 2 mg Versed, 100 mcg Fentanyl  Sedation time: 25 minutes    Specimens: core needle biopsy specimens sent for pathological analysis    Complications: None    Condition: Stable      PROCEDURE  Describe Procedure: Juvenal MCGRAW Gabrielkenneyrachele  [id number]    Completed ultrasound guided RIGHT groin mass biopsy.  A total of ten three passes yielded kidney tissue cores collected for further pathological and research evaluation.  No immediate complications.  Dx: groin mass.  Petra.    Sedation medications administered: 2 mg Versed, 100 mcg Fentanyl  Sedation time: 25 minutes  Patient Tolerance:  Patient tolerated the  procedure well with no immediate complications  Length of time physician/provider present for 1:1 monitoring during sedation: 25

## 2021-12-20 NOTE — PROGRESS NOTES
Pt arrived via cart with RN from IR s/p Right lymph nod biopsy. VSS. Right inguinal site CDI, no hematoma. Patient denies pain. Bedrest until 1230 per MD orders

## 2021-12-21 ENCOUNTER — APPOINTMENT (OUTPATIENT)
Dept: LAB | Facility: CLINIC | Age: 56
End: 2021-12-21
Payer: COMMERCIAL

## 2021-12-21 ENCOUNTER — ONCOLOGY VISIT (OUTPATIENT)
Dept: TRANSPLANT | Facility: CLINIC | Age: 56
End: 2021-12-21
Payer: COMMERCIAL

## 2021-12-21 ENCOUNTER — INFUSION THERAPY VISIT (OUTPATIENT)
Dept: TRANSPLANT | Facility: CLINIC | Age: 56
End: 2021-12-21
Payer: COMMERCIAL

## 2021-12-21 ENCOUNTER — ANCILLARY PROCEDURE (OUTPATIENT)
Dept: GENERAL RADIOLOGY | Facility: CLINIC | Age: 56
End: 2021-12-21
Attending: STUDENT IN AN ORGANIZED HEALTH CARE EDUCATION/TRAINING PROGRAM
Payer: COMMERCIAL

## 2021-12-21 VITALS
WEIGHT: 237.8 LBS | DIASTOLIC BLOOD PRESSURE: 83 MMHG | BODY MASS INDEX: 35.12 KG/M2 | TEMPERATURE: 98.7 F | OXYGEN SATURATION: 96 % | HEART RATE: 69 BPM | RESPIRATION RATE: 14 BRPM | SYSTOLIC BLOOD PRESSURE: 130 MMHG

## 2021-12-21 DIAGNOSIS — C82.08 FOLLICULAR LYMPHOMA GRADE I, LYMPH NODES OF MULTIPLE SITES (H): ICD-10-CM

## 2021-12-21 DIAGNOSIS — C82.08 FOLLICULAR LYMPHOMA GRADE I, LYMPH NODES OF MULTIPLE SITES (H): Primary | ICD-10-CM

## 2021-12-21 LAB
ALBUMIN SERPL-MCNC: 3.8 G/DL (ref 3.4–5)
ALP SERPL-CCNC: 100 U/L (ref 40–150)
ALT SERPL W P-5'-P-CCNC: 48 U/L (ref 0–70)
ANION GAP SERPL CALCULATED.3IONS-SCNC: 7 MMOL/L (ref 3–14)
AST SERPL W P-5'-P-CCNC: 25 U/L (ref 0–45)
BASOPHILS # BLD AUTO: 0 10E3/UL (ref 0–0.2)
BASOPHILS NFR BLD AUTO: 0 %
BILIRUB SERPL-MCNC: 0.5 MG/DL (ref 0.2–1.3)
BUN SERPL-MCNC: 15 MG/DL (ref 7–30)
CALCIUM SERPL-MCNC: 9.1 MG/DL (ref 8.5–10.1)
CHLORIDE BLD-SCNC: 109 MMOL/L (ref 94–109)
CO2 SERPL-SCNC: 25 MMOL/L (ref 20–32)
CREAT SERPL-MCNC: 1.28 MG/DL (ref 0.66–1.25)
EOSINOPHIL # BLD AUTO: 0.1 10E3/UL (ref 0–0.7)
EOSINOPHIL NFR BLD AUTO: 2 %
ERYTHROCYTE [DISTWIDTH] IN BLOOD BY AUTOMATED COUNT: 13.6 % (ref 10–15)
GFR SERPL CREATININE-BSD FRML MDRD: 61 ML/MIN/1.73M2
GLUCOSE BLD-MCNC: 110 MG/DL (ref 70–99)
HBV CORE AB SERPL QL IA: NONREACTIVE
HBV SURFACE AG SERPL QL IA: NONREACTIVE
HCT VFR BLD AUTO: 32.9 % (ref 40–53)
HGB BLD-MCNC: 11.3 G/DL (ref 13.3–17.7)
IMM GRANULOCYTES # BLD: 0.1 10E3/UL
IMM GRANULOCYTES NFR BLD: 1 %
LDH SERPL L TO P-CCNC: 189 U/L (ref 85–227)
LYMPHOCYTES # BLD AUTO: 0.4 10E3/UL (ref 0.8–5.3)
LYMPHOCYTES NFR BLD AUTO: 7 %
MCH RBC QN AUTO: 33.3 PG (ref 26.5–33)
MCHC RBC AUTO-ENTMCNC: 34.3 G/DL (ref 31.5–36.5)
MCV RBC AUTO: 97 FL (ref 78–100)
MONOCYTES # BLD AUTO: 0.5 10E3/UL (ref 0–1.3)
MONOCYTES NFR BLD AUTO: 9 %
NEUTROPHILS # BLD AUTO: 4.2 10E3/UL (ref 1.6–8.3)
NEUTROPHILS NFR BLD AUTO: 81 %
NRBC # BLD AUTO: 0 10E3/UL
NRBC BLD AUTO-RTO: 0 /100
PATH REPORT.COMMENTS IMP SPEC: NORMAL
PATH REPORT.FINAL DX SPEC: NORMAL
PATH REPORT.MICROSCOPIC SPEC OTHER STN: NORMAL
PATH REPORT.RELEVANT HX SPEC: NORMAL
PLATELET # BLD AUTO: 107 10E3/UL (ref 150–450)
POTASSIUM BLD-SCNC: 4.2 MMOL/L (ref 3.4–5.3)
PROT SERPL-MCNC: 6.5 G/DL (ref 6.8–8.8)
RBC # BLD AUTO: 3.39 10E6/UL (ref 4.4–5.9)
SODIUM SERPL-SCNC: 141 MMOL/L (ref 133–144)
WBC # BLD AUTO: 5.2 10E3/UL (ref 4–11)

## 2021-12-21 PROCEDURE — 87340 HEPATITIS B SURFACE AG IA: CPT

## 2021-12-21 PROCEDURE — 258N000003 HC RX IP 258 OP 636: Performed by: STUDENT IN AN ORGANIZED HEALTH CARE EDUCATION/TRAINING PROGRAM

## 2021-12-21 PROCEDURE — 250N000013 HC RX MED GY IP 250 OP 250 PS 637

## 2021-12-21 PROCEDURE — 83615 LACTATE (LD) (LDH) ENZYME: CPT

## 2021-12-21 PROCEDURE — 86704 HEP B CORE ANTIBODY TOTAL: CPT

## 2021-12-21 PROCEDURE — 85025 COMPLETE CBC W/AUTO DIFF WBC: CPT

## 2021-12-21 PROCEDURE — 80053 COMPREHEN METABOLIC PANEL: CPT

## 2021-12-21 PROCEDURE — 71046 X-RAY EXAM CHEST 2 VIEWS: CPT | Performed by: RADIOLOGY

## 2021-12-21 PROCEDURE — 36591 DRAW BLOOD OFF VENOUS DEVICE: CPT

## 2021-12-21 PROCEDURE — 96413 CHEMO IV INFUSION 1 HR: CPT

## 2021-12-21 PROCEDURE — 258N000003 HC RX IP 258 OP 636

## 2021-12-21 PROCEDURE — 96417 CHEMO IV INFUS EACH ADDL SEQ: CPT

## 2021-12-21 PROCEDURE — 96367 TX/PROPH/DG ADDL SEQ IV INF: CPT

## 2021-12-21 PROCEDURE — 250N000011 HC RX IP 250 OP 636

## 2021-12-21 PROCEDURE — 250N000011 HC RX IP 250 OP 636: Performed by: STUDENT IN AN ORGANIZED HEALTH CARE EDUCATION/TRAINING PROGRAM

## 2021-12-21 PROCEDURE — 99214 OFFICE O/P EST MOD 30 MIN: CPT

## 2021-12-21 PROCEDURE — G0463 HOSPITAL OUTPT CLINIC VISIT: HCPCS | Mod: 25

## 2021-12-21 RX ORDER — DIPHENHYDRAMINE HCL 25 MG
50 CAPSULE ORAL ONCE
Status: COMPLETED | OUTPATIENT
Start: 2021-12-21 | End: 2021-12-21

## 2021-12-21 RX ORDER — METHYLPREDNISOLONE SODIUM SUCCINATE 125 MG/2ML
125 INJECTION, POWDER, LYOPHILIZED, FOR SOLUTION INTRAMUSCULAR; INTRAVENOUS ONCE
Status: DISCONTINUED | OUTPATIENT
Start: 2021-12-21 | End: 2021-12-21

## 2021-12-21 RX ORDER — ACETAMINOPHEN 325 MG/1
650 TABLET ORAL ONCE
Status: COMPLETED | OUTPATIENT
Start: 2021-12-21 | End: 2021-12-21

## 2021-12-21 RX ORDER — HEPARIN SODIUM (PORCINE) LOCK FLUSH IV SOLN 100 UNIT/ML 100 UNIT/ML
5 SOLUTION INTRAVENOUS ONCE
Status: COMPLETED | OUTPATIENT
Start: 2021-12-21 | End: 2021-12-21

## 2021-12-21 RX ORDER — LEVOFLOXACIN 500 MG/1
500 TABLET, FILM COATED ORAL DAILY
Qty: 7 TABLET | Refills: 0 | COMMUNITY
Start: 2021-12-21 | End: 2021-12-29

## 2021-12-21 RX ADMIN — ACETAMINOPHEN 650 MG: 325 TABLET ORAL at 08:38

## 2021-12-21 RX ADMIN — DEXAMETHASONE SODIUM PHOSPHATE: 10 INJECTION, SOLUTION INTRAMUSCULAR; INTRAVENOUS at 08:38

## 2021-12-21 RX ADMIN — DIPHENHYDRAMINE HYDROCHLORIDE 50 MG: 25 CAPSULE ORAL at 08:38

## 2021-12-21 RX ADMIN — POLATUZUMAB VEDOTIN 200 MG: 140 INJECTION, POWDER, LYOPHILIZED, FOR SOLUTION INTRAVENOUS at 09:12

## 2021-12-21 RX ADMIN — Medication 5 ML: at 07:34

## 2021-12-21 ASSESSMENT — PAIN SCALES - GENERAL: PAINLEVEL: MILD PAIN (2)

## 2021-12-21 NOTE — NURSING NOTE
"Oncology Rooming Note    December 21, 2021 7:47 AM   Juvenal Blake is a 56 year old male who presents for:    Chief Complaint   Patient presents with     Port Draw     Labs drawn via port by rn in lab. VS taken.     Initial Vitals: /86 (BP Location: Right arm, Patient Position: Sitting, Cuff Size: Adult Large)   Pulse 73   Temp 98.5  F (36.9  C) (Oral)   Resp 16   Wt 107.9 kg (237 lb 12.8 oz)   SpO2 97%   BMI 35.12 kg/m   Estimated body mass index is 35.12 kg/m  as calculated from the following:    Height as of 12/20/21: 1.753 m (5' 9\").    Weight as of this encounter: 107.9 kg (237 lb 12.8 oz). Body surface area is 2.29 meters squared.  Mild Pain (2) Comment: Data Unavailable   No LMP for male patient.  Allergies reviewed: Yes  Medications reviewed: Yes    Medications: Medication refills not needed today.  Pharmacy name entered into Med ePad: [id number] - [place], [place] - [address] AT [facility]    Clinical concerns: reports 3 week cough and congestion. Taking mucinex, cold and flu pills. Reports SOB, feeling nauseated. Reports that occurs from time to time but worse today.     Gloria Holbrook RN              "

## 2021-12-21 NOTE — LETTER
Date:January 4, 2022      Provider requested that no letter be sent. Do not send.       [facility]

## 2021-12-21 NOTE — PROGRESS NOTES
BMT Clinic Note     Juvenal Blake is a 55 year old male PMH significant for refractory NHL, s/p Yescarta CAR-T cellular therapy. Now enrolled on , receiving tod (without rituxan)      HPI: Juvenal is seen in infusion today with chemotherapy bridging him to cell therapy. He notes a persistent cough for the last two months without improvement. He has been virally swabbed. He notes mild SOB with exertion, no light headedness or dizziness. No sinus pain or pressure. He denies fevers, takes tylenol occasionally. No rashes, bruises or bleeding. Notes right groin, at site of LN biopsy, is still palpable, however a little smaller to him. He denies any new lumps or bumps.    ROS: 8 point ROS negative except as above.      Physical Exam:   Vitals stable  GA: NAD. Appears as stated age.  HEENT: PERRL, OP Clear  Neck: Supple, no JVD. Neck fullness without lymphadenopathy   CV: RRR, no mrg  Lungs: no wheezes, generalized rhonchi, clear with cough  Abd: Soft, nt, nd  Lymph: right groin without any palpable nodes today   Ext: 1+ edema. Warm  Neuro: AAO, moving all ext. Symmetric face  Skin: warm, no rashes  Psyc: Appropriate and cooperative  Access: peripheral access                                                                                                                                                                ECO    Labs:  Lab Results   Component Value Date    WBC 5.2 2021    ANEU 0.6 (L) 2021    HGB 11.3 (L) 2021    HCT 32.9 (L) 2021     (L) 2021     2021    POTASSIUM 4.2 2021    CHLORIDE 109 2021    CO2 25 2021     (H) 2021    BUN 15 2021    CR 1.28 (H) 2021    MAG 2.0 2021    INR 1.05 2021    BILITOTAL 0.5 2021    AST 25 2021    ALT 48 2021    ALKPHOS 100 2021    PROTTOTAL 6.5 (L) 2021    ALBUMIN 3.8 2021       Assessment and Plan:   Juvenal Blake is a 55 year  old male PMH significant for refractory NHL, undergoing  NK infusion. Admitted with rituxan (biosimilar) infusion reaction 6/24 and remained admitted through completion of lymphodepleting chemo and  NK cell infusion.      1. Follicular Lymphoma:   - relapsed after BR, OCHOP, NAM NK and now . He has had idelalisib as a bridge and responded.  - Collection on 12/13. We discussed the lymphodepleting chemotherapy followed by CAR-T cell infusion.  - We discussed the potential benefits and risks. In the Zuma-5 study there was a response rate of >90%, as well as a potential of CRS and neurotoxicity in about 10-20% of patients. We also discussed side effects including nausea, vomiting, fevers, CRS, neurotoxicity.  - He signed consents for the Yescarta trial as well as the biopsy of the groin lymph node.  - Closed 12/7 (progression after Yescarta). Bridging: tod only (no rituxan due to reaction last cycle), tumor biopsy done 12/20. Cells should be ready ~Jan 4.    2. HEME: Keep hgb >7g/dL, plt>10,000.   Mild anemia and thrombocytopenia secondary to chemotherapy/lymphoma. No transfusions needed.     3 ID: cough/cold symptoms for last several weeks CXR negative. Rhino virus may still be lingering. Still, pt prefers to use levaquin at 500mg for a week as this has been helpful for him in the past. While not clearly clinically indicated at this time, CXR okay and no fever, will allow this to try to clear symptoms prior to cell therapy   - Covid neg 12/7. No need for nasal swab as it would not change my management.   acyclovir 800mg bid through 1 year as shingles prophylaxis, Bactrim (for 6 months post therapy, then check T cell subsets to determine if he has ongoing need).  - hx C diff colitis   - s/p 3 covid shots; first two were pre- and booster was after.  Defer to car -t team regarding if revaccination needed post CART.   - Rhinovirus+10/6. CXR with mild bronchial thickening but no PNA. IgG low at 240, got  IVIG last month. No real indication for continued IVIG replacement.    4. CV: No complaints of chest pain.   Cardiology cleared him to proceed with no further testing. The CT angiogram showed no lesion requiring stenting or bypass.    5. :   Post radical prostatectomy and bilateral lymph node dissection. November 15, 2017. Prostatic adenocarcinoma Celina 4+3 = 7 with tertiary pattern 5. Tumor involves 45% of total surface area. Positive for extensive extraprostatic extension. Positive for bilateral seminal vesicle invasion. Multiple margins positive. Lymphovascular invasion not identified. Perineural invasion was noted. Lymph nodes negative. 3 were examined (2 right obturator and 1 left obturator). Completed radiation to the prostate fossa and pelvic lymph nodes [facility] early May 2018. He received 4500 cGy in 25 fractions. He then had 2340 cGy boost in 13 fractions to the prostatic fossa, for a total dose of 6240 cGy in 38 treatment fractions delivered over 54 days. He did not receive hormonal therapy:    9/10 PSA: 0.71 - no concerns.      6.FEN/Renal:   - Cr mildly elevated.    - Lytes stable.      7. GI:    #intermittent nausea -  medical cannibis.  #Abdominal Pain secondary to cancer. He is in remission, but still has this pain.      8. Dental: s/p root canal; well healed and mouth feels good.     Plan: given polatuximab monotherapy as bridging per Dr Terry  Start levaquin for persistent cough  Next week will start work up per nursing coordinator  RTC to check counts Thursday this week and follow up on cough     30 minutes was spent on this appointment which included patient exam, chart review, orders required and review, consult with ordering chemotherapy and change in treatment plan, as well as future therapy anticipated.    Narcisa Stewart PAC  [phone]

## 2021-12-21 NOTE — NURSING NOTE
"Oncology Rooming Note    December 21, 2021 8:22 AM   Juvenal Blake is a 56 year old male who presents for:    Chief Complaint   Patient presents with     Oncology Clinic Visit     Add-on clinic visit dx lymphoma     Initial Vitals: There were no vitals taken for this visit. Estimated body mass index is 35.12 kg/m  as calculated from the following:    Height as of 12/20/21: 1.753 m (5' 9\").    Weight as of an earlier encounter on 12/21/21: 107.9 kg (237 lb 12.8 oz). There is no height or weight on file to calculate BSA.  Data Unavailable Comment: Data Unavailable   No LMP for male patient.  Allergies reviewed: Yes  Medications reviewed: Yes    Medications: Medication refills not needed today.  Pharmacy name entered into PrePlay: [id number] - [place], [place] - [address] AT [facility]    Clinical concerns: Patient reports productive cough, congestion, and ongoing nausea.  Gloria Holbrook RN              "

## 2021-12-21 NOTE — LETTER
12/21/2021         RE: Juvenal Blake  [address] [place] [address]        Dear Colleague,    Thank you for referring your patient, Juvenal Blake, to the [facility] BLOOD AND MARROW TRANSPLANT PROGRAM [place]. Please see a copy of my visit note below.    Infusion Nursing Note:  Juvenal Blake presents today for scheduled infusion.    Patient seen by provider today: Yes: Narcisa Stewart PA-C   present during visit today: Not Applicable.    Note: Patient arrived for D1C1 polatuzumab infusion. Patient reports 3+ weeks of congestion and cough, SOB, and nausea. Provider notified, OK to proceed with chemotherapy infusion. Premedicated with Tylenol 650 mg PO, Benadryl 50 mg PO, and Decadron 12 mg IV infusion. Received polatuzumab 200 mg IV as scheduled. Patient monitored for 90 minutes following infusion.       Intravenous Access:  Implanted Port. Positive blood return pre/post infusion.    Treatment Conditions:  Lab Results   Component Value Date    HGB 11.3 (L) 12/21/2021    WBC 5.2 12/21/2021    ANEU 0.6 (L) 09/08/2021    ANEUTAUTO 4.2 12/21/2021     (L) 12/21/2021      Lab Results   Component Value Date     12/21/2021    POTASSIUM 4.2 12/21/2021    MAG 2.0 11/19/2021    CR 1.28 (H) 12/21/2021    TRE 9.1 12/21/2021    BILITOTAL 0.5 12/21/2021    ALBUMIN 3.8 12/21/2021    ALT 48 12/21/2021    AST 25 12/21/2021         Post Infusion Assessment:  Patient tolerated infusion without incident.       Discharge Plan:   Patient discharged in stable condition accompanied by: self.  Departure Mode: Ambulatory.      Gloria Holbrook RN                          Again, thank you for allowing me to participate in the care of your patient.        Sincerely,        [facility]

## 2021-12-21 NOTE — LETTER
2021         RE: Juvenal Blake  [address] [place] [address]        Dear Colleague,    Thank you for referring your patient, Juvenal Blake, to the [facility] BLOOD AND MARROW TRANSPLANT PROGRAM [place]. Please see a copy of my visit note below.    BMT Clinic Note     Juvenal Blake is a 55 year old male PMH significant for refractory NHL, s/p Yescarta CAR-T cellular therapy. Now enrolled on , receiving tod (without rituxan)      HPI: Juvenal is seen in infusion today with chemotherapy bridging him to cell therapy. He notes a persistent cough for the last two months without improvement. He has been virally swabbed. He notes mild SOB with exertion, no light headedness or dizziness. No sinus pain or pressure. He denies fevers, takes tylenol occasionally. No rashes, bruises or bleeding. Notes right groin, at site of LN biopsy, is still palpable, however a little smaller to him. He denies any new lumps or bumps.    ROS: 8 point ROS negative except as above.      Physical Exam:   Vitals stable  GA: NAD. Appears as stated age.  HEENT: PERRL, OP Clear  Neck: Supple, no JVD. Neck fullness without lymphadenopathy   CV: RRR, no mrg  Lungs: no wheezes, generalized rhonchi, clear with cough  Abd: Soft, nt, nd  Lymph: right groin without any palpable nodes today   Ext: 1+ edema. Warm  Neuro: AAO, moving all ext. Symmetric face  Skin: warm, no rashes  Psyc: Appropriate and cooperative  Access: peripheral access                                                                                                                                                                ECO    Labs:  Lab Results   Component Value Date    WBC 5.2 2021    ANEU 0.6 (L) 2021    HGB 11.3 (L) 2021    HCT 32.9 (L) 2021     (L) 2021     2021    POTASSIUM 4.2 2021    CHLORIDE 109 2021    CO2 25 2021     (H) 2021    BUN 15 2021     CR 1.28 (H) 12/21/2021    MAG 2.0 11/19/2021    INR 1.05 12/13/2021    BILITOTAL 0.5 12/21/2021    AST 25 12/21/2021    ALT 48 12/21/2021    ALKPHOS 100 12/21/2021    PROTTOTAL 6.5 (L) 12/21/2021    ALBUMIN 3.8 12/21/2021       Assessment and Plan:   Juvenal Blake is a 55 year old male PMH significant for refractory NHL, undergoing  NK infusion. Admitted with rituxan (biosimilar) infusion reaction 6/24 and remained admitted through completion of lymphodepleting chemo and  NK cell infusion.      1. Follicular Lymphoma:   - relapsed after BR, [facility], NAM NK and now . He has had idelalisib as a bridge and responded.  - Collection on 12/13. We discussed the lymphodepleting chemotherapy followed by CAR-T cell infusion.  - We discussed the potential benefits and risks. In the Zuma-5 study there was a response rate of >90%, as well as a potential of CRS and neurotoxicity in about 10-20% of patients. We also discussed side effects including nausea, vomiting, fevers, CRS, neurotoxicity.  - He signed consents for the Yescarta trial as well as the biopsy of the groin lymph node.  - Closed 12/7 (progression after Yescarta). Bridging: tod only (no rituxan due to reaction last cycle), tumor biopsy done 12/20. Cells should be ready ~Jan 4.    2. HEME: Keep hgb >7g/dL, plt>10,000.   Mild anemia and thrombocytopenia secondary to chemotherapy/lymphoma. No transfusions needed.     3 ID: cough/cold symptoms for last several weeks CXR negative. Rhino virus may still be lingering. Still, pt prefers to use levaquin at 500mg for a week as this has been helpful for him in the past. While not clearly clinically indicated at this time, CXR okay and no fever, will allow this to try to clear symptoms prior to cell therapy   - Covid neg 12/7. No need for nasal swab as it would not change my management.   acyclovir 800mg bid through 1 year as shingles prophylaxis, Bactrim (for 6 months post therapy, then check T cell subsets  to determine if he has ongoing need).  - hx C diff colitis   - s/p 3 covid shots; first two were pre- and booster was after.  Defer to car -t team regarding if revaccination needed post CART.   - Rhinovirus+10/6. CXR with mild bronchial thickening but no PNA. IgG low at 240, got IVIG last month. No real indication for continued IVIG replacement.    4. CV: No complaints of chest pain.   Cardiology cleared him to proceed with no further testing. The CT angiogram showed no lesion requiring stenting or bypass.    5. :   Post radical prostatectomy and bilateral lymph node dissection. November 15, 2017. Prostatic adenocarcinoma [place] 4+3 = 7 with tertiary pattern 5. Tumor involves 45% of total surface area. Positive for extensive extraprostatic extension. Positive for bilateral seminal vesicle invasion. Multiple margins positive. Lymphovascular invasion not identified. Perineural invasion was noted. Lymph nodes negative. 3 were examined (2 right obturator and 1 left obturator). Completed radiation to the prostate fossa and pelvic lymph nodes at [facility] early May 2018. He received 4500 cGy in 25 fractions. He then had 2340 cGy boost in 13 fractions to the prostatic fossa, for a total dose of 6240 cGy in 38 treatment fractions delivered over 54 days. He did not receive hormonal therapy:    9/10 PSA: 0.71 - no concerns.      6.FEN/Renal:   - Cr mildly elevated.    - Lytes stable.      7. GI:    #intermittent nausea -  medical cannibis.  #Abdominal Pain secondary to cancer. He is in remission, but still has this pain.      8. Dental: s/p root canal; well healed and mouth feels good.     Plan: given polatuximab monotherapy as bridging per Dr Terry  Start levaquin for persistent cough  Next week will start work up per nursing coordinator  RTC to check counts Thursday this week and follow up on cough     30 minutes was spent on this appointment which included patient exam, chart review, orders required  and review, consult with ordering chemotherapy and change in treatment plan, as well as future therapy anticipated.    Narcisa Stewart PAC  [phone]            Again, thank you for allowing me to participate in the care of your patient.        Sincerely,        Health system Advanced Practice Provider

## 2021-12-21 NOTE — PROGRESS NOTES
Infusion Nursing Note:  Juvenal Blake presents today for scheduled infusion.    Patient seen by provider today: Yes: Narcisa Stewart PA-C   present during visit today: Not Applicable.    Note: Patient arrived for D1C1 polatuzumab infusion. Patient reports 3+ weeks of congestion and cough, SOB, and nausea. Provider notified, OK to proceed with chemotherapy infusion. Premedicated with Tylenol 650 mg PO, Benadryl 50 mg PO, and Decadron 12 mg IV infusion. Received polatuzumab 200 mg IV as scheduled. Patient monitored for 90 minutes following infusion.       Intravenous Access:  Implanted Port. Positive blood return pre/post infusion.    Treatment Conditions:  Lab Results   Component Value Date    HGB 11.3 (L) 12/21/2021    WBC 5.2 12/21/2021    ANEU 0.6 (L) 09/08/2021    ANEUTAUTO 4.2 12/21/2021     (L) 12/21/2021      Lab Results   Component Value Date     12/21/2021    POTASSIUM 4.2 12/21/2021    MAG 2.0 11/19/2021    CR 1.28 (H) 12/21/2021    TRE 9.1 12/21/2021    BILITOTAL 0.5 12/21/2021    ALBUMIN 3.8 12/21/2021    ALT 48 12/21/2021    AST 25 12/21/2021         Post Infusion Assessment:  Patient tolerated infusion without incident.       Discharge Plan:   Patient discharged in stable condition accompanied by: self.  Departure Mode: Ambulatory.      Gloria Holbrook RN

## 2021-12-22 DIAGNOSIS — C85.90 NH LYMPHOMA (H): ICD-10-CM

## 2021-12-22 DIAGNOSIS — Z86.2 PERSONAL HISTORY OF DISEASES OF BLOOD AND BLOOD-FORMING ORGANS: ICD-10-CM

## 2021-12-22 DIAGNOSIS — C82.90 FOLLICULAR LYMPHOMA (H): Primary | ICD-10-CM

## 2021-12-23 ENCOUNTER — APPOINTMENT (OUTPATIENT)
Dept: LAB | Facility: CLINIC | Age: 56
End: 2021-12-23
Payer: COMMERCIAL

## 2021-12-23 ENCOUNTER — ONCOLOGY VISIT (OUTPATIENT)
Dept: TRANSPLANT | Facility: CLINIC | Age: 56
End: 2021-12-23
Payer: COMMERCIAL

## 2021-12-23 VITALS
WEIGHT: 233.8 LBS | TEMPERATURE: 98.9 F | SYSTOLIC BLOOD PRESSURE: 131 MMHG | BODY MASS INDEX: 34.53 KG/M2 | RESPIRATION RATE: 16 BRPM | DIASTOLIC BLOOD PRESSURE: 78 MMHG | HEART RATE: 82 BPM | OXYGEN SATURATION: 97 %

## 2021-12-23 DIAGNOSIS — C82.08 FOLLICULAR LYMPHOMA GRADE I, LYMPH NODES OF MULTIPLE SITES (H): ICD-10-CM

## 2021-12-23 DIAGNOSIS — R05.9 COUGH: Primary | ICD-10-CM

## 2021-12-23 LAB
ANION GAP SERPL CALCULATED.3IONS-SCNC: 6 MMOL/L (ref 3–14)
BASOPHILS # BLD AUTO: 0 10E3/UL (ref 0–0.2)
BASOPHILS NFR BLD AUTO: 0 %
BUN SERPL-MCNC: 17 MG/DL (ref 7–30)
C PNEUM DNA SPEC QL NAA+PROBE: NOT DETECTED
CALCIUM SERPL-MCNC: 8.7 MG/DL (ref 8.5–10.1)
CHLORIDE BLD-SCNC: 108 MMOL/L (ref 94–109)
CO2 SERPL-SCNC: 25 MMOL/L (ref 20–32)
CREAT SERPL-MCNC: 1.27 MG/DL (ref 0.66–1.25)
EOSINOPHIL # BLD AUTO: 0.2 10E3/UL (ref 0–0.7)
EOSINOPHIL NFR BLD AUTO: 3 %
ERYTHROCYTE [DISTWIDTH] IN BLOOD BY AUTOMATED COUNT: 13.9 % (ref 10–15)
FLUAV H1 2009 PAND RNA SPEC QL NAA+PROBE: NOT DETECTED
FLUAV H1 RNA SPEC QL NAA+PROBE: NOT DETECTED
FLUAV H3 RNA SPEC QL NAA+PROBE: NOT DETECTED
FLUAV RNA SPEC QL NAA+PROBE: NOT DETECTED
FLUBV RNA SPEC QL NAA+PROBE: NOT DETECTED
GFR SERPL CREATININE-BSD FRML MDRD: 65 ML/MIN/1.73M2
GLUCOSE BLD-MCNC: 124 MG/DL (ref 70–99)
HADV DNA SPEC QL NAA+PROBE: NOT DETECTED
HCOV PNL SPEC NAA+PROBE: NOT DETECTED
HCT VFR BLD AUTO: 33.9 % (ref 40–53)
HGB BLD-MCNC: 11.3 G/DL (ref 13.3–17.7)
HMPV RNA SPEC QL NAA+PROBE: NOT DETECTED
HPIV1 RNA SPEC QL NAA+PROBE: NOT DETECTED
HPIV2 RNA SPEC QL NAA+PROBE: NOT DETECTED
HPIV3 RNA SPEC QL NAA+PROBE: DETECTED
HPIV4 RNA SPEC QL NAA+PROBE: NOT DETECTED
IMM GRANULOCYTES # BLD: 0 10E3/UL
IMM GRANULOCYTES NFR BLD: 1 %
LYMPHOCYTES # BLD AUTO: 0.2 10E3/UL (ref 0.8–5.3)
LYMPHOCYTES NFR BLD AUTO: 4 %
M PNEUMO DNA SPEC QL NAA+PROBE: NOT DETECTED
MCH RBC QN AUTO: 32.8 PG (ref 26.5–33)
MCHC RBC AUTO-ENTMCNC: 33.3 G/DL (ref 31.5–36.5)
MCV RBC AUTO: 99 FL (ref 78–100)
MONOCYTES # BLD AUTO: 0.5 10E3/UL (ref 0–1.3)
MONOCYTES NFR BLD AUTO: 8 %
NEUTROPHILS # BLD AUTO: 4.9 10E3/UL (ref 1.6–8.3)
NEUTROPHILS NFR BLD AUTO: 84 %
NRBC # BLD AUTO: 0 10E3/UL
NRBC BLD AUTO-RTO: 0 /100
PLATELET # BLD AUTO: 101 10E3/UL (ref 150–450)
POTASSIUM BLD-SCNC: 3.4 MMOL/L (ref 3.4–5.3)
RBC # BLD AUTO: 3.44 10E6/UL (ref 4.4–5.9)
RSV RNA SPEC QL NAA+PROBE: NOT DETECTED
RSV RNA SPEC QL NAA+PROBE: NOT DETECTED
RV+EV RNA SPEC QL NAA+PROBE: NOT DETECTED
SODIUM SERPL-SCNC: 139 MMOL/L (ref 133–144)
WBC # BLD AUTO: 5.9 10E3/UL (ref 4–11)

## 2021-12-23 PROCEDURE — 87633 RESP VIRUS 12-25 TARGETS: CPT

## 2021-12-23 PROCEDURE — G0463 HOSPITAL OUTPT CLINIC VISIT: HCPCS

## 2021-12-23 PROCEDURE — 80048 BASIC METABOLIC PNL TOTAL CA: CPT

## 2021-12-23 PROCEDURE — 85025 COMPLETE CBC W/AUTO DIFF WBC: CPT

## 2021-12-23 PROCEDURE — 250N000011 HC RX IP 250 OP 636

## 2021-12-23 PROCEDURE — 36591 DRAW BLOOD OFF VENOUS DEVICE: CPT

## 2021-12-23 PROCEDURE — 87581 M.PNEUMON DNA AMP PROBE: CPT

## 2021-12-23 PROCEDURE — 99214 OFFICE O/P EST MOD 30 MIN: CPT

## 2021-12-23 RX ORDER — HEPARIN SODIUM (PORCINE) LOCK FLUSH IV SOLN 100 UNIT/ML 100 UNIT/ML
5 SOLUTION INTRAVENOUS ONCE
Status: COMPLETED | OUTPATIENT
Start: 2021-12-23 | End: 2021-12-23

## 2021-12-23 RX ORDER — CODEINE PHOSPHATE AND GUAIFENESIN 10; 100 MG/5ML; MG/5ML
1-2 SOLUTION ORAL EVERY 4 HOURS PRN
Qty: 180 ML | Refills: 0 | Status: SHIPPED | OUTPATIENT
Start: 2021-12-23 | End: 2022-07-26

## 2021-12-23 RX ORDER — BENZONATATE 100 MG/1
100 CAPSULE ORAL 3 TIMES DAILY PRN
Qty: 30 CAPSULE | Refills: 0 | Status: SHIPPED | OUTPATIENT
Start: 2021-12-23 | End: 2022-07-26

## 2021-12-23 RX ADMIN — Medication 5 ML: at 06:52

## 2021-12-23 ASSESSMENT — PAIN SCALES - GENERAL: PAINLEVEL: NO PAIN (1)

## 2021-12-23 NOTE — PROGRESS NOTES
BMT Clinic Note     Juvenal Blake is a 55 year old male PMH significant for refractory NHL, s/p Yescarta CAR-T cellular therapy. Now enrolled on , receiving tod (without rituxan)      HPI: Juvenal is doing okay. He continues to have cough and cold symptoms. He denies fever, in the last week cough/sore thraot worse than it had been. Started levaquin Monday and cxr clear. He has not noticed improvement to date. SOB is not worse but does have irene, not worse over the last few days. Covid neg this week. Open to RVP swab. No new issues. Eating and drinking okay.     ROS: 8 point ROS negative except as above.      Physical Exam:   Vitals stable  GA: NAD. Appears as stated age.  HEENT: PERRL, OP Clear  Neck: Supple, no JVD. Neck fullness without lymphadenopathy   CV: RRR, no mrg  Lungs: clear in bases, expiratory wheeze right upper lung, left upper clear as well.   Abd: Soft, nt, nd  Lymph:- no groin lymph node exam today.   Ext: 1+ edema. Warm  Neuro: AAO, moving all ext. Symmetric face  Skin: warm, no rashes  Psyc: Appropriate and cooperative  Access: peripheral access                                                                                                                                                                ECO    Labs:  Lab Results   Component Value Date    WBC 5.2 2021    ANEU 0.6 (L) 2021    HGB 11.3 (L) 2021    HCT 32.9 (L) 2021     (L) 2021     2021    POTASSIUM 4.2 2021    CHLORIDE 109 2021    CO2 25 2021     (H) 2021    BUN 15 2021    CR 1.28 (H) 2021    MAG 2.0 2021    INR 1.05 2021    BILITOTAL 0.5 2021    AST 25 2021    ALT 48 2021    ALKPHOS 100 2021    PROTTOTAL 6.5 (L) 2021    ALBUMIN 3.8 2021       Assessment and Plan:   Juvenal Blake is a 55 year old male PMH significant for refractory NHL, undergoing  NK infusion. Admitted with  rituxan (biosimilar) infusion reaction 6/24 and remained admitted through completion of lymphodepleting chemo and  NK cell infusion.      1. Follicular Lymphoma:   - relapsed after BR, [facility], NAM NK and now . He has had idelalisib as a bridge and responded.  - Collection on 12/13. We discussed the lymphodepleting chemotherapy followed by CAR-T cell infusion.  - We discussed the potential benefits and risks. In the Zuma-5 study there was a response rate of >90%, as well as a potential of CRS and neurotoxicity in about 10-20% of patients. We also discussed side effects including nausea, vomiting, fevers, CRS, neurotoxicity.  - He signed consents for the Yescarta trial as well as the biopsy of the groin lymph node.  - Closed 12/7 (progression after Yescarta).   - 12/21 Bridging: tod only (no rituxan due to reaction last cycle), tumor biopsy done 12/20.   Cells should be ready ~Jan 4.    2. HEME: Keep hgb >7g/dL, plt>10,000.  - No anticipated transfusion needs next week.    Mild anemia and thrombocytopenia secondary to chemotherapy/lymphoma. No transfusions needed.     3 ID: cough/cold symptoms for last several weeks CXR negative.  -Covid neg Rhino virus may still be lingering. Still, pt prefers to use levaquin at 500mg for a week as this has been helpful for him in the past. While not clearly clinically indicated at this time, CXR okay and no fever, will allow this to try to clear symptoms prior to cell therapy  - Covid neg 12/7. No need for nasal swab as it would not change my management.   acyclovir 800mg bid through 1 year as shingles prophylaxis, Bactrim (for 6 months post therapy, then check T cell subsets to determine if he has ongoing need).  - hx C diff colitis   - s/p 3 covid shots; first two were pre- and booster was after.  Defer to car -t team regarding if revaccination needed post CART.   - Rhinovirus+10/6. CXR with mild bronchial thickening but no PNA. IgG low at 240, got IVIG last month.  No real indication for continued IVIG replacement.    4. CV: No complaints of chest pain.   Cardiology cleared him to proceed with no further testing. The CT angiogram showed no lesion requiring stenting or bypass.    5. :   Post radical prostatectomy and bilateral lymph node dissection. November 15, 2017. Prostatic adenocarcinoma [place] 4+3 = 7 with tertiary pattern 5. Tumor involves 45% of total surface area. Positive for extensive extraprostatic extension. Positive for bilateral seminal vesicle invasion. Multiple margins positive. Lymphovascular invasion not identified. Perineural invasion was noted. Lymph nodes negative. 3 were examined (2 right obturator and 1 left obturator). Completed radiation to the prostate fossa and pelvic lymph nodes at [facility] early May 2018. He received 4500 cGy in 25 fractions. He then had 2340 cGy boost in 13 fractions to the prostatic fossa, for a total dose of 6240 cGy in 38 treatment fractions delivered over 54 days. He did not receive hormonal therapy:    9/10 PSA: 0.71 - no concerns.      6.FEN/Renal:   - Cr mildly elevated.    - Lytes stable.      7. GI:    #intermittent nausea -  medical cannibis.  #Abdominal Pain secondary to cancer. He is in remission, but still has this pain.      8. Dental: s/p root canal; well healed and mouth feels good.     Plan: given polatuximab monotherapy as bridging per Dr Terry- compelted 12/21.   - 12/21 - started levaquin x 7days for cough.   - Obtain RVP today. 12/21 cxr clear. If persistent cough may consider chest CT prior to cellular therapy.       30 minutes was spent on this appointment which included patient exam, chart review, orders required and review, consult with ordering chemotherapy and change in treatment plan, as well as future therapy anticipated.    Shannen Bauer PA-C  [phone]

## 2021-12-23 NOTE — LETTER
2021         RE: Juvenal Blake  [address] [place] [address]        Dear Colleague,    Thank you for referring your patient, Juvenal Blake, to the [facility] BLOOD AND MARROW TRANSPLANT PROGRAM [place]. Please see a copy of my visit note below.    BMT Clinic Note     Juvenal Blake is a 55 year old male PMH significant for refractory NHL, s/p Yescarta CAR-T cellular therapy. Now enrolled on , receiving tod (without rituxan)      HPI: Juvenal is doing okay. He continues to have cough and cold symptoms. He denies fever, in the last week cough/sore thraot worse than it had been. Started levaquin Monday and cxr clear. He has not noticed improvement to date. SOB is not worse but does have irene, not worse over the last few days. Covid neg this week. Open to RVP swab. No new issues. Eating and drinking okay.     ROS: 8 point ROS negative except as above.      Physical Exam:   Vitals stable  GA: NAD. Appears as stated age.  HEENT: PERRL, OP Clear  Neck: Supple, no JVD. Neck fullness without lymphadenopathy   CV: RRR, no mrg  Lungs: clear in bases, expiratory wheeze right upper lung, left upper clear as well.   Abd: Soft, nt, nd  Lymph:- no groin lymph node exam today.   Ext: 1+ edema. Warm  Neuro: AAO, moving all ext. Symmetric face  Skin: warm, no rashes  Psyc: Appropriate and cooperative  Access: peripheral access                                                                                                                                                                ECO    Labs:  Lab Results   Component Value Date    WBC 5.2 2021    ANEU 0.6 (L) 2021    HGB 11.3 (L) 2021    HCT 32.9 (L) 2021     (L) 2021     2021    POTASSIUM 4.2 2021    CHLORIDE 109 2021    CO2 25 2021     (H) 2021    BUN 15 2021    CR 1.28 (H) 2021    MAG 2.0 2021    INR 1.05 2021    BILITOTAL 0.5  12/21/2021    AST 25 12/21/2021    ALT 48 12/21/2021    ALKPHOS 100 12/21/2021    PROTTOTAL 6.5 (L) 12/21/2021    ALBUMIN 3.8 12/21/2021       Assessment and Plan:   Juvenal Blake is a 55 year old male PMH significant for refractory NHL, undergoing  NK infusion. Admitted with rituxan (biosimilar) infusion reaction 6/24 and remained admitted through completion of lymphodepleting chemo and  NK cell infusion.      1. Follicular Lymphoma:   - relapsed after BR, [facility], NAM NK and now . He has had idelalisib as a bridge and responded.  - Collection on 12/13. We discussed the lymphodepleting chemotherapy followed by CAR-T cell infusion.  - We discussed the potential benefits and risks. In the Zuma-5 study there was a response rate of >90%, as well as a potential of CRS and neurotoxicity in about 10-20% of patients. We also discussed side effects including nausea, vomiting, fevers, CRS, neurotoxicity.  - He signed consents for the Yescarta trial as well as the biopsy of the groin lymph node.  - Closed 12/7 (progression after Yescarta).   - 12/21 Bridging: tod only (no rituxan due to reaction last cycle), tumor biopsy done 12/20.   Cells should be ready ~Jan 4.    2. HEME: Keep hgb >7g/dL, plt>10,000.  - No anticipated transfusion needs next week.    Mild anemia and thrombocytopenia secondary to chemotherapy/lymphoma. No transfusions needed.     3 ID: cough/cold symptoms for last several weeks CXR negative.  -Covid neg Rhino virus may still be lingering. Still, pt prefers to use levaquin at 500mg for a week as this has been helpful for him in the past. While not clearly clinically indicated at this time, CXR okay and no fever, will allow this to try to clear symptoms prior to cell therapy  - Covid neg 12/7. No need for nasal swab as it would not change my management.   acyclovir 800mg bid through 1 year as shingles prophylaxis, Bactrim (for 6 months post therapy, then check T cell subsets to determine if he  has ongoing need).  - hx C diff colitis   - s/p 3 covid shots; first two were pre- and booster was after.  Defer to car -t team regarding if revaccination needed post CART.   - Rhinovirus+10/6. CXR with mild bronchial thickening but no PNA. IgG low at 240, got IVIG last month. No real indication for continued IVIG replacement.    4. CV: No complaints of chest pain.   Cardiology cleared him to proceed with no further testing. The CT angiogram showed no lesion requiring stenting or bypass.    5. :   Post radical prostatectomy and bilateral lymph node dissection. November 15, 2017. Prostatic adenocarcinoma [place] 4+3 = 7 with tertiary pattern 5. Tumor involves 45% of total surface area. Positive for extensive extraprostatic extension. Positive for bilateral seminal vesicle invasion. Multiple margins positive. Lymphovascular invasion not identified. Perineural invasion was noted. Lymph nodes negative. 3 were examined (2 right obturator and 1 left obturator). Completed radiation to the prostate fossa and pelvic lymph nodes at [facility] early May 2018. He received 4500 cGy in 25 fractions. He then had 2340 cGy boost in 13 fractions to the prostatic fossa, for a total dose of 6240 cGy in 38 treatment fractions delivered over 54 days. He did not receive hormonal therapy:    9/10 PSA: 0.71 - no concerns.      6.FEN/Renal:   - Cr mildly elevated.    - Lytes stable.      7. GI:    #intermittent nausea -  medical cannibis.  #Abdominal Pain secondary to cancer. He is in remission, but still has this pain.      8. Dental: s/p root canal; well healed and mouth feels good.     Plan: given polatuximab monotherapy as bridging per Dr Terry- compelted 12/21.   - 12/21 - started levaquin x 7days for cough.   - Obtain RVP today. 12/21 cxr clear. If persistent cough may consider chest CT prior to cellular therapy.       30 minutes was spent on this appointment which included patient exam, chart review, orders required  and review, consult with ordering chemotherapy and change in treatment plan, as well as future therapy anticipated.    Shannen Bauer PA-C  [phone]            Again, thank you for allowing me to participate in the care of your patient.        Sincerely,        BMT Advanced Practice Provider

## 2021-12-23 NOTE — NURSING NOTE
"Oncology Rooming Note    December 23, 2021 7:09 AM   Juvenal Blake is a 56 year old male who presents for:    Chief Complaint   Patient presents with     Port Draw     Labs drawn via port by rn in lab. VS taken.     Oncology Clinic Visit     Follicular lymphoma (H)     Initial Vitals: /78 (BP Location: Right arm, Patient Position: Sitting, Cuff Size: Adult Large)   Pulse 82   Temp 98.9  F (37.2  C) (Oral)   Resp 16   Wt 106.1 kg (233 lb 12.8 oz)   SpO2 97%   BMI 34.53 kg/m   Estimated body mass index is 34.53 kg/m  as calculated from the following:    Height as of 12/20/21: 1.753 m (5' 9\").    Weight as of this encounter: 106.1 kg (233 lb 12.8 oz). Body surface area is 2.27 meters squared.  No Pain (1) Comment: Data Unavailable   No LMP for male patient.  Allergies reviewed: Yes  Medications reviewed: Yes    Medications: MEDICATION REFILLS NEEDED TODAY. Provider was notified.  Pharmacy name entered into [employer]: [id number] - [place], [place] - [address] AT [facility]    Clinical concerns: Please discuss ongoing cold/dry cough- refill ROBITUSSIN.        Bárbara Krueger LPN December 23, 2021 7:10 AM                "

## 2021-12-23 NOTE — PATIENT INSTRUCTIONS - HE
Patient Education     Immune Globulin Solution for injection  What is this medicine?  IMMUNE GLOBULIN (im MUNE GLOB jones solano) helps to prevent or reduce the severity of certain infections in patients who are at risk. This medicine is collected from the pooled blood of many donors. It is used to treat immune system problems, thrombocytopenia, and Kawasaki syndrome.  This medicine may be used for other purposes; ask your health care provider or pharmacist if you have questions.  What should I tell my health care provider before I take this medicine?  They need to know if you have any of these conditions:    diabetes    extremely low or no immune antibodies in the blood    heart disease    history of blood clots    hyperprolinemia    infection in the blood, sepsis    kidney disease    taking medicine that may change kidney function - ask your health care provider about your medicine    an unusual or allergic reaction to human immune globulin, albumin, maltose, sucrose, polysorbate 80, other medicines, foods, dyes, or preservatives    pregnant or trying to get pregnant    breast-feeding  How should I use this medicine?  This medicine is for injection into a muscle or infusion into a vein or skin. It is usually given by a health care professional in a hospital or clinic setting.  In rare cases, some brands of this medicine might be given at home. You will be taught how to give this medicine. Use exactly as directed. Take your medicine at regular intervals. Do not take your medicine more often than directed.  Talk to your pediatrician regarding the use of this medicine in children. Special care may be needed.  Overdosage: If you think you have taken too much of this medicine contact a poison control center or emergency room at once.  NOTE: This medicine is only for you. Do not share this medicine with others.  What if I miss a dose?  It is important not to miss your dose. Call your doctor or health care professional if  you are unable to keep an appointment. If you give yourself the medicine and you miss a dose, take it as soon as you can. If it is almost time for your next dose, take only that dose. Do not take double or extra doses.  What may interact with this medicine?    aspirin and aspirin-like medicines    cisplatin    cyclosporine    medicines for infection like acyclovir, adefovir, amphotericin B, bacitracin, cidofovir, foscarnet, ganciclovir, gentamicin, pentamidine, vancomycin    NSAIDS, medicines for pain and inflammation, like ibuprofen or naproxen    pamidronate    vaccines    zoledronic acid  This list may not describe all possible interactions. Give your health care provider a list of all the medicines, herbs, non-prescription drugs, or dietary supplements you use. Also tell them if you smoke, drink alcohol, or use illegal drugs. Some items may interact with your medicine.  What should I watch for while using this medicine?  Your condition will be monitored carefully while you are receiving this medicine.  This medicine is made from pooled blood donations of many different people. It may be possible to pass an infection in this medicine. However, the donors are screened for infections and all products are tested for HIV and hepatitis. The medicine is treated to kill most or all bacteria and viruses. Talk to your doctor about the risks and benefits of this medicine.  Do not have vaccinations for at least 14 days before, or until at least 3 months after receiving this medicine.  What side effects may I notice from receiving this medicine?  Side effects that you should report to your doctor or health care professional as soon as possible:    allergic reactions like skin rash, itching or hives, swelling of the face, lips, or tongue    breathing problems    chest pain or tightness    fever, chills    headache with nausea, vomiting    neck pain or difficulty moving neck    pain when moving eyes    pain, swelling, warmth  in the leg    problems with balance, talking, walking    sudden weight gain    swelling of the ankles, feet, hands    trouble passing urine or change in the amount of urine  Side effects that usually do not require medical attention (report to your doctor or health care professional if they continue or are bothersome):    dizzy, drowsy    flushing    increased sweating    leg cramps    muscle aches and pains    pain at site where injected  This list may not describe all possible side effects. Call your doctor for medical advice about side effects. You may report side effects to FDA at 3-659-FDA-1665.  Where should I keep my medicine?  Keep out of the reach of children.  This drug is usually given in a hospital or clinic and will not be stored at home.  In rare cases, some brands of this medicine may be given at home. If you are using this medicine at home, you will be instructed on how to store this medicine. Throw away any unused medicine after the expiration date on the label.  NOTE: This sheet is a summary. It may not cover all possible information. If you have questions about this medicine, talk to your doctor, pharmacist, or health care provider.  NOTE:This sheet is a summary. It may not cover all possible information. If you have questions about this medicine, talk to your doctor, pharmacist, or health care provider. Copyright  2015 Gold Standard            For information on Fall & Injury Prevention, visit: [email or web address] OR  [email or web address] OR  https://www.cdc.gov/steadi/patient.html

## 2021-12-24 LAB
PATH REPORT.COMMENTS IMP SPEC: NORMAL
PATH REPORT.FINAL DX SPEC: NORMAL
PATH REPORT.GROSS SPEC: NORMAL
PATH REPORT.MICROSCOPIC SPEC OTHER STN: NORMAL
PATH REPORT.RELEVANT HX SPEC: NORMAL
PHOTO IMAGE: NORMAL

## 2021-12-27 DIAGNOSIS — C82.00 FOLLICULAR LYMPHOMA GRADE I, UNSPECIFIED BODY REGION (H): Primary | ICD-10-CM

## 2021-12-27 DIAGNOSIS — C82.08 FOLLICULAR LYMPHOMA GRADE I, LYMPH NODES OF MULTIPLE SITES (H): ICD-10-CM

## 2021-12-28 NOTE — PROGRESS NOTES
BMT Clinic Note     Juvenal Blake is a 55 year old male PMH significant for refractory NHL, s/p Yescarta CAR-T cellular therapy. Now enrolled on , receiving tod (without rituxan)      HPI: Persistent cough. Has been going on for 3-4 weeks. No fevers. Sometimes cough is productive. Admits to some dyspnea with cough which is stable. Less appetite and some nausea. Stable stooling (has some degree of chronic diarrhea). No bleeding. No new LAD. Stable node in right groin. No edema.    ROS: 8 point ROS negative except as above.      Physical Exam:   /68 (BP Location: Right arm, Patient Position: Sitting, Cuff Size: Adult Large)   Pulse 81   Temp 98  F (36.7  C) (Oral)   Resp (!) 99   Wt 106.6 kg (235 lb 1.6 oz)   BMI 34.72 kg/m      GA: NAD. Appears as stated age. Appears tired and a bit hoarse  HEENT: OP Clear  Neck: No lymphadenopathy   CV: RRR, no mrg  Lungs: CTAB, breathing comfortably on room air  Abd: Soft, nt, nd  Ext: 1+ edema. Warm  Neuro: AAO, moving all ext. Symmetric face  Skin: warm, no rashes  Psyc: Appropriate and cooperative  Access: peripheral access                                                                                                                                                                ECO    Labs:  Lab Results   Component Value Date    WBC 4.8 2021    ANEU 0.6 (L) 2021    HGB 11.0 (L) 2021    HCT 32.1 (L) 2021     (L) 2021     2021    POTASSIUM 3.7 2021    CHLORIDE 104 2021    CO2 23 2021     (H) 2021    BUN 10 2021    CR 1.21 2021    MAG 1.8 2021    INR 1.01 2021    BILITOTAL 0.5 2021    AST 81 (H) 2021     (H) 2021    ALKPHOS 115 2021    PROTTOTAL 6.6 (L) 2021    ALBUMIN 3.7 2021       Assessment and Plan:   Juvenal Blake is a 55 year old male PMH significant for refractory NHL, undergoing  NK infusion.  Admitted with rituxan (biosimilar) infusion reaction 6/24 and remained admitted through completion of lymphodepleting chemo and  NK cell infusion.      1. Follicular Lymphoma:   - relapsed after BR, OCHOP, NAM NK and now . He has had idelalisib as a bridge and responded.  - Collection on 12/13. We discussed the lymphodepleting chemotherapy followed by CAR-T cell infusion.  - We discussed the potential benefits and risks. In the Zuma-5 study there was a response rate of >90%, as well as a potential of CRS and neurotoxicity in about 10-20% of patients. We also discussed side effects including nausea, vomiting, fevers, CRS, neurotoxicity.  - He signed consents for the Yescarta trial as well as the biopsy of the groin lymph node.  - Closed 12/7 (progression after Yescarta).   - 12/21 Bridging: tod only (no rituxan due to reaction last cycle), tumor biopsy done 12/20.   Cells should be ready ~Jan 4.    2. HEME: Keep hgb >7g/dL, plt>10,000.  - No anticipated transfusion needs next week.    Mild anemia and thrombocytopenia secondary to chemotherapy/lymphoma. No transfusions needed.     3 ID: cough/cold symptoms for last several weeks CXR negative.  - 12/23 RVP shows rhinovirus +. Was treated with leva prior to this resulting (12/21-12/27). 12/29 ongoing cough. Rule out other infectious etiologies: strep pneumo and legionella antigen in urine, fungitell and galacto and crypto = pending. Has PET on 1/3, so no chest CT ordered today.   - medical management: already using mucinex, albuterol inhaler, tessalon perles, and cough syrup - declined offer to send any of these since he already has them  - Prophy: acyclovir 800mg bid through 1 year as shingles prophylaxis, Bactrim (for 6 months post therapy, then check T cell subsets to determine if he has ongoing need).  - hx C diff colitis   - s/p 3 covid shots; first two were pre- and booster was after.  Defer to car -t team regarding if revaccination needed post  CART.   - Rhinovirus+10/6. CXR with mild bronchial thickening but no PNA. IgG low at 240, got IVIG last month. No real indication for continued IVIG replacement.    4. CV:   Cardiology cleared him to proceed with no further testing. The CT angiogram showed no lesion requiring stenting or bypass.    5. :   Post radical prostatectomy and bilateral lymph node dissection. November 15, 2017. Prostatic adenocarcinoma Maricel 4+3 = 7 with tertiary pattern 5. Tumor involves 45% of total surface area. Positive for extensive extraprostatic extension. Positive for bilateral seminal vesicle invasion. Multiple margins positive. Lymphovascular invasion not identified. Perineural invasion was noted. Lymph nodes negative. 3 were examined (2 right obturator and 1 left obturator). Completed radiation to the prostate fossa and pelvic lymph nodes at [facility] early May 2018. He received 4500 cGy in 25 fractions. He then had 2340 cGy boost in 13 fractions to the prostatic fossa, for a total dose of 6240 cGy in 38 treatment fractions delivered over 54 days. He did not receive hormonal therapy:    9/10 PSA: 0.71 - no concerns.      6.FEN/Renal:   - Cr mildly elevated.    - Lytes stable.      7. GI:    #intermittent nausea -  medical cannibis.  #Abdominal Pain secondary to cancer. He is in remission, but still has this pain.      8. Dental: s/p root canal; well healed and mouth feels good.     Plan:  - check strep pneumo, legionella, fungitell, galactomannan, crypto = pending  - sent inbasket to Dr. Terry about his ongoing pulm symptoms and about a letter from health [employer] denying coverage for chemo from 12/21    RTC for workup the next several days with PET 1/3 and Dr. Terry to close 1/4    40 minutes was spent on this appointment which included patient exam, chart review, orders required and review, consult with ordering chemotherapy and change in treatment plan, as well as future therapy anticipated.    Addendum:  strep pneumo, legionella and crypto all neg. Fungitell and galactomannan still pending  Reviewed plan with Dr. Terry. She advised to wait 10-14 days to allow more time for recovery, then repeat nasal swab. Called Juvenal to let him know 12/31    Irma Bethea PA-C  [phone]

## 2021-12-29 ENCOUNTER — ONCOLOGY VISIT (OUTPATIENT)
Dept: TRANSPLANT | Facility: CLINIC | Age: 56
End: 2021-12-29
Payer: COMMERCIAL

## 2021-12-29 ENCOUNTER — MEDICAL CORRESPONDENCE (OUTPATIENT)
Dept: TRANSPLANT | Facility: CLINIC | Age: 56
End: 2021-12-29
Payer: COMMERCIAL

## 2021-12-29 ENCOUNTER — ALLIED HEALTH/NURSE VISIT (OUTPATIENT)
Dept: TRANSPLANT | Facility: CLINIC | Age: 56
End: 2021-12-29
Payer: COMMERCIAL

## 2021-12-29 ENCOUNTER — APPOINTMENT (OUTPATIENT)
Dept: LAB | Facility: CLINIC | Age: 56
End: 2021-12-29
Payer: COMMERCIAL

## 2021-12-29 VITALS
HEART RATE: 81 BPM | RESPIRATION RATE: 99 BRPM | TEMPERATURE: 98 F | BODY MASS INDEX: 34.72 KG/M2 | WEIGHT: 235.1 LBS | SYSTOLIC BLOOD PRESSURE: 122 MMHG | DIASTOLIC BLOOD PRESSURE: 68 MMHG

## 2021-12-29 VITALS — RESPIRATION RATE: 18 BRPM | OXYGEN SATURATION: 100 %

## 2021-12-29 DIAGNOSIS — C82.00 FOLLICULAR LYMPHOMA GRADE I, UNSPECIFIED BODY REGION (H): ICD-10-CM

## 2021-12-29 DIAGNOSIS — C82.90 FOLLICULAR LYMPHOMA (H): ICD-10-CM

## 2021-12-29 DIAGNOSIS — Z86.2 PERSONAL HISTORY OF DISEASES OF BLOOD AND BLOOD-FORMING ORGANS: ICD-10-CM

## 2021-12-29 DIAGNOSIS — C85.90 NH LYMPHOMA (H): ICD-10-CM

## 2021-12-29 DIAGNOSIS — C82.08 FOLLICULAR LYMPHOMA GRADE I, LYMPH NODES OF MULTIPLE SITES (H): ICD-10-CM

## 2021-12-29 DIAGNOSIS — Z86.2 PERSONAL HISTORY OF DISEASES OF BLOOD AND BLOOD-FORMING ORGANS: Primary | ICD-10-CM

## 2021-12-29 LAB
ABO/RH(D): NORMAL
ALBUMIN SERPL-MCNC: 3.7 G/DL (ref 3.4–5)
ALBUMIN UR-MCNC: NEGATIVE MG/DL
ALP SERPL-CCNC: 115 U/L (ref 40–150)
ALT SERPL W P-5'-P-CCNC: 168 U/L (ref 0–70)
ANION GAP SERPL CALCULATED.3IONS-SCNC: 11 MMOL/L (ref 3–14)
ANTIBODY SCREEN: NEGATIVE
APPEARANCE UR: CLEAR
APTT PPP: 30 SECONDS (ref 22–38)
AST SERPL W P-5'-P-CCNC: 81 U/L (ref 0–45)
BASOPHILS # BLD AUTO: 0 10E3/UL (ref 0–0.2)
BASOPHILS NFR BLD AUTO: 0 %
BILIRUB SERPL-MCNC: 0.5 MG/DL (ref 0.2–1.3)
BILIRUB UR QL STRIP: NEGATIVE
BUN SERPL-MCNC: 10 MG/DL (ref 7–30)
CALCIUM SERPL-MCNC: 9.1 MG/DL (ref 8.5–10.1)
CHLORIDE BLD-SCNC: 104 MMOL/L (ref 94–109)
CO2 SERPL-SCNC: 23 MMOL/L (ref 20–32)
COLOR UR AUTO: YELLOW
CREAT SERPL-MCNC: 1.21 MG/DL (ref 0.66–1.25)
CRYPTOC AG SPEC QL: NEGATIVE
EBV VCA IGG SER IA-ACNC: 93.4 U/ML
EBV VCA IGG SER IA-ACNC: POSITIVE
EOSINOPHIL # BLD AUTO: 0.1 10E3/UL (ref 0–0.7)
EOSINOPHIL NFR BLD AUTO: 2 %
ERYTHROCYTE [DISTWIDTH] IN BLOOD BY AUTOMATED COUNT: 13.7 % (ref 10–15)
FERRITIN SERPL-MCNC: 181 NG/ML (ref 26–388)
FIBRINOGEN PPP-MCNC: 400 MG/DL (ref 170–490)
GFR SERPL CREATININE-BSD FRML MDRD: 69 ML/MIN/1.73M2
GLUCOSE BLD-MCNC: 134 MG/DL (ref 70–99)
GLUCOSE UR STRIP-MCNC: NEGATIVE MG/DL
HCT VFR BLD AUTO: 32.1 % (ref 40–53)
HGB BLD-MCNC: 11 G/DL (ref 13.3–17.7)
HGB UR QL STRIP: NEGATIVE
HSV1 IGG SERPL QL IA: 2.3 INDEX
HSV1 IGG SERPL QL IA: ABNORMAL
HSV2 IGG SERPL QL IA: 0.26 INDEX
HSV2 IGG SERPL QL IA: ABNORMAL
IGG SERPL-MCNC: 353 MG/DL (ref 610–1616)
IMM GRANULOCYTES # BLD: 0 10E3/UL
IMM GRANULOCYTES NFR BLD: 1 %
INR PPP: 1.01 (ref 0.85–1.15)
KETONES UR STRIP-MCNC: NEGATIVE MG/DL
L PNEUMO1 AG UR QL IA: NEGATIVE
LDH SERPL L TO P-CCNC: 208 U/L (ref 85–227)
LEUKOCYTE ESTERASE UR QL STRIP: NEGATIVE
LYMPHOCYTES # BLD AUTO: 0.2 10E3/UL (ref 0.8–5.3)
LYMPHOCYTES NFR BLD AUTO: 4 %
MAGNESIUM SERPL-MCNC: 1.8 MG/DL (ref 1.6–2.3)
MCH RBC QN AUTO: 33.1 PG (ref 26.5–33)
MCHC RBC AUTO-ENTMCNC: 34.3 G/DL (ref 31.5–36.5)
MCV RBC AUTO: 97 FL (ref 78–100)
MONOCYTES # BLD AUTO: 0.7 10E3/UL (ref 0–1.3)
MONOCYTES NFR BLD AUTO: 15 %
NEUTROPHILS # BLD AUTO: 3.7 10E3/UL (ref 1.6–8.3)
NEUTROPHILS NFR BLD AUTO: 78 %
NITRATE UR QL: NEGATIVE
NRBC # BLD AUTO: 0 10E3/UL
NRBC BLD AUTO-RTO: 0 /100
PH UR STRIP: 5 [PH] (ref 5–7)
PHOSPHATE SERPL-MCNC: 2 MG/DL (ref 2.5–4.5)
PLATELET # BLD AUTO: 122 10E3/UL (ref 150–450)
POTASSIUM BLD-SCNC: 3.7 MMOL/L (ref 3.4–5.3)
PROT SERPL-MCNC: 6.6 G/DL (ref 6.8–8.8)
RBC # BLD AUTO: 3.32 10E6/UL (ref 4.4–5.9)
RBC URINE: <1 /HPF
S PNEUM AG SPEC QL: NEGATIVE
SARS-COV-2 RNA RESP QL NAA+PROBE: NEGATIVE
SODIUM SERPL-SCNC: 138 MMOL/L (ref 133–144)
SP GR UR STRIP: 1.01 (ref 1–1.03)
SPECIMEN EXPIRATION DATE: NORMAL
URATE SERPL-MCNC: 4.4 MG/DL (ref 3.5–7.2)
UROBILINOGEN UR STRIP-MCNC: NORMAL MG/DL
WBC # BLD AUTO: 4.8 10E3/UL (ref 4–11)
WBC URINE: 0 /HPF

## 2021-12-29 PROCEDURE — 85730 THROMBOPLASTIN TIME PARTIAL: CPT

## 2021-12-29 PROCEDURE — 83735 ASSAY OF MAGNESIUM: CPT

## 2021-12-29 PROCEDURE — 85384 FIBRINOGEN ACTIVITY: CPT

## 2021-12-29 PROCEDURE — 87899 AGENT NOS ASSAY W/OPTIC: CPT | Performed by: PHYSICIAN ASSISTANT

## 2021-12-29 PROCEDURE — G0463 HOSPITAL OUTPT CLINIC VISIT: HCPCS

## 2021-12-29 PROCEDURE — 84100 ASSAY OF PHOSPHORUS: CPT

## 2021-12-29 PROCEDURE — 86665 EPSTEIN-BARR CAPSID VCA: CPT

## 2021-12-29 PROCEDURE — 82728 ASSAY OF FERRITIN: CPT

## 2021-12-29 PROCEDURE — 87305 ASPERGILLUS AG IA: CPT | Performed by: PHYSICIAN ASSISTANT

## 2021-12-29 PROCEDURE — 87449 NOS EACH ORGANISM AG IA: CPT | Mod: XU | Performed by: PHYSICIAN ASSISTANT

## 2021-12-29 PROCEDURE — 86696 HERPES SIMPLEX TYPE 2 TEST: CPT

## 2021-12-29 PROCEDURE — 86803 HEPATITIS C AB TEST: CPT

## 2021-12-29 PROCEDURE — 36591 DRAW BLOOD OFF VENOUS DEVICE: CPT

## 2021-12-29 PROCEDURE — 85025 COMPLETE CBC W/AUTO DIFF WBC: CPT

## 2021-12-29 PROCEDURE — 83615 LACTATE (LD) (LDH) ENZYME: CPT

## 2021-12-29 PROCEDURE — 36591 DRAW BLOOD OFF VENOUS DEVICE: CPT | Performed by: PHYSICIAN ASSISTANT

## 2021-12-29 PROCEDURE — 80053 COMPREHEN METABOLIC PANEL: CPT

## 2021-12-29 PROCEDURE — 99215 OFFICE O/P EST HI 40 MIN: CPT

## 2021-12-29 PROCEDURE — 84550 ASSAY OF BLOOD/URIC ACID: CPT

## 2021-12-29 PROCEDURE — 87516 HEPATITIS B DNA AMP PROBE: CPT

## 2021-12-29 PROCEDURE — 82784 ASSAY IGA/IGD/IGG/IGM EACH: CPT

## 2021-12-29 PROCEDURE — 86900 BLOOD TYPING SEROLOGIC ABO: CPT

## 2021-12-29 PROCEDURE — 87899 AGENT NOS ASSAY W/OPTIC: CPT | Mod: 59

## 2021-12-29 PROCEDURE — 83021 HEMOGLOBIN CHROMOTOGRAPHY: CPT

## 2021-12-29 PROCEDURE — 250N000011 HC RX IP 250 OP 636

## 2021-12-29 PROCEDURE — 85610 PROTHROMBIN TIME: CPT

## 2021-12-29 PROCEDURE — 81001 URINALYSIS AUTO W/SCOPE: CPT

## 2021-12-29 PROCEDURE — U0003 INFECTIOUS AGENT DETECTION BY NUCLEIC ACID (DNA OR RNA); SEVERE ACUTE RESPIRATORY SYNDROME CORONAVIRUS 2 (SARS-COV-2) (CORONAVIRUS DISEASE [COVID-19]), AMPLIFIED PROBE TECHNIQUE, MAKING USE OF HIGH THROUGHPUT TECHNOLOGIES AS DESCRIBED BY CMS-2020-01-R: HCPCS

## 2021-12-29 RX ORDER — HEPARIN SODIUM (PORCINE) LOCK FLUSH IV SOLN 100 UNIT/ML 100 UNIT/ML
5 SOLUTION INTRAVENOUS ONCE
Status: COMPLETED | OUTPATIENT
Start: 2021-12-29 | End: 2021-12-29

## 2021-12-29 RX ORDER — HEPARIN SODIUM (PORCINE) LOCK FLUSH IV SOLN 100 UNIT/ML 100 UNIT/ML
5 SOLUTION INTRAVENOUS
Status: COMPLETED | OUTPATIENT
Start: 2021-12-29 | End: 2021-12-29

## 2021-12-29 RX ADMIN — Medication 5 ML: at 08:07

## 2021-12-29 RX ADMIN — SODIUM CHLORIDE, PRESERVATIVE FREE 5 ML: 5 INJECTION INTRAVENOUS at 16:27

## 2021-12-29 ASSESSMENT — PAIN SCALES - GENERAL: PAINLEVEL: NO PAIN (1)

## 2021-12-29 NOTE — NURSING NOTE
"Oncology Rooming Note    December 29, 2021 8:31 AM   Juvenal Blake is a 56 year old male who presents for:    Chief Complaint   Patient presents with     Port Draw     labs drawn from port by rn.  vs taken     Oncology Clinic Visit     FOLLICULAR LYMPHOMA     Initial Vitals: /68 (BP Location: Right arm, Patient Position: Sitting, Cuff Size: Adult Large)   Pulse 81   Temp 98  F (36.7  C) (Oral)   Resp (!) 99   Wt 106.6 kg (235 lb 1.6 oz)   BMI 34.72 kg/m   Estimated body mass index is 34.72 kg/m  as calculated from the following:    Height as of 12/20/21: 1.753 m (5' 9\").    Weight as of this encounter: 106.6 kg (235 lb 1.6 oz). Body surface area is 2.28 meters squared.  No Pain (1) Comment: Data Unavailable   No LMP for male patient.  Allergies reviewed: Yes  Medications reviewed: Yes    Medications: Medication refills not needed today.  Pharmacy name entered into [employer]: [id number] - [place], [place] - [address] AT [facility]    Clinical concerns: None.        Carmela Montano CMA            "

## 2021-12-29 NOTE — LETTER
Date:January 5, 2022      Provider requested that no letter be sent. Do not send.       [facility]

## 2021-12-29 NOTE — PROGRESS NOTES
Hx lymphoma here for initial workup appointment. VSS, A&Ox4. Allergies and medications reviewed. Calendar of upcoming clinic visits discussed at length. Map and locations of appointments explained. Clinic consents obtained. Covid and UA obtained and sent to lab. Labs drawn via port and sent to lab. Folder given and patient advised to look through before meeting with nurse coordinator. Frailty paperwork filled out and filed. Total time spent 1 hour.

## 2021-12-30 ENCOUNTER — VIRTUAL VISIT (OUTPATIENT)
Dept: TRANSPLANT | Facility: CLINIC | Age: 56
End: 2021-12-30
Payer: COMMERCIAL

## 2021-12-30 DIAGNOSIS — C85.90 NH LYMPHOMA (H): ICD-10-CM

## 2021-12-30 DIAGNOSIS — C82.00 FOLLICULAR LYMPHOMA GRADE I, UNSPECIFIED BODY REGION (H): Primary | ICD-10-CM

## 2021-12-30 DIAGNOSIS — Z71.9 VISIT FOR COUNSELING: Primary | ICD-10-CM

## 2021-12-30 DIAGNOSIS — Z86.2 PERSONAL HISTORY OF DISEASES OF BLOOD AND BLOOD-FORMING ORGANS: ICD-10-CM

## 2021-12-30 DIAGNOSIS — C82.90 FOLLICULAR LYMPHOMA (H): ICD-10-CM

## 2021-12-30 ASSESSMENT — PATIENT HEALTH QUESTIONNAIRE - PHQ9
SUM OF ALL RESPONSES TO PHQ QUESTIONS 1-9: 3
5. POOR APPETITE OR OVEREATING: NOT AT ALL

## 2021-12-30 ASSESSMENT — ANXIETY QUESTIONNAIRES
3. WORRYING TOO MUCH ABOUT DIFFERENT THINGS: NOT AT ALL
6. BECOMING EASILY ANNOYED OR IRRITABLE: NOT AT ALL
2. NOT BEING ABLE TO STOP OR CONTROL WORRYING: NOT AT ALL
7. FEELING AFRAID AS IF SOMETHING AWFUL MIGHT HAPPEN: NOT AT ALL
1. FEELING NERVOUS, ANXIOUS, OR ON EDGE: NEARLY EVERY DAY
GAD7 TOTAL SCORE: 4
IF YOU CHECKED OFF ANY PROBLEMS ON THIS QUESTIONNAIRE, HOW DIFFICULT HAVE THESE PROBLEMS MADE IT FOR YOU TO DO YOUR WORK, TAKE CARE OF THINGS AT HOME, OR GET ALONG WITH OTHER PEOPLE: NOT DIFFICULT AT ALL
5. BEING SO RESTLESS THAT IT IS HARD TO SIT STILL: SEVERAL DAYS

## 2021-12-30 NOTE — LETTER
"    12/30/2021         RE: Juvenal Blake  [address] [place] [address]        Dear Colleague,    Thank you for referring your patient, Juvenal Blake, to the [facility] [place]. Please see a copy of my visit note below.    Pharmacy Assessment - Pre-Stem Cell Transplant    Assessments & Recommendations:  1) When LD chemo starts, pharmacy is then to ensure 2 tocilizumab doses are available in the hospital for this patient in case of CRS.  2) Avoid Ondansetron due to severe itching.  May use Kytril (granisetron) to prevent or treat N/V.    If this patient is admitted under observation, the patient may bring in their own supply of the following medication for use in the hospital:  1) Albuterol Inhaler  2) Beclomethasone Inhaler  3) Medical Cannabis  All other meds will be supplied by pharmacy  -Per \"Medications Not Supplied by Pharmacy\" policy (available on [employer])    History of Present Illness:  Juvenal Blake is a 56 year old year old male diagnosed with Follicular Lymphoma.  He has been treated with BR, OCHOP, NAM NK, , idelalisib, and Mike/BR.  He is now being work up for Yescarta CAR-T on protocol 2017-45, which utilizes Flu/Cy as a lympho depleting chemo regimen.    Pertinent labs/tests:  Viral Testing:  CMV(+) / HSV(+) / EBV(+)  Ejection Fraction: 50-55% (12/3)  QTc: 418 msec (12/3)    Weights:   Wt Readings from Last 3 Encounters:   12/29/21 106.6 kg (235 lb 1.6 oz)   12/23/21 106.1 kg (233 lb 12.8 oz)   12/21/21 107.9 kg (237 lb 12.8 oz)   Ideal body weight: 71.4 kg (157 lb 5.5 oz)  Adjusted ideal body weight: 85.5 kg (188 lb 7.1 oz)  % IBW:  49% over IBW  There is no height or weight on file to calculate BMI.    Primary BMT Physician: Dr. 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)      Low serum IgG for age      Non Hodgkin's lymphoma (H)      Non Hodgkin's lymphoma (H)      Prostate CA (H)      Shortness of breath      Sleep apnea     CPAP     Medication Allergies:  Allergies   Allergen Reactions     Rituxan [Rituximab] Shortness Of Breath     Wife states \"seizure-like symptoms\"     Ondansetron Itching     Current Medications (pre-admit):  Current Outpatient Medications   Medication Sig Dispense Refill     acyclovir (ZOVIRAX) 800 MG tablet Take 1 tablet (800 mg) by mouth every 12 hours 60 tablet 0     albuterol (VENTOLIN HFA) 108 (90 Base) MCG/ACT inhaler INHALE 2 PUFFS BY MOUTH FOUR TIMES DAILY AS NEEDED FOR COUGH OR CONGESTION       beclomethasone HFA (QVAR REDIHALER) 80 MCG/ACT inhaler Inhale 1 puff into the lungs daily as needed (for reactive airway symptoms)        benzonatate (TESSALON) 100 MG capsule Take 1 capsule (100 mg) by mouth 3 times daily as needed for cough 30 capsule 0     Calcium Polycarbophil (FIBER-CAPS PO) Take 2 capsules by mouth daily       guaiFENesin-codeine (ROBITUSSIN AC) 100-10 MG/5ML solution Take 5-10 mLs by mouth every 4 hours as needed for cough 180 mL 0     LORazepam (ATIVAN) 0.5 MG tablet Take 1 tablet (0.5 mg) by mouth every 4 hours as needed (Anxiety, Nausea/Vomiting or Sleep) 30 tablet 5     medical cannabis (Patient's own supply) 1 Dose daily as needed (The purpose of this order is to document that the patient reports taking medical cannabis)       omeprazole (PRILOSEC) 20 MG DR capsule TAKE 2 CAPSULES(40 MG) BY MOUTH TWICE DAILY BEFORE MEALS (Patient taking differently: Take 40 mg by mouth daily ) 60 capsule 3     prochlorperazine (COMPAZINE) 10 MG tablet Take 1 tablet (10 mg) by mouth every 6 hours as needed (Nausea/Vomiting) 30 tablet 5     sulfamethoxazole-trimethoprim (BACTRIM DS) 800-160 MG tablet " Take 1 tablet twice daily by mouth on Mon and Tue every week 16 tablet 4     tolterodine ER (DETROL LA) 4 MG 24 hr capsule Take 1 capsule (4 mg) by mouth daily 90 capsule 11     Herbal Medication/Nutritional Supplements:  See med list    Smoking/Past Drug Use:  On Medical Cannabis from [place] [facility]    Nausea/Vomiting, Pain, or other issues:  Medical Cannabis helps with nausea.    Summary:  I met with Juvenal Blake for approximately 30 minutes.  We discussed current medications, allergies, LD chemotherapy, side effects, risk of infection, use of antimicrobials, CRS, neurotoxicity, and expectations post transplant.  Juvenal will not need a pill box as he has one from past treatments.    Thank you,  Andy J. Kurtzweil, PharmD

## 2021-12-30 NOTE — PROGRESS NOTES
"Pharmacy Assessment - Pre-Stem Cell Transplant    Assessments & Recommendations:  1) When LD chemo starts, pharmacy is then to ensure 2 tocilizumab doses are available in the hospital for this patient in case of CRS.  2) Avoid Ondansetron due to severe itching.  May use Kytril (granisetron) to prevent or treat N/V.    If this patient is admitted under observation, the patient may bring in their own supply of the following medication for use in the hospital:  1) Albuterol Inhaler  2) Beclomethasone Inhaler  3) Medical Cannabis  All other meds will be supplied by pharmacy  -Per \"Medications Not Supplied by Pharmacy\" policy (available on [employer])    History of Present Illness:  Juvenal Blake is a 56 year old year old male diagnosed with Follicular Lymphoma.  He has been treated with BR, OCHOP, NAM NK, , idelalisib, and Mike/BR.  He is now being work up for Yescarta CAR-T on protocol 2017-45, which utilizes Flu/Cy as a lympho depleting chemo regimen.    Pertinent labs/tests:  Viral Testing:  CMV(+) / HSV(+) / EBV(+)  Ejection Fraction: 50-55% (12/3)  QTc: 418 msec (12/3)    Weights:   Wt Readings from Last 3 Encounters:   12/29/21 106.6 kg (235 lb 1.6 oz)   12/23/21 106.1 kg (233 lb 12.8 oz)   12/21/21 107.9 kg (237 lb 12.8 oz)   Ideal body weight: 71.4 kg (157 lb 5.5 oz)  Adjusted ideal body weight: 85.5 kg (188 lb 7.1 oz)  % IBW:  49% over IBW  There is no height or weight on file to calculate BMI.    Primary BMT Physician: Dr. 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)      Low serum IgG for age      Non Hodgkin's lymphoma (H)      Non Hodgkin's lymphoma (H)      Prostate CA (H)      Shortness " "of breath      Sleep apnea     CPAP     Medication Allergies:  Allergies   Allergen Reactions     Rituxan [Rituximab] Shortness Of Breath     Wife states \"seizure-like symptoms\"     Ondansetron Itching     Current Medications (pre-admit):  Current Outpatient Medications   Medication Sig Dispense Refill     acyclovir (ZOVIRAX) 800 MG tablet Take 1 tablet (800 mg) by mouth every 12 hours 60 tablet 0     albuterol (VENTOLIN HFA) 108 (90 Base) MCG/ACT inhaler INHALE 2 PUFFS BY MOUTH FOUR TIMES DAILY AS NEEDED FOR COUGH OR CONGESTION       beclomethasone HFA (QVAR REDIHALER) 80 MCG/ACT inhaler Inhale 1 puff into the lungs daily as needed (for reactive airway symptoms)        benzonatate (TESSALON) 100 MG capsule Take 1 capsule (100 mg) by mouth 3 times daily as needed for cough 30 capsule 0     Calcium Polycarbophil (FIBER-CAPS PO) Take 2 capsules by mouth daily       guaiFENesin-codeine (ROBITUSSIN AC) 100-10 MG/5ML solution Take 5-10 mLs by mouth every 4 hours as needed for cough 180 mL 0     LORazepam (ATIVAN) 0.5 MG tablet Take 1 tablet (0.5 mg) by mouth every 4 hours as needed (Anxiety, Nausea/Vomiting or Sleep) 30 tablet 5     medical cannabis (Patient's own supply) 1 Dose daily as needed (The purpose of this order is to document that the patient reports taking medical cannabis)       omeprazole (PRILOSEC) 20 MG DR capsule TAKE 2 CAPSULES(40 MG) BY MOUTH TWICE DAILY BEFORE MEALS (Patient taking differently: Take 40 mg by mouth daily ) 60 capsule 3     prochlorperazine (COMPAZINE) 10 MG tablet Take 1 tablet (10 mg) by mouth every 6 hours as needed (Nausea/Vomiting) 30 tablet 5     sulfamethoxazole-trimethoprim (BACTRIM DS) 800-160 MG tablet Take 1 tablet twice daily by mouth on Mon and Tue every week 16 tablet 4     tolterodine ER (DETROL LA) 4 MG 24 hr capsule Take 1 capsule (4 mg) by mouth daily 90 capsule 11     Herbal Medication/Nutritional Supplements:  See med list    Smoking/Past Drug Use:  On Medical " Cannabis from [place] [employer]    Nausea/Vomiting, Pain, or other issues:  Medical Cannabis helps with nausea.    Summary:  I met with Juvenal Blake for approximately 30 minutes.  We discussed current medications, allergies, LD chemotherapy, side effects, risk of infection, use of antimicrobials, CRS, neurotoxicity, and expectations post transplant.  Juvenal will not need a pill box as he has one from past treatments.    Thank you,  Andy J. Kurtzweil, PharmD

## 2021-12-30 NOTE — LETTER
12/30/2021         RE: Juvenal Blake  [address] [place] [address]        Dear Colleague,    Thank you for referring your patient, Juvenal Blake, to the [facility] BLOOD AND MARROW TRANSPLANT PROGRAM [place]. Please see a copy of my visit note below.    Blood and Marrow Transplant   Psychosocial Assessment with   Clinical     Assessment completed on 12/30/21 via phone as part of the COVID 19 protocol. Assessment of living situation, support system, financial status, functional status, coping, stressors, need for resources and social work intervention provided as needed. Information for this assessment was provided by pt's (and his wife's) report in addition to medical chart review and consultation with medical team.     Present at Assessment:   Patient: Juvenal Blake   Spouse: Nancy Blake  : CITLALLI Cunningham LICSW    Diagnosis: NHL     Date of Diagnosis: 2019    Transplant type: YESCARTA    Donor: Autologous     Physician: Rosa Terry MD    Nurse Coordinator: Arlen Lui RN    Social Workers: CITLALLI Cunningham LICSW     Permanent Address:   [address] [place] [address]    Living Situation: Pt lives in [place], [place] w/ his wife Nancy.    Local Address: Pt lives in [place], [place] which is within the required distance of the hospital. Pt does not need to relocate.     Contact Information:  Cell Phone: [phone]  Home Phone: [phone]  Pt Email: [email or web address]  Wife's Cell Phone: [phone]    Presenting Information:  Juvenal Blake is a 56 year old male diagnosed with NHL who presents for evaluation for a YESCARTA CAR-T therapy at the [facility] ([facility]). Pt was accompanied to today's visit by his wife Nancy.     Decision Making: Self    Health Care Directive: Yes. Pt has a health care directive and will bring it when they return to the BMT program.     Relationship Status: . Pt described  relationship as stable/supportive.     Special Needs: None identified at this time.     Family/Support System: Pt endorsed a strong support system including family and close friends who will be available to support pt throughout transplant process.     Family Information:   Spouse: Nancy Blake  Children: 2 biological sons (Juvenal the 2nd & Gwyn) and 2 step sons   Parents:   Siblings:  3 siblings- 1 brother (lives out of state) and 2 sisters    Caregiver: SW discussed with pt the caregiver role and expectation at length. Pt is agreeable to having a full time caregiver for a minimum of 30 days until cleared by the BMT physician. Pt and pt's spouse confirmed understanding of the caregiver requirement. Pt's primary caregiver will be his wife Nancy with their children as a secondary or back-up to assist as needed. Caregiver education and resources provided. No caregiver concerns identified.     Caregiver Contact Information:  Nancy Blake (wife) - Cell Phone: [phone]    Transportation Mode: Personal Vehicle. Pt is aware of driving restrictions post-BMT and the need for the caregiver is to drive until cleared to drive by the BMT physician. SW provided information on parking info and monthly parking pass options.     Insurance: Pt has [employer] Open Access health insurance. Pt denied specific insurance concerns at this time. SW reiterated information about the [employer]  should specific insurance questions arise as pt moves through transplant process. Future Insurance questions referred to [employer] - Yesenia Olivas - # [phone].    Sources of Income: Pt is supported by SSDI and his wife's employment income. Pt denied anticipation of financial hardship related to BMT at this time. SW provided information on ryan options and encouraged pt to contact this SW for support should financial situation change.     Employment:   Currently employed: No; Pt has not worked since  "last year due to treatment and side effects. Pt states he is currently receiving SSDI and his pension.  Employer: [employer]  Occupation:      Spouse/Partner Employed: Yes - has summers off; last day of school 6/11/21.  Employer: [employer]  Occupation: Teacher    Mental Health: Pt reported a hx of Anxiety. Pt states he has PRN anxiety medication at home that he does not wish to utilize often. Pt states he has noticed an increase in anxiety and feelings of letting his family down due to relapsed disease. Pt states he would be interested in possibly starting an antidepressant and having a conversation surrounding this w/ the BMT physician. SW notified BMT team and DOM scheduled for closing on 12/30/21 who will f/u w/ Pt. SW explained that it's not uncommon for patients going through the cellular therapy process to experience symptoms of depression/anxiety. Pt believes he would be able to identify symptoms of depression/anxiety throughout the this process.     PHQ-9:  Pt scored a 3 which indicates \"none\" on the depression severity scale.     GAD7:  Pt scored a 4 which indicates no sign of anxiety on the Generalized Anxiety Disorder Questionnaire.     Chemical Use:   Chemical use identified. Juvenal notes that he drinks alcohol daily- (3-4 mixed drinks) and uses marijuana daily. Juvenal notes that the marijuana use is to help with his nausea. Discussed the need to abstain from these during the BMT process. Pt states he is agreeable and denies concern as he has communicated w/ BMT regarding this.     Trauma/Loss/Abuse History: Multiple losses associated with cancer diagnosis and treatment, including health, employment, changes to physical appearance, etc.     Spirituality:Patient does not identify with martha community.  Wife is Catholic and at times attend with her, but was raised Anglican.     Coping: Pt noted that he is currently feeling \"nervous\". Pt states \"I'm hoping it actually " "works for more than a few months.\" Pt shared that his main coping mechanism is distracting himself w/ playing games or watching TV. Pt states it also helps to talk to his wife. Pt also shared that he enjoys gardening, riding his motorcycle, going on walks, watching movies, and working on a scanning/uploading photos project. SW and pt discussed additional positive coping mechanisms that pt can utilize while in the hospital. While hospitalized, pt plans to watch movies.     Caregiver Coping: Pt's wife noted that she is doing well and denies questions or concerns. She processed the difficulty of hearing her spouse endorse feelings of guilt associated w/ relapse. She discussed how this disease is out of Pt's control. SW provided empathetic listening, validation, and support.    Education Provided: Transplant process expectations, Caregiver requirements, Caregiver self-care, Financial issues related to transplant, Financial resources/grants available, Common psychosocial stressors pre/post transplant, Support group(s) available, Hospital resources available, Web site information, Social Work role and Resources for children/siblings    Interventions Provided: Psychosocial Support and Education     Assessment and Recommendations for Team:  Pt is a 56 year old male diagnosed with NHL who is here undergoing preparation for a planned YESCARTA cellular infusion.    Pt is pleasant, calm and able to articulate concerns/coping mechanisms in an appropriate manner. During our meeting pt was alert, he was interactive, affect was full, he displayed appropriate memory and thought processes. Pt feels comfortable communicating with the medical team. Pt has a strong supportive network of family and friends who are involved. Pt and pt's spouse will benefit from ongoing psychosocial support in regards to coping with the adjustment to the BMT process.     Pt has a stable support system and a confirmed caregiver plan. Pt verbalizes " understanding of the cellular therapy process and wanting to proceed. SW provided contact information and encouraged pt to contact SW with questions, concerns, resources and for support.    Per this assessment, SW did not identify any barriers to this patient moving forward with transplant.    Important Information:   N/A     Follow up Planned:   Initiate financial resources - Pt was sent a [facility] application per his request.  Psychosocial support  Healthcare Directive - Pt states he will bring in a copy.    CITLALLI Cunningham, [facility]  Adult Blood & Marrow Transplant   Phone: [phone]  Pager: [phone]        Again, thank you for allowing me to participate in the care of your patient.        Sincerely,        CITLALLI Thomas

## 2021-12-30 NOTE — LETTER
Date:January 7, 2022      Provider requested that no letter be sent. Do not send.       [facility]

## 2021-12-30 NOTE — PROGRESS NOTES
Blood and Marrow Transplant   Psychosocial Assessment with   Clinical     Assessment completed on 12/30/21 via phone as part of the COVID 19 protocol. Assessment of living situation, support system, financial status, functional status, coping, stressors, need for resources and social work intervention provided as needed. Information for this assessment was provided by pt's (and his wife's) report in addition to medical chart review and consultation with medical team.     Present at Assessment:   Patient: Juvenal Blake   Spouse: Nancy Blake  : CITLALLI Cunningham LICSW    Diagnosis: NHL     Date of Diagnosis: 2019    Transplant type: YESCARTA    Donor: Autologous     Physician: Rosa Terry MD    Nurse Coordinator: Arlen Lui RN    Social Workers: CITLALLI Cunningham LICSW     Permanent Address:   [address] [place] [address]    Living Situation: Pt lives in [place], [place] w/ his wife Nancy.    Local Address: Pt lives in [place], [place] which is within the required distance of the hospital. Pt does not need to relocate.     Contact Information:  Cell Phone: [phone]  Home Phone: [phone]  Pt Email: [email or web address]  Wife's Cell Phone: [phone]    Presenting Information:  Juvenal Blake is a 56 year old male diagnosed with NHL who presents for evaluation for a YESCARTA CAR-T therapy at the [facility] ([facility] Pt was accompanied to today's visit by his wife Nancy.     Decision Making: Self    Health Care Directive: Yes. Pt has a health care directive and will bring it when they return to the BMT program.     Relationship Status: . Pt described relationship as stable/supportive.     Special Needs: None identified at this time.     Family/Support System: Pt endorsed a strong support system including family and close friends who will be available to support pt throughout transplant process.     Family Information:   Spouse: Nancy  Hayden  Children: 2 biological sons (Juvenal the 2nd & Gwyn) and 2 step sons   Parents:   Siblings:  3 siblings- 1 brother (lives out of state) and 2 sisters    Caregiver: BERNIE discussed with pt the caregiver role and expectation at length. Pt is agreeable to having a full time caregiver for a minimum of 30 days until cleared by the BMT physician. Pt and pt's spouse confirmed understanding of the caregiver requirement. Pt's primary caregiver will be his wife Nancy with their children as a secondary or back-up to assist as needed. Caregiver education and resources provided. No caregiver concerns identified.     Caregiver Contact Information:  Nancy Blake (wife) - Cell Phone: [phone]    Transportation Mode: Personal Vehicle. Pt is aware of driving restrictions post-BMT and the need for the caregiver is to drive until cleared to drive by the BMT physician. SW provided information on parking info and monthly parking pass options.     Insurance: Pt has [employer] Open Access health insurance. Pt denied specific insurance concerns at this time. SW reiterated information about the [employer]  should specific insurance questions arise as pt moves through transplant process. Future Insurance questions referred to [employer] - Yesenia Olivas - # [phone].    Sources of Income: Pt is supported by SSDI and his wife's employment income. Pt denied anticipation of financial hardship related to BMT at this time. SW provided information on ryan options and encouraged pt to contact this  for support should financial situation change.     Employment:   Currently employed: No; Pt has not worked since last year due to treatment and side effects. Pt states he is currently receiving SSDI and his pension.  Employer: [employer]  Occupation:      Spouse/Partner Employed: Yes - has summers off; last day of school 21.  Employer: [employer]  Occupation: Teacher    Mental Health: Pt reported a hx of Anxiety. Pt states he has PRN anxiety medication at home that he does not wish to utilize often. Pt states he has noticed an increase in anxiety and feelings of letting his family down due to relapsed disease. Pt states he would be interested in possibly starting an antidepressant and having a conversation surrounding this w/ the BMT physician. SW notified BMT team and DOM scheduled for closing on 12/30/21 who will f/u w/ Pt. SW explained that it's not uncommon for patients going through the cellular therapy process to experience symptoms of depression/anxiety. Pt believes he would be able to identify symptoms of depression/anxiety throughout the this process.     PHQ-9:  Pt scored a 3 which indicates \"none\" on the depression severity scale.     GAD7:  Pt scored a 4 which indicates no sign of anxiety on the Generalized Anxiety Disorder Questionnaire.     Chemical Use:   Chemical use identified. Juvenal notes that he drinks alcohol daily- (3-4 mixed drinks) and uses marijuana daily. Juvenal notes that the marijuana use is to help with his nausea. Discussed the need to abstain from these during the BMT process. Pt states he is agreeable and denies concern as he has communicated w/ BMT regarding this.     Trauma/Loss/Abuse History: Multiple losses associated with cancer diagnosis and treatment, including health, employment, changes to physical appearance, etc.     Spirituality:Patient does not identify with martha community.  Wife is Adventism and at times attend with her, but was raised Episcopalian.     Coping: Pt noted that he is currently feeling \"nervous\". Pt states \"I'm hoping it actually works for more than a few months.\" Pt shared that his main coping mechanism is distracting himself w/ playing games or watching TV. Pt states it also helps to talk to his wife. Pt also shared that he enjoys gardening, riding his motorcycle, going on walks, watching " movies, and working on a scanning/uploading photos project. SW and pt discussed additional positive coping mechanisms that pt can utilize while in the hospital. While hospitalized, pt plans to watch movies.     Caregiver Coping: Pt's wife noted that she is doing well and denies questions or concerns. She processed the difficulty of hearing her spouse endorse feelings of guilt associated w/ relapse. She discussed how this disease is out of Pt's control. SW provided empathetic listening, validation, and support.    Education Provided: Transplant process expectations, Caregiver requirements, Caregiver self-care, Financial issues related to transplant, Financial resources/grants available, Common psychosocial stressors pre/post transplant, Support group(s) available, Hospital resources available, Web site information, Social Work role and Resources for children/siblings    Interventions Provided: Psychosocial Support and Education     Assessment and Recommendations for Team:  Pt is a 56 year old male diagnosed with NHL who is here undergoing preparation for a planned YESCARTA cellular infusion.    Pt is pleasant, calm and able to articulate concerns/coping mechanisms in an appropriate manner. During our meeting pt was alert, he was interactive, affect was full, he displayed appropriate memory and thought processes. Pt feels comfortable communicating with the medical team. Pt has a strong supportive network of family and friends who are involved. Pt and pt's spouse will benefit from ongoing psychosocial support in regards to coping with the adjustment to the BMT process.     Pt has a stable support system and a confirmed caregiver plan. Pt verbalizes understanding of the cellular therapy process and wanting to proceed. SW provided contact information and encouraged pt to contact SW with questions, concerns, resources and for support.    Per this assessment, SW did not identify any barriers to this patient moving forward  with transplant.    Important Information:   N/A     Follow up Planned:   Initiate financial resources - Pt was sent a [facility] application per his request.  Psychosocial support  Healthcare Directive - Pt states he will bring in a copy.    CITLALLI Cunningham, [facility]  Adult Blood & Marrow Transplant   Phone: [phone]  Pager: [phone]

## 2021-12-31 ASSESSMENT — ANXIETY QUESTIONNAIRES: GAD7 TOTAL SCORE: 4

## 2022-01-03 ENCOUNTER — HOSPITAL ENCOUNTER (OUTPATIENT)
Dept: PET IMAGING | Facility: CLINIC | Age: 57
End: 2022-01-03
Payer: COMMERCIAL

## 2022-01-03 ENCOUNTER — HOSPITAL ENCOUNTER (OUTPATIENT)
Dept: PET IMAGING | Facility: CLINIC | Age: 57
Setting detail: NUCLEAR MEDICINE
End: 2022-01-03
Payer: COMMERCIAL

## 2022-01-03 DIAGNOSIS — C85.90 NH LYMPHOMA (H): ICD-10-CM

## 2022-01-03 DIAGNOSIS — C82.90 FOLLICULAR LYMPHOMA (H): ICD-10-CM

## 2022-01-03 DIAGNOSIS — Z86.2 PERSONAL HISTORY OF DISEASES OF BLOOD AND BLOOD-FORMING ORGANS: ICD-10-CM

## 2022-01-03 PROCEDURE — 74177 CT ABD & PELVIS W/CONTRAST: CPT | Mod: 59,PS

## 2022-01-03 PROCEDURE — 70491 CT SOFT TISSUE NECK W/DYE: CPT | Mod: 26 | Performed by: RADIOLOGY

## 2022-01-03 PROCEDURE — 343N000001 HC RX 343

## 2022-01-03 PROCEDURE — 74177 CT ABD & PELVIS W/CONTRAST: CPT | Mod: 26 | Performed by: RADIOLOGY

## 2022-01-03 PROCEDURE — 70491 CT SOFT TISSUE NECK W/DYE: CPT

## 2022-01-03 PROCEDURE — 71260 CT THORAX DX C+: CPT | Mod: 26 | Performed by: RADIOLOGY

## 2022-01-03 PROCEDURE — 250N000011 HC RX IP 250 OP 636

## 2022-01-03 PROCEDURE — 78816 PET IMAGE W/CT FULL BODY: CPT | Mod: 26 | Performed by: RADIOLOGY

## 2022-01-03 PROCEDURE — A9552 F18 FDG: HCPCS

## 2022-01-03 RX ORDER — IOPAMIDOL 755 MG/ML
10-135 INJECTION, SOLUTION INTRAVASCULAR ONCE
Status: COMPLETED | OUTPATIENT
Start: 2022-01-03 | End: 2022-01-03

## 2022-01-03 RX ADMIN — FLUDEOXYGLUCOSE F-18 13.96 MCI.: 500 INJECTION, SOLUTION INTRAVENOUS at 09:55

## 2022-01-03 RX ADMIN — IOPAMIDOL 135 ML: 755 INJECTION, SOLUTION INTRAVENOUS at 10:50

## 2022-01-03 NOTE — PROGRESS NOTES
[facility]  BMTCT OPEN VISIT    January 4, 2022      Juvenal Blake is a 56 year old male undergoing evaluation prior to hematopoietic cell transplant or immune effector cell therapy.    Reason for BMTCT: Juvenal Blake is a 56 year old year old male diagnosed with Follicular Lymphoma.  He has been treated with BR, OCHOP, NAM NK, , idelalisib, and Mike/BR.  He is now being work up for Yescarta CAR-T on protocol 2017-45, which utilizes Flu/Cy as a lympho depleting chemo regimen.    Recent chemotherapy: polamist    Recent infections: cough/cold symptoms for last several weeks CXR negative.  - 12/23 RVP shows rhinovirus +. Was treated with leva prior to this resulting (12/21-12/27). 12/29 ongoing cough. Rule out other infectious etiologies: strep pneumo and legionella antigen in urine, fungitell and galacto and crypto = pending. Has PET on 1/3    Blood thinner use? If yes, why? No    Treatment for diabetes? No          Today, the patient notes the following symptoms:  Review Of Systems  Skin: negative  Eyes: negative  Ears/Nose/Throat: negative  Respiratory: +cough and +SOB with exertion  Cardiovascular: negative  Gastrointestinal: positive for nausea and abdominal tenderness  Genitourinary: difficulty emptying bladder completely  Musculoskeletal: negative  Neurologic: negative  Psychiatric: negative  Hematologic/Lymphatic/Immunologic: swollen nodes  Endocrine: negative      Juvenal Blake's History    Past Medical History:   Diagnosis Date     Arthritis     shoulder per H & P     Cancer (H)      GERD (gastroesophageal reflux disease)      H/O pyloric stenosis     as an infant per H & P      Inguinal hernia     per H & P      Lazy eye     per H & P      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)      Low serum IgG for age      Non Hodgkin's lymphoma (H)      Non Hodgkin's lymphoma (H)      Prostate CA (H)      Shortness of breath      Sleep apnea     CPAP       Past Surgical History:   Procedure Laterality  Date     ABDOMEN SURGERY      for pyloric stenosis as an infant     COLONOSCOPY       DISTAL CLAVICLE EXCISION      per H & P      HERNIA REPAIR      inguinal     HERNIA REPAIR, UMBILICAL       INSERT PICC LINE N/A 8/24/2021    Procedure: INSERTION, PICC EXCHANGE, PREVIOUS PICC IS OUT 10 CENTIMETERS;  Surgeon: Christiano Murdock MD;  [place] [facility] [place]     IR CHEST PORT PLACEMENT > 5 YRS OF AGE  02/17/2017     IR LYMPH NODE BIOPSY  10/01/2020     IR LYMPH NODE BIOPSY  05/28/2021     IR LYMPH NODE BIOPSY  12/20/2021     IR PICC PLACEMENT > 5 YRS OF AGE  8/24/2021     LAPAROSCOPIC HERNIORRHAPHY INCISIONAL Right 5/28/2020    Procedure: HERNIORRHAPHY, UMBILICAL, LAPAROSCOPIC; AXILLARY LYMPH NODE BIOPSY;  Surgeon: Rob Farrell MD;  Location: [facility] [place];  Service: General     OTHER SURGICAL HISTORY Left     shoulder arthroscopy     OTHER SURGICAL HISTORY  01/2017    port a cath placement     PICC DOUBLE LUMEN PLACEMENT Right 06/27/2021    47cm (3cm external), Basilic vein     UT LAP,PROSTATECTOMY,RADICAL,W/NERVE SPARE,INCL ROBOTIC N/A 11/15/2017    Procedure: ROBOTIC ASSISTED RADICAL RETROPUBIC PROSTATECTOMY BILATERAL PELVIC LYMPH NODE DISSECTION ;  Surgeon: Ezio Steele MD;  Location: [facility] [place];  Service: Urology     TONSILLECTOMY       US LYMPH NODE BIOPSY  1/7/2019       Family History   Problem Relation Age of Onset     Colon Cancer Mother         diagnosed in her late 40s     Lymphoma Father         non-Hodgkins lymphoma, diagnosed 70s     No Known Problems Brother      No Known Problems Sister      No Known Problems Son      No Known Problems Sister      No Known Problems Son      No Known Problems Sister      No Known Problems Brother      No Known Problems Son      No Known Problems Sister      No Known Problems Son      Lung Cancer Maternal Grandmother      Lung Cancer Maternal Uncle        Social History     Tobacco Use     Smoking status: Former Smoker     Quit date: 6/18/1995  "    Years since quittin.5     Smokeless tobacco: Never Used   Substance Use Topics     Alcohol use: Yes   a few alcoholic beverages every other day        Juvenal MCGRAW Gabrielchristiano's Medications and Allergies    Current Outpatient Medications   Medication     acyclovir (ZOVIRAX) 800 MG tablet     albuterol (VENTOLIN HFA) 108 (90 Base) MCG/ACT inhaler     beclomethasone HFA (QVAR REDIHALER) 80 MCG/ACT inhaler     benzonatate (TESSALON) 100 MG capsule     Calcium Polycarbophil (FIBER-CAPS PO)     guaiFENesin-codeine (ROBITUSSIN AC) 100-10 MG/5ML solution     LORazepam (ATIVAN) 0.5 MG tablet     medical cannabis (Patient's own supply)     omeprazole (PRILOSEC) 20 MG DR capsule     prochlorperazine (COMPAZINE) 10 MG tablet     sulfamethoxazole-trimethoprim (BACTRIM DS) 800-160 MG tablet     tolterodine ER (DETROL LA) 4 MG 24 hr capsule     No current facility-administered medications for this visit.          Allergies   Allergen Reactions     Rituxan [Rituximab] Shortness Of Breath     Wife states \"seizure-like symptoms\"     Ondansetron Itching           Physical Examination    There were no vitals taken for this visit.    Exam:  Constitutional: healthy, alert and no distress  Head: negative  ENT: throat normal; upper bridge  Cardiovascular: negative  Respiratory: negative  Gastrointestinal: positive findings: obese, tender to palpation  : Deferred  Musculoskeletal: extremities normal; + LE edema  Skin: no suspicious lesions or rashes  Neurologic: nonfocal  Psychiatric: mentation appears normal and affect normal/bright        Frailty Screening    1. Weight loss: Have you lost >10 pounds (or >5% body weight) unintentionally over the last year? Yes      2. Exhaustion: How often in the past week did you feel that:  o I feel that everything I do is an effort : .Exhaustion: 0 = rarely or none of the time (<1 day)  o I feel I cannot get going: Exhaustion: 0 = rarely or none of the time (<1 day)    3. Weakness:  Hand  " strength (measured by MA; calculate average): 30.66     Male BMI Frailty Criteria for  Male  Strength Female BMI Frailty Criteria for  Female  Strength   ?24 ?29 ?23 ?17   24.1 - 26 ?30 23.1 - 26 ?17.3   26.1 - 28 ?30 26.1 - 29 ?18   >28 ?32 >29 ?21     4. Slowness:  15 foot walk time (measured by MA):  6    Height Frailty Criteria for  15 Foot Walk Time   Men   ?173 cm ? 7 seconds    >173 cm  ? 6 seconds   Women   ?159 cm ? 7 seconds   >159 cm  ? 6 seconds     5. Physical activity:     *Please complete 2 calculations for kcal (see frailty worksheet for equations)     Energy expenditure for frailty: 852 kcal expended per week     Gender Frailty Criteria for Low Physical Activity   Male <383 kcal/week   Female <270 kcal/weel     IPAQ score: 480 MET minutes per week       Juvenal Blake met the following criteria for prefrailty (score 1-2) or frailty (score 3+): Frailty: Weight Loss and Slowness  Frailty Score is: 2      Additional assessments not to be used in frailty calculation:   What types of physical activity can you tolerate?     Sit to stand test (time to complete 5 reps): 22 seconds    Standing balance in 10 seconds:  Record the Total number of seconds(0-30)--add a+b+c ( take best attempt for each)    First attempt: 30 seconds               Overall Assessment         I have reviewed the diagnostic data, medications, frailty screening, and general processes prior to BMTCT.  I have notified the Primary BMT Physician/and or Attending Physician in the clinic of any issues. We also discussed in detail the database and biorepository research for which Juvenal Blake is eligible. We discussed the potential risks and potential benefits of each of these protocols individually. We explained potential alternatives to the protocols discussed. We explained to the patient that participation is voluntary and that consent may be withdrawn at any time.       Consents Signed:    Blood transfusion consent  form    Ethnicity form    CIBMTR database    [facility] BMTCT Database    Present during the discussion was pt. Copies of the signed consent forms will be provided to the patient on admission. No procedures specific to any studies were performed prior to the patient signing the consent form.    Juvenal Blake had the opportunity to ask questions, and I answered all of the questions to the best of my ability.  I spent 50 minutes in the care of this patient today, which included time necessary for preparation for the visit, obtaining history, ordering medications/tests/procedures as medically indicated, review of pertinent medical literature, counseling of the patient, communication of recommendations to the care team, and documentation time.    Kelly Graham NP

## 2022-01-03 NOTE — PROGRESS NOTES
"[facility] BMT [facility]    HPI and Interval History: 57 yo M, r/r FL, here to close work up for CAR-T (YESCARTA). Most recent relapse (FL) confirmed on  on biopsy of a node. He has been treated so far with BR, OCHOP, NAM NK, , idelalisib, and Mike. Recent URI which has improved a lot but not fully resolved, no fevers, 10-point ROS otherwise negative.      Current Outpatient Medications   Medication     acyclovir (ZOVIRAX) 800 MG tablet     albuterol (VENTOLIN HFA) 108 (90 Base) MCG/ACT inhaler     beclomethasone HFA (QVAR REDIHALER) 80 MCG/ACT inhaler     benzonatate (TESSALON) 100 MG capsule     Calcium Polycarbophil (FIBER-CAPS PO)     guaiFENesin-codeine (ROBITUSSIN AC) 100-10 MG/5ML solution     LORazepam (ATIVAN) 0.5 MG tablet     medical cannabis (Patient's own supply)     omeprazole (PRILOSEC) 20 MG DR capsule     prochlorperazine (COMPAZINE) 10 MG tablet     sulfamethoxazole-trimethoprim (BACTRIM DS) 800-160 MG tablet     tolterodine ER (DETROL LA) 4 MG 24 hr capsule     No current facility-administered medications for this visit.     Ph/E:   /74 (BP Location: Right arm, Patient Position: Sitting, Cuff Size: Adult Regular)   Pulse 65   Temp 98.6  F (37  C) (Oral)   Resp 16   Ht 1.753 m (5' 9.02\")   Wt 108.6 kg (239 lb 6.4 oz)   SpO2 99%   BMI 35.34 kg/m    General: NAD; H&N: no mucosal lesions; Lungs clear; Heart RRR; Abdomen; Soft, No organomegaly; Extremities: symmetric 1+ pitting edema b/l LEs; Skin: No rash; Neuro: Nonfocal; Mood/Affect: appropriate; LAP: 2x1 cm LN in right femoral. ECO    A&P:   1. R/r FL: Ready for Yescarta. Mon repeat RVP and if neg, move on to LD chemo. He knows about goals and SEs very well, including CRS, neurotoxicity, cytopenias, infection, bleeding, other organ toxicity, and death. No clinical suspicion for CNS disease.   2. ID: acyclovir, bactrim. S/p covid vaccines and flu shot. Recent parainfluenza URI. Much improved.   3. Hx prostate " cancer: s/p surgery and RT.       BMT and Cell Therapy Informed Consent Discussion     In today's visit, we discussed in detail the research for which Juvenal Blake is eligible. We discussed the potential risks and potential benefits of each protocol individually. We explained potential alternatives to the protocols discussed. We explained to the patient that participation is voluntary and that consent may be withdrawn at any time.     We discussed:    The rationale for our approach to the disease treatment    The eligibility requirements for treatment in the context of clinical trials    The need for caregiver support and the caregiver's role in recovery    The importance of adherence to the treatment plan and appropriate follow up    The requirements for contraception while undergoing treatment    The potential risks of morbidity and mortality related to this treatment    The requirements for supportive care to reduce the risk of infection and other complications    The role of the dietician and PT/OT to reduce the risk of muscle loss/sarcopenia    Support that is available through our social workers and care team to mitigate distress    The desired outcomes/goals of treatment, including the possibility of long-term disease control    The patient completed the last round of treatment on 12/21/21.    HCT-CI score: 6 (solit tumor, obesity, FEV1). We counseled the patient about the impact of this on the risk of treatment related and overall mortality. The score fit within treatment protocol eligibility criteria.    Karnofsky performance score: 90%       ECOG: (required for CAR-T): 0    Active infections:  none.  Prior infections that require additional special prophylaxis considerations: none.  I reviewed and discussed infectious disease evaluation with the patient and the management plan during treatment.    Reproductive status: What methods of birth control does the patient plan to use during the treatment period  beginning with conditioning and ending with the discontinuation of immune suppression (indicate with an X all that apply):  __ The patient is confirmed to be sterile or post-menopausal  __ Sexual abstinence  _x_ Condoms  __ Implants  __ Injectables  __ Oral contraceptives  __ Intrauterine devices (IUD)  __ Other (describe)    The patient received appropriate reproductive counseling and agreed with the need for effective contraception during the treatment procedures.      Dental health suitable to proceed: Yes    After our detailed discussion above, the patient signed the following consents for treatment and protocols:  Yescarta CAR-T on protocol 2017-45

## 2022-01-04 ENCOUNTER — ONCOLOGY VISIT (OUTPATIENT)
Dept: TRANSPLANT | Facility: CLINIC | Age: 57
End: 2022-01-04
Payer: COMMERCIAL

## 2022-01-04 ENCOUNTER — OFFICE VISIT (OUTPATIENT)
Dept: TRANSPLANT | Facility: CLINIC | Age: 57
End: 2022-01-04
Payer: COMMERCIAL

## 2022-01-04 ENCOUNTER — MEDICAL CORRESPONDENCE (OUTPATIENT)
Dept: TRANSPLANT | Facility: CLINIC | Age: 57
End: 2022-01-04

## 2022-01-04 VITALS
HEIGHT: 69 IN | RESPIRATION RATE: 16 BRPM | DIASTOLIC BLOOD PRESSURE: 74 MMHG | OXYGEN SATURATION: 99 % | HEART RATE: 65 BPM | BODY MASS INDEX: 35.46 KG/M2 | WEIGHT: 239.4 LBS | TEMPERATURE: 98.6 F | SYSTOLIC BLOOD PRESSURE: 128 MMHG

## 2022-01-04 DIAGNOSIS — C82.08 FOLLICULAR LYMPHOMA GRADE I, LYMPH NODES OF MULTIPLE SITES (H): ICD-10-CM

## 2022-01-04 DIAGNOSIS — Z86.2 PERSONAL HISTORY OF DISEASES OF BLOOD AND BLOOD-FORMING ORGANS: ICD-10-CM

## 2022-01-04 DIAGNOSIS — C82.00 FOLLICULAR LYMPHOMA GRADE I, UNSPECIFIED BODY REGION (H): Primary | ICD-10-CM

## 2022-01-04 PROCEDURE — G0463 HOSPITAL OUTPT CLINIC VISIT: HCPCS

## 2022-01-04 PROCEDURE — 99207 PR NO BILLABLE SERVICE THIS VISIT: CPT

## 2022-01-04 PROCEDURE — 99215 OFFICE O/P EST HI 40 MIN: CPT

## 2022-01-04 ASSESSMENT — MIFFLIN-ST. JEOR: SCORE: 1896.54

## 2022-01-04 ASSESSMENT — PAIN SCALES - GENERAL: PAINLEVEL: NO PAIN (1)

## 2022-01-04 NOTE — NURSING NOTE
"Oncology Rooming Note    January 4, 2022 2:41 PM   Juvenal Blake is a 56 year old male who presents for:    Chief Complaint   Patient presents with     Oncology Clinic Visit     Initial Vitals: /74 (BP Location: Right arm, Patient Position: Sitting, Cuff Size: Adult Regular)   Pulse 65   Temp 98.6  F (37  C) (Oral)   Resp 16   Wt 108.6 kg (239 lb 6.4 oz)   SpO2 99%   BMI 35.35 kg/m   Estimated body mass index is 35.35 kg/m  as calculated from the following:    Height as of 12/20/21: 1.753 m (5' 9\").    Weight as of this encounter: 108.6 kg (239 lb 6.4 oz). Body surface area is 2.3 meters squared.  No Pain (1) Comment: Data Unavailable   No LMP for male patient.  Allergies reviewed: Yes  Medications reviewed: Yes    Medications: Medication refills not needed today.  Pharmacy name entered into [employer]: [id number] - [place], [place] - [address] AT [facility]    Clinical concerns: None.        Carmela Montano CMA            "

## 2022-01-04 NOTE — LETTER
1/4/2022         RE: Juvenal Blake  [address] [place] [address]        Dear Colleague,    Thank you for referring your patient, Juvenal Blake, to the [facility] BLOOD AND MARROW TRANSPLANT PROGRAM [place]. Please see a copy of my visit note below.    [facility]  BMTCT OPEN VISIT  January 4, 2022    Juvenal Blake is a 56 year old male undergoing evaluation prior to hematopoietic cell transplant or immune effector cell therapy.    Reason for BMTCT: Juvenal Blake is a 56 year old year old male diagnosed with Follicular Lymphoma.  He has been treated with BR, OCHOP, NAM NK, , idelalisib, and Mike/BR.  He is now being work up for Yescarta CAR-T on protocol 2017-45, which utilizes Flu/Cy as a lympho depleting chemo regimen.    Recent chemotherapy: polamist    Recent infections: cough/cold symptoms for last several weeks CXR negative.  - 12/23 RVP shows rhinovirus +. Was treated with leva prior to this resulting (12/21-12/27). 12/29 ongoing cough. Rule out other infectious etiologies: strep pneumo and legionella antigen in urine, fungitell and galacto and crypto = pending. Has PET on 1/3    Blood thinner use? If yes, why? No    Treatment for diabetes? No          Today, the patient notes the following symptoms:  Review Of Systems  Skin: negative  Eyes: negative  Ears/Nose/Throat: negative  Respiratory: +cough and +SOB with exertion  Cardiovascular: negative  Gastrointestinal: positive for nausea and abdominal tenderness  Genitourinary: difficulty emptying bladder completely  Musculoskeletal: negative  Neurologic: negative  Psychiatric: negative  Hematologic/Lymphatic/Immunologic: swollen nodes  Endocrine: negative      Juvenal Blake's History    Past Medical History:   Diagnosis Date     Arthritis     shoulder per H & P     Cancer (H)      GERD (gastroesophageal reflux disease)      H/O pyloric stenosis     as an infant per H & P      Inguinal hernia     per H & P      Lazy eye      per H & P      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)      Low serum IgG for age      Non Hodgkin's lymphoma (H)      Non Hodgkin's lymphoma (H)      Prostate CA (H)      Shortness of breath      Sleep apnea     CPAP       Past Surgical History:   Procedure Laterality Date     ABDOMEN SURGERY      for pyloric stenosis as an infant     COLONOSCOPY       DISTAL CLAVICLE EXCISION      per H & P      HERNIA REPAIR      inguinal     HERNIA REPAIR, UMBILICAL       INSERT PICC LINE N/A 8/24/2021    Procedure: INSERTION, PICC EXCHANGE, PREVIOUS PICC IS OUT 10 CENTIMETERS;  Surgeon: Christiano Murdock MD;  [place] [facility] [place]     IR CHEST PORT PLACEMENT > 5 YRS OF AGE  02/17/2017     IR LYMPH NODE BIOPSY  10/01/2020     IR LYMPH NODE BIOPSY  05/28/2021     IR LYMPH NODE BIOPSY  12/20/2021     IR PICC PLACEMENT > 5 YRS OF AGE  8/24/2021     LAPAROSCOPIC HERNIORRHAPHY INCISIONAL Right 5/28/2020    Procedure: HERNIORRHAPHY, UMBILICAL, LAPAROSCOPIC; AXILLARY LYMPH NODE BIOPSY;  Surgeon: Rob Farrell MD;  Location: [facility];  Service: General     OTHER SURGICAL HISTORY Left     shoulder arthroscopy     OTHER SURGICAL HISTORY  01/2017    port a cath placement     PICC DOUBLE LUMEN PLACEMENT Right 06/27/2021    47cm (3cm external), Basilic vein     AL LAP,PROSTATECTOMY,RADICAL,W/NERVE SPARE,INCL ROBOTIC N/A 11/15/2017    Procedure: ROBOTIC ASSISTED RADICAL RETROPUBIC PROSTATECTOMY BILATERAL PELVIC LYMPH NODE DISSECTION ;  Surgeon: Ezio Steele MD;  Location: [facility];  Service: Urology     TONSILLECTOMY       US LYMPH NODE BIOPSY  1/7/2019       Family History   Problem Relation Age of Onset     Colon Cancer Mother         diagnosed in her late 40s     Lymphoma Father         non-Hodgkins lymphoma, diagnosed 70s     No Known Problems Brother      No Known Problems Sister      No Known Problems Son      No Known Problems Sister      No Known Problems Son      No Known Problems Sister      No Known  "Problems Brother      No Known Problems Son      No Known Problems Sister      No Known Problems Son      Lung Cancer Maternal Grandmother      Lung Cancer Maternal Uncle        Social History     Tobacco Use     Smoking status: Former Smoker     Quit date: 1995     Years since quittin.5     Smokeless tobacco: Never Used   Substance Use Topics     Alcohol use: Yes   a few alcoholic beverages every other day        Juvenal LUCIEN Ricardo Medications and Allergies    Current Outpatient Medications   Medication     acyclovir (ZOVIRAX) 800 MG tablet     albuterol (VENTOLIN HFA) 108 (90 Base) MCG/ACT inhaler     beclomethasone HFA (QVAR REDIHALER) 80 MCG/ACT inhaler     benzonatate (TESSALON) 100 MG capsule     Calcium Polycarbophil (FIBER-CAPS PO)     guaiFENesin-codeine (ROBITUSSIN AC) 100-10 MG/5ML solution     LORazepam (ATIVAN) 0.5 MG tablet     medical cannabis (Patient's own supply)     omeprazole (PRILOSEC) 20 MG DR capsule     prochlorperazine (COMPAZINE) 10 MG tablet     sulfamethoxazole-trimethoprim (BACTRIM DS) 800-160 MG tablet     tolterodine ER (DETROL LA) 4 MG 24 hr capsule     No current facility-administered medications for this visit.          Allergies   Allergen Reactions     Rituxan [Rituximab] Shortness Of Breath     Wife states \"seizure-like symptoms\"     Ondansetron Itching           Physical Examination    There were no vitals taken for this visit.    Exam:  Constitutional: healthy, alert and no distress  Head: negative  ENT: throat normal; upper bridge  Cardiovascular: negative  Respiratory: negative  Gastrointestinal: positive findings: obese, tender to palpation  : Deferred  Musculoskeletal: extremities normal; + LE edema  Skin: no suspicious lesions or rashes  Neurologic: nonfocal  Psychiatric: mentation appears normal and affect normal/bright        Frailty Screening    1. Weight loss: Have you lost >10 pounds (or >5% body weight) unintentionally over the last year? Yes  "     2. Exhaustion: How often in the past week did you feel that:  o I feel that everything I do is an effort : .Exhaustion: 0 = rarely or none of the time (<1 day)  o I feel I cannot get going: Exhaustion: 0 = rarely or none of the time (<1 day)    3. Weakness:  Hand  strength (measured by MA; calculate average): 30.66     Male BMI Frailty Criteria for  Male  Strength Female BMI Frailty Criteria for  Female  Strength   ?24 ?29 ?23 ?17   24.1 - 26 ?30 23.1 - 26 ?17.3   26.1 - 28 ?30 26.1 - 29 ?18   >28 ?32 >29 ?21     4. Slowness:  15 foot walk time (measured by MA):  6    Height Frailty Criteria for  15 Foot Walk Time   Men   ?173 cm ? 7 seconds    >173 cm  ? 6 seconds   Women   ?159 cm ? 7 seconds   >159 cm  ? 6 seconds     5. Physical activity:     *Please complete 2 calculations for kcal (see frailty worksheet for equations)     Energy expenditure for frailty: 852 kcal expended per week     Gender Frailty Criteria for Low Physical Activity   Male <383 kcal/week   Female <270 kcal/weel     IPAQ score: 480 MET minutes per week       Juvenal Blake met the following criteria for prefrailty (score 1-2) or frailty (score 3+): Frailty: Weight Loss and Slowness  Frailty Score is: 2      Additional assessments not to be used in frailty calculation:   What types of physical activity can you tolerate?     Sit to stand test (time to complete 5 reps): 22 seconds    Standing balance in 10 seconds:  Record the Total number of seconds(0-30)--add a+b+c ( take best attempt for each)    First attempt: 30 seconds               Overall Assessment         I have reviewed the diagnostic data, medications, frailty screening, and general processes prior to BMTCT.  I have notified the Primary BMT Physician/and or Attending Physician in the clinic of any issues. We also discussed in detail the database and biorepository research for which Juvenal Blake is eligible. We discussed the potential risks and potential benefits of  each of these protocols individually. We explained potential alternatives to the protocols discussed. We explained to the patient that participation is voluntary and that consent may be withdrawn at any time.       Consents Signed:    Blood transfusion consent form    Ethnicity form    CIBMTR database    [facility] BMTCT Database    Present during the discussion was pt. Copies of the signed consent forms will be provided to the patient on admission. No procedures specific to any studies were performed prior to the patient signing the consent form.    Juvenal Blake had the opportunity to ask questions, and I answered all of the questions to the best of my ability.  I spent 50 minutes in the care of this patient today, which included time necessary for preparation for the visit, obtaining history, ordering medications/tests/procedures as medically indicated, review of pertinent medical literature, counseling of the patient, communication of recommendations to the care team, and documentation time.    Kelly Graham NP

## 2022-01-04 NOTE — LETTER
"    2022         RE: Juvenal Blake  [address] [place] [address]        Dear Colleague,    Thank you for referring your patient, Juvenal lBake, to the [facility] BLOOD AND MARROW TRANSPLANT PROGRAM [place]. Please see a copy of my visit note below.    [facility] BMT [facility]    HPI and Interval History: 55 yo M, r/r FL, here to close work up for CAR-T (YESCARTA). Most recent relapse (FL) confirmed on  on biopsy of a node. He has been treated so far with BR, OCHOP, NAM NK, , idelalisib, and Mike. Recent URI which has improved a lot but not fully resolved, no fevers, 10-point ROS otherwise negative.      Current Outpatient Medications   Medication     acyclovir (ZOVIRAX) 800 MG tablet     albuterol (VENTOLIN HFA) 108 (90 Base) MCG/ACT inhaler     beclomethasone HFA (QVAR REDIHALER) 80 MCG/ACT inhaler     benzonatate (TESSALON) 100 MG capsule     Calcium Polycarbophil (FIBER-CAPS PO)     guaiFENesin-codeine (ROBITUSSIN AC) 100-10 MG/5ML solution     LORazepam (ATIVAN) 0.5 MG tablet     medical cannabis (Patient's own supply)     omeprazole (PRILOSEC) 20 MG DR capsule     prochlorperazine (COMPAZINE) 10 MG tablet     sulfamethoxazole-trimethoprim (BACTRIM DS) 800-160 MG tablet     tolterodine ER (DETROL LA) 4 MG 24 hr capsule     No current facility-administered medications for this visit.     Ph/E:   /74 (BP Location: Right arm, Patient Position: Sitting, Cuff Size: Adult Regular)   Pulse 65   Temp 98.6  F (37  C) (Oral)   Resp 16   Ht 1.753 m (5' 9.02\")   Wt 108.6 kg (239 lb 6.4 oz)   SpO2 99%   BMI 35.34 kg/m    General: NAD; H&N: no mucosal lesions; Lungs clear; Heart RRR; Abdomen; Soft, No organomegaly; Extremities: symmetric 1+ pitting edema b/l LEs; Skin: No rash; Neuro: Nonfocal; Mood/Affect: appropriate; LAP: 2x1 cm LN in right femoral. ECO    A&P:   1. R/r FL: Ready for Yescarta. Mon repeat RVP and if neg, move on to LD chemo. He knows about " goals and SEs very well, including CRS, neurotoxicity, cytopenias, infection, bleeding, other organ toxicity, and death. No clinical suspicion for CNS disease.   2. ID: acyclovir, bactrim. S/p covid vaccines and flu shot. Recent parainfluenza URI. Much improved.   3. Hx prostate cancer: s/p surgery and RT.       BMT and Cell Therapy Informed Consent Discussion     In today's visit, we discussed in detail the research for which Juvenal Blake is eligible. We discussed the potential risks and potential benefits of each protocol individually. We explained potential alternatives to the protocols discussed. We explained to the patient that participation is voluntary and that consent may be withdrawn at any time.     We discussed:    The rationale for our approach to the disease treatment    The eligibility requirements for treatment in the context of clinical trials    The need for caregiver support and the caregiver's role in recovery    The importance of adherence to the treatment plan and appropriate follow up    The requirements for contraception while undergoing treatment    The potential risks of morbidity and mortality related to this treatment    The requirements for supportive care to reduce the risk of infection and other complications    The role of the dietician and PT/OT to reduce the risk of muscle loss/sarcopenia    Support that is available through our social workers and care team to mitigate distress    The desired outcomes/goals of treatment, including the possibility of long-term disease control    The patient completed the last round of treatment on 12/21/21.    HCT-CI score: 6 (solit tumor, obesity, FEV1). We counseled the patient about the impact of this on the risk of treatment related and overall mortality. The score fit within treatment protocol eligibility criteria.    Karnofsky performance score: 90%       ECOG: (required for CAR-T): 0    Active infections:  none.  Prior infections that  require additional special prophylaxis considerations: none.  I reviewed and discussed infectious disease evaluation with the patient and the management plan during treatment.    Reproductive status: What methods of birth control does the patient plan to use during the treatment period beginning with conditioning and ending with the discontinuation of immune suppression (indicate with an X all that apply):  __ The patient is confirmed to be sterile or post-menopausal  __ Sexual abstinence  _x_ Condoms  __ Implants  __ Injectables  __ Oral contraceptives  __ Intrauterine devices (IUD)  __ Other (describe)    The patient received appropriate reproductive counseling and agreed with the need for effective contraception during the treatment procedures.      Dental health suitable to proceed: Yes    After our detailed discussion above, the patient signed the following consents for treatment and protocols:  Yescarta CAR-T on protocol 2017-45      BMT DOM

## 2022-01-05 DIAGNOSIS — Z86.2 PERSONAL HISTORY OF DISEASES OF BLOOD AND BLOOD-FORMING ORGANS: Primary | ICD-10-CM

## 2022-01-05 RX ORDER — ALBUTEROL SULFATE 90 UG/1
2 AEROSOL, METERED RESPIRATORY (INHALATION) ONCE
Status: CANCELLED
Start: 2022-01-12 | End: 2022-01-12

## 2022-01-05 RX ORDER — NALOXONE HYDROCHLORIDE 0.4 MG/ML
0.2 INJECTION, SOLUTION INTRAMUSCULAR; INTRAVENOUS; SUBCUTANEOUS
Status: CANCELLED | OUTPATIENT
Start: 2022-01-13

## 2022-01-05 RX ORDER — HEPARIN SODIUM (PORCINE) LOCK FLUSH IV SOLN 100 UNIT/ML 100 UNIT/ML
5 SOLUTION INTRAVENOUS
Status: CANCELLED | OUTPATIENT
Start: 2022-01-14

## 2022-01-05 RX ORDER — ALBUTEROL SULFATE 90 UG/1
1-2 AEROSOL, METERED RESPIRATORY (INHALATION)
Status: CANCELLED
Start: 2022-01-14

## 2022-01-05 RX ORDER — MEPERIDINE HYDROCHLORIDE 25 MG/ML
25 INJECTION INTRAMUSCULAR; INTRAVENOUS; SUBCUTANEOUS EVERY 30 MIN PRN
Status: CANCELLED | OUTPATIENT
Start: 2022-01-12

## 2022-01-05 RX ORDER — EPINEPHRINE 1 MG/ML
0.3 INJECTION, SOLUTION INTRAMUSCULAR; SUBCUTANEOUS EVERY 5 MIN PRN
Status: CANCELLED | OUTPATIENT
Start: 2022-01-14

## 2022-01-05 RX ORDER — ALBUTEROL SULFATE 0.83 MG/ML
2.5 SOLUTION RESPIRATORY (INHALATION)
Status: CANCELLED | OUTPATIENT
Start: 2022-01-14

## 2022-01-05 RX ORDER — EPINEPHRINE 1 MG/ML
0.3 INJECTION, SOLUTION INTRAMUSCULAR; SUBCUTANEOUS EVERY 5 MIN PRN
Status: CANCELLED | OUTPATIENT
Start: 2022-01-13

## 2022-01-05 RX ORDER — HEPARIN SODIUM (PORCINE) LOCK FLUSH IV SOLN 100 UNIT/ML 100 UNIT/ML
5 SOLUTION INTRAVENOUS
Status: CANCELLED | OUTPATIENT
Start: 2022-01-12

## 2022-01-05 RX ORDER — METHYLPREDNISOLONE SODIUM SUCCINATE 125 MG/2ML
125 INJECTION, POWDER, LYOPHILIZED, FOR SOLUTION INTRAMUSCULAR; INTRAVENOUS
Status: CANCELLED
Start: 2022-01-12

## 2022-01-05 RX ORDER — SODIUM CHLORIDE 9 MG/ML
INJECTION, SOLUTION INTRAVENOUS CONTINUOUS
Status: CANCELLED | OUTPATIENT
Start: 2022-01-13

## 2022-01-05 RX ORDER — ALBUTEROL SULFATE 0.83 MG/ML
2.5 SOLUTION RESPIRATORY (INHALATION)
Status: CANCELLED | OUTPATIENT
Start: 2022-01-13

## 2022-01-05 RX ORDER — LORAZEPAM 2 MG/ML
0.5 INJECTION INTRAMUSCULAR EVERY 4 HOURS PRN
Status: CANCELLED | OUTPATIENT
Start: 2022-01-12

## 2022-01-05 RX ORDER — SODIUM CHLORIDE 9 MG/ML
INJECTION, SOLUTION INTRAVENOUS CONTINUOUS
Status: CANCELLED | OUTPATIENT
Start: 2022-01-14

## 2022-01-05 RX ORDER — MEPERIDINE HYDROCHLORIDE 25 MG/ML
25 INJECTION INTRAMUSCULAR; INTRAVENOUS; SUBCUTANEOUS EVERY 30 MIN PRN
Status: CANCELLED | OUTPATIENT
Start: 2022-01-13

## 2022-01-05 RX ORDER — PENTAMIDINE ISETHIONATE 300 MG/300MG
300 INHALANT RESPIRATORY (INHALATION) ONCE
Status: CANCELLED | OUTPATIENT
Start: 2022-01-12 | End: 2022-01-12

## 2022-01-05 RX ORDER — ALBUTEROL SULFATE 0.83 MG/ML
2.5 SOLUTION RESPIRATORY (INHALATION)
Status: CANCELLED | OUTPATIENT
Start: 2022-01-12

## 2022-01-05 RX ORDER — LORAZEPAM 2 MG/ML
0.5 INJECTION INTRAMUSCULAR EVERY 4 HOURS PRN
Status: CANCELLED | OUTPATIENT
Start: 2022-01-14

## 2022-01-05 RX ORDER — EPINEPHRINE 1 MG/ML
0.3 INJECTION, SOLUTION INTRAMUSCULAR; SUBCUTANEOUS EVERY 5 MIN PRN
Status: CANCELLED | OUTPATIENT
Start: 2022-01-12

## 2022-01-05 RX ORDER — LORAZEPAM 2 MG/ML
0.5 INJECTION INTRAMUSCULAR EVERY 4 HOURS PRN
Status: CANCELLED | OUTPATIENT
Start: 2022-01-13

## 2022-01-05 RX ORDER — ALBUTEROL SULFATE 90 UG/1
1-2 AEROSOL, METERED RESPIRATORY (INHALATION)
Status: CANCELLED
Start: 2022-01-13

## 2022-01-05 RX ORDER — DIPHENHYDRAMINE HYDROCHLORIDE 50 MG/ML
50 INJECTION INTRAMUSCULAR; INTRAVENOUS
Status: CANCELLED
Start: 2022-01-14

## 2022-01-05 RX ORDER — DIPHENHYDRAMINE HYDROCHLORIDE 50 MG/ML
50 INJECTION INTRAMUSCULAR; INTRAVENOUS
Status: CANCELLED
Start: 2022-01-13

## 2022-01-05 RX ORDER — NALOXONE HYDROCHLORIDE 0.4 MG/ML
0.2 INJECTION, SOLUTION INTRAMUSCULAR; INTRAVENOUS; SUBCUTANEOUS
Status: CANCELLED | OUTPATIENT
Start: 2022-01-14

## 2022-01-05 RX ORDER — ALBUTEROL SULFATE 90 UG/1
1-2 AEROSOL, METERED RESPIRATORY (INHALATION)
Status: CANCELLED
Start: 2022-01-12

## 2022-01-05 RX ORDER — HEPARIN SODIUM,PORCINE 10 UNIT/ML
5 VIAL (ML) INTRAVENOUS
Status: CANCELLED | OUTPATIENT
Start: 2022-01-12

## 2022-01-05 RX ORDER — SODIUM CHLORIDE 9 MG/ML
INJECTION, SOLUTION INTRAVENOUS CONTINUOUS
Status: CANCELLED | OUTPATIENT
Start: 2022-01-12

## 2022-01-05 RX ORDER — HEPARIN SODIUM,PORCINE 10 UNIT/ML
5 VIAL (ML) INTRAVENOUS
Status: CANCELLED | OUTPATIENT
Start: 2022-01-14

## 2022-01-05 RX ORDER — HEPARIN SODIUM (PORCINE) LOCK FLUSH IV SOLN 100 UNIT/ML 100 UNIT/ML
5 SOLUTION INTRAVENOUS
Status: CANCELLED | OUTPATIENT
Start: 2022-01-13

## 2022-01-05 RX ORDER — DIPHENHYDRAMINE HYDROCHLORIDE 50 MG/ML
50 INJECTION INTRAMUSCULAR; INTRAVENOUS
Status: CANCELLED
Start: 2022-01-12

## 2022-01-05 RX ORDER — HEPARIN SODIUM,PORCINE 10 UNIT/ML
5 VIAL (ML) INTRAVENOUS
Status: CANCELLED | OUTPATIENT
Start: 2022-01-13

## 2022-01-05 RX ORDER — METHYLPREDNISOLONE SODIUM SUCCINATE 125 MG/2ML
125 INJECTION, POWDER, LYOPHILIZED, FOR SOLUTION INTRAMUSCULAR; INTRAVENOUS
Status: CANCELLED
Start: 2022-01-13

## 2022-01-05 RX ORDER — MEPERIDINE HYDROCHLORIDE 25 MG/ML
25 INJECTION INTRAMUSCULAR; INTRAVENOUS; SUBCUTANEOUS EVERY 30 MIN PRN
Status: CANCELLED | OUTPATIENT
Start: 2022-01-14

## 2022-01-05 RX ORDER — NALOXONE HYDROCHLORIDE 0.4 MG/ML
0.2 INJECTION, SOLUTION INTRAMUSCULAR; INTRAVENOUS; SUBCUTANEOUS
Status: CANCELLED | OUTPATIENT
Start: 2022-01-12

## 2022-01-05 RX ORDER — METHYLPREDNISOLONE SODIUM SUCCINATE 125 MG/2ML
125 INJECTION, POWDER, LYOPHILIZED, FOR SOLUTION INTRAMUSCULAR; INTRAVENOUS
Status: CANCELLED
Start: 2022-01-14

## 2022-01-06 ENCOUNTER — LAB REQUISITION (OUTPATIENT)
Dept: LAB | Facility: CLINIC | Age: 57
End: 2022-01-06
Payer: COMMERCIAL

## 2022-01-06 ENCOUNTER — TELEPHONE (OUTPATIENT)
Dept: TRANSPLANT | Facility: CLINIC | Age: 57
End: 2022-01-06
Payer: COMMERCIAL

## 2022-01-06 DIAGNOSIS — Z03.818 ENCOUNTER FOR OBSERVATION FOR SUSPECTED EXPOSURE TO OTHER BIOLOGICAL AGENTS RULED OUT: ICD-10-CM

## 2022-01-06 PROCEDURE — U0005 INFEC AGEN DETEC AMPLI PROBE: HCPCS | Mod: ORL | Performed by: FAMILY MEDICINE

## 2022-01-06 NOTE — TELEPHONE ENCOUNTER
Called to discuss plan for drawing repeat RVP on Monday prior to start of LD chemo as pt was previously positive for parainfluenza on 12/23. Pt reports symptoms are much improved, but reported that he was notified that his wife just tested positive for COVID on 1/5/22. Confirmed that Juvenal is isolating from his wife at this time. He reports that he spoke with his primary care physician this morning who arranged for a COVID test at 4pm this afternoon. Juvenal will keep us updated on the results of the testing. Repeat COVID test ordered in combination with RVP on Monday as well. No further questions or concerns at this time.

## 2022-01-07 ENCOUNTER — TELEPHONE (OUTPATIENT)
Dept: TRANSPLANT | Facility: CLINIC | Age: 57
End: 2022-01-07
Payer: COMMERCIAL

## 2022-01-07 LAB — SARS-COV-2 RNA RESP QL NAA+PROBE: NEGATIVE

## 2022-01-07 NOTE — TELEPHONE ENCOUNTER
Called pt to see if test results for COVID PCR have come back yet? No results yet at this time. Pt instructed to monitor for symptoms while he awaits results. If symptoms develop: Fever, cough, runny nose, sore throat, loss of taste or smell, nausea/vomiting were to develop, pt should contact the after hours number. Monoclonal antibodies could be indicated should his condition change d/t COVID.

## 2022-01-10 ENCOUNTER — LAB (OUTPATIENT)
Dept: LAB | Facility: CLINIC | Age: 57
End: 2022-01-10
Payer: COMMERCIAL

## 2022-01-10 DIAGNOSIS — Z86.2 PERSONAL HISTORY OF DISEASES OF BLOOD AND BLOOD-FORMING ORGANS: ICD-10-CM

## 2022-01-10 DIAGNOSIS — C82.00 FOLLICULAR LYMPHOMA GRADE I, UNSPECIFIED BODY REGION (H): ICD-10-CM

## 2022-01-10 LAB

## 2022-01-10 PROCEDURE — 87486 CHLMYD PNEUM DNA AMP PROBE: CPT | Mod: 90 | Performed by: PATHOLOGY

## 2022-01-10 PROCEDURE — U0005 INFEC AGEN DETEC AMPLI PROBE: HCPCS | Mod: 90 | Performed by: PATHOLOGY

## 2022-01-10 PROCEDURE — U0003 INFECTIOUS AGENT DETECTION BY NUCLEIC ACID (DNA OR RNA); SEVERE ACUTE RESPIRATORY SYNDROME CORONAVIRUS 2 (SARS-COV-2) (CORONAVIRUS DISEASE [COVID-19]), AMPLIFIED PROBE TECHNIQUE, MAKING USE OF HIGH THROUGHPUT TECHNOLOGIES AS DESCRIBED BY CMS-2020-01-R: HCPCS | Mod: 90 | Performed by: PATHOLOGY

## 2022-01-10 PROCEDURE — 87633 RESP VIRUS 12-25 TARGETS: CPT | Mod: 90 | Performed by: PATHOLOGY

## 2022-01-10 PROCEDURE — 99000 SPECIMEN HANDLING OFFICE-LAB: CPT | Performed by: PATHOLOGY

## 2022-01-10 PROCEDURE — 87581 M.PNEUMON DNA AMP PROBE: CPT | Mod: 90 | Performed by: PATHOLOGY

## 2022-01-11 DIAGNOSIS — C82.08 FOLLICULAR LYMPHOMA GRADE I, LYMPH NODES OF MULTIPLE SITES (H): ICD-10-CM

## 2022-01-11 DIAGNOSIS — C82.90 FOLLICULAR LYMPHOMA (H): Primary | ICD-10-CM

## 2022-01-11 RX ORDER — FLUCONAZOLE 100 MG/1
100 TABLET ORAL DAILY
Qty: 30 TABLET | Refills: 1 | Status: SHIPPED | OUTPATIENT
Start: 2022-01-11 | End: 2022-01-25

## 2022-01-11 NOTE — TELEPHONE ENCOUNTER
COVID/Parainfluenza negative. Proceed with LD chemo and admit for Yescarta on 1/17/22 after PICC line placement.    Ok to refill Fluconazole per Dr. Terry

## 2022-01-12 ENCOUNTER — ONCOLOGY VISIT (OUTPATIENT)
Dept: TRANSPLANT | Facility: CLINIC | Age: 57
End: 2022-01-12
Payer: COMMERCIAL

## 2022-01-12 ENCOUNTER — INFUSION THERAPY VISIT (OUTPATIENT)
Dept: TRANSPLANT | Facility: CLINIC | Age: 57
End: 2022-01-12
Payer: COMMERCIAL

## 2022-01-12 ENCOUNTER — APPOINTMENT (OUTPATIENT)
Dept: LAB | Facility: CLINIC | Age: 57
End: 2022-01-12
Payer: COMMERCIAL

## 2022-01-12 VITALS
HEART RATE: 66 BPM | TEMPERATURE: 98.3 F | OXYGEN SATURATION: 97 % | DIASTOLIC BLOOD PRESSURE: 61 MMHG | WEIGHT: 239 LBS | RESPIRATION RATE: 18 BRPM | BODY MASS INDEX: 35.28 KG/M2 | SYSTOLIC BLOOD PRESSURE: 103 MMHG

## 2022-01-12 DIAGNOSIS — C82.08 FOLLICULAR LYMPHOMA GRADE I, LYMPH NODES OF MULTIPLE SITES (H): Primary | ICD-10-CM

## 2022-01-12 DIAGNOSIS — C82.00 FOLLICULAR LYMPHOMA GRADE I, UNSPECIFIED BODY REGION (H): ICD-10-CM

## 2022-01-12 LAB
ALBUMIN SERPL-MCNC: 3.8 G/DL (ref 3.4–5)
ALP SERPL-CCNC: 95 U/L (ref 40–150)
ALT SERPL W P-5'-P-CCNC: 94 U/L (ref 0–70)
ANION GAP SERPL CALCULATED.3IONS-SCNC: 1 MMOL/L (ref 3–14)
AST SERPL W P-5'-P-CCNC: 36 U/L (ref 0–45)
BASOPHILS # BLD AUTO: 0 10E3/UL (ref 0–0.2)
BASOPHILS NFR BLD AUTO: 1 %
BILIRUB SERPL-MCNC: 0.4 MG/DL (ref 0.2–1.3)
BUN SERPL-MCNC: 12 MG/DL (ref 7–30)
CALCIUM SERPL-MCNC: 8.6 MG/DL (ref 8.5–10.1)
CHLORIDE BLD-SCNC: 110 MMOL/L (ref 94–109)
CO2 SERPL-SCNC: 23 MMOL/L (ref 20–32)
CREAT SERPL-MCNC: 1.28 MG/DL (ref 0.66–1.25)
CRP SERPL-MCNC: 3 MG/L (ref 0–8)
EOSINOPHIL # BLD AUTO: 0.1 10E3/UL (ref 0–0.7)
EOSINOPHIL NFR BLD AUTO: 2 %
ERYTHROCYTE [DISTWIDTH] IN BLOOD BY AUTOMATED COUNT: 14.8 % (ref 10–15)
FERRITIN SERPL-MCNC: 70 NG/ML (ref 26–388)
GFR SERPL CREATININE-BSD FRML MDRD: 65 ML/MIN/1.73M2
GLUCOSE BLD-MCNC: 114 MG/DL (ref 70–99)
HCT VFR BLD AUTO: 32.2 % (ref 40–53)
HGB BLD-MCNC: 11.1 G/DL (ref 13.3–17.7)
IMM GRANULOCYTES # BLD: 0 10E3/UL
IMM GRANULOCYTES NFR BLD: 1 %
LDH SERPL L TO P-CCNC: 212 U/L (ref 85–227)
LYMPHOCYTES # BLD AUTO: 0.4 10E3/UL (ref 0.8–5.3)
LYMPHOCYTES NFR BLD AUTO: 11 %
MAGNESIUM SERPL-MCNC: 2.2 MG/DL (ref 1.6–2.3)
MCH RBC QN AUTO: 33.2 PG (ref 26.5–33)
MCHC RBC AUTO-ENTMCNC: 34.5 G/DL (ref 31.5–36.5)
MCV RBC AUTO: 96 FL (ref 78–100)
MONOCYTES # BLD AUTO: 0.5 10E3/UL (ref 0–1.3)
MONOCYTES NFR BLD AUTO: 13 %
NEUTROPHILS # BLD AUTO: 2.7 10E3/UL (ref 1.6–8.3)
NEUTROPHILS NFR BLD AUTO: 72 %
NRBC # BLD AUTO: 0 10E3/UL
NRBC BLD AUTO-RTO: 0 /100
PHOSPHATE SERPL-MCNC: 3.2 MG/DL (ref 2.5–4.5)
PLATELET # BLD AUTO: 123 10E3/UL (ref 150–450)
POTASSIUM BLD-SCNC: 3.9 MMOL/L (ref 3.4–5.3)
PROT SERPL-MCNC: 6.4 G/DL (ref 6.8–8.8)
RBC # BLD AUTO: 3.34 10E6/UL (ref 4.4–5.9)
SODIUM SERPL-SCNC: 134 MMOL/L (ref 133–144)
URATE SERPL-MCNC: 5.1 MG/DL (ref 3.5–7.2)
WBC # BLD AUTO: 3.6 10E3/UL (ref 4–11)

## 2022-01-12 PROCEDURE — 83735 ASSAY OF MAGNESIUM: CPT | Performed by: INTERNAL MEDICINE

## 2022-01-12 PROCEDURE — 86140 C-REACTIVE PROTEIN: CPT | Performed by: INTERNAL MEDICINE

## 2022-01-12 PROCEDURE — 83615 LACTATE (LD) (LDH) ENZYME: CPT | Performed by: INTERNAL MEDICINE

## 2022-01-12 PROCEDURE — 80053 COMPREHEN METABOLIC PANEL: CPT | Performed by: INTERNAL MEDICINE

## 2022-01-12 PROCEDURE — G0463 HOSPITAL OUTPT CLINIC VISIT: HCPCS | Mod: 25

## 2022-01-12 PROCEDURE — 96417 CHEMO IV INFUS EACH ADDL SEQ: CPT

## 2022-01-12 PROCEDURE — 82728 ASSAY OF FERRITIN: CPT | Performed by: INTERNAL MEDICINE

## 2022-01-12 PROCEDURE — 96415 CHEMO IV INFUSION ADDL HR: CPT

## 2022-01-12 PROCEDURE — 84550 ASSAY OF BLOOD/URIC ACID: CPT | Performed by: INTERNAL MEDICINE

## 2022-01-12 PROCEDURE — 250N000011 HC RX IP 250 OP 636

## 2022-01-12 PROCEDURE — 85025 COMPLETE CBC W/AUTO DIFF WBC: CPT | Performed by: INTERNAL MEDICINE

## 2022-01-12 PROCEDURE — 84100 ASSAY OF PHOSPHORUS: CPT | Performed by: INTERNAL MEDICINE

## 2022-01-12 PROCEDURE — 250N000011 HC RX IP 250 OP 636: Performed by: PHYSICIAN ASSISTANT

## 2022-01-12 PROCEDURE — 36415 COLL VENOUS BLD VENIPUNCTURE: CPT | Performed by: INTERNAL MEDICINE

## 2022-01-12 PROCEDURE — 250N000011 HC RX IP 250 OP 636: Mod: JW | Performed by: INTERNAL MEDICINE

## 2022-01-12 PROCEDURE — 96375 TX/PRO/DX INJ NEW DRUG ADDON: CPT

## 2022-01-12 PROCEDURE — 258N000003 HC RX IP 258 OP 636: Performed by: INTERNAL MEDICINE

## 2022-01-12 PROCEDURE — 96413 CHEMO IV INFUSION 1 HR: CPT

## 2022-01-12 PROCEDURE — 99214 OFFICE O/P EST MOD 30 MIN: CPT

## 2022-01-12 RX ORDER — MEPERIDINE HYDROCHLORIDE 25 MG/ML
25 INJECTION INTRAMUSCULAR; INTRAVENOUS; SUBCUTANEOUS EVERY 30 MIN PRN
Status: CANCELLED | OUTPATIENT
Start: 2022-01-12

## 2022-01-12 RX ORDER — HEPARIN SODIUM (PORCINE) LOCK FLUSH IV SOLN 100 UNIT/ML 100 UNIT/ML
5 SOLUTION INTRAVENOUS
Status: DISCONTINUED | OUTPATIENT
Start: 2022-01-12 | End: 2022-01-12 | Stop reason: HOSPADM

## 2022-01-12 RX ORDER — ALLOPURINOL 300 MG/1
300 TABLET ORAL DAILY
Qty: 30 TABLET | Refills: 1 | COMMUNITY
Start: 2022-01-12 | End: 2022-02-07

## 2022-01-12 RX ORDER — METHYLPREDNISOLONE SODIUM SUCCINATE 125 MG/2ML
125 INJECTION, POWDER, LYOPHILIZED, FOR SOLUTION INTRAMUSCULAR; INTRAVENOUS
Status: CANCELLED
Start: 2022-01-12

## 2022-01-12 RX ORDER — HEPARIN SODIUM (PORCINE) LOCK FLUSH IV SOLN 100 UNIT/ML 100 UNIT/ML
5 SOLUTION INTRAVENOUS EVERY 8 HOURS
Status: DISCONTINUED | OUTPATIENT
Start: 2022-01-12 | End: 2022-01-12 | Stop reason: HOSPADM

## 2022-01-12 RX ORDER — HEPARIN SODIUM,PORCINE 10 UNIT/ML
5 VIAL (ML) INTRAVENOUS
Status: CANCELLED | OUTPATIENT
Start: 2022-01-12

## 2022-01-12 RX ORDER — PENTAMIDINE ISETHIONATE 300 MG/300MG
300 INHALANT RESPIRATORY (INHALATION) ONCE
Status: CANCELLED
Start: 2022-01-12 | End: 2022-01-12

## 2022-01-12 RX ORDER — ALBUTEROL SULFATE 0.83 MG/ML
2.5 SOLUTION RESPIRATORY (INHALATION) ONCE
Status: CANCELLED
Start: 2022-01-12 | End: 2022-01-12

## 2022-01-12 RX ORDER — ALBUTEROL SULFATE 90 UG/1
1-2 AEROSOL, METERED RESPIRATORY (INHALATION)
Status: CANCELLED
Start: 2022-01-12

## 2022-01-12 RX ORDER — NALOXONE HYDROCHLORIDE 0.4 MG/ML
0.2 INJECTION, SOLUTION INTRAMUSCULAR; INTRAVENOUS; SUBCUTANEOUS
Status: CANCELLED | OUTPATIENT
Start: 2022-01-12

## 2022-01-12 RX ORDER — EPINEPHRINE 1 MG/ML
0.3 INJECTION, SOLUTION INTRAMUSCULAR; SUBCUTANEOUS EVERY 5 MIN PRN
Status: CANCELLED | OUTPATIENT
Start: 2022-01-12

## 2022-01-12 RX ORDER — GRANISETRON HYDROCHLORIDE 1 MG/ML
1 INJECTION INTRAVENOUS ONCE
Status: COMPLETED | OUTPATIENT
Start: 2022-01-12 | End: 2022-01-12

## 2022-01-12 RX ORDER — ALBUTEROL SULFATE 0.83 MG/ML
2.5 SOLUTION RESPIRATORY (INHALATION)
Status: CANCELLED | OUTPATIENT
Start: 2022-01-12

## 2022-01-12 RX ORDER — HEPARIN SODIUM (PORCINE) LOCK FLUSH IV SOLN 100 UNIT/ML 100 UNIT/ML
5 SOLUTION INTRAVENOUS
Status: CANCELLED | OUTPATIENT
Start: 2022-01-12

## 2022-01-12 RX ORDER — DIPHENHYDRAMINE HYDROCHLORIDE 50 MG/ML
50 INJECTION INTRAMUSCULAR; INTRAVENOUS
Status: CANCELLED
Start: 2022-01-12

## 2022-01-12 RX ORDER — SODIUM CHLORIDE 9 MG/ML
INJECTION, SOLUTION INTRAVENOUS CONTINUOUS
Status: DISCONTINUED | OUTPATIENT
Start: 2022-01-12 | End: 2022-01-12 | Stop reason: HOSPADM

## 2022-01-12 RX ADMIN — SODIUM CHLORIDE, PRESERVATIVE FREE 5 ML: 5 INJECTION INTRAVENOUS at 13:05

## 2022-01-12 RX ADMIN — SODIUM CHLORIDE: 9 INJECTION, SOLUTION INTRAVENOUS at 08:13

## 2022-01-12 RX ADMIN — FLUDARABINE PHOSPHATE 69 MG: 25 INJECTION, SOLUTION INTRAVENOUS at 11:49

## 2022-01-12 RX ADMIN — GRANISETRON HYDROCHLORIDE 1 MG: 1 INJECTION, SOLUTION INTRAVENOUS at 08:45

## 2022-01-12 RX ADMIN — SODIUM CHLORIDE, PRESERVATIVE FREE 5 ML: 5 INJECTION INTRAVENOUS at 07:56

## 2022-01-12 RX ADMIN — CYCLOPHOSPHAMIDE 1150 MG: 1 INJECTION, POWDER, FOR SOLUTION INTRAVENOUS; ORAL at 09:27

## 2022-01-12 ASSESSMENT — PAIN SCALES - GENERAL: PAINLEVEL: NO PAIN (1)

## 2022-01-12 NOTE — NURSING NOTE
"Oncology Rooming Note    January 12, 2022 7:43 AM   Juvenal Blake is a 56 year old male who presents for:    Chief Complaint   Patient presents with     RECHECK     post bmt for lymphoma here for labs, md visit and infusion     Initial Vitals: There were no vitals taken for this visit. Estimated body mass index is 35.34 kg/m  as calculated from the following:    Height as of 1/4/22: 1.753 m (5' 9.02\").    Weight as of 1/4/22: 108.6 kg (239 lb 6.4 oz). There is no height or weight on file to calculate BSA.  Data Unavailable Comment: Data Unavailable   No LMP for male patient.  Allergies reviewed: Yes  Medications reviewed: Yes    Medications: Medication refills not needed today.  Pharmacy name entered into [employer]: [id number] - [place], [place] - [address] AT [facility]    Clinical concerns: none       Wanda Rose RN              "

## 2022-01-12 NOTE — NURSING NOTE
Chief Complaint   Patient presents with     Chemotherapy     scheduled day 1 cycle 1 cytoxan/fludara infusion.  hx lymphoma.     Rebecca Jessica RN

## 2022-01-12 NOTE — PROGRESS NOTES
BMT Clinic Note     Juvenal Blake is a 56 year old male PMH significant for refractory NHL, s/p Yescarta CAR-T cellular therapy. S/p , bridging chemo with tod (without rituxan)      HPI: Juvenal is feeling alright. Has stable morning nausea, uses compazine and cannabis for this as needed. No diarrhea. Still feels upper thigh/groin lump, not significantly changed. Cough much better, nearly resolved. No fevers. No new rashes, bruises or bleeding.    ROS: 8 point ROS negative except as above.      Physical Exam:   /61   Pulse 66   Temp 98.3  F (36.8  C)   Resp 18   Wt 108.4 kg (239 lb)   SpO2 97%   BMI 35.28 kg/m      GA: NAD. Appears as stated age.  HEENT: PERRL, OP Clear  Neck: Supple, no JVD. Neck fullness without lymphadenopathy   CV: RRR, no mrg  Lungs: clear in bases, faint expiratory wheeze right upper lung  Abd: Soft, nt, protuberant belly  Ext: 1+ edema. Warm  Neuro: AAO, moving all ext. Symmetric face  Skin: warm, no rashes  Psyc: Appropriate and cooperative  Access: peripheral access                                                                                                                                                                ECO    Labs:  Lab Results   Component Value Date    WBC 3.6 (L) 2022    ANEU 0.6 (L) 2021    HGB 11.1 (L) 2022    HCT 32.2 (L) 2022     (L) 2022     2022    POTASSIUM 3.9 2022    CHLORIDE 110 (H) 2022    CO2 23 2022     (H) 2022    BUN 12 2022    CR 1.28 (H) 2022    MAG 2.2 2022    INR 1.01 2021    BILITOTAL 0.4 2022    AST 36 2022    ALT 94 (H) 2022    ALKPHOS 95 2022    PROTTOTAL 6.4 (L) 2022    ALBUMIN 3.8 2022       Assessment and Plan:   Juvenal A Latessa is a 56 year old male PMH significant for refractory NHL, undergoing  NK infusion. Admitted with rituxan (biosimilar) infusion reaction  and remained  admitted through completion of lymphodepleting chemo and  NK cell infusion. Now presents for LD chemo prep for yescarta cells 1/17    1. Follicular Lymphoma:   Relapsed after BR, OCHOP, NAM NK and now . He has had idelalisib as a bridge and responded.    Yescarta:   Collection on 12/13.  S/p 12/21 Bridging: tod only (no rituxan due to reaction last cycle), tumor biopsy done 12/20.   Start LD Chemo today, 1/12-14  Cells should be ready ~Jan 4.  Start allopurinol 1/12 (pt states he has this at home)    2. HEME: Keep hgb >7g/dL, plt>10,000.  - No anticipated transfusion needs next week.    Mild anemia and thrombocytopenia secondary to chemotherapy/lymphoma. No transfusions needed.     3 ID: afebrile, mild cough improved  Rhinovirus+10/6. CXR with mild bronchial thickening but no PNA. IgG low at 240, got IVIG last month. No real indication for continued IVIG replacement.  Cough/cold symptoms for last several weeks CXR negative. Covid neg Rhino virus may still be lingering. Still, pt prefers to use levaquin at 500mg for a week as this has been helpful for him in the past. While not clearly clinically indicated at this time, CXR okay and no fever, will allow this to try to clear symptoms prior to cell therapy. Improved 1/12    Prophy: acyclovir, fluconazole. levaquin to start when neutropenic (pt states he has levaquin at home, not sure strength)  Was on bactrim, give pentamidine this week and hold bactrim starting 1/17    Hx C diff colitis   S/p 3 covid shots; first two were pre- and booster was after.  Defer to car -t team regarding if revaccination needed post CART.     4. CV: No complaints of chest pain.   Cardiology cleared him to proceed with no further testing. The CT angiogram showed no lesion requiring stenting or bypass.    5. :   Post radical prostatectomy and bilateral lymph node dissection. November 15, 2017. Prostatic adenocarcinoma [place] 4+3 = 7 with tertiary pattern 5. Tumor involves  45% of total surface area. Positive for extensive extraprostatic extension. Positive for bilateral seminal vesicle invasion. Multiple margins positive. Lymphovascular invasion not identified. Perineural invasion was noted. Lymph nodes negative. 3 were examined (2 right obturator and 1 left obturator). Completed radiation to the prostate fossa and pelvic lymph nodes at [facility] early May 2018. He received 4500 cGy in 25 fractions. He then had 2340 cGy boost in 13 fractions to the prostatic fossa, for a total dose of 6240 cGy in 38 treatment fractions delivered over 54 days. He did not receive hormonal therapy:    9/10 PSA: 0.71 - no concerns.      6.FEN/Renal:   - Cr mildly elevated.    - Lytes stable.      7. GI:  Consider scheduling compazine this week as nausea may increase with LD chemo  #intermittent nausea -  medical cannibis.  #Abdominal Pain secondary to cancer. He is in remission, but still has this pain.      8. Dental: s/p root canal; well healed and mouth feels good.       Plan: given flu/cy today as planned  Requested he bring in whatever levaquin/allopurinol he has to ensure he is taking appropriate doses  Schedule pentamidine this week    RTC Thurs/Fri for flu/cy  Monday PICC line placement, admission for yescarta    30 minutes was spent on this appointment which included patient exam, chart review, orders required and review, consult with ordering chemotherapy and change in treatment plan, as well as future therapy anticipated.    Narcisa Stewart PAC  [phone]

## 2022-01-12 NOTE — LETTER
Date:January 17, 2022      Provider requested that no letter be sent. Do not send.       [facility]

## 2022-01-12 NOTE — LETTER
1/12/2022         RE: Juvenal Blake  [address] [place] [address]        Dear Colleague,    Thank you for referring your patient, Juvenal Blake, to the [facility] BLOOD AND MARROW TRANSPLANT PROGRAM [place]. Please see a copy of my visit note below.    Infusion Nursing Note:  Juvenal Blake presents today for Day 1 Cycle 1 cytoxan and fludarabine.    Patient seen by provider today: Yes: Narcisa Stewart NAYA   present during visit today: Not Applicable.    Note:   Pt received 0.9 NS at 250 ml/hr starting one hour pre-chemo and running continuous until 1 hour post chemo.    Pt received kytril for nausea as premed.    Pt received cytoxan 1150 mg IV over 2 hours    Pt received fludarabine 69 mg IV over one hour.      Intravenous Access:  Implanted Port.    Treatment Conditions:  Lab Results   Component Value Date    HGB 11.1 (L) 01/12/2022    WBC 3.6 (L) 01/12/2022    ANEU 0.6 (L) 09/08/2021    ANEUTAUTO 2.7 01/12/2022     (L) 01/12/2022      Lab Results   Component Value Date     01/12/2022    POTASSIUM 3.9 01/12/2022    MAG 2.2 01/12/2022    CR 1.28 (H) 01/12/2022    TRE 8.6 01/12/2022    BILITOTAL 0.4 01/12/2022    ALBUMIN 3.8 01/12/2022    ALT 94 (H) 01/12/2022    AST 36 01/12/2022         Post Infusion Assessment:  Patient tolerated infusion without incident.  Blood return noted pre and post infusion.  Site patent and intact, free from redness, edema or discomfort.  No evidence of extravasations.  Access discontinued per protocol.       Discharge Plan:   Discharge instructions reviewed with: Patient.  Patient discharged in stable condition accompanied by: self.  Departure Mode: Ambulatory.      Cherie Jessica RN                          Again, thank you for allowing me to participate in the care of your patient.        Sincerely,        [facility]

## 2022-01-12 NOTE — PATIENT INSTRUCTIONS
Cont acyclovir and fluconazole  Start allopurinol    Bring allopurinol/levaquin bottles from home to confirm dose

## 2022-01-12 NOTE — PROGRESS NOTES
Infusion Nursing Note:  Juvenal Blake presents today for Day 1 Cycle 1 cytoxan and fludarabine.    Patient seen by provider today: Yes: Narcisa Stewart NAYA   present during visit today: Not Applicable.    Note:   Pt received 0.9 NS at 250 ml/hr starting one hour pre-chemo and running continuous until 1 hour post chemo.    Pt received kytril for nausea as premed.    Pt received cytoxan 1150 mg IV over 2 hours    Pt received fludarabine 69 mg IV over one hour.      Intravenous Access:  Implanted Port.    Treatment Conditions:  Lab Results   Component Value Date    HGB 11.1 (L) 01/12/2022    WBC 3.6 (L) 01/12/2022    ANEU 0.6 (L) 09/08/2021    ANEUTAUTO 2.7 01/12/2022     (L) 01/12/2022      Lab Results   Component Value Date     01/12/2022    POTASSIUM 3.9 01/12/2022    MAG 2.2 01/12/2022    CR 1.28 (H) 01/12/2022    TRE 8.6 01/12/2022    BILITOTAL 0.4 01/12/2022    ALBUMIN 3.8 01/12/2022    ALT 94 (H) 01/12/2022    AST 36 01/12/2022         Post Infusion Assessment:  Patient tolerated infusion without incident.  Blood return noted pre and post infusion.  Site patent and intact, free from redness, edema or discomfort.  No evidence of extravasations.  Access discontinued per protocol.       Discharge Plan:   Discharge instructions reviewed with: Patient.  Patient discharged in stable condition accompanied by: self.  Departure Mode: Ambulatory.      Cherie Jessica RN

## 2022-01-12 NOTE — LETTER
2022         RE: Juvenal Blake  [address] [place] [address]        Dear Colleague,    Thank you for referring your patient, Juvenal Blake, to the [facility] BLOOD AND MARROW TRANSPLANT PROGRAM [place]. Please see a copy of my visit note below.    BMT Clinic Note     Juvenal Blake is a 56 year old male PMH significant for refractory NHL, s/p Yescarta CAR-T cellular therapy. S/p , bridging chemo with tod (without rituxan)      HPI: Juvenal is feeling alright. Has stable morning nausea, uses compazine and cannabis for this as needed. No diarrhea. Still feels upper thigh/groin lump, not significantly changed. Cough much better, nearly resolved. No fevers. No new rashes, bruises or bleeding.    ROS: 8 point ROS negative except as above.      Physical Exam:   /61   Pulse 66   Temp 98.3  F (36.8  C)   Resp 18   Wt 108.4 kg (239 lb)   SpO2 97%   BMI 35.28 kg/m      GA: NAD. Appears as stated age.  HEENT: PERRL, OP Clear  Neck: Supple, no JVD. Neck fullness without lymphadenopathy   CV: RRR, no mrg  Lungs: clear in bases, faint expiratory wheeze right upper lung  Abd: Soft, nt, protuberant belly  Ext: 1+ edema. Warm  Neuro: AAO, moving all ext. Symmetric face  Skin: warm, no rashes  Psyc: Appropriate and cooperative  Access: peripheral access                                                                                                                                                                ECO    Labs:  Lab Results   Component Value Date    WBC 3.6 (L) 2022    ANEU 0.6 (L) 2021    HGB 11.1 (L) 2022    HCT 32.2 (L) 2022     (L) 2022     2022    POTASSIUM 3.9 2022    CHLORIDE 110 (H) 2022    CO2 23 2022     (H) 2022    BUN 12 2022    CR 1.28 (H) 2022    MAG 2.2 2022    INR 1.01 2021    BILITOTAL 0.4 2022    AST 36 2022    ALT 94 (H) 2022     ALKPHOS 95 01/12/2022    PROTTOTAL 6.4 (L) 01/12/2022    ALBUMIN 3.8 01/12/2022       Assessment and Plan:   Juvenal Blake is a 56 year old male PMH significant for refractory NHL, undergoing  NK infusion. Admitted with rituxan (biosimilar) infusion reaction 6/24 and remained admitted through completion of lymphodepleting chemo and  NK cell infusion. Now presents for LD chemo prep for yescarta cells 1/17 1. Follicular Lymphoma:   Relapsed after BR, OCHOP, NAM NK and now . He has had idelalisib as a bridge and responded.    Yescarta:   Collection on 12/13.  S/p 12/21 Bridging: tod only (no rituxan due to reaction last cycle), tumor biopsy done 12/20.   Start LD Chemo today, 1/12-14  Cells should be ready ~Jan 4.  Start allopurinol 1/12 (pt states he has this at home)    2. HEME: Keep hgb >7g/dL, plt>10,000.  - No anticipated transfusion needs next week.    Mild anemia and thrombocytopenia secondary to chemotherapy/lymphoma. No transfusions needed.     3 ID: afebrile, mild cough improved  Rhinovirus+10/6. CXR with mild bronchial thickening but no PNA. IgG low at 240, got IVIG last month. No real indication for continued IVIG replacement.  Cough/cold symptoms for last several weeks CXR negative. Covid neg Rhino virus may still be lingering. Still, pt prefers to use levaquin at 500mg for a week as this has been helpful for him in the past. While not clearly clinically indicated at this time, CXR okay and no fever, will allow this to try to clear symptoms prior to cell therapy. Improved 1/12    Prophy: acyclovir, fluconazole. levaquin to start when neutropenic (pt states he has levaquin at home, not sure strength)  Was on bactrim, give pentamidine this week and hold bactrim starting 1/17    Hx C diff colitis   S/p 3 covid shots; first two were pre- and booster was after.  Defer to car -t team regarding if revaccination needed post CART.     4. CV: No complaints of chest pain.   Cardiology  cleared him to proceed with no further testing. The CT angiogram showed no lesion requiring stenting or bypass.    5. :   Post radical prostatectomy and bilateral lymph node dissection. November 15, 2017. Prostatic adenocarcinoma [place] 4+3 = 7 with tertiary pattern 5. Tumor involves 45% of total surface area. Positive for extensive extraprostatic extension. Positive for bilateral seminal vesicle invasion. Multiple margins positive. Lymphovascular invasion not identified. Perineural invasion was noted. Lymph nodes negative. 3 were examined (2 right obturator and 1 left obturator). Completed radiation to the prostate fossa and pelvic lymph nodes at [facility] early May 2018. He received 4500 cGy in 25 fractions. He then had 2340 cGy boost in 13 fractions to the prostatic fossa, for a total dose of 6240 cGy in 38 treatment fractions delivered over 54 days. He did not receive hormonal therapy:    9/10 PSA: 0.71 - no concerns.      6.FEN/Renal:   - Cr mildly elevated.    - Lytes stable.      7. GI:  Consider scheduling compazine this week as nausea may increase with LD chemo  #intermittent nausea -  medical cannibis.  #Abdominal Pain secondary to cancer. He is in remission, but still has this pain.      8. Dental: s/p root canal; well healed and mouth feels good.       Plan: given flu/cy today as planned  Requested he bring in whatever levaquin/allopurinol he has to ensure he is taking appropriate doses  Schedule pentamidine this week    RTC Thurs/Fri for flu/cy  Monday PICC line placement, admission for yescarta    30 minutes was spent on this appointment which included patient exam, chart review, orders required and review, consult with ordering chemotherapy and change in treatment plan, as well as future therapy anticipated.    Narcisa Stewart [facility]  [phone]                        Again, thank you for allowing me to participate in the care of your patient.        Sincerely,        BMT Advanced Practice  Provider

## 2022-01-13 ENCOUNTER — INFUSION THERAPY VISIT (OUTPATIENT)
Dept: TRANSPLANT | Facility: CLINIC | Age: 57
End: 2022-01-13
Payer: COMMERCIAL

## 2022-01-13 ENCOUNTER — ONCOLOGY VISIT (OUTPATIENT)
Dept: TRANSPLANT | Facility: CLINIC | Age: 57
End: 2022-01-13
Attending: PHYSICIAN ASSISTANT
Payer: COMMERCIAL

## 2022-01-13 ENCOUNTER — LAB (OUTPATIENT)
Dept: LAB | Facility: CLINIC | Age: 57
End: 2022-01-13
Payer: COMMERCIAL

## 2022-01-13 VITALS
DIASTOLIC BLOOD PRESSURE: 70 MMHG | RESPIRATION RATE: 16 BRPM | HEART RATE: 66 BPM | OXYGEN SATURATION: 100 % | TEMPERATURE: 98.4 F | BODY MASS INDEX: 35.2 KG/M2 | SYSTOLIC BLOOD PRESSURE: 125 MMHG | WEIGHT: 238.5 LBS

## 2022-01-13 DIAGNOSIS — C82.08 FOLLICULAR LYMPHOMA GRADE I, LYMPH NODES OF MULTIPLE SITES (H): ICD-10-CM

## 2022-01-13 DIAGNOSIS — C82.00 FOLLICULAR LYMPHOMA GRADE I, UNSPECIFIED BODY REGION (H): ICD-10-CM

## 2022-01-13 DIAGNOSIS — C82.00 FOLLICULAR LYMPHOMA GRADE I, UNSPECIFIED BODY REGION (H): Primary | ICD-10-CM

## 2022-01-13 DIAGNOSIS — C82.08 FOLLICULAR LYMPHOMA GRADE I, LYMPH NODES OF MULTIPLE SITES (H): Primary | ICD-10-CM

## 2022-01-13 DIAGNOSIS — Z85.46 HISTORY OF MALIGNANT NEOPLASM OF PROSTATE: Primary | ICD-10-CM

## 2022-01-13 LAB
ALBUMIN SERPL-MCNC: 3.8 G/DL (ref 3.4–5)
ALP SERPL-CCNC: 93 U/L (ref 40–150)
ALT SERPL W P-5'-P-CCNC: 80 U/L (ref 0–70)
ANION GAP SERPL CALCULATED.3IONS-SCNC: <1 MMOL/L (ref 3–14)
AST SERPL W P-5'-P-CCNC: 27 U/L (ref 0–45)
BASOPHILS # BLD AUTO: 0 10E3/UL (ref 0–0.2)
BASOPHILS NFR BLD AUTO: 1 %
BILIRUB SERPL-MCNC: 0.4 MG/DL (ref 0.2–1.3)
BUN SERPL-MCNC: 13 MG/DL (ref 7–30)
CALCIUM SERPL-MCNC: 8.7 MG/DL (ref 8.5–10.1)
CHLORIDE BLD-SCNC: 110 MMOL/L (ref 94–109)
CO2 SERPL-SCNC: 26 MMOL/L (ref 20–32)
CREAT SERPL-MCNC: 1.23 MG/DL (ref 0.66–1.25)
EOSINOPHIL # BLD AUTO: 0.1 10E3/UL (ref 0–0.7)
EOSINOPHIL NFR BLD AUTO: 3 %
ERYTHROCYTE [DISTWIDTH] IN BLOOD BY AUTOMATED COUNT: 14.7 % (ref 10–15)
GFR SERPL CREATININE-BSD FRML MDRD: 68 ML/MIN/1.73M2
GLUCOSE BLD-MCNC: 116 MG/DL (ref 70–99)
HCT VFR BLD AUTO: 32.2 % (ref 40–53)
HGB BLD-MCNC: 11 G/DL (ref 13.3–17.7)
IMM GRANULOCYTES # BLD: 0 10E3/UL
IMM GRANULOCYTES NFR BLD: 0 %
LYMPHOCYTES # BLD AUTO: 0.2 10E3/UL (ref 0.8–5.3)
LYMPHOCYTES NFR BLD AUTO: 5 %
MAGNESIUM SERPL-MCNC: 2.1 MG/DL (ref 1.6–2.3)
MCH RBC QN AUTO: 33.1 PG (ref 26.5–33)
MCHC RBC AUTO-ENTMCNC: 34.2 G/DL (ref 31.5–36.5)
MCV RBC AUTO: 97 FL (ref 78–100)
MONOCYTES # BLD AUTO: 0.4 10E3/UL (ref 0–1.3)
MONOCYTES NFR BLD AUTO: 10 %
NEUTROPHILS # BLD AUTO: 2.9 10E3/UL (ref 1.6–8.3)
NEUTROPHILS NFR BLD AUTO: 81 %
NRBC # BLD AUTO: 0 10E3/UL
NRBC BLD AUTO-RTO: 0 /100
PHOSPHATE SERPL-MCNC: 2.4 MG/DL (ref 2.5–4.5)
PLATELET # BLD AUTO: 114 10E3/UL (ref 150–450)
POTASSIUM BLD-SCNC: 4.1 MMOL/L (ref 3.4–5.3)
PROT SERPL-MCNC: 6.2 G/DL (ref 6.8–8.8)
RBC # BLD AUTO: 3.32 10E6/UL (ref 4.4–5.9)
SODIUM SERPL-SCNC: 136 MMOL/L (ref 133–144)
URATE SERPL-MCNC: 4.6 MG/DL (ref 3.5–7.2)
WBC # BLD AUTO: 3.6 10E3/UL (ref 4–11)

## 2022-01-13 PROCEDURE — 99212 OFFICE O/P EST SF 10 MIN: CPT

## 2022-01-13 PROCEDURE — 94640 AIRWAY INHALATION TREATMENT: CPT | Performed by: INTERNAL MEDICINE

## 2022-01-13 PROCEDURE — 250N000011 HC RX IP 250 OP 636: Performed by: PHYSICIAN ASSISTANT

## 2022-01-13 PROCEDURE — 36591 DRAW BLOOD OFF VENOUS DEVICE: CPT | Performed by: INTERNAL MEDICINE

## 2022-01-13 PROCEDURE — 84550 ASSAY OF BLOOD/URIC ACID: CPT | Performed by: INTERNAL MEDICINE

## 2022-01-13 PROCEDURE — 96417 CHEMO IV INFUS EACH ADDL SEQ: CPT

## 2022-01-13 PROCEDURE — 94642 AEROSOL INHALATION TREATMENT: CPT | Performed by: INTERNAL MEDICINE

## 2022-01-13 PROCEDURE — 96375 TX/PRO/DX INJ NEW DRUG ADDON: CPT

## 2022-01-13 PROCEDURE — 258N000003 HC RX IP 258 OP 636: Performed by: INTERNAL MEDICINE

## 2022-01-13 PROCEDURE — 83735 ASSAY OF MAGNESIUM: CPT | Performed by: INTERNAL MEDICINE

## 2022-01-13 PROCEDURE — 84100 ASSAY OF PHOSPHORUS: CPT | Performed by: INTERNAL MEDICINE

## 2022-01-13 PROCEDURE — 82310 ASSAY OF CALCIUM: CPT | Performed by: INTERNAL MEDICINE

## 2022-01-13 PROCEDURE — 250N000011 HC RX IP 250 OP 636: Performed by: INTERNAL MEDICINE

## 2022-01-13 PROCEDURE — 96413 CHEMO IV INFUSION 1 HR: CPT

## 2022-01-13 PROCEDURE — 250N000011 HC RX IP 250 OP 636

## 2022-01-13 PROCEDURE — 85025 COMPLETE CBC W/AUTO DIFF WBC: CPT | Performed by: INTERNAL MEDICINE

## 2022-01-13 PROCEDURE — G0463 HOSPITAL OUTPT CLINIC VISIT: HCPCS | Mod: 25

## 2022-01-13 RX ORDER — ACYCLOVIR 800 MG/1
800 TABLET ORAL EVERY 12 HOURS
Qty: 60 TABLET | Refills: 0 | Status: SHIPPED | OUTPATIENT
Start: 2022-01-13 | End: 2022-06-18

## 2022-01-13 RX ORDER — METHYLPREDNISOLONE SODIUM SUCCINATE 125 MG/2ML
125 INJECTION, POWDER, LYOPHILIZED, FOR SOLUTION INTRAMUSCULAR; INTRAVENOUS
Status: CANCELLED
Start: 2022-02-10

## 2022-01-13 RX ORDER — SODIUM CHLORIDE 9 MG/ML
INJECTION, SOLUTION INTRAVENOUS CONTINUOUS
Status: DISCONTINUED | OUTPATIENT
Start: 2022-01-13 | End: 2022-01-13 | Stop reason: HOSPADM

## 2022-01-13 RX ORDER — HEPARIN SODIUM (PORCINE) LOCK FLUSH IV SOLN 100 UNIT/ML 100 UNIT/ML
5 SOLUTION INTRAVENOUS
Status: COMPLETED | OUTPATIENT
Start: 2022-01-13 | End: 2022-01-13

## 2022-01-13 RX ORDER — ALBUTEROL SULFATE 0.83 MG/ML
2.5 SOLUTION RESPIRATORY (INHALATION) ONCE
Status: CANCELLED
Start: 2022-02-10 | End: 2022-02-10

## 2022-01-13 RX ORDER — MEPERIDINE HYDROCHLORIDE 25 MG/ML
25 INJECTION INTRAMUSCULAR; INTRAVENOUS; SUBCUTANEOUS EVERY 30 MIN PRN
Status: CANCELLED | OUTPATIENT
Start: 2022-02-10

## 2022-01-13 RX ORDER — NALOXONE HYDROCHLORIDE 0.4 MG/ML
0.2 INJECTION, SOLUTION INTRAMUSCULAR; INTRAVENOUS; SUBCUTANEOUS
Status: CANCELLED | OUTPATIENT
Start: 2022-02-10

## 2022-01-13 RX ORDER — PENTAMIDINE ISETHIONATE 300 MG/300MG
300 INHALANT RESPIRATORY (INHALATION) ONCE
Status: CANCELLED
Start: 2022-02-10 | End: 2022-02-10

## 2022-01-13 RX ORDER — DIPHENHYDRAMINE HYDROCHLORIDE 50 MG/ML
50 INJECTION INTRAMUSCULAR; INTRAVENOUS
Status: CANCELLED
Start: 2022-02-10

## 2022-01-13 RX ORDER — ALBUTEROL SULFATE 90 UG/1
1-2 AEROSOL, METERED RESPIRATORY (INHALATION)
Status: CANCELLED
Start: 2022-02-10

## 2022-01-13 RX ORDER — ALBUTEROL SULFATE 0.83 MG/ML
2.5 SOLUTION RESPIRATORY (INHALATION) ONCE
Status: COMPLETED | OUTPATIENT
Start: 2022-01-13 | End: 2022-01-13

## 2022-01-13 RX ORDER — PENTAMIDINE ISETHIONATE 300 MG/300MG
300 INHALANT RESPIRATORY (INHALATION) ONCE
Status: COMPLETED | OUTPATIENT
Start: 2022-01-13 | End: 2022-01-13

## 2022-01-13 RX ORDER — GRANISETRON HYDROCHLORIDE 1 MG/ML
1 INJECTION INTRAVENOUS ONCE
Status: COMPLETED | OUTPATIENT
Start: 2022-01-13 | End: 2022-01-13

## 2022-01-13 RX ORDER — ALBUTEROL SULFATE 0.83 MG/ML
2.5 SOLUTION RESPIRATORY (INHALATION)
Status: CANCELLED | OUTPATIENT
Start: 2022-02-10

## 2022-01-13 RX ORDER — EPINEPHRINE 1 MG/ML
0.3 INJECTION, SOLUTION, CONCENTRATE INTRAVENOUS EVERY 5 MIN PRN
Status: CANCELLED | OUTPATIENT
Start: 2022-02-10

## 2022-01-13 RX ADMIN — PENTAMIDINE ISETHIONATE 300 MG: 300 INHALANT RESPIRATORY (INHALATION) at 15:14

## 2022-01-13 RX ADMIN — FLUDARABINE PHOSPHATE 69 MG: 25 INJECTION, SOLUTION INTRAVENOUS at 12:45

## 2022-01-13 RX ADMIN — ALBUTEROL SULFATE 2.5 MG: 0.83 SOLUTION RESPIRATORY (INHALATION) at 15:13

## 2022-01-13 RX ADMIN — CYCLOPHOSPHAMIDE 1150 MG: 1 INJECTION, POWDER, FOR SOLUTION INTRAVENOUS; ORAL at 11:30

## 2022-01-13 RX ADMIN — Medication 5 ML: at 09:47

## 2022-01-13 RX ADMIN — GRANISETRON HYDROCHLORIDE 1 MG: 1 INJECTION, SOLUTION INTRAVENOUS at 10:47

## 2022-01-13 RX ADMIN — SODIUM CHLORIDE: 9 INJECTION, SOLUTION INTRAVENOUS at 10:09

## 2022-01-13 ASSESSMENT — PAIN SCALES - GENERAL: PAINLEVEL: MILD PAIN (2)

## 2022-01-13 NOTE — PROGRESS NOTES
Patient was seen today for a Pentamidine nebulizer tx ordered by Sherry Maldonado.    Patient was first given 2.5 mg Albuterol nebulizer, after which Pentamidine 300 mg (Lot # [id number]) mixed with 6cc Sterile H2O was administered through a filtered nebulizer.    Pre-treatment: SpO2 = 100%     HR = 63     BBS = clear  Post-treatment: SpO2 = 100%     HR = 67     BBS = clear    No adverse side effects noted by the patient.    Procedure was completed today by JUANPABLO Childs.

## 2022-01-13 NOTE — NURSING NOTE
"Chief Complaint   Patient presents with     Port Draw     labs drawn from port by rn.  vs taken     Port accessed with 20 gauge 3/4\" Power needle and labs drawn by rn.  Port flushed with NS and heparin.  Pt tolerated well.  VS taken.  Pt checked in for next appt.    Lexus Byrd RN      "

## 2022-01-13 NOTE — PROGRESS NOTES
Infusion Nursing Note:  Juvenal Blake presents today for Day 2, Cycle 1 of Flu/Cy prior to Yescarta.    Patient seen by provider today: Yes: Penny Carrier   present during visit today: Not Applicable.    Note: Lab results monitored; ok per Penny to proceed with today's infusion. NS continuous IVF started 1 hour prior to chemo at 250mL/hr. 1mg IVP Kytril given 30 minutes prior to chemo. Cytoxan checked by 2nd RN, infused over 1 hour. Fludarabine checked by 2nd RN, infused over 1 hour. IVF continued for 1 hour post chemo infusions. VSS throughout.      Intravenous Access:  Implanted Port.  +BR noted before and after chemo infusions    Treatment Conditions:  Not Applicable.      Post Infusion Assessment:  Patient tolerated infusion without incident.       Discharge Plan:   Patient discharged in stable condition accompanied by: self.      Apryl Vallejo RN

## 2022-01-13 NOTE — PROGRESS NOTES
BMT Clinic Note     Juvenal Blake is a 56 year old male PMH significant for refractory NHLS/p , bridging chemo with tod (without rituxan).  Now getting LD chemo in prep for Yescarta.      HPI: Juvenal was seen today mainly to manage medications.  He required refill of acyclovir; and he needed to confirm his levaquin dose.  He has both 750mg and 250mg levaquin strengths.    ROS: positive for nausea     Physical Exam:   /70 (BP Location: Right arm, Patient Position: Sitting, Cuff Size: Adult Large)   Pulse 66   Temp 98.4  F (36.9  C) (Tympanic)   Resp 16   Wt 108.2 kg (238 lb 8 oz)   SpO2 100%   BMI 35.20 kg/m      GA: NAD. Appears as stated age.  HEENT: PERRL   CV: well perfused  Lungs: no increased WOB; breathing comfortably on room air  Abd:  protuberant belly  Neuro: AAO, moving all ext. Symmetric face  Psyc: Appropriate and cooperative               ECO    Labs:  Lab Results   Component Value Date    WBC 3.6 (L) 2022    ANEU 0.6 (L) 2021    HGB 11.0 (L) 2022    HCT 32.2 (L) 2022     (L) 2022     2022    POTASSIUM 4.1 2022    CHLORIDE 110 (H) 2022    CO2 26 2022     (H) 2022    BUN 13 2022    CR 1.23 2022    MAG 2.1 2022    INR 1.01 2021    BILITOTAL 0.4 2022    AST 27 2022    ALT 80 (H) 2022    ALKPHOS 93 2022    PROTTOTAL 6.2 (L) 2022    ALBUMIN 3.8 2022       Assessment and Plan:   Juvenal Blake is a 56 year old male PMH significant for refractory NHL, s/p  NK infusion. Admitted with rituxan (biosimilar) infusion reaction  and remained admitted through completion of lymphodepleting chemo and  NK cell infusion. Now presents for LD chemo prep for yescarta cells     1. Follicular Lymphoma:   Relapsed after BR, [facility], NAM NK and now . He has had idelalisib as a bridge and responded.    Yescarta:   Collection on .  S/p   Bridging: tod only (no rituxan due to reaction last cycle), tumor biopsy done 12/20.   LD Chemo daily 1/12-14  Start allopurinol 1/13    2. HEME: Keep hgb >7g/dL, plt>10,000.  Mild anemia and thrombocytopenia secondary to chemotherapy/lymphoma. No transfusions needed.     3 ID: afebrile, mild cough improved  Rhinovirus+10/6. CXR with mild bronchial thickening but no PNA. IgG low at 240, got IVIG last month. No real indication for continued IVIG replacement.    Prophy: acyclovir, fluconazole. [id number] daily to start when neutropenic.  Stop Bactrim; IH pentamidine 1/13.     Hx C diff colitis   S/p 3 covid shots; first two were pre- and booster was after.  Defer to car -t team regarding if revaccination needed post CART.     4. CV:    Cardiology cleared him to proceed with no further testing. The CT angiogram showed no lesion requiring stenting or bypass.    5. :   Post radical prostatectomy and bilateral lymph node dissection. November 15, 2017. Prostatic adenocarcinoma [place] 4+3 = 7 with tertiary pattern 5. Tumor involves 45% of total surface area. Positive for extensive extraprostatic extension. Positive for bilateral seminal vesicle invasion. Multiple margins positive. Lymphovascular invasion not identified. Perineural invasion was noted. Lymph nodes negative. 3 were examined (2 right obturator and 1 left obturator). Completed radiation to the prostate fossa and pelvic lymph nodes at [facility] early May 2018. He received 4500 cGy in 25 fractions. He then had 2340 cGy boost in 13 fractions to the prostatic fossa, for a total dose of 6240 cGy in 38 treatment fractions delivered over 54 days. He did not receive hormonal therapy:    9/10 PSA: 0.71 - no concerns.      6.FEN/Renal:   - Cr mildly elevated.  It is stable.   - Lytes stable.      7. GI:    Antiemetics as needed  #intermittent nausea -  medical cannibis.  #Abdominal Pain secondary to cancer. Not addressed 1/13.     8. Dental: s/p root  canal; well healed and mouth feels good.       Plan: given flu/cy today as planned  Pentamidine today  Med doses clarified.     RTC Fri for flu/cy  Monday PICC line placement, admission for yescarta    10 minutes was spent on this appointment which included patient exam, chart review, orders required and review, consult with ordering chemotherapy and change in treatment plan, as well as future therapy anticipated.    Penny Garcia PA-C  1/13/2022

## 2022-01-13 NOTE — LETTER
2022         RE: Juvenal Blake  [address] [place] [address]        Dear Colleague,    Thank you for referring your patient, Juvenal Blake, to the [facility] BLOOD AND MARROW TRANSPLANT PROGRAM [place]. Please see a copy of my visit note below.    BMT Clinic Note     Juvenal Blake is a 56 year old male PMH significant for refractory NHLS/p , bridging chemo with tod (without rituxan).  Now getting LD chemo in prep for Yescarta.      HPI: Juvenal was seen today mainly to manage medications.  He required refill of acyclovir; and he needed to confirm his levaquin dose.  He has both 750mg and 250mg levaquin strengths.    ROS: positive for nausea     Physical Exam:   /70 (BP Location: Right arm, Patient Position: Sitting, Cuff Size: Adult Large)   Pulse 66   Temp 98.4  F (36.9  C) (Tympanic)   Resp 16   Wt 108.2 kg (238 lb 8 oz)   SpO2 100%   BMI 35.20 kg/m      GA: NAD. Appears as stated age.  HEENT: PERRL   CV: well perfused  Lungs: no increased WOB; breathing comfortably on room air  Abd:  protuberant belly  Neuro: AAO, moving all ext. Symmetric face  Psyc: Appropriate and cooperative               ECO    Labs:  Lab Results   Component Value Date    WBC 3.6 (L) 2022    ANEU 0.6 (L) 2021    HGB 11.0 (L) 2022    HCT 32.2 (L) 2022     (L) 2022     2022    POTASSIUM 4.1 2022    CHLORIDE 110 (H) 2022    CO2 26 2022     (H) 2022    BUN 13 2022    CR 1.23 2022    MAG 2.1 2022    INR 1.01 2021    BILITOTAL 0.4 2022    AST 27 2022    ALT 80 (H) 2022    ALKPHOS 93 2022    PROTTOTAL 6.2 (L) 2022    ALBUMIN 3.8 2022       Assessment and Plan:   Juvenal Blake is a 56 year old male PMH significant for refractory NHL, s/p  NK infusion. Admitted with rituxan (biosimilar) infusion reaction  and remained admitted through completion of  lymphodepleting chemo and  NK cell infusion. Now presents for LD chemo prep for yescarta cells 1/17    1. Follicular Lymphoma:   Relapsed after BR, [facility], NAM NK and now . He has had idelalisib as a bridge and responded.    Yescarta:   Collection on 12/13.  S/p 12/21 Bridging: tod only (no rituxan due to reaction last cycle), tumor biopsy done 12/20.   LD Chemo daily 1/12-14  Start allopurinol 1/13    2. HEME: Keep hgb >7g/dL, plt>10,000.  Mild anemia and thrombocytopenia secondary to chemotherapy/lymphoma. No transfusions needed.     3 ID: afebrile, mild cough improved  Rhinovirus+10/6. CXR with mild bronchial thickening but no PNA. IgG low at 240, got IVIG last month. No real indication for continued IVIG replacement.    Prophy: acyclovir, fluconazole. [id number] daily to start when neutropenic.  Stop Bactrim; IH pentamidine 1/13.     Hx C diff colitis   S/p 3 covid shots; first two were pre- and booster was after.  Defer to car -t team regarding if revaccination needed post CART.     4. CV:    Cardiology cleared him to proceed with no further testing. The CT angiogram showed no lesion requiring stenting or bypass.    5. :   Post radical prostatectomy and bilateral lymph node dissection. November 15, 2017. Prostatic adenocarcinoma [place] 4+3 = 7 with tertiary pattern 5. Tumor involves 45% of total surface area. Positive for extensive extraprostatic extension. Positive for bilateral seminal vesicle invasion. Multiple margins positive. Lymphovascular invasion not identified. Perineural invasion was noted. Lymph nodes negative. 3 were examined (2 right obturator and 1 left obturator). Completed radiation to the prostate fossa and pelvic lymph nodes at [facility] early May 2018. He received 4500 cGy in 25 fractions. He then had 2340 cGy boost in 13 fractions to the prostatic fossa, for a total dose of 6240 cGy in 38 treatment fractions delivered over 54 days. He did not receive hormonal  therapy:    9/10 PSA: 0.71 - no concerns.      6.FEN/Renal:   - Cr mildly elevated.  It is stable.   - Lytes stable.      7. GI:    Antiemetics as needed  #intermittent nausea -  medical cannibis.  #Abdominal Pain secondary to cancer. Not addressed 1/13.     8. Dental: s/p root canal; well healed and mouth feels good.       Plan: given flu/cy today as planned  Pentamidine today  Med doses clarified.     RTC Fri for flu/cy  Monday PICC line placement, admission for yescarta    10 minutes was spent on this appointment which included patient exam, chart review, orders required and review, consult with ordering chemotherapy and change in treatment plan, as well as future therapy anticipated.    Penny Garcia PA-C  1/13/2022          Again, thank you for allowing me to participate in the care of your patient.      Sincerely,    BMT Advanced Practice Provider

## 2022-01-13 NOTE — LETTER
1/13/2022         RE: Juvenal Blake  [address] [place] [address]        Dear Colleague,    Thank you for referring your patient, Juvenal Blake, to the [facility] BLOOD AND MARROW TRANSPLANT PROGRAM [place]. Please see a copy of my visit note below.    Infusion Nursing Note:  Juvenal Blake presents today for Day 2, Cycle 1 of Flu/Cy prior to Yescarta.    Patient seen by provider today: Yes: Penny Carrier   present during visit today: Not Applicable.    Note: Lab results monitored; ok per Penny to proceed with today's infusion. NS continuous IVF started 1 hour prior to chemo at 250mL/hr. 1mg IVP Kytril given 30 minutes prior to chemo. Cytoxan checked by 2nd RN, infused over 1 hour. Fludarabine checked by 2nd RN, infused over 1 hour. IVF continued for 1 hour post chemo infusions. VSS throughout.    Intravenous Access:  Implanted Port.  +BR noted before and after chemo infusions    Treatment Conditions:  Not Applicable.    Post Infusion Assessment:  Patient tolerated infusion without incident.     Discharge Plan:   Patient discharged in stable condition accompanied by: self.      Apryl Vallejo, RN        Again, thank you for allowing me to participate in the care of your patient.      Sincerely,    [facility]

## 2022-01-14 ENCOUNTER — INFUSION THERAPY VISIT (OUTPATIENT)
Dept: TRANSPLANT | Facility: CLINIC | Age: 57
End: 2022-01-14
Payer: COMMERCIAL

## 2022-01-14 ENCOUNTER — ONCOLOGY VISIT (OUTPATIENT)
Dept: TRANSPLANT | Facility: CLINIC | Age: 57
End: 2022-01-14
Attending: PHYSICIAN ASSISTANT
Payer: COMMERCIAL

## 2022-01-14 ENCOUNTER — DOCUMENTATION ONLY (OUTPATIENT)
Dept: OTHER | Facility: CLINIC | Age: 57
End: 2022-01-14

## 2022-01-14 ENCOUNTER — CARE COORDINATION (OUTPATIENT)
Dept: TRANSPLANT | Facility: CLINIC | Age: 57
End: 2022-01-14

## 2022-01-14 ENCOUNTER — APPOINTMENT (OUTPATIENT)
Dept: LAB | Facility: CLINIC | Age: 57
End: 2022-01-14
Payer: COMMERCIAL

## 2022-01-14 VITALS
OXYGEN SATURATION: 100 % | SYSTOLIC BLOOD PRESSURE: 111 MMHG | WEIGHT: 239.4 LBS | BODY MASS INDEX: 35.34 KG/M2 | DIASTOLIC BLOOD PRESSURE: 63 MMHG | RESPIRATION RATE: 16 BRPM | TEMPERATURE: 98 F | HEART RATE: 63 BPM

## 2022-01-14 DIAGNOSIS — C82.08 FOLLICULAR LYMPHOMA GRADE I, LYMPH NODES OF MULTIPLE SITES (H): ICD-10-CM

## 2022-01-14 DIAGNOSIS — C82.08 FOLLICULAR LYMPHOMA GRADE I, LYMPH NODES OF MULTIPLE SITES (H): Primary | ICD-10-CM

## 2022-01-14 DIAGNOSIS — C82.00 FOLLICULAR LYMPHOMA GRADE I, UNSPECIFIED BODY REGION (H): ICD-10-CM

## 2022-01-14 DIAGNOSIS — C82.00 FOLLICULAR LYMPHOMA GRADE I, UNSPECIFIED BODY REGION (H): Primary | ICD-10-CM

## 2022-01-14 LAB
ALBUMIN SERPL-MCNC: 3.7 G/DL (ref 3.4–5)
ALP SERPL-CCNC: 92 U/L (ref 40–150)
ALT SERPL W P-5'-P-CCNC: 70 U/L (ref 0–70)
ANION GAP SERPL CALCULATED.3IONS-SCNC: 9 MMOL/L (ref 3–14)
AST SERPL W P-5'-P-CCNC: 24 U/L (ref 0–45)
BASOPHILS # BLD AUTO: 0 10E3/UL (ref 0–0.2)
BASOPHILS NFR BLD AUTO: 0 %
BILIRUB SERPL-MCNC: 0.4 MG/DL (ref 0.2–1.3)
BUN SERPL-MCNC: 11 MG/DL (ref 7–30)
CALCIUM SERPL-MCNC: 8.8 MG/DL (ref 8.5–10.1)
CHLORIDE BLD-SCNC: 107 MMOL/L (ref 94–109)
CO2 SERPL-SCNC: 26 MMOL/L (ref 20–32)
CREAT SERPL-MCNC: 1.11 MG/DL (ref 0.66–1.25)
EOSINOPHIL # BLD AUTO: 0.1 10E3/UL (ref 0–0.7)
EOSINOPHIL NFR BLD AUTO: 3 %
ERYTHROCYTE [DISTWIDTH] IN BLOOD BY AUTOMATED COUNT: 14.4 % (ref 10–15)
GFR SERPL CREATININE-BSD FRML MDRD: 77 ML/MIN/1.73M2
GLUCOSE BLD-MCNC: 126 MG/DL (ref 70–99)
HCT VFR BLD AUTO: 31.4 % (ref 40–53)
HGB BLD-MCNC: 10.8 G/DL (ref 13.3–17.7)
IMM GRANULOCYTES # BLD: 0 10E3/UL
IMM GRANULOCYTES NFR BLD: 1 %
LYMPHOCYTES # BLD AUTO: 0.1 10E3/UL (ref 0.8–5.3)
LYMPHOCYTES NFR BLD AUTO: 1 %
MAGNESIUM SERPL-MCNC: 2 MG/DL (ref 1.6–2.3)
MCH RBC QN AUTO: 33.6 PG (ref 26.5–33)
MCHC RBC AUTO-ENTMCNC: 34.4 G/DL (ref 31.5–36.5)
MCV RBC AUTO: 98 FL (ref 78–100)
MONOCYTES # BLD AUTO: 0.2 10E3/UL (ref 0–1.3)
MONOCYTES NFR BLD AUTO: 5 %
NEUTROPHILS # BLD AUTO: 3.2 10E3/UL (ref 1.6–8.3)
NEUTROPHILS NFR BLD AUTO: 90 %
NRBC # BLD AUTO: 0 10E3/UL
NRBC BLD AUTO-RTO: 0 /100
PHOSPHATE SERPL-MCNC: 2.4 MG/DL (ref 2.5–4.5)
PLATELET # BLD AUTO: 101 10E3/UL (ref 150–450)
POTASSIUM BLD-SCNC: 4.2 MMOL/L (ref 3.4–5.3)
PROT SERPL-MCNC: 6.2 G/DL (ref 6.8–8.8)
RBC # BLD AUTO: 3.21 10E6/UL (ref 4.4–5.9)
SARS-COV-2 RNA RESP QL NAA+PROBE: NEGATIVE
SODIUM SERPL-SCNC: 142 MMOL/L (ref 133–144)
URATE SERPL-MCNC: 4.2 MG/DL (ref 3.5–7.2)
WBC # BLD AUTO: 3.5 10E3/UL (ref 4–11)

## 2022-01-14 PROCEDURE — 82040 ASSAY OF SERUM ALBUMIN: CPT | Performed by: INTERNAL MEDICINE

## 2022-01-14 PROCEDURE — 258N000003 HC RX IP 258 OP 636: Performed by: INTERNAL MEDICINE

## 2022-01-14 PROCEDURE — G0463 HOSPITAL OUTPT CLINIC VISIT: HCPCS | Mod: 25

## 2022-01-14 PROCEDURE — 84550 ASSAY OF BLOOD/URIC ACID: CPT | Performed by: INTERNAL MEDICINE

## 2022-01-14 PROCEDURE — 250N000011 HC RX IP 250 OP 636: Performed by: INTERNAL MEDICINE

## 2022-01-14 PROCEDURE — 250N000011 HC RX IP 250 OP 636: Performed by: PHYSICIAN ASSISTANT

## 2022-01-14 PROCEDURE — 96375 TX/PRO/DX INJ NEW DRUG ADDON: CPT

## 2022-01-14 PROCEDURE — 96361 HYDRATE IV INFUSION ADD-ON: CPT

## 2022-01-14 PROCEDURE — 99214 OFFICE O/P EST MOD 30 MIN: CPT

## 2022-01-14 PROCEDURE — 36591 DRAW BLOOD OFF VENOUS DEVICE: CPT | Performed by: INTERNAL MEDICINE

## 2022-01-14 PROCEDURE — 83735 ASSAY OF MAGNESIUM: CPT | Performed by: INTERNAL MEDICINE

## 2022-01-14 PROCEDURE — 85025 COMPLETE CBC W/AUTO DIFF WBC: CPT

## 2022-01-14 PROCEDURE — 80053 COMPREHEN METABOLIC PANEL: CPT | Performed by: INTERNAL MEDICINE

## 2022-01-14 PROCEDURE — 96413 CHEMO IV INFUSION 1 HR: CPT

## 2022-01-14 PROCEDURE — 96417 CHEMO IV INFUS EACH ADDL SEQ: CPT

## 2022-01-14 PROCEDURE — U0005 INFEC AGEN DETEC AMPLI PROBE: HCPCS

## 2022-01-14 PROCEDURE — 84100 ASSAY OF PHOSPHORUS: CPT | Performed by: INTERNAL MEDICINE

## 2022-01-14 RX ORDER — SODIUM CHLORIDE 9 MG/ML
INJECTION, SOLUTION INTRAVENOUS CONTINUOUS
Status: DISCONTINUED | OUTPATIENT
Start: 2022-01-14 | End: 2022-01-14 | Stop reason: HOSPADM

## 2022-01-14 RX ORDER — GRANISETRON HYDROCHLORIDE 1 MG/ML
1 INJECTION INTRAVENOUS ONCE
Status: COMPLETED | OUTPATIENT
Start: 2022-01-14 | End: 2022-01-14

## 2022-01-14 RX ORDER — HEPARIN SODIUM (PORCINE) LOCK FLUSH IV SOLN 100 UNIT/ML 100 UNIT/ML
5 SOLUTION INTRAVENOUS ONCE
Status: COMPLETED | OUTPATIENT
Start: 2022-01-14 | End: 2022-01-14

## 2022-01-14 RX ADMIN — GRANISETRON HYDROCHLORIDE 1 MG: 1 INJECTION, SOLUTION INTRAVENOUS at 08:28

## 2022-01-14 RX ADMIN — FLUDARABINE PHOSPHATE 69 MG: 25 INJECTION, SOLUTION INTRAVENOUS at 10:31

## 2022-01-14 RX ADMIN — SODIUM CHLORIDE: 9 INJECTION, SOLUTION INTRAVENOUS at 08:28

## 2022-01-14 RX ADMIN — CYCLOPHOSPHAMIDE 1150 MG: 1 INJECTION, POWDER, FOR SOLUTION INTRAVENOUS; ORAL at 09:27

## 2022-01-14 RX ADMIN — Medication 5 ML: at 08:19

## 2022-01-14 ASSESSMENT — PAIN SCALES - GENERAL: PAINLEVEL: NO PAIN (1)

## 2022-01-14 NOTE — LETTER
1/14/2022         RE: Juvenal Blake  [address] [place] [address]        Dear Colleague,    Thank you for referring your patient, Juvenal Blake, to the [facility] BLOOD AND MARROW TRANSPLANT PROGRAM [place]. Please see a copy of my visit note below.    See nursing note    Apryl Vallejo, VALERIE              Again, thank you for allowing me to participate in the care of your patient.      Sincerely,    [facility]

## 2022-01-14 NOTE — PROGRESS NOTES
BMT Clinic Note     Juvenal Blake is a 56 year old male PMH significant for refractory NHLS/p , bridging chemo with tod (without rituxan).  Now getting LD chemo in prep for Yescarta.      HPI: Here for LD chemo. Plan for admission to  on Monday for Yescarta. Doing ok. Had some nausea this morning with near emesis, but didn't vomit. Took compazine at home before coming to clinic. Was able to eat an egg sandwich for breakfast. No fevers. Cough improving. No change in stools. No new pain or palpable nodes. Still has intermittent abdominal pain and inguinal node which feels the same size.    ROS: negative except as stated above in HPI     Physical Exam:   /63 (BP Location: Right arm, Patient Position: Sitting, Cuff Size: Adult Regular)   Pulse 63   Temp 98  F (36.7  C) (Oral)   Resp 16   Wt 108.6 kg (239 lb 6.4 oz)   SpO2 100%   BMI 35.34 kg/m    GA: NAD. Appears as stated age.  HEENT: PERRL   CV: well perfused  Lungs: no increased WOB; breathing comfortably on room air  Abd:  protuberant belly  Neuro: AAO, moving all ext. Symmetric face  Psyc: Appropriate and cooperative               ECO    Labs:  Lab Results   Component Value Date    WBC 3.5 (L) 2022    ANEU 0.6 (L) 2021    HGB 10.8 (L) 2022    HCT 31.4 (L) 2022     (L) 2022     2022    POTASSIUM 4.2 2022    CHLORIDE 107 2022    CO2 26 2022     (H) 2022    BUN 11 2022    CR 1.11 2022    MAG 2.0 2022    INR 1.01 2021    BILITOTAL 0.4 2022    AST 24 2022    ALT 70 2022    ALKPHOS 92 2022    PROTTOTAL 6.2 (L) 2022    ALBUMIN 3.7 2022       Assessment and Plan:   Juvenal Blake is a 56 year old male PMH significant for refractory NHL, s/p  NK infusion. Admitted with rituxan (biosimilar) infusion reaction  and remained admitted through completion of lymphodepleting chemo and  NK cell infusion.  Now presents for LD chemo prep for yescarta cells 1/17    1. Follicular Lymphoma:   Relapsed after BR, OCHOP, NAM NK and now . He has had idelalisib as a bridge and responded.    Yescarta:   Collection on 12/13.  S/p 12/21 Bridging: tod only (no rituxan due to reaction last cycle), tumor biopsy done 12/20.   LD Chemo daily 1/12-14, get cytoxan and fludarabine today  Start allopurinol 1/13    2. HEME: Keep hgb >7g/dL, plt>10,000.  Mild anemia and thrombocytopenia secondary to chemotherapy/lymphoma. No transfusions needed.     3 ID: afebrile, mild cough improved  Rhinovirus+10/6. CXR with mild bronchial thickening but no PNA. IgG low at 240, got IVIG last month. No real indication for continued IVIG replacement.    Prophy: acyclovir, fluconazole. Levaquin 250mg daily to start when neutropenic.  Stop Bactrim; IH pentamidine 1/13.     Hx C diff colitis   S/p 3 covid shots; first two were pre- and booster was after.  Defer to car -t team regarding if revaccination needed post CART.     4. CV:    Cardiology cleared him to proceed with no further testing. The CT angiogram showed no lesion requiring stenting or bypass.    5. :   Post radical prostatectomy and bilateral lymph node dissection. November 15, 2017. Prostatic adenocarcinoma [place] 4+3 = 7 with tertiary pattern 5. Tumor involves 45% of total surface area. Positive for extensive extraprostatic extension. Positive for bilateral seminal vesicle invasion. Multiple margins positive. Lymphovascular invasion not identified. Perineural invasion was noted. Lymph nodes negative. 3 were examined (2 right obturator and 1 left obturator). Completed radiation to the prostate fossa and pelvic lymph nodes at [facility] early May 2018. He received 4500 cGy in 25 fractions. He then had 2340 cGy boost in 13 fractions to the prostatic fossa, for a total dose of 6240 cGy in 38 treatment fractions delivered over 54 days. He did not receive hormonal therapy:     9/10 PSA: 0.71 - no concerns.      6.FEN/Renal:   - Cr mildly elevated.  It is stable.   - Lytes stable.      7. GI:    Antiemetics as needed  #intermittent nausea -  medical cannibis.  #Abdominal Pain secondary to cancer. Not addressed 1/13.     8. Dental: s/p root canal; well healed and mouth feels good.     Plan: given flu/cy today as planned    RTC Monday PICC line placement (scheduled at the hospital), admission for yescarta    30 minutes was spent on this appointment which included patient exam, chart review, orders required and review, consult with ordering chemotherapy and change in treatment plan, as well as future therapy anticipated.    Irma Beteha PA-C  [phone]

## 2022-01-14 NOTE — PROGRESS NOTES
Inpatient Admission Information:      Admit Date:  1/17/22   Diagnosis:  R/R FL   Transplant Type:  Yescarta   Protocol:  [id number] Arm B      Notes:         New Eval Work-Up   MD Mara RANDALL  Hoang   Date 12/3/21 1/4/22         Consult Type Date   1     2     3           Long Term Follow-Up   MD Mara Mckinley

## 2022-01-14 NOTE — LETTER
2022         RE: Juvenal Blake  [address] [place] [address]        Dear Colleague,    Thank you for referring your patient, Juvenal Blake, to the [facility] BLOOD AND MARROW TRANSPLANT PROGRAM [place]. Please see a copy of my visit note below.  BMT Clinic Note     Juvenal Blake is a 56 year old male PMH significant for refractory NHLS/p , bridging chemo with tod (without rituxan).  Now getting LD chemo in prep for Yescarta.      HPI: Here for LD chemo. Plan for admission to  on Monday for Yescarta. Doing ok. Had some nausea this morning with near emesis, but didn't vomit. Took compazine at home before coming to clinic. Was able to eat an egg sandwich for breakfast. No fevers. Cough improving. No change in stools. No new pain or palpable nodes. Still has intermittent abdominal pain and inguinal node which feels the same size.    ROS: negative except as stated above in HPI     Physical Exam:   /63 (BP Location: Right arm, Patient Position: Sitting, Cuff Size: Adult Regular)   Pulse 63   Temp 98  F (36.7  C) (Oral)   Resp 16   Wt 108.6 kg (239 lb 6.4 oz)   SpO2 100%   BMI 35.34 kg/m    GA: NAD. Appears as stated age.  HEENT: PERRL   CV: well perfused  Lungs: no increased WOB; breathing comfortably on room air  Abd:  protuberant belly  Neuro: AAO, moving all ext. Symmetric face  Psyc: Appropriate and cooperative               ECO    Labs:  Lab Results   Component Value Date    WBC 3.5 (L) 2022    ANEU 0.6 (L) 2021    HGB 10.8 (L) 2022    HCT 31.4 (L) 2022     (L) 2022     2022    POTASSIUM 4.2 2022    CHLORIDE 107 2022    CO2 26 2022     (H) 2022    BUN 11 2022    CR 1.11 2022    MAG 2.0 2022    INR 1.01 2021    BILITOTAL 0.4 2022    AST 24 2022    ALT 70 2022    ALKPHOS 92 2022    PROTTOTAL 6.2 (L) 2022    ALBUMIN 3.7 2022      Assessment and Plan:   Juvenal Blake is a 56 year old male PMH significant for refractory NHL, s/p  NK infusion. Admitted with rituxan (biosimilar) infusion reaction 6/24 and remained admitted through completion of lymphodepleting chemo and  NK cell infusion. Now presents for LD chemo prep for yescarta cells 1/17    1. Follicular Lymphoma:   Relapsed after BR, OCH[facility], NAM NK and now . He has had idelalisib as a bridge and responded.    Yescarta:   Collection on 12/13.  S/p 12/21 Bridging: tod only (no rituxan due to reaction last cycle), tumor biopsy done 12/20.   LD Chemo daily 1/12-14, get cytoxan and fludarabine today  Start allopurinol 1/13    2. HEME: Keep hgb >7g/dL, plt>10,000.  Mild anemia and thrombocytopenia secondary to chemotherapy/lymphoma. No transfusions needed.     3 ID: afebrile, mild cough improved  Rhinovirus+10/6. CXR with mild bronchial thickening but no PNA. IgG low at 240, got IVIG last month. No real indication for continued IVIG replacement.    Prophy: acyclovir, fluconazole. Levaquin 250mg daily to start when neutropenic.  Stop Bactrim; IH pentamidine 1/13.     Hx C diff colitis   S/p 3 covid shots; first two were pre- and booster was after.  Defer to car -t team regarding if revaccination needed post CART.     4. CV:    Cardiology cleared him to proceed with no further testing. The CT angiogram showed no lesion requiring stenting or bypass.    5. :   Post radical prostatectomy and bilateral lymph node dissection. November 15, 2017. Prostatic adenocarcinoma [place] 4+3 = 7 with tertiary pattern 5. Tumor involves 45% of total surface area. Positive for extensive extraprostatic extension. Positive for bilateral seminal vesicle invasion. Multiple margins positive. Lymphovascular invasion not identified. Perineural invasion was noted. Lymph nodes negative. 3 were examined (2 right obturator and 1 left obturator). Completed radiation to the prostate fossa and pelvic  lymph nodes at [facility] early May 2018. He received 4500 cGy in 25 fractions. He then had 2340 cGy boost in 13 fractions to the prostatic fossa, for a total dose of 6240 cGy in 38 treatment fractions delivered over 54 days. He did not receive hormonal therapy:    9/10 PSA: 0.71 - no concerns.      6.FEN/Renal:   - Cr mildly elevated.  It is stable.   - Lytes stable.      7. GI:    Antiemetics as needed  #intermittent nausea -  medical cannibis.  #Abdominal Pain secondary to cancer. Not addressed 1/13.     8. Dental: s/p root canal; well healed and mouth feels good.     Plan: given flu/cy today as planned    RTC Monday PICC line placement (scheduled at the hospital), admission for yescarta    30 minutes was spent on this appointment which included patient exam, chart review, orders required and review, consult with ordering chemotherapy and change in treatment plan, as well as future therapy anticipated.    Irma Bethea PA-C  [phone]      Again, thank you for allowing me to participate in the care of your patient.      Sincerely,    BMT Advanced Practice Provider

## 2022-01-14 NOTE — NURSING NOTE
Infusion Nursing Note:  Juvenal Blake presents today for Day 3, Cycle 1 Flu/Cy prior to Yescarta.    Patient seen by provider today: Yes: Irma Sneed   present during visit today: Not Applicable.    Note: Lab results monitored; ok per Irma to proceed with today's infusion. NS continuous IVF started 1 hour prior to chemo at 250mL/hr. 1mg IVP Kytril given 30 minutes prior to chemo. Cytoxan checked by 2nd RN, infused over 1 hour. Fludarabine checked by 2nd RN, infused over 1 hour. IVF continued for 1 hour post cytoxan infusion. VSS throughout.    Intravenous Access:  Implanted Port.  +BR noted pre and post infusions, de-accessed prior to discharge.    Treatment Conditions:  Not Applicable.      Post Infusion Assessment:  Patient tolerated infusion without incident.       Discharge Plan:   Patient discharged in stable condition accompanied by: wife.      Apryl Vallejo, RN

## 2022-01-17 ENCOUNTER — HOSPITAL ENCOUNTER (INPATIENT)
Facility: CLINIC | Age: 57
LOS: 7 days | Discharge: HOME | DRG: 018 | End: 2022-01-24
Attending: INTERNAL MEDICINE | Admitting: INTERNAL MEDICINE
Payer: COMMERCIAL

## 2022-01-17 ENCOUNTER — HOSPITAL ENCOUNTER (OUTPATIENT)
Dept: VASCULAR ULTRASOUND | Facility: CLINIC | Age: 57
DRG: 018 | End: 2022-01-17
Payer: COMMERCIAL

## 2022-01-17 DIAGNOSIS — C82.08 FOLLICULAR LYMPHOMA GRADE I, LYMPH NODES OF MULTIPLE SITES (H): ICD-10-CM

## 2022-01-17 DIAGNOSIS — C82.90 FOLLICULAR LYMPHOMA (H): ICD-10-CM

## 2022-01-17 DIAGNOSIS — C82.00 FOLLICULAR LYMPHOMA GRADE I, UNSPECIFIED BODY REGION (H): Primary | ICD-10-CM

## 2022-01-17 PROBLEM — C83.30 DLBCL (DIFFUSE LARGE B CELL LYMPHOMA) (H): Status: ACTIVE | Noted: 2022-01-17

## 2022-01-17 LAB
ABO/RH(D): NORMAL
ALBUMIN SERPL-MCNC: 3.5 G/DL (ref 3.4–5)
ALP SERPL-CCNC: 90 U/L (ref 40–150)
ALT SERPL W P-5'-P-CCNC: 44 U/L (ref 0–70)
ANION GAP SERPL CALCULATED.3IONS-SCNC: 8 MMOL/L (ref 3–14)
ANTIBODY SCREEN: NEGATIVE
APTT PPP: 33 SECONDS (ref 22–38)
AST SERPL W P-5'-P-CCNC: 23 U/L (ref 0–45)
BASOPHILS # BLD AUTO: 0 10E3/UL (ref 0–0.2)
BASOPHILS NFR BLD AUTO: 0 %
BILIRUB SERPL-MCNC: 0.6 MG/DL (ref 0.2–1.3)
BUN SERPL-MCNC: 16 MG/DL (ref 7–30)
C DIFF TOX B STL QL: POSITIVE
CALCIUM SERPL-MCNC: 9.3 MG/DL (ref 8.5–10.1)
CD19 CELLS # BLD: <1 CELLS/UL (ref 107–698)
CD19 CELLS NFR BLD: <1 % (ref 6–27)
CD3 CELLS # BLD: 28 CELLS/UL (ref 603–2990)
CD3 CELLS NFR BLD: 89 % (ref 49–84)
CD3+CD4+ CELLS # BLD: 17 CELLS/UL (ref 441–2156)
CD3+CD4+ CELLS NFR BLD: 54 % (ref 28–63)
CD3+CD4+ CELLS/CD3+CD8+ CLL BLD: 1.48 % (ref 1.4–2.6)
CD3+CD8+ CELLS # BLD: 12 CELLS/UL (ref 125–1312)
CD3+CD8+ CELLS NFR BLD: 37 % (ref 10–40)
CD3-CD16+CD56+ CELLS # BLD: 2 CELLS/UL (ref 95–640)
CD3-CD16+CD56+ CELLS NFR BLD: 8 % (ref 4–25)
CHLORIDE BLD-SCNC: 106 MMOL/L (ref 94–109)
CO2 SERPL-SCNC: 25 MMOL/L (ref 20–32)
CREAT SERPL-MCNC: 0.97 MG/DL (ref 0.66–1.25)
CRP SERPL-MCNC: 14 MG/L (ref 0–8)
D DIMER PPP FEU-MCNC: 0.49 UG/ML FEU (ref 0–0.5)
EOSINOPHIL # BLD AUTO: 0 10E3/UL (ref 0–0.7)
EOSINOPHIL NFR BLD AUTO: 3 %
ERYTHROCYTE [DISTWIDTH] IN BLOOD BY AUTOMATED COUNT: 13.5 % (ref 10–15)
FERRITIN SERPL-MCNC: 177 NG/ML (ref 26–388)
FIBRINOGEN PPP-MCNC: 453 MG/DL (ref 170–490)
GFR SERPL CREATININE-BSD FRML MDRD: 90 ML/MIN/1.73M2
GLUCOSE BLD-MCNC: 124 MG/DL (ref 70–99)
HCT VFR BLD AUTO: 28.5 % (ref 40–53)
HGB BLD-MCNC: 9.7 G/DL (ref 13.3–17.7)
IMM GRANULOCYTES # BLD: 0 10E3/UL
IMM GRANULOCYTES NFR BLD: 1 %
INR PPP: 1.03 (ref 0.85–1.15)
LDH SERPL L TO P-CCNC: 177 U/L (ref 85–227)
LYMPHOCYTES # BLD AUTO: 0 10E3/UL (ref 0.8–5.3)
LYMPHOCYTES NFR BLD AUTO: 2 %
MAGNESIUM SERPL-MCNC: 2.2 MG/DL (ref 1.6–2.3)
MCH RBC QN AUTO: 33.2 PG (ref 26.5–33)
MCHC RBC AUTO-ENTMCNC: 34 G/DL (ref 31.5–36.5)
MCV RBC AUTO: 98 FL (ref 78–100)
MONOCYTES # BLD AUTO: 0.1 10E3/UL (ref 0–1.3)
MONOCYTES NFR BLD AUTO: 4 %
NEUTROPHILS # BLD AUTO: 1.2 10E3/UL (ref 1.6–8.3)
NEUTROPHILS NFR BLD AUTO: 90 %
NRBC # BLD AUTO: 0 10E3/UL
NRBC BLD AUTO-RTO: 0 /100
PHOSPHATE SERPL-MCNC: 3.1 MG/DL (ref 2.5–4.5)
PLATELET # BLD AUTO: 85 10E3/UL (ref 150–450)
POTASSIUM BLD-SCNC: 4 MMOL/L (ref 3.4–5.3)
PROT SERPL-MCNC: 6.6 G/DL (ref 6.8–8.8)
RBC # BLD AUTO: 2.92 10E6/UL (ref 4.4–5.9)
SODIUM SERPL-SCNC: 139 MMOL/L (ref 133–144)
SPECIMEN EXPIRATION DATE: NORMAL
T CELL EXTENDED COMMENT: ABNORMAL
URATE SERPL-MCNC: 3.3 MG/DL (ref 3.5–7.2)
WBC # BLD AUTO: 1.3 10E3/UL (ref 4–11)

## 2022-01-17 PROCEDURE — 272N000451 HC KIT SHRLOCK 5FR POWER PICC DOUBLE LUMEN

## 2022-01-17 PROCEDURE — 250N000011 HC RX IP 250 OP 636: Performed by: INTERNAL MEDICINE

## 2022-01-17 PROCEDURE — 258N000001 HC RX 258: Performed by: PHYSICIAN ASSISTANT

## 2022-01-17 PROCEDURE — 272N000019 HC KIT OPEN ENDED DOUBLE LUMEN

## 2022-01-17 PROCEDURE — 85610 PROTHROMBIN TIME: CPT | Performed by: PHYSICIAN ASSISTANT

## 2022-01-17 PROCEDURE — 85379 FIBRIN DEGRADATION QUANT: CPT | Performed by: PHYSICIAN ASSISTANT

## 2022-01-17 PROCEDURE — 86355 B CELLS TOTAL COUNT: CPT | Performed by: PHYSICIAN ASSISTANT

## 2022-01-17 PROCEDURE — 36569 INSJ PICC 5 YR+ W/O IMAGING: CPT

## 2022-01-17 PROCEDURE — 87081 CULTURE SCREEN ONLY: CPT | Performed by: PHYSICIAN ASSISTANT

## 2022-01-17 PROCEDURE — 85025 COMPLETE CBC W/AUTO DIFF WBC: CPT | Performed by: PHYSICIAN ASSISTANT

## 2022-01-17 PROCEDURE — 86901 BLOOD TYPING SEROLOGIC RH(D): CPT | Performed by: PHYSICIAN ASSISTANT

## 2022-01-17 PROCEDURE — 84550 ASSAY OF BLOOD/URIC ACID: CPT | Performed by: PHYSICIAN ASSISTANT

## 2022-01-17 PROCEDURE — 85730 THROMBOPLASTIN TIME PARTIAL: CPT | Performed by: PHYSICIAN ASSISTANT

## 2022-01-17 PROCEDURE — 82040 ASSAY OF SERUM ALBUMIN: CPT | Performed by: PHYSICIAN ASSISTANT

## 2022-01-17 PROCEDURE — 272N000103 HC INTRODUCER MICRO SET

## 2022-01-17 PROCEDURE — 87493 C DIFF AMPLIFIED PROBE: CPT | Performed by: PHYSICIAN ASSISTANT

## 2022-01-17 PROCEDURE — 86140 C-REACTIVE PROTEIN: CPT | Performed by: PHYSICIAN ASSISTANT

## 2022-01-17 PROCEDURE — XW043H7 INTRODUCTION OF AXICABTAGENE CILOLEUCEL IMMUNOTHERAPY INTO CENTRAL VEIN, PERCUTANEOUS APPROACH, NEW TECHNOLOGY GROUP 7: ICD-10-PCS | Performed by: INTERNAL MEDICINE

## 2022-01-17 PROCEDURE — 83615 LACTATE (LD) (LDH) ENZYME: CPT | Performed by: PHYSICIAN ASSISTANT

## 2022-01-17 PROCEDURE — 120N000005 HC R&B MS OVERFLOW UMMC

## 2022-01-17 PROCEDURE — 82784 ASSAY IGA/IGD/IGG/IGM EACH: CPT | Performed by: PHYSICIAN ASSISTANT

## 2022-01-17 PROCEDURE — 99223 1ST HOSP IP/OBS HIGH 75: CPT | Mod: AI | Performed by: INTERNAL MEDICINE

## 2022-01-17 PROCEDURE — 83735 ASSAY OF MAGNESIUM: CPT | Performed by: PHYSICIAN ASSISTANT

## 2022-01-17 PROCEDURE — 82728 ASSAY OF FERRITIN: CPT | Performed by: PHYSICIAN ASSISTANT

## 2022-01-17 PROCEDURE — 272N000201 ZZ HC ADHESIVE SKIN CLOSURE, DERMABOND

## 2022-01-17 PROCEDURE — 85384 FIBRINOGEN ACTIVITY: CPT | Performed by: PHYSICIAN ASSISTANT

## 2022-01-17 PROCEDURE — 250N000013 HC RX MED GY IP 250 OP 250 PS 637: Performed by: PHYSICIAN ASSISTANT

## 2022-01-17 PROCEDURE — 84100 ASSAY OF PHOSPHORUS: CPT | Performed by: PHYSICIAN ASSISTANT

## 2022-01-17 PROCEDURE — 80053 COMPREHEN METABOLIC PANEL: CPT | Performed by: PHYSICIAN ASSISTANT

## 2022-01-17 RX ORDER — HEPARIN SODIUM,PORCINE 10 UNIT/ML
2-4 VIAL (ML) INTRAVENOUS EVERY 24 HOURS
Status: DISCONTINUED | OUTPATIENT
Start: 2022-01-17 | End: 2022-01-24 | Stop reason: HOSPADM

## 2022-01-17 RX ORDER — LIDOCAINE 40 MG/G
CREAM TOPICAL
Status: DISCONTINUED | OUTPATIENT
Start: 2022-01-17 | End: 2022-01-24 | Stop reason: HOSPADM

## 2022-01-17 RX ORDER — NALOXONE HYDROCHLORIDE 0.4 MG/ML
0.2 INJECTION, SOLUTION INTRAMUSCULAR; INTRAVENOUS; SUBCUTANEOUS
Status: DISCONTINUED | OUTPATIENT
Start: 2022-01-17 | End: 2022-01-24 | Stop reason: HOSPADM

## 2022-01-17 RX ORDER — HEPARIN SODIUM,PORCINE 10 UNIT/ML
2-4 VIAL (ML) INTRAVENOUS
Status: DISCONTINUED | OUTPATIENT
Start: 2022-01-17 | End: 2022-01-24 | Stop reason: HOSPADM

## 2022-01-17 RX ORDER — TOLTERODINE 4 MG/1
4 CAPSULE, EXTENDED RELEASE ORAL DAILY
Status: DISCONTINUED | OUTPATIENT
Start: 2022-01-17 | End: 2022-01-24 | Stop reason: HOSPADM

## 2022-01-17 RX ORDER — ALLOPURINOL 300 MG/1
300 TABLET ORAL DAILY
Status: DISCONTINUED | OUTPATIENT
Start: 2022-01-18 | End: 2022-01-24 | Stop reason: HOSPADM

## 2022-01-17 RX ORDER — LEVOFLOXACIN 250 MG/1
250 TABLET, FILM COATED ORAL DAILY
Status: DISCONTINUED | OUTPATIENT
Start: 2022-01-17 | End: 2022-01-24 | Stop reason: HOSPADM

## 2022-01-17 RX ORDER — NALOXONE HYDROCHLORIDE 0.4 MG/ML
0.4 INJECTION, SOLUTION INTRAMUSCULAR; INTRAVENOUS; SUBCUTANEOUS
Status: DISCONTINUED | OUTPATIENT
Start: 2022-01-17 | End: 2022-01-24 | Stop reason: HOSPADM

## 2022-01-17 RX ORDER — PROCHLORPERAZINE MALEATE 5 MG
5-10 TABLET ORAL EVERY 6 HOURS PRN
Status: DISCONTINUED | OUTPATIENT
Start: 2022-01-17 | End: 2022-01-18

## 2022-01-17 RX ORDER — ACETAMINOPHEN 325 MG/1
325-650 TABLET ORAL EVERY 4 HOURS PRN
Status: DISCONTINUED | OUTPATIENT
Start: 2022-01-17 | End: 2022-01-24 | Stop reason: HOSPADM

## 2022-01-17 RX ORDER — ACYCLOVIR 800 MG/1
800 TABLET ORAL EVERY 12 HOURS
Status: DISCONTINUED | OUTPATIENT
Start: 2022-01-17 | End: 2022-01-24 | Stop reason: HOSPADM

## 2022-01-17 RX ORDER — DIPHENHYDRAMINE HCL 25 MG
50 CAPSULE ORAL ONCE
Status: COMPLETED | OUTPATIENT
Start: 2022-01-17 | End: 2022-01-17

## 2022-01-17 RX ORDER — FLUCONAZOLE 100 MG/1
100 TABLET ORAL DAILY
Status: DISCONTINUED | OUTPATIENT
Start: 2022-01-17 | End: 2022-01-24 | Stop reason: HOSPADM

## 2022-01-17 RX ORDER — OXYCODONE HYDROCHLORIDE 5 MG/1
5 TABLET ORAL EVERY 4 HOURS PRN
Status: DISCONTINUED | OUTPATIENT
Start: 2022-01-17 | End: 2022-01-24 | Stop reason: HOSPADM

## 2022-01-17 RX ORDER — ALBUTEROL SULFATE 90 UG/1
2 AEROSOL, METERED RESPIRATORY (INHALATION) EVERY 4 HOURS PRN
Status: DISCONTINUED | OUTPATIENT
Start: 2022-01-17 | End: 2022-01-24 | Stop reason: HOSPADM

## 2022-01-17 RX ORDER — LORAZEPAM 0.5 MG/1
.5-1 TABLET ORAL EVERY 4 HOURS PRN
Status: DISCONTINUED | OUTPATIENT
Start: 2022-01-17 | End: 2022-01-24 | Stop reason: HOSPADM

## 2022-01-17 RX ORDER — ACETAMINOPHEN 325 MG/1
650 TABLET ORAL ONCE
Status: COMPLETED | OUTPATIENT
Start: 2022-01-17 | End: 2022-01-17

## 2022-01-17 RX ORDER — LORAZEPAM 2 MG/ML
.5-1 INJECTION INTRAMUSCULAR EVERY 4 HOURS PRN
Status: DISCONTINUED | OUTPATIENT
Start: 2022-01-17 | End: 2022-01-24 | Stop reason: HOSPADM

## 2022-01-17 RX ADMIN — ACETAMINOPHEN 650 MG: 325 TABLET, FILM COATED ORAL at 21:19

## 2022-01-17 RX ADMIN — Medication 5 ML: at 15:10

## 2022-01-17 RX ADMIN — Medication 4 ML: at 17:48

## 2022-01-17 RX ADMIN — ACETAMINOPHEN 650 MG: 325 TABLET, FILM COATED ORAL at 13:33

## 2022-01-17 RX ADMIN — DEXTROSE AND SODIUM CHLORIDE: 5; 450 INJECTION, SOLUTION INTRAVENOUS at 11:44

## 2022-01-17 RX ADMIN — LEVOFLOXACIN 250 MG: 250 TABLET, FILM COATED ORAL at 16:57

## 2022-01-17 RX ADMIN — OXYCODONE HYDROCHLORIDE 5 MG: 5 TABLET ORAL at 15:21

## 2022-01-17 RX ADMIN — DIPHENHYDRAMINE HYDROCHLORIDE 50 MG: 25 CAPSULE ORAL at 13:33

## 2022-01-17 RX ADMIN — OMEPRAZOLE 20 MG: 20 CAPSULE, DELAYED RELEASE ORAL at 16:57

## 2022-01-17 RX ADMIN — AXICABTAGENE CILOLEUCEL 1 DOSE: 2000000 SUSPENSION INTRAVENOUS at 14:36

## 2022-01-17 ASSESSMENT — ACTIVITIES OF DAILY LIVING (ADL)
ADLS_ACUITY_SCORE: 12
DRESSING/BATHING_DIFFICULTY: NO
ADLS_ACUITY_SCORE: 4
TOILETING_ISSUES: NO
FALL_HISTORY_WITHIN_LAST_SIX_MONTHS: NO
WEAR_GLASSES_OR_BLIND: YES
DIFFICULTY_COMMUNICATING: NO
ADLS_ACUITY_SCORE: 4
EQUIPMENT_CURRENTLY_USED_AT_HOME: OTHER (SEE COMMENTS)
ADLS_ACUITY_SCORE: 4
CONCENTRATING,_REMEMBERING_OR_MAKING_DECISIONS_DIFFICULTY: NO
ADLS_ACUITY_SCORE: 4
ADLS_ACUITY_SCORE: 4
DOING_ERRANDS_INDEPENDENTLY_DIFFICULTY: NO
HEARING_DIFFICULTY_OR_DEAF: NO
PATIENT_/_FAMILY_COMMUNICATION_STYLE: SPOKEN LANGUAGE (ENGLISH OR BILINGUAL)
ADLS_ACUITY_SCORE: 4
WALKING_OR_CLIMBING_STAIRS_DIFFICULTY: NO
ADLS_ACUITY_SCORE: 4
ADLS_ACUITY_SCORE: 4
DIFFICULTY_EATING/SWALLOWING: NO
ADLS_ACUITY_SCORE: 4
ADLS_ACUITY_SCORE: 4

## 2022-01-17 ASSESSMENT — MIFFLIN-ST. JEOR: SCORE: 1813.82

## 2022-01-17 NOTE — PROGRESS NOTES
CLINICAL NUTRITION SERVICES - ASSESSMENT NOTE     Nutrition Prescription    RECOMMENDATIONS FOR MDs/PROVIDERS TO ORDER:  Encourage oral intake    Malnutrition Status:    Severe malnutrition in the context of acute on chronic illness     Recommendations already ordered by Registered Dietitian (RD):  None at this time- continue supplements PRN   - provided patient with supplement list and 5C menu hacks sheet    Future/Additional Recommendations:  Monitor tolerance of oral intake- use of supplements, appetite   Monitor weight- ability to maintain     If patient continues to lose weight or is not able to consume adequate intake, please consult RD for additional nutrition support      REASON FOR ASSESSMENT  Juvenal Blake is a/an 56 year old male assessed by the dietitian for Admission Nutrition Risk Screen for positive (weight loss of 2-13 lbs and poor appetite)     CLINICAL HISTORY  relapsed/refractory NHL. Most recent relapse ([place]) confirmed on 12/20 on biopsy of a node. He has been treated so far with BR, [facility], [facility] NK (9/1/20),  (6/28/21), idelalisib, and Mike. Recent URI + parainfluenza (12/23/21; neg RVP 1/17/22). He is now proceeding with Yescarta CAR-T therapy. Received fludarabine/Cytoxan x3 days last week (outpatient). Admission today for cells and will remain admitted at least 7 days for close monitoring of CRS, neurotoxicity.    NUTRITION HISTORY  Juvenal reported he has lost weight since starting treatment in June, was previously about 250 lbs in June and now weighs about 220 lbs. He has noticed a significant change in appetite recently, can only eat about 50-75% of normal. He tries to eat 3 meals per day, does not snack much. Denied taste changes, chewing/swallowing issues, constipation and diarrhea.     CURRENT NUTRITION ORDERS  Diet: High Kcal/High Protein  + Supplements between meals (PRN)  Intake/Tolerance: 100%     LABS  Reviewed     MEDICATIONS  Levaquin  Prilosec    ANTHROPOMETRICS  Height: 171.5  "cm (5' 7.52\")  Most Recent Weight: 102.7 kg (226 lb 6.4 oz)    IBW: 67.3 kg  BMI: Obesity Grade I BMI 30-34.9  Weight History:   Wt Readings from Last 15 Encounters:   01/17/22 102.7 kg (226 lb 6.4 oz)   01/14/22 108.6 kg (239 lb 6.4 oz)   01/13/22 108.2 kg (238 lb 8 oz)   01/12/22 108.4 kg (239 lb)   01/04/22 108.6 kg (239 lb 6.4 oz)   12/29/21 106.6 kg (235 lb 1.6 oz)   12/23/21 106.1 kg (233 lb 12.8 oz)   12/21/21 107.9 kg (237 lb 12.8 oz)   12/20/21 106.6 kg (235 lb)   12/13/21 105.2 kg (231 lb 14.8 oz)   12/07/21 110.8 kg (244 lb 4.3 oz)   12/07/21 110.8 kg (244 lb 3.2 oz)   12/06/21 110.6 kg (243 lb 13.3 oz)   11/29/21 110.6 kg (243 lb 14.4 oz)   11/24/21 108 kg (238 lb 3.2 oz)     Dosing Weight: 76.1 kg (adjusted based on weight 102.7 kg and IBW)     ASSESSED NUTRITION NEEDS  Estimated Energy Needs:5348-9192 kcals/day (25 - 30 kcals/kg)  Justification: Maintenance  Estimated Protein Needs: 114 grams protein/day (1.5 g/kg)  Justification: Increased needs  Estimated Fluid Needs: 4919-4509 mL/day (1 mL/kcal)   Justification: Maintenance    PHYSICAL FINDINGS  See malnutrition section below.    MALNUTRITION  % Intake: </=75% for >/= 1 month (severe)  % Weight Loss: > 5% in 1 month (severe)  Subcutaneous Fat Loss: Facial region:  mild  Muscle Loss: None observed  Fluid Accumulation/Edema: None noted  Malnutrition Diagnosis: Severe malnutrition in the context of acute on chronic illness     NUTRITION DIAGNOSIS  Inadequate oral intake related to decreased appetite and increased metabolic demand 2/2 chronic illness as evidenced by patient report and >5% weight loss in one month     INTERVENTIONS  Implementation  Nutrition Education: RD role in care, importance of adequate calorie intake to maintain weight     Collaboration with other providers     Goals  Patient to consume % of nutritionally adequate meal trays TID, or the equivalent with supplements/snacks.     Monitoring/Evaluation  Progress toward goals will " be monitored and evaluated per protocol.    Yvonne Bangura RD, LD  5C/BMT pager: [phone]

## 2022-01-17 NOTE — PROCEDURES
[facility]    Double Lumen PICC Placement    Date/Time: 1/17/2022 10:45 AM  Performed by: Nayan Kasper RN  Authorized by: Rosa Terry MD   Indications: vascular access      UNIVERSAL PROTOCOL   Site Marked: Yes  Prior Images Obtained and Reviewed:  Yes  Required items: Required blood products, implants, devices and special equipment available    Patient identity confirmed:  Verbally with patient and arm band  NA - No sedation, light sedation, or local anesthesia  Confirmation Checklist:  Patient's identity using two indicators, relevant allergies, procedure was appropriate and matched the consent or emergent situation and correct equipment/implants were available  Time out: Immediately prior to the procedure a time out was called    Universal Protocol: the Joint Commission Universal Protocol was followed    Preparation: Patient was prepped and draped in usual sterile fashion       ANESTHESIA    Anesthesia: Local infiltration  Local Anesthetic:  Lidocaine 1% without epinephrine  Anesthetic Total (mL):  5      SEDATION    Patient Sedated: No        Preparation: skin prepped with ChloraPrep  Skin prep agent: skin prep agent completely dried prior to procedure  Sterile barriers: maximum sterile barriers were used: cap, mask, sterile gown, sterile gloves, and large sterile sheet  Hand hygiene: hand hygiene performed prior to central venous catheter insertion  Type of line used: PICC and Power PICC  Catheter type: double lumen  Lumen type: non-valved  Catheter size: 5 Fr  Brand: Bard  Lot number: [id number]  Placement method: venipuncture, MST, ultrasound and tip confirmation system  Number of attempts: 2  Successful placement: yes  Orientation: left  Location: cephalic vein (vd 0.42 cm)  Arm circumference: adults 10 cm  Extremity circumference: 31  Visible catheter length: 1  Total catheter length: 46  Dressing and securement: blood cleaned with CHG,  chlorhexidine disc applied, dressing applied, glue, line secured, securement device, site cleaned, statlock and sterile dressing applied  Post procedure assessment: free fluid flow and blood return through all ports (3cg technology)  PROCEDURE   Patient Tolerance:  Patient tolerated the procedure well with no immediate complicationsDescribe Procedure: PICC ok to use

## 2022-01-17 NOTE — PHARMACY-CONSULT NOTE
Juvenal Blake is a 56 year old year old male that will be undergoing CAR-T cell therapy for the treatment of lymphoma .    CAR-T cell product:  Yescarta (axicabtagene ciloleucel)  Anticipated date of infusion:  1/17/2022    CAR-T cell therapy is associated with a high incidence of cytokine release syndrome (CRS), which can be very severe.  Tocilizumab is an IL-6 receptor antagonist that is commonly utilized to treat CRS.  I have verified that a minimum of 2 patient specific doses are on site in the inpatient pharmacy and available for use for Juvenal Blake for the treatment of CAR-T cell associated CRS.  If a dose of tocilizumab is needed in the outpatient clinic, it is available as a standard stock item.    Patient Weight: 102 mg  Tocilizumab dose (8 mg/kg): 800 mg   Tocilizumab supply:  use commercially available supply    Tocilizumab should be ordered by a provider that is familiar with the treatment of CAR-T cell therapy associated CRS.  Appropriate treatment guidelines should be followed to ensure appropriate treatment of CRS.    Pharmacy will continue to monitor the use of tocilizumab for this patient and procure additional supply as needed.    Thank you,  Cole Adams, [facility], located within [facility], PharmD

## 2022-01-17 NOTE — PROGRESS NOTES
Patient admitted to:  Room 5401  Admitted from: Home in [place]  Arrived by: Personal vehicle  Reason for admission:Yescarta cell infusion  Patient accompanied by: Wife Nancy  Belongings: In room with patient  Teaching: Bed controls and TV remote.  Skin double check completed by: To be completed  By next shift nurse.

## 2022-01-17 NOTE — PROGRESS NOTES
Type of transplant: Donor: Autologous  Product:   BMT INFUSION DOCUMENTATION (last 48 hours)     BMT/Cellular Product Infusion     Row Name 01/17/22 0900                [REMOVED] Product 01/17/22 1038 T Cells, Apheresis, [id number]    Product Details Product Release Date: 01/17/22  -NB Product Release Time: 1038  -JK Product Type: T Cells, Apheresis, [id number]  -NB DIN: [id number]  -NB Product Description Code: [id number]  -NB Volume Dispensed (mL): 68 mL  -NB Completion Date (RN to complete): 01/17/22  -GT Completion Time (RN to complete): 1505  -GT       Checked by (Patient RN) Milton Hathaway RN  -GT       Checked by (Witness) Juanita Moreno RN  -GT       Product Volume Infused (mL) 68 mL  -GT       Flush Volume (mL) 50 mL  -GT       Volume Dispensed (mL) --             User Key  (r) = Recorded By, (t) = Taken By, (c) = Cosigned By    Initials Name Effective Dates    GT Milton Hathaway RN 04/29/14 -     Rox Rush 02/20/20 -     Dimple Nice 07/11/21 -               Preparation: RN Documentation  Patient was premedicated as ordered: yes  Line Type: central line, left  Patient Stable Prior to Infusion: yes  Time Infusion Started: 1435  Teaching: side effects/monitoring  Tolerated/Reaction: Patient tolerance of product infusion  Immediate suspected transfusion reaction to the product: none  Symptoms during/after infusion: other (comment) (headache)  Did the patient tolerate the infusion well: yes  Medications and treatment for symptoms: oxycodone  Flush until: 2100  Plan: Monitor if headache is relieved by oxycodone along with post vitals and neuro checks.

## 2022-01-17 NOTE — PROGRESS NOTES
"BMT Daily CARTOX Note (complete days 0-14 and with any subsequent CRS/neurotoxicity)    Date: January 17, 2022    Juvenal Blake ([id number]) is a 56 year old year old male who will receive infusion on 1/17/22, currently day 0 of CAR-T cell therapy Yescarta    Vital Signs: /82 (BP Location: Right arm)   Pulse 71   Temp 97.8  F (36.6  C) (Oral)   Resp 16   Ht 1.715 m (5' 7.52\")   Wt 102.7 kg (226 lb 6.4 oz)   SpO2 96%   BMI 34.92 kg/m      1) CRS Grading (based ONLY on parameters in box below)    Fever:   </= 100.4    Blood pressure:   SBP >/= 90 (not hypotensive)    Oxygen saturation:    >/= 90% on room air (not hypoxic)    CRS Parameter Grade 1  Grade 2 Grade 3 Grade 4   Fever* Tm >= 100.4 degrees F Tm >= 100.4 degrees F Tm >= 100.4 degrees F Tm >= to 100.4  degrees F      With Either:  Hypotension (SBP <90) None Responsive to Fluids  Requiring 1  vasopressor (w/ or w/o vasopressin) Requiring multiple  vasopressors  (excluding  vasopressin)     And/or  Hypoxia (O2 sats <90% on room air) None Low-flow nasal  cannula or blow-by High-flow nasal  cannula, face mask,  non-rebreather mask,or Venturi mask  Requiring positive  pressure (CPAP,  BiPAP intubation and  mechanical  ventilation)     CRS Summary  Does patient have CRS? No  Current CRS stgstrstastdstest:st st1st What therapy was given: na  Has CRS grade changed? na  Has CRS resolved? na      2) Neurotoxicity:    ICE Assessment  Orientation to year, month, city, hospital: 4 points, Name 3 objects (e.g., point to clock, pen, button): 3 point, Following commands: (e.g., Show me 2 fingers): 1 point, Write a standard sentence (e.g., The tang jumped over the log): 1 point and Count backwards from 100 by ten: 1 point  Total points: 10: No impairment    ASTCT ICANS Consensus Grading for Adults  ICE Score   10    Seizure   No seizures    Motor Findings   No motor findings    Elevated ICP/Cerebral Edema   None      Neurotoxicity  Domain Grade 1 Grade 2 Grade 3 Grade 4   ICE " score 7-9 3-6 1-2 0   Depressed LOC      Awakens  spontaneously Awakens to  voice  Awakens only to  tactile stimulation Unarousable or  requires vigorous  or repetitive  tactile stimuli to  arouse. Stupor  or coma.   Seizure n/a n/a Any clinical  seizure focal or  generalized that  resolves rapidly  or nonconvulsive  seizures on EEG  that resolve with  intervention  Life-threatening  prolonged  seizure (>5 min);  or Repetitive  clinical or  electrical  seizures without  return to baseline  in between    Motor Findings      n/a n/a n/a Deep focal motor  weakness such  as hemiparesis  or paraparesis   Elevated  ICP/cerebral  edema  n/a n/a Focal/local  edema on  neuroimaging  Diffuse cerebral  edema on  neuroimaging;  decerebrate or  decorticate  posturing; or  cranial nerve VI  palsy; or  papilledema; or  Cushing's triad     ICANS grade is determined by the most severe event (ICE score, level of consciousness, seizure, motor findings, raised ICP/cerebral edema) not attributable to any other cause; for example, a patient with an ICE score of 3 who has a generalized seizure is classified as grade 3 ICANS.    A patient with an ICE score of 0 may be classified as grade 3 ICANS if awake with global aphasia, but a patient with an ICE score of 0 may be classified as grade 4 ICANS if unarousable.     Depressed level of consciousness should be attributable to no other cause (eg, no sedating medication)    Tremors and myoclonus associated with immune effector cell therapies may be graded according to CTCAE v5.0,but they do not influence ICANS grading.     Intracranial hemorrhage with or without associated edema is not considered a neurotoxicity feature and is  excluded from ICANS grading. It may be graded according to CTCAE v5.0.          Neurotoxicity Summary  Does patient have neurotoxicity? No  Current Neurotoxicity score (0-10): 0  What therapy was given: na  Has neurotoxicity grade changed? na  Has neurotoxicity resolved?  na      3) Organ CAR-T toxicity:    Cardiac   none    Respiratory   none    Gastrointestinal   none    Hepatic    none    Skin   none     Coagulopathy    none     Other: no      4) HLH   no     * CTCAE grading can be found at   https://ctep.cancer.gov/protocoldevelopment/electronic_applications/docs/CTCAE_v5_Quick Reference_8.5x11.pdf    Jossie Cason PA-C  [phone]

## 2022-01-17 NOTE — H&P
BMT History & Physical       Patient Demographics   Patient ID:  Juvenal Blake   Age:  56 year old   Sex:  male  Reason for Admission/CC: follicular lymphoma here for Yescarta CAR-T  Date:  1/17/2022  Service: BMT   Informant:  Patient and Chart  Resuscitation Status: Full Code    Patient ID:  Juvenal Blake is a 56 year old male, currently day 0 of his [facility] CAR-T.    Transplant Essential Data:   Diagnosis NHLFL Non-Hodgkin lymphoma, Follicular, predominantly large cell  BMTCT Type Cellular Therapy    Prep Regimen Fludarabine, Cytoxan  Donor Source Autologous     GVHD Prophylaxis NA  Primary BMT MD Dr. Irwin    Clinical Trials   2017-45           HPI: Mr. Blake is a 56 year old man with relapsed/refractory NHL. Most recent relapse (FL) confirmed on 12/20 on biopsy of a node. He has been treated so far with BR, OCHOP, NAM NK (9/1/20),  (6/28/21), idelalisib, and Mike. Recent URI + parainfluenza (12/23/21; neg RVP 1/17/22). He is now proceeding with [facility]a CAR-T therapy. Received fludarabine/Cytoxan x3 days last week (outpatient). Admission today for cells and will remain admitted at least 7 days for close monitoring of CRS, neurotoxicity.            Blood Counts       Recent Labs   Lab Test 01/17/22  1153 01/14/22  0818 01/13/22  0952 07/03/21  1001 07/01/21  1257 06/28/21  0343 06/27/21  0401 06/26/21  0420   HGB 9.7* 10.8* 11.0*   < > 6.6*   < > 7.5* 6.7*   HCT 28.5* 31.4* 32.2*   < > 20.6*   < > 23.2* 21.4*   WBC 1.3* 3.5* 3.6*   < > 0.7*   < > 4.3 5.9   ANEUTAUTO 1.2* 3.2 2.9   < >  --   --   --   --    ALYMPAUTO 0.0* 0.1* 0.2*   < >  --   --   --   --    AMONOAUTO 0.1 0.2 0.4   < >  --   --   --   --    AEOSAUTO 0.0 0.1 0.1   < >  --   --   --   --    ABSBASO 0.0 0.0 0.0   < >  --   --   --   --    NRBCMAN 0.0 0.0 0.0   < >  --   --   --   --    ABLA  --   --   --   --  0.0  --  0.1* 0.2*   PLT 85* 101* 114*   < > 20*   < > 40* 46*    < > = values in this interval not displayed.          Recent Labs   Lab Test 12/29/21  0820   ABORH O POS         Recent Labs   Lab Test 12/29/21  0820   HGBS Negative         Chemistries     Basic Panel  Recent Labs   Lab Test 01/17/22  1153 01/14/22  0818 01/13/22  0952    142 136   POTASSIUM 4.0 4.2 4.1   CHLORIDE 106 107 110*   CO2 25 26 26   BUN 16 11 13   CR 0.97 1.11 1.23   * 126* 116*        Calcium, Magnesium, Phosphorus  Recent Labs   Lab Test 01/17/22  1153 01/14/22  0818 01/13/22  0952   TRE 9.3 8.8 8.7   MAG 2.2 2.0 2.1   PHOS 3.1 2.4* 2.4*        LFTs  Recent Labs   Lab Test 01/17/22  1153 01/14/22  0818 01/13/22  0952   BILITOTAL 0.6 0.4 0.4   ALKPHOS 90 92 93   AST 23 24 27   ALT 44 70 80*   ALBUMIN 3.5 3.7 3.8       LDH  Recent Labs   Lab Test 01/17/22  1153 01/12/22  0755 12/29/21  0820    212 208       B2-Microglobulin  Recent Labs   Lab Test 09/29/20  1123 07/16/20  1559   CMKG2PLKZ 4.0* 2.4*       Vitamin D  No lab results found.      Urine Studies       Recent Labs   Lab Test 12/29/21  0925 09/21/21  0913 08/25/21  0745 07/29/21  1007   COLOR Yellow   < > Yellow Yellow   APPEARANCE Clear   < > Clear Clear   URINEGLC Negative   < > Negative Negative   URINEBILI Negative   < > Negative Negative   URINEKETONE Negative   < > Negative Negative   SG 1.010   < > 1.012 1.009   UBLD Negative   < > Negative Negative   URINEPH 5.0   < > 6.0 7.0   PROTEIN Negative   < > Negative Negative   UUROI Normal   < > Normal Normal   NITRITE Negative   < > Negative Negative   LEUKEST Negative   < > Negative Negative   MUCUS  --   --   --  Present*   RBCU <1   < > 1 0   WBCU 0   < > <1 0   USQEI  --   --  <1  --     < > = values in this interval not displayed.       Creatinine Clearance    No lab results found.      Infectious Disease Markers     [facility] Blood Gallion IDM    Recent Labs   Lab Test 12/29/21  0820   DCMIG Positive*   DHBSAG Non-Reactive   DHBCAB Non-Reactive   DHIVAB Non-Reactive   DHCVAB Non-Reactive   DHTLVA Non-Reactive    TCRUZI Non-Reactive   DTRPAB Non-Reactive       CMV  Recent Labs   Lab Test 06/04/21  1040   CMVIGG 3.6*         EBV    Recent Labs   Lab Test 12/29/21  0820   EBVCAG Positive*       HSV 1/2    Recent Labs   Lab Test 12/29/21  0820   [id number] 2.30*   H1IGG Positive.  IgG antibody to HSV-1 detected.*   [id number] 0.26   H2IGG No HSV-2 IgG antibodies detected.       HTLV    Recent Labs   Lab Test 12/29/21  0820   DHTLVA Non-Reactive       COVID    Recent Labs   Lab Test 01/14/22  0833   [id number] Negative         Immunoglobulins     Recent Labs   Lab Test 12/29/21  0820 12/03/21  0919 11/19/21  1022 10/20/21  1016 06/04/21  1040   * 352* 470* 240* 457*       Recent Labs   Lab Test 06/04/21  1040 08/15/19  1459 03/15/19  1434   IGA 8* 10* 12*       Recent Labs   Lab Test 06/04/21  1040 08/15/19  1459 03/15/19  1434   IGM <10* 7* 7*           Chest X-Ray - 2 view     Results for orders placed in visit on 12/21/21    XR CHEST 2 VW    Status: Normal 12/21/2021    Narrative  PA and lateral chest    HISTORY: Follicular lymphoma grade 1 lymph nodes    COMPARISON STUDY: 11/27/2021    FINDINGS: Cardiac silhouette is nonenlarged. Lungs are clear. Right IJ  Port-A-Cath in the right mid SVC.    Impression  IMPRESSION: No acute airspace disease or adenopathy identified    BRENDA HOLLOWAY MD      SYSTEM ID:  [id number]      PFTs     FVC%  Recent Labs   Lab Test 06/04/21  1100 07/27/20 0615   20003 72 82       FEV1%  Recent Labs   Lab Test 06/04/21  1100 07/27/20 0615   20016 75 86       DLCO%  Recent Labs   Lab Test 06/04/21  1100 07/27/20 0615   20143 88 110       DLCO% (uncorrected, if corrected not available)    88% (most recently done 6/4/21)      EKG       ECG results from 12/03/21   EKG 12-lead, tracing only (Adults)     Value    Systolic Blood Pressure     Diastolic Blood Pressure     Ventricular Rate 65    Atrial Rate 65    WV Interval 120    QRS Duration 106        QTc 418    P Axis 64    R AXIS -47    T Axis  85    Interpretation ECG      Sinus rhythm  Left anterior fascicular block  Abnormal ECG  When compared with ECG of 21-SEP-2021 09:25,  Twave abnormality  is now Present  ST more depressed Inferior leads  Confirmed by MD JAZMYN, STANLEY () on 12/3/2021 1:26:52 PM       *Note: Due to a large number of results and/or encounters for the requested time period, some results have not been displayed. A complete set of results can be found in Results Review.         ECHOCARDIOGRAM       Results for orders placed during the hospital encounter of 21    ECHOCARDIOGRAM COMPLETE    Status: Normal 12/3/2021    Narrative  [id number]  [id number]  [id number]  [id number]^LISA^LAURI    [facility]  Echocardiography Laboratory  [address] [place] [address]    Name: MR. TARA BERTRAND  MRN: [id number]  : 1965  Study Date: 2021 07:43 AM  Age: 56 yrs  Gender: Male  Patient Location: [facility]  Reason For Study: Follicular lymphoma grade i, lymph nodes of multiple sites  (H),  Ordering Physician: LAURI GONZALEZ  Referring Physician: LAURI GONZALEZ  Performed By: Kristin Snider RDCS, RVT    BSA: 2.2 m2  Height: 67 in  Weight: 243 lb  HR: 72  BP: 132/77 mmHg  ______________________________________________________________________________  Procedure  Echocardiogram with two-dimensional, color and spectral Doppler performed.  ______________________________________________________________________________  Interpretation Summary  Global and regional left ventricular function is normal with an EF of 55-60%.  Global peak LV longitudinal strain is averaged at -27.5%. This is within  reported normal limits (normal <-18%).  Global right ventricular function is normal.  Ascending aorta is borderline dilated at 3.9 cm.    This study was compared with the study from 2021 .There has been  no  change.  ______________________________________________________________________________  Left Ventricle  Left ventricular size is normal. Global and regional left ventricular function  is normal with an EF of 55-60%. Left ventricular wall thickness is normal.  Left ventricular diastolic function is normal. Global peak LV longitudinal  strain is averaged at -27.5%. This is within reported normal limits (normal <-  18%).    Right Ventricle  The right ventricle is normal size. Global right ventricular function is  normal.    Atria  Both atria appear normal.    Mitral Valve  The mitral valve is normal. Trace mitral insufficiency is present.    Aortic Valve  Aortic valve is normal in structure and function. The aortic valve is  tricuspid.    Tricuspid Valve  The tricuspid valve is normal. Trace tricuspid insufficiency is present.    Pulmonic Valve  The pulmonic valve is normal. Trace pulmonic insufficiency is present.    Vessels  The inferior vena cava was normal in size with preserved respiratory  variability. Sinuses of Valsalva 3.8 cm. Ascending aorta 3.9 cm. Mild  dilatation of the aorta is present. IVC diameter <2.1 cm collapsing >50% with  sniff suggests a normal RA pressure of 3 mmHg.    Pericardium  No pericardial effusion is present.    Compared to Previous Study  This study was compared with the study from 6/25/2021 . There has been no  change.  ______________________________________________________________________________  MMode/2D Measurements & Calculations  IVSd: 0.89 cm    LVIDd: 6.0 cm  LVIDs: 4.0 cm  LVPWd: 0.93 cm  FS: 33.2 %  LV mass(C)d: 218.6 grams  LV mass(C)dI: 99.5 grams/m2  Ao root diam: 3.7 cm  asc Aorta Diam: 3.9 cm  LVOT diam: 2.5 cm  LVOT area: 4.9 cm2  LA Volume (BP): 69.1 ml  LA Volume Index (BP): 31.4 ml/m2  RWT: 0.31    Doppler Measurements & Calculations  MV E max gini: 68.3 cm/sec  MV A max gini: 60.2 cm/sec  MV E/A: 1.1  MV dec slope: 269.0 cm/sec2  MV dec time: 0.25 sec  PA acc  time: 0.07 sec  E/E' av.8  Lateral E/e': 7.3  Medial E/e': 8.3    ______________________________________________________________________________  Report approved by: Lexii HERMAN 2021 10:34 AM        PET Scan       Results for orders placed during the hospital encounter of 22    PET ONCOLOGY WHOLE BODY    Status: Normal 1/3/2022    Narrative  Combined Report of:    PET and CT on  1/3/2022 11:36 AM :    1. PET of the neck, chest, abdomen, and pelvis.  2. PET CT Fusion for Attenuation Correction and Anatomical  Localization:  3. Diagnostic CT scan of the chest, abdomen, and pelvis with  intravenous contrast for interpretation.  4. 3D MIP and PET-CT fused images were processed on an independent  workstation and archived to PACS and reviewed by a radiologist.    Technique:    1. PET: The patient received 13.96 mCi of F-18-FDG; the serum glucose  was 130 prior to administration, body weight was 107 kg. Images were  evaluated in the axial, sagittal, and coronal planes as well as the  rotational whole body MIP. Images were acquired from the Vertex to the  Feet.    UPTAKE WAS MEASURED AT 60 MINUTES.    BACKGROUND:  Liver SUV max= 4.6,   Aorta Blood SUV Max: 4.0.    2. CT: Volumetric acquisition for clinical interpretation of the  chest, abdomen, and pelvis acquired at 3 mm sections . The chest,  abdomen, and pelvis were evaluated at 5 mm sections in bone, soft  tissue, and lung windows.    The patient received 135 cc of Isovue 370 intravenously for the  examination.  High resolution images of the neck were obtained with multiple oblique  projection reformats.    3. 3D MIP and PET-CT fused images were processed on an independent  workstation and archived to PACS and reviewed by a radiologist.    INDICATION: Follicular lymphoma (H); NH lymphoma (H); Personal history  of diseases of blood and blood-forming organs    ADDITIONAL INFORMATION OBTAINED FROM EMR: none    COMPARISON: CT chest abdomen and pelvis  11/27/2021. PET/CT 9/21/2021.  PET/CT 7/27/2021    FINDINGS:    HEAD/NECK:  See separate report from dedicated PET/CT neck concurrently acquired.    CHEST:  Multiple new/enlarged hypermetabolic lymph nodes since prior PET/CT  9/21/2021, which are also increased in size when compared to recent CT  11/27/2021:  - Right axillary level 1 lymph node measuring 1.4 cm in short axis  with SUV max 9.3. Adjacent level 2 hypermetabolic lymph node with SUV  max 4.8.  - Right paratracheal lymph node measuring 1.1 cm with SUV max 6.2,  previously measuring 0.7 cm.  - Hypermetabolic lymph node in the azygoesophageal recess measures up  to 1.3 cm, previously measuring up to 0.9 cm, with SUV max 5.5.    Heart is not enlarged. No pericardial effusion. Major thoracic vessels  are normal in caliber. No central pulmonary embolus. Esophagus is  unremarkable. Trachea and central airways are clear of debris. No  pleural effusion or pneumothorax. No pulmonary consolidation. No new  or enlarging pulmonary nodule.      ABDOMEN AND PELVIS:  Multiple new/enlarged hypermetabolic lymph nodes since prior PET/CT  9/21/2021, which are also increased in size when compared to recent CT  11/27/2021:  - Conglomeration of retroperitoneal/paraaortic lymph nodes is  significantly increased in size and hypermetabolic uptake with SUV max  13.0.  - Hazy central mesenteric stranding is similar prior exams, though  there are a few enlarged hypermetabolic lymph nodes along the  mesenteric vessels centrally which are increased compared to prior  exams.  -Nodular soft tissue attenuation implants within the retroperitoneal  paraspinous fat, for example in the right measuring 1.1 cm with SUV  max 5.6, and on the left measuring 0.8 cm, with SUV max 4.8  -Multiple enlarged and hypermetabolic iliac and inguinal lymph nodes,  right greater than left. For example, a right inguinal lymph node  measures 2.9 cm, previously 2.7 cm, with SUV max 15.7.    No focal suspicious  hepatic lesion. Gallbladder, bile ducts, pancreas,  adrenal glands are unremarkable. Spleen measures 15 cm, unchanged  since 11/27/2021. Multiple renal cortical and parapelvic cysts are  unchanged. Thickening of the wall of the urinary bladder, with limited  evaluation due to its decompressed state. No dilated bowel. Appendix  is normal. Small amount of layering fluid in the pelvis. Major vessels  are normal in caliber.    LOWER EXTREMITIES:  No abnormal masses or hypermetabolic lesions.    BONES:  There are no suspicious lytic or blastic osseous lesions.  There is no  abnormal FDG uptake in the skeleton. Bilateral fat-containing inguinal  hernias.    Impression  IMPRESSION: History of follicular lymphoma:  1. Progression of disease by CT and PET Lugano criteria since  11/27/2021 and 9/21/2021, respectively.  2. Hypermetabolic Deauville 5 lymphadenopathy (periaortic  retroperitoneal, pelvic, pelvic, inguinal, mediastinal).  2a. Right axillary hypermetabolic lymphadenopathy may be lymphoma or  secondary to Covid vaccination if it occurred in the right deltoid.  3. Stable splenomegaly.    Lugano Criteria for Lymphoma response to therapy methodology  Reported as: ex.  Lugano Criteria: Complete Response    PET response  Used for FDG avid Lymphomas (Hodgkins & Diffuse Large B-Cell)  Complete                     <=Liver  (Deauville 1-3)  Partial                          > Liver & decreased from baseline  (Deauville 4-5)  Stable/No response     > Liver & no change from baseline  (Deauville  4-5)  Progressive                 > Liver & Increased or new FDG avid  lesion (Deauville 4-5)    CT response  Used for variable uptake Lymphomas (Follicular, Marginal zone, CLL,  Mantle Cell)  Complete                     all nodes <1.5 cm  (longest diameter)  Partial                          Shrank > 50%        (SPD*)  Stable/No response      Shrank <50%         (SPD*)  Progressive disease      New or Increased size of lesions    *SPD  = (sum of up to six lesions (longest x shortest diameter)    Source: Ignacio et al.  Imaging for Staging and Response Assessment in  Lymphoma.  Radiology August 2015.  _____________________________________________    The Deauville 5-point scoring system is an internationally accepted  and utilized five-point scoring system for the fluorodeoxyglucose  (FDG) avidity of a Hodgkin lymphoma or Non-Hodgkin lymphoma tumor mass  as seen on FDG positron emission tomography:[1]    Score 1: No uptake above the background  Score 2: Uptake ? mediastinum  Score 3: Uptake > mediastinum but ? liver  Score 4: Uptake moderately increased compared to the liver at any site  Score 5: Uptake markedly increased compared to the liver at any site  Score X: New areas of uptake unlikely to be related to lymphoma    I have personally reviewed the examination and initial interpretation  and I agree with the findings.    ISIDRA PRATHER MD      SYSTEM ID:  [id number]         MRI Brain       Results for orders placed during the hospital encounter of 09/30/20    MR BRAIN W/O & W CONTRAST    Status: Normal 10/1/2020    Narrative  MR BRAIN W/O & W CONTRAST 10/1/2020 5:37 PM    Provided History: Hx of follicular lymphoma, concern for  transformation, new headaches.    Comparison: Whole-body PET/CT 9/29/2020..    Technique: Multiplanar T1-weighted, axial FLAIR, and susceptibility  images were obtained without intravenous contrast. Following  intravenous gadolinium-based contrast administration, axial  T2-weighted, diffusion, and T1-weighted images (in multiple planes)  were obtained.    Contrast: 10mL Gadavist    Findings:  There is no mass effect, midline shift, or evidence of intracranial  hemorrhage. The ventricles are proportionate to the cerebral sulci.  Diffusion-weighted images reveal no abnormal reduced diffusion.  Normal major vascular intracranial flow-voids.    Postcontrast images demonstrate no abnormal intracranial enhancement.    No  abnormality of the skull marrow signal. The visualized portions of  paranasal sinuses are relatively clear. Trace mastoid effusions  bilaterally. The orbits are grossly unremarkable.    Impression  Impression:  Unremarkable brain MRI. No evidence for intracranial spread of  disease.    I have personally reviewed the examination and initial interpretation  and I agree with the findings.    BHAVIK HI MD         I have assessed all abnormal lab values for their clinical significance and any values considered clinically significant have been addressed in the assessment and plan    Family History:   Family History   Problem Relation Age of Onset     Colon Cancer Mother         diagnosed in her late 40s     Lymphoma Father         non-Hodgkins lymphoma, diagnosed 70s     No Known Problems Brother      No Known Problems Sister      No Known Problems Son      No Known Problems Sister      No Known Problems Son      No Known Problems Sister      No Known Problems Brother      No Known Problems Son      No Known Problems Sister      No Known Problems Son      Lung Cancer Maternal Grandmother      Lung Cancer Maternal Uncle        Social History:   Social History     Socioeconomic History     Marital status:      Spouse name: Not on file     Number of children: Not on file     Years of education: Not on file     Highest education level: Not on file   Occupational History     Not on file   Tobacco Use     Smoking status: Former Smoker     Quit date: 1995     Years since quittin.6     Smokeless tobacco: Never Used   Substance and Sexual Activity     Alcohol use: Yes     Drug use: Yes     Types: Marijuana     Sexual activity: Not on file   Other Topics Concern     Parent/sibling w/ CABG, MI or angioplasty before 65F 55M? Not Asked   Social History Narrative     Not on file     Social Determinants of Health     Financial Resource Strain: Not on file   Food Insecurity: Not on file   Transportation Needs:  Not on file   Physical Activity: Not on file   Stress: Not on file   Social Connections: Not on file   Intimate Partner Violence: Not At Risk     Fear of Current or Ex-Partner: No     Emotionally Abused: No     Physically Abused: No     Sexually Abused: No   Housing Stability: Not on file       Past Medical History:   Past Medical History:   Diagnosis Date     Arthritis     shoulder per H & P     Cancer (H)      GERD (gastroesophageal reflux disease)      H/O pyloric stenosis     as an infant per H & P      Inguinal hernia     per H & P      Lazy eye     per H & P      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)      Low serum IgG for age      Non Hodgkin's lymphoma (H)      Non Hodgkin's lymphoma (H)      Prostate CA (H)      Shortness of breath      Sleep apnea     CPAP        Past Surgical History:   Past Surgical History:   Procedure Laterality Date     ABDOMEN SURGERY      for pyloric stenosis as an infant     COLONOSCOPY       DISTAL CLAVICLE EXCISION      per H & P      HERNIA REPAIR      inguinal     HERNIA REPAIR, UMBILICAL       INSERT PICC LINE N/A 08/24/2021    Procedure: INSERTION, PICC EXCHANGE, PREVIOUS PICC IS OUT 10 CENTIMETERS;  Surgeon: Christiano Murdock MD;  [place] [facility] [place]     IR CHEST PORT PLACEMENT > 5 YRS OF AGE  02/17/2017     IR LYMPH NODE BIOPSY  10/01/2020     IR LYMPH NODE BIOPSY  05/28/2021     IR LYMPH NODE BIOPSY  12/20/2021     IR PICC PLACEMENT > 5 YRS OF AGE  08/24/2021     LAPAROSCOPIC HERNIORRHAPHY INCISIONAL Right 05/28/2020    Procedure: HERNIORRHAPHY, UMBILICAL, LAPAROSCOPIC; AXILLARY LYMPH NODE BIOPSY;  Surgeon: Rob Farrell MD;  [facility];  Service: General     OTHER SURGICAL HISTORY Left     shoulder arthroscopy     OTHER SURGICAL HISTORY  01/01/2017    port a cath placement     PICC DOUBLE LUMEN PLACEMENT Right 06/27/2021    47cm (3cm external), Basilic vein     PICC DOUBLE LUMEN PLACEMENT Left 01/17/2022    46 cm 5 fr dl Bard PICC     VT  "LAP,PROSTATECTOMY,RADICAL,W/NERVE SPARE,INCL ROBOTIC N/A 11/15/2017    Procedure: ROBOTIC ASSISTED RADICAL RETROPUBIC PROSTATECTOMY BILATERAL PELVIC LYMPH NODE DISSECTION ;  Surgeon: Ezio Steele MD;  [place] [facility] [place]  Service: Urology     TONSILLECTOMY       US LYMPH NODE BIOPSY  01/07/2019       Allergies:   Allergies   Allergen Reactions     Rituxan [Rituximab] Shortness Of Breath     Wife states \"seizure-like symptoms\"     Ondansetron Itching       Home Medications      Prior to Admission medications    Medication Sig Start Date End Date Taking? Authorizing Provider   acyclovir (ZOVIRAX) 800 MG tablet Take 1 tablet (800 mg) by mouth every 12 hours 1/13/22  Yes Penny Garcia PA-C   albuterol (VENTOLIN HFA) 108 (90 Base) MCG/ACT inhaler INHALE 2 PUFFS BY MOUTH FOUR TIMES DAILY AS NEEDED FOR COUGH OR CONGESTION 8/17/21  Yes Glory Covarrubias PA-C   allopurinol (ZYLOPRIM) 300 MG tablet Take 1 tablet (300 mg) by mouth daily 1/12/22  Yes Narcisa Stewart PA-C   beclomethasone HFA (QVAR REDIHALER) 80 MCG/ACT inhaler Inhale 1 puff into the lungs daily as needed (for reactive airway symptoms)  8/17/21  Yes Glory Covarrubias PA-C   benzonatate (TESSALON) 100 MG capsule Take 1 capsule (100 mg) by mouth 3 times daily as needed for cough 12/23/21  Yes Kinga Bauer PA-C   Calcium Polycarbophil (FIBER-CAPS PO) Take 2 capsules by mouth daily   Yes Unknown, Entered By History   fluconazole (DIFLUCAN) 100 MG tablet Take 1 tablet (100 mg) by mouth daily 1/11/22  Yes Rosa Terry MD   guaiFENesin-codeine (ROBITUSSIN AC) 100-10 MG/5ML solution Take 5-10 mLs by mouth every 4 hours as needed for cough 12/23/21  Yes Kinga Bauer PA-C   medical cannabis (Patient's own supply) 1 Dose daily as needed (The purpose of this order is to document that the patient reports taking medical cannabis)   Yes Unknown, Entered By History   omeprazole (PRILOSEC) 20 MG DR capsule TAKE 2 CAPSULES(40 MG) " "BY MOUTH DAILY 1/11/22  Yes Madhavi Kurtz APRN CNP   sulfamethoxazole-trimethoprim (BACTRIM DS) 800-160 MG tablet Take 1 tablet twice daily by mouth on Mon and Tue every week 11/19/21  Yes Penny Garcia PA-C   tolterodine ER (DETROL LA) 4 MG 24 hr capsule Take 1 capsule (4 mg) by mouth daily 11/24/21  Yes Luis Nation MD       Review of Systems    CONSTITUTIONAL: NEGATIVE for fever, chills, change in weight  INTEGUMENTARY/SKIN: NEGATIVE for worrisome rashes, moles or lesions  EYES: NEGATIVE for vision changes or irritation  ENT/MOUTH: NEGATIVE for ear, mouth and throat problems  RESP: NEGATIVE for significant cough or SOB  CV: NEGATIVE for chest pain, palpitations or peripheral edema  GI: NEGATIVE for nausea, abdominal pain, heartburn, or change in bowel habits  MUSCULOSKELETAL: NEGATIVE for significant arthralgias or myalgia  NEURO: NEGATIVE for weakness, dizziness or paresthesias  HEME/ALLERGY: NEGATIVE for bleeding problems  PSYCHIATRIC: NEGATIVE for changes in mood or affect    PHYSICAL EXAM      Weight     Wt Readings from Last 3 Encounters:   01/17/22 102.7 kg (226 lb 6.4 oz)   01/14/22 108.6 kg (239 lb 6.4 oz)   01/13/22 108.2 kg (238 lb 8 oz)        KPS: 90    /73   Pulse 67   Temp 97.8  F (36.6  C) (Oral)   Resp 16   Ht 1.715 m (5' 7.52\")   Wt 102.7 kg (226 lb 6.4 oz)   SpO2 96%   BMI 34.92 kg/m       General: NAD   Eyes: JOE, sclera anicteric   Nose/Mouth/Throat: OP moist, no ulcerations   Lungs: CTA bilaterally  Cardiovascular: RRR, no M/R/G   Abdominal/Rectal: +BS, soft, NT, obese, No HSM   Lymphatics: trace LE edema  Skin: No rash  Neuro: non-focal  Access: new L arm PICC dressing cdi. R chest PAC.    LABS AND IMAGING: I have assessed all abnormal lab values for their clinical significance and any values considered clinically significant have been addressed in the assessment and plan.      Lab Results   Component Value Date    WBC 1.3 (L) 01/17/2022    ANEU 0.6 (L) " 09/08/2021    HGB 9.7 (L) 01/17/2022    HCT 28.5 (L) 01/17/2022    PLT 85 (L) 01/17/2022     01/17/2022    POTASSIUM 4.0 01/17/2022    CHLORIDE 106 01/17/2022    CO2 25 01/17/2022     (H) 01/17/2022    BUN 16 01/17/2022    CR 0.97 01/17/2022    MAG 2.2 01/17/2022    INR 1.03 01/17/2022    BILITOTAL 0.6 01/17/2022    AST 23 01/17/2022    ALT 44 01/17/2022    ALKPHOS 90 01/17/2022    PROTTOTAL 6.6 (L) 01/17/2022    ALBUMIN 3.5 01/17/2022         SYSTEMS-BASED ASSESSMENT AND PLAN     Juvenal Blake is a 56 year old male PMH significant for refractory NHL, Day 0 of Yescarta CAR-T therapy (day 0 : 1/17/22).     1. Follicular Lymphoma/Yescarta CAR-T:   Relapsed after BR, [facility], [facility] NK (9/1/20), idelalisib (~4-6/2020),  (6/28/21), polatuzumab (12/21/21). (hx Rituxan reaction).   Now LD chemo with fludarabine/Cytoxan 1/12-1/14/22 prior to CAR-T infusion.    - cont allopurinol  - Day 0 Yescarta CAR-T 1/17/22     2. HEME: Keep hgb >7g, plt>10.  #pancytopenia 2/2 chemo/lymphoma.      3. ID: Afebrile.  #hx parainfluenza 3 (12/21/21): sx resolved  #hx rhinovirus (10/6/21).     #Prophy: acyclovir, fluconazole, Levaquin-started on admission. Inhaled Pentamidine 1/13/21.       #Hx C diff colitis. Screening C.dif pending on admission     #S/p 3 covid shots; first two were pre- and booster was after.  Defer to car -t team regarding if revaccination needed post CART.      4. GI:    - prn ativan, Compazine  - cont PTA omeprazole  #intermittent nausea -  medical cannibis.  #Abdominal Pain secondary to cancer. No complaints on admission     5. Renal/FEN: Creat, lytes wnl.  - replete prn per ss    6. CV:    Cardiology cleared him to proceed with no further testing. The CT angiogram showed no lesion requiring stenting or bypass.     7. :   Post radical prostatectomy and bilateral lymph node dissection. November 15, 2017. Prostatic adenocarcinoma Maricel 4+3 = 7 with tertiary pattern 5. Tumor involves 45% of total  surface area. Positive for extensive extraprostatic extension. Positive for bilateral seminal vesicle invasion. Multiple margins positive. Lymphovascular invasion not identified. Perineural invasion was noted. Lymph nodes negative. 3 were examined (2 right obturator and 1 left obturator). Completed radiation to the prostate fossa and pelvic lymph nodes [facility] early May 2018. He received 4500 cGy in 25 fractions. He then had 2340 cGy boost in 13 fractions to the prostatic fossa, for a total dose of 6240 cGy in 38 treatment fractions delivered over 54 days. He did not receive hormonal therapy:    9/10 PSA: 0.71 - no concerns.       Known issues that I take into account for medical decisions, with salient changes to the plan considering these complexities noted above.    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)     Leukopenia     Obstructive sleep apnea     Prostate cancer (H)     Recurrent sinusitis     Umbilical hernia     Preoperative examination     Stable angina pectoris (H)     Dyspnea on exertion     Follicular lymphoma (H)     Sepsis (H)     Morbid obesity (H)     Anaphylactoid reaction, initial encounter     DLBCL (diffuse large B cell lymphoma) (H)       I spent 120 minutes in the care of this patient today, which included time necessary for preparation for the visit, obtaining history, ordering medications/tests/procedures as medically indicated, review of pertinent medical literature, counseling of the patient, communication of recommendations to the care team, and documentation time.    Jossie Cason

## 2022-01-17 NOTE — CONSULTS
Assessment completed in BMT clinic on 12/30/21, please see information below for inpatient review.    Blood and Marrow Transplant   Psychosocial Assessment with   Clinical      Assessment completed on 12/30/21 via phone as part of the COVID 19 protocol. Assessment of living situation, support system, financial status, functional status, coping, stressors, need for resources and social work intervention provided as needed. Information for this assessment was provided by pt's (and his wife's) report in addition to medical chart review and consultation with medical team.      Present at Assessment:   Patient: Juvenal Blake   Spouse: Nancy Blake  : CITLALLI Cunningham LICSW     Diagnosis: NHL      Date of Diagnosis: 2019     Transplant type: YESCARTA     Donor: Autologous      Physician: Rosa Terry MD     Nurse Coordinator: Arlen Lui RN     Social Workers: CITLALLI Cunningham LICSW      Permanent Address:   [address] [place] [address]     Living Situation: Pt lives in [place], [place] w/ his wife Nancy.     Local Address: Pt lives in [place], [place] which is within the required distance of the hospital. Pt does not need to relocate.      Contact Information:  Cell Phone: [phone]  Home Phone: [phone]  Pt Email: [email or web address]  Wife's Cell Phone: [phone]     Presenting Information:  Juvenal Blake is a 56 year old male diagnosed with NHL who presents for evaluation for a YESCARTA CAR-T therapy at the [facility] ([facility] Pt was accompanied to today's visit by his wife Nancy.      Decision Making: Self     Health Care Directive: Yes. Pt has a health care directive and will bring it when they return to the BMT program.      Relationship Status: . Pt described relationship as stable/supportive.      Special Needs: None identified at this time.      Family/Support System: Pt endorsed a strong support system including family and  close friends who will be available to support pt throughout transplant process.      Family Information:   Spouse: Nancy Blake  Children: 2 biological sons (Juvenal the 2nd & Gwyn) and 2 step sons   Parents:   Siblings:  3 siblings- 1 brother (lives out of state) and 2 sisters     Caregiver: BERNIE discussed with pt the caregiver role and expectation at length. Pt is agreeable to having a full time caregiver for a minimum of 30 days until cleared by the BMT physician. Pt and pt's spouse confirmed understanding of the caregiver requirement. Pt's primary caregiver will be his wife Nancy with their children as a secondary or back-up to assist as needed. Caregiver education and resources provided. No caregiver concerns identified.      Caregiver Contact Information:  Nancy Blake (wife) - Cell Phone: [phone]     Transportation Mode: Personal Vehicle. Pt is aware of driving restrictions post-BMT and the need for the caregiver is to drive until cleared to drive by the BMT physician. SW provided information on parking info and monthly parking pass options.      Insurance: Pt has [employer] Access health insurance. Pt denied specific insurance concerns at this time. SW reiterated information about the [facility]  should specific insurance questions arise as pt moves through transplant process. Future Insurance questions referred to [facility] [employer] - Yesenia Olivas - # [phone].     Sources of Income: Pt is supported by SSDI and his wife's employment income. Pt denied anticipation of financial hardship related to BMT at this time. SW provided information on ryan options and encouraged pt to contact this SW for support should financial situation change.      Employment:   Currently employed: No; Pt has not worked since last year due to treatment and side effects. Pt states he is currently receiving SSDI and his pension.  Employer: [employer]  Occupation:      Spouse/Partner Employed: Yes - has summers off; last day of school 6/11/21.  Employer: [employer]  Occupation: Teacher     Mental Health: Pt reported a hx of Anxiety. Pt states he has PRN anxiety medication at home that he does not wish to utilize often. Pt states he has noticed an increase in anxiety and feelings of letting his family down due to relapsed disease. Pt states he would be interested in possibly starting an antidepressant and having a conversation surrounding this w/ the BMT physician. SW notified BMT team and DOM scheduled for closing on 12/30/21 who will f/u w/ Pt. SW explained that it's not uncommon for patients going through the cellular therapy process to experience symptoms of depression/anxiety. Pt believes he would be able to identify symptoms of depression/anxiety throughout the this process.      PHQ-9:  Pt scored a 3 which indicates \"none\" on the depression severity scale.      GAD7:  Pt scored a 4 which indicates no sign of anxiety on the Generalized Anxiety Disorder Questionnaire.      Chemical Use:   Chemical use identified. Juvenal notes that he drinks alcohol daily- (3-4 mixed drinks) and uses marijuana daily. Juvenal notes that the marijuana use is to help with his nausea. Discussed the need to abstain from these during the BMT process. Pt states he is agreeable and denies concern as he has communicated w/ BMT regarding this.      Trauma/Loss/Abuse History: Multiple losses associated with cancer diagnosis and treatment, including health, employment, changes to physical appearance, etc.      Spirituality:Patient does not identify with martha community.  Wife is Congregational and at times attend with her, but was raised [employer].      Coping: Pt noted that he is currently feeling \"nervous\". Pt states \"I'm hoping it actually works for more than a few months.\" Pt shared that his main coping mechanism is distracting himself w/ playing games or " watching TV. Pt states it also helps to talk to his wife. Pt also shared that he enjoys gardening, riding his motorcycle, going on walks, watching movies, and working on a scanning/uploading photos project. SW and pt discussed additional positive coping mechanisms that pt can utilize while in the hospital. While hospitalized, pt plans to watch movies.      Caregiver Coping: Pt's wife noted that she is doing well and denies questions or concerns. She processed the difficulty of hearing her spouse endorse feelings of guilt associated w/ relapse. She discussed how this disease is out of Pt's control. SW provided empathetic listening, validation, and support.     Education Provided: Transplant process expectations, Caregiver requirements, Caregiver self-care, Financial issues related to transplant, Financial resources/grants available, Common psychosocial stressors pre/post transplant, Support group(s) available, Hospital resources available, Web site information, Social Work role and Resources for children/siblings     Interventions Provided: Psychosocial Support and Education      Assessment and Recommendations for Team:  Pt is a 56 year old male diagnosed with NHL who is here undergoing preparation for a planned YESCARTA cellular infusion.     Pt is pleasant, calm and able to articulate concerns/coping mechanisms in an appropriate manner. During our meeting pt was alert, he was interactive, affect was full, he displayed appropriate memory and thought processes. Pt feels comfortable communicating with the medical team. Pt has a strong supportive network of family and friends who are involved. Pt and pt's spouse will benefit from ongoing psychosocial support in regards to coping with the adjustment to the BMT process.      Pt has a stable support system and a confirmed caregiver plan. Pt verbalizes understanding of the cellular therapy process and wanting to proceed. SW provided contact information and encouraged pt to  contact SW with questions, concerns, resources and for support.     Per this assessment, SW did not identify any barriers to this patient moving forward with transplant.     Important Information:   N/A      Follow up Planned:   Initiate financial resources - Pt was sent a [facility] application per his request.  Psychosocial support  Healthcare Directive - Pt states he will bring in a copy.     CITLALLI Cunningham, [facility]  Adult Blood & Marrow Transplant   Phone: [phone]  Pager: [phone]

## 2022-01-17 NOTE — CONSULTS
BMT CLINICAL SOCIAL WORK NOTE:    Focus: Supportive Counseling/Resources/Discharge Planning    Data: Juvenal Blake is a 56 year old male diagnosed with NHL who presents for planned YESCARTA CAR-T therapy at the [facility] ([facility]).     Interventions: Clinical  (CSW) met with Pt and his wife Nancy at bedside to assess coping, provide supportive counseling and assist with resources as needed. Pt states he is settling into his room well and denies overall questions. Pt discussed financial concerns related to home repairs needed and experiencing decreased income since diagnosis and inability to work. CSW provided empathic listening, validation of concerns, and encouragement.Pt submitted [facility] application to writer and additional ryan options were discussed including Chris Application and [facility] Co-Pay/Pt Aid/Travel Fund. Pt and wife expressed appreciation for the ryan information and state they will reach out to SW if they apply and have additional concerns/needs. Pt has a Healthcare Directive that is completed with an invalid notary signature.  scheduled a [facility] notary for tomorrow/Tuesday 1/18 at 11:45 AM w/ Pt and [employer] per his request. CSW encouraged Pt to contact CSW for support, questions and/or resources.     Assessment: Pt presented as pleasant, calm, and engaged.  Pt appears to be coping appropriately at this time. Pt continues to be supported by his wife.     Plan: CSW will continue to provide supportive counseling and assistance with resources as needed. CSW will continue to collaborate with multidisciplinary team regarding Pt's plan of care.     CITLALLI Cunningham, [facility]  Adult Blood & Marrow Transplant   Phone: [phone]  Pager: [phone]

## 2022-01-17 NOTE — PROCEDURES
BMT/Cellular Autologous Product Infusion         Patient Vitals for the past 24 hrs:   Temp Temp src Pulse Resp BP   01/17/22 1035 97.8  F (36.6  C) Oral 71 16 123/82      BMT INFUSION DOCUMENTATION (last 48 hours)     BMT/Cellular Product Infusion     Row Name                  Product 01/17/22 1038 T Cells, Apheresis, [id number]    Product Details Product Release Date: 01/17/22  -NB Product Release Time: 1038  -JK Product Type: T Cells, Apheresis, [id number]  -NB DIN: [id number]  -NB Product Description Code: [id number]  -NB Volume Dispensed (mL): 68 mL  -NB       Checked by (Patient RN) --       Checked by (Witness) --       Product Volume Infused (mL) --       Flush Volume (mL) --       Volume Dispensed (mL) --             User Key  (r) = Recorded By, (t) = Taken By, (c) = Cosigned By    Initials Name Effective Dates    PO JakubdeannaGianaRox L 02/20/20 -     NB Natalio Dimple YARIEL 07/11/21 -               Allogeneic Donor Eligibility Determination and Summary of Records: N/A - Autologous        Type of Infusion: Autologous      Baseline Pre-Infusion Evaluation (to be completed by Provider):   Dyspnea: Grade 0 - none  Hypoxia: Grade 0 - not present  Fever: Grade 0 - afebrile  Chills: Grade 0 - none  Febrile Neutropenia: Grade 0 - not present  Sinus Bradycardia: Grade 0 - none  Hypertension: Grade 1 - prehypertension (systolic -139 mm Hg or diastolic BP 80-89 mm Hg)  Hypotension: Grade 0 - none  Chest Pain: Grade 0 - none  Bronchospasm: Grade 0 - none  Pain: Grade 0 - none  Rash: Grade 0 - None  Neurologic Specify: none    If adverse reactions, events or complications occur (fever greater than 2 degrees fahrenheit increase, and severe reactions of the following types: chills, dyspnea, bronchospasm, hyper/hypotension, hypoxia, bradycardia, chest pain, back/flank pain, hypoxia, and any other reaction deemed severe or life threatening; any instance of product bag breakage or unusual product appearance)    Any other events  that are >= grade 3, then immediately contact the BMT Attending physician, the Cell Therapy Laboratory Medical Director (pager [phone]) and the Cell Therapy Laboratory ([phone]).  After midnight, holidays & weekends contact the [facility] on the appropriate campus ([facility]: [phone]; [facility]: [phone]).    Jossie Cason PA-C

## 2022-01-17 NOTE — PHARMACY-ADMISSION MEDICATION HISTORY
Admission Medication History Completed by Pharmacy    See [facility] Admission Navigator for allergy information, preferred outpatient pharmacy, prior to admission medications and immunization status.     Medication History Sources:     [facility]     Changes made to PTA medication list (reason):    Added: None    Deleted: None    Changed: None    Additional Information:    None    Prior to Admission medications    Medication Sig Last Dose Taking? Auth Provider   acyclovir (ZOVIRAX) 800 MG tablet Take 1 tablet (800 mg) by mouth every 12 hours 1/17/2022 at 0700 Yes Penny Garcia PA-C   albuterol (VENTOLIN HFA) 108 (90 Base) MCG/ACT inhaler INHALE 2 PUFFS BY MOUTH FOUR TIMES DAILY AS NEEDED FOR COUGH OR CONGESTION Past Week at Unknown time Yes Glory Covarrubias PA-C   allopurinol (ZYLOPRIM) 300 MG tablet Take 1 tablet (300 mg) by mouth daily 1/17/2022 at 0700 Yes Narcisa Stewart PA-C   beclomethasone HFA (QVAR REDIHALER) 80 MCG/ACT inhaler Inhale 1 puff into the lungs daily as needed (for reactive airway symptoms)  Past Week at Unknown time Yes Glory Covarrubias PA-C   benzonatate (TESSALON) 100 MG capsule Take 1 capsule (100 mg) by mouth 3 times daily as needed for cough Past Week at Unknown time Yes Kinga Bauer PA-C   Calcium Polycarbophil (FIBER-CAPS PO) Take 2 capsules by mouth daily 1/17/2022 at 0700 Yes Unknown, Entered By History   fluconazole (DIFLUCAN) 100 MG tablet Take 1 tablet (100 mg) by mouth daily 1/17/2022 at 0700 Yes Rosa Terry MD guaiFENesin-codeine (ROBITUSSIN AC) 100-10 MG/5ML solution Take 5-10 mLs by mouth every 4 hours as needed for cough Past Month at Unknown time Yes Kinga aBuer PA-C   medical cannabis (Patient's own supply) 1 Dose daily as needed (The purpose of this order is to document that the patient reports taking medical cannabis) 1/17/2022 at 0700 Yes Unknown, Entered By History   omeprazole (PRILOSEC) 20 MG DR capsule TAKE 2 CAPSULES(40 MG) BY MOUTH  DAILY 1/17/2022 at 0700 Yes Madhavi Kurtz APRN CNP   sulfamethoxazole-trimethoprim (BACTRIM DS) 800-160 MG tablet Take 1 tablet twice daily by mouth on Mon and Tue every week Past Week at Unknown time Yes Penny Garcia, PA-C   tolterodine ER (DETROL LA) 4 MG 24 hr capsule Take 1 capsule (4 mg) by mouth daily 1/17/2022 at 0700 Yes Luis Nation MD       Date completed: 01/17/22    Medication history completed by: Cole Adams [facility]

## 2022-01-18 ENCOUNTER — APPOINTMENT (OUTPATIENT)
Dept: OCCUPATIONAL THERAPY | Facility: CLINIC | Age: 57
DRG: 018 | End: 2022-01-18
Attending: PHYSICIAN ASSISTANT
Payer: COMMERCIAL

## 2022-01-18 ENCOUNTER — DOCUMENTATION ONLY (OUTPATIENT)
Dept: OTHER | Facility: CLINIC | Age: 57
End: 2022-01-18
Payer: COMMERCIAL

## 2022-01-18 LAB
ANION GAP SERPL CALCULATED.3IONS-SCNC: 7 MMOL/L (ref 3–14)
BASOPHILS # BLD AUTO: 0 10E3/UL (ref 0–0.2)
BASOPHILS NFR BLD AUTO: 0 %
BUN SERPL-MCNC: 17 MG/DL (ref 7–30)
CALCIUM SERPL-MCNC: 9.4 MG/DL (ref 8.5–10.1)
CHLORIDE BLD-SCNC: 105 MMOL/L (ref 94–109)
CMV DNA SPEC NAA+PROBE-ACNC: NOT DETECTED IU/ML
CO2 SERPL-SCNC: 26 MMOL/L (ref 20–32)
CREAT SERPL-MCNC: 1.01 MG/DL (ref 0.66–1.25)
EOSINOPHIL # BLD AUTO: 0 10E3/UL (ref 0–0.7)
EOSINOPHIL NFR BLD AUTO: 3 %
ERYTHROCYTE [DISTWIDTH] IN BLOOD BY AUTOMATED COUNT: 13.2 % (ref 10–15)
GFR SERPL CREATININE-BSD FRML MDRD: 86 ML/MIN/1.73M2
GLUCOSE BLD-MCNC: 110 MG/DL (ref 70–99)
HCT VFR BLD AUTO: 31.4 % (ref 40–53)
HGB BLD-MCNC: 10.9 G/DL (ref 13.3–17.7)
IGG SERPL-MCNC: 286 MG/DL (ref 610–1616)
IMM GRANULOCYTES # BLD: 0 10E3/UL
IMM GRANULOCYTES NFR BLD: 1 %
LYMPHOCYTES # BLD AUTO: 0 10E3/UL (ref 0.8–5.3)
LYMPHOCYTES NFR BLD AUTO: 3 %
MAGNESIUM SERPL-MCNC: 2.3 MG/DL (ref 1.6–2.3)
MCH RBC QN AUTO: 32.8 PG (ref 26.5–33)
MCHC RBC AUTO-ENTMCNC: 34.7 G/DL (ref 31.5–36.5)
MCV RBC AUTO: 95 FL (ref 78–100)
MONOCYTES # BLD AUTO: 0 10E3/UL (ref 0–1.3)
MONOCYTES NFR BLD AUTO: 2 %
NEUTROPHILS # BLD AUTO: 0.9 10E3/UL (ref 1.6–8.3)
NEUTROPHILS NFR BLD AUTO: 91 %
NRBC # BLD AUTO: 0 10E3/UL
NRBC BLD AUTO-RTO: 0 /100
PHOSPHATE SERPL-MCNC: 3.3 MG/DL (ref 2.5–4.5)
PLATELET # BLD AUTO: 64 10E3/UL (ref 150–450)
POTASSIUM BLD-SCNC: 4 MMOL/L (ref 3.4–5.3)
RBC # BLD AUTO: 3.32 10E6/UL (ref 4.4–5.9)
SODIUM SERPL-SCNC: 138 MMOL/L (ref 133–144)
WBC # BLD AUTO: 1 10E3/UL (ref 4–11)

## 2022-01-18 PROCEDURE — 80048 BASIC METABOLIC PNL TOTAL CA: CPT | Performed by: PHYSICIAN ASSISTANT

## 2022-01-18 PROCEDURE — 97165 OT EVAL LOW COMPLEX 30 MIN: CPT | Mod: GO

## 2022-01-18 PROCEDURE — 250N000011 HC RX IP 250 OP 636: Performed by: INTERNAL MEDICINE

## 2022-01-18 PROCEDURE — 206N000001 HC R&B BMT UMMC

## 2022-01-18 PROCEDURE — 83735 ASSAY OF MAGNESIUM: CPT | Performed by: INTERNAL MEDICINE

## 2022-01-18 PROCEDURE — 85025 COMPLETE CBC W/AUTO DIFF WBC: CPT | Performed by: PHYSICIAN ASSISTANT

## 2022-01-18 PROCEDURE — 97530 THERAPEUTIC ACTIVITIES: CPT | Mod: GO

## 2022-01-18 PROCEDURE — 99233 SBSQ HOSP IP/OBS HIGH 50: CPT | Performed by: INTERNAL MEDICINE

## 2022-01-18 PROCEDURE — 250N000013 HC RX MED GY IP 250 OP 250 PS 637: Performed by: PHYSICIAN ASSISTANT

## 2022-01-18 PROCEDURE — 84100 ASSAY OF PHOSPHORUS: CPT | Performed by: INTERNAL MEDICINE

## 2022-01-18 PROCEDURE — 97110 THERAPEUTIC EXERCISES: CPT | Mod: GO

## 2022-01-18 RX ORDER — PROCHLORPERAZINE MALEATE 5 MG
5 TABLET ORAL 2 TIMES DAILY PRN
Status: DISCONTINUED | OUTPATIENT
Start: 2022-01-18 | End: 2022-01-24 | Stop reason: HOSPADM

## 2022-01-18 RX ORDER — PROCHLORPERAZINE MALEATE 5 MG
5 TABLET ORAL 2 TIMES DAILY
Status: DISCONTINUED | OUTPATIENT
Start: 2022-01-18 | End: 2022-01-22

## 2022-01-18 RX ADMIN — LEVOFLOXACIN 250 MG: 250 TABLET, FILM COATED ORAL at 08:28

## 2022-01-18 RX ADMIN — Medication 2 ML: at 05:00

## 2022-01-18 RX ADMIN — OXYCODONE HYDROCHLORIDE 5 MG: 5 TABLET ORAL at 00:09

## 2022-01-18 RX ADMIN — ACYCLOVIR 800 MG: 800 TABLET ORAL at 08:28

## 2022-01-18 RX ADMIN — ACYCLOVIR 800 MG: 800 TABLET ORAL at 00:09

## 2022-01-18 RX ADMIN — PROCHLORPERAZINE MALEATE 5 MG: 5 TABLET ORAL at 18:55

## 2022-01-18 RX ADMIN — PROCHLORPERAZINE MALEATE 5 MG: 5 TABLET ORAL at 15:45

## 2022-01-18 RX ADMIN — FLUCONAZOLE 100 MG: 100 TABLET ORAL at 08:27

## 2022-01-18 RX ADMIN — TOLTERODINE 4 MG: 4 CAPSULE, EXTENDED RELEASE ORAL at 08:27

## 2022-01-18 RX ADMIN — PROCHLORPERAZINE MALEATE 5 MG: 5 TABLET ORAL at 08:26

## 2022-01-18 RX ADMIN — PROCHLORPERAZINE MALEATE 5 MG: 5 TABLET ORAL at 00:14

## 2022-01-18 RX ADMIN — ALLOPURINOL 300 MG: 300 TABLET ORAL at 08:28

## 2022-01-18 RX ADMIN — ACYCLOVIR 800 MG: 800 TABLET ORAL at 20:23

## 2022-01-18 RX ADMIN — PROCHLORPERAZINE MALEATE 5 MG: 5 TABLET ORAL at 15:46

## 2022-01-18 RX ADMIN — OMEPRAZOLE 20 MG: 20 CAPSULE, DELAYED RELEASE ORAL at 08:28

## 2022-01-18 RX ADMIN — OMEPRAZOLE 20 MG: 20 CAPSULE, DELAYED RELEASE ORAL at 18:55

## 2022-01-18 ASSESSMENT — ACTIVITIES OF DAILY LIVING (ADL)
ADLS_ACUITY_SCORE: 4
PREVIOUS_RESPONSIBILITIES: MEAL PREP;HOUSEKEEPING;SHOPPING;LAUNDRY;YARDWORK;MEDICATION MANAGEMENT;FINANCES;DRIVING
ADLS_ACUITY_SCORE: 4

## 2022-01-18 ASSESSMENT — MIFFLIN-ST. JEOR: SCORE: 1819.27

## 2022-01-18 NOTE — PROGRESS NOTES
Skin double check completed by: Clarisse Newell RN and Iona Funes RN. Patient has one reddened spot on his back that has been there for awhile. Some generalized skin discoloration noted. No other areas of concern.

## 2022-01-18 NOTE — PROGRESS NOTES
"BMT/Cell Therapy Daily Progress Note   01/18/2022    Patient ID:  Juvenal Blake is a 56 year old male, currently day +1 s/p Yescarta CAR-T.     Diagnosis NHLFL Non-Hodgkin lymphoma, Follicular, predominantly large cell  BMTCT Type Cellular Therapy    Prep Regimen Fludarabine, Cytoxan  Donor Source Autologous     GVHD Prophylaxis NA  Primary BMT MD Dr. Irwin    Clinical Trials   2017-45         Admission date: 1/17/2022    INTERVAL  HISTORY   Mild HA during Yescarta infusion yesterday. Mild intermittent nausea without emesis. Admission screening C.dif was positive but he denies diarrhea. No fevers or bleeding.    Review of Systems: 10 point ROS negative except as noted above.      PHYSICAL EXAM     KPS:  90    /77 (BP Location: Right arm)   Pulse 69   Temp 97.4  F (36.3  C) (Axillary)   Resp 16   Ht 1.715 m (5' 7.52\")   Wt 102.7 kg (226 lb 6.4 oz)   SpO2 98%   BMI 34.92 kg/m        General: NAD   Eyes: sclera anicteric   Nose/Mouth/Throat: OP moist, no ulcerations   Lungs: CTA bilaterally  Cardiovascular: RRR, no M/R/G   Abdominal/Rectal: +BS, soft, NT, obese, No HSM   Lymphatics: trace LE edema  Skin: No rash  Neuro: non-focal  Access: L arm PICC dressing cdi. R chest PAC.     LABS AND IMAGING: I have assessed all abnormal lab values for their clinical significance and any values considered clinically significant have been addressed in the assessment and plan.       Lab Results   Component Value Date    WBC 1.0 (L) 01/18/2022    ANEU 0.6 (L) 09/08/2021    HGB 10.9 (L) 01/18/2022    HCT 31.4 (L) 01/18/2022    PLT 64 (L) 01/18/2022     01/18/2022    POTASSIUM 4.0 01/18/2022    CHLORIDE 105 01/18/2022    CO2 26 01/18/2022     (H) 01/18/2022    BUN 17 01/18/2022    CR 1.01 01/18/2022    MAG 2.3 01/18/2022    INR 1.03 01/17/2022    BILITOTAL 0.6 01/17/2022    AST 23 01/17/2022    ALT 44 01/17/2022    ALKPHOS 90 01/17/2022    PROTTOTAL 6.6 (L) 01/17/2022    ALBUMIN 3.5 01/17/2022 "       SYSTEMS-BASED ASSESSMENT AND PLAN      Juvenal Blake is a 56 year old male PMH significant for refractory NHL, Day +1 of Yescarta CAR-T therapy     1. Follicular Lymphoma/Yescarta CAR-T:   Relapsed after BR, [facility], [facility] NK (9/1/20), idelalisib (~4-6/2020),  (6/28/21), polatuzumab (12/21/21). (hx Rituxan reaction).   - LD chemo with fludarabine/Cytoxan 1/12-1/14/22 prior to CAR-T infusion.    - cont allopurinol  - Day 0 Yescarta CAR-T 1/17/22     2. HEME: Keep hgb >7g, plt>10.  #pancytopenia 2/2 chemo/lymphoma.      3. ID: Afebrile.  #hx parainfluenza 3 (12/21/21): Occasional cough, otherwise sx improved.  #hx rhinovirus (10/6/21).     #Prophy: acyclovir, fluconazole, Levaquin-started on admission. Inhaled Pentamidine 1/13/21.       #Hx C diff colitis. Screening C.dif + on admission. Asymptomatic. If develops diarrhea, will Rx Vanco.     #S/p 3 covid shots; first two were pre- and booster was after.  Defer to car -t team regarding if revaccination needed post CART.      4. GI:    #JACQUE: schedule Compazine bid (8a, 6p per PTA routine). Additional Compazine, Ativan available prn.   - cont PTA omeprazole  -  medical cannibis.  #Abdominal Pain secondary to cancer. No complaints currently.      5. Renal/FEN: Creat, lytes wnl.  - replete prn per ss     6. CV:    Cardiology cleared him to proceed with no further testing. The CT angiogram showed no lesion requiring stenting or bypass.     7. :   #hx Prostatic adenocarcinoma: s/p radical prostatectomy and bilateral lymph node dissection 11/2017. Completed radiation to the prostate fossa and pelvic lymph nodes at [facility] early May 2018. He received 4500 cGy in 25 fractions. He then had 2340 cGy boost in 13 fractions to the prostatic fossa, for a total dose of 6240 cGy in 38 treatment fractions delivered over 54 days. He did not receive hormonal therapy:    9/10 PSA: 0.71 - no concerns.     Dispo: remain admitted through engraftment.    Jossie  MAL Cason  [phone]

## 2022-01-18 NOTE — PLAN OF CARE
Physical Therapy: Cancel/defer, Orders received. Chart reviewed and discussed with care team.? Physical Therapy not indicated due to pt indep with transfers and gait.? Defer discharge recommendations to OT who is following for mobility and ADLs in setting of CAR-T.? Will complete orders.     Entered by: Maryana León 01/18/2022 3:11 PM

## 2022-01-18 NOTE — PLAN OF CARE
Patient with no complaints beyond some nausea with breakfast and after his lunch. Given compazine with breakfast and after his lunch. Up ad ludin in room , watching his movies from home. Walked in the halls this afternoon. Denies any headache today when asked.

## 2022-01-18 NOTE — PROGRESS NOTES
BMT CLINICAL SOCIAL WORK NOTE:    Focus: Supportive Counseling/Resources/Discharge Planning    Data: Juvenal Blake is a 56 year old male, currently day 2 s/p Yescarta CAR-T.    Interventions: Clinical  (CSW) met with Pt at bedside to assess coping, provide supportive counseling and assist with resources as needed. BERNIE assisted w/ coordination of notary for HCD that was submitted to [employer] and added to his EMR today. Pt was provided a paper copy of the Chris Application per his request. Pt [facility] committee until he completes a new financial worksheet as the previous one submitted is incorrect. SW assured that that would be no problem at all. CSW encouraged Pt to contact CSW for support, questions and/or resources.     Assessment: Pt presented as pleasant, calm, and engaged.  Pt appears to be coping appropriately at this time. Pt continues to be supported by his wife.     Plan: CSW will continue to provide supportive counseling and assistance with resources as needed. CSW will continue to collaborate with multidisciplinary team regarding Pt's plan of care.     CITLALLI Cunningham, [facility]  Adult Blood & Marrow Transplant   Phone: [phone]  Pager: [phone]

## 2022-01-18 NOTE — PLAN OF CARE
AVSS on room air. Pt denies nausea. Fair appetite. Complained of headache that was relieved with 5mg oxy. Independent in room and cooperative of cares. Received CAR-T cells this afternoon, post flush completed. Pt denies complaints or concerns.  Asymptomatic admission c.diff positive.     Problem: Adult Inpatient Plan of Care  Goal: Plan of Care Review  Outcome: No Change  Flowsheets (Taken 1/17/2022 7732)  Plan of Care Reviewed With:   patient   spouse  Progress: no change  Goal: Optimal Comfort and Wellbeing  Outcome: No Change

## 2022-01-18 NOTE — PLAN OF CARE
"/69 (BP Location: Right arm)   Pulse 62   Temp 97.7  F (36.5  C) (Oral)   Resp 16   Ht 1.715 m (5' 7.52\")   Wt 102.7 kg (226 lb 6.4 oz)   SpO2 99%   BMI 34.92 kg/m      Vitally stable overnight, remains on room air. Patient alert & oriented x4 throughout the night. Patient got x1 tylenol and x1 5mg oxy for headache, but pain reassessment patient said headache was better. X1 oral compazine given for intermittent nausea, patient feeling better after dose. Skin double check completed by writer and Iona Funes RN. Up independently in room. Good urine output. Patient able to sleep on and off between cares. Using home CPAP at night for sleep. Will continue with current plan of care.         Problem: Adult Inpatient Plan of Care  Goal: Plan of Care Review  Outcome: No Change     Problem: Adult Inpatient Plan of Care  Goal: Patient-Specific Goal (Individualized)  Outcome: No Change     Problem: Adult Inpatient Plan of Care  Goal: Absence of Hospital-Acquired Illness or Injury  Outcome: No Change     Problem: Adult Inpatient Plan of Care  Goal: Absence of Hospital-Acquired Illness or Injury  Intervention: Identify and Manage Fall Risk  Recent Flowsheet Documentation  Taken 1/17/2022 2113 by Clarisse Newell RN  Safety Promotion/Fall Prevention:    assistive device/personal items within reach    clutter free environment maintained    fall prevention program maintained    nonskid shoes/slippers when out of bed    patient and family education     "

## 2022-01-18 NOTE — PROGRESS NOTES
"BMT Daily CARTOX Note (complete days 0-14 and with any subsequent CRS/neurotoxicity)    Date: January 18, 2022    Juvenal Blaek ([id number]) is a 56 year old year old male who received infusion on 1/17/22, currently day 1 of CAR-T cell therapy Yescarta    Vital Signs: /79 (BP Location: Right arm)   Pulse 74   Temp 97.5  F (36.4  C) (Axillary)   Resp 16   Ht 1.715 m (5' 7.52\")   Wt 102.7 kg (226 lb 6.4 oz)   SpO2 99%   BMI 34.92 kg/m      1) CRS Grading (based ONLY on parameters in box below)    Fever:   </= 100.4    Blood pressure:   SBP >/= 90 (not hypotensive)    Oxygen saturation:    >/= 90% on room air (not hypoxic)    CRS Parameter Grade 1  Grade 2 Grade 3 Grade 4   Fever* Tm >= 100.4 degrees F Tm >= 100.4 degrees F Tm >= 100.4 degrees F Tm >= to 100.4  degrees F      With Either:  Hypotension (SBP <90) None Responsive to Fluids  Requiring 1  vasopressor (w/ or w/o vasopressin) Requiring multiple  vasopressors  (excluding  vasopressin)     And/or  Hypoxia (O2 sats <90% on room air) None Low-flow nasal  cannula or blow-by High-flow nasal  cannula, face mask,  non-rebreather mask,or Venturi mask  Requiring positive  pressure (CPAP,  BiPAP intubation and  mechanical  ventilation)     CRS Summary  Does patient have CRS? No  Current CRS stgstrstastdstest:st st1st What therapy was given: na  Has CRS grade changed? na  Has CRS resolved? na      2) Neurotoxicity:    ICE Assessment  Orientation to year, month, city, hospital: 4 points, Name 3 objects (e.g., point to clock, pen, button): 3 point, Following commands: (e.g., Show me 2 fingers): 1 point, Write a standard sentence (e.g., The tang jumped over the log): 1 point and Count backwards from 100 by ten: 1 point  Total points: 10: No impairment    ASTCT ICANS Consensus Grading for Adults  ICE Score   10    Seizure   No seizures    Motor Findings   No motor findings    Elevated ICP/Cerebral Edema   None      Neurotoxicity  Domain Grade 1 Grade 2 Grade 3 Grade 4   ICE " score 7-9 3-6 1-2 0   Depressed LOC      Awakens  spontaneously Awakens to  voice  Awakens only to  tactile stimulation Unarousable or  requires vigorous  or repetitive  tactile stimuli to  arouse. Stupor  or coma.   Seizure n/a n/a Any clinical  seizure focal or  generalized that  resolves rapidly  or nonconvulsive  seizures on EEG  that resolve with  intervention  Life-threatening  prolonged  seizure (>5 min);  or Repetitive  clinical or  electrical  seizures without  return to baseline  in between    Motor Findings      n/a n/a n/a Deep focal motor  weakness such  as hemiparesis  or paraparesis   Elevated  ICP/cerebral  edema  n/a n/a Focal/local  edema on  neuroimaging  Diffuse cerebral  edema on  neuroimaging;  decerebrate or  decorticate  posturing; or  cranial nerve VI  palsy; or  papilledema; or  Cushing's triad     ICANS grade is determined by the most severe event (ICE score, level of consciousness, seizure, motor findings, raised ICP/cerebral edema) not attributable to any other cause; for example, a patient with an ICE score of 3 who has a generalized seizure is classified as grade 3 ICANS.    A patient with an ICE score of 0 may be classified as grade 3 ICANS if awake with global aphasia, but a patient with an ICE score of 0 may be classified as grade 4 ICANS if unarousable.     Depressed level of consciousness should be attributable to no other cause (eg, no sedating medication)    Tremors and myoclonus associated with immune effector cell therapies may be graded according to CTCAE v5.0,but they do not influence ICANS grading.     Intracranial hemorrhage with or without associated edema is not considered a neurotoxicity feature and is  excluded from ICANS grading. It may be graded according to CTCAE v5.0.          Neurotoxicity Summary  Does patient have neurotoxicity? No  Current Neurotoxicity score (0-10): 0  What therapy was given: na  Has neurotoxicity grade changed? na  Has neurotoxicity resolved?  na      3) Organ CAR-T toxicity:    Cardiac   none    Respiratory   none    Gastrointestinal   none    Hepatic    none    Skin   none     Coagulopathy    none     Other: no      4) HLH   no     * CTCAE grading can be found at   https://ctep.cancer.gov/protocoldevelopment/electronic_applications/docs/CTCAE_v5_Quick Reference_8.5x11.pdf    Jossie Cason PA-C  [phone]

## 2022-01-18 NOTE — PROCEDURES
BMT Post Infusion Documentation    Data   Patient Vitals for the past 72 hrs:   Temp Temp src Pulse Resp BP   01/17/22 1035 97.8  F (36.6  C) Oral 71 16 123/82   01/17/22 1409 97.8  F (36.6  C) Oral 67 16 115/73   01/17/22 1446 97.8  F (36.6  C) Oral 66 16 110/67   01/17/22 1500 98.2  F (36.8  C) Oral 66 16 119/68   01/17/22 1515 98.6  F (37  C) Oral 66 16 110/75   01/17/22 1530 97.8  F (36.6  C) Oral 61 16 110/74   01/17/22 1546 98.2  F (36.8  C) Oral 63 16 119/65   01/17/22 1600 98  F (36.7  C) Oral 62 16 111/66   01/17/22 1655 98.1  F (36.7  C) Oral 64 16 103/74   01/17/22 2113 97.8  F (36.6  C) Oral 67 16 119/72   01/18/22 0010 97.7  F (36.5  C) Oral 67 16 127/71   01/18/22 0451 97.7  F (36.5  C) Oral 62 16 125/69   01/18/22 0834 97.5  F (36.4  C) Axillary 74 16 129/79     BMT INFUSION DOCUMENTATION (last 24 hours)     BMT/Cellular Product Infusion     Row Name                  Cell Therapy Documentation    Product Release Date --       Recipient Study ID --       Donor --       Donor MRN/ID --       Donor ABO/Rh --       Allogeneic Donor Eligibility Determination and Summary of Records --       Type of Infusion --       Total Volume Dispensed (mL) --       Total NC Dose --       Total CD34 Dose --       Total CD3 dose --       Total NC Dose Left in Storage --       Comments for Product Issues --       Donor ABO/Rh --       Product Types --       Product Numbers --       Product Types and Numbers --       Volume --       ABO Mismatch --       ZZTotal NC Dose --       ZZTotal CD34 Dose --       ZZTotal NC Dose Left in Storage --                [REMOVED] Product 01/17/22 1038 T Cells, Apheresis, [id number]    Product Details Product Release Date: 01/17/22  -NB Product Release Time: 1038  -JK Product Type: T Cells, Apheresis, [id number]  -NB DIN: [id number]  -NB Product Description Code: [id number]  -NB Volume Dispensed (mL): 68 mL  -NB Completion Date (RN to complete): 01/17/22  -GT Completion Time (RN to complete):  1505  -GT       Checked by (Patient RN) --       Checked by (Witness) --       Product Volume Infused (mL) --       Flush Volume (mL) --       Volume Dispensed (mL) --                RN Documentation    Patient was premedicated as ordered --       Line Type --       Patient Stable Prior to Infusion --       Time Infusion Started --       Checked by (Patient RN) --       Checked by (RN 2) --       Broken Bag? --       Immediate suspected transfusion reaction to the product --       Time Infusion Stopped --       Total Flush Volume (mL) --       Checked by (Witness) --       Date Infusion Started --       Date Infusion Stopped --       Volume Infused (mL) --       Total Volume Infused (cc) --                Patient tolerance of product infusion    Immediate suspected transfusion reaction to the product --       Did patient have prior history of similar signs/symptoms during this hospitalization? --       Symptoms during/after infusion --       Did the patient tolerate the infusion well --       Medications and treatment for symptoms --       Did the symptoms resolve? --       Enter comments if clots, leaks, broken bag, infusion delays, other issues with bag/infusion --       Describe symptoms --             User Key  (r) = Recorded By, (t) = Taken By, (c) = Cosigned By    Initials Name Effective Dates    GT Cody Hathawayjavad RODRIGUEZ RN 04/29/14 -     Rox Rush 02/20/20 -     Dimple Nice 07/11/21 -                   Post-Infusion Evaluation:   Infusion Related Reaction: Grade 1 - Mild transient reaction; infusion interruption not indicated; intervention not indicated  Dyspnea: Grade 0 - none  Hypoxia: Grade 0 - not present  Fever: Grade 0 - afebrile  Chills: Grade 0 - none  Febrile Neutropenia: Grade 0 - not present  Sinus Bradycardia: Grade 0 - none  Hypertension: Grade 1 - prehypertension (systolic -139 mm Hg or diastolic BP 80-89 mm Hg)  Hypotension: Grade 0 - none  Chest Pain: Grade 0 -  none  Bronchospasm: Grade 0 - none  Pain: Grade 0 - none  Rash: Grade 0 - None  Neurologic Specify: headache; mild (2/10) - resolved with oxycodone 5mg x1       Jossie Cason PA-C

## 2022-01-18 NOTE — PROGRESS NOTES
01/18/22 1016   Quick Adds   Type of Visit Initial Occupational Therapy Evaluation   Living Environment   Current Living Arrangements house   Home Accessibility stairs within home   Number of Stairs, Within Home, Primary other (see comments)  (flight to basement)   Transportation Anticipated family or friend will provide   Living Environment Comments Pt lives in a house with his wife. All needs can be met on the main level, but there is a living room in the basement where pt often hangs out. Pt reports having a jacKeelvari bathtub.    Self-Care   Usual Activity Tolerance good   Current Activity Tolerance good   Regular Exercise No   Equipment Currently Used at Home none   Activity/Exercise/Self-Care Comment Pt is IND with all ADLs and mobility. Pt enjoys gardening and lawn care and has a treadmill but does not frequently use. Pt reports he becomes SOB easily w/ activity and often becomes nauseous, limiting the amount of exercise he is able to do.    Instrumental Activities of Daily Living (IADL)   Previous Responsibilities meal prep;housekeeping;shopping;laundry;yardwork;medication management;finances;driving   IADL Comments Pt is IND with IADLs at baseline; shares responsibility with his wife. Pt is retired.    Disability/Function   Hearing Difficulty or Deaf no   Wear Glasses or Blind yes   Vision Management reading glasses   Concentrating, Remembering or Making Decisions Difficulty no   Difficulty Communicating no   Difficulty Eating/Swallowing no   Walking or Climbing Stairs Difficulty no   Dressing/Bathing Difficulty no   Toileting issues no   Doing Errands Independently Difficulty (such as shopping) no   Fall history within last six months no   Change in Functional Status Since Onset of Current Illness/Injury no   General Information   Onset of Illness/Injury or Date of Surgery 01/17/22   Referring Physician Rosa Terry MD   Patient/Family Therapy Goal Statement (OT) To return home    Additional  Occupational Profile Info/Pertinent History of Current Problem Per chart: Juvenal Blake is a 56 year old male PMH significant for refractory NHLS/p , bridging chemo with tod (without rituxan).  Now getting LD chemo in prep for Yescarta   Existing Precautions/Restrictions immunosuppressed   Left Upper Extremity (Weight-bearing Status) full weight-bearing (FWB)   Right Upper Extremity (Weight-bearing Status) full weight-bearing (FWB)   Left Lower Extremity (Weight-bearing Status) full weight-bearing (FWB)   Right Lower Extremity (Weight-bearing Status) full weight-bearing (FWB)   General Observations and Info activity: up ad ludin   Cognitive Status Examination   Orientation Status orientation to person, place and time   Visual Perception   Visual Impairment/Limitations corrective lenses for reading   Sensory   Sensory Comments Pt reports intermittent tingling in B hands and feet at baseline   Pain Assessment   Patient Currently in Pain No   Integumentary/Edema   Integumentary/Edema no deficits were identifed   Posture   Posture not impaired   Range of Motion Comprehensive   General Range of Motion bilateral upper extremity ROM WFL   Strength Comprehensive (MMT)   General Manual Muscle Testing (MMT) Assessment no strength deficits identified   Muscle Tone Assessment   Muscle Tone Quick Adds No deficits were identified   Coordination   Upper Extremity Coordination No deficits were identified   Gross Motor Coordination No deficits were identified   Bed Mobility   Comment (Bed Mobility) IND   Transfers   Transfer Comments IND   Activities of Daily Living   BADL Assessment No deficits identified   Clinical Impression   Criteria for Skilled Therapeutic Interventions Met (OT) yes;meets criteria   OT Diagnosis Pt at risk for decreased activity tolerance and deconditioning 2/2 BMT admission    OT Problem List-Impairments impacting ADL problems related to;activity tolerance impaired;strength;other (see comments)   ADL  comments/analysis Anticipate deconditioning 2/2 IP hospital stay and medical tx   Assessment of Occupational Performance 1-3 Performance Deficits   Identified Performance Deficits community mobility, home mgmt   Planned Therapy Interventions (OT) strengthening;home program guidelines;progressive activity/exercise;risk factor education   Clinical Decision Making Complexity (OT) low complexity   Therapy Frequency (OT) 2x/week   Predicted Duration of Therapy 1 week   Anticipated Equipment Needs Upon Discharge (OT) other (see comments)  (TBD )   Risk & Benefits of therapy have been explained evaluation/treatment results reviewed;care plan/treatment goals reviewed;risks/benefits reviewed;current/potential barriers reviewed;participants voiced agreement with care plan;participants included;patient   Comment-Clinical Impression Pt at risk for decreased activity tolerance and deconditioning 2/2 BMT admission. Will benefit from skilled OT to maintain strength and functional endurance   OT Discharge Planning    OT Discharge Recommendation (DC Rec) Home with assist   OT Rationale for DC Rec Anticipate pt will be safe to return home w/ assist from wife prn.    OT Brief overview of current status  IND   Total Evaluation Time (Minutes)   Total Evaluation Time (Minutes) 5

## 2022-01-19 LAB
ANION GAP SERPL CALCULATED.3IONS-SCNC: 6 MMOL/L (ref 3–14)
BACTERIA SPEC CULT: NORMAL
BASOPHILS # BLD AUTO: 0 10E3/UL (ref 0–0.2)
BASOPHILS NFR BLD AUTO: 1 %
BUN SERPL-MCNC: 21 MG/DL (ref 7–30)
CALCIUM SERPL-MCNC: 9.3 MG/DL (ref 8.5–10.1)
CHLORIDE BLD-SCNC: 105 MMOL/L (ref 94–109)
CO2 SERPL-SCNC: 28 MMOL/L (ref 20–32)
CREAT SERPL-MCNC: 1 MG/DL (ref 0.66–1.25)
EOSINOPHIL # BLD AUTO: 0.1 10E3/UL (ref 0–0.7)
EOSINOPHIL NFR BLD AUTO: 5 %
ERYTHROCYTE [DISTWIDTH] IN BLOOD BY AUTOMATED COUNT: 13 % (ref 10–15)
GFR SERPL CREATININE-BSD FRML MDRD: 87 ML/MIN/1.73M2
GLUCOSE BLD-MCNC: 116 MG/DL (ref 70–99)
HCT VFR BLD AUTO: 27.4 % (ref 40–53)
HGB BLD-MCNC: 9.5 G/DL (ref 13.3–17.7)
IMM GRANULOCYTES # BLD: 0 10E3/UL
IMM GRANULOCYTES NFR BLD: 2 %
LYMPHOCYTES # BLD AUTO: 0.1 10E3/UL (ref 0.8–5.3)
LYMPHOCYTES NFR BLD AUTO: 6 %
MAGNESIUM SERPL-MCNC: 2.3 MG/DL (ref 1.6–2.3)
MCH RBC QN AUTO: 32.5 PG (ref 26.5–33)
MCHC RBC AUTO-ENTMCNC: 34.7 G/DL (ref 31.5–36.5)
MCV RBC AUTO: 94 FL (ref 78–100)
MONOCYTES # BLD AUTO: 0.1 10E3/UL (ref 0–1.3)
MONOCYTES NFR BLD AUTO: 4 %
NEUTROPHILS # BLD AUTO: 1.1 10E3/UL (ref 1.6–8.3)
NEUTROPHILS NFR BLD AUTO: 82 %
NRBC # BLD AUTO: 0 10E3/UL
NRBC BLD AUTO-RTO: 0 /100
PHOSPHATE SERPL-MCNC: 3.4 MG/DL (ref 2.5–4.5)
PLATELET # BLD AUTO: 70 10E3/UL (ref 150–450)
POTASSIUM BLD-SCNC: 4 MMOL/L (ref 3.4–5.3)
RBC # BLD AUTO: 2.92 10E6/UL (ref 4.4–5.9)
SODIUM SERPL-SCNC: 139 MMOL/L (ref 133–144)
WBC # BLD AUTO: 1.3 10E3/UL (ref 4–11)

## 2022-01-19 PROCEDURE — 86850 RBC ANTIBODY SCREEN: CPT | Performed by: PHYSICIAN ASSISTANT

## 2022-01-19 PROCEDURE — 86901 BLOOD TYPING SEROLOGIC RH(D): CPT | Performed by: PHYSICIAN ASSISTANT

## 2022-01-19 PROCEDURE — 82374 ASSAY BLOOD CARBON DIOXIDE: CPT | Performed by: PHYSICIAN ASSISTANT

## 2022-01-19 PROCEDURE — 250N000011 HC RX IP 250 OP 636: Performed by: INTERNAL MEDICINE

## 2022-01-19 PROCEDURE — 99233 SBSQ HOSP IP/OBS HIGH 50: CPT | Performed by: INTERNAL MEDICINE

## 2022-01-19 PROCEDURE — 206N000001 HC R&B BMT UMMC

## 2022-01-19 PROCEDURE — 85025 COMPLETE CBC W/AUTO DIFF WBC: CPT | Performed by: PHYSICIAN ASSISTANT

## 2022-01-19 PROCEDURE — 250N000013 HC RX MED GY IP 250 OP 250 PS 637: Performed by: PHYSICIAN ASSISTANT

## 2022-01-19 PROCEDURE — 83735 ASSAY OF MAGNESIUM: CPT | Performed by: PHYSICIAN ASSISTANT

## 2022-01-19 PROCEDURE — 84100 ASSAY OF PHOSPHORUS: CPT | Performed by: INTERNAL MEDICINE

## 2022-01-19 RX ADMIN — Medication 2 ML: at 17:57

## 2022-01-19 RX ADMIN — FLUCONAZOLE 100 MG: 100 TABLET ORAL at 09:13

## 2022-01-19 RX ADMIN — ACYCLOVIR 800 MG: 800 TABLET ORAL at 20:28

## 2022-01-19 RX ADMIN — PROCHLORPERAZINE MALEATE 5 MG: 5 TABLET ORAL at 17:56

## 2022-01-19 RX ADMIN — OMEPRAZOLE 20 MG: 20 CAPSULE, DELAYED RELEASE ORAL at 17:06

## 2022-01-19 RX ADMIN — LEVOFLOXACIN 250 MG: 250 TABLET, FILM COATED ORAL at 09:13

## 2022-01-19 RX ADMIN — OMEPRAZOLE 20 MG: 20 CAPSULE, DELAYED RELEASE ORAL at 09:22

## 2022-01-19 RX ADMIN — Medication 4 ML: at 04:44

## 2022-01-19 RX ADMIN — PROCHLORPERAZINE MALEATE 5 MG: 5 TABLET ORAL at 09:13

## 2022-01-19 RX ADMIN — Medication 4 ML: at 20:29

## 2022-01-19 RX ADMIN — TOLTERODINE 4 MG: 4 CAPSULE, EXTENDED RELEASE ORAL at 09:22

## 2022-01-19 RX ADMIN — ACYCLOVIR 800 MG: 800 TABLET ORAL at 09:13

## 2022-01-19 RX ADMIN — PROCHLORPERAZINE MALEATE 5 MG: 5 TABLET ORAL at 00:08

## 2022-01-19 RX ADMIN — ALLOPURINOL 300 MG: 300 TABLET ORAL at 09:13

## 2022-01-19 RX ADMIN — OXYCODONE HYDROCHLORIDE 5 MG: 5 TABLET ORAL at 00:08

## 2022-01-19 ASSESSMENT — ACTIVITIES OF DAILY LIVING (ADL)
ADLS_ACUITY_SCORE: 4

## 2022-01-19 ASSESSMENT — MIFFLIN-ST. JEOR: SCORE: 1814.73

## 2022-01-19 NOTE — PLAN OF CARE
Pt received compazine for nausea prevention (denied nausea) but had no appetite.  Ate fair.  Denied pain.    Problem: Adult Inpatient Plan of Care  Goal: Plan of Care Review  Outcome: No Change

## 2022-01-19 NOTE — PROGRESS NOTES
"BMT/Cell Therapy Daily Progress Note   01/19/2022    Patient ID:  Juvenal Blake is a 56 year old male, currently day +2 s/p [facility]a CAR-T.     Diagnosis NHLFL Non-Hodgkin lymphoma, Follicular, predominantly large cell  BMTCT Type Cellular Therapy    Prep Regimen Fludarabine, Cytoxan  Donor Source Autologous     GVHD Prophylaxis NA  Primary BMT MD Dr. Irwin    Clinical Trials   2017-45         Admission date: 1/17/2022    INTERVAL  HISTORY   Feeling ok. Mild intermittent HA. Mild intermittent nausea without emesis. Stools are soft/loose, not watery. No fevers or bleeding.    Review of Systems: 10 point ROS negative except as noted above.      PHYSICAL EXAM     KPS:  90    /75 (BP Location: Right arm)   Pulse 75   Temp 98  F (36.7  C) (Oral)   Resp 18   Ht 1.715 m (5' 7.52\")   Wt 102.8 kg (226 lb 9.6 oz)   SpO2 100%   BMI 34.95 kg/m        General: NAD   Eyes: sclera anicteric   Nose/Mouth/Throat: OP moist, no ulcerations   Lungs: CTA bilaterally  Cardiovascular: RRR, no M/R/G   Abdominal/Rectal: +BS, soft, NT, obese, No HSM   Lymphatics: trace -1+ LE edema  Skin: No rash  Neuro: non-focal  Access: L arm PICC dressing cdi. R chest PAC.     LABS AND IMAGING: I have assessed all abnormal lab values for their clinical significance and any values considered clinically significant have been addressed in the assessment and plan.       Lab Results   Component Value Date    WBC 1.3 (L) 01/19/2022    ANEU 0.6 (L) 09/08/2021    HGB 9.5 (L) 01/19/2022    HCT 27.4 (L) 01/19/2022    PLT 70 (L) 01/19/2022     01/19/2022    POTASSIUM 4.0 01/19/2022    CHLORIDE 105 01/19/2022    CO2 28 01/19/2022     (H) 01/19/2022    BUN 21 01/19/2022    CR 1.00 01/19/2022    MAG 2.3 01/19/2022    INR 1.03 01/17/2022    BILITOTAL 0.6 01/17/2022    AST 23 01/17/2022    ALT 44 01/17/2022    ALKPHOS 90 01/17/2022    PROTTOTAL 6.6 (L) 01/17/2022    ALBUMIN 3.5 01/17/2022       SYSTEMS-BASED ASSESSMENT AND PLAN      Juvenal MCGRAW" Hayden is a 56 year old male PMH significant for refractory NHL, Day +2 of [facility]a CAR-T therapy     1. Follicular Lymphoma/[facility]a CAR-T:   Relapsed after BR, [facility], [facility]AM NK (9/1/20), idelalisib (~4-6/2020),  (6/28/21), polatuzumab (12/21/21). (hx Rituxan reaction).   - LD chemo with fludarabine/Cytoxan 1/12-1/14/22 prior to CAR-T infusion.    - cont allopurinol  - Day 0 Yescarta CAR-T 1/17/22     2. HEME: Keep hgb >7g, plt>10.  #pancytopenia 2/2 chemo/lymphoma.      3. ID: Afebrile.  #hx parainfluenza 3 (12/21/21): Occasional cough, otherwise sx improved.  #hx rhinovirus (10/6/21).     #Prophy: acyclovir, fluconazole, Levaquin-started on admission. Inhaled Pentamidine 1/13/21.       #Hx C diff colitis. Screening C.dif + on admission. Asymptomatic. If develops diarrhea, will Rx Vanco.     #S/p 3 covid shots; first two were pre- and booster was after.  Defer to car -t team regarding if revaccination needed post CART.      4. GI:    #JACQUE: schedule Compazine bid (8a, 6p per PTA routine). Additional Compazine, Ativan available prn.   - cont PTA omeprazole  -  hx medical cannibis.  #Abdominal Pain secondary to cancer. No complaints currently.      5. Renal/FEN: Creat, lytes wnl.  - replete prn per sliding scale  #severe malnutrition 2/2 chronic illness/cancer (based on wt loss). Cont high arnaldo/protein diet. No current intake concerns.     6. CV:    Cardiology cleared him to proceed with no further testing. The CT angiogram showed no lesion requiring stenting or bypass.     7. :   #hx Prostatic adenocarcinoma: s/p radical prostatectomy and bilateral lymph node dissection 11/2017. Completed radiation to the prostate fossa and pelvic lymph nodes at [facility] early May 2018. He received 4500 cGy in 25 fractions. He then had 2340 cGy boost in 13 fractions to the prostatic fossa, for a total dose of 6240 cGy in 38 treatment fractions delivered over 54 days. He did not receive hormonal therapy:    9/10  PSA: 0.71 - no concerns.     Dispo: remain admitted through engraftment.    Jossie Cason PA-C  [phone]

## 2022-01-19 NOTE — PLAN OF CARE
Patient slept at intervals. Reported frontal headache beginning of night. Medicated with Oxycodone with relief. Intermittent nausea. Given Compazine. Offer antiemetics. Helpocked overnight. Drinking large amounts of water. Voiding adequate amounts of yellow urine. Afebrile. Vitals stable. Sats ok; using home cpap machine at night. Alert and oriented x 4. Quiet.

## 2022-01-19 NOTE — PROGRESS NOTES
"BMT Daily CARTOX Note (complete days 0-14 and with any subsequent CRS/neurotoxicity)    Date: January 18, 2022    Juvenal Blake ([id number]) is a 56 year old year old male who received infusion on 1/17/22, currently day 2 of CAR-T cell therapy [facility]carta    Vital Signs: /75 (BP Location: Right arm)   Pulse 75   Temp 98  F (36.7  C) (Oral)   Resp 18   Ht 1.715 m (5' 7.52\")   Wt 102.8 kg (226 lb 9.6 oz)   SpO2 100%   BMI 34.95 kg/m      1) CRS Grading (based ONLY on parameters in box below)    Fever:   </= 100.4    Blood pressure:   SBP >/= 90 (not hypotensive)    Oxygen saturation:    >/= 90% on room air (not hypoxic)    CRS Parameter Grade 1  Grade 2 Grade 3 Grade 4   Fever* Tm >= 100.4 degrees F Tm >= 100.4 degrees F Tm >= 100.4 degrees F Tm >= to 100.4  degrees F      With Either:  Hypotension (SBP <90) None Responsive to Fluids  Requiring 1  vasopressor (w/ or w/o vasopressin) Requiring multiple  vasopressors  (excluding  vasopressin)     And/or  Hypoxia (O2 sats <90% on room air) None Low-flow nasal  cannula or blow-by High-flow nasal  cannula, face mask,  non-rebreather mask,or Venturi mask  Requiring positive  pressure (CPAP,  BiPAP intubation and  mechanical  ventilation)     CRS Summary  Does patient have CRS? No  Current CRS stgstrstastdstest:st st1st What therapy was given: na  Has CRS grade changed? na  Has CRS resolved? na      2) Neurotoxicity:    ICE Assessment  Orientation to year, month, city, hospital: 4 points, Name 3 objects (e.g., point to clock, pen, button): 3 point, Following commands: (e.g., Show me 2 fingers): 1 point, Write a standard sentence (e.g., The tang jumped over the log): 1 point and Count backwards from 100 by ten: 1 point  Total points: 10: No impairment    ASTCT ICANS Consensus Grading for Adults  ICE Score   10    Seizure   No seizures    Motor Findings   No motor findings    Elevated ICP/Cerebral Edema   None      Neurotoxicity  Domain Grade 1 Grade 2 Grade 3 Grade 4   ICE score " 7-9 3-6 1-2 0   Depressed LOC      Awakens  spontaneously Awakens to  voice  Awakens only to  tactile stimulation Unarousable or  requires vigorous  or repetitive  tactile stimuli to  arouse. Stupor  or coma.   Seizure n/a n/a Any clinical  seizure focal or  generalized that  resolves rapidly  or nonconvulsive  seizures on EEG  that resolve with  intervention  Life-threatening  prolonged  seizure (>5 min);  or Repetitive  clinical or  electrical  seizures without  return to baseline  in between    Motor Findings      n/a n/a n/a Deep focal motor  weakness such  as hemiparesis  or paraparesis   Elevated  ICP/cerebral  edema  n/a n/a Focal/local  edema on  neuroimaging  Diffuse cerebral  edema on  neuroimaging;  decerebrate or  decorticate  posturing; or  cranial nerve VI  palsy; or  papilledema; or  Cushing's triad     ICANS grade is determined by the most severe event (ICE score, level of consciousness, seizure, motor findings, raised ICP/cerebral edema) not attributable to any other cause; for example, a patient with an ICE score of 3 who has a generalized seizure is classified as grade 3 ICANS.    A patient with an ICE score of 0 may be classified as grade 3 ICANS if awake with global aphasia, but a patient with an ICE score of 0 may be classified as grade 4 ICANS if unarousable.     Depressed level of consciousness should be attributable to no other cause (eg, no sedating medication)    Tremors and myoclonus associated with immune effector cell therapies may be graded according to CTCAE v5.0,but they do not influence ICANS grading.     Intracranial hemorrhage with or without associated edema is not considered a neurotoxicity feature and is  excluded from ICANS grading. It may be graded according to CTCAE v5.0.          Neurotoxicity Summary  Does patient have neurotoxicity? No  Current Neurotoxicity score (0-10): 0  What therapy was given: na  Has neurotoxicity grade changed? na  Has neurotoxicity resolved?  na      3) Organ CAR-T toxicity:    Cardiac   none    Respiratory   none    Gastrointestinal   none    Hepatic    none    Skin   none     Coagulopathy    none     Other: no      4) HLH   no     * CTCAE grading can be found at   https://ctep.cancer.gov/protocoldevelopment/electronic_applications/docs/CTCAE_v5_Quick Reference_8.5x11.pdf    Jossie Cason PA-C  [phone]

## 2022-01-20 ENCOUNTER — APPOINTMENT (OUTPATIENT)
Dept: OCCUPATIONAL THERAPY | Facility: CLINIC | Age: 57
DRG: 018 | End: 2022-01-20
Attending: INTERNAL MEDICINE
Payer: COMMERCIAL

## 2022-01-20 LAB
ABO/RH(D): NORMAL
ANION GAP SERPL CALCULATED.3IONS-SCNC: 4 MMOL/L (ref 3–14)
ANTIBODY SCREEN: NEGATIVE
BASOPHILS # BLD AUTO: 0 10E3/UL (ref 0–0.2)
BASOPHILS NFR BLD AUTO: 1 %
BUN SERPL-MCNC: 21 MG/DL (ref 7–30)
CALCIUM SERPL-MCNC: 9.6 MG/DL (ref 8.5–10.1)
CHLORIDE BLD-SCNC: 105 MMOL/L (ref 94–109)
CO2 SERPL-SCNC: 28 MMOL/L (ref 20–32)
CREAT SERPL-MCNC: 1.09 MG/DL (ref 0.66–1.25)
EOSINOPHIL # BLD AUTO: 0.1 10E3/UL (ref 0–0.7)
EOSINOPHIL NFR BLD AUTO: 4 %
ERYTHROCYTE [DISTWIDTH] IN BLOOD BY AUTOMATED COUNT: 12.8 % (ref 10–15)
GFR SERPL CREATININE-BSD FRML MDRD: 79 ML/MIN/1.73M2
GLUCOSE BLD-MCNC: 115 MG/DL (ref 70–99)
HCT VFR BLD AUTO: 27.7 % (ref 40–53)
HGB BLD-MCNC: 9.6 G/DL (ref 13.3–17.7)
IMM GRANULOCYTES # BLD: 0 10E3/UL
IMM GRANULOCYTES NFR BLD: 2 %
LYMPHOCYTES # BLD AUTO: 0.1 10E3/UL (ref 0.8–5.3)
LYMPHOCYTES NFR BLD AUTO: 8 %
MAGNESIUM SERPL-MCNC: 2.3 MG/DL (ref 1.6–2.3)
MCH RBC QN AUTO: 32.1 PG (ref 26.5–33)
MCHC RBC AUTO-ENTMCNC: 34.7 G/DL (ref 31.5–36.5)
MCV RBC AUTO: 93 FL (ref 78–100)
MONOCYTES # BLD AUTO: 0 10E3/UL (ref 0–1.3)
MONOCYTES NFR BLD AUTO: 3 %
NEUTROPHILS # BLD AUTO: 1.2 10E3/UL (ref 1.6–8.3)
NEUTROPHILS NFR BLD AUTO: 82 %
NRBC # BLD AUTO: 0 10E3/UL
NRBC BLD AUTO-RTO: 0 /100
PHOSPHATE SERPL-MCNC: 4 MG/DL (ref 2.5–4.5)
PLATELET # BLD AUTO: 64 10E3/UL (ref 150–450)
POTASSIUM BLD-SCNC: 4.2 MMOL/L (ref 3.4–5.3)
RBC # BLD AUTO: 2.99 10E6/UL (ref 4.4–5.9)
SODIUM SERPL-SCNC: 137 MMOL/L (ref 133–144)
SPECIMEN EXPIRATION DATE: NORMAL
WBC # BLD AUTO: 1.5 10E3/UL (ref 4–11)

## 2022-01-20 PROCEDURE — 250N000013 HC RX MED GY IP 250 OP 250 PS 637: Performed by: PHYSICIAN ASSISTANT

## 2022-01-20 PROCEDURE — 83735 ASSAY OF MAGNESIUM: CPT | Performed by: INTERNAL MEDICINE

## 2022-01-20 PROCEDURE — 84100 ASSAY OF PHOSPHORUS: CPT | Performed by: INTERNAL MEDICINE

## 2022-01-20 PROCEDURE — 206N000001 HC R&B BMT UMMC

## 2022-01-20 PROCEDURE — 97530 THERAPEUTIC ACTIVITIES: CPT | Mod: GO

## 2022-01-20 PROCEDURE — 85025 COMPLETE CBC W/AUTO DIFF WBC: CPT | Performed by: PHYSICIAN ASSISTANT

## 2022-01-20 PROCEDURE — 250N000011 HC RX IP 250 OP 636: Performed by: INTERNAL MEDICINE

## 2022-01-20 PROCEDURE — 97110 THERAPEUTIC EXERCISES: CPT | Mod: GO

## 2022-01-20 PROCEDURE — 80048 BASIC METABOLIC PNL TOTAL CA: CPT | Performed by: PHYSICIAN ASSISTANT

## 2022-01-20 PROCEDURE — 99233 SBSQ HOSP IP/OBS HIGH 50: CPT | Performed by: INTERNAL MEDICINE

## 2022-01-20 RX ADMIN — LEVOFLOXACIN 250 MG: 250 TABLET, FILM COATED ORAL at 08:30

## 2022-01-20 RX ADMIN — ALLOPURINOL 300 MG: 300 TABLET ORAL at 08:30

## 2022-01-20 RX ADMIN — PROCHLORPERAZINE MALEATE 5 MG: 5 TABLET ORAL at 08:29

## 2022-01-20 RX ADMIN — OXYCODONE HYDROCHLORIDE 5 MG: 5 TABLET ORAL at 17:56

## 2022-01-20 RX ADMIN — ACYCLOVIR 800 MG: 800 TABLET ORAL at 21:36

## 2022-01-20 RX ADMIN — OMEPRAZOLE 20 MG: 20 CAPSULE, DELAYED RELEASE ORAL at 08:30

## 2022-01-20 RX ADMIN — LORAZEPAM 0.5 MG: 0.5 TABLET ORAL at 21:36

## 2022-01-20 RX ADMIN — PROCHLORPERAZINE MALEATE 5 MG: 5 TABLET ORAL at 03:43

## 2022-01-20 RX ADMIN — ACETAMINOPHEN 650 MG: 325 TABLET, FILM COATED ORAL at 16:36

## 2022-01-20 RX ADMIN — ACYCLOVIR 800 MG: 800 TABLET ORAL at 08:30

## 2022-01-20 RX ADMIN — FLUCONAZOLE 100 MG: 100 TABLET ORAL at 08:30

## 2022-01-20 RX ADMIN — TOLTERODINE 4 MG: 4 CAPSULE, EXTENDED RELEASE ORAL at 08:30

## 2022-01-20 RX ADMIN — Medication 4 ML: at 03:44

## 2022-01-20 RX ADMIN — OMEPRAZOLE 20 MG: 20 CAPSULE, DELAYED RELEASE ORAL at 16:36

## 2022-01-20 RX ADMIN — PROCHLORPERAZINE MALEATE 5 MG: 5 TABLET ORAL at 13:39

## 2022-01-20 RX ADMIN — PROCHLORPERAZINE MALEATE 5 MG: 5 TABLET ORAL at 19:03

## 2022-01-20 ASSESSMENT — ACTIVITIES OF DAILY LIVING (ADL)
ADLS_ACUITY_SCORE: 4

## 2022-01-20 ASSESSMENT — MIFFLIN-ST. JEOR: SCORE: 1796.59

## 2022-01-20 NOTE — PROGRESS NOTES
"BMT Daily CARTOX Note (complete days 0-14 and with any subsequent CRS/neurotoxicity)    Date: January 20, 2022    Juvenal Blake ([id number]) is a 56 year old year old male who received infusion on 1/17/22, currently day 3 of CAR-T cell therapy Yescarta    Vital Signs: /81 (BP Location: Right arm)   Pulse 76   Temp 98  F (36.7  C) (Oral)   Resp 18   Ht 1.715 m (5' 7.52\")   Wt 101 kg (222 lb 9.6 oz)   SpO2 100%   BMI 34.33 kg/m      1) CRS Grading (based ONLY on parameters in box below)    Fever:   </= 100.4    Blood pressure:   SBP >/= 90 (not hypotensive)    Oxygen saturation:    >/= 90% on room air (not hypoxic)    CRS Parameter Grade 1  Grade 2 Grade 3 Grade 4   Fever* Tm >= 100.4 degrees F Tm >= 100.4 degrees F Tm >= 100.4 degrees F Tm >= to 100.4  degrees F      With Either:  Hypotension (SBP <90) None Responsive to Fluids  Requiring 1  vasopressor (w/ or w/o vasopressin) Requiring multiple  vasopressors  (excluding  vasopressin)     And/or  Hypoxia (O2 sats <90% on room air) None Low-flow nasal  cannula or blow-by High-flow nasal  cannula, face mask,  non-rebreather mask,or Venturi mask  Requiring positive  pressure (CPAP,  BiPAP intubation and  mechanical  ventilation)     CRS Summary  Does patient have CRS? No  Current CRS stgstrstastdstest:st st1st What therapy was given: na  Has CRS grade changed? na  Has CRS resolved? na      2) Neurotoxicity:    ICE Assessment  Orientation to year, month, city, hospital: 4 points, Name 3 objects (e.g., point to clock, pen, button): 3 point, Following commands: (e.g., Show me 2 fingers): 1 point, Write a standard sentence (e.g., The tang jumped over the log): 1 point and Count backwards from 100 by ten: 1 point  Total points: 10: No impairment    ASTCT ICANS Consensus Grading for Adults  ICE Score   10    Seizure   No seizures    Motor Findings   No motor findings    Elevated ICP/Cerebral Edema   None      Neurotoxicity  Domain Grade 1 Grade 2 Grade 3 Grade 4   ICE score 7-9 " 3-6 1-2 0   Depressed LOC      Awakens  spontaneously Awakens to  voice  Awakens only to  tactile stimulation Unarousable or  requires vigorous  or repetitive  tactile stimuli to  arouse. Stupor  or coma.   Seizure n/a n/a Any clinical  seizure focal or  generalized that  resolves rapidly  or nonconvulsive  seizures on EEG  that resolve with  intervention  Life-threatening  prolonged  seizure (>5 min);  or Repetitive  clinical or  electrical  seizures without  return to baseline  in between    Motor Findings      n/a n/a n/a Deep focal motor  weakness such  as hemiparesis  or paraparesis   Elevated  ICP/cerebral  edema  n/a n/a Focal/local  edema on  neuroimaging  Diffuse cerebral  edema on  neuroimaging;  decerebrate or  decorticate  posturing; or  cranial nerve VI  palsy; or  papilledema; or  Cushing's triad     ICANS grade is determined by the most severe event (ICE score, level of consciousness, seizure, motor findings, raised ICP/cerebral edema) not attributable to any other cause; for example, a patient with an ICE score of 3 who has a generalized seizure is classified as grade 3 ICANS.    A patient with an ICE score of 0 may be classified as grade 3 ICANS if awake with global aphasia, but a patient with an ICE score of 0 may be classified as grade 4 ICANS if unarousable.     Depressed level of consciousness should be attributable to no other cause (eg, no sedating medication)    Tremors and myoclonus associated with immune effector cell therapies may be graded according to CTCAE v5.0,but they do not influence ICANS grading.     Intracranial hemorrhage with or without associated edema is not considered a neurotoxicity feature and is  excluded from ICANS grading. It may be graded according to CTCAE v5.0.        Neurotoxicity Summary  Does patient have neurotoxicity? No  Current Neurotoxicity score (0-10): 0  What therapy was given: na  Has neurotoxicity grade changed? na  Has neurotoxicity resolved? na      3)  Organ CAR-T toxicity:    Cardiac   none    Respiratory   none    Gastrointestinal   none    Hepatic    none    Skin   none     Coagulopathy    none     Other: no      4) HLH   no     * CTCAE grading can be found at   https://ctep.cancer.gov/protocoldevelopment/electronic_applications/docs/CTCAE_v5_Quick Reference_8.5x11.pdf    Irma Sneed PA-C  [phone]

## 2022-01-20 NOTE — PLAN OF CARE
Patient vital signs stable, neuro exam intact, no CRS activity noted. Patient had some intermittent nausea and requested prn compazine, He had a mild head ache this evening Tylenol given but this  did not help much, oxycodone 5 mg worked. Continue to monitor.  Problem: Adult Inpatient Plan of Care  Goal: Plan of Care Review  1/20/2022 1747 by Tono Schroeder, RN  Outcome: No Change  1/20/2022 1744 by Tono Schroeder, RN  Outcome: No Change  1/20/2022 1743 by Tono Schroeder, RN  Outcome: No Change

## 2022-01-20 NOTE — PLAN OF CARE
VSS. Afebrile. Up ad ludin. Denies pain. No PRNs given. Voiding well. Watching TV in room. Completed neuro assessment sentence. Wife present at bedside. Needs red lumen cap changed. PICC line dressing changed. Continue to monitor.  Problem: Adult Inpatient Plan of Care  Goal: Plan of Care Review  Outcome: No Change  Goal: Patient-Specific Goal (Individualized)  Outcome: No Change  Goal: Absence of Hospital-Acquired Illness or Injury  Outcome: No Change  Intervention: Identify and Manage Fall Risk  Recent Flowsheet Documentation  Taken 1/19/2022 0800 by Penny Mares RN  Safety Promotion/Fall Prevention:   assistive device/personal items within reach   patient and family education   room near nurse's station   room organization consistent  Intervention: Prevent Skin Injury  Recent Flowsheet Documentation  Taken 1/19/2022 0800 by Penny Mares RN  Body Position: position changed independently  Intervention: Prevent and Manage VTE (Venous Thromboembolism) Risk  Recent Flowsheet Documentation  Taken 1/19/2022 0800 by Penny Mares RN  VTE Prevention/Management:   ambulation promoted   bleeding risk assessed  Goal: Optimal Comfort and Wellbeing  Outcome: No Change  Goal: Readiness for Transition of Care  Outcome: No Change

## 2022-01-20 NOTE — PLAN OF CARE
Afebrile. Using home cpap while sleeping. OVSS. A&O x4. Denies pain. Mild nausea overnight, compazine given x1. No replacements needed. Voiding. Independent. Continue plan of care.         Problem: Adult Inpatient Plan of Care  Goal: Plan of Care Review  Outcome: No Change  Flowsheets (Taken 1/20/2022 9443)  Plan of Care Reviewed With: patient  Progress: no change     Problem: Adult Inpatient Plan of Care  Goal: Patient-Specific Goal (Individualized)  Outcome: No Change     Problem: Adult Inpatient Plan of Care  Goal: Absence of Hospital-Acquired Illness or Injury  Outcome: No Change     Problem: Adult Inpatient Plan of Care  Goal: Optimal Comfort and Wellbeing  Outcome: No Change     Problem: Adult Inpatient Plan of Care  Goal: Readiness for Transition of Care  Outcome: No Change     Problem: Adjustment to Transplant (Stem Cell/Bone Marrow Transplant)  Goal: Optimal Coping with Transplant  Outcome: No Change     Problem: Bladder Irritation (Stem Cell/Bone Marrow Transplant)  Goal: Symptom-Free Urinary Elimination  Outcome: No Change     Problem: Diarrhea (Stem Cell/Bone Marrow Transplant)  Goal: Diarrhea Symptom Control  Outcome: No Change     Problem: Fatigue (Stem Cell/Bone Marrow Transplant)  Goal: Energy Level Supports Daily Activity  Outcome: No Change     Problem: Hematologic Alteration (Stem Cell/Bone Marrow Transplant)  Goal: Blood Counts Within Acceptable Range  Outcome: No Change     Problem: Hypersensitivity Reaction (Stem Cell/Bone Marrow Transplant)  Goal: Absence of Hypersensitivity Reaction  Outcome: No Change     Problem: Infection Risk (Stem Cell/Bone Marrow Transplant)  Goal: Absence of Infection  Outcome: No Change     Problem: Mucositis (Stem Cell/Bone Marrow Transplant)  Goal: Mucous Membrane Health and Integrity  Outcome: No Change     Problem: Nausea and Vomiting (Stem Cell/Bone Marrow Transplant)  Goal: Nausea and Vomiting Symptom Relief  Outcome: No Change     Problem: Nutrition Intake  Altered (Stem Cell/Bone Marrow Transplant)  Goal: Optimal Nutrition Intake  Outcome: No Change

## 2022-01-20 NOTE — PROGRESS NOTES
"BMT/Cell Therapy Daily Progress Note   01/20/2022    Patient ID:  Juvenal Blake is a 56 year old male, currently day +3 s/p [facility]a CAR-T.     Diagnosis NHLFL Non-Hodgkin lymphoma, Follicular, predominantly large cell  BMTCT Type Cellular Therapy    Prep Regimen Fludarabine, Cytoxan  Donor Source Autologous     GVHD Prophylaxis NA  Primary BMT MD Dr. Irwin    Clinical Trials   2017-45         Admission date: 1/17/2022    INTERVAL  HISTORY     No changes overnight. No fevers and vitals stable. No new neuro symptoms. Some nausea, managing with compazine. No emesis. Stools ok. Abdominal pain stable.     Review of Systems: 10 point ROS negative except as noted above.      PHYSICAL EXAM     KPS:  90    /81 (BP Location: Right arm)   Pulse 76   Temp 98  F (36.7  C) (Oral)   Resp 18   Ht 1.715 m (5' 7.52\")   Wt 101 kg (222 lb 9.6 oz)   SpO2 100%   BMI 34.33 kg/m        General: NAD   Eyes: sclera anicteric   Nose/Mouth/Throat: OP moist, no ulcerations   Lungs: CTA bilaterally  Cardiovascular: RRR, no M/R/G   Abdominal/Rectal: +BS, soft, NT, obese, No HSM   Lymphatics: trace LE edema  Skin: No rash  Neuro: non-focal  Access: L arm PICC dressing cdi. R chest PAC.     LABS AND IMAGING: I have assessed all abnormal lab values for their clinical significance and any values considered clinically significant have been addressed in the assessment and plan.       Lab Results   Component Value Date    WBC 1.5 (L) 01/20/2022    ANEU 0.6 (L) 09/08/2021    HGB 9.6 (L) 01/20/2022    HCT 27.7 (L) 01/20/2022    PLT 64 (L) 01/20/2022     01/20/2022    POTASSIUM 4.2 01/20/2022    CHLORIDE 105 01/20/2022    CO2 28 01/20/2022     (H) 01/20/2022    BUN 21 01/20/2022    CR 1.09 01/20/2022    MAG 2.3 01/20/2022    INR 1.03 01/17/2022    BILITOTAL 0.6 01/17/2022    AST 23 01/17/2022    ALT 44 01/17/2022    ALKPHOS 90 01/17/2022    PROTTOTAL 6.6 (L) 01/17/2022    ALBUMIN 3.5 01/17/2022       SYSTEMS-BASED ASSESSMENT " AND PLAN      Juvenal Blake is a 56 year old male PMH significant for refractory NHL, Day +3 of Yes[facility]a CAR-T therapy     1. Follicular Lymphoma/[facility]a CAR-T:   Relapsed after BR, [facility], [facility]AM NK (9/1/20), idelalisib (~4-6/2020),  (6/28/21), polatuzumab (12/21/21). (hx Rituxan reaction).   - LD chemo with fludarabine/Cytoxan 1/12-1/14/22 prior to CAR-T infusion.    - cont allopurinol  - Day 0 Yescarta CAR-T 1/17/22     2. HEME: Keep hgb >7g, plt>10.  #pancytopenia 2/2 chemo/lymphoma.      3. ID: Afebrile.  #hx parainfluenza 3 (12/21/21): Occasional cough, otherwise sx improved.  #hx rhinovirus (10/6/21).     #Prophy: acyclovir, fluconazole, Levaquin-started on admission. Inhaled Pentamidine 1/13/21.       #Hx C diff colitis. Screening C.dif + on admission. Asymptomatic. If develops diarrhea, will Rx Vanco.     #S/p 3 covid shots; first two were pre- and booster was after.  Defer to car -t team regarding if revaccination needed post CART.      4. GI:    #JACQUE: schedule Compazine bid (8a, 6p per PTA routine). Additional Compazine, Ativan available prn.   - cont PTA omeprazole  -  hx medical cannibis.  #Abdominal Pain secondary to cancer. No complaints currently.      5. Renal/FEN: Creat, lytes wnl.  - replete prn per sliding scale  #severe malnutrition 2/2 chronic illness/cancer (based on wt loss). Cont high arnaldo/protein diet. No current intake concerns.     6. CV:    Cardiology cleared him to proceed with no further testing. The CT angiogram showed no lesion requiring stenting or bypass.     7. :   #hx Prostatic adenocarcinoma: s/p radical prostatectomy and bilateral lymph node dissection 11/2017. Completed radiation to the prostate fossa and pelvic lymph nodes at [facility] early May 2018. He received 4500 cGy in 25 fractions. He then had 2340 cGy boost in 13 fractions to the prostatic fossa, for a total dose of 6240 cGy in 38 treatment fractions delivered over 54 days. He did not receive  hormonal therapy:    9/10 PSA: 0.71 - no concerns.     Dispo: remain admitted through engraftment.    I spent 40 minutes in the care of this patient today, which included time necessary for preparation for the visit, obtaining history, ordering medications/tests/procedures as medically indicated, review of pertinent medical literature, counseling of the patient, communication of recommendations to the care team, and documentation time.    Irma Sneed PA-C  [phone]

## 2022-01-21 LAB
ALBUMIN SERPL-MCNC: 3.7 G/DL (ref 3.4–5)
ALP SERPL-CCNC: 100 U/L (ref 40–150)
ALT SERPL W P-5'-P-CCNC: 52 U/L (ref 0–70)
ANION GAP SERPL CALCULATED.3IONS-SCNC: 6 MMOL/L (ref 3–14)
APTT PPP: 34 SECONDS (ref 22–38)
AST SERPL W P-5'-P-CCNC: 21 U/L (ref 0–45)
BASOPHILS # BLD AUTO: 0 10E3/UL (ref 0–0.2)
BASOPHILS NFR BLD AUTO: 1 %
BILIRUB SERPL-MCNC: 0.6 MG/DL (ref 0.2–1.3)
BUN SERPL-MCNC: 20 MG/DL (ref 7–30)
CALCIUM SERPL-MCNC: 9.3 MG/DL (ref 8.5–10.1)
CHLORIDE BLD-SCNC: 106 MMOL/L (ref 94–109)
CO2 SERPL-SCNC: 26 MMOL/L (ref 20–32)
CREAT SERPL-MCNC: 1.08 MG/DL (ref 0.66–1.25)
CRP SERPL-MCNC: 3.5 MG/L (ref 0–8)
D DIMER PPP FEU-MCNC: 0.41 UG/ML FEU (ref 0–0.5)
EOSINOPHIL # BLD AUTO: 0.1 10E3/UL (ref 0–0.7)
EOSINOPHIL NFR BLD AUTO: 4 %
ERYTHROCYTE [DISTWIDTH] IN BLOOD BY AUTOMATED COUNT: 13 % (ref 10–15)
FERRITIN SERPL-MCNC: 176 NG/ML (ref 26–388)
FIBRINOGEN PPP-MCNC: 384 MG/DL (ref 170–490)
GFR SERPL CREATININE-BSD FRML MDRD: 80 ML/MIN/1.73M2
GLUCOSE BLD-MCNC: 110 MG/DL (ref 70–99)
HCT VFR BLD AUTO: 27.3 % (ref 40–53)
HGB BLD-MCNC: 9.5 G/DL (ref 13.3–17.7)
IMM GRANULOCYTES # BLD: 0 10E3/UL
IMM GRANULOCYTES NFR BLD: 1 %
INR PPP: 1.04 (ref 0.85–1.15)
LDH SERPL L TO P-CCNC: 190 U/L (ref 85–227)
LYMPHOCYTES # BLD AUTO: 0.2 10E3/UL (ref 0.8–5.3)
LYMPHOCYTES NFR BLD AUTO: 13 %
MAGNESIUM SERPL-MCNC: 2.3 MG/DL (ref 1.6–2.3)
MCH RBC QN AUTO: 32.4 PG (ref 26.5–33)
MCHC RBC AUTO-ENTMCNC: 34.8 G/DL (ref 31.5–36.5)
MCV RBC AUTO: 93 FL (ref 78–100)
MONOCYTES # BLD AUTO: 0.1 10E3/UL (ref 0–1.3)
MONOCYTES NFR BLD AUTO: 7 %
NEUTROPHILS # BLD AUTO: 0.9 10E3/UL (ref 1.6–8.3)
NEUTROPHILS NFR BLD AUTO: 74 %
NRBC # BLD AUTO: 0 10E3/UL
NRBC BLD AUTO-RTO: 0 /100
PHOSPHATE SERPL-MCNC: 3.8 MG/DL (ref 2.5–4.5)
PLATELET # BLD AUTO: 73 10E3/UL (ref 150–450)
POTASSIUM BLD-SCNC: 4 MMOL/L (ref 3.4–5.3)
PROT SERPL-MCNC: 6.4 G/DL (ref 6.8–8.8)
RBC # BLD AUTO: 2.93 10E6/UL (ref 4.4–5.9)
SODIUM SERPL-SCNC: 138 MMOL/L (ref 133–144)
URATE SERPL-MCNC: 3 MG/DL (ref 3.5–7.2)
WBC # BLD AUTO: 1.2 10E3/UL (ref 4–11)

## 2022-01-21 PROCEDURE — 85379 FIBRIN DEGRADATION QUANT: CPT | Performed by: PHYSICIAN ASSISTANT

## 2022-01-21 PROCEDURE — 85025 COMPLETE CBC W/AUTO DIFF WBC: CPT | Performed by: PHYSICIAN ASSISTANT

## 2022-01-21 PROCEDURE — 86140 C-REACTIVE PROTEIN: CPT | Performed by: PHYSICIAN ASSISTANT

## 2022-01-21 PROCEDURE — 250N000013 HC RX MED GY IP 250 OP 250 PS 637: Performed by: PHYSICIAN ASSISTANT

## 2022-01-21 PROCEDURE — 80053 COMPREHEN METABOLIC PANEL: CPT | Performed by: PHYSICIAN ASSISTANT

## 2022-01-21 PROCEDURE — U0003 INFECTIOUS AGENT DETECTION BY NUCLEIC ACID (DNA OR RNA); SEVERE ACUTE RESPIRATORY SYNDROME CORONAVIRUS 2 (SARS-COV-2) (CORONAVIRUS DISEASE [COVID-19]), AMPLIFIED PROBE TECHNIQUE, MAKING USE OF HIGH THROUGHPUT TECHNOLOGIES AS DESCRIBED BY CMS-2020-01-R: HCPCS | Performed by: PHYSICIAN ASSISTANT

## 2022-01-21 PROCEDURE — 84550 ASSAY OF BLOOD/URIC ACID: CPT | Performed by: PHYSICIAN ASSISTANT

## 2022-01-21 PROCEDURE — 85730 THROMBOPLASTIN TIME PARTIAL: CPT | Performed by: PHYSICIAN ASSISTANT

## 2022-01-21 PROCEDURE — 82728 ASSAY OF FERRITIN: CPT | Performed by: PHYSICIAN ASSISTANT

## 2022-01-21 PROCEDURE — 85610 PROTHROMBIN TIME: CPT | Performed by: PHYSICIAN ASSISTANT

## 2022-01-21 PROCEDURE — 84100 ASSAY OF PHOSPHORUS: CPT | Performed by: INTERNAL MEDICINE

## 2022-01-21 PROCEDURE — 85384 FIBRINOGEN ACTIVITY: CPT | Performed by: PHYSICIAN ASSISTANT

## 2022-01-21 PROCEDURE — 83615 LACTATE (LD) (LDH) ENZYME: CPT | Performed by: PHYSICIAN ASSISTANT

## 2022-01-21 PROCEDURE — 250N000011 HC RX IP 250 OP 636: Performed by: INTERNAL MEDICINE

## 2022-01-21 PROCEDURE — 83735 ASSAY OF MAGNESIUM: CPT | Performed by: PHYSICIAN ASSISTANT

## 2022-01-21 PROCEDURE — 206N000001 HC R&B BMT UMMC

## 2022-01-21 RX ORDER — AMOXICILLIN 250 MG
1 CAPSULE ORAL 2 TIMES DAILY PRN
Status: DISCONTINUED | OUTPATIENT
Start: 2022-01-21 | End: 2022-01-24 | Stop reason: HOSPADM

## 2022-01-21 RX ADMIN — PROCHLORPERAZINE MALEATE 5 MG: 5 TABLET ORAL at 00:41

## 2022-01-21 RX ADMIN — ACETAMINOPHEN 650 MG: 325 TABLET, FILM COATED ORAL at 18:18

## 2022-01-21 RX ADMIN — OMEPRAZOLE 20 MG: 20 CAPSULE, DELAYED RELEASE ORAL at 15:40

## 2022-01-21 RX ADMIN — ACYCLOVIR 800 MG: 800 TABLET ORAL at 19:41

## 2022-01-21 RX ADMIN — ALLOPURINOL 300 MG: 300 TABLET ORAL at 09:20

## 2022-01-21 RX ADMIN — PSYLLIUM HUSK 1 PACKET: 3.4 POWDER ORAL at 19:41

## 2022-01-21 RX ADMIN — PROCHLORPERAZINE MALEATE 5 MG: 5 TABLET ORAL at 09:19

## 2022-01-21 RX ADMIN — OXYCODONE HYDROCHLORIDE 5 MG: 5 TABLET ORAL at 15:58

## 2022-01-21 RX ADMIN — FLUCONAZOLE 100 MG: 100 TABLET ORAL at 09:20

## 2022-01-21 RX ADMIN — LEVOFLOXACIN 250 MG: 250 TABLET, FILM COATED ORAL at 09:20

## 2022-01-21 RX ADMIN — ACETAMINOPHEN 650 MG: 325 TABLET, FILM COATED ORAL at 09:29

## 2022-01-21 RX ADMIN — PROCHLORPERAZINE MALEATE 5 MG: 5 TABLET ORAL at 15:40

## 2022-01-21 RX ADMIN — Medication 4 ML: at 03:52

## 2022-01-21 RX ADMIN — TOLTERODINE 4 MG: 4 CAPSULE, EXTENDED RELEASE ORAL at 09:20

## 2022-01-21 RX ADMIN — OMEPRAZOLE 20 MG: 20 CAPSULE, DELAYED RELEASE ORAL at 09:20

## 2022-01-21 RX ADMIN — PROCHLORPERAZINE MALEATE 5 MG: 5 TABLET ORAL at 11:50

## 2022-01-21 RX ADMIN — ACYCLOVIR 800 MG: 800 TABLET ORAL at 09:20

## 2022-01-21 ASSESSMENT — ACTIVITIES OF DAILY LIVING (ADL)
ADLS_ACUITY_SCORE: 4

## 2022-01-21 ASSESSMENT — MIFFLIN-ST. JEOR: SCORE: 1793.41

## 2022-01-21 NOTE — PROGRESS NOTES
"BMT/Cell Therapy Daily Progress Note   01/21/2022    Patient ID:  Juvenal Blake is a 56 year old male, currently day +4 s/p [facility] CAR-T.     Diagnosis NHLFL Non-Hodgkin lymphoma, Follicular, predominantly large cell  BMTCT Type Cellular Therapy    Prep Regimen Fludarabine, Cytoxan  Donor Source Autologous     GVHD Prophylaxis NA  Primary BMT MD Dr. Irwin    Clinical Trials   2017-45         Admission date: 1/17/2022    INTERVAL  HISTORY     Remains afebrile. Eating/drinking ok. Mild nausea without emesis, managed with prn Compazine. No bm in a couple days. Requests metamucil. He has had mild intermittent headache without vision changes. He also reports some facial \"tightening\" or \"flushing\" (like hot air is blowing on his face) the past few days as well; just reported today. Face is not numb to touch. No fevers.     Review of Systems: 10 point ROS negative except as noted above.      PHYSICAL EXAM     KPS:  80    /84 (BP Location: Right arm)   Pulse 80   Temp 97.8  F (36.6  C) (Axillary)   Resp 16   Ht 1.715 m (5' 7.52\")   Wt 100.7 kg (221 lb 14.4 oz)   SpO2 100%   BMI 34.22 kg/m        General: NAD   Eyes: sclera anicteric   Nose/Mouth/Throat: OP moist, no ulcerations   Lungs: CTA bilaterally  Cardiovascular: RRR, no M/R/G   Abdominal/Rectal: +BS, soft, NT, obese, No HSM   Lymphatics: trace LE edema  Skin: No rash  Neuro: non-focal  Access: L arm PICC dressing cdi. R chest PAC.    ICE 10/10 (1/21)     LABS AND IMAGING: I have assessed all abnormal lab values for their clinical significance and any values considered clinically significant have been addressed in the assessment and plan.       Lab Results   Component Value Date    WBC 1.2 (L) 01/21/2022    ANEU 0.6 (L) 09/08/2021    HGB 9.5 (L) 01/21/2022    HCT 27.3 (L) 01/21/2022    PLT 73 (L) 01/21/2022     01/21/2022    POTASSIUM 4.0 01/21/2022    CHLORIDE 106 01/21/2022    CO2 26 01/21/2022     (H) 01/21/2022    BUN 20 01/21/2022 "    CR 1.08 01/21/2022    MAG 2.3 01/21/2022    INR 1.04 01/21/2022    BILITOTAL 0.6 01/21/2022    AST 21 01/21/2022    ALT 52 01/21/2022    ALKPHOS 100 01/21/2022    PROTTOTAL 6.4 (L) 01/21/2022    ALBUMIN 3.7 01/21/2022       SYSTEMS-BASED ASSESSMENT AND PLAN      Juvenal Blake is a 56 year old male PMH significant for refractory NHL, Day +4 of [facility] CAR-T therapy     1. Follicular Lymphoma/[facility] CAR-T:   Relapsed after BR, [facility], NAM NK (9/1/20), idelalisib (~4-6/2020),  (6/28/21), polatuzumab (12/21/21). (hx Rituxan reaction).   - LD chemo with fludarabine/Cytoxan 1/12-1/14/22 prior to CAR-T infusion.    - cont allopurinol  - Day 0 [facility] CAR-T 1/17/22  ICE 10/10 to date.  Daily CAR-Tox note through at least D14.     2. HEME: Keep hgb >7g, plt>10.  #pancytopenia 2/2 chemo/lymphoma.      3. ID: Afebrile.  #Prophy: acyclovir, fluconazole, Levaquin-started on admission. Inhaled Pentamidine 1/13/21.    #Hx C diff colitis. Screening C.dif + on admission. Asymptomatic. If develops diarrhea, will Rx Vanco.    #hx parainfluenza 3 (12/21/21): Occasional cough, otherwise sx improved.  #hx rhinovirus (10/6/21).      #S/p 3 covid shots; first two were pre- and booster was after.  Defer to car -t team regarding if revaccination needed post CART.      4. GI:    #JACQUE: schedule Compazine bid (8a, 6p per PTA routine). Additional Compazine, Ativan available prn.   - cont PTA omeprazole  -  hx medical cannibis.  #Abdominal Pain secondary to cancer. Minimal complaints currently.      5. Renal/FEN: Creat, lytes wnl.  - replete prn per sliding scale  #severe malnutrition 2/2 chronic illness/cancer (based on wt loss). Cont high arnaldo/protein diet. No current intake concerns.     6. CV:    Cardiology cleared him to proceed with no further testing. The CT angiogram showed no lesion requiring stenting or bypass.     7. :   #hx Prostatic adenocarcinoma: s/p radical prostatectomy and bilateral lymph node dissection  11/2017. Completed radiation to the prostate fossa and pelvic lymph nodes at [facility] early May 2018. He received 4500 cGy in 25 fractions. He then had 2340 cGy boost in 13 fractions to the prostatic fossa, for a total dose of 6240 cGy in 38 treatment fractions delivered over 54 days. He did not receive hormonal therapy:    9/10 PSA: 0.71 - no concerns.     I spent 45 minutes face-to-face and/or coordinating care. Over 50% of our time on the unit was spent counseling the patient and/or coordinating care regarding post transplant care, management and evaluation of symptoms.      Dispo: remain admitted through engraftment.    Jossie Cason PA-C  [phone]

## 2022-01-21 NOTE — PLAN OF CARE
Patient complained of mild headache and some facial numbness. Given tylenol for his headache and he had good relief. Reported the facial numbness to PA and charge nurse. Patient stated the facial numbness was not new today. Continue to monitor for CRS and any neuro toxicity.

## 2022-01-21 NOTE — PROGRESS NOTES
"BMT Daily CARTOX Note (complete days 0-14 and with any subsequent CRS/neurotoxicity)    Date: January 20, 2022    Juvenal Blake ([id number]) is a 56 year old year old male who received infusion on 1/17/22, currently day 4 of CAR-T cell therapy [facility]    Vital Signs: /84 (BP Location: Right arm)   Pulse 80   Temp 97.8  F (36.6  C) (Axillary)   Resp 16   Ht 1.715 m (5' 7.52\")   Wt 100.7 kg (221 lb 14.4 oz)   SpO2 100%   BMI 34.22 kg/m      1) CRS Grading (based ONLY on parameters in box below)    Fever:   </= 100.4    Blood pressure:   SBP >/= 90 (not hypotensive)    Oxygen saturation:    >/= 90% on room air (not hypoxic)    CRS Parameter Grade 1  Grade 2 Grade 3 Grade 4   Fever* Tm >= 100.4 degrees F Tm >= 100.4 degrees F Tm >= 100.4 degrees F Tm >= to 100.4  degrees F      With Either:  Hypotension (SBP <90) None Responsive to Fluids  Requiring 1  vasopressor (w/ or w/o vasopressin) Requiring multiple  vasopressors  (excluding  vasopressin)     And/or  Hypoxia (O2 sats <90% on room air) None Low-flow nasal  cannula or blow-by High-flow nasal  cannula, face mask,  non-rebreather mask,or Venturi mask  Requiring positive  pressure (CPAP,  BiPAP intubation and  mechanical  ventilation)     CRS Summary  Does patient have CRS? No  Current CRS stgstrstastdstest:st st1st What therapy was given: na  Has CRS grade changed? na  Has CRS resolved? na    2) Neurotoxicity:    ICE Assessment  Orientation to year, month, city, hospital: 4 points, Name 3 objects (e.g., point to clock, pen, button): 3 point, Following commands: (e.g., Show me 2 fingers): 1 point, Write a standard sentence (e.g., The tang jumped over the log): 1 point and Count backwards from 100 by ten: 1 point  Total points: 10: No impairment    ASTCT ICANS Consensus Grading for Adults  ICE Score   10    Seizure   No seizures    Motor Findings   No motor findings    Elevated ICP/Cerebral Edema   None      Neurotoxicity  Domain Grade 1 Grade 2 Grade 3 Grade 4   ICE " score 7-9 3-6 1-2 0   Depressed LOC      Awakens  spontaneously Awakens to  voice  Awakens only to  tactile stimulation Unarousable or  requires vigorous  or repetitive  tactile stimuli to  arouse. Stupor  or coma.   Seizure n/a n/a Any clinical  seizure focal or  generalized that  resolves rapidly  or nonconvulsive  seizures on EEG  that resolve with  intervention  Life-threatening  prolonged  seizure (>5 min);  or Repetitive  clinical or  electrical  seizures without  return to baseline  in between    Motor Findings      n/a n/a n/a Deep focal motor  weakness such  as hemiparesis  or paraparesis   Elevated  ICP/cerebral  edema  n/a n/a Focal/local  edema on  neuroimaging  Diffuse cerebral  edema on  neuroimaging;  decerebrate or  decorticate  posturing; or  cranial nerve VI  palsy; or  papilledema; or  Cushing's triad     ICANS grade is determined by the most severe event (ICE score, level of consciousness, seizure, motor findings, raised ICP/cerebral edema) not attributable to any other cause; for example, a patient with an ICE score of 3 who has a generalized seizure is classified as grade 3 ICANS.    A patient with an ICE score of 0 may be classified as grade 3 ICANS if awake with global aphasia, but a patient with an ICE score of 0 may be classified as grade 4 ICANS if unarousable.     Depressed level of consciousness should be attributable to no other cause (eg, no sedating medication)    Tremors and myoclonus associated with immune effector cell therapies may be graded according to CTCAE v5.0,but they do not influence ICANS grading.     Intracranial hemorrhage with or without associated edema is not considered a neurotoxicity feature and is  excluded from ICANS grading. It may be graded according to CTCAE v5.0.        Neurotoxicity Summary  Does patient have neurotoxicity? No  Current Neurotoxicity score (0-10): 0  What therapy was given: na  Has neurotoxicity grade changed? na  Has neurotoxicity resolved?  "na      3) Organ CAR-T toxicity:    Cardiac   none    Respiratory   none    Gastrointestinal   none    Hepatic    none    Skin   none     Coagulopathy    none     Other: occasional mild HA, subjective face \"tightening\"/\"flushing\" - monitor for now.      4) HLH   no     * CTCAE grading can be found at   https://ctep.cancer.gov/protocoldevelopment/electronic_applications/docs/CTCAE_v5_Quick Reference_8.5x11.pdf    Jossie Cason PA-C  [phone]            "

## 2022-01-21 NOTE — PLAN OF CARE
Afebrile. OVSS. Neuros intact. Denies pain. Intermittently nauseated, ativan x1 and compazine x1. No replacements needed this morning. Voiding well. Independent. Continue plan of care.         Problem: Adult Inpatient Plan of Care  Goal: Plan of Care Review  Outcome: No Change  Flowsheets (Taken 1/21/2022 0534)  Plan of Care Reviewed With: patient  Progress: no change     Problem: Adult Inpatient Plan of Care  Goal: Patient-Specific Goal (Individualized)  Outcome: No Change     Problem: Adult Inpatient Plan of Care  Goal: Absence of Hospital-Acquired Illness or Injury  Outcome: No Change     Problem: Adult Inpatient Plan of Care  Goal: Optimal Comfort and Wellbeing  Outcome: No Change     Problem: Adult Inpatient Plan of Care  Goal: Readiness for Transition of Care  Outcome: No Change     Problem: Adjustment to Transplant (Stem Cell/Bone Marrow Transplant)  Goal: Optimal Coping with Transplant  Outcome: No Change     Problem: Bladder Irritation (Stem Cell/Bone Marrow Transplant)  Goal: Symptom-Free Urinary Elimination  Outcome: No Change     Problem: Diarrhea (Stem Cell/Bone Marrow Transplant)  Goal: Diarrhea Symptom Control  Outcome: No Change     Problem: Fatigue (Stem Cell/Bone Marrow Transplant)  Goal: Energy Level Supports Daily Activity  Outcome: No Change     Problem: Hematologic Alteration (Stem Cell/Bone Marrow Transplant)  Goal: Blood Counts Within Acceptable Range  Outcome: No Change     Problem: Hypersensitivity Reaction (Stem Cell/Bone Marrow Transplant)  Goal: Absence of Hypersensitivity Reaction  Outcome: No Change     Problem: Infection Risk (Stem Cell/Bone Marrow Transplant)  Goal: Absence of Infection  Outcome: No Change     Problem: Mucositis (Stem Cell/Bone Marrow Transplant)  Goal: Mucous Membrane Health and Integrity  Outcome: No Change     Problem: Nausea and Vomiting (Stem Cell/Bone Marrow Transplant)  Goal: Nausea and Vomiting Symptom Relief  Outcome: No Change     Problem: Nutrition Intake  Altered (Stem Cell/Bone Marrow Transplant)  Goal: Optimal Nutrition Intake  Outcome: No Change

## 2022-01-22 LAB
ANION GAP SERPL CALCULATED.3IONS-SCNC: 5 MMOL/L (ref 3–14)
BASOPHILS # BLD MANUAL: 0 10E3/UL (ref 0–0.2)
BASOPHILS NFR BLD MANUAL: 1 %
BUN SERPL-MCNC: 18 MG/DL (ref 7–30)
CALCIUM SERPL-MCNC: 9.3 MG/DL (ref 8.5–10.1)
CHLORIDE BLD-SCNC: 105 MMOL/L (ref 94–109)
CO2 SERPL-SCNC: 28 MMOL/L (ref 20–32)
CREAT SERPL-MCNC: 1.17 MG/DL (ref 0.66–1.25)
EOSINOPHIL # BLD MANUAL: 0 10E3/UL (ref 0–0.7)
EOSINOPHIL NFR BLD MANUAL: 5 %
ERYTHROCYTE [DISTWIDTH] IN BLOOD BY AUTOMATED COUNT: 13.2 % (ref 10–15)
GFR SERPL CREATININE-BSD FRML MDRD: 72 ML/MIN/1.73M2
GLUCOSE BLD-MCNC: 116 MG/DL (ref 70–99)
HCT VFR BLD AUTO: 26.6 % (ref 40–53)
HGB BLD-MCNC: 9.2 G/DL (ref 13.3–17.7)
LYMPHOCYTES # BLD MANUAL: 0.2 10E3/UL (ref 0.8–5.3)
LYMPHOCYTES NFR BLD MANUAL: 27 %
MAGNESIUM SERPL-MCNC: 2.2 MG/DL (ref 1.6–2.3)
MCH RBC QN AUTO: 32.5 PG (ref 26.5–33)
MCHC RBC AUTO-ENTMCNC: 34.6 G/DL (ref 31.5–36.5)
MCV RBC AUTO: 94 FL (ref 78–100)
MONOCYTES # BLD MANUAL: 0.1 10E3/UL (ref 0–1.3)
MONOCYTES NFR BLD MANUAL: 8 %
NEUTROPHILS # BLD MANUAL: 0.5 10E3/UL (ref 1.6–8.3)
NEUTROPHILS NFR BLD MANUAL: 59 %
PHOSPHATE SERPL-MCNC: 3.5 MG/DL (ref 2.5–4.5)
PLAT MORPH BLD: ABNORMAL
PLATELET # BLD AUTO: 76 10E3/UL (ref 150–450)
POTASSIUM BLD-SCNC: 4.1 MMOL/L (ref 3.4–5.3)
RBC # BLD AUTO: 2.83 10E6/UL (ref 4.4–5.9)
RBC MORPH BLD: ABNORMAL
SARS-COV-2 RNA RESP QL NAA+PROBE: NEGATIVE
SODIUM SERPL-SCNC: 138 MMOL/L (ref 133–144)
WBC # BLD AUTO: 0.8 10E3/UL (ref 4–11)

## 2022-01-22 PROCEDURE — 250N000013 HC RX MED GY IP 250 OP 250 PS 637: Performed by: PHYSICIAN ASSISTANT

## 2022-01-22 PROCEDURE — 83735 ASSAY OF MAGNESIUM: CPT | Performed by: INTERNAL MEDICINE

## 2022-01-22 PROCEDURE — 206N000001 HC R&B BMT UMMC

## 2022-01-22 PROCEDURE — 85027 COMPLETE CBC AUTOMATED: CPT | Performed by: PHYSICIAN ASSISTANT

## 2022-01-22 PROCEDURE — 82310 ASSAY OF CALCIUM: CPT | Performed by: PHYSICIAN ASSISTANT

## 2022-01-22 PROCEDURE — 84100 ASSAY OF PHOSPHORUS: CPT | Performed by: INTERNAL MEDICINE

## 2022-01-22 PROCEDURE — 250N000011 HC RX IP 250 OP 636: Performed by: INTERNAL MEDICINE

## 2022-01-22 RX ORDER — PROCHLORPERAZINE MALEATE 5 MG
5 TABLET ORAL 4 TIMES DAILY
Status: DISCONTINUED | OUTPATIENT
Start: 2022-01-22 | End: 2022-01-24 | Stop reason: HOSPADM

## 2022-01-22 RX ADMIN — PROCHLORPERAZINE MALEATE 5 MG: 5 TABLET ORAL at 08:08

## 2022-01-22 RX ADMIN — LEVOFLOXACIN 250 MG: 250 TABLET, FILM COATED ORAL at 08:09

## 2022-01-22 RX ADMIN — ACYCLOVIR 800 MG: 800 TABLET ORAL at 20:26

## 2022-01-22 RX ADMIN — ALLOPURINOL 300 MG: 300 TABLET ORAL at 08:08

## 2022-01-22 RX ADMIN — OMEPRAZOLE 20 MG: 20 CAPSULE, DELAYED RELEASE ORAL at 17:12

## 2022-01-22 RX ADMIN — ACETAMINOPHEN 650 MG: 325 TABLET, FILM COATED ORAL at 14:16

## 2022-01-22 RX ADMIN — FLUCONAZOLE 100 MG: 100 TABLET ORAL at 08:08

## 2022-01-22 RX ADMIN — Medication 2 ML: at 03:37

## 2022-01-22 RX ADMIN — PROCHLORPERAZINE MALEATE 5 MG: 5 TABLET ORAL at 17:12

## 2022-01-22 RX ADMIN — OMEPRAZOLE 20 MG: 20 CAPSULE, DELAYED RELEASE ORAL at 08:09

## 2022-01-22 RX ADMIN — TOLTERODINE 4 MG: 4 CAPSULE, EXTENDED RELEASE ORAL at 08:09

## 2022-01-22 RX ADMIN — ACYCLOVIR 800 MG: 800 TABLET ORAL at 08:08

## 2022-01-22 RX ADMIN — PSYLLIUM HUSK 1 PACKET: 3.4 POWDER ORAL at 08:08

## 2022-01-22 RX ADMIN — PROCHLORPERAZINE MALEATE 5 MG: 5 TABLET ORAL at 20:26

## 2022-01-22 RX ADMIN — PROCHLORPERAZINE MALEATE 5 MG: 5 TABLET ORAL at 12:38

## 2022-01-22 ASSESSMENT — MIFFLIN-ST. JEOR: SCORE: 1793.41

## 2022-01-22 ASSESSMENT — ACTIVITIES OF DAILY LIVING (ADL)
ADLS_ACUITY_SCORE: 4

## 2022-01-22 NOTE — PROGRESS NOTES
s-pt with continued HA-mild requesting analgesia-deferred giving tylenol due to temp 99.8 ax. Later decreased to 97.8 HA continues and gave tylenol which seemed to help it resolve but pt says comes and goes. neuros intact with person place time situation. Wife at bedside and has no concerns. Ate light meal. Pt with dk circles under eyes. Declined sleep aide offered.  b-Car-t day 2  A-pt self care at this time  r-continue with on going intermitant assessments  miah r/t HA / low grade temp.

## 2022-01-22 NOTE — PLAN OF CARE
"/62 (BP Location: Right arm)   Pulse 78   Temp 98.8  F (37.1  C) (Oral)   Resp 17   Ht 1.715 m (5' 7.52\")   Wt 100.7 kg (221 lb 14.4 oz)   SpO2 97%   BMI 34.22 kg/m      AVSS on RA. Denied nausea/Vomitting. Headache comes and goes, denied intervention. Voiding well. No BM on this shift. WBC continues dropping, no replacements needed this morning.  A/O *4, independently in his room. Continue current plan of care.     Problem: Nausea and Vomiting (Stem Cell/Bone Marrow Transplant)  Goal: Nausea and Vomiting Symptom Relief  Outcome: Improving     Problem: Mucositis (Stem Cell/Bone Marrow Transplant)  Goal: Mucous Membrane Health and Integrity  Outcome: No Change     Problem: Infection Risk (Stem Cell/Bone Marrow Transplant)  Goal: Absence of Infection  Outcome: No Change     "

## 2022-01-22 NOTE — PROGRESS NOTES
"BMT/Cell Therapy Daily Progress Note   01/22/2022    Patient ID:  Juvenal Blake is a 56 year old male, currently day +5 s/p [facility] CAR-T.     Diagnosis NHLFL Non-Hodgkin lymphoma, Follicular, predominantly large cell  BMTCT Type Cellular Therapy    Prep Regimen Fludarabine, Cytoxan  Donor Source Autologous     GVHD Prophylaxis NA  Primary BMT MD Dr. Irwin    Clinical Trials   2017-45         Admission date: 1/17/2022    INTERVAL  HISTORY     Juvenal is doing well. No new complaints today. Ongoing nausea is stable. No emesis. Likes to take compazine regularly to try to stay on top of it. Stools normal. Abdominal pain is stable. Intermittent headaches, but none this morning.     Review of Systems: 10 point ROS negative except as noted above.      PHYSICAL EXAM     KPS:  80    /83 (BP Location: Right arm)   Pulse 80   Temp 98.3  F (36.8  C) (Oral)   Resp 16   Ht 1.715 m (5' 7.52\")   Wt 100.7 kg (221 lb 14.4 oz)   SpO2 98%   BMI 34.22 kg/m        General: NAD   Eyes: sclera anicteric   Nose/Mouth/Throat: OP moist, no ulcerations   Lungs: CTA bilaterally  Cardiovascular: RRR, no M/R/G   Abdominal/Rectal: +BS, soft, NT, obese, No HSM   Lymphatics: trace LE edema  Skin: No rash  Neuro: non-focal  Access: L arm PICC dressing cdi. R chest PAC.    ICE 10/10 (1/22)     LABS AND IMAGING: I have assessed all abnormal lab values for their clinical significance and any values considered clinically significant have been addressed in the assessment and plan.       Lab Results   Component Value Date    WBC 0.8 (LL) 01/22/2022    ANEU 0.5 (L) 01/22/2022    HGB 9.2 (L) 01/22/2022    HCT 26.6 (L) 01/22/2022    PLT 76 (L) 01/22/2022     01/22/2022    POTASSIUM 4.1 01/22/2022    CHLORIDE 105 01/22/2022    CO2 28 01/22/2022     (H) 01/22/2022    BUN 18 01/22/2022    CR 1.17 01/22/2022    MAG 2.2 01/22/2022    INR 1.04 01/21/2022    BILITOTAL 0.6 01/21/2022    AST 21 01/21/2022    ALT 52 01/21/2022    ALKPHOS " 100 01/21/2022    PROTTOTAL 6.4 (L) 01/21/2022    ALBUMIN 3.7 01/21/2022       SYSTEMS-BASED ASSESSMENT AND PLAN      Juvenal Blake is a 56 year old male PMH significant for refractory NHL, Day +5 of [facility] CAR-T therapy     1. Follicular Lymphoma/[facility] CAR-T:   Relapsed after BR, [facility], NAM NK (9/1/20), idelalisib (~4-6/2020),  (6/28/21), polatuzumab (12/21/21). (hx Rituxan reaction).   - LD chemo with fludarabine/Cytoxan 1/12-1/14/22 prior to CAR-T infusion.    - cont allopurinol  - Day 0 [facility] CAR-T 1/17/22  ICE 10/10 to date.  Daily CAR-Tox note through at least D14.     2. HEME: Keep hgb >7g, plt>10.  #pancytopenia 2/2 chemo/lymphoma.      3. ID: Afebrile.  #Prophy: acyclovir, fluconazole, Levaquin-started on admission. Inhaled Pentamidine 1/13/21.    #Hx C diff colitis. Screening C.dif + on admission. Asymptomatic. If develops diarrhea, will Rx Vanco.    #hx parainfluenza 3 (12/21/21): Occasional cough, otherwise sx improved.  #hx rhinovirus (10/6/21).      #S/p 3 covid shots; first two were pre- and booster was after.  Defer to car -t team regarding if revaccination needed post CART.      4. GI:    #JACQUE: schedule Compazine qid (8a, 1pm, 6p, 10pm). Ativan available prn.   - cont PTA omeprazole  -  hx medical cannibis.  #Abdominal Pain secondary to cancer. Minimal complaints currently.      5. Renal/FEN: Creat, lytes wnl.  - replete prn per sliding scale  #severe malnutrition 2/2 chronic illness/cancer (based on wt loss). Cont high arnaldo/protein diet. No current intake concerns.     6. CV:    Cardiology cleared him to proceed with no further testing. The CT angiogram showed no lesion requiring stenting or bypass.     7. :   #hx Prostatic adenocarcinoma: s/p radical prostatectomy and bilateral lymph node dissection 11/2017. Completed radiation to the prostate fossa and pelvic lymph nodes at [facility] early May 2018. He received 4500 cGy in 25 fractions. He then had 2340 cGy boost in  13 fractions to the prostatic fossa, for a total dose of 6240 cGy in 38 treatment fractions delivered over 54 days. He did not receive hormonal therapy:    9/10 PSA: 0.71 - no concerns.     I spent 45 minutes face-to-face and/or coordinating care. Over 50% of our time on the unit was spent counseling the patient and/or coordinating care regarding post transplant care, management and evaluation of symptoms.      Dispo: remain admitted through engraftment.    Irma Sneed PA-C  [phone]

## 2022-01-22 NOTE — PLAN OF CARE
Patient had no complaints in the AM, neuro's intact and no headache or facial numbness. This afternoon feeling chills and lacking an appetite. Requesting tylenol for a headache at that time, checked temp and it was normal.

## 2022-01-22 NOTE — PROGRESS NOTES
"BMT Daily CARTOX Note (complete days 0-14 and with any subsequent CRS/neurotoxicity)    Date: January 22, 2022    Juvenal Blake ([id number]) is a 56 year old year old male who received infusion on 1/17/22, currently day 5 of CAR-T cell therapy [facility]    Vital Signs: /83 (BP Location: Right arm)   Pulse 80   Temp 98.3  F (36.8  C) (Oral)   Resp 16   Ht 1.715 m (5' 7.52\")   Wt 100.7 kg (221 lb 14.4 oz)   SpO2 98%   BMI 34.22 kg/m      1) CRS Grading (based ONLY on parameters in box below)    Fever:   </= 100.4    Blood pressure:   SBP >/= 90 (not hypotensive)    Oxygen saturation:    >/= 90% on room air (not hypoxic)    CRS Parameter Grade 1  Grade 2 Grade 3 Grade 4   Fever* Tm >= 100.4 degrees F Tm >= 100.4 degrees F Tm >= 100.4 degrees F Tm >= to 100.4  degrees F      With Either:  Hypotension (SBP <90) None Responsive to Fluids  Requiring 1  vasopressor (w/ or w/o vasopressin) Requiring multiple  vasopressors  (excluding  vasopressin)     And/or  Hypoxia (O2 sats <90% on room air) None Low-flow nasal  cannula or blow-by High-flow nasal  cannula, face mask,  non-rebreather mask,or Venturi mask  Requiring positive  pressure (CPAP,  BiPAP intubation and  mechanical  ventilation)     CRS Summary  Does patient have CRS? No  Current CRS stgstrstastdstest:st st1st What therapy was given: na  Has CRS grade changed? na  Has CRS resolved? na    2) Neurotoxicity:    ICE Assessment  Orientation to year, month, city, hospital: 4 points, Name 3 objects (e.g., point to clock, pen, button): 3 point, Following commands: (e.g., Show me 2 fingers): 1 point, Write a standard sentence (e.g., The tang jumped over the log): 1 point and Count backwards from 100 by ten: 1 point  Total points: 10: No impairment    ASTCT ICANS Consensus Grading for Adults  ICE Score   10    Seizure   No seizures    Motor Findings   No motor findings    Elevated ICP/Cerebral Edema   None      Neurotoxicity  Domain Grade 1 Grade 2 Grade 3 Grade 4   ICE score " 7-9 3-6 1-2 0   Depressed LOC      Awakens  spontaneously Awakens to  voice  Awakens only to  tactile stimulation Unarousable or  requires vigorous  or repetitive  tactile stimuli to  arouse. Stupor  or coma.   Seizure n/a n/a Any clinical  seizure focal or  generalized that  resolves rapidly  or nonconvulsive  seizures on EEG  that resolve with  intervention  Life-threatening  prolonged  seizure (>5 min);  or Repetitive  clinical or  electrical  seizures without  return to baseline  in between    Motor Findings      n/a n/a n/a Deep focal motor  weakness such  as hemiparesis  or paraparesis   Elevated  ICP/cerebral  edema  n/a n/a Focal/local  edema on  neuroimaging  Diffuse cerebral  edema on  neuroimaging;  decerebrate or  decorticate  posturing; or  cranial nerve VI  palsy; or  papilledema; or  Cushing's triad     ICANS grade is determined by the most severe event (ICE score, level of consciousness, seizure, motor findings, raised ICP/cerebral edema) not attributable to any other cause; for example, a patient with an ICE score of 3 who has a generalized seizure is classified as grade 3 ICANS.    A patient with an ICE score of 0 may be classified as grade 3 ICANS if awake with global aphasia, but a patient with an ICE score of 0 may be classified as grade 4 ICANS if unarousable.     Depressed level of consciousness should be attributable to no other cause (eg, no sedating medication)    Tremors and myoclonus associated with immune effector cell therapies may be graded according to CTCAE v5.0,but they do not influence ICANS grading.     Intracranial hemorrhage with or without associated edema is not considered a neurotoxicity feature and is  excluded from ICANS grading. It may be graded according to CTCAE v5.0.        Neurotoxicity Summary  Does patient have neurotoxicity? No  Current Neurotoxicity score (0-10): 0  What therapy was given: na  Has neurotoxicity grade changed? na  Has neurotoxicity resolved?  "na      3) Organ CAR-T toxicity:    Cardiac   none    Respiratory   none    Gastrointestinal   none    Hepatic    none    Skin   none     Coagulopathy    none     Other: occasional mild HA, subjective face \"tightening\"/\"flushing\" - monitor for now.      4) HLH   no     * CTCAE grading can be found at   https://ctep.cancer.gov/protocoldevelopment/electronic_applications/docs/CTCAE_v5_Quick Reference_8.5x11.pdf    Irma Sneed PA-C  [phone]          "

## 2022-01-23 LAB
ANION GAP SERPL CALCULATED.3IONS-SCNC: 4 MMOL/L (ref 3–14)
BASOPHILS # BLD MANUAL: 0 10E3/UL (ref 0–0.2)
BASOPHILS NFR BLD MANUAL: 2 %
BUN SERPL-MCNC: 17 MG/DL (ref 7–30)
CALCIUM SERPL-MCNC: 9.3 MG/DL (ref 8.5–10.1)
CHLORIDE BLD-SCNC: 105 MMOL/L (ref 94–109)
CO2 SERPL-SCNC: 29 MMOL/L (ref 20–32)
CREAT SERPL-MCNC: 1.16 MG/DL (ref 0.66–1.25)
ELLIPTOCYTES BLD QL SMEAR: SLIGHT
EOSINOPHIL # BLD MANUAL: 0.1 10E3/UL (ref 0–0.7)
EOSINOPHIL NFR BLD MANUAL: 7 %
ERYTHROCYTE [DISTWIDTH] IN BLOOD BY AUTOMATED COUNT: 13.8 % (ref 10–15)
GFR SERPL CREATININE-BSD FRML MDRD: 73 ML/MIN/1.73M2
GLUCOSE BLD-MCNC: 113 MG/DL (ref 70–99)
HCT VFR BLD AUTO: 26.2 % (ref 40–53)
HGB BLD-MCNC: 9.1 G/DL (ref 13.3–17.7)
LYMPHOCYTES # BLD MANUAL: 0.3 10E3/UL (ref 0.8–5.3)
LYMPHOCYTES NFR BLD MANUAL: 33 %
MAGNESIUM SERPL-MCNC: 2.4 MG/DL (ref 1.6–2.3)
MCH RBC QN AUTO: 33.1 PG (ref 26.5–33)
MCHC RBC AUTO-ENTMCNC: 34.7 G/DL (ref 31.5–36.5)
MCV RBC AUTO: 95 FL (ref 78–100)
MONOCYTES # BLD MANUAL: 0.1 10E3/UL (ref 0–1.3)
MONOCYTES NFR BLD MANUAL: 15 %
NEUTROPHILS # BLD MANUAL: 0.4 10E3/UL (ref 1.6–8.3)
NEUTROPHILS NFR BLD MANUAL: 43 %
PHOSPHATE SERPL-MCNC: 4 MG/DL (ref 2.5–4.5)
PLAT MORPH BLD: ABNORMAL
PLATELET # BLD AUTO: 69 10E3/UL (ref 150–450)
POLYCHROMASIA BLD QL SMEAR: SLIGHT
POTASSIUM BLD-SCNC: 3.8 MMOL/L (ref 3.4–5.3)
RBC # BLD AUTO: 2.75 10E6/UL (ref 4.4–5.9)
RBC MORPH BLD: ABNORMAL
SODIUM SERPL-SCNC: 138 MMOL/L (ref 133–144)
WBC # BLD AUTO: 0.9 10E3/UL (ref 4–11)

## 2022-01-23 PROCEDURE — 83735 ASSAY OF MAGNESIUM: CPT | Performed by: INTERNAL MEDICINE

## 2022-01-23 PROCEDURE — 84100 ASSAY OF PHOSPHORUS: CPT | Performed by: INTERNAL MEDICINE

## 2022-01-23 PROCEDURE — 258N000003 HC RX IP 258 OP 636: Performed by: PHYSICIAN ASSISTANT

## 2022-01-23 PROCEDURE — 85027 COMPLETE CBC AUTOMATED: CPT | Performed by: PHYSICIAN ASSISTANT

## 2022-01-23 PROCEDURE — 80048 BASIC METABOLIC PNL TOTAL CA: CPT | Performed by: PHYSICIAN ASSISTANT

## 2022-01-23 PROCEDURE — 250N000013 HC RX MED GY IP 250 OP 250 PS 637: Performed by: PHYSICIAN ASSISTANT

## 2022-01-23 PROCEDURE — 206N000001 HC R&B BMT UMMC

## 2022-01-23 PROCEDURE — 250N000011 HC RX IP 250 OP 636: Performed by: INTERNAL MEDICINE

## 2022-01-23 RX ADMIN — TOLTERODINE 4 MG: 4 CAPSULE, EXTENDED RELEASE ORAL at 08:06

## 2022-01-23 RX ADMIN — OMEPRAZOLE 20 MG: 20 CAPSULE, DELAYED RELEASE ORAL at 15:49

## 2022-01-23 RX ADMIN — OMEPRAZOLE 20 MG: 20 CAPSULE, DELAYED RELEASE ORAL at 08:06

## 2022-01-23 RX ADMIN — PSYLLIUM HUSK 1 PACKET: 3.4 POWDER ORAL at 08:06

## 2022-01-23 RX ADMIN — ACETAMINOPHEN 650 MG: 325 TABLET, FILM COATED ORAL at 00:13

## 2022-01-23 RX ADMIN — ACYCLOVIR 800 MG: 800 TABLET ORAL at 20:06

## 2022-01-23 RX ADMIN — PROCHLORPERAZINE MALEATE 5 MG: 5 TABLET ORAL at 12:30

## 2022-01-23 RX ADMIN — PROCHLORPERAZINE MALEATE 5 MG: 5 TABLET ORAL at 20:06

## 2022-01-23 RX ADMIN — ALLOPURINOL 300 MG: 300 TABLET ORAL at 08:07

## 2022-01-23 RX ADMIN — FLUCONAZOLE 100 MG: 100 TABLET ORAL at 08:06

## 2022-01-23 RX ADMIN — ACETAMINOPHEN 650 MG: 325 TABLET, FILM COATED ORAL at 20:06

## 2022-01-23 RX ADMIN — PROCHLORPERAZINE MALEATE 5 MG: 5 TABLET ORAL at 15:48

## 2022-01-23 RX ADMIN — ACETAMINOPHEN 650 MG: 325 TABLET, FILM COATED ORAL at 15:55

## 2022-01-23 RX ADMIN — SODIUM CHLORIDE 1000 ML: 9 INJECTION, SOLUTION INTRAVENOUS at 09:38

## 2022-01-23 RX ADMIN — ACYCLOVIR 800 MG: 800 TABLET ORAL at 08:07

## 2022-01-23 RX ADMIN — PROCHLORPERAZINE MALEATE 5 MG: 5 TABLET ORAL at 00:13

## 2022-01-23 RX ADMIN — Medication 5 ML: at 11:35

## 2022-01-23 RX ADMIN — PROCHLORPERAZINE MALEATE 5 MG: 5 TABLET ORAL at 08:06

## 2022-01-23 RX ADMIN — LEVOFLOXACIN 250 MG: 250 TABLET, FILM COATED ORAL at 08:06

## 2022-01-23 ASSESSMENT — ACTIVITIES OF DAILY LIVING (ADL)
ADLS_ACUITY_SCORE: 4

## 2022-01-23 ASSESSMENT — MIFFLIN-ST. JEOR: SCORE: 1792.5

## 2022-01-23 NOTE — PLAN OF CARE
"[id number]  /74 (BP Location: Right arm)   Pulse 64   Temp 98.3  F (36.8  C) (Oral)   Resp 18   Ht 1.715 m (5' 7.52\")   Wt 100.7 kg (221 lb 14.4 oz)   SpO2 100%   BMI 34.22 kg/m    Tmax 99.7. OVSS on RA. Neuros intact. No replacements needed this morning. Tylenol given x1 for pt c/o headache. 5 mg of oral compazine given x1 for pt c/o intermittent nausea. No stool reported overnight. Voiding adequately. Up IND. Continue with plan of care.    Problem: Adult Inpatient Plan of Care  Goal: Plan of Care Review  Outcome: No Change  Flowsheets (Taken 1/23/2022 0452)  Plan of Care Reviewed With: patient  Progress: no change     Problem: Adjustment to Transplant (Stem Cell/Bone Marrow Transplant)  Goal: Optimal Coping with Transplant  Outcome: No Change     Problem: Bladder Irritation (Stem Cell/Bone Marrow Transplant)  Goal: Symptom-Free Urinary Elimination  Outcome: No Change     Problem: Bladder Irritation (Stem Cell/Bone Marrow Transplant)  Goal: Symptom-Free Urinary Elimination  Intervention: Monitor and Manage Bladder Irritation  Recent Flowsheet Documentation  Taken 1/23/2022 0000 by Maryellen Carvajal, RN  Pain Management Interventions: medication (see MAR)     Problem: Diarrhea (Stem Cell/Bone Marrow Transplant)  Goal: Diarrhea Symptom Control  Outcome: No Change     Problem: Fatigue (Stem Cell/Bone Marrow Transplant)  Goal: Energy Level Supports Daily Activity  Outcome: No Change     Problem: Hematologic Alteration (Stem Cell/Bone Marrow Transplant)  Goal: Blood Counts Within Acceptable Range  Outcome: No Change     Problem: Hypersensitivity Reaction (Stem Cell/Bone Marrow Transplant)  Goal: Absence of Hypersensitivity Reaction  Outcome: No Change     Problem: Infection Risk (Stem Cell/Bone Marrow Transplant)  Goal: Absence of Infection  Outcome: No Change     Problem: Infection Risk (Stem Cell/Bone Marrow Transplant)  Goal: Absence of Infection  Intervention: Prevent and Manage Infection  Recent " Flowsheet Documentation  Taken 1/23/2022 0100 by Maryellen Carvajal RN  Isolation Precautions: protective environment maintained     Problem: Mucositis (Stem Cell/Bone Marrow Transplant)  Goal: Mucous Membrane Health and Integrity  Outcome: No Change     Problem: Mucositis (Stem Cell/Bone Marrow Transplant)  Goal: Mucous Membrane Health and Integrity  Intervention: Maintain Oral Mucous Membrane Integrity  Recent Flowsheet Documentation  Taken 1/23/2022 0100 by Maryellen Carvajal RN  Oral Care: oral rinse provided     Problem: Nausea and Vomiting (Stem Cell/Bone Marrow Transplant)  Goal: Nausea and Vomiting Symptom Relief  Outcome: No Change     Problem: Nausea and Vomiting (Stem Cell/Bone Marrow Transplant)  Goal: Nausea and Vomiting Symptom Relief  Intervention: Prevent and Manage Nausea and Vomiting  Recent Flowsheet Documentation  Taken 1/23/2022 0100 by Maryellen Carvajal RN  Nausea/Vomiting Interventions: antiemetic     Problem: Nutrition Intake Altered (Stem Cell/Bone Marrow Transplant)  Goal: Optimal Nutrition Intake  Outcome: No Change     Problem: Adult Inpatient Plan of Care  Goal: Absence of Hospital-Acquired Illness or Injury  Intervention: Identify and Manage Fall Risk  Recent Flowsheet Documentation  Taken 1/23/2022 0100 by Maryellen Carvajal RN  Safety Promotion/Fall Prevention:    assistive device/personal items within reach    clutter free environment maintained    fall prevention program maintained    increased rounding and observation    increase visualization of patient    lighting adjusted    nonskid shoes/slippers when out of bed    patient and family education    room organization consistent    safety round/check completed     Problem: Adult Inpatient Plan of Care  Goal: Absence of Hospital-Acquired Illness or Injury  Intervention: Prevent Skin Injury  Recent Flowsheet Documentation  Taken 1/23/2022 0100 by Maryellen Carvajal RN  Body Position: position changed independently     Problem: Adult  Inpatient Plan of Care  Goal: Absence of Hospital-Acquired Illness or Injury  Intervention: Prevent and Manage VTE (Venous Thromboembolism) Risk  Recent Flowsheet Documentation  Taken 1/23/2022 0100 by Maryellen Carvajal, RN  VTE Prevention/Management:    ambulation promoted    bleeding precautions maintained    bleeding risk assessed    fluids promoted

## 2022-01-23 NOTE — PROGRESS NOTES
s-pt with 5lb wt loss since admission on the 17th/ 5 days ago. Pt. Has been negative in fluids daily. this rn informed pt on this this info and encouraged and instructed on taking in more fluids and high calorie / protein items- to order ensure with his meals. Pt sleeping most of this shift. Supported this need for extra sleep this ledy. No headache reported. Low grade temp.   b-day5 car-t therapy  A-pt with declining food and fluid support independantly.   r-encourage intake-offer snacks dietary consult.

## 2022-01-23 NOTE — PROGRESS NOTES
"BMT Daily CARTOX Note (complete days 0-14 and with any subsequent CRS/neurotoxicity)    Date: January 23, 2022    Juvenal Blake ([id number]) is a 56 year old year old male who received infusion on 1/17/22, currently day 6 of CAR-T cell therapy [facility]    Vital Signs: /73 (BP Location: Right arm)   Pulse 89   Temp 98.3  F (36.8  C) (Oral)   Resp 18   Ht 1.715 m (5' 7.52\")   Wt 100.6 kg (221 lb 11.2 oz)   SpO2 98%   BMI 34.19 kg/m      1) CRS Grading (based ONLY on parameters in box below)    Fever:   </= 100.4    Blood pressure:   SBP >/= 90 (not hypotensive)    Oxygen saturation:    >/= 90% on room air (not hypoxic)    CRS Parameter Grade 1  Grade 2 Grade 3 Grade 4   Fever* Tm >= 100.4 degrees F Tm >= 100.4 degrees F Tm >= 100.4 degrees F Tm >= to 100.4  degrees F      With Either:  Hypotension (SBP <90) None Responsive to Fluids  Requiring 1  vasopressor (w/ or w/o vasopressin) Requiring multiple  vasopressors  (excluding  vasopressin)     And/or  Hypoxia (O2 sats <90% on room air) None Low-flow nasal  cannula or blow-by High-flow nasal  cannula, face mask,  non-rebreather mask,or Venturi mask  Requiring positive  pressure (CPAP,  BiPAP intubation and  mechanical  ventilation)     CRS Summary  Does patient have CRS? No  Current CRS stgstrstastdstest:st st1st What therapy was given: na  Has CRS grade changed? na  Has CRS resolved? na    2) Neurotoxicity:    ICE Assessment  Orientation to year, month, city, hospital: 4 points, Name 3 objects (e.g., point to clock, pen, button): 3 point, Following commands: (e.g., Show me 2 fingers): 1 point, Write a standard sentence (e.g., The tang jumped over the log): 1 point and Count backwards from 100 by ten: 1 point  Total points: 10: No impairment    ASTCT ICANS Consensus Grading for Adults  ICE Score   10    Seizure   No seizures    Motor Findings   No motor findings    Elevated ICP/Cerebral Edema   None      Neurotoxicity  Domain Grade 1 Grade 2 Grade 3 Grade 4   ICE score " 7-9 3-6 1-2 0   Depressed LOC      Awakens  spontaneously Awakens to  voice  Awakens only to  tactile stimulation Unarousable or  requires vigorous  or repetitive  tactile stimuli to  arouse. Stupor  or coma.   Seizure n/a n/a Any clinical  seizure focal or  generalized that  resolves rapidly  or nonconvulsive  seizures on EEG  that resolve with  intervention  Life-threatening  prolonged  seizure (>5 min);  or Repetitive  clinical or  electrical  seizures without  return to baseline  in between    Motor Findings      n/a n/a n/a Deep focal motor  weakness such  as hemiparesis  or paraparesis   Elevated  ICP/cerebral  edema  n/a n/a Focal/local  edema on  neuroimaging  Diffuse cerebral  edema on  neuroimaging;  decerebrate or  decorticate  posturing; or  cranial nerve VI  palsy; or  papilledema; or  Cushing's triad     ICANS grade is determined by the most severe event (ICE score, level of consciousness, seizure, motor findings, raised ICP/cerebral edema) not attributable to any other cause; for example, a patient with an ICE score of 3 who has a generalized seizure is classified as grade 3 ICANS.    A patient with an ICE score of 0 may be classified as grade 3 ICANS if awake with global aphasia, but a patient with an ICE score of 0 may be classified as grade 4 ICANS if unarousable.     Depressed level of consciousness should be attributable to no other cause (eg, no sedating medication)    Tremors and myoclonus associated with immune effector cell therapies may be graded according to CTCAE v5.0,but they do not influence ICANS grading.     Intracranial hemorrhage with or without associated edema is not considered a neurotoxicity feature and is  excluded from ICANS grading. It may be graded according to CTCAE v5.0.        Neurotoxicity Summary  Does patient have neurotoxicity? No  Current Neurotoxicity score (0-10): 0  What therapy was given: na  Has neurotoxicity grade changed? na  Has neurotoxicity resolved?  "na      3) Organ CAR-T toxicity:    Cardiac   none    Respiratory   none    Gastrointestinal   none    Hepatic    none    Skin   none     Coagulopathy    none     Other: occasional mild HA, subjective face \"tightening\"/\"flushing\" - monitor for now.      4) HLH   no     * CTCAE grading can be found at   https://ctep.cancer.gov/protocoldevelopment/electronic_applications/docs/CTCAE_v5_Quick Reference_8.5x11.pdf    Irma Sneed PA-C  [phone]        "

## 2022-01-23 NOTE — PLAN OF CARE
Patient denies any headache this AM, does state he feels a little light headed with getting up. Given one liter NS over two hours. On schedule compazine for his nausea control and it seems to be working well. Continue to monitor for CRS or any neurotoxicity symptoms.

## 2022-01-23 NOTE — PROGRESS NOTES
"BMT/Cell Therapy Daily Progress Note   01/23/2022    Patient ID:  Juvenal Blake is a 56 year old male, currently day +6 s/p Yescarta CAR-T.     Diagnosis NHLFL Non-Hodgkin lymphoma, Follicular, predominantly large cell  BMTCT Type Cellular Therapy    Prep Regimen Fludarabine, Cytoxan  Donor Source Autologous     GVHD Prophylaxis NA  Primary BMT MD Dr. Irwin    Clinical Trials   2017-45         Admission date: 1/17/2022    INTERVAL  HISTORY     Juvenal is doing ok. Tmax in the 99s. Feels a bit dizzy with positional changes today. Nausea managed with scheduled compazine. No emesis. Intake is less, but working half of a Fontacto'Glokalise this morning. Stools formed. No new pain. No change in headache or facial flushing sensation.     Review of Systems: 10 point ROS negative except as noted above.      PHYSICAL EXAM     KPS:  80    /73 (BP Location: Right arm)   Pulse 89   Temp 98.3  F (36.8  C) (Oral)   Resp 18   Ht 1.715 m (5' 7.52\")   Wt 100.7 kg (221 lb 14.4 oz)   SpO2 98%   BMI 34.22 kg/m        General: NAD   Eyes: sclera anicteric   Nose/Mouth/Throat: OP moist, no ulcerations   Lungs: CTA bilaterally  Cardiovascular: RRR, no M/R/G   Abdominal/Rectal: +BS, soft, NT, obese, No HSM   Lymphatics: trace LE edema  Skin: No rash  Neuro: non-focal  Access: L arm PICC dressing cdi. R chest PAC.    ICE 10/10 (1/23)     LABS AND IMAGING: I have assessed all abnormal lab values for their clinical significance and any values considered clinically significant have been addressed in the assessment and plan.       Lab Results   Component Value Date    WBC 0.9 (LL) 01/23/2022    ANEU 0.4 (LL) 01/23/2022    HGB 9.1 (L) 01/23/2022    HCT 26.2 (L) 01/23/2022    PLT 69 (L) 01/23/2022     01/23/2022    POTASSIUM 3.8 01/23/2022    CHLORIDE 105 01/23/2022    CO2 29 01/23/2022     (H) 01/23/2022    BUN 17 01/23/2022    CR 1.16 01/23/2022    MAG 2.4 (H) 01/23/2022    INR 1.04 01/21/2022    BILITOTAL 0.6 " 01/21/2022    AST 21 01/21/2022    ALT 52 01/21/2022    ALKPHOS 100 01/21/2022    PROTTOTAL 6.4 (L) 01/21/2022    ALBUMIN 3.7 01/21/2022       SYSTEMS-BASED ASSESSMENT AND PLAN      Juvenal Blake is a 56 year old male PMH significant for refractory NHL, Day +6 of Yescarta CAR-T therapy     1. Follicular Lymphoma/Yescarta CAR-T:   Relapsed after BR, [facility], NAM NK (9/1/20), idelalisib (~4-6/2020),  (6/28/21), polatuzumab (12/21/21). (hx Rituxan reaction).   - LD chemo with fludarabine/Cytoxan 1/12-1/14/22 prior to CAR-T infusion.    - cont allopurinol  - Day 0 Yescarta CAR-T 1/17/22  ICE 10/10 to date.  Daily CAR-Tox note through at least D14.     2. HEME: Keep hgb >7g, plt>10.  #pancytopenia 2/2 chemo/lymphoma.      3. ID: Afebrile.  #Prophy: acyclovir, fluconazole, Levaquin-started on admission. Inhaled Pentamidine 1/13/21.    #Hx C diff colitis. Screening C.dif + on admission. Asymptomatic. If develops diarrhea, will Rx Vanco.    #hx parainfluenza 3 (12/21/21): Occasional cough, otherwise sx improved.  #hx rhinovirus (10/6/21).      #S/p 3 covid shots; first two were pre- and booster was after.  Defer to car -t team regarding if revaccination needed post CART.      4. GI:    #JACQUE: schedule Compazine qid (8a, 1pm, 6p, 10pm). Ativan available prn.   - cont PTA omeprazole  -  hx medical cannibis.  #Abdominal Pain secondary to cancer. Minimal complaints currently.      5. Renal/FEN: Creat, lytes wnl.  - replete prn per sliding scale  #severe malnutrition 2/2 chronic illness/cancer (based on wt loss). Cont high arnaldo/protein diet. No current intake concerns.     6. CV:    - BP stable but feels a bit dizzy today. I/Os net negative yesterday. Give 1L NS bolus  Cardiology cleared him to proceed with no further testing. The CT angiogram showed no lesion requiring stenting or bypass.     7. :   #hx Prostatic adenocarcinoma: s/p radical prostatectomy and bilateral lymph node dissection 11/2017. Completed radiation  to the prostate fossa and pelvic lymph nodes at [facility] early May 2018. He received 4500 cGy in 25 fractions. He then had 2340 cGy boost in 13 fractions to the prostatic fossa, for a total dose of 6240 cGy in 38 treatment fractions delivered over 54 days. He did not receive hormonal therapy:    9/10 PSA: 0.71 - no concerns.     I spent 45 minutes face-to-face and/or coordinating care. Over 50% of our time on the unit was spent counseling the patient and/or coordinating care regarding post transplant care, management and evaluation of symptoms.      Dispo: anticipate discharge tomorrow if vitals stable and not febrile overnight    Irma Sneed PA-C  [phone]

## 2022-01-23 NOTE — PROGRESS NOTES
Nutrition Services Brief Note - see full assessment by RD on (1/17)    RN consult: pt with 5 lb wt since sdmission on the 17th.  pls assess    Patient is being followed by RD.   Patient on: High calorie/high protein Diet and supplements prn with 100% intake per nursing documentation     RD will continue to follow per protocol.    Rossi Oconnell, MS/RD/GRECIA  5C RD Pager: [phone]  Weekend RD pager [phone]

## 2022-01-24 ENCOUNTER — CARE COORDINATION (OUTPATIENT)
Dept: ONCOLOGY | Facility: CLINIC | Age: 57
End: 2022-01-24
Payer: COMMERCIAL

## 2022-01-24 VITALS
HEIGHT: 68 IN | WEIGHT: 223.6 LBS | OXYGEN SATURATION: 96 % | SYSTOLIC BLOOD PRESSURE: 129 MMHG | HEART RATE: 91 BPM | TEMPERATURE: 98.5 F | DIASTOLIC BLOOD PRESSURE: 73 MMHG | RESPIRATION RATE: 16 BRPM | BODY MASS INDEX: 33.89 KG/M2

## 2022-01-24 LAB
ALBUMIN SERPL-MCNC: 3.7 G/DL (ref 3.4–5)
ALP SERPL-CCNC: 94 U/L (ref 40–150)
ALT SERPL W P-5'-P-CCNC: 41 U/L (ref 0–70)
ANION GAP SERPL CALCULATED.3IONS-SCNC: 4 MMOL/L (ref 3–14)
APTT PPP: 30 SECONDS (ref 22–38)
AST SERPL W P-5'-P-CCNC: 17 U/L (ref 0–45)
BASOPHILS # BLD AUTO: 0 10E3/UL (ref 0–0.2)
BASOPHILS NFR BLD AUTO: 0 %
BILIRUB DIRECT SERPL-MCNC: 0.2 MG/DL (ref 0–0.2)
BILIRUB SERPL-MCNC: 0.7 MG/DL (ref 0.2–1.3)
BUN SERPL-MCNC: 16 MG/DL (ref 7–30)
CALCIUM SERPL-MCNC: 8.9 MG/DL (ref 8.5–10.1)
CHLORIDE BLD-SCNC: 105 MMOL/L (ref 94–109)
CMV DNA SPEC NAA+PROBE-ACNC: NOT DETECTED IU/ML
CO2 SERPL-SCNC: 29 MMOL/L (ref 20–32)
CREAT SERPL-MCNC: 1.18 MG/DL (ref 0.66–1.25)
CRP SERPL-MCNC: 8.2 MG/L (ref 0–8)
D DIMER PPP FEU-MCNC: 0.34 UG/ML FEU (ref 0–0.5)
DACRYOCYTES BLD QL SMEAR: SLIGHT
EOSINOPHIL # BLD AUTO: 0 10E3/UL (ref 0–0.7)
EOSINOPHIL NFR BLD AUTO: 3 %
ERYTHROCYTE [DISTWIDTH] IN BLOOD BY AUTOMATED COUNT: 14.2 % (ref 10–15)
FERRITIN SERPL-MCNC: 168 NG/ML (ref 26–388)
FIBRINOGEN PPP-MCNC: 449 MG/DL (ref 170–490)
GFR SERPL CREATININE-BSD FRML MDRD: 72 ML/MIN/1.73M2
GLUCOSE BLD-MCNC: 114 MG/DL (ref 70–99)
HCT VFR BLD AUTO: 27.4 % (ref 40–53)
HGB BLD-MCNC: 9.4 G/DL (ref 13.3–17.7)
IMM GRANULOCYTES # BLD: 0 10E3/UL
IMM GRANULOCYTES NFR BLD: 0 %
INR PPP: 1.02 (ref 0.85–1.15)
LDH SERPL L TO P-CCNC: 203 U/L (ref 85–227)
LYMPHOCYTES # BLD AUTO: 0.5 10E3/UL (ref 0.8–5.3)
LYMPHOCYTES NFR BLD AUTO: 40 %
MAGNESIUM SERPL-MCNC: 2.4 MG/DL (ref 1.6–2.3)
MCH RBC QN AUTO: 32.6 PG (ref 26.5–33)
MCHC RBC AUTO-ENTMCNC: 34.3 G/DL (ref 31.5–36.5)
MCV RBC AUTO: 95 FL (ref 78–100)
MONOCYTES # BLD AUTO: 0.3 10E3/UL (ref 0–1.3)
MONOCYTES NFR BLD AUTO: 24 %
NEUTROPHILS # BLD AUTO: 0.4 10E3/UL (ref 1.6–8.3)
NEUTROPHILS NFR BLD AUTO: 33 %
NRBC # BLD AUTO: 0 10E3/UL
NRBC BLD AUTO-RTO: 0 /100
PHOSPHATE SERPL-MCNC: 3.8 MG/DL (ref 2.5–4.5)
PLAT MORPH BLD: ABNORMAL
PLATELET # BLD AUTO: 78 10E3/UL (ref 150–450)
POTASSIUM BLD-SCNC: 4.2 MMOL/L (ref 3.4–5.3)
PROT SERPL-MCNC: 5.8 G/DL (ref 6.8–8.8)
RBC # BLD AUTO: 2.88 10E6/UL (ref 4.4–5.9)
RBC MORPH BLD: ABNORMAL
SODIUM SERPL-SCNC: 138 MMOL/L (ref 133–144)
URATE SERPL-MCNC: 3.3 MG/DL (ref 3.5–7.2)
WBC # BLD AUTO: 1.2 10E3/UL (ref 4–11)

## 2022-01-24 PROCEDURE — 84550 ASSAY OF BLOOD/URIC ACID: CPT | Performed by: PHYSICIAN ASSISTANT

## 2022-01-24 PROCEDURE — 84100 ASSAY OF PHOSPHORUS: CPT | Performed by: PHYSICIAN ASSISTANT

## 2022-01-24 PROCEDURE — 82728 ASSAY OF FERRITIN: CPT | Performed by: PHYSICIAN ASSISTANT

## 2022-01-24 PROCEDURE — 85610 PROTHROMBIN TIME: CPT | Performed by: PHYSICIAN ASSISTANT

## 2022-01-24 PROCEDURE — 83615 LACTATE (LD) (LDH) ENZYME: CPT | Performed by: PHYSICIAN ASSISTANT

## 2022-01-24 PROCEDURE — 82040 ASSAY OF SERUM ALBUMIN: CPT | Performed by: PHYSICIAN ASSISTANT

## 2022-01-24 PROCEDURE — 250N000013 HC RX MED GY IP 250 OP 250 PS 637: Performed by: PHYSICIAN ASSISTANT

## 2022-01-24 PROCEDURE — 85025 COMPLETE CBC W/AUTO DIFF WBC: CPT | Performed by: PHYSICIAN ASSISTANT

## 2022-01-24 PROCEDURE — 36415 COLL VENOUS BLD VENIPUNCTURE: CPT | Performed by: PHYSICIAN ASSISTANT

## 2022-01-24 PROCEDURE — 85730 THROMBOPLASTIN TIME PARTIAL: CPT | Performed by: PHYSICIAN ASSISTANT

## 2022-01-24 PROCEDURE — 86140 C-REACTIVE PROTEIN: CPT | Performed by: PHYSICIAN ASSISTANT

## 2022-01-24 PROCEDURE — 82248 BILIRUBIN DIRECT: CPT | Performed by: PHYSICIAN ASSISTANT

## 2022-01-24 PROCEDURE — 85384 FIBRINOGEN ACTIVITY: CPT | Performed by: PHYSICIAN ASSISTANT

## 2022-01-24 PROCEDURE — 83735 ASSAY OF MAGNESIUM: CPT | Performed by: PHYSICIAN ASSISTANT

## 2022-01-24 PROCEDURE — 85379 FIBRIN DEGRADATION QUANT: CPT | Performed by: PHYSICIAN ASSISTANT

## 2022-01-24 PROCEDURE — 80053 COMPREHEN METABOLIC PANEL: CPT | Performed by: PHYSICIAN ASSISTANT

## 2022-01-24 RX ORDER — LEVOFLOXACIN 250 MG/1
250 TABLET, FILM COATED ORAL DAILY
Qty: 30 TABLET | Refills: 1 | Status: SHIPPED | OUTPATIENT
Start: 2022-01-25 | End: 2022-01-24

## 2022-01-24 RX ORDER — PROCHLORPERAZINE MALEATE 5 MG
5 TABLET ORAL 4 TIMES DAILY
Qty: 60 TABLET | Refills: 1 | Status: SHIPPED | OUTPATIENT
Start: 2022-01-24 | End: 2022-01-24

## 2022-01-24 RX ORDER — PROCHLORPERAZINE MALEATE 5 MG
5 TABLET ORAL 4 TIMES DAILY
Qty: 60 TABLET | Refills: 1 | Status: SHIPPED | OUTPATIENT
Start: 2022-01-24 | End: 2022-07-26

## 2022-01-24 RX ORDER — LEVOFLOXACIN 250 MG/1
250 TABLET, FILM COATED ORAL DAILY
Qty: 30 TABLET | Refills: 1 | Status: SHIPPED | OUTPATIENT
Start: 2022-01-25 | End: 2022-01-26

## 2022-01-24 RX ADMIN — ALLOPURINOL 300 MG: 300 TABLET ORAL at 07:53

## 2022-01-24 RX ADMIN — PROCHLORPERAZINE MALEATE 5 MG: 5 TABLET ORAL at 07:52

## 2022-01-24 RX ADMIN — PROCHLORPERAZINE MALEATE 5 MG: 5 TABLET ORAL at 00:10

## 2022-01-24 RX ADMIN — TOLTERODINE 4 MG: 4 CAPSULE, EXTENDED RELEASE ORAL at 07:53

## 2022-01-24 RX ADMIN — OXYCODONE HYDROCHLORIDE 5 MG: 5 TABLET ORAL at 00:10

## 2022-01-24 RX ADMIN — LEVOFLOXACIN 250 MG: 250 TABLET, FILM COATED ORAL at 07:53

## 2022-01-24 RX ADMIN — ACETAMINOPHEN 650 MG: 325 TABLET, FILM COATED ORAL at 07:51

## 2022-01-24 RX ADMIN — PSYLLIUM HUSK 1 PACKET: 3.4 POWDER ORAL at 07:54

## 2022-01-24 RX ADMIN — ACYCLOVIR 800 MG: 800 TABLET ORAL at 07:55

## 2022-01-24 RX ADMIN — FLUCONAZOLE 100 MG: 100 TABLET ORAL at 07:54

## 2022-01-24 RX ADMIN — OMEPRAZOLE 20 MG: 20 CAPSULE, DELAYED RELEASE ORAL at 07:53

## 2022-01-24 ASSESSMENT — ACTIVITIES OF DAILY LIVING (ADL)
ADLS_ACUITY_SCORE: 4

## 2022-01-24 ASSESSMENT — MIFFLIN-ST. JEOR: SCORE: 1801.12

## 2022-01-24 NOTE — DISCHARGE SUMMARY
[facility] Discharge Summary   Juvenal Blake MRN# [id number]   Age: 56 year old  YOB: 1965   Date of Admission: 1/17/2022  Date of Discharge:  1/24/2022  Admitting Physician: Aurelio Frey MD  Discharge Physician: Aurelio Frey MD   Discharge Diagnoses:    1. S/p Yescarta CART cell infusion for DLBCL  2. Pancytopenia due to chemotherapy  3. Chemotherapy induced nausea  4. severe malnutrition 2/2 chronic illness/cancer (based on wt loss)  5. Abdominal pain due to cancer diagnosis  Discharge Medications:         Review of your medicines      START taking      Dose / Directions   levofloxacin 250 MG tablet  Commonly known as: LEVAQUIN  Indication: prophy  Used for: Follicular lymphoma grade i, lymph nodes of multiple sites (H)      Dose: 250 mg  Start taking on: January 25, 2022  Take 1 tablet (250 mg) by mouth daily  Quantity: 30 tablet  Refills: 1     prochlorperazine 5 MG tablet  Commonly known as: COMPAZINE  Used for: Follicular lymphoma grade i, lymph nodes of multiple sites (H)      Dose: 5 mg  Take 1 tablet (5 mg) by mouth 4 times daily  Quantity: 60 tablet  Refills: 1        CONTINUE these medicines which have NOT CHANGED      Dose / Directions   acyclovir 800 MG tablet  Commonly known as: ZOVIRAX  Used for: Follicular lymphoma grade i, lymph nodes of multiple sites (H)      Dose: 800 mg  Take 1 tablet (800 mg) by mouth every 12 hours  Quantity: 60 tablet  Refills: 0     allopurinol 300 MG tablet  Commonly known as: ZYLOPRIM      Dose: 300 mg  Take 1 tablet (300 mg) by mouth daily  Quantity: 30 tablet  Refills: 1     benzonatate 100 MG capsule  Commonly known as: TESSALON  Used for: Cough      Dose: 100 mg  Take 1 capsule (100 mg) by mouth 3 times daily as needed for cough  Quantity: 30 capsule  Refills: 0     FIBER-CAPS PO      Dose: 2 capsule  Take 2 capsules by mouth daily  Refills: 0     fluconazole 100 MG tablet  Commonly known as: DIFLUCAN  Used for: Follicular  lymphoma grade i, lymph nodes of multiple sites (H)      Dose: 100 mg  Take 1 tablet (100 mg) by mouth daily  Quantity: 30 tablet  Refills: 1     guaiFENesin-codeine 100-10 MG/5ML solution  Commonly known as: ROBITUSSIN AC  Used for: Cough      Dose: 1-2 teaspoonful  Take 5-10 mLs by mouth every 4 hours as needed for cough  Quantity: 180 mL  Refills: 0     medical cannabis  (Patient's own supply)      Dose: 1 Dose  1 Dose daily as needed (The purpose of this order is to document that the patient reports taking medical cannabis)  Refills: 0     omeprazole 20 MG DR capsule  Commonly known as: priLOSEC  Used for: Follicular lymphoma grade i, lymph nodes of multiple sites (H)      TAKE 2 CAPSULES(40 MG) BY MOUTH DAILY  Quantity: 180 capsule  Refills: 3     Qvar RediHaler 80 MCG/ACT inhaler  Generic drug: beclomethasone HFA      Dose: 1 puff  Inhale 1 puff into the lungs daily as needed (for reactive airway symptoms)  Refills: 0     tolterodine ER 4 MG 24 hr capsule  Commonly known as: DETROL LA  Used for: Follicular lymphoma grade i, lymph nodes of multiple sites (H)      Dose: 4 mg  Take 1 capsule (4 mg) by mouth daily  Quantity: 90 capsule  Refills: 11     Ventolin  (90 Base) MCG/ACT inhaler  Generic drug: albuterol      INHALE 2 PUFFS BY MOUTH FOUR TIMES DAILY AS NEEDED FOR COUGH OR CONGESTION  Refills: 0        STOP taking    sulfamethoxazole-trimethoprim 800-160 MG tablet  Commonly known as: BACTRIM DS              Where to get your medicines      These medications were sent to [address] [place] [address] [address] [address]    Hours: TRANSPLANT PHONE NUMBER [phone] Phone: [phone]     levofloxacin 250 MG tablet    prochlorperazine 5 MG tablet         Brief History of Illness:    **Adopted from H&P  Mr. Blake is a 56 year old man with relapsed/refractory NHL. Most recent relapse (FL) confirmed on 12/20 on biopsy  of a node. He has been treated so far with BR, OCHOP, NAM NK (9/1/20),  (6/28/21), idelalisib, and Mike. Recent URI + parainfluenza (12/23/21; neg RVP 1/17/22). He is now proceeding with Yescarta CAR-T therapy. Received fludarabine/Cytoxan x3 days last week (outpatient). Admission today for cells and will remain admitted at least 7 days for close monitoring of CRS, neurotoxicity.    Hospital Course:    Juvenal Blake is a 56 year old male PMH significant for refractory NHL, Day +7 of Yescarta CAR-T therapy     1. Follicular Lymphoma/[facility]a CAR-T:   Relapsed after BR, [facility], NAM NK (9/1/20), idelalisib (~4-6/2020),  (6/28/21), polatuzumab (12/21/21). (hx Rituxan reaction).   - LD chemo with fludarabine/Cytoxan 1/12-1/14/22 prior to CAR-T infusion.    - cont allopurinol  - Day 0 [facility]a CAR-T 1/17/22  ICE 10/10 to date.  Daily CAR-Tox note through at least D14.     2. HEME: Keep hgb >7g, plt>10.  #pancytopenia 2/2 chemo/lymphoma.      3. ID: Afebrile.  #Prophy: acyclovir, fluconazole, Levaquin-started on admission. Inhaled Pentamidine 1/13/21.    #Hx C diff colitis. Screening C.dif + on admission. Asymptomatic. If develops diarrhea, will Rx Vanco.     #hx parainfluenza 3 (12/21/21): Occasional cough, otherwise sx improved.  #hx rhinovirus (10/6/21).      #S/p 3 covid shots; first two were pre- and booster was after.  Defer to car -t team regarding if revaccination needed post CART.      4. GI:    #JACQUE: schedule Compazine qid (8a, 1pm, 6p, 10pm). Ativan available prn. Can take compazine as needed at home  - cont PTA omeprazole  -  hx medical cannibis.  #Abdominal Pain secondary to cancer. Minimal complaints currently.      5. Renal/FEN: Creat, lytes wnl.  - replete prn per sliding scale  #severe malnutrition 2/2 chronic illness/cancer (based on wt loss). Cont high arnaldo/protein diet. No current intake concerns.     6. CV:    Cardiology cleared him to proceed with no further testing. The CT angiogram  showed no lesion requiring stenting or bypass.     7. :   #hx Prostatic adenocarcinoma: s/p radical prostatectomy and bilateral lymph node dissection 11/2017. Completed radiation to the prostate fossa and pelvic lymph nodes at [facility] early May 2018. He received 4500 cGy in 25 fractions. He then had 2340 cGy boost in 13 fractions to the prostatic fossa, for a total dose of 6240 cGy in 38 treatment fractions delivered over 54 days. He did not receive hormonal therapy:    9/10 PSA: 0.71 - no concerns.      I spent 60 minutes face-to-face and/or coordinating care. Over 50% of our time on the unit was spent counseling the patient and/or coordinating care regarding post transplant care, management and evaluation of symptoms.       Dispo: discharge to home today with daily follow up in BMT clinic for assessment through D+14.   - removed PICC before discharge, has port  Discharge Instructions and Follow-Up:    Discharge diet: Regular diet as tolerated  Discharge activity: Activity as tolerated   Discharge follow-up: Follow up with BMT Clinic as follows:  - requested daily BMT appts with labs and provider visit for the next 7 days, morning arrival around 9am if possible  Discharge Disposition:    Discharged to home.    Irma Sneed PA-C  [phone]

## 2022-01-24 NOTE — SUMMARY OF CARE
Discharge Medications    - stop taking bactrim (received pentamidine 1/13 instead, will repeat monthly       Review of your medicines      START taking      Dose / Directions   levofloxacin 250 MG tablet  Commonly known as: LEVAQUIN  Indication: prophy  Used for: Follicular lymphoma grade i, lymph nodes of multiple sites (H)      Dose: 250 mg  Start taking on: January 25, 2022  Take 1 tablet (250 mg) by mouth daily  Quantity: 30 tablet  Refills: 1     prochlorperazine 5 MG tablet  Commonly known as: COMPAZINE  Used for: Follicular lymphoma grade i, lymph nodes of multiple sites (H)      Dose: 5 mg  Take 1 tablet (5 mg) by mouth 4 times daily  Quantity: 60 tablet  Refills: 1        CONTINUE these medicines which have NOT CHANGED      Dose / Directions   acyclovir 800 MG tablet  Commonly known as: ZOVIRAX  Used for: Follicular lymphoma grade i, lymph nodes of multiple sites (H)      Dose: 800 mg  Take 1 tablet (800 mg) by mouth every 12 hours  Quantity: 60 tablet  Refills: 0     allopurinol 300 MG tablet  Commonly known as: ZYLOPRIM      Dose: 300 mg  Take 1 tablet (300 mg) by mouth daily  Quantity: 30 tablet  Refills: 1     benzonatate 100 MG capsule  Commonly known as: TESSALON  Used for: Cough      Dose: 100 mg  Take 1 capsule (100 mg) by mouth 3 times daily as needed for cough  Quantity: 30 capsule  Refills: 0     FIBER-CAPS PO      Dose: 2 capsule  Take 2 capsules by mouth daily  Refills: 0     fluconazole 100 MG tablet  Commonly known as: DIFLUCAN  Used for: Follicular lymphoma grade i, lymph nodes of multiple sites (H)      Dose: 100 mg  Take 1 tablet (100 mg) by mouth daily  Quantity: 30 tablet  Refills: 1     guaiFENesin-codeine 100-10 MG/5ML solution  Commonly known as: ROBITUSSIN AC  Used for: Cough      Dose: 1-2 teaspoonful  Take 5-10 mLs by mouth every 4 hours as needed for cough  Quantity: 180 mL  Refills: 0     medical cannabis  (Patient's own supply)      Dose: 1 Dose  1 Dose daily as needed (The  purpose of this order is to document that the patient reports taking medical cannabis)  Refills: 0     omeprazole 20 MG DR capsule  Commonly known as: priLOSEC  Used for: Follicular lymphoma grade i, lymph nodes of multiple sites (H)      TAKE 2 CAPSULES(40 MG) BY MOUTH DAILY  Quantity: 180 capsule  Refills: 3     Qvar RediHaler 80 MCG/ACT inhaler  Generic drug: beclomethasone HFA      Dose: 1 puff  Inhale 1 puff into the lungs daily as needed (for reactive airway symptoms)  Refills: 0     tolterodine ER 4 MG 24 hr capsule  Commonly known as: DETROL LA  Used for: Follicular lymphoma grade i, lymph nodes of multiple sites (H)      Dose: 4 mg  Take 1 capsule (4 mg) by mouth daily  Quantity: 90 capsule  Refills: 11     Ventolin  (90 Base) MCG/ACT inhaler  Generic drug: albuterol      INHALE 2 PUFFS BY MOUTH FOUR TIMES DAILY AS NEEDED FOR COUGH OR CONGESTION  Refills: 0        STOP taking    sulfamethoxazole-trimethoprim 800-160 MG tablet  Commonly known as: BACTRIM DS              Where to get your medicines      These medications were sent to [facility] [place], [place] - [address] [address]    Phone: [phone]     levofloxacin 250 MG tablet    prochlorperazine 5 MG tablet

## 2022-01-24 NOTE — PROGRESS NOTES
"BMT Daily CARTOX Note (complete days 0-14 and with any subsequent CRS/neurotoxicity)    Date: January 24, 2022    Juvenal Blake ([id number]) is a 56 year old year old male who received infusion on 1/17/22, currently day 7 of CAR-T cell therapy Yescarta    Vital Signs: /73 (BP Location: Right arm)   Pulse 91   Temp 98.5  F (36.9  C) (Axillary)   Resp 16   Ht 1.715 m (5' 7.52\")   Wt 101.4 kg (223 lb 9.6 oz)   SpO2 96%   BMI 34.48 kg/m      1) CRS Grading (based ONLY on parameters in box below)    Fever:   </= 100.4    Blood pressure:   SBP >/= 90 (not hypotensive)    Oxygen saturation:    >/= 90% on room air (not hypoxic)    CRS Parameter Grade 1  Grade 2 Grade 3 Grade 4   Fever* Tm >= 100.4 degrees F Tm >= 100.4 degrees F Tm >= 100.4 degrees F Tm >= to 100.4  degrees F      With Either:  Hypotension (SBP <90) None Responsive to Fluids  Requiring 1  vasopressor (w/ or w/o vasopressin) Requiring multiple  vasopressors  (excluding  vasopressin)     And/or  Hypoxia (O2 sats <90% on room air) None Low-flow nasal  cannula or blow-by High-flow nasal  cannula, face mask,  non-rebreather mask,or Venturi mask  Requiring positive  pressure (CPAP,  BiPAP intubation and  mechanical  ventilation)     CRS Summary  Does patient have CRS? No  Current CRS stgstrstastdstest:st st1st What therapy was given: na  Has CRS grade changed? na  Has CRS resolved? na    2) Neurotoxicity:    ICE Assessment  Orientation to year, month, city, hospital: 4 points, Name 3 objects (e.g., point to clock, pen, button): 3 point, Following commands: (e.g., Show me 2 fingers): 1 point, Write a standard sentence (e.g., The tang jumped over the log): 1 point and Count backwards from 100 by ten: 1 point  Total points: 10: No impairment    ASTCT ICANS Consensus Grading for Adults  ICE Score   10    Seizure   No seizures    Motor Findings   No motor findings    Elevated ICP/Cerebral Edema   None      Neurotoxicity  Domain Grade 1 Grade 2 Grade 3 Grade 4   ICE score " 7-9 3-6 1-2 0   Depressed LOC      Awakens  spontaneously Awakens to  voice  Awakens only to  tactile stimulation Unarousable or  requires vigorous  or repetitive  tactile stimuli to  arouse. Stupor  or coma.   Seizure n/a n/a Any clinical  seizure focal or  generalized that  resolves rapidly  or nonconvulsive  seizures on EEG  that resolve with  intervention  Life-threatening  prolonged  seizure (>5 min);  or Repetitive  clinical or  electrical  seizures without  return to baseline  in between    Motor Findings      n/a n/a n/a Deep focal motor  weakness such  as hemiparesis  or paraparesis   Elevated  ICP/cerebral  edema  n/a n/a Focal/local  edema on  neuroimaging  Diffuse cerebral  edema on  neuroimaging;  decerebrate or  decorticate  posturing; or  cranial nerve VI  palsy; or  papilledema; or  Cushing's triad     ICANS grade is determined by the most severe event (ICE score, level of consciousness, seizure, motor findings, raised ICP/cerebral edema) not attributable to any other cause; for example, a patient with an ICE score of 3 who has a generalized seizure is classified as grade 3 ICANS.    A patient with an ICE score of 0 may be classified as grade 3 ICANS if awake with global aphasia, but a patient with an ICE score of 0 may be classified as grade 4 ICANS if unarousable.     Depressed level of consciousness should be attributable to no other cause (eg, no sedating medication)    Tremors and myoclonus associated with immune effector cell therapies may be graded according to CTCAE v5.0,but they do not influence ICANS grading.     Intracranial hemorrhage with or without associated edema is not considered a neurotoxicity feature and is  excluded from ICANS grading. It may be graded according to CTCAE v5.0.        Neurotoxicity Summary  Does patient have neurotoxicity? No  Current Neurotoxicity score (0-10): 0  What therapy was given: na  Has neurotoxicity grade changed? na  Has neurotoxicity resolved?  "na      3) Organ CAR-T toxicity:    Cardiac   none    Respiratory   none    Gastrointestinal   none    Hepatic    none    Skin   none     Coagulopathy    none     Other: occasional mild HA, subjective face \"tightening\"/\"flushing\" - monitor for now.      4) HLH   no     * CTCAE grading can be found at   https://ctep.cancer.gov/protocoldevelopment/electronic_applications/docs/CTCAE_v5_Quick Reference_8.5x11.pdf    Irma Sneed PA-C  [phone]      "

## 2022-01-24 NOTE — PROGRESS NOTES
"BMT/Cell Therapy Daily Progress Note   01/24/2022    Patient ID:  Juvenal Blake is a 56 year old male, currently day +7 s/p Yescarta CAR-T.     Diagnosis NHLFL Non-Hodgkin lymphoma, Follicular, predominantly large cell  BMTCT Type Cellular Therapy    Prep Regimen Fludarabine, Cytoxan  Donor Source Autologous     GVHD Prophylaxis NA  Primary BMT MD Dr. Irwin    Clinical Trials   2017-45         Admission date: 1/17/2022    INTERVAL  HISTORY     Juvenal is feeling the same this morning. No fevers or CRS like symptoms. No neurological deficits. Intermittent mild headache, takes prn tylenol and received oxy last night. This has been ongoing since chemo prior to cell infusion. Does talk about facial flushing (like hot air blowing in his face). Not dizzy. Formed stools. Appetite isn't great. Nausea controlled with compazine. No emesis. Ok for discharge today. Will remove PICC prior. Request daily appts x 7 more days in clinic.    Review of Systems: 10 point ROS negative except as noted above.      PHYSICAL EXAM     KPS:  80    /73 (BP Location: Right arm)   Pulse 91   Temp 98.5  F (36.9  C) (Axillary)   Resp 16   Ht 1.715 m (5' 7.52\")   Wt 101.4 kg (223 lb 9.6 oz)   SpO2 96%   BMI 34.48 kg/m        General: NAD   Eyes: sclera anicteric   Nose/Mouth/Throat: OP moist, no ulcerations   Lungs: CTA bilaterally  Cardiovascular: RRR, no M/R/G   Abdominal/Rectal: +BS, soft, NT, obese, No HSM   Lymphatics: trace LE edema  Skin: No rash  Neuro: non-focal  Access: L arm PICC dressing cdi. R chest PAC.    ICE 10/10 (1/24)     LABS AND IMAGING: I have assessed all abnormal lab values for their clinical significance and any values considered clinically significant have been addressed in the assessment and plan.       Lab Results   Component Value Date    WBC 1.2 (L) 01/24/2022    ANEU 0.4 (LL) 01/23/2022    HGB 9.4 (L) 01/24/2022    HCT 27.4 (L) 01/24/2022    PLT 78 (L) 01/24/2022     01/24/2022    POTASSIUM 4.2 " 01/24/2022    CHLORIDE 105 01/24/2022    CO2 29 01/24/2022     (H) 01/24/2022    BUN 16 01/24/2022    CR 1.18 01/24/2022    MAG 2.4 (H) 01/24/2022    INR 1.02 01/24/2022    BILITOTAL 0.7 01/24/2022    AST 17 01/24/2022    ALT 41 01/24/2022    ALKPHOS 94 01/24/2022    PROTTOTAL 5.8 (L) 01/24/2022    ALBUMIN 3.7 01/24/2022       SYSTEMS-BASED ASSESSMENT AND PLAN      Juvenal Blake is a 56 year old male PMH significant for refractory NHL, Day +7 of Yescarta CAR-T therapy     1. Follicular Lymphoma/Yescarta CAR-T:   Relapsed after BR, [facility], [facility] NK (9/1/20), idelalisib (~4-6/2020),  (6/28/21), polatuzumab (12/21/21). (hx Rituxan reaction).   - LD chemo with fludarabine/Cytoxan 1/12-1/14/22 prior to CAR-T infusion.    - cont allopurinol  - Day 0 Yescarta CAR-T 1/17/22  ICE 10/10 to date.  Daily CAR-Tox note through at least D14.     2. HEME: Keep hgb >7g, plt>10.  #pancytopenia 2/2 chemo/lymphoma.      3. ID: Afebrile.  #Prophy: acyclovir, fluconazole, Levaquin-started on admission. Inhaled Pentamidine 1/13/21.    #Hx C diff colitis. Screening C.dif + on admission. Asymptomatic. If develops diarrhea, will Rx Vanco.    #hx parainfluenza 3 (12/21/21): Occasional cough, otherwise sx improved.  #hx rhinovirus (10/6/21).      #S/p 3 covid shots; first two were pre- and booster was after.  Defer to car -t team regarding if revaccination needed post CART.      4. GI:    #JACQUE: schedule Compazine qid (8a, 1pm, 6p, 10pm). Ativan available prn. Can take compazine as needed at home  - cont PTA omeprazole  -  hx medical cannibis.  #Abdominal Pain secondary to cancer. Minimal complaints currently.      5. Renal/FEN: Creat, lytes wnl.  - replete prn per sliding scale  #severe malnutrition 2/2 chronic illness/cancer (based on wt loss). Cont high arnaldo/protein diet. No current intake concerns.     6. CV:    Cardiology cleared him to proceed with no further testing. The CT angiogram showed no lesion requiring stenting or  bypass.     7. :   #hx Prostatic adenocarcinoma: s/p radical prostatectomy and bilateral lymph node dissection 11/2017. Completed radiation to the prostate fossa and pelvic lymph nodes at [facility] early May 2018. He received 4500 cGy in 25 fractions. He then had 2340 cGy boost in 13 fractions to the prostatic fossa, for a total dose of 6240 cGy in 38 treatment fractions delivered over 54 days. He did not receive hormonal therapy:    9/10 PSA: 0.71 - no concerns.     I spent 60 minutes face-to-face and/or coordinating care. Over 50% of our time on the unit was spent counseling the patient and/or coordinating care regarding post transplant care, management and evaluation of symptoms.      Dispo: discharge to home today with daily follow up in BMT clinic for assessment through D+14.   - removed PICC before discharge, has diane Sneed PA-C  [phone]

## 2022-01-24 NOTE — PROGRESS NOTES
BMT Teaching Flowsheet    Juvenal Blake is a 56 year old male  The primary encounter diagnosis was Follicular lymphoma grade I, unspecified body region (H). A diagnosis of Follicular lymphoma grade i, lymph nodes of multiple sites (H) was also pertinent to this visit.    Teaching Topic: Yescarta CAR-T infusion discharge teaching    **PHONE TEACH**    Person(s) involved in teaching: Patient and Spouse  Motivation Level  Asks Questions: Yes  Eager to Learn: Yes  Cooperative: Yes  Receptive (willing/able to accept information): Yes  Any cultural factors/Christianity beliefs that may influence understanding or compliance? No    Patient and Caregiver demonstrates understanding of the following:  - Reason for the appointment, diagnosis and treatment plan: Yes  - Knowledge of proper use of medications and conditions for which they are ordered (with special attention to potential side effects or drug interactions): No, explain: Bedside RN to complete medication teaching with patient and caregiver prior to discharge.   - Which situations necessitate calling provider and whom to contact: Yes    Teaching concerns addressed: None identified    Proper use and care of (medical equipment, care aids, etc.) Yes  Pain management techniques: Yes  Patient instructed on hand hygiene: Yes  How and/when to access community resources: Yes    Infection Control:  Patient and Caregiver demonstrates understanding of the following:  Surgical procedure site care taught NA  Signs and symptoms of infection taught Yes  Wound care taught NA  Central venous catheter care taught No, explain: Patient and caregiver previously received teaching, and decline further need     Instructional Materials Used/Given:   Discharge teaching completed with patient and family. Written guidelines provided including clinic follow up, signs and symptoms to report, infection prevention, and contact list. Discussed activities to avoid to prevent infections and mask  guidelines. Pt and family verbalize understanding of material via teach back and all questions have been answered.    For Kymriah/Yescarta/CAR-T patient wallet card given. Patient instructed to remain within close proximity (at least two hours) for at least four weeks post infusion. CAR-T patient is instructed not to operate motorized vehicle or heavy machinery for a period of 8 weeks.    Time spent with patient: 20 minutes.    Specific Concerns: NA

## 2022-01-24 NOTE — SUMMARY OF CARE
BMT Summary of Care    January 24, 2022 10:11 AM  Juvenal Blake  MRN: [id number]    Discharge Date: 1/24/22    BMT Primary Physician: Dr. Terry    BMT Nurse Coordinator: Arlen Lui    Discharge Diagnosis: S/p Yescarta    Discharge To: Home    Activity: As tolerated    Catheter Care: Port-a-cath - Flush each line with 5mL of 100 unit/mL heparin every 28 days    Vascular Access Device Protocol Per Policy  Supplies through home infusion  [facility]  Fax: [phone]  Ph: [phone]       IV Medications through home infusion: None    Nutrition: Regular diet as tolerated    Blood Transfusions:  Transfuse if Hemoglobin < or equal 7 mg/dL  Red Blood Cell Order: 1 units, irradiated and leukoreduced   Transfuse if Platelet count < or equal 10,000 uL  Platelet order: 1 adult dose, irradiated and leukoreduced  Transfusion Pre-meds:  None    Intravenous Electrolyte Replacement:  Potassium  chloride (give only if serum creatinine < 2)     3-3.3  20mEq/hr  over 1 hour x 2 doses     <3      20mEq/hr  over 1 hour x 3 doses  Magnesium sulfate 1.3-1.7     2 grams over 1 hour x1 dose                                     <1.3         2 grams over 2 hours x1 dose      Laboratory Tests:  At next clinic appointment (date: 1/25/22)  Hemogram (CBC) differential, platelet count  Magnesium  Comprehensive Metabolic Panel    Support Services:  None    Appointments:   BMT Clinic (date, time, provider):   - requested daily BMT appts with labs and provider visit for hte next 7 days, morning arrival around 9am if possible    Irma Sneed PA-C      BMT Contact Information:-   For issues including fevers 100.5 or more:  Please call during the week: Monday through Friday between hours of 8:00 am and 4:30 pm- Call BMT office- [phone]  After hours/Weekends: Please call [facility] : [phone] and ask for BMT physician on call and the  will have the BMT physician call  you back.

## 2022-01-24 NOTE — PLAN OF CARE
Occupational Therapy Discharge Summary    Reason for therapy discharge:    Discharged to home.    Progress towards therapy goal(s). See goals on Care Plan in [facility] electronic health record for goal details.  Goals partially met.  Barriers to achieving goals:   discharge from facility.    Therapy recommendation(s):    Continue home exercise program. Recommend assist from caregiver prn.

## 2022-01-24 NOTE — PROGRESS NOTES
s-pt reports headache returned and is the same-requesting tylenol. afebrile at that time and thus did give tylenol for analgesia. Is neutropenic- instructed on Incentive spirometry. Able to do return demonstarion properly .Up in room-wife at bedside supportive and interactive. Received fluid bolus for lightheadedness. Has been doen in fluids the past few days and down by 5lbs since admission on the 17th. -had salad for supper  b-CAR-T day 6  A-pt continues with selfcare  r-encourged IS us 4x daily. encourge food and fluid intake. Encourage pt to continue to report s/s of HA and discomforts or any concerns. Pt does not offer much independantly.

## 2022-01-24 NOTE — PLAN OF CARE
VSS. Afebrile. Up independently in room. PO 5mg Oxy given x1 for 2/10 headache. Adequate intake & output. No replacements needed this AM. He is looking forward to discharge. Will continue plan of care.    Problem: Adult Inpatient Plan of Care  Goal: Plan of Care Review  Outcome: No Change  Goal: Patient-Specific Goal (Individualized)  Outcome: No Change  Goal: Absence of Hospital-Acquired Illness or Injury  Outcome: No Change  Intervention: Identify and Manage Fall Risk  Recent Flowsheet Documentation  Taken 1/24/2022 0010 by Raul Sanford RN  Safety Promotion/Fall Prevention:    assistive device/personal items within reach    clutter free environment maintained    nonskid shoes/slippers when out of bed    patient and family education    room organization consistent    safety round/check completed  Intervention: Prevent Skin Injury  Recent Flowsheet Documentation  Taken 1/24/2022 0010 by Raul Sanford RN  Body Position: position changed independently  Intervention: Prevent and Manage VTE (Venous Thromboembolism) Risk  Recent Flowsheet Documentation  Taken 1/24/2022 0010 by Raul Sanford RN  VTE Prevention/Management:    ambulation promoted    fluids promoted  Goal: Optimal Comfort and Wellbeing  Outcome: No Change  Goal: Readiness for Transition of Care  Outcome: No Change     Problem: Discharge Planning  Goal: Discharge Planning (Adult, OB, Behavioral, Peds)  Outcome: No Change     Problem: Adjustment to Transplant (Stem Cell/Bone Marrow Transplant)  Goal: Optimal Coping with Transplant  Outcome: No Change     Problem: Fatigue (Stem Cell/Bone Marrow Transplant)  Goal: Energy Level Supports Daily Activity  Outcome: No Change      Problem: Infection Risk (Stem Cell/Bone Marrow Transplant)  Goal: Absence of Infection  Outcome: No Change  Intervention: Prevent and Manage Infection  Recent Flowsheet Documentation  Taken 1/24/2022 0010 by Raul Sanford, RN  Isolation Precautions:    protective environment  maintained    enteric precautions maintained     Problem: Mucositis (Stem Cell/Bone Marrow Transplant)  Goal: Mucous Membrane Health and Integrity  Outcome: No Change     Problem: Nausea and Vomiting (Stem Cell/Bone Marrow Transplant)  Goal: Nausea and Vomiting Symptom Relief  Outcome: No Change  Intervention: Prevent and Manage Nausea and Vomiting  Recent Flowsheet Documentation  Taken 1/24/2022 0010 by Raul Sanford RN  Nausea/Vomiting Interventions: antiemetic     Problem: Nutrition Intake Altered (Stem Cell/Bone Marrow Transplant)  Goal: Optimal Nutrition Intake  Outcome: No Change

## 2022-01-24 NOTE — PROGRESS NOTES
"Care Management Discharge Note    Discharge Date: 01/25/2022     Discharge Disposition: Home  [address] [place] [address]    Discharge Services/DME:  See BMT RNCC for community discharge needs.    Discharge Transportation: family or friend will provide (Wife)    Private pay costs discussed: Not applicable    PAS Confirmation Code:  N/A  Patient/family educated on Medicare website which has current facility and service quality ratings: other (see comments) (TBD) - see RNCC    Education Provided on the Discharge Plan:  Yes  Persons Notified of Discharge Plans: Pt, Wife, IDT  Patient/Family in Agreement with the Plan: yes    Handoff Referral Completed: No - N/A    Additional Information:  BMT SOCIAL WORK DISCHARGE NOTE:    Focus: Discharge Plan    Data: Juvenal Blake is a 56 year old male, currently day +7 s/p Yescarta CAR-T.    Interventions: Per Med Team, pt is medically stable for discharge today. SW met with pt to assess coping & provide psychosocial support. Pt submitted [employer] ryan (to be sent for processing) and applied for [employer] last week. Pt states he has yet to apply for [employer] Assistance and will notify SW when this is complete. Pt is having difficulty using the [employer] online portal and will likely apply by phone. Pt states he is feeling \"anxious to leave today.\" Pt denied additional questions or concerns at this time. SW encouraged pt to contact SW for support, questions and/or resources.     Education Provided: Discharge Resource Packet, Caregiver Self-Care Education, and Expected Emotional Responses to Transition Home.    Assessment: Pt presented as pleasant, calm, and engaged. Pt appears to be coping appropriately. Pt continues to be supported by his wife.    Plan: Pt to discharge Home ([place]) with Wife (Nancy) as primary caregiver. SW will continue to provide psychosocial support and assistance with resources as needed. SW will continue to collaborate with multidisciplinary team " regarding pt's plan of care.     CITLALLI Cunningham, [facility]  Adult Blood & Marrow Transplant   Phone: [phone]  Pager: [phone]

## 2022-01-24 NOTE — PROGRESS NOTES
Care Coordination - Discharge Note     Line Company:  NA - PICC line removed for discharge; pt otherwise only has port-a-cath (managed in clinic)   Referral made for line care:  No  IV medications needed at discharge:  None   Pharmacy concerns:  None    PT/OT/therapies recommended:  No  Referral made for PT/OT/therapies:  NA    D/c location: [place] [facility] [place]  Has placement need been communicated to Social Work?  NA    NC Teaching time arranged with patient/caregiver:  Mon 1/24 at 2pm via phone call with pt and wife  Notify nurse to schedule line care class/ DM teaching, prior to d/c:  NA - see above     Caregiver: Nancy (wife) [phone]      Outpatient Nurse Coordinator notified of patient discharge.       Tari Sol, RN, BSN  RN Care Coordinator - Inpatient BMT, Unit 5C  Ph [phone]   [phone]

## 2022-01-24 NOTE — PROGRESS NOTES
Pt discharged to: Home  Via: wife's vehicle  Accompanied by:Nurse  Belongings:Brought with patient  Teaching:Went over discharge instructions and medication list  Clinic appointment:Tomorrow, time in [facility]  Report called/faxed:NA  Local housing:[facility]

## 2022-01-25 ENCOUNTER — ALLIED HEALTH/NURSE VISIT (OUTPATIENT)
Dept: TRANSPLANT | Facility: CLINIC | Age: 57
End: 2022-01-25
Payer: COMMERCIAL

## 2022-01-25 ENCOUNTER — MEDICAL CORRESPONDENCE (OUTPATIENT)
Dept: HEALTH INFORMATION MANAGEMENT | Facility: CLINIC | Age: 57
End: 2022-01-25

## 2022-01-25 ENCOUNTER — ONCOLOGY VISIT (OUTPATIENT)
Dept: TRANSPLANT | Facility: CLINIC | Age: 57
End: 2022-01-25
Payer: COMMERCIAL

## 2022-01-25 ENCOUNTER — APPOINTMENT (OUTPATIENT)
Dept: LAB | Facility: CLINIC | Age: 57
End: 2022-01-25
Payer: COMMERCIAL

## 2022-01-25 VITALS
RESPIRATION RATE: 20 BRPM | DIASTOLIC BLOOD PRESSURE: 76 MMHG | WEIGHT: 225.6 LBS | OXYGEN SATURATION: 99 % | BODY MASS INDEX: 34.79 KG/M2 | SYSTOLIC BLOOD PRESSURE: 133 MMHG | TEMPERATURE: 98.1 F | HEART RATE: 73 BPM

## 2022-01-25 DIAGNOSIS — C82.08 FOLLICULAR LYMPHOMA GRADE I, LYMPH NODES OF MULTIPLE SITES (H): Primary | ICD-10-CM

## 2022-01-25 DIAGNOSIS — Z85.46 HISTORY OF PROSTATE CANCER: ICD-10-CM

## 2022-01-25 DIAGNOSIS — C82.08 FOLLICULAR LYMPHOMA GRADE I, LYMPH NODES OF MULTIPLE SITES (H): ICD-10-CM

## 2022-01-25 LAB
ALBUMIN SERPL-MCNC: 3.9 G/DL (ref 3.4–5)
ALP SERPL-CCNC: 99 U/L (ref 40–150)
ALT SERPL W P-5'-P-CCNC: 46 U/L (ref 0–70)
ANION GAP SERPL CALCULATED.3IONS-SCNC: 8 MMOL/L (ref 3–14)
AST SERPL W P-5'-P-CCNC: 21 U/L (ref 0–45)
BASOPHILS # BLD AUTO: 0 10E3/UL (ref 0–0.2)
BASOPHILS NFR BLD AUTO: 1 %
BILIRUB SERPL-MCNC: 0.5 MG/DL (ref 0.2–1.3)
BUN SERPL-MCNC: 15 MG/DL (ref 7–30)
CALCIUM SERPL-MCNC: 9.2 MG/DL (ref 8.5–10.1)
CHLORIDE BLD-SCNC: 107 MMOL/L (ref 94–109)
CO2 SERPL-SCNC: 27 MMOL/L (ref 20–32)
CREAT SERPL-MCNC: 1.09 MG/DL (ref 0.66–1.25)
EOSINOPHIL # BLD AUTO: 0 10E3/UL (ref 0–0.7)
EOSINOPHIL NFR BLD AUTO: 3 %
ERYTHROCYTE [DISTWIDTH] IN BLOOD BY AUTOMATED COUNT: 14.5 % (ref 10–15)
GFR SERPL CREATININE-BSD FRML MDRD: 79 ML/MIN/1.73M2
GLUCOSE BLD-MCNC: 109 MG/DL (ref 70–99)
HCT VFR BLD AUTO: 27.7 % (ref 40–53)
HGB BLD-MCNC: 9.5 G/DL (ref 13.3–17.7)
IMM GRANULOCYTES # BLD: 0 10E3/UL
IMM GRANULOCYTES NFR BLD: 0 %
LYMPHOCYTES # BLD AUTO: 0.4 10E3/UL (ref 0.8–5.3)
LYMPHOCYTES NFR BLD AUTO: 34 %
MAGNESIUM SERPL-MCNC: 2.4 MG/DL (ref 1.6–2.3)
MCH RBC QN AUTO: 32.9 PG (ref 26.5–33)
MCHC RBC AUTO-ENTMCNC: 34.3 G/DL (ref 31.5–36.5)
MCV RBC AUTO: 96 FL (ref 78–100)
MONOCYTES # BLD AUTO: 0.3 10E3/UL (ref 0–1.3)
MONOCYTES NFR BLD AUTO: 28 %
NEUTROPHILS # BLD AUTO: 0.4 10E3/UL (ref 1.6–8.3)
NEUTROPHILS NFR BLD AUTO: 34 %
NRBC # BLD AUTO: 0 10E3/UL
NRBC BLD AUTO-RTO: 0 /100
PLAT MORPH BLD: NORMAL
PLATELET # BLD AUTO: 88 10E3/UL (ref 150–450)
POTASSIUM BLD-SCNC: 4.1 MMOL/L (ref 3.4–5.3)
PROT SERPL-MCNC: 6.6 G/DL (ref 6.8–8.8)
PSA SERPL-MCNC: 1.21 UG/L (ref 0–4)
RBC # BLD AUTO: 2.89 10E6/UL (ref 4.4–5.9)
RBC MORPH BLD: NORMAL
SODIUM SERPL-SCNC: 142 MMOL/L (ref 133–144)
WBC # BLD AUTO: 1.2 10E3/UL (ref 4–11)

## 2022-01-25 PROCEDURE — 36591 DRAW BLOOD OFF VENOUS DEVICE: CPT

## 2022-01-25 PROCEDURE — 250N000011 HC RX IP 250 OP 636

## 2022-01-25 PROCEDURE — 85025 COMPLETE CBC W/AUTO DIFF WBC: CPT

## 2022-01-25 PROCEDURE — 80053 COMPREHEN METABOLIC PANEL: CPT

## 2022-01-25 PROCEDURE — 83735 ASSAY OF MAGNESIUM: CPT

## 2022-01-25 PROCEDURE — 99214 OFFICE O/P EST MOD 30 MIN: CPT

## 2022-01-25 PROCEDURE — 84153 ASSAY OF PSA TOTAL: CPT

## 2022-01-25 RX ORDER — HEPARIN SODIUM (PORCINE) LOCK FLUSH IV SOLN 100 UNIT/ML 100 UNIT/ML
5 SOLUTION INTRAVENOUS
Status: COMPLETED | OUTPATIENT
Start: 2022-01-25 | End: 2022-01-25

## 2022-01-25 RX ORDER — FLUCONAZOLE 100 MG/1
200 TABLET ORAL DAILY
Qty: 30 TABLET | Refills: 1 | COMMUNITY
Start: 2022-01-25 | End: 2022-02-03

## 2022-01-25 RX ADMIN — Medication 5 ML: at 08:41

## 2022-01-25 ASSESSMENT — PAIN SCALES - GENERAL: PAINLEVEL: MILD PAIN (2)

## 2022-01-25 NOTE — PROGRESS NOTES
I reviewed the discharge medications, med box and administration times with Juvenal and his care giver. He was not given a med box and did not have administration times with him. I was able to get a med box from [facility] pharmacy. I filled the med box, increased the fluconazole to 200 mg daily per shani. The levofloxacin bottle was missing but Juvenal said he is picking up a refill today and will add to the box. I answered all there questions.     Current Outpatient Medications   Medication Sig Dispense Refill     acyclovir (ZOVIRAX) 800 MG tablet Take 1 tablet (800 mg) by mouth every 12 hours 60 tablet 0     albuterol (VENTOLIN HFA) 108 (90 Base) MCG/ACT inhaler INHALE 2 PUFFS BY MOUTH FOUR TIMES DAILY AS NEEDED FOR COUGH OR CONGESTION       allopurinol (ZYLOPRIM) 300 MG tablet Take 1 tablet (300 mg) by mouth daily 30 tablet 1     beclomethasone HFA (QVAR REDIHALER) 80 MCG/ACT inhaler Inhale 1 puff into the lungs daily as needed (for reactive airway symptoms)        benzonatate (TESSALON) 100 MG capsule Take 1 capsule (100 mg) by mouth 3 times daily as needed for cough 30 capsule 0     Calcium Polycarbophil (FIBER-CAPS PO) Take 2 capsules by mouth daily       fluconazole (DIFLUCAN) 100 MG tablet Take 2 tablets (200 mg) by mouth daily 30 tablet 1     guaiFENesin-codeine (ROBITUSSIN AC) 100-10 MG/5ML solution Take 5-10 mLs by mouth every 4 hours as needed for cough 180 mL 0     levofloxacin (LEVAQUIN) 250 MG tablet Take 1 tablet (250 mg) by mouth daily 30 tablet 1     medical cannabis (Patient's own supply) 1 Dose daily as needed (The purpose of this order is to document that the patient reports taking medical cannabis)       omeprazole (PRILOSEC) 20 MG DR capsule TAKE 2 CAPSULES(40 MG) BY MOUTH DAILY 180 capsule 3     prochlorperazine (COMPAZINE) 5 MG tablet Take 1 tablet (5 mg) by mouth 4 times daily 60 tablet 1     tolterodine ER (DETROL LA) 4 MG 24 hr capsule Take 1 capsule (4 mg) by mouth daily 90 capsule 11         Pertinent labs considered today:  Lab Results   Component Value Date     01/25/2022     07/08/2021                 normal sodium 136-148  Lab Results   Component Value Date    POTASSIUM 4.1 01/25/2022    POTASSIUM 4.2 07/08/2021       normal potassium 3.5-5.2   Lab Results   Component Value Date    CHLORIDE 107 01/25/2022    CHLORIDE 107 07/08/2021           normal chloride    Lab Results   Component Value Date    CO2 27 01/25/2022    CO2 28 07/08/2021                normal CO2 20-32  Lab Results   Component Value Date    BUN 15 01/25/2022    BUN 11 07/08/2021                 normal BUN 5-24  Lab Results   Component Value Date     01/25/2022    GLC 74 09/21/2021                 normal glucose   Lab Results   Component Value Date    CR 1.09 01/25/2022    CR 1.03 07/08/2021                 normal cr 0.8-1.5  Lab Results   Component Value Date    TRE 9.2 01/25/2022    TRE 8.9 07/08/2021               normal calcium  8.5-10.4  Lab Results   Component Value Date    BILITOTAL 0.5 01/25/2022    BILITOTAL 0.6 07/08/2021          normal bilirubin 0.2-1.3  Lab Results   Component Value Date    PROTTOTAL 6.6 01/25/2022    PROTTOTAL 5.9 07/08/2021          normal total protein 6.0-8.2  Lab Results   Component Value Date    ALBUMIN 3.9 01/25/2022    ALBUMIN 2.8 07/08/2021             normal albumin 3.2-4.5  Lab Results   Component Value Date    ALKPHOS 99 01/25/2022    ALKPHOS 203 07/08/2021            normal alkphos   Lab Results   Component Value Date    ALT 46 01/25/2022    ALT 48 07/08/2021         normal ALT 0-65  Lab Results   Component Value Date    AST 21 01/25/2022    AST 24 07/08/2021         normal AST 0-37    Lab Results   Component Value Date    HGB 9.5 01/25/2022    HGB 7.6 07/08/2021     Lab Results   Component Value Date    WBC 1.2 01/25/2022    WBC 2.6 07/08/2021      Lab Results   Component Value Date    PLT 88 01/25/2022     07/08/2021       Estimated Creatinine  Clearance: 84.9 mL/min (based on SCr of 1.09 mg/dL).     Time spent in this activity: 20 minutes        Cole Adams RPH, PharmD

## 2022-01-25 NOTE — PROGRESS NOTES
BMT/Cell Therapy Daily Progress Note   01/25/2022     Patient ID:  Juvenal Blake is a 56 year old male, currently day +8 s/p Yescarta CAR-T.      Diagnosis NHLFL Non-Hodgkin lymphoma, Follicular, predominantly large cell  BMTCT Type Cellular Therapy    Prep Regimen Fludarabine, Cytoxan  Donor Source Autologous     GVHD Prophylaxis NA  Primary BMT MD Dr. Irwin    Clinical Trials   2017-45         Admission date: 1/17/2022     INTERVAL  HISTORY      Juvenal presents to clinic this am after hospital discharge yesterday. Taking meds as prescribed, he thinks, however he was not given a med box. No fevers, chills, cough or congestion. Woke up nausea, took compazine with relief. Eating, but less than 50% his normal. No vomiting. No diarrhea. PICC line removal site without bleeding. He brought his sentence log with him today. Had some levaquin at home, but just a little. Did not get discharged with it.     Review of Systems: 10 point ROS negative except as noted above.        PHYSICAL EXAM      KPS:  80  /76 (BP Location: Right arm, Patient Position: Sitting, Cuff Size: Adult Large)   Pulse 73   Temp 98.1  F (36.7  C) (Oral)   Resp 20   Wt 102.3 kg (225 lb 9.6 oz)   SpO2 99%   BMI 34.79 kg/m    Wt Readings from Last 4 Encounters:   01/25/22 102.3 kg (225 lb 9.6 oz)   01/24/22 101.4 kg (223 lb 9.6 oz)   01/14/22 108.6 kg (239 lb 6.4 oz)   01/13/22 108.2 kg (238 lb 8 oz)     General: NAD   Eyes: sclera anicteric   Nose/Mouth/Throat: OP moist, no ulcerations   Lungs: CTA bilaterally  Cardiovascular: RRR, no M/R/G   Abdominal/Rectal: +BS, soft, NT, obese, No HSM   Lymphatics: trace LE edema  Skin: No rash  Neuro: non-focal  Access: L arm PICC site removed 1/24 with surrounding patchy ecchymosis no bleeding. R chest PAC.     ICE 10/10 (1/25) sentence log now in clinic file cabinet (2E/F collab space)     LABS AND IMAGING: I have assessed all abnormal lab values for their clinical significance and any values  considered clinically significant have been addressed in the assessment and plan.         Juvenal Blake is a 56 year old male PMH significant for refractory NHL, Day +8 of Yescarta CAR-T therapy     1. Follicular Lymphoma/Yescarta CAR-T:  Day 0 Yescarta CAR-T 1/17/22  Relapsed after BR, [facility], NAM NK (9/1/20), idelalisib (~4-6/2020),  (6/28/21), polatuzumab (12/21/21). (hx Rituxan reaction).   - LD chemo with fludarabine/Cytoxan 1/12-1/14/22 prior to CAR-T infusion.    - cont allopurinol  CRP=8 1/24, next check 1/28  ICE 10/10 to date. Daily CAR-Tox note through at least D14.     2. HEME: Keep hgb >7g, plt>10.  #pancytopenia 2/2 chemo/lymphoma.      3. ID: Afebrile.  #Prophy: acyclovir, fluconazole, Levaquin-started on admission. Inhaled Pentamidine 1/13/21.    #Hx C diff colitis. Screening C.dif + on admission. Asymptomatic. If develops diarrhea, will Rx Vanco.     #hx parainfluenza 3 (12/21/21): Occasional cough, otherwise sx improved.  #hx rhinovirus (10/6/21).      #S/p 3 covid shots; first two were pre- and booster was after.  Defer to car -t team regarding if revaccination needed post CART.      4. GI:    #JACQUE: compazine prn. Ativan available also  - cont PTA omeprazole  -  hx medical cannibis.  #Abdominal Pain secondary to cancer. Minimal complaints but      5. Renal/FEN: Creat, lytes wnl.  - replete prn per sliding scale  #severe malnutrition 2/2 chronic illness/cancer (based on wt loss). Use ensure/ high calorie snacks while intake is at/below 50%     6. CV:    Cardiology cleared him to proceed with no further testing. The CT angiogram showed no lesion requiring stenting or bypass.     7. :   #hx Prostatic adenocarcinoma: s/p radical prostatectomy and bilateral lymph node dissection 11/2017. Completed radiation to the prostate fossa and pelvic lymph nodes at [facility] early May 2018. He received 4500 cGy in 25 fractions. He then had 2340 cGy boost in 13 fractions to the prostatic  fossa, for a total dose of 6240 cGy in 38 treatment fractions delivered over 54 days. He did not receive hormonal therapy:    9/10 PSA: 0.71 - no concerns.        Plan: increase fluc to 200mg/day   PharmD filled med box, he will  more levaquin today  Encouraged ensure/ice cream to supplement calories     RTC: Daily visits through day 14 for kary Stewart PAC  [phone]

## 2022-01-25 NOTE — LETTER
1/25/2022         RE: Juvenal Blake  [address] [place] [address]        Dear Colleague,    Thank you for referring your patient, Juvenal Blake, to the [facility] [place]. Please see a copy of my visit note below.    BMT/Cell Therapy Daily Progress Note   01/25/2022     Patient ID:  Juvenal Blake is a 56 year old male, currently day +8 s/p Yescarta CAR-T.      Diagnosis NHLFL Non-Hodgkin lymphoma, Follicular, predominantly large cell  BMTCT Type Cellular Therapy    Prep Regimen Fludarabine, Cytoxan  Donor Source Autologous     GVHD Prophylaxis NA  Primary BMT MD Dr. Irwin    Clinical Trials   2017-45         Admission date: 1/17/2022     INTERVAL  HISTORY      Juvenal presents to clinic this am after hospital discharge yesterday. Taking meds as prescribed, he thinks, however he was not given a med box. No fevers, chills, cough or congestion. Woke up nausea, took compazine with relief. Eating, but less than 50% his normal. No vomiting. No diarrhea. PICC line removal site without bleeding. He brought his sentence log with him today. Had some levaquin at home, but just a little. Did not get discharged with it.     Review of Systems: 10 point ROS negative except as noted above.        PHYSICAL EXAM      KPS:  80  /76 (BP Location: Right arm, Patient Position: Sitting, Cuff Size: Adult Large)   Pulse 73   Temp 98.1  F (36.7  C) (Oral)   Resp 20   Wt 102.3 kg (225 lb 9.6 oz)   SpO2 99%   BMI 34.79 kg/m    Wt Readings from Last 4 Encounters:   01/25/22 102.3 kg (225 lb 9.6 oz)   01/24/22 101.4 kg (223 lb 9.6 oz)   01/14/22 108.6 kg (239 lb 6.4 oz)   01/13/22 108.2 kg (238 lb 8 oz)     General: NAD   Eyes: sclera anicteric   Nose/Mouth/Throat: OP moist, no ulcerations   Lungs: CTA bilaterally  Cardiovascular: RRR, no M/R/G   Abdominal/Rectal: +BS, soft, NT, obese, No HSM   Lymphatics: trace LE edema  Skin: No rash  Neuro: non-focal  Access: L arm  PICC site removed 1/24 with surrounding patchy ecchymosis no bleeding. R chest PAC.     ICE 10/10 (1/25) sentence log now in clinic file cabinet (2E/F collab space)     LABS AND IMAGING: I have assessed all abnormal lab values for their clinical significance and any values considered clinically significant have been addressed in the assessment and plan.         Juvenal Blake is a 56 year old male PMH significant for refractory NHL, Day +8 of Yescarta CAR-T therapy     1. Follicular Lymphoma/Yescarta CAR-T:  Day 0 Yescarta CAR-T 1/17/22  Relapsed after BR, [facility], NAM NK (9/1/20), idelalisib (~4-6/2020),  (6/28/21), polatuzumab (12/21/21). (hx Rituxan reaction).   - LD chemo with fludarabine/Cytoxan 1/12-1/14/22 prior to CAR-T infusion.    - cont allopurinol  CRP=8 1/24, next check 1/28  ICE 10/10 to date. Daily CAR-Tox note through at least D14.     2. HEME: Keep hgb >7g, plt>10.  #pancytopenia 2/2 chemo/lymphoma.      3. ID: Afebrile.  #Prophy: acyclovir, fluconazole, Levaquin-started on admission. Inhaled Pentamidine 1/13/21.    #Hx C diff colitis. Screening C.dif + on admission. Asymptomatic. If develops diarrhea, will Rx Vanco.     #hx parainfluenza 3 (12/21/21): Occasional cough, otherwise sx improved.  #hx rhinovirus (10/6/21).      #S/p 3 covid shots; first two were pre- and booster was after.  Defer to car -t team regarding if revaccination needed post CART.      4. GI:    #JACQUE: compazine prn. Ativan available also  - cont PTA omeprazole  -  hx medical cannibis.  #Abdominal Pain secondary to cancer. Minimal complaints but      5. Renal/FEN: Creat, lytes wnl.  - replete prn per sliding scale  #severe malnutrition 2/2 chronic illness/cancer (based on wt loss). Use ensure/ high calorie snacks while intake is at/below 50%     6. CV:    Cardiology cleared him to proceed with no further testing. The CT angiogram showed no lesion requiring stenting or bypass.     7. :   #hx Prostatic adenocarcinoma:  s/p radical prostatectomy and bilateral lymph node dissection 11/2017. Completed radiation to the prostate fossa and pelvic lymph nodes at [facility] early May 2018. He received 4500 cGy in 25 fractions. He then had 2340 cGy boost in 13 fractions to the prostatic fossa, for a total dose of 6240 cGy in 38 treatment fractions delivered over 54 days. He did not receive hormonal therapy:    9/10 PSA: 0.71 - no concerns.        Plan: increase fluc to 200mg/day   PharmD filled med box, he will  more levaquin today  Encouraged ensure/ice cream to supplement calories     RTC: Daily visits through day 14 for cartox joshal    Narcisa Quirogal PAC  [phone]    BMT Daily CARTOX Note (complete days 0-14 and with any subsequent CRS/neurotoxicity)    Date: January 25, 2022    Juvenal Blake ([id number]) is a 56 year old year old male who received infusion on 1/17/2022, currently day 8 of CAR-T cell therapy Yescarta    Vital Signs: /76 (BP Location: Right arm, Patient Position: Sitting, Cuff Size: Adult Large)   Pulse 73   Temp 98.1  F (36.7  C) (Oral)   Resp 20   Wt 102.3 kg (225 lb 9.6 oz)   SpO2 99%   BMI 34.79 kg/m      1) CRS Grading (based ONLY on parameters in box below)    Fever:   </= 100.4    Blood pressure:   SBP >/= 90 (not hypotensive)    Oxygen saturation:    >/= 90% on room air (not hypoxic)    CRS Parameter Grade 1  Grade 2 Grade 3 Grade 4   Fever* Tm >= 100.4 degrees F Tm >= 100.4 degrees F Tm >= 100.4 degrees F Tm >= to 100.4  degrees F      With Either:  Hypotension (SBP <90) None Responsive to Fluids  Requiring 1  vasopressor (w/ or w/o vasopressin) Requiring multiple  vasopressors  (excluding  vasopressin)     And/or  Hypoxia (O2 sats <90% on room air) None Low-flow nasal  cannula or blow-by High-flow nasal  cannula, face mask,  non-rebreather mask,or Venturi mask  Requiring positive  pressure (CPAP,  BiPAP intubation and  mechanical  ventilation)     CRS Summary  Does patient have CRS?  No  Current CRS stgstrstastdstest:st st1st What therapy was given: n/a  Has CRS grade changed? n/a   Has CRS resolved? n/a       2) Neurotoxicity:    ICE Assessment  Orientation to year, month, city, hospital: 4 points, Name 3 objects (e.g., point to clock, pen, button): 3 point, Following commands: (e.g., Show me 2 fingers): 1 point, Write a standard sentence (e.g., The tang jumped over the log): 1 point and Count backwards from 100 by ten: 1 point  Total points: 10: No impairment    ASTCT ICANS Consensus Grading for Adults  ICE Score   10    Seizure   No seizures    Motor Findings   No motor findings    Elevated ICP/Cerebral Edema   None      Neurotoxicity  Domain Grade 1 Grade 2 Grade 3 Grade 4   ICE score 7-9 3-6 1-2 0   Depressed LOC      Awakens  spontaneously Awakens to  voice  Awakens only to  tactile stimulation Unarousable or  requires vigorous  or repetitive  tactile stimuli to  arouse. Stupor  or coma.   Seizure n/a n/a Any clinical  seizure focal or  generalized that  resolves rapidly  or nonconvulsive  seizures on EEG  that resolve with  intervention  Life-threatening  prolonged  seizure (>5 min);  or Repetitive  clinical or  electrical  seizures without  return to baseline  in between    Motor Findings      n/a n/a n/a Deep focal motor  weakness such  as hemiparesis  or paraparesis   Elevated  ICP/cerebral  edema  n/a n/a Focal/local  edema on  neuroimaging  Diffuse cerebral  edema on  neuroimaging;  decerebrate or  decorticate  posturing; or  cranial nerve VI  palsy; or  papilledema; or  Cushing's triad     ICANS grade is determined by the most severe event (ICE score, level of consciousness, seizure, motor findings, raised ICP/cerebral edema) not attributable to any other cause; for example, a patient with an ICE score of 3 who has a generalized seizure is classified as grade 3 ICANS.    A patient with an ICE score of 0 may be classified as grade 3 ICANS if awake with global aphasia, but a patient with an ICE score  of 0 may be classified as grade 4 ICANS if unarousable.     Depressed level of consciousness should be attributable to no other cause (eg, no sedating medication)    Tremors and myoclonus associated with immune effector cell therapies may be graded according to CTCAE v5.0,but they do not influence ICANS grading.     Intracranial hemorrhage with or without associated edema is not considered a neurotoxicity feature and is  excluded from ICANS grading. It may be graded according to CTCAE v5.0.          Neurotoxicity Summary  Does patient have neurotoxicity? No  Current Neurotoxicity score (0-10): none  What therapy was given: n/a  Has neurotoxicity grade changed? n/a   Has neurotoxicity resolved? n/a       3) Organ CAR-T toxicity:    Cardiac   none    Respiratory   none    Gastrointestinal   nausea    Hepatic    none    Skin   none     Coagulopathy    none     Other: n/a      4) HLH   Ferritin > 10,000 plus any TWO of the following: n/a     * CTCAE grading can be found at   https://ctep.cancer.gov/protocoldevelopment/electronic_applications/docs/CTCAE_v5_Quick Reference_8.5x11.pdf          Again, thank you for allowing me to participate in the care of your patient.        Sincerely,        BMT Advanced Practice Provider

## 2022-01-25 NOTE — LETTER
Date:January 27, 2022      Provider requested that no letter be sent. Do not send.       [facility]

## 2022-01-25 NOTE — NURSING NOTE
"Chief Complaint   Patient presents with     Port Draw     labs drawn from port by rn.  vs taken     Port accessed with 20 gauge 3/4\" Power needle and labs drawn by rn.  Port flushed with NS and heparin then de-accessed.  Pt tolerated well.  VS taken.  Pt checked in for next appt.    Lexus Byrd RN      "

## 2022-01-25 NOTE — PROGRESS NOTES
BMT Daily CARTOX Note (complete days 0-14 and with any subsequent CRS/neurotoxicity)    Date: January 25, 2022    Juvenal Blake ([id number]) is a 56 year old year old male who received infusion on 1/17/2022, currently day 8 of CAR-T cell therapy Yescarta    Vital Signs: /76 (BP Location: Right arm, Patient Position: Sitting, Cuff Size: Adult Large)   Pulse 73   Temp 98.1  F (36.7  C) (Oral)   Resp 20   Wt 102.3 kg (225 lb 9.6 oz)   SpO2 99%   BMI 34.79 kg/m      1) CRS Grading (based ONLY on parameters in box below)    Fever:   </= 100.4    Blood pressure:   SBP >/= 90 (not hypotensive)    Oxygen saturation:    >/= 90% on room air (not hypoxic)    CRS Parameter Grade 1  Grade 2 Grade 3 Grade 4   Fever* Tm >= 100.4 degrees F Tm >= 100.4 degrees F Tm >= 100.4 degrees F Tm >= to 100.4  degrees F      With Either:  Hypotension (SBP <90) None Responsive to Fluids  Requiring 1  vasopressor (w/ or w/o vasopressin) Requiring multiple  vasopressors  (excluding  vasopressin)     And/or  Hypoxia (O2 sats <90% on room air) None Low-flow nasal  cannula or blow-by High-flow nasal  cannula, face mask,  non-rebreather mask,or Venturi mask  Requiring positive  pressure (CPAP,  BiPAP intubation and  mechanical  ventilation)     CRS Summary  Does patient have CRS? No  Current CRS stgstrstastdstest:st st1st What therapy was given: n/a  Has CRS grade changed? n/a   Has CRS resolved? n/a       2) Neurotoxicity:    ICE Assessment  Orientation to year, month, city, hospital: 4 points, Name 3 objects (e.g., point to clock, pen, button): 3 point, Following commands: (e.g., Show me 2 fingers): 1 point, Write a standard sentence (e.g., The tang jumped over the log): 1 point and Count backwards from 100 by ten: 1 point  Total points: 10: No impairment    ASTCT ICANS Consensus Grading for Adults  ICE Score   10    Seizure   No seizures    Motor Findings   No motor findings    Elevated ICP/Cerebral Edema   None      Neurotoxicity  Domain Grade 1  Grade 2 Grade 3 Grade 4   ICE score 7-9 3-6 1-2 0   Depressed LOC      Awakens  spontaneously Awakens to  voice  Awakens only to  tactile stimulation Unarousable or  requires vigorous  or repetitive  tactile stimuli to  arouse. Stupor  or coma.   Seizure n/a n/a Any clinical  seizure focal or  generalized that  resolves rapidly  or nonconvulsive  seizures on EEG  that resolve with  intervention  Life-threatening  prolonged  seizure (>5 min);  or Repetitive  clinical or  electrical  seizures without  return to baseline  in between    Motor Findings      n/a n/a n/a Deep focal motor  weakness such  as hemiparesis  or paraparesis   Elevated  ICP/cerebral  edema  n/a n/a Focal/local  edema on  neuroimaging  Diffuse cerebral  edema on  neuroimaging;  decerebrate or  decorticate  posturing; or  cranial nerve VI  palsy; or  papilledema; or  Cushing's triad     ICANS grade is determined by the most severe event (ICE score, level of consciousness, seizure, motor findings, raised ICP/cerebral edema) not attributable to any other cause; for example, a patient with an ICE score of 3 who has a generalized seizure is classified as grade 3 ICANS.    A patient with an ICE score of 0 may be classified as grade 3 ICANS if awake with global aphasia, but a patient with an ICE score of 0 may be classified as grade 4 ICANS if unarousable.     Depressed level of consciousness should be attributable to no other cause (eg, no sedating medication)    Tremors and myoclonus associated with immune effector cell therapies may be graded according to CTCAE v5.0,but they do not influence ICANS grading.     Intracranial hemorrhage with or without associated edema is not considered a neurotoxicity feature and is  excluded from ICANS grading. It may be graded according to CTCAE v5.0.          Neurotoxicity Summary  Does patient have neurotoxicity? No  Current Neurotoxicity score (0-10): none  What therapy was given: n/a  Has neurotoxicity grade  changed? n/a   Has neurotoxicity resolved? n/a       3) Organ CAR-T toxicity:    Cardiac   none    Respiratory   none    Gastrointestinal   nausea    Hepatic    none    Skin   none     Coagulopathy    none     Other: n/a      4) HLH   Ferritin > 10,000 plus any TWO of the following: n/a     * CTCAE grading can be found at   https://ctep.cancer.gov/protocoldevelopment/electronic_applications/docs/CTCAE_v5_Quick Reference_8.5x11.pdf

## 2022-01-26 ENCOUNTER — ONCOLOGY VISIT (OUTPATIENT)
Dept: TRANSPLANT | Facility: CLINIC | Age: 57
End: 2022-01-26
Payer: COMMERCIAL

## 2022-01-26 ENCOUNTER — APPOINTMENT (OUTPATIENT)
Dept: LAB | Facility: CLINIC | Age: 57
End: 2022-01-26
Payer: COMMERCIAL

## 2022-01-26 VITALS
DIASTOLIC BLOOD PRESSURE: 76 MMHG | RESPIRATION RATE: 18 BRPM | SYSTOLIC BLOOD PRESSURE: 113 MMHG | HEART RATE: 71 BPM | BODY MASS INDEX: 34.88 KG/M2 | OXYGEN SATURATION: 100 % | TEMPERATURE: 98.3 F | WEIGHT: 226.2 LBS

## 2022-01-26 DIAGNOSIS — Z92.850 S/P CHIMERIC ANTIGEN RECEPTOR T-CELL THERAPY: Primary | ICD-10-CM

## 2022-01-26 DIAGNOSIS — C82.08 FOLLICULAR LYMPHOMA GRADE I, LYMPH NODES OF MULTIPLE SITES (H): ICD-10-CM

## 2022-01-26 LAB
ANION GAP SERPL CALCULATED.3IONS-SCNC: 7 MMOL/L (ref 3–14)
BASOPHILS # BLD MANUAL: 0 10E3/UL (ref 0–0.2)
BASOPHILS NFR BLD MANUAL: 1 %
BUN SERPL-MCNC: 15 MG/DL (ref 7–30)
CALCIUM SERPL-MCNC: 9 MG/DL (ref 8.5–10.1)
CHLORIDE BLD-SCNC: 105 MMOL/L (ref 94–109)
CO2 SERPL-SCNC: 28 MMOL/L (ref 20–32)
CREAT SERPL-MCNC: 1.15 MG/DL (ref 0.66–1.25)
EOSINOPHIL # BLD MANUAL: 0.1 10E3/UL (ref 0–0.7)
EOSINOPHIL NFR BLD MANUAL: 5 %
ERYTHROCYTE [DISTWIDTH] IN BLOOD BY AUTOMATED COUNT: 14.6 % (ref 10–15)
GFR SERPL CREATININE-BSD FRML MDRD: 74 ML/MIN/1.73M2
GLUCOSE BLD-MCNC: 98 MG/DL (ref 70–99)
HCT VFR BLD AUTO: 28.5 % (ref 40–53)
HGB BLD-MCNC: 9.8 G/DL (ref 13.3–17.7)
LYMPHOCYTES # BLD MANUAL: 0.4 10E3/UL (ref 0.8–5.3)
LYMPHOCYTES NFR BLD MANUAL: 31 %
MCH RBC QN AUTO: 33.1 PG (ref 26.5–33)
MCHC RBC AUTO-ENTMCNC: 34.4 G/DL (ref 31.5–36.5)
MCV RBC AUTO: 96 FL (ref 78–100)
MONOCYTES # BLD MANUAL: 0.3 10E3/UL (ref 0–1.3)
MONOCYTES NFR BLD MANUAL: 22 %
NEUTROPHILS # BLD MANUAL: 0.5 10E3/UL (ref 1.6–8.3)
NEUTROPHILS NFR BLD MANUAL: 41 %
PLAT MORPH BLD: ABNORMAL
PLATELET # BLD AUTO: 93 10E3/UL (ref 150–450)
POTASSIUM BLD-SCNC: 4 MMOL/L (ref 3.4–5.3)
RBC # BLD AUTO: 2.96 10E6/UL (ref 4.4–5.9)
RBC MORPH BLD: ABNORMAL
SODIUM SERPL-SCNC: 140 MMOL/L (ref 133–144)
WBC # BLD AUTO: 1.3 10E3/UL (ref 4–11)

## 2022-01-26 PROCEDURE — 85027 COMPLETE CBC AUTOMATED: CPT

## 2022-01-26 PROCEDURE — 80048 BASIC METABOLIC PNL TOTAL CA: CPT

## 2022-01-26 PROCEDURE — 250N000011 HC RX IP 250 OP 636

## 2022-01-26 PROCEDURE — 36591 DRAW BLOOD OFF VENOUS DEVICE: CPT

## 2022-01-26 PROCEDURE — 99213 OFFICE O/P EST LOW 20 MIN: CPT

## 2022-01-26 PROCEDURE — G0463 HOSPITAL OUTPT CLINIC VISIT: HCPCS

## 2022-01-26 RX ORDER — HEPARIN SODIUM (PORCINE) LOCK FLUSH IV SOLN 100 UNIT/ML 100 UNIT/ML
5 SOLUTION INTRAVENOUS EVERY 8 HOURS
Status: DISCONTINUED | OUTPATIENT
Start: 2022-01-26 | End: 2022-01-26 | Stop reason: HOSPADM

## 2022-01-26 RX ORDER — CEFDINIR 300 MG/1
300 CAPSULE ORAL 2 TIMES DAILY
Qty: 60 CAPSULE | Refills: 0 | Status: SHIPPED | OUTPATIENT
Start: 2022-01-26 | End: 2022-02-03

## 2022-01-26 RX ORDER — CEFPODOXIME PROXETIL 200 MG/1
200 TABLET, FILM COATED ORAL 2 TIMES DAILY
Qty: 60 TABLET | Refills: 0 | Status: SHIPPED | OUTPATIENT
Start: 2022-01-26 | End: 2022-02-03

## 2022-01-26 RX ADMIN — Medication 5 ML: at 09:21

## 2022-01-26 ASSESSMENT — PAIN SCALES - GENERAL: PAINLEVEL: MILD PAIN (2)

## 2022-01-26 NOTE — NURSING NOTE
"Oncology Rooming Note    January 26, 2022 9:27 AM   Juvenal Blake is a 56 year old male who presents for:    Chief Complaint   Patient presents with     Labs Only     Weight and VS obtained, port accessed, labs drawn, hep locked     Oncology Clinic Visit     follicular lymphoma     Initial Vitals: /76   Pulse 71   Temp 98.3  F (36.8  C)   Resp 18   Wt 102.6 kg (226 lb 3.2 oz)   SpO2 100%   BMI 34.88 kg/m   Estimated body mass index is 34.88 kg/m  as calculated from the following:    Height as of 1/17/22: 1.715 m (5' 7.52\").    Weight as of this encounter: 102.6 kg (226 lb 3.2 oz). Body surface area is 2.21 meters squared.  Mild Pain (2) Comment: Data Unavailable   No LMP for male patient.  Allergies reviewed: Yes  Medications reviewed: Yes    Medications: Medication refills not needed today.  Pharmacy name entered into [employer]: [id number] - [place], [place] - [address] AT [facility]    Clinical concerns: none       Radha Parikh CMA            "

## 2022-01-26 NOTE — PROGRESS NOTES
BMT Daily CARTOX Note (complete days 0-14 and with any subsequent CRS/neurotoxicity)    Date: January 26, 2022    Juvenal Blake ([id number]) is a 56 year old year old male who received infusion on 1/17/2022, currently day 9 of CAR-T cell therapy [facility]    Vital Signs: /76   Pulse 71   Temp 98.3  F (36.8  C)   Resp 18   Wt 102.6 kg (226 lb 3.2 oz)   SpO2 100%   BMI 34.88 kg/m      1) CRS Grading (based ONLY on parameters in box below)    Fever:   </= 100.4    Blood pressure:   SBP >/= 90 (not hypotensive)    Oxygen saturation:    >/= 90% on room air (not hypoxic)    CRS Parameter Grade 1  Grade 2 Grade 3 Grade 4   Fever* Tm >= 100.4 degrees F Tm >= 100.4 degrees F Tm >= 100.4 degrees F Tm >= to 100.4  degrees F      With Either:  Hypotension (SBP <90) None Responsive to Fluids  Requiring 1  vasopressor (w/ or w/o vasopressin) Requiring multiple  vasopressors  (excluding  vasopressin)     And/or  Hypoxia (O2 sats <90% on room air) None Low-flow nasal  cannula or blow-by High-flow nasal  cannula, face mask,  non-rebreather mask,or Venturi mask  Requiring positive  pressure (CPAP,  BiPAP intubation and  mechanical  ventilation)     CRS Summary  Does patient have CRS? No  Current CRS stgstrstastdstest:st st1st What therapy was given: n/a  Has CRS grade changed? n/a   Has CRS resolved? n/a       2) Neurotoxicity:    ICE Assessment  Orientation to year, month, city, hospital: 4 points, Name 3 objects (e.g., point to clock, pen, button): 3 point, Following commands: (e.g., Show me 2 fingers): 1 point, Write a standard sentence (e.g., The tang jumped over the log): 1 point and Count backwards from 100 by ten: 1 point  Total points: 10: No impairment    ASTCT ICANS Consensus Grading for Adults  ICE Score   10    Seizure   No seizures    Motor Findings   No motor findings    Elevated ICP/Cerebral Edema   None      Neurotoxicity  Domain Grade 1 Grade 2 Grade 3 Grade 4   ICE score 7-9 3-6 1-2 0   Depressed LOC       Awakens  spontaneously Awakens to  voice  Awakens only to  tactile stimulation Unarousable or  requires vigorous  or repetitive  tactile stimuli to  arouse. Stupor  or coma.   Seizure n/a n/a Any clinical  seizure focal or  generalized that  resolves rapidly  or nonconvulsive  seizures on EEG  that resolve with  intervention  Life-threatening  prolonged  seizure (>5 min);  or Repetitive  clinical or  electrical  seizures without  return to baseline  in between    Motor Findings      n/a n/a n/a Deep focal motor  weakness such  as hemiparesis  or paraparesis   Elevated  ICP/cerebral  edema  n/a n/a Focal/local  edema on  neuroimaging  Diffuse cerebral  edema on  neuroimaging;  decerebrate or  decorticate  posturing; or  cranial nerve VI  palsy; or  papilledema; or  Cushing's triad     ICANS grade is determined by the most severe event (ICE score, level of consciousness, seizure, motor findings, raised ICP/cerebral edema) not attributable to any other cause; for example, a patient with an ICE score of 3 who has a generalized seizure is classified as grade 3 ICANS.    A patient with an ICE score of 0 may be classified as grade 3 ICANS if awake with global aphasia, but a patient with an ICE score of 0 may be classified as grade 4 ICANS if unarousable.     Depressed level of consciousness should be attributable to no other cause (eg, no sedating medication)    Tremors and myoclonus associated with immune effector cell therapies may be graded according to CTCAE v5.0,but they do not influence ICANS grading.     Intracranial hemorrhage with or without associated edema is not considered a neurotoxicity feature and is  excluded from ICANS grading. It may be graded according to CTCAE v5.0.          Neurotoxicity Summary  Does patient have neurotoxicity? No  Current Neurotoxicity score (0-10): none  What therapy was given: n/a  Has neurotoxicity grade changed? n/a   Has neurotoxicity resolved? n/a       3) Organ CAR-T  toxicity:    Cardiac   none    Respiratory   none    Gastrointestinal   nausea    Hepatic    none    Skin   none     Coagulopathy    none     Other: n/a      4) HLH   Ferritin > 10,000 plus any TWO of the following: n/a     * CTCAE grading can be found at   https://ctep.cancer.gov/protocoldevelopment/electronic_applications/docs/CTCAE_v5_Quick Reference_8.5x11.pdf

## 2022-01-26 NOTE — LETTER
1/26/2022         RE: Juvenal Blake  [address] [place] [address]        Dear Colleague,    Thank you for referring your patient, Juvenal Blake, to the [facility] BLOOD AND MARROW TRANSPLANT PROGRAM [place]. Please see a copy of my visit note below.    BMT/Cell Therapy Daily Progress Note      Patient ID:  Juvenal Blake is a 56 year old male, currently day +9 s/p [facility] CAR-T.      Diagnosis NHLFL Non-Hodgkin lymphoma, Follicular, predominantly large cell  BMTCT Type Cellular Therapy    Prep Regimen Fludarabine, Cytoxan  Donor Source Autologous     GVHD Prophylaxis NA  Primary BMT MD Dr. Irwin    Clinical Trials   2017-45         Admission date: 1/17/2022     INTERVAL  HISTORY      Returns for daily follow up. Was previously having headaches but these have been gone for the last 2 days. Continues to have his chronic abdominal discomfort that is worse with activity and bumps in the road. Mild nausea but managed with prns. Still eating. No diarrhea. No infectious symptoms or bleeding issues     Review of Systems: 8 point ROS negative except as noted above.        PHYSICAL EXAM      KPS:  80  /76   Pulse 71   Temp 98.3  F (36.8  C)   Resp 18   Wt 102.6 kg (226 lb 3.2 oz)   SpO2 100%   BMI 34.88 kg/m       Wt Readings from Last 4 Encounters:   01/26/22 102.6 kg (226 lb 3.2 oz)   01/25/22 102.3 kg (225 lb 9.6 oz)   01/24/22 101.4 kg (223 lb 9.6 oz)   01/14/22 108.6 kg (239 lb 6.4 oz)     General: NAD   Eyes: sclera anicteric   Nose/Mouth/Throat: OP moist, no ulcerations   Lungs: CTA bilaterally  Cardiovascular: RRR, no M/R/G   Abdominal/Rectal: +BS, soft, chronic diffuse tenderness to palpation without rebound or guarding.    Lymphatics: trace LE edema  Skin: No rash  Neuro: non-focal  Access: L arm PICC site removed 1/24 with surrounding patchy ecchymosis no bleeding. R chest PAC.     ICE 10/10 (1/26) sentence log now in clinic file cabinet (2E/F collab space)     LABS AND  IMAGING: I have assessed all abnormal lab values for their clinical significance and any values considered clinically significant have been addressed in the assessment and plan.         Juvenal Blake is a 56 year old male PMH significant for refractory NHL, Day +9 of [facility] CAR-T therapy     1. Follicular Lymphoma/[facility] CAR-T:  Day 0 [facility] CAR-T 1/17/22  Relapsed after BR, [facility], NAM NK (9/1/20), idelalisib (~4-6/2020),  (6/28/21), polatuzumab (12/21/21). (hx Rituxan reaction).   - LD chemo with fludarabine/Cytoxan 1/12-1/14/22 prior to CAR-T infusion.    - cont allopurinol  CRP=8 1/24, next check 1/28  ICE 10/10 to date. Daily CAR-Tox note through at least D14.     2. HEME: Keep hgb >7g, plt>10.  #pancytopenia 2/2 chemo/lymphoma. Not requiring transfusions.      3. ID: Afebrile.  #Prophy: acyclovir, fluconazole, Levaquin. Inhaled Pentamidine 1/13/21.  Stop levaquin and start cefdinir BID (cefpodoxime not covered) d/t tendon soreness which he has had this previously with levaquin. Sent script for pt to  tomorrow.   #Hx C diff colitis. Screening C.dif + on admission. Asymptomatic. If develops diarrhea, will Rx Vanco.     #hx parainfluenza 3 (12/21/21): Occasional cough, otherwise sx improved.  #hx rhinovirus (10/6/21).      #S/p 3 covid shots; first two were pre- and booster was after.  Defer to car -t team regarding if revaccination needed post CART.      4. GI:    #JACQUE: compazine prn. Ativan available also  - cont PTA omeprazole  -  hx medical cannibis.  #Abdominal Pain secondary to cancer. Minimal complaints but      5. Renal/FEN: Creat, lytes wnl.  - replete prn per sliding scale  #severe malnutrition 2/2 chronic illness/cancer (based on wt loss). Use ensure/ high calorie snacks while intake is at/below 50%     6. CV:    Cardiology cleared him to proceed with no further testing. The CT angiogram showed no lesion requiring stenting or bypass.     7. :   #hx Prostatic adenocarcinoma:  s/p radical prostatectomy and bilateral lymph node dissection 11/2017. Completed radiation to the prostate fossa and pelvic lymph nodes at [facility] early May 2018. He received 4500 cGy in 25 fractions. He then had 2340 cGy boost in 13 fractions to the prostatic fossa, for a total dose of 6240 cGy in 38 treatment fractions delivered over 54 days. He did not receive hormonal therapy:    9/10 PSA: 0.71 - no concerns.   - Repeat PSA drawn on 1/25 per request of urology       RTC: Daily visits through day 14 for cartox eval    25 minutes spent on the date of the encounter doing chart review, history and exam, documentation and further activities per the note      Topher Haro PA-C  [phone]    BMT Daily CARTOX Note (complete days 0-14 and with any subsequent CRS/neurotoxicity)    Date: January 26, 2022    Juvenal Blake ([id number]) is a 56 year old year old male who received infusion on 1/17/2022, currently day 9 of CAR-T cell therapy [facility]    Vital Signs: /76   Pulse 71   Temp 98.3  F (36.8  C)   Resp 18   Wt 102.6 kg (226 lb 3.2 oz)   SpO2 100%   BMI 34.88 kg/m      1) CRS Grading (based ONLY on parameters in box below)    Fever:   </= 100.4    Blood pressure:   SBP >/= 90 (not hypotensive)    Oxygen saturation:    >/= 90% on room air (not hypoxic)    CRS Parameter Grade 1  Grade 2 Grade 3 Grade 4   Fever* Tm >= 100.4 degrees F Tm >= 100.4 degrees F Tm >= 100.4 degrees F Tm >= to 100.4  degrees F      With Either:  Hypotension (SBP <90) None Responsive to Fluids  Requiring 1  vasopressor (w/ or w/o vasopressin) Requiring multiple  vasopressors  (excluding  vasopressin)     And/or  Hypoxia (O2 sats <90% on room air) None Low-flow nasal  cannula or blow-by High-flow nasal  cannula, face mask,  non-rebreather mask,or Venturi mask  Requiring positive  pressure (CPAP,  BiPAP intubation and  mechanical  ventilation)     CRS Summary  Does patient have CRS? No  Current CRS stgstrstastdstest:st st1st What therapy was  given: n/a  Has CRS grade changed? n/a   Has CRS resolved? n/a       2) Neurotoxicity:    ICE Assessment  Orientation to year, month, city, hospital: 4 points, Name 3 objects (e.g., point to clock, pen, button): 3 point, Following commands: (e.g., Show me 2 fingers): 1 point, Write a standard sentence (e.g., The tang jumped over the log): 1 point and Count backwards from 100 by ten: 1 point  Total points: 10: No impairment    ASTCT ICANS Consensus Grading for Adults  ICE Score   10    Seizure   No seizures    Motor Findings   No motor findings    Elevated ICP/Cerebral Edema   None      Neurotoxicity  Domain Grade 1 Grade 2 Grade 3 Grade 4   ICE score 7-9 3-6 1-2 0   Depressed LOC      Awakens  spontaneously Awakens to  voice  Awakens only to  tactile stimulation Unarousable or  requires vigorous  or repetitive  tactile stimuli to  arouse. Stupor  or coma.   Seizure n/a n/a Any clinical  seizure focal or  generalized that  resolves rapidly  or nonconvulsive  seizures on EEG  that resolve with  intervention  Life-threatening  prolonged  seizure (>5 min);  or Repetitive  clinical or  electrical  seizures without  return to baseline  in between    Motor Findings      n/a n/a n/a Deep focal motor  weakness such  as hemiparesis  or paraparesis   Elevated  ICP/cerebral  edema  n/a n/a Focal/local  edema on  neuroimaging  Diffuse cerebral  edema on  neuroimaging;  decerebrate or  decorticate  posturing; or  cranial nerve VI  palsy; or  papilledema; or  Cushing's triad     ICANS grade is determined by the most severe event (ICE score, level of consciousness, seizure, motor findings, raised ICP/cerebral edema) not attributable to any other cause; for example, a patient with an ICE score of 3 who has a generalized seizure is classified as grade 3 ICANS.    A patient with an ICE score of 0 may be classified as grade 3 ICANS if awake with global aphasia, but a patient with an ICE score of 0 may be classified as grade 4 ICANS if  unarousable.     Depressed level of consciousness should be attributable to no other cause (eg, no sedating medication)    Tremors and myoclonus associated with immune effector cell therapies may be graded according to CTCAE v5.0,but they do not influence ICANS grading.     Intracranial hemorrhage with or without associated edema is not considered a neurotoxicity feature and is  excluded from ICANS grading. It may be graded according to CTCAE v5.0.          Neurotoxicity Summary  Does patient have neurotoxicity? No  Current Neurotoxicity score (0-10): none  What therapy was given: n/a  Has neurotoxicity grade changed? n/a   Has neurotoxicity resolved? n/a       3) Organ CAR-T toxicity:    Cardiac   none    Respiratory   none    Gastrointestinal   nausea    Hepatic    none    Skin   none     Coagulopathy    none     Other: n/a      4) HLH   Ferritin > 10,000 plus any TWO of the following: n/a     * CTCAE grading can be found at   https://ctep.cancer.gov/protocoldevelopment/electronic_applications/docs/CTCAE_v5_Quick Reference_8.5x11.pdf        Again, thank you for allowing me to participate in the care of your patient.      Sincerely,    BMT Advanced Practice Provider

## 2022-01-26 NOTE — PROGRESS NOTES
BMT/Cell Therapy Daily Progress Note      Patient ID:  Juvenal Blake is a 56 year old male, currently day +9 s/p [facility] CAR-T.      Diagnosis NHLFL Non-Hodgkin lymphoma, Follicular, predominantly large cell  BMTCT Type Cellular Therapy    Prep Regimen Fludarabine, Cytoxan  Donor Source Autologous     GVHD Prophylaxis NA  Primary BMT MD Dr. Irwin    Clinical Trials   2017-45         Admission date: 1/17/2022     INTERVAL  HISTORY      Returns for daily follow up. Was previously having headaches but these have been gone for the last 2 days. Continues to have his chronic abdominal discomfort that is worse with activity and bumps in the road. Mild nausea but managed with prns. Still eating. No diarrhea. No infectious symptoms or bleeding issues     Review of Systems: 8 point ROS negative except as noted above.        PHYSICAL EXAM      KPS:  80  /76   Pulse 71   Temp 98.3  F (36.8  C)   Resp 18   Wt 102.6 kg (226 lb 3.2 oz)   SpO2 100%   BMI 34.88 kg/m       Wt Readings from Last 4 Encounters:   01/26/22 102.6 kg (226 lb 3.2 oz)   01/25/22 102.3 kg (225 lb 9.6 oz)   01/24/22 101.4 kg (223 lb 9.6 oz)   01/14/22 108.6 kg (239 lb 6.4 oz)     General: NAD   Eyes: sclera anicteric   Nose/Mouth/Throat: OP moist, no ulcerations   Lungs: CTA bilaterally  Cardiovascular: RRR, no M/R/G   Abdominal/Rectal: +BS, soft, chronic diffuse tenderness to palpation without rebound or guarding.    Lymphatics: trace LE edema  Skin: No rash  Neuro: non-focal  Access: L arm PICC site removed 1/24 with surrounding patchy ecchymosis no bleeding. R chest PAC.     ICE 10/10 (1/26) sentence log now in clinic file cabinet (2E/F collab space)     LABS AND IMAGING: I have assessed all abnormal lab values for their clinical significance and any values considered clinically significant have been addressed in the assessment and plan.         Juvenal Blake is a 56 year old male PMH significant for refractory NHL, Day +9 of [facility]  CAR-T therapy     1. Follicular Lymphoma/[facility] CAR-T:  Day 0 [facility] CAR-T 1/17/22  Relapsed after BR, [facility], NAM NK (9/1/20), idelalisib (~4-6/2020),  (6/28/21), polatuzumab (12/21/21). (hx Rituxan reaction).   - LD chemo with fludarabine/Cytoxan 1/12-1/14/22 prior to CAR-T infusion.    - cont allopurinol  CRP=8 1/24, next check 1/28  ICE 10/10 to date. Daily CAR-Tox note through at least D14.     2. HEME: Keep hgb >7g, plt>10.  #pancytopenia 2/2 chemo/lymphoma. Not requiring transfusions.      3. ID: Afebrile.  #Prophy: acyclovir, fluconazole, Levaquin. Inhaled Pentamidine 1/13/21.  Stop levaquin and start cefdinir BID (cefpodoxime not covered) d/t tendon soreness which he has had this previously with levaquin. Sent script for pt to  tomorrow.   #Hx C diff colitis. Screening C.dif + on admission. Asymptomatic. If develops diarrhea, will Rx Vanco.     #hx parainfluenza 3 (12/21/21): Occasional cough, otherwise sx improved.  #hx rhinovirus (10/6/21).      #S/p 3 covid shots; first two were pre- and booster was after.  Defer to car -t team regarding if revaccination needed post CART.      4. GI:    #JACQUE: compazine prn. Ativan available also  - cont PTA omeprazole  -  hx medical cannibis.  #Abdominal Pain secondary to cancer. Minimal complaints but      5. Renal/FEN: Creat, lytes wnl.  - replete prn per sliding scale  #severe malnutrition 2/2 chronic illness/cancer (based on wt loss). Use ensure/ high calorie snacks while intake is at/below 50%     6. CV:    Cardiology cleared him to proceed with no further testing. The CT angiogram showed no lesion requiring stenting or bypass.     7. :   #hx Prostatic adenocarcinoma: s/p radical prostatectomy and bilateral lymph node dissection 11/2017. Completed radiation to the prostate fossa and pelvic lymph nodes at [facility] early May 2018. He received 4500 cGy in 25 fractions. He then had 2340 cGy boost in 13 fractions to the prostatic fossa,  for a total dose of 6240 cGy in 38 treatment fractions delivered over 54 days. He did not receive hormonal therapy:    9/10 PSA: 0.71 - no concerns.   - Repeat PSA drawn on 1/25 per request of urology       RTC: Daily visits through day 14 for kary bains    25 minutes spent on the date of the encounter doing chart review, history and exam, documentation and further activities per the note      Topher Haro PA-C  [phone]

## 2022-01-26 NOTE — NURSING NOTE
Chief Complaint   Patient presents with     Labs Only     Weight and VS obtained, port accessed, labs drawn, hep locked     Apryl Vallejo RN

## 2022-01-27 ENCOUNTER — APPOINTMENT (OUTPATIENT)
Dept: LAB | Facility: CLINIC | Age: 57
End: 2022-01-27
Payer: COMMERCIAL

## 2022-01-27 ENCOUNTER — ONCOLOGY VISIT (OUTPATIENT)
Dept: TRANSPLANT | Facility: CLINIC | Age: 57
End: 2022-01-27
Payer: COMMERCIAL

## 2022-01-27 VITALS
WEIGHT: 227.3 LBS | HEART RATE: 67 BPM | OXYGEN SATURATION: 98 % | DIASTOLIC BLOOD PRESSURE: 75 MMHG | TEMPERATURE: 98.1 F | BODY MASS INDEX: 35.05 KG/M2 | SYSTOLIC BLOOD PRESSURE: 123 MMHG | RESPIRATION RATE: 20 BRPM

## 2022-01-27 DIAGNOSIS — C82.08 FOLLICULAR LYMPHOMA GRADE I, LYMPH NODES OF MULTIPLE SITES (H): ICD-10-CM

## 2022-01-27 DIAGNOSIS — Z92.850 S/P CHIMERIC ANTIGEN RECEPTOR T-CELL THERAPY: ICD-10-CM

## 2022-01-27 LAB
ANION GAP SERPL CALCULATED.3IONS-SCNC: 6 MMOL/L (ref 3–14)
BASOPHILS # BLD MANUAL: 0 10E3/UL (ref 0–0.2)
BASOPHILS NFR BLD MANUAL: 3 %
BUN SERPL-MCNC: 17 MG/DL (ref 7–30)
CALCIUM SERPL-MCNC: 8.8 MG/DL (ref 8.5–10.1)
CHLORIDE BLD-SCNC: 105 MMOL/L (ref 94–109)
CO2 SERPL-SCNC: 28 MMOL/L (ref 20–32)
CREAT SERPL-MCNC: 1.1 MG/DL (ref 0.66–1.25)
EOSINOPHIL # BLD MANUAL: 0.1 10E3/UL (ref 0–0.7)
EOSINOPHIL NFR BLD MANUAL: 10 %
ERYTHROCYTE [DISTWIDTH] IN BLOOD BY AUTOMATED COUNT: 14.7 % (ref 10–15)
GFR SERPL CREATININE-BSD FRML MDRD: 78 ML/MIN/1.73M2
GLUCOSE BLD-MCNC: 111 MG/DL (ref 70–99)
HCT VFR BLD AUTO: 27.3 % (ref 40–53)
HGB BLD-MCNC: 9.4 G/DL (ref 13.3–17.7)
LYMPHOCYTES # BLD MANUAL: 0.4 10E3/UL (ref 0.8–5.3)
LYMPHOCYTES NFR BLD MANUAL: 38 %
MCH RBC QN AUTO: 32.8 PG (ref 26.5–33)
MCHC RBC AUTO-ENTMCNC: 34.4 G/DL (ref 31.5–36.5)
MCV RBC AUTO: 95 FL (ref 78–100)
MONOCYTES # BLD MANUAL: 0.4 10E3/UL (ref 0–1.3)
MONOCYTES NFR BLD MANUAL: 32 %
NEUTROPHILS # BLD MANUAL: 0.2 10E3/UL (ref 1.6–8.3)
NEUTROPHILS NFR BLD MANUAL: 17 %
PLAT MORPH BLD: ABNORMAL
PLATELET # BLD AUTO: 97 10E3/UL (ref 150–450)
POTASSIUM BLD-SCNC: 4.1 MMOL/L (ref 3.4–5.3)
RBC # BLD AUTO: 2.87 10E6/UL (ref 4.4–5.9)
RBC MORPH BLD: ABNORMAL
SODIUM SERPL-SCNC: 139 MMOL/L (ref 133–144)
WBC # BLD AUTO: 1.1 10E3/UL (ref 4–11)

## 2022-01-27 PROCEDURE — 99214 OFFICE O/P EST MOD 30 MIN: CPT

## 2022-01-27 PROCEDURE — 36591 DRAW BLOOD OFF VENOUS DEVICE: CPT

## 2022-01-27 PROCEDURE — 80048 BASIC METABOLIC PNL TOTAL CA: CPT

## 2022-01-27 PROCEDURE — 250N000011 HC RX IP 250 OP 636

## 2022-01-27 PROCEDURE — 85014 HEMATOCRIT: CPT

## 2022-01-27 PROCEDURE — G0463 HOSPITAL OUTPT CLINIC VISIT: HCPCS

## 2022-01-27 RX ORDER — HEPARIN SODIUM (PORCINE) LOCK FLUSH IV SOLN 100 UNIT/ML 100 UNIT/ML
5 SOLUTION INTRAVENOUS
Status: COMPLETED | OUTPATIENT
Start: 2022-01-27 | End: 2022-01-27

## 2022-01-27 RX ADMIN — Medication 5 ML: at 09:55

## 2022-01-27 ASSESSMENT — PAIN SCALES - GENERAL: PAINLEVEL: NO PAIN (1)

## 2022-01-27 NOTE — LETTER
1/27/2022         RE: Juvenal Blake  [address] [place] [address]        Dear Colleague,    Thank you for referring your patient, Juvenal Blake, to the [facility] BLOOD AND MARROW TRANSPLANT PROGRAM [place]. Please see a copy of my visit note below.    BMT/Cell Therapy Daily Progress Note      Patient ID:  Juvenal Blake is a 56 year old male, currently day +10 s/p [facility] CAR-T.      Diagnosis NHLFL Non-Hodgkin lymphoma, Follicular, predominantly large cell  BMTCT Type Cellular Therapy    Prep Regimen Fludarabine, Cytoxan  Donor Source Autologous     GVHD Prophylaxis NA  Primary BMT MD Dr. Irwin    Clinical Trials   2017-45         Admission date: 1/17/2022     INTERVAL  HISTORY      Returns for daily follow up. Stable complaints of mild belly pain, not worse. Occasional headaches, yesterday was improved. No visual changes when he has a headache. Some days takes multiple tylenol for this. Took one tylenol with relief yesterday.   No n/v. Two stools yesterday, second stool was loose. Eating and drinking well.  Did not yet  cefdinir, plans to get today.     Review of Systems: 8 point ROS negative except as noted above.        PHYSICAL EXAM      KPS:  80  /75 (BP Location: Right arm, Patient Position: Sitting, Cuff Size: Adult Large)   Pulse 67   Temp 98.1  F (36.7  C) (Oral)   Resp 20   Wt 103.1 kg (227 lb 4.8 oz)   SpO2 98%   BMI 35.05 kg/m       Wt Readings from Last 4 Encounters:   01/26/22 102.6 kg (226 lb 3.2 oz)   01/25/22 102.3 kg (225 lb 9.6 oz)   01/24/22 101.4 kg (223 lb 9.6 oz)   01/14/22 108.6 kg (239 lb 6.4 oz)     General: NAD   Eyes: sclera anicteric   Nose/Mouth/Throat: OP moist, no ulcerations   Lungs: CTA bilaterally  Cardiovascular: RRR, no M/R/G   Abdominal/Rectal: +BS, soft, chronic diffuse tenderness to palpation without rebound or guarding.    Lymphatics: trace LE edema  Skin: No rash  Neuro: non-focal  Access: L arm PICC site removed 1/24 with  surrounding patchy ecchymosis no bleeding. R chest PAC.     ICE 10/10 1/27 sentence log now in clinic file cabinet (2E/F collab space)     LABS AND IMAGING: I have assessed all abnormal lab values for their clinical significance and any values considered clinically significant have been addressed in the assessment and plan.         Juvenal Blake is a 56 year old male PMH significant for refractory NHL, Day +10 of [facility] CAR-T therapy     1. Follicular Lymphoma/[facility] CAR-T:  Day 0 [facility] CAR-T 1/17/22  Relapsed after BR, [facility], NAM NK (9/1/20), idelalisib (~4-6/2020),  (6/28/21), polatuzumab (12/21/21). (hx Rituxan reaction).   - LD chemo with fludarabine/Cytoxan 1/12-1/14/22 prior to CAR-T infusion.    - cont allopurinol  CRP=8 1/24, next check 1/28  ICE 10/10 to date. Daily CAR-Tox note through at least D14.     2. HEME: Keep hgb >7g, plt>10.  #pancytopenia 2/2 chemo/lymphoma. Not requiring transfusions.      3. ID: Afebrile.  #Prophy: acyclovir, fluconazole, Levaquin. Inhaled Pentamidine 1/13/21. 1/26 Stopped levaquin and sent cefdinir BID (cefpodoxime not covered) d/t tendon soreness which he has had this previously with levaquin. 1/27 He will  today   #Hx C diff colitis. Screening C.dif + on admission. Asymptomatic. If develops diarrhea, will Rx Vanco.     #hx parainfluenza 3 (12/21/21): Occasional cough, otherwise sx improved.  #hx rhinovirus (10/6/21).      #S/p 3 covid shots; first two were pre- and booster was after.  Defer to car -t team regarding if revaccination needed post CART.      4. GI:    #JACQUE: compazine prn. Ativan available also  - cont PTA omeprazole  -  hx medical cannibis.  #Abdominal Pain secondary to cancer. Minimal complaints but      5. Renal/FEN: Creat, lytes wnl.  - replete prn per sliding scale  #severe malnutrition 2/2 chronic illness/cancer (based on wt loss). Use ensure/ high calorie snacks while intake is at/below 50%     6. CV:    Cardiology cleared him to  proceed with no further testing. The CT angiogram showed no lesion requiring stenting or bypass.     7. :   #hx Prostatic adenocarcinoma: s/p radical prostatectomy and bilateral lymph node dissection 11/2017. Completed radiation to the prostate fossa and pelvic lymph nodes at [facility] early May 2018. He received 4500 cGy in 25 fractions. He then had 2340 cGy boost in 13 fractions to the prostatic fossa, for a total dose of 6240 cGy in 38 treatment fractions delivered over 54 days. He did not receive hormonal therapy:    9/10 PSA: 0.71 - no concerns.   - Repeat PSA drawn on 1/25 per request of urology         RTC: Daily visits through day 14 for cartox eval  Requested he ask pharmacy to page if cefdinir cannot be picked up today.    30 minutes spent on the date of the encounter doing chart review, history and exam, documentation and further activities per the note    Narcisa Stewart PAC  [phone]        Again, thank you for allowing me to participate in the care of your patient.      Sincerely,    BMT Advanced Practice Provider

## 2022-01-27 NOTE — NURSING NOTE
"Chief Complaint   Patient presents with     Port Draw     labs drawn from port by rn.  vs taken     Port accessed with 20 gauge 3/4\" gripper needle and labs drawn by rn.  Port flushed with NS and heparin then de-accessed.  Pt tolerated well.  VS taken.  Pt checked in for next appt.    Lexus Byrd RN      "

## 2022-01-27 NOTE — PROGRESS NOTES
BMT/Cell Therapy Daily Progress Note      Patient ID:  Juvenal Blake is a 56 year old male, currently day +10 s/p [facility]a CAR-T.      Diagnosis NHLFL Non-Hodgkin lymphoma, Follicular, predominantly large cell  BMTCT Type Cellular Therapy    Prep Regimen Fludarabine, Cytoxan  Donor Source Autologous     GVHD Prophylaxis NA  Primary BMT MD Dr. Irwin    Clinical Trials   2017-45         Admission date: 1/17/2022     INTERVAL  HISTORY      Returns for daily follow up. Stable complaints of mild belly pain, not worse. Occasional headaches, yesterday was improved. No visual changes when he has a headache. Some days takes multiple tylenol for this. Took one tylenol with relief yesterday.   No n/v. Two stools yesterday, second stool was loose. Eating and drinking well.  Did not yet  cefdinir, plans to get today.     Review of Systems: 8 point ROS negative except as noted above.        PHYSICAL EXAM      KPS:  80  /75 (BP Location: Right arm, Patient Position: Sitting, Cuff Size: Adult Large)   Pulse 67   Temp 98.1  F (36.7  C) (Oral)   Resp 20   Wt 103.1 kg (227 lb 4.8 oz)   SpO2 98%   BMI 35.05 kg/m       Wt Readings from Last 4 Encounters:   01/26/22 102.6 kg (226 lb 3.2 oz)   01/25/22 102.3 kg (225 lb 9.6 oz)   01/24/22 101.4 kg (223 lb 9.6 oz)   01/14/22 108.6 kg (239 lb 6.4 oz)     General: NAD   Eyes: sclera anicteric   Nose/Mouth/Throat: OP moist, no ulcerations   Lungs: CTA bilaterally  Cardiovascular: RRR, no M/R/G   Abdominal/Rectal: +BS, soft, chronic diffuse tenderness to palpation without rebound or guarding.    Lymphatics: trace LE edema  Skin: No rash  Neuro: non-focal  Access: L arm PICC site removed 1/24 with surrounding patchy ecchymosis no bleeding. R chest PAC.     ICE 10/10 1/27 sentence log now in clinic file cabinet (2E/F collab space)     LABS AND IMAGING: I have assessed all abnormal lab values for their clinical significance and any values considered clinically significant  have been addressed in the assessment and plan.         Juvenal Blake is a 56 year old male PMH significant for refractory NHL, Day +10 of Yescarta CAR-T therapy     1. Follicular Lymphoma/Yescarta CAR-T:  Day 0 Yes[facility]a CAR-T 1/17/22  Relapsed after BR, [facility], NAM NK (9/1/20), idelalisib (~4-6/2020),  (6/28/21), polatuzumab (12/21/21). (hx Rituxan reaction).   - LD chemo with fludarabine/Cytoxan 1/12-1/14/22 prior to CAR-T infusion.    - cont allopurinol  CRP=8 1/24, next check 1/28  ICE 10/10 to date. Daily CAR-Tox note through at least D14.     2. HEME: Keep hgb >7g, plt>10.  #pancytopenia 2/2 chemo/lymphoma. Not requiring transfusions.      3. ID: Afebrile.  #Prophy: acyclovir, fluconazole, Levaquin. Inhaled Pentamidine 1/13/21. 1/26 Stopped levaquin and sent cefdinir BID (cefpodoxime not covered) d/t tendon soreness which he has had this previously with levaquin. 1/27 He will  today   #Hx C diff colitis. Screening C.dif + on admission. Asymptomatic. If develops diarrhea, will Rx Vanco.     #hx parainfluenza 3 (12/21/21): Occasional cough, otherwise sx improved.  #hx rhinovirus (10/6/21).      #S/p 3 covid shots; first two were pre- and booster was after.  Defer to car -t team regarding if revaccination needed post CART.      4. GI:    #JACQUE: compazine prn. Ativan available also  - cont PTA omeprazole  -  hx medical cannibis.  #Abdominal Pain secondary to cancer. Minimal complaints but      5. Renal/FEN: Creat, lytes wnl.  - replete prn per sliding scale  #severe malnutrition 2/2 chronic illness/cancer (based on wt loss). Use ensure/ high calorie snacks while intake is at/below 50%     6. CV:    Cardiology cleared him to proceed with no further testing. The CT angiogram showed no lesion requiring stenting or bypass.     7. :   #hx Prostatic adenocarcinoma: s/p radical prostatectomy and bilateral lymph node dissection 11/2017. Completed radiation to the prostate fossa and pelvic lymph nodes at  [facility] early May 2018. He received 4500 cGy in 25 fractions. He then had 2340 cGy boost in 13 fractions to the prostatic fossa, for a total dose of 6240 cGy in 38 treatment fractions delivered over 54 days. He did not receive hormonal therapy:    9/10 PSA: 0.71 - no concerns.   - Repeat PSA drawn on 1/25 per request of urology         RTC: Daily visits through day 14 for cartox eval  Requested he ask pharmacy to page if cefdinir cannot be picked up today.    30 minutes spent on the date of the encounter doing chart review, history and exam, documentation and further activities per the note      Narcisa Stewart PAC  [phone]

## 2022-01-27 NOTE — NURSING NOTE
"Oncology Rooming Note    January 27, 2022 10:17 AM   Juvenal Blake is a 56 year old male who presents for:    Chief Complaint   Patient presents with     Port Draw     labs drawn from port by rn.  vs taken     Initial Vitals: Blood Pressure 123/75 (BP Location: Right arm, Patient Position: Sitting, Cuff Size: Adult Large)   Pulse 67   Temperature 98.1  F (36.7  C) (Oral)   Respiration 20   Weight 103.1 kg (227 lb 4.8 oz)   Oxygen Saturation 98%   Body Mass Index 35.05 kg/m   Estimated body mass index is 35.05 kg/m  as calculated from the following:    Height as of 1/17/22: 1.715 m (5' 7.52\").    Weight as of this encounter: 103.1 kg (227 lb 4.8 oz). Body surface area is 2.22 meters squared.  No Pain (1) Comment: Data Unavailable   No LMP for male patient.  Allergies reviewed: Yes  Medications reviewed: Yes    Medications: Medication refills not needed today.  Pharmacy name entered into [employer]: [facility] #[id number] - [place], [place] - [address] AT [facility]    Clinical concerns: none       Maricelvania Clayton MA            "

## 2022-01-28 ENCOUNTER — APPOINTMENT (OUTPATIENT)
Dept: LAB | Facility: CLINIC | Age: 57
End: 2022-01-28
Payer: COMMERCIAL

## 2022-01-28 ENCOUNTER — ONCOLOGY VISIT (OUTPATIENT)
Dept: TRANSPLANT | Facility: CLINIC | Age: 57
End: 2022-01-28
Payer: COMMERCIAL

## 2022-01-28 VITALS
TEMPERATURE: 49.1 F | DIASTOLIC BLOOD PRESSURE: 72 MMHG | OXYGEN SATURATION: 100 % | HEART RATE: 68 BPM | BODY MASS INDEX: 34.85 KG/M2 | WEIGHT: 226 LBS | SYSTOLIC BLOOD PRESSURE: 118 MMHG | RESPIRATION RATE: 16 BRPM

## 2022-01-28 DIAGNOSIS — Z85.46 HISTORY OF PROSTATE CANCER: ICD-10-CM

## 2022-01-28 DIAGNOSIS — C82.08 FOLLICULAR LYMPHOMA GRADE I, LYMPH NODES OF MULTIPLE SITES (H): ICD-10-CM

## 2022-01-28 DIAGNOSIS — C82.00 FOLLICULAR LYMPHOMA GRADE I, UNSPECIFIED BODY REGION (H): Primary | ICD-10-CM

## 2022-01-28 LAB
ALBUMIN SERPL-MCNC: 3.8 G/DL (ref 3.4–5)
ALP SERPL-CCNC: 96 U/L (ref 40–150)
ALT SERPL W P-5'-P-CCNC: 39 U/L (ref 0–70)
ANION GAP SERPL CALCULATED.3IONS-SCNC: 5 MMOL/L (ref 3–14)
APTT PPP: 34 SECONDS (ref 22–38)
AST SERPL W P-5'-P-CCNC: 17 U/L (ref 0–45)
BASOPHILS # BLD MANUAL: 0 10E3/UL (ref 0–0.2)
BASOPHILS NFR BLD MANUAL: 1 %
BILIRUB SERPL-MCNC: 0.5 MG/DL (ref 0.2–1.3)
BUN SERPL-MCNC: 18 MG/DL (ref 7–30)
CALCIUM SERPL-MCNC: 8.8 MG/DL (ref 8.5–10.1)
CHLORIDE BLD-SCNC: 108 MMOL/L (ref 94–109)
CO2 SERPL-SCNC: 27 MMOL/L (ref 20–32)
CREAT SERPL-MCNC: 1.07 MG/DL (ref 0.66–1.25)
CRP SERPL-MCNC: 6.6 MG/L (ref 0–8)
D DIMER PPP FEU-MCNC: 0.42 UG/ML FEU (ref 0–0.5)
EOSINOPHIL # BLD MANUAL: 0 10E3/UL (ref 0–0.7)
EOSINOPHIL NFR BLD MANUAL: 4 %
ERYTHROCYTE [DISTWIDTH] IN BLOOD BY AUTOMATED COUNT: 15 % (ref 10–15)
FERRITIN SERPL-MCNC: 124 NG/ML (ref 26–388)
FIBRINOGEN PPP-MCNC: 452 MG/DL (ref 170–490)
GFR SERPL CREATININE-BSD FRML MDRD: 80 ML/MIN/1.73M2
GLUCOSE BLD-MCNC: 106 MG/DL (ref 70–99)
HCT VFR BLD AUTO: 28.1 % (ref 40–53)
HGB BLD-MCNC: 9.5 G/DL (ref 13.3–17.7)
INR PPP: 1.03 (ref 0.85–1.15)
LDH SERPL L TO P-CCNC: 177 U/L (ref 85–227)
LYMPHOCYTES # BLD MANUAL: 0.4 10E3/UL (ref 0.8–5.3)
LYMPHOCYTES NFR BLD MANUAL: 37 %
MAGNESIUM SERPL-MCNC: 2.3 MG/DL (ref 1.6–2.3)
MCH RBC QN AUTO: 32.6 PG (ref 26.5–33)
MCHC RBC AUTO-ENTMCNC: 33.8 G/DL (ref 31.5–36.5)
MCV RBC AUTO: 97 FL (ref 78–100)
MONOCYTES # BLD MANUAL: 0.6 10E3/UL (ref 0–1.3)
MONOCYTES NFR BLD MANUAL: 46 %
NEUTROPHILS # BLD MANUAL: 0.1 10E3/UL (ref 1.6–8.3)
NEUTROPHILS NFR BLD MANUAL: 12 %
PHOSPHATE SERPL-MCNC: 2.8 MG/DL (ref 2.5–4.5)
PLAT MORPH BLD: ABNORMAL
PLATELET # BLD AUTO: 108 10E3/UL (ref 150–450)
POLYCHROMASIA BLD QL SMEAR: SLIGHT
POTASSIUM BLD-SCNC: 4.2 MMOL/L (ref 3.4–5.3)
PROT SERPL-MCNC: 6.2 G/DL (ref 6.8–8.8)
RBC # BLD AUTO: 2.91 10E6/UL (ref 4.4–5.9)
RBC MORPH BLD: ABNORMAL
SODIUM SERPL-SCNC: 140 MMOL/L (ref 133–144)
URATE SERPL-MCNC: 3.7 MG/DL (ref 3.5–7.2)
WBC # BLD AUTO: 1.2 10E3/UL (ref 4–11)

## 2022-01-28 PROCEDURE — 84550 ASSAY OF BLOOD/URIC ACID: CPT | Performed by: PHYSICIAN ASSISTANT

## 2022-01-28 PROCEDURE — 99213 OFFICE O/P EST LOW 20 MIN: CPT

## 2022-01-28 PROCEDURE — 85379 FIBRIN DEGRADATION QUANT: CPT | Performed by: PHYSICIAN ASSISTANT

## 2022-01-28 PROCEDURE — 83615 LACTATE (LD) (LDH) ENZYME: CPT | Performed by: PHYSICIAN ASSISTANT

## 2022-01-28 PROCEDURE — 85610 PROTHROMBIN TIME: CPT | Performed by: PHYSICIAN ASSISTANT

## 2022-01-28 PROCEDURE — 85384 FIBRINOGEN ACTIVITY: CPT | Performed by: PHYSICIAN ASSISTANT

## 2022-01-28 PROCEDURE — 86140 C-REACTIVE PROTEIN: CPT | Performed by: PHYSICIAN ASSISTANT

## 2022-01-28 PROCEDURE — 80053 COMPREHEN METABOLIC PANEL: CPT | Performed by: PHYSICIAN ASSISTANT

## 2022-01-28 PROCEDURE — 83735 ASSAY OF MAGNESIUM: CPT | Performed by: PHYSICIAN ASSISTANT

## 2022-01-28 PROCEDURE — 250N000011 HC RX IP 250 OP 636

## 2022-01-28 PROCEDURE — 85027 COMPLETE CBC AUTOMATED: CPT

## 2022-01-28 PROCEDURE — 85730 THROMBOPLASTIN TIME PARTIAL: CPT | Performed by: PHYSICIAN ASSISTANT

## 2022-01-28 PROCEDURE — 84100 ASSAY OF PHOSPHORUS: CPT | Performed by: PHYSICIAN ASSISTANT

## 2022-01-28 PROCEDURE — 82728 ASSAY OF FERRITIN: CPT | Performed by: PHYSICIAN ASSISTANT

## 2022-01-28 PROCEDURE — G0463 HOSPITAL OUTPT CLINIC VISIT: HCPCS

## 2022-01-28 RX ORDER — HEPARIN SODIUM (PORCINE) LOCK FLUSH IV SOLN 100 UNIT/ML 100 UNIT/ML
500 SOLUTION INTRAVENOUS ONCE
Status: COMPLETED | OUTPATIENT
Start: 2022-01-28 | End: 2022-01-28

## 2022-01-28 RX ORDER — TRAMADOL HYDROCHLORIDE 50 MG/1
50-100 TABLET ORAL EVERY 6 HOURS PRN
Qty: 20 TABLET | Refills: 0 | Status: SHIPPED | OUTPATIENT
Start: 2022-01-28 | End: 2022-07-26

## 2022-01-28 RX ADMIN — Medication 500 UNITS: at 09:40

## 2022-01-28 ASSESSMENT — PAIN SCALES - GENERAL: PAINLEVEL: MILD PAIN (3)

## 2022-01-28 NOTE — LETTER
1/28/2022         RE: Juvenal Blake  [address] [place] [address]        Dear Colleague,    Thank you for referring your patient, Juvenal Blake, to the [facility] [place]. Please see a copy of my visit note below.    BMT/Cell Therapy Daily Progress Note      Patient ID:  Juvenal Blake is a 56 year old male, currently day +11 s/p Yescarta CAR-T.      Diagnosis NHLFL Non-Hodgkin lymphoma, Follicular, predominantly large cell  BMTCT Type Cellular Therapy    Prep Regimen Fludarabine, Cytoxan  Donor Source Autologous     GVHD Prophylaxis NA  Primary BMT MD Dr. Irwin    Clinical Trials   2017-45         Admission date: 1/17/2022     INTERVAL  HISTORY   CC:  aches  HPI:  Aching in shoulders, wrists and calves/ankles.  This all started last night.  Heat packs and tylenol are helping. He also has a headache and pain at the back of his neck. Getting in about 60 ounces of fluid per day. No diarrhea, no constipation.  Nauseated, but no vomiting. Nausea is slowly improving. No cough.  Eating okay.  Ongoing abdominal discomfort with riding in the car or when his cat jumps up on him.     Review of Systems: 8 point ROS negative except as noted above.        PHYSICAL EXAM      KPS:  80  There were no vitals taken for this visit.     Wt Readings from Last 4 Encounters:   01/28/22 102.5 kg (226 lb)   01/27/22 103.1 kg (227 lb 4.8 oz)   01/26/22 102.6 kg (226 lb 3.2 oz)   01/25/22 102.3 kg (225 lb 9.6 oz)     General: NAD   Eyes: sclera anicteric   Lungs: CTA bilaterally  Cardiovascular: RRR, no M/R/G   Abdominal/Rectal: +BS, soft, chronic diffuse tenderness to palpation without rebound or guarding.    Lymphatics: no edema  Skin: No rash  Neuro: non-focal  Access:  R chest PAC.     ICE 10/10 1/27 sentence log now in clinic file cabinet (2E/F collab space)     LABS AND IMAGING: I have assessed all abnormal lab values for their clinical significance and any values  considered clinically significant have been addressed in the assessment and plan.         Juvenal Blake is a 56 year old male PMH significant for refractory NHL, Day +11 of Yescarta CAR-T therapy     1. Follicular Lymphoma/Yescarta CAR-T:  Day 0 Yescarta CAR-T 1/17/22  Relapsed after BR, [facility], NAM NK (9/1/20), idelalisib (~4-6/2020),  (6/28/21), polatuzumab (12/21/21). (hx Rituxan reaction).   - LD chemo with fludarabine/Cytoxan 1/12-1/14/22 prior to CAR-T infusion.    - cont allopurinol  CRP=8 1/24, next check 1/28  ICE 10/10 to date. Daily CAR-Tox note through at least D14.     2. HEME: Keep hgb >7g, plt>10.  #pancytopenia 2/2 chemo/lymphoma. Not requiring transfusions.      3. ID: Afebrile.  #Prophy: acyclovir, fluconazole, cefdinir (tendon pain with levaquin). Inhaled Pentamidine 1/13/21.     #Hx C diff colitis. Screening C.dif + on admission. Asymptomatic. If develops diarrhea, will Rx Vanco.     #hx parainfluenza 3 (12/21/21): Occasional cough, otherwise sx improved.  #hx rhinovirus (10/6/21).      #S/p 3 covid shots; first two were pre- and booster was after.  Defer to car -t team regarding if revaccination needed post CART.     Should get evusheld, but will wait until he is out of this acute Car-T period.     4. GI:    #JACQUE: compazine prn. Ativan available also  - cont PTA omeprazole  -  hx medical cannibis.  #Abdominal Pain secondary to cancer. Intermittent.      5. Renal/FEN: Creat, lytes wnl.  - replete prn per sliding scale  #severe malnutrition 2/2 chronic illness/cancer (based on wt loss). Use ensure/ high calorie snacks while intake is at/below 50%.  He reports 1/28 that he is eating pretty well.   - avoid NSAIDS     6. CV:    Cardiology cleared him to proceed with no further testing. The CT angiogram showed no lesion requiring stenting or bypass.     7. :   #hx Prostatic adenocarcinoma: s/p radical prostatectomy and bilateral lymph node dissection 11/2017. Completed radiation to the  prostate fossa and pelvic lymph nodes at [facility] early May 2018. He received 4500 cGy in 25 fractions. He then had 2340 cGy boost in 13 fractions to the prostatic fossa, for a total dose of 6240 cGy in 38 treatment fractions delivered over 54 days. He did not receive hormonal therapy:    9/10 PSA: 0.71 - no concerns.   - Repeat PSA drawn on 1/25 per request of urology; 1.21 (not elevated, but higher than last check in September)       8. Muscle/tendon aches:  Could be related to recent levaquin?  Tramadol prn, if tylenol is not adequate.  Avoid NSAIDS.  Considered giving GCSF for neutropenia, today, but will avoid as he is already in discomfort and he has no active infections.     RTC: Daily visits through day 14 for kary bains I spent 23 minutes in the care of this patient today, which included time necessary for preparation for the visit, obtaining history, ordering medications/tests/procedures as medically indicated, review of pertinent medical literature, counseling of the patient, communication of recommendations to the care team, and documentation time.      BMT Daily CARTOX Note (complete days 0-14 and with any subsequent CRS/neurotoxicity)    Date: January 28, 2022    Juvenal Blake ([id number]) is a 56 year old year old male who received infusion on 1/17/2022, currently day +11 of CAR-T cell therapy Yescarta    Vital Signs: /72 (BP Location: Right arm)   Pulse 68   Temp (!) 49.1  F (9.5  C) (Oral)   Resp 16   Wt 102.5 kg (226 lb)   SpO2 100%   BMI 34.85 kg/m      1) CRS Grading (based ONLY on parameters in box below)    Fever:   </= 100.4    Blood pressure:   SBP >/= 90 (not hypotensive)    Oxygen saturation:    >/= 90% on room air (not hypoxic)    CRS Parameter Grade 1  Grade 2 Grade 3 Grade 4   Fever* Tm >= 100.4 degrees F Tm >= 100.4 degrees F Tm >= 100.4 degrees F Tm >= to 100.4  degrees F      With Either:  Hypotension (SBP <90) None Responsive to Fluids  Requiring  1  vasopressor (w/ or w/o vasopressin) Requiring multiple  vasopressors  (excluding  vasopressin)     And/or  Hypoxia (O2 sats <90% on room air) None Low-flow nasal  cannula or blow-by High-flow nasal  cannula, face mask,  non-rebreather mask,or Venturi mask  Requiring positive  pressure (CPAP,  BiPAP intubation and  mechanical  ventilation)     CRS Summary  Does patient have CRS? No  Current CRS stgstrstastdstest:st st1st What therapy was given:n/a  Has CRS grade changed? n/a or No  Has CRS resolved? N/a       2) Neurotoxicity:    ICE Assessment  Orientation to year, month, city, hospital: 4 points, Name 3 objects (e.g., point to clock, pen, button): 3 point, Following commands: (e.g., Show me 2 fingers): 1 point, Write a standard sentence (e.g., The tang jumped over the log): 1 point and Count backwards from 100 by ten: 1 point  Total points: 10: No impairment    ASTCT ICANS Consensus Grading for Adults  ICE Score   10    Seizure   No seizures    Motor Findings   No motor findings    Elevated ICP/Cerebral Edema   None      Neurotoxicity  Domain Grade 1 Grade 2 Grade 3 Grade 4   ICE score 7-9 3-6 1-2 0   Depressed LOC      Awakens  spontaneously Awakens to  voice  Awakens only to  tactile stimulation Unarousable or  requires vigorous  or repetitive  tactile stimuli to  arouse. Stupor  or coma.   Seizure n/a n/a Any clinical  seizure focal or  generalized that  resolves rapidly  or nonconvulsive  seizures on EEG  that resolve with  intervention  Life-threatening  prolonged  seizure (>5 min);  or Repetitive  clinical or  electrical  seizures without  return to baseline  in between    Motor Findings      n/a n/a n/a Deep focal motor  weakness such  as hemiparesis  or paraparesis   Elevated  ICP/cerebral  edema  n/a n/a Focal/local  edema on  neuroimaging  Diffuse cerebral  edema on  neuroimaging;  decerebrate or  decorticate  posturing; or  cranial nerve VI  palsy; or  papilledema; or  Cushing's triad     ICANS grade is determined by  the most severe event (ICE score, level of consciousness, seizure, motor findings, raised ICP/cerebral edema) not attributable to any other cause; for example, a patient with an ICE score of 3 who has a generalized seizure is classified as grade 3 ICANS.    A patient with an ICE score of 0 may be classified as grade 3 ICANS if awake with global aphasia, but a patient with an ICE score of 0 may be classified as grade 4 ICANS if unarousable.     Depressed level of consciousness should be attributable to no other cause (eg, no sedating medication)    Tremors and myoclonus associated with immune effector cell therapies may be graded according to CTCAE v5.0,but they do not influence ICANS grading.     Intracranial hemorrhage with or without associated edema is not considered a neurotoxicity feature and is  excluded from ICANS grading. It may be graded according to CTCAE v5.0.      Neurotoxicity Summary  Does patient have neurotoxicity? No  Current Neurotoxicity score (0-10): 0  What therapy was given: n/a  Has neurotoxicity grade changed? n/a or No  Has neurotoxicity resolved? N/a       3) Organ CAR-T toxicity:    Cardiac   none    Respiratory   none    Gastrointestinal   nausea    Hepatic    none    Skin   none     Coagulopathy    none     Other: muscle/tendon aches    4) HLH   None     * CTCAE grading can be found at   https://ctep.cancer.gov/protocoldevelopment/electronic_applications/docs/CTCAE_v5_Quick Reference_8.5x11.pdf          Again, thank you for allowing me to participate in the care of your patient.      Sincerely,    BMT Advanced Practice Provider

## 2022-01-28 NOTE — NURSING NOTE
"Chief Complaint   Patient presents with     Port Draw     Labs drawn via port by RN. Vitals taken.     Port accessed with 20G 3/4\" flat needle by RN, labs collected, line flushed with saline and heparin.  Vitals taken. Pt checked in for appointment(s).      Tennille Zuniga, RN    "

## 2022-01-28 NOTE — PROGRESS NOTES
BMT Daily CARTOX Note (complete days 0-14 and with any subsequent CRS/neurotoxicity)    Date: January 28, 2022    Juvenal Blake ([id number]) is a 56 year old year old male who received infusion on 1/17/2022, currently day +11 of CAR-T cell therapy Yescarta    Vital Signs: /72 (BP Location: Right arm)   Pulse 68   Temp (!) 49.1  F (9.5  C) (Oral)   Resp 16   Wt 102.5 kg (226 lb)   SpO2 100%   BMI 34.85 kg/m      1) CRS Grading (based ONLY on parameters in box below)    Fever:   </= 100.4    Blood pressure:   SBP >/= 90 (not hypotensive)    Oxygen saturation:    >/= 90% on room air (not hypoxic)    CRS Parameter Grade 1  Grade 2 Grade 3 Grade 4   Fever* Tm >= 100.4 degrees F Tm >= 100.4 degrees F Tm >= 100.4 degrees F Tm >= to 100.4  degrees F      With Either:  Hypotension (SBP <90) None Responsive to Fluids  Requiring 1  vasopressor (w/ or w/o vasopressin) Requiring multiple  vasopressors  (excluding  vasopressin)     And/or  Hypoxia (O2 sats <90% on room air) None Low-flow nasal  cannula or blow-by High-flow nasal  cannula, face mask,  non-rebreather mask,or Venturi mask  Requiring positive  pressure (CPAP,  BiPAP intubation and  mechanical  ventilation)     CRS Summary  Does patient have CRS? No  Current CRS stgstrstastdstest:st st1st What therapy was given:n/a  Has CRS grade changed? n/a or No  Has CRS resolved? N/a       2) Neurotoxicity:    ICE Assessment  Orientation to year, month, city, hospital: 4 points, Name 3 objects (e.g., point to clock, pen, button): 3 point, Following commands: (e.g., Show me 2 fingers): 1 point, Write a standard sentence (e.g., The tang jumped over the log): 1 point and Count backwards from 100 by ten: 1 point  Total points: 10: No impairment    ASTCT ICANS Consensus Grading for Adults  ICE Score   10    Seizure   No seizures    Motor Findings   No motor findings    Elevated ICP/Cerebral Edema   None      Neurotoxicity  Domain Grade 1 Grade 2 Grade 3 Grade 4   ICE score 7-9 3-6 1-2 0    Depressed LOC      Awakens  spontaneously Awakens to  voice  Awakens only to  tactile stimulation Unarousable or  requires vigorous  or repetitive  tactile stimuli to  arouse. Stupor  or coma.   Seizure n/a n/a Any clinical  seizure focal or  generalized that  resolves rapidly  or nonconvulsive  seizures on EEG  that resolve with  intervention  Life-threatening  prolonged  seizure (>5 min);  or Repetitive  clinical or  electrical  seizures without  return to baseline  in between    Motor Findings      n/a n/a n/a Deep focal motor  weakness such  as hemiparesis  or paraparesis   Elevated  ICP/cerebral  edema  n/a n/a Focal/local  edema on  neuroimaging  Diffuse cerebral  edema on  neuroimaging;  decerebrate or  decorticate  posturing; or  cranial nerve VI  palsy; or  papilledema; or  Cushing's triad     ICANS grade is determined by the most severe event (ICE score, level of consciousness, seizure, motor findings, raised ICP/cerebral edema) not attributable to any other cause; for example, a patient with an ICE score of 3 who has a generalized seizure is classified as grade 3 ICANS.    A patient with an ICE score of 0 may be classified as grade 3 ICANS if awake with global aphasia, but a patient with an ICE score of 0 may be classified as grade 4 ICANS if unarousable.     Depressed level of consciousness should be attributable to no other cause (eg, no sedating medication)    Tremors and myoclonus associated with immune effector cell therapies may be graded according to CTCAE v5.0,but they do not influence ICANS grading.     Intracranial hemorrhage with or without associated edema is not considered a neurotoxicity feature and is  excluded from ICANS grading. It may be graded according to CTCAE v5.0.          Neurotoxicity Summary  Does patient have neurotoxicity? No  Current Neurotoxicity score (0-10): 0  What therapy was given: n/a  Has neurotoxicity grade changed? n/a or No  Has neurotoxicity resolved? N/a        3) Organ CAR-T toxicity:    Cardiac   none    Respiratory   none    Gastrointestinal   nausea    Hepatic    none    Skin   none     Coagulopathy    none     Other: muscle/tendon aches      4) HLH   None     * CTCAE grading can be found at   https://ctep.cancer.gov/protocoldevelopment/electronic_applications/docs/CTCAE_v5_Quick Reference_8.5x11.pdf

## 2022-01-28 NOTE — NURSING NOTE
"Oncology Rooming Note    January 28, 2022 9:58 AM   Juvenal Blake is a 56 year old male who presents for:    Chief Complaint   Patient presents with     Port Draw     Labs drawn via port by RN. Vitals taken.     Oncology Clinic Visit     [facility] RETURN - [facility]     Initial Vitals: /72 (BP Location: Right arm)   Pulse 68   Temp (!) 49.1  F (9.5  C) (Oral)   Resp 16   Wt 102.5 kg (226 lb)   SpO2 100%   BMI 34.85 kg/m   Estimated body mass index is 34.85 kg/m  as calculated from the following:    Height as of 1/17/22: 1.715 m (5' 7.52\").    Weight as of this encounter: 102.5 kg (226 lb). Body surface area is 2.21 meters squared.  Mild Pain (3) Comment: Data Unavailable   No LMP for male patient.  Allergies reviewed: Yes  Medications reviewed: Yes    Medications: Medication refills not needed today.  Pharmacy name entered into "[employer]": [facility] #[id number] - [place], [place] - [address] [facility]    Clinical concerns: No new concerns. Penny was notified.      Mustapha Kemp LPN            "

## 2022-01-28 NOTE — PROGRESS NOTES
BMT/Cell Therapy Daily Progress Note      Patient ID:  Juvenal Blake is a 56 year old male, currently day +11 s/p [facility] CAR-T.      Diagnosis NHLFL Non-Hodgkin lymphoma, Follicular, predominantly large cell  BMTCT Type Cellular Therapy    Prep Regimen Fludarabine, Cytoxan  Donor Source Autologous     GVHD Prophylaxis NA  Primary BMT MD Dr. Irwin    Clinical Trials   2017-45         Admission date: 1/17/2022     INTERVAL  HISTORY   CC:  aches  HPI:  Aching in shoulders, wrists and calves/ankles.  This all started last night.  Heat packs and tylenol are helping. He also has a headache and pain at the back of his neck. Getting in about 60 ounces of fluid per day. No diarrhea, no constipation.  Nauseated, but no vomiting. Nausea is slowly improving. No cough.  Eating okay.  Ongoing abdominal discomfort with riding in the car or when his cat jumps up on him.     Review of Systems: 8 point ROS negative except as noted above.        PHYSICAL EXAM      KPS:  80  There were no vitals taken for this visit.     Wt Readings from Last 4 Encounters:   01/28/22 102.5 kg (226 lb)   01/27/22 103.1 kg (227 lb 4.8 oz)   01/26/22 102.6 kg (226 lb 3.2 oz)   01/25/22 102.3 kg (225 lb 9.6 oz)     General: NAD   Eyes: sclera anicteric   Lungs: CTA bilaterally  Cardiovascular: RRR, no M/R/G   Abdominal/Rectal: +BS, soft, chronic diffuse tenderness to palpation without rebound or guarding.    Lymphatics: no edema  Skin: No rash  Neuro: non-focal  Access:  R chest PAC.     ICE 10/10 1/27 sentence log now in clinic file cabinet (2E/F collab space)     LABS AND IMAGING: I have assessed all abnormal lab values for their clinical significance and any values considered clinically significant have been addressed in the assessment and plan.         Juvenal Blake is a 56 year old male PMH significant for refractory NHL, Day +11 of [facility] CAR-T therapy     1. Follicular Lymphoma/[facility] CAR-T:  Day 0 [facility] CAR-T 1/17/22  Relapsed after  BR, [facility], NAM NK (9/1/20), idelalisib (~4-6/2020),  (6/28/21), polatuzumab (12/21/21). (hx Rituxan reaction).   - LD chemo with fludarabine/Cytoxan 1/12-1/14/22 prior to CAR-T infusion.    - cont allopurinol  CRP=8 1/24, next check 1/28  ICE 10/10 to date. Daily CAR-Tox note through at least D14.     2. HEME: Keep hgb >7g, plt>10.  #pancytopenia 2/2 chemo/lymphoma. Not requiring transfusions.      3. ID: Afebrile.  #Prophy: acyclovir, fluconazole, cefdinir (tendon pain with levaquin). Inhaled Pentamidine 1/13/21.     #Hx C diff colitis. Screening C.dif + on admission. Asymptomatic. If develops diarrhea, will Rx Vanco.     #hx parainfluenza 3 (12/21/21): Occasional cough, otherwise sx improved.  #hx rhinovirus (10/6/21).      #S/p 3 covid shots; first two were pre- and booster was after.  Defer to car -t team regarding if revaccination needed post CART.     Should get evusheld, but will wait until he is out of this acute Car-T period.     4. GI:    #JACQUE: compazine prn. Ativan available also  - cont PTA omeprazole  -  hx medical cannibis.  #Abdominal Pain secondary to cancer. Intermittent.      5. Renal/FEN: Creat, lytes wnl.  - replete prn per sliding scale  #severe malnutrition 2/2 chronic illness/cancer (based on wt loss). Use ensure/ high calorie snacks while intake is at/below 50%.  He reports 1/28 that he is eating pretty well.   - avoid NSAIDS     6. CV:    Cardiology cleared him to proceed with no further testing. The CT angiogram showed no lesion requiring stenting or bypass.     7. :   #hx Prostatic adenocarcinoma: s/p radical prostatectomy and bilateral lymph node dissection 11/2017. Completed radiation to the prostate fossa and pelvic lymph nodes at [facility] early May 2018. He received 4500 cGy in 25 fractions. He then had 2340 cGy boost in 13 fractions to the prostatic fossa, for a total dose of 6240 cGy in 38 treatment fractions delivered over 54 days. He did not receive hormonal  therapy:    9/10 PSA: 0.71 - no concerns.   - Repeat PSA drawn on 1/25 per request of urology; 1.21 (not elevated, but higher than last check in September)       8. Muscle/tendon aches:  Could be related to recent levaquin?  Tramadol prn, if tylenol is not adequate.  Avoid NSAIDS.  Considered giving GCSF for neutropenia, today, but will avoid as he is already in discomfort and he has no active infections.     RTC: Daily visits through day 14 for kary bains I spent 23 minutes in the care of this patient today, which included time necessary for preparation for the visit, obtaining history, ordering medications/tests/procedures as medically indicated, review of pertinent medical literature, counseling of the patient, communication of recommendations to the care team, and documentation time.

## 2022-01-29 ENCOUNTER — APPOINTMENT (OUTPATIENT)
Dept: LAB | Facility: CLINIC | Age: 57
End: 2022-01-29
Payer: COMMERCIAL

## 2022-01-29 ENCOUNTER — ONCOLOGY VISIT (OUTPATIENT)
Dept: TRANSPLANT | Facility: CLINIC | Age: 57
End: 2022-01-29
Payer: COMMERCIAL

## 2022-01-29 VITALS
DIASTOLIC BLOOD PRESSURE: 73 MMHG | HEART RATE: 71 BPM | TEMPERATURE: 98.4 F | OXYGEN SATURATION: 99 % | BODY MASS INDEX: 35.58 KG/M2 | SYSTOLIC BLOOD PRESSURE: 110 MMHG | WEIGHT: 230.7 LBS | RESPIRATION RATE: 14 BRPM

## 2022-01-29 DIAGNOSIS — Z92.850 S/P CHIMERIC ANTIGEN RECEPTOR T-CELL THERAPY: Primary | ICD-10-CM

## 2022-01-29 DIAGNOSIS — C82.00 FOLLICULAR LYMPHOMA GRADE I, UNSPECIFIED BODY REGION (H): ICD-10-CM

## 2022-01-29 LAB
ANION GAP SERPL CALCULATED.3IONS-SCNC: 1 MMOL/L (ref 3–14)
BASOPHILS # BLD MANUAL: 0.1 10E3/UL (ref 0–0.2)
BASOPHILS NFR BLD MANUAL: 6 %
BUN SERPL-MCNC: 17 MG/DL (ref 7–30)
CALCIUM SERPL-MCNC: 8.3 MG/DL (ref 8.5–10.1)
CHLORIDE BLD-SCNC: 108 MMOL/L (ref 94–109)
CO2 SERPL-SCNC: 26 MMOL/L (ref 20–32)
CREAT SERPL-MCNC: 1.27 MG/DL (ref 0.66–1.25)
EOSINOPHIL # BLD MANUAL: 0.1 10E3/UL (ref 0–0.7)
EOSINOPHIL NFR BLD MANUAL: 9 %
ERYTHROCYTE [DISTWIDTH] IN BLOOD BY AUTOMATED COUNT: 15.3 % (ref 10–15)
GFR SERPL CREATININE-BSD FRML MDRD: 65 ML/MIN/1.73M2
GLUCOSE BLD-MCNC: 106 MG/DL (ref 70–99)
HCT VFR BLD AUTO: 26.5 % (ref 40–53)
HGB BLD-MCNC: 9 G/DL (ref 13.3–17.7)
LYMPHOCYTES # BLD MANUAL: 0.5 10E3/UL (ref 0.8–5.3)
LYMPHOCYTES NFR BLD MANUAL: 34 %
MCH RBC QN AUTO: 32.5 PG (ref 26.5–33)
MCHC RBC AUTO-ENTMCNC: 34 G/DL (ref 31.5–36.5)
MCV RBC AUTO: 96 FL (ref 78–100)
MONOCYTES # BLD MANUAL: 0.6 10E3/UL (ref 0–1.3)
MONOCYTES NFR BLD MANUAL: 40 %
NEUTROPHILS # BLD MANUAL: 0.2 10E3/UL (ref 1.6–8.3)
NEUTROPHILS NFR BLD MANUAL: 11 %
PLAT MORPH BLD: ABNORMAL
PLATELET # BLD AUTO: 91 10E3/UL (ref 150–450)
POTASSIUM BLD-SCNC: 4 MMOL/L (ref 3.4–5.3)
RBC # BLD AUTO: 2.77 10E6/UL (ref 4.4–5.9)
RBC MORPH BLD: ABNORMAL
SODIUM SERPL-SCNC: 135 MMOL/L (ref 133–144)
WBC # BLD AUTO: 1.4 10E3/UL (ref 4–11)

## 2022-01-29 PROCEDURE — 80048 BASIC METABOLIC PNL TOTAL CA: CPT

## 2022-01-29 PROCEDURE — G0463 HOSPITAL OUTPT CLINIC VISIT: HCPCS

## 2022-01-29 PROCEDURE — 36591 DRAW BLOOD OFF VENOUS DEVICE: CPT

## 2022-01-29 PROCEDURE — 85027 COMPLETE CBC AUTOMATED: CPT

## 2022-01-29 PROCEDURE — 99214 OFFICE O/P EST MOD 30 MIN: CPT

## 2022-01-29 PROCEDURE — 250N000011 HC RX IP 250 OP 636

## 2022-01-29 RX ORDER — HEPARIN SODIUM (PORCINE) LOCK FLUSH IV SOLN 100 UNIT/ML 100 UNIT/ML
5 SOLUTION INTRAVENOUS EVERY 8 HOURS
Status: DISCONTINUED | OUTPATIENT
Start: 2022-01-29 | End: 2022-01-29 | Stop reason: HOSPADM

## 2022-01-29 RX ADMIN — Medication 5 ML: at 07:46

## 2022-01-29 ASSESSMENT — PAIN SCALES - GENERAL: PAINLEVEL: SEVERE PAIN (7)

## 2022-01-29 NOTE — PROGRESS NOTES
BMT Daily CARTOX Note (complete days 0-14 and with any subsequent CRS/neurotoxicity)    Date: January 29, 2022    Juvenal Blake ([id number]) is a 56 year old year old male who received infusion on 1/17/2022, currently day +11 of CAR-T cell therapy [facility]    Vital Signs: /73   Pulse 71   Temp 98.4  F (36.9  C) (Oral)   Resp 14   Wt 104.6 kg (230 lb 11.2 oz)   SpO2 99%   BMI 35.58 kg/m      1) CRS Grading (based ONLY on parameters in box below)    Fever:   </= 100.4    Blood pressure:   SBP >/= 90 (not hypotensive)    Oxygen saturation:    >/= 90% on room air (not hypoxic)    CRS Parameter Grade 1  Grade 2 Grade 3 Grade 4   Fever* Tm >= 100.4 degrees F Tm >= 100.4 degrees F Tm >= 100.4 degrees F Tm >= to 100.4  degrees F      With Either:  Hypotension (SBP <90) None Responsive to Fluids  Requiring 1  vasopressor (w/ or w/o vasopressin) Requiring multiple  vasopressors  (excluding  vasopressin)     And/or  Hypoxia (O2 sats <90% on room air) None Low-flow nasal  cannula or blow-by High-flow nasal  cannula, face mask,  non-rebreather mask,or Venturi mask  Requiring positive  pressure (CPAP,  BiPAP intubation and  mechanical  ventilation)     CRS Summary  Does patient have CRS? No  Current CRS stgstrstastdstest:st st1st What therapy was given:n/a  Has CRS grade changed? n/a or No  Has CRS resolved? N/a       2) Neurotoxicity:    ICE Assessment  Orientation to year, month, city, hospital: 4 points, Name 3 objects (e.g., point to clock, pen, button): 3 point, Following commands: (e.g., Show me 2 fingers): 1 point, Write a standard sentence (e.g., The tang jumped over the log): 1 point and Count backwards from 100 by ten: 1 point  Total points: 10: No impairment    ASTCT ICANS Consensus Grading for Adults  ICE Score   10    Seizure   No seizures    Motor Findings   No motor findings    Elevated ICP/Cerebral Edema   None      Neurotoxicity  Domain Grade 1 Grade 2 Grade 3 Grade 4   ICE score 7-9 3-6 1-2 0   Depressed LOC       Awakens  spontaneously Awakens to  voice  Awakens only to  tactile stimulation Unarousable or  requires vigorous  or repetitive  tactile stimuli to  arouse. Stupor  or coma.   Seizure n/a n/a Any clinical  seizure focal or  generalized that  resolves rapidly  or nonconvulsive  seizures on EEG  that resolve with  intervention  Life-threatening  prolonged  seizure (>5 min);  or Repetitive  clinical or  electrical  seizures without  return to baseline  in between    Motor Findings      n/a n/a n/a Deep focal motor  weakness such  as hemiparesis  or paraparesis   Elevated  ICP/cerebral  edema  n/a n/a Focal/local  edema on  neuroimaging  Diffuse cerebral  edema on  neuroimaging;  decerebrate or  decorticate  posturing; or  cranial nerve VI  palsy; or  papilledema; or  Cushing's triad     ICANS grade is determined by the most severe event (ICE score, level of consciousness, seizure, motor findings, raised ICP/cerebral edema) not attributable to any other cause; for example, a patient with an ICE score of 3 who has a generalized seizure is classified as grade 3 ICANS.    A patient with an ICE score of 0 may be classified as grade 3 ICANS if awake with global aphasia, but a patient with an ICE score of 0 may be classified as grade 4 ICANS if unarousable.     Depressed level of consciousness should be attributable to no other cause (eg, no sedating medication)    Tremors and myoclonus associated with immune effector cell therapies may be graded according to CTCAE v5.0,but they do not influence ICANS grading.     Intracranial hemorrhage with or without associated edema is not considered a neurotoxicity feature and is  excluded from ICANS grading. It may be graded according to CTCAE v5.0.          Neurotoxicity Summary  Does patient have neurotoxicity? No  Current Neurotoxicity score (0-10): 0  What therapy was given: n/a  Has neurotoxicity grade changed? n/a or No  Has neurotoxicity resolved? N/a       3) Organ CAR-T  toxicity:    Cardiac   none    Respiratory   none    Gastrointestinal   nausea    Hepatic    none    Skin   none     Coagulopathy    none     Other: muscle/tendon aches      4) HLH   None     * CTCAE grading can be found at   https://ctep.cancer.gov/protocoldevelopment/electronic_applications/docs/CTCAE_v5_Quick Reference_8.5x11.pdf    Rosa Terry MD

## 2022-01-29 NOTE — LETTER
1/29/2022         RE: Juvenal Blake  [address] [place] [address]        Dear Colleague,    Thank you for referring your patient, Juvenal Blake, to the [facility] [place]. Please see a copy of my visit note below.    BMT/Cell Therapy Daily Progress Note      Patient ID:  Juvenal Blake is a 56 year old male, currently day +12 s/p [facility] CAR-T.      Diagnosis NHLFL Non-Hodgkin lymphoma, Follicular, predominantly large cell  BMTCT Type Cellular Therapy    Prep Regimen Fludarabine, Cytoxan  Donor Source Autologous     GVHD Prophylaxis NA  Primary BMT MD Dr. Irwin    Clinical Trials   2017-45         Admission date: 1/17/2022     INTERVAL  HISTORY   CC:  aches  HPI:  Juvenal is feeling well, better, did not mention aching today   Lump in R inguinal area is almost gone. No fevers, no chills, he is dizzy first as he gets up but it does not stay.    No N/V/D. No cough.  Eating okay.  Still ongoing abdominal discomfort with riding in the car or when his cat jumps up on him.     Review of Systems: 8 point ROS negative except as noted above.        PHYSICAL EXAM      KPS:  80  /73   Pulse 71   Temp 98.4  F (36.9  C) (Oral)   Resp 14   Wt 104.6 kg (230 lb 11.2 oz)   SpO2 99%   BMI 35.58 kg/m       Wt Readings from Last 4 Encounters:   01/29/22 104.6 kg (230 lb 11.2 oz)   01/28/22 102.5 kg (226 lb)   01/27/22 103.1 kg (227 lb 4.8 oz)   01/26/22 102.6 kg (226 lb 3.2 oz)     General: NAD, quiet,    Eyes: sclera anicteric   Lungs: CTA bilaterally  Cardiovascular: RRR, no M/R/G   Abdominal/Rectal: +BS, soft, chronic diffuse tenderness to palpation without rebound or guarding.    Lymphatics: no edema, no axillary or inguinal adenopathy   Skin: No rash  Neuro: non-focal  Access:  R chest PAC.     ICE 10/10 1/29 sentence log now in clinic file cabinet (2E/F collab space)     LABS AND IMAGING: I have assessed all abnormal lab values for their clinical  significance and any values considered clinically significant have been addressed in the assessment and plan.         Juvenal Blake is a 56 year old male PMH significant for refractory NHL, Day +12 of [facility] CAR-T therapy     1. Follicular Lymphoma/[facility] CAR-T:  Day 0 [facility] CAR-T 1/17/22  Relapsed after BR, [facility], NAM NK (9/1/20), idelalisib (~4-6/2020),  (6/28/21), polatuzumab (12/21/21). (hx Rituxan reaction).   - LD chemo with fludarabine/Cytoxan 1/12-1/14/22 prior to CAR-T infusion.    - cont allopurinol  CRP=8 1/24, next check 1/28  ICE 10/10 to date. Daily CAR-Tox note through at least D14.     2. HEME: Keep hgb >7g, plt>10.  #pancytopenia 2/2 chemo/lymphoma. Not requiring transfusions.      3. ID: Afebrile.  #Prophy: acyclovir, fluconazole, cefdinir (tendon pain with levaquin). Inhaled Pentamidine 1/13/21.     #Hx C diff colitis. Screening C.dif + on admission. Asymptomatic. If develops diarrhea, will Rx Vanco.     #hx parainfluenza 3 (12/21/21): Occasional cough, otherwise sx improved.  #hx rhinovirus (10/6/21).      #S/p 3 covid shots; first two were pre- and booster was after.  Defer to car -t team regarding if revaccination needed post CART.     Should get evusheld, but will wait until he is out of this acute Car-T period.     4. GI:    #JACQUE: compazine prn. Ativan available also  - cont PTA omeprazole  -  hx medical cannibis.  #Abdominal Pain secondary to cancer. Intermittent.      5. Renal/FEN: Creat, lytes wnl.  - replete prn per sliding scale  #severe malnutrition 2/2 chronic illness/cancer (based on wt loss). Use ensure/ high calorie snacks while intake is at/below 50%.  He reports 1/28 that he is eating pretty well.   - avoid NSAIDS     6. CV:    Cardiology cleared him to proceed with no further testing. The CT angiogram showed no lesion requiring stenting or bypass.     7. :   #hx Prostatic adenocarcinoma: s/p radical prostatectomy and bilateral lymph node dissection 11/2017.  Completed radiation to the prostate fossa and pelvic lymph nodes at [facility] early May 2018. He received 4500 cGy in 25 fractions. He then had 2340 cGy boost in 13 fractions to the prostatic fossa, for a total dose of 6240 cGy in 38 treatment fractions delivered over 54 days. He did not receive hormonal therapy:    9/10 PSA: 0.71 - no concerns.   - Repeat PSA drawn on 1/25 per request of urology; 1.21 (not elevated, but higher than last check in September)       8. Muscle/tendon aches:  Could be related to recent levaquin?  Tramadol prn, if tylenol is not adequate.  Avoid NSAIDS.  Considered giving GCSF for neutropenia, today, but will avoid as he is already in discomfort and he has no active infections.     RTC: Daily visits through day 14 for kary bains I spent 23 minutes in the care of this patient today, which included time necessary for preparation for the visit, obtaining history, ordering medications/tests/procedures as medically indicated, review of pertinent medical literature, counseling of the patient, communication of recommendations to the care team, and documentation time.    Rosa Terry MD  Professor of Medicine  [phone]      BMT Daily CARTOX Note (complete days 0-14 and with any subsequent CRS/neurotoxicity)    Date: January 29, 2022    Juvenal Blake ([id number]) is a 56 year old year old male who received infusion on 1/17/2022, currently day +11 of CAR-T cell therapy [facility]    Vital Signs: /73   Pulse 71   Temp 98.4  F (36.9  C) (Oral)   Resp 14   Wt 104.6 kg (230 lb 11.2 oz)   SpO2 99%   BMI 35.58 kg/m      1) CRS Grading (based ONLY on parameters in box below)    Fever:   </= 100.4    Blood pressure:   SBP >/= 90 (not hypotensive)    Oxygen saturation:    >/= 90% on room air (not hypoxic)    CRS Parameter Grade 1  Grade 2 Grade 3 Grade 4   Fever* Tm >= 100.4 degrees F Tm >= 100.4 degrees F Tm >= 100.4 degrees F Tm >= to 100.4  degrees F      With  Either:  Hypotension (SBP <90) None Responsive to Fluids  Requiring 1  vasopressor (w/ or w/o vasopressin) Requiring multiple  vasopressors  (excluding  vasopressin)     And/or  Hypoxia (O2 sats <90% on room air) None Low-flow nasal  cannula or blow-by High-flow nasal  cannula, face mask,  non-rebreather mask,or Venturi mask  Requiring positive  pressure (CPAP,  BiPAP intubation and  mechanical  ventilation)     CRS Summary  Does patient have CRS? No  Current CRS stgstrstastdstest:st st1st What therapy was given:n/a  Has CRS grade changed? n/a or No  Has CRS resolved? N/a       2) Neurotoxicity:    ICE Assessment  Orientation to year, month, city, hospital: 4 points, Name 3 objects (e.g., point to clock, pen, button): 3 point, Following commands: (e.g., Show me 2 fingers): 1 point, Write a standard sentence (e.g., The tang jumped over the log): 1 point and Count backwards from 100 by ten: 1 point  Total points: 10: No impairment    ASTCT ICANS Consensus Grading for Adults  ICE Score   10    Seizure   No seizures    Motor Findings   No motor findings    Elevated ICP/Cerebral Edema   None      Neurotoxicity  Domain Grade 1 Grade 2 Grade 3 Grade 4   ICE score 7-9 3-6 1-2 0   Depressed LOC      Awakens  spontaneously Awakens to  voice  Awakens only to  tactile stimulation Unarousable or  requires vigorous  or repetitive  tactile stimuli to  arouse. Stupor  or coma.   Seizure n/a n/a Any clinical  seizure focal or  generalized that  resolves rapidly  or nonconvulsive  seizures on EEG  that resolve with  intervention  Life-threatening  prolonged  seizure (>5 min);  or Repetitive  clinical or  electrical  seizures without  return to baseline  in between    Motor Findings      n/a n/a n/a Deep focal motor  weakness such  as hemiparesis  or paraparesis   Elevated  ICP/cerebral  edema  n/a n/a Focal/local  edema on  neuroimaging  Diffuse cerebral  edema on  neuroimaging;  decerebrate or  decorticate  posturing; or  cranial nerve VI  palsy;  or  papilledema; or  Cushing's triad     ICANS grade is determined by the most severe event (ICE score, level of consciousness, seizure, motor findings, raised ICP/cerebral edema) not attributable to any other cause; for example, a patient with an ICE score of 3 who has a generalized seizure is classified as grade 3 ICANS.    A patient with an ICE score of 0 may be classified as grade 3 ICANS if awake with global aphasia, but a patient with an ICE score of 0 may be classified as grade 4 ICANS if unarousable.     Depressed level of consciousness should be attributable to no other cause (eg, no sedating medication)    Tremors and myoclonus associated with immune effector cell therapies may be graded according to CTCAE v5.0,but they do not influence ICANS grading.     Intracranial hemorrhage with or without associated edema is not considered a neurotoxicity feature and is  excluded from ICANS grading. It may be graded according to CTCAE v5.0.          Neurotoxicity Summary  Does patient have neurotoxicity? No  Current Neurotoxicity score (0-10): 0  What therapy was given: n/a  Has neurotoxicity grade changed? n/a or No  Has neurotoxicity resolved? N/a       3) Organ CAR-T toxicity:    Cardiac   none    Respiratory   none    Gastrointestinal   nausea    Hepatic    none    Skin   none     Coagulopathy    none     Other: muscle/tendon aches      4) HLH   None     * CTCAE grading can be found at   https://ctep.cancer.gov/protocoldevelopment/electronic_applications/docs/CTCAE_v5_Quick Reference_8.5x11.pdf    Rosa Terry MD          Again, thank you for allowing me to participate in the care of your patient.      Sincerely,    BMT DOM

## 2022-01-29 NOTE — PROGRESS NOTES
BMT/Cell Therapy Daily Progress Note      Patient ID:  Juvenal Blake is a 56 year old male, currently day +12 s/p [facility] CAR-T.      Diagnosis NHLFL Non-Hodgkin lymphoma, Follicular, predominantly large cell  BMTCT Type Cellular Therapy    Prep Regimen Fludarabine, Cytoxan  Donor Source Autologous     GVHD Prophylaxis NA  Primary BMT MD Dr. Irwin    Clinical Trials   2017-45         Admission date: 1/17/2022     INTERVAL  HISTORY   CC:  aches  HPI:  Juvenal is feeling well, better, did not mention aching today   Lump in R inguinal area is almost gone. No fevers, no chills, he is dizzy first as he gets up but it does not stay.    No N/V/D. No cough.  Eating okay.  Still ongoing abdominal discomfort with riding in the car or when his cat jumps up on him.     Review of Systems: 8 point ROS negative except as noted above.        PHYSICAL EXAM      KPS:  80  /73   Pulse 71   Temp 98.4  F (36.9  C) (Oral)   Resp 14   Wt 104.6 kg (230 lb 11.2 oz)   SpO2 99%   BMI 35.58 kg/m       Wt Readings from Last 4 Encounters:   01/29/22 104.6 kg (230 lb 11.2 oz)   01/28/22 102.5 kg (226 lb)   01/27/22 103.1 kg (227 lb 4.8 oz)   01/26/22 102.6 kg (226 lb 3.2 oz)     General: NAD, quiet,    Eyes: sclera anicteric   Lungs: CTA bilaterally  Cardiovascular: RRR, no M/R/G   Abdominal/Rectal: +BS, soft, chronic diffuse tenderness to palpation without rebound or guarding.    Lymphatics: no edema, no axillary or inguinal adenopathy   Skin: No rash  Neuro: non-focal  Access:  R chest PAC.     ICE 10/10 1/29 sentence log now in clinic file cabinet (2E/F collab space)     LABS AND IMAGING: I have assessed all abnormal lab values for their clinical significance and any values considered clinically significant have been addressed in the assessment and plan.         Juvenal Blake is a 56 year old male PMH significant for refractory NHL, Day +12 of [facility] CAR-T therapy     1. Follicular Lymphoma/[facility] CAR-T:  Day 0 [facility]  CAR-T 1/17/22  Relapsed after BR, [facility], NAM NK (9/1/20), idelalisib (~4-6/2020),  (6/28/21), polatuzumab (12/21/21). (hx Rituxan reaction).   - LD chemo with fludarabine/Cytoxan 1/12-1/14/22 prior to CAR-T infusion.    - cont allopurinol  CRP=8 1/24, next check 1/28  ICE 10/10 to date. Daily CAR-Tox note through at least D14.     2. HEME: Keep hgb >7g, plt>10.  #pancytopenia 2/2 chemo/lymphoma. Not requiring transfusions.      3. ID: Afebrile.  #Prophy: acyclovir, fluconazole, cefdinir (tendon pain with levaquin). Inhaled Pentamidine 1/13/21.     #Hx C diff colitis. Screening C.dif + on admission. Asymptomatic. If develops diarrhea, will Rx Vanco.     #hx parainfluenza 3 (12/21/21): Occasional cough, otherwise sx improved.  #hx rhinovirus (10/6/21).      #S/p 3 covid shots; first two were pre- and booster was after.  Defer to car -t team regarding if revaccination needed post CART.     Should get evusheld, but will wait until he is out of this acute Car-T period.     4. GI:    #JACQUE: compazine prn. Ativan available also  - cont PTA omeprazole  -  hx medical cannibis.  #Abdominal Pain secondary to cancer. Intermittent.      5. Renal/FEN: Creat, lytes wnl.  - replete prn per sliding scale  #severe malnutrition 2/2 chronic illness/cancer (based on wt loss). Use ensure/ high calorie snacks while intake is at/below 50%.  He reports 1/28 that he is eating pretty well.   - avoid NSAIDS     6. CV:    Cardiology cleared him to proceed with no further testing. The CT angiogram showed no lesion requiring stenting or bypass.     7. :   #hx Prostatic adenocarcinoma: s/p radical prostatectomy and bilateral lymph node dissection 11/2017. Completed radiation to the prostate fossa and pelvic lymph nodes at [facility] early May 2018. He received 4500 cGy in 25 fractions. He then had 2340 cGy boost in 13 fractions to the prostatic fossa, for a total dose of 6240 cGy in 38 treatment fractions delivered over 54 days.  He did not receive hormonal therapy:    9/10 PSA: 0.71 - no concerns.   - Repeat PSA drawn on 1/25 per request of urology; 1.21 (not elevated, but higher than last check in September)       8. Muscle/tendon aches:  Could be related to recent levaquin?  Tramadol prn, if tylenol is not adequate.  Avoid NSAIDS.  Considered giving GCSF for neutropenia, today, but will avoid as he is already in discomfort and he has no active infections.     RTC: Daily visits through day 14 for kary bains I spent 23 minutes in the care of this patient today, which included time necessary for preparation for the visit, obtaining history, ordering medications/tests/procedures as medically indicated, review of pertinent medical literature, counseling of the patient, communication of recommendations to the care team, and documentation time.    Rosa Terry MD  Professor of Medicine  [phone]

## 2022-01-29 NOTE — NURSING NOTE
"Oncology Rooming Note    January 29, 2022 7:43 AM   Juvenal Blake is a 56 year old male who presents for:    Chief Complaint   Patient presents with     Oncology Clinic Visit     return clinic visit s/p BMT dx Lymphoma     Initial Vitals: There were no vitals taken for this visit. Estimated body mass index is 34.85 kg/m  as calculated from the following:    Height as of 1/17/22: 1.715 m (5' 7.52\").    Weight as of 1/28/22: 102.5 kg (226 lb). There is no height or weight on file to calculate BSA.  Data Unavailable Comment: Data Unavailable   No LMP for male patient.  Allergies reviewed: Yes  Medications reviewed: Yes    Medications: Medication refills not needed today.  Pharmacy name entered into [employer] [id number] - [place], [place] - [address] AT [facility]    Clinical concerns: No new clinical concerns.        Gloria Holbrook RN              "

## 2022-01-30 ENCOUNTER — INFUSION THERAPY VISIT (OUTPATIENT)
Dept: TRANSPLANT | Facility: CLINIC | Age: 57
End: 2022-01-30
Payer: COMMERCIAL

## 2022-01-30 ENCOUNTER — ONCOLOGY VISIT (OUTPATIENT)
Dept: TRANSPLANT | Facility: CLINIC | Age: 57
End: 2022-01-30
Payer: COMMERCIAL

## 2022-01-30 ENCOUNTER — APPOINTMENT (OUTPATIENT)
Dept: LAB | Facility: CLINIC | Age: 57
End: 2022-01-30
Payer: COMMERCIAL

## 2022-01-30 ENCOUNTER — HEALTH MAINTENANCE LETTER (OUTPATIENT)
Age: 57
End: 2022-01-30

## 2022-01-30 VITALS
SYSTOLIC BLOOD PRESSURE: 148 MMHG | BODY MASS INDEX: 35.52 KG/M2 | OXYGEN SATURATION: 98 % | WEIGHT: 230.3 LBS | TEMPERATURE: 97.9 F | DIASTOLIC BLOOD PRESSURE: 82 MMHG | HEART RATE: 70 BPM | RESPIRATION RATE: 18 BRPM

## 2022-01-30 DIAGNOSIS — C82.90 FOLLICULAR LYMPHOMA (H): Primary | ICD-10-CM

## 2022-01-30 DIAGNOSIS — C82.08 FOLLICULAR LYMPHOMA GRADE I, LYMPH NODES OF MULTIPLE SITES (H): Primary | ICD-10-CM

## 2022-01-30 DIAGNOSIS — Z92.850 S/P CHIMERIC ANTIGEN RECEPTOR T-CELL THERAPY: ICD-10-CM

## 2022-01-30 LAB
ANION GAP SERPL CALCULATED.3IONS-SCNC: 5 MMOL/L (ref 3–14)
BASOPHILS # BLD MANUAL: 0 10E3/UL (ref 0–0.2)
BASOPHILS NFR BLD MANUAL: 2 %
BUN SERPL-MCNC: 16 MG/DL (ref 7–30)
CALCIUM SERPL-MCNC: 8.5 MG/DL (ref 8.5–10.1)
CHLORIDE BLD-SCNC: 107 MMOL/L (ref 94–109)
CO2 SERPL-SCNC: 27 MMOL/L (ref 20–32)
CREAT SERPL-MCNC: 1.22 MG/DL (ref 0.66–1.25)
EOSINOPHIL # BLD MANUAL: 0.1 10E3/UL (ref 0–0.7)
EOSINOPHIL NFR BLD MANUAL: 6 %
ERYTHROCYTE [DISTWIDTH] IN BLOOD BY AUTOMATED COUNT: 15 % (ref 10–15)
GFR SERPL CREATININE-BSD FRML MDRD: 69 ML/MIN/1.73M2
GLUCOSE BLD-MCNC: 110 MG/DL (ref 70–99)
HCT VFR BLD AUTO: 27.3 % (ref 40–53)
HGB BLD-MCNC: 9.2 G/DL (ref 13.3–17.7)
LYMPHOCYTES # BLD MANUAL: 0.4 10E3/UL (ref 0.8–5.3)
LYMPHOCYTES NFR BLD MANUAL: 25 %
MCH RBC QN AUTO: 32.4 PG (ref 26.5–33)
MCHC RBC AUTO-ENTMCNC: 33.7 G/DL (ref 31.5–36.5)
MCV RBC AUTO: 96 FL (ref 78–100)
METAMYELOCYTES # BLD MANUAL: 0 10E3/UL
METAMYELOCYTES NFR BLD MANUAL: 2 %
MONOCYTES # BLD MANUAL: 0.5 10E3/UL (ref 0–1.3)
MONOCYTES NFR BLD MANUAL: 38 %
NEUTROPHILS # BLD MANUAL: 0.4 10E3/UL (ref 1.6–8.3)
NEUTROPHILS NFR BLD MANUAL: 27 %
NRBC # BLD AUTO: 0 10E3/UL
NRBC BLD MANUAL-RTO: 1 %
PLAT MORPH BLD: ABNORMAL
PLATELET # BLD AUTO: 90 10E3/UL (ref 150–450)
POTASSIUM BLD-SCNC: 4.2 MMOL/L (ref 3.4–5.3)
RBC # BLD AUTO: 2.84 10E6/UL (ref 4.4–5.9)
RBC MORPH BLD: ABNORMAL
SODIUM SERPL-SCNC: 139 MMOL/L (ref 133–144)
WBC # BLD AUTO: 1.4 10E3/UL (ref 4–11)

## 2022-01-30 PROCEDURE — G0463 HOSPITAL OUTPT CLINIC VISIT: HCPCS

## 2022-01-30 PROCEDURE — 36591 DRAW BLOOD OFF VENOUS DEVICE: CPT

## 2022-01-30 PROCEDURE — 96360 HYDRATION IV INFUSION INIT: CPT

## 2022-01-30 PROCEDURE — 99214 OFFICE O/P EST MOD 30 MIN: CPT

## 2022-01-30 PROCEDURE — 258N000003 HC RX IP 258 OP 636

## 2022-01-30 PROCEDURE — 85027 COMPLETE CBC AUTOMATED: CPT

## 2022-01-30 PROCEDURE — 82310 ASSAY OF CALCIUM: CPT

## 2022-01-30 PROCEDURE — 250N000011 HC RX IP 250 OP 636

## 2022-01-30 RX ORDER — HEPARIN SODIUM (PORCINE) LOCK FLUSH IV SOLN 100 UNIT/ML 100 UNIT/ML
5 SOLUTION INTRAVENOUS ONCE
Status: COMPLETED | OUTPATIENT
Start: 2022-01-30 | End: 2022-01-30

## 2022-01-30 RX ADMIN — Medication 5 ML: at 07:43

## 2022-01-30 RX ADMIN — SODIUM CHLORIDE 1000 ML: 9 INJECTION, SOLUTION INTRAVENOUS at 08:46

## 2022-01-30 ASSESSMENT — PAIN SCALES - GENERAL: PAINLEVEL: MODERATE PAIN (4)

## 2022-01-30 NOTE — NURSING NOTE
"Oncology Rooming Note    January 30, 2022 8:12 AM   Juvenal Blake is a 56 year old male who presents for:    Chief Complaint   Patient presents with     Port Draw     Labs drawn via port by RN in lab. VS taken.      RECHECK     day +13 s/p CART     Initial Vitals: BP (!) 148/82 (BP Location: Right arm, Patient Position: Sitting, Cuff Size: Adult Large)   Pulse 70   Temp 97.9  F (36.6  C) (Oral)   Resp 18   Wt 104.5 kg (230 lb 4.8 oz)   SpO2 98%   BMI 35.52 kg/m   Estimated body mass index is 35.52 kg/m  as calculated from the following:    Height as of 1/17/22: 1.715 m (5' 7.52\").    Weight as of this encounter: 104.5 kg (230 lb 4.8 oz). Body surface area is 2.23 meters squared.  Moderate Pain (4) Comment: Data Unavailable   No LMP for male patient.  Allergies reviewed: Yes  Medications reviewed: Yes    Medications: Medication refills not needed today.  Pharmacy name entered into [email or web address]: [facility] #[id number] - [place], [place] - [address] AT [facility]    Clinical concerns: none       Apryl Mayorga RN            "

## 2022-01-30 NOTE — LETTER
1/30/2022         RE: Juvenal Blake  [address] [place] [address]        Dear Colleague,    Thank you for referring your patient, Juvenal Blake, to the [facility] [place]. Please see a copy of my visit note below.    Infusion Nursing Note:  Juvenal Blake presents today for add on IV fluids.    Patient seen by provider today: Yes: Dr. Terry   present during visit today: Not Applicable.    Note: Juvenal here today for day +13 s/p CART visit. Add on 1L NS bolus per Dr. Terry. Tolerated well without difficulty.      Intravenous Access:  Implanted Port.    Treatment Conditions:  Lab Results   Component Value Date    HGB 9.2 (L) 01/30/2022    WBC 1.4 (L) 01/30/2022    ANEU 0.4 (LL) 01/30/2022    ANEUTAUTO 0.4 (LL) 01/25/2022    PLT 90 (L) 01/30/2022      Lab Results   Component Value Date     01/30/2022    POTASSIUM 4.2 01/30/2022    MAG 2.3 01/28/2022    CR 1.22 01/30/2022    TRE 8.5 01/30/2022    BILITOTAL 0.5 01/28/2022    ALBUMIN 3.8 01/28/2022    ALT 39 01/28/2022    AST 17 01/28/2022     Post Infusion Assessment:  Patient tolerated infusion without incident.  Blood return noted pre and post infusion.  Access discontinued per protocol.     Discharge Plan:   Patient discharged in stable condition accompanied by: wife.  Departure Mode: Ambulatory.    Apryl Mayorga RN            Again, thank you for allowing me to participate in the care of your patient.      Sincerely,    [facility]

## 2022-01-30 NOTE — NURSING NOTE
Chief Complaint   Patient presents with     Port Draw     Labs drawn via port by RN in lab. VS taken.      Port accessed with 20 gauge 3/4 inch flat needle and labs drawn by RN.  Port flushed with saline and heparin. Port then de-accessed. Pt tolerated well.      Merle Mendez, RN

## 2022-01-30 NOTE — PROGRESS NOTES
Infusion Nursing Note:  Juvenal Blake presents today for add on IV fluids.    Patient seen by provider today: Yes: Dr. Terry   present during visit today: Not Applicable.    Note: Juvenal here today for day +13 s/p CART visit. Add on 1L NS bolus per Dr. Terry. Tolerated well without difficulty.      Intravenous Access:  Implanted Port.    Treatment Conditions:  Lab Results   Component Value Date    HGB 9.2 (L) 01/30/2022    WBC 1.4 (L) 01/30/2022    ANEU 0.4 (LL) 01/30/2022    ANEUTAUTO 0.4 (LL) 01/25/2022    PLT 90 (L) 01/30/2022      Lab Results   Component Value Date     01/30/2022    POTASSIUM 4.2 01/30/2022    MAG 2.3 01/28/2022    CR 1.22 01/30/2022    TRE 8.5 01/30/2022    BILITOTAL 0.5 01/28/2022    ALBUMIN 3.8 01/28/2022    ALT 39 01/28/2022    AST 17 01/28/2022         Post Infusion Assessment:  Patient tolerated infusion without incident.  Blood return noted pre and post infusion.  Access discontinued per protocol.       Discharge Plan:   Patient discharged in stable condition accompanied by: wife.  Departure Mode: Ambulatory.      Apryl Mayorga RN

## 2022-01-30 NOTE — PROGRESS NOTES
"BMT/Cell Therapy Daily Progress Note      Patient ID:  Juvenal Blake is a 56 year old male, currently day +14 s/p Yescarta CAR-T.      Diagnosis NHLFL Non-Hodgkin lymphoma, Follicular, predominantly large cell  BMTCT Type Cellular Therapy    Prep Regimen Fludarabine, Cytoxan  Donor Source Autologous     GVHD Prophylaxis NA  Primary BMT MD Dr. Irwin    Clinical Trials   2017-45         Admission date: 1/17/2022     INTERVAL  HISTORY     HPI:  Juvenal is feeling well, better, no new complains.   has 'Crooked\" neck but not worse, slept well, his mentation is normal, eating well.   Lump in R inguinal area is almost gone. No fevers, no chills, he is dizzy first as he gets up but it does not stay.    No N/V/D. No cough.  Eating okay.  Still ongoing abdominal discomfort with riding in the car or when his cat jumps up on him.     Review of Systems: 8 point ROS negative except as noted above.        PHYSICAL EXAM      KPS:  80  BP (!) 148/82 (BP Location: Right arm, Patient Position: Sitting, Cuff Size: Adult Large)   Pulse 70   Temp 97.9  F (36.6  C) (Oral)   Resp 18   Wt 104.5 kg (230 lb 4.8 oz)   SpO2 98%   BMI 35.52 kg/m       Wt Readings from Last 4 Encounters:   01/30/22 104.5 kg (230 lb 4.8 oz)   01/29/22 104.6 kg (230 lb 11.2 oz)   01/28/22 102.5 kg (226 lb)   01/27/22 103.1 kg (227 lb 4.8 oz)     General: NAD, quiet,    Eyes: sclera anicteric   Lungs: CTA bilaterally  Cardiovascular: RRR, no M/R/G   Abdominal/Rectal: +BS, soft, chronic diffuse tenderness to palpation without rebound or guarding.    Lymphatics: no edema, no axillary or inguinal adenopathy   Skin: No rash  Neuro: non-focal  Access:  R chest PAC.     ICE 10/10 1/30 sentence log now in clinic file cabinet (2E/F collab space)     LABS AND IMAGING: I have assessed all abnormal lab values for their clinical significance and any values considered clinically significant have been addressed in the assessment and plan.       Lab Results   Component " Value Date    WBC 1.4 (L) 01/30/2022    ANEU 0.4 (LL) 01/30/2022    HGB 9.2 (L) 01/30/2022    HCT 27.3 (L) 01/30/2022    PLT 90 (L) 01/30/2022     01/30/2022    POTASSIUM 4.2 01/30/2022    CHLORIDE 107 01/30/2022    CO2 27 01/30/2022     (H) 01/30/2022    BUN 16 01/30/2022    CR 1.22 01/30/2022    MAG 2.3 01/28/2022    INR 1.03 01/28/2022    AST 17 01/28/2022    ALT 39 01/28/2022       Juvenal Blake is a 56 year old male PMH significant for refractory NHL, Day +14 of Yescarta CAR-T therapy     1. Follicular Lymphoma/Yescarta CAR-T:  Day 0 Yescarta CAR-T 1/17/22  Relapsed after BR, [facility], [facility] NK (9/1/20), idelalisib (~4-6/2020),  (6/28/21), polatuzumab (12/21/21). (hx Rituxan reaction).   - LD chemo with fludarabine/Cytoxan 1/12-1/14/22 prior to CAR-T infusion.    - cont allopurinol  CRP=8 1/24, next check 1/28  ICE 10/10 to date. Daily CAR-Tox note through at least D14.  Ok to spread appt to 2x week next week      2. HEME: Keep hgb >7g, plt>10.  #pancytopenia 2/2 chemo/lymphoma. Not requiring transfusions.      3. ID: Afebrile.  #Prophy: acyclovir, fluconazole, cefdinir (tendon pain with levaquin). Inhaled Pentamidine 1/13/21.     #Hx C diff colitis. Screening C.dif + on admission. Asymptomatic. If develops diarrhea, will Rx Vanco.     #hx parainfluenza 3 (12/21/21): Occasional cough, otherwise sx improved.  #hx rhinovirus (10/6/21).      #S/p 3 covid shots; first two were pre- and booster was after.  No need for vaccination  post CART given her did not have auto-trasplant .     Plan evusheld around day 28. wait until he is out of this acute Car-T period.     4. GI:    #JACQUE: compazine prn. Ativan available also  - cont PTA omeprazole  -  hx medical cannibis.  #Abdominal Pain secondary to cancer. Intermittent.      5. Renal/FEN: Creat, lytes wnl.  - replete prn per sliding scale  #severe malnutrition 2/2 chronic illness/cancer (based on wt loss). Use ensure/ high calorie snacks while intake  is at/below 50%.  He reports 1/28 that he is eating pretty well.   - avoid NSAIDS  - slight bump in creat - will give 1 L NS today      6. CV:    Cardiology cleared him to proceed with no further testing. The CT angiogram showed no lesion requiring stenting or bypass.     7. :   #hx Prostatic adenocarcinoma: s/p radical prostatectomy and bilateral lymph node dissection 11/2017. Completed radiation to the prostate fossa and pelvic lymph nodes at [facility] early May 2018. He received 4500 cGy in 25 fractions. He then had 2340 cGy boost in 13 fractions to the prostatic fossa, for a total dose of 6240 cGy in 38 treatment fractions delivered over 54 days. He did not receive hormonal therapy:    9/10 PSA: 0.71 - no concerns.   - Repeat PSA drawn on 1/25 per request of urology; 1.21 (not elevated, but higher than last check in September)       8. Muscle/tendon aches:  Could be related to recent levaquin?  Tramadol prn, if tylenol is not adequate.  Avoid NSAIDS.  Considered giving GCSF for neutropenia, today, but will avoid as he is already in discomfort and he has no active infections.     RTC: Daily visits through day 14 for serafinangeli joshal  Ok to spread to 2-3 times a week this week     I spent 29 minutes in the care of this patient today, which included time necessary for preparation for the visit, obtaining history, ordering medications/tests/procedures as medically indicated, review of pertinent medical literature, counseling of the patient, communication of recommendations to the care team, and documentation time.    Rosa Terry MD  Professor of Medicine  [phone]

## 2022-01-30 NOTE — PROGRESS NOTES
BMT Daily CARTOX Note (complete days 0-14 and with any subsequent CRS/neurotoxicity)    01/30/22      Juvenal Blake ([id number]) is a 56 year old year old male who received infusion on 1/17/2022, currently day +14 of CAR-T cell therapy Yescarta    Vital Signs: BP (!) 148/82 (BP Location: Right arm, Patient Position: Sitting, Cuff Size: Adult Large)   Pulse 70   Temp 97.9  F (36.6  C) (Oral)   Resp 18   Wt 104.5 kg (230 lb 4.8 oz)   SpO2 98%   BMI 35.52 kg/m      1) CRS Grading (based ONLY on parameters in box below)    Fever:   </= 100.4    Blood pressure:   SBP >/= 90 (not hypotensive)    Oxygen saturation:    >/= 90% on room air (not hypoxic)    CRS Parameter Grade 1  Grade 2 Grade 3 Grade 4   Fever* Tm >= 100.4 degrees F Tm >= 100.4 degrees F Tm >= 100.4 degrees F Tm >= to 100.4  degrees F      With Either:  Hypotension (SBP <90) None Responsive to Fluids  Requiring 1  vasopressor (w/ or w/o vasopressin) Requiring multiple  vasopressors  (excluding  vasopressin)     And/or  Hypoxia (O2 sats <90% on room air) None Low-flow nasal  cannula or blow-by High-flow nasal  cannula, face mask,  non-rebreather mask,or Venturi mask  Requiring positive  pressure (CPAP,  BiPAP intubation and  mechanical  ventilation)     CRS Summary  Does patient have CRS? No  Current CRS stgstrstastdstest:st st1st What therapy was given:n/a  Has CRS grade changed? n/a or No  Has CRS resolved? N/a       2) Neurotoxicity:    ICE Assessment  Orientation to year, month, city, hospital: 4 points, Name 3 objects (e.g., point to clock, pen, button): 3 point, Following commands: (e.g., Show me 2 fingers): 1 point, Write a standard sentence (e.g., The tang jumped over the log): 1 point and Count backwards from 100 by ten: 1 point  Total points: 10: No impairment    ASTCT ICANS Consensus Grading for Adults  ICE Score   10    Seizure   No seizures    Motor Findings   No motor findings    Elevated ICP/Cerebral Edema   None      Neurotoxicity  Domain Grade 1 Grade  2 Grade 3 Grade 4   ICE score 7-9 3-6 1-2 0   Depressed LOC      Awakens  spontaneously Awakens to  voice  Awakens only to  tactile stimulation Unarousable or  requires vigorous  or repetitive  tactile stimuli to  arouse. Stupor  or coma.   Seizure n/a n/a Any clinical  seizure focal or  generalized that  resolves rapidly  or nonconvulsive  seizures on EEG  that resolve with  intervention  Life-threatening  prolonged  seizure (>5 min);  or Repetitive  clinical or  electrical  seizures without  return to baseline  in between    Motor Findings      n/a n/a n/a Deep focal motor  weakness such  as hemiparesis  or paraparesis   Elevated  ICP/cerebral  edema  n/a n/a Focal/local  edema on  neuroimaging  Diffuse cerebral  edema on  neuroimaging;  decerebrate or  decorticate  posturing; or  cranial nerve VI  palsy; or  papilledema; or  Cushing's triad     ICANS grade is determined by the most severe event (ICE score, level of consciousness, seizure, motor findings, raised ICP/cerebral edema) not attributable to any other cause; for example, a patient with an ICE score of 3 who has a generalized seizure is classified as grade 3 ICANS.    A patient with an ICE score of 0 may be classified as grade 3 ICANS if awake with global aphasia, but a patient with an ICE score of 0 may be classified as grade 4 ICANS if unarousable.     Depressed level of consciousness should be attributable to no other cause (eg, no sedating medication)    Tremors and myoclonus associated with immune effector cell therapies may be graded according to CTCAE v5.0,but they do not influence ICANS grading.     Intracranial hemorrhage with or without associated edema is not considered a neurotoxicity feature and is  excluded from ICANS grading. It may be graded according to CTCAE v5.0.          Neurotoxicity Summary  Does patient have neurotoxicity? No  Current Neurotoxicity score (0-10): 0  What therapy was given: n/a  Has neurotoxicity grade changed? n/a or  No  Has neurotoxicity resolved? N/a       3) Organ CAR-T toxicity:    Cardiac   none    Respiratory   none    Gastrointestinal   nausea    Hepatic    none    Skin   none     Coagulopathy    none     Other: muscle/tendon aches      4) HLH   None     * CTCAE grading can be found at   https://ctep.cancer.gov/protocoldevelopment/electronic_applications/docs/CTCAE_v5_Quick Reference_8.5x11.pdf    Rosa Terry MD

## 2022-01-30 NOTE — LETTER
"    1/30/2022         RE: Juvenal Blake  [address] [place] [address]        Dear Colleague,    Thank you for referring your patient, Juvenal Blake, to the [facility] [place]. Please see a copy of my visit note below.    BMT/Cell Therapy Daily Progress Note      Patient ID:  Juvenal Blake is a 56 year old male, currently day +14 s/p [facility]a CAR-T.      Diagnosis NHLFL Non-Hodgkin lymphoma, Follicular, predominantly large cell  BMTCT Type Cellular Therapy    Prep Regimen Fludarabine, Cytoxan  Donor Source Autologous     GVHD Prophylaxis NA  Primary BMT MD Dr. Irwin    Clinical Trials   2017-45         Admission date: 1/17/2022     INTERVAL  HISTORY     HPI:  Juvenal is feeling well, better, no new complains.   has 'Crooked\" neck but not worse, slept well, his mentation is normal, eating well.   Lump in R inguinal area is almost gone. No fevers, no chills, he is dizzy first as he gets up but it does not stay.    No N/V/D. No cough.  Eating okay.  Still ongoing abdominal discomfort with riding in the car or when his cat jumps up on him.     Review of Systems: 8 point ROS negative except as noted above.        PHYSICAL EXAM      KPS:  80  BP (!) 148/82 (BP Location: Right arm, Patient Position: Sitting, Cuff Size: Adult Large)   Pulse 70   Temp 97.9  F (36.6  C) (Oral)   Resp 18   Wt 104.5 kg (230 lb 4.8 oz)   SpO2 98%   BMI 35.52 kg/m       Wt Readings from Last 4 Encounters:   01/30/22 104.5 kg (230 lb 4.8 oz)   01/29/22 104.6 kg (230 lb 11.2 oz)   01/28/22 102.5 kg (226 lb)   01/27/22 103.1 kg (227 lb 4.8 oz)     General: NAD, quiet,    Eyes: sclera anicteric   Lungs: CTA bilaterally  Cardiovascular: RRR, no M/R/G   Abdominal/Rectal: +BS, soft, chronic diffuse tenderness to palpation without rebound or guarding.    Lymphatics: no edema, no axillary or inguinal adenopathy   Skin: No rash  Neuro: non-focal  Access:  R chest PAC.     ICE 10/10 1/30 " sentence log now in clinic file cabinet (2E/F collab space)     LABS AND IMAGING: I have assessed all abnormal lab values for their clinical significance and any values considered clinically significant have been addressed in the assessment and plan.       Lab Results   Component Value Date    WBC 1.4 (L) 01/30/2022    ANEU 0.4 (LL) 01/30/2022    HGB 9.2 (L) 01/30/2022    HCT 27.3 (L) 01/30/2022    PLT 90 (L) 01/30/2022     01/30/2022    POTASSIUM 4.2 01/30/2022    CHLORIDE 107 01/30/2022    CO2 27 01/30/2022     (H) 01/30/2022    BUN 16 01/30/2022    CR 1.22 01/30/2022    MAG 2.3 01/28/2022    INR 1.03 01/28/2022    AST 17 01/28/2022    ALT 39 01/28/2022       Juvenal Blake is a 56 year old male PMH significant for refractory NHL, Day +14 of [facility] CAR-T therapy     1. Follicular Lymphoma/[facility]a CAR-T:  Day 0 Yes[facility]a CAR-T 1/17/22  Relapsed after BR, [facility], NAM NK (9/1/20), idelalisib (~4-6/2020),  (6/28/21), polatuzumab (12/21/21). (hx Rituxan reaction).   - LD chemo with fludarabine/Cytoxan 1/12-1/14/22 prior to CAR-T infusion.    - cont allopurinol  CRP=8 1/24, next check 1/28  ICE 10/10 to date. Daily CAR-Tox note through at least D14.  Ok to spread appt to 2x week next week      2. HEME: Keep hgb >7g, plt>10.  #pancytopenia 2/2 chemo/lymphoma. Not requiring transfusions.      3. ID: Afebrile.  #Prophy: acyclovir, fluconazole, cefdinir (tendon pain with levaquin). Inhaled Pentamidine 1/13/21.     #Hx C diff colitis. Screening C.dif + on admission. Asymptomatic. If develops diarrhea, will Rx Vanco.     #hx parainfluenza 3 (12/21/21): Occasional cough, otherwise sx improved.  #hx rhinovirus (10/6/21).      #S/p 3 covid shots; first two were pre- and booster was after.  No need for vaccination  post CART given her did not have auto-trasplant .     Plan evusheld around day 28. wait until he is out of this acute Car-T period.     4. GI:    #JACQUE: compazine prn. Ativan available also  -  cont PTA omeprazole  -  hx medical cannibis.  #Abdominal Pain secondary to cancer. Intermittent.      5. Renal/FEN: Creat, lytes wnl.  - replete prn per sliding scale  #severe malnutrition 2/2 chronic illness/cancer (based on wt loss). Use ensure/ high calorie snacks while intake is at/below 50%.  He reports 1/28 that he is eating pretty well.   - avoid NSAIDS  - slight bump in creat - will give 1 L NS today      6. CV:    Cardiology cleared him to proceed with no further testing. The CT angiogram showed no lesion requiring stenting or bypass.     7. :   #hx Prostatic adenocarcinoma: s/p radical prostatectomy and bilateral lymph node dissection 11/2017. Completed radiation to the prostate fossa and pelvic lymph nodes at [facility] early May 2018. He received 4500 cGy in 25 fractions. He then had 2340 cGy boost in 13 fractions to the prostatic fossa, for a total dose of 6240 cGy in 38 treatment fractions delivered over 54 days. He did not receive hormonal therapy:    9/10 PSA: 0.71 - no concerns.   - Repeat PSA drawn on 1/25 per request of urology; 1.21 (not elevated, but higher than last check in September)       8. Muscle/tendon aches:  Could be related to recent levaquin?  Tramadol prn, if tylenol is not adequate.  Avoid NSAIDS.  Considered giving GCSF for neutropenia, today, but will avoid as he is already in discomfort and he has no active infections.     RTC: Daily visits through day 14 for cartox nara  Ok to spread to 2-3 times a week this week     I spent 29 minutes in the care of this patient today, which included time necessary for preparation for the visit, obtaining history, ordering medications/tests/procedures as medically indicated, review of pertinent medical literature, counseling of the patient, communication of recommendations to the care team, and documentation time.    Rosa Terry MD  Professor of Medicine  [phone]      BMT Daily CARTOX Note (complete days 0-14 and with any  subsequent CRS/neurotoxicity)    01/30/22      Juvenal Blake ([id number]) is a 56 year old year old male who received infusion on 1/17/2022, currently day +14 of CAR-T cell therapy [facility]carta    Vital Signs: BP (!) 148/82 (BP Location: Right arm, Patient Position: Sitting, Cuff Size: Adult Large)   Pulse 70   Temp 97.9  F (36.6  C) (Oral)   Resp 18   Wt 104.5 kg (230 lb 4.8 oz)   SpO2 98%   BMI 35.52 kg/m      1) CRS Grading (based ONLY on parameters in box below)    Fever:   </= 100.4    Blood pressure:   SBP >/= 90 (not hypotensive)    Oxygen saturation:    >/= 90% on room air (not hypoxic)    CRS Parameter Grade 1  Grade 2 Grade 3 Grade 4   Fever* Tm >= 100.4 degrees F Tm >= 100.4 degrees F Tm >= 100.4 degrees F Tm >= to 100.4  degrees F      With Either:  Hypotension (SBP <90) None Responsive to Fluids  Requiring 1  vasopressor (w/ or w/o vasopressin) Requiring multiple  vasopressors  (excluding  vasopressin)     And/or  Hypoxia (O2 sats <90% on room air) None Low-flow nasal  cannula or blow-by High-flow nasal  cannula, face mask,  non-rebreather mask,or Venturi mask  Requiring positive  pressure (CPAP,  BiPAP intubation and  mechanical  ventilation)     CRS Summary  Does patient have CRS? No  Current CRS stgstrstastdstest:st st1st What therapy was given:n/a  Has CRS grade changed? n/a or No  Has CRS resolved? N/a       2) Neurotoxicity:    ICE Assessment  Orientation to year, month, city, hospital: 4 points, Name 3 objects (e.g., point to clock, pen, button): 3 point, Following commands: (e.g., Show me 2 fingers): 1 point, Write a standard sentence (e.g., The tang jumped over the log): 1 point and Count backwards from 100 by ten: 1 point  Total points: 10: No impairment    ASTCT ICANS Consensus Grading for Adults  ICE Score   10    Seizure   No seizures    Motor Findings   No motor findings    Elevated ICP/Cerebral Edema   None      Neurotoxicity  Domain Grade 1 Grade 2 Grade 3 Grade 4   ICE score 7-9 3-6 1-2 0   Depressed  LOC      Awakens  spontaneously Awakens to  voice  Awakens only to  tactile stimulation Unarousable or  requires vigorous  or repetitive  tactile stimuli to  arouse. Stupor  or coma.   Seizure n/a n/a Any clinical  seizure focal or  generalized that  resolves rapidly  or nonconvulsive  seizures on EEG  that resolve with  intervention  Life-threatening  prolonged  seizure (>5 min);  or Repetitive  clinical or  electrical  seizures without  return to baseline  in between    Motor Findings      n/a n/a n/a Deep focal motor  weakness such  as hemiparesis  or paraparesis   Elevated  ICP/cerebral  edema  n/a n/a Focal/local  edema on  neuroimaging  Diffuse cerebral  edema on  neuroimaging;  decerebrate or  decorticate  posturing; or  cranial nerve VI  palsy; or  papilledema; or  Cushing's triad     ICANS grade is determined by the most severe event (ICE score, level of consciousness, seizure, motor findings, raised ICP/cerebral edema) not attributable to any other cause; for example, a patient with an ICE score of 3 who has a generalized seizure is classified as grade 3 ICANS.    A patient with an ICE score of 0 may be classified as grade 3 ICANS if awake with global aphasia, but a patient with an ICE score of 0 may be classified as grade 4 ICANS if unarousable.     Depressed level of consciousness should be attributable to no other cause (eg, no sedating medication)    Tremors and myoclonus associated with immune effector cell therapies may be graded according to CTCAE v5.0,but they do not influence ICANS grading.     Intracranial hemorrhage with or without associated edema is not considered a neurotoxicity feature and is  excluded from ICANS grading. It may be graded according to CTCAE v5.0.          Neurotoxicity Summary  Does patient have neurotoxicity? No  Current Neurotoxicity score (0-10): 0  What therapy was given: n/a  Has neurotoxicity grade changed? n/a or No  Has neurotoxicity resolved? N/a       3) Organ  CAR-T toxicity:    Cardiac   none    Respiratory   none    Gastrointestinal   nausea    Hepatic    none    Skin   none     Coagulopathy    none     Other: muscle/tendon aches      4) HLH   None     * CTCAE grading can be found at   https://ctep.cancer.gov/protocoldevelopment/electronic_applications/docs/CTCAE_v5_Quick Reference_8.5x11.pdf    Rosa Terry MD          Again, thank you for allowing me to participate in the care of your patient.      Sincerely,    BMT DOM

## 2022-01-30 NOTE — NURSING NOTE
"Oncology Rooming Note    January 30, 2022 7:51 AM   Juvenal Blake is a 56 year old male who presents for:    Chief Complaint   Patient presents with     Port Draw     Labs drawn via port by RN in lab. VS taken.      Initial Vitals: BP (!) 148/82 (BP Location: Right arm, Patient Position: Sitting, Cuff Size: Adult Large)   Pulse 70   Temp 97.9  F (36.6  C) (Oral)   Resp 18   Wt 104.5 kg (230 lb 4.8 oz)   SpO2 98%   BMI 35.52 kg/m   Estimated body mass index is 35.52 kg/m  as calculated from the following:    Height as of 1/17/22: 1.715 m (5' 7.52\").    Weight as of this encounter: 104.5 kg (230 lb 4.8 oz). Body surface area is 2.23 meters squared.  Moderate Pain (4) Comment: Data Unavailable   No LMP for male patient.  Allergies reviewed: Yes  Medications reviewed: Yes    Medications: Medication refills not needed today.  Pharmacy name entered into [employer]: [id number] - [place], [place] - [address] AT [facility]    Clinical concerns: none       Apryl Mayorga, VALERIE            "

## 2022-01-31 ENCOUNTER — ONCOLOGY VISIT (OUTPATIENT)
Dept: TRANSPLANT | Facility: CLINIC | Age: 57
End: 2022-01-31
Payer: COMMERCIAL

## 2022-01-31 ENCOUNTER — LAB (OUTPATIENT)
Dept: LAB | Facility: CLINIC | Age: 57
End: 2022-01-31
Payer: COMMERCIAL

## 2022-01-31 VITALS
WEIGHT: 228.1 LBS | TEMPERATURE: 98 F | RESPIRATION RATE: 16 BRPM | SYSTOLIC BLOOD PRESSURE: 130 MMHG | OXYGEN SATURATION: 96 % | HEART RATE: 68 BPM | DIASTOLIC BLOOD PRESSURE: 74 MMHG | BODY MASS INDEX: 35.18 KG/M2

## 2022-01-31 DIAGNOSIS — C82.00 FOLLICULAR LYMPHOMA GRADE I, UNSPECIFIED BODY REGION (H): ICD-10-CM

## 2022-01-31 DIAGNOSIS — C82.08 FOLLICULAR LYMPHOMA GRADE I, LYMPH NODES OF MULTIPLE SITES (H): Primary | ICD-10-CM

## 2022-01-31 DIAGNOSIS — C82.08 FOLLICULAR LYMPHOMA GRADE I, LYMPH NODES OF MULTIPLE SITES (H): ICD-10-CM

## 2022-01-31 LAB
ALBUMIN SERPL-MCNC: 3.7 G/DL (ref 3.4–5)
ALP SERPL-CCNC: 99 U/L (ref 40–150)
ALT SERPL W P-5'-P-CCNC: 34 U/L (ref 0–70)
ANION GAP SERPL CALCULATED.3IONS-SCNC: 4 MMOL/L (ref 3–14)
APTT PPP: 81 SECONDS (ref 22–38)
AST SERPL W P-5'-P-CCNC: 21 U/L (ref 0–45)
BASOPHILS # BLD MANUAL: 0 10E3/UL (ref 0–0.2)
BASOPHILS NFR BLD MANUAL: 2 %
BILIRUB SERPL-MCNC: 0.4 MG/DL (ref 0.2–1.3)
BUN SERPL-MCNC: 14 MG/DL (ref 7–30)
CALCIUM SERPL-MCNC: 9 MG/DL (ref 8.5–10.1)
CHLORIDE BLD-SCNC: 109 MMOL/L (ref 94–109)
CO2 SERPL-SCNC: 27 MMOL/L (ref 20–32)
CREAT SERPL-MCNC: 1.06 MG/DL (ref 0.66–1.25)
CRP SERPL-MCNC: 17.3 MG/L (ref 0–8)
D DIMER PPP FEU-MCNC: 0.52 UG/ML FEU (ref 0–0.5)
EOSINOPHIL # BLD MANUAL: 0 10E3/UL (ref 0–0.7)
EOSINOPHIL NFR BLD MANUAL: 2 %
ERYTHROCYTE [DISTWIDTH] IN BLOOD BY AUTOMATED COUNT: 15.1 % (ref 10–15)
FERRITIN SERPL-MCNC: 132 NG/ML (ref 26–388)
FIBRINOGEN PPP-MCNC: 547 MG/DL (ref 170–490)
GFR SERPL CREATININE-BSD FRML MDRD: 81 ML/MIN/1.73M2
GLUCOSE BLD-MCNC: 92 MG/DL (ref 70–99)
HCT VFR BLD AUTO: 28.3 % (ref 40–53)
HGB BLD-MCNC: 9.7 G/DL (ref 13.3–17.7)
INR PPP: 1 (ref 0.85–1.15)
LDH SERPL L TO P-CCNC: 158 U/L (ref 85–227)
LYMPHOCYTES # BLD MANUAL: 0.3 10E3/UL (ref 0.8–5.3)
LYMPHOCYTES NFR BLD MANUAL: 18 %
MAGNESIUM SERPL-MCNC: 2.3 MG/DL (ref 1.6–2.3)
MCH RBC QN AUTO: 33.1 PG (ref 26.5–33)
MCHC RBC AUTO-ENTMCNC: 34.3 G/DL (ref 31.5–36.5)
MCV RBC AUTO: 97 FL (ref 78–100)
MONOCYTES # BLD MANUAL: 0.6 10E3/UL (ref 0–1.3)
MONOCYTES NFR BLD MANUAL: 33 %
NEUTROPHILS # BLD MANUAL: 0.8 10E3/UL (ref 1.6–8.3)
NEUTROPHILS NFR BLD MANUAL: 45 %
PHOSPHATE SERPL-MCNC: 2.4 MG/DL (ref 2.5–4.5)
PLAT MORPH BLD: ABNORMAL
PLATELET # BLD AUTO: 95 10E3/UL (ref 150–450)
POTASSIUM BLD-SCNC: 4.1 MMOL/L (ref 3.4–5.3)
PROT SERPL-MCNC: 6.2 G/DL (ref 6.8–8.8)
RBC # BLD AUTO: 2.93 10E6/UL (ref 4.4–5.9)
RBC MORPH BLD: ABNORMAL
SODIUM SERPL-SCNC: 140 MMOL/L (ref 133–144)
URATE SERPL-MCNC: 3.4 MG/DL (ref 3.5–7.2)
WBC # BLD AUTO: 1.7 10E3/UL (ref 4–11)

## 2022-01-31 PROCEDURE — G0463 HOSPITAL OUTPT CLINIC VISIT: HCPCS | Mod: 25

## 2022-01-31 PROCEDURE — 85027 COMPLETE CBC AUTOMATED: CPT

## 2022-01-31 PROCEDURE — 99213 OFFICE O/P EST LOW 20 MIN: CPT

## 2022-01-31 PROCEDURE — 250N000011 HC RX IP 250 OP 636: Performed by: STUDENT IN AN ORGANIZED HEALTH CARE EDUCATION/TRAINING PROGRAM

## 2022-01-31 PROCEDURE — 85384 FIBRINOGEN ACTIVITY: CPT

## 2022-01-31 PROCEDURE — 85730 THROMBOPLASTIN TIME PARTIAL: CPT

## 2022-01-31 PROCEDURE — 85379 FIBRIN DEGRADATION QUANT: CPT

## 2022-01-31 PROCEDURE — 82728 ASSAY OF FERRITIN: CPT

## 2022-01-31 PROCEDURE — 80053 COMPREHEN METABOLIC PANEL: CPT

## 2022-01-31 PROCEDURE — 250N000011 HC RX IP 250 OP 636

## 2022-01-31 PROCEDURE — 85610 PROTHROMBIN TIME: CPT

## 2022-01-31 PROCEDURE — 84550 ASSAY OF BLOOD/URIC ACID: CPT

## 2022-01-31 PROCEDURE — 83615 LACTATE (LD) (LDH) ENZYME: CPT

## 2022-01-31 PROCEDURE — 83735 ASSAY OF MAGNESIUM: CPT

## 2022-01-31 PROCEDURE — 36591 DRAW BLOOD OFF VENOUS DEVICE: CPT

## 2022-01-31 PROCEDURE — 86140 C-REACTIVE PROTEIN: CPT

## 2022-01-31 PROCEDURE — 96372 THER/PROPH/DIAG INJ SC/IM: CPT | Performed by: STUDENT IN AN ORGANIZED HEALTH CARE EDUCATION/TRAINING PROGRAM

## 2022-01-31 PROCEDURE — 84100 ASSAY OF PHOSPHORUS: CPT

## 2022-01-31 RX ORDER — HEPARIN SODIUM (PORCINE) LOCK FLUSH IV SOLN 100 UNIT/ML 100 UNIT/ML
5 SOLUTION INTRAVENOUS
Status: CANCELLED | OUTPATIENT
Start: 2022-01-31

## 2022-01-31 RX ORDER — HEPARIN SODIUM (PORCINE) LOCK FLUSH IV SOLN 100 UNIT/ML 100 UNIT/ML
5 SOLUTION INTRAVENOUS ONCE
Status: COMPLETED | OUTPATIENT
Start: 2022-01-31 | End: 2022-01-31

## 2022-01-31 RX ORDER — HEPARIN SODIUM,PORCINE 10 UNIT/ML
5 VIAL (ML) INTRAVENOUS
Status: CANCELLED | OUTPATIENT
Start: 2022-01-31

## 2022-01-31 RX ADMIN — FILGRASTIM 480 MCG: 480 INJECTION, SOLUTION INTRAVENOUS; SUBCUTANEOUS at 11:06

## 2022-01-31 RX ADMIN — Medication 5 ML: at 09:18

## 2022-01-31 ASSESSMENT — PAIN SCALES - GENERAL: PAINLEVEL: NO PAIN (0)

## 2022-01-31 NOTE — NURSING NOTE
Infusion Nursing Note:  Juvenal RACHELE Rioskenneyrachele presents today for add-on injection.    Patient seen by provider today: Yes: Narcisa   present during visit today: Not Applicable.    Note: Patient received neupogen per therapy plan.      Intravenous Access:  Labs drawn without difficulty.    Treatment Conditions:  Results reviewed, labs MET treatment parameters, ok to proceed with treatment.      Post Infusion Assessment:  Patient tolerated injection without incident.       Discharge Plan:   Patient and/or family verbalized understanding of discharge instructions and all questions answered.      Ludivina Aquino RN

## 2022-01-31 NOTE — PROGRESS NOTES
BMT/Cell Therapy Daily Progress Note      Patient ID:  Juvenal Blake is a 56 year old male, currently day +14 s/p [facility]a CAR-T.      Diagnosis NHLFL Non-Hodgkin lymphoma, Follicular, predominantly large cell  BMTCT Type Cellular Therapy    Prep Regimen Fludarabine, Cytoxan  Donor Source Autologous     GVHD Prophylaxis NA  Primary BMT MD Dr. Irwin    Clinical Trials   2017-45         Admission date: 1/17/2022     INTERVAL  HISTORY     HPI: Juvenal has persistent discomfort in his neck today, difficult to turn to look to either side, especially right side. He denies any shooting pain down his arms or spine. Baseline headaches actually a little improved this weekend. No vision changes. No fevers. He would like to go to the chiropractor for this. Went last week, but adjustment made it a little worse.  No cough. Eating well without n/v/d. Still ongoing abdominal discomfort with riding in the car or when his cat jumps up on him.     Review of Systems: 8 point ROS negative except as noted above.        PHYSICAL EXAM      KPS:  80  /74 (BP Location: Right arm, Patient Position: Sitting, Cuff Size: Adult Regular)   Pulse 68   Temp 98  F (36.7  C) (Oral)   Resp 16   Wt 103.5 kg (228 lb 1.6 oz)   SpO2 96%   BMI 35.18 kg/m       Wt Readings from Last 4 Encounters:   01/31/22 103.5 kg (228 lb 1.6 oz)   01/30/22 104.5 kg (230 lb 4.8 oz)   01/29/22 104.6 kg (230 lb 11.2 oz)   01/28/22 102.5 kg (226 lb)     General: NAD, quiet  Eyes: sclera anicteric   Lungs: CTA bilaterally  Cardiovascular: RRR, no M/R/G   Abdominal/Rectal: +BS, soft, chronic diffuse tenderness to palpation without rebound or guarding.    Lymphatics: no edema, no axillary or inguinal adenopathy   Skin: No rash  Neuro: CN II-XII intact without focal deficits. Moving neck slowly but 50% ROM good  Access:  R chest PAC.     ICE 10/10 1/31 sentence log     LABS AND IMAGING: I have assessed all abnormal lab values for their clinical significance and any  values considered clinically significant have been addressed in the assessment and plan.       Lab Results   Component Value Date    WBC 1.7 (L) 01/31/2022    ANEU 0.8 (L) 01/31/2022    HGB 9.7 (L) 01/31/2022    HCT 28.3 (L) 01/31/2022    PLT 95 (L) 01/31/2022     01/31/2022    POTASSIUM 4.1 01/31/2022    CHLORIDE 109 01/31/2022    CO2 27 01/31/2022    GLC 92 01/31/2022    BUN 14 01/31/2022    CR 1.06 01/31/2022    MAG 2.3 01/31/2022    INR 1.00 01/31/2022    AST 21 01/31/2022    ALT 34 01/31/2022       Juvenal Blake is a 56 year old male PMH significant for refractory NHL, Day +14 of Yes[facility]a CAR-T therapy     1. Follicular Lymphoma/[facility]a CAR-T:  Day 0 Yes[facility]a CAR-T 1/17/22  Relapsed after BR, [facility], NAM NK (9/1/20), idelalisib (~4-6/2020),  (6/28/21), polatuzumab (12/21/21). (hx Rituxan reaction).   - LD chemo with fludarabine/Cytoxan 1/12-1/14/22 prior to CAR-T infusion.    - cont allopurinol  CRP=8 1/24, next check 1/28  ICE 10/10 to date. Daily CAR-Tox note through day 14 completed today  Visits to bi-weekly     2. HEME: Keep hgb >7g, plt>10.  #pancytopenia 2/2 chemo/lymphoma. Not requiring transfusions.      3. ID: Afebrile.  #Prophy: acyclovir, fluconazole, cefdinir (tendon pain with levaquin). Inhaled Pentamidine 1/13/21.    #Hx C diff colitis. Screening C.dif + on admission. Asymptomatic. If develops diarrhea, will Rx Vanco.     #hx parainfluenza 3 (12/21/21): Occasional cough, otherwise sx improved.  #hx rhinovirus (10/6/21).      #S/p 3 covid shots; first two were pre- and booster was after.  No need for vaccination  post CART given her did not have auto-trasplant .     Plan evusheld around day 28. wait until he is out of this acute Car-T period.     4. GI:    #JACQUE: compazine prn. Ativan available also  - cont PTA omeprazole  - hx medical cannibis  #Abdominal Pain secondary to cancer. Intermittent.      5. Renal/FEN: Creat, lytes wnl.  #severe malnutrition 2/2 chronic illness/cancer  (based on wt loss). Use ensure/ high calorie snacks while intake is at/below 50%.  He reports 1/28 that he is eating pretty well.   - avoid NSAIDS     6. CV:    Cardiology cleared him to proceed with no further testing. The CT angiogram showed no lesion requiring stenting or bypass.     7. :   #hx Prostatic adenocarcinoma: s/p radical prostatectomy and bilateral lymph node dissection 11/2017. Completed radiation to the prostate fossa and pelvic lymph nodes at [facility] early May 2018. He received 4500 cGy in 25 fractions. He then had 2340 cGy boost in 13 fractions to the prostatic fossa, for a total dose of 6240 cGy in 38 treatment fractions delivered over 54 days. He did not receive hormonal therapy:    9/10 PSA: 0.71 - no concerns.   - Repeat PSA drawn on 1/25 per request of urology; 1.21 (not elevated, but higher than last check in September)       8. Muscle/tendon aches:  Could be related to recent levaquin? Tramadol prn, if tylenol is not adequate. Avoid NSAIDS.   Saw chiro, suggested no adjustments at this time as it was not helpful previously. No acupuncture due to neutropenia. Schedule MRI if possible Thursday for after visit, if improved could cancel      Plan: gcsf today  RTC: Thursday for followup, sooner if neck more stiff, pain, or radiating pain    I spent 20 minutes in the care of this patient today, which included time necessary for preparation for the visit, obtaining history, ordering medications/tests/procedures as medically indicated, review of pertinent medical literature, counseling of the patient, communication of recommendations to the care team, and documentation time.    Narcisa Stewart PAC  [phone]

## 2022-01-31 NOTE — PROGRESS NOTES
BMT Daily CARTOX Note (complete days 0-14 and with any subsequent CRS/neurotoxicity)    01/31/22      Juvenal Blake ([id number]) is a 56 year old year old male who received infusion on 1/17/2022, currently day +14 of CAR-T cell therapy Yescarta    Vital Signs: /74 (BP Location: Right arm, Patient Position: Sitting, Cuff Size: Adult Regular)   Pulse 68   Temp 98  F (36.7  C) (Oral)   Resp 16   Wt 103.5 kg (228 lb 1.6 oz)   SpO2 96%   BMI 35.18 kg/m      1) CRS Grading (based ONLY on parameters in box below)    Fever:   </= 100.4    Blood pressure:   SBP >/= 90 (not hypotensive)    Oxygen saturation:    >/= 90% on room air (not hypoxic)    CRS Parameter Grade 1  Grade 2 Grade 3 Grade 4   Fever* Tm >= 100.4 degrees F Tm >= 100.4 degrees F Tm >= 100.4 degrees F Tm >= to 100.4  degrees F      With Either:  Hypotension (SBP <90) None Responsive to Fluids  Requiring 1  vasopressor (w/ or w/o vasopressin) Requiring multiple  vasopressors  (excluding  vasopressin)     And/or  Hypoxia (O2 sats <90% on room air) None Low-flow nasal  cannula or blow-by High-flow nasal  cannula, face mask,  non-rebreather mask,or Venturi mask  Requiring positive  pressure (CPAP,  BiPAP intubation and  mechanical  ventilation)     CRS Summary  Does patient have CRS? No  Current CRS stgstrstastdstest:st st1st What therapy was given:n/a  Has CRS grade changed? n/a or No  Has CRS resolved? N/a       2) Neurotoxicity:    ICE Assessment  Orientation to year, month, city, hospital: 4 points, Name 3 objects (e.g., point to clock, pen, button): 3 point, Following commands: (e.g., Show me 2 fingers): 1 point, Write a standard sentence (e.g., The tang jumped over the log): 1 point and Count backwards from 100 by ten: 1 point  Total points: 10: No impairment    ASTCT ICANS Consensus Grading for Adults  ICE Score   10    Seizure   No seizures    Motor Findings   No motor findings    Elevated ICP/Cerebral Edema   None      Neurotoxicity  Domain Grade 1 Grade 2  Grade 3 Grade 4   ICE score 7-9 3-6 1-2 0   Depressed LOC      Awakens  spontaneously Awakens to  voice  Awakens only to  tactile stimulation Unarousable or  requires vigorous  or repetitive  tactile stimuli to  arouse. Stupor  or coma.   Seizure n/a n/a Any clinical  seizure focal or  generalized that  resolves rapidly  or nonconvulsive  seizures on EEG  that resolve with  intervention  Life-threatening  prolonged  seizure (>5 min);  or Repetitive  clinical or  electrical  seizures without  return to baseline  in between    Motor Findings      n/a n/a n/a Deep focal motor  weakness such  as hemiparesis  or paraparesis   Elevated  ICP/cerebral  edema  n/a n/a Focal/local  edema on  neuroimaging  Diffuse cerebral  edema on  neuroimaging;  decerebrate or  decorticate  posturing; or  cranial nerve VI  palsy; or  papilledema; or  Cushing's triad     ICANS grade is determined by the most severe event (ICE score, level of consciousness, seizure, motor findings, raised ICP/cerebral edema) not attributable to any other cause; for example, a patient with an ICE score of 3 who has a generalized seizure is classified as grade 3 ICANS.    A patient with an ICE score of 0 may be classified as grade 3 ICANS if awake with global aphasia, but a patient with an ICE score of 0 may be classified as grade 4 ICANS if unarousable.     Depressed level of consciousness should be attributable to no other cause (eg, no sedating medication)    Tremors and myoclonus associated with immune effector cell therapies may be graded according to CTCAE v5.0,but they do not influence ICANS grading.     Intracranial hemorrhage with or without associated edema is not considered a neurotoxicity feature and is  excluded from ICANS grading. It may be graded according to CTCAE v5.0.          Neurotoxicity Summary  Does patient have neurotoxicity? No  Current Neurotoxicity score (0-10): 0  What therapy was given: n/a  Has neurotoxicity grade changed? n/a or  No  Has neurotoxicity resolved? N/a       3) Organ CAR-T toxicity:    Cardiac   none    Respiratory   none    Gastrointestinal   nausea    Hepatic    none    Skin   none     Coagulopathy    none     Other: muscle/tendon aches      4) HLH   None     * CTCAE grading can be found at   https://ctep.cancer.gov/protocoldevelopment/electronic_applications/docs/CTCAE_v5_Quick Reference_8.5x11.pdf    Narcisa Stewart [facility]  [phone]

## 2022-01-31 NOTE — LETTER
1/31/2022         RE: Juvenal Blake  [address] [place] [address]        Dear Colleague,    Thank you for referring your patient, Juvenal Blake, to the [facility] BLOOD AND MARROW TRANSPLANT PROGRAM [place]. Please see a copy of my visit note below.    BMT/Cell Therapy Daily Progress Note      Patient ID:  Juvenal Blake is a 56 year old male, currently day +14 s/p Yescarta CAR-T.      Diagnosis NHLFL Non-Hodgkin lymphoma, Follicular, predominantly large cell  BMTCT Type Cellular Therapy    Prep Regimen Fludarabine, Cytoxan  Donor Source Autologous     GVHD Prophylaxis NA  Primary BMT MD Dr. Irwin    Clinical Trials   2017-45         Admission date: 1/17/2022     INTERVAL  HISTORY     HPI: Juvenal has persistent discomfort in his neck today, difficult to turn to look to either side, especially right side. He denies any shooting pain down his arms or spine. Baseline headaches actually a little improved this weekend. No vision changes. No fevers. He would like to go to the chiropractor for this. Went last week, but adjustment made it a little worse.  No cough. Eating well without n/v/d. Still ongoing abdominal discomfort with riding in the car or when his cat jumps up on him.     Review of Systems: 8 point ROS negative except as noted above.        PHYSICAL EXAM      KPS:  80  /74 (BP Location: Right arm, Patient Position: Sitting, Cuff Size: Adult Regular)   Pulse 68   Temp 98  F (36.7  C) (Oral)   Resp 16   Wt 103.5 kg (228 lb 1.6 oz)   SpO2 96%   BMI 35.18 kg/m       Wt Readings from Last 4 Encounters:   01/31/22 103.5 kg (228 lb 1.6 oz)   01/30/22 104.5 kg (230 lb 4.8 oz)   01/29/22 104.6 kg (230 lb 11.2 oz)   01/28/22 102.5 kg (226 lb)     General: NAD, quiet  Eyes: sclera anicteric   Lungs: CTA bilaterally  Cardiovascular: RRR, no M/R/G   Abdominal/Rectal: +BS, soft, chronic diffuse tenderness to palpation without rebound or guarding.    Lymphatics: no edema, no axillary  or inguinal adenopathy   Skin: No rash  Neuro: CN II-XII intact without focal deficits. Moving neck slowly but 50% ROM good  Access:  R chest PAC.     ICE 10/10 1/31 sentence log     LABS AND IMAGING: I have assessed all abnormal lab values for their clinical significance and any values considered clinically significant have been addressed in the assessment and plan.       Lab Results   Component Value Date    WBC 1.7 (L) 01/31/2022    ANEU 0.8 (L) 01/31/2022    HGB 9.7 (L) 01/31/2022    HCT 28.3 (L) 01/31/2022    PLT 95 (L) 01/31/2022     01/31/2022    POTASSIUM 4.1 01/31/2022    CHLORIDE 109 01/31/2022    CO2 27 01/31/2022    GLC 92 01/31/2022    BUN 14 01/31/2022    CR 1.06 01/31/2022    MAG 2.3 01/31/2022    INR 1.00 01/31/2022    AST 21 01/31/2022    ALT 34 01/31/2022       Juvenal Blake is a 56 year old male PMH significant for refractory NHL, Day +14 of Yescarta CAR-T therapy     1. Follicular Lymphoma/Yescarta CAR-T:  Day 0 Yescarta CAR-T 1/17/22  Relapsed after BR, [facility], NAM NK (9/1/20), idelalisib (~4-6/2020),  (6/28/21), polatuzumab (12/21/21). (hx Rituxan reaction).   - LD chemo with fludarabine/Cytoxan 1/12-1/14/22 prior to CAR-T infusion.    - cont allopurinol  CRP=8 1/24, next check 1/28  ICE 10/10 to date. Daily CAR-Tox note through day 14 completed today  Visits to bi-weekly     2. HEME: Keep hgb >7g, plt>10.  #pancytopenia 2/2 chemo/lymphoma. Not requiring transfusions.      3. ID: Afebrile.  #Prophy: acyclovir, fluconazole, cefdinir (tendon pain with levaquin). Inhaled Pentamidine 1/13/21.    #Hx C diff colitis. Screening C.dif + on admission. Asymptomatic. If develops diarrhea, will Rx Vanco.     #hx parainfluenza 3 (12/21/21): Occasional cough, otherwise sx improved.  #hx rhinovirus (10/6/21).      #S/p 3 covid shots; first two were pre- and booster was after.  No need for vaccination  post CART given her did not have auto-trasplant .     Plan evusheld around day 28. wait  until he is out of this acute Car-T period.     4. GI:    #JACQUE: compazine prn. Ativan available also  - cont PTA omeprazole  - hx medical cannibis  #Abdominal Pain secondary to cancer. Intermittent.      5. Renal/FEN: Creat lytes wnl.  #severe malnutrition 2/2 chronic illness/cancer (based on wt loss). Use ensure/ high calorie snacks while intake is at/below 50%.  He reports 1/28 that he is eating pretty well.   - avoid NSAIDS     6. CV:    Cardiology cleared him to proceed with no further testing. The CT angiogram showed no lesion requiring stenting or bypass.     7. :   #hx Prostatic adenocarcinoma: s/p radical prostatectomy and bilateral lymph node dissection 11/2017. Completed radiation to the prostate fossa and pelvic lymph nodes at [facility] early May 2018. He received 4500 cGy in 25 fractions. He then had 2340 cGy boost in 13 fractions to the prostatic fossa, for a total dose of 6240 cGy in 38 treatment fractions delivered over 54 days. He did not receive hormonal therapy:    9/10 PSA: 0.71 - no concerns.   - Repeat PSA drawn on 1/25 per request of urology; 1.21 (not elevated, but higher than last check in September)       8. Muscle/tendon aches:  Could be related to recent levaquin? Tramadol prn, if tylenol is not adequate. Avoid NSAIDS.   Saw chiro, suggested no adjustments at this time as it was not helpful previously. No acupuncture due to neutropenia. Schedule MRI if possible Thursday for after visit, if improved could cancel      Plan: gcsf today  RTC: Thursday for followup, sooner if neck more stiff, pain, or radiating pain    I spent 20 minutes in the care of this patient today, which included time necessary for preparation for the visit, obtaining history, ordering medications/tests/procedures as medically indicated, review of pertinent medical literature, counseling of the patient, communication of recommendations to the care team, and documentation time.    Narcisa Stewart  PAC  [phone]    BMT Daily CARTOX Note (complete days 0-14 and with any subsequent CRS/neurotoxicity)    01/31/22      Juvenal Blake ([id number]) is a 56 year old year old male who received infusion on 1/17/2022, currently day +14 of CAR-T cell therapy Yescarta    Vital Signs: /74 (BP Location: Right arm, Patient Position: Sitting, Cuff Size: Adult Regular)   Pulse 68   Temp 98  F (36.7  C) (Oral)   Resp 16   Wt 103.5 kg (228 lb 1.6 oz)   SpO2 96%   BMI 35.18 kg/m      1) CRS Grading (based ONLY on parameters in box below)    Fever:   </= 100.4    Blood pressure:   SBP >/= 90 (not hypotensive)    Oxygen saturation:    >/= 90% on room air (not hypoxic)    CRS Parameter Grade 1  Grade 2 Grade 3 Grade 4   Fever* Tm >= 100.4 degrees F Tm >= 100.4 degrees F Tm >= 100.4 degrees F Tm >= to 100.4  degrees F      With Either:  Hypotension (SBP <90) None Responsive to Fluids  Requiring 1  vasopressor (w/ or w/o vasopressin) Requiring multiple  vasopressors  (excluding  vasopressin)     And/or  Hypoxia (O2 sats <90% on room air) None Low-flow nasal  cannula or blow-by High-flow nasal  cannula, face mask,  non-rebreather mask,or Venturi mask  Requiring positive  pressure (CPAP,  BiPAP intubation and  mechanical  ventilation)     CRS Summary  Does patient have CRS? No  Current CRS stgstrstastdstest:st st1st What therapy was given:n/a  Has CRS grade changed? n/a or No  Has CRS resolved? N/a       2) Neurotoxicity:    ICE Assessment  Orientation to year, month, city, hospital: 4 points, Name 3 objects (e.g., point to clock, pen, button): 3 point, Following commands: (e.g., Show me 2 fingers): 1 point, Write a standard sentence (e.g., The tang jumped over the log): 1 point and Count backwards from 100 by ten: 1 point  Total points: 10: No impairment    ASTCT ICANS Consensus Grading for Adults  ICE Score   10    Seizure   No seizures    Motor Findings   No motor findings    Elevated ICP/Cerebral Edema   None      Neurotoxicity  Domain  Grade 1 Grade 2 Grade 3 Grade 4   ICE score 7-9 3-6 1-2 0   Depressed LOC      Awakens  spontaneously Awakens to  voice  Awakens only to  tactile stimulation Unarousable or  requires vigorous  or repetitive  tactile stimuli to  arouse. Stupor  or coma.   Seizure n/a n/a Any clinical  seizure focal or  generalized that  resolves rapidly  or nonconvulsive  seizures on EEG  that resolve with  intervention  Life-threatening  prolonged  seizure (>5 min);  or Repetitive  clinical or  electrical  seizures without  return to baseline  in between    Motor Findings      n/a n/a n/a Deep focal motor  weakness such  as hemiparesis  or paraparesis   Elevated  ICP/cerebral  edema  n/a n/a Focal/local  edema on  neuroimaging  Diffuse cerebral  edema on  neuroimaging;  decerebrate or  decorticate  posturing; or  cranial nerve VI  palsy; or  papilledema; or  Cushing's triad     ICANS grade is determined by the most severe event (ICE score, level of consciousness, seizure, motor findings, raised ICP/cerebral edema) not attributable to any other cause; for example, a patient with an ICE score of 3 who has a generalized seizure is classified as grade 3 ICANS.    A patient with an ICE score of 0 may be classified as grade 3 ICANS if awake with global aphasia, but a patient with an ICE score of 0 may be classified as grade 4 ICANS if unarousable.     Depressed level of consciousness should be attributable to no other cause (eg, no sedating medication)    Tremors and myoclonus associated with immune effector cell therapies may be graded according to CTCAE v5.0,but they do not influence ICANS grading.     Intracranial hemorrhage with or without associated edema is not considered a neurotoxicity feature and is  excluded from ICANS grading. It may be graded according to CTCAE v5.0.          Neurotoxicity Summary  Does patient have neurotoxicity? No  Current Neurotoxicity score (0-10): 0  What therapy was given: n/a  Has neurotoxicity grade  changed? n/a or No  Has neurotoxicity resolved? N/a       3) Organ CAR-T toxicity:    Cardiac   none    Respiratory   none    Gastrointestinal   nausea    Hepatic    none    Skin   none     Coagulopathy    none     Other: muscle/tendon aches      4) HLH   None     * CTCAE grading can be found at   https://ctep.cancer.gov/protocoldevelopment/electronic_applications/docs/CTCAE_v5_Quick Reference_8.5x11.pdf    Narcisa Stewart PAC  [phone]        Again, thank you for allowing me to participate in the care of your patient.      Sincerely,    BMT Advanced Practice Provider

## 2022-01-31 NOTE — NURSING NOTE
Chief Complaint   Patient presents with     Oncology Clinic Visit     DLBCL (diffuse large B cell lymphoma) (H)     Port Draw     Labs drawn from port by RN in lab. VS taken.      Port accessed with 20g gripper needle by RN, labs collected, line flushed with saline and heparin.  Vitals taken. Pt checked in for appointment(s).  Gold Ruano RN

## 2022-02-01 LAB — CMV DNA SPEC NAA+PROBE-ACNC: NOT DETECTED IU/ML

## 2022-02-03 ENCOUNTER — APPOINTMENT (OUTPATIENT)
Dept: LAB | Facility: CLINIC | Age: 57
End: 2022-02-03
Attending: PHYSICIAN ASSISTANT
Payer: COMMERCIAL

## 2022-02-03 ENCOUNTER — OFFICE VISIT (OUTPATIENT)
Dept: TRANSPLANT | Facility: CLINIC | Age: 57
End: 2022-02-03
Attending: PHYSICIAN ASSISTANT
Payer: COMMERCIAL

## 2022-02-03 VITALS
WEIGHT: 231 LBS | BODY MASS INDEX: 35.62 KG/M2 | DIASTOLIC BLOOD PRESSURE: 71 MMHG | TEMPERATURE: 98.3 F | OXYGEN SATURATION: 98 % | RESPIRATION RATE: 16 BRPM | SYSTOLIC BLOOD PRESSURE: 131 MMHG | HEART RATE: 57 BPM

## 2022-02-03 DIAGNOSIS — C82.08 FOLLICULAR LYMPHOMA GRADE I, LYMPH NODES OF MULTIPLE SITES (H): ICD-10-CM

## 2022-02-03 DIAGNOSIS — C82.00 FOLLICULAR LYMPHOMA GRADE I, UNSPECIFIED BODY REGION (H): ICD-10-CM

## 2022-02-03 LAB
ALBUMIN SERPL-MCNC: 3.7 G/DL (ref 3.4–5)
ALP SERPL-CCNC: 129 U/L (ref 40–150)
ALT SERPL W P-5'-P-CCNC: 56 U/L (ref 0–70)
ANION GAP SERPL CALCULATED.3IONS-SCNC: 6 MMOL/L (ref 3–14)
AST SERPL W P-5'-P-CCNC: 29 U/L (ref 0–45)
BASOPHILS # BLD AUTO: 0 10E3/UL (ref 0–0.2)
BASOPHILS NFR BLD AUTO: 0 %
BILIRUB SERPL-MCNC: 0.4 MG/DL (ref 0.2–1.3)
BUN SERPL-MCNC: 13 MG/DL (ref 7–30)
CALCIUM SERPL-MCNC: 9 MG/DL (ref 8.5–10.1)
CHLORIDE BLD-SCNC: 109 MMOL/L (ref 94–109)
CO2 SERPL-SCNC: 26 MMOL/L (ref 20–32)
CREAT SERPL-MCNC: 1.06 MG/DL (ref 0.66–1.25)
EOSINOPHIL # BLD AUTO: 0 10E3/UL (ref 0–0.7)
EOSINOPHIL NFR BLD AUTO: 1 %
ERYTHROCYTE [DISTWIDTH] IN BLOOD BY AUTOMATED COUNT: 15.4 % (ref 10–15)
GFR SERPL CREATININE-BSD FRML MDRD: 81 ML/MIN/1.73M2
GLUCOSE BLD-MCNC: 99 MG/DL (ref 70–99)
HCT VFR BLD AUTO: 30.5 % (ref 40–53)
HGB BLD-MCNC: 10.1 G/DL (ref 13.3–17.7)
IMM GRANULOCYTES # BLD: 0.1 10E3/UL
IMM GRANULOCYTES NFR BLD: 3 %
LYMPHOCYTES # BLD AUTO: 0.4 10E3/UL (ref 0.8–5.3)
LYMPHOCYTES NFR BLD AUTO: 8 %
MCH RBC QN AUTO: 32.5 PG (ref 26.5–33)
MCHC RBC AUTO-ENTMCNC: 33.1 G/DL (ref 31.5–36.5)
MCV RBC AUTO: 98 FL (ref 78–100)
MONOCYTES # BLD AUTO: 0.8 10E3/UL (ref 0–1.3)
MONOCYTES NFR BLD AUTO: 16 %
NEUTROPHILS # BLD AUTO: 3.7 10E3/UL (ref 1.6–8.3)
NEUTROPHILS NFR BLD AUTO: 72 %
NRBC # BLD AUTO: 0 10E3/UL
NRBC BLD AUTO-RTO: 0 /100
PLATELET # BLD AUTO: 82 10E3/UL (ref 150–450)
POTASSIUM BLD-SCNC: 4.2 MMOL/L (ref 3.4–5.3)
PROT SERPL-MCNC: 6.5 G/DL (ref 6.8–8.8)
RBC # BLD AUTO: 3.11 10E6/UL (ref 4.4–5.9)
SODIUM SERPL-SCNC: 141 MMOL/L (ref 133–144)
WBC # BLD AUTO: 5.1 10E3/UL (ref 4–11)

## 2022-02-03 PROCEDURE — 87486 CHLMYD PNEUM DNA AMP PROBE: CPT | Performed by: PHYSICIAN ASSISTANT

## 2022-02-03 PROCEDURE — U0003 INFECTIOUS AGENT DETECTION BY NUCLEIC ACID (DNA OR RNA); SEVERE ACUTE RESPIRATORY SYNDROME CORONAVIRUS 2 (SARS-COV-2) (CORONAVIRUS DISEASE [COVID-19]), AMPLIFIED PROBE TECHNIQUE, MAKING USE OF HIGH THROUGHPUT TECHNOLOGIES AS DESCRIBED BY CMS-2020-01-R: HCPCS

## 2022-02-03 PROCEDURE — 87633 RESP VIRUS 12-25 TARGETS: CPT | Performed by: PHYSICIAN ASSISTANT

## 2022-02-03 PROCEDURE — 80053 COMPREHEN METABOLIC PANEL: CPT

## 2022-02-03 PROCEDURE — 36591 DRAW BLOOD OFF VENOUS DEVICE: CPT

## 2022-02-03 PROCEDURE — G0463 HOSPITAL OUTPT CLINIC VISIT: HCPCS

## 2022-02-03 PROCEDURE — 250N000011 HC RX IP 250 OP 636: Performed by: PHYSICIAN ASSISTANT

## 2022-02-03 PROCEDURE — 85025 COMPLETE CBC W/AUTO DIFF WBC: CPT

## 2022-02-03 PROCEDURE — G0463 HOSPITAL OUTPT CLINIC VISIT: HCPCS | Mod: 25

## 2022-02-03 PROCEDURE — 99213 OFFICE O/P EST LOW 20 MIN: CPT

## 2022-02-03 RX ORDER — HEPARIN SODIUM (PORCINE) LOCK FLUSH IV SOLN 100 UNIT/ML 100 UNIT/ML
5 SOLUTION INTRAVENOUS
Status: CANCELLED | OUTPATIENT
Start: 2022-02-07

## 2022-02-03 RX ORDER — DIPHENHYDRAMINE HCL 25 MG
50 CAPSULE ORAL ONCE
Status: CANCELLED
Start: 2022-02-07

## 2022-02-03 RX ORDER — HEPARIN SODIUM,PORCINE 10 UNIT/ML
5 VIAL (ML) INTRAVENOUS
Status: CANCELLED | OUTPATIENT
Start: 2022-02-07

## 2022-02-03 RX ORDER — FLUCONAZOLE 100 MG/1
100 TABLET ORAL DAILY
Qty: 30 TABLET | Refills: 1 | Status: SHIPPED | OUTPATIENT
Start: 2022-02-03 | End: 2022-03-17

## 2022-02-03 RX ORDER — MEPERIDINE HYDROCHLORIDE 25 MG/ML
25 INJECTION INTRAMUSCULAR; INTRAVENOUS; SUBCUTANEOUS EVERY 30 MIN PRN
Status: CANCELLED | OUTPATIENT
Start: 2022-02-07

## 2022-02-03 RX ORDER — ACETAMINOPHEN 325 MG/1
650 TABLET ORAL ONCE
Status: CANCELLED
Start: 2022-02-07

## 2022-02-03 RX ORDER — DIPHENHYDRAMINE HYDROCHLORIDE 50 MG/ML
50 INJECTION INTRAMUSCULAR; INTRAVENOUS
Status: CANCELLED
Start: 2022-02-07

## 2022-02-03 RX ORDER — HEPARIN SODIUM (PORCINE) LOCK FLUSH IV SOLN 100 UNIT/ML 100 UNIT/ML
5 SOLUTION INTRAVENOUS EVERY 8 HOURS PRN
Status: DISCONTINUED | OUTPATIENT
Start: 2022-02-03 | End: 2022-02-03 | Stop reason: HOSPADM

## 2022-02-03 RX ORDER — ALBUTEROL SULFATE 90 UG/1
1-2 AEROSOL, METERED RESPIRATORY (INHALATION)
Status: CANCELLED
Start: 2022-02-07

## 2022-02-03 RX ORDER — METHYLPREDNISOLONE SODIUM SUCCINATE 125 MG/2ML
125 INJECTION, POWDER, LYOPHILIZED, FOR SOLUTION INTRAMUSCULAR; INTRAVENOUS
Status: CANCELLED
Start: 2022-02-07

## 2022-02-03 RX ORDER — NALOXONE HYDROCHLORIDE 0.4 MG/ML
0.2 INJECTION, SOLUTION INTRAMUSCULAR; INTRAVENOUS; SUBCUTANEOUS
Status: CANCELLED | OUTPATIENT
Start: 2022-02-07

## 2022-02-03 RX ORDER — ALBUTEROL SULFATE 0.83 MG/ML
2.5 SOLUTION RESPIRATORY (INHALATION)
Status: CANCELLED | OUTPATIENT
Start: 2022-02-07

## 2022-02-03 RX ORDER — EPINEPHRINE 1 MG/ML
0.3 INJECTION, SOLUTION INTRAMUSCULAR; SUBCUTANEOUS EVERY 5 MIN PRN
Status: CANCELLED | OUTPATIENT
Start: 2022-02-07

## 2022-02-03 RX ADMIN — Medication 5 ML: at 09:40

## 2022-02-03 ASSESSMENT — PAIN SCALES - GENERAL: PAINLEVEL: NO PAIN (0)

## 2022-02-03 NOTE — LETTER
2/3/2022         RE: Juvenal Blake  [address] [place] [address]        Dear Colleague,    Thank you for referring your patient, Juvenal Blake, to the [facility] BLOOD AND MARROW TRANSPLANT PROGRAM [place]. Please see a copy of my visit note below.    BMT/Cell Therapy Daily Progress Note      Patient ID:  Juvenal Blake is a 56 year old male, currently day +14 s/p Yescarta CAR-T.      Diagnosis NHLFL Non-Hodgkin lymphoma, Follicular, predominantly large cell  BMTCT Type Cellular Therapy    Prep Regimen Fludarabine, Cytoxan  Donor Source Autologous     GVHD Prophylaxis NA  Primary BMT MD Dr. Irwin    Clinical Trials   2017-45         Admission date: 1/17/2022     INTERVAL  HISTORY     HPI: Jewells neck is still a bit sore in the back, but he has more mobility and it is overall improving.  He has some posterior head/neck ache, some temporal headache, slight runny nose, slight cough and slight sore throat.  His right ear is a little sore.  No GI problems.  No fevers.      Review of Systems: 8 point ROS negative except as noted above.        PHYSICAL EXAM      KPS:  80  /71   Pulse 57   Temp 98.3  F (36.8  C) (Oral)   Resp 16   Wt 104.8 kg (231 lb)   SpO2 98%   BMI 35.62 kg/m       Wt Readings from Last 4 Encounters:   02/03/22 104.8 kg (231 lb)   01/31/22 103.5 kg (228 lb 1.6 oz)   01/30/22 104.5 kg (230 lb 4.8 oz)   01/29/22 104.6 kg (230 lb 11.2 oz)     General: NAD   Eyes: sclera anicteric   Ears: pearly TMs with normal light reflex bilaterally; no erythema, no purulence  Lungs: CTA throughout; coughed once during deep breathing  Cardiovascular: RRR, no M/R/G   Lymphatics: no edema, no cervical/supraclavicular LAD  Skin: No rash  Neuro: CN II-XII intact without focal deficits.    Access:  R chest PAC, not accessed.         LABS AND IMAGING: I have assessed all abnormal lab values for their clinical significance and any values considered clinically significant have been  addressed in the assessment and plan.       Lab Results   Component Value Date    WBC 5.1 02/03/2022    ANEU 0.8 (L) 01/31/2022    HGB 10.1 (L) 02/03/2022    HCT 30.5 (L) 02/03/2022    PLT 82 (L) 02/03/2022     02/03/2022    POTASSIUM 4.2 02/03/2022    CHLORIDE 109 02/03/2022    CO2 26 02/03/2022    GLC 99 02/03/2022    BUN 13 02/03/2022    CR 1.06 02/03/2022    MAG 2.3 01/31/2022    INR 1.00 01/31/2022    AST 29 02/03/2022    ALT 56 02/03/2022       Juvenal Blake is a 56 year old male PMH significant for refractory NHL, Day +17 of Yescarta CAR-T therapy     1. Follicular Lymphoma/Yescarta CAR-T:    Relapsed after BR, [facility], NAM NK (9/1/20), idelalisib (~4-6/2020),  (6/28/21), polatuzumab (12/21/21). (hx Rituxan reaction). Now day +17 post yescarta; restaging PET on 2/14.      2. HEME: Keep hgb >7g, plt>10.  #anemia and thrombocytopenia 2/2 chemo/lymphoma. Not requiring transfusions. No longer neutropenic.      3. ID:   - runny nose, cough, ear pain, sore throat.  Covid and RVP swabs 2/3.    - notable ongoing hypogammaglobulinemia.  Last checked in January, and it was 286.  This may explain his frequent colds.  Will add on IVIG repletion 2/7.    #Prophy: acyclovir, fluconazole.  Stop cefdinir with engraftment. Inhaled Pentamidine 1/13/21.    #Hx C diff colitis. Screening C.dif + on admission. Asymptomatic. If develops diarrhea, will Rx Vanco.     #hx parainfluenza 3 (12/21/21): Occasional cough, otherwise sx improved.  #hx rhinovirus (10/6/21).      #S/p 3 covid shots; first two were pre- and booster was after.  No need for vaccination  post CART given her did not have auto-trasplant .     Plan evusheld around day 28. wait until he is out of this acute Car-T period.     4. GI:    - omeprazole  - hx medical cannibis  #Abdominal Pain secondary to cancer. Intermittent.      5. Renal/FEN: Creat, lytes wnl.  #severe malnutrition 2/2 chronic illness/cancer (based on wt loss). Use ensure/ high calorie  snacks while intake is at/below 50%.  He is now eating well.   - avoid NSAIDS     6. CV:    Cardiology cleared him to proceed with no further testing. The CT angiogram showed no lesion requiring stenting or bypass.     7. :   #hx Prostatic adenocarcinoma: s/p radical prostatectomy and bilateral lymph node dissection 11/2017. Completed radiation to the prostate fossa and pelvic lymph nodes at [facility] early May 2018. He received 4500 cGy in 25 fractions. He then had 2340 cGy boost in 13 fractions to the prostatic fossa, for a total dose of 6240 cGy in 38 treatment fractions delivered over 54 days. He did not receive hormonal therapy:    9/10 PSA: 0.71 - no concerns.   - Repeat PSA drawn on 1/25 per request of urology; 1.21 (not elevated, but higher than last check in September)       8. Muscle/tendon aches:  Resolved off levaquin.  Still has muscle tension at suboccipitals.  Muscle release exercises given 2/3.     Plan:  IVIG at next visit  RVP and covid swabs  Stop cefdinir  Reduce fluconazole to 100mg daily    RTC:   1/7 for labs, anniversary visit and IVIG.    I spent 30 minutes in the care of this patient today, which included time necessary for preparation for the visit, obtaining history, ordering medications/tests/procedures as medically indicated, review of pertinent medical literature, counseling of the patient, communication of recommendations to the care team, and documentation time.    Narcisa Stewart [facility]  [phone]          Again, thank you for allowing me to participate in the care of your patient.      Sincerely,    BMT Advanced Practice Provider

## 2022-02-03 NOTE — NURSING NOTE
"Oncology Rooming Note    February 3, 2022 9:56 AM   Juvenal Blake is a 56 year old male who presents for:    Chief Complaint   Patient presents with     Port Draw     Labs drawn via PORT by RN in lab. VS taken.      Oncology Clinic Visit     Follicular lymphoma      Initial Vitals: /71   Pulse 57   Temp 98.3  F (36.8  C) (Oral)   Resp 16   Wt 104.8 kg (231 lb)   SpO2 98%   BMI 35.62 kg/m   Estimated body mass index is 35.62 kg/m  as calculated from the following:    Height as of 1/17/22: 1.715 m (5' 7.52\").    Weight as of this encounter: 104.8 kg (231 lb). Body surface area is 2.23 meters squared.  No Pain (0) Comment: Data Unavailable   No LMP for male patient.  Allergies reviewed: Yes  Medications reviewed: Yes    Medications: MEDICATION REFILLS NEEDED TODAY. Provider was notified.  Pharmacy name entered into [employer]: [id number] - [place], [place] - [address] AT [facility]    Clinical concerns: Medication refill: Fluconazole      Dagmar Delgado LPN            "

## 2022-02-03 NOTE — PROGRESS NOTES
BMT/Cell Therapy Daily Progress Note      Patient ID:  Juvenal Blake is a 56 year old male, currently day +14 s/p Yescarta CAR-T.      Diagnosis NHLFL Non-Hodgkin lymphoma, Follicular, predominantly large cell  BMTCT Type Cellular Therapy    Prep Regimen Fludarabine, Cytoxan  Donor Source Autologous     GVHD Prophylaxis NA  Primary BMT MD Dr. Irwin    Clinical Trials   2017-45         Admission date: 1/17/2022     INTERVAL  HISTORY     HPI: Juvenal's neck is still a bit sore in the back, but he has more mobility and it is overall improving.  He has some posterior head/neck ache, some temporal headache, slight runny nose, slight cough and slight sore throat.  His right ear is a little sore.  No GI problems.  No fevers.      Review of Systems: 8 point ROS negative except as noted above.        PHYSICAL EXAM      KPS:  80  /71   Pulse 57   Temp 98.3  F (36.8  C) (Oral)   Resp 16   Wt 104.8 kg (231 lb)   SpO2 98%   BMI 35.62 kg/m       Wt Readings from Last 4 Encounters:   02/03/22 104.8 kg (231 lb)   01/31/22 103.5 kg (228 lb 1.6 oz)   01/30/22 104.5 kg (230 lb 4.8 oz)   01/29/22 104.6 kg (230 lb 11.2 oz)     General: NAD   Eyes: sclera anicteric   Ears: pearly TMs with normal light reflex bilaterally; no erythema, no purulence  Lungs: CTA throughout; coughed once during deep breathing  Cardiovascular: RRR, no M/R/G   Lymphatics: no edema, no cervical/supraclavicular LAD  Skin: No rash  Neuro: CN II-XII intact without focal deficits.    Access:  R chest PAC, not accessed.         LABS AND IMAGING: I have assessed all abnormal lab values for their clinical significance and any values considered clinically significant have been addressed in the assessment and plan.       Lab Results   Component Value Date    WBC 5.1 02/03/2022    ANEU 0.8 (L) 01/31/2022    HGB 10.1 (L) 02/03/2022    HCT 30.5 (L) 02/03/2022    PLT 82 (L) 02/03/2022     02/03/2022    POTASSIUM 4.2 02/03/2022    CHLORIDE 109 02/03/2022     CO2 26 02/03/2022    GLC 99 02/03/2022    BUN 13 02/03/2022    CR 1.06 02/03/2022    MAG 2.3 01/31/2022    INR 1.00 01/31/2022    AST 29 02/03/2022    ALT 56 02/03/2022       Juvenal Blake is a 56 year old male PMH significant for refractory NHL, Day +17 of Yescarta CAR-T therapy     1. Follicular Lymphoma/Yescarta CAR-T:    Relapsed after BR, [facility], NAM NK (9/1/20), idelalisib (~4-6/2020),  (6/28/21), polatuzumab (12/21/21). (hx Rituxan reaction). Now day +17 post yescarta; restaging PET on 2/14.      2. HEME: Keep hgb >7g, plt>10.  #anemia and thrombocytopenia 2/2 chemo/lymphoma. Not requiring transfusions. No longer neutropenic.      3. ID:   - runny nose, cough, ear pain, sore throat.  Covid and RVP swabs 2/3.    - notable ongoing hypogammaglobulinemia.  Last checked in January, and it was 286.  This may explain his frequent colds.  Will add on IVIG repletion 2/7.    #Prophy: acyclovir, fluconazole.  Stop cefdinir with engraftment. Inhaled Pentamidine 1/13/21.    #Hx C diff colitis. Screening C.dif + on admission. Asymptomatic. If develops diarrhea, will Rx Vanco.     #hx parainfluenza 3 (12/21/21): Occasional cough, otherwise sx improved.  #hx rhinovirus (10/6/21).      #S/p 3 covid shots; first two were pre- and booster was after.  No need for vaccination  post CART given her did not have auto-trasplant .     Plan evusheld around day 28. wait until he is out of this acute Car-T period.     4. GI:    - omeprazole  - hx medical cannibis  #Abdominal Pain secondary to cancer. Intermittent.      5. Renal/FEN: Creat, lytes wnl.  #severe malnutrition 2/2 chronic illness/cancer (based on wt loss). Use ensure/ high calorie snacks while intake is at/below 50%.  He is now eating well.   - avoid NSAIDS     6. CV:    Cardiology cleared him to proceed with no further testing. The CT angiogram showed no lesion requiring stenting or bypass.     7. :   #hx Prostatic adenocarcinoma: s/p radical prostatectomy  and bilateral lymph node dissection 11/2017. Completed radiation to the prostate fossa and pelvic lymph nodes at [facility] early May 2018. He received 4500 cGy in 25 fractions. He then had 2340 cGy boost in 13 fractions to the prostatic fossa, for a total dose of 6240 cGy in 38 treatment fractions delivered over 54 days. He did not receive hormonal therapy:    9/10 PSA: 0.71 - no concerns.   - Repeat PSA drawn on 1/25 per request of urology; 1.21 (not elevated, but higher than last check in September)       8. Muscle/tendon aches:  Resolved off levaquin.  Still has muscle tension at suboccipitals.  Muscle release exercises given 2/3.     Plan:  IVIG at next visit  RVP and covid swabs  Stop cefdinir  Reduce fluconazole to 100mg daily    RTC:   1/7 for labs, anniversary visit and IVIG.      I spent 30 minutes in the care of this patient today, which included time necessary for preparation for the visit, obtaining history, ordering medications/tests/procedures as medically indicated, review of pertinent medical literature, counseling of the patient, communication of recommendations to the care team, and documentation time.    Narcisa Stewart PAC  [phone]

## 2022-02-03 NOTE — NURSING NOTE
Nasopharyngeal swabs performed in clinic for COVID and RVP. Patient tolerated well and was discharged. Specimens sent to first floor lab for processing.        Carmela Montano CMA on 2/3/2022 at 11:43 AM

## 2022-02-04 LAB — SARS-COV-2 RNA RESP QL NAA+PROBE: NOT DETECTED

## 2022-02-07 ENCOUNTER — OFFICE VISIT (OUTPATIENT)
Dept: TRANSPLANT | Facility: CLINIC | Age: 57
End: 2022-02-07
Attending: PHYSICIAN ASSISTANT
Payer: COMMERCIAL

## 2022-02-07 ENCOUNTER — APPOINTMENT (OUTPATIENT)
Dept: LAB | Facility: CLINIC | Age: 57
End: 2022-02-07
Attending: PHYSICIAN ASSISTANT
Payer: COMMERCIAL

## 2022-02-07 VITALS
BODY MASS INDEX: 35.16 KG/M2 | TEMPERATURE: 98 F | WEIGHT: 228 LBS | DIASTOLIC BLOOD PRESSURE: 80 MMHG | HEART RATE: 79 BPM | RESPIRATION RATE: 18 BRPM | OXYGEN SATURATION: 97 % | SYSTOLIC BLOOD PRESSURE: 133 MMHG

## 2022-02-07 VITALS
RESPIRATION RATE: 18 BRPM | OXYGEN SATURATION: 100 % | DIASTOLIC BLOOD PRESSURE: 76 MMHG | TEMPERATURE: 98.5 F | HEART RATE: 69 BPM | SYSTOLIC BLOOD PRESSURE: 112 MMHG

## 2022-02-07 DIAGNOSIS — C82.00 FOLLICULAR LYMPHOMA GRADE I, UNSPECIFIED BODY REGION (H): ICD-10-CM

## 2022-02-07 DIAGNOSIS — C82.08 FOLLICULAR LYMPHOMA GRADE I, LYMPH NODES OF MULTIPLE SITES (H): Primary | ICD-10-CM

## 2022-02-07 DIAGNOSIS — D80.1 HYPOGAMMAGLOBULINEMIA (H): ICD-10-CM

## 2022-02-07 LAB
ALBUMIN SERPL-MCNC: 3.8 G/DL (ref 3.4–5)
ALP SERPL-CCNC: 115 U/L (ref 40–150)
ALT SERPL W P-5'-P-CCNC: 47 U/L (ref 0–70)
ANION GAP SERPL CALCULATED.3IONS-SCNC: 7 MMOL/L (ref 3–14)
APTT PPP: 30 SECONDS (ref 22–38)
AST SERPL W P-5'-P-CCNC: 21 U/L (ref 0–45)
BASOPHILS # BLD MANUAL: 0 10E3/UL (ref 0–0.2)
BASOPHILS NFR BLD MANUAL: 1 %
BILIRUB SERPL-MCNC: 0.4 MG/DL (ref 0.2–1.3)
BUN SERPL-MCNC: 14 MG/DL (ref 7–30)
C PNEUM DNA SPEC QL NAA+PROBE: NOT DETECTED
CALCIUM SERPL-MCNC: 9 MG/DL (ref 8.5–10.1)
CHLORIDE BLD-SCNC: 108 MMOL/L (ref 94–109)
CO2 SERPL-SCNC: 26 MMOL/L (ref 20–32)
CREAT SERPL-MCNC: 1.09 MG/DL (ref 0.66–1.25)
CRP SERPL-MCNC: 3.2 MG/L (ref 0–8)
D DIMER PPP FEU-MCNC: 0.39 UG/ML FEU (ref 0–0.5)
DACRYOCYTES BLD QL SMEAR: SLIGHT
EOSINOPHIL # BLD MANUAL: 0 10E3/UL (ref 0–0.7)
EOSINOPHIL NFR BLD MANUAL: 0 %
ERYTHROCYTE [DISTWIDTH] IN BLOOD BY AUTOMATED COUNT: 15.2 % (ref 10–15)
FERRITIN SERPL-MCNC: 192 NG/ML (ref 26–388)
FIBRINOGEN PPP-MCNC: 434 MG/DL (ref 170–490)
FLUAV H1 2009 PAND RNA SPEC QL NAA+PROBE: NOT DETECTED
FLUAV H1 RNA SPEC QL NAA+PROBE: NOT DETECTED
FLUAV H3 RNA SPEC QL NAA+PROBE: NOT DETECTED
FLUAV RNA SPEC QL NAA+PROBE: NOT DETECTED
FLUBV RNA SPEC QL NAA+PROBE: NOT DETECTED
GFR SERPL CREATININE-BSD FRML MDRD: 79 ML/MIN/1.73M2
GLUCOSE BLD-MCNC: 109 MG/DL (ref 70–99)
HADV DNA SPEC QL NAA+PROBE: NOT DETECTED
HCOV PNL SPEC NAA+PROBE: NOT DETECTED
HCT VFR BLD AUTO: 31.7 % (ref 40–53)
HGB BLD-MCNC: 10.5 G/DL (ref 13.3–17.7)
HMPV RNA SPEC QL NAA+PROBE: NOT DETECTED
HPIV1 RNA SPEC QL NAA+PROBE: NOT DETECTED
HPIV2 RNA SPEC QL NAA+PROBE: NOT DETECTED
HPIV3 RNA SPEC QL NAA+PROBE: DETECTED
HPIV4 RNA SPEC QL NAA+PROBE: NOT DETECTED
INR PPP: 0.95 (ref 0.85–1.15)
LDH SERPL L TO P-CCNC: 181 U/L (ref 85–227)
LYMPHOCYTES # BLD MANUAL: 0.4 10E3/UL (ref 0.8–5.3)
LYMPHOCYTES NFR BLD MANUAL: 12 %
M PNEUMO DNA SPEC QL NAA+PROBE: NOT DETECTED
MAGNESIUM SERPL-MCNC: 1.8 MG/DL (ref 1.8–2.6)
MCH RBC QN AUTO: 32.3 PG (ref 26.5–33)
MCHC RBC AUTO-ENTMCNC: 33.1 G/DL (ref 31.5–36.5)
MCV RBC AUTO: 98 FL (ref 78–100)
MONOCYTES # BLD MANUAL: 0.4 10E3/UL (ref 0–1.3)
MONOCYTES NFR BLD MANUAL: 12 %
NEUTROPHILS # BLD MANUAL: 2.7 10E3/UL (ref 1.6–8.3)
NEUTROPHILS NFR BLD MANUAL: 75 %
NRBC # BLD AUTO: 0 10E3/UL
NRBC BLD MANUAL-RTO: 1 %
PHOSPHATE SERPL-MCNC: 2.7 MG/DL (ref 2.5–4.5)
PLAT MORPH BLD: ABNORMAL
PLATELET # BLD AUTO: 61 10E3/UL (ref 150–450)
POTASSIUM BLD-SCNC: 3.8 MMOL/L (ref 3.4–5.3)
PROT SERPL-MCNC: 6.5 G/DL (ref 6.8–8.8)
RBC # BLD AUTO: 3.25 10E6/UL (ref 4.4–5.9)
RBC MORPH BLD: ABNORMAL
RSV RNA SPEC QL NAA+PROBE: NOT DETECTED
RSV RNA SPEC QL NAA+PROBE: NOT DETECTED
RV+EV RNA SPEC QL NAA+PROBE: NOT DETECTED
SODIUM SERPL-SCNC: 141 MMOL/L (ref 133–144)
URATE SERPL-MCNC: 3 MG/DL (ref 3.5–7.2)
WBC # BLD AUTO: 3.6 10E3/UL (ref 4–11)

## 2022-02-07 PROCEDURE — 87633 RESP VIRUS 12-25 TARGETS: CPT | Performed by: PHYSICIAN ASSISTANT

## 2022-02-07 PROCEDURE — 250N000011 HC RX IP 250 OP 636: Performed by: PHYSICIAN ASSISTANT

## 2022-02-07 PROCEDURE — 99214 OFFICE O/P EST MOD 30 MIN: CPT

## 2022-02-07 PROCEDURE — 83735 ASSAY OF MAGNESIUM: CPT | Performed by: PHYSICIAN ASSISTANT

## 2022-02-07 PROCEDURE — 85384 FIBRINOGEN ACTIVITY: CPT | Performed by: PHYSICIAN ASSISTANT

## 2022-02-07 PROCEDURE — 83615 LACTATE (LD) (LDH) ENZYME: CPT | Performed by: PHYSICIAN ASSISTANT

## 2022-02-07 PROCEDURE — G0463 HOSPITAL OUTPT CLINIC VISIT: HCPCS | Mod: 25

## 2022-02-07 PROCEDURE — 96365 THER/PROPH/DIAG IV INF INIT: CPT

## 2022-02-07 PROCEDURE — G0463 HOSPITAL OUTPT CLINIC VISIT: HCPCS

## 2022-02-07 PROCEDURE — 84100 ASSAY OF PHOSPHORUS: CPT | Performed by: PHYSICIAN ASSISTANT

## 2022-02-07 PROCEDURE — 85730 THROMBOPLASTIN TIME PARTIAL: CPT | Performed by: PHYSICIAN ASSISTANT

## 2022-02-07 PROCEDURE — 85610 PROTHROMBIN TIME: CPT | Performed by: PHYSICIAN ASSISTANT

## 2022-02-07 PROCEDURE — 85379 FIBRIN DEGRADATION QUANT: CPT | Performed by: PHYSICIAN ASSISTANT

## 2022-02-07 PROCEDURE — 82728 ASSAY OF FERRITIN: CPT | Performed by: PHYSICIAN ASSISTANT

## 2022-02-07 PROCEDURE — 85014 HEMATOCRIT: CPT | Performed by: PHYSICIAN ASSISTANT

## 2022-02-07 PROCEDURE — 80053 COMPREHEN METABOLIC PANEL: CPT | Performed by: PHYSICIAN ASSISTANT

## 2022-02-07 PROCEDURE — 84550 ASSAY OF BLOOD/URIC ACID: CPT | Performed by: PHYSICIAN ASSISTANT

## 2022-02-07 PROCEDURE — 250N000013 HC RX MED GY IP 250 OP 250 PS 637: Performed by: PHYSICIAN ASSISTANT

## 2022-02-07 PROCEDURE — 86140 C-REACTIVE PROTEIN: CPT | Performed by: PHYSICIAN ASSISTANT

## 2022-02-07 PROCEDURE — 96366 THER/PROPH/DIAG IV INF ADDON: CPT

## 2022-02-07 PROCEDURE — 36591 DRAW BLOOD OFF VENOUS DEVICE: CPT | Performed by: PHYSICIAN ASSISTANT

## 2022-02-07 PROCEDURE — 87486 CHLMYD PNEUM DNA AMP PROBE: CPT | Performed by: PHYSICIAN ASSISTANT

## 2022-02-07 RX ORDER — ACETAMINOPHEN 325 MG/1
650 TABLET ORAL ONCE
Status: COMPLETED | OUTPATIENT
Start: 2022-02-07 | End: 2022-02-07

## 2022-02-07 RX ORDER — HEPARIN SODIUM (PORCINE) LOCK FLUSH IV SOLN 100 UNIT/ML 100 UNIT/ML
5 SOLUTION INTRAVENOUS EVERY 8 HOURS
Status: DISCONTINUED | OUTPATIENT
Start: 2022-02-07 | End: 2022-02-07 | Stop reason: HOSPADM

## 2022-02-07 RX ORDER — DIPHENHYDRAMINE HCL 25 MG
50 CAPSULE ORAL ONCE
Status: COMPLETED | OUTPATIENT
Start: 2022-02-07 | End: 2022-02-07

## 2022-02-07 RX ADMIN — DIPHENHYDRAMINE HYDROCHLORIDE 50 MG: 25 CAPSULE ORAL at 10:13

## 2022-02-07 RX ADMIN — ACETAMINOPHEN 650 MG: 325 TABLET ORAL at 10:13

## 2022-02-07 RX ADMIN — Medication 5 ML: at 09:58

## 2022-02-07 RX ADMIN — HUMAN IMMUNOGLOBULIN G 35 G: 20 LIQUID INTRAVENOUS at 10:49

## 2022-02-07 ASSESSMENT — PAIN SCALES - GENERAL: PAINLEVEL: NO PAIN (0)

## 2022-02-07 NOTE — PROGRESS NOTES
BMT/Cell Therapy Daily Progress Note      Patient ID:  Juvenal Blake is a 56 year old male, currently day +21 s/p [employer] CAR-T.      Diagnosis NHLFL Non-Hodgkin lymphoma, Follicular, predominantly large cell  BMTCT Type Cellular Therapy    Prep Regimen Fludarabine, Cytoxan  Donor Source Autologous     GVHD Prophylaxis NA  Primary BMT MD Dr. Irwin    Clinical Trials   2017-45         Admission date: 1/17/2022     INTERVAL  HISTORY     HPI: Juvenal still has URI complaints--cough, runny nose.  No fever.  No productive cough or yellow phlegm.  No GI problems.  No bleeding.     Review of Systems: 8 point ROS negative except as noted above.        PHYSICAL EXAM      KPS:  80  /80   Pulse 79   Resp 18   Wt 103.4 kg (228 lb)   SpO2 97%   BMI 35.16 kg/m       Wt Readings from Last 4 Encounters:   02/07/22 103.4 kg (228 lb)   02/03/22 104.8 kg (231 lb)   01/31/22 103.5 kg (228 lb 1.6 oz)   01/30/22 104.5 kg (230 lb 4.8 oz)     General: NAD   Eyes: sclera anicteric   Lungs: CTA throughout  Cardiovascular: RRR, no M/R/G   Lymphatics: no edema, no cervical/supraclavicular LAD  Skin: No rash  Neuro: CN II-XII intact without focal deficits.    Access:  R chest PAC, not accessed.         LABS AND IMAGING: I have assessed all abnormal lab values for their clinical significance and any values considered clinically significant have been addressed in the assessment and plan.       Lab Results   Component Value Date    WBC 5.1 02/03/2022    ANEU 0.8 (L) 01/31/2022    HGB 10.1 (L) 02/03/2022    HCT 30.5 (L) 02/03/2022    PLT 82 (L) 02/03/2022     02/03/2022    POTASSIUM 4.2 02/03/2022    CHLORIDE 109 02/03/2022    CO2 26 02/03/2022    GLC 99 02/03/2022    BUN 13 02/03/2022    CR 1.06 02/03/2022    MAG 2.3 01/31/2022    INR 1.00 01/31/2022    AST 29 02/03/2022    ALT 56 02/03/2022       Juvenal Blake is a 56 year old male PMH significant for refractory NHL, Day +21 of [employer] CAR-T therapy     1. Follicular  Lymphoma/[employer]carta CAR-T:    Relapsed after BR, [facility], NAM NK (9/1/20), idelalisib (~4-6/2020),  (6/28/21), polatuzumab (12/21/21). (hx Rituxan reaction).   - restaging PET on 2/14.   - uric acid remains ok; stop allopurinol todya     2. HEME: Keep hgb >7g, plt>10.  #anemia and thrombocytopenia 2/2 chemo/lymphoma. Not requiring transfusions. No longer neutropenic.      3. ID:   - runny nose, cough, ear pain, sore throat.  Covid and RVP swabs 2/3.  +Parainfluenza  - notable ongoing hypogammaglobulinemia.  Last checked in January, and it was 286.  This may explain his frequent colds.  IVIG repletion today    #Prophy: acyclovir, fluconazole.  Stop cefdinir with engraftment. Inhaled Pentamidine 1/13/21.    #Hx C diff colitis. Screening C.dif + on admission. Asymptomatic. If develops diarrhea, will Rx Vanco.     #hx parainfluenza 3 (12/21/21): Occasional cough, otherwise sx improved.  #hx rhinovirus (10/6/21).      #S/p 3 covid shots; first two were pre- and booster was after.  No need for vaccination  post CART given he did not have auto-trasplant .     Plan evusheld around day 28. wait until he is out of this acute Car-T period.     4. GI:    - omeprazole  - hx medical cannibis  #Abdominal Pain secondary to cancer. Intermittent.      5. Renal/FEN: Creat, lytes wnl.  #severe malnutrition 2/2 chronic illness/cancer (based on wt loss). Use ensure/ high calorie snacks while intake is at/below 50%.  He is now eating well.   - avoid NSAIDS     6. CV:    Cardiology cleared him to proceed with no further testing. The CT angiogram showed no lesion requiring stenting or bypass.     7. :   #hx Prostatic adenocarcinoma: s/p radical prostatectomy and bilateral lymph node dissection 11/2017. Completed radiation to the prostate fossa and pelvic lymph nodes at [facility] early May 2018. He received 4500 cGy in 25 fractions. He then had 2340 cGy boost in 13 fractions to the prostatic fossa, for a total dose of 6240  cGy in 38 treatment fractions delivered over 54 days. He did not receive hormonal therapy:    9/10 PSA: 0.71 - no concerns.   - Repeat PSA drawn on 1/25 per request of urology; 1.21 (not elevated, but higher than last check in September)       8. Muscle/tendon aches:  Resolved off levaquin.  Still has muscle tension at suboccipitals.  Muscle release exercises given 2/3.     Plan:  IVIG today (NO hydrocortisone premed)    RTC:   2/14:  PET/labs; add provider visit; sooner prn  2/17: Review w/ Dr. Terry    I spent 30 minutes in the care of this patient today, which included time necessary for preparation for the visit, obtaining history, ordering medications/tests/procedures as medically indicated, review of pertinent medical literature, counseling of the patient, communication of recommendations to the care team, and documentation time.    Tasia Liu pa-c  [phone]

## 2022-02-07 NOTE — LETTER
2/7/2022         RE: Juvenal Blake  [address] [place] [address]        Dear Colleague,    Thank you for referring your patient, Juvenal Blake, to the [facility]LOOD AND [facility]ARR[facility]ANSPLANT PROGRAM [place]. Please see a copy of my visit note below.    Infusion Nursing Note:  Juvenal Blake presents today for scheduled IVIG.    Patient seen by provider today: Yes: Tasia Liu   present during visit today: Not Applicable.    Note: Juvenal here today for scheduled IVIG infusion. Premedicated with tylenol and benadryl- solucortef not given d/t contraindication s/p CART. Seen in infusion by provider. Labs were monitored, no additional infusion needs identified. Tolerated infusion without any side effects or complications.       Intravenous Access:  Implanted Port.    Treatment Conditions:  Lab Results   Component Value Date    HGB 10.5 (L) 02/07/2022    WBC 3.6 (L) 02/07/2022    ANEU 2.7 02/07/2022    ANEUTAUTO 3.7 02/03/2022    PLT 61 (L) 02/07/2022      Lab Results   Component Value Date     02/07/2022    POTASSIUM 3.8 02/07/2022    MAG 2.3 01/31/2022    CR 1.09 02/07/2022    TRE 9.0 02/07/2022    BILITOTAL 0.4 02/07/2022    ALBUMIN 3.8 02/07/2022    ALT 47 02/07/2022    AST 21 02/07/2022         Post Infusion Assessment:  Patient tolerated infusion without incident.  Site patent and intact, free from redness, edema or discomfort.  No evidence of extravasations.       Discharge Plan:   Patient discharged in stable condition accompanied by: self.      Apryl Mayorga RN                        Again, thank you for allowing me to participate in the care of your patient.        Sincerely,        [facility]

## 2022-02-07 NOTE — LETTER
2/7/2022         RE: Juvenal Blake  [address] [place] [address]        Dear Colleague,    Thank you for referring your patient, Juvenal Blake, to the [facility] BLOOD AND MARR[facility] TRANSPLANT PROGRAM [place]. Please see a copy of my visit note below.    BMT/Cell Therapy Daily Progress Note      Patient ID:  Juvenal Blake is a 56 year old male, currently day +21 s/p [employer] CAR-T.      Diagnosis NHLFL Non-Hodgkin lymphoma, Follicular, predominantly large cell  BMTCT Type Cellular Therapy    Prep Regimen Fludarabine, Cytoxan  Donor Source Autologous     GVHD Prophylaxis NA  Primary BMT MD Dr. Irwin    Clinical Trials   2017-45         Admission date: 1/17/2022     INTERVAL  HISTORY     HPI: Juvenal still has URI complaints--cough, runny nose.  No fever.  No productive cough or yellow phlegm.  No GI problems.  No bleeding.     Review of Systems: 8 point ROS negative except as noted above.        PHYSICAL EXAM      KPS:  80  /80   Pulse 79   Resp 18   Wt 103.4 kg (228 lb)   SpO2 97%   BMI 35.16 kg/m       Wt Readings from Last 4 Encounters:   02/07/22 103.4 kg (228 lb)   02/03/22 104.8 kg (231 lb)   01/31/22 103.5 kg (228 lb 1.6 oz)   01/30/22 104.5 kg (230 lb 4.8 oz)     General: NAD   Eyes: sclera anicteric   Lungs: CTA throughout  Cardiovascular: RRR, no M/R/G   Lymphatics: no edema, no cervical/supraclavicular LAD  Skin: No rash  Neuro: CN II-XII intact without focal deficits.    Access:  R chest PAC, not accessed.         LABS AND IMAGING: I have assessed all abnormal lab values for their clinical significance and any values considered clinically significant have been addressed in the assessment and plan.       Lab Results   Component Value Date    WBC 5.1 02/03/2022    ANEU 0.8 (L) 01/31/2022    HGB 10.1 (L) 02/03/2022    HCT 30.5 (L) 02/03/2022    PLT 82 (L) 02/03/2022     02/03/2022    POTASSIUM 4.2 02/03/2022    CHLORIDE 109 02/03/2022    CO2 26 02/03/2022    GLC 99  02/03/2022    BUN 13 02/03/2022    CR 1.06 02/03/2022    MAG 2.3 01/31/2022    INR 1.00 01/31/2022    AST 29 02/03/2022    ALT 56 02/03/2022       Juvenal Blake is a 56 year old male PMH significant for refractory NHL, Day +21 of [employer] CAR-T therapy     1. Follicular Lymphoma/[employer] CAR-T:    Relapsed after BR, [facility], NAM NK (9/1/20), idelalisib (~4-6/2020),  (6/28/21), polatuzumab (12/21/21). (hx Rituxan reaction).   - restaging PET on 2/14.   - uric acid remains ok; stop allopurinol todya     2. HEME: Keep hgb >7g, plt>10.  #anemia and thrombocytopenia 2/2 chemo/lymphoma. Not requiring transfusions. No longer neutropenic.      3. ID:   - runny nose, cough, ear pain, sore throat.  Covid and RVP swabs 2/3.  +Parainfluenza  - notable ongoing hypogammaglobulinemia.  Last checked in January, and it was 286.  This may explain his frequent colds.  IVIG repletion today    #Prophy: acyclovir, fluconazole.  Stop cefdinir with engraftment. Inhaled Pentamidine 1/13/21.    #Hx C diff colitis. Screening C.dif + on admission. Asymptomatic. If develops diarrhea, will Rx Vanco.     #hx parainfluenza 3 (12/21/21): Occasional cough, otherwise sx improved.  #hx rhinovirus (10/6/21).      #S/p 3 covid shots; first two were pre- and booster was after.  No need for vaccination  post CART given he did not have auto-trasplant .     Plan evusheld around day 28. wait until he is out of this acute Car-T period.     4. GI:    - omeprazole  - hx medical cannibis  #Abdominal Pain secondary to cancer. Intermittent.      5. Renal/FEN: Creat, lytes wnl.  #severe malnutrition 2/2 chronic illness/cancer (based on wt loss). Use ensure/ high calorie snacks while intake is at/below 50%.  He is now eating well.   - avoid NSAIDS     6. CV:    Cardiology cleared him to proceed with no further testing. The CT angiogram showed no lesion requiring stenting or bypass.     7. :   #hx Prostatic adenocarcinoma: s/p radical prostatectomy and  bilateral lymph node dissection 11/2017. Completed radiation to the prostate fossa and pelvic lymph nodes at [facility] early May 2018. He received 4500 cGy in 25 fractions. He then had 2340 cGy boost in 13 fractions to the prostatic fossa, for a total dose of 6240 cGy in 38 treatment fractions delivered over 54 days. He did not receive hormonal therapy:    9/10 PSA: 0.71 - no concerns.   - Repeat PSA drawn on 1/25 per request of urology; 1.21 (not elevated, but higher than last check in September)       8. Muscle/tendon aches:  Resolved off levaquin.  Still has muscle tension at suboccipitals.  Muscle release exercises given 2/3.     Plan:  IVIG today (NO hydrocortisone premed)    RTC:   2/14:  PET/labs; add provider visit; sooner prn  2/17: Review w/ Dr. Terry    I spent 30 minutes in the care of this patient today, which included time necessary for preparation for the visit, obtaining history, ordering medications/tests/procedures as medically indicated, review of pertinent medical literature, counseling of the patient, communication of recommendations to the care team, and documentation time.    Tasia Liu pa-c  [phone]        Again, thank you for allowing me to participate in the care of your patient.        Sincerely,        BMT Advanced Practice Provider

## 2022-02-07 NOTE — NURSING NOTE
Chief Complaint   Patient presents with     Port Draw     power needle. heparin locked, vitals checked     Wanda Rose RN on 2/7/2022 at 9:45 AM

## 2022-02-07 NOTE — NURSING NOTE
"Oncology Rooming Note    February 7, 2022 10:07 AM   Juvenal Blake is a 56 year old male who presents for:    Chief Complaint   Patient presents with     Port Draw     power needle. heparin locked, vitals checked     Initial Vitals: /80   Pulse 79   Temp 98  F (36.7  C)   Resp 18   Wt 103.4 kg (228 lb)   SpO2 97%   BMI 35.16 kg/m   Estimated body mass index is 35.16 kg/m  as calculated from the following:    Height as of 1/17/22: 1.715 m (5' 7.52\").    Weight as of this encounter: 103.4 kg (228 lb). Body surface area is 2.22 meters squared.  No Pain (0) Comment: Data Unavailable   No LMP for male patient.  Allergies reviewed: Yes  Medications reviewed: Yes    Medications: Medication refills not needed today.  Pharmacy [facility] entered into [employer]: [id number] - [place], [place] - [address] AT [facility]    Clinical concerns: frequent headaches lately  Tasia was notified.      Apryl Mayorga RN            "

## 2022-02-07 NOTE — PROGRESS NOTES
Infusion Nursing Note:  Juvenal Blake presents today for scheduled IVIG.    Patient seen by provider today: Yes: Tasia Liu   present during visit today: Not Applicable.    Note: Juvenal here today for scheduled IVIG infusion. Premedicated with tylenol and benadryl- solucortef not given d/t contraindication s/p CART. Seen in infusion by provider. Labs were monitored, no additional infusion needs identified. Tolerated infusion without any side effects or complications.       Intravenous Access:  Implanted Port.    Treatment Conditions:  Lab Results   Component Value Date    HGB 10.5 (L) 02/07/2022    WBC 3.6 (L) 02/07/2022    ANEU 2.7 02/07/2022    ANEUTAUTO 3.7 02/03/2022    PLT 61 (L) 02/07/2022      Lab Results   Component Value Date     02/07/2022    POTASSIUM 3.8 02/07/2022    MAG 2.3 01/31/2022    CR 1.09 02/07/2022    TRE 9.0 02/07/2022    BILITOTAL 0.4 02/07/2022    ALBUMIN 3.8 02/07/2022    ALT 47 02/07/2022    AST 21 02/07/2022         Post Infusion Assessment:  Patient tolerated infusion without incident.  Site patent and intact, free from redness, edema or discomfort.  No evidence of extravasations.       Discharge Plan:   Patient discharged in stable condition accompanied by: self.      Apryl Mayorga RN

## 2022-02-14 ENCOUNTER — HOSPITAL ENCOUNTER (OUTPATIENT)
Dept: PET IMAGING | Facility: CLINIC | Age: 57
End: 2022-02-14
Attending: PHYSICIAN ASSISTANT
Payer: COMMERCIAL

## 2022-02-14 ENCOUNTER — APPOINTMENT (OUTPATIENT)
Dept: LAB | Facility: CLINIC | Age: 57
End: 2022-02-14
Attending: PHYSICIAN ASSISTANT
Payer: COMMERCIAL

## 2022-02-14 ENCOUNTER — ONCOLOGY VISIT (OUTPATIENT)
Dept: TRANSPLANT | Facility: CLINIC | Age: 57
End: 2022-02-14
Attending: PHYSICIAN ASSISTANT
Payer: COMMERCIAL

## 2022-02-14 VITALS
DIASTOLIC BLOOD PRESSURE: 84 MMHG | OXYGEN SATURATION: 99 % | SYSTOLIC BLOOD PRESSURE: 137 MMHG | TEMPERATURE: 97.4 F | RESPIRATION RATE: 16 BRPM | BODY MASS INDEX: 36.16 KG/M2 | WEIGHT: 234.5 LBS | HEART RATE: 68 BPM

## 2022-02-14 DIAGNOSIS — C82.00 FOLLICULAR LYMPHOMA GRADE I, UNSPECIFIED BODY REGION (H): ICD-10-CM

## 2022-02-14 DIAGNOSIS — C82.08 FOLLICULAR LYMPHOMA GRADE I, LYMPH NODES OF MULTIPLE SITES (H): ICD-10-CM

## 2022-02-14 LAB
ANION GAP SERPL CALCULATED.3IONS-SCNC: 5 MMOL/L (ref 3–14)
BASOPHILS # BLD AUTO: 0 10E3/UL (ref 0–0.2)
BASOPHILS NFR BLD AUTO: 0 %
BUN SERPL-MCNC: 14 MG/DL (ref 7–30)
CALCIUM SERPL-MCNC: 8.7 MG/DL (ref 8.5–10.1)
CHLORIDE BLD-SCNC: 106 MMOL/L (ref 94–109)
CO2 SERPL-SCNC: 27 MMOL/L (ref 20–32)
CREAT SERPL-MCNC: 1.04 MG/DL (ref 0.66–1.25)
EOSINOPHIL # BLD AUTO: 0 10E3/UL (ref 0–0.7)
EOSINOPHIL NFR BLD AUTO: 0 %
ERYTHROCYTE [DISTWIDTH] IN BLOOD BY AUTOMATED COUNT: 15.7 % (ref 10–15)
GFR SERPL CREATININE-BSD FRML MDRD: 83 ML/MIN/1.73M2
GLUCOSE BLD-MCNC: 95 MG/DL (ref 70–99)
HCT VFR BLD AUTO: 29.9 % (ref 40–53)
HGB BLD-MCNC: 9.9 G/DL (ref 13.3–17.7)
IMM GRANULOCYTES # BLD: 0.1 10E3/UL
IMM GRANULOCYTES NFR BLD: 1 %
LYMPHOCYTES # BLD AUTO: 0.4 10E3/UL (ref 0.8–5.3)
LYMPHOCYTES NFR BLD AUTO: 8 %
MCH RBC QN AUTO: 32.4 PG (ref 26.5–33)
MCHC RBC AUTO-ENTMCNC: 33.1 G/DL (ref 31.5–36.5)
MCV RBC AUTO: 98 FL (ref 78–100)
MONOCYTES # BLD AUTO: 0.3 10E3/UL (ref 0–1.3)
MONOCYTES NFR BLD AUTO: 7 %
NEUTROPHILS # BLD AUTO: 3.8 10E3/UL (ref 1.6–8.3)
NEUTROPHILS NFR BLD AUTO: 84 %
NRBC # BLD AUTO: 0 10E3/UL
NRBC BLD AUTO-RTO: 0 /100
PLATELET # BLD AUTO: 78 10E3/UL (ref 150–450)
POTASSIUM BLD-SCNC: 4.1 MMOL/L (ref 3.4–5.3)
RBC # BLD AUTO: 3.06 10E6/UL (ref 4.4–5.9)
SODIUM SERPL-SCNC: 138 MMOL/L (ref 133–144)
WBC # BLD AUTO: 4.6 10E3/UL (ref 4–11)

## 2022-02-14 PROCEDURE — A9552 F18 FDG: HCPCS | Performed by: PHYSICIAN ASSISTANT

## 2022-02-14 PROCEDURE — 250N000011 HC RX IP 250 OP 636: Performed by: PHYSICIAN ASSISTANT

## 2022-02-14 PROCEDURE — 36591 DRAW BLOOD OFF VENOUS DEVICE: CPT

## 2022-02-14 PROCEDURE — 82310 ASSAY OF CALCIUM: CPT

## 2022-02-14 PROCEDURE — 343N000001 HC RX 343: Performed by: PHYSICIAN ASSISTANT

## 2022-02-14 PROCEDURE — 74177 CT ABD & PELVIS W/CONTRAST: CPT | Mod: 26 | Performed by: RADIOLOGY

## 2022-02-14 PROCEDURE — 71260 CT THORAX DX C+: CPT | Mod: 26 | Performed by: RADIOLOGY

## 2022-02-14 PROCEDURE — 70491 CT SOFT TISSUE NECK W/DYE: CPT

## 2022-02-14 PROCEDURE — 78816 PET IMAGE W/CT FULL BODY: CPT | Mod: 26 | Performed by: RADIOLOGY

## 2022-02-14 PROCEDURE — 99214 OFFICE O/P EST MOD 30 MIN: CPT

## 2022-02-14 PROCEDURE — 70491 CT SOFT TISSUE NECK W/DYE: CPT | Mod: 26 | Performed by: RADIOLOGY

## 2022-02-14 PROCEDURE — 85025 COMPLETE CBC W/AUTO DIFF WBC: CPT

## 2022-02-14 PROCEDURE — 74177 CT ABD & PELVIS W/CONTRAST: CPT | Mod: 59,PS

## 2022-02-14 RX ORDER — IOPAMIDOL 755 MG/ML
5-140 INJECTION, SOLUTION INTRAVASCULAR ONCE
Status: COMPLETED | OUTPATIENT
Start: 2022-02-14 | End: 2022-02-14

## 2022-02-14 RX ORDER — HEPARIN SODIUM (PORCINE) LOCK FLUSH IV SOLN 100 UNIT/ML 100 UNIT/ML
5 SOLUTION INTRAVENOUS ONCE
Status: COMPLETED | OUTPATIENT
Start: 2022-02-14 | End: 2022-02-14

## 2022-02-14 RX ORDER — HEPARIN SODIUM (PORCINE) LOCK FLUSH IV SOLN 100 UNIT/ML 100 UNIT/ML
500 SOLUTION INTRAVENOUS ONCE
Status: COMPLETED | OUTPATIENT
Start: 2022-02-14 | End: 2022-02-14

## 2022-02-14 RX ADMIN — HEPARIN 5 ML: 100 SYRINGE at 09:16

## 2022-02-14 RX ADMIN — Medication 500 UNITS: at 09:49

## 2022-02-14 RX ADMIN — FLUDEOXYGLUCOSE F-18 13.59 MCI.: 500 INJECTION, SOLUTION INTRAVENOUS at 07:45

## 2022-02-14 RX ADMIN — IOPAMIDOL 135 ML: 755 INJECTION, SOLUTION INTRAVENOUS at 08:40

## 2022-02-14 ASSESSMENT — PAIN SCALES - GENERAL: PAINLEVEL: NO PAIN (0)

## 2022-02-14 NOTE — PROGRESS NOTES
BMT/Cell Therapy Daily Progress Note      Patient ID:  Juvenal Blake is a 56 year old male, currently day +28 s/p [employer] CAR-T.      Diagnosis NHLFL Non-Hodgkin lymphoma, Follicular, predominantly large cell  BMTCT Type Cellular Therapy    Prep Regimen Fludarabine, Cytoxan  Donor Source Autologous     GVHD Prophylaxis NA  Primary BMT MD Dr. Irwin    Clinical Trials   2017-45         Admission date: 1/17/2022     INTERVAL  HISTORY     HPI: Juvenal still has URI complaints--cough, runny nose. No fever or SOB. Feels cough more productive however he does not spit any phlegm out to see if it is discolored. Cough is a little more pronounced in evening, however he does not notice it more when he lies flat compared to standing.   Stomach is  while he's in the car if there are big bumps. No n/v/d. No bleeding. Difficulty with neck range of motion has resolved.    Review of Systems: 8 point ROS negative except as noted above.        PHYSICAL EXAM      KPS:  80  /84   Pulse 68   Temp 97.4  F (36.3  C) (Oral)   Resp 16   Wt 106.4 kg (234 lb 8 oz)   SpO2 99%   BMI 36.16 kg/m       Wt Readings from Last 4 Encounters:   02/14/22 106.4 kg (234 lb 8 oz)   02/07/22 103.4 kg (228 lb)   02/03/22 104.8 kg (231 lb)   01/31/22 103.5 kg (228 lb 1.6 oz)     General: NAD   Eyes: sclera anicteric   Lungs: expiratory wheeze mid left lobe, otherwise clear  Cardiovascular: RRR, no M/R/G   Lymphatics: no edema, no cervical/supraclavicular LAD  Skin: No rash  Neuro: CN II-XII intact without focal deficits.    Access: R chest PAC, not accessed.         LABS AND IMAGING: I have assessed all abnormal lab values for their clinical significance and any values considered clinically significant have been addressed in the assessment and plan.       Lab Results   Component Value Date    WBC 4.6 02/14/2022    ANEU 2.7 02/07/2022    HGB 9.9 (L) 02/14/2022    HCT 29.9 (L) 02/14/2022    PLT 78 (L) 02/14/2022     02/14/2022     POTASSIUM 4.1 02/14/2022    CHLORIDE 106 02/14/2022    CO2 27 02/14/2022    GLC 95 02/14/2022    BUN 14 02/14/2022    CR 1.04 02/14/2022    MAG 1.8 02/07/2022    INR 0.95 02/07/2022    AST 21 02/07/2022    ALT 47 02/07/2022       Juvenal Blake is a 56 year old male PMH significant for refractory NHL, Day +28 of [employer] CAR-T therapy     1. Follicular Lymphoma/[employer] CAR-T:    Relapsed after BR, [facility], NAM NK (9/1/20), idelalisib (~4-6/2020),  (6/28/21), polatuzumab (12/21/21). (hx Rituxan reaction).   - restaging PET on 2/14.   - uric acid remains ok; stop allopurinol todya     2. HEME: Keep hgb >7g, plt>10.  #anemia and thrombocytopenia 2/2 chemo/lymphoma. Not requiring transfusions. No longer neutropenic.      3. ID: Afebrile, however with same URI sx. Await PET scan results to eval for consolidation (writer could not appreciate this however will defer to read) vs resolving viral URI. Robitussin sent.  - runny nose, cough, ear pain, sore throat. Covid and RVP swabs 2/3.  +Parainfluenza  - notable ongoing hypogammaglobulinemia. Last checked in January, and it was 286.  This may explain his frequent colds.  IVIG repletion today    #Prophy: acyclovir, fluconazole.  Stop cefdinir with engraftment. Inhaled Pentamidine 1/13/21. Change to IV 2/17 with ongoing cough  #Hx C diff colitis. Screening C.dif + on admission. Asymptomatic. If develops diarrhea, will Rx Vanco.     #hx parainfluenza 3 (12/21/21): Occasional cough, otherwise sx improved.  #hx rhinovirus (10/6/21).      #S/p 3 covid shots; first two were pre- and booster was after.  No need for vaccination  post CART given he did not have auto-trasplant .     Plan evusheld around day 28. wait until he is out of this acute Car-T period.     4. GI:    - omeprazole  - hx medical cannibis  #Abdominal Pain secondary to cancer. Intermittent.      5. Renal/FEN: Creat, lytes wnl.  #severe malnutrition 2/2 chronic illness/cancer (based on wt loss). Use ensure/  high calorie snacks while intake is at/below 50%.  He is now eating well.   - avoid NSAIDS     6. CV:    Cardiology cleared him to proceed with no further testing. The CT angiogram showed no lesion requiring stenting or bypass.     7. :   #hx Prostatic adenocarcinoma: s/p radical prostatectomy and bilateral lymph node dissection 11/2017. Completed radiation to the prostate fossa and pelvic lymph nodes at [facility] early May 2018. He received 4500 cGy in 25 fractions. He then had 2340 cGy boost in 13 fractions to the prostatic fossa, for a total dose of 6240 cGy in 38 treatment fractions delivered over 54 days. He did not receive hormonal therapy:    9/10 PSA: 0.71 - no concerns.   - Repeat PSA drawn on 1/25 per request of urology; 1.21 (not elevated, but higher than last check in September)       8. Muscle/tendon aches:  Resolved off levaquin.  Still has muscle tension at suboccipitals.  Muscle release exercises given 2/3.       Plan:  Await PET results for possible left lobe consolidation vs resolving URI sx  Robitussin for cough    RTC:   Requested IV pentamidine 2/17 2/17: Review w/ Dr. Terry    I spent 30 minutes in the care of this patient today, which included time necessary for preparation for the visit, obtaining history, ordering medications/tests/procedures as medically indicated, review of pertinent medical literature, counseling of the patient, communication of recommendations to the care team, and documentation time.    Narcisa Stewart PAC  [phone]

## 2022-02-14 NOTE — LETTER
2/14/2022     RE: Juvenal Blake  [address] [place] [address]    Dear Colleague,    Thank you for referring your patient, Juvenal Blake, to the [facility] BLOOD AND MARROW TRANSPLANT PROGRAM [place]. Please see a copy of my visit note below.    BMT/Cell Therapy Daily Progress Note      Patient ID:  Juvenal Blake is a 56 year old male, currently day +28 s/p Yescarta CAR-T.      Diagnosis NHLFL Non-Hodgkin lymphoma, Follicular, predominantly large cell  BMTCT Type Cellular Therapy    Prep Regimen Fludarabine, Cytoxan  Donor Source Autologous     GVHD Prophylaxis NA  Primary BMT MD Dr. Irwin    Clinical Trials   2017-45         Admission date: 1/17/2022     INTERVAL  HISTORY     HPI: Juvenal still has URI complaints--cough, runny nose. No fever or SOB. Feels cough more productive however he does not spit any phlegm out to see if it is discolored. Cough is a little more pronounced in evening, however he does not notice it more when he lies flat compared to standing.   Stomach is  while he's in the car if there are big bumps. No n/v/d. No bleeding. Difficulty with neck range of motion has resolved.    Review of Systems: 8 point ROS negative except as noted above.          PHYSICAL EXAM      KPS:  80  /84   Pulse 68   Temp 97.4  F (36.3  C) (Oral)   Resp 16   Wt 106.4 kg (234 lb 8 oz)   SpO2 99%   BMI 36.16 kg/m       Wt Readings from Last 4 Encounters:   02/14/22 106.4 kg (234 lb 8 oz)   02/07/22 103.4 kg (228 lb)   02/03/22 104.8 kg (231 lb)   01/31/22 103.5 kg (228 lb 1.6 oz)     General: NAD   Eyes: sclera anicteric   Lungs: expiratory wheeze mid left lobe, otherwise clear  Cardiovascular: RRR, no M/R/G   Lymphatics: no edema, no cervical/supraclavicular LAD  Skin: No rash  Neuro: CN II-XII intact without focal deficits.    Access: R chest PAC, not accessed.         LABS AND IMAGING: I have assessed all abnormal lab values for their clinical significance and any  values considered clinically significant have been addressed in the assessment and plan.       Lab Results   Component Value Date    WBC 4.6 02/14/2022    ANEU 2.7 02/07/2022    HGB 9.9 (L) 02/14/2022    HCT 29.9 (L) 02/14/2022    PLT 78 (L) 02/14/2022     02/14/2022    POTASSIUM 4.1 02/14/2022    CHLORIDE 106 02/14/2022    CO2 27 02/14/2022    GLC 95 02/14/2022    BUN 14 02/14/2022    CR 1.04 02/14/2022    MAG 1.8 02/07/2022    INR 0.95 02/07/2022    AST 21 02/07/2022    ALT 47 02/07/2022       Juvenal Blake is a 56 year old male PMH significant for refractory NHL, Day +28 of Yescarta CAR-T therapy     1. Follicular Lymphoma/Yescarta CAR-T:    Relapsed after BR, [facility], NAM NK (9/1/20), idelalisib (~4-6/2020),  (6/28/21), polatuzumab (12/21/21). (hx Rituxan reaction).   - restaging PET on 2/14.   - uric acid remains ok; stop allopurinol todya     2. HEME: Keep hgb >7g, plt>10.  #anemia and thrombocytopenia 2/2 chemo/lymphoma. Not requiring transfusions. No longer neutropenic.      3. ID: Afebrile, however with same URI sx. Await PET scan results to eval for consolidation (writer could not appreciate this however will defer to read) vs resolving viral URI. Robitussin sent.  - runny nose, cough, ear pain, sore throat. Covid and RVP swabs 2/3.  +Parainfluenza  - notable ongoing hypogammaglobulinemia. Last checked in January, and it was 286.  This may explain his frequent colds.  IVIG repletion today    #Prophy: acyclovir, fluconazole.  Stop cefdinir with engraftment. Inhaled Pentamidine 1/13/21. Change to IV 2/17 with ongoing cough  #Hx C diff colitis. Screening C.dif + on admission. Asymptomatic. If develops diarrhea, will Rx Vanco.     #hx parainfluenza 3 (12/21/21): Occasional cough, otherwise sx improved.  #hx rhinovirus (10/6/21).      #S/p 3 covid shots; first two were pre- and booster was after.  No need for vaccination  post CART given he did not have auto-trasplant .     Plan evusheld around  day 28. wait until he is out of this acute Car-T period.     4. GI:    - omeprazole  - hx medical cannibis  #Abdominal Pain secondary to cancer. Intermittent.      5. Renal/FEN: Creatlandy wnl.  #severe malnutrition 2/2 chronic illness/cancer (based on wt loss). Use ensure/ high calorie snacks while intake is at/below 50%.  He is now eating well.   - avoid NSAIDS     6. CV:    Cardiology cleared him to proceed with no further testing. The CT angiogram showed no lesion requiring stenting or bypass.     7. :   #hx Prostatic adenocarcinoma: s/p radical prostatectomy and bilateral lymph node dissection 11/2017. Completed radiation to the prostate fossa and pelvic lymph nodes at [facility] early May 2018. He received 4500 cGy in 25 fractions. He then had 2340 cGy boost in 13 fractions to the prostatic fossa, for a total dose of 6240 cGy in 38 treatment fractions delivered over 54 days. He did not receive hormonal therapy:    9/10 PSA: 0.71 - no concerns.   - Repeat PSA drawn on 1/25 per request of urology; 1.21 (not elevated, but higher than last check in September)       8. Muscle/tendon aches:  Resolved off levaquin.  Still has muscle tension at suboccipitals.  Muscle release exercises given 2/3.     Plan:  Await PET results for possible left lobe consolidation vs resolving URI sx  Robitussin for cough    RTC:   Requested IV pentamidine 2/17 2/17: Review w/ Dr. Terry    I spent 30 minutes in the care of this patient today, which included time necessary for preparation for the visit, obtaining history, ordering medications/tests/procedures as medically indicated, review of pertinent medical literature, counseling of the patient, communication of recommendations to the care team, and documentation time.    Narcisa Stewart PAC  [phone]

## 2022-02-14 NOTE — NURSING NOTE
"Chief Complaint   Patient presents with     Port Draw     Labs drawn via port by RN in lab.  VS taken       Port accessed with 20 gauge 3/4\" Power needle by RN in CT, labs collected, line flushed with saline and heparin, then de-accesed.  Vitals taken. Pt checked in for appointment(s).    Zaynab Stanton RN    "

## 2022-02-16 RX ORDER — HEPARIN SODIUM,PORCINE 10 UNIT/ML
2 VIAL (ML) INTRAVENOUS
Status: CANCELLED | OUTPATIENT
Start: 2022-02-17

## 2022-02-16 RX ORDER — HEPARIN SODIUM,PORCINE 10 UNIT/ML
2 VIAL (ML) INTRAVENOUS
Status: CANCELLED | OUTPATIENT
Start: 2022-03-16

## 2022-02-16 NOTE — PROGRESS NOTES
BMT/Cell Therapy Daily Progress Note      Patient ID:  Juvenal Blake is a 56 year old male, currently day +31 s/p Yescarta CAR-T.      Diagnosis NHLFL Non-Hodgkin lymphoma, Follicular, predominantly large cell  BMTCT Type Cellular Therapy    Prep Regimen Fludarabine, Cytoxan  Donor Source Autologous     GVHD Prophylaxis NA  Primary BMT MD Dr. Irwin    Clinical Trials   2017-45            INTERVAL  HISTORY     Juvenal is seen in infusion today accompanied by his wife.  He has not reviewed his PET results and is happy to hear them.  He notes he has a history of hemorrhoids that have previously required banding.  He reports he is generally feeling well and denies any acute medical concerns.  He notes he has had an intermittent cough over the past month but this has resolved in the past few days.  No fevers/chills, SOB, chest pain, or other issues today.    Review of Systems: 8 point ROS negative except as noted above.        PHYSICAL EXAM      KPS:  80  /75 (BP Location: Right arm, Patient Position: Sitting, Cuff Size: Adult Regular)   Pulse 71   Temp 98.4  F (36.9  C) (Oral)   Resp 16   Wt 107.5 kg (237 lb 1.6 oz)   SpO2 97%   BMI 36.57 kg/m       Wt Readings from Last 4 Encounters:   02/17/22 107.5 kg (237 lb 1.6 oz)   02/14/22 106.4 kg (234 lb 8 oz)   02/07/22 103.4 kg (228 lb)   02/03/22 104.8 kg (231 lb)     General: NAD, sitting up in chair  Eyes: sclera anicteric   Lungs: clear to auscultation bilaterally  Cardiovascular: RRR, no M/R/G   Lymphatics: no edema, no cervical/supraclavicular LAD  Skin: No rashs in visualized areas        LABS AND IMAGING: I have assessed all abnormal lab values for their clinical significance and any values considered clinically significant have been addressed in the assessment and plan.       Lab Results   Component Value Date    WBC 3.3 (L) 02/17/2022    ANEU 2.7 02/07/2022    HGB 10.0 (L) 02/17/2022    HCT 29.7 (L) 02/17/2022    PLT 73 (L) 02/17/2022      02/17/2022    POTASSIUM 3.9 02/17/2022    CHLORIDE 106 02/17/2022    CO2 27 02/17/2022     (H) 02/17/2022    BUN 13 02/17/2022    CR 1.00 02/17/2022    MAG 1.9 02/17/2022    INR 0.96 02/17/2022    AST 34 02/17/2022    ALT 72 (H) 02/17/2022        Combined Report of:    PET and CT on  2/14/2022 9:20 AM :     IMPRESSION:  In this position with a history of follicular lymphoma currently  receiving Yescarta CAR-T therapy there is evidence for complete  metabolic response by Lugano criteria:     1. Interval resolution of the FDG avidity of numerous mediastinal,  retroperitoneal, and right inguinal lymph nodes as detailed above.  Marked decreased size of these nodes. No new or enlarging suspicious  lymphadenopathy.  2. The previously visualized right axillary hypermetabolic enlarged  lymph nodes have resolved.  3. Slightly decreased splenomegaly currently measuring 13.5 cm,  previously 13.9 cm.  4. Persistent increased uptake of the anal canal and anorectal  junction region. No responding masslike appearance on CT. Findings  could be inflammatory. Physical examination could be considered.     Assessment:  Juvenal Blake is a 56 year old male PMH significant for refractory NHL, Day +31 of Yescarta CAR-T therapy.     1. Follicular Lymphoma/Yescarta CAR-T:    Relapsed after BR, [facility], NAM NK (9/1/20), idelalisib (~4-6/2020),  (6/28/21), polatuzumab (12/21/21). (hx Rituxan reaction).  Restaging PET on 2/14 shows complete metabolic response.  Persistent uptake at anal canal without concurrent mass is likely secondary to known hemorrhoids.  Would consider anoscopy if any concerns on follow up imaging - discussed this briefly with Mr. Blake today.       2. HEME: Keep hgb >7g, plt>10.  Mild pancytopenia in the setting of recent chemo and CAR-T therapy.  Monitor for now.       3. ID:  No active issues.  Had parainfluenza earlier this month but is now recovered aside from reporting mild persistent cough (none  appreciated during visit today.  Received IVIG at last visit.    #Prophy: acyclovir, fluconazole.  Stop cefdinir with engraftment. Inhaled Pentamidine 1/13/21. IV pentamadine given 2/17/22.  #Hx C diff colitis. Screening C.dif + on admission. Asymptomatic. If develops diarrhea, will Rx Vanco.     #hx parainfluenza 3 (12/21/21): Reports occasional cough, otherwise sx improved.  #hx rhinovirus (10/6/21).      #S/p 3 covid shots; first two were pre- and booster was after.  No need for vaccination  post CART given he did not have auto-trasplant .        4. GI:    Has had intermittent abdominal pain likely secondary to malignancy, none today.  - omeprazole  - hx medical cannibis     5. Renal/FEN: Creat, lytes wnl.  Avoid NSAIDs.    6. CV:    History of stable angina and NSTEMI type II.  Evaluated by cardiology prior to transplant, CTA coronary artery and echo completed.  No intervention recommended prior to cellular therapy.     7. :   #hx Prostatic adenocarcinoma: s/p radical prostatectomy and bilateral lymph node dissection 11/2017. Completed radiation to the prostate fossa and pelvic lymph nodes at [facility] early May 2018. He received 4500 cGy in 25 fractions. He then had 2340 cGy boost in 13 fractions to the prostatic fossa, for a total dose of 6240 cGy in 38 treatment fractions delivered over 54 days. He did not receive hormonal therapy:    - PSA 1.21 on 1/25/22, 0.71 on 9/10/21     8. Muscle/tendon aches:  Resolved off levaquin.  Still has muscle tension at suboccipitals.  Muscle release exercises given 2/3.        Plan:   RTC in one month for D+60 visit    Patient seen with attending Dr. Terry.    Dayday Butt MD  Hematology/Oncology/Transplant Fellow    Talked to patient and evaluated by me. We discussed the risks and benefits of receiving EVUSHELD, as well as alternative treatments. The Emergency Use Administration (EUA) was provided to the patient for review and an opportunity for  questions was provided. Patient wishes to proceed with EVUSHELD.

## 2022-02-17 ENCOUNTER — APPOINTMENT (OUTPATIENT)
Dept: LAB | Facility: CLINIC | Age: 57
End: 2022-02-17
Payer: COMMERCIAL

## 2022-02-17 ENCOUNTER — INFUSION THERAPY VISIT (OUTPATIENT)
Dept: TRANSPLANT | Facility: CLINIC | Age: 57
End: 2022-02-17
Payer: COMMERCIAL

## 2022-02-17 ENCOUNTER — OFFICE VISIT (OUTPATIENT)
Dept: TRANSPLANT | Facility: CLINIC | Age: 57
End: 2022-02-17
Payer: COMMERCIAL

## 2022-02-17 VITALS
DIASTOLIC BLOOD PRESSURE: 75 MMHG | SYSTOLIC BLOOD PRESSURE: 136 MMHG | BODY MASS INDEX: 36.57 KG/M2 | WEIGHT: 237.1 LBS | TEMPERATURE: 98.4 F | HEART RATE: 71 BPM | RESPIRATION RATE: 16 BRPM | OXYGEN SATURATION: 97 %

## 2022-02-17 DIAGNOSIS — C82.08 FOLLICULAR LYMPHOMA GRADE I, LYMPH NODES OF MULTIPLE SITES (H): Primary | ICD-10-CM

## 2022-02-17 DIAGNOSIS — Z85.46 HISTORY OF MALIGNANT NEOPLASM OF PROSTATE: ICD-10-CM

## 2022-02-17 DIAGNOSIS — C82.00 FOLLICULAR LYMPHOMA GRADE I, UNSPECIFIED BODY REGION (H): ICD-10-CM

## 2022-02-17 LAB
ALBUMIN SERPL-MCNC: 3.6 G/DL (ref 3.4–5)
ALP SERPL-CCNC: 104 U/L (ref 40–150)
ALT SERPL W P-5'-P-CCNC: 72 U/L (ref 0–70)
ANION GAP SERPL CALCULATED.3IONS-SCNC: 7 MMOL/L (ref 3–14)
APTT PPP: 29 SECONDS (ref 22–38)
AST SERPL W P-5'-P-CCNC: 34 U/L (ref 0–45)
BASOPHILS # BLD AUTO: 0 10E3/UL (ref 0–0.2)
BASOPHILS NFR BLD AUTO: 1 %
BILIRUB SERPL-MCNC: 0.3 MG/DL (ref 0.2–1.3)
BUN SERPL-MCNC: 13 MG/DL (ref 7–30)
CALCIUM SERPL-MCNC: 8.7 MG/DL (ref 8.5–10.1)
CD19 CELLS # BLD: <1 CELLS/UL (ref 107–698)
CD19 CELLS NFR BLD: <1 % (ref 6–27)
CD3 CELLS # BLD: 302 CELLS/UL (ref 603–2990)
CD3 CELLS NFR BLD: 67 % (ref 49–84)
CD3+CD4+ CELLS # BLD: 189 CELLS/UL (ref 441–2156)
CD3+CD4+ CELLS NFR BLD: 42 % (ref 28–63)
CD3+CD4+ CELLS/CD3+CD8+ CLL BLD: 1.64 % (ref 1.4–2.6)
CD3+CD8+ CELLS # BLD: 115 CELLS/UL (ref 125–1312)
CD3+CD8+ CELLS NFR BLD: 25 % (ref 10–40)
CD3-CD16+CD56+ CELLS # BLD: 138 CELLS/UL (ref 95–640)
CD3-CD16+CD56+ CELLS NFR BLD: 30 % (ref 4–25)
CHLORIDE BLD-SCNC: 106 MMOL/L (ref 94–109)
CO2 SERPL-SCNC: 27 MMOL/L (ref 20–32)
CREAT SERPL-MCNC: 1 MG/DL (ref 0.66–1.25)
CRP SERPL-MCNC: <2.9 MG/L (ref 0–8)
D DIMER PPP FEU-MCNC: 0.37 UG/ML FEU (ref 0–0.5)
EOSINOPHIL # BLD AUTO: 0 10E3/UL (ref 0–0.7)
EOSINOPHIL NFR BLD AUTO: 1 %
ERYTHROCYTE [DISTWIDTH] IN BLOOD BY AUTOMATED COUNT: 15.8 % (ref 10–15)
FERRITIN SERPL-MCNC: 200 NG/ML (ref 26–388)
FIBRINOGEN PPP-MCNC: 308 MG/DL (ref 170–490)
GFR SERPL CREATININE-BSD FRML MDRD: 87 ML/MIN/1.73M2
GLUCOSE BLD-MCNC: 126 MG/DL (ref 70–99)
HCT VFR BLD AUTO: 29.7 % (ref 40–53)
HGB BLD-MCNC: 10 G/DL (ref 13.3–17.7)
IGG SERPL-MCNC: 507 MG/DL (ref 610–1616)
IMM GRANULOCYTES # BLD: 0.1 10E3/UL
IMM GRANULOCYTES NFR BLD: 2 %
INR PPP: 0.96 (ref 0.85–1.15)
LDH SERPL L TO P-CCNC: 184 U/L (ref 85–227)
LYMPHOCYTES # BLD AUTO: 0.4 10E3/UL (ref 0.8–5.3)
LYMPHOCYTES NFR BLD AUTO: 11 %
MAGNESIUM SERPL-MCNC: 1.9 MG/DL (ref 1.6–2.3)
MCH RBC QN AUTO: 32.9 PG (ref 26.5–33)
MCHC RBC AUTO-ENTMCNC: 33.7 G/DL (ref 31.5–36.5)
MCV RBC AUTO: 98 FL (ref 78–100)
MONOCYTES # BLD AUTO: 0.4 10E3/UL (ref 0–1.3)
MONOCYTES NFR BLD AUTO: 12 %
NEUTROPHILS # BLD AUTO: 2.4 10E3/UL (ref 1.6–8.3)
NEUTROPHILS NFR BLD AUTO: 73 %
NRBC # BLD AUTO: 0 10E3/UL
NRBC BLD AUTO-RTO: 0 /100
PHOSPHATE SERPL-MCNC: 2.8 MG/DL (ref 2.5–4.5)
PLATELET # BLD AUTO: 73 10E3/UL (ref 150–450)
POTASSIUM BLD-SCNC: 3.9 MMOL/L (ref 3.4–5.3)
PROT SERPL-MCNC: 6.4 G/DL (ref 6.8–8.8)
RBC # BLD AUTO: 3.04 10E6/UL (ref 4.4–5.9)
SODIUM SERPL-SCNC: 140 MMOL/L (ref 133–144)
T CELL EXTENDED COMMENT: ABNORMAL
URATE SERPL-MCNC: 4.2 MG/DL (ref 3.5–7.2)
WBC # BLD AUTO: 3.3 10E3/UL (ref 4–11)

## 2022-02-17 PROCEDURE — 82784 ASSAY IGA/IGD/IGG/IGM EACH: CPT | Performed by: PHYSICIAN ASSISTANT

## 2022-02-17 PROCEDURE — 85384 FIBRINOGEN ACTIVITY: CPT | Performed by: PHYSICIAN ASSISTANT

## 2022-02-17 PROCEDURE — 36591 DRAW BLOOD OFF VENOUS DEVICE: CPT | Performed by: PHYSICIAN ASSISTANT

## 2022-02-17 PROCEDURE — 85610 PROTHROMBIN TIME: CPT | Performed by: PHYSICIAN ASSISTANT

## 2022-02-17 PROCEDURE — 99214 OFFICE O/P EST MOD 30 MIN: CPT | Mod: GC

## 2022-02-17 PROCEDURE — 85004 AUTOMATED DIFF WBC COUNT: CPT | Performed by: PHYSICIAN ASSISTANT

## 2022-02-17 PROCEDURE — 250N000011 HC RX IP 250 OP 636

## 2022-02-17 PROCEDURE — 82040 ASSAY OF SERUM ALBUMIN: CPT | Performed by: PHYSICIAN ASSISTANT

## 2022-02-17 PROCEDURE — 86140 C-REACTIVE PROTEIN: CPT | Performed by: PHYSICIAN ASSISTANT

## 2022-02-17 PROCEDURE — 258N000003 HC RX IP 258 OP 636: Performed by: STUDENT IN AN ORGANIZED HEALTH CARE EDUCATION/TRAINING PROGRAM

## 2022-02-17 PROCEDURE — 82728 ASSAY OF FERRITIN: CPT | Performed by: PHYSICIAN ASSISTANT

## 2022-02-17 PROCEDURE — 86355 B CELLS TOTAL COUNT: CPT | Performed by: PHYSICIAN ASSISTANT

## 2022-02-17 PROCEDURE — 96375 TX/PRO/DX INJ NEW DRUG ADDON: CPT

## 2022-02-17 PROCEDURE — G0463 HOSPITAL OUTPT CLINIC VISIT: HCPCS

## 2022-02-17 PROCEDURE — 250N000009 HC RX 250: Mod: JW | Performed by: STUDENT IN AN ORGANIZED HEALTH CARE EDUCATION/TRAINING PROGRAM

## 2022-02-17 PROCEDURE — 84100 ASSAY OF PHOSPHORUS: CPT | Performed by: PHYSICIAN ASSISTANT

## 2022-02-17 PROCEDURE — 83735 ASSAY OF MAGNESIUM: CPT | Performed by: PHYSICIAN ASSISTANT

## 2022-02-17 PROCEDURE — 85379 FIBRIN DEGRADATION QUANT: CPT | Performed by: PHYSICIAN ASSISTANT

## 2022-02-17 PROCEDURE — 83615 LACTATE (LD) (LDH) ENZYME: CPT | Performed by: PHYSICIAN ASSISTANT

## 2022-02-17 PROCEDURE — 84550 ASSAY OF BLOOD/URIC ACID: CPT | Performed by: PHYSICIAN ASSISTANT

## 2022-02-17 PROCEDURE — 96365 THER/PROPH/DIAG IV INF INIT: CPT

## 2022-02-17 PROCEDURE — 80053 COMPREHEN METABOLIC PANEL: CPT | Performed by: PHYSICIAN ASSISTANT

## 2022-02-17 PROCEDURE — 85730 THROMBOPLASTIN TIME PARTIAL: CPT | Performed by: PHYSICIAN ASSISTANT

## 2022-02-17 RX ORDER — HEPARIN SODIUM (PORCINE) LOCK FLUSH IV SOLN 100 UNIT/ML 100 UNIT/ML
5 SOLUTION INTRAVENOUS ONCE
Status: COMPLETED | OUTPATIENT
Start: 2022-02-17 | End: 2022-02-17

## 2022-02-17 RX ADMIN — PENTAMIDINE ISETHIONATE 270 MG: 300 INJECTION, POWDER, LYOPHILIZED, FOR SOLUTION INTRAMUSCULAR; INTRAVENOUS at 11:32

## 2022-02-17 RX ADMIN — SODIUM CHLORIDE, PRESERVATIVE FREE 5 ML: 5 INJECTION INTRAVENOUS at 10:41

## 2022-02-17 RX ADMIN — SODIUM CHLORIDE, PRESERVATIVE FREE 5 ML: 5 INJECTION INTRAVENOUS at 13:01

## 2022-02-17 RX ADMIN — PROCHLORPERAZINE EDISYLATE 10 MG: 5 INJECTION INTRAMUSCULAR; INTRAVENOUS at 11:46

## 2022-02-17 ASSESSMENT — PAIN SCALES - GENERAL: PAINLEVEL: NO PAIN (0)

## 2022-02-17 NOTE — LETTER
2/17/2022         RE: Juvenal Blake  [address] [place] [address]        Dear Colleague,    Thank you for referring your patient, Juvenal Blake, to the [facility] BLOOD AND MARROW TRANSPLANT PROGRAM [place]. Please see a copy of my visit note below.    Infusion Nursing Note:  Juvenal Blake presents today for scheduled infusion.    Patient seen by provider today: Yes: Dr. Terry   present during visit today: Not Applicable.    Note: Labs were monitored.    Intravenous Access:  Implanted Port.    Treatment Conditions:  Patient received scheduled Pentamidine infusion.    Post Infusion Assessment:  About three minutes into the infusion, Patient complained of nausea.  Infusion was stopped, provider notified.  Provider ordered and Patient received 10 mg IV push Compazine.  The infusion was started 10 minutes later, and he tolerated the remainder of the infusion well.      Discharge Plan:   Patient discharged in stable condition accompanied by: wife.    ANGELA TEIXEIRA RN    Again, thank you for allowing me to participate in the care of your patient.      Sincerely,    [facility]

## 2022-02-17 NOTE — NURSING NOTE
"Oncology Rooming Note    February 17, 2022 11:07 AM   Juvenal Blake is a 56 year old male who presents for:    Chief Complaint   Patient presents with     Port Draw     Labs drawn from port by RN in lab. VS taken.      Oncology Clinic Visit     DLBCL (diffuse large B cell lymphoma) (H)     Initial Vitals: /75 (BP Location: Right arm, Patient Position: Sitting, Cuff Size: Adult Regular)   Pulse 71   Temp 98.4  F (36.9  C) (Oral)   Resp 16   Wt 107.5 kg (237 lb 1.6 oz)   SpO2 97%   BMI 36.57 kg/m   Estimated body mass index is 36.57 kg/m  as calculated from the following:    Height as of 1/17/22: 1.715 m (5' 7.52\").    Weight as of this encounter: 107.5 kg (237 lb 1.6 oz). Body surface area is 2.26 meters squared.  No Pain (0) Comment: Data Unavailable   No LMP for male patient.  Allergies reviewed: Yes  Medications reviewed: Yes    Medications: Medication refills not needed today.  Pharmacy name entered into [facility]: [id number] - [place], [place] - [address] AT [facility]    Clinical concerns: Patient requesting to speak with  in relation to billing-provider fellow made aware of patient need. Patient to be seen in infusion for today's visit.        Bárbara Krueger LPN February 17, 2022 11:09 AM                "

## 2022-02-17 NOTE — PROGRESS NOTES
Infusion Nursing Note:  Juvenal Blake presents today for scheduled infusion.    Patient seen by provider today: Yes: Dr. Terry   present during visit today: Not Applicable.    Note: Labs were monitored.      Intravenous Access:  Implanted Port.    Treatment Conditions:  Patient received scheduled Pentamidine infusion.      Post Infusion Assessment:  About three minutes into the infusion, Patient complained of nausea.  Infusion was stopped, provider notified.  Provider ordered and Patient received 10 mg IV push Compazine.  The infusion was started 10 minutes later, and he tolerated the remainder of the infusion well.        Discharge Plan:   Patient discharged in stable condition accompanied by: wife.      ANGELA TEIXEIRA RN

## 2022-02-17 NOTE — LETTER
2/17/2022         RE: Juvenal Blake  [address] [place] [address]        Dear Colleague,    Thank you for referring your patient, Juvenal Blake, to the [facility] BLOOD AND MARROW TRANSPLANT PROGRAM [place]. Please see a copy of my visit note below.    BMT/Cell Therapy Daily Progress Note      Patient ID:  Juvenal Blake is a 56 year old male, currently day +31 s/p Yescarta CAR-T.      Diagnosis NHLFL Non-Hodgkin lymphoma, Follicular, predominantly large cell  BMTCT Type Cellular Therapy    Prep Regimen Fludarabine, Cytoxan  Donor Source Autologous     GVHD Prophylaxis NA  Primary BMT MD Dr. Irwin    Clinical Trials   2017-45            INTERVAL  HISTORY     Juvenal is seen in infusion today accompanied by his wife.  He has not reviewed his PET results and is happy to hear them.  He notes he has a history of hemorrhoids that have previously required banding.  He reports he is generally feeling well and denies any acute medical concerns.  He notes he has had an intermittent cough over the past month but this has resolved in the past few days.  No fevers/chills, SOB, chest pain, or other issues today.    Review of Systems: 8 point ROS negative except as noted above.        PHYSICAL EXAM      KPS:  80  /75 (BP Location: Right arm, Patient Position: Sitting, Cuff Size: Adult Regular)   Pulse 71   Temp 98.4  F (36.9  C) (Oral)   Resp 16   Wt 107.5 kg (237 lb 1.6 oz)   SpO2 97%   BMI 36.57 kg/m       Wt Readings from Last 4 Encounters:   02/17/22 107.5 kg (237 lb 1.6 oz)   02/14/22 106.4 kg (234 lb 8 oz)   02/07/22 103.4 kg (228 lb)   02/03/22 104.8 kg (231 lb)     General: NAD, sitting up in chair  Eyes: sclera anicteric   Lungs: clear to auscultation bilaterally  Cardiovascular: RRR, no M/R/G   Lymphatics: no edema, no cervical/supraclavicular LAD  Skin: No rashs in visualized areas        LABS AND IMAGING: I have assessed all abnormal lab values for their clinical significance  and any values considered clinically significant have been addressed in the assessment and plan.       Lab Results   Component Value Date    WBC 3.3 (L) 02/17/2022    ANEU 2.7 02/07/2022    HGB 10.0 (L) 02/17/2022    HCT 29.7 (L) 02/17/2022    PLT 73 (L) 02/17/2022     02/17/2022    POTASSIUM 3.9 02/17/2022    CHLORIDE 106 02/17/2022    CO2 27 02/17/2022     (H) 02/17/2022    BUN 13 02/17/2022    CR 1.00 02/17/2022    MAG 1.9 02/17/2022    INR 0.96 02/17/2022    AST 34 02/17/2022    ALT 72 (H) 02/17/2022        Combined Report of:    PET and CT on  2/14/2022 9:20 AM :     IMPRESSION:  In this position with a history of follicular lymphoma currently  receiving Yescarta CAR-T therapy there is evidence for complete  metabolic response by Lugano criteria:     1. Interval resolution of the FDG avidity of numerous mediastinal,  retroperitoneal, and right inguinal lymph nodes as detailed above.  Marked decreased size of these nodes. No new or enlarging suspicious  lymphadenopathy.  2. The previously visualized right axillary hypermetabolic enlarged  lymph nodes have resolved.  3. Slightly decreased splenomegaly currently measuring 13.5 cm,  previously 13.9 cm.  4. Persistent increased uptake of the anal canal and anorectal  junction region. No responding masslike appearance on CT. Findings  could be inflammatory. Physical examination could be considered.     Assessment:  Juvenal Blake is a 56 year old male PMH significant for refractory NHL, Day +31 of Yescarta CAR-T therapy.     1. Follicular Lymphoma/Yescarta CAR-T:    Relapsed after BR, [facility], NAM NK (9/1/20), idelalisib (~4-6/2020),  (6/28/21), polatuzumab (12/21/21). (hx Rituxan reaction).  Restaging PET on 2/14 shows complete metabolic response.  Persistent uptake at anal canal without concurrent mass is likely secondary to known hemorrhoids.  Would consider anoscopy if any concerns on follow up imaging - discussed this briefly with Mr. Blake  today.       2. HEME: Keep hgb >7g, plt>10.  Mild pancytopenia in the setting of recent chemo and CAR-T therapy.  Monitor for now.       3. ID:  No active issues.  Had parainfluenza earlier this month but is now recovered aside from reporting mild persistent cough (none appreciated during visit today.  Received IVIG at last visit.    #Prophy: acyclovir, fluconazole.  Stop cefdinir with engraftment. Inhaled Pentamidine 1/13/21. IV pentamadine given 2/17/22.  #Hx C diff colitis. Screening C.dif + on admission. Asymptomatic. If develops diarrhea, will Rx Vanco.     #hx parainfluenza 3 (12/21/21): Reports occasional cough, otherwise sx improved.  #hx rhinovirus (10/6/21).      #S/p 3 covid shots; first two were pre- and booster was after.  No need for vaccination  post CART given he did not have auto-trasplant .        4. GI:    Has had intermittent abdominal pain likely secondary to malignancy, none today.  - omeprazole  - hx medical cannibis     5. Renal/FEN: Creat, lytes wnl.  Avoid NSAIDs.    6. CV:    History of stable angina and NSTEMI type II.  Evaluated by cardiology prior to transplant, CTA coronary artery and echo completed.  No intervention recommended prior to cellular therapy.     7. :   #hx Prostatic adenocarcinoma: s/p radical prostatectomy and bilateral lymph node dissection 11/2017. Completed radiation to the prostate fossa and pelvic lymph nodes at [facility] early May 2018. He received 4500 cGy in 25 fractions. He then had 2340 cGy boost in 13 fractions to the prostatic fossa, for a total dose of 6240 cGy in 38 treatment fractions delivered over 54 days. He did not receive hormonal therapy:    - PSA 1.21 on 1/25/22, 0.71 on 9/10/21     8. Muscle/tendon aches:  Resolved off levaquin.  Still has muscle tension at suboccipitals.  Muscle release exercises given 2/3.        Plan:   RTC in one month for D+60 visit    Patient seen with attending Dr. Terry.    Dayday Butt,  MD  Hematology/Oncology/Transplant Fellow    Talked to patient and evaluated by me. We discussed the risks and benefits of receiving EVUSHELD, as well as alternative treatments. The Emergency Use Administration (EUA) was provided to the patient for review and an opportunity for questions was provided. Patient wishes to proceed with EVUSHELD.      Attestation signed by Rosa Terry MD at 2/21/2022 12:02 PM:  Today, I  saw and examined the patient independently in clinic and discussed the recommendations. I reviewed the case with the fellow and agree with the note as above.     Juvenal is 30 days after Yescarta in CR !! This is great, he will cont to recover. Ok to leave home.   Monitor per protocol.   Check IgG level at day 60.           Again, thank you for allowing me to participate in the care of your patient.      Sincerely,    Rosa Terry MD

## 2022-02-18 LAB — CMV DNA SPEC NAA+PROBE-ACNC: NOT DETECTED IU/ML

## 2022-03-16 NOTE — PROGRESS NOTES
BMT/Cell Therapy Daily Progress Note      Patient ID:  Juvenal Blake is a 56 year old male, currently day +59 s/p Yescarta CAR-T.      Diagnosis NHLFL Non-Hodgkin lymphoma, Follicular, predominantly large cell  BMTCT Type Cellular Therapy    Prep Regimen Fludarabine, Cytoxan  Donor Source Autologous     GVHD Prophylaxis NA  Primary BMT MD Dr. Irwin    Clinical Trials   2017-45            INTERVAL  HISTORY     Juvenal presents alone today and reports he is doing well.  He is active at home, energy level and appetite are both good.  He denies any acute concerns including fevers/chills, lumps/bumps, night sweats, unintentional weight loss, shortness of breath, or chest pain.     Review of Systems: 8 point ROS negative except as noted above.        PHYSICAL EXAM      KPS:  90  /66   Pulse 63   Temp 98.8  F (37.1  C)   Resp 16   Wt 109.4 kg (241 lb 1.6 oz)   SpO2 99%   BMI 37.18 kg/m       Wt Readings from Last 4 Encounters:   03/17/22 109.4 kg (241 lb 1.6 oz)   02/17/22 107.5 kg (237 lb 1.6 oz)   02/14/22 106.4 kg (234 lb 8 oz)   02/07/22 103.4 kg (228 lb)     General: NAD, sitting up on exam table  Eyes: sclera anicteric   Lungs: clear to auscultation bilaterally  Cardiovascular: RRR, no M/R/G   Lymphatics: no edema, no cervical/supraclavicular LAD  Skin: No rashs in visualized areas  Ext: No LE edema  Neuro: Grossly intact, normal gait        LABS AND IMAGING: I have assessed all abnormal lab values for their clinical significance and any values considered clinically significant have been addressed in the assessment and plan.       Lab Results   Component Value Date    WBC 2.2 (L) 03/17/2022    ANEU 1.6 03/17/2022    HGB 10.4 (L) 03/17/2022    HCT 30.9 (L) 03/17/2022    PLT 83 (L) 03/17/2022     03/17/2022    POTASSIUM 4.0 03/17/2022    CHLORIDE 108 03/17/2022    CO2 28 03/17/2022     (H) 03/17/2022    BUN 13 03/17/2022    CR 1.05 03/17/2022    MAG 2.2 03/17/2022    INR 0.96 02/17/2022     AST 26 03/17/2022    ALT 36 03/17/2022       Assessment:  Juvenal Blake is a 56 year old male PMH significant for refractory NHL, Day +59 of Yescarta CAR-T therapy.     1. Follicular Lymphoma/Yescarta CAR-T:    Relapsed after BR, OCHOP, NAM NK (9/1/20), idelalisib (~4-6/2020),  (6/28/21), polatuzumab (12/21/21). (hx Rituxan reaction).  Restaging PET 2/14 showed CR.  Clinically doing well today with stable anemia/thrombocytopenia.  Has persistent leukopenia with lymphopenia likely secondary to CAR-T therapy.  LDH is mildly elevated at 259.  Absolute CD4 is 99.  Repeat PET for D+100 in approximately 1 month.       2. HEME: Keep hgb >7g, plt>10.    3. ID:  No active issues.     #Prophy: acyclovir.  Inhaled pentamadine monthly, given today.  #S/p 3 covid shots; first two were pre- and booster was after.  No need for vaccination  post CART given he did not have auto-trasplant .        4. GI:    - omeprazole for acid reflux  - hx medical cannibis     5. Renal/FEN: Creat, lytes wnl.  Avoid NSAIDs.    6. CV:    History of stable angina and NSTEMI type II.  Evaluated by cardiology prior to transplant, CTA coronary artery and echo completed.  No intervention recommended prior to cellular therapy.    7. :   #hx Prostatic adenocarcinoma: s/p radical prostatectomy and bilateral lymph node dissection 11/2017. Completed radiation to the prostate fossa and pelvic lymph nodes at [facility] early May 2018. He received 4500 cGy in 25 fractions. He then had 2340 cGy boost in 13 fractions to the prostatic fossa, for a total dose of 6240 cGy in 38 treatment fractions delivered over 54 days. He did not receive hormonal therapy:    - PSA 1.21 on 1/25/22, 0.71 on 9/10/21     8. Muscle/tendon aches:  Resolved off levaquin.      Plan:  - RTC in 1 month for D+100, sooner if needed    Patient seen with attending Dr. Terry.    Dayday Butt MD  Hematology/Oncology/Transplant Fellow

## 2022-03-17 ENCOUNTER — OFFICE VISIT (OUTPATIENT)
Dept: TRANSPLANT | Facility: CLINIC | Age: 57
End: 2022-03-17
Payer: COMMERCIAL

## 2022-03-17 ENCOUNTER — APPOINTMENT (OUTPATIENT)
Dept: LAB | Facility: CLINIC | Age: 57
End: 2022-03-17
Payer: COMMERCIAL

## 2022-03-17 VITALS
TEMPERATURE: 98.8 F | HEART RATE: 63 BPM | RESPIRATION RATE: 16 BRPM | WEIGHT: 241.1 LBS | BODY MASS INDEX: 37.18 KG/M2 | OXYGEN SATURATION: 99 % | SYSTOLIC BLOOD PRESSURE: 130 MMHG | DIASTOLIC BLOOD PRESSURE: 66 MMHG

## 2022-03-17 DIAGNOSIS — Z85.46 HISTORY OF MALIGNANT NEOPLASM OF PROSTATE: Primary | ICD-10-CM

## 2022-03-17 DIAGNOSIS — C82.08 FOLLICULAR LYMPHOMA GRADE I, LYMPH NODES OF MULTIPLE SITES (H): Primary | ICD-10-CM

## 2022-03-17 DIAGNOSIS — C83.398 DIFFUSE LARGE B-CELL LYMPHOMA OF EXTRANODAL SITE EXCLUDING SPLEEN AND OTHER SOLID ORGANS: ICD-10-CM

## 2022-03-17 DIAGNOSIS — C82.08 FOLLICULAR LYMPHOMA GRADE I, LYMPH NODES OF MULTIPLE SITES (H): ICD-10-CM

## 2022-03-17 LAB
ALBUMIN SERPL-MCNC: 4 G/DL (ref 3.4–5)
ALP SERPL-CCNC: 75 U/L (ref 40–150)
ALT SERPL W P-5'-P-CCNC: 36 U/L (ref 0–70)
ANION GAP SERPL CALCULATED.3IONS-SCNC: 4 MMOL/L (ref 3–14)
AST SERPL W P-5'-P-CCNC: 26 U/L (ref 0–45)
BASOPHILS # BLD MANUAL: 0 10E3/UL (ref 0–0.2)
BASOPHILS NFR BLD MANUAL: 1 %
BILIRUB SERPL-MCNC: 0.5 MG/DL (ref 0.2–1.3)
BUN SERPL-MCNC: 13 MG/DL (ref 7–30)
CALCIUM SERPL-MCNC: 8.8 MG/DL (ref 8.5–10.1)
CD19 CELLS # BLD: 36 CELLS/UL (ref 107–698)
CD19 CELLS NFR BLD: 11 % (ref 6–27)
CD3 CELLS # BLD: 131 CELLS/UL (ref 603–2990)
CD3 CELLS NFR BLD: 41 % (ref 49–84)
CD3+CD4+ CELLS # BLD: 99 CELLS/UL (ref 441–2156)
CD3+CD4+ CELLS NFR BLD: 31 % (ref 28–63)
CD3+CD4+ CELLS/CD3+CD8+ CLL BLD: 2.99 % (ref 1.4–2.6)
CD3+CD8+ CELLS # BLD: 33 CELLS/UL (ref 125–1312)
CD3+CD8+ CELLS NFR BLD: 10 % (ref 10–40)
CD3-CD16+CD56+ CELLS # BLD: 147 CELLS/UL (ref 95–640)
CD3-CD16+CD56+ CELLS NFR BLD: 46 % (ref 4–25)
CHLORIDE BLD-SCNC: 108 MMOL/L (ref 94–109)
CO2 SERPL-SCNC: 28 MMOL/L (ref 20–32)
CREAT SERPL-MCNC: 1.05 MG/DL (ref 0.66–1.25)
EOSINOPHIL # BLD MANUAL: 0 10E3/UL (ref 0–0.7)
EOSINOPHIL NFR BLD MANUAL: 1 %
ERYTHROCYTE [DISTWIDTH] IN BLOOD BY AUTOMATED COUNT: 15.9 % (ref 10–15)
GFR SERPL CREATININE-BSD FRML MDRD: 82 ML/MIN/1.73M2
GLUCOSE BLD-MCNC: 109 MG/DL (ref 70–99)
HCT VFR BLD AUTO: 30.9 % (ref 40–53)
HGB BLD-MCNC: 10.4 G/DL (ref 13.3–17.7)
LDH SERPL L TO P-CCNC: 259 U/L (ref 85–227)
LYMPHOCYTES # BLD MANUAL: 0.4 10E3/UL (ref 0.8–5.3)
LYMPHOCYTES NFR BLD MANUAL: 16 %
MAGNESIUM SERPL-MCNC: 2.2 MG/DL (ref 1.6–2.3)
MCH RBC QN AUTO: 32.8 PG (ref 26.5–33)
MCHC RBC AUTO-ENTMCNC: 33.7 G/DL (ref 31.5–36.5)
MCV RBC AUTO: 98 FL (ref 78–100)
MONOCYTES # BLD MANUAL: 0.2 10E3/UL (ref 0–1.3)
MONOCYTES NFR BLD MANUAL: 10 %
NEUTROPHILS # BLD MANUAL: 1.6 10E3/UL (ref 1.6–8.3)
NEUTROPHILS NFR BLD MANUAL: 72 %
PHOSPHATE SERPL-MCNC: 2.8 MG/DL (ref 2.5–4.5)
PLAT MORPH BLD: ABNORMAL
PLATELET # BLD AUTO: 83 10E3/UL (ref 150–450)
POTASSIUM BLD-SCNC: 4 MMOL/L (ref 3.4–5.3)
PROT SERPL-MCNC: 6.6 G/DL (ref 6.8–8.8)
RBC # BLD AUTO: 3.17 10E6/UL (ref 4.4–5.9)
RBC MORPH BLD: ABNORMAL
SODIUM SERPL-SCNC: 140 MMOL/L (ref 133–144)
T CELL EXTENDED COMMENT: ABNORMAL
WBC # BLD AUTO: 2.2 10E3/UL (ref 4–11)

## 2022-03-17 PROCEDURE — 85027 COMPLETE CBC AUTOMATED: CPT

## 2022-03-17 PROCEDURE — 94642 AEROSOL INHALATION TREATMENT: CPT | Performed by: INTERNAL MEDICINE

## 2022-03-17 PROCEDURE — 83615 LACTATE (LD) (LDH) ENZYME: CPT

## 2022-03-17 PROCEDURE — 86355 B CELLS TOTAL COUNT: CPT | Performed by: PHYSICIAN ASSISTANT

## 2022-03-17 PROCEDURE — 99214 OFFICE O/P EST MOD 30 MIN: CPT | Mod: GC

## 2022-03-17 PROCEDURE — 36591 DRAW BLOOD OFF VENOUS DEVICE: CPT

## 2022-03-17 PROCEDURE — 96372 THER/PROPH/DIAG INJ SC/IM: CPT

## 2022-03-17 PROCEDURE — 82784 ASSAY IGA/IGD/IGG/IGM EACH: CPT | Performed by: PHYSICIAN ASSISTANT

## 2022-03-17 PROCEDURE — 94640 AIRWAY INHALATION TREATMENT: CPT | Performed by: INTERNAL MEDICINE

## 2022-03-17 PROCEDURE — G0463 HOSPITAL OUTPT CLINIC VISIT: HCPCS | Mod: 25

## 2022-03-17 PROCEDURE — 84100 ASSAY OF PHOSPHORUS: CPT | Performed by: PHYSICIAN ASSISTANT

## 2022-03-17 PROCEDURE — 83735 ASSAY OF MAGNESIUM: CPT | Performed by: PHYSICIAN ASSISTANT

## 2022-03-17 PROCEDURE — 250N000011 HC RX IP 250 OP 636

## 2022-03-17 PROCEDURE — 80053 COMPREHEN METABOLIC PANEL: CPT

## 2022-03-17 RX ORDER — EPINEPHRINE 1 MG/ML
0.3 INJECTION, SOLUTION INTRAMUSCULAR; SUBCUTANEOUS EVERY 5 MIN PRN
Status: CANCELLED | OUTPATIENT
Start: 2022-03-17

## 2022-03-17 RX ORDER — MEPERIDINE HYDROCHLORIDE 25 MG/ML
25 INJECTION INTRAMUSCULAR; INTRAVENOUS; SUBCUTANEOUS EVERY 30 MIN PRN
Status: CANCELLED | OUTPATIENT
Start: 2022-04-14

## 2022-03-17 RX ORDER — DIPHENHYDRAMINE HYDROCHLORIDE 50 MG/ML
50 INJECTION INTRAMUSCULAR; INTRAVENOUS
Status: CANCELLED
Start: 2022-04-14

## 2022-03-17 RX ORDER — ALBUTEROL SULFATE 0.83 MG/ML
2.5 SOLUTION RESPIRATORY (INHALATION) ONCE
Status: CANCELLED
Start: 2022-04-14 | End: 2022-04-14

## 2022-03-17 RX ORDER — ALBUTEROL SULFATE 90 UG/1
1-2 AEROSOL, METERED RESPIRATORY (INHALATION)
Status: CANCELLED
Start: 2022-04-14

## 2022-03-17 RX ORDER — MEPERIDINE HYDROCHLORIDE 25 MG/ML
25 INJECTION INTRAMUSCULAR; INTRAVENOUS; SUBCUTANEOUS EVERY 30 MIN PRN
Status: CANCELLED | OUTPATIENT
Start: 2022-03-17

## 2022-03-17 RX ORDER — ALBUTEROL SULFATE 0.83 MG/ML
2.5 SOLUTION RESPIRATORY (INHALATION) ONCE
Status: COMPLETED | OUTPATIENT
Start: 2022-03-17 | End: 2022-03-17

## 2022-03-17 RX ORDER — ALBUTEROL SULFATE 0.83 MG/ML
2.5 SOLUTION RESPIRATORY (INHALATION) ONCE
Status: CANCELLED
Start: 2022-03-17 | End: 2022-03-17

## 2022-03-17 RX ORDER — HEPARIN SODIUM,PORCINE 10 UNIT/ML
2 VIAL (ML) INTRAVENOUS
Status: CANCELLED | OUTPATIENT
Start: 2022-04-14

## 2022-03-17 RX ORDER — METHYLPREDNISOLONE SODIUM SUCCINATE 125 MG/2ML
125 INJECTION, POWDER, LYOPHILIZED, FOR SOLUTION INTRAMUSCULAR; INTRAVENOUS
Status: CANCELLED
Start: 2022-03-17

## 2022-03-17 RX ORDER — EPINEPHRINE 1 MG/ML
0.3 INJECTION, SOLUTION, CONCENTRATE INTRAVENOUS EVERY 5 MIN PRN
Status: CANCELLED | OUTPATIENT
Start: 2022-04-14

## 2022-03-17 RX ORDER — PENTAMIDINE ISETHIONATE 300 MG/300MG
300 INHALANT RESPIRATORY (INHALATION) ONCE
Status: COMPLETED | OUTPATIENT
Start: 2022-03-17 | End: 2022-03-17

## 2022-03-17 RX ORDER — ALBUTEROL SULFATE 0.83 MG/ML
2.5 SOLUTION RESPIRATORY (INHALATION)
Status: CANCELLED | OUTPATIENT
Start: 2022-04-14

## 2022-03-17 RX ORDER — ALBUTEROL SULFATE 0.83 MG/ML
2.5 SOLUTION RESPIRATORY (INHALATION)
Status: CANCELLED | OUTPATIENT
Start: 2022-03-17

## 2022-03-17 RX ORDER — NALOXONE HYDROCHLORIDE 0.4 MG/ML
0.2 INJECTION, SOLUTION INTRAMUSCULAR; INTRAVENOUS; SUBCUTANEOUS
Status: CANCELLED | OUTPATIENT
Start: 2022-04-14

## 2022-03-17 RX ORDER — NALOXONE HYDROCHLORIDE 0.4 MG/ML
0.2 INJECTION, SOLUTION INTRAMUSCULAR; INTRAVENOUS; SUBCUTANEOUS
Status: CANCELLED | OUTPATIENT
Start: 2022-03-17

## 2022-03-17 RX ORDER — ALBUTEROL SULFATE 90 UG/1
1-2 AEROSOL, METERED RESPIRATORY (INHALATION)
Status: CANCELLED
Start: 2022-03-17

## 2022-03-17 RX ORDER — METHYLPREDNISOLONE SODIUM SUCCINATE 125 MG/2ML
125 INJECTION, POWDER, LYOPHILIZED, FOR SOLUTION INTRAMUSCULAR; INTRAVENOUS
Status: CANCELLED
Start: 2022-04-14

## 2022-03-17 RX ORDER — HEPARIN SODIUM (PORCINE) LOCK FLUSH IV SOLN 100 UNIT/ML 100 UNIT/ML
5 SOLUTION INTRAVENOUS ONCE
Status: COMPLETED | OUTPATIENT
Start: 2022-03-17 | End: 2022-03-17

## 2022-03-17 RX ORDER — DIPHENHYDRAMINE HYDROCHLORIDE 50 MG/ML
50 INJECTION INTRAMUSCULAR; INTRAVENOUS
Status: CANCELLED
Start: 2022-03-17

## 2022-03-17 RX ORDER — PENTAMIDINE ISETHIONATE 300 MG/300MG
300 INHALANT RESPIRATORY (INHALATION) ONCE
Status: CANCELLED
Start: 2022-04-14 | End: 2022-04-14

## 2022-03-17 RX ORDER — PENTAMIDINE ISETHIONATE 300 MG/300MG
300 INHALANT RESPIRATORY (INHALATION) ONCE
Status: CANCELLED
Start: 2022-03-17 | End: 2022-03-17

## 2022-03-17 RX ADMIN — Medication: at 11:39

## 2022-03-17 RX ADMIN — Medication 5 ML: at 09:53

## 2022-03-17 RX ADMIN — PENTAMIDINE ISETHIONATE 300 MG: 300 INHALANT RESPIRATORY (INHALATION) at 13:06

## 2022-03-17 RX ADMIN — ALBUTEROL SULFATE 2.5 MG: 0.83 SOLUTION RESPIRATORY (INHALATION) at 13:04

## 2022-03-17 ASSESSMENT — PAIN SCALES - GENERAL: PAINLEVEL: NO PAIN (1)

## 2022-03-17 NOTE — PROGRESS NOTES
Talked to patient and evaluated by me. We discussed the risks and benefits of receiving EVUSHELD, as well as alternative treatments. The Emergency Use Administration (EUA) was provided to the patient for review and an opportunity for questions was provided. Patient wishes to proceed with EVUSHELD.    Dayday Butt MD  Hematology/Oncology/Transplant Fellow

## 2022-03-17 NOTE — NURSING NOTE
"Oncology Rooming Note    March 17, 2022 10:05 AM   Juvenal Blake is a 56 year old male who presents for:    Chief Complaint   Patient presents with     Oncology Clinic Visit     Lymphoma     Port Draw     Labs drawn by RN via port, vitals done.     Initial Vitals: /66   Pulse 63   Temp 98.8  F (37.1  C)   Resp 16   Wt 109.4 kg (241 lb 1.6 oz)   SpO2 99%   BMI 37.18 kg/m   Estimated body mass index is 37.18 kg/m  as calculated from the following:    Height as of 1/17/22: 1.715 m (5' 7.52\").    Weight as of this encounter: 109.4 kg (241 lb 1.6 oz). Body surface area is 2.28 meters squared.  No Pain (1) Comment: Data Unavailable   No LMP for male patient.  Allergies reviewed: Yes  Medications reviewed: Yes    Medications: MEDICATION REFILLS NEEDED TODAY. Provider was notified.     PT NEEDS A REFILL ON ACYCLOVIR     Pharmacy name entered into [employer]: [id number] - [place], [place] - [address] AT [facility]    Clinical concerns: None    Carlos Alberto Powell            "

## 2022-03-17 NOTE — LETTER
3/17/2022         RE: Juvenal Blake  [address] [place] [address]        Dear Colleague,    Thank you for referring your patient, Juvenal Blake, to the [facility]PL[facility] [place]. Please see a copy of my visit note below.    BMT/Cell Therapy Daily Progress Note      Patient ID:  Juvenal Blake is a 56 year old male, currently day +59 s/p Yescarta CAR-T.      Diagnosis NHLFL Non-Hodgkin lymphoma, Follicular, predominantly large cell  BMTCT Type Cellular Therapy    Prep Regimen Fludarabine, Cytoxan  Donor Source Autologous     GVHD Prophylaxis NA  Primary BMT MD Dr. Irwin    Clinical Trials   2017-45            INTERVAL  HISTORY     Juvenal presents alone today and reports he is doing well.  He is active at home, energy level and appetite are both good.  He denies any acute concerns including fevers/chills, lumps/bumps, night sweats, unintentional weight loss, shortness of breath, or chest pain.     Review of Systems: 8 point ROS negative except as noted above.        PHYSICAL EXAM      KPS:  90  /66   Pulse 63   Temp 98.8  F (37.1  C)   Resp 16   Wt 109.4 kg (241 lb 1.6 oz)   SpO2 99%   BMI 37.18 kg/m       Wt Readings from Last 4 Encounters:   03/17/22 109.4 kg (241 lb 1.6 oz)   02/17/22 107.5 kg (237 lb 1.6 oz)   02/14/22 106.4 kg (234 lb 8 oz)   02/07/22 103.4 kg (228 lb)     General: NAD, sitting up on exam table  Eyes: sclera anicteric   Lungs: clear to auscultation bilaterally  Cardiovascular: RRR, no M/R/G   Lymphatics: no edema, no cervical/supraclavicular LAD  Skin: No rashs in visualized areas  Ext: No LE edema  Neuro: Grossly intact, normal gait        LABS AND IMAGING: I have assessed all abnormal lab values for their clinical significance and any values considered clinically significant have been addressed in the assessment and plan.       Lab Results   Component Value Date    WBC 2.2 (L) 03/17/2022    ANEU 1.6 03/17/2022    HGB 10.4 (L)  03/17/2022    HCT 30.9 (L) 03/17/2022    PLT 83 (L) 03/17/2022     03/17/2022    POTASSIUM 4.0 03/17/2022    CHLORIDE 108 03/17/2022    CO2 28 03/17/2022     (H) 03/17/2022    BUN 13 03/17/2022    CR 1.05 03/17/2022    MAG 2.2 03/17/2022    INR 0.96 02/17/2022    AST 26 03/17/2022    ALT 36 03/17/2022       Assessment:  Juvenal Blake is a 56 year old male PMH significant for refractory NHL, Day +59 of Yescarta CAR-T therapy.     1. Follicular Lymphoma/Yescarta CAR-T:    Relapsed after BR, OCHOP, NAM NK (9/1/20), idelalisib (~4-6/2020),  (6/28/21), polatuzumab (12/21/21). (hx Rituxan reaction).  Restaging PET 2/14 showed CR.  Clinically doing well today with stable anemia/thrombocytopenia.  Has persistent leukopenia with lymphopenia likely secondary to CAR-T therapy.  LDH is mildly elevated at 259.  Absolute CD4 is 99.  Repeat PET for D+100 in approximately 1 month.       2. HEME: Keep hgb >7g, plt>10.    3. ID:  No active issues.     #Prophy: acyclovir.  Inhaled pentamadine monthly, given today.  #S/p 3 covid shots; first two were pre- and booster was after.  No need for vaccination  post CART given he did not have auto-trasplant .        4. GI:    - omeprazole for acid reflux  - hx medical cannibis     5. Renal/FEN: Creat, lytes wnl.  Avoid NSAIDs.    6. CV:    History of stable angina and NSTEMI type II.  Evaluated by cardiology prior to transplant, CTA coronary artery and echo completed.  No intervention recommended prior to cellular therapy.    7. :   #hx Prostatic adenocarcinoma: s/p radical prostatectomy and bilateral lymph node dissection 11/2017. Completed radiation to the prostate fossa and pelvic lymph nodes at [facility] early May 2018. He received 4500 cGy in 25 fractions. He then had 2340 cGy boost in 13 fractions to the prostatic fossa, for a total dose of 6240 cGy in 38 treatment fractions delivered over 54 days. He did not receive hormonal therapy:    - PSA 1.21 on  1/25/22, 0.71 on 9/10/21     8. Muscle/tendon aches:  Resolved off levaquin.      Plan:  - RTC in 1 month for D+100, sooner if needed    Patient seen with attending Dr. Terry.    Dayday Butt MD  Hematology/Oncology/Transplant Fellow      Attestation signed by Rosa Terry MD at 3/21/2022  9:37 PM:  Today, I  saw and examined the patient independently in clinic and discussed the recommendations. I reviewed the case with the fellow and agree with the note as above.   Scot is in CR following Yescarta- fully recovered. We recc to find PCP and cont recovery including Acyclovir, PCP prophylaxis Pentmidine inhaled and he is s/p Evusheld     Talked to patient and evaluated by me. We discussed the risks and benefits of receiving EVUSHELD, as well as alternative treatments. The Emergency Use Administration (EUA) was provided to the patient for review and an opportunity for questions was provided. Patient wishes to proceed with EVUSHELD.    Dayday Butt MD  Hematology/Oncology/Transplant Fellow        Again, thank you for allowing me to participate in the care of your patient.      Sincerely,    Rosa Terry MD

## 2022-03-17 NOTE — NURSING NOTE
The following medication was given:     MEDICATION: EVUSHELD   ROUTE: IM  SITE: Ventrogluteal - Right & Left   DOSE: 300 ml of each component      See MAR for detail.    Jodi Thorne RN

## 2022-03-17 NOTE — PROGRESS NOTES
Patient was seen today for a Pentamidine nebulizer tx ordered by Dr. Rosa Terry MD.    Patient was first given 2.5 mg of Albuterol nebulizer, after which Pentamidine 300 mg (Lot # [id number]) mixed with 6cc Sterile H20 was administered through a filtered nebulizer.    Pre-treatment: SpO2 = 100%   HR =62  BBS = clear  Post-treatment: SpO2 = 100%  HR = 64  BBS = clear    No adverse side effects noted by the patient.    This service today was provided under the supervision of Dr. Cony Fernandez MD, who was available if needed.     Procedure was completed by Lacy Ott.

## 2022-03-17 NOTE — NURSING NOTE
Chief Complaint   Patient presents with     Oncology Clinic Visit     Lymphoma     Port Draw     Labs drawn by RN via port, vitals done.     Port accessed with 20 gauge 3/4 inch gripper needle by RN, labs collected, line flushed with saline and heparin.  Vitals taken. Pt checked in for appointment(s).    Jessica Hendricks RN

## 2022-03-18 LAB
CMV DNA SPEC NAA+PROBE-ACNC: NOT DETECTED IU/ML
IGG SERPL-MCNC: 390 MG/DL (ref 610–1616)

## 2022-04-12 DIAGNOSIS — Z85.46 HISTORY OF MALIGNANT NEOPLASM OF PROSTATE: Primary | ICD-10-CM

## 2022-04-15 ENCOUNTER — HOSPITAL ENCOUNTER (OUTPATIENT)
Dept: PET IMAGING | Facility: CLINIC | Age: 57
Discharge: HOME | End: 2022-04-15
Attending: PHYSICIAN ASSISTANT
Payer: COMMERCIAL

## 2022-04-15 DIAGNOSIS — C82.00 FOLLICULAR LYMPHOMA GRADE I, UNSPECIFIED BODY REGION (H): ICD-10-CM

## 2022-04-15 DIAGNOSIS — C82.08 FOLLICULAR LYMPHOMA GRADE I, LYMPH NODES OF MULTIPLE SITES (H): ICD-10-CM

## 2022-04-15 PROCEDURE — 343N000001 HC RX 343: Performed by: PHYSICIAN ASSISTANT

## 2022-04-15 PROCEDURE — 70491 CT SOFT TISSUE NECK W/DYE: CPT | Mod: 26 | Performed by: STUDENT IN AN ORGANIZED HEALTH CARE EDUCATION/TRAINING PROGRAM

## 2022-04-15 PROCEDURE — 78816 PET IMAGE W/CT FULL BODY: CPT | Mod: 26 | Performed by: STUDENT IN AN ORGANIZED HEALTH CARE EDUCATION/TRAINING PROGRAM

## 2022-04-15 PROCEDURE — 71260 CT THORAX DX C+: CPT | Mod: 26 | Performed by: STUDENT IN AN ORGANIZED HEALTH CARE EDUCATION/TRAINING PROGRAM

## 2022-04-15 PROCEDURE — 74177 CT ABD & PELVIS W/CONTRAST: CPT

## 2022-04-15 PROCEDURE — 74177 CT ABD & PELVIS W/CONTRAST: CPT | Mod: 26 | Performed by: STUDENT IN AN ORGANIZED HEALTH CARE EDUCATION/TRAINING PROGRAM

## 2022-04-15 PROCEDURE — 70491 CT SOFT TISSUE NECK W/DYE: CPT

## 2022-04-15 PROCEDURE — 250N000011 HC RX IP 250 OP 636: Performed by: PHYSICIAN ASSISTANT

## 2022-04-15 PROCEDURE — A9552 F18 FDG: HCPCS | Performed by: PHYSICIAN ASSISTANT

## 2022-04-15 PROCEDURE — 78816 PET IMAGE W/CT FULL BODY: CPT | Mod: PS

## 2022-04-15 RX ORDER — IOPAMIDOL 755 MG/ML
60-135 INJECTION, SOLUTION INTRAVASCULAR ONCE
Status: COMPLETED | OUTPATIENT
Start: 2022-04-15 | End: 2022-04-15

## 2022-04-15 RX ADMIN — FLUDEOXYGLUCOSE F-18 14.48 MCI.: 500 INJECTION, SOLUTION INTRAVENOUS at 09:17

## 2022-04-15 RX ADMIN — IOPAMIDOL 135 ML: 755 INJECTION, SOLUTION INTRAVENOUS at 10:11

## 2022-04-18 ENCOUNTER — OFFICE VISIT (OUTPATIENT)
Dept: TRANSPLANT | Facility: CLINIC | Age: 57
End: 2022-04-18
Payer: COMMERCIAL

## 2022-04-18 ENCOUNTER — APPOINTMENT (OUTPATIENT)
Dept: LAB | Facility: CLINIC | Age: 57
End: 2022-04-18
Payer: COMMERCIAL

## 2022-04-18 VITALS
DIASTOLIC BLOOD PRESSURE: 78 MMHG | HEART RATE: 76 BPM | OXYGEN SATURATION: 99 % | WEIGHT: 245 LBS | BODY MASS INDEX: 37.78 KG/M2 | SYSTOLIC BLOOD PRESSURE: 137 MMHG | RESPIRATION RATE: 18 BRPM | TEMPERATURE: 97.7 F

## 2022-04-18 DIAGNOSIS — Z85.46 HISTORY OF MALIGNANT NEOPLASM OF PROSTATE: ICD-10-CM

## 2022-04-18 DIAGNOSIS — C82.08 FOLLICULAR LYMPHOMA GRADE I, LYMPH NODES OF MULTIPLE SITES (H): ICD-10-CM

## 2022-04-18 DIAGNOSIS — C83.398 DIFFUSE LARGE B-CELL LYMPHOMA OF EXTRANODAL SITE EXCLUDING SPLEEN AND OTHER SOLID ORGANS: Primary | ICD-10-CM

## 2022-04-18 DIAGNOSIS — C82.08 FOLLICULAR LYMPHOMA GRADE I, LYMPH NODES OF MULTIPLE SITES (H): Primary | ICD-10-CM

## 2022-04-18 LAB
ALBUMIN SERPL-MCNC: 3.8 G/DL (ref 3.4–5)
ALP SERPL-CCNC: 82 U/L (ref 40–150)
ALT SERPL W P-5'-P-CCNC: 36 U/L (ref 0–70)
ANION GAP SERPL CALCULATED.3IONS-SCNC: 7 MMOL/L (ref 3–14)
AST SERPL W P-5'-P-CCNC: 25 U/L (ref 0–45)
BASOPHILS # BLD MANUAL: 0 10E3/UL (ref 0–0.2)
BASOPHILS NFR BLD MANUAL: 0 %
BILIRUB SERPL-MCNC: 0.5 MG/DL (ref 0.2–1.3)
BUN SERPL-MCNC: 13 MG/DL (ref 7–30)
CALCIUM SERPL-MCNC: 9 MG/DL (ref 8.5–10.1)
CD19 CELLS # BLD: 34 CELLS/UL (ref 107–698)
CD19 CELLS NFR BLD: 9 % (ref 6–27)
CD3 CELLS # BLD: 160 CELLS/UL (ref 603–2990)
CD3 CELLS NFR BLD: 43 % (ref 49–84)
CD3+CD4+ CELLS # BLD: 123 CELLS/UL (ref 441–2156)
CD3+CD4+ CELLS NFR BLD: 33 % (ref 28–63)
CD3+CD4+ CELLS/CD3+CD8+ CLL BLD: 3.22 % (ref 1.4–2.6)
CD3+CD8+ CELLS # BLD: 38 CELLS/UL (ref 125–1312)
CD3+CD8+ CELLS NFR BLD: 10 % (ref 10–40)
CD3-CD16+CD56+ CELLS # BLD: 169 CELLS/UL (ref 95–640)
CD3-CD16+CD56+ CELLS NFR BLD: 45 % (ref 4–25)
CHLORIDE BLD-SCNC: 107 MMOL/L (ref 94–109)
CO2 SERPL-SCNC: 27 MMOL/L (ref 20–32)
CREAT SERPL-MCNC: 1.16 MG/DL (ref 0.66–1.25)
EOSINOPHIL # BLD MANUAL: 0.1 10E3/UL (ref 0–0.7)
EOSINOPHIL NFR BLD MANUAL: 2 %
ERYTHROCYTE [DISTWIDTH] IN BLOOD BY AUTOMATED COUNT: 15.2 % (ref 10–15)
GFR SERPL CREATININE-BSD FRML MDRD: 73 ML/MIN/1.73M2
GLUCOSE BLD-MCNC: 113 MG/DL (ref 70–99)
HCT VFR BLD AUTO: 30.1 % (ref 40–53)
HGB BLD-MCNC: 10.2 G/DL (ref 13.3–17.7)
LYMPHOCYTES # BLD MANUAL: 0.3 10E3/UL (ref 0.8–5.3)
LYMPHOCYTES NFR BLD MANUAL: 9 %
MAGNESIUM SERPL-MCNC: 1.9 MG/DL (ref 1.6–2.3)
MCH RBC QN AUTO: 33.8 PG (ref 26.5–33)
MCHC RBC AUTO-ENTMCNC: 33.9 G/DL (ref 31.5–36.5)
MCV RBC AUTO: 100 FL (ref 78–100)
MONOCYTES # BLD MANUAL: 0.1 10E3/UL (ref 0–1.3)
MONOCYTES NFR BLD MANUAL: 2 %
NEUTROPHILS # BLD MANUAL: 3.1 10E3/UL (ref 1.6–8.3)
NEUTROPHILS NFR BLD MANUAL: 87 %
PHOSPHATE SERPL-MCNC: 2.7 MG/DL (ref 2.5–4.5)
PLAT MORPH BLD: ABNORMAL
PLATELET # BLD AUTO: 78 10E3/UL (ref 150–450)
POTASSIUM BLD-SCNC: 4 MMOL/L (ref 3.4–5.3)
PROT SERPL-MCNC: 6.3 G/DL (ref 6.8–8.8)
RBC # BLD AUTO: 3.02 10E6/UL (ref 4.4–5.9)
RBC MORPH BLD: ABNORMAL
SODIUM SERPL-SCNC: 141 MMOL/L (ref 133–144)
T CELL EXTENDED COMMENT: ABNORMAL
WBC # BLD AUTO: 3.6 10E3/UL (ref 4–11)

## 2022-04-18 PROCEDURE — 82784 ASSAY IGA/IGD/IGG/IGM EACH: CPT | Performed by: PHYSICIAN ASSISTANT

## 2022-04-18 PROCEDURE — 94640 AIRWAY INHALATION TREATMENT: CPT | Performed by: INTERNAL MEDICINE

## 2022-04-18 PROCEDURE — 84153 ASSAY OF PSA TOTAL: CPT

## 2022-04-18 PROCEDURE — 94642 AEROSOL INHALATION TREATMENT: CPT | Performed by: INTERNAL MEDICINE

## 2022-04-18 PROCEDURE — 36591 DRAW BLOOD OFF VENOUS DEVICE: CPT | Performed by: PHYSICIAN ASSISTANT

## 2022-04-18 PROCEDURE — 85027 COMPLETE CBC AUTOMATED: CPT | Performed by: PHYSICIAN ASSISTANT

## 2022-04-18 PROCEDURE — 83735 ASSAY OF MAGNESIUM: CPT | Performed by: PHYSICIAN ASSISTANT

## 2022-04-18 PROCEDURE — 99214 OFFICE O/P EST MOD 30 MIN: CPT

## 2022-04-18 PROCEDURE — 86360 T CELL ABSOLUTE COUNT/RATIO: CPT | Performed by: PHYSICIAN ASSISTANT

## 2022-04-18 PROCEDURE — 80053 COMPREHEN METABOLIC PANEL: CPT | Performed by: PHYSICIAN ASSISTANT

## 2022-04-18 PROCEDURE — G0463 HOSPITAL OUTPT CLINIC VISIT: HCPCS

## 2022-04-18 PROCEDURE — 250N000011 HC RX IP 250 OP 636

## 2022-04-18 PROCEDURE — 84100 ASSAY OF PHOSPHORUS: CPT | Performed by: PHYSICIAN ASSISTANT

## 2022-04-18 RX ORDER — ALBUTEROL SULFATE 0.83 MG/ML
2.5 SOLUTION RESPIRATORY (INHALATION) ONCE
Status: CANCELLED
Start: 2022-05-12 | End: 2022-05-12

## 2022-04-18 RX ORDER — EPINEPHRINE 1 MG/ML
0.3 INJECTION, SOLUTION, CONCENTRATE INTRAVENOUS EVERY 5 MIN PRN
Status: CANCELLED | OUTPATIENT
Start: 2022-05-12

## 2022-04-18 RX ORDER — ALBUTEROL SULFATE 0.83 MG/ML
2.5 SOLUTION RESPIRATORY (INHALATION) ONCE
Status: COMPLETED | OUTPATIENT
Start: 2022-04-18 | End: 2022-04-18

## 2022-04-18 RX ORDER — ALBUTEROL SULFATE 0.83 MG/ML
2.5 SOLUTION RESPIRATORY (INHALATION)
Status: CANCELLED | OUTPATIENT
Start: 2022-05-12

## 2022-04-18 RX ORDER — METHYLPREDNISOLONE SODIUM SUCCINATE 125 MG/2ML
125 INJECTION, POWDER, LYOPHILIZED, FOR SOLUTION INTRAMUSCULAR; INTRAVENOUS
Status: CANCELLED
Start: 2022-05-12

## 2022-04-18 RX ORDER — DIPHENHYDRAMINE HYDROCHLORIDE 50 MG/ML
50 INJECTION INTRAMUSCULAR; INTRAVENOUS
Status: CANCELLED
Start: 2022-05-12

## 2022-04-18 RX ORDER — PENTAMIDINE ISETHIONATE 300 MG/300MG
300 INHALANT RESPIRATORY (INHALATION) ONCE
Status: COMPLETED | OUTPATIENT
Start: 2022-04-18 | End: 2022-04-18

## 2022-04-18 RX ORDER — HEPARIN SODIUM (PORCINE) LOCK FLUSH IV SOLN 100 UNIT/ML 100 UNIT/ML
5 SOLUTION INTRAVENOUS EVERY 8 HOURS
Status: DISCONTINUED | OUTPATIENT
Start: 2022-04-18 | End: 2022-04-18 | Stop reason: HOSPADM

## 2022-04-18 RX ORDER — ALBUTEROL SULFATE 90 UG/1
1-2 AEROSOL, METERED RESPIRATORY (INHALATION)
Status: CANCELLED
Start: 2022-05-12

## 2022-04-18 RX ORDER — PENTAMIDINE ISETHIONATE 300 MG/300MG
300 INHALANT RESPIRATORY (INHALATION) ONCE
Status: CANCELLED
Start: 2022-05-12 | End: 2022-05-12

## 2022-04-18 RX ORDER — MEPERIDINE HYDROCHLORIDE 25 MG/ML
25 INJECTION INTRAMUSCULAR; INTRAVENOUS; SUBCUTANEOUS EVERY 30 MIN PRN
Status: CANCELLED | OUTPATIENT
Start: 2022-05-12

## 2022-04-18 RX ORDER — NALOXONE HYDROCHLORIDE 0.4 MG/ML
0.2 INJECTION, SOLUTION INTRAMUSCULAR; INTRAVENOUS; SUBCUTANEOUS
Status: CANCELLED | OUTPATIENT
Start: 2022-05-12

## 2022-04-18 RX ADMIN — Medication 5 ML: at 12:58

## 2022-04-18 RX ADMIN — PENTAMIDINE ISETHIONATE 300 MG: 300 INHALANT RESPIRATORY (INHALATION) at 13:59

## 2022-04-18 RX ADMIN — ALBUTEROL SULFATE 2.5 MG: 0.83 SOLUTION RESPIRATORY (INHALATION) at 13:58

## 2022-04-18 ASSESSMENT — PAIN SCALES - GENERAL: PAINLEVEL: NO PAIN (0)

## 2022-04-18 NOTE — LETTER
4/18/2022         RE: Juvenal Blake  [address] [place] [address]        Dear Colleague,    Thank you for referring your patient, Juvenal Blake, to the [facility] BLOOD AND MARROW TRANSPLANT PROGRAM [place]. Please see a copy of my visit note below.    BMT/Cell Therapy Daily Progress Note      Patient ID:  Juvenal Blake is a 56 year old male, currently day +100 s/p Yescarta CAR-T.      Diagnosis NHLFL Non-Hodgkin lymphoma, Follicular, predominantly large cell  BMTCT Type Cellular Therapy    Prep Regimen Fludarabine, Cytoxan  Donor Source Autologous     GVHD Prophylaxis NA  Primary BMT MD Dr. Terry     Clinical Trials   2017-45            INTERVAL  HISTORY     Juvenal presents alone today and reports he is doing well. Energy  Is low but no acute issues.     He is on disability, not working now, appetite are both good.  He denies any acute concerns including fevers/chills, lumps/bumps, night sweats, unintentional weight loss, shortness of breath, or chest pain.     Review of Systems: 8 point ROS negative except as noted above.        PHYSICAL EXAM      KPS:  90  /78   Pulse 76   Temp 97.7  F (36.5  C)   Resp 18   Wt 111.1 kg (245 lb)   SpO2 99%   BMI 37.78 kg/m       Wt Readings from Last 4 Encounters:   04/18/22 111.1 kg (245 lb)   03/17/22 109.4 kg (241 lb 1.6 oz)   02/17/22 107.5 kg (237 lb 1.6 oz)   02/14/22 106.4 kg (234 lb 8 oz)     General: NAD, sitting up on exam table  Eyes: sclera anicteric   Lungs: clear to auscultation bilaterally  Cardiovascular: RRR, no M/R/G   Lymphatics: no edema, no cervical/supraclavicular LAD  Skin: No rashs in visualized areas  Ext: No LE edema  Neuro: Grossly intact, normal gait        LABS AND IMAGING: I have assessed all abnormal lab values for their clinical significance and any values considered clinically significant have been addressed in the assessment and plan.       Lab Results   Component Value Date    WBC 3.6 (L) 04/18/2022     ANEU 1.6 03/17/2022    HGB 10.2 (L) 04/18/2022    HCT 30.1 (L) 04/18/2022    PLT 78 (L) 04/18/2022     04/18/2022    POTASSIUM 4.0 04/18/2022    CHLORIDE 107 04/18/2022    CO2 28 03/17/2022     (H) 03/17/2022    BUN 13 03/17/2022    CR 1.05 03/17/2022    MAG 2.2 03/17/2022    INR 0.96 02/17/2022    AST 26 03/17/2022    ALT 36 03/17/2022   PET/CT 4/15/2022                                                                      IMPRESSION: In this patient with a history of follicular lymphoma:     1. Continued complete response by Lugano PET criteria. No recurrent  hypermetabolic adenopathy.     2. Unchanged splenomegaly.     3. Unchanged hypermetabolism at the anus, nonspecific, most commonly  inflammatory such as inflamed hemorrhoids. Direct visualization can be  considered to exclude other lesion if not already performed.     4. Please see separate dictation for the high resolution PET-CT of the  head and neck performed at the same time for further discussion.     Narrative & Impression   PET CT fusion examination 4/15/2022 10:53 AM  1. Neck CT with contrast  2. PET study of the neck  3. PET CT fusion study of the neck         Assessment:  Juvenal Blake is a 56 year old male PMH significant for refractory NHL, Day +100 of Yescarta CAR-T therapy.     1. Follicular Lymphoma/Yescarta CAR-T:    Relapsed after BR, OCHOP, NAM NK (9/1/20), idelalisib (~4-6/2020),  (6/28/21), polatuzumab (12/21/21). (hx Rituxan reaction).  Restaging PET 2/14 showed CR.  Clinically doing well today with stable anemia/thrombocytopenia.  Has persistent leukopenia with lymphopenia likely secondary to CAR-T therapy.  LDH is mildly elevated at 259.  Absolute CD4 is 99.   PET for D+100 - CR !  Next staging at 6 months        2. HEME: Keep hgb >7g, plt>10.    3. ID:  No active issues.     #Prophy: acyclovir.  Inhaled pentamadine monthly, given today.  #S/p 3 covid shots; first two were pre- and booster was after.  No need for  vaccination  post CART given he did not have auto-trasplant .   Evusheld 300mcg given 3/17/2022 -      4. GI:    - omeprazole for acid reflux  - hx medical cannibis     5. Renal/FEN: Creat, lytes wnl.  Avoid NSAIDs.    6. CV:    History of stable angina and NSTEMI type II.  Evaluated by cardiology prior to transplant, CTA coronary artery and echo completed.        7. :   #hx Prostatic adenocarcinoma: s/p radical prostatectomy and bilateral lymph node dissection 11/2017. Completed radiation to the prostate fossa and pelvic lymph nodes at [facility] early May 2018. He received 4500 cGy in 25 fractions. He then had 2340 cGy boost in 13 fractions to the prostatic fossa, for a total dose of 6240 cGy in 38 treatment fractions delivered over 54 days. He did not receive hormonal therapy:    - PSA 1.21 on 1/25/22, 0.71 on 9/10/21     8. Muscle/tendon aches:  Resolved off levaquin.      Plan:  - RTC at 6 months follow-up.   -monthly pentamidine inhaled - schedule     Rosa Terry MD  Professor of Medicine  [phone]          Again, thank you for allowing me to participate in the care of your patient.      Sincerely,    BMT DOM

## 2022-04-18 NOTE — NURSING NOTE
Chief Complaint   Patient presents with     Port Draw     Gripper needle. Heparin locked, vitals checked     Wanda Rose RN on 4/18/2022 at 1:01 PM

## 2022-04-18 NOTE — PROGRESS NOTES
BMT/Cell Therapy Daily Progress Note      Patient ID:  Juvenal Blake is a 56 year old male, currently day +100 s/p Yescarta CAR-T.      Diagnosis NHLFL Non-Hodgkin lymphoma, Follicular, predominantly large cell  BMTCT Type Cellular Therapy    Prep Regimen Fludarabine, Cytoxan  Donor Source Autologous     GVHD Prophylaxis NA  Primary BMT MD Dr. Terry     Clinical Trials   2017-45            INTERVAL  HISTORY     Juvenal presents alone today and reports he is doing well. Energy  Is low but no acute issues.     He is on disability, not working now, appetite are both good.  He denies any acute concerns including fevers/chills, lumps/bumps, night sweats, unintentional weight loss, shortness of breath, or chest pain.     Review of Systems: 8 point ROS negative except as noted above.        PHYSICAL EXAM      KPS:  90  /78   Pulse 76   Temp 97.7  F (36.5  C)   Resp 18   Wt 111.1 kg (245 lb)   SpO2 99%   BMI 37.78 kg/m       Wt Readings from Last 4 Encounters:   04/18/22 111.1 kg (245 lb)   03/17/22 109.4 kg (241 lb 1.6 oz)   02/17/22 107.5 kg (237 lb 1.6 oz)   02/14/22 106.4 kg (234 lb 8 oz)     General: NAD, sitting up on exam table  Eyes: sclera anicteric   Lungs: clear to auscultation bilaterally  Cardiovascular: RRR, no M/R/G   Lymphatics: no edema, no cervical/supraclavicular LAD  Skin: No rashs in visualized areas  Ext: No LE edema  Neuro: Grossly intact, normal gait        LABS AND IMAGING: I have assessed all abnormal lab values for their clinical significance and any values considered clinically significant have been addressed in the assessment and plan.       Lab Results   Component Value Date    WBC 3.6 (L) 04/18/2022    ANEU 1.6 03/17/2022    HGB 10.2 (L) 04/18/2022    HCT 30.1 (L) 04/18/2022    PLT 78 (L) 04/18/2022     04/18/2022    POTASSIUM 4.0 04/18/2022    CHLORIDE 107 04/18/2022    CO2 28 03/17/2022     (H) 03/17/2022    BUN 13 03/17/2022    CR 1.05 03/17/2022    MAG 2.2  03/17/2022    INR 0.96 02/17/2022    AST 26 03/17/2022    ALT 36 03/17/2022   PET/CT 4/15/2022                                                                      IMPRESSION: In this patient with a history of follicular lymphoma:     1. Continued complete response by Lugano PET criteria. No recurrent  hypermetabolic adenopathy.     2. Unchanged splenomegaly.     3. Unchanged hypermetabolism at the anus, nonspecific, most commonly  inflammatory such as inflamed hemorrhoids. Direct visualization can be  considered to exclude other lesion if not already performed.     4. Please see separate dictation for the high resolution PET-CT of the  head and neck performed at the same time for further discussion.     Narrative & Impression   PET CT fusion examination 4/15/2022 10:53 AM  1. Neck CT with contrast  2. PET study of the neck  3. PET CT fusion study of the neck         Assessment:  Juvenal Blake is a 56 year old male PMH significant for refractory NHL, Day +100 of Yescarta CAR-T therapy.     1. Follicular Lymphoma/Yescarta CAR-T:    Relapsed after BR, OCHOP, NAM NK (9/1/20), idelalisib (~4-6/2020),  (6/28/21), polatuzumab (12/21/21). (hx Rituxan reaction).  Restaging PET 2/14 showed CR.  Clinically doing well today with stable anemia/thrombocytopenia.  Has persistent leukopenia with lymphopenia likely secondary to CAR-T therapy.  LDH is mildly elevated at 259.  Absolute CD4 is 99.   PET for D+100 - CR !  Next staging at 6 months        2. HEME: Keep hgb >7g, plt>10.    3. ID:  No active issues.     #Prophy: acyclovir.  Inhaled pentamadine monthly, given today.  #S/p 3 covid shots; first two were pre- and booster was after.  No need for vaccination  post CART given he did not have auto-trasplant .   Evusheld 300mcg given 3/17/2022 -      4. GI:    - omeprazole for acid reflux  - hx medical cannibis     5. Renal/FEN: Creat, lytes wnl.  Avoid NSAIDs.    6. CV:    History of stable angina and NSTEMI type II.   Evaluated by cardiology prior to transplant, CTA coronary artery and echo completed.        7. :   #hx Prostatic adenocarcinoma: s/p radical prostatectomy and bilateral lymph node dissection 11/2017. Completed radiation to the prostate fossa and pelvic lymph nodes at [facility] early May 2018. He received 4500 cGy in 25 fractions. He then had 2340 cGy boost in 13 fractions to the prostatic fossa, for a total dose of 6240 cGy in 38 treatment fractions delivered over 54 days. He did not receive hormonal therapy:    - PSA 1.21 on 1/25/22, 0.71 on 9/10/21     8. Muscle/tendon aches:  Resolved off levaquin.      Plan:  - RTC at 6 months follow-up.   -monthly pentamidine inhaled - schedule     Rosa Terry MD  Professor of Medicine  [phone]

## 2022-04-18 NOTE — NURSING NOTE
"Oncology Rooming Note    April 18, 2022 1:06 PM   Juvenal Blake is a 56 year old male who presents for:    Chief Complaint   Patient presents with     Port Draw     Gripper needle. Heparin locked, vitals checked     Oncology Clinic Visit     DLBCL (diffuse large B cell lymphoma) (H     Initial Vitals: /78   Pulse 76   Temp 97.7  F (36.5  C)   Resp 18   Wt 111.1 kg (245 lb)   SpO2 99%   BMI 37.78 kg/m   Estimated body mass index is 37.78 kg/m  as calculated from the following:    Height as of 1/17/22: 1.715 m (5' 7.52\").    Weight as of this encounter: 111.1 kg (245 lb). Body surface area is 2.3 meters squared.  No Pain (0) Comment: Data Unavailable   No LMP for male patient.  Allergies reviewed: Yes  Medications reviewed: Yes    Medications: Medication refills not needed today.  Pharmacy name entered into [employer]: [id number] - [place], [place] - [address] [facility]    Clinical concerns: Pt here for 3 month BAN review.     Rox Ward CMA            "

## 2022-04-18 NOTE — PROGRESS NOTES
Patient was seen today for a Pentamidine nebulizer tx ordered by Dr. Rosa Terry MD.     Patient was first given 2.5 mg of Albuterol nebulizer, after which Pentamidine 300 mg (Lot # [id number]) mixed with 6cc Sterile H20 was administered through a filtered nebulizer.     Pre-treatment: SpO2 = 98%   HR = 70  BBS = clear  Post-treatment: SpO2 = 99%  HR = 70   BBS = clear     No adverse side effects noted by the patient.     This service today was provided under the supervision of Dr. Emmanuel Huffman MD, who was available if needed.      Procedure was completed by Lacy Ott.

## 2022-04-19 LAB
CMV DNA SPEC NAA+PROBE-ACNC: NOT DETECTED IU/ML
IGG SERPL-MCNC: 326 MG/DL (ref 610–1616)
PSA FREE MFR SERPL: <9 %
PSA FREE SERPL-MCNC: <0.1 NG/ML
PSA SERPL IA-MCNC: 1.1 NG/ML

## 2022-04-25 ENCOUNTER — LAB REQUISITION (OUTPATIENT)
Dept: LAB | Facility: CLINIC | Age: 57
End: 2022-04-25

## 2022-04-25 ENCOUNTER — LAB REQUISITION (OUTPATIENT)
Dept: LAB | Facility: CLINIC | Age: 57
End: 2022-04-25
Payer: COMMERCIAL

## 2022-04-25 DIAGNOSIS — Z03.818 ENCOUNTER FOR OBSERVATION FOR SUSPECTED EXPOSURE TO OTHER BIOLOGICAL AGENTS RULED OUT: ICD-10-CM

## 2022-04-25 LAB
CHOLEST SERPL-MCNC: 192 MG/DL
HDLC SERPL-MCNC: 57 MG/DL
LDLC SERPL CALC-MCNC: 89 MG/DL
TRIGL SERPL-MCNC: 232 MG/DL

## 2022-04-25 PROCEDURE — U0005 INFEC AGEN DETEC AMPLI PROBE: HCPCS | Mod: ORL | Performed by: FAMILY MEDICINE

## 2022-04-25 PROCEDURE — 80061 LIPID PANEL: CPT | Performed by: FAMILY MEDICINE

## 2022-04-25 PROCEDURE — 87081 CULTURE SCREEN ONLY: CPT | Performed by: FAMILY MEDICINE

## 2022-04-26 LAB — SARS-COV-2 RNA RESP QL NAA+PROBE: NEGATIVE

## 2022-04-27 LAB — BACTERIA SPEC CULT: NORMAL

## 2022-05-18 ENCOUNTER — LAB (OUTPATIENT)
Dept: LAB | Facility: CLINIC | Age: 57
End: 2022-05-18
Payer: COMMERCIAL

## 2022-05-18 DIAGNOSIS — C82.08 FOLLICULAR LYMPHOMA GRADE I, LYMPH NODES OF MULTIPLE SITES (H): ICD-10-CM

## 2022-05-18 DIAGNOSIS — C83.398 DIFFUSE LARGE B-CELL LYMPHOMA OF EXTRANODAL SITE EXCLUDING SPLEEN AND OTHER SOLID ORGANS: Primary | ICD-10-CM

## 2022-05-18 LAB
BASOPHILS # BLD AUTO: 0 10E3/UL (ref 0–0.2)
BASOPHILS NFR BLD AUTO: 0 %
EOSINOPHIL # BLD AUTO: 0 10E3/UL (ref 0–0.7)
EOSINOPHIL NFR BLD AUTO: 1 %
ERYTHROCYTE [DISTWIDTH] IN BLOOD BY AUTOMATED COUNT: 15 % (ref 10–15)
HCT VFR BLD AUTO: 32.5 % (ref 40–53)
HGB BLD-MCNC: 11.2 G/DL (ref 13.3–17.7)
IMM GRANULOCYTES # BLD: 0 10E3/UL
IMM GRANULOCYTES NFR BLD: 1 %
LYMPHOCYTES # BLD AUTO: 0.4 10E3/UL (ref 0.8–5.3)
LYMPHOCYTES NFR BLD AUTO: 9 %
MCH RBC QN AUTO: 34.6 PG (ref 26.5–33)
MCHC RBC AUTO-ENTMCNC: 34.5 G/DL (ref 31.5–36.5)
MCV RBC AUTO: 100 FL (ref 78–100)
MONOCYTES # BLD AUTO: 0.5 10E3/UL (ref 0–1.3)
MONOCYTES NFR BLD AUTO: 11 %
NEUTROPHILS # BLD AUTO: 3.5 10E3/UL (ref 1.6–8.3)
NEUTROPHILS NFR BLD AUTO: 78 %
NRBC # BLD AUTO: 0 10E3/UL
NRBC BLD AUTO-RTO: 0 /100
PLATELET # BLD AUTO: 94 10E3/UL (ref 150–450)
RBC # BLD AUTO: 3.24 10E6/UL (ref 4.4–5.9)
WBC # BLD AUTO: 4.5 10E3/UL (ref 4–11)

## 2022-05-18 PROCEDURE — 94640 AIRWAY INHALATION TREATMENT: CPT | Performed by: INTERNAL MEDICINE

## 2022-05-18 PROCEDURE — 94642 AEROSOL INHALATION TREATMENT: CPT | Performed by: INTERNAL MEDICINE

## 2022-05-18 PROCEDURE — 36591 DRAW BLOOD OFF VENOUS DEVICE: CPT

## 2022-05-18 PROCEDURE — 85025 COMPLETE CBC W/AUTO DIFF WBC: CPT

## 2022-05-18 PROCEDURE — 250N000011 HC RX IP 250 OP 636

## 2022-05-18 RX ORDER — ALBUTEROL SULFATE 90 UG/1
1-2 AEROSOL, METERED RESPIRATORY (INHALATION)
Status: CANCELLED
Start: 2022-06-09

## 2022-05-18 RX ORDER — PENTAMIDINE ISETHIONATE 300 MG/300MG
300 INHALANT RESPIRATORY (INHALATION) ONCE
Status: CANCELLED
Start: 2022-06-09 | End: 2022-06-09

## 2022-05-18 RX ORDER — ALBUTEROL SULFATE 0.83 MG/ML
2.5 SOLUTION RESPIRATORY (INHALATION)
Status: CANCELLED | OUTPATIENT
Start: 2022-06-09

## 2022-05-18 RX ORDER — METHYLPREDNISOLONE SODIUM SUCCINATE 125 MG/2ML
125 INJECTION, POWDER, LYOPHILIZED, FOR SOLUTION INTRAMUSCULAR; INTRAVENOUS
Status: CANCELLED
Start: 2022-06-09

## 2022-05-18 RX ORDER — NALOXONE HYDROCHLORIDE 0.4 MG/ML
0.2 INJECTION, SOLUTION INTRAMUSCULAR; INTRAVENOUS; SUBCUTANEOUS
Status: CANCELLED | OUTPATIENT
Start: 2022-06-09

## 2022-05-18 RX ORDER — ALBUTEROL SULFATE 0.83 MG/ML
2.5 SOLUTION RESPIRATORY (INHALATION) ONCE
Status: COMPLETED | OUTPATIENT
Start: 2022-05-18 | End: 2022-05-18

## 2022-05-18 RX ORDER — DIPHENHYDRAMINE HYDROCHLORIDE 50 MG/ML
50 INJECTION INTRAMUSCULAR; INTRAVENOUS
Status: CANCELLED
Start: 2022-06-09

## 2022-05-18 RX ORDER — EPINEPHRINE 1 MG/ML
0.3 INJECTION, SOLUTION, CONCENTRATE INTRAVENOUS EVERY 5 MIN PRN
Status: CANCELLED | OUTPATIENT
Start: 2022-06-09

## 2022-05-18 RX ORDER — ALBUTEROL SULFATE 0.83 MG/ML
2.5 SOLUTION RESPIRATORY (INHALATION) ONCE
Status: CANCELLED
Start: 2022-06-09 | End: 2022-06-09

## 2022-05-18 RX ORDER — MEPERIDINE HYDROCHLORIDE 25 MG/ML
25 INJECTION INTRAMUSCULAR; INTRAVENOUS; SUBCUTANEOUS EVERY 30 MIN PRN
Status: CANCELLED | OUTPATIENT
Start: 2022-06-09

## 2022-05-18 RX ORDER — PENTAMIDINE ISETHIONATE 300 MG/300MG
300 INHALANT RESPIRATORY (INHALATION) ONCE
Status: COMPLETED | OUTPATIENT
Start: 2022-05-18 | End: 2022-05-18

## 2022-05-18 RX ORDER — HEPARIN SODIUM (PORCINE) LOCK FLUSH IV SOLN 100 UNIT/ML 100 UNIT/ML
5 SOLUTION INTRAVENOUS ONCE
Status: COMPLETED | OUTPATIENT
Start: 2022-05-18 | End: 2022-05-18

## 2022-05-18 RX ADMIN — PENTAMIDINE ISETHIONATE 300 MG: 300 INHALANT RESPIRATORY (INHALATION) at 12:37

## 2022-05-18 RX ADMIN — ALBUTEROL SULFATE 2.5 MG: 0.83 SOLUTION RESPIRATORY (INHALATION) at 12:36

## 2022-05-18 RX ADMIN — Medication 5 ML: at 12:10

## 2022-05-18 NOTE — NURSING NOTE
"Chief Complaint   Patient presents with     Port Draw     Labs drawn via port by RN.     Labs drawn via port by RN. Port accessed with 20G 3/4\" gripper needle. Flushed with NS and heparin. Pt tolerated well. De-accessed.     Suri Alfaro RN    "

## 2022-05-18 NOTE — PROGRESS NOTES
Patient was seen today for a Pentamidine nebulizer tx ordered by Dr. Terry.    Patient was first given 2.5 mg of Albuterol vianebulizer, after which Pentamidine 300 mg (Lot # [id number]) mixed with 6cc Sterile H20 was administered through a filtered nebulizer.    Pre-treatment: SpO2 = 95%   HR = 66   BBS = Clear  Post-treatment: SpO2 = 98%  HR = 67  BBS = Clear    No adverse side effects noted by the patient.    This service today was provided under the direct supervision of Dr. Lopez, who was available if needed.     Procedure was completed by Samanta Cerda.[email or web address]

## 2022-06-15 ENCOUNTER — LAB (OUTPATIENT)
Dept: LAB | Facility: CLINIC | Age: 57
End: 2022-06-15
Payer: COMMERCIAL

## 2022-06-15 ENCOUNTER — LAB REQUISITION (OUTPATIENT)
Dept: LAB | Facility: CLINIC | Age: 57
End: 2022-06-15

## 2022-06-15 DIAGNOSIS — C82.08 FOLLICULAR LYMPHOMA GRADE I, LYMPH NODES OF MULTIPLE SITES (H): ICD-10-CM

## 2022-06-15 DIAGNOSIS — C83.398 DIFFUSE LARGE B-CELL LYMPHOMA OF EXTRANODAL SITE EXCLUDING SPLEEN AND OTHER SOLID ORGANS: Primary | ICD-10-CM

## 2022-06-15 LAB
BASOPHILS # BLD AUTO: 0 10E3/UL (ref 0–0.2)
BASOPHILS NFR BLD AUTO: 0 %
EOSINOPHIL # BLD AUTO: 0.1 10E3/UL (ref 0–0.7)
EOSINOPHIL NFR BLD AUTO: 2 %
ERYTHROCYTE [DISTWIDTH] IN BLOOD BY AUTOMATED COUNT: 14.4 % (ref 10–15)
HCT VFR BLD AUTO: 33.2 % (ref 40–53)
HGB BLD-MCNC: 11.3 G/DL (ref 13.3–17.7)
IMM GRANULOCYTES # BLD: 0 10E3/UL
IMM GRANULOCYTES NFR BLD: 1 %
LYMPHOCYTES # BLD AUTO: 0.3 10E3/UL (ref 0.8–5.3)
LYMPHOCYTES NFR BLD AUTO: 10 %
MCH RBC QN AUTO: 34.1 PG (ref 26.5–33)
MCHC RBC AUTO-ENTMCNC: 34 G/DL (ref 31.5–36.5)
MCV RBC AUTO: 100 FL (ref 78–100)
MONOCYTES # BLD AUTO: 0.4 10E3/UL (ref 0–1.3)
MONOCYTES NFR BLD AUTO: 16 %
NEUTROPHILS # BLD AUTO: 2 10E3/UL (ref 1.6–8.3)
NEUTROPHILS NFR BLD AUTO: 71 %
NRBC # BLD AUTO: 0 10E3/UL
NRBC BLD AUTO-RTO: 0 /100
PLATELET # BLD AUTO: 77 10E3/UL (ref 150–450)
RBC # BLD AUTO: 3.31 10E6/UL (ref 4.4–5.9)
WBC # BLD AUTO: 2.8 10E3/UL (ref 4–11)

## 2022-06-15 PROCEDURE — 85018 HEMOGLOBIN: CPT

## 2022-06-15 PROCEDURE — 94640 AIRWAY INHALATION TREATMENT: CPT | Performed by: PHYSICIAN ASSISTANT

## 2022-06-15 PROCEDURE — 36415 COLL VENOUS BLD VENIPUNCTURE: CPT

## 2022-06-15 PROCEDURE — 94642 AEROSOL INHALATION TREATMENT: CPT | Performed by: PHYSICIAN ASSISTANT

## 2022-06-15 RX ORDER — PENTAMIDINE ISETHIONATE 300 MG/300MG
300 INHALANT RESPIRATORY (INHALATION) ONCE
Status: CANCELLED
Start: 2022-07-13 | End: 2022-07-13

## 2022-06-15 RX ORDER — ALBUTEROL SULFATE 0.83 MG/ML
2.5 SOLUTION RESPIRATORY (INHALATION) ONCE
Status: COMPLETED | OUTPATIENT
Start: 2022-06-15 | End: 2022-06-15

## 2022-06-15 RX ORDER — METHYLPREDNISOLONE SODIUM SUCCINATE 125 MG/2ML
125 INJECTION, POWDER, LYOPHILIZED, FOR SOLUTION INTRAMUSCULAR; INTRAVENOUS
Status: CANCELLED
Start: 2022-07-13

## 2022-06-15 RX ORDER — NALOXONE HYDROCHLORIDE 0.4 MG/ML
0.2 INJECTION, SOLUTION INTRAMUSCULAR; INTRAVENOUS; SUBCUTANEOUS
Status: CANCELLED | OUTPATIENT
Start: 2022-07-13

## 2022-06-15 RX ORDER — ACETAMINOPHEN 325 MG/1
325 TABLET ORAL EVERY 4 HOURS PRN
Status: CANCELLED
Start: 2022-06-15

## 2022-06-15 RX ORDER — ALBUTEROL SULFATE 90 UG/1
1-2 AEROSOL, METERED RESPIRATORY (INHALATION)
Status: CANCELLED
Start: 2022-07-13

## 2022-06-15 RX ORDER — ALBUTEROL SULFATE 0.83 MG/ML
2.5 SOLUTION RESPIRATORY (INHALATION)
Status: CANCELLED | OUTPATIENT
Start: 2022-07-13

## 2022-06-15 RX ORDER — HEPARIN SODIUM,PORCINE 10 UNIT/ML
5 VIAL (ML) INTRAVENOUS
Status: CANCELLED | OUTPATIENT
Start: 2022-06-15

## 2022-06-15 RX ORDER — PENTAMIDINE ISETHIONATE 300 MG/300MG
300 INHALANT RESPIRATORY (INHALATION) ONCE
Status: COMPLETED | OUTPATIENT
Start: 2022-06-15 | End: 2022-06-15

## 2022-06-15 RX ORDER — HEPARIN SODIUM (PORCINE) LOCK FLUSH IV SOLN 100 UNIT/ML 100 UNIT/ML
5 SOLUTION INTRAVENOUS
Status: CANCELLED | OUTPATIENT
Start: 2022-06-15

## 2022-06-15 RX ORDER — MEPERIDINE HYDROCHLORIDE 25 MG/ML
25 INJECTION INTRAMUSCULAR; INTRAVENOUS; SUBCUTANEOUS EVERY 30 MIN PRN
Status: CANCELLED | OUTPATIENT
Start: 2022-07-13

## 2022-06-15 RX ORDER — DIPHENHYDRAMINE HYDROCHLORIDE 50 MG/ML
50 INJECTION INTRAMUSCULAR; INTRAVENOUS
Status: CANCELLED
Start: 2022-07-13

## 2022-06-15 RX ORDER — EPINEPHRINE 1 MG/ML
0.3 INJECTION, SOLUTION, CONCENTRATE INTRAVENOUS EVERY 5 MIN PRN
Status: CANCELLED | OUTPATIENT
Start: 2022-07-13

## 2022-06-15 RX ORDER — DIPHENHYDRAMINE HCL 25 MG
25 CAPSULE ORAL EVERY 6 HOURS PRN
Status: CANCELLED
Start: 2022-06-15

## 2022-06-15 RX ORDER — ALBUTEROL SULFATE 0.83 MG/ML
2.5 SOLUTION RESPIRATORY (INHALATION) ONCE
Status: CANCELLED
Start: 2022-07-13 | End: 2022-07-13

## 2022-06-15 RX ADMIN — ALBUTEROL SULFATE 2.5 MG: 0.83 SOLUTION RESPIRATORY (INHALATION) at 11:41

## 2022-06-15 RX ADMIN — PENTAMIDINE ISETHIONATE 300 MG: 300 INHALANT RESPIRATORY (INHALATION) at 11:43

## 2022-06-15 NOTE — NURSING NOTE
Chief Complaint   Patient presents with     Blood Draw     Labs drawn via VPT by RN in lab.      Labs collected from venipuncture by RN.     Caro DIMAS RN PHN BSN  BMT/Oncology Lab

## 2022-06-15 NOTE — PROGRESS NOTES
Juvenal Blake was seen today for a Pentamidine nebulizer tx ordered by Dr.Daniel Butt.    Patient was first given 2.5 mg of albuterol nebulizer, after which Pentamidine 300 mg inhalation solution mixed with 6cc Sterile H20 was administered through a filtered nebulizer.    No adverse side effects noted by the patient.    This service today was provided under the direct supervision of Dr. Sharon Myers, a physician on duty, who was available if needed.     Procedure was completed by Cody Nelson.

## 2022-06-24 ENCOUNTER — LAB REQUISITION (OUTPATIENT)
Dept: LAB | Facility: CLINIC | Age: 57
End: 2022-06-24

## 2022-06-24 ENCOUNTER — LAB REQUISITION (OUTPATIENT)
Dept: LAB | Facility: CLINIC | Age: 57
End: 2022-06-24
Payer: COMMERCIAL

## 2022-06-24 DIAGNOSIS — R09.89 OTHER SPECIFIED SYMPTOMS AND SIGNS INVOLVING THE CIRCULATORY AND RESPIRATORY SYSTEMS: ICD-10-CM

## 2022-06-24 DIAGNOSIS — R09.81 NASAL CONGESTION: ICD-10-CM

## 2022-06-24 LAB
ALBUMIN SERPL-MCNC: 4.1 G/DL (ref 3.5–5)
ALP SERPL-CCNC: 96 U/L (ref 45–120)
ALT SERPL W P-5'-P-CCNC: 48 U/L (ref 0–45)
ANION GAP SERPL CALCULATED.3IONS-SCNC: 11 MMOL/L (ref 5–18)
AST SERPL W P-5'-P-CCNC: 22 U/L (ref 0–40)
BILIRUB SERPL-MCNC: 0.6 MG/DL (ref 0–1)
BUN SERPL-MCNC: 21 MG/DL (ref 8–22)
CALCIUM SERPL-MCNC: 9.6 MG/DL (ref 8.5–10.5)
CHLORIDE BLD-SCNC: 106 MMOL/L (ref 98–107)
CO2 SERPL-SCNC: 24 MMOL/L (ref 22–31)
CREAT SERPL-MCNC: 1.26 MG/DL (ref 0.7–1.3)
GFR SERPL CREATININE-BSD FRML MDRD: 66 ML/MIN/1.73M2
GLUCOSE BLD-MCNC: 174 MG/DL (ref 70–125)
POTASSIUM BLD-SCNC: 4.1 MMOL/L (ref 3.5–5)
PROT SERPL-MCNC: 6.3 G/DL (ref 6–8)
SARS-COV-2 RNA RESP QL NAA+PROBE: NEGATIVE
SODIUM SERPL-SCNC: 141 MMOL/L (ref 136–145)

## 2022-06-24 PROCEDURE — 80053 COMPREHEN METABOLIC PANEL: CPT | Performed by: FAMILY MEDICINE

## 2022-06-24 PROCEDURE — 82040 ASSAY OF SERUM ALBUMIN: CPT | Performed by: FAMILY MEDICINE

## 2022-06-24 PROCEDURE — U0005 INFEC AGEN DETEC AMPLI PROBE: HCPCS | Mod: ORL | Performed by: FAMILY MEDICINE

## 2022-07-07 ENCOUNTER — LAB REQUISITION (OUTPATIENT)
Dept: LAB | Facility: CLINIC | Age: 57
End: 2022-07-07
Payer: COMMERCIAL

## 2022-07-07 DIAGNOSIS — R05.1 ACUTE COUGH: ICD-10-CM

## 2022-07-07 PROCEDURE — U0003 INFECTIOUS AGENT DETECTION BY NUCLEIC ACID (DNA OR RNA); SEVERE ACUTE RESPIRATORY SYNDROME CORONAVIRUS 2 (SARS-COV-2) (CORONAVIRUS DISEASE [COVID-19]), AMPLIFIED PROBE TECHNIQUE, MAKING USE OF HIGH THROUGHPUT TECHNOLOGIES AS DESCRIBED BY CMS-2020-01-R: HCPCS | Mod: ORL | Performed by: FAMILY MEDICINE

## 2022-07-08 LAB — SARS-COV-2 RNA RESP QL NAA+PROBE: NEGATIVE

## 2022-07-13 ENCOUNTER — LAB REQUISITION (OUTPATIENT)
Dept: LAB | Facility: CLINIC | Age: 57
End: 2022-07-13
Payer: COMMERCIAL

## 2022-07-13 DIAGNOSIS — Z01.812 ENCOUNTER FOR PREPROCEDURAL LABORATORY EXAMINATION: ICD-10-CM

## 2022-07-13 LAB — SARS-COV-2 RNA RESP QL NAA+PROBE: NEGATIVE

## 2022-07-13 PROCEDURE — U0003 INFECTIOUS AGENT DETECTION BY NUCLEIC ACID (DNA OR RNA); SEVERE ACUTE RESPIRATORY SYNDROME CORONAVIRUS 2 (SARS-COV-2) (CORONAVIRUS DISEASE [COVID-19]), AMPLIFIED PROBE TECHNIQUE, MAKING USE OF HIGH THROUGHPUT TECHNOLOGIES AS DESCRIBED BY CMS-2020-01-R: HCPCS | Mod: ORL | Performed by: FAMILY MEDICINE

## 2022-07-25 ENCOUNTER — TELEPHONE (OUTPATIENT)
Dept: TRANSPLANT | Facility: CLINIC | Age: 57
End: 2022-07-25

## 2022-07-25 NOTE — TELEPHONE ENCOUNTER
Received notification from  that Juvenal has tested positive for COVID via at home test. Pt reports symptom onset on 7/22/22 with headache, fever. Denies SOB, cough, congestion. Reports symptoms are not that bothersome. Provided monoclonal antibody treatment contact number. Juvenal was encouraged to call with worsening signs or symptoms. Will plan to delay BMT anniversary testing until week of 8/8 or later.

## 2022-07-26 ENCOUNTER — VIRTUAL VISIT (OUTPATIENT)
Dept: URGENT CARE | Facility: CLINIC | Age: 57
End: 2022-07-26
Payer: COMMERCIAL

## 2022-07-26 DIAGNOSIS — U07.1 COVID: Primary | ICD-10-CM

## 2022-07-26 PROCEDURE — 99203 OFFICE O/P NEW LOW 30 MIN: CPT | Mod: CS | Performed by: EMERGENCY MEDICINE

## 2022-07-26 NOTE — PROGRESS NOTES
"Video visit:  Start time: 1:31 PM  Stop time: 1:41 PM  Duration: 10 minutes  Patient location: Home  Provider location: [facility] virtual provider (remote)  Platform used for video appointment: Hollie      The patient has been notified of following:     \"This video visit will be conducted via a call between you and your physician/provider. We have found that certain health care needs can be provided without the need for a physical exam.  This service lets us provide the care you need with a short phone conversation.  If a prescription is necessary we can send it directly to your pharmacy.  If lab work is needed we can place an order for that and you can then stop by our lab to have the test done at a later time.    Video visits are billed at different rates depending on your insurance coverage. During this emergency period, for some insurers they may be billed the same as an in-person visit.  Please reach out to your insurance provider with any questions.    If during the course of the call the physician/provider feels a telephone visit is not appropriate, you will not be charged for this service.\"    Patient has given verbal consent for video visit?  Yes    What phone number would you like to be contacted at?  [phone]    How would you like to obtain your AVS? MyChart    Subjective   CC: Juvenal Blake  is a 56 year old male who presents via phone visit today for the following health issues:   Chief Complaint   Patient presents with     Infection        COVID-19 Symptom Review  How many days ago did these symptoms start? 5    Are any of the following symptoms significant for you?  New or worsening difficulty breathing? Yes    Please describe what kind of difficulty you are having breathing:Mild dyspnea (able to do ADLs without difficulty, mild shortness of breath with activities such as climbing one or two flights of stairs or walking briskly)    Worsening cough? Yes, it's a dry cough.     Fever or " chills? Yes, I felt feverish or had chills.    Headache: YES    Sore throat: YES    Chest pain: No    Diarrhea: No    Body aches? No    What treatments has patient tried?    Does patient live in a nursing home, group home, or shelter? No  Does patient have a way to get food/medications during quarantined? Yes, I have a friend or family member who can help me. and Yes                       Reviewed and updated as needed this visit by Provider                    Review of Systems         Objective    Gen: Patient is alert, oriented  Gen: No acute distress on video visit.  Nondyspneic appearing speaking in full sentences.              Assessment/Plan:  Patient is a 56-year-old male whose had COVID infection for 5 days.  Symptoms are typical but does include mild exacerbation of chronic dyspnea.     Risk factors include elevated BMI, lymphoma  GFR: 66  Patient does consent to taking paxlovid.      Todd Marsh MD

## 2022-08-05 ENCOUNTER — LAB (OUTPATIENT)
Dept: LAB | Facility: CLINIC | Age: 57
End: 2022-08-05
Payer: COMMERCIAL

## 2022-08-05 ENCOUNTER — ANCILLARY PROCEDURE (OUTPATIENT)
Dept: CT IMAGING | Facility: CLINIC | Age: 57
End: 2022-08-05
Attending: PHYSICIAN ASSISTANT
Payer: COMMERCIAL

## 2022-08-05 VITALS — BODY MASS INDEX: 35.9 KG/M2 | WEIGHT: 232.81 LBS

## 2022-08-05 DIAGNOSIS — C82.08 FOLLICULAR LYMPHOMA GRADE I, LYMPH NODES OF MULTIPLE SITES (H): ICD-10-CM

## 2022-08-05 DIAGNOSIS — C82.00 FOLLICULAR LYMPHOMA GRADE I, UNSPECIFIED BODY REGION (H): ICD-10-CM

## 2022-08-05 LAB
BASOPHILS # BLD AUTO: 0 10E3/UL (ref 0–0.2)
BASOPHILS NFR BLD AUTO: 0 %
EOSINOPHIL # BLD AUTO: 0 10E3/UL (ref 0–0.7)
EOSINOPHIL NFR BLD AUTO: 0 %
ERYTHROCYTE [DISTWIDTH] IN BLOOD BY AUTOMATED COUNT: 15.9 % (ref 10–15)
HCT VFR BLD AUTO: 28.6 % (ref 40–53)
HGB BLD-MCNC: 9.6 G/DL (ref 13.3–17.7)
IMM GRANULOCYTES # BLD: 0.1 10E3/UL
IMM GRANULOCYTES NFR BLD: 2 %
LYMPHOCYTES # BLD AUTO: 0.2 10E3/UL (ref 0.8–5.3)
LYMPHOCYTES NFR BLD AUTO: 5 %
MCH RBC QN AUTO: 33.8 PG (ref 26.5–33)
MCHC RBC AUTO-ENTMCNC: 33.6 G/DL (ref 31.5–36.5)
MCV RBC AUTO: 101 FL (ref 78–100)
MONOCYTES # BLD AUTO: 0.4 10E3/UL (ref 0–1.3)
MONOCYTES NFR BLD AUTO: 10 %
NEUTROPHILS # BLD AUTO: 3.5 10E3/UL (ref 1.6–8.3)
NEUTROPHILS NFR BLD AUTO: 83 %
NRBC # BLD AUTO: 0 10E3/UL
NRBC BLD AUTO-RTO: 0 /100
PLATELET # BLD AUTO: 55 10E3/UL (ref 150–450)
RBC # BLD AUTO: 2.84 10E6/UL (ref 4.4–5.9)
WBC # BLD AUTO: 4.2 10E3/UL (ref 4–11)

## 2022-08-05 PROCEDURE — 250N000011 HC RX IP 250 OP 636

## 2022-08-05 PROCEDURE — 71260 CT THORAX DX C+: CPT | Performed by: RADIOLOGY

## 2022-08-05 PROCEDURE — 36591 DRAW BLOOD OFF VENOUS DEVICE: CPT

## 2022-08-05 PROCEDURE — 74177 CT ABD & PELVIS W/CONTRAST: CPT | Performed by: RADIOLOGY

## 2022-08-05 PROCEDURE — 85025 COMPLETE CBC W/AUTO DIFF WBC: CPT

## 2022-08-05 RX ORDER — HEPARIN SODIUM (PORCINE) LOCK FLUSH IV SOLN 100 UNIT/ML 100 UNIT/ML
5 SOLUTION INTRAVENOUS
Status: DISCONTINUED | OUTPATIENT
Start: 2022-08-05 | End: 2022-08-11 | Stop reason: HOSPADM

## 2022-08-05 RX ADMIN — Medication 5 ML: at 08:11

## 2022-08-05 NOTE — NURSING NOTE
Chief Complaint   Patient presents with     Labs Only     Labs drawn by RN in Lab from Right Chest Port-a-Cath. Line flushed with Saline and Heparin.      Claudine Armendariz RN

## 2022-08-08 ENCOUNTER — LAB REQUISITION (OUTPATIENT)
Dept: LAB | Facility: CLINIC | Age: 57
End: 2022-08-08
Payer: COMMERCIAL

## 2022-08-08 DIAGNOSIS — Z03.818 ENCOUNTER FOR OBSERVATION FOR SUSPECTED EXPOSURE TO OTHER BIOLOGICAL AGENTS RULED OUT: ICD-10-CM

## 2022-08-08 PROCEDURE — U0005 INFEC AGEN DETEC AMPLI PROBE: HCPCS | Mod: ORL | Performed by: NURSE PRACTITIONER

## 2022-08-08 PROCEDURE — 87081 CULTURE SCREEN ONLY: CPT | Mod: ORL | Performed by: NURSE PRACTITIONER

## 2022-08-09 LAB — SARS-COV-2 RNA RESP QL NAA+PROBE: POSITIVE

## 2022-08-10 ENCOUNTER — VIRTUAL VISIT (OUTPATIENT)
Dept: TRANSPLANT | Facility: CLINIC | Age: 57
End: 2022-08-10
Payer: COMMERCIAL

## 2022-08-10 DIAGNOSIS — U07.1 INFECTION DUE TO 2019 NOVEL CORONAVIRUS: ICD-10-CM

## 2022-08-10 DIAGNOSIS — D63.8 ANEMIA IN OTHER CHRONIC DISEASES CLASSIFIED ELSEWHERE: ICD-10-CM

## 2022-08-10 DIAGNOSIS — C82.08 FOLLICULAR LYMPHOMA GRADE I, LYMPH NODES OF MULTIPLE SITES (H): Primary | ICD-10-CM

## 2022-08-10 LAB — BACTERIA SPEC CULT: NORMAL

## 2022-08-10 PROCEDURE — G0463 HOSPITAL OUTPT CLINIC VISIT: HCPCS | Mod: PN,RTG

## 2022-08-10 PROCEDURE — 99213 OFFICE O/P EST LOW 20 MIN: CPT | Mod: GT

## 2022-08-10 NOTE — LETTER
8/10/2022         RE: Juvenal Blake  [address] [place] [address]        Dear Colleague,    Thank you for referring your patient, Juvenal Blake, to the [facility] [place]. Please see a copy of my visit note below.    Juvenal is a 56 year old who is being evaluated via a billable video visit.      Patient stated he is in the state of [place] for the visit today.    How would you like to obtain your AVS? MyChart  If the video visit is dropped, the invitation should be resent by: Send to e-mail at: [email or web address]  Will anyone else be joining your video visit? Yes, wife        Video-Visit Details    Video Start Time:12  Type of service:  Video Visit    Video End Time:12:20    Originating Location (pt. Location): Home    Distant Location (provider location):  [facility] [place]     Platform used for Video Visit: Aysha Menon, Virtual Visit Facilitator      BMT/Cell Therapy Daily Progress Note      Patient ID:  Juvenal Blake is a 56 year old male, currently day +209 s/p Yescarta CAR-T.      Diagnosis NHLFL Non-Hodgkin lymphoma, Follicular, predominantly large cell  BMTCT Type Cellular Therapy    Prep Regimen Fludarabine, Cytoxan  Donor Source Autologous     GVHD Prophylaxis NA  Primary BMT MD Dr. Terry     Clinical Trials   2017-45            INTERVAL  HISTORY     Juvenal presents via video with his wife.  He is feeling better, had COVID but is now recovering.   No new lumps or issues. Had minimal COVID sy and EVUSHELD.     He is on disability, not working now, appetite are both good.  no fevers/chills, lumps/bumps, night sweats, unintentional weight loss, shortness of breath, or chest pain.     Review of Systems: 8 point ROS negative except as noted above.        PHYSICAL EXAM      KPS:  90  There were no vitals taken for this visit.     Wt Readings from Last 4 Encounters:   08/05/22 105.6 kg (232  lb 12.9 oz)   04/18/22 111.1 kg (245 lb)   03/17/22 109.4 kg (241 lb 1.6 oz)   02/17/22 107.5 kg (237 lb 1.6 oz)     General: NAD, good colour, ECOG 1, no edema.strong voice          LABS AND IMAGING: I have assessed all abnormal lab values for their clinical significance and any values considered clinically significant have been addressed in the assessment and plan.       Lab Results   Component Value Date    WBC 4.2 08/05/2022    ANEU 3.1 04/18/2022    HGB 9.6 (L) 08/05/2022    HCT 28.6 (L) 08/05/2022    PLT 55 (L) 08/05/2022     06/24/2022    POTASSIUM 4.1 06/24/2022    CHLORIDE 106 06/24/2022    CO2 24 06/24/2022     (H) 06/24/2022    BUN 21 06/24/2022    CR 1.26 06/24/2022    MAG 1.9 04/18/2022    INR 0.96 02/17/2022    AST 22 06/24/2022    ALT 48 (H) 06/24/2022       CT 8/5/2022                                                                  IMPRESSION:   1. Increased size but nonenlarged left level 2A lymph node, likely  reactive, especially considering new paranasal sinus mucosal  thickening since 4/15/2022.  2. New 10 mm part solid pulmonary nodule in the left lung apex since  4/15/2022. Consider short-term follow-up with dedicated chest CT in  3-6 months.                                                                       IMPRESSION:  1.  No evidence of recurrent lymphoma.  2.  Stable hazy soft tissue densities in the mesentery and retroperitoneum which are likely due to treated disease. No new lymphadenopathy. Stable mild splenomegaly.  3.  New 10 mm groundglass opacity in the left upper lobe may be inflammatory. This can be assessed on routine oncology follow-up CT.  4.  Hepatic steatosis.  5.  Mild diffuse bladder wall thickening and mural enhancement unchanged and could be due to cystitis.          Assessment:  Juvenal Balke is a 56 year old male PMH significant for refractory NHL, Day +209 of Yescarta CAR-T therapy.     1. Follicular Lymphoma/Yescarta CAR-T:    Relapsed after BR,  OCHOP, NAM NK (9/1/20), idelalisib (~4-6/2020),  (6/28/21), polatuzumab (12/21/21). (hx Rituxan reaction).      Now s/p YESCARTa  In CR at day 201.     Very mild adenopathy is attributed to COVID.   Next restaging in 3 months.     R LDH is mildly elevated at 259.  Absolute CD4 is 99.   PET for D+100 - CR.       2. HEME: low Hgb , 9.6g/dl worse with COVID, monitor     3. ID:  Active COVID, PCR cont to be pos now 4 week after dg. Montor. Pt has no symptoms now.     #Prophy: acyclovir 400mg BID.  Inhaled pentamadine monthly advised.  #S/p 3 covid shots; first two were pre- and booster was after.  No need for vaccination  post CART given he did not have auto-trasplant .   Evusheld 300mcg given 3/17/2022 - next due in Dec 2022 (now had COVID so ok to delay)     4. GI:    - omeprazole for acid reflux  - hx medical cannibis     5. Renal/FEN: Creat, lytes wnl.  Avoid NSAIDs.    6. CV:    History of stable angina and NSTEMI type II.  Evaluated by cardiology prior to transplant, CTA coronary artery and echo completed.        7. :   #hx Prostatic adenocarcinoma: s/p radical prostatectomy and bilateral lymph node dissection 11/2017. Completed radiation to the prostate fossa and pelvic lymph nodes at [facility] early May 2018. He received 4500 cGy in 25 fractions. He then had 2340 cGy boost in 13 fractions to the prostatic fossa, for a total dose of 6240 cGy in 38 treatment fractions delivered over 54 days. He did not receive hormonal therapy:    - PSA 1.21 on 1/25/22, 0.71 on 9/10/21     8. Muscle/tendon aches:  Resolved off levaquin.      Plan:  - RTC at 9 months follow-up.   -monthly pentamidine inhaled - schedule   -labs in 1 month        Again, thank you for allowing me to participate in the care of your patient.      Sincerely,    Rosa Terry MD

## 2022-08-10 NOTE — PROGRESS NOTES
Juvenal is a 56 year old who is being evaluated via a billable video visit.      Patient stated he is in the state of [place] for the visit today.    How would you like to obtain your AVS? MyChart  If the video visit is dropped, the invitation should be resent by: Send to e-mail at: [email or web address]  Will anyone else be joining your video visit? Yes, wife        Video-Visit Details    Video Start Time:12  Type of service:  Video Visit    Video End Time:12:20    Originating Location (pt. Location): Home    Distant Location (provider location):  [facility] [place]     Platform used for Video Visit: Aysha Menon, Virtual Visit Facilitator      BMT/Cell Therapy Daily Progress Note      Patient ID:  Juvenal Blake is a 56 year old male, currently day +209 s/p Yescarta CAR-T.      Diagnosis NHLFL Non-Hodgkin lymphoma, Follicular, predominantly large cell  BMTCT Type Cellular Therapy    Prep Regimen Fludarabine, Cytoxan  Donor Source Autologous     GVHD Prophylaxis NA  Primary BMT MD Dr. Terry     Clinical Trials   2017-45            INTERVAL  HISTORY     Juvenal presents via video with his wife.  He is feeling better, had COVID but is now recovering.   No new lumps or issues. Had minimal COVID sy and EVUSHELD.     He is on disability, not working now, appetite are both good.  no fevers/chills, lumps/bumps, night sweats, unintentional weight loss, shortness of breath, or chest pain.     Review of Systems: 8 point ROS negative except as noted above.        PHYSICAL EXAM      KPS:  90  There were no vitals taken for this visit.     Wt Readings from Last 4 Encounters:   08/05/22 105.6 kg (232 lb 12.9 oz)   04/18/22 111.1 kg (245 lb)   03/17/22 109.4 kg (241 lb 1.6 oz)   02/17/22 107.5 kg (237 lb 1.6 oz)     General: NAD, good colour, ECOG 1, no edema.strong voice          LABS AND IMAGING: I have assessed all abnormal lab values for their clinical significance and any  values considered clinically significant have been addressed in the assessment and plan.       Lab Results   Component Value Date    WBC 4.2 08/05/2022    ANEU 3.1 04/18/2022    HGB 9.6 (L) 08/05/2022    HCT 28.6 (L) 08/05/2022    PLT 55 (L) 08/05/2022     06/24/2022    POTASSIUM 4.1 06/24/2022    CHLORIDE 106 06/24/2022    CO2 24 06/24/2022     (H) 06/24/2022    BUN 21 06/24/2022    CR 1.26 06/24/2022    MAG 1.9 04/18/2022    INR 0.96 02/17/2022    AST 22 06/24/2022    ALT 48 (H) 06/24/2022       CT 8/5/2022                                                                  IMPRESSION:   1. Increased size but nonenlarged left level 2A lymph node, likely  reactive, especially considering new paranasal sinus mucosal  thickening since 4/15/2022.  2. New 10 mm part solid pulmonary nodule in the left lung apex since  4/15/2022. Consider short-term follow-up with dedicated chest CT in  3-6 months.                                                                       IMPRESSION:  1.  No evidence of recurrent lymphoma.  2.  Stable hazy soft tissue densities in the mesentery and retroperitoneum which are likely due to treated disease. No new lymphadenopathy. Stable mild splenomegaly.  3.  New 10 mm groundglass opacity in the left upper lobe may be inflammatory. This can be assessed on routine oncology follow-up CT.  4.  Hepatic steatosis.  5.  Mild diffuse bladder wall thickening and mural enhancement unchanged and could be due to cystitis.          Assessment:  Juvenal Blake is a 56 year old male PMH significant for refractory NHL, Day +209 of Yescarta CAR-T therapy.     1. Follicular Lymphoma/Yescarta CAR-T:    Relapsed after BR, OCHOP, NAM NK (9/1/20), idelalisib (~4-6/2020),  (6/28/21), polatuzumab (12/21/21). (hx Rituxan reaction).      Now s/p YESCARTa  In CR at day 201.     Very mild adenopathy is attributed to COVID.   Next restaging in 3 months.     R LDH is mildly elevated at 259.  Absolute CD4  is 99.   PET for D+100 - CR.       2. HEME: low Hgb , 9.6g/dl worse with COVID, monitor     3. ID:  Active COVID, PCR cont to be pos now 4 week after dg. Maurizioor. Pt has no symptoms now.     #Prophy: acyclovir 400mg BID.  Inhaled pentamadine monthly advised.  #S/p 3 covid shots; first two were pre- and booster was after.  No need for vaccination  post CART given he did not have auto-trasplant .   Evusheld 300mcg given 3/17/2022 - next due in Dec 2022 (now had COVID so ok to delay)     4. GI:    - omeprazole for acid reflux  - hx medical cannibis     5. Renal/FEN: Creat, lytes wnl.  Avoid NSAIDs.    6. CV:    History of stable angina and NSTEMI type II.  Evaluated by cardiology prior to transplant, CTA coronary artery and echo completed.        7. :   #hx Prostatic adenocarcinoma: s/p radical prostatectomy and bilateral lymph node dissection 11/2017. Completed radiation to the prostate fossa and pelvic lymph nodes at [facility] early May 2018. He received 4500 cGy in 25 fractions. He then had 2340 cGy boost in 13 fractions to the prostatic fossa, for a total dose of 6240 cGy in 38 treatment fractions delivered over 54 days. He did not receive hormonal therapy:    - PSA 1.21 on 1/25/22, 0.71 on 9/10/21     8. Muscle/tendon aches:  Resolved off levaquin.      Plan:  - RTC at 9 months follow-up.   -monthly pentamidine inhaled - schedule   -labs in 1 month    Rosa Terry MD  Professor of Medicine  [phone]

## 2022-08-10 NOTE — NURSING NOTE
Patient verified medications and allergies are correct via eCheck-in. Patient confirms no changes at this time and/or since last reviewed by clinic staff.    Kerri Menon, Virtual Facilitator

## 2022-08-30 DIAGNOSIS — C82.08 FOLLICULAR LYMPHOMA GRADE I, LYMPH NODES OF MULTIPLE SITES (H): ICD-10-CM

## 2022-08-30 RX ORDER — ACYCLOVIR 800 MG/1
800 TABLET ORAL EVERY 12 HOURS
Qty: 180 TABLET | Refills: 1 | Status: SHIPPED | OUTPATIENT
Start: 2022-08-30 | End: 2023-08-02

## 2022-09-01 ENCOUNTER — LAB REQUISITION (OUTPATIENT)
Dept: LAB | Facility: CLINIC | Age: 57
End: 2022-09-01
Payer: COMMERCIAL

## 2022-09-01 DIAGNOSIS — J40 BRONCHITIS, NOT SPECIFIED AS ACUTE OR CHRONIC: ICD-10-CM

## 2022-09-01 PROCEDURE — U0003 INFECTIOUS AGENT DETECTION BY NUCLEIC ACID (DNA OR RNA); SEVERE ACUTE RESPIRATORY SYNDROME CORONAVIRUS 2 (SARS-COV-2) (CORONAVIRUS DISEASE [COVID-19]), AMPLIFIED PROBE TECHNIQUE, MAKING USE OF HIGH THROUGHPUT TECHNOLOGIES AS DESCRIBED BY CMS-2020-01-R: HCPCS | Mod: ORL | Performed by: FAMILY MEDICINE

## 2022-09-02 LAB — SARS-COV-2 RNA RESP QL NAA+PROBE: NEGATIVE

## 2022-09-12 DIAGNOSIS — C82.08 FOLLICULAR LYMPHOMA GRADE I, LYMPH NODES OF MULTIPLE SITES (H): ICD-10-CM

## 2022-09-12 DIAGNOSIS — C82.00 FOLLICULAR LYMPHOMA GRADE I, UNSPECIFIED BODY REGION (H): Primary | ICD-10-CM

## 2022-09-14 ENCOUNTER — LAB (OUTPATIENT)
Dept: LAB | Facility: CLINIC | Age: 57
End: 2022-09-14
Payer: COMMERCIAL

## 2022-09-14 DIAGNOSIS — C82.08 FOLLICULAR LYMPHOMA GRADE I, LYMPH NODES OF MULTIPLE SITES (H): ICD-10-CM

## 2022-09-14 DIAGNOSIS — C82.00 FOLLICULAR LYMPHOMA GRADE I, UNSPECIFIED BODY REGION (H): ICD-10-CM

## 2022-09-14 DIAGNOSIS — C82.00 FOLLICULAR LYMPHOMA GRADE I, UNSPECIFIED BODY REGION (H): Primary | ICD-10-CM

## 2022-09-14 DIAGNOSIS — C82.08 FOLLICULAR LYMPHOMA GRADE I, LYMPH NODES OF MULTIPLE SITES (H): Primary | ICD-10-CM

## 2022-09-14 LAB
ALBUMIN SERPL BCG-MCNC: 4.3 G/DL (ref 3.5–5.2)
ALP SERPL-CCNC: 67 U/L (ref 40–129)
ALT SERPL W P-5'-P-CCNC: 31 U/L (ref 10–50)
ANION GAP SERPL CALCULATED.3IONS-SCNC: 10 MMOL/L (ref 7–15)
AST SERPL W P-5'-P-CCNC: 25 U/L (ref 10–50)
BASOPHILS # BLD AUTO: 0 10E3/UL (ref 0–0.2)
BASOPHILS NFR BLD AUTO: 0 %
BILIRUB SERPL-MCNC: 0.7 MG/DL
BUN SERPL-MCNC: 14.9 MG/DL (ref 6–20)
CALCIUM SERPL-MCNC: 9.5 MG/DL (ref 8.6–10)
CD19 CELLS # BLD: 48 CELLS/UL (ref 107–698)
CD19 CELLS NFR BLD: 13 % (ref 6–27)
CD3 CELLS # BLD: 137 CELLS/UL (ref 603–2990)
CD3 CELLS NFR BLD: 38 % (ref 49–84)
CD3+CD4+ CELLS # BLD: 94 CELLS/UL (ref 441–2156)
CD3+CD4+ CELLS NFR BLD: 26 % (ref 28–63)
CD3+CD4+ CELLS/CD3+CD8+ CLL BLD: 2.19 % (ref 1.4–2.6)
CD3+CD8+ CELLS # BLD: 43 CELLS/UL (ref 125–1312)
CD3+CD8+ CELLS NFR BLD: 12 % (ref 10–40)
CD3-CD16+CD56+ CELLS # BLD: 169 CELLS/UL (ref 95–640)
CD3-CD16+CD56+ CELLS NFR BLD: 47 % (ref 4–25)
CHLORIDE SERPL-SCNC: 102 MMOL/L (ref 98–107)
CREAT SERPL-MCNC: 1.07 MG/DL (ref 0.67–1.17)
DEPRECATED HCO3 PLAS-SCNC: 27 MMOL/L (ref 22–29)
EOSINOPHIL # BLD AUTO: 0 10E3/UL (ref 0–0.7)
EOSINOPHIL NFR BLD AUTO: 1 %
ERYTHROCYTE [DISTWIDTH] IN BLOOD BY AUTOMATED COUNT: 15.7 % (ref 10–15)
GFR SERPL CREATININE-BSD FRML MDRD: 80 ML/MIN/1.73M2
GLUCOSE SERPL-MCNC: 132 MG/DL (ref 70–99)
HCT VFR BLD AUTO: 34.3 % (ref 40–53)
HGB BLD-MCNC: 11.3 G/DL (ref 13.3–17.7)
IMM GRANULOCYTES # BLD: 0 10E3/UL
IMM GRANULOCYTES NFR BLD: 0 %
LDH SERPL L TO P-CCNC: 199 U/L (ref 0–250)
LYMPHOCYTES # BLD AUTO: 0.3 10E3/UL (ref 0.8–5.3)
LYMPHOCYTES NFR BLD AUTO: 8 %
MAGNESIUM SERPL-MCNC: 2.1 MG/DL (ref 1.7–2.3)
MCH RBC QN AUTO: 34 PG (ref 26.5–33)
MCHC RBC AUTO-ENTMCNC: 32.9 G/DL (ref 31.5–36.5)
MCV RBC AUTO: 103 FL (ref 78–100)
MONOCYTES # BLD AUTO: 0.4 10E3/UL (ref 0–1.3)
MONOCYTES NFR BLD AUTO: 9 %
NEUTROPHILS # BLD AUTO: 3.7 10E3/UL (ref 1.6–8.3)
NEUTROPHILS NFR BLD AUTO: 82 %
NRBC # BLD AUTO: 0 10E3/UL
NRBC BLD AUTO-RTO: 0 /100
PHOSPHATE SERPL-MCNC: 2.9 MG/DL (ref 2.5–4.5)
PLATELET # BLD AUTO: 72 10E3/UL (ref 150–450)
POTASSIUM SERPL-SCNC: 4 MMOL/L (ref 3.4–5.3)
PROT SERPL-MCNC: 6.2 G/DL (ref 6.4–8.3)
RBC # BLD AUTO: 3.32 10E6/UL (ref 4.4–5.9)
SODIUM SERPL-SCNC: 139 MMOL/L (ref 136–145)
T CELL EXTENDED COMMENT: ABNORMAL
WBC # BLD AUTO: 4.5 10E3/UL (ref 4–11)

## 2022-09-14 PROCEDURE — 82784 ASSAY IGA/IGD/IGG/IGM EACH: CPT | Performed by: PHYSICIAN ASSISTANT

## 2022-09-14 PROCEDURE — 85025 COMPLETE CBC W/AUTO DIFF WBC: CPT

## 2022-09-14 PROCEDURE — 36591 DRAW BLOOD OFF VENOUS DEVICE: CPT

## 2022-09-14 PROCEDURE — 86355 B CELLS TOTAL COUNT: CPT | Performed by: PHYSICIAN ASSISTANT

## 2022-09-14 PROCEDURE — 84100 ASSAY OF PHOSPHORUS: CPT | Performed by: PHYSICIAN ASSISTANT

## 2022-09-14 PROCEDURE — 83735 ASSAY OF MAGNESIUM: CPT | Performed by: PHYSICIAN ASSISTANT

## 2022-09-14 PROCEDURE — 83615 LACTATE (LD) (LDH) ENZYME: CPT

## 2022-09-14 PROCEDURE — 80053 COMPREHEN METABOLIC PANEL: CPT | Performed by: PHYSICIAN ASSISTANT

## 2022-09-14 PROCEDURE — 250N000011 HC RX IP 250 OP 636

## 2022-09-14 RX ORDER — HEPARIN SODIUM (PORCINE) LOCK FLUSH IV SOLN 100 UNIT/ML 100 UNIT/ML
5 SOLUTION INTRAVENOUS
Status: CANCELLED | OUTPATIENT
Start: 2022-09-14

## 2022-09-14 RX ORDER — HEPARIN SODIUM,PORCINE 10 UNIT/ML
5 VIAL (ML) INTRAVENOUS
Status: CANCELLED | OUTPATIENT
Start: 2022-09-14

## 2022-09-14 RX ORDER — HEPARIN SODIUM (PORCINE) LOCK FLUSH IV SOLN 100 UNIT/ML 100 UNIT/ML
5 SOLUTION INTRAVENOUS
Status: DISCONTINUED | OUTPATIENT
Start: 2022-09-14 | End: 2022-09-20 | Stop reason: HOSPADM

## 2022-09-14 RX ADMIN — Medication 5 ML: at 11:30

## 2022-09-14 NOTE — NURSING NOTE
"Chief Complaint   Patient presents with     Port Draw     Lab only appointment.  Port accessed and labs drawn by rn in lab.      Port accessed with 20g 3/4\" gripper needle, labs drawn by RN in lab.  Port flushed with saline and heparin.  Port de-accessed.    Rebecca Jessica RN      "

## 2022-09-15 LAB — IGG SERPL-MCNC: 237 MG/DL (ref 610–1616)

## 2022-09-24 ENCOUNTER — HEALTH MAINTENANCE LETTER (OUTPATIENT)
Age: 57
End: 2022-09-24

## 2022-09-30 ENCOUNTER — LAB REQUISITION (OUTPATIENT)
Dept: LAB | Facility: CLINIC | Age: 57
End: 2022-09-30
Payer: COMMERCIAL

## 2022-09-30 DIAGNOSIS — Z01.818 ENCOUNTER FOR OTHER PREPROCEDURAL EXAMINATION: ICD-10-CM

## 2022-09-30 PROCEDURE — U0003 INFECTIOUS AGENT DETECTION BY NUCLEIC ACID (DNA OR RNA); SEVERE ACUTE RESPIRATORY SYNDROME CORONAVIRUS 2 (SARS-COV-2) (CORONAVIRUS DISEASE [COVID-19]), AMPLIFIED PROBE TECHNIQUE, MAKING USE OF HIGH THROUGHPUT TECHNOLOGIES AS DESCRIBED BY CMS-2020-01-R: HCPCS | Mod: ORL | Performed by: PHYSICIAN ASSISTANT

## 2022-10-01 LAB — SARS-COV-2 RNA RESP QL NAA+PROBE: NEGATIVE

## 2022-10-14 NOTE — PROGRESS NOTES
Patient arrived ambulatory, by self, for port lab draw and treatment.  Port accessed under aseptic technique - no blood return noted until after 8 - 10cc syringes of NS used for flushing, then got an excellent blood return but not enough to draw labs.  Peripheral lab draw accomplished.  Lab results reviewed.  IV of NS initiated at Gripper needle in port for treatment.  Given premeds PO/IVP as ordered.  Given Rituxan IVPB at variable rate of 100cc/hr for 30 minutes and 200cc/hr for 1 hour.  Port flushed with NS.  Heparin instilled and needle dced.  Dressing applied.  Patient states he has next appt scheduled.  Instructed to call with questions/concerns/problems.  Patient verbalized understanding.  
none

## 2022-10-19 ENCOUNTER — ANCILLARY PROCEDURE (OUTPATIENT)
Dept: CT IMAGING | Facility: CLINIC | Age: 57
End: 2022-10-19
Payer: COMMERCIAL

## 2022-10-19 ENCOUNTER — LAB (OUTPATIENT)
Dept: LAB | Facility: CLINIC | Age: 57
End: 2022-10-19
Payer: COMMERCIAL

## 2022-10-19 VITALS
OXYGEN SATURATION: 96 % | TEMPERATURE: 98.1 F | BODY MASS INDEX: 35.58 KG/M2 | WEIGHT: 230.7 LBS | HEART RATE: 66 BPM | SYSTOLIC BLOOD PRESSURE: 142 MMHG | DIASTOLIC BLOOD PRESSURE: 83 MMHG | RESPIRATION RATE: 16 BRPM

## 2022-10-19 DIAGNOSIS — C82.08 FOLLICULAR LYMPHOMA GRADE I, LYMPH NODES OF MULTIPLE SITES (H): ICD-10-CM

## 2022-10-19 DIAGNOSIS — C82.00 FOLLICULAR LYMPHOMA GRADE I, UNSPECIFIED BODY REGION (H): ICD-10-CM

## 2022-10-19 DIAGNOSIS — C82.08 FOLLICULAR LYMPHOMA GRADE I, LYMPH NODES OF MULTIPLE SITES (H): Primary | ICD-10-CM

## 2022-10-19 LAB
BASOPHILS # BLD AUTO: 0 10E3/UL (ref 0–0.2)
BASOPHILS NFR BLD AUTO: 1 %
EOSINOPHIL # BLD AUTO: 0.1 10E3/UL (ref 0–0.7)
EOSINOPHIL NFR BLD AUTO: 2 %
ERYTHROCYTE [DISTWIDTH] IN BLOOD BY AUTOMATED COUNT: 13.9 % (ref 10–15)
HCT VFR BLD AUTO: 34.5 % (ref 40–53)
HGB BLD-MCNC: 11.6 G/DL (ref 13.3–17.7)
IMM GRANULOCYTES # BLD: 0.1 10E3/UL
IMM GRANULOCYTES NFR BLD: 1 %
LYMPHOCYTES # BLD AUTO: 0.5 10E3/UL (ref 0.8–5.3)
LYMPHOCYTES NFR BLD AUTO: 9 %
MCH RBC QN AUTO: 33 PG (ref 26.5–33)
MCHC RBC AUTO-ENTMCNC: 33.6 G/DL (ref 31.5–36.5)
MCV RBC AUTO: 98 FL (ref 78–100)
MONOCYTES # BLD AUTO: 0.5 10E3/UL (ref 0–1.3)
MONOCYTES NFR BLD AUTO: 8 %
NEUTROPHILS # BLD AUTO: 4.3 10E3/UL (ref 1.6–8.3)
NEUTROPHILS NFR BLD AUTO: 79 %
NRBC # BLD AUTO: 0 10E3/UL
NRBC BLD AUTO-RTO: 0 /100
PLATELET # BLD AUTO: 113 10E3/UL (ref 150–450)
RBC # BLD AUTO: 3.51 10E6/UL (ref 4.4–5.9)
WBC # BLD AUTO: 5.4 10E3/UL (ref 4–11)

## 2022-10-19 PROCEDURE — 74177 CT ABD & PELVIS W/CONTRAST: CPT | Performed by: RADIOLOGY

## 2022-10-19 PROCEDURE — 250N000011 HC RX IP 250 OP 636

## 2022-10-19 PROCEDURE — 70491 CT SOFT TISSUE NECK W/DYE: CPT | Mod: GC | Performed by: RADIOLOGY

## 2022-10-19 PROCEDURE — 36591 DRAW BLOOD OFF VENOUS DEVICE: CPT

## 2022-10-19 PROCEDURE — 85014 HEMATOCRIT: CPT

## 2022-10-19 PROCEDURE — 71260 CT THORAX DX C+: CPT | Performed by: RADIOLOGY

## 2022-10-19 PROCEDURE — 82784 ASSAY IGA/IGD/IGG/IGM EACH: CPT

## 2022-10-19 RX ORDER — IOPAMIDOL 755 MG/ML
122 INJECTION, SOLUTION INTRAVASCULAR ONCE
Status: COMPLETED | OUTPATIENT
Start: 2022-10-19 | End: 2022-10-19

## 2022-10-19 RX ORDER — HEPARIN SODIUM (PORCINE) LOCK FLUSH IV SOLN 100 UNIT/ML 100 UNIT/ML
5 SOLUTION INTRAVENOUS ONCE
Status: COMPLETED | OUTPATIENT
Start: 2022-10-19 | End: 2022-10-19

## 2022-10-19 RX ADMIN — IOPAMIDOL 122 ML: 755 INJECTION, SOLUTION INTRAVASCULAR at 09:16

## 2022-10-19 RX ADMIN — Medication 5 ML: at 10:37

## 2022-10-19 ASSESSMENT — PAIN SCALES - GENERAL: PAINLEVEL: MODERATE PAIN (4)

## 2022-10-19 NOTE — NURSING NOTE
Chief Complaint   Patient presents with     Blood Draw     Labs drawn via PIV by RN. Vitals taken.     Labs drawn via pre-existing PIV by RN. Pt tolerated well. PIV removed. Port flushed with saline and heparin. De-accessed. Vitals taken.     Suri Alfaro RN

## 2022-10-20 ENCOUNTER — VIRTUAL VISIT (OUTPATIENT)
Dept: TRANSPLANT | Facility: CLINIC | Age: 57
End: 2022-10-20
Payer: COMMERCIAL

## 2022-10-20 DIAGNOSIS — C82.08 FOLLICULAR LYMPHOMA GRADE I, LYMPH NODES OF MULTIPLE SITES (H): Primary | ICD-10-CM

## 2022-10-20 LAB — IGG SERPL-MCNC: 222 MG/DL (ref 610–1616)

## 2022-10-20 PROCEDURE — 99214 OFFICE O/P EST MOD 30 MIN: CPT | Mod: GT

## 2022-10-20 RX ORDER — FLUTICASONE PROPIONATE 50 MCG
SPRAY, SUSPENSION (ML) NASAL
COMMUNITY
Start: 2022-08-22

## 2022-10-20 RX ORDER — BECLOMETHASONE DIPROPIONATE HFA 80 UG/1
AEROSOL, METERED RESPIRATORY (INHALATION)
COMMUNITY
Start: 2022-06-28

## 2022-10-20 RX ORDER — ALBUTEROL SULFATE 90 UG/1
2 AEROSOL, METERED RESPIRATORY (INHALATION) EVERY 6 HOURS
COMMUNITY

## 2022-10-20 RX ORDER — BENZONATATE 100 MG/1
CAPSULE ORAL
COMMUNITY
Start: 2021-12-23

## 2022-10-20 RX ORDER — LORATADINE 10 MG/1
TABLET ORAL
COMMUNITY
Start: 2022-09-06

## 2022-10-20 NOTE — LETTER
10/20/2022         RE: Juvenal Blake  [address] [place] [address]        Dear Colleague,    Thank you for referring your patient, Juvenal Blake, to the [facility] [place]. Please see a copy of my visit note below.    CELL THERAPY ANNIVERSARY VISIT.    Juvenal is 9 months after Yescarta infusion for his multiply relapsed follicular B-cell lymphoma.  He has been in CR and hd restaging now.     INTERIM HISTORY:  Fighting the cold, on antibiotics now also from recent cat bite.  Arm is still sore but healing ok.  The stomach is sore especially when he drives or exercises, he is not working at this time , on disability and interested to stay on it for some time.  No weight loss, no N/V, moving around well.     On exam: AO, in good spirits. Not ill apearring    Lab Results   Component Value Date    WBC 5.4 10/19/2022    ANEU 3.1 04/18/2022    HGB 11.6 (L) 10/19/2022    HCT 34.5 (L) 10/19/2022     (L) 10/19/2022     09/14/2022    POTASSIUM 4.0 09/14/2022    CHLORIDE 102 09/14/2022    CO2 27 09/14/2022     (H) 09/14/2022    BUN 14.9 09/14/2022    CR 1.07 09/14/2022    MAG 2.1 09/14/2022    INR 0.96 02/17/2022    AST 25 09/14/2022    ALT 31 09/14/2022         CT CAP 10/19/2022                                                                   IMPRESSION: In this patient with history of follicular lymphoma, there  is;  1. The spleen is mildly enlarged measuring 13.7 cm in cranial caudal  dimension, slightly decreased in size as compared to 8/5/2022 exam  when it measured 14.1 cm.  2. Multiple prominent retroperitoneal lymph nodes with adjacent areas  of fat stranding, for example 1.4 cm short axis left periaortic node,  not significantly changed as compared to 8/5/2022 exam, remains  indeterminate.  3. Diffuse urinary bladder wall thickening, nonspecific, can be seen  with chronic bladder outlet obstruction or chronic cystitis.  4. The previously  seen left apical ground-glass nodular opacity on  8/5/2022 exam, almost completely resolved, likely represent resolved  infectious nodule.      ASSESSMENT AND PLAN:    Assessment:  Juvenal Blake is a 57 year old male with  refractory follicular lymphoma, , Day +276 of Yescarta CAR-T therapy.     1. Follicular Lymphoma/Yescarta CAR-T:    Relapsed after BR, OCHOP, NAM NK (9/1/20), idelalisib (~4-6/2020),  (6/28/21), polatuzumab (12/21/21). (hx Rituxan reaction).      Now s/p YESCARTa  In CR at 9 months.      Next restaging in 3 months.      2. HEME: stable counts.plts 113 - better.   ANC recovered.        3. ID:  recent COVID in august.  Consider Evusheld in December.      #Prophy: acyclovir 400mg BID.   Completed  pentamadine x 6 months    #S/p 3 covid shots;   No need for vaccination  post CART given he did not have auto-trasplant .   Evusheld 300mcg given 3/17/2022 - next due in Dec 2022 (now had COVID so ok to delay)     4. GI:    - omeprazole for acid reflux  - hx medical cannibis     5. Renal/FEN: Creat, lytes wnl.  Avoid NSAIDs.    6. CV:    History of stable angina and NSTEMI type II.  Evaluated by cardiology prior to transplant, CTA coronary artery and echo completed.        7. : recc to r/u with urology re bladder wall thickening  #hx Prostatic adenocarcinoma: s/p radical prostatectomy and bilateral lymph node dissection 11/2017. Completed radiation to the prostate fossa and pelvic lymph nodes at [facility] early May 2018. He received 4500 cGy in 25 fractions. He then had 2340 cGy boost in 13 fractions to the prostatic fossa, for a total dose of 6240 cGy in 38 treatment fractions delivered over 54 days. He did not receive hormonal therapy:    - PSA 1.21 on 1/25/22, 0.71 on 9/10/21       Plan:  Cancel Nov visit  Next restaging at 1 year  Cont disability for now      Rosa Terry MD

## 2022-10-20 NOTE — PROGRESS NOTES
Juvenal is a 57 year old who is being evaluated via a billable video visit.      How would you like to obtain your AVS? MyChart  If the video visit is dropped, the invitation should be resent by: Text to cell phone: [phone]  Will anyone else be joining your video visit? Yes: Nancy - spouse may join. How would they like to receive their invitation? Other e-mail: xxx        Video-Visit Details    Video Start Time:10:30  Type of service:  Video Visit    Video End Time:10:55  Originating Location (pt. Location): Home        Distant Location (provider location):  Off-site    Platform used for Video Visit: Hina Duarteaskill      CELL THERAPY ANNIVERSARY VISIT.    Juvenal is 9 months after Yescarta infusion for his multiply relapsed follicular B-cell lymphoma.  He has been in CR and hd restaging now.     INTERIM HISTORY:  Fighting the cold, on antibiotics now also from recent cat bite.  Arm is still sore but healing ok.  The stomach is sore especially when he drives or exercises, he is not working at this time , on disability and interested to stay on it for some time.  No weight loss, no N/V, moving around well.     On exam: AO, in good spirits. Not ill apearring    Lab Results   Component Value Date    WBC 5.4 10/19/2022    ANEU 3.1 04/18/2022    HGB 11.6 (L) 10/19/2022    HCT 34.5 (L) 10/19/2022     (L) 10/19/2022     09/14/2022    POTASSIUM 4.0 09/14/2022    CHLORIDE 102 09/14/2022    CO2 27 09/14/2022     (H) 09/14/2022    BUN 14.9 09/14/2022    CR 1.07 09/14/2022    MAG 2.1 09/14/2022    INR 0.96 02/17/2022    AST 25 09/14/2022    ALT 31 09/14/2022         CT CAP 10/19/2022                                                                   IMPRESSION: In this patient with history of follicular lymphoma, there  is;  1. The spleen is mildly enlarged measuring 13.7 cm in cranial caudal  dimension, slightly decreased in size as compared to 8/5/2022 exam  when it measured 14.1 cm.  2. Multiple  prominent retroperitoneal lymph nodes with adjacent areas  of fat stranding, for example 1.4 cm short axis left periaortic node,  not significantly changed as compared to 8/5/2022 exam, remains  indeterminate.  3. Diffuse urinary bladder wall thickening, nonspecific, can be seen  with chronic bladder outlet obstruction or chronic cystitis.  4. The previously seen left apical ground-glass nodular opacity on  8/5/2022 exam, almost completely resolved, likely represent resolved  infectious nodule.      ASSESSMENT AND PLAN:    Assessment:  Juvenal Blake is a 57 year old male with  refractory follicular lymphoma, , Day +276 of Yescarta CAR-T therapy.     1. Follicular Lymphoma/Yescarta CAR-T:    Relapsed after BR, OCHOP, NAM NK (9/1/20), idelalisib (~4-6/2020),  (6/28/21), polatuzumab (12/21/21). (hx Rituxan reaction).      Now s/p YESCARTa  In CR at 9 months.      Next restaging in 3 months.      2. HEME: stable counts.plts 113 - better.   ANC recovered.        3. ID:  recent COVID in august.  Consider Evusheld in December.      #Prophy: acyclovir 400mg BID.   Completed  pentamadine x 6 months    #S/p 3 covid shots;   No need for vaccination  post CART given he did not have auto-trasplant .   Evusheld 300mcg given 3/17/2022 - next due in Dec 2022 (now had COVID so ok to delay)     4. GI:    - omeprazole for acid reflux  - hx medical cannibis     5. Renal/FEN: Creat, lytes wnl.  Avoid NSAIDs.    6. CV:    History of stable angina and NSTEMI type II.  Evaluated by cardiology prior to transplant, CTA coronary artery and echo completed.        7. : recc to r/u with urology re bladder wall thickening  #hx Prostatic adenocarcinoma: s/p radical prostatectomy and bilateral lymph node dissection 11/2017. Completed radiation to the prostate fossa and pelvic lymph nodes at [facility] early May 2018. He received 4500 cGy in 25 fractions. He then had 2340 cGy boost in 13 fractions to the prostatic fossa, for a total  dose of 6240 cGy in 38 treatment fractions delivered over 54 days. He did not receive hormonal therapy:    - PSA 1.21 on 1/25/22, 0.71 on 9/10/21       Plan:  Cancel Nov visit  Next restaging at 1 year  Cont disability for now      Rosa Terry MD

## 2022-12-05 DIAGNOSIS — C82.00 FOLLICULAR LYMPHOMA GRADE I, UNSPECIFIED BODY REGION (H): Primary | ICD-10-CM

## 2022-12-05 DIAGNOSIS — C82.08 FOLLICULAR LYMPHOMA GRADE I, LYMPH NODES OF MULTIPLE SITES (H): ICD-10-CM

## 2023-01-16 DIAGNOSIS — C82.00 FOLLICULAR LYMPHOMA GRADE I, UNSPECIFIED BODY REGION (H): Primary | ICD-10-CM

## 2023-01-16 DIAGNOSIS — C82.08 FOLLICULAR LYMPHOMA GRADE I, LYMPH NODES OF MULTIPLE SITES (H): ICD-10-CM

## 2023-01-18 ENCOUNTER — ANCILLARY PROCEDURE (OUTPATIENT)
Dept: CT IMAGING | Facility: CLINIC | Age: 58
End: 2023-01-18
Payer: COMMERCIAL

## 2023-01-18 ENCOUNTER — LAB (OUTPATIENT)
Dept: LAB | Facility: CLINIC | Age: 58
End: 2023-01-18
Payer: COMMERCIAL

## 2023-01-18 DIAGNOSIS — C82.08 FOLLICULAR LYMPHOMA GRADE I, LYMPH NODES OF MULTIPLE SITES (H): ICD-10-CM

## 2023-01-18 DIAGNOSIS — C82.00 FOLLICULAR LYMPHOMA GRADE I, UNSPECIFIED BODY REGION (H): ICD-10-CM

## 2023-01-18 DIAGNOSIS — C82.08 FOLLICULAR LYMPHOMA GRADE I, LYMPH NODES OF MULTIPLE SITES (H): Primary | ICD-10-CM

## 2023-01-18 LAB
ALBUMIN SERPL BCG-MCNC: 4.3 G/DL (ref 3.5–5.2)
ALP SERPL-CCNC: 78 U/L (ref 40–129)
ALT SERPL W P-5'-P-CCNC: 28 U/L (ref 10–50)
ANION GAP SERPL CALCULATED.3IONS-SCNC: 11 MMOL/L (ref 7–15)
AST SERPL W P-5'-P-CCNC: 19 U/L (ref 10–50)
BASOPHILS # BLD AUTO: 0 10E3/UL (ref 0–0.2)
BASOPHILS NFR BLD AUTO: 0 %
BILIRUB SERPL-MCNC: 0.4 MG/DL
BUN SERPL-MCNC: 11 MG/DL (ref 8–23)
CALCIUM SERPL-MCNC: 9.3 MG/DL (ref 8.6–10)
CHLORIDE SERPL-SCNC: 104 MMOL/L (ref 98–107)
CREAT SERPL-MCNC: 1.06 MG/DL (ref 0.67–1.17)
DEPRECATED HCO3 PLAS-SCNC: 25 MMOL/L (ref 22–29)
EOSINOPHIL # BLD AUTO: 0.1 10E3/UL (ref 0–0.7)
EOSINOPHIL NFR BLD AUTO: 2 %
ERYTHROCYTE [DISTWIDTH] IN BLOOD BY AUTOMATED COUNT: 16 % (ref 10–15)
GFR SERPL CREATININE-BSD FRML MDRD: 81 ML/MIN/1.73M2
GLUCOSE SERPL-MCNC: 127 MG/DL (ref 70–99)
HCT VFR BLD AUTO: 34.1 % (ref 40–53)
HGB BLD-MCNC: 11.5 G/DL (ref 13.3–17.7)
IMM GRANULOCYTES # BLD: 0 10E3/UL
IMM GRANULOCYTES NFR BLD: 1 %
LYMPHOCYTES # BLD AUTO: 0.6 10E3/UL (ref 0.8–5.3)
LYMPHOCYTES NFR BLD AUTO: 9 %
MAGNESIUM SERPL-MCNC: 1.9 MG/DL (ref 1.7–2.3)
MCH RBC QN AUTO: 33.2 PG (ref 26.5–33)
MCHC RBC AUTO-ENTMCNC: 33.7 G/DL (ref 31.5–36.5)
MCV RBC AUTO: 99 FL (ref 78–100)
MONOCYTES # BLD AUTO: 0.4 10E3/UL (ref 0–1.3)
MONOCYTES NFR BLD AUTO: 7 %
NEUTROPHILS # BLD AUTO: 5.1 10E3/UL (ref 1.6–8.3)
NEUTROPHILS NFR BLD AUTO: 81 %
NRBC # BLD AUTO: 0 10E3/UL
NRBC BLD AUTO-RTO: 0 /100
PHOSPHATE SERPL-MCNC: 3.4 MG/DL (ref 2.5–4.5)
PLATELET # BLD AUTO: 113 10E3/UL (ref 150–450)
POTASSIUM SERPL-SCNC: 3.8 MMOL/L (ref 3.4–5.3)
PROT SERPL-MCNC: 6.3 G/DL (ref 6.4–8.3)
RBC # BLD AUTO: 3.46 10E6/UL (ref 4.4–5.9)
SODIUM SERPL-SCNC: 140 MMOL/L (ref 136–145)
WBC # BLD AUTO: 6.2 10E3/UL (ref 4–11)

## 2023-01-18 PROCEDURE — 80053 COMPREHEN METABOLIC PANEL: CPT

## 2023-01-18 PROCEDURE — 70491 CT SOFT TISSUE NECK W/DYE: CPT | Mod: GC | Performed by: RADIOLOGY

## 2023-01-18 PROCEDURE — 82784 ASSAY IGA/IGD/IGG/IGM EACH: CPT

## 2023-01-18 PROCEDURE — 84100 ASSAY OF PHOSPHORUS: CPT

## 2023-01-18 PROCEDURE — 86360 T CELL ABSOLUTE COUNT/RATIO: CPT

## 2023-01-18 PROCEDURE — 71260 CT THORAX DX C+: CPT | Performed by: RADIOLOGY

## 2023-01-18 PROCEDURE — 83735 ASSAY OF MAGNESIUM: CPT

## 2023-01-18 PROCEDURE — 74177 CT ABD & PELVIS W/CONTRAST: CPT | Performed by: RADIOLOGY

## 2023-01-18 PROCEDURE — 36591 DRAW BLOOD OFF VENOUS DEVICE: CPT

## 2023-01-18 PROCEDURE — 85025 COMPLETE CBC W/AUTO DIFF WBC: CPT

## 2023-01-18 PROCEDURE — 250N000011 HC RX IP 250 OP 636

## 2023-01-18 RX ORDER — HEPARIN SODIUM (PORCINE) LOCK FLUSH IV SOLN 100 UNIT/ML 100 UNIT/ML
500 SOLUTION INTRAVENOUS ONCE
Status: COMPLETED | OUTPATIENT
Start: 2023-01-18 | End: 2023-01-18

## 2023-01-18 RX ORDER — IOPAMIDOL 755 MG/ML
125 INJECTION, SOLUTION INTRAVASCULAR ONCE
Status: COMPLETED | OUTPATIENT
Start: 2023-01-18 | End: 2023-01-18

## 2023-01-18 RX ORDER — HEPARIN SODIUM (PORCINE) LOCK FLUSH IV SOLN 100 UNIT/ML 100 UNIT/ML
5 SOLUTION INTRAVENOUS ONCE
Status: COMPLETED | OUTPATIENT
Start: 2023-01-18 | End: 2023-01-18

## 2023-01-18 RX ADMIN — IOPAMIDOL 125 ML: 755 INJECTION, SOLUTION INTRAVASCULAR at 16:25

## 2023-01-18 RX ADMIN — Medication 5 ML: at 15:38

## 2023-01-18 RX ADMIN — HEPARIN SODIUM (PORCINE) LOCK FLUSH IV SOLN 100 UNIT/ML 500 UNITS: 100 SOLUTION at 16:31

## 2023-01-18 NOTE — NURSING NOTE
Chief Complaint   Patient presents with     Port Draw     Labs drawn via port by RN in lab.      Port accessed and labs drawn by RN.     Merle Mendez RN

## 2023-01-19 ENCOUNTER — OFFICE VISIT (OUTPATIENT)
Dept: TRANSPLANT | Facility: CLINIC | Age: 58
End: 2023-01-19
Payer: COMMERCIAL

## 2023-01-19 VITALS
DIASTOLIC BLOOD PRESSURE: 78 MMHG | OXYGEN SATURATION: 98 % | HEART RATE: 71 BPM | WEIGHT: 229.6 LBS | RESPIRATION RATE: 16 BRPM | SYSTOLIC BLOOD PRESSURE: 123 MMHG | TEMPERATURE: 98.5 F | BODY MASS INDEX: 35.41 KG/M2

## 2023-01-19 DIAGNOSIS — C82.00 FOLLICULAR LYMPHOMA GRADE I, UNSPECIFIED BODY REGION (H): Primary | ICD-10-CM

## 2023-01-19 DIAGNOSIS — D72.818 LOW CD4 CELL COUNT DETERMINED BY FLOW CYTOMETRY: ICD-10-CM

## 2023-01-19 LAB
CD19 CELLS # BLD: 67 CELLS/UL (ref 107–698)
CD19 CELLS NFR BLD: 12 % (ref 6–27)
CD3 CELLS # BLD: 229 CELLS/UL (ref 603–2990)
CD3 CELLS NFR BLD: 40 % (ref 49–84)
CD3+CD4+ CELLS # BLD: 126 CELLS/UL (ref 441–2156)
CD3+CD4+ CELLS NFR BLD: 22 % (ref 28–63)
CD3+CD4+ CELLS/CD3+CD8+ CLL BLD: 1.3 % (ref 1.4–2.6)
CD3+CD8+ CELLS # BLD: 97 CELLS/UL (ref 125–1312)
CD3+CD8+ CELLS NFR BLD: 17 % (ref 10–40)
CD3-CD16+CD56+ CELLS # BLD: 261 CELLS/UL (ref 95–640)
CD3-CD16+CD56+ CELLS NFR BLD: 46 % (ref 4–25)
IGG SERPL-MCNC: 182 MG/DL (ref 610–1616)
T CELL EXTENDED COMMENT: ABNORMAL

## 2023-01-19 PROCEDURE — G0009 ADMIN PNEUMOCOCCAL VACCINE: HCPCS

## 2023-01-19 PROCEDURE — 90670 PCV13 VACCINE IM: CPT

## 2023-01-19 PROCEDURE — M0220 HC INJECTION TIXAGEVIMAB & CILGAVIMAB (EVUSHELD): HCPCS

## 2023-01-19 PROCEDURE — G0463 HOSPITAL OUTPT CLINIC VISIT: HCPCS

## 2023-01-19 PROCEDURE — 250N000011 HC RX IP 250 OP 636

## 2023-01-19 PROCEDURE — 99214 OFFICE O/P EST MOD 30 MIN: CPT

## 2023-01-19 RX ORDER — MULTIPLE VITAMINS W/ MINERALS TAB 9MG-400MCG
1 TAB ORAL DAILY
COMMUNITY

## 2023-01-19 RX ADMIN — AZD7442 6 ML: KIT at 15:01

## 2023-01-19 RX ADMIN — PNEUMOCOCCAL 13-VALENT CONJUGATE VACCINE 0.5 ML: 2.2; 2.2; 2.2; 2.2; 2.2; 4.4; 2.2; 2.2; 2.2; 2.2; 2.2; 2.2; 2.2 INJECTION, SUSPENSION INTRAMUSCULAR at 15:01

## 2023-01-19 ASSESSMENT — PAIN SCALES - GENERAL: PAINLEVEL: SEVERE PAIN (7)

## 2023-01-19 NOTE — LETTER
1/19/2023         RE: Juvenal Blake  [address] [place] [address]        Dear Colleague,    Thank you for referring your patient, Juvenal Blake, to the [facility] TRANSPL[facility] [place]. Please see a copy of my visit note below.        CELL THERAPY ANNIVERSARY VISIT.    Juvenal is 12 months after Yescarta infusion for his multiply relapsed follicular B-cell lymphoma.  He has been in CR and had restaging now.     INTERIM HISTORY: feeling very well, some aches and less energy but overall no acute changes to his health.     he is not working at this time , on disability and interested to stay on it for some time.  No weight loss, no N/V, moving around well.     On exam: AO, in good spirits. Not ill apearring    Lab Results   Component Value Date    WBC 6.2 01/18/2023    ANEU 3.1 04/18/2022    HGB 11.5 (L) 01/18/2023    HCT 34.1 (L) 01/18/2023     (L) 01/18/2023     01/18/2023    POTASSIUM 3.8 01/18/2023    CHLORIDE 104 01/18/2023    CO2 25 01/18/2023     (H) 01/18/2023    BUN 11.0 01/18/2023    CR 1.06 01/18/2023    MAG 1.9 01/18/2023    INR 0.96 02/17/2022    AST 19 01/18/2023    ALT 28 01/18/2023   CD4 count 126  CD19 count 67  CT CAP 1/18/2023  IMPRESSION:  No significant interval change or findings to suggest progression of disease.  CT neck 1/18/2023                                                                   IMPRESSION:   1. No suspicious cervical lymphadenopathy.  2. Internal carotid artery atherosclerosis, with left greater than  right internal carotid artery narrowing. Consider ultrasound or  CTA/MRA for further evaluation.       ASSESSMENT AND PLAN:    Assessment:  Juvenal Blake is a 57 year old male with  refractory follicular lymphoma, , 1 year after Yescarta CAR-T therapy.     1. Follicular Lymphoma/Yescarta CAR-T:    Relapsed after BR, OCHOP, NAM NK (9/1/20), idelalisib (~4-6/2020),  (6/28/21), polatuzumab (12/21/21). (hx  Rituxan reaction).      Now s/p YESCARTa  In CR at 12 months. This is a great news!     Next restaging at 18 months.     2. HEME: stable counts.plts 113 - better.   ANC recovered.      3. ID:  recent COVID in august.  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 Pt will need IVIG replacement,       #Prophy: acyclovir 400mg BID.   Completed  pentamadine x 6 months  CD4 is still lowish 126- I rec to resume Pentamidine x 6 more months         4. GI:    - omeprazole for acid reflux  - hx medical cannibis     5. Renal/FEN: Creat, lytes wnl.  Avoid NSAIDs.    6. CV:    History of stable angina and NSTEMI type II.  Evaluated by cardiology prior to transplant, CTA coronary artery and echo completed.    Carotid artery plaque - we asked to follow-up with PCP      7. : recc to r/u with urology re bladder wall thickening  #hx Prostatic adenocarcinoma: s/p radical prostatectomy and bilateral lymph node dissection 11/2017. Completed radiation to the prostate fossa and pelvic lymph nodes at [facility] early May 2018. He received 4500 cGy in 25 fractions. He then had 2340 cGy boost in 13 fractions to the prostatic fossa, for a total dose of 6240 cGy in 38 treatment fractions delivered over 54 days. He did not receive hormonal therapy:    - PSA 1.21 on 1/25/22, 0.71 on 9/10/21       Plan:  F/u in 3 months with NAYA  Resume Pentamidine monthly x 6 - to be scheduled at [facility] IVIG replacement - next week       Rosa Terry MD

## 2023-01-19 NOTE — PROGRESS NOTES
CELL THERAPY ANNIVERSARY VISIT.    Juvenal is 12 months after Yescarta infusion for his multiply relapsed follicular B-cell lymphoma.  He has been in CR and had restaging now.     INTERIM HISTORY: feeling very well, some aches and less energy but overall no acute changes to his health.     he is not working at this time , on disability and interested to stay on it for some time.  No weight loss, no N/V, moving around well.     On exam: AO, in good spirits. Not ill apearring    Lab Results   Component Value Date    WBC 6.2 01/18/2023    ANEU 3.1 04/18/2022    HGB 11.5 (L) 01/18/2023    HCT 34.1 (L) 01/18/2023     (L) 01/18/2023     01/18/2023    POTASSIUM 3.8 01/18/2023    CHLORIDE 104 01/18/2023    CO2 25 01/18/2023     (H) 01/18/2023    BUN 11.0 01/18/2023    CR 1.06 01/18/2023    MAG 1.9 01/18/2023    INR 0.96 02/17/2022    AST 19 01/18/2023    ALT 28 01/18/2023   CD4 count 126  CD19 count 67  CT CAP 1/18/2023  IMPRESSION:  No significant interval change or findings to suggest progression of disease.  CT neck 1/18/2023                                                                   IMPRESSION:   1. No suspicious cervical lymphadenopathy.  2. Internal carotid artery atherosclerosis, with left greater than  right internal carotid artery narrowing. Consider ultrasound or  CTA/MRA for further evaluation.       ASSESSMENT AND PLAN:    Assessment:  Juvenal Blake is a 57 year old male with  refractory follicular lymphoma, , 1 year after Yescarta CAR-T therapy.     1. Follicular Lymphoma/Yescarta CAR-T:    Relapsed after BR, OCHOP, NAM NK (9/1/20), idelalisib (~4-6/2020),  (6/28/21), polatuzumab (12/21/21). (hx Rituxan reaction).      Now s/p YESCARTa  In CR at 12 months. This is a great news!     Next restaging at 18 months.     2. HEME: stable counts.plts 113 - better.   ANC recovered.      3. ID:  recent COVID in august.  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 Pt will need IVIG replacement,       #Prophy: acyclovir 400mg BID.   Completed  pentamadine x 6 months  CD4 is still lowish 126- I [facility] to resume Pentamidine x 6 more months         4. GI:    - omeprazole for acid reflux  - hx medical cannibis     5. Renal/FEN: Creat, lytes wnl.  Avoid NSAIDs.    6. CV:    History of stable angina and NSTEMI type II.  Evaluated by cardiology prior to transplant, CTA coronary artery and echo completed.    Carotid artery plaque - we asked to follow-up with PCP      7. : recc to r/u with urology re bladder wall thickening  #hx Prostatic adenocarcinoma: s/p radical prostatectomy and bilateral lymph node dissection 11/2017. Completed radiation to the prostate fossa and pelvic lymph nodes at [facility] early May 2018. He received 4500 cGy in 25 fractions. He then had 2340 cGy boost in 13 fractions to the prostatic fossa, for a total dose of 6240 cGy in 38 treatment fractions delivered over 54 days. He did not receive hormonal therapy:    - PSA 1.21 on 1/25/22, 0.71 on 9/10/21       Plan:  F/u in 3 months with NAYA  Resume Pentamidine monthly x 6 - to be scheduled at [facility] IVIG replacement - next week       Rosa Terry MD

## 2023-01-19 NOTE — NURSING NOTE
EVUSHELD Administration Note:  Juvenal Blake presents today for EVUSHELD.   present during visit today: Not Applicable.    Consent:   Evusheld Consent   Confirmed patient received the Emergency Use Authorization Fact Sheet for Patients, Parents and Caregivers prior to receiving medication. We discussed the following risks and benefits of receiving EVUSHELD, as well as alternative treatments and the patient wished to proceed with EVUSHELD.     Providers believe the benefits of Evusheld outweigh the risks for all moderately to severely immunocompromised patients.      Benefits:   Evusheld is a synthetic antibody that provides protection against COVID-19 for 6 months and is recommended for patients that have a weakened immune system that may not be able to make antibodies themselves.     Risks:    There is a very small risk of allergic reactions and you should notify us if you have any symptoms of an allergic reaction after the injection. There were also some extremely rare cardiac events reported in the initial trials in people that already had heart disease, but experts do not think these were caused by the medication. We will observe you for any chest pain or trouble breathing after the injections and you can reach out to your provider if any of these symptoms develop.     Alternatives:   Vaccines to prevent COVID-19 are approved or available under Emergency Use Authorization. Use of EVUSHELD does not replace vaccination against COVID-19. You can continue to mask and isolate to avoid infections. It is your choice to receive or not receive EVUSHELD. Should you decide not to receive EVUSHELD, it will not change your standard medical care.     Patient does consent to the injection.        Post Injection Assessment:   Patient tolerated injection without incident.  Site patent and intact, free from redness, edema or discomfort.      Patient was observed in the room for a minimum of 10 minutes after injection  per standard, then remained in the buidling for a total 60 minute observation period.         Discharge Plan:    Patient and/or family verbalized understanding of discharge instructions and all questions answered.  Patient discharged in stable condition accompanied by: self.     Tennille Oneil RN

## 2023-01-19 NOTE — NURSING NOTE
"Oncology Rooming Note    January 19, 2023 12:27 PM   Juvenal Blake is a 57 year old male who presents for:    Chief Complaint   Patient presents with     Oncology Clinic Visit     [facility] RETURN - DLBCL     Initial Vitals: /78 (BP Location: Right arm, Patient Position: Chair, Cuff Size: Adult Large)   Pulse 71   Temp 98.5  F (36.9  C) (Oral)   Resp 16   Wt 104.1 kg (229 lb 9.6 oz)   SpO2 98%   BMI 35.41 kg/m   Estimated body mass index is 35.41 kg/m  as calculated from the following:    Height as of 1/17/22: 1.715 m (5' 7.52\").    Weight as of this encounter: 104.1 kg (229 lb 9.6 oz). Body surface area is 2.23 meters squared.  Severe Pain (7) Comment: Data Unavailable   No LMP for male patient.  Allergies reviewed: Yes  Medications reviewed: Yes    Medications: Medication refills not needed today.  Pharmacy name entered into [facility]:    [id number] - [place], [place] - [address] AT [facility] - [place], [place] - [address] [place], [place] [address]YAHIR            "

## 2023-01-25 RX ORDER — METHYLPREDNISOLONE SODIUM SUCCINATE 125 MG/2ML
125 INJECTION, POWDER, LYOPHILIZED, FOR SOLUTION INTRAMUSCULAR; INTRAVENOUS
Status: CANCELLED
Start: 2023-01-30

## 2023-01-25 RX ORDER — ALBUTEROL SULFATE 90 UG/1
1-2 AEROSOL, METERED RESPIRATORY (INHALATION)
Status: CANCELLED
Start: 2023-01-30

## 2023-01-25 RX ORDER — EPINEPHRINE 1 MG/ML
0.3 INJECTION, SOLUTION INTRAMUSCULAR; SUBCUTANEOUS EVERY 5 MIN PRN
Status: CANCELLED | OUTPATIENT
Start: 2023-01-30

## 2023-01-25 RX ORDER — ACETAMINOPHEN 325 MG/1
650 TABLET ORAL ONCE
Status: CANCELLED | OUTPATIENT
Start: 2023-01-30

## 2023-01-25 RX ORDER — DIPHENHYDRAMINE HYDROCHLORIDE 50 MG/ML
50 INJECTION INTRAMUSCULAR; INTRAVENOUS
Status: CANCELLED
Start: 2023-01-30

## 2023-01-25 RX ORDER — SULFAMETHOXAZOLE/TRIMETHOPRIM 800-160 MG
1 TABLET ORAL 2 TIMES DAILY
Qty: 16 TABLET | Refills: 5 | Status: SHIPPED | OUTPATIENT
Start: 2023-01-25 | End: 2023-08-02

## 2023-01-25 RX ORDER — MEPERIDINE HYDROCHLORIDE 25 MG/ML
25 INJECTION INTRAMUSCULAR; INTRAVENOUS; SUBCUTANEOUS EVERY 30 MIN PRN
Status: CANCELLED | OUTPATIENT
Start: 2023-01-30

## 2023-01-25 RX ORDER — DIPHENHYDRAMINE HCL 25 MG
50 CAPSULE ORAL ONCE
Status: CANCELLED | OUTPATIENT
Start: 2023-01-30

## 2023-01-25 RX ORDER — HEPARIN SODIUM,PORCINE 10 UNIT/ML
5 VIAL (ML) INTRAVENOUS
Status: CANCELLED | OUTPATIENT
Start: 2023-01-30

## 2023-01-25 RX ORDER — NALOXONE HYDROCHLORIDE 0.4 MG/ML
0.2 INJECTION, SOLUTION INTRAMUSCULAR; INTRAVENOUS; SUBCUTANEOUS
Status: CANCELLED | OUTPATIENT
Start: 2023-01-30

## 2023-01-25 RX ORDER — HEPARIN SODIUM (PORCINE) LOCK FLUSH IV SOLN 100 UNIT/ML 100 UNIT/ML
5 SOLUTION INTRAVENOUS
Status: CANCELLED | OUTPATIENT
Start: 2023-01-30

## 2023-01-25 RX ORDER — ALBUTEROL SULFATE 0.83 MG/ML
2.5 SOLUTION RESPIRATORY (INHALATION)
Status: CANCELLED | OUTPATIENT
Start: 2023-01-30

## 2023-01-27 ENCOUNTER — PATIENT OUTREACH (OUTPATIENT)
Dept: ONCOLOGY | Facility: CLINIC | Age: 58
End: 2023-01-27
Payer: COMMERCIAL

## 2023-01-27 NOTE — PROGRESS NOTES
"[facility]: Cancer Care Short Note                                    Discussion with Patient:                                                      OUTBOUND CALL: RNCC called patient and LVM. Introduced self as RNCC working with Dr. Terry's patients. Calling to discuss lab results. Callback requested at number below.     INBOUND CALL: Callback received from patient. RNCC introduced self as RNCC working with Dr. Terry's [facility] patients. Will transition him to [facility] team going forward.  Informed pt that CD4 and IgG levels are low--- dr. Terry would like him to receive a dose of IVIG next week (appt made for 2/1). Should would also like him to start Bactrim due to CD4 level being low. Sent to local [facility]. Reviewed directions \"Take 1 tablet by mouth 2 times daily On Mo and Tue only\".   Will send pt Informative message with contact info for RNCC.       Intervention/Education provided during outreach:                                                         Patient to call/[employer] message with updates  Medication Change: Discussed medication change with patient    Patient verbalizes understanding of POC and has no other questions or concerns at this time.     Suri Landa, RN, MSN, OCN   RN Care Coordinator   [facility]   [address] [place] [id number]   [phone]             "

## 2023-01-29 ENCOUNTER — HEALTH MAINTENANCE LETTER (OUTPATIENT)
Age: 58
End: 2023-01-29

## 2023-02-01 ENCOUNTER — INFUSION THERAPY VISIT (OUTPATIENT)
Dept: TRANSPLANT | Facility: CLINIC | Age: 58
End: 2023-02-01
Payer: COMMERCIAL

## 2023-02-01 VITALS
WEIGHT: 232.8 LBS | HEART RATE: 70 BPM | OXYGEN SATURATION: 99 % | DIASTOLIC BLOOD PRESSURE: 79 MMHG | TEMPERATURE: 98.5 F | SYSTOLIC BLOOD PRESSURE: 145 MMHG | RESPIRATION RATE: 16 BRPM | BODY MASS INDEX: 35.9 KG/M2

## 2023-02-01 DIAGNOSIS — C83.398 DIFFUSE LARGE B-CELL LYMPHOMA OF EXTRANODAL SITE EXCLUDING SPLEEN AND OTHER SOLID ORGANS: Primary | ICD-10-CM

## 2023-02-01 DIAGNOSIS — C82.00 FOLLICULAR LYMPHOMA GRADE I, UNSPECIFIED BODY REGION (H): ICD-10-CM

## 2023-02-01 DIAGNOSIS — C82.08 FOLLICULAR LYMPHOMA GRADE I, LYMPH NODES OF MULTIPLE SITES (H): Primary | ICD-10-CM

## 2023-02-01 DIAGNOSIS — D80.1 HYPOGAMMAGLOBULINEMIA (H): ICD-10-CM

## 2023-02-01 DIAGNOSIS — C82.08 FOLLICULAR LYMPHOMA GRADE I, LYMPH NODES OF MULTIPLE SITES (H): ICD-10-CM

## 2023-02-01 PROCEDURE — 250N000011 HC RX IP 250 OP 636

## 2023-02-01 PROCEDURE — 96366 THER/PROPH/DIAG IV INF ADDON: CPT

## 2023-02-01 PROCEDURE — 250N000013 HC RX MED GY IP 250 OP 250 PS 637

## 2023-02-01 PROCEDURE — 94640 AIRWAY INHALATION TREATMENT: CPT | Performed by: INTERNAL MEDICINE

## 2023-02-01 PROCEDURE — 96365 THER/PROPH/DIAG IV INF INIT: CPT

## 2023-02-01 PROCEDURE — 94642 AEROSOL INHALATION TREATMENT: CPT | Performed by: INTERNAL MEDICINE

## 2023-02-01 RX ORDER — EPINEPHRINE 1 MG/ML
0.3 INJECTION, SOLUTION, CONCENTRATE INTRAVENOUS EVERY 5 MIN PRN
Status: CANCELLED | OUTPATIENT
Start: 2023-02-22

## 2023-02-01 RX ORDER — PENTAMIDINE ISETHIONATE 300 MG/300MG
300 INHALANT RESPIRATORY (INHALATION) ONCE
Status: CANCELLED
Start: 2023-02-22 | End: 2023-02-22

## 2023-02-01 RX ORDER — ALBUTEROL SULFATE 0.83 MG/ML
2.5 SOLUTION RESPIRATORY (INHALATION) ONCE
Status: CANCELLED
Start: 2023-02-22 | End: 2023-02-22

## 2023-02-01 RX ORDER — MEPERIDINE HYDROCHLORIDE 25 MG/ML
25 INJECTION INTRAMUSCULAR; INTRAVENOUS; SUBCUTANEOUS EVERY 30 MIN PRN
Status: CANCELLED | OUTPATIENT
Start: 2023-02-22

## 2023-02-01 RX ORDER — DIPHENHYDRAMINE HCL 25 MG
50 CAPSULE ORAL ONCE
Status: COMPLETED | OUTPATIENT
Start: 2023-02-01 | End: 2023-02-01

## 2023-02-01 RX ORDER — METHYLPREDNISOLONE SODIUM SUCCINATE 125 MG/2ML
125 INJECTION, POWDER, LYOPHILIZED, FOR SOLUTION INTRAMUSCULAR; INTRAVENOUS
Status: CANCELLED
Start: 2023-02-22

## 2023-02-01 RX ORDER — HEPARIN SODIUM (PORCINE) LOCK FLUSH IV SOLN 100 UNIT/ML 100 UNIT/ML
5 SOLUTION INTRAVENOUS
Status: CANCELLED | OUTPATIENT
Start: 2023-02-01

## 2023-02-01 RX ORDER — PENTAMIDINE ISETHIONATE 300 MG/300MG
300 INHALANT RESPIRATORY (INHALATION) ONCE
Status: COMPLETED | OUTPATIENT
Start: 2023-02-01 | End: 2023-02-01

## 2023-02-01 RX ORDER — NALOXONE HYDROCHLORIDE 0.4 MG/ML
0.2 INJECTION, SOLUTION INTRAMUSCULAR; INTRAVENOUS; SUBCUTANEOUS
Status: CANCELLED | OUTPATIENT
Start: 2023-02-22

## 2023-02-01 RX ORDER — ALBUTEROL SULFATE 90 UG/1
1-2 AEROSOL, METERED RESPIRATORY (INHALATION)
Status: CANCELLED
Start: 2023-02-22

## 2023-02-01 RX ORDER — ALBUTEROL SULFATE 0.83 MG/ML
2.5 SOLUTION RESPIRATORY (INHALATION)
Status: CANCELLED | OUTPATIENT
Start: 2023-02-22

## 2023-02-01 RX ORDER — ALBUTEROL SULFATE 0.83 MG/ML
2.5 SOLUTION RESPIRATORY (INHALATION) ONCE
Status: COMPLETED | OUTPATIENT
Start: 2023-02-01 | End: 2023-02-01

## 2023-02-01 RX ORDER — ACETAMINOPHEN 325 MG/1
650 TABLET ORAL ONCE
Status: COMPLETED | OUTPATIENT
Start: 2023-02-01 | End: 2023-02-01

## 2023-02-01 RX ORDER — HEPARIN SODIUM,PORCINE 10 UNIT/ML
5 VIAL (ML) INTRAVENOUS
Status: CANCELLED | OUTPATIENT
Start: 2023-02-01

## 2023-02-01 RX ORDER — DIPHENHYDRAMINE HYDROCHLORIDE 50 MG/ML
50 INJECTION INTRAMUSCULAR; INTRAVENOUS
Status: CANCELLED
Start: 2023-02-22

## 2023-02-01 RX ORDER — HEPARIN SODIUM (PORCINE) LOCK FLUSH IV SOLN 100 UNIT/ML 100 UNIT/ML
5 SOLUTION INTRAVENOUS
Status: DISCONTINUED | OUTPATIENT
Start: 2023-02-01 | End: 2023-02-01 | Stop reason: HOSPADM

## 2023-02-01 RX ADMIN — HUMAN IMMUNOGLOBULIN G 35 G: 20 LIQUID INTRAVENOUS at 11:31

## 2023-02-01 RX ADMIN — ACETAMINOPHEN 650 MG: 325 TABLET ORAL at 11:01

## 2023-02-01 RX ADMIN — DIPHENHYDRAMINE HYDROCHLORIDE 50 MG: 25 CAPSULE ORAL at 11:00

## 2023-02-01 RX ADMIN — ALBUTEROL SULFATE 2.5 MG: 0.83 SOLUTION RESPIRATORY (INHALATION) at 10:27

## 2023-02-01 RX ADMIN — PENTAMIDINE ISETHIONATE 300 MG: 300 INHALANT RESPIRATORY (INHALATION) at 10:27

## 2023-02-01 RX ADMIN — Medication 5 ML: at 13:15

## 2023-02-01 ASSESSMENT — PAIN SCALES - GENERAL: PAINLEVEL: SEVERE PAIN (7)

## 2023-02-01 NOTE — NURSING NOTE
"Oncology Rooming Note    February 1, 2023 11:08 AM   Juvenal Blake is a 57 year old male who presents for:    Chief Complaint   Patient presents with     Infusion     Scheduled IVIG.  History of lymphoma.     Initial Vitals: /69 (BP Location: Left arm, Patient Position: Sitting, Cuff Size: Adult Large)   Pulse 74   Temp 98.8  F (37.1  C) (Oral)   Resp 18   Wt 105.6 kg (232 lb 12.8 oz)   SpO2 97%   BMI 35.90 kg/m   Estimated body mass index is 35.9 kg/m  as calculated from the following:    Height as of 1/17/22: 1.715 m (5' 7.52\").    Weight as of this encounter: 105.6 kg (232 lb 12.8 oz). Body surface area is 2.24 meters squared.  Severe Pain (7) Comment: Data Unavailable   No LMP for male patient.  Allergies reviewed: Yes  Medications reviewed: Yes    Medications: Medication refills not needed today.  Pharmacy name entered into [employer]:    [facility] #[id number] - [place], [place] - [address] AT [facility] - [place], [place] - [address] [place] [address]    Clinical concerns: none       Cherie Jessica RN              "

## 2023-02-01 NOTE — PROGRESS NOTES
Infusion Nursing Note:  Juvenal MCGRAW Gabrielchristiano presents today for IVIG.    Patient seen by provider today: No   present during visit today: Not Applicable.    Note:   Pt received 650 mg tylenol and 50 mg po benadryl as premeds.    Pt received IVIG starting at 0.5 ml/kg/hr and increasing every 15 minutes by 0.5 ml/kg/hr up to 3.5 ml/kg/hr.      Intravenous Access:  Implanted Port.    Treatment Conditions:  Not Applicable.    Post Infusion Assessment:  Patient tolerated infusion without incident.  Blood return noted pre and post infusion.  Site patent and intact, free from redness, edema or discomfort.  No evidence of extravasations.  Access discontinued per protocol.     Discharge Plan:   Discharge instructions reviewed with: Patient.  Patient discharged in stable condition accompanied by: self.  Departure Mode: Ambulatory.      Cherie Jessica RN

## 2023-02-01 NOTE — PROGRESS NOTES
Patient was seen today for a Pentamidine nebulizer tx ordered by Dr. Butt  Patient was first given 2.5mg of Albuterol via nebulizer, after which Pentamidine 300 mg (Lot # [id number]) mixed with 6cc Sterile H20 was administered through a filtered nebulizer.    Pre-treatment: SpO2 = 97%   HR = 71  BBS = Clear  Post-treatment: SpO2 = 95%  HR =70   BBS = Clear    No adverse side effects noted by the patient.    This service today was provided under the direct supervision of Dr. Lin, who was available if needed.     Procedure was completed by Samanta Cerda.[email or web address]

## 2023-02-01 NOTE — LETTER
2/1/2023         RE: Juvenal Blake  [address] [place] [address]        Dear Colleague,    Thank you for referring your patient, Juvenal Blake, to the [facility] BLOOD AND MARROW TRANSPLANT PROGRAM [place]. Please see a copy of my visit note below.    Infusion Nursing Note:  Juvenal Blake presents today for IVIG.    Patient seen by provider today: No   present during visit today: Not Applicable.    Note:   Pt received 650 mg tylenol and 50 mg po benadryl as premeds.    Pt received IVIG starting at 0.5 ml/kg/hr and increasing every 15 minutes by 0.5 ml/kg/hr up to 3.5 ml/kg/hr.      Intravenous Access:  Implanted Port.    Treatment Conditions:  Not Applicable.    Post Infusion Assessment:  Patient tolerated infusion without incident.  Blood return noted pre and post infusion.  Site patent and intact, free from redness, edema or discomfort.  No evidence of extravasations.  Access discontinued per protocol.     Discharge Plan:   Discharge instructions reviewed with: Patient.  Patient discharged in stable condition accompanied by: self.  Departure Mode: Ambulatory.      Cherie Jessica RN                          Again, thank you for allowing me to participate in the care of your patient.        Sincerely,        BMT Infusion Nurse

## 2023-02-01 NOTE — LETTER
Date:February 2, 2023      Provider requested that no letter be sent. Do not send.       [facility]

## 2023-02-13 DIAGNOSIS — C82.08 FOLLICULAR LYMPHOMA GRADE I, LYMPH NODES OF MULTIPLE SITES (H): ICD-10-CM

## 2023-02-13 RX ORDER — TOLTERODINE 4 MG/1
4 CAPSULE, EXTENDED RELEASE ORAL DAILY
Qty: 90 CAPSULE | Refills: 11 | OUTPATIENT
Start: 2023-02-13

## 2023-02-13 NOTE — TELEPHONE ENCOUNTER
Medication: tolteroine Tart ER 4 mg capsules  Last prescribing provider: Dr. Nation    Last clinic visit date: 11/29/21 with Dr. Nation    Any missed appointments or no-shows since last clinic visit?: No    Recommendations for requested medication (if none, N/A): NA    Next clinic visit date: 4/26/23 with THOMAS Jose    Any other pertinent information (if none, N/A): NA    Pended and Routed to Provider.

## 2023-02-28 ENCOUNTER — PATIENT OUTREACH (OUTPATIENT)
Dept: ONCOLOGY | Facility: CLINIC | Age: 58
End: 2023-02-28
Payer: COMMERCIAL

## 2023-02-28 DIAGNOSIS — C82.08 FOLLICULAR LYMPHOMA GRADE I, LYMPH NODES OF MULTIPLE SITES (H): Primary | ICD-10-CM

## 2023-02-28 NOTE — PROGRESS NOTES
[facility]: Cancer Care Short Note                                    Discussion with Patient:                                                      OUTBOUND CALL: RNCC called patient. Introduced self as RNCC working with patient.   Will cancel NAYA visit for tomorrow-- scheduled in error. Pt will have follow up with NAYA in April.   Will move IVIG to end of the week-- will assess IGG level tomorrow to ensure that pt requires IVIG.       Intervention/Education provided during outreach:                                                         Patient to follow up as scheduled at next appt  Patient to call/[employer] message with updates  Confirmed patient has clinic and triage numbers    Patient verbalizes understanding of POC and has no other questions or concerns at this time.     Suri Landa, RN, MSN, OCN   RN Care Coordinator   [facility]   [address] [place] [id number] [phone]

## 2023-03-01 ENCOUNTER — LAB (OUTPATIENT)
Dept: LAB | Facility: CLINIC | Age: 58
End: 2023-03-01
Payer: COMMERCIAL

## 2023-03-01 DIAGNOSIS — C82.08 FOLLICULAR LYMPHOMA GRADE I, LYMPH NODES OF MULTIPLE SITES (H): ICD-10-CM

## 2023-03-01 DIAGNOSIS — C83.398 DIFFUSE LARGE B-CELL LYMPHOMA OF EXTRANODAL SITE EXCLUDING SPLEEN AND OTHER SOLID ORGANS: Primary | ICD-10-CM

## 2023-03-01 LAB
ALBUMIN SERPL BCG-MCNC: 4.4 G/DL (ref 3.5–5.2)
ALP SERPL-CCNC: 76 U/L (ref 40–129)
ALT SERPL W P-5'-P-CCNC: 27 U/L (ref 10–50)
ANION GAP SERPL CALCULATED.3IONS-SCNC: 11 MMOL/L (ref 7–15)
AST SERPL W P-5'-P-CCNC: 25 U/L (ref 10–50)
BASOPHILS # BLD AUTO: 0 10E3/UL (ref 0–0.2)
BASOPHILS NFR BLD AUTO: 0 %
BILIRUB SERPL-MCNC: 0.3 MG/DL
BUN SERPL-MCNC: 15.5 MG/DL (ref 8–23)
CALCIUM SERPL-MCNC: 9.4 MG/DL (ref 8.6–10)
CHLORIDE SERPL-SCNC: 104 MMOL/L (ref 98–107)
CREAT SERPL-MCNC: 1.27 MG/DL (ref 0.67–1.17)
DEPRECATED HCO3 PLAS-SCNC: 24 MMOL/L (ref 22–29)
EOSINOPHIL # BLD AUTO: 0.1 10E3/UL (ref 0–0.7)
EOSINOPHIL NFR BLD AUTO: 2 %
ERYTHROCYTE [DISTWIDTH] IN BLOOD BY AUTOMATED COUNT: 14.3 % (ref 10–15)
GFR SERPL CREATININE-BSD FRML MDRD: 65 ML/MIN/1.73M2
GLUCOSE SERPL-MCNC: 151 MG/DL (ref 70–99)
HCT VFR BLD AUTO: 36.8 % (ref 40–53)
HGB BLD-MCNC: 12.4 G/DL (ref 13.3–17.7)
IGG SERPL-MCNC: 420 MG/DL (ref 610–1616)
IMM GRANULOCYTES # BLD: 0 10E3/UL
IMM GRANULOCYTES NFR BLD: 1 %
LYMPHOCYTES # BLD AUTO: 0.4 10E3/UL (ref 0.8–5.3)
LYMPHOCYTES NFR BLD AUTO: 11 %
MCH RBC QN AUTO: 32.5 PG (ref 26.5–33)
MCHC RBC AUTO-ENTMCNC: 33.7 G/DL (ref 31.5–36.5)
MCV RBC AUTO: 96 FL (ref 78–100)
MONOCYTES # BLD AUTO: 0.3 10E3/UL (ref 0–1.3)
MONOCYTES NFR BLD AUTO: 9 %
NEUTROPHILS # BLD AUTO: 2.8 10E3/UL (ref 1.6–8.3)
NEUTROPHILS NFR BLD AUTO: 77 %
NRBC # BLD AUTO: 0 10E3/UL
NRBC BLD AUTO-RTO: 0 /100
PLATELET # BLD AUTO: 93 10E3/UL (ref 150–450)
POTASSIUM SERPL-SCNC: 4 MMOL/L (ref 3.4–5.3)
PROT SERPL-MCNC: 6.5 G/DL (ref 6.4–8.3)
RBC # BLD AUTO: 3.82 10E6/UL (ref 4.4–5.9)
SODIUM SERPL-SCNC: 139 MMOL/L (ref 136–145)
WBC # BLD AUTO: 3.5 10E3/UL (ref 4–11)

## 2023-03-01 PROCEDURE — 94640 AIRWAY INHALATION TREATMENT: CPT | Performed by: PHYSICIAN ASSISTANT

## 2023-03-01 PROCEDURE — 36591 DRAW BLOOD OFF VENOUS DEVICE: CPT

## 2023-03-01 PROCEDURE — 85004 AUTOMATED DIFF WBC COUNT: CPT

## 2023-03-01 PROCEDURE — 250N000011 HC RX IP 250 OP 636

## 2023-03-01 PROCEDURE — 80053 COMPREHEN METABOLIC PANEL: CPT

## 2023-03-01 PROCEDURE — 82784 ASSAY IGA/IGD/IGG/IGM EACH: CPT

## 2023-03-01 PROCEDURE — 94642 AEROSOL INHALATION TREATMENT: CPT | Performed by: PHYSICIAN ASSISTANT

## 2023-03-01 RX ORDER — DIPHENHYDRAMINE HCL 25 MG
50 CAPSULE ORAL ONCE
Status: CANCELLED | OUTPATIENT
Start: 2023-03-03

## 2023-03-01 RX ORDER — HEPARIN SODIUM,PORCINE 10 UNIT/ML
5 VIAL (ML) INTRAVENOUS
OUTPATIENT
Start: 2023-08-09

## 2023-03-01 RX ORDER — METHYLPREDNISOLONE SODIUM SUCCINATE 125 MG/2ML
125 INJECTION, POWDER, LYOPHILIZED, FOR SOLUTION INTRAMUSCULAR; INTRAVENOUS
Start: 2023-08-09

## 2023-03-01 RX ORDER — DIPHENHYDRAMINE HYDROCHLORIDE 50 MG/ML
50 INJECTION INTRAMUSCULAR; INTRAVENOUS
Status: CANCELLED
Start: 2023-03-03

## 2023-03-01 RX ORDER — DIPHENHYDRAMINE HYDROCHLORIDE 50 MG/ML
50 INJECTION INTRAMUSCULAR; INTRAVENOUS
Status: CANCELLED
Start: 2023-03-29

## 2023-03-01 RX ORDER — ACETAMINOPHEN 325 MG/1
650 TABLET ORAL ONCE
OUTPATIENT
Start: 2023-08-09

## 2023-03-01 RX ORDER — HEPARIN SODIUM (PORCINE) LOCK FLUSH IV SOLN 100 UNIT/ML 100 UNIT/ML
5 SOLUTION INTRAVENOUS
Status: CANCELLED | OUTPATIENT
Start: 2023-08-09

## 2023-03-01 RX ORDER — PENTAMIDINE ISETHIONATE 300 MG/300MG
300 INHALANT RESPIRATORY (INHALATION) ONCE
Status: COMPLETED | OUTPATIENT
Start: 2023-03-01 | End: 2023-03-01

## 2023-03-01 RX ORDER — EPINEPHRINE 1 MG/ML
0.3 INJECTION, SOLUTION, CONCENTRATE INTRAVENOUS EVERY 5 MIN PRN
Status: CANCELLED | OUTPATIENT
Start: 2023-03-29

## 2023-03-01 RX ORDER — ALBUTEROL SULFATE 0.83 MG/ML
2.5 SOLUTION RESPIRATORY (INHALATION)
Status: CANCELLED | OUTPATIENT
Start: 2023-03-03

## 2023-03-01 RX ORDER — DIPHENHYDRAMINE HCL 25 MG
50 CAPSULE ORAL ONCE
OUTPATIENT
Start: 2023-08-09

## 2023-03-01 RX ORDER — HEPARIN SODIUM (PORCINE) LOCK FLUSH IV SOLN 100 UNIT/ML 100 UNIT/ML
500 SOLUTION INTRAVENOUS ONCE
Status: COMPLETED | OUTPATIENT
Start: 2023-03-01 | End: 2023-03-01

## 2023-03-01 RX ORDER — NALOXONE HYDROCHLORIDE 0.4 MG/ML
0.2 INJECTION, SOLUTION INTRAMUSCULAR; INTRAVENOUS; SUBCUTANEOUS
Status: CANCELLED | OUTPATIENT
Start: 2023-03-03

## 2023-03-01 RX ORDER — METHYLPREDNISOLONE SODIUM SUCCINATE 125 MG/2ML
125 INJECTION, POWDER, LYOPHILIZED, FOR SOLUTION INTRAMUSCULAR; INTRAVENOUS
Status: CANCELLED
Start: 2023-03-29

## 2023-03-01 RX ORDER — MEPERIDINE HYDROCHLORIDE 25 MG/ML
25 INJECTION INTRAMUSCULAR; INTRAVENOUS; SUBCUTANEOUS EVERY 30 MIN PRN
OUTPATIENT
Start: 2023-08-09

## 2023-03-01 RX ORDER — EPINEPHRINE 1 MG/ML
0.3 INJECTION, SOLUTION INTRAMUSCULAR; SUBCUTANEOUS EVERY 5 MIN PRN
OUTPATIENT
Start: 2023-08-09

## 2023-03-01 RX ORDER — ALBUTEROL SULFATE 0.83 MG/ML
2.5 SOLUTION RESPIRATORY (INHALATION)
Status: CANCELLED | OUTPATIENT
Start: 2023-03-29

## 2023-03-01 RX ORDER — ALBUTEROL SULFATE 0.83 MG/ML
2.5 SOLUTION RESPIRATORY (INHALATION)
OUTPATIENT
Start: 2023-08-09

## 2023-03-01 RX ORDER — EPINEPHRINE 1 MG/ML
0.3 INJECTION, SOLUTION INTRAMUSCULAR; SUBCUTANEOUS EVERY 5 MIN PRN
Status: CANCELLED | OUTPATIENT
Start: 2023-03-03

## 2023-03-01 RX ORDER — ALBUTEROL SULFATE 0.83 MG/ML
2.5 SOLUTION RESPIRATORY (INHALATION) ONCE
Status: CANCELLED
Start: 2023-03-29 | End: 2023-03-29

## 2023-03-01 RX ORDER — HEPARIN SODIUM (PORCINE) LOCK FLUSH IV SOLN 100 UNIT/ML 100 UNIT/ML
5 SOLUTION INTRAVENOUS
Status: CANCELLED | OUTPATIENT
Start: 2023-03-03

## 2023-03-01 RX ORDER — ALBUTEROL SULFATE 90 UG/1
1-2 AEROSOL, METERED RESPIRATORY (INHALATION)
Start: 2023-08-09

## 2023-03-01 RX ORDER — ALBUTEROL SULFATE 90 UG/1
1-2 AEROSOL, METERED RESPIRATORY (INHALATION)
Status: CANCELLED
Start: 2023-03-03

## 2023-03-01 RX ORDER — PENTAMIDINE ISETHIONATE 300 MG/300MG
300 INHALANT RESPIRATORY (INHALATION) ONCE
Status: CANCELLED
Start: 2023-03-29 | End: 2023-03-29

## 2023-03-01 RX ORDER — ALBUTEROL SULFATE 90 UG/1
1-2 AEROSOL, METERED RESPIRATORY (INHALATION)
Status: CANCELLED
Start: 2023-03-29

## 2023-03-01 RX ORDER — METHYLPREDNISOLONE SODIUM SUCCINATE 125 MG/2ML
125 INJECTION, POWDER, LYOPHILIZED, FOR SOLUTION INTRAMUSCULAR; INTRAVENOUS
Status: CANCELLED
Start: 2023-03-03

## 2023-03-01 RX ORDER — NALOXONE HYDROCHLORIDE 0.4 MG/ML
0.2 INJECTION, SOLUTION INTRAMUSCULAR; INTRAVENOUS; SUBCUTANEOUS
OUTPATIENT
Start: 2023-08-09

## 2023-03-01 RX ORDER — DIPHENHYDRAMINE HYDROCHLORIDE 50 MG/ML
50 INJECTION INTRAMUSCULAR; INTRAVENOUS
Start: 2023-08-09

## 2023-03-01 RX ORDER — ACETAMINOPHEN 325 MG/1
650 TABLET ORAL ONCE
Status: CANCELLED | OUTPATIENT
Start: 2023-03-03

## 2023-03-01 RX ORDER — HEPARIN SODIUM,PORCINE 10 UNIT/ML
5 VIAL (ML) INTRAVENOUS
Status: CANCELLED | OUTPATIENT
Start: 2023-03-03

## 2023-03-01 RX ORDER — MEPERIDINE HYDROCHLORIDE 25 MG/ML
25 INJECTION INTRAMUSCULAR; INTRAVENOUS; SUBCUTANEOUS EVERY 30 MIN PRN
Status: CANCELLED | OUTPATIENT
Start: 2023-03-29

## 2023-03-01 RX ORDER — NALOXONE HYDROCHLORIDE 0.4 MG/ML
0.2 INJECTION, SOLUTION INTRAMUSCULAR; INTRAVENOUS; SUBCUTANEOUS
Status: CANCELLED | OUTPATIENT
Start: 2023-03-29

## 2023-03-01 RX ORDER — MEPERIDINE HYDROCHLORIDE 25 MG/ML
25 INJECTION INTRAMUSCULAR; INTRAVENOUS; SUBCUTANEOUS EVERY 30 MIN PRN
Status: CANCELLED | OUTPATIENT
Start: 2023-03-03

## 2023-03-01 RX ADMIN — Medication 500 UNITS: at 09:24

## 2023-03-01 RX ADMIN — PENTAMIDINE ISETHIONATE 300 MG: 300 INHALANT RESPIRATORY (INHALATION) at 09:43

## 2023-03-01 NOTE — PROGRESS NOTES
Juvenal LUCIEN Rioschristiano was seen today for a Pentamidine nebulizer tx ordered by Dr. Rosa Terry.    Patient was first given 4 puffs of albuterol MDI (due to severe albuterol nebulizer shortage), after which Pentamidine 300 mg (Lot # [id number]) mixed with 6cc Sterile H20 was administered through a filtered nebulizer.    Pre-treatment: SpO2 = 95%   HR = 74   BBS = clear  Post-treatment: SpO2 = 96%  HR = 81  BBS = clear    No adverse side effects noted by the patient.    This service today was provided under the supervision of Dr Sharon Myers, who was available if needed.     Procedure was completed by Sang Snow, JOSÉ.

## 2023-03-01 NOTE — NURSING NOTE
"Chief Complaint   Patient presents with     Port Draw     Labs drawn via port by RN in lab       Port accessed with 20 gauge 3/4\" gripper needle by RN, labs collected, line flushed with saline and heparin, then de-accessed.      Zaynab [place] RN      "

## 2023-03-02 ENCOUNTER — PATIENT OUTREACH (OUTPATIENT)
Dept: ONCOLOGY | Facility: CLINIC | Age: 58
End: 2023-03-02
Payer: COMMERCIAL

## 2023-03-02 NOTE — PROGRESS NOTES
[facility]: Cancer Care Short Note                                    Discussion with Patient:                                                        OUTBOUND CALL: RNCC called patient. RNCC introduced self and reason for call. Informed patient that IGG was 420 on 3/1. Will plan for IVIG tomorrow. Per care plan, auth has been obtained for IVIG.   Reviewed upcoming appts.     Future Appointments   Date Time Provider Department Center   3/3/2023 10:30 AM  BMT INFUSION NURSE [facility]   3/29/2023 10:00 AM  PFL PENT [facility]   4/26/2023  9:30 AM  [facility]   4/26/2023 10:00 AM  PFL PENT [facility]   4/26/2023 11:00 AM Tasia Johnson PA-C ONA [facility]   5/24/2023 10:00 AM  PFL PENT [facility]     Pt verbalizes understanding and has no other questions, comments, or concerns at this time.     Suri Landa, RN, MSN, OCN   RN Care Coordinator   [facility]   [address] [place] [id number]   [phone]

## 2023-03-03 ENCOUNTER — ALLIED HEALTH/NURSE VISIT (OUTPATIENT)
Dept: TRANSPLANT | Facility: CLINIC | Age: 58
End: 2023-03-03

## 2023-03-03 ENCOUNTER — INFUSION THERAPY VISIT (OUTPATIENT)
Dept: TRANSPLANT | Facility: CLINIC | Age: 58
End: 2023-03-03
Payer: COMMERCIAL

## 2023-03-03 VITALS
OXYGEN SATURATION: 100 % | WEIGHT: 231.7 LBS | DIASTOLIC BLOOD PRESSURE: 81 MMHG | HEART RATE: 58 BPM | RESPIRATION RATE: 16 BRPM | TEMPERATURE: 98.6 F | BODY MASS INDEX: 35.73 KG/M2 | SYSTOLIC BLOOD PRESSURE: 135 MMHG

## 2023-03-03 DIAGNOSIS — D80.1 HYPOGAMMAGLOBULINEMIA (H): Primary | ICD-10-CM

## 2023-03-03 DIAGNOSIS — Z00.6 EXAMINATION OF PARTICIPANT OR CONTROL IN CLINICAL RESEARCH: Primary | ICD-10-CM

## 2023-03-03 PROCEDURE — 250N000013 HC RX MED GY IP 250 OP 250 PS 637

## 2023-03-03 PROCEDURE — 250N000011 HC RX IP 250 OP 636

## 2023-03-03 PROCEDURE — 96365 THER/PROPH/DIAG IV INF INIT: CPT

## 2023-03-03 PROCEDURE — 96366 THER/PROPH/DIAG IV INF ADDON: CPT

## 2023-03-03 RX ORDER — ACETAMINOPHEN 325 MG/1
650 TABLET ORAL ONCE
Status: COMPLETED | OUTPATIENT
Start: 2023-03-03 | End: 2023-03-03

## 2023-03-03 RX ORDER — DIPHENHYDRAMINE HCL 25 MG
50 CAPSULE ORAL ONCE
Status: COMPLETED | OUTPATIENT
Start: 2023-03-03 | End: 2023-03-03

## 2023-03-03 RX ADMIN — ACETAMINOPHEN 650 MG: 325 TABLET ORAL at 10:49

## 2023-03-03 RX ADMIN — DIPHENHYDRAMINE HYDROCHLORIDE 50 MG: 25 CAPSULE ORAL at 10:49

## 2023-03-03 RX ADMIN — HUMAN IMMUNOGLOBULIN G 35 G: 20 LIQUID INTRAVENOUS at 11:33

## 2023-03-03 ASSESSMENT — PAIN SCALES - GENERAL: PAINLEVEL: SEVERE PAIN (7)

## 2023-03-03 NOTE — PROGRESS NOTES
Infusion Nursing Note:  Juvenal Blake presents today for scheduled infusion.    Patient seen by provider today: No   present during visit today: Not Applicable.    Note: Patient arrived for scheduled IVIG. Reports left shoulder pain rated 7/10, due to previous injury. Occasionally feels nauseated, symptoms managed with home PO regimen. Per patient, Dr. Terry aware of ongoing pain/nausea . Denies chest pain, SOB, weakness, fatigue, fever. ONC toxicity assessment completed. Home medication and allergies reviewed.     Premedicated with Tylenol 650 mg PO and Benadryl 50 mg PO. Received IVIG as ordered without complication. Max rate of 3.5mL/kg/hr.    Intravenous Access:  Peripheral IV placed.    Treatment Conditions:  Not Applicable.    Post Infusion Assessment:  Patient tolerated infusion without incident.  Blood return noted pre and post infusion.  Site patent and intact, free from redness, edema or discomfort.  No evidence of extravasations.  Access discontinued per protocol.     Discharge Plan:   AVS to patient via MYCHART.   Patient discharged in stable condition accompanied by: self.  Departure Mode: Ambulatory.      Gloria Holbrook RN

## 2023-03-03 NOTE — LETTER
3/3/2023         RE: Juvenal Blake  [address] [place] [address]        Dear Colleague,    Thank you for referring your patient, Juvenal Blake, to the [facility] BLOOD AND MARROW TRANSPLANT PROGRAM [place]. Please see a copy of my visit note below.    Infusion Nursing Note:  Juvenal Blake presents today for scheduled infusion.    Patient seen by provider today: No   present during visit today: Not Applicable.    Note: Patient arrived for scheduled IVIG. Reports left shoulder pain rated 7/10, due to previous injury. Occasionally feels nauseated, symptoms managed with home PO regimen. Per patient, Dr. Terry aware of ongoing pain/nausea . Denies chest pain, SOB, weakness, fatigue, fever. ONC toxicity assessment completed. Home medication and allergies reviewed.     Premedicated with Tylenol 650 mg PO and Benadryl 50 mg PO. Received IVIG as ordered without complication. Max rate of 3.5mL/kg/hr.    Intravenous Access:  Peripheral IV placed.    Treatment Conditions:  Not Applicable.    Post Infusion Assessment:  Patient tolerated infusion without incident.  Blood return noted pre and post infusion.  Site patent and intact, free from redness, edema or discomfort.  No evidence of extravasations.  Access discontinued per protocol.     Discharge Plan:   AVS to patient via MYCHART.   Patient discharged in stable condition accompanied by: self.  Departure Mode: Ambulatory.      Gloria Holbrook RN                          Again, thank you for allowing me to participate in the care of your patient.        Sincerely,        BMT Infusion Nurse

## 2023-03-03 NOTE — PROGRESS NOTES
"[id number]: Re-Consent Note     New information about xenotransplantation risks have been added to the consent form. This was explained to the patient.The patient verbalized understanding and would like to continue participation in the trial. The patient was provided with a signed copy of the IRB-approved consent form.     With regard to the new ICF language (\"...you and  your family members and intimate contacts should not donate blood, sperm, or other body  fluids or tissues\") the patient asked whether or not his wife should refrain from giving blood. Per Dr. Terry, she should refrain as directed in the ICF. I left a message on the patient's VM with this information.     Consent Version Date: 18Nov2022  Consent obtained by: Tanya Singh RN     Date: 03MAR2023    Tanya Singh RN      Form 503.00.02 (Version 1)     Effective date: 01JUL2018     Next Review Date: 01JUL2020    "

## 2023-03-03 NOTE — LETTER
Date:March 6, 2023      Provider requested that no letter be sent. Do not send.       [facility]

## 2023-03-10 DIAGNOSIS — D80.1 HYPOGAMMAGLOBULINEMIA (H): Primary | ICD-10-CM

## 2023-03-10 NOTE — PROGRESS NOTES
Plan re IgG replacement:    We antionette recheck IgG again in early APril and replace via home health infusion for IgG levels below 300mg/dl.  We should check IgG monthly x 3 months with similar threshold for replacement.    Rosa Terry MD

## 2023-03-10 NOTE — PROGRESS NOTES
Per Dr. Terry:  Check IgG level 3/29 (when he is here for pentamidine; appointment requested 3/10)  If < 300 mg/dL, replace IVIG via home infusion (insurance no longer covering infusion at clinic)  Check monthly IgG x 3 months and replete if < 300.    Penny Garcia, PA-C  3/10/2023

## 2023-03-29 ENCOUNTER — LAB (OUTPATIENT)
Dept: LAB | Facility: CLINIC | Age: 58
End: 2023-03-29
Payer: COMMERCIAL

## 2023-03-29 DIAGNOSIS — C83.398 DIFFUSE LARGE B-CELL LYMPHOMA OF EXTRANODAL SITE EXCLUDING SPLEEN AND OTHER SOLID ORGANS: Primary | ICD-10-CM

## 2023-03-29 DIAGNOSIS — C82.08 FOLLICULAR LYMPHOMA GRADE I, LYMPH NODES OF MULTIPLE SITES (H): ICD-10-CM

## 2023-03-29 DIAGNOSIS — D80.1 HYPOGAMMAGLOBULINEMIA (H): ICD-10-CM

## 2023-03-29 PROCEDURE — 250N000011 HC RX IP 250 OP 636

## 2023-03-29 PROCEDURE — 94642 AEROSOL INHALATION TREATMENT: CPT | Performed by: INTERNAL MEDICINE

## 2023-03-29 PROCEDURE — 36591 DRAW BLOOD OFF VENOUS DEVICE: CPT

## 2023-03-29 PROCEDURE — 82784 ASSAY IGA/IGD/IGG/IGM EACH: CPT

## 2023-03-29 PROCEDURE — 94640 AIRWAY INHALATION TREATMENT: CPT | Performed by: INTERNAL MEDICINE

## 2023-03-29 RX ORDER — HEPARIN SODIUM (PORCINE) LOCK FLUSH IV SOLN 100 UNIT/ML 100 UNIT/ML
5 SOLUTION INTRAVENOUS ONCE
Status: COMPLETED | OUTPATIENT
Start: 2023-03-29 | End: 2023-03-29

## 2023-03-29 RX ORDER — ALBUTEROL SULFATE 0.83 MG/ML
2.5 SOLUTION RESPIRATORY (INHALATION) ONCE
Status: CANCELLED
Start: 2023-04-26 | End: 2023-04-26

## 2023-03-29 RX ORDER — DIPHENHYDRAMINE HYDROCHLORIDE 50 MG/ML
50 INJECTION INTRAMUSCULAR; INTRAVENOUS
Status: CANCELLED
Start: 2023-04-26

## 2023-03-29 RX ORDER — PENTAMIDINE ISETHIONATE 300 MG/300MG
300 INHALANT RESPIRATORY (INHALATION) ONCE
Status: COMPLETED | OUTPATIENT
Start: 2023-03-29 | End: 2023-03-29

## 2023-03-29 RX ORDER — EPINEPHRINE 1 MG/ML
0.3 INJECTION, SOLUTION, CONCENTRATE INTRAVENOUS EVERY 5 MIN PRN
Status: CANCELLED | OUTPATIENT
Start: 2023-04-26

## 2023-03-29 RX ORDER — ALBUTEROL SULFATE 0.83 MG/ML
2.5 SOLUTION RESPIRATORY (INHALATION)
Status: CANCELLED | OUTPATIENT
Start: 2023-04-26

## 2023-03-29 RX ORDER — METHYLPREDNISOLONE SODIUM SUCCINATE 125 MG/2ML
125 INJECTION, POWDER, LYOPHILIZED, FOR SOLUTION INTRAMUSCULAR; INTRAVENOUS
Status: CANCELLED
Start: 2023-04-26

## 2023-03-29 RX ORDER — NALOXONE HYDROCHLORIDE 0.4 MG/ML
0.2 INJECTION, SOLUTION INTRAMUSCULAR; INTRAVENOUS; SUBCUTANEOUS
Status: CANCELLED | OUTPATIENT
Start: 2023-04-26

## 2023-03-29 RX ORDER — ALBUTEROL SULFATE 90 UG/1
1-2 AEROSOL, METERED RESPIRATORY (INHALATION)
Status: CANCELLED
Start: 2023-04-26

## 2023-03-29 RX ORDER — PENTAMIDINE ISETHIONATE 300 MG/300MG
300 INHALANT RESPIRATORY (INHALATION) ONCE
Status: CANCELLED
Start: 2023-04-26 | End: 2023-04-26

## 2023-03-29 RX ORDER — MEPERIDINE HYDROCHLORIDE 25 MG/ML
25 INJECTION INTRAMUSCULAR; INTRAVENOUS; SUBCUTANEOUS EVERY 30 MIN PRN
Status: CANCELLED | OUTPATIENT
Start: 2023-04-26

## 2023-03-29 RX ADMIN — PENTAMIDINE ISETHIONATE 300 MG: 300 INHALANT RESPIRATORY (INHALATION) at 10:10

## 2023-03-29 RX ADMIN — Medication 5 ML: at 11:26

## 2023-03-29 NOTE — NURSING NOTE
Chief Complaint   Patient presents with     Blood Draw     Port accessed with 20g gripper needle by RN, labs collected, line flushed with saline and heparin.       Caro DIMAS RN PHN BSN  BMT/Oncology Lab

## 2023-03-29 NOTE — PROGRESS NOTES
Patient was seen today for a Pentamidine nebulizer tx ordered by .    Patient was first given 4 PUFFS ALBUTEROL MDI, after which Pentamidine 300 mg (Lot # [id number]) mixed with 6cc Sterile H20 was administered through a filtered nebulizer.    Pre-treatment: SpO2 = 98%   HR = 72   BBS = CLEAR  Post-treatment: SpO2 = 98%  HR = 72 BBS =CLEAR    No adverse side effects noted by the patient.    This service today was provided under the direct supervision of Dr. PHILLIPS, who was available if needed.     Procedure was completed by Zhane Younger.

## 2023-03-30 LAB — IGG SERPL-MCNC: 505 MG/DL (ref 610–1616)

## 2023-04-12 ENCOUNTER — LAB REQUISITION (OUTPATIENT)
Dept: LAB | Facility: CLINIC | Age: 58
End: 2023-04-12

## 2023-04-12 DIAGNOSIS — Z85.46 PERSONAL HISTORY OF MALIGNANT NEOPLASM OF PROSTATE: ICD-10-CM

## 2023-04-12 LAB — PSA SERPL DL<=0.01 NG/ML-MCNC: 1.95 NG/ML (ref 0–3.5)

## 2023-04-12 PROCEDURE — 84153 ASSAY OF PSA TOTAL: CPT | Performed by: FAMILY MEDICINE

## 2023-04-24 RX ORDER — DIPHENHYDRAMINE HYDROCHLORIDE 50 MG/ML
50 INJECTION INTRAMUSCULAR; INTRAVENOUS
Status: CANCELLED
Start: 2023-04-24

## 2023-04-24 RX ORDER — ALBUTEROL SULFATE 0.83 MG/ML
2.5 SOLUTION RESPIRATORY (INHALATION) ONCE
Status: CANCELLED
Start: 2023-04-24 | End: 2023-04-24

## 2023-04-24 RX ORDER — ALBUTEROL SULFATE 90 UG/1
1-2 AEROSOL, METERED RESPIRATORY (INHALATION)
Status: CANCELLED
Start: 2023-04-24

## 2023-04-24 RX ORDER — PENTAMIDINE ISETHIONATE 300 MG/300MG
300 INHALANT RESPIRATORY (INHALATION) ONCE
Status: CANCELLED
Start: 2023-04-24 | End: 2023-04-24

## 2023-04-24 RX ORDER — METHYLPREDNISOLONE SODIUM SUCCINATE 125 MG/2ML
125 INJECTION, POWDER, LYOPHILIZED, FOR SOLUTION INTRAMUSCULAR; INTRAVENOUS
Status: CANCELLED
Start: 2023-04-24

## 2023-04-24 RX ORDER — EPINEPHRINE 1 MG/ML
0.3 INJECTION, SOLUTION INTRAMUSCULAR; SUBCUTANEOUS EVERY 5 MIN PRN
Status: CANCELLED | OUTPATIENT
Start: 2023-04-24

## 2023-04-24 RX ORDER — ALBUTEROL SULFATE 0.83 MG/ML
2.5 SOLUTION RESPIRATORY (INHALATION)
Status: CANCELLED | OUTPATIENT
Start: 2023-04-24

## 2023-04-24 RX ORDER — MEPERIDINE HYDROCHLORIDE 25 MG/ML
25 INJECTION INTRAMUSCULAR; INTRAVENOUS; SUBCUTANEOUS EVERY 30 MIN PRN
Status: CANCELLED | OUTPATIENT
Start: 2023-04-24

## 2023-05-01 NOTE — PROGRESS NOTES
Oncology/Hematology Visit Note  May 2, 2023    Reason for Visit: follow up of follicular lymphoma    History of Present Illness: Juvenal Blake is a 57 year old male with a history of follicular lymphoma. He relapsed after BR, OCHOP, NAM NK (9/1/20), idelalisib (~4-6/2020),  (6/28/21), polatuzumab (12/21/21) (hx Rituxan reaction).  Now s/p YESCARTA on 1/17/22.  Please see previous notes for further details on the patient's history. He comes in today for routine follow up.    Interval History:  Patient reports that he is now dealing with prostate cancer and will be having scan on May 10.  He continues to be retired in which he most recently worked as a .  He reports occasional headaches managed with Tylenol.  He has noticed some more floaters with his vision.  His last eye exam was a year or 2 ago.  He has dyspnea on exertion that is unchanged.  He has some abdominal pain if he hits potholes while driving or is doing more intense physical work.  This pain can be sharp at times and sometimes is associated with nausea.  He denies any vomiting.  He denies any current abdominal pain.  He is keeping busy with doing yard work and work around the house.  He denies any heartburn and continues on his omeprazole.  He denies any bleeding or night sweats.  He reports that his appetite has decreased after receiving a weight loss injection.  He is drinking about 3 to 4 glasses of fluids per day.  He denies other concerns.    Review of Systems:  Patient denies any of the following except if noted above: fevers, chills, difficulty with energy, hearing changes, chest pain, dyspnea, vomiting, diarrhea, constipation, urinary concerns, numbness, tingling, issues with sleep or mood.     Current Outpatient Medications   Medication Sig Dispense Refill     acyclovir (ZOVIRAX) 800 MG tablet Take 1 tablet (800 mg) by mouth every 12 hours 180 tablet 1     albuterol (PROAIR HFA/PROVENTIL HFA/VENTOLIN HFA) 108 (90 Base) MCG/ACT  inhaler Inhale 2 puffs into the lungs every 6 hours       beclomethasone HFA (QVAR REDIHALER) 80 MCG/ACT inhaler INHALE 1 PUFF BY MOUTH TWICE DAILY WHEN FOR SICK       benzonatate (TESSALON) 100 MG capsule        Calcium Polycarbophil (FIBER-CAPS PO) Take 2 capsules by mouth daily       fluticasone (FLONASE) 50 MCG/ACT nasal spray USE 2 SPRAYS PER NOSTRIL ONCE A DAY FOR NASAL CONGESTION. 14 DAYS       guaiFENesin (ROBITUSSIN) 100 MG/5ML SYRP Take 10 mLs by mouth every 4 hours as needed for cough 118 mL 0     loratadine (CLARITIN) 10 MG tablet TAKE 1 TABLET BY MOUTH EVERY DAY FOR ALLERGIES       medical cannabis (Patient's own supply) 1 Dose daily as needed (The purpose of this order is to document that the patient reports taking medical cannabis)       multivitamin w/minerals (THERA-VIT-M) tablet Take 1 tablet by mouth daily       omeprazole (PRILOSEC) 20 MG DR capsule TAKE 2 CAPSULES(40 MG) BY MOUTH DAILY 180 capsule 3     Semaglutide-Weight Management (WEGOVY) 0.25 MG/0.5ML pen Inject 0.25 mg Subcutaneous once a week Started 3 weeks ago. Prescribed by [facility]       sulfamethoxazole-trimethoprim (BACTRIM DS) 800-160 MG tablet Take 1 tablet by mouth 2 times daily On Mo and Tue only 16 tablet 5     tolterodine ER (DETROL LA) 4 MG 24 hr capsule Take 1 capsule (4 mg) by mouth daily 90 capsule 11     Physical Examination:  General: The patient is a pleasant male in no acute distress.  /67 (BP Location: Left arm)   Pulse 65   Temp 99  F (37.2  C) (Oral)   Resp 18   Wt 105.5 kg (232 lb 9.6 oz)   SpO2 97%   BMI 35.87 kg/m    Wt Readings from Last 10 Encounters:   05/02/23 105.5 kg (232 lb 9.6 oz)   03/03/23 105.1 kg (231 lb 11.2 oz)   02/01/23 105.6 kg (232 lb 12.8 oz)   01/19/23 104.1 kg (229 lb 9.6 oz)   10/19/22 104.6 kg (230 lb 11.2 oz)   08/05/22 105.6 kg (232 lb 12.9 oz)   04/18/22 111.1 kg (245 lb)   03/17/22 109.4 kg (241 lb 1.6 oz)   02/17/22 107.5 kg (237 lb 1.6 oz)   02/14/22 106.4 kg (234 lb 8 oz)      HEENT: EOMI. Sclerae are anicteric.   Lymph: Neck is supple with no lymphadenopathy in the cervical or supraclavicular areas. No palpable adenopathy in the axillary or inguinal areas bilaterally.   Heart: Regular rate and rhythm.   Lungs: Clear to auscultation bilaterally.   Abdomen: Bowel sounds present, soft, mild central abdominal tenderness, remainder nontender with no palpable hepatosplenomegaly or masses.   Extremities: Trace sockline lower extremity edema noted bilaterally.   Neuro: Cranial nerves II through XII are grossly intact.  Skin: No rashes, petechiae, or bruising noted on exposed skin.    Laboratory Data:  Most Recent 3 CBC's:  Recent Labs   Lab Test 05/02/23  1104 03/01/23  0921 01/18/23  1544   WBC 4.5 3.5* 6.2   HGB 11.8* 12.4* 11.5*   MCV 99 96 99   PLT 91* 93* 113*   ANEUTAUTO 3.2 2.8 5.1    Most Recent 3 BMP's:  Recent Labs   Lab Test 05/02/23  1104 03/01/23  0921 01/18/23  1544    139 140   POTASSIUM 4.1 4.0 3.8   CHLORIDE 104 104 104   CO2 27 24 25   BUN 12.6 15.5 11.0   CR 1.28* 1.27* 1.06   ANIONGAP 8 11 11   TRE 9.2 9.4 9.3   * 151* 127*   PROTTOTAL 6.0* 6.5 6.3*   ALBUMIN 4.2 4.4 4.3    Most Recent 2 LFT's:  Recent Labs   Lab Test 05/02/23  1104 03/01/23  0921   AST 22 25   ALT 27 27   ALKPHOS 72 76   BILITOTAL 0.4 0.3   I reviewed the above labs today.    Assessment and Plan:  Follicular lymphoma, s/p treatment with YESCARTA in January 2022. He remains in remission and is doing well today. His labs are generally stable. He will follow-up with Dr. Terry in 3 months with restaging imaging. He will call sooner for concerns.     Anemia and thrombocytopenia. Chronic and stable. Suspect related to prior chemotherapy.      Hypogammaglobulinemia. Patient has received IVIG, last in March 2023. IgG level from today is pending. Will give additional IVIG if IgG level is less than 300.    Prophylaxis. He will continue on acyclovir 400 mg bid. He also also been receiving monthly  pentamidine, dose 4 of 6 was dose. Will recheck his CD4 level today. Last level was still low at 126.     Acid reflux. Managed with omeprazole, which he will continue.      Renal insufficiency. Noted on the last 2 checks. Recommend pushing fluids with a goal of at least 64 oz/day.     History of stable angina and NSTEMI type II.  Evaluated by cardiology prior to transplant, CTA coronary artery and echo completed.  Carotid artery plaque - we previously asked to follow-up with PCP.    Prostate cancer. S/p radical prostatectomy and bilateral lymph node dissection 11/2017. Completed radiation to the prostate fossa and pelvic lymph nodes at [facility] early May 2018. He received 4500 cGy in 25 fractions. He then had 2340 cGy boost in 13 fractions to the prostatic fossa, for a total dose of 6240 cGy in 38 treatment fractions delivered over 54 days. He did not receive hormonal therapy. Last PSA was normal at 1.95 on 4/12/23. He reports that he is getting imaging related to his prostate on 5/10.     Health care maintenance.  Eye exams: Recommend exams at least every 2 years. Recommend scheduling.  Dental exams: Recommend exams and cleanings every 6 months. Scheduled for June.  Primary care: Recommend follow up at least annually. Up to date  Skin care: Recommend the use of sunscreen with SPF of at least 30, reapplying every 2 hours with prolonged sun exposure.    Tobacco use: Recommend abstaining. Denies use.  Alcohol use: Recommend no more than 2/day for men. Has 2/day on weekdays and about 4-5 on the weekends. Encouraged cutting back.   Physical activity: Recommend regular activity, ideally 150 minutes/week of moderate intensity activity. Goes for some walks and has a treadmill.     Irma Onofre PA-C  [facility]  [address] [place] [address] [phone]    37 minutes spent on the date of the encounter doing chart review, review of test results, interpretation of tests, patient visit and  documentation

## 2023-05-02 ENCOUNTER — APPOINTMENT (OUTPATIENT)
Dept: LAB | Facility: CLINIC | Age: 58
End: 2023-05-02
Payer: COMMERCIAL

## 2023-05-02 ENCOUNTER — ONCOLOGY VISIT (OUTPATIENT)
Dept: ONCOLOGY | Facility: CLINIC | Age: 58
End: 2023-05-02
Payer: COMMERCIAL

## 2023-05-02 VITALS
RESPIRATION RATE: 18 BRPM | DIASTOLIC BLOOD PRESSURE: 67 MMHG | WEIGHT: 232.6 LBS | SYSTOLIC BLOOD PRESSURE: 116 MMHG | OXYGEN SATURATION: 97 % | HEART RATE: 65 BPM | BODY MASS INDEX: 35.87 KG/M2 | TEMPERATURE: 99 F

## 2023-05-02 DIAGNOSIS — D72.818 LOW CD4 CELL COUNT DETERMINED BY FLOW CYTOMETRY: ICD-10-CM

## 2023-05-02 DIAGNOSIS — C82.00 FOLLICULAR LYMPHOMA GRADE I, UNSPECIFIED BODY REGION (H): ICD-10-CM

## 2023-05-02 DIAGNOSIS — C82.08 FOLLICULAR LYMPHOMA GRADE I, LYMPH NODES OF MULTIPLE SITES (H): Primary | ICD-10-CM

## 2023-05-02 DIAGNOSIS — C83.398 DIFFUSE LARGE B-CELL LYMPHOMA OF EXTRANODAL SITE EXCLUDING SPLEEN AND OTHER SOLID ORGANS: Primary | ICD-10-CM

## 2023-05-02 DIAGNOSIS — Z85.72 HISTORY OF LYMPHOMA: ICD-10-CM

## 2023-05-02 LAB
ALBUMIN SERPL BCG-MCNC: 4.2 G/DL (ref 3.5–5.2)
ALP SERPL-CCNC: 72 U/L (ref 40–129)
ALT SERPL W P-5'-P-CCNC: 27 U/L (ref 10–50)
ANION GAP SERPL CALCULATED.3IONS-SCNC: 8 MMOL/L (ref 7–15)
AST SERPL W P-5'-P-CCNC: 22 U/L (ref 10–50)
BASOPHILS # BLD AUTO: 0 10E3/UL (ref 0–0.2)
BASOPHILS NFR BLD AUTO: 1 %
BILIRUB SERPL-MCNC: 0.4 MG/DL
BUN SERPL-MCNC: 12.6 MG/DL (ref 8–23)
CALCIUM SERPL-MCNC: 9.2 MG/DL (ref 8.6–10)
CD3 CELLS # BLD: 273 CELLS/UL (ref 603–2990)
CD3 CELLS NFR BLD: 33 % (ref 49–84)
CD3+CD4+ CELLS # BLD: 142 CELLS/UL (ref 441–2156)
CD3+CD4+ CELLS NFR BLD: 17 % (ref 28–63)
CD3+CD4+ CELLS/CD3+CD8+ CLL BLD: 1.12 % (ref 1.4–2.6)
CD3+CD8+ CELLS # BLD: 126 CELLS/UL (ref 125–1312)
CD3+CD8+ CELLS NFR BLD: 15 % (ref 10–40)
CHLORIDE SERPL-SCNC: 104 MMOL/L (ref 98–107)
CREAT SERPL-MCNC: 1.28 MG/DL (ref 0.67–1.17)
DEPRECATED HCO3 PLAS-SCNC: 27 MMOL/L (ref 22–29)
EOSINOPHIL # BLD AUTO: 0.1 10E3/UL (ref 0–0.7)
EOSINOPHIL NFR BLD AUTO: 2 %
ERYTHROCYTE [DISTWIDTH] IN BLOOD BY AUTOMATED COUNT: 15.9 % (ref 10–15)
GFR SERPL CREATININE-BSD FRML MDRD: 64 ML/MIN/1.73M2
GLUCOSE SERPL-MCNC: 135 MG/DL (ref 70–99)
HCT VFR BLD AUTO: 34.2 % (ref 40–53)
HGB BLD-MCNC: 11.8 G/DL (ref 13.3–17.7)
IMM GRANULOCYTES # BLD: 0 10E3/UL
IMM GRANULOCYTES NFR BLD: 1 %
LDH SERPL L TO P-CCNC: 173 U/L (ref 0–250)
LYMPHOCYTES # BLD AUTO: 0.8 10E3/UL (ref 0.8–5.3)
LYMPHOCYTES NFR BLD AUTO: 17 %
MCH RBC QN AUTO: 34.2 PG (ref 26.5–33)
MCHC RBC AUTO-ENTMCNC: 34.5 G/DL (ref 31.5–36.5)
MCV RBC AUTO: 99 FL (ref 78–100)
MONOCYTES # BLD AUTO: 0.4 10E3/UL (ref 0–1.3)
MONOCYTES NFR BLD AUTO: 8 %
NEUTROPHILS # BLD AUTO: 3.2 10E3/UL (ref 1.6–8.3)
NEUTROPHILS NFR BLD AUTO: 71 %
NRBC # BLD AUTO: 0 10E3/UL
NRBC BLD AUTO-RTO: 0 /100
PLATELET # BLD AUTO: 91 10E3/UL (ref 150–450)
POTASSIUM SERPL-SCNC: 4.1 MMOL/L (ref 3.4–5.3)
PROT SERPL-MCNC: 6 G/DL (ref 6.4–8.3)
RBC # BLD AUTO: 3.45 10E6/UL (ref 4.4–5.9)
SODIUM SERPL-SCNC: 139 MMOL/L (ref 136–145)
T CELL COMMENT: ABNORMAL
WBC # BLD AUTO: 4.5 10E3/UL (ref 4–11)

## 2023-05-02 PROCEDURE — 83615 LACTATE (LD) (LDH) ENZYME: CPT | Performed by: PHYSICIAN ASSISTANT

## 2023-05-02 PROCEDURE — 85025 COMPLETE CBC W/AUTO DIFF WBC: CPT | Performed by: PHYSICIAN ASSISTANT

## 2023-05-02 PROCEDURE — 86360 T CELL ABSOLUTE COUNT/RATIO: CPT | Performed by: PHYSICIAN ASSISTANT

## 2023-05-02 PROCEDURE — 82784 ASSAY IGA/IGD/IGG/IGM EACH: CPT | Performed by: PHYSICIAN ASSISTANT

## 2023-05-02 PROCEDURE — 94642 AEROSOL INHALATION TREATMENT: CPT | Performed by: INTERNAL MEDICINE

## 2023-05-02 PROCEDURE — 99214 OFFICE O/P EST MOD 30 MIN: CPT | Performed by: PHYSICIAN ASSISTANT

## 2023-05-02 PROCEDURE — G0463 HOSPITAL OUTPT CLINIC VISIT: HCPCS | Performed by: PHYSICIAN ASSISTANT

## 2023-05-02 PROCEDURE — 80053 COMPREHEN METABOLIC PANEL: CPT | Performed by: PHYSICIAN ASSISTANT

## 2023-05-02 PROCEDURE — 94640 AIRWAY INHALATION TREATMENT: CPT | Performed by: INTERNAL MEDICINE

## 2023-05-02 PROCEDURE — 36591 DRAW BLOOD OFF VENOUS DEVICE: CPT | Performed by: PHYSICIAN ASSISTANT

## 2023-05-02 PROCEDURE — 250N000011 HC RX IP 250 OP 636

## 2023-05-02 RX ORDER — DIPHENHYDRAMINE HYDROCHLORIDE 50 MG/ML
50 INJECTION INTRAMUSCULAR; INTRAVENOUS
Status: CANCELLED
Start: 2023-05-30

## 2023-05-02 RX ORDER — ALBUTEROL SULFATE 0.83 MG/ML
2.5 SOLUTION RESPIRATORY (INHALATION)
Status: CANCELLED | OUTPATIENT
Start: 2023-05-30

## 2023-05-02 RX ORDER — HEPARIN SODIUM (PORCINE) LOCK FLUSH IV SOLN 100 UNIT/ML 100 UNIT/ML
5 SOLUTION INTRAVENOUS
Status: CANCELLED | OUTPATIENT
Start: 2023-05-02

## 2023-05-02 RX ORDER — METHYLPREDNISOLONE SODIUM SUCCINATE 125 MG/2ML
125 INJECTION, POWDER, LYOPHILIZED, FOR SOLUTION INTRAMUSCULAR; INTRAVENOUS
Status: CANCELLED
Start: 2023-05-30

## 2023-05-02 RX ORDER — HEPARIN SODIUM,PORCINE 10 UNIT/ML
5 VIAL (ML) INTRAVENOUS
Status: CANCELLED | OUTPATIENT
Start: 2023-05-02

## 2023-05-02 RX ORDER — EPINEPHRINE 1 MG/ML
0.3 INJECTION, SOLUTION, CONCENTRATE INTRAVENOUS EVERY 5 MIN PRN
Status: CANCELLED | OUTPATIENT
Start: 2023-05-30

## 2023-05-02 RX ORDER — HEPARIN SODIUM (PORCINE) LOCK FLUSH IV SOLN 100 UNIT/ML 100 UNIT/ML
5 SOLUTION INTRAVENOUS
Status: DISCONTINUED | OUTPATIENT
Start: 2023-05-02 | End: 2023-05-02 | Stop reason: HOSPADM

## 2023-05-02 RX ORDER — PENTAMIDINE ISETHIONATE 300 MG/300MG
300 INHALANT RESPIRATORY (INHALATION) ONCE
Status: CANCELLED
Start: 2023-05-30 | End: 2023-05-30

## 2023-05-02 RX ORDER — PENTAMIDINE ISETHIONATE 300 MG/300MG
300 INHALANT RESPIRATORY (INHALATION) ONCE
Status: COMPLETED | OUTPATIENT
Start: 2023-05-02 | End: 2023-05-02

## 2023-05-02 RX ORDER — MEPERIDINE HYDROCHLORIDE 25 MG/ML
25 INJECTION INTRAMUSCULAR; INTRAVENOUS; SUBCUTANEOUS EVERY 30 MIN PRN
Status: CANCELLED | OUTPATIENT
Start: 2023-05-30

## 2023-05-02 RX ORDER — ALBUTEROL SULFATE 0.83 MG/ML
2.5 SOLUTION RESPIRATORY (INHALATION) ONCE
Status: CANCELLED
Start: 2023-05-30 | End: 2023-05-30

## 2023-05-02 RX ORDER — ALBUTEROL SULFATE 90 UG/1
1-2 AEROSOL, METERED RESPIRATORY (INHALATION)
Status: CANCELLED
Start: 2023-05-30

## 2023-05-02 RX ADMIN — PENTAMIDINE ISETHIONATE 300 MG: 300 INHALANT RESPIRATORY (INHALATION) at 11:30

## 2023-05-02 RX ADMIN — Medication 5 ML: at 11:07

## 2023-05-02 ASSESSMENT — PAIN SCALES - GENERAL: PAINLEVEL: MODERATE PAIN (5)

## 2023-05-02 NOTE — LETTER
5/2/2023         RE: Juvenal Blake  [address] [place] [address]        Dear Colleague,    Thank you for referring your patient, Juvenal Blake, to the [facility]. Please see a copy of my visit note below.    Oncology/Hematology Visit Note  May 2, 2023    Reason for Visit: follow up of follicular lymphoma    History of Present Illness: Juvenal Blake is a 57 year old male with a history of follicular lymphoma. He relapsed after BR, OCHOP, NAM NK (9/1/20), idelalisib (~4-6/2020),  (6/28/21), polatuzumab (12/21/21) (hx Rituxan reaction).  Now s/p YESCARTA on 1/17/22.  Please see previous notes for further details on the patient's history. He comes in today for routine follow up.    Interval History:  Patient reports that he is now dealing with prostate cancer and will be having scan on May 10.  He continues to be retired in which he most recently worked as a .  He reports occasional headaches managed with Tylenol.  He has noticed some more floaters with his vision.  His last eye exam was a year or 2 ago.  He has dyspnea on exertion that is unchanged.  He has some abdominal pain if he hits potholes while driving or is doing more intense physical work.  This pain can be sharp at times and sometimes is associated with nausea.  He denies any vomiting.  He denies any current abdominal pain.  He is keeping busy with doing yard work and work around the house.  He denies any heartburn and continues on his omeprazole.  He denies any bleeding or night sweats.  He reports that his appetite has decreased after receiving a weight loss injection.  He is drinking about 3 to 4 glasses of fluids per day.  He denies other concerns.    Review of Systems:  Patient denies any of the following except if noted above: fevers, chills, difficulty with energy, hearing changes, chest pain, dyspnea, vomiting, diarrhea, constipation, urinary concerns, numbness, tingling, issues with sleep  or mood.     Current Outpatient Medications   Medication Sig Dispense Refill    acyclovir (ZOVIRAX) 800 MG tablet Take 1 tablet (800 mg) by mouth every 12 hours 180 tablet 1    albuterol (PROAIR HFA/PROVENTIL HFA/VENTOLIN HFA) 108 (90 Base) MCG/ACT inhaler Inhale 2 puffs into the lungs every 6 hours      beclomethasone HFA (QVAR REDIHALER) 80 MCG/ACT inhaler INHALE 1 PUFF BY MOUTH TWICE DAILY WHEN FOR SICK      benzonatate (TESSALON) 100 MG capsule       Calcium Polycarbophil (FIBER-CAPS PO) Take 2 capsules by mouth daily      fluticasone (FLONASE) 50 MCG/ACT nasal spray USE 2 SPRAYS PER NOSTRIL ONCE A DAY FOR NASAL CONGESTION. 14 DAYS      guaiFENesin (ROBITUSSIN) 100 MG/5ML SYRP Take 10 mLs by mouth every 4 hours as needed for cough 118 mL 0    loratadine (CLARITIN) 10 MG tablet TAKE 1 TABLET BY MOUTH EVERY DAY FOR ALLERGIES      medical cannabis (Patient's own supply) 1 Dose daily as needed (The purpose of this order is to document that the patient reports taking medical cannabis)      multivitamin w/minerals (THERA-VIT-M) tablet Take 1 tablet by mouth daily      omeprazole (PRILOSEC) 20 MG DR capsule TAKE 2 CAPSULES(40 MG) BY MOUTH DAILY 180 capsule 3    Semaglutide-Weight Management (WEGOVY) 0.25 MG/0.5ML pen Inject 0.25 mg Subcutaneous once a week Started 3 weeks ago. Prescribed by [facility]      sulfamethoxazole-trimethoprim (BACTRIM DS) 800-160 MG tablet Take 1 tablet by mouth 2 times daily On Mo and Tue only 16 tablet 5    tolterodine ER (DETROL LA) 4 MG 24 hr capsule Take 1 capsule (4 mg) by mouth daily 90 capsule 11     Physical Examination:  General: The patient is a pleasant male in no acute distress.  /67 (BP Location: Left arm)   Pulse 65   Temp 99  F (37.2  C) (Oral)   Resp 18   Wt 105.5 kg (232 lb 9.6 oz)   SpO2 97%   BMI 35.87 kg/m    Wt Readings from Last 10 Encounters:   05/02/23 105.5 kg (232 lb 9.6 oz)   03/03/23 105.1 kg (231 lb 11.2 oz)   02/01/23 105.6 kg (232 lb 12.8 oz)   01/19/23  104.1 kg (229 lb 9.6 oz)   10/19/22 104.6 kg (230 lb 11.2 oz)   08/05/22 105.6 kg (232 lb 12.9 oz)   04/18/22 111.1 kg (245 lb)   03/17/22 109.4 kg (241 lb 1.6 oz)   02/17/22 107.5 kg (237 lb 1.6 oz)   02/14/22 106.4 kg (234 lb 8 oz)     HEENT: EOMI. Sclerae are anicteric.   Lymph: Neck is supple with no lymphadenopathy in the cervical or supraclavicular areas. No palpable adenopathy in the axillary or inguinal areas bilaterally.   Heart: Regular rate and rhythm.   Lungs: Clear to auscultation bilaterally.   Abdomen: Bowel sounds present, soft, mild central abdominal tenderness, remainder nontender with no palpable hepatosplenomegaly or masses.   Extremities: Trace sockline lower extremity edema noted bilaterally.   Neuro: Cranial nerves II through XII are grossly intact.  Skin: No rashes, petechiae, or bruising noted on exposed skin.    Laboratory Data:  Most Recent 3 CBC's:  Recent Labs   Lab Test 05/02/23  1104 03/01/23  0921 01/18/23  1544   WBC 4.5 3.5* 6.2   HGB 11.8* 12.4* 11.5*   MCV 99 96 99   PLT 91* 93* 113*   ANEUTAUTO 3.2 2.8 5.1    Most Recent 3 BMP's:  Recent Labs   Lab Test 05/02/23  1104 03/01/23  0921 01/18/23  1544    139 140   POTASSIUM 4.1 4.0 3.8   CHLORIDE 104 104 104   CO2 27 24 25   BUN 12.6 15.5 11.0   CR 1.28* 1.27* 1.06   ANIONGAP 8 11 11   TRE 9.2 9.4 9.3   * 151* 127*   PROTTOTAL 6.0* 6.5 6.3*   ALBUMIN 4.2 4.4 4.3    Most Recent 2 LFT's:  Recent Labs   Lab Test 05/02/23  1104 03/01/23  0921   AST 22 25   ALT 27 27   ALKPHOS 72 76   BILITOTAL 0.4 0.3   I reviewed the above labs today.    Assessment and Plan:  Follicular lymphoma, s/p treatment with YESCARTA in January 2022. He remains in remission and is doing well today. His labs are generally stable. He will follow-up with Dr. Terry in 3 months with restaging imaging. He will call sooner for concerns.     Anemia and thrombocytopenia. Chronic and stable. Suspect related to prior chemotherapy.       Hypogammaglobulinemia. Patient has received IVIG, last in March 2023. IgG level from today is pending. Will give additional IVIG if IgG level is less than 300.    Prophylaxis. He will continue on acyclovir 400 mg bid. He also also been receiving monthly pentamidine, dose 4 of 6 was dose. Will recheck his CD4 level today. Last level was still low at 126.     Acid reflux. Managed with omeprazole, which he will continue.      Renal insufficiency. Noted on the last 2 checks. Recommend pushing fluids with a goal of at least 64 oz/day.     History of stable angina and NSTEMI type II.  Evaluated by cardiology prior to transplant, CTA coronary artery and echo completed.  Carotid artery plaque - we previously asked to follow-up with PCP.    Prostate cancer. S/p radical prostatectomy and bilateral lymph node dissection 11/2017. Completed radiation to the prostate fossa and pelvic lymph nodes at [facility] early May 2018. He received 4500 cGy in 25 fractions. He then had 2340 cGy boost in 13 fractions to the prostatic fossa, for a total dose of 6240 cGy in 38 treatment fractions delivered over 54 days. He did not receive hormonal therapy. Last PSA was normal at 1.95 on 4/12/23. He reports that he is getting imaging related to his prostate on 5/10.     Health care maintenance.  Eye exams: Recommend exams at least every 2 years. Recommend scheduling.  Dental exams: Recommend exams and cleanings every 6 months. Scheduled for June.  Primary care: Recommend follow up at least annually. Up to date  Skin care: Recommend the use of sunscreen with SPF of at least 30, reapplying every 2 hours with prolonged sun exposure.    Tobacco use: Recommend abstaining. Denies use.  Alcohol use: Recommend no more than 2/day for men. Has 2/day on weekdays and about 4-5 on the weekends. Encouraged cutting back.   Physical activity: Recommend regular activity, ideally 150 minutes/week of moderate intensity activity. Goes for some walks and  has a treadmill.     Irma Onofre PA-C  [facility]  [address] [place] [address]  [phone]    37 minutes spent on the date of the encounter doing chart review, review of test results, interpretation of tests, patient visit and documentation

## 2023-05-02 NOTE — NURSING NOTE
Chief Complaint   Patient presents with     Labs Only     Labs drawn via port by RN in lab, vitals completed     Port accessed using 20g 3/4inch needle, pt tolerated well. Labs drawn with ease, line flushed with saline and heparin. Vitals completed.    Apryl Mayorga, RN

## 2023-05-02 NOTE — NURSING NOTE
"Oncology Rooming Note    May 2, 2023 12:04 PM   uJvenal Blake is a 57 year old male who presents for:    Chief Complaint   Patient presents with     Labs Only     Labs drawn via port by RN in lab, vitals completed     Oncology Clinic Visit     [facility] RETURN - DLBCL     Initial Vitals: /67 (BP Location: Left arm)   Pulse 65   Temp 99  F (37.2  C) (Oral)   Resp 18   Wt 105.5 kg (232 lb 9.6 oz)   SpO2 97%   BMI 35.87 kg/m   Estimated body mass index is 35.87 kg/m  as calculated from the following:    Height as of 1/17/22: 1.715 m (5' 7.52\").    Weight as of this encounter: 105.5 kg (232 lb 9.6 oz). Body surface area is 2.24 meters squared.  Moderate Pain (5) Comment: Data Unavailable   No LMP for male patient.  Allergies reviewed: Yes  Medications reviewed: Yes    Medications: Medication refills not needed today.  Pharmacy name entered into [facility]:    [id number] - [place], [place] - [address] AT [facility] - [place], [place] - [address] [place], [place] [address]YAHIR garcia            "

## 2023-05-03 LAB — IGG SERPL-MCNC: 355 MG/DL (ref 610–1616)

## 2023-05-10 ENCOUNTER — TRANSFERRED RECORDS (OUTPATIENT)
Dept: HEALTH INFORMATION MANAGEMENT | Facility: CLINIC | Age: 58
End: 2023-05-10
Payer: COMMERCIAL

## 2023-05-12 ENCOUNTER — PATIENT OUTREACH (OUTPATIENT)
Dept: ONCOLOGY | Facility: CLINIC | Age: 58
End: 2023-05-12
Payer: COMMERCIAL

## 2023-05-12 NOTE — PROGRESS NOTES
[facility]: Cancer Care Short Note                                    Discussion with Patient:                                                        OUTBOUND CALL: RNCC called patient. Discussed with his status of pentamidine. Per NAYA Irma and Dr. Terry, will give him two more monthly doses and he can stop pentamidine afterwards.   Also received STD paperwork for him, as this was received by RNCC.He believe he can go back to [employer] part time but not full time, as he works as a . Would liek to keep SSD (paprwork from this not received by RNCC).   Will complete disability paperwork from [place] and reevaluate patiernt's working status in a few months.     Pt verbalizes understanding and has no other questions, comments, or concerns at this time.     Suri Landa, RN, MSN, OCN   RN Care Coordinator   [facility]   [address] [place] [id number]   [phone]

## 2023-05-16 ENCOUNTER — TELEPHONE (OUTPATIENT)
Dept: ONCOLOGY | Facility: CLINIC | Age: 58
End: 2023-05-16
Payer: COMMERCIAL

## 2023-05-23 NOTE — TELEPHONE ENCOUNTER
ADA paperwork received via triage from [email or web address]. Will be placed in provider folder for signature upon completion.     Fax: [phone]      Carlos Alberto Powell  
Forms filled out and placed in provider folder for approval.    Carlos Alberto Powell    
Signed form received and faxed to [employer], fax # [phone]. Successful transmission verified via rightfax. Copy of forms sent to scanning, original forms mailed to patient's home address on file.    Kinga Berry on 5/23/2023 at 11:38 AM    
-The cord will gradually dry up and fall off in 2-3 weeks.

## 2023-05-25 ENCOUNTER — TELEPHONE (OUTPATIENT)
Dept: TRANSPLANT | Facility: CLINIC | Age: 58
End: 2023-05-25
Payer: COMMERCIAL

## 2023-05-25 NOTE — TELEPHONE ENCOUNTER
Contacted patient to schedule consent meeting prior to appointments next week. We will meet prior to his scheduled appointments Tuesday, 30May2023. Answered any questions and/or concern the patient had.

## 2023-05-30 ENCOUNTER — ALLIED HEALTH/NURSE VISIT (OUTPATIENT)
Dept: TRANSPLANT | Facility: CLINIC | Age: 58
End: 2023-05-30

## 2023-05-30 DIAGNOSIS — C82.08 FOLLICULAR LYMPHOMA GRADE I, LYMPH NODES OF MULTIPLE SITES (H): Primary | ICD-10-CM

## 2023-05-30 DIAGNOSIS — C83.398 DIFFUSE LARGE B-CELL LYMPHOMA OF EXTRANODAL SITE EXCLUDING SPLEEN AND OTHER SOLID ORGANS: Primary | ICD-10-CM

## 2023-05-30 DIAGNOSIS — Z85.72 HISTORY OF LYMPHOMA: ICD-10-CM

## 2023-05-30 PROCEDURE — 94640 AIRWAY INHALATION TREATMENT: CPT | Performed by: INTERNAL MEDICINE

## 2023-05-30 PROCEDURE — 94642 AEROSOL INHALATION TREATMENT: CPT | Performed by: INTERNAL MEDICINE

## 2023-05-30 RX ORDER — PENTAMIDINE ISETHIONATE 300 MG/300MG
300 INHALANT RESPIRATORY (INHALATION) ONCE
Status: CANCELLED
Start: 2023-06-27 | End: 2023-06-27

## 2023-05-30 RX ORDER — DIPHENHYDRAMINE HYDROCHLORIDE 50 MG/ML
50 INJECTION INTRAMUSCULAR; INTRAVENOUS
Status: CANCELLED
Start: 2023-06-27

## 2023-05-30 RX ORDER — ALBUTEROL SULFATE 90 UG/1
1-2 AEROSOL, METERED RESPIRATORY (INHALATION)
Status: CANCELLED
Start: 2023-06-27

## 2023-05-30 RX ORDER — MEPERIDINE HYDROCHLORIDE 25 MG/ML
25 INJECTION INTRAMUSCULAR; INTRAVENOUS; SUBCUTANEOUS EVERY 30 MIN PRN
Status: CANCELLED | OUTPATIENT
Start: 2023-06-27

## 2023-05-30 RX ORDER — PENTAMIDINE ISETHIONATE 300 MG/300MG
300 INHALANT RESPIRATORY (INHALATION) ONCE
Status: COMPLETED | OUTPATIENT
Start: 2023-05-30 | End: 2023-05-30

## 2023-05-30 RX ORDER — EPINEPHRINE 1 MG/ML
0.3 INJECTION, SOLUTION, CONCENTRATE INTRAVENOUS EVERY 5 MIN PRN
Status: CANCELLED | OUTPATIENT
Start: 2023-06-27

## 2023-05-30 RX ORDER — METHYLPREDNISOLONE SODIUM SUCCINATE 125 MG/2ML
125 INJECTION, POWDER, LYOPHILIZED, FOR SOLUTION INTRAMUSCULAR; INTRAVENOUS
Status: CANCELLED
Start: 2023-06-27

## 2023-05-30 RX ORDER — ALBUTEROL SULFATE 0.83 MG/ML
2.5 SOLUTION RESPIRATORY (INHALATION) ONCE
Status: CANCELLED
Start: 2023-06-27 | End: 2023-06-27

## 2023-05-30 RX ORDER — ALBUTEROL SULFATE 0.83 MG/ML
2.5 SOLUTION RESPIRATORY (INHALATION)
Status: CANCELLED | OUTPATIENT
Start: 2023-06-27

## 2023-05-30 RX ADMIN — PENTAMIDINE ISETHIONATE 300 MG: 300 INHALANT RESPIRATORY (INHALATION) at 10:01

## 2023-05-30 NOTE — PROGRESS NOTES
The consent form, including purpose, risks and benefits, was reviewed with Juvenal Blake, and all questions were answered before he signed the consent form. The patient understands that the study involves post-treatment follow up phase continuing the 15 years and replaced the previous FATE consent.      Present during the discussion was Juvenal and myself. A copy of the signed form was provided to the patient. No procedures specific to this study were performed prior to the patient signing the consent form.     Consent Version Date: [id number]  Consent obtained by: Sarah Beth Kessler RN    Date: 5/30/2023  HIPAA authorization signed?: Yes, embedded within the consent.    Corey Lyn, Clinical Research Coordinator

## 2023-05-30 NOTE — PROGRESS NOTES
Juvenal Blake was seen today for a Pentamidine nebulizer tx ordered by Dr. Rosa Terry.    Patient was first given 4 puffs albuterol MDI, after which Pentamidine 300 mg inhalation solution mixed with 6cc Sterile H20 was administered through a filtered nebulizer.    No adverse side effects noted by the patient.    This service today was provided with Dr. Leslie Peter, a physician on duty, who was available if needed.     Procedure was completed by Cody Nelson.

## 2023-06-09 ENCOUNTER — TELEPHONE (OUTPATIENT)
Dept: ONCOLOGY | Facility: HOSPITAL | Age: 58
End: 2023-06-09

## 2023-06-09 NOTE — TELEPHONE ENCOUNTER
Prior Authorization Retail Medication Request    Medication/Dose: omeprazole (PRILOSEC) 20 MG DR capsule  ICD code (if different than what is on RX):  Follicular lymphoma grade i, lymph nodes of multiple sites (H) [C82.08]   Previously Tried and Failed:   Rationale:      Insurance Name: [employer]  Insurance ID:  [id number]    Pharmacy Information (if different than what is on RX)  Name:  [id number] - [place], [place] - [address] AT [facility]  Phone:  [phone]

## 2023-06-09 NOTE — TELEPHONE ENCOUNTER
Central Prior Authorization Team   Phone: [phone]      PA Initiation    Medication: OMEPRAZOLE 20 MG PO CPDR  Insurance Company: [employer] - Phone [phone] Fax [phone]  Pharmacy Filling the Rx: [id number] - [place], [place] - [address] AT [facility]  Filling Pharmacy Phone: [phone]  Filling Pharmacy Fax:    Start Date: 6/9/2023

## 2023-06-13 ENCOUNTER — TRANSFERRED RECORDS (OUTPATIENT)
Dept: HEALTH INFORMATION MANAGEMENT | Facility: CLINIC | Age: 58
End: 2023-06-13
Payer: COMMERCIAL

## 2023-06-16 NOTE — TELEPHONE ENCOUNTER
PRIOR AUTHORIZATION DENIED    Medication: OMEPRAZOLE 20 MG PO CPDR  Insurance Company: [employer] - Phone [phone] Fax [phone]  Denial Date: 6/9/2023  Denial Rational:           Appeal Information:     Patient Notified: No

## 2023-06-22 DIAGNOSIS — C82.08 FOLLICULAR LYMPHOMA GRADE I, LYMPH NODES OF MULTIPLE SITES (H): ICD-10-CM

## 2023-06-22 DIAGNOSIS — C82.00 FOLLICULAR LYMPHOMA GRADE I, UNSPECIFIED BODY REGION (H): Primary | ICD-10-CM

## 2023-06-27 DIAGNOSIS — C83.398 DIFFUSE LARGE B-CELL LYMPHOMA OF EXTRANODAL SITE EXCLUDING SPLEEN AND OTHER SOLID ORGANS: Primary | ICD-10-CM

## 2023-06-27 DIAGNOSIS — Z85.72 HISTORY OF LYMPHOMA: ICD-10-CM

## 2023-06-27 PROCEDURE — 94642 AEROSOL INHALATION TREATMENT: CPT | Performed by: INTERNAL MEDICINE

## 2023-06-27 PROCEDURE — 94640 AIRWAY INHALATION TREATMENT: CPT | Performed by: INTERNAL MEDICINE

## 2023-06-27 RX ORDER — PENTAMIDINE ISETHIONATE 300 MG/300MG
300 INHALANT RESPIRATORY (INHALATION) ONCE
Status: CANCELLED
Start: 2023-07-25 | End: 2023-07-25

## 2023-06-27 RX ORDER — MEPERIDINE HYDROCHLORIDE 25 MG/ML
25 INJECTION INTRAMUSCULAR; INTRAVENOUS; SUBCUTANEOUS EVERY 30 MIN PRN
Status: CANCELLED | OUTPATIENT
Start: 2023-07-25

## 2023-06-27 RX ORDER — ALBUTEROL SULFATE 90 UG/1
1-2 AEROSOL, METERED RESPIRATORY (INHALATION)
Status: CANCELLED
Start: 2023-07-25

## 2023-06-27 RX ORDER — ALBUTEROL SULFATE 0.83 MG/ML
2.5 SOLUTION RESPIRATORY (INHALATION) ONCE
Status: CANCELLED
Start: 2023-07-25 | End: 2023-07-25

## 2023-06-27 RX ORDER — METHYLPREDNISOLONE SODIUM SUCCINATE 125 MG/2ML
125 INJECTION, POWDER, LYOPHILIZED, FOR SOLUTION INTRAMUSCULAR; INTRAVENOUS
Status: CANCELLED
Start: 2023-07-25

## 2023-06-27 RX ORDER — ALBUTEROL SULFATE 0.83 MG/ML
2.5 SOLUTION RESPIRATORY (INHALATION)
Status: CANCELLED | OUTPATIENT
Start: 2023-07-25

## 2023-06-27 RX ORDER — PENTAMIDINE ISETHIONATE 300 MG/300MG
300 INHALANT RESPIRATORY (INHALATION) ONCE
Status: COMPLETED | OUTPATIENT
Start: 2023-06-27 | End: 2023-06-27

## 2023-06-27 RX ORDER — DIPHENHYDRAMINE HYDROCHLORIDE 50 MG/ML
50 INJECTION INTRAMUSCULAR; INTRAVENOUS
Status: CANCELLED
Start: 2023-07-25

## 2023-06-27 RX ORDER — EPINEPHRINE 1 MG/ML
0.3 INJECTION, SOLUTION, CONCENTRATE INTRAVENOUS EVERY 5 MIN PRN
Status: CANCELLED | OUTPATIENT
Start: 2023-07-25

## 2023-06-27 RX ORDER — ALBUTEROL SULFATE 0.83 MG/ML
2.5 SOLUTION RESPIRATORY (INHALATION) ONCE
Status: DISCONTINUED | OUTPATIENT
Start: 2023-06-27 | End: 2023-06-27 | Stop reason: HOSPADM

## 2023-06-27 RX ADMIN — PENTAMIDINE ISETHIONATE 300 MG: 300 INHALANT RESPIRATORY (INHALATION) at 10:16

## 2023-06-27 NOTE — PROGRESS NOTES
Juvenal MCGRAW Gabrielchristiano was seen today for a Pentamidine nebulizer tx ordered by Darrell Velasquez.    Patient was first given 4 puffs Albuterol, after which Pentamidine 300 mg (Lot # [id number]) mixed with 6cc Sterile H20 was administered through a filtered nebulizer.    Pre-treatment: SpO2 = 96%   HR = 63   BBS = clear   Post-treatment: SpO2 = 98%  HR = 66  BBS = clear    No adverse side effects noted by the patient.    This service today was provided under the supervision of Dr. Peter, who was available if needed.     Procedure was completed by Dimple Max RRT.      
Patient/Caregiver provided printed discharge information.

## 2023-06-30 NOTE — TELEPHONE ENCOUNTER
Contacted Juvenal to discuss plan for eval of current picc and possible replacement on 8/24 at 8am. He verbalized understanding of plan and all questions were answered to his satisfaction.    Denies dentures. Denies loose teeth./normal

## 2023-07-12 NOTE — NURSING NOTE
Pt received his GCSF with no concerns.    Maricel Clayton MA     [FreeTextEntry1] : Laboratory data, radiology and pathology reviewed in detail at the time of visit.\par

## 2023-07-20 NOTE — PROGRESS NOTES
RNCC notified patient of denial of omeprazole. Pt has a 90 day supply currently, but RNCC informed him that he can purchase this OTC if he would like. Dose remains the same. Patient verbalizes understanding of POC and has no other questions or concerns at this time.     Suri Landa, RN, MSN, OCN   RN Care Coordinator   [facility] [address] [address] [phone]

## 2023-08-02 ENCOUNTER — LAB (OUTPATIENT)
Dept: LAB | Facility: CLINIC | Age: 58
End: 2023-08-02
Payer: COMMERCIAL

## 2023-08-02 ENCOUNTER — ONCOLOGY VISIT (OUTPATIENT)
Dept: TRANSPLANT | Facility: CLINIC | Age: 58
End: 2023-08-02
Payer: COMMERCIAL

## 2023-08-02 ENCOUNTER — LAB REQUISITION (OUTPATIENT)
Dept: LAB | Facility: CLINIC | Age: 58
End: 2023-08-02

## 2023-08-02 VITALS
TEMPERATURE: 98.9 F | OXYGEN SATURATION: 97 % | HEART RATE: 65 BPM | SYSTOLIC BLOOD PRESSURE: 145 MMHG | WEIGHT: 231 LBS | DIASTOLIC BLOOD PRESSURE: 76 MMHG | RESPIRATION RATE: 16 BRPM | BODY MASS INDEX: 35.62 KG/M2

## 2023-08-02 DIAGNOSIS — C82.08 FOLLICULAR LYMPHOMA GRADE I, LYMPH NODES OF MULTIPLE SITES (H): Primary | ICD-10-CM

## 2023-08-02 DIAGNOSIS — C82.00 FOLLICULAR LYMPHOMA GRADE I, UNSPECIFIED BODY REGION (H): ICD-10-CM

## 2023-08-02 DIAGNOSIS — D72.818 LOW CD4 CELL COUNT DETERMINED BY FLOW CYTOMETRY: ICD-10-CM

## 2023-08-02 DIAGNOSIS — D80.1 HYPOGAMMAGLOBULINEMIA (H): ICD-10-CM

## 2023-08-02 LAB
ALBUMIN SERPL BCG-MCNC: 4.5 G/DL (ref 3.5–5.2)
ALP SERPL-CCNC: 75 U/L (ref 40–129)
ALT SERPL W P-5'-P-CCNC: 32 U/L (ref 0–70)
ANION GAP SERPL CALCULATED.3IONS-SCNC: 11 MMOL/L (ref 7–15)
AST SERPL W P-5'-P-CCNC: 25 U/L (ref 0–45)
BASOPHILS # BLD AUTO: 0 10E3/UL (ref 0–0.2)
BASOPHILS NFR BLD AUTO: 0 %
BILIRUB SERPL-MCNC: 0.5 MG/DL
BUN SERPL-MCNC: 15.4 MG/DL (ref 8–23)
CALCIUM SERPL-MCNC: 9.7 MG/DL (ref 8.6–10)
CHLORIDE SERPL-SCNC: 102 MMOL/L (ref 98–107)
CREAT SERPL-MCNC: 1.4 MG/DL (ref 0.67–1.17)
DEPRECATED HCO3 PLAS-SCNC: 25 MMOL/L (ref 22–29)
EOSINOPHIL # BLD AUTO: 0.1 10E3/UL (ref 0–0.7)
EOSINOPHIL NFR BLD AUTO: 3 %
ERYTHROCYTE [DISTWIDTH] IN BLOOD BY AUTOMATED COUNT: 14 % (ref 10–15)
GFR SERPL CREATININE-BSD FRML MDRD: 58 ML/MIN/1.73M2
GLUCOSE SERPL-MCNC: 105 MG/DL (ref 70–99)
HCT VFR BLD AUTO: 35.3 % (ref 40–53)
HGB BLD-MCNC: 12.2 G/DL (ref 13.3–17.7)
IMM GRANULOCYTES # BLD: 0 10E3/UL
IMM GRANULOCYTES NFR BLD: 1 %
LDH SERPL L TO P-CCNC: 174 U/L (ref 0–250)
LYMPHOCYTES # BLD AUTO: 0.5 10E3/UL (ref 0.8–5.3)
LYMPHOCYTES NFR BLD AUTO: 11 %
MAGNESIUM SERPL-MCNC: 2 MG/DL (ref 1.7–2.3)
MCH RBC QN AUTO: 33.6 PG (ref 26.5–33)
MCHC RBC AUTO-ENTMCNC: 34.6 G/DL (ref 31.5–36.5)
MCV RBC AUTO: 97 FL (ref 78–100)
MONOCYTES # BLD AUTO: 0.6 10E3/UL (ref 0–1.3)
MONOCYTES NFR BLD AUTO: 13 %
NEUTROPHILS # BLD AUTO: 3.1 10E3/UL (ref 1.6–8.3)
NEUTROPHILS NFR BLD AUTO: 72 %
NRBC # BLD AUTO: 0 10E3/UL
NRBC BLD AUTO-RTO: 0 /100
PHOSPHATE SERPL-MCNC: 3.4 MG/DL (ref 2.5–4.5)
PLATELET # BLD AUTO: 91 10E3/UL (ref 150–450)
POTASSIUM SERPL-SCNC: 4.2 MMOL/L (ref 3.4–5.3)
PROT SERPL-MCNC: 6.2 G/DL (ref 6.4–8.3)
RBC # BLD AUTO: 3.63 10E6/UL (ref 4.4–5.9)
SODIUM SERPL-SCNC: 138 MMOL/L (ref 136–145)
WBC # BLD AUTO: 4.3 10E3/UL (ref 4–11)

## 2023-08-02 PROCEDURE — 99214 OFFICE O/P EST MOD 30 MIN: CPT

## 2023-08-02 PROCEDURE — 85018 HEMOGLOBIN: CPT

## 2023-08-02 PROCEDURE — 87635 SARS-COV-2 COVID-19 AMP PRB: CPT | Performed by: FAMILY MEDICINE

## 2023-08-02 PROCEDURE — 250N000011 HC RX IP 250 OP 636: Mod: JZ

## 2023-08-02 PROCEDURE — 36591 DRAW BLOOD OFF VENOUS DEVICE: CPT

## 2023-08-02 PROCEDURE — G0463 HOSPITAL OUTPT CLINIC VISIT: HCPCS

## 2023-08-02 PROCEDURE — 83735 ASSAY OF MAGNESIUM: CPT

## 2023-08-02 PROCEDURE — 82784 ASSAY IGA/IGD/IGG/IGM EACH: CPT

## 2023-08-02 PROCEDURE — 80053 COMPREHEN METABOLIC PANEL: CPT

## 2023-08-02 PROCEDURE — 83615 LACTATE (LD) (LDH) ENZYME: CPT

## 2023-08-02 PROCEDURE — 84100 ASSAY OF PHOSPHORUS: CPT

## 2023-08-02 PROCEDURE — 86360 T CELL ABSOLUTE COUNT/RATIO: CPT

## 2023-08-02 RX ORDER — SULFAMETHOXAZOLE/TRIMETHOPRIM 800-160 MG
1 TABLET ORAL 2 TIMES DAILY
Qty: 16 TABLET | Refills: 5 | Status: SHIPPED | OUTPATIENT
Start: 2023-08-02 | End: 2024-01-04

## 2023-08-02 RX ORDER — HEPARIN SODIUM (PORCINE) LOCK FLUSH IV SOLN 100 UNIT/ML 100 UNIT/ML
5 SOLUTION INTRAVENOUS
Status: COMPLETED | OUTPATIENT
Start: 2023-08-02 | End: 2023-08-02

## 2023-08-02 RX ORDER — ACYCLOVIR 800 MG/1
800 TABLET ORAL EVERY 12 HOURS
Qty: 180 TABLET | Refills: 1 | Status: SHIPPED | OUTPATIENT
Start: 2023-08-02 | End: 2024-09-03

## 2023-08-02 RX ADMIN — Medication 5 ML: at 15:58

## 2023-08-02 ASSESSMENT — PAIN SCALES - GENERAL: PAINLEVEL: EXTREME PAIN (9)

## 2023-08-02 NOTE — PROGRESS NOTES
CELL THERAPY ANNIVERSARY VISIT.  8/2/2023    Juvenal is 18 months after Yescarta infusion for his multiply relapsed follicular B-cell lymphoma.  He has been in CR and had restaging now. This is an anniversary visit.     INTERIM HISTORY: Juvenal is: feeling very well but has ongoing infections. Another cold and cough started 4 days ago, he was given antibiotics by his primary MD. He thinks IVIG was helping. No fevers or chills, no SOB, but he is coughing   He is not smoking now.   His has shoulder pains and hope to have surgery towards late august.     ROS:   he is not working at this time , on disability and interested to stay on it for some time.  No weight loss, no N/V, moving around well.     On exam: AO, in good spirits. Not ill apearring. BP (!) 145/76 (BP Location: Right arm, Patient Position: Sitting, Cuff Size: Adult Large)   Pulse 65   Temp 98.9  F (37.2  C) (Oral)   Resp 16   Wt 104.8 kg (231 lb)   SpO2 97%   BMI 35.62 kg/m    Physical Exam:    Patient is ambulating , AOx3, NAD, well appearing patient. No adenpathy   HEENT: No mucositis, MM moist, no thrush or ulcers, buccal mucosa intact  Neck supple, no peripheral adenopathy Thyroid gland midline, not enlarged   Lungs: good air exchange, CTA, no crackles,no wheezing.   Heart RRR S1 S2, no murmur or gallop   Abd soft, NT, ND. Without hepato-splenomegaly, no ascites,    LE symmetrical, no edema   Skin without lesion or rashes. No petechiae or ecchymosis.  Neuro  Intact, AOx3      Lab Results   Component Value Date    WBC 4.5 05/02/2023    ANEU 3.1 04/18/2022    HGB 11.8 (L) 05/02/2023    HCT 34.2 (L) 05/02/2023    PLT 91 (L) 05/02/2023     05/02/2023    POTASSIUM 4.1 05/02/2023    CHLORIDE 104 05/02/2023    CO2 27 05/02/2023     (H) 05/02/2023    BUN 12.6 05/02/2023    CR 1.28 (H) 05/02/2023    MAG 1.9 01/18/2023    INR 0.96 02/17/2022    AST 22 05/02/2023    ALT 27 05/02/2023   CD4 count 111 (aug 23)  CD19 count 67   IgG level 247  (august 2023)    CT CAP 1/18/2023  IMPRESSION:  No significant interval change or findings to suggest progression of disease.  CT neck 1/18/2023  COVID neg 8/2/2023                                                                     IMPRESSION:   1. No suspicious cervical lymphadenopathy.  2. Internal carotid artery atherosclerosis, with left greater than  right internal carotid artery narrowing. Consider ultrasound or  CTA/MRA for further evaluation.       ASSESSMENT AND PLAN:    Assessment:  Juvenal Blake is a 57 year old male with  refractory follicular lymphoma, 1.5 years after Yescarta CAR-T therapy.     1. Follicular Lymphoma/Yescarta CAR-T:    Relapsed after BR, OCHOP, NAM NK (9/1/20), idelalisib (~4-6/2020),  (6/28/21), polatuzumab (12/21/21). (hx Rituxan reaction).      Now s/p YESCARTa  In CR at 12 months. [facility] ongoing remission at 18 months.    Next restaging at 2 years     2. HEME: stable counts.plts `100 - better.   ANC recovered.      3. ID:  recent COVID in august.   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 Pt will need IVIG replacement, We will re-start monthly given ongoing URI.      #Prophy: acyclovir 800mg BID.   CD4 is still lowish 126- I recc to resume PJP porphylaxis  I re-ordred Bactrim po on Mo, Tue.              4. GI:    - omeprazole for acid reflux  - hx medical cannibis     5. Renal/FEN: Creat, lytes wnl.  Avoid NSAIDs.    6. CV:    History of stable angina and NSTEMI type II.  Evaluated by cardiology prior to transplant, CTA coronary artery and echo completed.    Carotid artery plaque - we asked to follow-up with PCP      7. : recc to r/u with urology re bladder wall thickening  #hx Prostatic adenocarcinoma: s/p radical prostatectomy and bilateral lymph node dissection 11/2017. Completed radiation to the prostate fossa and pelvic lymph nodes at [facility] early May 2018. He received 4500 cGy in 25 fractions. He then had 2340 cGy  boost in 13 fractions to the prostatic fossa, for a total dose of 6240 cGy in 38 treatment fractions delivered over 54 days. He did not receive hormonal therapy:    - PSA 1.21 on 1/25/22, 0.71 on 9/10/21    MSK  Shoulder surgery in late August. Ok from onc perspective, please obtain CBC a day before surgery to CBC plt count and ANC.   No special precautions.     Plan:  RTC in 3 months to see NAYA  Me at 2 years anniversary       Rosa Terry MD

## 2023-08-02 NOTE — NURSING NOTE
"Chief Complaint   Patient presents with    Port Draw     Labs drawn from port by rn.  VS taken.     Port accessed with 20 gauge 3/4\" gripper needle and labs drawn by rn.  Port flushed with NS and heparin then de-accessed.  Pt tolerated well.  VS taken.  Pt checked in for next appt.    Lexus Byrd RN      "

## 2023-08-02 NOTE — LETTER
8/2/2023         RE: Juvenal Blake  [address] [place] [address]        Dear Colleague,    Thank you for referring your patient, Juvenal Blake, to the [facility]. Please see a copy of my visit note below.        CELL THERAPY ANNIVERSARY VISIT.  8/2/2023    Juvenal is 18 months after Yescarta infusion for his multiply relapsed follicular B-cell lymphoma.  He has been in CR and had restaging now. This is an anniversary visit.     INTERIM HISTORY: Juvenal is: feeling very well but has ongoing infections. Another cold and cough started 4 days ago, he was given antibiotics by his primary MD. He thinks IVIG was helping. No fevers or chills, no SOB, but he is coughing   He is not smoking now.   His has shoulder pains and hope to have surgery towards late august.     ROS:   he is not working at this time , on disability and interested to stay on it for some time.  No weight loss, no N/V, moving around well.     On exam: AO, in good spirits. Not ill apearring. BP (!) 145/76 (BP Location: Right arm, Patient Position: Sitting, Cuff Size: Adult Large)   Pulse 65   Temp 98.9  F (37.2  C) (Oral)   Resp 16   Wt 104.8 kg (231 lb)   SpO2 97%   BMI 35.62 kg/m    Physical Exam:    Patient is ambulating , AOx3, NAD, well appearing patient. No adenpathy   HEENT: No mucositis, MM moist, no thrush or ulcers, buccal mucosa intact  Neck supple, no peripheral adenopathy Thyroid gland midline, not enlarged   Lungs: good air exchange, CTA, no crackles,no wheezing.   Heart RRR S1 S2, no murmur or gallop   Abd soft, NT, ND. Without hepato-splenomegaly, no ascites,    LE symmetrical, no edema   Skin without lesion or rashes. No petechiae or ecchymosis.  Neuro  Intact, AOx3      Lab Results   Component Value Date    WBC 4.5 05/02/2023    ANEU 3.1 04/18/2022    HGB 11.8 (L) 05/02/2023    HCT 34.2 (L) 05/02/2023    PLT 91 (L) 05/02/2023     05/02/2023    POTASSIUM 4.1  05/02/2023    CHLORIDE 104 05/02/2023    CO2 27 05/02/2023     (H) 05/02/2023    BUN 12.6 05/02/2023    CR 1.28 (H) 05/02/2023    MAG 1.9 01/18/2023    INR 0.96 02/17/2022    AST 22 05/02/2023    ALT 27 05/02/2023   CD4 count 111 (aug 23)  CD19 count 67   IgG level 247 (august 2023)    CT CAP 1/18/2023  IMPRESSION:  No significant interval change or findings to suggest progression of disease.  CT neck 1/18/2023  COVID neg 8/2/2023                                                                     IMPRESSION:   1. No suspicious cervical lymphadenopathy.  2. Internal carotid artery atherosclerosis, with left greater than  right internal carotid artery narrowing. Consider ultrasound or  CTA/MRA for further evaluation.       ASSESSMENT AND PLAN:    Assessment:  Juvenal Blake is a 57 year old male with  refractory follicular lymphoma, 1.5 years after Yescarta CAR-T therapy.     1. Follicular Lymphoma/Yescarta CAR-T:    Relapsed after BR, OCHOP, NAM NK (9/1/20), idelalisib (~4-6/2020),  (6/28/21), polatuzumab (12/21/21). (hx Rituxan reaction).      Now s/p YESCARTa  In CR at 12 months. [facility] ongoing remission at 18 months.    Next restaging at 2 years     2. HEME: stable counts.plts `100 - better.   ANC recovered.      3. ID:  recent COVID in august.   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 Pt will need IVIG replacement, We will re-start monthly given ongoing URI.      #Prophy: acyclovir 800mg BID.   CD4 is still lowish 126- I recc to resume PJP porphylaxis  I re-ordred Bactrim po on Mo, Tue.              4. GI:    - omeprazole for acid reflux  - hx medical cannibis     5. Renal/FEN: Creat, lytes wnl.  Avoid NSAIDs.    6. CV:    History of stable angina and NSTEMI type II.  Evaluated by cardiology prior to transplant, CTA coronary artery and echo completed.    Carotid artery plaque - we asked to follow-up with PCP      7. : recc to r/u with urology re bladder wall  thickening  #hx Prostatic adenocarcinoma: s/p radical prostatectomy and bilateral lymph node dissection 11/2017. Completed radiation to the prostate fossa and pelvic lymph nodes at [facility] early May 2018. He received 4500 cGy in 25 fractions. He then had 2340 cGy boost in 13 fractions to the prostatic fossa, for a total dose of 6240 cGy in 38 treatment fractions delivered over 54 days. He did not receive hormonal therapy:    - PSA 1.21 on 1/25/22, 0.71 on 9/10/21    MSK  Shoulder surgery in late August. Ok from onc perspective, please obtain CBC a day before surgery to CBC plt count and ANC.   No special precautions.     Plan:  RTC in 3 months to see NAYA  Me at 2 years anniversary       Rosa Terry MD

## 2023-08-03 LAB
CD3 CELLS # BLD: 202 CELLS/UL (ref 603–2990)
CD3 CELLS NFR BLD: 40 % (ref 49–84)
CD3+CD4+ CELLS # BLD: 111 CELLS/UL (ref 441–2156)
CD3+CD4+ CELLS NFR BLD: 22 % (ref 28–63)
CD3+CD4+ CELLS/CD3+CD8+ CLL BLD: 1.27 % (ref 1.4–2.6)
CD3+CD8+ CELLS # BLD: 87 CELLS/UL (ref 125–1312)
CD3+CD8+ CELLS NFR BLD: 17 % (ref 10–40)
IGG SERPL-MCNC: 247 MG/DL (ref 610–1616)
SARS-COV-2 RNA RESP QL NAA+PROBE: NEGATIVE
T CELL COMMENT: ABNORMAL

## 2023-08-06 RX ORDER — HEPARIN SODIUM,PORCINE 10 UNIT/ML
5 VIAL (ML) INTRAVENOUS
OUTPATIENT
Start: 2024-08-17

## 2023-08-06 RX ORDER — NALOXONE HYDROCHLORIDE 0.4 MG/ML
0.2 INJECTION, SOLUTION INTRAMUSCULAR; INTRAVENOUS; SUBCUTANEOUS
OUTPATIENT
Start: 2024-09-16

## 2023-08-06 RX ORDER — NALOXONE HYDROCHLORIDE 0.4 MG/ML
0.2 INJECTION, SOLUTION INTRAMUSCULAR; INTRAVENOUS; SUBCUTANEOUS
OUTPATIENT
Start: 2024-08-17

## 2023-08-06 RX ORDER — EPINEPHRINE 1 MG/ML
0.3 INJECTION, SOLUTION INTRAMUSCULAR; SUBCUTANEOUS EVERY 5 MIN PRN
OUTPATIENT
Start: 2024-09-16

## 2023-08-06 RX ORDER — ACETAMINOPHEN 325 MG/1
650 TABLET ORAL ONCE
OUTPATIENT
Start: 2024-08-17

## 2023-08-06 RX ORDER — METHYLPREDNISOLONE SODIUM SUCCINATE 125 MG/2ML
125 INJECTION, POWDER, LYOPHILIZED, FOR SOLUTION INTRAMUSCULAR; INTRAVENOUS
Start: 2024-09-16

## 2023-08-06 RX ORDER — DIPHENHYDRAMINE HCL 25 MG
50 CAPSULE ORAL ONCE
OUTPATIENT
Start: 2024-09-16

## 2023-08-06 RX ORDER — HEPARIN SODIUM,PORCINE 10 UNIT/ML
5 VIAL (ML) INTRAVENOUS
OUTPATIENT
Start: 2024-09-16

## 2023-08-06 RX ORDER — ALBUTEROL SULFATE 90 UG/1
1-2 AEROSOL, METERED RESPIRATORY (INHALATION)
Start: 2024-09-16

## 2023-08-06 RX ORDER — METHYLPREDNISOLONE SODIUM SUCCINATE 125 MG/2ML
125 INJECTION, POWDER, LYOPHILIZED, FOR SOLUTION INTRAMUSCULAR; INTRAVENOUS
Start: 2024-08-17

## 2023-08-06 RX ORDER — DIPHENHYDRAMINE HCL 25 MG
50 CAPSULE ORAL ONCE
OUTPATIENT
Start: 2024-08-17

## 2023-08-06 RX ORDER — HEPARIN SODIUM (PORCINE) LOCK FLUSH IV SOLN 100 UNIT/ML 100 UNIT/ML
5 SOLUTION INTRAVENOUS
OUTPATIENT
Start: 2024-08-17

## 2023-08-06 RX ORDER — DIPHENHYDRAMINE HYDROCHLORIDE 50 MG/ML
50 INJECTION INTRAMUSCULAR; INTRAVENOUS
Start: 2024-08-17

## 2023-08-06 RX ORDER — ALBUTEROL SULFATE 0.83 MG/ML
2.5 SOLUTION RESPIRATORY (INHALATION)
OUTPATIENT
Start: 2024-08-17

## 2023-08-06 RX ORDER — ALBUTEROL SULFATE 90 UG/1
1-2 AEROSOL, METERED RESPIRATORY (INHALATION)
Start: 2024-08-17

## 2023-08-06 RX ORDER — HEPARIN SODIUM (PORCINE) LOCK FLUSH IV SOLN 100 UNIT/ML 100 UNIT/ML
5 SOLUTION INTRAVENOUS
OUTPATIENT
Start: 2024-09-16

## 2023-08-06 RX ORDER — MEPERIDINE HYDROCHLORIDE 25 MG/ML
25 INJECTION INTRAMUSCULAR; INTRAVENOUS; SUBCUTANEOUS EVERY 30 MIN PRN
OUTPATIENT
Start: 2024-08-17

## 2023-08-06 RX ORDER — DIPHENHYDRAMINE HYDROCHLORIDE 50 MG/ML
50 INJECTION INTRAMUSCULAR; INTRAVENOUS
Start: 2024-09-16

## 2023-08-06 RX ORDER — MEPERIDINE HYDROCHLORIDE 25 MG/ML
25 INJECTION INTRAMUSCULAR; INTRAVENOUS; SUBCUTANEOUS EVERY 30 MIN PRN
OUTPATIENT
Start: 2024-09-16

## 2023-08-06 RX ORDER — ACETAMINOPHEN 325 MG/1
650 TABLET ORAL ONCE
OUTPATIENT
Start: 2024-09-16

## 2023-08-06 RX ORDER — ALBUTEROL SULFATE 0.83 MG/ML
2.5 SOLUTION RESPIRATORY (INHALATION)
OUTPATIENT
Start: 2024-09-16

## 2023-08-06 RX ORDER — EPINEPHRINE 1 MG/ML
0.3 INJECTION, SOLUTION INTRAMUSCULAR; SUBCUTANEOUS EVERY 5 MIN PRN
OUTPATIENT
Start: 2024-08-17

## 2023-08-08 ENCOUNTER — TELEPHONE (OUTPATIENT)
Dept: ONCOLOGY | Facility: CLINIC | Age: 58
End: 2023-08-08
Payer: COMMERCIAL

## 2023-08-08 DIAGNOSIS — D80.1 HYPOGAMMAGLOBULINEMIA (H): Primary | ICD-10-CM

## 2023-08-08 RX ORDER — ALBUTEROL SULFATE 90 UG/1
1-2 AEROSOL, METERED RESPIRATORY (INHALATION)
Status: DISCONTINUED | OUTPATIENT
Start: 2023-08-08 | End: 2023-08-08 | Stop reason: HOSPADM

## 2023-08-08 RX ORDER — EPINEPHRINE 1 MG/ML
0.3 INJECTION, SOLUTION INTRAMUSCULAR; SUBCUTANEOUS EVERY 5 MIN PRN
Status: DISCONTINUED | OUTPATIENT
Start: 2023-08-08 | End: 2023-08-08 | Stop reason: HOSPADM

## 2023-08-08 RX ORDER — ALBUTEROL SULFATE 0.83 MG/ML
2.5 SOLUTION RESPIRATORY (INHALATION)
Status: DISCONTINUED | OUTPATIENT
Start: 2023-08-08 | End: 2023-08-08 | Stop reason: HOSPADM

## 2023-08-08 RX ORDER — HEPARIN SODIUM (PORCINE) LOCK FLUSH IV SOLN 100 UNIT/ML 100 UNIT/ML
5 SOLUTION INTRAVENOUS
Status: DISCONTINUED | OUTPATIENT
Start: 2023-08-08 | End: 2023-08-08 | Stop reason: HOSPADM

## 2023-08-08 RX ORDER — NALOXONE HYDROCHLORIDE 0.4 MG/ML
0.2 INJECTION, SOLUTION INTRAMUSCULAR; INTRAVENOUS; SUBCUTANEOUS
Status: DISCONTINUED | OUTPATIENT
Start: 2023-08-08 | End: 2023-08-08 | Stop reason: HOSPADM

## 2023-08-08 RX ORDER — MEPERIDINE HYDROCHLORIDE 25 MG/ML
25 INJECTION INTRAMUSCULAR; INTRAVENOUS; SUBCUTANEOUS EVERY 30 MIN PRN
Status: DISCONTINUED | OUTPATIENT
Start: 2023-08-08 | End: 2023-08-08 | Stop reason: HOSPADM

## 2023-08-08 RX ORDER — METHYLPREDNISOLONE SODIUM SUCCINATE 125 MG/2ML
125 INJECTION, POWDER, LYOPHILIZED, FOR SOLUTION INTRAMUSCULAR; INTRAVENOUS
Status: DISCONTINUED | OUTPATIENT
Start: 2023-08-08 | End: 2023-08-08 | Stop reason: HOSPADM

## 2023-08-08 RX ORDER — DIPHENHYDRAMINE HYDROCHLORIDE 50 MG/ML
50 INJECTION INTRAMUSCULAR; INTRAVENOUS
Status: DISCONTINUED | OUTPATIENT
Start: 2023-08-08 | End: 2023-08-08 | Stop reason: HOSPADM

## 2023-08-08 RX ORDER — HEPARIN SODIUM,PORCINE 10 UNIT/ML
5 VIAL (ML) INTRAVENOUS
Status: DISCONTINUED | OUTPATIENT
Start: 2023-08-08 | End: 2023-08-08 | Stop reason: HOSPADM

## 2023-08-08 NOTE — TELEPHONE ENCOUNTER
Disability paperwork received via fax from [employer]. Will be placed in provider folder for signature upon completion.     Fax: [phone]      Kinga Berry

## 2023-08-09 ENCOUNTER — LAB REQUISITION (OUTPATIENT)
Dept: LAB | Facility: CLINIC | Age: 58
End: 2023-08-09

## 2023-08-09 ENCOUNTER — PATIENT OUTREACH (OUTPATIENT)
Dept: ONCOLOGY | Facility: CLINIC | Age: 58
End: 2023-08-09
Payer: COMMERCIAL

## 2023-08-09 ENCOUNTER — NURSE TRIAGE (OUTPATIENT)
Dept: ONCOLOGY | Facility: CLINIC | Age: 58
End: 2023-08-09
Payer: COMMERCIAL

## 2023-08-09 DIAGNOSIS — Z01.818 ENCOUNTER FOR OTHER PREPROCEDURAL EXAMINATION: ICD-10-CM

## 2023-08-09 LAB
ANION GAP SERPL CALCULATED.3IONS-SCNC: 11 MMOL/L (ref 7–15)
BUN SERPL-MCNC: 16.1 MG/DL (ref 8–23)
CALCIUM SERPL-MCNC: 9.5 MG/DL (ref 8.6–10)
CHLORIDE SERPL-SCNC: 103 MMOL/L (ref 98–107)
CREAT SERPL-MCNC: 1.36 MG/DL (ref 0.67–1.17)
DEPRECATED HCO3 PLAS-SCNC: 23 MMOL/L (ref 22–29)
GFR SERPL CREATININE-BSD FRML MDRD: 60 ML/MIN/1.73M2
GLUCOSE SERPL-MCNC: 116 MG/DL (ref 70–99)
POTASSIUM SERPL-SCNC: 4.3 MMOL/L (ref 3.4–5.3)
SODIUM SERPL-SCNC: 137 MMOL/L (ref 136–145)

## 2023-08-09 PROCEDURE — 80048 BASIC METABOLIC PNL TOTAL CA: CPT | Performed by: NURSE PRACTITIONER

## 2023-08-09 NOTE — TELEPHONE ENCOUNTER
Call from Yeimy nurse from [facility], who states she is at pt's home administering IVIG and as she was titrating the rate up from 175-200, about 15 minutes later pt started to develop a headache and BP darryl to 156/93 (had been running 130/802). He had premeds of Benadryl and Tylenol. Infusion was stopped per protocol and NS and Benadryl administered. He has about 40 minutes of IVIG left and is disconnected- will not get full dose today unless doctor wants it hooked back up at a slower rate.   Wondering if in future should be administer at a slower rate?   Pt overheard in background saying headache is just about gone and BP after stopping infusion was 149/77.    Dr. Terry paged at [phone] Return call from Dr. Terry, who recommends infusion nurse continue infusion at a slower rate, recommended max rate be 175.     Encounter routed to ANNIE Mccord, and Dr. Terry.

## 2023-08-09 NOTE — PROGRESS NOTES
"[facility]: Cancer Care Short Note                                    Discussion with Patient:                                                        RNCC received IB call from [facility] nurse. She is at patient's house and ready to administer IVIG. She reports that he has a cold, is on PO abx, and has a shoulder surgery coming up. Wondering if it is ok to pursue IVIG.   States the cold is same cold he had last week when seeing MD-- denies new or worsening symptoms (no chills, fevers, SOB, etc).   Per MD note \"Another cold and cough started 4 days ago, he was given antibiotics by his primary MD. He thinks IVIG was helping. No fevers or chills, no SOB, but he is coughing\". Pt has also been in contact with MD via [employer] who has agreed that patient is ok from onc perspective to get shoulder surgery.   Ok to pursue IVIG. Pt given strict instructions to call triage line with new or worsening symptoms.     Suri Landa, RN, MSN, OCN   RN Care Coordinator   [facility]   [address] [place] [address]   [phone]     "

## 2023-08-17 NOTE — TELEPHONE ENCOUNTER
Signed form received and faxed to [employer], fax # [phone]. Successful transmission verified via rightfax. Copy of forms sent to scanning, original forms mailed to patient's home address on file.    Radha Parikh CMA on 8/17/2023 at 7:45 AM

## 2023-08-24 ENCOUNTER — MYC MEDICAL ADVICE (OUTPATIENT)
Dept: TRANSPLANT | Facility: CLINIC | Age: 58
End: 2023-08-24
Payer: COMMERCIAL

## 2023-08-28 ENCOUNTER — LAB REQUISITION (OUTPATIENT)
Dept: LAB | Facility: CLINIC | Age: 58
End: 2023-08-28

## 2023-08-28 DIAGNOSIS — Z01.818 ENCOUNTER FOR OTHER PREPROCEDURAL EXAMINATION: ICD-10-CM

## 2023-08-28 LAB
ANION GAP SERPL CALCULATED.3IONS-SCNC: 13 MMOL/L (ref 7–15)
BUN SERPL-MCNC: 19 MG/DL (ref 8–23)
CALCIUM SERPL-MCNC: 9.4 MG/DL (ref 8.6–10)
CHLORIDE SERPL-SCNC: 101 MMOL/L (ref 98–107)
CREAT SERPL-MCNC: 1.33 MG/DL (ref 0.67–1.17)
DEPRECATED HCO3 PLAS-SCNC: 26 MMOL/L (ref 22–29)
GFR SERPL CREATININE-BSD FRML MDRD: 62 ML/MIN/1.73M2
GLUCOSE SERPL-MCNC: 107 MG/DL (ref 70–99)
POTASSIUM SERPL-SCNC: 4.6 MMOL/L (ref 3.4–5.3)
SODIUM SERPL-SCNC: 140 MMOL/L (ref 136–145)

## 2023-08-28 PROCEDURE — 80048 BASIC METABOLIC PNL TOTAL CA: CPT | Performed by: FAMILY MEDICINE

## 2023-08-30 DIAGNOSIS — Z85.72 HISTORY OF LYMPHOMA: Primary | ICD-10-CM

## 2023-08-30 DIAGNOSIS — C83.398 DIFFUSE LARGE B-CELL LYMPHOMA OF EXTRANODAL SITE EXCLUDING SPLEEN AND OTHER SOLID ORGANS: ICD-10-CM

## 2023-08-30 PROCEDURE — 94642 AEROSOL INHALATION TREATMENT: CPT | Performed by: INTERNAL MEDICINE

## 2023-08-30 PROCEDURE — 94640 AIRWAY INHALATION TREATMENT: CPT | Performed by: INTERNAL MEDICINE

## 2023-08-30 RX ORDER — ALBUTEROL SULFATE 90 UG/1
1-2 AEROSOL, METERED RESPIRATORY (INHALATION)
Status: CANCELLED
Start: 2023-09-19

## 2023-08-30 RX ORDER — ALBUTEROL SULFATE 0.83 MG/ML
2.5 SOLUTION RESPIRATORY (INHALATION) ONCE
Status: CANCELLED
Start: 2023-09-19 | End: 2023-09-19

## 2023-08-30 RX ORDER — MEPERIDINE HYDROCHLORIDE 25 MG/ML
25 INJECTION INTRAMUSCULAR; INTRAVENOUS; SUBCUTANEOUS EVERY 30 MIN PRN
Status: CANCELLED | OUTPATIENT
Start: 2023-09-19

## 2023-08-30 RX ORDER — DIPHENHYDRAMINE HYDROCHLORIDE 50 MG/ML
50 INJECTION INTRAMUSCULAR; INTRAVENOUS
Status: CANCELLED
Start: 2023-09-19

## 2023-08-30 RX ORDER — PENTAMIDINE ISETHIONATE 300 MG/300MG
300 INHALANT RESPIRATORY (INHALATION) ONCE
Status: COMPLETED | OUTPATIENT
Start: 2023-08-30 | End: 2023-08-30

## 2023-08-30 RX ORDER — EPINEPHRINE 1 MG/ML
0.3 INJECTION, SOLUTION, CONCENTRATE INTRAVENOUS EVERY 5 MIN PRN
Status: CANCELLED | OUTPATIENT
Start: 2023-09-19

## 2023-08-30 RX ORDER — ALBUTEROL SULFATE 0.83 MG/ML
2.5 SOLUTION RESPIRATORY (INHALATION)
Status: CANCELLED | OUTPATIENT
Start: 2023-09-19

## 2023-08-30 RX ORDER — PENTAMIDINE ISETHIONATE 300 MG/300MG
300 INHALANT RESPIRATORY (INHALATION) ONCE
Status: CANCELLED
Start: 2023-09-19 | End: 2023-09-19

## 2023-08-30 RX ORDER — METHYLPREDNISOLONE SODIUM SUCCINATE 125 MG/2ML
125 INJECTION, POWDER, LYOPHILIZED, FOR SOLUTION INTRAMUSCULAR; INTRAVENOUS
Status: CANCELLED
Start: 2023-09-19

## 2023-08-30 RX ADMIN — PENTAMIDINE ISETHIONATE 300 MG: 300 INHALANT RESPIRATORY (INHALATION) at 15:41

## 2023-08-30 NOTE — PROGRESS NOTES
Juvenal Blake  Patient was seen today for a Pentamidine nebulizer tx ordered by Dr. Terry.    Patient was first given 4 puffs Albuterol MDI, after which Pentamidine 300 mg (Lot # [id number]) mixed with 6cc Sterile H20 was administered through a filtered nebulizer.    Pre-treatment: SpO2 = 95%   HR = 63   BBS = clear   Post-treatment: SpO2 = 95%  HR = 64  BBS = clear    No adverse side effects noted by the patient.    This service today was provided by Dr. Vanessa, who was available if needed.     Procedure was completed by HUYEN GARZA.

## 2023-09-06 ENCOUNTER — DOCUMENTATION ONLY (OUTPATIENT)
Dept: PHARMACY | Facility: CLINIC | Age: 58
End: 2023-09-06
Payer: COMMERCIAL

## 2023-09-13 ENCOUNTER — LAB (OUTPATIENT)
Dept: LAB | Facility: CLINIC | Age: 58
End: 2023-09-13
Payer: COMMERCIAL

## 2023-09-13 DIAGNOSIS — D80.1 HYPOGAMMAGLOBULINEMIA (H): ICD-10-CM

## 2023-09-13 LAB
BASOPHILS # BLD AUTO: 0 10E3/UL (ref 0–0.2)
BASOPHILS NFR BLD AUTO: 0 %
EOSINOPHIL # BLD AUTO: 0.1 10E3/UL (ref 0–0.7)
EOSINOPHIL NFR BLD AUTO: 2 %
ERYTHROCYTE [DISTWIDTH] IN BLOOD BY AUTOMATED COUNT: 13.2 % (ref 10–15)
HCT VFR BLD AUTO: 34.3 % (ref 40–53)
HGB BLD-MCNC: 11.8 G/DL (ref 13.3–17.7)
IMM GRANULOCYTES # BLD: 0 10E3/UL
IMM GRANULOCYTES NFR BLD: 0 %
LYMPHOCYTES # BLD AUTO: 0.6 10E3/UL (ref 0.8–5.3)
LYMPHOCYTES NFR BLD AUTO: 14 %
MCH RBC QN AUTO: 32.7 PG (ref 26.5–33)
MCHC RBC AUTO-ENTMCNC: 34.4 G/DL (ref 31.5–36.5)
MCV RBC AUTO: 95 FL (ref 78–100)
MONOCYTES # BLD AUTO: 0.3 10E3/UL (ref 0–1.3)
MONOCYTES NFR BLD AUTO: 7 %
NEUTROPHILS # BLD AUTO: 3.5 10E3/UL (ref 1.6–8.3)
NEUTROPHILS NFR BLD AUTO: 77 %
NRBC # BLD AUTO: 0 10E3/UL
NRBC BLD AUTO-RTO: 0 /100
PLATELET # BLD AUTO: 111 10E3/UL (ref 150–450)
RBC # BLD AUTO: 3.61 10E6/UL (ref 4.4–5.9)
WBC # BLD AUTO: 4.6 10E3/UL (ref 4–11)

## 2023-09-13 PROCEDURE — 36591 DRAW BLOOD OFF VENOUS DEVICE: CPT

## 2023-09-13 PROCEDURE — 250N000011 HC RX IP 250 OP 636: Mod: JZ

## 2023-09-13 PROCEDURE — 82784 ASSAY IGA/IGD/IGG/IGM EACH: CPT

## 2023-09-13 PROCEDURE — 85025 COMPLETE CBC W/AUTO DIFF WBC: CPT

## 2023-09-13 RX ORDER — HEPARIN SODIUM (PORCINE) LOCK FLUSH IV SOLN 100 UNIT/ML 100 UNIT/ML
5 SOLUTION INTRAVENOUS ONCE
Status: COMPLETED | OUTPATIENT
Start: 2023-09-13 | End: 2023-09-13

## 2023-09-13 RX ADMIN — Medication 5 ML: at 13:44

## 2023-09-13 NOTE — NURSING NOTE
"Chief Complaint   Patient presents with    Port Draw     Labs drawn via port by RN.      Labs drawn via port by RN. Port accessed with 20G 3/4\" power needle. Flushed with NS and heparin. Pt tolerated well. De-accessed.    Suri Alfaro RN  "

## 2023-09-14 LAB — IGG SERPL-MCNC: 709 MG/DL (ref 610–1616)

## 2023-09-18 NOTE — PROGRESS NOTES
Skilled Nurse visit in the patient's home to administer Privigen 35g via port. No recent elevated temperature, fever, chills, productive cough, coughing for 3 weeks or longer or hemoptysis, abnormal vital signs, night sweats, chest pain. No decrease in pt's appetite, unexplained weight loss or fatigue. Current weight 230lb. Pre medicated with Tylenol 650mg and Benadryl 50mg at 0830. Infusion completed with no complications or reaction at new max titration rate of 175ml/hr.     Isha COLONN RN  [facility]fusion Field Nurse  [phone]

## 2023-09-19 DIAGNOSIS — C82.00 FOLLICULAR LYMPHOMA GRADE I, UNSPECIFIED BODY REGION (H): ICD-10-CM

## 2023-09-19 DIAGNOSIS — C82.08 FOLLICULAR LYMPHOMA GRADE I, LYMPH NODES OF MULTIPLE SITES (H): Primary | ICD-10-CM

## 2023-09-19 RX ORDER — HEPARIN SODIUM,PORCINE 10 UNIT/ML
5 VIAL (ML) INTRAVENOUS
Status: CANCELLED | OUTPATIENT
Start: 2023-09-19

## 2023-09-19 RX ORDER — HEPARIN SODIUM (PORCINE) LOCK FLUSH IV SOLN 100 UNIT/ML 100 UNIT/ML
5 SOLUTION INTRAVENOUS
Status: CANCELLED | OUTPATIENT
Start: 2023-09-19

## 2023-09-27 DIAGNOSIS — C83.398 DIFFUSE LARGE B-CELL LYMPHOMA OF EXTRANODAL SITE EXCLUDING SPLEEN AND OTHER SOLID ORGANS: ICD-10-CM

## 2023-09-27 DIAGNOSIS — Z85.72 HISTORY OF LYMPHOMA: Primary | ICD-10-CM

## 2023-09-27 PROCEDURE — 94640 AIRWAY INHALATION TREATMENT: CPT

## 2023-09-27 PROCEDURE — 94642 AEROSOL INHALATION TREATMENT: CPT

## 2023-09-27 RX ORDER — MEPERIDINE HYDROCHLORIDE 25 MG/ML
25 INJECTION INTRAMUSCULAR; INTRAVENOUS; SUBCUTANEOUS EVERY 30 MIN PRN
Status: CANCELLED | OUTPATIENT
Start: 2023-10-17

## 2023-09-27 RX ORDER — PENTAMIDINE ISETHIONATE 300 MG/300MG
300 INHALANT RESPIRATORY (INHALATION) ONCE
Status: COMPLETED | OUTPATIENT
Start: 2023-09-27 | End: 2023-09-27

## 2023-09-27 RX ORDER — DIPHENHYDRAMINE HYDROCHLORIDE 50 MG/ML
50 INJECTION INTRAMUSCULAR; INTRAVENOUS
Status: CANCELLED
Start: 2023-10-17

## 2023-09-27 RX ORDER — METHYLPREDNISOLONE SODIUM SUCCINATE 125 MG/2ML
125 INJECTION, POWDER, LYOPHILIZED, FOR SOLUTION INTRAMUSCULAR; INTRAVENOUS
Status: CANCELLED
Start: 2023-10-17

## 2023-09-27 RX ORDER — ALBUTEROL SULFATE 0.83 MG/ML
2.5 SOLUTION RESPIRATORY (INHALATION) ONCE
Status: CANCELLED
Start: 2023-10-17 | End: 2023-10-17

## 2023-09-27 RX ORDER — PENTAMIDINE ISETHIONATE 300 MG/300MG
300 INHALANT RESPIRATORY (INHALATION) ONCE
Status: CANCELLED
Start: 2023-10-17 | End: 2023-10-17

## 2023-09-27 RX ORDER — EPINEPHRINE 1 MG/ML
0.3 INJECTION, SOLUTION, CONCENTRATE INTRAVENOUS EVERY 5 MIN PRN
Status: CANCELLED | OUTPATIENT
Start: 2023-10-17

## 2023-09-27 RX ORDER — ALBUTEROL SULFATE 0.83 MG/ML
2.5 SOLUTION RESPIRATORY (INHALATION)
Status: CANCELLED | OUTPATIENT
Start: 2023-10-17

## 2023-09-27 RX ORDER — ALBUTEROL SULFATE 90 UG/1
1-2 AEROSOL, METERED RESPIRATORY (INHALATION)
Status: CANCELLED
Start: 2023-10-17

## 2023-09-27 RX ADMIN — PENTAMIDINE ISETHIONATE 300 MG: 300 INHALANT RESPIRATORY (INHALATION) at 15:38

## 2023-09-27 NOTE — PROGRESS NOTES
Juvenal Blake   Patient was seen today for a Pentamidine nebulizer tx ordered by Dr. Terry.    Patient was first given 4 puffs Albuterol MDI, after which Pentamidine 300 mg (Lot # [id number]) mixed with 6cc Sterile H20 was administered through a filtered nebulizer.    Pre-treatment: SpO2 = 95%   HR = 65   BBS = clear   Post-treatment: SpO2 = 96%  HR = 67  BBS = clear    No adverse side effects noted by the patient.    This service today was provided by Dr. Bermudez, who was available if needed.     Procedure was completed by HUYEN GARZA.

## 2023-10-04 ENCOUNTER — PATIENT OUTREACH (OUTPATIENT)
Dept: ONCOLOGY | Facility: CLINIC | Age: 58
End: 2023-10-04
Payer: COMMERCIAL

## 2023-10-04 NOTE — PROGRESS NOTES
[facility]: Cancer Care Short Note                                    Discussion with Patient:                                                      INBOUND CALL: VM received from [facility]. Were not able to draw IGG and do pot flush today. Requesting call to patient.     OUTBOUND CALL: RNCC called patient. [facility] not tamika to draw labs/port flush today due to having wrong needle.   RNCC educated pt that port needs to be flushed every 4-6 weeks per guidelines. Pt will be in on 10/25 and we cna plan to check IGG and flush then. Will send message to scheduling.   Last IGG was 709--will give IVIG if <300.     Intervention/Education provided during outreach:                                                       Patient to follow up as scheduled at next appt  Patient to call/[employer] with updates  Confirmed patient has clinic and triage numbers    Patient verbalizes understanding of POC and has no other questions or concerns at this time.     Suri Landa, RN, MSN, OCN   RN Care Coordinator   [facility]   [address] [place] [address] [phone]

## 2023-10-14 ENCOUNTER — IMMUNIZATION (OUTPATIENT)
Dept: FAMILY MEDICINE | Facility: CLINIC | Age: 58
End: 2023-10-14
Payer: COMMERCIAL

## 2023-10-14 PROCEDURE — 90480 ADMN SARSCOV2 VAC 1/ONLY CMP: CPT

## 2023-10-14 PROCEDURE — 90471 IMMUNIZATION ADMIN: CPT

## 2023-10-14 PROCEDURE — 90686 IIV4 VACC NO PRSV 0.5 ML IM: CPT

## 2023-10-14 PROCEDURE — 91320 SARSCV2 VAC 30MCG TRS-SUC IM: CPT

## 2023-10-25 ENCOUNTER — LAB (OUTPATIENT)
Dept: LAB | Facility: CLINIC | Age: 58
End: 2023-10-25
Payer: COMMERCIAL

## 2023-10-25 DIAGNOSIS — C83.398 DIFFUSE LARGE B-CELL LYMPHOMA OF EXTRANODAL SITE EXCLUDING SPLEEN AND OTHER SOLID ORGANS: ICD-10-CM

## 2023-10-25 DIAGNOSIS — D80.1 HYPOGAMMAGLOBULINEMIA (H): ICD-10-CM

## 2023-10-25 DIAGNOSIS — Z85.72 HISTORY OF LYMPHOMA: Primary | ICD-10-CM

## 2023-10-25 PROCEDURE — 94642 AEROSOL INHALATION TREATMENT: CPT | Performed by: INTERNAL MEDICINE

## 2023-10-25 PROCEDURE — 82784 ASSAY IGA/IGD/IGG/IGM EACH: CPT

## 2023-10-25 PROCEDURE — 94640 AIRWAY INHALATION TREATMENT: CPT | Performed by: INTERNAL MEDICINE

## 2023-10-25 PROCEDURE — 250N000011 HC RX IP 250 OP 636: Mod: JZ

## 2023-10-25 PROCEDURE — 36591 DRAW BLOOD OFF VENOUS DEVICE: CPT

## 2023-10-25 RX ORDER — MEPERIDINE HYDROCHLORIDE 25 MG/ML
25 INJECTION INTRAMUSCULAR; INTRAVENOUS; SUBCUTANEOUS EVERY 30 MIN PRN
Status: CANCELLED | OUTPATIENT
Start: 2023-11-14

## 2023-10-25 RX ORDER — ALBUTEROL SULFATE 0.83 MG/ML
2.5 SOLUTION RESPIRATORY (INHALATION) ONCE
Status: CANCELLED
Start: 2023-11-14 | End: 2023-11-14

## 2023-10-25 RX ORDER — EPINEPHRINE 1 MG/ML
0.3 INJECTION, SOLUTION, CONCENTRATE INTRAVENOUS EVERY 5 MIN PRN
Status: CANCELLED | OUTPATIENT
Start: 2023-11-14

## 2023-10-25 RX ORDER — PENTAMIDINE ISETHIONATE 300 MG/300MG
300 INHALANT RESPIRATORY (INHALATION) ONCE
Status: COMPLETED | OUTPATIENT
Start: 2023-10-25 | End: 2023-10-25

## 2023-10-25 RX ORDER — PENTAMIDINE ISETHIONATE 300 MG/300MG
300 INHALANT RESPIRATORY (INHALATION) ONCE
Status: CANCELLED
Start: 2023-11-14 | End: 2023-11-14

## 2023-10-25 RX ORDER — METHYLPREDNISOLONE SODIUM SUCCINATE 125 MG/2ML
125 INJECTION, POWDER, LYOPHILIZED, FOR SOLUTION INTRAMUSCULAR; INTRAVENOUS
Status: CANCELLED
Start: 2023-11-14

## 2023-10-25 RX ORDER — ALBUTEROL SULFATE 0.83 MG/ML
2.5 SOLUTION RESPIRATORY (INHALATION)
Status: CANCELLED | OUTPATIENT
Start: 2023-11-14

## 2023-10-25 RX ORDER — DIPHENHYDRAMINE HYDROCHLORIDE 50 MG/ML
50 INJECTION INTRAMUSCULAR; INTRAVENOUS
Status: CANCELLED
Start: 2023-11-14

## 2023-10-25 RX ORDER — HEPARIN SODIUM (PORCINE) LOCK FLUSH IV SOLN 100 UNIT/ML 100 UNIT/ML
5 SOLUTION INTRAVENOUS ONCE
Status: COMPLETED | OUTPATIENT
Start: 2023-10-25 | End: 2023-10-25

## 2023-10-25 RX ORDER — ALBUTEROL SULFATE 90 UG/1
1-2 AEROSOL, METERED RESPIRATORY (INHALATION)
Status: CANCELLED
Start: 2023-11-14

## 2023-10-25 RX ADMIN — PENTAMIDINE ISETHIONATE 300 MG: 300 INHALANT RESPIRATORY (INHALATION) at 15:18

## 2023-10-25 RX ADMIN — Medication 5 ML: at 14:53

## 2023-10-25 NOTE — PROGRESS NOTES
Juvenal Blake was seen today for a Pentamidine nebulizer tx ordered by Dr. Rosa Terry.    Patient was first given 4 puffs of albuterol MDI with spacer (due to albuterol nebulizer shortage), after which Pentamidine 300 mg (Lot # [id number], NDC# [id number], EXP 04/2025) mixed with 6cc Sterile H20 was administered through a filtered nebulizer.    Pre-treatment: SpO2 = 99% HR = 65 bpm   BBS = RUL crackles  Post-treatment: SpO2 = 98%  HR = 70 bpm  BBS = RUL crackles    Coughing noted during treatment  No adverse side effects noted by the patient.    This service today was provided under the supervision of Dr. Last Hoffman, who was available if needed.     Procedure was completed by Sang Snow RRT

## 2023-10-25 NOTE — NURSING NOTE
Chief Complaint   Patient presents with    Port Draw     Port accessed with 20 gauge 3/4 inch gripper needle by RN, labs collected, line flushed with saline and heparin.  Vitals taken. Pt checked in for appointment(s).       Jessica Hendricks RN

## 2023-10-26 ENCOUNTER — ALLIED HEALTH/NURSE VISIT (OUTPATIENT)
Dept: TRANSPLANT | Facility: CLINIC | Age: 58
End: 2023-10-26
Payer: COMMERCIAL

## 2023-10-26 DIAGNOSIS — C82.08 FOLLICULAR LYMPHOMA GRADE I, LYMPH NODES OF MULTIPLE SITES (H): Primary | ICD-10-CM

## 2023-10-26 LAB — IGG SERPL-MCNC: 434 MG/DL (ref 610–1616)

## 2023-10-26 NOTE — PROGRESS NOTES
MT2021-16: Withdrawal of Consent    JELLY Lyn called the patient and notified him that he was inadvertently consented to MT2021-16. I answered any questions he had to his satisfaction and let him know that he could ask his care team if additional questions arose. He was notified that he continues on MT2019-25 and that data from MT2021-16 will be destroyed. No samples were collected for MT2021-16. He chooses not to withdraw consent from MT2019-25 and he was informed that data will continue to be collected for MT2019-25.

## 2023-11-08 ENCOUNTER — ONCOLOGY VISIT (OUTPATIENT)
Dept: ONCOLOGY | Facility: CLINIC | Age: 58
End: 2023-11-08
Payer: COMMERCIAL

## 2023-11-08 ENCOUNTER — APPOINTMENT (OUTPATIENT)
Dept: LAB | Facility: CLINIC | Age: 58
End: 2023-11-08
Payer: COMMERCIAL

## 2023-11-08 VITALS
DIASTOLIC BLOOD PRESSURE: 74 MMHG | TEMPERATURE: 98.3 F | OXYGEN SATURATION: 98 % | HEIGHT: 68 IN | HEART RATE: 61 BPM | RESPIRATION RATE: 16 BRPM | BODY MASS INDEX: 35.01 KG/M2 | SYSTOLIC BLOOD PRESSURE: 133 MMHG | WEIGHT: 231 LBS

## 2023-11-08 DIAGNOSIS — C82.08 FOLLICULAR LYMPHOMA GRADE I, LYMPH NODES OF MULTIPLE SITES (H): Primary | ICD-10-CM

## 2023-11-08 DIAGNOSIS — D80.1 HYPOGAMMAGLOBULINEMIA (H): ICD-10-CM

## 2023-11-08 LAB
ALBUMIN SERPL BCG-MCNC: 4.4 G/DL (ref 3.5–5.2)
ALP SERPL-CCNC: 71 U/L (ref 40–129)
ALT SERPL W P-5'-P-CCNC: 24 U/L (ref 0–70)
ANION GAP SERPL CALCULATED.3IONS-SCNC: 10 MMOL/L (ref 7–15)
AST SERPL W P-5'-P-CCNC: 23 U/L (ref 0–45)
BASOPHILS # BLD AUTO: 0 10E3/UL (ref 0–0.2)
BASOPHILS NFR BLD AUTO: 0 %
BILIRUB SERPL-MCNC: 0.3 MG/DL
BUN SERPL-MCNC: 13.8 MG/DL (ref 8–23)
CALCIUM SERPL-MCNC: 9.5 MG/DL (ref 8.8–10.2)
CHLORIDE SERPL-SCNC: 104 MMOL/L (ref 98–107)
CREAT SERPL-MCNC: 1.33 MG/DL (ref 0.67–1.17)
DEPRECATED HCO3 PLAS-SCNC: 25 MMOL/L (ref 22–29)
EGFRCR SERPLBLD CKD-EPI 2021: 61 ML/MIN/1.73M2
EOSINOPHIL # BLD AUTO: 0.1 10E3/UL (ref 0–0.7)
EOSINOPHIL NFR BLD AUTO: 2 %
ERYTHROCYTE [DISTWIDTH] IN BLOOD BY AUTOMATED COUNT: 14.4 % (ref 10–15)
GLUCOSE SERPL-MCNC: 96 MG/DL (ref 70–99)
HCT VFR BLD AUTO: 36 % (ref 40–53)
HGB BLD-MCNC: 12.6 G/DL (ref 13.3–17.7)
IMM GRANULOCYTES # BLD: 0 10E3/UL
IMM GRANULOCYTES NFR BLD: 0 %
LDH SERPL L TO P-CCNC: 158 U/L (ref 0–250)
LYMPHOCYTES # BLD AUTO: 0.9 10E3/UL (ref 0.8–5.3)
LYMPHOCYTES NFR BLD AUTO: 21 %
MCH RBC QN AUTO: 32.4 PG (ref 26.5–33)
MCHC RBC AUTO-ENTMCNC: 35 G/DL (ref 31.5–36.5)
MCV RBC AUTO: 93 FL (ref 78–100)
MONOCYTES # BLD AUTO: 0.4 10E3/UL (ref 0–1.3)
MONOCYTES NFR BLD AUTO: 10 %
NEUTROPHILS # BLD AUTO: 2.9 10E3/UL (ref 1.6–8.3)
NEUTROPHILS NFR BLD AUTO: 67 %
NRBC # BLD AUTO: 0 10E3/UL
NRBC BLD AUTO-RTO: 0 /100
PLATELET # BLD AUTO: 102 10E3/UL (ref 150–450)
POTASSIUM SERPL-SCNC: 3.8 MMOL/L (ref 3.4–5.3)
PROT SERPL-MCNC: 6.4 G/DL (ref 6.4–8.3)
RBC # BLD AUTO: 3.89 10E6/UL (ref 4.4–5.9)
SODIUM SERPL-SCNC: 139 MMOL/L (ref 135–145)
WBC # BLD AUTO: 4.3 10E3/UL (ref 4–11)

## 2023-11-08 PROCEDURE — 85025 COMPLETE CBC W/AUTO DIFF WBC: CPT | Performed by: REGISTERED NURSE

## 2023-11-08 PROCEDURE — 99213 OFFICE O/P EST LOW 20 MIN: CPT | Performed by: REGISTERED NURSE

## 2023-11-08 PROCEDURE — 80053 COMPREHEN METABOLIC PANEL: CPT | Performed by: REGISTERED NURSE

## 2023-11-08 PROCEDURE — 99214 OFFICE O/P EST MOD 30 MIN: CPT | Performed by: REGISTERED NURSE

## 2023-11-08 PROCEDURE — 250N000011 HC RX IP 250 OP 636: Mod: JZ

## 2023-11-08 PROCEDURE — 83615 LACTATE (LD) (LDH) ENZYME: CPT | Performed by: REGISTERED NURSE

## 2023-11-08 PROCEDURE — 36591 DRAW BLOOD OFF VENOUS DEVICE: CPT | Performed by: REGISTERED NURSE

## 2023-11-08 RX ORDER — HEPARIN SODIUM (PORCINE) LOCK FLUSH IV SOLN 100 UNIT/ML 100 UNIT/ML
5 SOLUTION INTRAVENOUS ONCE
Status: COMPLETED | OUTPATIENT
Start: 2023-11-08 | End: 2023-11-08

## 2023-11-08 RX ADMIN — Medication 5 ML: at 16:07

## 2023-11-08 ASSESSMENT — PAIN SCALES - GENERAL: PAINLEVEL: SEVERE PAIN (6)

## 2023-11-08 NOTE — NURSING NOTE
"Oncology Rooming Note    November 8, 2023 4:22 PM   Juvenal Blake is a 58 year old male who presents for:    Chief Complaint   Patient presents with    Port Draw     Port accessed with 20 gauge 3/4 inch gripper needle by RN, labs collected, line flushed with saline and heparin.  Vitals taken. Pt checked in for appointment(s).     Oncology Clinic Visit     [facility] RETURN - FOLLICULAR LYMPHOMA      Initial Vitals: /74   Pulse 61   Temp 98.3  F (36.8  C)   Resp 16   Ht 1.715 m (5' 7.52\")   Wt 104.8 kg (231 lb)   SpO2 98%   BMI 35.62 kg/m   Estimated body mass index is 35.62 kg/m  as calculated from the following:    Height as of this encounter: 1.715 m (5' 7.52\").    Weight as of this encounter: 104.8 kg (231 lb). Body surface area is 2.23 meters squared.  Severe Pain (6) Comment: Data Unavailable   No LMP for male patient.  Allergies reviewed: Yes  Medications reviewed: Yes    Medications: Medication refills not needed today.  Pharmacy name entered into [employer]:    [id number] - [place], [place] - [address] AT [facility] - [place] - [address] [place] [address]    Mustapha Stuart Wellington LPN              "

## 2023-11-08 NOTE — PROGRESS NOTES
Reason for Visit: follicular lymphoma, surveillance visit 1 year 9 months s/p CAR-T therapy    Oncology HPI:   Juvenal Blake is a 58 year old male with multiply relapsed follicular B-cell lymphoma. Now in CR following Yescarta therapy 1/17/22.    Interval history:   Juvenal reports doing well overall.  Energy and appetite are both slightly low but stable, no recent changes.  Weight is stable.  No new lumps or bumps. He has not had a recent URI.  Denies fevers, cough, sinus congestion. Denies n/v/d/c.     ROS: 10 point ROS neg other than the symptoms noted above in the HPI.        Current Outpatient Medications   Medication Sig Dispense Refill    acyclovir (ZOVIRAX) 800 MG tablet Take 1 tablet (800 mg) by mouth every 12 hours 180 tablet 1    albuterol (PROAIR HFA/PROVENTIL HFA/VENTOLIN HFA) 108 (90 Base) MCG/ACT inhaler Inhale 2 puffs into the lungs every 6 hours      beclomethasone HFA (QVAR REDIHALER) 80 MCG/ACT inhaler INHALE 1 PUFF BY MOUTH TWICE DAILY WHEN FOR SICK      benzonatate (TESSALON) 100 MG capsule       Calcium Polycarbophil (FIBER-CAPS PO) Take 2 capsules by mouth daily      fluticasone (FLONASE) 50 MCG/ACT nasal spray USE 2 SPRAYS PER NOSTRIL ONCE A DAY FOR NASAL CONGESTION. 14 DAYS      loratadine (CLARITIN) 10 MG tablet TAKE 1 TABLET BY MOUTH EVERY DAY FOR ALLERGIES      medical cannabis (Patient's own supply) 1 Dose daily as needed (The purpose of this order is to document that the patient reports taking medical cannabis)      multivitamin w/minerals (THERA-VIT-M) tablet Take 1 tablet by mouth daily      omeprazole (PRILOSEC) 20 MG DR capsule TAKE 2 CAPSULES(40 MG) BY MOUTH DAILY 180 capsule 3    sulfamethoxazole-trimethoprim (BACTRIM DS) 800-160 MG tablet Take 1 tablet by mouth 2 times daily On Mo and Tue only 16 tablet 5    tolterodine ER (DETROL LA) 4 MG 24 hr capsule Take 1 capsule (4 mg) by mouth daily 90 capsule 11         Exam:   Blood pressure 133/74, pulse 61, temperature 98.3  F (36.8  " C), resp. rate 16, height 1.715 m (5' 7.52\"), weight 104.8 kg (231 lb), SpO2 98%.  Wt Readings from Last 4 Encounters:   11/08/23 104.8 kg (231 lb)   08/02/23 104.8 kg (231 lb)   05/02/23 105.5 kg (232 lb 9.6 oz)   03/03/23 105.1 kg (231 lb 11.2 oz)     Gen: alert, pleasant and conversational, NAD  HEENT: NC/AT,EOMI w/ PERRL, anicteric sclera. OP clear. MMM.   Neck: Supple, no LAD  CV: normal S1,S2 with RRR no m/r/g  Resp: lungs CTA bilaterally with adequate air movement to bases. No wheezes or crackles  Ext: warm and well perfused, +1 bilateral lower extremity edema  Lymphatics: no palpable cervical, axillary, or inguinal LAD. No HSM  Skin: no concerning lesions or rashes  Neuro: A&Ox4, no lateralizing sx. Grossly nonfocal.  Psych: appropriate, reactive    Labs:   I personally reviewed the following labs:    Most Recent 3 CBC's:  Recent Labs   Lab Test 11/08/23  1557 09/13/23  1349 08/02/23  1607   WBC 4.3 4.6 4.3   HGB 12.6* 11.8* 12.2*   MCV 93 95 97   * 111* 91*     Most Recent 3 BMP's:  Recent Labs   Lab Test 11/08/23  1557 08/28/23  1512 08/09/23  1102    140 137   POTASSIUM 3.8 4.6 4.3   CHLORIDE 104 101 103   CO2 25 26 23   BUN 13.8 19.0 16.1   CR 1.33* 1.33* 1.36*   ANIONGAP 10 13 11   TRE 9.5 9.4 9.5   GLC 96 107* 116*     Most Recent 2 LFT's:  Recent Labs   Lab Test 11/08/23  1557 08/02/23  1607   AST 23 25   ALT 24 32   ALKPHOS 71 75   BILITOTAL 0.3 0.5          Imaging:   No new imaging to review       Impression/plan:   Juvenal Blake is a 58 year old male with  refractory follicular lymphoma, 1 yr 9 mo after Yescarta CAR-T therapy.     1. Follicular Lymphoma/Yescarta CAR-T:    Relapsed after BR, OCHOP, NAM NK (9/1/20), idelalisib (~4-6/2020),  (6/28/21), polatuzumab (12/21/21). (hx Rituxan reaction).       Now s/p YESCARTa 1/17/22  In CR at 12 months. Clinical ongoing remission at 18 months.     Next restaging at 2 years     2. HEME: stable counts.    Platelets stable in the low " 100s  ANC recovered.        3. ID:  S/p Prevnar    Recevied COVID and flu vaccines fall 2023     Hypogammaglobulinemia - IVIG through [facility] for IgG < 300        #Prophy: acyclovir 800mg BID.   CD4 is still lowish 126-Continue PJP porphylaxis with Bactrim Mon, Tues     4. GI:    - omeprazole for acid reflux  - hx medical cannibis     5. Renal/FEN: Creat, lytes wnl.  Avoid NSAIDs.     6. CV:    History of stable angina and NSTEMI type II.  Evaluated by cardiology prior to transplant, CTA coronary artery and echo completed.    Carotid artery plaque - we asked to follow-up with PCP        7. : recc to r/u with urology re bladder wall thickening  #hx Prostatic adenocarcinoma: s/p radical prostatectomy and bilateral lymph node dissection 11/2017. Completed radiation to the prostate fossa and pelvic lymph nodes at [facility] early May 2018. He received 4500 cGy in 25 fractions. He then had 2340 cGy boost in 13 fractions to the prostatic fossa, for a total dose of 6240 cGy in 38 treatment fractions delivered over 54 days. He did not receive hormonal therapy:    - PSA 1.21 on 1/25/22, 0.71 on 9/10/21     MSK  Shoulder surgery in late August. Recovering well.  Continues with PT.     Plan:  2 years anniversary scheduled in Dec 2023       36 minutes spent on the date of the encounter doing chart review, review of test results, patient visit, and documentation       ALESSANDRO Ho CNP  [facility]  [address], [place] [address] [phone]

## 2023-11-08 NOTE — LETTER
11/8/2023         RE: Juvenal Blake  [address] [place] [address]        Dear Colleague,    Thank you for referring your patient, Juvenal Blake, to the [facility]. Please see a copy of my visit note below.    Reason for Visit: follicular lymphoma, surveillance visit 1 year 9 months s/p CAR-T therapy    Oncology HPI:   Juvenal Blake is a 58 year old male with multiply relapsed follicular B-cell lymphoma. Now in CR following Yescarta therapy 1/17/22.    Interval history:   Juvenal reports doing well overall.  Energy and appetite are both slightly low but stable, no recent changes.  Weight is stable.  No new lumps or bumps. He has not had a recent URI.  Denies fevers, cough, sinus congestion. Denies n/v/d/c.     ROS: 10 point ROS neg other than the symptoms noted above in the HPI.        Current Outpatient Medications   Medication Sig Dispense Refill    acyclovir (ZOVIRAX) 800 MG tablet Take 1 tablet (800 mg) by mouth every 12 hours 180 tablet 1    albuterol (PROAIR HFA/PROVENTIL HFA/VENTOLIN HFA) 108 (90 Base) MCG/ACT inhaler Inhale 2 puffs into the lungs every 6 hours      beclomethasone HFA (QVAR REDIHALER) 80 MCG/ACT inhaler INHALE 1 PUFF BY MOUTH TWICE DAILY WHEN FOR SICK      benzonatate (TESSALON) 100 MG capsule       Calcium Polycarbophil (FIBER-CAPS PO) Take 2 capsules by mouth daily      fluticasone (FLONASE) 50 MCG/ACT nasal spray USE 2 SPRAYS PER NOSTRIL ONCE A DAY FOR NASAL CONGESTION. 14 DAYS      loratadine (CLARITIN) 10 MG tablet TAKE 1 TABLET BY MOUTH EVERY DAY FOR ALLERGIES      medical cannabis (Patient's own supply) 1 Dose daily as needed (The purpose of this order is to document that the patient reports taking medical cannabis)      multivitamin w/minerals (THERA-VIT-M) tablet Take 1 tablet by mouth daily      omeprazole (PRILOSEC) 20 MG DR capsule TAKE 2 CAPSULES(40 MG) BY MOUTH DAILY 180 capsule 3    sulfamethoxazole-trimethoprim (BACTRIM DS) 800-160  "MG tablet Take 1 tablet by mouth 2 times daily On Mo and Tue only 16 tablet 5    tolterodine ER (DETROL LA) 4 MG 24 hr capsule Take 1 capsule (4 mg) by mouth daily 90 capsule 11         Exam:   Blood pressure 133/74, pulse 61, temperature 98.3  F (36.8  C), resp. rate 16, height 1.715 m (5' 7.52\"), weight 104.8 kg (231 lb), SpO2 98%.  Wt Readings from Last 4 Encounters:   11/08/23 104.8 kg (231 lb)   08/02/23 104.8 kg (231 lb)   05/02/23 105.5 kg (232 lb 9.6 oz)   03/03/23 105.1 kg (231 lb 11.2 oz)     Gen: alert, pleasant and conversational, NAD  HEENT: NC/AT,EOMI w/ PERRL, anicteric sclera. OP clear. MMM.   Neck: Supple, no LAD  CV: normal S1,S2 with RRR no m/r/g  Resp: lungs CTA bilaterally with adequate air movement to bases. No wheezes or crackles  Ext: warm and well perfused, +1 bilateral lower extremity edema  Lymphatics: no palpable cervical, axillary, or inguinal LAD. No HSM  Skin: no concerning lesions or rashes  Neuro: A&Ox4, no lateralizing sx. Grossly nonfocal.  Psych: appropriate, reactive    Labs:   I personally reviewed the following labs:    Most Recent 3 CBC's:  Recent Labs   Lab Test 11/08/23  1557 09/13/23  1349 08/02/23  1607   WBC 4.3 4.6 4.3   HGB 12.6* 11.8* 12.2*   MCV 93 95 97   * 111* 91*     Most Recent 3 BMP's:  Recent Labs   Lab Test 08/28/23  1512 08/09/23  1102 08/02/23  1607    137 138   POTASSIUM 4.6 4.3 4.2   CHLORIDE 101 103 102   CO2 26 23 25   BUN 19.0 16.1 15.4   CR 1.33* 1.36* 1.40*   ANIONGAP 13 11 11   TRE 9.4 9.5 9.7   * 116* 105*     Most Recent 2 LFT's:  Recent Labs   Lab Test 08/02/23  1607 05/02/23  1104   AST 25 22   ALT 32 27   ALKPHOS 75 72   BILITOTAL 0.5 0.4          Imaging:   No new imaging to review       Impression/plan:   Juvenal Blake is a 58 year old male with  refractory follicular lymphoma, 1 yr 9 mo after Yescarta CAR-T therapy.     1. Follicular Lymphoma/Yescarta CAR-T:    Relapsed after BR, [facility] NK (9/1/20), idelalisib " (~4-6/2020),  (6/28/21), polatuzumab (12/21/21). (hx Rituxan reaction).       Now s/p YESCARTa 1/17/22  In CR at 12 months. Clinical ongoing remission at 18 months.     Next restaging at 2 years     2. HEME: stable counts.    Platelets stable in the low 100s  ANC recovered.        3. ID:  S/p Prevnar    Recevied COVID and flu vaccines fall 2023     Hypogammaglobulinemia - IVIG through [facility] for IgG < 300        #Prophy: acyclovir 800mg BID.   CD4 is still lowish 126-Continue PJP porphylaxis with Bactrim Mon, Tues     4. GI:    - omeprazole for acid reflux  - hx medical cannibis     5. Renal/FEN: Creat, lytes wnl.  Avoid NSAIDs.     6. CV:    History of stable angina and NSTEMI type II.  Evaluated by cardiology prior to transplant, CTA coronary artery and echo completed.    Carotid artery plaque - we asked to follow-up with PCP        7. : recc to r/u with urology re bladder wall thickening  #hx Prostatic adenocarcinoma: s/p radical prostatectomy and bilateral lymph node dissection 11/2017. Completed radiation to the prostate fossa and pelvic lymph nodes at [facility] early May 2018. He received 4500 cGy in 25 fractions. He then had 2340 cGy boost in 13 fractions to the prostatic fossa, for a total dose of 6240 cGy in 38 treatment fractions delivered over 54 days. He did not receive hormonal therapy:    - PSA 1.21 on 1/25/22, 0.71 on 9/10/21     MSK  Shoulder surgery in late August. Recovering well.  Continues with PT.     Plan:  2 years anniversary scheduled in Dec 2023     36 minutes spent on the date of the encounter doing chart review, review of test results, patient visit, and documentation     ALESSANDRO Ho CNP  [facility]  [address] [place] [address] [phone]

## 2023-11-27 ENCOUNTER — LAB (OUTPATIENT)
Dept: LAB | Facility: CLINIC | Age: 58
End: 2023-11-27
Payer: COMMERCIAL

## 2023-11-27 DIAGNOSIS — D80.1 HYPOGAMMAGLOBULINEMIA (H): ICD-10-CM

## 2023-11-27 LAB — HOLD SPECIMEN: NORMAL

## 2023-11-27 PROCEDURE — 36415 COLL VENOUS BLD VENIPUNCTURE: CPT

## 2023-11-27 PROCEDURE — 82784 ASSAY IGA/IGD/IGG/IGM EACH: CPT | Performed by: REGISTERED NURSE

## 2023-11-27 NOTE — NURSING NOTE
Chief Complaint   Patient presents with    Labs Only     Labs collected from venipuncture by RN.      Rolanda Ronquillo, RN

## 2023-11-28 LAB — IGG SERPL-MCNC: 358 MG/DL (ref 610–1616)

## 2023-12-05 ENCOUNTER — CARE COORDINATION (OUTPATIENT)
Dept: TRANSPLANT | Facility: CLINIC | Age: 58
End: 2023-12-05
Payer: COMMERCIAL

## 2023-12-05 DIAGNOSIS — C82.08 FOLLICULAR LYMPHOMA GRADE I, LYMPH NODES OF MULTIPLE SITES (H): Primary | ICD-10-CM

## 2023-12-05 DIAGNOSIS — C82.00 FOLLICULAR LYMPHOMA GRADE I, UNSPECIFIED BODY REGION (H): ICD-10-CM

## 2023-12-14 ENCOUNTER — LAB (OUTPATIENT)
Dept: LAB | Facility: CLINIC | Age: 58
End: 2023-12-14
Payer: COMMERCIAL

## 2023-12-14 ENCOUNTER — PATIENT OUTREACH (OUTPATIENT)
Dept: ONCOLOGY | Facility: CLINIC | Age: 58
End: 2023-12-14
Payer: COMMERCIAL

## 2023-12-14 DIAGNOSIS — D80.1 HYPOGAMMAGLOBULINEMIA (H): ICD-10-CM

## 2023-12-14 DIAGNOSIS — C82.08 FOLLICULAR LYMPHOMA GRADE I, LYMPH NODES OF MULTIPLE SITES (H): ICD-10-CM

## 2023-12-14 DIAGNOSIS — D80.1 HYPOGAMMAGLOBULINEMIA (H): Primary | ICD-10-CM

## 2023-12-14 PROCEDURE — 82784 ASSAY IGA/IGD/IGG/IGM EACH: CPT

## 2023-12-14 PROCEDURE — 36415 COLL VENOUS BLD VENIPUNCTURE: CPT

## 2023-12-14 NOTE — NURSING NOTE
Chief Complaint   Patient presents with    Blood Draw     Labs drawn via  by RN in lab.      Labs collected from venipuncture by RN.    Merle Mendez RN

## 2023-12-15 LAB — IGG SERPL-MCNC: 346 MG/DL (ref 610–1616)

## 2023-12-20 ENCOUNTER — LAB REQUISITION (OUTPATIENT)
Dept: LAB | Facility: CLINIC | Age: 58
End: 2023-12-20
Payer: COMMERCIAL

## 2023-12-20 DIAGNOSIS — D80.6: ICD-10-CM

## 2023-12-20 DIAGNOSIS — D80.1 NONFAMILIAL HYPOGAMMAGLOBULINEMIA (H): ICD-10-CM

## 2023-12-20 LAB — HOLD SPECIMEN: NORMAL

## 2023-12-20 PROCEDURE — 86334 IMMUNOFIX E-PHORESIS SERUM: CPT | Mod: 26 | Performed by: PATHOLOGY

## 2023-12-20 PROCEDURE — 86334 IMMUNOFIX E-PHORESIS SERUM: CPT | Performed by: PATHOLOGY

## 2023-12-21 LAB — PROT PATTERN SERPL IFE-IMP: NORMAL

## 2023-12-22 ENCOUNTER — ANCILLARY PROCEDURE (OUTPATIENT)
Dept: CT IMAGING | Facility: CLINIC | Age: 58
End: 2023-12-22
Attending: PHYSICIAN ASSISTANT
Payer: COMMERCIAL

## 2023-12-22 DIAGNOSIS — C82.08 FOLLICULAR LYMPHOMA GRADE I, LYMPH NODES OF MULTIPLE SITES (H): ICD-10-CM

## 2023-12-22 PROCEDURE — 70491 CT SOFT TISSUE NECK W/DYE: CPT | Mod: GC | Performed by: STUDENT IN AN ORGANIZED HEALTH CARE EDUCATION/TRAINING PROGRAM

## 2023-12-22 PROCEDURE — 71260 CT THORAX DX C+: CPT | Mod: GC | Performed by: STUDENT IN AN ORGANIZED HEALTH CARE EDUCATION/TRAINING PROGRAM

## 2023-12-22 PROCEDURE — 74177 CT ABD & PELVIS W/CONTRAST: CPT | Mod: GC | Performed by: STUDENT IN AN ORGANIZED HEALTH CARE EDUCATION/TRAINING PROGRAM

## 2023-12-22 RX ORDER — IOPAMIDOL 755 MG/ML
114 INJECTION, SOLUTION INTRAVASCULAR ONCE
Status: COMPLETED | OUTPATIENT
Start: 2023-12-22 | End: 2023-12-22

## 2023-12-22 RX ORDER — HEPARIN SODIUM (PORCINE) LOCK FLUSH IV SOLN 100 UNIT/ML 100 UNIT/ML
500 SOLUTION INTRAVENOUS ONCE
Status: DISCONTINUED | OUTPATIENT
Start: 2023-12-22 | End: 2023-12-22

## 2023-12-22 RX ADMIN — IOPAMIDOL 114 ML: 755 INJECTION, SOLUTION INTRAVASCULAR at 10:20

## 2023-12-22 NOTE — DISCHARGE INSTRUCTIONS

## 2023-12-27 ENCOUNTER — ONCOLOGY VISIT (OUTPATIENT)
Dept: TRANSPLANT | Facility: CLINIC | Age: 58
End: 2023-12-27
Payer: COMMERCIAL

## 2023-12-27 ENCOUNTER — APPOINTMENT (OUTPATIENT)
Dept: LAB | Facility: CLINIC | Age: 58
End: 2023-12-27
Payer: COMMERCIAL

## 2023-12-27 VITALS
DIASTOLIC BLOOD PRESSURE: 86 MMHG | BODY MASS INDEX: 35.01 KG/M2 | OXYGEN SATURATION: 98 % | WEIGHT: 227 LBS | TEMPERATURE: 98 F | HEART RATE: 72 BPM | RESPIRATION RATE: 16 BRPM | SYSTOLIC BLOOD PRESSURE: 135 MMHG

## 2023-12-27 DIAGNOSIS — D80.1 HYPOGAMMAGLOBULINEMIA (H): ICD-10-CM

## 2023-12-27 DIAGNOSIS — C82.08 FOLLICULAR LYMPHOMA GRADE I, LYMPH NODES OF MULTIPLE SITES (H): ICD-10-CM

## 2023-12-27 LAB
ALBUMIN SERPL BCG-MCNC: 4.3 G/DL (ref 3.5–5.2)
ALP SERPL-CCNC: 75 U/L (ref 40–150)
ALT SERPL W P-5'-P-CCNC: 53 U/L (ref 0–70)
ANION GAP SERPL CALCULATED.3IONS-SCNC: 10 MMOL/L (ref 7–15)
AST SERPL W P-5'-P-CCNC: 33 U/L (ref 0–45)
BASOPHILS # BLD AUTO: 0 10E3/UL (ref 0–0.2)
BASOPHILS NFR BLD AUTO: 0 %
BILIRUB SERPL-MCNC: 0.4 MG/DL
BUN SERPL-MCNC: 15.5 MG/DL (ref 8–23)
CALCIUM SERPL-MCNC: 9.3 MG/DL (ref 8.8–10.2)
CHLORIDE SERPL-SCNC: 104 MMOL/L (ref 98–107)
CREAT SERPL-MCNC: 1.36 MG/DL (ref 0.67–1.17)
DEPRECATED HCO3 PLAS-SCNC: 25 MMOL/L (ref 22–29)
EGFRCR SERPLBLD CKD-EPI 2021: 60 ML/MIN/1.73M2
EOSINOPHIL # BLD AUTO: 0.1 10E3/UL (ref 0–0.7)
EOSINOPHIL NFR BLD AUTO: 1 %
ERYTHROCYTE [DISTWIDTH] IN BLOOD BY AUTOMATED COUNT: 14.3 % (ref 10–15)
GLUCOSE SERPL-MCNC: 105 MG/DL (ref 70–99)
HCT VFR BLD AUTO: 39.4 % (ref 40–53)
HGB BLD-MCNC: 13.9 G/DL (ref 13.3–17.7)
IMM GRANULOCYTES # BLD: 0 10E3/UL
IMM GRANULOCYTES NFR BLD: 0 %
LDH SERPL L TO P-CCNC: 171 U/L (ref 0–250)
LYMPHOCYTES # BLD AUTO: 0.7 10E3/UL (ref 0.8–5.3)
LYMPHOCYTES NFR BLD AUTO: 14 %
MCH RBC QN AUTO: 32.9 PG (ref 26.5–33)
MCHC RBC AUTO-ENTMCNC: 35.3 G/DL (ref 31.5–36.5)
MCV RBC AUTO: 93 FL (ref 78–100)
MONOCYTES # BLD AUTO: 0.4 10E3/UL (ref 0–1.3)
MONOCYTES NFR BLD AUTO: 8 %
NEUTROPHILS # BLD AUTO: 3.7 10E3/UL (ref 1.6–8.3)
NEUTROPHILS NFR BLD AUTO: 77 %
NRBC # BLD AUTO: 0 10E3/UL
NRBC BLD AUTO-RTO: 0 /100
PLATELET # BLD AUTO: 135 10E3/UL (ref 150–450)
POTASSIUM SERPL-SCNC: 4.1 MMOL/L (ref 3.4–5.3)
PROT SERPL-MCNC: 6.7 G/DL (ref 6.4–8.3)
RBC # BLD AUTO: 4.23 10E6/UL (ref 4.4–5.9)
SODIUM SERPL-SCNC: 139 MMOL/L (ref 135–145)
WBC # BLD AUTO: 4.9 10E3/UL (ref 4–11)

## 2023-12-27 PROCEDURE — 99213 OFFICE O/P EST LOW 20 MIN: CPT

## 2023-12-27 PROCEDURE — 80053 COMPREHEN METABOLIC PANEL: CPT

## 2023-12-27 PROCEDURE — 82784 ASSAY IGA/IGD/IGG/IGM EACH: CPT

## 2023-12-27 PROCEDURE — 36591 DRAW BLOOD OFF VENOUS DEVICE: CPT

## 2023-12-27 PROCEDURE — 250N000011 HC RX IP 250 OP 636

## 2023-12-27 PROCEDURE — 99214 OFFICE O/P EST MOD 30 MIN: CPT

## 2023-12-27 PROCEDURE — 85004 AUTOMATED DIFF WBC COUNT: CPT

## 2023-12-27 PROCEDURE — 83615 LACTATE (LD) (LDH) ENZYME: CPT

## 2023-12-27 RX ORDER — HEPARIN SODIUM (PORCINE) LOCK FLUSH IV SOLN 100 UNIT/ML 100 UNIT/ML
5 SOLUTION INTRAVENOUS ONCE
Status: COMPLETED | OUTPATIENT
Start: 2023-12-27 | End: 2023-12-27

## 2023-12-27 RX ADMIN — Medication 5 ML: at 15:17

## 2023-12-27 ASSESSMENT — PAIN SCALES - GENERAL: PAINLEVEL: NO PAIN (0)

## 2023-12-27 NOTE — PROGRESS NOTES
CELL THERAPY ANNIVERSARY VISIT.  12/27/2023    Juvenal is 24 months after Yescarta infusion for his multiply relapsed follicular B-cell lymphoma.  He has been in CR and had restaging now. This is an anniversary visit.     INTERIM HISTORY: Juvenal is: feeling very well but has ongoing infections. Another cold and cough started 4 days ago, he was given antibiotics by his primary MD. He thinks IVIG was helping. No fevers or chills, no SOB, but he is coughing   He is not smoking now.   His has body aches and join aches.     ROS:   he is not working at this time , on disability and interested to stay on it for some time.  No weight loss, no N/V, moving around well.     On exam: AO, in good spirits. Not ill apearring. /86   Pulse 72   Temp 98  F (36.7  C)   Resp 16   Wt 103 kg (227 lb)   SpO2 98%   BMI 35.01 kg/m    Physical Exam:    Patient is ambulating , AOx3, NAD, well appearing patient. No adenpathy   HEENT: No mucositis, MM moist, no thrush or ulcers, buccal mucosa intact  Neck supple, no peripheral adenopathy Thyroid gland midline, not enlarged   Lungs: good air exchange, CTA, no crackles,no wheezing.   Heart RRR S1 S2, no murmur or gallop   Abd soft, NT, ND. Without hepato-splenomegaly, no ascites,    LE symmetrical, no edema   Skin without lesion or rashes. No petechiae or ecchymosis.  Neuro  Intact, AOx3      Lab Results   Component Value Date    WBC 4.9 12/27/2023    ANEU 3.1 04/18/2022    HGB 13.9 12/27/2023    HCT 39.4 (L) 12/27/2023     (L) 12/27/2023     12/27/2023    POTASSIUM 4.1 12/27/2023    CHLORIDE 104 12/27/2023    CO2 25 12/27/2023     (H) 12/27/2023    BUN 15.5 12/27/2023    CR 1.36 (H) 12/27/2023    MAG 2.0 08/02/2023    INR 0.96 02/17/2022    AST 33 12/27/2023    ALT 53 12/27/2023   CD4 count 111 (aug 23)  CD19 count 67   IgG level 655 now (dec 2023)     CT 12/22/2023                                                                     IMPRESSION:   1. No new or  enlarging lymphadenopathy throughout the chest, abdomen  or pelvis. Stable retroperitoneal fat stranding, which is likely a  sequelae of lymphoma treatment.  2. Bilateral inguinal hernias. The left inguinal hernia contains a  small knuckle of the sigmoid colon without findings to suggest  obstruction.  3. Stable mild hepatosplenomegaly.  4. No new or enlarging pulmonary nodularity.          ASSESSMENT AND PLAN:    Assessment:  Juvenal Blake is a 58 year old male with  refractory follicular lymphoma, 2 years after Yescarta CAR-T therapy.     1. Follicular Lymphoma/Yescarta CAR-T:    Relapsed after BR, OCHOP, NAM NK (9/1/20), idelalisib (~4-6/2020),  (6/28/21), polatuzumab (12/21/21). (hx Rituxan reaction).      s/p YESCARTa  In CR at 12 months. Clinica ongoing remission at 18 months.    Restaging at 2 years - ongoing CR     2. HEME: stable counts.plts `100 - better.   ANC recovered.      3. 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 Pt will cont IVIG replacement for IgG below 400mg/dl due to frequent infections.  #Prophy: acyclovir 800mg BID.   CD4 is still lowish 111- I recc to resume PJP porphylaxis  I re-ordred Bactrim po on Mo, Tue.          4. GI:    - omeprazole for acid reflux  - hx medical cannibis     5. Renal/FEN: Creat, lytes wnl.  Avoid NSAIDs.    6. CV:    History of stable angina and NSTEMI type II.  Evaluated by cardiology prior to transplant, CTA coronary artery and echo completed.    Carotid artery plaque - we asked to follow-up with PCP      7. : recc to r/u with urology re bladder wall thickening  #hx Prostatic adenocarcinoma: s/p radical prostatectomy and bilateral lymph node dissection 11/2017. Completed radiation to the prostate fossa and pelvic lymph nodes at [facility] early May 2018. He received 4500 cGy in 25 fractions. He then had 2340 cGy boost in 13 fractions to the prostatic  nikki, for a total dose of 6240 cGy in 38 treatment fractions delivered over 54 days. He did not receive hormonal therapy:    - PSA 1.21 on 1/25/22, 0.71 on 9/10/21    MSK  Shoulder surgery in late August. Ok from onc perspective, please obtain CBC a day before surgery to CBC plt count and ANC.   No special precautions.     Plan:     Ok to return to  for ongoing follow-up care.  Plan to check IgG monthly and infuse IVIG for levels below 400mg /dl  Cont Bactrim for 6 more months  No more CT but cont ongoing care q 6 months for CBC, comp and physical exam.    Thanks for the opportunity to care for Juvenal and we are happy he is doing well today !    Rosa Terry MD  Professor of Medicine  [phone]    Cellular Therapy Hand-off Note    12/28/2023     Patient ID: Juvenal Blake is a 58 year old patient with FL, who was referred to us for cellular therapy. S/he has completed his/her therapy and is ready to resume routine care with your clinic. Today is 710 of cellular therapy.     The patient's therapy was complicated by: frequent infections     Moving forward, please be aware of the following restrictions and recommendations:    Lab Monitoring:  We recommend bi-weekly CBC with platelets and differential between days 30-60 post cellular therapy, and at least monthly thereafter. Note that patients can experience cytopenias for long periods after cellular therapy, and may require more frequent monitoring. If cytopenias persists beyond 6 months, please consider obtaining a bone marrow biopsy to check for the development of myelodysplastic syndrome.    Hypogammaglobulinemia:  Patients who received a CD19 or BCMA directed CAR-T product are at risk for prolonged hypogammaglobulinemia. The most recent IgG level was 655mg/dl. We routinely check levels every 1 months.    If patients experience recurring pulmonary or sinus infections, please consider checking an IgG level more frequently (monthly). If that level is  <400mg/dl, please consider administering sucrose-free IVIG at regular intervals to supplement their ability to fight off infections. IgG recovery varies, but typically occurs by 1 year post CAR-T therapy.    Antimicrobial Prophylaxis:  The patient should continue varicella zoster prophylaxis through 1 years post therapy. Vaccination against Shingrix is recommended at that time.    During periods of neutropenia (ANC < 1.0), we recommend adding bacterial prophylaxis with levaquin 250mg daily.  If the patient cannot take levaquin, cefpodoxime 200mg bid is another option. Consider antifungal therapy with fluconazole for prolonged neutropenia lasting > 2 weeks; voriconazole or posaconazole should be considered instead of fluconazole if the patient has risk factors for mold infections (extensive history of neutropenia; history of fungal infections; imaging findings on chest CT concerning for fungal infection).     We recommend that the patient receive PJP prophylaxis until we determine that their CD4 count has recovered to >200. This will be checked at the 6 month visit at our facility. The patient is currently on Bactrim as PJP prophylaxis.    Vaccinations:    Patients who have received lymphodepleting chemotherapy prior to their cellular product are at risk of prolonged T-cell suppression, making optimal timing of vaccines unknown.      Influenza: We currently recommend a flu shot starting at day +60 post therapy (seasonally dependent).      Covid-19: We currently recommend re-initiating covid vaccinations at day +100 post therapy. Patients should repeat their primary series, and can then resume the usual booster schedule.        Childhood vaccines: patients who have had a prior autologous transplant should repeat their childhood vaccine series, beginning at 1 year after their therapy. Note that any live vaccines (MMR) should not be started until 2 years post therapy.      Secondary Cancers:    Patients who have  received cellular therapy are at risk for the development of secondary cancers. Prolonged cytopenias should be investigated for the potential development of myelodysplastic syndrome. Patients should undergo routine cancer screening per accepted preventive guidelines.    Thank you for including us in the care of this patient. We will continue to follow the patient along with you at our [facility] BMT/CT clinic for re-staging anniversary visits at months 3, 6, 9, at 1 year, 18 months and 2 years post cellular therapy.We appreciate your confidence in our program, and look forward to partnering with you in care. Please do not hesitate to reach out to us for any questions or concerns by calling [phone].

## 2023-12-27 NOTE — NURSING NOTE
"Oncology Rooming Note    December 27, 2023 3:58 PM   Juvenal Blake is a 58 year old male who presents for:    Chief Complaint   Patient presents with    Port Draw     Labs collected port by RN. Vitals taken. Checked in for appointment(s).      Oncology Clinic Visit     Hypogammaglobulinemia      Initial Vitals: /86   Pulse 72   Temp 98  F (36.7  C)   Resp 16   Wt 103 kg (227 lb)   SpO2 98%   BMI 35.01 kg/m   Estimated body mass index is 35.01 kg/m  as calculated from the following:    Height as of 11/8/23: 1.715 m (5' 7.52\").    Weight as of this encounter: 103 kg (227 lb). Body surface area is 2.22 meters squared.  No Pain (0) Comment: Data Unavailable   No LMP for male patient.  Allergies reviewed: Yes  Medications reviewed: Yes    Medications: Medication refills not needed today.  Pharmacy name entered into [facility]:    [id number] - [place], [place] - [address] [id number] - [place], [place] - [address] [facility] [address]    Frailty Screening:   Is the patient here for a new oncology consult visit in cancer care? 2. No      Clinical concerns: None       Nadine Madsen CMA              "

## 2023-12-27 NOTE — NURSING NOTE
Chief Complaint   Patient presents with    Port Draw     Labs collected port by RN. Vitals taken. Checked in for appointment(s).       Port accessed with 20 gauge 3/4 inch gripper needle by RN, labs collected, line flushed with saline and heparin.  Vitals taken. Pt checked in for appointment(s).    Jessica Hendricks RN

## 2023-12-27 NOTE — LETTER
12/27/2023         RE: Juvenal Blake  [address] [place] [address]        Dear Colleague,    Thank you for referring your patient, Juvenal Blake, to the [facility] BLOOD AND MARROW TRANSPLANT PROGRAM [place]. Please see a copy of my visit note below.        CELL THERAPY ANNIVERSARY VISIT.  12/27/2023    Juvenal is 24 months after Yescarta infusion for his multiply relapsed follicular B-cell lymphoma.  He has been in CR and had restaging now. This is an anniversary visit.     INTERIM HISTORY: Juvenal is: feeling very well but has ongoing infections. Another cold and cough started 4 days ago, he was given antibiotics by his primary MD. He thinks IVIG was helping. No fevers or chills, no SOB, but he is coughing   He is not smoking now.   His has body aches and join aches.     ROS:   he is not working at this time , on disability and interested to stay on it for some time.  No weight loss, no N/V, moving around well.     On exam: AO, in good spirits. Not ill apearring. /86   Pulse 72   Temp 98  F (36.7  C)   Resp 16   Wt 103 kg (227 lb)   SpO2 98%   BMI 35.01 kg/m    Physical Exam:    Patient is ambulating , AOx3, NAD, well appearing patient. No adenpathy   HEENT: No mucositis, MM moist, no thrush or ulcers, buccal mucosa intact  Neck supple, no peripheral adenopathy Thyroid gland midline, not enlarged   Lungs: good air exchange, CTA, no crackles,no wheezing.   Heart RRR S1 S2, no murmur or gallop   Abd soft, NT, ND. Without hepato-splenomegaly, no ascites,    LE symmetrical, no edema   Skin without lesion or rashes. No petechiae or ecchymosis.  Neuro  Intact, AOx3      Lab Results   Component Value Date    WBC 4.9 12/27/2023    ANEU 3.1 04/18/2022    HGB 13.9 12/27/2023    HCT 39.4 (L) 12/27/2023     (L) 12/27/2023     12/27/2023    POTASSIUM 4.1 12/27/2023    CHLORIDE 104 12/27/2023    CO2 25 12/27/2023     (H) 12/27/2023    BUN 15.5 12/27/2023    CR 1.36 (H)  12/27/2023    MAG 2.0 08/02/2023    INR 0.96 02/17/2022    AST 33 12/27/2023    ALT 53 12/27/2023   CD4 count 111 (aug 23)  CD19 count 67   IgG level 655 now (dec 2023)     CT 12/22/2023                                                                     IMPRESSION:   1. No new or enlarging lymphadenopathy throughout the chest, abdomen  or pelvis. Stable retroperitoneal fat stranding, which is likely a  sequelae of lymphoma treatment.  2. Bilateral inguinal hernias. The left inguinal hernia contains a  small knuckle of the sigmoid colon without findings to suggest  obstruction.  3. Stable mild hepatosplenomegaly.  4. No new or enlarging pulmonary nodularity.          ASSESSMENT AND PLAN:    Assessment:  Juvenal Blake is a 58 year old male with  refractory follicular lymphoma, 2 years after Yescarta CAR-T therapy.     1. Follicular Lymphoma/Yescarta CAR-T:    Relapsed after BR, OCHOP, NAM NK (9/1/20), idelalisib (~4-6/2020),  (6/28/21), polatuzumab (12/21/21). (hx Rituxan reaction).      s/p YESCARTa  In CR at 12 months. [facility] ongoing remission at 18 months.    Restaging at 2 years - ongoing CR     2. HEME: stable counts.plts `100 - better.   ANC recovered.      3. 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 Pt will cont IVIG replacement for IgG below 400mg/dl due to frequent infections.  #Prophy: acyclovir 800mg BID.   CD4 is still lowish 111- I recc to resume PJP porphylaxis  I re-ordred Bactrim po on Mo, Tue.          4. GI:    - omeprazole for acid reflux  - hx medical cannibis     5. Renal/FEN: Creat, lytes wnl.  Avoid NSAIDs.    6. CV:    History of stable angina and NSTEMI type II.  Evaluated by cardiology prior to transplant, CTA coronary artery and echo completed.    Carotid artery plaque - we asked to follow-up with PCP      7. : recc to r/u with urology re bladder wall thickening  #hx Prostatic  adenocarcinoma: s/p radical prostatectomy and bilateral lymph node dissection 11/2017. Completed radiation to the prostate fossa and pelvic lymph nodes at [facility] early May 2018. He received 4500 cGy in 25 fractions. He then had 2340 cGy boost in 13 fractions to the prostatic fossa, for a total dose of 6240 cGy in 38 treatment fractions delivered over 54 days. He did not receive hormonal therapy:    - PSA 1.21 on 1/25/22, 0.71 on 9/10/21    MS[facility]  Shoulder surgery in late August. Ok from onc perspective, please obtain CBC a day before surgery to CBC plt count and ANC.   No special precautions.     Plan:     Ok to return to  for ongoing follow-up care.  Plan to check IgG monthly and infuse IVIG for levels below 400mg /dl  Cont Bactrim for 6 more months  No more CT but cont ongoing care q 6 months for CBC, comp and physical exam.    Thanks for the opportunity to care for Juvenal and we are happy he is doing well today !    Rosa Terry MD  Professor of Medicine  [phone]    Cellular Therapy Hand-off Note    12/28/2023     Patient ID: Juvenal Blake is a 58 year old patient with FL, who was referred to us for cellular therapy. S/he has completed his/her therapy and is ready to resume routine care with your clinic. Today is 710 of cellular therapy.     The patient's therapy was complicated by: frequent infections     Moving forward, please be aware of the following restrictions and recommendations:    Lab Monitoring:  We recommend bi-weekly CBC with platelets and differential between days 30-60 post cellular therapy, and at least monthly thereafter. Note that patients can experience cytopenias for long periods after cellular therapy, and may require more frequent monitoring. If cytopenias persists beyond 6 months, please consider obtaining a bone marrow biopsy to check for the development of myelodysplastic syndrome.    Hypogammaglobulinemia:  Patients who received a CD19 or BCMA directed CAR-T  product are at risk for prolonged hypogammaglobulinemia. The most recent IgG level was 655mg/dl. We routinely check levels every 1 months.    If patients experience recurring pulmonary or sinus infections, please consider checking an IgG level more frequently (monthly). If that level is <400mg/dl, please consider administering sucrose-free IVIG at regular intervals to supplement their ability to fight off infections. IgG recovery varies, but typically occurs by 1 year post CAR-T therapy.    Antimicrobial Prophylaxis:  The patient should continue varicella zoster prophylaxis through 1 years post therapy. Vaccination against Shingrix is recommended at that time.    During periods of neutropenia (ANC < 1.0), we recommend adding bacterial prophylaxis with levaquin 250mg daily.  If the patient cannot take levaquin, cefpodoxime 200mg bid is another option. Consider antifungal therapy with fluconazole for prolonged neutropenia lasting > 2 weeks; voriconazole or posaconazole should be considered instead of fluconazole if the patient has risk factors for mold infections (extensive history of neutropenia; history of fungal infections; imaging findings on chest CT concerning for fungal infection).     We recommend that the patient receive PJP prophylaxis until we determine that their CD4 count has recovered to >200. This will be checked at the 6 month visit at our facility. The patient is currently on Bactrim as PJP prophylaxis.    Vaccinations:    Patients who have received lymphodepleting chemotherapy prior to their cellular product are at risk of prolonged T-cell suppression, making optimal timing of vaccines unknown.      Influenza: We currently recommend a flu shot starting at day +60 post therapy (seasonally dependent).      Covid-19: We currently recommend re-initiating covid vaccinations at day +100 post therapy. Patients should repeat their primary series, and can then resume the usual booster schedule.         Childhood vaccines: patients who have had a prior autologous transplant should repeat their childhood vaccine series, beginning at 1 year after their therapy. Note that any live vaccines (MMR) should not be started until 2 years post therapy.      Secondary Cancers:    Patients who have received cellular therapy are at risk for the development of secondary cancers. Prolonged cytopenias should be investigated for the potential development of myelodysplastic syndrome. Patients should undergo routine cancer screening per accepted preventive guidelines.    Thank you for including us in the care of this patient. We will continue to follow the patient along with you at our [facility] clinic for re-staging anniversary visits at months 3, 6, 9, at 1 year, 18 months and 2 years post cellular therapy.We appreciate your confidence in our program, and look forward to partnering with you in care. Please do not hesitate to reach out to us for any questions or concerns by calling [phone].       Rosa Terry MD

## 2023-12-28 LAB — IGG SERPL-MCNC: 655 MG/DL (ref 610–1616)

## 2024-01-04 DIAGNOSIS — D72.818 LOW CD4 CELL COUNT DETERMINED BY FLOW CYTOMETRY: ICD-10-CM

## 2024-01-04 DIAGNOSIS — C82.00 FOLLICULAR LYMPHOMA GRADE I, UNSPECIFIED BODY REGION (H): ICD-10-CM

## 2024-01-04 RX ORDER — SULFAMETHOXAZOLE/TRIMETHOPRIM 800-160 MG
1 TABLET ORAL
Qty: 16 TABLET | Refills: 0 | Status: SHIPPED | OUTPATIENT
Start: 2024-01-08 | End: 2024-01-05

## 2024-01-04 NOTE — CONFIDENTIAL NOTE
Received a refill request from [facility] for Bactrim. Patient completed Cellular Therapy follow-up last week - sent prescription to bridge patient until he has resumed care with his primary oncologist - Dr. Crooks.     Izzy Frey, RN, MSN  BMT Nurse Coordinator  Phone: [phone]

## 2024-01-05 RX ORDER — SULFAMETHOXAZOLE/TRIMETHOPRIM 800-160 MG
1 TABLET ORAL
Qty: 16 TABLET | Refills: 0 | Status: SHIPPED | OUTPATIENT
Start: 2024-01-08 | End: 2024-03-16

## 2024-01-05 NOTE — CONFIDENTIAL NOTE
Pharmacy contacted BMT office asking for a new script. New prescription sent.     Izzy Frey RN, MSN  BMT Nurse Coordinator  Phone: [phone]

## 2024-01-08 ENCOUNTER — PATIENT OUTREACH (OUTPATIENT)
Dept: ONCOLOGY | Facility: CLINIC | Age: 59
End: 2024-01-08
Payer: COMMERCIAL

## 2024-01-08 NOTE — PROGRESS NOTES
[facility]: Cancer Care Short Note                                    Discussion with Patient:                                                        OUTBOUND CALL: RNCC called patient to discuss visit with Dr. Terry on 12/27 and discuss recommendations going forward.   Reviewed CT scan -- pt does have two bilateral inguinal hernias. He will follow up with PCP.  Plan to continue monthly IVIG and infuse if he is <400 through [facility]   Will discuss with Dr. Terry if January BAN visit can be cancelled, as pt was just seen with labs and CTs.   Pt will follow p with Dr. Crooks in 6 months with labs and physical exam. No further imaging needed.  RNCC to send request to [facility] to facilitate transfer of care. Will let patient know about [facility] visit and IVIG until then.   Pt verbalizes understanding and has no other questions, comments, or concerns at this time.     Suri Landa, RN, MSN, OCN   RN Care Coordinator   [facility]   [address] [place] [id number]   [phone]

## 2024-01-17 ENCOUNTER — TRANSFERRED RECORDS (OUTPATIENT)
Dept: HEALTH INFORMATION MANAGEMENT | Facility: CLINIC | Age: 59
End: 2024-01-17
Payer: COMMERCIAL

## 2024-01-19 ENCOUNTER — TELEPHONE (OUTPATIENT)
Dept: ONCOLOGY | Facility: HOSPITAL | Age: 59
End: 2024-01-19
Payer: COMMERCIAL

## 2024-02-01 ENCOUNTER — LAB (OUTPATIENT)
Dept: LAB | Facility: CLINIC | Age: 59
End: 2024-02-01
Payer: COMMERCIAL

## 2024-02-01 DIAGNOSIS — D80.1 HYPOGAMMAGLOBULINEMIA (H): ICD-10-CM

## 2024-02-01 DIAGNOSIS — C82.08 FOLLICULAR LYMPHOMA GRADE I, LYMPH NODES OF MULTIPLE SITES (H): Primary | ICD-10-CM

## 2024-02-01 PROCEDURE — 82784 ASSAY IGA/IGD/IGG/IGM EACH: CPT

## 2024-02-01 PROCEDURE — 36415 COLL VENOUS BLD VENIPUNCTURE: CPT

## 2024-02-02 ENCOUNTER — PATIENT OUTREACH (OUTPATIENT)
Dept: ONCOLOGY | Facility: HOSPITAL | Age: 59
End: 2024-02-02
Payer: COMMERCIAL

## 2024-02-02 LAB — IGG SERPL-MCNC: 418 MG/DL (ref 610–1616)

## 2024-02-02 NOTE — PROGRESS NOTES
[facility]: Cancer Care                                                                                          Called [facility] to see if they could send over PET scan images to patient's chart per Dr. Crooks's request.  Personnel that I talked to on the phone sent the images to media the PACS system.  I confirmed that I received the PET scan images with the personnel that I talked to on the phone.  I informed Dr. Crooks that the PET scan images are in the patient's chart for review.    Signature:  Lexi Martin RN

## 2024-02-15 ENCOUNTER — LAB REQUISITION (OUTPATIENT)
Dept: LAB | Facility: CLINIC | Age: 59
End: 2024-02-15

## 2024-02-15 ENCOUNTER — TRANSFERRED RECORDS (OUTPATIENT)
Dept: HEALTH INFORMATION MANAGEMENT | Facility: CLINIC | Age: 59
End: 2024-02-15

## 2024-02-15 DIAGNOSIS — J01.90 ACUTE SINUSITIS, UNSPECIFIED: ICD-10-CM

## 2024-02-15 PROCEDURE — 87081 CULTURE SCREEN ONLY: CPT | Performed by: FAMILY MEDICINE

## 2024-02-17 LAB — BACTERIA SPEC CULT: NORMAL

## 2024-02-25 ENCOUNTER — HEALTH MAINTENANCE LETTER (OUTPATIENT)
Age: 59
End: 2024-02-25

## 2024-02-26 ENCOUNTER — LAB REQUISITION (OUTPATIENT)
Dept: LAB | Facility: CLINIC | Age: 59
End: 2024-02-26

## 2024-02-26 DIAGNOSIS — Z01.818 ENCOUNTER FOR OTHER PREPROCEDURAL EXAMINATION: ICD-10-CM

## 2024-02-26 LAB
ALBUMIN SERPL BCG-MCNC: 4.3 G/DL (ref 3.5–5.2)
ALP SERPL-CCNC: 71 U/L (ref 40–150)
ALT SERPL W P-5'-P-CCNC: 27 U/L (ref 0–70)
ANION GAP SERPL CALCULATED.3IONS-SCNC: 10 MMOL/L (ref 7–15)
AST SERPL W P-5'-P-CCNC: 23 U/L (ref 0–45)
BILIRUB SERPL-MCNC: 0.5 MG/DL
BUN SERPL-MCNC: 19 MG/DL (ref 8–23)
CALCIUM SERPL-MCNC: 9.8 MG/DL (ref 8.8–10.2)
CHLORIDE SERPL-SCNC: 103 MMOL/L (ref 98–107)
CREAT SERPL-MCNC: 1.12 MG/DL (ref 0.67–1.17)
DEPRECATED HCO3 PLAS-SCNC: 28 MMOL/L (ref 22–29)
EGFRCR SERPLBLD CKD-EPI 2021: 75 ML/MIN/1.73M2
GLUCOSE SERPL-MCNC: 134 MG/DL (ref 70–99)
POTASSIUM SERPL-SCNC: 4.3 MMOL/L (ref 3.4–5.3)
PROT SERPL-MCNC: 6.1 G/DL (ref 6.4–8.3)
SODIUM SERPL-SCNC: 141 MMOL/L (ref 135–145)

## 2024-02-26 PROCEDURE — 82040 ASSAY OF SERUM ALBUMIN: CPT | Performed by: FAMILY MEDICINE

## 2024-03-07 DIAGNOSIS — C83.398 DIFFUSE LARGE B-CELL LYMPHOMA OF EXTRANODAL SITE EXCLUDING SPLEEN AND OTHER SOLID ORGANS: Primary | ICD-10-CM

## 2024-03-10 DIAGNOSIS — D80.1 HYPOGAMMAGLOBULINEMIA (H): Primary | ICD-10-CM

## 2024-03-10 RX ORDER — DIPHENHYDRAMINE HYDROCHLORIDE 50 MG/ML
50 INJECTION INTRAMUSCULAR; INTRAVENOUS
Start: 2025-02-19

## 2024-03-10 RX ORDER — ALBUTEROL SULFATE 90 UG/1
1-2 AEROSOL, METERED RESPIRATORY (INHALATION)
Start: 2025-02-19

## 2024-03-10 RX ORDER — HEPARIN SODIUM (PORCINE) LOCK FLUSH IV SOLN 100 UNIT/ML 100 UNIT/ML
5 SOLUTION INTRAVENOUS
OUTPATIENT
Start: 2025-02-19

## 2024-03-10 RX ORDER — ALBUTEROL SULFATE 0.83 MG/ML
2.5 SOLUTION RESPIRATORY (INHALATION)
OUTPATIENT
Start: 2025-02-19

## 2024-03-10 RX ORDER — NALOXONE HYDROCHLORIDE 0.4 MG/ML
0.2 INJECTION, SOLUTION INTRAMUSCULAR; INTRAVENOUS; SUBCUTANEOUS
OUTPATIENT
Start: 2025-02-19

## 2024-03-10 RX ORDER — DIPHENHYDRAMINE HCL 25 MG
50 CAPSULE ORAL ONCE
OUTPATIENT
Start: 2025-02-19

## 2024-03-10 RX ORDER — ACETAMINOPHEN 325 MG/1
650 TABLET ORAL ONCE
OUTPATIENT
Start: 2025-02-19

## 2024-03-10 RX ORDER — HEPARIN SODIUM,PORCINE 10 UNIT/ML
5 VIAL (ML) INTRAVENOUS
OUTPATIENT
Start: 2025-02-19

## 2024-03-10 RX ORDER — MEPERIDINE HYDROCHLORIDE 25 MG/ML
25 INJECTION INTRAMUSCULAR; INTRAVENOUS; SUBCUTANEOUS EVERY 30 MIN PRN
OUTPATIENT
Start: 2025-02-19

## 2024-03-10 RX ORDER — EPINEPHRINE 1 MG/ML
0.3 INJECTION, SOLUTION, CONCENTRATE INTRAVENOUS EVERY 5 MIN PRN
OUTPATIENT
Start: 2025-02-19

## 2024-03-12 ENCOUNTER — LAB (OUTPATIENT)
Dept: LAB | Facility: CLINIC | Age: 59
End: 2024-03-12
Payer: COMMERCIAL

## 2024-03-12 DIAGNOSIS — C83.398 DIFFUSE LARGE B-CELL LYMPHOMA OF EXTRANODAL SITE EXCLUDING SPLEEN AND OTHER SOLID ORGANS: ICD-10-CM

## 2024-03-12 DIAGNOSIS — D80.1 HYPOGAMMAGLOBULINEMIA (H): ICD-10-CM

## 2024-03-12 LAB
BASOPHILS # BLD AUTO: 0 10E3/UL (ref 0–0.2)
BASOPHILS NFR BLD AUTO: 0 %
EOSINOPHIL # BLD AUTO: 0.1 10E3/UL (ref 0–0.7)
EOSINOPHIL NFR BLD AUTO: 2 %
ERYTHROCYTE [DISTWIDTH] IN BLOOD BY AUTOMATED COUNT: 13.6 % (ref 10–15)
HCT VFR BLD AUTO: 37.5 % (ref 40–53)
HGB BLD-MCNC: 12.7 G/DL (ref 13.3–17.7)
IMM GRANULOCYTES # BLD: 0 10E3/UL
IMM GRANULOCYTES NFR BLD: 0 %
LYMPHOCYTES # BLD AUTO: 0.8 10E3/UL (ref 0.8–5.3)
LYMPHOCYTES NFR BLD AUTO: 19 %
MCH RBC QN AUTO: 33.4 PG (ref 26.5–33)
MCHC RBC AUTO-ENTMCNC: 33.9 G/DL (ref 31.5–36.5)
MCV RBC AUTO: 99 FL (ref 78–100)
MONOCYTES # BLD AUTO: 0.4 10E3/UL (ref 0–1.3)
MONOCYTES NFR BLD AUTO: 9 %
NEUTROPHILS # BLD AUTO: 3.2 10E3/UL (ref 1.6–8.3)
NEUTROPHILS NFR BLD AUTO: 69 %
PLATELET # BLD AUTO: 103 10E3/UL (ref 150–450)
RBC # BLD AUTO: 3.8 10E6/UL (ref 4.4–5.9)
WBC # BLD AUTO: 4.5 10E3/UL (ref 4–11)

## 2024-03-12 PROCEDURE — 82784 ASSAY IGA/IGD/IGG/IGM EACH: CPT

## 2024-03-12 PROCEDURE — 36415 COLL VENOUS BLD VENIPUNCTURE: CPT

## 2024-03-12 PROCEDURE — 80053 COMPREHEN METABOLIC PANEL: CPT

## 2024-03-12 PROCEDURE — 85025 COMPLETE CBC W/AUTO DIFF WBC: CPT

## 2024-03-13 LAB
ALBUMIN SERPL BCG-MCNC: 4.5 G/DL (ref 3.5–5.2)
ALP SERPL-CCNC: 74 U/L (ref 40–150)
ALT SERPL W P-5'-P-CCNC: 22 U/L (ref 0–70)
ANION GAP SERPL CALCULATED.3IONS-SCNC: 11 MMOL/L (ref 7–15)
AST SERPL W P-5'-P-CCNC: 20 U/L (ref 0–45)
BILIRUB SERPL-MCNC: 0.4 MG/DL
BUN SERPL-MCNC: 15.6 MG/DL (ref 8–23)
CALCIUM SERPL-MCNC: 10.1 MG/DL (ref 8.8–10.2)
CHLORIDE SERPL-SCNC: 101 MMOL/L (ref 98–107)
CREAT SERPL-MCNC: 1.23 MG/DL (ref 0.67–1.17)
DEPRECATED HCO3 PLAS-SCNC: 27 MMOL/L (ref 22–29)
EGFRCR SERPLBLD CKD-EPI 2021: 67 ML/MIN/1.73M2
GLUCOSE SERPL-MCNC: 128 MG/DL (ref 70–99)
IGG SERPL-MCNC: 319 MG/DL (ref 610–1616)
POTASSIUM SERPL-SCNC: 4.3 MMOL/L (ref 3.4–5.3)
PROT SERPL-MCNC: 6.4 G/DL (ref 6.4–8.3)
SODIUM SERPL-SCNC: 139 MMOL/L (ref 135–145)

## 2024-03-16 ENCOUNTER — MYC REFILL (OUTPATIENT)
Dept: TRANSPLANT | Facility: CLINIC | Age: 59
End: 2024-03-16
Payer: COMMERCIAL

## 2024-03-16 DIAGNOSIS — D72.818 LOW CD4 CELL COUNT DETERMINED BY FLOW CYTOMETRY: ICD-10-CM

## 2024-03-16 DIAGNOSIS — C82.00 FOLLICULAR LYMPHOMA GRADE I, UNSPECIFIED BODY REGION (H): ICD-10-CM

## 2024-03-19 ENCOUNTER — DOCUMENTATION ONLY (OUTPATIENT)
Dept: PHARMACY | Facility: CLINIC | Age: 59
End: 2024-03-19
Payer: COMMERCIAL

## 2024-03-19 RX ORDER — SULFAMETHOXAZOLE/TRIMETHOPRIM 800-160 MG
1 TABLET ORAL
Qty: 16 TABLET | Refills: 0 | Status: SHIPPED | OUTPATIENT
Start: 2024-03-19 | End: 2024-04-19

## 2024-03-19 NOTE — PROGRESS NOTES
Skilled Nurse visit in the Patient Home to administer Privigen 35G IV.  Denies recent elevated temperature, fever, chills, productive cough, coughing for 3 weeks or longer or hemoptysis, abnormal vital signs, night sweats, chest pain, decrease in appetite, unexplained weight loss or fatigue.  No other new onset medical symptoms.  Current weight 226lbs.  Port-a-Cath accessed without problems.  Pre medicated with acetaminophen 650mg PO, diphenhydramine 50mg PO. Infusion completed without complication or reaction. Pt reports therapy is effective in managing symptoms related to therapy.    Alla Aiken RN  [phone]   [email or web address]

## 2024-03-19 NOTE — CONFIDENTIAL NOTE
Bactrim DS Refill   Last prescribing provider: Dr terry     Last clinic visit date: 12/27/23 Dr terry     Recommendations for requested medication (if none, N/A): Copied from chart note   12/27/23 DR Terry   Cont Bactrim for 6 more months  We recommend that the patient receive PJP prophylaxis until we determine that their CD4 count has recovered to >200. This will be checked at the 6 month visit at our facility. The patient is currently on Bactrim as PJP prophylaxis.          Any other pertinent information (if none, N/A): N/A    Refilled: Y/N, if NO, why?

## 2024-04-09 ENCOUNTER — LAB (OUTPATIENT)
Dept: LAB | Facility: CLINIC | Age: 59
End: 2024-04-09
Payer: COMMERCIAL

## 2024-04-09 DIAGNOSIS — D80.1 HYPOGAMMAGLOBULINEMIA (H): ICD-10-CM

## 2024-04-09 PROCEDURE — 82784 ASSAY IGA/IGD/IGG/IGM EACH: CPT

## 2024-04-09 PROCEDURE — 36415 COLL VENOUS BLD VENIPUNCTURE: CPT

## 2024-04-10 LAB — IGG SERPL-MCNC: 492 MG/DL (ref 610–1616)

## 2024-04-18 ENCOUNTER — TRANSFERRED RECORDS (OUTPATIENT)
Dept: HEALTH INFORMATION MANAGEMENT | Facility: CLINIC | Age: 59
End: 2024-04-18
Payer: COMMERCIAL

## 2024-04-19 ENCOUNTER — MYC REFILL (OUTPATIENT)
Dept: TRANSPLANT | Facility: CLINIC | Age: 59
End: 2024-04-19
Payer: COMMERCIAL

## 2024-04-19 DIAGNOSIS — C82.00 FOLLICULAR LYMPHOMA GRADE I, UNSPECIFIED BODY REGION (H): ICD-10-CM

## 2024-04-19 DIAGNOSIS — D72.818 LOW CD4 CELL COUNT DETERMINED BY FLOW CYTOMETRY: ICD-10-CM

## 2024-04-19 RX ORDER — SULFAMETHOXAZOLE/TRIMETHOPRIM 800-160 MG
1 TABLET ORAL
Qty: 16 TABLET | Refills: 0 | Status: SHIPPED | OUTPATIENT
Start: 2024-04-22 | End: 2024-05-16

## 2024-04-19 RX ORDER — SULFAMETHOXAZOLE/TRIMETHOPRIM 800-160 MG
1 TABLET ORAL
Qty: 16 TABLET | Refills: 0 | OUTPATIENT
Start: 2024-04-22

## 2024-04-19 NOTE — TELEPHONE ENCOUNTER
Patient has graduated from the BMT clinic. Called [facility] to have prescription sent to patient's primary oncologist.

## 2024-04-28 NOTE — PROGRESS NOTES
[facility]    Patient: Juvenal Blake  MRN: [id number]  Date of Service: May 7, 2024        Assessment and Plan:    1.  Follicular lymphoma: No clinical evidence of recurrent disease today.  Labs reviewed and show an LDH of 147, White count 4.0, hemoglobin 12.5 and platelets 94,000.  These are generally stable going back 9 months or so.  Will continue with every 6-month visits with labs history and physical.  Will not plan on any routine imaging.  Could consider once a year or less frequently to make sure there is no evidence of recurrent lymphoma.     2.  Prostate cancer: Radiographic evidence of recurrence seen on PET scan from January 2024. Started enzalutamide and relugolix in February 2024.  Per the urology clinic notes his PSA on April 8, 2024 was undetectable.  Germline and somatic genetic testing was discussed by his urologist.  I do not have any results at this time.  Side effects are being managed by urology.  He is on Megace and mirabegron.     3.  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  IgG levels will be checked monthly at the [facility] clinic which is closer to his home.  Will work on making sure [facility] home infusion can give him IVIG when he needs it.      4.  Prophylaxis: He is still on acyclovir which we will continue for now.  He is also on Bactrim for PCP prophylaxis given low CD4 count.  Will check a CD4 count today.  If it is above 250 I think we can stop the Bactrim.      Medical decision Making:  I spent 35 minutes in the care of this patient today, which included time necessary for preparation for the visit, face to face time with the patient, communication of recommendations to the care team, and documentation time.    ECOG Performance  1    Diagnosis:    1.  Follicular lymphoma: Diagnosed in April, 2010 . Stage IV diagnosis with bone marrow involvement.   Initial disease involved the cervical, supraclavicular, axillary, mesenteric mass, retroperitoneal nodes, and possibly the manubrium.  Relapse noted on imaging in December 2016.  Axillary node biopsy from February 2017 shows low-grade follicle center cell lymphoma.  Second relapse on imaging January 2019.  Repeat biopsy January 7, 2019 of a right medial thigh lymph node shows recurrent follicular lymphoma.  Grade 2.  Third relapse on imaging in May, 2020. Pathologic confirmation May 28, 2020 from a right axillary lymph node excisional biopsy.  Fourth relapse May 2021     2.  Prostate cancer:  Pathologic stage T3b prostate cancer.  Positive margins, multiple, and surgery.  3 lymph nodes were negative.  High risk for recurrent disease.  He is being followed at [facility].  Maricel 4+3 = 7.  Tertiary Maricel pattern 5 and preoperative PSA of 27.      Radiographic recurrence seen on PET scan from January 2024.  This showed uptake located between the rectum and lower postsurgical changes with an SUV of 10.6, measuring 22 x 15 mm.  Second area of uptake in the right retroperitoneum with a maximum SUV of 15.  Measures 13 x 16 cm.  He also had PSA worsening with a value of 5.46 in December 2023.  In December 2022 was 1.58.    3.  Hypogammaglobulinemia: He has received intermittent doses of IVIG.    Treatment:    Lymphoma:    He had 6 cycles of bendamustine and Rituxan completed in October, 2010.  He had 2 years of maintenance Rituxan therapy finishing in September, 2012.     Second course of bendamustine plus rituximab started February 20, 2017.  Cycle 6 was completed on July 11, 2017.  Then had maintenance rituximab through January, 2019.     O-CHOP started at second relapse.  Cycle 1 given April 23, 2019.  Cycle 6 given August 16, 2019.  Haplo NAM-NK therapy at [facility] September 1, 2020     [facility]  150 mg twice daily.       (6/28/21)  polatuzumab (12/21/21)      Axicabtagene ciloleucel (Yescarta) given 1/17/22.      Prostate:   Initial radical prostatectomy and bilateral lymph node dissection.  November 15, 2017.  Prostatic adenocarcinoma [place] 4+3 = 7 with tertiary pattern 5.  Tumor involves 45% of total surface area.  Positive for extensive extraprostatic extension.  Positive for bilateral seminal vesicle invasion.  Multiple margins positive.  Lymphovascular invasion not identified.  Perineural invasion was noted. Lymph nodes negative. 3 were examined (2 right obturator and 1 left obturator).     Completed radiation to the prostate fossa and pelvic lymph nodes at [facility] early May 2018.  He received 4500 cGy in 25 fractions.  He then had 2340 cGy boost in 13 fractions to the prostatic fossa, for a total dose of 6240 cGy in 38 treatment fractions delivered over 54 days.  He did not receive hormonal therapy.    Relugolix and enzalutamide started for recurrent/metastatic disease in February 2024.    Interim History:    Juvenal returns today for follow-up visit.  I have not seen him in our clinic since May 2021.  Since then he was at the [place].  He is now over 2 years out from his most recent treatment with CAR-T cell therapy.  He has been doing well.  Started on treatment for metastatic prostate cancer 2 months ago.  No acute complaints today.    Review of Systems:    As above in the history.     Review of Systems otherwise Negative for:  General: chills, fever or night sweats  Psychological: anxiety or depression  Ophthalmic: blurry vision, double vision or loss of vision, vision change  ENT: epistaxis, oral lesions, hearing changes  Hematological and Lymphatic: bleeding, bruising, jaundice, swollen lymph nodes  Endocrine: hot flashes, unexpected weight changes  Respiratory: cough, hemoptysis, orthopnea or shortness of breath/SAMS  Cardiovascular: chest pain, edema, palpitations or PND  Gastrointestinal: abdominal pain, blood in stools, change in bowel  "habits, constipation, diarrhea or nausea/vomiting  Genito-Urinary: change in urinary stream, incontinence, frequency/urgency  Musculoskeletal: joint pain, stiffness, swelling, muscle pain  Neurological: dizziness, headaches, numbness/tingling  Dermatological: lumps and rash    Past History:    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 Nontoxic and in no distress.   HEENT: Head: atraumatic, normocephalic. Sclerae anicteric.  Chest:  Normal respiratory effort  Cardiac:  No edema.   Abdomen: abdomen is non-distended  Extremities: normal tone and muscle bulk.  Skin: no lesions or rash on visible skin. Warm and dry.   CNS: alert and oriented. Grossly non-focal.   Psychiatric: normal mood and affect.     Lab Results:    Recent Results (from the past 168 hour(s))   Comprehensive metabolic panel   Result Value Ref Range    Sodium 140 135 - 145 mmol/L    Potassium 3.8 3.4 - 5.3 mmol/L    Carbon Dioxide (CO2) 25 22 - 29 mmol/L    Anion Gap 10 7 - 15 mmol/L    Urea Nitrogen 12.9 8.0 - 23.0 mg/dL    Creatinine 1.25 (H) 0.67 - 1.17 mg/dL    GFR Estimate 66 >60 mL/min/1.73m2    Calcium 9.2 8.8 - 10.2 mg/dL    Chloride 105 98 - 107 mmol/L    Glucose 136 (H) 70 - 99 mg/dL    Alkaline Phosphatase 68 40 - 150 U/L    AST 16 0 - 45 U/L    " ALT 12 0 - 70 U/L    Protein Total 6.1 (L) 6.4 - 8.3 g/dL    Albumin 4.3 3.5 - 5.2 g/dL    Bilirubin Total 0.3 <=1.2 mg/dL   Lactate Dehydrogenase   Result Value Ref Range    Lactate Dehydrogenase 147 0 - 250 U/L   CBC with platelets and differential   Result Value Ref Range    WBC Count 4.0 4.0 - 11.0 10e3/uL    RBC Count 3.79 (L) 4.40 - 5.90 10e6/uL    Hemoglobin 12.5 (L) 13.3 - 17.7 g/dL    Hematocrit 36.1 (L) 40.0 - 53.0 %    MCV 95 78 - 100 fL    MCH 33.0 26.5 - 33.0 pg    MCHC 34.6 31.5 - 36.5 g/dL    RDW 13.4 10.0 - 15.0 %    Platelet Count 94 (L) 150 - 450 10e3/uL    % Neutrophils 69 %    % Lymphocytes 19 %    % Monocytes 9 %    % Eosinophils 2 %    % Basophils 1 %    % Immature Granulocytes 0 %    NRBCs per 100 WBC 0 <1 /100    Absolute Neutrophils 2.8 1.6 - 8.3 10e3/uL    Absolute Lymphocytes 0.8 0.8 - 5.3 10e3/uL    Absolute Monocytes 0.4 0.0 - 1.3 10e3/uL    Absolute Eosinophils 0.1 0.0 - 0.7 10e3/uL    Absolute Basophils 0.0 0.0 - 0.2 10e3/uL    Absolute Immature Granulocytes 0.0 <=0.4 10e3/uL    Absolute NRBCs 0.0 10e3/uL     Imaging:    No results found.      Signed by: Rob Crooks MD

## 2024-05-06 DIAGNOSIS — C82.00 FOLLICULAR LYMPHOMA GRADE I, UNSPECIFIED BODY REGION (H): Primary | ICD-10-CM

## 2024-05-07 ENCOUNTER — LAB (OUTPATIENT)
Dept: INFUSION THERAPY | Facility: HOSPITAL | Age: 59
End: 2024-05-07
Attending: INTERNAL MEDICINE
Payer: COMMERCIAL

## 2024-05-07 ENCOUNTER — ONCOLOGY VISIT (OUTPATIENT)
Dept: ONCOLOGY | Facility: HOSPITAL | Age: 59
End: 2024-05-07
Attending: INTERNAL MEDICINE
Payer: COMMERCIAL

## 2024-05-07 VITALS
HEIGHT: 68 IN | HEART RATE: 60 BPM | OXYGEN SATURATION: 98 % | SYSTOLIC BLOOD PRESSURE: 148 MMHG | TEMPERATURE: 98.2 F | BODY MASS INDEX: 33.99 KG/M2 | RESPIRATION RATE: 16 BRPM | WEIGHT: 224.3 LBS | DIASTOLIC BLOOD PRESSURE: 68 MMHG

## 2024-05-07 DIAGNOSIS — D80.6 IMMUNODEFICIENCY DISORDER DUE TO ANTIBODY DEFICIENCY (H): ICD-10-CM

## 2024-05-07 DIAGNOSIS — C82.00 FOLLICULAR LYMPHOMA GRADE I, UNSPECIFIED BODY REGION (H): ICD-10-CM

## 2024-05-07 DIAGNOSIS — C82.08 FOLLICULAR LYMPHOMA GRADE I, LYMPH NODES OF MULTIPLE SITES (H): Primary | ICD-10-CM

## 2024-05-07 DIAGNOSIS — C82.18 GRADE 2 FOLLICULAR LYMPHOMA OF LYMPH NODES OF MULTIPLE REGIONS (H): Primary | ICD-10-CM

## 2024-05-07 LAB
ALBUMIN SERPL BCG-MCNC: 4.3 G/DL (ref 3.5–5.2)
ALP SERPL-CCNC: 68 U/L (ref 40–150)
ALT SERPL W P-5'-P-CCNC: 12 U/L (ref 0–70)
ANION GAP SERPL CALCULATED.3IONS-SCNC: 10 MMOL/L (ref 7–15)
AST SERPL W P-5'-P-CCNC: 16 U/L (ref 0–45)
BASOPHILS # BLD AUTO: 0 10E3/UL (ref 0–0.2)
BASOPHILS NFR BLD AUTO: 1 %
BILIRUB SERPL-MCNC: 0.3 MG/DL
BUN SERPL-MCNC: 12.9 MG/DL (ref 8–23)
CALCIUM SERPL-MCNC: 9.2 MG/DL (ref 8.8–10.2)
CD19 CELLS # BLD: 140 CELLS/UL (ref 107–698)
CD19 CELLS NFR BLD: 16 % (ref 6–27)
CD3 CELLS # BLD: 310 CELLS/UL (ref 603–2990)
CD3 CELLS NFR BLD: 36 % (ref 49–84)
CD3+CD4+ CELLS # BLD: 151 CELLS/UL (ref 441–2156)
CD3+CD4+ CELLS NFR BLD: 18 % (ref 28–63)
CD3+CD4+ CELLS/CD3+CD8+ CLL BLD: 1.02 % (ref 1.4–2.6)
CD3+CD8+ CELLS # BLD: 148 CELLS/UL (ref 125–1312)
CD3+CD8+ CELLS NFR BLD: 17 % (ref 10–40)
CD3-CD16+CD56+ CELLS # BLD: 395 CELLS/UL (ref 95–640)
CD3-CD16+CD56+ CELLS NFR BLD: 46 % (ref 4–25)
CHLORIDE SERPL-SCNC: 105 MMOL/L (ref 98–107)
CREAT SERPL-MCNC: 1.25 MG/DL (ref 0.67–1.17)
DEPRECATED HCO3 PLAS-SCNC: 25 MMOL/L (ref 22–29)
EGFRCR SERPLBLD CKD-EPI 2021: 66 ML/MIN/1.73M2
EOSINOPHIL # BLD AUTO: 0.1 10E3/UL (ref 0–0.7)
EOSINOPHIL NFR BLD AUTO: 2 %
ERYTHROCYTE [DISTWIDTH] IN BLOOD BY AUTOMATED COUNT: 13.4 % (ref 10–15)
GLUCOSE SERPL-MCNC: 136 MG/DL (ref 70–99)
HCT VFR BLD AUTO: 36.1 % (ref 40–53)
HGB BLD-MCNC: 12.5 G/DL (ref 13.3–17.7)
IMM GRANULOCYTES # BLD: 0 10E3/UL
IMM GRANULOCYTES NFR BLD: 0 %
LDH SERPL L TO P-CCNC: 147 U/L (ref 0–250)
LYMPHOCYTES # BLD AUTO: 0.8 10E3/UL (ref 0.8–5.3)
LYMPHOCYTES NFR BLD AUTO: 19 %
MCH RBC QN AUTO: 33 PG (ref 26.5–33)
MCHC RBC AUTO-ENTMCNC: 34.6 G/DL (ref 31.5–36.5)
MCV RBC AUTO: 95 FL (ref 78–100)
MONOCYTES # BLD AUTO: 0.4 10E3/UL (ref 0–1.3)
MONOCYTES NFR BLD AUTO: 9 %
NEUTROPHILS # BLD AUTO: 2.8 10E3/UL (ref 1.6–8.3)
NEUTROPHILS NFR BLD AUTO: 69 %
NRBC # BLD AUTO: 0 10E3/UL
NRBC BLD AUTO-RTO: 0 /100
PLATELET # BLD AUTO: 94 10E3/UL (ref 150–450)
POTASSIUM SERPL-SCNC: 3.8 MMOL/L (ref 3.4–5.3)
PROT SERPL-MCNC: 6.1 G/DL (ref 6.4–8.3)
RBC # BLD AUTO: 3.79 10E6/UL (ref 4.4–5.9)
SODIUM SERPL-SCNC: 140 MMOL/L (ref 135–145)
T CELL EXTENDED COMMENT: ABNORMAL
WBC # BLD AUTO: 4 10E3/UL (ref 4–11)

## 2024-05-07 PROCEDURE — 85025 COMPLETE CBC W/AUTO DIFF WBC: CPT

## 2024-05-07 PROCEDURE — 86359 T CELLS TOTAL COUNT: CPT

## 2024-05-07 PROCEDURE — G0463 HOSPITAL OUTPT CLINIC VISIT: HCPCS | Performed by: INTERNAL MEDICINE

## 2024-05-07 PROCEDURE — G2211 COMPLEX E/M VISIT ADD ON: HCPCS | Performed by: INTERNAL MEDICINE

## 2024-05-07 PROCEDURE — 99214 OFFICE O/P EST MOD 30 MIN: CPT | Performed by: INTERNAL MEDICINE

## 2024-05-07 PROCEDURE — 83615 LACTATE (LD) (LDH) ENZYME: CPT

## 2024-05-07 PROCEDURE — 250N000011 HC RX IP 250 OP 636

## 2024-05-07 PROCEDURE — 84295 ASSAY OF SERUM SODIUM: CPT

## 2024-05-07 PROCEDURE — 36591 DRAW BLOOD OFF VENOUS DEVICE: CPT

## 2024-05-07 RX ORDER — HEPARIN SODIUM (PORCINE) LOCK FLUSH IV SOLN 100 UNIT/ML 100 UNIT/ML
5 SOLUTION INTRAVENOUS
Status: DISCONTINUED | OUTPATIENT
Start: 2024-05-07 | End: 2024-05-07 | Stop reason: HOSPADM

## 2024-05-07 RX ORDER — HEPARIN SODIUM,PORCINE 10 UNIT/ML
5 VIAL (ML) INTRAVENOUS
Status: CANCELLED | OUTPATIENT
Start: 2024-05-07

## 2024-05-07 RX ORDER — HEPARIN SODIUM (PORCINE) LOCK FLUSH IV SOLN 100 UNIT/ML 100 UNIT/ML
5 SOLUTION INTRAVENOUS
Status: CANCELLED | OUTPATIENT
Start: 2024-05-07

## 2024-05-07 RX ORDER — HEPARIN SODIUM (PORCINE) LOCK FLUSH IV SOLN 100 UNIT/ML 100 UNIT/ML
300-600 SOLUTION INTRAVENOUS
Status: ACTIVE | OUTPATIENT
Start: 2024-05-07

## 2024-05-07 RX ORDER — CODEINE PHOSPHATE AND GUAIFENESIN 10; 100 MG/5ML; MG/5ML
SOLUTION ORAL
COMMUNITY

## 2024-05-07 RX ORDER — ENZALUTAMIDE 40 MG/1
CAPSULE ORAL
COMMUNITY
Start: 2024-03-14

## 2024-05-07 RX ORDER — MEGESTROL ACETATE 20 MG/1
1 TABLET ORAL 2 TIMES DAILY
COMMUNITY
Start: 2024-04-15

## 2024-05-07 RX ORDER — MIRABEGRON 25 MG/1
25 TABLET, FILM COATED, EXTENDED RELEASE ORAL DAILY
COMMUNITY

## 2024-05-07 RX ORDER — RELUGOLIX 120 MG/1
TABLET, FILM COATED ORAL DAILY
COMMUNITY

## 2024-05-07 RX ADMIN — Medication 5 ML: at 10:04

## 2024-05-07 ASSESSMENT — PAIN SCALES - GENERAL: PAINLEVEL: MODERATE PAIN (5)

## 2024-05-07 NOTE — PROGRESS NOTES
"Oncology Rooming Note    May 7, 2024 10:41 AM   Juvenal Blake is a 58 year old male who presents for:    Chief Complaint   Patient presents with    Oncology Clinic Visit     4 month return visit with labs related to Follicular lymphoma grade I, unspecified body region.     Initial Vitals: BP (!) 148/68 (BP Location: Left arm, Patient Position: Sitting, Cuff Size: Adult Large)   Pulse 60   Temp 98.2  F (36.8  C) (Tympanic)   Resp 16   Ht 1.715 m (5' 7.52\")   Wt 101.7 kg (224 lb 4.8 oz)   SpO2 98%   BMI 34.59 kg/m   Estimated body mass index is 34.59 kg/m  as calculated from the following:    Height as of this encounter: 1.715 m (5' 7.52\").    Weight as of this encounter: 101.7 kg (224 lb 4.8 oz). Body surface area is 2.2 meters squared.  Moderate Pain (5) Comment: Data Unavailable   No LMP for male patient.  Allergies reviewed: Yes  Medications reviewed: Yes    Medications: MEDICATION REFILLS NEEDED TODAY. Provider was notified.  Pharmacy name entered into [employer]:    [id number] - [place], [place] - [address] [id number] - [place], [place] - [address] AT [facility] [address]    Frailty Screening:   Is the patient here for a new oncology consult visit in cancer care? 2. No      Clinical concerns: Refill for Bactrim.   Dr. Crooks was notified.      Arleth Mckee CMA              "

## 2024-05-07 NOTE — LETTER
5/7/2024         RE: Juvenal Blake  [address] [place] [address]        Dear Colleague,    Thank you for referring your patient, Juvenal Blake, to the [facility] [place]. Please see a copy of my visit note below.    [facility] Note    Patient: Juvenal Blake  MRN: [id number]  Date of Service: May 7, 2024        Assessment and Plan:    1.  Follicular lymphoma: No clinical evidence of recurrent disease today.  Labs reviewed and show an LDH of 147, White count 4.0, hemoglobin 12.5 and platelets 94,000.  These are generally stable going back 9 months or so.  Will continue with every 6-month visits with labs history and physical.  Will not plan on any routine imaging.  Could consider once a year or less frequently to make sure there is no evidence of recurrent lymphoma.     2.  Prostate cancer: Radiographic evidence of recurrence seen on PET scan from January 2024. Started enzalutamide and relugolix in February 2024.  Per the urology clinic notes his PSA on April 8, 2024 was undetectable.  Germline and somatic genetic testing was discussed by his urologist.  I do not have any results at this time.  Side effects are being managed by urology.  He is on Megace and mirabegron.     3.  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  IgG levels will be checked monthly at the [facility] [place] [facility] which is closer to his home.  Will work on making sure [facility] can give him IVIG when he needs it.      4.  Prophylaxis: He is still on acyclovir which we will continue for now.  He is also on Bactrim for PCP prophylaxis given low CD4 count.  Will check a CD4 count today.  If it is above 250 I think we can stop the Bactrim.      Medical decision Making:  I spent 35 minutes in the care of this patient today, which included time necessary for preparation for the  visit, face to face time with the patient, communication of recommendations to the care team, and documentation time.    ECOG Performance  1    Diagnosis:    1.  Follicular lymphoma: Diagnosed in April, 2010 . Stage IV diagnosis with bone marrow involvement.  Initial disease involved the cervical, supraclavicular, axillary, mesenteric mass, retroperitoneal nodes, and possibly the manubrium.  Relapse noted on imaging in December 2016.  Axillary node biopsy from February 2017 shows low-grade follicle center cell lymphoma.  Second relapse on imaging January 2019.  Repeat biopsy January 7, 2019 of a right medial thigh lymph node shows recurrent follicular lymphoma.  Grade 2.  Third relapse on imaging in May, 2020. Pathologic confirmation May 28, 2020 from a right axillary lymph node excisional biopsy.  Fourth relapse May 2021     2.  Prostate cancer:  Pathologic stage T3b prostate cancer.  Positive margins, multiple, and surgery.  3 lymph nodes were negative.  High risk for recurrent disease.  He is being followed at [facility].  Waldorf 4+3 = 7.  Tertiary Waldorf pattern 5 and preoperative PSA of 27.      Radiographic recurrence seen on PET scan from January 2024.  This showed uptake located between the rectum and lower postsurgical changes with an SUV of 10.6, measuring 22 x 15 mm.  Second area of uptake in the right retroperitoneum with a maximum SUV of 15.  Measures 13 x 16 cm.  He also had PSA worsening with a value of 5.46 in December 2023.  In December 2022 was 1.58.    3.  Hypogammaglobulinemia: He has received intermittent doses of IVIG.    Treatment:    Lymphoma:    He had 6 cycles of bendamustine and Rituxan completed in October, 2010.  He had 2 years of maintenance Rituxan therapy finishing in September, 2012.     Second course of bendamustine plus rituximab started February 20, 2017.  Cycle 6 was completed on July 11, 2017.  Then had maintenance rituximab through January, 2019.     O-CHOP started at  second relapse.  Cycle 1 given April 23, 2019.  Cycle 6 given August 16, 2019.  [facility]     Iidelalisib started October 21, 2020 at  [facility].  150 mg twice daily.       (6/28/21)  polatuzumab (12/21/21)     Axicabtagene ciloleucel (Yescarta) given 1/17/22.      Prostate:   Initial radical prostatectomy and bilateral lymph node dissection.  November 15, 2017.  Prostatic adenocarcinoma Pablo 4+3 = 7 with tertiary pattern 5.  Tumor involves 45% of total surface area.  Positive for extensive extraprostatic extension.  Positive for bilateral seminal vesicle invasion.  Multiple margins positive.  Lymphovascular invasion not identified.  Perineural invasion was noted. Lymph nodes negative. 3 were examined (2 right obturator and 1 left obturator).     Completed radiation to the prostate fossa and pelvic lymph nodes at [facility] early May 2018.  He received 4500 cGy in 25 fractions.  He then had 2340 cGy boost in 13 fractions to the prostatic fossa, for a total dose of 6240 cGy in 38 treatment fractions delivered over 54 days.  He did not receive hormonal therapy.    Relugolix and enzalutamide started for recurrent/metastatic disease in February 2024.    Interim History:    Juvenal returns today for follow-up visit.  I have not seen him in our clinic since May 2021.  Since then he was at the [facility].  He is now over 2 years out from his most recent treatment with CAR-T cell therapy.  He has been doing well.  Started on treatment for metastatic prostate cancer 2 months ago.  No acute complaints today.    Review of Systems:    As above in the history.     Review of Systems otherwise Negative for:  General: chills, fever or night sweats  Psychological: anxiety or depression  Ophthalmic: blurry vision, double vision or loss of vision, vision change  ENT: epistaxis, oral lesions, hearing changes  Hematological and Lymphatic: bleeding, bruising, jaundice, swollen  "lymph nodes  Endocrine: hot flashes, unexpected weight changes  Respiratory: cough, hemoptysis, orthopnea or shortness of breath/SAMS  Cardiovascular: chest pain, edema, palpitations or PND  Gastrointestinal: abdominal pain, blood in stools, change in bowel habits, constipation, diarrhea or nausea/vomiting  Genito-Urinary: change in urinary stream, incontinence, frequency/urgency  Musculoskeletal: joint pain, stiffness, swelling, muscle pain  Neurological: dizziness, headaches, numbness/tingling  Dermatological: lumps and rash    Past History:    Past Medical History:   Diagnosis Date     Arthritis     shoulder per H & P     Cancer (H)      GERD (gastroesophageal reflux disease)      H/O pyloric stenosis     as an infant per H & P      Inguinal hernia     per H & P      Lazy eye     per H & P      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 Nontoxic and in no distress.   HEENT: Head: atraumatic, normocephalic. Sclerae anicteric.  Chest:  Normal respiratory effort  Cardiac:  No edema.   Abdomen: abdomen is non-distended  Extremities: normal tone and muscle bulk.  Skin: no lesions or rash on visible skin. Warm and dry.   CNS: alert and oriented. Grossly non-focal.   Psychiatric: normal mood and affect.     Lab Results:    Recent Results (from the past 168 hour(s))   Comprehensive metabolic panel   Result Value Ref Range    Sodium 140 135 - 145 mmol/L    Potassium 3.8 3.4 - 5.3 mmol/L    Carbon Dioxide (CO2) 25 22 - 29 mmol/L    Anion Gap 10 7 - 15 mmol/L    Urea " "Nitrogen 12.9 8.0 - 23.0 mg/dL    Creatinine 1.25 (H) 0.67 - 1.17 mg/dL    GFR Estimate 66 >60 mL/min/1.73m2    Calcium 9.2 8.8 - 10.2 mg/dL    Chloride 105 98 - 107 mmol/L    Glucose 136 (H) 70 - 99 mg/dL    Alkaline Phosphatase 68 40 - 150 U/L    AST 16 0 - 45 U/L    ALT 12 0 - 70 U/L    Protein Total 6.1 (L) 6.4 - 8.3 g/dL    Albumin 4.3 3.5 - 5.2 g/dL    Bilirubin Total 0.3 <=1.2 mg/dL   Lactate Dehydrogenase   Result Value Ref Range    Lactate Dehydrogenase 147 0 - 250 U/L   CBC with platelets and differential   Result Value Ref Range    WBC Count 4.0 4.0 - 11.0 10e3/uL    RBC Count 3.79 (L) 4.40 - 5.90 10e6/uL    Hemoglobin 12.5 (L) 13.3 - 17.7 g/dL    Hematocrit 36.1 (L) 40.0 - 53.0 %    MCV 95 78 - 100 fL    MCH 33.0 26.5 - 33.0 pg    MCHC 34.6 31.5 - 36.5 g/dL    RDW 13.4 10.0 - 15.0 %    Platelet Count 94 (L) 150 - 450 10e3/uL    % Neutrophils 69 %    % Lymphocytes 19 %    % Monocytes 9 %    % Eosinophils 2 %    % Basophils 1 %    % Immature Granulocytes 0 %    NRBCs per 100 WBC 0 <1 /100    Absolute Neutrophils 2.8 1.6 - 8.3 10e3/uL    Absolute Lymphocytes 0.8 0.8 - 5.3 10e3/uL    Absolute Monocytes 0.4 0.0 - 1.3 10e3/uL    Absolute Eosinophils 0.1 0.0 - 0.7 10e3/uL    Absolute Basophils 0.0 0.0 - 0.2 10e3/uL    Absolute Immature Granulocytes 0.0 <=0.4 10e3/uL    Absolute NRBCs 0.0 10e3/uL     Imaging:    No results found.      Signed by: Rob Crooks MD      Oncology Rooming Note    May 7, 2024 10:41 AM   Juvenal Blake is a 58 year old male who presents for:    Chief Complaint   Patient presents with     Oncology Clinic Visit     4 month return visit with labs related to Follicular lymphoma grade I, unspecified body region.     Initial Vitals: BP (!) 148/68 (BP Location: Left arm, Patient Position: Sitting, Cuff Size: Adult Large)   Pulse 60   Temp 98.2  F (36.8  C) (Tympanic)   Resp 16   Ht 1.715 m (5' 7.52\")   Wt 101.7 kg (224 lb 4.8 oz)   SpO2 98%   BMI 34.59 kg/m   Estimated body mass index " "is 34.59 kg/m  as calculated from the following:    Height as of this encounter: 1.715 m (5' 7.52\").    Weight as of this encounter: 101.7 kg (224 lb 4.8 oz). Body surface area is 2.2 meters squared.  Moderate Pain (5) Comment: Data Unavailable   No LMP for male patient.  Allergies reviewed: Yes  Medications reviewed: Yes    Medications: MEDICATION REFILLS NEEDED TODAY. Provider was notified.  Pharmacy name entered into [facility]:    [id number] - [place], [place] - [address] [id number] - [place], [place] - [address] AT [facility] [address]    Frailty Screening:   Is the patient here for a new oncology consult visit in cancer care? 2. No      Clinical concerns: Refill for Bactrim.   Dr. Crooks was notified.      Arleth Mckee CMA                Again, thank you for allowing me to participate in the care of your patient.        Sincerely,        Rob Crooks MD  "

## 2024-05-09 ENCOUNTER — LAB (OUTPATIENT)
Dept: LAB | Facility: CLINIC | Age: 59
End: 2024-05-09
Payer: COMMERCIAL

## 2024-05-09 ENCOUNTER — TELEPHONE (OUTPATIENT)
Dept: ONCOLOGY | Facility: HOSPITAL | Age: 59
End: 2024-05-09

## 2024-05-09 DIAGNOSIS — D80.6 IMMUNODEFICIENCY DISORDER DUE TO ANTIBODY DEFICIENCY (H): ICD-10-CM

## 2024-05-09 PROCEDURE — 36415 COLL VENOUS BLD VENIPUNCTURE: CPT

## 2024-05-09 PROCEDURE — 82784 ASSAY IGA/IGD/IGG/IGM EACH: CPT

## 2024-05-10 ENCOUNTER — PATIENT OUTREACH (OUTPATIENT)
Dept: ONCOLOGY | Facility: HOSPITAL | Age: 59
End: 2024-05-10
Payer: COMMERCIAL

## 2024-05-10 DIAGNOSIS — D80.1 HYPOGAMMAGLOBULINEMIA (H): Primary | ICD-10-CM

## 2024-05-10 LAB — IGG SERPL-MCNC: 366 MG/DL (ref 610–1616)

## 2024-05-10 RX ORDER — DIPHENHYDRAMINE HYDROCHLORIDE 50 MG/ML
50 INJECTION INTRAMUSCULAR; INTRAVENOUS
Start: 2025-05-01

## 2024-05-10 RX ORDER — ACETAMINOPHEN 325 MG/1
650 TABLET ORAL ONCE
OUTPATIENT
Start: 2025-05-01

## 2024-05-10 RX ORDER — ALBUTEROL SULFATE 90 UG/1
1-2 AEROSOL, METERED RESPIRATORY (INHALATION)
Start: 2025-05-01

## 2024-05-10 RX ORDER — EPINEPHRINE 1 MG/ML
0.3 INJECTION, SOLUTION INTRAMUSCULAR; SUBCUTANEOUS EVERY 5 MIN PRN
OUTPATIENT
Start: 2025-05-01

## 2024-05-10 RX ORDER — HEPARIN SODIUM (PORCINE) LOCK FLUSH IV SOLN 100 UNIT/ML 100 UNIT/ML
5 SOLUTION INTRAVENOUS
OUTPATIENT
Start: 2025-05-01

## 2024-05-10 RX ORDER — ALBUTEROL SULFATE 0.83 MG/ML
2.5 SOLUTION RESPIRATORY (INHALATION)
OUTPATIENT
Start: 2025-05-01

## 2024-05-10 RX ORDER — NALOXONE HYDROCHLORIDE 0.4 MG/ML
0.2 INJECTION, SOLUTION INTRAMUSCULAR; INTRAVENOUS; SUBCUTANEOUS
OUTPATIENT
Start: 2025-05-01

## 2024-05-10 RX ORDER — DIPHENHYDRAMINE HCL 25 MG
50 CAPSULE ORAL ONCE
OUTPATIENT
Start: 2025-05-01

## 2024-05-10 RX ORDER — HEPARIN SODIUM,PORCINE 10 UNIT/ML
5 VIAL (ML) INTRAVENOUS
OUTPATIENT
Start: 2025-05-01

## 2024-05-10 RX ORDER — METHYLPREDNISOLONE SODIUM SUCCINATE 125 MG/2ML
125 INJECTION, POWDER, LYOPHILIZED, FOR SOLUTION INTRAMUSCULAR; INTRAVENOUS
Start: 2025-05-01

## 2024-05-10 RX ORDER — MEPERIDINE HYDROCHLORIDE 25 MG/ML
25 INJECTION INTRAMUSCULAR; INTRAVENOUS; SUBCUTANEOUS EVERY 30 MIN PRN
OUTPATIENT
Start: 2025-05-01

## 2024-05-10 NOTE — PROGRESS NOTES
[facility]: Cancer Care                                                                                          Sent an IB to the [facility] referral pool to schedule monthly lab appts to check IGG levels at the [place] [facility].     Signature:  Lexi Martin RN

## 2024-05-13 ENCOUNTER — PATIENT OUTREACH (OUTPATIENT)
Dept: CARE COORDINATION | Facility: CLINIC | Age: 59
End: 2024-05-13
Payer: COMMERCIAL

## 2024-05-13 NOTE — PROGRESS NOTES
"Social Work - Distress Screen Intervention  [facility]    Identified Concern and Score from Distress Screenin. How concerned are you about your ability to eat? (!) 8     2. How concerned are you about unintended weight loss or your current weight? (!) 8     3. How concerned are you about feeling depressed or very sad?  (!) 8     4. How concerned are you about feeling anxious or very scared?  (!) 9     5. Do you struggle with the loss of meaning and karo in your life?  Not at all     6. How concerned are you about work and home life issues that may be affected by your cancer?  (!) 8     7. How concerned are you about knowing what resources are available to help you?  (!) 8     8. Do you currently have what you would describe as Episcopal or spiritual struggles? Not at all     9. If you want to be contacted by one of our professionals, I can send a message to them right now.  No data recorded     Date of Distress Screen: 24  Data: At time of last visit, patient scored positive on distress screening.  outreached to patient today to follow up on elevated distress and introduce psychosocial services and support.  Intervention/Education provided:  contacted patient by phone to discuss distress screening results.     Juvenal reports that he had a positive visit with Dr. Crooks, reports that he is relieved to be in remission at present time. Juvenal endorses that he \"thinks about cancer at times\" and this makes him depressed, but denies interest in resource support at present. Receptive to this clinician sending [employer] message with social work contact information and survivorship webpage for support if desired.     Follow-up Required:  will remain available to patient for support as needed    CITLALLI Mora, LICSW, OSW-C  Clinical - Adult Oncology  Phone: [phone]  She/Her/Hers  [facility]: Tiffanie GONZALEZ  " 8am-4:30pm  [facility]: EDILMA Parekh F 8am-4:30pm   Support Groups at [facility]: Social Work Services for Cancer Patients ([email or web address])

## 2024-05-14 ENCOUNTER — DOCUMENTATION ONLY (OUTPATIENT)
Dept: PHARMACY | Facility: CLINIC | Age: 59
End: 2024-05-14
Payer: COMMERCIAL

## 2024-05-14 NOTE — PROGRESS NOTES
Skilled Nurse visit in the Patient Home to administer  Privigen 35 g in 350 mL of diluent IV via CADD pump over approximately 3 hours once if IgG level <400 mg/dL..  No recent elevated temperature, fever, chills, productive cough, coughing for 3 weeks or longer or hemoptysis, abnormal vital signs, night sweats, chest pain. No  decrease in your appetite, unexplained weight loss or fatigue.  No other new onset medical symptoms.  Current weight 236 lbs.  [id number]  chest , 1attempt Pre medicated with Acetaminophen 650mg by mouth, Diphenhydramine 50mg by mouth.Infusion completed without complication or reaction. Pt reports therapy iseffective in managing symptoms related to therapy. Latonya Weinstein RN BSN

## 2024-05-16 DIAGNOSIS — D72.818 LOW CD4 CELL COUNT DETERMINED BY FLOW CYTOMETRY: ICD-10-CM

## 2024-05-16 DIAGNOSIS — C82.00 FOLLICULAR LYMPHOMA GRADE I, UNSPECIFIED BODY REGION (H): ICD-10-CM

## 2024-05-16 RX ORDER — SULFAMETHOXAZOLE/TRIMETHOPRIM 800-160 MG
TABLET ORAL
Qty: 16 TABLET | Refills: 0 | Status: SHIPPED | OUTPATIENT
Start: 2024-05-16 | End: 2024-06-17

## 2024-05-20 ENCOUNTER — PATIENT OUTREACH (OUTPATIENT)
Dept: ONCOLOGY | Facility: HOSPITAL | Age: 59
End: 2024-05-20
Payer: COMMERCIAL

## 2024-05-20 NOTE — PROGRESS NOTES
[facility]: Cancer Care                                                                                          Per [facility] nurse, patient is currently receiving IVIG at home, received recent dose on 5/14 and will receive the next dose on 6/11 pending 6/4 IVIG lab result.     Signature:  Lexi Martin RN

## 2024-05-20 NOTE — PROGRESS NOTES
[facility]: Cancer Care                                                                                          Sent an IB message to [facility] to see if they can coordinate giving IVIG to the patient via home.     Signature:  Lexi Martin RN

## 2024-05-29 ENCOUNTER — MEDICAL CORRESPONDENCE (OUTPATIENT)
Dept: HEALTH INFORMATION MANAGEMENT | Facility: CLINIC | Age: 59
End: 2024-05-29
Payer: COMMERCIAL

## 2024-05-29 ENCOUNTER — LAB REQUISITION (OUTPATIENT)
Dept: LAB | Facility: CLINIC | Age: 59
End: 2024-05-29

## 2024-05-29 ENCOUNTER — TRANSFERRED RECORDS (OUTPATIENT)
Dept: HEALTH INFORMATION MANAGEMENT | Facility: CLINIC | Age: 59
End: 2024-05-29
Payer: COMMERCIAL

## 2024-05-29 PROCEDURE — 80061 LIPID PANEL: CPT | Performed by: FAMILY MEDICINE

## 2024-05-30 ENCOUNTER — TRANSCRIBE ORDERS (OUTPATIENT)
Dept: OTHER | Age: 59
End: 2024-05-30

## 2024-05-30 DIAGNOSIS — K40.90 LEFT INGUINAL HERNIA: Primary | ICD-10-CM

## 2024-05-30 LAB
CHOLEST SERPL-MCNC: 237 MG/DL
FASTING STATUS PATIENT QL REPORTED: ABNORMAL
HDLC SERPL-MCNC: 53 MG/DL
LDLC SERPL CALC-MCNC: 126 MG/DL
NONHDLC SERPL-MCNC: 184 MG/DL
TRIGL SERPL-MCNC: 288 MG/DL

## 2024-05-31 NOTE — TELEPHONE ENCOUNTER
REFERRAL INFORMATION:  Referring Provider: Dr. Maynard  Referring Clinic: [facility]  Reason for Visit/Diagnosis: Left Inguinal Hernia       FUTURE VISIT INFORMATION:  Appointment Date: 6/11/2024  Appointment Time: 11:30 AM     NOTES RECORD STATUS  DETAILS   OFFICE NOTE from Referring Provider Received [facility]:  5/29/24 - PCC OV with Dr. Abbey Maynard   OFFICE NOTE from [facility] Internal [facility]:  5/7/24 - ONC OV with Dr. Crooks   HOSPITAL DISCHARGE SUMMARY/ ED VISITS  N/A    OPERATIVE REPORT N/A    PERTINENT LABS Received / Internal    IMAGING (CT, MRI, US, XR)  Received / Internal [facility]:  1/17/24 - PET/CT  6/13/23 - CT Abd/Pelvis    [facility]:  12/22/23 - CT Chest/Abd/Pelvis     Records Requested    Facility  [facility]  Fax: [phone]    Outcome * 5/31/24 7:59 AM Records received from [facility] and sent to HIM to be scanned into the chart. - Merle

## 2024-06-04 ENCOUNTER — TRANSCRIBE ORDERS (OUTPATIENT)
Dept: OTHER | Age: 59
End: 2024-06-04

## 2024-06-04 ENCOUNTER — LAB (OUTPATIENT)
Dept: LAB | Facility: CLINIC | Age: 59
End: 2024-06-04
Payer: COMMERCIAL

## 2024-06-04 DIAGNOSIS — E78.1 HYPERTRIGLYCERIDEMIA: Primary | ICD-10-CM

## 2024-06-04 DIAGNOSIS — D80.6 IMMUNODEFICIENCY DISORDER DUE TO ANTIBODY DEFICIENCY (H): ICD-10-CM

## 2024-06-04 PROCEDURE — 82784 ASSAY IGA/IGD/IGG/IGM EACH: CPT

## 2024-06-05 LAB — IGG SERPL-MCNC: 565 MG/DL (ref 610–1616)

## 2024-06-11 ENCOUNTER — PRE VISIT (OUTPATIENT)
Dept: SURGERY | Facility: CLINIC | Age: 59
End: 2024-06-11

## 2024-06-11 ENCOUNTER — OFFICE VISIT (OUTPATIENT)
Dept: SURGERY | Facility: CLINIC | Age: 59
End: 2024-06-11
Attending: FAMILY MEDICINE
Payer: COMMERCIAL

## 2024-06-11 VITALS
DIASTOLIC BLOOD PRESSURE: 97 MMHG | HEIGHT: 68 IN | WEIGHT: 221.6 LBS | OXYGEN SATURATION: 98 % | HEART RATE: 56 BPM | SYSTOLIC BLOOD PRESSURE: 156 MMHG | BODY MASS INDEX: 33.59 KG/M2

## 2024-06-11 DIAGNOSIS — K40.20 BILATERAL INGUINAL HERNIA WITHOUT OBSTRUCTION OR GANGRENE, RECURRENCE NOT SPECIFIED: Primary | ICD-10-CM

## 2024-06-11 PROCEDURE — 99204 OFFICE O/P NEW MOD 45 MIN: CPT | Performed by: SURGERY

## 2024-06-11 RX ORDER — ATORVASTATIN CALCIUM 10 MG/1
TABLET, FILM COATED ORAL
COMMUNITY
Start: 2024-05-30

## 2024-06-11 ASSESSMENT — PAIN SCALES - GENERAL: PAINLEVEL: NO PAIN (0)

## 2024-06-11 NOTE — NURSING NOTE
"Chief Complaint   Patient presents with    New Patient     Left inguinal hernia       Vitals:    06/11/24 1110   BP: (!) 156/97   BP Location: Left arm   Patient Position: Sitting   Cuff Size: Adult Regular   Pulse: 56   SpO2: 98%   Weight: 100.5 kg (221 lb 9.6 oz)   Height: 1.715 m (5' 7.5\")       Body mass index is 34.2 kg/m .                          Ry Gagnon, EMT    "

## 2024-06-11 NOTE — PROGRESS NOTES
Juvenal Blake is a 58 year old male with a 1 month history of a left inguinal mass with the following symptoms of lump.  Found to have BIH on CT 12/23.  Onset did not occur with lifting.  Obstructive symptoms:  no  Urinary difficulties:  no  Chronic cough: no  Constipation:  no  Current level of activity:  Low, retired.    Past medical and surgical history, medications, allergies, family history, and social history were reviewed with the patient. Had elective repair about 8 years ago. Records not found.   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)     Low serum IgG for age     Non Hodgkin's lymphoma (H)     Non Hodgkin's lymphoma (H)     Prostate CA (H)     Shortness of breath     Sleep apnea     CPAP     Past Surgical History:   Procedure Laterality Date    ABDOMEN SURGERY      for pyloric stenosis as an infant    COLONOSCOPY      DISTAL CLAVICLE EXCISION      per H & P     HERNIA REPAIR      inguinal    HERNIA REPAIR, UMBILICAL      INSERT PICC LINE N/A 08/24/2021    Procedure: INSERTION, PICC EXCHANGE, PREVIOUS PICC IS OUT 10 CENTIMETERS;  Surgeon: Christiano Murdock MD;  Location: [facility] OR    IR CHEST PORT PLACEMENT > 5 YRS OF AGE  02/17/2017    IR LYMPH NODE BIOPSY  10/01/2020    IR LYMPH NODE BIOPSY  05/28/2021    IR LYMPH NODE BIOPSY  12/20/2021    IR PICC PLACEMENT > 5 YRS OF AGE  08/24/2021    LAPAROSCOPIC HERNIORRHAPHY INCISIONAL Right 05/28/2020    Procedure: HERNIORRHAPHY, UMBILICAL, LAPAROSCOPIC; AXILLARY LYMPH NODE BIOPSY;  Surgeon: Rob Farrell MD;  Location: [facility];  Service: General    OTHER SURGICAL HISTORY Left     shoulder arthroscopy    OTHER SURGICAL HISTORY  01/01/2017    port a cath placement    PICC DOUBLE LUMEN PLACEMENT Right 06/27/2021     47cm (3cm external), Basilic vein    PICC DOUBLE LUMEN PLACEMENT Left 01/17/2022    46 cm 5 fr dl Bard PICC    HI LAP,PROSTATECTOMY,RADICAL,W/NERVE SPARE,INCL ROBOTIC N/A 11/15/2017    Procedure: ROBOTIC ASSISTED RADICAL RETROPUBIC PROSTATECTOMY BILATERAL PELVIC LYMPH NODE DISSECTION ;  Surgeon: Ezio Steele MD;  Location: [facility];  Service: Urology    TONSILLECTOMY      US LYMPH NODE BIOPSY  01/07/2019       ROS: 10 point review of systems negative except noted in HPI  PHYSICAL EXAM  General appearance- alert, and in no distress.  Lungs- Respiratory effort unlabored.  Gait- Normal.  BMI 34  Abdomen - soft non distended, non tender .  BIH, L>R.    CT reviewed.    Impression: Recurrent BIH. Options include:  Watchful waiting vs open mesh repair.    A full discussion regarding the alternatives of watchful waiting vs surgery to repair,  Risks, goals, and potential complications for surgery was completed today.  The patient understood I would have to use mesh and  that the potential problems included but are not limited to:  Infection, bleeding, hematoma, seroma, recurrence, injury to nerve/muscle or involved testicle(if male), ischemic orchitis(if male), and chronic pain.    The patient verbally expressed understanding, was given the opportunity for questions, and would like to hold off on the surgery. He will contact us should he wish to proceed with the surgery.      The total time spent with this patient was 45 minutes.  The total time was spent on the date of encounter doing chart review, history and physical, documentation, patient education, and any further activity as noted above.

## 2024-06-11 NOTE — LETTER
6/11/2024       RE: Juvenal Blake  [address] [place] [address]     Dear Colleague,    Thank you for referring your patient, Juvenal Blake, to the [facility] [place] [facility]. Please see a copy of my visit note below.    Juvenal Blake is a 58 year old male with a 1 month history of a left inguinal mass with the following symptoms of lump.  Found to have BIH on CT 12/23.  Onset did not occur with lifting.  Obstructive symptoms:  no  Urinary difficulties:  no  Chronic cough: no  Constipation:  no  Current level of activity:  Low, retired.    Past medical and surgical history, medications, allergies, family history, and social history were reviewed with the patient. Had elective repair about 8 years ago. Records not found.   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)     Low serum IgG for age     Non Hodgkin's lymphoma (H)     Non Hodgkin's lymphoma (H)     Prostate CA (H)     Shortness of breath     Sleep apnea     CPAP     Past Surgical History:   Procedure Laterality Date    ABDOMEN SURGERY      for pyloric stenosis as an infant    COLONOSCOPY      DISTAL CLAVICLE EXCISION      per H & P     HERNIA REPAIR      inguinal    HERNIA REPAIR, UMBILICAL      INSERT PICC LINE N/A 08/24/2021    Procedure: INSERTION, PICC EXCHANGE, PREVIOUS PICC IS OUT 10 CENTIMETERS;  Surgeon: Christiano Murdock MD;  Location: [facility] OR    IR CHEST PORT PLACEMENT > 5 YRS OF AGE  02/17/2017    IR LYMPH NODE BIOPSY  10/01/2020    IR LYMPH NODE BIOPSY  05/28/2021    IR LYMPH NODE BIOPSY  12/20/2021    IR PICC PLACEMENT > 5 YRS OF AGE  08/24/2021    LAPAROSCOPIC HERNIORRHAPHY INCISIONAL Right 05/28/2020     Procedure: HERNIORRHAPHY, UMBILICAL, LAPAROSCOPIC; AXILLARY LYMPH NODE BIOPSY;  Surgeon: Rob Farrell MD;  Location: [facility];  Service: General    OTHER SURGICAL HISTORY Left     shoulder arthroscopy    OTHER SURGICAL HISTORY  01/01/2017    port a cath placement    PICC DOUBLE LUMEN PLACEMENT Right 06/27/2021    47cm (3cm external), Basilic vein    PICC DOUBLE LUMEN PLACEMENT Left 01/17/2022    46 cm 5 fr dl Bard PICC    MA LAP,PROSTATECTOMY,RADICAL,W/NERVE SPARE,INCL ROBOTIC N/A 11/15/2017    Procedure: ROBOTIC ASSISTED RADICAL RETROPUBIC PROSTATECTOMY BILATERAL PELVIC LYMPH NODE DISSECTION ;  Surgeon: Ezio Steele MD;  Location: [facility];  Service: Urology    TONSILLECTOMY      US LYMPH NODE BIOPSY  01/07/2019       ROS: 10 point review of systems negative except noted in HPI  PHYSICAL EXAM  General appearance- alert, and in no distress.  Lungs- Respiratory effort unlabored.  Gait- Normal.  BMI 34  Abdomen - soft non distended, non tender .  BIH, L>R.    CT reviewed.    Impression: Recurrent BIH. Options include:  Watchful waiting vs open mesh repair.    A full discussion regarding the alternatives of watchful waiting vs surgery to repair,  Risks, goals, and potential complications for surgery was completed today.  The patient understood I would have to use mesh and  that the potential problems included but are not limited to:  Infection, bleeding, hematoma, seroma, recurrence, injury to nerve/muscle or involved testicle(if male), ischemic orchitis(if male), and chronic pain.    The patient verbally expressed understanding, was given the opportunity for questions, and would like to hold off on the surgery. He will contact us should he wish to proceed with the surgery.      The total time spent with this patient was 45 minutes.  The total time was spent on the date of encounter doing chart review, history and physical, documentation, patient education, and any further activity  as noted above.           Again, thank you for allowing me to participate in the care of your patient.      Sincerely,    Bebo Lema MD

## 2024-06-11 NOTE — PATIENT INSTRUCTIONS
You met with Dr. Bebo Lema.      Today's visit instructions:    You have recurrent bilateral inguinal hernias. Your options are open mesh repair vs watchful waiting. If you decide to proceed with surgery please call the Nurse Advice line listed below.        If you have questions please contact Maya RN or Fernanda RN during regular clinic hours, Monday through Friday 7:30 AM - 4:00 PM, or you can contact us via Nualight at anytime.       If you have urgent needs after-hours, weekends, or holidays please call the hospital at [phone] and ask to speak with our on-call General Surgery Team.    Appointment [phone]  Nurse Advice (Maya or Fernanda): [phone]   Surgery Scheduler (Zehra): [phone]  Fax: [phone]

## 2024-06-15 DIAGNOSIS — C82.00 FOLLICULAR LYMPHOMA GRADE I, UNSPECIFIED BODY REGION (H): ICD-10-CM

## 2024-06-15 DIAGNOSIS — D72.818 LOW CD4 CELL COUNT DETERMINED BY FLOW CYTOMETRY: ICD-10-CM

## 2024-06-17 RX ORDER — SULFAMETHOXAZOLE/TRIMETHOPRIM 800-160 MG
TABLET ORAL
Qty: 50 TABLET | Refills: 3 | Status: SHIPPED | OUTPATIENT
Start: 2024-06-17

## 2024-07-05 ENCOUNTER — LAB (OUTPATIENT)
Dept: LAB | Facility: CLINIC | Age: 59
End: 2024-07-05
Payer: COMMERCIAL

## 2024-07-05 DIAGNOSIS — C83.398 DIFFUSE LARGE B-CELL LYMPHOMA OF EXTRANODAL SITE EXCLUDING SPLEEN AND OTHER SOLID ORGANS: Primary | ICD-10-CM

## 2024-07-05 DIAGNOSIS — D80.1 HYPOGAMMAGLOBULINEMIA (H): ICD-10-CM

## 2024-07-05 DIAGNOSIS — C83.398 DIFFUSE LARGE B-CELL LYMPHOMA OF EXTRANODAL SITE EXCLUDING SPLEEN AND OTHER SOLID ORGANS: ICD-10-CM

## 2024-07-05 LAB
ALBUMIN SERPL BCG-MCNC: 4.5 G/DL (ref 3.5–5.2)
ALP SERPL-CCNC: 85 U/L (ref 40–150)
ALT SERPL W P-5'-P-CCNC: 24 U/L (ref 0–70)
ANION GAP SERPL CALCULATED.3IONS-SCNC: 10 MMOL/L (ref 7–15)
AST SERPL W P-5'-P-CCNC: 26 U/L (ref 0–45)
BASOPHILS # BLD AUTO: 0 10E3/UL (ref 0–0.2)
BASOPHILS NFR BLD AUTO: 0 %
BILIRUB SERPL-MCNC: 0.6 MG/DL
BUN SERPL-MCNC: 16.3 MG/DL (ref 8–23)
CALCIUM SERPL-MCNC: 9.8 MG/DL (ref 8.8–10.2)
CHLORIDE SERPL-SCNC: 103 MMOL/L (ref 98–107)
CREAT SERPL-MCNC: 1.05 MG/DL (ref 0.67–1.17)
DEPRECATED HCO3 PLAS-SCNC: 26 MMOL/L (ref 22–29)
EGFRCR SERPLBLD CKD-EPI 2021: 81 ML/MIN/1.73M2
EOSINOPHIL # BLD AUTO: 0.1 10E3/UL (ref 0–0.7)
EOSINOPHIL NFR BLD AUTO: 3 %
ERYTHROCYTE [DISTWIDTH] IN BLOOD BY AUTOMATED COUNT: 14.3 % (ref 10–15)
GLUCOSE SERPL-MCNC: 106 MG/DL (ref 70–99)
HCT VFR BLD AUTO: 34.3 % (ref 40–53)
HGB BLD-MCNC: 12 G/DL (ref 13.3–17.7)
IMM GRANULOCYTES # BLD: 0 10E3/UL
IMM GRANULOCYTES NFR BLD: 1 %
LDH SERPL L TO P-CCNC: 233 U/L (ref 0–250)
LYMPHOCYTES # BLD AUTO: 0.9 10E3/UL (ref 0.8–5.3)
LYMPHOCYTES NFR BLD AUTO: 19 %
MCH RBC QN AUTO: 34 PG (ref 26.5–33)
MCHC RBC AUTO-ENTMCNC: 35 G/DL (ref 31.5–36.5)
MCV RBC AUTO: 97 FL (ref 78–100)
MONOCYTES # BLD AUTO: 0.5 10E3/UL (ref 0–1.3)
MONOCYTES NFR BLD AUTO: 10 %
NEUTROPHILS # BLD AUTO: 3 10E3/UL (ref 1.6–8.3)
NEUTROPHILS NFR BLD AUTO: 66 %
NRBC # BLD AUTO: 0 10E3/UL
NRBC BLD AUTO-RTO: 0 /100
PLATELET # BLD AUTO: 111 10E3/UL (ref 150–450)
POTASSIUM SERPL-SCNC: 4.6 MMOL/L (ref 3.4–5.3)
PROT SERPL-MCNC: 6.6 G/DL (ref 6.4–8.3)
RBC # BLD AUTO: 3.53 10E6/UL (ref 4.4–5.9)
SODIUM SERPL-SCNC: 139 MMOL/L (ref 135–145)
WBC # BLD AUTO: 4.5 10E3/UL (ref 4–11)

## 2024-07-05 PROCEDURE — 36415 COLL VENOUS BLD VENIPUNCTURE: CPT

## 2024-07-05 PROCEDURE — 82784 ASSAY IGA/IGD/IGG/IGM EACH: CPT

## 2024-07-05 PROCEDURE — 83615 LACTATE (LD) (LDH) ENZYME: CPT

## 2024-07-05 PROCEDURE — 85025 COMPLETE CBC W/AUTO DIFF WBC: CPT

## 2024-07-05 PROCEDURE — 80053 COMPREHEN METABOLIC PANEL: CPT

## 2024-07-08 ENCOUNTER — TRANSFERRED RECORDS (OUTPATIENT)
Dept: HEALTH INFORMATION MANAGEMENT | Facility: CLINIC | Age: 59
End: 2024-07-08
Payer: COMMERCIAL

## 2024-07-08 ENCOUNTER — LAB REQUISITION (OUTPATIENT)
Dept: LAB | Facility: CLINIC | Age: 59
End: 2024-07-08

## 2024-07-08 DIAGNOSIS — D80.1 NONFAMILIAL HYPOGAMMAGLOBULINEMIA (H): ICD-10-CM

## 2024-07-08 LAB — IGG SERPL-MCNC: 402 MG/DL (ref 610–1616)

## 2024-07-08 PROCEDURE — 82784 ASSAY IGA/IGD/IGG/IGM EACH: CPT | Performed by: FAMILY MEDICINE

## 2024-07-09 LAB — IGG SERPL-MCNC: 388 MG/DL (ref 610–1616)

## 2024-07-12 ENCOUNTER — DOCUMENTATION ONLY (OUTPATIENT)
Dept: PHARMACY | Facility: CLINIC | Age: 59
End: 2024-07-12
Payer: COMMERCIAL

## 2024-07-19 ENCOUNTER — TRANSFERRED RECORDS (OUTPATIENT)
Dept: HEALTH INFORMATION MANAGEMENT | Facility: CLINIC | Age: 59
End: 2024-07-19
Payer: COMMERCIAL

## 2024-08-01 ENCOUNTER — LAB REQUISITION (OUTPATIENT)
Dept: LAB | Facility: CLINIC | Age: 59
End: 2024-08-01

## 2024-08-01 DIAGNOSIS — Z85.72 PERSONAL HISTORY OF NON-HODGKIN LYMPHOMAS: ICD-10-CM

## 2024-08-01 PROCEDURE — 80048 BASIC METABOLIC PNL TOTAL CA: CPT | Performed by: PHYSICIAN ASSISTANT

## 2024-08-02 LAB
ANION GAP SERPL CALCULATED.3IONS-SCNC: 8 MMOL/L (ref 7–15)
BUN SERPL-MCNC: 14.1 MG/DL (ref 8–23)
CALCIUM SERPL-MCNC: 9.1 MG/DL (ref 8.8–10.4)
CHLORIDE SERPL-SCNC: 103 MMOL/L (ref 98–107)
CREAT SERPL-MCNC: 1.11 MG/DL (ref 0.67–1.17)
EGFRCR SERPLBLD CKD-EPI 2021: 76 ML/MIN/1.73M2
GLUCOSE SERPL-MCNC: 100 MG/DL (ref 70–99)
HCO3 SERPL-SCNC: 25 MMOL/L (ref 22–29)
POTASSIUM SERPL-SCNC: 4.2 MMOL/L (ref 3.4–5.3)
SODIUM SERPL-SCNC: 136 MMOL/L (ref 135–145)

## 2024-08-07 ENCOUNTER — PATIENT OUTREACH (OUTPATIENT)
Dept: ONCOLOGY | Facility: HOSPITAL | Age: 59
End: 2024-08-07
Payer: COMMERCIAL

## 2024-08-07 NOTE — PROGRESS NOTES
[facility]: Cancer Care                                                                                          Sent mychart message:    Ashish Sanford,    Wanted to reach out to see if you were going to schedule your monthly IGG lab check for the month of August?    Signature:  Lexi Martin RN

## 2024-08-13 RX ORDER — DOXYCYCLINE HYCLATE 100 MG/1
1 TABLET, DELAYED RELEASE ORAL 2 TIMES DAILY
COMMUNITY

## 2024-08-13 RX ORDER — SILDENAFIL CITRATE 20 MG/1
100 TABLET ORAL DAILY
COMMUNITY

## 2024-08-15 ENCOUNTER — ANESTHESIA (OUTPATIENT)
Dept: SURGERY | Facility: HOSPITAL | Age: 59
End: 2024-08-15
Payer: COMMERCIAL

## 2024-08-15 ENCOUNTER — ANESTHESIA EVENT (OUTPATIENT)
Dept: SURGERY | Facility: HOSPITAL | Age: 59
End: 2024-08-15
Payer: COMMERCIAL

## 2024-08-15 ENCOUNTER — HOSPITAL ENCOUNTER (OUTPATIENT)
Facility: HOSPITAL | Age: 59
Discharge: HOME | End: 2024-08-15
Attending: UROLOGY | Admitting: UROLOGY
Payer: COMMERCIAL

## 2024-08-15 VITALS
RESPIRATION RATE: 18 BRPM | TEMPERATURE: 97 F | DIASTOLIC BLOOD PRESSURE: 70 MMHG | HEART RATE: 60 BPM | SYSTOLIC BLOOD PRESSURE: 127 MMHG | OXYGEN SATURATION: 98 %

## 2024-08-15 DIAGNOSIS — C61 PROSTATE CANCER (H): Primary | ICD-10-CM

## 2024-08-15 PROCEDURE — 250N000025 HC SEVOFLURANE, PER MIN: Performed by: UROLOGY

## 2024-08-15 PROCEDURE — 999N000141 HC STATISTIC PRE-PROCEDURE NURSING ASSESSMENT: Performed by: UROLOGY

## 2024-08-15 PROCEDURE — 272N000001 HC OR GENERAL SUPPLY STERILE: Performed by: UROLOGY

## 2024-08-15 PROCEDURE — 360N000075 HC SURGERY LEVEL 2, PER MIN: Performed by: UROLOGY

## 2024-08-15 PROCEDURE — 250N000011 HC RX IP 250 OP 636: Performed by: NURSE PRACTITIONER

## 2024-08-15 PROCEDURE — 258N000003 HC RX IP 258 OP 636: Performed by: NURSE ANESTHETIST, CERTIFIED REGISTERED

## 2024-08-15 PROCEDURE — 710N000009 HC RECOVERY PHASE 1, LEVEL 1, PER MIN: Performed by: UROLOGY

## 2024-08-15 PROCEDURE — 710N000012 HC RECOVERY PHASE 2, PER MINUTE: Performed by: UROLOGY

## 2024-08-15 PROCEDURE — 250N000011 HC RX IP 250 OP 636: Performed by: NURSE ANESTHETIST, CERTIFIED REGISTERED

## 2024-08-15 PROCEDURE — 52204 CYSTOSCOPY W/BIOPSY(S): CPT | Performed by: NURSE ANESTHETIST, CERTIFIED REGISTERED

## 2024-08-15 PROCEDURE — 52204 CYSTOSCOPY W/BIOPSY(S): CPT | Performed by: ANESTHESIOLOGY

## 2024-08-15 PROCEDURE — 258N000003 HC RX IP 258 OP 636: Performed by: ANESTHESIOLOGY

## 2024-08-15 PROCEDURE — 370N000017 HC ANESTHESIA TECHNICAL FEE, PER MIN: Performed by: UROLOGY

## 2024-08-15 PROCEDURE — 250N000009 HC RX 250: Performed by: NURSE ANESTHETIST, CERTIFIED REGISTERED

## 2024-08-15 PROCEDURE — 88305 TISSUE EXAM BY PATHOLOGIST: CPT | Mod: TC | Performed by: UROLOGY

## 2024-08-15 PROCEDURE — 88305 TISSUE EXAM BY PATHOLOGIST: CPT | Mod: 26 | Performed by: PATHOLOGY

## 2024-08-15 PROCEDURE — 250N000009 HC RX 250: Performed by: UROLOGY

## 2024-08-15 RX ORDER — LIDOCAINE HYDROCHLORIDE 10 MG/ML
INJECTION, SOLUTION INFILTRATION; PERINEURAL PRN
Status: DISCONTINUED | OUTPATIENT
Start: 2024-08-15 | End: 2024-08-15

## 2024-08-15 RX ORDER — DEXAMETHASONE SODIUM PHOSPHATE 4 MG/ML
INJECTION, SOLUTION INTRA-ARTICULAR; INTRALESIONAL; INTRAMUSCULAR; INTRAVENOUS; SOFT TISSUE PRN
Status: DISCONTINUED | OUTPATIENT
Start: 2024-08-15 | End: 2024-08-15

## 2024-08-15 RX ORDER — CEFAZOLIN SODIUM/WATER 2 G/20 ML
2 SYRINGE (ML) INTRAVENOUS SEE ADMIN INSTRUCTIONS
Status: DISCONTINUED | OUTPATIENT
Start: 2024-08-15 | End: 2024-08-15 | Stop reason: HOSPADM

## 2024-08-15 RX ORDER — FENTANYL CITRATE 50 UG/ML
50 INJECTION, SOLUTION INTRAMUSCULAR; INTRAVENOUS EVERY 5 MIN PRN
Status: DISCONTINUED | OUTPATIENT
Start: 2024-08-15 | End: 2024-08-15 | Stop reason: HOSPADM

## 2024-08-15 RX ORDER — CEPHALEXIN 500 MG/1
500 CAPSULE ORAL 3 TIMES DAILY
Qty: 9 CAPSULE | Refills: 0 | Status: SHIPPED | OUTPATIENT
Start: 2024-08-15

## 2024-08-15 RX ORDER — ONDANSETRON 2 MG/ML
4 INJECTION INTRAMUSCULAR; INTRAVENOUS EVERY 30 MIN PRN
Status: DISCONTINUED | OUTPATIENT
Start: 2024-08-15 | End: 2024-08-15 | Stop reason: HOSPADM

## 2024-08-15 RX ORDER — HYDROMORPHONE HCL IN WATER/PF 6 MG/30 ML
0.2 PATIENT CONTROLLED ANALGESIA SYRINGE INTRAVENOUS EVERY 5 MIN PRN
Status: DISCONTINUED | OUTPATIENT
Start: 2024-08-15 | End: 2024-08-15 | Stop reason: HOSPADM

## 2024-08-15 RX ORDER — ONDANSETRON 2 MG/ML
4 INJECTION INTRAMUSCULAR; INTRAVENOUS EVERY 30 MIN PRN
Status: DISCONTINUED | OUTPATIENT
Start: 2024-08-15 | End: 2024-08-15 | Stop reason: SINTOL

## 2024-08-15 RX ORDER — OXYCODONE HYDROCHLORIDE 5 MG/1
10 TABLET ORAL
Status: DISCONTINUED | OUTPATIENT
Start: 2024-08-15 | End: 2024-08-15 | Stop reason: HOSPADM

## 2024-08-15 RX ORDER — HYDROMORPHONE HCL IN WATER/PF 6 MG/30 ML
0.4 PATIENT CONTROLLED ANALGESIA SYRINGE INTRAVENOUS EVERY 5 MIN PRN
Status: DISCONTINUED | OUTPATIENT
Start: 2024-08-15 | End: 2024-08-15 | Stop reason: HOSPADM

## 2024-08-15 RX ORDER — CEFAZOLIN SODIUM/WATER 2 G/20 ML
2 SYRINGE (ML) INTRAVENOUS
Status: DISCONTINUED | OUTPATIENT
Start: 2024-08-15 | End: 2024-08-15 | Stop reason: HOSPADM

## 2024-08-15 RX ORDER — FENTANYL CITRATE 50 UG/ML
INJECTION, SOLUTION INTRAMUSCULAR; INTRAVENOUS PRN
Status: DISCONTINUED | OUTPATIENT
Start: 2024-08-15 | End: 2024-08-15

## 2024-08-15 RX ORDER — SODIUM CHLORIDE, SODIUM LACTATE, POTASSIUM CHLORIDE, CALCIUM CHLORIDE 600; 310; 30; 20 MG/100ML; MG/100ML; MG/100ML; MG/100ML
INJECTION, SOLUTION INTRAVENOUS CONTINUOUS
Status: DISCONTINUED | OUTPATIENT
Start: 2024-08-15 | End: 2024-08-15 | Stop reason: HOSPADM

## 2024-08-15 RX ORDER — LIDOCAINE 40 MG/G
CREAM TOPICAL
Status: DISCONTINUED | OUTPATIENT
Start: 2024-08-15 | End: 2024-08-15 | Stop reason: HOSPADM

## 2024-08-15 RX ORDER — OXYCODONE HYDROCHLORIDE 5 MG/1
5 TABLET ORAL
Status: DISCONTINUED | OUTPATIENT
Start: 2024-08-15 | End: 2024-08-15 | Stop reason: HOSPADM

## 2024-08-15 RX ORDER — SODIUM CHLORIDE, SODIUM LACTATE, POTASSIUM CHLORIDE, CALCIUM CHLORIDE 600; 310; 30; 20 MG/100ML; MG/100ML; MG/100ML; MG/100ML
INJECTION, SOLUTION INTRAVENOUS CONTINUOUS PRN
Status: DISCONTINUED | OUTPATIENT
Start: 2024-08-15 | End: 2024-08-15

## 2024-08-15 RX ORDER — NALOXONE HYDROCHLORIDE 0.4 MG/ML
0.1 INJECTION, SOLUTION INTRAMUSCULAR; INTRAVENOUS; SUBCUTANEOUS
Status: DISCONTINUED | OUTPATIENT
Start: 2024-08-15 | End: 2024-08-15 | Stop reason: HOSPADM

## 2024-08-15 RX ORDER — DEXAMETHASONE SODIUM PHOSPHATE 4 MG/ML
4 INJECTION, SOLUTION INTRA-ARTICULAR; INTRALESIONAL; INTRAMUSCULAR; INTRAVENOUS; SOFT TISSUE
Status: DISCONTINUED | OUTPATIENT
Start: 2024-08-15 | End: 2024-08-15 | Stop reason: HOSPADM

## 2024-08-15 RX ORDER — DEXAMETHASONE SODIUM PHOSPHATE 10 MG/ML
4 INJECTION, SOLUTION INTRAMUSCULAR; INTRAVENOUS
Status: DISCONTINUED | OUTPATIENT
Start: 2024-08-15 | End: 2024-08-15 | Stop reason: HOSPADM

## 2024-08-15 RX ORDER — PROPOFOL 10 MG/ML
INJECTION, EMULSION INTRAVENOUS PRN
Status: DISCONTINUED | OUTPATIENT
Start: 2024-08-15 | End: 2024-08-15

## 2024-08-15 RX ORDER — ONDANSETRON 2 MG/ML
INJECTION INTRAMUSCULAR; INTRAVENOUS PRN
Status: DISCONTINUED | OUTPATIENT
Start: 2024-08-15 | End: 2024-08-15

## 2024-08-15 RX ORDER — FENTANYL CITRATE 50 UG/ML
25 INJECTION, SOLUTION INTRAMUSCULAR; INTRAVENOUS EVERY 5 MIN PRN
Status: DISCONTINUED | OUTPATIENT
Start: 2024-08-15 | End: 2024-08-15 | Stop reason: HOSPADM

## 2024-08-15 RX ORDER — ONDANSETRON 4 MG/1
4 TABLET, ORALLY DISINTEGRATING ORAL EVERY 30 MIN PRN
Status: DISCONTINUED | OUTPATIENT
Start: 2024-08-15 | End: 2024-08-15 | Stop reason: SINTOL

## 2024-08-15 RX ORDER — ONDANSETRON 4 MG/1
4 TABLET, ORALLY DISINTEGRATING ORAL EVERY 30 MIN PRN
Status: DISCONTINUED | OUTPATIENT
Start: 2024-08-15 | End: 2024-08-15 | Stop reason: HOSPADM

## 2024-08-15 RX ADMIN — ONDANSETRON 4 MG: 2 INJECTION INTRAMUSCULAR; INTRAVENOUS at 12:10

## 2024-08-15 RX ADMIN — MIDAZOLAM 2 MG: 1 INJECTION INTRAMUSCULAR; INTRAVENOUS at 11:51

## 2024-08-15 RX ADMIN — PROPOFOL 200 MG: 10 INJECTION, EMULSION INTRAVENOUS at 12:04

## 2024-08-15 RX ADMIN — SODIUM CHLORIDE, POTASSIUM CHLORIDE, SODIUM LACTATE AND CALCIUM CHLORIDE: 600; 310; 30; 20 INJECTION, SOLUTION INTRAVENOUS at 11:38

## 2024-08-15 RX ADMIN — SODIUM CHLORIDE, POTASSIUM CHLORIDE, SODIUM LACTATE AND CALCIUM CHLORIDE: 600; 310; 30; 20 INJECTION, SOLUTION INTRAVENOUS at 11:24

## 2024-08-15 RX ADMIN — PHENYLEPHRINE HYDROCHLORIDE 150 MCG: 10 INJECTION INTRAVENOUS at 12:16

## 2024-08-15 RX ADMIN — FENTANYL CITRATE 100 MCG: 50 INJECTION INTRAMUSCULAR; INTRAVENOUS at 12:05

## 2024-08-15 RX ADMIN — Medication 2 G: at 11:56

## 2024-08-15 RX ADMIN — DEXAMETHASONE SODIUM PHOSPHATE 10 MG: 4 INJECTION, SOLUTION INTRA-ARTICULAR; INTRALESIONAL; INTRAMUSCULAR; INTRAVENOUS; SOFT TISSUE at 12:09

## 2024-08-15 RX ADMIN — LIDOCAINE HYDROCHLORIDE 50 MG: 10 INJECTION, SOLUTION INFILTRATION; PERINEURAL at 12:03

## 2024-08-15 ASSESSMENT — ACTIVITIES OF DAILY LIVING (ADL)
ADLS_ACUITY_SCORE: 29
ADLS_ACUITY_SCORE: 30
ADLS_ACUITY_SCORE: 27
ADLS_ACUITY_SCORE: 29
ADLS_ACUITY_SCORE: 30

## 2024-08-15 NOTE — ANESTHESIA POSTPROCEDURE EVALUATION
Patient: Juvenal Blake    Procedure: Procedure(s):  CYSTOSCOPY WITH BLADDER BIOPSY AND BRASHER PLACEMENT  AND FULGURATION       Anesthesia Type:  General    Note:  Disposition: Outpatient   Postop Pain Control: Uneventful            Sign Out: Well controlled pain   PONV: No   Neuro/Psych: Uneventful            Sign Out: Acceptable/Baseline neuro status   Airway/Respiratory: Uneventful            Sign Out: Acceptable/Baseline resp. status   CV/Hemodynamics: Uneventful            Sign Out: Acceptable CV status; No obvious hypovolemia; No obvious fluid overload   Other NRE:    DID A NON-ROUTINE EVENT OCCUR?            Last vitals:  Vitals Value Taken Time   /70 08/15/24 1330   Temp 36.5  C (97.7  F) 08/15/24 1335   Pulse 63 08/15/24 1335   Resp 23 08/15/24 1335   SpO2 99 % 08/15/24 1335   Vitals shown include unfiled device data.    Electronically Signed By: Facundo Singh MD  August 15, 2024  1:37 PM

## 2024-08-15 NOTE — INTERVAL H&P NOTE
I have reviewed the surgical (or preoperative) H&P that is linked to this encounter, and examined the patient. There are no significant changes    Shai Issa MD      Clinical Conditions Present on Arrival:  Clinically Significant Risk Factors Present on Admission

## 2024-08-15 NOTE — ANESTHESIA PREPROCEDURE EVALUATION
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)     Low serum IgG for age     Non Hodgkin's lymphoma (H)     Non Hodgkin's lymphoma (H)     Osteopenia     Prostate CA (H)     Shortness of breath     Sleep apnea     CPAP    Thrombocytopenia (H24)       Past Surgical History:   Procedure Laterality Date    ABDOMEN SURGERY      for pyloric stenosis as an infant    COLONOSCOPY      DISTAL CLAVICLE EXCISION      per H & P     HERNIA REPAIR      inguinal    HERNIA REPAIR, UMBILICAL      INSERT PICC LINE N/A 2021    Procedure: INSERTION, PICC EXCHANGE, PREVIOUS PICC IS OUT 10 CENTIMETERS;  Surgeon: Christiano Murdock MD;  [place] [facility]    IR CHEST PORT PLACEMENT > 5 YRS OF AGE  2017    IR LYMPH NODE BIOPSY  10/01/2020    IR LYMPH NODE BIOPSY  2021    IR LYMPH NODE BIOPSY  2021    IR PICC PLACEMENT > 5 YRS OF AGE  2021    LAPAROSCOPIC HERNIORRHAPHY INCISIONAL Right 2020    Procedure: HERNIORRHAPHY, UMBILICAL, LAPAROSCOPIC; AXILLARY LYMPH NODE BIOPSY;  Surgeon: Rob Farrell MD;  Location: [facility];  Service: General    ORTHOPEDIC SURGERY      OTHER SURGICAL HISTORY Left     shoulder arthroscopy    OTHER SURGICAL HISTORY  2017    port a cath placement    PICC DOUBLE LUMEN PLACEMENT Right 2021    47cm (3cm external), Basilic vein    PICC DOUBLE LUMEN PLACEMENT Left 2022    46 cm 5 fr dl Bard PICC    GA LAP,PROSTATECTOMY,RADICAL,W/NERVE SPARE,INCL ROBOTIC N/A 11/15/2017    Procedure: ROBOTIC ASSISTED RADICAL  "RETROPUBIC PROSTATECTOMY BILATERAL PELVIC LYMPH NODE DISSECTION ;  Surgeon: Ezio Steele MD;  Location: [facility] OR;  Service: Urology    TONSILLECTOMY      US LYMPH NODE BIOPSY  2019      Allergies   Allergen Reactions    Rituxan [Rituximab] Shortness Of Breath     Wife states \"seizure-like symptoms\"    Levaquin [Levofloxacin]      Significant tendon pain    Ondansetron Itching      Social History     Tobacco Use    Smoking status: Former     Current packs/day: 0.00     Types: Cigarettes     Quit date: 1995     Years since quittin.1     Passive exposure: Past    Smokeless tobacco: Never   Substance Use Topics    Alcohol use: Yes     Comment: 1-2 a day,occ more      Wt Readings from Last 1 Encounters:   24 100.5 kg (221 lb 9.6 oz)        Anesthesia Evaluation            ROS/MED HX  ENT/Pulmonary:  - neg pulmonary ROS   (+) sleep apnea,                                       Neurologic:  - neg neurologic ROS     Cardiovascular:  - neg cardiovascular ROS   (+)  - -   -  - -           SAMS.                           METS/Exercise Tolerance:     Hematologic:       Musculoskeletal:       GI/Hepatic:  - neg GI/hepatic ROS   (+) GERD,                   Renal/Genitourinary:  - neg Renal ROS     Endo:  - neg endo ROS   (+)               Obesity,       Psychiatric/Substance Use:       Infectious Disease:  - neg infectious disease ROS     Malignancy:  - neg malignancy ROS     Other:  - neg other ROS          Physical Exam    Airway        Mallampati: II    Neck ROM: full     Respiratory Devices and Support         Dental           Cardiovascular   cardiovascular exam normal          Pulmonary   pulmonary exam normal                OUTSIDE LABS:  CBC:   Lab Results   Component Value Date    WBC 4.5 2024    WBC 4.0 2024    HGB 12.0 (L) 2024    HGB 12.5 (L) 2024    HCT 34.3 (L) 2024    HCT 36.1 (L) 2024     (L) 2024    PLT 94 (L) 2024 " "    BMP:   Lab Results   Component Value Date     08/01/2024     07/05/2024    POTASSIUM 4.2 08/01/2024    POTASSIUM 4.6 07/05/2024    CHLORIDE 103 08/01/2024    CHLORIDE 103 07/05/2024    CO2 25 08/01/2024    CO2 26 07/05/2024    BUN 14.1 08/01/2024    BUN 16.3 07/05/2024    CR 1.11 08/01/2024    CR 1.05 07/05/2024     (H) 08/01/2024     (H) 07/05/2024     COAGS:   Lab Results   Component Value Date    PTT 29 02/17/2022    INR 0.96 02/17/2022    FIBR 308 02/17/2022     POC: No results found for: \"BGM\", \"HCG\", \"HCGS\"  HEPATIC:   Lab Results   Component Value Date    ALBUMIN 4.5 07/05/2024    PROTTOTAL 6.6 07/05/2024    ALT 24 07/05/2024    AST 26 07/05/2024    ALKPHOS 85 07/05/2024    BILITOTAL 0.6 07/05/2024     OTHER:   Lab Results   Component Value Date    LACT 3.8 (H) 06/24/2021    TRE 9.1 08/01/2024    PHOS 3.4 08/02/2023    MAG 2.0 08/02/2023    CRP <2.9 02/17/2022       Anesthesia Plan    ASA Status:  2    NPO Status:  NPO Appropriate    Anesthesia Type: General.     - Airway: LMA   Induction: Intravenous.           Consents    Anesthesia Plan(s) and associated risks, benefits, and realistic alternatives discussed. Questions answered and patient/representative(s) expressed understanding.     - Discussed:     - Discussed with:  Patient            Postoperative Care       PONV prophylaxis: Ondansetron (or other 5HT-3), Dexamethasone or Solumedrol     Comments:               Facundo Singh MD    I have reviewed the pertinent notes and labs in the chart from the past 30 days and (re)examined the patient.  Any updates or changes from those notes are reflected in this note.                  "

## 2024-08-15 NOTE — OP NOTE
Date of surgery: 8/15/2024  Location:[facility]      Surgeon: Shai Issa MD    Anesthesia: General    Preoperative diagnosis: History of prostate cancer status postradiation.  Hematuria and radiation cystitis changes noted within the bladder biopsy to rule out malignancy.    Postoperative diagnosis: Same    Procedure: Cystoscopy with bladder biopsy and fulguration and Hahn catheter placement    Operative indication: Juvenal Blake is a 58 year old male history of prostate cancer status post prostatectomy and radiation.  Recurrent hematuria episodes and cystoscopy showing suspicious lesions within the bladder biopsy.    Operative findings: Cystitis changes within the bladder.  No significant erythematous change to suggest CIS.  Neovascularity noted consistent with history of radiation    Operative procedure: The patient was brought to the operating room..  General anesthesia.  Sterile conditions.    Cystoscopy was performed.  The urethra is normal up to level the bladder neck where there is a scar tissue around the bladder neck which does except the 22 Iranian cystoscope with some resistance.    Within the bladder ureteral orifice ease are normal in position.  Along the right ureteral orifice there is some edematous change without papillary tumor formation or significant erythema.  The left orifice is normal in character.  Along the posterior bladder left and right there is neovascularity consistent with radiation cystitis without active bleeding at this time.  Biopsies are taken of the most suspicious areas of the neovascularity in the posterior of the left and the right biopsy sent as 3 different biopsy locations.  The biopsy sites are fulgurated and bleeding is controlled.    18 Iranian Hahn catheter is left in place and he is awoken from anesthesia and transferred recovery stable    Drains: 8 Iranian Hahn    Specimens: Posterior, left, right bladder biopsy    Estimated blood loss: 5  cc    Complications: none      Shai Issa MD

## 2024-08-15 NOTE — ANESTHESIA CARE TRANSFER NOTE
Patient: Juvenal Blake    Procedure: Procedure(s):  CYSTOSCOPY WITH BLADDER BIOPSY AND BRASHER PLACEMENT  AND FULGURATION       Diagnosis: Lesion of bladder [N32.9]  Diagnosis Additional Information: No value filed.    Anesthesia Type:   General     Note:    Oropharynx: oropharynx clear of all foreign objects and spontaneously breathing  Level of Consciousness: drowsy  Oxygen Supplementation: face mask  Level of Supplemental Oxygen (L/min / FiO2): 8  Independent Airway: airway patency satisfactory and stable  Dentition: dentition unchanged  Vital Signs Stable: post-procedure vital signs reviewed and stable  Report to RN Given: handoff report given  Patient transferred to: PACU    Handoff Report: Identifed the Patient, Identified the Reponsible Provider, Reviewed the pertinent medical history, Discussed the surgical course, Reviewed Intra-OP anesthesia mangement and issues during anesthesia, Set expectations for post-procedure period and Allowed opportunity for questions and acknowledgement of understanding      Vitals:  Vitals Value Taken Time   BP     Temp     Pulse     Resp     SpO2         Electronically Signed By: ALESSANDRO Garibay CRNA  August 15, 2024  12:48 PM

## 2024-08-15 NOTE — ANESTHESIA PROCEDURE NOTES
Airway       Patient location during procedure: OR  Staff -        CRNA: Italo Torres APRN CRNA       Performed By: CRNA  Consent for Airway        Urgency: elective  Indications and Patient Condition       Indications for airway management: irene-procedural       Induction type:intravenous       Mask difficulty assessment: 1 - vent by mask    Final Airway Details       Final airway type: supraglottic airway    Supraglottic Airway Details        Type: LMA       Brand: Ambu AuraGain       LMA size: 5    Post intubation assessment        Placement verified by: capnometry, equal breath sounds and chest rise        Number of attempts at approach: 1       Number of other approaches attempted: 0       Secured with: tape       Ease of procedure: easy       Dentition: Intact and Unchanged

## 2024-08-16 ENCOUNTER — TELEPHONE (OUTPATIENT)
Dept: ONCOLOGY | Facility: HOSPITAL | Age: 59
End: 2024-08-16
Payer: COMMERCIAL

## 2024-08-16 ENCOUNTER — LAB (OUTPATIENT)
Dept: INFUSION THERAPY | Facility: HOSPITAL | Age: 59
End: 2024-08-16
Payer: COMMERCIAL

## 2024-08-16 DIAGNOSIS — D80.1 HYPOGAMMAGLOBULINEMIA (H): Primary | ICD-10-CM

## 2024-08-16 LAB
PATH REPORT.COMMENTS IMP SPEC: NORMAL
PATH REPORT.COMMENTS IMP SPEC: NORMAL
PATH REPORT.FINAL DX SPEC: NORMAL
PATH REPORT.GROSS SPEC: NORMAL
PATH REPORT.MICROSCOPIC SPEC OTHER STN: NORMAL
PATH REPORT.RELEVANT HX SPEC: NORMAL
PHOTO IMAGE: NORMAL

## 2024-08-16 PROCEDURE — 250N000011 HC RX IP 250 OP 636

## 2024-08-16 PROCEDURE — 36591 DRAW BLOOD OFF VENOUS DEVICE: CPT

## 2024-08-16 PROCEDURE — 82784 ASSAY IGA/IGD/IGG/IGM EACH: CPT

## 2024-08-16 RX ORDER — HEPARIN SODIUM (PORCINE) LOCK FLUSH IV SOLN 100 UNIT/ML 100 UNIT/ML
5 SOLUTION INTRAVENOUS
Status: DISCONTINUED | OUTPATIENT
Start: 2024-08-16 | End: 2024-08-16 | Stop reason: HOSPADM

## 2024-08-16 RX ADMIN — Medication 5 ML: at 12:22

## 2024-08-16 NOTE — TELEPHONE ENCOUNTER
I received a phone call today from Juvenal.  He is calling to schedule a lab only appointment to have his IgG checked.  I was able to add him to the lab schedule today.  Will send a message to ANNIE Elliott as an FYI and to watch for results.    Ileana Lemons RN on 8/16/2024 at 11:30 AM

## 2024-08-19 LAB — IGG SERPL-MCNC: 429 MG/DL (ref 610–1616)

## 2024-09-02 ENCOUNTER — MYC REFILL (OUTPATIENT)
Dept: TRANSPLANT | Facility: CLINIC | Age: 59
End: 2024-09-02
Payer: COMMERCIAL

## 2024-09-02 DIAGNOSIS — C82.08 FOLLICULAR LYMPHOMA GRADE I, LYMPH NODES OF MULTIPLE SITES (H): ICD-10-CM

## 2024-09-03 DIAGNOSIS — C82.08 FOLLICULAR LYMPHOMA GRADE I, LYMPH NODES OF MULTIPLE SITES (H): ICD-10-CM

## 2024-09-03 RX ORDER — ACYCLOVIR 800 MG/1
800 TABLET ORAL EVERY 12 HOURS
Qty: 180 TABLET | Refills: 1 | OUTPATIENT
Start: 2024-09-03

## 2024-09-03 RX ORDER — ACYCLOVIR 800 MG/1
400 TABLET ORAL EVERY 12 HOURS
Qty: 180 TABLET | Refills: 1 | Status: SHIPPED | OUTPATIENT
Start: 2024-09-03

## 2024-09-03 NOTE — TELEPHONE ENCOUNTER
Yairt message received from Juvenal requesting a refill of Acyclovir. He is currently taking 400mg two times per day. Last refilled: 8/2/23, #180, 1 refill. Will route this to Dr. Crooks to review and refill.     Ileana Lemons RN on 9/3/2024 at 12:37 PM

## 2024-09-05 ENCOUNTER — ENROLLMENT (OUTPATIENT)
Dept: HOME HEALTH SERVICES | Facility: HOME HEALTH | Age: 59
End: 2024-09-05
Payer: COMMERCIAL

## 2024-09-11 NOTE — PROGRESS NOTES
[facility] GENETIC COUNSELING: CONSULT SUMMARY  Patient: Juvenal Blake (1965 / 52 y.o., male) MRN: [id number]   [address] [place] [address]      Date/Location of Visit: 4/17/2018, 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  He completed chemotherapy and maintenance therapy but had relapse on imaging in 2016.  He had a second course of chemotherapy and continues on maintenance treatment now.  PET/CT on 8/10/2017 demonstrated a complete response of the lymphoma but persistent uptake in the prostate.  Juvenal was subsequently diagnosed with prostate cancer at age 52 and had da Meli prostatectomy on 11/15/2017 with pre-op PSA of 27.39.  Pathology confirmed adenocarcinoma Herod 4+3 = 7 with tertiary Maricel pattern 5, extraprostatic tumor extension, bilateral seminal vesicle invasion, and positive margins (i.e., high risk disease).  All 3 lymph nodes were negative.  Juvenal is now seeing Dr. Mata for postop radiation for his prostate cancer.  CT of the chest, abdomen, pelvis on 1/22/2018 demonstrated some mildly increased right inguinal adenopathy which will continued to be monitored as well.        Other cancer screening: Juvenal tells me he has had approximately 3 colonoscopies in the past and maybe 1 polyp removed each time.    Exposures: Quit smoking at least 20 years ago.    Prior genetic testing: None.    No prior transplant or recent transfusion.    FAMILY: Three-generation pedigree was taken to assess cancer risk using information provided by  Juvenal and is scanned into his chart.        Significant for: Mother diagnosed with colon cancer in her late 40s and  from surgical complications at age 51.  A maternal uncle and maternal grandmother had lung cancers.  Juvenal's father had a non-Hodgkin's lymphoma but he tells me this was not follicular like his.  No other known cancers in the family.    Genetic testing: None.    The importance of accurate and verified history was emphasized.  In evaluating Juvenal's personal and family history we discussed differences between sporadic, environmental, and genetic contributions to cancer risk including relevant genetic principals and inheritance patterns.  I emphasized that the factors underlying cancer diagnosis are often multiple and complex.      ASSESSMENT:  Juvenal meets NCCN guidelines for hereditary cancer testing both based on his mother's early-onset colon cancer and his own high risk prostate cancer.  Overall, about 6% of men with localized, high-risk prostate cancer have an underlying gene mutation, these being primarily in DNA repair genes like BRCA1/2 and ARIEL (TCGA).  In regards to his mother, recent studies suggest a gene mutation is identified in about 15-20% of individuals with colon cancer diagnosed prior to age 50, about 8-13% being Goodrich syndrome (Fahad et al. 2017, Pippa et al. 2017).    Testing for Juvenal is offered in the context of a multi-gene panel to include analysis of genes implicated in both prostate cancer and/or colon cancer.  However, we discussed that accurate testing for him would require a tissue sample (i.e., skin biopsy) since his lymphoma even in remission could result in a false positive or negative in blood or saliva.  I could help to arrange this through the outpatient surgery center if he is interested.      I also note the occurrence of lymphoma in both Juvenal and his father. There are some rare genetic syndromes associated with elevated risk of lymphoma but typically with  distinct presenting manifestations (often in childhood) of immunodeficiency, lymphoproliferation, or genomic instability.  Without specific suspicion for one or more of these conditions, genetic testing for familial lymphoma is limited and low yield at this time.  Juvenal denies personal/family history of recurrent/severe infections, abnormal blood counts or anemia, differences in skin pigmentation, learning disabilities, birth defects, or consanguinity.      USE OF RESULTS:  We reviewed likelihood of possible results of genetic testing: positive, negative, and a variant of uncertain significance along with medical and social/psychological considerations. We reviewed cancer risks associated with hereditary predisposition and options for prevention and/or heightened surveillance if positive.      In some cases results could factor into discussion of Juvenal's prostate cancer prognosis and in the event of advanced or metastatic disease, could play a role in recommended treatment (i.e., platinum chemotherapy or PARP inhibitors).  Testing could also uncover additional cancer risks for Juvenal and his family. For example, men with mutations in BRCA1/2 also have small but elevated risk for breast cancer, melanoma, and pancreatic cancer which warrants regular physical exam of the chest, annual dermatology and eye exams, and in some families, endoscopic ultrasound and MRI of the pancreas.  Goodrich syndrome would be associated with significantly increased risk for colon cancer warranting colonoscopy at least every 1-2 years.    With panel testing results may identify risks across a spectrum of cancer types and of varying magnitude including those that would be unexpected in the context of Juvenal's personal/family history.  Use of results may be limited in the case of ambiguous findings, findings in poorly characterized genes, or involving cancers for which prevention or surveillance is not possible.  Risks and recommendations  associated with both new and established genes may evolve over time.  Limitations of a negative result were emphasized including possibility of risk being linked to one or more other genes or mutations not detected by current methodologies.      FAMILY:  Reviewed autosomal dominant inheritance.  If a mutation is identified targeted testing will be available to relatives and the importance of Juvenal sharing this with them was emphasized.  Testing is generally limited to adults although exceptions apply to genes associated with childhood cancer risks or rare childhood conditions.    Even if Juvenal's results are negative his siblings and maternal relatives would be candidates for their own testing related to his mother's colon cancer.      For male relatives including Juvenal's brother and sons, PSA surveillance should start by at least age 45 regardless of results.    PSYCHOSOCIAL/DECISION-MAKING: Juvenal expressed no hesitations or concerns as it pertains to genetic testing or his ability to cope with results.    OTHER: Collection or assessment of other aspects of the family history not related to cancer risk were beyond the scope of our discussion today.    PLAN: Juvenal expressed interest in hereditary cancer testing; however, he opts not to initiate this today.  He would prefer to wait until his radiation treatments are over and he has settled into his new job.  Juvenal agreed to contact me if/when he feels ready to proceed and I can help arrange for a skin biopsy.  Verbal and written informed consent obtained today.  Tentative testing plan:    [facility] 39-gene custom common cancers panel (BRCA1, BRCA2, PTEN, TP53, CDH1, CTNNA1, MLH1, MSH2, MSH3, MSH6, PMS2, EPCAM, STK11, NF1, DICER1, SMARCA4, MUTYH, APC, POLD1, POLE, BMPR1A, SMAD4, GREM1, AXIN2, NTHL1, ARIEL, BRIP1, CHEK2, NBN, PALB2, RAD51C, RAD51D, BAP1, MITF, POT1, CDKN2A, CDK4, HOXB13, FANCA)    It was a pleasure to meet Juvenal today and to participate in his care.  I am  [Mother] : mother of course happy to address any follow-up questions/concerns should these arise for Juvenal or his providers. Juvenal should contact our office at least annually for updates or with changes to personal/family history as the clinic does not routinely re-contact individual patients with an increase or change in knowledge.            LUCAS UPTON MS, [facility]  Licensed Genetic Counselor  [facility]    Handouts or Enclosures:  contact information    Face-to-face time: 30 minutes    cc:   MD Todd Mccormick MD ([facility])   Cole Sandoval MD ([facility])         [Vitamin] : Primary Fluoride Source: Vitamin [NO] : No [FreeTextEntry7] : 12 mth [facility] [FreeTextEntry1] : STORM  is here for12  month  well child visit Safety: Water heater temperature set at <120 degrees F. Carbon monoxide detectors at home. Smoke detectors at home. No gun in home. Nutrition good eater, less for dinner will eat for example egg, bf food wakes x1 at night for feeding will transition to whole milk  will try gradual transition, l less than 24o sippy straw cup to start has not tried peanut butter Elimination: Normal urination and bowel movements Sleep: No concerns Immunizations: Up to date. Environmental   safety discussed uses bottled water discussed re possible Fluoride in tap water in [employer] Patient is doing well at home.  takes 5 steps saying several words specifi mama dad Parent(s) have current concerns or issues. doing well mom would like vaccines dad does not want vaccines concern per dad  of autism discussed our practice follow cdc/aap  guidelines will do Prevnar, mom calling dad about mmr during appt,  refusal form done for MMR hepatitis  a discussed not mandatory

## 2024-09-16 ENCOUNTER — LAB (OUTPATIENT)
Dept: INFUSION THERAPY | Facility: HOSPITAL | Age: 59
End: 2024-09-16
Payer: COMMERCIAL

## 2024-09-16 DIAGNOSIS — D80.1 HYPOGAMMAGLOBULINEMIA (H): ICD-10-CM

## 2024-09-16 DIAGNOSIS — C82.08 FOLLICULAR LYMPHOMA GRADE I, LYMPH NODES OF MULTIPLE SITES (H): ICD-10-CM

## 2024-09-16 DIAGNOSIS — C83.398 DIFFUSE LARGE B-CELL LYMPHOMA OF EXTRANODAL SITE EXCLUDING SPLEEN AND OTHER SOLID ORGANS: Primary | ICD-10-CM

## 2024-09-16 PROCEDURE — 36591 DRAW BLOOD OFF VENOUS DEVICE: CPT

## 2024-09-16 PROCEDURE — 82784 ASSAY IGA/IGD/IGG/IGM EACH: CPT

## 2024-09-16 PROCEDURE — 250N000011 HC RX IP 250 OP 636: Performed by: INTERNAL MEDICINE

## 2024-09-16 RX ORDER — HEPARIN SODIUM (PORCINE) LOCK FLUSH IV SOLN 100 UNIT/ML 100 UNIT/ML
5 SOLUTION INTRAVENOUS
Status: DISCONTINUED | OUTPATIENT
Start: 2024-09-16 | End: 2024-09-16 | Stop reason: HOSPADM

## 2024-09-16 RX ORDER — HEPARIN SODIUM (PORCINE) LOCK FLUSH IV SOLN 100 UNIT/ML 100 UNIT/ML
5 SOLUTION INTRAVENOUS
OUTPATIENT
Start: 2024-09-16

## 2024-09-16 RX ADMIN — Medication 5 ML: at 13:44

## 2024-09-17 LAB — IGG SERPL-MCNC: 342 MG/DL (ref 610–1616)

## 2024-09-17 NOTE — PROGRESS NOTES
01/01/2024: ([employer]IVIGEN) MUST BE ON CADD FOR INSURANCE COVERAGE. IF NOT, RETURN TO INTAKE FOR SELF PAY QUOTE. LINE CARE COVERAGE IF ON CADD. OTHER THERAPY: RETURN TO INTAKE FOR COVERAGE DETERMINATION.      [facility] CANNOT DO NURSING IF PT IS HB, IF NOT CAN BE IVN IF PT AGREES TO SELF-PAY.

## 2024-10-03 ENCOUNTER — HOSPITAL ENCOUNTER (EMERGENCY)
Facility: HOSPITAL | Age: 59
Discharge: HOME | End: 2024-10-03
Payer: COMMERCIAL

## 2024-10-03 VITALS
RESPIRATION RATE: 18 BRPM | WEIGHT: 230 LBS | BODY MASS INDEX: 36.1 KG/M2 | HEART RATE: 69 BPM | DIASTOLIC BLOOD PRESSURE: 68 MMHG | HEIGHT: 67 IN | OXYGEN SATURATION: 99 % | TEMPERATURE: 97.7 F | SYSTOLIC BLOOD PRESSURE: 131 MMHG

## 2024-10-03 DIAGNOSIS — K40.90 LEFT INGUINAL HERNIA: ICD-10-CM

## 2024-10-03 DIAGNOSIS — R10.9 ABDOMINAL PAIN, UNSPECIFIED ABDOMINAL LOCATION: ICD-10-CM

## 2024-10-03 LAB
ANION GAP SERPL CALCULATED.3IONS-SCNC: 11 MMOL/L (ref 7–15)
BASOPHILS # BLD AUTO: 0 10E3/UL (ref 0–0.2)
BASOPHILS NFR BLD AUTO: 0 %
BUN SERPL-MCNC: 12.2 MG/DL (ref 8–23)
CALCIUM SERPL-MCNC: 9.5 MG/DL (ref 8.8–10.4)
CHLORIDE SERPL-SCNC: 103 MMOL/L (ref 98–107)
CREAT SERPL-MCNC: 0.99 MG/DL (ref 0.67–1.17)
EGFRCR SERPLBLD CKD-EPI 2021: 87 ML/MIN/1.73M2
EOSINOPHIL # BLD AUTO: 0.1 10E3/UL (ref 0–0.7)
EOSINOPHIL NFR BLD AUTO: 1 %
ERYTHROCYTE [DISTWIDTH] IN BLOOD BY AUTOMATED COUNT: 13.6 % (ref 10–15)
GLUCOSE SERPL-MCNC: 154 MG/DL (ref 70–99)
HCO3 SERPL-SCNC: 23 MMOL/L (ref 22–29)
HCT VFR BLD AUTO: 38.2 % (ref 40–53)
HGB BLD-MCNC: 13.5 G/DL (ref 13.3–17.7)
IMM GRANULOCYTES # BLD: 0 10E3/UL
IMM GRANULOCYTES NFR BLD: 1 %
LYMPHOCYTES # BLD AUTO: 0.7 10E3/UL (ref 0.8–5.3)
LYMPHOCYTES NFR BLD AUTO: 14 %
MCH RBC QN AUTO: 33.9 PG (ref 26.5–33)
MCHC RBC AUTO-ENTMCNC: 35.3 G/DL (ref 31.5–36.5)
MCV RBC AUTO: 96 FL (ref 78–100)
MONOCYTES # BLD AUTO: 0.4 10E3/UL (ref 0–1.3)
MONOCYTES NFR BLD AUTO: 7 %
NEUTROPHILS # BLD AUTO: 3.9 10E3/UL (ref 1.6–8.3)
NEUTROPHILS NFR BLD AUTO: 76 %
NRBC # BLD AUTO: 0 10E3/UL
NRBC BLD AUTO-RTO: 0 /100
PLATELET # BLD AUTO: 132 10E3/UL (ref 150–450)
POTASSIUM SERPL-SCNC: 4 MMOL/L (ref 3.4–5.3)
RBC # BLD AUTO: 3.98 10E6/UL (ref 4.4–5.9)
SODIUM SERPL-SCNC: 137 MMOL/L (ref 135–145)
WBC # BLD AUTO: 5.1 10E3/UL (ref 4–11)

## 2024-10-03 PROCEDURE — 96375 TX/PRO/DX INJ NEW DRUG ADDON: CPT

## 2024-10-03 PROCEDURE — 99284 EMERGENCY DEPT VISIT MOD MDM: CPT | Mod: 25

## 2024-10-03 PROCEDURE — 85025 COMPLETE CBC W/AUTO DIFF WBC: CPT

## 2024-10-03 PROCEDURE — 96374 THER/PROPH/DIAG INJ IV PUSH: CPT

## 2024-10-03 PROCEDURE — 250N000011 HC RX IP 250 OP 636

## 2024-10-03 PROCEDURE — 36415 COLL VENOUS BLD VENIPUNCTURE: CPT

## 2024-10-03 PROCEDURE — 99221 1ST HOSP IP/OBS SF/LOW 40: CPT | Performed by: SPECIALIST

## 2024-10-03 PROCEDURE — 80048 BASIC METABOLIC PNL TOTAL CA: CPT

## 2024-10-03 PROCEDURE — 96376 TX/PRO/DX INJ SAME DRUG ADON: CPT

## 2024-10-03 RX ORDER — FENTANYL CITRATE 50 UG/ML
50 INJECTION, SOLUTION INTRAMUSCULAR; INTRAVENOUS ONCE
Status: COMPLETED | OUTPATIENT
Start: 2024-10-03 | End: 2024-10-03

## 2024-10-03 RX ORDER — FENTANYL CITRATE 50 UG/ML
50 INJECTION, SOLUTION INTRAMUSCULAR; INTRAVENOUS EVERY 30 MIN PRN
Status: DISCONTINUED | OUTPATIENT
Start: 2024-10-03 | End: 2024-10-03 | Stop reason: HOSPADM

## 2024-10-03 RX ORDER — POLYETHYLENE GLYCOL 3350 17 G/17G
POWDER, FOR SOLUTION ORAL
Qty: 527 G | Refills: 0 | Status: SHIPPED | OUTPATIENT
Start: 2024-10-03

## 2024-10-03 RX ADMIN — HYDROMORPHONE HYDROCHLORIDE 1 MG: 1 INJECTION, SOLUTION INTRAMUSCULAR; INTRAVENOUS; SUBCUTANEOUS at 08:47

## 2024-10-03 RX ADMIN — FENTANYL CITRATE 50 MCG: 50 INJECTION, SOLUTION INTRAMUSCULAR; INTRAVENOUS at 09:39

## 2024-10-03 RX ADMIN — FENTANYL CITRATE 50 MCG: 50 INJECTION, SOLUTION INTRAMUSCULAR; INTRAVENOUS at 09:21

## 2024-10-03 RX ADMIN — FENTANYL CITRATE 50 MCG: 50 INJECTION, SOLUTION INTRAMUSCULAR; INTRAVENOUS at 10:52

## 2024-10-03 ASSESSMENT — COLUMBIA-SUICIDE SEVERITY RATING SCALE - C-SSRS
2. HAVE YOU ACTUALLY HAD ANY THOUGHTS OF KILLING YOURSELF IN THE PAST MONTH?: NO
1. IN THE PAST MONTH, HAVE YOU WISHED YOU WERE DEAD OR WISHED YOU COULD GO TO SLEEP AND NOT WAKE UP?: NO
6. HAVE YOU EVER DONE ANYTHING, STARTED TO DO ANYTHING, OR PREPARED TO DO ANYTHING TO END YOUR LIFE?: NO

## 2024-10-03 ASSESSMENT — VISUAL ACUITY: OU: 1

## 2024-10-03 ASSESSMENT — ACTIVITIES OF DAILY LIVING (ADL)
ADLS_ACUITY_SCORE: 35
ADLS_ACUITY_SCORE: 35

## 2024-10-03 NOTE — CONSULTS
Consultation - Surgery  Juvenal Blake,  1965, MRN [id number]    Admitting Dx: No admission diagnoses are documented for this encounter.    PCP: Abbey Maynard, [phone]   Code status:  Prior       Extended Emergency Contact Information  Primary Emergency Contact: Nancy Blake  Address: [address] [place] [address] [facility]  Mobile Phone: [phone]  Relation: Spouse  Secondary Emergency Contact: Juvenal Blake   [place]  Mobile Phone: [phone]  Relation: Son       Assessment and Plan   Impression:   Left Inguinal Hernia.  Able to easily reduce.  Not urgent to have it repaired.  No OR time anyway for semielective case.        Plan:  I have given the number for our clinic.  His wife is going to call make an appointment with one of my partners to set up an elective inguinal hernia repair.           Chief Complaint <principal problem not specified>       HPI    We have been requested by the emergency room PA to evaluate Juvenal Blake for an incarcerated inguinal hernia.  This is a 59 year old year old male with a known inguinal hernia that has become increasingly painful over the last couple days.  They were able to get it partially reduced.  He has had no nausea or vomiting.       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)    Leukopenia    Obstructive sleep apnea    Prostate cancer (H)    Recurrent sinusitis    Umbilical hernia    Preoperative examination    Stable angina pectoris (H)    Dyspnea on exertion    Follicular lymphoma (H)    Sepsis (H)    Morbid obesity (H)    Anaphylactoid reaction, initial encounter    DLBCL (diffuse large B cell lymphoma) (H)        [unfilled] Surgical History  Past Surgical History:   Procedure Laterality Date    ABDOMEN SURGERY      for pyloric stenosis as an infant    COLONOSCOPY      CYSTOSCOPY, BIOPSY BLADDER, COMBINED N/A 8/15/2024    Procedure: CYSTOSCOPY WITH BLADDER BIOPSY AND BRASHER PLACEMENT;  Surgeon: Shai Issa MD;  Location: [facility]    CYSTOSCOPY, FULGURATE BLADDER TUMOR, COMBINED N/A 8/15/2024    Procedure: AND FULGURATION;  Surgeon: Shai Issa MD;  Location: [facility]    DISTAL CLAVICLE EXCISION      per H & P     HERNIA REPAIR      inguinal    HERNIA REPAIR, UMBILICAL      INSERT PICC LINE N/A 08/24/2021    Procedure: INSERTION, PICC EXCHANGE, PREVIOUS PICC IS OUT 10 CENTIMETERS;  Surgeon: Christiano Murdock MD;  Location: [facility]    IR CHEST PORT PLACEMENT > 5 YRS OF AGE  02/17/2017    IR LYMPH NODE BIOPSY  10/01/2020    IR LYMPH NODE BIOPSY  05/28/2021    IR LYMPH NODE BIOPSY  12/20/2021    IR PICC PLACEMENT > 5 YRS OF AGE  08/24/2021    LAPAROSCOPIC HERNIORRHAPHY INCISIONAL Right 05/28/2020    Procedure: HERNIORRHAPHY, UMBILICAL, LAPAROSCOPIC; AXILLARY LYMPH NODE BIOPSY;  Surgeon: Rob Farrell MD;  Location: [facility];  Service: General    ORTHOPEDIC SURGERY      OTHER SURGICAL HISTORY Left     shoulder arthroscopy    OTHER SURGICAL HISTORY  01/01/2017    port a cath placement    PICC DOUBLE LUMEN PLACEMENT Right 06/27/2021    47cm (3cm external), Basilic vein    PICC DOUBLE LUMEN PLACEMENT Left 01/17/2022    46 cm 5 fr dl Bard PICC    FL LAP,PROSTATECTOMY,RADICAL,W/NERVE SPARE,INCL ROBOTIC N/A 11/15/2017    Procedure: ROBOTIC ASSISTED RADICAL RETROPUBIC PROSTATECTOMY BILATERAL PELVIC LYMPH NODE DISSECTION ;  Surgeon: Ezio Steele MD;  Location: [facility];  Service: Urology    TONSILLECTOMY      US LYMPH NODE BIOPSY  01/07/2019        Social History  Social History     Tobacco Use    Smoking status: Former     Current packs/day: 0.00     Types:  "Cigarettes     Quit date: 1995     Years since quittin.3     Passive exposure: Past    Smokeless tobacco: Never   Vaping Use    Vaping status: Never Used   Substance Use Topics    Alcohol use: Yes     Comment: 1-2 a day,occ more    Drug use: Yes     Types: Marijuana       Allergies  Allergies   Allergen Reactions    Rituxan [Rituximab] Shortness Of Breath     Wife states \"seizure-like symptoms\"    Levaquin [Levofloxacin]      Significant tendon pain    Ondansetron Itching    Family History  Reviewed, and family history includes Colon Cancer in his mother; Lung Cancer in his maternal grandmother and maternal uncle; Lymphoma in his father; No Known Problems in his brother, brother, sister, sister, sister, sister, son, son, son, and son.  The Family history is not pertinent to the patients chief complaint. Psychosocial Needs  Social History     Social History Narrative    Not on file     Additional psychosocial needs reviewed per nursing assessment.     Prior to Admission Medications   Current Outpatient Medications   Medication Instructions    acyclovir (ZOVIRAX) 400 mg, Oral, EVERY 12 HOURS    albuterol (PROAIR HFA/PROVENTIL HFA/VENTOLIN HFA) 108 (90 Base) MCG/ACT inhaler 2 puffs, Inhalation, EVERY 6 HOURS    atorvastatin (LIPITOR) 10 MG tablet 1 tablet Orally Once a day for cholesterol for 90 days    beclomethasone HFA (QVAR REDIHALER) 80 MCG/ACT inhaler INHALE 1 PUFF BY MOUTH TWICE DAILY WHEN FOR SICK    benzonatate (TESSALON) 100 MG capsule No dose, route, or frequency recorded.    Calcium Polycarbophil (FIBER-CAPS PO) 2 capsules, Oral, DAILY    cephALEXin (KEFLEX) 500 mg, Oral, 3 TIMES DAILY    Doxycycline Hyclate 100 MG TBEC 1 tablet, Oral, 2 TIMES DAILY    fluticasone (FLONASE) 50 MCG/ACT nasal spray USE 2 SPRAYS PER NOSTRIL ONCE A DAY FOR NASAL CONGESTION. 14 DAYS    guaiFENesin-codeine (ROBITUSSIN AC) 100-10 MG/5ML solution TAKE 10 ML BY MOUTH EVERY 6 HOURS FOR 5 DAYS AS NEEDED    loratadine " (CLARITIN) 10 MG tablet TAKE 1 TABLET BY MOUTH EVERY DAY FOR ALLERGIES    medical cannabis (Patient's own supply) 1 Dose, DAILY PRN, (The purpose of this order is to document that the patient reports taking medical cannabis)    megestrol (MEGACE) 20 MG tablet 1 tablet, Oral, 2 TIMES DAILY    mirabegron (MYRBETRIQ) 25 mg, Oral, DAILY    multivitamin w/minerals (THERA-VIT-M) tablet 1 tablet, Oral, DAILY    omeprazole (PRILOSEC) 20 MG DR capsule TAKE 2 CAPSULES(40 MG) BY MOUTH DAILY    polyethylene glycol (MIRALAX) 17 GM/Dose powder Take 17 g (1 Capful) by mouth 2 times daily for 5 days, THEN 17 g (1 Capful) daily as needed.    psyllium (METAMUCIL) 58.6 % packet 1 packet, Oral, DAILY    relugolix (ORGOVYX) 120 MG tablet Oral, DAILY    sildenafil (REVATIO) 100 mg, Oral, DAILY, Monday, Wednesday and Friday    sulfamethoxazole-trimethoprim (BACTRIM DS) 800-160 MG tablet TAKE 1 TABLET BY MOUTH EVERY MONDAY AND TUESDAY TWICE DAILY    tolterodine ER (DETROL LA) 4 mg, Oral, DAILY    XTANDI 40 MG capsule            Review of Systems:  Pertinent items are noted in HPI. Physical Exam:  Temp:  [97.7  F (36.5  C)] 97.7  F (36.5  C)  Pulse:  [59-74] 62  Resp:  [18] 18  BP: (131-163)/(65-85) 131/68  SpO2:  [95 %-99 %] 99 %    General appearance: alert, appears stated age, cooperative, and morbidly obese  Eyes: No scleral icterus    Abdomen: Obese.  Bulge in the left groin that after firm steady pressure I was able to feel reduced.  He immediately had relief.         Pertinent Labs  Lab Results: personally reviewed.   Lab Results   Component Value Date    WBC 5.1 10/03/2024    WBC 2.6 07/08/2021    HGB 13.5 10/03/2024    HGB 7.6 07/08/2021    HCT 38.2 10/03/2024    HCT 24.8 07/08/2021    MCV 96 10/03/2024    MCV 93 07/08/2021     10/03/2024     07/08/2021        Pertinent Radiology  Radiology Results: No imaging

## 2024-10-03 NOTE — ED TRIAGE NOTES
Patient presents here with left groin pain. He has a known groin hernia. Pain is radiating to the testicle. He also is currently being treated for prostate cancer with hormone therapy and is in remission from lymphoma.     Triage Assessment (Adult)       Row Name 10/03/24 0808          Triage Assessment    Airway WDL WDL        Respiratory WDL    Respiratory WDL WDL        Skin Circulation/Temperature WDL    Skin Circulation/Temperature WDL WDL        Cardiac WDL    Cardiac WDL WDL        Peripheral/Neurovascular WDL    Peripheral Neurovascular WDL WDL        Cognitive/Neuro/Behavioral WDL    Cognitive/Neuro/Behavioral WDL WDL

## 2024-10-03 NOTE — ED PROVIDER NOTES
Emergency Department Encounter  [facility] EMERGENCY DEPARTMENT  Juvenal Blake   AGE: 59 year old SEX: male  YOB: 1965  MRN: [id number]      EVALUATION DATE & TIME: No admission date for patient encounter. ; PCP: Abbey Maynard   ED PROVIDER: Odalis Sequeira PA-C    CHIEF COMPLAINT: Groin Pain      FINAL IMPRESSION       ICD-10-CM    1. Left inguinal hernia  K40.90           MEDICAL DECISION MAKING   59 year old male with a pertinent history of diffuse large B-cell lymphoma (in remission), prostate cancer s/p hormone therapy and radical prostatectomy and bilateral lymph node dissection (11/2017), hypogammaglobulinemia, stable angina, and NSTEMI who presents to the ED for evaluation of left groin pain that has become more painful and constant over the last 3 to 4 days.  Patient endorses a history of left inguinal hernia that was evaluated by general surgery but not operated on as patient chose to watch and wait option.  Still passing gas, last BM yesterday.  No fevers, chills, nausea, or vomiting.  No urinary symptoms.    Exam: Vitals reviewed and hemodynamically stable, afebrile.  On exam, patient is well-appearing in no acute distress.  There is a soft mass noted in the left groin.   exam otherwise unremarkable with cremasteric reflex intact.    Acutely serious/life threatening considerations: scrotal cellulitis, epididymitis, Axel's gangrene, orchitis, scrotal abscess, testicular torsion, sexually-transmitted infection, diverticulitis, and appendicitis.    ED course:   ED Course as of 10/03/24 1132   Thu Oct 03, 2024   0817 I met and introduced myself to the patient. I gathered initial history and performed my physical exam.    0831 Because patient has known left inguinal hernia, will move forward with attempt at reduction.  Low concern for strangulated or incarcerated hernia given no pain out of proportion, soft bowel, no overlying skin changes.  IV analgesia  ordered and patient placed supine in Trendelenburg position with cool packs to the hernia site.   0909 Basic metabolic panel(!)  BMP without hypo/hyperglycemia, metabolic acidosis, and emergent electrolyte derangement. Kidney function maintained.   0910 RBC Count(!): 3.98  Chronic. RBC 3.53 on 07/05/2024 0910 Hematocrit(!): 38.2  Chronic. Hematocrit 34.3 on 07/05/2024   0911 MCH(!): 33.9  Chronic. MCH 33.9 on 07/05/2024 0911 Platelet Count(!): 132  Chronic. Platelets 111 on 07/05/2024 0919 Patient still reporting pain 8/10.  Will do 50 mcg of fentanyl and then attempt reduction.   0931 Reduction attempt #1 unsuccessful. I have staffed the patient with Dr. Miguel A Gracia, ED MD, who has evaluated the patient and agrees with all aspects of today's care.    1008 Symptom reduction.  Unable to reduce.  Will consult general surgery.   1014 General surgery consulted. Last food and drink intake 7am   1026 Spoke to general surgery (MD Chiquita) who is coming down to ED to try and reduce hernia. If unsuccessful. Plan for OR intervention. Per surgery, no need for additional CT imaging.    1040 [place]qutia was able to reduce hernia in ED. patient reports complete relief of symptoms.     1044 I rechecked the patient and discussed results, discharge, and follow up. Questions were answered. The patient or family acknowledged understanding and was agreeable with the care plan. Clear, written instructions of potential danger signs and where and when to return for emergency care or re-evaluation provided.        Medications given today in the emergency department:  Medications   fentaNYL (PF) (SUBLIMAZE) injection 50 mcg (50 mcg Intravenous $Given 10/3/24 1052)   HYDROmorphone (DILAUDID) injection 1 mg (1 mg Intravenous $Given 10/3/24 0825)   fentaNYL (PF) (SUBLIMAZE) injection 50 mcg (50 mcg Intravenous $Given 10/3/24 0921)   fentaNYL (PF) (SUBLIMAZE) injection 50 mcg (50 mcg Intravenous $Given 10/3/24 0939)     Consults: General  surgery (MD Chiquita)    Assessment: Presentation consistent with left inguinal hernia.  Considered ordering testicular ultrasound to assess for torsion, epididymitis, and testicular mass but elected against this as patient has had complete resolution of symptoms with hernia reduction in ED. Overall, no red flag s/s present to suggest an acutely serious or life threatening condition that would necessitate hospitalization.     Plan: Plan to discharge home with close general surgery follow-up.  Advised patient not to lift heavy objects or bear down.  MiraLAX prescription provided to soften stools. At the conclusion of the encounter we discussed the results of all of the tests and the disposition.     New prescriptions started at today's ED visit:   Discharge Medication List as of 10/3/2024 10:55 AM        START taking these medications    Details   polyethylene glycol (MIRALAX) 17 GM/Dose powder Take 17 g (1 Capful) by mouth 2 times daily for 5 days, THEN 17 g (1 Capful) daily as needed., Disp-527 g, R-0, Local Print             Medical Decision Making  Obtained supplemental history:Supplemental history obtained?: Family Member/Significant Other  Reviewed external records: External records reviewed?: Documented in chart  Care impacted by chronic illness:Cancer/Chemotherapy, Heart Disease, Hyperlipidemia, and Other: diffuse large B-cell lymphoma, hypogammaglobulinemia  Care significantly affected by social determinants of health:N/A  Did you consider but not order tests?: Work up considered but not performed and documented in chart, if applicable  Did you interpret images independently?: Independent interpretation of ECG and images noted in documentation, when applicable.  Consultation discussion with other provider:Did you involve another provider (consultant, , pharmacy, etc.)?: No  Discharge. I prescribed additional prescription strength medication(s) as charted. See documentation for any additional details.    Not  Applicable     =================================================================      HISTORY OF PRESENT ILLNESS   Patient information was obtained from: Patient, significant other  Use of Intrepreter: N/A     Juvenal Blake is a 59 year old male with a pertinent history of diffuse large B-cell lymphoma (in remission), prostate cancer s/p hormone therapy and radical prostatectomy and bilateral lymph node dissection (11/2017), hypogammaglobulinemia, stable angina, and NSTEMI who presents to the ED by private vehicle for evaluation of groin pain.  Patient reports a history of left inguinal hernia that has been evaluated by general surgery earlier this year.  He reports over the last 3 to 4 days, his left groin pain has worsened and become more constant.  He has difficulty laying down and notices that the pain worsens when having a bowel movement.  He is still passing gas, last bowel movement yesterday morning.  No urinary symptoms or pain with urination.  No penile discharge or concerns for sexually transmitted infections.  No fevers, chills, nausea, or vomiting.  No medications taken prior to arrival.      Per chart review,  08/15/2024 - Patient underwent cystoscopy with bladder biopsy and moreira placement with Shai Issa MD.   06/11/2024 - Patient seen by general surgery for let inguinal mass.  Patient elected for watchful waiting over surgical repair.   CT imaging (12/22/2023): Bilateral inguinal hernias. The left inguinal hernia contains a small knuckle of the sigmoid colon without findings to suggest obstruction.  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)     Low serum IgG for age     Non Hodgkin's lymphoma (H)     Non Hodgkin's lymphoma (H)     Osteopenia     Prostate CA  (H)     Shortness of breath     Sleep apnea     Thrombocytopenia (H)        Past Surgical History:   Procedure Laterality Date    ABDOMEN SURGERY      for pyloric stenosis as an infant    COLONOSCOPY      CYSTOSCOPY, BIOPSY BLADDER, COMBINED N/A 8/15/2024    Procedure: CYSTOSCOPY WITH BLADDER BIOPSY AND BRASHER PLACEMENT;  Surgeon: Shai Issa MD;  Location: [facility] [place]    CYSTOSCOPY, FULGURATE BLADDER TUMOR, COMBINED N/A 8/15/2024    Procedure: AND FULGURATION;  Surgeon: Shai Issa MD;  Location: [facility] [place]    DISTAL CLAVICLE EXCISION      per H & P     HERNIA REPAIR      inguinal    HERNIA REPAIR, UMBILICAL      INSERT PICC LINE N/A 08/24/2021    Procedure: INSERTION, PICC EXCHANGE, PREVIOUS PICC IS OUT 10 CENTIMETERS;  Surgeon: Christiano Murodck MD;  [place] [facility] [place]    IR CHEST PORT PLACEMENT > 5 YRS OF AGE  02/17/2017    IR LYMPH NODE BIOPSY  10/01/2020    IR LYMPH NODE BIOPSY  05/28/2021    IR LYMPH NODE BIOPSY  12/20/2021    IR PICC PLACEMENT > 5 YRS OF AGE  08/24/2021    LAPAROSCOPIC HERNIORRHAPHY INCISIONAL Right 05/28/2020    Procedure: HERNIORRHAPHY, UMBILICAL, LAPAROSCOPIC; AXILLARY LYMPH NODE BIOPSY;  Surgeon: Rob Farrell MD;  Location: [facility];  Service: General    ORTHOPEDIC SURGERY      OTHER SURGICAL HISTORY Left     shoulder arthroscopy    OTHER SURGICAL HISTORY  01/01/2017    port a cath placement    PICC DOUBLE LUMEN PLACEMENT Right 06/27/2021    47cm (3cm external), Basilic vein    PICC DOUBLE LUMEN PLACEMENT Left 01/17/2022    46 cm 5 fr dl Bard PICC    UT LAP,PROSTATECTOMY,RADICAL,W/NERVE SPARE,INCL ROBOTIC N/A 11/15/2017    Procedure: ROBOTIC ASSISTED RADICAL RETROPUBIC PROSTATECTOMY BILATERAL PELVIC LYMPH NODE DISSECTION ;  Surgeon: Ezio Steele MD;  Location: [facility];  Service: Urology    TONSILLECTOMY      US LYMPH NODE BIOPSY  01/07/2019       Family History   Problem Relation Age of Onset    Colon Cancer Mother          diagnosed in her late 40s    Lymphoma Father         non-Hodgkins lymphoma, diagnosed 70s    No Known Problems Brother     No Known Problems Sister     No Known Problems Son     No Known Problems Sister     No Known Problems Son     No Known Problems Sister     No Known Problems Brother     No Known Problems Son     No Known Problems Sister     No Known Problems Son     Lung Cancer Maternal Grandmother     Lung Cancer Maternal Uncle        Social History     Tobacco Use    Smoking status: Former     Current packs/day: 0.00     Types: Cigarettes     Quit date: 1995     Years since quittin.3     Passive exposure: Past    Smokeless tobacco: Never   Vaping Use    Vaping status: Never Used   Substance Use Topics    Alcohol use: Yes     Comment: 1-2 a day,occ more    Drug use: Yes     Types: Marijuana       Most Recent Immunizations   Administered Date(s) Administered    COVID-19 12+ (Pfizer) 10/14/2023    COVID-19 MONOVALENT 12+ (Pfizer) 09/10/2021    Flu, Unspecified 2023    Hepatitis B, Adult 2024    Influenza (H1N1) 2009    Influenza (IIV3) PF 10/19/2013    Influenza Vaccine >6 months,quad, PF 10/14/2023    Influenza Vaccine, 6+MO IM (QUADRIVALENT W/PRESERVATIVES) 2014    Influenza, Intradermal, Quad, Pf 09/10/2020    Influenza, seasonal, injectable, PF 2016    Influenza,INJ,MDCK,PF,Quad >6mo(Flucelvax) 10/12/2020    Pneumo Conj 13-V (2010&after) 2023    Pneumococcal 23 valent 2019    Pneumococcal, Unspecified 2022    RSV Vaccine (Abrysvo) 2024    TDAP (Adacel,Boostrix) 2024    Td (Adult), Adsorbed 2003    Zoster recombinant adjuvanted (SHINGRIX) 2018        acyclovir (ZOVIRAX) 800 MG tablet  albuterol (PROAIR HFA/PROVENTIL HFA/VENTOLIN HFA) 108 (90 Base) MCG/ACT inhaler  atorvastatin (LIPITOR) 10 MG tablet  beclomethasone HFA (QVAR REDIHALER) 80 MCG/ACT inhaler  benzonatate (TESSALON) 100 MG capsule  Calcium Polycarbophil (FIBER-CAPS  "PO)  cephALEXin (KEFLEX) 500 MG capsule  Doxycycline Hyclate 100 MG TBEC  fluticasone (FLONASE) 50 MCG/ACT nasal spray  guaiFENesin-codeine (ROBITUSSIN AC) 100-10 MG/5ML solution  loratadine (CLARITIN) 10 MG tablet  medical cannabis (Patient's own supply)  megestrol (MEGACE) 20 MG tablet  mirabegron (MYRBETRIQ) 25 MG 24 hr tablet  multivitamin w/minerals (THERA-VIT-M) tablet  omeprazole (PRILOSEC) 20 MG DR capsule  polyethylene glycol (MIRALAX) 17 GM/Dose powder  psyllium (METAMUCIL) 58.6 % packet  relugolix (ORGOVYX) 120 MG tablet  sildenafil (REVATIO) 20 MG tablet  sulfamethoxazole-trimethoprim (BACTRIM DS) 800-160 MG tablet  tolterodine ER (DETROL LA) 4 MG 24 hr capsule  XTANDI 40 MG capsule        PHYSICAL EXAM   VITALS:  Patient Vitals for the past 24 hrs:   BP Temp Temp src Pulse Resp SpO2 Height Weight   10/03/24 1045 -- -- -- 69 -- 99 % -- --   10/03/24 1030 131/68 -- -- 62 -- 99 % -- --   10/03/24 1015 -- -- -- 65 -- 98 % -- --   10/03/24 1000 (!) 140/65 -- -- 60 -- 96 % -- --   10/03/24 0945 -- -- -- 59 -- 96 % -- --   10/03/24 0930 (!) 142/66 -- -- 63 -- 97 % -- --   10/03/24 0915 -- -- -- 63 -- 96 % -- --   10/03/24 0900 (!) 154/77 -- -- 63 -- 95 % -- --   10/03/24 0845 (!) 153/85 -- -- -- -- 99 % -- --   10/03/24 0805 (!) 163/84 97.7  F (36.5  C) Oral 74 18 99 % 1.702 m (5' 7\") 104.3 kg (230 lb)     Body mass index is 36.02 kg/m .     Physical Exam  Vitals and nursing note reviewed.   Constitutional:       General: He is awake. He is not in acute distress.     Appearance: He is well-developed and well-groomed. He is not ill-appearing, toxic-appearing or diaphoretic.   HENT:      Head: Normocephalic and atraumatic.      Right Ear: Hearing, tympanic membrane, ear canal and external ear normal.      Left Ear: Hearing, tympanic membrane, ear canal and external ear normal.      Nose: Nose normal. No congestion.      Mouth/Throat:      Mouth: Mucous membranes are moist. No oral lesions.      Pharynx: " Oropharynx is clear. Uvula midline.      Tonsils: No tonsillar exudate.   Eyes:      General: Vision grossly intact. Gaze aligned appropriately.      Conjunctiva/sclera: Conjunctivae normal.   Cardiovascular:      Rate and Rhythm: Normal rate.      Heart sounds: S1 normal and S2 normal.   Pulmonary:      Effort: No accessory muscle usage or respiratory distress.      Breath sounds: No decreased breath sounds, wheezing, rhonchi or rales.   Abdominal:      General: There is no distension.      Palpations: Abdomen is soft. There is no mass.      Tenderness: There is no abdominal tenderness.      Hernia: A hernia (Soft bowel extending into the left testicle.) is present. Hernia is present in the left inguinal area.   Genitourinary:     Pubic Area: No rash.       Penis: Normal. No phimosis, paraphimosis, tenderness or discharge.       Testes: Normal. Cremasteric reflex is present.      Epididymis:      Right: Normal.      Left: Normal.   Musculoskeletal:      Cervical back: Full passive range of motion without pain. No rigidity.      Right lower leg: No edema.      Left lower leg: No edema.      Comments: Moving all 4 extremities intentionally and without pain. No joint swelling, redness, or obvious deformity.   Skin:     General: Skin is warm.      Capillary Refill: Capillary refill takes less than 2 seconds.      Coloration: Skin is not cyanotic or pale.      Findings: No rash.   Neurological:      General: No focal deficit present.      Mental Status: He is alert and oriented to person, place, and time. Mental status is at baseline.   Psychiatric:      Comments: Thoughts linear and responses appropriate. Cooperative. Appropriate mood and affect.        LABS & IMAGING   All pertinent labs and imaging reviewed and interpreted.  Results for orders placed or performed during the hospital encounter of 10/03/24   Basic metabolic panel   Result Value Ref Range    Sodium 137 135 - 145 mmol/L    Potassium 4.0 3.4 - 5.3 mmol/L     Chloride 103 98 - 107 mmol/L    Carbon Dioxide (CO2) 23 22 - 29 mmol/L    Anion Gap 11 7 - 15 mmol/L    Urea Nitrogen 12.2 8.0 - 23.0 mg/dL    Creatinine 0.99 0.67 - 1.17 mg/dL    GFR Estimate 87 >60 mL/min/1.73m2    Calcium 9.5 8.8 - 10.4 mg/dL    Glucose 154 (H) 70 - 99 mg/dL   CBC with platelets and differential   Result Value Ref Range    WBC Count 5.1 4.0 - 11.0 10e3/uL    RBC Count 3.98 (L) 4.40 - 5.90 10e6/uL    Hemoglobin 13.5 13.3 - 17.7 g/dL    Hematocrit 38.2 (L) 40.0 - 53.0 %    MCV 96 78 - 100 fL    MCH 33.9 (H) 26.5 - 33.0 pg    MCHC 35.3 31.5 - 36.5 g/dL    RDW 13.6 10.0 - 15.0 %    Platelet Count 132 (L) 150 - 450 10e3/uL    % Neutrophils 76 %    % Lymphocytes 14 %    % Monocytes 7 %    % Eosinophils 1 %    % Basophils 0 %    % Immature Granulocytes 1 %    NRBCs per 100 WBC 0 <1 /100    Absolute Neutrophils 3.9 1.6 - 8.3 10e3/uL    Absolute Lymphocytes 0.7 (L) 0.8 - 5.3 10e3/uL    Absolute Monocytes 0.4 0.0 - 1.3 10e3/uL    Absolute Eosinophils 0.1 0.0 - 0.7 10e3/uL    Absolute Basophils 0.0 0.0 - 0.2 10e3/uL    Absolute Immature Granulocytes 0.0 <=0.4 10e3/uL    Absolute NRBCs 0.0 10e3/uL     Odalis Sequeira PA-C   Emergency Medicine   [facility] EMERGENCY DEPARTMENT  [facility] [facility] [address] [phone]  Dept: [phone]     Odalis Sequeira PA-C  10/03/24 1803

## 2024-10-03 NOTE — ED PROVIDER NOTES
"Emergency Department Midlevel Supervisory Note     I had a face to face encounter with this patient seen by the Advanced Practice Provider (NAYA). I personally made/approved the management plan and take responsibility for the patient management. I personally saw patient and performed a substantive portion of the visit including all aspects of the medical decision making.     ED Course:  0929 Odalis Sequeira PA-C staffed patient with me. I agree with their assessment and plan of management, and I will see the patient.  0945 I met with the patient to introduce myself, gather additional history, perform my initial exam, and discuss the plan.     Brief HPI:     Juvenal Blake is a 59 year old male who presents for evaluation of left inguinal hernia.  Worsening abdominal pain over the past 3 days.  Still passing gas and last bowel movement was yesterday.  No nausea, vomiting, chills or urinary symptoms.    Brief Physical Exam: /68   Pulse 69   Temp 97.7  F (36.5  C) (Oral)   Resp 18   Ht 1.702 m (5' 7\")   Wt 104.3 kg (230 lb)   SpO2 99%   BMI 36.02 kg/m    Constitutional:  Alert, in no acute distress  EYES: Conjunctivae clear  HENT:  Atraumatic  Respiratory:  Respirations even, unlabored, in no acute respiratory distress  Cardiovascular:  Regular rate and rhythm, good peripheral perfusion  GI: Obvious bulge in his left inguinal canal with tenderness overlying, no skin changes rest of his abdominal exam is benign.   exam: Bulge extends into his left hemiscrotum with palpable peristalsing intestine.  No overlying skin changes.  Bilateral testicles are distended and nontender or swollen.  Musculoskeletal:  Moves all 4 extremities equally, grossly symmetrical strength  Integument: Warm & dry. No appreciable rash, erythema.  Neurologic:  Alert & oriented, speech clear and fluent, no focal deficits noted  Psych: Normal mood and affect       MDM:    Patient presenting with known left inguinal hernia.  Case was " signed out to me by PA as she was unable to reduce it at bedside.  Give additional pain medications and attempted bedside reduction.  Unfortunately were unable to reduce the left indirect hernia despite Trendelenburg, ice and adequate pain control.  Will consult general surgery.    General surgery consultedand was able to reduce the incarcerated hernia.  Does not have evidence of strangulation on physical exam.  Still passing gas and having bowel movements.  Will follow-up with general surgery.  Patient is agreeable with plan.    ED Course as of 10/03/24 1640   u Oct 03, 2024   0817 I met and introduced myself to the patient. I gathered initial history and performed my physical exam.    0831 Because patient has known left inguinal hernia, will move forward with attempt at reduction.  Low concern for strangulated or incarcerated hernia given no pain out of proportion, soft bowel, no overlying skin changes.  IV analgesia ordered and patient placed supine in Trendelenburg position with cool packs to the hernia site.   0909 Basic metabolic panel(!)  BMP without hypo/hyperglycemia, metabolic acidosis, and emergent electrolyte derangement. Kidney function maintained.   0910 RBC Count(!): 3.98  Chronic. RBC 3.53 on 07/05/2024   0910 Hematocrit(!): 38.2  Chronic. Hematocrit 34.3 on 07/05/2024   0911 MCH(!): 33.9  Chronic. MCH 33.9 on 07/05/2024   0911 Platelet Count(!): 132  Chronic. Platelets 111 on 07/05/2024   0919 Patient still reporting pain 8/10.  Will do 50 mcg of fentanyl and then attempt reduction.   0931 Reduction attempt #1 unsuccessful. I have staffed the patient with Dr. Miguel A Gracia ED MD, who has evaluated the patient and agrees with all aspects of today's care.    1008 Symptom reduction.  Unable to reduce.  Will consult general surgery.   1014 General surgery consulted. Last food and drink intake 7am   1026 Spoke to general surgery (MD Chiquita) who is coming down to ED to try and reduce hernia. If  unsuccessful. Plan for OR intervention. Per surgery, no need for additional CT imaging.    1040 Chiquita was able to reduce hernia in ED. patient reports complete relief of symptoms.     1044 I rechecked the patient and discussed results, discharge, and follow up. Questions were answered. The patient or family acknowledged understanding and was agreeable with the care plan. Clear, written instructions of potential danger signs and where and when to return for emergency care or re-evaluation provided.        1. Left inguinal hernia    2. Abdominal pain, unspecified abdominal location            Labs and Imaging:  Results for orders placed or performed during the hospital encounter of 10/03/24   Basic metabolic panel   Result Value Ref Range    Sodium 137 135 - 145 mmol/L    Potassium 4.0 3.4 - 5.3 mmol/L    Chloride 103 98 - 107 mmol/L    Carbon Dioxide (CO2) 23 22 - 29 mmol/L    Anion Gap 11 7 - 15 mmol/L    Urea Nitrogen 12.2 8.0 - 23.0 mg/dL    Creatinine 0.99 0.67 - 1.17 mg/dL    GFR Estimate 87 >60 mL/min/1.73m2    Calcium 9.5 8.8 - 10.4 mg/dL    Glucose 154 (H) 70 - 99 mg/dL   CBC with platelets and differential   Result Value Ref Range    WBC Count 5.1 4.0 - 11.0 10e3/uL    RBC Count 3.98 (L) 4.40 - 5.90 10e6/uL    Hemoglobin 13.5 13.3 - 17.7 g/dL    Hematocrit 38.2 (L) 40.0 - 53.0 %    MCV 96 78 - 100 fL    MCH 33.9 (H) 26.5 - 33.0 pg    MCHC 35.3 31.5 - 36.5 g/dL    RDW 13.6 10.0 - 15.0 %    Platelet Count 132 (L) 150 - 450 10e3/uL    % Neutrophils 76 %    % Lymphocytes 14 %    % Monocytes 7 %    % Eosinophils 1 %    % Basophils 0 %    % Immature Granulocytes 1 %    NRBCs per 100 WBC 0 <1 /100    Absolute Neutrophils 3.9 1.6 - 8.3 10e3/uL    Absolute Lymphocytes 0.7 (L) 0.8 - 5.3 10e3/uL    Absolute Monocytes 0.4 0.0 - 1.3 10e3/uL    Absolute Eosinophils 0.1 0.0 - 0.7 10e3/uL    Absolute Basophils 0.0 0.0 - 0.2 10e3/uL    Absolute Immature Granulocytes 0.0 <=0.4 10e3/uL    Absolute NRBCs 0.0 10e3/uL       I  have reviewed the relevant laboratory studies above.      Procedures:  I was present for the key portions of procedures documented in NAYA/midlevel note, see midlevel note for further details.    Miguel A Gracia MD  [facility] EMERGENCY DEPARTMENT  [address] [place] [id number]  [phone]       Miguel A Gracia MD  10/03/24 1640

## 2024-10-03 NOTE — DISCHARGE INSTRUCTIONS
General surgery was able to reduce your left inguinal hernia while you were in the emergency department. Please follow up with them.     Please follow-up with them for further outpatient management.    Avoid heavy lifting and straining.  Take prescribed MiraLAX for stool softening.     Call your doctor now or seek immediate medical care if:    You have new or worse belly pain.     You are vomiting.     You cannot pass stools or gas.     You cannot push the hernia back into place with gentle pressure when you are lying down.     The area over the hernia turns red or becomes tender.   Watch closely for changes in your health, and be sure to contact your doctor if you have any problems.

## 2024-10-04 ENCOUNTER — OFFICE VISIT (OUTPATIENT)
Dept: SURGERY | Facility: CLINIC | Age: 59
End: 2024-10-04
Payer: COMMERCIAL

## 2024-10-04 VITALS
HEIGHT: 67 IN | DIASTOLIC BLOOD PRESSURE: 80 MMHG | BODY MASS INDEX: 35.31 KG/M2 | SYSTOLIC BLOOD PRESSURE: 124 MMHG | WEIGHT: 225 LBS

## 2024-10-04 DIAGNOSIS — K40.90 LEFT INGUINAL HERNIA: Primary | ICD-10-CM

## 2024-10-04 PROCEDURE — 99213 OFFICE O/P EST LOW 20 MIN: CPT | Performed by: SPECIALIST

## 2024-10-04 RX ORDER — ACETAMINOPHEN 325 MG/1
975 TABLET ORAL ONCE
Status: CANCELLED | OUTPATIENT
Start: 2024-10-04 | End: 2024-10-04

## 2024-10-04 NOTE — PROGRESS NOTES
HPI:  This is a 59 year old male here today with concerns of pain and bulging in his left groin area. He has noted this for the past a a few  month. The symptoms have progressed and increased over this time. He comes in for evaluation secondary to the hernia causing enough issues to bother them with daily activities or chores.    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)     Low serum IgG for age     Non Hodgkin's lymphoma (H)     Non Hodgkin's lymphoma (H)     Osteopenia     Prostate CA (H)     Shortness of breath     Sleep apnea     CPAP    Thrombocytopenia (H)        Past Surgical History:   Procedure Laterality Date    ABDOMEN SURGERY      for pyloric stenosis as an infant    COLONOSCOPY      CYSTOSCOPY, BIOPSY BLADDER, COMBINED N/A 8/15/2024    Procedure: CYSTOSCOPY WITH BLADDER BIOPSY AND BRASHER PLACEMENT;  Surgeon: Shai Issa MD;  [place] [facility] [place]    CYSTOSCOPY, FULGURATE BLADDER TUMOR, COMBINED N/A 8/15/2024    Procedure: AND FULGURATION;  Surgeon: Shai Issa MD;  [place] [facility] [place]    DISTAL CLAVICLE EXCISION      per H & P     HERNIA REPAIR      inguinal    HERNIA REPAIR, UMBILICAL      INSERT PICC LINE N/A 08/24/2021    Procedure: INSERTION, PICC EXCHANGE, PREVIOUS PICC IS OUT 10 CENTIMETERS;  Surgeon: Christiano Murdock MD;  [place] [facility] [place]    IR CHEST PORT PLACEMENT > 5 YRS OF AGE  02/17/2017    IR LYMPH NODE BIOPSY  10/01/2020    IR LYMPH NODE BIOPSY  05/28/2021    IR LYMPH NODE BIOPSY  12/20/2021    IR PICC PLACEMENT > 5 YRS OF AGE  08/24/2021    LAPAROSCOPIC HERNIORRHAPHY INCISIONAL Right 05/28/2020    Procedure:  HERNIORRHAPHY, UMBILICAL, LAPAROSCOPIC; AXILLARY LYMPH NODE BIOPSY;  Surgeon: Rob Farrell MD;  Location: [facility];  Service: General    ORTHOPEDIC SURGERY      OTHER SURGICAL HISTORY Left     shoulder arthroscopy    OTHER SURGICAL HISTORY  01/01/2017    port a cath placement    PICC DOUBLE LUMEN PLACEMENT Right 06/27/2021    47cm (3cm external), Basilic vein    PICC DOUBLE LUMEN PLACEMENT Left 01/17/2022    46 cm 5 fr dl Bard PICC    TX LAP,PROSTATECTOMY,RADICAL,W/NERVE SPARE,INCL ROBOTIC N/A 11/15/2017    Procedure: ROBOTIC ASSISTED RADICAL RETROPUBIC PROSTATECTOMY BILATERAL PELVIC LYMPH NODE DISSECTION ;  Surgeon: Ezio Steele MD;  Location: [facility];  Service: Urology    TONSILLECTOMY      US LYMPH NODE BIOPSY  01/07/2019       CURRENT MEDS:  Current Facility-Administered Medications   Medication Dose Route Frequency Provider Last Rate Last Admin       Family History   Problem Relation Age of Onset    Colon Cancer Mother         diagnosed in her late 40s    Lymphoma Father         non-Hodgkins lymphoma, diagnosed 70s    No Known Problems Brother     No Known Problems Sister     No Known Problems Son     No Known Problems Sister     No Known Problems Son     No Known Problems Sister     No Known Problems Brother     No Known Problems Son     No Known Problems Sister     No Known Problems Son     Lung Cancer Maternal Grandmother     Lung Cancer Maternal Uncle         reports that he quit smoking about 29 years ago. His smoking use included cigarettes. He has been exposed to tobacco smoke. He has never used smokeless tobacco. He reports current alcohol use. He reports current drug use. Drug: Marijuana.    Review of Systems - Negative except left groin pain. And bulge he was in the emergency room yesterday in acute phase and Dr. Sharma reduce his hernia here for looking for definitive care.Otherwise twelve system of review is negative.      Vitals:    10/04/24 1159   BP: 124/80  "  Weight: 102.1 kg (225 lb)   Height: 1.702 m (5' 7\")       Body mass index is 35.24 kg/m .    EXAM:  General: NAD   HEENT: normocephalic, PERRLA and EOMS intact  Mounth: Mucus membranes moist  Neck: Supple  Chest: Clear to auscultation bilaterally  CV: RRR  ABD: Soft nontender and nondistended, left inguinal hernia, reducible  EXT: Warm, pulses intact,   Neuro: Alert and oriented x3  Back: no CVA tenderness        Assessment/Plan: Pt with a left inguinal hernia. I discussed this at length with He.  I went over conservative management as well as surgical treatment of this.. I would plan on doing this via anopen  approach with possible use of mesh. I went over the small risks of surgery including but not limited to bleeding and infection, anesthesia, recurrence rates and nerve injury. I discussed the expected recovery time as well. Will get this scheduled. He will contact us to have this scheduled.      Vern Tobar MD ,MD Vern Tobar MD  General Surgery [phone]  Vascular Surgery [phone]      "

## 2024-10-04 NOTE — LETTER
10/4/2024      Juvenal Blake  [address] [place] [address]      Dear Colleague,    Thank you for referring your patient, Juvenal Blake, to the [facility] [place]. Please see a copy of my visit note below.          HPI:  This is a 59 year old male here today with concerns of pain and bulging in his left groin area. He has noted this for the past a a few  month. The symptoms have progressed and increased over this time. He comes in for evaluation secondary to the hernia causing enough issues to bother them with daily activities or chores.    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)      Low serum IgG for age      Non Hodgkin's lymphoma (H)      Non Hodgkin's lymphoma (H)      Osteopenia      Prostate CA (H)      Shortness of breath      Sleep apnea     CPAP     Thrombocytopenia (H)        Past Surgical History:   Procedure Laterality Date     ABDOMEN SURGERY      for pyloric stenosis as an infant     COLONOSCOPY       CYSTOSCOPY, BIOPSY BLADDER, COMBINED N/A 8/15/2024    Procedure: CYSTOSCOPY WITH BLADDER BIOPSY AND BRASHER PLACEMENT;  Surgeon: Shai Issa MD;  Location: [facility] OR     CYSTOSCOPY, FULGURATE BLADDER TUMOR, COMBINED N/A 8/15/2024    Procedure: AND FULGURATION;  Surgeon: Shai Issa MD;  Location: [facility] OR     DISTAL CLAVICLE EXCISION      per H & P      HERNIA REPAIR      inguinal     HERNIA REPAIR, UMBILICAL       INSERT PICC LINE N/A 08/24/2021    Procedure: INSERTION, PICC EXCHANGE, PREVIOUS PICC IS OUT 10 CENTIMETERS;  Surgeon: Christiano Murdock MD;  [place] [facility] [place]      IR CHEST PORT PLACEMENT > 5 YRS OF AGE  02/17/2017     IR LYMPH NODE BIOPSY  10/01/2020     IR LYMPH NODE BIOPSY  05/28/2021     IR LYMPH NODE BIOPSY  12/20/2021     IR PICC PLACEMENT > 5 YRS OF AGE  08/24/2021     LAPAROSCOPIC HERNIORRHAPHY INCISIONAL Right 05/28/2020    Procedure: HERNIORRHAPHY, UMBILICAL, LAPAROSCOPIC; AXILLARY LYMPH NODE BIOPSY;  Surgeon: Rob Farrell MD;  Location[facility];  Service: General     ORTHOPEDIC SURGERY       OTHER SURGICAL HISTORY Left     shoulder arthroscopy     OTHER SURGICAL HISTORY  01/01/2017    port a cath placement     PICC DOUBLE LUMEN PLACEMENT Right 06/27/2021    47cm (3cm external), Basilic vein     PICC DOUBLE LUMEN PLACEMENT Left 01/17/2022    46 cm 5 fr dl Bard PICC     VA LAP,PROSTATECTOMY,RADICAL,W/NERVE SPARE,INCL ROBOTIC N/A 11/15/2017    Procedure: ROBOTIC ASSISTED RADICAL RETROPUBIC PROSTATECTOMY BILATERAL PELVIC LYMPH NODE DISSECTION ;  Surgeon: Ezio Steele MD;  Location: [facility];  Service: Urology     TONSILLECTOMY       US LYMPH NODE BIOPSY  01/07/2019       CURRENT MEDS:  Current Facility-Administered Medications   Medication Dose Route Frequency Provider Last Rate Last Admin       Family History   Problem Relation Age of Onset     Colon Cancer Mother         diagnosed in her late 40s     Lymphoma Father         non-Hodgkins lymphoma, diagnosed 70s     No Known Problems Brother      No Known Problems Sister      No Known Problems Son      No Known Problems Sister      No Known Problems Son      No Known Problems Sister      No Known Problems Brother      No Known Problems Son      No Known Problems Sister      No Known Problems Son      Lung Cancer Maternal Grandmother      Lung Cancer Maternal Uncle         reports that he quit smoking about 29 years ago. His smoking use included cigarettes. He has been exposed to tobacco smoke. He has never used smokeless tobacco. He reports current alcohol use. He reports current  "drug use. Drug: Marijuana.    Review of Systems - Negative except left groin pain. And bulge he was in the emergency room yesterday in acute phase and Dr. Sharma reduce his hernia here for looking for definitive care.Otherwise twelve system of review is negative.      Vitals:    10/04/24 1159   BP: 124/80   Weight: 102.1 kg (225 lb)   Height: 1.702 m (5' 7\")       Body mass index is 35.24 kg/m .    EXAM:  General: NAD   HEENT: normocephalic, PERRLA and EOMS intact  Mounth: Mucus membranes moist  Neck: Supple  Chest: Clear to auscultation bilaterally  CV: RRR  ABD: Soft nontender and nondistended, left inguinal hernia, reducible  EXT: Warm, pulses intact,   Neuro: Alert and oriented x3  Back: no CVA tenderness        Assessment/Plan: Pt with a left inguinal hernia. I discussed this at length with He.  I went over conservative management as well as surgical treatment of this.. I would plan on doing this via anopen  approach with possible use of mesh. I went over the small risks of surgery including but not limited to bleeding and infection, anesthesia, recurrence rates and nerve injury. I discussed the expected recovery time as well. Will get this scheduled. He will contact us to have this scheduled.      Vern Tobar MD ,MD Vern Tobar MD  General Surgery [phone]  Vascular Surgery [phone]          Again, thank you for allowing me to participate in the care of your patient.        Sincerely,        Vern Tobar MD  "

## 2024-10-08 ENCOUNTER — HOSPITAL ENCOUNTER (OUTPATIENT)
Facility: AMBULATORY SURGERY CENTER | Age: 59
End: 2024-10-08
Attending: SPECIALIST
Payer: COMMERCIAL

## 2024-10-08 PROBLEM — K40.90 LEFT INGUINAL HERNIA: Status: ACTIVE | Noted: 2024-10-04

## 2024-10-13 ENCOUNTER — HOSPITAL ENCOUNTER (OUTPATIENT)
Facility: HOSPITAL | Age: 59
Setting detail: OBSERVATION
Discharge: HOME | End: 2024-10-14
Attending: EMERGENCY MEDICINE | Admitting: HOSPITALIST
Payer: COMMERCIAL

## 2024-10-13 ENCOUNTER — APPOINTMENT (OUTPATIENT)
Dept: CT IMAGING | Facility: HOSPITAL | Age: 59
End: 2024-10-13
Payer: COMMERCIAL

## 2024-10-13 DIAGNOSIS — K40.90 LEFT INGUINAL HERNIA: Primary | ICD-10-CM

## 2024-10-13 LAB
ANION GAP SERPL CALCULATED.3IONS-SCNC: 10 MMOL/L (ref 7–15)
BUN SERPL-MCNC: 11.6 MG/DL (ref 8–23)
CALCIUM SERPL-MCNC: 9 MG/DL (ref 8.8–10.4)
CHLORIDE SERPL-SCNC: 106 MMOL/L (ref 98–107)
CREAT SERPL-MCNC: 0.98 MG/DL (ref 0.67–1.17)
EGFRCR SERPLBLD CKD-EPI 2021: 88 ML/MIN/1.73M2
ERYTHROCYTE [DISTWIDTH] IN BLOOD BY AUTOMATED COUNT: 14 % (ref 10–15)
GLUCOSE SERPL-MCNC: 107 MG/DL (ref 70–99)
HCO3 SERPL-SCNC: 24 MMOL/L (ref 22–29)
HCT VFR BLD AUTO: 33.4 % (ref 40–53)
HGB BLD-MCNC: 11.7 G/DL (ref 13.3–17.7)
LACTATE SERPL-SCNC: 1.1 MMOL/L (ref 0.7–2)
MCH RBC QN AUTO: 33.9 PG (ref 26.5–33)
MCHC RBC AUTO-ENTMCNC: 35 G/DL (ref 31.5–36.5)
MCV RBC AUTO: 97 FL (ref 78–100)
PLATELET # BLD AUTO: 100 10E3/UL (ref 150–450)
POTASSIUM SERPL-SCNC: 4.2 MMOL/L (ref 3.4–5.3)
RBC # BLD AUTO: 3.45 10E6/UL (ref 4.4–5.9)
SODIUM SERPL-SCNC: 140 MMOL/L (ref 135–145)
WBC # BLD AUTO: 4.3 10E3/UL (ref 4–11)

## 2024-10-13 PROCEDURE — G0378 HOSPITAL OBSERVATION PER HR: HCPCS

## 2024-10-13 PROCEDURE — 99215 OFFICE O/P EST HI 40 MIN: CPT | Performed by: PHYSICIAN ASSISTANT

## 2024-10-13 PROCEDURE — 36415 COLL VENOUS BLD VENIPUNCTURE: CPT

## 2024-10-13 PROCEDURE — 250N000011 HC RX IP 250 OP 636: Performed by: HOSPITALIST

## 2024-10-13 PROCEDURE — 99285 EMERGENCY DEPT VISIT HI MDM: CPT | Mod: 25

## 2024-10-13 PROCEDURE — 83605 ASSAY OF LACTIC ACID: CPT

## 2024-10-13 PROCEDURE — 96375 TX/PRO/DX INJ NEW DRUG ADDON: CPT

## 2024-10-13 PROCEDURE — 250N000011 HC RX IP 250 OP 636

## 2024-10-13 PROCEDURE — 250N000013 HC RX MED GY IP 250 OP 250 PS 637: Performed by: HOSPITALIST

## 2024-10-13 PROCEDURE — 96374 THER/PROPH/DIAG INJ IV PUSH: CPT | Mod: XU

## 2024-10-13 PROCEDURE — 74177 CT ABD & PELVIS W/CONTRAST: CPT

## 2024-10-13 PROCEDURE — 80048 BASIC METABOLIC PNL TOTAL CA: CPT

## 2024-10-13 PROCEDURE — 85027 COMPLETE CBC AUTOMATED: CPT

## 2024-10-13 PROCEDURE — 99222 1ST HOSP IP/OBS MODERATE 55: CPT | Performed by: HOSPITALIST

## 2024-10-13 RX ORDER — HYDRALAZINE HYDROCHLORIDE 20 MG/ML
10 INJECTION INTRAMUSCULAR; INTRAVENOUS EVERY 4 HOURS PRN
Status: DISCONTINUED | OUTPATIENT
Start: 2024-10-13 | End: 2024-10-14 | Stop reason: HOSPADM

## 2024-10-13 RX ORDER — VITAMIN B COMPLEX
1 TABLET ORAL DAILY
COMMUNITY

## 2024-10-13 RX ORDER — MAGNESIUM OXIDE 400 MG/1
400 TABLET ORAL DAILY
COMMUNITY

## 2024-10-13 RX ORDER — HYDROMORPHONE HCL IN WATER/PF 6 MG/30 ML
0.2 PATIENT CONTROLLED ANALGESIA SYRINGE INTRAVENOUS
Status: DISCONTINUED | OUTPATIENT
Start: 2024-10-13 | End: 2024-10-13

## 2024-10-13 RX ORDER — ALBUTEROL SULFATE 90 UG/1
2 INHALANT RESPIRATORY (INHALATION) EVERY 4 HOURS PRN
Status: DISCONTINUED | OUTPATIENT
Start: 2024-10-13 | End: 2024-10-14 | Stop reason: HOSPADM

## 2024-10-13 RX ORDER — ACETAMINOPHEN 325 MG/1
650 TABLET ORAL EVERY 4 HOURS PRN
Status: DISCONTINUED | OUTPATIENT
Start: 2024-10-13 | End: 2024-10-14 | Stop reason: HOSPADM

## 2024-10-13 RX ORDER — HYDROMORPHONE HYDROCHLORIDE 1 MG/ML
0.5 INJECTION, SOLUTION INTRAMUSCULAR; INTRAVENOUS; SUBCUTANEOUS ONCE
Status: DISCONTINUED | OUTPATIENT
Start: 2024-10-13 | End: 2024-10-13

## 2024-10-13 RX ORDER — OXYCODONE HYDROCHLORIDE 5 MG/1
10 TABLET ORAL EVERY 4 HOURS PRN
Status: CANCELLED | OUTPATIENT
Start: 2024-10-13

## 2024-10-13 RX ORDER — MORPHINE SULFATE 2 MG/ML
2 INJECTION, SOLUTION INTRAMUSCULAR; INTRAVENOUS EVERY 4 HOURS PRN
Status: DISCONTINUED | OUTPATIENT
Start: 2024-10-13 | End: 2024-10-14 | Stop reason: HOSPADM

## 2024-10-13 RX ORDER — PANTOPRAZOLE SODIUM 40 MG/1
40 TABLET, DELAYED RELEASE ORAL DAILY
Status: DISCONTINUED | OUTPATIENT
Start: 2024-10-14 | End: 2024-10-14 | Stop reason: HOSPADM

## 2024-10-13 RX ORDER — HYDROMORPHONE HCL IN WATER/PF 6 MG/30 ML
0.4 PATIENT CONTROLLED ANALGESIA SYRINGE INTRAVENOUS
Status: DISCONTINUED | OUTPATIENT
Start: 2024-10-13 | End: 2024-10-13

## 2024-10-13 RX ORDER — IOPAMIDOL 755 MG/ML
90 INJECTION, SOLUTION INTRAVASCULAR ONCE
Status: COMPLETED | OUTPATIENT
Start: 2024-10-13 | End: 2024-10-13

## 2024-10-13 RX ORDER — ACETAMINOPHEN 650 MG/1
650 SUPPOSITORY RECTAL EVERY 4 HOURS PRN
Status: DISCONTINUED | OUTPATIENT
Start: 2024-10-13 | End: 2024-10-14 | Stop reason: HOSPADM

## 2024-10-13 RX ORDER — ATORVASTATIN CALCIUM 10 MG/1
10 TABLET, FILM COATED ORAL DAILY
Status: DISCONTINUED | OUTPATIENT
Start: 2024-10-14 | End: 2024-10-14 | Stop reason: HOSPADM

## 2024-10-13 RX ORDER — OXYCODONE HYDROCHLORIDE 5 MG/1
5 TABLET ORAL EVERY 4 HOURS PRN
Status: DISCONTINUED | OUTPATIENT
Start: 2024-10-13 | End: 2024-10-13

## 2024-10-13 RX ORDER — LIDOCAINE 40 MG/G
CREAM TOPICAL
Status: DISCONTINUED | OUTPATIENT
Start: 2024-10-13 | End: 2024-10-14 | Stop reason: HOSPADM

## 2024-10-13 RX ORDER — TOLTERODINE 2 MG/1
4 CAPSULE, EXTENDED RELEASE ORAL DAILY
Status: DISCONTINUED | OUTPATIENT
Start: 2024-10-14 | End: 2024-10-14 | Stop reason: HOSPADM

## 2024-10-13 RX ORDER — CALCIUM POLYCARBOPHIL 625 MG
4 TABLET ORAL DAILY
COMMUNITY

## 2024-10-13 RX ORDER — POLYETHYLENE GLYCOL 3350 17 G/17G
17 POWDER, FOR SOLUTION ORAL 2 TIMES DAILY PRN
Status: DISCONTINUED | OUTPATIENT
Start: 2024-10-13 | End: 2024-10-14 | Stop reason: HOSPADM

## 2024-10-13 RX ORDER — OXYCODONE HYDROCHLORIDE 5 MG/1
10 TABLET ORAL EVERY 4 HOURS PRN
Status: DISCONTINUED | OUTPATIENT
Start: 2024-10-13 | End: 2024-10-14 | Stop reason: HOSPADM

## 2024-10-13 RX ORDER — NALOXONE HYDROCHLORIDE 0.4 MG/ML
0.4 INJECTION, SOLUTION INTRAMUSCULAR; INTRAVENOUS; SUBCUTANEOUS
Status: DISCONTINUED | OUTPATIENT
Start: 2024-10-13 | End: 2024-10-14 | Stop reason: HOSPADM

## 2024-10-13 RX ORDER — ACYCLOVIR 400 MG/1
400 TABLET ORAL EVERY 12 HOURS
Status: DISCONTINUED | OUTPATIENT
Start: 2024-10-13 | End: 2024-10-14 | Stop reason: HOSPADM

## 2024-10-13 RX ORDER — ZINC GLUCONATE 50 MG
50 TABLET ORAL DAILY
COMMUNITY

## 2024-10-13 RX ORDER — OXYCODONE HYDROCHLORIDE 5 MG/1
5 TABLET ORAL EVERY 4 HOURS PRN
Status: DISCONTINUED | OUTPATIENT
Start: 2024-10-13 | End: 2024-10-14 | Stop reason: HOSPADM

## 2024-10-13 RX ORDER — MEGESTROL ACETATE 20 MG/1
20 TABLET ORAL 2 TIMES DAILY
Status: DISCONTINUED | OUTPATIENT
Start: 2024-10-13 | End: 2024-10-14 | Stop reason: HOSPADM

## 2024-10-13 RX ORDER — MIRABEGRON 25 MG/1
25 TABLET, FILM COATED, EXTENDED RELEASE ORAL DAILY
Status: DISCONTINUED | OUTPATIENT
Start: 2024-10-14 | End: 2024-10-14 | Stop reason: HOSPADM

## 2024-10-13 RX ORDER — HYDRALAZINE HYDROCHLORIDE 10 MG/1
10 TABLET, FILM COATED ORAL EVERY 4 HOURS PRN
Status: DISCONTINUED | OUTPATIENT
Start: 2024-10-13 | End: 2024-10-14 | Stop reason: HOSPADM

## 2024-10-13 RX ORDER — AMOXICILLIN 250 MG
1 CAPSULE ORAL 2 TIMES DAILY PRN
Status: DISCONTINUED | OUTPATIENT
Start: 2024-10-13 | End: 2024-10-14 | Stop reason: HOSPADM

## 2024-10-13 RX ORDER — AMOXICILLIN 250 MG
2 CAPSULE ORAL 2 TIMES DAILY PRN
Status: DISCONTINUED | OUTPATIENT
Start: 2024-10-13 | End: 2024-10-14 | Stop reason: HOSPADM

## 2024-10-13 RX ORDER — SULFAMETHOXAZOLE AND TRIMETHOPRIM 800; 160 MG/1; MG/1
1 TABLET ORAL
Status: DISCONTINUED | OUTPATIENT
Start: 2024-10-14 | End: 2024-10-14 | Stop reason: HOSPADM

## 2024-10-13 RX ORDER — DIPHENHYDRAMINE HYDROCHLORIDE 50 MG/ML
25 INJECTION INTRAMUSCULAR; INTRAVENOUS ONCE
Status: COMPLETED | OUTPATIENT
Start: 2024-10-13 | End: 2024-10-13

## 2024-10-13 RX ORDER — HYDROXYZINE HYDROCHLORIDE 25 MG/1
25 TABLET, FILM COATED ORAL EVERY 6 HOURS PRN
Status: DISCONTINUED | OUTPATIENT
Start: 2024-10-13 | End: 2024-10-14 | Stop reason: HOSPADM

## 2024-10-13 RX ORDER — LORATADINE 10 MG/1
10 TABLET ORAL DAILY PRN
Status: DISCONTINUED | OUTPATIENT
Start: 2024-10-13 | End: 2024-10-14 | Stop reason: HOSPADM

## 2024-10-13 RX ORDER — BISACODYL 10 MG
10 SUPPOSITORY, RECTAL RECTAL DAILY PRN
Status: DISCONTINUED | OUTPATIENT
Start: 2024-10-13 | End: 2024-10-14 | Stop reason: HOSPADM

## 2024-10-13 RX ORDER — NALOXONE HYDROCHLORIDE 0.4 MG/ML
0.2 INJECTION, SOLUTION INTRAMUSCULAR; INTRAVENOUS; SUBCUTANEOUS
Status: DISCONTINUED | OUTPATIENT
Start: 2024-10-13 | End: 2024-10-14 | Stop reason: HOSPADM

## 2024-10-13 RX ADMIN — MORPHINE SULFATE 2 MG: 2 INJECTION, SOLUTION INTRAMUSCULAR; INTRAVENOUS at 19:03

## 2024-10-13 RX ADMIN — IOPAMIDOL 90 ML: 755 INJECTION, SOLUTION INTRAVENOUS at 15:18

## 2024-10-13 RX ADMIN — HYDROMORPHONE HYDROCHLORIDE 1 MG: 1 INJECTION, SOLUTION INTRAMUSCULAR; INTRAVENOUS; SUBCUTANEOUS at 12:36

## 2024-10-13 RX ADMIN — OXYCODONE HYDROCHLORIDE 10 MG: 5 TABLET ORAL at 21:10

## 2024-10-13 RX ADMIN — ACETAMINOPHEN 650 MG: 325 TABLET ORAL at 21:10

## 2024-10-13 RX ADMIN — DIPHENHYDRAMINE HYDROCHLORIDE 25 MG: 50 INJECTION, SOLUTION INTRAMUSCULAR; INTRAVENOUS at 16:20

## 2024-10-13 ASSESSMENT — ACTIVITIES OF DAILY LIVING (ADL)
ADLS_ACUITY_SCORE: 35
ADLS_ACUITY_SCORE: 33
ADLS_ACUITY_SCORE: 35

## 2024-10-13 NOTE — ED PROVIDER NOTES
"Emergency Department Midlevel Supervisory Note     I had a face to face encounter with this patient seen by the Advanced Practice Provider (NAYA). I personally made/approved the management plan and take responsibility for the patient management. I personally saw patient and performed a substantive portion of the visit including all aspects of the medical decision making.     ED Course:  12:41 PM  Sharron Alejandro PA-C  staffed patient with me. I agree with their assessment and plan of management, and I will see the patient.  1:00 PM Sharron spoke with the general surgery PA working with Dr. Latham.  1:01 PM  I met with the patient to introduce myself, gather additional history, perform my initial exam, and discuss the plan.   2:31 PM The patient is still in pain despite medication. We will plan to admit for pain medication.    Brief HPI:     Juvenal Blake is a 59 year old male who presents for evaluation of  left inguinal hernia.  Patient states that he was diagnosed with a hernia December 2023.  He states that this was found on his \"scan.  He did not have any problems with it until about a month ago when he started experiencing pain.  He was seen on October 4 by Dr. Tobar who recommended left inguinal hernia repair.  He states he has had multiple hernia repairs in the past on both sides.     Over the past 2 days he developed severe pain to the left side.  He feels he cannot push the hernia back in.  It is a hard lump.  The pain is so severe that he is having a difficult time sleeping.  He does have oxycodone which he has been taking for the pain and it only helps a little bit.     I, Chapincito Ross, am serving as a scribe to document services personally performed by Dr. Sharon Couch, based on my observations and the provider's statements to me. I, Dr. Sharon Couch, attest that Chapincito Ross is acting in a scribe capacity, has observed my performance of the services and has documented them in " "accordance with my direction.     Brief Physical Exam: BP (!) 145/76   Pulse 63   Temp 97.5  F (36.4  C) (Temporal)   Resp 18   Ht 1.702 m (5' 7\")   Wt 104.3 kg (230 lb)   SpO2 97%   BMI 36.02 kg/m    Constitutional:  Alert, in no acute distress  EYES: Conjunctivae clear  HENT:  Atraumatic  Respiratory:  Respirations even, unlabored, in no acute respiratory distress  Cardiovascular:  Regular rate and rhythm, good peripheral perfusion  GI: Soft, non-distended, left inguinal hernia, unable to reduce  Musculoskeletal:  Moves all 4 extremities equally, grossly symmetrical strength  Integument: Warm & dry. No appreciable rash, erythema.  Neurologic:  Alert & oriented, speech clear and fluent, no focal deficits noted  Psych: Normal mood and affect       MDM:  Patient presents with left lower quadrant pain.  Patient with known hernia that again is present and difficult to reduce.  Surgery was consulted and the PA for surgery did try to reduce it at the bedside after our attempts.  Patient continued to have pain.  This was relieved with IV Dilaudid but when this wore off, he continued to have pain.  Given it is an admission for pain management and surgery does not feel they will plan to take the patient to surgery for hernia repair, patient ultimately admitted to the hospitalist.  With discussion with the hospitalist he would like us to ensure that the surgeon does not want to directly admit the patient.       1. Left inguinal hernia        Consults:  I discussed the patient with surgery who recommends pain control and follow-up with surgery as an outpatient    Labs and Imaging:  Results for orders placed or performed during the hospital encounter of 10/13/24   CBC (+ platelets, no diff)   Result Value Ref Range    WBC Count 4.3 4.0 - 11.0 10e3/uL    RBC Count 3.45 (L) 4.40 - 5.90 10e6/uL    Hemoglobin 11.7 (L) 13.3 - 17.7 g/dL    Hematocrit 33.4 (L) 40.0 - 53.0 %    MCV 97 78 - 100 fL    MCH 33.9 (H) 26.5 - 33.0 pg "    MCHC 35.0 31.5 - 36.5 g/dL    RDW 14.0 10.0 - 15.0 %    Platelet Count 100 (L) 150 - 450 10e3/uL   Basic metabolic panel   Result Value Ref Range    Sodium 140 135 - 145 mmol/L    Potassium 4.2 3.4 - 5.3 mmol/L    Chloride 106 98 - 107 mmol/L    Carbon Dioxide (CO2) 24 22 - 29 mmol/L    Anion Gap 10 7 - 15 mmol/L    Urea Nitrogen 11.6 8.0 - 23.0 mg/dL    Creatinine 0.98 0.67 - 1.17 mg/dL    GFR Estimate 88 >60 mL/min/1.73m2    Calcium 9.0 8.8 - 10.4 mg/dL    Glucose 107 (H) 70 - 99 mg/dL   Lactic acid whole blood   Result Value Ref Range    Lactic Acid 1.1 0.7 - 2.0 mmol/L       I have reviewed the relevant laboratory studies above.    I independently interpreted the following imaging study(s):       Procedures:  I was present for the key portions of procedures documented in NAYA/midlevel note, see midlevel note for further details.    Sharon Couch MD  [facility] EMERGENCY DEPARTMENT  [address] [address]  [phone]       Sharon Couch MD  10/13/24 5812

## 2024-10-13 NOTE — ED NOTES
Pt is itching upper abd. He has a fine red rash noted on abd.  Dilaudid dose was given at 1230. Provider notified.

## 2024-10-13 NOTE — PHARMACY-ADMISSION MEDICATION HISTORY
Pharmacist Admission Medication History    Admission medication history is complete. The information provided in this note is only as accurate as the sources available at the time of the update.    Information Source(s): Patient, Family member, Clinic records, and CareEverywhere/SureScripts via in-person    Pertinent Information: Last IVIG was in August    Changes made to PTA medication list:  Added: vitamin D, magnesium, zinc  Deleted: cephalexsin, doxycyline, robitussin AC, metamucil powder  Changed: Proair, Fiber, loratadine, omeprazole, sildenafil    Allergies reviewed with patient and updates made in EHR: yes    Medication History Completed By: Gerardo Lujan [facility] 10/13/2024 4:37 PM    Current Facility-Administered Medications for the 10/13/24 encounter (Hospital Encounter)   Medication    heparin 100 unit/mL injection 300-600 Units    sodium chloride (PF) 0.9% PF flush 10-20 mL     PTA Med List   Medication Sig Last Dose    acyclovir (ZOVIRAX) 800 MG tablet Take 0.5 tablets (400 mg) by mouth every 12 hours. 10/13/2024 at am    albuterol (PROAIR HFA/PROVENTIL HFA/VENTOLIN HFA) 108 (90 Base) MCG/ACT inhaler Inhale 2 puffs into the lungs every 4 hours as needed for wheezing, cough or shortness of breath. Past Week    atorvastatin (LIPITOR) 10 MG tablet Take 10 mg by mouth daily. 10/13/2024 at am    beclomethasone HFA (QVAR REDIHALER) 80 MCG/ACT inhaler Inhale 1 puff into the lungs 2 times daily as needed (when sick). More than a month    benzonatate (TESSALON) 200 MG capsule Take 200 mg by mouth 3 times daily as needed for cough. Past Month    Calcium Polycarbophil (FIBER) 625 MG tablet Take 4 tablets by mouth daily. 10/13/2024 at am    fluticasone (FLONASE) 50 MCG/ACT nasal spray Spray 2 sprays into both nostrils daily as needed for rhinitis or allergies. More than a month    loratadine (CLARITIN) 10 MG tablet Take 10 mg by mouth daily. Past Week    magnesium oxide (MAG-OX) 400 MG tablet Take 400 mg by mouth  daily. 10/13/2024    medical cannabis (Patient's own supply) 1 Dose daily as needed (The purpose of this order is to document that the patient reports taking medical cannabis)     megestrol (MEGACE) 20 MG tablet Take 1 tablet by mouth 2 times daily 10/13/2024 at am    mirabegron (MYRBETRIQ) 25 MG 24 hr tablet Take 25 mg by mouth daily 10/13/2024 at am    multivitamin w/minerals (THERA-VIT-M) tablet Take 1 tablet by mouth daily 10/13/2024 at am    omeprazole (PRILOSEC) 20 MG DR capsule Take 20 mg by mouth daily. 10/13/2024 at am    polyethylene glycol (MIRALAX) 17 GM/Dose powder Take 17 g (1 Capful) by mouth 2 times daily for 5 days, THEN 17 g (1 Capful) daily as needed. Past Month    relugolix (ORGOVYX) 120 MG tablet Take 120 mg by mouth daily. 10/12/2024 at PM    sildenafil (REVATIO) 20 MG tablet Take 100 mg by mouth daily as needed. Past Week    sulfamethoxazole-trimethoprim (BACTRIM DS) 800-160 MG tablet TAKE 1 TABLET BY MOUTH EVERY MONDAY AND TUESDAY TWICE DAILY Past Week    tolterodine ER (DETROL LA) 4 MG 24 hr capsule Take 1 capsule (4 mg) by mouth daily 10/13/2024 at am    Vitamin D3 (VITAMIN D, CHOLECALCIFEROL,) 25 mcg (1000 units) tablet Take 1 tablet by mouth daily. 10/13/2024 at am    XTANDI 40 MG capsule Take 160 mg by mouth daily. 10/12/2024 at PM    zinc gluconate 50 MG tablet Take 50 mg by mouth daily. 10/13/2024 at am

## 2024-10-13 NOTE — ED NOTES
Pt ambulated per RN and PA instruction. Pt reports that he feels the pain is well managed while moving likely due to the pain medication he already received. He reports that he can feel the hernia moving around and wonders if it would hurt if he wasn't taking medication. RN updated

## 2024-10-13 NOTE — ED NOTES
"Pt reports L inguinal hernia with surgery scheduled for Nov 7. He has been unable to reduce the hernia \"Completely\" and \"the pain is unbearable\" . Has been taking oxycodone that he had left over from a procedure and has run out. Last dosed this morning.  "

## 2024-10-13 NOTE — CONSULTS
General Surgery Consultation  Juvenal Blake MRN# [id number]   Age/Sex: 59 year old male YOB: 1965     Reason for consult: Left inguinal hernia       Requesting physician: ED                   Assessment and Plan:   Assessment:  Left inguinal hernia, mostly reducible, nonobstructing    Plan:  Reviewed exam, lab, and imaging findings with them. Findings consistent with mostly reducible left inguinal hernia. It is not obstructing. He has a planned elected repair of this on 11/7. Given that the hernia is soft and mostly reducible and not causing any obstructive symptoms. Would recommend pain control and discharge with current plan of repair as outpatient.        Adi Yao PA-C  [facility]  Surgery [address]  [address]  [place] [address]?  Office: [phone]             Chief Complaint:     Chief Complaint   Patient presents with    Hernia        History is obtained from the patient    HPI:   Juvenal Blake is a 59 year old male who presents with known left inguinal hernia. Pain has been increased and not able to reduce as well at home over the last 1-2 days.           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)     Low serum IgG for age     Non Hodgkin's lymphoma (H)     Non Hodgkin's lymphoma (H)     Osteopenia     Prostate CA (H)     Shortness of breath     Sleep apnea     CPAP    Thrombocytopenia (H)               Past Surgical History:     Past Surgical History:   Procedure Laterality Date    ABDOMEN SURGERY      for pyloric stenosis as an infant    COLONOSCOPY      CYSTOSCOPY, BIOPSY BLADDER, COMBINED N/A 8/15/2024    Procedure: CYSTOSCOPY WITH BLADDER  BIOPSY AND BRASHER PLACEMENT;  Surgeon: Shai Issa MD;  Location: [facility] OR    CYSTOSCOPY, FULGURATE BLADDER TUMOR, COMBINED N/A 8/15/2024    Procedure: AND FULGURATION;  Surgeon: Shai Issa MD;  Location: [facility] OR    DISTAL CLAVICLE EXCISION      per H & P     HERNIA REPAIR      inguinal    HERNIA REPAIR, UMBILICAL      INSERT PICC LINE N/A 08/24/2021    Procedure: INSERTION, PICC EXCHANGE, PREVIOUS PICC IS OUT 10 CENTIMETERS;  Surgeon: Christiano Murdock MD;  [place] [facility] [place]    IR CHEST PORT PLACEMENT > 5 YRS OF AGE  02/17/2017    IR LYMPH NODE BIOPSY  10/01/2020    IR LYMPH NODE BIOPSY  05/28/2021    IR LYMPH NODE BIOPSY  12/20/2021    IR PICC PLACEMENT > 5 YRS OF AGE  08/24/2021    LAPAROSCOPIC HERNIORRHAPHY INCISIONAL Right 05/28/2020    Procedure: HERNIORRHAPHY, UMBILICAL, LAPAROSCOPIC; AXILLARY LYMPH NODE BIOPSY;  Surgeon: Rob Farrell MD;  Location: [facility];  Service: General    ORTHOPEDIC SURGERY      OTHER SURGICAL HISTORY Left     shoulder arthroscopy    OTHER SURGICAL HISTORY  01/01/2017    port a cath placement    PICC DOUBLE LUMEN PLACEMENT Right 06/27/2021    47cm (3cm external), Basilic vein    PICC DOUBLE LUMEN PLACEMENT Left 01/17/2022    46 cm 5 fr dl Bard PICC    [place] LAP,PROSTATECTOMY,RADICAL,W/NERVE SPARE,INCL ROBOTIC N/A 11/15/2017    Procedure: ROBOTIC ASSISTED RADICAL RETROPUBIC PROSTATECTOMY BILATERAL PELVIC LYMPH NODE DISSECTION ;  Surgeon: Ezio Steele MD;  [place] [facility]  Service: [place]rology    TONSILLECTOMY      US LYMPH NODE BIOPSY  01/07/2019             Social History:    reports that he quit smoking about 29 years ago. His smoking use included cigarettes. He has been exposed to tobacco smoke. He has never used smokeless tobacco. He reports current alcohol use. He reports current drug use. Drug: Marijuana.           Family History:     Family History   Problem Relation Age of Onset    Colon Cancer Mother          "diagnosed in her late 40s    Lymphoma Father         non-Hodgkins lymphoma, diagnosed 70s    No Known Problems Brother     No Known Problems Sister     No Known Problems Son     No Known Problems Sister     No Known Problems Son     No Known Problems Sister     No Known Problems Brother     No Known Problems Son     No Known Problems Sister     No Known Problems Son     Lung Cancer Maternal Grandmother     Lung Cancer Maternal Uncle               Allergies:     Allergies   Allergen Reactions    Rituxan [Rituximab] Shortness Of Breath     Wife states \"seizure-like symptoms\"    Levaquin [Levofloxacin]      Significant tendon pain    Ondansetron Itching              Medications:     Prior to Admission medications    Medication Sig Start Date End Date Taking? Authorizing Provider   acyclovir (ZOVIRAX) 800 MG tablet Take 0.5 tablets (400 mg) by mouth every 12 hours. 9/3/24   Rob Crooks MD   albuterol (PROAIR HFA/PROVENTIL HFA/VENTOLIN HFA) 108 (90 Base) MCG/ACT inhaler Inhale 2 puffs into the lungs every 6 hours    Reported, Patient   atorvastatin (LIPITOR) 10 MG tablet 1 tablet Orally Once a day for cholesterol for 90 days 5/30/24   Reported, Patient   beclomethasone HFA (QVAR REDIHALER) 80 MCG/ACT inhaler INHALE 1 PUFF BY MOUTH TWICE DAILY WHEN FOR SICK 6/28/22   Reported, Patient   benzonatate (TESSALON) 100 MG capsule  12/23/21   Reported, Patient   Calcium Polycarbophil (FIBER-CAPS PO) Take 2 capsules by mouth daily    Unknown, Entered By History   cephALEXin (KEFLEX) 500 MG capsule Take 1 capsule (500 mg) by mouth 3 times daily 8/15/24   Shai Issa MD   Doxycycline Hyclate 100 MG TBEC Take 1 tablet by mouth 2 times daily    Reported, Patient   fluticasone (FLONASE) 50 MCG/ACT nasal spray USE 2 SPRAYS PER NOSTRIL ONCE A DAY FOR NASAL CONGESTION. 14 DAYS 8/22/22   Reported, Patient   guaiFENesin-codeine (ROBITUSSIN AC) 100-10 MG/5ML solution TAKE 10 ML BY MOUTH EVERY 6 HOURS FOR 5 DAYS AS NEEDED    " "Reported, Patient   loratadine (CLARITIN) 10 MG tablet  9/6/22   Reported, Patient   medical cannabis (Patient's own supply) 1 Dose daily as needed (The purpose of this order is to document that the patient reports taking medical cannabis)    Unknown, Entered By History   megestrol (MEGACE) 20 MG tablet Take 1 tablet by mouth 2 times daily 4/15/24   Reported, Patient   mirabegron (MYRBETRIQ) 25 MG 24 hr tablet Take 25 mg by mouth daily    Reported, Patient   multivitamin w/minerals (THERA-VIT-M) tablet Take 1 tablet by mouth daily    Reported, Patient   omeprazole (PRILOSEC) 20 MG DR capsule TAKE 2 CAPSULES(40 MG) BY MOUTH DAILY 10/11/22   Madhavi Kurtz APRN CNP   polyethylene glycol (MIRALAX) 17 GM/Dose powder Take 17 g (1 Capful) by mouth 2 times daily for 5 days, THEN 17 g (1 Capful) daily as needed. 10/3/24   Odalis Sequeira PA-C   psyllium (METAMUCIL) 58.6 % packet Take 1 packet by mouth daily    Reported, Patient   relugolix (ORGOVYX) 120 MG tablet Take by mouth daily    Reported, Patient   sildenafil (REVATIO) 20 MG tablet Take 100 mg by mouth daily Monday, Wednesday and Friday    Reported, Patient   sulfamethoxazole-trimethoprim (BACTRIM DS) 800-160 MG tablet TAKE 1 TABLET BY MOUTH EVERY MONDAY AND TUESDAY TWICE DAILY 6/17/24   Madhavi Kurtz APRN CNP   tolterodine ER (DETROL LA) 4 MG 24 hr capsule Take 1 capsule (4 mg) by mouth daily 11/24/21   Luis Nation MD   XTANDI 40 MG capsule  3/14/24   Reported, Patient              Review of Systems:   The Review of Systems is negative other than noted in the HPI            Physical Exam:   Patient Vitals for the past 24 hrs:   BP Temp Temp src Pulse Resp SpO2 Height Weight   10/13/24 1243 (!) 148/76 -- -- 63 18 96 % -- --   10/13/24 1052 (!) 140/69 97.5  F (36.4  C) Temporal 68 18 99 % 1.702 m (5' 7\") 104.3 kg (230 lb)        No intake or output data in the 24 hours ending 10/13/24 1313   Constitutional:   awake, alert, cooperative, no " apparent distress, and appears stated age       Eyes:   PERRL, conjunctiva/corneas clear, EOM's intact; no scleral edema or icterus noted        ENT:   Normocephalic, without obvious abnormality, atraumatic, Lips, mucosa, and tongue normal        Hematologic / Lymphatic:   No lymphadenopathy       Lungs:   Normal respiratory effort, no accessory muscle use       Cardiovascular:   Regular rate and rhythm       Abdomen:   Left inguinal hernia present, soft, I was able to manually reduce the majority of it       Musculoskeletal:   No obvious swelling, bruising or deformity       Skin:   Skin color and texture normal for patient, no rashes or lesions              Data:        Admission on 10/13/2024   Component Date Value    WBC Count 10/13/2024 4.3     RBC Count 10/13/2024 3.45 (L)     Hemoglobin 10/13/2024 11.7 (L)     Hematocrit 10/13/2024 33.4 (L)     MCV 10/13/2024 97     MCH 10/13/2024 33.9 (H)     MCHC 10/13/2024 35.0     RDW 10/13/2024 14.0     Platelet Count 10/13/2024 100 (L)     Sodium 10/13/2024 140     Potassium 10/13/2024 4.2     Chloride 10/13/2024 106     Carbon Dioxide (CO2) 10/13/2024 24     Anion Gap 10/13/2024 10     Urea Nitrogen 10/13/2024 11.6     Creatinine 10/13/2024 0.98     GFR Estimate 10/13/2024 88     Calcium 10/13/2024 9.0     Glucose 10/13/2024 107 (H)     Lactic Acid 10/13/2024 1.1          All imaging studies reviewed by me.

## 2024-10-13 NOTE — H&P (VIEW-ONLY)
"Admission History and Physical   Juvenal Blake,  1965, MRN [id number]    [facility]    PCP: Abbey Maynard, [phone]          Extended Emergency Contact Information  Primary Emergency Contact: Nancy Blake  Address: [address] [place], [place] [id number] [place] [address]  Mobile Phone: [phone]  Relation: Spouse  Secondary Emergency Contact: Juvenal Blake   [place]  Mobile Phone: [phone]  Relation: Son       Assessment and Plan     59M with known L inguinal hernia who presents with uncontrolled pain at home.      #L inguinal hernia  -CT A/P pending.  -Pain control  -Management of hernia as per surgery.       #IgA deficiency, hypogammaglobulinemia  #Prostate CA s/p prostatectomy and LND on hormonal therapy  #hx NHL in remission  #JAZLYN s/p Inspire    Resume usual appropriate home medications for above medical issues.  Med rec pending    Checklist:  Code Status:  Full  Diet:  regular    Hahn Catheter: Not present  Lines:   PRESENT             DVT px:  Pneumatic Compression Devices        Auto-populated based on system request - if needs to be addressed in treatment plan they will be addressed above:  \"  Clinically Significant Risk Factors Present on Admission                   # Thrombocytopenia: Lowest platelets = 100 in last 2 days, will monitor for bleeding               # Obesity: Estimated body mass index is 36.02 kg/m  as calculated from the following:    Height as of this encounter: 1.702 m (5' 7\").    Weight as of this encounter: 104.3 kg (230 lb).                \"     Chief Complaint: Hernia pain       HPI:    Juvenal Blake is a 59 year old male IgA deficiency, hypogammaglobulinemia, Prostate CA s/p prostatectomy and LND on hormonal therapy, hx NHL in remission, JAZLYN s/p Inspire who presents with pain from L inguinal hernia.  Had been taking leftover oxycodone that he had at home from a prior surgery (1 pill dose unknown) for the pain.  Oxycodone " "was helpful but he ran out.  Pain increased to the point that it wasn't manageable at home and he presented for evaluation.  He wasn't able to reduce the hernia at home. No obstructive symptoms.    Has had SAMS with < 4 METS for 7-8 years which is stable.  No chest pains.  Coronary CTA 2021 with mild non-obstructive disease. Nuc stress 2020 without ischmemia.      Seen in ER 10/3 for hernia and then in clinic 10/4.  Surgery scheduled 11/7.  He is hoping for surgery sooner.    History is provided by patient       Physical Exam:  Temp:  [97.5  F (36.4  C)] 97.5  F (36.4  C)  Pulse:  [63-68] 63  Resp:  [18] 18  BP: (138-148)/(69-76) 145/76  SpO2:  [96 %-99 %] 97 %  BP (!) 145/76   Pulse 63   Temp 97.5  F (36.4  C) (Temporal)   Resp 18   Ht 1.702 m (5' 7\")   Wt 104.3 kg (230 lb)   SpO2 97%   BMI 36.02 kg/m       General:  Alert, cooperative, no distress,  Appears stated age  Neurologic:  oriented, facialsymmetry preserved, fluent speech. Moves all 4 spontaneously  Psych: calm, mood and affect appropriate to situation  HEENT:  Anicteric, MMM, unremarkable dentition  CV: RRR no MRG, normal S1 and S2, no edema  Lungs: CTAB.  Easyrespirations  Abd: soft, NT, normoactive BS.  L inguinal hernia firm.  Skin: no rashes noted on exposed skin.   Central Lines and Tubes: None (no moreira, CVC, feeding tubes)         Pertinent Test Findings  Radiology Results (results reviewed):   No results found for this visit on 10/13/24.        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)     Low serum IgG for age     Non Hodgkin's lymphoma (H)     Non Hodgkin's lymphoma (H)     Osteopenia     Prostate CA (H)     Shortness of breath     Sleep apnea     CPAP    " Thrombocytopenia (H)         Surgical History  Past Surgical History:   Procedure Laterality Date    ABDOMEN SURGERY      for pyloric stenosis as an infant    COLONOSCOPY      CYSTOSCOPY, BIOPSY BLADDER, COMBINED N/A 8/15/2024    Procedure: CYSTOSCOPY WITH BLADDER BIOPSY AND BRASHER PLACEMENT;  Surgeon: Shai Issa MD;  [facility]    CYSTOSCOPY, FULGURATE BLADDER TUMOR, COMBINED N/A 8/15/2024    Procedure: AND FULGURATION;  Surgeon: Shai Issa MD;  Location: [facility]    DISTAL CLAVICLE EXCISION      per H & P     HERNIA REPAIR      inguinal    HERNIA REPAIR, UMBILICAL      INSERT PICC LINE N/A 08/24/2021    Procedure: INSERTION, PICC EXCHANGE, PREVIOUS PICC IS OUT 10 CENTIMETERS;  Surgeon: Christiano Murdock MD;  [place] [facility] [place]    IR CHEST PORT PLACEMENT > 5 YRS OF AGE  02/17/2017    IR LYMPH NODE BIOPSY  10/01/2020    IR LYMPH NODE BIOPSY  05/28/2021    IR LYMPH NODE BIOPSY  12/20/2021    IR PICC PLACEMENT > 5 YRS OF AGE  08/24/2021    LAPAROSCOPIC HERNIORRHAPHY INCISIONAL Right 05/28/2020    Procedure: HERNIORRHAPHY, UMBILICAL, LAPAROSCOPIC; AXILLARY LYMPH NODE BIOPSY;  Surgeon: Rob Farrell MD;  Location: [facility];  Service: General    ORTHOPEDIC SURGERY      OTHER SURGICAL HISTORY Left     shoulder arthroscopy    OTHER SURGICAL HISTORY  01/01/2017    port a cath placement    PICC DOUBLE LUMEN PLACEMENT Right 06/27/2021    47cm (3cm external), Basilic vein    PICC DOUBLE LUMEN PLACEMENT Left 01/17/2022    46 cm 5 fr dl Bard PICC    [place] LAP,PROSTATECTOMY,RADICAL,W/NERVE SPARE,INCL ROBOTIC N/A 11/15/2017    Procedure: ROBOTIC ASSISTED RADICAL RETROPUBIC PROSTATECTOMY BILATERAL PELVIC LYMPH NODE DISSECTION ;  Surgeon: Ezio Steele MD;  Location: [facility];  Service: Urology    TONSILLECTOMY      US LYMPH NODE BIOPSY  01/07/2019          Social History  Social History     Tobacco Use    Smoking status: Former     Current packs/day: 0.00      "Types: Cigarettes     Quit date: 1995     Years since quittin.3     Passive exposure: Past    Smokeless tobacco: Never   Vaping Use    Vaping status: Never Used   Substance Use Topics    Alcohol use: Yes     Comment: 1-2 a day,occ more    Drug use: Yes     Types: Marijuana          Allergies  Allergies   Allergen Reactions    Rituxan [Rituximab] Shortness Of Breath     Wife states \"seizure-like symptoms\"    Levaquin [Levofloxacin]      Significant tendon pain    Ondansetron Itching    Family History  I have reviewed this patient's family history and updated it with pertinent information if needed.  Family History   Problem Relation Age of Onset    Colon Cancer Mother         diagnosed in her late 40s    Lymphoma Father         non-Hodgkins lymphoma, diagnosed 70s    No Known Problems Brother     No Known Problems Sister     No Known Problems Son     No Known Problems Sister     No Known Problems Son     No Known Problems Sister     No Known Problems Brother     No Known Problems Son     No Known Problems Sister     No Known Problems Son     Lung Cancer Maternal Grandmother     Lung Cancer Maternal Uncle                  Prior to Admission Medications   Prior to Admission Medications   Prescriptions Last Dose Informant Patient Reported? Taking?   Calcium Polycarbophil (FIBER-CAPS PO)   Yes No   Sig: Take 2 capsules by mouth daily   Doxycycline Hyclate 100 MG TBEC   Yes No   Sig: Take 1 tablet by mouth 2 times daily   XTANDI 40 MG capsule   Yes No   acyclovir (ZOVIRAX) 800 MG tablet   No No   Sig: Take 0.5 tablets (400 mg) by mouth every 12 hours.   albuterol (PROAIR HFA/PROVENTIL HFA/VENTOLIN HFA) 108 (90 Base) MCG/ACT inhaler   Yes No   Sig: Inhale 2 puffs into the lungs every 6 hours   atorvastatin (LIPITOR) 10 MG tablet   Yes No   Si tablet Orally Once a day for cholesterol for 90 days   beclomethasone HFA (QVAR REDIHALER) 80 MCG/ACT inhaler   Yes No   Sig: INHALE 1 PUFF BY MOUTH TWICE DAILY WHEN FOR " SICK   benzonatate (TESSALON) 100 MG capsule   Yes No   cephALEXin (KEFLEX) 500 MG capsule   No No   Sig: Take 1 capsule (500 mg) by mouth 3 times daily   fluticasone (FLONASE) 50 MCG/ACT nasal spray   Yes No   Sig: USE 2 SPRAYS PER NOSTRIL ONCE A DAY FOR NASAL CONGESTION. 14 DAYS   guaiFENesin-codeine (ROBITUSSIN AC) 100-10 MG/5ML solution   Yes No   Sig: TAKE 10 ML BY MOUTH EVERY 6 HOURS FOR 5 DAYS AS NEEDED   loratadine (CLARITIN) 10 MG tablet   Yes No   medical cannabis (Patient's own supply)   Yes No   Si Dose daily as needed (The purpose of this order is to document that the patient reports taking medical cannabis)   megestrol (MEGACE) 20 MG tablet   Yes No   Sig: Take 1 tablet by mouth 2 times daily   mirabegron (MYRBETRIQ) 25 MG 24 hr tablet   Yes No   Sig: Take 25 mg by mouth daily   multivitamin w/minerals (THERA-VIT-M) tablet   Yes No   Sig: Take 1 tablet by mouth daily   omeprazole (PRILOSEC) 20 MG DR capsule   No No   Sig: TAKE 2 CAPSULES(40 MG) BY MOUTH DAILY   polyethylene glycol (MIRALAX) 17 GM/Dose powder   No No   Sig: Take 17 g (1 Capful) by mouth 2 times daily for 5 days, THEN 17 g (1 Capful) daily as needed.   psyllium (METAMUCIL) 58.6 % packet   Yes No   Sig: Take 1 packet by mouth daily   relugolix (ORGOVYX) 120 MG tablet   Yes No   Sig: Take by mouth daily   sildenafil (REVATIO) 20 MG tablet   Yes No   Sig: Take 100 mg by mouth daily Monday, Wednesday and Friday   sulfamethoxazole-trimethoprim (BACTRIM DS) 800-160 MG tablet   No No   Sig: TAKE 1 TABLET BY MOUTH EVERY MONDAY AND TUESDAY TWICE DAILY   tolterodine ER (DETROL LA) 4 MG 24 hr capsule   No No   Sig: Take 1 capsule (4 mg) by mouth daily      Facility-Administered Medications Last Administration Doses Remaining   heparin 100 unit/mL injection 300-600 Units None recorded 1   sodium chloride (PF) 0.9% PF flush 10-20 mL None recorded 2                 Pertinent Labs    Most Recent 3 CBC's:  Recent Labs   Lab Test 10/13/24  1235  10/03/24  0841 07/05/24  1328   WBC 4.3 5.1 4.5   HGB 11.7* 13.5 12.0*   MCV 97 96 97   * 132* 111*     Most Recent 3 BMP's:  Recent Labs   Lab Test 10/13/24  1235 10/03/24  0841 08/01/24  1507    137 136   POTASSIUM 4.2 4.0 4.2   CHLORIDE 106 103 103   CO2 24 23 25   BUN 11.6 12.2 14.1   CR 0.98 0.99 1.11   ANIONGAP 10 11 8   TRE 9.0 9.5 9.1   * 154* 100*     Most Recent 2 LFT's:  Recent Labs   Lab Test 07/05/24  1328 05/07/24  0950   AST 26 16   ALT 24 12   ALKPHOS 85 68   BILITOTAL 0.6 0.3     Most Recent 3 INR's:  Recent Labs   Lab Test 02/17/22  1040 02/07/22  0957 01/31/22  0917   INR 0.96 0.95 1.00     Most Recent 3 Troponin's:  Recent Labs   Lab Test 06/25/21  2150 06/25/21  1441 06/25/21  0816   TROPI 0.120* 0.157* 0.188*       Medical Decision Making        Medical Decision Making               Sonal Tran MD, [facility]  Internal Medicine Hospitalist  10/13/2024

## 2024-10-13 NOTE — ED PROVIDER NOTES
Emergency Department Encounter   NAME: Juvenal Blake ; AGE: 59 year old male ; YOB: 1965 ; MRN: [id number] ; PCP: Abbey Maynard   ED PROVIDER: Sharron Alejandro PA-C    Evaluation Date & Time:   10/13/2024 10:56 AM    CHIEF COMPLAINT:  Hernia      Impression and Plan   MDM: Juvenal Blake is a 59 year old male who presents to the ED for evaluation of left inguinal hernia pain. Vitals unremarkable. Physical exam reveals tenderness to the hernia with firmness and difficult to reduce.     History:  Supplemental history from: none   External Record(s) reviewed: visit to ED 10/3, visit to general surgery clinic     Work Up:  Emergent/Severe conditions considered and evaluated for:   Differential diagnosis includes incarcerated hernia, strangulated hernia, bowel obstruction secondary to hernia, uncomplicated hernia, ischemic complications   I independently reviewed and interpreted   Labs   In additional to work up documented, I considered the following work up:   None   Medications given that require intensive monitoring for toxicity:   dilaudid    External consultation:  Discussion of management with another provider: Dr. Gaviria (ED MD), Dr. Tran (Hospitalist MD), Dr. Latham (General Surgeon DO)     Complicating factors:  Care impacted by chronic illness: None  Care affected by social determinants of health: Access to surgical care    Disposition considerations:   Prescriptions considered/prescribed:  Patient is very uncomfortable on initial presentation. Dilaudid is given for pain and surgery is consulted. General Surgery PA is able to reduce the hernia some on exam but not entirely. Dr. Latham states pain without complication is not an indication for emergent surgery.   Patient ambulates throughout department following dilaudid and has some return of pain. At this point, I discussed with patient admission for pain control vs going home. Patient elects to be admitted for pain control. I think  this is reasonable as patient has failed outpatient pain control with oxycodone and this is his 2nd visit due to intractable pain.   Discussed this with Dr. Latham from general surgery again who requests CT Abdomen Pelvis to evaluate for any further complication. Results pending when patient is admitted.   Admitted patient to hospitalist service under Dr. Tran. There was some back and forth whether to admit under hospitalist vs general surgery although surgery team does not foresee any surgical intervention during patient's hospital stay and thus patient will be admitted under the hospitalist.     Unfortunately, patient developed an urticarial rash on his abdomen following administration of Dilaudid.  He was given Benadryl. He had no oropharyngeal swelling to suggest airway compromise and was talking appropriately. Patient re-examined 20 min later and rash appears to be decreasing.   I added Dilaudid to his allergy list.  We discussed that for further pain management will have to use other pain medications.    Patient is admitted to hospitalist for further care.    Not Applicable    ED COURSE:  11:56 AM I met and introduced myself to the patient. I gathered initial history and performed my physical exam. We discussed plan for initial workup.   12:13 PM  I have staffed the patient with Dr. Couch, ED MD, who has evaluated the patient and agrees with all aspects of today's care. Basic labs ordered. Overall unremarkable. Lactic Acid normal so less suspicion for acute ischemic gut.   2:41 PM Dr. Hudson requests CT Abdomen Pelvis   3:32 PM rash on belly, given benadryl   At the conclusion of the encounter I discussed the results of all the tests and the disposition. The questions were answered. The patient or family acknowledged understanding and was agreeable with the care plan.  4:05 PM patient will be admitted for hospitalist care     FINAL IMPRESSION:    ICD-10-CM    1. Left inguinal hernia  K40.90      "        MEDICATIONS GIVEN IN THE EMERGENCY DEPARTMENT:  Medications   HYDROmorphone (DILAUDID) injection 1 mg (1 mg Intravenous $Given 10/13/24 1236)   diphenhydrAMINE (BENADRYL) injection 25 mg (25 mg Intravenous $Given 10/13/24 1620)   iopamidol (ISOVUE-370) solution 90 mL (90 mLs Intravenous $Given 10/13/24 1518)         NEW PRESCRIPTIONS STARTED AT TODAY'S ED VISIT:  New Prescriptions    No medications on file         HPI   Use of Intrepreter: N/A     Juvenal Blake is a 59 year old male with a pertinent history of recent hernia diagnosis who presents to the ED by self for evaluation of left inguinal hernia.  Patient states that he was diagnosed with a hernia December 2023.  He states that this was found on his \"scan.\"  He did not have any problems with it until about a month ago when he started experiencing pain.  He was seen on October 4 by Dr. Tobar who recommended left inguinal hernia repair.  He states he has had multiple hernia repairs in the past on both sides.    Over the past 2 days he developed severe pain to the left side.  He feels he cannot push the hernia back in.  It is a hard lump.  The pain is so severe that he is having a difficult time sleeping.  He does have oxycodone which he has been taking for the pain and it only helps a little bit.     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)     Low serum IgG for age     Non Hodgkin's lymphoma (H)     Non Hodgkin's lymphoma (H)     Osteopenia     Prostate CA (H)     Shortness of breath     Sleep apnea     Thrombocytopenia (H)        Past Surgical History:   Procedure Laterality Date    ABDOMEN SURGERY      for pyloric stenosis as an infant    COLONOSCOPY      CYSTOSCOPY, BIOPSY BLADDER, COMBINED N/A 8/15/2024    Procedure: CYSTOSCOPY WITH " BLADDER BIOPSY AND BRASHER PLACEMENT;  Surgeon: Shai Issa MD;  [place] [facility] [place]STOSCOPY, FULGURATE BLADDER TUMOR, COMBINED N/A 8/15/2024    Procedure: AND FULGURATION;  Surgeon: Shai Issa MD;  [place] [facility] [place]AL CLAVICLE EXCISION      per H & P     HERNIA REPAIR      inguinal    HERNIA REPAIR, UMBILICAL      INSERT PICC LINE N/A 08/24/2021    Procedure: INSERTION, PICC EXCHANGE, PREVIOUS PICC IS OUT 10 CENTIMETERS;  Surgeon: Christiano Murdock MD;  [place] [facility] [place]    IR CHEST PORT PLACEMENT > 5 YRS OF AGE  02/17/2017    IR LYMPH NODE BIOPSY  10/01/2020    IR LYMPH NODE BIOPSY  05/28/2021    IR LYMPH NODE BIOPSY  12/20/2021    IR PICC PLACEMENT > 5 YRS OF AGE  08/24/2021    LAPAROSCOPIC HERNIORRHAPHY INCISIONAL Right 05/28/2020    Procedure: HERNIORRHAPHY, UMBILICAL, LAPAROSCOPIC; AXILLARY LYMPH NODE BIOPSY;  Surgeon: Rob Farrell MD;  Location: [facility];  Service: General    ORTHOPEDIC SURGERY      OTHER SURGICAL HISTORY Left     shoulder arthroscopy    OTHER SURGICAL HISTORY  01/01/2017    port a cath placement    PICC DOUBLE LUMEN PLACEMENT Right 06/27/2021    47cm (3cm external), Basilic vein    PICC DOUBLE LUMEN PLACEMENT Left 01/17/2022    46 cm 5 fr dl Bard PICC    MI LAP,PROSTATECTOMY,RADICAL,W/NERVE SPARE,INCL ROBOTIC N/A 11/15/2017    Procedure: ROBOTIC ASSISTED RADICAL RETROPUBIC PROSTATECTOMY BILATERAL PELVIC LYMPH NODE DISSECTION ;  Surgeon: Ezio Steele MD;  [place] [facility] [place]    TONSILLECTOMY      US LYMPH NODE BIOPSY  01/07/2019       Family History   Problem Relation Age of Onset    Colon Cancer Mother         diagnosed in her late 40s    Lymphoma Father         non-Hodgkins lymphoma, diagnosed 70s    No Known Problems Brother     No Known Problems Sister     No Known Problems Son     No Known Problems Sister     No Known Problems Son     No Known Problems Sister     No Known Problems Brother  "    No Known Problems Son     No Known Problems Sister     No Known Problems Son     Lung Cancer Maternal Grandmother     Lung Cancer Maternal Uncle        Social History     Tobacco Use    Smoking status: Former     Current packs/day: 0.00     Types: Cigarettes     Quit date: 1995     Years since quittin.3     Passive exposure: Past    Smokeless tobacco: Never   Vaping Use    Vaping status: Never Used   Substance Use Topics    Alcohol use: Yes     Comment: 1-2 a day,occ more    Drug use: Yes     Types: Marijuana       acyclovir (ZOVIRAX) 800 MG tablet  albuterol (PROAIR HFA/PROVENTIL HFA/VENTOLIN HFA) 108 (90 Base) MCG/ACT inhaler  atorvastatin (LIPITOR) 10 MG tablet  beclomethasone HFA (QVAR REDIHALER) 80 MCG/ACT inhaler  benzonatate (TESSALON) 100 MG capsule  Calcium Polycarbophil (FIBER-CAPS PO)  cephALEXin (KEFLEX) 500 MG capsule  Doxycycline Hyclate 100 MG TBEC  fluticasone (FLONASE) 50 MCG/ACT nasal spray  guaiFENesin-codeine (ROBITUSSIN AC) 100-10 MG/5ML solution  loratadine (CLARITIN) 10 MG tablet  medical cannabis (Patient's own supply)  megestrol (MEGACE) 20 MG tablet  mirabegron (MYRBETRIQ) 25 MG 24 hr tablet  multivitamin w/minerals (THERA-VIT-M) tablet  omeprazole (PRILOSEC) 20 MG DR capsule  polyethylene glycol (MIRALAX) 17 GM/Dose powder  psyllium (METAMUCIL) 58.6 % packet  relugolix (ORGOVYX) 120 MG tablet  sildenafil (REVATIO) 20 MG tablet  sulfamethoxazole-trimethoprim (BACTRIM DS) 800-160 MG tablet  tolterodine ER (DETROL LA) 4 MG 24 hr capsule  XTANDI 40 MG capsule          Physical Exam     First Vitals:  Patient Vitals for the past 24 hrs:   BP Temp Temp src Pulse Resp SpO2 Height Weight   10/13/24 1500 (!) 145/76 -- -- 63 -- 97 % -- --   10/13/24 1315 138/76 -- -- -- -- -- -- --   10/13/24 1243 (!) 148/76 -- -- 63 18 96 % -- --   10/13/24 1052 (!) 140/69 97.5  F (36.4  C) Temporal 68 18 99 % 1.702 m (5' 7\") 104.3 kg (230 lb)         PHYSICAL EXAM:   Physical Exam    Constitutional: " alert,  no acute distress, appears uncomfortable   Head: normocephalic, atraumatic  Eyes:  EOM intact  Neck: ROM normal, no cervical adenopathy, no tenderness  Cardio: regular rate, regular rhythm, no murmurs   Pulmonary: effort normal, breath sounds normal, no wheezing or rales   Abdominal: flat, soft  -  inguinal hernia noted to the left groin, tender to palpation, firm, not able to reduce   Neuro: no focal deficit, at baseline  Psychiatric: mood and behavior within normal limit      Results     LAB:  All pertinent labs reviewed and interpreted  Labs Ordered and Resulted from Time of ED Arrival to Time of ED Departure   CBC WITH PLATELETS - Abnormal       Result Value    WBC Count 4.3      RBC Count 3.45 (*)     Hemoglobin 11.7 (*)     Hematocrit 33.4 (*)     MCV 97      MCH 33.9 (*)     MCHC 35.0      RDW 14.0      Platelet Count 100 (*)    BASIC METABOLIC PANEL - Abnormal    Sodium 140      Potassium 4.2      Chloride 106      Carbon Dioxide (CO2) 24      Anion Gap 10      Urea Nitrogen 11.6      Creatinine 0.98      GFR Estimate 88      Calcium 9.0      Glucose 107 (*)    LACTIC ACID WHOLE BLOOD - Normal    Lactic Acid 1.1         RADIOLOGY:  CT Abdomen Pelvis w Contrast    (Results Pending)       ECG:  None     PROCEDURES:  None     Sharron Alejandro PA-C   Emergency Medicine   [facility] EMERGENCY DEPARTMENT       Sharron Alejandro PA-C  10/13/24 1502

## 2024-10-13 NOTE — H&P
"Admission History and Physical   Juvenal Blake,  1965, MRN [id number]    [facility]    PCP: Abbey Maynard, [phone]          Extended Emergency Contact Information  Primary Emergency Contact: Nancy Blake  Address: [address] [place], [place] [id number] [place] [address]  Mobile Phone: [phone]  Relation: Spouse  Secondary Emergency Contact: Juvenal Blake   [place]  Mobile Phone: [phone]  Relation: Son       Assessment and Plan     59M with known L inguinal hernia who presents with uncontrolled pain at home.      #L inguinal hernia  -CT A/P pending.  -Pain control  -Management of hernia as per surgery.       #IgA deficiency, hypogammaglobulinemia  #Prostate CA s/p prostatectomy and LND on hormonal therapy  #hx NHL in remission  #JAZLYN s/p Inspire    Resume usual appropriate home medications for above medical issues.  Med rec pending    Checklist:  Code Status:  Full  Diet:  regular    Hahn Catheter: Not present  Lines:   PRESENT             DVT px:  Pneumatic Compression Devices        Auto-populated based on system request - if needs to be addressed in treatment plan they will be addressed above:  \"  Clinically Significant Risk Factors Present on Admission                   # Thrombocytopenia: Lowest platelets = 100 in last 2 days, will monitor for bleeding               # Obesity: Estimated body mass index is 36.02 kg/m  as calculated from the following:    Height as of this encounter: 1.702 m (5' 7\").    Weight as of this encounter: 104.3 kg (230 lb).                \"     Chief Complaint: Hernia pain       HPI:    Juvenal Blake is a 59 year old male IgA deficiency, hypogammaglobulinemia, Prostate CA s/p prostatectomy and LND on hormonal therapy, hx NHL in remission, JAZLYN s/p Inspire who presents with pain from L inguinal hernia.  Had been taking leftover oxycodone that he had at home from a prior surgery (1 pill dose unknown) for the pain.  Oxycodone " "was helpful but he ran out.  Pain increased to the point that it wasn't manageable at home and he presented for evaluation.  He wasn't able to reduce the hernia at home. No obstructive symptoms.    Has had SAMS with < 4 METS for 7-8 years which is stable.  No chest pains.  Coronary CTA 2021 with mild non-obstructive disease. Nuc stress 2020 without ischmemia.      Seen in ER 10/3 for hernia and then in clinic 10/4.  Surgery scheduled 11/7.  He is hoping for surgery sooner.    History is provided by patient       Physical Exam:  Temp:  [97.5  F (36.4  C)] 97.5  F (36.4  C)  Pulse:  [63-68] 63  Resp:  [18] 18  BP: (138-148)/(69-76) 145/76  SpO2:  [96 %-99 %] 97 %  BP (!) 145/76   Pulse 63   Temp 97.5  F (36.4  C) (Temporal)   Resp 18   Ht 1.702 m (5' 7\")   Wt 104.3 kg (230 lb)   SpO2 97%   BMI 36.02 kg/m       General:  Alert, cooperative, no distress,  Appears stated age  Neurologic:  oriented, facialsymmetry preserved, fluent speech. Moves all 4 spontaneously  Psych: calm, mood and affect appropriate to situation  HEENT:  Anicteric, MMM, unremarkable dentition  CV: RRR no MRG, normal S1 and S2, no edema  Lungs: CTAB.  Easyrespirations  Abd: soft, NT, normoactive BS.  L inguinal hernia firm.  Skin: no rashes noted on exposed skin.   Central Lines and Tubes: None (no moreira, CVC, feeding tubes)         Pertinent Test Findings  Radiology Results (results reviewed):   No results found for this visit on 10/13/24.        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)     Low serum IgG for age     Non Hodgkin's lymphoma (H)     Non Hodgkin's lymphoma (H)     Osteopenia     Prostate CA (H)     Shortness of breath     Sleep apnea     CPAP    " Thrombocytopenia (H)         Surgical History  Past Surgical History:   Procedure Laterality Date    ABDOMEN SURGERY      for pyloric stenosis as an infant    COLONOSCOPY      CYSTOSCOPY, BIOPSY BLADDER, COMBINED N/A 8/15/2024    Procedure: CYSTOSCOPY WITH BLADDER BIOPSY AND BRASHER PLACEMENT;  Surgeon: Shai Issa MD;  [place] [facility] [place]    CYSTOSCOPY, FULGURATE BLADDER TUMOR, COMBINED N/A 8/15/2024    Procedure: AND FULGURATION;  Surgeon: Shai Issa MD;  [place] [facility] [place]    DISTAL CLAVICLE EXCISION      per H & P     HERNIA REPAIR      inguinal    HERNIA REPAIR, UMBILICAL      INSERT PICC LINE N/A 08/24/2021    Procedure: INSERTION, PICC EXCHANGE, PREVIOUS PICC IS OUT 10 CENTIMETERS;  Surgeon: Christiano Murdock MD;  [place] [facility] [place]    IR CHEST PORT PLACEMENT > 5 YRS OF AGE  02/17/2017    IR LYMPH NODE BIOPSY  10/01/2020    IR LYMPH NODE BIOPSY  05/28/2021    IR LYMPH NODE BIOPSY  12/20/2021    IR PICC PLACEMENT > 5 YRS OF AGE  08/24/2021    LAPAROSCOPIC HERNIORRHAPHY INCISIONAL Right 05/28/2020    Procedure: HERNIORRHAPHY, UMBILICAL, LAPAROSCOPIC; AXILLARY LYMPH NODE BIOPSY;  Surgeon: Rob Farrell MD;  Location: [facility];  Service: General    ORTHOPEDIC SURGERY      OTHER SURGICAL HISTORY Left     shoulder arthroscopy    OTHER SURGICAL HISTORY  01/01/2017    port a cath placement    PICC DOUBLE LUMEN PLACEMENT Right 06/27/2021    47cm (3cm external), Basilic vein    PICC DOUBLE LUMEN PLACEMENT Left 01/17/2022    46 cm 5 fr dl Bard PICC    MI LAP,PROSTATECTOMY,RADICAL,W/NERVE SPARE,INCL ROBOTIC N/A 11/15/2017    Procedure: ROBOTIC ASSISTED RADICAL RETROPUBIC PROSTATECTOMY BILATERAL PELVIC LYMPH NODE DISSECTION ;  Surgeon: Ezio Steele MD;  Location: [facility];  Service: Urology    TONSILLECTOMY      US LYMPH NODE BIOPSY  01/07/2019          Social History  Social History     Tobacco Use    Smoking status: Former     Current packs/day: 0.00      "Types: Cigarettes     Quit date: 1995     Years since quittin.3     Passive exposure: Past    Smokeless tobacco: Never   Vaping Use    Vaping status: Never Used   Substance Use Topics    Alcohol use: Yes     Comment: 1-2 a day,occ more    Drug use: Yes     Types: Marijuana          Allergies  Allergies   Allergen Reactions    Rituxan [Rituximab] Shortness Of Breath     Wife states \"seizure-like symptoms\"    Levaquin [Levofloxacin]      Significant tendon pain    Ondansetron Itching    Family History  I have reviewed this patient's family history and updated it with pertinent information if needed.  Family History   Problem Relation Age of Onset    Colon Cancer Mother         diagnosed in her late 40s    Lymphoma Father         non-Hodgkins lymphoma, diagnosed 70s    No Known Problems Brother     No Known Problems Sister     No Known Problems Son     No Known Problems Sister     No Known Problems Son     No Known Problems Sister     No Known Problems Brother     No Known Problems Son     No Known Problems Sister     No Known Problems Son     Lung Cancer Maternal Grandmother     Lung Cancer Maternal Uncle                  Prior to Admission Medications   Prior to Admission Medications   Prescriptions Last Dose Informant Patient Reported? Taking?   Calcium Polycarbophil (FIBER-CAPS PO)   Yes No   Sig: Take 2 capsules by mouth daily   Doxycycline Hyclate 100 MG TBEC   Yes No   Sig: Take 1 tablet by mouth 2 times daily   XTANDI 40 MG capsule   Yes No   acyclovir (ZOVIRAX) 800 MG tablet   No No   Sig: Take 0.5 tablets (400 mg) by mouth every 12 hours.   albuterol (PROAIR HFA/PROVENTIL HFA/VENTOLIN HFA) 108 (90 Base) MCG/ACT inhaler   Yes No   Sig: Inhale 2 puffs into the lungs every 6 hours   atorvastatin (LIPITOR) 10 MG tablet   Yes No   Si tablet Orally Once a day for cholesterol for 90 days   beclomethasone HFA (QVAR REDIHALER) 80 MCG/ACT inhaler   Yes No   Sig: INHALE 1 PUFF BY MOUTH TWICE DAILY WHEN FOR " SICK   benzonatate (TESSALON) 100 MG capsule   Yes No   cephALEXin (KEFLEX) 500 MG capsule   No No   Sig: Take 1 capsule (500 mg) by mouth 3 times daily   fluticasone (FLONASE) 50 MCG/ACT nasal spray   Yes No   Sig: USE 2 SPRAYS PER NOSTRIL ONCE A DAY FOR NASAL CONGESTION. 14 DAYS   guaiFENesin-codeine (ROBITUSSIN AC) 100-10 MG/5ML solution   Yes No   Sig: TAKE 10 ML BY MOUTH EVERY 6 HOURS FOR 5 DAYS AS NEEDED   loratadine (CLARITIN) 10 MG tablet   Yes No   medical cannabis (Patient's own supply)   Yes No   Si Dose daily as needed (The purpose of this order is to document that the patient reports taking medical cannabis)   megestrol (MEGACE) 20 MG tablet   Yes No   Sig: Take 1 tablet by mouth 2 times daily   mirabegron (MYRBETRIQ) 25 MG 24 hr tablet   Yes No   Sig: Take 25 mg by mouth daily   multivitamin w/minerals (THERA-VIT-M) tablet   Yes No   Sig: Take 1 tablet by mouth daily   omeprazole (PRILOSEC) 20 MG DR capsule   No No   Sig: TAKE 2 CAPSULES(40 MG) BY MOUTH DAILY   polyethylene glycol (MIRALAX) 17 GM/Dose powder   No No   Sig: Take 17 g (1 Capful) by mouth 2 times daily for 5 days, THEN 17 g (1 Capful) daily as needed.   psyllium (METAMUCIL) 58.6 % packet   Yes No   Sig: Take 1 packet by mouth daily   relugolix (ORGOVYX) 120 MG tablet   Yes No   Sig: Take by mouth daily   sildenafil (REVATIO) 20 MG tablet   Yes No   Sig: Take 100 mg by mouth daily Monday, Wednesday and Friday   sulfamethoxazole-trimethoprim (BACTRIM DS) 800-160 MG tablet   No No   Sig: TAKE 1 TABLET BY MOUTH EVERY MONDAY AND TUESDAY TWICE DAILY   tolterodine ER (DETROL LA) 4 MG 24 hr capsule   No No   Sig: Take 1 capsule (4 mg) by mouth daily      Facility-Administered Medications Last Administration Doses Remaining   heparin 100 unit/mL injection 300-600 Units None recorded 1   sodium chloride (PF) 0.9% PF flush 10-20 mL None recorded 2                 Pertinent Labs    Most Recent 3 CBC's:  Recent Labs   Lab Test 10/13/24  1235  10/03/24  0841 07/05/24  1328   WBC 4.3 5.1 4.5   HGB 11.7* 13.5 12.0*   MCV 97 96 97   * 132* 111*     Most Recent 3 BMP's:  Recent Labs   Lab Test 10/13/24  1235 10/03/24  0841 08/01/24  1507    137 136   POTASSIUM 4.2 4.0 4.2   CHLORIDE 106 103 103   CO2 24 23 25   BUN 11.6 12.2 14.1   CR 0.98 0.99 1.11   ANIONGAP 10 11 8   TRE 9.0 9.5 9.1   * 154* 100*     Most Recent 2 LFT's:  Recent Labs   Lab Test 07/05/24  1328 05/07/24  0950   AST 26 16   ALT 24 12   ALKPHOS 85 68   BILITOTAL 0.6 0.3     Most Recent 3 INR's:  Recent Labs   Lab Test 02/17/22  1040 02/07/22  0957 01/31/22  0917   INR 0.96 0.95 1.00     Most Recent 3 Troponin's:  Recent Labs   Lab Test 06/25/21  2150 06/25/21  1441 06/25/21  0816   TROPI 0.120* 0.157* 0.188*       Medical Decision Making        Medical Decision Making               Sonal Tran MD, [facility]  Internal Medicine Hospitalist  10/13/2024

## 2024-10-13 NOTE — ED NOTES
"Pt ambulated and states that hernia \" feels like it's protruding a little more\" with slight increase in pain. Rates 2/10 at this time.  "

## 2024-10-14 ENCOUNTER — ANESTHESIA EVENT (OUTPATIENT)
Dept: SURGERY | Facility: HOSPITAL | Age: 59
End: 2024-10-14
Payer: COMMERCIAL

## 2024-10-14 VITALS
WEIGHT: 230 LBS | TEMPERATURE: 98.3 F | RESPIRATION RATE: 20 BRPM | DIASTOLIC BLOOD PRESSURE: 85 MMHG | HEIGHT: 67 IN | SYSTOLIC BLOOD PRESSURE: 163 MMHG | BODY MASS INDEX: 36.1 KG/M2 | HEART RATE: 62 BPM | OXYGEN SATURATION: 100 %

## 2024-10-14 PROCEDURE — 250N000013 HC RX MED GY IP 250 OP 250 PS 637: Performed by: HOSPITALIST

## 2024-10-14 PROCEDURE — 96376 TX/PRO/DX INJ SAME DRUG ADON: CPT

## 2024-10-14 PROCEDURE — 99204 OFFICE O/P NEW MOD 45 MIN: CPT | Performed by: PHYSICIAN ASSISTANT

## 2024-10-14 PROCEDURE — G0378 HOSPITAL OBSERVATION PER HR: HCPCS

## 2024-10-14 PROCEDURE — 99231 SBSQ HOSP IP/OBS SF/LOW 25: CPT | Mod: 57 | Performed by: SURGERY

## 2024-10-14 PROCEDURE — 99207 PR NO BILLABLE SERVICE THIS VISIT: CPT

## 2024-10-14 PROCEDURE — 250N000011 HC RX IP 250 OP 636: Performed by: HOSPITALIST

## 2024-10-14 PROCEDURE — 99239 HOSP IP/OBS DSCHRG MGMT >30: CPT | Performed by: INTERNAL MEDICINE

## 2024-10-14 RX ORDER — CEFAZOLIN SODIUM/WATER 2 G/20 ML
2 SYRINGE (ML) INTRAVENOUS SEE ADMIN INSTRUCTIONS
Status: CANCELLED | OUTPATIENT
Start: 2024-10-15

## 2024-10-14 RX ORDER — OXYCODONE HYDROCHLORIDE 5 MG/1
5 TABLET ORAL
Qty: 10 TABLET | Refills: 0 | Status: ON HOLD | OUTPATIENT
Start: 2024-10-14 | End: 2024-10-15

## 2024-10-14 RX ORDER — AMOXICILLIN 250 MG
1 CAPSULE ORAL 2 TIMES DAILY PRN
COMMUNITY
Start: 2024-10-14

## 2024-10-14 RX ORDER — CEFAZOLIN SODIUM/WATER 2 G/20 ML
2 SYRINGE (ML) INTRAVENOUS
Status: CANCELLED | OUTPATIENT
Start: 2024-10-15

## 2024-10-14 RX ADMIN — MIRABEGRON 25 MG: 25 TABLET, FILM COATED, EXTENDED RELEASE ORAL at 08:02

## 2024-10-14 RX ADMIN — MORPHINE SULFATE 2 MG: 2 INJECTION, SOLUTION INTRAMUSCULAR; INTRAVENOUS at 04:15

## 2024-10-14 RX ADMIN — MEGESTROL ACETATE 20 MG: 20 TABLET ORAL at 08:02

## 2024-10-14 RX ADMIN — ATORVASTATIN CALCIUM 10 MG: 10 TABLET, FILM COATED ORAL at 08:01

## 2024-10-14 RX ADMIN — TOLTERODINE TARTRATE 4 MG: 2 CAPSULE, EXTENDED RELEASE ORAL at 08:01

## 2024-10-14 RX ADMIN — OXYCODONE HYDROCHLORIDE 10 MG: 5 TABLET ORAL at 02:04

## 2024-10-14 RX ADMIN — SULFAMETHOXAZOLE AND TRIMETHOPRIM 1 TABLET: 800; 160 TABLET ORAL at 08:01

## 2024-10-14 RX ADMIN — OXYCODONE HYDROCHLORIDE 10 MG: 5 TABLET ORAL at 06:12

## 2024-10-14 RX ADMIN — PANTOPRAZOLE SODIUM 40 MG: 40 TABLET, DELAYED RELEASE ORAL at 08:01

## 2024-10-14 RX ADMIN — ACYCLOVIR 400 MG: 400 TABLET ORAL at 08:01

## 2024-10-14 RX ADMIN — ACETAMINOPHEN 650 MG: 325 TABLET ORAL at 02:04

## 2024-10-14 RX ADMIN — OXYCODONE HYDROCHLORIDE 10 MG: 5 TABLET ORAL at 10:51

## 2024-10-14 RX ADMIN — MORPHINE SULFATE 2 MG: 2 INJECTION, SOLUTION INTRAMUSCULAR; INTRAVENOUS at 08:03

## 2024-10-14 RX ADMIN — HYDROXYZINE HYDROCHLORIDE 25 MG: 25 TABLET, FILM COATED ORAL at 06:12

## 2024-10-14 ASSESSMENT — ACTIVITIES OF DAILY LIVING (ADL)
ADLS_ACUITY_SCORE: 35
ADLS_ACUITY_SCORE: 31
ADLS_ACUITY_SCORE: 35

## 2024-10-14 NOTE — DISCHARGE SUMMARY
"[facility]  Hospitalist Discharge Summary      Date of Admission:  10/13/2024  Date of Discharge:  10/14/2024  Discharging Provider: Christopher Garcia MD  Discharge Service: Hospitalist Service    Discharge Diagnoses   Principal Problem:    Left inguinal hernia - to return for surgery       H/o prostate cancer - CT 10/13/24 shows possible nodule in prostatectomy bed - can follow up with urology     Active Problems:    Follicular lymphoma grade i, lymph nodes of multiple sites (H)    History of malignant neoplasm of prostate    Hypogammaglobulinemia (H)    IgA deficiency (H)    Obstructive sleep apnea        Clinically Significant Risk Factors     # Obesity: Estimated body mass index is 36.02 kg/m  as calculated from the following:    Height as of this encounter: 1.702 m (5' 7\").    Weight as of this encounter: 104.3 kg (230 lb).       Follow-ups Needed After Discharge      Follow up with Surgery tomorrow for hernia surgery scheduled.        Discharge Disposition   Discharged to home  Condition at discharge: Stable    Hospital Course   60 yo M with h/o NHL, IgA defic., prost CA, and JAZLYN who presented with severe pain in left groin related to left inguinal hernia. He is scheduled for surgery 11/7/24 but does not feel he can wait that long.  Patient was admitted and started on pain meds.  Surgery saw the patient and was able to mostly reduce it at the bedside. They were able to find some OR time on the schedule for tomorrow 10/15 so the patient will go home with a few oxycodone tabs and return for surgery in the morning.    CT scan did not show any signs of obstruction.  It did show a question of nodule in the area where the prostate was removed.  He can follow up with urology on that - he had a positive PET scan showing recurrence in January 2024 so this may not be unexpected.    Consultations This Hospital Stay   CARE MANAGEMENT / SOCIAL WORK IP CONSULT  SURGERY GENERAL IP CONSULT  PAIN " MANAGEMENT ADULT IP CONSULT    Code Status   Full Code    Time Spent on this Encounter   I, Christopher Garcia MD, personally saw the patient today and spent greater than 30 minutes discharging this patient.       Christopher Garcia MD  [facility] EXTENDED RECOVERY AND SHORT STAY  [address] [place] [id number]  Phone: [phone]  Fax: [phone]  ______________________________________________________________________    Physical Exam   Vital Signs: Temp: 98.3  F (36.8  C) Temp src: Oral BP: (!) 163/85 Pulse: 62   Resp: 20 SpO2: 100 % O2 Device: None (Room air)    Weight: 230 lbs 0 oz  The patient was interviewed and examined on the day of discharge.         Primary Care Physician   Abbey Maynard    Discharge Orders      Primary Care - Care Coordination Referral      Reason for your hospital stay    Hernia pain     Follow-up and recommended labs and tests     Follow up with Surgery tomorrow for hernia surgery scheduled.     Activity    Your activity upon discharge: activity as tolerated, no heavy lifting until cleared by surgery     Diet    Follow this diet upon discharge: Current Diet: regular, nothing to eat after midnight tonight except small sips of water with medications.       Significant Results and Procedures   Results for orders placed or performed during the hospital encounter of 10/13/24   CT Abdomen Pelvis w Contrast    Narrative    EXAM: CT ABDOMEN PELVIS W CONTRAST  LOCATION: [facility]  DATE: 10/13/2024    INDICATION: Left inguinal hernia pain.  COMPARISON: PET/CT 1/17/2024.  TECHNIQUE: CT scan of the abdomen and pelvis was performed following injection of IV contrast. Multiplanar reformats were obtained. Dose reduction techniques were used.  CONTRAST: Isovue 370 90 ml.    FINDINGS:   LOWER CHEST: Mild bibasilar atelectasis. Stable left lower lobe nodule measuring 6 mm series 3 image 45 when compared to an older CT from 8/13/2018. Another stable  small nodule noted at the left lower lobe image 15. Given the stability, these are   consistent with benign nodules.    HEPATOBILIARY: No acute liver abnormality. Gallbladder appears unremarkable.    PANCREAS: Stable hazy opacity about the pancreas. Homogeneous enhancement at the pancreas parenchyma. No focal fluid collection identified. A few mildly prominent adjacent lymph nodes.    SPLEEN: Normal.    ADRENAL GLANDS: Normal.    KIDNEYS/BLADDER: No significant mass, stones, or hydronephrosis. There are simple or benign cysts. No follow up is needed. Bladder appears unremarkable.    BOWEL: herniated distal descending colon versus proximal sigmoid within a left inguinal hernia measuring 5.6 x 4.7 x 7.6 cm series 3 image 219. There is no bowel obstruction at this time. No focal fluid collection. Small fluid also noted within the   hernia. Normal appendix.    LYMPH NODES: Haziness of the fat at the mesentery and extending about the pancreas again identified appearing stable. Several stable prominent lymph nodes. Left para-aortic example to be 13 mm series 3 image 98. Another region that is stable is near the   left renal vein image 89.    VASCULATURE: Scattered calcifications. No acute abnormality.    PELVIC ORGANS: Prostatectomy. Small pelvic fluid. Small nodule between the bladder and anorectal level is approximately 9 mm series 3 image 208.    MUSCULOSKELETAL: Spine degenerative changes. No focal bone lesion identified.      Impression    IMPRESSION:   1.  Left inguinal canal hernia containing herniated descending/proximal sigmoid colon is identified. No bowel obstruction at this time. There is small fluid within the hernia sac.  2.  Overall stable appearance of hazy opacities at the mesentery, extending about the pancreas, and retroperitoneum. Stable mildly prominent several lymph nodes that are indeterminate.  3.  A small nodule at the prostatectomy operative bed is noted and is indeterminate. It lies between the  posterior midline bladder and anorectal level.     *Note: Due to a large number of results and/or encounters for the requested time period, some results have not been displayed. A complete set of results can be found in Results Review.       Discharge Medications   Current Discharge Medication List        START taking these medications    Details   oxyCODONE (ROXICODONE) 5 MG tablet Take 1 tablet (5 mg) by mouth every 3 hours as needed for moderate to severe pain.  Qty: 10 tablet, Refills: 0    Associated Diagnoses: Left inguinal hernia      senna-docusate (SENOKOT-S/PERICOLACE) 8.6-50 MG tablet Take 1 tablet by mouth 2 times daily as needed for constipation.    Associated Diagnoses: Left inguinal hernia           CONTINUE these medications which have NOT CHANGED    Details   acyclovir (ZOVIRAX) 800 MG tablet Take 0.5 tablets (400 mg) by mouth every 12 hours.  Qty: 180 tablet, Refills: 1    Associated Diagnoses: Follicular lymphoma grade i, lymph nodes of multiple sites (H)      albuterol (PROAIR HFA/PROVENTIL HFA/VENTOLIN HFA) 108 (90 Base) MCG/ACT inhaler Inhale 2 puffs into the lungs every 4 hours as needed for wheezing, cough or shortness of breath.      atorvastatin (LIPITOR) 10 MG tablet Take 10 mg by mouth daily.      beclomethasone HFA (QVAR REDIHALER) 80 MCG/ACT inhaler Inhale 1 puff into the lungs 2 times daily as needed (when sick).      benzonatate (TESSALON) 200 MG capsule Take 200 mg by mouth 3 times daily as needed for cough.      Calcium Polycarbophil (FIBER) 625 MG tablet Take 4 tablets by mouth daily.      fluticasone (FLONASE) 50 MCG/ACT nasal spray Spray 2 sprays into both nostrils daily as needed for rhinitis or allergies.      loratadine (CLARITIN) 10 MG tablet Take 10 mg by mouth daily as needed for allergies.      magnesium oxide (MAG-OX) 400 MG tablet Take 400 mg by mouth daily.      medical cannabis (Patient's own supply) 1 Dose daily as needed (The purpose of this order is to document that  "the patient reports taking medical cannabis)      megestrol (MEGACE) 20 MG tablet Take 1 tablet by mouth 2 times daily      mirabegron (MYRBETRIQ) 25 MG 24 hr tablet Take 25 mg by mouth daily      multivitamin w/minerals (THERA-VIT-M) tablet Take 1 tablet by mouth daily      omeprazole (PRILOSEC) 20 MG DR capsule Take 20 mg by mouth daily.      polyethylene glycol (MIRALAX) 17 GM/Dose powder Take 17 g (1 Capful) by mouth 2 times daily for 5 days, THEN 17 g (1 Capful) daily as needed.  Qty: 527 g, Refills: 0      relugolix (ORGOVYX) 120 MG tablet Take 120 mg by mouth daily.      sildenafil (REVATIO) 20 MG tablet Take 100 mg by mouth daily as needed.      sulfamethoxazole-trimethoprim (BACTRIM DS) 800-160 MG tablet TAKE 1 TABLET BY MOUTH EVERY MONDAY AND TUESDAY TWICE DAILY  Qty: 50 tablet, Refills: 3    Associated Diagnoses: Low CD4 cell count determined by flow cytometry; Follicular lymphoma grade I, unspecified body region (H)      tolterodine ER (DETROL LA) 4 MG 24 hr capsule Take 1 capsule (4 mg) by mouth daily  Qty: 90 capsule, Refills: 11    Associated Diagnoses: Follicular lymphoma grade i, lymph nodes of multiple sites (H)      Vitamin D3 (VITAMIN D, CHOLECALCIFEROL,) 25 mcg (1000 units) tablet Take 1 tablet by mouth daily.      XTANDI 40 MG capsule Take 160 mg by mouth daily.      zinc gluconate 50 MG tablet Take 50 mg by mouth daily.           Allergies   Allergies   Allergen Reactions    Rituxan [Rituximab] Shortness Of Breath     Wife states \"seizure-like symptoms\"    Dilaudid [Hydromorphone] Rash     Patient developed urticaria following dilaudid administration    Levaquin [Levofloxacin]      Significant tendon pain    Ondansetron Itching     "

## 2024-10-14 NOTE — DISCHARGE INSTRUCTIONS
From your General Surgery Team:  You were seen by [facility] general surgery.  If you do not have an appointment and would like to would like to schedule a follow up appointment with general surgery in clinic, please call us at [phone] to schedule an appointment at your convenience.

## 2024-10-14 NOTE — PLAN OF CARE
"Goal Outcome Evaluation:      Plan of Care Reviewed With: patient    Overall Patient Progress: improving       Problem: Adult Inpatient Plan of Care  Goal: Plan of Care Review  Description: The Plan of Care Review/Shift note should be completed every shift.  The Outcome Evaluation is a brief statement about your assessment that the patient is improving, declining, or no change.  This information will be displayed automatically on your shift  note.  10/14/2024 1110 by Jossie Foy RN  Outcome: Progressing  Flowsheets (Taken 10/14/2024 1110)  Plan of Care Reviewed With: patient  Overall Patient Progress: improving  10/14/2024 1109 by Jossie Foy RN  Outcome: Progressing  Flowsheets (Taken 10/14/2024 1109)  Plan of Care Reviewed With: patient  Overall Patient Progress: improving  Goal: Patient-Specific Goal (Individualized)  Description: You can add care plan individualizations to a care plan. Examples of Individualization might be:  \"Parent requests to be called daily at 9am for status\", \"I have a hard time hearing out of my right ear\", or \"Do not touch me to wake me up as it startles  me\".  10/14/2024 1110 by Jossie Foy RN  Outcome: Progressing  10/14/2024 1109 by Jossie Foy RN  Outcome: Progressing  Goal: Absence of Hospital-Acquired Illness or Injury  10/14/2024 1110 by Jossie Foy RN  Outcome: Progressing  10/14/2024 1109 by Jossie Foy RN  Outcome: Progressing  Intervention: Identify and Manage Fall Risk  Recent Flowsheet Documentation  Taken 10/14/2024 0830 by Jossie Foy RN  Safety Promotion/Fall Prevention:   treat underlying cause   treat reversible contributory factors   supervised activity   patient and family education   nonskid shoes/slippers when out of bed   mobility aid in reach   lighting adjusted   increase visualization of patient   increased rounding and observation   clutter free environment maintained  Goal: Optimal Comfort and Wellbeing  10/14/2024 1110 by Law" VALERIE Sethi  Outcome: Progressing  10/14/2024 1109 by Jossie Foy, RN  Outcome: Progressing  Intervention: Monitor Pain and Promote Comfort  Recent Flowsheet Documentation  Taken 10/14/2024 0752 by Jossie Foy, RN  Pain Management Interventions:   distraction   declines   care clustered  Goal: Readiness for Transition of Care  10/14/2024 1110 by Jossie Foy, RN  Outcome: Progressing  10/14/2024 1109 by Jossie Foy, RN  Outcome: Progressing

## 2024-10-14 NOTE — CONSULTS
[facility] ACUTE INPATIENT PAIN SERVICE    [facility], [facility], [facility], [facility], [place]   PAIN CONSULT      Assessment/Plan:  Juvenal Blake is a 59 year old male who was admitted on 10/13/2024.  I was asked by Dr. Christopher Garcia to see the patient for management of his left inguinal hernia pain. Diagnosed in December 2023. Reports surgical history of multiple hernia repairs bilaterally. He was seen and evaluated by general surgery who has scheduled surgery on 11/07/24. He is hoping to get the surgery sooner as his pain affects the ability to complete his ADLs. Past medical history of IgA deficiency, prostate CA s/p prostatectomy and LND on hormonal therapy, JAZLYN w/inspire. Admitted for observation and pain control. Describes pain as 9/10.    Coordinated care with Dr. Christopher Garcia, he will proceed with surgery tomorrow, 10/15.    [facility] reviewed: He is opioid naive  *Percocet 10-325mg #10 tablets filled on 03/07/24  *Codeine-Guaifen 10-100mg/5mL 200mL filled on 12/15/23    Opioids received in the past 24h:  *(3)2mg IV morphine  *(3)10mg oxycodone    PLAN:  Acute pain secondary to left sided recurrent inguinal hernia. Opioid naive.  Multimodal Medication Therapy:   Adjuvants:   - APAP 650mg q4h prn  - Hydroxyzine 25mg q6h prn  - Topical Lidocaine  Opioids:   - Oxycodone 5-10mg q4h prn - first line  - IV morphine 2mg q4h prn - second line  Non-medication interventions- Ice  Constipation Prophylaxis- Bisacodyl.   Follow up /Discharge Recommendations - We recommend prescribing the following at the time of discharge:  1 Day Rx of Oxycodone given to manage his pain until his surgery scheduled for tomorrow.           Subjective:  Juvenal is seen today in their bed alert and oriented in no acute distress. Reports his pain is currently a 9/10 located in LLQ. He was scheduled for surgery on 11/07, however, his pain has been disrupting his ability to complete his ADLs and he is requesting to get in sooner if possible.  "States he had a poor night of pain control. Today has been better. Managing with oxycodone and IV morphine.      Denies nausea, vomiting, constipation.      Reviewed continuing with current plan, all questions answered. No other pain concerns.          History   Drug Use    Types: Marijuana         Tobacco Use      Smoking status: Former        Packs/day: 0.00        Types: Cigarettes        Quit date: 1995        Years since quittin.3        Passive exposure: Past      Smokeless tobacco: Never        Objective:  Vital signs in last 24 hours:  B/P: 163/85, T: 98.3, P: 62, R: 20   Blood pressure (!) 163/85, pulse 62, temperature 98.3  F (36.8  C), temperature source Oral, resp. rate 20, height 1.702 m (5' 7\"), weight 104.3 kg (230 lb), SpO2 100%.        Review of Systems:   As per subjective, all others negative.    Physical Exam    General: in no apparent distress and non-toxic   HEENT: Head normocephalic atraumatic, oral mucosa moist. Sclerae anicteric  CV: Regular rhythm, normal rate, no murmurs  Resp: No wheezes, no rales or rhonchi, no focal consolidations  GI: Normoactive bowel sounds; Guarding.   Skin: No rashes or lesions  Extremities: No peripheral edema  Psych: Normal affect, mood euthymic  Neuro: Grossly normal          Imaging:  Personally Reviewed.    Results for orders placed or performed during the hospital encounter of 10/13/24   CT Abdomen Pelvis w Contrast    Impression    IMPRESSION:   1.  Left inguinal canal hernia containing herniated descending/proximal sigmoid colon is identified. No bowel obstruction at this time. There is small fluid within the hernia sac.  2.  Overall stable appearance of hazy opacities at the mesentery, extending about the pancreas, and retroperitoneum. Stable mildly prominent several lymph nodes that are indeterminate.  3.  A small nodule at the prostatectomy operative bed is noted and is indeterminate. It lies between the posterior midline bladder and anorectal " level.        Lab Results:  Personally Reviewed.   Last Comprehensive Metabolic Panel:  Sodium   Date Value Ref Range Status   10/13/2024 140 135 - 145 mmol/L Final   07/08/2021 138 133 - 144 mmol/L Final     Potassium   Date Value Ref Range Status   10/13/2024 4.2 3.4 - 5.3 mmol/L Final   06/24/2022 4.1 3.5 - 5.0 mmol/L Final   07/08/2021 4.2 3.4 - 5.3 mmol/L Final     Chloride   Date Value Ref Range Status   10/13/2024 106 98 - 107 mmol/L Final   06/24/2022 106 98 - 107 mmol/L Final   07/08/2021 107 94 - 109 mmol/L Final     Carbon Dioxide   Date Value Ref Range Status   07/08/2021 28 20 - 32 mmol/L Final     Carbon Dioxide (CO2)   Date Value Ref Range Status   10/13/2024 24 22 - 29 mmol/L Final   06/24/2022 24 22 - 31 mmol/L Final     Anion Gap   Date Value Ref Range Status   10/13/2024 10 7 - 15 mmol/L Final   06/24/2022 11 5 - 18 mmol/L Final   07/08/2021 4 3 - 14 mmol/L Final     Glucose   Date Value Ref Range Status   10/13/2024 107 (H) 70 - 99 mg/dL Final   06/24/2022 174 (H) 70 - 125 mg/dL Final   09/21/2021 74 70 - 99 mg/dL Final     Urea Nitrogen   Date Value Ref Range Status   10/13/2024 11.6 8.0 - 23.0 mg/dL Final   06/24/2022 21 8 - 22 mg/dL Final   07/08/2021 11 7 - 30 mg/dL Final     Creatinine   Date Value Ref Range Status   10/13/2024 0.98 0.67 - 1.17 mg/dL Final   07/08/2021 1.03 0.66 - 1.25 mg/dL Final     GFR Estimate   Date Value Ref Range Status   10/13/2024 88 >60 mL/min/1.73m2 Final     Comment:     eGFR calculated using 2021 CKD-EPI equation.   07/08/2021 80 >60 mL/min/[1.73_m2] Final     Comment:     Non  GFR Calc  Starting 12/18/2018, serum creatinine based estimated GFR (eGFR) will be   calculated using the Chronic Kidney Disease Epidemiology Collaboration   (CKD-EPI) equation.       Calcium   Date Value Ref Range Status   10/13/2024 9.0 8.8 - 10.4 mg/dL Final     Comment:     Reference intervals for this test were updated on 7/16/2024 to reflect our healthy population  "more accurately. There may be differences in the flagging of prior results with similar values performed with this method. Those prior results can be interpreted in the context of the updated reference intervals.   07/08/2021 8.9 8.5 - 10.1 mg/dL Final        UA: No results found for: \"UAMP\", \"UBARB\", \"BENZODIAZEUR\", \"UCANN\", \"UCOC\", \"OPIT\", \"UPCP\"           Please see A&P for additional details of medical decision making.    Brian Ross PA-C  Acute Care Pain Management  Team  Hours of pain coverage Mon-Fri 8-1600, afterhours please call the house officer    [facility] ([employer], [place], [place], [place])   Page via [employer] web console -Click for Vocera            "

## 2024-10-14 NOTE — PROGRESS NOTES
"PRIMARY DIAGNOSIS: ACUTE PAIN  OUTPATIENT/OBSERVATION GOALS TO BE MET BEFORE DISCHARGE:  1. Pain Status: Improved but still requiring IV narcotics.    2. Return to near baseline physical activity: Yes    3. Cleared for discharge by consultants (if involved): Yes    Discharge Planner Nurse   Safe discharge environment identified: Yes  Barriers to discharge: no        Entered by: Jossie Foy RN 10/14/2024 11:04 AM     Please review provider order for any additional goals.   Nurse to notify provider when observation goals have been met and patient is ready for discharge.    Patient A& O x4. Denies SOB, Pain helps with Oxycodone and morphine. Discharging today and back tomorrow for inguinal hernia surgery at 0730.  Provider will send some oxycodone home today to help for the pain  NPO at midnight tonight.   BP (!) 163/85 (BP Location: Left arm)   Pulse 62   Temp 98.3  F (36.8  C) (Oral)   Resp 20   Ht 1.702 m (5' 7\")   Wt 104.3 kg (230 lb)   SpO2 100%   BMI 36.02 kg/m         Jossie Foy RN on 10/14/2024 at 11:08 AM\  "

## 2024-10-14 NOTE — ED NOTES
"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3 kg (230 lb)   SpO2 98%   BMI 36.02 kg/m       Cardiac Rhythm: SR    Pain Score: 8/10 just received morphine    Is the Patient Confused:  No    Last Food or Drink: 10/13/24     Focused Assessment:  Patient was dx with hernia 2 days ago, has surgery scheduled for November 7- \"can't wait that long.\"  Pt states that he has been pushing hernia back into abdomen.  Pt a/o SBA . Received Dilaudid, started itching developed rash on abdomen.         Tests Performed: Done: Labs and Imaging       Treatments Provided:      Family Dynamics/Concerns: No    Family Updated On Visitor Policy: Yes    Plan of Care Communicated to Family: Yes    Who Was Updated about Plan of Care: wife    Belongings Checklist Done and Signed by Patient: Yes    Belongings Sent with Patient:     Medications sent with patient:     Covid: asymptomatic , na    Additional Information:     Amanda Pierre RN   " 10/13/2024 7:07 PM

## 2024-10-14 NOTE — PROGRESS NOTES
"PRIMARY DIAGNOSIS: ACUTE PAIN  OUTPATIENT/OBSERVATION GOALS TO BE MET BEFORE DISCHARGE:  1. Pain Status: No improvement noted. Consider adjustment in pain regimen.    2. Return to near baseline physical activity: No    3. Cleared for discharge by consultants (if involved): Yes    Discharge Planner Nurse   Safe discharge environment identified: Yes  Barriers to discharge: Yes       Entered by: Paula Billy RN 10/14/2024 6:32 AM     Pt pain not adequately controlled. Pt stated multiple times overnight that \"I am hoping they will get this surgery done in the next couple days\"  "

## 2024-10-14 NOTE — PROGRESS NOTES
General Surgery Progress Note:    Hospital Day # 0    ASSESSMENT:     1. Left inguinal hernia        Juvenal Blake is a 59 year old male here with left inguinal hernia pain which has persisted / worsened since he was seen 10/3/24 and set up for elective repair 11/7 with our team. Patient is afebrile and HTN otherwise vitally stable imaging with colon within left inguinal hernia though not obstructive or ischemic.     PLAN:   -NPO for now  -Activity as tolerated  -Discussion with surgeon regarding OR availability today/tomorrow for potential hernia repair  -If patient wishes to eat and follow-up with outpatient hernia repair would be okay with this, he is eager for repair this morning so will attempt to find OR time  -Medical management per primary team    SUBJECTIVE:   Juvenal Blake was seen on rounds. Patient states he is doing okay, is still quite sore and tenderness on the left inguinal side. Doesn't believe passing flatus, was not having bm last prior to admission. Has not eaten yet this morning only sip of water. States he feels the tenderness has been there for a while but has been painful intermittent and more constant since his visit on the third of this month. He is eager for surgery is not sure he will make it to scheduled surgery.    Tenderness worse with lying down in bed and standing, best within chair but not ever gone    Denies fever, chills, emesis. Had a mild amount of intermittent nausea. Doesn't believe passing flatus, no bowel movement since prior to admission    Patient states he has had hernia repair on both sides, unsure and unsure but believes there was mesh at one point. Per chart had umbilical hernia repair in 2020 by our team.    Patient Vitals for the past 24 hrs:   BP Temp Temp src Pulse Resp SpO2 Height Weight   10/14/24 0730 (!) 163/85 98.3  F (36.8  C) Oral 62 20 100 % -- --   10/14/24 0415 (!) 164/96 97.9  F (36.6  C) Oral 59 20 99 % -- --   10/13/24 2348 -- -- -- -- -- 99 % --  "--   10/13/24 2344 (!) 154/86 -- -- -- -- 99 % -- --   10/13/24 1934 107/75 97.8  F (36.6  C) Oral 57 -- 99 % -- --   10/13/24 1904 (!) 155/75 98  F (36.7  C) Axillary 55 18 98 % -- --   10/13/24 1500 (!) 145/76 -- -- 63 -- 97 % -- --   10/13/24 1315 138/76 -- -- -- -- -- -- --   10/13/24 1243 (!) 148/76 -- -- 63 18 96 % -- --   10/13/24 1052 (!) 140/69 97.5  F (36.4  C) Temporal 68 18 99 % 1.702 m (5' 7\") 104.3 kg (230 lb)       Physical Exam:  General: patient seen resting in bed in no acute distress  Resp: no increased work of breathing, breathing comfortably on room air  Abdomen: generally soft some tenderness with palpation over the lower midline / suprapubic area. Right inguinal region normal nontender. Left inguinal hernia palpated firmly soft with patient sitting/leaning back in chair remains herniated unable to fully reduce at time of exam. Tenderness with palpation over the hernia.   Extremities: No edema, cyanosis, or obvious deformities visualized on exam    Admission on 10/13/2024   Component Date Value    WBC Count 10/13/2024 4.3     RBC Count 10/13/2024 3.45 (L)     Hemoglobin 10/13/2024 11.7 (L)     Hematocrit 10/13/2024 33.4 (L)     MCV 10/13/2024 97     MCH 10/13/2024 33.9 (H)     MCHC 10/13/2024 35.0     RDW 10/13/2024 14.0     Platelet Count 10/13/2024 100 (L)     Sodium 10/13/2024 140     Potassium 10/13/2024 4.2     Chloride 10/13/2024 106     Carbon Dioxide (CO2) 10/13/2024 24     Anion Gap 10/13/2024 10     Urea Nitrogen 10/13/2024 11.6     Creatinine 10/13/2024 0.98     GFR Estimate 10/13/2024 88     Calcium 10/13/2024 9.0     Glucose 10/13/2024 107 (H)     Lactic Acid 10/13/2024 1.1         Katiuska Romo PA-C  [facility]  [address] [place] [id number]  [facility] [phone]     "

## 2024-10-14 NOTE — PLAN OF CARE
"Goal Outcome Evaluation:      Plan of Care Reviewed With: patient    Overall Patient Progress: improving      Problem: Adult Inpatient Plan of Care  Goal: Plan of Care Review  Description: The Plan of Care Review/Shift note should be completed every shift.  The Outcome Evaluation is a brief statement about your assessment that the patient is improving, declining, or no change.  This information will be displayed automatically on your shift  note.  10/14/2024 1111 by Jossie Foy RN  Outcome: Adequate for Care Transition  Flowsheets (Taken 10/14/2024 1111)  Plan of Care Reviewed With: patient  Overall Patient Progress: improving  10/14/2024 1110 by Jossie Foy RN  Outcome: Progressing  Flowsheets (Taken 10/14/2024 1110)  Plan of Care Reviewed With: patient  Overall Patient Progress: improving  10/14/2024 1109 by Jossie Foy RN  Outcome: Progressing  Flowsheets (Taken 10/14/2024 1109)  Plan of Care Reviewed With: patient  Overall Patient Progress: improving  Goal: Patient-Specific Goal (Individualized)  Description: You can add care plan individualizations to a care plan. Examples of Individualization might be:  \"Parent requests to be called daily at 9am for status\", \"I have a hard time hearing out of my right ear\", or \"Do not touch me to wake me up as it startles  me\".  10/14/2024 1111 by Jossie Foy RN  Outcome: Adequate for Care Transition  10/14/2024 1110 by Jossie Foy RN  Outcome: Progressing  10/14/2024 1109 by Jossie Foy RN  Outcome: Progressing  Goal: Absence of Hospital-Acquired Illness or Injury  10/14/2024 1111 by Jossie Foy RN  Outcome: Adequate for Care Transition  10/14/2024 1110 by Jossie Foy RN  Outcome: Progressing  10/14/2024 1109 by Jossie Foy RN  Outcome: Progressing  Intervention: Identify and Manage Fall Risk  Recent Flowsheet Documentation  Taken 10/14/2024 0830 by Jossie Foy RN  Safety Promotion/Fall Prevention:   treat underlying cause   treat " reversible contributory factors   supervised activity   patient and family education   nonskid shoes/slippers when out of bed   mobility aid in reach   lighting adjusted   increase visualization of patient   increased rounding and observation   clutter free environment maintained  Goal: Optimal Comfort and Wellbeing  10/14/2024 1111 by Jossie Foy RN  Outcome: Adequate for Care Transition  10/14/2024 1110 by Jossie Foy RN  Outcome: Progressing  10/14/2024 1109 by Jossie Fyo RN  Outcome: Progressing  Intervention: Monitor Pain and Promote Comfort  Recent Flowsheet Documentation  Taken 10/14/2024 0752 by Jossie Foy RN  Pain Management Interventions:   distraction   declines   care clustered  Goal: Readiness for Transition of Care  10/14/2024 1111 by Josise Foy RN  Outcome: Adequate for Care Transition  10/14/2024 1110 by Jossie Foy RN  Outcome: Progressing  10/14/2024 1109 by Jossie Foy RN  Outcome: Progressing     Patient A& O x4. Denies SOB, Pain helps with Oxycodone and morphine. Discharging today and back tomorrow for inguinal hernia surgery at 0730.  Provider will send some oxycodone home today to help for the pain NPO at midnight tonight.   Patient declined wheelchair. Wife is here helping him dress. Went through all discharge paper. Explained meds how to take them. Remind patient's all personal belongings. Patient verbalized understanding and has no question at this time     Jossie Foy RN on 10/14/2024 at 11:30 AM

## 2024-10-14 NOTE — PROGRESS NOTES
"PRIMARY DIAGNOSIS: ACUTE PAIN  OUTPATIENT/OBSERVATION GOALS TO BE MET BEFORE DISCHARGE:  1. Pain Status: No improvement noted. Consider adjustment in pain regimen.    2. Return to near baseline physical activity: No    3. Cleared for discharge by consultants (if involved): Yes    Discharge Planner Nurse   Safe discharge environment identified: Yes  Barriers to discharge: Yes       Entered by: Paula Billy RN 10/14/2024 6:30 AM     Pt pain not adequately controlled. Pt stated multiple times overnight that \"I am hoping they will get this surgery done in the next couple days\"  "

## 2024-10-15 ENCOUNTER — ANESTHESIA (OUTPATIENT)
Dept: SURGERY | Facility: HOSPITAL | Age: 59
End: 2024-10-15
Payer: COMMERCIAL

## 2024-10-15 ENCOUNTER — HOSPITAL ENCOUNTER (OUTPATIENT)
Facility: HOSPITAL | Age: 59
Discharge: HOME | End: 2024-10-15
Attending: SURGERY | Admitting: SURGERY
Payer: COMMERCIAL

## 2024-10-15 VITALS
HEART RATE: 57 BPM | SYSTOLIC BLOOD PRESSURE: 113 MMHG | WEIGHT: 229 LBS | DIASTOLIC BLOOD PRESSURE: 57 MMHG | TEMPERATURE: 98.7 F | OXYGEN SATURATION: 97 % | RESPIRATION RATE: 18 BRPM | BODY MASS INDEX: 35.87 KG/M2

## 2024-10-15 DIAGNOSIS — G89.18 ACUTE POST-OPERATIVE PAIN: Primary | ICD-10-CM

## 2024-10-15 PROCEDURE — 250N000009 HC RX 250: Performed by: REGISTERED NURSE

## 2024-10-15 PROCEDURE — 250N000013 HC RX MED GY IP 250 OP 250 PS 637: Performed by: ANESTHESIOLOGY

## 2024-10-15 PROCEDURE — 250N000025 HC SEVOFLURANE, PER MIN: Performed by: SURGERY

## 2024-10-15 PROCEDURE — C1781 MESH (IMPLANTABLE): HCPCS | Performed by: SURGERY

## 2024-10-15 PROCEDURE — 999N000141 HC STATISTIC PRE-PROCEDURE NURSING ASSESSMENT: Performed by: SURGERY

## 2024-10-15 PROCEDURE — 272N000001 HC OR GENERAL SUPPLY STERILE: Performed by: SURGERY

## 2024-10-15 PROCEDURE — 250N000009 HC RX 250: Performed by: SURGERY

## 2024-10-15 PROCEDURE — 360N000075 HC SURGERY LEVEL 2, PER MIN: Performed by: SURGERY

## 2024-10-15 PROCEDURE — 710N000009 HC RECOVERY PHASE 1, LEVEL 1, PER MIN: Performed by: SURGERY

## 2024-10-15 PROCEDURE — 250N000011 HC RX IP 250 OP 636: Performed by: SURGERY

## 2024-10-15 PROCEDURE — 370N000017 HC ANESTHESIA TECHNICAL FEE, PER MIN: Performed by: SURGERY

## 2024-10-15 PROCEDURE — 250N000011 HC RX IP 250 OP 636: Performed by: REGISTERED NURSE

## 2024-10-15 PROCEDURE — 49507 PRP I/HERN INIT BLOCK >5 YR: CPT | Performed by: SURGERY

## 2024-10-15 PROCEDURE — 49507 PRP I/HERN INIT BLOCK >5 YR: CPT | Performed by: REGISTERED NURSE

## 2024-10-15 PROCEDURE — 49507 PRP I/HERN INIT BLOCK >5 YR: CPT | Performed by: ANESTHESIOLOGY

## 2024-10-15 PROCEDURE — 258N000003 HC RX IP 258 OP 636: Performed by: REGISTERED NURSE

## 2024-10-15 PROCEDURE — 710N000012 HC RECOVERY PHASE 2, PER MINUTE: Performed by: SURGERY

## 2024-10-15 DEVICE — SELF-GRIPPING POLYESTER MESH,LEFT ANATOMICAL, POLYESTER WITH POLYLACTIC ACID GRIPS
Type: IMPLANTABLE DEVICE | Site: ABDOMEN | Status: FUNCTIONAL
Brand: PROGRIP

## 2024-10-15 RX ORDER — BUPIVACAINE HYDROCHLORIDE AND EPINEPHRINE 2.5; 5 MG/ML; UG/ML
INJECTION, SOLUTION EPIDURAL; INFILTRATION; INTRACAUDAL; PERINEURAL PRN
Status: DISCONTINUED | OUTPATIENT
Start: 2024-10-15 | End: 2024-10-15 | Stop reason: HOSPADM

## 2024-10-15 RX ORDER — KETAMINE HYDROCHLORIDE 10 MG/ML
INJECTION INTRAMUSCULAR; INTRAVENOUS PRN
Status: DISCONTINUED | OUTPATIENT
Start: 2024-10-15 | End: 2024-10-15

## 2024-10-15 RX ORDER — ONDANSETRON 2 MG/ML
4 INJECTION INTRAMUSCULAR; INTRAVENOUS EVERY 30 MIN PRN
Status: DISCONTINUED | OUTPATIENT
Start: 2024-10-15 | End: 2024-10-15 | Stop reason: HOSPADM

## 2024-10-15 RX ORDER — CEFAZOLIN SODIUM/WATER 2 G/20 ML
2 SYRINGE (ML) INTRAVENOUS
Status: COMPLETED | OUTPATIENT
Start: 2024-10-15 | End: 2024-10-15

## 2024-10-15 RX ORDER — LIDOCAINE 40 MG/G
CREAM TOPICAL
Status: DISCONTINUED | OUTPATIENT
Start: 2024-10-15 | End: 2024-10-15 | Stop reason: HOSPADM

## 2024-10-15 RX ORDER — OXYCODONE HYDROCHLORIDE 5 MG/1
5-10 TABLET ORAL EVERY 4 HOURS PRN
Qty: 10 TABLET | Refills: 0 | Status: SHIPPED | OUTPATIENT
Start: 2024-10-15

## 2024-10-15 RX ORDER — ONDANSETRON 4 MG/1
4 TABLET, ORALLY DISINTEGRATING ORAL EVERY 30 MIN PRN
Status: DISCONTINUED | OUTPATIENT
Start: 2024-10-15 | End: 2024-10-15 | Stop reason: HOSPADM

## 2024-10-15 RX ORDER — SODIUM CHLORIDE, SODIUM LACTATE, POTASSIUM CHLORIDE, CALCIUM CHLORIDE 600; 310; 30; 20 MG/100ML; MG/100ML; MG/100ML; MG/100ML
INJECTION, SOLUTION INTRAVENOUS CONTINUOUS PRN
Status: DISCONTINUED | OUTPATIENT
Start: 2024-10-15 | End: 2024-10-15

## 2024-10-15 RX ORDER — ACETAMINOPHEN 325 MG/1
975 TABLET ORAL ONCE
Status: COMPLETED | OUTPATIENT
Start: 2024-10-15 | End: 2024-10-15

## 2024-10-15 RX ORDER — LABETALOL HYDROCHLORIDE 5 MG/ML
5 INJECTION, SOLUTION INTRAVENOUS EVERY 10 MIN PRN
Status: DISCONTINUED | OUTPATIENT
Start: 2024-10-15 | End: 2024-10-15 | Stop reason: HOSPADM

## 2024-10-15 RX ORDER — SODIUM CHLORIDE, SODIUM LACTATE, POTASSIUM CHLORIDE, CALCIUM CHLORIDE 600; 310; 30; 20 MG/100ML; MG/100ML; MG/100ML; MG/100ML
INJECTION, SOLUTION INTRAVENOUS CONTINUOUS
Status: DISCONTINUED | OUTPATIENT
Start: 2024-10-15 | End: 2024-10-15 | Stop reason: HOSPADM

## 2024-10-15 RX ORDER — OXYCODONE HYDROCHLORIDE 5 MG/1
5 TABLET ORAL
Status: DISCONTINUED | OUTPATIENT
Start: 2024-10-15 | End: 2024-10-15 | Stop reason: HOSPADM

## 2024-10-15 RX ORDER — CEFAZOLIN SODIUM/WATER 2 G/20 ML
2 SYRINGE (ML) INTRAVENOUS SEE ADMIN INSTRUCTIONS
Status: DISCONTINUED | OUTPATIENT
Start: 2024-10-15 | End: 2024-10-15 | Stop reason: HOSPADM

## 2024-10-15 RX ORDER — OXYCODONE HYDROCHLORIDE 5 MG/1
10 TABLET ORAL
Status: DISCONTINUED | OUTPATIENT
Start: 2024-10-15 | End: 2024-10-15 | Stop reason: HOSPADM

## 2024-10-15 RX ORDER — DEXAMETHASONE SODIUM PHOSPHATE 10 MG/ML
INJECTION, SOLUTION INTRAMUSCULAR; INTRAVENOUS PRN
Status: DISCONTINUED | OUTPATIENT
Start: 2024-10-15 | End: 2024-10-15

## 2024-10-15 RX ORDER — DEXAMETHASONE SODIUM PHOSPHATE 10 MG/ML
4 INJECTION, SOLUTION INTRAMUSCULAR; INTRAVENOUS
Status: DISCONTINUED | OUTPATIENT
Start: 2024-10-15 | End: 2024-10-15 | Stop reason: HOSPADM

## 2024-10-15 RX ORDER — DEXMEDETOMIDINE HYDROCHLORIDE 4 UG/ML
INJECTION, SOLUTION INTRAVENOUS CONTINUOUS PRN
Status: DISCONTINUED | OUTPATIENT
Start: 2024-10-15 | End: 2024-10-15

## 2024-10-15 RX ORDER — NALOXONE HYDROCHLORIDE 0.4 MG/ML
0.1 INJECTION, SOLUTION INTRAMUSCULAR; INTRAVENOUS; SUBCUTANEOUS
Status: DISCONTINUED | OUTPATIENT
Start: 2024-10-15 | End: 2024-10-15 | Stop reason: HOSPADM

## 2024-10-15 RX ORDER — GLYCOPYRROLATE 0.2 MG/ML
INJECTION, SOLUTION INTRAMUSCULAR; INTRAVENOUS PRN
Status: DISCONTINUED | OUTPATIENT
Start: 2024-10-15 | End: 2024-10-15

## 2024-10-15 RX ORDER — FENTANYL CITRATE 50 UG/ML
INJECTION, SOLUTION INTRAMUSCULAR; INTRAVENOUS PRN
Status: DISCONTINUED | OUTPATIENT
Start: 2024-10-15 | End: 2024-10-15

## 2024-10-15 RX ORDER — FENTANYL CITRATE 50 UG/ML
25 INJECTION, SOLUTION INTRAMUSCULAR; INTRAVENOUS EVERY 5 MIN PRN
Status: DISCONTINUED | OUTPATIENT
Start: 2024-10-15 | End: 2024-10-15 | Stop reason: HOSPADM

## 2024-10-15 RX ORDER — DEXAMETHASONE SODIUM PHOSPHATE 4 MG/ML
4 INJECTION, SOLUTION INTRA-ARTICULAR; INTRALESIONAL; INTRAMUSCULAR; INTRAVENOUS; SOFT TISSUE
Status: DISCONTINUED | OUTPATIENT
Start: 2024-10-15 | End: 2024-10-15 | Stop reason: HOSPADM

## 2024-10-15 RX ORDER — FENTANYL CITRATE 50 UG/ML
25 INJECTION, SOLUTION INTRAMUSCULAR; INTRAVENOUS
Status: DISCONTINUED | OUTPATIENT
Start: 2024-10-15 | End: 2024-10-15 | Stop reason: HOSPADM

## 2024-10-15 RX ORDER — PROPOFOL 10 MG/ML
INJECTION, EMULSION INTRAVENOUS CONTINUOUS PRN
Status: DISCONTINUED | OUTPATIENT
Start: 2024-10-15 | End: 2024-10-15

## 2024-10-15 RX ADMIN — Medication 2 G: at 07:40

## 2024-10-15 RX ADMIN — KETAMINE HYDROCHLORIDE 20 MG: 10 INJECTION INTRAMUSCULAR; INTRAVENOUS at 08:05

## 2024-10-15 RX ADMIN — GLYCOPYRROLATE 0.2 MG: 0.2 INJECTION INTRAMUSCULAR; INTRAVENOUS at 08:44

## 2024-10-15 RX ADMIN — PROPOFOL 50 MCG/KG/MIN: 10 INJECTION, EMULSION INTRAVENOUS at 07:45

## 2024-10-15 RX ADMIN — GLYCOPYRROLATE 0.2 MG: 0.2 INJECTION INTRAMUSCULAR; INTRAVENOUS at 07:45

## 2024-10-15 RX ADMIN — PHENYLEPHRINE HYDROCHLORIDE 100 MCG: 10 INJECTION INTRAVENOUS at 08:15

## 2024-10-15 RX ADMIN — DEXAMETHASONE SODIUM PHOSPHATE 10 MG: 10 INJECTION, SOLUTION INTRAMUSCULAR; INTRAVENOUS at 07:45

## 2024-10-15 RX ADMIN — PHENYLEPHRINE HYDROCHLORIDE 100 MCG: 10 INJECTION INTRAVENOUS at 08:42

## 2024-10-15 RX ADMIN — FENTANYL CITRATE 50 MCG: 50 INJECTION INTRAMUSCULAR; INTRAVENOUS at 07:50

## 2024-10-15 RX ADMIN — PHENYLEPHRINE HYDROCHLORIDE 100 MCG: 10 INJECTION INTRAVENOUS at 08:51

## 2024-10-15 RX ADMIN — SODIUM CHLORIDE, POTASSIUM CHLORIDE, SODIUM LACTATE AND CALCIUM CHLORIDE: 600; 310; 30; 20 INJECTION, SOLUTION INTRAVENOUS at 07:40

## 2024-10-15 RX ADMIN — PHENYLEPHRINE HYDROCHLORIDE 100 MCG: 10 INJECTION INTRAVENOUS at 09:00

## 2024-10-15 RX ADMIN — PHENYLEPHRINE HYDROCHLORIDE 100 MCG: 10 INJECTION INTRAVENOUS at 08:57

## 2024-10-15 RX ADMIN — ACETAMINOPHEN 975 MG: 325 TABLET ORAL at 06:53

## 2024-10-15 RX ADMIN — PHENYLEPHRINE HYDROCHLORIDE 100 MCG: 10 INJECTION INTRAVENOUS at 08:23

## 2024-10-15 RX ADMIN — KETAMINE HYDROCHLORIDE 30 MG: 10 INJECTION INTRAMUSCULAR; INTRAVENOUS at 07:45

## 2024-10-15 RX ADMIN — FENTANYL CITRATE 50 MCG: 50 INJECTION INTRAMUSCULAR; INTRAVENOUS at 08:00

## 2024-10-15 RX ADMIN — PHENYLEPHRINE HYDROCHLORIDE 100 MCG: 10 INJECTION INTRAVENOUS at 08:35

## 2024-10-15 RX ADMIN — DEXMEDETOMIDINE HYDROCHLORIDE 0.4 MCG/KG/HR: 400 INJECTION INTRAVENOUS at 07:55

## 2024-10-15 RX ADMIN — DEXMEDETOMIDINE HYDROCHLORIDE 20 MCG: 100 INJECTION, SOLUTION INTRAVENOUS at 07:45

## 2024-10-15 RX ADMIN — PHENYLEPHRINE HYDROCHLORIDE 100 MCG: 10 INJECTION INTRAVENOUS at 08:26

## 2024-10-15 ASSESSMENT — ACTIVITIES OF DAILY LIVING (ADL)
ADLS_ACUITY_SCORE: 35
ADLS_ACUITY_SCORE: 18

## 2024-10-15 NOTE — ANESTHESIA POSTPROCEDURE EVALUATION
Patient: Juvenal Blake    Procedure: Procedure(s):  HERNIORRHAPHY, INGUINAL, OPEN       Anesthesia Type:  General    Note:  Disposition: Outpatient   Postop Pain Control: Uneventful            Sign Out: Well controlled pain   PONV: No   Neuro/Psych: Uneventful            Sign Out: Acceptable/Baseline neuro status   Airway/Respiratory: Uneventful            Sign Out: Acceptable/Baseline resp. status   CV/Hemodynamics: Uneventful            Sign Out: Acceptable CV status; No obvious hypovolemia; No obvious fluid overload   Other NRE: NONE   DID A NON-ROUTINE EVENT OCCUR? No           Last vitals:  Vitals Value Taken Time   /65 10/15/24 1002   Temp 37.1  C (98.7  F) 10/15/24 1005   Pulse 63 10/15/24 1004   Resp 7 10/15/24 1003   SpO2 94 % 10/15/24 1004   Vitals shown include unfiled device data.    Electronically Signed By: Jessica Chua MD  October 15, 2024  3:43 PM

## 2024-10-15 NOTE — OP NOTE
General Surgery Operative Note    Date of Surgery: 10/15/24      Surgeon: Levar Grant MD    Assistant: None    Preoperative Diagnosis: Incarcerated left inguinal hernia    Postoperative Diagnosis: Incarcerated left inguinal hernia    Procedure: Open left inguinal herniorrhaphy with mesh    EBL: 10 cc    Findings: Incarcerated indirect inguinal hernia    Indications:  Patient is a 59-year-old man admitted with an incarcerated inguinal hernia yesterday.  He was discharged as his pain was controlled and it was partially reducible and he returns today for semielective repair.  Discussed the details of the operation and the risks of bleeding, infection, chronic groin pain as well as recurrence.  The patient consents.    Details of operation:  Patient is brought to the operating room and laid on the table in the supine position.  MAC anesthesia was initiated without incident.  The patient was prepped and draped in usual sterile fashion.  A timeout for safety was performed.    I began by injecting local anesthetic in a line between the left ASIS and pubic tubercle.  I began incision in this area and was working my way down through Nadeem's fascia.  Patient was really moving way too much for this operation and so LMA general anesthesia was initiated.  With the patient now sedated the case was much simpler.  I went through Nadeem's fascia till I encountered the aponeurosis of the external oblique.  Local anesthetic was injected below and then I opened it sharply.  The ilioinguinal nerve was identified and resected.  I encircled the hernia contents and cord structures medially.  The patient's hernia remained incarcerated.  I finally got all the way around it and then was able to fully reduce the contents of the hernia.  I then dissected the hernia sac off of the cord structures.  The hernia sac was opened and all the contents fully reduced.  There is no evidence of any bowel necrosis or ischemia.  I used a 2-0 Vicryl  suture as a stick tie near the base of the hernia sac and the top portion was excised.  The stump of the hernia sac was reduced back in the abdomen.  I then closed the external ring somewhat using a 2-0 Vicryl suture simply to aid in mesh placement.  I then used a left sided anatomic ProGrip mesh that was trimmed to size and laid in place in the left inguinal canal.  It was secured medially with a 2-0 PDS suture.  I then closed the aponeurosis of the external oblique using a 2-0 Vicryl suture and Nadeem's fascia's and a 3-0 Vicryl suture.  The skin was closed with 4-0 Monocryl and Dermabond as a dressing.  The testicle was pulled back further down into the scrotum after the case.  The patient taught the procedure well.  There were no immediately apparent complications.    Levar Grant MD  General Surgeon  [facility]  Surgery [address] [address]?  Office: [phone]

## 2024-10-15 NOTE — INTERVAL H&P NOTE
"I have reviewed the surgical (or preoperative) H&P that is linked to this encounter, and examined the patient. There are no significant changes    Clinical Conditions Present on Arrival:  Clinically Significant Risk Factors Present on Admission                  # Thrombocytopenia: Lowest platelets = 100 in last 2 days, will monitor for bleeding      # Obesity: Estimated body mass index is 35.87 kg/m  as calculated from the following:    Height as of 10/13/24: 1.702 m (5' 7\").    Weight as of this encounter: 103.9 kg (229 lb).       "

## 2024-10-15 NOTE — ANESTHESIA PROCEDURE NOTES
Airway    Staff -        CRNA: Julia Pablo APRN CRNA       Performed By: CRNA  Consent for Airway        Urgency: elective  Indications and Patient Condition       Indications for airway management: irene-procedural       Induction type:intravenous       Mask difficulty assessment: 0 - not attempted    Final Airway Details       Final airway type: supraglottic airway    Supraglottic Airway Details        Type: LMA       Brand: Ambu AuraGain       LMA size: 4    Post intubation assessment        Placement verified by: capnometry, equal breath sounds and chest rise        Number of attempts at approach: 1       Number of other approaches attempted: 0       Secured with: ties       Ease of procedure: easy       Dentition: Intact

## 2024-10-15 NOTE — ANESTHESIA PREPROCEDURE EVALUATION
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     Non Hodgkin's lymphoma (H)     Non Hodgkin's lymphoma (H)     Osteopenia     Prostate CA (H)     Shortness of breath     Sleep apnea     CPAP    Thrombocytopenia (H)       Past Surgical History:   Procedure Laterality Date    ABDOMEN SURGERY      for pyloric stenosis as an infant    COLONOSCOPY      CYSTOSCOPY, BIOPSY BLADDER, COMBINED N/A 8/15/2024    Procedure: CYSTOSCOPY WITH BLADDER BIOPSY AND BRASHER PLACEMENT;  Surgeon: Shai Issa MD;  Location: [facility]    CYSTOSCOPY, FULGURATE BLADDER TUMOR, COMBINED N/A 8/15/2024    Procedure: AND FULGURATION;  Surgeon: Shai Issa MD;  Location: [facility]    DISTAL CLAVICLE EXCISION      per H & P     HERNIA REPAIR      inguinal    HERNIA REPAIR, UMBILICAL      INSERT PICC LINE N/A 2021    Procedure: INSERTION, PICC EXCHANGE, PREVIOUS PICC IS OUT 10 CENTIMETERS;  Surgeon: Christiano Murdock MD;  [place] [facility] [place]    IR CHEST PORT PLACEMENT > 5 YRS OF AGE  2017    IR LYMPH NODE BIOPSY  10/01/2020    IR LYMPH NODE BIOPSY  2021    IR LYMPH NODE BIOPSY  2021    IR PICC PLACEMENT > 5 YRS OF AGE  2021    LAPAROSCOPIC HERNIORRHAPHY INCISIONAL Right 2020    Procedure: HERNIORRHAPHY, UMBILICAL, LAPAROSCOPIC; AXILLARY LYMPH NODE BIOPSY;  Surgeon: Rob Farrell MD;  Location: [facility];  Service: General    ORTHOPEDIC SURGERY      OTHER SURGICAL HISTORY Left     shoulder arthroscopy  "   OTHER SURGICAL HISTORY  2017    port a cath placement    PICC DOUBLE LUMEN PLACEMENT Right 2021    47cm (3cm external), Basilic vein    PICC DOUBLE LUMEN PLACEMENT Left 2022    46 cm 5 fr dl Bard PICC    UT LAP,PROSTATECTOMY,RADICAL,W/NERVE SPARE,INCL ROBOTIC N/A 11/15/2017    Procedure: ROBOTIC ASSISTED RADICAL RETROPUBIC PROSTATECTOMY BILATERAL PELVIC LYMPH NODE DISSECTION ;  Surgeon: Ezio Steele MD;  Location: [facility];  Service: Urology    TONSILLECTOMY      US LYMPH NODE BIOPSY  2019      Allergies   Allergen Reactions    Rituxan [Rituximab] Shortness Of Breath     Wife states \"seizure-like symptoms\"    Dilaudid [Hydromorphone] Rash     Patient developed urticaria following dilaudid administration    Levaquin [Levofloxacin]      Significant tendon pain    Ondansetron Itching      Social History     Tobacco Use    Smoking status: Former     Current packs/day: 0.00     Types: Cigarettes     Quit date: 1995     Years since quittin.3     Passive exposure: Past    Smokeless tobacco: Never   Substance Use Topics    Alcohol use: Yes     Comment: 1-2 a day,occ more      Wt Readings from Last 1 Encounters:   10/15/24 103.9 kg (229 lb)        Anesthesia Evaluation   Pt has had prior anesthetic.     No history of anesthetic complications       ROS/MED HX  ENT/Pulmonary:     (+) sleep apnea (inspira device),                                       Neurologic:  - neg neurologic ROS     Cardiovascular:     (+)  - -   -  - -           SAMS.                           METS/Exercise Tolerance:     Hematologic: Comments: thrombocytopenia    (+)      anemia,          Musculoskeletal:   (+)  arthritis,             GI/Hepatic:     (+) GERD,                   Renal/Genitourinary:  - neg Renal ROS     Endo:     (+)               Obesity,       Psychiatric/Substance Use:  - neg psychiatric ROS     Infectious Disease:       Malignancy:   (+) Malignancy (last chemo 3 years ago), " "History of Lymphoma/Leukemia.    Other:            Physical Exam    Airway        Mallampati: III   TM distance: > 3 FB   Neck ROM: full   Mouth opening: > 3 cm    Respiratory Devices and Support         Dental       (+) Multiple visibly decayed, broken teeth    B=Bridge, C=Chipped, L=Loose, M=Missing    Cardiovascular          Rhythm and rate: regular     Pulmonary           breath sounds clear to auscultation           OUTSIDE LABS:  CBC:   Lab Results   Component Value Date    WBC 4.3 10/13/2024    WBC 5.1 10/03/2024    HGB 11.7 (L) 10/13/2024    HGB 13.5 10/03/2024    HCT 33.4 (L) 10/13/2024    HCT 38.2 (L) 10/03/2024     (L) 10/13/2024     (L) 10/03/2024     BMP:   Lab Results   Component Value Date     10/13/2024     10/03/2024    POTASSIUM 4.2 10/13/2024    POTASSIUM 4.0 10/03/2024    CHLORIDE 106 10/13/2024    CHLORIDE 103 10/03/2024    CO2 24 10/13/2024    CO2 23 10/03/2024    BUN 11.6 10/13/2024    BUN 12.2 10/03/2024    CR 0.98 10/13/2024    CR 0.99 10/03/2024     (H) 10/13/2024     (H) 10/03/2024     COAGS:   Lab Results   Component Value Date    PTT 29 02/17/2022    INR 0.96 02/17/2022    FIBR 308 02/17/2022     POC: No results found for: \"BGM\", \"HCG\", \"HCGS\"  HEPATIC:   Lab Results   Component Value Date    ALBUMIN 4.5 07/05/2024    PROTTOTAL 6.6 07/05/2024    ALT 24 07/05/2024    AST 26 07/05/2024    ALKPHOS 85 07/05/2024    BILITOTAL 0.6 07/05/2024     OTHER:   Lab Results   Component Value Date    LACT 1.1 10/13/2024    TRE 9.0 10/13/2024    PHOS 3.4 08/02/2023    MAG 2.0 08/02/2023    CRP <2.9 02/17/2022       Anesthesia Plan    ASA Status:  3    NPO Status:  NPO Appropriate    Anesthesia Type: MAC.      Maintenance: TIVA.   Techniques and Equipment:       - Drips/Meds: Ketamine     Consents    Anesthesia Plan(s) and associated risks, benefits, and realistic alternatives discussed. Questions answered and patient/representative(s) expressed understanding.     " "- Discussed:     - Discussed with:  Patient            Postoperative Care    Pain management: Multi-modal analgesia.   PONV prophylaxis: Dexamethasone or Solumedrol     Comments:    Other Comments:     No zofran  Propofol gtt  Robinul  Lidocaine  Zofran, decadron 4 mg  LMA prn  Toradol if ok with surgeon - 15 mg                 Jessica Chua MD               # Thrombocytopenia: Lowest platelets = 100 in last 2 days, will monitor for bleeding           # Obesity: Estimated body mass index is 35.87 kg/m  as calculated from the following:    Height as of 10/13/24: 1.702 m (5' 7\").    Weight as of this encounter: 103.9 kg (229 lb).             "

## 2024-10-15 NOTE — ANESTHESIA CARE TRANSFER NOTE
Patient: Juvenal Blake    Procedure: Procedure(s):  HERNIORRHAPHY, INGUINAL, OPEN       Diagnosis: Left inguinal hernia [K40.90]  Diagnosis Additional Information: No value filed.    Anesthesia Type:   General     Note:    Oropharynx: oropharynx clear of all foreign objects and spontaneously breathing  Level of Consciousness: awake  Oxygen Supplementation: face mask  Level of Supplemental Oxygen (L/min / FiO2): 8  Independent Airway: airway patency satisfactory and stable  Dentition: dentition unchanged  Vital Signs Stable: post-procedure vital signs reviewed and stable  Report to RN Given: handoff report given  Patient transferred to: PACU    Handoff Report: Identifed the Patient, Identified the Reponsible Provider, Reviewed the pertinent medical history, Discussed the surgical course, Reviewed Intra-OP anesthesia mangement and issues during anesthesia, Set expectations for post-procedure period and Allowed opportunity for questions and acknowledgement of understanding      Vitals:  Vitals Value Taken Time   /57 10/15/24 0927   Temp 36.5 10/15/24 0927   Pulse 64 10/15/24 0927   Resp 17 10/15/24 0927   SpO2 97 % 10/15/24 0927   Vitals shown include unfiled device data.    Electronically Signed By: ALESSANDRO Saha CRNA  October 15, 2024  9:28 AM

## 2024-10-18 ENCOUNTER — LAB (OUTPATIENT)
Dept: INFUSION THERAPY | Facility: HOSPITAL | Age: 59
End: 2024-10-18
Payer: COMMERCIAL

## 2024-10-18 ENCOUNTER — HOME INFUSION (OUTPATIENT)
Dept: HOME HEALTH SERVICES | Facility: HOME HEALTH | Age: 59
End: 2024-10-18
Payer: COMMERCIAL

## 2024-10-18 DIAGNOSIS — C82.08 FOLLICULAR LYMPHOMA GRADE I, LYMPH NODES OF MULTIPLE SITES (H): ICD-10-CM

## 2024-10-18 DIAGNOSIS — D80.1 HYPOGAMMAGLOBULINEMIA (H): ICD-10-CM

## 2024-10-18 DIAGNOSIS — C83.398 DIFFUSE LARGE B-CELL LYMPHOMA OF EXTRANODAL SITE EXCLUDING SPLEEN AND OTHER SOLID ORGANS: Primary | ICD-10-CM

## 2024-10-18 PROCEDURE — 250N000011 HC RX IP 250 OP 636: Performed by: INTERNAL MEDICINE

## 2024-10-18 PROCEDURE — 82784 ASSAY IGA/IGD/IGG/IGM EACH: CPT

## 2024-10-18 PROCEDURE — 36591 DRAW BLOOD OFF VENOUS DEVICE: CPT

## 2024-10-18 RX ORDER — HEPARIN SODIUM (PORCINE) LOCK FLUSH IV SOLN 100 UNIT/ML 100 UNIT/ML
5 SOLUTION INTRAVENOUS
Status: DISCONTINUED | OUTPATIENT
Start: 2024-10-18 | End: 2024-10-18 | Stop reason: HOSPADM

## 2024-10-18 RX ORDER — HEPARIN SODIUM (PORCINE) LOCK FLUSH IV SOLN 100 UNIT/ML 100 UNIT/ML
5 SOLUTION INTRAVENOUS
OUTPATIENT
Start: 2024-10-18

## 2024-10-18 RX ADMIN — Medication 5 ML: at 11:36

## 2024-10-21 LAB — IGG SERPL-MCNC: 427 MG/DL (ref 610–1616)

## 2024-11-08 ENCOUNTER — ONCOLOGY VISIT (OUTPATIENT)
Dept: ONCOLOGY | Facility: HOSPITAL | Age: 59
End: 2024-11-08
Attending: INTERNAL MEDICINE
Payer: COMMERCIAL

## 2024-11-08 VITALS
DIASTOLIC BLOOD PRESSURE: 70 MMHG | WEIGHT: 227.6 LBS | OXYGEN SATURATION: 98 % | HEART RATE: 61 BPM | SYSTOLIC BLOOD PRESSURE: 123 MMHG | RESPIRATION RATE: 16 BRPM | HEIGHT: 67 IN | BODY MASS INDEX: 35.72 KG/M2 | TEMPERATURE: 97.7 F

## 2024-11-08 DIAGNOSIS — D80.6 IMMUNODEFICIENCY DISORDER DUE TO ANTIBODY DEFICIENCY (H): ICD-10-CM

## 2024-11-08 DIAGNOSIS — C82.08 FOLLICULAR LYMPHOMA GRADE I, LYMPH NODES OF MULTIPLE SITES (H): Primary | ICD-10-CM

## 2024-11-08 PROCEDURE — G2211 COMPLEX E/M VISIT ADD ON: HCPCS | Mod: FS | Performed by: INTERNAL MEDICINE

## 2024-11-08 PROCEDURE — G0463 HOSPITAL OUTPT CLINIC VISIT: HCPCS | Performed by: INTERNAL MEDICINE

## 2024-11-08 PROCEDURE — 99214 OFFICE O/P EST MOD 30 MIN: CPT | Mod: FS | Performed by: INTERNAL MEDICINE

## 2024-11-08 ASSESSMENT — PAIN SCALES - GENERAL: PAINLEVEL_OUTOF10: NO PAIN (0)

## 2024-11-08 NOTE — LETTER
11/8/2024      Juvenal Blake  [address] [place] [address]      Dear Colleague,    Thank you for referring your patient, Juvenal Blake, to the [facility] [place]. Please see a copy of my visit note below.    [facility] Hematology and Oncology Progress Note    Patient: Juvenal Blake  MRN: [id number]  Date of Service: Nov 8, 2024      Reason for Visit    Chief Complaint   Patient presents with     Oncology Clinic Visit     Return visit, review labs of 10/18, related to Follicular lymphoma grade i, lymph nodes of multiple sites       Assessment and Plan     Cancer Staging   No matching staging information was found for the patient.    Follicular Lymphoma.   Initially diagnosed in 2010. 4th relapse was May 2021 treated with CAR-T therapy last done in January 2022. Currently on surveillance.  Denies any B symptoms.  Recent CT chest abdomen pelvis 10/13/2024 showed no recurrent lymphadenopathy.  Recent CBC 10/13/2024 showed normal white count with mildly low hemoglobin of 11.7.  Platelet count is stable at 100.  Will plan on 6-month follow-ups with annual CTs to continue to monitor.    Recurrent prostate cancer  PET January 2024 showed evidence of recurrence. Started enzalutamide and relugolix in February 2024.  Per the urology clinic notes his PSA on April 8, 2024 was undetectable. Side effects are being managed by urology.  He is on Megace and mirabegron.     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  IgG levels will be checked monthly at the clinic here.  He gets IVIG with [place] home infusion.  Last given in September.  Will have him come back in 2 weeks for lab only and continue to follow with monthly labs after.     Prophylaxis:   He is still on acyclovir which we will continue for now.  CD4 counts continue to be low so he will remain on Bactrim.     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  Follicular lymphoma: Diagnosed in April, 2010 . Stage IV diagnosis with bone marrow involvement.  Initial disease involved the cervical, supraclavicular, axillary, mesenteric mass, retroperitoneal nodes, and possibly the manubrium.  Relapse noted on imaging in December 2016.  Axillary node biopsy from February 2017 shows low-grade follicle center cell lymphoma.  Second relapse on imaging January 2019.  Repeat biopsy January 7, 2019 of a right medial thigh lymph node shows recurrent follicular lymphoma.  Grade 2.  Third relapse on imaging in May, 2020. Pathologic confirmation May 28, 2020 from a right axillary lymph node excisional biopsy.  Fourth relapse May 2021     2.  Prostate cancer:  Pathologic stage T3b prostate cancer.  Positive margins, multiple, and surgery.  3 lymph nodes were negative.  High risk for recurrent disease.  He is being followed at [facility].  Maricel 4+3 = 7.  Tertiary Maricel pattern 5 and preoperative PSA of 27.      Radiographic recurrence seen on PET scan from  January 2024.  This showed uptake located between the rectum and lower postsurgical changes with an SUV of 10.6, measuring 22 x 15 mm.  Second area of uptake in the right retroperitoneum with a maximum SUV of 15.  Measures 13 x 16 cm.  He also had PSA worsening with a value of 5.46 in December 2023.  In December 2022 was 1.58.     3.  Hypogammaglobulinemia: He has received intermittent doses of IVIG.     Treatment:     Lymphoma:    He had 6 cycles of bendamustine and Rituxan completed in October, 2010.  He had 2 years of maintenance Rituxan therapy finishing in September, 2012.     Second course of bendamustine plus rituximab started February 20, 2017.  Cycle 6 was completed on July 11, 2017.  Then had maintenance rituximab through January, 2019.     O-CHOP started at second relapse.  Cycle 1 given April 23, 2019.  Cycle 6 given August 16, 2019.  Haplo NAM-NK therapy at [facility] September 1, 2020     Iidelalisib started October 21, 2020 at  [facility].  150 mg twice daily.       (6/28/21)  polatuzumab (12/21/21)      Axicabtagene ciloleucel (Yescarta) given 1/17/22.        Prostate:   Initial radical prostatectomy and bilateral lymph node dissection.  November 15, 2017.  Prostatic adenocarcinoma [place] 4+3 = 7 with tertiary pattern 5.  Tumor involves 45% of total surface area.  Positive for extensive extraprostatic extension.  Positive for bilateral seminal vesicle invasion.  Multiple margins positive.  Lymphovascular invasion not identified.  Perineural invasion was noted. Lymph nodes negative. 3 were examined (2 right obturator and 1 left obturator).     Completed radiation to the prostate fossa and pelvic lymph nodes at [facility] early May 2018.  He received 4500 cGy in 25 fractions.  He then had 2340 cGy boost in 13 fractions to the prostatic fossa, for a total dose of 6240 cGy in 38 treatment fractions delivered over 54 days.  He did not receive hormonal therapy.     Relugolix and enzalutamide  "started for recurrent/metastatic disease in February 2024.    History of Present Illness    Juvenal is here for 6-month follow-up    He is feeling overall well.  Reports a small cold over a week ago that has resolved.  Denies fevers, chills, coughs.  No new lumps or bumps.  He recently had hernia surgery 2 weeks ago for bilateral inguinal medial hernias.  He is recovering well.  Continues to have low appetite.  Denies drenching night sweats or worsening fatigue.  Continues to get hot flashes on his prostate cancer treatment.  He denies any worsening shortness of breath or leg swelling.  He continues on acyclovir and Bactrim.  He has quit smoking, however he reports smoking weed every day.      Review of Systems    Pertinent items are noted in HPI.    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  Smells of smoke.  HEENT: Head: Normal, normocephalic, atraumatic.  Chest: Normal chest wall and respirations. Clear to auscultation.  Cardiac: Regular rate and rhythm  Abdomen: Soft, nondistended  Extremities: No lower extremity edema  Skin: No rashes or lesions  CNS: Grossly nonfocal  Lymphatics: No cervical, " supraclavicular, or axillary lymphadenopathy.       Lab Results    No results found for this or any previous visit (from the past week).    Imaging    CT Abdomen Pelvis w Contrast    Result Date: 10/13/2024  EXAM: CT ABDOMEN PELVIS W CONTRAST LOCATION: [facility] DATE: 10/13/2024 INDICATION: Left inguinal hernia pain. COMPARISON: PET/CT 1/17/2024. TECHNIQUE: CT scan of the abdomen and pelvis was performed following injection of IV contrast. Multiplanar reformats were obtained. Dose reduction techniques were used. CONTRAST: Isovue 370 90 ml. FINDINGS: LOWER CHEST: Mild bibasilar atelectasis. Stable left lower lobe nodule measuring 6 mm series 3 image 45 when compared to an older CT from 8/13/2018. Another stable small nodule noted at the left lower lobe image 15. Given the stability, these are consistent with benign nodules. HEPATOBILIARY: No acute liver abnormality. Gallbladder appears unremarkable. PANCREAS: Stable hazy opacity about the pancreas. Homogeneous enhancement at the pancreas parenchyma. No focal fluid collection identified. A few mildly prominent adjacent lymph nodes. SPLEEN: Normal. ADRENAL GLANDS: Normal. KIDNEYS/BLADDER: No significant mass, stones, or hydronephrosis. There are simple or benign cysts. No follow up is needed. Bladder appears unremarkable. BOWEL: herniated distal descending colon versus proximal sigmoid within a left inguinal hernia measuring 5.6 x 4.7 x 7.6 cm series 3 image 219. There is no bowel obstruction at this time. No focal fluid collection. Small fluid also noted within the hernia. Normal appendix. LYMPH NODES: Haziness of the fat at the mesentery and extending about the pancreas again identified appearing stable. Several stable prominent lymph nodes. Left para-aortic example to be 13 mm series 3 image 98. Another region that is stable is near the left renal vein image 89. VASCULATURE: Scattered calcifications. No acute abnormality. PELVIC ORGANS:  Prostatectomy. Small pelvic fluid. Small nodule between the bladder and anorectal level is approximately 9 mm series 3 image 208. MUSCULOSKELETAL: Spine degenerative changes. No focal bone lesion identified.     IMPRESSION: 1.  Left inguinal canal hernia containing herniated descending/proximal sigmoid colon is identified. No bowel obstruction at this time. There is small fluid within the hernia sac. 2.  Overall stable appearance of hazy opacities at the mesentery, extending about the pancreas, and retroperitoneum. Stable mildly prominent several lymph nodes that are indeterminate. 3.  A small nodule at the prostatectomy operative bed is noted and is indeterminate. It lies between the posterior midline bladder and anorectal level.     The longitudinal plan of care for the diagnosis(es)/condition(s) as documented were addressed during this visit. Due to the added complexity in care, I will continue to support Juvenal in the subsequent management and with ongoing continuity of care.    Signed by: Katiuska Bernal PA-C    Attestation signed by Rob Crooks MD at 11/10/2024  6:53 PM:      Physician Attestation    I saw and evaluated Juvenal Blake as part of a shared APRN/PA visit.     I personally reviewed the labs.    I personally provided a substantive portion of care for this patient and I approve the care plan as written by the NAYA.  I was involved with Medical Decision Making including: Please see A&P for additional details of medical decision making.    Rob Crooks MD  Date of Service (when I saw the patient): 11/10/24    Oncology Rooming Note    November 8, 2024 3:35 PM   Juvenal Blake is a 59 year old male who presents for:    Chief Complaint   Patient presents with     Oncology Clinic Visit     Return visit, review labs of 10/18, related to Follicular lymphoma grade i, lymph nodes of multiple sites     Initial Vitals: /70 (BP Location: Left arm, Patient Position: Sitting, Cuff Size: Adult  "Regular)   Pulse 61   Temp 97.7  F (36.5  C) (Tympanic)   Resp 16   Ht 1.702 m (5' 7\")   Wt 103.2 kg (227 lb 9.6 oz)   SpO2 98%   BMI 35.65 kg/m   Estimated body mass index is 35.65 kg/m  as calculated from the following:    Height as of this encounter: 1.702 m (5' 7\").    Weight as of this encounter: 103.2 kg (227 lb 9.6 oz). Body surface area is 2.21 meters squared.  No Pain (0) Comment: Data Unavailable   No LMP for male patient.  Allergies reviewed: Yes  Medications reviewed: Yes    Medications: Medication refills not needed today.  Pharmacy name entered into [employer]: [id number] - [place], [place] - [address] AT [facility] [address]    Frailty Screening:   Is the patient here for a new oncology consult visit in cancer care? 2. No      Clinical concerns: No concerns.   Dr. Crooks was notified.      Arleth Mckee CMA                Again, thank you for allowing me to participate in the care of your patient.        Sincerely,        Rob Crooks MD  "

## 2024-11-08 NOTE — PROGRESS NOTES
[facility] Hematology and Oncology Progress Note    Patient: Juvenal Blake  MRN: [id number]  Date of Service: Nov 8, 2024      Reason for Visit    Chief Complaint   Patient presents with    Oncology Clinic Visit     Return visit, review labs of 10/18, related to Follicular lymphoma grade i, lymph nodes of multiple sites       Assessment and Plan     Cancer Staging   No matching staging information was found for the patient.    Follicular Lymphoma.   Initially diagnosed in 2010. 4th relapse was May 2021 treated with CAR-T therapy last done in January 2022. Currently on surveillance.  Denies any B symptoms.  Recent CT chest abdomen pelvis 10/13/2024 showed no recurrent lymphadenopathy.  Recent CBC 10/13/2024 showed normal white count with mildly low hemoglobin of 11.7.  Platelet count is stable at 100.  Will plan on 6-month follow-ups with annual CTs to continue to monitor.    Recurrent prostate cancer  PET January 2024 showed evidence of recurrence. Started enzalutamide and relugolix in February 2024.  Per the urology clinic notes his PSA on April 8, 2024 was undetectable. Side effects are being managed by urology.  He is on Megace and mirabegron.     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  IgG levels will be checked monthly at the clinic here.  He gets IVIG with [facility] home infusion.  Last given in September.  Will have him come back in 2 weeks for lab only and continue to follow with monthly labs after.     Prophylaxis:   He is still on acyclovir which we will continue for now.  CD4 counts continue to be low so he will remain on Bactrim.     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  Follicular lymphoma: Diagnosed in April, 2010 . Stage IV diagnosis with bone marrow involvement.  Initial disease involved the cervical, supraclavicular, axillary, mesenteric mass, retroperitoneal nodes, and possibly the manubrium.  Relapse noted on imaging in December 2016.  Axillary node biopsy from February 2017 shows low-grade follicle center cell lymphoma.  Second relapse on imaging January 2019.  Repeat biopsy January 7, 2019 of a right medial thigh lymph node shows recurrent follicular lymphoma.  Grade 2.  Third relapse on imaging in May, 2020. Pathologic confirmation May 28, 2020 from a right axillary lymph node excisional biopsy.  Fourth relapse May 2021     2.  Prostate cancer:  Pathologic stage T3b prostate cancer.  Positive margins, multiple, and surgery.  3 lymph nodes were negative.  High risk for recurrent disease.  He is being followed at [facility].  Mar[place] 4+3 = 7.  Tertiary Maricel pattern 5 and preoperative PSA of 27.      Radiographic recurrence seen on PET scan from January 2024.  This showed uptake located between the rectum and lower postsurgical changes with an SUV of 10.6, measuring 22 x 15 mm.  Second area of uptake in the right retroperitoneum with a maximum SUV of 15.  Measures 13 x 16 cm.  He also had PSA worsening with a value  of 5.46 in December 2023.  In December 2022 was 1.58.     3.  Hypogammaglobulinemia: He has received intermittent doses of IVIG.     Treatment:     Lymphoma:    He had 6 cycles of bendamustine and Rituxan completed in October, 2010.  He had 2 years of maintenance Rituxan therapy finishing in September, 2012.     Second course of bendamustine plus rituximab started February 20, 2017.  Cycle 6 was completed on July 11, 2017.  Then had maintenance rituximab through January, 2019.     O-CHOP started at second relapse.  Cycle 1 given April 23, 2019.  Cycle 6 given August 16, 2019.  [facility]     [facility]  150 mg twice daily.       (6/28/21)  polatuzumab (12/21/21)      Axicabtagene ciloleucel (Yescarta) given 1/17/22.        Prostate:   Initial radical prostatectomy and bilateral lymph node dissection.  November 15, 2017.  Prostatic adenocarcinoma [place] 4+3 = 7 with tertiary pattern 5.  Tumor involves 45% of total surface area.  Positive for extensive extraprostatic extension.  Positive for bilateral seminal vesicle invasion.  Multiple margins positive.  Lymphovascular invasion not identified.  Perineural invasion was noted. Lymph nodes negative. 3 were examined (2 right obturator and 1 left obturator).     Completed radiation to the prostate fossa and pelvic lymph nodes at [facility] early May 2018.  He received 4500 cGy in 25 fractions.  He then had 2340 cGy boost in 13 fractions to the prostatic fossa, for a total dose of 6240 cGy in 38 treatment fractions delivered over 54 days.  He did not receive hormonal therapy.     Relugolix and enzalutamide started for recurrent/metastatic disease in February 2024.    History of Present Illness    Juvenal is here for 6-month follow-up    He is feeling overall well.  Reports a small cold over a week ago that has resolved.  Denies fevers, chills, coughs.  No new lumps or bumps.  He  "recently had hernia surgery 2 weeks ago for bilateral inguinal medial hernias.  He is recovering well.  Continues to have low appetite.  Denies drenching night sweats or worsening fatigue.  Continues to get hot flashes on his prostate cancer treatment.  He denies any worsening shortness of breath or leg swelling.  He continues on acyclovir and Bactrim.  He has quit smoking, however he reports smoking weed every day.      Review of Systems    Pertinent items are noted in HPI.    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  Smells of smoke.  HEENT: Head: Normal, normocephalic, atraumatic.  Chest: Normal chest wall and respirations. Clear to auscultation.  Cardiac: Regular rate and rhythm  Abdomen: Soft, nondistended  Extremities: No lower extremity edema  Skin: No rashes or lesions  CNS: Grossly nonfocal  Lymphatics: No cervical, supraclavicular, or axillary lymphadenopathy.       Lab Results    No results found for this or any previous visit (from the past week).    Imaging    CT Abdomen Pelvis w Contrast    Result Date: 10/13/2024  EXAM: CT ABDOMEN PELVIS W CONTRAST LOCATION: [facility] DATE: " 10/13/2024 INDICATION: Left inguinal hernia pain. COMPARISON: PET/CT 1/17/2024. TECHNIQUE: CT scan of the abdomen and pelvis was performed following injection of IV contrast. Multiplanar reformats were obtained. Dose reduction techniques were used. CONTRAST: Isovue 370 90 ml. FINDINGS: LOWER CHEST: Mild bibasilar atelectasis. Stable left lower lobe nodule measuring 6 mm series 3 image 45 when compared to an older CT from 8/13/2018. Another stable small nodule noted at the left lower lobe image 15. Given the stability, these are consistent with benign nodules. HEPATOBILIARY: No acute liver abnormality. Gallbladder appears unremarkable. PANCREAS: Stable hazy opacity about the pancreas. Homogeneous enhancement at the pancreas parenchyma. No focal fluid collection identified. A few mildly prominent adjacent lymph nodes. SPLEEN: Normal. ADRENAL GLANDS: Normal. KIDNEYS/BLADDER: No significant mass, stones, or hydronephrosis. There are simple or benign cysts. No follow up is needed. Bladder appears unremarkable. BOWEL: herniated distal descending colon versus proximal sigmoid within a left inguinal hernia measuring 5.6 x 4.7 x 7.6 cm series 3 image 219. There is no bowel obstruction at this time. No focal fluid collection. Small fluid also noted within the hernia. Normal appendix. LYMPH NODES: Haziness of the fat at the mesentery and extending about the pancreas again identified appearing stable. Several stable prominent lymph nodes. Left para-aortic example to be 13 mm series 3 image 98. Another region that is stable is near the left renal vein image 89. VASCULATURE: Scattered calcifications. No acute abnormality. PELVIC ORGANS: Prostatectomy. Small pelvic fluid. Small nodule between the bladder and anorectal level is approximately 9 mm series 3 image 208. MUSCULOSKELETAL: Spine degenerative changes. No focal bone lesion identified.     IMPRESSION: 1.  Left inguinal canal hernia containing herniated descending/proximal  sigmoid colon is identified. No bowel obstruction at this time. There is small fluid within the hernia sac. 2.  Overall stable appearance of hazy opacities at the mesentery, extending about the pancreas, and retroperitoneum. Stable mildly prominent several lymph nodes that are indeterminate. 3.  A small nodule at the prostatectomy operative bed is noted and is indeterminate. It lies between the posterior midline bladder and anorectal level.     The longitudinal plan of care for the diagnosis(es)/condition(s) as documented were addressed during this visit. Due to the added complexity in care, I will continue to support Juvenal in the subsequent management and with ongoing continuity of care.    Signed by: Katiuska Bernal PA-C

## 2024-11-08 NOTE — PROGRESS NOTES
"Oncology Rooming Note    November 8, 2024 3:35 PM   Juvenal Blake is a 59 year old male who presents for:    Chief Complaint   Patient presents with    Oncology Clinic Visit     Return visit, review labs of 10/18, related to Follicular lymphoma grade i, lymph nodes of multiple sites     Initial Vitals: /70 (BP Location: Left arm, Patient Position: Sitting, Cuff Size: Adult Regular)   Pulse 61   Temp 97.7  F (36.5  C) (Tympanic)   Resp 16   Ht 1.702 m (5' 7\")   Wt 103.2 kg (227 lb 9.6 oz)   SpO2 98%   BMI 35.65 kg/m   Estimated body mass index is 35.65 kg/m  as calculated from the following:    Height as of this encounter: 1.702 m (5' 7\").    Weight as of this encounter: 103.2 kg (227 lb 9.6 oz). Body surface area is 2.21 meters squared.  No Pain (0) Comment: Data Unavailable   No LMP for male patient.  Allergies reviewed: Yes  Medications reviewed: Yes    Medications: Medication refills not needed today.  Pharmacy name entered into [employer]: [id number] - [place], [place] - [address] [place] [facility] [place] [address]    Frailty Screening:   Is the patient here for a new oncology consult visit in cancer care? 2. No      Clinical concerns: No concerns.   Dr. Crooks was notified.      Arleth Mckee CMA              "

## 2024-11-10 ENCOUNTER — HOME INFUSION (OUTPATIENT)
Dept: HOME HEALTH SERVICES | Facility: HOME HEALTH | Age: 59
End: 2024-11-10
Payer: COMMERCIAL

## 2024-11-10 DIAGNOSIS — D80.1 HYPOGAMMAGLOBULINEMIA (H): Primary | ICD-10-CM

## 2024-11-18 ENCOUNTER — VIRTUAL VISIT (OUTPATIENT)
Dept: SURGERY | Facility: CLINIC | Age: 59
End: 2024-11-18
Payer: COMMERCIAL

## 2024-11-18 DIAGNOSIS — Z48.89 POSTOPERATIVE VISIT: Primary | ICD-10-CM

## 2024-11-18 NOTE — PROGRESS NOTES
"Telemedicine Visit: The patient's condition can be safely assessed and treated via telephone telemedicine encounter.      Reason for Telemedicine Visit: Patient has requested telehealth visit    Originating Site (Patient Location): Patient's home    Distant Site (Provider Location): [facility]    Consent:  The patient/guardian has verbally consented to: the potential risks and benefits of telemedicine (video visit) versus in person care; bill my insurance or make self-payment for services provided; and responsibility for payment of non-covered services.     Mode of Communication: telephone    As the provider I attest to compliance with applicable laws and regulations related to telemedicine.     HPI: Patient is s/p open left inguinal hernia repair with mesh with Dr. Grant on 10/15/24.  He is doing well. Pain is well controlled: Yes. No difficulties with the surgical wound.  He is eating and drinking well. Denies fever, chills, nausea, vomiting. Bowel function has returned to normal. Patient reports that his penis seems to be \"retracted\" into the scrotum a bit since surgery. Denies any dysuria, hematuria or changes to his urination. Also does not feel that he has scrotal swelling but it is \"hard to tell.\" He reports that he follows with urology for his prostate cancer and has follow-up scheduled sometime next month.     Assessment/Plan: Doing well after surgery. Suspect scrotal edema and discussed scrotal elevation with a rolled wash cloth or hand towel placed under his scrotum. He does not have any signs or symptoms of a urinary tract infection. Recommend he follow-up with his urologist as scheduled. He should follow up with general surgery as needed.    Length of visit: Spent 10 minutes reviewing symptoms and post-op care.    Rox Andrade PA-C  [facility]  Surgery [address]  [place] [address]?  Office: [phone]   "

## 2024-11-26 RX ORDER — HEPARIN SODIUM (PORCINE) LOCK FLUSH IV SOLN 100 UNIT/ML 100 UNIT/ML
5 SOLUTION INTRAVENOUS
Qty: 25 ML | Refills: 0 | Status: DISPENSED | OUTPATIENT
Start: 2024-11-26 | End: 2025-03-17

## 2024-11-26 RX ORDER — HUMAN IMMUNOGLOBULIN G 5 G/50ML
5 LIQUID INTRAVENOUS
Qty: 1 ML | Refills: 0 | Status: DISPENSED | OUTPATIENT
Start: 2024-11-26 | End: 2025-11-26

## 2024-11-26 RX ORDER — HEPARIN SODIUM (PORCINE) LOCK FLUSH IV SOLN 100 UNIT/ML 100 UNIT/ML
5 SOLUTION INTRAVENOUS
Qty: 25 ML | Refills: 0 | Status: ACTIVE | OUTPATIENT
Start: 2024-11-26 | End: 2024-11-26

## 2024-11-26 RX ORDER — DIPHENHYDRAMINE HCL 25 MG
50 CAPSULE ORAL
Qty: 24 CAPSULE | Refills: 0 | Status: DISPENSED | OUTPATIENT
Start: 2024-11-26 | End: 2025-11-26

## 2024-11-26 RX ORDER — DIPHENHYDRAMINE HCL 25 MG
50 CAPSULE ORAL
Qty: 24 CAPSULE | Refills: 0 | Status: ACTIVE | OUTPATIENT
Start: 2024-11-26 | End: 2024-11-26

## 2024-11-26 RX ORDER — ACETAMINOPHEN 325 MG/1
650 TABLET ORAL
Qty: 24 TABLET | Refills: 0 | Status: DISPENSED | OUTPATIENT
Start: 2024-11-26 | End: 2025-11-26

## 2024-11-26 RX ORDER — SODIUM CHLORIDE 9 MG/ML
500 INJECTION, SOLUTION INTRAVENOUS PRN
Qty: 999999 ML | Refills: 0 | Status: ACTIVE | OUTPATIENT
Start: 2024-11-26 | End: 2025-11-26

## 2024-11-26 RX ORDER — EPINEPHRINE 0.3 MG/.3ML
0.3 INJECTION SUBCUTANEOUS PRN
Qty: 999999 ML | Refills: 0 | Status: ACTIVE | OUTPATIENT
Start: 2024-11-26 | End: 2025-11-26

## 2024-11-26 RX ORDER — HUMAN IMMUNOGLOBULIN G 20 G/200ML
20 LIQUID INTRAVENOUS
Qty: 1 ML | Refills: 0 | Status: DISPENSED | OUTPATIENT
Start: 2024-11-26 | End: 2025-11-26

## 2024-11-26 RX ORDER — ACETAMINOPHEN 325 MG/1
650 TABLET ORAL
Qty: 24 TABLET | Refills: 0 | Status: ACTIVE | OUTPATIENT
Start: 2024-11-26 | End: 2024-11-26

## 2024-11-26 RX ORDER — HUMAN IMMUNOGLOBULIN G 10 G/100ML
10 LIQUID INTRAVENOUS
Qty: 1 ML | Refills: 0 | Status: DISPENSED | OUTPATIENT
Start: 2024-11-26 | End: 2025-11-26

## 2024-11-26 RX ORDER — DIPHENHYDRAMINE HYDROCHLORIDE 50 MG/ML
50 INJECTION INTRAMUSCULAR; INTRAVENOUS PRN
Qty: 99999 ML | Refills: 0 | Status: ACTIVE | OUTPATIENT
Start: 2024-11-26 | End: 2025-11-26

## 2024-11-27 ENCOUNTER — HOME INFUSION BILLING (OUTPATIENT)
Dept: HOME HEALTH SERVICES | Facility: HOME HEALTH | Age: 59
End: 2024-11-27
Payer: COMMERCIAL

## 2024-11-27 PROCEDURE — E1399 DURABLE MEDICAL EQUIPMENT MI: HCPCS

## 2024-11-27 PROCEDURE — A4215 STERILE NEEDLE: HCPCS

## 2024-11-27 PROCEDURE — E0781 EXTERNAL AMBULATORY INFUS PU: HCPCS | Mod: RR

## 2024-12-27 PROCEDURE — E0781 EXTERNAL AMBULATORY INFUS PU: HCPCS | Mod: RR

## 2025-01-06 ENCOUNTER — LAB (OUTPATIENT)
Dept: INFUSION THERAPY | Facility: HOSPITAL | Age: 60
End: 2025-01-06
Payer: COMMERCIAL

## 2025-01-06 DIAGNOSIS — D80.1 HYPOGAMMAGLOBULINEMIA: ICD-10-CM

## 2025-01-06 PROCEDURE — 36591 DRAW BLOOD OFF VENOUS DEVICE: CPT

## 2025-01-06 PROCEDURE — 82784 ASSAY IGA/IGD/IGG/IGM EACH: CPT

## 2025-01-07 LAB — IGG SERPL-MCNC: 402 MG/DL (ref 610–1616)

## 2025-01-08 ENCOUNTER — PATIENT OUTREACH (OUTPATIENT)
Dept: ONCOLOGY | Facility: HOSPITAL | Age: 60
End: 2025-01-08
Payer: COMMERCIAL

## 2025-01-08 NOTE — PROGRESS NOTES
[facility]: Cancer Care                                                                                          Sent patient a [employer] Academy[employer] message:    Ashish Sanford,    Your IGG level is 402. Per Dr. Crooks, you only need IVIG when your IGG level is below 400. Please set up some monthly lab appointments at our clinic at [phone].    Signature:  Lexi Martin RN

## 2025-01-13 ENCOUNTER — HOME INFUSION BILLING (OUTPATIENT)
Dept: HOME HEALTH SERVICES | Facility: HOME HEALTH | Age: 60
End: 2025-01-13
Payer: COMMERCIAL

## 2025-01-13 PROCEDURE — A4215 STERILE NEEDLE: HCPCS

## 2025-01-13 PROCEDURE — E1399 DURABLE MEDICAL EQUIPMENT MI: HCPCS

## 2025-01-14 ENCOUNTER — HOME CARE VISIT (OUTPATIENT)
Dept: HOME HEALTH SERVICES | Facility: HOME HEALTH | Age: 60
End: 2025-01-14
Payer: COMMERCIAL

## 2025-01-14 VITALS
TEMPERATURE: 97.5 F | WEIGHT: 235 LBS | DIASTOLIC BLOOD PRESSURE: 80 MMHG | RESPIRATION RATE: 16 BRPM | HEART RATE: 69 BPM | OXYGEN SATURATION: 97 % | SYSTOLIC BLOOD PRESSURE: 134 MMHG | BODY MASS INDEX: 36.81 KG/M2

## 2025-01-14 NOTE — PROGRESS NOTES
"Infusion Nursing Note:  Skilled nurse visit today for PAC, Privigen 35g infusion, port deaccess  for Juvenal Blake.   present during visit today: Not Applicable.    Pre-infusion Checklist:   Pre-infusion Checklist    Have you had any delayed reaction since last infusion?   No    Have you recently had an elevated temperature, fever, chills productive cough, coughing for 3 weeks or longer or hemoptysis, abnormal vital signs, night sweats, chest pain, or have you noticed a decrease in your appetite, or noted unexplained weight loss or fatigue?   No    Do you have any open wounds or new incisions?  No    Do you have any recent or upcoming hospitalizations, surgeries, or dental procedures?   No    Do you currently have or recently have had any signs of illness or infection or are you on any antibiotics?   No    Have you had any new, sudden , or worsening abdominal pain?   No    Have you or anyone in your household received a live vaccination in the past 4 weeks?   No    Have you recently been diagnosed with any new nervous system diseases or cancer diagnosis? (i.e., Multiple Sclerosis, Guillain Somers, sezures, neurological changes) Are you receiving any form of radiation or chemotherapy?   No    Are you pregnant or breastfeeding, or do you have plans of pregnancy in the future?   No    Have you been having any signs of worsening depression or suicidal ideation?  No    Have there been any other new onset medical symptoms?  No    Did the patient answer \"YES\" to any of the questions above?  No    Will the patient receive a medication that has an order for infusion reaction management?  Yes, and all drugs and supplies are available and none have .    If ordered, has the patient taken pre-medications?  Yes    Plan:   Therapy is appropriate, will proceed with treatment.     Chemotherapy Treatment Conditions:   Not Applicable    Intravenous Access:  Implanted Port.    Post Infusion Assessment:  Patient " tolerated infusion without incident.     Note:   Saline administered (in mLs): 30ml    Next Visit Plan:   TBD following next month lab draw. If IgG level less than 400 pt will need Privigen infusion next month mid Feb.      Isha Hoff RN 1/14/2025

## 2025-01-27 PROCEDURE — E0781 EXTERNAL AMBULATORY INFUS PU: HCPCS | Mod: RR

## 2025-01-30 ENCOUNTER — HOME INFUSION (OUTPATIENT)
Dept: HOME HEALTH SERVICES | Facility: HOME HEALTH | Age: 60
End: 2025-01-30

## 2025-01-30 DIAGNOSIS — D80.1 HYPOGAMMAGLOBULINEMIA: ICD-10-CM

## 2025-02-18 ENCOUNTER — LAB (OUTPATIENT)
Dept: LAB | Facility: CLINIC | Age: 60
End: 2025-02-18
Payer: COMMERCIAL

## 2025-02-18 DIAGNOSIS — D80.1 HYPOGAMMAGLOBULINEMIA: ICD-10-CM

## 2025-02-18 PROCEDURE — 82784 ASSAY IGA/IGD/IGG/IGM EACH: CPT

## 2025-02-18 PROCEDURE — 36415 COLL VENOUS BLD VENIPUNCTURE: CPT

## 2025-02-19 LAB — IGG SERPL-MCNC: 461 MG/DL (ref 610–1616)

## 2025-03-10 ENCOUNTER — LAB (OUTPATIENT)
Dept: LAB | Facility: CLINIC | Age: 60
End: 2025-03-10
Payer: COMMERCIAL

## 2025-03-10 ENCOUNTER — INFUSION THERAPY VISIT (OUTPATIENT)
Dept: INFUSION THERAPY | Facility: HOSPITAL | Age: 60
End: 2025-03-10
Attending: INTERNAL MEDICINE
Payer: COMMERCIAL

## 2025-03-10 DIAGNOSIS — C83.398 DIFFUSE LARGE B-CELL LYMPHOMA OF EXTRANODAL SITE EXCLUDING SPLEEN AND OTHER SOLID ORGANS: Primary | ICD-10-CM

## 2025-03-10 DIAGNOSIS — D80.1 HYPOGAMMAGLOBULINEMIA: ICD-10-CM

## 2025-03-10 DIAGNOSIS — C82.08 FOLLICULAR LYMPHOMA GRADE I, LYMPH NODES OF MULTIPLE SITES (H): ICD-10-CM

## 2025-03-10 PROCEDURE — 250N000011 HC RX IP 250 OP 636: Performed by: INTERNAL MEDICINE

## 2025-03-10 PROCEDURE — 82784 ASSAY IGA/IGD/IGG/IGM EACH: CPT

## 2025-03-10 PROCEDURE — 96523 IRRIG DRUG DELIVERY DEVICE: CPT

## 2025-03-10 PROCEDURE — 36415 COLL VENOUS BLD VENIPUNCTURE: CPT

## 2025-03-10 RX ORDER — HEPARIN SODIUM (PORCINE) LOCK FLUSH IV SOLN 100 UNIT/ML 100 UNIT/ML
5 SOLUTION INTRAVENOUS
Status: DISCONTINUED | OUTPATIENT
Start: 2025-03-10 | End: 2025-03-10 | Stop reason: HOSPADM

## 2025-03-10 RX ORDER — HEPARIN SODIUM (PORCINE) LOCK FLUSH IV SOLN 100 UNIT/ML 100 UNIT/ML
5 SOLUTION INTRAVENOUS
OUTPATIENT
Start: 2025-03-10

## 2025-03-10 RX ADMIN — HEPARIN 5 ML: 100 SYRINGE at 09:59

## 2025-03-11 ENCOUNTER — TELEPHONE (OUTPATIENT)
Dept: HOME HEALTH SERVICES | Facility: HOME HEALTH | Age: 60
End: 2025-03-11
Payer: COMMERCIAL

## 2025-03-11 LAB — IGG SERPL-MCNC: 361 MG/DL (ref 610–1616)

## 2025-03-11 RX ORDER — EPINEPHRINE 0.3 MG/.3ML
0.3 INJECTION SUBCUTANEOUS PRN
Qty: 999999 ML | Refills: 0 | Status: ACTIVE | OUTPATIENT
Start: 2025-03-11 | End: 2026-03-11

## 2025-03-11 RX ORDER — ACETAMINOPHEN 325 MG/1
650 TABLET ORAL
Qty: 999999 TABLET | Refills: 0 | Status: DISPENSED | OUTPATIENT
Start: 2025-03-11 | End: 2026-03-11

## 2025-03-11 RX ORDER — HUMAN IMMUNOGLOBULIN G 10 G/100ML
10 LIQUID INTRAVENOUS
Qty: 100 ML | Refills: 0 | Status: DISPENSED | OUTPATIENT
Start: 2025-03-11 | End: 2026-03-11

## 2025-03-11 RX ORDER — HUMAN IMMUNOGLOBULIN G 20 G/200ML
20 LIQUID INTRAVENOUS
Qty: 200 ML | Refills: 0 | Status: DISPENSED | OUTPATIENT
Start: 2025-03-11 | End: 2026-03-11

## 2025-03-11 RX ORDER — SODIUM CHLORIDE 9 MG/ML
500 INJECTION, SOLUTION INTRAVENOUS PRN
Qty: 999999 ML | Refills: 0 | Status: ACTIVE | OUTPATIENT
Start: 2025-03-11 | End: 2026-03-11

## 2025-03-11 RX ORDER — DIPHENHYDRAMINE HCL 25 MG
50 CAPSULE ORAL
Qty: 999999 CAPSULE | Refills: 0 | Status: DISPENSED | OUTPATIENT
Start: 2025-03-11 | End: 2026-03-11

## 2025-03-11 RX ORDER — HUMAN IMMUNOGLOBULIN G 5 G/50ML
5 LIQUID INTRAVENOUS
Qty: 1 ML | Refills: 0 | Status: DISPENSED | OUTPATIENT
Start: 2025-03-11 | End: 2026-03-11

## 2025-03-11 RX ORDER — DIPHENHYDRAMINE HYDROCHLORIDE 50 MG/ML
50 INJECTION, SOLUTION INTRAMUSCULAR; INTRAVENOUS PRN
Qty: 99999 ML | Refills: 0 | Status: ACTIVE | OUTPATIENT
Start: 2025-03-11 | End: 2026-03-11

## 2025-03-11 NOTE — TELEPHONE ENCOUNTER
Received email communication from TARA Lizama RPH to schedule pt for IVIG, they got orders.     L/M for pt to call back. Generic vm and could not leave detailed message.

## 2025-03-12 ENCOUNTER — HOME INFUSION BILLING (OUTPATIENT)
Dept: HOME HEALTH SERVICES | Facility: HOME HEALTH | Age: 60
End: 2025-03-12
Payer: COMMERCIAL

## 2025-03-12 PROCEDURE — A4215 STERILE NEEDLE: HCPCS

## 2025-03-12 PROCEDURE — E1399 DURABLE MEDICAL EQUIPMENT MI: HCPCS

## 2025-03-13 ENCOUNTER — HOME CARE VISIT (OUTPATIENT)
Dept: HOME HEALTH SERVICES | Facility: HOME HEALTH | Age: 60
End: 2025-03-13
Payer: COMMERCIAL

## 2025-03-13 VITALS
OXYGEN SATURATION: 99 % | BODY MASS INDEX: 36.81 KG/M2 | HEART RATE: 66 BPM | DIASTOLIC BLOOD PRESSURE: 80 MMHG | SYSTOLIC BLOOD PRESSURE: 140 MMHG | RESPIRATION RATE: 16 BRPM | TEMPERATURE: 97.5 F | WEIGHT: 235.01 LBS

## 2025-03-13 PROCEDURE — E0781 EXTERNAL AMBULATORY INFUS PU: HCPCS | Mod: RR

## 2025-03-13 RX ORDER — HEPARIN SODIUM (PORCINE) LOCK FLUSH IV SOLN 100 UNIT/ML 100 UNIT/ML
5 SOLUTION INTRAVENOUS
Qty: 99999 ML | Refills: 0 | Status: ACTIVE | OUTPATIENT
Start: 2025-03-13 | End: 2025-09-16

## 2025-03-13 NOTE — PROGRESS NOTES
Nursing Visit Note:  Nurse visit today for Privigen Infusion  for Juvenal Blake.     present during visit today: Not Applicable.    Intravenous Access:  Implanted Port.    Infusion Nursing Note:    Pre-infusion Checklist:   Have you had any delayed reaction since last infusion?   No     Have you recently had an elevated temperature, fever, chills productive cough, coughing for 3 weeks or longer or hemoptysis, abnormal vital signs, night sweats, chest pain, or have you noticed a decrease in your appetite, or noted unexplained weight loss or fatigue?   No     Do you have any open wounds or new incisions?  No     Do you have any recent or upcoming hospitalizations, surgeries, or dental procedures? Does not include esophagogastroduodenoscopy, colonoscopy, endoscopic retrograde cholangiopancreatography (ERCP), endoscopic ultrasound (EUS), dental procedures or joint aspiration/steroid injections.   No     Do you currently have or recently have had any signs of illness or infection or are you on any antibiotics?   No     Have you had any new, sudden, or worsening abdominal pain?   No     Have you or anyone in your household received a live vaccination in the past 4 weeks?   No     Have you recently been diagnosed with any new nervous system diseases or cancer diagnosis? (i.e., Multiple Sclerosis, Guillain Lou[place], seizures, neurological changes) Are you receiving any form of radiation or chemotherapy?   No     Are you pregnant or breastfeeding, or do you have plans of pregnancy in the future?   No     Have you been having any signs of worsening depression or suicidal ideation?  No     Have you had any other new onset medical symptoms?  No    Entyvio/Ocrevus/Tyasabri only: Have you been having any new or worsening medical problems such as issues with thinking, eyesight, balance or strength that have persisted over several days?   N/A    Benlysta only: Have you been having any signs of worsening depression or  "suicidal ideations?    N/A    IVIG only: Have you had any new blood clots?  No    Did the patient answer \"YES\" to any of the questions above?  No     Will the patient receive a medication that has an order for infusion reaction management?  Yes, and all drugs and supplies are available and none have .     If ordered, has the patient taken pre-medications?  Yes and redosed at 3 hours.     Plan:   Therapy is appropriate, will proceed with treatment.     Post Infusion Assessment:  Patient experienced chest tightness, body aching and restlessness suddenly during infusion while at 150ml/hr, patient reports he experiences this everytime he gets a Privigen infusion. Infusion paused for 5 minutes and symptoms resolved, restarted at 125ml/hr and no further incident, ordering MD Dr. Crooks, and Pharmacy blue team notified.   Blood return noted pre and post infusion.  No evidence of extravasations.  Access discontinued per protocol.  Biologic Infusion Post Education: Call the triage nurse at your clinic or seek medical attention if you have chills and/or temperature greater than or equal to 100.5, uncontrolled nausea/vomiting, diarrhea, constipation, dizziness, shortness of breath, chest pain, heart palpitations, weakness or any other new or concerning symptoms, questions or concerns.  You cannot have any live virus vaccines prior to or during treatment or up to 6 months post infusion.  If you have an upcoming surgery, medical procedure or dental procedure during treatment, this should be discussed with your ordering physician and your surgeon/dentist.  If you are having any concerning symptom, if you are unsure if you should get your next infusion or wish to speak to a provider before your next infusion, please call your care coordinator or triage nurse at your clinic to notify them so we can adequately serve you.     Note: rate decreased from 150ml/hr to 125ml/hr due to chest tightness and restlessness, tolerated this " rate without further incident.     Saline administered: 40 (ml), 5ml heparin flush given per orders prior to deaccess.     Supply Check:   Does the patient have all the supplies they need for the next visit?  Yes    Next visit plan: Next visit will be scheduled as needed based on IgG lab levels.    Pauline Palm RN 3/13/2025

## 2025-03-15 ENCOUNTER — HEALTH MAINTENANCE LETTER (OUTPATIENT)
Age: 60
End: 2025-03-15

## 2025-03-19 ENCOUNTER — LAB REQUISITION (OUTPATIENT)
Dept: LAB | Facility: CLINIC | Age: 60
End: 2025-03-19
Payer: COMMERCIAL

## 2025-03-19 DIAGNOSIS — R05.1 ACUTE COUGH: ICD-10-CM

## 2025-03-19 LAB
FLUAV RNA SPEC QL NAA+PROBE: NEGATIVE
FLUBV RNA RESP QL NAA+PROBE: NEGATIVE
RSV RNA SPEC NAA+PROBE: NEGATIVE
SARS-COV-2 RNA RESP QL NAA+PROBE: NEGATIVE

## 2025-03-19 PROCEDURE — 87637 SARSCOV2&INF A&B&RSV AMP PRB: CPT | Mod: ORL | Performed by: FAMILY MEDICINE

## 2025-03-25 ENCOUNTER — EPISODE UPDATE (OUTPATIENT)
Dept: HOME HEALTH SERVICES | Facility: HOME HEALTH | Age: 60
End: 2025-03-25
Payer: COMMERCIAL

## 2025-03-28 ENCOUNTER — LAB REQUISITION (OUTPATIENT)
Dept: LAB | Facility: CLINIC | Age: 60
End: 2025-03-28
Payer: COMMERCIAL

## 2025-03-28 PROCEDURE — 87081 CULTURE SCREEN ONLY: CPT | Mod: ORL

## 2025-03-30 LAB — BACTERIA SPEC CULT: NORMAL

## 2025-04-01 ENCOUNTER — EPISODE UPDATE (OUTPATIENT)
Dept: HOME HEALTH SERVICES | Facility: HOME HEALTH | Age: 60
End: 2025-04-01
Payer: COMMERCIAL

## 2025-04-01 PROBLEM — T78.2XXA: Status: ACTIVE | Noted: 2021-06-24

## 2025-04-07 ENCOUNTER — LAB (OUTPATIENT)
Dept: LAB | Facility: CLINIC | Age: 60
End: 2025-04-07
Payer: COMMERCIAL

## 2025-04-07 DIAGNOSIS — D80.1 HYPOGAMMAGLOBULINEMIA: ICD-10-CM

## 2025-04-07 PROCEDURE — 36415 COLL VENOUS BLD VENIPUNCTURE: CPT

## 2025-04-07 PROCEDURE — 82784 ASSAY IGA/IGD/IGG/IGM EACH: CPT

## 2025-04-08 LAB — IGG SERPL-MCNC: 461 MG/DL (ref 610–1616)

## 2025-04-16 ENCOUNTER — LAB REQUISITION (OUTPATIENT)
Dept: LAB | Facility: CLINIC | Age: 60
End: 2025-04-16
Payer: COMMERCIAL

## 2025-04-16 DIAGNOSIS — J98.8 OTHER SPECIFIED RESPIRATORY DISORDERS: ICD-10-CM

## 2025-04-16 PROCEDURE — 87798 DETECT AGENT NOS DNA AMP: CPT | Mod: ORL | Performed by: FAMILY MEDICINE

## 2025-04-16 PROCEDURE — 87081 CULTURE SCREEN ONLY: CPT | Mod: ORL | Performed by: FAMILY MEDICINE

## 2025-04-17 ENCOUNTER — PATIENT OUTREACH (OUTPATIENT)
Dept: ONCOLOGY | Facility: HOSPITAL | Age: 60
End: 2025-04-17
Payer: COMMERCIAL

## 2025-04-17 LAB
B PARAPERT DNA SPEC QL NAA+PROBE: NOT DETECTED
B PERT DNA SPEC QL NAA+PROBE: NOT DETECTED
BACTERIA SPEC CULT: NORMAL

## 2025-04-17 NOTE — PROGRESS NOTES
[facility]: Cancer Care                                                                                          Writer sent an IB message to the [facility] (Blue) pool:    Hello,    This patient has not been feeling well and had requested that the IgG level threshold get changed from 400 to 500. I reviewed the patient's request with Dr. Crooks and per Dr. Crooks, the patient would benefit from an IVIG infusion dose right now and moving forward, the IgG feel threshold for receiving IVIG infusions should be anything under 500 instead of 400. Please reach out to the patient to get him set up with an IVIG infusion. Please let me know if you have any questions or concerns.    Signature:  Lexi Martin RN

## 2025-04-18 LAB — BACTERIA SPEC CULT: ABNORMAL

## 2025-04-21 ENCOUNTER — HOME INFUSION BILLING (OUTPATIENT)
Dept: HOME HEALTH SERVICES | Facility: HOME HEALTH | Age: 60
End: 2025-04-21
Payer: COMMERCIAL

## 2025-04-21 PROCEDURE — E1399 DURABLE MEDICAL EQUIPMENT MI: HCPCS

## 2025-04-21 PROCEDURE — A4215 STERILE NEEDLE: HCPCS

## 2025-04-23 ENCOUNTER — TELEPHONE (OUTPATIENT)
Dept: ONCOLOGY | Facility: HOSPITAL | Age: 60
End: 2025-04-23
Payer: COMMERCIAL

## 2025-04-23 ENCOUNTER — HOME CARE VISIT (OUTPATIENT)
Dept: HOME HEALTH SERVICES | Facility: HOME HEALTH | Age: 60
End: 2025-04-23
Payer: COMMERCIAL

## 2025-04-23 PROCEDURE — E0781 EXTERNAL AMBULATORY INFUS PU: HCPCS | Mod: RR

## 2025-04-23 NOTE — TELEPHONE ENCOUNTER
Received call from Yeimy - Nurse with [facility]. She was calling to check on whether or not Juvenal can still get his infusion today if he is on antibiotics for strep throat.  She can be reached at [phone]    Per Dr. Crooks, he can still get his infusion. When writer called Yeimy to let her know, she states that Dr. Crooks had already responded to her message from earlier this morning from before she called the triage line.         Katiuska Benoit RN on 4/23/2025 at 10:42 AM

## 2025-04-23 NOTE — PROGRESS NOTES
Nursing Visit Note:  Nurse visit today for Privigen for Juvenal MCGRAW Gabrielkenneyrachele.     present during visit today: Not Applicable.    Intravenous Access:  Implanted Port.    Infusion Nursing Note:    Pre-infusion Checklist:   Have you had any delayed reaction since last infusion?   No     Have you recently had an elevated temperature, fever, chills productive cough, coughing for 3 weeks or longer or hemoptysis, abnormal vital signs, night sweats, chest pain, or have you noticed a decrease in your appetite, or noted unexplained weight loss or fatigue?   Yes, strep throat     Do you have any open wounds or new incisions?  No     Do you have any recent or upcoming hospitalizations, surgeries, or dental procedures? Does not include esophagogastroduodenoscopy, colonoscopy, endoscopic retrograde cholangiopancreatography (ERCP), endoscopic ultrasound (EUS), dental procedures or joint aspiration/steroid injections.   No     Do you currently have or recently have had any signs of illness or infection or are you on any antibiotics?   Yes, strep throat and taking antibiotics      Have you had any new, sudden, or worsening abdominal pain?   No     Have you or anyone in your household received a live vaccination in the past 4 weeks?   No     Have you recently been diagnosed with any new nervous system diseases or cancer diagnosis? (i.e., Multiple Sclerosis, Guillain [place], seizures, neurological changes) Are you receiving any form of radiation or chemotherapy?   No     Are you pregnant or breastfeeding, or do you have plans of pregnancy in the future?   No     Have you been having any signs of worsening depression or suicidal ideation?  No     Have you had any other new onset medical symptoms?  No    Entyvio/Ocrevus/Tyasabri only: Have you been having any new or worsening medical problems such as issues with thinking, eyesight, balance or strength that have persisted over several days?   N/A    Benlysta only: Have you been  "having any signs of worsening depression or suicidal ideations?    N/A    IVIG only: Have you had any new blood clots?  No    Did the patient answer \"YES\" to any of the questions above?  Yes, hold the infusion and call the provider:  called Dr Crooks and he gave order to continue with infusion today .     Will the patient receive a medication that has an order for infusion reaction management?  Yes, and all drugs and supplies are available and none have .     If ordered, has the patient taken pre-medications?  Yes    Plan:   Therapy is appropriate, will proceed with treatment.     Post Infusion Assessment:  Patient tolerated infusion without incident.  Biologic Infusion Post Education: Call the triage nurse at your clinic or seek medical attention if you have chills and/or temperature greater than or equal to 100.5, uncontrolled nausea/vomiting, diarrhea, constipation, dizziness, shortness of breath, chest pain, heart palpitations, weakness or any other new or concerning symptoms, questions or concerns.  You cannot have any live virus vaccines prior to or during treatment or up to 6 months post infusion.  If you have an upcoming surgery, medical procedure or dental procedure during treatment, this should be discussed with your ordering physician and your surgeon/dentist.  If you are having any concerning symptom, if you are unsure if you should get your next infusion or wish to speak to a provider before your next infusion, please call your care coordinator or triage nurse at your clinic to notify them so we can adequately serve you.     Note: tolerated infusion very well today. premedicated with Benadryl and Tylenol. VSS throughout     Saline administered: 20 mls (ml)    Supply Check:   Does the patient have all the supplies they need for the next visit?  Yes    Next visit plan: next infusion based in IgG level at next lab draw     Kinga Russell 2025  "

## 2025-04-24 VITALS
SYSTOLIC BLOOD PRESSURE: 120 MMHG | HEART RATE: 70 BPM | TEMPERATURE: 97.8 F | DIASTOLIC BLOOD PRESSURE: 70 MMHG | OXYGEN SATURATION: 99 % | RESPIRATION RATE: 16 BRPM

## 2025-05-07 ENCOUNTER — LAB (OUTPATIENT)
Dept: LAB | Facility: CLINIC | Age: 60
End: 2025-05-07
Payer: COMMERCIAL

## 2025-05-07 DIAGNOSIS — D80.1 HYPOGAMMAGLOBULINEMIA: ICD-10-CM

## 2025-05-07 PROCEDURE — 82784 ASSAY IGA/IGD/IGG/IGM EACH: CPT

## 2025-05-07 PROCEDURE — 36415 COLL VENOUS BLD VENIPUNCTURE: CPT

## 2025-05-08 LAB — IGG SERPL-MCNC: 597 MG/DL (ref 610–1616)

## 2025-05-19 ENCOUNTER — LAB REQUISITION (OUTPATIENT)
Dept: LAB | Facility: CLINIC | Age: 60
End: 2025-05-19
Payer: COMMERCIAL

## 2025-05-19 DIAGNOSIS — J98.8 OTHER SPECIFIED RESPIRATORY DISORDERS: ICD-10-CM

## 2025-05-19 DIAGNOSIS — J98.9 RESPIRATORY DISORDER, UNSPECIFIED: ICD-10-CM

## 2025-05-19 LAB
C PNEUM DNA SPEC QL NAA+PROBE: NOT DETECTED
FLUAV H1 2009 PAND RNA SPEC QL NAA+PROBE: NOT DETECTED
FLUAV H1 RNA SPEC QL NAA+PROBE: NOT DETECTED
FLUAV H3 RNA SPEC QL NAA+PROBE: NOT DETECTED
FLUAV RNA SPEC QL NAA+PROBE: NOT DETECTED
FLUBV RNA SPEC QL NAA+PROBE: NOT DETECTED
HADV DNA SPEC QL NAA+PROBE: NOT DETECTED
HCOV PNL SPEC NAA+PROBE: NOT DETECTED
HMPV RNA SPEC QL NAA+PROBE: NOT DETECTED
HPIV1 RNA SPEC QL NAA+PROBE: NOT DETECTED
HPIV2 RNA SPEC QL NAA+PROBE: NOT DETECTED
HPIV3 RNA SPEC QL NAA+PROBE: NOT DETECTED
HPIV4 RNA SPEC QL NAA+PROBE: NOT DETECTED
M PNEUMO DNA SPEC QL NAA+PROBE: NOT DETECTED
RSV RNA SPEC QL NAA+PROBE: NOT DETECTED
RSV RNA SPEC QL NAA+PROBE: NOT DETECTED
RV+EV RNA SPEC QL NAA+PROBE: NOT DETECTED

## 2025-05-19 PROCEDURE — 87081 CULTURE SCREEN ONLY: CPT | Mod: ORL | Performed by: FAMILY MEDICINE

## 2025-05-19 PROCEDURE — 87633 RESP VIRUS 12-25 TARGETS: CPT | Mod: ORL | Performed by: FAMILY MEDICINE

## 2025-05-22 DIAGNOSIS — C82.00 FOLLICULAR LYMPHOMA GRADE I, UNSPECIFIED BODY REGION (H): ICD-10-CM

## 2025-05-22 DIAGNOSIS — D72.818 LOW CD4 CELL COUNT DETERMINED BY FLOW CYTOMETRY: ICD-10-CM

## 2025-05-22 DIAGNOSIS — D72.818 LOW CD4 CELL COUNT DETERMINED BY FLOW CYTOMETRY: Primary | ICD-10-CM

## 2025-05-22 LAB — BACTERIA SPEC CULT: ABNORMAL

## 2025-05-22 RX ORDER — SULFAMETHOXAZOLE AND TRIMETHOPRIM 800; 160 MG/1; MG/1
TABLET ORAL
Qty: 50 TABLET | Refills: 3 | Status: SHIPPED | OUTPATIENT
Start: 2025-05-22

## 2025-05-26 NOTE — PROGRESS NOTES
[facility] Hematology and Oncology Progress Note    Patient: Juvenal Blake  MRN: [id number]  Date of Service: May 27, 2025      Oncologist: Dr. Crooks    Reason for Visit    Chief Complaint   Patient presents with    Oncology Clinic Visit     Follicular lymphoma grade i, lymph nodes of multiple sites, Follow Up       Assessment and Plan     Cancer Staging   No matching staging information was found for the patient.    Follicular Lymphoma.   Initially diagnosed in 2010. 4th relapse was May 2021 treated with CAR-T therapy last done in January 2022. Currently on surveillance.  Denies any B symptoms.  CT chest abdomen pelvis 10/13/2024 showed no recurrent lymphadenopathy.  Recent CBC 10/13/2024 showed normal white count with mildly low hemoglobin of 11.7.  Platelet count is stable at 100. Pending for today. No signs or symptoms suggestive of recurrence. He will see Dr. Crooks in 6 months with labs and CT prior to continue to monitor.     Recurrent prostate cancer  PET January 2024 showed evidence of recurrence. Started enzalutamide and relugolix in February 2024.  He is on Megace and mirabegron. He is following with urology. Last seen January 16th 2025 with undetectable PSA. He will see them again in 6 months.     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 Continue to check monthly at the clinic closest to his home. He gets IVIG with [facility].  Last given in April. Pending for today.       Prophylaxis:   He is still on acyclovir which we will continue for now.  CD4 counts continue to be low so he will remain on Bactrim. T cell subset profile pending for today.     Streptococcus cornsellatus  Has had an infection with strep for 2 months. Following with his PCP. Has tried multiple antibiotics. Currently on cefdinir. Seems to be slowly improving. Mild erythema of tonsils on exam.     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  Follicular lymphoma: Diagnosed in April, 2010 . Stage IV diagnosis with bone marrow involvement.  Initial disease involved the cervical, supraclavicular, axillary, mesenteric mass, retroperitoneal nodes, and possibly the manubrium.  Relapse noted on imaging in December 2016.  Axillary node biopsy from February 2017 shows low-grade follicle center cell lymphoma.  Second relapse on imaging January 2019.  Repeat biopsy January 7, 2019 of a right medial thigh lymph node shows recurrent follicular lymphoma.  Grade 2.  Third relapse on imaging in May, 2020. Pathologic confirmation May 28, 2020 from a right axillary lymph node excisional biopsy.  Fourth relapse May 2021     2.  Prostate cancer:  Pathologic stage T3b prostate cancer.  Positive margins, multiple, and surgery.  3 lymph nodes were negative.  High risk for recurrent disease.  He is being followed at [facility].  Maricel 4+3 = 7.  Tertiary Maricel pattern 5 and preoperative PSA of 27.      Radiographic recurrence seen on PET  scan from January 2024.  This showed uptake located between the rectum and lower postsurgical changes with an SUV of 10.6, measuring 22 x 15 mm.  Second area of uptake in the right retroperitoneum with a maximum SUV of 15.  Measures 13 x 16 cm.  He also had PSA worsening with a value of 5.46 in December 2023.  In December 2022 was 1.58.     3.  Hypogammaglobulinemia: He has received intermittent doses of IVIG.     Treatment:     Lymphoma:    He had 6 cycles of bendamustine and Rituxan completed in October, 2010.  He had 2 years of maintenance Rituxan therapy finishing in September, 2012.     Second course of bendamustine plus rituximab started February 20, 2017.  Cycle 6 was completed on July 11, 2017.  Then had maintenance rituximab through January, 2019.     O-CHOP started at second relapse.  Cycle 1 given April 23, 2019.  Cycle 6 given August 16, 2019.  [facility]     Iidelalisib started October 21, 2020 at  [facility].  150 mg twice daily.       (6/28/21)  polatuzumab (12/21/21)      Axicabtagene ciloleucel (Yescarta) given 1/17/22.        Prostate:   Initial radical prostatectomy and bilateral lymph node dissection.  November 15, 2017.  Prostatic adenocarcinoma Maricel 4+3 = 7 with tertiary pattern 5.  Tumor involves 45% of total surface area.  Positive for extensive extraprostatic extension.  Positive for bilateral seminal vesicle invasion.  Multiple margins positive.  Lymphovascular invasion not identified.  Perineural invasion was noted. Lymph nodes negative. 3 were examined (2 right obturator and 1 left obturator).     Completed radiation to the prostate fossa and pelvic lymph nodes at [facility] early May 2018.  He received 4500 cGy in 25 fractions.  He then had 2340 cGy boost in 13 fractions to the prostatic fossa, for a total dose of 6240 cGy in 38 treatment fractions delivered over 54 days.  He did not receive hormonal therapy.     Relugolix and  "enzalutamide started for recurrent/metastatic disease in February 2024.    History of Present Illness    Juvenal is here for 6-month follow-up    He is okay today. Still getting over a strep infection for the past two months. He recently started a new antibiotic with his PCP. Symptoms are improving. He started with sore throat with productive cough, fatigue, and sinus congestion. No fevers. No SOB. No increasing fatigue. No drenching night sweats. Stage appetite and weight. Continues on Bactrim and acyclovir. Continues to smoke cannibals daily. Feels right inguinal discomfort when sitting since the surgery. Not progressive. No lumps there. Last got IVIG in April.     Review of Systems    Pertinent items are noted in HPI.    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  Smells of smoke.  HEENT: Head: Normal, normocephalic, atraumatic. Mild left tonsillar erythema. Right not visualized.   Chest: Normal chest wall and respirations.   Abdomen: Soft, nondistended  Extremities: No lower extremity edema  Skin: No rashes or lesions  CNS: Grossly nonfocal  Lymphatics: No cervical, supraclavicular, inguinal, or axillary " lymphadenopathy.       Lab Results    No results found for this or any previous visit (from the past week).    Imaging    No results found.    The longitudinal plan of care for the diagnosis(es)/condition(s) as documented were addressed during this visit. Due to the added complexity in care, I will continue to support Juvenal in the subsequent management and with ongoing continuity of care.    Total time 33 minutes, to include face to face visit, review of EMR, ordering, documentation and coordination of care on date of service.    Signed by: Katuiska Bernal PA-C

## 2025-05-27 ENCOUNTER — ONCOLOGY VISIT (OUTPATIENT)
Dept: ONCOLOGY | Facility: HOSPITAL | Age: 60
End: 2025-05-27
Payer: COMMERCIAL

## 2025-05-27 ENCOUNTER — INFUSION THERAPY VISIT (OUTPATIENT)
Dept: INFUSION THERAPY | Facility: HOSPITAL | Age: 60
End: 2025-05-27
Payer: COMMERCIAL

## 2025-05-27 VITALS
OXYGEN SATURATION: 100 % | HEART RATE: 60 BPM | BODY MASS INDEX: 36.03 KG/M2 | SYSTOLIC BLOOD PRESSURE: 146 MMHG | WEIGHT: 229.6 LBS | RESPIRATION RATE: 19 BRPM | HEIGHT: 67 IN | TEMPERATURE: 98.4 F | DIASTOLIC BLOOD PRESSURE: 70 MMHG

## 2025-05-27 DIAGNOSIS — C82.08 FOLLICULAR LYMPHOMA GRADE I, LYMPH NODES OF MULTIPLE SITES (H): ICD-10-CM

## 2025-05-27 DIAGNOSIS — C83.398 DIFFUSE LARGE B-CELL LYMPHOMA OF EXTRANODAL SITE EXCLUDING SPLEEN AND OTHER SOLID ORGANS (H): Primary | ICD-10-CM

## 2025-05-27 DIAGNOSIS — D80.1 HYPOGAMMAGLOBULINEMIA: ICD-10-CM

## 2025-05-27 DIAGNOSIS — D72.818 LOW CD4 CELL COUNT DETERMINED BY FLOW CYTOMETRY: ICD-10-CM

## 2025-05-27 DIAGNOSIS — C82.18 GRADE 2 FOLLICULAR LYMPHOMA OF LYMPH NODES OF MULTIPLE REGIONS (H): Primary | ICD-10-CM

## 2025-05-27 PROCEDURE — 86360 T CELL ABSOLUTE COUNT/RATIO: CPT

## 2025-05-27 PROCEDURE — 36591 DRAW BLOOD OFF VENOUS DEVICE: CPT

## 2025-05-27 PROCEDURE — 82784 ASSAY IGA/IGD/IGG/IGM EACH: CPT

## 2025-05-27 PROCEDURE — G0463 HOSPITAL OUTPT CLINIC VISIT: HCPCS

## 2025-05-27 PROCEDURE — 250N000011 HC RX IP 250 OP 636

## 2025-05-27 RX ORDER — DOXYCYCLINE 100 MG/1
CAPSULE ORAL
COMMUNITY

## 2025-05-27 RX ORDER — DENOSUMAB 60 MG/ML
INJECTION SUBCUTANEOUS
COMMUNITY
Start: 2025-01-28

## 2025-05-27 RX ORDER — HEPARIN SODIUM (PORCINE) LOCK FLUSH IV SOLN 100 UNIT/ML 100 UNIT/ML
SOLUTION INTRAVENOUS
Status: COMPLETED
Start: 2025-05-27 | End: 2025-05-27

## 2025-05-27 RX ORDER — HEPARIN SODIUM (PORCINE) LOCK FLUSH IV SOLN 100 UNIT/ML 100 UNIT/ML
5 SOLUTION INTRAVENOUS
OUTPATIENT
Start: 2025-05-27

## 2025-05-27 RX ORDER — ACYCLOVIR 800 MG/1
400 TABLET ORAL EVERY 12 HOURS
Qty: 180 TABLET | Refills: 1 | Status: SHIPPED | OUTPATIENT
Start: 2025-05-27

## 2025-05-27 RX ORDER — PREDNISONE 10 MG/1
TABLET ORAL
COMMUNITY

## 2025-05-27 RX ORDER — MULTIVITAMIN WITH FOLIC ACID 400 MCG
TABLET ORAL
COMMUNITY

## 2025-05-27 RX ORDER — PREDNISONE 20 MG/1
40 TABLET ORAL DAILY
COMMUNITY

## 2025-05-27 RX ORDER — CODEINE PHOSPHATE AND GUAIFENESIN 10; 100 MG/5ML; MG/5ML
SOLUTION ORAL
COMMUNITY
Start: 2025-04-05

## 2025-05-27 RX ORDER — HEPARIN SODIUM (PORCINE) LOCK FLUSH IV SOLN 100 UNIT/ML 100 UNIT/ML
5 SOLUTION INTRAVENOUS
Status: DISCONTINUED | OUTPATIENT
Start: 2025-05-27 | End: 2025-05-27 | Stop reason: HOSPADM

## 2025-05-27 RX ORDER — AZITHROMYCIN 250 MG/1
TABLET, FILM COATED ORAL
COMMUNITY
Start: 2025-03-19

## 2025-05-27 RX ORDER — NIRMATRELVIR AND RITONAVIR 300-100 MG
KIT ORAL
COMMUNITY
Start: 2025-03-12

## 2025-05-27 RX ORDER — AMOXICILLIN 875 MG/1
875 TABLET, COATED ORAL 2 TIMES DAILY
COMMUNITY

## 2025-05-27 RX ORDER — CEFDINIR 300 MG/1
1 CAPSULE ORAL
COMMUNITY
Start: 2025-05-23 | End: 2025-06-02

## 2025-05-27 RX ORDER — AZITHROMYCIN 500 MG/1
500 TABLET, FILM COATED ORAL DAILY
COMMUNITY

## 2025-05-27 RX ORDER — PREDNISONE 10 MG/1
TABLET ORAL
COMMUNITY
Start: 2025-04-05

## 2025-05-27 RX ORDER — BUDESONIDE AND FORMOTEROL FUMARATE DIHYDRATE 80; 4.5 UG/1; UG/1
AEROSOL RESPIRATORY (INHALATION)
COMMUNITY
Start: 2025-04-16

## 2025-05-27 RX ADMIN — Medication 500 UNITS: at 12:30

## 2025-05-27 ASSESSMENT — PAIN SCALES - GENERAL: PAINLEVEL_OUTOF10: NO PAIN (0)

## 2025-05-27 NOTE — PROGRESS NOTES
"Oncology Rooming Note    May 27, 2025 1:04 PM   Juvenal Blake is a 59 year old male who presents for:    Chief Complaint   Patient presents with    Oncology Clinic Visit     Follicular lymphoma grade i, lymph nodes of multiple sites, Follow Up     Initial Vitals: BP (!) 146/70 (BP Location: Right arm, Patient Position: Sitting, Cuff Size: Adult Large)   Pulse 60   Temp 98.4  F (36.9  C) (Oral)   Resp 19   Ht 1.702 m (5' 7\")   Wt 104.1 kg (229 lb 9.6 oz)   SpO2 100%   BMI 35.96 kg/m   Estimated body mass index is 35.96 kg/m  as calculated from the following:    Height as of this encounter: 1.702 m (5' 7\").    Weight as of this encounter: 104.1 kg (229 lb 9.6 oz). Body surface area is 2.22 meters squared.  No Pain (0) Comment: Data Unavailable   No LMP for male patient.  Allergies reviewed: Yes  Medications reviewed: Yes    Medications: Medication refills not needed today.  Pharmacy name entered into [employer]: [id number] - [place], [place] - [address] AT [facility] [address]    Frailty Screening:   Is the patient here for a new oncology consult visit in cancer care? 2. No    PHQ9:  Did this patient require a PHQ9?: No      Clinical concerns: Patient was experiencing pain the groin area near the surgery site. Patient would like to know if he should have a scan done soon. Katiuska was notified.      Garfield Barroso MA              "

## 2025-05-27 NOTE — LETTER
5/27/2025      Juvenal Blake  [address] [place] [address]      Dear Colleague,    Thank you for referring your patient, Juvenal Blake, to the [facility] [place]. Please see a copy of my visit note below.    [facility] Progress Note    Patient: Juvenal Blake  MRN: [id number]  Date of Service: May 27, 2025      Oncologist: Dr. Crooks    Reason for Visit    Chief Complaint   Patient presents with     Oncology Clinic Visit     Follicular lymphoma grade i, lymph nodes of multiple sites, Follow Up       Assessment and Plan     Cancer Staging   No matching staging information was found for the patient.    Follicular Lymphoma.   Initially diagnosed in 2010. 4th relapse was May 2021 treated with CAR-T therapy last done in January 2022. Currently on surveillance.  Denies any B symptoms.  CT chest abdomen pelvis 10/13/2024 showed no recurrent lymphadenopathy.  Recent CBC 10/13/2024 showed normal white count with mildly low hemoglobin of 11.7.  Platelet count is stable at 100. Pending for today. No signs or symptoms suggestive of recurrence. He will see Dr. Crooks in 6 months with labs and CT prior to continue to monitor.     Recurrent prostate cancer  PET January 2024 showed evidence of recurrence. Started enzalutamide and relugolix in February 2024.  He is on Megace and mirabegron. He is following with urology. Last seen January 16th 2025 with undetectable PSA. He will see them again in 6 months.     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 Continue to check monthly at the clinic closest to his home. He gets IVIG with [facility].  Last given in April. Pending for today.       Prophylaxis:   He is still on acyclovir which we will continue for now.  CD4 counts continue to be low so he will remain on Bactrim. T cell subset profile pending for today.      Streptococcus cornsellatus  Has had an infection with strep for 2 months. Following with his PCP. Has tried multiple antibiotics. Currently on cefdinir. Seems to be slowly improving. Mild erythema of tonsils on exam.     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  Follicular lymphoma: Diagnosed in April, 2010 . Stage IV diagnosis with bone marrow involvement.  Initial disease involved the cervical, supraclavicular, axillary, mesenteric mass, retroperitoneal nodes, and possibly the manubrium.  Relapse noted on imaging in December 2016.  Axillary node biopsy from February 2017 shows low-grade follicle center cell lymphoma.  Second relapse on imaging January 2019.  Repeat biopsy January 7, 2019 of a right medial thigh lymph node shows recurrent follicular lymphoma.  Grade 2.  Third relapse on imaging in May, 2020. Pathologic confirmation May 28, 2020 from a right axillary lymph node excisional biopsy.  Fourth relapse May 2021     2.  Prostate cancer:  Pathologic stage T3b prostate cancer.  Positive  margins, multiple, and surgery.  3 lymph nodes were negative.  High risk for recurrent disease.  He is being followed at [facility].  [place] 4+3 = 7.  Tertiary Maricel pattern 5 and preoperative PSA of 27.      Radiographic recurrence seen on PET scan from January 2024.  This showed uptake located between the rectum and lower postsurgical changes with an SUV of 10.6, measuring 22 x 15 mm.  Second area of uptake in the right retroperitoneum with a maximum SUV of 15.  Measures 13 x 16 cm.  He also had PSA worsening with a value of 5.46 in December 2023.  In December 2022 was 1.58.     3.  Hypogammaglobulinemia: He has received intermittent doses of IVIG.     Treatment:     Lymphoma:    He had 6 cycles of bendamustine and Rituxan completed in October, 2010.  He had 2 years of maintenance Rituxan therapy finishing in September, 2012.     Second course of bendamustine plus rituximab started February 20, 2017.  Cycle 6 was completed on July 11, 2017.  Then had maintenance rituximab through January, 2019.     O-CHOP started at second relapse.  Cycle 1 given April 23, 2019.  Cycle 6 given August 16, 2019.  Haplo NAM-NK therapy at [facility] September 1, 2020     Iidelalisib started October 21, 2020 at  Third rrelapse.  150 mg twice daily.       (6/28/21)  polatuzumab (12/21/21)      Axicabtagene ciloleucel (Yescarta) given 1/17/22.        Prostate:   Initial radical prostatectomy and bilateral lymph node dissection.  November 15, 2017.  Prostatic adenocarcinoma Maricel 4+3 = 7 with tertiary pattern 5.  Tumor involves 45% of total surface area.  Positive for extensive extraprostatic extension.  Positive for bilateral seminal vesicle invasion.  Multiple margins positive.  Lymphovascular invasion not identified.  Perineural invasion was noted. Lymph nodes negative. 3 were examined (2 right obturator and 1 left obturator).     Completed radiation to the prostate fossa and pelvic lymph nodes at [facility] early  "May 2018.  He received 4500 cGy in 25 fractions.  He then had 2340 cGy boost in 13 fractions to the prostatic fossa, for a total dose of 6240 cGy in 38 treatment fractions delivered over 54 days.  He did not receive hormonal therapy.     Relugolix and enzalutamide started for recurrent/metastatic disease in February 2024.    History of Present Illness    Juvenal is here for 6-month follow-up    He is okay today. Still getting over a strep infection for the past two months. He recently started a new antibiotic with his PCP. Symptoms are improving. He started with sore throat with productive cough, fatigue, and sinus congestion. No fevers. No SOB. No increasing fatigue. No drenching night sweats. Stage appetite and weight. Continues on Bactrim and acyclovir. Continues to smoke cannibals daily. Feels right inguinal discomfort when sitting since the surgery. Not progressive. No lumps there. Last got IVIG in April.     Review of Systems    Pertinent items are noted in HPI.    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  Smells of smoke.  HEENT: Head: Normal, normocephalic, atraumatic. Mild left tonsillar " "erythema. Right not visualized.   Chest: Normal chest wall and respirations.   Abdomen: Soft, nondistended  Extremities: No lower extremity edema  Skin: No rashes or lesions  CNS: Grossly nonfocal  Lymphatics: No cervical, supraclavicular, inguinal, or axillary lymphadenopathy.       Lab Results    No results found for this or any previous visit (from the past week).    Imaging    No results found.    The longitudinal plan of care for the diagnosis(es)/condition(s) as documented were addressed during this visit. Due to the added complexity in care, I will continue to support Juvenal in the subsequent management and with ongoing continuity of care.    Total time 33 minutes, to include face to face visit, review of EMR, ordering, documentation and coordination of care on date of service.    Signed by: Katiuska Bernal PA-C    Oncology Rooming Note    May 27, 2025 1:04 PM   Juvenal Blake is a 59 year old male who presents for:    Chief Complaint   Patient presents with     Oncology Clinic Visit     Follicular lymphoma grade i, lymph nodes of multiple sites, Follow Up     Initial Vitals: BP (!) 146/70 (BP Location: Right arm, Patient Position: Sitting, Cuff Size: Adult Large)   Pulse 60   Temp 98.4  F (36.9  C) (Oral)   Resp 19   Ht 1.702 m (5' 7\")   Wt 104.1 kg (229 lb 9.6 oz)   SpO2 100%   BMI 35.96 kg/m   Estimated body mass index is 35.96 kg/m  as calculated from the following:    Height as of this encounter: 1.702 m (5' 7\").    Weight as of this encounter: 104.1 kg (229 lb 9.6 oz). Body surface area is 2.22 meters squared.  No Pain (0) Comment: Data Unavailable   No LMP for male patient.  Allergies reviewed: Yes  Medications reviewed: Yes    Medications: Medication refills not needed today.  Pharmacy name entered into [employer]: [facility] #[id number] - [place], [place] - [address] AT [facility] [address]    Frailty Screening:   Is the patient here for a new oncology consult visit in " cancer care? 2. No    PHQ9:  Did this patient require a PHQ9?: No      Clinical concerns: Patient was experiencing pain the groin area near the surgery site. Patient would like to know if he should have a scan done soon. Katiuska was notified.      Garfield Barroso MA                Again, thank you for allowing me to participate in the care of your patient.        Sincerely,        Katiuska Bernal PA-C    Electronically signed

## 2025-05-28 LAB
CD3 CELLS # BLD: 439 CELLS/UL (ref 603–2990)
CD3 CELLS NFR BLD: 46 % (ref 49–84)
CD3+CD4+ CELLS # BLD: 180 CELLS/UL (ref 441–2156)
CD3+CD4+ CELLS NFR BLD: 19 % (ref 28–63)
CD3+CD4+ CELLS/CD3+CD8+ CLL BLD: 0.73 % (ref 1.4–2.6)
CD3+CD8+ CELLS # BLD: 245 CELLS/UL (ref 125–1312)
CD3+CD8+ CELLS NFR BLD: 26 % (ref 10–40)
IGG SERPL-MCNC: 451 MG/DL (ref 610–1616)
T CELL COMMENT: ABNORMAL

## 2025-06-04 ENCOUNTER — HOME INFUSION (OUTPATIENT)
Dept: HOME HEALTH SERVICES | Facility: HOME HEALTH | Age: 60
End: 2025-06-04
Payer: COMMERCIAL

## 2025-06-04 DIAGNOSIS — D80.1 HYPOGAMMAGLOBULINEMIA: ICD-10-CM

## 2025-06-04 RX ORDER — ACETAMINOPHEN 325 MG/1
650 TABLET ORAL
Qty: 6 TABLET | Refills: 3 | Status: ACTIVE | OUTPATIENT
Start: 2025-06-04 | End: 2026-04-18

## 2025-06-04 RX ORDER — DIPHENHYDRAMINE HCL 25 MG
50 CAPSULE ORAL
Qty: 999999 CAPSULE | Refills: 0 | Status: ACTIVE | OUTPATIENT
Start: 2025-06-04 | End: 2026-04-18

## 2025-06-04 RX ORDER — HUMAN IMMUNOGLOBULIN G 5 G/50ML
5 LIQUID INTRAVENOUS
Qty: 9999 ML | Refills: 0 | Status: ACTIVE | OUTPATIENT
Start: 2025-06-04 | End: 2026-04-18

## 2025-06-04 RX ORDER — HUMAN IMMUNOGLOBULIN G 20 G/200ML
20 LIQUID INTRAVENOUS
Qty: 99999 ML | Refills: 0 | Status: ACTIVE | OUTPATIENT
Start: 2025-06-04 | End: 2026-04-18

## 2025-06-04 RX ORDER — HUMAN IMMUNOGLOBULIN G 10 G/100ML
10 LIQUID INTRAVENOUS
Qty: 9999 ML | Refills: 0 | Status: ACTIVE | OUTPATIENT
Start: 2025-06-04 | End: 2026-04-18

## 2025-06-12 ENCOUNTER — HOME INFUSION BILLING (OUTPATIENT)
Dept: HOME HEALTH SERVICES | Facility: HOME HEALTH | Age: 60
End: 2025-06-12
Payer: COMMERCIAL

## 2025-06-12 PROCEDURE — A4215 STERILE NEEDLE: HCPCS

## 2025-06-12 PROCEDURE — E1399 DURABLE MEDICAL EQUIPMENT MI: HCPCS

## 2025-06-13 PROCEDURE — E0781 EXTERNAL AMBULATORY INFUS PU: HCPCS | Mod: RR

## 2025-06-16 ENCOUNTER — LAB REQUISITION (OUTPATIENT)
Dept: LAB | Facility: CLINIC | Age: 60
End: 2025-06-16
Payer: COMMERCIAL

## 2025-06-16 DIAGNOSIS — E78.1 PURE HYPERGLYCERIDEMIA: ICD-10-CM

## 2025-06-16 PROCEDURE — 80053 COMPREHEN METABOLIC PANEL: CPT | Mod: ORL | Performed by: FAMILY MEDICINE

## 2025-06-16 PROCEDURE — 80061 LIPID PANEL: CPT | Mod: ORL | Performed by: FAMILY MEDICINE

## 2025-06-17 LAB
ALBUMIN SERPL BCG-MCNC: 4.3 G/DL (ref 3.5–5.2)
ALP SERPL-CCNC: 55 U/L (ref 40–150)
ALT SERPL W P-5'-P-CCNC: 15 U/L (ref 0–70)
ANION GAP SERPL CALCULATED.3IONS-SCNC: 10 MMOL/L (ref 7–15)
AST SERPL W P-5'-P-CCNC: 21 U/L (ref 0–45)
BILIRUB SERPL-MCNC: 0.2 MG/DL
BUN SERPL-MCNC: 15 MG/DL (ref 8–23)
CALCIUM SERPL-MCNC: 9.9 MG/DL (ref 8.8–10.4)
CHLORIDE SERPL-SCNC: 107 MMOL/L (ref 98–107)
CHOLEST SERPL-MCNC: 187 MG/DL
CREAT SERPL-MCNC: 1.05 MG/DL (ref 0.67–1.17)
EGFRCR SERPLBLD CKD-EPI 2021: 81 ML/MIN/1.73M2
FASTING STATUS PATIENT QL REPORTED: NO
FASTING STATUS PATIENT QL REPORTED: NO
GLUCOSE SERPL-MCNC: 94 MG/DL (ref 70–99)
HCO3 SERPL-SCNC: 22 MMOL/L (ref 22–29)
HDLC SERPL-MCNC: 56 MG/DL
LDLC SERPL CALC-MCNC: 79 MG/DL
NONHDLC SERPL-MCNC: 131 MG/DL
POTASSIUM SERPL-SCNC: 4.5 MMOL/L (ref 3.4–5.3)
PROT SERPL-MCNC: 6.6 G/DL (ref 6.4–8.3)
SODIUM SERPL-SCNC: 139 MMOL/L (ref 135–145)
TRIGL SERPL-MCNC: 260 MG/DL

## 2025-07-07 ENCOUNTER — LAB (OUTPATIENT)
Dept: LAB | Facility: CLINIC | Age: 60
End: 2025-07-07
Payer: COMMERCIAL

## 2025-07-07 DIAGNOSIS — D80.1 HYPOGAMMAGLOBULINEMIA: ICD-10-CM

## 2025-07-07 LAB
BASOPHILS # BLD AUTO: 0 10E3/UL (ref 0–0.2)
BASOPHILS NFR BLD AUTO: 0 %
EOSINOPHIL # BLD AUTO: 0.1 10E3/UL (ref 0–0.7)
EOSINOPHIL NFR BLD AUTO: 2 %
ERYTHROCYTE [DISTWIDTH] IN BLOOD BY AUTOMATED COUNT: 13.1 % (ref 10–15)
HCT VFR BLD AUTO: 34.4 % (ref 40–53)
HGB BLD-MCNC: 12 G/DL (ref 13.3–17.7)
IMM GRANULOCYTES # BLD: 0 10E3/UL
IMM GRANULOCYTES NFR BLD: 0 %
LYMPHOCYTES # BLD AUTO: 0.8 10E3/UL (ref 0.8–5.3)
LYMPHOCYTES NFR BLD AUTO: 17 %
MCH RBC QN AUTO: 33.5 PG (ref 26.5–33)
MCHC RBC AUTO-ENTMCNC: 34.9 G/DL (ref 31.5–36.5)
MCV RBC AUTO: 96 FL (ref 78–100)
MONOCYTES # BLD AUTO: 0.4 10E3/UL (ref 0–1.3)
MONOCYTES NFR BLD AUTO: 8 %
NEUTROPHILS # BLD AUTO: 3.2 10E3/UL (ref 1.6–8.3)
NEUTROPHILS NFR BLD AUTO: 71 %
PLATELET # BLD AUTO: 101 10E3/UL (ref 150–450)
RBC # BLD AUTO: 3.58 10E6/UL (ref 4.4–5.9)
WBC # BLD AUTO: 4.5 10E3/UL (ref 4–11)

## 2025-07-07 PROCEDURE — 36415 COLL VENOUS BLD VENIPUNCTURE: CPT

## 2025-07-07 PROCEDURE — 82784 ASSAY IGA/IGD/IGG/IGM EACH: CPT

## 2025-07-08 LAB — IGG SERPL-MCNC: 510 MG/DL (ref 610–1616)

## 2025-07-29 DIAGNOSIS — Z85.72 HISTORY OF LYMPHOMA: ICD-10-CM

## 2025-07-29 DIAGNOSIS — C83.398 DIFFUSE LARGE B-CELL LYMPHOMA OF EXTRANODAL SITE EXCLUDING SPLEEN AND OTHER SOLID ORGANS (H): Primary | ICD-10-CM

## 2025-07-29 DIAGNOSIS — D80.6 IMMUNODEFICIENCY DISORDER DUE TO ANTIBODY DEFICIENCY: ICD-10-CM

## 2025-07-29 DIAGNOSIS — D80.1 HYPOGAMMAGLOBULINEMIA: ICD-10-CM

## 2025-07-29 RX ORDER — ALBUTEROL SULFATE 90 UG/1
1-2 INHALANT RESPIRATORY (INHALATION)
Start: 2025-08-12

## 2025-07-29 RX ORDER — ALBUTEROL SULFATE 0.83 MG/ML
2.5 SOLUTION RESPIRATORY (INHALATION)
OUTPATIENT
Start: 2025-08-12

## 2025-07-29 RX ORDER — DIPHENHYDRAMINE HYDROCHLORIDE 50 MG/ML
25 INJECTION, SOLUTION INTRAMUSCULAR; INTRAVENOUS
Start: 2025-08-12

## 2025-07-29 RX ORDER — MEPERIDINE HYDROCHLORIDE 25 MG/ML
25 INJECTION INTRAMUSCULAR; INTRAVENOUS; SUBCUTANEOUS
OUTPATIENT
Start: 2025-08-12

## 2025-07-29 RX ORDER — HEPARIN SODIUM,PORCINE 10 UNIT/ML
5-20 VIAL (ML) INTRAVENOUS DAILY PRN
OUTPATIENT
Start: 2025-08-12

## 2025-07-29 RX ORDER — EPINEPHRINE 1 MG/ML
0.3 INJECTION, SOLUTION INTRAMUSCULAR; SUBCUTANEOUS EVERY 5 MIN PRN
OUTPATIENT
Start: 2025-08-12

## 2025-07-29 RX ORDER — METHYLPREDNISOLONE SODIUM SUCCINATE 40 MG/ML
40 INJECTION INTRAMUSCULAR; INTRAVENOUS
Start: 2025-08-12

## 2025-07-29 RX ORDER — DIPHENHYDRAMINE HYDROCHLORIDE 50 MG/ML
50 INJECTION, SOLUTION INTRAMUSCULAR; INTRAVENOUS
Start: 2025-08-12

## 2025-07-29 RX ORDER — HEPARIN SODIUM (PORCINE) LOCK FLUSH IV SOLN 100 UNIT/ML 100 UNIT/ML
5 SOLUTION INTRAVENOUS
OUTPATIENT
Start: 2025-08-12

## 2025-07-30 ENCOUNTER — EPISODE UPDATE (OUTPATIENT)
Dept: HOME HEALTH SERVICES | Facility: HOME HEALTH | Age: 60
End: 2025-07-30
Payer: COMMERCIAL

## 2025-08-05 DIAGNOSIS — D80.1 HYPOGAMMAGLOBULINEMIA: Primary | ICD-10-CM

## 2025-08-05 DIAGNOSIS — D80.6 IMMUNODEFICIENCY DISORDER DUE TO ANTIBODY DEFICIENCY: ICD-10-CM

## 2025-08-05 RX ORDER — DIPHENHYDRAMINE HCL 50 MG
50 CAPSULE ORAL ONCE
Start: 2025-08-12 | End: 2025-08-12

## 2025-08-05 RX ORDER — ACETAMINOPHEN 325 MG/1
325 TABLET ORAL ONCE
Start: 2025-08-12 | End: 2025-08-12

## 2025-08-06 DIAGNOSIS — D80.1 HYPOGAMMAGLOBULINEMIA: ICD-10-CM

## 2025-08-06 RX ORDER — ACETAMINOPHEN 325 MG/1
325 TABLET ORAL
Qty: 12 TABLET | Refills: 0 | Status: ACTIVE | OUTPATIENT
Start: 2025-08-06 | End: 2026-07-29

## 2025-08-06 RX ORDER — DIPHENHYDRAMINE HCL 25 MG
50 CAPSULE ORAL
Qty: 24 CAPSULE | Refills: 0 | Status: ACTIVE | OUTPATIENT
Start: 2025-08-06 | End: 2026-07-29

## 2025-08-06 RX ORDER — HEPARIN SODIUM (PORCINE) LOCK FLUSH IV SOLN 100 UNIT/ML 100 UNIT/ML
5 SOLUTION INTRAVENOUS
Qty: 60 ML | Refills: 0 | Status: ACTIVE | OUTPATIENT
Start: 2025-08-06 | End: 2026-07-29

## 2025-08-06 RX ORDER — DIPHENHYDRAMINE HYDROCHLORIDE 50 MG/ML
50 INJECTION, SOLUTION INTRAMUSCULAR; INTRAVENOUS PRN
Qty: 99999 ML | Refills: 0 | Status: ACTIVE | OUTPATIENT
Start: 2025-08-06 | End: 2026-07-29

## 2025-08-06 RX ORDER — EPINEPHRINE 0.3 MG/.3ML
0.3 INJECTION SUBCUTANEOUS PRN
Qty: 999999 ML | Refills: 0 | Status: ACTIVE | OUTPATIENT
Start: 2025-08-06 | End: 2026-07-29

## 2025-08-06 RX ORDER — SODIUM CHLORIDE 9 MG/ML
500 INJECTION, SOLUTION INTRAVENOUS PRN
Qty: 999999 ML | Refills: 0 | Status: ACTIVE | OUTPATIENT
Start: 2025-08-06 | End: 2026-07-29

## 2025-08-12 ENCOUNTER — DOCUMENTATION ONLY (OUTPATIENT)
Dept: HOME HEALTH SERVICES | Facility: HOME HEALTH | Age: 60
End: 2025-08-12
Payer: COMMERCIAL

## 2025-08-12 ENCOUNTER — MYC MEDICAL ADVICE (OUTPATIENT)
Dept: ONCOLOGY | Facility: HOSPITAL | Age: 60
End: 2025-08-12
Payer: COMMERCIAL

## 2025-08-14 ENCOUNTER — LAB (OUTPATIENT)
Dept: LAB | Facility: CLINIC | Age: 60
End: 2025-08-14
Payer: COMMERCIAL

## 2025-08-14 DIAGNOSIS — D80.1 HYPOGAMMAGLOBULINEMIA: ICD-10-CM

## 2025-08-21 ENCOUNTER — DOCUMENTATION ONLY (OUTPATIENT)
Dept: PHARMACY | Facility: CLINIC | Age: 60
End: 2025-08-21
Payer: COMMERCIAL

## 2025-08-27 ENCOUNTER — PATIENT OUTREACH (OUTPATIENT)
Dept: ONCOLOGY | Facility: HOSPITAL | Age: 60
End: 2025-08-27
Payer: COMMERCIAL

## 2025-09-03 ENCOUNTER — LAB (OUTPATIENT)
Dept: LAB | Facility: CLINIC | Age: 60
End: 2025-09-03
Payer: COMMERCIAL

## 2025-09-03 DIAGNOSIS — D80.1 HYPOGAMMAGLOBULINEMIA: ICD-10-CM

## 2025-09-03 DIAGNOSIS — D80.1 HYPOGAMMAGLOBULINEMIA: Primary | ICD-10-CM

## 2025-09-03 PROCEDURE — 36415 COLL VENOUS BLD VENIPUNCTURE: CPT

## 2025-09-03 PROCEDURE — 82784 ASSAY IGA/IGD/IGG/IGM EACH: CPT

## 2025-09-04 LAB — IGG SERPL-MCNC: 514 MG/DL (ref 610–1616)

## (undated) DEVICE — Device

## (undated) DEVICE — SOL WATER IRRIG 3000ML BAG 2B7117

## (undated) DEVICE — GLOVE BIOGEL PI ULTRATOUCH G SZ 7.5 42175

## (undated) DEVICE — BAG URINARY DRAIN 2000ML LF 154002

## (undated) DEVICE — DRSG TEGADERM 4X4 3/4" 1626W

## (undated) DEVICE — GOWN XLG DISP 9545

## (undated) DEVICE — DRSG TELFA 3X4" 1050

## (undated) DEVICE — SOL NACL 0.9% IRRIG 1000ML BOTTLE 2F7124

## (undated) DEVICE — SU DERMABOND ADVANCED .7ML DNX12

## (undated) DEVICE — TUBING SUCTION MEDI-VAC 1/4"X20' N620A

## (undated) DEVICE — CUSTOM PACK CYSTO PREFERRED SOT5BCYHEA

## (undated) DEVICE — TUBING SET THERMEDX UROLOGY SGL USE LL0006

## (undated) DEVICE — SPONGE RAY-TEC 4X8" 7318

## (undated) DEVICE — MAT FLOOR SURGICAL 40X38 0702140238

## (undated) DEVICE — GLOVE PROTEXIS POWDER FREE SMT 8.0  2D72PT80X

## (undated) DEVICE — LINEN TOWEL PACK X5 5464

## (undated) DEVICE — DRSG GAUZE 4X4" 3033

## (undated) DEVICE — JUMPSUIT CYSTO DISP SD-100

## (undated) DEVICE — DRSG TEGADERM IV ADVANCED 3.5X4.5" 1685

## (undated) DEVICE — SUTURE VICRYL+ 2-0 27IN SH UND VCP417H

## (undated) DEVICE — DRAPE OR LAPAROTOMY KC 89228*

## (undated) DEVICE — SU VICRYL+ 3-0 27IN SH UND VCP416H

## (undated) DEVICE — GLOVE SURG PI ULTRA TOUCH M SZ 8 LF

## (undated) DEVICE — PREP CHLORAPREP 26ML TINTED HI-LITE ORANGE 930815

## (undated) DEVICE — SUCTION MANIFOLD NEPTUNE 2 SYS 1 PORT 702-025-000

## (undated) DEVICE — STRAP CATH LEG ADJUSTABLE 0814-8200

## (undated) DEVICE — KIT INTRODUCER FLUENT MICRO 5FRX10CM ECHO TIP KIT-038-04

## (undated) DEVICE — SOL WATER IRRIG 1000ML BOTTLE 2F7114

## (undated) DEVICE — SU MONOCRYL+ 4-0 18IN PS2 UND MCP496G

## (undated) DEVICE — DECANTER BAG 2002S

## (undated) DEVICE — ESU GROUND PAD ADULT REM W/15' CORD E7507DB

## (undated) DEVICE — GLOVE BIOGEL PI INDICATOR 8.0 LF 41680

## (undated) DEVICE — CAP LUER LOCK MALE/FEMALE DUAL 2C6250

## (undated) DEVICE — LINEN GOWN XLG 5407

## (undated) DEVICE — CATH FOLEY 18FR 5ML LUBRICATH LATEX 0165L18

## (undated) DEVICE — COVER EASY EQUIP BAG W/BAND LATEX FREE EZ-28

## (undated) DEVICE — GLOVE BIOGEL PI ULTRATOUCH G SZ 8.0 42180

## (undated) DEVICE — COVER ULTRASOUND PROBE W/GEL FLEXI-FEEL 6"X58" LF  25-FF658

## (undated) DEVICE — PREP DYNA-HEX 4% CHG SCRUB 4OZ BOTTLE MDS098710

## (undated) DEVICE — CUSTOM PACK MINOR SBA5BMNHEA

## (undated) DEVICE — ESU PENCIL SMOKE EVAC W/ROCKER SWITCH 0703-047-000

## (undated) DEVICE — CONTAINER URINE SPEC 4OZ STRL 1053

## (undated) DEVICE — DRSG BIOPATCH GERMICIDAL SPLIT SPONGE 4MM MED 4150

## (undated) DEVICE — CAST STOCKINETTE 3"

## (undated) DEVICE — SU PDS II 0 CT-2 27" Z334H

## (undated) RX ORDER — METOPROLOL TARTRATE 1 MG/ML
INJECTION, SOLUTION INTRAVENOUS
Status: DISPENSED
Start: 2021-08-13

## (undated) RX ORDER — IVABRADINE 5 MG/1
TABLET, FILM COATED ORAL
Status: DISPENSED
Start: 2021-08-13

## (undated) RX ORDER — PENTAMIDINE ISETHIONATE 300 MG/300MG
INHALANT RESPIRATORY (INHALATION)
Status: DISPENSED
Start: 2023-08-30

## (undated) RX ORDER — LIDOCAINE HYDROCHLORIDE 10 MG/ML
INJECTION, SOLUTION EPIDURAL; INFILTRATION; INTRACAUDAL; PERINEURAL
Status: DISPENSED
Start: 2022-01-17

## (undated) RX ORDER — PENTAMIDINE ISETHIONATE 300 MG/300MG
INHALANT RESPIRATORY (INHALATION)
Status: DISPENSED
Start: 2023-10-25

## (undated) RX ORDER — LIDOCAINE HYDROCHLORIDE 10 MG/ML
INJECTION, SOLUTION EPIDURAL; INFILTRATION; INTRACAUDAL; PERINEURAL
Status: DISPENSED
Start: 2020-10-01

## (undated) RX ORDER — HEPARIN SODIUM (PORCINE) LOCK FLUSH IV SOLN 100 UNIT/ML 100 UNIT/ML
SOLUTION INTRAVENOUS
Status: DISPENSED
Start: 2023-01-18

## (undated) RX ORDER — PENTAMIDINE ISETHIONATE 300 MG/300MG
INHALANT RESPIRATORY (INHALATION)
Status: DISPENSED
Start: 2023-03-01

## (undated) RX ORDER — CALCIUM GLUCONATE 94 MG/ML
INJECTION, SOLUTION INTRAVENOUS
Status: DISPENSED
Start: 2021-12-13

## (undated) RX ORDER — PENTAMIDINE ISETHIONATE 300 MG/300MG
INHALANT RESPIRATORY (INHALATION)
Status: DISPENSED
Start: 2022-05-18

## (undated) RX ORDER — PHENYLEPHRINE HYDROCHLORIDE 10 MG/ML
INJECTION INTRAVENOUS
Status: DISPENSED
Start: 2024-08-15

## (undated) RX ORDER — BUPIVACAINE HYDROCHLORIDE AND EPINEPHRINE 2.5; 5 MG/ML; UG/ML
INJECTION, SOLUTION INFILTRATION; PERINEURAL
Status: DISPENSED
Start: 2024-10-15

## (undated) RX ORDER — NITROGLYCERIN 0.4 MG/1
TABLET SUBLINGUAL
Status: DISPENSED
Start: 2021-08-13

## (undated) RX ORDER — FENTANYL CITRATE 50 UG/ML
INJECTION, SOLUTION INTRAMUSCULAR; INTRAVENOUS
Status: DISPENSED
Start: 2024-10-15

## (undated) RX ORDER — PENTAMIDINE ISETHIONATE 300 MG/300MG
INHALANT RESPIRATORY (INHALATION)
Status: DISPENSED
Start: 2023-09-27

## (undated) RX ORDER — ALBUTEROL SULFATE 0.83 MG/ML
SOLUTION RESPIRATORY (INHALATION)
Status: DISPENSED
Start: 2021-06-04

## (undated) RX ORDER — FENTANYL CITRATE 50 UG/ML
INJECTION, SOLUTION INTRAMUSCULAR; INTRAVENOUS
Status: DISPENSED
Start: 2024-08-15

## (undated) RX ORDER — ONDANSETRON 2 MG/ML
INJECTION INTRAMUSCULAR; INTRAVENOUS
Status: DISPENSED
Start: 2024-08-15

## (undated) RX ORDER — LIDOCAINE HYDROCHLORIDE 10 MG/ML
INJECTION, SOLUTION INFILTRATION; PERINEURAL
Status: DISPENSED
Start: 2024-10-15

## (undated) RX ORDER — SODIUM CHLORIDE 9 MG/ML
INJECTION, SOLUTION INTRAVENOUS
Status: DISPENSED
Start: 2021-12-20

## (undated) RX ORDER — PENTAMIDINE ISETHIONATE 300 MG/300MG
INHALANT RESPIRATORY (INHALATION)
Status: DISPENSED
Start: 2022-03-17

## (undated) RX ORDER — ALBUTEROL SULFATE 0.83 MG/ML
SOLUTION RESPIRATORY (INHALATION)
Status: DISPENSED
Start: 2023-02-01

## (undated) RX ORDER — PENTAMIDINE ISETHIONATE 300 MG/300MG
INHALANT RESPIRATORY (INHALATION)
Status: DISPENSED
Start: 2022-04-18

## (undated) RX ORDER — HEPARIN SODIUM (PORCINE) LOCK FLUSH IV SOLN 100 UNIT/ML 100 UNIT/ML
SOLUTION INTRAVENOUS
Status: DISPENSED
Start: 2021-08-24

## (undated) RX ORDER — ALBUTEROL SULFATE 0.83 MG/ML
SOLUTION RESPIRATORY (INHALATION)
Status: DISPENSED
Start: 2022-05-18

## (undated) RX ORDER — HEPARIN SODIUM,PORCINE 10 UNIT/ML
VIAL (ML) INTRAVENOUS
Status: DISPENSED
Start: 2021-08-24

## (undated) RX ORDER — PENTAMIDINE ISETHIONATE 300 MG/300MG
INHALANT RESPIRATORY (INHALATION)
Status: DISPENSED
Start: 2023-03-29

## (undated) RX ORDER — PENTAMIDINE ISETHIONATE 300 MG/300MG
INHALANT RESPIRATORY (INHALATION)
Status: DISPENSED
Start: 2022-06-15

## (undated) RX ORDER — PENTAMIDINE ISETHIONATE 300 MG/300MG
INHALANT RESPIRATORY (INHALATION)
Status: DISPENSED
Start: 2022-01-13

## (undated) RX ORDER — ALBUTEROL SULFATE 0.83 MG/ML
SOLUTION RESPIRATORY (INHALATION)
Status: DISPENSED
Start: 2022-01-13

## (undated) RX ORDER — ALBUTEROL SULFATE 0.83 MG/ML
SOLUTION RESPIRATORY (INHALATION)
Status: DISPENSED
Start: 2022-03-17

## (undated) RX ORDER — ALBUTEROL SULFATE 0.83 MG/ML
SOLUTION RESPIRATORY (INHALATION)
Status: DISPENSED
Start: 2022-04-18

## (undated) RX ORDER — ALBUTEROL SULFATE 0.83 MG/ML
SOLUTION RESPIRATORY (INHALATION)
Status: DISPENSED
Start: 2022-06-15

## (undated) RX ORDER — PENTAMIDINE ISETHIONATE 300 MG/300MG
INHALANT RESPIRATORY (INHALATION)
Status: DISPENSED
Start: 2023-06-27

## (undated) RX ORDER — PENTAMIDINE ISETHIONATE 300 MG/300MG
INHALANT RESPIRATORY (INHALATION)
Status: DISPENSED
Start: 2023-05-02

## (undated) RX ORDER — LIDOCAINE HYDROCHLORIDE 10 MG/ML
INJECTION, SOLUTION EPIDURAL; INFILTRATION; INTRACAUDAL; PERINEURAL
Status: DISPENSED
Start: 2021-12-20

## (undated) RX ORDER — PENTAMIDINE ISETHIONATE 300 MG/300MG
INHALANT RESPIRATORY (INHALATION)
Status: DISPENSED
Start: 2023-05-30

## (undated) RX ORDER — PENTAMIDINE ISETHIONATE 300 MG/300MG
INHALANT RESPIRATORY (INHALATION)
Status: DISPENSED
Start: 2023-02-01

## (undated) RX ORDER — HEPARIN SODIUM (PORCINE) LOCK FLUSH IV SOLN 100 UNIT/ML 100 UNIT/ML
SOLUTION INTRAVENOUS
Status: DISPENSED
Start: 2021-12-20

## (undated) RX ORDER — LIDOCAINE HYDROCHLORIDE 10 MG/ML
INJECTION, SOLUTION EPIDURAL; INFILTRATION; INTRACAUDAL; PERINEURAL
Status: DISPENSED
Start: 2024-08-15

## (undated) RX ORDER — REGADENOSON 0.08 MG/ML
INJECTION, SOLUTION INTRAVENOUS
Status: DISPENSED
Start: 2020-08-07

## (undated) RX ORDER — FENTANYL CITRATE 50 UG/ML
INJECTION, SOLUTION INTRAMUSCULAR; INTRAVENOUS
Status: DISPENSED
Start: 2021-12-20

## (undated) RX ORDER — METOPROLOL TARTRATE 50 MG
TABLET ORAL
Status: DISPENSED
Start: 2021-08-13